# Patient Record
Sex: FEMALE | Race: BLACK OR AFRICAN AMERICAN | NOT HISPANIC OR LATINO | Employment: OTHER | URBAN - METROPOLITAN AREA
[De-identification: names, ages, dates, MRNs, and addresses within clinical notes are randomized per-mention and may not be internally consistent; named-entity substitution may affect disease eponyms.]

---

## 2018-08-14 ENCOUNTER — HOSPITAL ENCOUNTER (EMERGENCY)
Facility: HOSPITAL | Age: 63
Discharge: HOME | End: 2018-08-14
Attending: EMERGENCY MEDICINE
Payer: MEDICARE

## 2018-08-14 VITALS
RESPIRATION RATE: 16 BRPM | OXYGEN SATURATION: 96 % | HEIGHT: 61 IN | WEIGHT: 180 LBS | DIASTOLIC BLOOD PRESSURE: 78 MMHG | SYSTOLIC BLOOD PRESSURE: 164 MMHG | HEART RATE: 91 BPM | TEMPERATURE: 97 F | BODY MASS INDEX: 33.99 KG/M2
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
## 2018-08-14 PROCEDURE — 99283 EMERGENCY DEPT VISIT LOW MDM: CPT | Mod: 25

## 2018-08-14 PROCEDURE — 25000003 PHARM REV CODE 250: Performed by: EMERGENCY MEDICINE

## 2018-08-14 PROCEDURE — 96372 THER/PROPH/DIAG INJ SC/IM: CPT

## 2018-08-14 PROCEDURE — 63600175 PHARM REV CODE 636 W HCPCS: Performed by: EMERGENCY MEDICINE

## 2018-08-14 RX ORDER — BACLOFEN 10 MG/1
10 TABLET ORAL 3 TIMES DAILY
Status: ON HOLD | COMMUNITY
End: 2018-09-23

## 2018-08-14 RX ORDER — FUROSEMIDE 40 MG/1
40 TABLET ORAL DAILY
Status: ON HOLD | COMMUNITY
End: 2019-01-24

## 2018-08-14 RX ORDER — ALBUTEROL SULFATE 0.63 MG/3ML
0.83 SOLUTION RESPIRATORY (INHALATION) EVERY 6 HOURS PRN
Status: ON HOLD | COMMUNITY
End: 2018-09-23

## 2018-08-14 RX ORDER — SIMVASTATIN 40 MG/1
40 TABLET, FILM COATED ORAL NIGHTLY
Status: ON HOLD | COMMUNITY
End: 2018-10-02 | Stop reason: SDUPTHER

## 2018-08-14 RX ORDER — PRAZOSIN HYDROCHLORIDE 5 MG/1
5 CAPSULE ORAL 2 TIMES DAILY
Status: ON HOLD | COMMUNITY
End: 2018-10-02 | Stop reason: HOSPADM

## 2018-08-14 RX ORDER — LOSARTAN POTASSIUM 100 MG/1
100 TABLET ORAL DAILY
COMMUNITY

## 2018-08-14 RX ORDER — FLUTICASONE PROPIONATE AND SALMETEROL 500; 50 UG/1; UG/1
1 POWDER RESPIRATORY (INHALATION) 2 TIMES DAILY
Status: ON HOLD | COMMUNITY
End: 2018-09-23

## 2018-08-14 RX ORDER — PREDNISONE 10 MG/1
10 TABLET ORAL DAILY
Status: ON HOLD | COMMUNITY
End: 2018-12-15 | Stop reason: HOSPADM

## 2018-08-14 RX ORDER — DULOXETIN HYDROCHLORIDE 60 MG/1
60 CAPSULE, DELAYED RELEASE ORAL DAILY
COMMUNITY

## 2018-08-14 RX ORDER — LANSOPRAZOLE 30 MG/1
30 CAPSULE, DELAYED RELEASE ORAL DAILY
Status: ON HOLD | COMMUNITY
End: 2018-09-23

## 2018-08-14 RX ORDER — THEOPHYLLINE 200 MG/1
200 TABLET, EXTENDED RELEASE ORAL DAILY
Status: ON HOLD | COMMUNITY
End: 2018-10-02 | Stop reason: HOSPADM

## 2018-08-14 RX ORDER — OXYCODONE AND ACETAMINOPHEN 5; 325 MG/1; MG/1
2 TABLET ORAL
Status: COMPLETED | OUTPATIENT
Start: 2018-08-14 | End: 2018-08-14

## 2018-08-14 RX ORDER — KETOROLAC TROMETHAMINE 30 MG/ML
30 INJECTION, SOLUTION INTRAMUSCULAR; INTRAVENOUS
Status: COMPLETED | OUTPATIENT
Start: 2018-08-14 | End: 2018-08-14

## 2018-08-14 RX ORDER — DILTIAZEM HYDROCHLORIDE 180 MG/1
180 CAPSULE, COATED, EXTENDED RELEASE ORAL DAILY
Status: ON HOLD | COMMUNITY
End: 2018-10-02 | Stop reason: HOSPADM

## 2018-08-14 RX ORDER — GABAPENTIN 300 MG/1
300 CAPSULE ORAL 3 TIMES DAILY
Status: ON HOLD | COMMUNITY
End: 2018-09-23

## 2018-08-14 RX ORDER — CLOPIDOGREL BISULFATE 75 MG/1
75 TABLET ORAL DAILY
COMMUNITY

## 2018-08-14 RX ORDER — OXYCODONE AND ACETAMINOPHEN 5; 325 MG/1; MG/1
1 TABLET ORAL EVERY 4 HOURS PRN
Qty: 18 TABLET | Refills: 0 | Status: ON HOLD | OUTPATIENT
Start: 2018-08-14 | End: 2018-08-26

## 2018-08-14 RX ADMIN — KETOROLAC TROMETHAMINE 30 MG: 30 INJECTION, SOLUTION INTRAMUSCULAR at 06:08

## 2018-08-14 RX ADMIN — OXYCODONE HYDROCHLORIDE AND ACETAMINOPHEN 2 TABLET: 5; 325 TABLET ORAL at 06:08

## 2018-08-14 NOTE — ED NOTES
Got up to go to bathroom around 3 am and became dizzy en route to restroom.  Pt reports fell, landed on buttocks.  Denies LOC, denies striking head.  Recently started taking neurontin over the last 2 weeks and has been having increased dizziness at night since starting the medication.

## 2018-08-15 NOTE — ED PROVIDER NOTES
NAME:  Sheryl Martines  [facility]:     [id number]  MRN:    [id number]  ADMIT DATE: 8/14/2018        eMERGENCY dEPARTMENT eNCOUnter    CHIEF COMPLAINT    Chief Complaint   Patient presents with    Fall     pt became dizzy after getting up at 3am fell back hitting low back.        HPI      Sheryl Martines is a 63 y.o. female who presents to the ED for evaluation of back pain after a fall.  Patient states that she got dizzy while getting up to go to the bathroom last night and fell down the backwards.  The patient complains of lower back pain since that time.  She has a history of spinal stenosis and sciatica.  The patient is on prednisone daily.  She denies any weakness or numbness in her lower extremities.          ALLERGIES    Review of patient's allergies indicates:   Allergen Reactions    Ace inhibitors Swelling    Corticosteroids (glucocorticoids)     Hydralazine analogues     Tetracyclines Swelling    Travatan (with benzalkonium) [travoprost (benzalkonium)]        PAST MEDICAL HISTORY  Past Medical History:   Diagnosis Date    Asthma     Coronary artery disease     Diabetes mellitus     Glaucoma     High cholesterol     Hypertension     Iritis     Pulmonary embolus     Stroke     rt sided weakness.       SURGICAL HISTORY    Past Surgical History:   Procedure Laterality Date    ABDOMINAL SURGERY      BACK SURGERY      stimulator    CATARACT EXTRACTION      HYSTERECTOMY         SOCIAL HISTORY    Social History     Socioeconomic History    Marital status:      Spouse name: None    Number of children: None    Years of education: None    Highest education level: None   Social Needs    Financial resource strain: None    Food insecurity - worry: None    Food insecurity - inability: None    Transportation needs - medical: None    Transportation needs - non-medical: None   Occupational History    None   Tobacco Use    Smoking status: Never Smoker   Substance and Sexual Activity    Alcohol  "use: No     Frequency: Never    Drug use: No    Sexual activity: None   Other Topics Concern    None   Social History Narrative    None       FAMILY HISTORY    History reviewed. No pertinent family history.    REVIEW OF SYSTEMS   ROS  All Systems otherwise negative except as noted in the History of Present Illness.        PHYSICAL EXAM    Reviewed Triage Note  VITAL SIGNS:   ED Triage Vitals [08/14/18 1733]   Enc Vitals Group      BP (!) 140/67      Pulse 80      Resp 18      Temp 98.7 °F (37.1 °C)      Temp src Oral      SpO2 99 %      Weight 180 lb      Height 5' 1"      Head Circumference       Peak Flow       Pain Score       Pain Loc       Pain Edu?       Excl. in GC?        Patient Vitals for the past 24 hrs:   BP Temp Temp src Pulse Resp SpO2 Height Weight   08/14/18 1752 -- -- -- 80 -- -- -- --   08/14/18 1733 (!) 140/67 98.7 °F (37.1 °C) Oral 80 18 99 % 5' 1" (1.549 m) 81.6 kg (180 lb)           Physical Exam    Constitutional:  Well-developed, well-nourished. No acute distress  HENT:  Normocephalic, atraumatic.  Eyes:  EOMI. Conjunctiva normal without discharge.   Neck: Normal range of motion.No stridor. No meningismus.   Respiratory:  No respiratory distress, retractions, or conversational dyspnea.   Cardiovascular:  Normal heart rate. No pitting lower extremity edema.   Musculoskeletal: low back tenderness in the midline  Integument:  Warm and dry. No rash.  Neurologic:  Normal motor function. No focal deficits noted. Alert and Interactive.  Psychiatric:  Affect normal. Mood normal.         LABS  Pertinent labs reviewed. (See chart for details)   Labs Reviewed - No data to display      RADIOLOGY    Imaging Results          X-Ray Lumbar Spine Ap And Lateral (Final result)  Result time 08/14/18 18:58:33    Final result by Esperanza Wheeler MD (08/14/18 18:58:33)                 Impression:      Multiple age indeterminate compression fractures within the lumbar spine, moderate involving L1 and L2 and " mild involving L3 and L5.      Electronically signed by: Esperanza Wheeler MD  Date:    08/14/2018  Time:    18:58             Narrative:    EXAMINATION:  XR LUMBAR SPINE AP AND LATERAL    CLINICAL HISTORY:  Low back pain, minor trauma;Unspecified fall, initial encounter    TECHNIQUE:  AP, lateral and spot images were performed of the lumbar spine.    COMPARISON:  None    FINDINGS:  Multiple compression fractures are seen within the lumbar spine which are age indeterminate as no prior studies are available for comparison.  Moderate compression fractures are seen of the L1 and L2 vertebral bodies.  Mild compression fractures are also seen of the L3 and L5 vertebral bodies.  Degenerative changes and facet arthropathy are also seen.  Visualized sacrum is unremarkable.  Spinal stimulator device is noted as well as partially visualized left total hip arthroplasty.                                PROCEDURES    Procedures      EKG     Interpreted by ERP:         ED COURSE & MEDICAL DECISION MAKING    Pertinent & Imaging studies reviewed. (See chart for details and specific orders.)        Medications   ketorolac injection 30 mg (30 mg Intramuscular Given 8/14/18 1848)   oxyCODONE-acetaminophen 5-325 mg per tablet 2 tablet (2 tablets Oral Given 8/14/18 1850)          Pt has multiple compression fractures of indeterminate age. She reports some relief with pain control here.  Will dc w percocet and f/u w ortho       DISPOSITION  Patient discharged in stable condition at No discharge date for patient encounter.      DISCHARGE INSTRUCTIONS & MEDS     Conrad Sheryl   Home Medication Instructions LOTTIE:[id number]    Printed on:08/14/18 1909   Medication Information                      albuterol (ACCUNEB) 0.63 mg/3 mL Nebu  Take 0.63 mg by nebulization every 6 (six) hours as needed. Rescue             baclofen (LIORESAL) 10 MG tablet  Take 10 mg by mouth 3 (three) times daily.             clopidogrel (PLAVIX) 75 mg tablet  Take 75  mg by mouth once daily.             diltiaZEM (CARDIZEM CD) 180 MG 24 hr capsule  Take 180 mg by mouth once daily.             DULoxetine (CYMBALTA) 60 MG capsule  Take 60 mg by mouth once daily.             fluticasone-salmeterol 500-50 mcg/dose (ADVAIR DISKUS) 500-50 mcg/dose DsDv diskus inhaler  Inhale 1 puff into the lungs 2 (two) times daily. Controller             furosemide (LASIX) 40 MG tablet  Take 40 mg by mouth 2 (two) times daily.             gabapentin (NEURONTIN) 300 MG capsule  Take 300 mg by mouth 3 (three) times daily.             insulin detemir U-100 (LEVEMIR) 100 unit/mL injection  Inject into the skin every evening.             insulin lispro (HUMALOG PEN SUBQ)  Inject into the skin.             lansoprazole (PREVACID) 30 MG capsule  Take 30 mg by mouth once daily.             losartan potassium (COZAAR ORAL)  Take by mouth.             prazosin (MINIPRESS) 5 MG capsule  Take 5 mg by mouth every evening.             predniSONE (DELTASONE) 10 MG tablet  Take 10 mg by mouth once daily.             simvastatin (ZOCOR) 40 MG tablet  Take 40 mg by mouth every evening.             sitagliptin phosphate (JANUVIA ORAL)  Take by mouth.             theophylline (THEODUR) 200 MG 12 hr tablet  Take 200 mg by mouth 2 (two) times daily.             theophylline anhydrous (UNIPHYL ORAL)  Take by mouth.                   New Prescriptions    OXYCODONE-ACETAMINOPHEN (PERCOCET) 5-325 MG PER TABLET    Take 1 tablet by mouth every 4 (four) hours as needed for Pain.           FINAL IMPRESSION    1. Compression fracture of vertebra, initial encounter    2. Fall              Blood Pressure Follow-Up Advised  Patient advised to follow up with PCP within 3-5 days for blood pressure re-check if blood pressure is equal to or greater than 120/80.         Critical care time spent with this patient (not including separately billable items) was  0 minutes.     DISCLAIMER: This note was prepared with Dragon NaturallySpeaking  voice recognition transcription software. Garbled syntax, mangled pronouns, and other bizarre constructions may be attributed to that software system.      Aaron Jose MD  08/14/2018  7:09 PM          Aaron Jose MD  08/14/18 1913

## 2018-08-22 ENCOUNTER — HOSPITAL ENCOUNTER (INPATIENT)
Facility: HOSPITAL | Age: 63
LOS: 14 days | Discharge: HOME HEALTH | DRG: 853 | End: 2018-09-05
Attending: EMERGENCY MEDICINE | Admitting: FAMILY MEDICINE
Payer: MEDICARE

## 2018-08-22 DIAGNOSIS — E11.29 TYPE 2 DIABETES MELLITUS WITH MICROALBUMINURIA, WITH LONG-TERM CURRENT USE OF INSULIN: ICD-10-CM

## 2018-08-22 DIAGNOSIS — S32.040A CLOSED COMPRESSION FRACTURE OF FOURTH LUMBAR VERTEBRA, INITIAL ENCOUNTER: ICD-10-CM

## 2018-08-22 DIAGNOSIS — R47.81 SLURRED SPEECH: ICD-10-CM

## 2018-08-22 DIAGNOSIS — R50.9 FEVER, UNSPECIFIED FEVER CAUSE: ICD-10-CM

## 2018-08-22 DIAGNOSIS — K59.00 CONSTIPATION, UNSPECIFIED CONSTIPATION TYPE: ICD-10-CM

## 2018-08-22 DIAGNOSIS — R41.82 ALTERED MENTAL STATUS: ICD-10-CM

## 2018-08-22 DIAGNOSIS — R80.9 TYPE 2 DIABETES MELLITUS WITH MICROALBUMINURIA, WITH LONG-TERM CURRENT USE OF INSULIN: ICD-10-CM

## 2018-08-22 DIAGNOSIS — N17.9 AKI (ACUTE KIDNEY INJURY): ICD-10-CM

## 2018-08-22 DIAGNOSIS — I25.10 CORONARY ARTERY DISEASE, ANGINA PRESENCE UNSPECIFIED, UNSPECIFIED VESSEL OR LESION TYPE, UNSPECIFIED WHETHER NATIVE OR TRANSPLANTED HEART: ICD-10-CM

## 2018-08-22 DIAGNOSIS — A49.8 CLOSTRIDIUM DIFFICILE INFECTION: ICD-10-CM

## 2018-08-22 DIAGNOSIS — R09.02 HYPOXIA: ICD-10-CM

## 2018-08-22 DIAGNOSIS — R79.89 ELEVATED TROPONIN: ICD-10-CM

## 2018-08-22 DIAGNOSIS — F11.90 OPIATE USE: ICD-10-CM

## 2018-08-22 DIAGNOSIS — R41.82 ALTERED MENTAL STATUS, UNSPECIFIED ALTERED MENTAL STATUS TYPE: ICD-10-CM

## 2018-08-22 DIAGNOSIS — I63.9 CEREBROVASCULAR ACCIDENT (CVA), UNSPECIFIED MECHANISM: ICD-10-CM

## 2018-08-22 DIAGNOSIS — K35.33 APPENDICITIS WITH PERITONITIS: Primary | ICD-10-CM

## 2018-08-22 DIAGNOSIS — K35.30 ACUTE APPENDICITIS WITH LOCALIZED PERITONITIS: ICD-10-CM

## 2018-08-22 DIAGNOSIS — I10 HYPERTENSION, UNSPECIFIED TYPE: ICD-10-CM

## 2018-08-22 DIAGNOSIS — Z79.4 TYPE 2 DIABETES MELLITUS WITH MICROALBUMINURIA, WITH LONG-TERM CURRENT USE OF INSULIN: ICD-10-CM

## 2018-08-22 DIAGNOSIS — I69.359 CVA, OLD, HEMIPARESIS: ICD-10-CM

## 2018-08-22 LAB
ALBUMIN SERPL BCP-MCNC: 2.8 G/DL
ALP SERPL-CCNC: 93 U/L
ALT SERPL W/O P-5'-P-CCNC: 19 U/L
AMPHET+METHAMPHET UR QL: NEGATIVE
ANION GAP SERPL CALC-SCNC: 9 MMOL/L
APAP SERPL-MCNC: <3 UG/ML
AST SERPL-CCNC: 23 U/L
BARBITURATES UR QL SCN>200 NG/ML: NEGATIVE
BASOPHILS # BLD AUTO: 0.04 K/UL
BASOPHILS NFR BLD: 0.4 %
BENZODIAZ UR QL SCN>200 NG/ML: NEGATIVE
BILIRUB SERPL-MCNC: 0.4 MG/DL
BILIRUB UR QL STRIP: ABNORMAL
BUN SERPL-MCNC: 26 MG/DL
BZE UR QL SCN: NEGATIVE
CALCIUM SERPL-MCNC: 9.9 MG/DL
CANNABINOIDS UR QL SCN: NEGATIVE
CHLORIDE SERPL-SCNC: 98 MMOL/L
CLARITY UR: CLEAR
CO2 SERPL-SCNC: 34 MMOL/L
COLOR UR: YELLOW
CREAT SERPL-MCNC: 2.5 MG/DL
CREAT UR-MCNC: 345.4 MG/DL
DIFFERENTIAL METHOD: ABNORMAL
EOSINOPHIL # BLD AUTO: 0.2 K/UL
EOSINOPHIL NFR BLD: 1.7 %
ERYTHROCYTE [DISTWIDTH] IN BLOOD BY AUTOMATED COUNT: 16.1 %
EST. GFR  (AFRICAN AMERICAN): 23 ML/MIN/1.73 M^2
EST. GFR  (NON AFRICAN AMERICAN): 20 ML/MIN/1.73 M^2
ETHANOL SERPL-MCNC: <10 MG/DL
GLUCOSE SERPL-MCNC: 235 MG/DL
GLUCOSE UR QL STRIP: NEGATIVE
HCT VFR BLD AUTO: 32.4 %
HGB BLD-MCNC: 9.4 G/DL
HGB UR QL STRIP: NEGATIVE
KETONES UR QL STRIP: NEGATIVE
LEUKOCYTE ESTERASE UR QL STRIP: NEGATIVE
LYMPHOCYTES # BLD AUTO: 2.4 K/UL
LYMPHOCYTES NFR BLD: 23.2 %
MAGNESIUM SERPL-MCNC: 1.7 MG/DL
MCH RBC QN AUTO: 23.6 PG
MCHC RBC AUTO-ENTMCNC: 29 G/DL
MCV RBC AUTO: 81 FL
METHADONE UR QL SCN>300 NG/ML: NEGATIVE
MONOCYTES # BLD AUTO: 1 K/UL
MONOCYTES NFR BLD: 9.1 %
NEUTROPHILS # BLD AUTO: 6.9 K/UL
NEUTROPHILS NFR BLD: 65.4 %
NITRITE UR QL STRIP: NEGATIVE
OPIATES UR QL SCN: ABNORMAL
PCP UR QL SCN>25 NG/ML: NEGATIVE
PH UR STRIP: 6 [PH] (ref 5–8)
PHOSPHATE SERPL-MCNC: 4.2 MG/DL
PLATELET # BLD AUTO: 246 K/UL
PMV BLD AUTO: 9.4 FL
POCT GLUCOSE: 241 MG/DL (ref 70–110)
POTASSIUM SERPL-SCNC: 3.7 MMOL/L
PROT SERPL-MCNC: 6.9 G/DL
PROT UR QL STRIP: ABNORMAL
RBC # BLD AUTO: 3.99 M/UL
SALICYLATES SERPL-MCNC: <5 MG/DL
SODIUM SERPL-SCNC: 141 MMOL/L
SP GR UR STRIP: >=1.03 (ref 1–1.03)
TOXICOLOGY INFORMATION: ABNORMAL
TROPONIN I SERPL DL<=0.01 NG/ML-MCNC: 0.27 NG/ML
TSH SERPL DL<=0.005 MIU/L-ACNC: 0.9 UIU/ML
URN SPEC COLLECT METH UR: ABNORMAL
UROBILINOGEN UR STRIP-ACNC: 1 EU/DL
WBC # BLD AUTO: 10.48 K/UL

## 2018-08-22 PROCEDURE — 81003 URINALYSIS AUTO W/O SCOPE: CPT | Mod: 59

## 2018-08-22 PROCEDURE — 83735 ASSAY OF MAGNESIUM: CPT

## 2018-08-22 PROCEDURE — 80329 ANALGESICS NON-OPIOID 1 OR 2: CPT

## 2018-08-22 PROCEDURE — 84443 ASSAY THYROID STIM HORMONE: CPT

## 2018-08-22 PROCEDURE — 85025 COMPLETE CBC W/AUTO DIFF WBC: CPT

## 2018-08-22 PROCEDURE — 84100 ASSAY OF PHOSPHORUS: CPT

## 2018-08-22 PROCEDURE — 80320 DRUG SCREEN QUANTALCOHOLS: CPT

## 2018-08-22 PROCEDURE — 96361 HYDRATE IV INFUSION ADD-ON: CPT

## 2018-08-22 PROCEDURE — 51701 INSERT BLADDER CATHETER: CPT

## 2018-08-22 PROCEDURE — 93005 ELECTROCARDIOGRAM TRACING: CPT

## 2018-08-22 PROCEDURE — 80307 DRUG TEST PRSMV CHEM ANLYZR: CPT

## 2018-08-22 PROCEDURE — 96374 THER/PROPH/DIAG INJ IV PUSH: CPT

## 2018-08-22 PROCEDURE — 84484 ASSAY OF TROPONIN QUANT: CPT

## 2018-08-22 PROCEDURE — 82962 GLUCOSE BLOOD TEST: CPT

## 2018-08-22 PROCEDURE — 99285 EMERGENCY DEPT VISIT HI MDM: CPT | Mod: 25

## 2018-08-22 PROCEDURE — 12000002 HC ACUTE/MED SURGE SEMI-PRIVATE ROOM

## 2018-08-22 PROCEDURE — 80053 COMPREHEN METABOLIC PANEL: CPT

## 2018-08-22 NOTE — LETTER
August 31, 2018         [address] [place] [address] [place] [address]  Phone: [phone]  Fax: [phone]       Patient: Sheryl Martines   YOB: 1955  Date of Visit: 08/31/2018    To Whom It May Concern:    Abdirashid Martines has been hospitalized at [facility] since 8/23/18.  Her discharge date is still to be determined.   If you have any questions or concerns, or if I can be of further assistance, please do not hesitate to contact me.    Sincerely,              Natalya Smith, DO

## 2018-08-23 PROBLEM — R47.81 SLURRED SPEECH: Status: ACTIVE | Noted: 2018-08-23

## 2018-08-23 PROBLEM — I63.9 CVA (CEREBRAL VASCULAR ACCIDENT): Status: ACTIVE | Noted: 2018-08-23

## 2018-08-23 PROBLEM — R41.82 ALTERED MENTAL STATUS: Status: ACTIVE | Noted: 2018-08-23

## 2018-08-23 PROBLEM — I69.359 CVA, OLD, HEMIPARESIS: Status: ACTIVE | Noted: 2018-08-23

## 2018-08-23 PROBLEM — S32.040A CLOSED COMPRESSION FRACTURE OF FOURTH LUMBAR VERTEBRA: Status: ACTIVE | Noted: 2018-08-23

## 2018-08-23 PROBLEM — F11.90 OPIATE USE: Status: ACTIVE | Noted: 2018-08-23

## 2018-08-23 PROBLEM — E11.9 DM (DIABETES MELLITUS): Status: ACTIVE | Noted: 2018-08-23

## 2018-08-23 PROBLEM — I25.10 CAD (CORONARY ARTERY DISEASE): Status: ACTIVE | Noted: 2018-08-23

## 2018-08-23 PROBLEM — I10 HTN (HYPERTENSION): Status: ACTIVE | Noted: 2018-08-23

## 2018-08-23 LAB
ALBUMIN SERPL BCP-MCNC: 2.6 G/DL
ALP SERPL-CCNC: 97 U/L
ALT SERPL W/O P-5'-P-CCNC: 18 U/L
ANION GAP SERPL CALC-SCNC: 8 MMOL/L
APTT BLDCRRT: 29 SEC
AST SERPL-CCNC: 19 U/L
BILIRUB SERPL-MCNC: 0.4 MG/DL
BNP SERPL-MCNC: 25 PG/ML
BUN SERPL-MCNC: 25 MG/DL
CALCIUM SERPL-MCNC: 9.7 MG/DL
CHLORIDE SERPL-SCNC: 98 MMOL/L
CHOLEST SERPL-MCNC: 159 MG/DL
CHOLEST/HDLC SERPL: 4.5 {RATIO}
CO2 SERPL-SCNC: 34 MMOL/L
CREAT SERPL-MCNC: 1.8 MG/DL
CRP SERPL-MCNC: 222.39 MG/L
ERYTHROCYTE [SEDIMENTATION RATE] IN BLOOD BY WESTERGREN METHOD: 103 MM/HR
EST. GFR  (AFRICAN AMERICAN): 34 ML/MIN/1.73 M^2
EST. GFR  (NON AFRICAN AMERICAN): 30 ML/MIN/1.73 M^2
ESTIMATED AVG GLUCOSE: 186 MG/DL
ETHANOL SERPL-MCNC: <10 MG/DL
FERRITIN SERPL-MCNC: 78 NG/ML
FOLATE SERPL-MCNC: 11 NG/ML
GLUCOSE SERPL-MCNC: 146 MG/DL
HBA1C MFR BLD HPLC: 8.1 %
HDLC SERPL-MCNC: 35 MG/DL
HDLC SERPL: 22 %
INR PPP: 0.9
IRON SERPL-MCNC: 11 UG/DL
LACTATE SERPL-SCNC: 1.1 MMOL/L
LDLC SERPL CALC-MCNC: 86.6 MG/DL
MAGNESIUM SERPL-MCNC: 1.6 MG/DL
MAGNESIUM SERPL-MCNC: 2.1 MG/DL
NONHDLC SERPL-MCNC: 124 MG/DL
PHOSPHATE SERPL-MCNC: 3.5 MG/DL
PHOSPHATE SERPL-MCNC: 3.8 MG/DL
POCT GLUCOSE: 115 MG/DL (ref 70–110)
POCT GLUCOSE: 162 MG/DL (ref 70–110)
POCT GLUCOSE: 172 MG/DL (ref 70–110)
POCT GLUCOSE: 193 MG/DL (ref 70–110)
POCT GLUCOSE: 241 MG/DL (ref 70–110)
POTASSIUM SERPL-SCNC: 3.5 MMOL/L
PROT SERPL-MCNC: 6.6 G/DL
PROTHROMBIN TIME: 9.9 SEC
RETICS/RBC NFR AUTO: 1.4 %
RPR SER QL: NORMAL
SATURATED IRON: 4 %
SODIUM SERPL-SCNC: 140 MMOL/L
TOTAL IRON BINDING CAPACITY: 262 UG/DL
TRANSFERRIN SERPL-MCNC: 177 MG/DL
TRIGL SERPL-MCNC: 187 MG/DL
TROPONIN I SERPL DL<=0.01 NG/ML-MCNC: 0.23 NG/ML
TROPONIN I SERPL DL<=0.01 NG/ML-MCNC: 0.27 NG/ML
VIT B12 SERPL-MCNC: 1059 PG/ML

## 2018-08-23 PROCEDURE — 83605 ASSAY OF LACTIC ACID: CPT

## 2018-08-23 PROCEDURE — 99900039 HC SLP GENERIC THERAPY SCREENING (STAT)

## 2018-08-23 PROCEDURE — 86592 SYPHILIS TEST NON-TREP QUAL: CPT

## 2018-08-23 PROCEDURE — G8998 SWALLOW D/C STATUS: HCPCS | Mod: CJ

## 2018-08-23 PROCEDURE — 97802 MEDICAL NUTRITION INDIV IN: CPT

## 2018-08-23 PROCEDURE — 95816 EEG AWAKE AND DROWSY: CPT

## 2018-08-23 PROCEDURE — 95819 EEG AWAKE AND ASLEEP: CPT | Mod: 26,,, | Performed by: PSYCHIATRY & NEUROLOGY

## 2018-08-23 PROCEDURE — G8979 MOBILITY GOAL STATUS: HCPCS | Mod: CJ

## 2018-08-23 PROCEDURE — 84207 ASSAY OF VITAMIN B-6: CPT

## 2018-08-23 PROCEDURE — 85652 RBC SED RATE AUTOMATED: CPT

## 2018-08-23 PROCEDURE — 84484 ASSAY OF TROPONIN QUANT: CPT | Mod: 91

## 2018-08-23 PROCEDURE — 63600175 PHARM REV CODE 636 W HCPCS: Performed by: STUDENT IN AN ORGANIZED HEALTH CARE EDUCATION/TRAINING PROGRAM

## 2018-08-23 PROCEDURE — 84484 ASSAY OF TROPONIN QUANT: CPT

## 2018-08-23 PROCEDURE — 84100 ASSAY OF PHOSPHORUS: CPT | Mod: 91

## 2018-08-23 PROCEDURE — 87040 BLOOD CULTURE FOR BACTERIA: CPT | Mod: 59

## 2018-08-23 PROCEDURE — 83540 ASSAY OF IRON: CPT

## 2018-08-23 PROCEDURE — A4216 STERILE WATER/SALINE, 10 ML: HCPCS | Performed by: STUDENT IN AN ORGANIZED HEALTH CARE EDUCATION/TRAINING PROGRAM

## 2018-08-23 PROCEDURE — 97165 OT EVAL LOW COMPLEX 30 MIN: CPT

## 2018-08-23 PROCEDURE — 25000003 PHARM REV CODE 250: Performed by: STUDENT IN AN ORGANIZED HEALTH CARE EDUCATION/TRAINING PROGRAM

## 2018-08-23 PROCEDURE — 36415 COLL VENOUS BLD VENIPUNCTURE: CPT

## 2018-08-23 PROCEDURE — 25000003 PHARM REV CODE 250: Performed by: EMERGENCY MEDICINE

## 2018-08-23 PROCEDURE — 21400001 HC TELEMETRY ROOM

## 2018-08-23 PROCEDURE — 93005 ELECTROCARDIOGRAM TRACING: CPT

## 2018-08-23 PROCEDURE — 82728 ASSAY OF FERRITIN: CPT

## 2018-08-23 PROCEDURE — 83880 ASSAY OF NATRIURETIC PEPTIDE: CPT

## 2018-08-23 PROCEDURE — 80061 LIPID PANEL: CPT

## 2018-08-23 PROCEDURE — 83036 HEMOGLOBIN GLYCOSYLATED A1C: CPT

## 2018-08-23 PROCEDURE — G8996 SWALLOW CURRENT STATUS: HCPCS | Mod: CJ

## 2018-08-23 PROCEDURE — 82746 ASSAY OF FOLIC ACID SERUM: CPT

## 2018-08-23 PROCEDURE — G8997 SWALLOW GOAL STATUS: HCPCS | Mod: CJ

## 2018-08-23 PROCEDURE — 80053 COMPREHEN METABOLIC PANEL: CPT

## 2018-08-23 PROCEDURE — 97535 SELF CARE MNGMENT TRAINING: CPT

## 2018-08-23 PROCEDURE — 96374 THER/PROPH/DIAG INJ IV PUSH: CPT

## 2018-08-23 PROCEDURE — 82607 VITAMIN B-12: CPT

## 2018-08-23 PROCEDURE — 85045 AUTOMATED RETICULOCYTE COUNT: CPT

## 2018-08-23 PROCEDURE — 85730 THROMBOPLASTIN TIME PARTIAL: CPT

## 2018-08-23 PROCEDURE — 92610 EVALUATE SWALLOWING FUNCTION: CPT

## 2018-08-23 PROCEDURE — 84425 ASSAY OF VITAMIN B-1: CPT

## 2018-08-23 PROCEDURE — G8978 MOBILITY CURRENT STATUS: HCPCS | Mod: CM

## 2018-08-23 PROCEDURE — 83735 ASSAY OF MAGNESIUM: CPT

## 2018-08-23 PROCEDURE — 80320 DRUG SCREEN QUANTALCOHOLS: CPT

## 2018-08-23 PROCEDURE — 63600175 PHARM REV CODE 636 W HCPCS: Performed by: EMERGENCY MEDICINE

## 2018-08-23 PROCEDURE — 85610 PROTHROMBIN TIME: CPT

## 2018-08-23 PROCEDURE — 97530 THERAPEUTIC ACTIVITIES: CPT

## 2018-08-23 PROCEDURE — G8988 SELF CARE GOAL STATUS: HCPCS | Mod: CK

## 2018-08-23 PROCEDURE — 86141 C-REACTIVE PROTEIN HS: CPT

## 2018-08-23 PROCEDURE — 97161 PT EVAL LOW COMPLEX 20 MIN: CPT

## 2018-08-23 PROCEDURE — G8987 SELF CARE CURRENT STATUS: HCPCS | Mod: CL

## 2018-08-23 RX ORDER — SODIUM CHLORIDE 0.9 % (FLUSH) 0.9 %
3 SYRINGE (ML) INJECTION EVERY 8 HOURS
Status: DISCONTINUED | OUTPATIENT
Start: 2018-08-23 | End: 2018-09-05 | Stop reason: HOSPADM

## 2018-08-23 RX ORDER — CLOPIDOGREL BISULFATE 75 MG/1
75 TABLET ORAL DAILY
Status: DISCONTINUED | OUTPATIENT
Start: 2018-08-23 | End: 2018-09-05 | Stop reason: HOSPADM

## 2018-08-23 RX ORDER — DILTIAZEM HYDROCHLORIDE 180 MG/1
180 CAPSULE, COATED, EXTENDED RELEASE ORAL DAILY
Status: DISCONTINUED | OUTPATIENT
Start: 2018-08-23 | End: 2018-09-05 | Stop reason: HOSPADM

## 2018-08-23 RX ORDER — SODIUM CHLORIDE AND POTASSIUM CHLORIDE 150; 900 MG/100ML; MG/100ML
INJECTION, SOLUTION INTRAVENOUS CONTINUOUS
Status: DISCONTINUED | OUTPATIENT
Start: 2018-08-23 | End: 2018-08-23

## 2018-08-23 RX ORDER — LIDOCAINE AND PRILOCAINE 25; 25 MG/G; MG/G
CREAM TOPICAL ONCE
Status: COMPLETED | OUTPATIENT
Start: 2018-08-23 | End: 2018-08-23

## 2018-08-23 RX ORDER — LIDOCAINE 50 MG/G
1 PATCH TOPICAL
Status: DISCONTINUED | OUTPATIENT
Start: 2018-08-23 | End: 2018-09-05 | Stop reason: HOSPADM

## 2018-08-23 RX ORDER — GLUCAGON 1 MG
1 KIT INJECTION
Status: DISCONTINUED | OUTPATIENT
Start: 2018-08-23 | End: 2018-09-05 | Stop reason: HOSPADM

## 2018-08-23 RX ORDER — ACETAMINOPHEN 325 MG/1
650 TABLET ORAL EVERY 6 HOURS PRN
Status: DISCONTINUED | OUTPATIENT
Start: 2018-08-23 | End: 2018-09-05 | Stop reason: HOSPADM

## 2018-08-23 RX ORDER — DICLOFENAC SODIUM 10 MG/G
GEL TOPICAL DAILY
Status: DISCONTINUED | OUTPATIENT
Start: 2018-08-23 | End: 2018-09-05 | Stop reason: HOSPADM

## 2018-08-23 RX ORDER — ASPIRIN 81 MG/1
81 TABLET ORAL DAILY
Status: DISCONTINUED | OUTPATIENT
Start: 2018-08-23 | End: 2018-09-05 | Stop reason: HOSPADM

## 2018-08-23 RX ORDER — ONDANSETRON 8 MG/1
8 TABLET, ORALLY DISINTEGRATING ORAL EVERY 6 HOURS PRN
Status: DISCONTINUED | OUTPATIENT
Start: 2018-08-23 | End: 2018-09-05 | Stop reason: HOSPADM

## 2018-08-23 RX ORDER — ASPIRIN 325 MG
325 TABLET ORAL
Status: COMPLETED | OUTPATIENT
Start: 2018-08-23 | End: 2018-08-23

## 2018-08-23 RX ORDER — INSULIN ASPART 100 [IU]/ML
1-10 INJECTION, SOLUTION INTRAVENOUS; SUBCUTANEOUS EVERY 6 HOURS PRN
Status: DISCONTINUED | OUTPATIENT
Start: 2018-08-23 | End: 2018-08-27

## 2018-08-23 RX ORDER — ATORVASTATIN CALCIUM 40 MG/1
40 TABLET, FILM COATED ORAL DAILY
Status: DISCONTINUED | OUTPATIENT
Start: 2018-08-23 | End: 2018-09-05 | Stop reason: HOSPADM

## 2018-08-23 RX ORDER — ALBUTEROL SULFATE 90 UG/1
2 AEROSOL, METERED RESPIRATORY (INHALATION) EVERY 6 HOURS PRN
Status: DISCONTINUED | OUTPATIENT
Start: 2018-08-23 | End: 2018-09-05 | Stop reason: HOSPADM

## 2018-08-23 RX ORDER — LABETALOL HYDROCHLORIDE 5 MG/ML
10 INJECTION, SOLUTION INTRAVENOUS EVERY 4 HOURS PRN
Status: DISCONTINUED | OUTPATIENT
Start: 2018-08-23 | End: 2018-08-29

## 2018-08-23 RX ORDER — ACETAMINOPHEN 325 MG/1
650 TABLET ORAL EVERY 6 HOURS PRN
Status: DISCONTINUED | OUTPATIENT
Start: 2018-08-23 | End: 2018-08-23

## 2018-08-23 RX ORDER — FUROSEMIDE 40 MG/1
40 TABLET ORAL 2 TIMES DAILY
Status: DISCONTINUED | OUTPATIENT
Start: 2018-08-23 | End: 2018-09-05 | Stop reason: HOSPADM

## 2018-08-23 RX ADMIN — ASPIRIN 325 MG ORAL TABLET 325 MG: 325 PILL ORAL at 01:08

## 2018-08-23 RX ADMIN — CEFTRIAXONE 1 G: 1 INJECTION, SOLUTION INTRAVENOUS at 09:08

## 2018-08-23 RX ADMIN — AZITHROMYCIN MONOHYDRATE 500 MG: 500 INJECTION, POWDER, LYOPHILIZED, FOR SOLUTION INTRAVENOUS at 10:08

## 2018-08-23 RX ADMIN — SODIUM CHLORIDE AND POTASSIUM CHLORIDE: 9; 1.49 INJECTION, SOLUTION INTRAVENOUS at 09:08

## 2018-08-23 RX ADMIN — DILTIAZEM HYDROCHLORIDE 180 MG: 180 CAPSULE, EXTENDED RELEASE ORAL at 09:08

## 2018-08-23 RX ADMIN — INSULIN ASPART 1 UNITS: 100 INJECTION, SOLUTION INTRAVENOUS; SUBCUTANEOUS at 10:08

## 2018-08-23 RX ADMIN — ATORVASTATIN CALCIUM 40 MG: 40 TABLET, FILM COATED ORAL at 01:08

## 2018-08-23 RX ADMIN — DICLOFENAC: 10 GEL TOPICAL at 09:08

## 2018-08-23 RX ADMIN — CLOPIDOGREL BISULFATE 75 MG: 75 TABLET ORAL at 09:08

## 2018-08-23 RX ADMIN — INSULIN HUMAN 4 UNITS: 100 INJECTION, SOLUTION PARENTERAL at 01:08

## 2018-08-23 RX ADMIN — Medication 3 ML: at 02:08

## 2018-08-23 RX ADMIN — ASPIRIN 81 MG: 81 TABLET, COATED ORAL at 09:08

## 2018-08-23 RX ADMIN — LIDOCAINE 1 PATCH: 50 PATCH CUTANEOUS at 02:08

## 2018-08-23 RX ADMIN — LIDOCAINE AND PRILOCAINE: 25; 25 CREAM TOPICAL at 09:08

## 2018-08-23 RX ADMIN — FUROSEMIDE 40 MG: 40 TABLET ORAL at 09:08

## 2018-08-23 RX ADMIN — SODIUM CHLORIDE AND POTASSIUM CHLORIDE: 9; 1.49 INJECTION, SOLUTION INTRAVENOUS at 02:08

## 2018-08-23 NOTE — PLAN OF CARE
Problem: SLP Goal  Goal: SLP Goal  Short Term Goals:  1. Pt will participate in BSS to determine least restrictive diet.-MET 8/23  2. Pt will participate in informal speech screen to determine if further evaluation is required to r/o cognitive-linguistic deficits.- MET 8/23    Outcome: Outcome(s) achieved Date Met: 08/23/18 8/23:  Pt participated in clinical swallow eval this AM. Pt tolerated thin liquids, puree, mechanical soft, and hard solid textures with no overt s/s of aspiration, at bedside. Pt demonstrated prolonged mastication and delayed AP transfer for hard solid textures. Pt also participated in speech screening and demonstrated baseline cognitive-linguistic skills. SLP recs: Mechanical soft/thin liquids po diet with universal swallow precautions. ST services no longer required as pt has met all ST goals. SLP notified nurse Fish and MD team of results/recs. Please re-consult should pt experience any change in status.  NAVID Lubin., CCC-SLP  Speech-Language Pathologist

## 2018-08-23 NOTE — PLAN OF CARE
Problem: Patient Care Overview (Adult)  Goal: Plan of Care Review  Outcome: Ongoing (interventions implemented as appropriate)  Recommendation/Intervention:   1. Await ST diet recs.   2. Add ADA restrictions when diet ordered    Goals:   Diet will be started within 24 hours  Nutrition Goal Status: new

## 2018-08-23 NOTE — H&P
History & Physical  Family Medicine    Subjective:     Chief Complaint   Patient presents with    Altered Mental Status     To ER with EMS stating that the family called for altered mental status but was awake and alert when they arrived.  pt with slurred speech and has slept alot today, taking pain medication for an injury one week ago.       History of Present Illness:      63 y.o. 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 Most of the information was provided by the daughter who lives with the patient. The patient lives with the daughter and at 3 pm yesterday the daughter left and when she returned around 8 pm she noticed that her mother was altered and had slurred speech which was new. The daughter was worried that her mother took a percocet that she was recently prescribed for her pain or a new stroke was causing her AMS and slurred speech. The patient did not notice when her slurred speech started. The daughter also noticed that she was having trouble finding words. The daughter did not believe she fell. The patient was recently admitted on 8/14/18 for a fall and was diagnosed with compression fractures. She did not hit her head at that time. She went to a hospital in [place] and recently was given percocet's to take at home. The patient was oriented to person, place, but not time. She had trouble speaking during questioning. CT of the head showed presumed old lacunar infarcts with no acute lesions. The daughter reports she can not get an MRI due to having a back implant. The daughter notes that the patient did not complain of new focal weakness, numbness, tongue biting, or urinary incontinence. While in the ED the patient started to shake with all limbs mildly but she could converse during these shaking events and answer some questions. She denied chest pain, SOB, fevers, nausea, vomiting and headache.       Past  Medical History:   Diagnosis Date    Asthma     Coronary artery disease     Diabetes mellitus     Glaucoma     High cholesterol     Hypertension     Iritis     Pulmonary embolus     Stroke     rt sided weakness.       Past Surgical History:   Procedure Laterality Date    ABDOMINAL SURGERY      BACK SURGERY      stimulator    CATARACT EXTRACTION      HYSTERECTOMY         History reviewed. No pertinent family history.    Social History     Socioeconomic History    Marital status:      Spouse name: None    Number of children: None    Years of education: None    Highest education level: None   Social Needs    Financial resource strain: None    Food insecurity - worry: None    Food insecurity - inability: None    Transportation needs - medical: None    Transportation needs - non-medical: None   Occupational History    None   Tobacco Use    Smoking status: Never Smoker   Substance and Sexual Activity    Alcohol use: No     Frequency: Never    Drug use: No    Sexual activity: None   Other Topics Concern    None   Social History Narrative    None       Current Facility-Administered Medications   Medication Dose Route Frequency Provider Last Rate Last Dose    0.9 % NaCl with KCl 20 mEq infusion   Intravenous Continuous Vish Wilkes MD        albuterol inhaler 2 puff  2 puff Inhalation Q6H PRN Vish Wilkes MD        aspirin EC tablet 81 mg  81 mg Oral Daily Vish Wilkes MD        aspirin tablet 325 mg  325 mg Oral ED 1 Time Denise Blackmon MD        atorvastatin tablet 40 mg  40 mg Oral Daily Vish Wilkes MD        clopidogrel tablet 75 mg  75 mg Oral Daily Vish Wilkes MD        dextrose 50% injection 12.5 g  12.5 g Intravenous PRN Vish Wilkes MD        diltiaZEM 24 hr capsule 180 mg  180 mg Oral Daily Vish Wilkes MD        furosemide tablet 40 mg  40 mg Oral BID Vish Wilkes MD        glucagon (human recombinant)  injection 1 mg  1 mg Intramuscular PRN Vish Wilkes MD        insulin aspart U-100 pen 1-10 Units  1-10 Units Subcutaneous Q6H PRN Vish Wilkes MD        labetalol injection 10 mg  10 mg Intravenous Q4H PRN Vish Wilkes MD        ondansetron disintegrating tablet 8 mg  8 mg Oral Q6H PRN Vish Wilkes MD        sodium chloride 0.9% flush 3 mL  3 mL Intravenous Q8H Vish Wilkes MD         Current Outpatient Medications   Medication Sig Dispense Refill    albuterol (ACCUNEB) 0.63 mg/3 mL Nebu Take 0.63 mg by nebulization every 6 (six) hours as needed. Rescue      baclofen (LIORESAL) 10 MG tablet Take 10 mg by mouth 3 (three) times daily.      clopidogrel (PLAVIX) 75 mg tablet Take 75 mg by mouth once daily.      diltiaZEM (CARDIZEM CD) 180 MG 24 hr capsule Take 180 mg by mouth once daily.      DULoxetine (CYMBALTA) 60 MG capsule Take 60 mg by mouth once daily.      fluticasone-salmeterol 500-50 mcg/dose (ADVAIR DISKUS) 500-50 mcg/dose DsDv diskus inhaler Inhale 1 puff into the lungs 2 (two) times daily. Controller      furosemide (LASIX) 40 MG tablet Take 40 mg by mouth 2 (two) times daily.      gabapentin (NEURONTIN) 300 MG capsule Take 300 mg by mouth 3 (three) times daily.      insulin detemir U-100 (LEVEMIR) 100 unit/mL injection Inject into the skin every evening.      insulin lispro (HUMALOG PEN SUBQ) Inject into the skin.      lansoprazole (PREVACID) 30 MG capsule Take 30 mg by mouth once daily.      losartan potassium (COZAAR ORAL) Take by mouth.      oxyCODONE-acetaminophen (PERCOCET) 5-325 mg per tablet Take 1 tablet by mouth every 4 (four) hours as needed for Pain. 18 tablet 0    prazosin (MINIPRESS) 5 MG capsule Take 5 mg by mouth every evening.      predniSONE (DELTASONE) 10 MG tablet Take 10 mg by mouth once daily.      simvastatin (ZOCOR) 40 MG tablet Take 40 mg by mouth every evening.      sitagliptin phosphate (JANUVIA ORAL) Take by mouth.       theophylline (THEODUR) 200 MG 12 hr tablet Take 200 mg by mouth 2 (two) times daily.      theophylline anhydrous (UNIPHYL ORAL) Take by mouth.         Review of patient's allergies indicates:   Allergen Reactions    Ace inhibitors Swelling    Corticosteroids (glucocorticoids)     Hydralazine analogues     Tetracyclines Swelling    Travatan (with benzalkonium) [travoprost (benzalkonium)]        Review of Systems   Constitutional: Negative for chills, diaphoresis, fever, malaise/fatigue and weight loss.   HENT: Negative for congestion, nosebleeds and sore throat.    Eyes: Negative for blurred vision, double vision and photophobia.   Respiratory: Negative for cough, hemoptysis, sputum production, shortness of breath and wheezing.    Cardiovascular: Negative for chest pain, palpitations, orthopnea, leg swelling and PND.   Gastrointestinal: Negative for abdominal pain, blood in stool, constipation, diarrhea, melena, nausea and vomiting.   Genitourinary: Negative for dysuria, flank pain and frequency.   Musculoskeletal: Positive for back pain and joint pain. Negative for myalgias and neck pain.   Skin: Negative for rash.   Neurological: Positive for tremors, speech change, focal weakness and weakness. Negative for dizziness, sensory change (), seizures, loss of consciousness and headaches.   Endo/Heme/Allergies: Negative for polydipsia.   Psychiatric/Behavioral: Negative.      Objective     Vital Signs (Most Recent):  Temp:  [98.5 °F (36.9 °C)]   Pulse:  [64-83]   Resp:  [14-20]   BP: (124-148)/(45-88)   SpO2:  [94 %-100 %]     Physical Exam:  Constitutional: Patient is oriented to person, place, and not to time. Patient is lethargic and has left arm shaking.   HENT: Head: Normocephalic   Eyes: Dry skin on eyelids.  EOM are normal. Pupils are equal, round, and reactive to light. Neck: Normal range of motion. Neck supple. No JVD present. No tracheal deviation present.   Cardiovascular: Normal rate, regular  rhythm, normal heart sounds and intact distal pulses.  Exam reveals no gallop and no friction rub.  No murmur heard.  Pulmonary/Chest: Effort normal and breath sounds normal. No respiratory distress.   Abdominal: Soft. Bowel sounds are normal. Patient exhibits no distension and no mass. There is no tenderness.   Musculoskeletal: Normal range of motion. Patient exhibits no edema.  Skin: Skin is warm and dry. No rash noted. No erythema. No pallor.   Psychiatric: unable to ascess     Mentation: AAOx2  Cranial nerves: PERRLA, EOMI  Squeezed finger with both hands.  Patient could not follow with her eyes my hand.   Patient would not cooperate for full neuro exam.   Laboratory:  Recent Labs   Lab  08/22/18 2148   WBC  10.48   HGB  9.4*   HCT  32.4*   PLT  246     Recent Labs   Lab  08/22/18 2148   NA  141   K  3.7   CL  98   CO2  34*   BUN  26*   CREATININE  2.5*   CALCIUM  9.9   PROT  6.9   BILITOT  0.4   ALKPHOS  93   ALT  19   AST  23     Recent Labs   Lab  08/22/18 2146 08/23/18   0050   POCTGLUCOSE  241*  241*     Recent Labs   Lab  08/22/18 2148   TROPONINI  0.273*     Lab Results   Component Value Date    TSH 0.902 08/22/2018       Imaging  X-Ray Chest 1 View   Final Result      1. No acute radiographic findings in the chest on this single view.         Electronically signed by: Darwin Cool MD   Date:    08/22/2018   Time:    22:21      CT Head Without Contrast   Final Result      1. Apparent punctate foci of parenchymal hypoattenuation within the bilateral thalami concerning for age indeterminate lacunar infarcts, presumably old.  Clinical correlation is recommended.         Electronically signed by: Darwin Cool MD   Date:    08/22/2018   Time:    22:19      CTA Head    (Results Pending)   CTA Neck    (Results Pending)   US Carotid Bilateral    (Results Pending)       Assessment/Plan       63 y.o. 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     NEURO-PSYCH:    CT head is negative for any acute hemorrhagic event  Anti-platelet therapy: ASA, continue home plavix  Atorvastatin for secondary stroke prevention   Labs: HgA1C, Lipid Panel, TSH, Vitamin B-12, B6, Thiamine, Folate, RPR  Neurology consulted  admit to tele  Q4 nuero checks  Unable to get MRI and MRA due to back implant   Carotid Doppler ordered  2D echo with bubble study  PT/OT/ST evaluation  NPO pending speech evaluation   Elevate the head of the bed with aspiration precautions  Fall precautions  hold home blood pressure medications to allow for permissive hypertension    IV labetolol PRN for SBP >220 or DBP >120Will provide stroke education prior to discharge  EEG ordered  Drug screen pos for opiates     CV    placed on cardiac telemetry  EKG showed no ST elevation   Elevated troponin  Trend trop and EKG    Recent Labs   Lab  08/22/18 2148   TROPONINI  0.273*    No results found for: EF    HTN  Holding home bp meds  Permissive HTN    PULM:  Stable on room air   No acute issues at this time  CXR unremarkable     RENAL:     Pre-renal azotemia  Likely secondary to IVVD  IVFs   Strict I&Os  Avoid renal toxic meds  Lab Results   Component Value Date    CREATININE 2.5 (H) 08/22/2018         FEN-GI:    NPO  IV fluids  Keep Mg 2, K 4    ENDO:    DM  Holding home medications   Glucose goal 140-180  SSI  Monitor   Recent Labs   Lab  08/22/18 2146  08/23/18   0050   POCTGLUCOSE  241*  241*    No results found for: HGBA1C No results found for: LIPASE   Lab Results   Component Value Date    ALT 19 08/22/2018    AST 23 08/22/2018    ALKPHOS 93 08/22/2018    BILITOT 0.4 08/22/2018     Lab Results   Component Value Date    TSH 0.902 08/22/2018       HEME:     Anemia  Iron studies ordered  Stable   Recent Labs   Lab  08/22/18 2148   HGB  9.4*   HCT  32.4*   PLT  246      MSK  Gabapentin held   Hold all opiates due to AMS  Lidocaine patch ordered.     ID  No infection  suspected at this time     Temp Readings from Last 3 Encounters:   08/22/18 98.5 °F (36.9 °C) (Oral)   08/14/18 97.1 °F (36.2 °C) (Oral)     Lab Results   Component Value Date    WBC 10.48 08/22/2018      CXR: unremarkable   Urinalysis:   Lab Results   Component Value Date    COLORU Yellow 08/22/2018    SPECGRAV >=1.030 (A) 08/22/2018    NITRITE Negative 08/22/2018    KETONESU Negative 08/22/2018    UROBILINOGEN 1.0 08/22/2018     RPR    PPx: SCD  Diet: NPO      Vish Wilkes MD  08/23/2018

## 2018-08-23 NOTE — ASSESSMENT & PLAN NOTE
Contributing Nutrition Diagnosis  Inadequate energy intake    Related to (etiology):   dx    Signs and Symptoms (as evidenced by):   NPO     Interventions/Recommendations (treatment strategy):  See recs    Nutrition Diagnosis Status:   Improving

## 2018-08-23 NOTE — ED NOTES
Pt to ED via EMS with daughter and grandson for AMS. Daughter states she thinks pt took too much pain medication. Daughter states pt has not been acting normal since around 8:30 pm. Daughter is concerned because she is not sure if AMS is d/t too much pain medication or a stroke. Pt is awake, alert, oriented.

## 2018-08-23 NOTE — PT/OT/SLP EVAL
Speech Language Pathology Evaluation  Bedside Swallow, Informal Speech Screen and Discontinue ST services    Patient Name:  Sheryl Martines   MRN:  [id number]  Admitting Diagnosis: Altered mental status    Recommendations:                 General Recommendations:  Follow-up not indicated  Diet recommendations:  Mechanical soft, Thin   Aspiration Precautions: Standard aspiration precautions   General Precautions: Standard, fall  Communication strategies:  none    History:     Past Medical History:   Diagnosis Date    Asthma     Coronary artery disease     Diabetes mellitus     Glaucoma     High cholesterol     Hypertension     Iritis     Pulmonary embolus     Stroke     rt sided weakness.       Past Surgical History:   Procedure Laterality Date    ABDOMINAL SURGERY      BACK SURGERY      stimulator    CATARACT EXTRACTION      HYSTERECTOMY       History of Present Illness:  63 y.o. 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 Most of the information was provided by the daughter who lives with the patient. The patient lives with the daughter and at 3 pm yesterday the daughter left and when she returned around 8 pm she noticed that her mother was altered and had slurred speech which was new. The daughter was worried that her mother took a percocet that she was recently prescribed for her pain or a new stroke was causing her AMS and slurred speech. The patient did not notice when her slurred speech started. The daughter also noticed that she was having trouble finding words. The daughter did not believe she fell. The patient was recently admitted on 8/14/18 for a fall and was diagnosed with compression fractures. She did not hit her head at that time. She went to a hospital in [place] and recently was given percocet's to take at home. The patient was oriented to person, place, but not time. She had trouble speaking  "during questioning. CT of the head showed presumed old lacunar infarcts with no acute lesions. The daughter reports she can not get an MRI due to having a back implant. The daughter notes that the patient did not complain of new focal weakness, numbness, tongue biting, or urinary incontinence. While in the ED the patient started to shake with all limbs mildly but she could converse during these shaking events and answer some questions. She denied chest pain, SOB, fevers, nausea, vomiting and headache.        Social History: Patient lives with daughter at home.    Prior Intubation HX:  None this admission    Modified Barium Swallow: None on file    Chest X-Rays: No acute radiographic findings in the chest on this single view.    Prior diet: Per pt, regular/thin liquids PO diet.    Subjective     Pt participated in clinical swallow eval and informal speech screen this PM. SLP confirmed with RN prior to entry. Pt was awake and alert. Pt agreed to participated in skilled ST session. Pt oriented x4.    Patient goals: "I want some water" per pt     Pain/Comfort:  · Pain Rating 1: 10/10  · Location - Orientation 1: lower  · Location 1: back  · Pain Addressed 1: Nurse notified    Objective:     Oral Musculature Evaluation  · Oral Musculature: WFL  · Dentition: scattered dentition  · Mucosal Quality: adequate  · Mandibular Strength and Mobility: (reduced strength)  · Oral Labial Strength and Mobility: WFL  · Lingual Strength and Mobility: (reduced strength)  · Buccal Strength and Mobility: WFL  · Volitional Swallow: (timely upon palpation)    Bedside Swallow Eval:   Consistencies Assessed:  · Thin liquids -via cup sips x5, straw x7  · Puree -(applesauce) x4  · Soft solids -(softened shaka cracker) x3  · Solids -(shaka cracker) x2     Oral Phase:   · WFL   · Pt demonstrated slightly prolonged mastication and delayed AP transfer for hard solid textures --which SLP suspects 2/2 scattered dentition     Pharyngeal Phase:   · no " overt clinical signs/symptoms of aspiration  · no overt clinical signs/symptoms of pharyngeal dysphagia    Compensatory Strategies  · None    Treatment: Pt participated in clinical swallow eval this AM. Pt tolerated thin liquids, puree, mechanical soft, and hard solid textures with no overt s/s of aspiration, at bedside. Pt demonstrated prolonged mastication and delayed AP transfer for hard solid textures. Pt also participated in speech screening and demonstrated baseline cognitive-linguistic skills.   SLP educated pt on role of SLP, clinical swallow eval, diet recs/swallow precautions, informal speech screen/results and POC. Pt acknowledged and confirmed understanding.       Assessment:     Sheryl Martines is a 63 y.o. female admitted with Altered mental status.  She presents with oral and pharyngeal phases of the swallow judged to be WFL. Pt also demonstrates baseline cognitive-linguistic skills. SLP recs: Mechanical soft/thin liquids po diet with universal swallow precautions. ST services no longer required as pt has met all ST goals. SLP notified nurse Fish and MD team of results/recs. Please re-consult should pt experience any change in status.      Goals:   Multidisciplinary Problems     SLP Goals     Not on file          Multidisciplinary Problems (Resolved)        Problem: SLP Goal    Goal Priority Disciplines Outcome   SLP Goal   (Resolved)     SLP Outcome(s) achieved   Description:  Short Term Goals:  1. Pt will participate in BSS to determine least restrictive diet.-MET 8/23  2. Pt will participate in informal speech screen to determine if further evaluation is required to r/o cognitive-linguistic deficits.- MET 8/23                      Plan:     · Plan of Care reviewed with:  patient(Nurse Fish and MD team)   · SLP Follow-Up:  No       Discharge recommendations:  (TBD, however no further ST needs)     Time Tracking:     SLP Treatment Date:   08/23/18  Speech Start Time:  1424  Speech Stop Time:   1440     Speech Total Time (min):  16 min    Billable Minutes: Eval Swallow and Oral Function 16    ALEXANDER Lubin, CCC-SLP  08/23/2018

## 2018-08-23 NOTE — PLAN OF CARE
Problem: Occupational Therapy Goal  Goal: Occupational Therapy Goal  Goals to be met by: 8/31/2018    Patient will increase functional independence with ADLs by performing:    Feeding with Modified [place].  UE Dressing with Set-up Assistance.  LE Dressing with Minimal Assistance.  Grooming while seated with Set-up Assistance.  Toileting from bedside commode with Minimal Assistance for hygiene and clothing management.   Sitting at edge of bed x20 minutes with Supervision.  Rolling to Bilateral with Modified [place].   Supine to sit with Modified [place].  Stand pivot transfers with Stand-by Assistance.  Step transfer with Supervision and Stand-by Assistance  Toilet transfer to bedside commode with Stand-by Assistance.  Increased functional strength to WFL for BUE.  Upper extremity exercise program 10 reps per handout, with supervision.    Outcome: Ongoing (interventions implemented as appropriate)  OT initial eval completed and treatment initiated.  Pt. would benfit from ongoing OT.  Discharge recommendations pending progress.  Continue with OT POC.

## 2018-08-23 NOTE — PT/OT/SLP EVAL
"Occupational Therapy   Evaluation    Name: Sheryl Martines  MRN: [id number]  Admitting Diagnosis:  Altered mental status      Recommendations:     Discharge Recommendations: (TBD)  Discharge Equipment Recommendations:  bath bench, wheelchair  Barriers to discharge:  Decreased caregiver support, Inaccessible home environment    History:     Occupational Profile:  Living Environment: Pt lives with dtr in [facility] with one big step up into the SSH; tub/shower combo-pt unable to get into the tub-pt's tub chair does not fit in the tub  Previous level of function: Pt. Reportedly indep-Andrei ADLs and sponge bathes; ambulated Andrei with rollator; takes care of 3 y/o grandson while daughter is at work. 3n1 commode over toilet.  Daughter provides transportation.  Roles and Routines: cares for 3 y/o grandson.  Equipment Used at Home:  rollator, 3-in-1 commode; has access to shower chair but states its "too big" for tub  Assistance upon Discharge: limited by daughter 2/2 works during the day.    Past Medical History:   Diagnosis Date    Asthma     Coronary artery disease     Diabetes mellitus     Glaucoma     High cholesterol     Hypertension     Iritis     Pulmonary embolus     Stroke     rt sided weakness.       Past Surgical History:   Procedure Laterality Date    ABDOMINAL SURGERY      BACK SURGERY      stimulator    CATARACT EXTRACTION      HYSTERECTOMY         Subjective     Chief Complaint: I have to use the bed pan.  Patient/Family Comments/goals: none    Pain/Comfort:  · Pain Rating 1: 10/10  · Location - Side 1: Bilateral  · Location - Orientation 1: lower  · Location 1: back  · Pain Addressed 1: Reposition, Nurse notified, Distraction, Cessation of Activity(pain patch to back)  · Pain Rating 2: 10/10    Patients cultural, spiritual, Buddhism conflicts given the current situation: na    Objective:     Communicated with: nurse prior to session.  Patient found all lines intact, call button in reach and nurse " notified and telemetry upon OT entry to room.    General Precautions: Standard, fall   Orthopedic Precautions:spinal precautions(compression fracture L1-L5)   Braces: N/A     Occupational Performance:    Bed Mobility:    · Patient completed Rolling/Turning to Left with  contact guard assistance and with side rail  · Patient completed Rolling/Turning to Right with contact guard assistance and with side rail  · Patient completed Scooting/Bridging with total assistance and 2 toward HOB persons  · Patient completed Supine to Sit with NT 2/2 back pain -only allowed lidocaine patches per nurse   ·     Functional Mobility/Transfers:  · NT 2/2 back pain  · Functional Mobility: same    Activities of Daily Living:  · Feeding:  setup .  · Grooming: NT .  · Lower Body Dressing: total assistance socks   · Toileting:  Maximum assistance  Bed level -rolled with CGA using bed rail for bed pan placmeent; log rolled 2/2 compression fx and pain; performed periarea hygiene with setup; needed assist with perianal hygiene side line in bed.     Cognitive/Visual Perceptual:  Cognitive/Psychosocial Skills:     -       Oriented to: Person, Place, Time and Situation   -       Follows Commands/attention:Follows one-step commands  -       Communication: clear/fluent  -       Memory: Poor immediate recall  -       Safety awareness/insight to disability: impaired   -       Mood/Affect/Coping skills/emotional control: Cooperative  Visual/Perceptual:      -Intact acuity    Physical Exam:  Balance:    -       NT  Sensation:    -       Impaired  RUE-possibly residual effects from old CVA but difficult for pt to coherently express thoughts and accurately follow through with testing. For light touch; c/o tingling   Dominant hand:    -       right  Upper Extremity Range of Motion:     -       Right Upper Extremity:  WFL  -       Left Upper Extremity: WFL except AROM shld flexion ~ 90 degrees with crepitus pain (PLOF) and guarded use; distally wfl  "  Upper Extremity Strength:    -       Right Upper Extremity: WFL  -       Left Upper Extremity: WFL except shld grossly 3/5    Strength:    -       Right Upper Extremity: WFL    -       Left Upper Extremity: WFL     AMPAC 6 Click ADL:  AMPA Total Score: 13    Treatment & Education:  Role of OT and POC. Modified toileting tech with bed pan 2/2 LBP; log roll instruction for back protection and pain management with CGA using bed rails for  placement on bed pan. Toileting supine with max assist-pt did periaea hygiene.  Education:    Patient left HOB elevated and call light in reach with all lines intact and nurse notified    Assessment:     Sheryl Martines is a 63 y.o. female with a medical diagnosis of Altered mental status.  She presents with the following performance deficits affecting function: weakness, impaired functional mobilty, impaired cognition, decreased safety awareness, gait instability, impaired endurance, decreased upper extremity function, decreased lower extremity function, impaired balance, impaired sensation, pain, orthopedic precautions.      Rehab Prognosis: Good; patient would benefit from acute skilled OT services to address these deficits and reach maximum level of function.         Clinical Decision Makin.  OT Low:  "Pt evaluation falls under low complexity for evaluation coding due to performance deficits noted in 1-3 areas as stated above and 0 co-morbities affecting current functional status. Data obtained from problem focused assessments. No modifications or assistance was required for completion of evaluation. Only brief occupational profile and history review completed."     Plan:     Patient to be seen   to address the above listed problems via self-care/home management, therapeutic activities, therapeutic exercises  · Plan of Care Expires: 18  · Plan of Care Reviewed with: patient    This Plan of care has been discussed with the patient who was involved in its " development and understands and is in agreement with the identified goals and treatment plan    GOALS:   Multidisciplinary Problems     Occupational Therapy Goals        Problem: Occupational Therapy Goal    Goal Priority Disciplines Outcome Interventions   Occupational Therapy Goal     OT, PT/OT Ongoing (interventions implemented as appropriate)    Description:  Goals to be met by: 8/31/2018    Patient will increase functional independence with ADLs by performing:    Feeding with Modified [place].  UE Dressing with Set-up Assistance.  LE Dressing with Minimal Assistance.  Grooming while seated with Set-up Assistance.  Toileting from bedside commode with Minimal Assistance for hygiene and clothing management.   Sitting at edge of bed x20 minutes with Supervision.  Rolling to Bilateral with Modified [place].   Supine to sit with Modified [place].  Stand pivot transfers with Stand-by Assistance.  Step transfer with Supervision and Stand-by Assistance  Toilet transfer to bedside commode with Stand-by Assistance.  Increased functional strength to WFL for BUE.  Upper extremity exercise program 10 reps per handout, with supervision.                      Time Tracking:     OT Date of Treatment: 08/23/18  OT Start Time: 1505  OT Stop Time: 1543  OT Total Time (min): 38 min    Billable Minutes:Evaluation 15  Self Care/Home Management 8  Total Time 38 COTX with PT    Denise Crabtree OT  8/23/2018

## 2018-08-23 NOTE — CONSULTS
"  [facility]  Adult Nutrition  Consult Note    SUMMARY     Recommendations    Recommendation/Intervention:   1. Await ST diet recs.   2. Add ADA restrictions when diet ordered    Goals:   Diet will be started within 24 hours  Nutrition Goal Status: new    Reason for Assessment  Reason for Assessment: consult(assess dietary needs)  Diagnosis: (AMS)  Relevant Medical History: CAD, DM, high cholesterol, stroke, pulmonary embolus  General Information Comments: Pt NPO.   Nutrition Discharge Planning: d/c diet per ST    Nutrition Risk Screen  Nutrition Risk Screen: no indicators present    Nutrition/Diet History  Food Preferences: unable to assess  Do you have any cultural, spiritual, Hinduism conflicts, given your current situation?: no  Factors Affecting Nutritional Intake: NPO    Anthropometrics  Temp: 98.4 °F (36.9 °C)  Height Method: Stated  Height: 5' 1" (154.9 cm)  Height (inches): 61 in  Weight Method: Stated  Weight: 81.6 kg (179 lb 14.3 oz)  Weight (lb): 179.9 lb  Ideal Body Weight (IBW), Female: 105 lb  % Ideal Body Weight, Female (lb): 171.33 lb  BMI (Calculated): 34.1  BMI Grade: 30 - 34.9- obesity - grade I     Lab/Procedures/Meds  Pertinent Labs Reviewed: reviewed  Pertinent Labs Comments: BUN 25H, Crea 1.8H, Glu 146H, Alb 2.6L  Pertinent Medications Reviewed: reviewed  Pertinent Medications Comments: aspirin, Lasix    Physical Findings/Assessment  Overall Physical Appearance: overweight  Tubes: (none)  Oral/Mouth Cavity: (unable to assess)  Skin: (Finn 20-intact)    Estimated/Assessed Needs  Weight Used For Calorie Calculations: 81.6 kg (179 lb 14.3 oz)  Energy Calorie Requirements (kcal): 2040 (25 kcal/kg)  Energy Need Method: Kcal/kg  Protein Requirements: 65g (0.8g/kg)  Weight Used For Protein Calculations: 81.6 kg (179 lb 14.3 oz)  Fluid Need Method: RDA Method  RDA Method (mL): 2040  CHO Requirement: 175g    Nutrition Prescription Ordered  Current Diet Order: NPO    Evaluation of " Received Nutrient/Fluid Intake  Energy Calories Required: not meeting needs  Protein Required: not meeting needs  Fluid Required: not meeting needs  % Intake of Estimated Energy Needs: Other: NPO  % Meal Intake: NPO    Nutrition Risk  Level of Risk/Frequency of Follow-up: (1xweekly)     Assessment and Plan    * Altered mental status    Contributing Nutrition Diagnosis  Inadequate energy intake    Related to (etiology):   dx    Signs and Symptoms (as evidenced by):   NPO     Interventions/Recommendations (treatment strategy):  See recs    Nutrition Diagnosis Status:   New             Monitor and Evaluation  Food and Nutrient Intake: food and beverage intake  Food and Nutrient Adminstration: diet order  Physical Activity and Function: nutrition-related ADLs and IADLs  Anthropometric Measurements: weight  Biochemical Data, Medical Tests and Procedures: electrolyte and renal panel  Nutrition-Focused Physical Findings: overall appearance     Nutrition Follow-Up  RD Follow-up?: Yes

## 2018-08-23 NOTE — PT/OT/SLP PROGRESS
Occupational Therapy      Patient Name:  Sehryl Martines   MRN:  [id number]    Patient not seen today secondary to (Pt. getting EEG at bedside.). Will follow-up as able.    Denise Crabtree OT  8/23/2018

## 2018-08-23 NOTE — ED PROVIDER NOTES
Encounter Date: 8/22/2018       History     Chief Complaint   Patient presents with    Altered Mental Status     To ER with EMS stating that the family called for altered mental status but was awake and alert when they arrived.  pt with slurred speech and has slept alot today, taking pain medication for an injury one week ago.     63F with HTN, DM, CAD, and hx of CVA with residual right sided weakness was brought in by EMS for AMS.  Pt's daughter reports pt was at home alone today while she was working.  She went home at 3pm and everything seemed fine.  She left and returned around 8pm and noted her mother had slurred speech.  She was concerned b/c she has hx of a stroke although she did not have slurred speech with her previous stroke.  She also reports that her mother acts like this when she takes pain medication and she was prescribed percocet on Monday.  She was seen in this ER on 8/14 after a fall.  She had back pain and had xrays done.  She saw her pain doctor in [place] on Monday and he reviewed the xrays and thought she had new compression fractures, so he prescribed percocet.  She reports continued back pain and admits to taking percocet for her pain.  It is uncertain how much she is taking.  She did not take any of her other prescription medications today.          Review of patient's allergies indicates:   Allergen Reactions    Ace inhibitors Swelling    Corticosteroids (glucocorticoids)     Hydralazine analogues     Tetracyclines Swelling    Travatan (with benzalkonium) [travoprost (benzalkonium)]      Past Medical History:   Diagnosis Date    Asthma     Coronary artery disease     Diabetes mellitus     Glaucoma     High cholesterol     Hypertension     Iritis     Pulmonary embolus     Stroke     rt sided weakness.     Past Surgical History:   Procedure Laterality Date    ABDOMINAL SURGERY      BACK SURGERY      stimulator    CATARACT EXTRACTION      HYSTERECTOMY       History reviewed. No  pertinent family history.  Social History     Tobacco Use    Smoking status: Never Smoker   Substance Use Topics    Alcohol use: No     Frequency: Never    Drug use: No     Review of Systems   Musculoskeletal: Positive for back pain.   Neurological: Positive for speech difficulty.   All other systems reviewed and are negative.      Physical Exam     Initial Vitals [08/22/18 2115]   BP Pulse Resp Temp SpO2   (!) 148/88 80 20 98.5 °F (36.9 °C) 95 %      MAP       --         Physical Exam    Nursing note and vitals reviewed.  Constitutional: She appears well-developed and well-nourished. No distress.   drowsy   HENT:   Head: Normocephalic and atraumatic.   Mouth/Throat: Mucous membranes are dry.   Eyes:   Crusting around eyes   Neck: Normal range of motion.   Cardiovascular: Normal rate, regular rhythm and normal heart sounds.   No murmur heard.  Pulmonary/Chest: Breath sounds normal. No respiratory distress.   Abdominal: Soft. She exhibits no distension. There is no tenderness.   Musculoskeletal: Normal range of motion.   Neurological: She has normal strength. No cranial nerve deficit or sensory deficit. GCS eye subscore is 4. GCS verbal subscore is 5. GCS motor subscore is 6.   Oriented to person and place, not time   Skin: Skin is warm and dry.   Psychiatric:   Calm, cooperative         ED Course   Critical Care  Date/Time: 9/2/2018 6:44 PM  Performed by: Denise Blackmon MD  Authorized by: Denise Blackmon MD   Total critical care time (exclusive of procedural time) : 45 minutes  Critical care time was exclusive of separately billable procedures and treating other patients.  Critical care was necessary to treat or prevent imminent or life-threatening deterioration of the following conditions: cardiac failure, circulatory failure, CNS failure or compromise, endocrine crisis, dehydration, metabolic crisis, renal failure, sepsis, shock and toxidrome.  Critical care was time spent personally by me on the following  activities: development of treatment plan with patient or surrogate, discussions with consultants, interpretation of cardiac output measurements, evaluation of patient's response to treatment, examination of patient, obtaining history from patient or surrogate, ordering and performing treatments and interventions, ordering and review of laboratory studies, ordering and review of radiographic studies, pulse oximetry, re-evaluation of patient's condition and review of old charts.        Labs Reviewed   CBC W/ AUTO DIFFERENTIAL - Abnormal; Notable for the following components:       Result Value    RBC 3.99 (*)     Hemoglobin 9.4 (*)     Hematocrit 32.4 (*)     MCV 81 (*)     MCH 23.6 (*)     MCHC 29.0 (*)     RDW 16.1 (*)     All other components within normal limits   COMPREHENSIVE METABOLIC PANEL - Abnormal; Notable for the following components:    CO2 34 (*)     Glucose 235 (*)     BUN, Bld 26 (*)     Creatinine 2.5 (*)     Albumin 2.8 (*)     eGFR if  23 (*)     eGFR if non  20 (*)     All other components within normal limits   TROPONIN I - Abnormal; Notable for the following components:    Troponin I 0.273 (*)     All other components within normal limits   SALICYLATE LEVEL - Abnormal; Notable for the following components:    Salicylate Lvl <5.0 (*)     All other components within normal limits   ACETAMINOPHEN LEVEL - Abnormal; Notable for the following components:    Acetaminophen (Tylenol), Serum <3.0 (*)     All other components within normal limits   DRUG SCREEN PANEL, URINE EMERGENCY - Abnormal; Notable for the following components:    Creatinine, Random Ur 345.4 (*)     All other components within normal limits    Narrative:     Preferred Collection Type->Urine, Clean Catch   URINALYSIS, REFLEX TO URINE CULTURE - Abnormal; Notable for the following components:    Specific Gravity, UA >=1.030 (*)     Protein, UA Trace (*)     Bilirubin (UA) 1+ (*)     All other components  within normal limits    Narrative:     Preferred Collection Type->Urine, Clean Catch   POCT GLUCOSE - Abnormal; Notable for the following components:    POCT Glucose 241 (*)     All other components within normal limits   MAGNESIUM   PHOSPHORUS   ALCOHOL,MEDICAL (ETHANOL)   TSH   POCT GLUCOSE MONITORING CONTINUOUS     EKG Readings: (Independently Interpreted)   Initial Reading: No STEMI. Rhythm: Normal Sinus Rhythm. Heart Rate: 73. Ectopy: No Ectopy. Conduction: Normal. ST Segments: Normal ST Segments. T Waves: Normal. Axis: Left Axis Deviation. Other Findings: Prolonged QT Interval. Clinical Impression: Normal Sinus Rhythm       Imaging Results          CT Head Without Contrast (Final result)  Result time 08/22/18 22:19:26    Final result by Darwin Cool MD (08/22/18 22:19:26)                 Impression:      1. Apparent punctate foci of parenchymal hypoattenuation within the bilateral thalami concerning for age indeterminate lacunar infarcts, presumably old.  Clinical correlation is recommended.      Electronically signed by: Darwin Cool MD  Date:    08/22/2018  Time:    22:19             Narrative:    EXAMINATION:  CT HEAD WITHOUT CONTRAST    CLINICAL HISTORY:  Confusion/delirium, altered LOC, unexplained;    TECHNIQUE:  5-mm axial images were obtained through the head without the use of contrast.  Coronal and sagittal reformats were performed.    COMPARISON:  None.    FINDINGS:  Punctate foci of parenchymal hypoattenuation noted within the bilateral thalami concerning for age indeterminate lacunar infarcts, presumably old.  No CT findings to suggest an acute major vascular distribution infarct.  No intra or extra-axial hemorrhage.  No midline shift or mass effect.  No hydrocephalus.  Sellar region is unremarkable.    Paranasal sinuses and mastoid air cells are clear.  There is heterogeneity of the calvarium, possibly related to an underlying metabolic disorder.  Subcutaneous soft tissues are normal.                                X-Ray Chest 1 View (Final result)  Result time 08/22/18 22:21:33    Final result by Darwin Cool MD (08/22/18 22:21:33)                 Impression:      1. No acute radiographic findings in the chest on this single view.      Electronically signed by: Darwin Cool MD  Date:    08/22/2018  Time:    22:21             Narrative:    EXAMINATION:  XR CHEST 1 VIEW    CLINICAL HISTORY:  AMS;    TECHNIQUE:  Single frontal view of the chest was performed.    COMPARISON:  None    FINDINGS:  Partially visualized catheter projects to the right of midline, presumably over the SVC.  Spinal cord stimulator projects over the midline thorax.  Mediastinal structures are midline.  Cardiac silhouette is magnified by AP technique.  Lung volumes are symmetric.  No consolidation.  No pneumothorax or pleural effusions.  No free air beneath the diaphragm.  Degenerative changes of the shoulders noted.                                 Medical Decision Making:   History:   I obtained history from: someone other than patient.       <> Summary of History: daughter  Clinical Tests:   Lab Tests: Ordered and Reviewed  Radiological Study: Ordered and Reviewed  Medical Tests: Ordered and Reviewed  ED Management:  63F with drowsiness and slurred speech.  She is drowsy but arousable on my exam with no focal neuro deficits but confusion at times.  Work up shows elevated renal function and troponin.  No signs of AMI on EKG.  No acute CVA on head CT.  Will admit for further evaluation and monitoring of neuro status, renal function and trend troponins.  While in ER, occasional twitches noted.  Will give home meds.  Admit to Family Medicine.  Other:   I have discussed this case with another health care provider.                      Clinical Impression:   The primary encounter diagnosis was Altered mental status, unspecified altered mental status type. Diagnoses of NIKHIL (acute kidney injury) and Elevated troponin were also  pertinent to this visit.                             Denise Blackmon MD  08/23/18 0047       Denise Blackmon MD  09/02/18 2253

## 2018-08-23 NOTE — NURSING
Patient arrived to  via stretcher with escort service and patient family. Patient AAO x4. Delayed speech with answering questions. Patient states positioning helps alleviate back pain. IV site to LAC c/d/i. Tele monitor placed on patient as per orders.  Slight right sided weakness noted. Patient states uses walker at home to ambulate. For further data refer to admission assessment data sheets. Will cont to monitor pt and intervene prn.

## 2018-08-23 NOTE — PROCEDURES
DATE OF PROCEDURE:  08/23/2018.    EEG#:  OK-.    REFERRING PHYSICIAN:  Dr. Wilkes    This EEG was performed to assess for subclinical seizures.    ELECTROENCEPHALOGRAM REPORT    METHODOLOGY:  Electroencephalographic (EEG) recording is recorded with   electrodes placed according to the International 10-20 placement system.  Thirty   two (32) channels of digital signal (sampling rate of 512/sec), including T1   and T2, were simultaneously recorded from the scalp and may include EKG, EMG,   and/or eye monitors.  Recording band pass was 0.1 to 512 Hz.  Digital video   recording of the patient is simultaneously recorded with the EEG.  The patient   is instructed to report clinical symptoms which may occur during the recording   session.  EEG and video recording are stored and archived in digital format.    Activation procedures, which include photic stimulation, hyperventilation and   instructing patients to perform simple tasks, are done in selected patients.  The EEG is displayed on a monitor screen and can be reviewed using different   montages.  Computer assisted-analysis is employed to detect spike and   electrographic seizure activity.   The entire record is submitted for computer   analysis.  The entire recording is visually reviewed, and the times identified   by computer analysis as being spikes or seizures are reviewed again.  Compressed spectral analysis (CSA) is also performed on the activity recorded   from each individual channel.  This is displayed as a power display of   frequencies from 0 to 30 Hz over time.   The CSA is reviewed looking for   asymmetries in power between homologous areas of the scalp, then compared with   the original EEG recording.  [employer] software was also utilized in the review of this study.  This software   suite analyzes the EEG recording in multiple domains.  Coherence and rhythmicity   are computed to identify EEG sections which may contain organized seizures.    Each  channel undergoes analysis to detect the presence of spike and sharp waves   which have special and morphological characteristics of epileptic activity.  The   routine EEG recording is converted from special into frequency domain.  This is   then displayed comparing homologous areas to identify areas of significant   asymmetry.  Algorithm to identify non-cortically generated artifact is used to   separate artifact from the EEG.    EEG FINDINGS:  The recording was obtained with a number of standard bipolar and   referential montages during wakefulness, drowsiness and sleep.  In the alert   state, the background was a nonsustained posterior dominant rhythm of 8 to 9 Hz,   which reacted symmetrically to eye opening.  Activation procedures not   performed.  During drowsiness, the background rhythm waxed and waned and there   were periods of slowing.  Periods of generalized synchronous rhythmical delta   activity were noted.  In addition, infrequent triphasic morphology waves were   noted.  During stage II sleep, symmetric V waves and sleep spindles were noted.    There were no interictal epileptiform and there were no clinical or   electrographic seizures were recorded.    The EKG channel revealed sinus rhythm.    IMPRESSION:  This is an abnormal EEG during wakefulness, drowsiness and sleep.    Diffuse disorganized slowing in the background was noted.  Generalized   synchronous rhythmical delta activity was noted.  Infrequent triphasic waves   were noted.    CLINICAL CORRELATION:  The patient is a 63-year-old female who presented with   slurred speech and altered sensorium.  The patient is currently not maintained   on any anti-seizure medications.  This is an abnormal EEG during wakefulness,   drowsiness and sleep.  The overall degree of slowing and disorganization is   suggestive of a moderate degree of encephalopathy, nonspecific to the cause.    The presence of generalized synchronous rhythmical delta activity and  triphasic   waves further supports a diagnosis of encephalopathy, likely a metabolic   encephalopathy.  There is no evidence of an epileptic process on this recording.    No seizures were recorded during this study.      FAK/LUCITA  dd: 08/23/2018 14:55:13 (CDT)  td: 08/23/2018 15:11:27 (CDT)  Doc ID   #[id number]  Job ID #[id number]    CC:

## 2018-08-23 NOTE — PROGRESS NOTES
"PGY-2 Progress Note [facility]  Follow up for: CVA evaluation     Chief Complaint   Patient presents with    Altered Mental Status     To ER with EMS stating that the family called for altered mental status but was awake and alert when they arrived.  pt with slurred speech and has slept alot today, taking pain medication for an injury one week ago.     Hospital Stay Day 0    Subjective: Afebrile, allowing permissive HTN, good uop, patient reports back pain related from fall on Monday.  Daughter is present at bedside and is upset that her mother has not been assigned a room yet.      Denies any CP, SOB, N/V/D, headaches, fever or chills.     Scheduled Meds:   aspirin  81 mg Oral Daily    atorvastatin  40 mg Oral Daily    clopidogrel  75 mg Oral Daily    diltiaZEM  180 mg Oral Daily    furosemide  40 mg Oral BID    lidocaine  1 patch Transdermal Q24H    sodium chloride 0.9%  3 mL Intravenous Q8H     Continuous Infusions:   0/9% NACL & POTASSIUM CHLORIDE 20 MEQ/L Stopped (18 1240)     PRN Meds:albuterol, dextrose 50%, glucagon (human recombinant), insulin aspart U-100, labetalol, ondansetron    Review of patient's allergies indicates:   Allergen Reactions    Ace inhibitors Swelling    Corticosteroids (glucocorticoids)     Hydralazine analogues     Tetracyclines Swelling    Travatan (with benzalkonium) [travoprost (benzalkonium)]        Objectives:     Vitals(Most Recent)      BP  Min: 119/52  Max: 169/66  Temp  Av.6 °F (37 °C)  Min: 98.4 °F (36.9 °C)  Max: 98.8 °F (37.1 °C)  Pulse  Av.1  Min: 60  Max: 93  Resp  Av.3  Min: 14  Max: 21  SpO2  Av.5 %  Min: 94 %  Max: 100 %  Height  Av' 1" (154.9 cm)  Min: 5' 1" (154.9 cm)  Max: 5' 1" (154.9 cm)  Weight  Av.6 kg (179 lb 15.4 oz)  Min: 81.6 kg (179 lb 14.3 oz)  Max: 81.6 kg (180 lb)             Vitals([id number])  Temp:  [98.4 °F (36.9 °C)-98.8 °F (37.1 °C)]   Pulse:  [60-93]   Resp:  [14-21]   BP: (119-169)/(45-88)   SpO2:  [94 " %-100 %]     I & O([id number])    Intake/Output Summary (Last 24 hours) at 8/23/2018 1727  Last data filed at 8/23/2018 1500  Gross per 24 hour   Intake 1000 ml   Output 450 ml   Net 550 ml     Urinalysis  Recent Labs   Lab  08/22/18   2247   COLORU  Yellow   SPECGRAV  >=1.030*   PHUR  6.0   PROTEINUA  Trace*   NITRITE  Negative   LEUKOCYTESUR  Negative   UROBILINOGEN  1.0     Constitutional: Patient is oriented to person, place, but not time  HENT: Head: Normocephalic   Eyes: Dry skin on eyelids.  EOM are normal. Pupils are equal, round, and reactive to light. Neck: Normal range of motion. Neck supple. No JVD present. No tracheal deviation present.   Cardiovascular: Normal rate, regular rhythm, normal heart sounds and intact distal pulses.  Exam reveals no gallop and no friction rub.  No murmur heard.  Pulmonary/Chest: Effort normal and breath sounds normal. No respiratory distress.   Abdominal: Soft. Bowel sounds are normal. Patient exhibits no distension and no mass. There is no tenderness.   Musculoskeletal: Normal range of motion. Patient exhibits no edema.  Skin: Skin is warm and dry. No rash noted. No erythema. No pallor.   Psychiatric: unable to ascess   Neuro: patient not participating in exam, difficult to obtain    LABS  CBC  Recent Labs   Lab  08/22/18 2148   WBC  10.48   RBC  3.99*   HGB  9.4*   HCT  32.4*   PLT  246   MCV  81*   MCH  23.6*   MCHC  29.0*     BMP  Recent Labs   Lab  08/22/18 2148 08/23/18   0420   NA  141  140   K  3.7  3.5   CO2  34*  34*   CL  98  98   BUN  26*  25*   CREATININE  2.5*  1.8*   GLU  235*  146*       POCT-Glucose  POCT Glucose   Date Value Ref Range Status   08/23/2018 172 (H) 70 - 110 mg/dL Final   08/23/2018 115 (H) 70 - 110 mg/dL Final   08/23/2018 241 (H) 70 - 110 mg/dL Final   08/22/2018 241 (H) 70 - 110 mg/dL Final       Recent Labs   Lab  08/22/18 2148 08/23/18 0217  08/23/18   0420   CALCIUM  9.9   --   9.7   MG  1.7  2.1  1.6   PHOS  4.2  3.8  3.5      LFT  Recent Labs   Lab  08/22/18 2148 08/23/18   0420   PROT  6.9  6.6   ALBUMIN  2.8*  2.6*   BILITOT  0.4  0.4   AST  23  19   ALKPHOS  93  97   ALT  19  18     COAGS  Recent Labs   Lab  08/23/18   0420   INR  0.9   APTT  29.0     CE  Recent Labs   Lab  08/22/18 2148 08/23/18   0420   TROPONINI  0.273*  0.267*     BNP  Recent Labs   Lab  08/23/18   0217   BNP  25     UA  Recent Labs   Lab  08/22/18   2247   COLORU  Yellow   SPECGRAV  >=1.030*   PHUR  6.0   PROTEINUA  Trace*     LAST HbA1c  Lab Results   Component Value Date    HGBA1C 8.1 (H) 08/23/2018           Imaging  Imaging Results          US Carotid Bilateral (Final result)  Result time 08/23/18 09:08:55    Final result by Marc Lemos DO (08/23/18 09:08:55)                 Impression:      Limited study by body habitus.    Allowing for limitation, 50-69% right ICA stenosis with less than 50% left ICA stenosis.    Antegrade vertebral arteries bilaterally..      Electronically signed by: Marc Lemos DO  Date:    08/23/2018  Time:    09:08             Narrative:    EXAMINATION:  US CAROTID BILATERAL    CLINICAL HISTORY:  stroke; Altered mental status, unspecified    TECHNIQUE:  Grayscale and color Doppler ultrasound examination of the carotid and vertebral artery systems bilaterally.    COMPARISON:  None.    FINDINGS:  Results: the right common carotid artery is patent. The peak systolic velocities approximately 47 cm/sec which is within normal limits.  . The peak systolic velocities of the right proximal, mid and distal internal carotid artery are approximately 47, 78, and 178 cm/sec respectively this corresponds to an ICA/CCA ratio of approximately 3.7 which is within normal limits.  Please note evaluation limited by body habitus with difficulty penetrating within limits of the study increased velocity the concerning for 50-69% ICA stenosis.    The right vertebral artery is antegrade.    The left common carotid artery is patent. The peak  systolic velocity is approximately 45 cm/sec which is within normal limits. Peak systolic velocity of the proximal, mid and distal left internal carotid arteries is approximately 38, 49, and 49 cm/sec respectively. This corresponds to an ICA/CCA ratio of approximately 1.1 which is within normal limits.    The left vertebral artery is antegrade.    Measurement of carotid stenosis is based on velocity parameters that correlate the residual internal carotid diameter with [place] [employer] symptomatic carotid endarterectomy trial (NASCET) - based stenosis levels.                               CT Head Without Contrast (Final result)  Result time 08/22/18 22:19:26    Final result by Darwin Cool MD (08/22/18 22:19:26)                 Impression:      1. Apparent punctate foci of parenchymal hypoattenuation within the bilateral thalami concerning for age indeterminate lacunar infarcts, presumably old.  Clinical correlation is recommended.      Electronically signed by: Darwin Cool MD  Date:    08/22/2018  Time:    22:19             Narrative:    EXAMINATION:  CT HEAD WITHOUT CONTRAST    CLINICAL HISTORY:  Confusion/delirium, altered LOC, unexplained;    TECHNIQUE:  5-mm axial images were obtained through the head without the use of contrast.  Coronal and sagittal reformats were performed.    COMPARISON:  None.    FINDINGS:  Punctate foci of parenchymal hypoattenuation noted within the bilateral thalami concerning for age indeterminate lacunar infarcts, presumably old.  No CT findings to suggest an acute major vascular distribution infarct.  No intra or extra-axial hemorrhage.  No midline shift or mass effect.  No hydrocephalus.  Sellar region is unremarkable.    Paranasal sinuses and mastoid air cells are clear.  There is heterogeneity of the calvarium, possibly related to an underlying metabolic disorder.  Subcutaneous soft tissues are normal.                               X-Ray Chest 1 View (Final result)  Result  time 08/22/18 22:21:33    Final result by Darwin Cool MD (08/22/18 22:21:33)                 Impression:      1. No acute radiographic findings in the chest on this single view.      Electronically signed by: Darwin Cool MD  Date:    08/22/2018  Time:    22:21             Narrative:    EXAMINATION:  XR CHEST 1 VIEW    CLINICAL HISTORY:  AMS;    TECHNIQUE:  Single frontal view of the chest was performed.    COMPARISON:  None    FINDINGS:  Partially visualized catheter projects to the right of midline, presumably over the SVC.  Spinal cord stimulator projects over the midline thorax.  Mediastinal structures are midline.  Cardiac silhouette is magnified by AP technique.  Lung volumes are symmetric.  No consolidation.  No pneumothorax or pleural effusions.  No free air beneath the diaphragm.  Degenerative changes of the shoulders noted.                                Assessment/Plan:   63 y.o. 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      CVA    CT head is negative for acute hemorrhagic event  Anti-platelet therapy: ASA, continue home plavix  Atorvastatin for secondary stroke prevention   Labs: HgA1C, Lipid Panel, TSH, Vitamin B-12, B6, Thiamine, Folate, RPR  Neurology consulted  admit to tele  Q4 nuero checks  Carotid Doppler ordered  2D echo with bubble study  CTA Head and neck ordered  PT/OT/ST evaluation  NPO pending speech evaluation   Elevate the head of the bed with aspiration precautions  Fall precautions  hold home blood pressure medications to allow for permissive hypertension    IV labetolol PRN for SBP >220 or DBP >120Will provide stroke education prior to discharge  EEG ordered  Drug screen pos for opiates      HTN  Holding home medications at this time    T2DM  - will cover with SSI    CAD  - Continue ASA and plavix    Elevated troponin  - 0.273-->0.267  Continue to trend and monitor    PT/OT: ordered  and following  Code: full  Diet:  NPO until swallow assessment  Ppx: dvt: lovenox   Dispo: f/u neuro recs and imaging     Case discussed with staff    Leta Jovel MD  [facility]  08/23/2018

## 2018-08-23 NOTE — CONSULTS
[facility] Consult Note    Reason for Consult -   CVA    History of Present Illness -   Ms. Martines is a 64 y/o F w/ a history of HTN, Type 2 DM, CAD, and hx of CVA a pontine infarction.    Per ED documentation: She presented to [facility] via [place] with her daughter and grandson. Daughter reports that she thinks she took too much pain medication. She has not been acting herself since around 8:30 PM on 8/21/18. Pt reported had AMS with slurred speech and lethargy. She has been prescribed percocet due to back pain that is prescribed by a pain medicine doctor in [place]. It is unclear how much percocet she has been prescribed or is taking for pain.     Ms. Martines's daughter is present at bedside and was able to provide information when needed. Mr. Martines is somnolent and often has her eyes closed. At times her responses are delayed, but she responds and answers questions appropriately. Her daughter reports that her mother was confused and had trouble speaking appropriately. She said that she had trouble expressing herself, and she became concerned that her mother may have been having a stroke. Ms. Martines is unable to state how much pain medication she has taken. The EEG results were reviewed with the patient and her family. All questions were answered    Review of Systems -  GENERAL: No fever. No chills. No fatigue. Denies weight loss. Denies weight gain.  HEENT: Denies headaches. Denies vision change. Denies eye pain. Denies double vision. Denies ear pain.   CV: No chest pain.  CHEST: Denies of shortness of breath.  GI: Denies constipation. No diarrhea. No abdominal pain. Denies nausea. Denies vomiting. No blood in stool.  HEME: Denies bleeding problems.  : Denies urgency. No painful urination. No blood in urine.  MS: + back pain  SKIN: Denies rash.   NEURO: Denies seizures. No weakness.  PSYCH:  Denies difficulty sleeping. No anxiety. Denies depression. No suicidal thoughts.       Past Medical History - as  "above  Surgical History - spinal stimulator placement  Social History - non-contributory  Family History - non-contributory  Allergies - ACE inhibitors, corticosteroids, hydralazine analogs, tetracyclines, travatan    Vitals -     Vitals:    18 0900 18 1000 18 1300 18 1400   BP:    129/60   BP Location:    Right arm   Patient Position:    Lying   Pulse: 66 66     Resp:    20   Temp:       TempSrc:       SpO2:  98%     Weight:   81.6 kg (179 lb 14.3 oz)    Height:   5' 1" (1.549 m)        Neurological Exam -     Orientation to time & place:  Oriented to time, place, person and situation. , year, month, and day/date. She was able to identify her daughter and grandson. Her eyes were frequently closed but made appropriate eye contact throughout the interview/exam.        Speech:  No aphasia, no dysarthria     Cranial nerves: visual fields full to confrontation, PERRL, EOMI, facial sensation and expression symmetic, hearing grossly intact, palate raises midline, shoulder shrug strength normal, tongue protrudes midline.      Musculoskeletal:          Muscle strength:  5/5 in bilateral upper extremities. No pronator drift  She is lying in the L lateral decubitus position. Exam of the bilateral lower extremities deferred due to     Sensation: Intact to light touch, pin prick, vibration in all extremities      Deep tendon Reflexes: 2+ bilateral triceps, biceps, and brachioradialis; 2+ in bilateral patellae. Positive ankle jerks. Plantar flexor responses bilaterally    Cerebellar and Coordination:  Gait:  normal, able to tandem without difficulty        Finger-nose: no dysmetria       Rapid Alternating Movements (pronation/supination):  R normal; L normal    Labs -    Recent Labs   Lab  18   2148  18   0420   WBC  10.48   --    HGB  9.4*   --    HCT  32.4*   --    PLT  246   --    NA  141  140   K  3.7  3.5   CL  98  98   CO2  34*  34*   BUN  26*  25*   CREATININE  2.5*  1.8*   GLU  235* "  146*   PROT  6.9  6.6   ALBUMIN  2.8*  2.6*   BILITOT  0.4  0.4   AST  23  19   ALKPHOS  93  97   ALT  19  18   INR   --   0.9   TROPONINI  0.273*  0.267*   TSH  0.902   --    [id number]   --   1059*   FOLATE   --   11.0       Imaging -   CT Head w/o contrast (8/22/18)  Punctate foci of parenchymal hypoattenuation noted within the bilateral thalami concerning for age indeterminate lacunar infarcts, presumably old.  No CT findings to suggest an acute major vascular distribution infarct.  No intra or extra-axial hemorrhage.  No midline shift or mass effect.  No hydrocephalus.  Sellar region is unremarkable.    Paranasal sinuses and mastoid air cells are clear.  There is heterogeneity of the calvarium, possibly related to an underlying metabolic disorder.  Subcutaneous soft tissues are normal.    Impression:  1. Apparent punctate foci of parenchymal hypoattenuation within the bilateral thalami concerning for age indeterminate lacunar infarcts, presumably old.  Clinical correlation is recommended.    Bilateral Carotid U/S (8/23/18)  Results: the right common carotid artery is patent. The peak systolic velocities approximately 47 cm/sec which is within normal limits.  . The peak systolic velocities of the right proximal, mid and distal internal carotid artery are approximately 47, 78, and 178 cm/sec respectively this corresponds to an ICA/CCA ratio of approximately 3.7 which is within normal limits.  Please note evaluation limited by body habitus with difficulty penetrating within limits of the study increased velocity the concerning for 50-69% ICA stenosis.    The right vertebral artery is antegrade.    The left common carotid artery is patent. The peak systolic velocity is approximately 45 cm/sec which is within normal limits. Peak systolic velocity of the proximal, mid and distal left internal carotid arteries is approximately 38, 49, and 49 cm/sec respectively. This corresponds to an ICA/CCA ratio of approximately  1.1 which is within normal limits.    The left vertebral artery is antegrade.    Impression:  Limited study by body habitus.  Allowing for limitation, 50-69% right ICA stenosis with less than 50% left ICA stenosis.  Antegrade vertebral arteries bilaterally..    CTA Head/Neck pending    EEG (8/23/18)  IMPRESSION:  This is an abnormal EEG during wakefulness, drowsiness and sleep. Diffuse disorganized slowing in the background was noted.  Generalized synchronous rhythmical delta activity was noted.  Infrequent triphasic waves were noted.     Assessment and Plan -   Ms. Martines is a 62 y/o F w/ a history of HTN, Type 2 DM, CAD, and hx of CVA who presented by EMS with family concerned for opiate intoxication vs CVA due to symptoms of AMS and slurred speech. EEG demonstrates triphasic waves concerning for metabolic encephalopathy. No epileptiform activity noted. Ms. Martines is currently taking DAPT as an outpatient.    Altered mental status:  -Given the EEG findings and the improvement of her speech and mental status since the beginning of her hospital stay, it is likely that her symptoms are associated with pain medication overuse.  -However, given her history of stroke risk factors and previous infarction, a stroke will be must be ruled out.  -She is unable to have an MRI due to the spinal stimulator.  -CT Head: bilateral lacunar infarcts  -Carotid U/S demonstrates Right ICA and Left ICA stenosis.  -LDL: 86.6  -HgbA1c: 8.1  Recommendations:  -CT Head w/o contrast 24 hrs from the initial CT head.  -Agree with permissive HTN at this time.  -Cont DAPT and atorvastatin  -Stroke risk factor modification per the primary team  -Avoid any sedating medications.    If there are any questions/concerns regarding the above consult/recommendations, please contact the on-call [facility] resident.    Servando Silver MD  [facility] PGY 4

## 2018-08-24 LAB
25(OH)D3+25(OH)D2 SERPL-MCNC: 26 NG/ML
ALBUMIN SERPL BCP-MCNC: 2.5 G/DL
ALP SERPL-CCNC: 105 U/L
ALT SERPL W/O P-5'-P-CCNC: 13 U/L
AMMONIA PLAS-SCNC: 22 UMOL/L
ANION GAP SERPL CALC-SCNC: 12 MMOL/L
ANION GAP SERPL CALC-SCNC: 13 MMOL/L
ANISOCYTOSIS BLD QL SMEAR: SLIGHT
AST SERPL-CCNC: 17 U/L
BASOPHILS NFR BLD: 0 %
BILIRUB SERPL-MCNC: 0.8 MG/DL
BUN SERPL-MCNC: 16 MG/DL
BUN SERPL-MCNC: 20 MG/DL
CALCIUM SERPL-MCNC: 9.6 MG/DL
CALCIUM SERPL-MCNC: 9.7 MG/DL
CHLORIDE SERPL-SCNC: 91 MMOL/L
CHLORIDE SERPL-SCNC: 93 MMOL/L
CO2 SERPL-SCNC: 31 MMOL/L
CO2 SERPL-SCNC: 32 MMOL/L
CREAT SERPL-MCNC: 1.3 MG/DL
CREAT SERPL-MCNC: 1.7 MG/DL
CRP SERPL-MCNC: 366.18 MG/L
CRP SERPL-MCNC: 366.2 MG/L
DIASTOLIC DYSFUNCTION: YES
DIFFERENTIAL METHOD: ABNORMAL
EOSINOPHIL NFR BLD: 0 %
ERYTHROCYTE [DISTWIDTH] IN BLOOD BY AUTOMATED COUNT: 15.8 %
ERYTHROCYTE [SEDIMENTATION RATE] IN BLOOD BY WESTERGREN METHOD: 104 MM/HR
EST. GFR  (AFRICAN AMERICAN): 36 ML/MIN/1.73 M^2
EST. GFR  (AFRICAN AMERICAN): 50 ML/MIN/1.73 M^2
EST. GFR  (NON AFRICAN AMERICAN): 32 ML/MIN/1.73 M^2
EST. GFR  (NON AFRICAN AMERICAN): 44 ML/MIN/1.73 M^2
ESTIMATED PA SYSTOLIC PRESSURE: 13.5
GLUCOSE SERPL-MCNC: 183 MG/DL
GLUCOSE SERPL-MCNC: 238 MG/DL
HCT VFR BLD AUTO: 33.1 %
HGB BLD-MCNC: 9.7 G/DL
HYPOCHROMIA BLD QL SMEAR: ABNORMAL
LACTATE SERPL-SCNC: 1.9 MMOL/L
LYMPHOCYTES NFR BLD: 17 %
MAGNESIUM SERPL-MCNC: 1.2 MG/DL
MCH RBC QN AUTO: 23.4 PG
MCHC RBC AUTO-ENTMCNC: 29.3 G/DL
MCV RBC AUTO: 80 FL
MONOCYTES NFR BLD: 7 %
NEUTROPHILS NFR BLD: 74 %
NEUTS BAND NFR BLD MANUAL: 2 %
PHOSPHATE SERPL-MCNC: 3.7 MG/DL
PLATELET # BLD AUTO: 276 K/UL
PLATELET BLD QL SMEAR: ABNORMAL
PMV BLD AUTO: 9.5 FL
POCT GLUCOSE: 149 MG/DL (ref 70–110)
POCT GLUCOSE: 183 MG/DL (ref 70–110)
POCT GLUCOSE: 217 MG/DL (ref 70–110)
POCT GLUCOSE: 230 MG/DL (ref 70–110)
POTASSIUM SERPL-SCNC: 3.2 MMOL/L
POTASSIUM SERPL-SCNC: 3.5 MMOL/L
PROT SERPL-MCNC: 6.5 G/DL
RBC # BLD AUTO: 4.15 M/UL
RETIRED EF AND QEF - SEE NOTES: 60 (ref 55–65)
SODIUM SERPL-SCNC: 135 MMOL/L
SODIUM SERPL-SCNC: 137 MMOL/L
WBC # BLD AUTO: 17.16 K/UL

## 2018-08-24 PROCEDURE — 97530 THERAPEUTIC ACTIVITIES: CPT | Performed by: PHYSICAL THERAPIST

## 2018-08-24 PROCEDURE — 85027 COMPLETE CBC AUTOMATED: CPT

## 2018-08-24 PROCEDURE — 25500020 PHARM REV CODE 255: Performed by: FAMILY MEDICINE

## 2018-08-24 PROCEDURE — 86140 C-REACTIVE PROTEIN: CPT

## 2018-08-24 PROCEDURE — 83605 ASSAY OF LACTIC ACID: CPT

## 2018-08-24 PROCEDURE — A4216 STERILE WATER/SALINE, 10 ML: HCPCS | Performed by: STUDENT IN AN ORGANIZED HEALTH CARE EDUCATION/TRAINING PROGRAM

## 2018-08-24 PROCEDURE — 63600175 PHARM REV CODE 636 W HCPCS: Performed by: STUDENT IN AN ORGANIZED HEALTH CARE EDUCATION/TRAINING PROGRAM

## 2018-08-24 PROCEDURE — 21400001 HC TELEMETRY ROOM

## 2018-08-24 PROCEDURE — 84100 ASSAY OF PHOSPHORUS: CPT

## 2018-08-24 PROCEDURE — 82140 ASSAY OF AMMONIA: CPT

## 2018-08-24 PROCEDURE — 25000003 PHARM REV CODE 250: Performed by: STUDENT IN AN ORGANIZED HEALTH CARE EDUCATION/TRAINING PROGRAM

## 2018-08-24 PROCEDURE — 25000003 PHARM REV CODE 250: Performed by: OPHTHALMOLOGY

## 2018-08-24 PROCEDURE — 63600175 PHARM REV CODE 636 W HCPCS: Performed by: FAMILY MEDICINE

## 2018-08-24 PROCEDURE — G8978 MOBILITY CURRENT STATUS: HCPCS | Mod: CL | Performed by: PHYSICAL THERAPIST

## 2018-08-24 PROCEDURE — 83735 ASSAY OF MAGNESIUM: CPT

## 2018-08-24 PROCEDURE — 94640 AIRWAY INHALATION TREATMENT: CPT

## 2018-08-24 PROCEDURE — 94761 N-INVAS EAR/PLS OXIMETRY MLT: CPT

## 2018-08-24 PROCEDURE — 85652 RBC SED RATE AUTOMATED: CPT

## 2018-08-24 PROCEDURE — 86141 C-REACTIVE PROTEIN HS: CPT

## 2018-08-24 PROCEDURE — 85007 BL SMEAR W/DIFF WBC COUNT: CPT

## 2018-08-24 PROCEDURE — 97530 THERAPEUTIC ACTIVITIES: CPT

## 2018-08-24 PROCEDURE — 80048 BASIC METABOLIC PNL TOTAL CA: CPT

## 2018-08-24 PROCEDURE — 25000242 PHARM REV CODE 250 ALT 637 W/ HCPCS: Performed by: STUDENT IN AN ORGANIZED HEALTH CARE EDUCATION/TRAINING PROGRAM

## 2018-08-24 PROCEDURE — 97535 SELF CARE MNGMENT TRAINING: CPT

## 2018-08-24 PROCEDURE — 80053 COMPREHEN METABOLIC PANEL: CPT

## 2018-08-24 PROCEDURE — G8979 MOBILITY GOAL STATUS: HCPCS | Mod: CJ | Performed by: PHYSICAL THERAPIST

## 2018-08-24 PROCEDURE — 36415 COLL VENOUS BLD VENIPUNCTURE: CPT

## 2018-08-24 PROCEDURE — 82306 VITAMIN D 25 HYDROXY: CPT

## 2018-08-24 PROCEDURE — 25000003 PHARM REV CODE 250: Performed by: FAMILY MEDICINE

## 2018-08-24 RX ORDER — POTASSIUM CHLORIDE 20 MEQ/15ML
20 SOLUTION ORAL ONCE
Status: COMPLETED | OUTPATIENT
Start: 2018-08-24 | End: 2018-08-24

## 2018-08-24 RX ORDER — ENOXAPARIN SODIUM 100 MG/ML
40 INJECTION SUBCUTANEOUS EVERY 24 HOURS
Status: DISCONTINUED | OUTPATIENT
Start: 2018-08-24 | End: 2018-08-27

## 2018-08-24 RX ORDER — HYDROCODONE BITARTRATE AND ACETAMINOPHEN 5; 325 MG/1; MG/1
1 TABLET ORAL ONCE
Status: COMPLETED | OUTPATIENT
Start: 2018-08-24 | End: 2018-08-24

## 2018-08-24 RX ORDER — IPRATROPIUM BROMIDE AND ALBUTEROL SULFATE 2.5; .5 MG/3ML; MG/3ML
3 SOLUTION RESPIRATORY (INHALATION) EVERY 4 HOURS
Status: DISCONTINUED | OUTPATIENT
Start: 2018-08-24 | End: 2018-08-29

## 2018-08-24 RX ORDER — LOPERAMIDE HYDROCHLORIDE 2 MG/1
2 CAPSULE ORAL 4 TIMES DAILY PRN
Status: DISCONTINUED | OUTPATIENT
Start: 2018-08-24 | End: 2018-09-01

## 2018-08-24 RX ORDER — SODIUM CHLORIDE, SODIUM LACTATE, POTASSIUM CHLORIDE, CALCIUM CHLORIDE 600; 310; 30; 20 MG/100ML; MG/100ML; MG/100ML; MG/100ML
INJECTION, SOLUTION INTRAVENOUS CONTINUOUS
Status: ACTIVE | OUTPATIENT
Start: 2018-08-24 | End: 2018-08-25

## 2018-08-24 RX ORDER — IPRATROPIUM BROMIDE AND ALBUTEROL SULFATE 2.5; .5 MG/3ML; MG/3ML
3 SOLUTION RESPIRATORY (INHALATION) EVERY 6 HOURS PRN
Status: DISCONTINUED | OUTPATIENT
Start: 2018-08-24 | End: 2018-08-29

## 2018-08-24 RX ORDER — SODIUM CHLORIDE 9 MG/ML
INJECTION, SOLUTION INTRAVENOUS CONTINUOUS
Status: DISCONTINUED | OUTPATIENT
Start: 2018-08-24 | End: 2018-08-24

## 2018-08-24 RX ORDER — VANCOMYCIN HCL IN 5 % DEXTROSE 1G/250ML
15 PLASTIC BAG, INJECTION (ML) INTRAVENOUS
Status: DISCONTINUED | OUTPATIENT
Start: 2018-08-24 | End: 2018-08-25

## 2018-08-24 RX ORDER — CLONIDINE HYDROCHLORIDE 0.1 MG/1
0.1 TABLET ORAL 2 TIMES DAILY
Status: DISCONTINUED | OUTPATIENT
Start: 2018-08-24 | End: 2018-08-27

## 2018-08-24 RX ORDER — TOBRAMYCIN AND DEXAMETHASONE 3; 1 MG/ML; MG/ML
1 SUSPENSION/ DROPS OPHTHALMIC
Status: DISCONTINUED | OUTPATIENT
Start: 2018-08-24 | End: 2018-09-05 | Stop reason: HOSPADM

## 2018-08-24 RX ADMIN — DICLOFENAC: 10 GEL TOPICAL at 08:08

## 2018-08-24 RX ADMIN — SODIUM CHLORIDE 2430 ML: 0.9 INJECTION, SOLUTION INTRAVENOUS at 12:08

## 2018-08-24 RX ADMIN — IPRATROPIUM BROMIDE AND ALBUTEROL SULFATE 3 ML: .5; 3 SOLUTION RESPIRATORY (INHALATION) at 07:08

## 2018-08-24 RX ADMIN — INSULIN ASPART 4 UNITS: 100 INJECTION, SOLUTION INTRAVENOUS; SUBCUTANEOUS at 12:08

## 2018-08-24 RX ADMIN — CLOPIDOGREL BISULFATE 75 MG: 75 TABLET ORAL at 08:08

## 2018-08-24 RX ADMIN — INSULIN ASPART 4 UNITS: 100 INJECTION, SOLUTION INTRAVENOUS; SUBCUTANEOUS at 04:08

## 2018-08-24 RX ADMIN — IOHEXOL 100 ML: 350 INJECTION, SOLUTION INTRAVENOUS at 03:08

## 2018-08-24 RX ADMIN — IPRATROPIUM BROMIDE AND ALBUTEROL SULFATE 3 ML: .5; 3 SOLUTION RESPIRATORY (INHALATION) at 11:08

## 2018-08-24 RX ADMIN — Medication 3 ML: at 02:08

## 2018-08-24 RX ADMIN — LIDOCAINE 1 PATCH: 50 PATCH CUTANEOUS at 12:08

## 2018-08-24 RX ADMIN — ATORVASTATIN CALCIUM 40 MG: 40 TABLET, FILM COATED ORAL at 08:08

## 2018-08-24 RX ADMIN — ENOXAPARIN SODIUM 40 MG: 100 INJECTION SUBCUTANEOUS at 08:08

## 2018-08-24 RX ADMIN — HYDROCODONE BITARTRATE AND ACETAMINOPHEN 1 TABLET: 5; 325 TABLET ORAL at 12:08

## 2018-08-24 RX ADMIN — ASPIRIN 81 MG: 81 TABLET, COATED ORAL at 08:08

## 2018-08-24 RX ADMIN — PIPERACILLIN AND TAZOBACTAM 4.5 G: 4; .5 INJECTION, POWDER, LYOPHILIZED, FOR SOLUTION INTRAVENOUS; PARENTERAL at 10:08

## 2018-08-24 RX ADMIN — IPRATROPIUM BROMIDE AND ALBUTEROL SULFATE 3 ML: .5; 3 SOLUTION RESPIRATORY (INHALATION) at 03:08

## 2018-08-24 RX ADMIN — TOBRAMYCIN AND DEXAMETHASONE 1 DROP: 3; 1 SUSPENSION/ DROPS OPHTHALMIC at 05:08

## 2018-08-24 RX ADMIN — ACETAMINOPHEN 650 MG: 325 TABLET, FILM COATED ORAL at 12:08

## 2018-08-24 RX ADMIN — ONDANSETRON 8 MG: 8 TABLET, ORALLY DISINTEGRATING ORAL at 07:08

## 2018-08-24 RX ADMIN — PIPERACILLIN AND TAZOBACTAM 4.5 G: 4; .5 INJECTION, POWDER, LYOPHILIZED, FOR SOLUTION INTRAVENOUS; PARENTERAL at 05:08

## 2018-08-24 RX ADMIN — DILTIAZEM HYDROCHLORIDE 180 MG: 180 CAPSULE, EXTENDED RELEASE ORAL at 09:08

## 2018-08-24 RX ADMIN — VANCOMYCIN HYDROCHLORIDE 1000 MG: 1 INJECTION, POWDER, LYOPHILIZED, FOR SOLUTION INTRAVENOUS at 12:08

## 2018-08-24 RX ADMIN — TOBRAMYCIN AND DEXAMETHASONE 1 DROP: 3; 1 SUSPENSION/ DROPS OPHTHALMIC at 04:08

## 2018-08-24 RX ADMIN — FUROSEMIDE 40 MG: 40 TABLET ORAL at 08:08

## 2018-08-24 RX ADMIN — SODIUM CHLORIDE, SODIUM LACTATE, POTASSIUM CHLORIDE, AND CALCIUM CHLORIDE: .6; .31; .03; .02 INJECTION, SOLUTION INTRAVENOUS at 04:08

## 2018-08-24 RX ADMIN — POTASSIUM CHLORIDE 20 MEQ: 20 SOLUTION ORAL at 06:08

## 2018-08-24 RX ADMIN — Medication 3 ML: at 06:08

## 2018-08-24 RX ADMIN — MAGNESIUM SULFATE HEPTAHYDRATE 1 G: 500 INJECTION, SOLUTION INTRAMUSCULAR; INTRAVENOUS at 09:08

## 2018-08-24 RX ADMIN — IPRATROPIUM BROMIDE AND ALBUTEROL SULFATE 3 ML: .5; 3 SOLUTION RESPIRATORY (INHALATION) at 08:08

## 2018-08-24 RX ADMIN — INSULIN ASPART 1 UNITS: 100 INJECTION, SOLUTION INTRAVENOUS; SUBCUTANEOUS at 10:08

## 2018-08-24 RX ADMIN — ACETAMINOPHEN 650 MG: 325 TABLET, FILM COATED ORAL at 10:08

## 2018-08-24 NOTE — PROGRESS NOTES
.Pharmacy New Medication Education    Patient accepted medication education.    Pharmacy educated patient on name and purpose of medications and possible side effects, using the teach-back method.     Current Inpatient Medication Orders   acetaminophen tablet 650 mg   albuterol inhaler 2 puff   albuterol-ipratropium 2.5 mg-0.5 mg/3 mL nebulizer solution 3 mL   aspirin EC tablet 81 mg   atorvastatin tablet 40 mg   azithromycin 500 mg in dextrose 5 % 250 mL IVPB (ready to mix system)   cefTRIAXone (ROCEPHIN) 1 g in dextrose 5 % 50 mL IVPB   clopidogrel tablet 75 mg   dextrose 50% injection 12.5 g   diclofenac sodium 1 % gel   diltiaZEM 24 hr capsule 180 mg   enoxaparin injection 40 mg   furosemide tablet 40 mg   glucagon (human recombinant) injection 1 mg   insulin aspart U-100 pen 1-10 Units   labetalol injection 10 mg   lidocaine 5 % patch 1 patch   magnesium sulfate 1 g in dextrose 5 % 50 mL IVPB   ondansetron disintegrating tablet 8 mg   sodium chloride 0.9% flush 3 mL       Learners of pharmacy medication education included:  Patient    Patient +/- learner response:  Verbalized Understanding, Teachback

## 2018-08-24 NOTE — PROGRESS NOTES
"Vancomycin Dosing and Monitoring Pharmacy Protocol    Sheryl Martines is a 63 y.o. female    Height: 5' 1" (1.549 m)   Wt Readings from Last 3 Encounters:   08/23/18 80.9 kg (178 lb 6 oz)   08/14/18 81.6 kg (180 lb)     Ideal body weight: 47.8 kg (105 lb 6.1 oz)  Adjusted ideal body weight: 61 kg (134 lb 9.2 oz)    Temp Readings from Last 3 Encounters:   08/24/18 98.1 °F (36.7 °C) (Oral)   08/14/18 97.1 °F (36.2 °C) (Oral)      Lab Results   Component Value Date/Time    WBC 17.16 (H) 08/24/2018 04:52 AM    WBC 10.48 08/22/2018 09:48 PM      Lab Results   Component Value Date/Time    CREATININE 1.3 08/24/2018 04:52 AM    CREATININE 1.8 (H) 08/23/2018 04:20 AM    CREATININE 2.5 (H) 08/22/2018 09:48 PM      Lab Results   Component Value Date/Time    LACTATE 1.1 08/23/2018 08:51 PM       Serum creatinine: 1.3 mg/dL 08/24/18 0452  Estimated creatinine clearance: 42.7 mL/min    Antibiotics (From admission, onward)      Start     Stop Route Frequency Ordered    08/24/18 1200  vancomycin in dextrose 5 % 1 gram/250 mL IVPB 1,000 mg  (Vancomycin IVPB with levels panel)      -- IV Every 24 hours (non-standard times) 08/24/18 0946    08/24/18 1000  piperacillin-tazobactam 4.5 g in dextrose 5 % 100 mL IVPB (ready to mix system)      -- IV Every 8 hours (non-standard times) 08/24/18 0858          Antifungals (From admission, onward)      None            Microbiology Results (last 7 days)       Procedure Component Value Units Date/Time    Blood culture [[id number]] Collected:  08/23/18 2041    Order Status:  Completed Specimen:  Blood Updated:  08/24/18 0545     Blood Culture, Routine No Growth to date    Blood culture [[id number]] Collected:  08/23/18 2041    Order Status:  Completed Specimen:  Blood Updated:  08/24/18 0545     Blood Culture, Routine No Growth to date    Urine culture [[id number]]     Order Status:  No result Specimen:  Urine, Catheterized             Indication/Target trough:   Broad Spec coverage "     Hemodialysis:   N/A    Dosing Weight:   AdjBW--adjusted body weight  If ABW is greater than or equal to 30% over Ideal Body Weight, AdjBW will be used to calculate vancomycin dose.    Last Vancomycin dose: N/A   Date/Time given: N/A         Vancomycin level:  No results for input(s): VANCOMYCIN-TROUGH in the last 72 hours.  No results for input(s): VANCOMYCIN, RANDOM in the last 72 hours.    Per Protocol Initial/Adjustments Dosin. Initial/Adjustment Dose: DECREASE Vancomycin will be adjusted from 1250mg q12hr to 1000mg q24hr  2. Vancomycin Trough Level will be drawn on 2018 at 1130 AM date/time    Pharmacy will continue to follow.    Please contact if you have any further questions. Thank you.    Suzanne Martino, PharmD  [phone]

## 2018-08-24 NOTE — CONSULTS
63 year old lady consulted to r/o possible temporal arteritis, having slurred speech and falls, does c/o of eye pain and sticking, denies any blurred vision or headaches  POH - s/p cataract extraction with h/o iritis and glaucoma , has been on Lotemax and Pred Forte but no glaucoma drops  PMH - HTN, DM, CVA  Exam Vasc near 20/80 OD and 20/40 OS  Ext - wnl, Pupils - wnl, EOM - full,   SLE - Conj -clear OU, Cornea - clear OU, AC - deep OU, LEns - PCIOL OU  Fundus C:D = 0. 2 no papilledema, hemorrhages, normal macula, vessels periphery  Imp/PLan  1. No evidence of temporal arteritis  2. DM - stable no retinopathy  3. Iritis, glaucoma - doing well - healthy nerves and no redness of eyes seen  4. Conjuctivitis/keratitis - Maxitrol OU qid  F/U on discharge in office to evaluate better

## 2018-08-24 NOTE — PT/OT/SLP EVAL
Physical Therapy Evaluation/Treatment    Patient Name:  Sheryl Martines   MRN:  [id number]    Recommendations:     Discharge Recommendations:  (TBD)   Discharge Equipment Recommendations: bath bench, wheelchair   Barriers to discharge: Inaccessible home and Decreased caregiver support    Assessment:     Sheryl Martines is a 63 y.o. female admitted with a medical diagnosis of Altered mental status.  She presents with the following impairments/functional limitations:  weakness, impaired endurance, gait instability, impaired functional mobilty, impaired self care skills, impaired balance, impaired cognition, decreased upper extremity function, decreased lower extremity function, impaired sensation, pain, orthopedic precautions, decreased safety awareness Performed initial PT eval/tx; d/c recs TBD pending progress; will cont with acute PT services to maximize functional independence..    Rehab Prognosis:  Fair+; patient would benefit from acute skilled PT services to address these deficits and reach maximum level of function.      Recent Surgery: * No surgery found *      Plan:     During this hospitalization, patient to be seen 6 x/week to address the above listed problems via gait training, therapeutic activities, therapeutic exercises, neuromuscular re-education  · Plan of Care Expires:  09/23/18   Plan of Care Reviewed with: patient, daughter    Subjective     Communicated with nurse prior to session.  Patient found supine with HOB elevated upon PT entry to room, agreeable to evaluation.      Chief Complaint: pain  Patient comments/goals: to return to PLOF; pt fell Monday after waking up to go to the bathroom-  pt did not put the light on and after she took a few steps, she became dizzy/lightheaded  Pain/Comfort:  · Pain Rating 1: 10/10  · Location - Side 1: Bilateral  · Location - Orientation 1: lower  · Location 1: back  · Pain Addressed 1: Reposition, Nurse notified, Distraction, Cessation of Activity(lidocaine  patch to back)  · Pain Rating Post-Intervention 1: 10/10    Patients cultural, spiritual, Anabaptism conflicts given the current situation: n/a    Living Environment:  Pt lives with dtr in [facility] with one big step up into the [facility]; tub/shower combo-pt unable to get into the tub-pt's tub chair does not fit in the tub  Prior to admission, patients level of function was (I) with ADLs, amb with rollator and takes care of her 3 y/o grandson while dtr is at work.  Patient has the following equipment: 3-in-1 commode, rollator.   Upon discharge, patient will have limited assistance from dtr.    Objective:     Patient found with: telemetry     General Precautions: Standard, fall   Orthopedic Precautions:spinal precautions(compression fx L1-L5)   Braces: N/A     Exams:  · Cognitive Exam:  Patient is oriented to Person, Place, Time and Situation  · Gross Motor Coordination:  impaired RLE 2/2 weakness  · Sensation:    · -       Intact; pt also c/o tingling leg/foot  · RLE ROM: WFL  · RLE Strength: 2- to 3+ grossly  · LLE ROM: WFL passively  · LLE Strength: 3+ to 4- grossly   · LE strength affected by the LBP    Functional Mobility:  · Bed Mobility:     · Rolling Left:  contact guard assistance  · Rolling Right: contact guard assistance  · Scooting: total assistance and of 2 persons toward HOB with drawsheet    AM-PAC 6 CLICK MOBILITY  Total Score:8       Therapeutic Activities and Exercises:   Patient pt instructed on back sparing technique for rolling L/R 2/2 c/o LBP; performed 3 trials of rolling L/R for cleansing and changing linen after being placed on bedpan for urination; scooted up to HOB with totalA x 2 with drawsheet; deferred sitting 2/2 c/o 10/10 pain level.    Patient left HOB elevated with all lines intact, call button in reach and nurse notified.    GOALS:   Multidisciplinary Problems     Physical Therapy Goals        Problem: Physical Therapy Goal    Goal Priority Disciplines Outcome Goal Variances Interventions    Physical Therapy Goal     PT, PT/OT Ongoing (interventions implemented as appropriate)     Description:  Goals to be met by: 2018     Patient will increase functional independence with mobility by performin. Supine to sit with Modified [place]  2. Sit to supine with Modified [place]  3. Rolling to Left and Right with Modified [place].  4. Sit to stand transfer with Stand-by Assistance with AD  5. Bed to chair transfer with Stand-by Assistance with AD  6. Gait  x  feet with Stand-by Assistance using AD.   7. Ascend/Descend 8 inch curb step with Contact Guard Assistance and Minimal Assistance using AD  8. Lower extremity exercise program x10 reps with supervision                      History:     Past Medical History:   Diagnosis Date    Asthma     Closed compression fracture of fourth lumbar vertebra     COPD (chronic obstructive pulmonary disease)     Coronary artery disease     Diabetes mellitus     Glaucoma     High cholesterol     Hypertension     Iritis     Pulmonary embolus     Stroke     rt sided weakness.       Past Surgical History:   Procedure Laterality Date    ABDOMINAL SURGERY      BACK SURGERY      stimulator    CATARACT EXTRACTION      HYSTERECTOMY         Clinical Decision Making:     History  Co-morbidities and personal factors that may impact the plan of care Examination  Body Structures and Functions, activity limitations and participation restrictions that may impact the plan of care Clinical Presentation   Decision Making/ Complexity Score   Co-morbidities:   [] Time since onset of injury / illness / exacerbation  [x] Status of current condition  []Patient's cognitive status and safety concerns    [x] Multiple Medical Problems (see med hx)  Personal Factors:   [] Patient's age  [] Prior Level of function   [x] Patient's home situation (environment and family support)  [] Patient's level of motivation  [] Expected progression of  patient      HISTORY:(criteria)    [] 50610 - no personal factors/history    [] 34942 - has 1-2 personal factor/comorbidity     [x] 53629 - has >3 personal factor/comorbidity     Body Regions:  [x] Objective examination findings  [] Head     []  Neck  [] Trunk   [] Upper Extremity  [] Lower Extremity    Body Systems:  [x] For communication ability, affect, cognition, language, and learning style: the assessment of the ability to make needs known, consciousness, orientation (person, place, and time), expected emotional /behavioral responses, and learning preferences (eg, learning barriers, education  needs)  [x] For the neuromuscular system: a general assessment of gross coordinated movement (eg, balance, gait, locomotion, transfers, and transitions) and motor function  (motor control and motor learning)  [x] For the musculoskeletal system: the assessment of gross symmetry, gross range of motion, gross strength, height, and weight  [x] For the integumentary system: the assessment of pliability(texture), presence of scar formation, skin color, and skin integrity  [] For cardiovascular/pulmonary system: the assessment of heart rate, respiratory rate, blood pressure, and edema     Activity limitations:    [] Patient's cognitive status and saf ety concerns          [x] Status of current condition      [] Weight bearing restriction  [] Cardiopulmunary Restriction    Participation Restrictions:   [] Goals and goal agreement with the patient     [] Rehab potential (prognosis) and probable outcome      Examination of Body System: (criteria)    [] 47873 - addressing 1-2 elements    [] 87262 - addressing a total of 3 or more elements     [x] 71334 -  Addressing a total of 4 or more elements         Clinical Presentation: (criteria)  Stable - 64761     On examination of body system using standardized tests and measures patient presents with 4 or more elements from any of the following: body structures and functions, activity  limitations, and/or participation restrictions.  Leading to a clinical presentation that is considered stable and/or uncomplicated                              Clinical Decision Making  (Eval Complexity):  Low- 43375     Time Tracking:     PT Received On: 08/23/18  PT Start Time: 1504     PT Stop Time: 1542  PT Total Time (min): 38 min with OT    Billable Minutes: Evaluation 15 minutes and Therapeutic Activity 8 minutes      Rosa Richards, PT  08/23/2018

## 2018-08-24 NOTE — PLAN OF CARE
Problem: Physical Therapy Goal  Goal: Physical Therapy Goal  Goals to be met by: 2018     Patient will increase functional independence with mobility by performin. Supine to sit with Modified [place]  2. Sit to supine with Modified [place]  3. Rolling to Left and Right with Modified [place].  4. Sit to stand transfer with Stand-by Assistance with AD  5. Bed to chair transfer with Stand-by Assistance with AD  6. Gait  x  feet with Stand-by Assistance using AD.   7. Ascend/Descend 8 inch curb step with Contact Guard Assistance and Minimal Assistance using AD  8. Lower extremity exercise program x10 reps with supervision     Outcome: Ongoing (interventions implemented as appropriate)  Pt would benefit from PT to progress functional mobility.

## 2018-08-24 NOTE — PLAN OF CARE
Plan of care reviewed with patient. Patient verbalized complete understanding. Fall precautions maintained. Bed in lowest position, locked, call light within reach and bed alarm is on. Side rails up x's 2 with slip resistant socks on. Neuro checks, accuchecks, and I/os performed. Port-o-cath assessed. Patient encouraged to shift weight through out the night and educated on her risk for bed ulcers. PRN pain meds given.Nurse instructed patient to notify staff for any assistance and the patient verbalized complete understanding. Patient on telemetry throughout shift with no ectopy noted. Will continue to monitor.

## 2018-08-24 NOTE — PLAN OF CARE
Problem: Patient Care Overview  Goal: Plan of Care Review  Outcome: Ongoing (interventions implemented as appropriate)  Plan of care reviewed with patient. Patient verbalized complete understanding. At risk for skin issues r/t limited mobility. Turn q2 hours and prn. Dx of diabetes. Fall precautions maintained. Bed in lowest position, locked, call light within reach, and bed alarm on. Side rails up x2 with slip resistant socks on. Therapy as ordered Nurse instructed patient to notify staff for any assistance and patient verbalized complete understanding. Patient on telemetry throughout shift with no ectopy noted. will continue to monitor

## 2018-08-24 NOTE — PROGRESS NOTES
Pharmacist Renal Dose Adjustment Note    Sheryl Martines is a 63 y.o. female being treated with the medication ZOSYN  Patient Data:    Vital Signs (Most Recent):  Temp: 98.1 °F (36.7 °C) (08/24/18 0735)  Pulse: (!) 115 (08/24/18 0855)  Resp: (!) 26 (08/24/18 0855)  BP: (!) 97/59 (08/24/18 0735)  SpO2: (!) 94 % (08/24/18 0855)   Vital Signs (72h Range):  Temp:  [98 °F (36.7 °C)-101.7 °F (38.7 °C)]   Pulse:  []   Resp:  [14-26]   BP: ()/(45-88)   SpO2:  [88 %-100 %]      Recent Labs   Lab  08/22/18   2148  08/23/18   0420  08/24/18   0452   CREATININE  2.5*  1.8*  1.3     Serum creatinine: 1.3 mg/dL 08/24/18 0452  Estimated creatinine clearance: 42.7 mL/min    Medication: Zosyn dose: 4.5 gm  frequency Q 12 Hrs will be changed to medication: Zosyn 4.5 gm every 8 hours Extended infusion  Pharmacist's Name: Suzanne Martino  Pharmacist's Extension: [phone]

## 2018-08-24 NOTE — PLAN OF CARE
Problem: Physical Therapy Goal  Goal: Physical Therapy Goal  Goals to be met by: 2018     Patient will increase functional independence with mobility by performin. Supine to sit with Modified [place]  2. Sit to supine with Modified [place]  3. Rolling to Left and Right with Modified [place].  4. Sit to stand transfer with Stand-by Assistance with AD  5. Bed to chair transfer with Stand-by Assistance with AD  6. Gait  x  feet with Stand-by Assistance using AD.   7. Ascend/Descend 8 inch curb step with Contact Guard Assistance and Minimal Assistance using AD  8. Lower extremity exercise program x10 reps with supervision    Outcome: Ongoing (interventions implemented as appropriate)  Performed initial PT eval/tx; d/c recs TBD pending progress; will cont with acute PT services to maximize functional independence.

## 2018-08-24 NOTE — PLAN OF CARE
Patient had fever last night and elevated ESR and CRP with back pain. Patient does have compression fracture, but I ordered a CT of the back to assess for an epidural abscess. An MRI is contraindicated since the patient has a spinal stimulator. Neurology also wanted a repeat CT of the head. The patient did not have headaches or vision changes and thus temporal arteritis is lower on my differential.    Vish Wilkes MD  [facility]   08/24/2018

## 2018-08-24 NOTE — PT/OT/SLP PROGRESS
Occupational Therapy   Treatment    Name: Sheryl Martines  MRN: [id number]  Admitting Diagnosis:  Altered mental status       Recommendations:     Discharge Recommendations: (TBD)  Discharge Equipment Recommendations:  bath bench, wheelchair  Barriers to discharge:  Inaccessible home environment, Decreased caregiver support    Subjective     Communicated with: nurse prior to session.  Pain/Comfort:  · Pain Rating 1: 8/10  · Location - Side 1: Bilateral  · Location - Orientation 1: lower  · Location 1: back  · Pain Addressed 1: Reposition, Nurse notified, Cessation of Activity, Distraction  · Pain Rating Post-Intervention 1: 7/10    Patients cultural, spiritual, Jainism conflicts given the current situation: na    Objective:     Patient found with: bed alarm, telemetry, peripheral IV    General Precautions: Standard, fall   Orthopedic Precautions:spinal precautions   Braces: N/A     Occupational Performance:    Bed Mobility:    · Patient completed Rolling/Turning to Left with  contact guard assistance, with side rail and slow moving and guarded in pan; increased time  · Patient completed Rolling/Turning to Right with contact guard assistance, with side rail and same as above  · Patient completed Scooting/Bridging with moderate assistance  · Patient completed Supine to Sit with maximal assistance  · Patient completed Sit to Supine with moderate assistance     Functional Mobility/Transfers:  · Patient completed Sit <> Stand Transfer with unable 2/2 pain  with  NA   · Functional Mobility: NA 2/2 pain    Activities of Daily Living:  · Feeding:  supervision to increase oral intake; slight shakines with hand to mouth.  HOB elevated  · Grooming: minimum assistance to wash face thoroughly seated EOB; pain in back and to return to supine to complete grooming task; brushed teeth with SBA HOB elevated.    Patient left HOB elevated with all lines intact, call button in reach, bed alarm on, nurse notified and nurse  present    [facility] 6 Click:  [facility] Total Score: 13    Treatment & Education:  Sitting endurance 6 min max tolerance with SBA 2/2 back pain; nurse arrived with pain meds after session.  Pt off loading to R side 2/2 pain in lower spine with upright unsupported siting.  Education:    Assessment:     Sheryl Martines is a 63 y.o. female with a medical diagnosis of Altered mental status.  She presents with limited OOB transfer 2/2 back pain  Continue with OT POC..  Performance deficits affecting function are weakness, impaired functional mobilty, impaired balance, decreased lower extremity function, decreased coordination, gait instability, impaired self care skills, impaired endurance, pain, decreased ROM, impaired coordination, decreased safety awareness, impaired cognition.      Rehab Prognosis:  good; patient would benefit from acute skilled OT services to address these deficits and reach maximum level of function.       Plan:     Patient to be seen 5 x/week to address the above listed problems via self-care/home management, therapeutic exercises, therapeutic activities  · Plan of Care Expires: 09/23/18  · Plan of Care Reviewed with: patient    This Plan of care has been discussed with the patient who was involved in its development and understands and is in agreement with the identified goals and treatment plan    GOALS:   Multidisciplinary Problems     Occupational Therapy Goals        Problem: Occupational Therapy Goal    Goal Priority Disciplines Outcome Interventions   Occupational Therapy Goal     OT, PT/OT Ongoing (interventions implemented as appropriate)    Description:  Goals to be met by: 8/31/2018    Patient will increase functional independence with ADLs by performing:    Feeding with Modified [place].  UE Dressing with Set-up Assistance.  LE Dressing with Minimal Assistance.  Grooming while seated with Set-up Assistance.  Toileting from bedside commode with Minimal Assistance for hygiene and clothing  management.   Sitting at edge of bed x20 minutes with Supervision.  Rolling to Bilateral with Modified [place].   Supine to sit with Modified [place].  Stand pivot transfers with Stand-by Assistance.  Step transfer with Supervision and Stand-by Assistance  Toilet transfer to bedside commode with Stand-by Assistance.  Increased functional strength to WFL for BUE.  Upper extremity exercise program 10 reps per handout, with supervision.                      Time Tracking:     OT Date of Treatment: 08/24/18  OT Start Time: 1225  OT Stop Time: 1249  OT Total Time (min): 24 min    Billable Minutes:Self Care/Home Management 15  Therapeutic Activity 9  Total Time 24    Denise Crabtree OT  8/24/2018

## 2018-08-24 NOTE — PROGRESS NOTES
[facility] Progress Note    Subjective -  Pt was febrile overnight with a Tmax of 101.7 F. Her mental status is back to baseline per her daughter and her speech is back to normal as well. She has no signs of confusion at this time.    Review of Systems -  GENERAL: No fever. No chills. No fatigue. Denies weight loss. Denies weight gain.  HEENT: Denies headaches. Denies vision change. Denies eye pain. Denies double vision. Denies ear pain.   CV: No chest pain.  CHEST: Denies of shortness of breath.  GI: Denies constipation. No diarrhea. No abdominal pain. Denies nausea. Denies vomiting. No blood in stool.  HEME: Denies bleeding problems.  : Denies urgency. No painful urination. No blood in urine.  MS: + back pain  SKIN: Denies rash.   NEURO: Denies seizures. No weakness.  PSYCH:  Denies difficulty sleeping. No anxiety. Denies depression. No suicidal thoughts.     Allergies - ACE inhibitors, corticosteroids, hydralazine analogs, tetracyclines, travatan    Vitals -     Vitals:    08/24/18 1145 08/24/18 1224 08/24/18 1541 08/24/18 1543   BP:   (!) 124/59    BP Location:   Left arm    Patient Position:   Lying    Pulse: 86 97 90 83   Resp: 20  16 20   Temp:   98.4 °F (36.9 °C)    TempSrc:   Axillary    SpO2: (!) 93%   (!) 90%   Weight:       Height:           Neurological Exam -     Orientation to time & place:    Oriented to self, place, and situation  At this time she has no encephalopathy and per her daughter her personality is back and she is herself again.    Speech:  No aphasia, no dysarthria     Cranial nerves: visual fields full to confrontation, PERRL, EOMI, facial sensation and expression symmetic, hearing grossly intact, palate raises midline, shoulder shrug strength normal, tongue protrudes midline.      Musculoskeletal:          Muscle strength:  5/5 in bilateral upper extremities. No pronator drift  She is lying in the L lateral decubitus position. Exam of the bilateral lower extremities deferred due to  back pain    Sensation: Intact to light touch, pin prick, vibration in all extremities      Deep tendon Reflexes: 2+ bilateral triceps, biceps, and brachioradialis; 2+ in bilateral patellae. Positive ankle jerks. Plantar flexor responses bilaterally    Cerebellar and Coordination:  Gait:  Deferred due to back pain       Finger-nose: no dysmetria       Rapid Alternating Movements (pronation/supination):  R normal; L normal    Labs -    Recent Labs   Lab  08/22/18   2148  08/23/18   0420  08/23/18   1930  08/24/18   0452  08/24/18   1206   WBC  10.48   --    --   17.16*   --    HGB  9.4*   --    --   9.7*   --    HCT  32.4*   --    --   33.1*   --    PLT  246   --    --   276   --    NA  141  140   --   137  135*   K  3.7  3.5   --   3.2*  3.5   CL  98  98   --   93*  91*   CO2  34*  34*   --   31*  32*   BUN  26*  25*   --   16  20   CREATININE  2.5*  1.8*   --   1.3  1.7*   GLU  235*  146*   --   183*  238*   PROT  6.9  6.6   --   6.5   --    ALBUMIN  2.8*  2.6*   --   2.5*   --    BILITOT  0.4  0.4   --   0.8   --    AST  23  19   --   17   --    ALKPHOS  93  97   --   105   --    ALT  19  18   --   13   --    INR   --   0.9   --    --    --    TROPONINI  0.273*  0.267*  0.233*   --    --    TSH  0.902   --    --    --    --    [id number]   --   1059*   --    --    --    FOLATE   --   11.0   --    --    --        Imaging -   CT Head w/o contrast (8/22/18)  Punctate foci of parenchymal hypoattenuation noted within the bilateral thalami concerning for age indeterminate lacunar infarcts, presumably old.  No CT findings to suggest an acute major vascular distribution infarct.  No intra or extra-axial hemorrhage.  No midline shift or mass effect.  No hydrocephalus.  Sellar region is unremarkable.    Paranasal sinuses and mastoid air cells are clear.  There is heterogeneity of the calvarium, possibly related to an underlying metabolic disorder.  Subcutaneous soft tissues are normal.    Impression:  1. Apparent punctate  foci of parenchymal hypoattenuation within the bilateral thalami concerning for age indeterminate lacunar infarcts, presumably old.  Clinical correlation is recommended.    Bilateral Carotid U/S (8/23/18)  Results: the right common carotid artery is patent. The peak systolic velocities approximately 47 cm/sec which is within normal limits.  . The peak systolic velocities of the right proximal, mid and distal internal carotid artery are approximately 47, 78, and 178 cm/sec respectively this corresponds to an ICA/CCA ratio of approximately 3.7 which is within normal limits.  Please note evaluation limited by body habitus with difficulty penetrating within limits of the study increased velocity the concerning for 50-69% ICA stenosis.    The right vertebral artery is antegrade.    The left common carotid artery is patent. The peak systolic velocity is approximately 45 cm/sec which is within normal limits. Peak systolic velocity of the proximal, mid and distal left internal carotid arteries is approximately 38, 49, and 49 cm/sec respectively. This corresponds to an ICA/CCA ratio of approximately 1.1 which is within normal limits.    The left vertebral artery is antegrade.    Impression:  Limited study by body habitus.  Allowing for limitation, 50-69% right ICA stenosis with less than 50% left ICA stenosis.  Antegrade vertebral arteries bilaterally..      CTA Head/Neck (8/24/18)  FINDINGS:  CT head with and  without contrast: Study limited by patient motion.  There is continued mild age-appropriate generalized cerebral volume loss.  Ventricles stable without hydrocephalus.  Subtle subcentimeter hypodensities in the medial thalami again seen concerning for lacunar type infarcts which may be remote.  There is no abnormal parenchymal enhancement.  No midline shift or mass effect.  Visualized paranasal sinuses and mastoid air cells are clear.    CTA head:    Anterior circulation: The bilateral distal cervical, petrous,  cavernous, and supraclinoid segments of the ICAs are patent without significant focal stenosis or aneurysm.  Heavy atherosclerotic plaquing cavernous and supraclinoid ICAs without significant stenosis.    The anterior and middle cerebral arteries are patent without focal stenosis or aneurysm.    Posterior circulation: The distal left vertebral artery is dominant.  The distal vertebral arteries, basilar artery and posterior cerebral arteries are patent without proximal stenosis or occlusion.    CTA neck: There is mild scattered atherosclerotic plaquing of the origin of the great vessels as well as the arch.  Common origin the right brachiocephalic and left common carotid artery and origin of the left subclavian artery from the arch are within normal limits.    The origin of the vertebral arteries from the respective subclavian arteries are within normal limits.  The right vertebral artery is tortuous proximally and hypoplastic compared to the left.  No evidence for focal significant vertebral artery stenosis or occlusion..    Right carotid: The right common carotid artery, carotid bifurcation and extracranial portions of the internal carotid artery are patent without significant focal stenosis.    Left carotid: The left common carotid artery, carotid bifurcation and extracranial portions of the internal carotid arteries are patent without significant focal stenosis.    There is less than 50% stenosis of the proximal ICAs by NASCET criteria.    There is medialization the carotid arteries in the retropharyngeal soft tissues BILATERALLY    Pharynx/larynx: Evaluation limited by scatter artifact from dental metal and motion allowing for limitation the nasopharynx, oropharynx, are within normal limits allowing for posterior impression.  Approximation of the hypopharynx and partial approximation of the larynx likely related to breath hold during the scan..    Oral cavity and the buccal space      limited by dental  metal.    Glands: Nonspecific enlargement of the parotid and submandibular glands bilaterally with heterogeneous hypoattenuation.  The thyroid gland is within normal limits.    No evidence for adenopathy throughout the neck by size criteria.    Multilevel degenerative change of the cervical spine without evidence for acute fracture or subluxation.  No consolidation in the visualized lungs.  Remote operative change from bilateral cataract repair.    Impression  CTA head: Unremarkable CTA of the head specifically without evidence for proximal significant stenosis or occlusion.    CTA neck: No significant arterial stenosis throughout the neck..    CT head: No significant change from prior.  Allowing for motion limitation there is no evidence for acute intracranial hemorrhage or new abnormal parenchymal attenuation.  Subcentimeter hypodensities medial thalami bilaterally suggestive for lacunar type infarcts which may be remote overall age indeterminate.        EEG (8/23/18)  IMPRESSION:  This is an abnormal EEG during wakefulness, drowsiness and sleep. Diffuse disorganized slowing in the background was noted.  Generalized synchronous rhythmical delta activity was noted.  Infrequent triphasic waves were noted.     Assessment and Plan -   Ms. Martines is a 62 y/o F w/ a history of HTN, Type 2 DM, CAD, and hx of CVA who presented by EMS with family concerned for opiate intoxication vs CVA due to symptoms of AMS and slurred speech. EEG demonstrates triphasic waves concerning for metabolic encephalopathy. No epileptiform activity noted. Ms. Martines is currently taking DAPT as an outpatient. Ms. Martines was evaluated by ophthalmology who ruled out temporal arteritis.    Altered mental status:  -Encephalopathy has resolved  -Attributed to sepsis vs opiate overuse  -Ms. Martines had a Tmax of 101.7 F overnight and is being treated with broad spectrum antibiotics.  -However, given her history of stroke risk factors and  previous infarction, a stroke will be must be ruled out.  -She is unable to have an MRI due to the spinal stimulator.  -CT Head: bilateral lacunar infarcts  -Carotid U/S demonstrates Right ICA and Left ICA stenosis.  -LDL: 86.6  -HgbA1c: 8.1  Recommendations:  -Resume blood pressure control. Recommend not dropping the SBP by more than 15% in 24 hrs.   -Cont DAPT and atorvastatin  -Stroke risk factor modification per the primary team  -Avoid any sedating medications.  -Treatment of sepsis per primary team.    Will sign off at this time. If there are any questions/concerns regarding the above consult/recommendations, please contact the on-call [facility] resident.    Servando Silver MD  [facility] PGY 4

## 2018-08-24 NOTE — PLAN OF CARE
Problem: Patient Care Overview  Goal: Plan of Care Review  Outcome: Ongoing (interventions implemented as appropriate)  Pt on RA with documented sats.  The proper method of use, as well as anticipated side effects, of this aerosol treatment are discussed and demonstrated to the patient.

## 2018-08-24 NOTE — PT/OT/SLP PROGRESS
Physical Therapy Treatment    Patient Name:  Sheryl Martines   MRN:  [id number]    Recommendations:     Discharge Recommendations:   TBD   Discharge Equipment Recommendations: bath bench, wheelchair   Barriers to discharge: TBD    Assessment:     Sheryl Martines is a 63 y.o. female admitted with a medical diagnosis of Altered mental status.  She presents with the following impairments/functional limitations:  weakness, impaired functional mobilty, impaired balance, impaired endurance, impaired self care skills, gait instability, decreased lower extremity function, impaired cognition, decreased upper extremity function, impaired cardiopulmonary response to activity.    Rehab Prognosis:  Fair; patient would benefit from acute skilled PT services to address these deficits and reach maximum level of function.      Recent Surgery: * No surgery found *      Plan:     During this hospitalization, patient to be seen 6 x/week to address the above listed problems via gait training, therapeutic activities, therapeutic exercises, neuromuscular re-education, wheelchair management/training  · Plan of Care Expires:  09/23/18   Plan of Care Reviewed with: patient, daughter    Subjective     Communicated with nurse prior to session.  Patient found supine with nursing aide in room upon PT entry to room, agreeable to treatment.      Chief Complaint: low back pain with attempting sit to stand  Patient comments/goals: none stated  Pain/Comfort:  · Pain Rating 1: 9/10  · Location - Orientation 1: (low back)  · Pain Addressed 1: Pre-medicate for activity, Distraction, Reposition, Cessation of Activity  · Pain Rating Post-Intervention 1: 9/10    Patients cultural, spiritual, Sabianist conflicts given the current situation: n/a    Objective:     Patient found with:       General Precautions: Standard, fall   Orthopedic Precautions:spinal precautions(compression fx L1-L5)   Braces:       Functional Mobility:  · Bed Mobility:     · Rolling  Left:  moderate assistance  · Rolling Right: moderate assistance  · Supine to Sit: maximal assistance  · Sit to Supine: maximal assistance  · Transfers:     · Sit to Stand:  total assistance and inability to stand fully secondary to back pain with rolling walker  · Balance: Pt has F/F- static sitting balance.      AM-PAC 6 CLICK MOBILITY  Turning over in bed (including adjusting bedclothes, sheets and blankets)?: 3  Sitting down on and standing up from a chair with arms (e.g., wheelchair, bedside commode, etc.): 2  Moving from lying on back to sitting on the side of the bed?: 2  Moving to and from a bed to a chair (including a wheelchair)?: 2  Need to walk in hospital room?: 1  Climbing 3-5 steps with a railing?: 1  Basic Mobility Total Score: 11       Therapeutic Activities and Exercises:   Pt sat at EOB 8 minutes with occasional assist to remain sitting somewhat upright.    Patient left HOB elevated with all lines intact, call button in reach, bed alarm on and nursing aide present..    GOALS:   Multidisciplinary Problems     Physical Therapy Goals        Problem: Physical Therapy Goal    Goal Priority Disciplines Outcome Goal Variances Interventions   Physical Therapy Goal     PT, PT/OT Ongoing (interventions implemented as appropriate)     Description:  Goals to be met by: 2018     Patient will increase functional independence with mobility by performin. Supine to sit with Modified Barren  2. Sit to supine with Modified Barren  3. Rolling to Left and Right with Modified Barren.  4. Sit to stand transfer with Stand-by Assistance with AD  5. Bed to chair transfer with Stand-by Assistance with AD  6. Gait  x  feet with Stand-by Assistance using AD.   7. Ascend/Descend 8 inch curb step with Contact Guard Assistance and Minimal Assistance using AD  8. Lower extremity exercise program x10 reps with supervision             Problem: Physical Therapy Goal    Goal Priority Disciplines  Outcome Goal Variances Interventions   Physical Therapy Goal     PT, PT/OT                      Time Tracking:     PT Received On: 08/24/18  PT Start Time: 1002     PT Stop Time: 1032  PT Total Time (min): 30 min     Billable Minutes: Therapeutic Activity 30             PTA Visit Number: 0     Elsa Nunez, PT  08/24/2018

## 2018-08-24 NOTE — PROGRESS NOTES
"PGY-2 Progress Note [facility]  Follow up for: CVA evaluation    Chief Complaint   Patient presents with    Altered Mental Status     To ER with EMS stating that the family called for altered mental status but was awake and alert when they arrived.  pt with slurred speech and has slept alot today, taking pain medication for an injury one week ago.     Hospital Stay Day 1    Subjective: Two fevers overnight, elevated resp rate and HR this AM, some nausea this morning 2/2 oral potassium    Denies any CP, SOB, N/V/D, headaches, fever or chills.  Reports back pain likely 2/2 to recent fall     Scheduled Meds:   albuterol-ipratropium  3 mL Nebulization Q4H    aspirin  81 mg Oral Daily    atorvastatin  40 mg Oral Daily    clopidogrel  75 mg Oral Daily    diclofenac sodium   Topical (Top) Daily    diltiaZEM  180 mg Oral Daily    enoxaparin  40 mg Subcutaneous Daily    furosemide  40 mg Oral BID    lidocaine  1 patch Transdermal Q24H    magnesium sulfate IVPB  1 g Intravenous Once    piperacillin-tazobactam (ZOSYN) IVPB  4.5 g Intravenous Q8H    sodium chloride 0.9%  3 mL Intravenous Q8H    vancomycin (VANCOCIN) IVPB  15 mg/kg Intravenous Q24H     Continuous Infusions:  PRN Meds:acetaminophen, albuterol, albuterol-ipratropium, dextrose 50%, glucagon (human recombinant), insulin aspart U-100, labetalol, ondansetron    Review of patient's allergies indicates:   Allergen Reactions    Ace inhibitors Swelling    Corticosteroids (glucocorticoids)     Hydralazine analogues     Tetracyclines Swelling    Travatan (with benzalkonium) [travoprost (benzalkonium)]        Objectives:     Vitals(Most Recent)      BP  Min: 97/59  Max: 160/74  Temp  Av.6 °F (37.6 °C)  Min: 98 °F (36.7 °C)  Max: 101.7 °F (38.7 °C)  Pulse  Av.8  Min: 66  Max: 120  Resp  Av.2  Min: 16  Max: 26  SpO2  Av.6 %  Min: 88 %  Max: 100 %  Height  Av' 1" (154.9 cm)  Min: 5' 1" (154.9 cm)  Max: 5' 1" (154.9 cm)  Weight  Av.3 " kg (179 lb 2.2 oz)  Min: 80.9 kg (178 lb 6 oz)  Max: 81.6 kg (179 lb 14.3 oz)             Vitals([id number])  Temp:  [98 °F (36.7 °C)-101.7 °F (38.7 °C)]   Pulse:  []   Resp:  [16-26]   BP: ()/(59-74)   SpO2:  [88 %-100 %]     I & O(Iyiy96p)    Intake/Output Summary (Last 24 hours) at 8/24/2018 0917  Last data filed at 8/24/2018 0200  Gross per 24 hour   Intake 1675 ml   Output 800 ml   Net 875 ml     Constitutional: Patient is oriented to person, place, and time  HENT: Head: Normocephalic   Eyes: Dry skin on eyelids.  EOM are normal. Pupils are equal, round, and reactive to light. Neck: Normal range of motion. Neck supple. No JVD present. No tracheal deviation present.   Cardiovascular: Tachycardic, regular rhythm, normal heart sounds and intact distal pulses.  Exam reveals no gallop and no friction rub.  No murmur heard.  Pulmonary/Chest: Effort normal and breath sounds normal. No respiratory distress.   Abdominal: Soft. Bowel sounds are normal. Patient exhibits no distension and no mass. There is no tenderness.   Musculoskeletal: Normal range of motion. Patient exhibits no edema.  TTP of sacral region   Skin: Skin is warm and dry. No rash noted. No erythema. No pallor.   Psychiatric: unable to ascess   Neuro: patient not participating in exam, difficult to obtain    LABS  CBC  Recent Labs   Lab  08/22/18 2148 08/24/18   0452   WBC  10.48  17.16*   RBC  3.99*  4.15   HGB  9.4*  9.7*   HCT  32.4*  33.1*   PLT  246  276   MCV  81*  80*   MCH  23.6*  23.4*   MCHC  29.0*  29.3*     BMP  Recent Labs   Lab  08/22/18 2148 08/23/18   0420  08/24/18   0452   NA  141  140  137   K  3.7  3.5  3.2*   CO2  34*  34*  31*   CL  98  98  93*   BUN  26*  25*  16   CREATININE  2.5*  1.8*  1.3   GLU  235*  146*  183*     POCT-Glucose  POCT Glucose   Date Value Ref Range Status   08/24/2018 149 (H) 70 - 110 mg/dL Final   08/23/2018 193 (H) 70 - 110 mg/dL Final   08/23/2018 162 (H) 70 - 110 mg/dL Final   08/23/2018 172  (H) 70 - 110 mg/dL Final   08/23/2018 115 (H) 70 - 110 mg/dL Final   08/23/2018 241 (H) 70 - 110 mg/dL Final   08/22/2018 241 (H) 70 - 110 mg/dL Final       Recent Labs   Lab  08/22/18 2148 08/23/18 0217  08/23/18   0420  08/24/18   0452   CALCIUM  9.9   --   9.7  9.7   MG  1.7  2.1  1.6  1.2*   PHOS  4.2  3.8  3.5  3.7     LFT  Recent Labs   Lab  08/22/18 2148 08/23/18 0420  08/24/18   0452   PROT  6.9  6.6  6.5   ALBUMIN  2.8*  2.6*  2.5*   BILITOT  0.4  0.4  0.8   AST  23  19  17   ALKPHOS  93  97  105   ALT  19  18  13     COAGS  Recent Labs   Lab  08/23/18 0420   INR  0.9   APTT  29.0     CE  Recent Labs   Lab  08/22/18 2148 08/23/18 0420  08/23/18   1930   TROPONINI  0.273*  0.267*  0.233*     BNP  Recent Labs   Lab  08/23/18   0217   BNP  25     LAST HbA1c  Lab Results   Component Value Date    HGBA1C 8.1 (H) 08/23/2018     Imaging  Imaging Results          US Carotid Bilateral (Final result)  Result time 08/23/18 09:08:55    Final result by Marc Lemos DO (08/23/18 09:08:55)                 Impression:      Limited study by body habitus.    Allowing for limitation, 50-69% right ICA stenosis with less than 50% left ICA stenosis.    Antegrade vertebral arteries bilaterally..      Electronically signed by: Marc Lemos DO  Date:    08/23/2018  Time:    09:08             Narrative:    EXAMINATION:  US CAROTID BILATERAL    CLINICAL HISTORY:  stroke; Altered mental status, unspecified    TECHNIQUE:  Grayscale and color Doppler ultrasound examination of the carotid and vertebral artery systems bilaterally.    COMPARISON:  None.    FINDINGS:  Results: the right common carotid artery is patent. The peak systolic velocities approximately 47 cm/sec which is within normal limits.  . The peak systolic velocities of the right proximal, mid and distal internal carotid artery are approximately 47, 78, and 178 cm/sec respectively this corresponds to an ICA/CCA ratio of approximately 3.7 which is within  normal limits.  Please note evaluation limited by body habitus with difficulty penetrating within limits of the study increased velocity the concerning for 50-69% ICA stenosis.    The right vertebral artery is antegrade.    The left common carotid artery is patent. The peak systolic velocity is approximately 45 cm/sec which is within normal limits. Peak systolic velocity of the proximal, mid and distal left internal carotid arteries is approximately 38, 49, and 49 cm/sec respectively. This corresponds to an ICA/CCA ratio of approximately 1.1 which is within normal limits.    The left vertebral artery is antegrade.    Measurement of carotid stenosis is based on velocity parameters that correlate the residual internal carotid diameter with [place] [employer] symptomatic carotid endarterectomy trial (NASCET) - based stenosis levels.                               CT Head Without Contrast (Final result)  Result time 08/22/18 22:19:26    Final result by Darwin Cool MD (08/22/18 22:19:26)                 Impression:      1. Apparent punctate foci of parenchymal hypoattenuation within the bilateral thalami concerning for age indeterminate lacunar infarcts, presumably old.  Clinical correlation is recommended.      Electronically signed by: Darwin Cool MD  Date:    08/22/2018  Time:    22:19             Narrative:    EXAMINATION:  CT HEAD WITHOUT CONTRAST    CLINICAL HISTORY:  Confusion/delirium, altered LOC, unexplained;    TECHNIQUE:  5-mm axial images were obtained through the head without the use of contrast.  Coronal and sagittal reformats were performed.    COMPARISON:  None.    FINDINGS:  Punctate foci of parenchymal hypoattenuation noted within the bilateral thalami concerning for age indeterminate lacunar infarcts, presumably old.  No CT findings to suggest an acute major vascular distribution infarct.  No intra or extra-axial hemorrhage.  No midline shift or mass effect.  No hydrocephalus.  Sellar region is  unremarkable.    Paranasal sinuses and mastoid air cells are clear.  There is heterogeneity of the calvarium, possibly related to an underlying metabolic disorder.  Subcutaneous soft tissues are normal.                               X-Ray Chest 1 View (Final result)  Result time 08/22/18 22:21:33    Final result by Darwin Cool MD (08/22/18 22:21:33)                 Impression:      1. No acute radiographic findings in the chest on this single view.      Electronically signed by: Darwin Cool MD  Date:    08/22/2018  Time:    22:21             Narrative:    EXAMINATION:  XR CHEST 1 VIEW    CLINICAL HISTORY:  AMS;    TECHNIQUE:  Single frontal view of the chest was performed.    COMPARISON:  None    FINDINGS:  Partially visualized catheter projects to the right of midline, presumably over the SVC.  Spinal cord stimulator projects over the midline thorax.  Mediastinal structures are midline.  Cardiac silhouette is magnified by AP technique.  Lung volumes are symmetric.  No consolidation.  No pneumothorax or pleural effusions.  No free air beneath the diaphragm.  Degenerative changes of the shoulders noted.                                Micro:  Microbiology Results (last 7 days)     Procedure Component Value Units Date/Time    Blood culture [[id number]] Collected:  08/23/18 2041    Order Status:  Completed Specimen:  Blood Updated:  08/24/18 0545     Blood Culture, Routine No Growth to date    Blood culture [[id number]] Collected:  08/23/18 2041    Order Status:  Completed Specimen:  Blood Updated:  08/24/18 0545     Blood Culture, Routine No Growth to date    Urine culture [[id number]]     Order Status:  No result Specimen:  Urine, Catheterized              Assessment/Plan:    63 y.o. 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      CVA    CT head is negative for acute hemorrhagic event  Anti-platelet therapy: ASA,  continue home plavix  Atorvastatin for secondary stroke prevention   Labs: HgA1C, Lipid Panel, TSH, Vitamin B-12, B6, Thiamine, Folate, RPR- WNL  Q4 nuero checks  CTA Head and neck ordered- f/u results   PT/OT/ST evaluation  Elevate the head of the bed with aspiration precautions  Fall precautions  Drug screen pos for opiates     Carotid Doppler ordered- Allowing for limitation, 50-69% right ICA stenosis with less than 50% left ICA stenosis.  Antegrade vertebral arteries bilaterally.    2D echo with bubble study- 1 - Concentric remodeling.     2 - No wall motion abnormalities.     3 - Normal left ventricular systolic function (EF 60-65%).     4 - Impaired LV relaxation, normal LAP (grade 1 diastolic dysfunction).     5 - Normal right ventricular systolic function .     6 - The estimated PA systolic pressure is 14 mmHg.     7 - No evidence of intracardiac shunt.     EEG performed- encephalopathy     CTA head and neck ordered- f/u on read     HTN  Able to restart home meds      T2DM  - will cover with SSI     CAD  - Continue ASA and plavix     Elevated troponin- resolving   - 0.273-->0.267--> 0.233  Continue to trend and monitor    Sepsis   - Started on 30mL/kg of saline  - started on vanc and zosyn  - blood cultures obtained  - lactate obtained     PT/OT: ordered and following  Code: full  Diet: regular  Ppx: dvt: lovenox   Dispo: f/u CTA head and neck, neuro recs    Case discussed with staff    Leta Jovel MD  [facility] HO2  08/24/2018

## 2018-08-24 NOTE — PLAN OF CARE
Problem: Occupational Therapy Goal  Goal: Occupational Therapy Goal  Goals to be met by: 8/31/2018    Patient will increase functional independence with ADLs by performing:    Feeding with Modified [place].  UE Dressing with Set-up Assistance.  LE Dressing with Minimal Assistance.  Grooming while seated with Set-up Assistance.  Toileting from bedside commode with Minimal Assistance for hygiene and clothing management.   Sitting at edge of bed x20 minutes with Supervision.  Rolling to Bilateral with Modified [place].   Supine to sit with Modified [place].  Stand pivot transfers with Stand-by Assistance.  Step transfer with Supervision and Stand-by Assistance  Toilet transfer to bedside commode with Stand-by Assistance.  Increased functional strength to WFL for BUE.  Upper extremity exercise program 10 reps per handout, with supervision.     Outcome: Ongoing (interventions implemented as appropriate)  Limited OOB transfer 2/2 back pain; sat 6 min EOB with OT.  Continue with OT POC.

## 2018-08-25 LAB
ALBUMIN SERPL BCP-MCNC: 2.2 G/DL
ALP SERPL-CCNC: 108 U/L
ALT SERPL W/O P-5'-P-CCNC: 17 U/L
ANION GAP SERPL CALC-SCNC: 10 MMOL/L
AST SERPL-CCNC: 22 U/L
BASOPHILS # BLD AUTO: 0.03 K/UL
BASOPHILS # BLD AUTO: 0.03 K/UL
BASOPHILS NFR BLD: 0.2 %
BASOPHILS NFR BLD: 0.2 %
BILIRUB SERPL-MCNC: 0.7 MG/DL
BUN SERPL-MCNC: 16 MG/DL
CALCIUM SERPL-MCNC: 8.8 MG/DL
CHLORIDE SERPL-SCNC: 95 MMOL/L
CO2 SERPL-SCNC: 32 MMOL/L
CREAT SERPL-MCNC: 1.3 MG/DL
DIFFERENTIAL METHOD: ABNORMAL
DIFFERENTIAL METHOD: ABNORMAL
EOSINOPHIL # BLD AUTO: 0.4 K/UL
EOSINOPHIL # BLD AUTO: 0.4 K/UL
EOSINOPHIL NFR BLD: 2.6 %
EOSINOPHIL NFR BLD: 2.6 %
ERYTHROCYTE [DISTWIDTH] IN BLOOD BY AUTOMATED COUNT: 16.1 %
ERYTHROCYTE [DISTWIDTH] IN BLOOD BY AUTOMATED COUNT: 16.1 %
EST. GFR  (AFRICAN AMERICAN): 50 ML/MIN/1.73 M^2
EST. GFR  (NON AFRICAN AMERICAN): 44 ML/MIN/1.73 M^2
GLUCOSE SERPL-MCNC: 147 MG/DL
HCT VFR BLD AUTO: 29.2 %
HCT VFR BLD AUTO: 29.2 %
HGB BLD-MCNC: 8.6 G/DL
HGB BLD-MCNC: 8.6 G/DL
LYMPHOCYTES # BLD AUTO: 1.9 K/UL
LYMPHOCYTES # BLD AUTO: 1.9 K/UL
LYMPHOCYTES NFR BLD: 13.9 %
LYMPHOCYTES NFR BLD: 13.9 %
MAGNESIUM SERPL-MCNC: 1.2 MG/DL
MCH RBC QN AUTO: 23.3 PG
MCH RBC QN AUTO: 23.3 PG
MCHC RBC AUTO-ENTMCNC: 29.5 G/DL
MCHC RBC AUTO-ENTMCNC: 29.5 G/DL
MCV RBC AUTO: 79 FL
MCV RBC AUTO: 79 FL
MONOCYTES # BLD AUTO: 1.2 K/UL
MONOCYTES # BLD AUTO: 1.2 K/UL
MONOCYTES NFR BLD: 8.9 %
MONOCYTES NFR BLD: 8.9 %
NEUTROPHILS # BLD AUTO: 10 K/UL
NEUTROPHILS # BLD AUTO: 10 K/UL
NEUTROPHILS NFR BLD: 74.1 %
NEUTROPHILS NFR BLD: 74.1 %
PHOSPHATE SERPL-MCNC: 3.2 MG/DL
PLATELET # BLD AUTO: 257 K/UL
PLATELET # BLD AUTO: 257 K/UL
PMV BLD AUTO: 9.2 FL
PMV BLD AUTO: 9.2 FL
POCT GLUCOSE: 150 MG/DL (ref 70–110)
POCT GLUCOSE: 153 MG/DL (ref 70–110)
POCT GLUCOSE: 217 MG/DL (ref 70–110)
POCT GLUCOSE: 238 MG/DL (ref 70–110)
POTASSIUM SERPL-SCNC: 3.2 MMOL/L
PROT SERPL-MCNC: 5.8 G/DL
PTH-INTACT SERPL-MCNC: 89.2 PG/ML
RBC # BLD AUTO: 3.69 M/UL
RBC # BLD AUTO: 3.69 M/UL
SODIUM SERPL-SCNC: 137 MMOL/L
TROPONIN I SERPL DL<=0.01 NG/ML-MCNC: 0.1 NG/ML
WBC # BLD AUTO: 13.55 K/UL
WBC # BLD AUTO: 13.55 K/UL

## 2018-08-25 PROCEDURE — 25000003 PHARM REV CODE 250: Performed by: STUDENT IN AN ORGANIZED HEALTH CARE EDUCATION/TRAINING PROGRAM

## 2018-08-25 PROCEDURE — 97530 THERAPEUTIC ACTIVITIES: CPT

## 2018-08-25 PROCEDURE — 63600175 PHARM REV CODE 636 W HCPCS: Performed by: FAMILY MEDICINE

## 2018-08-25 PROCEDURE — 99900035 HC TECH TIME PER 15 MIN (STAT)

## 2018-08-25 PROCEDURE — 80053 COMPREHEN METABOLIC PANEL: CPT

## 2018-08-25 PROCEDURE — 25000242 PHARM REV CODE 250 ALT 637 W/ HCPCS: Performed by: STUDENT IN AN ORGANIZED HEALTH CARE EDUCATION/TRAINING PROGRAM

## 2018-08-25 PROCEDURE — 85025 COMPLETE CBC W/AUTO DIFF WBC: CPT

## 2018-08-25 PROCEDURE — 87086 URINE CULTURE/COLONY COUNT: CPT

## 2018-08-25 PROCEDURE — 94799 UNLISTED PULMONARY SVC/PX: CPT

## 2018-08-25 PROCEDURE — 83735 ASSAY OF MAGNESIUM: CPT

## 2018-08-25 PROCEDURE — 25000003 PHARM REV CODE 250: Performed by: FAMILY MEDICINE

## 2018-08-25 PROCEDURE — 21400001 HC TELEMETRY ROOM

## 2018-08-25 PROCEDURE — 83970 ASSAY OF PARATHORMONE: CPT

## 2018-08-25 PROCEDURE — 94761 N-INVAS EAR/PLS OXIMETRY MLT: CPT

## 2018-08-25 PROCEDURE — 94640 AIRWAY INHALATION TREATMENT: CPT

## 2018-08-25 PROCEDURE — 84484 ASSAY OF TROPONIN QUANT: CPT

## 2018-08-25 PROCEDURE — 25500020 PHARM REV CODE 255: Performed by: FAMILY MEDICINE

## 2018-08-25 PROCEDURE — S0030 INJECTION, METRONIDAZOLE: HCPCS | Performed by: STUDENT IN AN ORGANIZED HEALTH CARE EDUCATION/TRAINING PROGRAM

## 2018-08-25 PROCEDURE — A4216 STERILE WATER/SALINE, 10 ML: HCPCS | Performed by: STUDENT IN AN ORGANIZED HEALTH CARE EDUCATION/TRAINING PROGRAM

## 2018-08-25 PROCEDURE — 63600175 PHARM REV CODE 636 W HCPCS: Performed by: STUDENT IN AN ORGANIZED HEALTH CARE EDUCATION/TRAINING PROGRAM

## 2018-08-25 PROCEDURE — 84100 ASSAY OF PHOSPHORUS: CPT

## 2018-08-25 PROCEDURE — 25000003 PHARM REV CODE 250: Performed by: OPHTHALMOLOGY

## 2018-08-25 RX ORDER — VANCOMYCIN HCL IN 5 % DEXTROSE 1G/250ML
1 PLASTIC BAG, INJECTION (ML) INTRAVENOUS
Status: DISCONTINUED | OUTPATIENT
Start: 2018-08-25 | End: 2018-08-26

## 2018-08-25 RX ORDER — LIDOCAINE HYDROCHLORIDE 10 MG/ML
1 INJECTION INFILTRATION; PERINEURAL ONCE
Status: COMPLETED | OUTPATIENT
Start: 2018-08-26 | End: 2018-08-26

## 2018-08-25 RX ORDER — METRONIDAZOLE 500 MG/100ML
500 INJECTION, SOLUTION INTRAVENOUS
Status: DISCONTINUED | OUTPATIENT
Start: 2018-08-25 | End: 2018-08-27

## 2018-08-25 RX ORDER — TRAMADOL HYDROCHLORIDE 50 MG/1
50 TABLET ORAL EVERY 8 HOURS PRN
Status: DISCONTINUED | OUTPATIENT
Start: 2018-08-25 | End: 2018-09-01

## 2018-08-25 RX ADMIN — IOHEXOL 75 ML: 350 INJECTION, SOLUTION INTRAVENOUS at 04:08

## 2018-08-25 RX ADMIN — TOBRAMYCIN AND DEXAMETHASONE 1 DROP: 3; 1 SUSPENSION/ DROPS OPHTHALMIC at 06:08

## 2018-08-25 RX ADMIN — TOBRAMYCIN AND DEXAMETHASONE 1 DROP: 3; 1 SUSPENSION/ DROPS OPHTHALMIC at 10:08

## 2018-08-25 RX ADMIN — LIDOCAINE 1 PATCH: 50 PATCH CUTANEOUS at 12:08

## 2018-08-25 RX ADMIN — IPRATROPIUM BROMIDE AND ALBUTEROL SULFATE 3 ML: .5; 3 SOLUTION RESPIRATORY (INHALATION) at 11:08

## 2018-08-25 RX ADMIN — ACETAMINOPHEN 650 MG: 325 TABLET, FILM COATED ORAL at 06:08

## 2018-08-25 RX ADMIN — Medication 3 ML: at 12:08

## 2018-08-25 RX ADMIN — DILTIAZEM HYDROCHLORIDE 180 MG: 180 CAPSULE, EXTENDED RELEASE ORAL at 10:08

## 2018-08-25 RX ADMIN — ASPIRIN 81 MG: 81 TABLET, COATED ORAL at 10:08

## 2018-08-25 RX ADMIN — IPRATROPIUM BROMIDE AND ALBUTEROL SULFATE 3 ML: .5; 3 SOLUTION RESPIRATORY (INHALATION) at 08:08

## 2018-08-25 RX ADMIN — PIPERACILLIN AND TAZOBACTAM 4.5 G: 4; .5 INJECTION, POWDER, LYOPHILIZED, FOR SOLUTION INTRAVENOUS; PARENTERAL at 05:08

## 2018-08-25 RX ADMIN — INSULIN ASPART 2 UNITS: 100 INJECTION, SOLUTION INTRAVENOUS; SUBCUTANEOUS at 10:08

## 2018-08-25 RX ADMIN — METRONIDAZOLE 500 MG: 500 INJECTION, SOLUTION INTRAVENOUS at 10:08

## 2018-08-25 RX ADMIN — IPRATROPIUM BROMIDE AND ALBUTEROL SULFATE 3 ML: .5; 3 SOLUTION RESPIRATORY (INHALATION) at 02:08

## 2018-08-25 RX ADMIN — ONDANSETRON 8 MG: 8 TABLET, ORALLY DISINTEGRATING ORAL at 10:08

## 2018-08-25 RX ADMIN — PIPERACILLIN AND TAZOBACTAM 4.5 G: 4; .5 INJECTION, POWDER, LYOPHILIZED, FOR SOLUTION INTRAVENOUS; PARENTERAL at 01:08

## 2018-08-25 RX ADMIN — ATORVASTATIN CALCIUM 40 MG: 40 TABLET, FILM COATED ORAL at 10:08

## 2018-08-25 RX ADMIN — SODIUM CHLORIDE, SODIUM LACTATE, POTASSIUM CHLORIDE, AND CALCIUM CHLORIDE: .6; .31; .03; .02 INJECTION, SOLUTION INTRAVENOUS at 12:08

## 2018-08-25 RX ADMIN — TRAMADOL HYDROCHLORIDE 50 MG: 50 TABLET, COATED ORAL at 02:08

## 2018-08-25 RX ADMIN — IPRATROPIUM BROMIDE AND ALBUTEROL SULFATE 3 ML: .5; 3 SOLUTION RESPIRATORY (INHALATION) at 07:08

## 2018-08-25 RX ADMIN — FUROSEMIDE 40 MG: 40 TABLET ORAL at 10:08

## 2018-08-25 RX ADMIN — TOBRAMYCIN AND DEXAMETHASONE 1 DROP: 3; 1 SUSPENSION/ DROPS OPHTHALMIC at 12:08

## 2018-08-25 RX ADMIN — VANCOMYCIN HYDROCHLORIDE 1000 MG: 1 INJECTION, POWDER, LYOPHILIZED, FOR SOLUTION INTRAVENOUS at 02:08

## 2018-08-25 RX ADMIN — INSULIN ASPART 4 UNITS: 100 INJECTION, SOLUTION INTRAVENOUS; SUBCUTANEOUS at 06:08

## 2018-08-25 RX ADMIN — IOHEXOL 30 ML: 350 INJECTION, SOLUTION INTRAVENOUS at 06:08

## 2018-08-25 RX ADMIN — IPRATROPIUM BROMIDE AND ALBUTEROL SULFATE 3 ML: .5; 3 SOLUTION RESPIRATORY (INHALATION) at 12:08

## 2018-08-25 RX ADMIN — IPRATROPIUM BROMIDE AND ALBUTEROL SULFATE 3 ML: .5; 3 SOLUTION RESPIRATORY (INHALATION) at 04:08

## 2018-08-25 RX ADMIN — ENOXAPARIN SODIUM 40 MG: 100 INJECTION SUBCUTANEOUS at 05:08

## 2018-08-25 RX ADMIN — ONDANSETRON 8 MG: 8 TABLET, ORALLY DISINTEGRATING ORAL at 04:08

## 2018-08-25 RX ADMIN — CLONIDINE HYDROCHLORIDE 0.1 MG: 0.1 TABLET ORAL at 10:08

## 2018-08-25 RX ADMIN — TOBRAMYCIN AND DEXAMETHASONE 1 DROP: 3; 1 SUSPENSION/ DROPS OPHTHALMIC at 05:08

## 2018-08-25 RX ADMIN — TOBRAMYCIN AND DEXAMETHASONE 1 DROP: 3; 1 SUSPENSION/ DROPS OPHTHALMIC at 02:08

## 2018-08-25 RX ADMIN — DICLOFENAC: 10 GEL TOPICAL at 10:08

## 2018-08-25 RX ADMIN — Medication 3 ML: at 02:08

## 2018-08-25 RX ADMIN — CLOPIDOGREL BISULFATE 75 MG: 75 TABLET ORAL at 10:08

## 2018-08-25 RX ADMIN — PIPERACILLIN AND TAZOBACTAM 4.5 G: 4; .5 INJECTION, POWDER, LYOPHILIZED, FOR SOLUTION INTRAVENOUS; PARENTERAL at 10:08

## 2018-08-25 NOTE — PLAN OF CARE
Problem: Patient Care Overview  Goal: Plan of Care Review  Outcome: Ongoing (interventions implemented as appropriate)  Pt received on NC at 2 LPM. Pt SpO2 100%. No respiratory distress. Will continue to monitor.

## 2018-08-25 NOTE — PLAN OF CARE
Chief Complaint   Patient presents with    Altered Mental Status       To ER with EMS stating that the family called for altered mental status but was awake and alert when they arrived.  pt with slurred speech and has slept alot today, taking pain medication for an injury one week ago.       Pt was independent with ADLs prior to admit. Lives (in [place], [place]) with daughter, Citlaly Petersen and pt's minor grandson which pt cares for when daughter is at work. Pt has Rollator, O2 concentrator, WC (in [place]), BSC (placed over toilet in ), Shower Chair (but it is too large for her tub). Family to provide transportation for pt on discharge. Pt no longer drives, family provides transportation to appts.    TN met bedside with pt and daughter. TN provided pt with Discharge Planning Brochure and Business Card and wrote name on whiteboard    Pt's PCP not in system:     Dr. Froylan [facility]  Internal medicine   [address], [place] [address] [phone]       08/24/18 1753   Discharge Assessment   Assessment Type Discharge Planning Assessment   Confirmed/corrected address and phone number on facesheet? Yes   Assessment information obtained from? Patient;Caregiver  (pt and daughter: Citlaly Petersen [phone])   Expected Length of Stay (days) 3   Communicated expected length of stay with patient/caregiver yes   Prior to hospitilization cognitive status: Alert/Oriented   Prior to hospitalization functional status: Independent;Assistive Equipment   Current cognitive status: Alert/Oriented   Current Functional Status: Assistive Equipment;Needs Assistance   Facility Arrived From: (home), lives in [place] with daughter, visiting family in [facility]   Lives With child(nandini), adult  (daughter: Citlaly Petersen [phone])   Able to Return to Prior Arrangements yes   Is patient able to care for self after discharge? Yes   Who are your caregiver(s) and their phone number(s)? daughter: Citlaly Petersen  [phone]   Patient's perception of discharge disposition home or selfcare   Readmission Within The Last 30 Days no previous admission in last 30 days   Patient currently being followed by outpatient case management? No   Patient currently receives any other outside agency services? No   Equipment Currently Used at Home rollator;oxygen;wheelchair;3-in-1 commode;shower chair  (O2 concentrator only, [facility] [phone]--, WC is in [place], Shower chair too large for her tub)   Do you have any problems affording any of your prescribed medications? No   Is the patient taking medications as prescribed? yes   Does the patient have transportation home? Yes   Transportation Available family or friend will provide   Dialysis Name and Scheduled days N/A   Does the patient receive services at the [facility]? No   Discharge Plan A Home   Discharge Plan B Home with family   Patient/Family In Agreement With Plan yes     Celina Armenta RN Transitional Navigator  [phone]           No. SHARONDA screening performed.  STOP BANG Legend: 0-2 = LOW Risk; 3-4 = INTERMEDIATE Risk; 5-8 = HIGH Risk

## 2018-08-25 NOTE — PLAN OF CARE
Informed Dr. Hernandes concerning of pt O2 sat on RA 84%. Pt complaints of SOB. Nurse put her on 2L sat 97%.

## 2018-08-25 NOTE — PROGRESS NOTES
PGY-2 Progress Note [facility]  Follow up for: CVA evaluation    Chief Complaint   Patient presents with    Altered Mental Status     To ER with EMS stating that the family called for altered mental status but was awake and alert when they arrived.  pt with slurred speech and has slept alot today, taking pain medication for an injury one week ago.     Hospital Stay Day 2    Subjective: Afebrile overnight. Patient reports improvement in pain, but states that the pain is still present in her lower back as well as neck. Requesting an increase in pain meds. States that her nausea is persistent, but has improved slightly.       Scheduled Meds:   albuterol-ipratropium  3 mL Nebulization Q4H    aspirin  81 mg Oral Daily    atorvastatin  40 mg Oral Daily    cloNIDine  0.1 mg Oral BID    clopidogrel  75 mg Oral Daily    diclofenac sodium   Topical (Top) Daily    diltiaZEM  180 mg Oral Daily    enoxaparin  40 mg Subcutaneous Daily    furosemide  40 mg Oral BID    lidocaine  1 patch Transdermal Q24H    piperacillin-tazobactam (ZOSYN) IVPB  4.5 g Intravenous Q8H    sodium chloride 0.9%  3 mL Intravenous Q8H    tobramycin-dexamethasone 0.3-0.1%  1 drop Both Eyes Q4H While awake    vancomycin (VANCOCIN) IVPB  15 mg/kg (Order-Specific) Intravenous Q24H     Continuous Infusions:   lactated ringers Stopped (18 0100)     PRN Meds:acetaminophen, albuterol, albuterol-ipratropium, artificial tears, dextrose 50%, glucagon (human recombinant), insulin aspart U-100, labetalol, loperamide, ondansetron    Review of patient's allergies indicates:   Allergen Reactions    Ace inhibitors Swelling    Corticosteroids (glucocorticoids)     Hydralazine analogues     Tetracyclines Swelling    Travatan (with benzalkonium) [travoprost (benzalkonium)]        Objectives:     Vitals(Most Recent)      BP  Min: 96/51  Max: 129/60  Temp  Av.7 °F (37.1 °C)  Min: 98.4 °F (36.9 °C)  Max: 99 °F (37.2 °C)  Pulse  Av.8  Min: 83   Max: 115  Resp  Av.2  Min: 16  Max: 26  SpO2  Av.4 %  Min: 90 %  Max: 94 %             Vitals([id number])  Temp:  [98.4 °F (36.9 °C)-99 °F (37.2 °C)]   Pulse:  []   Resp:  [16-26]   BP: ()/(50-60)   SpO2:  [90 %-94 %]     I & O(Bsfx90q)    Intake/Output Summary (Last 24 hours) at 2018 0746  Last data filed at 2018 0124  Gross per 24 hour   Intake 1160 ml   Output 783 ml   Net 377 ml     Constitutional: Patient is oriented to person, place, and time  HENT: Head: Normocephalic   Eyes: Dry skin on eyelids.  EOM are normal. Pupils are equal, round, and reactive to light. Neck: Normal range of motion. Neck supple. No JVD present. No tracheal deviation present.   Cardiovascular: Tachycardic, regular rhythm, normal heart sounds and intact distal pulses.  Exam reveals no gallop and no friction rub.  No murmur heard.  Pulmonary/Chest: Effort normal and breath sounds normal. No respiratory distress.   Abdominal: Soft. Bowel sounds are normal. Patient exhibits no distension and no mass. There is no tenderness.   Musculoskeletal: Normal range of motion. Patient exhibits no edema.  TTP of sacral region   Skin: Skin is warm and dry. No rash noted. No erythema. No pallor.   Psychiatric: unable to ascess   Neuro: patient not participating in exam, difficult to obtain    LABS  CBC  Recent Labs   Lab  18   2148  18   0452  18   0625   WBC  10.48  17.16*  13.55*  13.55*   RBC  3.99*  4.15  3.69*  3.69*   HGB  9.4*  9.7*  8.6*  8.6*   HCT  32.4*  33.1*  29.2*  29.2*   PLT  246  276  257  257   MCV  81*  80*  79*  79*   MCH  23.6*  23.4*  23.3*  23.3*   MCHC  29.0*  29.3*  29.5*  29.5*     BMP  Recent Labs   Lab  18   0452  18   1206  18   0624   NA  137  135*  137   K  3.2*  3.5  3.2*   CO2  31*  32*  32*   CL  93*  91*  95   BUN  16  20  16   CREATININE  1.3  1.7*  1.3   GLU  183*  238*  147*     POCT-Glucose  POCT Glucose   Date Value Ref Range Status    08/25/2018 150 (H) 70 - 110 mg/dL Final   08/24/2018 183 (H) 70 - 110 mg/dL Final   08/24/2018 217 (H) 70 - 110 mg/dL Final   08/24/2018 230 (H) 70 - 110 mg/dL Final   08/24/2018 149 (H) 70 - 110 mg/dL Final   08/23/2018 193 (H) 70 - 110 mg/dL Final   08/23/2018 162 (H) 70 - 110 mg/dL Final   08/23/2018 172 (H) 70 - 110 mg/dL Final   08/23/2018 115 (H) 70 - 110 mg/dL Final   08/23/2018 241 (H) 70 - 110 mg/dL Final   08/22/2018 241 (H) 70 - 110 mg/dL Final       Recent Labs   Lab  08/23/18   0420  08/24/18   0452  08/24/18   1206  08/25/18   0624   CALCIUM  9.7  9.7  9.6  8.8   MG  1.6  1.2*   --   1.2*   PHOS  3.5  3.7   --   3.2     LFT  Recent Labs   Lab  08/23/18   0420  08/24/18   0452  08/25/18   0624   PROT  6.6  6.5  5.8*   ALBUMIN  2.6*  2.5*  2.2*   BILITOT  0.4  0.8  0.7   AST  19  17  22   ALKPHOS  97  105  108   ALT  18  13  17     COAGS  Recent Labs   Lab  08/23/18   0420   INR  0.9   APTT  29.0     CE  Recent Labs   Lab  08/23/18   0420  08/23/18   1930  08/25/18   0624   TROPONINI  0.267*  0.233*  0.098*     BNP  Recent Labs   Lab  08/23/18   0217   BNP  25     LAST HbA1c  Lab Results   Component Value Date    HGBA1C 8.1 (H) 08/23/2018     Imaging  Imaging Results          US Carotid Bilateral (Final result)  Result time 08/23/18 09:08:55    Final result by Marc Lemos DO (08/23/18 09:08:55)                 Impression:      Limited study by body habitus.    Allowing for limitation, 50-69% right ICA stenosis with less than 50% left ICA stenosis.    Antegrade vertebral arteries bilaterally..      Electronically signed by: Marc Lemos DO  Date:    08/23/2018  Time:    09:08             Narrative:    EXAMINATION:  US CAROTID BILATERAL    CLINICAL HISTORY:  stroke; Altered mental status, unspecified    TECHNIQUE:  Grayscale and color Doppler ultrasound examination of the carotid and vertebral artery systems bilaterally.    COMPARISON:  None.    FINDINGS:  Results: the right common carotid artery  is patent. The peak systolic velocities approximately 47 cm/sec which is within normal limits.  . The peak systolic velocities of the right proximal, mid and distal internal carotid artery are approximately 47, 78, and 178 cm/sec respectively this corresponds to an ICA/CCA ratio of approximately 3.7 which is within normal limits.  Please note evaluation limited by body habitus with difficulty penetrating within limits of the study increased velocity the concerning for 50-69% ICA stenosis.    The right vertebral artery is antegrade.    The left common carotid artery is patent. The peak systolic velocity is approximately 45 cm/sec which is within normal limits. Peak systolic velocity of the proximal, mid and distal left internal carotid arteries is approximately 38, 49, and 49 cm/sec respectively. This corresponds to an ICA/CCA ratio of approximately 1.1 which is within normal limits.    The left vertebral artery is antegrade.    Measurement of carotid stenosis is based on velocity parameters that correlate the residual internal carotid diameter with [place] symptomatic carotid endarterectomy trial (NASCET) - based stenosis levels.                               CT Head Without Contrast (Final result)  Result time 08/22/18 22:19:26    Final result by Darwin Cool MD (08/22/18 22:19:26)                 Impression:      1. Apparent punctate foci of parenchymal hypoattenuation within the bilateral thalami concerning for age indeterminate lacunar infarcts, presumably old.  Clinical correlation is recommended.      Electronically signed by: Darwin Cool MD  Date:    08/22/2018  Time:    22:19             Narrative:    EXAMINATION:  CT HEAD WITHOUT CONTRAST    CLINICAL HISTORY:  Confusion/delirium, altered LOC, unexplained;    TECHNIQUE:  5-mm axial images were obtained through the head without the use of contrast.  Coronal and sagittal reformats were performed.    COMPARISON:  None.    FINDINGS:  Punctate  foci of parenchymal hypoattenuation noted within the bilateral thalami concerning for age indeterminate lacunar infarcts, presumably old.  No CT findings to suggest an acute major vascular distribution infarct.  No intra or extra-axial hemorrhage.  No midline shift or mass effect.  No hydrocephalus.  Sellar region is unremarkable.    Paranasal sinuses and mastoid air cells are clear.  There is heterogeneity of the calvarium, possibly related to an underlying metabolic disorder.  Subcutaneous soft tissues are normal.                               X-Ray Chest 1 View (Final result)  Result time 08/22/18 22:21:33    Final result by Darwin Cool MD (08/22/18 22:21:33)                 Impression:      1. No acute radiographic findings in the chest on this single view.      Electronically signed by: Darwin Cool MD  Date:    08/22/2018  Time:    22:21             Narrative:    EXAMINATION:  XR CHEST 1 VIEW    CLINICAL HISTORY:  AMS;    TECHNIQUE:  Single frontal view of the chest was performed.    COMPARISON:  None    FINDINGS:  Partially visualized catheter projects to the right of midline, presumably over the SVC.  Spinal cord stimulator projects over the midline thorax.  Mediastinal structures are midline.  Cardiac silhouette is magnified by AP technique.  Lung volumes are symmetric.  No consolidation.  No pneumothorax or pleural effusions.  No free air beneath the diaphragm.  Degenerative changes of the shoulders noted.                                Micro:  Microbiology Results (last 7 days)     Procedure Component Value Units Date/Time    Urine culture [[id number]] Collected:  08/25/18 0628    Order Status:  Sent Specimen:  Urine, Clean Catch Updated:  08/25/18 0628    Blood culture [[id number]] Collected:  08/23/18 2041    Order Status:  Completed Specimen:  Blood Updated:  08/25/18 0612     Blood Culture, Routine No Growth to date     Blood Culture, Routine No Growth to date    Blood culture [[id number]]  Collected:  08/23/18 2041    Order Status:  Completed Specimen:  Blood Updated:  08/25/18 0612     Blood Culture, Routine No Growth to date     Blood Culture, Routine No Growth to date           Assessment/Plan:    63 y.o. 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      CVA    CT head is negative for acute hemorrhagic event  Anti-platelet therapy: ASA, continue home plavix  Atorvastatin for secondary stroke prevention   Labs: HgA1C, Lipid Panel, TSH, Vitamin B-12, B6, Thiamine, Folate, RPR- WNL  Q4 nuero checks  PT/OT/ST  Fall precautions  Drug screen pos for opiates     Carotid Doppler ordered- Allowing for limitation, 50-69% right ICA stenosis with less than 50% left ICA stenosis.  Antegrade vertebral arteries bilaterally.    2D echo with bubble study- 1 - Concentric remodeling.     2 - No wall motion abnormalities.     3 - Normal left ventricular systolic function (EF 60-65%).     4 - Impaired LV relaxation, normal LAP (grade 1 diastolic dysfunction).     5 - Normal right ventricular systolic function .     6 - The estimated PA systolic pressure is 14 mmHg.     7 - No evidence of intracardiac shunt.     EEG performed- encephalopathy     CTA head and neck Unremarkable and without evidence for proximal significant stenosis or occlusion.     HTN  resume home meds      T2DM  - will cover with SSI     CAD  - Continue ASA and plavix     Elevated troponin- resolving   - 0.273-->0.267--> 0.233    Sepsis   - Started on 30mL/kg of saline  - vanc and zosyn  - blood cultures NGTD  - lactate obtained     PT/OT: ordered and following  Code: full  Diet: regular  Ppx: dvt: lovenox   Dispo: f/u CT L spine    Case discussed with staff    Arsenio Munson MD  [facility] HO[facility]  08/25/2018

## 2018-08-25 NOTE — PLAN OF CARE
Problem: Physical Therapy Goal  Goal: Physical Therapy Goal  Goals to be met by: 2018     Patient will increase functional independence with mobility by performin. Supine to sit with Modified [place]  2. Sit to supine with Modified [place]  3. Rolling to Left and Right with Modified [place].  4. Sit to stand transfer with Stand-by Assistance with AD  5. Bed to chair transfer with Stand-by Assistance with AD  6. Gait  x  feet with Stand-by Assistance using AD.   7. Ascend/Descend 8 inch curb step with Contact Guard Assistance and Minimal Assistance using AD  8. Lower extremity exercise program x10 reps with supervision     Outcome: Ongoing (interventions implemented as appropriate)  Pt limited 2/2 severe pain.  Continue with POC as able.

## 2018-08-25 NOTE — PLAN OF CARE
Nurse received report from Dr. Dias concerning of abscess found in her abdomen most likely secondary to acute appendicitis. Need to order CT abdomen and pelvis and consult general surgery, per MD Dias. Informed and notify Dr. Hernandes.

## 2018-08-25 NOTE — PROGRESS NOTES
Awaiting CT L spine tomorrow am to identify source of suspected occult infection - discitis vs vertebral osteomyelitis  Covered appropriately for now with Charisse and Nora Saravia

## 2018-08-25 NOTE — PT/OT/SLP PROGRESS
Physical Therapy Treatment    Patient Name:  Sheryl Martines   MRN:  [id number]    Recommendations:     Discharge Recommendations:  nursing facility, skilled   Discharge Equipment Recommendations: bath bench, wheelchair   Barriers to discharge: Decreased caregiver support    Assessment:     Sheryl Martines is a 63 y.o. female admitted with a medical diagnosis of Altered mental status.  She presents with the following impairments/functional limitations:  weakness, impaired self care skills, impaired endurance, impaired functional mobilty, gait instability, impaired balance, decreased lower extremity function, decreased upper extremity function, decreased coordination, decreased safety awareness, pain, impaired coordination pt remains limited 2/2 severe pain.    Rehab Prognosis:  Good; patient would benefit from acute skilled PT services to address these deficits and reach maximum level of function.      Recent Surgery: * No surgery found *      Plan:     During this hospitalization, patient to be seen 6 x/week to address the above listed problems via gait training, therapeutic exercises, therapeutic activities, wheelchair management/training, neuromuscular re-education  · Plan of Care Expires:  09/23/18   Plan of Care Reviewed with: patient    Subjective     Communicated with nsg prior to session.  Patient found HOB elevated upon PT entry to room, agreeable to treatment.      Chief Complaint: pain  Patient comments/goals: Decreased pain, PLOF  Pain/Comfort:  · Pain Rating 1: 8/10  · Location 1: back  · Pain Addressed 1: Reposition, Distraction, Pre-medicate for activity, Cessation of Activity, Nurse notified  · Pain Rating Post-Intervention 1: 9/10    Patients cultural, spiritual, Oriental orthodox conflicts given the current situation: none    Objective:     Patient found with: telemetry, peripheral IV     General Precautions: Standard, fall   Orthopedic Precautions:N/A   Braces:       Functional Mobility:  · Bed  Mobility:     · Scooting: maximal assistance  · Supine to Sit: moderate assistance  · Sit to Supine: moderate assistance  · Balance: Fair+ sitting EOB      AM-PAC 6 CLICK MOBILITY  Turning over in bed (including adjusting bedclothes, sheets and blankets)?: 3  Sitting down on and standing up from a chair with arms (e.g., wheelchair, bedside commode, etc.): 2  Moving from lying on back to sitting on the side of the bed?: 2  Moving to and from a bed to a chair (including a wheelchair)?: 2  Need to walk in hospital room?: 1  Climbing 3-5 steps with a railing?: 1  Basic Mobility Total Score: 11       Therapeutic Activities and Exercises:   Pt performed B AP's x 10 reps.  Dangle protocol, pt able to sit at EOB with SBA approx 5m before she had to lay back down 2/2 pain.      Patient left right sidelying with all lines intact, call button in reach and nsg notified..    GOALS:   Multidisciplinary Problems     Physical Therapy Goals        Problem: Physical Therapy Goal    Goal Priority Disciplines Outcome Goal Variances Interventions   Physical Therapy Goal     PT, PT/OT Ongoing (interventions implemented as appropriate)     Description:  Goals to be met by: 2018     Patient will increase functional independence with mobility by performin. Supine to sit with Modified [place]  2. Sit to supine with Modified [place]  3. Rolling to Left and Right with Modified [place].  4. Sit to stand transfer with Stand-by Assistance with AD  5. Bed to chair transfer with Stand-by Assistance with AD  6. Gait  x  feet with Stand-by Assistance using AD.   7. Ascend/Descend 8 inch curb step with Contact Guard Assistance and Minimal Assistance using AD  8. Lower extremity exercise program x10 reps with supervision             Problem: Physical Therapy Goal    Goal Priority Disciplines Outcome Goal Variances Interventions   Physical Therapy Goal     PT, PT/OT                      Time Tracking:     PT Received On:  08/25/18  PT Start Time: 1158     PT Stop Time: 1208  PT Total Time (min): 10 min     Billable Minutes: Therapeutic Activity 10       PT/PTA: PT     PTA Visit Number: 0     Oumou Chaudhary, SAMIA  08/25/2018

## 2018-08-25 NOTE — PLAN OF CARE
Problem: Patient Care Overview  Goal: Plan of Care Review  Pt blood pressure at bit low at 96/52, held 2100 clonidine and lasix. Pt reported that right side of body has chronic tingling but no numbness. No change in  Pupils, 2 mm perrla, or orientation.      Tele: NSR to ST,  Janine 90s to low 100s HR,  No alarms.     Bed in lowest position, wheels locked, non skid socks, ID band worn, personal items and call bell with in reach, bed alarm set.

## 2018-08-26 ENCOUNTER — ANESTHESIA (OUTPATIENT)
Dept: SURGERY | Facility: HOSPITAL | Age: 63
DRG: 853 | End: 2018-08-26
Payer: MEDICARE

## 2018-08-26 ENCOUNTER — ANESTHESIA EVENT (OUTPATIENT)
Dept: SURGERY | Facility: HOSPITAL | Age: 63
DRG: 853 | End: 2018-08-26
Payer: MEDICARE

## 2018-08-26 PROBLEM — K35.33 APPENDICITIS WITH PERITONITIS: Status: ACTIVE | Noted: 2018-08-26

## 2018-08-26 LAB
ALBUMIN SERPL BCP-MCNC: 1.9 G/DL
ALBUMIN SERPL BCP-MCNC: 2.1 G/DL
ALLENS TEST: ABNORMAL
ALLENS TEST: ABNORMAL
ALP SERPL-CCNC: 111 U/L
ALP SERPL-CCNC: 118 U/L
ALT SERPL W/O P-5'-P-CCNC: 15 U/L
ALT SERPL W/O P-5'-P-CCNC: 16 U/L
ANION GAP SERPL CALC-SCNC: 7 MMOL/L
ANION GAP SERPL CALC-SCNC: 7 MMOL/L
ANISOCYTOSIS BLD QL SMEAR: SLIGHT
AST SERPL-CCNC: 22 U/L
AST SERPL-CCNC: 22 U/L
BACTERIA UR CULT: NO GROWTH
BASOPHILS # BLD AUTO: 0.02 K/UL
BASOPHILS # BLD AUTO: 0.03 K/UL
BASOPHILS NFR BLD: 0.2 %
BASOPHILS NFR BLD: 0.2 %
BILIRUB SERPL-MCNC: 0.5 MG/DL
BILIRUB SERPL-MCNC: 0.5 MG/DL
BNP SERPL-MCNC: 45 PG/ML
BUN SERPL-MCNC: 10 MG/DL
BUN SERPL-MCNC: 10 MG/DL
CALCIUM SERPL-MCNC: 7.9 MG/DL
CALCIUM SERPL-MCNC: 8.5 MG/DL
CHLORIDE SERPL-SCNC: 95 MMOL/L
CHLORIDE SERPL-SCNC: 97 MMOL/L
CO2 SERPL-SCNC: 30 MMOL/L
CO2 SERPL-SCNC: 34 MMOL/L
CREAT SERPL-MCNC: 1.1 MG/DL
CREAT SERPL-MCNC: 1.8 MG/DL
DELSYS: ABNORMAL
DELSYS: ABNORMAL
DIFFERENTIAL METHOD: ABNORMAL
DIFFERENTIAL METHOD: ABNORMAL
EOSINOPHIL # BLD AUTO: 0.2 K/UL
EOSINOPHIL # BLD AUTO: 0.4 K/UL
EOSINOPHIL NFR BLD: 1.3 %
EOSINOPHIL NFR BLD: 3 %
ERYTHROCYTE [DISTWIDTH] IN BLOOD BY AUTOMATED COUNT: 16 %
ERYTHROCYTE [DISTWIDTH] IN BLOOD BY AUTOMATED COUNT: 16.1 %
ERYTHROCYTE [SEDIMENTATION RATE] IN BLOOD BY WESTERGREN METHOD: 14 MM/H
EST. GFR  (AFRICAN AMERICAN): 34 ML/MIN/1.73 M^2
EST. GFR  (AFRICAN AMERICAN): >60 ML/MIN/1.73 M^2
EST. GFR  (NON AFRICAN AMERICAN): 30 ML/MIN/1.73 M^2
EST. GFR  (NON AFRICAN AMERICAN): 54 ML/MIN/1.73 M^2
FIO2: 100
FIO2: 95
GLUCOSE SERPL-MCNC: 150 MG/DL
GLUCOSE SERPL-MCNC: 207 MG/DL
GRAM STN SPEC: NORMAL
HCO3 UR-SCNC: 34.4 MMOL/L (ref 24–28)
HCO3 UR-SCNC: 35.7 MMOL/L (ref 24–28)
HCT VFR BLD AUTO: 27.4 %
HCT VFR BLD AUTO: 28.7 %
HGB BLD-MCNC: 7.3 G/DL
HGB BLD-MCNC: 7.9 G/DL
HGB BLD-MCNC: 8.3 G/DL
HYPOCHROMIA BLD QL SMEAR: ABNORMAL
LYMPHOCYTES # BLD AUTO: 1.6 K/UL
LYMPHOCYTES # BLD AUTO: 1.9 K/UL
LYMPHOCYTES NFR BLD: 12 %
LYMPHOCYTES NFR BLD: 16.5 %
MAGNESIUM SERPL-MCNC: 1.1 MG/DL
MCH RBC QN AUTO: 23.1 PG
MCH RBC QN AUTO: 23.6 PG
MCHC RBC AUTO-ENTMCNC: 28.8 G/DL
MCHC RBC AUTO-ENTMCNC: 28.9 G/DL
MCV RBC AUTO: 80 FL
MCV RBC AUTO: 82 FL
MIN VOL: 11
MODE: ABNORMAL
MODE: ABNORMAL
MONOCYTES # BLD AUTO: 1.1 K/UL
MONOCYTES # BLD AUTO: 1.2 K/UL
MONOCYTES NFR BLD: 8.8 %
MONOCYTES NFR BLD: 9 %
NEUTROPHILS # BLD AUTO: 10.5 K/UL
NEUTROPHILS # BLD AUTO: 8.3 K/UL
NEUTROPHILS NFR BLD: 71.1 %
NEUTROPHILS NFR BLD: 77.7 %
PCO2 BLDA: 33.8 MMHG (ref 35–45)
PCO2 BLDA: 59.1 MMHG (ref 35–45)
PEEP: 5
PEEP: 5
PH SMN: 7.39 [PH] (ref 7.35–7.45)
PH SMN: 7.62 [PH] (ref 7.35–7.45)
PHOSPHATE SERPL-MCNC: 2.8 MG/DL
PLATELET # BLD AUTO: 273 K/UL
PLATELET # BLD AUTO: 279 K/UL
PMV BLD AUTO: 9.4 FL
PMV BLD AUTO: 9.8 FL
PO2 BLDA: 123 MMHG (ref 80–100)
PO2 BLDA: 224 MMHG (ref 80–100)
POC BE: 11 MMOL/L
POC BE: 13 MMOL/L
POC SATURATED O2: 100 % (ref 95–100)
POC SATURATED O2: 99 % (ref 95–100)
POC TCO2: 35 MMOL/L (ref 23–27)
POC TCO2: 37 MMOL/L (ref 23–27)
POCT GLUCOSE: 102 MG/DL (ref 70–110)
POCT GLUCOSE: 134 MG/DL (ref 70–110)
POIKILOCYTOSIS BLD QL SMEAR: SLIGHT
POTASSIUM SERPL-SCNC: 3.4 MMOL/L
POTASSIUM SERPL-SCNC: 3.6 MMOL/L
PROT SERPL-MCNC: 5 G/DL
PROT SERPL-MCNC: 5.7 G/DL
PS: 10
RBC # BLD AUTO: 3.42 M/UL
RBC # BLD AUTO: 3.51 M/UL
SAMPLE: ABNORMAL
SAMPLE: ABNORMAL
SITE: ABNORMAL
SITE: ABNORMAL
SODIUM SERPL-SCNC: 134 MMOL/L
SODIUM SERPL-SCNC: 136 MMOL/L
SP02: 30
SPONT RATE: 44
TARGETS BLD QL SMEAR: ABNORMAL
VANCOMYCIN TROUGH SERPL-MCNC: 7.4 UG/ML
VT: 500
WBC # BLD AUTO: 11.66 K/UL
WBC # BLD AUTO: 13.57 K/UL

## 2018-08-26 PROCEDURE — 0BH17EZ INSERTION OF ENDOTRACHEAL AIRWAY INTO TRACHEA, VIA NATURAL OR ARTIFICIAL OPENING: ICD-10-PCS | Performed by: SURGERY

## 2018-08-26 PROCEDURE — 25000242 PHARM REV CODE 250 ALT 637 W/ HCPCS: Performed by: STUDENT IN AN ORGANIZED HEALTH CARE EDUCATION/TRAINING PROGRAM

## 2018-08-26 PROCEDURE — 25000003 PHARM REV CODE 250: Performed by: STUDENT IN AN ORGANIZED HEALTH CARE EDUCATION/TRAINING PROGRAM

## 2018-08-26 PROCEDURE — 20000000 HC ICU ROOM

## 2018-08-26 PROCEDURE — 25000003 PHARM REV CODE 250: Performed by: FAMILY MEDICINE

## 2018-08-26 PROCEDURE — 63600175 PHARM REV CODE 636 W HCPCS: Performed by: STUDENT IN AN ORGANIZED HEALTH CARE EDUCATION/TRAINING PROGRAM

## 2018-08-26 PROCEDURE — 25000003 PHARM REV CODE 250

## 2018-08-26 PROCEDURE — 0DJD4ZZ INSPECTION OF LOWER INTESTINAL TRACT, PERCUTANEOUS ENDOSCOPIC APPROACH: ICD-10-PCS | Performed by: SURGERY

## 2018-08-26 PROCEDURE — 83735 ASSAY OF MAGNESIUM: CPT

## 2018-08-26 PROCEDURE — 0DTJ0ZZ RESECTION OF APPENDIX, OPEN APPROACH: ICD-10-PCS | Performed by: SURGERY

## 2018-08-26 PROCEDURE — 94799 UNLISTED PULMONARY SVC/PX: CPT

## 2018-08-26 PROCEDURE — 25000003 PHARM REV CODE 250: Performed by: INTERNAL MEDICINE

## 2018-08-26 PROCEDURE — 37000009 HC ANESTHESIA EA ADD 15 MINS: Performed by: SURGERY

## 2018-08-26 PROCEDURE — 36600 WITHDRAWAL OF ARTERIAL BLOOD: CPT

## 2018-08-26 PROCEDURE — S0030 INJECTION, METRONIDAZOLE: HCPCS | Performed by: STUDENT IN AN ORGANIZED HEALTH CARE EDUCATION/TRAINING PROGRAM

## 2018-08-26 PROCEDURE — 93005 ELECTROCARDIOGRAM TRACING: CPT

## 2018-08-26 PROCEDURE — 85018 HEMOGLOBIN: CPT

## 2018-08-26 PROCEDURE — 85025 COMPLETE CBC W/AUTO DIFF WBC: CPT

## 2018-08-26 PROCEDURE — 27201423 OPTIME MED/SURG SUP & DEVICES STERILE SUPPLY: Performed by: SURGERY

## 2018-08-26 PROCEDURE — 63600175 PHARM REV CODE 636 W HCPCS: Performed by: FAMILY MEDICINE

## 2018-08-26 PROCEDURE — 63600175 PHARM REV CODE 636 W HCPCS

## 2018-08-26 PROCEDURE — 87076 CULTURE ANAEROBE IDENT EACH: CPT

## 2018-08-26 PROCEDURE — 82803 BLOOD GASES ANY COMBINATION: CPT

## 2018-08-26 PROCEDURE — 87205 SMEAR GRAM STAIN: CPT

## 2018-08-26 PROCEDURE — 63600175 PHARM REV CODE 636 W HCPCS: Mod: JG | Performed by: INTERNAL MEDICINE

## 2018-08-26 PROCEDURE — 87186 SC STD MICRODIL/AGAR DIL: CPT | Mod: 59

## 2018-08-26 PROCEDURE — 36000709 HC OR TIME LEV III EA ADD 15 MIN: Performed by: SURGERY

## 2018-08-26 PROCEDURE — 94761 N-INVAS EAR/PLS OXIMETRY MLT: CPT

## 2018-08-26 PROCEDURE — 88304 TISSUE EXAM BY PATHOLOGIST: CPT | Mod: 26,,, | Performed by: PATHOLOGY

## 2018-08-26 PROCEDURE — 83880 ASSAY OF NATRIURETIC PEPTIDE: CPT

## 2018-08-26 PROCEDURE — 80053 COMPREHEN METABOLIC PANEL: CPT

## 2018-08-26 PROCEDURE — 94640 AIRWAY INHALATION TREATMENT: CPT

## 2018-08-26 PROCEDURE — 80053 COMPREHEN METABOLIC PANEL: CPT | Mod: 91

## 2018-08-26 PROCEDURE — 87075 CULTR BACTERIA EXCEPT BLOOD: CPT

## 2018-08-26 PROCEDURE — 80202 ASSAY OF VANCOMYCIN: CPT

## 2018-08-26 PROCEDURE — 36415 COLL VENOUS BLD VENIPUNCTURE: CPT

## 2018-08-26 PROCEDURE — 94003 VENT MGMT INPAT SUBQ DAY: CPT

## 2018-08-26 PROCEDURE — 25000003 PHARM REV CODE 250: Performed by: SURGERY

## 2018-08-26 PROCEDURE — 87077 CULTURE AEROBIC IDENTIFY: CPT

## 2018-08-26 PROCEDURE — 88304 TISSUE EXAM BY PATHOLOGIST: CPT | Performed by: PATHOLOGY

## 2018-08-26 PROCEDURE — 25000003 PHARM REV CODE 250: Performed by: OPHTHALMOLOGY

## 2018-08-26 PROCEDURE — 84100 ASSAY OF PHOSPHORUS: CPT

## 2018-08-26 PROCEDURE — 36000708 HC OR TIME LEV III 1ST 15 MIN: Performed by: SURGERY

## 2018-08-26 PROCEDURE — A4216 STERILE WATER/SALINE, 10 ML: HCPCS | Performed by: STUDENT IN AN ORGANIZED HEALTH CARE EDUCATION/TRAINING PROGRAM

## 2018-08-26 PROCEDURE — P9047 ALBUMIN (HUMAN), 25%, 50ML: HCPCS | Mod: JG | Performed by: INTERNAL MEDICINE

## 2018-08-26 PROCEDURE — 37000008 HC ANESTHESIA 1ST 15 MINUTES: Performed by: SURGERY

## 2018-08-26 PROCEDURE — 63600175 PHARM REV CODE 636 W HCPCS: Performed by: SURGERY

## 2018-08-26 PROCEDURE — 5A1945Z RESPIRATORY VENTILATION, 24-96 CONSECUTIVE HOURS: ICD-10-PCS | Performed by: SURGERY

## 2018-08-26 PROCEDURE — 27000221 HC OXYGEN, UP TO 24 HOURS

## 2018-08-26 PROCEDURE — 99900035 HC TECH TIME PER 15 MIN (STAT)

## 2018-08-26 PROCEDURE — 87070 CULTURE OTHR SPECIMN AEROBIC: CPT

## 2018-08-26 RX ORDER — PHENYLEPHRINE HYDROCHLORIDE 10 MG/ML
INJECTION INTRAVENOUS
Status: DISCONTINUED | OUTPATIENT
Start: 2018-08-26 | End: 2018-08-26

## 2018-08-26 RX ORDER — FENTANYL CITRATE 50 UG/ML
INJECTION, SOLUTION INTRAMUSCULAR; INTRAVENOUS
Status: DISCONTINUED | OUTPATIENT
Start: 2018-08-26 | End: 2018-08-26

## 2018-08-26 RX ORDER — GLYCOPYRROLATE 0.2 MG/ML
INJECTION INTRAMUSCULAR; INTRAVENOUS
Status: DISCONTINUED | OUTPATIENT
Start: 2018-08-26 | End: 2018-08-26

## 2018-08-26 RX ORDER — MAGNESIUM SULFATE HEPTAHYDRATE 40 MG/ML
2 INJECTION, SOLUTION INTRAVENOUS ONCE
Status: DISCONTINUED | OUTPATIENT
Start: 2018-08-26 | End: 2018-09-01

## 2018-08-26 RX ORDER — PROPOFOL 10 MG/ML
5 INJECTION, EMULSION INTRAVENOUS CONTINUOUS
Status: DISCONTINUED | OUTPATIENT
Start: 2018-08-26 | End: 2018-08-27

## 2018-08-26 RX ORDER — IPRATROPIUM BROMIDE AND ALBUTEROL SULFATE 2.5; .5 MG/3ML; MG/3ML
3 SOLUTION RESPIRATORY (INHALATION) EVERY 4 HOURS
Status: DISCONTINUED | OUTPATIENT
Start: 2018-08-26 | End: 2018-08-26

## 2018-08-26 RX ORDER — BUPIVACAINE HYDROCHLORIDE 2.5 MG/ML
INJECTION, SOLUTION EPIDURAL; INFILTRATION; INTRACAUDAL
Status: DISCONTINUED | OUTPATIENT
Start: 2018-08-26 | End: 2018-08-26 | Stop reason: HOSPADM

## 2018-08-26 RX ORDER — ETOMIDATE 2 MG/ML
INJECTION INTRAVENOUS
Status: DISCONTINUED | OUTPATIENT
Start: 2018-08-26 | End: 2018-08-26

## 2018-08-26 RX ORDER — ACETAMINOPHEN 10 MG/ML
1000 INJECTION, SOLUTION INTRAVENOUS ONCE
Status: COMPLETED | OUTPATIENT
Start: 2018-08-26 | End: 2018-08-26

## 2018-08-26 RX ORDER — SODIUM CHLORIDE 9 MG/ML
INJECTION, SOLUTION INTRAVENOUS CONTINUOUS
Status: DISCONTINUED | OUTPATIENT
Start: 2018-08-26 | End: 2018-08-27

## 2018-08-26 RX ORDER — HYDROMORPHONE HYDROCHLORIDE 2 MG/ML
0.25 INJECTION, SOLUTION INTRAMUSCULAR; INTRAVENOUS; SUBCUTANEOUS
Status: DISCONTINUED | OUTPATIENT
Start: 2018-08-26 | End: 2018-09-01

## 2018-08-26 RX ORDER — NALOXONE HCL 0.4 MG/ML
VIAL (ML) INJECTION
Status: DISCONTINUED | OUTPATIENT
Start: 2018-08-26 | End: 2018-08-26

## 2018-08-26 RX ORDER — CEFAZOLIN SODIUM 2 G/50ML
2 SOLUTION INTRAVENOUS
Status: DISCONTINUED | OUTPATIENT
Start: 2018-08-26 | End: 2018-08-26 | Stop reason: HOSPADM

## 2018-08-26 RX ORDER — NEOSTIGMINE METHYLSULFATE 1 MG/ML
INJECTION, SOLUTION INTRAVENOUS
Status: DISCONTINUED | OUTPATIENT
Start: 2018-08-26 | End: 2018-08-26

## 2018-08-26 RX ORDER — ALBUMIN HUMAN 50 G/1000ML
SOLUTION INTRAVENOUS
Status: DISPENSED
Start: 2018-08-26 | End: 2018-08-27

## 2018-08-26 RX ORDER — SUCCINYLCHOLINE CHLORIDE 20 MG/ML
INJECTION INTRAMUSCULAR; INTRAVENOUS
Status: DISCONTINUED | OUTPATIENT
Start: 2018-08-26 | End: 2018-08-26

## 2018-08-26 RX ORDER — ETOMIDATE 2 MG/ML
INJECTION INTRAVENOUS
Status: COMPLETED
Start: 2018-08-26 | End: 2018-08-26

## 2018-08-26 RX ORDER — LANOLIN ALCOHOL/MO/W.PET/CERES
50 CREAM (GRAM) TOPICAL DAILY
Status: ON HOLD | COMMUNITY
End: 2018-09-23

## 2018-08-26 RX ORDER — PHENYLEPHRINE HYDROCHLORIDE 10 MG/ML
INJECTION INTRAVENOUS
Status: DISPENSED
Start: 2018-08-26 | End: 2018-08-27

## 2018-08-26 RX ORDER — DEXMEDETOMIDINE HYDROCHLORIDE 4 UG/ML
0.2 INJECTION, SOLUTION INTRAVENOUS CONTINUOUS
Status: DISCONTINUED | OUTPATIENT
Start: 2018-08-26 | End: 2018-08-27

## 2018-08-26 RX ORDER — POTASSIUM CHLORIDE 29.8 MG/ML
40 INJECTION INTRAVENOUS ONCE
Status: DISCONTINUED | OUTPATIENT
Start: 2018-08-26 | End: 2018-08-26

## 2018-08-26 RX ORDER — ROCURONIUM BROMIDE 10 MG/ML
INJECTION, SOLUTION INTRAVENOUS
Status: DISCONTINUED
Start: 2018-08-26 | End: 2018-08-26 | Stop reason: WASHOUT

## 2018-08-26 RX ORDER — PROPOFOL 10 MG/ML
INJECTION, EMULSION INTRAVENOUS
Status: DISPENSED
Start: 2018-08-26 | End: 2018-08-27

## 2018-08-26 RX ORDER — SUCCINYLCHOLINE CHLORIDE 20 MG/ML
INJECTION INTRAMUSCULAR; INTRAVENOUS
Status: COMPLETED
Start: 2018-08-26 | End: 2018-08-26

## 2018-08-26 RX ORDER — LIDOCAINE HCL/PF 100 MG/5ML
SYRINGE (ML) INTRAVENOUS
Status: DISCONTINUED | OUTPATIENT
Start: 2018-08-26 | End: 2018-08-26

## 2018-08-26 RX ORDER — SODIUM CHLORIDE, SODIUM LACTATE, POTASSIUM CHLORIDE, CALCIUM CHLORIDE 600; 310; 30; 20 MG/100ML; MG/100ML; MG/100ML; MG/100ML
INJECTION, SOLUTION INTRAVENOUS CONTINUOUS PRN
Status: DISCONTINUED | OUTPATIENT
Start: 2018-08-26 | End: 2018-08-26

## 2018-08-26 RX ORDER — ALBUMIN HUMAN 250 G/1000ML
12.5 SOLUTION INTRAVENOUS ONCE
Status: COMPLETED | OUTPATIENT
Start: 2018-08-26 | End: 2018-08-26

## 2018-08-26 RX ORDER — TRAMADOL HYDROCHLORIDE 100 MG/1
50 TABLET, EXTENDED RELEASE ORAL DAILY PRN
Status: ON HOLD | COMMUNITY
End: 2018-09-23

## 2018-08-26 RX ORDER — PREDNISONE 20 MG/1
20 TABLET ORAL 2 TIMES DAILY
Status: DISCONTINUED | OUTPATIENT
Start: 2018-08-26 | End: 2018-09-05 | Stop reason: HOSPADM

## 2018-08-26 RX ORDER — ESMOLOL HYDROCHLORIDE 20 MG/ML
INJECTION, SOLUTION INTRAVENOUS CONTINUOUS PRN
Status: DISCONTINUED | OUTPATIENT
Start: 2018-08-26 | End: 2018-08-26

## 2018-08-26 RX ORDER — HYDROMORPHONE HYDROCHLORIDE 2 MG/ML
INJECTION, SOLUTION INTRAMUSCULAR; INTRAVENOUS; SUBCUTANEOUS
Status: COMPLETED
Start: 2018-08-26 | End: 2018-08-26

## 2018-08-26 RX ORDER — PROPOFOL 10 MG/ML
INJECTION, EMULSION INTRAVENOUS
Status: DISCONTINUED
Start: 2018-08-26 | End: 2018-08-26 | Stop reason: WASHOUT

## 2018-08-26 RX ORDER — ROCURONIUM BROMIDE 10 MG/ML
INJECTION, SOLUTION INTRAVENOUS
Status: DISCONTINUED | OUTPATIENT
Start: 2018-08-26 | End: 2018-08-26

## 2018-08-26 RX ADMIN — FENTANYL CITRATE 50 MCG: 50 INJECTION, SOLUTION INTRAMUSCULAR; INTRAVENOUS at 09:08

## 2018-08-26 RX ADMIN — PHENYLEPHRINE HYDROCHLORIDE 200 MCG: 10 INJECTION INTRAVENOUS at 09:08

## 2018-08-26 RX ADMIN — GLYCOPYRROLATE 0.6 MG: 0.2 INJECTION, SOLUTION INTRAMUSCULAR; INTRAVENOUS at 10:08

## 2018-08-26 RX ADMIN — SUCCINYLCHOLINE CHLORIDE 160 MG: 20 INJECTION, SOLUTION INTRAMUSCULAR; INTRAVENOUS at 09:08

## 2018-08-26 RX ADMIN — IPRATROPIUM BROMIDE AND ALBUTEROL SULFATE 3 ML: .5; 3 SOLUTION RESPIRATORY (INHALATION) at 03:08

## 2018-08-26 RX ADMIN — LIDOCAINE HYDROCHLORIDE 80 MG: 20 INJECTION, SOLUTION INTRAVENOUS at 09:08

## 2018-08-26 RX ADMIN — Medication 3 ML: at 09:08

## 2018-08-26 RX ADMIN — TOBRAMYCIN AND DEXAMETHASONE 1 DROP: 3; 1 SUSPENSION/ DROPS OPHTHALMIC at 06:08

## 2018-08-26 RX ADMIN — PHENYLEPHRINE HYDROCHLORIDE 100 MCG/MIN: 10 INJECTION INTRAVENOUS at 09:08

## 2018-08-26 RX ADMIN — HYDROMORPHONE HYDROCHLORIDE 0.25 MG: 2 INJECTION INTRAMUSCULAR; INTRAVENOUS; SUBCUTANEOUS at 01:08

## 2018-08-26 RX ADMIN — PHENYLEPHRINE HYDROCHLORIDE 100 MCG: 10 INJECTION INTRAVENOUS at 09:08

## 2018-08-26 RX ADMIN — PHENYLEPHRINE HYDROCHLORIDE 110 MCG/MIN: 10 INJECTION INTRAVENOUS at 09:08

## 2018-08-26 RX ADMIN — ACETAMINOPHEN 650 MG: 325 TABLET, FILM COATED ORAL at 07:08

## 2018-08-26 RX ADMIN — ACETAMINOPHEN 1000 MG: 10 INJECTION, SOLUTION INTRAVENOUS at 11:08

## 2018-08-26 RX ADMIN — DEXMEDETOMIDINE HYDROCHLORIDE 0.2 MCG/KG/HR: 4 INJECTION, SOLUTION INTRAVENOUS at 01:08

## 2018-08-26 RX ADMIN — METRONIDAZOLE 500 MG: 500 INJECTION, SOLUTION INTRAVENOUS at 10:08

## 2018-08-26 RX ADMIN — LIDOCAINE HYDROCHLORIDE 1 ML: 10 INJECTION, SOLUTION INFILTRATION; PERINEURAL at 12:08

## 2018-08-26 RX ADMIN — DEXMEDETOMIDINE HYDROCHLORIDE 0.2 MCG/KG/HR: 4 INJECTION, SOLUTION INTRAVENOUS at 05:08

## 2018-08-26 RX ADMIN — NALOXONE HYDROCHLORIDE 40 MCG: 0.4 INJECTION, SOLUTION INTRAMUSCULAR; INTRAVENOUS; SUBCUTANEOUS at 11:08

## 2018-08-26 RX ADMIN — VANCOMYCIN HYDROCHLORIDE 1000 MG: 1 INJECTION, POWDER, LYOPHILIZED, FOR SOLUTION INTRAVENOUS at 05:08

## 2018-08-26 RX ADMIN — TRAMADOL HYDROCHLORIDE 50 MG: 50 TABLET, COATED ORAL at 12:08

## 2018-08-26 RX ADMIN — ESMOLOL HYDROCHLORIDE 10 MCG/KG/MIN: 20 INJECTION INTRAVENOUS at 10:08

## 2018-08-26 RX ADMIN — SODIUM CHLORIDE 500 ML: 0.9 INJECTION, SOLUTION INTRAVENOUS at 07:08

## 2018-08-26 RX ADMIN — METRONIDAZOLE 500 MG: 500 INJECTION, SOLUTION INTRAVENOUS at 02:08

## 2018-08-26 RX ADMIN — PIPERACILLIN AND TAZOBACTAM 4.5 G: 4; .5 INJECTION, POWDER, LYOPHILIZED, FOR SOLUTION INTRAVENOUS; PARENTERAL at 03:08

## 2018-08-26 RX ADMIN — GLYCOPYRROLATE 0.4 MG: 0.2 INJECTION, SOLUTION INTRAMUSCULAR; INTRAVENOUS at 10:08

## 2018-08-26 RX ADMIN — ROCURONIUM BROMIDE 10 MG: 10 INJECTION, SOLUTION INTRAVENOUS at 09:08

## 2018-08-26 RX ADMIN — METRONIDAZOLE 500 MG: 500 INJECTION, SOLUTION INTRAVENOUS at 06:08

## 2018-08-26 RX ADMIN — SODIUM CHLORIDE 500 ML: 0.9 INJECTION, SOLUTION INTRAVENOUS at 05:08

## 2018-08-26 RX ADMIN — NEOSTIGMINE METHYLSULFATE 2 MG: 1 INJECTION INTRAVENOUS at 10:08

## 2018-08-26 RX ADMIN — SODIUM CHLORIDE, SODIUM LACTATE, POTASSIUM CHLORIDE, AND CALCIUM CHLORIDE: .6; .31; .03; .02 INJECTION, SOLUTION INTRAVENOUS at 09:08

## 2018-08-26 RX ADMIN — PIPERACILLIN AND TAZOBACTAM 4.5 G: 4; .5 INJECTION, POWDER, LYOPHILIZED, FOR SOLUTION INTRAVENOUS; PARENTERAL at 05:08

## 2018-08-26 RX ADMIN — SODIUM CHLORIDE: 0.9 INJECTION, SOLUTION INTRAVENOUS at 09:08

## 2018-08-26 RX ADMIN — TOBRAMYCIN AND DEXAMETHASONE 1 DROP: 3; 1 SUSPENSION/ DROPS OPHTHALMIC at 09:08

## 2018-08-26 RX ADMIN — ROCURONIUM BROMIDE 5 MG: 10 INJECTION, SOLUTION INTRAVENOUS at 09:08

## 2018-08-26 RX ADMIN — ETOMIDATE 40 MG: 2 INJECTION INTRAVENOUS at 02:08

## 2018-08-26 RX ADMIN — SODIUM CHLORIDE, SODIUM LACTATE, POTASSIUM CHLORIDE, AND CALCIUM CHLORIDE: .6; .31; .03; .02 INJECTION, SOLUTION INTRAVENOUS at 10:08

## 2018-08-26 RX ADMIN — IPRATROPIUM BROMIDE AND ALBUTEROL SULFATE 3 ML: .5; 3 SOLUTION RESPIRATORY (INHALATION) at 08:08

## 2018-08-26 RX ADMIN — ALBUMIN HUMAN 12.5 G: 0.25 SOLUTION INTRAVENOUS at 09:08

## 2018-08-26 RX ADMIN — PREDNISONE 20 MG: 20 TABLET ORAL at 09:08

## 2018-08-26 RX ADMIN — SUCCINYLCHOLINE CHLORIDE 20 MG: 20 INJECTION, SOLUTION INTRAMUSCULAR; INTRAVENOUS at 02:08

## 2018-08-26 RX ADMIN — ETOMIDATE 16 MG: 2 INJECTION, SOLUTION INTRAVENOUS at 09:08

## 2018-08-26 RX ADMIN — ESMOLOL HYDROCHLORIDE 30 MCG/KG/MIN: 20 INJECTION INTRAVENOUS at 10:08

## 2018-08-26 RX ADMIN — PROPOFOL 5 MCG/KG/MIN: 10 INJECTION, EMULSION INTRAVENOUS at 04:08

## 2018-08-26 RX ADMIN — NALOXONE HYDROCHLORIDE 80 MCG: 0.4 INJECTION, SOLUTION INTRAMUSCULAR; INTRAVENOUS; SUBCUTANEOUS at 10:08

## 2018-08-26 RX ADMIN — LIDOCAINE 1 PATCH: 50 PATCH CUTANEOUS at 12:08

## 2018-08-26 RX ADMIN — ENOXAPARIN SODIUM 40 MG: 100 INJECTION SUBCUTANEOUS at 04:08

## 2018-08-26 RX ADMIN — NEOSTIGMINE METHYLSULFATE 3 MG: 1 INJECTION INTRAVENOUS at 10:08

## 2018-08-26 RX ADMIN — SODIUM CHLORIDE 500 ML: 0.9 INJECTION, SOLUTION INTRAVENOUS at 09:08

## 2018-08-26 RX ADMIN — HYDROMORPHONE HYDROCHLORIDE 0.25 MG: 2 INJECTION INTRAMUSCULAR; INTRAVENOUS; SUBCUTANEOUS at 06:08

## 2018-08-26 NOTE — PROGRESS NOTES
Transferred from surgery emergent intubation at the bedside per Dr Chaney Anesthesia, pt extubated in pacu, unable to tolerate extubation, connected to ICU monitor, nieves & OGT placed, dressing to RLQ with red moist drainage, restrained bilateral, per OR team, safety maintained, will continue to monitor

## 2018-08-26 NOTE — TRANSFER OF CARE
"Anesthesia Transfer of Care Note    Patient: Sheryl Martines    Procedure(s) Performed: Procedure(s) (LRB):  APPENDECTOMY, LAPAROSCOPIC---CONVERTED TO OPEN APPENDECTOMY @0950 (N/A)    Patient location: ICU    Anesthesia Type: general    Transport from OR: Transported from OR intubated on 100% O2 by AMBU with assisted ventilation. Upon arrival to PACU/ICU, patient attached to ventilator and auscultated to confirm bilateral breath sounds and adequate TV. Continuous ECG monitoring in transport. Continuous SpO2 monitoring in transport    Post pain: adequate analgesia    Post-procedure assessment: Failured extubation. Reintubated in ICU.     Post vital signs: unstable (On phenylephrine infusion)    Level of consciousness: sedated    Nausea/Vomiting: no nausea/vomiting    Complications: respiratory complications, Unable to tolerate extubation    Transfer of care protocol was followed      Last vitals:   Visit Vitals  /60 (BP Location: Right arm, Patient Position: Lying)   Pulse 80   Temp 36.9 °C (98.5 °F) (Oral)   Resp 18   Ht 5' 1" (1.549 m)   Wt 80.9 kg (178 lb 6 oz)   SpO2 98%   Breastfeeding? No   BMI 33.70 kg/m²     "

## 2018-08-26 NOTE — PLAN OF CARE
Pt transferred to ICU from surgery. Returned call to give report to GILBERT Puri in ICU. As per nurse said she is good.

## 2018-08-26 NOTE — OP NOTE
DATE OF PROCEDURE:  08/26/2018.    PREOPERATIVE DIAGNOSES:  Acute appendicitis, diabetes and hypertension status   post stroke, change in mental status prior to this point.    POSTOPERATIVE DIAGNOSES:  Acute appendicitis, diabetes and hypertension status   post stroke, change in mental status prior to this point.    OPERATION:  Attempted laparoscopic appendectomy unsuccessful and then open   appendectomy.    SURGEON:  Bernadine Melendrez MD    ANESTHESIA:  General.    ASSISTANT:  Not applicable.    SPECIMEN REMOVED:  Appendix.    ESTIMATED BLOOD LOSS:  40 mL    PROCEDURE AND FINDINGS:  This patient was admitted a couple of days ago with   history of confusion at home.  The patient is a known diabetic, has taken some   pain medicine.  The patient was worked up for possible sepsis, probably   originating from the back area.  Workup was negative on the MRI was suspicious   for abscess in the right lower quadrant area.  The patient underwent a CT scan   of the abdomen showed acute appendicitis with localized peritonitis.  The   patient was already on antibiotics, vancomycin and Flagyl.  The patient was more   awake.  The patient was recommended appendectomy, was cleared medically.  After   satisfactory general endotracheal anesthesia, the patient in supine position,   timeout was called.  The patient was confirmed.  Abdomen was prepped and draped   in normal sterile manner using ChloraPrep.  A small supraumbilical incision was   made.  Veress needle was introduced.  Abdominal cavity was insufflated using CO2   up to a pressure of 15.  A 10-mm trocar cannula was introduced through the   umbilical incision.  Abdominal cavity was insufflated using ____ up to 3 liters.    A 10 mm probe was introduced through the umbilical incision.  The patient was   found to have dense adhesion of the omentum to the right lower quadrant area,   unable to visualize the appendix.  It was decided to do an open appendectomy   since it would have  been very difficult to proceed with the procedure, probes   were withdrawn.  The area was removed.  The wound was closed using 0 Vicryl for   the fascia.  Skin was closed using skin staple.  A right McBurney incision was   made, was taken down to the deep subcutaneous tissue.  Subcutaneous bleeders   clamped and bovied, further taken down.  External oblique muscle and fascia was   opened in the direction of its fibers.  Internal and transverse was opened in   the direction of their fibers.  Peritoneal cavity was entered.  Cecum was   delivered to the surface.  The patient had dense adhesion in the area.  Complete   mobilization of the cecum was performed delivered to the surface.  Appendix was   then taken greatly to the right iliac fossa area incising the adhesions.  Then,   mesentery of the appendix was treated with LigaSure and base of the appendix   was treated with Endo-INDIA.  Specimen was removed.  Wound was thoroughly   irrigated with antibiotic solution.  Hemostasis satisfactorily maintained.    Abdomen was then closed using 0 Vicryl for the peritoneum.  Muscle and fascia   was closed using running #1 Vicryl, subcutaneous tissues interrupted 3-0 Vicryl,   skin was closed using skin staple.  Wound was then lightly packed with iodoform   packing with Xeroform, 4 x 4, and ABD pad.  Sterile gauze dressing was applied.    The instrument count, sponge count, and needle count was correct.  The patient   tolerated the procedure well.  Estimated blood loss was 40 mL.  Specimen   removed was appendix.    POSTOPERATIVE DIAGNOSES:  Acute appendicitis with localized peritonitis,   diabetes, hypertension, obesity, status post stroke, fractured spine.      MS/HN  dd: 08/26/2018 10:47:46 (CDT)  td: 08/26/2018 11:48:03 (CDT)  Doc ID   #[id number]  Job ID #[id number]    CC:

## 2018-08-26 NOTE — CONSULTS
[facility]  Critical Care - Medicine  Consult Note    Patient Name: Sheryl Martines  MRN: [id number]  Admission Date: 8/22/2018  Hospital Length of Stay: 3 days  Code Status: Full Code  Attending Physician: Cassy Wick MD  Primary Care Provider: Primary Doctor No   Principal Problem: Appendicitis with peritonitis    Consults  Subjective:     HPI:   63 year old AAF with past medical history of Asthma, COPD (PFTs unknown), CAD, DMII, HLD, HTN, previous PE, previous stroke with residual right sided weakenss, who is currently hospitalized since 8/23 for initial presentation of AMS, weakness and slurred speech.  After long work up she was found to have acute appendicitis with likely abscess formation.  She underwent appendectomy by Dr. Melendrez this morning and was extubated, however remained sedated and had to be re-intubated.  She was brought to the MICU for prolonged recovery.    When seen, she appears awake, but is anxious on the vent.  She is able to communicate via writing and hand gestures that she wants to be extubated.  Oxygen was weaned to 30% (from 100%) and she was placed on pressure support 10/5.  She was initially on precedex for comfort, but has since been stopped.  She is pulling tidal volumes on the low end (low 200s).  When placed on 5/5, she has tidal volumes of .      She is awakening, and will hopefully be a candidate for extubation soon.      Past Medical History:   Diagnosis Date    Asthma     Closed compression fracture of fourth lumbar vertebra     COPD (chronic obstructive pulmonary disease)     Coronary artery disease     Diabetes mellitus     Glaucoma     High cholesterol     Hypertension     Iritis     Pulmonary embolus     Stroke     rt sided weakness.       Past Surgical History:   Procedure Laterality Date    ABDOMINAL SURGERY      BACK SURGERY      stimulator    CATARACT EXTRACTION      HYSTERECTOMY         Review of patient's allergies  indicates:   Allergen Reactions    Ace inhibitors Swelling    Corticosteroids (glucocorticoids)     Hydralazine analogues     Tetracyclines Swelling    Travatan (with benzalkonium) [travoprost (benzalkonium)]        Family History     None        Tobacco Use    Smoking status: Never Smoker   Substance and Sexual Activity    Alcohol use: No     Frequency: Never    Drug use: No    Sexual activity: No     Partners: Male      Review of Systems  Objective:     Vital Signs (Most Recent):  Temp: 98.5 °F (36.9 °C) (08/26/18 0722)  Pulse: 90 (08/26/18 1319)  Resp: (!) 47 (08/26/18 1319)  BP: 138/68 (08/26/18 1240)  SpO2: (!) 94 % (08/26/18 1319) Vital Signs (24h Range):  Temp:  [97.6 °F (36.4 °C)-99.2 °F (37.3 °C)] 98.5 °F (36.9 °C)  Pulse:  [] 90  Resp:  [15-74] 47  SpO2:  [94 %-100 %] 94 %  BP: ()/(42-68) 138/68     Weight: 80.9 kg (178 lb 6 oz)  Body mass index is 33.7 kg/m².      Intake/Output Summary (Last 24 hours) at 8/26/2018 1512  Last data filed at 8/26/2018 1023  Gross per 24 hour   Intake 800 ml   Output 1003 ml   Net -203 ml       Physical Exam   Constitutional: Patient is awake on ventilator  HENT: Normocephalic, EOMI, PERRL.   Neck: Normal range of motion. Neck supple. No JVD present. No tracheal deviation present.   CV: Tachycardic, regular rhythm, normal heart sounds and intact distal pulses.  Exam reveals no gallop and no friction rub.  No murmur heard.  Pulmonary/Chest: Effort normal and breath sounds normal. No respiratory distress. Mild wheezing.  Abdominal: Soft. Bowel sounds are normal. Distended. Diffusely tender to palpation in postoperative abdomen.  Surgical dressing in place.  Musculoskeletal: Normal range of motion. No gross defecit   Skin: Skin is warm and dry.   Psychiatric: unable to ascess, on vent, appears anxious  Neuro: unable to perform at this time.          Vents:  Vent Mode: A/C (08/26/18 1319)  Ventilator Initiated: Yes (08/26/18 1319)  Set Rate: 10 bmp (08/26/18  1319)  Vt Set: 500 mL (08/26/18 1319)  PEEP/CPAP: 5 cmH20 (08/26/18 1319)  Oxygen Concentration (%): 100 (08/26/18 1319)  Peak Airway Pressure: 24 cmH2O (08/26/18 1319)  Total Ve: 7.89 mL (08/26/18 1319)  F/VT Ratio<105 (RSBI): (!) 102.84 (08/26/18 1319)    Lines/Drains/Airways     Central Venous Catheter Line                 Port A Cath Single Lumen right atrial;right subclavian -- days          Drain                 NG/OG Tube 08/26/18 1214 Center mouth less than 1 day          Airway                 Airway - Non-Surgical 08/26/18 1210 Endotracheal Tube less than 1 day         Airway - Non-Surgical 08/26/18 1212 Endotracheal Tube less than 1 day                Significant Labs:    CBC/Anemia Profile:  Recent Labs   Lab  08/25/18   0625  08/26/18   0433   WBC  13.55*  13.55*  11.66   HGB  8.6*  8.6*  7.9*   HCT  29.2*  29.2*  27.4*   PLT  257  257  273   MCV  79*  79*  80*   RDW  16.1*  16.1*  16.1*        Chemistries:  Recent Labs   Lab  08/25/18   0624  08/26/18   0433   NA  137  136   K  3.2*  3.4*   CL  95  95   CO2  32*  34*   BUN  16  10   CREATININE  1.3  1.1   CALCIUM  8.8  8.5*   ALBUMIN  2.2*  2.1*   PROT  5.8*  5.7*   BILITOT  0.7  0.5   ALKPHOS  108  118   ALT  17  16   AST  22  22   MG  1.2*  1.1*   PHOS  3.2  2.8       All pertinent labs within the past 24 hours have been reviewed.    Significant Imaging: I have reviewed all pertinent imaging results/findings within the past 24 hours.    Assessment/Plan:     Active Diagnoses:    Diagnosis Date Noted POA    PRINCIPAL PROBLEM:  Appendicitis with peritonitis [K35.3] 08/26/2018 Yes    NIKHIL (acute kidney injury) [N17.9]  Unknown    Elevated troponin [R74.8]  Yes    Altered mental status [R41.82] 08/23/2018 Yes    DM (diabetes mellitus) [E11.9] 08/23/2018 Yes    HTN (hypertension) [I10] 08/23/2018 Yes    CAD (coronary artery disease) [I25.10] 08/23/2018 Yes    Closed compression fracture of fourth lumbar vertebra [S32.040A] 08/23/2018 Yes     Opiate use [F11.90] 08/23/2018 Yes    Slurred speech [R47.81] 08/23/2018 Yes    CVA, old, hemiparesis [I69.359] 08/23/2018 Not Applicable    CVA (cerebral vascular accident) [I63.9] 08/23/2018 Yes      Problems Resolved During this Admission:     CNS:  Appears grossly normal with normal range of motion and appropriate interaction with daughter and with writing on white board.  - d/c precedex in attempt to improve tidal volume to extubate later tonight.    Respiratory:  History of COPD and asthma. Also what sounds to be STELLA per family description. Previously on 100% FiO2 which may have contributed to respiratory depression.  - keep FiO2 as low as possible to target sats of 88-94%  - ABG in 30 minutes  - will need to extubate to BIPAP  - reportedly wears 2L o2 at night at baseline with CPAP.  Continue here after BIPAP weaned.  - avoid oversedation.  Try to use non-narcotic meds for pain control (tylenol)  - incentive spirometry  - duonebs q4hr scheduled.  - if persistent wheezing, may consider addition of steroids.  - post-operative film with possible pulm edema, consider diuresis if no improvement.    CV:  Stable  - continue current management per primary.    ABD:  S/p appendectomy  - post-operative care per surgery.    :  - NIKHIL present at admission, take care in pain control with NSAIDS.  + 800cc in surgery, consider diuresis if no improvement.    Endo:  - insulin sliding scale    ID:   - vanc zosyn, continue and follow cultures to narrow accordingly.        Critical secondary to Patient has a condition that poses threat to life and bodily function: Severe Respiratory Distress     Critical care was time spent personally by me on the following activities: development of treatment plan with patient or surrogate and bedside caregivers, discussions with consultants, evaluation of patient's response to treatment, examination of patient, ordering and performing treatments and interventions, ordering and review of  laboratory studies, ordering and review of radiographic studies, pulse oximetry, re-evaluation of patient's condition.  This critical care time did not overlap with that of any other provider or involve time for any procedures.    Thank you for your consult. I will follow-up with patient. Please contact us if you have any additional questions.     Froylan Shahid MD  Critical Care - Medicine  [facility]

## 2018-08-26 NOTE — PLAN OF CARE
Problem: Patient Care Overview  Goal: Plan of Care Review  Pt going to surgery thin morning for a lap appendectomy.  Consent signed, chlorhexadine bath and shave done.  Dr ordered and peripheral site but pt had port to L chest wall. To confirm with dr Pardo regarding peripheral IV.  Pt called daughter and expects daughter early today before surgery.      Tele: NSR, 80 HR,  No alarms.     Bed in lowest position, wheels locked, non skid socks, ID band worn, personal items and call bell with in reach, bed alarm set.

## 2018-08-26 NOTE — EICU
Called to bedside for emergent intubation.  Per bedside RN, pt arrived to bedside at noon from PACU.  Had been extubated in PACU.  On arrival to ICU was in respiratory distress requiring emergent re-intubation.  Bedside confirms color change to ETCO2 detector.  Bilat breath sounds auscultated.  OGT placed.  PCXR ordered.

## 2018-08-26 NOTE — ANESTHESIA PREPROCEDURE EVALUATION
08/26/2018  Sheryl Martines is a 63 y.o., female for [facility] appy    Review of patient's allergies indicates:   Allergen Reactions    Ace inhibitors Swelling    Corticosteroids (glucocorticoids)     Hydralazine analogues     Tetracyclines Swelling    Travatan (with benzalkonium) [travoprost (benzalkonium)]      Wt Readings from Last 3 Encounters:   08/23/18 80.9 kg (178 lb 6 oz)   08/14/18 81.6 kg (180 lb)     Temp Readings from Last 3 Encounters:   08/26/18 36.9 °C (98.5 °F) (Oral)   08/14/18 36.2 °C (97.1 °F) (Oral)     BP Readings from Last 3 Encounters:   08/26/18 128/60   08/14/18 (!) 164/78     Pulse Readings from Last 3 Encounters:   08/26/18 86   08/14/18 91     Past Medical History:   Diagnosis Date    Asthma     Closed compression fracture of fourth lumbar vertebra     COPD (chronic obstructive pulmonary disease)     Coronary artery disease     Diabetes mellitus     Glaucoma     High cholesterol     Hypertension     Iritis     Pulmonary embolus     Stroke     rt sided weakness.     Past Surgical History:   Procedure Laterality Date    ABDOMINAL SURGERY      BACK SURGERY      stimulator    CATARACT EXTRACTION      HYSTERECTOMY       Patient Active Problem List   Diagnosis    Altered mental status    DM (diabetes mellitus)    HTN (hypertension)    CAD (coronary artery disease)    Closed compression fracture of fourth lumbar vertebra    Opiate use    Slurred speech    CVA, old, hemiparesis    CVA (cerebral vascular accident)    NIKHIL (acute kidney injury)    Elevated troponin       Anesthesia Evaluation    I have reviewed the Patient Summary Reports.     I have reviewed the Medications.     Review of Systems  Anesthesia Hx:  No problems with previous Anesthesia Hx of Anesthetic complications  History of prior surgery of interest to airway management or planning: Personal Hx of  Anesthesia complications  Difficult Intubation Slow To Awaken/Delayed Emergence   Social:  Non-Smoker, No Alcohol Use    Hematology/Oncology:         -- Anemia:   Cardiovascular:   Hypertension ECG has been reviewed. TTE 8/23/18  CONCLUSIONS     1 - Concentric remodeling.     2 - No wall motion abnormalities.     3 - Normal left ventricular systolic function (EF 60-65%).     4 - Impaired LV relaxation, normal LAP (grade 1 diastolic dysfunction).     5 - Normal right ventricular systolic function .     6 - The estimated PA systolic pressure is 14 mmHg.     7 - No evidence of intracardiac shunt.     Taking Plavix    Carotid U/S  Limited study by body habitus.    Allowing for limitation, 50-69% right ICA stenosis with less than 50% left ICA stenosis.    Antegrade vertebral arteries bilaterally..   Pulmonary:   COPD Asthma moderate PRN O2 at bedtime.    Renal/:   Chronic Renal Disease, ARF    Hepatic/GI:  Hepatic/GI Normal appendicitis   Musculoskeletal:  Spine Disorders: lumbar    Neurological:   CVA (old) Baseline R sided weakness   Endocrine:   Diabetes, type 2, using insulin    Psych:  Psychiatric Normal           Physical Exam  General:  Obesity    Airway/Jaw/Neck:  Airway Findings: Mouth Opening: Small, but > 3cm Mallampati: IV  TM Distance: Normal, at least 6 cm  Jaw/Neck Findings:  Neck ROM: Normal ROM      Dental:  Dental Findings: In tact   Chest/Lungs:  Chest/Lungs Findings: Expiratory Wheezes, Mild     Heart/Vascular:  Heart Findings: Rate: Normal  Rhythm: Regular Rhythm  Sounds: Normal        Mental Status:  Mental Status Findings:  Alert and Oriented, Cooperative       Lab Results   Component Value Date    WBC 11.66 08/26/2018    HGB 7.9 (L) 08/26/2018    HCT 27.4 (L) 08/26/2018     08/26/2018    CHOL 159 08/23/2018    TRIG 187 (H) 08/23/2018    HDL 35 (L) 08/23/2018    ALT 16 08/26/2018    AST 22 08/26/2018     08/26/2018    K 3.4 (L) 08/26/2018    CL 95 08/26/2018    CREATININE 1.1  08/26/2018    BUN 10 08/26/2018    CO2 34 (H) 08/26/2018    TSH 0.902 08/22/2018    INR 0.9 08/23/2018    HGBA1C 8.1 (H) 08/23/2018     Wt Readings from Last 3 Encounters:   08/23/18 80.9 kg (178 lb 6 oz)   08/14/18 81.6 kg (180 lb)     Temp Readings from Last 3 Encounters:   08/26/18 37.2 °C (98.9 °F) (Oral)   08/14/18 36.2 °C (97.1 °F) (Oral)     BP Readings from Last 3 Encounters:   08/26/18 (!) 118/58   08/14/18 (!) 164/78     Pulse Readings from Last 3 Encounters:   08/26/18 80   08/14/18 91         Anesthesia Plan  Type of Anesthesia, risks & benefits discussed:  Anesthesia Type:  general  Patient's Preference:   Intra-op Monitoring Plan: standard ASA monitors  Intra-op Monitoring Plan Comments:   Post Op Pain Control Plan: multimodal analgesia  Post Op Pain Control Plan Comments:   Induction:   IV and Inhalation  Beta Blocker:  Patient is not currently on a Beta-Blocker (No further documentation required).       Informed Consent: Patient understands risks and agrees with Anesthesia plan.  Questions answered. Anesthesia consent signed with patient.  ASA Score: 3  emergent   Day of Surgery Review of History & Physical: I have interviewed and examined the patient. I have reviewed the patient's H&P dated:  There are no significant changes.  H&P update referred to the surgeon.  H&P completed by Anesthesiologist.       Ready For Surgery From Anesthesia Perspective.

## 2018-08-26 NOTE — PLAN OF CARE
Problem: Patient Care Overview  Goal: Plan of Care Review  Outcome: Ongoing (interventions implemented as appropriate)  Plan of care reviewed with the patient and daughter. Verbalized clear understanding. Bed alarm set. Bed in lowest position. Pt remain afebrile and free of fall. Call light within reach. Instructed pt to call when getting out of bed. Complaints of discomfort/pain on her back and abdomen, given prn tylenol, tramadol and offered heating packs. NSR HR 90's on telemetry monitor. IV antibiotics given. No report of SOB or lightheadedness. Will continue to monitor.

## 2018-08-26 NOTE — PROGRESS NOTES
Progress Note                                                      [facility]     Hospital Day 3    Overnight: No acute events    Subjective: .   Patient was examined at bedside this AM. Patient states that she has significant right lower quadrant abdominal pain that she rates asa 8/10, non radiating and sharp. Patient understands that she will be going to surgery today with Dr. Melendrez. Denies any fevers, chills,chest pain, sob, changes in bowel movements and urination. Admits to nausea and on going back pain as previously described.     Scheduled Meds:   albuterol-ipratropium  3 mL Nebulization Q4H    aspirin  81 mg Oral Daily    atorvastatin  40 mg Oral Daily    cloNIDine  0.1 mg Oral BID    clopidogrel  75 mg Oral Daily    diclofenac sodium   Topical (Top) Daily    diltiaZEM  180 mg Oral Daily    enoxaparin  40 mg Subcutaneous Daily    furosemide  40 mg Oral BID    lidocaine  1 patch Transdermal Q24H    metronidazole  500 mg Intravenous Q8H    piperacillin-tazobactam (ZOSYN) IVPB  4.5 g Intravenous Q8H    sodium chloride 0.9%  3 mL Intravenous Q8H    tobramycin-dexamethasone 0.3-0.1%  1 drop Both Eyes Q4H While awake    vancomycin in dextrose 5 %  1 g Intravenous Q24H     Continuous Infusions:    PRN Meds:acetaminophen, albuterol, albuterol-ipratropium, artificial tears, ceFAZolin (ANCEF) IVPB, dextrose 50%, glucagon (human recombinant), insulin aspart U-100, labetalol, loperamide, ondansetron, traMADol    Review of patient's allergies indicates:   Allergen Reactions    Ace inhibitors Swelling    Corticosteroids (glucocorticoids)     Hydralazine analogues     Tetracyclines Swelling    Travatan (with benzalkonium) [travoprost (benzalkonium)]        Objectives:     Vitals(Most Recent)      BP  Min: 117/56  Max: 148/67  Temp  Av.5 °F (36.9 °C)  Min: 96 °F (35.6 °C)  Max: 100.8 °F (38.2 °C)  Pulse  Av.3  Min: 77  Max: 104  Resp  Av.9  Min: 18   Max: 24  SpO2  Av.1 %  Min: 84 %  Max: 100 %             Vitals([id number])  Temp:  [96 °F (35.6 °C)-100.8 °F (38.2 °C)]   Pulse:  []   Resp:  [18-24]   BP: (117-148)/(55-68)   SpO2:  [84 %-100 %]     I & O([id number])    Intake/Output Summary (Last 24 hours) at 2018 0357  Last data filed at 2018 0100  Gross per 24 hour   Intake --   Output 1293 ml   Net -1293 ml     Constitutional: Patient is oriented to person, place, and time  HENT: Head: Normocephalic   Eyes: Dry skin on eyelids.  EOM are normal. Pupils are equal, round, and reactive to light. Neck: Normal range of motion. Neck supple. No JVD present. No tracheal deviation present.   Cardiovascular: Tachycardic, regular rhythm, normal heart sounds and intact distal pulses.  Exam reveals no gallop and no friction rub.  No murmur heard.  Pulmonary/Chest: Effort normal and breath sounds normal. No respiratory distress.   Abdominal: Soft. Bowel sounds are normal. Distended. Tender to palpation in RLQ. Rebound tenderness.   Musculoskeletal: Normal range of motion. Patient exhibits no edema.  TTP of sacral region. ROM limited due to pain.   Skin: Skin is warm and dry. No rash noted. No erythema. No pallor.   Psychiatric: unable to ascess   Neuro: patient not participating in exam, difficult to obtain    LABS  CBC  Recent Labs   Lab  18   2148  18   0452  18   0625   WBC  10.48  17.16*  13.55*  13.55*   RBC  3.99*  4.15  3.69*  3.69*   HGB  9.4*  9.7*  8.6*  8.6*   HCT  32.4*  33.1*  29.2*  29.2*   PLT  246  276  257  257   MCV  81*  80*  79*  79*   MCH  23.6*  23.4*  23.3*  23.3*   MCHC  29.0*  29.3*  29.5*  29.5*     BMP  Recent Labs   Lab  18   0452  18   1206  18   0624   NA  137  135*  137   K  3.2*  3.5  3.2*   CO2  31*  32*  32*   CL  93*  91*  95   BUN  16  20  16   CREATININE  1.3  1.7*  1.3   GLU  183*  238*  147*     POCT-Glucose  POCT Glucose   Date Value Ref Range Status   2018 217 (H) 70 -  110 mg/dL Final   08/25/2018 238 (H) 70 - 110 mg/dL Final   08/25/2018 153 (H) 70 - 110 mg/dL Final   08/25/2018 150 (H) 70 - 110 mg/dL Final   08/24/2018 183 (H) 70 - 110 mg/dL Final   08/24/2018 217 (H) 70 - 110 mg/dL Final   08/24/2018 230 (H) 70 - 110 mg/dL Final   08/24/2018 149 (H) 70 - 110 mg/dL Final   08/23/2018 193 (H) 70 - 110 mg/dL Final   08/23/2018 162 (H) 70 - 110 mg/dL Final   08/23/2018 172 (H) 70 - 110 mg/dL Final   08/23/2018 115 (H) 70 - 110 mg/dL Final       Recent Labs   Lab  08/23/18   0420  08/24/18   0452  08/24/18   1206  08/25/18   0624   CALCIUM  9.7  9.7  9.6  8.8   MG  1.6  1.2*   --   1.2*   PHOS  3.5  3.7   --   3.2     LFT  Recent Labs   Lab  08/23/18   0420  08/24/18   0452  08/25/18   0624   PROT  6.6  6.5  5.8*   ALBUMIN  2.6*  2.5*  2.2*   BILITOT  0.4  0.8  0.7   AST  19  17  22   ALKPHOS  97  105  108   ALT  18  13  17     COAGS  Recent Labs   Lab  08/23/18   0420   INR  0.9   APTT  29.0     CE  Recent Labs   Lab  08/23/18   0420  08/23/18   1930  08/25/18   0624   TROPONINI  0.267*  0.233*  0.098*     BNP  Recent Labs   Lab  08/23/18   0217   BNP  25     LAST HbA1c  Lab Results   Component Value Date    HGBA1C 8.1 (H) 08/23/2018     Imaging  Imaging Results          US Carotid Bilateral (Final result)  Result time 08/23/18 09:08:55    Final result by Marc Lemos DO (08/23/18 09:08:55)                 Impression:      Limited study by body habitus.    Allowing for limitation, 50-69% right ICA stenosis with less than 50% left ICA stenosis.    Antegrade vertebral arteries bilaterally..      Electronically signed by: Marc Lemos DO  Date:    08/23/2018  Time:    09:08             Narrative:    EXAMINATION:  US CAROTID BILATERAL    CLINICAL HISTORY:  stroke; Altered mental status, unspecified    TECHNIQUE:  Grayscale and color Doppler ultrasound examination of the carotid and vertebral artery systems bilaterally.    COMPARISON:  None.    FINDINGS:  Results: the right common  carotid artery is patent. The peak systolic velocities approximately 47 cm/sec which is within normal limits.  . The peak systolic velocities of the right proximal, mid and distal internal carotid artery are approximately 47, 78, and 178 cm/sec respectively this corresponds to an ICA/CCA ratio of approximately 3.7 which is within normal limits.  Please note evaluation limited by body habitus with difficulty penetrating within limits of the study increased velocity the concerning for 50-69% ICA stenosis.    The right vertebral artery is antegrade.    The left common carotid artery is patent. The peak systolic velocity is approximately 45 cm/sec which is within normal limits. Peak systolic velocity of the proximal, mid and distal left internal carotid arteries is approximately 38, 49, and 49 cm/sec respectively. This corresponds to an ICA/CCA ratio of approximately 1.1 which is within normal limits.    The left vertebral artery is antegrade.    Measurement of carotid stenosis is based on velocity parameters that correlate the residual internal carotid diameter with [place] symptomatic carotid endarterectomy trial (NASCET) - based stenosis levels.                               CT Head Without Contrast (Final result)  Result time 08/22/18 22:19:26    Final result by Darwin Cool MD (08/22/18 22:19:26)                 Impression:      1. Apparent punctate foci of parenchymal hypoattenuation within the bilateral thalami concerning for age indeterminate lacunar infarcts, presumably old.  Clinical correlation is recommended.      Electronically signed by: Darwin Cool MD  Date:    08/22/2018  Time:    22:19             Narrative:    EXAMINATION:  CT HEAD WITHOUT CONTRAST    CLINICAL HISTORY:  Confusion/delirium, altered LOC, unexplained;    TECHNIQUE:  5-mm axial images were obtained through the head without the use of contrast.  Coronal and sagittal reformats were  performed.    COMPARISON:  None.    FINDINGS:  Punctate foci of parenchymal hypoattenuation noted within the bilateral thalami concerning for age indeterminate lacunar infarcts, presumably old.  No CT findings to suggest an acute major vascular distribution infarct.  No intra or extra-axial hemorrhage.  No midline shift or mass effect.  No hydrocephalus.  Sellar region is unremarkable.    Paranasal sinuses and mastoid air cells are clear.  There is heterogeneity of the calvarium, possibly related to an underlying metabolic disorder.  Subcutaneous soft tissues are normal.                               X-Ray Chest 1 View (Final result)  Result time 08/22/18 22:21:33    Final result by Darwin Cool MD (08/22/18 22:21:33)                 Impression:      1. No acute radiographic findings in the chest on this single view.      Electronically signed by: Darwin Cool MD  Date:    08/22/2018  Time:    22:21             Narrative:    EXAMINATION:  XR CHEST 1 VIEW    CLINICAL HISTORY:  AMS;    TECHNIQUE:  Single frontal view of the chest was performed.    COMPARISON:  None    FINDINGS:  Partially visualized catheter projects to the right of midline, presumably over the SVC.  Spinal cord stimulator projects over the midline thorax.  Mediastinal structures are midline.  Cardiac silhouette is magnified by AP technique.  Lung volumes are symmetric.  No consolidation.  No pneumothorax or pleural effusions.  No free air beneath the diaphragm.  Degenerative changes of the shoulders noted.                                Micro:  Microbiology Results (last 7 days)     Procedure Component Value Units Date/Time    Urine culture [[id number]] Collected:  08/25/18 0628    Order Status:  Sent Specimen:  Urine, Clean Catch Updated:  08/25/18 0921    Blood culture [[id number]] Collected:  08/23/18 2041    Order Status:  Completed Specimen:  Blood Updated:  08/25/18 0612     Blood Culture, Routine No Growth to date     Blood Culture,  Routine No Growth to date    Blood culture [[id number]] Collected:  08/23/18 2041    Order Status:  Completed Specimen:  Blood Updated:  08/25/18 0612     Blood Culture, Routine No Growth to date     Blood Culture, Routine No Growth to date           Assessment/Plan:    63 y.o. 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 Patient now with fever last recorded 100.8. CT abd&Pelvis with contrast shows acute appendicitis vs. appendiceal abscess. Patient on Vanc and zosyn day 3 & Flagyl day 2. Surgery on board.      Acute Appendicitis vs. Appendiceal Abscess  - Blood cultures x 2 - Negative   -Lactate 1.9 - 8/24   -CT abd&pelvis with contrast - Acute appendicitis with distal appendiceal perforation vs. Gas-forming infection. Likely early abscess formation.  -Abx: Vancomycin 1g q12h and Zosyn 4.5g q8h day 3 and Flagyl 500mg q8h day 2  -Appendectomy via Laparoscopic vs open with Dr. Melendrez in [place]      CVA    CT head is negative for acute hemorrhagic event  Anti-platelet therapy: ASA 81/Plavix 75mg   Atorvastatin 40mg PO daily  Labs: B1 & B6 pending   Q4 nuero checks  PT/OT/ST  Fall precautions  Drug screen pos for opiates     Carotid Doppler ordered- Allowing for limitation, 50-69% right ICA stenosis with less than 50% left ICA stenosis.  Antegrade vertebral arteries bilaterally.    2D echo with bubble study- 1 - Concentric remodeling.     2 - No wall motion abnormalities.     3 - Normal left ventricular systolic function (EF 60-65%).     4 - Impaired LV relaxation, normal LAP (grade 1 diastolic dysfunction).     5 - Normal right ventricular systolic function .     6 - The estimated PA systolic pressure is 14 mmHg.     7 - No evidence of intracardiac shunt.     EEG performed- encephalopathy     CTA head and neck Unremarkable and without evidence for proximal significant stenosis or occlusion.     HTN  Clonidine .1mg PO BID  Lasix  40mg PO BID  Diltiazem 180mg ER PO daily      DM2  -POCT glu 217, 238 and 153 (8/25/18)  -Patient on medium dose SSI     CAD  - Continue ASA and plavix and Atorvastatin      Elevated troponin- resolving   - 0.273-->0.267--> 0.233--> .098       PT/OT: ordered and following  Code: full  Diet: regular  Ppx: dvt: lovenox   Dispo: Appendicitis vs Appendiceal abscess requiring IV Abx and likely surgical intervention     Bassam Tatum MD  [facility], PGY-1

## 2018-08-26 NOTE — PLAN OF CARE
Patient becoming increasingly tachycardic to 120s and breathing at 45 breaths per minute with low tidal volumes on pressure support 10/5 30%.  Saturations in the mid 90s.  Unfortunately it doesn't appear that the patient will tolerate this setting.  ABG obtained showed 7.389/59/123/35.7 but clinically she is unable to maintain these settings.      Will remain intubated overnight on propofol.  Will asess again in the morning with sedation holiday and SBT

## 2018-08-26 NOTE — OP NOTE
Operative Note       Surgery Date: 8/26/2018     Surgeon(s) and Role:     * Bernadine Melendrez MD - Primary    Pre-op Diagnosis:  Elevated troponin [R74.8]  Acute appendicitis with localized peritonitis [K35.3]  Altered mental status, unspecified altered mental status type [R41.82]  Type 2 diabetes mellitus with microalbuminuria, with long-term current use of insulin [E11.29, R80.9, Z79.4]  Hypertension, unspecified type [I10]    Post-op Diagnosis: Post-Op Diagnosis Codes:     * Elevated troponin [R74.8]     * Acute appendicitis with localized peritonitis [K35.3]     * Altered mental status, unspecified altered mental status type [R41.82]     * Type 2 diabetes mellitus with microalbuminuria, with long-term current use of insulin [E11.29, R80.9, Z79.4]     * Hypertension, unspecified type [I10]    Procedure(s) (LRB):  APPENDECTOMY, LAPAROSCOPIC (N/A) Attempted unsuccessful  Then open appendectomy done under general anaesthesia  tolerated well , intra op lot of adhesion  Anesthesia: General    Procedure in Detail/Findings:Attempted lap appendectomy , unsuccessful then open Appendectomy done under general anaesthesia, Patient tolerated well and was sent to recovery room in stable condition, lot of ahdesion intraop secondary to previous surgery      Estimated Blood Loss: 40        Specimens (From admission, onward)    Start     Ordered    08/26/18 1015  Specimen to Pathology - Surgery  Appendix   Start Status   08/26/18 1015 Collected (08/26/18 1015)       08/26/18 1015        Implants: * No implants in log *           Disposition: PACU - hemodynamically stable.           Condition: Good    Attestation:  I performed the procedure.           Discharge Note    Admit Date: 8/22/2018    Attending Physician: Cassy Wick MD     Discharge Physician: Cassy Wick MD    Final Diagnosis: Post-Op Diagnosis Codes:     * Elevated troponin [R74.8]     * Acute appendicitis with localized peritonitis [K35.3]     * Altered  mental status, unspecified altered mental status type [R41.82]     * Type 2 diabetes mellitus with microalbuminuria, with long-term current use of insulin [E11.29, R80.9, Z79.4]     * Hypertension, unspecified type [I10]    Disposition: Still a Patient    Patient Instructions:   Current Discharge Medication List      CONTINUE these medications which have NOT CHANGED    Details   albuterol (ACCUNEB) 0.63 mg/3 mL Nebu Take 0.63 mg by nebulization every 6 (six) hours as needed. Rescue      baclofen (LIORESAL) 10 MG tablet Take 10 mg by mouth 3 (three) times daily.      clopidogrel (PLAVIX) 75 mg tablet Take 75 mg by mouth once daily.      diltiaZEM (CARDIZEM CD) 180 MG 24 hr capsule Take 180 mg by mouth once daily.      DULoxetine (CYMBALTA) 60 MG capsule Take 60 mg by mouth once daily.      fluticasone-salmeterol 500-50 mcg/dose (ADVAIR DISKUS) 500-50 mcg/dose DsDv diskus inhaler Inhale 1 puff into the lungs 2 (two) times daily. Controller      furosemide (LASIX) 40 MG tablet Take 40 mg by mouth 2 (two) times daily.      gabapentin (NEURONTIN) 300 MG capsule Take 300 mg by mouth 3 (three) times daily.      insulin detemir U-100 (LEVEMIR) 100 unit/mL injection Inject into the skin every evening.      insulin lispro (HUMALOG PEN SUBQ) Inject into the skin.      lansoprazole (PREVACID) 30 MG capsule Take 30 mg by mouth once daily.      losartan potassium (COZAAR ORAL) Take by mouth.      oxyCODONE-acetaminophen (PERCOCET) 5-325 mg per tablet Take 1 tablet by mouth every 4 (four) hours as needed for Pain.  Qty: 18 tablet, Refills: 0      prazosin (MINIPRESS) 5 MG capsule Take 5 mg by mouth every evening.      predniSONE (DELTASONE) 10 MG tablet Take 10 mg by mouth once daily.      simvastatin (ZOCOR) 40 MG tablet Take 40 mg by mouth every evening.      sitagliptin phosphate (JANUVIA ORAL) Take by mouth.      theophylline (THEODUR) 200 MG 12 hr tablet Take 200 mg by mouth 2 (two) times daily.      theophylline anhydrous  (UNIPHYL ORAL) Take by mouth.             Patient tolerated well and was sent to recovery room in stable condition

## 2018-08-26 NOTE — CONSULTS
Today`s Date: 8/25/2018     Admit Date: 8/22/2018    Admitting Physician: Cassy Wick MD    Patient`s Name: Sheryl Martines , 63 y.o. female    Reason for consultation   Patient with acute appendicitis  , surgical evaluation . Patient admitted with possible stroke w/u revealed right Quadrant abscess with appendicitis  Patient Active Problem List:     Altered mental status     DM (diabetes mellitus)     HTN (hypertension)     CAD (coronary artery disease)     Closed compression fracture of fourth lumbar vertebra     Opiate use     Slurred speech     CVA, old, hemiparesis     CVA (cerebral vascular accident)     NIKHIL (acute kidney injury)     Elevated troponin      Past Medical History:  No date: Asthma  No date: Closed compression fracture of fourth lumbar vertebra  No date: COPD (chronic obstructive pulmonary disease)  No date: Coronary artery disease  No date: Diabetes mellitus  No date: Glaucoma  No date: High cholesterol  No date: Hypertension  No date: Iritis  No date: Pulmonary embolus  No date: Stroke      Comment:  rt sided weakness.    Past Surgical History:  No date: ABDOMINAL SURGERY  No date: BACK SURGERY      Comment:  stimulator  No date: CATARACT EXTRACTION  No date: HYSTERECTOMY    Prior to Admission medications :  Medication albuterol (ACCUNEB) 0.63 mg/3 mL Nebu, Sig Take 0.63 mg by nebulization every 6 (six) hours as needed. Rescue, Start Date , End Date , Taking? , Authorizing Provider Historical Provider, MD    Medication baclofen (LIORESAL) 10 MG tablet, Sig Take 10 mg by mouth 3 (three) times daily., Start Date , End Date , Taking? , Authorizing Provider Historical Provider, MD    Medication clopidogrel (PLAVIX) 75 mg tablet, Sig Take 75 mg by mouth once daily., Start Date , End Date , Taking? , Authorizing Provider Historical Provider, MD    Medication diltiaZEM (CARDIZEM CD) 180 MG 24 hr capsule, Sig Take 180 mg by mouth once daily., Start Date , End Date , Taking? , Authorizing Provider  Historical Provider, MD    Medication DULoxetine (CYMBALTA) 60 MG capsule, Sig Take 60 mg by mouth once daily., Start Date , End Date , Taking? , Authorizing Provider Historical MD Wanda    Medication fluticasone-salmeterol 500-50 mcg/dose (ADVAIR DISKUS) 500-50 mcg/dose DsDv diskus inhaler, Sig Inhale 1 puff into the lungs 2 (two) times daily. Controller, Start Date , End Date , Taking? , Authorizing Provider Historical Provider, MD    Medication furosemide (LASIX) 40 MG tablet, Sig Take 40 mg by mouth 2 (two) times daily., Start Date , End Date , Taking? , Authorizing Provider Historical MD Wanda    Medication gabapentin (NEURONTIN) 300 MG capsule, Sig Take 300 mg by mouth 3 (three) times daily., Start Date , End Date , Taking? , Authorizing Provider Historical MD Wanda    Medication insulin detemir U-100 (LEVEMIR) 100 unit/mL injection, Sig Inject into the skin every evening., Start Date , End Date , Taking? , Authorizing Provider Historical MD Wanda    Medication insulin lispro (HUMALOG PEN SUBQ), Sig Inject into the skin., Start Date , End Date , Taking? , Authorizing Provider Historical MD Wanda    Medication lansoprazole (PREVACID) 30 MG capsule, Sig Take 30 mg by mouth once daily., Start Date , End Date , Taking? , Authorizing Provider Historical MD Wanda    Medication losartan potassium (COZAAR ORAL), Sig Take by mouth., Start Date , End Date , Taking? , Authorizing Provider Historical MD Wanda    Medication oxyCODONE-acetaminophen (PERCOCET) 5-325 mg per tablet, Sig Take 1 tablet by mouth every 4 (four) hours as needed for Pain., Start Date 8/14/18, End Date , Taking? , Authorizing Provider Aaron Jose MD    Medication prazosin (MINIPRESS) 5 MG capsule, Sig Take 5 mg by mouth every evening., Start Date , End Date , Taking? , Authorizing Provider Papo Muñoz MD    Medication predniSONE (DELTASONE) 10 MG tablet, Sig Take 10 mg by mouth once daily., Start Date , End  Date , Taking? , Authorizing Provider Historical Provider, MD    Medication simvastatin (ZOCOR) 40 MG tablet, Sig Take 40 mg by mouth every evening., Start Date , End Date , Taking? , Authorizing Provider Historical Provider, MD    Medication sitagliptin phosphate (JANUVIA ORAL), Sig Take by mouth., Start Date , End Date , Taking? , Authorizing Provider Historical MD Wanda    Medication theophylline (THEODUR) 200 MG 12 hr tablet, Sig Take 200 mg by mouth 2 (two) times daily., Start Date , End Date , Taking? , Authorizing Provider Historical Provider, MD    Medication theophylline anhydrous (UNIPHYL ORAL), Sig Take by mouth., Start Date , End Date , Taking? , Authorizing Provider Historical MD Wanda      No current facility-administered medications on file prior to encounter.   Current Outpatient Medications on File Prior to Encounter:  albuterol (ACCUNEB) 0.63 mg/3 mL Nebu, Take 0.63 mg by nebulization every 6 (six) hours as needed. Rescue, Disp: , Rfl:   baclofen (LIORESAL) 10 MG tablet, Take 10 mg by mouth 3 (three) times daily., Disp: , Rfl:   clopidogrel (PLAVIX) 75 mg tablet, Take 75 mg by mouth once daily., Disp: , Rfl:   diltiaZEM (CARDIZEM CD) 180 MG 24 hr capsule, Take 180 mg by mouth once daily., Disp: , Rfl:   DULoxetine (CYMBALTA) 60 MG capsule, Take 60 mg by mouth once daily., Disp: , Rfl:   fluticasone-salmeterol 500-50 mcg/dose (ADVAIR DISKUS) 500-50 mcg/dose DsDv diskus inhaler, Inhale 1 puff into the lungs 2 (two) times daily. Controller, Disp: , Rfl:   furosemide (LASIX) 40 MG tablet, Take 40 mg by mouth 2 (two) times daily., Disp: , Rfl:   gabapentin (NEURONTIN) 300 MG capsule, Take 300 mg by mouth 3 (three) times daily., Disp: , Rfl:   insulin detemir U-100 (LEVEMIR) 100 unit/mL injection, Inject into the skin every evening., Disp: , Rfl:   insulin lispro (HUMALOG PEN SUBQ), Inject into the skin., Disp: , Rfl:   lansoprazole (PREVACID) 30 MG capsule, Take 30 mg by mouth once daily., Disp:  , Rfl:   losartan potassium (COZAAR ORAL), Take by mouth., Disp: , Rfl:   oxyCODONE-acetaminophen (PERCOCET) 5-325 mg per tablet, Take 1 tablet by mouth every 4 (four) hours as needed for Pain., Disp: 18 tablet, Rfl: 0  prazosin (MINIPRESS) 5 MG capsule, Take 5 mg by mouth every evening., Disp: , Rfl:   predniSONE (DELTASONE) 10 MG tablet, Take 10 mg by mouth once daily., Disp: , Rfl:   simvastatin (ZOCOR) 40 MG tablet, Take 40 mg by mouth every evening., Disp: , Rfl:   sitagliptin phosphate (JANUVIA ORAL), Take by mouth., Disp: , Rfl:   theophylline (THEODUR) 200 MG 12 hr tablet, Take 200 mg by mouth 2 (two) times daily., Disp: , Rfl:   theophylline anhydrous (UNIPHYL ORAL), Take by mouth., Disp: , Rfl:          Review of patient's allergies indicates:   -- Ace inhibitors -- Swelling   -- Corticosteroids (glucocorticoids)    -- Hydralazine analogues    -- Tetracyclines -- Swelling   -- Travatan (with benzalkonium) [travoprost (benzalkonium)]     Social History:   reports that  has never smoked. She does not have any smokeless tobacco history on file. She reports that she does not drink alcohol or use drugs.     History reviewed.  No pertinent family history.      PHYSICAL EXAMINATION  Temp:  [96 °F (35.6 °C)-100.8 °F (38.2 °C)] 99.2 °F (37.3 °C)  Pulse:  [] 86  Resp:  [18-24] 20  SpO2:  [84 %-100 %] 100 %  BP: (117-148)/(55-68) 119/57    General Condition:   elderly female . Alert x 3 , c/o right sided abdominal pain.    Head & Neck  Anemia: None  Jaundice: None  Neck vein: Not distended  Carotid Bruits: none  Lymph nodes: none palpable  Thyroid: normal    Chest: normal    Heart: normal    Rt. Breast: not examined  Lt. Breast: not examined  Axillary lymph nodes: none    Abdomen: Soft,   tender with localized tenderness right lower quadrant , no rebound or rigidity  Hernia: none    Rectal: Defered    Extremities: normal    Vascular: normal    Specific focus Examination    Imp: acute appendicitis , DM,S/p  stroke in the past , h/o fall , fx vertebra    Plan: lap appendectomy in am possible open.

## 2018-08-26 NOTE — PLAN OF CARE
Problem: Patient Care Overview  Goal: Plan of Care Review  Outcome: Ongoing (interventions implemented as appropriate)  Pt received on NC at 2 LPM. Pt SpO2 98 %. No respiratory distress. Will continue to monitor.

## 2018-08-27 ENCOUNTER — ANESTHESIA EVENT (OUTPATIENT)
Dept: INTENSIVE CARE | Facility: HOSPITAL | Age: 63
DRG: 853 | End: 2018-08-27
Payer: MEDICARE

## 2018-08-27 ENCOUNTER — ANESTHESIA (OUTPATIENT)
Dept: INTENSIVE CARE | Facility: HOSPITAL | Age: 63
DRG: 853 | End: 2018-08-27
Payer: MEDICARE

## 2018-08-27 LAB
ABO + RH BLD: NORMAL
ALBUMIN SERPL BCP-MCNC: 2.2 G/DL
ALBUMIN SERPL BCP-MCNC: 2.3 G/DL
ALLENS TEST: ABNORMAL
ALLENS TEST: ABNORMAL
ALP SERPL-CCNC: 100 U/L
ALP SERPL-CCNC: 114 U/L
ALT SERPL W/O P-5'-P-CCNC: 15 U/L
ALT SERPL W/O P-5'-P-CCNC: 17 U/L
ANION GAP SERPL CALC-SCNC: 7 MMOL/L
ANION GAP SERPL CALC-SCNC: 7 MMOL/L
ANISOCYTOSIS BLD QL SMEAR: SLIGHT
ANISOCYTOSIS BLD QL SMEAR: SLIGHT
AST SERPL-CCNC: 24 U/L
AST SERPL-CCNC: 38 U/L
BASOPHILS # BLD AUTO: 0.02 K/UL
BASOPHILS # BLD AUTO: ABNORMAL K/UL
BASOPHILS NFR BLD: 0 %
BASOPHILS NFR BLD: 0.1 %
BILIRUB SERPL-MCNC: 0.7 MG/DL
BILIRUB SERPL-MCNC: 2 MG/DL
BLD GP AB SCN CELLS X3 SERPL QL: NORMAL
BLD PROD TYP BPU: NORMAL
BLOOD UNIT EXPIRATION DATE: NORMAL
BLOOD UNIT TYPE CODE: 6200
BLOOD UNIT TYPE: NORMAL
BUN SERPL-MCNC: 11 MG/DL
BUN SERPL-MCNC: 11 MG/DL
CALCIUM SERPL-MCNC: 7.9 MG/DL
CALCIUM SERPL-MCNC: 8.4 MG/DL
CHLORIDE SERPL-SCNC: 98 MMOL/L
CHLORIDE SERPL-SCNC: 99 MMOL/L
CO2 SERPL-SCNC: 29 MMOL/L
CO2 SERPL-SCNC: 29 MMOL/L
CODING SYSTEM: NORMAL
CREAT SERPL-MCNC: 1.9 MG/DL
CREAT SERPL-MCNC: 2.2 MG/DL
DELSYS: ABNORMAL
DELSYS: ABNORMAL
DIFFERENTIAL METHOD: ABNORMAL
DIFFERENTIAL METHOD: ABNORMAL
DISPENSE STATUS: NORMAL
EOSINOPHIL # BLD AUTO: 0 K/UL
EOSINOPHIL # BLD AUTO: ABNORMAL K/UL
EOSINOPHIL NFR BLD: 0 %
EOSINOPHIL NFR BLD: 0 %
ERYTHROCYTE [DISTWIDTH] IN BLOOD BY AUTOMATED COUNT: 16 %
ERYTHROCYTE [DISTWIDTH] IN BLOOD BY AUTOMATED COUNT: 16.1 %
ERYTHROCYTE [SEDIMENTATION RATE] IN BLOOD BY WESTERGREN METHOD: 14 MM/H
ERYTHROCYTE [SEDIMENTATION RATE] IN BLOOD BY WESTERGREN METHOD: 14 MM/H
EST. GFR  (AFRICAN AMERICAN): 27 ML/MIN/1.73 M^2
EST. GFR  (AFRICAN AMERICAN): 32 ML/MIN/1.73 M^2
EST. GFR  (NON AFRICAN AMERICAN): 23 ML/MIN/1.73 M^2
EST. GFR  (NON AFRICAN AMERICAN): 28 ML/MIN/1.73 M^2
FIO2: 60
FIO2: 60
GLUCOSE SERPL-MCNC: 128 MG/DL
GLUCOSE SERPL-MCNC: 183 MG/DL
HCO3 UR-SCNC: 28.8 MMOL/L (ref 24–28)
HCO3 UR-SCNC: 30.3 MMOL/L (ref 24–28)
HCT VFR BLD AUTO: 23.7 %
HCT VFR BLD AUTO: 28.8 %
HGB BLD-MCNC: 6.8 G/DL
HGB BLD-MCNC: 6.8 G/DL
HGB BLD-MCNC: 8.6 G/DL
HYPOCHROMIA BLD QL SMEAR: ABNORMAL
HYPOCHROMIA BLD QL SMEAR: ABNORMAL
LACTATE SERPL-SCNC: 1.1 MMOL/L
LYMPHOCYTES # BLD AUTO: 1.2 K/UL
LYMPHOCYTES # BLD AUTO: ABNORMAL K/UL
LYMPHOCYTES NFR BLD: 4.2 %
LYMPHOCYTES NFR BLD: 7 %
MAGNESIUM SERPL-MCNC: 0.9 MG/DL
MAGNESIUM SERPL-MCNC: 1.7 MG/DL
MCH RBC QN AUTO: 23 PG
MCH RBC QN AUTO: 24.2 PG
MCHC RBC AUTO-ENTMCNC: 28.7 G/DL
MCHC RBC AUTO-ENTMCNC: 29.9 G/DL
MCV RBC AUTO: 80 FL
MCV RBC AUTO: 81 FL
MODE: ABNORMAL
MODE: ABNORMAL
MONOCYTES # BLD AUTO: 1.3 K/UL
MONOCYTES # BLD AUTO: ABNORMAL K/UL
MONOCYTES NFR BLD: 0 %
MONOCYTES NFR BLD: 4.3 %
NEUTROPHILS # BLD AUTO: 26.4 K/UL
NEUTROPHILS NFR BLD: 59 %
NEUTROPHILS NFR BLD: 91.4 %
NEUTS BAND NFR BLD MANUAL: 34 %
PCO2 BLDA: 44.3 MMHG (ref 35–45)
PCO2 BLDA: 46.9 MMHG (ref 35–45)
PEEP: 5
PEEP: 5
PH SMN: 7.42 [PH] (ref 7.35–7.45)
PH SMN: 7.42 [PH] (ref 7.35–7.45)
PHOSPHATE SERPL-MCNC: 2.8 MG/DL
PLATELET # BLD AUTO: 270 K/UL
PLATELET # BLD AUTO: 288 K/UL
PLATELET BLD QL SMEAR: ABNORMAL
PLATELET BLD QL SMEAR: ABNORMAL
PMV BLD AUTO: 9.1 FL
PMV BLD AUTO: 9.2 FL
PO2 BLDA: 191 MMHG (ref 80–100)
PO2 BLDA: 89 MMHG (ref 80–100)
POC BE: 4 MMOL/L
POC BE: 6 MMOL/L
POC SATURATED O2: 100 % (ref 95–100)
POC SATURATED O2: 97 % (ref 95–100)
POC TCO2: 30 MMOL/L (ref 23–27)
POC TCO2: 32 MMOL/L (ref 23–27)
POCT GLUCOSE: 136 MG/DL (ref 70–110)
POCT GLUCOSE: 143 MG/DL (ref 70–110)
POCT GLUCOSE: 173 MG/DL (ref 70–110)
POCT GLUCOSE: 188 MG/DL (ref 70–110)
POIKILOCYTOSIS BLD QL SMEAR: SLIGHT
POIKILOCYTOSIS BLD QL SMEAR: SLIGHT
POLYCHROMASIA BLD QL SMEAR: ABNORMAL
POTASSIUM SERPL-SCNC: 4 MMOL/L
POTASSIUM SERPL-SCNC: 4.2 MMOL/L
PROT SERPL-MCNC: 5.2 G/DL
PROT SERPL-MCNC: 5.8 G/DL
PYRIDOXAL SERPL-MCNC: 7 UG/L (ref 5–50)
RBC # BLD AUTO: 2.96 M/UL
RBC # BLD AUTO: 3.56 M/UL
SAMPLE: ABNORMAL
SAMPLE: ABNORMAL
SITE: ABNORMAL
SITE: ABNORMAL
SODIUM SERPL-SCNC: 134 MMOL/L
SODIUM SERPL-SCNC: 135 MMOL/L
TRANS ERYTHROCYTES VOL PATIENT: NORMAL ML
VANCOMYCIN SERPL-MCNC: 12.2 UG/ML
VIT B1 SERPL-MCNC: 48 UG/L (ref 38–122)
VT: 450
VT: 450
WBC # BLD AUTO: 19.56 K/UL
WBC # BLD AUTO: 29.02 K/UL

## 2018-08-27 PROCEDURE — 94761 N-INVAS EAR/PLS OXIMETRY MLT: CPT

## 2018-08-27 PROCEDURE — 36430 TRANSFUSION BLD/BLD COMPNT: CPT

## 2018-08-27 PROCEDURE — 63600175 PHARM REV CODE 636 W HCPCS: Performed by: FAMILY MEDICINE

## 2018-08-27 PROCEDURE — 36600 WITHDRAWAL OF ARTERIAL BLOOD: CPT

## 2018-08-27 PROCEDURE — 87040 BLOOD CULTURE FOR BACTERIA: CPT | Mod: 59

## 2018-08-27 PROCEDURE — 86920 COMPATIBILITY TEST SPIN: CPT

## 2018-08-27 PROCEDURE — 80202 ASSAY OF VANCOMYCIN: CPT

## 2018-08-27 PROCEDURE — A4216 STERILE WATER/SALINE, 10 ML: HCPCS | Performed by: STUDENT IN AN ORGANIZED HEALTH CARE EDUCATION/TRAINING PROGRAM

## 2018-08-27 PROCEDURE — 94660 CPAP INITIATION&MGMT: CPT

## 2018-08-27 PROCEDURE — 94640 AIRWAY INHALATION TREATMENT: CPT

## 2018-08-27 PROCEDURE — 63600175 PHARM REV CODE 636 W HCPCS: Performed by: STUDENT IN AN ORGANIZED HEALTH CARE EDUCATION/TRAINING PROGRAM

## 2018-08-27 PROCEDURE — P9021 RED BLOOD CELLS UNIT: HCPCS

## 2018-08-27 PROCEDURE — 83735 ASSAY OF MAGNESIUM: CPT

## 2018-08-27 PROCEDURE — 25000003 PHARM REV CODE 250: Performed by: OPHTHALMOLOGY

## 2018-08-27 PROCEDURE — 82803 BLOOD GASES ANY COMBINATION: CPT

## 2018-08-27 PROCEDURE — 97164 PT RE-EVAL EST PLAN CARE: CPT

## 2018-08-27 PROCEDURE — 25000003 PHARM REV CODE 250: Performed by: STUDENT IN AN ORGANIZED HEALTH CARE EDUCATION/TRAINING PROGRAM

## 2018-08-27 PROCEDURE — 80053 COMPREHEN METABOLIC PANEL: CPT

## 2018-08-27 PROCEDURE — G8978 MOBILITY CURRENT STATUS: HCPCS | Mod: CM

## 2018-08-27 PROCEDURE — S0030 INJECTION, METRONIDAZOLE: HCPCS | Performed by: HOSPITALIST

## 2018-08-27 PROCEDURE — 97803 MED NUTRITION INDIV SUBSEQ: CPT

## 2018-08-27 PROCEDURE — 83605 ASSAY OF LACTIC ACID: CPT

## 2018-08-27 PROCEDURE — 87070 CULTURE OTHR SPECIMN AEROBIC: CPT

## 2018-08-27 PROCEDURE — 94799 UNLISTED PULMONARY SVC/PX: CPT

## 2018-08-27 PROCEDURE — 25000003 PHARM REV CODE 250: Performed by: ANESTHESIOLOGY

## 2018-08-27 PROCEDURE — 99900035 HC TECH TIME PER 15 MIN (STAT)

## 2018-08-27 PROCEDURE — 63600175 PHARM REV CODE 636 W HCPCS: Performed by: ANESTHESIOLOGY

## 2018-08-27 PROCEDURE — 99900026 HC AIRWAY MAINTENANCE (STAT)

## 2018-08-27 PROCEDURE — 20000000 HC ICU ROOM

## 2018-08-27 PROCEDURE — 25000003 PHARM REV CODE 250: Performed by: INTERNAL MEDICINE

## 2018-08-27 PROCEDURE — 76937 US GUIDE VASCULAR ACCESS: CPT | Performed by: ANESTHESIOLOGY

## 2018-08-27 PROCEDURE — 86901 BLOOD TYPING SEROLOGIC RH(D): CPT

## 2018-08-27 PROCEDURE — 25000003 PHARM REV CODE 250: Performed by: HOSPITALIST

## 2018-08-27 PROCEDURE — 87205 SMEAR GRAM STAIN: CPT

## 2018-08-27 PROCEDURE — 25000003 PHARM REV CODE 250: Performed by: FAMILY MEDICINE

## 2018-08-27 PROCEDURE — 97168 OT RE-EVAL EST PLAN CARE: CPT

## 2018-08-27 PROCEDURE — 85027 COMPLETE CBC AUTOMATED: CPT

## 2018-08-27 PROCEDURE — 80053 COMPREHEN METABOLIC PANEL: CPT | Mod: 91

## 2018-08-27 PROCEDURE — 84100 ASSAY OF PHOSPHORUS: CPT

## 2018-08-27 PROCEDURE — 85018 HEMOGLOBIN: CPT

## 2018-08-27 PROCEDURE — 85007 BL SMEAR W/DIFF WBC COUNT: CPT

## 2018-08-27 PROCEDURE — 25000242 PHARM REV CODE 250 ALT 637 W/ HCPCS: Performed by: STUDENT IN AN ORGANIZED HEALTH CARE EDUCATION/TRAINING PROGRAM

## 2018-08-27 PROCEDURE — 63600175 PHARM REV CODE 636 W HCPCS: Performed by: SURGERY

## 2018-08-27 PROCEDURE — 85025 COMPLETE CBC W/AUTO DIFF WBC: CPT

## 2018-08-27 PROCEDURE — 36556 INSERT NON-TUNNEL CV CATH: CPT

## 2018-08-27 PROCEDURE — 94003 VENT MGMT INPAT SUBQ DAY: CPT

## 2018-08-27 PROCEDURE — S0030 INJECTION, METRONIDAZOLE: HCPCS | Performed by: STUDENT IN AN ORGANIZED HEALTH CARE EDUCATION/TRAINING PROGRAM

## 2018-08-27 PROCEDURE — C1751 CATH, INF, PER/CENT/MIDLINE: HCPCS | Performed by: ANESTHESIOLOGY

## 2018-08-27 PROCEDURE — 27000221 HC OXYGEN, UP TO 24 HOURS

## 2018-08-27 PROCEDURE — 83735 ASSAY OF MAGNESIUM: CPT | Mod: 91

## 2018-08-27 PROCEDURE — G8979 MOBILITY GOAL STATUS: HCPCS | Mod: CL

## 2018-08-27 RX ORDER — DOPAMINE HCL IN DEXTROSE 5 % 400MG/.25L
2 INFUSION BOTTLE (ML) INTRAVENOUS CONTINUOUS
Status: DISCONTINUED | OUTPATIENT
Start: 2018-08-27 | End: 2018-08-27

## 2018-08-27 RX ORDER — LIDOCAINE HYDROCHLORIDE 10 MG/ML
INJECTION, SOLUTION EPIDURAL; INFILTRATION; INTRACAUDAL; PERINEURAL
Status: COMPLETED | OUTPATIENT
Start: 2018-08-27 | End: 2018-08-27

## 2018-08-27 RX ORDER — DIPHENHYDRAMINE HCL 25 MG
25 CAPSULE ORAL EVERY 6 HOURS PRN
Status: DISCONTINUED | OUTPATIENT
Start: 2018-08-27 | End: 2018-09-05 | Stop reason: HOSPADM

## 2018-08-27 RX ORDER — VANCOMYCIN HCL IN 5 % DEXTROSE 1G/250ML
15 PLASTIC BAG, INJECTION (ML) INTRAVENOUS ONCE
Status: COMPLETED | OUTPATIENT
Start: 2018-08-27 | End: 2018-08-27

## 2018-08-27 RX ORDER — FAMOTIDINE 20 MG/1
20 TABLET, FILM COATED ORAL 2 TIMES DAILY
Status: DISCONTINUED | OUTPATIENT
Start: 2018-08-27 | End: 2018-09-05 | Stop reason: HOSPADM

## 2018-08-27 RX ORDER — IBUPROFEN 200 MG
24 TABLET ORAL
Status: DISCONTINUED | OUTPATIENT
Start: 2018-08-27 | End: 2018-09-05 | Stop reason: HOSPADM

## 2018-08-27 RX ORDER — NOREPINEPHRINE BITARTRATE/D5W 16MG/250ML
0.02 PLASTIC BAG, INJECTION (ML) INTRAVENOUS CONTINUOUS
Status: DISCONTINUED | OUTPATIENT
Start: 2018-08-27 | End: 2018-08-27

## 2018-08-27 RX ORDER — HYDROCODONE BITARTRATE AND ACETAMINOPHEN 500; 5 MG/1; MG/1
TABLET ORAL
Status: DISCONTINUED | OUTPATIENT
Start: 2018-08-27 | End: 2018-08-27

## 2018-08-27 RX ORDER — IBUPROFEN 200 MG
16 TABLET ORAL
Status: DISCONTINUED | OUTPATIENT
Start: 2018-08-27 | End: 2018-09-05 | Stop reason: HOSPADM

## 2018-08-27 RX ORDER — METRONIDAZOLE 500 MG/100ML
500 INJECTION, SOLUTION INTRAVENOUS
Status: DISCONTINUED | OUTPATIENT
Start: 2018-08-27 | End: 2018-08-30

## 2018-08-27 RX ORDER — ENOXAPARIN SODIUM 100 MG/ML
30 INJECTION SUBCUTANEOUS EVERY 24 HOURS
Status: DISCONTINUED | OUTPATIENT
Start: 2018-08-27 | End: 2018-09-04 | Stop reason: DRUGHIGH

## 2018-08-27 RX ORDER — MIDAZOLAM HYDROCHLORIDE 1 MG/ML
2 INJECTION INTRAMUSCULAR; INTRAVENOUS ONCE
Status: COMPLETED | OUTPATIENT
Start: 2018-08-27 | End: 2018-08-27

## 2018-08-27 RX ORDER — INSULIN ASPART 100 [IU]/ML
1-10 INJECTION, SOLUTION INTRAVENOUS; SUBCUTANEOUS
Status: DISCONTINUED | OUTPATIENT
Start: 2018-08-27 | End: 2018-09-05 | Stop reason: HOSPADM

## 2018-08-27 RX ORDER — LIDOCAINE AND PRILOCAINE 25; 25 MG/G; MG/G
CREAM TOPICAL
Status: DISCONTINUED | OUTPATIENT
Start: 2018-08-27 | End: 2018-09-05 | Stop reason: HOSPADM

## 2018-08-27 RX ADMIN — HYDROMORPHONE HYDROCHLORIDE 0.25 MG: 2 INJECTION INTRAMUSCULAR; INTRAVENOUS; SUBCUTANEOUS at 05:08

## 2018-08-27 RX ADMIN — Medication 3 ML: at 06:08

## 2018-08-27 RX ADMIN — INSULIN ASPART 2 UNITS: 100 INJECTION, SOLUTION INTRAVENOUS; SUBCUTANEOUS at 05:08

## 2018-08-27 RX ADMIN — ATORVASTATIN CALCIUM 40 MG: 40 TABLET, FILM COATED ORAL at 09:08

## 2018-08-27 RX ADMIN — Medication 3 ML: at 09:08

## 2018-08-27 RX ADMIN — TOBRAMYCIN AND DEXAMETHASONE 1 DROP: 3; 1 SUSPENSION/ DROPS OPHTHALMIC at 09:08

## 2018-08-27 RX ADMIN — HYDROMORPHONE HYDROCHLORIDE 0.25 MG: 2 INJECTION INTRAMUSCULAR; INTRAVENOUS; SUBCUTANEOUS at 12:08

## 2018-08-27 RX ADMIN — TOBRAMYCIN AND DEXAMETHASONE 1 DROP: 3; 1 SUSPENSION/ DROPS OPHTHALMIC at 02:08

## 2018-08-27 RX ADMIN — ACETAMINOPHEN 650 MG: 325 TABLET, FILM COATED ORAL at 05:08

## 2018-08-27 RX ADMIN — HYDROMORPHONE HYDROCHLORIDE 0.25 MG: 2 INJECTION INTRAMUSCULAR; INTRAVENOUS; SUBCUTANEOUS at 08:08

## 2018-08-27 RX ADMIN — INSULIN ASPART 1 UNITS: 100 INJECTION, SOLUTION INTRAVENOUS; SUBCUTANEOUS at 09:08

## 2018-08-27 RX ADMIN — IPRATROPIUM BROMIDE AND ALBUTEROL SULFATE 3 ML: .5; 3 SOLUTION RESPIRATORY (INHALATION) at 09:08

## 2018-08-27 RX ADMIN — PIPERACILLIN AND TAZOBACTAM 4.5 G: 4; .5 INJECTION, POWDER, LYOPHILIZED, FOR SOLUTION INTRAVENOUS; PARENTERAL at 05:08

## 2018-08-27 RX ADMIN — FAMOTIDINE 20 MG: 20 TABLET, FILM COATED ORAL at 08:08

## 2018-08-27 RX ADMIN — Medication 3 ML: at 02:08

## 2018-08-27 RX ADMIN — TRAMADOL HYDROCHLORIDE 50 MG: 50 TABLET, COATED ORAL at 09:08

## 2018-08-27 RX ADMIN — ENOXAPARIN SODIUM 30 MG: 100 INJECTION SUBCUTANEOUS at 05:08

## 2018-08-27 RX ADMIN — LIDOCAINE HYDROCHLORIDE 20 MG: 10 INJECTION, SOLUTION EPIDURAL; INFILTRATION; INTRACAUDAL; PERINEURAL at 01:08

## 2018-08-27 RX ADMIN — DILTIAZEM HYDROCHLORIDE 180 MG: 180 CAPSULE, EXTENDED RELEASE ORAL at 09:08

## 2018-08-27 RX ADMIN — PIPERACILLIN AND TAZOBACTAM 4.5 G: 4; .5 INJECTION, POWDER, LYOPHILIZED, FOR SOLUTION INTRAVENOUS; PARENTERAL at 10:08

## 2018-08-27 RX ADMIN — LIDOCAINE AND PRILOCAINE: 25; 25 CREAM TOPICAL at 08:08

## 2018-08-27 RX ADMIN — METRONIDAZOLE 500 MG: 500 INJECTION, SOLUTION INTRAVENOUS at 02:08

## 2018-08-27 RX ADMIN — TOBRAMYCIN AND DEXAMETHASONE 1 DROP: 3; 1 SUSPENSION/ DROPS OPHTHALMIC at 06:08

## 2018-08-27 RX ADMIN — METRONIDAZOLE 500 MG: 500 INJECTION, SOLUTION INTRAVENOUS at 09:08

## 2018-08-27 RX ADMIN — IPRATROPIUM BROMIDE AND ALBUTEROL SULFATE 3 ML: .5; 3 SOLUTION RESPIRATORY (INHALATION) at 12:08

## 2018-08-27 RX ADMIN — PREDNISONE 20 MG: 20 TABLET ORAL at 08:08

## 2018-08-27 RX ADMIN — CLOPIDOGREL BISULFATE 75 MG: 75 TABLET ORAL at 09:08

## 2018-08-27 RX ADMIN — DOPAMINE HYDROCHLORIDE IN DEXTROSE 2 MCG/KG/MIN: 1.6 INJECTION, SOLUTION INTRAVENOUS at 01:08

## 2018-08-27 RX ADMIN — IPRATROPIUM BROMIDE AND ALBUTEROL SULFATE 3 ML: .5; 3 SOLUTION RESPIRATORY (INHALATION) at 07:08

## 2018-08-27 RX ADMIN — IPRATROPIUM BROMIDE AND ALBUTEROL SULFATE 3 ML: .5; 3 SOLUTION RESPIRATORY (INHALATION) at 03:08

## 2018-08-27 RX ADMIN — HYDROMORPHONE HYDROCHLORIDE 0.25 MG: 2 INJECTION INTRAMUSCULAR; INTRAVENOUS; SUBCUTANEOUS at 03:08

## 2018-08-27 RX ADMIN — ASPIRIN 81 MG: 81 TABLET, COATED ORAL at 09:08

## 2018-08-27 RX ADMIN — SODIUM CHLORIDE: 0.9 INJECTION, SOLUTION INTRAVENOUS at 02:08

## 2018-08-27 RX ADMIN — MAGNESIUM SULFATE HEPTAHYDRATE 3 G: 500 INJECTION, SOLUTION INTRAMUSCULAR; INTRAVENOUS at 06:08

## 2018-08-27 RX ADMIN — METRONIDAZOLE 500 MG: 500 INJECTION, SOLUTION INTRAVENOUS at 06:08

## 2018-08-27 RX ADMIN — TOBRAMYCIN AND DEXAMETHASONE 1 DROP: 3; 1 SUSPENSION/ DROPS OPHTHALMIC at 05:08

## 2018-08-27 RX ADMIN — DICLOFENAC: 10 GEL TOPICAL at 10:08

## 2018-08-27 RX ADMIN — PREDNISONE 20 MG: 20 TABLET ORAL at 09:08

## 2018-08-27 RX ADMIN — FUROSEMIDE 40 MG: 40 TABLET ORAL at 08:08

## 2018-08-27 RX ADMIN — VANCOMYCIN HYDROCHLORIDE 1000 MG: 1 INJECTION, POWDER, LYOPHILIZED, FOR SOLUTION INTRAVENOUS at 07:08

## 2018-08-27 RX ADMIN — PIPERACILLIN AND TAZOBACTAM 4.5 G: 4; .5 INJECTION, POWDER, LYOPHILIZED, FOR SOLUTION INTRAVENOUS; PARENTERAL at 02:08

## 2018-08-27 RX ADMIN — MIDAZOLAM HYDROCHLORIDE 2 MG: 1 INJECTION, SOLUTION INTRAMUSCULAR; INTRAVENOUS at 01:08

## 2018-08-27 RX ADMIN — HYDROMORPHONE HYDROCHLORIDE 0.25 MG: 2 INJECTION INTRAMUSCULAR; INTRAVENOUS; SUBCUTANEOUS at 11:08

## 2018-08-27 RX ADMIN — LIDOCAINE 1 PATCH: 50 PATCH CUTANEOUS at 01:08

## 2018-08-27 NOTE — ANESTHESIA POSTPROCEDURE EVALUATION
"Anesthesia Post Evaluation    Patient: Sheryl Martines    Procedure(s) Performed: Procedure(s) (LRB):  APPENDECTOMY, LAPAROSCOPIC---CONVERTED TO OPEN APPENDECTOMY @0950 (N/A)  APPENDECTOMY (N/A)    Final Anesthesia Type: general  Patient location during evaluation: ICU  Patient participation: Yes- Able to Participate  Level of consciousness: awake  Post-procedure vital signs: reviewed and stable  Pain management: adequate  Airway patency: patent  PONV status at discharge: No PONV  Anesthetic complications: no      Cardiovascular status: blood pressure returned to baseline and hemodynamically stable  Respiratory status: spontaneous ventilation and nasal airway  Hydration status: euvolemic  Follow-up not needed.        Visit Vitals  BP (!) 158/67   Pulse 104   Temp 37.1 °C (98.8 °F) (Oral)   Resp (!) 29   Ht 5' 1" (1.549 m)   Wt 86.9 kg (191 lb 9.3 oz)   SpO2 (!) 93%   Breastfeeding? No   BMI 36.20 kg/m²       Pain/Kyle Score: Pain Assessment Performed: Yes (8/27/2018  9:14 AM)  Presence of Pain: complains of pain/discomfort (8/27/2018  9:14 AM)  Pain Rating Prior to Med Admin: 5 (8/27/2018  9:39 AM)  Pain Rating Post Med Admin: 5 (8/27/2018  9:14 AM)        "

## 2018-08-27 NOTE — PLAN OF CARE
Pt resting in bed. Two daughters at bedside this shift. Updated on plan of care. Extubated this AM, maintaining adequate sats with no SOB on room air. H&H improved post blood transfusion. Urinary output increasing. Pt complains of pain in the abdomen, but does not like to take pain medications. Pillow support and positioning for pain relief provided. Vitals stable. Continuous monitoring maintained. Awaiting transfer to telemetry floor.

## 2018-08-27 NOTE — PT/OT/SLP PROGRESS
Occupational Therapy  Missed Visit    Patient Name:  Sheryl Martines   MRN:  [id number]    Patient not seen today secondary to open appendectomy 8/26, intubated.  Please document when pt appropriate for therapy participation.    Fernie Acuña OT  8/27/2018

## 2018-08-27 NOTE — PLAN OF CARE
Notified Dr Wilkes, anesthesiologist place a central line to the left IJ. Xray completed and line ok to use. Versed 2 mg given during procedure.   Pt's last BP 77/52, MAP 61. Dopamine started around 0100. UOP 10 ml/hr now.     Dr will order levophed gtt.

## 2018-08-27 NOTE — PLAN OF CARE
Notified Dr Melendrez, pt received 500 ml NS bolus twice since 1900. Albumin given. BP remains low.   Only 5 ml of UOP since 1900. BUN 10 and creatinine 1.8.   Abdomin soft and tender. Abdominal incision dressing moist with serosang drainage.     Dr Melendrez ordered central line placement, CVP monitoring and dopamine gtt at 2mcg/kg/min. Via telephone order. Will cont to monitor.

## 2018-08-27 NOTE — PT/OT/SLP PROGRESS
Physical Therapy      Patient Name:  Sheryl Martines   MRN:  [id number]    Patient not seen today secondary to open appendectomy 8/26 and re-intubated.  Please document when pt appropriate for therapy participation.      Ileana Wang, PT   8/27/2018

## 2018-08-27 NOTE — PROGRESS NOTES
Pharmacist Renal Dose Adjustment Note    Sheryl Mratines is a 63 y.o. female being treated with the medication lovenox    Patient Data:    Vital Signs (Most Recent):  Temp: 98.9 °F (37.2 °C) (08/27/18 0715)  Pulse: 100 (08/27/18 0815)  Resp: (!) 33 (08/27/18 0815)  BP: 129/64 (08/27/18 0815)  SpO2: 100 % (08/27/18 0815)   Vital Signs (72h Range):  Temp:  [96 °F (35.6 °C)-102.8 °F (39.3 °C)]   Pulse:  []   Resp:  [9-78]   BP: ()/(37-76)   SpO2:  [58 %-100 %]      Recent Labs   Lab  08/26/18   0433  08/26/18 2011 08/27/18 0458   CREATININE  1.1  1.8*  2.2*     Serum creatinine: 2.2 mg/dL (H) 08/27/18 0458  Estimated creatinine clearance: 26.2 mL/min (A)    Medication:lovenox dose: 40mg frequency daily will be changed to medication:lovenox dose:30mg frequency:daily    Pharmacist's Name: Dean Willett  Pharmacist's Extension: [phone]

## 2018-08-27 NOTE — PROGRESS NOTES
ABG results for 8/27/18 at 0015:    PH- 7.41  CO2- 46.9  PO2- 191   BE- (6)  HCO3- 30.3  SAT- 100%    FIO2 weaned to 40%. Will continue to monitor.

## 2018-08-27 NOTE — PROGRESS NOTES
"[facility] Pulmonary/Critical Care Fellow Progress Note    Subjective:      Passed SBT this AM and was extubated to NC and now on room air.  Doing well apart from pain. Surgical site clean.  Hgb < 7 today, transfusion ordered.  UOP marginal.  Got IVF overnight for hypotension and then was on Dopamine.  Now off vasopressors.     Objective:   24-hour Vitals:  Temp:  [98.4 °F (36.9 °C)-102.8 °F (39.3 °C)] 99.3 °F (37.4 °C)  Pulse:  [] 104  Resp:  [9-78] 34  SpO2:  [58 %-100 %] 89 %  BP: ()/(37-76) 142/65    Physical Examination:  Vitals: BP (!) 142/65   Pulse 104   Temp 99.3 °F (37.4 °C) (Oral)   Resp (!) 34   Ht 5' 1" (1.549 m)   Wt 86.9 kg (191 lb 9.3 oz)   SpO2 (!) 89%   Breastfeeding? No   BMI 36.20 kg/m²     General: Awake, alert, NAD, conversant without increased WOB  HEENT: MMM, EOMI, PERRL  CV: RRR, normal S1/S2, no MRG  Chest: CTAB, no rales, rhonchi, wheeze, symmetric chest rise, unlabored breathing  Abdomen: Soft, surgical site clean and dry  Extremities: No cyanosis, clubbing, edema, 2+ pulses throughout  Neuro: AOx3, no focal deficits    Laboratory:  Trended Lab Data:  Recent Labs   Lab  08/26/18   0433  08/26/18   1757  08/26/18 2116 08/27/18   0110  08/27/18   0458   WBC  11.66  13.57*   --    --   19.56*   HGB  7.9*  8.3*  7.3*  6.8*  6.8*   HCT  27.4*  28.7*   --    --   23.7*   PLT  273  279   --    --   270       Recent Labs   Lab  08/25/18   0624  08/26/18   0433  08/26/18 2011 08/27/18   0458   NA  137  136  134*  135*   K  3.2*  3.4*  3.6  4.0   CL  95  95  97  99   CO2  32*  34*  30*  29   BUN  16  10  10  11   CREATININE  1.3  1.1  1.8*  2.2*   GLU  147*  150*  207*  128*   CALCIUM  8.8  8.5*  7.9*  7.9*   MG  1.2*  1.1*   --   0.9*   PHOS  3.2  2.8   --   2.8       Recent Labs   Lab  08/26/18   0433  08/26/18 2011 08/27/18   0458   PROT  5.7*  5.0*  5.2*   ALBUMIN  2.1*  1.9*  2.2*   BILITOT  0.5  0.5  0.7   AST  22  22  24   ALT  16  15  15   ALKPHOS  118  111  100 "       Recent Labs   Lab  08/23/18   0420   INR  0.9       Cardiac:   Recent Labs   Lab  08/23/18   0217  08/23/18   0420  08/23/18   1930  08/25/18   0624  08/26/18   1550   TROPONINI   --   0.267*  0.233*  0.098*   --    BNP  25   --    --    --   45       FLP:   Lab Results   Component Value Date    CHOL 159 08/23/2018    HDL 35 (L) 08/23/2018    LDLCALC 86.6 08/23/2018    TRIG 187 (H) 08/23/2018    CHOLHDL 22.0 08/23/2018     DM:   Lab Results   Component Value Date    HGBA1C 8.1 (H) 08/23/2018    LDLCALC 86.6 08/23/2018    CREATININE 2.2 (H) 08/27/2018     Thyroid:   Lab Results   Component Value Date    TSH 0.902 08/22/2018     Anemia:   Lab Results   Component Value Date    IRON 11 (L) 08/23/2018    TIBC 262 08/23/2018    FERRITIN 78 08/23/2018    GNSRTBXD85 1059 (H) 08/23/2018    FOLATE 11.0 08/23/2018     Urinalysis:   Lab Results   Component Value Date    LABURIN No growth 08/25/2018    COLORU Yellow 08/22/2018    SPECGRAV >=1.030 (A) 08/22/2018    NITRITE Negative 08/22/2018    KETONESU Negative 08/22/2018    UROBILINOGEN 1.0 08/22/2018       Microbiology Data:  Microbiology Results (last 7 days)     Procedure Component Value Units Date/Time    Blood culture [[id number]] Collected:  08/27/18 0809    Order Status:  Sent Specimen:  Blood Updated:  08/27/18 1035    Blood culture [[id number]] Collected:  08/27/18 0809    Order Status:  Sent Specimen:  Blood Updated:  08/27/18 1035    Culture, Respiratory with Gram Stain [[id number]] Collected:  08/27/18 0752    Order Status:  Sent Specimen:  Respiratory from Endotracheal Aspirate Updated:  08/27/18 1026    Aerobic culture [[id number]] Collected:  08/26/18 1015    Order Status:  Completed Specimen:  Wound from Abdomen Updated:  08/27/18 0744     Aerobic Bacterial Culture --     GRAM NEGATIVE ROSSY, LACTOSE   Moderate  Identification and susceptibility pending      Narrative:       APPENDIX    Blood culture [[id number]]     Order Status:  Canceled Specimen:   Blood     Blood culture [[id number]]     Order Status:  Canceled Specimen:  Blood     Culture, Anaerobe [[id number]] Collected:  08/26/18 1015    Order Status:  Completed Specimen:  Wound from Abdomen Updated:  08/27/18 0706     Anaerobic Culture Culture in progress    Narrative:       APPENDIX    Blood culture [[id number]] Collected:  08/23/18 2041    Order Status:  Completed Specimen:  Blood Updated:  08/27/18 0612     Blood Culture, Routine No Growth to date     Blood Culture, Routine No Growth to date     Blood Culture, Routine No Growth to date     Blood Culture, Routine No Growth to date    Blood culture [[id number]] Collected:  08/23/18 2041    Order Status:  Completed Specimen:  Blood Updated:  08/27/18 0612     Blood Culture, Routine No Growth to date     Blood Culture, Routine No Growth to date     Blood Culture, Routine No Growth to date     Blood Culture, Routine No Growth to date    Gram stain [[id number]] Collected:  08/26/18 1015    Order Status:  Completed Specimen:  Wound from Abdomen Updated:  08/26/18 1356     Gram Stain Result Moderate WBC's      Many Gram positive cocci      Many Gram positive rods    Narrative:       APPENDIX    Urine culture [[id number]] Collected:  08/25/18 0628    Order Status:  Completed Specimen:  Urine, Clean Catch Updated:  08/26/18 1144     Urine Culture, Routine No growth        Current Medications:     Infusions:   sodium chloride 0.9% Stopped (08/27/18 0858)    dexmedetomidine (PRECEDEX) infusion Stopped (08/26/18 2020)    norepinephrine bitartrate-D5W Stopped (08/27/18 0330)    propofol 5 mcg/kg/min (08/26/18 1639)        Scheduled:   albuterol-ipratropium  3 mL Nebulization Q4H    aspirin  81 mg Oral Daily    atorvastatin  40 mg Oral Daily    clopidogrel  75 mg Oral Daily    diclofenac sodium   Topical (Top) Daily    diltiaZEM  180 mg Oral Daily    enoxaparin  30 mg Subcutaneous Daily    furosemide  40 mg Oral BID    lidocaine  1 patch Transdermal Q24H     magnesium sulfate IVPB  2 g Intravenous Once    metronidazole  500 mg Intravenous Q8H    piperacillin-tazobactam (ZOSYN) IVPB  4.5 g Intravenous Q8H    predniSONE  20 mg Per OG tube BID    sodium chloride 0.9%  3 mL Intravenous Q8H    tobramycin-dexamethasone 0.3-0.1%  1 drop Both Eyes Q4H While awake    vancomycin (VANCOCIN) IVPB  1,250 mg Intravenous Q24H        PRN:  sodium chloride, acetaminophen, albuterol, albuterol-ipratropium, artificial tears, dextrose 50%, glucagon (human recombinant), glucose, glucose, HYDROmorphone, insulin aspart U-100, labetalol, loperamide, ondansetron, traMADol     Assessment:     Sheryl Martines is a 63 y.o.female with  Patient Active Problem List    Diagnosis Date Noted    Appendicitis with peritonitis 08/26/2018    NIKHIL (acute kidney injury)     Elevated troponin     Altered mental status 08/23/2018    DM (diabetes mellitus) 08/23/2018    HTN (hypertension) 08/23/2018    CAD (coronary artery disease) 08/23/2018    Closed compression fracture of fourth lumbar vertebra 08/23/2018    Opiate use 08/23/2018    Slurred speech 08/23/2018    CVA, old, hemiparesis 08/23/2018    CVA (cerebral vascular accident) 08/23/2018        Plan:     63yoF with hx of asthma/COPD, CAD, DM2, HTN who presented with AMS and was found to have appendicitis s/p appendectomy on 8/26.  Was extubated post-op and had to be re-intubated for respiratory failure.  Passed SBT this AM and was extubated and doing well on room air.    1. Acute Hypoxic Respiratory Failure Requiring MV  2. Asthma/COPD  3. Appendicitis s/p Appendectomy POD #1  4. Mild Troponin Elevation  5. NIKHIL, Suspect ATN  6. Acute Blood Loss Anemia    - Extubated to NC, she has a questionable hx of STELLA and has the habitus to suggest the disease so will put her on CPAP QHS (she wears nocturnal O2)  - Would not give her more volume for now, she has a normal BP and is 4L positive - does not appear floridly overloaded and I suspect she has  ATN related to sepsis/surgery - would monitor this  - Continue nebs as needed, no evidence of exacerbation  - Unknown PFTs, she is on Advair (high dose), Theophylline and chronic Prednisone (10mg daily) - OK with higher dose steroids major-operatively to avoid adrenal crisis but would wean back down to her home dose over the next day or 2  - May be a candidate for LAMA or immunologic therapy as an outpatient - defer to her Pulmonologist  - Cr uptrending, repeat later today - not oliguric but is borderline, if worsens may need Renal consultation - avoid giving her more fluids for now  - Troponin w/ flat profile, no chest pain, monitor  - Continue broad abx, do not think Flagyl is adding much to the regimen, can likely discontinue this    VTE PPx: Lovenox  GI PPx: Famotidine (on steroids, septic)  Code Status: Full    Bryan Magana  [facility] Pulmonary/Critical Care Fellow

## 2018-08-27 NOTE — EICU
RN notes, labs reviewed.  Getting a central line, dopamine orders by Dr Melendrez for hypotension, fluid responsive.  Hg at 7.3.  No signs of bleeding.  Abdomen soft as per notes.  Follow Hg again.

## 2018-08-27 NOTE — PLAN OF CARE
Notified Dr Jovel, who is at bedside. Pt's has little to no UOP in 2 hours. BP dropped, precedex paused, and 500 ml NS bolus given. Temp 102.8 ax, tylenol 650 mg given.     Dr Stockton with eICU ordered ABG after bolus infused.     Dr Jovel will order CMP to checked electrolytes and kidney function.

## 2018-08-27 NOTE — PLAN OF CARE
Updated pt's daughter, Citlaly, anesthesiologist will perform a central line placement to monitor CVP and administer medications.

## 2018-08-27 NOTE — PROGRESS NOTES
"Vancomycin Dosing and Monitoring Pharmacy Protocol    Sheryl Martines is a 63 y.o. female    Height: 5' 1" (1.549 m)   Wt Readings from Last 3 Encounters:   08/27/18 86.9 kg (191 lb 9.3 oz)   08/14/18 81.6 kg (180 lb)     Ideal body weight: 47.8 kg (105 lb 6.1 oz)  Adjusted ideal body weight: 63.4 kg (139 lb 13.8 oz)    Temp Readings from Last 3 Encounters:   08/27/18 100 °F (37.8 °C) (Oral)   08/14/18 97.1 °F (36.2 °C) (Oral)      Lab Results   Component Value Date/Time    WBC 29.02 (H) 08/27/2018 02:20 PM    WBC 19.56 (H) 08/27/2018 04:58 AM    WBC 13.57 (H) 08/26/2018 05:57 PM      Lab Results   Component Value Date/Time    CREATININE 1.9 (H) 08/27/2018 02:20 PM    CREATININE 2.2 (H) 08/27/2018 04:58 AM    CREATININE 1.8 (H) 08/26/2018 08:11 PM      Lab Results   Component Value Date/Time    LACTATE 1.1 08/27/2018 07:45 AM    LACTATE 1.9 08/24/2018 01:02 PM    LACTATE 1.1 08/23/2018 08:51 PM       Serum creatinine: 1.9 mg/dL (H) 08/27/18 1420  Estimated creatinine clearance: 30.3 mL/min (A)    Antibiotics (From admission, onward)      Start     Stop Route Frequency Ordered    08/27/18 2000  vancomycin in dextrose 5 % 1 gram/250 mL IVPB 1,000 mg      -- IV Once 08/27/18 1848    08/27/18 1430  metronidazole IVPB 500 mg      -- IV Every 8 hours (non-standard times) 08/27/18 0747    08/24/18 1445  tobramycin-dexamethasone 0.3-0.1% ophthalmic suspension 1 drop      -- Both Eyes Every 4 hours while awake 08/24/18 1335    08/24/18 1000  piperacillin-tazobactam 4.5 g in dextrose 5 % 100 mL IVPB (ready to mix system)      -- IV Every 8 hours (non-standard times) 08/24/18 0858          Antifungals (From admission, onward)      None            Microbiology Results (last 7 days)       Procedure Component Value Units Date/Time    Blood culture [[id number]] Collected:  08/27/18 0809    Order Status:  Completed Specimen:  Blood Updated:  08/27/18 1715     Blood Culture, Routine No Growth to date    Blood culture [[id number]] " Collected:  08/27/18 0809    Order Status:  Completed Specimen:  Blood Updated:  08/27/18 1715     Blood Culture, Routine No Growth to date    Aerobic culture [[id number]] Collected:  08/26/18 1015    Order Status:  Completed Specimen:  Wound from Abdomen Updated:  08/27/18 1303     Aerobic Bacterial Culture --     GRAM NEGATIVE ROSSY, LACTOSE   Moderate  Identification and susceptibility pending       Aerobic Bacterial Culture --     ENTEROCOCCUS RAFFINOSUS  Moderate  Susceptibility pending      Narrative:       APPENDIX    Culture, Respiratory with Gram Stain [[id number]] Collected:  08/27/18 0752    Order Status:  Completed Specimen:  Respiratory from Endotracheal Aspirate Updated:  08/27/18 1235     Gram Stain (Respiratory) <10 epithelial cells per low power field.     Gram Stain (Respiratory) Few WBC's     Gram Stain (Respiratory) No organisms seen    Blood culture [[id number]]     Order Status:  Canceled Specimen:  Blood     Blood culture [[id number]]     Order Status:  Canceled Specimen:  Blood     Culture, Anaerobe [[id number]] Collected:  08/26/18 1015    Order Status:  Completed Specimen:  Wound from Abdomen Updated:  08/27/18 0706     Anaerobic Culture Culture in progress    Narrative:       APPENDIX    Blood culture [[id number]] Collected:  08/23/18 2041    Order Status:  Completed Specimen:  Blood Updated:  08/27/18 0612     Blood Culture, Routine No Growth to date     Blood Culture, Routine No Growth to date     Blood Culture, Routine No Growth to date     Blood Culture, Routine No Growth to date    Blood culture [[id number]] Collected:  08/23/18 2041    Order Status:  Completed Specimen:  Blood Updated:  08/27/18 0612     Blood Culture, Routine No Growth to date     Blood Culture, Routine No Growth to date     Blood Culture, Routine No Growth to date     Blood Culture, Routine No Growth to date    Gram stain [[id number]] Collected:  08/26/18 1015    Order Status:  Completed Specimen:  Wound from  Abdomen Updated:  18 1356     Gram Stain Result Moderate WBC's      Many Gram positive cocci      Many Gram positive rods    Narrative:       APPENDIX    Urine culture [[id number]] Collected:  18 0628    Order Status:  Completed Specimen:  Urine, Clean Catch Updated:  18 1144     Urine Culture, Routine No growth            Indication/Target trough:   Broad spectrum coverage      Hemodialysis:   N/A    Dosing Weight:   AdjBW--adjusted body weight  If ABW is greater than or equal to 30% over Ideal Body Weight, AdjBW will be used to calculate vancomycin dose.    Last Vancomycin dose: 1000 mg   Date/Time given: 2018          Vancomycin level:  Recent Labs   Lab Result Units  18   1550   Vancomycin-Trough ug/mL  7.4*     Recent Labs   Lab Result Units  18   1550  18   1700   Vancomycin, Random ug/mL   --   12.2   Vancomycin-Trough ug/mL  7.4*   --        Per Protocol Initial/Adjustments Dosin. Pulse Dosing : 15 mg/ kg X 1 following vancomycin pulse dosing protocol    2. Vancomycin Random Level will be drawn on 2018 at 1800 date/time    Pharmacy will continue to follow.    Please contact if you have any further questions. Thank you.    Suzanne Martino, PharmD  [phone]

## 2018-08-27 NOTE — NURSING
Dressing changed by dr clark. Said okay for pt to have full liquid diet and advance as tolerated.

## 2018-08-27 NOTE — PROGRESS NOTES
"Vancomycin Dosing and Monitoring Pharmacy Protocol    Sheryl Martines is a 63 y.o. female    Height: 5' 1" (1.549 m)   Wt Readings from Last 3 Encounters:   08/27/18 86.9 kg (191 lb 9.3 oz)   08/14/18 81.6 kg (180 lb)     Ideal body weight: 47.8 kg (105 lb 6.1 oz)  Adjusted ideal body weight: 63.4 kg (139 lb 13.8 oz)    Temp Readings from Last 3 Encounters:   08/27/18 99.3 °F (37.4 °C) (Oral)   08/14/18 97.1 °F (36.2 °C) (Oral)      Lab Results   Component Value Date/Time    WBC 29.02 (H) 08/27/2018 02:20 PM    WBC 19.56 (H) 08/27/2018 04:58 AM    WBC 13.57 (H) 08/26/2018 05:57 PM      Lab Results   Component Value Date/Time    CREATININE 2.2 (H) 08/27/2018 04:58 AM    CREATININE 1.8 (H) 08/26/2018 08:11 PM    CREATININE 1.1 08/26/2018 04:33 AM      Lab Results   Component Value Date/Time    LACTATE 1.1 08/27/2018 07:45 AM    LACTATE 1.9 08/24/2018 01:02 PM    LACTATE 1.1 08/23/2018 08:51 PM       Serum creatinine: 2.2 mg/dL (H) 08/27/18 0458  Estimated creatinine clearance: 26.2 mL/min (A)    Antibiotics (From admission, onward)      Start     Stop Route Frequency Ordered    08/27/18 1430  metronidazole IVPB 500 mg      -- IV Every 8 hours (non-standard times) 08/27/18 0747    08/24/18 1445  tobramycin-dexamethasone 0.3-0.1% ophthalmic suspension 1 drop      -- Both Eyes Every 4 hours while awake 08/24/18 1335    08/24/18 1000  piperacillin-tazobactam 4.5 g in dextrose 5 % 100 mL IVPB (ready to mix system)      -- IV Every 8 hours (non-standard times) 08/24/18 0858          Antifungals (From admission, onward)      None            Microbiology Results (last 7 days)       Procedure Component Value Units Date/Time    Aerobic culture [[id number]] Collected:  08/26/18 1015    Order Status:  Completed Specimen:  Wound from Abdomen Updated:  08/27/18 1303     Aerobic Bacterial Culture --     GRAM NEGATIVE ROSSY, LACTOSE   Moderate  Identification and susceptibility pending       Aerobic Bacterial Culture --     " ENTEROCOCCUS RAFFINOSUS  Moderate  Susceptibility pending      Narrative:       APPENDIX    Culture, Respiratory with Gram Stain [[id number]] Collected:  08/27/18 0752    Order Status:  Completed Specimen:  Respiratory from Endotracheal Aspirate Updated:  08/27/18 1235     Gram Stain (Respiratory) <10 epithelial cells per low power field.     Gram Stain (Respiratory) Few WBC's     Gram Stain (Respiratory) No organisms seen    Blood culture [[id number]] Collected:  08/27/18 0809    Order Status:  Sent Specimen:  Blood Updated:  08/27/18 1035    Blood culture [[id number]] Collected:  08/27/18 0809    Order Status:  Sent Specimen:  Blood Updated:  08/27/18 1035    Blood culture [[id number]]     Order Status:  Canceled Specimen:  Blood     Blood culture [[id number]]     Order Status:  Canceled Specimen:  Blood     Culture, Anaerobe [[id number]] Collected:  08/26/18 1015    Order Status:  Completed Specimen:  Wound from Abdomen Updated:  08/27/18 0706     Anaerobic Culture Culture in progress    Narrative:       APPENDIX    Blood culture [[id number]] Collected:  08/23/18 2041    Order Status:  Completed Specimen:  Blood Updated:  08/27/18 0612     Blood Culture, Routine No Growth to date     Blood Culture, Routine No Growth to date     Blood Culture, Routine No Growth to date     Blood Culture, Routine No Growth to date    Blood culture [[id number]] Collected:  08/23/18 2041    Order Status:  Completed Specimen:  Blood Updated:  08/27/18 0612     Blood Culture, Routine No Growth to date     Blood Culture, Routine No Growth to date     Blood Culture, Routine No Growth to date     Blood Culture, Routine No Growth to date    Gram stain [[id number]] Collected:  08/26/18 1015    Order Status:  Completed Specimen:  Wound from Abdomen Updated:  08/26/18 1356     Gram Stain Result Moderate WBC's      Many Gram positive cocci      Many Gram positive rods    Narrative:       APPENDIX    Urine culture [[id number]] Collected:  08/25/18 0628     Order Status:  Completed Specimen:  Urine, Clean Catch Updated:  18 1144     Urine Culture, Routine No growth            Indication/Target trough:   Appendicitis 15mcg/ml     Hemodialysis:   N/A    Dosing Weight:   AdjBW--adjusted body weight  If ABW is greater than or equal to 30% over Ideal Body Weight, AdjBW will be used to calculate vancomycin dose.    Last Vancomycin dose: 1000 mg   Date/Time given:  1737          Vancomycin level:  Recent Labs   Lab Result Units  18   1550   Vancomycin-Trough ug/mL  7.4*     Recent Labs   Lab Result Units  18   1550   Vancomycin-Trough ug/mL  7.4*       Per Protocol Initial/Adjustments Dosin. Initial/Adjustment Dose: HOLD next Vancomycin dose due to elevated level and/or worsening renal function  2. Vancomycin Random Level will be drawn on  1630date/time    Pharmacy will continue to follow.    Please contact if you have any further questions. Thank you.    Jamil Willett, PharmD  [phone]

## 2018-08-27 NOTE — PROGRESS NOTES
PGY-2 Progress Note [facility]  Follow up for: appendectomy     Chief Complaint   Patient presents with    Altered Mental Status     To ER with EMS stating that the family called for altered mental status but was awake and alert when they arrived.  pt with slurred speech and has slept alot today, taking pain medication for an injury one week ago.     Hospital Stay Day 4    Subjective:  Febrile last night at 102.8, bp low, received 500cc boluses, started on dopamine and norepi. Decreased UOP.  Patient currently undergoing SBT.      ROS unable to obtain secondary to intubation     Scheduled Meds:   albumin human 5%        albuterol-ipratropium  3 mL Nebulization Q4H    aspirin  81 mg Oral Daily    atorvastatin  40 mg Oral Daily    clopidogrel  75 mg Oral Daily    diclofenac sodium   Topical (Top) Daily    diltiaZEM  180 mg Oral Daily    enoxaparin  40 mg Subcutaneous Daily    furosemide  40 mg Oral BID    lidocaine  1 patch Transdermal Q24H    magnesium sulfate IVPB  2 g Intravenous Once    magnesium sulfate IVPB  3 g Intravenous Once    metronidazole  500 mg Intravenous Q8H    piperacillin-tazobactam (ZOSYN) IVPB  4.5 g Intravenous Q8H    predniSONE  20 mg Per OG tube BID    sodium chloride 0.9%  3 mL Intravenous Q8H    tobramycin-dexamethasone 0.3-0.1%  1 drop Both Eyes Q4H While awake    vancomycin (VANCOCIN) IVPB  1,250 mg Intravenous Q24H     Continuous Infusions:   sodium chloride 0.9% 100 mL/hr at 08/27/18 0220    dexmedetomidine (PRECEDEX) infusion Stopped (08/26/18 2020)    DOPamine 2 mcg/kg/min (08/27/18 0102)    norepinephrine bitartrate-D5W Stopped (08/27/18 0330)    propofol 5 mcg/kg/min (08/26/18 1639)     PRN Meds:acetaminophen, albuterol, albuterol-ipratropium, artificial tears, dextrose 50%, glucagon (human recombinant), HYDROmorphone, insulin aspart U-100, labetalol, loperamide, ondansetron, traMADol    Review of patient's allergies indicates:   Allergen Reactions    Ace  inhibitors Swelling    Corticosteroids (glucocorticoids)     Hydralazine analogues     Tetracyclines Swelling    Travatan (with benzalkonium) [travoprost (benzalkonium)]        Objectives:     Vitals(Most Recent)      BP  Min: 65/37  Max: 152/57  Temp  Av.9 °F (37.7 °C)  Min: 98.4 °F (36.9 °C)  Max: 102.8 °F (39.3 °C)  Pulse  Av.4  Min: 72  Max: 114  Resp  Av.6  Min: 9  Max: 78  SpO2  Av.5 %  Min: 58 %  Max: 100 %  Weight  Av.9 kg (191 lb 9.3 oz)  Min: 86.9 kg (191 lb 9.3 oz)  Max: 86.9 kg (191 lb 9.3 oz)             Vitals(Omkk25h)  Temp:  [98.4 °F (36.9 °C)-102.8 °F (39.3 °C)]   Pulse:  []   Resp:  [9-78]   BP: ()/(37-76)   SpO2:  [58 %-100 %]     I & O(Zufe08y)    Intake/Output Summary (Last 24 hours) at 2018 0759  Last data filed at 2018 0705  Gross per 24 hour   Intake 4681.8 ml   Output 320 ml   Net 4361.8 ml     General: Patient is alert, currently intubated on ventilator  HEENT: NCAT. PERRLA.  Neck: Supple. No JVD. No LAD.    CV: RRR, no murmur  Chest: Mild wheezing appreciated throughout lung fields. Moving air well, remains intubated on SBT  Abd: Soft, ND, mild tenderness at surgical site  Ext: No edema appeciated   Skin: Intact. No rash. No lesions.   Neuro: CN II-XII intact. No focal deficit. Strength 5/5 throughout.   Psych: unable to assess    LABS  CBC  Recent Labs   Lab  18   0433  18   1757  18   2116  18   0110  18   0458   WBC  11.66  13.57*   --    --   19.56*   RBC  3.42*  3.51*   --    --   2.96*   HGB  7.9*  8.3*  7.3*  6.8*  6.8*   HCT  27.4*  28.7*   --    --   23.7*   PLT  273  279   --    --   270   MCV  80*  82   --    --   80*   MCH  23.1*  23.6*   --    --   23.0*   MCHC  28.8*  28.9*   --    --   28.7*     BMP  Recent Labs   Lab  18   0433  18   0458   NA  136  134*  135*   K  3.4*  3.6  4.0   CO2  34*  30*  29   CL  95  97  99   BUN  10  10  11   CREATININE  1.1  1.8*  2.2*    GLU  150*  207*  128*     POCT-Glucose  POCT Glucose   Date Value Ref Range Status   08/27/2018 143 (H) 70 - 110 mg/dL Final   08/26/2018 102 70 - 110 mg/dL Final   08/26/2018 134 (H) 70 - 110 mg/dL Final   08/25/2018 217 (H) 70 - 110 mg/dL Final   08/25/2018 238 (H) 70 - 110 mg/dL Final   08/25/2018 153 (H) 70 - 110 mg/dL Final   08/25/2018 150 (H) 70 - 110 mg/dL Final   08/24/2018 183 (H) 70 - 110 mg/dL Final   08/24/2018 217 (H) 70 - 110 mg/dL Final   08/24/2018 230 (H) 70 - 110 mg/dL Final       Recent Labs   Lab  08/25/18   0624  08/26/18   0433  08/26/18 2011 08/27/18   0458   CALCIUM  8.8  8.5*  7.9*  7.9*   MG  1.2*  1.1*   --   0.9*   PHOS  3.2  2.8   --   2.8     LFT  Recent Labs   Lab  08/26/18   0433  08/26/18 2011 08/27/18   0458   PROT  5.7*  5.0*  5.2*   ALBUMIN  2.1*  1.9*  2.2*   BILITOT  0.5  0.5  0.7   AST  22  22  24   ALKPHOS  118  111  100   ALT  16  15  15     COAGS  Recent Labs   Lab  08/23/18   0420   INR  0.9   APTT  29.0     CE  Recent Labs   Lab  08/23/18   0420  08/23/18   1930  08/25/18   0624   TROPONINI  0.267*  0.233*  0.098*     ABGs  Recent Labs   Lab  08/27/18   0449   PH  7.421   PCO2  44.3   PO2  89   HCO3  28.8*   POCSATURATED  97   BE  4     BNP  Recent Labs   Lab  08/23/18   0217  08/26/18   1550   BNP  25  45     LAST HbA1c  Lab Results   Component Value Date    HGBA1C 8.1 (H) 08/23/2018     Imaging- no new imaging     Assessment/Plan:   63 y.o. 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  Patient went to OR yesterday AM for appendectomy. Patient on Vanc and zosyn day 3 & Flagyl day 2. Patient remains intubated after surgery, currently undergoing SBT     Appendiceal Abscess  - Blood cultures x 2 - Negative   - POD#2 for open appendectomy   - Abx- vanc, zosyn, and flagyl   - f/u further surgery recs    Intubated on ventilator  - Pulm consulted for vent management  - Currently  undergoing SBT    NIKHIL  - BUN/Crea of 26/2.5 on admit  - improved to 16/1.3 on 8/25  - 11/2.2 this AM  - continue to monitor  - consider switching ABX to avoid further kidney injury    CVA    CT head is negative for acute hemorrhagic event  Anti-platelet therapy: ASA 81/Plavix 75mg   Atorvastatin 40mg PO daily  Q4 nuero checks  PT/OT/ST  Fall precautions  Drug screen pos for opiates      Carotid Doppler ordered- Allowing for limitation, 50-69% right ICA stenosis with less than 50% left ICA stenosis.  Antegrade vertebral arteries bilaterally.     2D echo with bubble study- 1 - Concentric remodeling.     2 - No wall motion abnormalities.     3 - Normal left ventricular systolic function (EF 60-65%).     4 - Impaired LV relaxation, normal LAP (grade 1 diastolic dysfunction).     5 - Normal right ventricular systolic function .     6 - The estimated PA systolic pressure is 14 mmHg.     7 - No evidence of intracardiac shunt.      EEG performed- encephalopathy      CTA head and neck Unremarkable and without evidence for proximal significant stenosis or occlusion.     HTN  Clonidine .1mg PO BID  Lasix 40mg PO BID  Diltiazem 180mg ER PO daily      DM2  -Patient on moderate dose SSI     CAD  - Continue ASA and plavix and Atorvastatin      Elevated troponin- resolving   - 0.273-->0.267--> 0.233--> .098       PT/OT: ordered and following  Code: full  Diet: NPO while on ventilator  Ppx: dvt: lovenox   Dispo: f/u pulm re: SBT, f/u surgery    Case discussed with staff    Leta Jovel MD  [facility] HO2  08/27/2018

## 2018-08-27 NOTE — PLAN OF CARE
Problem: Nutrition, Imbalanced: Inadequate Oral Intake (Adult)  Goal: Improved Oral Intake  Patient will demonstrate the desired outcomes by discharge/transition of care.  Outcome: Ongoing (interventions implemented as appropriate)    Recommendation/Intervention:   1. Advance diet as tolerated to 2000cal ADA.   2. Encourage intake at meals as tolerated.   3. Monitor need for supplements.    Goals:   Pt will conume at least 50% intake at meals  Nutrition Goal Status: new  Communication of RD Recs: discussed on rounds

## 2018-08-27 NOTE — EICU
Low MAP. On Vent.  Off of precedex and propofol. Post op- appendectomy. Re intubated in ICU.    Camera Eval:  Discussed with bed side.  SBP 76. About to get saline bolus 500 ml.  /5/14/100%.  sats 100%.    Re check /58    Plan:  - give bolus  - ABG   - Precedex /propofol for sedation.  Watch for any  hypotension and UOP.

## 2018-08-27 NOTE — PROGRESS NOTES
ABG obtained per order. Dr Stockton notified of results and ordered the  FIO2 weaned to 60%,and Vt decreased to 450.

## 2018-08-27 NOTE — PT/OT/SLP RE-EVAL
Occupational Therapy   Re-evaluation    Name: Sheryl Martines  MRN: [id number]  Admitting Diagnosis:  Appendicitis with peritonitis 1 Day Post-Op    Recommendations:     Discharge Recommendations: nursing facility, skilled  Discharge Equipment Recommendations:  other (see comments)(TBD)  Barriers to discharge:  Decreased caregiver support, Inaccessible home environment    History:     Past Medical History:   Diagnosis Date    Asthma     Closed compression fracture of fourth lumbar vertebra     COPD (chronic obstructive pulmonary disease)     Coronary artery disease     Diabetes mellitus     Glaucoma     High cholesterol     Hypertension     Iritis     Pulmonary embolus     Stroke     rt sided weakness.       Past Surgical History:   Procedure Laterality Date    ABDOMINAL SURGERY      BACK SURGERY      stimulator    CATARACT EXTRACTION      HYSTERECTOMY         Subjective     Chief Complaint: abdominal pain, fear of falling  Patient/Family stated goals: return to PLOF  Communicated with: nurse prior to session.  Pain/Comfort:  · Pain Rating 1: 2/10  · Location - Orientation 1: generalized  · Location 1: abdomen  · Pain Addressed 1: Reposition, Distraction, Pre-medicate for activity, Nurse notified, Cessation of Activity  · Pain Rating Post-Intervention 1: 8/10    Objective:     Patient found with: bed alarm, central line, nieves catheter, SCD, pulse ox (continuous), blood pressure cuff    General Precautions: Standard, fall   Orthopedic Precautions:spinal precautions   Braces:       Occupational Performance:    Bed Mobility:    · Patient completed Rolling/Turning to Right with minimum assistance and moderate assistance  · Patient completed Scooting/Bridging with moderate assistance and maximal assistance  · Patient completed Supine to Sit with moderate assistance and maximal assistance  · Patient completed Sit to Supine with moderate assistance   · Pt resistant to move; reports pain limiting her ability  to lean forward, actively pushing back into extension.      Functional Mobility/Transfers:  · NT    Activities of Daily Living:  · Lower Body Dressing: dependence socks    Cognitive/Visual Perceptual:  AO4    Physical Exam:  RUE AROM/sstrength WFL by observation; per chart review and pt report L shld bursitis limiting ROM and strength  Poor sit balance, pushing into extension    Patient left HOB elevated with all lines intact, call button in reach, bed alarm on and nurse notified    [facility] 6 Click:  [facility] Total Score: 12    Treatment & Education:  Pt educated on role of OT/POC, forward wt shift for balance, post acute recs, benefits of therapy  Education:    Assessment:     Sheryl Martines is a 63 y.o. female with a medical diagnosis of Appendicitis with peritonitis.  She presents with performance deficits affecting function are weakness, impaired functional mobilty, decreased safety awareness, impaired endurance, gait instability, pain, impaired self care skills, impaired balance, decreased upper extremity function, decreased lower extremity function, impaired skin.      Rehab Prognosis:  good; patient would benefit from acute skilled OT services to address these deficits and reach maximum level of function.         Clinical Decision Making:          Plan:     Patient to be seen 5 x/week to address the above listed problems via self-care/home management, therapeutic activities, therapeutic exercises  · Plan of Care Expires: 09/27/18  · Plan of Care Reviewed with: patient    This Plan of care has been discussed with the patient who was involved in its development and understands and is in agreement with the identified goals and treatment plan    GOALS:   Multidisciplinary Problems     Occupational Therapy Goals        Problem: Occupational Therapy Goal    Goal Priority Disciplines Outcome Interventions   Occupational Therapy Goal     OT, PT/OT Ongoing (interventions implemented as appropriate)    Description:  Goals  to be met by: 8/31/2018    Patient will increase functional independence with ADLs by performing:    Feeding with Modified [place].  UE Dressing with Set-up Assistance.  LE Dressing with Minimal Assistance.  Grooming while seated with Set-up Assistance.  Toileting from bedside commode with Minimal Assistance for hygiene and clothing management.   Sitting at edge of bed x20 minutes with Supervision.  Rolling to Bilateral with Modified [place].   Supine to sit with Modified [place].  Stand pivot transfers with Stand-by Assistance.  Step transfer with Supervision and Stand-by Assistance  Toilet transfer to bedside commode with Stand-by Assistance.  Increased functional strength to WFL for BUE.  Upper extremity exercise program 10 reps per handout, with supervision.                      Time Tracking:     OT Date of Treatment: 08/27/18  OT Start Time: 1358  OT Stop Time: 1416  OT Total Time (min): 18 min w/PT    Billable Minutes:Re-eval 18    Fernie Acuña OT  8/27/2018

## 2018-08-27 NOTE — PLAN OF CARE
TN rounded on patient in ICU. Patient finishing up PT/OT session, tired after session. Contact numbers provided to patient. Patient states she travels back and forth from [place] and [place] for the past year with her Daughter.  Patient gave TN permission to discuss plan of care with daughter- Kandice Ritter. TN to follow up with needs in am.        08/27/18 4744   Discharge Reassessment   Assessment Type Discharge Planning Reassessment   Provided patient/caregiver education on the expected discharge date and the discharge plan Yes   Do you have any problems affording any of your prescribed medications? No   Discharge Plan A Home with family   Discharge Plan B Home with family   Patient choice form signed by patient/caregiver Yes   Can the patient answer the patient profile reliably? Yes, cognitively intact   How does the patient rate their overall health at the present time? Fair   Describe the patient's ability to walk at the present time. Walks with the help of equipment   How often would a person be available to care for the patient? Often   Number of comorbid conditions (as recorded on the chart) Two   During the past month, has the patient often been bothered by feeling down, depressed or hopeless? No   During the past month, has the patient often been bothered by little interest or pleasure in doing things? No

## 2018-08-27 NOTE — PLAN OF CARE
Problem: Physical Therapy Goal  Goal: Physical Therapy Goal  Goals to be met by: 9/27/2018     Patient will increase functional independence with mobility by performing:    Updated 8/27/2018: Re-Eval completed this date  1. Supine to sit with Modified [place]  2. Sit to supine with Modified [place]  3. Rolling to Left and Right with Modified [place].  4. Sit to stand transfer with Stand-by Assistance with AD  5. Bed to chair transfer with Stand-by Assistance with AD  6. Gait  x  feet with Stand-by Assistance using AD.   7. Ascend/Descend 8 inch curb step with Contact Guard Assistance and Minimal Assistance using AD  8. Lower extremity exercise program x10 reps with supervision     Outcome: Ongoing (interventions implemented as appropriate)  PT re-evaluation completed this date, pt only tolerating EOB sitting 3-4 minutes but unable to assume upright sitting 2/2 abdominal pain and deferred standing attempts or further mobility today. Recommending SNF placement upon d/c.

## 2018-08-27 NOTE — PROGRESS NOTES
" [facility]  Adult Nutrition  Progress Note    SUMMARY       Recommendations    Recommendation/Intervention:   1. Advance diet as tolerated to 2000cal ADA.   2. Encourage intake at meals as tolerated.   3. Monitor need for supplements.    Goals:   Pt will conume at least 50% intake at meals  Nutrition Goal Status: new  Communication of RD Recs: discussed on rounds    Reason for Assessment  Reason for Assessment: RD follow-up  Diagnosis: (AMS)  Relevant Medical History: CAD, DM, high cholesterol, stroke, pulmonary embolus  Interdisciplinary Rounds: attended  General Information Comments: Pt extubated this morning. s/p appendectomy 8/26. On Full Liquid diet.  Nutrition Discharge Planning: d/c diet per ST    Nutrition Risk Screen  Nutrition Risk Screen: no indicators present    Nutrition/Diet History  Food Preferences: unable to assess  Do you have any cultural, spiritual, Jain conflicts, given your current situation?: none  Factors Affecting Nutritional Intake: (just extubated)    Anthropometrics  Temp: 99.3 °F (37.4 °C)  Height Method: Stated  Height: 5' 1" (154.9 cm)  Height (inches): 61 in  Weight Method: Bed Scale  Weight: 86.9 kg (191 lb 9.3 oz)  Weight (lb): 191.58 lb  Ideal Body Weight (IBW), Female: 105 lb  % Ideal Body Weight, Female (lb): 169.89 lb  BMI (Calculated): 33.8  BMI Grade: 30 - 34.9- obesity - grade I     Lab/Procedures/Meds  Pertinent Labs Reviewed: reviewed  Pertinent Labs Comments: Na 135L, Crea 2.2H, Ca 7.9L, Alb 2.2L  Pertinent Medications Reviewed: reviewed  Pertinent Medications Comments: aspirin, Lasix, prednisone    Physical Findings/Assessment  Overall Physical Appearance: overweight  Tubes: (none)  Oral/Mouth Cavity: (unable to assess)  Skin: (Finn 14-abd incision)    Estimated/Assessed Needs  Weight Used For Calorie Calculations: 86.9 kg (191 lb 9.3 oz)  Energy Calorie Requirements (kcal): 2172 (25 kcal/kg)  Energy Need Method: Kcal/kg  Protein Requirements: " 70g (0.8g/kg)  Weight Used For Protein Calculations: 86.9 kg (191 lb 9.3 oz)  Fluid Need Method: RDA Method  RDA Method (mL): 2172  CHO Requirement: 175g    Nutrition Prescription Ordered  Current Diet Order: Full Liquid    Evaluation of Received Nutrient/Fluid Intake  Energy Calories Required: not meeting needs  Protein Required: not meeting needs  Fluid Required: not meeting needs  Comments: LBM 8/26  % Intake of Estimated Energy Needs: 0 - 25 %  % Meal Intake: 0 - 25 %    Nutrition Risk  Level of Risk/Frequency of Follow-up: (2xweekly)     Assessment and Plan    Altered mental status    Contributing Nutrition Diagnosis  Inadequate energy intake    Related to (etiology):   dx    Signs and Symptoms (as evidenced by):   NPO     Interventions/Recommendations (treatment strategy):  See recs    Nutrition Diagnosis Status:   Improving             Monitor and Evaluation  Food and Nutrient Intake: food and beverage intake  Food and Nutrient Adminstration: diet order  Physical Activity and Function: nutrition-related ADLs and IADLs  Anthropometric Measurements: weight  Biochemical Data, Medical Tests and Procedures: electrolyte and renal panel  Nutrition-Focused Physical Findings: overall appearance     Nutrition Follow-Up  RD Follow-up?: Yes

## 2018-08-27 NOTE — EICU
S/P open appendectomy earlier in day.  BP dropping again when going on Precedex /propofol sedation.    Camera EWval:  Discussed with bed side RN and RT.  BP went up after last bolus.  Has a port, no CVP line.   UOP since morning 200( may be not recorded full).  Creatinine 1.8.  No fever.  BP down < 70     ABG reported.  Alkalotic. Albumin at 1.9  CxR ok.  BNP normal, EF normal/concetric hypertrophy.      Plan:  - Albumin 12.5 gm and saline 500 ml bolus stat  - start NS at 100 ml/hr.  Watch UOP   - Vent setting changed to 450/5/14/60%, ABG at Mid night  - hemoglobin at 5 pm > 8  - get stat Hg also.

## 2018-08-27 NOTE — PROGRESS NOTES
"Surgery follow up  POD#1  S/pOPen Appednectomy  BP (!) 151/72   Pulse 94   Temp 98.9 °F (37.2 °C) (Oral)   Resp (!) 24   Ht 5' 1" (1.549 m)   Wt 86.9 kg (191 lb 9.3 oz)   SpO2 97%   Breastfeeding? No   BMI 36.20 kg/m²   I/O last 3 completed shifts:  In: 4681.8 [I.V.:1731.8; NG/GT:50; IV Piggyback:2900]  Out: 1293 [Urine:1293]  I/O this shift:  In: -   Out: 115 [Urine:115]  Recent Results (from the past 336 hour(s))   CBC auto differential    Collection Time: 08/27/18  4:58 AM   Result Value Ref Range    WBC 19.56 (H) 3.90 - 12.70 K/uL    Hemoglobin 6.8 (L) 12.0 - 16.0 g/dL    Hematocrit 23.7 (L) 37.0 - 48.5 %    Platelets 270 150 - 350 K/uL   CBC auto differential    Collection Time: 08/26/18  5:57 PM   Result Value Ref Range    WBC 13.57 (H) 3.90 - 12.70 K/uL    Hemoglobin 8.3 (L) 12.0 - 16.0 g/dL    Hematocrit 28.7 (L) 37.0 - 48.5 %    Platelets 279 150 - 350 K/uL   CBC auto differential    Collection Time: 08/26/18  4:33 AM   Result Value Ref Range    WBC 11.66 3.90 - 12.70 K/uL    Hemoglobin 7.9 (L) 12.0 - 16.0 g/dL    Hematocrit 27.4 (L) 37.0 - 48.5 %    Platelets 273 150 - 350 K/uL     Recent Results (from the past 336 hour(s))   Basic metabolic panel    Collection Time: 08/24/18 12:06 PM   Result Value Ref Range    Sodium 135 (L) 136 - 145 mmol/L    Potassium 3.5 3.5 - 5.1 mmol/L    Chloride 91 (L) 95 - 110 mmol/L    CO2 32 (H) 23 - 29 mmol/L    BUN, Bld 20 8 - 23 mg/dL    Creatinine 1.7 (H) 0.5 - 1.4 mg/dL    Calcium 9.6 8.7 - 10.5 mg/dL    Anion Gap 12 8 - 16 mmol/L     Extubated , mild abdominal pain   Dessing changed wound redressed with xeroform and 4 x 4 and tape  "

## 2018-08-27 NOTE — PLAN OF CARE
Problem: Occupational Therapy Goal  Goal: Occupational Therapy Goal  Goals to be met by: 8/31/2018    Patient will increase functional independence with ADLs by performing:    Feeding with Modified [place].  UE Dressing with Set-up Assistance.  LE Dressing with Minimal Assistance.  Grooming while seated with Set-up Assistance.  Toileting from bedside commode with Minimal Assistance for hygiene and clothing management.   Sitting at edge of bed x20 minutes with Supervision.  Rolling to Bilateral with Modified [place].   Supine to sit with Modified [place].  Stand pivot transfers with Stand-by Assistance.  Step transfer with Supervision and Stand-by Assistance  Toilet transfer to bedside commode with Stand-by Assistance.  Increased functional strength to WFL for BUE.  Upper extremity exercise program 10 reps per handout, with supervision.     Outcome: Ongoing (interventions implemented as appropriate)  OT re-eval post surgery completed. Goals remain appropriate.    If pt does not have significant improvement in therapy tomorrow, would rec SNF at discharge

## 2018-08-27 NOTE — PLAN OF CARE
Problem: Patient Care Overview  Goal: Plan of Care Review  Outcome: Ongoing (interventions implemented as appropriate)  Pt awake and following commands. Pt complains of pain to abdomen and back, relieve with medication and pillow support. Lidocaine patch applied to lower back. Denies N/V. On mech vent for resp support. Right abdominal incision with moist serosanguinous drainage noted. UOP > 30 ml/hr. Dopamine renal dose initiated. Blood glucose monitored. No signs of pressure ulcer. Preventative foam dressing applied to sacral and heels. Bed in low position. Call light within reach. Will continue to monitor.

## 2018-08-27 NOTE — PLAN OF CARE
Problem: Patient Care Overview  Goal: Plan of Care Review  Outcome: Ongoing (interventions implemented as appropriate)  Patient on  with documented settings.  Alarms are set and functioning with adequate volumes.  AMBU bag and mask at bedside. The proper method of use, as well as anticipated side effects, of this aerosol treatment are discussed and demonstrated to the patient. Will continue to monitor.

## 2018-08-27 NOTE — ANESTHESIA PROCEDURE NOTES
Central Line    Diagnosis: hypotension  Patient location during procedure: ICU  Procedure start time: 8/27/2018 1:44 AM  Timeout: 8/27/2018 1:39 AM  Procedure end time: 8/27/2018 1:59 AM  Staffing  Anesthesiologist: Sotero Chaney MD  Performed: anesthesiologist   Anesthesiologist was present at the time of the procedure.  Preanesthetic Checklist  Completed: patient identified, site marked, surgical consent, pre-op evaluation, timeout performed, IV checked, risks and benefits discussed, monitors and equipment checked and anesthesia consent given  Indication  Indication: hemodynamic monitoring, vascular access, med administration     Anesthesia   local infiltration    Central Line  Skin Prep: skin prepped with ChloraPrep, skin prep agent completely dried prior to procedure  hand hygiene performed prior to central venous catheter insertion  Location: left internal jugular,   Catheter type: triple lumen  Catheter Size: 7 Fr  Inserted Catheter Length: 16 cm  Ultrasound: vascular probe with ultrasound  Vessel Caliber: medium, patent  Vascular Doppler:  not done, compressibility normal  Needle advanced into vessel with real time Ultrasound guidance.  Guidewire confirmed in vessel.  Sterile sheath used.  Image recorded and saved.  Manometry: none  Insertion Attempts: 1   Securement:line sutured, chlorhexidine patch, sterile dressing applied and blood return through all ports     Post-Procedure  X-Ray: no pneumothorax on x-ray, placement verified by x-ray, tip termination and successful placement  Tip termination comments: i&s J junction   Adverse Events:none

## 2018-08-27 NOTE — PT/OT/SLP RE-EVAL
"Physical Therapy Re-evaluation    Patient Name:  Sheryl Martines   MRN:  [id number]    Recommendations:     Discharge Recommendations:  nursing facility, skilled   Discharge Equipment Recommendations: (TBD)   Barriers to discharge: limited activity tolerance    Assessment:     Sheryl Martines is a 63 y.o. female admitted with a medical diagnosis of Appendicitis with peritonitis.  She presents with the following impairments/functional limitations:  weakness, impaired endurance, impaired self care skills, impaired functional mobilty, gait instability, impaired balance, decreased lower extremity function, decreased upper extremity function, pain, decreased ROM, impaired skin, edema. PT re-evaluation completed this date, pt only tolerating EOB sitting 3-4 minutes but unable to assume upright sitting 2/2 abdominal pain and deferred standing attempts or further mobility today. Recommending SNF placement upon d/c.    Rehab Prognosis: Good; patient would benefit from acute skilled PT services to address these deficits and reach maximum level of function.      Recent Surgery: Procedure(s) (LRB):  APPENDECTOMY, LAPAROSCOPIC---CONVERTED TO OPEN APPENDECTOMY @0950 (N/A)  APPENDECTOMY (N/A) 1 Day Post-Op    Plan:     During this hospitalization, patient to be seen 6 x/week to address the above listed problems via gait training, therapeutic activities, therapeutic exercises  · Plan of Care Expires:  09/27/18   Plan of Care Reviewed with: patient    Subjective     Communicated with GILBERT Grimm prior to session.  Patient found supine with HOB elevated and slightly rolled to the right upon PT entry to room, agreeable to evaluation.      Chief Complaint: abdominal pain  Patient comments/goals: "give me a minute" pt repeats when completing supine>sit  Pain/Comfort:  · Pain Rating 1: 2/10(2/10 at rest, 10/10 sitting EOB)  · Location 1: abdomen(abdominal incision)  · Pain Addressed 1: Reposition, Distraction, Cessation of Activity, Nurse " "notified  · Pain Rating Post-Intervention 1: 8/10    Patients cultural, spiritual, Pentecostalism conflicts given the current situation: none      Objective:     Patient found with: bed alarm, nieves catheter(ICU monitoring)     General Precautions: Standard, fall   Orthopedic Precautions:N/A   Braces: N/A     Exams:  · Pt reporting pain limiting her ability to sit upright or attempt standing today- states "I'm sorry ladies"  · Postural Exam:  Patient presented with the following abnormalities:    · -       unable to assess 2/2 pt leaning R and posteriorly in sitting  · Skin Integrity/Edema:      · -       Edema: Moderate B feet  · RLE ROM: ankle, knee, and hipgrossly assessed in supine: ankle/knee appear WFLs, hip limited by abdominal pain    Functional Mobility:  · Bed Mobility:     · Scooting: minimally scooted towards HOB in R sidelying with B hands on bed rail then required total x 2 for drawsheet transfer for remained of scoot  · Supine to Sit: mod/max A- able to scoot LEs off EOB, required assist to raise trunk but never assume upright sitting  · Sit to Supine: moderate assistance to raise BLEs into bed, cues for UE placement    AM-PAC 6 CLICK MOBILITY  Total Score:9       Therapeutic Activities and Exercises:  Attempted EOB sitting 3-4 minutes but pt remains in R posterior leaning position with c/o abdominal pain when attempting to sit upright  Attempted to have pt stand to side step towards HOB but pt refused and reported she cannot 2/2 pain, returned to supine and scooted towards HOB  Educated to completed BLE supine exercises    Patient left HOB elevated with all lines intact, call button in reach, bed alarm on and RN notified.    GOALS:   Multidisciplinary Problems     Physical Therapy Goals        Problem: Physical Therapy Goal    Goal Priority Disciplines Outcome Goal Variances Interventions   Physical Therapy Goal     PT, PT/OT Ongoing (interventions implemented as appropriate)     Description:  Goals to " be met by: 9/27/2018     Patient will increase functional independence with mobility by performing:    Updated 8/27/2018: Re-Eval completed this date  1. Supine to sit with Modified [place]  2. Sit to supine with Modified [place]  3. Rolling to Left and Right with Modified [place].  4. Sit to stand transfer with Stand-by Assistance with AD  5. Bed to chair transfer with Stand-by Assistance with AD  6. Gait  x  feet with Stand-by Assistance using AD.   7. Ascend/Descend 8 inch curb step with Contact Guard Assistance and Minimal Assistance using AD  8. Lower extremity exercise program x10 reps with supervision              Problem: Physical Therapy Goal    Goal Priority Disciplines Outcome Goal Variances Interventions   Physical Therapy Goal     PT, PT/OT                      History:     Past Medical History:   Diagnosis Date    Asthma     Closed compression fracture of fourth lumbar vertebra     COPD (chronic obstructive pulmonary disease)     Coronary artery disease     Diabetes mellitus     Glaucoma     High cholesterol     Hypertension     Iritis     Pulmonary embolus     Stroke     rt sided weakness.       Past Surgical History:   Procedure Laterality Date    ABDOMINAL SURGERY      BACK SURGERY      stimulator    CATARACT EXTRACTION      HYSTERECTOMY         Clinical Decision Making:     History  Co-morbidities and personal factors that may impact the plan of care Examination  Body Structures and Functions, activity limitations and participation restrictions that may impact the plan of care Clinical Presentation   Decision Making/ Complexity Score   Co-morbidities:   [] Time since onset of injury / illness / exacerbation  [] Status of current condition  []Patient's cognitive status and safety concerns    [] Multiple Medical Problems (see med hx)  Personal Factors:   [] Patient's age  [] Prior Level of function   [] Patient's home situation (environment and family support)  []  Patient's level of motivation  [] Expected progression of patient      HISTORY:(criteria)    [x] 79174 - no personal factors/history    [] 06635 - has 1-2 personal factor/comorbidity     [] 06978 - has >3 personal factor/comorbidity     Body Regions:  [] Objective examination findings  [] Head     []  Neck  [x] Trunk   [] Upper Extremity  [] Lower Extremity    Body Systems:  [] For communication ability, affect, cognition, language, and learning style: the assessment of the ability to make needs known, consciousness, orientation (person, place, and time), expected emotional /behavioral responses, and learning preferences (eg, learning barriers, education  needs)  [x] For the neuromuscular system: a general assessment of gross coordinated movement (eg, balance, gait, locomotion, transfers, and transitions) and motor function  (motor control and motor learning)  [x] For the musculoskeletal system: the assessment of gross symmetry, gross range of motion, gross strength, height, and weight  [x] For the integumentary system: the assessment of pliability(texture), presence of scar formation, skin color, and skin integrity  [] For cardiovascular/pulmonary system: the assessment of heart rate, respiratory rate, blood pressure, and edema     Activity limitations:    [] Patient's cognitive status and saf ety concerns          [] Status of current condition      [] Weight bearing restriction  [] Cardiopulmunary Restriction    Participation Restrictions:   [] Goals and goal agreement with the patient     [] Rehab potential (prognosis) and probable outcome      Examination of Body System: (criteria)    [] 17951 - addressing 1-2 elements    [x] 92503 - addressing a total of 3 or more elements     [] 01205 -  Addressing a total of 4 or more elements         Clinical Presentation: (criteria)  Stable - 07481     On examination of body system using standardized tests and measures patient presents with 3 or more elements from any of  the following: body structures and functions, activity limitations, and/or participation restrictions.  Leading to a clinical presentation that is considered stable and/or uncomplicated                              Clinical Decision Making  (Eval Complexity):  Low- 43523     Time Tracking:     PT Received On: 08/27/18  PT Start Time: 1358     PT Stop Time: 1416  PT Total Time (min): 18 min with OT    Billable Minutes: Re-eval 18      Ileana Wang, PT  08/27/2018

## 2018-08-28 LAB
ALBUMIN SERPL BCP-MCNC: 2.1 G/DL
ALP SERPL-CCNC: 106 U/L
ALT SERPL W/O P-5'-P-CCNC: 22 U/L
ANION GAP SERPL CALC-SCNC: 9 MMOL/L
AST SERPL-CCNC: 41 U/L
BACTERIA SPEC AEROBE CULT: NORMAL
BACTERIA SPEC AEROBE CULT: NORMAL
BASOPHILS # BLD AUTO: 0.02 K/UL
BASOPHILS NFR BLD: 0.1 %
BILIRUB SERPL-MCNC: 0.7 MG/DL
BUN SERPL-MCNC: 12 MG/DL
C DIFF GDH STL QL: POSITIVE
C DIFF TOX A+B STL QL IA: NEGATIVE
CALCIUM SERPL-MCNC: 8.2 MG/DL
CHLORIDE SERPL-SCNC: 99 MMOL/L
CO2 SERPL-SCNC: 27 MMOL/L
CREAT SERPL-MCNC: 1.4 MG/DL
DIFFERENTIAL METHOD: ABNORMAL
EOSINOPHIL # BLD AUTO: 0 K/UL
EOSINOPHIL NFR BLD: 0 %
ERYTHROCYTE [DISTWIDTH] IN BLOOD BY AUTOMATED COUNT: 16.1 %
EST. GFR  (AFRICAN AMERICAN): 46 ML/MIN/1.73 M^2
EST. GFR  (NON AFRICAN AMERICAN): 40 ML/MIN/1.73 M^2
GLUCOSE SERPL-MCNC: 236 MG/DL
HCT VFR BLD AUTO: 26.4 %
HGB BLD-MCNC: 8 G/DL
LYMPHOCYTES # BLD AUTO: 0.7 K/UL
LYMPHOCYTES NFR BLD: 2.8 %
MAGNESIUM SERPL-MCNC: 1.8 MG/DL
MCH RBC QN AUTO: 24.2 PG
MCHC RBC AUTO-ENTMCNC: 30.3 G/DL
MCV RBC AUTO: 80 FL
MONOCYTES # BLD AUTO: 1.3 K/UL
MONOCYTES NFR BLD: 5 %
NEUTROPHILS # BLD AUTO: 22.9 K/UL
NEUTROPHILS NFR BLD: 91.5 %
PHOSPHATE SERPL-MCNC: 2.6 MG/DL
PLATELET # BLD AUTO: 281 K/UL
PMV BLD AUTO: 9.2 FL
POCT GLUCOSE: 149 MG/DL (ref 70–110)
POCT GLUCOSE: 168 MG/DL (ref 70–110)
POCT GLUCOSE: 171 MG/DL (ref 70–110)
POCT GLUCOSE: 229 MG/DL (ref 70–110)
POTASSIUM SERPL-SCNC: 3.9 MMOL/L
PROT SERPL-MCNC: 5.7 G/DL
RBC # BLD AUTO: 3.31 M/UL
SODIUM SERPL-SCNC: 135 MMOL/L
WBC # BLD AUTO: 25 K/UL

## 2018-08-28 PROCEDURE — 84100 ASSAY OF PHOSPHORUS: CPT

## 2018-08-28 PROCEDURE — 63600175 PHARM REV CODE 636 W HCPCS: Performed by: STUDENT IN AN ORGANIZED HEALTH CARE EDUCATION/TRAINING PROGRAM

## 2018-08-28 PROCEDURE — 86580 TB INTRADERMAL TEST: CPT | Performed by: STUDENT IN AN ORGANIZED HEALTH CARE EDUCATION/TRAINING PROGRAM

## 2018-08-28 PROCEDURE — 63600175 PHARM REV CODE 636 W HCPCS: Performed by: FAMILY MEDICINE

## 2018-08-28 PROCEDURE — 83735 ASSAY OF MAGNESIUM: CPT

## 2018-08-28 PROCEDURE — 85025 COMPLETE CBC W/AUTO DIFF WBC: CPT

## 2018-08-28 PROCEDURE — 25000242 PHARM REV CODE 250 ALT 637 W/ HCPCS: Performed by: STUDENT IN AN ORGANIZED HEALTH CARE EDUCATION/TRAINING PROGRAM

## 2018-08-28 PROCEDURE — 11000001 HC ACUTE MED/SURG PRIVATE ROOM

## 2018-08-28 PROCEDURE — 25000003 PHARM REV CODE 250: Performed by: HOSPITALIST

## 2018-08-28 PROCEDURE — 27000221 HC OXYGEN, UP TO 24 HOURS

## 2018-08-28 PROCEDURE — 25000003 PHARM REV CODE 250: Performed by: STUDENT IN AN ORGANIZED HEALTH CARE EDUCATION/TRAINING PROGRAM

## 2018-08-28 PROCEDURE — 87449 NOS EACH ORGANISM AG IA: CPT

## 2018-08-28 PROCEDURE — 25000003 PHARM REV CODE 250: Performed by: OPHTHALMOLOGY

## 2018-08-28 PROCEDURE — 87493 C DIFF AMPLIFIED PROBE: CPT

## 2018-08-28 PROCEDURE — 94799 UNLISTED PULMONARY SVC/PX: CPT

## 2018-08-28 PROCEDURE — 94761 N-INVAS EAR/PLS OXIMETRY MLT: CPT

## 2018-08-28 PROCEDURE — 63600175 PHARM REV CODE 636 W HCPCS: Performed by: SURGERY

## 2018-08-28 PROCEDURE — 25000003 PHARM REV CODE 250: Performed by: FAMILY MEDICINE

## 2018-08-28 PROCEDURE — 94640 AIRWAY INHALATION TREATMENT: CPT

## 2018-08-28 PROCEDURE — 80053 COMPREHEN METABOLIC PANEL: CPT

## 2018-08-28 PROCEDURE — A4216 STERILE WATER/SALINE, 10 ML: HCPCS | Performed by: STUDENT IN AN ORGANIZED HEALTH CARE EDUCATION/TRAINING PROGRAM

## 2018-08-28 PROCEDURE — S0030 INJECTION, METRONIDAZOLE: HCPCS | Performed by: HOSPITALIST

## 2018-08-28 RX ORDER — VANCOMYCIN HCL IN 5 % DEXTROSE 1G/250ML
1000 PLASTIC BAG, INJECTION (ML) INTRAVENOUS
Status: DISCONTINUED | OUTPATIENT
Start: 2018-08-28 | End: 2018-08-28

## 2018-08-28 RX ADMIN — PIPERACILLIN AND TAZOBACTAM 4.5 G: 4; .5 INJECTION, POWDER, LYOPHILIZED, FOR SOLUTION INTRAVENOUS; PARENTERAL at 01:08

## 2018-08-28 RX ADMIN — TUBERCULIN PURIFIED PROTEIN DERIVATIVE 5 UNITS: 5 INJECTION INTRADERMAL at 02:08

## 2018-08-28 RX ADMIN — DILTIAZEM HYDROCHLORIDE 180 MG: 180 CAPSULE, EXTENDED RELEASE ORAL at 09:08

## 2018-08-28 RX ADMIN — METRONIDAZOLE 500 MG: 500 INJECTION, SOLUTION INTRAVENOUS at 06:08

## 2018-08-28 RX ADMIN — IPRATROPIUM BROMIDE AND ALBUTEROL SULFATE 3 ML: .5; 3 SOLUTION RESPIRATORY (INHALATION) at 04:08

## 2018-08-28 RX ADMIN — TRAMADOL HYDROCHLORIDE 50 MG: 50 TABLET, COATED ORAL at 09:08

## 2018-08-28 RX ADMIN — PIPERACILLIN AND TAZOBACTAM 4.5 G: 4; .5 INJECTION, POWDER, LYOPHILIZED, FOR SOLUTION INTRAVENOUS; PARENTERAL at 06:08

## 2018-08-28 RX ADMIN — FAMOTIDINE 20 MG: 20 TABLET, FILM COATED ORAL at 09:08

## 2018-08-28 RX ADMIN — Medication 3 ML: at 02:08

## 2018-08-28 RX ADMIN — PREDNISONE 20 MG: 20 TABLET ORAL at 09:08

## 2018-08-28 RX ADMIN — TOBRAMYCIN AND DEXAMETHASONE 1 DROP: 3; 1 SUSPENSION/ DROPS OPHTHALMIC at 09:08

## 2018-08-28 RX ADMIN — LIDOCAINE 1 PATCH: 50 PATCH CUTANEOUS at 01:08

## 2018-08-28 RX ADMIN — TRAMADOL HYDROCHLORIDE 50 MG: 50 TABLET, COATED ORAL at 12:08

## 2018-08-28 RX ADMIN — TOBRAMYCIN AND DEXAMETHASONE 1 DROP: 3; 1 SUSPENSION/ DROPS OPHTHALMIC at 01:08

## 2018-08-28 RX ADMIN — HYPROMELLOSE 2910 1 DROP: 5 SOLUTION OPHTHALMIC at 06:08

## 2018-08-28 RX ADMIN — TOBRAMYCIN AND DEXAMETHASONE 1 DROP: 3; 1 SUSPENSION/ DROPS OPHTHALMIC at 06:08

## 2018-08-28 RX ADMIN — TOBRAMYCIN AND DEXAMETHASONE 1 DROP: 3; 1 SUSPENSION/ DROPS OPHTHALMIC at 10:08

## 2018-08-28 RX ADMIN — INSULIN ASPART 2 UNITS: 100 INJECTION, SOLUTION INTRAVENOUS; SUBCUTANEOUS at 02:08

## 2018-08-28 RX ADMIN — INSULIN ASPART 2 UNITS: 100 INJECTION, SOLUTION INTRAVENOUS; SUBCUTANEOUS at 06:08

## 2018-08-28 RX ADMIN — Medication 3 ML: at 06:08

## 2018-08-28 RX ADMIN — ASPIRIN 81 MG: 81 TABLET, COATED ORAL at 09:08

## 2018-08-28 RX ADMIN — IPRATROPIUM BROMIDE AND ALBUTEROL SULFATE 3 ML: .5; 3 SOLUTION RESPIRATORY (INHALATION) at 11:08

## 2018-08-28 RX ADMIN — IPRATROPIUM BROMIDE AND ALBUTEROL SULFATE 3 ML: .5; 3 SOLUTION RESPIRATORY (INHALATION) at 07:08

## 2018-08-28 RX ADMIN — INSULIN ASPART 1 UNITS: 100 INJECTION, SOLUTION INTRAVENOUS; SUBCUTANEOUS at 09:08

## 2018-08-28 RX ADMIN — ATORVASTATIN CALCIUM 40 MG: 40 TABLET, FILM COATED ORAL at 09:08

## 2018-08-28 RX ADMIN — IPRATROPIUM BROMIDE AND ALBUTEROL SULFATE 3 ML: .5; 3 SOLUTION RESPIRATORY (INHALATION) at 08:08

## 2018-08-28 RX ADMIN — HYDROMORPHONE HYDROCHLORIDE 0.25 MG: 2 INJECTION INTRAMUSCULAR; INTRAVENOUS; SUBCUTANEOUS at 03:08

## 2018-08-28 RX ADMIN — INSULIN ASPART 4 UNITS: 100 INJECTION, SOLUTION INTRAVENOUS; SUBCUTANEOUS at 06:08

## 2018-08-28 RX ADMIN — METRONIDAZOLE 500 MG: 500 INJECTION, SOLUTION INTRAVENOUS at 02:08

## 2018-08-28 RX ADMIN — CLOPIDOGREL BISULFATE 75 MG: 75 TABLET ORAL at 09:08

## 2018-08-28 RX ADMIN — INSULIN ASPART 2 UNITS: 100 INJECTION, SOLUTION INTRAVENOUS; SUBCUTANEOUS at 01:08

## 2018-08-28 RX ADMIN — FUROSEMIDE 40 MG: 40 TABLET ORAL at 09:08

## 2018-08-28 RX ADMIN — LOPERAMIDE HYDROCHLORIDE 2 MG: 2 CAPSULE ORAL at 08:08

## 2018-08-28 RX ADMIN — DICLOFENAC: 10 GEL TOPICAL at 09:08

## 2018-08-28 RX ADMIN — IPRATROPIUM BROMIDE AND ALBUTEROL SULFATE 3 ML: .5; 3 SOLUTION RESPIRATORY (INHALATION) at 12:08

## 2018-08-28 RX ADMIN — PIPERACILLIN AND TAZOBACTAM 4.5 G: 4; .5 INJECTION, POWDER, LYOPHILIZED, FOR SOLUTION INTRAVENOUS; PARENTERAL at 09:08

## 2018-08-28 RX ADMIN — ENOXAPARIN SODIUM 30 MG: 100 INJECTION SUBCUTANEOUS at 06:08

## 2018-08-28 NOTE — PROGRESS NOTES
Progress Note  Surgery   Admit Date: 8/22/2018   LOS: 5 days   SUBJECTIVE:   Follow-up For: Appendicitis     Patient seen and examined this AM.    Review of Systems   Constitutional: Negative for chills and fever.   Gastrointestinal: Positive for abdominal pain. Negative for constipation and diarrhea.   Genitourinary: Negative for dysuria.       OBJECTIVE:   Vital Signs (Most Recent)  Temp: 98.2 °F (36.8 °C) (08/28/18 0838)  Pulse: 97 (08/28/18 0838)  Resp: 18 (08/28/18 0838)  BP: 135/76 (08/28/18 0838)  SpO2: 98 % (08/28/18 0804)    I & O (Last 24H):    Intake/Output Summary (Last 24 hours) at 8/28/2018 1047  Last data filed at 8/28/2018 0631  Gross per 24 hour   Intake 1267.31 ml   Output 1425 ml   Net -157.69 ml     Wt Readings from Last 3 Encounters:   08/27/18 86.9 kg (191 lb 9.3 oz)   08/14/18 81.6 kg (180 lb)       Current Diet Order   Procedures    Diet full liquid        Physical Exam   Constitutional: She is well-developed, well-nourished, and in no distress.   HENT:   Head: Normocephalic and atraumatic.   Eyes: Conjunctivae are normal.   Cardiovascular: Normal rate, regular rhythm and normal heart sounds.   Abdominal:   Soft, bowel sounds present TTP near surgical site, no surrounding erythema, surgical incision opening where iodoform packing was in place, no drainage, no discharge    Neurological: She is alert. GCS score is 15.   Skin: Skin is warm and dry. No erythema.   Psychiatric: Affect normal.         Laboratory Data:  CBC  Recent Labs   Lab  08/27/18   0458  08/27/18   1420  08/28/18   0421   WBC  19.56*  29.02*  25.00*   RBC  2.96*  3.56*  3.31*   HGB  6.8*  8.6*  8.0*   HCT  23.7*  28.8*  26.4*   PLT  270  288  281   MCV  80*  81*  80*   MCH  23.0*  24.2*  24.2*   MCHC  28.7*  29.9*  30.3*     CMP  Recent Labs   Lab  08/27/18   0458  08/27/18   1420  08/28/18   0421   CALCIUM  7.9*  8.4*  8.2*   PROT  5.2*  5.8*  5.7*   NA  135*  134*  135*   K  4.0  4.2  3.9   CO2  29  29  27   CL  99  98  99    BUN  11  11  12   CREATININE  2.2*  1.9*  1.4   ALKPHOS  100  114  106   ALT  15  17  22   AST  24  38  41*   BILITOT  0.7  2.0*  0.7     POCT-Glucose  POCT Glucose   Date Value Ref Range Status   08/28/2018 229 (H) 70 - 110 mg/dL Final   08/27/2018 188 (H) 70 - 110 mg/dL Final   08/27/2018 173 (H) 70 - 110 mg/dL Final   08/27/2018 136 (H) 70 - 110 mg/dL Final   08/27/2018 143 (H) 70 - 110 mg/dL Final   08/26/2018 102 70 - 110 mg/dL Final   08/26/2018 134 (H) 70 - 110 mg/dL Final   08/25/2018 217 (H) 70 - 110 mg/dL Final   08/25/2018 238 (H) 70 - 110 mg/dL Final   08/25/2018 153 (H) 70 - 110 mg/dL Final     COAGS  Recent Labs   Lab  08/23/18   0420   INR  0.9   APTT  29.0     MICRO  Microbiology Results (last 7 days)     Procedure Component Value Units Date/Time    Aerobic culture [[id number]]  (Susceptibility) Collected:  08/26/18 1015    Order Status:  Completed Specimen:  Wound from Abdomen Updated:  08/28/18 1005     Aerobic Bacterial Culture --     ESCHERICHIA COLI  Moderate       Aerobic Bacterial Culture --     ENTEROCOCCUS RAFFINOSUS  Moderate  Susceptibility pending      Narrative:       APPENDIX    Culture, Respiratory with Gram Stain [[id number]] Collected:  08/27/18 0752    Order Status:  Completed Specimen:  Respiratory from Endotracheal Aspirate Updated:  08/28/18 0951     Respiratory Culture No Growth     Gram Stain (Respiratory) <10 epithelial cells per low power field.     Gram Stain (Respiratory) Few WBC's     Gram Stain (Respiratory) No organisms seen    Blood culture [[id number]] Collected:  08/23/18 2041    Order Status:  Completed Specimen:  Blood Updated:  08/28/18 0612     Blood Culture, Routine No Growth to date     Blood Culture, Routine No Growth to date     Blood Culture, Routine No Growth to date     Blood Culture, Routine No Growth to date     Blood Culture, Routine No Growth to date    Blood culture [[id number]] Collected:  08/23/18 2041    Order Status:  Completed Specimen:  Blood  Updated:  08/28/18 0612     Blood Culture, Routine No Growth to date     Blood Culture, Routine No Growth to date     Blood Culture, Routine No Growth to date     Blood Culture, Routine No Growth to date     Blood Culture, Routine No Growth to date    Blood culture [[id number]] Collected:  08/27/18 0809    Order Status:  Completed Specimen:  Blood Updated:  08/27/18 1715     Blood Culture, Routine No Growth to date    Blood culture [[id number]] Collected:  08/27/18 0809    Order Status:  Completed Specimen:  Blood Updated:  08/27/18 1715     Blood Culture, Routine No Growth to date    Blood culture [[id number]]     Order Status:  Canceled Specimen:  Blood     Blood culture [[id number]]     Order Status:  Canceled Specimen:  Blood     Culture, Anaerobe [[id number]] Collected:  08/26/18 1015    Order Status:  Completed Specimen:  Wound from Abdomen Updated:  08/27/18 0706     Anaerobic Culture Culture in progress    Narrative:       APPENDIX    Gram stain [[id number]] Collected:  08/26/18 1015    Order Status:  Completed Specimen:  Wound from Abdomen Updated:  08/26/18 1356     Gram Stain Result Moderate WBC's      Many Gram positive cocci      Many Gram positive rods    Narrative:       APPENDIX    Urine culture [[id number]] Collected:  08/25/18 0628    Order Status:  Completed Specimen:  Urine, Clean Catch Updated:  08/26/18 1144     Urine Culture, Routine No growth        LIPID PANEL  Lab Results   Component Value Date    CHOL 159 08/23/2018     Lab Results   Component Value Date    HDL 35 (L) 08/23/2018     Lab Results   Component Value Date    LDLCALC 86.6 08/23/2018     Lab Results   Component Value Date    TRIG 187 (H) 08/23/2018     Lab Results   Component Value Date    CHOLHDL 22.0 08/23/2018         ASSESSMENT/PLAN:   Sheryl Martines is a 63 y.o. female who had an appendectomy for an appendiceal abscess. She was successfully extubated yesterday and moved from  ICU to the Floor. Stable doing well. Will continue  current management. Patient is currently on Vanc and Zosyn day 4 and flagyl day 3    Wound dressing xeroform, 4 x 4 and tape           8/28/2018 D'Amico C Johnson, MD  10:47 AM

## 2018-08-28 NOTE — PLAN OF CARE
TN met with patient at bedside, daughters at bedside. Plan of care reviewed.     PT/OT reccs discussed. SNF recommended at this time pending progress. Patient not agreeable to placement at this time. Patient would rather d/c home with HH.     Patient states she usually has to have needs and DME  Authorized by [facility]. TN to follow up.

## 2018-08-28 NOTE — PT/OT/SLP PROGRESS
Physical Therapy      Patient Name:  Sheryl Martines   MRN:  [id number]  [id number]  Patient not seen today secondary to pt declined treatment this PM since she had pain meds and was trying to rest after she had sat up for ~2 hrs in AM and her pain was a 10/10. Will follow-up .    Rosa Richards, PT

## 2018-08-28 NOTE — PROGRESS NOTES
PGY-2 Progress Note [facility]  Follow up for: open appendectomy     Chief Complaint   Patient presents with    Altered Mental Status     To ER with EMS stating that the family called for altered mental status but was awake and alert when they arrived.  pt with slurred speech and has slept alot today, taking pain medication for an injury one week ago.     Hospital Stay Day 5    Subjective: Afebrile, VSS, good uop, +bm/+flatus, tolerating diet without n/v.  Endorses continued pain at surgical site. Large bowel movement this AM, reported as loose    Denies any CP, SOB, N/V, headaches, fever or chills.     Scheduled Meds:   albuterol-ipratropium  3 mL Nebulization Q4H    aspirin  81 mg Oral Daily    atorvastatin  40 mg Oral Daily    clopidogrel  75 mg Oral Daily    diclofenac sodium   Topical (Top) Daily    diltiaZEM  180 mg Oral Daily    enoxaparin  30 mg Subcutaneous Daily    famotidine  20 mg Oral BID    furosemide  40 mg Oral BID    lidocaine  1 patch Transdermal Q24H    magnesium sulfate IVPB  2 g Intravenous Once    metronidazole  500 mg Intravenous Q8H    piperacillin-tazobactam (ZOSYN) IVPB  4.5 g Intravenous Q8H    predniSONE  20 mg Per OG tube BID    sodium chloride 0.9%  3 mL Intravenous Q8H    tobramycin-dexamethasone 0.3-0.1%  1 drop Both Eyes Q4H While awake     Continuous Infusions:  PRN Meds:acetaminophen, albuterol, albuterol-ipratropium, artificial tears, dextrose 50%, diphenhydrAMINE, glucagon (human recombinant), glucose, glucose, HYDROmorphone, insulin aspart U-100, labetalol, lidocaine-prilocaine, loperamide, ondansetron, traMADol    Review of patient's allergies indicates:   Allergen Reactions    Ace inhibitors Swelling    Corticosteroids (glucocorticoids)     Hydralazine analogues     Tetracyclines Swelling    Travatan (with benzalkonium) [travoprost (benzalkonium)]        Objectives:     Vitals(Most Recent)      BP  Min: 120/65  Max: 166/79  Temp  Av.9 °F (32.2 °C)  Min:  33.8 °F (1 °C)  Max: 100 °F (37.8 °C)  Pulse  Av.2  Min: 85  Max: 114  Resp  Av.7  Min: 15  Max: 43  SpO2  Av.3 %  Min: 89 %  Max: 100 %             Vitals(Lcyv07a)  Temp:  [33.8 °F (1 °C)-100 °F (37.8 °C)]   Pulse:  []   Resp:  [15-43]   BP: (120-166)/(60-98)   SpO2:  [89 %-100 %]     I & O(Mois20e)    Intake/Output Summary (Last 24 hours) at 2018 07  Last data filed at 2018 0631  Gross per 24 hour   Intake 1267.31 ml   Output 1510 ml   Net -242.69 ml     General: AAOx3. NAD. Resting comfortably in bed  HEENT: NCAT. PERRLA. EOMI.   Neck: Supple. No JVD. No LAD.    CV: RRR, no murmur   Chest: Minimal wheezing intermittently at lung bases.     Abd: Soft. ND, TTP at surgical site.  No rebound or guarding.   Ext: no edema appreciated  Skin: Intact. No rash. No lesions.   Neuro: CN II-XII intact. No focal deficit. Strength 5/5 throughout. Sensation intact.   Psych: Good judgement and reason. No A/V hallucinations. NL affect. No abnormal behaviors noted    LABS  CBC  Recent Labs   Lab  18   1420  18   0421   WBC  19.56*  29.02*  25.00*   RBC  2.96*  3.56*  3.31*   HGB  6.8*  8.6*  8.0*   HCT  23.7*  28.8*  26.4*   PLT  270  288  281   MCV  80*  81*  80*   MCH  23.0*  24.2*  24.2*   MCHC  28.7*  29.9*  30.3*     BMP  Recent Labs   Lab  18   1420  18   0421   NA  135*  134*  135*   K  4.0  4.2  3.9   CO2  29  29  27   CL  99  98  99   BUN  11  11  12   CREATININE  2.2*  1.9*  1.4   GLU  128*  183*  236*     POCT-Glucose  POCT Glucose   Date Value Ref Range Status   2018 229 (H) 70 - 110 mg/dL Final   2018 188 (H) 70 - 110 mg/dL Final   2018 173 (H) 70 - 110 mg/dL Final   2018 136 (H) 70 - 110 mg/dL Final   2018 143 (H) 70 - 110 mg/dL Final   2018 102 70 - 110 mg/dL Final   2018 134 (H) 70 - 110 mg/dL Final   2018 217 (H) 70 - 110 mg/dL Final   2018 238 (H) 70 - 110 mg/dL Final    08/25/2018 153 (H) 70 - 110 mg/dL Final     Recent Labs   Lab  08/26/18   0433   08/27/18   0458  08/27/18   1420  08/28/18   0421   CALCIUM  8.5*   < >  7.9*  8.4*  8.2*   MG  1.1*   --   0.9*  1.7  1.8   PHOS  2.8   --   2.8   --   2.6*    < > = values in this interval not displayed.     LFT  Recent Labs   Lab  08/27/18   0458  08/27/18   1420  08/28/18   0421   PROT  5.2*  5.8*  5.7*   ALBUMIN  2.2*  2.3*  2.1*   BILITOT  0.7  2.0*  0.7   AST  24  38  41*   ALKPHOS  100  114  106   ALT  15  17  22     COAGS  Recent Labs   Lab  08/23/18   0420   INR  0.9   APTT  29.0     CE  Recent Labs   Lab  08/23/18   0420  08/23/18   1930  08/25/18   0624   TROPONINI  0.267*  0.233*  0.098*     BNP  Recent Labs   Lab  08/23/18   0217  08/26/18   1550   BNP  25  45     LAST HbA1c  Lab Results   Component Value Date    HGBA1C 8.1 (H) 08/23/2018     Imaging- no new imaging     Assessment/Plan:   63 y.o. 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  Patient went to OR yesterday AM for appendectomy. Patient on Vanc and zosyn day 4 & Flagyl day 3. Patient remains intubated after surgery, was successfully extubated yesterday AM.     Appendiceal Abscess  - Blood cultures x 2 - Negative   - POD#2 for open appendectomy   - Abx- vanc, zosyn, and flagyl   - Surgical site dressing changed yesterday by surgery     NIKHIL  - BUN/Crea of 26/2.5 on admit  - improved to 16/1.3 on 8/25  - BUN/Crea of 12/1.4 this AM   - continue to monitor     CVA    CT head is negative for acute hemorrhagic event  Anti-platelet therapy: ASA 81/Plavix 75mg   Atorvastatin 40mg PO daily  Q4 nuero checks  PT/OT/ST  Fall precautions  Drug screen pos for opiates      Carotid Doppler ordered- Allowing for limitation, 50-69% right ICA stenosis with less than 50% left ICA stenosis.  Antegrade vertebral arteries bilaterally.     2D echo with bubble study- 1 - Concentric remodeling.      2 - No wall motion abnormalities.     3 - Normal left ventricular systolic function (EF 60-65%).     4 - Impaired LV relaxation, normal LAP (grade 1 diastolic dysfunction).     5 - Normal right ventricular systolic function .     6 - The estimated PA systolic pressure is 14 mmHg.     7 - No evidence of intracardiac shunt.      EEG performed- encephalopathy      CTA head and neck Unremarkable and without evidence for proximal significant stenosis or occlusion.     HTN  Clonidine .1mg PO BID  Lasix 40mg PO BID  Diltiazem 180mg ER PO daily      DM2  -Patient on moderate dose SSI     CAD  - Continue ASA and plavix and Atorvastatin      Elevated troponin- resolving   - 0.273-->0.267--> 0.233--> .098         PT/OT: ordered and following  Code: full  Diet: full liquid, advance as tolerated   Ppx: dvt: lovenox   Dispo: f/u further surgery recs      Case discussed with staff    Leta Jovel MD  [facility] HO2  08/28/2018

## 2018-08-28 NOTE — PLAN OF CARE
Problem: Patient Care Overview  Goal: Plan of Care Review  Outcome: Ongoing (interventions implemented as appropriate)  Pt arrived from ICU via bed. Report and orders received. Daughter at the bedside. Antibiotic continued. Dressing changed, wound continues to drain at the staple site. Pt voided post catheter removal. 1 BM loose. Paain only when moving and palpation. No N/V, SOB, distress. NSR on heart monitor. Vitals stable.

## 2018-08-28 NOTE — PROGRESS NOTES
made phone contact with long term care services and completed a level of care eligibility tool (LOCET) for nursing facility admission approval.

## 2018-08-28 NOTE — PLAN OF CARE
Problem: Patient Care Overview  Goal: Plan of Care Review  Outcome: Ongoing (interventions implemented as appropriate)  Patient on oxygen with documented flow.  Will attempt to wean per O2 order protocol.  The proper method of use, as well as anticipated side effects, of this aerosol treatment are discussed and demonstrated to the patient.   Will continue to monitor.

## 2018-08-28 NOTE — NURSING TRANSFER
Nursing Transfer Note      8/28/2018     Transfer To: room 526    Transfer via bed    Transfer with  to O2, cardiac monitoring    Transported by RN x 2    Medicines sent: insulin aspart pen, eye drops, and topical ointments    Chart send with patient: Yes    Notified: daughter at bedside    Patient reassessed at:  8/28 at 0230    Upon arrival to floor: cardiac monitor applied, patient oriented to room, call bell in reach and bed in lowest position

## 2018-08-28 NOTE — PROGRESS NOTES
"[facility] Pulmonary/Critical Care Fellow Progress Note    Subjective:      Extubated yesterday and stepped down.  Doing well today.  On 2LNC.  Says breathing feels fine, denies chest pain, fevers, chills.  Has had 7-8 bouts of watery diarrhea.  No nausea.  Has mild wheeze that she suffers from chronically.  Did not wear her CPAP last night, was intolerant of it.     Objective:   24-hour Vitals:  Temp:  [33.8 °F (1 °C)-100 °F (37.8 °C)] 98.2 °F (36.8 °C)  Pulse:  [] 97  Resp:  [18-38] 18  SpO2:  [89 %-100 %] 98 %  BP: (122-166)/(60-98) 135/76    Physical Examination:  Vitals: /76   Pulse 97   Temp 98.2 °F (36.8 °C)   Resp 18   Ht 5' 1" (1.549 m)   Wt 86.9 kg (191 lb 9.3 oz)   SpO2 98%   Breastfeeding? No   BMI 36.20 kg/m²     General: Awake, alert, NAD, conversant without increased WOB on NC  HEENT: MMM, EOMI, PERRL  CV: RRR, normal S1/S2, no MRG  Chest: Faint end expiratory wheeze bilaterally  Abdomen: Soft, surgical site clean and dry  Extremities: No cyanosis, clubbing, edema, 2+ pulses throughout  Neuro: AOx3, no focal deficits    Laboratory:  Trended Lab Data:  Recent Labs   Lab  08/27/18   0458  08/27/18   1420  08/28/18   0421   WBC  19.56*  29.02*  25.00*   HGB  6.8*  8.6*  8.0*   HCT  23.7*  28.8*  26.4*   PLT  270  288  281       Recent Labs   Lab  08/26/18   0433   08/27/18   0458  08/27/18   1420  08/28/18   0421   NA  136   < >  135*  134*  135*   K  3.4*   < >  4.0  4.2  3.9   CL  95   < >  99  98  99   CO2  34*   < >  29  29  27   BUN  10   < >  11  11  12   CREATININE  1.1   < >  2.2*  1.9*  1.4   GLU  150*   < >  128*  183*  236*   CALCIUM  8.5*   < >  7.9*  8.4*  8.2*   MG  1.1*   --   0.9*  1.7  1.8   PHOS  2.8   --   2.8   --   2.6*    < > = values in this interval not displayed.       Recent Labs   Lab  08/27/18   0458  08/27/18   1420  08/28/18   0421   PROT  5.2*  5.8*  5.7*   ALBUMIN  2.2*  2.3*  2.1*   BILITOT  0.7  2.0*  0.7   AST  24  38  41*   ALT  15  17  22   ALKPHOS  100  " 114  106       Recent Labs   Lab  08/23/18   0420   INR  0.9       Cardiac:   Recent Labs   Lab  08/23/18   0217  08/23/18   0420  08/23/18   1930  08/25/18   0624  08/26/18   1550   TROPONINI   --   0.267*  0.233*  0.098*   --    BNP  25   --    --    --   45       FLP:   Lab Results   Component Value Date    CHOL 159 08/23/2018    HDL 35 (L) 08/23/2018    LDLCALC 86.6 08/23/2018    TRIG 187 (H) 08/23/2018    CHOLHDL 22.0 08/23/2018     DM:   Lab Results   Component Value Date    HGBA1C 8.1 (H) 08/23/2018    LDLCALC 86.6 08/23/2018    CREATININE 1.4 08/28/2018     Thyroid:   Lab Results   Component Value Date    TSH 0.902 08/22/2018     Anemia:   Lab Results   Component Value Date    IRON 11 (L) 08/23/2018    TIBC 262 08/23/2018    FERRITIN 78 08/23/2018    [id number] 1059 (H) 08/23/2018    FOLATE 11.0 08/23/2018     Urinalysis:   Lab Results   Component Value Date    LABURIN No growth 08/25/2018    COLORU Yellow 08/22/2018    SPECGRAV >=1.030 (A) 08/22/2018    NITRITE Negative 08/22/2018    KETONESU Negative 08/22/2018    UROBILINOGEN 1.0 08/22/2018       Microbiology Data:  Microbiology Results (last 7 days)     Procedure Component Value Units Date/Time    Clostridium difficile EIA [[id number]]     Order Status:  No result Specimen:  Stool     Aerobic culture [[id number]]  (Susceptibility) Collected:  08/26/18 1015    Order Status:  Completed Specimen:  Wound from Abdomen Updated:  08/28/18 1005     Aerobic Bacterial Culture --     ESCHERICHIA COLI  Moderate       Aerobic Bacterial Culture --     ENTEROCOCCUS RAFFINOSUS  Moderate  Susceptibility pending      Narrative:       APPENDIX    Culture, Respiratory with Gram Stain [[id number]] Collected:  08/27/18 0752    Order Status:  Completed Specimen:  Respiratory from Endotracheal Aspirate Updated:  08/28/18 0951     Respiratory Culture No Growth     Gram Stain (Respiratory) <10 epithelial cells per low power field.     Gram Stain (Respiratory) Few WBC's     Gram  Stain (Respiratory) No organisms seen    Blood culture [[id number]] Collected:  08/23/18 2041    Order Status:  Completed Specimen:  Blood Updated:  08/28/18 0612     Blood Culture, Routine No Growth to date     Blood Culture, Routine No Growth to date     Blood Culture, Routine No Growth to date     Blood Culture, Routine No Growth to date     Blood Culture, Routine No Growth to date    Blood culture [[id number]] Collected:  08/23/18 2041    Order Status:  Completed Specimen:  Blood Updated:  08/28/18 0612     Blood Culture, Routine No Growth to date     Blood Culture, Routine No Growth to date     Blood Culture, Routine No Growth to date     Blood Culture, Routine No Growth to date     Blood Culture, Routine No Growth to date    Blood culture [[id number]] Collected:  08/27/18 0809    Order Status:  Completed Specimen:  Blood Updated:  08/27/18 1715     Blood Culture, Routine No Growth to date    Blood culture [[id number]] Collected:  08/27/18 0809    Order Status:  Completed Specimen:  Blood Updated:  08/27/18 1715     Blood Culture, Routine No Growth to date    Blood culture [[id number]]     Order Status:  Canceled Specimen:  Blood     Blood culture [[id number]]     Order Status:  Canceled Specimen:  Blood     Culture, Anaerobe [[id number]] Collected:  08/26/18 1015    Order Status:  Completed Specimen:  Wound from Abdomen Updated:  08/27/18 0706     Anaerobic Culture Culture in progress    Narrative:       APPENDIX    Gram stain [[id number]] Collected:  08/26/18 1015    Order Status:  Completed Specimen:  Wound from Abdomen Updated:  08/26/18 1356     Gram Stain Result Moderate WBC's      Many Gram positive cocci      Many Gram positive rods    Narrative:       APPENDIX    Urine culture [[id number]] Collected:  08/25/18 0628    Order Status:  Completed Specimen:  Urine, Clean Catch Updated:  08/26/18 1144     Urine Culture, Routine No growth        Current Medications:     Infusions:       Scheduled:    albuterol-ipratropium  3 mL Nebulization Q4H    aspirin  81 mg Oral Daily    atorvastatin  40 mg Oral Daily    clopidogrel  75 mg Oral Daily    diclofenac sodium   Topical (Top) Daily    diltiaZEM  180 mg Oral Daily    enoxaparin  30 mg Subcutaneous Daily    famotidine  20 mg Oral BID    furosemide  40 mg Oral BID    lidocaine  1 patch Transdermal Q24H    magnesium sulfate IVPB  2 g Intravenous Once    metronidazole  500 mg Intravenous Q8H    piperacillin-tazobactam (ZOSYN) IVPB  4.5 g Intravenous Q8H    predniSONE  20 mg Per OG tube BID    sodium chloride 0.9%  3 mL Intravenous Q8H    tobramycin-dexamethasone 0.3-0.1%  1 drop Both Eyes Q4H While awake    vancomycin (VANCOCIN) IVPB  1,000 mg Intravenous Q24H        PRN:  acetaminophen, albuterol, albuterol-ipratropium, artificial tears, dextrose 50%, diphenhydrAMINE, glucagon (human recombinant), glucose, glucose, HYDROmorphone, insulin aspart U-100, labetalol, lidocaine-prilocaine, loperamide, ondansetron, traMADol     Assessment:     Sheryl Martines is a 63 y.o.female with  Patient Active Problem List    Diagnosis Date Noted    Appendicitis with peritonitis 08/26/2018    NIKHIL (acute kidney injury)     Elevated troponin     Altered mental status 08/23/2018    DM (diabetes mellitus) 08/23/2018    HTN (hypertension) 08/23/2018    CAD (coronary artery disease) 08/23/2018    Closed compression fracture of fourth lumbar vertebra 08/23/2018    Opiate use 08/23/2018    Slurred speech 08/23/2018    CVA, old, hemiparesis 08/23/2018    CVA (cerebral vascular accident) 08/23/2018        Plan:     63yoF with hx of asthma/COPD, CAD, DM2, HTN who presented with AMS and was found to have appendicitis s/p appendectomy on 8/26.  Was extubated post-op and had to be re-intubated for respiratory failure.  Extubated on 8/27 and doing well on NC presently.    1. Acute Hypoxic Respiratory Failure Requiring MV  2. Asthma/COPD  3. Appendicitis s/p Appendectomy  POD #1  4. Mild Troponin Elevation  5. NIKHIL, Suspect ATN  6. Acute Blood Loss Anemia  7. Diarrhea    - Intolerant of CPAP last night, recommend outpatient PSG and continue nocturnal O2 (2LNC)  - Keep her euvolemic w/ PRN diuretics, net even from yesterday  - Continue nebs as needed, no evidence of exacerbation through she has a mild chronic wheeze  - Unknown PFTs, she is on Advair (high dose), Theophylline and chronic Prednisone (10mg daily) - OK with higher dose steroids major-operatively to avoid adrenal crisis but would wean back to her home 10mg by tomorrow  - May be a candidate for LAMA or immunologic therapy as an outpatient - defer to her Pulmonologist  - Cr has improved, likely pre-renal NIKHIL in the context of blood loss and surgery  - Troponin w/ flat profile, no chest pain, monitor  - Continue broad abx, do not think Flagyl is adding much to the regimen, can likely discontinue this  - Check C-Diff as she is on abx, if positive would need oral Vanc as that is now the first like recommendation  - Growing Enterococcus and E. Coli - continue Vanc/Pip-Tazo    VTE PPx: Lovenox  GI PPx: Famotidine (on steroids, septic)  Code Status: Full    We will sign off, please call with questions    Bryan Magana  [facility] Pulmonary/Critical Care Fellow

## 2018-08-29 LAB
ALBUMIN SERPL BCP-MCNC: 2.1 G/DL
ALP SERPL-CCNC: 122 U/L
ALT SERPL W/O P-5'-P-CCNC: 23 U/L
ANION GAP SERPL CALC-SCNC: 8 MMOL/L
ANISOCYTOSIS BLD QL SMEAR: SLIGHT
AST SERPL-CCNC: 33 U/L
BACTERIA BLD CULT: NORMAL
BACTERIA BLD CULT: NORMAL
BACTERIA SPEC AEROBE CULT: NORMAL
BASOPHILS # BLD AUTO: ABNORMAL K/UL
BASOPHILS NFR BLD: 0 %
BILIRUB SERPL-MCNC: 0.3 MG/DL
BUN SERPL-MCNC: 15 MG/DL
C DIFF TOX GENS STL QL NAA+PROBE: POSITIVE
CALCIUM SERPL-MCNC: 8.4 MG/DL
CHLORIDE SERPL-SCNC: 102 MMOL/L
CO2 SERPL-SCNC: 31 MMOL/L
CREAT SERPL-MCNC: 1.1 MG/DL
DIFFERENTIAL METHOD: ABNORMAL
EOSINOPHIL # BLD AUTO: ABNORMAL K/UL
EOSINOPHIL NFR BLD: 0 %
ERYTHROCYTE [DISTWIDTH] IN BLOOD BY AUTOMATED COUNT: 16.3 %
EST. GFR  (AFRICAN AMERICAN): >60 ML/MIN/1.73 M^2
EST. GFR  (NON AFRICAN AMERICAN): 54 ML/MIN/1.73 M^2
GLUCOSE SERPL-MCNC: 130 MG/DL
GRAM STN SPEC: NORMAL
HCT VFR BLD AUTO: 26.1 %
HGB BLD-MCNC: 7.9 G/DL
HYPOCHROMIA BLD QL SMEAR: ABNORMAL
LYMPHOCYTES # BLD AUTO: ABNORMAL K/UL
LYMPHOCYTES NFR BLD: 6 %
MAGNESIUM SERPL-MCNC: 1.8 MG/DL
MCH RBC QN AUTO: 24.5 PG
MCHC RBC AUTO-ENTMCNC: 30.3 G/DL
MCV RBC AUTO: 81 FL
MONOCYTES # BLD AUTO: ABNORMAL K/UL
MONOCYTES NFR BLD: 0 %
NEUTROPHILS NFR BLD: 84 %
NEUTS BAND NFR BLD MANUAL: 10 %
OVALOCYTES BLD QL SMEAR: ABNORMAL
PHOSPHATE SERPL-MCNC: 2.3 MG/DL
PLATELET # BLD AUTO: 300 K/UL
PLATELET BLD QL SMEAR: ABNORMAL
PMV BLD AUTO: 9.3 FL
POCT GLUCOSE: 162 MG/DL (ref 70–110)
POCT GLUCOSE: 162 MG/DL (ref 70–110)
POCT GLUCOSE: 166 MG/DL (ref 70–110)
POCT GLUCOSE: 182 MG/DL (ref 70–110)
POIKILOCYTOSIS BLD QL SMEAR: SLIGHT
POLYCHROMASIA BLD QL SMEAR: ABNORMAL
POTASSIUM SERPL-SCNC: 3.7 MMOL/L
PROT SERPL-MCNC: 6.1 G/DL
RBC # BLD AUTO: 3.23 M/UL
SODIUM SERPL-SCNC: 141 MMOL/L
VANCOMYCIN SERPL-MCNC: 6.3 UG/ML
WBC # BLD AUTO: 23.9 K/UL

## 2018-08-29 PROCEDURE — 27000646 HC AEROBIKA DEVICE

## 2018-08-29 PROCEDURE — 94664 DEMO&/EVAL PT USE INHALER: CPT

## 2018-08-29 PROCEDURE — 27100171 HC OXYGEN HIGH FLOW UP TO 24 HOURS

## 2018-08-29 PROCEDURE — 25000003 PHARM REV CODE 250: Performed by: FAMILY MEDICINE

## 2018-08-29 PROCEDURE — 11000001 HC ACUTE MED/SURG PRIVATE ROOM

## 2018-08-29 PROCEDURE — 25000242 PHARM REV CODE 250 ALT 637 W/ HCPCS: Performed by: STUDENT IN AN ORGANIZED HEALTH CARE EDUCATION/TRAINING PROGRAM

## 2018-08-29 PROCEDURE — 80053 COMPREHEN METABOLIC PANEL: CPT

## 2018-08-29 PROCEDURE — S0030 INJECTION, METRONIDAZOLE: HCPCS | Performed by: HOSPITALIST

## 2018-08-29 PROCEDURE — 27000221 HC OXYGEN, UP TO 24 HOURS

## 2018-08-29 PROCEDURE — 94640 AIRWAY INHALATION TREATMENT: CPT

## 2018-08-29 PROCEDURE — 85007 BL SMEAR W/DIFF WBC COUNT: CPT

## 2018-08-29 PROCEDURE — 63600175 PHARM REV CODE 636 W HCPCS: Performed by: STUDENT IN AN ORGANIZED HEALTH CARE EDUCATION/TRAINING PROGRAM

## 2018-08-29 PROCEDURE — 99900035 HC TECH TIME PER 15 MIN (STAT)

## 2018-08-29 PROCEDURE — A4216 STERILE WATER/SALINE, 10 ML: HCPCS | Performed by: STUDENT IN AN ORGANIZED HEALTH CARE EDUCATION/TRAINING PROGRAM

## 2018-08-29 PROCEDURE — 85027 COMPLETE CBC AUTOMATED: CPT

## 2018-08-29 PROCEDURE — 97530 THERAPEUTIC ACTIVITIES: CPT

## 2018-08-29 PROCEDURE — 84100 ASSAY OF PHOSPHORUS: CPT

## 2018-08-29 PROCEDURE — 94799 UNLISTED PULMONARY SVC/PX: CPT

## 2018-08-29 PROCEDURE — 63600175 PHARM REV CODE 636 W HCPCS: Performed by: SURGERY

## 2018-08-29 PROCEDURE — 94761 N-INVAS EAR/PLS OXIMETRY MLT: CPT

## 2018-08-29 PROCEDURE — 80202 ASSAY OF VANCOMYCIN: CPT

## 2018-08-29 PROCEDURE — 25000003 PHARM REV CODE 250: Performed by: STUDENT IN AN ORGANIZED HEALTH CARE EDUCATION/TRAINING PROGRAM

## 2018-08-29 PROCEDURE — 63600175 PHARM REV CODE 636 W HCPCS: Performed by: FAMILY MEDICINE

## 2018-08-29 PROCEDURE — 25000003 PHARM REV CODE 250: Performed by: OPHTHALMOLOGY

## 2018-08-29 PROCEDURE — 27100092 HC HIGH FLOW DELIVERY CANNULA

## 2018-08-29 PROCEDURE — 25000003 PHARM REV CODE 250: Performed by: HOSPITALIST

## 2018-08-29 PROCEDURE — 83735 ASSAY OF MAGNESIUM: CPT

## 2018-08-29 RX ORDER — CIPROFLOXACIN 2 MG/ML
400 INJECTION, SOLUTION INTRAVENOUS
Status: DISCONTINUED | OUTPATIENT
Start: 2018-08-29 | End: 2018-08-30

## 2018-08-29 RX ORDER — VANCOMYCIN HCL IN 5 % DEXTROSE 1G/250ML
15 PLASTIC BAG, INJECTION (ML) INTRAVENOUS
Status: DISCONTINUED | OUTPATIENT
Start: 2018-08-29 | End: 2018-08-29

## 2018-08-29 RX ORDER — LABETALOL HYDROCHLORIDE 5 MG/ML
10 INJECTION, SOLUTION INTRAVENOUS EVERY 4 HOURS PRN
Status: DISCONTINUED | OUTPATIENT
Start: 2018-08-29 | End: 2018-08-31

## 2018-08-29 RX ORDER — IPRATROPIUM BROMIDE AND ALBUTEROL SULFATE 2.5; .5 MG/3ML; MG/3ML
3 SOLUTION RESPIRATORY (INHALATION)
Status: DISCONTINUED | OUTPATIENT
Start: 2018-08-29 | End: 2018-09-05 | Stop reason: HOSPADM

## 2018-08-29 RX ORDER — VANCOMYCIN HCL IN 5 % DEXTROSE 1G/250ML
15 PLASTIC BAG, INJECTION (ML) INTRAVENOUS
Status: DISCONTINUED | OUTPATIENT
Start: 2018-08-29 | End: 2018-08-30

## 2018-08-29 RX ADMIN — TOBRAMYCIN AND DEXAMETHASONE 1 DROP: 3; 1 SUSPENSION/ DROPS OPHTHALMIC at 06:08

## 2018-08-29 RX ADMIN — ONDANSETRON 8 MG: 8 TABLET, ORALLY DISINTEGRATING ORAL at 07:08

## 2018-08-29 RX ADMIN — FUROSEMIDE 40 MG: 40 TABLET ORAL at 08:08

## 2018-08-29 RX ADMIN — ATORVASTATIN CALCIUM 40 MG: 40 TABLET, FILM COATED ORAL at 08:08

## 2018-08-29 RX ADMIN — INSULIN ASPART 2 UNITS: 100 INJECTION, SOLUTION INTRAVENOUS; SUBCUTANEOUS at 05:08

## 2018-08-29 RX ADMIN — INSULIN ASPART 1 UNITS: 100 INJECTION, SOLUTION INTRAVENOUS; SUBCUTANEOUS at 08:08

## 2018-08-29 RX ADMIN — Medication 3 ML: at 01:08

## 2018-08-29 RX ADMIN — IPRATROPIUM BROMIDE AND ALBUTEROL SULFATE 3 ML: .5; 3 SOLUTION RESPIRATORY (INHALATION) at 04:08

## 2018-08-29 RX ADMIN — INSULIN ASPART 2 UNITS: 100 INJECTION, SOLUTION INTRAVENOUS; SUBCUTANEOUS at 06:08

## 2018-08-29 RX ADMIN — IPRATROPIUM BROMIDE AND ALBUTEROL SULFATE 3 ML: .5; 3 SOLUTION RESPIRATORY (INHALATION) at 02:08

## 2018-08-29 RX ADMIN — DICLOFENAC: 10 GEL TOPICAL at 08:08

## 2018-08-29 RX ADMIN — IPRATROPIUM BROMIDE AND ALBUTEROL SULFATE 3 ML: .5; 3 SOLUTION RESPIRATORY (INHALATION) at 12:08

## 2018-08-29 RX ADMIN — FAMOTIDINE 20 MG: 20 TABLET, FILM COATED ORAL at 08:08

## 2018-08-29 RX ADMIN — PREDNISONE 20 MG: 20 TABLET ORAL at 08:08

## 2018-08-29 RX ADMIN — PROMETHAZINE HYDROCHLORIDE 12.5 MG: 25 INJECTION INTRAMUSCULAR; INTRAVENOUS at 12:08

## 2018-08-29 RX ADMIN — TOBRAMYCIN AND DEXAMETHASONE 1 DROP: 3; 1 SUSPENSION/ DROPS OPHTHALMIC at 08:08

## 2018-08-29 RX ADMIN — IPRATROPIUM BROMIDE AND ALBUTEROL SULFATE 3 ML: .5; 3 SOLUTION RESPIRATORY (INHALATION) at 11:08

## 2018-08-29 RX ADMIN — TOBRAMYCIN AND DEXAMETHASONE 1 DROP: 3; 1 SUSPENSION/ DROPS OPHTHALMIC at 01:08

## 2018-08-29 RX ADMIN — LABETALOL HYDROCHLORIDE 10 MG: 5 INJECTION, SOLUTION INTRAVENOUS at 11:08

## 2018-08-29 RX ADMIN — LIDOCAINE 1 PATCH: 50 PATCH CUTANEOUS at 01:08

## 2018-08-29 RX ADMIN — VANCOMYCIN HYDROCHLORIDE 1000 MG: 1 INJECTION, POWDER, LYOPHILIZED, FOR SOLUTION INTRAVENOUS at 02:08

## 2018-08-29 RX ADMIN — PIPERACILLIN AND TAZOBACTAM 4.5 G: 4; .5 INJECTION, POWDER, LYOPHILIZED, FOR SOLUTION INTRAVENOUS; PARENTERAL at 09:08

## 2018-08-29 RX ADMIN — CLOPIDOGREL BISULFATE 75 MG: 75 TABLET ORAL at 08:08

## 2018-08-29 RX ADMIN — IPRATROPIUM BROMIDE AND ALBUTEROL SULFATE 3 ML: .5; 3 SOLUTION RESPIRATORY (INHALATION) at 06:08

## 2018-08-29 RX ADMIN — METRONIDAZOLE 500 MG: 500 INJECTION, SOLUTION INTRAVENOUS at 08:08

## 2018-08-29 RX ADMIN — ASPIRIN 81 MG: 81 TABLET, COATED ORAL at 08:08

## 2018-08-29 RX ADMIN — PIPERACILLIN AND TAZOBACTAM 4.5 G: 4; .5 INJECTION, POWDER, LYOPHILIZED, FOR SOLUTION INTRAVENOUS; PARENTERAL at 04:08

## 2018-08-29 RX ADMIN — METRONIDAZOLE 500 MG: 500 INJECTION, SOLUTION INTRAVENOUS at 01:08

## 2018-08-29 RX ADMIN — HYDROMORPHONE HYDROCHLORIDE 0.25 MG: 2 INJECTION INTRAMUSCULAR; INTRAVENOUS; SUBCUTANEOUS at 04:08

## 2018-08-29 RX ADMIN — DILTIAZEM HYDROCHLORIDE 180 MG: 180 CAPSULE, EXTENDED RELEASE ORAL at 08:08

## 2018-08-29 RX ADMIN — ENOXAPARIN SODIUM 30 MG: 100 INJECTION SUBCUTANEOUS at 05:08

## 2018-08-29 RX ADMIN — TRAMADOL HYDROCHLORIDE 50 MG: 50 TABLET, COATED ORAL at 09:08

## 2018-08-29 RX ADMIN — METRONIDAZOLE 500 MG: 500 INJECTION, SOLUTION INTRAVENOUS at 05:08

## 2018-08-29 RX ADMIN — CIPROFLOXACIN 400 MG: 2 INJECTION, SOLUTION INTRAVENOUS at 05:08

## 2018-08-29 RX ADMIN — Medication 3 ML: at 07:08

## 2018-08-29 RX ADMIN — TOBRAMYCIN AND DEXAMETHASONE 1 DROP: 3; 1 SUSPENSION/ DROPS OPHTHALMIC at 09:08

## 2018-08-29 NOTE — PT/OT/SLP PROGRESS
Occupational Therapy   Treatment    Name: Sheryl Martines  MRN: [id number]  Admitting Diagnosis:  Appendicitis with peritonitis  3 Days Post-Op    Recommendations:     Discharge Recommendations: nursing facility, skilled  Discharge Equipment Recommendations:  (Poss RW)  Barriers to discharge:  Decreased caregiver support, Inaccessible home environment    Subjective     Communicated with: Ashley espinoza prior to session.  Pain/Comfort:  · Pain Rating 1: (did not rate, but endorsed HA, slight dizziness and N/V)    Patients cultural, spiritual, Muslim conflicts given the current situation: (none reported)    Objective:     Patient found with: bed alarm, central line    General Precautions: Standard, fall   Orthopedic Precautions:N/A   Braces: N/A     Bed Mobility:    · Patient completed Scooting/Bridging with stand by assistance and with side rail  · Patient completed Supine to Sit with stand by assistance, with side rail and HOB elevated  · Patient completed Sit to Supine with contact guard assistance     Functional Mobility/Transfers:  · N/A    Activities of Daily Living:  · Grooming: stand by assistance      Patient left HOB elevated with all lines intact, call button in reach, bed alarm on, RN notified and   present    [facility] 6 Click:  [facility] Total Score: 14    Treatment & Education:  Patient reporting not feeling well, but wanted to engage in therapy. Bed mob as noted above. C/o dizziness and nausea. PCT present for vital signs. EOB - 188/100, HR 98; BP taken again 200/91. RN notified and spoke with MD for BP meds. Patient with episodes of vomiting up sputum. Returned to bed level and positioned for comfort.  Education:    Assessment:   Patient with elevated BP this AM - headache, N/V. MD present to see patient. Patient unable to tolerate much activity this date. Will benefit from continued skilled OT to address functional deficits.     Sheryl Martines is a 63 y.o. female with a medical diagnosis of Appendicitis  with peritonitis. Performance deficits affecting function are weakness, impaired endurance, impaired self care skills, impaired functional mobilty, gait instability, impaired balance, decreased upper extremity function, decreased lower extremity function, pain, impaired skin, edema.      Rehab Prognosis:  Fair+; patient would benefit from acute skilled OT services to address these deficits and reach maximum level of function.       Plan:     Patient to be seen 5 x/week to address the above listed problems via self-care/home management, therapeutic activities, therapeutic exercises  · Plan of Care Expires: 09/27/18  · Plan of Care Reviewed with: patient    This Plan of care has been discussed with the patient who was involved in its development and understands and is in agreement with the identified goals and treatment plan    GOALS:   Multidisciplinary Problems     Occupational Therapy Goals        Problem: Occupational Therapy Goal    Goal Priority Disciplines Outcome Interventions   Occupational Therapy Goal     OT, PT/OT Ongoing (interventions implemented as appropriate)    Description:  Goals to be met by: 8/31/2018    Patient will increase functional independence with ADLs by performing:    Feeding with Modified [place].  UE Dressing with Set-up Assistance.  LE Dressing with Minimal Assistance.  Grooming while seated with Set-up Assistance.  Toileting from bedside commode with Minimal Assistance for hygiene and clothing management.   Sitting at edge of bed x20 minutes with Supervision.  Rolling to Bilateral with Modified [place].   Supine to sit with Modified [place].  Stand pivot transfers with Stand-by Assistance.  Step transfer with Supervision and Stand-by Assistance  Toilet transfer to bedside commode with Stand-by Assistance.  Increased functional strength to WFL for BUE.  Upper extremity exercise program 10 reps per handout, with supervision.                      Time Tracking:     OT Date  of Treatment: 08/29/18  OT Start Time: 1130  OT Stop Time: 1200  OT Total Time (min): 30 mins co-tx with PT    Billable Minutes:Self Care/Home Management 15    MANOJ Beebe  8/29/2018

## 2018-08-29 NOTE — NURSING
Notified charge nurse, Renee of patients activity level after administration of phenergan IVPB and labetalol. Renee assessed patient and advised to place yvette in room for safety precautions.

## 2018-08-29 NOTE — PHYSICIAN QUERY
PT Name: Sheryl Martines  MR #: [id number]    Physician Query Form -Systemic Infectious Process Clarification     CDS: Jen Taylor RN  Contact information: [email or web address]    This form is a permanent document in the medical record.     Query Date: August 29, 2018     By submitting this query, we are merely seeking further clarification of documentation. Please utilize your independent clinical judgment when addressing the question(s) below.    The Medical record contains the following:     Indicators   Supporting Clinical Findings   Location in Medical Record   x HR RR BP Temp Vitals(Ottl46m)   Temp:  [98 °F (36.7 °C)-101.7 °F (38.7 °C)]   Pulse:  []   Resp:  [16-26]   BP: ()/(59-74)   SpO2:  [88 %-100 %]    PN 8/24   x Lactic Acid     Procalcitonin 8/23/2018 20:51  Lactate, Dom: 1.1   Lab results   x WBC     Bands  CRP 8/24/2018 12:06  CRP: 366.2     8/23/2018 04:20  Sed Rate: 103    Lab results   x Culture(s) Aerobic Bacterial Culture   ESCHERICHIA COLI   Moderate   Aerobic Bacterial Culture   ENTEROCOCCUS RAFFINOSUS   Moderate   Specimen Type: Wound  Specimen Source: Abdomen  Collected: 8/26/2018 10:15 AM    Lab results   x AMS, Confusion, LOC, etc. Clinical Impression:    The primary encounter diagnosis was Altered mental status, unspecified altered mental status type.     EEG demonstrates triphasic waves concerning for metabolic encephalopathy.   ED provider note 8/22        Neurology PN 8/24   x Organ Dysfunction / Failure Diagnoses of NIKHIL (acute kidney injury) and Elevated troponin were also pertinent to this visit.    Would not give her more volume for now, she has a normal BP and is 4L positive - does not appear floridly overloaded and I suspect she has ATN related to sepsis/surgery - would monitor this   ED provider note 8/22      CCM PN 8/27   x Bacteremia or Sepsis / Septic Sepsis     PN 8/24   x Known or Suspected Source of Infection documented Patient admitted  with possible stroke w/u revealed right Quadrant abscess with appendicitis   Surgery consult 8/25    (Failed) Outpatient Treatment      Medication     x Treatment - Started on 30mL/kg of saline   - started on vanc and zosyn   - blood cultures obtained   - lactate obtained   HM PN 8/24    Other       Provider, please further specify the sepsis diagnosis associated with above clinical findings.    [X] Sepsis due to Ecoli and Enterococcus     [  ] Severe Sepsis due to Ecoli and Enterococcus with Acute Organ Dysfunction/Failure (please specify         organ dysfunction/failure): _______________________    [  ] Other Infectious Disease (please specify): _________________________________    [  ] Other: __________________________________    [  ] Clinically Undetermined    Please document in your progress notes daily for the duration of treatment until resolved and include in your discharge summary.

## 2018-08-29 NOTE — PT/OT/SLP PROGRESS
Occupational Therapy  Missed Visit    Patient Name:  Sheryl Martines   MRN:  [id number]    Patient not seen today secondary to pt declined treatment this PM since she had pain meds and was trying to rest after she had sat up for ~2 hrs in AM and her pain was a 10/10. Will follow-up .    MANOJ Beebe  8/29/2018

## 2018-08-29 NOTE — NURSING
Called family medicine about BP of 200/91 and nausea while sitting up on side of bed with PT.  They advised they would come up and see patient. Parameters for labetalol changed. Administered 2 mL of labetalol. Will continue to monitor patient.

## 2018-08-29 NOTE — NURSING
Patient sats 81% on 2L/NC; 91% on 4L/NC. Notified family medicine of sats and audible wheezing. Advised to give treatment early.

## 2018-08-29 NOTE — PLAN OF CARE
Problem: Patient Care Overview  Goal: Plan of Care Review  Outcome: Ongoing (interventions implemented as appropriate)  Placed in special contact isolation to rule out c diff    Liquid stool attained towards shift end and sent to lab  Results outstanding   Diet advanced  Ate fair  No nausea   Pain control with oral medication  Pain intensifies with activity   Dressing came loose and reapplied towards shift end  Pt reminded not to touch her incision line   Dressing applied to small umbilcal stapled incision site to protect from pt hand   Pt has less strength in right leg  Becomes dyspneic with any activity or anxiety   Oxygen maintained   Occasional wheeze heard   Expectorating light mucus   Telemetry maintained  Sinus rhythm  Sinus tach

## 2018-08-29 NOTE — PROGRESS NOTES
PGY-2 Progress Note [facility]  Follow up for: open appendectomy    Chief Complaint   Patient presents with    Altered Mental Status     To ER with EMS stating that the family called for altered mental status but was awake and alert when they arrived.  pt with slurred speech and has slept alot today, taking pain medication for an injury one week ago.     Hospital Stay Day 6    Subjective: Afebrile VSS, good uop, tolerating diet, reports decreased appetite. Reports SOB at baseline.  Reports pain is tolerable at surgical site    Denies any CP, N/V/D, headaches, fever or chills.     Scheduled Meds:   albuterol-ipratropium  3 mL Nebulization Q4H    aspirin  81 mg Oral Daily    atorvastatin  40 mg Oral Daily    clopidogrel  75 mg Oral Daily    diclofenac sodium   Topical (Top) Daily    diltiaZEM  180 mg Oral Daily    enoxaparin  30 mg Subcutaneous Daily    famotidine  20 mg Oral BID    furosemide  40 mg Oral BID    lidocaine  1 patch Transdermal Q24H    magnesium sulfate IVPB  2 g Intravenous Once    metronidazole  500 mg Intravenous Q8H    piperacillin-tazobactam (ZOSYN) IVPB  4.5 g Intravenous Q8H    predniSONE  20 mg Per OG tube BID    sodium chloride 0.9%  3 mL Intravenous Q8H    tobramycin-dexamethasone 0.3-0.1%  1 drop Both Eyes Q4H While awake     Continuous Infusions:  PRN Meds:acetaminophen, albuterol, albuterol-ipratropium, artificial tears, dextrose 50%, diphenhydrAMINE, glucagon (human recombinant), glucose, glucose, HYDROmorphone, insulin aspart U-100, labetalol, lidocaine-prilocaine, loperamide, ondansetron, traMADol    Review of patient's allergies indicates:   Allergen Reactions    Ace inhibitors Swelling    Corticosteroids (glucocorticoids)     Hydralazine analogues     Tetracyclines Swelling    Travatan (with benzalkonium) [travoprost (benzalkonium)]        Objectives:     Vitals(Most Recent)      BP  Min: 141/84  Max: 172/76  Temp  Av.3 °F (36.8 °C)  Min: 97.3 °F (36.3 °C)  Max:  99.6 °F (37.6 °C)  Pulse  Av.7  Min: 10  Max: 107  Resp  Av.8  Min: 18  Max: 24  SpO2  Av %  Min: 98 %  Max: 100 %  Weight  Av kg (194 lb 0.1 oz)  Min: 88 kg (194 lb 0.1 oz)  Max: 88 kg (194 lb 0.1 oz)             Vitals([id number])  Temp:  [97.3 °F (36.3 °C)-99.6 °F (37.6 °C)]   Pulse:  []   Resp:  [18-24]   BP: (141-172)/(67-84)   SpO2:  [98 %-100 %]     I & O(Cemn81o)    Intake/Output Summary (Last 24 hours) at 2018 0914  Last data filed at 2018 0600  Gross per 24 hour   Intake 830 ml   Output 600 ml   Net 230 ml     General: AAOx3. NAD. Resting comfortably in bed  HEENT: NCAT. PERRLA. EOMI.   Neck: Supple. No JVD. No LAD.    CV: RRR, no murmur   Chest: Minimal wheezing intermittently at lung bases.     Abd: Soft. ND, TTP at surgical site.  No rebound or guarding.   Ext: no edema appreciated  Skin: Intact. No rash. No lesions.   Neuro: CN II-XII intact. No focal deficit. Strength 5/5 throughout. Sensation intact.   Psych: Good judgement and reason. No A/V hallucinations. NL affect. No abnormal behaviors noted    LABS  CBC  Recent Labs   Lab  18   1420  18   0451   WBC  29.02*  25.00*  23.90*   RBC  3.56*  3.31*  3.23*   HGB  8.6*  8.0*  7.9*   HCT  28.8*  26.4*  26.1*   PLT  288  281  300   MCV  81*  80*  81*   MCH  24.2*  24.2*  24.5*   MCHC  29.9*  30.3*  30.3*     BMP  Recent Labs   Lab  18   1420  18   0421  18   0451   NA  134*  135*  141   K  4.2  3.9  3.7   CO2  29  27  31*   CL  98  99  102   BUN  11  12  15   CREATININE  1.9*  1.4  1.1   GLU  183*  236*  130*       POCT-Glucose  POCT Glucose   Date Value Ref Range Status   2018 162 (H) 70 - 110 mg/dL Final   2018 149 (H) 70 - 110 mg/dL Final   2018 171 (H) 70 - 110 mg/dL Final   2018 168 (H) 70 - 110 mg/dL Final   2018 229 (H) 70 - 110 mg/dL Final   2018 188 (H) 70 - 110 mg/dL Final   2018 173 (H) 70 - 110 mg/dL Final   2018 136  (H) 70 - 110 mg/dL Final   08/27/2018 143 (H) 70 - 110 mg/dL Final   08/26/2018 102 70 - 110 mg/dL Final       Recent Labs   Lab  08/27/18   0458  08/27/18   1420  08/28/18   0421  08/29/18   0451   CALCIUM  7.9*  8.4*  8.2*  8.4*   MG  0.9*  1.7  1.8  1.8   PHOS  2.8   --   2.6*  2.3*     LFT  Recent Labs   Lab  08/27/18   1420  08/28/18   0421  08/29/18   0451   PROT  5.8*  5.7*  6.1   ALBUMIN  2.3*  2.1*  2.1*   BILITOT  2.0*  0.7  0.3   AST  38  41*  33   ALKPHOS  114  106  122   ALT  17  22  23     COAGS  Recent Labs   Lab  08/23/18   0420   INR  0.9   APTT  29.0     CE  Recent Labs   Lab  08/23/18   0420  08/23/18   1930  08/25/18   0624   TROPONINI  0.267*  0.233*  0.098*     BNP  Recent Labs   Lab  08/23/18   0217  08/26/18   1550   BNP  25  45     LAST HbA1c  Lab Results   Component Value Date    HGBA1C 8.1 (H) 08/23/2018     Imaging- no new imaging      Assessment/Plan:   63 y.o. 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  Patient went to OR Sunday AM for appendectomy. Patient on Vanc and zosyn day 4 & Flagyl day 3. Patient remains intubated after surgery, was successfully extubated 2 days prior.       Appendiceal Abscess  - Blood cultures x 2 - Negative   - POD#3 for open appendectomy   - Abx- zosyn and flagyl   - Surgical site dressing changed yesterday by surgery  - Appendix culture- positive for E coli. Await sensitivities     NIKHIL  - BUN/Crea of 26/2.5 on admit  - improved to 16/1.3 on 8/25  - BUN/Crea of 15/1.4 this AM   - continue to monitor     CVA    CT head is negative for acute hemorrhagic event  Anti-platelet therapy: ASA 81/Plavix 75mg   Atorvastatin 40mg PO daily  Q4 nuero checks  PT/OT/ST  Fall precautions  Drug screen pos for opiates      Carotid Doppler ordered- Allowing for limitation, 50-69% right ICA stenosis with less than 50% left ICA stenosis.  Antegrade vertebral arteries bilaterally.     2D  echo with bubble study- 1 - Concentric remodeling.     2 - No wall motion abnormalities.     3 - Normal left ventricular systolic function (EF 60-65%).     4 - Impaired LV relaxation, normal LAP (grade 1 diastolic dysfunction).     5 - Normal right ventricular systolic function .     6 - The estimated PA systolic pressure is 14 mmHg.     7 - No evidence of intracardiac shunt.      EEG performed- encephalopathy      CTA head and neck Unremarkable and without evidence for proximal significant stenosis or occlusion.     HTN  Clonidine .1mg PO BID  Lasix 40mg PO BID  Diltiazem 180mg ER PO daily      DM2  -Patient on moderate dose SSI     CAD  - Continue ASA and plavix and Atorvastatin      Elevated troponin- resolving   - 0.273-->0.267--> 0.233--> .098         PT/OT: ordered and following  Code: full  Diet: full liquid, advance as tolerated   Ppx: dvt: lovenox   Dispo: f/u further surgery recs      Case discussed with staff        Leta Jovel MD  [facility]  08/29/2018

## 2018-08-29 NOTE — PLAN OF CARE
Problem: Occupational Therapy Goal  Goal: Occupational Therapy Goal  Goals to be met by: 8/31/2018    Patient will increase functional independence with ADLs by performing:    Feeding with Modified [place].  UE Dressing with Set-up Assistance.  LE Dressing with Minimal Assistance.  Grooming while seated with Set-up Assistance.  Toileting from bedside commode with Minimal Assistance for hygiene and clothing management.   Sitting at edge of bed x20 minutes with Supervision.  Rolling to Bilateral with Modified [place].   Supine to sit with Modified [place].  Stand pivot transfers with Stand-by Assistance.  Step transfer with Supervision and Stand-by Assistance  Toilet transfer to bedside commode with Stand-by Assistance.  Increased functional strength to WFL for BUE.  Upper extremity exercise program 10 reps per handout, with supervision.     Outcome: Ongoing (interventions implemented as appropriate)  Patient with elevated BP this AM - headache, N/V. MD present to see patient. Patient unable to tolerate much activity this date. Will benefit from continued skilled OT to address functional deficits.

## 2018-08-29 NOTE — PROGRESS NOTES
Progress Note  Surgery   Admit Date: 8/22/2018   LOS: 6 days   SUBJECTIVE:   Follow-up For: appendectomy following appendiceal abscess     Patient seen and examined this AM at bedside. The pt states she is feeling well, denies any increased incisional pain, warmth, erythema or discharge. Denies any fever or chills. She does endorse have multiple episodes of diarrhea.     Review of Systems   Constitutional: Negative for chills and fever.   Cardiovascular: Negative.    Gastrointestinal: Positive for diarrhea.   Skin: Negative.        OBJECTIVE:   Vital Signs (Most Recent)  Temp: 98.2 °F (36.8 °C) (08/29/18 0814)  Pulse: 89 (08/29/18 0814)  Resp: 18 (08/29/18 0814)  BP: (!) 164/75 (08/29/18 0814)  SpO2: 99 % (08/29/18 0721)    I & O (Last 24H):    Intake/Output Summary (Last 24 hours) at 8/29/2018 0912  Last data filed at 8/29/2018 0600  Gross per 24 hour   Intake 830 ml   Output 600 ml   Net 230 ml     Wt Readings from Last 3 Encounters:   08/29/18 88 kg (194 lb 0.1 oz)   08/14/18 81.6 kg (180 lb)       Current Diet Order   Procedures    Diet [place]     soft        Physical Exam   Constitutional: She is oriented to person, place, and time and well-developed, well-nourished, and in no distress.   HENT:   Head: Normocephalic and atraumatic.   Eyes: EOM are normal.   Neck: Normal range of motion. Neck supple.   Cardiovascular: Normal rate, regular rhythm, normal heart sounds and intact distal pulses.   Pulmonary/Chest: Effort normal and breath sounds normal.   Abdominal: Soft. Bowel sounds are normal.   Well healing surgical incision, staples in place, no erythema, warmth or discharge    Musculoskeletal: Normal range of motion.   Neurological: She is alert and oriented to person, place, and time.   Nursing note and vitals reviewed.        Laboratory Data:  CBC  Recent Labs   Lab  08/27/18   1420  08/28/18   0421  08/29/18   0451   WBC  29.02*  25.00*  23.90*   RBC  3.56*  3.31*  3.23*   HGB  8.6*  8.0*  7.9*   HCT  28.8*   26.4*  26.1*   PLT  288  281  300   MCV  81*  80*  81*   MCH  24.2*  24.2*  24.5*   MCHC  29.9*  30.3*  30.3*     CMP  Recent Labs   Lab  08/27/18   1420  08/28/18   0421  08/29/18   0451   CALCIUM  8.4*  8.2*  8.4*   PROT  5.8*  5.7*  6.1   NA  134*  135*  141   K  4.2  3.9  3.7   CO2  29  27  31*   CL  98  99  102   BUN  11  12  15   CREATININE  1.9*  1.4  1.1   ALKPHOS  114  106  122   ALT  17  22  23   AST  38  41*  33   BILITOT  2.0*  0.7  0.3     POCT-Glucose  POCT Glucose   Date Value Ref Range Status   08/29/2018 162 (H) 70 - 110 mg/dL Final   08/28/2018 149 (H) 70 - 110 mg/dL Final   08/28/2018 171 (H) 70 - 110 mg/dL Final   08/28/2018 168 (H) 70 - 110 mg/dL Final   08/28/2018 229 (H) 70 - 110 mg/dL Final   08/27/2018 188 (H) 70 - 110 mg/dL Final   08/27/2018 173 (H) 70 - 110 mg/dL Final   08/27/2018 136 (H) 70 - 110 mg/dL Final   08/27/2018 143 (H) 70 - 110 mg/dL Final   08/26/2018 102 70 - 110 mg/dL Final     COAGS  Recent Labs   Lab  08/23/18   0420   INR  0.9   APTT  29.0     MICRO  Microbiology Results (last 7 days)     Procedure Component Value Units Date/Time    Blood culture [[id number]] Collected:  08/23/18 2041    Order Status:  Completed Specimen:  Blood Updated:  08/29/18 0612     Blood Culture, Routine No growth after 5 days.    Blood culture [[id number]] Collected:  08/23/18 2041    Order Status:  Completed Specimen:  Blood Updated:  08/29/18 0612     Blood Culture, Routine No growth after 5 days.    C Diff Toxin by PCR [[id number]]  (Abnormal) Collected:  08/28/18 1834    Order Status:  Completed Updated:  08/29/18 0348     C. diff PCR Positive    Clostridium difficile EIA [[id number]]  (Abnormal) Collected:  08/28/18 3211    Order Status:  Completed Specimen:  Stool Updated:  08/28/18 2128     C. diff Antigen Positive     C difficile Toxins A+B, EIA Negative     Comment: Testing not recommended for children <24 months old.       Aerobic culture [[id number]]  (Susceptibility) Collected:  08/26/18  1015    Order Status:  Completed Specimen:  Wound from Abdomen Updated:  08/28/18 1443     Aerobic Bacterial Culture --     ESCHERICHIA COLI  Moderate       Aerobic Bacterial Culture --     ENTEROCOCCUS RAFFINOSUS  Moderate      Narrative:       APPENDIX    Blood culture [[id number]] Collected:  08/27/18 0809    Order Status:  Completed Specimen:  Blood Updated:  08/28/18 1212     Blood Culture, Routine No Growth to date     Blood Culture, Routine No Growth to date    Blood culture [[id number]] Collected:  08/27/18 0809    Order Status:  Completed Specimen:  Blood Updated:  08/28/18 1212     Blood Culture, Routine No Growth to date     Blood Culture, Routine No Growth to date    Culture, Respiratory with Gram Stain [[id number]] Collected:  08/27/18 0752    Order Status:  Completed Specimen:  Respiratory from Endotracheal Aspirate Updated:  08/28/18 0951     Respiratory Culture No Growth     Gram Stain (Respiratory) <10 epithelial cells per low power field.     Gram Stain (Respiratory) Few WBC's     Gram Stain (Respiratory) No organisms seen    Blood culture [[id number]]     Order Status:  Canceled Specimen:  Blood     Blood culture [[id number]]     Order Status:  Canceled Specimen:  Blood     Culture, Anaerobe [[id number]] Collected:  08/26/18 1015    Order Status:  Completed Specimen:  Wound from Abdomen Updated:  08/27/18 0706     Anaerobic Culture Culture in progress    Narrative:       APPENDIX    Gram stain [[id number]] Collected:  08/26/18 1015    Order Status:  Completed Specimen:  Wound from Abdomen Updated:  08/26/18 1356     Gram Stain Result Moderate WBC's      Many Gram positive cocci      Many Gram positive rods    Narrative:       APPENDIX    Urine culture [[id number]] Collected:  08/25/18 0628    Order Status:  Completed Specimen:  Urine, Clean Catch Updated:  08/26/18 1144     Urine Culture, Routine No growth        LIPID PANEL  Lab Results   Component Value Date    CHOL 159 08/23/2018     Lab Results    Component Value Date    HDL 35 (L) 08/23/2018     Lab Results   Component Value Date    LDLCALC 86.6 08/23/2018     Lab Results   Component Value Date    TRIG 187 (H) 08/23/2018     Lab Results   Component Value Date    CHOLHDL 22.0 08/23/2018         ASSESSMENT/PLAN:   Sheryl Martines is a 63 y.o. female who had an appendectomy following appendiceal abscess. The incision is well healing with staples in place. Continue daily dressing changes with xeroform, 4 x 4 and tape       Case discussed and reviewed by Dr. Melendrez   8/29/2018 D'Amico C Johnson, MD  9:12 AM

## 2018-08-29 NOTE — PT/OT/SLP PROGRESS
Physical Therapy Treatment    Patient Name:  Sheryl Martines   MRN:  [id number]    Recommendations:     Discharge Recommendations:  nursing facility, skilled   Discharge Equipment Recommendations: (possible RW)   Barriers to discharge: Inaccessible home and Decreased caregiver support    Assessment:     Sheryl Martines is a 63 y.o. female admitted with a medical diagnosis of Appendicitis with peritonitis.  She presents with the following impairments/functional limitations:  weakness, impaired endurance, impaired self care skills, impaired functional mobilty, gait instability, impaired balance, decreased upper extremity function, decreased lower extremity function, pain, impaired skin, edema Pt unable to tolerate much activity this AM 2/2 elevated BP, c/o slight headache, nausea and vomiting up clear fluid, not feeling well; nurse informed and present; seen by MD and pt will be getting BP and nausea meds; will cont with POC..    Rehab Prognosis:  Fair+; patient would benefit from acute skilled PT services to address these deficits and reach maximum level of function.      Recent Surgery: Procedure(s) (LRB):  APPENDECTOMY, LAPAROSCOPIC---CONVERTED TO OPEN APPENDECTOMY @0950 (N/A)  APPENDECTOMY (N/A) 3 Days Post-Op    Plan:     During this hospitalization, patient to be seen 6 x/week to address the above listed problems via gait training, therapeutic exercises, therapeutic activities  · Plan of Care Expires:  09/27/18   Plan of Care Reviewed with: patient    Subjective     Communicated with nurse prior to session.  Patient found supine with HOB elevated upon PT entry to room, agreeable to treatment.      Pain/Comfort:  · Pain Rating 1: (did not rate but c/o slight headache, dizziness, N/V)  Pt wheezing  Patients cultural, spiritual, Sikh conflicts given the current situation: none    Objective:     Patient found with: bed alarm, central line, oxygen     General Precautions: Standard, fall   Orthopedic  Precautions:N/A   Braces: N/A     Functional Mobility:  · Bed Mobility:     · Scooting: stand by assistance and use of side rail  · Supine to Sit: stand by assistance and use of side rail with HOB elevated  · Sit to Supine: contact guard assistance      AM-PAC 6 CLICK MOBILITY  Turning over in bed (including adjusting bedclothes, sheets and blankets)?: 3  Sitting down on and standing up from a chair with arms (e.g., wheelchair, bedside commode, etc.): 1  Moving from lying on back to sitting on the side of the bed?: 3  Moving to and from a bed to a chair (including a wheelchair)?: 1  Need to walk in hospital room?: 1  Climbing 3-5 steps with a railing?: 1  Basic Mobility Total Score: 10       Therapeutic Activities and Exercises:   Patient reported not feeling well, audibly wheezing, but pt wanted to participate; performed activities as described above; pt sitting at EOB with SBA; pt c/o slight headache, N/V; PCT present to take vitals at EOB in sittin/100, HR 98; BP taken again at 200/9; nurse notified and spoke with MD for BP meds and anti-nausea meds; returned pt to supine with HOB elevated    Patient left HOB elevated with all lines intact, call button in reach, bed alarm on and nurse notified..    GOALS:   Multidisciplinary Problems     Physical Therapy Goals        Problem: Physical Therapy Goal    Goal Priority Disciplines Outcome Goal Variances Interventions   Physical Therapy Goal     PT, PT/OT Ongoing (interventions implemented as appropriate)     Description:  Goals to be met by: 2018     Patient will increase functional independence with mobility by performing:    Updated 2018: David completed this date  1. Supine to sit with Modified [place]  2. Sit to supine with Modified [place]  3. Rolling to Left and Right with Modified [place].  4. Sit to stand transfer with Stand-by Assistance with AD  5. Bed to chair transfer with Stand-by Assistance with AD  6. Gait  x  feet  with Stand-by Assistance using AD.   7. Ascend/Descend 8 inch curb step with Contact Guard Assistance and Minimal Assistance using AD  8. Lower extremity exercise program x10 reps with supervision              Problem: Physical Therapy Goal    Goal Priority Disciplines Outcome Goal Variances Interventions   Physical Therapy Goal     PT, PT/OT                      Time Tracking:     PT Received On: 08/29/18  PT Start Time: 1130     PT Stop Time: 1200  PT Total Time (min): 30 min with OT    Billable Minutes: Therapeutic Activity 15 minutes    Treatment Type: Treatment  PT/PTA: PT     PTA Visit Number: 0     Rosa Richards, PT  08/29/2018

## 2018-08-29 NOTE — PROGRESS NOTES
"Vancomycin Dosing and Monitoring Pharmacy Protocol    Sheryl Martines is a 63 y.o. female    Height: 5' 1" (1.549 m)   Wt Readings from Last 3 Encounters:   08/29/18 88 kg (194 lb 0.1 oz)   08/14/18 81.6 kg (180 lb)     Ideal body weight: 47.8 kg (105 lb 6.1 oz)  Adjusted ideal body weight: 63.9 kg (140 lb 13.3 oz)    Temp Readings from Last 3 Encounters:   08/29/18 98.1 °F (36.7 °C) (Oral)   08/14/18 97.1 °F (36.2 °C) (Oral)      Lab Results   Component Value Date/Time    WBC 23.90 (H) 08/29/2018 04:51 AM    WBC 25.00 (H) 08/28/2018 04:21 AM    WBC 29.02 (H) 08/27/2018 02:20 PM      Lab Results   Component Value Date/Time    CREATININE 1.1 08/29/2018 04:51 AM    CREATININE 1.4 08/28/2018 04:21 AM    CREATININE 1.9 (H) 08/27/2018 02:20 PM      Lab Results   Component Value Date/Time    LACTATE 1.1 08/27/2018 07:45 AM    LACTATE 1.9 08/24/2018 01:02 PM    LACTATE 1.1 08/23/2018 08:51 PM       Serum creatinine: 1.1 mg/dL 08/29/18 0451  Estimated creatinine clearance: 52.8 mL/min    Antibiotics (From admission, onward)      Start     Stop Route Frequency Ordered    08/29/18 1400  vancomycin in dextrose 5 % 1 gram/250 mL IVPB 1,000 mg  (Vancomycin IVPB with levels panel)      -- IV Every 12 hours (non-standard times) 08/29/18 1228    08/27/18 1430  metronidazole IVPB 500 mg      -- IV Every 8 hours (non-standard times) 08/27/18 0747    08/24/18 1445  tobramycin-dexamethasone 0.3-0.1% ophthalmic suspension 1 drop      -- Both Eyes Every 4 hours while awake 08/24/18 1335    08/24/18 1000  piperacillin-tazobactam 4.5 g in dextrose 5 % 100 mL IVPB (ready to mix system)      -- IV Every 8 hours (non-standard times) 08/24/18 0858          Antifungals (From admission, onward)      None            Microbiology Results (last 7 days)       Procedure Component Value Units Date/Time    Blood culture [[id number]] Collected:  08/27/18 0809    Order Status:  Completed Specimen:  Blood Updated:  08/29/18 1212     Blood Culture, Routine " No Growth to date     Blood Culture, Routine No Growth to date     Blood Culture, Routine No Growth to date    Blood culture [[id number]] Collected:  08/27/18 0809    Order Status:  Completed Specimen:  Blood Updated:  08/29/18 1212     Blood Culture, Routine No Growth to date     Blood Culture, Routine No Growth to date     Blood Culture, Routine No Growth to date    Culture, Respiratory with Gram Stain [[id number]] Collected:  08/27/18 0752    Order Status:  Completed Specimen:  Respiratory from Endotracheal Aspirate Updated:  08/29/18 1138     Respiratory Culture Normal respiratory david     Gram Stain (Respiratory) <10 epithelial cells per low power field.     Gram Stain (Respiratory) Few WBC's     Gram Stain (Respiratory) No organisms seen    Culture, Anaerobe [[id number]] Collected:  08/26/18 1015    Order Status:  Completed Specimen:  Wound from Abdomen Updated:  08/29/18 0940     Anaerobic Culture Culture in progress    Narrative:       APPENDIX    Blood culture [[id number]] Collected:  08/23/18 2041    Order Status:  Completed Specimen:  Blood Updated:  08/29/18 0612     Blood Culture, Routine No growth after 5 days.    Blood culture [[id number]] Collected:  08/23/18 2041    Order Status:  Completed Specimen:  Blood Updated:  08/29/18 0612     Blood Culture, Routine No growth after 5 days.    C Diff Toxin by PCR [[id number]]  (Abnormal) Collected:  08/28/18 1834    Order Status:  Completed Updated:  08/29/18 0348     C. diff PCR Positive    Clostridium difficile EIA [[id number]]  (Abnormal) Collected:  08/28/18 1834    Order Status:  Completed Specimen:  Stool Updated:  08/28/18 2128     C. diff Antigen Positive     C difficile Toxins A+B, EIA Negative     Comment: Testing not recommended for children <24 months old.       Aerobic culture [[id number]]  (Susceptibility) Collected:  08/26/18 1015    Order Status:  Completed Specimen:  Wound from Abdomen Updated:  08/28/18 1443     Aerobic Bacterial Culture --      ESCHERICHIA COLI  Moderate       Aerobic Bacterial Culture --     ENTEROCOCCUS RAFFINOSUS  Moderate      Narrative:       APPENDIX    Blood culture [[id number]]     Order Status:  Canceled Specimen:  Blood     Blood culture [[id number]]     Order Status:  Canceled Specimen:  Blood     Gram stain [[id number]] Collected:  18 1015    Order Status:  Completed Specimen:  Wound from Abdomen Updated:  18 1356     Gram Stain Result Moderate WBC's      Many Gram positive cocci      Many Gram positive rods    Narrative:       APPENDIX    Urine culture [[id number]] Collected:  18 0628    Order Status:  Completed Specimen:  Urine, Clean Catch Updated:  18 1144     Urine Culture, Routine No growth            Indication/Target trough:   Intra- abdominal infection     Hemodialysis:   N/A    Dosing Weight:   AdjBW--adjusted body weight  If ABW is greater than or equal to 30% over Ideal Body Weight, AdjBW will be used to calculate vancomycin dose.    Last Vancomycin dose: 1000 mg  Date/Time given: 2018 at 1400          Vancomycin level:  Recent Labs   Lab Result Units  18   1550   Vancomycin-Trough ug/mL  7.4*     Recent Labs   Lab Result Units  18   1550  18   1700   Vancomycin, Random ug/mL   --   12.2   Vancomycin-Trough ug/mL  7.4*   --        Per Protocol Initial/Adjustments Dosin. Initial/Adjustment Dose: DECREASE Vancomycin will be adjusted from 1500 mg q12hr to 1000mg q24hr  2. Vancomycin Trough Level will be drawn on 2018 at 1300 date/time    Pharmacy will continue to follow.    Please contact if you have any further questions. Thank you.    Suzanne Martino, PharmD  [phone]

## 2018-08-30 PROBLEM — A49.8 CLOSTRIDIUM DIFFICILE INFECTION: Status: ACTIVE | Noted: 2018-08-30

## 2018-08-30 LAB
ALBUMIN SERPL BCP-MCNC: 2.1 G/DL
ALP SERPL-CCNC: 115 U/L
ALT SERPL W/O P-5'-P-CCNC: 18 U/L
ANION GAP SERPL CALC-SCNC: 7 MMOL/L
ANISOCYTOSIS BLD QL SMEAR: SLIGHT
AST SERPL-CCNC: 22 U/L
BASOPHILS NFR BLD: 0 %
BILIRUB SERPL-MCNC: 0.3 MG/DL
BUN SERPL-MCNC: 15 MG/DL
CALCIUM SERPL-MCNC: 8.7 MG/DL
CHLORIDE SERPL-SCNC: 100 MMOL/L
CO2 SERPL-SCNC: 33 MMOL/L
CREAT SERPL-MCNC: 1 MG/DL
DIFFERENTIAL METHOD: ABNORMAL
EOSINOPHIL NFR BLD: 0 %
ERYTHROCYTE [DISTWIDTH] IN BLOOD BY AUTOMATED COUNT: 17.2 %
EST. GFR  (AFRICAN AMERICAN): >60 ML/MIN/1.73 M^2
EST. GFR  (NON AFRICAN AMERICAN): >60 ML/MIN/1.73 M^2
GLUCOSE SERPL-MCNC: 149 MG/DL
HCT VFR BLD AUTO: 26.3 %
HGB BLD-MCNC: 7.5 G/DL
HYPOCHROMIA BLD QL SMEAR: ABNORMAL
LYMPHOCYTES NFR BLD: 10 %
MAGNESIUM SERPL-MCNC: 1.7 MG/DL
MCH RBC QN AUTO: 23.9 PG
MCHC RBC AUTO-ENTMCNC: 28.5 G/DL
MCV RBC AUTO: 84 FL
MONOCYTES NFR BLD: 4 %
NEUTROPHILS NFR BLD: 85 %
NEUTS BAND NFR BLD MANUAL: 1 %
OVALOCYTES BLD QL SMEAR: ABNORMAL
PHOSPHATE SERPL-MCNC: 2.4 MG/DL
PLATELET # BLD AUTO: 384 K/UL
PLATELET BLD QL SMEAR: ABNORMAL
PMV BLD AUTO: 9.7 FL
POCT GLUCOSE: 176 MG/DL (ref 70–110)
POCT GLUCOSE: 238 MG/DL (ref 70–110)
POCT GLUCOSE: 241 MG/DL (ref 70–110)
POCT GLUCOSE: 275 MG/DL (ref 70–110)
POCT GLUCOSE: 285 MG/DL (ref 70–110)
POIKILOCYTOSIS BLD QL SMEAR: SLIGHT
POLYCHROMASIA BLD QL SMEAR: ABNORMAL
POTASSIUM SERPL-SCNC: 3.8 MMOL/L
PROT SERPL-MCNC: 6.1 G/DL
RBC # BLD AUTO: 3.14 M/UL
SODIUM SERPL-SCNC: 140 MMOL/L
TARGETS BLD QL SMEAR: ABNORMAL
WBC # BLD AUTO: 16.09 K/UL

## 2018-08-30 PROCEDURE — 25000003 PHARM REV CODE 250: Performed by: STUDENT IN AN ORGANIZED HEALTH CARE EDUCATION/TRAINING PROGRAM

## 2018-08-30 PROCEDURE — 27000221 HC OXYGEN, UP TO 24 HOURS

## 2018-08-30 PROCEDURE — 97535 SELF CARE MNGMENT TRAINING: CPT

## 2018-08-30 PROCEDURE — 63600175 PHARM REV CODE 636 W HCPCS: Performed by: STUDENT IN AN ORGANIZED HEALTH CARE EDUCATION/TRAINING PROGRAM

## 2018-08-30 PROCEDURE — S0030 INJECTION, METRONIDAZOLE: HCPCS | Performed by: HOSPITALIST

## 2018-08-30 PROCEDURE — 25500020 PHARM REV CODE 255: Performed by: FAMILY MEDICINE

## 2018-08-30 PROCEDURE — 94664 DEMO&/EVAL PT USE INHALER: CPT

## 2018-08-30 PROCEDURE — 11000001 HC ACUTE MED/SURG PRIVATE ROOM

## 2018-08-30 PROCEDURE — 97110 THERAPEUTIC EXERCISES: CPT

## 2018-08-30 PROCEDURE — 94799 UNLISTED PULMONARY SVC/PX: CPT

## 2018-08-30 PROCEDURE — 97530 THERAPEUTIC ACTIVITIES: CPT

## 2018-08-30 PROCEDURE — 94761 N-INVAS EAR/PLS OXIMETRY MLT: CPT

## 2018-08-30 PROCEDURE — 25000242 PHARM REV CODE 250 ALT 637 W/ HCPCS: Performed by: STUDENT IN AN ORGANIZED HEALTH CARE EDUCATION/TRAINING PROGRAM

## 2018-08-30 PROCEDURE — A4216 STERILE WATER/SALINE, 10 ML: HCPCS | Performed by: STUDENT IN AN ORGANIZED HEALTH CARE EDUCATION/TRAINING PROGRAM

## 2018-08-30 PROCEDURE — 63600175 PHARM REV CODE 636 W HCPCS: Performed by: FAMILY MEDICINE

## 2018-08-30 PROCEDURE — 85027 COMPLETE CBC AUTOMATED: CPT

## 2018-08-30 PROCEDURE — 25000003 PHARM REV CODE 250: Performed by: FAMILY MEDICINE

## 2018-08-30 PROCEDURE — 80053 COMPREHEN METABOLIC PANEL: CPT

## 2018-08-30 PROCEDURE — 94640 AIRWAY INHALATION TREATMENT: CPT

## 2018-08-30 PROCEDURE — 84100 ASSAY OF PHOSPHORUS: CPT

## 2018-08-30 PROCEDURE — 27100171 HC OXYGEN HIGH FLOW UP TO 24 HOURS

## 2018-08-30 PROCEDURE — 25000003 PHARM REV CODE 250: Performed by: OPHTHALMOLOGY

## 2018-08-30 PROCEDURE — 83735 ASSAY OF MAGNESIUM: CPT

## 2018-08-30 PROCEDURE — 27000646 HC AEROBIKA DEVICE

## 2018-08-30 PROCEDURE — 25000003 PHARM REV CODE 250: Performed by: INTERNAL MEDICINE

## 2018-08-30 PROCEDURE — 99900035 HC TECH TIME PER 15 MIN (STAT)

## 2018-08-30 PROCEDURE — 85007 BL SMEAR W/DIFF WBC COUNT: CPT

## 2018-08-30 PROCEDURE — 25000003 PHARM REV CODE 250: Performed by: HOSPITALIST

## 2018-08-30 RX ORDER — THEOPHYLLINE 100 MG/1
200 TABLET, EXTENDED RELEASE ORAL DAILY
Status: DISCONTINUED | OUTPATIENT
Start: 2018-08-30 | End: 2018-08-30

## 2018-08-30 RX ORDER — LOSARTAN POTASSIUM 50 MG/1
100 TABLET ORAL DAILY
Status: DISCONTINUED | OUTPATIENT
Start: 2018-08-30 | End: 2018-09-05 | Stop reason: HOSPADM

## 2018-08-30 RX ORDER — METRONIDAZOLE 500 MG/1
500 TABLET ORAL EVERY 8 HOURS
Status: DISCONTINUED | OUTPATIENT
Start: 2018-08-30 | End: 2018-08-30

## 2018-08-30 RX ORDER — METRONIDAZOLE 500 MG/1
500 TABLET ORAL EVERY 8 HOURS
Status: DISCONTINUED | OUTPATIENT
Start: 2018-08-30 | End: 2018-09-01

## 2018-08-30 RX ADMIN — Medication 3 ML: at 10:08

## 2018-08-30 RX ADMIN — FAMOTIDINE 20 MG: 20 TABLET, FILM COATED ORAL at 09:08

## 2018-08-30 RX ADMIN — INSULIN ASPART 2 UNITS: 100 INJECTION, SOLUTION INTRAVENOUS; SUBCUTANEOUS at 06:08

## 2018-08-30 RX ADMIN — LABETALOL HYDROCHLORIDE 10 MG: 5 INJECTION, SOLUTION INTRAVENOUS at 11:08

## 2018-08-30 RX ADMIN — THEOPHYLLINE ANHYDROUS 200 MG: 100 CAPSULE, EXTENDED RELEASE ORAL at 01:08

## 2018-08-30 RX ADMIN — IPRATROPIUM BROMIDE AND ALBUTEROL SULFATE 3 ML: .5; 3 SOLUTION RESPIRATORY (INHALATION) at 12:08

## 2018-08-30 RX ADMIN — IPRATROPIUM BROMIDE AND ALBUTEROL SULFATE 3 ML: .5; 3 SOLUTION RESPIRATORY (INHALATION) at 04:08

## 2018-08-30 RX ADMIN — LOSARTAN POTASSIUM 100 MG: 50 TABLET, FILM COATED ORAL at 11:08

## 2018-08-30 RX ADMIN — METRONIDAZOLE 500 MG: 500 INJECTION, SOLUTION INTRAVENOUS at 09:08

## 2018-08-30 RX ADMIN — CLOPIDOGREL BISULFATE 75 MG: 75 TABLET ORAL at 09:08

## 2018-08-30 RX ADMIN — IPRATROPIUM BROMIDE AND ALBUTEROL SULFATE 3 ML: .5; 3 SOLUTION RESPIRATORY (INHALATION) at 07:08

## 2018-08-30 RX ADMIN — DICLOFENAC: 10 GEL TOPICAL at 09:08

## 2018-08-30 RX ADMIN — IOHEXOL 100 ML: 350 INJECTION, SOLUTION INTRAVENOUS at 08:08

## 2018-08-30 RX ADMIN — TOBRAMYCIN AND DEXAMETHASONE 1 DROP: 3; 1 SUSPENSION/ DROPS OPHTHALMIC at 06:08

## 2018-08-30 RX ADMIN — VANCOMYCIN HYDROCHLORIDE 1000 MG: 1 INJECTION, POWDER, LYOPHILIZED, FOR SOLUTION INTRAVENOUS at 03:08

## 2018-08-30 RX ADMIN — INSULIN ASPART 6 UNITS: 100 INJECTION, SOLUTION INTRAVENOUS; SUBCUTANEOUS at 06:08

## 2018-08-30 RX ADMIN — TOBRAMYCIN AND DEXAMETHASONE 1 DROP: 3; 1 SUSPENSION/ DROPS OPHTHALMIC at 10:08

## 2018-08-30 RX ADMIN — TOBRAMYCIN AND DEXAMETHASONE 1 DROP: 3; 1 SUSPENSION/ DROPS OPHTHALMIC at 01:08

## 2018-08-30 RX ADMIN — ENOXAPARIN SODIUM 30 MG: 100 INJECTION SUBCUTANEOUS at 06:08

## 2018-08-30 RX ADMIN — Medication 3 ML: at 12:08

## 2018-08-30 RX ADMIN — Medication 500 MG: at 06:08

## 2018-08-30 RX ADMIN — METRONIDAZOLE 500 MG: 500 TABLET ORAL at 10:08

## 2018-08-30 RX ADMIN — HYPROMELLOSE 2910 1 DROP: 5 SOLUTION OPHTHALMIC at 06:08

## 2018-08-30 RX ADMIN — CIPROFLOXACIN 400 MG: 2 INJECTION, SOLUTION INTRAVENOUS at 04:08

## 2018-08-30 RX ADMIN — PREDNISONE 20 MG: 20 TABLET ORAL at 10:08

## 2018-08-30 RX ADMIN — Medication 3 ML: at 02:08

## 2018-08-30 RX ADMIN — DILTIAZEM HYDROCHLORIDE 180 MG: 180 CAPSULE, EXTENDED RELEASE ORAL at 09:08

## 2018-08-30 RX ADMIN — LIDOCAINE 1 PATCH: 50 PATCH CUTANEOUS at 12:08

## 2018-08-30 RX ADMIN — FAMOTIDINE 20 MG: 20 TABLET, FILM COATED ORAL at 10:08

## 2018-08-30 RX ADMIN — TOBRAMYCIN AND DEXAMETHASONE 1 DROP: 3; 1 SUSPENSION/ DROPS OPHTHALMIC at 02:08

## 2018-08-30 RX ADMIN — FUROSEMIDE 40 MG: 40 TABLET ORAL at 10:08

## 2018-08-30 RX ADMIN — ATORVASTATIN CALCIUM 40 MG: 40 TABLET, FILM COATED ORAL at 09:08

## 2018-08-30 RX ADMIN — INSULIN ASPART 2 UNITS: 100 INJECTION, SOLUTION INTRAVENOUS; SUBCUTANEOUS at 10:08

## 2018-08-30 RX ADMIN — ASPIRIN 81 MG: 81 TABLET, COATED ORAL at 09:08

## 2018-08-30 RX ADMIN — FUROSEMIDE 40 MG: 40 TABLET ORAL at 09:08

## 2018-08-30 RX ADMIN — TRAMADOL HYDROCHLORIDE 50 MG: 50 TABLET, COATED ORAL at 03:08

## 2018-08-30 RX ADMIN — TOBRAMYCIN AND DEXAMETHASONE 1 DROP: 3; 1 SUSPENSION/ DROPS OPHTHALMIC at 11:08

## 2018-08-30 RX ADMIN — INSULIN ASPART 6 UNITS: 100 INJECTION, SOLUTION INTRAVENOUS; SUBCUTANEOUS at 01:08

## 2018-08-30 RX ADMIN — METRONIDAZOLE 500 MG: 500 INJECTION, SOLUTION INTRAVENOUS at 12:08

## 2018-08-30 RX ADMIN — PREDNISONE 20 MG: 20 TABLET ORAL at 09:08

## 2018-08-30 NOTE — PLAN OF CARE
Problem: Occupational Therapy Goal  Goal: Occupational Therapy Goal  Goals to be met by: 8/31/2018    Patient will increase functional independence with ADLs by performing:    Feeding with Modified [place].  UE Dressing with Set-up Assistance.  LE Dressing with Minimal Assistance.  Grooming while seated with Set-up Assistance.  Toileting from bedside commode with Minimal Assistance for hygiene and clothing management.   Sitting at edge of bed x20 minutes with Supervision.  Rolling to Bilateral with Modified [place].   Supine to sit with Modified [place].  Stand pivot transfers with Stand-by Assistance.  Step transfer with Supervision and Stand-by Assistance  Toilet transfer to bedside commode with Stand-by Assistance.  Increased functional strength to WFL for BUE.  Upper extremity exercise program 10 reps per handout, with supervision.     Outcome: Ongoing (interventions implemented as appropriate)  Pt agreeable to co-tx OT/PT this date. Pt c/o 5/10 lower abd pain at rest. Pt perf the following: sup-->EOB w/ SBA & ani'd sitting EOB x ~10 min w/ Sup. Total A to don socks. SPT via RW EOB-->b/s chair w/ CGA. G/H sitting up in b/s chair w/ SBA after S/U. Perf in supported sit & only sat up to unsupported while rinsing mouth, but grimmaced & c/o increased R lower abd pain w/ trunk flex. Pt declined to attempt G/H in standing stating that she sits on rollator to perf at home. Perf UB TE 1x10 reps ea all major planes w/ emph on deep/pursed lip breathing techs. [facility] re:HEP & deep/pursed lip breathing. Pt/dgtr verbalized understanding.    Pt w/ increased mobility & tolerance for tx this date. Cont w/ OT per POC.

## 2018-08-30 NOTE — PROGRESS NOTES
"Surgery follow up  BP (!) 167/74   Pulse 70   Temp 99.1 °F (37.3 °C)   Resp 16   Ht 5' 1" (1.549 m)   Wt 88 kg (194 lb 0.1 oz)   SpO2 100%   Breastfeeding? No   BMI 36.66 kg/m²   I/O last 3 completed shifts:  In: 2085 [P.O.:835; IV Piggyback:1250]  Out: 900 [Urine:900]  I/O this shift:  In: -   Out: 150 [Urine:150]  Recent Results (from the past 336 hour(s))   CBC auto differential    Collection Time: 08/30/18  4:45 AM   Result Value Ref Range    WBC 16.09 (H) 3.90 - 12.70 K/uL    Hemoglobin 7.5 (L) 12.0 - 16.0 g/dL    Hematocrit 26.3 (L) 37.0 - 48.5 %    Platelets 384 (H) 150 - 350 K/uL   CBC auto differential    Collection Time: 08/29/18  4:51 AM   Result Value Ref Range    WBC 23.90 (H) 3.90 - 12.70 K/uL    Hemoglobin 7.9 (L) 12.0 - 16.0 g/dL    Hematocrit 26.1 (L) 37.0 - 48.5 %    Platelets 300 150 - 350 K/uL   CBC auto differential    Collection Time: 08/28/18  4:21 AM   Result Value Ref Range    WBC 25.00 (H) 3.90 - 12.70 K/uL    Hemoglobin 8.0 (L) 12.0 - 16.0 g/dL    Hematocrit 26.4 (L) 37.0 - 48.5 %    Platelets 281 150 - 350 K/uL     Recent Results (from the past 336 hour(s))   Basic metabolic panel    Collection Time: 08/24/18 12:06 PM   Result Value Ref Range    Sodium 135 (L) 136 - 145 mmol/L    Potassium 3.5 3.5 - 5.1 mmol/L    Chloride 91 (L) 95 - 110 mmol/L    CO2 32 (H) 23 - 29 mmol/L    BUN, Bld 20 8 - 23 mg/dL    Creatinine 1.7 (H) 0.5 - 1.4 mg/dL    Calcium 9.6 8.7 - 10.5 mg/dL    Anion Gap 12 8 - 16 mmol/L   abdomen soft less tenderness,   Continue present treatment.  "

## 2018-08-30 NOTE — MEDICAL/APP STUDENT
[facility]  Infectious Disease  Consult Note     Patient Name: Sheryl Martines  MRN: [id number]  Admission Date: 8/22/2018  Hospital Length of Stay: 8 days  Attending Physician: TEJAS Wick MD  Primary Care Provider: No PCP     Isolation Status: Contact Precautions     Patient information was obtained from patient, relative(s), past medical records and ER records.       Inpatient consult to Infectious Diseases  Consult performed by: Virginia Gutierrez MD  Consult ordered by: TEJAS Wick MD  Reason for consult: C. Diff s/p appendectomy        Subjective:       Patient ID: Sheryl Martines is a 63 y.o. female.    Chief Complaint: Altered Mental Status (To ER with EMS stating that the family called for altered mental status but was awake and alert when they arrived.  pt with slurred speech and has slept alot today, taking pain medication for an injury one week ago.)    Pt is a 63 y.o. Female with pmhx of HTN, DM2, CAD, asthma, pontine stroke in 2012 with residual R sided weakness, osteoporosis. She presented to the ED on 8/22 with severe midline lower back pain s/p trip and fall the day before.  Pt was unable to get out of bed due to pain, and her daughter called EMS as pt was sluggish and they could not get her out of bed. While being evaluated for this back pain and reported AMS, pt received an MRI which identified peritonitis in the abdomen. Further imaging revealed the extent of the appendicitis. She was brought in for surgery on 8/26 to have the appendix removed; per operative note, pt was cleared for a laparoscopic appendectomy but this proved impossible 2/2 adhesions from past surgeries and she was instead given an open appendectomy. Op note further states the peritonitis was localized, appendix successfully removed, and region thoroughly irrigated with abx solution. Since surgery pt reports onset of intermittent nausea, decreased appetite, and diarrhea (12-16 episodes yesterday). Wound cultures  from the procedure have grown anaerobes bacteroides fragilis, bacteroides thetaiotaomicron, and eggerthella lenta; aerobes E. Coli (cipro sensitive) and enterococcus raffinosus (vanc/genta sensitive). Primary team has been treating with cipro, flagyl, and vancomycin. On 8/28 pt tested positive for clostridium difficile. Pt denies any previous episodes of c.diff and cannot remember the last time she required abx.  Pt denies any recent sick contacts. She is retired and lives with her daughter with no pets. She has never been hospitalized for an infection in the past, and reports most recent hospitalization being last year for asthma exacerbation. ID was consulted for abx regimen recs.       Review of Systems   Constitutional: Positive for appetite change (decreased). Negative for chills and fever.   HENT: Negative for congestion and rhinorrhea.    Respiratory: Positive for shortness of breath. Negative for cough.    Cardiovascular: Negative for chest pain.   Gastrointestinal: Positive for abdominal pain (moderate, at site of incision), diarrhea, nausea and vomiting.   Genitourinary: Negative for difficulty urinating, dysuria and flank pain.   Musculoskeletal: Negative for back pain, neck pain and neck stiffness.   Neurological: Positive for headaches.       Objective:      Physical Exam   Constitutional: She is oriented to person, place, and time. She appears well-developed and well-nourished. No distress.   HENT:   Head: Normocephalic and atraumatic.   Mouth/Throat: Oropharynx is clear and moist.   Eyes: Conjunctivae and EOM are normal.   Neck: Normal range of motion.   Cardiovascular: Normal rate, regular rhythm, normal heart sounds and intact distal pulses.   2+ pitting edema up to ankle of BLE   Pulmonary/Chest: Effort normal. No respiratory distress. She has wheezes (bilateral upper). She exhibits no tenderness.   Musculoskeletal: Normal range of motion.   Tenderness to BLE, worse in R   Neurological: She is  alert and oriented to person, place, and time.   Skin: Skin is warm and dry. She is not diaphoretic. No erythema.   Port located in R upper      Vitals: 99.1 70bpm 16RR 167/74 100%  WBC 16.09 H/H 7.5/26.3 Plt 384  NXX=726 (8/24)  Cr=1    CT Lumbar spine 8/25  Impression: Extensive multiple compression fractures of the lumbar spine.  No evidence of fracture extension into the spinal canal.  No definitive CT evidence of an epidural abscess, allowing for study limitations.  A gallium scan may be attempted if there is high clinical concern for discitis osteomyelitis.  Ill-defined 3.4 x 3.1 cm air-fluid collection in the right lower quadrant.  The findings are most suggestive of an abscess.  Dedicated CT scan of the abdomen and pelvis with intravenous contrast suggested for further evaluation.  Differential considerations would include abscess secondary to acute appendicitis or contained bowel perforation.  Surgical consultation is recommended.    CT abd 8/25   Impression:1. Findings most consistent with acute appendicitis with distal appendiceal perforation versus gas-forming infection.  There is a contained air collection at the appendiceal tip, with minimal internal fluid.  Early abscess formation is a consideration although remains somewhat disorganized at this time.  Correlation is advised.  2. Excretion of contrast by the kidneys from previous examination without definite nephrolithiasis.  3. Cholelithiasis.  4. Please see separately dictated report for details of the lumbar spine.      Assessment/Plan:     Pt is currently being treated with IV ciprofloxacin for E. Coli sensitivities and flagyl and vanc IV for anaerobes and Enterococcus. While this is adequate coverage for these cultures, with recently discovered c. Diff infection we have concern that this continued regiment may work against controlling the c. Diff. Per surgical report, it is believed the source of infection has been adequately removed and area  thoroughly irrigated with abx solution. Her blood cultures at this point have been negative, her WBC count has been trending down, and she has been afebrile for days. Thus, we may consider discontinuing the ciprofloxacin and IV vancomycin. However, we feel it important to continue to cover against bacteroides fragilis, which may cause abdominal abscesses. Pt may require a CT abdomen to make certain an abscess has not yet formed.  As pt's WBC count and creatinine are modest, and pt has no previous episodes of c diff reported, pt fits the recommendations for tx of nonsevere CDI. She should be started on 10 day course of PO vanc.     Recommendations:  -Start 10 day course PO vancomycin  -Continue metronidazole  -Discontinue IV ciprofloxacin and vancomycin  -CT abdomen to evaluate for abdominal abscesses secondary to bacteroides fragilis infection

## 2018-08-30 NOTE — PLAN OF CARE
Problem: Patient Care Overview  Goal: Plan of Care Review  Outcome: Ongoing (interventions implemented as appropriate)  Patient remains free from falls, bed alarm in use. Patient turned in bed using pillows, no pressure ulcers noted. Blood glucose at  and 176 in am SSI given as ordered. IV antibiotics given as ordered. Dressing changed to port-a-cath vis sterile technique. Patient medicated for pain with good pain relief noted. Oxygen high viry 7 liters NC sats  100%. C-Diff isolation maintained.

## 2018-08-30 NOTE — PROGRESS NOTES
Patient's daughter called asking about patient's, her mother's, condition and if she was checked for blood clots, chest x- ray was done. She really want's to speak to the doctor. I said I would let the doctor know she wanted to speak with her and confirmed her name and number. Citlaly Ritter [id number]. Will continue to monitor.

## 2018-08-30 NOTE — NURSING
Pt awake and alert. Complaints of nausea with dry heaving present with relief from IVPB phenergan. No complaints of pain. Regular diet with poor intake. Patient is currently on 7L high flow oxygen sats at %. Bedpan used for voiding. Special precautions maintained. Fluids and ABx running per orders. Safety maintained. Bed locked and in lowest position. Call light and personal items within reach.

## 2018-08-30 NOTE — PT/OT/SLP PROGRESS
Occupational Therapy   Treatment    Name: Sheryl Martines  MRN: [id number]  Admitting Diagnosis:  Appendicitis with peritonitis  4 Days Post-Op    Recommendations:     Discharge Recommendations: nursing facility, skilled  Discharge Equipment Recommendations:     Barriers to discharge:  Decreased caregiver support, Inaccessible home environment    Subjective     Communicated with: nsg prior to session.  Pain/Comfort:  · Pain Rating 1: 5/10  · Location - Side 1: Right  · Location - Orientation 1: lower  · Location 1: abdomen  · Pain Addressed 1: Reposition, Distraction, Cessation of Activity  · Pain Rating Post-Intervention 1: 8/10    Patients cultural, spiritual, Jainism conflicts given the current situation: (none reported)    Objective:     Patient found with:      General Precautions: Standard, fall   Orthopedic Precautions:N/A   Braces:       Occupational Performance:    Bed Mobility:    · Patient completed Supine to Sit with stand by assistance and with side rail     Functional Mobility/Transfers:  · Patient completed Sit <> Stand Transfer with contact guard assistance  with  rolling walker   · Patient completed Bed <> Chair Transfer using Stand Pivot technique with contact guard assistance with rolling walker  · Functional Mobility:     Activities of Daily Living:  · Grooming: stand by assistance at b/s chair level    Patient left up in chair with all lines intact, call button in reach, chair alarm on and dgtr & PT present    AMPA 6 Click:  AMPA Total Score: 14    Treatment & Education:    Education:  Pt agreeable to co-tx OT/PT this date. Pt c/o 5/10 lower abd pain at rest. Pt perf the following: sup-->EOB w/ SBA & ani'd sitting EOB x ~10 min w/ Sup. Total A to don socks. SPT via RW EOB-->b/s chair w/ CGA. G/H sitting up in b/s chair w/ SBA after S/U. Perf in supported sit & only sat up to unsupported while rinsing mouth, but grimmaced & c/o increased R lower abd pain w/ trunk flex. Pt declined to attempt  G/H in standing stating that she sits on rollator to perf at home. Perf UB TE 1x10 reps ea all major planes w/ emph on deep/pursed lip breathing techs. [facility] re:HEP & deep/pursed lip breathing. Pt/dgtr verbalized understanding.      Assessment:   Pt w/ increased mobility & tolerance for tx this date. Cont w/ OT per POC.    Sheryl Martines is a 63 y.o. female with a medical diagnosis of Appendicitis with peritonitis.  She presents with ..  Performance deficits affecting function are weakness, gait instability, decreased lower extremity function, impaired balance, impaired endurance, decreased upper extremity function, impaired self care skills, impaired functional mobilty, decreased safety awareness, pain, impaired skin, edema, impaired cardiopulmonary response to activity.      Rehab Prognosis:  good; patient would benefit from acute skilled OT services to address these deficits and reach maximum level of function.       Plan:     Patient to be seen 5 x/week to address the above listed problems via self-care/home management, therapeutic exercises  · Plan of Care Expires: 09/27/18  · Plan of Care Reviewed with: patient    This Plan of care has been discussed with the patient who was involved in its development and understands and is in agreement with the identified goals and treatment plan    GOALS:   Multidisciplinary Problems     Occupational Therapy Goals        Problem: Occupational Therapy Goal    Goal Priority Disciplines Outcome Interventions   Occupational Therapy Goal     OT, PT/OT Ongoing (interventions implemented as appropriate)    Description:  Goals to be met by: 8/31/2018    Patient will increase functional independence with ADLs by performing:    Feeding with Modified [place].  UE Dressing with Set-up Assistance.  LE Dressing with Minimal Assistance.  Grooming while seated with Set-up Assistance.  Toileting from bedside commode with Minimal Assistance for hygiene and clothing management.   Sitting  at edge of bed x20 minutes with Supervision.  Rolling to Bilateral with Modified [place].   Supine to sit with Modified [place].  Stand pivot transfers with Stand-by Assistance.  Step transfer with Supervision and Stand-by Assistance  Toilet transfer to bedside commode with Stand-by Assistance.  Increased functional strength to WFL for BUE.  Upper extremity exercise program 10 reps per handout, with supervision.                      Time Tracking:     OT Date of Treatment: 08/30/18  OT Start Time: 1345  OT Stop Time: 1417  OT Total Time (min): 32 min    Billable Minutes:Self Care/Home Management 15  Therapeutic Activity 17  Total Time 32    NAVI Jackson  8/30/2018

## 2018-08-30 NOTE — PLAN OF CARE
Problem: Patient Care Overview  Goal: Plan of Care Review  Outcome: Ongoing (interventions implemented as appropriate)  Patient on oxygen with documented flow.  Will attempt to wean per O2 order protocol.  Will continue to monitor.

## 2018-08-30 NOTE — PROGRESS NOTES
PGY-2 Progress Note [facility]  Follow up for: open appendetomy    Chief Complaint   Patient presents with    Altered Mental Status     To ER with EMS stating that the family called for altered mental status but was awake and alert when they arrived.  pt with slurred speech and has slept alot today, taking pain medication for an injury one week ago.     Hospital Stay Day 7    Subjective: Afebrile, BP elevated, good uop, on 4L NC, denies shortness of breath.  Reports wheezing.  Reports decreased appetite.      Denies any CP, SOB, N/V/D, headaches, fever or chills.     Scheduled Meds:   albuterol-ipratropium  3 mL Nebulization Q4H WAKE    aspirin  81 mg Oral Daily    atorvastatin  40 mg Oral Daily    ciprofloxacin  400 mg Intravenous Q12H    clopidogrel  75 mg Oral Daily    diclofenac sodium   Topical (Top) Daily    diltiaZEM  180 mg Oral Daily    enoxaparin  30 mg Subcutaneous Daily    famotidine  20 mg Oral BID    furosemide  40 mg Oral BID    lidocaine  1 patch Transdermal Q24H    magnesium sulfate IVPB  2 g Intravenous Once    metronidazole  500 mg Intravenous Q8H    predniSONE  20 mg Per OG tube BID    sodium chloride 0.9%  3 mL Intravenous Q8H    theophylline  200 mg Oral Daily    tobramycin-dexamethasone 0.3-0.1%  1 drop Both Eyes Q4H While awake    vancomycin (VANCOCIN) IVPB  15 mg/kg (Order-Specific) Intravenous Q24H     Continuous Infusions:  PRN Meds:acetaminophen, albuterol, artificial tears, dextrose 50%, diphenhydrAMINE, glucagon (human recombinant), glucose, glucose, HYDROmorphone, insulin aspart U-100, labetalol, lidocaine-prilocaine, loperamide, ondansetron, traMADol    Review of patient's allergies indicates:   Allergen Reactions    Ace inhibitors Swelling    Corticosteroids (glucocorticoids)     Hydralazine analogues     Tetracyclines Swelling    Travatan (with benzalkonium) [travoprost (benzalkonium)]        Objectives:     Vitals(Most Recent)      BP  Min: 139/75  Max:  184/84  Temp  Av.7 °F (36.5 °C)  Min: 96.9 °F (36.1 °C)  Max: 98.4 °F (36.9 °C)  Pulse  Av.3  Min: 65  Max: 128  Resp  Av.6  Min: 14  Max: 24  SpO2  Av.2 %  Min: 89 %  Max: 100 %             Vitals(Rina65h)  Temp:  [96.9 °F (36.1 °C)-98.4 °F (36.9 °C)]   Pulse:  []   Resp:  [14-24]   BP: (139-184)/(75-89)   SpO2:  [89 %-100 %]     I & O(Wbfu36h)    Intake/Output Summary (Last 24 hours) at 2018  Last data filed at 2018 0756  Gross per 24 hour   Intake 1735 ml   Output 650 ml   Net 1085 ml     General: AAOx3. NAD. Resting comfortably in bed, on 4L NC, audible wheezing  HEENT: NCAT. PERRLA.    Neck: Supple.  CV: RRR, no murmur   Chest: inspiratory and expiratory wheezes present throughout all lung field.     Abd: Soft. ND, TTP at surgical site.  Ext: no edema appreciated  Skin: Intact. No rash. No lesions.   Neuro: CN II-XII intact. No focal deficit. Strength 5/5 throughout. Sensation intact.   Psych: Good judgement and reason. No A/V hallucinations. NL affect. No abnormal behaviors noted    LABS  CBC  Recent Labs   Lab  18   0445   WBC  25.00*  23.90*  16.09*   RBC  3.31*  3.23*  3.14*   HGB  8.0*  7.9*  7.5*   HCT  26.4*  26.1*  26.3*   PLT  281  300  384*   MCV  80*  81*  84   MCH  24.2*  24.5*  23.9*   MCHC  30.3*  30.3*  28.5*     BMP  Recent Labs   Lab  18   0445   NA  135*  141  140   K  3.9  3.7  3.8   CO2  27  31*  33*   CL  99  102  100   BUN  12  15  15   CREATININE  1.4  1.1  1.0   GLU  236*  130*  149*     POCT-Glucose  POCT Glucose   Date Value Ref Range Status   2018 176 (H) 70 - 110 mg/dL Final   2018 162 (H) 70 - 110 mg/dL Final   2018 182 (H) 70 - 110 mg/dL Final   2018 166 (H) 70 - 110 mg/dL Final   2018 162 (H) 70 - 110 mg/dL Final   2018 149 (H) 70 - 110 mg/dL Final   2018 171 (H) 70 - 110 mg/dL Final   2018 168 (H) 70 - 110 mg/dL  Final   08/28/2018 229 (H) 70 - 110 mg/dL Final   08/27/2018 188 (H) 70 - 110 mg/dL Final   08/27/2018 173 (H) 70 - 110 mg/dL Final   08/27/2018 136 (H) 70 - 110 mg/dL Final       Recent Labs   Lab  08/28/18   0421  08/29/18   0451  08/30/18   0445   CALCIUM  8.2*  8.4*  8.7   MG  1.8  1.8  1.7   PHOS  2.6*  2.3*  2.4*     LFT  Recent Labs   Lab  08/28/18   0421  08/29/18   0451  08/30/18   0445   PROT  5.7*  6.1  6.1   ALBUMIN  2.1*  2.1*  2.1*   BILITOT  0.7  0.3  0.3   AST  41*  33  22   ALKPHOS  106  122  115   ALT  22  23  18         COAGS  No results for input(s): PT, INR, APTT in the last 168 hours.  CE  Recent Labs   Lab  08/23/18   1930  08/25/18   0624   TROPONINI  0.233*  0.098*     ABGs  No results for input(s): PH, PCO2, PO2, HCO3, POCSATURATED, BE in the last 24 hours.  BNP  Recent Labs   Lab  08/26/18   1550   BNP  45     UA  No results for input(s): COLORU, CLARITYU, SPECGRAV, PHUR, PROTEINUA, GLUCOSEU, BLOODU, WBCU, RBCU, BACTERIA, MUCUS in the last 24 hours.    Invalid input(s):  BILIRUBINCON  LAST HbA1c  Lab Results   Component Value Date    HGBA1C 8.1 (H) 08/23/2018           Imaging  Imaging Results          US Carotid Bilateral (Final result)  Result time 08/23/18 09:08:55    Final result by Marc Lemos DO (08/23/18 09:08:55)                 Impression:      Limited study by body habitus.    Allowing for limitation, 50-69% right ICA stenosis with less than 50% left ICA stenosis.    Antegrade vertebral arteries bilaterally..      Electronically signed by: Marc Lemos DO  Date:    08/23/2018  Time:    09:08             Narrative:    EXAMINATION:  US CAROTID BILATERAL    CLINICAL HISTORY:  stroke; Altered mental status, unspecified    TECHNIQUE:  Grayscale and color Doppler ultrasound examination of the carotid and vertebral artery systems bilaterally.    COMPARISON:  None.    FINDINGS:  Results: the right common carotid artery is patent. The peak systolic velocities approximately 47 cm/sec  which is within normal limits.  . The peak systolic velocities of the right proximal, mid and distal internal carotid artery are approximately 47, 78, and 178 cm/sec respectively this corresponds to an ICA/CCA ratio of approximately 3.7 which is within normal limits.  Please note evaluation limited by body habitus with difficulty penetrating within limits of the study increased velocity the concerning for 50-69% ICA stenosis.    The right vertebral artery is antegrade.    The left common carotid artery is patent. The peak systolic velocity is approximately 45 cm/sec which is within normal limits. Peak systolic velocity of the proximal, mid and distal left internal carotid arteries is approximately 38, 49, and 49 cm/sec respectively. This corresponds to an ICA/CCA ratio of approximately 1.1 which is within normal limits.    The left vertebral artery is antegrade.    Measurement of carotid stenosis is based on velocity parameters that correlate the residual internal carotid diameter with [place] symptomatic carotid endarterectomy trial (NASCET) - based stenosis levels.                               CT Head Without Contrast (Final result)  Result time 08/22/18 22:19:26    Final result by Darwin Cool MD (08/22/18 22:19:26)                 Impression:      1. Apparent punctate foci of parenchymal hypoattenuation within the bilateral thalami concerning for age indeterminate lacunar infarcts, presumably old.  Clinical correlation is recommended.      Electronically signed by: Darwin Cool MD  Date:    08/22/2018  Time:    22:19             Narrative:    EXAMINATION:  CT HEAD WITHOUT CONTRAST    CLINICAL HISTORY:  Confusion/delirium, altered LOC, unexplained;    TECHNIQUE:  5-mm axial images were obtained through the head without the use of contrast.  Coronal and sagittal reformats were performed.    COMPARISON:  None.    FINDINGS:  Punctate foci of parenchymal hypoattenuation noted within the bilateral  thalami concerning for age indeterminate lacunar infarcts, presumably old.  No CT findings to suggest an acute major vascular distribution infarct.  No intra or extra-axial hemorrhage.  No midline shift or mass effect.  No hydrocephalus.  Sellar region is unremarkable.    Paranasal sinuses and mastoid air cells are clear.  There is heterogeneity of the calvarium, possibly related to an underlying metabolic disorder.  Subcutaneous soft tissues are normal.                               X-Ray Chest 1 View (Final result)  Result time 08/22/18 22:21:33    Final result by Darwin Cool MD (08/22/18 22:21:33)                 Impression:      1. No acute radiographic findings in the chest on this single view.      Electronically signed by: Darwin Cool MD  Date:    08/22/2018  Time:    22:21             Narrative:    EXAMINATION:  XR CHEST 1 VIEW    CLINICAL HISTORY:  AMS;    TECHNIQUE:  Single frontal view of the chest was performed.    COMPARISON:  None    FINDINGS:  Partially visualized catheter projects to the right of midline, presumably over the SVC.  Spinal cord stimulator projects over the midline thorax.  Mediastinal structures are midline.  Cardiac silhouette is magnified by AP technique.  Lung volumes are symmetric.  No consolidation.  No pneumothorax or pleural effusions.  No free air beneath the diaphragm.  Degenerative changes of the shoulders noted.                                Micro:  Microbiology Results (last 7 days)     Procedure Component Value Units Date/Time    Culture, Anaerobe [[id number]] Collected:  08/26/18 1015    Order Status:  Completed Specimen:  Wound from Abdomen Updated:  08/29/18 1446     Anaerobic Culture --     EGGERTHELLA LENTA  Moderate       Anaerobic Culture --     BACTEROIDES FRAGILIS  Moderate       Anaerobic Culture --     BACTEROIDES THETAIOTAOMICRON  Moderate      Narrative:       APPENDIX    Blood culture [[id number]] Collected:  08/27/18 0809    Order Status:   Completed Specimen:  Blood Updated:  08/29/18 1212     Blood Culture, Routine No Growth to date     Blood Culture, Routine No Growth to date     Blood Culture, Routine No Growth to date    Blood culture [[id number]] Collected:  08/27/18 0809    Order Status:  Completed Specimen:  Blood Updated:  08/29/18 1212     Blood Culture, Routine No Growth to date     Blood Culture, Routine No Growth to date     Blood Culture, Routine No Growth to date    Culture, Respiratory with Gram Stain [[id number]] Collected:  08/27/18 0752    Order Status:  Completed Specimen:  Respiratory from Endotracheal Aspirate Updated:  08/29/18 1138     Respiratory Culture Normal respiratory david     Gram Stain (Respiratory) <10 epithelial cells per low power field.     Gram Stain (Respiratory) Few WBC's     Gram Stain (Respiratory) No organisms seen    Blood culture [[id number]] Collected:  08/23/18 2041    Order Status:  Completed Specimen:  Blood Updated:  08/29/18 0612     Blood Culture, Routine No growth after 5 days.    Blood culture [[id number]] Collected:  08/23/18 2041    Order Status:  Completed Specimen:  Blood Updated:  08/29/18 0612     Blood Culture, Routine No growth after 5 days.    C Diff Toxin by PCR [[id number]]  (Abnormal) Collected:  08/28/18 1834    Order Status:  Completed Updated:  08/29/18 0348     C. diff PCR Positive    Clostridium difficile EIA [[id number]]  (Abnormal) Collected:  08/28/18 1834    Order Status:  Completed Specimen:  Stool Updated:  08/28/18 2128     C. diff Antigen Positive     C difficile Toxins A+B, EIA Negative     Comment: Testing not recommended for children <24 months old.       Aerobic culture [[id number]]  (Susceptibility) Collected:  08/26/18 1015    Order Status:  Completed Specimen:  Wound from Abdomen Updated:  08/28/18 1443     Aerobic Bacterial Culture --     ESCHERICHIA COLI  Moderate       Aerobic Bacterial Culture --     ENTEROCOCCUS RAFFINOSUS  Moderate      Narrative:       APPENDIX     Blood culture [[id number]]     Order Status:  Canceled Specimen:  Blood     Blood culture [[id number]]     Order Status:  Canceled Specimen:  Blood     Gram stain [[id number]] Collected:  08/26/18 1015    Order Status:  Completed Specimen:  Wound from Abdomen Updated:  08/26/18 1356     Gram Stain Result Moderate WBC's      Many Gram positive cocci      Many Gram positive rods    Narrative:       APPENDIX    Urine culture [[id number]] Collected:  08/25/18 0628    Order Status:  Completed Specimen:  Urine, Clean Catch Updated:  08/26/18 1144     Urine Culture, Routine No growth           Assessment/Plan:   63 y.o. 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  Patient went to OR Sunday AM for appendectomy. On floor stable, concerns for possible asthma exacerbation     Appendiceal Abscess  - Blood cultures x 2 - Negative   - POD#4 for open appendectomy   - Abx- vanc, flagyl, cipro  - Appendix culture- positive for E coli and enterococcus raffinosus.  - E coli- sensitive to cipro  - Enterococcus raffinosus- sensitive to vanc and genta  - Consider ID consult for ABX regimen      NIKHIL  - BUN/Crea of 26/2.5 on admit  - improved to 16/1.3 on 8/25  - BUN/Crea of 15/1.0 this AM   - continue to monitor     CVA    CT head is negative for acute hemorrhagic event  Anti-platelet therapy: ASA 81/Plavix 75mg   Atorvastatin 40mg PO daily  Q4 nuero checks  PT/OT/ST  Fall precautions  Drug screen pos for opiates      Carotid Doppler ordered- Allowing for limitation, 50-69% right ICA stenosis with less than 50% left ICA stenosis.  Antegrade vertebral arteries bilaterally.     2D echo with bubble study- 1 - Concentric remodeling.     2 - No wall motion abnormalities.     3 - Normal left ventricular systolic function (EF 60-65%).     4 - Impaired LV relaxation, normal LAP (grade 1 diastolic dysfunction).     5 - Normal right ventricular systolic  function .     6 - The estimated PA systolic pressure is 14 mmHg.     7 - No evidence of intracardiac shunt.      EEG performed- encephalopathy      CTA head and neck Unremarkable and without evidence for proximal significant stenosis or occlusion.     HTN  Clonidine .1mg PO BID  Lasix 40mg PO BID  Diltiazem 180mg ER PO daily      T2DM without complication   -Patient on moderate dose SSI     CAD  - Continue ASA and plavix and Atorvastatin      Elevated troponin- resolving   - 0.273-->0.267--> 0.233--> .098         PT/OT: ordered and following  Code: full  Diet: full  Ppx: dvt: lovenox   Dispo: f/u ID, surgery recs, and resp status     Case discussed with staff    Leta Jovel MD  [facility] HO2  08/30/2018

## 2018-08-31 LAB
ALBUMIN SERPL BCP-MCNC: 2.1 G/DL
ALP SERPL-CCNC: 112 U/L
ALT SERPL W/O P-5'-P-CCNC: 18 U/L
ANION GAP SERPL CALC-SCNC: 7 MMOL/L
AST SERPL-CCNC: 27 U/L
BACTERIA SPEC ANAEROBE CULT: NORMAL
BASOPHILS # BLD AUTO: 0.01 K/UL
BASOPHILS NFR BLD: 0.1 %
BILIRUB SERPL-MCNC: 0.3 MG/DL
BUN SERPL-MCNC: 14 MG/DL
CALCIUM SERPL-MCNC: 9.3 MG/DL
CHLORIDE SERPL-SCNC: 94 MMOL/L
CO2 SERPL-SCNC: 36 MMOL/L
CREAT SERPL-MCNC: 1 MG/DL
DIFFERENTIAL METHOD: ABNORMAL
EOSINOPHIL # BLD AUTO: 0 K/UL
EOSINOPHIL NFR BLD: 0 %
ERYTHROCYTE [DISTWIDTH] IN BLOOD BY AUTOMATED COUNT: 16.4 %
EST. GFR  (AFRICAN AMERICAN): >60 ML/MIN/1.73 M^2
EST. GFR  (NON AFRICAN AMERICAN): >60 ML/MIN/1.73 M^2
GLUCOSE SERPL-MCNC: 213 MG/DL
HCT VFR BLD AUTO: 26.9 %
HGB BLD-MCNC: 7.9 G/DL
LYMPHOCYTES # BLD AUTO: 1.2 K/UL
LYMPHOCYTES NFR BLD: 11.2 %
MAGNESIUM SERPL-MCNC: 1.6 MG/DL
MCH RBC QN AUTO: 23.9 PG
MCHC RBC AUTO-ENTMCNC: 29.4 G/DL
MCV RBC AUTO: 81 FL
MONOCYTES # BLD AUTO: 1 K/UL
MONOCYTES NFR BLD: 9.4 %
NEUTROPHILS # BLD AUTO: 8.4 K/UL
NEUTROPHILS NFR BLD: 78.8 %
PHOSPHATE SERPL-MCNC: 1.9 MG/DL
PLATELET # BLD AUTO: 431 K/UL
PMV BLD AUTO: 8.9 FL
POCT GLUCOSE: 217 MG/DL (ref 70–110)
POCT GLUCOSE: 249 MG/DL (ref 70–110)
POCT GLUCOSE: 319 MG/DL (ref 70–110)
POTASSIUM SERPL-SCNC: 3.8 MMOL/L
PROT SERPL-MCNC: 6.1 G/DL
RBC # BLD AUTO: 3.31 M/UL
SODIUM SERPL-SCNC: 137 MMOL/L
WBC # BLD AUTO: 10.67 K/UL

## 2018-08-31 PROCEDURE — 84100 ASSAY OF PHOSPHORUS: CPT

## 2018-08-31 PROCEDURE — 25000003 PHARM REV CODE 250: Performed by: HOSPITALIST

## 2018-08-31 PROCEDURE — 80053 COMPREHEN METABOLIC PANEL: CPT

## 2018-08-31 PROCEDURE — 97535 SELF CARE MNGMENT TRAINING: CPT

## 2018-08-31 PROCEDURE — 63600175 PHARM REV CODE 636 W HCPCS: Performed by: STUDENT IN AN ORGANIZED HEALTH CARE EDUCATION/TRAINING PROGRAM

## 2018-08-31 PROCEDURE — 27000221 HC OXYGEN, UP TO 24 HOURS

## 2018-08-31 PROCEDURE — 97530 THERAPEUTIC ACTIVITIES: CPT

## 2018-08-31 PROCEDURE — 25000003 PHARM REV CODE 250: Performed by: OPHTHALMOLOGY

## 2018-08-31 PROCEDURE — 27000646 HC AEROBIKA DEVICE

## 2018-08-31 PROCEDURE — 94664 DEMO&/EVAL PT USE INHALER: CPT

## 2018-08-31 PROCEDURE — 99900035 HC TECH TIME PER 15 MIN (STAT)

## 2018-08-31 PROCEDURE — 25000003 PHARM REV CODE 250: Performed by: STUDENT IN AN ORGANIZED HEALTH CARE EDUCATION/TRAINING PROGRAM

## 2018-08-31 PROCEDURE — 94761 N-INVAS EAR/PLS OXIMETRY MLT: CPT

## 2018-08-31 PROCEDURE — A4216 STERILE WATER/SALINE, 10 ML: HCPCS | Performed by: STUDENT IN AN ORGANIZED HEALTH CARE EDUCATION/TRAINING PROGRAM

## 2018-08-31 PROCEDURE — 25000003 PHARM REV CODE 250: Performed by: FAMILY MEDICINE

## 2018-08-31 PROCEDURE — 94799 UNLISTED PULMONARY SVC/PX: CPT

## 2018-08-31 PROCEDURE — 94640 AIRWAY INHALATION TREATMENT: CPT

## 2018-08-31 PROCEDURE — 97110 THERAPEUTIC EXERCISES: CPT

## 2018-08-31 PROCEDURE — 25000003 PHARM REV CODE 250: Performed by: INTERNAL MEDICINE

## 2018-08-31 PROCEDURE — 85025 COMPLETE CBC W/AUTO DIFF WBC: CPT

## 2018-08-31 PROCEDURE — 11000001 HC ACUTE MED/SURG PRIVATE ROOM

## 2018-08-31 PROCEDURE — 25500020 PHARM REV CODE 255: Performed by: FAMILY MEDICINE

## 2018-08-31 PROCEDURE — 97803 MED NUTRITION INDIV SUBSEQ: CPT

## 2018-08-31 PROCEDURE — 83735 ASSAY OF MAGNESIUM: CPT

## 2018-08-31 PROCEDURE — 25000242 PHARM REV CODE 250 ALT 637 W/ HCPCS: Performed by: STUDENT IN AN ORGANIZED HEALTH CARE EDUCATION/TRAINING PROGRAM

## 2018-08-31 RX ORDER — SIMETHICONE 125 MG
125 TABLET,CHEWABLE ORAL EVERY 6 HOURS PRN
Status: DISCONTINUED | OUTPATIENT
Start: 2018-08-31 | End: 2018-09-05 | Stop reason: HOSPADM

## 2018-08-31 RX ORDER — PRAZOSIN HYDROCHLORIDE 1 MG/1
5 CAPSULE ORAL 2 TIMES DAILY
Status: DISCONTINUED | OUTPATIENT
Start: 2018-08-31 | End: 2018-09-05 | Stop reason: HOSPADM

## 2018-08-31 RX ORDER — LABETALOL HYDROCHLORIDE 5 MG/ML
10 INJECTION, SOLUTION INTRAVENOUS EVERY 4 HOURS PRN
Status: DISCONTINUED | OUTPATIENT
Start: 2018-08-31 | End: 2018-09-05 | Stop reason: HOSPADM

## 2018-08-31 RX ADMIN — DICLOFENAC: 10 GEL TOPICAL at 09:08

## 2018-08-31 RX ADMIN — IPRATROPIUM BROMIDE AND ALBUTEROL SULFATE 3 ML: .5; 3 SOLUTION RESPIRATORY (INHALATION) at 08:08

## 2018-08-31 RX ADMIN — METRONIDAZOLE 500 MG: 500 TABLET ORAL at 09:08

## 2018-08-31 RX ADMIN — ASPIRIN 81 MG: 81 TABLET, COATED ORAL at 07:08

## 2018-08-31 RX ADMIN — PREDNISONE 20 MG: 20 TABLET ORAL at 07:08

## 2018-08-31 RX ADMIN — THEOPHYLLINE ANHYDROUS 200 MG: 100 CAPSULE, EXTENDED RELEASE ORAL at 07:08

## 2018-08-31 RX ADMIN — ENOXAPARIN SODIUM 30 MG: 100 INJECTION SUBCUTANEOUS at 06:08

## 2018-08-31 RX ADMIN — IOHEXOL 100 ML: 350 INJECTION, SOLUTION INTRAVENOUS at 10:08

## 2018-08-31 RX ADMIN — CLOPIDOGREL BISULFATE 75 MG: 75 TABLET ORAL at 07:08

## 2018-08-31 RX ADMIN — TRAMADOL HYDROCHLORIDE 50 MG: 50 TABLET, COATED ORAL at 07:08

## 2018-08-31 RX ADMIN — FUROSEMIDE 40 MG: 40 TABLET ORAL at 08:08

## 2018-08-31 RX ADMIN — METRONIDAZOLE 500 MG: 500 TABLET ORAL at 03:08

## 2018-08-31 RX ADMIN — IPRATROPIUM BROMIDE AND ALBUTEROL SULFATE 3 ML: .5; 3 SOLUTION RESPIRATORY (INHALATION) at 12:08

## 2018-08-31 RX ADMIN — Medication 500 MG: at 12:08

## 2018-08-31 RX ADMIN — TOBRAMYCIN AND DEXAMETHASONE 1 DROP: 3; 1 SUSPENSION/ DROPS OPHTHALMIC at 10:08

## 2018-08-31 RX ADMIN — Medication 500 MG: at 06:08

## 2018-08-31 RX ADMIN — Medication 3 ML: at 02:08

## 2018-08-31 RX ADMIN — TOBRAMYCIN AND DEXAMETHASONE 1 DROP: 3; 1 SUSPENSION/ DROPS OPHTHALMIC at 06:08

## 2018-08-31 RX ADMIN — FAMOTIDINE 20 MG: 20 TABLET, FILM COATED ORAL at 08:08

## 2018-08-31 RX ADMIN — Medication 500 MG: at 03:08

## 2018-08-31 RX ADMIN — TOBRAMYCIN AND DEXAMETHASONE 1 DROP: 3; 1 SUSPENSION/ DROPS OPHTHALMIC at 03:08

## 2018-08-31 RX ADMIN — Medication 3 ML: at 06:08

## 2018-08-31 RX ADMIN — IPRATROPIUM BROMIDE AND ALBUTEROL SULFATE 3 ML: .5; 3 SOLUTION RESPIRATORY (INHALATION) at 07:08

## 2018-08-31 RX ADMIN — Medication 3 ML: at 10:08

## 2018-08-31 RX ADMIN — PRAZOSIN HYDROCHLORIDE 5 MG: 1 CAPSULE ORAL at 08:08

## 2018-08-31 RX ADMIN — FUROSEMIDE 40 MG: 40 TABLET ORAL at 07:08

## 2018-08-31 RX ADMIN — IPRATROPIUM BROMIDE AND ALBUTEROL SULFATE 3 ML: .5; 3 SOLUTION RESPIRATORY (INHALATION) at 04:08

## 2018-08-31 RX ADMIN — LABETALOL HYDROCHLORIDE 10 MG: 5 INJECTION, SOLUTION INTRAVENOUS at 10:08

## 2018-08-31 RX ADMIN — TOBRAMYCIN AND DEXAMETHASONE 1 DROP: 3; 1 SUSPENSION/ DROPS OPHTHALMIC at 09:08

## 2018-08-31 RX ADMIN — PREDNISONE 20 MG: 20 TABLET ORAL at 08:08

## 2018-08-31 RX ADMIN — ONDANSETRON 8 MG: 8 TABLET, ORALLY DISINTEGRATING ORAL at 06:08

## 2018-08-31 RX ADMIN — ATORVASTATIN CALCIUM 40 MG: 40 TABLET, FILM COATED ORAL at 07:08

## 2018-08-31 RX ADMIN — LOSARTAN POTASSIUM 100 MG: 50 TABLET, FILM COATED ORAL at 07:08

## 2018-08-31 RX ADMIN — LIDOCAINE 1 PATCH: 50 PATCH CUTANEOUS at 12:08

## 2018-08-31 RX ADMIN — PROMETHAZINE HYDROCHLORIDE 6.25 MG: 25 INJECTION INTRAMUSCULAR; INTRAVENOUS at 08:08

## 2018-08-31 RX ADMIN — INSULIN ASPART 4 UNITS: 100 INJECTION, SOLUTION INTRAVENOUS; SUBCUTANEOUS at 08:08

## 2018-08-31 RX ADMIN — DILTIAZEM HYDROCHLORIDE 180 MG: 180 CAPSULE, EXTENDED RELEASE ORAL at 07:08

## 2018-08-31 RX ADMIN — FAMOTIDINE 20 MG: 20 TABLET, FILM COATED ORAL at 07:08

## 2018-08-31 RX ADMIN — Medication 500 MG: at 09:08

## 2018-08-31 RX ADMIN — METRONIDAZOLE 500 MG: 500 TABLET ORAL at 06:08

## 2018-08-31 NOTE — PLAN OF CARE
Patient with pmhx of T2DM, HTN, CAD, and pontine stroke in 2012 with residual right sided deficits presenting with slurred speech and AMS admitted on 8/23.  CVA work-up was negative.  Patient endorsed lower abdominal pain and imaging was obtained concerning for acute appendicitis with distal distal appendiceal perforation vs gas-forming infection.  Patient went to OR with Dr. Melendrez for laparotomy on 8/26/2018.  Abscess was well contained and wash out performed.  Patient was extubated after procedure and re-intubated in ICU.  She remained intubated and sedated for 24 hours. Pulm was consulted for vent management. Successfully extubated and stepped to floor.  Patient developed C.diff. Placed on vanc for C.diff and flagyl for concerns of intra-abdominal infection.  ID ordered CT abdo revealed extensive inflammatory stranding seen in the right lower quadrant. ID recommended surgical recommendations.  Surgery requested IR evaluation for drainage.  IR states not able to drain today given patient not NPO, not concerned for infection as vitals stable and WBC WNL.  Can continue to monitor.  F/u with ID, surgery, and possible IR recs.  Patient will be discharged home with home-health.

## 2018-08-31 NOTE — PLAN OF CARE
Problem: Physical Therapy Goal  Goal: Physical Therapy Goal  Goals to be met by: 9/27/2018     Patient will increase functional independence with mobility by performing:    Updated 8/27/2018: Mona-Mariangel completed this date  1. Supine to sit with Modified [place]  2. Sit to supine with Modified [place]  3. Rolling to Left and Right with Modified [place].  4. Sit to stand transfer with Stand-by Assistance with AD  5. Bed to chair transfer with Stand-by Assistance with AD  6. Gait  x  feet with Stand-by Assistance using AD.   7. Ascend/Descend 8 inch curb step with Contact Guard Assistance and Minimal Assistance using AD  8. Lower extremity exercise program x10 reps with supervision      Outcome: Ongoing (interventions implemented as appropriate)  Continue working toward goals.

## 2018-08-31 NOTE — PT/OT/SLP PROGRESS
"Physical Therapy Treatment    Patient Name:  Sheryl Martines   MRN:  [id number]    Recommendations:     Discharge Recommendations:  nursing facility, skilled   Discharge Equipment Recommendations: ( RW)   Barriers to discharge: Inaccessible home and Decreased caregiver support    Assessment:     Sheryl Martines is a 63 y.o. female admitted with a medical diagnosis of Appendicitis with peritonitis.  She presents with the following impairments/functional limitations:  weakness, impaired endurance, impaired functional mobilty, impaired self care skills, gait instability, impaired balance, decreased upper extremity function, decreased lower extremity function, decreased coordination, decreased safety awareness, decreased ROM, impaired cardiopulmonary response to activity.  Pt would continue to benefit from P.T. To address impairments listed above.    Rehab Prognosis:  Fair+; patient would benefit from acute skilled PT services to address these deficits and reach maximum level of function.      Recent Surgery: Procedure(s) (LRB):  APPENDECTOMY, LAPAROSCOPIC---CONVERTED TO OPEN APPENDECTOMY @0950 (N/A)  APPENDECTOMY (N/A) 5 Days Post-Op    Plan:     During this hospitalization, patient to be seen 6 x/week to address the above listed problems via gait training, therapeutic activities, therapeutic exercises  · Plan of Care Expires:  09/27/18   Plan of Care Reviewed with: patient    Subjective     Communicated with RN (Katherine) prior to session.  Patient found supine upon PT entry to room, agreeable to treatment.        Patient comments/goals: Pt agreed to tx.  "I have to go to the bathroom."  Pain/Comfort:  · Pain Rating 1: (no c/o pain during tx.)  · Pain Rating Post-Intervention 1: (as above)    Patients cultural, spiritual, Jew conflicts given the current situation: none reported    Objective:     Patient found with: bed alarm, oxygen     General Precautions: Standard, fall   Orthopedic Precautions:N/A   Braces:   " "    Functional Mobility:  · Bed Mobility:     · Rolling Right: stand by assistance  · Scooting: stand by assistance and to EOB  · Supine to Sit: stand by assistance and with pt elevating HOB  · Transfers:     · Sit to Stand:   minimum assistance.   [place] to stabilize Rw when standing from EOB and CGA for pt safety, and from toilet, Min A to assist pt off toilet and stabilize RW with pt using grab bar also.  · Toilet Transfer: minimum assistance with  rolling walker and grab bars  using  Step Transfer  · Gait: 6ft x 2 (to and from bathroom) with RW and close CGA and vc's. Pt ambulates with decreased step length, decreased shelley and no heel strike.  · Balance: sitting good, standing fair with Rw, gait fair/fair- with RW      AM-PAC 6 CLICK MOBILITY  Turning over in bed (including adjusting bedclothes, sheets and blankets)?: 3  Sitting down on and standing up from a chair with arms (e.g., wheelchair, bedside commode, etc.): 3  Moving from lying on back to sitting on the side of the bed?: 3  Moving to and from a bed to a chair (including a wheelchair)?: 3  Need to walk in hospital room?: 3  Climbing 3-5 steps with a railing?: 1  Basic Mobility Total Score: 16       Therapeutic Activities and Exercises:   Seated BLE therex: AP, LAQ, hip flexion/ABd/ADD, and glut sets x 12 reps.  Transfer onto and off of toilet with [place] and vc's for hand placement on GB and RW for increased assistance.  Pt does not follow vc's for hand placement when standing from EOB and having one hand on Rw and one on stable surface (EOB).  Pt told PTA, 'This is the way I need to do it," placing both hands at the front of RW, pass hand , and asking PTA hold RW down in front to stabilize as pt pulls up on RW.  Pt was instructed in the safe, proper way to perform sit <> stand using a RW.  Pt was able to self clean after urinating.    Patient left up in chair with all lines intact, call button in reach, chair alarm on and Rn notified..    GOALS: "   Multidisciplinary Problems     Physical Therapy Goals        Problem: Physical Therapy Goal    Goal Priority Disciplines Outcome Goal Variances Interventions   Physical Therapy Goal     PT, PT/OT Ongoing (interventions implemented as appropriate)     Description:  Goals to be met by: 9/27/2018     Patient will increase functional independence with mobility by performing:    Updated 8/27/2018: Mona-Mariangel completed this date  1. Supine to sit with Modified [place]  2. Sit to supine with Modified [place]  3. Rolling to Left and Right with Modified [place].  4. Sit to stand transfer with Stand-by Assistance with AD  5. Bed to chair transfer with Stand-by Assistance with AD  6. Gait  x  feet with Stand-by Assistance using AD.   7. Ascend/Descend 8 inch curb step with Contact Guard Assistance and Minimal Assistance using AD  8. Lower extremity exercise program x10 reps with supervision              Problem: Physical Therapy Goal    Goal Priority Disciplines Outcome Goal Variances Interventions   Physical Therapy Goal     PT, PT/OT                      Time Tracking:     PT Received On: 08/31/18  PT Start Time: 1306     PT Stop Time: 1332  PT Total Time (min): 26 min     Billable Minutes: Therapeutic Activity 18 and Therapeutic Exercise 8    Treatment Type: Treatment  PT/PTA: PTA     PTA Visit Number: 1     Geneva Roman PTA  08/31/2018

## 2018-08-31 NOTE — PROGRESS NOTES
Progress Note  Surgery  Admit Date: 8/22/2018   LOS: 8 days   SUBJECTIVE:   Follow-up For: Open appendectomy     Patient seen and examined this AM at bedside, the patient endorses not eating despite having food brought in by her daughters. She denies having an appetite. She denies any increased pain at incision site, or drainage. Denies fever, chills and CP.       OBJECTIVE:   Vital Signs (Most Recent)  Temp: 97.8 °F (36.6 °C) (08/31/18 0836)  Pulse: 67 (08/31/18 0836)  Resp: 18 (08/31/18 0836)  BP: (!) 195/90 (08/31/18 0836)  SpO2: 99 % (08/31/18 0807)    I & O (Last 24H):    Intake/Output Summary (Last 24 hours) at 8/31/2018 0909  Last data filed at 8/31/2018 0800  Gross per 24 hour   Intake --   Output 1250 ml   Net -1250 ml     Wt Readings from Last 3 Encounters:   08/29/18 88 kg (194 lb 0.1 oz)   08/14/18 81.6 kg (180 lb)       Current Diet Order   Procedures    Diet [place]     soft        Physical Exam   Constitutional: She is well-developed, well-nourished, and in no distress.   Pulmonary/Chest: Effort normal and breath sounds normal.   Abdominal: Soft. Bowel sounds are normal.   TTP at surgical incision site, staples in place, no erythema, warmth or drainage    Nursing note and vitals reviewed.        Laboratory Data:  CBC  Recent Labs   Lab  08/29/18   0451 08/30/18   0445  08/31/18   0450   WBC  23.90*  16.09*  10.67   RBC  3.23*  3.14*  3.31*   HGB  7.9*  7.5*  7.9*   HCT  26.1*  26.3*  26.9*   PLT  300  384*  431*   MCV  81*  84  81*   MCH  24.5*  23.9*  23.9*   MCHC  30.3*  28.5*  29.4*     CMP  Recent Labs   Lab  08/29/18   0451  08/30/18   0445  08/31/18   0450   CALCIUM  8.4*  8.7  9.3   PROT  6.1  6.1  6.1   NA  141  140  137   K  3.7  3.8  3.8   CO2  31*  33*  36*   CL  102  100  94*   BUN  15  15  14   CREATININE  1.1  1.0  1.0   ALKPHOS  122  115  112   ALT  23  18  18   AST  33  22  27   BILITOT  0.3  0.3  0.3     POCT-Glucose  POCT Glucose   Date Value Ref Range Status   08/31/2018 217 (H) 70  - 110 mg/dL Final   08/30/2018 241 (H) 70 - 110 mg/dL Final   08/30/2018 285 (H) 70 - 110 mg/dL Final   08/30/2018 238 (H) 70 - 110 mg/dL Final   08/30/2018 275 (H) 70 - 110 mg/dL Final   08/30/2018 176 (H) 70 - 110 mg/dL Final   08/29/2018 162 (H) 70 - 110 mg/dL Final   08/29/2018 182 (H) 70 - 110 mg/dL Final   08/29/2018 166 (H) 70 - 110 mg/dL Final   08/29/2018 162 (H) 70 - 110 mg/dL Final   08/28/2018 149 (H) 70 - 110 mg/dL Final   08/28/2018 171 (H) 70 - 110 mg/dL Final   08/28/2018 168 (H) 70 - 110 mg/dL Final     MICRO  Microbiology Results (last 7 days)     Procedure Component Value Units Date/Time    Blood culture [[id number]] Collected:  08/27/18 0809    Order Status:  Completed Specimen:  Blood Updated:  08/30/18 1212     Blood Culture, Routine No Growth to date     Blood Culture, Routine No Growth to date     Blood Culture, Routine No Growth to date     Blood Culture, Routine No Growth to date    Blood culture [[id number]] Collected:  08/27/18 0809    Order Status:  Completed Specimen:  Blood Updated:  08/30/18 1212     Blood Culture, Routine No Growth to date     Blood Culture, Routine No Growth to date     Blood Culture, Routine No Growth to date     Blood Culture, Routine No Growth to date    Culture, Anaerobe [[id number]] Collected:  08/26/18 1015    Order Status:  Completed Specimen:  Wound from Abdomen Updated:  08/29/18 1446     Anaerobic Culture --     EGGERTHELLA LENTA  Moderate       Anaerobic Culture --     BACTEROIDES FRAGILIS  Moderate       Anaerobic Culture --     BACTEROIDES THETAIOTAOMICRON  Moderate      Narrative:       APPENDIX    Culture, Respiratory with Gram Stain [[id number]] Collected:  08/27/18 0752    Order Status:  Completed Specimen:  Respiratory from Endotracheal Aspirate Updated:  08/29/18 1138     Respiratory Culture Normal respiratory david     Gram Stain (Respiratory) <10 epithelial cells per low power field.     Gram Stain (Respiratory) Few WBC's     Gram Stain  (Respiratory) No organisms seen    Blood culture [[id number]] Collected:  08/23/18 2041    Order Status:  Completed Specimen:  Blood Updated:  08/29/18 0612     Blood Culture, Routine No growth after 5 days.    Blood culture [[id number]] Collected:  08/23/18 2041    Order Status:  Completed Specimen:  Blood Updated:  08/29/18 0612     Blood Culture, Routine No growth after 5 days.    C Diff Toxin by PCR [[id number]]  (Abnormal) Collected:  08/28/18 1834    Order Status:  Completed Updated:  08/29/18 0348     C. diff PCR Positive    Clostridium difficile EIA [[id number]]  (Abnormal) Collected:  08/28/18 1834    Order Status:  Completed Specimen:  Stool Updated:  08/28/18 2128     C. diff Antigen Positive     C difficile Toxins A+B, EIA Negative     Comment: Testing not recommended for children <24 months old.       Aerobic culture [[id number]]  (Susceptibility) Collected:  08/26/18 1015    Order Status:  Completed Specimen:  Wound from Abdomen Updated:  08/28/18 1443     Aerobic Bacterial Culture --     ESCHERICHIA COLI  Moderate       Aerobic Bacterial Culture --     ENTEROCOCCUS RAFFINOSUS  Moderate      Narrative:       APPENDIX    Blood culture [[id number]]     Order Status:  Canceled Specimen:  Blood     Blood culture [[id number]]     Order Status:  Canceled Specimen:  Blood     Gram stain [[id number]] Collected:  08/26/18 1015    Order Status:  Completed Specimen:  Wound from Abdomen Updated:  08/26/18 1356     Gram Stain Result Moderate WBC's      Many Gram positive cocci      Many Gram positive rods    Narrative:       APPENDIX    Urine culture [[id number]] Collected:  08/25/18 0628    Order Status:  Completed Specimen:  Urine, Clean Catch Updated:  08/26/18 1144     Urine Culture, Routine No growth        LIPID PANEL  Lab Results   Component Value Date    CHOL 159 08/23/2018     Lab Results   Component Value Date    HDL 35 (L) 08/23/2018     Lab Results   Component Value Date    LDLCALC 86.6 08/23/2018     Lab  Results   Component Value Date    TRIG 187 (H) 08/23/2018     Lab Results   Component Value Date    CHOLHDL 22.0 08/23/2018       ASSESSMENT/PLAN:   Sheryl Martines is a 63 y.o. female being followed by surgery for open appendectomy. The incision is well healing with staples in place. Continue daily dressing changes with xeroform, 4 x 4 and tape.     Case discussed and reviewed with Dr. GIANCARLO Melendrez     8/31/2018 D'Amico C Johnson, MD  9:09 AM

## 2018-08-31 NOTE — PROGRESS NOTES
Spoke with team about getting something else order along with zofran for nausea and something for gas pain. Notified patient. Will continue to monitor patient,

## 2018-08-31 NOTE — PT/OT/SLP PROGRESS
Occupational Therapy   Treatment    Name: Sheryl Martines  MRN: [id number]  Admitting Diagnosis:  Appendicitis with peritonitis  5 Days Post-Op    Recommendations:     Discharge Recommendations: nursing facility, skilled  Discharge Equipment Recommendations:  walker, rolling  Barriers to discharge:  Inaccessible home environment, Decreased caregiver support    Subjective     Communicated with: Katherine espinoza prior to session.  Pain/Comfort:  · Pain Rating 1: (c/o incisional pain during therex)  · Location - Side 1: Right  · Location - Orientation 1: lower  · Location 1: abdomen  · Pain Addressed 1: Reposition, Distraction, Cessation of Activity    Patients cultural, spiritual, Confucianist conflicts given the current situation: (none reported)    Objective:     Patient found with: bed alarm, oxygen, telemetry    General Precautions: Standard, fall, respiratory   Orthopedic Precautions:N/A   Braces: N/A     Bed Mobility:    · Patient completed Scooting/Bridging with stand by assistance  · Patient completed Supine to Sit with contact guard assistance and with side rail  · Patient completed Sit to Supine with contact guard assistance     Functional Mobility/Transfers:  · N/A    Activities of Daily Living:  · Grooming: set-p      Patient left HOB elevated with all lines intact, call button in reach, bed alarm on and RN notified    [facility] 6 Click:  [facility] Total Score: 16    Treatment & Education:  Patient with max encouragement to participate. Bed mob as noted above. Sat EOB for G/H tasks. Completed 10 bicep curls from EOB, but then c/o pain at R lower abd (incision site). Patient returned to  and able to scoot self to HOB. Patient declined to complete any further ax. Educated on importance of therex.  Education:    Assessment:   Patient required encouragement to participate, as she reports she just got back to bed. Patient will benefit from SNF upon D/C to maximize strength and endurance for ADL tasks.     Shreyl Martines is  a 63 y.o. female with a medical diagnosis of Appendicitis with peritonitis. Performance deficits affecting function are weakness, impaired endurance, impaired self care skills, impaired functional mobilty, impaired balance, decreased upper extremity function, decreased lower extremity function, pain, impaired skin, edema, impaired cardiopulmonary response to activity.      Rehab Prognosis:  Fair+; patient would benefit from acute skilled OT services to address these deficits and reach maximum level of function.       Plan:     Patient to be seen 5 x/week to address the above listed problems via self-care/home management, therapeutic activities, therapeutic exercises  · Plan of Care Expires: 09/27/18  · Plan of Care Reviewed with: patient, daughter    This Plan of care has been discussed with the patient who was involved in its development and understands and is in agreement with the identified goals and treatment plan    GOALS:   Multidisciplinary Problems     Occupational Therapy Goals        Problem: Occupational Therapy Goal    Goal Priority Disciplines Outcome Interventions   Occupational Therapy Goal     OT, PT/OT Ongoing (interventions implemented as appropriate)    Description:  Goals to be met by: 8/31/2018    Patient will increase functional independence with ADLs by performing:    Feeding with Modified [place].  UE Dressing with Set-up Assistance.  LE Dressing with Minimal Assistance.  Grooming while seated with Set-up Assistance. --MET 8/31  Toileting from bedside commode with Minimal Assistance for hygiene and clothing management.   Sitting at edge of bed x20 minutes with Supervision. --MET 8/31  Rolling to Bilateral with Modified [place].   Supine to sit with Modified [place].  Stand pivot transfers with Stand-by Assistance.  Step transfer with Supervision and Stand-by Assistance  Toilet transfer to bedside commode with Stand-by Assistance.  Increased functional strength to WFL for  DREW.  Upper extremity exercise program 10 reps per handout, with supervision.                       Time Tracking:     OT Date of Treatment: 08/31/18  OT Start Time: 1440  OT Stop Time: 1507  OT Total Time (min): 27 min    Billable Minutes:Self Care/Home Management 12  Therapeutic Activity 15    MANOJ Beebe  8/31/2018

## 2018-08-31 NOTE — PT/OT/SLP PROGRESS
Physical Therapy Treatment    Patient Name:  Sheryl Martines   MRN:  [id number]    Recommendations:     Discharge Recommendations:  nursing facility, skilled   Discharge Equipment Recommendations: (possibly RW)   Barriers to discharge: Inaccessible home and Decreased caregiver support    Assessment:     Sheryl Martines is a 63 y.o. female admitted with a medical diagnosis of Appendicitis with peritonitis.  She presents with the following impairments/functional limitations:  weakness, gait instability, impaired endurance, impaired functional mobilty, impaired self care skills, impaired balance, decreased upper extremity function, decreased safety awareness, pain, impaired skin, edema, impaired cardiopulmonary response to activity Pt feeling a little better today; pain~5/10 in lower abdomen; increased mobility and tolerance to activity; able to use RW to take steps from bed to chair; cont with POC.   .    Rehab Prognosis:  good; patient would benefit from acute skilled PT services to address these deficits and reach maximum level of function.      Recent Surgery: Procedure(s) (LRB):  APPENDECTOMY, LAPAROSCOPIC---CONVERTED TO OPEN APPENDECTOMY @0950 (N/A)  APPENDECTOMY (N/A) 4 Days Post-Op    Plan:     During this hospitalization, patient to be seen 6 x/week to address the above listed problems via gait training, therapeutic activities, therapeutic exercises  · Plan of Care Expires:  09/27/18   Plan of Care Reviewed with: patient    Subjective     Communicated with nurse prior to session.  Patient found supine with HOB elevated upon PT entry to room, agreeable to treatment.      Pain/Comfort:  Pain Rating 1: 5/10  Location - Side 1: Right  Location - Orientation 1: lower  Location 1: abdomen  Pain Addressed 1: Reposition, Distraction, Cessation of Activity  Pain Rating Post-Intervention 1: 8/10    Patients cultural, spiritual, Orthodoxy conflicts given the current situation: none reported    Objective:     Patient  found with: bed alarm, central line     General Precautions: Standard, fall   Orthopedic Precautions:N/A   Braces: N/A     Functional Mobility:  · Bed Mobility:     · Supine to Sit: stand by assistance and use of side rail  · Transfers:     · Sit to Stand:  contact guard assistance with rolling walker  · Bed to Chair: contact guard assistance with  rolling walker  using  Step Transfer  · Gait: Pt used RW to amb 3ft from bed to chair with CGA with very slow shelley, short step length, forward flexed trunk                                                                                                                                            AM-PAC 6 CLICK MOBILITY  Turning over in bed (including adjusting bedclothes, sheets and blankets)?: 3  Sitting down on and standing up from a chair with arms (e.g., wheelchair, bedside commode, etc.): 3  Moving from lying on back to sitting on the side of the bed?: 3  Moving to and from a bed to a chair (including a wheelchair)?: 3  Need to walk in hospital room?: 1  Climbing 3-5 steps with a railing?: 1  Basic Mobility Total Score: 14       Therapeutic Activities and Exercises:   Pt performed activities as described above; pursed lip breathing with activities; pt on 4L with O2 at 100%, 99%; pt used RW to take steps to bedside chair; performed seated BLE active ex 20 reps APs, 10 reps FAQs each with 3 second hold, buttocks squeezes.    Patient left up in chair with all lines intact, call button in reach, nurse notified and dtr present..    GOALS:   Multidisciplinary Problems     Physical Therapy Goals        Problem: Physical Therapy Goal    Goal Priority Disciplines Outcome Goal Variances Interventions   Physical Therapy Goal     PT, PT/OT Ongoing (interventions implemented as appropriate)     Description:  Goals to be met by: 9/27/2018     Patient will increase functional independence with mobility by performing:    Updated 8/27/2018: David completed this date  1. Supine to  sit with Modified [place]  2. Sit to supine with Modified [place]  3. Rolling to Left and Right with Modified [place].  4. Sit to stand transfer with Stand-by Assistance with AD  5. Bed to chair transfer with Stand-by Assistance with AD  6. Gait  x  feet with Stand-by Assistance using AD.   7. Ascend/Descend 8 inch curb step with Contact Guard Assistance and Minimal Assistance using AD  8. Lower extremity exercise program x10 reps with supervision              Problem: Physical Therapy Goal    Goal Priority Disciplines Outcome Goal Variances Interventions   Physical Therapy Goal     PT, PT/OT                      Time Tracking:     PT Received On: 08/30/18  PT Start Time: 1351     PT Stop Time: 1425  PT Total Time (min): 34 min with OT    Billable Minutes: Therapeutic Exercise 8 minutes    Treatment Type: Treatment  PT/PTA: PT     PTA Visit Number: 0     Rosa Richards, PT  08/30/2018

## 2018-08-31 NOTE — ASSESSMENT & PLAN NOTE
Ms Martines is post appendectomy, but there is still some drainage from the wound and she is on immunosuppressive medications which would mask signs/symptoms of peritonitis/abscess. Imaging is concerning for a RLQ fluid collection which could be hematoma/post surgical changes vs abscess and raises the question of whether source control has been achieved .    Recs  - continue treating with flagyl which would cover for bacteroides   - would not restart treatment for the Ecoli at this time since patient is afebrile with no leucocytosis but would have a low threshold to restart (ciprofloxacin) if any decline   - would get surgical opinion on significance of the imaging findings and whether aspiration is appropriate

## 2018-08-31 NOTE — PROGRESS NOTES
met with patient and daughters regarding discharge plans.  Daughter, Citlaly reported that the home health agency ([facility]) has made phone contact with her and confirmed her address ([address] [place] [id number]).  Patient and daughters informed about the important message from Medicare.  They were without questions and concerns at this time.     [facility] has accepted patient for services.  They were informed that patient might discharge this weekend.

## 2018-08-31 NOTE — CONSULTS
Inpatient Interventional Radiology Consult Note, Inpatient      Chief Complaint/Reason for Consult: Fluid collection    History of Present Illness:  Sheryl Martines is a 63 y.o. female s/p appendectomy for perf appendicitis. Fluid collection noted on CT. IR consulted for eval and possible drainage.    Admission H&P reviewed.    Past Medical History:   Diagnosis Date    Asthma     Closed compression fracture of fourth lumbar vertebra     COPD (chronic obstructive pulmonary disease)     Coronary artery disease     Diabetes mellitus     Glaucoma     High cholesterol     Hypertension     Iritis     Pulmonary embolus     Stroke     rt sided weakness.     Past Surgical History:   Procedure Laterality Date    ABDOMINAL SURGERY      BACK SURGERY      stimulator    CATARACT EXTRACTION      HYSTERECTOMY         Allergies:   Review of patient's allergies indicates:   Allergen Reactions    Ace inhibitors Swelling    Corticosteroids (glucocorticoids)     Hydralazine analogues     Tetracyclines Swelling    Travatan (with benzalkonium) [travoprost (benzalkonium)]        Home Meds:   Prior to Admission medications    Medication Sig Start Date End Date Taking? Authorizing Provider   pyridoxine, vitamin B6, (VITAMIN B-6) 50 MG Tab Take 50 mg by mouth once daily.   Yes Historical Provider, MD   traMADol (ULTRAM-ER) 100 MG Tb24 Take 50 mg by mouth daily as needed.   Yes Historical Provider, MD   albuterol (ACCUNEB) 0.63 mg/3 mL Nebu Take 0.83 mg by nebulization every 6 (six) hours as needed. Rescue     Historical Provider, MD   baclofen (LIORESAL) 10 MG tablet Take 10 mg by mouth 3 (three) times daily.    Historical Provider, MD   clopidogrel (PLAVIX) 75 mg tablet Take 75 mg by mouth once daily.    Historical Provider, MD   diltiaZEM (CARDIZEM CD) 180 MG 24 hr capsule Take 180 mg by mouth once daily.    Historical Provider, MD   DULoxetine (CYMBALTA) 60 MG capsule Take 60 mg by mouth once daily.    Historical  Provider, MD   fluticasone-salmeterol 500-50 mcg/dose (ADVAIR DISKUS) 500-50 mcg/dose DsDv diskus inhaler Inhale 1 puff into the lungs 2 (two) times daily. Controller    Historical Provider, MD   furosemide (LASIX) 40 MG tablet Take 40 mg by mouth once daily.     Historical Provider, MD   gabapentin (NEURONTIN) 300 MG capsule Take 300 mg by mouth 3 (three) times daily.    Historical Provider, MD   insulin detemir U-100 (LEVEMIR) 100 unit/mL injection Inject 20 Units into the skin 2 (two) times daily.     Historical Provider, MD   insulin lispro (HUMALOG PEN SUBQ) Inject into the skin.    Historical Provider, MD   lansoprazole (PREVACID) 30 MG capsule Take 30 mg by mouth once daily.    Historical Provider, MD   losartan potassium (COZAAR ORAL) Take 100 mg by mouth.     Historical Provider, MD   prazosin (MINIPRESS) 5 MG capsule Take 5 mg by mouth 2 (two) times daily.     Historical Provider, MD   predniSONE (DELTASONE) 10 MG tablet Take 10 mg by mouth 2 (two) times daily.     Historical Provider, MD   simvastatin (ZOCOR) 40 MG tablet Take 40 mg by mouth every evening.    Historical Provider, MD   sitagliptin phosphate (JANUVIA ORAL) Take by mouth.    Historical Provider, MD   theophylline (THEODUR) 200 MG 12 hr tablet Take 200 mg by mouth once daily.     Historical Provider, MD   theophylline anhydrous (UNIPHYL ORAL) Take by mouth.    Historical Provider, MD     Scheduled Meds:    albuterol-ipratropium  3 mL Nebulization Q4H WAKE    aspirin  81 mg Oral Daily    atorvastatin  40 mg Oral Daily    clopidogrel  75 mg Oral Daily    diclofenac sodium   Topical (Top) Daily    diltiaZEM  180 mg Oral Daily    enoxaparin  30 mg Subcutaneous Daily    famotidine  20 mg Oral BID    furosemide  40 mg Oral BID    lidocaine  1 patch Transdermal Q24H    losartan  100 mg Oral Daily    magnesium sulfate IVPB  2 g Intravenous Once    metroNIDAZOLE  500 mg Oral Q8H    prazosin  5 mg Oral BID    predniSONE  20 mg Per OG  tube BID    sodium chloride 0.9%  3 mL Intravenous Q8H    theophylline  200 mg Oral Daily    tobramycin-dexamethasone 0.3-0.1%  1 drop Both Eyes Q4H While awake    vancomycin  500 mg Oral Q6H     Continuous Infusions:   PRN Meds:acetaminophen, albuterol, artificial tears, dextrose 50%, diphenhydrAMINE, glucagon (human recombinant), glucose, glucose, HYDROmorphone, insulin aspart U-100, labetalol, lidocaine-prilocaine, loperamide, ondansetron, traMADol    Anticoagulation/Antiplatelet: ASA and Lovenox    Review of Systems:   As documented in admission H&P.    Physical Exam:  Temp: 98.7 °F (37.1 °C) (08/31/18 1601)  Pulse: 61 (08/31/18 1610)  Resp: 17 (08/31/18 1610)  BP: (!) 191/89 (08/31/18 1601)  SpO2: 100 % (08/31/18 1610)     PHYSICAL EXAM:  Temp:  [97.8 °F (36.6 °C)-98.7 °F (37.1 °C)] 98.5 °F (36.9 °C)  Pulse:  [54-85] 76  Resp:  [16-20] 16  SpO2:  [100 %] 100 %  BP: (165-210)/(77-93) 166/82    Intake/Output Summary (Last 24 hours) at 9/1/2018 0820  Last data filed at 9/1/2018 0653  Gross per 24 hour   Intake --   Output 900 ml   Net -900 ml     General: WNWD, NAD  HEENT: Normocephalic, sclera anicteric, oropharynx clear  Heart: RRR  Lungs: Symmetric excursions, breathing unlabored  Abd: ND  Extremities: MAEW  Neuro: Gross nonfocal    Labs:  No results for input(s): INR in the last 168 hours.    Invalid input(s):  PT,  PTT    Recent Labs   Lab  08/31/18   0450   WBC  10.67   HGB  7.9*   HCT  26.9*   MCV  81*   PLT  431*      Recent Labs   Lab  08/31/18   0450   GLU  213*   NA  137   K  3.8   CL  94*   CO2  36*   BUN  14   CREATININE  1.0   CALCIUM  9.3   MG  1.6   ALT  18   AST  27   ALBUMIN  2.1*   BILITOT  0.3       Imaging:  CT AP 8/31/18 reviewed.    Assessment/Plan:   Small hyperattenuating fluid collection without rim enhancement in the right posterior pelvis. May contain appendicolith. Appears amenable to drainage catheter placement from posterior approach. In the absence of signs of infection (fever,  white count), it may represent a hematoma. Recommend conservative management with low threshold for drainage if clinical signs of infection become apparent. Please re-consult IR if this occurs and drainage needed.    D/w Dr. Jovel by telephone.      Arsenio Davies MD  [facility]  Pager [phone]

## 2018-08-31 NOTE — PROGRESS NOTES
" [facility]  Adult Nutrition  Progress Note    SUMMARY       Recommendations    Recommendation/Intervention:   1. Add 2000 ADA restrictions to diet.   2. Add Boost Glucose Control bid.   3. Consider appetite stimulant.   4. Continue to encourage good intake at meals    Goals:   Pt will conume at least 50% intake at meals  Nutrition Goal Status: progressing towards goal  Communication of RD Recs: reviewed with RN(Katherine)    Reason for Assessment  Reason for Assessment: RD follow-up  Diagnosis: (AMS)  Relevant Medical History: CAD, DM, high cholesterol, stroke, pulmonary embolus  Interdisciplinary Rounds: attended  General Information Comments: Pt on [place] Soft diet. Pt with poor intake at meals per RN.  Nutrition Discharge Planning: pt to d/c on [place] ADA diet    Nutrition Risk Screen  Nutrition Risk Screen: no indicators present    Nutrition/Diet History  Food Preferences: unable to assess  Do you have any cultural, spiritual, Gnosticism conflicts, given your current situation?: none reported  Factors Affecting Nutritional Intake: (just extubated)    Anthrprometrics  Temp: 98 °F (36.7 °C)  Height Method: Stated  Height: 5' 1" (154.9 cm)  Height (inches): 61 in  Weight Method: Bed Scale  Weight: 88 kg (194 lb 0.1 oz)  Weight (lb): 194.01 lb  Ideal Body Weight (IBW), Female: 105 lb  % Ideal Body Weight, Female (lb): 169.89 lb  BMI (Calculated): 33.8  BMI Grade: 30 - 34.9- obesity - grade I     Lab/Procedures/Meds  Pertinent Labs Reviewed: reviewed  Pertinent Labs Comments: Glu 149H, Phos 2,4L, Alb 2.1L  Pertinent Medications Reviewed: reviewed  Pertinent Medications Comments: aspirin, Lasix    Physical Findings/Assessment  Overall Physical Appearance: overweight  Tubes: (none)  Oral/Mouth Cavity: (unable to assess)  Skin: (Finn 15-abd incision)    Estimated/Assessed Needs  Weight Used For Calorie Calculations: 88 kg (194 lb 0.1 oz)  Energy Calorie Requirements (kcal): 1784  Energy Need Method: " MylesSt Hardin(x1.3)  Protein Requirements: 70g (0.8g/kg)  Weight Used For Protein Calculations: 88 kg (194 lb 0.1 oz)  Fluid Need Method: RDA Method  RDA Method (mL): 1784  CHO Requirement: 175g    Nutrition Prescription Ordered  Current Diet Order: [place] Soft    Evaluation of Received Nutrient/Fluid Intake  Energy Calories Required: not meeting needs  Protein Required: not meeting needs  Fluid Required: not meeting needs  Comments: LBM 8/28  % Intake of Estimated Energy Needs: 0 - 25 %  % Meal Intake: 0 - 25 %    Nutrition Risk  Level of Risk/Frequency of Follow-up: (2xweekly)     Assessment and Plan    Altered mental status    Contributing Nutrition Diagnosis  Inadequate energy intake    Related to (etiology):   dx    Signs and Symptoms (as evidenced by):   NPO     Interventions/Recommendations (treatment strategy):  See recs    Nutrition Diagnosis Status:   Improving             Monitor and Evaluation  Food and Nutrient Intake: food and beverage intake  Food and Nutrient Adminstration: diet order  Physical Activity and Function: nutrition-related ADLs and IADLs  Anthropometric Measurements: weight  Biochemical Data, Medical Tests and Procedures: electrolyte and renal panel  Nutrition-Focused Physical Findings: overall appearance     Nutrition Follow-Up  RD Follow-up?: Yes

## 2018-08-31 NOTE — PLAN OF CARE
Problem: Nutrition, Imbalanced: Inadequate Oral Intake (Adult)  Goal: Improved Oral Intake  Patient will demonstrate the desired outcomes by discharge/transition of care.  Outcome: Ongoing (interventions implemented as appropriate)  Recommendation/Intervention:   1. Add 2000 ADA restrictions to diet.   2. Add Boost Glucose Control bid.   3. Consider appetite stimulant.   4. Continue to encourage good intake at meals    Goals:   Pt will conume at least 50% intake at meals  Nutrition Goal Status: progressing towards goal  Communication of RD Recs: reviewed with RN(Katherine)

## 2018-08-31 NOTE — CONSULTS
[facility]  Infectious Disease  Consult Note    Patient Name: Sheryl Martines  MRN: [id number]  Admission Date: 8/22/2018  Hospital Length of Stay: 7 days  Attending Physician: Cassy Wick MD  Primary Care Provider: Primary Doctor No     Isolation Status: Special Contact    Patient information was obtained from patient, relative(s) and past medical records.      Inpatient consult to Infectious Diseases  Consult performed by: Virginia Gutierrez MD  Consult ordered by: Leta Jovel MD  Reason for consult: Clostridium difficile, appendicitis         Assessment/Plan:     * Appendicitis with peritonitis    Ms Martines appears to have achieved source control of this, although there is still some drainage from the wound and she is on immunosuppressive medications which would mask signs/symptoms of peritonitis/abscess    Recs  - continue treating with flagyl which would cover for the bacteroides   - would re image to confirm no fluid collection   - can d/c cipro        Clostridium difficile infection    Her test results, diarrhoea and leucocytosis (may be multifactorial) are concerning for cdiff infection. Would treat as non severe cdiff, first episode     Recs  - po vancomycin for 10 days            Thank you for your consult. I will follow-up with patient. Please contact us if you have any additional questions.    Virginia Gutierrez MD  Infectious Disease  [facility]    Subjective:     Principal Problem: Appendicitis with peritonitis    HPI: Sheryl Martines is a 63 y.o.  woman with a history of HTN, DM2, CAD, asthma, pontine stroke in 2012 with residual R sided weakness, osteoporosis. She presented to the ED on 8/22 with severe midline lower back pain s/p fall the day before. Also noted to have AMS, pt received imaging which was concerning for appendicitis. Further imaging revealed the extent of the appendicitis. She was started on zosyn and Vanc on 8/24, had appendectomy  on 8/26 op note further states the peritonitis was localized, appendix successfully removed, and region thoroughly irrigated with abx solution. Since surgery pt reports onset of intermittent nausea, decreased appetite, and diarrhea (12-16 episodes yesterday). Wound cultures from the procedure have grown anaerobes bacteroides fragilis, bacteroides thetaiotaomicron, and eggerthella lenta; aerobes E. Coli (cipro sensitive) and enterococcus raffinosus (vanc/genta sensitive). Primary team has been treating with cipro, flagyl, and vancomycin. On 8/28 pt tested positive for clostridium difficile. Pt denies any previous episodes of c.diff and cannot remember the last time she required abx.  Pt denies any recent sick contacts. She is retired and lives with her daughter with no pets. She has never been hospitalized for an infection in the past, and reports most recent hospitalization being last year for asthma exacerbation. ID was consulted for abx regimen recs.     Of note she is on chronic prednisone 20mg po qday for asthma        Past Medical History:   Diagnosis Date    Asthma     Closed compression fracture of fourth lumbar vertebra     COPD (chronic obstructive pulmonary disease)     Coronary artery disease     Diabetes mellitus     Glaucoma     High cholesterol     Hypertension     Iritis     Pulmonary embolus     Stroke     rt sided weakness.       Past Surgical History:   Procedure Laterality Date    ABDOMINAL SURGERY      BACK SURGERY      stimulator    CATARACT EXTRACTION      HYSTERECTOMY         Review of patient's allergies indicates:   Allergen Reactions    Ace inhibitors Swelling    Corticosteroids (glucocorticoids)     Hydralazine analogues     Tetracyclines Swelling    Travatan (with benzalkonium) [travoprost (benzalkonium)]        Medications:  Medications Prior to Admission   Medication Sig    pyridoxine, vitamin B6, (VITAMIN B-6) 50 MG Tab Take 50 mg by mouth once daily.    traMADol  (ULTRAM-ER) 100 MG Tb24 Take 50 mg by mouth daily as needed.    albuterol (ACCUNEB) 0.63 mg/3 mL Nebu Take 0.83 mg by nebulization every 6 (six) hours as needed. Rescue     baclofen (LIORESAL) 10 MG tablet Take 10 mg by mouth 3 (three) times daily.    clopidogrel (PLAVIX) 75 mg tablet Take 75 mg by mouth once daily.    diltiaZEM (CARDIZEM CD) 180 MG 24 hr capsule Take 180 mg by mouth once daily.    DULoxetine (CYMBALTA) 60 MG capsule Take 60 mg by mouth once daily.    fluticasone-salmeterol 500-50 mcg/dose (ADVAIR DISKUS) 500-50 mcg/dose DsDv diskus inhaler Inhale 1 puff into the lungs 2 (two) times daily. Controller    furosemide (LASIX) 40 MG tablet Take 40 mg by mouth once daily.     gabapentin (NEURONTIN) 300 MG capsule Take 300 mg by mouth 3 (three) times daily.    insulin detemir U-100 (LEVEMIR) 100 unit/mL injection Inject 20 Units into the skin 2 (two) times daily.     insulin lispro (HUMALOG PEN SUBQ) Inject into the skin.    lansoprazole (PREVACID) 30 MG capsule Take 30 mg by mouth once daily.    losartan potassium (COZAAR ORAL) Take 100 mg by mouth.     prazosin (MINIPRESS) 5 MG capsule Take 5 mg by mouth 2 (two) times daily.     predniSONE (DELTASONE) 10 MG tablet Take 10 mg by mouth 2 (two) times daily.     simvastatin (ZOCOR) 40 MG tablet Take 40 mg by mouth every evening.    sitagliptin phosphate (JANUVIA ORAL) Take by mouth.    theophylline (THEODUR) 200 MG 12 hr tablet Take 200 mg by mouth once daily.     theophylline anhydrous (UNIPHYL ORAL) Take by mouth.     Antibiotics (From admission, onward)    Start     Stop Route Frequency Ordered    08/30/18 2200  metroNIDAZOLE tablet 500 mg      09/02 2159 Oral Every 8 hours 08/30/18 1655    08/30/18 1800  vancomycin 250mg / 10ml oral suspension 500 mg      -- Oral Every 6 hours 08/30/18 1640    08/24/18 1445  tobramycin-dexamethasone 0.3-0.1% ophthalmic suspension 1 drop      -- Both Eyes Every 4 hours while awake 08/24/18 1335         Antifungals (From admission, onward)    None        Antivirals (From admission, onward)    None           Immunization History   Administered Date(s) Administered    PPD Test 08/28/2018       Family History     None        Social History     Socioeconomic History    Marital status:      Spouse name: None    Number of children: None    Years of education: None    Highest education level: None   Social Needs    Financial resource strain: None    Food insecurity - worry: None    Food insecurity - inability: None    Transportation needs - medical: None    Transportation needs - non-medical: None   Occupational History    None   Tobacco Use    Smoking status: Never Smoker   Substance and Sexual Activity    Alcohol use: No     Frequency: Never    Drug use: No    Sexual activity: No     Partners: Male   Other Topics Concern    None   Social History Narrative    None     Review of Systems   Constitutional: Negative.    HENT: Negative.    Respiratory: Positive for wheezing. Negative for cough.    Cardiovascular: Negative for chest pain.   Gastrointestinal: Negative for constipation, diarrhea, nausea, rectal pain and vomiting.   Skin: Positive for wound. Negative for color change and rash.     Objective:     Vital Signs (Most Recent):  Temp: 99.2 °F (37.3 °C) (08/30/18 1946)  Pulse: 72 (08/30/18 2000)  Resp: 16 (08/30/18 1949)  BP: (!) 167/78 (08/30/18 1946)  SpO2: 97 % (08/30/18 1949) Vital Signs (24h Range):  Temp:  [96.9 °F (36.1 °C)-99.2 °F (37.3 °C)] 99.2 °F (37.3 °C)  Pulse:  [66-85] 72  Resp:  [14-24] 16  SpO2:  [97 %-100 %] 97 %  BP: (144-184)/(70-84) 167/78     Weight: 88 kg (194 lb 0.1 oz)  Body mass index is 36.66 kg/m².    Estimated Creatinine Clearance: 58.1 mL/min (based on SCr of 1 mg/dL).    Physical Exam   Constitutional: She is oriented to person, place, and time. She appears well-developed and well-nourished.   Neck: No JVD present.   Cardiovascular: Normal rate, regular rhythm  and normal heart sounds. Exam reveals no friction rub.   No murmur heard.  Abdominal: Soft. Bowel sounds are normal. She exhibits distension. There is tenderness. There is no rebound and no guarding.   Wound site, some serous drainage   Neurological: She is alert and oriented to person, place, and time.   Psychiatric: She has a normal mood and affect. Her behavior is normal.       Significant Labs:   BMP:   Recent Labs   Lab  08/30/18   0445   GLU  149*   NA  140   K  3.8   CL  100   CO2  33*   BUN  15   CREATININE  1.0   CALCIUM  8.7   MG  1.7     CBC:   Recent Labs   Lab  08/29/18   0451  08/30/18   0445   WBC  23.90*  16.09*   HGB  7.9*  7.5*   HCT  26.1*  26.3*   PLT  300  384*     CMP:   Recent Labs   Lab  08/29/18   0451  08/30/18   0445   NA  141  140   K  3.7  3.8   CL  102  100   CO2  31*  33*   GLU  130*  149*   BUN  15  15   CREATININE  1.1  1.0   CALCIUM  8.4*  8.7   PROT  6.1  6.1   ALBUMIN  2.1*  2.1*   BILITOT  0.3  0.3   ALKPHOS  122  115   AST  33  22   ALT  23  18   ANIONGAP  8  7*   EGFRNONAA  54*  >60       Significant Imaging: I have reviewed all pertinent imaging results/findings within the past 24 hours.

## 2018-08-31 NOTE — SUBJECTIVE & OBJECTIVE
Past Medical History:   Diagnosis Date    Asthma     Closed compression fracture of fourth lumbar vertebra     COPD (chronic obstructive pulmonary disease)     Coronary artery disease     Diabetes mellitus     Glaucoma     High cholesterol     Hypertension     Iritis     Pulmonary embolus     Stroke     rt sided weakness.       Past Surgical History:   Procedure Laterality Date    ABDOMINAL SURGERY      BACK SURGERY      stimulator    CATARACT EXTRACTION      HYSTERECTOMY         Review of patient's allergies indicates:   Allergen Reactions    Ace inhibitors Swelling    Corticosteroids (glucocorticoids)     Hydralazine analogues     Tetracyclines Swelling    Travatan (with benzalkonium) [travoprost (benzalkonium)]        Medications:  Medications Prior to Admission   Medication Sig    pyridoxine, vitamin B6, (VITAMIN B-6) 50 MG Tab Take 50 mg by mouth once daily.    traMADol (ULTRAM-ER) 100 MG Tb24 Take 50 mg by mouth daily as needed.    albuterol (ACCUNEB) 0.63 mg/3 mL Nebu Take 0.83 mg by nebulization every 6 (six) hours as needed. Rescue     baclofen (LIORESAL) 10 MG tablet Take 10 mg by mouth 3 (three) times daily.    clopidogrel (PLAVIX) 75 mg tablet Take 75 mg by mouth once daily.    diltiaZEM (CARDIZEM CD) 180 MG 24 hr capsule Take 180 mg by mouth once daily.    DULoxetine (CYMBALTA) 60 MG capsule Take 60 mg by mouth once daily.    fluticasone-salmeterol 500-50 mcg/dose (ADVAIR DISKUS) 500-50 mcg/dose DsDv diskus inhaler Inhale 1 puff into the lungs 2 (two) times daily. Controller    furosemide (LASIX) 40 MG tablet Take 40 mg by mouth once daily.     gabapentin (NEURONTIN) 300 MG capsule Take 300 mg by mouth 3 (three) times daily.    insulin detemir U-100 (LEVEMIR) 100 unit/mL injection Inject 20 Units into the skin 2 (two) times daily.     insulin lispro (HUMALOG PEN SUBQ) Inject into the skin.    lansoprazole (PREVACID) 30 MG capsule Take 30 mg by mouth once daily.     losartan potassium (COZAAR ORAL) Take 100 mg by mouth.     prazosin (MINIPRESS) 5 MG capsule Take 5 mg by mouth 2 (two) times daily.     predniSONE (DELTASONE) 10 MG tablet Take 10 mg by mouth 2 (two) times daily.     simvastatin (ZOCOR) 40 MG tablet Take 40 mg by mouth every evening.    sitagliptin phosphate (JANUVIA ORAL) Take by mouth.    theophylline (THEODUR) 200 MG 12 hr tablet Take 200 mg by mouth once daily.     theophylline anhydrous (UNIPHYL ORAL) Take by mouth.     Antibiotics (From admission, onward)    Start     Stop Route Frequency Ordered    08/30/18 2200  metroNIDAZOLE tablet 500 mg      09/02 2159 Oral Every 8 hours 08/30/18 1655    08/30/18 1800  vancomycin 250mg / 10ml oral suspension 500 mg      -- Oral Every 6 hours 08/30/18 1640    08/24/18 1445  tobramycin-dexamethasone 0.3-0.1% ophthalmic suspension 1 drop      -- Both Eyes Every 4 hours while awake 08/24/18 1335        Antifungals (From admission, onward)    None        Antivirals (From admission, onward)    None           Immunization History   Administered Date(s) Administered    PPD Test 08/28/2018       Family History     None        Social History     Socioeconomic History    Marital status:      Spouse name: None    Number of children: None    Years of education: None    Highest education level: None   Social Needs    Financial resource strain: None    Food insecurity - worry: None    Food insecurity - inability: None    Transportation needs - medical: None    Transportation needs - non-medical: None   Occupational History    None   Tobacco Use    Smoking status: Never Smoker   Substance and Sexual Activity    Alcohol use: No     Frequency: Never    Drug use: No    Sexual activity: No     Partners: Male   Other Topics Concern    None   Social History Narrative    None     Review of Systems   Constitutional: Negative.    HENT: Negative.    Respiratory: Positive for wheezing. Negative for cough.     Cardiovascular: Negative for chest pain.   Gastrointestinal: Negative for constipation, diarrhea, nausea, rectal pain and vomiting.   Skin: Positive for wound. Negative for color change and rash.     Objective:     Vital Signs (Most Recent):  Temp: 99.2 °F (37.3 °C) (08/30/18 1946)  Pulse: 72 (08/30/18 2000)  Resp: 16 (08/30/18 1949)  BP: (!) 167/78 (08/30/18 1946)  SpO2: 97 % (08/30/18 1949) Vital Signs (24h Range):  Temp:  [96.9 °F (36.1 °C)-99.2 °F (37.3 °C)] 99.2 °F (37.3 °C)  Pulse:  [66-85] 72  Resp:  [14-24] 16  SpO2:  [97 %-100 %] 97 %  BP: (144-184)/(70-84) 167/78     Weight: 88 kg (194 lb 0.1 oz)  Body mass index is 36.66 kg/m².    Estimated Creatinine Clearance: 58.1 mL/min (based on SCr of 1 mg/dL).    Physical Exam   Constitutional: She is oriented to person, place, and time. She appears well-developed and well-nourished.   Neck: No JVD present.   Cardiovascular: Normal rate, regular rhythm and normal heart sounds. Exam reveals no friction rub.   No murmur heard.  Abdominal: Soft. Bowel sounds are normal. She exhibits distension. There is tenderness. There is no rebound and no guarding.   Wound site, some serous drainage   Neurological: She is alert and oriented to person, place, and time.   Psychiatric: She has a normal mood and affect. Her behavior is normal.       Significant Labs:   BMP:   Recent Labs   Lab  08/30/18 0445   GLU  149*   NA  140   K  3.8   CL  100   CO2  33*   BUN  15   CREATININE  1.0   CALCIUM  8.7   MG  1.7     CBC:   Recent Labs   Lab  08/29/18 0451 08/30/18 0445   WBC  23.90*  16.09*   HGB  7.9*  7.5*   HCT  26.1*  26.3*   PLT  300  384*     CMP:   Recent Labs   Lab  08/29/18 0451 08/30/18 0445   NA  141  140   K  3.7  3.8   CL  102  100   CO2  31*  33*   GLU  130*  149*   BUN  15  15   CREATININE  1.1  1.0   CALCIUM  8.4*  8.7   PROT  6.1  6.1   ALBUMIN  2.1*  2.1*   BILITOT  0.3  0.3   ALKPHOS  122  115   AST  33  22   ALT  23  18   ANIONGAP  8  7*   EGFRNONAA   54*  >60       Significant Imaging: I have reviewed all pertinent imaging results/findings within the past 24 hours.

## 2018-08-31 NOTE — ASSESSMENT & PLAN NOTE
Ms Martines appears to have achieved source control of this, although there is still some drainage from the wound and she is on immunosuppressive medications which would mask signs/symptoms of peritonitis/abscess    Recs  - continue treating with flagyl which would cover for the bacteroides   - would re image to confirm no fluid collection   - can d/c cipro

## 2018-08-31 NOTE — PROGRESS NOTES
PGY-1 Progress Note [facility]  Follow up for: open appendectomy    Chief Complaint   Patient presents with    Altered Mental Status     To ER with EMS stating that the family called for altered mental status but was awake and alert when they arrived.  pt with slurred speech and has slept alot today, taking pain medication for an injury one week ago.     Hospital Stay Day 8    Subjective: Afebrile VSS, good uop, tolerating diet, reports decreased appetite. Reports SOB at baseline. She uses 2L NC at home and is currently being weaned back to this level. Reports her current pain regimen is controlling her surgical site pain.  Denies any CP, N/V/D, headaches, fever or chills.     Scheduled Meds:   albuterol-ipratropium  3 mL Nebulization Q4H WAKE    aspirin  81 mg Oral Daily    atorvastatin  40 mg Oral Daily    clopidogrel  75 mg Oral Daily    diclofenac sodium   Topical (Top) Daily    diltiaZEM  180 mg Oral Daily    enoxaparin  30 mg Subcutaneous Daily    famotidine  20 mg Oral BID    furosemide  40 mg Oral BID    lidocaine  1 patch Transdermal Q24H    losartan  100 mg Oral Daily    magnesium sulfate IVPB  2 g Intravenous Once    metroNIDAZOLE  500 mg Oral Q8H    predniSONE  20 mg Per OG tube BID    sodium chloride 0.9%  3 mL Intravenous Q8H    theophylline  200 mg Oral Daily    tobramycin-dexamethasone 0.3-0.1%  1 drop Both Eyes Q4H While awake    vancomycin  500 mg Oral Q6H     Continuous Infusions:  PRN Meds:acetaminophen, albuterol, artificial tears, dextrose 50%, diphenhydrAMINE, glucagon (human recombinant), glucose, glucose, HYDROmorphone, insulin aspart U-100, labetalol, lidocaine-prilocaine, loperamide, ondansetron, traMADol    Review of patient's allergies indicates:   Allergen Reactions    Ace inhibitors Swelling    Corticosteroids (glucocorticoids)     Hydralazine analogues     Tetracyclines Swelling    Travatan (with benzalkonium) [travoprost (benzalkonium)]        Objectives:      Vitals(Most Recent)      BP  Min: 144/70  Max: 173/80  Temp  Av.8 °F (37.1 °C)  Min: 98.1 °F (36.7 °C)  Max: 99.2 °F (37.3 °C)  Pulse  Av.3  Min: 65  Max: 77  Resp  Av.3  Min: 16  Max: 18  SpO2  Av %  Min: 96 %  Max: 100 %             Vitals([id number])  Temp:  [98.1 °F (36.7 °C)-99.2 °F (37.3 °C)]   Pulse:  [65-77]   Resp:  [16-18]   BP: (144-173)/(70-80)   SpO2:  [96 %-100 %]     I & O([id number])    Intake/Output Summary (Last 24 hours) at 2018 0737  Last data filed at 2018 0602  Gross per 24 hour   Intake --   Output 1150 ml   Net -1150 ml     General: AAOx3. NAD. Resting comfortably in bed  HEENT: NCAT. PERRLA. EOMI.   Neck: Supple. No JVD. No LAD.    CV: RRR, no murmur   Chest: Minimal wheezing intermittently at lung bases.     Abd: Soft. ND, TTP at surgical site.  No rebound or guarding.   Ext: no edema appreciated  Skin: Intact. No rash. No lesions.   Neuro: CN II-XII intact. No focal deficit. Strength 5/5 throughout. Sensation intact.   Psych: Good judgement and reason. NL affect. No abnormal behaviors noted    LABS  CBC  Recent Labs   Lab  18   04518   045   WBC  23.90*  16.09*  10.67   RBC  3.23*  3.14*  3.31*   HGB  7.9*  7.5*  7.9*   HCT  26.1*  26.3*  26.9*   PLT  300  384*  431*   MCV  81*  84  81*   MCH  24.5*  23.9*  23.9*   MCHC  30.3*  28.5*  29.4*     BMP  Recent Labs   Lab  18   0451  18   0445  18   045   NA  141  140  137   K  3.7  3.8  3.8   CO2  31*  33*  36*   CL  102  100  94*   BUN  15  15  14   CREATININE  1.1  1.0  1.0   GLU  130*  149*  213*       POCT-Glucose  POCT Glucose   Date Value Ref Range Status   2018 241 (H) 70 - 110 mg/dL Final   2018 285 (H) 70 - 110 mg/dL Final   2018 238 (H) 70 - 110 mg/dL Final   2018 275 (H) 70 - 110 mg/dL Final   2018 176 (H) 70 - 110 mg/dL Final   2018 162 (H) 70 - 110 mg/dL Final   2018 182 (H) 70 - 110 mg/dL Final   2018  166 (H) 70 - 110 mg/dL Final   08/29/2018 162 (H) 70 - 110 mg/dL Final   08/28/2018 149 (H) 70 - 110 mg/dL Final   08/28/2018 171 (H) 70 - 110 mg/dL Final   08/28/2018 168 (H) 70 - 110 mg/dL Final       Recent Labs   Lab  08/29/18   0451  08/30/18   0445  08/31/18   0450   CALCIUM  8.4*  8.7  9.3   MG  1.8  1.7  1.6   PHOS  2.3*  2.4*  1.9*     LFT  Recent Labs   Lab  08/29/18   0451  08/30/18   0445  08/31/18   0450   PROT  6.1  6.1  6.1   ALBUMIN  2.1*  2.1*  2.1*   BILITOT  0.3  0.3  0.3   AST  33  22  27   ALKPHOS  122  115  112   ALT  23  18  18   CE  Recent Labs   Lab  08/25/18   0624   TROPONINI  0.098*     BNP  Recent Labs   Lab  08/26/18   1550   BNP  45     LAST HbA1c  Lab Results   Component Value Date    HGBA1C 8.1 (H) 08/23/2018     Imaging- no new imaging      Assessment/Plan:   63 y.o. 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  Patient went to OR Sunday AM for appendectomy.        Appendiceal Abscess  - Blood cultures x 2 - Negative   - POD#5 for open appendectomy   - Abx- flagyl   - Surgical site dressing changed yesterday by surgery  - Appendix culture- positive for E coli sensitive to cipro. Received 2 days of IV cipro.   - ID consult: continue oral flagyl for bacteroides infection. CTA to confirm no fluid collection in abdomen.     C. Diff Infection  - Positive c diff toxin by PCR and c diff EIA  - Per ID: continue PO vancomycin for 10 days    NIKHIL  - BUN/Crea of 26/2.5 on admit  - improved to 16/1.3 on 8/25  - BUN/Crea of 15/1.4 this AM   - continue to monitor     CVA    CT head is negative for acute hemorrhagic event  Anti-platelet therapy: ASA 81/Plavix 75mg   Atorvastatin 40mg PO daily  Q4 nuero checks  PT/OT/ST  Fall precautions  Drug screen pos for opiates      Carotid Doppler ordered- Allowing for limitation, 50-69% right ICA stenosis with less than 50% left ICA stenosis.  Antegrade vertebral  arteries bilaterally.     2D echo with bubble study- 1 - Concentric remodeling.     2 - No wall motion abnormalities.     3 - Normal left ventricular systolic function (EF 60-65%).     4 - Impaired LV relaxation, normal LAP (grade 1 diastolic dysfunction).     5 - Normal right ventricular systolic function .     6 - The estimated PA systolic pressure is 14 mmHg.     7 - No evidence of intracardiac shunt.      EEG performed- encephalopathy      CTA head and neck Unremarkable and without evidence for proximal significant stenosis or occlusion.     HTN  Clonidine .1mg PO BID  Lasix 40mg PO BID  Diltiazem 180mg ER PO daily      DM2  -Patient on moderate dose SSI     CAD  - Continue ASA and plavix and Atorvastatin      Elevated troponin- resolving   - 0.273-->0.267--> 0.233--> .098      PT/OT: ordered and following  Code: full  Diet: full liquid, advance as tolerated   Ppx: dvt: lovenox   Dispo: pending placement     Case discussed with staff    Mary Kate Hernandes MD  [facility] PGY-1  08/31/2018

## 2018-08-31 NOTE — PROGRESS NOTES
[facility]  Infectious Disease  Progress Note    Patient Name: Sheryl Martines  MRN: [id number]  Admission Date: 8/22/2018  Length of Stay: 8 days  Attending Physician: Cassy Wick MD  Primary Care Provider: Primary Doctor No    Isolation Status: Special Contact  Assessment/Plan:      * Appendicitis with peritonitis    Ms Martines is post appendectomy, but there is still some drainage from the wound and she is on immunosuppressive medications which would mask signs/symptoms of peritonitis/abscess. Imaging is concerning for a RLQ fluid collection which could be hematoma/post surgical changes vs abscess and raises the question of whether source control has been achieved .    Recs  - continue treating with flagyl which would cover for bacteroides   - would not restart treatment for the Ecoli at this time since patient is afebrile with no leucocytosis but would have a low threshold to restart (ciprofloxacin) if any decline   - would get surgical opinion on significance of the imaging findings and whether aspiration is appropriate        Clostridium difficile infection    Her test results, diarrhoea and leucocytosis (may be multifactorial) are concerning for cdiff infection. Would treat as non severe cdiff, first episode     Recs  - po vancomycin for 10 days          Thank you for your consult. I will follow-up with patient. Please contact us if you have any additional questions.    Virginia Gutierrez MD  Infectious Disease  [facility]    Subjective:     Principal Problem:Appendicitis with peritonitis    HPI: Sheryl Martines is a 63 y.o.  woman with a history of HTN, DM2, CAD, asthma, pontine stroke in 2012 with residual R sided weakness, osteoporosis. She presented to the ED on 8/22 with severe midline lower back pain s/p fall the day before. Also noted to have AMS, pt received imaging which was concerning for appendicitis. Further imaging revealed the extent of the appendicitis.  She was started on zosyn and Vanc on 8/24, had appendectomy on 8/26 op note further states the peritonitis was localized, appendix successfully removed, and region thoroughly irrigated with abx solution. Since surgery pt reports onset of intermittent nausea, decreased appetite, and diarrhea (12-16 episodes yesterday). Wound cultures from the procedure have grown anaerobes bacteroides fragilis, bacteroides thetaiotaomicron, and eggerthella lenta; aerobes E. Coli (cipro sensitive) and enterococcus raffinosus (vanc/genta sensitive). Primary team has been treating with cipro, flagyl, and vancomycin. On 8/28 pt tested positive for clostridium difficile. Pt denies any previous episodes of c.diff and cannot remember the last time she required abx.  Pt denies any recent sick contacts. She is retired and lives with her daughter with no pets. She has never been hospitalized for an infection in the past, and reports most recent hospitalization being last year for asthma exacerbation. ID was consulted for abx regimen recs.     Of note she is on chronic prednisone 20mg po qday for asthma      Interval History: No further bowel movements, is in fact constipated for 2 days    ID data summary  Microbiology data  Past   Wound culture from abdomen 8/26  Eggerthella   Bacteroides spp  EColi, Enterococcus  Cdiff positive    New  None    Anti-infective therapy  Past   Ceftriaxone 8/22 - 8/22  Ciprofloxacin 8/29 - 8/30  Metronidazole IV 8/25-8/30  Pip tazo 8/24-8/29  Vancomycin IV 8/24 - 8/30    Current   Metronid po 8/30 - present  Vanco po 8/30 - present    Review of Systems   Constitutional: Negative for chills and fever.   Respiratory: Negative.    Cardiovascular: Negative.    Gastrointestinal: Positive for constipation. Negative for abdominal distention and diarrhea.   Genitourinary: Negative.    Musculoskeletal: Negative.      Objective:     Vital Signs (Most Recent):  Temp: 98 °F (36.7 °C) (08/31/18 1116)  Pulse: 85 (08/31/18  1211)  Resp: 16 (08/31/18 1211)  BP: (!) 178/88 (08/31/18 1125)  SpO2: 99 % (08/31/18 0807) Vital Signs (24h Range):  Temp:  [97.8 °F (36.6 °C)-99.2 °F (37.3 °C)] 98 °F (36.7 °C)  Pulse:  [57-85] 85  Resp:  [16-20] 16  SpO2:  [96 %-100 %] 99 %  BP: (144-210)/(70-93) 178/88     Weight: 88 kg (194 lb 0.1 oz)  Body mass index is 36.66 kg/m².    Estimated Creatinine Clearance: 58.1 mL/min (based on SCr of 1 mg/dL).    Physical Exam   Constitutional: She is oriented to person, place, and time.   Eyes: EOM are normal.   Neck: No JVD present.   Cardiovascular: Normal rate, regular rhythm and normal heart sounds. Exam reveals no gallop and no friction rub.   No murmur heard.  Pulmonary/Chest: Effort normal and breath sounds normal. She has no wheezes. She has no rales.   Abdominal: Soft. Bowel sounds are normal. She exhibits no distension and no mass. There is no tenderness. There is no guarding.   Some drainage from wound site    Musculoskeletal: She exhibits no edema or deformity.   Neurological: She is alert and oriented to person, place, and time.   Skin: Skin is warm and dry.       Significant Labs:   BMP:   Recent Labs   Lab  08/31/18 0450   GLU  213*   NA  137   K  3.8   CL  94*   CO2  36*   BUN  14   CREATININE  1.0   CALCIUM  9.3   MG  1.6     CBC:   Recent Labs   Lab  08/30/18 0445  08/31/18   0450   WBC  16.09*  10.67   HGB  7.5*  7.9*   HCT  26.3*  26.9*   PLT  384*  431*     CMP:   Recent Labs   Lab  08/30/18 0445 08/31/18   0450   NA  140  137   K  3.8  3.8   CL  100  94*   CO2  33*  36*   GLU  149*  213*   BUN  15  14   CREATININE  1.0  1.0   CALCIUM  8.7  9.3   PROT  6.1  6.1   ALBUMIN  2.1*  2.1*   BILITOT  0.3  0.3   ALKPHOS  115  112   AST  22  27   ALT  18  18   ANIONGAP  7*  7*   EGFRNONAA  >60  >60     CT abd med size collection, hematoma vs abscess  Significant Imaging: I have reviewed all pertinent imaging results/findings within the past 24 hours.

## 2018-08-31 NOTE — PLAN OF CARE
Problem: Occupational Therapy Goal  Goal: Occupational Therapy Goal  Goals to be met by: 8/31/2018    Patient will increase functional independence with ADLs by performing:    Feeding with Modified [place].  UE Dressing with Set-up Assistance.  LE Dressing with Minimal Assistance.  Grooming while seated with Set-up Assistance. --MET 8/31  Toileting from bedside commode with Minimal Assistance for hygiene and clothing management.   Sitting at edge of bed x20 minutes with Supervision. --MET 8/31  Rolling to Bilateral with Modified [place].   Supine to sit with Modified [place].  Stand pivot transfers with Stand-by Assistance.  Step transfer with Supervision and Stand-by Assistance  Toilet transfer to bedside commode with Stand-by Assistance.  Increased functional strength to WFL for BUE.  Upper extremity exercise program 10 reps per handout, with supervision.     Outcome: Ongoing (interventions implemented as appropriate)  Patient required encouragement to participate, as she reports she just got back to bed. Patient will benefit from SNF upon D/C to maximize strength and endurance for ADL tasks.

## 2018-08-31 NOTE — HPI
Sheryl Martines is a 63 y.o. woman with a history of HTN, DM2, CAD, asthma, pontine stroke in 2012 with residual R sided weakness, osteoporosis. She presented to the ED on 8/22 with severe midline lower back pain s/p fall the day before. Also noted to have AMS, pt received imaging which was concerning for appendicitis. Further imaging revealed the extent of the appendicitis. She was started on zosyn and Vanc on 8/24, had appendectomy on 8/26 op note further states the peritonitis was localized, appendix successfully removed, and region thoroughly irrigated with abx solution. Since surgery pt reports onset of intermittent nausea, decreased appetite, and diarrhea (12-16 episodes yesterday). Wound cultures from the procedure have grown anaerobes bacteroides fragilis, bacteroides thetaiotaomicron, and eggerthella lenta; aerobes E. Coli (cipro sensitive) and enterococcus raffinosus (vanc/genta sensitive). Primary team has been treating with cipro, flagyl, and vancomycin. On 8/28 pt tested positive for clostridium difficile. Pt denies any previous episodes of c.diff and cannot remember the last time she required abx.  Pt denies any recent sick contacts. She is retired and lives with her daughter with no pets. She has never been hospitalized for an infection in the past, and reports most recent hospitalization being last year for asthma exacerbation. ID was consulted for abx regimen recs.     Of note she is on chronic prednisone 20mg po qday for asthma

## 2018-08-31 NOTE — PLAN OF CARE
Problem: Physical Therapy Goal  Goal: Physical Therapy Goal  Goals to be met by: 9/27/2018     Patient will increase functional independence with mobility by performing:    Updated 8/27/2018: David completed this date  1. Supine to sit with Modified [place]  2. Sit to supine with Modified [place]  3. Rolling to Left and Right with Modified [place].  4. Sit to stand transfer with Stand-by Assistance with AD  5. Bed to chair transfer with Stand-by Assistance with AD  6. Gait  x  feet with Stand-by Assistance using AD.   7. Ascend/Descend 8 inch curb step with Contact Guard Assistance and Minimal Assistance using AD  8. Lower extremity exercise program x10 reps with supervision      Outcome: Ongoing (interventions implemented as appropriate)  Pt feeling a little better today; pain~5/10 in lower abdomen; increased mobility and tolerance to activity; able to use RW to take steps from bed to chair; cont with POC.

## 2018-08-31 NOTE — ASSESSMENT & PLAN NOTE
Her test results, diarrhoea and leucocytosis (may be multifactorial) are concerning for cdiff infection. Would treat as non severe cdiff, first episode     Recs  - po vancomycin for 10 days

## 2018-08-31 NOTE — SUBJECTIVE & OBJECTIVE
Interval History: No further bowel movements, is in fact constipated for 2 days    ID data summary  Microbiology data  Past   Wound culture from abdomen 8/26  Eggerthella   Bacteroides spp  EColi, Enterococcus  Cdiff positive    New  None    Anti-infective therapy  Past   Ceftriaxone 8/22 - 8/22  Ciprofloxacin 8/29 - 8/30  Metronidazole IV 8/25-8/30  Pip tazo 8/24-8/29  Vancomycin IV 8/24 - 8/30    Current   Metronid po 8/30 - present  Vanco po 8/30 - present    Review of Systems   Constitutional: Negative for chills and fever.   Respiratory: Negative.    Cardiovascular: Negative.    Gastrointestinal: Positive for constipation. Negative for abdominal distention and diarrhea.   Genitourinary: Negative.    Musculoskeletal: Negative.      Objective:     Vital Signs (Most Recent):  Temp: 98 °F (36.7 °C) (08/31/18 1116)  Pulse: 85 (08/31/18 1211)  Resp: 16 (08/31/18 1211)  BP: (!) 178/88 (08/31/18 1125)  SpO2: 99 % (08/31/18 0807) Vital Signs (24h Range):  Temp:  [97.8 °F (36.6 °C)-99.2 °F (37.3 °C)] 98 °F (36.7 °C)  Pulse:  [57-85] 85  Resp:  [16-20] 16  SpO2:  [96 %-100 %] 99 %  BP: (144-210)/(70-93) 178/88     Weight: 88 kg (194 lb 0.1 oz)  Body mass index is 36.66 kg/m².    Estimated Creatinine Clearance: 58.1 mL/min (based on SCr of 1 mg/dL).    Physical Exam   Constitutional: She is oriented to person, place, and time.   Eyes: EOM are normal.   Neck: No JVD present.   Cardiovascular: Normal rate, regular rhythm and normal heart sounds. Exam reveals no gallop and no friction rub.   No murmur heard.  Pulmonary/Chest: Effort normal and breath sounds normal. She has no wheezes. She has no rales.   Abdominal: Soft. Bowel sounds are normal. She exhibits no distension and no mass. There is no tenderness. There is no guarding.   Some drainage from wound site    Musculoskeletal: She exhibits no edema or deformity.   Neurological: She is alert and oriented to person, place, and time.   Skin: Skin is warm and dry.        Significant Labs:   BMP:   Recent Labs   Lab  08/31/18   0450   GLU  213*   NA  137   K  3.8   CL  94*   CO2  36*   BUN  14   CREATININE  1.0   CALCIUM  9.3   MG  1.6     CBC:   Recent Labs   Lab  08/30/18   0445  08/31/18   0450   WBC  16.09*  10.67   HGB  7.5*  7.9*   HCT  26.3*  26.9*   PLT  384*  431*     CMP:   Recent Labs   Lab  08/30/18 0445  08/31/18   0450   NA  140  137   K  3.8  3.8   CL  100  94*   CO2  33*  36*   GLU  149*  213*   BUN  15  14   CREATININE  1.0  1.0   CALCIUM  8.7  9.3   PROT  6.1  6.1   ALBUMIN  2.1*  2.1*   BILITOT  0.3  0.3   ALKPHOS  115  112   AST  22  27   ALT  18  18   ANIONGAP  7*  7*   EGFRNONAA  >60  >60     CT abd med size collection, hematoma vs abscess  Significant Imaging: I have reviewed all pertinent imaging results/findings within the past 24 hours.

## 2018-09-01 LAB
ALBUMIN SERPL BCP-MCNC: 2.2 G/DL
ALP SERPL-CCNC: 110 U/L
ALT SERPL W/O P-5'-P-CCNC: 16 U/L
ANION GAP SERPL CALC-SCNC: 9 MMOL/L
AST SERPL-CCNC: 15 U/L
BACTERIA BLD CULT: NORMAL
BACTERIA BLD CULT: NORMAL
BASOPHILS # BLD AUTO: 0 K/UL
BASOPHILS NFR BLD: 0 %
BILIRUB SERPL-MCNC: 0.4 MG/DL
BUN SERPL-MCNC: 15 MG/DL
CALCIUM SERPL-MCNC: 9.5 MG/DL
CHLORIDE SERPL-SCNC: 87 MMOL/L
CO2 SERPL-SCNC: 40 MMOL/L
CREAT SERPL-MCNC: 0.9 MG/DL
DIFFERENTIAL METHOD: ABNORMAL
EOSINOPHIL # BLD AUTO: 0 K/UL
EOSINOPHIL NFR BLD: 0 %
ERYTHROCYTE [DISTWIDTH] IN BLOOD BY AUTOMATED COUNT: 16.2 %
EST. GFR  (AFRICAN AMERICAN): >60 ML/MIN/1.73 M^2
EST. GFR  (NON AFRICAN AMERICAN): >60 ML/MIN/1.73 M^2
GLUCOSE SERPL-MCNC: 263 MG/DL
HCT VFR BLD AUTO: 27.5 %
HGB BLD-MCNC: 8.2 G/DL
LYMPHOCYTES # BLD AUTO: 0.9 K/UL
LYMPHOCYTES NFR BLD: 8.3 %
MAGNESIUM SERPL-MCNC: 1.4 MG/DL
MCH RBC QN AUTO: 23.7 PG
MCHC RBC AUTO-ENTMCNC: 29.8 G/DL
MCV RBC AUTO: 80 FL
MONOCYTES # BLD AUTO: 1 K/UL
MONOCYTES NFR BLD: 8.9 %
NEUTROPHILS # BLD AUTO: 8.8 K/UL
NEUTROPHILS NFR BLD: 82.4 %
PHOSPHATE SERPL-MCNC: 2.5 MG/DL
PLATELET # BLD AUTO: 519 K/UL
PMV BLD AUTO: 8.8 FL
POCT GLUCOSE: 179 MG/DL (ref 70–110)
POCT GLUCOSE: 282 MG/DL (ref 70–110)
POCT GLUCOSE: 309 MG/DL (ref 70–110)
POCT GLUCOSE: 348 MG/DL (ref 70–110)
POTASSIUM SERPL-SCNC: 3.3 MMOL/L
PROT SERPL-MCNC: 6 G/DL
RBC # BLD AUTO: 3.46 M/UL
SODIUM SERPL-SCNC: 136 MMOL/L
WBC # BLD AUTO: 10.65 K/UL

## 2018-09-01 PROCEDURE — 63600175 PHARM REV CODE 636 W HCPCS: Performed by: STUDENT IN AN ORGANIZED HEALTH CARE EDUCATION/TRAINING PROGRAM

## 2018-09-01 PROCEDURE — 85025 COMPLETE CBC W/AUTO DIFF WBC: CPT

## 2018-09-01 PROCEDURE — 27000221 HC OXYGEN, UP TO 24 HOURS

## 2018-09-01 PROCEDURE — 99900035 HC TECH TIME PER 15 MIN (STAT)

## 2018-09-01 PROCEDURE — 83735 ASSAY OF MAGNESIUM: CPT

## 2018-09-01 PROCEDURE — 25000242 PHARM REV CODE 250 ALT 637 W/ HCPCS: Performed by: STUDENT IN AN ORGANIZED HEALTH CARE EDUCATION/TRAINING PROGRAM

## 2018-09-01 PROCEDURE — 25000003 PHARM REV CODE 250: Performed by: OPHTHALMOLOGY

## 2018-09-01 PROCEDURE — 11000001 HC ACUTE MED/SURG PRIVATE ROOM

## 2018-09-01 PROCEDURE — S0030 INJECTION, METRONIDAZOLE: HCPCS | Performed by: INTERNAL MEDICINE

## 2018-09-01 PROCEDURE — 80053 COMPREHEN METABOLIC PANEL: CPT

## 2018-09-01 PROCEDURE — 25000003 PHARM REV CODE 250: Performed by: STUDENT IN AN ORGANIZED HEALTH CARE EDUCATION/TRAINING PROGRAM

## 2018-09-01 PROCEDURE — 25000003 PHARM REV CODE 250: Performed by: INTERNAL MEDICINE

## 2018-09-01 PROCEDURE — 94799 UNLISTED PULMONARY SVC/PX: CPT

## 2018-09-01 PROCEDURE — 94761 N-INVAS EAR/PLS OXIMETRY MLT: CPT

## 2018-09-01 PROCEDURE — 97530 THERAPEUTIC ACTIVITIES: CPT

## 2018-09-01 PROCEDURE — 25000003 PHARM REV CODE 250: Performed by: FAMILY MEDICINE

## 2018-09-01 PROCEDURE — 94640 AIRWAY INHALATION TREATMENT: CPT

## 2018-09-01 PROCEDURE — A4216 STERILE WATER/SALINE, 10 ML: HCPCS | Performed by: STUDENT IN AN ORGANIZED HEALTH CARE EDUCATION/TRAINING PROGRAM

## 2018-09-01 PROCEDURE — 94664 DEMO&/EVAL PT USE INHALER: CPT

## 2018-09-01 PROCEDURE — 25000003 PHARM REV CODE 250: Performed by: HOSPITALIST

## 2018-09-01 PROCEDURE — 84100 ASSAY OF PHOSPHORUS: CPT

## 2018-09-01 RX ORDER — POLYETHYLENE GLYCOL 3350 17 G/17G
17 POWDER, FOR SOLUTION ORAL DAILY
Status: DISCONTINUED | OUTPATIENT
Start: 2018-09-02 | End: 2018-09-01

## 2018-09-01 RX ORDER — METRONIDAZOLE 500 MG/100ML
500 INJECTION, SOLUTION INTRAVENOUS
Status: DISCONTINUED | OUTPATIENT
Start: 2018-09-01 | End: 2018-09-04

## 2018-09-01 RX ORDER — BISACODYL 10 MG
10 SUPPOSITORY, RECTAL RECTAL DAILY PRN
Status: DISCONTINUED | OUTPATIENT
Start: 2018-09-01 | End: 2018-09-05 | Stop reason: HOSPADM

## 2018-09-01 RX ORDER — MAGNESIUM SULFATE HEPTAHYDRATE 40 MG/ML
2 INJECTION, SOLUTION INTRAVENOUS ONCE
Status: COMPLETED | OUTPATIENT
Start: 2018-09-01 | End: 2018-09-01

## 2018-09-01 RX ORDER — AMOXICILLIN 250 MG
1 CAPSULE ORAL 2 TIMES DAILY
Status: DISCONTINUED | OUTPATIENT
Start: 2018-09-01 | End: 2018-09-05 | Stop reason: HOSPADM

## 2018-09-01 RX ORDER — POTASSIUM CHLORIDE 20 MEQ/1
40 TABLET, EXTENDED RELEASE ORAL DAILY
Status: DISCONTINUED | OUTPATIENT
Start: 2018-09-01 | End: 2018-09-05 | Stop reason: HOSPADM

## 2018-09-01 RX ADMIN — ENOXAPARIN SODIUM 30 MG: 100 INJECTION SUBCUTANEOUS at 05:09

## 2018-09-01 RX ADMIN — LIDOCAINE 1 PATCH: 50 PATCH CUTANEOUS at 01:09

## 2018-09-01 RX ADMIN — PRAZOSIN HYDROCHLORIDE 5 MG: 1 CAPSULE ORAL at 08:09

## 2018-09-01 RX ADMIN — SIMETHICONE 125 MG: 125 TABLET, CHEWABLE ORAL at 05:09

## 2018-09-01 RX ADMIN — PROMETHAZINE HYDROCHLORIDE 6.25 MG: 25 INJECTION INTRAMUSCULAR; INTRAVENOUS at 02:09

## 2018-09-01 RX ADMIN — TRAMADOL HYDROCHLORIDE 50 MG: 50 TABLET, COATED ORAL at 07:09

## 2018-09-01 RX ADMIN — PREDNISONE 20 MG: 20 TABLET ORAL at 09:09

## 2018-09-01 RX ADMIN — FUROSEMIDE 40 MG: 40 TABLET ORAL at 08:09

## 2018-09-01 RX ADMIN — ATORVASTATIN CALCIUM 40 MG: 40 TABLET, FILM COATED ORAL at 08:09

## 2018-09-01 RX ADMIN — ASPIRIN 81 MG: 81 TABLET, COATED ORAL at 09:09

## 2018-09-01 RX ADMIN — PRAZOSIN HYDROCHLORIDE 5 MG: 1 CAPSULE ORAL at 09:09

## 2018-09-01 RX ADMIN — SIMETHICONE 125 MG: 125 TABLET, CHEWABLE ORAL at 12:09

## 2018-09-01 RX ADMIN — ONDANSETRON 8 MG: 8 TABLET, ORALLY DISINTEGRATING ORAL at 06:09

## 2018-09-01 RX ADMIN — INSULIN ASPART 2 UNITS: 100 INJECTION, SOLUTION INTRAVENOUS; SUBCUTANEOUS at 12:09

## 2018-09-01 RX ADMIN — THEOPHYLLINE ANHYDROUS 200 MG: 100 CAPSULE, EXTENDED RELEASE ORAL at 09:09

## 2018-09-01 RX ADMIN — Medication 500 MG: at 05:09

## 2018-09-01 RX ADMIN — FAMOTIDINE 20 MG: 20 TABLET, FILM COATED ORAL at 08:09

## 2018-09-01 RX ADMIN — INSULIN ASPART 6 UNITS: 100 INJECTION, SOLUTION INTRAVENOUS; SUBCUTANEOUS at 06:09

## 2018-09-01 RX ADMIN — Medication 500 MG: at 03:09

## 2018-09-01 RX ADMIN — DICLOFENAC: 10 GEL TOPICAL at 09:09

## 2018-09-01 RX ADMIN — CLOPIDOGREL BISULFATE 75 MG: 75 TABLET ORAL at 08:09

## 2018-09-01 RX ADMIN — Medication 500 MG: at 09:09

## 2018-09-01 RX ADMIN — LIDOCAINE HYDROCHLORIDE: 20 SOLUTION ORAL; TOPICAL at 05:09

## 2018-09-01 RX ADMIN — BISACODYL 10 MG: 10 SUPPOSITORY RECTAL at 06:09

## 2018-09-01 RX ADMIN — INSULIN DETEMIR 5 UNITS: 100 INJECTION, SOLUTION SUBCUTANEOUS at 09:09

## 2018-09-01 RX ADMIN — TOBRAMYCIN AND DEXAMETHASONE 1 DROP: 3; 1 SUSPENSION/ DROPS OPHTHALMIC at 09:09

## 2018-09-01 RX ADMIN — IPRATROPIUM BROMIDE AND ALBUTEROL SULFATE 3 ML: .5; 3 SOLUTION RESPIRATORY (INHALATION) at 12:09

## 2018-09-01 RX ADMIN — FAMOTIDINE 20 MG: 20 TABLET, FILM COATED ORAL at 09:09

## 2018-09-01 RX ADMIN — Medication 500 MG: at 11:09

## 2018-09-01 RX ADMIN — METRONIDAZOLE 500 MG: 500 TABLET ORAL at 07:09

## 2018-09-01 RX ADMIN — DILTIAZEM HYDROCHLORIDE 180 MG: 180 CAPSULE, EXTENDED RELEASE ORAL at 09:09

## 2018-09-01 RX ADMIN — METRONIDAZOLE 500 MG: 500 INJECTION, SOLUTION INTRAVENOUS at 11:09

## 2018-09-01 RX ADMIN — Medication 3 ML: at 06:09

## 2018-09-01 RX ADMIN — PROMETHAZINE HYDROCHLORIDE 6.25 MG: 25 INJECTION INTRAMUSCULAR; INTRAVENOUS at 09:09

## 2018-09-01 RX ADMIN — IPRATROPIUM BROMIDE AND ALBUTEROL SULFATE 3 ML: .5; 3 SOLUTION RESPIRATORY (INHALATION) at 07:09

## 2018-09-01 RX ADMIN — MAGNESIUM SULFATE IN WATER 2 G: 40 INJECTION, SOLUTION INTRAVENOUS at 12:09

## 2018-09-01 RX ADMIN — SENNOSIDES AND DOCUSATE SODIUM 1 TABLET: 8.6; 5 TABLET ORAL at 09:09

## 2018-09-01 RX ADMIN — ACETAMINOPHEN 650 MG: 325 TABLET, FILM COATED ORAL at 06:09

## 2018-09-01 RX ADMIN — ACETAMINOPHEN 650 MG: 325 TABLET, FILM COATED ORAL at 01:09

## 2018-09-01 RX ADMIN — PREDNISONE 20 MG: 20 TABLET ORAL at 08:09

## 2018-09-01 RX ADMIN — Medication 3 ML: at 02:09

## 2018-09-01 RX ADMIN — TOBRAMYCIN AND DEXAMETHASONE 1 DROP: 3; 1 SUSPENSION/ DROPS OPHTHALMIC at 06:09

## 2018-09-01 RX ADMIN — ONDANSETRON 8 MG: 8 TABLET, ORALLY DISINTEGRATING ORAL at 12:09

## 2018-09-01 RX ADMIN — FUROSEMIDE 40 MG: 40 TABLET ORAL at 09:09

## 2018-09-01 RX ADMIN — LOSARTAN POTASSIUM 100 MG: 50 TABLET, FILM COATED ORAL at 09:09

## 2018-09-01 RX ADMIN — METRONIDAZOLE 500 MG: 500 INJECTION, SOLUTION INTRAVENOUS at 04:09

## 2018-09-01 RX ADMIN — Medication 3 ML: at 09:09

## 2018-09-01 RX ADMIN — TOBRAMYCIN AND DEXAMETHASONE 1 DROP: 3; 1 SUSPENSION/ DROPS OPHTHALMIC at 04:09

## 2018-09-01 RX ADMIN — POTASSIUM CHLORIDE 40 MEQ: 1500 TABLET, EXTENDED RELEASE ORAL at 12:09

## 2018-09-01 RX ADMIN — INSULIN ASPART 5 UNITS: 100 INJECTION, SOLUTION INTRAVENOUS; SUBCUTANEOUS at 09:09

## 2018-09-01 NOTE — PROGRESS NOTES
PGY-1 Progress Note [facility]  Follow up for: open appendectomy    Chief Complaint   Patient presents with    Altered Mental Status     To ER with EMS stating that the family called for altered mental status but was awake and alert when they arrived.  pt with slurred speech and has slept alot today, taking pain medication for an injury one week ago.     Hospital Stay Day 9    Subjective: Afebrile VSS, good uop, tolerating diet, reports decreased appetite. Reports an decrease in abdominal pain.  Denies any CP, N/V/D, headaches, fever or chills.     Scheduled Meds:   albuterol-ipratropium  3 mL Nebulization Q4H WAKE    aspirin  81 mg Oral Daily    atorvastatin  40 mg Oral Daily    clopidogrel  75 mg Oral Daily    diclofenac sodium   Topical (Top) Daily    diltiaZEM  180 mg Oral Daily    enoxaparin  30 mg Subcutaneous Daily    famotidine  20 mg Oral BID    furosemide  40 mg Oral BID    lidocaine  1 patch Transdermal Q24H    losartan  100 mg Oral Daily    magnesium sulfate IVPB  2 g Intravenous Once    metroNIDAZOLE  500 mg Oral Q8H    prazosin  5 mg Oral BID    predniSONE  20 mg Per OG tube BID    sodium chloride 0.9%  3 mL Intravenous Q8H    theophylline  200 mg Oral Daily    tobramycin-dexamethasone 0.3-0.1%  1 drop Both Eyes Q4H While awake    vancomycin  500 mg Oral Q6H     Continuous Infusions:  PRN Meds:acetaminophen, albuterol, artificial tears, dextrose 50%, diphenhydrAMINE, glucagon (human recombinant), glucose, glucose, HYDROmorphone, insulin aspart U-100, labetalol, lidocaine-prilocaine, loperamide, ondansetron, promethazine (PHENERGAN) IVPB, simethicone, traMADol    Review of patient's allergies indicates:   Allergen Reactions    Ace inhibitors Swelling    Corticosteroids (glucocorticoids)     Hydralazine analogues     Tetracyclines Swelling    Travatan (with benzalkonium) [travoprost (benzalkonium)]        Objectives:     Vitals(Most Recent)      BP  Min: 165/77  Max: 191/89  Temp   Av.6 °F (37 °C)  Min: 98.3 °F (36.8 °C)  Max: 98.8 °F (37.1 °C)  Pulse  Av.6  Min: 54  Max: 87  Resp  Av.4  Min: 16  Max: 28  SpO2  Av %  Min: 100 %  Max: 100 %             Vitals(Hbmn50b)  Temp:  [98.3 °F (36.8 °C)-98.8 °F (37.1 °C)]   Pulse:  [54-87]   Resp:  [16-28]   BP: (165-191)/(77-89)   SpO2:  [100 %]     I & O(Ugph24a)    Intake/Output Summary (Last 24 hours) at 2018 1123  Last data filed at 2018 0653  Gross per 24 hour   Intake --   Output 900 ml   Net -900 ml     General: AAOx3. NAD. Resting comfortably in bed  HEENT: NCAT. PERRLA. EOMI.   Neck: Supple. No JVD. No LAD.    CV: RRR, no murmur   Chest: Minimal wheezing intermittently at lung bases.     Abd: Soft. ND, TTP at surgical site.  No rebound or guarding.   Ext: no edema appreciated  Skin: Intact. No rash. No lesions.   Neuro: CN II-XII intact. No focal deficit. Strength 5/5 throughout. Sensation intact.   Psych: Good judgement and reason. NL affect. No abnormal behaviors noted    LABS  CBC  Recent Labs   Lab  18   0646   WBC  16.09*  10.67  10.65   RBC  3.14*  3.31*  3.46*   HGB  7.5*  7.9*  8.2*   HCT  26.3*  26.9*  27.5*   PLT  384*  431*  519*   MCV  84  81*  80*   MCH  23.9*  23.9*  23.7*   MCHC  28.5*  29.4*  29.8*     BMP  Recent Labs   Lab  18   0450  18   0646   NA  140  137  136   K  3.8  3.8  3.3*   CO2  33*  36*  40*   CL  100  94*  87*   BUN  15  14  15   CREATININE  1.0  1.0  0.9   GLU  149*  213*  263*       POCT-Glucose  POCT Glucose   Date Value Ref Range Status   2018 282 (H) 70 - 110 mg/dL Final   2018 319 (H) 70 - 110 mg/dL Final   2018 249 (H) 70 - 110 mg/dL Final   2018 217 (H) 70 - 110 mg/dL Final   2018 241 (H) 70 - 110 mg/dL Final   2018 285 (H) 70 - 110 mg/dL Final   2018 238 (H) 70 - 110 mg/dL Final   2018 275 (H) 70 - 110 mg/dL Final   2018 176 (H) 70 - 110 mg/dL Final    08/29/2018 162 (H) 70 - 110 mg/dL Final   08/29/2018 182 (H) 70 - 110 mg/dL Final   08/29/2018 166 (H) 70 - 110 mg/dL Final       Recent Labs   Lab  08/30/18   0445  08/31/18   0450  09/01/18   0646   CALCIUM  8.7  9.3  9.5   MG  1.7  1.6  1.4*   PHOS  2.4*  1.9*  2.5*     LFT  Recent Labs   Lab  08/30/18   0445  08/31/18   0450  09/01/18   0646   PROT  6.1  6.1  6.0   ALBUMIN  2.1*  2.1*  2.2*   BILITOT  0.3  0.3  0.4   AST  22  27  15   ALKPHOS  115  112  110   ALT  18  18  16   CE  No results for input(s): TROPONINI, CKTOTAL, CKMB in the last 168 hours.  BNP  Recent Labs   Lab  08/26/18   1550   BNP  45     LAST HbA1c  Lab Results   Component Value Date    HGBA1C 8.1 (H) 08/23/2018     Imaging- no new imaging      Assessment/Plan:   63 y.o. 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  Patient went to OR Sunday AM for appendectomy.        Appendiceal Abscess  - Blood cultures x 2 - Negative   - POD#5 for open appendectomy   - Abx- flagyl   - Surgical site dressing changed yesterday by surgery  - Appendix culture- positive for E coli sensitive to cipro. Received 2 days of IV cipro.   - ID consult: continue oral flagyl for bacteroides infection. CTA with possible fluid collection in abdomen  ID recommended IR consult for possible drainage, but IR does not think that drainage is necessary.     C. Diff Infection  - Positive c diff toxin by PCR and c diff EIA  - Per ID: continue PO vancomycin for 10 days    NIKHIL  - BUN/Crea of 26/2.5 on admit  - BUN/Crea decreasing daily  - continue to monitor     CVA    CT head is negative for acute hemorrhagic event  Anti-platelet therapy: ASA 81/Plavix 75mg   Atorvastatin 40mg PO daily  Q4 nuero checks  PT/OT/ST  Fall precautions  Drug screen pos for opiates      EEG performed- encephalopathy      CTA head and neck Unremarkable and without evidence for proximal significant stenosis or  occlusion.     HTN  Clonidine .1mg PO BID  Lasix 40mg PO BID  Diltiazem 180mg ER PO daily      DM2  -Patient on moderate dose SSI     CAD  - Continue ASA and plavix and Atorvastatin      Elevated troponin- resolving   - 0.273-->0.267--> 0.233--> .098 --> resolved     PT/OT: ordered and following  Code: full  Diet: full liquid, advance as tolerated   Ppx: dvt: lovenox     Dispo: follow up surgery, ID recs     Case discussed with staff    Arsenio Munson  [facility] PGY-2  09/01/2018

## 2018-09-01 NOTE — ASSESSMENT & PLAN NOTE
Her test results, diarrhoea and leucocytosis (may be multifactorial) are concerning for cdiff infection. Would treat as non severe cdiff, first episode. Her current nausea, constipation and abd distension, in addition to her ileus on recent imaging are concerning. Complicated CDiff can present as an ileus also     Recs  - po vancomycin for 14 days from 8/30  - would recommend Abd X ray

## 2018-09-01 NOTE — SUBJECTIVE & OBJECTIVE
Interval History:     No BMs overnight, has passed flatus though. Has been vomiting    Review of Systems   Constitutional: Negative.    HENT: Negative.    Eyes: Negative.    Respiratory: Negative for cough.    Gastrointestinal: Positive for abdominal distention, abdominal pain, constipation and nausea. Negative for diarrhea.   Endocrine: Positive for cold intolerance.   Genitourinary: Negative.    Musculoskeletal: Negative.    Allergic/Immunologic: Negative.    Neurological: Negative.    Hematological: Negative.    Psychiatric/Behavioral: Negative.      Objective:     Vital Signs (Most Recent):  Temp: 98.7 °F (37.1 °C) (09/01/18 1239)  Pulse: 87 (09/01/18 1239)  Resp: 20 (09/01/18 1239)  BP: (!) 147/74 (09/01/18 1239)  SpO2: 96 % (09/01/18 1216) Vital Signs (24h Range):  Temp:  [98.3 °F (36.8 °C)-98.8 °F (37.1 °C)] 98.7 °F (37.1 °C)  Pulse:  [61-87] 87  Resp:  [16-28] 20  SpO2:  [96 %-100 %] 96 %  BP: (147-191)/(74-89) 147/74     Weight: 88 kg (194 lb 0.1 oz)  Body mass index is 36.66 kg/m².    Estimated Creatinine Clearance: 64.5 mL/min (based on SCr of 0.9 mg/dL).    Physical Exam   Constitutional: She is oriented to person, place, and time.   Neck: No JVD present.   Cardiovascular: Normal rate, regular rhythm and normal heart sounds.   Pulmonary/Chest: Effort normal and breath sounds normal. She has no wheezes. She has no rales.   Abdominal: She exhibits distension. There is tenderness. There is no rebound and no guarding.   Distended, small amount of drainage from wound site , normal bowel sounds, mainly major-incisional tenderness although there is some tenderness distant from the surgical site   Musculoskeletal: She exhibits no edema.   Neurological: She is alert and oriented to person, place, and time.   Skin: Skin is warm and dry.   Psychiatric: She has a normal mood and affect. Her behavior is normal. Thought content normal.       Significant Labs:   BMP:   Recent Labs   Lab  09/01/18   0646   GLU  263*   NA   136   K  3.3*   CL  87*   CO2  40*   BUN  15   CREATININE  0.9   CALCIUM  9.5   MG  1.4*     CBC:   Recent Labs   Lab  08/31/18 0450 09/01/18   0646   WBC  10.67  10.65   HGB  7.9*  8.2*   HCT  26.9*  27.5*   PLT  431*  519*     CMP:   Recent Labs   Lab  08/31/18 0450 09/01/18   0646   NA  137  136   K  3.8  3.3*   CL  94*  87*   CO2  36*  40*   GLU  213*  263*   BUN  14  15   CREATININE  1.0  0.9   CALCIUM  9.3  9.5   PROT  6.1  6.0   ALBUMIN  2.1*  2.2*   BILITOT  0.3  0.4   ALKPHOS  112  110   AST  27  15   ALT  18  16   ANIONGAP  7*  9   EGFRNONAA  >60  >60       Significant Imaging: I have reviewed all pertinent imaging results/findings within the past 24 hours.

## 2018-09-01 NOTE — ASSESSMENT & PLAN NOTE
Ms Martines is post appendectomy, but there is still some drainage from the wound and she is on immunosuppressive medications which would mask signs/symptoms of peritonitis/abscess. Imaging is concerning for a RLQ fluid collection which could be hematoma/post surgical changes vs abscess, although according to surgery, more likely post op changes than fluid collection    Recs  - continue treating with flagyl which would cover for bacteroides but switch to IV instead of po which may have marginal improvement in nausea    - would not restart treatment for the Ecoli at this time since patient is afebrile with no leucocytosis but would have a low threshold to restart (ciprofloxacin) if any decline

## 2018-09-01 NOTE — PLAN OF CARE
"Problem: Physical Therapy Goal  Goal: Physical Therapy Goal  Goals to be met by: 9/27/2018     Patient will increase functional independence with mobility by performing:    Updated 8/27/2018: RikMariangel completed this date  1. Supine to sit with Modified [place]  2. Sit to supine with Modified [place]  3. Rolling to Left and Right with Modified [place].  4. Sit to stand transfer with Stand-by Assistance with AD  5. Bed to chair transfer with Stand-by Assistance with AD  6. Gait  x  feet with Stand-by Assistance using AD.   7. Ascend/Descend 8 inch curb step with Contact Guard Assistance and Minimal Assistance using AD  8. Lower extremity exercise program x10 reps with supervision      Outcome: Ongoing (interventions implemented as appropriate)  Patient attempted to amb to bathroom but it was urgent and had to be placed on bed pan; after pt rolling to remove bedpan, placed bed flat for pt to assist with transferring to HOB pulling on the siderails and pushing with LEs; returned HOB elevated and pt c/o being "miserable" because of nausea, vomiting, 8/10 pain R upper abdomen/lower chest; informed nurse; unable to continue with therapy session.      "

## 2018-09-01 NOTE — PROGRESS NOTES
"Surgery follow up.  BP (!) 179/80   Pulse 87   Temp 98.8 °F (37.1 °C)   Resp (!) 28   Ht 5' 1" (1.549 m)   Wt 88 kg (194 lb 0.1 oz)   SpO2 100%   Breastfeeding? No   BMI 36.66 kg/m²   I/O last 3 completed shifts:  In: -   Out: 2150 [Urine:2150]  No intake/output data recorded.  Recent Results (from the past 336 hour(s))   CBC auto differential    Collection Time: 09/01/18  6:46 AM   Result Value Ref Range    WBC 10.65 3.90 - 12.70 K/uL    Hemoglobin 8.2 (L) 12.0 - 16.0 g/dL    Hematocrit 27.5 (L) 37.0 - 48.5 %    Platelets 519 (H) 150 - 350 K/uL   CBC auto differential    Collection Time: 08/31/18  4:50 AM   Result Value Ref Range    WBC 10.67 3.90 - 12.70 K/uL    Hemoglobin 7.9 (L) 12.0 - 16.0 g/dL    Hematocrit 26.9 (L) 37.0 - 48.5 %    Platelets 431 (H) 150 - 350 K/uL   CBC auto differential    Collection Time: 08/30/18  4:45 AM   Result Value Ref Range    WBC 16.09 (H) 3.90 - 12.70 K/uL    Hemoglobin 7.5 (L) 12.0 - 16.0 g/dL    Hematocrit 26.3 (L) 37.0 - 48.5 %    Platelets 384 (H) 150 - 350 K/uL     Recent Results (from the past 336 hour(s))   Basic metabolic panel    Collection Time: 08/24/18 12:06 PM   Result Value Ref Range    Sodium 135 (L) 136 - 145 mmol/L    Potassium 3.5 3.5 - 5.1 mmol/L    Chloride 91 (L) 95 - 110 mmol/L    CO2 32 (H) 23 - 29 mmol/L    BUN, Bld 20 8 - 23 mg/dL    Creatinine 1.7 (H) 0.5 - 1.4 mg/dL    Calcium 9.6 8.7 - 10.5 mg/dL    Anion Gap 12 8 - 16 mmol/L   Abdomen soft mild tenderness right lower quadrant, passing gas, still poor epitite  Continue present treatment.  "

## 2018-09-01 NOTE — PT/OT/SLP PROGRESS
Physical Therapy Treatment    Patient Name:  Sheryl Martines   MRN:  [id number]    Recommendations:     Discharge Recommendations:  nursing facility, skilled   Discharge Equipment Recommendations: walker, rolling   Barriers to discharge: Inaccessible home and Decreased caregiver support    Assessment:     Sheryl Martines is a 63 y.o. female admitted with a medical diagnosis of Appendicitis with peritonitis.  She presents with the following impairments/functional limitations:  weakness, impaired endurance, gait instability, impaired functional mobilty, impaired self care skills, decreased upper extremity function, decreased lower extremity function, pain, impaired skin, edema, impaired cardiopulmonary response to activity, impaired balance Poor tolerance to therapy 2/2 N/V, lower R chest/upper R abdominal pain.    Rehab Prognosis:  good; patient would benefit from acute skilled PT services to address these deficits and reach maximum level of function.      Recent Surgery: Procedure(s) (LRB):  APPENDECTOMY, LAPAROSCOPIC---CONVERTED TO OPEN APPENDECTOMY @0950 (N/A)  APPENDECTOMY (N/A) 6 Days Post-Op    Plan:     During this hospitalization, patient to be seen 6 x/week to address the above listed problems via gait training, therapeutic activities, therapeutic exercises  · Plan of Care Expires:  09/27/18   Plan of Care Reviewed with: patient    Subjective     Communicated with nurse prior to session.  Patient found R sidelying upon PT entry to room, agreeable to treatment.      Pain/Comfort:  · Pain Rating 1: (no initial c/o pain)  · Location - Side 1: Right  · Location - Orientation 1: upper  · Location 1: abdomen(lower R chest)  · Pain Addressed 1: Nurse notified, Cessation of Activity  · Pain Rating Post-Intervention 1: 8/10    Patients cultural, spiritual, Jain conflicts given the current situation: none reported    Objective:     Patient found with: bed alarm, oxygen, telemetry     General Precautions:  "Standard, fall, respiratory   Orthopedic Precautions:N/A   Braces: N/A     Functional Mobility:  · Bed Mobility:     · Rolling Left:  supervision  · Rolling Right: supervision  · Scooting: minimum assistance and patient assisting by pulling on side rails and using BLEs to push      AM-PAC 6 CLICK MOBILITY  Turning over in bed (including adjusting bedclothes, sheets and blankets)?: 3  Sitting down on and standing up from a chair with arms (e.g., wheelchair, bedside commode, etc.): 3  Moving from lying on back to sitting on the side of the bed?: 3  Moving to and from a bed to a chair (including a wheelchair)?: 3  Need to walk in hospital room?: 3  Climbing 3-5 steps with a railing?: 1  Basic Mobility Total Score: 16       Therapeutic Activities and Exercises:   Patient attempted to amb to bathroom but it was urgent and had to be placed on bed pan; after pt rolling to remove bedpan, placed bed flat for pt to assist with transferring to HOB pulling on the siderails and pushing with LEs; returned HOB elevated and pt c/o being "miserable" because of nausea, vomiting, 8/10 pain R upper abdomen/lower chest; informed nurse; unable to continue with therapy session.    Patient left right sidelying with all lines intact, call button in reach, bed alarm on and nurse notified..    GOALS:   Multidisciplinary Problems     Physical Therapy Goals        Problem: Physical Therapy Goal    Goal Priority Disciplines Outcome Goal Variances Interventions   Physical Therapy Goal     PT, PT/OT Ongoing (interventions implemented as appropriate)     Description:  Goals to be met by: 9/27/2018     Patient will increase functional independence with mobility by performing:    Updated 8/27/2018: David completed this date  1. Supine to sit with Modified [place]  2. Sit to supine with Modified [place]  3. Rolling to Left and Right with Modified [place].  4. Sit to stand transfer with Stand-by Assistance with AD  5. Bed to chair " transfer with Stand-by Assistance with AD  6. Gait  x  feet with Stand-by Assistance using AD.   7. Ascend/Descend 8 inch curb step with Contact Guard Assistance and Minimal Assistance using AD  8. Lower extremity exercise program x10 reps with supervision              Problem: Physical Therapy Goal    Goal Priority Disciplines Outcome Goal Variances Interventions   Physical Therapy Goal     PT, PT/OT                      Time Tracking:     PT Received On: 09/01/18  PT Start Time: 1611     PT Stop Time: 1631  PT Total Time (min): 20 min     Billable Minutes: Therapeutic Activity 20 minutes    Treatment Type: Treatment  PT/PTA: PT     PTA Visit Number: 0     Rosa Richards, PT  09/01/2018

## 2018-09-01 NOTE — PLAN OF CARE
Problem: Patient Care Overview  Goal: Plan of Care Review  Outcome: Revised  Reviewed plan of care with pt., understanding verbalized.  Will monitor pt. Throughout shift.

## 2018-09-01 NOTE — PROGRESS NOTES
[facility]  Infectious Disease  Progress Note    Patient Name: Sheryl Martines  MRN: [id number]  Admission Date: 8/22/2018  Length of Stay: 9 days  Attending Physician: Cassy Wick MD  Primary Care Provider: Primary Doctor No    Isolation Status: Special Contact  Assessment/Plan:      * Appendicitis with peritonitis    Ms Martines is post appendectomy, but there is still some drainage from the wound and she is on immunosuppressive medications which would mask signs/symptoms of peritonitis/abscess. Imaging is concerning for a RLQ fluid collection which could be hematoma/post surgical changes vs abscess, although according to surgery, more likely post op changes than fluid collection    Recs  - continue treating with flagyl which would cover for bacteroides but switch to IV instead of po which may have marginal improvement in nausea    - would not restart treatment for the Ecoli at this time since patient is afebrile with no leucocytosis but would have a low threshold to restart (ciprofloxacin) if any decline         Clostridium difficile infection    Her test results, diarrhoea and leucocytosis (may be multifactorial) are concerning for cdiff infection. Would treat as non severe cdiff, first episode. Her current nausea, constipation and abd distension, in addition to her ileus on recent imaging are concerning. Complicated CDiff can present as an ileus also     Recs  - po vancomycin for 14 days from 8/30  - would recommend Abd X ray           Thank you for your consult. I will follow-up with patient. Please contact us if you have any additional questions.    Virginia Gutierrez MD  Infectious Disease  [facility]    Subjective:     Principal Problem:Appendicitis with peritonitis    HPI: Sheryl Martines is a 63 y.o.  woman with a history of HTN, DM2, CAD, asthma, pontine stroke in 2012 with residual R sided weakness, osteoporosis. She presented to the ED on 8/22 with severe  midline lower back pain s/p fall the day before. Also noted to have AMS, pt received imaging which was concerning for appendicitis. Further imaging revealed the extent of the appendicitis. She was started on zosyn and Vanc on 8/24, had appendectomy on 8/26 op note further states the peritonitis was localized, appendix successfully removed, and region thoroughly irrigated with abx solution. Since surgery pt reports onset of intermittent nausea, decreased appetite, and diarrhea (12-16 episodes yesterday). Wound cultures from the procedure have grown anaerobes bacteroides fragilis, bacteroides thetaiotaomicron, and eggerthella lenta; aerobes E. Coli (cipro sensitive) and enterococcus raffinosus (vanc/genta sensitive). Primary team has been treating with cipro, flagyl, and vancomycin. On 8/28 pt tested positive for clostridium difficile. Pt denies any previous episodes of c.diff and cannot remember the last time she required abx.  Pt denies any recent sick contacts. She is retired and lives with her daughter with no pets. She has never been hospitalized for an infection in the past, and reports most recent hospitalization being last year for asthma exacerbation. ID was consulted for abx regimen recs.     Of note she is on chronic prednisone 20mg po qday for asthma      Interval History:     No BMs overnight, has passed flatus though. Has been vomiting    Review of Systems   Constitutional: Negative.    HENT: Negative.    Eyes: Negative.    Respiratory: Negative for cough.    Gastrointestinal: Positive for abdominal distention, abdominal pain, constipation and nausea. Negative for diarrhea.   Endocrine: Positive for cold intolerance.   Genitourinary: Negative.    Musculoskeletal: Negative.    Allergic/Immunologic: Negative.    Neurological: Negative.    Hematological: Negative.    Psychiatric/Behavioral: Negative.      Objective:     Vital Signs (Most Recent):  Temp: 98.7 °F (37.1 °C) (09/01/18 1239)  Pulse: 87 (09/01/18  1239)  Resp: 20 (09/01/18 1239)  BP: (!) 147/74 (09/01/18 1239)  SpO2: 96 % (09/01/18 1216) Vital Signs (24h Range):  Temp:  [98.3 °F (36.8 °C)-98.8 °F (37.1 °C)] 98.7 °F (37.1 °C)  Pulse:  [61-87] 87  Resp:  [16-28] 20  SpO2:  [96 %-100 %] 96 %  BP: (147-191)/(74-89) 147/74     Weight: 88 kg (194 lb 0.1 oz)  Body mass index is 36.66 kg/m².    Estimated Creatinine Clearance: 64.5 mL/min (based on SCr of 0.9 mg/dL).    Physical Exam   Constitutional: She is oriented to person, place, and time.   Neck: No JVD present.   Cardiovascular: Normal rate, regular rhythm and normal heart sounds.   Pulmonary/Chest: Effort normal and breath sounds normal. She has no wheezes. She has no rales.   Abdominal: She exhibits distension. There is tenderness. There is no rebound and no guarding.   Distended, small amount of drainage from wound site , normal bowel sounds, mainly major-incisional tenderness although there is some tenderness distant from the surgical site   Musculoskeletal: She exhibits no edema.   Neurological: She is alert and oriented to person, place, and time.   Skin: Skin is warm and dry.   Psychiatric: She has a normal mood and affect. Her behavior is normal. Thought content normal.       Significant Labs:   BMP:   Recent Labs   Lab  09/01/18   0646   GLU  263*   NA  136   K  3.3*   CL  87*   CO2  40*   BUN  15   CREATININE  0.9   CALCIUM  9.5   MG  1.4*     CBC:   Recent Labs   Lab  08/31/18   0450  09/01/18   0646   WBC  10.67  10.65   HGB  7.9*  8.2*   HCT  26.9*  27.5*   PLT  431*  519*     CMP:   Recent Labs   Lab  08/31/18 0450 09/01/18   0646   NA  137  136   K  3.8  3.3*   CL  94*  87*   CO2  36*  40*   GLU  213*  263*   BUN  14  15   CREATININE  1.0  0.9   CALCIUM  9.3  9.5   PROT  6.1  6.0   ALBUMIN  2.1*  2.2*   BILITOT  0.3  0.4   ALKPHOS  112  110   AST  27  15   ALT  18  16   ANIONGAP  7*  9   EGFRNONAA  >60  >60       Significant Imaging: I have reviewed all pertinent imaging results/findings within  the past 24 hours.

## 2018-09-01 NOTE — PLAN OF CARE
Problem: Patient Care Overview  Goal: Plan of Care Review  Outcome: Ongoing (interventions implemented as appropriate)  AAOx3. Bed remains low, locked and call bell within reach. Patient verbalized understanding to call for any needs or assistance. Bed alarm and chair alarm set and in use. PRN pain and nausea medication administered per MD orders. Patient refused to take dose of labatolol at 1700, blood pressure 184/80 manual, patient stated wanted to take home medciation instead.

## 2018-09-02 LAB
ALBUMIN SERPL BCP-MCNC: 2.3 G/DL
ALP SERPL-CCNC: 106 U/L
ALT SERPL W/O P-5'-P-CCNC: 14 U/L
ANION GAP SERPL CALC-SCNC: 10 MMOL/L
ANISOCYTOSIS BLD QL SMEAR: SLIGHT
AST SERPL-CCNC: 20 U/L
BASOPHILS # BLD AUTO: ABNORMAL K/UL
BASOPHILS NFR BLD: 0 %
BILIRUB SERPL-MCNC: 0.4 MG/DL
BUN SERPL-MCNC: 20 MG/DL
CALCIUM SERPL-MCNC: 9 MG/DL
CHLORIDE SERPL-SCNC: 84 MMOL/L
CO2 SERPL-SCNC: 40 MMOL/L
CREAT SERPL-MCNC: 1.2 MG/DL
DIFFERENTIAL METHOD: ABNORMAL
EOSINOPHIL # BLD AUTO: ABNORMAL K/UL
EOSINOPHIL NFR BLD: 0 %
ERYTHROCYTE [DISTWIDTH] IN BLOOD BY AUTOMATED COUNT: 16.1 %
EST. GFR  (AFRICAN AMERICAN): 56 ML/MIN/1.73 M^2
EST. GFR  (NON AFRICAN AMERICAN): 48 ML/MIN/1.73 M^2
GLUCOSE SERPL-MCNC: 372 MG/DL
HCT VFR BLD AUTO: 28.5 %
HGB BLD-MCNC: 8.5 G/DL
HYPOCHROMIA BLD QL SMEAR: ABNORMAL
LYMPHOCYTES # BLD AUTO: ABNORMAL K/UL
LYMPHOCYTES NFR BLD: 4 %
MAGNESIUM SERPL-MCNC: 2 MG/DL
MCH RBC QN AUTO: 23.7 PG
MCHC RBC AUTO-ENTMCNC: 29.8 G/DL
MCV RBC AUTO: 80 FL
MONOCYTES # BLD AUTO: ABNORMAL K/UL
MONOCYTES NFR BLD: 1 %
NEUTROPHILS NFR BLD: 92 %
NEUTS BAND NFR BLD MANUAL: 3 %
OVALOCYTES BLD QL SMEAR: ABNORMAL
PHOSPHATE SERPL-MCNC: 2.9 MG/DL
PLATELET # BLD AUTO: 488 K/UL
PLATELET BLD QL SMEAR: ABNORMAL
PMV BLD AUTO: 8.7 FL
POCT GLUCOSE: 225 MG/DL (ref 70–110)
POCT GLUCOSE: 249 MG/DL (ref 70–110)
POCT GLUCOSE: 306 MG/DL (ref 70–110)
POCT GLUCOSE: 360 MG/DL (ref 70–110)
POIKILOCYTOSIS BLD QL SMEAR: SLIGHT
POLYCHROMASIA BLD QL SMEAR: ABNORMAL
POTASSIUM SERPL-SCNC: 3.6 MMOL/L
PROT SERPL-MCNC: 5.8 G/DL
RBC # BLD AUTO: 3.58 M/UL
SODIUM SERPL-SCNC: 134 MMOL/L
TARGETS BLD QL SMEAR: ABNORMAL
WBC # BLD AUTO: 11.44 K/UL

## 2018-09-02 PROCEDURE — 63600175 PHARM REV CODE 636 W HCPCS: Performed by: STUDENT IN AN ORGANIZED HEALTH CARE EDUCATION/TRAINING PROGRAM

## 2018-09-02 PROCEDURE — 25000003 PHARM REV CODE 250: Performed by: OPHTHALMOLOGY

## 2018-09-02 PROCEDURE — 94761 N-INVAS EAR/PLS OXIMETRY MLT: CPT

## 2018-09-02 PROCEDURE — 25000003 PHARM REV CODE 250: Performed by: INTERNAL MEDICINE

## 2018-09-02 PROCEDURE — A4216 STERILE WATER/SALINE, 10 ML: HCPCS | Performed by: STUDENT IN AN ORGANIZED HEALTH CARE EDUCATION/TRAINING PROGRAM

## 2018-09-02 PROCEDURE — 94640 AIRWAY INHALATION TREATMENT: CPT

## 2018-09-02 PROCEDURE — 27000221 HC OXYGEN, UP TO 24 HOURS

## 2018-09-02 PROCEDURE — 11000001 HC ACUTE MED/SURG PRIVATE ROOM

## 2018-09-02 PROCEDURE — 83735 ASSAY OF MAGNESIUM: CPT

## 2018-09-02 PROCEDURE — 94799 UNLISTED PULMONARY SVC/PX: CPT

## 2018-09-02 PROCEDURE — 84100 ASSAY OF PHOSPHORUS: CPT

## 2018-09-02 PROCEDURE — 25000003 PHARM REV CODE 250: Performed by: HOSPITALIST

## 2018-09-02 PROCEDURE — 94664 DEMO&/EVAL PT USE INHALER: CPT

## 2018-09-02 PROCEDURE — S0030 INJECTION, METRONIDAZOLE: HCPCS | Performed by: INTERNAL MEDICINE

## 2018-09-02 PROCEDURE — 80053 COMPREHEN METABOLIC PANEL: CPT

## 2018-09-02 PROCEDURE — 85007 BL SMEAR W/DIFF WBC COUNT: CPT

## 2018-09-02 PROCEDURE — 25000242 PHARM REV CODE 250 ALT 637 W/ HCPCS: Performed by: STUDENT IN AN ORGANIZED HEALTH CARE EDUCATION/TRAINING PROGRAM

## 2018-09-02 PROCEDURE — 99900035 HC TECH TIME PER 15 MIN (STAT)

## 2018-09-02 PROCEDURE — 85027 COMPLETE CBC AUTOMATED: CPT

## 2018-09-02 PROCEDURE — 25000003 PHARM REV CODE 250: Performed by: STUDENT IN AN ORGANIZED HEALTH CARE EDUCATION/TRAINING PROGRAM

## 2018-09-02 RX ADMIN — DILTIAZEM HYDROCHLORIDE 180 MG: 180 CAPSULE, EXTENDED RELEASE ORAL at 10:09

## 2018-09-02 RX ADMIN — PROMETHAZINE HYDROCHLORIDE 12.5 MG: 25 INJECTION INTRAMUSCULAR; INTRAVENOUS at 02:09

## 2018-09-02 RX ADMIN — ONDANSETRON 8 MG: 8 TABLET, ORALLY DISINTEGRATING ORAL at 06:09

## 2018-09-02 RX ADMIN — TOBRAMYCIN AND DEXAMETHASONE 1 DROP: 3; 1 SUSPENSION/ DROPS OPHTHALMIC at 05:09

## 2018-09-02 RX ADMIN — IPRATROPIUM BROMIDE AND ALBUTEROL SULFATE 3 ML: .5; 3 SOLUTION RESPIRATORY (INHALATION) at 08:09

## 2018-09-02 RX ADMIN — TOBRAMYCIN AND DEXAMETHASONE 1 DROP: 3; 1 SUSPENSION/ DROPS OPHTHALMIC at 10:09

## 2018-09-02 RX ADMIN — ONDANSETRON 8 MG: 8 TABLET, ORALLY DISINTEGRATING ORAL at 09:09

## 2018-09-02 RX ADMIN — THEOPHYLLINE ANHYDROUS 200 MG: 100 CAPSULE, EXTENDED RELEASE ORAL at 10:09

## 2018-09-02 RX ADMIN — Medication 500 MG: at 11:09

## 2018-09-02 RX ADMIN — PROMETHAZINE HYDROCHLORIDE 6.25 MG: 25 INJECTION INTRAMUSCULAR; INTRAVENOUS at 08:09

## 2018-09-02 RX ADMIN — METRONIDAZOLE 500 MG: 500 INJECTION, SOLUTION INTRAVENOUS at 11:09

## 2018-09-02 RX ADMIN — FAMOTIDINE 20 MG: 20 TABLET, FILM COATED ORAL at 10:09

## 2018-09-02 RX ADMIN — TOBRAMYCIN AND DEXAMETHASONE 1 DROP: 3; 1 SUSPENSION/ DROPS OPHTHALMIC at 09:09

## 2018-09-02 RX ADMIN — PRAZOSIN HYDROCHLORIDE 5 MG: 1 CAPSULE ORAL at 10:09

## 2018-09-02 RX ADMIN — Medication 3 ML: at 09:09

## 2018-09-02 RX ADMIN — INSULIN ASPART 4 UNITS: 100 INJECTION, SOLUTION INTRAVENOUS; SUBCUTANEOUS at 05:09

## 2018-09-02 RX ADMIN — INSULIN ASPART 4 UNITS: 100 INJECTION, SOLUTION INTRAVENOUS; SUBCUTANEOUS at 12:09

## 2018-09-02 RX ADMIN — ASPIRIN 81 MG: 81 TABLET, COATED ORAL at 10:09

## 2018-09-02 RX ADMIN — PRAZOSIN HYDROCHLORIDE 5 MG: 1 CAPSULE ORAL at 09:09

## 2018-09-02 RX ADMIN — SENNOSIDES AND DOCUSATE SODIUM 1 TABLET: 8.6; 5 TABLET ORAL at 10:09

## 2018-09-02 RX ADMIN — ACETAMINOPHEN 650 MG: 325 TABLET, FILM COATED ORAL at 06:09

## 2018-09-02 RX ADMIN — IPRATROPIUM BROMIDE AND ALBUTEROL SULFATE 3 ML: .5; 3 SOLUTION RESPIRATORY (INHALATION) at 05:09

## 2018-09-02 RX ADMIN — INSULIN ASPART 4 UNITS: 100 INJECTION, SOLUTION INTRAVENOUS; SUBCUTANEOUS at 09:09

## 2018-09-02 RX ADMIN — ENOXAPARIN SODIUM 30 MG: 100 INJECTION SUBCUTANEOUS at 05:09

## 2018-09-02 RX ADMIN — SENNOSIDES AND DOCUSATE SODIUM 1 TABLET: 8.6; 5 TABLET ORAL at 09:09

## 2018-09-02 RX ADMIN — FUROSEMIDE 40 MG: 40 TABLET ORAL at 09:09

## 2018-09-02 RX ADMIN — DICLOFENAC: 10 GEL TOPICAL at 10:09

## 2018-09-02 RX ADMIN — LIDOCAINE 1 PATCH: 50 PATCH CUTANEOUS at 01:09

## 2018-09-02 RX ADMIN — Medication 3 ML: at 05:09

## 2018-09-02 RX ADMIN — Medication 10 ML: at 04:09

## 2018-09-02 RX ADMIN — Medication 500 MG: at 05:09

## 2018-09-02 RX ADMIN — FUROSEMIDE 40 MG: 40 TABLET ORAL at 10:09

## 2018-09-02 RX ADMIN — PREDNISONE 20 MG: 20 TABLET ORAL at 09:09

## 2018-09-02 RX ADMIN — IPRATROPIUM BROMIDE AND ALBUTEROL SULFATE 3 ML: .5; 3 SOLUTION RESPIRATORY (INHALATION) at 07:09

## 2018-09-02 RX ADMIN — PREDNISONE 20 MG: 20 TABLET ORAL at 10:09

## 2018-09-02 RX ADMIN — POTASSIUM CHLORIDE 40 MEQ: 1500 TABLET, EXTENDED RELEASE ORAL at 10:09

## 2018-09-02 RX ADMIN — CLOPIDOGREL BISULFATE 75 MG: 75 TABLET ORAL at 10:09

## 2018-09-02 RX ADMIN — ATORVASTATIN CALCIUM 40 MG: 40 TABLET, FILM COATED ORAL at 10:09

## 2018-09-02 RX ADMIN — METRONIDAZOLE 500 MG: 500 INJECTION, SOLUTION INTRAVENOUS at 04:09

## 2018-09-02 RX ADMIN — FAMOTIDINE 20 MG: 20 TABLET, FILM COATED ORAL at 09:09

## 2018-09-02 RX ADMIN — ACETAMINOPHEN 650 MG: 325 TABLET, FILM COATED ORAL at 11:09

## 2018-09-02 RX ADMIN — METRONIDAZOLE 500 MG: 500 INJECTION, SOLUTION INTRAVENOUS at 06:09

## 2018-09-02 RX ADMIN — LOSARTAN POTASSIUM 100 MG: 50 TABLET, FILM COATED ORAL at 10:09

## 2018-09-02 RX ADMIN — Medication 500 MG: at 12:09

## 2018-09-02 RX ADMIN — ONDANSETRON 8 MG: 8 TABLET, ORALLY DISINTEGRATING ORAL at 11:09

## 2018-09-02 RX ADMIN — TOBRAMYCIN AND DEXAMETHASONE 1 DROP: 3; 1 SUSPENSION/ DROPS OPHTHALMIC at 04:09

## 2018-09-02 NOTE — PROGRESS NOTES
[facility]  Infectious Disease  Progress Note    Patient Name: Sheryl Martines  MRN: [id number]  Admission Date: 8/22/2018  Length of Stay: 10 days  Attending Physician: Cassy Wick MD  Primary Care Provider: Primary Doctor No    Isolation Status: Special Contact  Assessment/Plan:      * Appendicitis with peritonitis    Ms Martines is post appendectomy, but there is still some drainage from the wound and she is on immunosuppressive medications which would mask signs/symptoms of peritonitis/abscess. Imaging is concerning for a RLQ fluid collection which could be hematoma/post surgical changes vs abscess, although according to surgery, more likely post op changes than fluid collection    Recs  - continue treating with flagyl which would cover for bacteroides but switch to IV instead of po which may have marginal improvement in nausea    - would not restart treatment for the Ecoli at this time since patient is afebrile with no leucocytosis but would have a low threshold to restart (ciprofloxacin) if any decline         Clostridium difficile infection    Her test results, diarrhoea and leucocytosis (may be multifactorial) are concerning for cdiff infection. Would treat as non severe cdiff, first episode. Her current nausea, constipation and abd distension, in addition to her ileus on recent imaging are concerning. Complicated CDiff can present as an ileus also     Recs  - po vancomycin for 14 days from 8/30  - continue IV metronidazole for now while still nauseated            Anticipated Disposition: unclear at this point - need to get nausea and vomiting under control    Thank you for your consult. I will follow-up with patient. Please contact us if you have any additional questions.[phone]    Vonnie Calabrese MD  Infectious Disease  [facility]    Subjective:     Principal Problem:Appendicitis with peritonitis    HPI: Sheryl Martines is a 63 y.o.  woman with a history of HTN,  DM2, CAD, asthma, pontine stroke in 2012 with residual R sided weakness, osteoporosis. She presented to the ED on 8/22 with severe midline lower back pain s/p fall the day before. Also noted to have AMS, pt received imaging which was concerning for appendicitis. Further imaging revealed the extent of the appendicitis. She was started on zosyn and Vanc on 8/24, had appendectomy on 8/26 op note further states the peritonitis was localized, appendix successfully removed, and region thoroughly irrigated with abx solution. Since surgery pt reports onset of intermittent nausea, decreased appetite, and diarrhea (12-16 episodes yesterday). Wound cultures from the procedure have grown anaerobes bacteroides fragilis, bacteroides thetaiotaomicron, and eggerthella lenta; aerobes E. Coli (cipro sensitive) and enterococcus raffinosus (vanc/genta sensitive). Primary team has been treating with cipro, flagyl, and vancomycin. On 8/28 pt tested positive for clostridium difficile. Pt denies any previous episodes of c.diff and cannot remember the last time she required abx.  Pt denies any recent sick contacts. She is retired and lives with her daughter with no pets. She has never been hospitalized for an infection in the past, and reports most recent hospitalization being last year for asthma exacerbation. ID was consulted for abx regimen recs.     Of note she is on chronic prednisone 20mg po qday for asthma      Interval History: Patient had Bm and passed gas yesterday after getting suppository. Nurse reports wound dressing with drainage yesterday. Still with nausea and vomiting and needing anti emetics. Gave ID update to daughter.     Review of Systems   Respiratory: Negative for shortness of breath.    Gastrointestinal: Positive for nausea and vomiting. Negative for diarrhea.     Antibiotics (From admission, onward)    Start     Stop Route Frequency Ordered    09/01/18 1445  metronidazole IVPB 500 mg      -- IV Every 8 hours  (non-standard times) 09/01/18 1333    08/30/18 1800  vancomycin 250mg / 10ml oral suspension 500 mg      -- Oral Every 6 hours 08/30/18 1640    08/24/18 1445  tobramycin-dexamethasone 0.3-0.1% ophthalmic suspension 1 drop      -- Both Eyes Every 4 hours while awake 08/24/18 1335        Objective:     Vital Signs (Most Recent):  Temp: 96.2 °F (35.7 °C) (09/02/18 1531)  Pulse: 82 (09/02/18 1700)  Resp: 18 (09/02/18 1700)  BP: (!) 152/76 (09/02/18 1531)  SpO2: 95 % (09/02/18 1700) Vital Signs (24h Range):  Temp:  [96.2 °F (35.7 °C)-98.2 °F (36.8 °C)] 96.2 °F (35.7 °C)  Pulse:  [] 82  Resp:  [18-20] 18  SpO2:  [95 %-100 %] 95 %  BP: (141-157)/(72-90) 152/76     Weight: 88 kg (194 lb 0.1 oz)  Body mass index is 36.66 kg/m².    Estimated Creatinine Clearance: 48.4 mL/min (based on SCr of 1.2 mg/dL).    Physical Exam   Cardiovascular: Normal heart sounds.   No murmur heard.  Pulmonary/Chest: Breath sounds normal. No respiratory distress.   Abdominal: Soft. Bowel sounds are normal. She exhibits no distension. There is tenderness.   Right lower quadrant bandage in place with some tenderness to palpation       Significant Labs:   Blood Culture:   Recent Labs   Lab  08/23/18 2041 08/27/18   0809   LABBLOO  No growth after 5 days.  No growth after 5 days.  No growth after 5 days.  No growth after 5 days.     CBC:   Recent Labs   Lab  09/01/18   0646  09/02/18   0430   WBC  10.65  11.44   HGB  8.2*  8.5*   HCT  27.5*  28.5*   PLT  519*  488*     CMP:   Recent Labs   Lab  09/01/18   0646  09/02/18   0430   NA  136  134*   K  3.3*  3.6   CL  87*  84*   CO2  40*  40*   GLU  263*  372*   BUN  15  20   CREATININE  0.9  1.2   CALCIUM  9.5  9.0   PROT  6.0  5.8*   ALBUMIN  2.2*  2.3*   BILITOT  0.4  0.4   ALKPHOS  110  106   AST  15  20   ALT  16  14   ANIONGAP  9  10   EGFRNONAA  >60  48*     Respiratory Culture:   Recent Labs   Lab  08/27/18   0752   GSRESP  <10 epithelial cells per low power field.  Few WBC's  No  organisms seen   RESPIRATORYC  Normal respiratory davdi     Urine Culture:   Recent Labs   Lab  18   0628   LABURIN  No growth     Urine Studies:   Recent Labs   Lab  18   2247   COLORU  Yellow   APPEARANCEUA  Clear   PHUR  6.0   SPECGRAV  >=1.030*   PROTEINUA  Trace*   GLUCUA  Negative   KETONESU  Negative   BILIRUBINUA  1+*   OCCULTUA  Negative   NITRITE  Negative   UROBILINOGEN  1.0   LEUKOCYTESUR  Negative     Wound Culture:   Recent Labs   Lab  18   1015   LABAERO  ESCHERICHIA COLI  Moderate    ENTEROCOCCUS RAFFINOSUS  Moderate         Significant Imagin/1 - ABD X ray - FINDINGS:  There are multiple air-filled dilated loops of small bowel measuring up to 5.5 cm in diameter consistent with obstruction versus ileus.

## 2018-09-02 NOTE — SUBJECTIVE & OBJECTIVE
Interval History: Low PO intake (did take in broth).  Spitting up. Not moving.  Difficultyl with ADLS.  Pain with adhesive around site.    Review of Systems   +nausea, spitting up, difficulty moving, abdominal pain at incision site    Objective:     Vital Signs (Most Recent):  Temp: 98.2 °F (36.8 °C) (09/02/18 1147)  Pulse: 105 (09/02/18 1200)  Resp: 20 (09/02/18 1147)  BP: (!) 151/83 (09/02/18 1147)  SpO2: 98 % (09/02/18 1253) Vital Signs (24h Range):  Temp:  [97 °F (36.1 °C)-98.2 °F (36.8 °C)] 98.2 °F (36.8 °C)  Pulse:  [] 105  Resp:  [18-20] 20  SpO2:  [97 %-100 %] 98 %  BP: (141-157)/(72-90) 151/83     Weight: 88 kg (194 lb 0.1 oz)  Body mass index is 36.66 kg/m².    Intake/Output Summary (Last 24 hours) at 9/2/2018 1414  Last data filed at 9/2/2018 0613  Gross per 24 hour   Intake 555 ml   Output 500 ml   Net 55 ml      Physical Exam   Constitutional: She is oriented to person, place, and time. She appears well-developed and well-nourished. No distress.   HENT:   Head: Normocephalic and atraumatic.   Mouth/Throat: No oropharyngeal exudate.   Eyes: Conjunctivae and EOM are normal. Pupils are equal, round, and reactive to light.   Neck: Normal range of motion. Neck supple. No JVD present.   Cardiovascular: Normal rate, regular rhythm and normal heart sounds.   No murmur heard.  Pulmonary/Chest: Effort normal. No respiratory distress. She has wheezes (mild). She has no rales.   Abdominal: She exhibits no mass. There is tenderness.   Firm, Hypoactive BS, incision with stables, clean and dry.  Old drainage on bandage   Musculoskeletal: Normal range of motion. She exhibits no edema.   Neurological: She is alert and oriented to person, place, and time. No cranial nerve deficit.   Skin: Skin is warm and dry. Capillary refill takes less than 2 seconds. She is not diaphoretic.   Psychiatric: She has a normal mood and affect. Her behavior is normal.   Nursing note and vitals reviewed.      Significant Labs:   CBC:    Recent Labs   Lab  09/01/18   0646  09/02/18   0430   WBC  10.65  11.44   HGB  8.2*  8.5*   HCT  27.5*  28.5*   PLT  519*  488*     CMP:   Recent Labs   Lab  09/01/18 0646  09/02/18   0430   NA  136  134*   K  3.3*  3.6   CL  87*  84*   CO2  40*  40*   GLU  263*  372*   BUN  15  20   CREATININE  0.9  1.2   CALCIUM  9.5  9.0   PROT  6.0  5.8*   ALBUMIN  2.2*  2.3*   BILITOT  0.4  0.4   ALKPHOS  110  106   AST  15  20   ALT  16  14   ANIONGAP  9  10   EGFRNONAA  >60  48*       Significant Imaging: I have reviewed all pertinent imaging results/findings within the past 24 hours.

## 2018-09-02 NOTE — PROGRESS NOTES
"Suegery follow up  BP (!) 141/90   Pulse 93   Temp 98 °F (36.7 °C)   Resp 18   Ht 5' 1" (1.549 m)   Wt 88 kg (194 lb 0.1 oz)   SpO2 98%   Breastfeeding? No   BMI 36.66 kg/m²   I/O last 3 completed shifts:  In: 975 [P.O.:525; IV Piggyback:450]  Out: 1850 [Urine:1850]  No intake/output data recorded.  Recent Results (from the past 336 hour(s))   CBC auto differential    Collection Time: 09/02/18  4:30 AM   Result Value Ref Range    WBC 11.44 3.90 - 12.70 K/uL    Hemoglobin 8.5 (L) 12.0 - 16.0 g/dL    Hematocrit 28.5 (L) 37.0 - 48.5 %    Platelets 488 (H) 150 - 350 K/uL   CBC auto differential    Collection Time: 09/01/18  6:46 AM   Result Value Ref Range    WBC 10.65 3.90 - 12.70 K/uL    Hemoglobin 8.2 (L) 12.0 - 16.0 g/dL    Hematocrit 27.5 (L) 37.0 - 48.5 %    Platelets 519 (H) 150 - 350 K/uL   CBC auto differential    Collection Time: 08/31/18  4:50 AM   Result Value Ref Range    WBC 10.67 3.90 - 12.70 K/uL    Hemoglobin 7.9 (L) 12.0 - 16.0 g/dL    Hematocrit 26.9 (L) 37.0 - 48.5 %    Platelets 431 (H) 150 - 350 K/uL     Recent Results (from the past 336 hour(s))   Basic metabolic panel    Collection Time: 08/24/18 12:06 PM   Result Value Ref Range    Sodium 135 (L) 136 - 145 mmol/L    Potassium 3.5 3.5 - 5.1 mmol/L    Chloride 91 (L) 95 - 110 mmol/L    CO2 32 (H) 23 - 29 mmol/L    BUN, Bld 20 8 - 23 mg/dL    Creatinine 1.7 (H) 0.5 - 1.4 mg/dL    Calcium 9.6 8.7 - 10.5 mg/dL    Anion Gap 12 8 - 16 mmol/L     Imaging Results          US Carotid Bilateral (Final result)  Result time 08/23/18 09:08:55    Final result by Marc Lemos DO (08/23/18 09:08:55)                 Impression:      Limited study by body habitus.    Allowing for limitation, 50-69% right ICA stenosis with less than 50% left ICA stenosis.    Antegrade vertebral arteries bilaterally..      Electronically signed by: Marc Lemos DO  Date:    08/23/2018  Time:    09:08             Narrative:    EXAMINATION:  US CAROTID " BILATERAL    CLINICAL HISTORY:  stroke; Altered mental status, unspecified    TECHNIQUE:  Grayscale and color Doppler ultrasound examination of the carotid and vertebral artery systems bilaterally.    COMPARISON:  None.    FINDINGS:  Results: the right common carotid artery is patent. The peak systolic velocities approximately 47 cm/sec which is within normal limits.  . The peak systolic velocities of the right proximal, mid and distal internal carotid artery are approximately 47, 78, and 178 cm/sec respectively this corresponds to an ICA/CCA ratio of approximately 3.7 which is within normal limits.  Please note evaluation limited by body habitus with difficulty penetrating within limits of the study increased velocity the concerning for 50-69% ICA stenosis.    The right vertebral artery is antegrade.    The left common carotid artery is patent. The peak systolic velocity is approximately 45 cm/sec which is within normal limits. Peak systolic velocity of the proximal, mid and distal left internal carotid arteries is approximately 38, 49, and 49 cm/sec respectively. This corresponds to an ICA/CCA ratio of approximately 1.1 which is within normal limits.    The left vertebral artery is antegrade.    Measurement of carotid stenosis is based on velocity parameters that correlate the residual internal carotid diameter with [place] [employer] symptomatic carotid endarterectomy trial (NASCET) - based stenosis levels.                               CT Head Without Contrast (Final result)  Result time 08/22/18 22:19:26    Final result by Darwin Cool MD (08/22/18 22:19:26)                 Impression:      1. Apparent punctate foci of parenchymal hypoattenuation within the bilateral thalami concerning for age indeterminate lacunar infarcts, presumably old.  Clinical correlation is recommended.      Electronically signed by: Darwin Cool MD  Date:    08/22/2018  Time:    22:19             Narrative:    EXAMINATION:  CT  HEAD WITHOUT CONTRAST    CLINICAL HISTORY:  Confusion/delirium, altered LOC, unexplained;    TECHNIQUE:  5-mm axial images were obtained through the head without the use of contrast.  Coronal and sagittal reformats were performed.    COMPARISON:  None.    FINDINGS:  Punctate foci of parenchymal hypoattenuation noted within the bilateral thalami concerning for age indeterminate lacunar infarcts, presumably old.  No CT findings to suggest an acute major vascular distribution infarct.  No intra or extra-axial hemorrhage.  No midline shift or mass effect.  No hydrocephalus.  Sellar region is unremarkable.    Paranasal sinuses and mastoid air cells are clear.  There is heterogeneity of the calvarium, possibly related to an underlying metabolic disorder.  Subcutaneous soft tissues are normal.                               X-Ray Chest 1 View (Final result)  Result time 08/22/18 22:21:33    Final result by Darwin Cool MD (08/22/18 22:21:33)                 Impression:      1. No acute radiographic findings in the chest on this single view.      Electronically signed by: Darwin Cool MD  Date:    08/22/2018  Time:    22:21             Narrative:    EXAMINATION:  XR CHEST 1 VIEW    CLINICAL HISTORY:  AMS;    TECHNIQUE:  Single frontal view of the chest was performed.    COMPARISON:  None    FINDINGS:  Partially visualized catheter projects to the right of midline, presumably over the SVC.  Spinal cord stimulator projects over the midline thorax.  Mediastinal structures are midline.  Cardiac silhouette is magnified by AP technique.  Lung volumes are symmetric.  No consolidation.  No pneumothorax or pleural effusions.  No free air beneath the diaphragm.  Degenerative changes of the shoulders noted.                            abdomen soft , passing gas and moving bowels , can start diet

## 2018-09-02 NOTE — HOSPITAL COURSE
63 y.o. 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 Admitted for stroke rule-out. CT head neg, carotid U/S neg, CTA head and neck was also neg. MRI was not performed due to patient's back implant. Neuro was consulted. EEG was performed and showed no seizures. Neurology signed off after stroke work up proved negative. On 8/24 patient developed fever 101.7F, blood cultures, urine cultures and Xray were obtained. Patient was started on abx. CT showed possible appendicitis, and surgery was consulted for appendectomy on 8/26. Patient was nirav-ntubated after OR for respiratory failure and started on precedex and propofol.  Pulm was consulted and patient was extubated on 8/27. Patient received 1 unit of PRBCs on 8/27. Her post-transfusion Hgb 8.6 and patient was transferred to floor. Patient was able to tolerated diet and remained stable. On 8/28 C. Diff panel ordered that resulted positive on 8/29.On 8/29 patient had episode of SOB and hypertensive urgency during PT/OT. Her duonebs were increased and she was placed on high flow O2 resolving her SOB. On 8/30 ID consulted for for suspected infection and C. Diff. CT abdomen on 8/31 showed possible fluid collection, IR did not suspect infection. Patient remained stable. On 9/3 PT/OT recommended placement in SNF, however family requested patient be discharged home with home health. 9/5 WBC increased, this was discussed with surgery. They requested wound cx and follow up in wound care outpatient but felt patient was stable for discharge. Patient and family were counseled of the risk of going home versus going to SNF. Patient and family were advised to increase patient activity level to prevent decconditioning at home.

## 2018-09-02 NOTE — HPI
63 y.o. 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 Most of the information was provided by the daughter who lives with the patient.     At around 3 pm the day PTA the daughter left and when she returned around 8 pm she noticed that her mother was altered and had slurred speech which was new. The daughter was worried that her mother took a percocet that she was recently prescribed for her pain or a new stroke was causing her AMS and slurred speech. The patient did not notice when her slurred speech started. The daughter also noticed that she was having trouble finding words. The daughter did not believe she fell. The patient was recently admitted on 8/14/18 for a fall and was diagnosed with compression fractures. She did not hit her head at that time. She went to a hospital in [place] and recently was given percocets to take at home. The patient was oriented to person, place, but not time. She had trouble speaking during questioning.     CT of the head showed presumed old lacunar infarcts with no acute lesions. The daughter reports she can not get an MRI due to having a back implant. The daughter notes that the patient did not complain of new focal weakness, numbness, tongue biting, or urinary incontinence. While in the ED the patient started to shake with all limbs mildly but she could converse during these shaking events and answer some questions. She denied chest pain, SOB, fevers, nausea, vomiting and headache.

## 2018-09-02 NOTE — PLAN OF CARE
Problem: Patient Care Overview  Goal: Plan of Care Review  Outcome: Ongoing (interventions implemented as appropriate)  Npo continues except for meds   Reflux type emesis of small but ongoing episodes this evening   Dulcolax suppository given  Passing flatus following  Only minor and temporary relief from gi cocktail given for burning to epigastric area  Glucometer elevated this evening   Telemetry sinus rhythm sinus tach  Continually repositions self in bed   Port a cath remains accessed   Contact isolation continued

## 2018-09-02 NOTE — PLAN OF CARE
Problem: Patient Care Overview  Goal: Plan of Care Review  Outcome: Ongoing (interventions implemented as appropriate)  Patient remains free from falls, bed alarm in use. Up to the toilet with walker and 1 assist. On telemetry NSR-ST when ambulating. Bl;ood glucose at HS-358, insulin given per SS. Contact isolation maintained for C- diff..Medicated for nausea once overnight. Remains NPO as ordered.

## 2018-09-02 NOTE — SUBJECTIVE & OBJECTIVE
Interval History: Patient had Bm and passed gas yesterday after getting suppository. Nurse reports wound dressing with drainage yesterday. Still with nausea and vomiting and needing anti emetics. Gave ID update to daughter.     Review of Systems   Respiratory: Negative for shortness of breath.    Gastrointestinal: Positive for nausea and vomiting. Negative for diarrhea.     Antibiotics (From admission, onward)    Start     Stop Route Frequency Ordered    09/01/18 1445  metronidazole IVPB 500 mg      -- IV Every 8 hours (non-standard times) 09/01/18 1333    08/30/18 1800  vancomycin 250mg / 10ml oral suspension 500 mg      -- Oral Every 6 hours 08/30/18 1640    08/24/18 1445  tobramycin-dexamethasone 0.3-0.1% ophthalmic suspension 1 drop      -- Both Eyes Every 4 hours while awake 08/24/18 1335        Objective:     Vital Signs (Most Recent):  Temp: 96.2 °F (35.7 °C) (09/02/18 1531)  Pulse: 82 (09/02/18 1700)  Resp: 18 (09/02/18 1700)  BP: (!) 152/76 (09/02/18 1531)  SpO2: 95 % (09/02/18 1700) Vital Signs (24h Range):  Temp:  [96.2 °F (35.7 °C)-98.2 °F (36.8 °C)] 96.2 °F (35.7 °C)  Pulse:  [] 82  Resp:  [18-20] 18  SpO2:  [95 %-100 %] 95 %  BP: (141-157)/(72-90) 152/76     Weight: 88 kg (194 lb 0.1 oz)  Body mass index is 36.66 kg/m².    Estimated Creatinine Clearance: 48.4 mL/min (based on SCr of 1.2 mg/dL).    Physical Exam   Cardiovascular: Normal heart sounds.   No murmur heard.  Pulmonary/Chest: Breath sounds normal. No respiratory distress.   Abdominal: Soft. Bowel sounds are normal. She exhibits no distension. There is tenderness.   Right lower quadrant bandage in place with some tenderness to palpation       Significant Labs:   Blood Culture:   Recent Labs   Lab  08/23/18 2041 08/27/18   0809   LABBLOO  No growth after 5 days.  No growth after 5 days.  No growth after 5 days.  No growth after 5 days.     CBC:   Recent Labs   Lab  09/01/18   0646  09/02/18   0430   WBC  10.65  11.44   HGB  8.2*   8.5*   HCT  27.5*  28.5*   PLT  519*  488*     CMP:   Recent Labs   Lab  18   0646  18   0430   NA  136  134*   K  3.3*  3.6   CL  87*  84*   CO2  40*  40*   GLU  263*  372*   BUN  15  20   CREATININE  0.9  1.2   CALCIUM  9.5  9.0   PROT  6.0  5.8*   ALBUMIN  2.2*  2.3*   BILITOT  0.4  0.4   ALKPHOS  110  106   AST  15  20   ALT  16  14   ANIONGAP  9  10   EGFRNONAA  >60  48*     Respiratory Culture:   Recent Labs   Lab  18   0752   GSRESP  <10 epithelial cells per low power field.  Few WBC's  No organisms seen   RESPIRATORYC  Normal respiratory david     Urine Culture:   Recent Labs   Lab  18   0628   LABURIN  No growth     Urine Studies:   Recent Labs   Lab  18   2247   COLORU  Yellow   APPEARANCEUA  Clear   PHUR  6.0   SPECGRAV  >=1.030*   PROTEINUA  Trace*   GLUCUA  Negative   KETONESU  Negative   BILIRUBINUA  1+*   OCCULTUA  Negative   NITRITE  Negative   UROBILINOGEN  1.0   LEUKOCYTESUR  Negative     Wound Culture:   Recent Labs   Lab  18   1015   LABAERO  ESCHERICHIA COLI  Moderate    ENTEROCOCCUS RAFFINOSUS  Moderate         Significant Imagin/1 - ABD X ray - FINDINGS:  There are multiple air-filled dilated loops of small bowel measuring up to 5.5 cm in diameter consistent with obstruction versus ileus.

## 2018-09-02 NOTE — ASSESSMENT & PLAN NOTE
Her test results, diarrhoea and leucocytosis (may be multifactorial) are concerning for cdiff infection. Would treat as non severe cdiff, first episode. Her current nausea, constipation and abd distension, in addition to her ileus on recent imaging are concerning. Complicated CDiff can present as an ileus also     Recs  - po vancomycin for 14 days from 8/30  - continue IV metronidazole for now while still nauseated

## 2018-09-03 LAB
ALBUMIN SERPL BCP-MCNC: 2.3 G/DL
ALP SERPL-CCNC: 100 U/L
ALT SERPL W/O P-5'-P-CCNC: 12 U/L
ANION GAP SERPL CALC-SCNC: 7 MMOL/L
AST SERPL-CCNC: 13 U/L
BASOPHILS # BLD AUTO: 0.01 K/UL
BASOPHILS NFR BLD: 0.1 %
BILIRUB SERPL-MCNC: 0.5 MG/DL
BUN SERPL-MCNC: 20 MG/DL
CALCIUM SERPL-MCNC: 8.7 MG/DL
CHLORIDE SERPL-SCNC: 88 MMOL/L
CO2 SERPL-SCNC: 40 MMOL/L
CREAT SERPL-MCNC: 1.2 MG/DL
DIFFERENTIAL METHOD: ABNORMAL
EOSINOPHIL # BLD AUTO: 0 K/UL
EOSINOPHIL NFR BLD: 0 %
ERYTHROCYTE [DISTWIDTH] IN BLOOD BY AUTOMATED COUNT: 16.8 %
EST. GFR  (AFRICAN AMERICAN): 56 ML/MIN/1.73 M^2
EST. GFR  (NON AFRICAN AMERICAN): 48 ML/MIN/1.73 M^2
GLUCOSE SERPL-MCNC: 309 MG/DL
HCT VFR BLD AUTO: 28.5 %
HGB BLD-MCNC: 8.6 G/DL
LYMPHOCYTES # BLD AUTO: 0.7 K/UL
LYMPHOCYTES NFR BLD: 6.9 %
MAGNESIUM SERPL-MCNC: 1.9 MG/DL
MCH RBC QN AUTO: 24.5 PG
MCHC RBC AUTO-ENTMCNC: 30.2 G/DL
MCV RBC AUTO: 81 FL
MONOCYTES # BLD AUTO: 0.9 K/UL
MONOCYTES NFR BLD: 8.5 %
NEUTROPHILS # BLD AUTO: 9 K/UL
NEUTROPHILS NFR BLD: 84 %
PHOSPHATE SERPL-MCNC: 3 MG/DL
PLATELET # BLD AUTO: 574 K/UL
PMV BLD AUTO: 8.9 FL
POCT GLUCOSE: 273 MG/DL (ref 70–110)
POCT GLUCOSE: 284 MG/DL (ref 70–110)
POCT GLUCOSE: 287 MG/DL (ref 70–110)
POCT GLUCOSE: 371 MG/DL (ref 70–110)
POTASSIUM SERPL-SCNC: 3.9 MMOL/L
PROT SERPL-MCNC: 5.7 G/DL
RBC # BLD AUTO: 3.51 M/UL
SODIUM SERPL-SCNC: 135 MMOL/L
WBC # BLD AUTO: 10.73 K/UL

## 2018-09-03 PROCEDURE — 25000003 PHARM REV CODE 250: Performed by: STUDENT IN AN ORGANIZED HEALTH CARE EDUCATION/TRAINING PROGRAM

## 2018-09-03 PROCEDURE — 94640 AIRWAY INHALATION TREATMENT: CPT

## 2018-09-03 PROCEDURE — 11000001 HC ACUTE MED/SURG PRIVATE ROOM

## 2018-09-03 PROCEDURE — 25000003 PHARM REV CODE 250: Performed by: HOSPITALIST

## 2018-09-03 PROCEDURE — 94761 N-INVAS EAR/PLS OXIMETRY MLT: CPT

## 2018-09-03 PROCEDURE — 27000646 HC AEROBIKA DEVICE

## 2018-09-03 PROCEDURE — G8979 MOBILITY GOAL STATUS: HCPCS | Mod: CJ | Performed by: PHYSICAL THERAPIST

## 2018-09-03 PROCEDURE — 97530 THERAPEUTIC ACTIVITIES: CPT | Performed by: PHYSICAL THERAPIST

## 2018-09-03 PROCEDURE — 27000221 HC OXYGEN, UP TO 24 HOURS

## 2018-09-03 PROCEDURE — 63600175 PHARM REV CODE 636 W HCPCS: Performed by: STUDENT IN AN ORGANIZED HEALTH CARE EDUCATION/TRAINING PROGRAM

## 2018-09-03 PROCEDURE — 83735 ASSAY OF MAGNESIUM: CPT

## 2018-09-03 PROCEDURE — 94799 UNLISTED PULMONARY SVC/PX: CPT

## 2018-09-03 PROCEDURE — 94664 DEMO&/EVAL PT USE INHALER: CPT

## 2018-09-03 PROCEDURE — 25000003 PHARM REV CODE 250: Performed by: INTERNAL MEDICINE

## 2018-09-03 PROCEDURE — 80053 COMPREHEN METABOLIC PANEL: CPT

## 2018-09-03 PROCEDURE — G8978 MOBILITY CURRENT STATUS: HCPCS | Mod: CL | Performed by: PHYSICAL THERAPIST

## 2018-09-03 PROCEDURE — S0030 INJECTION, METRONIDAZOLE: HCPCS | Performed by: INTERNAL MEDICINE

## 2018-09-03 PROCEDURE — A4216 STERILE WATER/SALINE, 10 ML: HCPCS | Performed by: STUDENT IN AN ORGANIZED HEALTH CARE EDUCATION/TRAINING PROGRAM

## 2018-09-03 PROCEDURE — 99900035 HC TECH TIME PER 15 MIN (STAT)

## 2018-09-03 PROCEDURE — 84100 ASSAY OF PHOSPHORUS: CPT

## 2018-09-03 PROCEDURE — 25000242 PHARM REV CODE 250 ALT 637 W/ HCPCS: Performed by: STUDENT IN AN ORGANIZED HEALTH CARE EDUCATION/TRAINING PROGRAM

## 2018-09-03 PROCEDURE — 85025 COMPLETE CBC W/AUTO DIFF WBC: CPT

## 2018-09-03 PROCEDURE — 97110 THERAPEUTIC EXERCISES: CPT

## 2018-09-03 PROCEDURE — 25000003 PHARM REV CODE 250: Performed by: OPHTHALMOLOGY

## 2018-09-03 RX ORDER — TRAMADOL HYDROCHLORIDE 50 MG/1
50 TABLET ORAL EVERY 6 HOURS PRN
Status: DISCONTINUED | OUTPATIENT
Start: 2018-09-03 | End: 2018-09-05 | Stop reason: HOSPADM

## 2018-09-03 RX ORDER — TRAMADOL HYDROCHLORIDE 50 MG/1
50 TABLET ORAL ONCE
Status: COMPLETED | OUTPATIENT
Start: 2018-09-03 | End: 2018-09-03

## 2018-09-03 RX ORDER — METOCLOPRAMIDE HYDROCHLORIDE 5 MG/ML
10 INJECTION INTRAMUSCULAR; INTRAVENOUS EVERY 6 HOURS
Status: DISCONTINUED | OUTPATIENT
Start: 2018-09-03 | End: 2018-09-05 | Stop reason: HOSPADM

## 2018-09-03 RX ADMIN — INSULIN ASPART 6 UNITS: 100 INJECTION, SOLUTION INTRAVENOUS; SUBCUTANEOUS at 06:09

## 2018-09-03 RX ADMIN — SENNOSIDES AND DOCUSATE SODIUM 1 TABLET: 8.6; 5 TABLET ORAL at 08:09

## 2018-09-03 RX ADMIN — LOSARTAN POTASSIUM 100 MG: 50 TABLET, FILM COATED ORAL at 09:09

## 2018-09-03 RX ADMIN — Medication 500 MG: at 11:09

## 2018-09-03 RX ADMIN — IPRATROPIUM BROMIDE AND ALBUTEROL SULFATE 3 ML: .5; 3 SOLUTION RESPIRATORY (INHALATION) at 08:09

## 2018-09-03 RX ADMIN — FAMOTIDINE 20 MG: 20 TABLET, FILM COATED ORAL at 09:09

## 2018-09-03 RX ADMIN — THEOPHYLLINE ANHYDROUS 200 MG: 100 CAPSULE, EXTENDED RELEASE ORAL at 09:09

## 2018-09-03 RX ADMIN — HYPROMELLOSE 2910 1 DROP: 5 SOLUTION OPHTHALMIC at 03:09

## 2018-09-03 RX ADMIN — INSULIN ASPART 5 UNITS: 100 INJECTION, SOLUTION INTRAVENOUS; SUBCUTANEOUS at 08:09

## 2018-09-03 RX ADMIN — CLOPIDOGREL BISULFATE 75 MG: 75 TABLET ORAL at 09:09

## 2018-09-03 RX ADMIN — PREDNISONE 20 MG: 20 TABLET ORAL at 09:09

## 2018-09-03 RX ADMIN — IPRATROPIUM BROMIDE AND ALBUTEROL SULFATE 3 ML: .5; 3 SOLUTION RESPIRATORY (INHALATION) at 12:09

## 2018-09-03 RX ADMIN — PRAZOSIN HYDROCHLORIDE 5 MG: 1 CAPSULE ORAL at 08:09

## 2018-09-03 RX ADMIN — DILTIAZEM HYDROCHLORIDE 180 MG: 180 CAPSULE, EXTENDED RELEASE ORAL at 09:09

## 2018-09-03 RX ADMIN — DICLOFENAC: 10 GEL TOPICAL at 10:09

## 2018-09-03 RX ADMIN — TOBRAMYCIN AND DEXAMETHASONE 1 DROP: 3; 1 SUSPENSION/ DROPS OPHTHALMIC at 10:09

## 2018-09-03 RX ADMIN — METOCLOPRAMIDE 10 MG: 5 INJECTION, SOLUTION INTRAMUSCULAR; INTRAVENOUS at 06:09

## 2018-09-03 RX ADMIN — TRAMADOL HYDROCHLORIDE 50 MG: 50 TABLET, COATED ORAL at 06:09

## 2018-09-03 RX ADMIN — TOBRAMYCIN AND DEXAMETHASONE 1 DROP: 3; 1 SUSPENSION/ DROPS OPHTHALMIC at 06:09

## 2018-09-03 RX ADMIN — PREDNISONE 20 MG: 20 TABLET ORAL at 08:09

## 2018-09-03 RX ADMIN — METRONIDAZOLE 500 MG: 500 INJECTION, SOLUTION INTRAVENOUS at 03:09

## 2018-09-03 RX ADMIN — PRAZOSIN HYDROCHLORIDE 5 MG: 1 CAPSULE ORAL at 09:09

## 2018-09-03 RX ADMIN — FAMOTIDINE 20 MG: 20 TABLET, FILM COATED ORAL at 08:09

## 2018-09-03 RX ADMIN — POTASSIUM CHLORIDE 40 MEQ: 1500 TABLET, EXTENDED RELEASE ORAL at 09:09

## 2018-09-03 RX ADMIN — METRONIDAZOLE 500 MG: 500 INJECTION, SOLUTION INTRAVENOUS at 06:09

## 2018-09-03 RX ADMIN — Medication 500 MG: at 03:09

## 2018-09-03 RX ADMIN — Medication 3 ML: at 03:09

## 2018-09-03 RX ADMIN — IPRATROPIUM BROMIDE AND ALBUTEROL SULFATE 3 ML: .5; 3 SOLUTION RESPIRATORY (INHALATION) at 04:09

## 2018-09-03 RX ADMIN — LIDOCAINE 1 PATCH: 50 PATCH CUTANEOUS at 02:09

## 2018-09-03 RX ADMIN — SENNOSIDES AND DOCUSATE SODIUM 1 TABLET: 8.6; 5 TABLET ORAL at 09:09

## 2018-09-03 RX ADMIN — Medication 500 MG: at 06:09

## 2018-09-03 RX ADMIN — ASPIRIN 81 MG: 81 TABLET, COATED ORAL at 09:09

## 2018-09-03 RX ADMIN — FUROSEMIDE 40 MG: 40 TABLET ORAL at 08:09

## 2018-09-03 RX ADMIN — Medication 500 MG: at 05:09

## 2018-09-03 RX ADMIN — ENOXAPARIN SODIUM 30 MG: 100 INJECTION SUBCUTANEOUS at 05:09

## 2018-09-03 RX ADMIN — METOCLOPRAMIDE 10 MG: 5 INJECTION, SOLUTION INTRAMUSCULAR; INTRAVENOUS at 11:09

## 2018-09-03 RX ADMIN — PROMETHAZINE HYDROCHLORIDE 12.5 MG: 25 INJECTION INTRAMUSCULAR; INTRAVENOUS at 09:09

## 2018-09-03 RX ADMIN — METRONIDAZOLE 500 MG: 500 INJECTION, SOLUTION INTRAVENOUS at 11:09

## 2018-09-03 RX ADMIN — TOBRAMYCIN AND DEXAMETHASONE 1 DROP: 3; 1 SUSPENSION/ DROPS OPHTHALMIC at 09:09

## 2018-09-03 RX ADMIN — Medication 3 ML: at 10:09

## 2018-09-03 RX ADMIN — ATORVASTATIN CALCIUM 40 MG: 40 TABLET, FILM COATED ORAL at 09:09

## 2018-09-03 RX ADMIN — TRAMADOL HYDROCHLORIDE 50 MG: 50 TABLET, COATED ORAL at 08:09

## 2018-09-03 RX ADMIN — TOBRAMYCIN AND DEXAMETHASONE 1 DROP: 3; 1 SUSPENSION/ DROPS OPHTHALMIC at 03:09

## 2018-09-03 RX ADMIN — FUROSEMIDE 40 MG: 40 TABLET ORAL at 09:09

## 2018-09-03 NOTE — PT/OT/SLP PROGRESS
Occupational Therapy   Treatment    Name: Sheryl Martines  MRN: [id number]  Admitting Diagnosis:  Appendicitis with peritonitis  8 Days Post-Op    Recommendations:     Discharge Recommendations: nursing facility, skilled  Discharge Equipment Recommendations:  walker, rolling  Barriers to discharge:  Inaccessible home environment, Decreased caregiver support    Subjective     Communicated with: Arlette espinoza prior to session.  Pain/Comfort:  · Pain Rating 1: 7/10  · Location - Side 1: Right  · Location - Orientation 1: upper  · Location 1: abdomen  · Pain Addressed 1: Cessation of Activity    Patients cultural, spiritual, [employer]ar[employer] conflicts given the current situation: (none reported)    Objective:     Patient found with: bed alarm, oxygen, telemetry    General Precautions: Standard, fall, respiratory   Orthopedic Precautions:N/A   Braces: N/A     Patient left HOB elevated with all lines intact, call button in reach, bed alarm on and RN notified    [facility] 6 Click:  [facility] Total Score: 16    Treatment & Education:  Issued min resistance theraband - able to complete with (A). Reported RC pain in L sh. Patient educated on importance of BUE strengthening to improve functional transfers.   Education:    Assessment:   Patient required increased (A) to stand from chair with PT this date. Issued min resistance theraband and completed HEP with (A) to improve BUE strength and endurance for functional transfers. Recommend SNF upon D/C 2* patient requiring increased (A) for transfers.     Sheryl Martines is a 63 y.o. female with a medical diagnosis of Appendicitis with peritonitis.Performance deficits affecting function are weakness, impaired endurance, impaired self care skills, impaired functional mobilty, gait instability, impaired balance, decreased upper extremity function, decreased lower extremity function, pain, impaired skin, decreased ROM, impaired cardiopulmonary response to activity.      Rehab Prognosis:  Good;  patient would benefit from acute skilled OT services to address these deficits and reach maximum level of function.       Plan:     Patient to be seen 5 x/week to address the above listed problems via self-care/home management, therapeutic activities, therapeutic exercises  · Plan of Care Expires: 09/27/18  · Plan of Care Reviewed with: patient    This Plan of care has been discussed with the patient who was involved in its development and understands and is in agreement with the identified goals and treatment plan    GOALS:   Multidisciplinary Problems     Occupational Therapy Goals        Problem: Occupational Therapy Goal    Goal Priority Disciplines Outcome Interventions   Occupational Therapy Goal     OT, PT/OT Ongoing (interventions implemented as appropriate)    Description:  Goals to be met by: 8/31/2018    Patient will increase functional independence with ADLs by performing:    Feeding with Modified [place].  UE Dressing with Set-up Assistance.  LE Dressing with Minimal Assistance.  Grooming while seated with Set-up Assistance. --MET 8/31  Toileting from bedside commode with Minimal Assistance for hygiene and clothing management.   Sitting at edge of bed x20 minutes with Supervision. --MET 8/31  Rolling to Bilateral with Modified [place].   Supine to sit with Modified [place].  Stand pivot transfers with Stand-by Assistance.  Step transfer with Supervision and Stand-by Assistance  Toilet transfer to bedside commode with Stand-by Assistance.  Increased functional strength to WFL for BUE.  Upper extremity exercise program 10 reps per handout, with supervision.                       Time Tracking:     OT Date of Treatment: 09/03/18  OT Start Time: 1401  OT Stop Time: 1416  OT Total Time (min): 15 min    Billable Minutes:Therapeutic Exercise 15    MANOJ Beebe  9/3/2018

## 2018-09-03 NOTE — PROGRESS NOTES
Progress Note  [facility]  Admit Date: 8/22/2018   LOS: 11 days   SUBJECTIVE:   Follow-up For:    Patient seen and examined this pm.    ROS, eating , no fever , no nausea or vomiting , passing gas and moving bowels    OBJECTIVE:   Vital Signs (Most Recent)  Temp: 96.6 °F (35.9 °C) (09/03/18 1156)  Pulse: 103 (09/03/18 1223)  Resp: 16 (09/03/18 1208)  BP: (!) 148/70 (09/03/18 1156)  SpO2: 100 % (09/03/18 1208)    I & O (Last 24H):    Intake/Output Summary (Last 24 hours) at 9/3/2018 1537  Last data filed at 9/3/2018 0930  Gross per 24 hour   Intake 420 ml   Output 400 ml   Net 20 ml     Wt Readings from Last 3 Encounters:   08/29/18 88 kg (194 lb 0.1 oz)   08/14/18 81.6 kg (180 lb)       Current Diet Order   Procedures    Diet clear liquid        Physical Exam      Laboratory Data:  CBC  Recent Labs   Lab  09/01/18   0646  09/02/18   0430  09/03/18   0430   WBC  10.65  11.44  10.73   RBC  3.46*  3.58*  3.51*   HGB  8.2*  8.5*  8.6*   HCT  27.5*  28.5*  28.5*   PLT  519*  488*  574*   MCV  80*  80*  81*   MCH  23.7*  23.7*  24.5*   MCHC  29.8*  29.8*  30.2*     CMP  Recent Labs   Lab  09/01/18   0646  09/02/18   0430  09/03/18   0430   CALCIUM  9.5  9.0  8.7   PROT  6.0  5.8*  5.7*   NA  136  134*  135*   K  3.3*  3.6  3.9   CO2  40*  40*  40*   CL  87*  84*  88*   BUN  15  20  20   CREATININE  0.9  1.2  1.2   ALKPHOS  110  106  100   ALT  16  14  12   AST  15  20  13   BILITOT  0.4  0.4  0.5     POCT-Glucose  POCT Glucose   Date Value Ref Range Status   09/03/2018 273 (H) 70 - 110 mg/dL Final   09/03/2018 287 (H) 70 - 110 mg/dL Final   09/02/2018 306 (H) 70 - 110 mg/dL Final   09/02/2018 249 (H) 70 - 110 mg/dL Final   09/02/2018 225 (H) 70 - 110 mg/dL Final   09/02/2018 360 (H) 70 - 110 mg/dL Final   09/01/2018 348 (H) 70 - 110 mg/dL Final   09/01/2018 309 (H) 70 - 110 mg/dL Final   09/01/2018 179 (H) 70 - 110 mg/dL Final   09/01/2018 282 (H) 70 - 110 mg/dL Final   08/31/2018 319 (H) 70 - 110 mg/dL Final    08/31/2018 249 (H) 70 - 110 mg/dL Final     COAGS  No results for input(s): PT, INR, APTT in the last 168 hours.  UA  No results for input(s): COLORU, CLARITYU, SPECGRAV, PHUR, PROTEINUA, GLUCOSEU, BLOODU, WBCU, RBCU, BACTERIA, MUCUS in the last 24 hours.    Invalid input(s):  BILIRUBINCON  MICRO  Microbiology Results (last 7 days)     Procedure Component Value Units Date/Time    Blood culture [[id number]] Collected:  08/27/18 0809    Order Status:  Completed Specimen:  Blood Updated:  09/01/18 1212     Blood Culture, Routine No growth after 5 days.    Blood culture [[id number]] Collected:  08/27/18 0809    Order Status:  Completed Specimen:  Blood Updated:  09/01/18 1212     Blood Culture, Routine No growth after 5 days.    Culture, Anaerobe [[id number]] Collected:  08/26/18 1015    Order Status:  Completed Specimen:  Wound from Abdomen Updated:  08/31/18 1008     Anaerobic Culture --     EGGERTHELLA LENTA  Moderate       Anaerobic Culture --     BACTEROIDES FRAGILIS  Moderate       Anaerobic Culture --     BACTEROIDES THETAIOTAOMICRON  Moderate      Narrative:       APPENDIX    Culture, Respiratory with Gram Stain [[id number]] Collected:  08/27/18 0752    Order Status:  Completed Specimen:  Respiratory from Endotracheal Aspirate Updated:  08/29/18 1138     Respiratory Culture Normal respiratory david     Gram Stain (Respiratory) <10 epithelial cells per low power field.     Gram Stain (Respiratory) Few WBC's     Gram Stain (Respiratory) No organisms seen    Blood culture [[id number]] Collected:  08/23/18 2041    Order Status:  Completed Specimen:  Blood Updated:  08/29/18 0612     Blood Culture, Routine No growth after 5 days.    Blood culture [[id number]] Collected:  08/23/18 2041    Order Status:  Completed Specimen:  Blood Updated:  08/29/18 0612     Blood Culture, Routine No growth after 5 days.    C Diff Toxin by PCR [[id number]]  (Abnormal) Collected:  08/28/18 1834    Order Status:  Completed Updated:  08/29/18  0348     C. diff PCR Positive    Clostridium difficile EIA [[id number]]  (Abnormal) Collected:  08/28/18 1834    Order Status:  Completed Specimen:  Stool Updated:  08/28/18 2128     C. diff Antigen Positive     C difficile Toxins A+B, EIA Negative     Comment: Testing not recommended for children <24 months old.       Aerobic culture [[id number]]  (Susceptibility) Collected:  08/26/18 1015    Order Status:  Completed Specimen:  Wound from Abdomen Updated:  08/28/18 1443     Aerobic Bacterial Culture --     ESCHERICHIA COLI  Moderate       Aerobic Bacterial Culture --     ENTEROCOCCUS RAFFINOSUS  Moderate      Narrative:       APPENDIX        LIPID PANEL  Lab Results   Component Value Date    CHOL 159 08/23/2018     Lab Results   Component Value Date    HDL 35 (L) 08/23/2018     Lab Results   Component Value Date    LDLCALC 86.6 08/23/2018     Lab Results   Component Value Date    TRIG 187 (H) 08/23/2018     Lab Results   Component Value Date    CHOLHDL 22.0 08/23/2018       Diagnostic Results:  Imaging in last 24 hours:  Imaging Results          US Carotid Bilateral (Final result)  Result time 08/23/18 09:08:55    Final result by Marc Lemos DO (08/23/18 09:08:55)                 Impression:      Limited study by body habitus.    Allowing for limitation, 50-69% right ICA stenosis with less than 50% left ICA stenosis.    Antegrade vertebral arteries bilaterally..      Electronically signed by: Marc Lemos DO  Date:    08/23/2018  Time:    09:08             Narrative:    EXAMINATION:  US CAROTID BILATERAL    CLINICAL HISTORY:  stroke; Altered mental status, unspecified    TECHNIQUE:  Grayscale and color Doppler ultrasound examination of the carotid and vertebral artery systems bilaterally.    COMPARISON:  None.    FINDINGS:  Results: the right common carotid artery is patent. The peak systolic velocities approximately 47 cm/sec which is within normal limits.  . The peak systolic velocities of the right proximal,  mid and distal internal carotid artery are approximately 47, 78, and 178 cm/sec respectively this corresponds to an ICA/CCA ratio of approximately 3.7 which is within normal limits.  Please note evaluation limited by body habitus with difficulty penetrating within limits of the study increased velocity the concerning for 50-69% ICA stenosis.    The right vertebral artery is antegrade.    The left common carotid artery is patent. The peak systolic velocity is approximately 45 cm/sec which is within normal limits. Peak systolic velocity of the proximal, mid and distal left internal carotid arteries is approximately 38, 49, and 49 cm/sec respectively. This corresponds to an ICA/CCA ratio of approximately 1.1 which is within normal limits.    The left vertebral artery is antegrade.    Measurement of carotid stenosis is based on velocity parameters that correlate the residual internal carotid diameter with [place] symptomatic carotid endarterectomy trial (NASCET) - based stenosis levels.                               CT Head Without Contrast (Final result)  Result time 08/22/18 22:19:26    Final result by Darwin Cool MD (08/22/18 22:19:26)                 Impression:      1. Apparent punctate foci of parenchymal hypoattenuation within the bilateral thalami concerning for age indeterminate lacunar infarcts, presumably old.  Clinical correlation is recommended.      Electronically signed by: Darwin Cool MD  Date:    08/22/2018  Time:    22:19             Narrative:    EXAMINATION:  CT HEAD WITHOUT CONTRAST    CLINICAL HISTORY:  Confusion/delirium, altered LOC, unexplained;    TECHNIQUE:  5-mm axial images were obtained through the head without the use of contrast.  Coronal and sagittal reformats were performed.    COMPARISON:  None.    FINDINGS:  Punctate foci of parenchymal hypoattenuation noted within the bilateral thalami concerning for age indeterminate lacunar infarcts, presumably old.  No CT  findings to suggest an acute major vascular distribution infarct.  No intra or extra-axial hemorrhage.  No midline shift or mass effect.  No hydrocephalus.  Sellar region is unremarkable.    Paranasal sinuses and mastoid air cells are clear.  There is heterogeneity of the calvarium, possibly related to an underlying metabolic disorder.  Subcutaneous soft tissues are normal.                               X-Ray Chest 1 View (Final result)  Result time 08/22/18 22:21:33    Final result by Darwin Cool MD (08/22/18 22:21:33)                 Impression:      1. No acute radiographic findings in the chest on this single view.      Electronically signed by: Darwin Cool MD  Date:    08/22/2018  Time:    22:21             Narrative:    EXAMINATION:  XR CHEST 1 VIEW    CLINICAL HISTORY:  AMS;    TECHNIQUE:  Single frontal view of the chest was performed.    COMPARISON:  None    FINDINGS:  Partially visualized catheter projects to the right of midline, presumably over the SVC.  Spinal cord stimulator projects over the midline thorax.  Mediastinal structures are midline.  Cardiac silhouette is magnified by AP technique.  Lung volumes are symmetric.  No consolidation.  No pneumothorax or pleural effusions.  No free air beneath the diaphragm.  Degenerative changes of the shoulders noted.                                ASSESSMENT/PLAN:   Sheyrl Martines is a 63 y.o. female, s/p appendectomy with paralytic ileus   Continue present treatment.surgically stable.    9/3/2018 Bernadine Melendrez MD  3:37 PM

## 2018-09-03 NOTE — PROGRESS NOTES
PGY-2 Progress Note   [facility]      Chief Complaint   Patient presents with    Altered Mental Status     To ER with EMS stating that the family called for altered mental status but was awake and alert when they arrived.  pt with slurred speech and has slept alot today, taking pain medication for an injury one week ago.     Hospital Stay Day 10    Subjective: Afebrile VSS, reports of pt vomiting unwitnessed by nursing staff.  Complaints of abdominal pain.  ID and surgery on board.  NPO due to nausea.    Scheduled Meds:   albuterol-ipratropium  3 mL Nebulization Q4H WAKE    aspirin  81 mg Oral Daily    atorvastatin  40 mg Oral Daily    clopidogrel  75 mg Oral Daily    diclofenac sodium   Topical (Top) Daily    diltiaZEM  180 mg Oral Daily    enoxaparin  30 mg Subcutaneous Daily    famotidine  20 mg Oral BID    furosemide  40 mg Oral BID    insulin detemir U-100  5 Units Subcutaneous BID    lidocaine  1 patch Transdermal Q24H    losartan  100 mg Oral Daily    metoclopramide HCl  10 mg Intravenous Q6H    metronidazole  500 mg Intravenous Q8H    potassium chloride  40 mEq Oral Daily    prazosin  5 mg Oral BID    predniSONE  20 mg Per OG tube BID    senna-docusate 8.6-50 mg  1 tablet Oral BID    sodium chloride 0.9%  3 mL Intravenous Q8H    theophylline  200 mg Oral Daily    tobramycin-dexamethasone 0.3-0.1%  1 drop Both Eyes Q4H While awake    vancomycin  500 mg Oral Q6H     Continuous Infusions:  PRN Meds:acetaminophen, albuterol, artificial tears, bisacodyl, dextrose 50%, diphenhydrAMINE, glucagon (human recombinant), glucose, glucose, insulin aspart U-100, labetalol, lidocaine-prilocaine, ondansetron, promethazine (PHENERGAN) IVPB, simethicone, traMADol    Review of patient's allergies indicates:   Allergen Reactions    Ace inhibitors Swelling    Corticosteroids (glucocorticoids)     Hydralazine analogues     Tetracyclines Swelling    Travatan (with benzalkonium) [travoprost (benzalkonium)]         Objectives:       Vitals([id number])  Temp:  98.8  Pulse: 80's-105  Resp:  16-20  BP:141-179/ 72-90  SpO2:  % on 2 L NC    I & O([id number])    Intake/Output Summary  Gross per 24 hour   Intake 470ml   Output 975ml   Net -2743 ml     General: AAOx3. NAD.   HEENT: NCAT. PERRLA  Neck: Supple. No JVD. No LAD.    CV: RRR, no murmur   Chest: Minimal wheezing intermittently at lung bases.     Abd: Soft. ND, TTP at surgical site.bandage in place  No rebound or guarding.   Ext: no edema appreciated  Skin: Intact. No rash. No lesions.   Neuro: CN II-XII intact. No focal deficit.   Psych: Good judgement and reason. NL affect. No abnormal behaviors noted    LABS  CBC  Lab  09/01/18   0646  09/02/18   0430   WBC  10.65  11.44   RBC  3.46*  3.58*   HGB  8.2*  8.5*   HCT  27.5*  28.5*   PLT  519*  488*   MCV  80*  80*   MCH  23.7*  23.7*   MCHC  29.8*  29.8*     BMP  Lab  09/01/18   0646  09/02/18   0430   NA  136  134*   K  3.3*  3.6   CO2  40*  40*   CL  87*  84*   BUN  15  20   CREATININE  0.9  1.2   GLU  263*  372*       POCT-Glucose  POCT Glucose   Date Value Ref Range Status   09/02/2018 225 (H) 70 - 110 mg/dL Final   09/02/2018 360 (H) 70 - 110 mg/dL Final   09/01/2018 348 (H) 70 - 110 mg/dL Final   09/01/2018 309 (H) 70 - 110 mg/dL Final   09/01/2018 179 (H) 70 - 110 mg/dL Final   09/01/2018 282 (H) 70 - 110 mg/dL Final   08/31/2018 319 (H) 70 - 110 mg/dL Final       Lab  09/01/18   0646  09/02/18   0430   CALCIUM  9.5  9.0   MG  1.4*  2.0   PHOS  2.5*  2.9     LFT  Lab  09/01/18   0646  09/02/18   0430   PROT  6.0  5.8*   ALBUMIN  2.2*  2.3*   BILITOT  0.4  0.4   AST  15  20   ALKPHOS  110  106   ALT  16  14     LAST HbA1c  Lab Results   Component Value Date    HGBA1C 8.1 (H) 08/23/2018     Imaging- no new imaging      Assessment/Plan:   63 y.o. 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  Patient went to OR Sunday AM for  appendectomy.        Appendiceal Abscess  - Blood cultures x 2 - Negative   - POD#6 for open appendectomy   - Abx- PO flagyl   - Appendix culture- positive for E coli sensitive to cipro.   - ID consult: continue oral flagyl for bacteroides infection.   - CTA with possible fluid collection in abdomen  ID recommended IR consult for possible drainage, but IR does not think that drainage is necessary.     C. Diff Infection  - Positive c diff toxin by PCR and c diff EIA  - Per ID: continue PO vancomycin for 10 days    NIKHIL  - BUN/Crea of 26/2.5 on admit  - BUN/Crea stable  - continue to monitor     CVA    CT head is negative for acute hemorrhagic event  Anti-platelet therapy: ASA 81/Plavix 75mg   Atorvastatin 40mg PO daily  Q4 nuero checks  PT/OT/ST  Fall precautions  Drug screen pos for opiates   EEG performed- encephalopathy thoguht to be likely due to appendicits  CTA head and neck Unremarkable and without evidence for proximal significant stenosis or occlusion.     HTN  Clonidine .1mg PO BID  Lasix 40mg PO BID  Diltiazem 180mg ER PO daily      DM2  -Patient on moderate dose SSI     CAD  - Continue ASA and plavix and Atorvastatin      Elevated troponin- resolving   - 0.273-->0.267--> 0.233--> .098 --> resolved     PT/OT: ordered and following  Code: full  Diet: full liquid, advance as tolerated   Ppx: dvt: lovenox     Dispo: follow up surgery, ID recs     Case discussed with staff    Julian Carmichael  [facility] PGY-2  09/03/2018

## 2018-09-03 NOTE — SUBJECTIVE & OBJECTIVE
Interval History:     No events, on metronidazole, pred, vanc    Review of Systems   Constitutional: Positive for fatigue. Negative for chills and fever.   HENT: Negative for sinus pressure and sinus pain.    Respiratory: Negative for cough and shortness of breath.    Gastrointestinal: Positive for constipation, nausea and vomiting.   Genitourinary: Negative for dysuria.   Musculoskeletal: Negative.    Allergic/Immunologic: Negative.    Neurological: Negative.    Hematological: Negative.    Psychiatric/Behavioral: Negative.      Objective:     Vital Signs (Most Recent):  Temp: 96.6 °F (35.9 °C) (09/03/18 1156)  Pulse: 103 (09/03/18 1223)  Resp: 16 (09/03/18 1208)  BP: (!) 148/70 (09/03/18 1156)  SpO2: 100 % (09/03/18 1208) Vital Signs (24h Range):  Temp:  [96.2 °F (35.7 °C)-98.2 °F (36.8 °C)] 96.6 °F (35.9 °C)  Pulse:  [] 103  Resp:  [16-20] 16  SpO2:  [95 %-100 %] 100 %  BP: (146-157)/(64-83) 148/70     Weight: 88 kg (194 lb 0.1 oz)  Body mass index is 36.66 kg/m².    Estimated Creatinine Clearance: 48.4 mL/min (based on SCr of 1.2 mg/dL).    Physical Exam    Significant Labs:   BMP:   Recent Labs   Lab  09/03/18 0430   GLU  309*   NA  135*   K  3.9   CL  88*   CO2  40*   BUN  20   CREATININE  1.2   CALCIUM  8.7   MG  1.9     CBC:   Recent Labs   Lab  09/02/18 0430 09/03/18 0430   WBC  11.44  10.73   HGB  8.5*  8.6*   HCT  28.5*  28.5*   PLT  488*  574*     CMP:   Recent Labs   Lab  09/02/18 0430 09/03/18 0430   NA  134*  135*   K  3.6  3.9   CL  84*  88*   CO2  40*  40*   GLU  372*  309*   BUN  20  20   CREATININE  1.2  1.2   CALCIUM  9.0  8.7   PROT  5.8*  5.7*   ALBUMIN  2.3*  2.3*   BILITOT  0.4  0.5   ALKPHOS  106  100   AST  20  13   ALT  14  12   ANIONGAP  10  7*   EGFRNONAA  48*  48*       Significant Imaging: I have reviewed all pertinent imaging results/findings within the past 24 hours.

## 2018-09-03 NOTE — ASSESSMENT & PLAN NOTE
Ms Martines is post appendectomy, but there is still some drainage from the wound and she is on immunosuppressive medications which would mask signs/symptoms of peritonitis/abscess. Imaging is concerning for a RLQ fluid collection which could be hematoma/post surgical changes vs abscess, although according to surgery, more likely post op changes than fluid collection    Recs  - continue treating with iv flagyl which would cover for bacteroides and have less effect on nausea    - would not restart treatment for the Ecoli at this time since patient is afebrile with no leucocytosis but would have a low threshold to restart (ciprofloxacin) if any decline

## 2018-09-03 NOTE — PLAN OF CARE
Problem: Patient Care Overview  Goal: Plan of Care Review  Outcome: Ongoing (interventions implemented as appropriate)  Pt continues to expectorate small volumes of reflux emesis as well as a large emesis following clear liquid lunch    Sinus rhythm with occasional pac and occasional pvc on telemetry   Pain continues to lower right abd   Small liquid stool early in shift  Large volume at end of shift  Pt refused dulcolax suppository offered at shift end    Out of bed in chair for over 2 hours this morning   Dressing changed  Sero sang drainage continues on dry dressing   Replaced dressing with xeroform , 4x4 and abd tolerated well  dermabond site rlq healed  Staples to umbilical site without any drainage

## 2018-09-03 NOTE — PROGRESS NOTES
[facility]  Infectious Disease  Progress Note    Patient Name: Sheryl Martines  MRN: [id number]  Admission Date: 8/22/2018  Length of Stay: 11 days  Attending Physician: Cassy Wick MD  Primary Care Provider: Primary Doctor No    Isolation Status: Special Contact  Assessment/Plan:      * Appendicitis with peritonitis    Ms Martines is post appendectomy, but there is still some drainage from the wound and she is on immunosuppressive medications which would mask signs/symptoms of peritonitis/abscess. Imaging is concerning for a RLQ fluid collection which could be hematoma/post surgical changes vs abscess, although according to surgery, more likely post op changes than fluid collection    Recs  - continue treating with iv flagyl which would cover for bacteroides and have less effect on nausea    - would not restart treatment for the Ecoli at this time since patient is afebrile with no leucocytosis but would have a low threshold to restart (ciprofloxacin) if any decline           Thank you for your consult. I will follow-up with patient. Please contact us if you have any additional questions.    Virginia Gutierrez MD  Infectious Disease  [facility]    Subjective:     Principal Problem:Appendicitis with peritonitis    HPI: Sheryl Martines is a 63 y.o.  woman with a history of HTN, DM2, CAD, asthma, pontine stroke in 2012 with residual R sided weakness, osteoporosis. She presented to the ED on 8/22 with severe midline lower back pain s/p fall the day before. Also noted to have AMS, pt received imaging which was concerning for appendicitis. Further imaging revealed the extent of the appendicitis. She was started on zosyn and Vanc on 8/24, had appendectomy on 8/26 op note further states the peritonitis was localized, appendix successfully removed, and region thoroughly irrigated with abx solution. Since surgery pt reports onset of intermittent nausea, decreased appetite, and diarrhea  (12-16 episodes yesterday). Wound cultures from the procedure have grown anaerobes bacteroides fragilis, bacteroides thetaiotaomicron, and eggerthella lenta; aerobes E. Coli (cipro sensitive) and enterococcus raffinosus (vanc/genta sensitive). Primary team has been treating with cipro, flagyl, and vancomycin. On 8/28 pt tested positive for clostridium difficile. Pt denies any previous episodes of c.diff and cannot remember the last time she required abx.  Pt denies any recent sick contacts. She is retired and lives with her daughter with no pets. She has never been hospitalized for an infection in the past, and reports most recent hospitalization being last year for asthma exacerbation. ID was consulted for abx regimen recs.     Of note she is on chronic prednisone 20mg po qday for asthma      Interval History:     No events, on metronidazole, pred, vanc    Review of Systems   Constitutional: Positive for fatigue. Negative for chills and fever.   HENT: Negative for sinus pressure and sinus pain.    Respiratory: Negative for cough and shortness of breath.    Gastrointestinal: Positive for constipation, nausea and vomiting.   Genitourinary: Negative for dysuria.   Musculoskeletal: Negative.    Allergic/Immunologic: Negative.    Neurological: Negative.    Hematological: Negative.    Psychiatric/Behavioral: Negative.      Objective:     Vital Signs (Most Recent):  Temp: 96.6 °F (35.9 °C) (09/03/18 1156)  Pulse: 103 (09/03/18 1223)  Resp: 16 (09/03/18 1208)  BP: (!) 148/70 (09/03/18 1156)  SpO2: 100 % (09/03/18 1208) Vital Signs (24h Range):  Temp:  [96.2 °F (35.7 °C)-98.2 °F (36.8 °C)] 96.6 °F (35.9 °C)  Pulse:  [] 103  Resp:  [16-20] 16  SpO2:  [95 %-100 %] 100 %  BP: (146-157)/(64-83) 148/70     Weight: 88 kg (194 lb 0.1 oz)  Body mass index is 36.66 kg/m².    Estimated Creatinine Clearance: 48.4 mL/min (based on SCr of 1.2 mg/dL).    Physical Exam    Significant Labs:   BMP:   Recent Labs   Lab  09/03/18   0161    GLU  309*   NA  135*   K  3.9   CL  88*   CO2  40*   BUN  20   CREATININE  1.2   CALCIUM  8.7   MG  1.9     CBC:   Recent Labs   Lab  09/02/18 0430 09/03/18 0430   WBC  11.44  10.73   HGB  8.5*  8.6*   HCT  28.5*  28.5*   PLT  488*  574*     CMP:   Recent Labs   Lab  09/02/18 0430 09/03/18 0430   NA  134*  135*   K  3.6  3.9   CL  84*  88*   CO2  40*  40*   GLU  372*  309*   BUN  20  20   CREATININE  1.2  1.2   CALCIUM  9.0  8.7   PROT  5.8*  5.7*   ALBUMIN  2.3*  2.3*   BILITOT  0.4  0.5   ALKPHOS  106  100   AST  20  13   ALT  14  12   ANIONGAP  10  7*   EGFRNONAA  48*  48*       Significant Imaging: I have reviewed all pertinent imaging results/findings within the past 24 hours.

## 2018-09-03 NOTE — PT/OT/SLP PROGRESS
Physical Therapy Treatment    Patient Name:  Sheryl Martines   MRN:  [id number]    Recommendations:     Discharge Recommendations:  nursing facility, skilled   Discharge Equipment Recommendations: walker, rolling   Barriers to discharge: Inaccessible home and Decreased caregiver support    Assessment:     Sheryl Martines is a 63 y.o. female admitted with a medical diagnosis of Appendicitis with peritonitis.  She presents with the following impairments/functional limitations:  weakness, impaired endurance, impaired self care skills, impaired functional mobilty, gait instability, impaired balance, decreased lower extremity function, impaired cardiopulmonary response to activity.  Pt with poor tolerance to sitting, requiring assist of 2 to get out of chair today.    Rehab Prognosis:  Fair; patient would benefit from acute skilled PT services to address these deficits and reach maximum level of function.      Recent Surgery: Procedure(s) (LRB):  APPENDECTOMY, LAPAROSCOPIC---CONVERTED TO OPEN APPENDECTOMY @0950 (N/A)  APPENDECTOMY (N/A) 8 Days Post-Op    Plan:     During this hospitalization, patient to be seen 6 x/week to address the above listed problems via gait training, therapeutic activities, therapeutic exercises, neuromuscular re-education, wheelchair management/training  · Plan of Care Expires:  10/03/18   Plan of Care Reviewed with: patient    Subjective     Communicated with nurse prior to session.  Patient found sitting in b/s chair upon PT entry to room, agreeable to treatment.      Chief Complaint: wants to go to the bathroom  Patient comments/goals: wants to go to the bathroom  Pain/Comfort:  · Pain Rating 1: 8/10  · Location 1: abdomen  · Pain Addressed 1: Nurse notified, Reposition, Cessation of Activity, Distraction  · Pain Rating Post-Intervention 1: 7/10    Patients cultural, spiritual, Pentecostalism conflicts given the current situation: none reported    Objective:     Patient found with:       General  Precautions: Standard, fall, respiratory   Orthopedic Precautions:N/A   Braces:       Functional Mobility:  · Bed Mobility:     · Rolling Left:  contact guard assistance  · Rolling Right: contact guard assistance  · Supine to Sit: minimum assistance  · Sit to Supine: minimum assistance  · Transfers:     · Sit to Stand:  maximal assistance and of 2 persons with rolling walker  · Bed to Chair: maximal assistance with  rolling walker and 2 people  using  Stand Pivot  · Gait: Pt ambulated 4' with RW with Min assist x 2 with vc for safety step all the way back to the bed.  · Balance: F+ dynamic standing balance      AM-PAC 6 CLICK MOBILITY  Turning over in bed (including adjusting bedclothes, sheets and blankets)?: 3  Sitting down on and standing up from a chair with arms (e.g., wheelchair, bedside commode, etc.): 3  Moving from lying on back to sitting on the side of the bed?: 3  Moving to and from a bed to a chair (including a wheelchair)?: 2  Need to walk in hospital room?: 2  Climbing 3-5 steps with a railing?: 1  Basic Mobility Total Score: 14       Therapeutic Activities and Exercises:   Pt unable to go sit to stand with RW with Max assist of 1 from chair.  Pt required max assist of 2 to scoot to HOB.    Patient left supine with all lines intact, call button in reach and Nursing assistant present..    GOALS:   Multidisciplinary Problems     Physical Therapy Goals        Problem: Physical Therapy Goal    Goal Priority Disciplines Outcome Goal Variances Interventions   Physical Therapy Goal     PT, PT/OT Ongoing (interventions implemented as appropriate)     Description:  Goals to be met by: 9/27/2018     Patient will increase functional independence with mobility by performing:    Updated 8/27/2018: David completed this date  1. Supine to sit with Modified [place]  2. Sit to supine with Modified [place]  3. Rolling to Left and Right with Modified [place].  4. Sit to stand transfer with Stand-by  Assistance with AD  5. Bed to chair transfer with Stand-by Assistance with AD  6. Gait  x  feet with Stand-by Assistance using AD.   7. Ascend/Descend 8 inch curb step with Contact Guard Assistance and Minimal Assistance using AD  8. Lower extremity exercise program x10 reps with supervision              Problem: Physical Therapy Goal    Goal Priority Disciplines Outcome Goal Variances Interventions   Physical Therapy Goal     PT, PT/OT             Problem: Physical Therapy Goal    Goal Priority Disciplines Outcome Goal Variances Interventions   Physical Therapy Goal     PT, PT/OT                      Time Tracking:     PT Received On: 09/03/18  PT Start Time: 1036     PT Stop Time: 1111  PT Total Time (min): 35 min     Billable Minutes: Therapeutic Activity 35             PTA Visit Number: 0     Elsa Nunez, PT  09/03/2018

## 2018-09-03 NOTE — PLAN OF CARE
Problem: Physical Therapy Goal  Goal: Physical Therapy Goal  Goals to be met by: 9/27/2018     Patient will increase functional independence with mobility by performing:    Updated 8/27/2018: Mona-Mariangel completed this date  1. Supine to sit with Modified [place]  2. Sit to supine with Modified [place]  3. Rolling to Left and Right with Modified [place].  4. Sit to stand transfer with Stand-by Assistance with AD  5. Bed to chair transfer with Stand-by Assistance with AD  6. Gait  x  feet with Stand-by Assistance using AD.   7. Ascend/Descend 8 inch curb step with Contact Guard Assistance and Minimal Assistance using AD  8. Lower extremity exercise program x10 reps with supervision      Outcome: Ongoing (interventions implemented as appropriate)  Pt would benefit from continued PT to progress functional mobility.

## 2018-09-03 NOTE — PROGRESS NOTES
"Patient complains of RLQ abdominal pain 9/10 requests the Tramadol not plain Tylenol because it "doesn't relieve her pain". Notified  On call for Dr. Wick and he said he would put it in.  "

## 2018-09-03 NOTE — PROGRESS NOTES
[facility] Hospital Medicine  Progress Note    Patient Name: Sheryl Martines  MRN: [id number]  Patient Class: IP- Inpatient   Admission Date: 8/22/2018  Length of Stay: 11 days  Attending Physician: Cassy Wick MD  Primary Care Provider: Primary Doctor No        Subjective:     Principal Problem:Appendicitis with peritonitis    HPI:  63 y.o. 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 Most of the information was provided by the daughter who lives with the patient.     At around 3 pm the day PTA the daughter left and when she returned around 8 pm she noticed that her mother was altered and had slurred speech which was new. The daughter was worried that her mother took a percocet that she was recently prescribed for her pain or a new stroke was causing her AMS and slurred speech. The patient did not notice when her slurred speech started. The daughter also noticed that she was having trouble finding words. The daughter did not believe she fell. The patient was recently admitted on 8/14/18 for a fall and was diagnosed with compression fractures. She did not hit her head at that time. She went to a hospital in [place] and recently was given percocets to take at home. The patient was oriented to person, place, but not time. She had trouble speaking during questioning.     CT of the head showed presumed old lacunar infarcts with no acute lesions. The daughter reports she can not get an MRI due to having a back implant. The daughter notes that the patient did not complain of new focal weakness, numbness, tongue biting, or urinary incontinence. While in the ED the patient started to shake with all limbs mildly but she could converse during these shaking events and answer some questions. She denied chest pain, SOB, fevers, nausea, vomiting and headache.       Interval History: Low PO intake (did take in  broth).  Spitting up. Not moving.  Difficultyl with ADLS.  Pain with adhesive around site.    Review of Systems   +nausea, spitting up, difficulty moving, abdominal pain at incision site    Objective:     Vital Signs (Most Recent):  Temp: 98.2 °F (36.8 °C) (09/02/18 1147)  Pulse: 105 (09/02/18 1200)  Resp: 20 (09/02/18 1147)  BP: (!) 151/83 (09/02/18 1147)  SpO2: 98 % (09/02/18 1253) Vital Signs (24h Range):  Temp:  [97 °F (36.1 °C)-98.2 °F (36.8 °C)] 98.2 °F (36.8 °C)  Pulse:  [] 105  Resp:  [18-20] 20  SpO2:  [97 %-100 %] 98 %  BP: (141-157)/(72-90) 151/83     Weight: 88 kg (194 lb 0.1 oz)  Body mass index is 36.66 kg/m².    Intake/Output Summary (Last 24 hours) at 9/2/2018 1414  Last data filed at 9/2/2018 0613  Gross per 24 hour   Intake 555 ml   Output 500 ml   Net 55 ml      Physical Exam   Constitutional: She is oriented to person, place, and time.   HENT:   Head: Normocephalic and atraumatic.   Mouth/Throat: No oropharyngeal exudate.   Eyes: Conjunctivae and EOM are normal. Pupils are equal, round, and reactive to light.   Neck: Normal range of motion. Neck supple. No JVD present.   Cardiovascular: Normal rate, regular rhythm and normal heart sounds.   No murmur heard.  Pulmonary/Chest: Effort normal. No respiratory distress. She has wheezes (mild). She has no rales.   Abdominal: She exhibits no mass. There is tenderness.   Firm, Hypoactive BS, incision with stables, clean and dry.  Old drainage on bandage   Musculoskeletal: Normal range of motion. She exhibits no edema.   Neurological: She is alert and oriented to person, place, and time. No cranial nerve deficit.   Skin: Skin is warm and dry. Capillary refill takes less than 2 seconds. She is not diaphoretic.   Psychiatric: discouraged but states she has no problem sitting up in chair.  Nursing note and vitals reviewed.      Significant Labs:   CBC:   Recent Labs   Lab  09/01/18   0646  09/02/18   0430   WBC  10.65  11.44   HGB  8.2*  8.5*   HCT   27.5*  28.5*   PLT  519*  488*     CMP:   Recent Labs   Lab  09/01/18   0646  09/02/18   0430   NA  136  134*   K  3.3*  3.6   CL  87*  84*   CO2  40*  40*   GLU  263*  372*   BUN  15  20   CREATININE  0.9  1.2   CALCIUM  9.5  9.0   PROT  6.0  5.8*   ALBUMIN  2.2*  2.3*   BILITOT  0.4  0.4   ALKPHOS  110  106   AST  15  20   ALT  16  14   ANIONGAP  9  10   EGFRNONAA  >60  48*       Significant Imaging: I have reviewed all pertinent imaging results/findings within the past 24 hours.    Assessment/Plan:      63 y.o. 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  Subsequently with C Diff     S/p Appy  - Blood cultures x 2 - Negative   - POD#8 for open appendectomy   - Abx- flagyl   - Surgical site dressing changed yesterday by surgery  - Appendix culture- positive for E coli sensitive to cipro. Received 2 days of IV cipro.   - ID consult: continue IV flagyl for bacteroides infection with vanc for C diff.    - No abscess per IR and surgery     C. Diff Infection  - Positive c diff toxin by PCR and c diff EIA  - Per ID: continue PO vancomycin for 10 days     NIKHIL (resolved)  - BUN/Crea of 26/2.5 on admit  - BUN/Crea stable     CVA    CT head is negative for acute hemorrhagic event  Anti-platelet therapy: ASA 81/Plavix 75mg   Atorvastatin 40mg PO daily  Q4 nuero checks  PT/OT/ST  Fall precautions  Drug screen pos for opiates    EEG performed- encephalopathy - subsequently had Appy   CTA head and neck Unremarkable and without evidence for proximal significant stenosis or occlusion.     HTN  Clonidine .1mg PO BID  Lasix 40mg PO BID  Diltiazem 180mg ER PO daily      DM2  -Patient on moderate dose SSI     CAD  - Continue ASA and plavix and Atorvastatin      Elevated troponin- resolving   - 0.273-->0.267--> 0.233--> .098 --> resolved     PT/OT: ordered and following  Code: full  Diet: full  liquid, advance as tolerated   Ppx: dvt: lovenox      Dispo: follow up surgery, ID recs     Case discussed with staff        VTE Risk Mitigation (From admission, onward)        Ordered     enoxaparin injection 30 mg  Daily      08/27/18 0843     Place sequential compression device  Until discontinued      08/23/18 0103     IP VTE HIGH RISK PATIENT  Once      08/23/18 0103              Julian Carmichael MD  Department of Hospital Medicine   [facility]

## 2018-09-04 PROBLEM — K35.33 APPENDICITIS WITH PERITONITIS: Status: RESOLVED | Noted: 2018-08-26 | Resolved: 2018-09-04

## 2018-09-04 PROBLEM — R41.82 ALTERED MENTAL STATUS: Status: RESOLVED | Noted: 2018-08-23 | Resolved: 2018-09-04

## 2018-09-04 PROBLEM — R47.81 SLURRED SPEECH: Status: RESOLVED | Noted: 2018-08-23 | Resolved: 2018-09-04

## 2018-09-04 LAB
ALBUMIN SERPL BCP-MCNC: 2.3 G/DL
ALP SERPL-CCNC: 95 U/L
ALT SERPL W/O P-5'-P-CCNC: 12 U/L
ANION GAP SERPL CALC-SCNC: 9 MMOL/L
ANISOCYTOSIS BLD QL SMEAR: SLIGHT
AST SERPL-CCNC: 13 U/L
BASOPHILS # BLD AUTO: 0.01 K/UL
BASOPHILS NFR BLD: 0.1 %
BILIRUB SERPL-MCNC: 0.4 MG/DL
BUN SERPL-MCNC: 19 MG/DL
CALCIUM SERPL-MCNC: 8.5 MG/DL
CHLORIDE SERPL-SCNC: 92 MMOL/L
CO2 SERPL-SCNC: 33 MMOL/L
CREAT SERPL-MCNC: 1 MG/DL
DIFFERENTIAL METHOD: ABNORMAL
EOSINOPHIL # BLD AUTO: 0 K/UL
EOSINOPHIL NFR BLD: 0 %
ERYTHROCYTE [DISTWIDTH] IN BLOOD BY AUTOMATED COUNT: 16.9 %
EST. GFR  (AFRICAN AMERICAN): >60 ML/MIN/1.73 M^2
EST. GFR  (NON AFRICAN AMERICAN): >60 ML/MIN/1.73 M^2
GLUCOSE SERPL-MCNC: 343 MG/DL
HCT VFR BLD AUTO: 29.7 %
HGB BLD-MCNC: 8.7 G/DL
HYPOCHROMIA BLD QL SMEAR: ABNORMAL
LYMPHOCYTES # BLD AUTO: 0.8 K/UL
LYMPHOCYTES NFR BLD: 7.1 %
MAGNESIUM SERPL-MCNC: 1.9 MG/DL
MCH RBC QN AUTO: 23.9 PG
MCHC RBC AUTO-ENTMCNC: 29.3 G/DL
MCV RBC AUTO: 82 FL
MONOCYTES # BLD AUTO: 0.8 K/UL
MONOCYTES NFR BLD: 7.8 %
NEUTROPHILS # BLD AUTO: 9.1 K/UL
NEUTROPHILS NFR BLD: 85 %
PHOSPHATE SERPL-MCNC: 3.2 MG/DL
PLATELET # BLD AUTO: 516 K/UL
PLATELET BLD QL SMEAR: ABNORMAL
PMV BLD AUTO: 9.1 FL
POCT GLUCOSE: 252 MG/DL (ref 70–110)
POCT GLUCOSE: 309 MG/DL (ref 70–110)
POCT GLUCOSE: 317 MG/DL (ref 70–110)
POTASSIUM SERPL-SCNC: 3.9 MMOL/L
PROT SERPL-MCNC: 5.7 G/DL
RBC # BLD AUTO: 3.64 M/UL
SODIUM SERPL-SCNC: 134 MMOL/L
WBC # BLD AUTO: 10.78 K/UL

## 2018-09-04 PROCEDURE — 94664 DEMO&/EVAL PT USE INHALER: CPT

## 2018-09-04 PROCEDURE — 63600175 PHARM REV CODE 636 W HCPCS: Performed by: STUDENT IN AN ORGANIZED HEALTH CARE EDUCATION/TRAINING PROGRAM

## 2018-09-04 PROCEDURE — 25000003 PHARM REV CODE 250: Performed by: STUDENT IN AN ORGANIZED HEALTH CARE EDUCATION/TRAINING PROGRAM

## 2018-09-04 PROCEDURE — 94761 N-INVAS EAR/PLS OXIMETRY MLT: CPT

## 2018-09-04 PROCEDURE — 84100 ASSAY OF PHOSPHORUS: CPT

## 2018-09-04 PROCEDURE — 97530 THERAPEUTIC ACTIVITIES: CPT

## 2018-09-04 PROCEDURE — 97110 THERAPEUTIC EXERCISES: CPT

## 2018-09-04 PROCEDURE — S0030 INJECTION, METRONIDAZOLE: HCPCS | Performed by: INTERNAL MEDICINE

## 2018-09-04 PROCEDURE — 25000003 PHARM REV CODE 250: Performed by: INTERNAL MEDICINE

## 2018-09-04 PROCEDURE — 99900035 HC TECH TIME PER 15 MIN (STAT)

## 2018-09-04 PROCEDURE — 94640 AIRWAY INHALATION TREATMENT: CPT

## 2018-09-04 PROCEDURE — 25000003 PHARM REV CODE 250: Performed by: HOSPITALIST

## 2018-09-04 PROCEDURE — 85025 COMPLETE CBC W/AUTO DIFF WBC: CPT

## 2018-09-04 PROCEDURE — 83735 ASSAY OF MAGNESIUM: CPT

## 2018-09-04 PROCEDURE — 25000242 PHARM REV CODE 250 ALT 637 W/ HCPCS: Performed by: STUDENT IN AN ORGANIZED HEALTH CARE EDUCATION/TRAINING PROGRAM

## 2018-09-04 PROCEDURE — 11000001 HC ACUTE MED/SURG PRIVATE ROOM

## 2018-09-04 PROCEDURE — 94799 UNLISTED PULMONARY SVC/PX: CPT

## 2018-09-04 PROCEDURE — 25000003 PHARM REV CODE 250: Performed by: OPHTHALMOLOGY

## 2018-09-04 PROCEDURE — 97535 SELF CARE MNGMENT TRAINING: CPT

## 2018-09-04 PROCEDURE — A4216 STERILE WATER/SALINE, 10 ML: HCPCS | Performed by: STUDENT IN AN ORGANIZED HEALTH CARE EDUCATION/TRAINING PROGRAM

## 2018-09-04 PROCEDURE — 63600175 PHARM REV CODE 636 W HCPCS: Performed by: FAMILY MEDICINE

## 2018-09-04 PROCEDURE — 80053 COMPREHEN METABOLIC PANEL: CPT

## 2018-09-04 RX ORDER — METRONIDAZOLE 500 MG/1
500 TABLET ORAL EVERY 8 HOURS
Qty: 28 TABLET | Refills: 0 | Status: SHIPPED | OUTPATIENT
Start: 2018-09-04 | End: 2018-09-15

## 2018-09-04 RX ORDER — METRONIDAZOLE 500 MG/1
500 TABLET ORAL EVERY 8 HOURS
Qty: 28 TABLET | Refills: 0 | Status: SHIPPED | OUTPATIENT
Start: 2018-09-04 | End: 2018-09-04

## 2018-09-04 RX ORDER — ASPIRIN 81 MG/1
81 TABLET ORAL DAILY
Qty: 30 TABLET | Refills: 11 | Status: SHIPPED | OUTPATIENT
Start: 2018-09-05 | End: 2019-09-05

## 2018-09-04 RX ORDER — ONDANSETRON 4 MG/1
4 TABLET, FILM COATED ORAL EVERY 6 HOURS PRN
Qty: 30 TABLET | Refills: 1 | Status: SHIPPED | OUTPATIENT
Start: 2018-09-04 | End: 2018-09-04 | Stop reason: SDUPTHER

## 2018-09-04 RX ORDER — ONDANSETRON 4 MG/1
4 TABLET, FILM COATED ORAL EVERY 6 HOURS PRN
Qty: 30 TABLET | Refills: 1 | Status: SHIPPED | OUTPATIENT
Start: 2018-09-04 | End: 2019-08-20

## 2018-09-04 RX ORDER — ENOXAPARIN SODIUM 100 MG/ML
40 INJECTION SUBCUTANEOUS EVERY 24 HOURS
Status: DISCONTINUED | OUTPATIENT
Start: 2018-09-04 | End: 2018-09-05 | Stop reason: HOSPADM

## 2018-09-04 RX ORDER — ONDANSETRON 4 MG/1
4 TABLET, FILM COATED ORAL EVERY 6 HOURS PRN
Qty: 30 TABLET | Refills: 1 | OUTPATIENT
Start: 2018-09-04 | End: 2018-09-04 | Stop reason: SDUPTHER

## 2018-09-04 RX ORDER — ATORVASTATIN CALCIUM 40 MG/1
40 TABLET, FILM COATED ORAL DAILY
Qty: 90 TABLET | Refills: 3 | Status: CANCELLED | OUTPATIENT
Start: 2018-09-05 | End: 2019-09-05

## 2018-09-04 RX ORDER — ASPIRIN 81 MG/1
81 TABLET ORAL DAILY
Qty: 30 TABLET | Refills: 11 | OUTPATIENT
Start: 2018-09-05 | End: 2018-09-04

## 2018-09-04 RX ORDER — METRONIDAZOLE 500 MG/1
500 TABLET ORAL EVERY 8 HOURS
Status: DISCONTINUED | OUTPATIENT
Start: 2018-09-04 | End: 2018-09-05 | Stop reason: HOSPADM

## 2018-09-04 RX ADMIN — TOBRAMYCIN AND DEXAMETHASONE 1 DROP: 3; 1 SUSPENSION/ DROPS OPHTHALMIC at 03:09

## 2018-09-04 RX ADMIN — PRAZOSIN HYDROCHLORIDE 5 MG: 1 CAPSULE ORAL at 09:09

## 2018-09-04 RX ADMIN — IPRATROPIUM BROMIDE AND ALBUTEROL SULFATE 3 ML: .5; 3 SOLUTION RESPIRATORY (INHALATION) at 07:09

## 2018-09-04 RX ADMIN — DILTIAZEM HYDROCHLORIDE 180 MG: 180 CAPSULE, EXTENDED RELEASE ORAL at 09:09

## 2018-09-04 RX ADMIN — PREDNISONE 20 MG: 20 TABLET ORAL at 09:09

## 2018-09-04 RX ADMIN — TRAMADOL HYDROCHLORIDE 50 MG: 50 TABLET, COATED ORAL at 09:09

## 2018-09-04 RX ADMIN — IPRATROPIUM BROMIDE AND ALBUTEROL SULFATE 3 ML: .5; 3 SOLUTION RESPIRATORY (INHALATION) at 11:09

## 2018-09-04 RX ADMIN — LOSARTAN POTASSIUM 100 MG: 50 TABLET, FILM COATED ORAL at 09:09

## 2018-09-04 RX ADMIN — INSULIN ASPART 6 UNITS: 100 INJECTION, SOLUTION INTRAVENOUS; SUBCUTANEOUS at 12:09

## 2018-09-04 RX ADMIN — METRONIDAZOLE 500 MG: 500 TABLET ORAL at 03:09

## 2018-09-04 RX ADMIN — INSULIN ASPART 4 UNITS: 100 INJECTION, SOLUTION INTRAVENOUS; SUBCUTANEOUS at 09:09

## 2018-09-04 RX ADMIN — TOBRAMYCIN AND DEXAMETHASONE 1 DROP: 3; 1 SUSPENSION/ DROPS OPHTHALMIC at 09:09

## 2018-09-04 RX ADMIN — POTASSIUM CHLORIDE 40 MEQ: 1500 TABLET, EXTENDED RELEASE ORAL at 09:09

## 2018-09-04 RX ADMIN — LIDOCAINE AND PRILOCAINE: 25; 25 CREAM TOPICAL at 09:09

## 2018-09-04 RX ADMIN — CLOPIDOGREL BISULFATE 75 MG: 75 TABLET ORAL at 09:09

## 2018-09-04 RX ADMIN — TOBRAMYCIN AND DEXAMETHASONE 1 DROP: 3; 1 SUSPENSION/ DROPS OPHTHALMIC at 05:09

## 2018-09-04 RX ADMIN — ATORVASTATIN CALCIUM 40 MG: 40 TABLET, FILM COATED ORAL at 09:09

## 2018-09-04 RX ADMIN — SENNOSIDES AND DOCUSATE SODIUM 1 TABLET: 8.6; 5 TABLET ORAL at 09:09

## 2018-09-04 RX ADMIN — INSULIN ASPART 6 UNITS: 100 INJECTION, SOLUTION INTRAVENOUS; SUBCUTANEOUS at 05:09

## 2018-09-04 RX ADMIN — Medication 3 ML: at 05:09

## 2018-09-04 RX ADMIN — FAMOTIDINE 20 MG: 20 TABLET, FILM COATED ORAL at 09:09

## 2018-09-04 RX ADMIN — METOCLOPRAMIDE 10 MG: 5 INJECTION, SOLUTION INTRAMUSCULAR; INTRAVENOUS at 05:09

## 2018-09-04 RX ADMIN — Medication 500 MG: at 05:09

## 2018-09-04 RX ADMIN — FUROSEMIDE 40 MG: 40 TABLET ORAL at 09:09

## 2018-09-04 RX ADMIN — Medication 500 MG: at 12:09

## 2018-09-04 RX ADMIN — ENOXAPARIN SODIUM 40 MG: 100 INJECTION SUBCUTANEOUS at 05:09

## 2018-09-04 RX ADMIN — METRONIDAZOLE 500 MG: 500 INJECTION, SOLUTION INTRAVENOUS at 05:09

## 2018-09-04 RX ADMIN — TOBRAMYCIN AND DEXAMETHASONE 1 DROP: 3; 1 SUSPENSION/ DROPS OPHTHALMIC at 10:09

## 2018-09-04 RX ADMIN — ASPIRIN 81 MG: 81 TABLET, COATED ORAL at 09:09

## 2018-09-04 RX ADMIN — THEOPHYLLINE ANHYDROUS 200 MG: 100 CAPSULE, EXTENDED RELEASE ORAL at 09:09

## 2018-09-04 RX ADMIN — INSULIN ASPART 8 UNITS: 100 INJECTION, SOLUTION INTRAVENOUS; SUBCUTANEOUS at 06:09

## 2018-09-04 RX ADMIN — SODIUM PHOSPHATE, DIBASIC AND SODIUM PHOSPHATE, MONOBASIC 1 ENEMA: 7; 19 ENEMA RECTAL at 03:09

## 2018-09-04 RX ADMIN — METOCLOPRAMIDE 10 MG: 5 INJECTION, SOLUTION INTRAMUSCULAR; INTRAVENOUS at 12:09

## 2018-09-04 NOTE — PT/OT/SLP PROGRESS
Physical Therapy Treatment    Patient Name:  Sheryl Martines   MRN:  [id number]    Recommendations:     Discharge Recommendations:  nursing facility, skilled   Discharge Equipment Recommendations: walker, rolling, shower chair   Barriers to discharge: decreased mobility,strength and endurance    Assessment:     Sheryl Martines is a 63 y.o. female admitted with a medical diagnosis of Appendicitis with peritonitis.  She presents with the following impairments/functional limitations:  weakness, impaired endurance, impaired functional mobilty, gait instability, impaired balance, decreased lower extremity function, decreased safety awareness, pain, impaired coordination, decreased ROM, impaired skin pt requires increased time with bed mobility and increased assistance with bed-chair t/f,pt fearful of falling with RW and pt's daughter works,pt will benefit from SNF upon discharge.    Rehab Prognosis:  Good; patient would benefit from acute skilled PT services to address these deficits and reach maximum level of function.      Recent Surgery: Procedure(s) (LRB):  APPENDECTOMY, LAPAROSCOPIC---CONVERTED TO OPEN APPENDECTOMY @0950 (N/A)  APPENDECTOMY (N/A) 9 Days Post-Op    Plan:     During this hospitalization, patient to be seen 6 x/week to address the above listed problems via gait training, therapeutic activities, therapeutic exercises, wheelchair management/training  · Plan of Care Expires:  10/03/18   Plan of Care Reviewed with: patient    Subjective     Communicated with nsg prior to session.  Patient found supine upon PT entry to room, agreeable to treatment.      Chief Complaint: n/a  Patient comments/goals: pt states she feels like she can go home  Pain/Comfort:  · Pain Rating 1: (no rating)  · Location 1: abdomen  · Pain Addressed 1: Reposition, Distraction    Patients cultural, spiritual, Mandaen conflicts given the current situation: none reported    Objective:     Patient found with: bed alarm, telemetry      General Precautions: Standard, fall   Orthopedic Precautions:N/A   Braces: N/A     Functional Mobility:  · Bed Mobility:     · Supine to Sit: stand by assistance and increased time required  · Transfers:     · Sit to Stand:  minimum assistance and moderate assistance with rolling walker  · Bed to Chair: moderate assistance and maximal assistance with  rolling walker  using  Step Transfer  · Gait: 2-3 turning steps with RW to chair and Mod/Max A with fear of falling  · Balance: fair sitting balance      AM-PAC 6 CLICK MOBILITY  Turning over in bed (including adjusting bedclothes, sheets and blankets)?: 3  Sitting down on and standing up from a chair with arms (e.g., wheelchair, bedside commode, etc.): 3  Moving from lying on back to sitting on the side of the bed?: 3  Moving to and from a bed to a chair (including a wheelchair)?: 2  Need to walk in hospital room?: 1  Climbing 3-5 steps with a railing?: 1  Basic Mobility Total Score: 13       Therapeutic Activities and Exercises: le supine ex's X 10-12 reps inc: ap,qs,hs,abd/add,slr       Patient left up in chair with all lines intact, call button in reach, chair alarm on and nsg notified..    GOALS: see general POC  Multidisciplinary Problems     Physical Therapy Goals        Problem: Physical Therapy Goal    Goal Priority Disciplines Outcome Goal Variances Interventions   Physical Therapy Goal     PT, PT/OT Ongoing (interventions implemented as appropriate)     Description:  Goals to be met by: 9/27/2018     Patient will increase functional independence with mobility by performing:    Updated 8/27/2018: Mona-Mariangel completed this date  1. Supine to sit with Modified [place]  2. Sit to supine with Modified [place]  3. Rolling to Left and Right with Modified [place].  4. Sit to stand transfer with Stand-by Assistance with AD  5. Bed to chair transfer with Stand-by Assistance with AD  6. Gait  x  feet with Stand-by Assistance using AD.   7.  Ascend/Descend 8 inch curb step with Contact Guard Assistance and Minimal Assistance using AD  8. Lower extremity exercise program x10 reps with supervision              Problem: Physical Therapy Goal    Goal Priority Disciplines Outcome Goal Variances Interventions   Physical Therapy Goal     PT, PT/OT             Problem: Physical Therapy Goal    Goal Priority Disciplines Outcome Goal Variances Interventions   Physical Therapy Goal     PT, PT/OT                      Time Tracking:     PT Received On: 09/04/18  PT Start Time: 1027     PT Stop Time: 1054  PT Total Time (min): 27 min     Billable Minutes: Therapeutic Activity 16 and Therapeutic Exercise 11    Treatment Type: Treatment  PT/PTA: PTA     PTA Visit Number: 1     Paul Sung, PTA  09/04/2018

## 2018-09-04 NOTE — PLAN OF CARE
Problem: Patient Care Overview  Goal: Plan of Care Review  Outcome: Ongoing (interventions implemented as appropriate)  No sob   Will cont to monitor

## 2018-09-04 NOTE — PT/OT/SLP PROGRESS
Occupational Therapy   Treatment    Name: Sheryl Martines  MRN: [id number]  Admitting Diagnosis:  Appendicitis with peritonitis  9 Days Post-Op    Recommendations:     Discharge Recommendations: nursing facility, skilled  Discharge Equipment Recommendations:  walker, rolling, shower chair  Barriers to discharge:  Decreased caregiver support, Inaccessible home environment    Subjective     Communicated with: nsg prior to session.  Pain/Comfort:  · Pain Rating 1: 2/10  · Location - Side 1: Right  · Location 1: abdomen  · Pain Addressed 1: Reposition, Distraction, Cessation of Activity  · Pain Rating 2: (did not rate)    Patients cultural, spiritual, [employer]aritan conflicts given the current situation: (none reported)    Objective:     Patient found with: RT in room, telemetry, bed alarm, peripheral IV    General Precautions: Standard, fall, respiratory   Orthopedic Precautions:N/A   Braces: N/A     Occupational Performance:    Bed Mobility:    · Patient completed Rolling/Turning to Left with  modified independence  · Patient completed Scooting/Bridging with minimum assistance and moderate assistance  · Patient completed Sit to Supine with supervision     Functional Mobility/Transfers:  · Patient completed Sit <> Stand Transfer with minimum assistance and moderate assistance  with  rolling walker   · Patient completed BSC > Bed Transfer using Stand Pivot technique with minimum assistance with rolling walker  · Patient completed Toilet Transfer Stand Pivot technique with minimum assistance with  rolling walker  Functional Mobility: Pt with Fair + static seated balance and Fair to Fair- dynamic standing balance.    Activities of Daily Living:  · Grooming: supervision seated on bedside chair  · Upper Body Dressing: contact guard assistance seated EOB  · Lower Body Dressing: contact guard assistance to don/doff B socks seated bedside chair  · Toileting: moderate assistance for hygiene on BSC    Patient left HOB elevated with  all lines intact, call button in reach, bed alarm on, nsg notified and nsg present    [facility] 6 Click:  [facility] Total Score: 16    Treatment & Education:  Pt performing skills as above.  Pt with increased SOB while don/doff B socks but insistent on use of BSC.  Pt with increased fear of sit>stand t/f; attempted Chani Stedy Stander with pt but pt requesting RW instead.   Pt c/o dizziness with sit>stand BSC>bed but refused to sit on BSC and ambulated min/mod A with RW to seated EOB.  BP taken 125/74 with no concerns but /22 SOB, OT placed O2 nasal cannula with 2L O2 (per pt report she wears this at night and as needed).  Pt with decreased WOB after O2 cannula placed  Pt repositioned in bed and nsg notified and in room for meds.   Education:    Assessment:     Sheryl Martines is a 63 y.o. female with a medical diagnosis of Appendicitis with peritonitis.  She presents with deconditioning and decreased endurance.  Performance deficits affecting function are weakness, impaired self care skills, impaired balance, decreased safety awareness, impaired skin, pain, decreased upper extremity function, impaired functional mobilty, impaired endurance, gait instability, decreased lower extremity function.      Rehab Prognosis:  Fair; patient would benefit from acute skilled OT services to address these deficits and reach maximum level of function.       Plan:     Patient to be seen 5 x/week to address the above listed problems via self-care/home management, therapeutic activities, therapeutic exercises  · Plan of Care Expires: 09/27/18  · Plan of Care Reviewed with: patient    This Plan of care has been discussed with the patient who was involved in its development and understands and is in agreement with the identified goals and treatment plan    GOALS:   Multidisciplinary Problems     Occupational Therapy Goals        Problem: Occupational Therapy Goal    Goal Priority Disciplines Outcome Interventions   Occupational Therapy Goal      OT, PT/OT Ongoing (interventions implemented as appropriate)    Description:  Goals to be met by: 8/31/2018    Patient will increase functional independence with ADLs by performing:    Feeding with Modified [place].  UE Dressing with Set-up Assistance.  LE Dressing with Minimal Assistance.  Grooming while seated with Set-up Assistance. --MET 8/31  Toileting from bedside commode with Minimal Assistance for hygiene and clothing management.   Sitting at edge of bed x20 minutes with Supervision. --MET 8/31  Rolling to Bilateral with Modified [place].   Supine to sit with Modified [place].  Stand pivot transfers with Stand-by Assistance.  Step transfer with Supervision and Stand-by Assistance  Toilet transfer to bedside commode with Stand-by Assistance.  Increased functional strength to WFL for BUE.  Upper extremity exercise program 10 reps per handout, with supervision.                       Time Tracking:     OT Date of Treatment: 09/04/18  OT Start Time: 1152  OT Stop Time: 1227  OT Total Time (min): 35 min    Billable Minutes:Self Care/Home Management 35    Tamar Rodgers OT  9/4/2018

## 2018-09-04 NOTE — PROGRESS NOTES
met with patient at bedside regarding discharge plans.  Patient reported the plan is for go to discharge with home health.  She will discharge to her daughter's home, [place]. Patient reported she already has oxygen and use it as needed.       attempted to make phone contact with [facility] [phone] to discuss discharge plans.

## 2018-09-04 NOTE — PROGRESS NOTES
"Surgery follow up  BP (!) 143/63 (Patient Position: Lying)   Pulse 84   Temp 96.6 °F (35.9 °C) (Oral)   Resp 14   Ht 5' 1" (1.549 m)   Wt 88 kg (194 lb 0.1 oz)   SpO2 95%   Breastfeeding? No   BMI 36.66 kg/m²   I/O last 3 completed shifts:  In: 320 [P.O.:120; IV Piggyback:200]  Out: 550 [Urine:450; Emesis/NG output:100]  No intake/output data recorded.  Recent Results (from the past 336 hour(s))   CBC auto differential    Collection Time: 09/04/18  5:35 AM   Result Value Ref Range    WBC 10.78 3.90 - 12.70 K/uL    Hemoglobin 8.7 (L) 12.0 - 16.0 g/dL    Hematocrit 29.7 (L) 37.0 - 48.5 %    Platelets 516 (H) 150 - 350 K/uL   CBC auto differential    Collection Time: 09/03/18  4:30 AM   Result Value Ref Range    WBC 10.73 3.90 - 12.70 K/uL    Hemoglobin 8.6 (L) 12.0 - 16.0 g/dL    Hematocrit 28.5 (L) 37.0 - 48.5 %    Platelets 574 (H) 150 - 350 K/uL   CBC auto differential    Collection Time: 09/02/18  4:30 AM   Result Value Ref Range    WBC 11.44 3.90 - 12.70 K/uL    Hemoglobin 8.5 (L) 12.0 - 16.0 g/dL    Hematocrit 28.5 (L) 37.0 - 48.5 %    Platelets 488 (H) 150 - 350 K/uL     /  Recent Results (from the past 336 hour(s))   Basic metabolic panel    Collection Time: 08/24/18 12:06 PM   Result Value Ref Range    Sodium 135 (L) 136 - 145 mmol/L    Potassium 3.5 3.5 - 5.1 mmol/L    Chloride 91 (L) 95 - 110 mmol/L    CO2 32 (H) 23 - 29 mmol/L    BUN, Bld 20 8 - 23 mg/dL    Creatinine 1.7 (H) 0.5 - 1.4 mg/dL    Calcium 9.6 8.7 - 10.5 mg/dL    Anion Gap 12 8 - 16 mmol/L   feeling better , abdomen soft eating better , advance diet  "

## 2018-09-04 NOTE — PROGRESS NOTES
Pt with HAPI BPA fired, no skin breakdown or issues at this time. Pt educated on importance of weight shifting every 15 min and repositioning every 1-2 hours. Pt is able to turn self. Demonstrated understanding verbally. Nurse aware to document on skin interventions on Work List.

## 2018-09-04 NOTE — PLAN OF CARE
Problem: Physical Therapy Goal  Goal: Physical Therapy Goal  Goals to be met by: 9/27/2018     Patient will increase functional independence with mobility by performing:    Updated 8/27/2018: David completed this date  1. Supine to sit with Modified [place]  2. Sit to supine with Modified [place]  3. Rolling to Left and Right with Modified [place].  4. Sit to stand transfer with Stand-by Assistance with AD  5. Bed to chair transfer with Stand-by Assistance with AD  6. Gait  x  feet with Stand-by Assistance using AD.   7. Ascend/Descend 8 inch curb step with Contact Guard Assistance and Minimal Assistance using AD  8. Lower extremity exercise program x10 reps with supervision      Outcome: Ongoing (interventions implemented as appropriate)  Goals ongoing

## 2018-09-04 NOTE — PHYSICIAN QUERY
PT Name: Sheryl Martines  MR #: [id number]     Physician Query Form - Documentation Clarification      CDS: Jen Taylor RN  Contact information: [email or web address]    This form is a permanent document in the medical record.     Query Date: September 4, 2018    By submitting this query, we are merely seeking further clarification of documentation. Please utilize your independent clinical judgment when addressing the question(s) below.    The Medical record reflects the following:    Supporting Clinical Findings Location in Medical Record   POCT Glucose  Date Value    09/02/2018 225 (H)    09/02/2018 360 (H)    09/01/2018 348 (H)    09/01/2018 309 (H)    09/01/2018 179 (H)    09/01/2018 282 (H)    08/31/2018 319 (H)      DM2  -Patient on moderate dose SSI   HM PN 9/3   insulin aspart U-100 pen 1-10 Units per sliding scale PRN hyperglycemia  2-8 units given multiple times   MAR 8/31-9/4                                                                        Doctor, please further specify the DM2 diagnosis associated with above clinical findings.    Provider Use Only      [ x  ] Type 2 diabetes mellitus with hyperglycemia    [ x  ] Other: has past history of DM type 2, and is on prednisone, and recently had perforated appendix with sepsis__________________________                                                                                                             [  ] Clinically undetermined

## 2018-09-04 NOTE — PROGRESS NOTES
Pharmacist Renal Dose Adjustment Note    Sheryl Martines is a 63 y.o. female being treated with the medication Lovenox Patient Data:    Vital Signs (Most Recent):  Temp: 97.8 °F (36.6 °C) (09/04/18 1138)  Pulse: (!) 124 (09/04/18 1200)  Resp: 18 (09/04/18 1138)  BP: (!) 149/69 (09/04/18 1138)  SpO2: 100 % (09/04/18 1128)   Vital Signs (72h Range):  Temp:  [96.2 °F (35.7 °C)-98.5 °F (36.9 °C)]   Pulse:  []   Resp:  [2-20]   BP: (136-157)/(63-90)   SpO2:  [95 %-100 %]      Recent Labs   Lab  09/02/18   0430  09/03/18   0430  09/04/18   0535   CREATININE  1.2  1.2  1.0     Serum creatinine: 1 mg/dL 09/04/18 0535  Estimated creatinine clearance: 58.1 mL/min    Medication:Lovenox dose: 30 mg frequency QD will be changed to medication:Lovenox dose:40 mg frequency:QD    Pharmacist's Name: Suzanne Martino  Pharmacist's Extension: [phone]

## 2018-09-05 VITALS
HEART RATE: 87 BPM | TEMPERATURE: 96 F | RESPIRATION RATE: 18 BRPM | DIASTOLIC BLOOD PRESSURE: 81 MMHG | SYSTOLIC BLOOD PRESSURE: 152 MMHG | OXYGEN SATURATION: 97 % | BODY MASS INDEX: 37 KG/M2 | HEIGHT: 61 IN | WEIGHT: 196 LBS

## 2018-09-05 LAB
ALBUMIN SERPL BCP-MCNC: 2.4 G/DL
ALP SERPL-CCNC: 92 U/L
ALT SERPL W/O P-5'-P-CCNC: 12 U/L
ANION GAP SERPL CALC-SCNC: 9 MMOL/L
ANISOCYTOSIS BLD QL SMEAR: SLIGHT
AST SERPL-CCNC: 12 U/L
BASOPHILS # BLD AUTO: 0.01 K/UL
BASOPHILS NFR BLD: 0.1 %
BILIRUB SERPL-MCNC: 0.4 MG/DL
BILIRUB UR QL STRIP: NEGATIVE
BUN SERPL-MCNC: 17 MG/DL
CALCIUM SERPL-MCNC: 8.5 MG/DL
CHLORIDE SERPL-SCNC: 93 MMOL/L
CLARITY UR: CLEAR
CO2 SERPL-SCNC: 32 MMOL/L
COLOR UR: YELLOW
CREAT SERPL-MCNC: 1 MG/DL
DIFFERENTIAL METHOD: ABNORMAL
EOSINOPHIL # BLD AUTO: 0 K/UL
EOSINOPHIL NFR BLD: 0 %
ERYTHROCYTE [DISTWIDTH] IN BLOOD BY AUTOMATED COUNT: 17.9 %
EST. GFR  (AFRICAN AMERICAN): >60 ML/MIN/1.73 M^2
EST. GFR  (NON AFRICAN AMERICAN): >60 ML/MIN/1.73 M^2
GLUCOSE SERPL-MCNC: 320 MG/DL
GLUCOSE UR QL STRIP: ABNORMAL
HCT VFR BLD AUTO: 30.5 %
HGB BLD-MCNC: 8.7 G/DL
HGB UR QL STRIP: NEGATIVE
HYPOCHROMIA BLD QL SMEAR: ABNORMAL
KETONES UR QL STRIP: NEGATIVE
LEUKOCYTE ESTERASE UR QL STRIP: NEGATIVE
LYMPHOCYTES # BLD AUTO: 1 K/UL
LYMPHOCYTES NFR BLD: 6.6 %
MAGNESIUM SERPL-MCNC: 1.9 MG/DL
MCH RBC QN AUTO: 23.6 PG
MCHC RBC AUTO-ENTMCNC: 28.5 G/DL
MCV RBC AUTO: 83 FL
MONOCYTES # BLD AUTO: 0.9 K/UL
MONOCYTES NFR BLD: 5.8 %
NEUTROPHILS # BLD AUTO: 13.4 K/UL
NEUTROPHILS NFR BLD: 87.5 %
NITRITE UR QL STRIP: NEGATIVE
OVALOCYTES BLD QL SMEAR: ABNORMAL
PH UR STRIP: 7 [PH] (ref 5–8)
PHOSPHATE SERPL-MCNC: 3.5 MG/DL
PLATELET # BLD AUTO: 596 K/UL
PLATELET BLD QL SMEAR: ABNORMAL
PMV BLD AUTO: 9.5 FL
POCT GLUCOSE: 288 MG/DL (ref 70–110)
POCT GLUCOSE: 321 MG/DL (ref 70–110)
POIKILOCYTOSIS BLD QL SMEAR: SLIGHT
POLYCHROMASIA BLD QL SMEAR: ABNORMAL
POTASSIUM SERPL-SCNC: 3.6 MMOL/L
PROT SERPL-MCNC: 5.6 G/DL
PROT UR QL STRIP: ABNORMAL
RBC # BLD AUTO: 3.69 M/UL
SODIUM SERPL-SCNC: 134 MMOL/L
SP GR UR STRIP: <=1.005 (ref 1–1.03)
TARGETS BLD QL SMEAR: ABNORMAL
URN SPEC COLLECT METH UR: ABNORMAL
UROBILINOGEN UR STRIP-ACNC: NEGATIVE EU/DL
WBC # BLD AUTO: 15.25 K/UL

## 2018-09-05 PROCEDURE — 25000003 PHARM REV CODE 250: Performed by: STUDENT IN AN ORGANIZED HEALTH CARE EDUCATION/TRAINING PROGRAM

## 2018-09-05 PROCEDURE — 63600175 PHARM REV CODE 636 W HCPCS: Performed by: FAMILY MEDICINE

## 2018-09-05 PROCEDURE — 99900035 HC TECH TIME PER 15 MIN (STAT)

## 2018-09-05 PROCEDURE — 25000003 PHARM REV CODE 250: Performed by: INTERNAL MEDICINE

## 2018-09-05 PROCEDURE — 87186 SC STD MICRODIL/AGAR DIL: CPT | Mod: 59

## 2018-09-05 PROCEDURE — 83735 ASSAY OF MAGNESIUM: CPT

## 2018-09-05 PROCEDURE — 63600175 PHARM REV CODE 636 W HCPCS: Performed by: STUDENT IN AN ORGANIZED HEALTH CARE EDUCATION/TRAINING PROGRAM

## 2018-09-05 PROCEDURE — 94640 AIRWAY INHALATION TREATMENT: CPT

## 2018-09-05 PROCEDURE — 87070 CULTURE OTHR SPECIMN AEROBIC: CPT | Mod: 59

## 2018-09-05 PROCEDURE — 97110 THERAPEUTIC EXERCISES: CPT

## 2018-09-05 PROCEDURE — 81003 URINALYSIS AUTO W/O SCOPE: CPT

## 2018-09-05 PROCEDURE — 87077 CULTURE AEROBIC IDENTIFY: CPT

## 2018-09-05 PROCEDURE — 25000242 PHARM REV CODE 250 ALT 637 W/ HCPCS: Performed by: STUDENT IN AN ORGANIZED HEALTH CARE EDUCATION/TRAINING PROGRAM

## 2018-09-05 PROCEDURE — 94761 N-INVAS EAR/PLS OXIMETRY MLT: CPT

## 2018-09-05 PROCEDURE — A4216 STERILE WATER/SALINE, 10 ML: HCPCS | Performed by: STUDENT IN AN ORGANIZED HEALTH CARE EDUCATION/TRAINING PROGRAM

## 2018-09-05 PROCEDURE — 94667 MNPJ CHEST WALL 1ST: CPT

## 2018-09-05 PROCEDURE — 97803 MED NUTRITION INDIV SUBSEQ: CPT

## 2018-09-05 PROCEDURE — 94664 DEMO&/EVAL PT USE INHALER: CPT

## 2018-09-05 PROCEDURE — 80053 COMPREHEN METABOLIC PANEL: CPT

## 2018-09-05 PROCEDURE — 25000003 PHARM REV CODE 250: Performed by: HOSPITALIST

## 2018-09-05 PROCEDURE — 84100 ASSAY OF PHOSPHORUS: CPT

## 2018-09-05 PROCEDURE — 97530 THERAPEUTIC ACTIVITIES: CPT

## 2018-09-05 PROCEDURE — 87075 CULTR BACTERIA EXCEPT BLOOD: CPT

## 2018-09-05 PROCEDURE — 25000003 PHARM REV CODE 250: Performed by: OPHTHALMOLOGY

## 2018-09-05 PROCEDURE — 85025 COMPLETE CBC W/AUTO DIFF WBC: CPT

## 2018-09-05 RX ORDER — CLINDAMYCIN HYDROCHLORIDE 300 MG/1
300 CAPSULE ORAL 2 TIMES DAILY
Qty: 14 CAPSULE | Refills: 0 | Status: SHIPPED | OUTPATIENT
Start: 2018-09-05 | End: 2018-09-12

## 2018-09-05 RX ORDER — HEPARIN 100 UNIT/ML
5 SYRINGE INTRAVENOUS ONCE
Status: COMPLETED | OUTPATIENT
Start: 2018-09-05 | End: 2018-09-05

## 2018-09-05 RX ORDER — TOBRAMYCIN AND DEXAMETHASONE 3; 1 MG/ML; MG/ML
1 SUSPENSION/ DROPS OPHTHALMIC
Qty: 5 ML | Refills: 0 | Status: SHIPPED | OUTPATIENT
Start: 2018-09-05 | End: 2018-09-05 | Stop reason: HOSPADM

## 2018-09-05 RX ORDER — DICLOFENAC SODIUM 10 MG/G
GEL TOPICAL DAILY
Qty: 100 G | Refills: 2 | Status: ON HOLD | OUTPATIENT
Start: 2018-09-05 | End: 2018-09-23

## 2018-09-05 RX ADMIN — THEOPHYLLINE ANHYDROUS 200 MG: 100 CAPSULE, EXTENDED RELEASE ORAL at 09:09

## 2018-09-05 RX ADMIN — IPRATROPIUM BROMIDE AND ALBUTEROL SULFATE 3 ML: .5; 3 SOLUTION RESPIRATORY (INHALATION) at 07:09

## 2018-09-05 RX ADMIN — METOCLOPRAMIDE 10 MG: 5 INJECTION, SOLUTION INTRAMUSCULAR; INTRAVENOUS at 05:09

## 2018-09-05 RX ADMIN — ATORVASTATIN CALCIUM 40 MG: 40 TABLET, FILM COATED ORAL at 09:09

## 2018-09-05 RX ADMIN — PRAZOSIN HYDROCHLORIDE 5 MG: 1 CAPSULE ORAL at 09:09

## 2018-09-05 RX ADMIN — INSULIN ASPART 8 UNITS: 100 INJECTION, SOLUTION INTRAVENOUS; SUBCUTANEOUS at 01:09

## 2018-09-05 RX ADMIN — INSULIN ASPART 8 UNITS: 100 INJECTION, SOLUTION INTRAVENOUS; SUBCUTANEOUS at 06:09

## 2018-09-05 RX ADMIN — TOBRAMYCIN AND DEXAMETHASONE 1 DROP: 3; 1 SUSPENSION/ DROPS OPHTHALMIC at 09:09

## 2018-09-05 RX ADMIN — IPRATROPIUM BROMIDE AND ALBUTEROL SULFATE 3 ML: .5; 3 SOLUTION RESPIRATORY (INHALATION) at 01:09

## 2018-09-05 RX ADMIN — Medication 500 MG: at 12:09

## 2018-09-05 RX ADMIN — PREDNISONE 20 MG: 20 TABLET ORAL at 09:09

## 2018-09-05 RX ADMIN — TOBRAMYCIN AND DEXAMETHASONE 1 DROP: 3; 1 SUSPENSION/ DROPS OPHTHALMIC at 05:09

## 2018-09-05 RX ADMIN — LOSARTAN POTASSIUM 100 MG: 50 TABLET, FILM COATED ORAL at 09:09

## 2018-09-05 RX ADMIN — FUROSEMIDE 40 MG: 40 TABLET ORAL at 09:09

## 2018-09-05 RX ADMIN — HEPARIN SODIUM (PORCINE) LOCK FLUSH IV SOLN 100 UNIT/ML 500 UNITS: 100 SOLUTION at 01:09

## 2018-09-05 RX ADMIN — TRAMADOL HYDROCHLORIDE 50 MG: 50 TABLET, COATED ORAL at 05:09

## 2018-09-05 RX ADMIN — CLOPIDOGREL BISULFATE 75 MG: 75 TABLET ORAL at 09:09

## 2018-09-05 RX ADMIN — DILTIAZEM HYDROCHLORIDE 180 MG: 180 CAPSULE, EXTENDED RELEASE ORAL at 09:09

## 2018-09-05 RX ADMIN — Medication 500 MG: at 01:09

## 2018-09-05 RX ADMIN — METRONIDAZOLE 500 MG: 500 TABLET ORAL at 12:09

## 2018-09-05 RX ADMIN — Medication 3 ML: at 10:09

## 2018-09-05 RX ADMIN — Medication 500 MG: at 05:09

## 2018-09-05 RX ADMIN — METOCLOPRAMIDE 10 MG: 5 INJECTION, SOLUTION INTRAMUSCULAR; INTRAVENOUS at 12:09

## 2018-09-05 RX ADMIN — SENNOSIDES AND DOCUSATE SODIUM 1 TABLET: 8.6; 5 TABLET ORAL at 09:09

## 2018-09-05 RX ADMIN — FAMOTIDINE 20 MG: 20 TABLET, FILM COATED ORAL at 09:09

## 2018-09-05 RX ADMIN — METRONIDAZOLE 500 MG: 500 TABLET ORAL at 05:09

## 2018-09-05 RX ADMIN — ASPIRIN 81 MG: 81 TABLET, COATED ORAL at 09:09

## 2018-09-05 NOTE — PROGRESS NOTES
[facility] Hospital Medicine  Progress Note    Patient Name: Sheryl Martines  MRN: [id number]  Patient Class: IP- Inpatient   Admission Date: 8/22/2018  Length of Stay: 12 days  Attending Physician: Cassy Wick MD  Primary Care Provider: Primary Doctor No        Subjective:     Principal Problem:Appendicitis with peritonitis    HPI:  63 y.o. 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 Most of the information was provided by the daughter who lives with the patient.     At around 3 pm the day PTA the daughter left and when she returned around 8 pm she noticed that her mother was altered and had slurred speech which was new. The daughter was worried that her mother took a percocet that she was recently prescribed for her pain or a new stroke was causing her AMS and slurred speech. The patient did not notice when her slurred speech started. The daughter also noticed that she was having trouble finding words. The daughter did not believe she fell. The patient was recently admitted on 8/14/18 for a fall and was diagnosed with compression fractures. She did not hit her head at that time. She went to a hospital in [place] and recently was given percocets to take at home. The patient was oriented to person, place, but not time. She had trouble speaking during questioning.     CT of the head showed presumed old lacunar infarcts with no acute lesions. The daughter reports she can not get an MRI due to having a back implant. The daughter notes that the patient did not complain of new focal weakness, numbness, tongue biting, or urinary incontinence. While in the ED the patient started to shake with all limbs mildly but she could converse during these shaking events and answer some questions. She denied chest pain, SOB, fevers, nausea, vomiting and headache.       Interval History:     Continued nausea, tiny BM,  tolerating broth.     Objective:     Vital Signs (Most Recent):  Temp: 98.2 °F (36.8 °C) (09/02/18 1147)  Pulse: 105 (09/02/18 1200)  Resp: 20 (09/02/18 1147)  BP: (!) 151/83 (09/02/18 1147)  SpO2: 98 % (09/02/18 1253) Vital Signs (24h Range):  Temp:  [97 °F (36.1 °C)-98.2 °F (36.8 °C)] 98.2 °F (36.8 °C)  Pulse:  [] 105  Resp:  [18-20] 20  SpO2:  [97 %-100 %] 98 %  BP: (141-157)/(72-90) 151/83     Weight: 88 kg (194 lb 0.1 oz)  Body mass index is 36.66 kg/m².    Intake/Output Summary (Last 24 hours) at 9/2/2018 1414  Last data filed at 9/2/2018 0613  Gross per 24 hour   Intake 555 ml   Output 500 ml   Net 55 ml      Physical Exam   Constitutional: She is oriented to person, place, and time.   HENT:   Head: Normocephalic and atraumatic.   Mouth/Throat: No oropharyngeal exudate.   Eyes: Conjunctivae and EOM are normal. Pupils are equal, round, and reactive to light.   Neck: Normal range of motion. Neck supple. No JVD present.   Cardiovascular: Normal rate, regular rhythm and normal heart sounds.   No murmur heard.  Pulmonary/Chest: Effort normal. No respiratory distress. She has wheezes (mild). She has no rales.   Abdominal: She exhibits no mass. There is tenderness.   Firm, Hypoactive BS, incision with stables, clean and dry.  Moderate purulent drainage on bandage  Musculoskeletal: Normal range of motion. She exhibits no edema.   Neurological: She is alert and oriented to person, place, and time. No cranial nerve deficit.   Skin: Skin is warm and dry. Capillary refill takes less than 2 seconds. She is not diaphoretic.   Psychiatric: discouraged but states she has no problem sitting up in chair.  Nursing note and vitals reviewed.      Significant Labs:   CBC:   Recent Labs   Lab  09/01/18   0646  09/02/18   0430   WBC  10.65  11.44   HGB  8.2*  8.5*   HCT  27.5*  28.5*   PLT  519*  488*     CMP:   Recent Labs   Lab  09/01/18   0646  09/02/18   0430   NA  136  134*   K  3.3*  3.6   CL  87*  84*   CO2  40*  40*    GLU  263*  372*   BUN  15  20   CREATININE  0.9  1.2   CALCIUM  9.5  9.0   PROT  6.0  5.8*   ALBUMIN  2.2*  2.3*   BILITOT  0.4  0.4   ALKPHOS  110  106   AST  15  20   ALT  16  14   ANIONGAP  9  10   EGFRNONAA  >60  48*       Significant Imaging: I have reviewed all pertinent imaging results/findings within the past 24 hours.    Assessment/Plan:      63 y.o. 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  Subsequently with C Diff     S/p Appy  - Blood cultures x 2 - Negative   - POD#9 for open appendectomy   - Abx- flagyl   - Surgical site dressing changed yesterday by surgery  - Appendix culture- positive for E coli sensitive to cipro. Received 2 days of IV cipro.   - ID consult: continue PO flagyl for bacteroides infection with vanc for C diff.  Goal stop 9/13  - No abscess per IR and surgery     C. Diff Infection  - Positive c diff toxin by PCR and c diff EIA  - Per ID: continue PO vancomycin until 9/13     NIKHIL (resolved)  - BUN/Crea of 26/2.5 on admit  - BUN/Crea stable     CVA    CT head is negative for acute hemorrhagic event  Anti-platelet therapy: ASA 81/Plavix 75mg   Atorvastatin 40mg PO daily  Q4 nuero checks  PT/OT/ST  Fall precautions  Drug screen pos for opiates    EEG performed- encephalopathy - subsequently had Appy   CTA head and neck Unremarkable and without evidence for proximal significant stenosis or occlusion.     HTN  Clonidine .1mg PO BID  Lasix 40mg PO BID  Diltiazem 180mg ER PO daily      DM2  -Patient on moderate dose SSI     CAD  - Continue ASA and plavix and Atorvastatin      Elevated troponin- resolving   - 0.273-->0.267--> 0.233--> .098 --> resolved     PT/OT: ordered and following  Code: full  Diet:bariatric soft  Ppx: dvt: lovenox      Dispo: Discharge likely tomorrow     Case discussed with staff        VTE Risk Mitigation (From admission, onward)         Ordered     enoxaparin injection 40 mg  Daily      09/04/18 1400     Place sequential compression device  Until discontinued      08/23/18 0103     IP VTE HIGH RISK PATIENT  Once      08/23/18 0103              Julian Carmichael MD  Department of Hospital Medicine   [facility]

## 2018-09-05 NOTE — PLAN OF CARE
Problem: Physical Therapy Goal  Goal: Physical Therapy Goal  Goals to be met by: 9/27/2018     Patient will increase functional independence with mobility by performing:    Updated 8/27/2018: David completed this date  1. Supine to sit with Modified [place]  2. Sit to supine with Modified [place]  3. Rolling to Left and Right with Modified [place].  4. Sit to stand transfer with Stand-by Assistance with AD  5. Bed to chair transfer with Stand-by Assistance with AD  6. Gait  x  feet with Stand-by Assistance using AD.   7. Ascend/Descend 8 inch curb step with Contact Guard Assistance and Minimal Assistance using AD  8. Lower extremity exercise program x10 reps with supervision      Outcome: Ongoing (interventions implemented as appropriate)  Goals ongoing

## 2018-09-05 NOTE — PLAN OF CARE
Follow-up With  Details  Why  Contact Info   Bernadine Melendrez MD  On 9/13/2018  @2:25PM  [address]  [address]  [facility] [address]  [phone]   [facility] [address] [address]  [address]  [place] [address]  [phone]   Primary Doctor No  Schedule an appointment as soon as possible for a visit  with Medical Provider of choice

## 2018-09-05 NOTE — PT/OT/SLP DISCHARGE
Occupational Therapy Discharge Summary    Sherly Martines  MRN: [id number]   Principal Problem: Appendicitis with peritonitis      Patient Discharged from acute Occupational Therapy on 09/05/2018.  Please refer to prior OT note dated 9/4/18 for functional status.    Assessment:      Patient was discharged unexpectedly.  Information required to complete an accurate discharge summary is unknown.  Refer to therapy initial evaluation and last progress note for initial and most recent functional status and goal achievement.  Recommendations made may be found in medical record.    Objective:     GOALS:   Multidisciplinary Problems     Occupational Therapy Goals     Not on file          Multidisciplinary Problems (Resolved)        Problem: Occupational Therapy Goal    Goal Priority Disciplines Outcome Interventions   Occupational Therapy Goal   (Resolved)     OT, PT/OT Outcome(s) achieved    Description:  Goals to be met by: 8/31/2018    Patient will increase functional independence with ADLs by performing:    Feeding with Modified [place].  UE Dressing with Set-up Assistance.  LE Dressing with Minimal Assistance.  Grooming while seated with Set-up Assistance. --MET 8/31  Toileting from bedside commode with Minimal Assistance for hygiene and clothing management.   Sitting at edge of bed x20 minutes with Supervision. --MET 8/31  Rolling to Bilateral with Modified [place].   Supine to sit with Modified [place].  Stand pivot transfers with Stand-by Assistance.  Step transfer with Supervision and Stand-by Assistance  Toilet transfer to bedside commode with Stand-by Assistance.  Increased functional strength to WFL for BUE.  Upper extremity exercise program 10 reps per handout, with supervision.                       Reasons for Discontinuation of Therapy Services  Transfer to alternate level of care.      Plan:     Patient Discharged to: Home with Home Health Service    Tamar Rodgers, OT  9/5/2018

## 2018-09-05 NOTE — PROGRESS NOTES
" [facility]  Adult Nutrition  Progress Note    SUMMARY       Recommendations    Recommendation/Intervention:   1. Add 2000 ADA restrictions to diet.   2. Add Boost Glucose Control bid.   3. Consider appetite stimulant.   4. Continue to encourage good intake at meals    Goals:   Pt will consume at least 50% intake at meals  Nutrition Goal Status: progressing towards goal  Communication of RD Recs: reviewed with RN(Katherine)    Reason for Assessment  Reason for Assessment: RD follow-up  Diagnosis: (AMS)  Relevant Medical History: CAD, DM, high cholesterol, stroke, pulmonary embolus  Interdisciplinary Rounds: attended  General Information Comments: Pt on Bariatric Soft diet. pt with poor itnake at recent meals.   Nutrition Discharge Planning: pt to d/c on [place] ADA diet    Nutrition Risk Screen  Nutrition Risk Screen: no indicators present    Nutrition/Diet History  Food Preferences: unable to assess  Do you have any cultural, spiritual, Baptism conflicts, given your current situation?: (none reported)  Factors Affecting Nutritional Intake: (just extubated)    Anthropometrics  Temp: 96.3 °F (35.7 °C)  Height Method: Stated  Height: 5' 1" (154.9 cm)  Height (inches): 61 in  Weight Method: Bed Scale  Weight: 88.9 kg (195 lb 15.8 oz)  Weight (lb): 195.99 lb  Ideal Body Weight (IBW), Female: 105 lb  % Ideal Body Weight, Female (lb): 169.89 lb  BMI (Calculated): 33.8  BMI Grade: 30 - 34.9- obesity - grade I     Lab/Procedures/Meds  Pertinent Labs Reviewed: reviewed  Pertinent Labs Comments: Na 134L, Glu 320H, Ca 8.5L, Alb 2.4L  Pertinent Medications Reviewed: reviewed  Pertinent Medications Comments: aspirin, Reglan, insulin, Lasix, prednisone    Physical Findings/Assessment  Overall Physical Appearance: overweight  Tubes: (none)  Oral/Mouth Cavity: (unable to assess)  Skin: (Finn 17-abd incision)    Estimated/Assessed Needs  Weight Used For Calorie Calculations: 88 kg (194 lb 0.1 oz)  Energy Calorie " Requirements (kcal): 1784  Energy Need Method: [place]-St Jeor(x1.3)  Protein Requirements: 70g (0.8g/kg)  Weight Used For Protein Calculations: 88 kg (194 lb 0.1 oz)  Fluid Need Method: RDA Method  RDA Method (mL): 1784  CHO Requirement: 175g    Nutrition Prescription Ordered  Current Diet Order: Bariatric Soft    Evaluation of Received Nutrient/Fluid Intake  Energy Calories Required: not meeting needs  Protein Required: not meeting needs  Fluid Required: not meeting needs  Comments: LBM 9/4  % Intake of Estimated Energy Needs: 0 - 25 %  % Meal Intake: 0 - 25 %    Nutrition Risk  Level of Risk/Frequency of Follow-up: (2xweekly)     Assessment and Plan    Altered mental status-resolved as of 9/4/2018    Contributing Nutrition Diagnosis  Inadequate energy intake    Related to (etiology):   dx    Signs and Symptoms (as evidenced by):   NPO     Interventions/Recommendations (treatment strategy):  See recs    Nutrition Diagnosis Status:   Improving             Monitor and Evaluation  Food and Nutrient Intake: food and beverage intake  Food and Nutrient Adminstration: diet order  Physical Activity and Function: nutrition-related ADLs and IADLs  Anthropometric Measurements: weight  Biochemical Data, Medical Tests and Procedures: electrolyte and renal panel  Nutrition-Focused Physical Findings: overall appearance     Nutrition Follow-Up  RD Follow-up?: Yes

## 2018-09-05 NOTE — PLAN OF CARE
)900am- Patient to d/c today with family.  referral faxed to Lm  per patient's daughter'sKandice's request.  Ms Kandice states patient has DME including walker in place.  Daughter would like to schedule mother's own follow up appointment outside of [facility] system, [place] offered to make appointment with any provider. Daughter declined at this time.   TN noted daughter has concerns regarding regarding care. [place] Charge nurse informed and will follow up.     Frances with outpatient pharmacy working on discharge medications and will update daughter with cost.     Update: 2:30pm   Discharge rounds on patient. Discussed followup appointments, blue discharge folder. Patient has been accepted by Lm   and will be seen tomorrow. Patient states she has received discharging medications.  and reasons for medications and final discharge instructions. All patient/caregiver questions answered. Patient verbalized understanding. Patient awaiting WC to be transported to [facility].     Follow-up With   Details   Why   Contact Info   Bernadine Melendrez MD   On 9/13/2018   @2:25PM   [address]  [address]  [facility] [address]  [phone]   [facility] [place] [id number] [facility] [address]  [facility] [address]  [phone]   Primary Doctor No   Schedule an appointment as soon as possible for a visit   with Medical Provider of choice                     Future Appointments   Date Time Provider Department Center   9/13/2018  2:20 PM Bernadine Melendrez MD [facility]           09/05/18 1430   Final Note   Assessment Type Final Discharge Note   Discharge Disposition Home-Health   What phone number can be called within the next 1-3 days to see how you are doing after discharge? [phone]   Hospital Follow Up  Appt(s) scheduled? Yes   Discharge plans and expectations educations in teach back method with documentation complete? Yes   Right Care Referral Info   Post Acute Recommendation Home-care   Referral  Type [facility]

## 2018-09-05 NOTE — PT/OT/SLP PROGRESS
Physical Therapy Treatment    Patient Name:  Sheryl Martines   MRN:  [id number]    Recommendations:     Discharge Recommendations:  nursing facility, skilled   Discharge Equipment Recommendations: shower chair, walker, rolling   Barriers to discharge: decreased mobility,strength and endurance    Assessment:     Sheryl Martines is a 63 y.o. female admitted with a medical diagnosis of Appendicitis with peritonitis.  She presents with the following impairments/functional limitations:  weakness, impaired endurance, impaired functional mobilty, gait instability, impaired balance, decreased lower extremity function, decreased safety awareness, impaired coordination, impaired skin pt was up in b/s chair this AM and requires Mod A with chair-bed t/f and remains anxious and fearful,pt will benefit from SNF upon discharge to progress functional independence.    Rehab Prognosis:  Good; patient would benefit from acute skilled PT services to address these deficits and reach maximum level of function.      Recent Surgery: Procedure(s) (LRB):  APPENDECTOMY, LAPAROSCOPIC---CONVERTED TO OPEN APPENDECTOMY @0950 (N/A)  APPENDECTOMY (N/A) 10 Days Post-Op    Plan:     During this hospitalization, patient to be seen 6 x/week to address the above listed problems via gait training, therapeutic activities, therapeutic exercises  · Plan of Care Expires:  10/03/18   Plan of Care Reviewed with: patient    Subjective     Communicated with nsg prior to session.  Patient found up in chair upon PT entry to room, agreeable to treatment.      Chief Complaint: n/a  Patient comments/goals: pt requested back to bed  Pain/Comfort:  · Pain Rating 1: (no c/o's)    Patients cultural, spiritual, Gnosticism conflicts given the current situation: none reported    Objective:     Patient found with: bed alarm, telemetry     General Precautions: Standard, fall   Orthopedic Precautions:N/A   Braces: N/A     Functional Mobility:  · Bed Mobility:     · Sit to  Supine: minimum assistance and moderate assistance  · Transfers:     · Sit to Stand:  moderate assistance with rolling walker  · Bed to Chair: moderate assistance with  rolling walker  using  Step Transfer  · Gait: 2-3 turning steps to bed with RW and Mod A  · Balance: requires RW for standing balance      AM-PAC 6 CLICK MOBILITY  Turning over in bed (including adjusting bedclothes, sheets and blankets)?: 3  Sitting down on and standing up from a chair with arms (e.g., wheelchair, bedside commode, etc.): 2  Moving from lying on back to sitting on the side of the bed?: 3  Moving to and from a bed to a chair (including a wheelchair)?: 2  Need to walk in hospital room?: 1  Climbing 3-5 steps with a railing?: 1  Basic Mobility Total Score: 12       Therapeutic Activities and Exercises: le supine ex's X 10-12 reps inc: ap,qs,hs,abd/add slr       Patient left supine with all lines intact, call button in reach, bed alarm on and nsg notified..    GOALS: see general POC  Multidisciplinary Problems     Physical Therapy Goals        Problem: Physical Therapy Goal    Goal Priority Disciplines Outcome Goal Variances Interventions   Physical Therapy Goal     PT, PT/OT Ongoing (interventions implemented as appropriate)     Description:  Goals to be met by: 9/27/2018     Patient will increase functional independence with mobility by performing:    Updated 8/27/2018: David completed this date  1. Supine to sit with Modified [place]  2. Sit to supine with Modified [place]  3. Rolling to Left and Right with Modified [place].  4. Sit to stand transfer with Stand-by Assistance with AD  5. Bed to chair transfer with Stand-by Assistance with AD  6. Gait  x  feet with Stand-by Assistance using AD.   7. Ascend/Descend 8 inch curb step with Contact Guard Assistance and Minimal Assistance using AD  8. Lower extremity exercise program x10 reps with supervision              Problem: Physical Therapy Goal    Goal Priority  Disciplines Outcome Goal Variances Interventions   Physical Therapy Goal     PT, PT/OT             Problem: Physical Therapy Goal    Goal Priority Disciplines Outcome Goal Variances Interventions   Physical Therapy Goal     PT, PT/OT                      Time Tracking:     PT Received On: 09/05/18  PT Start Time: 0758     PT Stop Time: 0821  PT Total Time (min): 23 min     Billable Minutes: Therapeutic Activity 13 and Therapeutic Exercise 10    Treatment Type: Treatment  PT/PTA: PTA     PTA Visit Number: 2     Paul Sung, VASHTI  09/05/2018

## 2018-09-05 NOTE — PLAN OF CARE
Problem: Patient Care Overview  Goal: Plan of Care Review  Outcome: Ongoing (interventions implemented as appropriate)  Pt in NAD. V/s stable. AAOx4. R chest wall port o cath accessed saline locked dressing CDI. Incision to abd edges approximated. Pt vomiting small amounts and burping. Scheduled medications provided. Continuous cardiac monitoring NSR. Bowel care provided with enema pt did not tolerate well. Blood glucose monitoring continued. Encouraged to call for assistance verbalized understanding. Safety maintained bed in low locked position, Sr up X2, and bed alarm on, call bell in reach. Will continue to monitor.

## 2018-09-05 NOTE — ASSESSMENT & PLAN NOTE
Her test results, diarrhoea and leucocytosis (may be multifactorial) are concerning for cdiff infection. Would treat as non severe cdiff, first episode     Recs  - po vancomycin for 14 days from 8/30  - continue po flagyl for 14 days from 8/30

## 2018-09-05 NOTE — SUBJECTIVE & OBJECTIVE
Interval History:     No nausea, no vomiting, some flatus, some drainage from wound    Review of Systems   Gastrointestinal: Positive for abdominal distention, abdominal pain and constipation.   All other systems reviewed and are negative.    Objective:     Vital Signs (Most Recent):  Temp: 97.8 °F (36.6 °C) (09/04/18 1600)  Pulse: 110 (09/04/18 1600)  Resp: 18 (09/04/18 1600)  BP: (!) 147/97 (09/04/18 1600)  SpO2: 100 % (09/04/18 1128) Vital Signs (24h Range):  Temp:  [96.6 °F (35.9 °C)-98.5 °F (36.9 °C)] 97.8 °F (36.6 °C)  Pulse:  [] 110  Resp:  [14-20] 18  SpO2:  [95 %-100 %] 100 %  BP: (137-149)/(63-97) 147/97     Weight: 88 kg (194 lb 0.1 oz)  Body mass index is 36.66 kg/m².    Estimated Creatinine Clearance: 58.1 mL/min (based on SCr of 1 mg/dL).    Physical Exam   Constitutional: She is oriented to person, place, and time. No distress.   Eyes: No scleral icterus.   Neck: No JVD present.   Cardiovascular: Normal rate, regular rhythm and normal heart sounds.   Pulmonary/Chest: She has no wheezes. She has no rales.   Abdominal: Soft. Bowel sounds are normal. There is tenderness.   Musculoskeletal: She exhibits no edema.   Neurological: She is alert and oriented to person, place, and time.   Skin: Skin is warm and dry. No rash noted. She is not diaphoretic.       Significant Labs:   BMP:   Recent Labs   Lab  09/04/18   0535   GLU  343*   NA  134*   K  3.9   CL  92*   CO2  33*   BUN  19   CREATININE  1.0   CALCIUM  8.5*   MG  1.9     CBC:   Recent Labs   Lab  09/03/18 0430 09/04/18   0535   WBC  10.73  10.78   HGB  8.6*  8.7*   HCT  28.5*  29.7*   PLT  574*  516*     CMP:   Recent Labs   Lab  09/03/18 0430 09/04/18   0535   NA  135*  134*   K  3.9  3.9   CL  88*  92*   CO2  40*  33*   GLU  309*  343*   BUN  20  19   CREATININE  1.2  1.0   CALCIUM  8.7  8.5*   PROT  5.7*  5.7*   ALBUMIN  2.3*  2.3*   BILITOT  0.5  0.4   ALKPHOS  100  95   AST  13  13   ALT  12  12   ANIONGAP  7*  9   EGFRNONAA  48*  >60        Significant Imaging: I have reviewed all pertinent imaging results/findings within the past 24 hours.

## 2018-09-05 NOTE — PLAN OF CARE
Problem: Patient Care Overview  Goal: Plan of Care Review  Outcome: Ongoing (interventions implemented as appropriate)  The proper method of use, as well as anticipated side effects, of this aerosol treatment are discussed and demonstrated to the patient. Pt on RA with documented sats. CPT complete tolerated well with AMY. Will continue to monitor.

## 2018-09-05 NOTE — NURSING
Pt and daughter given dischrge instructions and avs packet. Pt informed of new medication with name , dose, frequency and side effects. Pt and daughter educated on last time medication was given and next time the medication id due with the next time due notied of discharge papers. Pt informed that [facility] was set up per . Pt informed of signs and symptomes of infections and when to notify MD. Pt and daughter verbalized understanding with no further questions at this time.

## 2018-09-05 NOTE — PT/OT/SLP PROGRESS
"Occupational Therapy      Patient Name:  Sheryl Martines   MRN:  [id number]    10:12 AM: Patient not seen today secondary to nsg working with pt at bedside. Will follow-up as able.  10:50 AM: Pt not seen 2/2 refusal. Pt with visitor holding child in room without Special Contact precautions gown. Visitor reminded that the medical team is asking all visitors to don gown for Special Precautions. Visitor very rude this AM and refusing stating "You're the only thing annoying me. Condescending b**&*"    Tamar Rodgers OT  9/5/2018  "

## 2018-09-05 NOTE — PROGRESS NOTES
[facility] Hospital Medicine  Progress Note    Patient Name: Sheryl Martines  MRN: [id number]  Patient Class: IP- Inpatient   Admission Date: 8/22/2018  Length of Stay: 13 days  Attending Physician: Cassy Wick MD  Primary Care Provider: Primary Doctor No        Subjective:     Principal Problem:Appendicitis with peritonitis    HPI:  63 y.o. 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 Most of the information was provided by the daughter who lives with the patient.     At around 3 pm the day PTA the daughter left and when she returned around 8 pm she noticed that her mother was altered and had slurred speech which was new. The daughter was worried that her mother took a percocet that she was recently prescribed for her pain or a new stroke was causing her AMS and slurred speech. The patient did not notice when her slurred speech started. The daughter also noticed that she was having trouble finding words. The daughter did not believe she fell. The patient was recently admitted on 8/14/18 for a fall and was diagnosed with compression fractures. She did not hit her head at that time. She went to a hospital in [place] and recently was given percocets to take at home. The patient was oriented to person, place, but not time. She had trouble speaking during questioning.     CT of the head showed presumed old lacunar infarcts with no acute lesions. The daughter reports she can not get an MRI due to having a back implant. The daughter notes that the patient did not complain of new focal weakness, numbness, tongue biting, or urinary incontinence. While in the ED the patient started to shake with all limbs mildly but she could converse during these shaking events and answer some questions. She denied chest pain, SOB, fevers, nausea, vomiting and headache.       Interval History:     Nausea, somewhat better, BM  with enema yesterday.  Some relief in bowels.   New bowel sounds.  Still not moving much.  PT/OT still recommending SNF.    Objective:     Vital Signs (Most Recent):  Temp: 98.2 °F (36.8 °C) (09/02/18 1147)  Pulse: 105 (09/02/18 1200)  Resp: 20 (09/02/18 1147)  BP: (!) 151/83 (09/02/18 1147)  SpO2: 98 % (09/02/18 1253) Vital Signs (24h Range):  Temp:  [97 °F (36.1 °C)-98.2 °F (36.8 °C)] 98.2 °F (36.8 °C)  Pulse:  [] 105  Resp:  [18-20] 20  SpO2:  [97 %-100 %] 98 %  BP: (141-157)/(72-90) 151/83     Weight: 88 kg (194 lb 0.1 oz)  Body mass index is 36.66 kg/m².    Intake/Output Summary (Last 24 hours) at 9/2/2018 1414  Last data filed at 9/2/2018 0613  Gross per 24 hour   Intake 555 ml   Output 500 ml   Net 55 ml      Physical Exam   Constitutional: She is oriented to person, place, and time.   HENT:   Head: Normocephalic and atraumatic.   Mouth/Throat: No oropharyngeal exudate.   Eyes: Conjunctivae and EOM are normal. Pupils are equal, round, and reactive to light.   Neck: Normal range of motion. Neck supple. No JVD present.   Cardiovascular: Normal rate, regular rhythm and normal heart sounds.   No murmur heard.  Pulmonary/Chest: Effort normal. No respiratory distress. She has wheezes (mild). She has no rales.   Abdominal: She exhibits no mass. There is tenderness.   Firm, Hypoactive BS, incision with stables, clean and dry.  Moderate purulent drainage on bandage  Musculoskeletal: Normal range of motion. She exhibits no edema.   Neurological: She is alert and oriented to person, place, and time. No cranial nerve deficit.   Skin: Skin is warm and dry. Capillary refill takes less than 2 seconds. She is not diaphoretic.   Psychiatric: discouraged but states she has no problem sitting up in chair.  Nursing note and vitals reviewed.      Significant Labs:   CBC:   Recent Labs   Lab  09/01/18   0646  09/02/18   0430   WBC  10.65  11.44   HGB  8.2*  8.5*   HCT  27.5*  28.5*   PLT  519*  488*     CMP:   Recent Labs    Lab  09/01/18   0646  09/02/18   0430   NA  136  134*   K  3.3*  3.6   CL  87*  84*   CO2  40*  40*   GLU  263*  372*   BUN  15  20   CREATININE  0.9  1.2   CALCIUM  9.5  9.0   PROT  6.0  5.8*   ALBUMIN  2.2*  2.3*   BILITOT  0.4  0.4   ALKPHOS  110  106   AST  15  20   ALT  16  14   ANIONGAP  9  10   EGFRNONAA  >60  48*       Significant Imaging: I have reviewed all pertinent imaging results/findings within the past 24 hours.    Assessment/Plan:      63 y.o. 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  Subsequently with C Diff.  Being treated until Sept. 13 with PO vanc, falgyl.     S/p Appy  - Blood cultures x 2 - Negative   - POD#10 for open appendectomy   - Abx- flagyl   - Surgical site dressing changed yesterday by surgery  - Appendix culture- positive for E coli sensitive to cipro. Received 2 days of IV cipro.   - ID consult: continue PO flagyl for bacteroides infection with vanc for C diff.  Goal stop 9/13  - No abscess per IR and surgery according to CT scan 8/31  - WBC increased today from 10 to 15, afebrile.  Will discuss with team     C. Diff Infection  - Positive c diff toxin by PCR and c diff EIA  - Per ID: continue PO vancomycin until 9/13     NIKHIL (resolved)  - BUN/Crea of 17/1.0 on admit  - BUN/Crea stable     Encephalopathy on admit   Was worked up for stroke and started on meds with concern for CVA, but negative  Appeared to rather be encephalopathy due to appendicitis  CTA head and neck unremarkable and without evidence for proximal significant stenosis or occlusion.       PT/OT: ordered and following, recommending SNF  Code: full  Diet:bariatric soft  Ppx: dvt: lovenox      Dispo: Hoping for discharge today, but now with WBC of 15.  Will have to work-up urine, respir, etc.  Still hoping for discharge soon.     Case discussed with staff        VTE Risk  Mitigation (From admission, onward)        Ordered     enoxaparin injection 40 mg  Daily      09/04/18 1400     Place sequential compression device  Until discontinued      08/23/18 0103     IP VTE HIGH RISK PATIENT  Once      08/23/18 0103              Julian Carmichael MD  Department of Hospital Medicine   [facility]

## 2018-09-05 NOTE — DISCHARGE SUMMARY
[facility] Hospital Medicine  Discharge Summary      Patient Name: Sheryl Martines  MRN: [id number]  Admission Date: 8/22/2018  Hospital Length of Stay: 13 days  Discharge Date and Time: 9/5/2018  2:51 PM  Attending Physician: Cassy Wick MD   Discharging Provider: Julian Carmichael MD  Primary Care Provider: Primary Doctor No      HPI:   63 y.o. 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 Most of the information was provided by the daughter who lives with the patient.     At around 3 pm the day PTA the daughter left and when she returned around 8 pm she noticed that her mother was altered and had slurred speech which was new. The daughter was worried that her mother took a percocet that she was recently prescribed for her pain or was having a new stroke was causing her AMS and slurred speech. The patient did not notice when her slurred speech started. The daughter also noticed that she was having trouble finding words. The daughter did not believe she fell.     The patient was recently admitted on 8/14/18 for a fall and was diagnosed with compression fractures. She did not hit her head at that time. She went to a hospital in [place] and recently was given percocets to take at home. The patient was oriented to person, place, but not time. She had trouble speaking during questioning.     CT of the head showed presumed old lacunar infarcts with no acute lesions. The daughter reports she can not get an MRI due to having a back implant. The daughter notes that the patient did not complain of new focal weakness, numbness, tongue biting, or urinary incontinence. While in the ED the patient started to shake with all limbs mildly but she could converse during these shaking events and answer some questions. She denied chest pain, SOB, fevers, nausea, vomiting and headache.         Procedure(s)  (LRB):  APPENDECTOMY, LAPAROSCOPIC---CONVERTED TO OPEN APPENDECTOMY @0950 (N/A)  APPENDECTOMY (N/A)      Hospital Course:   63 y.o. 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 Admitted for stroke rule-out. CT head neg, carotid U/S neg, CTA head and neck was also neg. MRI was not performed due to patient's back implant. Neuro was consulted. EEG was performed and showed no seizures. Neurology signed off after stroke work up proved negative. On 8/24 patient developed fever 101.7F, blood cultures, urine cultures and Xray were obtained. Patient was started on abx. CT showed possible appendicitis, and surgery was consulted for appendectomy on 8/26. Patient was nirav-ntubated after OR for respiratory failure and started on precedex and propofol.  Pulm was consulted and patient was extubated on 8/27. Patient received 1 unit of PRBCs on 8/27. Her post-transfusion Hgb 8.6 and patient was transferred to floor. Patient was able to tolerated diet and remained stable.     On 8/28 C. Diff panel ordered that resulted positive on 8/29.On 8/29 patient had episode of SOB and hypertensive urgency during PT/OT. Her duonebs were increased and she was placed on high flow O2 resolving her SOB.     On 8/30 ID consulted for for suspected infection and C. Diff. CT abdomen on 8/31 showed possible fluid collection, IR did not suspect infection. Patient remained stable.     She had slow return of bowel function with some difficulties with nausea and vomiting (more so spitting up).  KUB showed pSBO vs ileus but pt was passing gas, with bowel sounds and bowel movements, including with enema.s    On 9/3 PT/OT recommended placement in SNF, however family requested patient be discharged home with home health. It was discussed in great deal that patient was having difficulty moving between bed and toilet and needed significant support.  Daughter expressed  understanding and still wished for pt to go home.  Preparations for discharge were made.    9/5 WBC increased (10 to 15), this was discussed with surgery. They requested wound cx and follow up in wound care outpatient and felt patient was stable for discharge with good return precautions.    Patient and family were counseled of the risk of going home versus going to SNF. Patient and family were advised to increase patient activity level to prevent decconditioning at home.       She was discharged with PO vanc (stop Sept 13), clinda (1 week), and flagyl (stop Sept 13) per ID/ surgery.    Consults:   Consults (From admission, onward)        Status Ordering Provider     Inpatient consult to Anesthesiology  Once     Provider:  Sotero Chaney MD    Acknowledged HARJIT BAIRD     Inpatient consult to General Surgery  Once     Provider:  Harjit Baird MD    Acknowledged MIC SALGADO     Inpatient consult to Infectious Diseases  Once     Provider:  (Not yet assigned)    Completed FREDDIE SALGADO     Inpatient consult to Interventional Radiology  Once     Provider:  Arsenio Davies II, MD    Completed WILLA ARGUETA     Inpatient consult to Neurology  Once     Provider:  (Not yet assigned)    Completed MAYRA IZQUIERDO     Inpatient consult to Ophthalmology  Once     Provider:  Della Smith MD    Completed MAYRA IZQUIERDO     Inpatient consult to Pulmonology  Once     Provider:  Sudhakar Dorsey MD    Completed SHENA MUNGUIA     Inpatient consult to Registered Dietitian/Nutritionist  Once     Provider:  (Not yet assigned)    Completed MAYRA IZQUIERDO     IP consult to case management/social work  Once     Provider:  (Not yet assigned)    Acknowledged MAYRA IZQUIERDO        Service: Hospital Medicine    Final Active Diagnoses:    Diagnosis Date Noted POA    Clostridium difficile infection [B96.89] 08/30/2018 No    DM (diabetes mellitus) [E11.9] 08/23/2018 Yes  "   HTN (hypertension) [I10] 08/23/2018 Yes    CAD (coronary artery disease) [I25.10] 08/23/2018 Yes    Closed compression fracture of fourth lumbar vertebra [S32.040A] 08/23/2018 Yes    Opiate use [F11.90] 08/23/2018 Yes    CVA, old, hemiparesis [I69.359] 08/23/2018 Not Applicable    CVA (cerebral vascular accident) [I63.9] 08/23/2018 Yes      Problems Resolved During this Admission:    Diagnosis Date Noted Date Resolved POA    PRINCIPAL PROBLEM:  Appendicitis with peritonitis [K35.3] 08/26/2018 09/04/2018 Yes    NIKHIL (acute kidney injury) [N17.9]  09/04/2018 Unknown    Elevated troponin [R74.8]  09/04/2018 Yes    Altered mental status [R41.82] 08/23/2018 09/04/2018 Yes    Slurred speech [R47.81] 08/23/2018 09/04/2018 Yes       Discharged Condition: stable    Disposition: Home or Self Care    Follow Up:  Follow-up Information     [facility] On 9/2/2018.    Specialty:  Home Health Services  Why:  Home Health  Contact information:  [address]  [address]  [place] [address]  [phone]             Bernadine Melendrez MD On 9/13/2018.    Specialties:  General Surgery, Surgery  Why:  @2:25PM  Contact information:  [address]  [address]  [place] [facility] [address]  [phone]                 Patient Instructions:      WALKER FOR HOME USE     Order Specific Question Answer Comments   Type of Walker: Adult (5'4"-6'6")    With wheels? Yes    Height: 5' 1" (1.549 m)    Weight: 88 kg (194 lb 0.1 oz)    Length of need (1-99 months): 99    Does patient have medical equipment at home? rollator    Does patient have medical equipment at home? bedside commode    Please check all that apply: Patient is unable to safely ambulate without equipment.    Please check all that apply: Patient needs help to get in and out of chair.      BATH/SHOWER CHAIR FOR HOME USE     Order Specific Question Answer Comments   Height: 5' 1" (1.549 m)    Weight: 88 kg (194 lb 0.1 oz)    Does patient have medical " equipment at home? rollator    Does patient have medical equipment at home? bedside commode    Length of need (1-99 months): 99    Type: With back      Comprehensive metabolic panel   Standing Status: Future Standing Exp. Date: 11/03/19     CBC auto differential   Standing Status: Future Standing Exp. Date: 11/03/19     Ambulatory referral to Home Health   Referral Priority: Routine Referral Type: Home Health   Referral Reason: Specialty Services Required   Requested Specialty: Home Health Services   Number of Visits Requested: 1     Ambulatory Referral to Wound Clinic   Referral Priority: Routine Referral Type: Consultation   Referral Reason: Specialty Services Required   Requested Specialty: Wound Care   Number of Visits Requested: 1     Diet diabetic   Order Comments: Bariatric diet- 6 small meals a day     Notify your health care provider if you experience any of the following:  persistent nausea and vomiting or diarrhea     Notify your health care provider if you experience any of the following:  severe uncontrolled pain     Notify your health care provider if you experience any of the following:  redness, tenderness, or signs of infection (pain, swelling, redness, odor or green/yellow discharge around incision site)     Notify your health care provider if you experience any of the following:  difficulty breathing or increased cough     Notify your health care provider if you experience any of the following:  severe persistent headache     Notify your health care provider if you experience any of the following:  worsening rash     Notify your health care provider if you experience any of the following:  persistent dizziness, light-headedness, or visual disturbances     Notify your health care provider if you experience any of the following:  increased confusion or weakness     Notify your health care provider if you experience any of the following:   Order Comments: Temp over 101 for 6 hours not resolved with  tylenol     Activity as tolerated       Significant Diagnostic Studies: Labs:   CMP   Recent Labs   Lab  09/05/18   0522   NA  134*   K  3.6   CL  93*   CO2  32*   GLU  320*   BUN  17   CREATININE  1.0   CALCIUM  8.5*   PROT  5.6*   ALBUMIN  2.4*   BILITOT  0.4   ALKPHOS  92   AST  12   ALT  12   ANIONGAP  9   ESTGFRAFRICA  >60   EGFRNONAA  >60    and CBC   Recent Labs   Lab  09/05/18 0522   WBC  15.25*   HGB  8.7*   HCT  30.5*   PLT  596*       Pending Diagnostic Studies:     Procedure Component Value Units Date/Time    X-Ray Chest 1 View [[id number]]     Order Status:  Sent Lab Status:  No result          Medications:  Reconciled Home Medications:      Medication List      START taking these medications    aspirin 81 MG EC tablet  Commonly known as:  ECOTRIN  Take 1 tablet (81 mg total) by mouth once daily.     clindamycin 300 MG capsule  Commonly known as:  CLEOCIN  Take 1 capsule (300 mg total) by mouth 2 (two) times daily. for 7 days     diclofenac sodium 1 % Gel  Apply topically once daily. for 10 days as directed     metroNIDAZOLE 500 MG tablet  Commonly known as:  FLAGYL  Take 1 tablet (500 mg total) by mouth every 8 (eight) hours. for 9 days     ondansetron 4 MG tablet  Commonly known as:  ZOFRAN  Take 1 tablet (4 mg total) by mouth every 6 (six) hours as needed.     vancomycin 25 mg/mL solution  Take 20 mLs (500 mg total) by mouth every 6 (six) hours. for 9 days        CONTINUE taking these medications    ADVAIR DISKUS 500-50 mcg/dose Dsdv diskus inhaler  Generic drug:  fluticasone-salmeterol 500-50 mcg/dose  Inhale 1 puff into the lungs 2 (two) times daily. Controller     albuterol 0.63 mg/3 mL Nebu  Commonly known as:  ACCUNEB  Take 0.83 mg by nebulization every 6 (six) hours as needed. Rescue     baclofen 10 MG tablet  Commonly known as:  LIORESAL  Take 10 mg by mouth 3 (three) times daily.     clopidogrel 75 mg tablet  Commonly known as:  PLAVIX  Take 75 mg by mouth once daily.     COZAAR ORAL  Take  100 mg by mouth.     diltiaZEM 180 MG 24 hr capsule  Commonly known as:  CARDIZEM CD  Take 180 mg by mouth once daily.     DULoxetine 60 MG capsule  Commonly known as:  CYMBALTA  Take 60 mg by mouth once daily.     furosemide 40 MG tablet  Commonly known as:  LASIX  Take 40 mg by mouth once daily.     gabapentin 300 MG capsule  Commonly known as:  NEURONTIN  Take 300 mg by mouth 3 (three) times daily.     HUMALOG PEN SUBQ  Inject into the skin.     insulin detemir U-100 100 unit/mL injection  Commonly known as:  LEVEMIR  Inject 20 Units into the skin 2 (two) times daily.     JANUVIA ORAL  Take by mouth.     lansoprazole 30 MG capsule  Commonly known as:  PREVACID  Take 30 mg by mouth once daily.     prazosin 5 MG capsule  Commonly known as:  MINIPRESS  Take 5 mg by mouth 2 (two) times daily.     predniSONE 10 MG tablet  Commonly known as:  DELTASONE  Take 10 mg by mouth 2 (two) times daily.     simvastatin 40 MG tablet  Commonly known as:  ZOCOR  Take 40 mg by mouth every evening.     * theophylline 200 MG 12 hr tablet  Commonly known as:  THEODUR  Take 200 mg by mouth once daily.     * UNIPHYL ORAL  Take by mouth.     traMADol 100 MG Tb24  Commonly known as:  ULTRAM-ER  Take 50 mg by mouth daily as needed.     VITAMIN B-6 50 MG Tab  Generic drug:  pyridoxine (vitamin B6)  Take 50 mg by mouth once daily.         * This list has 2 medication(s) that are the same as other medications prescribed for you. Read the directions carefully, and ask your doctor or other care provider to review them with you.                Indwelling Lines/Drains at time of discharge:   Lines/Drains/Airways     Central Venous Catheter Line                 Port A Cath Single Lumen right atrial;right subclavian -- days                Time spent on the discharge of patient: 30 minutes  Patient was seen and examined on the date of discharge and determined to be suitable for discharge.         Julian Carmichael MD  Department of Hospital  Medicine  [facility]

## 2018-09-05 NOTE — PLAN OF CARE
Problem: Nutrition, Imbalanced: Inadequate Oral Intake (Adult)  Goal: Improved Oral Intake  Patient will demonstrate the desired outcomes by discharge/transition of care.  Outcome: Ongoing (interventions implemented as appropriate)  Recommendation/Intervention:   1. Add 2000 ADA restrictions to diet.   2. Add Boost Glucose Control bid.   3. Consider appetite stimulant.   4. Continue to encourage good intake at meals    Goals:   Pt will consume at least 50% intake at meals  Nutrition Goal Status: progressing towards goal  Communication of RD Recs: reviewed with RN(Katherine)

## 2018-09-05 NOTE — MEDICAL/APP STUDENT
[facility]  Infectious Disease  Consult Note     Patient Name: Sheryl Martines  MRN: [id number]  Admission Date: 8/22/2018  Hospital Length of Stay: 15 days  Attending Physician: TEJAS Wick MD  Primary Care Provider: No PCP     Isolation Status: Contact Precautions     Patient information was obtained from patient, relative(s), past medical records and ER records.       Inpatient consult to Infectious Diseases  Consult performed by: Virginia Gutierrez MD  Consult ordered by: TEJAS Wick MD  Reason for consult: C. Diff s/p appendectomy        Subjective:       Patient ID: Sheryl Martines is a 63 y.o. female.    Chief Complaint: Altered Mental Status (To ER with EMS stating that the family called for altered mental status but was awake and alert when they arrived.  pt with slurred speech and has slept alot today, taking pain medication for an injury one week ago.)    HPI   Pt is a 63 y.o. Female with pmhx of HTN, DM2, CAD, asthma, pontine stroke in 2012 with residual R sided weakness, osteoporosis. She presented to the ED on 8/22 with severe midline lower back pain and AMS s/p trip and fall the day before.  Pt was unable to get out of bed due to pain, and her daughter called EMS as pt was sluggish and they could not get her out of bed. While being evaluated for this back pain and reported AMS, pt received an MRI which identified peritonitis in the abdomen. Further imaging revealed appendicitis with localized peritonitis. She was brought in for surgery on 8/26 to have the appendix removed; per operative note, pt was cleared for a laparoscopic appendectomy but this proved impossible 2/2 adhesions from past surgeries and she was instead given an open appendectomy. Op note further states the peritonitis was localized, appendix successfully removed, and region thoroughly irrigated with abx solution. Since surgery pt reports onset of intermittent nausea, decreased appetite, and intermittent loose stool.  Wound cultures from the procedure have grown anaerobes bacteroides fragilis, bacteroides thetaiotaomicron, and eggerthella lenta; aerobes E. Coli (cipro sensitive) and enterococcus raffinosus (vanc/genta sensitive). Primary team initially treated with cipro, flagyl, and vancomycin. On 8/28 pt tested positive for clostridium difficile. Pt denied any previous episodes of c.diff and cannot remember the last time she required abx.  Pt denies any recent sick contacts. She is retired and lives with her daughter with no pets. She has never been hospitalized for an infection in the past, and reports most recent hospitalization being last year for asthma exacerbation. ID was consulted for abx regimen recs.    Interim hx:  IV cipro and vanco were discontinued and pt was started on PO vanc on 8/30 to treat for c. Diff. She was continued on IV flagyl for anaerobic coverage to prevent abdominal abscess, though this was switched to PO on 9/4. While pt remains afebrile, today there was increase in WBC from 10.78 to 15.25. Primary team has ordered repeat UA and CXR to evaluate for any further signs of infection. Their plan is to DC to SNF.     Today, pt states her nausea and vomiting have resolved. Her appetite is improving. Abdominal pain continues to be improving, and specific to RLQ incision site. She states she has been able to have BM with enema, but states she does not have BM frequently at home.       Review of Systems   Constitutional: Negative for appetite change, chills and fever.   HENT: Negative for congestion, rhinorrhea and sore throat.    Respiratory: Negative for cough, shortness of breath and wheezing.    Cardiovascular: Negative for chest pain.   Gastrointestinal: Positive for abdominal pain. Negative for abdominal distention, blood in stool, constipation, diarrhea, nausea and vomiting.   Genitourinary: Negative for difficulty urinating, dysuria and hematuria.   Skin: Positive for wound (RLQ incision site,  currently with fresh bandage). Negative for rash.   Neurological: Negative for numbness and headaches.       Objective:      Physical Exam   Constitutional: She is oriented to person, place, and time. She appears well-developed and well-nourished. No distress.   HENT:   Head: Normocephalic and atraumatic.   Mouth/Throat: Oropharynx is clear and moist and mucous membranes are normal. Oral lesions present.   Eyes: Conjunctivae and EOM are normal. Pupils are equal, round, and reactive to light.   Neck: Normal range of motion. Neck supple. No thyromegaly present.   Cardiovascular: Normal rate, regular rhythm, normal heart sounds and intact distal pulses.   Pulmonary/Chest: Effort normal and breath sounds normal. She has no wheezes. She exhibits no tenderness.   Abdominal: Soft. She exhibits no mass. Bowel sounds are decreased. There is generalized tenderness. There is rigidity.   Musculoskeletal: Normal range of motion. She exhibits no tenderness.   Lymphadenopathy:     She has no cervical adenopathy.   Neurological: She is alert and oriented to person, place, and time.   Skin: Skin is warm and dry. She is not diaphoretic.   Pt's surgical site in RLQ is currently covered by bandage placed 9/5 AM. Pt states nurse reported there continues to be clear, brownish fluid from wound.    Psychiatric: She has a normal mood and affect.       Significant Imaging:  CXR 9/5 Impression:Right-sided port with catheter terminating in the SVC.  Spinal stimulator leads in stable position.  No consolidation, pleural effusion, or pneumothorax.  Cardiomediastinal silhouette is unremarkable.    Abd XR 9/4 Impression: Distal small bowel partially obstructing process, adynamic ileus stable.  Assessment/Plan:       Pt is currently being treated appropriately with PO vanc for c. Diff, with plan to continue for 14 day course ending on 9/13. She was switched to PO flagyl yesterday to check tolerance, as pt's nausea has improved. With recent increase  in WBC, we will monitor results of pending CXR and UA, but otherwise recs will remain unchanged.     Recs:  -Continue PO vanc for 14 day course to complete on 9/13.   -PO flagyl continue for 14 days, also to be completed on 9/13  -Discharge as primary team choses

## 2018-09-05 NOTE — PROGRESS NOTES
[facility]  Infectious Disease  Progress Note    Patient Name: Sheryl Martines  MRN: [id number]  Admission Date: 8/22/2018  Length of Stay: 12 days  Attending Physician: Cassy Wick MD  Primary Care Provider: Primary Doctor No    Isolation Status: Special Contact  Assessment/Plan:      Clostridium difficile infection    Her test results, diarrhoea and leucocytosis (may be multifactorial) are concerning for cdiff infection. Would treat as non severe cdiff, first episode     Recs  - po vancomycin for 14 days from 8/30  - continue po flagyl for 14 days from 8/30          Thank you for your consult. I will follow-up with patient. Please contact us if you have any additional questions.    Virginia Gutierrez MD  Infectious Disease  [facility]    Subjective:     Principal Problem:Appendicitis with peritonitis    HPI: Sheryl Martines is a 63 y.o.  woman with a history of HTN, DM2, CAD, asthma, pontine stroke in 2012 with residual R sided weakness, osteoporosis. She presented to the ED on 8/22 with severe midline lower back pain s/p fall the day before. Also noted to have AMS, pt received imaging which was concerning for appendicitis. Further imaging revealed the extent of the appendicitis. She was started on zosyn and Vanc on 8/24, had appendectomy on 8/26 op note further states the peritonitis was localized, appendix successfully removed, and region thoroughly irrigated with abx solution. Since surgery pt reports onset of intermittent nausea, decreased appetite, and diarrhea (12-16 episodes yesterday). Wound cultures from the procedure have grown anaerobes bacteroides fragilis, bacteroides thetaiotaomicron, and eggerthella lenta; aerobes E. Coli (cipro sensitive) and enterococcus raffinosus (vanc/genta sensitive). Primary team has been treating with cipro, flagyl, and vancomycin. On 8/28 pt tested positive for clostridium difficile. Pt denies any previous episodes of c.diff and  cannot remember the last time she required abx.  Pt denies any recent sick contacts. She is retired and lives with her daughter with no pets. She has never been hospitalized for an infection in the past, and reports most recent hospitalization being last year for asthma exacerbation. ID was consulted for abx regimen recs.     Of note she is on chronic prednisone 20mg po qday for asthma      Interval History:     No nausea, no vomiting, some flatus, some drainage from wound    Review of Systems   Gastrointestinal: Positive for abdominal distention, abdominal pain and constipation.   All other systems reviewed and are negative.    Objective:     Vital Signs (Most Recent):  Temp: 97.8 °F (36.6 °C) (09/04/18 1600)  Pulse: 110 (09/04/18 1600)  Resp: 18 (09/04/18 1600)  BP: (!) 147/97 (09/04/18 1600)  SpO2: 100 % (09/04/18 1128) Vital Signs (24h Range):  Temp:  [96.6 °F (35.9 °C)-98.5 °F (36.9 °C)] 97.8 °F (36.6 °C)  Pulse:  [] 110  Resp:  [14-20] 18  SpO2:  [95 %-100 %] 100 %  BP: (137-149)/(63-97) 147/97     Weight: 88 kg (194 lb 0.1 oz)  Body mass index is 36.66 kg/m².    Estimated Creatinine Clearance: 58.1 mL/min (based on SCr of 1 mg/dL).    Physical Exam   Constitutional: She is oriented to person, place, and time. No distress.   Eyes: No scleral icterus.   Neck: No JVD present.   Cardiovascular: Normal rate, regular rhythm and normal heart sounds.   Pulmonary/Chest: She has no wheezes. She has no rales.   Abdominal: Soft. Bowel sounds are normal. There is tenderness.   Musculoskeletal: She exhibits no edema.   Neurological: She is alert and oriented to person, place, and time.   Skin: Skin is warm and dry. No rash noted. She is not diaphoretic.       Significant Labs:   BMP:   Recent Labs   Lab  09/04/18   0535   GLU  343*   NA  134*   K  3.9   CL  92*   CO2  33*   BUN  19   CREATININE  1.0   CALCIUM  8.5*   MG  1.9     CBC:   Recent Labs   Lab  09/03/18   0430  09/04/18   0535   WBC  10.73  10.78   HGB   8.6*  8.7*   HCT  28.5*  29.7*   PLT  574*  516*     CMP:   Recent Labs   Lab  09/03/18   0430  09/04/18   0535   NA  135*  134*   K  3.9  3.9   CL  88*  92*   CO2  40*  33*   GLU  309*  343*   BUN  20  19   CREATININE  1.2  1.0   CALCIUM  8.7  8.5*   PROT  5.7*  5.7*   ALBUMIN  2.3*  2.3*   BILITOT  0.5  0.4   ALKPHOS  100  95   AST  13  13   ALT  12  12   ANIONGAP  7*  9   EGFRNONAA  48*  >60       Significant Imaging: I have reviewed all pertinent imaging results/findings within the past 24 hours.

## 2018-09-06 NOTE — PHYSICIAN QUERY
PT Name: Sheryl Martines  MR #: [id number]     Physician Query Form - Diagnosis Clarification      CDS: Jen Taylor RN  Contact information: [email or web address]    This form is a permanent document in the medical record.     Query Date: September 6, 2018    By submitting this query, we are merely seeking further clarification of documentation.  Please utilize your independent clinical judgment when addressing the question(s) below.     The medical record contains the following:      Findings Supporting Clinical Information Location in Medical Record   Paralytic ileus   Her current nausea, constipation  and abd distension, in addition to her ileus on recent imaging are concerning.  Complicated CDiff can present as an ileus also    ASSESSMENT/PLAN:   Sheryl Martines is a 63 y.o. female, s/p appendectomy with paralytic ileus   Continue present treatment.surgically stable.    She had slow return of bowel function with some difficulties with nausea and vomiting (more so spitting up).  KUB showed pSBO vs ileus but pt was passing gas, with bowel sounds and bowel movements, including with enemas.     ID PN 9/2        PN 9/3- Dr. Melendrez      DC summary 9/5     Please clarify if the paralytic ileus diagnosis has been:    [  ] Ruled In  [  ] Ruled In, Now Resolved  [  ] Ruled Out  [  ] Clinically insignificant  [  ] Clinically undetermined  [ x ] Other/Clarification of findings (please specify)_clinically has paralytic ileus following surgery______________________________    Please document in your progress notes daily for the duration of treatment, until resolved, and include in your discharge summary.

## 2018-09-06 NOTE — PT/OT/SLP DISCHARGE
Physical Therapy Discharge Summary    Name: Sheryl Martines  MRN: [id number]   Principal Problem: Appendicitis with peritonitis     Patient Discharged from acute Physical Therapy on 9/5/2018.  Please refer to prior PT noted date on 9/5/2018 for functional status.     Assessment:     Patient appropriate for care in another setting.    Objective:     GOALS:   Multidisciplinary Problems     Physical Therapy Goals     Not on file          Multidisciplinary Problems (Resolved)        Problem: Physical Therapy Goal    Goal Priority Disciplines Outcome Goal Variances Interventions   Physical Therapy Goal   (Resolved)     PT, PT/OT Outcome(s) achieved     Description:  Goals to be met by: 9/27/2018     Patient will increase functional independence with mobility by performing:    Updated 8/27/2018: Re-Eval completed this date  1. Supine to sit with Modified [place]  2. Sit to supine with Modified [place]  3. Rolling to Left and Right with Modified [place].  4. Sit to stand transfer with Stand-by Assistance with AD  5. Bed to chair transfer with Stand-by Assistance with AD  6. Gait  x  feet with Stand-by Assistance using AD.   7. Ascend/Descend 8 inch curb step with Contact Guard Assistance and Minimal Assistance using AD  8. Lower extremity exercise program x10 reps with supervision              Problem: Physical Therapy Goal    Goal Priority Disciplines Outcome Goal Variances Interventions   Physical Therapy Goal   (Resolved)     PT, PT/OT Outcome(s) achieved            Problem: Physical Therapy Goal    Goal Priority Disciplines Outcome Goal Variances Interventions   Physical Therapy Goal   (Resolved)     PT, PT/OT Outcome(s) achieved                     Reasons for Discontinuation of Therapy Services  Transfer to alternate level of care.      Plan:     Patient Discharged to: Home with Home Health Service ( SNF recommended).    Renato Ayoub, PT  9/6/2018

## 2018-09-07 LAB
BACTERIA SPEC AEROBE CULT: NORMAL
BACTERIA SPEC AEROBE CULT: NORMAL

## 2018-09-10 ENCOUNTER — HOSPITAL ENCOUNTER (INPATIENT)
Facility: HOSPITAL | Age: 63
LOS: 10 days | Discharge: NURSING, REHAB OR LONG-TERM CARE | DRG: 871 | End: 2018-09-20
Attending: EMERGENCY MEDICINE | Admitting: FAMILY MEDICINE
Payer: MEDICARE

## 2018-09-10 DIAGNOSIS — A49.02 MRSA INFECTION: ICD-10-CM

## 2018-09-10 DIAGNOSIS — I95.9 HYPOTENSION: ICD-10-CM

## 2018-09-10 DIAGNOSIS — E11.21 TYPE 2 DIABETES MELLITUS WITH DIABETIC NEPHROPATHY, WITH LONG-TERM CURRENT USE OF INSULIN: ICD-10-CM

## 2018-09-10 DIAGNOSIS — R63.0 DECREASED APPETITE: ICD-10-CM

## 2018-09-10 DIAGNOSIS — Z79.4 TYPE 2 DIABETES MELLITUS WITH DIABETIC NEPHROPATHY, WITH LONG-TERM CURRENT USE OF INSULIN: ICD-10-CM

## 2018-09-10 DIAGNOSIS — R09.02 HYPOXIA: ICD-10-CM

## 2018-09-10 DIAGNOSIS — I25.10 CORONARY ARTERY DISEASE, ANGINA PRESENCE UNSPECIFIED, UNSPECIFIED VESSEL OR LESION TYPE, UNSPECIFIED WHETHER NATIVE OR TRANSPLANTED HEART: ICD-10-CM

## 2018-09-10 DIAGNOSIS — D64.9 ANEMIA, UNSPECIFIED TYPE: ICD-10-CM

## 2018-09-10 DIAGNOSIS — E11.59 HYPERTENSION ASSOCIATED WITH DIABETES: ICD-10-CM

## 2018-09-10 DIAGNOSIS — R53.81 DEBILITATED PATIENT: ICD-10-CM

## 2018-09-10 DIAGNOSIS — R50.9 FEVER, UNSPECIFIED FEVER CAUSE: Primary | ICD-10-CM

## 2018-09-10 DIAGNOSIS — I95.9 HYPOTENSION, UNSPECIFIED HYPOTENSION TYPE: ICD-10-CM

## 2018-09-10 DIAGNOSIS — R06.82 TACHYPNEA: ICD-10-CM

## 2018-09-10 DIAGNOSIS — J45.40 MODERATE PERSISTENT ASTHMA WITHOUT COMPLICATION: ICD-10-CM

## 2018-09-10 DIAGNOSIS — R62.7 ADULT FAILURE TO THRIVE: ICD-10-CM

## 2018-09-10 DIAGNOSIS — I50.22 CHRONIC SYSTOLIC HEART FAILURE: ICD-10-CM

## 2018-09-10 DIAGNOSIS — Z90.49 S/P APPENDECTOMY: ICD-10-CM

## 2018-09-10 DIAGNOSIS — I69.359 CVA, OLD, HEMIPARESIS: ICD-10-CM

## 2018-09-10 DIAGNOSIS — R63.8 DECREASED ORAL INTAKE: ICD-10-CM

## 2018-09-10 DIAGNOSIS — J98.11 ATELECTASIS: ICD-10-CM

## 2018-09-10 DIAGNOSIS — A49.8 CLOSTRIDIUM DIFFICILE INFECTION: ICD-10-CM

## 2018-09-10 DIAGNOSIS — I15.2 HYPERTENSION ASSOCIATED WITH DIABETES: ICD-10-CM

## 2018-09-10 LAB
ALBUMIN SERPL BCP-MCNC: 2.3 G/DL
ALP SERPL-CCNC: 97 U/L
ALT SERPL W/O P-5'-P-CCNC: 9 U/L
ANION GAP SERPL CALC-SCNC: 9 MMOL/L
AST SERPL-CCNC: 12 U/L
BACTERIA #/AREA URNS HPF: ABNORMAL /HPF
BASOPHILS # BLD AUTO: 0.01 K/UL
BASOPHILS NFR BLD: 0.1 %
BILIRUB SERPL-MCNC: 0.5 MG/DL
BILIRUB UR QL STRIP: ABNORMAL
BUN SERPL-MCNC: 7 MG/DL
CALCIUM SERPL-MCNC: 9.1 MG/DL
CHLORIDE SERPL-SCNC: 94 MMOL/L
CLARITY UR: ABNORMAL
CO2 SERPL-SCNC: 31 MMOL/L
COLOR UR: ABNORMAL
CREAT SERPL-MCNC: 0.9 MG/DL
DIFFERENTIAL METHOD: ABNORMAL
EOSINOPHIL # BLD AUTO: 0.2 K/UL
EOSINOPHIL NFR BLD: 1.5 %
ERYTHROCYTE [DISTWIDTH] IN BLOOD BY AUTOMATED COUNT: 18.6 %
EST. GFR  (AFRICAN AMERICAN): >60 ML/MIN/1.73 M^2
EST. GFR  (NON AFRICAN AMERICAN): >60 ML/MIN/1.73 M^2
GLUCOSE SERPL-MCNC: 170 MG/DL
GLUCOSE UR QL STRIP: ABNORMAL
HCT VFR BLD AUTO: 32.5 %
HGB BLD-MCNC: 9.6 G/DL
HGB UR QL STRIP: ABNORMAL
HYALINE CASTS #/AREA URNS LPF: 0 /LPF
KETONES UR QL STRIP: NEGATIVE
LEUKOCYTE ESTERASE UR QL STRIP: ABNORMAL
LYMPHOCYTES # BLD AUTO: 2.6 K/UL
LYMPHOCYTES NFR BLD: 22.8 %
MCH RBC QN AUTO: 24.2 PG
MCHC RBC AUTO-ENTMCNC: 29.5 G/DL
MCV RBC AUTO: 82 FL
MICROSCOPIC COMMENT: ABNORMAL
MONOCYTES # BLD AUTO: 1.9 K/UL
MONOCYTES NFR BLD: 16.8 %
NEUTROPHILS # BLD AUTO: 6.6 K/UL
NEUTROPHILS NFR BLD: 58.4 %
NITRITE UR QL STRIP: POSITIVE
PH UR STRIP: 6 [PH] (ref 5–8)
PLATELET # BLD AUTO: 464 K/UL
PMV BLD AUTO: 8.6 FL
POCT GLUCOSE: 161 MG/DL (ref 70–110)
POTASSIUM SERPL-SCNC: 3.5 MMOL/L
PROT SERPL-MCNC: 5.9 G/DL
PROT UR QL STRIP: ABNORMAL
RBC # BLD AUTO: 3.97 M/UL
RBC #/AREA URNS HPF: 50 /HPF (ref 0–4)
SODIUM SERPL-SCNC: 134 MMOL/L
SP GR UR STRIP: >=1.03 (ref 1–1.03)
SQUAMOUS #/AREA URNS HPF: 30 /HPF
URN SPEC COLLECT METH UR: ABNORMAL
UROBILINOGEN UR STRIP-ACNC: NEGATIVE EU/DL
WBC # BLD AUTO: 11.34 K/UL
WBC #/AREA URNS HPF: 9 /HPF (ref 0–5)

## 2018-09-10 PROCEDURE — 85025 COMPLETE CBC W/AUTO DIFF WBC: CPT

## 2018-09-10 PROCEDURE — 83690 ASSAY OF LIPASE: CPT

## 2018-09-10 PROCEDURE — 96365 THER/PROPH/DIAG IV INF INIT: CPT

## 2018-09-10 PROCEDURE — 81000 URINALYSIS NONAUTO W/SCOPE: CPT

## 2018-09-10 PROCEDURE — 99285 EMERGENCY DEPT VISIT HI MDM: CPT | Mod: 25

## 2018-09-10 PROCEDURE — 25000003 PHARM REV CODE 250: Performed by: EMERGENCY MEDICINE

## 2018-09-10 PROCEDURE — 85610 PROTHROMBIN TIME: CPT

## 2018-09-10 PROCEDURE — 25500020 PHARM REV CODE 255: Performed by: EMERGENCY MEDICINE

## 2018-09-10 PROCEDURE — G0378 HOSPITAL OBSERVATION PER HR: HCPCS

## 2018-09-10 PROCEDURE — 87077 CULTURE AEROBIC IDENTIFY: CPT

## 2018-09-10 PROCEDURE — 12000002 HC ACUTE/MED SURGE SEMI-PRIVATE ROOM

## 2018-09-10 PROCEDURE — 80053 COMPREHEN METABOLIC PANEL: CPT

## 2018-09-10 PROCEDURE — 87070 CULTURE OTHR SPECIMN AEROBIC: CPT

## 2018-09-10 PROCEDURE — 84145 PROCALCITONIN (PCT): CPT

## 2018-09-10 PROCEDURE — 63600175 PHARM REV CODE 636 W HCPCS: Performed by: EMERGENCY MEDICINE

## 2018-09-10 PROCEDURE — 85730 THROMBOPLASTIN TIME PARTIAL: CPT

## 2018-09-10 PROCEDURE — 87186 SC STD MICRODIL/AGAR DIL: CPT

## 2018-09-10 PROCEDURE — 82962 GLUCOSE BLOOD TEST: CPT

## 2018-09-10 PROCEDURE — 80198 ASSAY OF THEOPHYLLINE: CPT

## 2018-09-10 RX ORDER — ZINC OXIDE 20 G/100G
OINTMENT TOPICAL
Status: DISCONTINUED | OUTPATIENT
Start: 2018-09-10 | End: 2018-09-20 | Stop reason: HOSPADM

## 2018-09-10 RX ADMIN — PROMETHAZINE HYDROCHLORIDE 25 MG: 25 INJECTION INTRAMUSCULAR; INTRAVENOUS at 10:09

## 2018-09-10 RX ADMIN — IOHEXOL 75 ML: 350 INJECTION, SOLUTION INTRAVENOUS at 08:09

## 2018-09-10 RX ADMIN — SODIUM CHLORIDE, SODIUM LACTATE, POTASSIUM CHLORIDE, AND CALCIUM CHLORIDE 1000 ML: .6; .31; .03; .02 INJECTION, SOLUTION INTRAVENOUS at 10:09

## 2018-09-10 NOTE — ED NOTES
APPEARANCE: Alert, oriented and in no acute distress.  CARDIAC: Normal rate and rhythm, no murmur heard.   PERIPHERAL VASCULAR: peripheral pulses present. Normal cap refill. No edema. Warm to touch.    RESPIRATORY:Normal rate and effort, breath sounds clear bilaterally throughout chest. Respirations are equal and unlabored no obvious signs of distress.  GASTRO:  Incision to left lower quadrant. Incision has staples, erythema noted around edges, small opening to incision with Serosanguinous drainage in dressing.    MUSC: Full ROM. No bony tenderness or soft tissue tenderness. No obvious deformity.  SKIN: Skin is warm and dry, normal skin turgor, mucous membranes moist.  NEURO: 5/5 strength major flexors/extensors bilaterally. Sensory intact to light touch bilaterally. Gilbert coma scale: eyes open spontaneously-4, oriented & converses-5, obeys commands-6. No neurological abnormalities.   MENTAL STATUS: awake, alert and aware of environment.  EYE: PERRL, both eyes: pupils brisk and reactive to light. Normal size.  ENT: EARS: no obvious drainage. NOSE: no active bleeding.

## 2018-09-10 NOTE — ED PROVIDER NOTES
Encounter Date: 9/10/2018    SCRIBE #1 NOTE: I, Cindi Randolph, am scribing for, and in the presence of, . I have scribed the entire note.     I, Dr. Jade Davila MD, personally performed the services described in this documentation. All medical record entries made by the scribe were at my direction and in my presence.  I have reviewed the chart and agree that the record reflects my personal performance and is accurate and complete. Jade Davila MD.    History     Chief Complaint   Patient presents with    Abdominal Pain     appendectomy last week, c/o pain and drainage from site, + fever     CHIEF COMPLAINT: Patient presents with:  Abdominal Pain: appendectomy last week, c/o pain and drainage from site, + fever      HISTORY OF PRESENT ILLNESS: Sheryl Martines  is a 63 y.o. female, who underwent open appendectomy surgery on  08/26 with Dr. Melendrez, presents to the emergency department today  for increase drainage from the incision site and fever of 100.0 that was noted today. Drainage has been present for two days, she reports that it is clear and pinkish in color without puss. Patient not experiencing any pain except for expected post operative pain. She denies any nausea, vomiting, diarrhea, or any other associated symptoms.         ALLERGIES REVIEWED  MEDICATIONS REVIEWED  PMH/PSH/SOC/FH REVIEWED     The history is provided by the patient.    Nursing/Ancillary staff note reviewed.          Review of patient's allergies indicates:   Allergen Reactions    Ace inhibitors Swelling    Corticosteroids (glucocorticoids)     Hydralazine analogues     Tetracyclines Swelling    Travatan (with benzalkonium) [travoprost (benzalkonium)]      Past Medical History:   Diagnosis Date    Asthma     Closed compression fracture of fourth lumbar vertebra     COPD (chronic obstructive pulmonary disease)     Coronary artery disease     Diabetes mellitus     Glaucoma     High cholesterol     Hypertension     Iritis      Pulmonary embolus     Stroke     rt sided weakness.     Past Surgical History:   Procedure Laterality Date    ABDOMINAL SURGERY      APPENDECTOMY N/A 8/26/2018    Procedure: APPENDECTOMY;  Surgeon: Bernadine Melendrez MD;  Location: [facility] OR;  Service: General;  Laterality: N/A;    APPENDECTOMY N/A 8/26/2018    Performed by [facility]rez MD at [facility] OR    APPENDECTOMY, LAPAROSCOPIC---CONVERTED TO OPEN APPENDECTOMY @0950 N/A 8/26/2018    Performed by [facility]rez [facility] at [facility]    BACK SURGERY      stimulator    CATARACT EXTRACTION      HYSTERECTOMY      LAPAROSCOPIC APPENDECTOMY N/A 8/26/2018    Procedure: APPENDECTOMY, LAPAROSCOPIC---CONVERTED TO OPEN APPENDECTOMY @0950;  Surgeon: Bernadine Melendrez MD;  Location: [facility] OR;  Service: General;  Laterality: N/A;     Family History   Problem Relation Age of Onset    Cancer Father     Diabetes Brother      Social History     Tobacco Use    Smoking status: Never Smoker   Substance Use Topics    Alcohol use: No     Frequency: Never    Drug use: No     Review of Systems   Constitutional: Positive for fever. Negative for activity change, appetite change, chills and diaphoresis.   HENT: Negative for congestion, drooling, ear pain, mouth sores, rhinorrhea, sinus pain, sore throat and trouble swallowing.    Eyes: Negative for pain and discharge.   Respiratory: Negative for cough, chest tightness, shortness of breath, wheezing and stridor.    Cardiovascular: Negative for chest pain, palpitations and leg swelling.   Gastrointestinal: Negative for abdominal distention, abdominal pain, blood in stool, constipation, diarrhea, nausea and vomiting.   Genitourinary: Negative for difficulty urinating, dysuria, flank pain, frequency, hematuria and urgency.   Musculoskeletal: Negative for arthralgias, back pain and myalgias.   Skin: Positive for color change. Negative for pallor, rash and wound.   Neurological: Negative for dizziness, syncope, weakness,  light-headedness and numbness.   All other systems reviewed and are negative.      Physical Exam     Initial Vitals [09/10/18 1704]   BP Pulse Resp Temp SpO2   117/75 102 16 98.7 °F (37.1 °C) 98 %      MAP       --         Physical Exam    Nursing note and vitals reviewed.  Constitutional: She appears well-developed and well-nourished.   HENT:   Head: Normocephalic and atraumatic.   Right Ear: External ear normal.   Left Ear: External ear normal.   Nose: Nose normal.   Mouth/Throat: Oropharynx is clear and moist.   Eyes: Conjunctivae and EOM are normal. Pupils are equal, round, and reactive to light. No scleral icterus.   Neck: Normal range of motion. Neck supple. No JVD present.   Cardiovascular: Normal rate, regular rhythm, normal heart sounds and intact distal pulses. Exam reveals no gallop and no friction rub.    No murmur heard.  Pulmonary/Chest: Breath sounds normal. No stridor. No respiratory distress. She has no wheezes. She exhibits no tenderness.   Abdominal: Soft. Bowel sounds are normal. She exhibits no distension and no mass. There is no tenderness. There is no rebound and no guarding.   Musculoskeletal: Normal range of motion. She exhibits no edema or tenderness.   Back is nontender to palpation.    Neurological: She is alert and oriented to person, place, and time. She has normal strength. No cranial nerve deficit.   Skin: Skin is warm and dry. Capillary refill takes less than 2 seconds. No rash noted. There is erythema. No pallor.   Incision has staples, some slight erythema around edges   No induration upon palpation   Serosanguinous drainage in dressing, but no purulent material appreciated   Appropriately tender to palpation       Psychiatric: She has a normal mood and affect. Thought content normal.         ED Course   Procedures  Labs Reviewed   CBC W/ AUTO DIFFERENTIAL - Abnormal; Notable for the following components:       Result Value    RBC 3.97 (*)     Hemoglobin 9.6 (*)     Hematocrit 32.5  (*)     MCH 24.2 (*)     MCHC 29.5 (*)     RDW 18.6 (*)     Platelets 464 (*)     MPV 8.6 (*)     Mono # 1.9 (*)     Mono% 16.8 (*)     All other components within normal limits   COMPREHENSIVE METABOLIC PANEL - Abnormal; Notable for the following components:    Sodium 134 (*)     Chloride 94 (*)     CO2 31 (*)     Glucose 170 (*)     BUN, Bld 7 (*)     Total Protein 5.9 (*)     Albumin 2.3 (*)     ALT 9 (*)     All other components within normal limits   URINALYSIS - Abnormal; Notable for the following components:    Color, UA Orange (*)     Appearance, UA Cloudy (*)     Specific Gravity, UA >=1.030 (*)     Protein, UA 2+ (*)     Glucose, UA Trace (*)     Bilirubin (UA) 1+ (*)     Occult Blood UA Trace (*)     Nitrite, UA Positive (*)     Leukocytes, UA Trace (*)     All other components within normal limits   URINALYSIS MICROSCOPIC - Abnormal; Notable for the following components:    RBC, UA 50 (*)     WBC, UA 9 (*)     Bacteria, UA Few (*)     All other components within normal limits   POCT GLUCOSE - Abnormal; Notable for the following components:    POCT Glucose 161 (*)     All other components within normal limits   CULTURE, AEROBIC  (SPECIFY SOURCE)   THEOPHYLLINE LEVEL   LIPASE   PROCALCITONIN   PROTIME-INR   APTT            X-Rays:   Independently Interpreted Readings:   Other Readings:  Reviewed by myself, read by radiology.         Medical Decision Making:   Initial Assessment:   This is a 63 y.o. female  who presents to the ED today for evaluation of recent surgical site. Spoke with Dr. Melendrez immnaty upon arrival. He would like CT abdomen/pelvis and a would culture. Will obtain, report results, and reassess.     Differential Diagnosis:   Gastroenteritis, gastritis, ulcer, cholecystitis, gallstones, pancreatitis, ileus, small bowel obstruction, appendicitis, constipation.     Clinical Tests:   Lab Tests: Reviewed and Ordered  Radiological Study: Reviewed and Ordered        Pt care turned over to   Pores at shift change awaiting results of labs and CT scan. Case discussed at length aware that I have spoken with Dr Melendrez. He will make the final disposition appropriately along with consultation with Dr Melendrez.                Clinical Impression:     1. Fever, unspecified fever cause    2. Debilitated patient                                   Jade Davila MD  09/11/18 1126       Jade Davila MD  09/11/18 1121

## 2018-09-11 ENCOUNTER — ANESTHESIA (OUTPATIENT)
Dept: INTENSIVE CARE | Facility: HOSPITAL | Age: 63
DRG: 871 | End: 2018-09-11
Payer: MEDICARE

## 2018-09-11 ENCOUNTER — ANESTHESIA EVENT (OUTPATIENT)
Dept: INTENSIVE CARE | Facility: HOSPITAL | Age: 63
DRG: 871 | End: 2018-09-11
Payer: MEDICARE

## 2018-09-11 PROBLEM — R63.8 INADEQUATE ORAL INTAKE: Status: ACTIVE | Noted: 2018-09-11

## 2018-09-11 PROBLEM — I50.20 HFREF (HEART FAILURE WITH REDUCED EJECTION FRACTION): Status: ACTIVE | Noted: 2018-09-11

## 2018-09-11 PROBLEM — R09.02 HYPOXIA: Status: ACTIVE | Noted: 2018-09-11

## 2018-09-11 PROBLEM — Z90.49 S/P APPENDECTOMY: Status: ACTIVE | Noted: 2018-09-11

## 2018-09-11 PROBLEM — R50.9 FEVER: Status: ACTIVE | Noted: 2018-09-11

## 2018-09-11 PROBLEM — R63.8 DECREASED ORAL INTAKE: Status: ACTIVE | Noted: 2018-09-11

## 2018-09-11 PROBLEM — J45.909 MODERATE ASTHMA WITHOUT COMPLICATION: Status: ACTIVE | Noted: 2018-09-11

## 2018-09-11 LAB
ALBUMIN SERPL BCP-MCNC: 1.9 G/DL
ALBUMIN SERPL BCP-MCNC: 2.2 G/DL
ALLENS TEST: ABNORMAL
ALP SERPL-CCNC: 83 U/L
ALP SERPL-CCNC: 97 U/L
ALT SERPL W/O P-5'-P-CCNC: 10 U/L
ALT SERPL W/O P-5'-P-CCNC: 8 U/L
ANION GAP SERPL CALC-SCNC: 11 MMOL/L
ANION GAP SERPL CALC-SCNC: 7 MMOL/L
ANISOCYTOSIS BLD QL SMEAR: SLIGHT
ANISOCYTOSIS BLD QL SMEAR: SLIGHT
APTT BLDCRRT: 28.7 SEC
AST SERPL-CCNC: 11 U/L
AST SERPL-CCNC: 15 U/L
BACTERIA SPEC ANAEROBE CULT: NORMAL
BACTERIA SPEC ANAEROBE CULT: NORMAL
BASOPHILS # BLD AUTO: 0.02 K/UL
BASOPHILS # BLD AUTO: 0.02 K/UL
BASOPHILS NFR BLD: 0.2 %
BASOPHILS NFR BLD: 0.2 %
BILIRUB SERPL-MCNC: 0.4 MG/DL
BILIRUB SERPL-MCNC: 0.5 MG/DL
BUN SERPL-MCNC: 6 MG/DL
BUN SERPL-MCNC: 6 MG/DL
C DIFF GDH STL QL: NEGATIVE
C DIFF TOX A+B STL QL IA: NEGATIVE
CALCIUM SERPL-MCNC: 8.2 MG/DL
CALCIUM SERPL-MCNC: 8.9 MG/DL
CHLORIDE SERPL-SCNC: 95 MMOL/L
CHLORIDE SERPL-SCNC: 95 MMOL/L
CO2 SERPL-SCNC: 25 MMOL/L
CO2 SERPL-SCNC: 28 MMOL/L
CREAT SERPL-MCNC: 0.8 MG/DL
CREAT SERPL-MCNC: 0.9 MG/DL
D DIMER PPP IA.FEU-MCNC: 4.42 MG/L FEU
DELSYS: ABNORMAL
DIFFERENTIAL METHOD: ABNORMAL
DIFFERENTIAL METHOD: ABNORMAL
EOSINOPHIL # BLD AUTO: 0.2 K/UL
EOSINOPHIL # BLD AUTO: 0.2 K/UL
EOSINOPHIL NFR BLD: 1.8 %
EOSINOPHIL NFR BLD: 2 %
ERYTHROCYTE [DISTWIDTH] IN BLOOD BY AUTOMATED COUNT: 18.6 %
ERYTHROCYTE [DISTWIDTH] IN BLOOD BY AUTOMATED COUNT: 18.8 %
EST. GFR  (AFRICAN AMERICAN): >60 ML/MIN/1.73 M^2
EST. GFR  (AFRICAN AMERICAN): >60 ML/MIN/1.73 M^2
EST. GFR  (NON AFRICAN AMERICAN): >60 ML/MIN/1.73 M^2
EST. GFR  (NON AFRICAN AMERICAN): >60 ML/MIN/1.73 M^2
FIO2: 30
FIO2: 30
GLUCOSE SERPL-MCNC: 221 MG/DL
GLUCOSE SERPL-MCNC: 334 MG/DL
HCO3 UR-SCNC: 30.8 MMOL/L (ref 24–28)
HCO3 UR-SCNC: 31.1 MMOL/L (ref 24–28)
HCO3 UR-SCNC: 31.3 MMOL/L (ref 24–28)
HCT VFR BLD AUTO: 27.1 %
HCT VFR BLD AUTO: 32.7 %
HGB BLD-MCNC: 8.1 G/DL
HGB BLD-MCNC: 9.7 G/DL
HYPOCHROMIA BLD QL SMEAR: ABNORMAL
INR PPP: 1.1
LACTATE SERPL-SCNC: 1.5 MMOL/L
LACTATE SERPL-SCNC: 2.3 MMOL/L
LIPASE SERPL-CCNC: 41 U/L
LYMPHOCYTES # BLD AUTO: 1.8 K/UL
LYMPHOCYTES # BLD AUTO: 2.8 K/UL
LYMPHOCYTES NFR BLD: 22 %
LYMPHOCYTES NFR BLD: 25.2 %
MAGNESIUM SERPL-MCNC: 1.5 MG/DL
MCH RBC QN AUTO: 24.4 PG
MCH RBC QN AUTO: 24.7 PG
MCHC RBC AUTO-ENTMCNC: 29.7 G/DL
MCHC RBC AUTO-ENTMCNC: 29.9 G/DL
MCV RBC AUTO: 82 FL
MCV RBC AUTO: 83 FL
MIN VOL: 6
MODE: ABNORMAL
MODE: ABNORMAL
MONOCYTES # BLD AUTO: 1.4 K/UL
MONOCYTES # BLD AUTO: 1.8 K/UL
MONOCYTES NFR BLD: 15.8 %
MONOCYTES NFR BLD: 17 %
NEUTROPHILS # BLD AUTO: 4.8 K/UL
NEUTROPHILS # BLD AUTO: 6.3 K/UL
NEUTROPHILS NFR BLD: 56.6 %
NEUTROPHILS NFR BLD: 58.8 %
OB PNL STL: NEGATIVE
OVALOCYTES BLD QL SMEAR: ABNORMAL
PCO2 BLDA: 44.5 MMHG (ref 35–45)
PCO2 BLDA: 48.6 MMHG (ref 35–45)
PCO2 BLDA: 50.8 MMHG (ref 35–45)
PEEP: 10
PEEP: 10
PH SMN: 7.39 [PH] (ref 7.35–7.45)
PH SMN: 7.42 [PH] (ref 7.35–7.45)
PH SMN: 7.45 [PH] (ref 7.35–7.45)
PHOSPHATE SERPL-MCNC: 2.6 MG/DL
PLATELET # BLD AUTO: 345 K/UL
PLATELET # BLD AUTO: 446 K/UL
PLATELET BLD QL SMEAR: ABNORMAL
PMV BLD AUTO: 8.7 FL
PMV BLD AUTO: 8.8 FL
PO2 BLDA: 100 MMHG (ref 80–100)
PO2 BLDA: 109 MMHG (ref 80–100)
PO2 BLDA: 48 MMHG (ref 80–100)
POC BE: 6 MMOL/L
POC BE: 7 MMOL/L
POC BE: 7 MMOL/L
POC SATURATED O2: 83 % (ref 95–100)
POC SATURATED O2: 98 % (ref 95–100)
POC SATURATED O2: 98 % (ref 95–100)
POC TCO2: 32 MMOL/L (ref 23–27)
POC TCO2: 33 MMOL/L (ref 23–27)
POC TCO2: 33 MMOL/L (ref 23–27)
POCT GLUCOSE: 310 MG/DL (ref 70–110)
POCT GLUCOSE: 339 MG/DL (ref 70–110)
POIKILOCYTOSIS BLD QL SMEAR: SLIGHT
POIKILOCYTOSIS BLD QL SMEAR: SLIGHT
POLYCHROMASIA BLD QL SMEAR: ABNORMAL
POLYCHROMASIA BLD QL SMEAR: ABNORMAL
POTASSIUM SERPL-SCNC: 3.7 MMOL/L
POTASSIUM SERPL-SCNC: 3.9 MMOL/L
PROCALCITONIN SERPL IA-MCNC: 0.69 NG/ML
PROCALCITONIN SERPL IA-MCNC: 0.71 NG/ML
PROT SERPL-MCNC: 4.8 G/DL
PROT SERPL-MCNC: 5.8 G/DL
PROTHROMBIN TIME: 11.9 SEC
RBC # BLD AUTO: 3.28 M/UL
RBC # BLD AUTO: 3.97 M/UL
SAMPLE: ABNORMAL
SITE: ABNORMAL
SODIUM SERPL-SCNC: 130 MMOL/L
SODIUM SERPL-SCNC: 131 MMOL/L
SP02: 100
SP02: 97
SPONT RATE: 24
SPONT RATE: 26
TARGETS BLD QL SMEAR: ABNORMAL
THEOPHYLLINE SERPL-MCNC: <2 UG/ML
THEOPHYLLINE SERPL-MCNC: <2 UG/ML
TROPONIN I SERPL DL<=0.01 NG/ML-MCNC: 0.08 NG/ML
VANCOMYCIN SERPL-MCNC: <1.1 UG/ML
VANCOMYCIN TROUGH SERPL-MCNC: <1.1 UG/ML
WBC # BLD AUTO: 11.11 K/UL
WBC # BLD AUTO: 8.12 K/UL

## 2018-09-11 PROCEDURE — 80202 ASSAY OF VANCOMYCIN: CPT

## 2018-09-11 PROCEDURE — 25000003 PHARM REV CODE 250: Performed by: EMERGENCY MEDICINE

## 2018-09-11 PROCEDURE — 25000003 PHARM REV CODE 250: Performed by: ANESTHESIOLOGY

## 2018-09-11 PROCEDURE — G8982 BODY POS GOAL STATUS: HCPCS | Mod: CJ

## 2018-09-11 PROCEDURE — 87086 URINE CULTURE/COLONY COUNT: CPT

## 2018-09-11 PROCEDURE — 36569 INSJ PICC 5 YR+ W/O IMAGING: CPT | Mod: 59

## 2018-09-11 PROCEDURE — G8981 BODY POS CURRENT STATUS: HCPCS | Mod: CM

## 2018-09-11 PROCEDURE — 80053 COMPREHEN METABOLIC PANEL: CPT | Mod: 91

## 2018-09-11 PROCEDURE — 83735 ASSAY OF MAGNESIUM: CPT

## 2018-09-11 PROCEDURE — 25000003 PHARM REV CODE 250: Performed by: FAMILY MEDICINE

## 2018-09-11 PROCEDURE — 63600175 PHARM REV CODE 636 W HCPCS: Performed by: STUDENT IN AN ORGANIZED HEALTH CARE EDUCATION/TRAINING PROGRAM

## 2018-09-11 PROCEDURE — 87040 BLOOD CULTURE FOR BACTERIA: CPT | Mod: 59

## 2018-09-11 PROCEDURE — 83605 ASSAY OF LACTIC ACID: CPT | Mod: 91

## 2018-09-11 PROCEDURE — 84484 ASSAY OF TROPONIN QUANT: CPT

## 2018-09-11 PROCEDURE — 85379 FIBRIN DEGRADATION QUANT: CPT

## 2018-09-11 PROCEDURE — C1751 CATH, INF, PER/CENT/MIDLINE: HCPCS | Performed by: ANESTHESIOLOGY

## 2018-09-11 PROCEDURE — 76937 US GUIDE VASCULAR ACCESS: CPT | Performed by: ANESTHESIOLOGY

## 2018-09-11 PROCEDURE — 36600 WITHDRAWAL OF ARTERIAL BLOOD: CPT

## 2018-09-11 PROCEDURE — 36556 INSERT NON-TUNNEL CV CATH: CPT

## 2018-09-11 PROCEDURE — 85025 COMPLETE CBC W/AUTO DIFF WBC: CPT

## 2018-09-11 PROCEDURE — G8988 SELF CARE GOAL STATUS: HCPCS | Mod: CK

## 2018-09-11 PROCEDURE — 80198 ASSAY OF THEOPHYLLINE: CPT

## 2018-09-11 PROCEDURE — 82803 BLOOD GASES ANY COMBINATION: CPT

## 2018-09-11 PROCEDURE — 25000003 PHARM REV CODE 250: Performed by: STUDENT IN AN ORGANIZED HEALTH CARE EDUCATION/TRAINING PROGRAM

## 2018-09-11 PROCEDURE — 20000000 HC ICU ROOM

## 2018-09-11 PROCEDURE — 87324 CLOSTRIDIUM AG IA: CPT

## 2018-09-11 PROCEDURE — 63600175 PHARM REV CODE 636 W HCPCS: Performed by: FAMILY MEDICINE

## 2018-09-11 PROCEDURE — 51702 INSERT TEMP BLADDER CATH: CPT

## 2018-09-11 PROCEDURE — 99900035 HC TECH TIME PER 15 MIN (STAT)

## 2018-09-11 PROCEDURE — 94640 AIRWAY INHALATION TREATMENT: CPT

## 2018-09-11 PROCEDURE — S5010 5% DEXTROSE AND 0.45% SALINE: HCPCS | Performed by: STUDENT IN AN ORGANIZED HEALTH CARE EDUCATION/TRAINING PROGRAM

## 2018-09-11 PROCEDURE — 84145 PROCALCITONIN (PCT): CPT

## 2018-09-11 PROCEDURE — 27800505 HC CATH, RADIAL ARTERY KIT: Performed by: ANESTHESIOLOGY

## 2018-09-11 PROCEDURE — 94799 UNLISTED PULMONARY SVC/PX: CPT

## 2018-09-11 PROCEDURE — 80202 ASSAY OF VANCOMYCIN: CPT | Mod: 91

## 2018-09-11 PROCEDURE — 82272 OCCULT BLD FECES 1-3 TESTS: CPT

## 2018-09-11 PROCEDURE — 25500020 PHARM REV CODE 255: Performed by: FAMILY MEDICINE

## 2018-09-11 PROCEDURE — G8987 SELF CARE CURRENT STATUS: HCPCS | Mod: CL

## 2018-09-11 PROCEDURE — 36415 COLL VENOUS BLD VENIPUNCTURE: CPT

## 2018-09-11 PROCEDURE — 94660 CPAP INITIATION&MGMT: CPT

## 2018-09-11 PROCEDURE — 84100 ASSAY OF PHOSPHORUS: CPT

## 2018-09-11 PROCEDURE — 25000242 PHARM REV CODE 250 ALT 637 W/ HCPCS: Performed by: STUDENT IN AN ORGANIZED HEALTH CARE EDUCATION/TRAINING PROGRAM

## 2018-09-11 PROCEDURE — 94761 N-INVAS EAR/PLS OXIMETRY MLT: CPT

## 2018-09-11 PROCEDURE — 97161 PT EVAL LOW COMPLEX 20 MIN: CPT

## 2018-09-11 PROCEDURE — 97165 OT EVAL LOW COMPLEX 30 MIN: CPT

## 2018-09-11 PROCEDURE — 27000221 HC OXYGEN, UP TO 24 HOURS

## 2018-09-11 PROCEDURE — 36000 PLACE NEEDLE IN VEIN: CPT | Performed by: ANESTHESIOLOGY

## 2018-09-11 PROCEDURE — 36620 INSERTION CATHETER ARTERY: CPT

## 2018-09-11 PROCEDURE — 83605 ASSAY OF LACTIC ACID: CPT

## 2018-09-11 RX ORDER — RAMELTEON 8 MG/1
8 TABLET ORAL NIGHTLY PRN
Status: DISCONTINUED | OUTPATIENT
Start: 2018-09-11 | End: 2018-09-20 | Stop reason: HOSPADM

## 2018-09-11 RX ORDER — ONDANSETRON 8 MG/1
8 TABLET, ORALLY DISINTEGRATING ORAL EVERY 6 HOURS PRN
Status: DISCONTINUED | OUTPATIENT
Start: 2018-09-11 | End: 2018-09-11

## 2018-09-11 RX ORDER — DEXTROSE MONOHYDRATE AND SODIUM CHLORIDE 5; .45 G/100ML; G/100ML
INJECTION, SOLUTION INTRAVENOUS CONTINUOUS
Status: DISCONTINUED | OUTPATIENT
Start: 2018-09-11 | End: 2018-09-11

## 2018-09-11 RX ORDER — PRAZOSIN HYDROCHLORIDE 1 MG/1
5 CAPSULE ORAL 2 TIMES DAILY
Status: DISCONTINUED | OUTPATIENT
Start: 2018-09-11 | End: 2018-09-20 | Stop reason: HOSPADM

## 2018-09-11 RX ORDER — LANOLIN ALCOHOL/MO/W.PET/CERES
400 CREAM (GRAM) TOPICAL
Status: DISCONTINUED | OUTPATIENT
Start: 2018-09-11 | End: 2018-09-11

## 2018-09-11 RX ORDER — INSULIN ASPART 100 [IU]/ML
0-5 INJECTION, SOLUTION INTRAVENOUS; SUBCUTANEOUS
Status: DISCONTINUED | OUTPATIENT
Start: 2018-09-11 | End: 2018-09-20 | Stop reason: HOSPADM

## 2018-09-11 RX ORDER — ONDANSETRON 8 MG/1
8 TABLET, ORALLY DISINTEGRATING ORAL EVERY 6 HOURS
Status: DISCONTINUED | OUTPATIENT
Start: 2018-09-11 | End: 2018-09-11

## 2018-09-11 RX ORDER — ACETAMINOPHEN 325 MG/1
650 TABLET ORAL EVERY 4 HOURS PRN
Status: DISCONTINUED | OUTPATIENT
Start: 2018-09-11 | End: 2018-09-20 | Stop reason: HOSPADM

## 2018-09-11 RX ORDER — ENOXAPARIN SODIUM 100 MG/ML
40 INJECTION SUBCUTANEOUS EVERY 24 HOURS
Status: DISCONTINUED | OUTPATIENT
Start: 2018-09-11 | End: 2018-09-20 | Stop reason: HOSPADM

## 2018-09-11 RX ORDER — PREDNISONE 10 MG/1
10 TABLET ORAL 2 TIMES DAILY
Status: DISCONTINUED | OUTPATIENT
Start: 2018-09-11 | End: 2018-09-20 | Stop reason: HOSPADM

## 2018-09-11 RX ORDER — LOSARTAN POTASSIUM 50 MG/1
100 TABLET ORAL DAILY
Status: DISCONTINUED | OUTPATIENT
Start: 2018-09-11 | End: 2018-09-12

## 2018-09-11 RX ORDER — CLOPIDOGREL BISULFATE 75 MG/1
75 TABLET ORAL DAILY
Status: DISCONTINUED | OUTPATIENT
Start: 2018-09-11 | End: 2018-09-20 | Stop reason: HOSPADM

## 2018-09-11 RX ORDER — SIMVASTATIN 20 MG/1
40 TABLET, FILM COATED ORAL NIGHTLY
Status: DISCONTINUED | OUTPATIENT
Start: 2018-09-11 | End: 2018-09-20 | Stop reason: HOSPADM

## 2018-09-11 RX ORDER — FUROSEMIDE 40 MG/1
40 TABLET ORAL DAILY
Status: DISCONTINUED | OUTPATIENT
Start: 2018-09-11 | End: 2018-09-20 | Stop reason: HOSPADM

## 2018-09-11 RX ORDER — NOREPINEPHRINE BITARTRATE/D5W 16MG/250ML
0.04 PLASTIC BAG, INJECTION (ML) INTRAVENOUS CONTINUOUS
Status: DISCONTINUED | OUTPATIENT
Start: 2018-09-11 | End: 2018-09-13

## 2018-09-11 RX ORDER — LIDOCAINE HYDROCHLORIDE 10 MG/ML
INJECTION INFILTRATION; PERINEURAL
Status: DISCONTINUED
Start: 2018-09-11 | End: 2018-09-12 | Stop reason: WASHOUT

## 2018-09-11 RX ORDER — IBUPROFEN 200 MG
24 TABLET ORAL
Status: DISCONTINUED | OUTPATIENT
Start: 2018-09-11 | End: 2018-09-20 | Stop reason: HOSPADM

## 2018-09-11 RX ORDER — LIDOCAINE HYDROCHLORIDE 10 MG/ML
INJECTION, SOLUTION EPIDURAL; INFILTRATION; INTRACAUDAL; PERINEURAL
Status: COMPLETED | OUTPATIENT
Start: 2018-09-11 | End: 2018-09-11

## 2018-09-11 RX ORDER — DULOXETIN HYDROCHLORIDE 30 MG/1
60 CAPSULE, DELAYED RELEASE ORAL DAILY
Status: DISCONTINUED | OUTPATIENT
Start: 2018-09-11 | End: 2018-09-20 | Stop reason: HOSPADM

## 2018-09-11 RX ORDER — ACETAMINOPHEN 10 MG/ML
1000 INJECTION, SOLUTION INTRAVENOUS ONCE
Status: COMPLETED | OUTPATIENT
Start: 2018-09-11 | End: 2018-09-11

## 2018-09-11 RX ORDER — IBUPROFEN 200 MG
16 TABLET ORAL
Status: DISCONTINUED | OUTPATIENT
Start: 2018-09-11 | End: 2018-09-20 | Stop reason: HOSPADM

## 2018-09-11 RX ORDER — ONDANSETRON 2 MG/ML
4 INJECTION INTRAMUSCULAR; INTRAVENOUS EVERY 6 HOURS PRN
Status: DISCONTINUED | OUTPATIENT
Start: 2018-09-11 | End: 2018-09-20 | Stop reason: HOSPADM

## 2018-09-11 RX ORDER — DILTIAZEM HYDROCHLORIDE 180 MG/1
180 CAPSULE, COATED, EXTENDED RELEASE ORAL DAILY
Status: DISCONTINUED | OUTPATIENT
Start: 2018-09-11 | End: 2018-09-12

## 2018-09-11 RX ORDER — HYDROCODONE BITARTRATE AND ACETAMINOPHEN 5; 325 MG/1; MG/1
1 TABLET ORAL EVERY 8 HOURS PRN
Status: DISCONTINUED | OUTPATIENT
Start: 2018-09-11 | End: 2018-09-11

## 2018-09-11 RX ORDER — PANTOPRAZOLE SODIUM 40 MG/1
40 TABLET, DELAYED RELEASE ORAL DAILY
Status: DISCONTINUED | OUTPATIENT
Start: 2018-09-11 | End: 2018-09-20 | Stop reason: HOSPADM

## 2018-09-11 RX ORDER — SODIUM CHLORIDE 0.9 % (FLUSH) 0.9 %
5 SYRINGE (ML) INJECTION
Status: DISCONTINUED | OUTPATIENT
Start: 2018-09-11 | End: 2018-09-20 | Stop reason: HOSPADM

## 2018-09-11 RX ORDER — ALBUTEROL SULFATE 90 UG/1
2 AEROSOL, METERED RESPIRATORY (INHALATION) 2 TIMES DAILY
Status: DISCONTINUED | OUTPATIENT
Start: 2018-09-11 | End: 2018-09-16

## 2018-09-11 RX ORDER — VANCOMYCIN HCL IN 5 % DEXTROSE 1G/250ML
1000 PLASTIC BAG, INJECTION (ML) INTRAVENOUS
Status: DISCONTINUED | OUTPATIENT
Start: 2018-09-12 | End: 2018-09-13

## 2018-09-11 RX ORDER — CLINDAMYCIN HYDROCHLORIDE 150 MG/1
300 CAPSULE ORAL 2 TIMES DAILY
Status: DISCONTINUED | OUTPATIENT
Start: 2018-09-11 | End: 2018-09-11

## 2018-09-11 RX ORDER — MAGNESIUM SULFATE HEPTAHYDRATE 40 MG/ML
2 INJECTION, SOLUTION INTRAVENOUS ONCE
Status: COMPLETED | OUTPATIENT
Start: 2018-09-11 | End: 2018-09-11

## 2018-09-11 RX ORDER — METRONIDAZOLE 500 MG/1
500 TABLET ORAL EVERY 8 HOURS
Status: DISCONTINUED | OUTPATIENT
Start: 2018-09-11 | End: 2018-09-11

## 2018-09-11 RX ORDER — SODIUM CHLORIDE 9 MG/ML
INJECTION, SOLUTION INTRAVENOUS CONTINUOUS
Status: DISCONTINUED | OUTPATIENT
Start: 2018-09-11 | End: 2018-09-12

## 2018-09-11 RX ORDER — GLUCAGON 1 MG
1 KIT INJECTION
Status: DISCONTINUED | OUTPATIENT
Start: 2018-09-11 | End: 2018-09-20 | Stop reason: HOSPADM

## 2018-09-11 RX ORDER — ASPIRIN 81 MG/1
81 TABLET ORAL DAILY
Status: DISCONTINUED | OUTPATIENT
Start: 2018-09-11 | End: 2018-09-20 | Stop reason: HOSPADM

## 2018-09-11 RX ADMIN — DEXTROSE AND SODIUM CHLORIDE: 5; .45 INJECTION, SOLUTION INTRAVENOUS at 02:09

## 2018-09-11 RX ADMIN — PRAZOSIN HYDROCHLORIDE 5 MG: 1 CAPSULE ORAL at 10:09

## 2018-09-11 RX ADMIN — Medication 500 MG: at 01:09

## 2018-09-11 RX ADMIN — INSULIN ASPART 2 UNITS: 100 INJECTION, SOLUTION INTRAVENOUS; SUBCUTANEOUS at 08:09

## 2018-09-11 RX ADMIN — INSULIN ASPART 4 UNITS: 100 INJECTION, SOLUTION INTRAVENOUS; SUBCUTANEOUS at 04:09

## 2018-09-11 RX ADMIN — ACETAMINOPHEN 650 MG: 325 TABLET, FILM COATED ORAL at 07:09

## 2018-09-11 RX ADMIN — PRAZOSIN HYDROCHLORIDE 5 MG: 1 CAPSULE ORAL at 08:09

## 2018-09-11 RX ADMIN — Medication 500 MG: at 05:09

## 2018-09-11 RX ADMIN — CLOPIDOGREL BISULFATE 75 MG: 75 TABLET ORAL at 04:09

## 2018-09-11 RX ADMIN — ONDANSETRON 8 MG: 8 TABLET, ORALLY DISINTEGRATING ORAL at 08:09

## 2018-09-11 RX ADMIN — ALBUTEROL SULFATE 2 PUFF: 90 AEROSOL, METERED RESPIRATORY (INHALATION) at 07:09

## 2018-09-11 RX ADMIN — DULOXETINE 60 MG: 30 CAPSULE, DELAYED RELEASE ORAL at 08:09

## 2018-09-11 RX ADMIN — MAGNESIUM OXIDE TAB 400 MG (241.3 MG ELEMENTAL MG) 400 MG: 400 (241.3 MG) TAB at 01:09

## 2018-09-11 RX ADMIN — SIMVASTATIN 40 MG: 20 TABLET, FILM COATED ORAL at 10:09

## 2018-09-11 RX ADMIN — ZINC OXIDE: 200 OINTMENT TOPICAL at 05:09

## 2018-09-11 RX ADMIN — PREDNISONE 10 MG: 10 TABLET ORAL at 10:09

## 2018-09-11 RX ADMIN — PANTOPRAZOLE SODIUM 40 MG: 40 TABLET, DELAYED RELEASE ORAL at 08:09

## 2018-09-11 RX ADMIN — ALBUTEROL SULFATE 2 PUFF: 90 AEROSOL, METERED RESPIRATORY (INHALATION) at 08:09

## 2018-09-11 RX ADMIN — SODIUM BICARBONATE: 84 INJECTION, SOLUTION INTRAVENOUS at 05:09

## 2018-09-11 RX ADMIN — INSULIN DETEMIR 10 UNITS: 100 INJECTION, SOLUTION SUBCUTANEOUS at 08:09

## 2018-09-11 RX ADMIN — PIPERACILLIN AND TAZOBACTAM 4.5 G: 4; .5 INJECTION, POWDER, LYOPHILIZED, FOR SOLUTION INTRAVENOUS; PARENTERAL at 06:09

## 2018-09-11 RX ADMIN — LIDOCAINE HYDROCHLORIDE 20 MG: 10 INJECTION, SOLUTION EPIDURAL; INFILTRATION; INTRACAUDAL; PERINEURAL at 07:09

## 2018-09-11 RX ADMIN — ACETAMINOPHEN 1000 MG: 10 INJECTION, SOLUTION INTRAVENOUS at 01:09

## 2018-09-11 RX ADMIN — FUROSEMIDE 40 MG: 40 TABLET ORAL at 08:09

## 2018-09-11 RX ADMIN — IOHEXOL 100 ML: 350 INJECTION, SOLUTION INTRAVENOUS at 03:09

## 2018-09-11 RX ADMIN — ASPIRIN 81 MG: 81 TABLET, COATED ORAL at 09:09

## 2018-09-11 RX ADMIN — THEOPHYLLINE ANHYDROUS 200 MG: 100 CAPSULE, EXTENDED RELEASE ORAL at 04:09

## 2018-09-11 RX ADMIN — ENOXAPARIN SODIUM 40 MG: 100 INJECTION SUBCUTANEOUS at 04:09

## 2018-09-11 RX ADMIN — SODIUM CHLORIDE: 0.9 INJECTION, SOLUTION INTRAVENOUS at 04:09

## 2018-09-11 RX ADMIN — SODIUM CHLORIDE 1000 ML: 0.9 INJECTION, SOLUTION INTRAVENOUS at 01:09

## 2018-09-11 RX ADMIN — CLINDAMYCIN HYDROCHLORIDE 300 MG: 150 CAPSULE ORAL at 08:09

## 2018-09-11 RX ADMIN — MAGNESIUM SULFATE IN WATER 2 G: 40 INJECTION, SOLUTION INTRAVENOUS at 03:09

## 2018-09-11 RX ADMIN — LIDOCAINE HYDROCHLORIDE 20 MG: 10 INJECTION, SOLUTION EPIDURAL; INFILTRATION; INTRACAUDAL; PERINEURAL at 09:09

## 2018-09-11 RX ADMIN — Medication 500 MG: at 11:09

## 2018-09-11 RX ADMIN — SIMVASTATIN 40 MG: 20 TABLET, FILM COATED ORAL at 01:09

## 2018-09-11 RX ADMIN — DILTIAZEM HYDROCHLORIDE 180 MG: 180 CAPSULE, COATED, EXTENDED RELEASE ORAL at 08:09

## 2018-09-11 RX ADMIN — INSULIN DETEMIR 10 UNITS: 100 INJECTION, SOLUTION SUBCUTANEOUS at 10:09

## 2018-09-11 RX ADMIN — Medication 500 MG: at 07:09

## 2018-09-11 RX ADMIN — METRONIDAZOLE 500 MG: 500 TABLET ORAL at 05:09

## 2018-09-11 RX ADMIN — VANCOMYCIN HYDROCHLORIDE 1250 MG: 10 INJECTION, POWDER, LYOPHILIZED, FOR SOLUTION INTRAVENOUS at 05:09

## 2018-09-11 NOTE — PT/OT/SLP EVAL
Physical Therapy Evaluation    Patient Name:  Sheryl Martines   MRN:  [id number]    Recommendations:     Discharge Recommendations:  nursing facility, skilled   Discharge Equipment Recommendations: walker, rolling   Barriers to discharge: None    Assessment:     Sheryl Martines is a 63 y.o. female admitted with a medical diagnosis of Decreased oral intake.  She presents with the following impairments/functional limitations:  weakness, gait instability, decreased lower extremity function, decreased upper extremity function, impaired balance, edema, pain, impaired skin, decreased safety awareness, impaired functional mobilty, impaired self care skills, impaired endurance . Patient unable to tolerate sitting EOB reported sensation of passing out..    Rehab Prognosis:  fair; patient would benefit from acute skilled PT services to address these deficits and reach maximum level of function.      Recent Surgery: * No surgery found *      Plan:     During this hospitalization, patient to be seen 6 x/week to address the above listed problems via gait training, therapeutic activities, therapeutic exercises  · Plan of Care Expires:  10/11/18   Plan of Care Reviewed with: patient    Subjective     Communicated with primary nurse prior to session.  Patient found supine upon PT entry to room, agreeable to evaluation.      Chief Complaint: none voiced  Patient comments/goals: none voiced  Pain/Comfort:  · Pain Rating 1: 0/10  · Pain Rating Post-Intervention 1: 0/10    Patients cultural, spiritual, Jehovah's witness conflicts given the current situation:      Living Environment:  Lives with daughter EV   Prior to admission, patients level of function was needed assistance since last hospitalization.  Patient has the following equipment: bedside commode, rollator.  DME owned (not currently used): none.  Upon discharge, patient will have assistance from none.    Objective:     Patient found with: peripheral IV     General Precautions:  Standard, fall, contact   Orthopedic Precautions:N/A   Braces: N/A     Exams:  · RLE ROM: WFL  · RLE Strength: 3/5  · LLE ROM: WFL  · LLE Strength: 3/5    Functional Mobility:  · Bed Mobility:     · Supine to Sit: maximal assistance  · Sit to Supine: maximal assistance  · Balance: poor    AM-PAC 6 CLICK MOBILITY  Total Score:8       Therapeutic Activities and Exercises:   unable to tolerated EOB patient lethargic     Patient left HOB elevated with all lines intact, call button in reach and bed alarm on.    GOALS:   Multidisciplinary Problems     Physical Therapy Goals        Problem: Physical Therapy Goal    Goal Priority Disciplines Outcome Goal Variances Interventions   Physical Therapy Goal     PT, PT/OT Ongoing (interventions implemented as appropriate)     Description:  Goals to be met by: 10/11/2018     Patient will increase functional independence with mobility by performin. Supine to sit with Stand-by Assistance  2. Sit to stand transfer with Stand-by Assistance  3. Bed to chair transfer with Stand-by Assistance using Rolling Walker  4. Gait  x 12 feet with Stand-by Assistance using Rolling Walker.                       History:     Past Medical History:   Diagnosis Date    Asthma     Closed compression fracture of fourth lumbar vertebra     COPD (chronic obstructive pulmonary disease)     Coronary artery disease     Diabetes mellitus     Glaucoma     High cholesterol     Hypertension     Iritis     Pulmonary embolus     Stroke     rt sided weakness.       Past Surgical History:   Procedure Laterality Date    ABDOMINAL SURGERY      APPENDECTOMY N/A 2018    Procedure: APPENDECTOMY;  Surgeon: Bernadine Melendrez MD;  Location: [facility] OR;  Service: General;  Laterality: N/A;    APPENDECTOMY N/A 2018    Performed by Bernadine Melendrez MD at [facility] OR    APPENDECTOMY, LAPAROSCOPIC---CONVERTED TO OPEN APPENDECTOMY @0950 N/A 2018    Performed by Bernadine Melendrez MD at [facility] OR     BACK SURGERY      stimulator    CATARACT EXTRACTION      HYSTERECTOMY      LAPAROSCOPIC APPENDECTOMY N/A 8/26/2018    Procedure: APPENDECTOMY, LAPAROSCOPIC---CONVERTED TO OPEN APPENDECTOMY @0950;  Surgeon: Bernadine Melendrez MD;  Location: [facility];  Service: General;  Laterality: N/A;       Clinical Decision Making:     History  Co-morbidities and personal factors that may impact the plan of care Examination  Body Structures and Functions, activity limitations and participation restrictions that may impact the plan of care Clinical Presentation   Decision Making/ Complexity Score   Co-morbidities:   [] Time since onset of injury / illness / exacerbation  [] Status of current condition  []Patient's cognitive status and safety concerns    [] Multiple Medical Problems (see med hx)  Personal Factors:   [] Patient's age  [x] Prior Level of function   [x] Patient's home situation (environment and family support)  [] Patient's level of motivation  [] Expected progression of patient      HISTORY:(criteria)    [] 79723 - no personal factors/history    [x] 97294 - has 1-2 personal factor/comorbidity     [] 98429 - has >3 personal factor/comorbidity     Body Regions:  [] Objective examination findings  [] Head     []  Neck  [] Trunk   [] Upper Extremity  [x] Lower Extremity    Body Systems:  [] For communication ability, affect, cognition, language, and learning style: the assessment of the ability to make needs known, consciousness, orientation (person, place, and time), expected emotional /behavioral responses, and learning preferences (eg, learning barriers, education  needs)  [x] For the neuromuscular system: a general assessment of gross coordinated movement (eg, balance, gait, locomotion, transfers, and transitions) and motor function  (motor control and motor learning)  [x] For the musculoskeletal system: the assessment of gross symmetry, gross range of motion, gross strength, height, and weight  [] For the  integumentary system: the assessment of pliability(texture), presence of scar formation, skin color, and skin integrity  [x] For cardiovascular/pulmonary system: the assessment of heart rate, respiratory rate, blood pressure, and edema     Activity limitations:    [] Patient's cognitive status and saf ety concerns          [] Status of current condition      [] Weight bearing restriction  [] Cardiopulmunary Restriction    Participation Restrictions:   [] Goals and goal agreement with the patient     [] Rehab potential (prognosis) and probable outcome      Examination of Body System: (criteria)    [x] 84025 - addressing 1-2 elements    [] 26412 - addressing a total of 3 or more elements     [] 19498 -  Addressing a total of 4 or more elements         Clinical Presentation: (criteria)  Stable - 80490     On examination of body system using standardized tests and measures patient presents with 1-2 elements from any of the following: body structures and functions, activity limitations, and/or participation restrictions.  Leading to a clinical presentation that is considered stable and/or uncomplicated                              Clinical Decision Making  (Eval Complexity):  Low- 26646     Time Tracking:     PT Received On: 09/11/18  PT Start Time: 1108     PT Stop Time: 1122  PT Total Time (min): 14 min     Billable Minutes: Evaluation 14      Renato Ayoub, PT  09/11/2018

## 2018-09-11 NOTE — PLAN OF CARE
Problem: Patient Care Overview  Goal: Plan of Care Review  Outcome: Ongoing (interventions implemented as appropriate)  Pt on RA with documented sats. Will continue to monitor.

## 2018-09-11 NOTE — ED TRIAGE NOTES
Pt presents to ED today c/o abdominal pain. Pt is s/p appendectomy with Dr. Melendrez on 8/26/2018 pt reports drainage from incision and pain to site.

## 2018-09-11 NOTE — PLAN OF CARE
Dr Carmichael at bedside, notified BP 71/41 and MAP 51. Rechecked BP 80/47, MAP 59.   Dr Carmichael ordered Anesthesiology consult for central line placement.

## 2018-09-11 NOTE — H&P
[facility] Medicine  History & Physical    Patient Name: Sheryl Martines  MRN: [id number]  Admission Date: 9/10/2018  Attending Physician: Dr. Aleks Whitmore  Primary Care Provider: Primary Doctor No         Patient information was obtained from patient, relative(s), past medical records and ER records.     Subjective:     Principal Problem:Decreased oral intake    Chief Complaint:   Chief Complaint   Patient presents with    Abdominal Pain     appendectomy last week, c/o pain and drainage from site, + fever        HPI:   63 y.o. 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 Still having continous waterry diarrhea. Patient said she doesn't feel good. Feels nauseated but no emesis. She had popsicle am and nothing else. She is able to keep fluid down but has no appetite and felt nauseated everytime she ate. She is having pain at the incision site, reported increased drainage. Took temperature at home, was 100.4.   She has diarrhea all day since discharge.     Past Medical History:   Diagnosis Date    Asthma     Closed compression fracture of fourth lumbar vertebra     COPD (chronic obstructive pulmonary disease)     Coronary artery disease     Diabetes mellitus     Glaucoma     High cholesterol     Hypertension     Iritis     Pulmonary embolus     Stroke     rt sided weakness.       Past Surgical History:   Procedure Laterality Date    ABDOMINAL SURGERY      APPENDECTOMY N/A 8/26/2018    Procedure: APPENDECTOMY;  Surgeon: Bernadine Melendrez MD;  Location: [facility] OR;  Service: General;  Laterality: N/A;    APPENDECTOMY N/A 8/26/2018    Performed by Bernadine Melendrez MD at [facility] OR    APPENDECTOMY, LAPAROSCOPIC---CONVERTED TO OPEN APPENDECTOMY @0950 N/A 8/26/2018    Performed by Bernadine Melendrez MD at [facility] OR    BACK SURGERY      stimulator     CATARACT EXTRACTION      HYSTERECTOMY      LAPAROSCOPIC APPENDECTOMY N/A 8/26/2018    Procedure: APPENDECTOMY, LAPAROSCOPIC---CONVERTED TO OPEN APPENDECTOMY @0950;  Surgeon: Bernadine Melendrez MD;  Location: [facility];  Service: General;  Laterality: N/A;       Review of patient's allergies indicates:   Allergen Reactions    Ace inhibitors Swelling    Corticosteroids (glucocorticoids)     Hydralazine analogues     Tetracyclines Swelling    Travatan (with benzalkonium) [travoprost (benzalkonium)]        No current facility-administered medications on file prior to encounter.      Current Outpatient Medications on File Prior to Encounter   Medication Sig    albuterol (ACCUNEB) 0.63 mg/3 mL Nebu Take 0.83 mg by nebulization every 6 (six) hours as needed. Rescue     aspirin (ECOTRIN) 81 MG EC tablet Take 1 tablet (81 mg total) by mouth once daily.    baclofen (LIORESAL) 10 MG tablet Take 10 mg by mouth 3 (three) times daily.    clindamycin (CLEOCIN) 300 MG capsule Take 1 capsule (300 mg total) by mouth 2 (two) times daily. for 7 days    clopidogrel (PLAVIX) 75 mg tablet Take 75 mg by mouth once daily.    diclofenac sodium 1 % Gel Apply topically once daily. for 10 days as directed    diltiaZEM (CARDIZEM CD) 180 MG 24 hr capsule Take 180 mg by mouth once daily.    DULoxetine (CYMBALTA) 60 MG capsule Take 60 mg by mouth once daily.    fluticasone-salmeterol 500-50 mcg/dose (ADVAIR DISKUS) 500-50 mcg/dose DsDv diskus inhaler Inhale 1 puff into the lungs 2 (two) times daily. Controller    furosemide (LASIX) 40 MG tablet Take 40 mg by mouth once daily.     gabapentin (NEURONTIN) 300 MG capsule Take 300 mg by mouth 3 (three) times daily.    insulin detemir U-100 (LEVEMIR) 100 unit/mL injection Inject 20 Units into the skin 2 (two) times daily.     insulin lispro (HUMALOG PEN SUBQ) Inject into the skin.    lansoprazole (PREVACID) 30 MG capsule Take 30 mg by mouth once daily.    losartan potassium (COZAAR  ORAL) Take 100 mg by mouth.     metroNIDAZOLE (FLAGYL) 500 MG tablet Take 1 tablet (500 mg total) by mouth every 8 (eight) hours. for 9 days    ondansetron (ZOFRAN) 4 MG tablet Take 1 tablet (4 mg total) by mouth every 6 (six) hours as needed.    prazosin (MINIPRESS) 5 MG capsule Take 5 mg by mouth 2 (two) times daily.     predniSONE (DELTASONE) 10 MG tablet Take 10 mg by mouth 2 (two) times daily.     pyridoxine, vitamin B6, (VITAMIN B-6) 50 MG Tab Take 50 mg by mouth once daily.    simvastatin (ZOCOR) 40 MG tablet Take 40 mg by mouth every evening.    sitagliptin phosphate (JANUVIA ORAL) Take by mouth.    theophylline (THEODUR) 200 MG 12 hr tablet Take 200 mg by mouth once daily.     theophylline anhydrous (UNIPHYL ORAL) Take by mouth.    traMADol (ULTRAM-ER) 100 MG Tb24 Take 50 mg by mouth daily as needed.    vancomycin 25 mg/mL solution Take 20 mLs (500 mg total) by mouth every 6 (six) hours. for 9 days     Family History     None        Tobacco Use    Smoking status: Never Smoker   Substance and Sexual Activity    Alcohol use: No     Frequency: Never    Drug use: No    Sexual activity: No     Partners: Male     Review of Systems   Constitutional: Positive for activity change, appetite change, fatigue and fever. Negative for chills and diaphoresis.   HENT: Negative for congestion, hearing loss, sinus pressure, sinus pain and sneezing.    Eyes: Negative for visual disturbance.   Respiratory: Positive for wheezing. Negative for apnea, cough, chest tightness and shortness of breath.    Cardiovascular: Negative for chest pain, palpitations and leg swelling.   Gastrointestinal: Positive for abdominal pain, diarrhea, nausea and rectal pain. Negative for abdominal distention, anal bleeding, blood in stool, constipation and vomiting.   Endocrine: Negative for polyuria.   Genitourinary: Negative for difficulty urinating, dysuria, hematuria and urgency.   Musculoskeletal: Negative for arthralgias, back pain  and myalgias.   Skin: Positive for wound. Negative for color change and rash.   Neurological: Negative for dizziness, speech difficulty, weakness and headaches.     Objective:     Vital Signs (Most Recent):  Temp: 99.1 °F (37.3 °C) (09/10/18 2348)  Pulse: (!) 111 (09/10/18 2348)  Resp: (!) 24 (09/10/18 2348)  BP: 128/66 (09/10/18 2331)  SpO2: 95 % (09/10/18 2348) Vital Signs (24h Range):  Temp:  [98.7 °F (37.1 °C)-99.1 °F (37.3 °C)] 99.1 °F (37.3 °C)  Pulse:  [] 111  Resp:  [16-26] 24  SpO2:  [95 %-98 %] 95 %  BP: (108-132)/(66-77) 128/66     Weight: 82.1 kg (181 lb)  Body mass index is 34.2 kg/m².    Physical Exam   Constitutional: She is oriented to person, place, and time. She appears well-developed and well-nourished. No distress.   HENT:   Head: Normocephalic and atraumatic.   Nose: Nose normal.   Mouth/Throat: Oropharynx is clear and moist.   Eyes: Conjunctivae and EOM are normal. Right eye exhibits no discharge. Left eye exhibits no discharge. No scleral icterus.   Neck: Normal range of motion. Neck supple. No JVD present.   Cardiovascular: Regular rhythm, normal heart sounds and intact distal pulses. Exam reveals no gallop and no friction rub.   No murmur heard.  Pulmonary/Chest: Effort normal and breath sounds normal. No stridor. No respiratory distress. She has no wheezes. She has no rales. She exhibits no tenderness.   Abdominal: Soft. Bowel sounds are normal. She exhibits no distension and no mass. There is tenderness. There is no rebound and no guarding.   Tenderness mostly at right lower quadrant where she has the incision.   No drainage, no erythema, packing intact, dry.     Musculoskeletal: Normal range of motion. She exhibits no edema, tenderness or deformity.   Neurological: She is alert and oriented to person, place, and time.   Skin: Skin is warm. Capillary refill takes less than 2 seconds. No rash noted. She is not diaphoretic. No erythema. No pallor.   Psychiatric:   Affect and mood not  confluent     Nursing note and vitals reviewed.        CRANIAL NERVES     CN III, IV, VI   Extraocular motions are normal.       Recent Labs      09/10/18   1918   WBC  11.34   HGB  9.6*   HCT  32.5*   PLT  464*   MCV  82   RDW  18.6*       Recent Labs      09/10/18   1918   NA  134*   K  3.5   CL  94*   CO2  31*   GLU  170*   BUN  7*   CREATININE  0.9   CALCIUM  9.1   PROT  5.9*   ALBUMIN  2.3*   BILITOT  0.5   ALKPHOS  97   AST  12   ALT  9*   ANIONGAP  9   ESTGFRAFRICA  >60   EGFRNONAA  >60       No results for input(s): MG, PHOS in the last 72 hours.    Coags  Lab Results   Component Value Date    INR 0.9 08/23/2018    APTT 29.0 08/23/2018     No results for input(s): PT, INR, APTT in the last 168 hours.    A1c:   Lab Results   Component Value Date    HGBA1C 8.1 (H) 08/23/2018   , Last Gluc:   Recent Labs   Lab  09/04/18   0635  09/04/18   1130  09/04/18   1911  09/05/18   0638  09/05/18   1310  09/10/18   2053   POCTGLUCOSE  317*  252*  309*  321*  288*  161*       TSH:   Lab Results   Component Value Date    TSH 0.902 08/22/2018         Cardiac Enzymes  No results for input(s): TROPONINI, CPKMB, CPK in the last 72 hours.      Urinalysis  Urinalysis  Recent Labs   Lab  09/10/18   1949   COLORU  Orange*   SPECGRAV  >=1.030*   PHUR  6.0   PROTEINUA  2+*   BACTERIA  Few*   NITRITE  Positive*   LEUKOCYTESUR  Trace*   UROBILINOGEN  Negative   HYALINECASTS  0     Recent Labs   Lab  09/10/18   1949   COLORU  Orange*   SPECGRAV  >=1.030*   PHUR  6.0   PROTEINUA  2+*   BACTERIA  Few*       Micro  Microbiology Results (last 7 days)     Procedure Component Value Units Date/Time    Aerobic culture (Specify Source) **CANNOT BE ORDERED AS STAT** [[id number]] Collected:  09/10/18 1817    Order Status:  Sent Specimen:  Wound from Abdomen Updated:  09/10/18 1931             ABG  No results for input(s): PH, PCO2, PO2, HCO3, POCSATURATED, BE in the last 168 hours.      Imaging   Imaging Results          CT Abdomen Pelvis With  Contrast (Final result)  Result time 09/10/18 21:00:09    Final result by Randal Dias MD (09/10/18 21:00:09)                 Impression:      Postoperative changes of recent appendectomy with increased inflammatory changes in the right lower abdominal quadrant.    Interval dehiscence of the right lateral abdominal wall with ill-defined fluid from the right lower quadrant appear to tract into the right abdominal wall defect.  Clinical correlation and surgical consultation is recommended.    Stable appearance of a 7 cm ill-defined soft tissue collection in the right hemipelvis.  The findings most likely represent a resolving hematoma.    Additional findings as above.    Case discussed with Dr. Duong on 09/10/18 at 8:59 p.m.      Electronically signed by: Randal Dias MD  Date:    09/10/2018  Time:    21:00             Narrative:    EXAMINATION:  CT ABDOMEN PELVIS WITH CONTRAST    CLINICAL HISTORY:  abdominal pain, s/p appendectomy;    TECHNIQUE:  Low dose axial images, sagittal and coronal reformations were obtained from the lung bases to the pubic symphysis following the IV administration of 75 mL of Omnipaque 350 .  Oral contrast was not given. Delayed images were obtained.    COMPARISON:  CT scan abdomen and pelvis dated 08/31/2018.    FINDINGS:  There are no pleural effusions.  There is no evidence of a pneumothorax.  No airspace opacity is present.  No discrete pulmonary nodule is identified.    The heart is unremarkable.  There is normal tapering of the abdominal aorta.  There are calcifications along the course of the abdominal aorta and branch vessels.  There is a probable circumaortic left renal vein.  The portal vein and mesenteric vessels are patent.  The IVC and the remainder of the venous structures are within normal limits.  There is no evidence of lymphadenopathy in the abdomen or pelvis.    The esophagus is within normal limits.  The stomach is unremarkable.  The duodenum is within normal limits.   There has been resolution of the previous small bowel obstruction.  Minimal distention is identified involving the small bowel/ileus.  There are postoperative changes of prior appendectomy.  There are persistent inflammatory changes within the right lower abdominal quadrant.  There is colonic diverticula without evidence of acute diverticulitis.    The liver is unremarkable.  The gallbladder is within normal limits.  The biliary tree is unremarkable.  The spleen is unremarkable.  The pancreas is within normal limits.    The adrenal glands are unremarkable.  There is stable appearance of a subcentimeter hypodensities in the kidneys that are too small for complete characterization.  There is a nonobstructing stone in the upper pole of the right kidney.  There is persistent distention of the right ureter.  The inflammatory changes surrounding the right ureter.  This is incompletely evaluated secondary to streak artifact from hip hardware..  The urinary bladder appears decompressed.    There are persistent inflammatory changes in the right lower abdominal quadrant.  There is a stable appearance of a 7.1 x 4.4 cm high attenuation collection within the right pelvic sidewall.  The overall inflammatory changes in the right lower abdominal quadrant has progressed compared to the prior examination.  No definitive evidence of free air is present.  There has been interval dehiscence of the right anterior abdominal wall.  There is increased attenuation involving the right lateral rectus abdominus musculature.  There is infiltration involving the right rectus abdominus muscle with multiple areas of low attenuation.  There is ill-defined fluid tracking from the right lower quadrant into the right lateral abdominal wall defect.  There is an umbilical hernia containing omental fat.    There is an intrathecal lead in place.  There are postoperative changes of left hip arthroplasty.  There are degenerative changes in the osseous  structures.  There is stable appearance of multiple compression fractures involving the lumbar spine.                                  Assessment/Plan:     *Debilitated patient due to Decreased oral intake  Feels nauseated, couldn't tolerate solid food since discharge  Was slightly hypotensive and dehydrated on exam.   S/p 1L LR and will continue gentle fluid per HFrEF w/ DD  Will  theophylline level, and f/u am labs.    S/P appendectomy  Stable on repeated CT abd.   Will consulted Dr. Melendrez in morning who has been notified via ED  Incentive spirometry ordered.  PT/OT ordered    C diff infection  Per ID and Gen surg., patient was dc'd with po vanc/flaygl/clinda  Patient reported compliant with the medication, but missed taking pm dose, will order  c diff precaution.    Moderate asthma w/o complication  Not in acute exacerbation  Will order albuterol neb q6h     DM2  Holing basal as patient has dec po intake  On mild SSI      CAD  On aspirin, will resume plavix tomorrow.  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 There was some lacunar infarct from the ct head in the last admission  Patient reports no focal neurolgoical complaints and is neurovascular intact.      GI prophylaxis: protonix  VTE prophylaxis: lovenox  PT/OT ordered for tmr  IS ordered now    Dispo: pending Dr. Melendrez am, NPO midnight. Will resume home BP/DM2 meds tmr. Theophylline pending.     Active Diagnoses:    Diagnosis Date Noted POA    PRINCIPAL PROBLEM:  Decreased oral intake [R63.8] 09/11/2018 Unknown    S/P appendectomy [Z90.49] 09/11/2018 Not Applicable    Clostridium difficile infection [B96.89] 08/30/2018 Yes    Moderate asthma without complication [J45.909] 09/11/2018 Unknown    HFrEF (heart failure with reduced ejection fraction) [I50.20] 09/11/2018 Unknown    DM (diabetes mellitus) [E11.9] 08/23/2018 Yes    CAD (coronary  artery disease) [I25.10] 08/23/2018 Yes    HTN (hypertension) [I10] 08/23/2018 Yes    CVA, old, hemiparesis [I69.359] 08/23/2018 Not Applicable      Problems Resolved During this Admission:         Dexter Fernández MD  [facility] HO2

## 2018-09-11 NOTE — PROGRESS NOTES
"Vancomycin Dosing and Monitoring Pharmacy Protocol    Sheryl Martines is a 63 y.o. female    Height: 5' 1" (1.549 m)   Wt Readings from Last 3 Encounters:   09/11/18 82.1 kg (181 lb)   09/05/18 88.9 kg (195 lb 15.8 oz)   08/14/18 81.6 kg (180 lb)     Ideal body weight: 47.8 kg (105 lb 6.1 oz)  Adjusted ideal body weight: 61.5 kg (135 lb 10 oz)    Temp Readings from Last 3 Encounters:   09/11/18 98.9 °F (37.2 °C)   09/05/18 96.3 °F (35.7 °C)   08/14/18 97.1 °F (36.2 °C) (Oral)      Lab Results   Component Value Date/Time    WBC 8.12 09/11/2018 02:22 PM    WBC 11.11 09/11/2018 06:01 AM    WBC 11.34 09/10/2018 07:18 PM      Lab Results   Component Value Date/Time    CREATININE 0.9 09/11/2018 02:22 PM    CREATININE 0.8 09/11/2018 06:01 AM    CREATININE 0.9 09/10/2018 07:18 PM      Lab Results   Component Value Date/Time    LACTATE 1.5 09/11/2018 02:22 PM    LACTATE 2.3 (H) 09/11/2018 01:34 AM    LACTATE 1.1 08/27/2018 07:45 AM       Serum creatinine: 0.9 mg/dL 09/11/18 1422  Estimated creatinine clearance: 62.1 mL/min    Antibiotics (From admission, onward)      Start     Stop Route Frequency Ordered    09/12/18 0530  vancomycin in dextrose 5 % 1 gram/250 mL IVPB 1,000 mg  (Vancomycin IVPB with levels panel)      -- IV Every 12 hours (non-standard times) 09/11/18 1607    09/11/18 1730  vancomycin (VANCOCIN) 1,250 mg in dextrose 5 % 500 mL IVPB      -- IV Once 09/11/18 1629    09/11/18 1715  piperacillin-tazobactam 4.5 g in dextrose 5 % 100 mL IVPB (ready to mix system)      -- IV Every 8 hours (non-standard times) 09/11/18 1607    09/11/18 0130  vancomycin 250mg / 10ml oral suspension 500 mg      -- Oral Every 6 hours 09/11/18 0125          Antifungals (From admission, onward)      None            Microbiology Results (last 7 days)       Procedure Component Value Units Date/Time    Aerobic culture (Specify Source) **CANNOT BE ORDERED AS STAT** [[id number]] Collected:  09/10/18 1817    Order Status:  Completed Specimen:  " Wound from Abdomen Updated:  18 1254     Aerobic Bacterial Culture --     STAPHYLOCOCCUS AUREUS  Moderate  Susceptibility pending      Blood culture [[id number]] Collected:  18 1249    Order Status:  Sent Specimen:  Blood Updated:  18 1249    Blood culture [[id number]] Collected:  18 1248    Order Status:  Sent Specimen:  Blood Updated:  18 124    Urine culture [[id number]]     Order Status:  No result Specimen:  Urine             Indication/Target trough:   Sepsis (Target trough: 15-20mcg/ml)    Hemodialysis:   N/A    Dosing Weight:   AdjBW--adjusted body weight  If ABW is greater than or equal to 30% over Ideal Body Weight, AdjBW will be used to calculate vancomycin dose.    Last Vancomycin dose: N/A   Date/Time given: N/A         Vancomycin level:  No results for input(s): VANCOMYCIN-TROUGH in the last 72 hours.  Recent Labs   Lab Result Units  18   0601   Vancomycin, Random ug/mL  <1.1       Per Protocol Initial/Adjustments Dosin. Initial/Adjustment Dose: Loading dose = 1250 mg x1, followed by Maintenance dose of 1000 mg q12hr to be given at 18 0530 date/time  2. Vancomycin Trough Level will be drawn on 18 0445 date/time    Pharmacy will continue to follow.    Please contact if you have any further questions. Thank you.    Sharon Irvin, PharmD  [phone]

## 2018-09-11 NOTE — PLAN OF CARE
Problem: Infection, Risk/Actual (Adult)  Goal: Identify Related Risk Factors and Signs and Symptoms  Related risk factors and signs and symptoms are identified upon initiation of Human Response Clinical Practice Guideline (CPG)  Outcome: Ongoing (interventions implemented as appropriate)  Patient aaox4, safety measures in place, bed in lowest position, side rails up x2..no complaints of pain or distress.

## 2018-09-11 NOTE — CONSULTS
Today`s Date: 9/11/2018     Admit Date: 9/10/2018    Admitting Physician: Aleks Whitmore MD    Patient`s Name: Sheryl Martines , 63 y.o. female    Reason for consultation  Patient known s/p appendectomy,    Patient Active Problem List:     DM (diabetes mellitus)     HTN (hypertension)     CAD (coronary artery disease)     Closed compression fracture of fourth lumbar vertebra     Opiate use     CVA, old, hemiparesis     CVA (cerebral vascular accident)     Clostridium difficile infection     S/P appendectomy     Decreased oral intake     Moderate asthma without complication     HFrEF (heart failure with reduced ejection fraction)      Past Medical History:  No date: Asthma  No date: Closed compression fracture of fourth lumbar vertebra  No date: COPD (chronic obstructive pulmonary disease)  No date: Coronary artery disease  No date: Diabetes mellitus  No date: Glaucoma  No date: High cholesterol  No date: Hypertension  No date: Iritis  No date: Pulmonary embolus  No date: Stroke      Comment:  rt sided weakness.    Past Surgical History:  No date: ABDOMINAL SURGERY  8/26/2018: APPENDECTOMY; N/A      Comment:  Procedure: APPENDECTOMY;  Surgeon: Bernadine Melendrez MD;  Location: [facility] OR;  Service: General;  Laterality:                N/A;  8/26/2018: APPENDECTOMY; N/A      Comment:  Performed by Bernadine Melendrez MD at [facility] OR  8/26/2018: APPENDECTOMY, LAPAROSCOPIC---CONVERTED TO OPEN   APPENDECTOMY @0950; N/A      Comment:  Performed by Bernadine Melendrez MD at [facility] OR  No date: BACK SURGERY      Comment:  stimulator  No date: CATARACT EXTRACTION  No date: HYSTERECTOMY  8/26/2018: LAPAROSCOPIC APPENDECTOMY; N/A      Comment:  Procedure: APPENDECTOMY, LAPAROSCOPIC---CONVERTED TO                OPEN APPENDECTOMY @0950;  Surgeon: Bernadine Melendrez MD;  Location: [facility] OR;  Service: General;  Laterality:                N/A;    Prior to Admission medications :  Medication aspirin  (ECOTRIN) 81 MG EC tablet, Sig Take 1 tablet (81 mg total) by mouth once daily., Start Date 9/5/18, End Date 9/5/19, Taking? Yes, Authorizing Provider Julian Carmichael MD    Medication clopidogrel (PLAVIX) 75 mg tablet, Sig Take 75 mg by mouth once daily., Start Date , End Date , Taking? Yes, Authorizing Provider Historical Provider, MD    Medication diltiaZEM (CARDIZEM CD) 180 MG 24 hr capsule, Sig Take 180 mg by mouth once daily., Start Date , End Date , Taking? Yes, Authorizing Provider Historical Provider, MD    Medication DULoxetine (CYMBALTA) 60 MG capsule, Sig Take 60 mg by mouth once daily., Start Date , End Date , Taking? Yes, Authorizing Provider Historical Provider, MD    Medication fluticasone-salmeterol 500-50 mcg/dose (ADVAIR DISKUS) 500-50 mcg/dose DsDv diskus inhaler, Sig Inhale 1 puff into the lungs 2 (two) times daily. Controller, Start Date , End Date , Taking? Yes, Authorizing Provider Historical Provider, MD    Medication furosemide (LASIX) 40 MG tablet, Sig Take 40 mg by mouth once daily. , Start Date , End Date , Taking? Yes, Authorizing Provider Historical Provider, MD    Medication gabapentin (NEURONTIN) 300 MG capsule, Sig Take 300 mg by mouth 3 (three) times daily., Start Date , End Date , Taking? Yes, Authorizing Provider Historical Provider, MD    Medication lansoprazole (PREVACID) 30 MG capsule, Sig Take 30 mg by mouth once daily., Start Date , End Date , Taking? Yes, Authorizing Provider Historical Provider, MD    Medication losartan potassium (COZAAR ORAL), Sig Take 100 mg by mouth. , Start Date , End Date , Taking? Yes, Authorizing Provider Historical Provider, MD    Medication metroNIDAZOLE (FLAGYL) 500 MG tablet, Sig Take 1 tablet (500 mg total) by mouth every 8 (eight) hours. for 9 days, Start Date 9/4/18, End Date 9/15/18, Taking? Yes, Authorizing Provider Julian Carmichael MD    Medication ondansetron (ZOFRAN) 4 MG tablet, Sig Take 1 tablet (4 mg total) by mouth every 6  (six) hours as needed., Start Date 9/4/18, End Date , Taking? Yes, Authorizing Provider Julian Carmichael MD    Medication prazosin (MINIPRESS) 5 MG capsule, Sig Take 5 mg by mouth 2 (two) times daily. , Start Date , End Date , Taking? Yes, Authorizing Provider Historical MD Wanda    Medication predniSONE (DELTASONE) 10 MG tablet, Sig Take 10 mg by mouth 2 (two) times daily. , Start Date , End Date , Taking? Yes, Authorizing Provider Historical Provider, MD    Medication pyridoxine, vitamin B6, (VITAMIN B-6) 50 MG Tab, Sig Take 50 mg by mouth once daily., Start Date , End Date , Taking? Yes, Authorizing Provider Historical Provider, MD    Medication simvastatin (ZOCOR) 40 MG tablet, Sig Take 40 mg by mouth every evening., Start Date , End Date , Taking? Yes, Authorizing Provider Historical Provider, MD    Medication sitagliptin phosphate (JANUVIA ORAL), Sig Take by mouth., Start Date , End Date , Taking? Yes, Authorizing Provider Historical Provider, MD    Medication theophylline (THEODUR) 200 MG 12 hr tablet, Sig Take 200 mg by mouth once daily. , Start Date , End Date , Taking? Yes, Authorizing Provider Historical Provider, MD    Medication theophylline anhydrous (UNIPHYL ORAL), Sig Take by mouth., Start Date , End Date , Taking? Yes, Authorizing Provider Historical MD Wanda    Medication albuterol (ACCUNEB) 0.63 mg/3 mL Nebu, Sig Take 0.83 mg by nebulization every 6 (six) hours as needed. Rescue , Start Date , End Date , Taking? , Authorizing Provider Historical Provider, MD    Medication baclofen (LIORESAL) 10 MG tablet, Sig Take 10 mg by mouth 3 (three) times daily., Start Date , End Date , Taking? , Authorizing Provider Historical Provider, MD    Medication clindamycin (CLEOCIN) 300 MG capsule, Sig Take 1 capsule (300 mg total) by mouth 2 (two) times daily. for 7 days, Start Date 9/5/18, End Date 9/12/18, Taking? , Authorizing Provider Julian Carmichael MD    Medication diclofenac sodium 1 % Gel,  Sig Apply topically once daily. for 10 days as directed, Start Date 9/5/18, End Date 9/15/18, Taking? , Authorizing Provider Julian Carmichael MD    Medication insulin detemir U-100 (LEVEMIR) 100 unit/mL injection, Sig Inject 20 Units into the skin 2 (two) times daily. , Start Date , End Date , Taking? , Authorizing Provider Historical Provider, MD    Medication insulin lispro (HUMALOG PEN SUBQ), Sig Inject into the skin., Start Date , End Date , Taking? , Authorizing Provider Historical Provider, MD    Medication traMADol (ULTRAM-ER) 100 MG Tb24, Sig Take 50 mg by mouth daily as needed., Start Date , End Date , Taking? , Authorizing Provider Historical Provider, MD    Medication vancomycin 25 mg/mL solution, Sig Take 20 mLs (500 mg total) by mouth every 6 (six) hours. for 9 days, Start Date 9/4/18, End Date 9/13/18, Taking? , Authorizing Provider Julian Carmichael MD      No current facility-administered medications on file prior to encounter.   Current Outpatient Medications on File Prior to Encounter:  aspirin (ECOTRIN) 81 MG EC tablet, Take 1 tablet (81 mg total) by mouth once daily., Disp: 30 tablet, Rfl: 11  clopidogrel (PLAVIX) 75 mg tablet, Take 75 mg by mouth once daily., Disp: , Rfl:   diltiaZEM (CARDIZEM CD) 180 MG 24 hr capsule, Take 180 mg by mouth once daily., Disp: , Rfl:   DULoxetine (CYMBALTA) 60 MG capsule, Take 60 mg by mouth once daily., Disp: , Rfl:   fluticasone-salmeterol 500-50 mcg/dose (ADVAIR DISKUS) 500-50 mcg/dose DsDv diskus inhaler, Inhale 1 puff into the lungs 2 (two) times daily. Controller, Disp: , Rfl:   furosemide (LASIX) 40 MG tablet, Take 40 mg by mouth once daily. , Disp: , Rfl:   gabapentin (NEURONTIN) 300 MG capsule, Take 300 mg by mouth 3 (three) times daily., Disp: , Rfl:   lansoprazole (PREVACID) 30 MG capsule, Take 30 mg by mouth once daily., Disp: , Rfl:   losartan potassium (COZAAR ORAL), Take 100 mg by mouth. , Disp: , Rfl:   metroNIDAZOLE (FLAGYL) 500 MG tablet,  Take 1 tablet (500 mg total) by mouth every 8 (eight) hours. for 9 days, Disp: 28 tablet, Rfl: 0  ondansetron (ZOFRAN) 4 MG tablet, Take 1 tablet (4 mg total) by mouth every 6 (six) hours as needed., Disp: 30 tablet, Rfl: 1  prazosin (MINIPRESS) 5 MG capsule, Take 5 mg by mouth 2 (two) times daily. , Disp: , Rfl:   predniSONE (DELTASONE) 10 MG tablet, Take 10 mg by mouth 2 (two) times daily. , Disp: , Rfl:   pyridoxine, vitamin B6, (VITAMIN B-6) 50 MG Tab, Take 50 mg by mouth once daily., Disp: , Rfl:   simvastatin (ZOCOR) 40 MG tablet, Take 40 mg by mouth every evening., Disp: , Rfl:   sitagliptin phosphate (JANUVIA ORAL), Take by mouth., Disp: , Rfl:   theophylline (THEODUR) 200 MG 12 hr tablet, Take 200 mg by mouth once daily. , Disp: , Rfl:   theophylline anhydrous (UNIPHYL ORAL), Take by mouth., Disp: , Rfl:   albuterol (ACCUNEB) 0.63 mg/3 mL Nebu, Take 0.83 mg by nebulization every 6 (six) hours as needed. Rescue , Disp: , Rfl:   baclofen (LIORESAL) 10 MG tablet, Take 10 mg by mouth 3 (three) times daily., Disp: , Rfl:   clindamycin (CLEOCIN) 300 MG capsule, Take 1 capsule (300 mg total) by mouth 2 (two) times daily. for 7 days, Disp: 14 capsule, Rfl: 0  diclofenac sodium 1 % Gel, Apply topically once daily. for 10 days as directed, Disp: 100 g, Rfl: 2  insulin detemir U-100 (LEVEMIR) 100 unit/mL injection, Inject 20 Units into the skin 2 (two) times daily. , Disp: , Rfl:   insulin lispro (HUMALOG PEN SUBQ), Inject into the skin., Disp: , Rfl:   traMADol (ULTRAM-ER) 100 MG Tb24, Take 50 mg by mouth daily as needed., Disp: , Rfl:   vancomycin 25 mg/mL solution, Take 20 mLs (500 mg total) by mouth every 6 (six) hours. for 9 days, Disp: 720 mL, Rfl: 0         Review of patient's allergies indicates:   -- Ace inhibitors -- Swelling   -- Corticosteroids (glucocorticoids)    -- Hydralazine analogues    -- Tetracyclines -- Swelling   -- Travatan (with benzalkonium) [travoprost (benzalkonium)]     Social History:    reports that  has never smoked. She does not have any smokeless tobacco history on file. She reports that she does not drink alcohol or use drugs.     Review of patient's family history indicates:  Problem: Cancer      Relation: Father          Age of Onset: (Not Specified)  Problem: Diabetes      Relation: Brother          Age of Onset: (Not Specified)      PHYSICAL EXAMINATION  Temp:  [98.7 °F (37.1 °C)-100.3 °F (37.9 °C)] 98.9 °F (37.2 °C)  Pulse:  [] 90  Resp:  [16-26] 18  SpO2:  [92 %-98 %] 98 %  BP: ()/(47-91) 92/47    General Condition:   Patient known to me from previous hospitalization,     Head & Neck  Anemia: None  Jaundice: None  Neck vein: Not distended  Carotid Bruits: none  Lymph nodes: none palpable  Thyroid: normal    Chest: normal    Heart: normal    Rt. Breast: not examined  Lt. Breast: not examined  Axillary lymph nodes: none    Abdomen: Soft,  Mild  tender with no palpable mass or organ, wound mild drainage , healing better,     Hernia: none    Rectal: Defered    Extremities: normal    Vascular: normal    Specific focus Examination    Imp: Dehydration, resolving pelvic hematoma, possible fascial dehiscence , obesity, htn    Plan: d/c po flagyl, , all skin staples out , leave open to air , will observe for now to continue hydration and pt /ot

## 2018-09-11 NOTE — PLAN OF CARE
TN briefly met with patient's daughter, Kandice. Patient currently working with PT.     Patient readmitted, declined placement and PCP set up upon last discharge.     Patient current with [facility] for SN woundcare, PT/OT.  Patient Has DME in place. TN to follow up with patient and family regarding placement after PT evaluation.      Update: Patient has been transferred to to ICU for closer monitoring of resp status. Family not available at this time.      Future Appointments   Date Time Provider Department Center   9/13/2018  2:20 PM Bernadine Melendrez MD [facility]        09/11/18 1534   Discharge Assessment   Assessment Type Discharge Planning Assessment   Confirmed/corrected address and phone number on facesheet? Yes   Assessment information obtained from? Medical Record   Communicated expected length of stay with patient/caregiver yes   Prior to hospitilization cognitive status: Alert/Oriented   Prior to hospitalization functional status: Assistive Equipment   Current cognitive status: Alert/Oriented   Current Functional Status: Assistive Equipment   Lives With child(nandini), dependent  (Kandice- )   Able to Return to Prior Arrangements yes   Is patient able to care for self after discharge? Yes   Who are your caregiver(s) and their phone number(s)? Kandice- [phone]   Patient's perception of discharge disposition home or selfcare   Readmission Within The Last 30 Days no previous admission in last 30 days   Patient currently being followed by outpatient case management? Unable to determine (comments)   Patient currently receives any other outside agency services? No   Is it the patient/care giver preference to resume care with the current outside agency? Yes   Equipment Currently Used at Home commode;rollator   Do you have any problems affording any of your prescribed medications? No   Is the patient taking medications as prescribed? yes   Does the patient have transportation home? Yes   Transportation Available  family or friend will provide   Does the patient receive services at the [facility]? No   Discharge Plan A Skilled Nursing Facility   Discharge Plan B Home with family;Home Health   Patient/Family In Agreement With Plan unable to assess

## 2018-09-11 NOTE — PLAN OF CARE
Problem: Occupational Therapy Goal  Goal: Occupational Therapy Goal  Goals to be met by: 10/11/18     Patient will increase functional independence with ADLs by performing:    LE Dressing with Minimal Assistance.  Grooming while seated with Stand-by Assistance.  Toileting from bedside commode with Minimal Assistance for hygiene and clothing management.   Supine to sit with Stand-by Assistance.  Stand pivot transfers with Contact Guard Assistance.  Toilet transfer to bedside commode with Contact Guard Assistance.  Increased functional strength to WFL for self care skills and functional mobility.  Upper extremity exercise program x10 reps per handout, with independence.    Outcome: Ongoing (interventions implemented as appropriate)  Pt would benefit from cont OT services in order to maximize functional independence. Recommending SNF at d/c

## 2018-09-11 NOTE — PLAN OF CARE
Anesthesia came to bedside and obtained phone consent for central line placement with daughter. MD would like nurse to access port a cath in replacement of central line for now.     ICU RN attempted to access port without success.   ED RN called to access port, will come to bedside when available.   If no success will call anesthesia to place central line so levophed gtt can be started.

## 2018-09-11 NOTE — ANESTHESIA PROCEDURE NOTES
Peripheral IV Insertion    Diagnosis: I99.8 Other disorder of circulatory system    Procedure start time: 9/11/2018 5:51 PM  Timeout: 9/11/2018 5:50 PM  Procedure end time: 9/11/2018 5:56 PM  Staffing  Anesthesiologist: Mildred Barboza MD  Resident/CRNA: Kelli Will MD  Performed: resident/CRNA   Anesthesiologist was present at the time of the procedure.  Preanesthetic Checklist  Completed: patient identified, site marked, surgical consent, pre-op evaluation, timeout performed, IV checked, risks and benefits discussed, monitors and equipment checked and anesthesia consent givenPeripheral IV Insertion  Skin Prep: chlorhexidine gluconate  Local Infiltration: none  Orientation: left  Location: upper arm  Catheter Size: 20 G  Catheter placement by Ultrasound guidance. Heme positive aspiration all ports.  Vessel Caliber: small, medium, patent, compressibility normal  Needle advanced into vessel with real time Ultrasound guidance.Insertion Attempts: 1  Assessment  Dressing: secured with tape and tegaderm, secured with tape and tegaderm  Patient: Tolerated well  Line flushed easily.

## 2018-09-11 NOTE — PROGRESS NOTES
Progress Note  [facility]  Admit Date: 9/10/2018   LOS: 0 days   SUBJECTIVE:   Follow-up For: decreased PO intake    NAEON.  Temp 100.3 this AM.  Still feeling nauseated.  Not interested in food.  Some abdominal pain, but diffuse.  Diarrhea has slowed.  No CP, no SOB.    ROS   See above    OBJECTIVE:   Vital Signs (Most Recent)  Temp: 98.9 °F (37.2 °C) (09/11/18 1147)  Pulse: 98 (09/11/18 1352)  Resp: (!) 22 (09/11/18 1316)  BP: (!) 84/55 (09/11/18 1352)  SpO2: 97 % (09/11/18 1316)    I & O (Last 24H):    Intake/Output Summary (Last 24 hours) at 9/11/2018 1417  Last data filed at 9/11/2018 0337  Gross per 24 hour   Intake --   Output 226 ml   Net -226 ml     Wt Readings from Last 3 Encounters:   09/11/18 82.1 kg (181 lb)   09/05/18 88.9 kg (195 lb 15.8 oz)   08/14/18 81.6 kg (180 lb)       Current Diet Order   Procedures    Diet NPO Except for: Medication     Order Specific Question:   Except for     Answer:   Medication        Physical Exam   Constitutional: She is oriented to person, place, and time. No distress.   Deconditioned, fatigued   HENT:   Head: Normocephalic.   Eyes: Conjunctivae are normal.   Neck: Normal range of motion.   Cardiovascular: Normal rate, regular rhythm and normal heart sounds.   No murmur heard.  Pulmonary/Chest: Breath sounds normal. No respiratory distress. She has no wheezes. She has no rales.   Port in place for IV sticks, decreased breath sounds on right   Abdominal: Soft. Bowel sounds are normal. There is tenderness. There is no rebound.   Staples in place, draining serosang discharge, TTP, no erythema   Musculoskeletal: Normal range of motion. She exhibits no edema.   Neurological: She is alert and oriented to person, place, and time.   Skin: Skin is warm and dry. She is not diaphoretic. No erythema.   Psychiatric:   Flat affect   Nursing note and vitals reviewed.        Laboratory Data:  CBC  Recent Labs   Lab  09/05/18   0522  09/10/18   1918  09/11/18   0601   WBC   15.25*  11.34  11.11   RBC  3.69*  3.97*  3.97*   HGB  8.7*  9.6*  9.7*   HCT  30.5*  32.5*  32.7*   PLT  596*  464*  446*   MCV  83  82  82   MCH  23.6*  24.2*  24.4*   MCHC  28.5*  29.5*  29.7*     CMP  Recent Labs   Lab  09/05/18   0522  09/10/18   1918  09/11/18   0601   CALCIUM  8.5*  9.1  8.9   PROT  5.6*  5.9*  5.8*   NA  134*  134*  131*   K  3.6  3.5  3.9   CO2  32*  31*  25   CL  93*  94*  95   BUN  17  7*  6*   CREATININE  1.0  0.9  0.8   ALKPHOS  92  97  97   ALT  12  9*  10   AST  12  12  15   BILITOT  0.4  0.5  0.5     POCT-Glucose  POCT Glucose   Date Value Ref Range Status   09/11/2018 310 (H) 70 - 110 mg/dL Final   09/10/2018 161 (H) 70 - 110 mg/dL Final     COAGS  Recent Labs   Lab  09/10/18   1938   INR  1.1   APTT  28.7     UA  Recent Labs   Lab  09/10/18   1949   COLORU  Orange*   SPECGRAV  >=1.030*   PHUR  6.0   PROTEINUA  2+*   BACTERIA  Few*     MICRO  Microbiology Results (last 7 days)     Procedure Component Value Units Date/Time    Aerobic culture (Specify Source) **CANNOT BE ORDERED AS STAT** [[id number]] Collected:  09/10/18 1817    Order Status:  Completed Specimen:  Wound from Abdomen Updated:  09/11/18 1254     Aerobic Bacterial Culture --     STAPHYLOCOCCUS AUREUS  Moderate  Susceptibility pending      Blood culture [[id number]] Collected:  09/11/18 1249    Order Status:  Sent Specimen:  Blood Updated:  09/11/18 1249    Blood culture [[id number]] Collected:  09/11/18 1248    Order Status:  Sent Specimen:  Blood Updated:  09/11/18 1249    Urine culture [[id number]]     Order Status:  No result Specimen:  Urine         LIPID PANEL  Lab Results   Component Value Date    CHOL 159 08/23/2018     Lab Results   Component Value Date    HDL 35 (L) 08/23/2018     Lab Results   Component Value Date    LDLCALC 86.6 08/23/2018     Lab Results   Component Value Date    TRIG 187 (H) 08/23/2018     Lab Results   Component Value Date    CHOLHDL 22.0 08/23/2018       Diagnostic Results:  Imaging in last  24 hours:     CXR The position of the right IJ port remains in unchanged position since the previous study.  There is presence of a spinal neurostimulator also in unchanged position.    Patient is rotated to the right.  No acute lobar consolidations or pneumothorax or pulmonary vascular congestion or pleural effusion.  Heart size is within normal limits.    ASSESSMENT/PLAN:   Sheryl Martines is a 63 y.o. 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    -  Ct abdomen with possible dehiscence.  Prior Appy with post-op changes and drainage through wound- will f/u with Dr. Melendrez  - Pain meds  - Cont PO vanc, clinda.  Can stop PO flagyl as ID had said this was less important if pt was unable to tolerate it well due to nausea.  - Will keep NPO for now  - Fluid resuscitation, as pt is likely dry.  Slow given DD on 8/23 echo  - C diff precautions  - On ASA, resuming plavix today    GI ppx: protonix  VTE prophylaxis: lovenox  PT/OT/IS/ambulate    9/11/2018 Julian Carmichael MD  2:17 PM

## 2018-09-11 NOTE — ED NOTES
Pt lying in bed awake and alert, daughter and grandson at bedside. Pt c/o nausea. Noted incision to right lower abdomen, staples intact, small amount of drainage noted leaking from incision. Small area of incision inderjit.

## 2018-09-11 NOTE — PT/OT/SLP EVAL
"Occupational Therapy   Evaluation    Name: Sheryl Martines  MRN: [id number]  Admitting Diagnosis:  Decreased oral intake      Recommendations:     Discharge Recommendations: nursing facility, skilled  Discharge Equipment Recommendations:  walker, rolling  Barriers to discharge:  Decreased caregiver support    History:     Occupational Profile: Per chart review:   Living Environment: Pt lives with dtr in [facility] with one big step up into the SSH; tub/shower combo-pt unable to get into the tub-pt's tub chair does not fit in the tub  Previous level of function: Pt reports since recent d/c, has required assist with all skills and limited in her abilities; per previous note on last admission:  Pt. Reportedly indep-Andrei ADLs and sponge bathes; ambulated Andrei with rollator; takes care of 3 y/o grandson while daughter is at work. 3n1 commode over toilet.  Daughter provides transportation.  Roles and Routines: cares for 3 y/o grandson.  Equipment Used at Home:  rollator, 3-in-1 commode; has access to shower chair but states its "too big" for tub  Assistance upon Discharge: limited by daughter 2/2 works during the day        Past Medical History:   Diagnosis Date    Asthma     Closed compression fracture of fourth lumbar vertebra     COPD (chronic obstructive pulmonary disease)     Coronary artery disease     Diabetes mellitus     Glaucoma     High cholesterol     Hypertension     Iritis     Pulmonary embolus     Stroke     rt sided weakness.       Past Surgical History:   Procedure Laterality Date    ABDOMINAL SURGERY      APPENDECTOMY N/A 8/26/2018    Procedure: APPENDECTOMY;  Surgeon: Bernadine Melendrez MD;  Location: [facility] OR;  Service: General;  Laterality: N/A;    APPENDECTOMY N/A 8/26/2018    Performed by Bernadine Melendrez MD at [facility] OR    APPENDECTOMY, LAPAROSCOPIC---CONVERTED TO OPEN APPENDECTOMY @0950 N/A 8/26/2018    Performed by Bernadine Melendrez MD at [facility] OR    BACK SURGERY      stimulator    " "CATARACT EXTRACTION      HYSTERECTOMY      LAPAROSCOPIC APPENDECTOMY N/A 8/26/2018    Procedure: APPENDECTOMY, LAPAROSCOPIC---CONVERTED TO OPEN APPENDECTOMY @0950;  Surgeon: Bernadine Melendrez MD;  [facility];  Service: General;  Laterality: N/A;       Subjective     Chief Complaint: pt reporting dizziness while supine and sitting EOB; continues to state, " I'm going to pass out. I'm going to pass out"   Patient/Family Comments/goals: none stated at this time     Pain/Comfort:  · Pain Rating 1: (did not report pain at this time)    Patients cultural, spiritual, Confucianist conflicts given the current situation:      Objective:     Communicated with: nsg prior to session.    General Precautions: Standard, fall   Orthopedic Precautions:N/A   Braces: N/A     Occupational Performance:    Bed Mobility:    · Patient completed Rolling/Turning to Right with minimum assistance  · Patient completed Scooting/Bridging with maximal assistance  · Patient completed Supine to Sit with maximal assistance  · Patient completed Sit to Supine with maximal assistance    Functional Mobility/Transfers:  · Unable to perform     Activities of Daily Living:  · Lower Body Dressing: total assistance    · Toileting: total assistance on bed pan    Cognitive/Visual Perceptual:  Cognitive/Psychosocial Skills:     -       Follows Commands/attention:Easily distracted and Follows one-step commands  -       Communication: clear/fluent  -       Safety awareness/insight to disability: impaired   -       Mood/Affect/Coping skills/emotional control: pt did not open eyes throughout session; limited interaction with therapists     Physical Exam:  Balance:    -       poor sitting balance at this time 2/2 reports of dizziness and feeling of passing otu   Postural examination/scapula alignment:    -       Rounded shoulders  -       Forward head  Skin integrity: Wound abdomen   Upper Extremity Range of Motion:    unable to assess at this time   Upper " "Extremity Strength:   unable to assess at this time     [facility] 6 Click ADL:  [facility] Total Score: 14    Treatment & Education:  Limited session/decreased tolerance for axs   Placed off/on bed pan for reports of needing to urinate  Max A to sit and hold self EOB 2/2 dizziness   Education:    Patient left supine with all lines intact, call button in reach, bed alarm on and  nsg notified    Assessment:     Sheryl Martines is a 63 y.o. female with a medical diagnosis of Decreased oral intake.  She presents with the following performance deficits affecting function: weakness, impaired balance, impaired functional mobilty, impaired endurance, impaired self care skills, gait instability, decreased lower extremity function, decreased upper extremity function, decreased safety awareness, decreased ROM, impaired skin, edema.  Pt would benefit from cont OT services in order to maximize functional independence. Recommending SNF at d/c     Rehab Prognosis: Good; patient would benefit from acute skilled OT services to address these deficits and reach maximum level of function.         Clinical Decision Makin.  OT Low:  "Pt evaluation falls under low complexity for evaluation coding due to performance deficits noted in 1-3 areas as stated above and 0 co-morbities affecting current functional status. Data obtained from problem focused assessments. No modifications or assistance was required for completion of evaluation. Only brief occupational profile and history review completed."     Plan:     Patient to be seen 5 x/week to address the above listed problems via self-care/home management, therapeutic activities, therapeutic exercises  · Plan of Care Expires: 10/11/18  · Plan of Care Reviewed with: patient    This Plan of care has been discussed with the patient who was involved in its development and understands and is in agreement with the identified goals and treatment plan    GOALS:   Multidisciplinary Problems     " Occupational Therapy Goals        Problem: Occupational Therapy Goal    Goal Priority Disciplines Outcome Interventions   Occupational Therapy Goal     OT, PT/OT Ongoing (interventions implemented as appropriate)    Description:  Goals to be met by: 10/11/18     Patient will increase functional independence with ADLs by performing:    LE Dressing with Minimal Assistance.  Grooming while seated with Stand-by Assistance.  Toileting from bedside commode with Minimal Assistance for hygiene and clothing management.   Supine to sit with Stand-by Assistance.  Stand pivot transfers with Contact Guard Assistance.  Toilet transfer to bedside commode with Contact Guard Assistance.  Increased functional strength to WFL for self care skills and functional mobility.  Upper extremity exercise program x10 reps per handout, with independence.                      Time Tracking:     OT Date of Treatment: 09/11/18  OT Start Time: 1108  OT Stop Time: 1122  OT Total Time (min): 14 min    Billable Minutes:Evaluation 14    Vianey Bang OT  9/11/2018

## 2018-09-11 NOTE — PLAN OF CARE
Problem: Physical Therapy Goal  Goal: Physical Therapy Goal  Goals to be met by: 10/11/2018     Patient will increase functional independence with mobility by performin. Supine to sit with Stand-by Assistance  2. Sit to stand transfer with Stand-by Assistance  3. Bed to chair transfer with Stand-by Assistance using Rolling Walker  4. Gait  x 12 feet with Stand-by Assistance using Rolling Walker.     Outcome: Ongoing (interventions implemented as appropriate)  Recommend SNF  No apparent DME needs

## 2018-09-11 NOTE — SIGNIFICANT EVENT
Pt observed on rounds to be in pain, nauseated and breathing uncomfortably.  ABG obtained 7.4/2/48/48/31/7 with sat of 83.  Placed on 2 L of oxygen NC.    Breathing upon re-eval to be labored.  Prolonged expiratory phase, abdominal breathing- perhaps in an attempt to create auto-peep.  No narcotics given today.  Obtaining additional repeat labs and cultures.    Vitals:    09/11/18 1352   BP: (!) 84/55   Pulse: 98   Resp:    Temp:      Due to increased work of breathing and low BP, giving L fluid bolus and transferring to ICU for close monitoring.    Dr. Carmichael informed me of patient's condition.  Feel this patient will need to be transferred to ICU.  Because of Sepsis, patient needs to have her IV access catheter that she has had before last admission , removed.  Will need to culture this catheter tip.  Julian Carmichael  2:16 PM  09/11/2018

## 2018-09-11 NOTE — PROVIDER PROGRESS NOTES - EMERGENCY DEPT.
Encounter Date: 9/10/2018    ED Physician Progress Notes       SCRIBE NOTE: I, Caryn Ford, am scribing for, and in the presence of,  Dr. Duong.  Physician Statement: I, Dr. Duong, personally performed the services described in this documentation as scribed by Caryn Ford in my presence, and it is both accurate and complete.          **This is an assumption of care note**    Case accepted from Dr. Davila at 8 PM, pending CT results  I agree with previous physician's history/physical and overall assessment.   *** reviewed which showed ***.       PHYSICAL EXAMINATION:  GENERAL:   Alert and oriented x3, no acute distress  VITAL SIGNS:  Per nurse's note, reviewed by me .  SKIN:  Warm, dry; no cyanosis; no rash, no pallor  HEAD:  Atraumatic, normocephalic  EYES:  no conjunctival pallor, no scleral icterus, EOMI.  ENMT:  Pharynx:  no erythema; airway patent: no stridor; dry mucous membranes.  NECK:  no tenderness, normal ROM, no lymphadenopathy.  CHEST AND RESPIRATORY:  No distress, no rales, no rhonchi, no wheezes.  HEART AND CARDIOVASCULAR:  Normal rate, no irregularity; no murmur,   ABDOMEN AND GI:  Soft;  no tenderness, no guarding  EXTREMITIES:  no deformity, no edema, no tenderness.  NEURO AND PSYCH:  Mental status as above.  Cranial nerves grossly intact; strength symmetric, sensation grossly intact bilaterally. Gait normal.    LABS:  Labs Reviewed   CBC W/ AUTO DIFFERENTIAL - Abnormal; Notable for the following components:       Result Value    RBC 3.97 (*)     Hemoglobin 9.6 (*)     Hematocrit 32.5 (*)     MCH 24.2 (*)     MCHC 29.5 (*)     RDW 18.6 (*)     Platelets 464 (*)     MPV 8.6 (*)     Mono # 1.9 (*)     Mono% 16.8 (*)     All other components within normal limits   COMPREHENSIVE METABOLIC PANEL - Abnormal; Notable for the following components:    Sodium 134 (*)     Chloride 94 (*)     CO2 31 (*)     Glucose 170 (*)     BUN, Bld 7 (*)     Total Protein 5.9 (*)     Albumin 2.3 (*)     ALT 9 (*)      All other components within normal limits   URINALYSIS - Abnormal; Notable for the following components:    Color, UA Orange (*)     Appearance, UA Cloudy (*)     Specific Gravity, UA >=1.030 (*)     Protein, UA 2+ (*)     Glucose, UA Trace (*)     Bilirubin (UA) 1+ (*)     Occult Blood UA Trace (*)     Nitrite, UA Positive (*)     Leukocytes, UA Trace (*)     All other components within normal limits   URINALYSIS MICROSCOPIC - Abnormal; Notable for the following components:    RBC, UA 50 (*)     WBC, UA 9 (*)     Bacteria, UA Few (*)     All other components within normal limits   POCT GLUCOSE - Abnormal; Notable for the following components:    POCT Glucose 161 (*)     All other components within normal limits   CULTURE, AEROBIC  (SPECIFY SOURCE)         IMAGING:  Imaging Results          CT Abdomen Pelvis With Contrast (Final result)  Result time 09/10/18 21:00:09    Final result by Randal Dias MD (09/10/18 21:00:09)                 Impression:      Postoperative changes of recent appendectomy with increased inflammatory changes in the right lower abdominal quadrant.    Interval dehiscence of the right lateral abdominal wall with ill-defined fluid from the right lower quadrant appear to tract into the right abdominal wall defect.  Clinical correlation and surgical consultation is recommended.    Stable appearance of a 7 cm ill-defined soft tissue collection in the right hemipelvis.  The findings most likely represent a resolving hematoma.    Additional findings as above.    Case discussed with Dr. Duong on 09/10/18 at 8:59 p.m.      Electronically signed by: Randal Dias MD  Date:    09/10/2018  Time:    21:00             Narrative:    EXAMINATION:  CT ABDOMEN PELVIS WITH CONTRAST    CLINICAL HISTORY:  abdominal pain, s/p appendectomy;    TECHNIQUE:  Low dose axial images, sagittal and coronal reformations were obtained from the lung bases to the pubic symphysis following the IV administration of 75 mL of  Omnipaque 350 .  Oral contrast was not given. Delayed images were obtained.    COMPARISON:  CT scan abdomen and pelvis dated 08/31/2018.    FINDINGS:  There are no pleural effusions.  There is no evidence of a pneumothorax.  No airspace opacity is present.  No discrete pulmonary nodule is identified.    The heart is unremarkable.  There is normal tapering of the abdominal aorta.  There are calcifications along the course of the abdominal aorta and branch vessels.  There is a probable circumaortic left renal vein.  The portal vein and mesenteric vessels are patent.  The IVC and the remainder of the venous structures are within normal limits.  There is no evidence of lymphadenopathy in the abdomen or pelvis.    The esophagus is within normal limits.  The stomach is unremarkable.  The duodenum is within normal limits.  There has been resolution of the previous small bowel obstruction.  Minimal distention is identified involving the small bowel/ileus.  There are postoperative changes of prior appendectomy.  There are persistent inflammatory changes within the right lower abdominal quadrant.  There is colonic diverticula without evidence of acute diverticulitis.    The liver is unremarkable.  The gallbladder is within normal limits.  The biliary tree is unremarkable.  The spleen is unremarkable.  The pancreas is within normal limits.    The adrenal glands are unremarkable.  There is stable appearance of a subcentimeter hypodensities in the kidneys that are too small for complete characterization.  There is a nonobstructing stone in the upper pole of the right kidney.  There is persistent distention of the right ureter.  The inflammatory changes surrounding the right ureter.  This is incompletely evaluated secondary to streak artifact from hip hardware..  The urinary bladder appears decompressed.    There are persistent inflammatory changes in the right lower abdominal quadrant.  There is a stable appearance of a 7.1 x  4.4 cm high attenuation collection within the right pelvic sidewall.  The overall inflammatory changes in the right lower abdominal quadrant has progressed compared to the prior examination.  No definitive evidence of free air is present.  There has been interval dehiscence of the right anterior abdominal wall.  There is increased attenuation involving the right lateral rectus abdominus musculature.  There is infiltration involving the right rectus abdominus muscle with multiple areas of low attenuation.  There is ill-defined fluid tracking from the right lower quadrant into the right lateral abdominal wall defect.  There is an umbilical hernia containing omental fat.    There is an intrathecal lead in place.  There are postoperative changes of left hip arthroplasty.  There are degenerative changes in the osseous structures.  There is stable appearance of multiple compression fractures involving the lumbar spine.                                  ED COURSE:      9:40 PM Imaging results and case discussed with Dr. Colvin, felt if the patient was doing well and tolerating PO, agrees with plan to change antibiotic from Clindamycin to Levaquin.   9:58 PM Spoke with patient, looked uncomfortable, dehydrated and debilitated and I believe the patient should be admitted.  9:58 PM Case discussed with [facility] resident, will come to evaluate and admit the patient.  10:02 PM Updated Dr. Colvin on plan        DIAGNOSIS:  No diagnosis found.       DISPOSITION:

## 2018-09-12 PROBLEM — A41.89 SEPSIS DUE TO OTHER ETIOLOGY: Status: ACTIVE | Noted: 2018-09-12

## 2018-09-12 LAB
ALBUMIN SERPL BCP-MCNC: 1.9 G/DL
ALLENS TEST: ABNORMAL
ALP SERPL-CCNC: 85 U/L
ALT SERPL W/O P-5'-P-CCNC: 9 U/L
ANION GAP SERPL CALC-SCNC: 7 MMOL/L
AST SERPL-CCNC: 11 U/L
BACTERIA SPEC AEROBE CULT: NORMAL
BASOPHILS # BLD AUTO: 0 K/UL
BASOPHILS NFR BLD: 0 %
BILIRUB SERPL-MCNC: 0.3 MG/DL
BUN SERPL-MCNC: 5 MG/DL
CALCIUM SERPL-MCNC: 7.8 MG/DL
CHLORIDE SERPL-SCNC: 97 MMOL/L
CO2 SERPL-SCNC: 28 MMOL/L
CREAT SERPL-MCNC: 0.8 MG/DL
DELSYS: ABNORMAL
DIFFERENTIAL METHOD: ABNORMAL
EOSINOPHIL # BLD AUTO: 0 K/UL
EOSINOPHIL NFR BLD: 0.2 %
ERYTHROCYTE [DISTWIDTH] IN BLOOD BY AUTOMATED COUNT: 18.9 %
EST. GFR  (AFRICAN AMERICAN): >60 ML/MIN/1.73 M^2
EST. GFR  (NON AFRICAN AMERICAN): >60 ML/MIN/1.73 M^2
GLUCOSE SERPL-MCNC: 287 MG/DL
HCO3 UR-SCNC: 28.4 MMOL/L (ref 24–28)
HCT VFR BLD AUTO: 24.8 %
HGB BLD-MCNC: 7.4 G/DL
LYMPHOCYTES # BLD AUTO: 0.4 K/UL
LYMPHOCYTES NFR BLD: 4.1 %
MAGNESIUM SERPL-MCNC: 1.8 MG/DL
MCH RBC QN AUTO: 24.5 PG
MCHC RBC AUTO-ENTMCNC: 29.8 G/DL
MCV RBC AUTO: 82 FL
MONOCYTES # BLD AUTO: 0.4 K/UL
MONOCYTES NFR BLD: 4.5 %
NEUTROPHILS # BLD AUTO: 7.8 K/UL
NEUTROPHILS NFR BLD: 91.1 %
PCO2 BLDA: 47.1 MMHG (ref 35–45)
PH SMN: 7.39 [PH] (ref 7.35–7.45)
PHOSPHATE SERPL-MCNC: 2.7 MG/DL
PLATELET # BLD AUTO: 333 K/UL
PMV BLD AUTO: 8.6 FL
PO2 BLDA: 73 MMHG (ref 80–100)
POC BE: 3 MMOL/L
POC SATURATED O2: 94 % (ref 95–100)
POC TCO2: 30 MMOL/L (ref 23–27)
POCT GLUCOSE: 191 MG/DL (ref 70–110)
POCT GLUCOSE: 215 MG/DL (ref 70–110)
POCT GLUCOSE: 267 MG/DL (ref 70–110)
POCT GLUCOSE: 304 MG/DL (ref 70–110)
POTASSIUM SERPL-SCNC: 3.8 MMOL/L
PROT SERPL-MCNC: 4.9 G/DL
RBC # BLD AUTO: 3.02 M/UL
SAMPLE: ABNORMAL
SITE: ABNORMAL
SODIUM SERPL-SCNC: 132 MMOL/L
T4 SERPL-MCNC: 5.8 UG/DL
TSH SERPL DL<=0.005 MIU/L-ACNC: 1.56 UIU/ML
WBC # BLD AUTO: 8.58 K/UL

## 2018-09-12 PROCEDURE — 25000242 PHARM REV CODE 250 ALT 637 W/ HCPCS: Performed by: STUDENT IN AN ORGANIZED HEALTH CARE EDUCATION/TRAINING PROGRAM

## 2018-09-12 PROCEDURE — 63600175 PHARM REV CODE 636 W HCPCS: Performed by: STUDENT IN AN ORGANIZED HEALTH CARE EDUCATION/TRAINING PROGRAM

## 2018-09-12 PROCEDURE — 25000003 PHARM REV CODE 250: Performed by: STUDENT IN AN ORGANIZED HEALTH CARE EDUCATION/TRAINING PROGRAM

## 2018-09-12 PROCEDURE — 20000000 HC ICU ROOM

## 2018-09-12 PROCEDURE — 94640 AIRWAY INHALATION TREATMENT: CPT

## 2018-09-12 PROCEDURE — 80053 COMPREHEN METABOLIC PANEL: CPT

## 2018-09-12 PROCEDURE — 85025 COMPLETE CBC W/AUTO DIFF WBC: CPT

## 2018-09-12 PROCEDURE — 63600175 PHARM REV CODE 636 W HCPCS: Performed by: FAMILY MEDICINE

## 2018-09-12 PROCEDURE — 36415 COLL VENOUS BLD VENIPUNCTURE: CPT

## 2018-09-12 PROCEDURE — 84436 ASSAY OF TOTAL THYROXINE: CPT

## 2018-09-12 PROCEDURE — 94799 UNLISTED PULMONARY SVC/PX: CPT

## 2018-09-12 PROCEDURE — 93005 ELECTROCARDIOGRAM TRACING: CPT

## 2018-09-12 PROCEDURE — 93010 ELECTROCARDIOGRAM REPORT: CPT | Mod: ,,, | Performed by: INTERNAL MEDICINE

## 2018-09-12 PROCEDURE — 84443 ASSAY THYROID STIM HORMONE: CPT

## 2018-09-12 PROCEDURE — 83735 ASSAY OF MAGNESIUM: CPT

## 2018-09-12 PROCEDURE — 84100 ASSAY OF PHOSPHORUS: CPT

## 2018-09-12 RX ORDER — CALCIUM CARBONATE 200(500)MG
500 TABLET,CHEWABLE ORAL ONCE
Status: COMPLETED | OUTPATIENT
Start: 2018-09-12 | End: 2018-09-12

## 2018-09-12 RX ORDER — IPRATROPIUM BROMIDE AND ALBUTEROL SULFATE 2.5; .5 MG/3ML; MG/3ML
3 SOLUTION RESPIRATORY (INHALATION) EVERY 4 HOURS
Status: DISCONTINUED | OUTPATIENT
Start: 2018-09-12 | End: 2018-09-20 | Stop reason: HOSPADM

## 2018-09-12 RX ORDER — SODIUM CHLORIDE, SODIUM LACTATE, POTASSIUM CHLORIDE, CALCIUM CHLORIDE 600; 310; 30; 20 MG/100ML; MG/100ML; MG/100ML; MG/100ML
INJECTION, SOLUTION INTRAVENOUS CONTINUOUS
Status: DISCONTINUED | OUTPATIENT
Start: 2018-09-12 | End: 2018-09-12

## 2018-09-12 RX ADMIN — PIPERACILLIN AND TAZOBACTAM 4.5 G: 4; .5 INJECTION, POWDER, LYOPHILIZED, FOR SOLUTION INTRAVENOUS; PARENTERAL at 10:09

## 2018-09-12 RX ADMIN — INSULIN ASPART 2 UNITS: 100 INJECTION, SOLUTION INTRAVENOUS; SUBCUTANEOUS at 05:09

## 2018-09-12 RX ADMIN — CALCIUM CARBONATE (ANTACID) CHEW TAB 500 MG 500 MG: 500 CHEW TAB at 07:09

## 2018-09-12 RX ADMIN — ALBUTEROL SULFATE 2 PUFF: 90 AEROSOL, METERED RESPIRATORY (INHALATION) at 08:09

## 2018-09-12 RX ADMIN — THEOPHYLLINE ANHYDROUS 200 MG: 100 CAPSULE, EXTENDED RELEASE ORAL at 09:09

## 2018-09-12 RX ADMIN — Medication 500 MG: at 11:09

## 2018-09-12 RX ADMIN — IPRATROPIUM BROMIDE AND ALBUTEROL SULFATE 3 ML: .5; 3 SOLUTION RESPIRATORY (INHALATION) at 11:09

## 2018-09-12 RX ADMIN — PREDNISONE 10 MG: 10 TABLET ORAL at 08:09

## 2018-09-12 RX ADMIN — VANCOMYCIN HYDROCHLORIDE 1000 MG: 1 INJECTION, POWDER, LYOPHILIZED, FOR SOLUTION INTRAVENOUS at 04:09

## 2018-09-12 RX ADMIN — INSULIN ASPART 3 UNITS: 100 INJECTION, SOLUTION INTRAVENOUS; SUBCUTANEOUS at 12:09

## 2018-09-12 RX ADMIN — PIPERACILLIN AND TAZOBACTAM 4.5 G: 4; .5 INJECTION, POWDER, LYOPHILIZED, FOR SOLUTION INTRAVENOUS; PARENTERAL at 12:09

## 2018-09-12 RX ADMIN — PANTOPRAZOLE SODIUM 40 MG: 40 TABLET, DELAYED RELEASE ORAL at 09:09

## 2018-09-12 RX ADMIN — PREDNISONE 10 MG: 10 TABLET ORAL at 09:09

## 2018-09-12 RX ADMIN — ENOXAPARIN SODIUM 40 MG: 100 INJECTION SUBCUTANEOUS at 04:09

## 2018-09-12 RX ADMIN — IPRATROPIUM BROMIDE AND ALBUTEROL SULFATE 3 ML: .5; 3 SOLUTION RESPIRATORY (INHALATION) at 08:09

## 2018-09-12 RX ADMIN — Medication 500 MG: at 05:09

## 2018-09-12 RX ADMIN — SODIUM CHLORIDE, SODIUM LACTATE, POTASSIUM CHLORIDE, AND CALCIUM CHLORIDE: .6; .31; .03; .02 INJECTION, SOLUTION INTRAVENOUS at 01:09

## 2018-09-12 RX ADMIN — SIMVASTATIN 40 MG: 20 TABLET, FILM COATED ORAL at 08:09

## 2018-09-12 RX ADMIN — DULOXETINE 60 MG: 30 CAPSULE, DELAYED RELEASE ORAL at 09:09

## 2018-09-12 RX ADMIN — CLOPIDOGREL BISULFATE 75 MG: 75 TABLET ORAL at 09:09

## 2018-09-12 RX ADMIN — PRAZOSIN HYDROCHLORIDE 5 MG: 1 CAPSULE ORAL at 08:09

## 2018-09-12 RX ADMIN — VANCOMYCIN HYDROCHLORIDE 1000 MG: 1 INJECTION, POWDER, LYOPHILIZED, FOR SOLUTION INTRAVENOUS at 05:09

## 2018-09-12 RX ADMIN — ACETAMINOPHEN 650 MG: 325 TABLET, FILM COATED ORAL at 06:09

## 2018-09-12 RX ADMIN — ONDANSETRON 4 MG: 2 INJECTION INTRAMUSCULAR; INTRAVENOUS at 12:09

## 2018-09-12 RX ADMIN — INSULIN ASPART 4 UNITS: 100 INJECTION, SOLUTION INTRAVENOUS; SUBCUTANEOUS at 07:09

## 2018-09-12 RX ADMIN — ASPIRIN 81 MG: 81 TABLET, COATED ORAL at 09:09

## 2018-09-12 RX ADMIN — FUROSEMIDE 40 MG: 40 TABLET ORAL at 09:09

## 2018-09-12 RX ADMIN — LACTOBACILLUS TAB 4 TABLET: TAB at 05:09

## 2018-09-12 RX ADMIN — LACTOBACILLUS TAB 4 TABLET: TAB at 01:09

## 2018-09-12 RX ADMIN — PIPERACILLIN AND TAZOBACTAM 4.5 G: 4; .5 INJECTION, POWDER, LYOPHILIZED, FOR SOLUTION INTRAVENOUS; PARENTERAL at 04:09

## 2018-09-12 NOTE — PROGRESS NOTES
"Surgery follow up  /60 (BP Location: Left arm, Patient Position: Lying)   Pulse 87   Temp 98.4 °F (36.9 °C) (Oral)   Resp (!) 34   Ht 5' 1" (1.549 m)   Wt 82.8 kg (182 lb 8.7 oz)   LMP  (LMP Unknown)   SpO2 (!) 94%   Breastfeeding? No   BMI 34.49 kg/m²   I/O last 3 completed shifts:  In: 2869.2 [P.O.:20; I.V.:2399.2; IV Piggyback:450]  Out: 619 [Urine:619]  I/O this shift:  In: 568.8 [P.O.:250; I.V.:218.8; IV Piggyback:100]  Out: 1036 [Urine:1036]  Recent Results (from the past 336 hour(s))   CBC with Automated Differential    Collection Time: 09/12/18  3:53 AM   Result Value Ref Range    WBC 8.58 3.90 - 12.70 K/uL    Hemoglobin 7.4 (L) 12.0 - 16.0 g/dL    Hematocrit 24.8 (L) 37.0 - 48.5 %    Platelets 333 150 - 350 K/uL   CBC auto differential    Collection Time: 09/11/18  2:22 PM   Result Value Ref Range    WBC 8.12 3.90 - 12.70 K/uL    Hemoglobin 8.1 (L) 12.0 - 16.0 g/dL    Hematocrit 27.1 (L) 37.0 - 48.5 %    Platelets 345 150 - 350 K/uL   CBC with Automated Differential    Collection Time: 09/11/18  6:01 AM   Result Value Ref Range    WBC 11.11 3.90 - 12.70 K/uL    Hemoglobin 9.7 (L) 12.0 - 16.0 g/dL    Hematocrit 32.7 (L) 37.0 - 48.5 %    Platelets 446 (H) 150 - 350 K/uL   No results found for this or any previous visit (from the past 336 hour(s)).  Abdomen soft non tender , wound serosanguineous drainage ,     Doubt abscess continue present treatment.  "

## 2018-09-12 NOTE — PLAN OF CARE
Problem: Patient Care Overview  Goal: Plan of Care Review  Outcome: Revised  Pt AAO x4, VSS and afebrile for night. No C/O SOB or increased work of breathing. Pt abdominally breathes, she stated. No need for vasopressors for shift. Ritchie was placed and L IJ TLC placed and L Radial A-line placed during shift. Pt has creamy brown stools. Abdominal incisions CDI. Dressing changed during shift. Will continue to monitor.

## 2018-09-12 NOTE — ASSESSMENT & PLAN NOTE
Ms Martines returned to hospital after a recent admission for appendicitis and CDiff. The symptoms prompting her return include malaise, fever and drainage from appendectomy site. After admission she was also noted to have SOB, hypoxia and hypotension. The CDiff cultures were negative and she is still on oral vanc. Her hypotension and fever are concerning for sepsis but the source is undetermined, with likely etiologies being the abdomen and pneumonia (Hx of hypoxia and SOB) although her chest imaging has not been impressive. Surgery assessment has been very helpful in this patient with Hx of abd pain, incision site drainage and imaging showing possible fluid collection.     Recs  - agree with broad spectrum antibiotics in this ill looking patient with recent deterioration after admission  - will follow up blood cultures, urine cultures  - agree that her indwelling hardware such as the port may be a source of infection, but as yet, no bacteremia has been identified, would defer removal for now  - complete course of oral vanc for CDiff

## 2018-09-12 NOTE — PROGRESS NOTES
Progress Note  [facility]  Admit Date: 9/10/2018   LOS: 1 day   SUBJECTIVE:   Follow-up For: decreased PO intake    Transferred to ICU yesterday due to increased work of breathing (using abdominal muscles, apneic episodes), hypotension.  Started on broad spectrum Abx, concern for hypotension and levophed ordered, but was never needed to maintain MAP > 65.    Pt feels slightly better.    Port in place for approx 10 years from [place].  Not flushing, pulling back for nursing.  Central line placed.    ROS   See above    OBJECTIVE:   Vital Signs (Most Recent)  Temp: 98.5 °F (36.9 °C) (09/12/18 0701)  Pulse: 84 (09/12/18 0600)  Resp: (!) 31 (09/12/18 0600)  BP: 117/70 (09/12/18 0600)  SpO2: 97 % (09/12/18 0600)    I & O (Last 24H):    Intake/Output Summary (Last 24 hours) at 9/12/2018 0831  Last data filed at 9/12/2018 0600  Gross per 24 hour   Intake 2869.17 ml   Output 393 ml   Net 2476.17 ml     Wt Readings from Last 3 Encounters:   09/11/18 83 kg (182 lb 15.7 oz)   09/05/18 88.9 kg (195 lb 15.8 oz)   08/14/18 81.6 kg (180 lb)       Current Diet Order   Procedures    Diet NPO Except for: Medication     Order Specific Question:   Except for     Answer:   Medication        Physical Exam   Constitutional: She is oriented to person, place, and time. No distress.   Deconditioned, fatigued   HENT:   Head: Normocephalic.   Eyes: Conjunctivae are normal.   Neck: Normal range of motion.   Cardiovascular: Normal rate, regular rhythm and normal heart sounds.   No murmur heard.  Pulmonary/Chest: Breath sounds normal. No respiratory distress. She has no wheezes. She has no rales.   Port in place for IV sticks, shallow breathing   Abdominal: Soft. Bowel sounds are normal. There is tenderness. There is no rebound.   Staples in place, draining serosang discharge, TTP, no erythema   Musculoskeletal: Normal range of motion. She exhibits no edema.   Neurological: She is alert and oriented to person, place, and time.   Skin:  Skin is warm and dry. She is not diaphoretic. No erythema.   Psychiatric:   Flat affect   Nursing note and vitals reviewed.        Laboratory Data:  CBC  Recent Labs   Lab  09/11/18   0601 09/11/18   1422  09/12/18   0353   WBC  11.11  8.12  8.58   RBC  3.97*  3.28*  3.02*   HGB  9.7*  8.1*  7.4*   HCT  32.7*  27.1*  24.8*   PLT  446*  345  333   MCV  82  83  82   MCH  24.4*  24.7*  24.5*   MCHC  29.7*  29.9*  29.8*     CMP  Recent Labs   Lab  09/11/18   0601 09/11/18   1422 09/12/18   0353   CALCIUM  8.9  8.2*  7.8*   PROT  5.8*  4.8*  4.9*   NA  131*  130*  132*   K  3.9  3.7  3.8   CO2  25  28  28   CL  95  95  97   BUN  6*  6*  5*   CREATININE  0.8  0.9  0.8   ALKPHOS  97  83  85   ALT  10  8*  9*   AST  15  11  11   BILITOT  0.5  0.4  0.3     POCT-Glucose  POCT Glucose   Date Value Ref Range Status   09/12/2018 304 (H) 70 - 110 mg/dL Final   09/11/2018 339 (H) 70 - 110 mg/dL Final   09/11/2018 310 (H) 70 - 110 mg/dL Final   09/10/2018 161 (H) 70 - 110 mg/dL Final     COAGS  Recent Labs   Lab  09/10/18   1938   INR  1.1   APTT  28.7     MICRO  Microbiology Results (last 7 days)     Procedure Component Value Units Date/Time    Blood culture [[id number]] Collected:  09/11/18 1249    Order Status:  Completed Specimen:  Blood Updated:  09/12/18 0115     Blood Culture, Routine No Growth to date    Blood culture [[id number]] Collected:  09/11/18 1248    Order Status:  Completed Specimen:  Blood Updated:  09/12/18 0115     Blood Culture, Routine No Growth to date    Urine culture [[id number]] Collected:  09/11/18 2152    Order Status:  Sent Specimen:  Urine, Catheterized Updated:  09/12/18 0029    Clostridium difficile EIA [[id number]] Collected:  09/11/18 1928    Order Status:  Completed Specimen:  Stool Updated:  09/11/18 2008     C. diff Antigen Negative     C difficile Toxins A+B, EIA Negative     Comment: Testing not recommended for children <24 months old.       Aerobic culture [[id number]]     Order Status:  No  result Specimen:  Catheter Tip     Culture, Anaerobe [[id number]]     Order Status:  No result Specimen:  Catheter Tip     Fungus culture [[id number]]     Order Status:  No result Specimen:  Catheter Tip     Aerobic culture (Specify Source) **CANNOT BE ORDERED AS STAT** [[id number]] Collected:  09/10/18 1817    Order Status:  Completed Specimen:  Wound from Abdomen Updated:  09/11/18 1254     Aerobic Bacterial Culture --     STAPHYLOCOCCUS AUREUS  Moderate  Susceptibility pending          LIPID PANEL  Lab Results   Component Value Date    CHOL 159 08/23/2018     Lab Results   Component Value Date    HDL 35 (L) 08/23/2018     Lab Results   Component Value Date    LDLCALC 86.6 08/23/2018     Lab Results   Component Value Date    TRIG 187 (H) 08/23/2018     Lab Results   Component Value Date    CHOLHDL 22.0 08/23/2018       Diagnostic Results:  Imaging in last 24 hours:     CXR 9/11/2018  Monitoring leads overlie the chest.  Patient is slightly rotated.  Interval placement of left IJ CVC with tip projected over the cavoatrial junction region.  No large pneumothorax or new focal opacity.  Otherwise, grossly stable radiographic appearance of the chest.    ASSESSMENT/PLAN:   Sheryl Martines is a 63 y.o. 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    Neuro  CAM ICU neg  AAO  No sedation, minimal analgesia    CV  Levophed ordered, but BP maintaining without  Given additional fluids, concern for septic shock  Monitor in ICU  Cont ASA, plavix ofr CAD  8/23 ECHO with evidence of DD    FEN/GI  Monitoring lytes, currently stable   NPO currently, will likely be able to resume diet  Stop MIVF for now and monitor fluid status as pt's UOP is on low side  Want to avoid overloading with fluid  Ct scan with possible dehiscence of wound, will f/u with Dr. Melendrez.  Reportedly no intervention for now.    Renal  BUN/Cr stable 5/0.8; GFR >60  UOP appprox 30 cc/hr overnight  No known  kidney disease  Will flush nieves to ensure working properly    Heme//ID  On IV vanc, IV zosyn, PO vanc for C diff.  Clinda stopped due to escalation of abx overall.  ID was ok with stopping PO flagyl because of nausea  H/H drifting down with increased hydration, but FOBT neg, no signs of bleeding otherwise  Urine culture pending  C diff neg    Endocrine  Pt has been on Prednisone 10 mg BID for appprox 10 years, reportedly for asthma  Has been continued while in-patient  Will need to be weaned off, likely induced adrenal insufficiency due to this.  Glucoses elevated; increase Detemir 12 units BID, on SSI    GI ppx: protonix  VTE prophylaxis: lovenox  PT/OT/IS/ambulate    Dispo:  Pending clinical improvement, Dr. Melendrez's recs, likely need SNF on discharge (was recommended on prior admission but family declined.  May now be open to it)    9/12/2018 Julian Carmichael MD  2:17 PM

## 2018-09-12 NOTE — SUBJECTIVE & OBJECTIVE
Past Medical History:   Diagnosis Date    Asthma     Closed compression fracture of fourth lumbar vertebra     COPD (chronic obstructive pulmonary disease)     Coronary artery disease     Diabetes mellitus     Glaucoma     High cholesterol     Hypertension     Iritis     Pulmonary embolus     Stroke     rt sided weakness.       Past Surgical History:   Procedure Laterality Date    ABDOMINAL SURGERY      APPENDECTOMY N/A 8/26/2018    Procedure: APPENDECTOMY;  Surgeon: Bernadine Melendrez MD;  Location: [facility] OR;  Service: General;  Laterality: N/A;    APPENDECTOMY N/A 8/26/2018    Performed by Bernadine Melendrez MD at [facility] OR    APPENDECTOMY, LAPAROSCOPIC---CONVERTED TO OPEN APPENDECTOMY @0950 N/A 8/26/2018    Performed by [facility]ine [facility]rez MD at [facility] OR    BACK SURGERY      stimulator    CATARACT EXTRACTION      HYSTERECTOMY      LAPAROSCOPIC APPENDECTOMY N/A 8/26/2018    Procedure: APPENDECTOMY, LAPAROSCOPIC---CONVERTED TO OPEN APPENDECTOMY @0950;  Surgeon: Bernadine Melendrez MD;  Location: [facility] OR;  Service: General;  Laterality: N/A;       Review of patient's allergies indicates:   Allergen Reactions    Ace inhibitors Swelling    Corticosteroids (glucocorticoids)     Hydralazine analogues     Tetracyclines Swelling    Travatan (with benzalkonium) [travoprost (benzalkonium)]        Medications:  Medications Prior to Admission   Medication Sig    aspirin (ECOTRIN) 81 MG EC tablet Take 1 tablet (81 mg total) by mouth once daily.    clopidogrel (PLAVIX) 75 mg tablet Take 75 mg by mouth once daily.    diltiaZEM (CARDIZEM CD) 180 MG 24 hr capsule Take 180 mg by mouth once daily.    DULoxetine (CYMBALTA) 60 MG capsule Take 60 mg by mouth once daily.    fluticasone-salmeterol 500-50 mcg/dose (ADVAIR DISKUS) 500-50 mcg/dose DsDv diskus inhaler Inhale 1 puff into the lungs 2 (two) times daily. Controller    furosemide (LASIX) 40 MG tablet Take 40 mg by mouth once daily.     gabapentin  (NEURONTIN) 300 MG capsule Take 300 mg by mouth 3 (three) times daily.    lansoprazole (PREVACID) 30 MG capsule Take 30 mg by mouth once daily.    losartan potassium (COZAAR ORAL) Take 100 mg by mouth.     metroNIDAZOLE (FLAGYL) 500 MG tablet Take 1 tablet (500 mg total) by mouth every 8 (eight) hours. for 9 days    ondansetron (ZOFRAN) 4 MG tablet Take 1 tablet (4 mg total) by mouth every 6 (six) hours as needed.    prazosin (MINIPRESS) 5 MG capsule Take 5 mg by mouth 2 (two) times daily.     predniSONE (DELTASONE) 10 MG tablet Take 10 mg by mouth 2 (two) times daily.     pyridoxine, vitamin B6, (VITAMIN B-6) 50 MG Tab Take 50 mg by mouth once daily.    simvastatin (ZOCOR) 40 MG tablet Take 40 mg by mouth every evening.    sitagliptin phosphate (JANUVIA ORAL) Take by mouth.    theophylline (THEODUR) 200 MG 12 hr tablet Take 200 mg by mouth once daily.     theophylline anhydrous (UNIPHYL ORAL) Take by mouth.    albuterol (ACCUNEB) 0.63 mg/3 mL Nebu Take 0.83 mg by nebulization every 6 (six) hours as needed. Rescue     baclofen (LIORESAL) 10 MG tablet Take 10 mg by mouth 3 (three) times daily.    clindamycin (CLEOCIN) 300 MG capsule Take 1 capsule (300 mg total) by mouth 2 (two) times daily. for 7 days    diclofenac sodium 1 % Gel Apply topically once daily. for 10 days as directed    insulin detemir U-100 (LEVEMIR) 100 unit/mL injection Inject 20 Units into the skin 2 (two) times daily.     insulin lispro (HUMALOG PEN SUBQ) Inject into the skin.    traMADol (ULTRAM-ER) 100 MG Tb24 Take 50 mg by mouth daily as needed.    vancomycin 25 mg/mL solution Take 20 mLs (500 mg total) by mouth every 6 (six) hours. for 9 days     Antibiotics (From admission, onward)    Start     Stop Route Frequency Ordered    09/12/18 0530  vancomycin in dextrose 5 % 1 gram/250 mL IVPB 1,000 mg  (Vancomycin IVPB with levels panel)      -- IV Every 12 hours (non-standard times) 09/11/18 1607    09/11/18 1592   piperacillin-tazobactam 4.5 g in dextrose 5 % 100 mL IVPB (ready to mix system)      -- IV Every 8 hours (non-standard times) 09/11/18 1607    09/11/18 0130  vancomycin 250mg / 10ml oral suspension 500 mg      -- Oral Every 6 hours 09/11/18 0125        Antifungals (From admission, onward)    None        Antivirals (From admission, onward)    None           Immunization History   Administered Date(s) Administered    PPD Test 08/28/2018       Family History     Problem Relation (Age of Onset)    Cancer Father    Diabetes Brother        Social History     Socioeconomic History    Marital status:      Spouse name: None    Number of children: None    Years of education: None    Highest education level: None   Social Needs    Financial resource strain: None    Food insecurity - worry: None    Food insecurity - inability: None    Transportation needs - medical: None    Transportation needs - non-medical: None   Occupational History    None   Tobacco Use    Smoking status: Never Smoker   Substance and Sexual Activity    Alcohol use: No     Frequency: Never    Drug use: No    Sexual activity: No     Partners: Male   Other Topics Concern    None   Social History Narrative    None     Review of Systems   Constitutional: Positive for fatigue and fever.   Respiratory: Positive for shortness of breath. Negative for cough.    Gastrointestinal: Positive for diarrhea.   Genitourinary: Negative for dysuria.   Skin: Negative.      Objective:     Vital Signs (Most Recent):  Temp: 98.4 °F (36.9 °C) (09/12/18 1501)  Pulse: 88 (09/12/18 1630)  Resp: (!) 29 (09/12/18 1630)  BP: 137/73 (09/12/18 1600)  SpO2: 96 % (09/12/18 1630) Vital Signs (24h Range):  Temp:  [97.5 °F (36.4 °C)-99 °F (37.2 °C)] 98.4 °F (36.9 °C)  Pulse:  [75-90] 88  Resp:  [17-47] 29  SpO2:  [88 %-100 %] 96 %  BP: ()/() 137/73     Weight: 82.8 kg (182 lb 8.7 oz)  Body mass index is 34.49 kg/m².    Estimated Creatinine Clearance: 70.2  mL/min (based on SCr of 0.8 mg/dL).    Physical Exam   Constitutional: She is oriented to person, place, and time.   Has nieves and left IJ   Cardiovascular: Normal rate and regular rhythm.   No murmur heard.  Pulmonary/Chest: Breath sounds normal. No stridor. She is in respiratory distress. She has no wheezes. She has no rales.   Normal sats on nasal canula   Abdominal: Soft. Bowel sounds are normal. There is tenderness.   Dressing dry over incision site RLQ, vague tenderness over abdomen, not peritonitic though   Musculoskeletal: She exhibits no edema.   Neurostimulator site no tenderness, no paraspinal tenderness   Neurological: She is alert and oriented to person, place, and time.   Skin: Skin is warm and dry. No rash noted.   No signs of infection over port site   Psychiatric: She has a normal mood and affect. Her behavior is normal. Thought content normal.   Vitals reviewed.      Significant Labs:   BMP:   Recent Labs   Lab  09/12/18   0353   GLU  287*   NA  132*   K  3.8   CL  97   CO2  28   BUN  5*   CREATININE  0.8   CALCIUM  7.8*   MG  1.8     CBC:   Recent Labs   Lab  09/11/18   0601  09/11/18   1422  09/12/18   0353   WBC  11.11  8.12  8.58   HGB  9.7*  8.1*  7.4*   HCT  32.7*  27.1*  24.8*   PLT  446*  345  333     CMP:   Recent Labs   Lab  09/11/18   0601  09/11/18   1422  09/12/18   0353   NA  131*  130*  132*   K  3.9  3.7  3.8   CL  95  95  97   CO2  25  28  28   GLU  221*  334*  287*   BUN  6*  6*  5*   CREATININE  0.8  0.9  0.8   CALCIUM  8.9  8.2*  7.8*   PROT  5.8*  4.8*  4.9*   ALBUMIN  2.2*  1.9*  1.9*   BILITOT  0.5  0.4  0.3   ALKPHOS  97  83  85   AST  15  11  11   ALT  10  8*  9*   ANIONGAP  11  7*  7*   EGFRNONAA  >60  >60  >60     CDiff negative  CT abd ? Fluid collection    Significant Imaging: I have reviewed all pertinent imaging results/findings within the past 24 hours.     CXR normal x 2  CT chest normal

## 2018-09-12 NOTE — NURSING
Report called to receiving staff in icu    Pt to have cta  Immediately prior to going to icu   Pt now on oxygen at 2 liters  Charge nurse and staff member accompanied pt on monitor to ct scan with plan to go directly from icu from there   Dr guthrie left message with daughter regarding pt

## 2018-09-12 NOTE — PT/OT/SLP PROGRESS
Occupational Therapy  Visit Attempt    Patient Name:  Sheryl Martines   MRN:  [id number]    Patient not seen this PM 2/2 currently on bed pan; reporting abdominal pain and cramps and requesting therapist return later in attempts to have BM. Pt repositioned for comfort  . Will follow-up as available.    Vianey Bang OT  9/12/2018

## 2018-09-12 NOTE — ASSESSMENT & PLAN NOTE
Ms Martines returned to hospital after a recent admission for appendicitis and CDiff. The symptoms prompting her return include malaise, fever and drainage from appendectomy site. After admission she was also noted to have SOB, hypoxia and hypotension. The CDiff cultures were negative and she is still on oral vanc. Her hypotension and fever are concerning for sepsis but the source is undetermined, with likely etiologies being the abdomen and pneumonia (Hx of hypoxia and SOB) although her chest imaging has not been impressive. Surgery assessment has been very valuable in this patient with Hx of abd pain, incision site drainage and imaging showing possible fluid collection.     Recs  - agree with broad spectrum antibiotics in this ill looking patient with recent deterioration after admission  - will follow up blood cultures, urine cultures  - agree that her indwelling hardware such as the port may be a source of infection, but as yet, no bacteremia has been identified, would defer removal for now  - complete course of oral vanc for CDiff

## 2018-09-12 NOTE — NURSING
Pt blood pressure repeated  Lower now than previous check  Pt resp effort has increased  Using more accessory muscles   Pt wants to know what is wrong with her   Family practice made recent rounds   Updated staff on current state   Dr guthrie coming to see pt   Family member also here to talk to md

## 2018-09-12 NOTE — NURSING
RN called resident on call to clarify if the team wanted to give all three antihypertensive medications since the pt was admitted to the ICU with hypotension. Pt SBP has been ranging from 110-130. MD stated to hold the medications for now and the team would be around soon to round and would make the decision then whether or not to give the medications.

## 2018-09-12 NOTE — PLAN OF CARE
TN left message for daughter to complete readmission assessment.     Future Appointments   Date Time Provider Department Center   9/13/2018  2:20 PM Bernadine Melendrez MD [facility]        09/12/18 2446   Readmission Questionnaire   At the time of your discharge, did someone talk to you about what your health problems were? Yes     Vicky Tran RN  Transition Navigator  [phone]

## 2018-09-12 NOTE — ANESTHESIA PROCEDURE NOTES
Arterial    Diagnosis: sersis    Patient location during procedure: ICU  Procedure start time: 9/11/2018 9:15 PM and 9/11/2018 9:12 PM  Timeout: 9/11/2018 9:12 PM  Procedure end time: 9/11/2018 9:31 PM  Staffing  Anesthesiologist: Sotero Chaney MD  Performed: anesthesiologist   Anesthesiologist was present at the time of the procedure.  Preanesthetic Checklist  Completed: patient identified, site marked, surgical consent, pre-op evaluation, timeout performed, IV checked, risks and benefits discussed, monitors and equipment checked and anesthesia consent givenArterial  Skin Prep: chlorhexidine gluconate  Local Infiltration: lidocaine  Orientation: left  Location: radial  Catheter Size: 20 G  Catheter placement by Anatomical landmarks. Heme positive aspiration all ports.Insertion Attempts: 1  Assessment  Dressing: secured with tape and tegaderm  Patient: Tolerated well

## 2018-09-12 NOTE — NURSING
Dr guthrie here   Daughter has left but plans to return  Dr guthrie examined pt   Bolus ordered    Pt to be transferred to icu

## 2018-09-12 NOTE — PT/OT/SLP PROGRESS
Physical Therapy      Patient Name:  Sheryl Martines   MRN:  [id number]    Patient with nursing care on bedpan attempting BM at present. Will follow up as able    Renato Ayoub, PT

## 2018-09-12 NOTE — ANESTHESIA PROCEDURE NOTES
Central Line    Diagnosis: sepsis  Patient location during procedure: ICU  Procedure start time: 9/11/2018 7:44 PM  Timeout: 9/11/2018 7:45 PM  Procedure end time: 9/11/2018 8:04 PM  Staffing  Anesthesiologist: Sotero Chaney MD  Performed: anesthesiologist   Anesthesiologist was present at the time of the procedure.  Preanesthetic Checklist  Completed: patient identified, site marked, surgical consent, pre-op evaluation, timeout performed, IV checked, risks and benefits discussed, monitors and equipment checked and anesthesia consent given  Indication  Indication: hemodynamic monitoring, vascular access, med administration     Anesthesia   local infiltration    Central Line  Skin Prep: skin prepped with ChloraPrep, skin prep agent completely dried prior to procedure  maximum sterile barriers used during central venous catheter insertion  hand hygiene performed prior to central venous catheter insertion  Location: left internal jugular,   Catheter type: triple lumen  Catheter Size: 7 Fr  Inserted Catheter Length: 20 cm  Ultrasound: vascular probe with ultrasound  Vessel Caliber: large, patent  Vascular Doppler:  not done, compressibility normal  Needle advanced into vessel with real time Ultrasound guidance.  Guidewire confirmed in vessel.  Sterile sheath used.  Image recorded and saved.  Manometry: none  Insertion Attempts: 1   Securement:line sutured, chlorhexidine patch, sterile dressing applied and blood return through all ports     Post-Procedure  X-Ray: no pneumothorax on x-ray, placement verified by x-ray, tip termination and successful placement  Tip termination comments: SVC & IVC junction   Adverse Events:none

## 2018-09-12 NOTE — PROGRESS NOTES
"surgery follow up  BP (!) 81/56   Pulse 85   Temp 97.5 °F (36.4 °C) (Axillary)   Resp 17   Ht 5' 1" (1.549 m)   Wt 82.1 kg (181 lb)   LMP  (LMP Unknown)   SpO2 98%   Breastfeeding? No   BMI 34.20 kg/m²    Recent Results (from the past 336 hour(s))   CBC auto differential    Collection Time: 09/11/18  2:22 PM   Result Value Ref Range    WBC 8.12 3.90 - 12.70 K/uL    Hemoglobin 8.1 (L) 12.0 - 16.0 g/dL    Hematocrit 27.1 (L) 37.0 - 48.5 %    Platelets 345 150 - 350 K/uL   CBC with Automated Differential    Collection Time: 09/11/18  6:01 AM   Result Value Ref Range    WBC 11.11 3.90 - 12.70 K/uL    Hemoglobin 9.7 (L) 12.0 - 16.0 g/dL    Hematocrit 32.7 (L) 37.0 - 48.5 %    Platelets 446 (H) 150 - 350 K/uL   CBC auto differential    Collection Time: 09/10/18  7:18 PM   Result Value Ref Range    WBC 11.34 3.90 - 12.70 K/uL    Hemoglobin 9.6 (L) 12.0 - 16.0 g/dL    Hematocrit 32.5 (L) 37.0 - 48.5 %    Platelets 464 (H) 150 - 350 K/uL     hgb stable , wbc normal   Awake , denies any abdominal pain , has had bowel movement , stool sent for culture c.deficile    Continue supportive ytreatment  "

## 2018-09-12 NOTE — HPI
Sheryl Martines  is a 63 y.o. 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    She had a recent hospital admission 8/22 after a fall during which time she was noted to have appendicits on spine imaging and who underwent open appendectomy surgery on  08/26 with Dr. Melendrez. Subsequently her wound cultures grew EColi, Enterococcus, and MRSA and she was treated with cipro, flagyl and vancomycin. Also, CDiff returned positive on 8/29 and she was treated with oral vanc and metronidazole and discharged on this regimen with a tentative end date of 9/13.     Regarding this admission, she presents to the emergency department on 9/10 with increased drainage from the appendectomy incision site for 2 days and fever for 1 day (100.4). She also mentions that she actually came in to the ER, not for the drainage but instead for malaise. Did also have nausea and diarrhoea.    The day after admission, she was noted to be in some respiratory distress, have a low sat, and low BP and was transferred to the ICU. She never required pressors but was escalated to broad spec antibiotics.     At the bedside she also endorses chronic low back pain, abd pain and SOB, but no dysuria.

## 2018-09-12 NOTE — PLAN OF CARE
TN and ION Monica went to meet with patient, no family at bedside. TN reviewed patient's clinicals. She transferred from the 5th floor to ICU. Prior to transfer, therapy was recommending SNF for patient. Patient reports she had been to one in [place] before. She may be agreeable this time (had refused previously). TN also informed patient will see what her needs are closer to discharge, but will leave a SNF list at bedside just in case.     TN called patient's daughter, however, patient said she may be at work. TN left message for patient's daughter to provide her number and update.     Future Appointments   Date Time Provider Department Center   9/13/2018  2:20 PM Bernadine Melendrez MD [facility]        09/12/18 9996   Discharge Reassessment   Assessment Type Discharge Planning Reassessment   Provided patient/caregiver education on the expected discharge date and the discharge plan Yes   Discharge Plan A Skilled Nursing Facility   Discharge Plan B Home with family;Home Health     Vicky Tran RN  Transition Navigator  [phone]

## 2018-09-12 NOTE — SUBJECTIVE & OBJECTIVE
Past Medical History:   Diagnosis Date    Asthma     Closed compression fracture of fourth lumbar vertebra     COPD (chronic obstructive pulmonary disease)     Coronary artery disease     Diabetes mellitus     Glaucoma     High cholesterol     Hypertension     Iritis     Pulmonary embolus     Stroke     rt sided weakness.       Past Surgical History:   Procedure Laterality Date    ABDOMINAL SURGERY      APPENDECTOMY N/A 8/26/2018    Procedure: APPENDECTOMY;  Surgeon: Bernadine Melendrez MD;  Location: [facility] OR;  Service: General;  Laterality: N/A;    APPENDECTOMY N/A 8/26/2018    Performed by Bernadine Melendrez MD at [facility] OR    APPENDECTOMY, LAPAROSCOPIC---CONVERTED TO OPEN APPENDECTOMY @0950 N/A 8/26/2018    Performed by Bernadine Melendrez MD at [facility] OR    BACK SURGERY      stimulator    CATARACT EXTRACTION      HYSTERECTOMY      LAPAROSCOPIC APPENDECTOMY N/A 8/26/2018    Procedure: APPENDECTOMY, LAPAROSCOPIC---CONVERTED TO OPEN APPENDECTOMY @0950;  Surgeon: Bernadine Melendrez MD;  Location: [facility] OR;  Service: General;  Laterality: N/A;       Review of patient's allergies indicates:   Allergen Reactions    Ace inhibitors Swelling    Corticosteroids (glucocorticoids)     Hydralazine analogues     Tetracyclines Swelling    Travatan (with benzalkonium) [travoprost (benzalkonium)]        Medications:  Medications Prior to Admission   Medication Sig    aspirin (ECOTRIN) 81 MG EC tablet Take 1 tablet (81 mg total) by mouth once daily.    clopidogrel (PLAVIX) 75 mg tablet Take 75 mg by mouth once daily.    diltiaZEM (CARDIZEM CD) 180 MG 24 hr capsule Take 180 mg by mouth once daily.    DULoxetine (CYMBALTA) 60 MG capsule Take 60 mg by mouth once daily.    fluticasone-salmeterol 500-50 mcg/dose (ADVAIR DISKUS) 500-50 mcg/dose DsDv diskus inhaler Inhale 1 puff into the lungs 2 (two) times daily. Controller    furosemide (LASIX) 40 MG tablet Take 40 mg by mouth once daily.     gabapentin  (NEURONTIN) 300 MG capsule Take 300 mg by mouth 3 (three) times daily.    lansoprazole (PREVACID) 30 MG capsule Take 30 mg by mouth once daily.    losartan potassium (COZAAR ORAL) Take 100 mg by mouth.     metroNIDAZOLE (FLAGYL) 500 MG tablet Take 1 tablet (500 mg total) by mouth every 8 (eight) hours. for 9 days    ondansetron (ZOFRAN) 4 MG tablet Take 1 tablet (4 mg total) by mouth every 6 (six) hours as needed.    prazosin (MINIPRESS) 5 MG capsule Take 5 mg by mouth 2 (two) times daily.     predniSONE (DELTASONE) 10 MG tablet Take 10 mg by mouth 2 (two) times daily.     pyridoxine, vitamin B6, (VITAMIN B-6) 50 MG Tab Take 50 mg by mouth once daily.    simvastatin (ZOCOR) 40 MG tablet Take 40 mg by mouth every evening.    sitagliptin phosphate (JANUVIA ORAL) Take by mouth.    theophylline (THEODUR) 200 MG 12 hr tablet Take 200 mg by mouth once daily.     theophylline anhydrous (UNIPHYL ORAL) Take by mouth.    albuterol (ACCUNEB) 0.63 mg/3 mL Nebu Take 0.83 mg by nebulization every 6 (six) hours as needed. Rescue     baclofen (LIORESAL) 10 MG tablet Take 10 mg by mouth 3 (three) times daily.    clindamycin (CLEOCIN) 300 MG capsule Take 1 capsule (300 mg total) by mouth 2 (two) times daily. for 7 days    diclofenac sodium 1 % Gel Apply topically once daily. for 10 days as directed    insulin detemir U-100 (LEVEMIR) 100 unit/mL injection Inject 20 Units into the skin 2 (two) times daily.     insulin lispro (HUMALOG PEN SUBQ) Inject into the skin.    traMADol (ULTRAM-ER) 100 MG Tb24 Take 50 mg by mouth daily as needed.    vancomycin 25 mg/mL solution Take 20 mLs (500 mg total) by mouth every 6 (six) hours. for 9 days     Antibiotics (From admission, onward)    Start     Stop Route Frequency Ordered    09/12/18 0530  vancomycin in dextrose 5 % 1 gram/250 mL IVPB 1,000 mg  (Vancomycin IVPB with levels panel)      -- IV Every 12 hours (non-standard times) 09/11/18 1607    09/11/18 9958   piperacillin-tazobactam 4.5 g in dextrose 5 % 100 mL IVPB (ready to mix system)      -- IV Every 8 hours (non-standard times) 09/11/18 1607    09/11/18 0130  vancomycin 250mg / 10ml oral suspension 500 mg      -- Oral Every 6 hours 09/11/18 0125        Antifungals (From admission, onward)    None        Antivirals (From admission, onward)    None           Immunization History   Administered Date(s) Administered    PPD Test 08/28/2018       Family History     Problem Relation (Age of Onset)    Cancer Father    Diabetes Brother        Social History     Socioeconomic History    Marital status:      Spouse name: None    Number of children: None    Years of education: None    Highest education level: None   Social Needs    Financial resource strain: None    Food insecurity - worry: None    Food insecurity - inability: None    Transportation needs - medical: None    Transportation needs - non-medical: None   Occupational History    None   Tobacco Use    Smoking status: Never Smoker   Substance and Sexual Activity    Alcohol use: No     Frequency: Never    Drug use: No    Sexual activity: No     Partners: Male   Other Topics Concern    None   Social History Narrative    None     Review of Systems   Gastrointestinal: Positive for abdominal distention and abdominal pain.     Objective:     Vital Signs (Most Recent):  Temp: 98.4 °F (36.9 °C) (09/12/18 1501)  Pulse: 90 (09/12/18 1800)  Resp: (!) 35 (09/12/18 1800)  BP: 136/70 (09/12/18 1800)  SpO2: 100 % (09/12/18 1800) Vital Signs (24h Range):  Temp:  [97.5 °F (36.4 °C)-99 °F (37.2 °C)] 98.4 °F (36.9 °C)  Pulse:  [75-90] 90  Resp:  [17-47] 35  SpO2:  [88 %-100 %] 100 %  BP: ()/() 136/70     Weight: 82.8 kg (182 lb 8.7 oz)  Body mass index is 34.49 kg/m².    Estimated Creatinine Clearance: 70.2 mL/min (based on SCr of 0.8 mg/dL).    Physical Exam   Constitutional: She is oriented to person, place, and time. She appears distressed.   Eyes:  EOM are normal.   Neck: No JVD present.   Cardiovascular: Normal rate and normal heart sounds.   No murmur heard.  Pulmonary/Chest: Breath sounds normal.   Mild - mod respiratory distress, on nasal cannula normal sats   Abdominal: Soft. Bowel sounds are normal. She exhibits distension. There is tenderness. There is no rebound and no guarding.   Musculoskeletal: She exhibits no edema.   No paraspinal tenderness   Neurological: She is alert and oriented to person, place, and time.   Skin: Skin is warm and dry. No rash noted. She is not diaphoretic.   No tenderness at port site or at site of neurostimulator implant. Has left IJ and nieves   Psychiatric: She has a normal mood and affect.   Vitals reviewed.      Significant Labs:   BMP:   Recent Labs   Lab  09/12/18   0353   GLU  287*   NA  132*   K  3.8   CL  97   CO2  28   BUN  5*   CREATININE  0.8   CALCIUM  7.8*   MG  1.8     CBC:   Recent Labs   Lab  09/11/18   0601  09/11/18   1422  09/12/18   0353   WBC  11.11  8.12  8.58   HGB  9.7*  8.1*  7.4*   HCT  32.7*  27.1*  24.8*   PLT  446*  345  333     CMP:   Recent Labs   Lab  09/11/18   0601  09/11/18   1422  09/12/18   0353   NA  131*  130*  132*   K  3.9  3.7  3.8   CL  95  95  97   CO2  25  28  28   GLU  221*  334*  287*   BUN  6*  6*  5*   CREATININE  0.8  0.9  0.8   CALCIUM  8.9  8.2*  7.8*   PROT  5.8*  4.8*  4.9*   ALBUMIN  2.2*  1.9*  1.9*   BILITOT  0.5  0.4  0.3   ALKPHOS  97  83  85   AST  15  11  11   ALT  10  8*  9*   ANIONGAP  11  7*  7*   EGFRNONAA  >60  >60  >60       Significant Imaging: I have reviewed all pertinent imaging results/findings within the past 24 hours.     CXR and CT chest not significantly abnormal  CT abdomen, possible fluid collection  Cdiff negative

## 2018-09-12 NOTE — CONSULTS
[facility]  Infectious Disease  Consult Note    Patient Name: Sheryl Martines  MRN: [id number]  Admission Date: 9/10/2018  Hospital Length of Stay: 1 days  Attending Physician: Cassy Wick MD  Primary Care Provider: Primary Doctor No     Isolation Status: No active isolations    Patient information was obtained from patient, past medical records and ER records.      Inpatient consult to Infectious Diseases  Consult performed by: Virginia Gutierrez MD  Consult ordered by: Bettye Gallagher MD  Reason for consult: Suspected sepsis        Assessment/Plan:     Sepsis due to other etiology    Ms Martines returned to hospital after a recent admission for appendicitis and CDiff. The symptoms prompting her return include malaise, fever and drainage from appendectomy site. After admission she was also noted to have SOB, hypoxia and hypotension. The CDiff cultures were negative and she is still on oral vanc. Her hypotension and fever are concerning for sepsis but the source is undetermined, with likely etiologies being the abdomen and pneumonia (Hx of hypoxia and SOB) although her chest imaging has not been impressive. Surgery assessment has been very valuable in this patient with Hx of abd pain, incision site drainage and imaging showing possible fluid collection.     Recs  - agree with broad spectrum antibiotics in this ill looking patient with recent deterioration after admission  - will follow up blood cultures, urine cultures  - agree that her indwelling hardware such as the port may be a source of infection, but as yet, no bacteremia has been identified, would defer removal for now  - complete course of oral vanc for CDiff              Thank you for your consult. I will follow-up with patient. Please contact us if you have any additional questions.    Virginia Gutierrez MD  Infectious Disease  [facility]    Subjective:     Principal Problem: Decreased oral intake    HPI: Sheryl  Conrad  is a 63 y.o. 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    She had a recent hospital admission 8/22 after a fall during which time she was noted to have appendicits on spine imaging and who underwent open appendectomy surgery on  08/26 with Dr. Melendrez. Subsequently her wound cultures grew EColi, Enterococcus, and MRSA and she was treated with cipro, flagyl and vancomycin. Also, CDiff returned positive on 8/29 and she was treated with oral vanc and metronidazole and discharged on this regimen with a tentative end date of 9/13.     Regarding this admission, she presents to the emergency department on 9/10 with increased drainage from the appendectomy incision site for 2 days and fever for 1 day (100.4). She also mentions that she actually came in to the ER, not for the drainage but instead for malaise. Did also have nausea and diarrhoea.    The day after admission, she was noted to be in some respiratory distress, have a low sat, and low BP and was transferred to the ICU. She never required pressors but was escalated to broad spec antibiotics.     At the bedside she also endorses chronic low back pain, abd pain and SOB, but no dysuria.              Past Medical History:   Diagnosis Date    Asthma     Closed compression fracture of fourth lumbar vertebra     COPD (chronic obstructive pulmonary disease)     Coronary artery disease     Diabetes mellitus     Glaucoma     High cholesterol     Hypertension     Iritis     Pulmonary embolus     Stroke     rt sided weakness.       Past Surgical History:   Procedure Laterality Date    ABDOMINAL SURGERY      APPENDECTOMY N/A 8/26/2018    Procedure: APPENDECTOMY;  Surgeon: Bernadine Melendrez MD;  Location: [facility] OR;  Service: General;  Laterality: N/A;    APPENDECTOMY N/A 8/26/2018    Performed by [facility]adine Melendrez MD at [facility] OR    APPENDECTOMY, LAPAROSCOPIC---CONVERTED TO OPEN APPENDECTOMY @1899  N/A 8/26/2018    Performed by [facility]adine [facility]rez MD at [facility] OR    BACK SURGERY      stimulator    CATARACT EXTRACTION      HYSTERECTOMY      LAPAROSCOPIC APPENDECTOMY N/A 8/26/2018    Procedure: APPENDECTOMY, LAPAROSCOPIC---CONVERTED TO OPEN APPENDECTOMY @0950;  Surgeon: Bernadine Melendrez MD;  Location: [facility] OR;  Service: General;  Laterality: N/A;       Review of patient's allergies indicates:   Allergen Reactions    Ace inhibitors Swelling    Corticosteroids (glucocorticoids)     Hydralazine analogues     Tetracyclines Swelling    Travatan (with benzalkonium) [travoprost (benzalkonium)]        Medications:  Medications Prior to Admission   Medication Sig    aspirin (ECOTRIN) 81 MG EC tablet Take 1 tablet (81 mg total) by mouth once daily.    clopidogrel (PLAVIX) 75 mg tablet Take 75 mg by mouth once daily.    diltiaZEM (CARDIZEM CD) 180 MG 24 hr capsule Take 180 mg by mouth once daily.    DULoxetine (CYMBALTA) 60 MG capsule Take 60 mg by mouth once daily.    fluticasone-salmeterol 500-50 mcg/dose (ADVAIR DISKUS) 500-50 mcg/dose DsDv diskus inhaler Inhale 1 puff into the lungs 2 (two) times daily. Controller    furosemide (LASIX) 40 MG tablet Take 40 mg by mouth once daily.     gabapentin (NEURONTIN) 300 MG capsule Take 300 mg by mouth 3 (three) times daily.    lansoprazole (PREVACID) 30 MG capsule Take 30 mg by mouth once daily.    losartan potassium (COZAAR ORAL) Take 100 mg by mouth.     metroNIDAZOLE (FLAGYL) 500 MG tablet Take 1 tablet (500 mg total) by mouth every 8 (eight) hours. for 9 days    ondansetron (ZOFRAN) 4 MG tablet Take 1 tablet (4 mg total) by mouth every 6 (six) hours as needed.    prazosin (MINIPRESS) 5 MG capsule Take 5 mg by mouth 2 (two) times daily.     predniSONE (DELTASONE) 10 MG tablet Take 10 mg by mouth 2 (two) times daily.     pyridoxine, vitamin B6, (VITAMIN B-6) 50 MG Tab Take 50 mg by mouth once daily.    simvastatin (ZOCOR) 40 MG tablet Take 40 mg by  mouth every evening.    sitagliptin phosphate (JANUVIA ORAL) Take by mouth.    theophylline (THEODUR) 200 MG 12 hr tablet Take 200 mg by mouth once daily.     theophylline anhydrous (UNIPHYL ORAL) Take by mouth.    albuterol (ACCUNEB) 0.63 mg/3 mL Nebu Take 0.83 mg by nebulization every 6 (six) hours as needed. Rescue     baclofen (LIORESAL) 10 MG tablet Take 10 mg by mouth 3 (three) times daily.    clindamycin (CLEOCIN) 300 MG capsule Take 1 capsule (300 mg total) by mouth 2 (two) times daily. for 7 days    diclofenac sodium 1 % Gel Apply topically once daily. for 10 days as directed    insulin detemir U-100 (LEVEMIR) 100 unit/mL injection Inject 20 Units into the skin 2 (two) times daily.     insulin lispro (HUMALOG PEN SUBQ) Inject into the skin.    traMADol (ULTRAM-ER) 100 MG Tb24 Take 50 mg by mouth daily as needed.    vancomycin 25 mg/mL solution Take 20 mLs (500 mg total) by mouth every 6 (six) hours. for 9 days     Antibiotics (From admission, onward)    Start     Stop Route Frequency Ordered    09/12/18 0530  vancomycin in dextrose 5 % 1 gram/250 mL IVPB 1,000 mg  (Vancomycin IVPB with levels panel)      -- IV Every 12 hours (non-standard times) 09/11/18 1607    09/11/18 1715  piperacillin-tazobactam 4.5 g in dextrose 5 % 100 mL IVPB (ready to mix system)      -- IV Every 8 hours (non-standard times) 09/11/18 1607    09/11/18 0130  vancomycin 250mg / 10ml oral suspension 500 mg      -- Oral Every 6 hours 09/11/18 0125        Antifungals (From admission, onward)    None        Antivirals (From admission, onward)    None           Immunization History   Administered Date(s) Administered    PPD Test 08/28/2018       Family History     Problem Relation (Age of Onset)    Cancer Father    Diabetes Brother        Social History     Socioeconomic History    Marital status:      Spouse name: None    Number of children: None    Years of education: None    Highest education level: None   Social  Needs    Financial resource strain: None    Food insecurity - worry: None    Food insecurity - inability: None    Transportation needs - medical: None    Transportation needs - non-medical: None   Occupational History    None   Tobacco Use    Smoking status: Never Smoker   Substance and Sexual Activity    Alcohol use: No     Frequency: Never    Drug use: No    Sexual activity: No     Partners: Male   Other Topics Concern    None   Social History Narrative    None     Review of Systems   Constitutional: Positive for fatigue and fever.   Respiratory: Positive for shortness of breath. Negative for cough.    Gastrointestinal: Positive for diarrhea.   Genitourinary: Negative for dysuria.   Skin: Negative.      Objective:     Vital Signs (Most Recent):  Temp: 98.4 °F (36.9 °C) (09/12/18 1501)  Pulse: 88 (09/12/18 1630)  Resp: (!) 29 (09/12/18 1630)  BP: 137/73 (09/12/18 1600)  SpO2: 96 % (09/12/18 1630) Vital Signs (24h Range):  Temp:  [97.5 °F (36.4 °C)-99 °F (37.2 °C)] 98.4 °F (36.9 °C)  Pulse:  [75-90] 88  Resp:  [17-47] 29  SpO2:  [88 %-100 %] 96 %  BP: ()/() 137/73     Weight: 82.8 kg (182 lb 8.7 oz)  Body mass index is 34.49 kg/m².    Estimated Creatinine Clearance: 70.2 mL/min (based on SCr of 0.8 mg/dL).    Physical Exam   Constitutional: She is oriented to person, place, and time.   Has nieves and left IJ   Cardiovascular: Normal rate and regular rhythm.   No murmur heard.  Pulmonary/Chest: Breath sounds normal. No stridor. She is in respiratory distress. She has no wheezes. She has no rales.   Normal sats on nasal canula   Abdominal: Soft. Bowel sounds are normal. There is tenderness.   Dressing dry over incision site RLQ, vague tenderness over abdomen, not peritonitic though   Musculoskeletal: She exhibits no edema.   Neurostimulator site no tenderness, no paraspinal tenderness   Neurological: She is alert and oriented to person, place, and time.   Skin: Skin is warm and dry. No rash  noted.   No signs of infection over port site   Psychiatric: She has a normal mood and affect. Her behavior is normal. Thought content normal.   Vitals reviewed.      Significant Labs:   BMP:   Recent Labs   Lab  09/12/18   0353   GLU  287*   NA  132*   K  3.8   CL  97   CO2  28   BUN  5*   CREATININE  0.8   CALCIUM  7.8*   MG  1.8     CBC:   Recent Labs   Lab  09/11/18   0601  09/11/18   1422  09/12/18   0353   WBC  11.11  8.12  8.58   HGB  9.7*  8.1*  7.4*   HCT  32.7*  27.1*  24.8*   PLT  446*  345  333     CMP:   Recent Labs   Lab  09/11/18   0601  09/11/18   1422  09/12/18   0353   NA  131*  130*  132*   K  3.9  3.7  3.8   CL  95  95  97   CO2  25  28  28   GLU  221*  334*  287*   BUN  6*  6*  5*   CREATININE  0.8  0.9  0.8   CALCIUM  8.9  8.2*  7.8*   PROT  5.8*  4.8*  4.9*   ALBUMIN  2.2*  1.9*  1.9*   BILITOT  0.5  0.4  0.3   ALKPHOS  97  83  85   AST  15  11  11   ALT  10  8*  9*   ANIONGAP  11  7*  7*   EGFRNONAA  >60  >60  >60     CDiff negative  CT abd ? Fluid collection    Significant Imaging: I have reviewed all pertinent imaging results/findings within the past 24 hours.     CXR normal x 2  CT chest normal

## 2018-09-12 NOTE — PROGRESS NOTES
"Surgery follow up  /78 (BP Location: Left arm, Patient Position: Lying)   Pulse 85   Temp 98.1 °F (36.7 °C) (Oral)   Resp (!) 33   Ht 5' 1" (1.549 m)   Wt 82.8 kg (182 lb 8.7 oz)   LMP  (LMP Unknown)   SpO2 97%   Breastfeeding? No   BMI 34.49 kg/m²   I/O last 3 completed shifts:  In: 2869.2 [P.O.:20; I.V.:2399.2; IV Piggyback:450]  Out: 619 [Urine:619]  I/O this shift:  In: 518.8 [P.O.:200; I.V.:218.8; IV Piggyback:100]  Out: 491 [Urine:491]  Recent Results (from the past 336 hour(s))   CBC with Automated Differential    Collection Time: 09/12/18  3:53 AM   Result Value Ref Range    WBC 8.58 3.90 - 12.70 K/uL    Hemoglobin 7.4 (L) 12.0 - 16.0 g/dL    Hematocrit 24.8 (L) 37.0 - 48.5 %    Platelets 333 150 - 350 K/uL   CBC auto differential    Collection Time: 09/11/18  2:22 PM   Result Value Ref Range    WBC 8.12 3.90 - 12.70 K/uL    Hemoglobin 8.1 (L) 12.0 - 16.0 g/dL    Hematocrit 27.1 (L) 37.0 - 48.5 %    Platelets 345 150 - 350 K/uL   CBC with Automated Differential    Collection Time: 09/11/18  6:01 AM   Result Value Ref Range    WBC 11.11 3.90 - 12.70 K/uL    Hemoglobin 9.7 (L) 12.0 - 16.0 g/dL    Hematocrit 32.7 (L) 37.0 - 48.5 %    Platelets 446 (H) 150 - 350 K/uL   abdomen soft , wound ok, culture staph , wound better , continue present treatment.  "

## 2018-09-13 PROBLEM — A04.72 INTESTINAL INFECTION DUE TO CLOSTRIDIUM DIFFICILE: Status: ACTIVE | Noted: 2018-09-13

## 2018-09-13 LAB
ABO + RH BLD: NORMAL
ALBUMIN SERPL BCP-MCNC: 2.1 G/DL
ALP SERPL-CCNC: 91 U/L
ALT SERPL W/O P-5'-P-CCNC: 9 U/L
ANION GAP SERPL CALC-SCNC: 7 MMOL/L
AST SERPL-CCNC: 15 U/L
BACTERIA UR CULT: NO GROWTH
BASOPHILS # BLD AUTO: 0 K/UL
BASOPHILS # BLD AUTO: 0.01 K/UL
BASOPHILS NFR BLD: 0 %
BASOPHILS NFR BLD: 0.1 %
BILIRUB SERPL-MCNC: 0.4 MG/DL
BLD GP AB SCN CELLS X3 SERPL QL: NORMAL
BLD PROD TYP BPU: NORMAL
BLOOD UNIT EXPIRATION DATE: NORMAL
BLOOD UNIT TYPE CODE: 600
BLOOD UNIT TYPE: NORMAL
BUN SERPL-MCNC: 4 MG/DL
CALCIUM SERPL-MCNC: 8.2 MG/DL
CHLORIDE SERPL-SCNC: 96 MMOL/L
CO2 SERPL-SCNC: 29 MMOL/L
CODING SYSTEM: NORMAL
CREAT SERPL-MCNC: 0.9 MG/DL
DELSYS: ABNORMAL
DIFFERENTIAL METHOD: ABNORMAL
DIFFERENTIAL METHOD: ABNORMAL
DISPENSE STATUS: NORMAL
EOSINOPHIL # BLD AUTO: 0 K/UL
EOSINOPHIL # BLD AUTO: 0 K/UL
EOSINOPHIL NFR BLD: 0.1 %
EOSINOPHIL NFR BLD: 0.1 %
ERYTHROCYTE [DISTWIDTH] IN BLOOD BY AUTOMATED COUNT: 17.9 %
ERYTHROCYTE [DISTWIDTH] IN BLOOD BY AUTOMATED COUNT: 18.6 %
EST. GFR  (AFRICAN AMERICAN): >60 ML/MIN/1.73 M^2
EST. GFR  (NON AFRICAN AMERICAN): >60 ML/MIN/1.73 M^2
FERRITIN SERPL-MCNC: 214 NG/ML
GLUCOSE SERPL-MCNC: 191 MG/DL
HCO3 UR-SCNC: 36.7 MMOL/L (ref 24–28)
HCT VFR BLD AUTO: 22.9 %
HCT VFR BLD AUTO: 30.8 %
HGB BLD-MCNC: 7 G/DL
HGB BLD-MCNC: 9.6 G/DL
IRON SERPL-MCNC: 22 UG/DL
LYMPHOCYTES # BLD AUTO: 0.6 K/UL
LYMPHOCYTES # BLD AUTO: 1.4 K/UL
LYMPHOCYTES NFR BLD: 8.1 %
LYMPHOCYTES NFR BLD: 9.7 %
MAGNESIUM SERPL-MCNC: 1.5 MG/DL
MCH RBC QN AUTO: 24.9 PG
MCH RBC QN AUTO: 26.1 PG
MCHC RBC AUTO-ENTMCNC: 30.6 G/DL
MCHC RBC AUTO-ENTMCNC: 31.2 G/DL
MCV RBC AUTO: 82 FL
MCV RBC AUTO: 84 FL
MONOCYTES # BLD AUTO: 0.6 K/UL
MONOCYTES # BLD AUTO: 0.9 K/UL
MONOCYTES NFR BLD: 6.6 %
MONOCYTES NFR BLD: 7.7 %
NEUTROPHILS # BLD AUTO: 11.9 K/UL
NEUTROPHILS # BLD AUTO: 6.5 K/UL
NEUTROPHILS NFR BLD: 83.3 %
NEUTROPHILS NFR BLD: 84 %
PCO2 BLDA: 55 MMHG (ref 35–45)
PH SMN: 7.43 [PH] (ref 7.35–7.45)
PHOSPHATE SERPL-MCNC: 2.3 MG/DL
PLATELET # BLD AUTO: 251 K/UL
PLATELET # BLD AUTO: 265 K/UL
PMV BLD AUTO: 8.4 FL
PMV BLD AUTO: 8.6 FL
PO2 BLDA: 33 MMHG (ref 40–60)
POC BE: 12 MMOL/L
POC SATURATED O2: 64 % (ref 95–100)
POC TCO2: 38 MMOL/L (ref 24–29)
POCT GLUCOSE: 137 MG/DL (ref 70–110)
POCT GLUCOSE: 152 MG/DL (ref 70–110)
POCT GLUCOSE: 163 MG/DL (ref 70–110)
POCT GLUCOSE: 215 MG/DL (ref 70–110)
POTASSIUM SERPL-SCNC: 3.7 MMOL/L
PREALB SERPL-MCNC: 12 MG/DL
PROT SERPL-MCNC: 5.1 G/DL
RBC # BLD AUTO: 2.81 M/UL
RBC # BLD AUTO: 3.68 M/UL
SAMPLE: ABNORMAL
SATURATED IRON: 16 %
SODIUM SERPL-SCNC: 132 MMOL/L
TOTAL IRON BINDING CAPACITY: 135 UG/DL
TRANS ERYTHROCYTES VOL PATIENT: NORMAL ML
TRANSFERRIN SERPL-MCNC: 91 MG/DL
VANCOMYCIN TROUGH SERPL-MCNC: 20.7 UG/ML
WBC # BLD AUTO: 14.28 K/UL
WBC # BLD AUTO: 7.77 K/UL

## 2018-09-13 PROCEDURE — 94799 UNLISTED PULMONARY SVC/PX: CPT

## 2018-09-13 PROCEDURE — 94640 AIRWAY INHALATION TREATMENT: CPT

## 2018-09-13 PROCEDURE — 83735 ASSAY OF MAGNESIUM: CPT

## 2018-09-13 PROCEDURE — 86901 BLOOD TYPING SEROLOGIC RH(D): CPT

## 2018-09-13 PROCEDURE — P9021 RED BLOOD CELLS UNIT: HCPCS

## 2018-09-13 PROCEDURE — 36415 COLL VENOUS BLD VENIPUNCTURE: CPT

## 2018-09-13 PROCEDURE — 25000242 PHARM REV CODE 250 ALT 637 W/ HCPCS: Performed by: STUDENT IN AN ORGANIZED HEALTH CARE EDUCATION/TRAINING PROGRAM

## 2018-09-13 PROCEDURE — 84100 ASSAY OF PHOSPHORUS: CPT

## 2018-09-13 PROCEDURE — 85025 COMPLETE CBC W/AUTO DIFF WBC: CPT | Mod: 91

## 2018-09-13 PROCEDURE — 84134 ASSAY OF PREALBUMIN: CPT

## 2018-09-13 PROCEDURE — 63600175 PHARM REV CODE 636 W HCPCS: Performed by: FAMILY MEDICINE

## 2018-09-13 PROCEDURE — 20000000 HC ICU ROOM

## 2018-09-13 PROCEDURE — 63600175 PHARM REV CODE 636 W HCPCS: Performed by: STUDENT IN AN ORGANIZED HEALTH CARE EDUCATION/TRAINING PROGRAM

## 2018-09-13 PROCEDURE — 36430 TRANSFUSION BLD/BLD COMPNT: CPT

## 2018-09-13 PROCEDURE — 25000003 PHARM REV CODE 250: Performed by: STUDENT IN AN ORGANIZED HEALTH CARE EDUCATION/TRAINING PROGRAM

## 2018-09-13 PROCEDURE — 86920 COMPATIBILITY TEST SPIN: CPT

## 2018-09-13 PROCEDURE — 82803 BLOOD GASES ANY COMBINATION: CPT

## 2018-09-13 PROCEDURE — 94761 N-INVAS EAR/PLS OXIMETRY MLT: CPT

## 2018-09-13 PROCEDURE — 80202 ASSAY OF VANCOMYCIN: CPT

## 2018-09-13 PROCEDURE — 99900035 HC TECH TIME PER 15 MIN (STAT)

## 2018-09-13 PROCEDURE — 25000003 PHARM REV CODE 250: Performed by: FAMILY MEDICINE

## 2018-09-13 PROCEDURE — 97110 THERAPEUTIC EXERCISES: CPT

## 2018-09-13 PROCEDURE — 83540 ASSAY OF IRON: CPT

## 2018-09-13 PROCEDURE — 82728 ASSAY OF FERRITIN: CPT

## 2018-09-13 PROCEDURE — 80053 COMPREHEN METABOLIC PANEL: CPT

## 2018-09-13 PROCEDURE — 97168 OT RE-EVAL EST PLAN CARE: CPT

## 2018-09-13 RX ORDER — DILTIAZEM HYDROCHLORIDE 30 MG/1
90 TABLET, FILM COATED ORAL DAILY
Status: DISCONTINUED | OUTPATIENT
Start: 2018-09-13 | End: 2018-09-13

## 2018-09-13 RX ORDER — DILTIAZEM HYDROCHLORIDE 30 MG/1
30 TABLET, FILM COATED ORAL EVERY 8 HOURS
Status: DISCONTINUED | OUTPATIENT
Start: 2018-09-13 | End: 2018-09-13

## 2018-09-13 RX ORDER — FUROSEMIDE 10 MG/ML
20 INJECTION INTRAMUSCULAR; INTRAVENOUS
Status: DISCONTINUED | OUTPATIENT
Start: 2018-09-13 | End: 2018-09-20 | Stop reason: HOSPADM

## 2018-09-13 RX ORDER — MEGESTROL ACETATE 40 MG/1
40 TABLET ORAL 2 TIMES DAILY
Status: DISCONTINUED | OUTPATIENT
Start: 2018-09-13 | End: 2018-09-20 | Stop reason: HOSPADM

## 2018-09-13 RX ORDER — LABETALOL HYDROCHLORIDE 5 MG/ML
10 INJECTION, SOLUTION INTRAVENOUS EVERY 6 HOURS PRN
Status: DISCONTINUED | OUTPATIENT
Start: 2018-09-13 | End: 2018-09-13

## 2018-09-13 RX ORDER — HYDROCODONE BITARTRATE AND ACETAMINOPHEN 500; 5 MG/1; MG/1
TABLET ORAL
Status: DISCONTINUED | OUTPATIENT
Start: 2018-09-13 | End: 2018-09-20 | Stop reason: HOSPADM

## 2018-09-13 RX ORDER — MAGNESIUM SULFATE HEPTAHYDRATE 40 MG/ML
2 INJECTION, SOLUTION INTRAVENOUS ONCE
Status: COMPLETED | OUTPATIENT
Start: 2018-09-13 | End: 2018-09-13

## 2018-09-13 RX ORDER — DILTIAZEM HYDROCHLORIDE 120 MG/1
120 CAPSULE, COATED, EXTENDED RELEASE ORAL DAILY
Status: DISCONTINUED | OUTPATIENT
Start: 2018-09-14 | End: 2018-09-14

## 2018-09-13 RX ADMIN — IPRATROPIUM BROMIDE AND ALBUTEROL SULFATE 3 ML: .5; 3 SOLUTION RESPIRATORY (INHALATION) at 08:09

## 2018-09-13 RX ADMIN — Medication 500 MG: at 05:09

## 2018-09-13 RX ADMIN — ALBUTEROL SULFATE 2 PUFF: 90 AEROSOL, METERED RESPIRATORY (INHALATION) at 08:09

## 2018-09-13 RX ADMIN — DILTIAZEM HYDROCHLORIDE 30 MG: 30 TABLET, FILM COATED ORAL at 05:09

## 2018-09-13 RX ADMIN — Medication 500 MG: at 12:09

## 2018-09-13 RX ADMIN — FUROSEMIDE 40 MG: 40 TABLET ORAL at 08:09

## 2018-09-13 RX ADMIN — MEGESTROL ACETATE 40 MG: 40 TABLET ORAL at 09:09

## 2018-09-13 RX ADMIN — IPRATROPIUM BROMIDE AND ALBUTEROL SULFATE 3 ML: .5; 3 SOLUTION RESPIRATORY (INHALATION) at 11:09

## 2018-09-13 RX ADMIN — FUROSEMIDE 20 MG: 10 INJECTION, SOLUTION INTRAMUSCULAR; INTRAVENOUS at 04:09

## 2018-09-13 RX ADMIN — ASPIRIN 81 MG: 81 TABLET, COATED ORAL at 08:09

## 2018-09-13 RX ADMIN — INSULIN ASPART 2 UNITS: 100 INJECTION, SOLUTION INTRAVENOUS; SUBCUTANEOUS at 05:09

## 2018-09-13 RX ADMIN — LACTOBACILLUS TAB 4 TABLET: TAB at 12:09

## 2018-09-13 RX ADMIN — PRAZOSIN HYDROCHLORIDE 5 MG: 1 CAPSULE ORAL at 09:09

## 2018-09-13 RX ADMIN — PIPERACILLIN AND TAZOBACTAM 4.5 G: 4; .5 INJECTION, POWDER, LYOPHILIZED, FOR SOLUTION INTRAVENOUS; PARENTERAL at 12:09

## 2018-09-13 RX ADMIN — THEOPHYLLINE ANHYDROUS 200 MG: 100 CAPSULE, EXTENDED RELEASE ORAL at 10:09

## 2018-09-13 RX ADMIN — ACETAMINOPHEN 650 MG: 325 TABLET, FILM COATED ORAL at 03:09

## 2018-09-13 RX ADMIN — ENOXAPARIN SODIUM 40 MG: 100 INJECTION SUBCUTANEOUS at 04:09

## 2018-09-13 RX ADMIN — IPRATROPIUM BROMIDE AND ALBUTEROL SULFATE 3 ML: .5; 3 SOLUTION RESPIRATORY (INHALATION) at 03:09

## 2018-09-13 RX ADMIN — PIPERACILLIN AND TAZOBACTAM 4.5 G: 4; .5 INJECTION, POWDER, LYOPHILIZED, FOR SOLUTION INTRAVENOUS; PARENTERAL at 04:09

## 2018-09-13 RX ADMIN — PREDNISONE 10 MG: 10 TABLET ORAL at 09:09

## 2018-09-13 RX ADMIN — LACTOBACILLUS TAB 4 TABLET: TAB at 08:09

## 2018-09-13 RX ADMIN — PANTOPRAZOLE SODIUM 40 MG: 40 TABLET, DELAYED RELEASE ORAL at 08:09

## 2018-09-13 RX ADMIN — IPRATROPIUM BROMIDE AND ALBUTEROL SULFATE 3 ML: .5; 3 SOLUTION RESPIRATORY (INHALATION) at 12:09

## 2018-09-13 RX ADMIN — CLOPIDOGREL BISULFATE 75 MG: 75 TABLET ORAL at 08:09

## 2018-09-13 RX ADMIN — ACETAMINOPHEN 650 MG: 325 TABLET, FILM COATED ORAL at 12:09

## 2018-09-13 RX ADMIN — PIPERACILLIN AND TAZOBACTAM 4.5 G: 4; .5 INJECTION, POWDER, LYOPHILIZED, FOR SOLUTION INTRAVENOUS; PARENTERAL at 08:09

## 2018-09-13 RX ADMIN — PRAZOSIN HYDROCHLORIDE 5 MG: 1 CAPSULE ORAL at 10:09

## 2018-09-13 RX ADMIN — LACTOBACILLUS TAB 4 TABLET: TAB at 04:09

## 2018-09-13 RX ADMIN — SIMVASTATIN 40 MG: 20 TABLET, FILM COATED ORAL at 09:09

## 2018-09-13 RX ADMIN — DULOXETINE 60 MG: 30 CAPSULE, DELAYED RELEASE ORAL at 08:09

## 2018-09-13 RX ADMIN — VANCOMYCIN HYDROCHLORIDE 1000 MG: 1 INJECTION, POWDER, LYOPHILIZED, FOR SOLUTION INTRAVENOUS at 05:09

## 2018-09-13 RX ADMIN — MEGESTROL ACETATE 40 MG: 40 TABLET ORAL at 12:09

## 2018-09-13 RX ADMIN — PREDNISONE 10 MG: 10 TABLET ORAL at 08:09

## 2018-09-13 RX ADMIN — ONDANSETRON 4 MG: 2 INJECTION INTRAMUSCULAR; INTRAVENOUS at 12:09

## 2018-09-13 RX ADMIN — MAGNESIUM SULFATE IN WATER 2 G: 40 INJECTION, SOLUTION INTRAVENOUS at 08:09

## 2018-09-13 NOTE — PROGRESS NOTES
Pt encouraged to wear ordered CPAP x2. Pt refused x2. Pt is beginning to become labored and stated she does not want to wear CPAP. Pt requested oxygen. Nurse notified. Will continue to monitor.

## 2018-09-13 NOTE — ASSESSMENT & PLAN NOTE
Ms Martines returned to hospital after a recent admission for appendicitis and CDiff. The symptoms prompting her return include malaise, fever and drainage from appendectomy site. After admission she was also noted to have SOB, hypoxia and hypotension. The CDiff cultures were negative and she is still on oral vanc. Her hypotension and fever are concerning for sepsis but the source is undetermined, with likely etiologies being the abdomen and pneumonia (Hx of hypoxia and SOB) although her chest imaging has not been impressive. MRSA is growing from wound site in the setting of of abd pain, incision site drainage and imaging showing questionable fluid collection but it is difficult to tell if this is clinically important since she is known to be infected with organism from a prior admission     Recs  - would deescalate to IV vanc alone tomorrow, if she remains stable, since MRSA is thus far the only positive culture result   - will follow up blood cultures, urine cultures  - agree that her indwelling hardware such as the port may be a source of infection, but as yet, no bacteremia has been identified, would defer removal for now  - complete course of oral vanc for CDiff

## 2018-09-13 NOTE — PROGRESS NOTES
Assessment performed noted patient appears to be breathing hard with using her accessory muscles. Patient states she have not had a treatment today. [facility] called new orders noted.

## 2018-09-13 NOTE — PT/OT/SLP PROGRESS
Physical Therapy Treatment    Patient Name:  Sheryl Martines   MRN:  [id number]    Recommendations:     Discharge Recommendations:  nursing facility, skilled   Discharge Equipment Recommendations: walker, rolling   Barriers to discharge: None    Assessment:     Sheryl Martines is a 63 y.o. female admitted with a medical diagnosis of Decreased oral intake.  She presents with the following impairments/functional limitations:  weakness, gait instability, decreased upper extremity function, decreased lower extremity function, impaired balance, impaired endurance, pain, impaired functional mobilty, impaired self care skills, edema, impaired skin . Patient with improved functional mobility.    Rehab Prognosis:  good; patient would benefit from acute skilled PT services to address these deficits and reach maximum level of function.      Recent Surgery: * No surgery found *      Plan:     During this hospitalization, patient to be seen 6 x/week to address the above listed problems via gait training, therapeutic activities, therapeutic exercises  · Plan of Care Expires:  10/11/18   Plan of Care Reviewed with: patient    Subjective     Communicated with primary nurse prior to session.  Patient found supine upon PT entry to room, agreeable to treatment.      Chief Complaint: weakness/dizziness  Patient comments/goals: go home  Pain/Comfort:  · Pain Rating 1: other (see comments)(did not rate on scale)  · Location - Side 1: Bilateral  · Location - Orientation 1: generalized  · Location 1: abdomen    Patients cultural, spiritual, Rastafarian conflicts given the current situation:      Objective:     Patient found with: (ICU mointoring )     General Precautions: Standard, fall   Orthopedic Precautions:N/A   Braces: N/A     Functional Mobility:  · Bed Mobility:     · Supine to Sit: stand by assistance and contact guard assistance  · Sit to Supine: stand by assistance and contact guard assistance  · Transfers:     · Sit to Stand:   contact guard assistance and minimum assistance with rolling walker      AM-PAC 6 CLICK MOBILITY  Turning over in bed (including adjusting bedclothes, sheets and blankets)?: 3  Sitting down on and standing up from a chair with arms (e.g., wheelchair, bedside commode, etc.): 3  Moving from lying on back to sitting on the side of the bed?: 3  Moving to and from a bed to a chair (including a wheelchair)?: 3  Need to walk in hospital room?: 2  Climbing 3-5 steps with a railing?: 1  Basic Mobility Total Score: 15       Therapeutic Activities and Exercises:   Instructed in ankle pumps,unilateral bridging,TKE and hip ABD/ADD 10 reps each LE    Patient left supine with all lines intact and call button in reach..    GOALS:   Multidisciplinary Problems     Physical Therapy Goals        Problem: Physical Therapy Goal    Goal Priority Disciplines Outcome Goal Variances Interventions   Physical Therapy Goal     PT, PT/OT Ongoing (interventions implemented as appropriate)     Description:  Goals to be met by: 10/11/2018     Patient will increase functional independence with mobility by performin. Supine to sit with Stand-by Assistance  2. Sit to stand transfer with Stand-by Assistance  3. Bed to chair transfer with Stand-by Assistance using Rolling Walker  4. Gait  x 12 feet with Stand-by Assistance using Rolling Walker.                       Time Tracking:     PT Received On: 18  PT Start Time: 1053     PT Stop Time: 1120  PT Total Time (min): 27 min     Billable Minutes: Therapeutic Exercise 13    Treatment Type: Treatment  PT/PTA: PT           Renato Ayoub, PT  2018

## 2018-09-13 NOTE — PLAN OF CARE
Problem: Physical Therapy Goal  Goal: Physical Therapy Goal  Goals to be met by: 10/11/2018     Patient will increase functional independence with mobility by performin. Supine to sit with Stand-by Assistance  2. Sit to stand transfer with Stand-by Assistance  3. Bed to chair transfer with Stand-by Assistance using Rolling Walker  4. Gait  x 12 feet with Stand-by Assistance using Rolling Walker.      Outcome: Ongoing (interventions implemented as appropriate)  Recommend SNf  Improved functional mobility

## 2018-09-13 NOTE — PROGRESS NOTES
"Vancomycin Dosing and Monitoring Pharmacy Protocol    Sheryl Martines is a 63 y.o. female    Height: 5' 1" (1.549 m)   Wt Readings from Last 3 Encounters:   09/12/18 82.8 kg (182 lb 8.7 oz)   09/05/18 88.9 kg (195 lb 15.8 oz)   08/14/18 81.6 kg (180 lb)     Ideal body weight: 47.8 kg (105 lb 6.1 oz)  Adjusted ideal body weight: 61.8 kg (136 lb 3.9 oz)    Temp Readings from Last 3 Encounters:   09/13/18 98.1 °F (36.7 °C) (Oral)   09/05/18 96.3 °F (35.7 °C)   08/14/18 97.1 °F (36.2 °C) (Oral)      Lab Results   Component Value Date/Time    WBC 7.77 09/13/2018 03:06 AM    WBC 8.58 09/12/2018 03:53 AM    WBC 8.12 09/11/2018 02:22 PM      Lab Results   Component Value Date/Time    CREATININE 0.9 09/13/2018 03:06 AM    CREATININE 0.8 09/12/2018 03:53 AM    CREATININE 0.9 09/11/2018 02:22 PM      Lab Results   Component Value Date/Time    LACTATE 1.5 09/11/2018 02:22 PM    LACTATE 2.3 (H) 09/11/2018 01:34 AM    LACTATE 1.1 08/27/2018 07:45 AM       Serum creatinine: 0.9 mg/dL 09/13/18 0306  Estimated creatinine clearance: 62.4 mL/min    Antibiotics (From admission, onward)      Start     Stop Route Frequency Ordered    09/14/18 0530  vancomycin 750 mg in dextrose 5 % 250 mL IVPB (ready to mix system)      -- IV Every 12 hours (non-standard times) 09/13/18 0544    09/12/18 0530  vancomycin in dextrose 5 % 1 gram/250 mL IVPB 1,000 mg  (Vancomycin IVPB with levels panel)      -- IV Every 12 hours (non-standard times) 09/11/18 1607    09/11/18 1715  piperacillin-tazobactam 4.5 g in dextrose 5 % 100 mL IVPB (ready to mix system)      -- IV Every 8 hours (non-standard times) 09/11/18 1607    09/11/18 0130  vancomycin 250mg / 10ml oral suspension 500 mg      -- Oral Every 6 hours 09/11/18 0125          Antifungals (From admission, onward)      None            Microbiology Results (last 7 days)       Procedure Component Value Units Date/Time    Urine culture [[id number]] Collected:  09/11/18 2152    Order Status:  Completed " Specimen:  Urine, Catheterized Updated:  09/13/18 0221     Urine Culture, Routine No growth    Blood culture [[id number]] Collected:  09/11/18 1249    Order Status:  Completed Specimen:  Blood Updated:  09/12/18 1812     Blood Culture, Routine No Growth to date     Blood Culture, Routine No Growth to date    Blood culture [[id number]] Collected:  09/11/18 1248    Order Status:  Completed Specimen:  Blood Updated:  09/12/18 1812     Blood Culture, Routine No Growth to date     Blood Culture, Routine No Growth to date    Aerobic culture (Specify Source) **CANNOT BE ORDERED AS STAT** [[id number]]  (Susceptibility) Collected:  09/10/18 1817    Order Status:  Completed Specimen:  Wound from Abdomen Updated:  09/12/18 1117     Aerobic Bacterial Culture --     METHICILLIN RESISTANT STAPHYLOCOCCUS AUREUS  Moderate  Skin david also present      Clostridium difficile EIA [[id number]] Collected:  09/11/18 1928    Order Status:  Completed Specimen:  Stool Updated:  09/11/18 2008     C. diff Antigen Negative     C difficile Toxins A+B, EIA Negative     Comment: Testing not recommended for children <24 months old.       Aerobic culture [[id number]]     Order Status:  No result Specimen:  Catheter Tip     Culture, Anaerobe [[id number]]     Order Status:  No result Specimen:  Catheter Tip     Fungus culture [[id number]]     Order Status:  No result Specimen:  Catheter Tip             Indication/Target trough:   Intra-abdominal Infection/Sepsis (target trough 15-20 mcg/ml)     Hemodialysis:   N/A    Dosing Weight:   AdjBW--adjusted body weight  If ABW is greater than or equal to 30% over Ideal Body Weight, AdjBW will be used to calculate vancomycin dose.    Last Vancomycin dose: 1000 mg   Date/Time given: 9/13/18 at 0523          Vancomycin level:  Recent Labs   Lab Result Units  09/11/18   1722  09/13/18   0444   Vancomycin-Trough ug/mL  <1.1*  20.7     Recent Labs   Lab Result Units  09/11/18   0601   09/13/18   0444   Vancomycin, Random  ug/mL  <1.1   --    --    Vancomycin-Trough ug/mL   --    < >  20.7    < > = values in this interval not displayed.       Per Protocol Initial/Adjustments Dosin. Initial/Adjustment Dose: Hold vancomycin X 1 dose this PM, then adjsust vancomycin to 750 mg q12h starting in AM on 18.   2. Vancomycin Trough Level will be drawn on 9/15/18 at 1700 date/time    Pharmacy will continue to follow.    Please contact if you have any further questions. Thank you.    Jero Adam, PharmD  [phone]

## 2018-09-13 NOTE — PROGRESS NOTES
Family Med team called and asked too take a look at patient am lab, verbalized they will take a  Look at it.  AM report given to  Oncoming nurse.

## 2018-09-13 NOTE — PLAN OF CARE
attempted to complete locet. Patient already had a current locet on file completed on 8/28/18. A new locet is not needed at this time.     left voicemail for patient's daughter requesting call back.    Faxed Level 1 Pre- Admission Screening and Resident Review form to the state.

## 2018-09-13 NOTE — PLAN OF CARE
Problem: Occupational Therapy Goal  Goal: Occupational Therapy Goal  Goals to be met by: 10/13/18     Patient will increase functional independence with ADLs by performing:    LE Dressing with Minimal Assistance.  Grooming while seated with Stand-by Assistance.  Toileting from bedside commode with Minimal Assistance for hygiene and clothing management.   Supine to sit with Modified Indep.  Stand pivot transfers with Contact Guard Assistance.  Toilet transfer to bedside commode with Contact Guard Assistance.  Increased functional strength to WFL for self care skills and functional mobility.  Upper extremity exercise program x10 reps per handout, with independence.     Outcome: Ongoing (interventions implemented as appropriate)  OT re-eval performed, goals remain appropriate    SNF at discharge

## 2018-09-13 NOTE — CONSULTS
[facility]  Infectious Disease  Consult Note    Patient Name: Sheryl Martines  MRN: [id number]  Admission Date: 9/10/2018  Hospital Length of Stay: 1 days  Attending Physician: Cassy Wick MD  Primary Care Provider: Primary Doctor No     Isolation Status: No active isolations    Patient information was obtained from patient, EMS personnel, past medical records and ER records.      Consults  Assessment/Plan:     Sepsis due to other etiology    Ms Martines returned to hospital after a recent admission for appendicitis and CDiff. The symptoms prompting her return include malaise, fever and drainage from appendectomy site. After admission she was also noted to have SOB, hypoxia and hypotension. The CDiff cultures were negative and she is still on oral vanc. Her hypotension and fever are concerning for sepsis but the source is undetermined, with likely etiologies being the abdomen and pneumonia (Hx of hypoxia and SOB) although her chest imaging has not been impressive. Surgery assessment has been very helpful in this patient with Hx of abd pain, incision site drainage and imaging showing possible fluid collection.     Recs  - agree with broad spectrum antibiotics in this ill looking patient with recent deterioration after admission  - will follow up blood cultures, urine cultures  - agree that her indwelling hardware such as the port may be a source of infection, but as yet, no bacteremia has been identified, would defer removal for now  - complete course of oral vanc for CDiff              Thank you for your consult. I will follow-up with patient. Please contact us if you have any additional questions.    Virginia Gutierrez MD  Infectious Disease  [facility]    Subjective:     Principal Problem: Decreased oral intake    HPI: Sheryl Martines  is a 63 y.o. 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    She had a recent hospital  admission 8/22 after a fall during which time she was noted to have appendicits on spine imaging and who underwent open appendectomy surgery on  08/26 with Dr. Melendrez. Subsequently her wound cultures grew EColi, Enterococcus, and MRSA and she was treated with cipro, flagyl and vancomycin. Also, CDiff returned positive on 8/29 and she was treated with oral vanc and metronidazole and discharged on this regimen with a tentative end date of 9/13.     Regarding this admission, she presents to the emergency department on 9/10 with increased drainage from the appendectomy incision site for 2 days and fever for 1 day (100.4). She also mentions that she actually came in to the ER, not for the drainage but instead for malaise. Did also have nausea and diarrhoea.    The day after admission, she was noted to be in some respiratory distress, have a low sat, and low BP and was transferred to the ICU. She never required pressors but was escalated to broad spec antibiotics.     At the bedside she also endorses chronic low back pain, abd pain and SOB, but no dysuria.              Past Medical History:   Diagnosis Date    Asthma     Closed compression fracture of fourth lumbar vertebra     COPD (chronic obstructive pulmonary disease)     Coronary artery disease     Diabetes mellitus     Glaucoma     High cholesterol     Hypertension     Iritis     Pulmonary embolus     Stroke     rt sided weakness.       Past Surgical History:   Procedure Laterality Date    ABDOMINAL SURGERY      APPENDECTOMY N/A 8/26/2018    Procedure: APPENDECTOMY;  Surgeon: Bernadine Melendrez MD;  Location: [facility] OR;  Service: General;  Laterality: N/A;    APPENDECTOMY N/A 8/26/2018    Performed by Bernadine Melendrez MD at [facility] OR    APPENDECTOMY, LAPAROSCOPIC---CONVERTED TO OPEN APPENDECTOMY @0950 N/A 8/26/2018    Performed by Bernadine Melendrez MD at [facility] OR    BACK SURGERY      stimulator    CATARACT EXTRACTION      HYSTERECTOMY       LAPAROSCOPIC APPENDECTOMY N/A 8/26/2018    Procedure: APPENDECTOMY, LAPAROSCOPIC---CONVERTED TO OPEN APPENDECTOMY @0950;  Surgeon: Bernadine Melendrez MD;  Location: [facility];  Service: General;  Laterality: N/A;       Review of patient's allergies indicates:   Allergen Reactions    Ace inhibitors Swelling    Corticosteroids (glucocorticoids)     Hydralazine analogues     Tetracyclines Swelling    Travatan (with benzalkonium) [travoprost (benzalkonium)]        Medications:  Medications Prior to Admission   Medication Sig    aspirin (ECOTRIN) 81 MG EC tablet Take 1 tablet (81 mg total) by mouth once daily.    clopidogrel (PLAVIX) 75 mg tablet Take 75 mg by mouth once daily.    diltiaZEM (CARDIZEM CD) 180 MG 24 hr capsule Take 180 mg by mouth once daily.    DULoxetine (CYMBALTA) 60 MG capsule Take 60 mg by mouth once daily.    fluticasone-salmeterol 500-50 mcg/dose (ADVAIR DISKUS) 500-50 mcg/dose DsDv diskus inhaler Inhale 1 puff into the lungs 2 (two) times daily. Controller    furosemide (LASIX) 40 MG tablet Take 40 mg by mouth once daily.     gabapentin (NEURONTIN) 300 MG capsule Take 300 mg by mouth 3 (three) times daily.    lansoprazole (PREVACID) 30 MG capsule Take 30 mg by mouth once daily.    losartan potassium (COZAAR ORAL) Take 100 mg by mouth.     metroNIDAZOLE (FLAGYL) 500 MG tablet Take 1 tablet (500 mg total) by mouth every 8 (eight) hours. for 9 days    ondansetron (ZOFRAN) 4 MG tablet Take 1 tablet (4 mg total) by mouth every 6 (six) hours as needed.    prazosin (MINIPRESS) 5 MG capsule Take 5 mg by mouth 2 (two) times daily.     predniSONE (DELTASONE) 10 MG tablet Take 10 mg by mouth 2 (two) times daily.     pyridoxine, vitamin B6, (VITAMIN B-6) 50 MG Tab Take 50 mg by mouth once daily.    simvastatin (ZOCOR) 40 MG tablet Take 40 mg by mouth every evening.    sitagliptin phosphate (JANUVIA ORAL) Take by mouth.    theophylline (THEODUR) 200 MG 12 hr tablet Take 200 mg by mouth  once daily.     theophylline anhydrous (UNIPHYL ORAL) Take by mouth.    albuterol (ACCUNEB) 0.63 mg/3 mL Nebu Take 0.83 mg by nebulization every 6 (six) hours as needed. Rescue     baclofen (LIORESAL) 10 MG tablet Take 10 mg by mouth 3 (three) times daily.    clindamycin (CLEOCIN) 300 MG capsule Take 1 capsule (300 mg total) by mouth 2 (two) times daily. for 7 days    diclofenac sodium 1 % Gel Apply topically once daily. for 10 days as directed    insulin detemir U-100 (LEVEMIR) 100 unit/mL injection Inject 20 Units into the skin 2 (two) times daily.     insulin lispro (HUMALOG PEN SUBQ) Inject into the skin.    traMADol (ULTRAM-ER) 100 MG Tb24 Take 50 mg by mouth daily as needed.    vancomycin 25 mg/mL solution Take 20 mLs (500 mg total) by mouth every 6 (six) hours. for 9 days     Antibiotics (From admission, onward)    Start     Stop Route Frequency Ordered    09/12/18 0530  vancomycin in dextrose 5 % 1 gram/250 mL IVPB 1,000 mg  (Vancomycin IVPB with levels panel)      -- IV Every 12 hours (non-standard times) 09/11/18 1607    09/11/18 1715  piperacillin-tazobactam 4.5 g in dextrose 5 % 100 mL IVPB (ready to mix system)      -- IV Every 8 hours (non-standard times) 09/11/18 1607    09/11/18 0130  vancomycin 250mg / 10ml oral suspension 500 mg      -- Oral Every 6 hours 09/11/18 0125        Antifungals (From admission, onward)    None        Antivirals (From admission, onward)    None           Immunization History   Administered Date(s) Administered    PPD Test 08/28/2018       Family History     Problem Relation (Age of Onset)    Cancer Father    Diabetes Brother        Social History     Socioeconomic History    Marital status:      Spouse name: None    Number of children: None    Years of education: None    Highest education level: None   Social Needs    Financial resource strain: None    Food insecurity - worry: None    Food insecurity - inability: None    Transportation needs -  medical: None    Transportation needs - non-medical: None   Occupational History    None   Tobacco Use    Smoking status: Never Smoker   Substance and Sexual Activity    Alcohol use: No     Frequency: Never    Drug use: No    Sexual activity: No     Partners: Male   Other Topics Concern    None   Social History Narrative    None     Review of Systems   Gastrointestinal: Positive for abdominal distention and abdominal pain.     Objective:     Vital Signs (Most Recent):  Temp: 98.4 °F (36.9 °C) (09/12/18 1501)  Pulse: 90 (09/12/18 1800)  Resp: (!) 35 (09/12/18 1800)  BP: 136/70 (09/12/18 1800)  SpO2: 100 % (09/12/18 1800) Vital Signs (24h Range):  Temp:  [97.5 °F (36.4 °C)-99 °F (37.2 °C)] 98.4 °F (36.9 °C)  Pulse:  [75-90] 90  Resp:  [17-47] 35  SpO2:  [88 %-100 %] 100 %  BP: ()/() 136/70     Weight: 82.8 kg (182 lb 8.7 oz)  Body mass index is 34.49 kg/m².    Estimated Creatinine Clearance: 70.2 mL/min (based on SCr of 0.8 mg/dL).    Physical Exam   Constitutional: She is oriented to person, place, and time. She appears distressed.   Eyes: EOM are normal.   Neck: No JVD present.   Cardiovascular: Normal rate and normal heart sounds.   No murmur heard.  Pulmonary/Chest: Breath sounds normal.   Mild - mod respiratory distress, on nasal cannula normal sats   Abdominal: Soft. Bowel sounds are normal. She exhibits distension. There is tenderness. There is no rebound and no guarding.   Musculoskeletal: She exhibits no edema.   No paraspinal tenderness   Neurological: She is alert and oriented to person, place, and time.   Skin: Skin is warm and dry. No rash noted. She is not diaphoretic.   No tenderness at port site or at site of neurostimulator implant. Has left IJ and nieves   Psychiatric: She has a normal mood and affect.   Vitals reviewed.      Significant Labs:   BMP:   Recent Labs   Lab  09/12/18   0353   GLU  287*   NA  132*   K  3.8   CL  97   CO2  28   BUN  5*   CREATININE  0.8   CALCIUM  7.8*    MG  1.8     CBC:   Recent Labs   Lab  09/11/18   0601  09/11/18   1422  09/12/18   0353   WBC  11.11  8.12  8.58   HGB  9.7*  8.1*  7.4*   HCT  32.7*  27.1*  24.8*   PLT  446*  345  333     CMP:   Recent Labs   Lab  09/11/18   0601  09/11/18   1422  09/12/18   0353   NA  131*  130*  132*   K  3.9  3.7  3.8   CL  95  95  97   CO2  25  28  28   GLU  221*  334*  287*   BUN  6*  6*  5*   CREATININE  0.8  0.9  0.8   CALCIUM  8.9  8.2*  7.8*   PROT  5.8*  4.8*  4.9*   ALBUMIN  2.2*  1.9*  1.9*   BILITOT  0.5  0.4  0.3   ALKPHOS  97  83  85   AST  15  11  11   ALT  10  8*  9*   ANIONGAP  11  7*  7*   EGFRNONAA  >60  >60  >60       Significant Imaging: I have reviewed all pertinent imaging results/findings within the past 24 hours.     CXR and CT chest not significantly abnormal  CT abdomen, possible fluid collection  Cdiff negative

## 2018-09-13 NOTE — PLAN OF CARE
Problem: Patient Care Overview  Goal: Plan of Care Review  Outcome: Ongoing (interventions implemented as appropriate)  Contact precautions initiated and maintained throughout the day.  Pt encouraged to move in the bed throughout the day. Pt needs encouragement and positive reinforcement. Barrier cream preventative, and meplex to sacrum  Pt with no appetite today and consumed 0% of meals, drank liquids.  Transfused 2 uPRBC, lasix admin between units as ordered. Current unit transfusing through CVC, plans to d/c cvc post transfusion, PIV started to R FA 22g Dr Carmichael and team aware and ok with d/c CVC post blood transfusion.   Ritchie cath d/c pt due to void.  Daughter came briefly today and not responsive or compliant to contact precautions. Needs reinforcement.   Daughter called and spoke with pt, pt request all information to be shared with daughter, plan of care reviewed with daughter per pt request. Dr. Carmichael attempted to call and speak with daughter per pt request, and left a message.

## 2018-09-13 NOTE — PLAN OF CARE
TN went to meet with patient, no family at bedside. Patient resting at this time. She still would want to go to a facility on discharge. ION Monica called daughter to update her, and see if they had any preferences. Message left. TN will follow-up.    Vicky Tran RN  Transition Navigator  [phone]

## 2018-09-13 NOTE — PLAN OF CARE
Problem: Diabetes, Type 2 (Adult)  Goal: Signs and Symptoms of Listed Potential Problems Will be Absent, Minimized or Managed (Diabetes, Type 2)  Signs and symptoms of listed potential problems will be absent, minimized or managed by discharge/transition of care (reference Diabetes, Type 2 (Adult) CPG).  Outcome: Ongoing (interventions implemented as appropriate)  Monitor blood glucose and administer Insulin accordingly. Perform accu check as ordered and inform the patient when Insulin is needed.

## 2018-09-13 NOTE — PROGRESS NOTES
Patient resp treatment is complete, she is given a complete bed bath. Noted the patient actively cooperated. All linen is changed with her gown. Noted patient sacral pad is removed, noted her sacral area appears to be a Stage I there is no break down in the integrity of the skin. The patient is in need of independently rotating. Another pad was placed as a barrier for skin protection. Patient SCD's were replaced with her socks and blanket placed. Patient refused to wear her CPAP during the night.

## 2018-09-13 NOTE — SUBJECTIVE & OBJECTIVE
Interval History:     Abd pain improved  Per nurse, moderate serous drainage from abd wound     Review of Systems   Gastrointestinal: Negative for abdominal pain, constipation and diarrhea.     Objective:     Vital Signs (Most Recent):  Temp: 99.6 °F (37.6 °C) (09/13/18 0701)  Pulse: 91 (09/13/18 1240)  Resp: 20 (09/13/18 1240)  BP: 139/65 (09/13/18 0900)  SpO2: 100 % (09/13/18 1240) Vital Signs (24h Range):  Temp:  [98.1 °F (36.7 °C)-99.6 °F (37.6 °C)] 99.6 °F (37.6 °C)  Pulse:  [] 91  Resp:  [20-81] 20  SpO2:  [65 %-100 %] 100 %  BP: (119-150)/() 139/65     Weight: 82.8 kg (182 lb 8.7 oz)  Body mass index is 34.49 kg/m².    Estimated Creatinine Clearance: 62.4 mL/min (based on SCr of 0.9 mg/dL).    Physical Exam   Constitutional: She is oriented to person, place, and time.   Eyes: EOM are normal.   Neck: No JVD present.   Cardiovascular: Normal rate and regular rhythm.   Pulmonary/Chest: She has wheezes.   wheezing   Abdominal: Soft. She exhibits distension. There is tenderness. There is no rebound and no guarding.   Musculoskeletal: She exhibits no edema.   Neurological: She is alert and oriented to person, place, and time.   Skin: Skin is warm and dry. No rash noted. No erythema.   Psychiatric: She has a normal mood and affect.   Vitals reviewed.      Significant Labs:   BMP:   Recent Labs   Lab  09/13/18   0306   GLU  191*   NA  132*   K  3.7   CL  96   CO2  29   BUN  4*   CREATININE  0.9   CALCIUM  8.2*   MG  1.5*     CBC:   Recent Labs   Lab  09/11/18   1422  09/12/18   0353  09/13/18   0306   WBC  8.12  8.58  7.77   HGB  8.1*  7.4*  7.0*   HCT  27.1*  24.8*  22.9*   PLT  345  333  265     CMP:   Recent Labs   Lab  09/11/18   1422  09/12/18   0353  09/13/18   0306   NA  130*  132*  132*   K  3.7  3.8  3.7   CL  95  97  96   CO2  28  28  29   GLU  334*  287*  191*   BUN  6*  5*  4*   CREATININE  0.9  0.8  0.9   CALCIUM  8.2*  7.8*  8.2*   PROT  4.8*  4.9*  5.1*   ALBUMIN  1.9*  1.9*  2.1*   BILITOT   0.4  0.3  0.4   ALKPHOS  83  85  91   AST  11  11  15   ALT  8*  9*  9*   ANIONGAP  7*  7*  7*   EGFRNONAA  >60  >60  >60       Significant Imaging: I have reviewed all pertinent imaging results/findings within the past 24 hours.

## 2018-09-13 NOTE — PROGRESS NOTES
Progress Note  [facility]  Admit Date: 9/10/2018   LOS: 2 days   SUBJECTIVE:   Follow-up For: decreased PO intake    Continued decreased PO intake (was NPO through the AM).  Port can be taken out at later time, surgery not concerned about intra-abdominal source at this time.  MRSA in wound, blood cultures cont to be neg.  H/H continue trending down.      Will remove nieves.    ROS   See above    OBJECTIVE:   Vital Signs (Most Recent)  Temp: 99.6 °F (37.6 °C) (09/13/18 0701)  Pulse: 99 (09/13/18 0900)  Resp: 20 (09/13/18 0900)  BP: 139/65 (09/13/18 0900)  SpO2: 98 % (09/13/18 0900)    I & O (Last 24H):    Intake/Output Summary (Last 24 hours) at 9/13/2018 0950  Last data filed at 9/13/2018 0900  Gross per 24 hour   Intake 1650 ml   Output 1916 ml   Net -266 ml     Wt Readings from Last 3 Encounters:   09/12/18 82.8 kg (182 lb 8.7 oz)   09/05/18 88.9 kg (195 lb 15.8 oz)   08/14/18 81.6 kg (180 lb)       Current Diet Order   Procedures    Diet diabetic [facility]; 2000 Calorie; Low Sodium,2gm, Cardiac (Low Na/Chol)     Order Specific Question:   Indicate patient location for additional diet options:     Answer:   [facility]     Order Specific Question:   Total calories:     Answer:   2000 Calorie     Order Specific Question:   Additional Diet Options:     Answer:   Low Sodium,2gm     Order Specific Question:   Additional Diet Options:     Answer:   Cardiac (Low Na/Chol)        Physical Exam   Constitutional: She is oriented to person, place, and time. No distress.   fatigued   HENT:   Head: Normocephalic.   Eyes: Conjunctivae are normal.   Neck: Normal range of motion.   Cardiovascular: Normal rate, regular rhythm and normal heart sounds.   No murmur heard.  Pulmonary/Chest: Breath sounds normal. No respiratory distress. She has no wheezes. She has no rales.   Port in place for IV sticks, no skin changes, no tenderness   Abdominal: Soft. Bowel sounds are normal. There is tenderness (diffusely).  There is no rebound.   Staples in place, bandage in place, TTP, no erythema   Musculoskeletal: Normal range of motion. She exhibits no edema.   Neurological: She is alert and oriented to person, place, and time.   Skin: Skin is warm and dry. She is not diaphoretic. No erythema.   Psychiatric:   Flat affect   Nursing note and vitals reviewed.    Laboratory Data:  CBC  Recent Labs   Lab  09/11/18   1422  09/12/18   0353  09/13/18   0306   WBC  8.12  8.58  7.77   RBC  3.28*  3.02*  2.81*   HGB  8.1*  7.4*  7.0*   HCT  27.1*  24.8*  22.9*   PLT  345  333  265   MCV  83  82  82   MCH  24.7*  24.5*  24.9*   MCHC  29.9*  29.8*  30.6*     CMP  Recent Labs   Lab  09/11/18   1422  09/12/18   0353  09/13/18   0306   CALCIUM  8.2*  7.8*  8.2*   PROT  4.8*  4.9*  5.1*   NA  130*  132*  132*   K  3.7  3.8  3.7   CO2  28  28  29   CL  95  97  96   BUN  6*  5*  4*   CREATININE  0.9  0.8  0.9   ALKPHOS  83  85  91   ALT  8*  9*  9*   AST  11  11  15   BILITOT  0.4  0.3  0.4     POCT-Glucose  POCT Glucose   Date Value Ref Range Status   09/13/2018 215 (H) 70 - 110 mg/dL Final   09/12/2018 191 (H) 70 - 110 mg/dL Final   09/12/2018 215 (H) 70 - 110 mg/dL Final   09/12/2018 267 (H) 70 - 110 mg/dL Final   09/12/2018 304 (H) 70 - 110 mg/dL Final   09/11/2018 339 (H) 70 - 110 mg/dL Final   09/11/2018 310 (H) 70 - 110 mg/dL Final   09/10/2018 161 (H) 70 - 110 mg/dL Final     COAGS  Recent Labs   Lab  09/10/18   1938   INR  1.1   APTT  28.7     MICRO  Microbiology Results (last 7 days)     Procedure Component Value Units Date/Time    Urine culture [[id number]] Collected:  09/11/18 2152    Order Status:  Completed Specimen:  Urine, Catheterized Updated:  09/13/18 0221     Urine Culture, Routine No growth    Blood culture [[id number]] Collected:  09/11/18 1249    Order Status:  Completed Specimen:  Blood Updated:  09/12/18 1812     Blood Culture, Routine No Growth to date     Blood Culture, Routine No Growth to date    Blood culture [[id number]]  Collected:  09/11/18 1248    Order Status:  Completed Specimen:  Blood Updated:  09/12/18 1812     Blood Culture, Routine No Growth to date     Blood Culture, Routine No Growth to date    Aerobic culture (Specify Source) **CANNOT BE ORDERED AS STAT** [[id number]]  (Susceptibility) Collected:  09/10/18 1817    Order Status:  Completed Specimen:  Wound from Abdomen Updated:  09/12/18 1117     Aerobic Bacterial Culture --     METHICILLIN RESISTANT STAPHYLOCOCCUS AUREUS  Moderate  Skin david also present      Clostridium difficile EIA [[id number]] Collected:  09/11/18 1928    Order Status:  Completed Specimen:  Stool Updated:  09/11/18 2008     C. diff Antigen Negative     C difficile Toxins A+B, EIA Negative     Comment: Testing not recommended for children <24 months old.       Aerobic culture [[id number]]     Order Status:  No result Specimen:  Catheter Tip     Culture, Anaerobe [[id number]]     Order Status:  No result Specimen:  Catheter Tip     Fungus culture [[id number]]     Order Status:  No result Specimen:  Catheter Tip         LIPID PANEL  Lab Results   Component Value Date    CHOL 159 08/23/2018     Lab Results   Component Value Date    HDL 35 (L) 08/23/2018     Lab Results   Component Value Date    LDLCALC 86.6 08/23/2018     Lab Results   Component Value Date    TRIG 187 (H) 08/23/2018     Lab Results   Component Value Date    CHOLHDL 22.0 08/23/2018       Diagnostic Results:  Imaging in last 24 hours:     CXR 9/11/2018  Monitoring leads overlie the chest.  Patient is slightly rotated.  Interval placement of left IJ CVC with tip projected over the cavoatrial junction region.  No large pneumothorax or new focal opacity.  Otherwise, grossly stable radiographic appearance of the chest.    ASSESSMENT/PLAN:   Sheryl Martines is a 63 y.o. 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    Neuro  CAM ICU neg  AAO  No sedation, minimal analgesia    CV  Levophed  ordered, but BP maintaining without  Given additional fluids, was concern for septic shock  BP meds discontinued  Monitor in ICU  Cont ASA, plavix for CAD  8/23 ECHO with evidence of DD    FEN/GI  Monitoring lytes, currently stable   Cardiac/DM diet  Ct scan with possible dehiscence of wound, no intervention from surgery perspective    Renal  BUN/Cr stable 4/0.9  UOP adequate  Remove nieves    Heme//ID  On IV vanc, IV zosyn.  Today was the planned day to stop PO vanc.  Clinda stopped due to escalation of abx overall.  ID was ok with stopping PO flagyl because of nausea  H/H drifted down further, but FOBT neg, no signs of bleeding otherwise.   Giving 2 units of pRBC to help increase Ms. Martines's reserves  Urine culture pending, no results thus far  C diff neg    Endocrine  Pt has been on Prednisone 10 mg BID for appprox 10 years, reportedly for asthma  Has been continued while in-patient  Will need to be weaned off out-patient  Glucoses elevated; increase Detemir  to15 units BID, on SSI    GI ppx: protonix  VTE prophylaxis: lovenox  PT/OT/IS/ambulate    Dispo:  Pending clinical improvement, Dr. Melendrez's recs, likely need SNF on discharge (was recommended on prior admission but family declined.  May now be open to it)    9/13/2018 Julian Carmichael MD  2:17 PM

## 2018-09-13 NOTE — PT/OT/SLP RE-EVAL
"Occupational Therapy   Re-evaluation    Name: Sheryl Martines  MRN: [id number]  Admitting Diagnosis:  Decreased oral intake      Recommendations:     Discharge Recommendations: nursing facility, skilled  Discharge Equipment Recommendations:  walker, rolling  Barriers to discharge:  Decreased caregiver support    History:     Past Medical History:   Diagnosis Date    Asthma     Closed compression fracture of fourth lumbar vertebra     COPD (chronic obstructive pulmonary disease)     Coronary artery disease     Diabetes mellitus     Glaucoma     High cholesterol     Hypertension     Iritis     Pulmonary embolus     Stroke     rt sided weakness.       Past Surgical History:   Procedure Laterality Date    ABDOMINAL SURGERY      APPENDECTOMY N/A 8/26/2018    Procedure: APPENDECTOMY;  Surgeon: Bernadine Melendrez MD;  Location: [facility] OR;  Service: General;  Laterality: N/A;    APPENDECTOMY N/A 8/26/2018    Performed by Bernadine Melendrez MD at [facility] OR    APPENDECTOMY, LAPAROSCOPIC---CONVERTED TO OPEN APPENDECTOMY @0950 N/A 8/26/2018    Performed by Bernadine Melendrez MD at [facility] OR    BACK SURGERY      stimulator    CATARACT EXTRACTION      HYSTERECTOMY      LAPAROSCOPIC APPENDECTOMY N/A 8/26/2018    Procedure: APPENDECTOMY, LAPAROSCOPIC---CONVERTED TO OPEN APPENDECTOMY @0950;  Surgeon: Bernadine Melendrez MD;  Location: [facility] OR;  Service: General;  Laterality: N/A;       Subjective     Chief Complaint: weakness  Patient/Family stated goals: return to PLOF  Communicated with: nurse prior to session.  Pain/Comfort:  · Pain Rating 1: other (see comments)("a little")  · Location - Orientation 1: generalized  · Location 1: abdomen  · Pain Addressed 1: Nurse notified, Reposition, Distraction    Objective:     Patient found with:      General Precautions: Standard, fall   Orthopedic Precautions:N/A   Braces:       Occupational Performance:    Bed Mobility:    · Patient completed Rolling/Turning to Left " "with  stand by assistance and contact guard assistance  · Patient completed Scooting/Bridging with stand by assistance and contact guard assistance  · Patient completed Supine to Sit with stand by assistance and contact guard assistance  · Patient completed Sit to Supine with stand by assistance and contact guard assistance    Functional Mobility/Transfers:  · Patient completed Sit <> Stand Transfer with contact guard assistance and minimum assistance  with  rolling walker     Activities of Daily Living:  · Lower Body Dressing: total assistance socks    Cognitive/Visual Perceptual:  AO4    Physical Exam:  BUE AROM/strength WFL by observation  Good sit balance, fair stand balance, poor endurance    Patient left HOB elevated with all lines intact, call button in reach, nurse notified and PT present    [facility] 6 Click:  [facility] Total Score: 17    Treatment & Education:  Pt educated on role of OT/POC, left in care of PT 2/2 declining further OOB activity and requested to perform LB ex  Education:    Assessment:     Sheryl Martines is a 63 y.o. female with a medical diagnosis of Decreased oral intake.  She presents with performance deficits affecting function are weakness, impaired endurance, gait instability, impaired functional mobilty, impaired self care skills, impaired balance, decreased upper extremity function, decreased lower extremity function, pain, impaired cardiopulmonary response to activity, edema.      Rehab Prognosis:  good; patient would benefit from acute skilled OT services to address these deficits and reach maximum level of function.         Clinical Decision Makin.  OT Low:  "Pt evaluation falls under low complexity for evaluation coding due to performance deficits noted in 1-3 areas as stated above and 0 co-morbities affecting current functional status. Data obtained from problem focused assessments. No modifications or assistance was required for completion of evaluation. Only brief " "occupational profile and history review completed."     Plan:     Patient to be seen 5 x/week to address the above listed problems via self-care/home management, therapeutic exercises, therapeutic activities  · Plan of Care Expires: 10/13/18  · Plan of Care Reviewed with: patient    This Plan of care has been discussed with the patient who was involved in its development and understands and is in agreement with the identified goals and treatment plan    GOALS:   Multidisciplinary Problems     Occupational Therapy Goals        Problem: Occupational Therapy Goal    Goal Priority Disciplines Outcome Interventions   Occupational Therapy Goal     OT, PT/OT Ongoing (interventions implemented as appropriate)    Description:  Goals to be met by: 10/13/18     Patient will increase functional independence with ADLs by performing:    LE Dressing with Minimal Assistance.  Grooming while seated with Stand-by Assistance.  Toileting from bedside commode with Minimal Assistance for hygiene and clothing management.   Supine to sit with Modified Indep.  Stand pivot transfers with Contact Guard Assistance.  Toilet transfer to bedside commode with Contact Guard Assistance.  Increased functional strength to WFL for self care skills and functional mobility.  Upper extremity exercise program x10 reps per handout, with independence.                       Time Tracking:     OT Date of Treatment: 09/13/18  OT Start Time: 1051  OT Stop Time: 1114  OT Total Time (min): 23 min w/PT    Billable Minutes:Re-Evaluation 10    Fernie Acuña OT  9/13/2018      "

## 2018-09-13 NOTE — PLAN OF CARE
"   09/13/18 1311   Readmission Questionnaire   At the time of your discharge, did someone talk to you about what your health problems were? Yes  (Unable to assess)   At the time of discharge, did someone talk to you about what to do if you experienced worsening of your health problem? (Unable to assess)   At the time of discharge, did someone talk to you about which medication to take when you left the hospital and which ones to stop taking? Yes   At the time of discharge, did someone talk to you about when and where to follow up with a doctor after you left the hospital? Yes   What do you believe caused you to be sick enough to be re-admitted? Patient reports, "It just kept coming out."   Living Arrangements house   Does the patient have family/friends to help with healtcare needs after discharge? yes     Vicky Tran RN  Transition Navigator  [phone]  "

## 2018-09-13 NOTE — PROGRESS NOTES
[facility]  Infectious Disease  Progress Note    Patient Name: Sheryl Martines  MRN: [id number]  Admission Date: 9/10/2018  Length of Stay: 2 days  Attending Physician: Cassy Wick MD  Primary Care Provider: Primary Doctor No    Isolation Status: Contact  Assessment/Plan:      Sepsis due to other etiology    Ms Martines returned to hospital after a recent admission for appendicitis and CDiff. The symptoms prompting her return include malaise, fever and drainage from appendectomy site. After admission she was also noted to have SOB, hypoxia and hypotension. The CDiff cultures were negative and she is still on oral vanc. Her hypotension and fever are concerning for sepsis but the source is undetermined, with likely etiologies being the abdomen and pneumonia (Hx of hypoxia and SOB) although her chest imaging has not been impressive. MRSA is growing from wound site in the setting of of abd pain, incision site drainage and imaging showing questionable fluid collection but it is difficult to tell if this is clinically important since she is known to be infected with organism from a prior admission     Recs  - continue IV vanc (not decided as to whether zosyn is needed still keep for now) since MRSA is thus far the only positive culture result   - will follow up blood cultures, urine cultures  - agree that her indwelling hardware such as the port may be a source of infection, but as yet, no bacteremia has been identified, would defer removal for now  - complete course of oral vanc for CDiff            Thank you for your consult. I will follow-up with patient. Please contact us if you have any additional questions.    Virginia Gutierrez MD  Infectious Disease  [facility]    Subjective:     Principal Problem:Decreased oral intake    HPI: Sheryl Martines  is a 63 y.o. 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    She had a recent  hospital admission 8/22 after a fall during which time she was noted to have appendicits on spine imaging and who underwent open appendectomy surgery on  08/26 with Dr. Melendrez. Subsequently her wound cultures grew EColi, Enterococcus, and MRSA and she was treated with cipro, flagyl and vancomycin. Also, CDiff returned positive on 8/29 and she was treated with oral vanc and metronidazole and discharged on this regimen with a tentative end date of 9/13.     Regarding this admission, she presents to the emergency department on 9/10 with increased drainage from the appendectomy incision site for 2 days and fever for 1 day (100.4). She also mentions that she actually came in to the ER, not for the drainage but instead for malaise. Did also have nausea and diarrhoea.    The day after admission, she was noted to be in some respiratory distress, have a low sat, and low BP and was transferred to the ICU. She never required pressors but was escalated to broad spec antibiotics.     At the bedside she also endorses chronic low back pain, abd pain and SOB, but no dysuria.            Interval History:     Abd pain improved  Per nurse, moderate serous drainage from abd wound     Review of Systems   Gastrointestinal: Negative for abdominal pain, constipation and diarrhea.     Objective:     Vital Signs (Most Recent):  Temp: 99.6 °F (37.6 °C) (09/13/18 0701)  Pulse: 91 (09/13/18 1240)  Resp: 20 (09/13/18 1240)  BP: 139/65 (09/13/18 0900)  SpO2: 100 % (09/13/18 1240) Vital Signs (24h Range):  Temp:  [98.1 °F (36.7 °C)-99.6 °F (37.6 °C)] 99.6 °F (37.6 °C)  Pulse:  [] 91  Resp:  [20-81] 20  SpO2:  [65 %-100 %] 100 %  BP: (119-150)/() 139/65     Weight: 82.8 kg (182 lb 8.7 oz)  Body mass index is 34.49 kg/m².    Estimated Creatinine Clearance: 62.4 mL/min (based on SCr of 0.9 mg/dL).    Physical Exam   Constitutional: She is oriented to person, place, and time.   Eyes: EOM are normal.   Neck: No JVD present.    Cardiovascular: Normal rate and regular rhythm.   Pulmonary/Chest: She has wheezes.   wheezing   Abdominal: Soft. She exhibits distension. There is tenderness. There is no rebound and no guarding.   Musculoskeletal: She exhibits no edema.   Neurological: She is alert and oriented to person, place, and time.   Skin: Skin is warm and dry. No rash noted. No erythema.   Psychiatric: She has a normal mood and affect.   Vitals reviewed.      Significant Labs:   BMP:   Recent Labs   Lab  09/13/18   0306   GLU  191*   NA  132*   K  3.7   CL  96   CO2  29   BUN  4*   CREATININE  0.9   CALCIUM  8.2*   MG  1.5*     CBC:   Recent Labs   Lab  09/11/18   1422  09/12/18   0353  09/13/18   0306   WBC  8.12  8.58  7.77   HGB  8.1*  7.4*  7.0*   HCT  27.1*  24.8*  22.9*   PLT  345  333  265     CMP:   Recent Labs   Lab  09/11/18   1422  09/12/18   0353  09/13/18   0306   NA  130*  132*  132*   K  3.7  3.8  3.7   CL  95  97  96   CO2  28  28  29   GLU  334*  287*  191*   BUN  6*  5*  4*   CREATININE  0.9  0.8  0.9   CALCIUM  8.2*  7.8*  8.2*   PROT  4.8*  4.9*  5.1*   ALBUMIN  1.9*  1.9*  2.1*   BILITOT  0.4  0.3  0.4   ALKPHOS  83  85  91   AST  11  11  15   ALT  8*  9*  9*   ANIONGAP  7*  7*  7*   EGFRNONAA  >60  >60  >60       Significant Imaging: I have reviewed all pertinent imaging results/findings within the past 24 hours.

## 2018-09-13 NOTE — PROGRESS NOTES
Old abdomen dressing removed, noted serous drainage. Incision is well approximated with no oozing noted upon changing the dressing. 4X4   And Abd pad placed with paper tape, patient tolerated well.

## 2018-09-14 LAB
ALBUMIN SERPL BCP-MCNC: 2.3 G/DL
ALP SERPL-CCNC: 102 U/L
ALT SERPL W/O P-5'-P-CCNC: 10 U/L
ANION GAP SERPL CALC-SCNC: 4 MMOL/L
AST SERPL-CCNC: 16 U/L
BASOPHILS # BLD AUTO: 0.01 K/UL
BASOPHILS NFR BLD: 0.1 %
BILIRUB SERPL-MCNC: 1.4 MG/DL
BUN SERPL-MCNC: 5 MG/DL
CALCIUM SERPL-MCNC: 8.8 MG/DL
CHLORIDE SERPL-SCNC: 99 MMOL/L
CO2 SERPL-SCNC: 35 MMOL/L
CREAT SERPL-MCNC: 0.9 MG/DL
DIFFERENTIAL METHOD: ABNORMAL
EOSINOPHIL # BLD AUTO: 0 K/UL
EOSINOPHIL NFR BLD: 0.2 %
ERYTHROCYTE [DISTWIDTH] IN BLOOD BY AUTOMATED COUNT: 18 %
EST. GFR  (AFRICAN AMERICAN): >60 ML/MIN/1.73 M^2
EST. GFR  (NON AFRICAN AMERICAN): >60 ML/MIN/1.73 M^2
GLUCOSE SERPL-MCNC: 141 MG/DL
HCT VFR BLD AUTO: 30.6 %
HGB BLD-MCNC: 9.5 G/DL
LIPASE SERPL-CCNC: 21 U/L
LYMPHOCYTES # BLD AUTO: 0.7 K/UL
LYMPHOCYTES NFR BLD: 7.9 %
MAGNESIUM SERPL-MCNC: 1.6 MG/DL
MCH RBC QN AUTO: 25.8 PG
MCHC RBC AUTO-ENTMCNC: 31 G/DL
MCV RBC AUTO: 83 FL
MONOCYTES # BLD AUTO: 0.6 K/UL
MONOCYTES NFR BLD: 6 %
NEUTROPHILS # BLD AUTO: 8 K/UL
NEUTROPHILS NFR BLD: 85.4 %
PHOSPHATE SERPL-MCNC: 2.3 MG/DL
PLATELET # BLD AUTO: 248 K/UL
PMV BLD AUTO: 8.4 FL
POCT GLUCOSE: 118 MG/DL (ref 70–110)
POCT GLUCOSE: 119 MG/DL (ref 70–110)
POCT GLUCOSE: 160 MG/DL (ref 70–110)
POTASSIUM SERPL-SCNC: 3.6 MMOL/L
PROT SERPL-MCNC: 5.8 G/DL
RBC # BLD AUTO: 3.68 M/UL
SODIUM SERPL-SCNC: 138 MMOL/L
WBC # BLD AUTO: 9.38 K/UL

## 2018-09-14 PROCEDURE — 25000003 PHARM REV CODE 250: Performed by: STUDENT IN AN ORGANIZED HEALTH CARE EDUCATION/TRAINING PROGRAM

## 2018-09-14 PROCEDURE — 63600175 PHARM REV CODE 636 W HCPCS: Performed by: STUDENT IN AN ORGANIZED HEALTH CARE EDUCATION/TRAINING PROGRAM

## 2018-09-14 PROCEDURE — 83690 ASSAY OF LIPASE: CPT

## 2018-09-14 PROCEDURE — 85025 COMPLETE CBC W/AUTO DIFF WBC: CPT

## 2018-09-14 PROCEDURE — 11000001 HC ACUTE MED/SURG PRIVATE ROOM

## 2018-09-14 PROCEDURE — 80053 COMPREHEN METABOLIC PANEL: CPT

## 2018-09-14 PROCEDURE — 25000242 PHARM REV CODE 250 ALT 637 W/ HCPCS: Performed by: STUDENT IN AN ORGANIZED HEALTH CARE EDUCATION/TRAINING PROGRAM

## 2018-09-14 PROCEDURE — 63600175 PHARM REV CODE 636 W HCPCS: Performed by: INTERNAL MEDICINE

## 2018-09-14 PROCEDURE — 97535 SELF CARE MNGMENT TRAINING: CPT

## 2018-09-14 PROCEDURE — 94799 UNLISTED PULMONARY SVC/PX: CPT

## 2018-09-14 PROCEDURE — 84100 ASSAY OF PHOSPHORUS: CPT

## 2018-09-14 PROCEDURE — 94761 N-INVAS EAR/PLS OXIMETRY MLT: CPT

## 2018-09-14 PROCEDURE — 83735 ASSAY OF MAGNESIUM: CPT

## 2018-09-14 PROCEDURE — 94640 AIRWAY INHALATION TREATMENT: CPT

## 2018-09-14 PROCEDURE — 63600175 PHARM REV CODE 636 W HCPCS: Performed by: FAMILY MEDICINE

## 2018-09-14 PROCEDURE — 25000003 PHARM REV CODE 250: Performed by: FAMILY MEDICINE

## 2018-09-14 PROCEDURE — 86580 TB INTRADERMAL TEST: CPT | Performed by: STUDENT IN AN ORGANIZED HEALTH CARE EDUCATION/TRAINING PROGRAM

## 2018-09-14 PROCEDURE — 36415 COLL VENOUS BLD VENIPUNCTURE: CPT

## 2018-09-14 PROCEDURE — 99900035 HC TECH TIME PER 15 MIN (STAT)

## 2018-09-14 PROCEDURE — 97116 GAIT TRAINING THERAPY: CPT

## 2018-09-14 RX ORDER — LABETALOL HYDROCHLORIDE 5 MG/ML
10 INJECTION, SOLUTION INTRAVENOUS EVERY 4 HOURS PRN
Status: DISCONTINUED | OUTPATIENT
Start: 2018-09-14 | End: 2018-09-14

## 2018-09-14 RX ORDER — DILTIAZEM HYDROCHLORIDE 180 MG/1
180 CAPSULE, COATED, EXTENDED RELEASE ORAL DAILY
Status: DISCONTINUED | OUTPATIENT
Start: 2018-09-15 | End: 2018-09-20 | Stop reason: HOSPADM

## 2018-09-14 RX ORDER — SULFAMETHOXAZOLE AND TRIMETHOPRIM 800; 160 MG/1; MG/1
1 TABLET ORAL 2 TIMES DAILY
Status: DISCONTINUED | OUTPATIENT
Start: 2018-09-14 | End: 2018-09-14

## 2018-09-14 RX ORDER — LOSARTAN POTASSIUM 50 MG/1
100 TABLET ORAL DAILY
Status: DISCONTINUED | OUTPATIENT
Start: 2018-09-14 | End: 2018-09-20 | Stop reason: HOSPADM

## 2018-09-14 RX ORDER — DEXTROSE MONOHYDRATE, SODIUM CHLORIDE, AND POTASSIUM CHLORIDE 50; 1.49; 4.5 G/1000ML; G/1000ML; G/1000ML
INJECTION, SOLUTION INTRAVENOUS CONTINUOUS
Status: DISPENSED | OUTPATIENT
Start: 2018-09-14 | End: 2018-09-15

## 2018-09-14 RX ORDER — DILTIAZEM HYDROCHLORIDE 120 MG/1
120 CAPSULE, COATED, EXTENDED RELEASE ORAL DAILY
Status: DISCONTINUED | OUTPATIENT
Start: 2018-09-14 | End: 2018-09-14

## 2018-09-14 RX ADMIN — VANCOMYCIN HYDROCHLORIDE 750 MG: 750 INJECTION, POWDER, LYOPHILIZED, FOR SOLUTION INTRAVENOUS at 04:09

## 2018-09-14 RX ADMIN — PIPERACILLIN AND TAZOBACTAM 4.5 G: 4; .5 INJECTION, POWDER, LYOPHILIZED, FOR SOLUTION INTRAVENOUS; PARENTERAL at 01:09

## 2018-09-14 RX ADMIN — LACTOBACILLUS TAB 4 TABLET: TAB at 09:09

## 2018-09-14 RX ADMIN — TUBERCULIN PURIFIED PROTEIN DERIVATIVE 5 UNITS: 5 INJECTION INTRADERMAL at 04:09

## 2018-09-14 RX ADMIN — FUROSEMIDE 40 MG: 40 TABLET ORAL at 09:09

## 2018-09-14 RX ADMIN — IPRATROPIUM BROMIDE AND ALBUTEROL SULFATE 3 ML: .5; 3 SOLUTION RESPIRATORY (INHALATION) at 12:09

## 2018-09-14 RX ADMIN — IPRATROPIUM BROMIDE AND ALBUTEROL SULFATE 3 ML: .5; 3 SOLUTION RESPIRATORY (INHALATION) at 04:09

## 2018-09-14 RX ADMIN — ACETAMINOPHEN 650 MG: 325 TABLET, FILM COATED ORAL at 11:09

## 2018-09-14 RX ADMIN — ACETAMINOPHEN 650 MG: 325 TABLET, FILM COATED ORAL at 09:09

## 2018-09-14 RX ADMIN — ERTAPENEM SODIUM 1 G: 1 INJECTION, POWDER, LYOPHILIZED, FOR SOLUTION INTRAMUSCULAR; INTRAVENOUS at 07:09

## 2018-09-14 RX ADMIN — MEGESTROL ACETATE 40 MG: 40 TABLET ORAL at 09:09

## 2018-09-14 RX ADMIN — SULFAMETHOXAZOLE AND TRIMETHOPRIM 1 TABLET: 800; 160 TABLET ORAL at 09:09

## 2018-09-14 RX ADMIN — VANCOMYCIN HYDROCHLORIDE 750 MG: 750 INJECTION, POWDER, LYOPHILIZED, FOR SOLUTION INTRAVENOUS at 05:09

## 2018-09-14 RX ADMIN — DULOXETINE 60 MG: 30 CAPSULE, DELAYED RELEASE ORAL at 09:09

## 2018-09-14 RX ADMIN — PANTOPRAZOLE SODIUM 40 MG: 40 TABLET, DELAYED RELEASE ORAL at 09:09

## 2018-09-14 RX ADMIN — MEGESTROL ACETATE 40 MG: 40 TABLET ORAL at 08:09

## 2018-09-14 RX ADMIN — Medication 500 MG: at 12:09

## 2018-09-14 RX ADMIN — ASPIRIN 81 MG: 81 TABLET, COATED ORAL at 09:09

## 2018-09-14 RX ADMIN — THEOPHYLLINE ANHYDROUS 200 MG: 100 CAPSULE, EXTENDED RELEASE ORAL at 09:09

## 2018-09-14 RX ADMIN — IPRATROPIUM BROMIDE AND ALBUTEROL SULFATE 3 ML: .5; 3 SOLUTION RESPIRATORY (INHALATION) at 07:09

## 2018-09-14 RX ADMIN — LACTOBACILLUS TAB 4 TABLET: TAB at 04:09

## 2018-09-14 RX ADMIN — PRAZOSIN HYDROCHLORIDE 5 MG: 1 CAPSULE ORAL at 09:09

## 2018-09-14 RX ADMIN — DILTIAZEM HYDROCHLORIDE 120 MG: 120 CAPSULE, COATED, EXTENDED RELEASE ORAL at 03:09

## 2018-09-14 RX ADMIN — DEXTROSE MONOHYDRATE, SODIUM CHLORIDE, AND POTASSIUM CHLORIDE: 50; 4.5; 1.49 INJECTION, SOLUTION INTRAVENOUS at 02:09

## 2018-09-14 RX ADMIN — PREDNISONE 10 MG: 10 TABLET ORAL at 08:09

## 2018-09-14 RX ADMIN — ACETAMINOPHEN 650 MG: 325 TABLET, FILM COATED ORAL at 02:09

## 2018-09-14 RX ADMIN — LOSARTAN POTASSIUM 100 MG: 50 TABLET, FILM COATED ORAL at 02:09

## 2018-09-14 RX ADMIN — ALBUTEROL SULFATE 2 PUFF: 90 AEROSOL, METERED RESPIRATORY (INHALATION) at 07:09

## 2018-09-14 RX ADMIN — SIMVASTATIN 40 MG: 20 TABLET, FILM COATED ORAL at 08:09

## 2018-09-14 RX ADMIN — CLOPIDOGREL BISULFATE 75 MG: 75 TABLET ORAL at 09:09

## 2018-09-14 RX ADMIN — PREDNISONE 10 MG: 10 TABLET ORAL at 09:09

## 2018-09-14 RX ADMIN — ONDANSETRON 4 MG: 2 INJECTION INTRAMUSCULAR; INTRAVENOUS at 10:09

## 2018-09-14 RX ADMIN — ENOXAPARIN SODIUM 40 MG: 100 INJECTION SUBCUTANEOUS at 04:09

## 2018-09-14 RX ADMIN — PRAZOSIN HYDROCHLORIDE 5 MG: 1 CAPSULE ORAL at 08:09

## 2018-09-14 NOTE — NURSING
Dr. Costello/Dr. Carmichael notified that pt has peripheral access and if left IJ can be taken out; stated they will discuss with team. Notified that pt has no wound care orders for abd incision - dressing was changed today and site cleansed with wound cleanser.

## 2018-09-14 NOTE — PT/OT/SLP PROGRESS
"Physical Therapy Treatment    Patient Name:  Sheryl Martines   MRN:  [id number]    Recommendations:     Discharge Recommendations:  nursing facility, skilled   Discharge Equipment Recommendations: walker, rolling   Barriers to discharge: Decreased caregiver support    Assessment:     Sheryl Martines is a 63 y.o. female admitted with a medical diagnosis of Decreased oral intake.  She presents with the following impairments/functional limitations:  weakness, impaired endurance, impaired self care skills, impaired functional mobilty, gait instability, impaired balance, decreased safety awareness, pain, edema, impaired cardiopulmonary response to activity. Pt completed bed mobility, standing, and ambulated 12 ft with RW and min A. Recommending SNF placement upon d/c.    Rehab Prognosis: Fair+; patient would benefit from acute skilled PT services to address these deficits and reach maximum level of function.      Recent Surgery: * No surgery found *      Plan:     During this hospitalization, patient to be seen 6 x/week to address the above listed problems via gait training, therapeutic activities, therapeutic exercises  · Plan of Care Expires:  10/11/18   Plan of Care Reviewed with: patient    Subjective     Communicated with GILBERT Hastings prior to session.  Patient found supine with HOB elevated upon PT entry to room, agreeable to treatment.      Chief Complaint: lower abdominal pain- did not rate  Patient comments/goals: "ya'll are touching too much, let me do it"  Pain/Comfort:  · Pain Rating 1: (did not rate but reported lower abdominal pain)  · Location - Orientation 1: lower  · Location 1: abdomen  · Pain Addressed 1: Reposition, Distraction    Patients cultural, spiritual, Mosque conflicts given the current situation:      Objective:     Patient found with: bed alarm, telemetry     General Precautions: Standard, fall   Orthopedic Precautions:N/A   Braces:   N/A    Functional Mobility:  · Bed Mobility:   "   · Scooting: stand by assistance  · Supine to Sit: minimum assistance  · Transfers:     · Sit to Stand:  minimum assistance with rolling walker  · Bed to Chair: minimum assistance with  rolling walker  using  Step Transfer  · Gait: 12 ft with RW and min A with flexed posture and instability      AM-PAC 6 CLICK MOBILITY  Turning over in bed (including adjusting bedclothes, sheets and blankets)?: 3  Sitting down on and standing up from a chair with arms (e.g., wheelchair, bedside commode, etc.): 3  Moving from lying on back to sitting on the side of the bed?: 3  Moving to and from a bed to a chair (including a wheelchair)?: 3  Need to walk in hospital room?: 3  Climbing 3-5 steps with a railing?: 1  Basic Mobility Total Score: 16       Therapeutic Activities and Exercises:  Pt able to bridge and roll R with SBA for therapists to remove bed pan and complete hygiene following bowel movement  Pt ambulated from EOB along foot of bed to sit in bedside chair  Completed 1 stand from chair with CGA and RW in order to place a chair alarm in chair  LEs reclined and educated to complete APs, heel slides, and hip abduction slides    Patient left up in chair with call button in reach, chair alarm on and RN notified..    GOALS:   Multidisciplinary Problems     Physical Therapy Goals        Problem: Physical Therapy Goal    Goal Priority Disciplines Outcome Goal Variances Interventions   Physical Therapy Goal     PT, PT/OT Ongoing (interventions implemented as appropriate)     Description:  Goals to be met by: 10/11/2018     Patient will increase functional independence with mobility by performin. Supine to sit with Stand-by Assistance  2. Sit to stand transfer with Stand-by Assistance  3. Bed to chair transfer with Stand-by Assistance using Rolling Walker  4. Gait  x 12 feet with Stand-by Assistance using Rolling Walker.                       Time Tracking:     PT Received On: 18  PT Start Time: 922     PT Stop Time:  0946  PT Total Time (min): 24 min with OT    Billable Minutes: Gait Training 12    Treatment Type: Treatment  PT/PTA: PT     PTA Visit Number: 0     Ileana Wang, PT  09/14/2018

## 2018-09-14 NOTE — PROGRESS NOTES
Telephone report given to Nurse on 4th floor, patient will transfer in the bed with all her medications to room 423. Her condition is stable. Family member will be notified on tomorrow, secondary too the time of the morning.

## 2018-09-14 NOTE — PROGRESS NOTES
Pt encouraged to wear ordered CPAP for the night x2. Pt refused x2. Called [facility] Resident. Resident came to bedside. Stated to write note of patients refusal.

## 2018-09-14 NOTE — SUBJECTIVE & OBJECTIVE
Interval History:     No new events, moved from ICU to floor     Review of Systems   Respiratory: Positive for shortness of breath.    Gastrointestinal: Positive for abdominal distention and abdominal pain.   All other systems reviewed and are negative.    Objective:     Vital Signs (Most Recent):  Temp: 98.8 °F (37.1 °C) (09/14/18 1232)  Pulse: 83 (09/14/18 1233)  Resp: 18 (09/14/18 1233)  BP: (!) 171/82 (09/14/18 1232)  SpO2: 95 % (09/14/18 1233) Vital Signs (24h Range):  Temp:  [96.5 °F (35.8 °C)-98.8 °F (37.1 °C)] 98.8 °F (37.1 °C)  Pulse:  [] 83  Resp:  [16-64] 18  SpO2:  [87 %-100 %] 95 %  BP: (109-189)/() 171/82     Weight: 82.8 kg (182 lb 8.7 oz)  Body mass index is 34.49 kg/m².    Estimated Creatinine Clearance: 62.4 mL/min (based on SCr of 0.9 mg/dL).    Physical Exam   Constitutional: She is oriented to person, place, and time.   Neck: No JVD present.   Cardiovascular: Normal rate, regular rhythm and normal heart sounds.   No murmur heard.  Pulmonary/Chest: Effort normal and breath sounds normal.   Abdominal: Soft. Bowel sounds are normal.   (See picture) no drainage from wound site    Neurological: She is alert and oriented to person, place, and time.   Skin: Skin is warm and dry. No rash noted.   Psychiatric: She has a normal mood and affect.       Significant Labs:   BMP:   Recent Labs   Lab  09/14/18   0353   GLU  141*   NA  138   K  3.6   CL  99   CO2  35*   BUN  5*   CREATININE  0.9   CALCIUM  8.8   MG  1.6     CBC:   Recent Labs   Lab  09/13/18   0306  09/13/18 2036 09/14/18   0353   WBC  7.77  14.28*  9.38   HGB  7.0*  9.6*  9.5*   HCT  22.9*  30.8*  30.6*   PLT  265  251  248     CMP:   Recent Labs   Lab  09/13/18   0306 09/14/18   0353   NA  132*  138   K  3.7  3.6   CL  96  99   CO2  29  35*   GLU  191*  141*   BUN  4*  5*   CREATININE  0.9  0.9   CALCIUM  8.2*  8.8   PROT  5.1*  5.8*   ALBUMIN  2.1*  2.3*   BILITOT  0.4  1.4*   ALKPHOS  91  102   AST  15  16   ALT  9*  10    ANIONGAP  7*  4*   EGFRNONAA  >60  >60       Significant Imaging: I have reviewed all pertinent imaging results/findings within the past 24 hours.

## 2018-09-14 NOTE — PLAN OF CARE
[facility] bed is currently reviewing this patient.  spoke with admissions coordinator Robina. She is pending clarification of patient's insurance and medical acceptance from their medical director, Dr. Davies. Robina will follow up with  with a determination.    Referral sent to [facility]. Per Cathy bed availability wont be until Tuesday.

## 2018-09-14 NOTE — PROGRESS NOTES
Results for ITZEL AVENDANO (MRN [id number]) as of 9/13/2018 21:08   Ref. Range 9/13/2018 21:00   POC PH Latest Ref Range: 7.35 - 7.45  7.432   POC PCO2 Latest Ref Range: 35 - 45 mmHg 55.0 (H)   POC PO2 Latest Ref Range: 40 - 60 mmHg 33 (LL)   POC BE Latest Ref Range: -2 to 2 mmol/L 12   POC HCO3 Latest Ref Range: 24 - 28 mmol/L 36.7 (H)   POC SATURATED O2 Latest Ref Range: 95 - 100 % 64 (L)   POC TCO2 Latest Ref Range: 24 - 29 mmol/L 38 (H)   Sample Unknown VENOUS   DelSys Unknown Room Air       Venous Blood Gas

## 2018-09-14 NOTE — ASSESSMENT & PLAN NOTE
Ms Martines returned to hospital after a recent admission for appendicitis and CDiff. The symptoms prompting her return include malaise, fever and drainage from appendectomy site. After admission she was also noted to have SOB, hypoxia and hypotension. The CDiff cultures were negative and she is still on oral vanc. Her hypotension and fever are concerning for sepsis but the source is undetermined, with likely etiologies being the abdomen and pneumonia (Hx of hypoxia and SOB) although her chest imaging has not been impressive. Considering her abd pain, incision site drainage and imaging showing questionable fluid collection as well as multiple organisms from wound and intraabdominal cultures over these last two admissions, safer to assume that an intraabdominal process is responsible      Recs  - spoke with surgery, no procedures planned, will need to therefore have longer course of antibiotics for possible fluid collection  - will choose ertapenem and vanc to cover anerobes and enterococcus and gram negatives from last admission cultures, vanc will also cover MRSA  - aim for 4 weeks antibiotics with re imaging as outpatient prior to stopping antibiotics, to confirm duration today after discussion with attending   - agree that her indwelling hardware such as the port may be a source of infection, but as yet, no bacteremia has been identified, would defer removal for now

## 2018-09-14 NOTE — PLAN OF CARE
09/14/18 1209   Discharge Reassessment   Assessment Type Discharge Planning Reassessment   Discharge Plan A Skilled Nursing Facility   Discharge Plan B Home with family;Home Health     Vicky Tran RN  Transition Navigator  [phone]

## 2018-09-14 NOTE — PROGRESS NOTES
Nightly medications given, noted accu check is 137mg/dl. MD called too see if he wants too hold nightly Insulin. Patient did not eat all of her supper, may be 10%. New orders noted.

## 2018-09-14 NOTE — CONSULTS
"  [facility]  Adult Nutrition  Consult Note    SUMMARY     Recommendations  Recommendation/Intervention:   1. Continue DM 2000 kcal + Cardiac, low Na 2gm diet w/ Boost GC TID.  2. Encouraged PO intake and discussed food options available to pt.   3. RD to follow up with calorie count results on 9/17.    Goals: PO intake > 50% EEN and EPN by RD f/u   Nutrition Goal Status: new  Communication of RD Recs: reviewed with RN(POC)    Reason for Assessment  Reason for Assessment: consult(Decreased intake; Calorie Count)  Diagnosis: other (see comments)(decreased oral intake )  Relevant Medical History: CAD, DM, HTN, Stroke, COPD, high cholesterol, pulmonary embolus   Interdisciplinary Rounds: did not attend    General Information Comments: Consult recieved for calorie count. Calorie count posted in pt room. Pt reports decreased appetite. She states that she does not like Boost GC ONS but will drink Glucerna, which she stated she drank this morning. [facility] does not have glucerna in stock so pt will need to bring from home if she will not drink Boost GC. Encouraged pt to drink ONS 2-3x per day. Discussed possible food options with patient. Pt states she would be willing to try sports drink and soup. Pt believes she may have lost wt, however states her UBW is 181 lb, which matches current wt record.       Nutrition Discharge Planning: d/c on DM 2000 kcal + cardiac diet     Nutrition Risk Screen  Nutrition Risk Screen: no indicators present    Nutrition/Diet History  Typical Food/Fluid Intake: decreased PTA  Food Preferences: gatorade/powerade  Do you have any cultural, spiritual, Moravian conflicts, given your current situation?: no  Factors Affecting Nutritional Intake: nausea/vomiting    Anthropometrics  Temp: 98.8 °F (37.1 °C)  Height Method: Stated  Height: 5' 1" (154.9 cm)  Height (inches): 61 in  Weight Method: Bed Scale  Weight: 82.8 kg (182 lb 8.7 oz)  Weight (lb): 182.54 lb  Ideal Body Weight (IBW), " Female: 105 lb  % Ideal Body Weight, Female (lb): 173.85 lb  BMI (Calculated): 34.6  BMI Grade: 30 - 34.9- obesity - grade I       Lab/Procedures/Meds  Pertinent Labs Reviewed: reviewed  Pertinent Labs Comments: CO2 35, BUN 5, Glu 141, tpro 5.8, Phos 2.3, Alb 2.3, tbili 1.4   Pertinent Medications Reviewed: reviewed  Pertinent Medications Comments: clopidogrel, statin, duloxetine, furosemide, diltazem, enoxaparin, prednisone, promethazine, prazosin,pantoprazole, megesterol, insulin     Physical Findings/Assessment  Overall Physical Appearance: overweight  Oral/Mouth Cavity: tooth/teeth missing  Skin: intact    Estimated/Assessed Needs  Weight Used For Calorie Calculations: 82.8 kg (182 lb 8.7 oz)  Energy Calorie Requirements (kcal): 1650.48  Energy Need Method: [place]-St Jeor(X 1.25 PAL)  Protein Requirements:  g/d(1.0-1.2 g/kg )  Weight Used For Protein Calculations: 82.8 kg (182 lb 8.7 oz)  Fluid Requirements (mL): 1ml/kcal or per MD  Fluid Need Method: RDA Method  RDA Method (mL): 1650.48  CHO Requirement: 50%    Nutrition Prescription Ordered  Current Diet Order: DM 2000 kcal +  Cardiac + Low Na 2gm  Oral Nutrition Supplement: Boost GC TID    Evaluation of Received Nutrient/Fluid Intake  I/O: -2181 ml  Energy Calories Required: not meeting needs  Protein Required: not meeting needs  Fluid Required: not meeting needs  Comments: LBM 9/13  % Intake of Estimated Energy Needs: 0 - 25 %  % Meal Intake: 0 - 25 %    Nutrition Risk  Level of Risk/Frequency of Follow-up: (f/u 2x/wk)     Assessment and Plan  Nutrition Problem  Inadequate energy intake    Related to (etiology):   Decreased appetite 2/2 N/v    Signs and Symptoms (as evidenced by):   Pt reported intake (<25%), nurse report of pt intake (small bites), PO intake record (0%)    Interventions/Recommendations (treatment strategy):  1. Continue DM 2000 kcal + Cardiac diet w/ Boost GC TID.  2. Encouraged PO intake abd discussed food options avaiable to pt.    3. RD to follow.      Nutrition Diagnosis Status:   New    Monitor and Evaluation  Food and Nutrient Intake: energy intake, food and beverage intake  Food and Nutrient Adminstration: diet order  Physical Activity and Function: nutrition-related ADLs and IADLs  Anthropometric Measurements: weight, weight change  Biochemical Data, Medical Tests and Procedures: electrolyte and renal panel, glucose/endocrine profile, gastrointestinal profile, inflammatory profile, lipid profile  Nutrition-Focused Physical Findings: overall appearance     Nutrition Follow-Up  RD Follow-up?: Yes

## 2018-09-14 NOTE — PROGRESS NOTES
Again patient is doing Abdominal  Breathing and refusing her CPAP at night as she  Did on last night. MD  Was made aware of patient's refusal for the second night.

## 2018-09-14 NOTE — PROGRESS NOTES
MD Pringle called and informed on the patient room assignment. He is asked if he wants the TLC removed, MD states too leave it in and if they want it removed tomorrow, if they want it out.

## 2018-09-14 NOTE — ASSESSMENT & PLAN NOTE
Ms Martines returned to hospital after a recent admission for appendicitis and CDiff. The symptoms prompting her return include malaise, fever and drainage from appendectomy site. After admission she was also noted to have SOB, hypoxia and hypotension. The CDiff cultures were negative and she is still on oral vanc. Her hypotension and fever are concerning for sepsis but the source is undetermined, with likely etiologies being the abdomen and pneumonia (Hx of hypoxia and SOB) although her chest imaging has not been impressive. MRSA is growing from wound site in the setting of of abd pain, incision site drainage and imaging showing questionable fluid collection but it is difficult to tell if this is clinically important since she is known to be infected with organism from a prior admission     Recs  - appropriate to deescalate to IV vanc alone, since MRSA is thus far the only positive culture result, with a view to outpatient oral coverage for MRSA once discharged   - agree that her indwelling hardware such as the port may be a source of infection, but as yet, no bacteremia has been identified, would defer removal for now  - completing course of oral vanc for CDiff soon  - even if surgery not considering any procedures as inpatient, will need close follow up as outpatient with surgery

## 2018-09-14 NOTE — NURSING
Patient arrived from ICU via bed along with belongings and IV fluids. Patient AAOx 4 laying in bed. Will continue to monitor.

## 2018-09-14 NOTE — PLAN OF CARE
Problem: Patient Care Overview  Goal: Plan of Care Review  Outcome: Ongoing (interventions implemented as appropriate)  Plan of care reviewed patient verbalized understanding. IV antibiotic infused. bp medications administered. Blood glucose monitored. No coverage needed. Cardiac monitoring HR 60's NSR. Bed in lowest position, call bell in reach, bed alarm on. Will continue to monitor.

## 2018-09-14 NOTE — PLAN OF CARE
Problem: Patient Care Overview  Goal: Plan of Care Review  Outcome: Ongoing (interventions implemented as appropriate)  Pt on RA with documented sats. No resp distress noted. The proper method of use, as well as anticipated side effects, of this metered-dose inhaler are discussed and demonstrated to the patient. The proper method of use, as well as anticipated side effects, of this aerosol treatment are discussed and demonstrated to the patient. Will continue to monitor.

## 2018-09-14 NOTE — PT/OT/SLP PROGRESS
Occupational Therapy   Treatment    Name: Sheryl Martines  MRN: [id number]  Admitting Diagnosis:  Decreased oral intake       Recommendations:     Discharge Recommendations: nursing facility, skilled  Discharge Equipment Recommendations:  walker, rolling  Barriers to discharge:  Decreased caregiver support    Subjective     Communicated with: nsg prior to session.  Pain/Comfort:  · Pain Rating 1: abdominal pain     Patients cultural, spiritual, Rastafari conflicts given the current situation:      Objective:     Patient found with:      General Precautions: Standard, fall   Orthopedic Precautions:N/A   Braces: N/A     Occupational Performance:    Bed Mobility:    · Patient completed Supine to Sit with contact guard assistance   · Rolling CGA    Functional Mobility/Transfers:  · Patient completed Sit <> Stand Transfer with contact guard assistance and minimum assistance  with  rolling walker   · Patient completed Bed <> Chair Transfer using Step Transfer technique with contact guard assistance and minimum assistance with rolling walker  · Functional Mobility: CGA- Min A with RW; tremulous throughout session/mobility     Activities of Daily Living:  · Lower Body Dressing: maximal assistance    · Toileting: total assistance on bed pan     Patient left up in chair with all lines intact, call button in reach, chair alarm on and nsg notified    [facility] 6 Click:  [facility] Total Score: 17    Treatment & Education:  Pt found on bed pan   Total A for toileting on bed pan while supine   CGA/Min A for bed mobility   Min A standing from EOB - pt pulling on RW with B hands rather than following commands to push from bed  Functional mobility in room with RW to b/s chair   Seated b/s chair to wash face with supervision/set up    Education:    Assessment:     Sheryl Martines is a 63 y.o. female with a medical diagnosis of Decreased oral intake.  She presents with deconditioning.  Performance deficits affecting function are weakness,  impaired self care skills, impaired balance, impaired functional mobilty, impaired endurance, gait instability, decreased safety awareness, edema, impaired skin, impaired cardiopulmonary response to activity.  Pt with increased ax tolerance this date. Cont OT POC. SNF at d/c     Rehab Prognosis:  Good ; patient would benefit from acute skilled OT services to address these deficits and reach maximum level of function.       Plan:     Patient to be seen 5 x/week to address the above listed problems via self-care/home management, therapeutic activities, therapeutic exercises  · Plan of Care Expires: 10/13/18  · Plan of Care Reviewed with: patient    This Plan of care has been discussed with the patient who was involved in its development and understands and is in agreement with the identified goals and treatment plan    GOALS:   Multidisciplinary Problems     Occupational Therapy Goals        Problem: Occupational Therapy Goal    Goal Priority Disciplines Outcome Interventions   Occupational Therapy Goal     OT, PT/OT Ongoing (interventions implemented as appropriate)    Description:  Goals to be met by: 10/13/18     Patient will increase functional independence with ADLs by performing:    LE Dressing with Minimal Assistance.  Grooming while seated with Stand-by Assistance.  Toileting from bedside commode with Minimal Assistance for hygiene and clothing management.   Supine to sit with Modified Indep.  Stand pivot transfers with Contact Guard Assistance.  Toilet transfer to bedside commode with Contact Guard Assistance.  Increased functional strength to WFL for self care skills and functional mobility.  Upper extremity exercise program x10 reps per handout, with independence.                       Time Tracking:     OT Date of Treatment: 09/14/18  OT Start Time: 0922  OT Stop Time: 0946  OT Total Time (min): 24 min    Billable Minutes:Self Care/Home Management 12    Vianey Bang OT  9/14/2018

## 2018-09-14 NOTE — PLAN OF CARE
met with patient and daughter and bedside and discussed SNF placement. Patient stated to discuss with her daughter, Kandice. Kandice reported she needed additional clarification on her mother's condition and requested to speak with Patient relations representative. MD team notified.      provided patient's daughter with Integrated Provider SNF list and explained her mother's [place]. Daughter agreeable to allow  send her mother's referral to several facilities to see who would be able to accommodate her needs and obtain insurance approval.     Referral sent to various SNF facilities via [facility]. Per MD team, patient not ready for discharge today.

## 2018-09-14 NOTE — PROGRESS NOTES
[facility]  Infectious Disease  Progress Note    Patient Name: Sheryl Martines  MRN: [id number]  Admission Date: 9/10/2018  Length of Stay: 3 days  Attending Physician: Cassy Wick MD  Primary Care Provider: Primary Doctor No    Isolation Status: Contact  Assessment/Plan:      Sepsis due to other etiology    Ms Martines returned to hospital after a recent admission for appendicitis and CDiff. The symptoms prompting her return include malaise, fever and drainage from appendectomy site. After admission she was also noted to have SOB, hypoxia and hypotension. The CDiff cultures were negative and she is still on oral vanc. Her hypotension and fever are concerning for sepsis but the source is undetermined, with likely etiologies being the abdomen and pneumonia (Hx of hypoxia and SOB) although her chest imaging has not been impressive. MRSA is growing from wound site in the setting of of abd pain, incision site drainage and imaging showing questionable fluid collection but it is difficult to tell if this is clinically important since she is known to be infected with organism from a prior admission     Recs  - spoke with surgery, no procedures planned, will need to therefore have longer course of antibiotics for possible fluid collection  - will choose ertapenem and vanc to cover anerobes and enterococcus and gram negatives from last admission cultures, vanc will also cover MRSA  - aim for 4 weeks antibiotics with re imaging as outpatient prior to stopping antibiotics   - agree that her indwelling hardware such as the port may be a source of infection, but as yet, no bacteremia has been identified, would defer removal for now  - completing course of oral vanc for CDiff soon          Thank you for your consult. I will follow-up with patient. Please contact us if you have any additional questions.    Virginia Gutierrez MD  Infectious Disease  [facility]    Subjective:              Principal Problem:Decreased oral intake    HPI: Sheryl Martines  is a 63 y.o. 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    She had a recent hospital admission 8/22 after a fall during which time she was noted to have appendicits on spine imaging and who underwent open appendectomy surgery on  08/26 with Dr. Melendrez. Subsequently her wound cultures grew EColi, Enterococcus, and MRSA and she was treated with cipro, flagyl and vancomycin. Also, CDiff returned positive on 8/29 and she was treated with oral vanc and metronidazole and discharged on this regimen with a tentative end date of 9/13.     Regarding this admission, she presents to the emergency department on 9/10 with increased drainage from the appendectomy incision site for 2 days and fever for 1 day (100.4). She also mentions that she actually came in to the ER, not for the drainage but instead for malaise. Did also have nausea and diarrhoea.    The day after admission, she was noted to be in some respiratory distress, have a low sat, and low BP and was transferred to the ICU. She never required pressors but was escalated to broad spec antibiotics.     At the bedside she also endorses chronic low back pain, abd pain and SOB, but no dysuria.            Interval History:     No new events, moved from ICU to floor     Review of Systems   Respiratory: Positive for shortness of breath.    Gastrointestinal: Positive for abdominal distention and abdominal pain.   All other systems reviewed and are negative.    Objective:     Vital Signs (Most Recent):  Temp: 98.8 °F (37.1 °C) (09/14/18 1232)  Pulse: 83 (09/14/18 1233)  Resp: 18 (09/14/18 1233)  BP: (!) 171/82 (09/14/18 1232)  SpO2: 95 % (09/14/18 1233) Vital Signs (24h Range):  Temp:  [96.5 °F (35.8 °C)-98.8 °F (37.1 °C)] 98.8 °F (37.1 °C)  Pulse:  [] 83  Resp:  [16-64] 18  SpO2:  [87 %-100 %] 95 %  BP: (109-189)/() 171/82     Weight:  82.8 kg (182 lb 8.7 oz)  Body mass index is 34.49 kg/m².    Estimated Creatinine Clearance: 62.4 mL/min (based on SCr of 0.9 mg/dL).    Physical Exam   Constitutional: She is oriented to person, place, and time.   Neck: No JVD present.   Cardiovascular: Normal rate, regular rhythm and normal heart sounds.   No murmur heard.  Pulmonary/Chest: Effort normal and breath sounds normal.   Abdominal: Soft. Bowel sounds are normal.   (See picture) no drainage from wound site    Neurological: She is alert and oriented to person, place, and time.   Skin: Skin is warm and dry. No rash noted.   Psychiatric: She has a normal mood and affect.       Significant Labs:   BMP:   Recent Labs   Lab  09/14/18   0353   GLU  141*   NA  138   K  3.6   CL  99   CO2  35*   BUN  5*   CREATININE  0.9   CALCIUM  8.8   MG  1.6     CBC:   Recent Labs   Lab  09/13/18   0306  09/13/18 2036  09/14/18   0353   WBC  7.77  14.28*  9.38   HGB  7.0*  9.6*  9.5*   HCT  22.9*  30.8*  30.6*   PLT  265  251  248     CMP:   Recent Labs   Lab  09/13/18   0306  09/14/18   0353   NA  132*  138   K  3.7  3.6   CL  96  99   CO2  29  35*   GLU  191*  141*   BUN  4*  5*   CREATININE  0.9  0.9   CALCIUM  8.2*  8.8   PROT  5.1*  5.8*   ALBUMIN  2.1*  2.3*   BILITOT  0.4  1.4*   ALKPHOS  91  102   AST  15  16   ALT  9*  10   ANIONGAP  7*  4*   EGFRNONAA  >60  >60       Significant Imaging: I have reviewed all pertinent imaging results/findings within the past 24 hours.

## 2018-09-14 NOTE — PLAN OF CARE
Problem: Patient Care Overview  Goal: Plan of Care Review  Outcome: Ongoing (interventions implemented as appropriate)  Nutrition Problem  Inadequate energy intake     Related to (etiology):   Decreased appetite 2/2 N/v     Signs and Symptoms (as evidenced by):   Pt reported intake (<25%), nurse report of pt intake (small bites), PO intake record (0%)     Interventions/Recommendations (treatment strategy):  1. Continue DM 2000 kcal + Cardiac diet w/ Boost GC TID.  2. Encouraged PO intake abd discussed food options avaiable to pt.   3. RD to follow up with calorie count results on 9/17.     Nutrition Diagnosis Status:   New       Goals: PO intake > 50% EEN and EPN by RD f/u

## 2018-09-14 NOTE — PLAN OF CARE
attempted to reach patient's daughter Kandice on her cell number. Left voicemail requesting call back. CM to follow up on Monday.

## 2018-09-14 NOTE — NURSING
Ultrasound notified nurse that pt has to be NPO for US abd. Pt already ate breakfast, will keep pt NPO until US is completed. Dr. Carmichael notified.

## 2018-09-14 NOTE — PROGRESS NOTES
"Surgery follow up  BP (!) 166/78 (BP Location: Right arm, Patient Position: Lying)   Pulse 78   Temp 98.5 °F (36.9 °C) (Axillary)   Resp 18   Ht 5' 1" (1.549 m)   Wt 82.8 kg (182 lb 8.7 oz)   LMP  (LMP Unknown)   SpO2 95%   Breastfeeding? No   BMI 34.49 kg/m²   I/O last 3 completed shifts:  In: 1631 [P.O.:500; I.V.:50; Blood:281; IV Piggyback:800]  Out: 4337 [Urine:4337]  I/O this shift:  In: -   Out: 1250 [Urine:1250]  Recent Results (from the past 336 hour(s))   CBC with Automated Differential    Collection Time: 09/14/18  3:53 AM   Result Value Ref Range    WBC 9.38 3.90 - 12.70 K/uL    Hemoglobin 9.5 (L) 12.0 - 16.0 g/dL    Hematocrit 30.6 (L) 37.0 - 48.5 %    Platelets 248 150 - 350 K/uL   CBC auto differential    Collection Time: 09/13/18  8:36 PM   Result Value Ref Range    WBC 14.28 (H) 3.90 - 12.70 K/uL    Hemoglobin 9.6 (L) 12.0 - 16.0 g/dL    Hematocrit 30.8 (L) 37.0 - 48.5 %    Platelets 251 150 - 350 K/uL   CBC with Automated Differential    Collection Time: 09/13/18  3:06 AM   Result Value Ref Range    WBC 7.77 3.90 - 12.70 K/uL    Hemoglobin 7.0 (L) 12.0 - 16.0 g/dL    Hematocrit 22.9 (L) 37.0 - 48.5 %    Platelets 265 150 - 350 K/uL     No results found for this or any previous visit (from the past 336 hour(s)).  Imaging Results          CT Abdomen Pelvis With Contrast (Final result)  Result time 09/10/18 21:00:09    Final result by Randal Dias MD (09/10/18 21:00:09)                 Impression:      Postoperative changes of recent appendectomy with increased inflammatory changes in the right lower abdominal quadrant.    Interval dehiscence of the right lateral abdominal wall with ill-defined fluid from the right lower quadrant appear to tract into the right abdominal wall defect.  Clinical correlation and surgical consultation is recommended.    Stable appearance of a 7 cm ill-defined soft tissue collection in the right hemipelvis.  The findings most likely represent a resolving " hematoma.    Additional findings as above.    Case discussed with Dr. Duong on 09/10/18 at 8:59 p.m.      Electronically signed by: Randal Dias MD  Date:    09/10/2018  Time:    21:00             Narrative:    EXAMINATION:  CT ABDOMEN PELVIS WITH CONTRAST    CLINICAL HISTORY:  abdominal pain, s/p appendectomy;    TECHNIQUE:  Low dose axial images, sagittal and coronal reformations were obtained from the lung bases to the pubic symphysis following the IV administration of 75 mL of Omnipaque 350 .  Oral contrast was not given. Delayed images were obtained.    COMPARISON:  CT scan abdomen and pelvis dated 08/31/2018.    FINDINGS:  There are no pleural effusions.  There is no evidence of a pneumothorax.  No airspace opacity is present.  No discrete pulmonary nodule is identified.    The heart is unremarkable.  There is normal tapering of the abdominal aorta.  There are calcifications along the course of the abdominal aorta and branch vessels.  There is a probable circumaortic left renal vein.  The portal vein and mesenteric vessels are patent.  The IVC and the remainder of the venous structures are within normal limits.  There is no evidence of lymphadenopathy in the abdomen or pelvis.    The esophagus is within normal limits.  The stomach is unremarkable.  The duodenum is within normal limits.  There has been resolution of the previous small bowel obstruction.  Minimal distention is identified involving the small bowel/ileus.  There are postoperative changes of prior appendectomy.  There are persistent inflammatory changes within the right lower abdominal quadrant.  There is colonic diverticula without evidence of acute diverticulitis.    The liver is unremarkable.  The gallbladder is within normal limits.  The biliary tree is unremarkable.  The spleen is unremarkable.  The pancreas is within normal limits.    The adrenal glands are unremarkable.  There is stable appearance of a subcentimeter hypodensities in the  kidneys that are too small for complete characterization.  There is a nonobstructing stone in the upper pole of the right kidney.  There is persistent distention of the right ureter.  The inflammatory changes surrounding the right ureter.  This is incompletely evaluated secondary to streak artifact from hip hardware..  The urinary bladder appears decompressed.    There are persistent inflammatory changes in the right lower abdominal quadrant.  There is a stable appearance of a 7.1 x 4.4 cm high attenuation collection within the right pelvic sidewall.  The overall inflammatory changes in the right lower abdominal quadrant has progressed compared to the prior examination.  No definitive evidence of free air is present.  There has been interval dehiscence of the right anterior abdominal wall.  There is increased attenuation involving the right lateral rectus abdominus musculature.  There is infiltration involving the right rectus abdominus muscle with multiple areas of low attenuation.  There is ill-defined fluid tracking from the right lower quadrant into the right lateral abdominal wall defect.  There is an umbilical hernia containing omental fat.    There is an intrathecal lead in place.  There are postoperative changes of left hip arthroplasty.  There are degenerative changes in the osseous structures.  There is stable appearance of multiple compression fractures involving the lumbar spine.                              Abdomen soft non tender , wound ok  Us noted asymptomatic gall stones    Possible resolving hematoma right lower quadrant  Treat conservatively.

## 2018-09-14 NOTE — PROGRESS NOTES
Progress Note  [facility]  Admit Date: 9/10/2018   LOS: 3 days   SUBJECTIVE:   Follow-up For: decreased PO intake    Transfused 2 units pRBC yesterday with lasix between units.  Tolerated without difficulty.  Tranfered back to floor.  Discontinued zosyn.  Added bactrim.  Still on Vancomycin for MRSA in wound.  Cultures otherwise negative.    Continued low PO intake.  Adjusting insulin dosing for this.  Left IJ line still in, long-standing port in for years now.  No drainage for abdom findings at this point.  Surgery on board.  Refusing CPAP at night.    ROS   See above    OBJECTIVE:   Vital Signs (Most Recent)  Temp: 98.6 °F (37 °C) (09/14/18 0715)  Pulse: 87 (09/14/18 0746)  Resp: 18 (09/14/18 0746)  BP: (!) 164/80 (09/14/18 0715)  SpO2: 95 % (09/14/18 0746)    I & O (Last 24H):    Intake/Output Summary (Last 24 hours) at 9/14/2018 0841  Last data filed at 9/14/2018 0700  Gross per 24 hour   Intake 1631 ml   Output 3587 ml   Net -1956 ml     Wt Readings from Last 3 Encounters:   09/12/18 82.8 kg (182 lb 8.7 oz)   09/05/18 88.9 kg (195 lb 15.8 oz)   08/14/18 81.6 kg (180 lb)       Current Diet Order   Procedures    Diet diabetic [facility]; 2000 Calorie; Low Sodium,2gm, Cardiac (Low Na/Chol)     Order Specific Question:   Indicate patient location for additional diet options:     Answer:   [facility]     Order Specific Question:   Total calories:     Answer:   2000 Calorie     Order Specific Question:   Additional Diet Options:     Answer:   Low Sodium,2gm     Order Specific Question:   Additional Diet Options:     Answer:   Cardiac (Low Na/Chol)        Physical Exam   Constitutional: She is oriented to person, place, and time. No distress.   fatigued   HENT:   Head: Normocephalic.   Eyes: Conjunctivae are normal.   Neck: Normal range of motion.   Cardiovascular: Normal rate, regular rhythm and normal heart sounds.   No murmur heard.  Pulmonary/Chest: Breath sounds normal. No respiratory  distress. She has no wheezes. She has no rales.   Port in place for IV sticks though does not flush/pull back, no skin changes, no tenderness; continued abdominal breathing   Abdominal: Soft. Bowel sounds are normal. There is tenderness (diffusely). There is no rebound.   Staples in place, bandage in place, TTP, no erythema   Musculoskeletal: Normal range of motion. She exhibits no edema.   Neurological: She is alert and oriented to person, place, and time.   Skin: Skin is warm and dry. She is not diaphoretic. No erythema.   Psychiatric:   Flat affect, perks up with family   Nursing note and vitals reviewed.    Laboratory Data:  CBC  Recent Labs   Lab  09/13/18   0306  09/13/18 2036  09/14/18   0353   WBC  7.77  14.28*  9.38   RBC  2.81*  3.68*  3.68*   HGB  7.0*  9.6*  9.5*   HCT  22.9*  30.8*  30.6*   PLT  265  251  248   MCV  82  84  83   MCH  24.9*  26.1*  25.8*   MCHC  30.6*  31.2*  31.0*     CMP  Recent Labs   Lab  09/12/18   0353  09/13/18   0306  09/14/18   0353   CALCIUM  7.8*  8.2*  8.8   PROT  4.9*  5.1*  5.8*   NA  132*  132*  138   K  3.8  3.7  3.6   CO2  28  29  35*   CL  97  96  99   BUN  5*  4*  5*   CREATININE  0.8  0.9  0.9   ALKPHOS  85  91  102   ALT  9*  9*  10   AST  11  15  16   BILITOT  0.3  0.4  1.4*     POCT-Glucose  POCT Glucose   Date Value Ref Range Status   09/14/2018 160 (H) 70 - 110 mg/dL Final   09/13/2018 137 (H) 70 - 110 mg/dL Final   09/13/2018 152 (H) 70 - 110 mg/dL Final   09/13/2018 163 (H) 70 - 110 mg/dL Final   09/13/2018 215 (H) 70 - 110 mg/dL Final   09/12/2018 191 (H) 70 - 110 mg/dL Final   09/12/2018 215 (H) 70 - 110 mg/dL Final   09/12/2018 267 (H) 70 - 110 mg/dL Final   09/12/2018 304 (H) 70 - 110 mg/dL Final   09/11/2018 339 (H) 70 - 110 mg/dL Final   09/11/2018 310 (H) 70 - 110 mg/dL Final     [facility]  Recent Labs   Lab  09/10/18   1938   INR  1.1   APTT  28.7     MICRO  Microbiology Results (last 7 days)     Procedure Component Value Units Date/Time    Blood culture  [[id number]] Collected:  09/11/18 1249    Order Status:  Completed Specimen:  Blood Updated:  09/13/18 1812     Blood Culture, Routine No Growth to date     Blood Culture, Routine No Growth to date     Blood Culture, Routine No Growth to date    Blood culture [[id number]] Collected:  09/11/18 1248    Order Status:  Completed Specimen:  Blood Updated:  09/13/18 1812     Blood Culture, Routine No Growth to date     Blood Culture, Routine No Growth to date     Blood Culture, Routine No Growth to date    Urine culture [[id number]] Collected:  09/11/18 2152    Order Status:  Completed Specimen:  Urine, Catheterized Updated:  09/13/18 0221     Urine Culture, Routine No growth    Aerobic culture (Specify Source) **CANNOT BE ORDERED AS STAT** [[id number]]  (Susceptibility) Collected:  09/10/18 1817    Order Status:  Completed Specimen:  Wound from Abdomen Updated:  09/12/18 1117     Aerobic Bacterial Culture --     METHICILLIN RESISTANT STAPHYLOCOCCUS AUREUS  Moderate  Skin david also present      Clostridium difficile EIA [[id number]] Collected:  09/11/18 1928    Order Status:  Completed Specimen:  Stool Updated:  09/11/18 2008     C. diff Antigen Negative     C difficile Toxins A+B, EIA Negative     Comment: Testing not recommended for children <24 months old.       Aerobic culture [[id number]]     Order Status:  No result Specimen:  Catheter Tip     Culture, Anaerobe [[id number]]     Order Status:  No result Specimen:  Catheter Tip     Fungus culture [[id number]]     Order Status:  No result Specimen:  Catheter Tip         LIPID PANEL  Lab Results   Component Value Date    CHOL 159 08/23/2018     Lab Results   Component Value Date    HDL 35 (L) 08/23/2018     Lab Results   Component Value Date    LDLCALC 86.6 08/23/2018     Lab Results   Component Value Date    TRIG 187 (H) 08/23/2018     Lab Results   Component Value Date    CHOLHDL 22.0 08/23/2018       Diagnostic Results:  Imaging in last 24 hours:     CXR  9/11/2018  Monitoring leads overlie the chest.  Patient is slightly rotated.  Interval placement of left IJ CVC with tip projected over the cavoatrial junction region.  No large pneumothorax or new focal opacity.  Otherwise, grossly stable radiographic appearance of the chest.    ASSESSMENT/PLAN:   Sheryl Martines is a 63 y.o. 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 here almost 3 weeks s/p ruptured appy with subsequent post-op changes and failure to thrive    MRSA wound infecti/on  Prior cultures + for MRSA  Contact precautions  Cont on Vanc, bactrim    UTI  S/p 3 days vanc, zosyn  Cultures no growth    C diff  Completed course of PO vanc  Recent test neg  Bowel movements slowed    Iron deficient anemia  2/p transfusion 2 units 9/13, H/H responded appropriately  No signs of active bleed  Consider supplementation with iron    Elevated Total bili  1.4, new  Liver enzymes WNL  Obtaining RUQ ultrasound    Failure to thrive  Diet still ok, started megace  Nutrition consult  Boost-glucose with all meals    Diabetes  Determir increased to 15 BID, sliding scale  Monitoring and holding as needed due to low PO intake    GI ppx: protonix  VTE prophylaxis: lovenox  PT/OT/IS/ambulate    Dispo:  Pending clinical improvement, Dr. Melendrez's recs, likely need SNF on discharge     9/14/2018 Julian Carmichael MD  8:42 AM

## 2018-09-14 NOTE — PLAN OF CARE
Problem: Occupational Therapy Goal  Goal: Occupational Therapy Goal  Goals to be met by: 10/13/18     Patient will increase functional independence with ADLs by performing:    LE Dressing with Minimal Assistance.  Grooming while seated with Stand-by Assistance.  Toileting from bedside commode with Minimal Assistance for hygiene and clothing management.   Supine to sit with Modified Indep.  Stand pivot transfers with Contact Guard Assistance.  Toilet transfer to bedside commode with Contact Guard Assistance.  Increased functional strength to WFL for self care skills and functional mobility.  Upper extremity exercise program x10 reps per handout, with independence.      Outcome: Ongoing (interventions implemented as appropriate)  Pt with increased ax tolerance this date. Cont OT POC. SNF at d/c

## 2018-09-14 NOTE — PLAN OF CARE
Problem: Physical Therapy Goal  Goal: Physical Therapy Goal  Goals to be met by: 10/11/2018     Patient will increase functional independence with mobility by performin. Supine to sit with Stand-by Assistance  2. Sit to stand transfer with Stand-by Assistance  3. Bed to chair transfer with Stand-by Assistance using Rolling Walker  4. Gait  x 12 feet with Stand-by Assistance using Rolling Walker.      Outcome: Ongoing (interventions implemented as appropriate)  Pt completed bed mobility, standing, and ambulated 12 ft with RW and min A. Recommending SNF placement upon d/c.

## 2018-09-15 LAB
ALBUMIN SERPL BCP-MCNC: 2.3 G/DL
ALP SERPL-CCNC: 101 U/L
ALT SERPL W/O P-5'-P-CCNC: 12 U/L
ANION GAP SERPL CALC-SCNC: 7 MMOL/L
AST SERPL-CCNC: 16 U/L
BASOPHILS # BLD AUTO: 0.01 K/UL
BASOPHILS NFR BLD: 0.1 %
BILIRUB SERPL-MCNC: 0.4 MG/DL
BUN SERPL-MCNC: 6 MG/DL
CALCIUM SERPL-MCNC: 8.7 MG/DL
CHLORIDE SERPL-SCNC: 99 MMOL/L
CO2 SERPL-SCNC: 35 MMOL/L
CREAT SERPL-MCNC: 0.8 MG/DL
DIFFERENTIAL METHOD: ABNORMAL
EOSINOPHIL # BLD AUTO: 0 K/UL
EOSINOPHIL NFR BLD: 0.1 %
ERYTHROCYTE [DISTWIDTH] IN BLOOD BY AUTOMATED COUNT: 18.5 %
EST. GFR  (AFRICAN AMERICAN): >60 ML/MIN/1.73 M^2
EST. GFR  (NON AFRICAN AMERICAN): >60 ML/MIN/1.73 M^2
GLUCOSE SERPL-MCNC: 131 MG/DL
HCT VFR BLD AUTO: 32.2 %
HGB BLD-MCNC: 9.8 G/DL
LYMPHOCYTES # BLD AUTO: 1.4 K/UL
LYMPHOCYTES NFR BLD: 17.5 %
MAGNESIUM SERPL-MCNC: 1.4 MG/DL
MCH RBC QN AUTO: 25.7 PG
MCHC RBC AUTO-ENTMCNC: 30.4 G/DL
MCV RBC AUTO: 85 FL
MONOCYTES # BLD AUTO: 0.7 K/UL
MONOCYTES NFR BLD: 8.3 %
NEUTROPHILS # BLD AUTO: 5.7 K/UL
NEUTROPHILS NFR BLD: 73.5 %
PHOSPHATE SERPL-MCNC: 1.9 MG/DL
PLATELET # BLD AUTO: 220 K/UL
PMV BLD AUTO: 8.4 FL
POCT GLUCOSE: 111 MG/DL (ref 70–110)
POCT GLUCOSE: 126 MG/DL (ref 70–110)
POCT GLUCOSE: 128 MG/DL (ref 70–110)
POCT GLUCOSE: 132 MG/DL (ref 70–110)
POCT GLUCOSE: 150 MG/DL (ref 70–110)
POTASSIUM SERPL-SCNC: 3.6 MMOL/L
PROT SERPL-MCNC: 5.5 G/DL
RBC # BLD AUTO: 3.81 M/UL
SODIUM SERPL-SCNC: 141 MMOL/L
VANCOMYCIN TROUGH SERPL-MCNC: 23 UG/ML
WBC # BLD AUTO: 7.81 K/UL

## 2018-09-15 PROCEDURE — 63600175 PHARM REV CODE 636 W HCPCS: Performed by: INTERNAL MEDICINE

## 2018-09-15 PROCEDURE — 94640 AIRWAY INHALATION TREATMENT: CPT

## 2018-09-15 PROCEDURE — 63600175 PHARM REV CODE 636 W HCPCS: Performed by: STUDENT IN AN ORGANIZED HEALTH CARE EDUCATION/TRAINING PROGRAM

## 2018-09-15 PROCEDURE — 25000003 PHARM REV CODE 250: Performed by: FAMILY MEDICINE

## 2018-09-15 PROCEDURE — 25000242 PHARM REV CODE 250 ALT 637 W/ HCPCS: Performed by: STUDENT IN AN ORGANIZED HEALTH CARE EDUCATION/TRAINING PROGRAM

## 2018-09-15 PROCEDURE — 25000003 PHARM REV CODE 250: Performed by: STUDENT IN AN ORGANIZED HEALTH CARE EDUCATION/TRAINING PROGRAM

## 2018-09-15 PROCEDURE — 80202 ASSAY OF VANCOMYCIN: CPT

## 2018-09-15 PROCEDURE — 94761 N-INVAS EAR/PLS OXIMETRY MLT: CPT

## 2018-09-15 PROCEDURE — 80053 COMPREHEN METABOLIC PANEL: CPT

## 2018-09-15 PROCEDURE — 36415 COLL VENOUS BLD VENIPUNCTURE: CPT

## 2018-09-15 PROCEDURE — 94799 UNLISTED PULMONARY SVC/PX: CPT

## 2018-09-15 PROCEDURE — 84100 ASSAY OF PHOSPHORUS: CPT

## 2018-09-15 PROCEDURE — 11000001 HC ACUTE MED/SURG PRIVATE ROOM

## 2018-09-15 PROCEDURE — 83735 ASSAY OF MAGNESIUM: CPT

## 2018-09-15 PROCEDURE — 85025 COMPLETE CBC W/AUTO DIFF WBC: CPT

## 2018-09-15 PROCEDURE — 99900035 HC TECH TIME PER 15 MIN (STAT)

## 2018-09-15 PROCEDURE — 63600175 PHARM REV CODE 636 W HCPCS: Performed by: FAMILY MEDICINE

## 2018-09-15 RX ADMIN — IPRATROPIUM BROMIDE AND ALBUTEROL SULFATE 3 ML: .5; 3 SOLUTION RESPIRATORY (INHALATION) at 04:09

## 2018-09-15 RX ADMIN — SIMVASTATIN 40 MG: 20 TABLET, FILM COATED ORAL at 09:09

## 2018-09-15 RX ADMIN — MEGESTROL ACETATE 40 MG: 40 TABLET ORAL at 09:09

## 2018-09-15 RX ADMIN — IPRATROPIUM BROMIDE AND ALBUTEROL SULFATE 3 ML: .5; 3 SOLUTION RESPIRATORY (INHALATION) at 07:09

## 2018-09-15 RX ADMIN — LACTOBACILLUS TAB 4 TABLET: TAB at 09:09

## 2018-09-15 RX ADMIN — VANCOMYCIN HYDROCHLORIDE 750 MG: 750 INJECTION, POWDER, LYOPHILIZED, FOR SOLUTION INTRAVENOUS at 05:09

## 2018-09-15 RX ADMIN — VANCOMYCIN HYDROCHLORIDE 750 MG: 750 INJECTION, POWDER, LYOPHILIZED, FOR SOLUTION INTRAVENOUS at 06:09

## 2018-09-15 RX ADMIN — LACTOBACILLUS TAB 4 TABLET: TAB at 05:09

## 2018-09-15 RX ADMIN — ENOXAPARIN SODIUM 40 MG: 100 INJECTION SUBCUTANEOUS at 05:09

## 2018-09-15 RX ADMIN — DULOXETINE 60 MG: 30 CAPSULE, DELAYED RELEASE ORAL at 09:09

## 2018-09-15 RX ADMIN — PANTOPRAZOLE SODIUM 40 MG: 40 TABLET, DELAYED RELEASE ORAL at 09:09

## 2018-09-15 RX ADMIN — PREDNISONE 10 MG: 10 TABLET ORAL at 09:09

## 2018-09-15 RX ADMIN — PRAZOSIN HYDROCHLORIDE 5 MG: 1 CAPSULE ORAL at 09:09

## 2018-09-15 RX ADMIN — CLOPIDOGREL BISULFATE 75 MG: 75 TABLET ORAL at 09:09

## 2018-09-15 RX ADMIN — LOSARTAN POTASSIUM 100 MG: 50 TABLET, FILM COATED ORAL at 09:09

## 2018-09-15 RX ADMIN — IPRATROPIUM BROMIDE AND ALBUTEROL SULFATE 3 ML: .5; 3 SOLUTION RESPIRATORY (INHALATION) at 11:09

## 2018-09-15 RX ADMIN — DEXTROSE MONOHYDRATE, SODIUM CHLORIDE, AND POTASSIUM CHLORIDE: 50; 4.5; 1.49 INJECTION, SOLUTION INTRAVENOUS at 01:09

## 2018-09-15 RX ADMIN — THEOPHYLLINE ANHYDROUS 200 MG: 100 CAPSULE, EXTENDED RELEASE ORAL at 09:09

## 2018-09-15 RX ADMIN — FUROSEMIDE 40 MG: 40 TABLET ORAL at 09:09

## 2018-09-15 RX ADMIN — ASPIRIN 81 MG: 81 TABLET, COATED ORAL at 09:09

## 2018-09-15 RX ADMIN — ONDANSETRON 4 MG: 2 INJECTION INTRAMUSCULAR; INTRAVENOUS at 10:09

## 2018-09-15 RX ADMIN — IPRATROPIUM BROMIDE AND ALBUTEROL SULFATE 3 ML: .5; 3 SOLUTION RESPIRATORY (INHALATION) at 05:09

## 2018-09-15 RX ADMIN — ACETAMINOPHEN 650 MG: 325 TABLET, FILM COATED ORAL at 09:09

## 2018-09-15 RX ADMIN — IPRATROPIUM BROMIDE AND ALBUTEROL SULFATE 3 ML: .5; 3 SOLUTION RESPIRATORY (INHALATION) at 12:09

## 2018-09-15 RX ADMIN — ERTAPENEM SODIUM 1 G: 1 INJECTION, POWDER, LYOPHILIZED, FOR SOLUTION INTRAMUSCULAR; INTRAVENOUS at 08:09

## 2018-09-15 RX ADMIN — DILTIAZEM HYDROCHLORIDE 180 MG: 180 CAPSULE, COATED, EXTENDED RELEASE ORAL at 09:09

## 2018-09-15 NOTE — ASSESSMENT & PLAN NOTE
Ms Martines returned to hospital after a recent admission for appendicitis and CDiff. The symptoms prompting her return include malaise, fever and drainage from appendectomy site. After admission she was also noted to have SOB, hypoxia and hypotension. The CDiff cultures were negative and she is still on oral vanc. Her hypotension and fever are concerning for sepsis but the source is undetermined, with likely etiologies being the abdomen and pneumonia (Hx of hypoxia and SOB) although her chest imaging has not been impressive. Considering her abd pain, incision site drainage and imaging showing questionable fluid collection as well as multiple organisms from wound and intraabdominal cultures over these last two admissions, safer to assume that an intraabdominal process is responsible      Recs  - spoke with surgery, no procedures planned, will need to therefore have longer course of antibiotics for possible fluid collection  - will choose ertapenem and vanc to cover anerobes and enterococcus and gram negatives from last admission cultures, vanc will also cover MRSA  - aim for 4 weeks antibiotics after discharge with re imaging as outpatient 2 weeks post discharge, in the middle of the course to assess improvement in fluid collection     - since no bacteremia, if port can still be used, no objection from ID to using this as the route for IV outpatient antibiotics if possible  - needs weekly CMP, CBC, vanc levels and follow up with [facility] Rylie ID within 2 weeks of discharge preferably with the results of the imaging as mentioned above

## 2018-09-15 NOTE — NURSING
Called MD to see if he wants to hold night insulin. Patient hasn't been having a good appetite. MD agreed to hold insulin until morning CBG

## 2018-09-15 NOTE — PROGRESS NOTES
Progress Note  [facility]  Admit Date: 9/10/2018   LOS: 4 days   SUBJECTIVE:     Patient alert and calm this morning with no specific complaints. No fevers overnight and no chest pain, sob, n/v or sig pain.     ROS   See above    OBJECTIVE:   Vital Signs (Most Recent)  Temp: 99.3 °F (37.4 °C) (09/15/18 0401)  Pulse: 92 (09/15/18 0407)  Resp: 16 (09/15/18 0407)  BP: (!) 145/71 (09/15/18 0401)  SpO2: 96 % (09/15/18 0407)    I & O (Last 24H):    Intake/Output Summary (Last 24 hours) at 9/15/2018 0722  Last data filed at 9/15/2018 0600  Gross per 24 hour   Intake --   Output 2900 ml   Net -2900 ml     Wt Readings from Last 3 Encounters:   09/14/18 82.8 kg (182 lb 8.7 oz)   09/05/18 88.9 kg (195 lb 15.8 oz)   08/14/18 81.6 kg (180 lb)       Current Diet Order   Procedures    Diet diabetic [facility]; 2000 Calorie; Low Sodium,2gm, Cardiac (Low Na/Chol)     Order Specific Question:   Indicate patient location for additional diet options:     Answer:   [facility]     Order Specific Question:   Total calories:     Answer:   2000 Calorie     Order Specific Question:   Additional Diet Options:     Answer:   Low Sodium,2gm     Order Specific Question:   Additional Diet Options:     Answer:   Cardiac (Low Na/Chol)        Physical Exam   Constitutional: She is oriented to person, place, and time. No distress.   fatigued   HENT:   Head: Normocephalic.   Eyes: Conjunctivae are normal.   Neck: Normal range of motion.   Cardiovascular: Normal rate, regular rhythm and normal heart sounds.   No murmur heard.  Pulmonary/Chest: Breath sounds normal. No respiratory distress. She has no wheezes. She has no rales.   Port in place for IV sticks though does not flush/pull back, no skin changes, no tenderness; continued abdominal breathing   Abdominal: Soft. Bowel sounds are normal. There is no tenderness (diffusely). There is no rebound.   Staples in place, bandage in place, TTP, no erythema   Musculoskeletal: Normal  range of motion. She exhibits no edema.   Neurological: She is alert and oriented to person, place, and time.   Skin: Skin is warm and dry. She is not diaphoretic. No erythema.   Psychiatric:   Flat affect, perks up with family   Nursing note and vitals reviewed.    Laboratory Data:  CBC  Recent Labs   Lab  09/13/18 2036  09/14/18   0353  09/15/18   0503   WBC  14.28*  9.38  7.81   RBC  3.68*  3.68*  3.81*   HGB  9.6*  9.5*  9.8*   HCT  30.8*  30.6*  32.2*   PLT  251  248  220   MCV  84  83  85   MCH  26.1*  25.8*  25.7*   MCHC  31.2*  31.0*  30.4*     CMP  Recent Labs   Lab  09/13/18 0306 09/14/18   0353  09/15/18   0503   CALCIUM  8.2*  8.8  8.7   PROT  5.1*  5.8*  5.5*   NA  132*  138  141   K  3.7  3.6  3.6   CO2  29  35*  35*   CL  96  99  99   BUN  4*  5*  6*   CREATININE  0.9  0.9  0.8   ALKPHOS  91  102  101   ALT  9*  10  12   AST  15  16  16   BILITOT  0.4  1.4*  0.4     POCT-Glucose  POCT Glucose   Date Value Ref Range Status   09/15/2018 132 (H) 70 - 110 mg/dL Final   09/14/2018 111 (H) 70 - 110 mg/dL Final   09/14/2018 118 (H) 70 - 110 mg/dL Final   09/14/2018 119 (H) 70 - 110 mg/dL Final   09/14/2018 160 (H) 70 - 110 mg/dL Final   09/13/2018 137 (H) 70 - 110 mg/dL Final   09/13/2018 152 (H) 70 - 110 mg/dL Final   09/13/2018 163 (H) 70 - 110 mg/dL Final   09/13/2018 215 (H) 70 - 110 mg/dL Final   09/12/2018 191 (H) 70 - 110 mg/dL Final   09/12/2018 215 (H) 70 - 110 mg/dL Final   09/12/2018 267 (H) 70 - 110 mg/dL Final     [facility]  Recent Labs   Lab  09/10/18   1938   INR  1.1   APTT  28.7     MICRO  Microbiology Results (last 7 days)     Procedure Component Value Units Date/Time    Blood culture [[id number]] Collected:  09/11/18 1249    Order Status:  Completed Specimen:  Blood Updated:  09/14/18 1812     Blood Culture, Routine No Growth to date     Blood Culture, Routine No Growth to date     Blood Culture, Routine No Growth to date     Blood Culture, Routine No Growth to date    Blood culture  [[id number]] Collected:  09/11/18 1248    Order Status:  Completed Specimen:  Blood Updated:  09/14/18 1812     Blood Culture, Routine No Growth to date     Blood Culture, Routine No Growth to date     Blood Culture, Routine No Growth to date     Blood Culture, Routine No Growth to date    Urine culture [[id number]] Collected:  09/11/18 2152    Order Status:  Completed Specimen:  Urine, Catheterized Updated:  09/13/18 0221     Urine Culture, Routine No growth    Aerobic culture (Specify Source) **CANNOT BE ORDERED AS STAT** [[id number]]  (Susceptibility) Collected:  09/10/18 1817    Order Status:  Completed Specimen:  Wound from Abdomen Updated:  09/12/18 1117     Aerobic Bacterial Culture --     METHICILLIN RESISTANT STAPHYLOCOCCUS AUREUS  Moderate  Skin david also present      Clostridium difficile EIA [[id number]] Collected:  09/11/18 1928    Order Status:  Completed Specimen:  Stool Updated:  09/11/18 2008     C. diff Antigen Negative     C difficile Toxins A+B, EIA Negative     Comment: Testing not recommended for children <24 months old.       Aerobic culture [[id number]]     Order Status:  No result Specimen:  Catheter Tip     Culture, Anaerobe [[id number]]     Order Status:  No result Specimen:  Catheter Tip     Fungus culture [[id number]]     Order Status:  No result Specimen:  Catheter Tip         LIPID PANEL  Lab Results   Component Value Date    CHOL 159 08/23/2018     Lab Results   Component Value Date    HDL 35 (L) 08/23/2018     Lab Results   Component Value Date    LDLCALC 86.6 08/23/2018     Lab Results   Component Value Date    TRIG 187 (H) 08/23/2018     Lab Results   Component Value Date    CHOLHDL 22.0 08/23/2018       Diagnostic Results:  Imaging in last 24 hours:     CXR 9/11/2018  Monitoring leads overlie the chest.  Patient is slightly rotated.  Interval placement of left IJ CVC with tip projected over the cavoatrial junction region.  No large pneumothorax or new focal opacity.  Otherwise, grossly  stable radiographic appearance of the chest.    ASSESSMENT/PLAN:   Sheryl Martines is a 63 y.o. 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 here almost 3 weeks s/p ruptured appy with subsequent post-op changes and failure to thrive    MRSA wound infecti/on  Prior cultures + for MRSA  Contact precautions  Cont on Vanc, bactrim  On vanc and ertapenem and will need 4 weeks of abx with outpatient reimaging per ID   No need to remove port at this point.     UTI  S/p 3 days vanc, zosyn  Cultures no growth    C diff  Completed course of PO vanc  Recent test neg  Bowel movements slowed    Iron deficient anemia  2/p transfusion 2 units 9/13, H/H responded appropriately  No signs of active bleed  Consider supplementation with iron    Elevated Total bili  1.4  Liver enzymes WNL  Obtaining RUQ ultrasound  US showed hepatic steatosis and cholelithiasis     Failure to thrive  Diet still ok, started megace  Nutrition consult  Boost-glucose with all meals    Diabetes  Determir increased to 15 BID, sliding scale  Monitoring and holding as needed due to low PO intake    GI ppx: protonix  VTE prophylaxis: lovenox  PT/OT/IS/ambulate    Dispo:  Pending clinical improvement, Dr. Melendrez's recs, likely need SNF on discharge     9/15/2018 Vish Wilkes MD  8:42 AM

## 2018-09-15 NOTE — PLAN OF CARE
Problem: Patient Care Overview  Goal: Plan of Care Review  Outcome: Ongoing (interventions implemented as appropriate)  Plan of care reviewed with patient verbalized understanding. Contact precautions maintained. IV fluids infusing D51/2NS KCL 20 MeQ at 100 ml/hr, antibiotics and medications administered. Blood glucose monitored. Bed in the lowest position, call bell in reach, bed alarm on. Will continue to monitor.

## 2018-09-15 NOTE — PLAN OF CARE
Problem: Patient Care Overview  Goal: Plan of Care Review  Outcome: Ongoing (interventions implemented as appropriate)  Pt on RA with documented sats. Pt states she wears 2L NC at night despite SaO2. The proper method of use, as well as anticipated side effects, of this aerosol treatment are discussed and demonstrated to the patient. Will continue to monitor.

## 2018-09-15 NOTE — PROGRESS NOTES
[facility]  Infectious Disease  Progress Note    Patient Name: Sheryl Martines  MRN: [id number]  Admission Date: 9/10/2018  Length of Stay: 4 days  Attending Physician: Cassy Wick MD  Primary Care Provider: Primary Doctor No    Isolation Status: Contact  Assessment/Plan:      Sepsis due to other etiology    Ms Martines returned to hospital after a recent admission for appendicitis and CDiff. The symptoms prompting her return include malaise, fever and drainage from appendectomy site. After admission she was also noted to have SOB, hypoxia and hypotension. The CDiff cultures were negative and she is still on oral vanc. Her hypotension and fever are concerning for sepsis but the source is undetermined, with likely etiologies being the abdomen and pneumonia (Hx of hypoxia and SOB) although her chest imaging has not been impressive. Considering her abd pain, incision site drainage and imaging showing questionable fluid collection as well as multiple organisms from wound and intraabdominal cultures over these last two admissions, safer to assume that an intraabdominal process is responsible      Recs  - spoke with surgery, no procedures planned, will need to therefore have longer course of antibiotics for possible fluid collection  - will choose ertapenem and vanc to cover anerobes and enterococcus and gram negatives from last admission cultures, vanc will also cover MRSA  - aim for 4 weeks antibiotics after discharge with re imaging as outpatient 2 weeks post discharge, in the middle of the course to assess improvement in fluid collection     - since no bacteremia, if port can still be used, no objection from ID to using this as the route for IV outpatient antibiotics if possible  - needs weekly CMP, CBC and follow up with [facility] within 2 weeks of discharge preferably with the results of the imaging as mentioned above           Thank you for your consult. I will follow-up with patient.  Please contact us if you have any additional questions.    Virginia Gutierrez MD  Infectious Disease  [facility]    Subjective:     Principal Problem:Decreased oral intake    HPI: Sheryl Martines  is a 63 y.o. 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    She had a recent hospital admission 8/22 after a fall during which time she was noted to have appendicits on spine imaging and who underwent open appendectomy surgery on  08/26 with Dr. Melendrez. Subsequently her wound cultures grew EColi, Enterococcus, and MRSA and she was treated with cipro, flagyl and vancomycin. Also, CDiff returned positive on 8/29 and she was treated with oral vanc and metronidazole and discharged on this regimen with a tentative end date of 9/13.     Regarding this admission, she presents to the emergency department on 9/10 with increased drainage from the appendectomy incision site for 2 days and fever for 1 day (100.4). She also mentions that she actually came in to the ER, not for the drainage but instead for malaise. Did also have nausea and diarrhoea.    The day after admission, she was noted to be in some respiratory distress, have a low sat, and low BP and was transferred to the ICU. She never required pressors but was escalated to broad spec antibiotics.     At the bedside she also endorses chronic low back pain, abd pain and SOB, but no dysuria.            Interval History:     Diarrhoea improved    Review of Systems   Respiratory: Positive for shortness of breath.    Gastrointestinal: Positive for abdominal pain.   All other systems reviewed and are negative.    Objective:     Vital Signs (Most Recent):  Temp: 98.7 °F (37.1 °C) (09/15/18 0852)  Pulse: (!) 112 (09/15/18 0852)  Resp: 16 (09/15/18 0852)  BP: (!) 147/63 (09/15/18 0852)  SpO2: 99 % (09/15/18 0742) Vital Signs (24h Range):  Temp:  [98.5 °F (36.9 °C)-99.3 °F (37.4 °C)] 98.7 °F (37.1 °C)  Pulse:   [] 112  Resp:  [16-20] 16  SpO2:  [95 %-99 %] 99 %  BP: (145-171)/(63-82) 147/63     Weight: 82.8 kg (182 lb 8.7 oz)  Body mass index is 34.49 kg/m².    Estimated Creatinine Clearance: 70.2 mL/min (based on SCr of 0.8 mg/dL).    Physical Exam   Cardiovascular: Normal rate and regular rhythm.   Abdominal: Soft. Bowel sounds are normal. She exhibits distension. There is tenderness.   Musculoskeletal: She exhibits no edema.   Skin: No rash noted.   Lt IJ   Psychiatric: She has a normal mood and affect. Her behavior is normal.       Significant Labs:   BMP:   Recent Labs   Lab  09/15/18   0503   GLU  131*   NA  141   K  3.6   CL  99   CO2  35*   BUN  6*   CREATININE  0.8   CALCIUM  8.7   MG  1.4*     CBC:   Recent Labs   Lab  09/13/18   2036  09/14/18   0353  09/15/18   0503   WBC  14.28*  9.38  7.81   HGB  9.6*  9.5*  9.8*   HCT  30.8*  30.6*  32.2*   PLT  251  248  220     CMP:   Recent Labs   Lab  09/14/18   0353  09/15/18   0503   NA  138  141   K  3.6  3.6   CL  99  99   CO2  35*  35*   GLU  141*  131*   BUN  5*  6*   CREATININE  0.9  0.8   CALCIUM  8.8  8.7   PROT  5.8*  5.5*   ALBUMIN  2.3*  2.3*   BILITOT  1.4*  0.4   ALKPHOS  102  101   AST  16  16   ALT  10  12   ANIONGAP  4*  7*   EGFRNONAA  >60  >60       Significant Imaging: I have reviewed all pertinent imaging results/findings within the past 24 hours.

## 2018-09-15 NOTE — SUBJECTIVE & OBJECTIVE
Interval History:     Diarrhoea improved    Review of Systems   Respiratory: Positive for shortness of breath.    Gastrointestinal: Positive for abdominal pain.   All other systems reviewed and are negative.    Objective:     Vital Signs (Most Recent):  Temp: 98.7 °F (37.1 °C) (09/15/18 0852)  Pulse: (!) 112 (09/15/18 0852)  Resp: 16 (09/15/18 0852)  BP: (!) 147/63 (09/15/18 0852)  SpO2: 99 % (09/15/18 0742) Vital Signs (24h Range):  Temp:  [98.5 °F (36.9 °C)-99.3 °F (37.4 °C)] 98.7 °F (37.1 °C)  Pulse:  [] 112  Resp:  [16-20] 16  SpO2:  [95 %-99 %] 99 %  BP: (145-171)/(63-82) 147/63     Weight: 82.8 kg (182 lb 8.7 oz)  Body mass index is 34.49 kg/m².    Estimated Creatinine Clearance: 70.2 mL/min (based on SCr of 0.8 mg/dL).    Physical Exam   Cardiovascular: Normal rate and regular rhythm.   Abdominal: Soft. Bowel sounds are normal. She exhibits distension. There is tenderness.   Musculoskeletal: She exhibits no edema.   Skin: No rash noted.   Lt IJ   Psychiatric: She has a normal mood and affect. Her behavior is normal.       Significant Labs:   BMP:   Recent Labs   Lab  09/15/18   0503   GLU  131*   NA  141   K  3.6   CL  99   CO2  35*   BUN  6*   CREATININE  0.8   CALCIUM  8.7   MG  1.4*     CBC:   Recent Labs   Lab  09/13/18 2036 09/14/18   0353  09/15/18   0503   WBC  14.28*  9.38  7.81   HGB  9.6*  9.5*  9.8*   HCT  30.8*  30.6*  32.2*   PLT  251  248  220     CMP:   Recent Labs   Lab  09/14/18   0353  09/15/18   0503   NA  138  141   K  3.6  3.6   CL  99  99   CO2  35*  35*   GLU  141*  131*   BUN  5*  6*   CREATININE  0.9  0.8   CALCIUM  8.8  8.7   PROT  5.8*  5.5*   ALBUMIN  2.3*  2.3*   BILITOT  1.4*  0.4   ALKPHOS  102  101   AST  16  16   ALT  10  12   ANIONGAP  4*  7*   EGFRNONAA  >60  >60       Significant Imaging: I have reviewed all pertinent imaging results/findings within the past 24 hours.

## 2018-09-16 LAB
ALBUMIN SERPL BCP-MCNC: 2.3 G/DL
ALP SERPL-CCNC: 100 U/L
ALT SERPL W/O P-5'-P-CCNC: 10 U/L
ANION GAP SERPL CALC-SCNC: 6 MMOL/L
AST SERPL-CCNC: 15 U/L
BACTERIA BLD CULT: NORMAL
BACTERIA BLD CULT: NORMAL
BASOPHILS # BLD AUTO: 0.01 K/UL
BASOPHILS NFR BLD: 0.1 %
BILIRUB SERPL-MCNC: 0.4 MG/DL
BUN SERPL-MCNC: 7 MG/DL
CALCIUM SERPL-MCNC: 9.1 MG/DL
CHLORIDE SERPL-SCNC: 97 MMOL/L
CO2 SERPL-SCNC: 35 MMOL/L
CREAT SERPL-MCNC: 0.8 MG/DL
DIFFERENTIAL METHOD: ABNORMAL
EOSINOPHIL # BLD AUTO: 0 K/UL
EOSINOPHIL NFR BLD: 0.3 %
ERYTHROCYTE [DISTWIDTH] IN BLOOD BY AUTOMATED COUNT: 19.1 %
EST. GFR  (AFRICAN AMERICAN): >60 ML/MIN/1.73 M^2
EST. GFR  (NON AFRICAN AMERICAN): >60 ML/MIN/1.73 M^2
GLUCOSE SERPL-MCNC: 81 MG/DL
HCT VFR BLD AUTO: 32.5 %
HGB BLD-MCNC: 10 G/DL
LYMPHOCYTES # BLD AUTO: 1.3 K/UL
LYMPHOCYTES NFR BLD: 18 %
MAGNESIUM SERPL-MCNC: 1.3 MG/DL
MCH RBC QN AUTO: 26 PG
MCHC RBC AUTO-ENTMCNC: 30.8 G/DL
MCV RBC AUTO: 85 FL
MONOCYTES # BLD AUTO: 0.5 K/UL
MONOCYTES NFR BLD: 6.7 %
NEUTROPHILS # BLD AUTO: 5.4 K/UL
NEUTROPHILS NFR BLD: 74.5 %
PHOSPHATE SERPL-MCNC: 2.9 MG/DL
PLATELET # BLD AUTO: 197 K/UL
PMV BLD AUTO: 8.4 FL
POCT GLUCOSE: 120 MG/DL (ref 70–110)
POCT GLUCOSE: 87 MG/DL (ref 70–110)
POCT GLUCOSE: 92 MG/DL (ref 70–110)
POCT GLUCOSE: 98 MG/DL (ref 70–110)
POTASSIUM SERPL-SCNC: 3.7 MMOL/L
PROT SERPL-MCNC: 5.4 G/DL
RBC # BLD AUTO: 3.84 M/UL
SODIUM SERPL-SCNC: 138 MMOL/L
TB INDURATION 48 - 72 HR READ: 0 MM
VANCOMYCIN TROUGH SERPL-MCNC: 17.5 UG/ML
WBC # BLD AUTO: 7.29 K/UL

## 2018-09-16 PROCEDURE — 83735 ASSAY OF MAGNESIUM: CPT

## 2018-09-16 PROCEDURE — 27000221 HC OXYGEN, UP TO 24 HOURS

## 2018-09-16 PROCEDURE — 80202 ASSAY OF VANCOMYCIN: CPT

## 2018-09-16 PROCEDURE — 25000003 PHARM REV CODE 250: Performed by: STUDENT IN AN ORGANIZED HEALTH CARE EDUCATION/TRAINING PROGRAM

## 2018-09-16 PROCEDURE — 63600175 PHARM REV CODE 636 W HCPCS: Performed by: FAMILY MEDICINE

## 2018-09-16 PROCEDURE — 94799 UNLISTED PULMONARY SVC/PX: CPT

## 2018-09-16 PROCEDURE — 63600175 PHARM REV CODE 636 W HCPCS: Performed by: STUDENT IN AN ORGANIZED HEALTH CARE EDUCATION/TRAINING PROGRAM

## 2018-09-16 PROCEDURE — 94761 N-INVAS EAR/PLS OXIMETRY MLT: CPT

## 2018-09-16 PROCEDURE — 85025 COMPLETE CBC W/AUTO DIFF WBC: CPT

## 2018-09-16 PROCEDURE — 36415 COLL VENOUS BLD VENIPUNCTURE: CPT

## 2018-09-16 PROCEDURE — 97110 THERAPEUTIC EXERCISES: CPT

## 2018-09-16 PROCEDURE — 25000242 PHARM REV CODE 250 ALT 637 W/ HCPCS: Performed by: STUDENT IN AN ORGANIZED HEALTH CARE EDUCATION/TRAINING PROGRAM

## 2018-09-16 PROCEDURE — 97530 THERAPEUTIC ACTIVITIES: CPT

## 2018-09-16 PROCEDURE — 25000003 PHARM REV CODE 250: Performed by: FAMILY MEDICINE

## 2018-09-16 PROCEDURE — 84100 ASSAY OF PHOSPHORUS: CPT

## 2018-09-16 PROCEDURE — 11000001 HC ACUTE MED/SURG PRIVATE ROOM

## 2018-09-16 PROCEDURE — 99900035 HC TECH TIME PER 15 MIN (STAT)

## 2018-09-16 PROCEDURE — 94640 AIRWAY INHALATION TREATMENT: CPT

## 2018-09-16 PROCEDURE — 63600175 PHARM REV CODE 636 W HCPCS: Performed by: INTERNAL MEDICINE

## 2018-09-16 PROCEDURE — 80053 COMPREHEN METABOLIC PANEL: CPT

## 2018-09-16 RX ORDER — ALBUTEROL SULFATE 90 UG/1
2 AEROSOL, METERED RESPIRATORY (INHALATION) EVERY 4 HOURS PRN
Status: DISCONTINUED | OUTPATIENT
Start: 2018-09-16 | End: 2018-09-20 | Stop reason: HOSPADM

## 2018-09-16 RX ADMIN — LACTOBACILLUS TAB 4 TABLET: TAB at 12:09

## 2018-09-16 RX ADMIN — ONDANSETRON 4 MG: 2 INJECTION INTRAMUSCULAR; INTRAVENOUS at 09:09

## 2018-09-16 RX ADMIN — SIMVASTATIN 40 MG: 20 TABLET, FILM COATED ORAL at 09:09

## 2018-09-16 RX ADMIN — LACTOBACILLUS TAB 4 TABLET: TAB at 05:09

## 2018-09-16 RX ADMIN — PRAZOSIN HYDROCHLORIDE 5 MG: 1 CAPSULE ORAL at 08:09

## 2018-09-16 RX ADMIN — PRAZOSIN HYDROCHLORIDE 5 MG: 1 CAPSULE ORAL at 09:09

## 2018-09-16 RX ADMIN — DULOXETINE 60 MG: 30 CAPSULE, DELAYED RELEASE ORAL at 08:09

## 2018-09-16 RX ADMIN — IPRATROPIUM BROMIDE AND ALBUTEROL SULFATE 3 ML: .5; 3 SOLUTION RESPIRATORY (INHALATION) at 11:09

## 2018-09-16 RX ADMIN — IPRATROPIUM BROMIDE AND ALBUTEROL SULFATE 3 ML: .5; 3 SOLUTION RESPIRATORY (INHALATION) at 04:09

## 2018-09-16 RX ADMIN — THEOPHYLLINE ANHYDROUS 200 MG: 100 CAPSULE, EXTENDED RELEASE ORAL at 08:09

## 2018-09-16 RX ADMIN — ACETAMINOPHEN 650 MG: 325 TABLET, FILM COATED ORAL at 06:09

## 2018-09-16 RX ADMIN — CLOPIDOGREL BISULFATE 75 MG: 75 TABLET ORAL at 08:09

## 2018-09-16 RX ADMIN — ASPIRIN 81 MG: 81 TABLET, COATED ORAL at 09:09

## 2018-09-16 RX ADMIN — IPRATROPIUM BROMIDE AND ALBUTEROL SULFATE 3 ML: .5; 3 SOLUTION RESPIRATORY (INHALATION) at 12:09

## 2018-09-16 RX ADMIN — IPRATROPIUM BROMIDE AND ALBUTEROL SULFATE 3 ML: .5; 3 SOLUTION RESPIRATORY (INHALATION) at 07:09

## 2018-09-16 RX ADMIN — FUROSEMIDE 40 MG: 40 TABLET ORAL at 08:09

## 2018-09-16 RX ADMIN — VANCOMYCIN HYDROCHLORIDE 750 MG: 750 INJECTION, POWDER, LYOPHILIZED, FOR SOLUTION INTRAVENOUS at 04:09

## 2018-09-16 RX ADMIN — ENOXAPARIN SODIUM 40 MG: 100 INJECTION SUBCUTANEOUS at 05:09

## 2018-09-16 RX ADMIN — VANCOMYCIN HYDROCHLORIDE 750 MG: 750 INJECTION, POWDER, LYOPHILIZED, FOR SOLUTION INTRAVENOUS at 06:09

## 2018-09-16 RX ADMIN — PANTOPRAZOLE SODIUM 40 MG: 40 TABLET, DELAYED RELEASE ORAL at 08:09

## 2018-09-16 RX ADMIN — MEGESTROL ACETATE 40 MG: 40 TABLET ORAL at 09:09

## 2018-09-16 RX ADMIN — MEGESTROL ACETATE 40 MG: 40 TABLET ORAL at 08:09

## 2018-09-16 RX ADMIN — LOSARTAN POTASSIUM 100 MG: 50 TABLET, FILM COATED ORAL at 08:09

## 2018-09-16 RX ADMIN — DILTIAZEM HYDROCHLORIDE 180 MG: 180 CAPSULE, COATED, EXTENDED RELEASE ORAL at 08:09

## 2018-09-16 RX ADMIN — ERTAPENEM SODIUM 1 G: 1 INJECTION, POWDER, LYOPHILIZED, FOR SOLUTION INTRAMUSCULAR; INTRAVENOUS at 08:09

## 2018-09-16 RX ADMIN — LACTOBACILLUS TAB 4 TABLET: TAB at 08:09

## 2018-09-16 RX ADMIN — PREDNISONE 10 MG: 10 TABLET ORAL at 08:09

## 2018-09-16 RX ADMIN — PREDNISONE 10 MG: 10 TABLET ORAL at 09:09

## 2018-09-16 NOTE — PHYSICIAN QUERY
PT Name: Sheryl Martines  MR #: [id number]    Physician Query Form - Nutrition Clarification     CDS/: Brie Estevez RN               Contact information: [email or web address]    This form is a permanent document in the medical record.     Query Date: September 16, 2018    By submitting this query, we are merely seeking further clarification of documentation.. Please utilize your independent clinical judgment when addressing the question(s) below.    The Medical record contains the following:   Indicators  Supporting Clinical Findings Location in Medical Record   x % of Estimated Energy Intake over a time frame from p.o., TF, or TPN Pt believes she may have lost wt, however states her UBW is 181 lb, which matches current wt. record     % Meal Intake: 0 - 25 %  Nutrition Problem: inadequate energy intake   Related to (etiology): Decreased appetite 2/2 N/V   Signs and Symptoms (as evidenced by):    Pt reported intake (<25%), nurse report of pt intake (small bites), PO intake record (0%)    RD consult 9/14        RD consult 9/14        Weight Status over a time frame      Subcutaneous Fat and/or Muscle Loss      Fluid Accumulation or Edema      Reduced  Strength     x Wt / BMI / Usual Body Weight Bed scale weight: 82.8kg (182 lb 8.7 oz)  BMI 34.6   RD consult 9/14   x Delayed Wound Healing / Failure to Thrive Failure to thrive.  Hospital Medicine PN 9/14   x Acute or Chronic Illness MRSA wound infection, UTI, EVARISTO, DM,        Sepsis, DM History of stroke in 2012 with right sided residual Hospital medicine PN 9/14     H&P    Medication      Treatment     x Other Pt reports decreased appetite. Tooth/teeth missing RD consult 9/14     AND / ASPEN Clinical Characteristics (October 2011)  A minimum of two characteristics is recommended for diagnosing either moderate or severe malnutrition   Mild Malnutrition Moderate Malnutrition Severe Malnutrition   Energy Intake from p.o., TF or TPN. < 75% intake of  estimated energy needs for less than 7 days < 75% intake of estimated energy needs for greater than 7 days < 50% intake of estimated energy needs for > 5 days   Weight Loss 1-2% in 1 month  5% in 3 months  7.5% in 6 months  10% in 1 year 1-2 % in 1 week  5% in 1 month  7.5% in 3 months  10% in 6 months  20% in 1 year > 2% in 1 week  > 5% in 1 month  > 7.5% in 3 months  > 10% in 6 months  > 20% in 1 year   Physical Findings     None *Mild subcutaneous fat and/or muscle loss  *Mild fluid accumulation  *Stage II decubitus  *Surgical wound or non-healing wound *Mod/severe subcutaneous fat and/or muscle loss  *Mod/severe fluid accumulation  *Stage III or IV decubitus  *Non-healing surgical wound     Provider, please specify diagnosis or diagnoses associated with above clinical findings.    [X] Mild Protein-Calorie Malnutrition  [ ] Moderate Protein-Calorie Malnutrition  [ ] Cachexia  [ ] Anorexia  [ ] Other Nutritional Diagnosis (please specify): ____________________________________  [ ] Other: ________________________________  [ ] Clinically Undetermined    Please document in your progress notes daily for the duration of treatment until resolved and include in your discharge summary.

## 2018-09-16 NOTE — PHYSICIAN QUERY
PT Name: Sheryl Martines  MR #: [id number]     Physician Query Form - Documentation Clarification      CDS/: Brie Estevez RN               Contact information:[email or web address]    This form is a permanent document in the medical record.     Query Date: September 16, 2018    By submitting this query, we are merely seeking further clarification of documentation. Please utilize your independent clinical judgment when addressing the question(s) below.    The Medical record reflects the following:    Supporting Clinical Findings Location in Medical Record   Postoperative changes of recent appendectomy with increased inflammatory changes in the right lower abdominal quadrant.  Interval dehiscence of the right lateral abdominal wall with ill-defined fluid from the right lower quadrant appear to tract into the right abdominal wall defect.  Clinical correlation and surgical consultation is recommended.  Stable appearance of a 7 cm ill-defined soft tissue collection in the right hemipelvis.  The findings most likely represent a resolving hematoma.    Imp: Dehydration, resolving pelvic hematoma, possible fascial dehiscence   Plan: d/c po flagyl, , all skin staples out , leave open to air , will observe for now to continue hydration and pt /ot    MRSA wound infection, UTI, EVARISTO, DM,         Sepsis, DM History of stroke in 2012 with right sided residual. Pt Reported increased drainage. Tenderness mostly at right lower quadrant where she has the incision.   No drainage, no erythema, packing intact, dry.    recent admission for appendicitis and CDiff. The symptoms prompting her return include malaise, fever and drainage from appendectomy site   Abdominal CT 9/10                    General surgery consult 9/11        Hospital medicine PN 9/14      H&P          Infectious Disease note 9/10                                                                                      Doctor, Please specify diagnosis or  diagnoses associated with above clinical findings.    Provider Use Only      [ x  ] Post operative appendectomy fascial wound dehiscence ruled in    [   ] Post operative appendectomy resolving pelvic hematoma ruled in    [   ] Other post operative appendectomy wound ________(please specify)    [ x  ] Other __previous diagnosis of C-diff, on flagyl and vancomycin with nausea and vomiting related probably to flagyl.  This was discontinued and she was continued on oral vancomycin._____________________________                                                                                                               [  ] Clinically undetermined

## 2018-09-16 NOTE — PT/OT/SLP PROGRESS
Physical Therapy Treatment    Patient Name:  Sheryl Martines   MRN:  [id number]    Recommendations:     Discharge Recommendations:  nursing facility, skilled   Discharge Equipment Recommendations: walker, rolling   Barriers to discharge: decreased mobility and endurance    Assessment:     Sheryl Martines is a 63 y.o. female admitted with a medical diagnosis of Decreased oral intake.  She presents with the following impairments/functional limitations:  weakness, impaired endurance, impaired functional mobilty, gait instability, impaired balance, impaired cardiopulmonary response to activity, edema, decreased safety awareness,pt agreeable to up in chair and requires Min/Mod A with transfer using RW,pt will benefit from SNF upon discharge to increase functional independence.    Rehab Prognosis:  Good; patient would benefit from acute skilled PT services to address these deficits and reach maximum level of function.      Recent Surgery: * No surgery found *      Plan:     During this hospitalization, patient to be seen 6 x/week to address the above listed problems via gait training, therapeutic activities, therapeutic exercises  · Plan of Care Expires:  10/11/18   Plan of Care Reviewed with: patient    Subjective     Communicated with nsg prior to session.  Patient found supine upon PT entry to room, agreeable to treatment.      Chief Complaint: n/a  Patient comments/goals: pt states she was in bed all day yesterday  Pain/Comfort:  · Pain Rating 1: (no c/o's)    Patients cultural, spiritual, Congregation conflicts given the current situation:      Objective:     Patient found with: bed alarm, oxygen, telemetry     General Precautions: Standard, fall   Orthopedic Precautions:N/A   Braces: N/A     Functional Mobility:  · Bed Mobility:     · Supine to Sit: contact guard assistance  · Transfers:     · Sit to Stand:  contact guard assistance and minimum assistance with rolling walker  · Bed to Chair: minimum assistance and  moderate assistance with  rolling walker  using  Step Transfer  · Balance: requires RW with standing balance      AM-PAC 6 CLICK MOBILITY  Turning over in bed (including adjusting bedclothes, sheets and blankets)?: 3  Sitting down on and standing up from a chair with arms (e.g., wheelchair, bedside commode, etc.): 3  Moving from lying on back to sitting on the side of the bed?: 3  Moving to and from a bed to a chair (including a wheelchair)?: 3  Need to walk in hospital room?: 2  Climbing 3-5 steps with a railing?: 1  Basic Mobility Total Score: 15       Therapeutic Activities and Exercises: le supine ex's X 10-12 reps inc: ap,qs,abd/ad,hs,slr       Patient left up in chair with all lines intact, call button in reach, chair alarm on and nsg notified..    GOALS: see general POC  Multidisciplinary Problems     Physical Therapy Goals     Not on file          Multidisciplinary Problems (Resolved)        Problem: Physical Therapy Goal    Goal Priority Disciplines Outcome Goal Variances Interventions   Physical Therapy Goal   (Resolved)     PT, PT/OT Outcome(s) achieved     Description:  Goals to be met by: 10/11/2018     Patient will increase functional independence with mobility by performin. Supine to sit with Stand-by Assistance  2. Sit to stand transfer with Stand-by Assistance  3. Bed to chair transfer with Stand-by Assistance using Rolling Walker  4. Gait  x 12 feet with Stand-by Assistance using Rolling Walker.                       Time Tracking:     PT Received On: 18  PT Start Time: 925     PT Stop Time: 50  PT Total Time (min): 25 min     Billable Minutes: Therapeutic Activity 14 and Therapeutic Exercise 11    Treatment Type: Treatment  PT/PTA: PTA     PTA Visit Number: 1     Paul Sung, PTA  2018

## 2018-09-16 NOTE — PLAN OF CARE
Problem: Physical Therapy Goal  Goal: Physical Therapy Goal  Goals to be met by: 10/11/2018     Patient will increase functional independence with mobility by performin. Supine to sit with Stand-by Assistance  2. Sit to stand transfer with Stand-by Assistance  3. Bed to chair transfer with Stand-by Assistance using Rolling Walker  4. Gait  x 12 feet with Stand-by Assistance using Rolling Walker.      Outcome: Outcome(s) achieved Date Met: 18  Goals ongoing

## 2018-09-16 NOTE — PROGRESS NOTES
"Surgery follow up  BP (!) 145/78 (Patient Position: Lying)   Pulse 94   Temp 98.9 °F (37.2 °C)   Resp 18   Ht 5' 1" (1.549 m)   Wt 82.8 kg (182 lb 8.7 oz)   LMP  (LMP Unknown)   SpO2 96%   Breastfeeding? No   BMI 34.49 kg/m²   I/O last 3 completed shifts:  In: -   Out: 3700 [Urine:3700]  No intake/output data recorded.      Recent Results (from the past 336 hour(s))   CBC with Automated Differential    Collection Time: 09/15/18  5:03 AM   Result Value Ref Range    WBC 7.81 3.90 - 12.70 K/uL    Hemoglobin 9.8 (L) 12.0 - 16.0 g/dL    Hematocrit 32.2 (L) 37.0 - 48.5 %    Platelets 220 150 - 350 K/uL   CBC with Automated Differential    Collection Time: 09/14/18  3:53 AM   Result Value Ref Range    WBC 9.38 3.90 - 12.70 K/uL    Hemoglobin 9.5 (L) 12.0 - 16.0 g/dL    Hematocrit 30.6 (L) 37.0 - 48.5 %    Platelets 248 150 - 350 K/uL   CBC auto differential    Collection Time: 09/13/18  8:36 PM   Result Value Ref Range    WBC 14.28 (H) 3.90 - 12.70 K/uL    Hemoglobin 9.6 (L) 12.0 - 16.0 g/dL    Hematocrit 30.8 (L) 37.0 - 48.5 %    Platelets 251 150 - 350 K/uL     No results found for this or any previous visit (from the past 336 hour(s)).  Abdomen soft , tolerated diet well. Continue present treatment.  "

## 2018-09-16 NOTE — PROGRESS NOTES
"Surgery follow up  BP (!) 151/70 (BP Location: Right arm, Patient Position: Lying)   Pulse 77   Temp 98.7 °F (37.1 °C) (Oral)   Resp 14   Ht 5' 1" (1.549 m)   Wt 82.8 kg (182 lb 8.7 oz)   LMP  (LMP Unknown)   SpO2 96%   Breastfeeding? No   BMI 34.49 kg/m²   I/O last 3 completed shifts:  In: 1700 [P.O.:500; IV Piggyback:1200]  Out: 3000 [Urine:3000]  No intake/output data recorded.  Abdomen soft , some staples from abdomen removed,  Eating and moving bowel well.  "

## 2018-09-16 NOTE — PHYSICIAN QUERY
PT Name: Sheryl Martines  MR #: [id number]    Physician Query Form - Respiratory Condition Clarification      CDS/: Brie Estevez RN               Contact information: [email or web address]    This form is a permanent document in the medical record.    Query Date: 2018    By submitting this query, we are merely seeking further clarification of documentation. Please utilize your independent clinical judgment when addressing the question(s) below.    The Medical record contains the following   Indicators   Supporting Clinical Findings Location in Medical Record   x   SOB, RUIZ, Wheezing, Productive Cough, Use of Accessory Muscles, etc. Pt resp effort has increased  Using more accessory muscles       Assessment performed noted patient appears to be breathing hard with using her accessory muscles. Patient states she have not had a treatment today.    continued abdominal breathing    Nursing note       Nursing note           Hospital medicine PN    x   Acute/Chronic Illness MRSA wound infection, UTI, EVARISTO, DM,       Sepsis, DM History of stroke in  with right sided residual Hospital medicine PN       H&P   x   Radiology Findings FINDINGS:  No acute lobar consolidations or pneumothorax or pulmonary vascular congestion or pleural effusion.  Heart size is within normal limits    Impression     No evidence of pulmonary embolus.  Bibasilar atelectasis, similar to prior.    CXR           Chest CTA    x   Respiratory Distress or Failure The day after admission, she was noted to be in some respiratory distress, have a low sat, and low BP and was transferred to the ICU     She is in respiratory distress. Infectious disease consult         Infectious disease PN    x   Hypoxia or Hypercapnia After admission she was also noted to have SOB, hypoxia and hypotension   Infectious disease note    x   RR         ABGs         O2 sat AB.4/2/48/48/31/7 with sat of 83.      RR: 45  RR 31  RR 46  O2 sat 89@  RR 62  RR 50  RR 46  RR 59 Significant twan per Hospital Medicine 9/11  Vital signs 9/14@0116  Vital signs 9/14@0030  Vital signs 9/13 @2215  Vital signs 9/ 13@2045  Vital signs 9/12 @2322  Vital signs 9/12 @2045  Vital signs 9/11 @1316      BiPAP/Intubation         x   Supplemental O2 Pt encouraged to wear ordered CPAP x2. Pt refused x2. Pt is beginning to become labored and stated she does not want to wear CPAP.     Again patient is doing Abdominal  Breathing and refusing her CPAP at night as she  Did on last night.    Refusing CPAP at night. RRT PN 9/12        Nursing note 9/13        Hospital medicine PN 9/14      Home O2, Oxygen Dependence        Treatment        Other         Provider, please specify diagnosis or diagnoses associated with above clinical findings.     [    ] Acute Respiratory Failure with Hypoxia   [    ] Acute Respiratory Failure with Hypercapnia  [    ] Acute Respiratory Failure with Hypoxia and Hypercapnia  [    ] Other Acute Respiratory Failure      [X] Acute and (on) Chronic Respiratory Failure with Hypoxia   [    ] Acute and (on) Chronic Respiratory Failure with Hypercapnia   [    ] Acute and (on) Chronic Respiratory Failure with Hypoxia and Hypercapnia   [    ] Other Acute and (on) Chronic Respiratory Failure       [    ] Other Respiratory Diagnosis (please specify): _________________________________  [    ] Clinically Undetermined    Please document in your progress notes daily for the duration of treatment until resolved and include in your discharge summary.

## 2018-09-16 NOTE — PROGRESS NOTES
Progress Note  [facility]  Admit Date: 9/10/2018   LOS: 5 days   SUBJECTIVE:     NAEON. Eating small amounts, calorie count in process, but not being done.  Pt wishes to get up.  No diarrhea.  Having BMs, no emesis.  Awaiting placmeent    ROS   See above    OBJECTIVE:   Vital Signs (Most Recent)  Temp: 98.4 °F (36.9 °C) (09/16/18 0822)  Pulse: 87 (09/16/18 0822)  Resp: 17 (09/16/18 0822)  BP: (!) 184/81 (09/16/18 0822)  SpO2: 96 % (09/16/18 0822)    I & O (Last 24H):    Intake/Output Summary (Last 24 hours) at 9/16/2018 0930  Last data filed at 9/16/2018 0700  Gross per 24 hour   Intake 1700 ml   Output 1700 ml   Net 0 ml     Wt Readings from Last 3 Encounters:   09/14/18 82.8 kg (182 lb 8.7 oz)   09/05/18 88.9 kg (195 lb 15.8 oz)   08/14/18 81.6 kg (180 lb)       Current Diet Order   Procedures    Diet diabetic [facility]; 2000 Calorie; Low Sodium,2gm, Cardiac (Low Na/Chol)     Order Specific Question:   Indicate patient location for additional diet options:     Answer:   [facility]     Order Specific Question:   Total calories:     Answer:   2000 Calorie     Order Specific Question:   Additional Diet Options:     Answer:   Low Sodium,2gm     Order Specific Question:   Additional Diet Options:     Answer:   Cardiac (Low Na/Chol)        Physical Exam   Constitutional: She is oriented to person, place, and time. No distress.   fatigued   HENT:   Head: Normocephalic.   Eyes: Conjunctivae are normal.   Neck: Normal range of motion.   Cardiovascular: Normal rate, regular rhythm and normal heart sounds.   No murmur heard.  Pulmonary/Chest: Breath sounds normal. No respiratory distress. She has no wheezes. She has no rales.   Port in place for IV sticks though does not flush/pull back, no skin changes, no tenderness; continued abdominal breathing   Abdominal: Soft. Bowel sounds are normal. There is no tenderness (diffusely). There is no rebound.   Staples in place over umbilicus (removed from wound),  bandage in place, TTP, no erythema   Musculoskeletal: Normal range of motion. She exhibits no edema.   Neurological: She is alert and oriented to person, place, and time.   Skin: Skin is warm and dry. She is not diaphoretic. No erythema.   Psychiatric:   Flat affect, perks up with family   Nursing note and vitals reviewed.    Laboratory Data:  CBC  Recent Labs   Lab  09/14/18   0353  09/15/18   0503  09/16/18   0500   WBC  9.38  7.81  7.29   RBC  3.68*  3.81*  3.84*   HGB  9.5*  9.8*  10.0*   HCT  30.6*  32.2*  32.5*   PLT  248  220  197   MCV  83  85  85   MCH  25.8*  25.7*  26.0*   MCHC  31.0*  30.4*  30.8*     CMP  Recent Labs   Lab  09/14/18   0353  09/15/18   0503  09/16/18   0500   CALCIUM  8.8  8.7  9.1   PROT  5.8*  5.5*  5.4*   NA  138  141  138   K  3.6  3.6  3.7   CO2  35*  35*  35*   CL  99  99  97   BUN  5*  6*  7*   CREATININE  0.9  0.8  0.8   ALKPHOS  102  101  100   ALT  10  12  10   AST  16  16  15   BILITOT  1.4*  0.4  0.4     POCT-Glucose  POCT Glucose   Date Value Ref Range Status   09/16/2018 98 70 - 110 mg/dL Final   09/15/2018 150 (H) 70 - 110 mg/dL Final   09/15/2018 128 (H) 70 - 110 mg/dL Final   09/15/2018 126 (H) 70 - 110 mg/dL Final   09/15/2018 132 (H) 70 - 110 mg/dL Final   09/14/2018 111 (H) 70 - 110 mg/dL Final   09/14/2018 118 (H) 70 - 110 mg/dL Final   09/14/2018 119 (H) 70 - 110 mg/dL Final   09/14/2018 160 (H) 70 - 110 mg/dL Final   09/13/2018 137 (H) 70 - 110 mg/dL Final   09/13/2018 152 (H) 70 - 110 mg/dL Final   09/13/2018 163 (H) 70 - 110 mg/dL Final     COAGS  Recent Labs   Lab  09/10/18   1938   INR  1.1   APTT  28.7     MICRO  Microbiology Results (last 7 days)     Procedure Component Value Units Date/Time    Blood culture [[id number]] Collected:  09/11/18 1249    Order Status:  Completed Specimen:  Blood Updated:  09/15/18 1812     Blood Culture, Routine No Growth to date     Blood Culture, Routine No Growth to date     Blood Culture, Routine No Growth to date     Blood  Culture, Routine No Growth to date     Blood Culture, Routine No Growth to date    Blood culture [[id number]] Collected:  09/11/18 1248    Order Status:  Completed Specimen:  Blood Updated:  09/15/18 1812     Blood Culture, Routine No Growth to date     Blood Culture, Routine No Growth to date     Blood Culture, Routine No Growth to date     Blood Culture, Routine No Growth to date     Blood Culture, Routine No Growth to date    Urine culture [[id number]] Collected:  09/11/18 2152    Order Status:  Completed Specimen:  Urine, Catheterized Updated:  09/13/18 0221     Urine Culture, Routine No growth    Aerobic culture (Specify Source) **CANNOT BE ORDERED AS STAT** [[id number]]  (Susceptibility) Collected:  09/10/18 1817    Order Status:  Completed Specimen:  Wound from Abdomen Updated:  09/12/18 1117     Aerobic Bacterial Culture --     METHICILLIN RESISTANT STAPHYLOCOCCUS AUREUS  Moderate  Skin david also present      Clostridium difficile EIA [[id number]] Collected:  09/11/18 1928    Order Status:  Completed Specimen:  Stool Updated:  09/11/18 2008     C. diff Antigen Negative     C difficile Toxins A+B, EIA Negative     Comment: Testing not recommended for children <24 months old.       Aerobic culture [[id number]]     Order Status:  No result Specimen:  Catheter Tip     Culture, Anaerobe [[id number]]     Order Status:  No result Specimen:  Catheter Tip     Fungus culture [[id number]]     Order Status:  No result Specimen:  Catheter Tip         LIPID PANEL  Lab Results   Component Value Date    CHOL 159 08/23/2018     Lab Results   Component Value Date    HDL 35 (L) 08/23/2018     Lab Results   Component Value Date    LDLCALC 86.6 08/23/2018     Lab Results   Component Value Date    TRIG 187 (H) 08/23/2018     Lab Results   Component Value Date    CHOLHDL 22.0 08/23/2018       Diagnostic Results:  Imaging in last 24 hours:     ASSESSMENT/PLAN:   Sheryl Martines is a 63 y.o. 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     UTI  S/p 3 days vanc, zosyn  Cultures no growth    C diff  Completed course of PO vanc  Recent test neg  Bowel movements slowed    Iron deficient anemia  2/p transfusion 2 units 9/13, H/H responded appropriately  No signs of active bleed  Consider supplementation with iron    Elevated Total bili  TB 1.4- normalized to 0.4  US showed hepatic steatosis and cholelithiasis     Failure to thrive  Low PO intake, started megace  Calorie count in process  Nutrition consult  Boost-glucose with all meals    Diabetes  Determir increased to 15 BID, sliding scale  Glucoses well controlled  Monitoring and holding as needed due to low PO intake    GI ppx: protonix  VTE prophylaxis: lovenox  PT/OT/IS/ambulate    Dispo:  Working on SNF placement but will need IV abx.  Need to ensure SNF can provode ertapenem and vanc    9/16/2018 Julian Carmichael MD  9:30 AM

## 2018-09-16 NOTE — NURSING
PPD test placed to RFA on 9/14/18 at 1647  PPD read on 9/16/18 at 1700. Negative reading/ Zero induration

## 2018-09-16 NOTE — PLAN OF CARE
Problem: Patient Care Overview  Goal: Plan of Care Review  Outcome: Ongoing (interventions implemented as appropriate)  Pressure injury protocol continues to be maintained. Sacral healing well. No falls or injuries noted throughout shift. Fall risk protocol continues to be monitored. No signs and symptoms of infection noted. Abdominal incisions healing well. Dr. Melendrez removed umbilical staples. Incision SURESH. DM II continues to be managed via accu checks, diet and medication administration. Telemetry Update: No red alarms noted throughout shift.  Will continue to monitor pt and intervene PRN.

## 2018-09-17 LAB
ALBUMIN SERPL BCP-MCNC: 2.4 G/DL
ALP SERPL-CCNC: 99 U/L
ALT SERPL W/O P-5'-P-CCNC: 10 U/L
ANION GAP SERPL CALC-SCNC: 5 MMOL/L
AST SERPL-CCNC: 14 U/L
BASOPHILS # BLD AUTO: 0 K/UL
BASOPHILS NFR BLD: 0 %
BILIRUB SERPL-MCNC: 0.4 MG/DL
BLD PROD TYP BPU: NORMAL
BLD PROD TYP BPU: NORMAL
BLOOD UNIT EXPIRATION DATE: NORMAL
BLOOD UNIT EXPIRATION DATE: NORMAL
BLOOD UNIT TYPE CODE: 600
BLOOD UNIT TYPE CODE: 600
BLOOD UNIT TYPE: NORMAL
BLOOD UNIT TYPE: NORMAL
BUN SERPL-MCNC: 7 MG/DL
CALCIUM SERPL-MCNC: 8.8 MG/DL
CHLORIDE SERPL-SCNC: 98 MMOL/L
CO2 SERPL-SCNC: 36 MMOL/L
CODING SYSTEM: NORMAL
CODING SYSTEM: NORMAL
CREAT SERPL-MCNC: 0.8 MG/DL
DIFFERENTIAL METHOD: ABNORMAL
DISPENSE STATUS: NORMAL
DISPENSE STATUS: NORMAL
EOSINOPHIL # BLD AUTO: 0 K/UL
EOSINOPHIL NFR BLD: 0.5 %
ERYTHROCYTE [DISTWIDTH] IN BLOOD BY AUTOMATED COUNT: 19.3 %
EST. GFR  (AFRICAN AMERICAN): >60 ML/MIN/1.73 M^2
EST. GFR  (NON AFRICAN AMERICAN): >60 ML/MIN/1.73 M^2
GLUCOSE SERPL-MCNC: 83 MG/DL
HCT VFR BLD AUTO: 32.8 %
HGB BLD-MCNC: 10 G/DL
LYMPHOCYTES # BLD AUTO: 0.9 K/UL
LYMPHOCYTES NFR BLD: 14.5 %
MAGNESIUM SERPL-MCNC: 1.4 MG/DL
MCH RBC QN AUTO: 26.3 PG
MCHC RBC AUTO-ENTMCNC: 30.5 G/DL
MCV RBC AUTO: 86 FL
MONOCYTES # BLD AUTO: 0.5 K/UL
MONOCYTES NFR BLD: 7.7 %
NEUTROPHILS # BLD AUTO: 4.6 K/UL
NEUTROPHILS NFR BLD: 76.8 %
PHOSPHATE SERPL-MCNC: 3.2 MG/DL
PLATELET # BLD AUTO: 196 K/UL
PMV BLD AUTO: 8.7 FL
POCT GLUCOSE: 101 MG/DL (ref 70–110)
POCT GLUCOSE: 120 MG/DL (ref 70–110)
POCT GLUCOSE: 136 MG/DL (ref 70–110)
POCT GLUCOSE: 88 MG/DL (ref 70–110)
POTASSIUM SERPL-SCNC: 3.6 MMOL/L
PROT SERPL-MCNC: 5.4 G/DL
RBC # BLD AUTO: 3.8 M/UL
SODIUM SERPL-SCNC: 139 MMOL/L
TRANS ERYTHROCYTES VOL PATIENT: NORMAL ML
TRANS ERYTHROCYTES VOL PATIENT: NORMAL ML
WBC # BLD AUTO: 5.94 K/UL

## 2018-09-17 PROCEDURE — 63600175 PHARM REV CODE 636 W HCPCS: Performed by: STUDENT IN AN ORGANIZED HEALTH CARE EDUCATION/TRAINING PROGRAM

## 2018-09-17 PROCEDURE — 63600175 PHARM REV CODE 636 W HCPCS: Performed by: INTERNAL MEDICINE

## 2018-09-17 PROCEDURE — 27000221 HC OXYGEN, UP TO 24 HOURS

## 2018-09-17 PROCEDURE — 94761 N-INVAS EAR/PLS OXIMETRY MLT: CPT

## 2018-09-17 PROCEDURE — 94640 AIRWAY INHALATION TREATMENT: CPT

## 2018-09-17 PROCEDURE — 83735 ASSAY OF MAGNESIUM: CPT

## 2018-09-17 PROCEDURE — 25000003 PHARM REV CODE 250: Performed by: STUDENT IN AN ORGANIZED HEALTH CARE EDUCATION/TRAINING PROGRAM

## 2018-09-17 PROCEDURE — 84100 ASSAY OF PHOSPHORUS: CPT

## 2018-09-17 PROCEDURE — 85025 COMPLETE CBC W/AUTO DIFF WBC: CPT

## 2018-09-17 PROCEDURE — 97110 THERAPEUTIC EXERCISES: CPT

## 2018-09-17 PROCEDURE — 97535 SELF CARE MNGMENT TRAINING: CPT

## 2018-09-17 PROCEDURE — G8983 BODY POS D/C STATUS: HCPCS | Mod: CK

## 2018-09-17 PROCEDURE — 11000001 HC ACUTE MED/SURG PRIVATE ROOM

## 2018-09-17 PROCEDURE — 94799 UNLISTED PULMONARY SVC/PX: CPT

## 2018-09-17 PROCEDURE — 25000242 PHARM REV CODE 250 ALT 637 W/ HCPCS: Performed by: STUDENT IN AN ORGANIZED HEALTH CARE EDUCATION/TRAINING PROGRAM

## 2018-09-17 PROCEDURE — 97530 THERAPEUTIC ACTIVITIES: CPT

## 2018-09-17 PROCEDURE — 80053 COMPREHEN METABOLIC PANEL: CPT

## 2018-09-17 PROCEDURE — 25000003 PHARM REV CODE 250: Performed by: FAMILY MEDICINE

## 2018-09-17 PROCEDURE — 97803 MED NUTRITION INDIV SUBSEQ: CPT

## 2018-09-17 RX ORDER — LANOLIN ALCOHOL/MO/W.PET/CERES
400 CREAM (GRAM) TOPICAL
Status: COMPLETED | OUTPATIENT
Start: 2018-09-17 | End: 2018-09-17

## 2018-09-17 RX ORDER — METHOCARBAMOL 500 MG/1
1000 TABLET, FILM COATED ORAL 4 TIMES DAILY
Status: COMPLETED | OUTPATIENT
Start: 2018-09-18 | End: 2018-09-18

## 2018-09-17 RX ADMIN — IPRATROPIUM BROMIDE AND ALBUTEROL SULFATE 3 ML: .5; 3 SOLUTION RESPIRATORY (INHALATION) at 04:09

## 2018-09-17 RX ADMIN — MAGNESIUM OXIDE TAB 400 MG (241.3 MG ELEMENTAL MG) 400 MG: 400 (241.3 MG) TAB at 11:09

## 2018-09-17 RX ADMIN — ACETAMINOPHEN 650 MG: 325 TABLET, FILM COATED ORAL at 03:09

## 2018-09-17 RX ADMIN — PREDNISONE 10 MG: 10 TABLET ORAL at 09:09

## 2018-09-17 RX ADMIN — ACETAMINOPHEN 650 MG: 325 TABLET, FILM COATED ORAL at 09:09

## 2018-09-17 RX ADMIN — VANCOMYCIN HYDROCHLORIDE 750 MG: 750 INJECTION, POWDER, LYOPHILIZED, FOR SOLUTION INTRAVENOUS at 05:09

## 2018-09-17 RX ADMIN — MEGESTROL ACETATE 40 MG: 40 TABLET ORAL at 11:09

## 2018-09-17 RX ADMIN — IPRATROPIUM BROMIDE AND ALBUTEROL SULFATE 3 ML: .5; 3 SOLUTION RESPIRATORY (INHALATION) at 12:09

## 2018-09-17 RX ADMIN — LACTOBACILLUS TAB 4 TABLET: TAB at 09:09

## 2018-09-17 RX ADMIN — IPRATROPIUM BROMIDE AND ALBUTEROL SULFATE 3 ML: .5; 3 SOLUTION RESPIRATORY (INHALATION) at 03:09

## 2018-09-17 RX ADMIN — IPRATROPIUM BROMIDE AND ALBUTEROL SULFATE 3 ML: .5; 3 SOLUTION RESPIRATORY (INHALATION) at 07:09

## 2018-09-17 RX ADMIN — CLOPIDOGREL BISULFATE 75 MG: 75 TABLET ORAL at 09:09

## 2018-09-17 RX ADMIN — DILTIAZEM HYDROCHLORIDE 180 MG: 180 CAPSULE, COATED, EXTENDED RELEASE ORAL at 09:09

## 2018-09-17 RX ADMIN — MEGESTROL ACETATE 40 MG: 40 TABLET ORAL at 09:09

## 2018-09-17 RX ADMIN — ASPIRIN 81 MG: 81 TABLET, COATED ORAL at 09:09

## 2018-09-17 RX ADMIN — PRAZOSIN HYDROCHLORIDE 5 MG: 1 CAPSULE ORAL at 09:09

## 2018-09-17 RX ADMIN — DULOXETINE 60 MG: 30 CAPSULE, DELAYED RELEASE ORAL at 09:09

## 2018-09-17 RX ADMIN — LACTOBACILLUS TAB 4 TABLET: TAB at 11:09

## 2018-09-17 RX ADMIN — ENOXAPARIN SODIUM 40 MG: 100 INJECTION SUBCUTANEOUS at 05:09

## 2018-09-17 RX ADMIN — FUROSEMIDE 40 MG: 40 TABLET ORAL at 09:09

## 2018-09-17 RX ADMIN — LACTOBACILLUS TAB 4 TABLET: TAB at 05:09

## 2018-09-17 RX ADMIN — VANCOMYCIN HYDROCHLORIDE 750 MG: 750 INJECTION, POWDER, LYOPHILIZED, FOR SOLUTION INTRAVENOUS at 04:09

## 2018-09-17 RX ADMIN — MAGNESIUM OXIDE TAB 400 MG (241.3 MG ELEMENTAL MG) 400 MG: 400 (241.3 MG) TAB at 12:09

## 2018-09-17 RX ADMIN — PANTOPRAZOLE SODIUM 40 MG: 40 TABLET, DELAYED RELEASE ORAL at 09:09

## 2018-09-17 RX ADMIN — ERTAPENEM SODIUM 1 G: 1 INJECTION, POWDER, LYOPHILIZED, FOR SOLUTION INTRAMUSCULAR; INTRAVENOUS at 09:09

## 2018-09-17 RX ADMIN — LOSARTAN POTASSIUM 100 MG: 50 TABLET, FILM COATED ORAL at 09:09

## 2018-09-17 RX ADMIN — SIMVASTATIN 40 MG: 20 TABLET, FILM COATED ORAL at 09:09

## 2018-09-17 RX ADMIN — THEOPHYLLINE ANHYDROUS 200 MG: 100 CAPSULE, EXTENDED RELEASE ORAL at 09:09

## 2018-09-17 NOTE — PLAN OF CARE
TN went to meet with patient, nurse Juju also at bedside. No family at bedside. TN reviewed patient's clinicals. [facility] still working on SNF placement, pending insurance authorization as well. LTAC would be a back up, if SNF unable to accommodate patient. Will call patient's daughter to update her. TN will continue to follow.      09/17/18 1236   Discharge Reassessment   Assessment Type Discharge Planning Reassessment   Provided patient/caregiver education on the expected discharge date and the discharge plan Yes   Do you have any problems affording any of your prescribed medications? TBD   Discharge Plan A Skilled Nursing Facility   Discharge Plan B Long-term acute care facility (LTAC)   Can the patient answer the patient profile reliably? Yes, cognitively intact   Describe the patient's ability to walk at the present time. Walks with the help of equipment     Vicky Tran RN  Transition Navigator  [phone]

## 2018-09-17 NOTE — PROGRESS NOTES
Progress Note  [facility]  Admit Date: 9/10/2018   LOS: 6 days   SUBJECTIVE:     NAEON. Looks much better. EAting cereal.  Staples removed    ROS   See above    OBJECTIVE:   Vital Signs (Most Recent)  Temp: 96.1 °F (35.6 °C) (09/17/18 0330)  Pulse: 73 (09/17/18 0718)  Resp: 18 (09/17/18 0718)  BP: (!) 146/91 (09/17/18 0330)  SpO2: 100 % (09/17/18 0718)    I & O (Last 24H):    Intake/Output Summary (Last 24 hours) at 9/17/2018 0742  Last data filed at 9/17/2018 0430  Gross per 24 hour   Intake 1050 ml   Output --   Net 1050 ml     Wt Readings from Last 3 Encounters:   09/17/18 74 kg (163 lb 2.3 oz)   09/05/18 88.9 kg (195 lb 15.8 oz)   08/14/18 81.6 kg (180 lb)       Current Diet Order   Procedures    Diet diabetic [facility]; 2000 Calorie; Low Sodium,2gm, Cardiac (Low Na/Chol)     Order Specific Question:   Indicate patient location for additional diet options:     Answer:   [facility]     Order Specific Question:   Total calories:     Answer:   2000 Calorie     Order Specific Question:   Additional Diet Options:     Answer:   Low Sodium,2gm     Order Specific Question:   Additional Diet Options:     Answer:   Cardiac (Low Na/Chol)        Physical Exam   Constitutional: She is oriented to person, place, and time. No distress.   HENT:   Head: Normocephalic.   Eyes: Conjunctivae are normal.   Neck: Normal range of motion.   Cardiovascular: Normal rate, regular rhythm and normal heart sounds.   No murmur heard.  Pulmonary/Chest: Breath sounds normal. No respiratory distress. She has no wheezes. She has no rales.   Port in place for IV sticks though maybe does not flush/pull back, no skin changes, no tenderness; continued abdominal breathing   Abdominal: Soft. Bowel sounds are normal. There is no tenderness (diffusely). There is no rebound.   Staples in place over umbilicus (removed from wound), bandage in place, TTP, no erythema   Musculoskeletal: Normal range of motion. She exhibits no edema.    Neurological: She is alert and oriented to person, place, and time.   Skin: Skin is warm and dry. She is not diaphoretic. No erythema.   Psychiatric:   Flat affect, perks up with family, improved mood today   Nursing note and vitals reviewed.    Laboratory Data:  CBC  Recent Labs   Lab  09/15/18   0503  09/16/18   0500  09/17/18   0430   WBC  7.81  7.29  5.94   RBC  3.81*  3.84*  3.80*   HGB  9.8*  10.0*  10.0*   HCT  32.2*  32.5*  32.8*   PLT  220  197  196   MCV  85  85  86   MCH  25.7*  26.0*  26.3*   MCHC  30.4*  30.8*  30.5*     CMP  Recent Labs   Lab  09/15/18   0503  09/16/18   0500  09/17/18   0430   CALCIUM  8.7  9.1  8.8   PROT  5.5*  5.4*  5.4*   NA  141  138  139   K  3.6  3.7  3.6   CO2  35*  35*  36*   CL  99  97  98   BUN  6*  7*  7*   CREATININE  0.8  0.8  0.8   ALKPHOS  101  100  99   ALT  12  10  10   AST  16  15  14   BILITOT  0.4  0.4  0.4     POCT-Glucose  POCT Glucose   Date Value Ref Range Status   09/17/2018 136 (H) 70 - 110 mg/dL Final   09/16/2018 92 70 - 110 mg/dL Final   09/16/2018 87 70 - 110 mg/dL Final   09/16/2018 120 (H) 70 - 110 mg/dL Final   09/16/2018 98 70 - 110 mg/dL Final   09/15/2018 150 (H) 70 - 110 mg/dL Final   09/15/2018 128 (H) 70 - 110 mg/dL Final   09/15/2018 126 (H) 70 - 110 mg/dL Final   09/15/2018 132 (H) 70 - 110 mg/dL Final   09/14/2018 111 (H) 70 - 110 mg/dL Final   09/14/2018 118 (H) 70 - 110 mg/dL Final   09/14/2018 119 (H) 70 - 110 mg/dL Final     COAGS  Recent Labs   Lab  09/10/18   1938   INR  1.1   APTT  28.7     MICRO  Microbiology Results (last 7 days)     Procedure Component Value Units Date/Time    Blood culture [[id number]] Collected:  09/11/18 1249    Order Status:  Completed Specimen:  Blood Updated:  09/16/18 1812     Blood Culture, Routine No growth after 5 days.    Blood culture [[id number]] Collected:  09/11/18 1248    Order Status:  Completed Specimen:  Blood Updated:  09/16/18 1812     Blood Culture, Routine No growth after 5 days.    Urine  culture [[id number]] Collected:  09/11/18 2152    Order Status:  Completed Specimen:  Urine, Catheterized Updated:  09/13/18 0221     Urine Culture, Routine No growth    Aerobic culture (Specify Source) **CANNOT BE ORDERED AS STAT** [[id number]]  (Susceptibility) Collected:  09/10/18 1817    Order Status:  Completed Specimen:  Wound from Abdomen Updated:  09/12/18 1117     Aerobic Bacterial Culture --     METHICILLIN RESISTANT STAPHYLOCOCCUS AUREUS  Moderate  Skin david also present      Clostridium difficile EIA [[id number]] Collected:  09/11/18 1928    Order Status:  Completed Specimen:  Stool Updated:  09/11/18 2008     C. diff Antigen Negative     C difficile Toxins A+B, EIA Negative     Comment: Testing not recommended for children <24 months old.       Aerobic culture [[id number]]     Order Status:  No result Specimen:  Catheter Tip     Culture, Anaerobe [[id number]]     Order Status:  No result Specimen:  Catheter Tip     Fungus culture [[id number]]     Order Status:  No result Specimen:  Catheter Tip         LIPID PANEL  Lab Results   Component Value Date    CHOL 159 08/23/2018     Lab Results   Component Value Date    HDL 35 (L) 08/23/2018     Lab Results   Component Value Date    LDLCALC 86.6 08/23/2018     Lab Results   Component Value Date    TRIG 187 (H) 08/23/2018     Lab Results   Component Value Date    CHOLHDL 22.0 08/23/2018       Diagnostic Results:  Imaging in last 24 hours:     ASSESSMENT/PLAN:   Sheryl Martines is a 63 y.o. 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     UTI  S/p 3 days vanc, zosyn  Cultures no  growth    C diff- resolved  Completed course of PO vanc  Recent test neg    Iron deficient anemia  2/p transfusion 2 units 9/13, H/H responded appropriately  No signs of active bleed  Stable    Failure to thrive  Low PO intake, started megace  Calorie count in process  Nutrition consult  Boost-glucose with all meals    Diabetes  Determir decreased to 10 BID, sliding scale  Glucoses well controlled  Monitoring and holding as needed due to low PO intake    GI ppx: protonix  VTE prophylaxis: lovenox  PT/OT/IS/ambulate    Dispo:  Working on SNF placement but will need IV abx.     9/17/2018 Julian Carmichael MD  9:30 AM

## 2018-09-17 NOTE — PT/OT/SLP PROGRESS
Occupational Therapy   Treatment    Name: Sheryl Martines  MRN: [id number]  Admitting Diagnosis:  Decreased oral intake       Recommendations:     Discharge Recommendations: nursing facility, skilled  Discharge Equipment Recommendations:  walker, rolling  Barriers to discharge:  Decreased caregiver support    Subjective     Communicated with: nsg prior to session.  Pain/Comfort:  · Pain Rating 1: 0/10    Patients cultural, spiritual, Congregational conflicts given the current situation:      Objective:     Patient found with:      General Precautions: Standard, fall   Orthopedic Precautions:N/A   Braces: N/A     Occupational Performance:    Bed Mobility:    · Patient completed Scooting/Bridging with contact guard assistance  · Patient completed Supine to Sit with minimum assistance     Functional Mobility/Transfers:  · Patient completed Sit <> Stand Transfer with minimum assistance  with  rolling walker   · Patient completed Bed <> Chair Transfer using Step Transfer technique with minimum assistance with rolling walker  · Functional Mobility: Min A with RW; ~ 20 feet during session     Activities of Daily Living:  · Upper Body Dressing: contact guard assistance    · Lower Body Dressing: moderate assistance    · Toileting: maximal assistance bed pan    · Grooming Min A seated in b/s chair for combing hair/tangles     Patient left up in chair with all lines intact, call button in reach and nsg present    [facility] 6 Click:  [facility] Total Score: 16    Treatment & Education:  Pt performing skills as listed abov  Reporting need to use bed pan upon therapist's entrance into room; stated it was urgent   Pt requiring Total A to place bed pan; limited ability to bridge to place bedpan, requires rolling technique- pt unable to perform perianal hygiene post toileting    Min A bed mobility   Requires increased time between performance of axs  Mod A to tre socks while seated EOB   Min A standing from EOB and b/s chair with RW; tremulous  "and reports of dizziness throughout mobility in room; pt stating, " help me gisele. Save me gisele" while ambulating with Min A 2/2 multiple LOB   G&H performed while seated in b/s chair   Nursing notified that chair alarm malfunctioning and present in room at end of session   Standing attempt performed from b/s chair at end of session, however, ot unable to tolerate 2/2 dizziness and requires return to seated position   Education:    Assessment:     Sheryl Martinse is a 63 y.o. female with a medical diagnosis of Decreased oral intake.  She presents with deconditioning  .  Performance deficits affecting function are weakness, impaired self care skills, impaired balance, impaired functional mobilty, impaired endurance, gait instability, impaired cardiopulmonary response to activity, decreased safety awareness, decreased ROM, edema.  Pt remains with decreased ax tolerance. Dizziness reported throughout session with all movements and any OOB axs. Cont OT POC     Rehab Prognosis:  Good ; patient would benefit from acute skilled OT services to address these deficits and reach maximum level of function.       Plan:     Patient to be seen 5 x/week to address the above listed problems via self-care/home management, therapeutic activities, therapeutic exercises  · Plan of Care Expires: 10/13/18  · Plan of Care Reviewed with: patient    This Plan of care has been discussed with the patient who was involved in its development and understands and is in agreement with the identified goals and treatment plan    GOALS:   Multidisciplinary Problems     Occupational Therapy Goals        Problem: Occupational Therapy Goal    Goal Priority Disciplines Outcome Interventions   Occupational Therapy Goal     OT, PT/OT Ongoing (interventions implemented as appropriate)    Description:  Goals to be met by: 10/13/18     Patient will increase functional independence with ADLs by performing:    LE Dressing with Minimal Assistance.  Grooming " while seated with Stand-by Assistance.  Toileting from bedside commode with Minimal Assistance for hygiene and clothing management.   Supine to sit with Modified Indep.  Stand pivot transfers with Contact Guard Assistance.  Toilet transfer to bedside commode with Contact Guard Assistance.  Increased functional strength to WFL for self care skills and functional mobility.  Upper extremity exercise program x10 reps per handout, with independence.                       Time Tracking:     OT Date of Treatment: 09/17/18  OT Start Time: 1113  OT Stop Time: 1141  OT Total Time (min): 28 min    Billable Minutes:Self Care/Home Management 18  Therapeutic Activity 10    Vianey Bang OT  9/17/2018

## 2018-09-17 NOTE — PLAN OF CARE
Problem: Nutrition, Imbalanced: Inadequate Oral Intake (Adult)  Goal: Improved Oral Intake  Patient will demonstrate the desired outcomes by discharge/transition of care.  Outcome: Ongoing (interventions implemented as appropriate)  Recommendation/Intervention:   1. Day 1 calorie count= 200kcal & 7g protein. (pt was NPO for lunch)      Day 2 calorie count= 280 kcal & 12g protein.       Day 3 calorie count not completed. Notified RN to record today's po intake on calorie count sheet.   2. Continue to encourage intake at meals.   3. If intake does not improve, pt may benefit from supplemental enteral feeds.   4. Will follow up with Day 3 results.    Goals:   PO intake > 50% EEN and EPN by RD f/u   Nutrition Goal Status: progressing towards goal  Communication of RD Recs: reviewed with RN(Juju)

## 2018-09-17 NOTE — PROGRESS NOTES
[facility]  Infectious Disease  Progress Note    Patient Name: Sheryl Martines  MRN: [id number]  Admission Date: 9/10/2018  Length of Stay: 6 days  Attending Physician: Cassy Wick MD  Primary Care Provider: Primary Doctor No    Isolation Status: Contact  Assessment/Plan:      Sepsis due to other etiology    Ms Martines returned to hospital after a recent admission for appendicitis and CDiff. The symptoms prompting her return include malaise, fever and drainage from appendectomy site. After admission she was also noted to have SOB, hypoxia and hypotension. The CDiff cultures were negative and she is still on oral vanc. Her hypotension and fever are concerning for sepsis but the source is undetermined, with likely etiologies being the abdomen and pneumonia (Hx of hypoxia and SOB) although her chest imaging has not been impressive. Considering her abd pain, incision site drainage and imaging showing questionable fluid collection as well as multiple organisms from wound and intraabdominal cultures over these last two admissions, safer to assume that an intraabdominal process is responsible      Recs  - spoke with surgery, no procedures planned, will need to therefore have longer course of antibiotics for possible fluid collection  - will choose ertapenem and vanc to cover anerobes and enterococcus and gram negatives from last admission cultures, vanc will also cover MRSA  - aim for 4 weeks antibiotics after discharge with re imaging as outpatient 2 weeks post discharge, in the middle of the course to assess improvement in fluid collection     - since no bacteremia, if port can still be used, no objection from ID to using this as the route for IV outpatient antibiotics if possible  - needs weekly CMP, CBC, vanc levels and follow up with [facility] ID within 2 weeks of discharge preferably with the results of the imaging as mentioned above           Thank you for your consult. I will sign off.  Please contact us if you have any additional questions.    Virginia Gutierrez MD  Infectious Disease  [facility]    Subjective:     Principal Problem:Decreased oral intake    HPI: Sheryl Martines  is a 63 y.o. 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    She had a recent hospital admission 8/22 after a fall during which time she was noted to have appendicits on spine imaging and who underwent open appendectomy surgery on  08/26 with Dr. Melendrez. Subsequently her wound cultures grew EColi, Enterococcus, and MRSA and she was treated with cipro, flagyl and vancomycin. Also, CDiff returned positive on 8/29 and she was treated with oral vanc and metronidazole and discharged on this regimen with a tentative end date of 9/13.     Regarding this admission, she presents to the emergency department on 9/10 with increased drainage from the appendectomy incision site for 2 days and fever for 1 day (100.4). She also mentions that she actually came in to the ER, not for the drainage but instead for malaise. Did also have nausea and diarrhoea.    The day after admission, she was noted to be in some respiratory distress, have a low sat, and low BP and was transferred to the ICU. She never required pressors but was escalated to broad spec antibiotics.     At the bedside she also endorses chronic low back pain, abd pain and SOB, but no dysuria.            Interval History:     No diarrhoea    Review of Systems   Gastrointestinal: Positive for abdominal distention and abdominal pain.   All other systems reviewed and are negative.    Objective:     Vital Signs (Most Recent):  Temp: 98.3 °F (36.8 °C) (09/17/18 1640)  Pulse: 88 (09/17/18 1640)  Resp: 16 (09/17/18 1640)  BP: (!) 152/74 (09/17/18 1640)  SpO2: 100 % (09/17/18 1541) Vital Signs (24h Range):  Temp:  [96.1 °F (35.6 °C)-98.3 °F (36.8 °C)] 98.3 °F (36.8 °C)  Pulse:  [66-91] 88  Resp:  [14-20] 16  SpO2:  [99  %-100 %] 100 %  BP: (146-178)/(74-92) 152/74     Weight: 74 kg (163 lb 2.3 oz)  Body mass index is 30.83 kg/m².    Estimated Creatinine Clearance: 66.2 mL/min (based on SCr of 0.8 mg/dL).    Physical Exam   Constitutional: No distress.   Neck: No JVD present.   Cardiovascular: Normal rate and regular rhythm.   Pulmonary/Chest: Effort normal and breath sounds normal.   Abdominal: Soft. Bowel sounds are normal.   Musculoskeletal: She exhibits no edema.   Neurological: She is alert.   Skin: Skin is warm and dry. She is not diaphoretic.       Significant Labs:   BMP:   Recent Labs   Lab  09/17/18   0430   GLU  83   NA  139   K  3.6   CL  98   CO2  36*   BUN  7*   CREATININE  0.8   CALCIUM  8.8   MG  1.4*     CBC:   Recent Labs   Lab  09/16/18   0500  09/17/18   0430   WBC  7.29  5.94   HGB  10.0*  10.0*   HCT  32.5*  32.8*   PLT  197  196     CMP:   Recent Labs   Lab  09/16/18   0500  09/17/18   0430   NA  138  139   K  3.7  3.6   CL  97  98   CO2  35*  36*   GLU  81  83   BUN  7*  7*   CREATININE  0.8  0.8   CALCIUM  9.1  8.8   PROT  5.4*  5.4*   ALBUMIN  2.3*  2.4*   BILITOT  0.4  0.4   ALKPHOS  100  99   AST  15  14   ALT  10  10   ANIONGAP  6*  5*   EGFRNONAA  >60  >60       Significant Imaging: I have reviewed all pertinent imaging results/findings within the past 24 hours.

## 2018-09-17 NOTE — PT/OT/SLP PROGRESS
Physical Therapy Treatment    Patient Name:  Sheryl Martines   MRN:  [id number]    Recommendations:     Discharge Recommendations:  nursing facility, skilled   Discharge Equipment Recommendations: walker, rolling   Barriers to discharge: decreased mobility,strength and endurance    Assessment:     Sheryl Martines is a 63 y.o. female admitted with a medical diagnosis of Decreased oral intake.  She presents with the following impairments/functional limitations:  weakness, impaired endurance, impaired functional mobilty, decreased lower extremity function, decreased ROM,pt with increased fatigue and up in chair earlier today,pt required increased cues to stay alert,pt will benefit from SNF upon discharge.    Rehab Prognosis:  Good; patient would benefit from acute skilled PT services to address these deficits and reach maximum level of function.      Recent Surgery: * No surgery found *      Plan:     During this hospitalization, patient to be seen 6 x/week to address the above listed problems via gait training, therapeutic activities, therapeutic exercises  · Plan of Care Expires:  10/11/18   Plan of Care Reviewed with: patient    Subjective     Communicated with nsg prior to session.  Patient found supine upon PT entry to room, agreeable to treatment.      Chief Complaint: n/a  Patient comments/goals: pt agreeable to bed ex's  Pain/Comfort:  · Pain Rating 1: (no c/o's)    Patients cultural, spiritual, Voodoo conflicts given the current situation:      Objective:     Patient found with: bed alarm, oxygen, telemetry     General Precautions: Standard, fall   Orthopedic Precautions:N/A   Braces: N/A     Functional Mobility:  · Gait: n/a      AM-PAC 6 CLICK MOBILITY  Turning over in bed (including adjusting bedclothes, sheets and blankets)?: 3  Sitting down on and standing up from a chair with arms (e.g., wheelchair, bedside commode, etc.): 3  Moving from lying on back to sitting on the side of the bed?: 3  Moving to  and from a bed to a chair (including a wheelchair)?: 3  Need to walk in hospital room?: 2  Climbing 3-5 steps with a railing?: 1  Basic Mobility Total Score: 15       Therapeutic Activities and Exercises: le supine ex's X 10-12 reps inc: ap,qs,abd/add,hs,slr,with v/c's and CGA       Patient left supine with all lines intact, call button in reach and bed alarm on..    GOALS: see general POC  Multidisciplinary Problems     Physical Therapy Goals     Not on file          Multidisciplinary Problems (Resolved)        Problem: Physical Therapy Goal    Goal Priority Disciplines Outcome Goal Variances Interventions   Physical Therapy Goal   (Resolved)     PT, PT/OT Outcome(s) achieved     Description:  Goals to be met by: 10/11/2018     Patient will increase functional independence with mobility by performin. Supine to sit with Stand-by Assistance  2. Sit to stand transfer with Stand-by Assistance  3. Bed to chair transfer with Stand-by Assistance using Rolling Walker  4. Gait  x 12 feet with Stand-by Assistance using Rolling Walker.                       Time Tracking:     PT Received On: 18  PT Start Time: 1408     PT Stop Time: 1420  PT Total Time (min): 12 min     Billable Minutes: Therapeutic Exercise 12    Treatment Type: Treatment  PT/PTA: PTA     PTA Visit Number: 2     Paul Sung, PTA  2018

## 2018-09-17 NOTE — PLAN OF CARE
Problem: Occupational Therapy Goal  Goal: Occupational Therapy Goal  Goals to be met by: 10/13/18     Patient will increase functional independence with ADLs by performing:    LE Dressing with Minimal Assistance.  Grooming while seated with Stand-by Assistance.  Toileting from bedside commode with Minimal Assistance for hygiene and clothing management.   Supine to sit with Modified Indep.  Stand pivot transfers with Contact Guard Assistance.  Toilet transfer to bedside commode with Contact Guard Assistance.  Increased functional strength to WFL for self care skills and functional mobility.  Upper extremity exercise program x10 reps per handout, with independence.      Outcome: Ongoing (interventions implemented as appropriate)  Pt remains with decreased ax tolerance. Dizziness reported throughout session with all movements and any OOB axs. Cont OT POC

## 2018-09-17 NOTE — PROGRESS NOTES
"Surgery Follow up  BP (!) 152/74   Pulse 88   Temp 98.3 °F (36.8 °C)   Resp 16   Ht 5' 1" (1.549 m)   Wt 74 kg (163 lb 2.3 oz)   LMP  (LMP Unknown)   SpO2 100%   Breastfeeding? No   BMI 30.83 kg/m²   I/O last 3 completed shifts:  In: 2000 [P.O.:950; IV Piggyback:1050]  Out: 900 [Urine:900]  I/O this shift:  In: 225 [P.O.:225]  Out: 622 [Urine:622]  No results found for this or any previous visit (from the past 336 hour(s)).  Recent Results (from the past 336 hour(s))   CBC with Automated Differential    Collection Time: 09/17/18  4:30 AM   Result Value Ref Range    WBC 5.94 3.90 - 12.70 K/uL    Hemoglobin 10.0 (L) 12.0 - 16.0 g/dL    Hematocrit 32.8 (L) 37.0 - 48.5 %    Platelets 196 150 - 350 K/uL   CBC with Automated Differential    Collection Time: 09/16/18  5:00 AM   Result Value Ref Range    WBC 7.29 3.90 - 12.70 K/uL    Hemoglobin 10.0 (L) 12.0 - 16.0 g/dL    Hematocrit 32.5 (L) 37.0 - 48.5 %    Platelets 197 150 - 350 K/uL   CBC with Automated Differential    Collection Time: 09/15/18  5:03 AM   Result Value Ref Range    WBC 7.81 3.90 - 12.70 K/uL    Hemoglobin 9.8 (L) 12.0 - 16.0 g/dL    Hematocrit 32.2 (L) 37.0 - 48.5 %    Platelets 220 150 - 350 K/uL   Abdomen soft non tender , wound ok  Will try to use right chest port.Patient claims it has been used.    "

## 2018-09-17 NOTE — PLAN OF CARE
Problem: Patient Care Overview  Goal: Plan of Care Review  Outcome: Outcome(s) achieved Date Met: 09/17/18  Patient is awake, alert and oriented. Care plan explained and verbalized understanding. On oxygen 2lpm/nasal cannula, oxygen saturation at 99%. On heart monitoring running Sinus Rhythm at 66bpm. Triple lumen IJ catheter; clean, dry and intact. Flushed with blood return. Right chest deandre catheter not accessed. Right forearm with Tb skin test, 48 hour reading was negative.  Right lower abdomen with dressing; dry, clean and intact. Complained of tolerable abdominal incision pain, pain medications offered but refused. Sacral wound healed. Blood sugar monitored and covered per insulin sliding scale. On fall precautions, non-skid socks on. CPAP refused. Bed in lowest position, bed alarms on, call light within reach and instructed to call for help.

## 2018-09-17 NOTE — PROGRESS NOTES
" [facility]  Adult Nutrition  Progress Note    SUMMARY       Recommendations    Recommendation/Intervention:   1. Day 1 calorie count= 200kcal & 7g protein. (pt was NPO for lunch)      Day 2 calorie count= 280 kcal & 12g protein.       Day 3 calorie count not completed. Notified RN to record today's po intake on calorie count sheet.   2. Continue to encourage intake at meals.   3. If intake does not improve, pt may benefit from supplemental enteral feeds.   4. Will follow up with Day 3 results.    Goals:   PO intake > 50% EEN and EPN by RD f/u   Nutrition Goal Status: progressing towards goal  Communication of RD Recs: reviewed with RN(Juju)    Reason for Assessment  Reason for Assessment: RD follow-up  Diagnosis: other (see comments)(decreased oral intake )  Relevant Medical History: CAD, DM, HTN, Stroke, COPD, high cholesterol, pulmonary embolus   Interdisciplinary Rounds: did not attend  General Information Comments: Day 1 calorie count= 200 kcal & 7g protein (pt was NPO for lunch meal) Day 2 calorie count= 280 kcal & 12g protein. Day 3 calorie count not filled out. Notifed RN to record po intake today on calorie count sheet. Pt on 2000 aubrey ADA Cardiac low Na diet with Boost Glucose.   Nutrition Discharge Planning: d/c on DM 2000 kcal + cardiac diet     Nutrition Risk Screen  Nutrition Risk Screen: no indicators present    Nutrition/Diet History  Typical Food/Fluid Intake: decreased PTA  Food Preferences: gatorade/powerade  Do you have any cultural, spiritual, Worship conflicts, given your current situation?: no cultural or Worship food prefs identified  Factors Affecting Nutritional Intake: nausea/vomiting    Anthropometrics  Temp: 96.1 °F (35.6 °C)  Height Method: Stated  Height: 5' 1" (154.9 cm)  Height (inches): 61 in  Weight Method: Bed Scale  Weight: 74 kg (163 lb 2.3 oz)  Weight (lb): 163.14 lb  Ideal Body Weight (IBW), Female: 105 lb  % Ideal Body Weight, Female (lb): 173.85 " lb  BMI (Calculated): 34.6  BMI Grade: 30 - 34.9- obesity - grade I     Lab/Procedures/Meds  Pertinent Labs Reviewed: reviewed  Pertinent Labs Comments: BUN 7L, Alb 2.4L, PAB 12L  Pertinent Medications Reviewed: reviewed  Pertinent Medications Comments: aspirin, inuslin, Lasix, lactobacillus, Megace    Physical Findings/Assessment  Overall Physical Appearance: overweight  Tubes: (none)  Oral/Mouth Cavity: tooth/teeth missing  Skin: (Finn 19-abd incision/stg II sacral spine)    Estimated/Assessed Needs  Weight Used For Calorie Calculations: 74 kg (163 lb 2.3 oz)  Energy Calorie Requirements (kcal): 1850 (25 kcal/kg)  Energy Need Method: Kcal/kg  Protein Requirements: 74g (1.0g/kg)  Weight Used For Protein Calculations: 74 kg (163 lb 2.3 oz)  Fluid Requirements (mL): 1ml/kcal or per MD  Fluid Need Method: RDA Method  RDA Method (mL): 1850  CHO Requirement: 200g    Nutrition Prescription Ordered  Current Diet Order: 2000 ADA Cardiac low Na  Oral Nutrition Supplement: Boost Glucose Control    Evaluation of Received Nutrient/Fluid Intake  I/O: not fully recorded  Energy Calories Required: not meeting needs  Protein Required: not meeting needs  Fluid Required: not meeting needs  Comments: LBM 9/17  % Intake of Estimated Energy Needs: 0 - 25 %  % Meal Intake: 0 - 25 %    Nutrition Risk  Level of Risk/Frequency of Follow-up: (2xweekly)     Assessment and Plan  Nutrition Problem  Inadequate energy intake    Related to (etiology):   Decreased appetite 2/2 N/V    Signs and Symptoms (as evidenced by):   PO intake <25%    Interventions/Recommendations (treatment strategy):  See recs    Nutrition Diagnosis Status:   Continues     Monitor and Evaluation  Food and Nutrient Intake: energy intake, food and beverage intake  Food and Nutrient Adminstration: diet order  Physical Activity and Function: nutrition-related ADLs and IADLs  Anthropometric Measurements: weight, weight change  Biochemical Data, Medical Tests and Procedures:  electrolyte and renal panel, glucose/endocrine profile, gastrointestinal profile, inflammatory profile, lipid profile  Nutrition-Focused Physical Findings: overall appearance     Nutrition Follow-Up  RD Follow-up?: Yes

## 2018-09-18 PROBLEM — R62.7 ADULT FAILURE TO THRIVE: Status: ACTIVE | Noted: 2018-09-18

## 2018-09-18 LAB
ALBUMIN SERPL BCP-MCNC: 2.5 G/DL
ALP SERPL-CCNC: 98 U/L
ALT SERPL W/O P-5'-P-CCNC: 9 U/L
ANION GAP SERPL CALC-SCNC: 3 MMOL/L
AST SERPL-CCNC: 14 U/L
BASOPHILS # BLD AUTO: 0 K/UL
BASOPHILS NFR BLD: 0 %
BILIRUB SERPL-MCNC: 0.4 MG/DL
BILIRUB UR QL STRIP: NEGATIVE
BUN SERPL-MCNC: 7 MG/DL
CALCIUM SERPL-MCNC: 8.8 MG/DL
CHLORIDE SERPL-SCNC: 98 MMOL/L
CLARITY UR: CLEAR
CO2 SERPL-SCNC: 38 MMOL/L
COLOR UR: YELLOW
CREAT SERPL-MCNC: 0.7 MG/DL
DIFFERENTIAL METHOD: ABNORMAL
EOSINOPHIL # BLD AUTO: 0 K/UL
EOSINOPHIL NFR BLD: 0.4 %
ERYTHROCYTE [DISTWIDTH] IN BLOOD BY AUTOMATED COUNT: 18.9 %
EST. GFR  (AFRICAN AMERICAN): >60 ML/MIN/1.73 M^2
EST. GFR  (NON AFRICAN AMERICAN): >60 ML/MIN/1.73 M^2
GLUCOSE SERPL-MCNC: 105 MG/DL
GLUCOSE UR QL STRIP: NEGATIVE
HCT VFR BLD AUTO: 33.1 %
HGB BLD-MCNC: 10.2 G/DL
HGB UR QL STRIP: NEGATIVE
KETONES UR QL STRIP: NEGATIVE
LEUKOCYTE ESTERASE UR QL STRIP: NEGATIVE
LYMPHOCYTES # BLD AUTO: 1 K/UL
LYMPHOCYTES NFR BLD: 14.6 %
MAGNESIUM SERPL-MCNC: 1.6 MG/DL
MCH RBC QN AUTO: 26.6 PG
MCHC RBC AUTO-ENTMCNC: 30.8 G/DL
MCV RBC AUTO: 86 FL
MONOCYTES # BLD AUTO: 0.5 K/UL
MONOCYTES NFR BLD: 7.6 %
NEUTROPHILS # BLD AUTO: 5.2 K/UL
NEUTROPHILS NFR BLD: 77.1 %
NITRITE UR QL STRIP: NEGATIVE
PH UR STRIP: 7 [PH] (ref 5–8)
PHOSPHATE SERPL-MCNC: 2.8 MG/DL
PLATELET # BLD AUTO: 168 K/UL
PMV BLD AUTO: 8.2 FL
POCT GLUCOSE: 120 MG/DL (ref 70–110)
POCT GLUCOSE: 138 MG/DL (ref 70–110)
POCT GLUCOSE: 143 MG/DL (ref 70–110)
POCT GLUCOSE: 85 MG/DL (ref 70–110)
POTASSIUM SERPL-SCNC: 3.7 MMOL/L
PROT SERPL-MCNC: 5.5 G/DL
PROT UR QL STRIP: ABNORMAL
RBC # BLD AUTO: 3.84 M/UL
SODIUM SERPL-SCNC: 139 MMOL/L
SP GR UR STRIP: 1.01 (ref 1–1.03)
URN SPEC COLLECT METH UR: ABNORMAL
UROBILINOGEN UR STRIP-ACNC: NEGATIVE EU/DL
VANCOMYCIN TROUGH SERPL-MCNC: 14.8 UG/ML
WBC # BLD AUTO: 6.71 K/UL

## 2018-09-18 PROCEDURE — 80202 ASSAY OF VANCOMYCIN: CPT

## 2018-09-18 PROCEDURE — 99900035 HC TECH TIME PER 15 MIN (STAT)

## 2018-09-18 PROCEDURE — 94799 UNLISTED PULMONARY SVC/PX: CPT

## 2018-09-18 PROCEDURE — 81003 URINALYSIS AUTO W/O SCOPE: CPT

## 2018-09-18 PROCEDURE — 25000003 PHARM REV CODE 250: Performed by: STUDENT IN AN ORGANIZED HEALTH CARE EDUCATION/TRAINING PROGRAM

## 2018-09-18 PROCEDURE — 63600175 PHARM REV CODE 636 W HCPCS: Performed by: STUDENT IN AN ORGANIZED HEALTH CARE EDUCATION/TRAINING PROGRAM

## 2018-09-18 PROCEDURE — 63600175 PHARM REV CODE 636 W HCPCS: Performed by: INTERNAL MEDICINE

## 2018-09-18 PROCEDURE — 27000221 HC OXYGEN, UP TO 24 HOURS

## 2018-09-18 PROCEDURE — 25000242 PHARM REV CODE 250 ALT 637 W/ HCPCS: Performed by: STUDENT IN AN ORGANIZED HEALTH CARE EDUCATION/TRAINING PROGRAM

## 2018-09-18 PROCEDURE — 83735 ASSAY OF MAGNESIUM: CPT

## 2018-09-18 PROCEDURE — 11000001 HC ACUTE MED/SURG PRIVATE ROOM

## 2018-09-18 PROCEDURE — 25000003 PHARM REV CODE 250: Performed by: FAMILY MEDICINE

## 2018-09-18 PROCEDURE — 94640 AIRWAY INHALATION TREATMENT: CPT

## 2018-09-18 PROCEDURE — 84100 ASSAY OF PHOSPHORUS: CPT

## 2018-09-18 PROCEDURE — 97535 SELF CARE MNGMENT TRAINING: CPT

## 2018-09-18 PROCEDURE — 85025 COMPLETE CBC W/AUTO DIFF WBC: CPT

## 2018-09-18 PROCEDURE — 94761 N-INVAS EAR/PLS OXIMETRY MLT: CPT

## 2018-09-18 PROCEDURE — 80053 COMPREHEN METABOLIC PANEL: CPT

## 2018-09-18 RX ORDER — CLONIDINE 0.2 MG/24H
1 PATCH, EXTENDED RELEASE TRANSDERMAL
Status: ON HOLD | COMMUNITY
End: 2018-09-23

## 2018-09-18 RX ORDER — TAMSULOSIN HYDROCHLORIDE 0.4 MG/1
0.4 CAPSULE ORAL DAILY
Status: ON HOLD | COMMUNITY
End: 2018-09-18 | Stop reason: CLARIF

## 2018-09-18 RX ORDER — CLONIDINE HYDROCHLORIDE 0.2 MG/1
0.2 TABLET ORAL 3 TIMES DAILY
Status: ON HOLD | COMMUNITY
End: 2018-09-18 | Stop reason: CLARIF

## 2018-09-18 RX ORDER — IRBESARTAN AND HYDROCHLOROTHIAZIDE 150; 12.5 MG/1; MG/1
1 TABLET, FILM COATED ORAL DAILY
Status: ON HOLD | COMMUNITY
End: 2018-09-18 | Stop reason: CLARIF

## 2018-09-18 RX ORDER — AMLODIPINE BESYLATE 5 MG/1
5 TABLET ORAL 2 TIMES DAILY
Status: ON HOLD | COMMUNITY
End: 2018-09-18 | Stop reason: CLARIF

## 2018-09-18 RX ORDER — SIMETHICONE 125 MG
125 TABLET,CHEWABLE ORAL EVERY 6 HOURS PRN
Status: DISCONTINUED | OUTPATIENT
Start: 2018-09-18 | End: 2018-09-20 | Stop reason: HOSPADM

## 2018-09-18 RX ORDER — POTASSIUM CHLORIDE 20 MEQ/1
20 TABLET, EXTENDED RELEASE ORAL 2 TIMES DAILY
Status: ON HOLD | COMMUNITY
End: 2018-09-18 | Stop reason: CLARIF

## 2018-09-18 RX ORDER — GLIPIZIDE 5 MG/1
5 TABLET, FILM COATED, EXTENDED RELEASE ORAL
Status: ON HOLD | COMMUNITY
End: 2018-09-18 | Stop reason: CLARIF

## 2018-09-18 RX ADMIN — ASPIRIN 81 MG: 81 TABLET, COATED ORAL at 09:09

## 2018-09-18 RX ADMIN — IPRATROPIUM BROMIDE AND ALBUTEROL SULFATE 3 ML: .5; 3 SOLUTION RESPIRATORY (INHALATION) at 04:09

## 2018-09-18 RX ADMIN — ENOXAPARIN SODIUM 40 MG: 100 INJECTION SUBCUTANEOUS at 06:09

## 2018-09-18 RX ADMIN — IPRATROPIUM BROMIDE AND ALBUTEROL SULFATE 3 ML: .5; 3 SOLUTION RESPIRATORY (INHALATION) at 11:09

## 2018-09-18 RX ADMIN — SIMVASTATIN 40 MG: 20 TABLET, FILM COATED ORAL at 08:09

## 2018-09-18 RX ADMIN — DILTIAZEM HYDROCHLORIDE 180 MG: 180 CAPSULE, COATED, EXTENDED RELEASE ORAL at 09:09

## 2018-09-18 RX ADMIN — ERTAPENEM SODIUM 1 G: 1 INJECTION, POWDER, LYOPHILIZED, FOR SOLUTION INTRAMUSCULAR; INTRAVENOUS at 08:09

## 2018-09-18 RX ADMIN — LACTOBACILLUS TAB 4 TABLET: TAB at 06:09

## 2018-09-18 RX ADMIN — IPRATROPIUM BROMIDE AND ALBUTEROL SULFATE 3 ML: .5; 3 SOLUTION RESPIRATORY (INHALATION) at 12:09

## 2018-09-18 RX ADMIN — CLOPIDOGREL BISULFATE 75 MG: 75 TABLET ORAL at 09:09

## 2018-09-18 RX ADMIN — MEGESTROL ACETATE 40 MG: 40 TABLET ORAL at 09:09

## 2018-09-18 RX ADMIN — LOSARTAN POTASSIUM 100 MG: 50 TABLET, FILM COATED ORAL at 09:09

## 2018-09-18 RX ADMIN — LACTOBACILLUS TAB 4 TABLET: TAB at 01:09

## 2018-09-18 RX ADMIN — PREDNISONE 10 MG: 10 TABLET ORAL at 09:09

## 2018-09-18 RX ADMIN — FUROSEMIDE 40 MG: 40 TABLET ORAL at 09:09

## 2018-09-18 RX ADMIN — THEOPHYLLINE ANHYDROUS 200 MG: 100 CAPSULE, EXTENDED RELEASE ORAL at 09:09

## 2018-09-18 RX ADMIN — PANTOPRAZOLE SODIUM 40 MG: 40 TABLET, DELAYED RELEASE ORAL at 09:09

## 2018-09-18 RX ADMIN — METHOCARBAMOL 1000 MG: 500 TABLET ORAL at 10:09

## 2018-09-18 RX ADMIN — VANCOMYCIN HYDROCHLORIDE 750 MG: 750 INJECTION, POWDER, LYOPHILIZED, FOR SOLUTION INTRAVENOUS at 06:09

## 2018-09-18 RX ADMIN — IPRATROPIUM BROMIDE AND ALBUTEROL SULFATE 3 ML: .5; 3 SOLUTION RESPIRATORY (INHALATION) at 07:09

## 2018-09-18 RX ADMIN — METHOCARBAMOL 1000 MG: 500 TABLET ORAL at 12:09

## 2018-09-18 RX ADMIN — DULOXETINE 60 MG: 30 CAPSULE, DELAYED RELEASE ORAL at 09:09

## 2018-09-18 RX ADMIN — PRAZOSIN HYDROCHLORIDE 5 MG: 1 CAPSULE ORAL at 10:09

## 2018-09-18 RX ADMIN — LACTOBACILLUS TAB 4 TABLET: TAB at 09:09

## 2018-09-18 RX ADMIN — PRAZOSIN HYDROCHLORIDE 5 MG: 1 CAPSULE ORAL at 08:09

## 2018-09-18 RX ADMIN — PREDNISONE 10 MG: 10 TABLET ORAL at 08:09

## 2018-09-18 NOTE — NURSING
Plan of care reviewed with patient.Pt very quiet but answers questions correctly. 02 at 2L per nasal canula. Continues on IV ABT for MRSA. Abdominal dressing in place at this time.Went to access pts portacath, but evening nurse unable to find right access needle size on this floor.Evening nurse called ICU for right needle.Pt ambulated to the BR x1 today. Med BM noted. Continues on 3 day strict I and O. Verbalizes to staff understanding. NSR on monitor with 0 red alarms noted.  No acute distress observed at this time. Side rails x2, bed in low position, call bell within reach. Bed alarm in place for patient safety. Will relay to oncoming nurse to monitor for changes in condition.

## 2018-09-18 NOTE — PLAN OF CARE
Patient declined by [facility]. They are not in network with patient's insurance and are unable to accept out of network patients.  Per admissions coordinator Justo, patient's out of network benefits are as follows:     $950 deductible (not yet met per insurance) and skilled benefits for [facility] facility is $250/day for days 1-58.  will notify patient.

## 2018-09-18 NOTE — PLAN OF CARE
Problem: Patient Care Overview  Goal: Plan of Care Review  Outcome: Ongoing (interventions implemented as appropriate)  Plan of care reviewed with patient. Patient voiced understanding. NS-ST on monitor, highest run was 110s. Pt spent a lot of time up in chair. No acute distress noted. When in bed pt was encouraged to shift weight.  Side rails x 2, bed in lowest position, call bell within reach, pt advised to call for assistance. Maintain bed alarm for patient safety.

## 2018-09-18 NOTE — PLAN OF CARE
Plan of care reviewed with patient. Patient verbalized complete understanding. Fall precautions maintained. Bed in lowest position, locked, call light within reach and bed alarm is on. Side rails up x's 2 with slip resistant socks on. Accuchecks performed. Nurse instructed patient to notify staff for any assistance and the patient verbalized complete understanding. Patient on telemetry throughout shift with no ectopy noted. Will continue to monitor.

## 2018-09-18 NOTE — PROGRESS NOTES
"Surgery follow up  BP (!) 154/81   Pulse 78   Temp 97.1 °F (36.2 °C)   Resp 18   Ht 5' 1" (1.549 m)   Wt 74 kg (163 lb 2.3 oz)   LMP  (LMP Unknown)   SpO2 97%   Breastfeeding? No   BMI 30.83 kg/m²   I/O last 3 completed shifts:  In: 775 [P.O.:425; IV Piggyback:350]  Out: 2188 [Urine:2188]  I/O this shift:  In: 120 [P.O.:120]  Out: 1818 [Urine:1818]  Recent Results (from the past 336 hour(s))   CBC with Automated Differential    Collection Time: 09/18/18  5:20 AM   Result Value Ref Range    WBC 6.71 3.90 - 12.70 K/uL    Hemoglobin 10.2 (L) 12.0 - 16.0 g/dL    Hematocrit 33.1 (L) 37.0 - 48.5 %    Platelets 168 150 - 350 K/uL   CBC with Automated Differential    Collection Time: 09/17/18  4:30 AM   Result Value Ref Range    WBC 5.94 3.90 - 12.70 K/uL    Hemoglobin 10.0 (L) 12.0 - 16.0 g/dL    Hematocrit 32.8 (L) 37.0 - 48.5 %    Platelets 196 150 - 350 K/uL   CBC with Automated Differential    Collection Time: 09/16/18  5:00 AM   Result Value Ref Range    WBC 7.29 3.90 - 12.70 K/uL    Hemoglobin 10.0 (L) 12.0 - 16.0 g/dL    Hematocrit 32.5 (L) 37.0 - 48.5 %    Platelets 197 150 - 350 K/uL       Abdomen soft , tolerating diet , wound ok , on pt/ot.  "

## 2018-09-18 NOTE — PROGRESS NOTES
Progress Note  [facility]  Admit Date: 9/10/2018   LOS: 7 days   SUBJECTIVE:     NAEON.  Afebrile.  Port accessed and central line removed.  Some abdominal pain overnight pt attributes to msk complaints from moving more.  No CP, SOB, fevers, chills.  Sitting in chair, working with PT/Ot.    ROS   See above    OBJECTIVE:   Vital Signs (Most Recent)  Temp: 96.4 °F (35.8 °C) (09/18/18 0915)  Pulse: 100 (09/18/18 1200)  Resp: 18 (09/18/18 1136)  BP: (!) 148/90 (09/18/18 0915)  SpO2: 97 % (09/18/18 1136)    I & O (Last 24H):    Intake/Output Summary (Last 24 hours) at 9/18/2018 1334  Last data filed at 9/18/2018 0353  Gross per 24 hour   Intake --   Output 1988 ml   Net -1988 ml     Wt Readings from Last 3 Encounters:   09/17/18 74 kg (163 lb 2.3 oz)   09/05/18 88.9 kg (195 lb 15.8 oz)   08/14/18 81.6 kg (180 lb)       Current Diet Order   Procedures    Diet diabetic [facility]; 2000 Calorie; Low Sodium,2gm, Cardiac (Low Na/Chol)     Order Specific Question:   Indicate patient location for additional diet options:     Answer:   [facility]     Order Specific Question:   Total calories:     Answer:   2000 Calorie     Order Specific Question:   Additional Diet Options:     Answer:   Low Sodium,2gm     Order Specific Question:   Additional Diet Options:     Answer:   Cardiac (Low Na/Chol)        Physical Exam   Constitutional: She is oriented to person, place, and time. No distress.   HENT:   Head: Normocephalic.   Eyes: Conjunctivae are normal.   Neck: Normal range of motion.   Cardiovascular: Normal rate, regular rhythm and normal heart sounds.   No murmur heard.  Pulmonary/Chest: Breath sounds normal. No respiratory distress. She has no wheezes. She has no rales.   Port in place for IV sticks though maybe does not flush/pull back, no skin changes, no tenderness; continued abdominal breathing   Abdominal: Soft. Bowel sounds are normal. There is no tenderness (diffusely). There is no rebound.   Wound  healing, slightly TTP   Musculoskeletal: Normal range of motion. She exhibits no edema.   Neurological: She is alert and oriented to person, place, and time.   Skin: Skin is warm and dry. She is not diaphoretic. No erythema.   Psychiatric: Affect and judgment normal.   Nursing note and vitals reviewed.    Laboratory Data:  CBC  Recent Labs   Lab  09/16/18   0500  09/17/18   0430  09/18/18   0520   WBC  7.29  5.94  6.71   RBC  3.84*  3.80*  3.84*   HGB  10.0*  10.0*  10.2*   HCT  32.5*  32.8*  33.1*   PLT  197  196  168   MCV  85  86  86   MCH  26.0*  26.3*  26.6*   MCHC  30.8*  30.5*  30.8*     CMP  Recent Labs   Lab  09/16/18   0500  09/17/18   0430  09/18/18   0520   CALCIUM  9.1  8.8  8.8   PROT  5.4*  5.4*  5.5*   NA  138  139  139   K  3.7  3.6  3.7   CO2  35*  36*  38*   CL  97  98  98   BUN  7*  7*  7*   CREATININE  0.8  0.8  0.7   ALKPHOS  100  99  98   ALT  10  10  9*   AST  15  14  14   BILITOT  0.4  0.4  0.4     POCT-Glucose  POCT Glucose   Date Value Ref Range Status   09/18/2018 138 (H) 70 - 110 mg/dL Final   09/18/2018 85 70 - 110 mg/dL Final   09/17/2018 88 70 - 110 mg/dL Final   09/17/2018 120 (H) 70 - 110 mg/dL Final   09/17/2018 101 70 - 110 mg/dL Final   09/17/2018 136 (H) 70 - 110 mg/dL Final   09/16/2018 92 70 - 110 mg/dL Final   09/16/2018 87 70 - 110 mg/dL Final   09/16/2018 120 (H) 70 - 110 mg/dL Final   09/16/2018 98 70 - 110 mg/dL Final   09/15/2018 150 (H) 70 - 110 mg/dL Final   09/15/2018 128 (H) 70 - 110 mg/dL Final     COAGS  No results for input(s): PT, INR, APTT in the last 168 hours.  MICRO  Microbiology Results (last 7 days)     Procedure Component Value Units Date/Time    Blood culture [[id number]] Collected:  09/11/18 1249    Order Status:  Completed Specimen:  Blood Updated:  09/16/18 1812     Blood Culture, Routine No growth after 5 days.    Blood culture [[id number]] Collected:  09/11/18 1248    Order Status:  Completed Specimen:  Blood Updated:  09/16/18 1812     Blood Culture,  Routine No growth after 5 days.    Urine culture [[id number]] Collected:  09/11/18 2152    Order Status:  Completed Specimen:  Urine, Catheterized Updated:  09/13/18 0221     Urine Culture, Routine No growth    Aerobic culture (Specify Source) **CANNOT BE ORDERED AS STAT** [[id number]]  (Susceptibility) Collected:  09/10/18 1817    Order Status:  Completed Specimen:  Wound from Abdomen Updated:  09/12/18 1117     Aerobic Bacterial Culture --     METHICILLIN RESISTANT STAPHYLOCOCCUS AUREUS  Moderate  Skin david also present      Clostridium difficile EIA [[id number]] Collected:  09/11/18 1928    Order Status:  Completed Specimen:  Stool Updated:  09/11/18 2008     C. diff Antigen Negative     C difficile Toxins A+B, EIA Negative     Comment: Testing not recommended for children <24 months old.       Aerobic culture [[id number]]     Order Status:  No result Specimen:  Catheter Tip     Culture, Anaerobe [[id number]]     Order Status:  No result Specimen:  Catheter Tip     Fungus culture [[id number]]     Order Status:  No result Specimen:  Catheter Tip         LIPID PANEL  Lab Results   Component Value Date    CHOL 159 08/23/2018     Lab Results   Component Value Date    HDL 35 (L) 08/23/2018     Lab Results   Component Value Date    LDLCALC 86.6 08/23/2018     Lab Results   Component Value Date    TRIG 187 (H) 08/23/2018     Lab Results   Component Value Date    CHOLHDL 22.0 08/23/2018       Diagnostic Results:  Imaging in last 24 hours:     ASSESSMENT/PLAN:   Sheryl Martines is a 63 y.o. 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      UTI  S/p 3 days vanc, zosyn  Cultures no growth    C diff- resolved  Completed course of PO vanc  Recent test neg    Iron deficient anemia  2/p transfusion 2 units 9/13, H/H responded appropriately  No signs of active bleed  Stable    Failure to thrive  Low PO intake, started megace  Calorie count in process  Nutrition consult  Boost-glucose with all meals    Diabetes  Determir decreased to 10 BID, sliding scale  Glucoses well controlled  Monitoring and holding as needed due to low PO intake    GI ppx: protonix  VTE prophylaxis: lovenox  PT/OT/IS/ambulate    Dispo:  Working on SNF placement but will need IV abx. Will ensure SNF will accept pt with port.    9/18/2018 Julian Carmichael MD  9:30 AM       Is This A New Presentation, Or A Follow-Up?: Skin Lesions How Severe Is Your Skin Lesion?: mild

## 2018-09-18 NOTE — PT/OT/SLP PROGRESS
Physical Therapy      Patient Name:  Sheryl Martines   MRN:  [id number]    Patient not seen today secondary to (pt refusal). Will follow-up tomorrow.    Paul Sung, PTA

## 2018-09-18 NOTE — PLAN OF CARE
Problem: Occupational Therapy Goal  Goal: Occupational Therapy Goal  Goals to be met by: 10/13/18     Patient will increase functional independence with ADLs by performing:    LE Dressing with Minimal Assistance.  Grooming while seated with Stand-by Assistance.  Toileting from bedside commode with Minimal Assistance for hygiene and clothing management.   Supine to sit with Modified Indep.  Stand pivot transfers with Contact Guard Assistance.  Toilet transfer to bedside commode with Contact Guard Assistance.  Increased functional strength to WFL for self care skills and functional mobility.  Upper extremity exercise program x10 reps per handout, with independence.      Outcome: Ongoing (interventions implemented as appropriate)  Sheryl Martines is a 63 y.o. female with a medical diagnosis of Adult failure to thrive.  She presents with decreased overall strength, endurance, balance and [place] and safety with ADL's and fx mobility. Performance deficits affecting function are weakness, impaired endurance, impaired self care skills, impaired functional mobilty, gait instability, impaired balance, decreased safety awareness, decreased lower extremity function.  Pt with limited treatment secondary to drowsy.  Pt transferred to chair with Min A using RW.  Encouraged OOB ~2-3 hours. Continue OT services to address functional goals, progressing as able.    BRITTANY Gannon/L

## 2018-09-18 NOTE — PLAN OF CARE
Problem: Patient Care Overview  Goal: Plan of Care Review  Outcome: Ongoing (interventions implemented as appropriate)  Patient on oxygen with documented flow. Will attempt to wean per protocol.  Patient given aerosol treatment with no adverse reactions noted. Patient instructed on proper use.  No distress. Will continue to monitor.

## 2018-09-18 NOTE — PLAN OF CARE
TN spoke with Cathy from [facility]. She informed TN that they can accept patient. She will submit to the insurance, to see if she will be approved. She believes they are in network with patient's insurance. Cathy stated the MD at the SNF facility would like TN to schedule ID and Surgery follow-ups prior to her admission. Awaiting to hear back from insurance.     1513--TN called patient's daughter to update her regarding SNF facilities. TN will also update the patient. TN went to meet with patient. She is aware and agreeable to the discharge plan.    Future Appointments   Date Time Provider Department Center   9/25/2018  2:30 PM Bernadine Melendrez MD [facility]   10/3/2018  3:00 PM INFECTIOUS DISEASE, [facility] INFECT [facility]     Follow-up With  Details  Why  Contact Info   Bernadine Melendrez MD  On 9/25/2018  at 2:30 pm--Surgery Follow-Up  [address]  [address]  [place] [address]  [phone]   Ej Al MD  On 10/3/2018  at 3:00 pm--Infectious Disease Follow-Up  [address]  [address]  [place] [address]  [phone]      09/18/18 1520   Discharge Reassessment   Assessment Type Discharge Planning Reassessment   Discharge Plan [facility]     Vicky Tran RN  Transition Navigator  [phone]

## 2018-09-18 NOTE — PROGRESS NOTES
RD calorie count follow up for Day 3:    Day 1 calorie count= 200kcal & 7g protein. (pt was NPO for lunch)  Day 2 calorie count= 280 kcal & 12g protein.   Day 3 calorie count = 440 kcal  18 g protein      See RD note on 9/17 for full progress note. Thanks.    Helder Palma, XIMENA   09/18/2018

## 2018-09-18 NOTE — PT/OT/SLP PROGRESS
Occupational Therapy   Treatment    Name: Sheryl Martines  MRN: [id number]  Admitting Diagnosis:  Adult failure to thrive       Recommendations:     Discharge Recommendations: nursing facility, skilled  Discharge Equipment Recommendations:  walker, rolling  Barriers to discharge:  Decreased caregiver support    Subjective     Communicated with: RN prior to session.  Pain/Comfort:  · Pain Rating 1: 0/10  · Pain Rating Post-Intervention 1: 0/10    Patients cultural, spiritual, Lutheran conflicts given the current situation:      Objective:     Patient found with: bed alarm, telemetry, oxygen    General Precautions: Standard, fall   Orthopedic Precautions:N/A   Braces: N/A     Occupational Performance:    Bed Mobility:    · Patient completed Rolling/Turning to Left with  contact guard assistance and with side rail  · Patient completed Scooting/Bridging with stand by assistance and scoot seated to EOB  · Patient completed Supine to Sit with minimum assistance and increased time and effort, vc's for technique     Functional Mobility/Transfers:  · Patient completed Sit <> Stand Transfer with minimum assistance  with  rolling walker   · Patient completed Bed <> Chair Transfer using Stand Pivot technique with minimum assistance with rolling walker  · Functional Mobility: Pt took 4 steps to chair with CGA-Min A for RW mgmt.     Activities of Daily Living:  · Grooming: supervision chair level.    Patient left up in chair with all lines intact, call button in reach, chair alarm on and RN notified    [facility] 6 Click:  AMPAC Total Score: 15    Treatment & Education:  Pt drowsy throughout session and reporting she feels weak. She declined further intervention. Instructed pt to stay up in chair a couple hours if tolerated. RN notified.  Education:    Assessment:     Sheryl Martines is a 63 y.o. female with a medical diagnosis of Adult failure to thrive.  She presents with decreased overall strength, endurance, balance and  [place] and safety with ADL's and fx mobility. Performance deficits affecting function are weakness, impaired endurance, impaired self care skills, impaired functional mobilty, gait instability, impaired balance, decreased safety awareness, decreased lower extremity function.  Pt with limited treatment secondary to drowsy.  Pt transferred to chair with Min A using RW.  Encouraged OOB ~2-3 hours. Continue OT services to address functional goals, progressing as able.      Rehab Prognosis:  good; patient would benefit from acute skilled OT services to address these deficits and reach maximum level of function.       Plan:     Patient to be seen 5 x/week to address the above listed problems via self-care/home management, therapeutic activities, therapeutic exercises  · Plan of Care Expires: 10/13/18  · Plan of Care Reviewed with: patient    This Plan of care has been discussed with the patient who was involved in its development and understands and is in agreement with the identified goals and treatment plan    GOALS:   Multidisciplinary Problems     Occupational Therapy Goals        Problem: Occupational Therapy Goal    Goal Priority Disciplines Outcome Interventions   Occupational Therapy Goal     OT, PT/OT Ongoing (interventions implemented as appropriate)    Description:  Goals to be met by: 10/13/18     Patient will increase functional independence with ADLs by performing:    LE Dressing with Minimal Assistance.  Grooming while seated with Stand-by Assistance.  Toileting from bedside commode with Minimal Assistance for hygiene and clothing management.   Supine to sit with Modified Indep.  Stand pivot transfers with Contact Guard Assistance.  Toilet transfer to bedside commode with Contact Guard Assistance.  Increased functional strength to WFL for self care skills and functional mobility.  Upper extremity exercise program x10 reps per handout, with independence.                       Time Tracking:     OT Date  of Treatment: 09/18/18  OT Start Time: 1120  OT Stop Time: 1136  OT Total Time (min): 16 min    Billable Minutes:Self Care/Home Management 16    MANOJ Gannon  9/18/2018

## 2018-09-19 LAB
ALBUMIN SERPL BCP-MCNC: 2.6 G/DL
ALP SERPL-CCNC: 98 U/L
ALT SERPL W/O P-5'-P-CCNC: 10 U/L
ANION GAP SERPL CALC-SCNC: 7 MMOL/L
AST SERPL-CCNC: 14 U/L
BASOPHILS # BLD AUTO: 0.01 K/UL
BASOPHILS NFR BLD: 0.2 %
BILIRUB SERPL-MCNC: 0.5 MG/DL
BUN SERPL-MCNC: 9 MG/DL
CALCIUM SERPL-MCNC: 9.1 MG/DL
CHLORIDE SERPL-SCNC: 98 MMOL/L
CO2 SERPL-SCNC: 36 MMOL/L
CREAT SERPL-MCNC: 0.8 MG/DL
DIFFERENTIAL METHOD: ABNORMAL
EOSINOPHIL # BLD AUTO: 0.1 K/UL
EOSINOPHIL NFR BLD: 1.1 %
ERYTHROCYTE [DISTWIDTH] IN BLOOD BY AUTOMATED COUNT: 18.9 %
EST. GFR  (AFRICAN AMERICAN): >60 ML/MIN/1.73 M^2
EST. GFR  (NON AFRICAN AMERICAN): >60 ML/MIN/1.73 M^2
GLUCOSE SERPL-MCNC: 121 MG/DL
HCT VFR BLD AUTO: 35.1 %
HGB BLD-MCNC: 10.7 G/DL
LYMPHOCYTES # BLD AUTO: 0.9 K/UL
LYMPHOCYTES NFR BLD: 13.7 %
MAGNESIUM SERPL-MCNC: 1.8 MG/DL
MCH RBC QN AUTO: 26.1 PG
MCHC RBC AUTO-ENTMCNC: 30.5 G/DL
MCV RBC AUTO: 86 FL
MONOCYTES # BLD AUTO: 0.3 K/UL
MONOCYTES NFR BLD: 4.9 %
NEUTROPHILS # BLD AUTO: 5 K/UL
NEUTROPHILS NFR BLD: 79.9 %
PHOSPHATE SERPL-MCNC: 3.1 MG/DL
PLATELET # BLD AUTO: 173 K/UL
PMV BLD AUTO: 8.5 FL
POCT GLUCOSE: 126 MG/DL (ref 70–110)
POCT GLUCOSE: 129 MG/DL (ref 70–110)
POCT GLUCOSE: 131 MG/DL (ref 70–110)
POCT GLUCOSE: 158 MG/DL (ref 70–110)
POTASSIUM SERPL-SCNC: 3.5 MMOL/L
PROT SERPL-MCNC: 5.7 G/DL
RBC # BLD AUTO: 4.1 M/UL
SODIUM SERPL-SCNC: 141 MMOL/L
WBC # BLD AUTO: 6.28 K/UL

## 2018-09-19 PROCEDURE — 25000242 PHARM REV CODE 250 ALT 637 W/ HCPCS: Performed by: STUDENT IN AN ORGANIZED HEALTH CARE EDUCATION/TRAINING PROGRAM

## 2018-09-19 PROCEDURE — 83735 ASSAY OF MAGNESIUM: CPT

## 2018-09-19 PROCEDURE — 94640 AIRWAY INHALATION TREATMENT: CPT

## 2018-09-19 PROCEDURE — 63600175 PHARM REV CODE 636 W HCPCS: Performed by: FAMILY MEDICINE

## 2018-09-19 PROCEDURE — 27000221 HC OXYGEN, UP TO 24 HOURS

## 2018-09-19 PROCEDURE — 84100 ASSAY OF PHOSPHORUS: CPT

## 2018-09-19 PROCEDURE — 97530 THERAPEUTIC ACTIVITIES: CPT

## 2018-09-19 PROCEDURE — 94799 UNLISTED PULMONARY SVC/PX: CPT

## 2018-09-19 PROCEDURE — 25000003 PHARM REV CODE 250: Performed by: FAMILY MEDICINE

## 2018-09-19 PROCEDURE — 25000003 PHARM REV CODE 250: Performed by: STUDENT IN AN ORGANIZED HEALTH CARE EDUCATION/TRAINING PROGRAM

## 2018-09-19 PROCEDURE — 94761 N-INVAS EAR/PLS OXIMETRY MLT: CPT

## 2018-09-19 PROCEDURE — 80053 COMPREHEN METABOLIC PANEL: CPT

## 2018-09-19 PROCEDURE — 63600175 PHARM REV CODE 636 W HCPCS: Performed by: STUDENT IN AN ORGANIZED HEALTH CARE EDUCATION/TRAINING PROGRAM

## 2018-09-19 PROCEDURE — 85025 COMPLETE CBC W/AUTO DIFF WBC: CPT

## 2018-09-19 PROCEDURE — 63600175 PHARM REV CODE 636 W HCPCS: Performed by: INTERNAL MEDICINE

## 2018-09-19 PROCEDURE — 97535 SELF CARE MNGMENT TRAINING: CPT

## 2018-09-19 PROCEDURE — 25000003 PHARM REV CODE 250: Performed by: SURGERY

## 2018-09-19 PROCEDURE — 11000001 HC ACUTE MED/SURG PRIVATE ROOM

## 2018-09-19 RX ORDER — TRAMADOL HYDROCHLORIDE 50 MG/1
50 TABLET ORAL EVERY 6 HOURS PRN
Status: DISCONTINUED | OUTPATIENT
Start: 2018-09-19 | End: 2018-09-20 | Stop reason: HOSPADM

## 2018-09-19 RX ADMIN — LACTOBACILLUS TAB 4 TABLET: TAB at 09:09

## 2018-09-19 RX ADMIN — ERTAPENEM SODIUM 1 G: 1 INJECTION, POWDER, LYOPHILIZED, FOR SOLUTION INTRAMUSCULAR; INTRAVENOUS at 08:09

## 2018-09-19 RX ADMIN — PREDNISONE 10 MG: 10 TABLET ORAL at 08:09

## 2018-09-19 RX ADMIN — PREDNISONE 10 MG: 10 TABLET ORAL at 09:09

## 2018-09-19 RX ADMIN — LACTOBACILLUS TAB 4 TABLET: TAB at 05:09

## 2018-09-19 RX ADMIN — ACETAMINOPHEN 650 MG: 325 TABLET, FILM COATED ORAL at 05:09

## 2018-09-19 RX ADMIN — PANTOPRAZOLE SODIUM 40 MG: 40 TABLET, DELAYED RELEASE ORAL at 09:09

## 2018-09-19 RX ADMIN — ONDANSETRON 4 MG: 2 INJECTION INTRAMUSCULAR; INTRAVENOUS at 11:09

## 2018-09-19 RX ADMIN — IPRATROPIUM BROMIDE AND ALBUTEROL SULFATE 3 ML: .5; 3 SOLUTION RESPIRATORY (INHALATION) at 08:09

## 2018-09-19 RX ADMIN — IPRATROPIUM BROMIDE AND ALBUTEROL SULFATE 3 ML: .5; 3 SOLUTION RESPIRATORY (INHALATION) at 11:09

## 2018-09-19 RX ADMIN — PRAZOSIN HYDROCHLORIDE 5 MG: 1 CAPSULE ORAL at 08:09

## 2018-09-19 RX ADMIN — VANCOMYCIN HYDROCHLORIDE 750 MG: 750 INJECTION, POWDER, LYOPHILIZED, FOR SOLUTION INTRAVENOUS at 05:09

## 2018-09-19 RX ADMIN — ENOXAPARIN SODIUM 40 MG: 100 INJECTION SUBCUTANEOUS at 05:09

## 2018-09-19 RX ADMIN — FUROSEMIDE 40 MG: 40 TABLET ORAL at 09:09

## 2018-09-19 RX ADMIN — PRAZOSIN HYDROCHLORIDE 5 MG: 1 CAPSULE ORAL at 09:09

## 2018-09-19 RX ADMIN — IPRATROPIUM BROMIDE AND ALBUTEROL SULFATE 3 ML: .5; 3 SOLUTION RESPIRATORY (INHALATION) at 04:09

## 2018-09-19 RX ADMIN — SIMVASTATIN 40 MG: 20 TABLET, FILM COATED ORAL at 08:09

## 2018-09-19 RX ADMIN — LOSARTAN POTASSIUM 100 MG: 50 TABLET, FILM COATED ORAL at 09:09

## 2018-09-19 RX ADMIN — CLOPIDOGREL BISULFATE 75 MG: 75 TABLET ORAL at 09:09

## 2018-09-19 RX ADMIN — TRAMADOL HYDROCHLORIDE 50 MG: 50 TABLET, COATED ORAL at 09:09

## 2018-09-19 RX ADMIN — DULOXETINE 60 MG: 30 CAPSULE, DELAYED RELEASE ORAL at 09:09

## 2018-09-19 RX ADMIN — ACETAMINOPHEN 650 MG: 325 TABLET, FILM COATED ORAL at 12:09

## 2018-09-19 RX ADMIN — IPRATROPIUM BROMIDE AND ALBUTEROL SULFATE 3 ML: .5; 3 SOLUTION RESPIRATORY (INHALATION) at 07:09

## 2018-09-19 RX ADMIN — LACTOBACILLUS TAB 4 TABLET: TAB at 12:09

## 2018-09-19 RX ADMIN — ASPIRIN 81 MG: 81 TABLET, COATED ORAL at 09:09

## 2018-09-19 RX ADMIN — DILTIAZEM HYDROCHLORIDE 180 MG: 180 CAPSULE, COATED, EXTENDED RELEASE ORAL at 09:09

## 2018-09-19 RX ADMIN — MEGESTROL ACETATE 40 MG: 40 TABLET ORAL at 08:09

## 2018-09-19 RX ADMIN — THEOPHYLLINE ANHYDROUS 200 MG: 100 CAPSULE, EXTENDED RELEASE ORAL at 09:09

## 2018-09-19 RX ADMIN — MEGESTROL ACETATE 40 MG: 40 TABLET ORAL at 09:09

## 2018-09-19 NOTE — PT/OT/SLP PROGRESS
Occupational Therapy   Treatment    Name: Sheryl Martines  MRN: [id number]  Admitting Diagnosis:  Adult failure to thrive       Recommendations:     Discharge Recommendations: nursing facility, skilled  Discharge Equipment Recommendations:  walker, rolling  Barriers to discharge:  Decreased caregiver support    Subjective     Communicated with: RN prior to session.  Pain/Comfort:  · Pain Rating 1: 0/10  · Pain Rating Post-Intervention 1: 0/10    Patients cultural, spiritual, Jain conflicts given the current situation:      Objective:     Patient found with: bed alarm, telemetry, oxygen    General Precautions: Standard, fall   Orthopedic Precautions:N/A   Braces: N/A     Occupational Performance:    Bed Mobility:    · Patient completed Rolling/Turning to Left with  minimum assistance and with side rail  · Patient completed Scooting/Bridging with minimum assistance and scoot seated to EOB  · Patient completed Supine to Sit with minimum assistance and increased time and effort.  VC's for technique.     Functional Mobility/Transfers:  · Patient completed Sit <> Stand Transfer with minimum assistance  with  rolling walker   · Patient completed Bed <> Chair Transfer using Stand Pivot technique with minimum assistance with rolling walker  · Functional Mobility: Pt took 5 steps to chair with Min A using RW.  Pt requires assist to safely manage the RW.     Activities of Daily Living:  · Grooming: stand by assistance seated level    Patient left up in chair with all lines intact, call button in reach, chair alarm on and RN notified    [facility] 6 Click:  AMPAC Total Score: 15    Treatment & Education:  Pt unable to perform shld ROM secondary to pain. Pt reports RTC tears in B shlds.  Pt declined other therex secondary to nausea.     Education:    Assessment:     Sheryl Martines is a 63 y.o. female with a medical diagnosis of Adult failure to thrive.  She presents with decreased overall strength, endurance, balance and  [place] and safety with ADL's and fx mobility. Performance deficits affecting function are weakness, impaired endurance, impaired self care skills, impaired functional mobilty, gait instability, impaired balance, decreased upper extremity function, decreased lower extremity function, decreased safety awareness.  Pt with limited tx secondary to nausea.  RN aware and provided with meds.  Pt did agree to transfer to chair. Continue OT services to address functional goals, progressing as able.      Rehab Prognosis:  good; patient would benefit from acute skilled OT services to address these deficits and reach maximum level of function.       Plan:     Patient to be seen 5 x/week to address the above listed problems via self-care/home management, therapeutic activities, therapeutic exercises  · Plan of Care Expires: 10/13/18  · Plan of Care Reviewed with: patient    This Plan of care has been discussed with the patient who was involved in its development and understands and is in agreement with the identified goals and treatment plan    GOALS:   Multidisciplinary Problems     Occupational Therapy Goals        Problem: Occupational Therapy Goal    Goal Priority Disciplines Outcome Interventions   Occupational Therapy Goal     OT, PT/OT Ongoing (interventions implemented as appropriate)    Description:  Goals to be met by: 10/13/18     Patient will increase functional independence with ADLs by performing:    LE Dressing with Minimal Assistance.  Grooming while seated with Stand-by Assistance.  Toileting from bedside commode with Minimal Assistance for hygiene and clothing management.   Supine to sit with Modified Indep.  Stand pivot transfers with Contact Guard Assistance.  Toilet transfer to bedside commode with Contact Guard Assistance.  Increased functional strength to WFL for self care skills and functional mobility.  Upper extremity exercise program x10 reps per handout, with independence.                        Time Tracking:     OT Date of Treatment: 09/19/18  OT Start Time: 1113  OT Stop Time: 1136  OT Total Time (min): 23 min    Billable Minutes:Self Care/Home Management 8  Therapeutic Activity 15    MANOJ Gannon  9/19/2018

## 2018-09-19 NOTE — CONSULTS
OSNF consult approved for admit to SNF today will need discharge re-admit Gen SNF orders entered into Epic prior to transfer. Call nurse re-port to [phone] call physician report to

## 2018-09-19 NOTE — PROGRESS NOTES
"Surgery follow up  BP (!) 165/89   Pulse 99   Temp 96.9 °F (36.1 °C)   Resp 18   Ht 5' 1" (1.549 m)   Wt 72.1 kg (158 lb 15.2 oz)   LMP  (LMP Unknown)   SpO2 100%   Breastfeeding? No   BMI 30.03 kg/m²   I/O last 3 completed shifts:  In: 820 [P.O.:120; IV Piggyback:700]  Out: 4321 [Urine:4321]  I/O this shift:  In: 120 [P.O.:120]  Out: 872 [Urine:872]  Recent Results (from the past 336 hour(s))   CBC with Automated Differential    Collection Time: 09/19/18  5:30 AM   Result Value Ref Range    WBC 6.28 3.90 - 12.70 K/uL    Hemoglobin 10.7 (L) 12.0 - 16.0 g/dL    Hematocrit 35.1 (L) 37.0 - 48.5 %    Platelets 173 150 - 350 K/uL   CBC with Automated Differential    Collection Time: 09/18/18  5:20 AM   Result Value Ref Range    WBC 6.71 3.90 - 12.70 K/uL    Hemoglobin 10.2 (L) 12.0 - 16.0 g/dL    Hematocrit 33.1 (L) 37.0 - 48.5 %    Platelets 168 150 - 350 K/uL   CBC with Automated Differential    Collection Time: 09/17/18  4:30 AM   Result Value Ref Range    WBC 5.94 3.90 - 12.70 K/uL    Hemoglobin 10.0 (L) 12.0 - 16.0 g/dL    Hematocrit 32.8 (L) 37.0 - 48.5 %    Platelets 196 150 - 350 K/uL   abdomen soft , wound healing well no drainage   Surgically stable.  "

## 2018-09-19 NOTE — PROGRESS NOTES
Progress Note  [facility]  Admit Date: 9/10/2018   LOS: 8 days   SUBJECTIVE:     NAEON.  Afebrile.  Pain well controlled.  Tolerating PO.  No N/V. working with PT/Ot.    ROS   See above    OBJECTIVE:   Vital Signs (Most Recent)  Temp: 98.6 °F (37 °C) (09/19/18 0757)  Pulse: (!) 59 (09/19/18 0800)  Resp: 16 (09/19/18 0757)  BP: (!) 168/78 (09/19/18 0757)  SpO2: 100 % (09/19/18 0738)    I & O (Last 24H):    Intake/Output Summary (Last 24 hours) at 9/19/2018 0923  Last data filed at 9/19/2018 0730  Gross per 24 hour   Intake 700 ml   Output 3455 ml   Net -2755 ml     Wt Readings from Last 3 Encounters:   09/19/18 72.1 kg (158 lb 15.2 oz)   09/05/18 88.9 kg (195 lb 15.8 oz)   08/14/18 81.6 kg (180 lb)       Current Diet Order   Procedures    Diet diabetic [facility]; 2000 Calorie; Low Sodium,2gm, Cardiac (Low Na/Chol)     Order Specific Question:   Indicate patient location for additional diet options:     Answer:   [facility]     Order Specific Question:   Total calories:     Answer:   2000 Calorie     Order Specific Question:   Additional Diet Options:     Answer:   Low Sodium,2gm     Order Specific Question:   Additional Diet Options:     Answer:   Cardiac (Low Na/Chol)        Physical Exam   Constitutional: She is oriented to person, place, and time. No distress.   HENT:   Head: Normocephalic.   Eyes: Conjunctivae are normal.   Neck: Normal range of motion.   Cardiovascular: Normal rate, regular rhythm and normal heart sounds.   No murmur heard.  Pulmonary/Chest: Breath sounds normal. No respiratory distress. She has no wheezes. She has no rales.   Port in place for IV sticks though maybe does not flush/pull back, no skin changes, no tenderness; continued abdominal breathing   Abdominal: Soft. Bowel sounds are normal. There is no tenderness. There is no rebound.   Wound healing, slightly TTP at incision site, bandage C/d/i   Musculoskeletal: Normal range of motion. She exhibits no edema.    Neurological: She is alert and oriented to person, place, and time.   Skin: Skin is warm and dry. She is not diaphoretic. No erythema.   Psychiatric: Affect and judgment normal.   Nursing note and vitals reviewed.    Laboratory Data:  CBC  Recent Labs   Lab  09/17/18 0430 09/18/18   0520  09/19/18   0530   WBC  5.94  6.71  6.28   RBC  3.80*  3.84*  4.10   HGB  10.0*  10.2*  10.7*   HCT  32.8*  33.1*  35.1*   PLT  196  168  173   MCV  86  86  86   MCH  26.3*  26.6*  26.1*   MCHC  30.5*  30.8*  30.5*     CMP  Recent Labs   Lab  09/17/18   0430 09/18/18   0520  09/19/18   0530   CALCIUM  8.8  8.8  9.1   PROT  5.4*  5.5*  5.7*   NA  139  139  141   K  3.6  3.7  3.5   CO2  36*  38*  36*   CL  98  98  98   BUN  7*  7*  9   CREATININE  0.8  0.7  0.8   ALKPHOS  99  98  98   ALT  10  9*  10   AST  14  14  14   BILITOT  0.4  0.4  0.5     POCT-Glucose  POCT Glucose   Date Value Ref Range Status   09/19/2018 129 (H) 70 - 110 mg/dL Final   09/18/2018 143 (H) 70 - 110 mg/dL Final   09/18/2018 120 (H) 70 - 110 mg/dL Final   09/18/2018 138 (H) 70 - 110 mg/dL Final   09/18/2018 85 70 - 110 mg/dL Final   09/17/2018 88 70 - 110 mg/dL Final   09/17/2018 120 (H) 70 - 110 mg/dL Final   09/17/2018 101 70 - 110 mg/dL Final   09/17/2018 136 (H) 70 - 110 mg/dL Final   09/16/2018 92 70 - 110 mg/dL Final   09/16/2018 87 70 - 110 mg/dL Final   09/16/2018 120 (H) 70 - 110 mg/dL Final     COAGS  No results for input(s): PT, INR, APTT in the last 168 hours.  MICRO  Microbiology Results (last 7 days)     Procedure Component Value Units Date/Time    Blood culture [[id number]] Collected:  09/11/18 1249    Order Status:  Completed Specimen:  Blood Updated:  09/16/18 1812     Blood Culture, Routine No growth after 5 days.    Blood culture [[id number]] Collected:  09/11/18 1248    Order Status:  Completed Specimen:  Blood Updated:  09/16/18 1812     Blood Culture, Routine No growth after 5 days.    Urine culture [[id number]] Collected:  09/11/18 215     Order Status:  Completed Specimen:  Urine, Catheterized Updated:  09/13/18 0221     Urine Culture, Routine No growth    Aerobic culture (Specify Source) **CANNOT BE ORDERED AS STAT** [[id number]]  (Susceptibility) Collected:  09/10/18 1817    Order Status:  Completed Specimen:  Wound from Abdomen Updated:  09/12/18 1117     Aerobic Bacterial Culture --     METHICILLIN RESISTANT STAPHYLOCOCCUS AUREUS  Moderate  Skin david also present          LIPID PANEL  Lab Results   Component Value Date    CHOL 159 08/23/2018     Lab Results   Component Value Date    HDL 35 (L) 08/23/2018     Lab Results   Component Value Date    LDLCALC 86.6 08/23/2018     Lab Results   Component Value Date    TRIG 187 (H) 08/23/2018     Lab Results   Component Value Date    CHOLHDL 22.0 08/23/2018       Diagnostic Results:  Imaging in last 24 hours:     ASSESSMENT/PLAN:   Sheryl Martines is a 63 y.o. 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     UTI  S/p 3 days vanc, zosyn  Cultures no growth    C diff- resolved  Completed course of PO vanc  Recent test neg    Iron deficient anemia  2/p transfusion 2 units 9/13, H/H responded appropriately  No signs of active bleed  Stable    Failure to thrive  Low PO intake, started megace  Calorie count shows low PO intake, but improving  Nutrition consult  Boost-glucose with all meals    Diabetes  Determir 10 BID, sliding scale.  Stable  Glucoses well controlled    GI ppx: protonix  VTE prophylaxis: lovenox  PT/OT/IS/ambulate    Dispo:  Placement, ideally in location accepting of indwelling port    9/19/2018 Julian REYNOLDS  MD Jany  9:30 AM

## 2018-09-19 NOTE — PLAN OF CARE
Problem: Patient Care Overview  Goal: Plan of Care Review  Outcome: Ongoing (interventions implemented as appropriate)  Received patient upon rounds, at 1910H, Patient seen in bed conscious, coherent 4x, on semifowler's position. With portacath on the right anterior chest wall, flushed patent, with clean and dry dressing  Intact. With abdominal incision site on the left lower abdomen with sanguinous non odorous discharge, cleansed with saline, dry gauge and non adhesive dressing applied. With oxygen  saturation of 95 percent on room air. Plan of care reviewed with patient  Patient noted understanding NSR on  tele monitor 70-80's. No acute distress noted. Reposition  patient Q2hrs. . Side rails x 2, bed in lowest position, call bell within reach, pt advised to call for assistance. Maintain bed alarm for patient safety. Blood sugar monitored, insulin administered. Abdominal pain occasionally, pain medications PRN administered.  Will continue to monitor patient.

## 2018-09-19 NOTE — PLAN OF CARE
Problem: Patient Care Overview  Goal: Plan of Care Review  Outcome: Ongoing (interventions implemented as appropriate)  Patient on RA, no respiratory distress noted. The proper method of use, as well as anticipated side effects, of this aerosol treatment are discussed demonstrated to the patient. Patient performs IS therapy. Pt states she is not wearing CPAP tonight. Family at the bedside. Will continue to monitor.

## 2018-09-19 NOTE — PT/OT/SLP PROGRESS
Physical Therapy      Patient Name:  Sheryl Martines   MRN:  [id number]    Patient not seen today secondary to (pt unavailable X 2 attempts). Will follow-up tomorrow.    Paul Sung, PTA

## 2018-09-19 NOTE — PLAN OF CARE
Patient accepted to [facility]. Cathy received insurance authorization. Patient will discharge first thing in the morning to facility. EMMANUEL Perry and  notified patient of acceptance.

## 2018-09-19 NOTE — PLAN OF CARE
Cathy from [facility] stated patient is approved by her insurance to go to their facility. I informed Dr. Gallagher, will likely discharge tomorrow.     TN notified patient and daughter Kandice.    Vicky Tran RN  Transition Navigator  [phone]

## 2018-09-19 NOTE — PLAN OF CARE
Problem: Patient Care Overview  Goal: Plan of Care Review  Outcome: Ongoing (interventions implemented as appropriate)  The proper method of use, as well as anticipated side effects, of this aerosol treatment are discussed and demonstrated to the patient. Patient performing incentive spirometer, achieving 1000 ml. O2 saturation 100% on nasal cannula. Decreased nasal cannula to 1 lpm. Will continue to monitor.

## 2018-09-19 NOTE — PLAN OF CARE
Problem: Occupational Therapy Goal  Goal: Occupational Therapy Goal  Goals to be met by: 10/13/18     Patient will increase functional independence with ADLs by performing:    LE Dressing with Minimal Assistance.  Grooming while seated with Stand-by Assistance.  Toileting from bedside commode with Minimal Assistance for hygiene and clothing management.   Supine to sit with Modified Indep.  Stand pivot transfers with Contact Guard Assistance.  Toilet transfer to bedside commode with Contact Guard Assistance.  Increased functional strength to WFL for self care skills and functional mobility.  Upper extremity exercise program x10 reps per handout, with independence.      Outcome: Ongoing (interventions implemented as appropriate)  Sheryl Martines is a 63 y.o. female with a medical diagnosis of Adult failure to thrive.  She presents with decreased overall strength, endurance, balance and [place] and safety with ADL's and fx mobility. Performance deficits affecting function are weakness, impaired endurance, impaired self care skills, impaired functional mobilty, gait instability, impaired balance, decreased upper extremity function, decreased lower extremity function, decreased safety awareness.  Pt with limited tx secondary to nausea.  RN aware and provided with meds.  Pt did agree to transfer to chair. Continue OT services to address functional goals, progressing as able.    BRITTANY Gannon/L

## 2018-09-20 ENCOUNTER — HOSPITAL ENCOUNTER (INPATIENT)
Facility: HOSPITAL | Age: 63
LOS: 3 days | Discharge: ANOTHER HOSPITAL | DRG: 872 | End: 2018-09-23
Attending: HOSPITALIST | Admitting: HOSPITALIST
Payer: MEDICARE

## 2018-09-20 VITALS
SYSTOLIC BLOOD PRESSURE: 138 MMHG | WEIGHT: 159.19 LBS | DIASTOLIC BLOOD PRESSURE: 79 MMHG | RESPIRATION RATE: 20 BRPM | TEMPERATURE: 98 F | HEART RATE: 106 BPM | HEIGHT: 61 IN | OXYGEN SATURATION: 100 % | BODY MASS INDEX: 30.06 KG/M2

## 2018-09-20 DIAGNOSIS — R50.9 FEVER, UNSPECIFIED FEVER CAUSE: ICD-10-CM

## 2018-09-20 DIAGNOSIS — T81.49XA INFECTED INCISION: ICD-10-CM

## 2018-09-20 PROBLEM — I15.2 HYPERTENSION ASSOCIATED WITH DIABETES: Status: RESOLVED | Noted: 2018-08-23 | Resolved: 2018-09-20

## 2018-09-20 PROBLEM — A04.72 INTESTINAL INFECTION DUE TO CLOSTRIDIUM DIFFICILE: Status: RESOLVED | Noted: 2018-09-13 | Resolved: 2018-09-20

## 2018-09-20 PROBLEM — I15.2 HYPERTENSION ASSOCIATED WITH DIABETES: Status: ACTIVE | Noted: 2018-08-23

## 2018-09-20 PROBLEM — E11.59 HYPERTENSION ASSOCIATED WITH DIABETES: Status: RESOLVED | Noted: 2018-08-23 | Resolved: 2018-09-20

## 2018-09-20 PROBLEM — A41.89 SEPSIS DUE TO OTHER ETIOLOGY: Status: RESOLVED | Noted: 2018-09-12 | Resolved: 2018-09-20

## 2018-09-20 PROBLEM — R09.02 HYPOXIA: Status: RESOLVED | Noted: 2018-09-11 | Resolved: 2018-09-20

## 2018-09-20 PROBLEM — E11.59 HYPERTENSION ASSOCIATED WITH DIABETES: Status: ACTIVE | Noted: 2018-08-23

## 2018-09-20 LAB
ALBUMIN SERPL BCP-MCNC: 2.6 G/DL
ALP SERPL-CCNC: 91 U/L
ALT SERPL W/O P-5'-P-CCNC: 10 U/L
ANION GAP SERPL CALC-SCNC: 8 MMOL/L
AST SERPL-CCNC: 14 U/L
BASOPHILS # BLD AUTO: 0 K/UL
BASOPHILS NFR BLD: 0 %
BILIRUB SERPL-MCNC: 0.5 MG/DL
BUN SERPL-MCNC: 12 MG/DL
CALCIUM SERPL-MCNC: 8.8 MG/DL
CHLORIDE SERPL-SCNC: 98 MMOL/L
CO2 SERPL-SCNC: 34 MMOL/L
CREAT SERPL-MCNC: 0.9 MG/DL
DIFFERENTIAL METHOD: ABNORMAL
EOSINOPHIL # BLD AUTO: 0 K/UL
EOSINOPHIL NFR BLD: 0.1 %
ERYTHROCYTE [DISTWIDTH] IN BLOOD BY AUTOMATED COUNT: 19.1 %
EST. GFR  (AFRICAN AMERICAN): >60 ML/MIN/1.73 M^2
EST. GFR  (NON AFRICAN AMERICAN): >60 ML/MIN/1.73 M^2
GLUCOSE SERPL-MCNC: 121 MG/DL
HCT VFR BLD AUTO: 35.7 %
HGB BLD-MCNC: 10.8 G/DL
LYMPHOCYTES # BLD AUTO: 1.1 K/UL
LYMPHOCYTES NFR BLD: 16.3 %
MAGNESIUM SERPL-MCNC: 1.8 MG/DL
MCH RBC QN AUTO: 25.9 PG
MCHC RBC AUTO-ENTMCNC: 30.3 G/DL
MCV RBC AUTO: 86 FL
MONOCYTES # BLD AUTO: 0.4 K/UL
MONOCYTES NFR BLD: 5.3 %
NEUTROPHILS # BLD AUTO: 5.4 K/UL
NEUTROPHILS NFR BLD: 78 %
PHOSPHATE SERPL-MCNC: 2.7 MG/DL
PLATELET # BLD AUTO: 179 K/UL
PMV BLD AUTO: 8.6 FL
POCT GLUCOSE: 119 MG/DL (ref 70–110)
POCT GLUCOSE: 127 MG/DL (ref 70–110)
POCT GLUCOSE: 136 MG/DL (ref 70–110)
POCT GLUCOSE: 143 MG/DL (ref 70–110)
POTASSIUM SERPL-SCNC: 3.3 MMOL/L
PROT SERPL-MCNC: 5.7 G/DL
RBC # BLD AUTO: 4.17 M/UL
SODIUM SERPL-SCNC: 140 MMOL/L
WBC # BLD AUTO: 6.95 K/UL

## 2018-09-20 PROCEDURE — 25000003 PHARM REV CODE 250: Performed by: STUDENT IN AN ORGANIZED HEALTH CARE EDUCATION/TRAINING PROGRAM

## 2018-09-20 PROCEDURE — 80053 COMPREHEN METABOLIC PANEL: CPT

## 2018-09-20 PROCEDURE — 27000221 HC OXYGEN, UP TO 24 HOURS

## 2018-09-20 PROCEDURE — 97530 THERAPEUTIC ACTIVITIES: CPT

## 2018-09-20 PROCEDURE — 83735 ASSAY OF MAGNESIUM: CPT

## 2018-09-20 PROCEDURE — 25000242 PHARM REV CODE 250 ALT 637 W/ HCPCS: Performed by: STUDENT IN AN ORGANIZED HEALTH CARE EDUCATION/TRAINING PROGRAM

## 2018-09-20 PROCEDURE — 94799 UNLISTED PULMONARY SVC/PX: CPT

## 2018-09-20 PROCEDURE — 94761 N-INVAS EAR/PLS OXIMETRY MLT: CPT

## 2018-09-20 PROCEDURE — 84100 ASSAY OF PHOSPHORUS: CPT

## 2018-09-20 PROCEDURE — 63700000 PHARM REV CODE 250 ALT 637 W/O HCPCS: Performed by: STUDENT IN AN ORGANIZED HEALTH CARE EDUCATION/TRAINING PROGRAM

## 2018-09-20 PROCEDURE — 94640 AIRWAY INHALATION TREATMENT: CPT

## 2018-09-20 PROCEDURE — 25000003 PHARM REV CODE 250: Performed by: NURSE PRACTITIONER

## 2018-09-20 PROCEDURE — 85025 COMPLETE CBC W/AUTO DIFF WBC: CPT

## 2018-09-20 PROCEDURE — 25000003 PHARM REV CODE 250: Performed by: FAMILY MEDICINE

## 2018-09-20 PROCEDURE — 63600175 PHARM REV CODE 636 W HCPCS: Performed by: STUDENT IN AN ORGANIZED HEALTH CARE EDUCATION/TRAINING PROGRAM

## 2018-09-20 PROCEDURE — 11000004 HC SNF PRIVATE

## 2018-09-20 RX ORDER — ONDANSETRON 2 MG/ML
4 INJECTION INTRAMUSCULAR; INTRAVENOUS EVERY 6 HOURS PRN
Status: CANCELLED | OUTPATIENT
Start: 2018-09-20

## 2018-09-20 RX ORDER — SIMETHICONE 125 MG
125 TABLET,CHEWABLE ORAL EVERY 6 HOURS PRN
Status: DISCONTINUED | OUTPATIENT
Start: 2018-09-20 | End: 2018-09-20

## 2018-09-20 RX ORDER — ZINC OXIDE 20 G/100G
OINTMENT TOPICAL
Status: DISCONTINUED | OUTPATIENT
Start: 2018-09-20 | End: 2018-10-02 | Stop reason: HOSPADM

## 2018-09-20 RX ORDER — IPRATROPIUM BROMIDE AND ALBUTEROL SULFATE 2.5; .5 MG/3ML; MG/3ML
3 SOLUTION RESPIRATORY (INHALATION) EVERY 4 HOURS
Status: DISCONTINUED | OUTPATIENT
Start: 2018-09-20 | End: 2018-09-21

## 2018-09-20 RX ORDER — PRAZOSIN HYDROCHLORIDE 5 MG/1
5 CAPSULE ORAL 2 TIMES DAILY
Status: DISCONTINUED | OUTPATIENT
Start: 2018-09-20 | End: 2018-10-02 | Stop reason: HOSPADM

## 2018-09-20 RX ORDER — ENOXAPARIN SODIUM 100 MG/ML
40 INJECTION SUBCUTANEOUS EVERY 24 HOURS
Status: DISCONTINUED | OUTPATIENT
Start: 2018-09-21 | End: 2018-10-02 | Stop reason: HOSPADM

## 2018-09-20 RX ORDER — CALCIUM CARBONATE 200(500)MG
500 TABLET,CHEWABLE ORAL 2 TIMES DAILY PRN
Status: DISCONTINUED | OUTPATIENT
Start: 2018-09-20 | End: 2018-10-02 | Stop reason: HOSPADM

## 2018-09-20 RX ORDER — SIMVASTATIN 40 MG/1
40 TABLET, FILM COATED ORAL NIGHTLY
Status: DISCONTINUED | OUTPATIENT
Start: 2018-09-20 | End: 2018-10-02 | Stop reason: HOSPADM

## 2018-09-20 RX ORDER — RAMELTEON 8 MG/1
8 TABLET ORAL NIGHTLY PRN
Status: CANCELLED | OUTPATIENT
Start: 2018-09-20

## 2018-09-20 RX ORDER — ALBUTEROL SULFATE 90 UG/1
2 AEROSOL, METERED RESPIRATORY (INHALATION) EVERY 4 HOURS PRN
Status: CANCELLED | OUTPATIENT
Start: 2018-09-20

## 2018-09-20 RX ORDER — INSULIN ASPART 100 [IU]/ML
0-5 INJECTION, SOLUTION INTRAVENOUS; SUBCUTANEOUS
Status: DISCONTINUED | OUTPATIENT
Start: 2018-09-20 | End: 2018-10-02 | Stop reason: HOSPADM

## 2018-09-20 RX ORDER — FUROSEMIDE 40 MG/1
40 TABLET ORAL DAILY
Status: DISCONTINUED | OUTPATIENT
Start: 2018-09-21 | End: 2018-10-02 | Stop reason: HOSPADM

## 2018-09-20 RX ORDER — IBUPROFEN 200 MG
24 TABLET ORAL
Status: DISCONTINUED | OUTPATIENT
Start: 2018-09-20 | End: 2018-10-02 | Stop reason: HOSPADM

## 2018-09-20 RX ORDER — LOSARTAN POTASSIUM 50 MG/1
100 TABLET ORAL DAILY
Status: DISCONTINUED | OUTPATIENT
Start: 2018-09-21 | End: 2018-10-02 | Stop reason: HOSPADM

## 2018-09-20 RX ORDER — SODIUM CHLORIDE 0.9 % (FLUSH) 0.9 %
5 SYRINGE (ML) INJECTION
Status: CANCELLED | OUTPATIENT
Start: 2018-09-20

## 2018-09-20 RX ORDER — PREDNISONE 10 MG/1
10 TABLET ORAL 2 TIMES DAILY
Status: DISCONTINUED | OUTPATIENT
Start: 2018-09-20 | End: 2018-10-02 | Stop reason: HOSPADM

## 2018-09-20 RX ORDER — GLUCAGON 1 MG
1 KIT INJECTION
Status: CANCELLED | OUTPATIENT
Start: 2018-09-20

## 2018-09-20 RX ORDER — PREDNISONE 10 MG/1
10 TABLET ORAL 2 TIMES DAILY
Status: CANCELLED | OUTPATIENT
Start: 2018-09-20

## 2018-09-20 RX ORDER — ASPIRIN 81 MG/1
81 TABLET ORAL DAILY
Status: CANCELLED | OUTPATIENT
Start: 2018-09-21

## 2018-09-20 RX ORDER — DULOXETIN HYDROCHLORIDE 60 MG/1
60 CAPSULE, DELAYED RELEASE ORAL DAILY
Status: DISCONTINUED | OUTPATIENT
Start: 2018-09-21 | End: 2018-10-02 | Stop reason: HOSPADM

## 2018-09-20 RX ORDER — DULOXETIN HYDROCHLORIDE 30 MG/1
60 CAPSULE, DELAYED RELEASE ORAL DAILY
Status: CANCELLED | OUTPATIENT
Start: 2018-09-21

## 2018-09-20 RX ORDER — DILTIAZEM HYDROCHLORIDE 180 MG/1
180 CAPSULE, COATED, EXTENDED RELEASE ORAL DAILY
Status: CANCELLED | OUTPATIENT
Start: 2018-09-21

## 2018-09-20 RX ORDER — IBUPROFEN 200 MG
24 TABLET ORAL
Status: CANCELLED | OUTPATIENT
Start: 2018-09-20

## 2018-09-20 RX ORDER — INSULIN ASPART 100 [IU]/ML
0-5 INJECTION, SOLUTION INTRAVENOUS; SUBCUTANEOUS
Status: CANCELLED | OUTPATIENT
Start: 2018-09-20

## 2018-09-20 RX ORDER — IBUPROFEN 200 MG
16 TABLET ORAL
Status: DISCONTINUED | OUTPATIENT
Start: 2018-09-20 | End: 2018-10-02 | Stop reason: HOSPADM

## 2018-09-20 RX ORDER — ASPIRIN 81 MG/1
81 TABLET ORAL DAILY
Status: DISCONTINUED | OUTPATIENT
Start: 2018-09-21 | End: 2018-10-02 | Stop reason: HOSPADM

## 2018-09-20 RX ORDER — CLOPIDOGREL BISULFATE 75 MG/1
75 TABLET ORAL DAILY
Status: DISCONTINUED | OUTPATIENT
Start: 2018-09-21 | End: 2018-10-02 | Stop reason: HOSPADM

## 2018-09-20 RX ORDER — ACETAMINOPHEN 325 MG/1
650 TABLET ORAL EVERY 4 HOURS PRN
Status: DISCONTINUED | OUTPATIENT
Start: 2018-09-20 | End: 2018-10-02 | Stop reason: HOSPADM

## 2018-09-20 RX ORDER — CLOPIDOGREL BISULFATE 75 MG/1
75 TABLET ORAL DAILY
Status: CANCELLED | OUTPATIENT
Start: 2018-09-21

## 2018-09-20 RX ORDER — SIMVASTATIN 20 MG/1
40 TABLET, FILM COATED ORAL NIGHTLY
Status: CANCELLED | OUTPATIENT
Start: 2018-09-20

## 2018-09-20 RX ORDER — IBUPROFEN 200 MG
16 TABLET ORAL
Status: CANCELLED | OUTPATIENT
Start: 2018-09-20

## 2018-09-20 RX ORDER — AMOXICILLIN 250 MG
1 CAPSULE ORAL 2 TIMES DAILY
Status: CANCELLED | OUTPATIENT
Start: 2018-09-20

## 2018-09-20 RX ORDER — MEGESTROL ACETATE 20 MG/1
40 TABLET ORAL 2 TIMES DAILY
Status: DISCONTINUED | OUTPATIENT
Start: 2018-09-20 | End: 2018-09-22

## 2018-09-20 RX ORDER — ACETAMINOPHEN 325 MG/1
650 TABLET ORAL EVERY 6 HOURS PRN
Status: DISCONTINUED | OUTPATIENT
Start: 2018-09-20 | End: 2018-10-02 | Stop reason: HOSPADM

## 2018-09-20 RX ORDER — RAMELTEON 8 MG/1
8 TABLET ORAL NIGHTLY PRN
Status: DISCONTINUED | OUTPATIENT
Start: 2018-09-20 | End: 2018-09-20 | Stop reason: SDUPTHER

## 2018-09-20 RX ORDER — ENOXAPARIN SODIUM 100 MG/ML
40 INJECTION SUBCUTANEOUS EVERY 24 HOURS
Status: CANCELLED | OUTPATIENT
Start: 2018-09-20

## 2018-09-20 RX ORDER — FUROSEMIDE 40 MG/1
40 TABLET ORAL DAILY
Status: CANCELLED | OUTPATIENT
Start: 2018-09-21

## 2018-09-20 RX ORDER — DILTIAZEM HYDROCHLORIDE 180 MG/1
180 CAPSULE, COATED, EXTENDED RELEASE ORAL DAILY
Status: DISCONTINUED | OUTPATIENT
Start: 2018-09-21 | End: 2018-10-02 | Stop reason: HOSPADM

## 2018-09-20 RX ORDER — ACETAMINOPHEN 325 MG/1
650 TABLET ORAL EVERY 6 HOURS PRN
Status: CANCELLED | OUTPATIENT
Start: 2018-09-20

## 2018-09-20 RX ORDER — SIMETHICONE 80 MG
160 TABLET,CHEWABLE ORAL EVERY 6 HOURS PRN
Status: DISCONTINUED | OUTPATIENT
Start: 2018-09-20 | End: 2018-10-02 | Stop reason: HOSPADM

## 2018-09-20 RX ORDER — ONDANSETRON 2 MG/ML
4 INJECTION INTRAMUSCULAR; INTRAVENOUS EVERY 6 HOURS PRN
Status: DISCONTINUED | OUTPATIENT
Start: 2018-09-20 | End: 2018-09-21

## 2018-09-20 RX ORDER — POTASSIUM CHLORIDE 20 MEQ/1
20 TABLET, EXTENDED RELEASE ORAL ONCE
Status: COMPLETED | OUTPATIENT
Start: 2018-09-20 | End: 2018-09-20

## 2018-09-20 RX ORDER — CALCIUM CARBONATE 200(500)MG
500 TABLET,CHEWABLE ORAL 2 TIMES DAILY PRN
Status: CANCELLED | OUTPATIENT
Start: 2018-09-20

## 2018-09-20 RX ORDER — GLUCAGON 1 MG
1 KIT INJECTION
Status: DISCONTINUED | OUTPATIENT
Start: 2018-09-20 | End: 2018-10-02 | Stop reason: HOSPADM

## 2018-09-20 RX ORDER — RAMELTEON 8 MG/1
8 TABLET ORAL NIGHTLY PRN
Status: DISCONTINUED | OUTPATIENT
Start: 2018-09-20 | End: 2018-10-02 | Stop reason: HOSPADM

## 2018-09-20 RX ORDER — ALBUTEROL SULFATE 90 UG/1
2 AEROSOL, METERED RESPIRATORY (INHALATION) EVERY 4 HOURS PRN
Status: DISCONTINUED | OUTPATIENT
Start: 2018-09-20 | End: 2018-09-21

## 2018-09-20 RX ORDER — ZINC OXIDE 20 G/100G
OINTMENT TOPICAL
Status: CANCELLED | OUTPATIENT
Start: 2018-09-20

## 2018-09-20 RX ORDER — AMOXICILLIN 250 MG
1 CAPSULE ORAL 2 TIMES DAILY
Status: DISCONTINUED | OUTPATIENT
Start: 2018-09-20 | End: 2018-10-02 | Stop reason: HOSPADM

## 2018-09-20 RX ORDER — PANTOPRAZOLE SODIUM 40 MG/1
40 TABLET, DELAYED RELEASE ORAL DAILY
Status: CANCELLED | OUTPATIENT
Start: 2018-09-21

## 2018-09-20 RX ORDER — PRAZOSIN HYDROCHLORIDE 1 MG/1
5 CAPSULE ORAL 2 TIMES DAILY
Status: CANCELLED | OUTPATIENT
Start: 2018-09-20

## 2018-09-20 RX ORDER — ACETAMINOPHEN 325 MG/1
650 TABLET ORAL EVERY 4 HOURS PRN
Status: CANCELLED | OUTPATIENT
Start: 2018-09-20

## 2018-09-20 RX ORDER — PANTOPRAZOLE SODIUM 40 MG/1
40 TABLET, DELAYED RELEASE ORAL DAILY
Status: DISCONTINUED | OUTPATIENT
Start: 2018-09-21 | End: 2018-10-02 | Stop reason: HOSPADM

## 2018-09-20 RX ORDER — IPRATROPIUM BROMIDE AND ALBUTEROL SULFATE 2.5; .5 MG/3ML; MG/3ML
3 SOLUTION RESPIRATORY (INHALATION) EVERY 4 HOURS
Status: CANCELLED | OUTPATIENT
Start: 2018-09-20

## 2018-09-20 RX ORDER — LOSARTAN POTASSIUM 50 MG/1
100 TABLET ORAL DAILY
Status: CANCELLED | OUTPATIENT
Start: 2018-09-21

## 2018-09-20 RX ORDER — SIMETHICONE 125 MG
125 TABLET,CHEWABLE ORAL EVERY 6 HOURS PRN
Status: CANCELLED | OUTPATIENT
Start: 2018-09-20

## 2018-09-20 RX ORDER — MEGESTROL ACETATE 40 MG/1
40 TABLET ORAL 2 TIMES DAILY
Status: CANCELLED | OUTPATIENT
Start: 2018-09-20

## 2018-09-20 RX ADMIN — DULOXETINE 60 MG: 30 CAPSULE, DELAYED RELEASE ORAL at 09:09

## 2018-09-20 RX ADMIN — MEGESTROL ACETATE 40 MG: 40 TABLET ORAL at 09:09

## 2018-09-20 RX ADMIN — SIMVASTATIN 40 MG: 40 TABLET, FILM COATED ORAL at 10:09

## 2018-09-20 RX ADMIN — PRAZOSIN HYDROCHLORIDE 5 MG: 1 CAPSULE ORAL at 09:09

## 2018-09-20 RX ADMIN — LACTOBACILLUS TAB 4 TABLET: TAB at 12:09

## 2018-09-20 RX ADMIN — THEOPHYLLINE ANHYDROUS 200 MG: 100 CAPSULE, EXTENDED RELEASE ORAL at 09:09

## 2018-09-20 RX ADMIN — IPRATROPIUM BROMIDE AND ALBUTEROL SULFATE 3 ML: .5; 3 SOLUTION RESPIRATORY (INHALATION) at 07:09

## 2018-09-20 RX ADMIN — POTASSIUM CHLORIDE 20 MEQ: 20 TABLET, EXTENDED RELEASE ORAL at 02:09

## 2018-09-20 RX ADMIN — PREDNISONE 10 MG: 10 TABLET ORAL at 10:09

## 2018-09-20 RX ADMIN — PRAZOSIN HYDROCHLORIDE 5 MG: 5 CAPSULE ORAL at 10:09

## 2018-09-20 RX ADMIN — ERTAPENEM SODIUM 1 G: 1 INJECTION, POWDER, LYOPHILIZED, FOR SOLUTION INTRAMUSCULAR; INTRAVENOUS at 10:09

## 2018-09-20 RX ADMIN — PREDNISONE 10 MG: 10 TABLET ORAL at 09:09

## 2018-09-20 RX ADMIN — VANCOMYCIN HYDROCHLORIDE 750 MG: 750 INJECTION, POWDER, LYOPHILIZED, FOR SOLUTION INTRAVENOUS at 05:09

## 2018-09-20 RX ADMIN — DILTIAZEM HYDROCHLORIDE 180 MG: 180 CAPSULE, COATED, EXTENDED RELEASE ORAL at 09:09

## 2018-09-20 RX ADMIN — Medication 1 CAPSULE: at 10:09

## 2018-09-20 RX ADMIN — MEGESTROL ACETATE 40 MG: 20 TABLET ORAL at 10:09

## 2018-09-20 RX ADMIN — ACETAMINOPHEN 650 MG: 325 TABLET, FILM COATED ORAL at 05:09

## 2018-09-20 RX ADMIN — IPRATROPIUM BROMIDE AND ALBUTEROL SULFATE 3 ML: .5; 3 SOLUTION RESPIRATORY (INHALATION) at 03:09

## 2018-09-20 RX ADMIN — INSULIN DETEMIR 10 UNITS: 100 INJECTION, SOLUTION SUBCUTANEOUS at 10:09

## 2018-09-20 RX ADMIN — LACTOBACILLUS TAB 4 TABLET: TAB at 09:09

## 2018-09-20 RX ADMIN — FUROSEMIDE 40 MG: 40 TABLET ORAL at 09:09

## 2018-09-20 RX ADMIN — LOSARTAN POTASSIUM 100 MG: 50 TABLET, FILM COATED ORAL at 09:09

## 2018-09-20 RX ADMIN — SIMETHICONE CHEW TAB 80 MG 160 MG: 80 TABLET ORAL at 10:09

## 2018-09-20 RX ADMIN — DOCUSATE SODIUM -SENNOSIDES 1 TABLET: 50; 8.6 TABLET, COATED ORAL at 10:09

## 2018-09-20 RX ADMIN — CLOPIDOGREL BISULFATE 75 MG: 75 TABLET ORAL at 09:09

## 2018-09-20 RX ADMIN — PANTOPRAZOLE SODIUM 40 MG: 40 TABLET, DELAYED RELEASE ORAL at 09:09

## 2018-09-20 RX ADMIN — IPRATROPIUM BROMIDE AND ALBUTEROL SULFATE 3 ML: .5; 3 SOLUTION RESPIRATORY (INHALATION) at 08:09

## 2018-09-20 RX ADMIN — IPRATROPIUM BROMIDE AND ALBUTEROL SULFATE 3 ML: .5; 3 SOLUTION RESPIRATORY (INHALATION) at 11:09

## 2018-09-20 RX ADMIN — ASPIRIN 81 MG: 81 TABLET, COATED ORAL at 09:09

## 2018-09-20 NOTE — NURSING
Pt ran SVTs for 30 seconds with a HR of 220. Pt was distressed  and upset because she wanted to get back in the bed and was not able to do it herself.Pt back in the bed and ok at this time.dr Wick notified and aware.

## 2018-09-20 NOTE — PLAN OF CARE
Problem: Patient Care Overview  Goal: Plan of Care Review  Outcome: Ongoing (interventions implemented as appropriate)  Plan of care reviewed with patient. Patient voiced understanding. NS-ST on monitor spiking up to 120s on monitor. No acute distress noted. Pt encouraged to shift weight. Side rails x 3, bed in lowest position, call bell within reach, pt advised to call for assistance. Maintain bed alarm for patient safety.

## 2018-09-20 NOTE — NURSING
Pt discharging to [facility] for skilled services. Report called to Patricia. Pt changed and new attend applied. Raeann here to transport pt by wheelchair with belongings.

## 2018-09-20 NOTE — PT/OT/SLP PROGRESS
Occupational Therapy   Treatment    Name: Sheryl Martines  MRN: [id number]  Admitting Diagnosis:  Adult failure to thrive       Recommendations:     Discharge Recommendations: nursing facility, skilled  Discharge Equipment Recommendations:  walker, rolling  Barriers to discharge:  Decreased caregiver support    Subjective     Communicated with: RN prior to session.  Pain/Comfort:  · Pain Rating 1: 0/10  · Pain Rating Post-Intervention 1: 0/10    Patients cultural, spiritual, Protestant conflicts given the current situation:      Objective:     Patient found with: bed alarm, telemetry, peripheral IV, oxygen    General Precautions: Standard, fall   Orthopedic Precautions:N/A   Braces: N/A     Occupational Performance:    Bed Mobility:    · Patient completed Rolling/Turning to Left with  stand by assistance  · Patient completed Scooting/Bridging with stand by assistance  · Patient completed Supine to Sit with stand by assistance     Functional Mobility/Transfers:  · Patient completed Sit <> Stand Transfer with contact guard assistance  with  rolling walker   · Patient completed Bed <> Chair Transfer using Stand Pivot technique with contact guard assistance and minimum assistance with rolling walker  · Functional Mobility: Pt took 4 steps to bedside chair with CGA-Min A for RW mgmt and safety. Pt becomes anxious in standing, quickly trying to get to chair.     Activities of Daily Living:  · Grooming: modified independence chair level.  Pt unable to stand at sink 2/2 to decreased standing tolerance.  · Lower Body Dressing: stand by assistance sitting EOB and pulling leg into bed to reach feet.     Patient left up in chair with all lines intact, call button in reach, chair alarm on and RN notified    AMPAC 6 Click:  AMPAC Total Score: 16    Treatment & Education:  Pt requires increased time and frequent rest breaks throughout tx to complete tasks secondary to decreased endurance, weakness and SOB.  Sit<>stand with CGA from  chair x 2 trials tolerating static stand x 10 sec, 30 sec with CGA for standing balance.   Education:    Assessment:     Sheryl Martines is a 63 y.o. female with a medical diagnosis of Adult failure to thrive.  She presents with decreased overall strength, endurance, balance and [place] and safety with ADL's and fx mobility.  Performance deficits affecting function are weakness, impaired endurance, impaired self care skills, impaired functional mobilty, gait instability, impaired balance, decreased upper extremity function, decreased lower extremity function, decreased safety awareness, pain, impaired fine motor, impaired coordination, impaired cardiopulmonary response to activity.  Pt requires increased time and frequent rest breaks throughout tx to complete tasks secondary to decreased endurance, weakness and SOB. Pt transferring to chair with CGA-Min A using RW.  Slow progress. Continue OT services to address functional goals, progressing as able.      Rehab Prognosis:  good; patient would benefit from acute skilled OT services to address these deficits and reach maximum level of function.       Plan:     Patient to be seen 5 x/week to address the above listed problems via self-care/home management, therapeutic activities, therapeutic exercises  · Plan of Care Expires: 10/13/18  · Plan of Care Reviewed with: patient    This Plan of care has been discussed with the patient who was involved in its development and understands and is in agreement with the identified goals and treatment plan    GOALS:   Multidisciplinary Problems     Occupational Therapy Goals        Problem: Occupational Therapy Goal    Goal Priority Disciplines Outcome Interventions   Occupational Therapy Goal     OT, PT/OT Ongoing (interventions implemented as appropriate)    Description:  Goals to be met by: 10/13/18     Patient will increase functional independence with ADLs by performing:    LE Dressing with Minimal Assistance.  Grooming  while seated with Stand-by Assistance.  Toileting from bedside commode with Minimal Assistance for hygiene and clothing management.   Supine to sit with Modified Indep.  Stand pivot transfers with Contact Guard Assistance.  Toilet transfer to bedside commode with Contact Guard Assistance.  Increased functional strength to WFL for self care skills and functional mobility.  Upper extremity exercise program x10 reps per handout, with independence.                       Time Tracking:     OT Date of Treatment: 09/20/18  OT Start Time: 0945  OT Stop Time: 1013  OT Total Time (min): 28 min    Billable Minutes:Therapeutic Activity 28    MANOJ Gannon  9/20/2018

## 2018-09-20 NOTE — HOSPITAL COURSE
64yo female with PMHx of Asthma, Closed compression fracture of fourth lumbar vertebra, COPD, CAD, DM, glaucoma, high cholesterol, HTN, iritis, PE, and stroke presented to the ED with decreased PO intake, nausea without emesis, watery diarrhea, weakness, and abdominal incisional pain s/p apply. On presentation she was taking vanc/flagyl/clinida. CT abdomen in the ED should no acute changes. Patient was admitted for adult failure to thrive, and transferred to ICU for labored breaking and hypotension. She was found to have a UTI and she was placed on vanc and zosyn. On 9/12/18 patient became normotensive and surgery and ID were consulted. ID reccommended continuing Vanc and Zosyn. Patient's H/H dropped to 7.4/30.8 and she was transfused with 2U PRBCs on 9/13/18. Repeat H/H following transfusion was 9.6/30.8. On 9/14/18 patient was restarted on home diltiazem, her BP remained normotensive and she was transferred to the floor. Abdominal ultrasound was performed and showed cholelithiasis and hepatic steatosis. Per ID recommendations abx were changed to ertapenem and vancomycin on 9/14/18 after seeing abdominal fluid collection on imaging, with the aim to have patient on abx for 4 weeks and then re-image as an outpatient prior to stopping abx. During her hospital stay, patient's blood sugars remained well controlled, she was on 10U of Determir and SSI throughout stay. Patient remained stable and was accepted at [facility]. ID and surgery also agreed that patient was stable to discharge to SNF and that she should follow up with both in the outpatient setting.

## 2018-09-20 NOTE — PLAN OF CARE
Patient to be discharged to [facility] today.    Future Appointments   Date Time Provider Department Center   9/25/2018  2:30 PM Bernadine Melendrez MD [facility]   10/3/2018  3:00 PM INFECTIOUS DISEASE, [facility] INFECT [facility]     Follow-up With  Details  Why  Contact Info   Bernadine Melendrez MD  On 9/25/2018  at 2:30 pm--Surgery Follow-Up  [address]  [address]  [facility] [address]  [phone]   Ej Al MD  On 10/3/2018  at 3:00 pm--Infectious Disease Follow-Up  [address]  [address]  [facility] [address]  [phone]   [facility]  Go today  [facility]  [address] [place] [address]  [phone]      09/20/18 1200   Final Note   Assessment Type Final Discharge Note   Discharge Disposition SNF   What phone number can be called within the next 1-3 days to see how you are doing after discharge? [phone]   Hospital Follow Up  Appt(s) scheduled? Yes   Right Care Referral Info   Post Acute Recommendation SNF / Sub-Acute Rehab   Referral Type SNF   Facility Name [facility]     Vicky Tran RN  Transition Navigator  [phone]

## 2018-09-20 NOTE — PROGRESS NOTES
Progress Note  [facility]  Admit Date: 9/10/2018   LOS: 9 days   SUBJECTIVE:     NAEON.  Afebrile.  Pain well controlled.  Being transfer to [facility] as they can accommodate port.    ROS   No CP, SOB, mild abdom pain at incision site.    OBJECTIVE:   Vital Signs (Most Recent)  Temp: 96.5 °F (35.8 °C) (09/20/18 0800)  Pulse: 89 (09/20/18 0800)  Resp: 16 (09/20/18 0724)  BP: (!) 141/75 (09/20/18 0800)  SpO2: 97 % (09/20/18 0800)    I & O (Last 24H):    Intake/Output Summary (Last 24 hours) at 9/20/2018 0938  Last data filed at 9/20/2018 0600  Gross per 24 hour   Intake 780 ml   Output 622 ml   Net 158 ml     Wt Readings from Last 3 Encounters:   09/20/18 72.2 kg (159 lb 2.8 oz)   09/05/18 88.9 kg (195 lb 15.8 oz)   08/14/18 81.6 kg (180 lb)       Current Diet Order   Procedures    Diet diabetic [facility]; 2000 Calorie; Low Sodium,2gm, Cardiac (Low Na/Chol)     Order Specific Question:   Indicate patient location for additional diet options:     Answer:   [facility]     Order Specific Question:   Total calories:     Answer:   2000 Calorie     Order Specific Question:   Additional Diet Options:     Answer:   Low Sodium,2gm     Order Specific Question:   Additional Diet Options:     Answer:   Cardiac (Low Na/Chol)        Physical Exam   Constitutional: She is oriented to person, place, and time. No distress.   HENT:   Head: Normocephalic.   Eyes: Conjunctivae are normal.   Neck: Normal range of motion.   Cardiovascular: Normal rate, regular rhythm and normal heart sounds.   No murmur heard.  Pulmonary/Chest: Breath sounds normal. No respiratory distress. She has no wheezes. She has no rales.   Port in place and accessed, no skin changes, no tenderness   Abdominal: Soft. Bowel sounds are normal. There is tenderness (mild incisional pain). There is no rebound.   Wound healing, slightly TTP at incision site, bandage C/d/i   Musculoskeletal: Normal range of motion. She exhibits no edema.    Neurological: She is alert and oriented to person, place, and time.   Skin: Skin is warm and dry. She is not diaphoretic. No erythema.   Psychiatric: Affect and judgment normal.   Nursing note and vitals reviewed.    Laboratory Data:  CBC  Recent Labs   Lab  09/18/18 0520 09/19/18   0530  09/20/18   0505   WBC  6.71  6.28  6.95   RBC  3.84*  4.10  4.17   HGB  10.2*  10.7*  10.8*   HCT  33.1*  35.1*  35.7*   PLT  168  173  179   MCV  86  86  86   MCH  26.6*  26.1*  25.9*   MCHC  30.8*  30.5*  30.3*     CMP  Recent Labs   Lab  09/18/18   0520 09/19/18   0530  09/20/18   0505   CALCIUM  8.8  9.1  8.8   PROT  5.5*  5.7*  5.7*   NA  139  141  140   K  3.7  3.5  3.3*   CO2  38*  36*  34*   CL  98  98  98   BUN  7*  9  12   CREATININE  0.7  0.8  0.9   ALKPHOS  98  98  91   ALT  9*  10  10   AST  14  14  14   BILITOT  0.4  0.5  0.5     POCT-Glucose  POCT Glucose   Date Value Ref Range Status   09/20/2018 136 (H) 70 - 110 mg/dL Final   09/19/2018 158 (H) 70 - 110 mg/dL Final   09/19/2018 126 (H) 70 - 110 mg/dL Final   09/19/2018 131 (H) 70 - 110 mg/dL Final   09/19/2018 129 (H) 70 - 110 mg/dL Final   09/18/2018 143 (H) 70 - 110 mg/dL Final   09/18/2018 120 (H) 70 - 110 mg/dL Final   09/18/2018 138 (H) 70 - 110 mg/dL Final   09/18/2018 85 70 - 110 mg/dL Final   09/17/2018 88 70 - 110 mg/dL Final   09/17/2018 120 (H) 70 - 110 mg/dL Final   09/17/2018 101 70 - 110 mg/dL Final     COAGS  No results for input(s): PT, INR, APTT in the last 168 hours.  MICRO  Microbiology Results (last 7 days)     Procedure Component Value Units Date/Time    Blood culture [[id number]] Collected:  09/11/18 1249    Order Status:  Completed Specimen:  Blood Updated:  09/16/18 1812     Blood Culture, Routine No growth after 5 days.    Blood culture [[id number]] Collected:  09/11/18 1248    Order Status:  Completed Specimen:  Blood Updated:  09/16/18 1812     Blood Culture, Routine No growth after 5 days.        LIPID PANEL  Lab Results   Component  Value Date    CHOL 159 08/23/2018     Lab Results   Component Value Date    HDL 35 (L) 08/23/2018     Lab Results   Component Value Date    LDLCALC 86.6 08/23/2018     Lab Results   Component Value Date    TRIG 187 (H) 08/23/2018     Lab Results   Component Value Date    CHOLHDL 22.0 08/23/2018       Diagnostic Results:  Imaging in last 24 hours:     ASSESSMENT/PLAN:   Sheryl Martines is a 63 y.o. 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 here almost 3 weeks s/p ruptured appy with subsequent post-op changes on CT scan (no surgical intevention), MRSA + in wound and failure to thrive    MRSA wound infecti/on  Cultures + for MRSA  Contact precautions  Cont on Ertapenem, vanc-   Will need 2-4 weeks of abx with outpatient reimaging per ID at 2 weeks and then see ID    UTI on admission  S/p 3 days vanc, zosyn  Cultures no growth, asymptomatic    Iron deficient anemia  2/p transfusion 2 units 9/13, H/H responded appropriately  No signs of active bleed  Stable    Failure to thrive  PO intake improving  Boost-glucose with all meals    Diabetes  Determir 10 BID, sliding scale.  Stable  Glucoses well controlled ()    GI ppx: protonix  VTE prophylaxis: lovenox  PT/OT/IS/ambulate    Dispo:  Likely discharge today    9/20/2018 Julian Carmichael MD  9:30 AM

## 2018-09-20 NOTE — PLAN OF CARE
Problem: Patient Care Overview  Goal: Plan of Care Review  Outcome: Ongoing (interventions implemented as appropriate)  The proper method of use, as well as anticipated side effects, of this aerosol treatment are discussed and demonstrated to the patient. Patient performing 750 ml on incentive spirometer. O2 saturation 100% on nasal cannula .5 lpm. Will continue to monitor.

## 2018-09-20 NOTE — PROGRESS NOTES
"Vancomycin Dosing and Monitoring Pharmacy Protocol    Sheryl Martines is a 63 y.o. female    Height: 5' 1" (1.549 m)   Wt Readings from Last 3 Encounters:   09/20/18 72.2 kg (159 lb 2.8 oz)   09/05/18 88.9 kg (195 lb 15.8 oz)   08/14/18 81.6 kg (180 lb)     Ideal body weight: 47.8 kg (105 lb 6.1 oz)  Adjusted ideal body weight: 57.6 kg (126 lb 14.3 oz)    Temp Readings from Last 3 Encounters:   09/20/18 98 °F (36.7 °C) (Oral)   09/05/18 96.3 °F (35.7 °C)   08/14/18 97.1 °F (36.2 °C) (Oral)      Lab Results   Component Value Date/Time    WBC 6.95 09/20/2018 05:05 AM    WBC 6.28 09/19/2018 05:30 AM    WBC 6.71 09/18/2018 05:20 AM      Lab Results   Component Value Date/Time    CREATININE 0.9 09/20/2018 05:05 AM    CREATININE 0.8 09/19/2018 05:30 AM    CREATININE 0.7 09/18/2018 05:20 AM      Lab Results   Component Value Date/Time    LACTATE 1.5 09/11/2018 02:22 PM    LACTATE 2.3 (H) 09/11/2018 01:34 AM    LACTATE 1.1 08/27/2018 07:45 AM       Serum creatinine: 0.9 mg/dL 09/20/18 0505  Estimated creatinine clearance: 58.2 mL/min    Antibiotics (From admission, onward)      Start     Stop Route Frequency Ordered    09/14/18 2000  ertapenem (INVANZ) 1 g in sodium chloride 0.9 % 100 mL IVPB (ready to mix system)      -- IV Every 24 hours (non-standard times) 09/14/18 1648    09/14/18 1730  vancomycin 750 mg in dextrose 5 % 250 mL IVPB (ready to mix system)      -- IV Every 12 hours (non-standard times) 09/14/18 0725          Antifungals (From admission, onward)      None            Microbiology Results (last 7 days)       Procedure Component Value Units Date/Time    Blood culture [[id number]] Collected:  09/11/18 1249    Order Status:  Completed Specimen:  Blood Updated:  09/16/18 1812     Blood Culture, Routine No growth after 5 days.    Blood culture [[id number]] Collected:  09/11/18 1248    Order Status:  Completed Specimen:  Blood Updated:  09/16/18 1812     Blood Culture, Routine No growth after 5 days.      "       Indication/Target trough:   Sepsis (Target trough: 15-20mcg/ml)    Hemodialysis:   N/A    Dosing Weight:   AdjBW--adjusted body weight  If ABW is greater than or equal to 30% over Ideal Body Weight, AdjBW will be used to calculate vancomycin dose.    Last Vancomycin dose: 750 mg   Date/Time given: 524          Vancomycin level:  Recent Labs   Lab Result Units  18   Vancomycin-Trough ug/mL  14.8     Recent Labs   Lab Result Units  18   05   Vancomycin-Trough ug/mL  14.8       Per Protocol Initial/Adjustments Dosin. Initial/Adjustment Dose: NO CHANGE, continue the same Vancomycin dose of 750mg q12hr  2. Vancomycin Trough Level will be drawn on  1630date/time    Pharmacy will continue to follow.    Please contact if you have any further questions. Thank you.    Jamil Willett, PharmD  [phone]

## 2018-09-20 NOTE — PT/OT/SLP DISCHARGE
Occupational Therapy Discharge Summary    Sheryl Martines  MRN: [id number]   Principal Problem: Adult failure to thrive      Patient Discharged from acute Occupational Therapy on 9/20.  Please refer to prior OT note dated 9/20 for functional status.    Assessment:      Patient appropriate for care in another setting.    Objective:     GOALS:   Multidisciplinary Problems     Occupational Therapy Goals     Not on file          Multidisciplinary Problems (Resolved)        Problem: Occupational Therapy Goal    Goal Priority Disciplines Outcome Interventions   Occupational Therapy Goal   (Resolved)     OT, PT/OT Outcome(s) achieved    Description:  Goals to be met by: 10/13/18     Patient will increase functional independence with ADLs by performing:    LE Dressing with Minimal Assistance.  Grooming while seated with Stand-by Assistance.  Toileting from bedside commode with Minimal Assistance for hygiene and clothing management.   Supine to sit with Modified Indep.  Stand pivot transfers with Contact Guard Assistance.  Toilet transfer to bedside commode with Contact Guard Assistance.  Increased functional strength to WFL for self care skills and functional mobility.  Upper extremity exercise program x10 reps per handout, with independence.                       Reasons for Discontinuation of Therapy Services  Transfer to alternate level of care.      Plan:     Patient Discharged to: Skilled Nursing Facility    Fernie Acuña OT  9/20/2018

## 2018-09-20 NOTE — PLAN OF CARE
spoke with Cathy at [facility] to verify they are able to accommodate patient's port ( to receive her abx) and if the patient has copay.Cathy reported they are able to accommodate this and she is approved for 7 days. Her insurance covers 20 days at $0 copay. Cathy will have to resubmit to patient's insurance for additional days.     informed the attending team of this and requested discharge readmit orders be placed and to refer to Cathy's note to call MD report.

## 2018-09-20 NOTE — HPI
Per Dr Fernández: 63 y.o. 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 Still having continous waterry diarrhea. Patient said she doesn't feel good. Feels nauseated but no emesis. She had popsicle am and nothing else. She is able to keep fluid down but has no appetite and felt nauseated everytime she ate. She is having pain at the incision site, reported increased drainage. Took temperature at home, was 100.4.   She has diarrhea all day since discharge.

## 2018-09-20 NOTE — DISCHARGE SUMMARY
[facility] Hospital Medicine  Discharge Summary      Patient Name: Sheryl Martines  MRN: [id number]  Admission Date: 9/10/2018  Hospital Length of Stay: 9 days  Discharge Date and Time:  09/20/2018 1:14 PM  Attending Physician: Cassy Wick MD   Discharging Provider: Julian Carmichael MD  Primary Care Provider: Primary Doctor No      HPI:   Per Dr Fernández: 63 y.o. 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 Still having continous waterry diarrhea. Patient said she doesn't feel good. Feels nauseated but no emesis. She had popsicle am and nothing else. She is able to keep fluid down but has no appetite and felt nauseated everytime she ate. She is having pain at the incision site, reported increased drainage. Took temperature at home, was 100.4.   She has diarrhea all day since discharge.     Hospital Course:   62yo female with PMHx of Asthma, Closed compression fracture of fourth lumbar vertebra, COPD, CAD, DM, glaucoma, high cholesterol, HTN, iritis, PE, and stroke presented to the ED with decreased PO intake, nausea without emesis, watery diarrhea, weakness, and abdominal incisional pain s/p apply. On presentation she was taking PO vanc/flagyl/clinida from the prior discharge.    CT abdomen in the ED should no acute changes. Patient was admitted for adult failure to thrive.  While on the sacha, pt developed labored breathing and was transferred to ICU for respiratory distress and hypotension. She was found to have a UTI from the ED and she was placed on vanc and zosyn. On 9/12/18 patient became normotensive and surgery and ID were consulted. ID reccommended continuing Vanc and Zosyn. Patient's H/H dropped to 7.4/30.8 and she was transfused with 2U PRBCs on 9/13/18. Repeat H/H following transfusion was 9.6/30.8.     On 9/14/18 patient was restarted on home diltiazem, her  BP remained normotensive and she was transferred to the floor. Abdominal ultrasound was performed and showed cholelithiasis and hepatic steatosis.     Per ID recommendations abx were changed to ertapenem and vancomycin on 9/14/18 after seeing abdominal fluid collection on imaging, with the aim to have patient on abx for 4 weeks and re-image as an outpatient after 2 weeks prior to stopping abx.     Pt had a port placed approx 10 years ago in [place] for IV sticks that is positional but works when pt is sitting up.  [facility] accepted patient and was willing to use the port for access.    During her hospital stay, patient's blood sugars remained well controlled, she was on 10U of Determir and SSI throughout stay. Patient remained stable and was accepted at [facility]. ID and surgery also agreed that patient was stable to discharge to SNF and that she should follow up with both in the outpatient setting.        Consults:   Consults (From admission, onward)        Status Ordering Provider     Inpatient consult to Anesthesiology  Once     Provider:  Mildred Barboza MD    Acknowledged MILTON COUGHLIN     Inpatient consult to General Surgery  Once     Provider:  Bernadine Melendrez MD    Acknowledged HUBERT ORTEGA     Inpatient consult to Infectious Diseases  Once     Provider:  Virginia Gutierrez MD    Completed ZORA MONCADA     Inpatient consult to Registered Dietitian/Nutritionist  Once     Provider:  (Not yet assigned)    Completed MILTON COUGHLIN     Inpatient consult to Registered Dietitian/Nutritionist  Once     Provider:  (Not yet assigned)    Completed MILTON COUGHLIN     Inpatient consult to [facility]  Once     Provider:  (Not yet assigned)    Acknowledged SAVANNAH PICKARD          No new Assessment & Plan notes have been filed under this hospital service since the last note was generated.  Service: Hospital Medicine    Final Active Diagnoses:    Diagnosis Date Noted POA     PRINCIPAL PROBLEM:  Adult failure to thrive [R62.7] 09/18/2018 Yes    Atelectasis [J98.11]  Yes    Debilitated patient [R53.81]  Yes    S/P appendectomy [Z90.49] 09/11/2018 Not Applicable    Decreased oral intake [R63.8] 09/11/2018 Yes    Moderate asthma without complication [J45.909] 09/11/2018 Yes    HFrEF (heart failure with reduced ejection fraction) [I50.20] 09/11/2018 Yes    Clostridium difficile infection [B96.89] 08/30/2018 Yes    DM (diabetes mellitus) [E11.9] 08/23/2018 Yes    CAD (coronary artery disease) [I25.10] 08/23/2018 Yes    CVA, old, hemiparesis [I69.359] 08/23/2018 Not Applicable      Problems Resolved During this Admission:    Diagnosis Date Noted Date Resolved POA    Intestinal infection due to Clostridium difficile [A04.72] 09/13/2018 09/20/2018 Yes    Sepsis due to other etiology [A41.89] 09/12/2018 09/20/2018 Yes    Hypotension [I95.9]  09/20/2018 Yes    Tachypnea [R06.82]  09/20/2018 Yes    Fever [R50.9] 09/11/2018 09/20/2018 Yes    Hypoxia [R09.02] 09/11/2018 09/20/2018 Yes    Hypertension associated with diabetes [E11.59, I10] 08/23/2018 09/20/2018 No       Discharged Condition: stable    Disposition: Skilled Nursing Facility    Follow Up:  Follow-up Information     Bernadine Melendrez MD On 9/25/2018.    Specialties:  General Surgery, Surgery  Why:  at 2:30 pm--Surgery Follow-Up  Contact information:  [address]  [address]  [facility] [address] [phone]             Ej Al MD On 10/3/2018.    Specialties:  Infectious Diseases, Hospice and Palliative Medicine  Why:  at 3:00 pm--Infectious Disease Follow-Up  Contact information:  [address]  [address]  [facility] [id number] [phone]             [facility]. Go today.    Why:  SNF  Contact information:  [address] [place] [address]  [phone]               Patient Instructions:   No discharge procedures on file.    Significant Diagnostic Studies: Labs:   BMP:   Recent Labs    Lab  09/19/18   0530  09/20/18   0505   GLU  121*  121*   NA  141  140   K  3.5  3.3*   CL  98  98   CO2  36*  34*   BUN  9  12   CREATININE  0.8  0.9   CALCIUM  9.1  8.8   MG  1.8  1.8   , CMP   Recent Labs   Lab  09/19/18   0530  09/20/18   0505   NA  141  140   K  3.5  3.3*   CL  98  98   CO2  36*  34*   GLU  121*  121*   BUN  9  12   CREATININE  0.8  0.9   CALCIUM  9.1  8.8   PROT  5.7*  5.7*   ALBUMIN  2.6*  2.6*   BILITOT  0.5  0.5   ALKPHOS  98  91   AST  14  14   ALT  10  10   ANIONGAP  7*  8   ESTGFRAFRICA  >60  >60   EGFRNONAA  >60  >60   , CBC   Recent Labs   Lab  09/19/18   0530  09/20/18   0505   WBC  6.28  6.95   HGB  10.7*  10.8*   HCT  35.1*  35.7*   PLT  173  179   , INR   Lab Results   Component Value Date    INR 1.1 09/10/2018    INR 0.9 08/23/2018   , Lipid Panel   Lab Results   Component Value Date    CHOL 159 08/23/2018    HDL 35 (L) 08/23/2018    LDLCALC 86.6 08/23/2018    TRIG 187 (H) 08/23/2018    CHOLHDL 22.0 08/23/2018   , Troponin No results for input(s): TROPONINI in the last 168 hours. and A1C:   Recent Labs   Lab  08/23/18   0217   HGBA1C  8.1*     Radiology: X-Ray: CXR: X-Ray Chest 1 View (CXR):   Results for orders placed or performed during the hospital encounter of 09/10/18   X-Ray Chest 1 View    Narrative    EXAMINATION:  XR CHEST 1 VIEW    CLINICAL HISTORY:  central line placement;    TECHNIQUE:  Single frontal view of the chest was performed.    COMPARISON:  Chest radiograph and CT thorax earlier same day    FINDINGS:  Monitoring leads overlie the chest.  Patient is slightly rotated.  Interval placement of left IJ CVC with tip projected over the cavoatrial junction region.  No large pneumothorax or new focal opacity.  Otherwise, grossly stable radiographic appearance of the chest.      Impression    As above.      Electronically signed by: Román Echavarria MD  Date:    09/11/2018  Time:    21:28     Ultrasound: 9/14/18  1. Hepatic steatosis.  2. Cholelithiasis.    Pending  Diagnostic Studies:     None         Medications:  Reconciled Home Medications:      Medication List      ASK your doctor about these medications    ADVAIR DISKUS 500-50 mcg/dose Dsdv diskus inhaler  Generic drug:  fluticasone-salmeterol 500-50 mcg/dose  Inhale 1 puff into the lungs 2 (two) times daily. Controller     albuterol 0.63 mg/3 mL Nebu  Commonly known as:  ACCUNEB  Take 0.83 mg by nebulization every 6 (six) hours as needed. Rescue     aspirin 81 MG EC tablet  Commonly known as:  ECOTRIN  Take 1 tablet (81 mg total) by mouth once daily.     baclofen 10 MG tablet  Commonly known as:  LIORESAL  Take 10 mg by mouth 3 (three) times daily.     clindamycin 300 MG capsule  Commonly known as:  CLEOCIN  Take 1 capsule (300 mg total) by mouth 2 (two) times daily. for 7 days  Ask about: Should I take this medication?     cloNIDine 0.2 mg/24 hr td ptwk 0.2 mg/24 hr  Commonly known as:  CATAPRES  Place 1 patch onto the skin every 7 days.     clopidogrel 75 mg tablet  Commonly known as:  PLAVIX  Take 75 mg by mouth once daily.     COZAAR ORAL  Take 100 mg by mouth.     diclofenac sodium 1 % Gel  Commonly known as:  VOLTAREN  Apply topically once daily. for 10 days as directed     diltiaZEM 180 MG 24 hr capsule  Commonly known as:  CARDIZEM CD  Take 180 mg by mouth once daily.     DULoxetine 60 MG capsule  Commonly known as:  CYMBALTA  Take 60 mg by mouth once daily.     furosemide 40 MG tablet  Commonly known as:  LASIX  Take 40 mg by mouth once daily.     gabapentin 300 MG capsule  Commonly known as:  NEURONTIN  Take 300 mg by mouth 3 (three) times daily.     HUMALOG PEN SUBQ  Inject into the skin.     insulin detemir U-100 100 unit/mL injection  Commonly known as:  LEVEMIR  Inject 20 Units into the skin 2 (two) times daily.     JANUVIA ORAL  Take by mouth.     lansoprazole 30 MG capsule  Commonly known as:  PREVACID  Take 30 mg by mouth once daily.     metroNIDAZOLE 500 MG tablet  Commonly known as:  FLAGYL  Take 1  tablet (500 mg total) by mouth every 8 (eight) hours. for 9 days  Ask about: Should I take this medication?     ondansetron 4 MG tablet  Commonly known as:  ZOFRAN  Take 1 tablet (4 mg total) by mouth every 6 (six) hours as needed.     prazosin 5 MG capsule  Commonly known as:  MINIPRESS  Take 5 mg by mouth 2 (two) times daily.     predniSONE 10 MG tablet  Commonly known as:  DELTASONE  Take 10 mg by mouth 2 (two) times daily.     simvastatin 40 MG tablet  Commonly known as:  ZOCOR  Take 40 mg by mouth every evening.     * theophylline 200 MG 12 hr tablet  Commonly known as:  THEODUR  Take 200 mg by mouth once daily.     * UNIPHYL ORAL  Take by mouth.     traMADol 100 MG Tb24  Commonly known as:  ULTRAM-ER  Take 50 mg by mouth daily as needed.     vancomycin 25 mg/mL solution  Take 20 mLs (500 mg total) by mouth every 6 (six) hours. for 9 days  Ask about: Should I take this medication?     VITAMIN B-6 50 MG Tab  Generic drug:  pyridoxine (vitamin B6)  Take 50 mg by mouth once daily.         * This list has 2 medication(s) that are the same as other medications prescribed for you. Read the directions carefully, and ask your doctor or other care provider to review them with you.                Indwelling Lines/Drains at time of discharge:   Lines/Drains/Airways     Central Venous Catheter Line                 Port A Cath Single Lumen right subclavian -- days          Pressure Ulcer                 Pressure Injury 09/11/18 1515 medial Sacral spine Stage 2 8 days                Time spent on the discharge of patient: 30 minutes  Patient was seen and examined on the date of discharge and determined to be suitable for discharge.         Julian Carmichael MD  Department of Hospital Medicine  [facility]

## 2018-09-20 NOTE — PLAN OF CARE
[facility]    FACILITY TRANSFER ORDERS      Patient Name: Sheryl Martines  YOB: 1955    PCP: Primary Doctor No   PCP Address: None  PCP Phone Number: None  PCP Fax: None    Encounter Date: 09/20/2018    Admit to: [facility]    Vital Signs:  Routine    Diagnoses:   Active Hospital Problems    Diagnosis  POA    *Adult failure to thrive [R62.7]  Yes    Atelectasis [J98.11]  Yes    Debilitated patient [R53.81]  Yes    S/P appendectomy [Z90.49]  Not Applicable    Decreased oral intake [R63.8]  Yes    Moderate asthma without complication [J45.909]  Yes    HFrEF (heart failure with reduced ejection fraction) [I50.20]  Yes    Clostridium difficile infection [B96.89]  Yes    DM (diabetes mellitus) [E11.9]  Yes    CAD (coronary artery disease) [I25.10]  Yes    CVA, old, hemiparesis [I69.359]  Not Applicable      Resolved Hospital Problems    Diagnosis Date Resolved POA    Intestinal infection due to Clostridium difficile [A04.72] 09/20/2018 Yes    Sepsis due to other etiology [A41.89] 09/20/2018 Yes    Hypotension [I95.9] 09/20/2018 Yes    Tachypnea [R06.82] 09/20/2018 Yes    Fever [R50.9] 09/20/2018 Yes    Hypoxia [R09.02] 09/20/2018 Yes    Hypertension associated with diabetes [E11.59, I10] 09/20/2018 No       Allergies:  Review of patient's allergies indicates:   Allergen Reactions    Ace inhibitors Swelling    Corticosteroids (glucocorticoids)     Hydralazine analogues     Tetracyclines Swelling    Travatan (with benzalkonium) [travoprost (benzalkonium)]        Diet: diabetic diet: 2000 calorie    Activities: Activity as tolerated    Nursing: Routine     Labs: CBC, CMP and CRP Once 9/27/2018    CONSULTS:    Physical Therapy to evaluate and treat. , Occupational Therapy to evaluate and treat. and  to evaluate for community resources/long-range planning.     Infectious disease follow-up in 2 weeks after CT scan to stop antibiotics (currently end date will be  10/20/2018)    Surgery follow-up with Dr. Melendrez    MISCELLANEOUS CARE:  Routine Skin for Bedridden Patients: Apply moisture barrier cream to all skin folds and wet areas in perineal area daily and after baths and all bowel movements. and Diabetes Care:   SN to perform and educate Diabetic management with blood glucose monitoring:, Fingerstick blood sugar AC and HS and Report CBG < 60 or > 350 to physician.    WOUND CARE ORDERS  Yes: Surgical Wound:  Location: Right lower quadrant.  No dressing needed.  Keep clean and dry    Consult ET nurse     Irrigate with saline or wound spray.     Medications: Review discharge medications with patient and family and provide education.      Current Discharge Medication List      Invanz 1 gram in NaCl 100 ml IVPB q 24 hours. Stop date 10/20/18    Vancomycin 750 mg in D5 250% ml IVPB q 12 hours.  Stop date 10/20/18      Details   aspirin (ECOTRIN) 81 MG EC tablet Take 1 tablet (81 mg total) by mouth once daily.  Qty: 30 tablet, Refills: 11      clopidogrel (PLAVIX) 75 mg tablet Take 75 mg by mouth once daily.      diltiaZEM (CARDIZEM CD) 180 MG 24 hr capsule Take 180 mg by mouth once daily.      DULoxetine (CYMBALTA) 60 MG capsule Take 60 mg by mouth once daily.      fluticasone-salmeterol 500-50 mcg/dose (ADVAIR DISKUS) 500-50 mcg/dose DsDv diskus inhaler Inhale 1 puff into the lungs 2 (two) times daily. Controller      furosemide (LASIX) 40 MG tablet Take 40 mg by mouth once daily.       gabapentin (NEURONTIN) 300 MG capsule Take 300 mg by mouth 3 (three) times daily.      lansoprazole (PREVACID) 30 MG capsule Take 30 mg by mouth once daily.      losartan potassium (COZAAR ORAL) Take 100 mg by mouth daily.      ondansetron (ZOFRAN) 4 MG tablet Take 1 tablet (4 mg total) by mouth every 6 (six) hours as needed.  Qty: 30 tablet, Refills: 1      prazosin (MINIPRESS) 5 MG capsule Take 5 mg by mouth 2 (two) times daily.       predniSONE (DELTASONE) 10 MG tablet Take 10 mg by mouth 2  (two) times daily.       pyridoxine, vitamin B6, (VITAMIN B-6) 50 MG Tab Take 50 mg by mouth once daily.      simvastatin (ZOCOR) 40 MG tablet Take 40 mg by mouth every evening.      theophylline (THEODUR) 200 MG 12 hr tablet Take 200 mg by mouth once daily.       albuterol (ACCUNEB) 0.63 mg/3 mL Nebu Take 0.83 mg by nebulization every 6 (six) hours as needed. Rescue       diclofenac sodium 1 % Gel Apply topically once daily back pain for 10 days as directed  Qty: 100 g, Refills: 2      insulin detemir U-100 (LEVEMIR) 100 unit/mL injection Inject 10 Units into the skin 2 (two) times daily.                _________________________________  Julian Carmichael MD  09/20/2018

## 2018-09-20 NOTE — PLAN OF CARE
Problem: Patient Care Overview  Goal: Plan of Care Review  Outcome: Outcome(s) achieved Date Met: 09/20/18  Plan of care reviewed with patient. Verbalizes to staff understanding.Pt up sitting in her chair at bedside today. Continues on 02 at 2L per nasal canula. SOB observed during activity. Needs encouragement with self care and meals. Independent with meals after staff set up. Negar cath in place and activated. Dressing  Clean dry and intact. Discussed discharge with pt and pt is ok with SNF for therapy. Continues on IV ABT with 0 adverse side effects noted.Offered pt something for pain but continues to decline pain meds. NSR on monitor with 0 red alarms noted.  No acute distress observed at this time. Side rails x2, bed in low position, call bell within reach. Bed alarm in place for patient safety. Will relay to oncoming nurse to monitor for changes in condition.

## 2018-09-20 NOTE — PLAN OF CARE
Problem: Patient Care Overview  Goal: Plan of Care Review  Outcome: Ongoing (interventions implemented as appropriate)  Received patient upon rounds, at 1910H, Patient seen in bed conscious, coherent 4x, on semifowler's position. With portacath on the right anterior chest wall, flushed patent, with clean and dry dressing  Intact. With abdominal incision site on the left lower abdomen with sanguinous non odorous discharge, cleansed with saline, dry gauge and non adhesive dressing applied. With oxygen  saturation of 94-96 percent on room air. Plan of care reviewed with patient  Patient noted understanding NSR on  tele monitor 70-80's. No acute distress noted. Reposition  patient Q2hrs. . Side rails x 2, bed in lowest position, call bell within reach, pt advised to call for assistance.PAtient uses bedpan.  Maintain bed alarm for patient safety. Blood sugar monitored, insulin administered. Abdominal pain occasionally, pain medications PRN administered.  Will continue to monitor patient.

## 2018-09-20 NOTE — PLAN OF CARE
Problem: Occupational Therapy Goal  Goal: Occupational Therapy Goal  Goals to be met by: 10/13/18     Patient will increase functional independence with ADLs by performing:    LE Dressing with Minimal Assistance.  Grooming while seated with Stand-by Assistance.  Toileting from bedside commode with Minimal Assistance for hygiene and clothing management.   Supine to sit with Modified Indep.  Stand pivot transfers with Contact Guard Assistance.  Toilet transfer to bedside commode with Contact Guard Assistance.  Increased functional strength to WFL for self care skills and functional mobility.  Upper extremity exercise program x10 reps per handout, with independence.      Outcome: Ongoing (interventions implemented as appropriate)  Sheryl Martines is a 63 y.o. female with a medical diagnosis of Adult failure to thrive.  She presents with decreased overall strength, endurance, balance and [place] and safety with ADL's and fx mobility.  Performance deficits affecting function are weakness, impaired endurance, impaired self care skills, impaired functional mobilty, gait instability, impaired balance, decreased upper extremity function, decreased lower extremity function, decreased safety awareness, pain, impaired fine motor, impaired coordination, impaired cardiopulmonary response to activity.  Pt requires increased time and frequent rest breaks throughout tx to complete tasks secondary to decreased endurance, weakness and SOB. Pt transferring to chair with CGA-Min A using RW.  Slow progress. Continue OT services to address functional goals, progressing as able.      BRITTANY Gannon/L

## 2018-09-21 ENCOUNTER — PATIENT OUTREACH (OUTPATIENT)
Dept: ADMINISTRATIVE | Facility: CLINIC | Age: 63
End: 2018-09-21

## 2018-09-21 PROBLEM — L89.302 DECUBITUS ULCER OF BUTTOCK, STAGE 2: Status: ACTIVE | Noted: 2018-09-21

## 2018-09-21 PROBLEM — K21.9 GASTROESOPHAGEAL REFLUX DISEASE WITHOUT ESOPHAGITIS: Status: ACTIVE | Noted: 2018-09-21

## 2018-09-21 PROBLEM — E44.0 MODERATE MALNUTRITION: Status: ACTIVE | Noted: 2018-09-21

## 2018-09-21 PROBLEM — I10 ESSENTIAL HYPERTENSION: Status: ACTIVE | Noted: 2018-09-21

## 2018-09-21 LAB
POCT GLUCOSE: 104 MG/DL (ref 70–110)
POCT GLUCOSE: 120 MG/DL (ref 70–110)
POCT GLUCOSE: 219 MG/DL (ref 70–110)
POCT GLUCOSE: 78 MG/DL (ref 70–110)

## 2018-09-21 PROCEDURE — 25000003 PHARM REV CODE 250: Performed by: NURSE PRACTITIONER

## 2018-09-21 PROCEDURE — 97161 PT EVAL LOW COMPLEX 20 MIN: CPT

## 2018-09-21 PROCEDURE — 99306 1ST NF CARE HIGH MDM 50: CPT | Mod: ,,, | Performed by: HOSPITALIST

## 2018-09-21 PROCEDURE — 99900035 HC TECH TIME PER 15 MIN (STAT)

## 2018-09-21 PROCEDURE — 97116 GAIT TRAINING THERAPY: CPT

## 2018-09-21 PROCEDURE — 63600175 PHARM REV CODE 636 W HCPCS: Performed by: STUDENT IN AN ORGANIZED HEALTH CARE EDUCATION/TRAINING PROGRAM

## 2018-09-21 PROCEDURE — 25000242 PHARM REV CODE 250 ALT 637 W/ HCPCS: Performed by: STUDENT IN AN ORGANIZED HEALTH CARE EDUCATION/TRAINING PROGRAM

## 2018-09-21 PROCEDURE — 94799 UNLISTED PULMONARY SVC/PX: CPT

## 2018-09-21 PROCEDURE — 94761 N-INVAS EAR/PLS OXIMETRY MLT: CPT

## 2018-09-21 PROCEDURE — 97530 THERAPEUTIC ACTIVITIES: CPT

## 2018-09-21 PROCEDURE — 97535 SELF CARE MNGMENT TRAINING: CPT

## 2018-09-21 PROCEDURE — 63700000 PHARM REV CODE 250 ALT 637 W/O HCPCS: Performed by: STUDENT IN AN ORGANIZED HEALTH CARE EDUCATION/TRAINING PROGRAM

## 2018-09-21 PROCEDURE — 11000004 HC SNF PRIVATE

## 2018-09-21 PROCEDURE — 97802 MEDICAL NUTRITION INDIV IN: CPT

## 2018-09-21 PROCEDURE — 94640 AIRWAY INHALATION TREATMENT: CPT

## 2018-09-21 PROCEDURE — 25000003 PHARM REV CODE 250: Performed by: STUDENT IN AN ORGANIZED HEALTH CARE EDUCATION/TRAINING PROGRAM

## 2018-09-21 PROCEDURE — 97165 OT EVAL LOW COMPLEX 30 MIN: CPT

## 2018-09-21 RX ORDER — ONDANSETRON 4 MG/1
4 TABLET, FILM COATED ORAL ONCE
Status: DISCONTINUED | OUTPATIENT
Start: 2018-09-22 | End: 2018-10-02 | Stop reason: HOSPADM

## 2018-09-21 RX ORDER — IPRATROPIUM BROMIDE AND ALBUTEROL SULFATE 2.5; .5 MG/3ML; MG/3ML
3 SOLUTION RESPIRATORY (INHALATION) EVERY 6 HOURS PRN
Status: DISCONTINUED | OUTPATIENT
Start: 2018-09-22 | End: 2018-10-02 | Stop reason: HOSPADM

## 2018-09-21 RX ADMIN — PRAZOSIN HYDROCHLORIDE 5 MG: 5 CAPSULE ORAL at 08:09

## 2018-09-21 RX ADMIN — ZINC OXIDE: 200 OINTMENT TOPICAL at 12:09

## 2018-09-21 RX ADMIN — IPRATROPIUM BROMIDE AND ALBUTEROL SULFATE 3 ML: .5; 3 SOLUTION RESPIRATORY (INHALATION) at 07:09

## 2018-09-21 RX ADMIN — SIMVASTATIN 40 MG: 40 TABLET, FILM COATED ORAL at 08:09

## 2018-09-21 RX ADMIN — PREDNISONE 10 MG: 10 TABLET ORAL at 08:09

## 2018-09-21 RX ADMIN — INSULIN DETEMIR 10 UNITS: 100 INJECTION, SOLUTION SUBCUTANEOUS at 08:09

## 2018-09-21 RX ADMIN — LOSARTAN POTASSIUM 100 MG: 50 TABLET, FILM COATED ORAL at 11:09

## 2018-09-21 RX ADMIN — VANCOMYCIN HYDROCHLORIDE 750 MG: 750 INJECTION, POWDER, LYOPHILIZED, FOR SOLUTION INTRAVENOUS at 05:09

## 2018-09-21 RX ADMIN — ERTAPENEM SODIUM 1 G: 1 INJECTION, POWDER, LYOPHILIZED, FOR SOLUTION INTRAMUSCULAR; INTRAVENOUS at 08:09

## 2018-09-21 RX ADMIN — PANTOPRAZOLE SODIUM 40 MG: 40 TABLET, DELAYED RELEASE ORAL at 11:09

## 2018-09-21 RX ADMIN — FUROSEMIDE 40 MG: 40 TABLET ORAL at 11:09

## 2018-09-21 RX ADMIN — ASPIRIN 81 MG: 81 TABLET, COATED ORAL at 11:09

## 2018-09-21 RX ADMIN — PRAZOSIN HYDROCHLORIDE 5 MG: 5 CAPSULE ORAL at 12:09

## 2018-09-21 RX ADMIN — ENOXAPARIN SODIUM 40 MG: 100 INJECTION SUBCUTANEOUS at 04:09

## 2018-09-21 RX ADMIN — Medication 1 CAPSULE: at 12:09

## 2018-09-21 RX ADMIN — CLOPIDOGREL BISULFATE 75 MG: 75 TABLET ORAL at 11:09

## 2018-09-21 RX ADMIN — MEGESTROL ACETATE 40 MG: 20 TABLET ORAL at 08:09

## 2018-09-21 RX ADMIN — IPRATROPIUM BROMIDE AND ALBUTEROL SULFATE 3 ML: .5; 3 SOLUTION RESPIRATORY (INHALATION) at 03:09

## 2018-09-21 RX ADMIN — MEGESTROL ACETATE 40 MG: 20 TABLET ORAL at 11:09

## 2018-09-21 RX ADMIN — IPRATROPIUM BROMIDE AND ALBUTEROL SULFATE 3 ML: .5; 3 SOLUTION RESPIRATORY (INHALATION) at 11:09

## 2018-09-21 RX ADMIN — INSULIN ASPART 1 UNITS: 100 INJECTION, SOLUTION INTRAVENOUS; SUBCUTANEOUS at 08:09

## 2018-09-21 RX ADMIN — INSULIN DETEMIR 10 UNITS: 100 INJECTION, SOLUTION SUBCUTANEOUS at 12:09

## 2018-09-21 RX ADMIN — DILTIAZEM HYDROCHLORIDE 180 MG: 180 CAPSULE, COATED, EXTENDED RELEASE ORAL at 11:09

## 2018-09-21 RX ADMIN — DULOXETINE 60 MG: 60 CAPSULE, DELAYED RELEASE ORAL at 11:09

## 2018-09-21 RX ADMIN — THEOPHYLLINE ANHYDROUS 200 MG: 100 CAPSULE, EXTENDED RELEASE ORAL at 11:09

## 2018-09-21 RX ADMIN — Medication 1 CAPSULE: at 08:09

## 2018-09-21 RX ADMIN — PREDNISONE 10 MG: 10 TABLET ORAL at 11:09

## 2018-09-21 NOTE — CONSULTS
"  [facility] - Skilled Nursing Care  Adult Nutrition  Consult Note    SUMMARY     Recommendations    Recommendation/Intervention: 1. Continue 2000 diabetic; cardiac diet 2. Boost breeze TID 3. Encourage po and honor food preferences  Goals: po >75% to meet EEN and EPN in next 72 hours  Nutrition Goal Status: new  Communication of RD Recs: other (comment)(POC)    Reason for Assessment    Reason for Assessment: consult  Diagnosis: (debility)  Relevant Medical History: s/p appendectomy; DM; HTN; CAD; stroke; FTT; nausea; pulmonary embolus  Interdisciplinary Rounds: did not attend  General Information Comments: Pt reports fair appetite, increasing po ~50%. Reported 20 lb wt loss since hospital admission (1 month) 2/2 decreased appetite, nausea and vomiting. Pt willing to try boost breeze ONS. Pt reports N/V and some loose stools. Encouraging oral intake. NFPE completed on 18, results below.   Nutrition Discharge Planning: d/c diabetic; cardiac with ONS of choice    Nutrition Risk Screen    Nutrition Risk Screen: no indicators present    Nutrition/Diet History    Patient Reported Diet/Restrictions/Preferences: general  Typical Food/Fluid Intake: decreased oral intake; pt was requesting hamburger and ice cream for lunch  Do you have any cultural, spiritual, Latter-day conflicts, given your current situation?: none reported  Factors Affecting Nutritional Intake: decreased appetite, nausea/vomiting    Anthropometrics    Temp: 98.7 °F (37.1 °C)  Height Method: Stated  Height: 5' 1" (154.9 cm)  Height (inches): 61 in  Weight Method: Bed Scale  Weight: 73.4 kg (161 lb 13.1 oz)  Weight (lb): 161.82 lb  Ideal Body Weight (IBW), Female: 105 lb  % Ideal Body Weight, Female (lb): 153.68 lb  BMI (Calculated): 30.6  BMI Grade: 30 - 34.9- obesity - grade I  Weight Loss: unintentional  Usual Body Weight (UBW), k.27 kg  % Usual Body Weight: 89.4       Lab/Procedures/Meds    Pertinent Labs Reviewed: reviewed  Pertinent Labs " Comments: K 3.3; Glucose 121; POCT glucose 119; Albumin 2.6  Pertinent Medications Reviewed: reviewed  Pertinent Medications Comments: diltiazem; enoxaparin; furosemide; insulin; lactobacillus; losartan; megestrol; pantoprazole; prednisone; senna-docusate; statin    Physical Findings/Assessment  NFPE completed on 9/21/18  Mild muscle loss of clavicle, temple and hands  Overall Physical Appearance: loss of muscle mass, overweight  Tubes: (-)  Oral/Mouth Cavity: tooth/teeth missing  Skin: edema    Estimated/Assessed Needs    Weight Used For Calorie Calculations: 73.4 kg (161 lb 13.1 oz)  Energy Calorie Requirements (kcal): 1600 kcal  Energy Need Method: [place]-St Jeor((x1.3))  Protein Requirements: 74-88 gm((1.0-1.2gm/kg))  Weight Used For Protein Calculations: 73.4 kg (161 lb 13.1 oz)  Fluid Requirements (mL): or per MD  Fluid Need Method: RDA Method  RDA Method (mL): 1600  CHO Requirement: -      Nutrition Prescription Ordered    Current Diet Order: 2000 diabetic; cardiac low Na  Oral Nutrition Supplement: Boost breeze TID    Evaluation of Received Nutrient/Fluid Intake    Energy Calories Required: not meeting needs  Protein Required: not meeting needs  Fluid Required: not meeting needs  Tolerance: tolerating  % Intake of Estimated Energy Needs: 25 - 50 %  % Meal Intake: 25 - 50 %    Nutrition Risk    Level of Risk/Frequency of Follow-up: high     Assessment and Plan    Moderate Malnutrition in the context of Acute Illness/Injury    Related to (etiology):  Decreased intake 2/2 nausea/vomiting and s/p appendectomy    Signs and Symptoms (as evidenced by):  Energy Intake: <75% of estimated energy requirement for >1 week  Muscle Mass Depletion: moderate depletion of temples, clavicle region and interosseous muscle   Weight Loss: 11.5% x 1 week    Interventions/Recommendations (treatment strategy):  Encourage po; continue 2000 diabetic; cardiac diet; Boost breeze TID; maintain/gain wt    Nutrition Diagnosis  Status:  New    Monitor and Evaluation    Food and Nutrient Intake: energy intake, food and beverage intake  Food and Nutrient Adminstration: diet order  Physical Activity and Function: nutrition-related ADLs and IADLs  Anthropometric Measurements: weight, weight change  Biochemical Data, Medical Tests and Procedures: electrolyte and renal panel, gastrointestinal profile, glucose/endocrine profile, inflammatory profile, lipid profile  Nutrition-Focused Physical Findings: overall appearance     Nutrition Follow-Up    RD Follow-up?: Yes

## 2018-09-21 NOTE — PT/OT/SLP DISCHARGE
Physical Therapy Discharge Summary    Name: Sheryl Martines  MRN: [id number]   Principal Problem: Adult failure to thrive     Patient Discharged from acute Physical Therapy on 18.  Please refer to prior PT noted date on 18 for functional status.     Assessment:     Patient appropriate for care in another setting.    Objective:     GOALS:   Multidisciplinary Problems     Physical Therapy Goals     Not on file          Multidisciplinary Problems (Resolved)        Problem: Physical Therapy Goal    Goal Priority Disciplines Outcome Goal Variances Interventions   Physical Therapy Goal   (Resolved)     PT, PT/OT Outcome(s) achieved     Description:  Goals to be met by: 10/11/2018     Patient will increase functional independence with mobility by performin. Supine to sit with Stand-by Assistance  2. Sit to stand transfer with Stand-by Assistance  3. Bed to chair transfer with Stand-by Assistance using Rolling Walker  4. Gait  x 12 feet with Stand-by Assistance using Rolling Walker.                       Reasons for Discontinuation of Therapy Services  Transfer to alternate level of care.      Plan:     Patient Discharged to: Skilled Nursing Facility.    Ileana Wang, PT  2018

## 2018-09-21 NOTE — NURSING
Received pt lying supine. Nadn, zero sob. Pt denies c/p, h/a. N/v and cough. Zero complaints @ present. Pt's bbs cl, pos bs x 4 quad. She has a rt sc port. Taped and secured in place. drsg noted to the rlq, is  cdi, zero drainage or foul odor noted. Call light w/i reach. All distal pulses are palpable. Assessment completed per epic flow sheet. Will monitor

## 2018-09-21 NOTE — PT/OT/SLP EVAL
Occupational Therapy  Evaluation/tx    Sheryl Martines   MRN: [id number]   Admitting Diagnosis: Infected incision s/p appendectomy (8/26/18)/FTT        OT Date of Treatment: 09/21/18   OT Start Time: 0848  OT Stop Time: 0942  OT Total Time (min): 54 min    Billable Minutes:  Evaluation 12  Self Care/Home Management 42    Diagnosis: Infected incision s/p appendectomy (8/26/18)/FTT        Past Medical History:   Diagnosis Date    Asthma     Closed compression fracture of fourth lumbar vertebra     COPD (chronic obstructive pulmonary disease)     Coronary artery disease     Diabetes mellitus     Glaucoma     High cholesterol     Hypertension     Iritis     Pulmonary embolus     Stroke     rt sided weakness.      Past Surgical History:   Procedure Laterality Date    ABDOMINAL SURGERY      APPENDECTOMY N/A 8/26/2018    Procedure: APPENDECTOMY;  Surgeon: Bernadine Melendrez MD;  Location: [facility] OR;  Service: General;  Laterality: N/A;    APPENDECTOMY N/A 8/26/2018    Performed by Bernadine Melendrez MD at [facility] OR    APPENDECTOMY, LAPAROSCOPIC---CONVERTED TO OPEN APPENDECTOMY @0950 N/A 8/26/2018    Performed by Bernadine Melendrez MD at [facility] OR    BACK SURGERY      stimulator    CATARACT EXTRACTION      HYSTERECTOMY      LAPAROSCOPIC APPENDECTOMY N/A 8/26/2018    Procedure: APPENDECTOMY, LAPAROSCOPIC---CONVERTED TO OPEN APPENDECTOMY @0950;  Surgeon: Bernadine Melendrez MD;  Location: [facility] OR;  Service: General;  Laterality: N/A;         General Precautions: Standard, contact, fall  Orthopedic Precautions:    Braces:      Do you have any cultural, spiritual, Restoration conflicts, given your current situation?: none reported     Patient History:  Lives With: child(nandini), adult  Living Arrangements: house  Home Accessibility: stairs to enter home  Home Layout: Able to live on 1st floor  Number of Stairs to Enter Home: 2  Stair Railings at Home: none  Equipment Currently Used at Home: shower chair, rollator,  bedside commode(needs to be confirmed)    Prior level of function:   Bed Mobility/Transfers: needs device  Grooming: independent  Bathing: (shower chair )  Upper Body Dressing: needs device  Lower Body Dressing: needs device  Toileting: needs device  Home Management Skills: unable to perform(since most recent hospitalization; prior to that per chart)  IADL Comments: Per chart:  Pt. lives with daughter in ss house with 1 step to enter. Since last admit pt. has required assist with ADL tasks.  Pt. did ambulate with a rollator per chart.  Prior to last hospitilization pt. was Mod I with ADL tasks and was watching her 3 year old grand child .  Daughter works during the day. Pt. Very sleepy on OT eval so information will need to be verified.     Dominant hand: right    Subjective:  Communicated with nurse prior to session.  Give me a minute.  Chief Complaint: I need to go to the bathroom and reported her legs were tender  Patient/Family stated goals: To get better    Pain/Comfort  Pain Rating 1: (did not quantify)  Location - Side 1: Bilateral  Location - Orientation 1: lower  Location 1: leg  Pain Addressed 1: Reposition, Distraction  Pain Rating Post-Intervention 1: (no increases in pain noted)    Objective:   Patient found with: (pt. supine in bed)    Cognitive Exam:  Oriented to: Person and Place  Follows Commands/attention: Follows one-step commands  Communication: clear/fluent  Memory:  Fair  Very sleepy on eval so would not answer some questions   Safety awareness/insight to disability: impaired  Coping skills/emotional control: at times angry during eval and lashing out    Visual/perceptual:  Eyes were red and had drainage on this date    Physical Exam:  Postural examination/scapula alignment:    -       Rounded shoulders  -       Forward head  -       Posterior pelvic tilt  Skin integrity: sx site covered on abdomen  Edema: Mild in BUE hands    Sensation:   Not tested     Upper Extremity Range of Motion:  Right  Upper Extremity: WFL  Left Upper Extremity: limited in shoulder flexion 2/2 old RTC injury    Upper Extremity Strength:  Right Upper Extremity: WFL  Left Upper Extremity: elbow and below WFL; shoulder limited   Strength: fair    Fine motor coordination:   Impaired/shakiness noted in UE when feeding herself     Gross motor coordination: impaired    Functional Status:  MDS G  Bed Mobility Functional Status: mod(A)-  Bed Mobility Level of (A): (supine tosit )  Transfer Functional Status:-Min (A) x 1   Transfer Level of (A): (with RW sit to stand; Min A SPT with RW to bedside commode)  Min A x 2 SPT from bedside commode to bedside chair 2/2 pt. Somewhat confused and sleepy  Dressing Functional Status: 3:-Max(A) LBD to don socks/ UBD Min A to don gown  Eating Functional Status: CGA-Min (A)  Toilet Use Functional Status: Total A on BSC for all aspects  Personal Hygiene Functional Status:SBA seated to wash face  Bathing Functional Status: -Max(A) sponge bath  Eval Only: Number of U/E limb <4/5 MMT: 1  Moving from seated to standing position: Not steady, only able to stabilize with staff assistance  Moving on and off the toilet: Not steady, only able to stabilize with staff assistance  Surface-to-surface transfer (transfer between bed and chair or wheelchair): Not steady, only able to stabilize with staff assistance         AMPA 6 Click:  [facility] Total Score: 14      Additional Treatment:  Pt. Educated on roleof oT and pOC   pt. Educated on safety with transfers and need for staff assist./  Pt. Educated on importance of OOB activity  Pt. Educated on safety with ADL task performance'      Patient left up in chair with call button in reach and nurse notified    Assessment:  Sheryl Martines is a 63 y.o. female with a medical diagnosis of Infected incision  s/p appendectomy (8/26/18) FTT  Pt. Presents with deficits in self-care skills, endurance, mobility and cognition on this date. Pt. Would benefit from continued OT  services.  Pt. noted to be fatigued during evaluation this am. .Pt evaluation falls under low complexity for evaluation coding due to performance deficits noted in 1-3 areas as stated above and 1 co-morbities affecting current functional status. Data obtained from problem focused assessments. Maximum to moderate  modifications or assistance was required for completion of evaluation. Only brief occupational profile and history review completed.       Rehab identified problem list/impairments: impaired endurance, impaired self care skills, impaired functional mobilty, pain, decreased safety awareness, impaired balance, gait instability    Rehab potential is fair    Activity tolerance: Fair    Discharge recommendations: home health OT(and light assist)     Barriers to discharge: Inaccessible home environment, Decreased caregiver support     Equipment recommendations: (TBD)     GOALS:   Multidisciplinary Problems     Occupational Therapy Goals        Problem: Occupational Therapy Goal    Goal Priority Disciplines Outcome Interventions   Occupational Therapy Goal     OT, PT/OT     Description:  Goals to be met by: 2 weeks     Patient will increase functional independence with ADLs by performing:    Feeding with Set-up Assistance.  UE Dressing with Set-up Assistance.  LE Dressing with Minimal Assistance.  Grooming while seated with Set-up Assistance.  Toileting from bedside commode with Minimal Assistance for hygiene and clothing management.   Bathing from  shower chair/bench with Minimal Assistance.  Supine to sit with Supervision.  Stand pivot transfers with Stand-by Assistance.  Toilet transfer to bedside commode with Stand-by Assistance.  Pt.  To be S with HEP for BUE to improve endurance                       PLAN: Patient to be seen 5 x/week to address the above listed problems via self-care/home management, therapeutic activities, therapeutic exercises  Plan of Care expires: 10/21/18  Plan of Care reviewed with:  patient, family    Jazmyne Chin, LOTR  09/21/2018

## 2018-09-21 NOTE — ASSESSMENT & PLAN NOTE
Maintain euvolemia by monitoring I/O's and daily weights.  Fluid restriction (2 liters/24 hours)  Low Na diet  Cont Losartan, lasix, and diltiazem to treat   Patient Vitals for the past 72 hrs (Last 3 readings):   Weight   09/21/18 0624 73.4 kg (161 lb 13.1 oz)   09/20/18 2000 73.2 kg (161 lb 5.7 oz)   09/20/18 1900 73.2 kg (161 lb 5.7 oz)

## 2018-09-21 NOTE — NURSING
Pt admitted to SNF from [place], via wheelchair accompanied by Ambulance transporter. V/s and weight completed and document. Giving report to oncoming nurse.

## 2018-09-21 NOTE — PLAN OF CARE
Problem: Physical Therapy Goal  Goal: Physical Therapy Goal  Goals to be met by: 14 days     Patient will increase functional independence with mobility by performin. Supine to sit with Modified [place]  2. Sit to supine with Modified [place]  3. Sit to stand transfer with Stand-by Assistance using Rolling Walker  4. Bed to chair transfer with Supervision using Rolling Walker  5. Gait  x 80 feet with Stand-by Assistance using Rolling Walker.   6. Ascend/descend 4 steps with no hand rails with moderate assistance or w/ mod assist and appropriate AD  7. Ascend/Descend 4 inch curb step with Stand-by Assistance using Rolling Walker.  8. Stand for 3 minutes with Stand-by Assistance using Rolling Walker  9. Lower extremity exercise program x20 reps per handout, with assistance as needed    Outcome: Ongoing (interventions implemented as appropriate)  PT Initial Evaluation. Follow PT POC.

## 2018-09-21 NOTE — HPI
Chief Complaint/Reason for Admission: Debility    History of Present Illness:  Patient is a 63 y.o. female who has a past medical history of Asthma, Closed compression fracture of fourth lumbar vertebra, COPD, Coronary artery disease, Diabetes mellitus, Glaucoma, High cholesterol, Hypertension, Iritis, Pulmonary embolus, and Stroke presented with appendicitis and underwent open appendectomy surgery on 08/26. Patient tolerated procedure well with no significant post operative complications. Subsequently her wound cultures grew EColi, Enterococcus, and MRSA and she was treated with cipro, flagyl and vancomycin. She was also found to have CDiff and she was treated with oral vancomycin. She then presented with fever and increased drainage from the appendectomy incision site. She then became unstable and was admitted to ICU for severe sepsis. Source of sepsis was not clear and patient started treatment with broad antibiotics to cover abdominal source as well as lung. Surgery had not plans for surgery and recommended long term antibiotics to treat wound infection. ID recommended IV ertapenem and vancomycin for 4 weeks. Patient has been working with PT/OT who recommend SNF for further balance/mobility training.

## 2018-09-21 NOTE — H&P
[facility] - Skilled Nursing Care  Department of [facility] Medicine  History & Physical    Patient Name: Sheryl Martines  MRN: [id number]  Code Status: Prior  Admission Date: 9/20/2018  Attending Physician: Shyam Hannah MD   Primary Care Provider: Primary Doctor No    Subjective:     Principal Problem:Infected incision    No chief complaint on file.       HPI: Chief Complaint/Reason for Admission: Debility    History of Present Illness:  Patient is a 63 y.o. female who has a past medical history of Asthma, Closed compression fracture of fourth lumbar vertebra, COPD, Coronary artery disease, Diabetes mellitus, Glaucoma, High cholesterol, Hypertension, Iritis, Pulmonary embolus, and Stroke presented with appendicitis and underwent open appendectomy surgery on 08/26. Patient tolerated procedure well with no significant post operative complications. Subsequently her wound cultures grew EColi, Enterococcus, and MRSA and she was treated with cipro, flagyl and vancomycin. She was also found to have CDiff and she was treated with oral vancomycin. She then presented with fever and increased drainage from the appendectomy incision site. She then became unstable and was admitted to ICU for severe sepsis. Source of sepsis was not clear and patient started treatment with broad antibiotics to cover abdominal source as well as lung. Surgery had not plans for surgery and recommended long term antibiotics to treat wound infection. ID recommended IV ertapenem and vancomycin for 4 weeks. Patient has been working with PT/OT who recommend SNF for further balance/mobility training.     Past Medical History:   Diagnosis Date    Asthma     Closed compression fracture of fourth lumbar vertebra     COPD (chronic obstructive pulmonary disease)     Coronary artery disease     Diabetes mellitus     Glaucoma     High cholesterol     Hypertension     Iritis     Pulmonary embolus     Stroke     rt sided weakness.       Past Surgical  History:   Procedure Laterality Date    ABDOMINAL SURGERY      APPENDECTOMY N/A 8/26/2018    Procedure: APPENDECTOMY;  Surgeon: Bernadine Melendrez MD;  Location: [facility] OR;  Service: General;  Laterality: N/A;    APPENDECTOMY N/A 8/26/2018    Performed by Bernadine Melendrez MD at [facility] OR    APPENDECTOMY, LAPAROSCOPIC---CONVERTED TO OPEN APPENDECTOMY @0950 N/A 8/26/2018    Performed by Bernadine Melendrez MD at [facility] OR    BACK SURGERY      stimulator    CATARACT EXTRACTION      HYSTERECTOMY      LAPAROSCOPIC APPENDECTOMY N/A 8/26/2018    Procedure: APPENDECTOMY, LAPAROSCOPIC---CONVERTED TO OPEN APPENDECTOMY @0950;  Surgeon: Bernadine Melendrez MD;  Location: [facility] OR;  Service: General;  Laterality: N/A;       Review of patient's allergies indicates:   Allergen Reactions    Ace inhibitors Swelling    Corticosteroids (glucocorticoids)     Hydralazine analogues     Tetracyclines Swelling    Travatan (with benzalkonium) [travoprost (benzalkonium)]        Current Facility-Administered Medications on File Prior to Encounter   Medication    [COMPLETED] potassium chloride SA CR tablet 20 mEq    [DISCONTINUED] 0.9%  NaCl infusion (for blood administration)    [DISCONTINUED] 0.9%  NaCl infusion (for blood administration)    [DISCONTINUED] acetaminophen tablet 650 mg    [DISCONTINUED] albuterol inhaler 2 puff    [DISCONTINUED] albuterol-ipratropium 2.5 mg-0.5 mg/3 mL nebulizer solution 3 mL    [DISCONTINUED] aspirin EC tablet 81 mg    [DISCONTINUED] clopidogrel tablet 75 mg    [DISCONTINUED] dextrose 50% injection 12.5 g    [DISCONTINUED] dextrose 50% injection 25 g    [DISCONTINUED] diltiaZEM 24 hr capsule 180 mg    [DISCONTINUED] DULoxetine DR capsule 60 mg    [DISCONTINUED] enoxaparin injection 40 mg    [DISCONTINUED] ertapenem (INVANZ) 1 g in sodium chloride 0.9 % 100 mL IVPB (ready to mix system)    [DISCONTINUED] furosemide injection 20 mg    [DISCONTINUED] furosemide tablet 40 mg     [DISCONTINUED] glucagon (human recombinant) injection 1 mg    [DISCONTINUED] glucose chewable tablet 16 g    [DISCONTINUED] glucose chewable tablet 24 g    [DISCONTINUED] insulin aspart U-100 pen 0-5 Units    [DISCONTINUED] insulin detemir U-100 pen 10 Units    [DISCONTINUED] Lactobacillus acidoph-L.bulgar 1 million cell tablet 4 tablet    [DISCONTINUED] losartan tablet 100 mg    [DISCONTINUED] megestrol tablet 40 mg    [DISCONTINUED] ondansetron injection 4 mg    [DISCONTINUED] pantoprazole EC tablet 40 mg    [DISCONTINUED] prazosin capsule 5 mg    [DISCONTINUED] predniSONE tablet 10 mg    [DISCONTINUED] ramelteon tablet 8 mg    [DISCONTINUED] simethicone chewable tablet 125 mg    [DISCONTINUED] simvastatin tablet 40 mg    [DISCONTINUED] sodium chloride 0.9% flush 5 mL    [DISCONTINUED] theophylline 24 hr capsule 200 mg    [DISCONTINUED] traMADol tablet 50 mg    [DISCONTINUED] vancomycin 750 mg in dextrose 5 % 250 mL IVPB (ready to mix system)    [DISCONTINUED] zinc oxide 20 % ointment     Current Outpatient Medications on File Prior to Encounter   Medication Sig    albuterol (ACCUNEB) 0.63 mg/3 mL Nebu Take 0.83 mg by nebulization every 6 (six) hours as needed. Rescue     aspirin (ECOTRIN) 81 MG EC tablet Take 1 tablet (81 mg total) by mouth once daily.    baclofen (LIORESAL) 10 MG tablet Take 10 mg by mouth 3 (three) times daily.    cloNIDine 0.2 mg/24 hr td ptwk (CATAPRES) 0.2 mg/24 hr Place 1 patch onto the skin every 7 days.    clopidogrel (PLAVIX) 75 mg tablet Take 75 mg by mouth once daily.    diclofenac sodium 1 % Gel Apply topically once daily. for 10 days as directed    diltiaZEM (CARDIZEM CD) 180 MG 24 hr capsule Take 180 mg by mouth once daily.    DULoxetine (CYMBALTA) 60 MG capsule Take 60 mg by mouth once daily.    fluticasone-salmeterol 500-50 mcg/dose (ADVAIR DISKUS) 500-50 mcg/dose DsDv diskus inhaler Inhale 1 puff into the lungs 2 (two) times daily. Controller     furosemide (LASIX) 40 MG tablet Take 40 mg by mouth once daily.     gabapentin (NEURONTIN) 300 MG capsule Take 300 mg by mouth 3 (three) times daily.    insulin detemir U-100 (LEVEMIR) 100 unit/mL injection Inject 20 Units into the skin 2 (two) times daily.     insulin lispro (HUMALOG PEN SUBQ) Inject into the skin.    lansoprazole (PREVACID) 30 MG capsule Take 30 mg by mouth once daily.    losartan potassium (COZAAR ORAL) Take 100 mg by mouth.     ondansetron (ZOFRAN) 4 MG tablet Take 1 tablet (4 mg total) by mouth every 6 (six) hours as needed.    prazosin (MINIPRESS) 5 MG capsule Take 5 mg by mouth 2 (two) times daily.     predniSONE (DELTASONE) 10 MG tablet Take 10 mg by mouth 2 (two) times daily.     pyridoxine, vitamin B6, (VITAMIN B-6) 50 MG Tab Take 50 mg by mouth once daily.    simvastatin (ZOCOR) 40 MG tablet Take 40 mg by mouth every evening.    sitagliptin phosphate (JANUVIA ORAL) Take by mouth.    theophylline (THEODUR) 200 MG 12 hr tablet Take 200 mg by mouth once daily.     theophylline anhydrous (UNIPHYL ORAL) Take by mouth.    traMADol (ULTRAM-ER) 100 MG Tb24 Take 50 mg by mouth daily as needed.     Family History     Problem Relation (Age of Onset)    Cancer Father    Diabetes Brother        Tobacco Use    Smoking status: Never Smoker   Substance and Sexual Activity    Alcohol use: No     Frequency: Never    Drug use: No    Sexual activity: No     Partners: Male     Review of Systems   Constitutional: Negative for chills, fatigue and fever.   HENT: Negative for congestion, facial swelling, hearing loss and trouble swallowing.    Eyes: Negative for photophobia and visual disturbance.   Respiratory: Negative for chest tightness, shortness of breath and wheezing.    Cardiovascular: Negative for chest pain, palpitations and leg swelling.   Gastrointestinal: Negative for abdominal pain, blood in stool, constipation, diarrhea, nausea and vomiting.   Endocrine: Negative.     Genitourinary: Negative.    Musculoskeletal: Negative for back pain, joint swelling and myalgias.   Skin: Negative.    Allergic/Immunologic: Negative.    Neurological: Negative for dizziness, facial asymmetry, speech difficulty, weakness and numbness.   Hematological: Negative.    Psychiatric/Behavioral: Negative for agitation, confusion and dysphoric mood. The patient is not nervous/anxious.      Objective:     Vital Signs (Most Recent):  Temp: 98.7 °F (37.1 °C) (09/21/18 1144)  Pulse: 103 (09/21/18 1144)  Resp: 20 (09/21/18 1144)  BP: (!) 154/80 (09/21/18 1144)  SpO2: 96 % (09/21/18 1144) Vital Signs (24h Range):  Temp:  [97.2 °F (36.2 °C)-98.9 °F (37.2 °C)] 98.7 °F (37.1 °C)  Pulse:  [] 103  Resp:  [16-20] 20  SpO2:  [96 %-100 %] 96 %  BP: (138-154)/(79-80) 154/80     Weight: 73.4 kg (161 lb 13.1 oz)  Body mass index is 30.58 kg/m².    Physical Exam   Constitutional: She is oriented to person, place, and time. Vital signs are normal. She appears well-developed and well-nourished. She is cooperative.  Non-toxic appearance. She does not appear ill. No distress.   Morbidly obese    HENT:   Head: Normocephalic and atraumatic.   Eyes: Conjunctivae and EOM are normal. Pupils are equal, round, and reactive to light. No scleral icterus.   Neck: Normal range of motion and full passive range of motion without pain. Neck supple. No JVD present. Carotid bruit is not present. No thyromegaly present.   Cardiovascular: Normal rate, regular rhythm, S1 normal, S2 normal, normal heart sounds and normal pulses. Exam reveals no gallop, no S3, no S4 and no friction rub.   No murmur heard.      Pulmonary/Chest: Effort normal and breath sounds normal. No accessory muscle usage. No tachypnea. No respiratory distress. She has no decreased breath sounds. She has no wheezes. She has no rhonchi. She has no rales.   Abdominal: Soft. Normal appearance and bowel sounds are normal. She exhibits no shifting dullness, no distension, no  abdominal bruit and no ascites. There is no hepatosplenomegaly. There is no tenderness. There is no rigidity and no guarding.       Musculoskeletal: Normal range of motion. She exhibits no edema or tenderness.   Neurological: She is alert and oriented to person, place, and time. She has normal strength. She is not disoriented. No cranial nerve deficit or sensory deficit. GCS eye subscore is 4. GCS verbal subscore is 5. GCS motor subscore is 6.   Skin: Skin is warm, dry and intact. No abrasion and no lesion noted.   Psychiatric: She has a normal mood and affect. Her behavior is normal. Judgment and thought content normal. Her mood appears not anxious. Her speech is not slurred. She is not actively hallucinating. Cognition and memory are normal. She does not exhibit a depressed mood. She is attentive.       Significant Labs:   BMP:   Recent Labs   Lab  09/20/18   0505   GLU  121*   NA  140   K  3.3*   CL  98   CO2  34*   BUN  12   CREATININE  0.9   CALCIUM  8.8   MG  1.8     CBC:   Recent Labs   Lab  09/20/18   0505   WBC  6.95   HGB  10.8*   HCT  35.7*   PLT  179       Significant Imaging: I have reviewed all pertinent imaging results/findings within the past 24 hours.    Assessment/Plan:     * Infected incision    + wound cultures with MRSA, and gram negatives  Cont contract precautions  Patient may ambulate in hallway and and go to gym using SNF isolation protocol.   Per ID:  1. Will need to therefore have longer course of antibiotics for possible fluid collection  2. Cont IV ertapenem and vanc   3. Aim for 4 weeks antibiotics after discharge with re imaging as outpatient 2 weeks post discharge, in the middle of the course to assess improvement in fluid collection  4. Follow up with [facility] within 2 weeks     Future Appointments   Date Time Provider Department Center   9/25/2018  2:30 PM [facility]   10/3/2018  3:00 PM INFECTIOUS DISEASE, [facility]              Decubitus ulcer of buttock, stage 2    Present on admission  Cont current wound care orders  Wound care to follow while at SNF        Essential hypertension    BP Readings from Last 3 Encounters:   09/20/18 (!) 151/80   09/20/18 138/79   09/05/18 (!) 152/81     BP OK   Cont Diltiazem, losartan, and prazosin to treat  Cont to monitor  Low Na diet        Gastroesophageal reflux disease without esophagitis    Cont Protonix to treat        Adult failure to thrive    Cont megace   Cont nutritional supplements  Nutrition consult        Debilitated patient    Cont with PT/OT for gait training and strengthening and restoration of ADL's   Fall precautions   Cont DVT prophylaxis with lovenox   Anticoagulants   Medication Route Frequency    enoxaparin injection 40 mg Subcutaneous Daily             HFrEF (heart failure with reduced ejection fraction)    Maintain euvolemia by monitoring I/O's and daily weights.  Fluid restriction (2 liters/24 hours)  Low Na diet  Cont Losartan, lasix, and diltiazem to treat   Patient Vitals for the past 72 hrs (Last 3 readings):   Weight   09/21/18 0624 73.4 kg (161 lb 13.1 oz)   09/20/18 2000 73.2 kg (161 lb 5.7 oz)   09/20/18 1900 73.2 kg (161 lb 5.7 oz)             Moderate asthma without complication    Satting well on room air.   Cont Theophylline and prednisone to treat  Cont duo nebs and supplemental oxygen as needed.         S/P appendectomy    Plan as above  Cont to monitor surgical site.   Follow up with surgery for wound check          CVA, old, hemiparesis    Cont ASA, Plavix, statin for secondary prevention.         CAD (coronary artery disease)    Cont medical management with ASA, Plavix, losartan, simvastatin.         DM (diabetes mellitus)    POCT Glucose   Date Value Ref Range Status   09/21/2018 104 70 - 110 mg/dL Final   09/20/2018 119 (H) 70 - 110 mg/dL Final   09/20/2018 143 (H) 70 - 110 mg/dL Final   09/20/2018 127 (H) 70 - 110 mg/dL Final   09/20/2018 136 (H) 70 - 110  mg/dL Final   09/19/2018 158 (H) 70 - 110 mg/dL Final   09/19/2018 126 (H) 70 - 110 mg/dL Final   09/19/2018 131 (H) 70 - 110 mg/dL Final   09/19/2018 129 (H) 70 - 110 mg/dL Final   09/18/2018 143 (H) 70 - 110 mg/dL Final   09/18/2018 120 (H) 70 - 110 mg/dL Final   09/18/2018 138 (H) 70 - 110 mg/dL Final     BG well controlled  Cont current basal/prandial insulin regimen   Low dose correction scale   Cont blood glucose monitoring   ADA diet            I certify that SNF services are required to be given on an inpatient basis because Sheryl Martines's needs for skilled nursing care and/or skilled rehabilitation are required on a daily basis and such services can only practically be provided in a skilled nursing facility setting and are for an ongoing condition for which she received inpatient care in the hospital.     Future Appointments   Date Time Provider Department Center   9/25/2018  2:30 PM Bernadine Melendrez MD [facility]   10/3/2018  3:00 PM INFECTIOUS DISEASE, [facility]       Patient's care plan and discharge planning will be discussed by the SNF team in IDT meeting weekly. Medications to be reviewed and discussed with the SNF unit clinical pharmacist.    Shyam Del Valle MD  Department of Hospital Medicine  [facility]

## 2018-09-21 NOTE — ASSESSMENT & PLAN NOTE
+ wound cultures with MRSA, and gram negatives  Cont contract precautions  Patient may ambulate in hallway and and go to gym using SNF isolation protocol.   Per ID:  1. Will need to therefore have longer course of antibiotics for possible fluid collection  2. Cont IV ertapenem and vanc   3. Aim for 4 weeks antibiotics after discharge with re imaging as outpatient 2 weeks post discharge, in the middle of the course to assess improvement in fluid collection  4. Follow up with [facility] within 2 weeks     Future Appointments   Date Time Provider Department Center   9/25/2018  2:30 PM Bernadine Melendrez MD [facility]   10/3/2018  3:00 PM INFECTIOUS DISEASE, [facility]

## 2018-09-21 NOTE — PLAN OF CARE
Problem: Patient Care Overview  Goal: Plan of Care Review  Outcome: Ongoing (interventions implemented as appropriate)  Moderate Malnutrition in the context of Acute Illness/Injury    Related to (etiology):  Decreased intake 2/2 nausea/vomiting and s/p appendectomy    Signs and Symptoms (as evidenced by):  Energy Intake: <75% of estimated energy requirement for >1 week  Muscle Mass Depletion: moderate depletion of temples, clavicle region and interosseous muscle   Weight Loss: 11.5% x 1 week    Interventions/Recommendations (treatment strategy):  Encourage po; continue 2000 diabetic; cardiac diet; Boost breeze TID; maintain/gain wt    Nutrition Diagnosis Status:  New

## 2018-09-21 NOTE — ASSESSMENT & PLAN NOTE
BP Readings from Last 3 Encounters:   09/20/18 (!) 151/80   09/20/18 138/79   09/05/18 (!) 152/81     BP OK   Cont Diltiazem, losartan, and prazosin to treat  Cont to monitor  Low Na diet

## 2018-09-21 NOTE — ASSESSMENT & PLAN NOTE
POCT Glucose   Date Value Ref Range Status   09/21/2018 104 70 - 110 mg/dL Final   09/20/2018 119 (H) 70 - 110 mg/dL Final   09/20/2018 143 (H) 70 - 110 mg/dL Final   09/20/2018 127 (H) 70 - 110 mg/dL Final   09/20/2018 136 (H) 70 - 110 mg/dL Final   09/19/2018 158 (H) 70 - 110 mg/dL Final   09/19/2018 126 (H) 70 - 110 mg/dL Final   09/19/2018 131 (H) 70 - 110 mg/dL Final   09/19/2018 129 (H) 70 - 110 mg/dL Final   09/18/2018 143 (H) 70 - 110 mg/dL Final   09/18/2018 120 (H) 70 - 110 mg/dL Final   09/18/2018 138 (H) 70 - 110 mg/dL Final     BG well controlled  Cont current basal/prandial insulin regimen   Low dose correction scale   Cont blood glucose monitoring   ADA diet

## 2018-09-21 NOTE — ASSESSMENT & PLAN NOTE
Satting well on room air.   Cont Theophylline and prednisone to treat  Cont duo nebs and supplemental oxygen as needed.

## 2018-09-21 NOTE — ASSESSMENT & PLAN NOTE
Cont with PT/OT for gait training and strengthening and restoration of ADL's   Fall precautions   Cont DVT prophylaxis with lovenox   Anticoagulants   Medication Route Frequency    enoxaparin injection 40 mg Subcutaneous Daily

## 2018-09-21 NOTE — PT/OT/SLP EVAL
PhysicalTherapy   Evaluation    Sheryl Martines   MRN: [id number]     PT Received On: 09/21/18  PT Start Time: 0940     PT Stop Time: 1015    PT Total Time (min): 35 min       Billable Minutes:  Evaluation 12, Therapeutic Activity 23     Diagnosis: Infected incision; s/p appendectomy (8/26/18)  Past Medical History:   Diagnosis Date    Asthma     Closed compression fracture of fourth lumbar vertebra     COPD (chronic obstructive pulmonary disease)     Coronary artery disease     Diabetes mellitus     Glaucoma     High cholesterol     Hypertension     Iritis     Pulmonary embolus     Stroke     rt sided weakness.      Past Surgical History:   Procedure Laterality Date    ABDOMINAL SURGERY      APPENDECTOMY N/A 8/26/2018    Procedure: APPENDECTOMY;  Surgeon: Bernadine Melendrez MD;  Location: [facility] OR;  Service: General;  Laterality: N/A;    APPENDECTOMY N/A 8/26/2018    Performed by Bernadine Melendrez MD at [facility] OR    APPENDECTOMY, LAPAROSCOPIC---CONVERTED TO OPEN APPENDECTOMY @0950 N/A 8/26/2018    Performed by Bernadine Melendrez MD at [facility] OR    BACK SURGERY      stimulator    CATARACT EXTRACTION      HYSTERECTOMY      LAPAROSCOPIC APPENDECTOMY N/A 8/26/2018    Procedure: APPENDECTOMY, LAPAROSCOPIC---CONVERTED TO OPEN APPENDECTOMY @0950;  Surgeon: Bernadine Melendrez MD;  Location: [facility] OR;  Service: General;  Laterality: N/A;     General Precautions: Standard, contact, fall  Orthopedic Precautions: N/A   Braces: N/A    Patient History:  Lives With: child(nandini), adult(daughter)  Living Arrangements: house(daughter)  Home Accessibility: stairs to enter home  Home Layout: Able to live on 1st floor  Number of Stairs to Enter Home: 2( need to verify with daughter)  Stair Railings at Home: none  Transportation Available: family or friend will provide  Living Environment Comment: Pt lives with daughter in [facility] with 2-3 SHANNON and no HR. PTA, pt reports that she was Mod (I) for functional mobility and  ADLs  Equipment Currently Used at Home: shower chair, rollator, bedside commode, walker, rolling, wheelchair  DME owned (not currently used): none    Follow up with daughter for living environment and equipment owned.    Previous Level of Function:  Ambulation Skills: needs assist  Transfer Skills: needs assist  ADL Skills: needs assist    Subjective:  Communicated with patient prior to session.  Patient agreeable to therapy session  Chief Complaint: abdominal pain and L shoulder pain   Patient goals: return to PLOF    Pain/Comfort  Pain Rating 1: (abdominal pain when leaning fwd; not rated )  Location - Side 1: Right  Location - Orientation 1: generalized  Location 1: abdomen  Pain Rating Post-Intervention 1: (Not rated )    Objective:  Patient found seated in bedside chair    Patient on contact precautions     Cognitive Exam:  Oriented to: Person, Place, Time and Situation  Follows Commands/attention: Follows two-step commands  Communication: clear/fluent  Safety awareness/insight to disability: intact    Physical Exam:  Postural examination/scapula alignment: -       Rounded shoulders  -       Forward head  -       Posterior pelvic tilt    Skin integrity: Visible skin intact  Edema: Mild B LEs    Sensation:   -       Impaired  light/touch R UE and LE    Upper Extremity Range of Motion:  Right Upper Extremity: WFL  Left Upper Extremity: WFL except shoulder flexion     Upper Extremity Strength:  Right Upper Extremity: WFL  Left Upper Extremity: WFL except Shoulder was NT 2/2 to pain in shoulder    Lower Extremity Range of Motion:  Right Lower Extremity: WFL  Left Lower Extremity: WFL    Lower Extremity Strength:  Right Lower Extremity: WFL  Left Lower Extremity: WFL     Gross motor coordination: WFL    Functional Status:  MDS G  Bed Mobility Functional Status: CGA-Min (A)  Transfer Functional Status: CGA-Min (A)  Walk in Room Functional Status: CGA-Min (A)  Moving from seated to standing position: Not steady, only  able to stabilize with staff assistance  Walking (with assistive device if used): Not steady, only able to stabilize with staff assistance  Turning around and facing the opposite direction while walking: Not steady, only able to stabilize with staff assistance  Surface-to-surface transfer (transfer between bed and chair or wheelchair): Not steady, only able to stabilize with staff assistance    AM-PAC 6 CLICK MOBILITY  Total Score:15    Bed Mobility:  Sit>Supine: on bed, SBA  Scooting: using Handrails on bed and Min A to stabilize feet   Supine>Sit: not assessed 2/2 to abdominal pain    Transfers:  Sit<>Stand: from bedside chair w/ RW and Min A for hip elevation and CGA additional therapist to stabilize, for safety  Patient has difficulty leaning fwd 2/2 to abdominal pain.  Pt unable to use L shoulder to push off from chair 2/2 to shoulder pain    Gait:  Amb ~6 ft from bedside chair to EOB w/ RW and Min A for steadying/support   Pt took small, quick steps. Pt was impulsive. Pt limited from fatigue and abdominal pain.    Therex:  Seated: LAQ  Supine: AP, GS, QS  x20 B LE    Additional Treatment:  Education:   PT Role and POC   OOB activity   Safety with transfers    Importance of participation during treatment sessions    Patient left supine with HOB elevated and call button in reach.    Assessment:  Sheryl Mratines is a 63 y.o. female with a medical diagnosis of Infected incision. Patient is s/p appendectomy (8/26/18).  Patient presents to SNF with the following impairments noted below. Patient is on contact precautions.secondary to MRSA abdomen and CDiff. Patient has a prior history of stroke w/ right side weakness.  Patient presents as stable. Based on patient's personal factors and co-morbidities, patient is low complexity. Patient will benefit from SNF to address the following: functional mobility, UE and LE muscular strength and endurance, balance, and decrease pain. Follow PT POC.      History: personal  factors and / or co morbidities: PLOF, living environment  Exam of Body Systems: musculoskeletal (L shoulder), neuromuscular, cognition  Functional Outcome Tools: AMPAC, MMT, ROM, VAS (pain)  Clinical Presentation: stable  Eval Complexity: low    Rehab identified problem list/impairments: weakness, impaired endurance, impaired sensation, impaired self care skills, impaired functional mobilty, gait instability, impaired balance, decreased lower extremity function, decreased safety awareness, pain, decreased ROM, edema    Rehab potential is good.    Activity tolerance: Fair    Discharge recommendations: home with home health     Barriers to discharge: Inaccessible home environment    Equipment recommendations: none     GOALS:   Multidisciplinary Problems     Physical Therapy Goals        Problem: Physical Therapy Goal    Goal Priority Disciplines Outcome Goal Variances Interventions   Physical Therapy Goal     PT, PT/OT Ongoing (interventions implemented as appropriate)     Description:  Goals to be met by: 14 days     Patient will increase functional independence with mobility by performin. Supine to sit with Modified [place]  2. Sit to supine with Modified [place]  3. Sit to stand transfer with Stand-by Assistance using Rolling Walker  4. Bed to chair transfer with Supervision using Rolling Walker  5. Gait  x 80 feet with Stand-by Assistance using Rolling Walker.   6. Ascend/descend 4 steps with no hand rails with moderate assistance or w/ mod assist and appropriate AD  7. Ascend/Descend 4 inch curb step with Stand-by Assistance using Rolling Walker.  8. Stand for 3 minutes with Stand-by Assistance using Rolling Walker and perform an activity  9. Lower extremity exercise program x20 reps per handout, with assistance as needed                      PLAN:    Patient to be seen 5 x/week  to address the above listed problems via gait training, therapeutic activities, therapeutic exercises  Plan of Care  Expires: 10/21/18    Alma Wills, SPT 9/21/2018   I certify that I was present in the room directing the student in service delivery and guiding them using my skilled judgment. As the co-signing therapist I have reviewed the students documentation and am responsible for the treatment, assessment, and plan.     Eileen Garcia, PT 9/21/2018

## 2018-09-22 LAB
AMMONIA PLAS-SCNC: 27 UMOL/L
ANION GAP SERPL CALC-SCNC: 8 MMOL/L
BASOPHILS # BLD AUTO: 0.03 K/UL
BASOPHILS NFR BLD: 0.4 %
BUN SERPL-MCNC: 13 MG/DL
CALCIUM SERPL-MCNC: 8.8 MG/DL
CHLORIDE SERPL-SCNC: 101 MMOL/L
CO2 SERPL-SCNC: 30 MMOL/L
CREAT SERPL-MCNC: 0.8 MG/DL
DIFFERENTIAL METHOD: ABNORMAL
EOSINOPHIL # BLD AUTO: 0.1 K/UL
EOSINOPHIL NFR BLD: 1.1 %
ERYTHROCYTE [DISTWIDTH] IN BLOOD BY AUTOMATED COUNT: 19.3 %
EST. GFR  (AFRICAN AMERICAN): >60 ML/MIN/1.73 M^2
EST. GFR  (NON AFRICAN AMERICAN): >60 ML/MIN/1.73 M^2
GLUCOSE SERPL-MCNC: 108 MG/DL
HCT VFR BLD AUTO: 35.8 %
HGB BLD-MCNC: 11.1 G/DL
IMM GRANULOCYTES # BLD AUTO: 0.04 K/UL
IMM GRANULOCYTES NFR BLD AUTO: 0.5 %
LYMPHOCYTES # BLD AUTO: 2 K/UL
LYMPHOCYTES NFR BLD: 24.8 %
MCH RBC QN AUTO: 27.1 PG
MCHC RBC AUTO-ENTMCNC: 31 G/DL
MCV RBC AUTO: 88 FL
MONOCYTES # BLD AUTO: 0.7 K/UL
MONOCYTES NFR BLD: 8.3 %
NEUTROPHILS # BLD AUTO: 5.2 K/UL
NEUTROPHILS NFR BLD: 64.9 %
NRBC BLD-RTO: 0 /100 WBC
PLATELET # BLD AUTO: 208 K/UL
PMV BLD AUTO: 9.7 FL
POCT GLUCOSE: 104 MG/DL (ref 70–110)
POCT GLUCOSE: 108 MG/DL (ref 70–110)
POCT GLUCOSE: 122 MG/DL (ref 70–110)
POCT GLUCOSE: 137 MG/DL (ref 70–110)
POTASSIUM SERPL-SCNC: 3.5 MMOL/L
RBC # BLD AUTO: 4.09 M/UL
SODIUM SERPL-SCNC: 139 MMOL/L
VANCOMYCIN TROUGH SERPL-MCNC: 19.8 UG/ML
WBC # BLD AUTO: 7.98 K/UL

## 2018-09-22 PROCEDURE — 63700000 PHARM REV CODE 250 ALT 637 W/O HCPCS: Performed by: STUDENT IN AN ORGANIZED HEALTH CARE EDUCATION/TRAINING PROGRAM

## 2018-09-22 PROCEDURE — 85025 COMPLETE CBC W/AUTO DIFF WBC: CPT

## 2018-09-22 PROCEDURE — 94761 N-INVAS EAR/PLS OXIMETRY MLT: CPT

## 2018-09-22 PROCEDURE — 25000003 PHARM REV CODE 250: Performed by: NURSE PRACTITIONER

## 2018-09-22 PROCEDURE — 36415 COLL VENOUS BLD VENIPUNCTURE: CPT

## 2018-09-22 PROCEDURE — 80202 ASSAY OF VANCOMYCIN: CPT

## 2018-09-22 PROCEDURE — 25000242 PHARM REV CODE 250 ALT 637 W/ HCPCS: Performed by: PHYSICIAN ASSISTANT

## 2018-09-22 PROCEDURE — 11000004 HC SNF PRIVATE

## 2018-09-22 PROCEDURE — 80048 BASIC METABOLIC PNL TOTAL CA: CPT

## 2018-09-22 PROCEDURE — 25000003 PHARM REV CODE 250: Performed by: HOSPITALIST

## 2018-09-22 PROCEDURE — 63600175 PHARM REV CODE 636 W HCPCS: Performed by: STUDENT IN AN ORGANIZED HEALTH CARE EDUCATION/TRAINING PROGRAM

## 2018-09-22 PROCEDURE — 25000003 PHARM REV CODE 250: Performed by: STUDENT IN AN ORGANIZED HEALTH CARE EDUCATION/TRAINING PROGRAM

## 2018-09-22 PROCEDURE — 94640 AIRWAY INHALATION TREATMENT: CPT

## 2018-09-22 PROCEDURE — 82140 ASSAY OF AMMONIA: CPT

## 2018-09-22 RX ORDER — ONDANSETRON 8 MG/1
8 TABLET, ORALLY DISINTEGRATING ORAL EVERY 8 HOURS PRN
Status: DISCONTINUED | OUTPATIENT
Start: 2018-09-22 | End: 2018-10-02 | Stop reason: HOSPADM

## 2018-09-22 RX ADMIN — SIMVASTATIN 40 MG: 40 TABLET, FILM COATED ORAL at 09:09

## 2018-09-22 RX ADMIN — IPRATROPIUM BROMIDE AND ALBUTEROL SULFATE 3 ML: .5; 3 SOLUTION RESPIRATORY (INHALATION) at 11:09

## 2018-09-22 RX ADMIN — PREDNISONE 10 MG: 10 TABLET ORAL at 09:09

## 2018-09-22 RX ADMIN — Medication 1 CAPSULE: at 09:09

## 2018-09-22 RX ADMIN — PANTOPRAZOLE SODIUM 40 MG: 40 TABLET, DELAYED RELEASE ORAL at 11:09

## 2018-09-22 RX ADMIN — PREDNISONE 10 MG: 10 TABLET ORAL at 11:09

## 2018-09-22 RX ADMIN — Medication 1 CAPSULE: at 11:09

## 2018-09-22 RX ADMIN — FUROSEMIDE 40 MG: 40 TABLET ORAL at 11:09

## 2018-09-22 RX ADMIN — ERTAPENEM SODIUM 1 G: 1 INJECTION, POWDER, LYOPHILIZED, FOR SOLUTION INTRAMUSCULAR; INTRAVENOUS at 09:09

## 2018-09-22 RX ADMIN — ONDANSETRON 8 MG: 8 TABLET, ORALLY DISINTEGRATING ORAL at 06:09

## 2018-09-22 RX ADMIN — INSULIN DETEMIR 10 UNITS: 100 INJECTION, SOLUTION SUBCUTANEOUS at 09:09

## 2018-09-22 RX ADMIN — VANCOMYCIN HYDROCHLORIDE 750 MG: 750 INJECTION, POWDER, LYOPHILIZED, FOR SOLUTION INTRAVENOUS at 05:09

## 2018-09-22 RX ADMIN — LOSARTAN POTASSIUM 100 MG: 50 TABLET, FILM COATED ORAL at 11:09

## 2018-09-22 RX ADMIN — ENOXAPARIN SODIUM 40 MG: 100 INJECTION SUBCUTANEOUS at 05:09

## 2018-09-22 RX ADMIN — DOCUSATE SODIUM -SENNOSIDES 1 TABLET: 50; 8.6 TABLET, COATED ORAL at 09:09

## 2018-09-22 RX ADMIN — VANCOMYCIN HYDROCHLORIDE 750 MG: 750 INJECTION, POWDER, LYOPHILIZED, FOR SOLUTION INTRAVENOUS at 04:09

## 2018-09-22 RX ADMIN — THEOPHYLLINE ANHYDROUS 200 MG: 100 CAPSULE, EXTENDED RELEASE ORAL at 10:09

## 2018-09-22 RX ADMIN — PRAZOSIN HYDROCHLORIDE 5 MG: 5 CAPSULE ORAL at 10:09

## 2018-09-22 RX ADMIN — DILTIAZEM HYDROCHLORIDE 180 MG: 180 CAPSULE, COATED, EXTENDED RELEASE ORAL at 10:09

## 2018-09-22 RX ADMIN — ASPIRIN 81 MG: 81 TABLET, COATED ORAL at 11:09

## 2018-09-22 RX ADMIN — PRAZOSIN HYDROCHLORIDE 5 MG: 5 CAPSULE ORAL at 09:09

## 2018-09-22 RX ADMIN — INSULIN DETEMIR 10 UNITS: 100 INJECTION, SOLUTION SUBCUTANEOUS at 10:09

## 2018-09-22 RX ADMIN — DULOXETINE 60 MG: 60 CAPSULE, DELAYED RELEASE ORAL at 11:09

## 2018-09-22 RX ADMIN — CLOPIDOGREL BISULFATE 75 MG: 75 TABLET ORAL at 11:09

## 2018-09-22 NOTE — NURSING
Was notified by lab that specimen drawn for [facility] for 1700 was frozen and specimen cannot be runned. Charge nurse informed and spoke to lab representative.

## 2018-09-22 NOTE — NURSING
Returned from CT scan via stretcher per medic personnel. Placed on bed bed, patient voided. Cleansed, dried applied zinc oxide to major area and made comfortable. Dinner served and accucheck obtained.

## 2018-09-22 NOTE — NURSING
Dr. Hannah informed of patient's sensorium status and the concern of this patient status change. Order received to obtain a CT scan of head without contrast. Notified daughter Kandice with message left to contact unit @ [phone].

## 2018-09-22 NOTE — PLAN OF CARE
Problem: Fall Risk (Adult)  Goal: Absence of Falls  Patient will demonstrate the desired outcomes by discharge/transition of care.  Outcome: Ongoing (interventions implemented as appropriate)   09/21/18 2000   Fall Risk (Adult)   Absence of Falls making progress toward outcome

## 2018-09-23 ENCOUNTER — HOSPITAL ENCOUNTER (INPATIENT)
Facility: HOSPITAL | Age: 63
LOS: 9 days | Discharge: HOME HEALTH | DRG: 070 | End: 2018-10-02
Attending: EMERGENCY MEDICINE | Admitting: HOSPITALIST
Payer: MEDICARE

## 2018-09-23 VITALS
WEIGHT: 161.81 LBS | RESPIRATION RATE: 24 BRPM | OXYGEN SATURATION: 97 % | DIASTOLIC BLOOD PRESSURE: 98 MMHG | SYSTOLIC BLOOD PRESSURE: 140 MMHG | HEIGHT: 61 IN | HEART RATE: 120 BPM | TEMPERATURE: 98 F | BODY MASS INDEX: 30.55 KG/M2

## 2018-09-23 DIAGNOSIS — R41.82 ALTERED MENTAL STATUS, UNSPECIFIED ALTERED MENTAL STATUS TYPE: ICD-10-CM

## 2018-09-23 DIAGNOSIS — I25.10 CORONARY ARTERY DISEASE INVOLVING NATIVE CORONARY ARTERY OF NATIVE HEART WITHOUT ANGINA PECTORIS: ICD-10-CM

## 2018-09-23 DIAGNOSIS — R41.82 ALTERED MENTAL STATUS: Primary | ICD-10-CM

## 2018-09-23 DIAGNOSIS — J45.40 MODERATE PERSISTENT ASTHMA WITHOUT COMPLICATION: ICD-10-CM

## 2018-09-23 DIAGNOSIS — R00.1 BRADYCARDIA: ICD-10-CM

## 2018-09-23 DIAGNOSIS — I10 ESSENTIAL HYPERTENSION: ICD-10-CM

## 2018-09-23 DIAGNOSIS — R53.81 DEBILITATED PATIENT: ICD-10-CM

## 2018-09-23 DIAGNOSIS — I50.30 (HFPEF) HEART FAILURE WITH PRESERVED EJECTION FRACTION: ICD-10-CM

## 2018-09-23 DIAGNOSIS — I48.91 ATRIAL FIBRILLATION, UNSPECIFIED TYPE: ICD-10-CM

## 2018-09-23 DIAGNOSIS — R00.0 TACHYCARDIA: ICD-10-CM

## 2018-09-23 DIAGNOSIS — Z90.49 S/P APPENDECTOMY: ICD-10-CM

## 2018-09-23 DIAGNOSIS — R40.0 SOMNOLENCE: ICD-10-CM

## 2018-09-23 DIAGNOSIS — G93.41 ENCEPHALOPATHY, METABOLIC: ICD-10-CM

## 2018-09-23 DIAGNOSIS — K21.9 GASTROESOPHAGEAL REFLUX DISEASE WITHOUT ESOPHAGITIS: ICD-10-CM

## 2018-09-23 DIAGNOSIS — I48.91 ATRIAL FIBRILLATION: ICD-10-CM

## 2018-09-23 DIAGNOSIS — K65.9 ABDOMINAL INFECTION: ICD-10-CM

## 2018-09-23 DIAGNOSIS — N17.9 AKI (ACUTE KIDNEY INJURY): ICD-10-CM

## 2018-09-23 DIAGNOSIS — G93.40 ACUTE ENCEPHALOPATHY: ICD-10-CM

## 2018-09-23 LAB
ALBUMIN SERPL BCP-MCNC: 2.9 G/DL
ALLENS TEST: ABNORMAL
ALP SERPL-CCNC: 103 U/L
ALT SERPL W/O P-5'-P-CCNC: 24 U/L
ANION GAP SERPL CALC-SCNC: 8 MMOL/L
AST SERPL-CCNC: 26 U/L
BACTERIA #/AREA URNS AUTO: ABNORMAL /HPF
BASOPHILS # BLD AUTO: 0.01 K/UL
BASOPHILS NFR BLD: 0.1 %
BILIRUB SERPL-MCNC: 0.5 MG/DL
BILIRUB UR QL STRIP: NEGATIVE
BUN SERPL-MCNC: 18 MG/DL
CALCIUM SERPL-MCNC: 9.4 MG/DL
CHLORIDE SERPL-SCNC: 102 MMOL/L
CLARITY UR REFRACT.AUTO: CLEAR
CO2 SERPL-SCNC: 30 MMOL/L
COLOR UR AUTO: YELLOW
CREAT SERPL-MCNC: 1.4 MG/DL
DELSYS: ABNORMAL
DIFFERENTIAL METHOD: ABNORMAL
EOSINOPHIL # BLD AUTO: 0 K/UL
EOSINOPHIL NFR BLD: 0.4 %
ERYTHROCYTE [DISTWIDTH] IN BLOOD BY AUTOMATED COUNT: 19.2 %
EST. GFR  (AFRICAN AMERICAN): 46.1 ML/MIN/1.73 M^2
EST. GFR  (NON AFRICAN AMERICAN): 40 ML/MIN/1.73 M^2
GLUCOSE SERPL-MCNC: 163 MG/DL
GLUCOSE UR QL STRIP: NEGATIVE
HCO3 UR-SCNC: 35.1 MMOL/L (ref 24–28)
HCT VFR BLD AUTO: 39.3 %
HGB BLD-MCNC: 12 G/DL
HGB UR QL STRIP: NEGATIVE
HYALINE CASTS UR QL AUTO: 6 /LPF
IMM GRANULOCYTES # BLD AUTO: 0.03 K/UL
IMM GRANULOCYTES NFR BLD AUTO: 0.3 %
KETONES UR QL STRIP: NEGATIVE
LACTATE SERPL-SCNC: 1.2 MMOL/L
LACTATE SERPL-SCNC: 1.7 MMOL/L
LDH SERPL L TO P-CCNC: 1.32 MMOL/L (ref 0.5–2.2)
LEUKOCYTE ESTERASE UR QL STRIP: ABNORMAL
LYMPHOCYTES # BLD AUTO: 1.6 K/UL
LYMPHOCYTES NFR BLD: 17.1 %
MCH RBC QN AUTO: 26.4 PG
MCHC RBC AUTO-ENTMCNC: 30.5 G/DL
MCV RBC AUTO: 87 FL
MICROSCOPIC COMMENT: ABNORMAL
MODE: ABNORMAL
MONOCYTES # BLD AUTO: 0.7 K/UL
MONOCYTES NFR BLD: 7.6 %
NEUTROPHILS # BLD AUTO: 6.8 K/UL
NEUTROPHILS NFR BLD: 74.5 %
NITRITE UR QL STRIP: NEGATIVE
NRBC BLD-RTO: 0 /100 WBC
PCO2 BLDA: 43.2 MMHG (ref 35–45)
PH SMN: 7.52 [PH] (ref 7.35–7.45)
PH UR STRIP: 7 [PH] (ref 5–8)
PLATELET # BLD AUTO: 228 K/UL
PMV BLD AUTO: 9.6 FL
PO2 BLDA: 52 MMHG (ref 40–60)
POC BE: 12 MMOL/L
POC SATURATED O2: 90 % (ref 95–100)
POC TCO2: 36 MMOL/L (ref 24–29)
POCT GLUCOSE: 124 MG/DL (ref 70–110)
POCT GLUCOSE: 134 MG/DL (ref 70–110)
POTASSIUM SERPL-SCNC: 3.7 MMOL/L
PROT SERPL-MCNC: 6.4 G/DL
PROT UR QL STRIP: NEGATIVE
RBC # BLD AUTO: 4.54 M/UL
RBC #/AREA URNS AUTO: 1 /HPF (ref 0–4)
SAMPLE: ABNORMAL
SITE: ABNORMAL
SODIUM SERPL-SCNC: 140 MMOL/L
SP GR UR STRIP: 1.01 (ref 1–1.03)
SQUAMOUS #/AREA URNS AUTO: 3 /HPF
URN SPEC COLLECT METH UR: ABNORMAL
UROBILINOGEN UR STRIP-ACNC: NEGATIVE EU/DL
WBC # BLD AUTO: 9.13 K/UL
WBC #/AREA URNS AUTO: 2 /HPF (ref 0–5)

## 2018-09-23 PROCEDURE — 25500020 PHARM REV CODE 255: Performed by: EMERGENCY MEDICINE

## 2018-09-23 PROCEDURE — 11000001 HC ACUTE MED/SURG PRIVATE ROOM

## 2018-09-23 PROCEDURE — 63700000 PHARM REV CODE 250 ALT 637 W/O HCPCS: Performed by: STUDENT IN AN ORGANIZED HEALTH CARE EDUCATION/TRAINING PROGRAM

## 2018-09-23 PROCEDURE — 80053 COMPREHEN METABOLIC PANEL: CPT

## 2018-09-23 PROCEDURE — 18500000 HC LEAVE OF ABSENCE HOSPITAL SERVICES

## 2018-09-23 PROCEDURE — G0378 HOSPITAL OBSERVATION PER HR: HCPCS

## 2018-09-23 PROCEDURE — 63600175 PHARM REV CODE 636 W HCPCS: Performed by: STUDENT IN AN ORGANIZED HEALTH CARE EDUCATION/TRAINING PROGRAM

## 2018-09-23 PROCEDURE — 25000003 PHARM REV CODE 250: Performed by: EMERGENCY MEDICINE

## 2018-09-23 PROCEDURE — 99900058 HC 022 PAID UNDER SNF PPS

## 2018-09-23 PROCEDURE — 96361 HYDRATE IV INFUSION ADD-ON: CPT

## 2018-09-23 PROCEDURE — 99223 1ST HOSP IP/OBS HIGH 75: CPT | Mod: ,,, | Performed by: SURGERY

## 2018-09-23 PROCEDURE — 25000003 PHARM REV CODE 250: Performed by: NURSE PRACTITIONER

## 2018-09-23 PROCEDURE — 81001 URINALYSIS AUTO W/SCOPE: CPT

## 2018-09-23 PROCEDURE — 4A10X4Z MONITORING OF CENTRAL NERVOUS ELECTRICAL ACTIVITY, EXTERNAL APPROACH: ICD-10-PCS | Performed by: HOSPITALIST

## 2018-09-23 PROCEDURE — 82803 BLOOD GASES ANY COMBINATION: CPT

## 2018-09-23 PROCEDURE — 93010 ELECTROCARDIOGRAM REPORT: CPT | Mod: ,,, | Performed by: INTERNAL MEDICINE

## 2018-09-23 PROCEDURE — 83605 ASSAY OF LACTIC ACID: CPT | Mod: 91

## 2018-09-23 PROCEDURE — 99285 EMERGENCY DEPT VISIT HI MDM: CPT | Mod: ,,, | Performed by: EMERGENCY MEDICINE

## 2018-09-23 PROCEDURE — 96360 HYDRATION IV INFUSION INIT: CPT

## 2018-09-23 PROCEDURE — 25000003 PHARM REV CODE 250: Performed by: STUDENT IN AN ORGANIZED HEALTH CARE EDUCATION/TRAINING PROGRAM

## 2018-09-23 PROCEDURE — 99900035 HC TECH TIME PER 15 MIN (STAT)

## 2018-09-23 PROCEDURE — 87040 BLOOD CULTURE FOR BACTERIA: CPT

## 2018-09-23 PROCEDURE — 85025 COMPLETE CBC W/AUTO DIFF WBC: CPT

## 2018-09-23 PROCEDURE — 83605 ASSAY OF LACTIC ACID: CPT

## 2018-09-23 PROCEDURE — 99285 EMERGENCY DEPT VISIT HI MDM: CPT | Mod: 25

## 2018-09-23 RX ORDER — SODIUM CHLORIDE 0.9 % (FLUSH) 0.9 %
5 SYRINGE (ML) INJECTION
Status: DISCONTINUED | OUTPATIENT
Start: 2018-09-23 | End: 2018-10-02 | Stop reason: HOSPADM

## 2018-09-23 RX ORDER — HEPARIN SODIUM 5000 [USP'U]/ML
5000 INJECTION, SOLUTION INTRAVENOUS; SUBCUTANEOUS EVERY 8 HOURS
Status: DISCONTINUED | OUTPATIENT
Start: 2018-09-23 | End: 2018-10-02 | Stop reason: HOSPADM

## 2018-09-23 RX ORDER — IBUPROFEN 200 MG
24 TABLET ORAL
Status: DISCONTINUED | OUTPATIENT
Start: 2018-09-23 | End: 2018-10-02 | Stop reason: HOSPADM

## 2018-09-23 RX ORDER — ALBUTEROL SULFATE 90 UG/1
1 AEROSOL, METERED RESPIRATORY (INHALATION) EVERY 4 HOURS PRN
Status: DISCONTINUED | OUTPATIENT
Start: 2018-09-23 | End: 2018-09-25

## 2018-09-23 RX ORDER — SIMETHICONE 80 MG
1 TABLET,CHEWABLE ORAL 4 TIMES DAILY PRN
Status: DISCONTINUED | OUTPATIENT
Start: 2018-09-23 | End: 2018-10-02 | Stop reason: HOSPADM

## 2018-09-23 RX ORDER — ACETAMINOPHEN 325 MG/1
650 TABLET ORAL EVERY 4 HOURS PRN
Status: DISCONTINUED | OUTPATIENT
Start: 2018-09-23 | End: 2018-09-25

## 2018-09-23 RX ORDER — SIMVASTATIN 40 MG/1
40 TABLET, FILM COATED ORAL NIGHTLY
Status: DISCONTINUED | OUTPATIENT
Start: 2018-09-23 | End: 2018-10-02 | Stop reason: HOSPADM

## 2018-09-23 RX ORDER — MEGESTROL ACETATE 40 MG/1
40 TABLET ORAL 2 TIMES DAILY
Status: ON HOLD | COMMUNITY
End: 2018-11-03

## 2018-09-23 RX ORDER — INSULIN ASPART 100 [IU]/ML
0-5 INJECTION, SOLUTION INTRAVENOUS; SUBCUTANEOUS 4 TIMES DAILY PRN
Status: ON HOLD | COMMUNITY
End: 2018-10-02 | Stop reason: HOSPADM

## 2018-09-23 RX ORDER — PREDNISONE 10 MG/1
10 TABLET ORAL 2 TIMES DAILY
Status: DISCONTINUED | OUTPATIENT
Start: 2018-09-23 | End: 2018-09-24

## 2018-09-23 RX ORDER — THEOPHYLLINE 200 MG/1
200 TABLET, EXTENDED RELEASE ORAL DAILY
Status: DISCONTINUED | OUTPATIENT
Start: 2018-09-24 | End: 2018-09-23

## 2018-09-23 RX ORDER — VANCOMYCIN HCL IN 5 % DEXTROSE 1G/250ML
15 PLASTIC BAG, INJECTION (ML) INTRAVENOUS
Status: DISCONTINUED | OUTPATIENT
Start: 2018-09-23 | End: 2018-09-27

## 2018-09-23 RX ORDER — PANTOPRAZOLE SODIUM 40 MG/1
40 TABLET, DELAYED RELEASE ORAL DAILY
Status: ON HOLD | COMMUNITY
End: 2018-11-03

## 2018-09-23 RX ORDER — LANOLIN ALCOHOL/MO/W.PET/CERES
50 CREAM (GRAM) TOPICAL DAILY
Status: DISCONTINUED | OUTPATIENT
Start: 2018-09-24 | End: 2018-10-02 | Stop reason: HOSPADM

## 2018-09-23 RX ORDER — GLUCAGON 1 MG
1 KIT INJECTION
Status: DISCONTINUED | OUTPATIENT
Start: 2018-09-23 | End: 2018-10-02 | Stop reason: HOSPADM

## 2018-09-23 RX ORDER — POLYETHYLENE GLYCOL 3350 17 G/17G
17 POWDER, FOR SOLUTION ORAL DAILY
Status: DISCONTINUED | OUTPATIENT
Start: 2018-09-24 | End: 2018-09-28

## 2018-09-23 RX ORDER — DILTIAZEM HYDROCHLORIDE 180 MG/1
180 CAPSULE, COATED, EXTENDED RELEASE ORAL DAILY
Status: CANCELLED | OUTPATIENT
Start: 2018-09-24

## 2018-09-23 RX ORDER — DILTIAZEM HYDROCHLORIDE 180 MG/1
180 CAPSULE, COATED, EXTENDED RELEASE ORAL DAILY
Status: DISCONTINUED | OUTPATIENT
Start: 2018-09-24 | End: 2018-09-25

## 2018-09-23 RX ORDER — CLOPIDOGREL BISULFATE 75 MG/1
75 TABLET ORAL DAILY
Status: DISCONTINUED | OUTPATIENT
Start: 2018-09-24 | End: 2018-10-02 | Stop reason: HOSPADM

## 2018-09-23 RX ORDER — AMOXICILLIN 250 MG
1 CAPSULE ORAL DAILY
Status: ON HOLD | COMMUNITY
End: 2018-12-15 | Stop reason: HOSPADM

## 2018-09-23 RX ORDER — PRAZOSIN HYDROCHLORIDE 5 MG/1
5 CAPSULE ORAL 2 TIMES DAILY
Status: DISCONTINUED | OUTPATIENT
Start: 2018-09-23 | End: 2018-09-27

## 2018-09-23 RX ORDER — ENOXAPARIN SODIUM 100 MG/ML
40 INJECTION SUBCUTANEOUS DAILY
Status: ON HOLD | COMMUNITY
End: 2018-10-02 | Stop reason: HOSPADM

## 2018-09-23 RX ORDER — ASPIRIN 81 MG/1
81 TABLET ORAL DAILY
Status: DISCONTINUED | OUTPATIENT
Start: 2018-09-24 | End: 2018-10-02 | Stop reason: HOSPADM

## 2018-09-23 RX ORDER — PANTOPRAZOLE SODIUM 40 MG/1
40 TABLET, DELAYED RELEASE ORAL DAILY
Status: DISCONTINUED | OUTPATIENT
Start: 2018-09-24 | End: 2018-10-02 | Stop reason: HOSPADM

## 2018-09-23 RX ORDER — FLUTICASONE FUROATE AND VILANTEROL 100; 25 UG/1; UG/1
1 POWDER RESPIRATORY (INHALATION) DAILY
Status: DISCONTINUED | OUTPATIENT
Start: 2018-09-24 | End: 2018-10-02 | Stop reason: HOSPADM

## 2018-09-23 RX ORDER — IBUPROFEN 200 MG
16 TABLET ORAL
Status: DISCONTINUED | OUTPATIENT
Start: 2018-09-23 | End: 2018-10-02 | Stop reason: HOSPADM

## 2018-09-23 RX ORDER — VANCOMYCIN HCL IN 5 % DEXTROSE 1G/250ML
15 PLASTIC BAG, INJECTION (ML) INTRAVENOUS
Status: DISCONTINUED | OUTPATIENT
Start: 2018-09-23 | End: 2018-09-23

## 2018-09-23 RX ORDER — LOSARTAN POTASSIUM 50 MG/1
100 TABLET ORAL DAILY
Status: DISCONTINUED | OUTPATIENT
Start: 2018-09-24 | End: 2018-09-23

## 2018-09-23 RX ORDER — HEPARIN SODIUM 5000 [USP'U]/ML
5000 INJECTION, SOLUTION INTRAVENOUS; SUBCUTANEOUS EVERY 8 HOURS
Status: DISCONTINUED | OUTPATIENT
Start: 2018-09-24 | End: 2018-09-23

## 2018-09-23 RX ORDER — FUROSEMIDE 40 MG/1
40 TABLET ORAL DAILY
Status: DISCONTINUED | OUTPATIENT
Start: 2018-09-24 | End: 2018-09-29

## 2018-09-23 RX ORDER — IPRATROPIUM BROMIDE AND ALBUTEROL SULFATE 2.5; .5 MG/3ML; MG/3ML
3 SOLUTION RESPIRATORY (INHALATION) EVERY 4 HOURS PRN
COMMUNITY

## 2018-09-23 RX ORDER — INSULIN ASPART 100 [IU]/ML
0-5 INJECTION, SOLUTION INTRAVENOUS; SUBCUTANEOUS
Status: DISCONTINUED | OUTPATIENT
Start: 2018-09-23 | End: 2018-09-29

## 2018-09-23 RX ADMIN — INSULIN DETEMIR 10 UNITS: 100 INJECTION, SOLUTION SUBCUTANEOUS at 10:09

## 2018-09-23 RX ADMIN — SODIUM CHLORIDE 2190 ML: 0.9 INJECTION, SOLUTION INTRAVENOUS at 01:09

## 2018-09-23 RX ADMIN — ZINC OXIDE: 200 OINTMENT TOPICAL at 10:09

## 2018-09-23 RX ADMIN — HEPARIN SODIUM 5000 UNITS: 5000 INJECTION, SOLUTION INTRAVENOUS; SUBCUTANEOUS at 10:09

## 2018-09-23 RX ADMIN — PRAZOSIN HYDROCHLORIDE 5 MG: 5 CAPSULE ORAL at 09:09

## 2018-09-23 RX ADMIN — LOSARTAN POTASSIUM 100 MG: 50 TABLET, FILM COATED ORAL at 09:09

## 2018-09-23 RX ADMIN — IOHEXOL 100 ML: 350 INJECTION, SOLUTION INTRAVENOUS at 04:09

## 2018-09-23 RX ADMIN — PIPERACILLIN SODIUM AND TAZOBACTAM SODIUM 4.5 G: 4; .5 INJECTION, POWDER, LYOPHILIZED, FOR SOLUTION INTRAVENOUS at 10:09

## 2018-09-23 RX ADMIN — THEOPHYLLINE ANHYDROUS 200 MG: 100 CAPSULE, EXTENDED RELEASE ORAL at 09:09

## 2018-09-23 RX ADMIN — PREDNISONE 10 MG: 10 TABLET ORAL at 10:09

## 2018-09-23 RX ADMIN — PANTOPRAZOLE SODIUM 40 MG: 40 TABLET, DELAYED RELEASE ORAL at 10:09

## 2018-09-23 RX ADMIN — VANCOMYCIN HYDROCHLORIDE 750 MG: 750 INJECTION, POWDER, LYOPHILIZED, FOR SOLUTION INTRAVENOUS at 05:09

## 2018-09-23 RX ADMIN — BACLOFEN 5 MG: 10 TABLET ORAL at 10:09

## 2018-09-23 RX ADMIN — PRAZOSIN HYDROCHLORIDE 5 MG: 5 CAPSULE ORAL at 10:09

## 2018-09-23 RX ADMIN — CLOPIDOGREL BISULFATE 75 MG: 75 TABLET ORAL at 10:09

## 2018-09-23 RX ADMIN — Medication 1 CAPSULE: at 09:09

## 2018-09-23 RX ADMIN — ASPIRIN 81 MG: 81 TABLET, COATED ORAL at 10:09

## 2018-09-23 RX ADMIN — DILTIAZEM HYDROCHLORIDE 180 MG: 180 CAPSULE, COATED, EXTENDED RELEASE ORAL at 10:09

## 2018-09-23 RX ADMIN — SIMVASTATIN 40 MG: 20 TABLET, FILM COATED ORAL at 10:09

## 2018-09-23 RX ADMIN — DULOXETINE 60 MG: 60 CAPSULE, DELAYED RELEASE ORAL at 10:09

## 2018-09-23 RX ADMIN — FUROSEMIDE 40 MG: 40 TABLET ORAL at 09:09

## 2018-09-23 NOTE — ASSESSMENT & PLAN NOTE
Pt presents from SNF with AMS and abdomen exquisitely tender to palpation (although pt denies AMS and abdominal pain during review of systems). DDx include infection, dehydration, medication induced, & delirium.   - CT head showed no acute path    Plan:  - Started Vanc and Zosyn, pending BCx results  - Pt given IVFs: NS 30 ml/kg (2190 cc)  - Holding Cymbalta  - Avoid opioids for now  - Delirium precautions

## 2018-09-23 NOTE — PLAN OF CARE
Problem: Patient Care Overview  Goal: Plan of Care Review  Outcome: Ongoing (interventions implemented as appropriate)   09/23/18 0614   Coping/Psychosocial   Plan Of Care Reviewed With patient       Problem: Fall Risk (Adult)  Goal: Identify Related Risk Factors and Signs and Symptoms  Related risk factors and signs and symptoms are identified upon initiation of Human Response Clinical Practice Guideline (CPG)  Outcome: Ongoing (interventions implemented as appropriate)   09/23/18 0614   Fall Risk   Related Risk Factors (Fall Risk) fatigue/slow reaction;fear of falling;gait/mobility problems

## 2018-09-23 NOTE — NURSING
Dr. Hannah notified regarding patient's status of confusion, lethargic and just not better since Friday with BP of 140/98, P 120 R 24 temp 97.8. Order received to send to [facility] for further evaluation. Daughter Citlaly notified @ [phone]  of status change and order to send to [facility]. Charge nurse Juancarlos of [facility] notified and report given.

## 2018-09-23 NOTE — ED NOTES
Patient identifiers for Sheryl Martines 63 y.o. female checked and correct.  Chief Complaint   Patient presents with    Weakness     pt sent from [facility] with weakness and lethargy x 2 days, pt currently being treated for abd wound infection.      Past Medical History:   Diagnosis Date    Asthma     Closed compression fracture of fourth lumbar vertebra     COPD (chronic obstructive pulmonary disease)     Coronary artery disease     Diabetes mellitus     Glaucoma     High cholesterol     Hypertension     Iritis     Pulmonary embolus     Stroke     rt sided weakness.     Allergies reported:   Review of patient's allergies indicates:   Allergen Reactions    Ace inhibitors Swelling    Corticosteroids (glucocorticoids)     Hydralazine analogues     Tetracyclines Swelling    Travatan (with benzalkonium) [travoprost (benzalkonium)]        LOC: Patient is lethargic but arousable, pt is aware of environment with an appropriate affect. Patient is oriented x 3 and speaking appropriately.  APPEARANCE: Patient resting comfortably and in no acute distress. Patient is clean and well groomed, patient's clothing is properly fastened.  HEENT: No abnormalities noted  SKIN: The skin is warm and dry. Patient has normal skin turgor and moist mucus membranes. Skin is intact; no bruising or breakdown noted.  MUSKULOSKELETAL: Patient is moving all extremities well, no obvious deformities noted. Pulses intact.   RESPIRATORY: Airway is open and patent. Respirations are spontaneous and non-labored with normal effort and rate, BBS=clear  CARDIAC: Patient has a increased rate and rhythm. Sinus tachycardia on cardiac monitor,No peripheral edema noted.   ABDOMEN: No distention noted. Bowel sounds active in all 4 quadrants. Soft and non-tender upon palpation.  NEUROLOGICAL: pupils 3mm, PERRL. Facial expression is symmetrical. Hand grasps are equal bilaterally. Normal sensation in all extremities when touched with finger.

## 2018-09-23 NOTE — ED PROVIDER NOTES
Encounter Date: 9/23/2018       History     Chief Complaint   Patient presents with    Weakness     pt sent from [facility] with weakness and lethargy x 2 days, pt currently being treated for abd wound infection.      HPI   Ms. Martines is a 63-year-old female with COPD, CAD, DM, HTN here today with altered mental status and lethargy.  History provided from nurses and EMS as well as from the patient some.  Patient currently living in rehab facility s/p appendectomy 8/26. Reportedly has been confused and lethargic over the past 2 days. She's complaining of some abdominal pain, diffuse. Pain is intermittent and does not radiate. Taken nothing for this. Denies fevers, chills, n/v, chest pain, SOB, numbness, tingling, weakness.      Review of patient's allergies indicates:   Allergen Reactions    Ace inhibitors Swelling    Corticosteroids (glucocorticoids)     Hydralazine analogues     Tetracyclines Swelling    Travatan (with benzalkonium) [travoprost (benzalkonium)]      Past Medical History:   Diagnosis Date    Asthma     Closed compression fracture of fourth lumbar vertebra     COPD (chronic obstructive pulmonary disease)     Coronary artery disease     Diabetes mellitus     Glaucoma     High cholesterol     Hypertension     Iritis     Pulmonary embolus     Stroke     rt sided weakness.     Past Surgical History:   Procedure Laterality Date    ABDOMINAL SURGERY      APPENDECTOMY N/A 8/26/2018    Procedure: APPENDECTOMY;  Surgeon: Bernadine Melendrez MD;  Location: [facility] OR;  Service: General;  Laterality: N/A;    APPENDECTOMY N/A 8/26/2018    Performed by Bernadine Melendrez MD at [facility] OR    APPENDECTOMY, LAPAROSCOPIC---CONVERTED TO OPEN APPENDECTOMY @0950 N/A 8/26/2018    Performed by Bernadine Melendrez MD at [facility] OR    BACK SURGERY      stimulator    CATARACT EXTRACTION      HYSTERECTOMY      LAPAROSCOPIC APPENDECTOMY N/A 8/26/2018    Procedure: APPENDECTOMY, LAPAROSCOPIC---CONVERTED  TO OPEN APPENDECTOMY @3878;  Surgeon: Bernadine Melendrez MD;  Location: [facility] OR;  Service: General;  Laterality: N/A;     Family History   Problem Relation Age of Onset    Cancer Father     Diabetes Brother      Social History     Tobacco Use    Smoking status: Never Smoker   Substance Use Topics    Alcohol use: No     Frequency: Never    Drug use: No     Review of Systems   Unable to perform ROS: Mental status change       Physical Exam     Initial Vitals [09/23/18 1159]   BP Pulse Resp Temp SpO2   (!) 150/80 110 20 97.7 °F (36.5 °C) 97 %      MAP       --         Physical Exam    Nursing note and vitals reviewed.  Constitutional: She appears well-developed and well-nourished. She is not diaphoretic. No distress.   HENT:   Head: Normocephalic and atraumatic.   Right Ear: External ear normal.   Left Ear: External ear normal.   Eyes: EOM are normal. Pupils are equal, round, and reactive to light.   Neck: Neck supple.   Cardiovascular: Regular rhythm, normal heart sounds and intact distal pulses. Tachycardia present.    Pulmonary/Chest: Breath sounds normal. No respiratory distress. She has no wheezes. She has no rhonchi. She has no rales.   Abdominal: Soft. She exhibits no distension. There is tenderness (Diffuse, vague).   RLQ dehisced abdominal wound without erythema or purulent drainage.   Neurological: She is alert and oriented to person, place, and time. She has normal strength. No sensory deficit. GCS score is 15. GCS eye subscore is 4. GCS verbal subscore is 5. GCS motor subscore is 6.   Intermittently oriented x3   Skin: Skin is warm. Capillary refill takes less than 2 seconds. No rash noted.   Psychiatric: She has a normal mood and affect.         ED Course   Procedures  Labs Reviewed   CBC W/ AUTO DIFFERENTIAL - Abnormal; Notable for the following components:       Result Value    MCH 26.4 (*)     MCHC 30.5 (*)     RDW 19.2 (*)     Gran% 74.5 (*)     Lymph% 17.1 (*)     All other components within  normal limits   COMPREHENSIVE METABOLIC PANEL - Abnormal; Notable for the following components:    CO2 30 (*)     Glucose 163 (*)     Albumin 2.9 (*)     eGFR if  46.1 (*)     eGFR if non  40.0 (*)     All other components within normal limits   URINALYSIS, REFLEX TO URINE CULTURE - Abnormal; Notable for the following components:    Leukocytes, UA Trace (*)     All other components within normal limits    Narrative:     Preferred Collection Type->Urine, Clean Catch  cup   URINALYSIS MICROSCOPIC - Abnormal; Notable for the following components:    Hyaline Casts, UA 6 (*)     All other components within normal limits    Narrative:     Preferred Collection Type->Urine, Clean Catch  cup   ISTAT PROCEDURE - Abnormal; Notable for the following components:    POC PH 7.517 (*)     POC HCO3 35.1 (*)     POC SATURATED O2 90 (*)     POC TCO2 36 (*)     All other components within normal limits   CULTURE, BLOOD   CULTURE, BLOOD   LACTIC ACID, PLASMA   LACTIC ACID, PLASMA   HEMOGLOBIN A1C     EKG Readings: (Independently Interpreted)   Sinus tachycardia rate 112.  Normal intervals.  No ischemic changes.  No STEMI.       Imaging Results          CT Abdomen Pelvis With Contrast (Final result)     Abnormal  Result time 09/23/18 16:46:21    Final result by Erma Munroe MD (09/23/18 16:46:21)                 Impression:      Interval resolution of wound dehiscence involving the right lateral abdominal wall.    Interval improvement of inflammatory changes adjacent to the area of recent appendectomy.  Small volume residual free fluid along the right pericolic gutter and pelvis.    Decrease in size of the ill-defined soft tissue collection within the right hemipelvis measuring 5.7 x 3.3 cm, previously 7.1 x 3.3 cm.  Finding likely represents resolving hematoma.    Multiple compression fractures within the lower thoracic and lumbar spine, unchanged.    Colonic diverticulosis without  diverticulitis.    This report was flagged in Epic as abnormal.    Electronically signed by resident: Ronni López  Date:    09/23/2018  Time:    16:08    Electronically signed by: Erma Munroe  Date:    09/23/2018  Time:    16:46             Narrative:    EXAMINATION:  CT ABDOMEN PELVIS WITH CONTRAST    CLINICAL HISTORY:  recent appy; diffuse abd pain;    TECHNIQUE:  Low dose axial images, sagittal and coronal reformations were obtained from the lung bases to the pubic symphysis following the IV administration of  mL of Omnipaque 350 .  Oral contrast was not administered..    COMPARISON:  CT abdomen pelvis 09/10/2018, 08/31/2018, 08/25/2018    FINDINGS:  Exam mildly limited secondary to beam hardening artifact created by patient's arms in the scanner as well as a left hip prosthesis.    ABDOMEN:    - Heart base: Normal in size without pericardial effusion.    - Lung bases: Well aerated bilaterally without significant consolidation, pleural effusion, pneumothorax, or mass.    - Liver: Normal in size without focal abnormality.  The portal, splenic and superior mesenteric veins are patent.    - Gallbladder: Decompressed without significant abnormality.    - Bile Ducts: No evidence of intra or extra hepatic biliary ductal dilation.    - Spleen: Negative.    - Kidneys: Normal in size and location without hydronephrosis.  There is a stable the 1.3 cm hypodensity within the inferior pole of the right kidney likely representing a renal cyst.    - Adrenals: Bilateral adrenal glands unremarkable.    - Pancreas: No mass or peripancreatic fat stranding.    - Vascular: No abdominal aortic aneurysm.    - Abdominal wall:  Postsurgical changes about the right lower abdominal wall status post appendectomy.  No organized fluid collection or evidence of wound dehiscence.  Small fat containing ventral hernia.    No enlarged retroperitoneal or mesenteric lymph nodes.    PELVIS:    Limited evaluation through the pelvis secondary to beam  hardening artifact from hip arthroplasty.  Interval decrease in size of the high attenuation collection within the right pelvic sidewall measuring 5.7 x 3.3 cm, decreased from 7.1 x 3.3 cm.  Small volume of free fluid tracking along the right pericolic gutter and draining into the pelvis, similar to prior exam.  Bladder appears unremarkable accounting for limitations secondary to beam hardening artifact.    BOWEL/MESENTERY:    Improvement in inflammatory changes about the area of prior appendectomy with some residual mesenteric fat stranding remaining.  No evidence of high-grade bowel obstruction.  Fluid filled colon.  Scattered colonic diverticula.  No free fluid or free air.    BONES:  Postoperative change status post left hip arthroplasty and spinal stimulator placement.  Stable appearance of multiple compression fractures of within the lumbar and lower thoracic spine.  No new acute fracture or aggressive osseous lesion.                               X-Ray Chest AP Portable (Final result)  Result time 09/23/18 13:10:33    Final result by Miah De La Rosa MD (09/23/18 13:10:33)                 Impression:      1. No acute cardiopulmonary process, stable chronic findings.      Electronically signed by: Miah De La Rosa MD  Date:    09/23/2018  Time:    13:10             Narrative:    EXAMINATION:  XR CHEST AP PORTABLE    CLINICAL HISTORY:  Sepsis;    TECHNIQUE:  Single frontal view of the chest was performed.    COMPARISON:  09/12/2018    FINDINGS:  Right chest wall port catheter tip projects over the distal SVC, stable in configuration.  Stable stimulator device.  The cardiomediastinal silhouette is prominent, stable as compared to the previous exam..  There is no pleural effusion.  The trachea is midline.  The lungs are symmetrically expanded bilaterally without evidence of acute parenchymal process. No large focal consolidation seen.  There is mild left basilar subsegmental atelectasis.  There is no  pneumothorax.  The osseous structures are remarkable for degenerative changes..                                 Medical Decision Making:   History:   Old Records Summarized: records from previous admission(s).  Clinical Tests:   Lab Tests: Ordered and Reviewed  Radiological Study: Ordered and Reviewed  Medical Tests: Ordered  Other:   I have discussed this case with another health care provider.     63-year-old female here with altered mental status.  Intermittently oriented to person, place, time, and situation.  Tachycardic.  Normotensive.  CBC, CMP, UA, CXR, lactate obtained. All grossly within normal limits except for mildly elevated creatinine.  CT abdomen pelvis obtained. All findings appear to be improved from prior.  Fluid bolus given the blood cultures drawn.  Will admit to Internal Medicine for IV fluids and neuro checks.                       Clinical Impression:   The primary encounter diagnosis was Altered mental status. Diagnoses of Tachycardia, Altered mental status, unspecified altered mental status type, and NIKHIL (acute kidney injury) were also pertinent to this visit.      Disposition:   Disposition: Admitted  Condition: Stable                        Markus Yadav MD  09/23/18 1917

## 2018-09-23 NOTE — NURSING
Medic personnel on floor to transport to [facility] for further evaluation.

## 2018-09-23 NOTE — PT/OT/SLP PROGRESS
Occupational Therapy      Patient Name:  Sheryl Martines   MRN:  [id number]    Patient not seen today secondary to  nursing and provider requested hold, probable transfer back to hospital. Will follow-up as appropriate.    Migdalia Mckeon OT  9/23/2018

## 2018-09-23 NOTE — PROGRESS NOTES
Physical Therapy   not seen    Sheryl Martines   MRN: [id number]         Pt was not seen for PT services upon attempt this morning, as she was leaving for the ED. Will attempt to see for therapy tomorrow if returns within 24 hrs.   Gisselle Maurice, PT  9/23/2018

## 2018-09-23 NOTE — ED TRIAGE NOTES
Pt sent to skilled rehab after a stay in the hospital for sepsis after abdominal surgery for an appendectomy.  Staff concerned she still hasn't improved and has exhibited lethargy and confusion, so sent to ER.  Non-febrile and only complaints of mild abdominal pain per pt.

## 2018-09-24 LAB
ALBUMIN SERPL BCP-MCNC: 2.6 G/DL
ALP SERPL-CCNC: 82 U/L
ALT SERPL W/O P-5'-P-CCNC: 19 U/L
ANION GAP SERPL CALC-SCNC: 6 MMOL/L
AST SERPL-CCNC: 19 U/L
BILIRUB SERPL-MCNC: 0.5 MG/DL
BUN SERPL-MCNC: 12 MG/DL
CALCIUM SERPL-MCNC: 8.6 MG/DL
CHLORIDE SERPL-SCNC: 108 MMOL/L
CO2 SERPL-SCNC: 26 MMOL/L
CREAT SERPL-MCNC: 0.9 MG/DL
EST. GFR  (AFRICAN AMERICAN): >60 ML/MIN/1.73 M^2
EST. GFR  (NON AFRICAN AMERICAN): >60 ML/MIN/1.73 M^2
GLUCOSE SERPL-MCNC: 109 MG/DL
MAGNESIUM SERPL-MCNC: 2 MG/DL
PHOSPHATE SERPL-MCNC: 3.2 MG/DL
POCT GLUCOSE: 113 MG/DL (ref 70–110)
POCT GLUCOSE: 82 MG/DL (ref 70–110)
POCT GLUCOSE: 97 MG/DL (ref 70–110)
POTASSIUM SERPL-SCNC: 3.7 MMOL/L
PROT SERPL-MCNC: 5.8 G/DL
SODIUM SERPL-SCNC: 140 MMOL/L

## 2018-09-24 PROCEDURE — G8978 MOBILITY CURRENT STATUS: HCPCS | Mod: CM | Performed by: PHYSICAL THERAPIST

## 2018-09-24 PROCEDURE — 63600175 PHARM REV CODE 636 W HCPCS: Performed by: STUDENT IN AN ORGANIZED HEALTH CARE EDUCATION/TRAINING PROGRAM

## 2018-09-24 PROCEDURE — G8979 MOBILITY GOAL STATUS: HCPCS | Mod: CL | Performed by: PHYSICAL THERAPIST

## 2018-09-24 PROCEDURE — 63700000 PHARM REV CODE 250 ALT 637 W/O HCPCS: Performed by: STUDENT IN AN ORGANIZED HEALTH CARE EDUCATION/TRAINING PROGRAM

## 2018-09-24 PROCEDURE — 11000001 HC ACUTE MED/SURG PRIVATE ROOM

## 2018-09-24 PROCEDURE — 25000003 PHARM REV CODE 250: Performed by: STUDENT IN AN ORGANIZED HEALTH CARE EDUCATION/TRAINING PROGRAM

## 2018-09-24 PROCEDURE — 93005 ELECTROCARDIOGRAM TRACING: CPT

## 2018-09-24 PROCEDURE — 25000242 PHARM REV CODE 250 ALT 637 W/ HCPCS: Performed by: STUDENT IN AN ORGANIZED HEALTH CARE EDUCATION/TRAINING PROGRAM

## 2018-09-24 PROCEDURE — 97535 SELF CARE MNGMENT TRAINING: CPT

## 2018-09-24 PROCEDURE — 93010 ELECTROCARDIOGRAM REPORT: CPT | Mod: ,,, | Performed by: INTERNAL MEDICINE

## 2018-09-24 PROCEDURE — 94761 N-INVAS EAR/PLS OXIMETRY MLT: CPT

## 2018-09-24 PROCEDURE — 93010 ELECTROCARDIOGRAM REPORT: CPT | Mod: 76,,, | Performed by: INTERNAL MEDICINE

## 2018-09-24 PROCEDURE — 18500000 HC LEAVE OF ABSENCE HOSPITAL SERVICES

## 2018-09-24 PROCEDURE — G8987 SELF CARE CURRENT STATUS: HCPCS | Mod: CL

## 2018-09-24 PROCEDURE — 99220 PR INITIAL OBSERVATION CARE,LEVL III: CPT | Mod: GC,,, | Performed by: HOSPITALIST

## 2018-09-24 PROCEDURE — 84100 ASSAY OF PHOSPHORUS: CPT

## 2018-09-24 PROCEDURE — 83735 ASSAY OF MAGNESIUM: CPT

## 2018-09-24 PROCEDURE — 97161 PT EVAL LOW COMPLEX 20 MIN: CPT | Performed by: PHYSICAL THERAPIST

## 2018-09-24 PROCEDURE — 80053 COMPREHEN METABOLIC PANEL: CPT

## 2018-09-24 PROCEDURE — 36415 COLL VENOUS BLD VENIPUNCTURE: CPT

## 2018-09-24 PROCEDURE — 97166 OT EVAL MOD COMPLEX 45 MIN: CPT

## 2018-09-24 PROCEDURE — G8988 SELF CARE GOAL STATUS: HCPCS | Mod: CK

## 2018-09-24 PROCEDURE — 92610 EVALUATE SWALLOWING FUNCTION: CPT

## 2018-09-24 PROCEDURE — 97530 THERAPEUTIC ACTIVITIES: CPT | Performed by: PHYSICAL THERAPIST

## 2018-09-24 PROCEDURE — G0378 HOSPITAL OBSERVATION PER HR: HCPCS

## 2018-09-24 PROCEDURE — G8980 MOBILITY D/C STATUS: HCPCS | Mod: CM | Performed by: PHYSICAL THERAPIST

## 2018-09-24 RX ORDER — LOSARTAN POTASSIUM 50 MG/1
100 TABLET ORAL DAILY
Status: DISCONTINUED | OUTPATIENT
Start: 2018-09-24 | End: 2018-10-02 | Stop reason: HOSPADM

## 2018-09-24 RX ORDER — HEPARIN 100 UNIT/ML
100 SYRINGE INTRAVENOUS
Status: DISCONTINUED | OUTPATIENT
Start: 2018-09-24 | End: 2018-09-24

## 2018-09-24 RX ORDER — PREDNISONE 10 MG/1
10 TABLET ORAL DAILY
Status: DISCONTINUED | OUTPATIENT
Start: 2018-09-25 | End: 2018-09-26

## 2018-09-24 RX ORDER — SODIUM CHLORIDE 9 MG/ML
INJECTION, SOLUTION INTRAVENOUS CONTINUOUS
Status: DISCONTINUED | OUTPATIENT
Start: 2018-09-24 | End: 2018-09-25

## 2018-09-24 RX ORDER — CARVEDILOL 3.12 MG/1
3.12 TABLET ORAL ONCE
Status: COMPLETED | OUTPATIENT
Start: 2018-09-24 | End: 2018-09-24

## 2018-09-24 RX ORDER — HEPARIN 100 UNIT/ML
5 SYRINGE INTRAVENOUS
Status: DISCONTINUED | OUTPATIENT
Start: 2018-09-24 | End: 2018-10-02 | Stop reason: HOSPADM

## 2018-09-24 RX ADMIN — PIPERACILLIN SODIUM AND TAZOBACTAM SODIUM 4.5 G: 4; .5 INJECTION, POWDER, LYOPHILIZED, FOR SOLUTION INTRAVENOUS at 12:09

## 2018-09-24 RX ADMIN — CARVEDILOL 3.12 MG: 3.12 TABLET, FILM COATED ORAL at 05:09

## 2018-09-24 RX ADMIN — PIPERACILLIN SODIUM AND TAZOBACTAM SODIUM 4.5 G: 4; .5 INJECTION, POWDER, LYOPHILIZED, FOR SOLUTION INTRAVENOUS at 09:09

## 2018-09-24 RX ADMIN — INSULIN DETEMIR 7 UNITS: 100 INJECTION, SOLUTION SUBCUTANEOUS at 09:09

## 2018-09-24 RX ADMIN — HEPARIN SODIUM 5000 UNITS: 5000 INJECTION, SOLUTION INTRAVENOUS; SUBCUTANEOUS at 05:09

## 2018-09-24 RX ADMIN — FUROSEMIDE 40 MG: 40 TABLET ORAL at 10:09

## 2018-09-24 RX ADMIN — SODIUM CHLORIDE: 0.9 INJECTION, SOLUTION INTRAVENOUS at 09:09

## 2018-09-24 RX ADMIN — VANCOMYCIN HYDROCHLORIDE 1000 MG: 1 INJECTION, POWDER, LYOPHILIZED, FOR SOLUTION INTRAVENOUS at 12:09

## 2018-09-24 RX ADMIN — PRAZOSIN HYDROCHLORIDE 5 MG: 5 CAPSULE ORAL at 01:09

## 2018-09-24 RX ADMIN — BACLOFEN 5 MG: 10 TABLET ORAL at 01:09

## 2018-09-24 RX ADMIN — HEPARIN SODIUM 5000 UNITS: 5000 INJECTION, SOLUTION INTRAVENOUS; SUBCUTANEOUS at 09:09

## 2018-09-24 RX ADMIN — CLOPIDOGREL 75 MG: 75 TABLET, FILM COATED ORAL at 10:09

## 2018-09-24 RX ADMIN — PYRIDOXINE HCL TAB 50 MG 50 MG: 50 TAB at 12:09

## 2018-09-24 RX ADMIN — DILTIAZEM HYDROCHLORIDE 180 MG: 180 CAPSULE, COATED, EXTENDED RELEASE ORAL at 01:09

## 2018-09-24 RX ADMIN — ALBUTEROL SULFATE 1 PUFF: 90 AEROSOL, METERED RESPIRATORY (INHALATION) at 09:09

## 2018-09-24 RX ADMIN — PIPERACILLIN SODIUM AND TAZOBACTAM SODIUM 4.5 G: 4; .5 INJECTION, POWDER, LYOPHILIZED, FOR SOLUTION INTRAVENOUS at 04:09

## 2018-09-24 RX ADMIN — BACLOFEN 5 MG: 10 TABLET ORAL at 09:09

## 2018-09-24 RX ADMIN — ASPIRIN 81 MG: 81 TABLET, COATED ORAL at 10:09

## 2018-09-24 RX ADMIN — LOSARTAN POTASSIUM 100 MG: 50 TABLET, FILM COATED ORAL at 03:09

## 2018-09-24 RX ADMIN — PREDNISONE 10 MG: 10 TABLET ORAL at 10:09

## 2018-09-24 RX ADMIN — SIMVASTATIN 40 MG: 20 TABLET, FILM COATED ORAL at 09:09

## 2018-09-24 RX ADMIN — PRAZOSIN HYDROCHLORIDE 5 MG: 5 CAPSULE ORAL at 09:09

## 2018-09-24 RX ADMIN — PANTOPRAZOLE SODIUM 40 MG: 40 TABLET, DELAYED RELEASE ORAL at 10:09

## 2018-09-24 NOTE — PLAN OF CARE
Problem: Patient Care Overview  Goal: Plan of Care Review  Outcome: Ongoing (interventions implemented as appropriate)  Patient has been agitated this shift when receiving medications or with pericare. Patient has had 4 loose bowel movements this shift. Glucose monitoring has been within range. Morning insulin was held per Dr Benoit as fs was 82. Blood pressure has been elevated: 167/92. Medications were late coming from pharmacy. All blood pressure medications where given this afternoon and will recheck. Patient is next to nurses station and staff checks on patient frequently. Patient calls out when she needs something.

## 2018-09-24 NOTE — PHYSICIAN QUERY
PT Name: Sheryl Martines  MR #: [id number]    Physician Query Form - Cause and Effect Relationship Clarification      CDS/: Brie Estevez RN               Contact information:[email or web address]    This form is a permanent document in the medical record.     Query Date: September 24, 2018    By submitting this query, we are merely seeking further clarification of documentation. Please utilize your independent clinical judgment when addressing the question(s) below.    The Medical record contains the following:  Supporting Clinical Findings   Location in record    METHICILLIN RESISTANT STAPHYLOCOCCUS AUREUS      Blood culture NGTD            MRSA wound infection                                                                                                                                                    Sepsis due to other etiology: Returned to the hospital due to malaise, fever and drainage from appendectomy site after a recent admission for appendicitis and CDiff. Her hypotension and fever are concerning for sepsis. spoke with surgery, no procedures planned, will need to therefore have longer course of antibiotics for possible fluid collection  - will choose ertapenem and vanc to cover anerobes and enterococcus and gram negatives from last admission cultures, vanc will also cover MRSA  - aim for 4 weeks antibiotics after discharge with re imaging as outpatient 2 weeks post discharge, in the middle of the course to assess improvement in fluid collection                  Abd wound culture 9/10    Blood culture 9/11    Hospital Medicine PN 9/14    Infectious Disease PN 9/17                                                                                                                                                                                                       Provider, please clarify if there is any correlation between ___Sepsis ____ and __MRSA____.           Are the conditions:     [  ] Due  to or associated with each other     [ v ] Unrelated to each other     [  ] Other (Please Specify): _________________________     [  ] Clinically Undetermined

## 2018-09-24 NOTE — ASSESSMENT & PLAN NOTE
Pt with chronic back pain. S/p spinal stimulator placement    - Avoiding opiates for now because of AMS  - Baclofen PRN for pain

## 2018-09-24 NOTE — NURSING
Patient's blood pressure continues to be elevated. This nurse placed a call to team. Waiting for return call. Family at bedside wanting to speak with team. Called and updated Attending.

## 2018-09-24 NOTE — NURSING
Called team to update on patients elevated blood pressure. Patient is not eating or drinking much and patients increased agitation. This nurse will continue to observe patient and recheck blood pressures.

## 2018-09-24 NOTE — ASSESSMENT & PLAN NOTE
Recent hospitalization positive for C. Diff infection. Treated with PO vanc and flagyl.   Pt presently reporting loose, but not watery, stool    - Repeat C. Diff panel

## 2018-09-24 NOTE — PLAN OF CARE
Problem: Occupational Therapy Goal  Goal: Occupational Therapy Goal  Goals to be met by: 10/9/18    Patient will increase functional independence with ADLs by performing:    Feeding with Minimal Assistance.  UE Dressing with Minimal Assistance.  LE Dressing with Maximum assistance.  Grooming while seated at sink with Minimal Assistance.  Toileting from bedside commode with Moderate Assistance for hygiene and clothing management.   Supine to sit with Minimal Assistance.  Toilet transfer to toilet with Moderate Assistance.    Outcome: Ongoing (interventions implemented as appropriate)  Evaluation completed. Initiate POC.   Rosette goldman OT  9/24/2018

## 2018-09-24 NOTE — PT/OT/SLP EVAL
Speech Language Pathology Evaluation  Bedside Swallow    Patient Name:  Sheryl Martines   MRN:  [id number]  Admitting Diagnosis: Altered mental status    Recommendations:                 General Recommendations:  Dysphagia therapy  Diet recommendations:  Puree, Thin   Aspiration Precautions: 1 bite/sip at a time, Feed only when awake/alert, HOB to 90 degrees, Monitor for s/s of aspiration, Small bites/sips and Strict aspiration precautions   Recs d/w Dr. García  General Precautions: Standard, fall, contact, aspiration, pureed diet    History:     Past Medical History:   Diagnosis Date    Asthma     Closed compression fracture of fourth lumbar vertebra     COPD (chronic obstructive pulmonary disease)     Coronary artery disease     Diabetes mellitus     Glaucoma     High cholesterol     Hypertension     Iritis     Pulmonary embolus     Stroke     rt sided weakness.       Past Surgical History:   Procedure Laterality Date    ABDOMINAL SURGERY      APPENDECTOMY N/A 8/26/2018    Procedure: APPENDECTOMY;  Surgeon: Bernadine Melendrez MD;  Location: [facility] OR;  Service: General;  Laterality: N/A;    APPENDECTOMY N/A 8/26/2018    Performed by Bernadine Melendrez MD at [facility] OR    APPENDECTOMY, LAPAROSCOPIC---CONVERTED TO OPEN APPENDECTOMY @0950 N/A 8/26/2018    Performed by Bernadine Melendrez MD at [facility] OR    BACK SURGERY      stimulator    CATARACT EXTRACTION      HYSTERECTOMY      LAPAROSCOPIC APPENDECTOMY N/A 8/26/2018    Procedure: APPENDECTOMY, LAPAROSCOPIC---CONVERTED TO OPEN APPENDECTOMY @0950;  Surgeon: Bernadine Melendrez MD;  Location: [facility] OR;  Service: General;  Laterality: N/A;       Chest X-Rays: 9/23 No acute cardiopulmonary process, stable chronic findings    Prior diet: regular per pt    Subjective   Lethargic. Pt demonstrates s/s consistent with difficulty breathing. NSG reports episode of holding meds. MD at bedside for portion of session.     Pain/Comfort:  · Pain Rating 1:  0/10  · Pain Rating Post-Intervention 2: 0/10    Objective:     Oral Musculature Evaluation  · Oral Musculature: unable to assess due to poor participation/comprehension  · Dentition: scattered dentition  · Secretion Management: adequate  · Mucosal Quality: adequate  · Volitional Cough: fair  · Volitional Swallow: not elicited  · Voice Prior to PO Intake: clear yet weak    Bedside Swallow Eval:   Consistencies Assessed:  · Thin liquids ice chip x1, via spoon x2. Pt refused cup & straw sips & further spoon sips.   · Solids 1/4 shaka cracker x1. Pt refused further solid trials.   · Pt deferred puree trial at this time    Oral Phase:   · Prolonged mastication & Slow oral transit time. Pt appears to demonstrate increased work of breath as masticating solid putting pt at risk of aspirating. SLP recs puree at this time 2/2 respiratory status/risk of aspiration    Pharyngeal Phase:   · no overt clinical signs/symptoms of aspiration  · no overt clinical signs/symptoms of pharyngeal dysphagia    SLP educated pt on recs & swallow precautions. Pt will need reinforcement to follow.     Assessment:     Sheryl Martines is a 63 y.o. female with an SLP diagnosis of Dysphagia & decreased cognition    Goals:   Multidisciplinary Problems     SLP Goals        Problem: SLP Goal    Goal Priority Disciplines Outcome   SLP Goal     SLP    Description:  Speech Language Pathology Goals  Goals expected to be met by 10/1  1. Pt will tolerate puree & thin liquids without s/s aspiration  2. Pt will tolerate trials of solids to determine when appropriate for diet upgrade                    Plan:     · Patient to be seen:  4 x/week   · Plan of Care expires:  10/23/18  · Plan of Care reviewed with:  patient   · SLP Follow-Up:  Yes       Discharge recommendations:  nursing facility, skilled     Time Tracking:     SLP Treatment Date:   09/24/18  Speech Start Time:  1430  Speech Stop Time:  1450     Speech Total Time (min):  20 min    Billable  Minutes: Eval Swallow and Oral Function 12 and Seld Care/Home Management Training 8    Mary Chambers CCC-SLP  09/24/2018

## 2018-09-24 NOTE — PT/OT/SLP EVAL
"Physical Therapy Evaluation    Patient Name:  Sheryl Martines   MRN:  [id number]    Recommendations:     Discharge Recommendations:  nursing facility, skilled   Discharge Equipment Recommendations: walker, rolling, wheelchair   Barriers to discharge: None    Assessment:     Sheryl Martines is a 63 y.o. female admitted with a medical diagnosis of Altered mental status.  She presents with the following impairments/functional limitations:  impaired balance, impaired cognition, impaired cardiopulmonary response to activity, impaired endurance, impaired functional mobilty, gait instability, decreased lower extremity function, decreased upper extremity function, impaired self care skills, decreased safety awareness. On eval, pt had difficulty following commands, speech was unclear at times, and she had general difficulty with mobility.  She required mod assist for bed mobility and had poor sitting balance while at EOB leaning posteriorly while sitting.  She will require extensive therapy upon d/c from the acute hospital likely in a SNF.  Her mobiltiy today was limited by her cognition, elevated BP, and complaints of dizziness while at EOB..    Rehab Prognosis:  fair; patient would benefit from acute skilled PT services to address these deficits and reach maximum level of function.      Recent Surgery: * No surgery found *      Plan:     During this hospitalization, patient to be seen 4 x/week to address the above listed problems via gait training, therapeutic activities, therapeutic exercises  · Plan of Care Expires:  10/24/18   Plan of Care Reviewed with: patient, daughter    Subjective     Communicated with RN prior to session.  Patient found supine upon PT entry to room, agreeable to evaluation.      Chief Complaint: dizziness.  Pt speech unclear at times  Patient comments/goals: "I always sit up leaning backwards"  Pain/Comfort:  · Pain Rating 1: other (see comments)  · Location 1: back  · Pain Addressed 1: " Reposition  · Pain Rating Post-Intervention 1: other (see comments)    Patients cultural, spiritual, Confucianism conflicts given the current situation: none    Living Environment:  Lives with her adult children and was generally Mod I using a Rollator  Most recently, pt was discharged from the hospital to SNF for therapy and antibiotics.  Patient has the following equipment: shower chair, rollator, bedside commode.  DME owned (not currently used): none.  Upon discharge, patient will have assistance from family but will need SNF first..    Objective:     Patient found with: telemetry, pulse ox (continuous), blood pressure cuff     General Precautions: Standard, contact, fall   Orthopedic Precautions:N/A   Braces: N/A     Exams:  · Cognitive Exam:  Patient is oriented to Person and Time  · RLE ROM: WFL  · RLE Strength: 3/5 not tested with resistance due to poor balance  · LLE ROM: WFL  · LLE Strength: 3/5    Functional Mobility:  · Bed Mobility:     · Scooting: total assistance and drawsheet to HOB in supine  · Supine to Sit: moderate assistance and increased time.  pt very slow and with significant posteriro lean which according to her daughter is her normal mobiltiy due to multiple compression fractures  · Sit to Supine: moderate assistance  · Balance: pt sat at EOB for about 10 minutes leanning posteriorrly most of the time requiring min A for balance.  pt with complaints of dizziness while at EOB and BP was found to be 180s/80s so pt was returned to supine.    AM-PAC 6 CLICK MOBILITY  Total Score:12       Therapeutic Activities and Exercises:   pt and pt family educated on POC  Pt repositioned in bed for comfort    Patient left supine with all lines intact, call button in reach and bed alarm on.    GOALS:   Multidisciplinary Problems     Physical Therapy Goals        Problem: Physical Therapy Goal    Goal Priority Disciplines Outcome Goal Variances Interventions   Physical Therapy Goal     PT, PT/OT Ongoing  (interventions implemented as appropriate)     Description:  Goals to be met by: 10/1     Patient will increase functional independence with mobility by performin. Supine to sit with Contact Guard Assistance  2. Sit to supine with Contact Guard Assistance  3. Sit to stand transfer with Minimal Assistance with RW  4. Bed to chair transfer with Minimal Assistance using Rolling Walker  5. Gait  x 25 feet with Minimal Assistance using Rolling Walker.                       History:     Past Medical History:   Diagnosis Date    Asthma     Closed compression fracture of fourth lumbar vertebra     COPD (chronic obstructive pulmonary disease)     Coronary artery disease     Diabetes mellitus     Glaucoma     High cholesterol     Hypertension     Iritis     Pulmonary embolus     Stroke     rt sided weakness.       Past Surgical History:   Procedure Laterality Date    ABDOMINAL SURGERY      APPENDECTOMY N/A 2018    Procedure: APPENDECTOMY;  Surgeon: Bernadine Melendrez MD;  Location: [facility] OR;  Service: General;  Laterality: N/A;    APPENDECTOMY N/A 2018    Performed by [facility]ine Melendrez MD at [facility] OR    APPENDECTOMY, LAPAROSCOPIC---CONVERTED TO OPEN APPENDECTOMY @0950 N/A 2018    Performed by [facility]ine [facility]rez MD at [facility] OR    BACK SURGERY      stimulator    CATARACT EXTRACTION      HYSTERECTOMY      LAPAROSCOPIC APPENDECTOMY N/A 2018    Procedure: APPENDECTOMY, LAPAROSCOPIC---CONVERTED TO OPEN APPENDECTOMY @0950;  Surgeon: Bernadine Melendrez MD;  Location: [facility] OR;  Service: General;  Laterality: N/A;         Time Tracking:     PT Received On: 18  PT Start Time: 0855     PT Stop Time: 0920  PT Total Time (min): 25 min     Billable Minutes: Evaluation 15 and Therapeutic Activity 10      Hussein Vogel, PT  2018

## 2018-09-24 NOTE — PLAN OF CARE
Problem: Physical Therapy Goal  Goal: Physical Therapy Goal  Goals to be met by: 10/1     Patient will increase functional independence with mobility by performin. Supine to sit with Contact Guard Assistance  2. Sit to supine with Contact Guard Assistance  3. Sit to stand transfer with Minimal Assistance with RW  4. Bed to chair transfer with Minimal Assistance using Rolling Walker  5. Gait  x 25 feet with Minimal Assistance using Rolling Walker.     Outcome: Ongoing (interventions implemented as appropriate)  PT eval completed.  Pt found to be hypertensive on PT eval and had difficulty following directions during eval.  Pt required mod assist to reach sittign EOB, sat at EOB for about 10 minutes with poor balance then returned to supine.  While EOB, pt complained of nausea and had difficulty attending to tasks.  Pt will require continued therapy andw ill require SNF placement to work on restoration fo function    Hussein Vogel, PT  2018

## 2018-09-24 NOTE — CONSULTS
Surgery Consult Note  Attending: Pradeep  Resident: Cedrick   CC: Altered mental status    HPI: Sheryl Martines is a 63 y.o. woman who underwent lap converted to open appendectomy for appendicitis on 8/26 by Dr. Echols at [facility], who is brought from [facility] to the ED for altered mental status.  Is currently admitted to medicine for workup.    The open appendectomy site had opened at the skin, and she was being treated for PNA vs wound infection vs intrabdominal absecss.  Has completed 12 days of vancomycin and 9 days ertapenem.     The fluid collection was never aspirated.  She completed a course of treatment for C diff.    She is not having fevers.  Has abdominal tenderness, which is overall improving.  Appetite is still poor, and she gets minimal nausea when she eats, which is an improvement from prior.      +Flatus and BM today.    Past Medical History:   Diagnosis Date    Asthma     Closed compression fracture of fourth lumbar vertebra     COPD (chronic obstructive pulmonary disease)     Coronary artery disease     Diabetes mellitus     Glaucoma     High cholesterol     Hypertension     Iritis     Pulmonary embolus     Stroke     rt sided weakness.       Past Surgical History:   Procedure Laterality Date    ABDOMINAL SURGERY      APPENDECTOMY N/A 8/26/2018    Procedure: APPENDECTOMY;  Surgeon: Bernadine Melendrez MD;  Location: [facility] OR;  Service: General;  Laterality: N/A;    APPENDECTOMY N/A 8/26/2018    Performed by Bernadine Melendrez MD at [facility] OR    APPENDECTOMY, LAPAROSCOPIC---CONVERTED TO OPEN APPENDECTOMY @0950 N/A 8/26/2018    Performed by Bernadine Melendrez MD at [facility] OR    BACK SURGERY      stimulator    CATARACT EXTRACTION      HYSTERECTOMY      LAPAROSCOPIC APPENDECTOMY N/A 8/26/2018    Procedure: APPENDECTOMY, LAPAROSCOPIC---CONVERTED TO OPEN APPENDECTOMY @0950;  Surgeon: Bernadine Melendrez MD;  Location: [facility] OR;  Service: General;  Laterality: N/A;       Family History    Problem Relation Age of Onset    Cancer Father     Diabetes Brother        Social History     Socioeconomic History    Marital status:      Spouse name: Not on file    Number of children: Not on file    Years of education: Not on file    Highest education level: Not on file   Social Needs    Financial resource strain: Not on file    Food insecurity - worry: Not on file    Food insecurity - inability: Not on file    Transportation needs - medical: Not on file    Transportation needs - non-medical: Not on file   Occupational History    Not on file   Tobacco Use    Smoking status: Never Smoker   Substance and Sexual Activity    Alcohol use: No     Frequency: Never    Drug use: No    Sexual activity: No     Partners: Male   Other Topics Concern    Not on file   Social History Narrative    Not on file       Current Facility-Administered Medications on File Prior to Encounter   Medication Dose Route Frequency Provider Last Rate Last Dose    [MAR Hold - Suspended Admission] acetaminophen tablet 650 mg  650 mg Oral Q6H PRN Julian Carmichael MD        [MAR Hold - Suspended Admission] acetaminophen tablet 650 mg  650 mg Oral Q4H PRN Julian Carmichael MD        [MAR Hold - Suspended Admission] albuterol-ipratropium 2.5 mg-0.5 mg/3 mL nebulizer solution 3 mL  3 mL Nebulization Q6H PRN KOTA Anderson Jr.-JOSE GUADALUPE   3 mL at 09/22/18 2335    [MAR Hold - Suspended Admission] aspirin EC tablet 81 mg  81 mg Oral Daily Julian Carmichael MD   81 mg at 09/23/18 1001    [MAR Hold - Suspended Admission] calcium carbonate 200 mg calcium (500 mg) chewable tablet 500 mg  500 mg Oral BID PRN Julian Carmichael MD        [MAR Hold - Suspended Admission] clopidogrel tablet 75 mg  75 mg Oral Daily Julian Carmichael MD   75 mg at 09/23/18 1002    [MAR Hold - Suspended Admission] dextrose 50% injection 12.5 g  12.5 g Intravenous PRN Julian Carmichael MD        [MAR Hold - Suspended Admission]  dextrose 50% injection 25 g  25 g Intravenous PRN Julian Carmichael MD        [MAR Hold - Suspended Admission] diltiaZEM 24 hr capsule 180 mg  180 mg Oral Daily Julian Carmichael MD   180 mg at 09/23/18 1016    [MAR Hold - Suspended Admission] DULoxetine DR capsule 60 mg  60 mg Oral Daily Julian Carmichael MD   60 mg at 09/23/18 1000    [MAR Hold - Suspended Admission] enoxaparin injection 40 mg  40 mg Subcutaneous Daily Julian Carmichael MD   40 mg at 09/22/18 1754    [MAR Hold - Suspended Admission] ertapenem (INVANZ) 1 g in sodium chloride 0.9 % 100 mL IVPB (ready to mix system)  1 g Intravenous Q24H Julian Carmichael  mL/hr at 09/22/18 2100 1 g at 09/22/18 2100    [MAR Hold - Suspended Admission] furosemide tablet 40 mg  40 mg Oral Daily Julian Carmichael MD   40 mg at 09/23/18 0959    [MAR Hold - Suspended Admission] glucagon (human recombinant) injection 1 mg  1 mg Intramuscular PRN Julian Carmichael MD        [MAR Hold - Suspended Admission] glucose chewable tablet 16 g  16 g Oral PRN Julian Carmichael MD        [MAR Hold - Suspended Admission] glucose chewable tablet 24 g  24 g Oral PRN Julian Carmichael MD        [MAR Hold - Suspended Admission] insulin aspart U-100 pen 0-5 Units  0-5 Units Subcutaneous QID (AC + HS) PRN Julian Carmichael MD   1 Units at 09/21/18 2029    [MAR Hold - Suspended Admission] insulin detemir U-100 pen 10 Units  10 Units Subcutaneous BID Julian Carmichael MD   10 Units at 09/23/18 1002    [MAR Hold - Suspended Admission] Lactobacillus rhamnosus GG capsule 1 capsule  1 capsule Oral BID Annalisa Gallegos NP   1 capsule at 09/23/18 0956    [MAR Hold - Suspended Admission] losartan tablet 100 mg  100 mg Oral Daily Julian Carmichael MD   100 mg at 09/23/18 0958    [MAR Hold - Suspended Admission] ondansetron disintegrating tablet 8 mg  8 mg Oral Q8H PRN Shyam Hannah MD   8 mg at 09/22/18 1804    [MAR Hold - Suspended  Admission] ondansetron tablet 4 mg  4 mg Oral Once Rosemary Jack Jr., LUCRETIA        [MAR Hold - Suspended Admission] pantoprazole EC tablet 40 mg  40 mg Oral Daily Julian Carmichael MD   40 mg at 09/23/18 1001    [MAR Hold - Suspended Admission] prazosin capsule 5 mg  5 mg Oral BID Julian Carmichael MD   5 mg at 09/23/18 0955    [MAR Hold - Suspended Admission] predniSONE tablet 10 mg  10 mg Oral BID Julian Carmichael MD   10 mg at 09/23/18 1001    [MAR Hold - Suspended Admission] ramelteon tablet 8 mg  8 mg Oral Nightly PRN Julian Carmichael MD        [MAR Hold - Suspended Admission] senna-docusate 8.6-50 mg per tablet 1 tablet  1 tablet Oral BID Julian Carmichael MD   1 tablet at 09/22/18 2156    [MAR Hold - Suspended Admission] simethicone chewable tablet 160 mg  160 mg Oral Q6H PRN Annalisa Gallegos NP   160 mg at 09/20/18 2227    [MAR Hold - Suspended Admission] simvastatin tablet 40 mg  40 mg Oral QHS Julian Carmichael MD   40 mg at 09/22/18 2156    [MAR Hold - Suspended Admission] theophylline 24 hr capsule 200 mg  200 mg Oral Daily Julian Carmichael MD   200 mg at 09/23/18 0955    [MAR Hold - Suspended Admission] vancomycin 750 mg in dextrose 5 % 250 mL IVPB (ready to mix system)  750 mg Intravenous Q12H Julian Carmichael .7 mL/hr at 09/23/18 0544 750 mg at 09/23/18 0544    [MAR Hold - Suspended Admission] zinc oxide 20 % ointment   Topical (Top) PRN Julian Carmichael MD         Current Outpatient Medications on File Prior to Encounter   Medication Sig Dispense Refill    albuterol-ipratropium (DUO-NEB) 2.5 mg-0.5 mg/3 mL nebulizer solution Take 3 mLs by nebulization every 4 (four) hours. Rescue      aspirin (ECOTRIN) 81 MG EC tablet Take 1 tablet (81 mg total) by mouth once daily. 30 tablet 11    clopidogrel (PLAVIX) 75 mg tablet Take 75 mg by mouth once daily.      diltiaZEM (CARDIZEM CD) 180 MG 24 hr capsule Take 180 mg by mouth once daily.       DULoxetine (CYMBALTA) 60 MG capsule Take 60 mg by mouth once daily.      enoxaparin (LOVENOX) 40 mg/0.4 mL Syrg Inject 40 mg into the skin once daily.      furosemide (LASIX) 40 MG tablet Take 40 mg by mouth once daily.       insulin aspart U-100 (NOVOLOG) 100 unit/mL injection Inject 0-5 Units into the skin 4 (four) times daily as needed for High Blood Sugar.      insulin detemir U-100 (LEVEMIR) 100 unit/mL injection Inject 10 Units into the skin 2 (two) times daily.       Lactobacillus acidophilus 1 billion cell Cap Take 1 tablet by mouth 2 (two) times daily.      losartan (COZAAR) 100 MG tablet Take 100 mg by mouth once daily.       megestrol (MEGACE) 40 MG Tab Take 40 mg by mouth 2 (two) times daily.      ondansetron (ZOFRAN) 4 MG tablet Take 1 tablet (4 mg total) by mouth every 6 (six) hours as needed. 30 tablet 1    pantoprazole (PROTONIX) 40 MG tablet Take 40 mg by mouth once daily.      prazosin (MINIPRESS) 5 MG capsule Take 5 mg by mouth 2 (two) times daily.       predniSONE (DELTASONE) 10 MG tablet Take 10 mg by mouth 2 (two) times daily.       senna-docusate 8.6-50 mg (SENNA WITH DOCUSATE SODIUM) 8.6-50 mg per tablet Take 1 tablet by mouth 2 (two) times daily.      simethicone 80 mg Tab Take 160 mg by mouth every 6 (six) hours as needed for Flatulence.      simvastatin (ZOCOR) 40 MG tablet Take 40 mg by mouth every evening.      theophylline (THEODUR) 200 MG 12 hr tablet Take 200 mg by mouth once daily.       [DISCONTINUED] albuterol (ACCUNEB) 0.63 mg/3 mL Nebu Take 0.83 mg by nebulization every 6 (six) hours as needed. Rescue       [DISCONTINUED] baclofen (LIORESAL) 10 MG tablet Take 10 mg by mouth 3 (three) times daily.      [DISCONTINUED] cloNIDine 0.2 mg/24 hr td ptwk (CATAPRES) 0.2 mg/24 hr Place 1 patch onto the skin every 7 days.      [DISCONTINUED] diclofenac sodium 1 % Gel Apply topically once daily. for 10 days as directed 100 g 2    [DISCONTINUED] fluticasone-salmeterol  500-50 mcg/dose (ADVAIR DISKUS) 500-50 mcg/dose DsDv diskus inhaler Inhale 1 puff into the lungs 2 (two) times daily. Controller      [DISCONTINUED] gabapentin (NEURONTIN) 300 MG capsule Take 300 mg by mouth 3 (three) times daily.      [DISCONTINUED] insulin lispro (HUMALOG PEN SUBQ) Inject into the skin.      [DISCONTINUED] lansoprazole (PREVACID) 30 MG capsule Take 30 mg by mouth once daily.      [DISCONTINUED] pyridoxine, vitamin B6, (VITAMIN B-6) 50 MG Tab Take 50 mg by mouth once daily.      [DISCONTINUED] sitagliptin phosphate (JANUVIA ORAL) Take by mouth.      [DISCONTINUED] theophylline anhydrous (UNIPHYL ORAL) Take by mouth.      [DISCONTINUED] traMADol (ULTRAM-ER) 100 MG Tb24 Take 50 mg by mouth daily as needed.         Review of patient's allergies indicates:   Allergen Reactions    Ace inhibitors Swelling    Corticosteroids (glucocorticoids)     Hydralazine analogues     Tetracyclines Swelling    Travatan (with benzalkonium) [travoprost (benzalkonium)]        ROS: Constitutional: no fever or chills, pain controlled   Respiratory: no cough or shortness of breath   Cardiovascular: no chest pain or palpitations   Genitourinary: no hematuria or dysuria   Hematologic/Lymphatic: no easy bruising or lymphadenopathy   Musculoskeletal: no arthralgias or myalgias   Neurological: no seizures or tremors     Phys:  Vitals:    09/23/18 1421 09/23/18 1521 09/23/18 1621 09/23/18 1822   BP: (!) 179/84 (!) 143/85 (!) 162/79 (!) 161/108   Pulse: 94 (!) 116  94   Resp:       Temp:       TempSrc:       SpO2: 98% 99% 100% (!) 94%   Weight:       Height:         Phys:   Gen: NAD   HEENT: NCAT, trachea midline  CV: RRR, no m/r/g   Pulm: Unlabored  Abd: Soft, mild-moderate ttp RLQ > LLQ. No rebound, guarding.  Incision is healing, is only open mildly now.  No purulence, and no surrounding cellulitis.  Extremities: no cyanosis or edema, or clubbing  Skin: Skin color, texture, turgor normal. No rashes or lesions     CT  "(9/23): R pelvic fluid collection, 5.7 cm in size, decreased from 7.1 cm on 9/10    A/P 63 year old woman with altered mental status.  Has RLQ fluid collection    Fluid collection has been present since CT on 8/31, and is decreasing in size on its own.  May be hematoma or seroma    If desired, could ask IR to aspirate RLQ fluid collection.  Recommend against this at this time, as she is afebrile and without leukocytosis.      ID at OSH recommends "4 weeks antibiotics after discharge with re imaging as outpatient 2 weeks post discharge, in the middle of the course to assess improvement in fluid collection"      Skin incision without signs of active infection.     Continue medical workup for AMS    Please page or call with further questions    Galindo Philip MD  General Surgery, PGY-5  [phone]          I have personally performed a detailed history and physical examination on this patient. My findings are summarized in the resident's note included in the record.  Unusual treatment for intra-abdominal infection after appendicitis  Agree with resident's note that since she is without signs of infection would probably continue current plan of long term antibiotics  Will monitor  "

## 2018-09-24 NOTE — PLAN OF CARE
Problem: SLP Goal  Goal: SLP Goal  Speech Language Pathology Goals  Goals expected to be met by 10/1  1. Pt will tolerate puree & thin liquids without s/s aspiration  2. Pt will tolerate trials of solids to determine when appropriate for diet upgrade  SLP Clinical Swallow Evaluation completed. See note for details.    Mary Chambers MS, CCC-SLP  Speech Language Pathologist  Pager: [phone]  9/24/2018

## 2018-09-24 NOTE — ASSESSMENT & PLAN NOTE
Pt recently hospitalized for appendicitis with peritonitis. She had open appendectomy 8/26 with wound cultures positive for MRSA, enterococcus, and E.col. She was discharged with PO vanc (stop Sept 13), clinda (1 week), and flagyl (stop Sept 13).   At [facility] , she was on Ertapenem (started 9/20) and Vancomycin (started 9/21) until this transfered to ED for present admission    Plan:  - CT abdomen  - Consult Gen Surg  - NPO until evaluated by surgery  - Broad spec abx coverage

## 2018-09-24 NOTE — H&P
[facility] Medicine  History & Physical    Patient Name: Sheryl Martines  MRN: [id number]  Admission Date: 9/23/2018  Attending Physician: Servando García, *   Primary Care Provider: Primary Doctor [facility] Medicine Team: [facility] [place] [facility] 1 Ashley Willett MD     Patient information was obtained from patient, relative(s), past medical records and ER records.     Subjective:     Principal Problem:Altered mental status    Chief Complaint:   Chief Complaint   Patient presents with    Weakness     pt sent from [facility] with weakness and lethargy x 2 days, pt currently being treated for abd wound infection.         HPI: Ms. Martines is a 63-year-old female with recent appendicitis s/p appendectomy, COPD, IDDM, CAD, pontine stroke (2012) presents from SNF for intermittent altered mental status (per SNF) and lethargy of 2 days. Patient denies AMS herself and is attributing drowsiness to pain medications.   She also c/o loose stool and dizziness. She denied fever, chills, headache, chest pain, SOB, nausea, vomiting, dysuria.    Patient currently living in rehab facility for reconditioning and treatment of wound infection s/p appendectomy on 8/26. On previous admission, she presented with slurred speech and AMS. EEG was done at the time and did not show any seizure activity. She found to have appendicitis with peritonitis, and underwent open appendectomy. Her abdominal wound cultures positive for  MRSA, enterococcus, & e. coli.  Also positive for c. diff during admission. She was discharged with PO vanc (stop Sept 13), clinda (1 week), and flagyl (stop Sept 13) per ID/ surgery.    At SNF, she was on Ertapenem (started 9/20) and Vancomycin (started 9/21) until this transfered to ED for present admission.      Past Medical History:   Diagnosis Date    Asthma     Closed compression fracture of fourth lumbar vertebra     COPD (chronic obstructive pulmonary disease)     Coronary  artery disease     Diabetes mellitus     Glaucoma     High cholesterol     Hypertension     Iritis     Pulmonary embolus     Stroke     rt sided weakness.       Past Surgical History:   Procedure Laterality Date    ABDOMINAL SURGERY      APPENDECTOMY N/A 8/26/2018    Procedure: APPENDECTOMY;  Surgeon: Bernadine Melendrez MD;  Location: [facility] OR;  Service: General;  Laterality: N/A;    APPENDECTOMY N/A 8/26/2018    Performed by Bernadine Melendrez MD at [facility] OR    APPENDECTOMY, LAPAROSCOPIC---CONVERTED TO OPEN APPENDECTOMY @0950 N/A 8/26/2018    Performed by Bernadine Melendrez MD at [facility] OR    BACK SURGERY      stimulator    CATARACT EXTRACTION      HYSTERECTOMY      LAPAROSCOPIC APPENDECTOMY N/A 8/26/2018    Procedure: APPENDECTOMY, LAPAROSCOPIC---CONVERTED TO OPEN APPENDECTOMY @0950;  Surgeon: Bernadine Melendrez MD;  Location: [facility] OR;  Service: General;  Laterality: N/A;       Review of patient's allergies indicates:   Allergen Reactions    Ace inhibitors Swelling    Corticosteroids (glucocorticoids)     Hydralazine analogues     Tetracyclines Swelling    Travatan (with benzalkonium) [travoprost (benzalkonium)]        Current Facility-Administered Medications on File Prior to Encounter   Medication    [MAR Hold - Suspended Admission] acetaminophen tablet 650 mg    [MAR Hold - Suspended Admission] acetaminophen tablet 650 mg    [MAR Hold - Suspended Admission] albuterol-ipratropium 2.5 mg-0.5 mg/3 mL nebulizer solution 3 mL    [MAR Hold - Suspended Admission] aspirin EC tablet 81 mg    [MAR Hold - Suspended Admission] calcium carbonate 200 mg calcium (500 mg) chewable tablet 500 mg    [MAR Hold - Suspended Admission] clopidogrel tablet 75 mg    [MAR Hold - Suspended Admission] dextrose 50% injection 12.5 g    [MAR Hold - Suspended Admission] dextrose 50% injection 25 g    [MAR Hold - Suspended Admission] diltiaZEM 24 hr capsule 180 mg    [MAR Hold - Suspended Admission] DULoxetine  DR capsule 60 mg    [MAR Hold - Suspended Admission] enoxaparin injection 40 mg    [MAR Hold - Suspended Admission] ertapenem (INVANZ) 1 g in sodium chloride 0.9 % 100 mL IVPB (ready to mix system)    [MAR Hold - Suspended Admission] furosemide tablet 40 mg    [MAR Hold - Suspended Admission] glucagon (human recombinant) injection 1 mg    [MAR Hold - Suspended Admission] glucose chewable tablet 16 g    [MAR Hold - Suspended Admission] glucose chewable tablet 24 g    [MAR Hold - Suspended Admission] insulin aspart U-100 pen 0-5 Units    [MAR Hold - Suspended Admission] insulin detemir U-100 pen 10 Units    [MAR Hold - Suspended Admission] Lactobacillus rhamnosus GG capsule 1 capsule    [MAR Hold - Suspended Admission] losartan tablet 100 mg    [MAR Hold - Suspended Admission] ondansetron disintegrating tablet 8 mg    [MAR Hold - Suspended Admission] ondansetron tablet 4 mg    [MAR Hold - Suspended Admission] pantoprazole EC tablet 40 mg    [MAR Hold - Suspended Admission] prazosin capsule 5 mg    [MAR Hold - Suspended Admission] predniSONE tablet 10 mg    [MAR Hold - Suspended Admission] ramelteon tablet 8 mg    [MAR Hold - Suspended Admission] senna-docusate 8.6-50 mg per tablet 1 tablet    [MAR Hold - Suspended Admission] simethicone chewable tablet 160 mg    [MAR Hold - Suspended Admission] simvastatin tablet 40 mg    [MAR Hold - Suspended Admission] theophylline 24 hr capsule 200 mg    [MAR Hold - Suspended Admission] vancomycin 750 mg in dextrose 5 % 250 mL IVPB (ready to mix system)    [MAR Hold - Suspended Admission] zinc oxide 20 % ointment     Current Outpatient Medications on File Prior to Encounter   Medication Sig    albuterol-ipratropium (DUO-NEB) 2.5 mg-0.5 mg/3 mL nebulizer solution Take 3 mLs by nebulization every 4 (four) hours. Rescue    aspirin (ECOTRIN) 81 MG EC tablet Take 1 tablet (81 mg total) by mouth once daily.    clopidogrel (PLAVIX) 75 mg tablet Take 75 mg by mouth  once daily.    diltiaZEM (CARDIZEM CD) 180 MG 24 hr capsule Take 180 mg by mouth once daily.    DULoxetine (CYMBALTA) 60 MG capsule Take 60 mg by mouth once daily.    enoxaparin (LOVENOX) 40 mg/0.4 mL Syrg Inject 40 mg into the skin once daily.    furosemide (LASIX) 40 MG tablet Take 40 mg by mouth once daily.     insulin aspart U-100 (NOVOLOG) 100 unit/mL injection Inject 0-5 Units into the skin 4 (four) times daily as needed for High Blood Sugar.    insulin detemir U-100 (LEVEMIR) 100 unit/mL injection Inject 10 Units into the skin 2 (two) times daily.     Lactobacillus acidophilus 1 billion cell Cap Take 1 tablet by mouth 2 (two) times daily.    losartan (COZAAR) 100 MG tablet Take 100 mg by mouth once daily.     megestrol (MEGACE) 40 MG Tab Take 40 mg by mouth 2 (two) times daily.    ondansetron (ZOFRAN) 4 MG tablet Take 1 tablet (4 mg total) by mouth every 6 (six) hours as needed.    pantoprazole (PROTONIX) 40 MG tablet Take 40 mg by mouth once daily.    prazosin (MINIPRESS) 5 MG capsule Take 5 mg by mouth 2 (two) times daily.     predniSONE (DELTASONE) 10 MG tablet Take 10 mg by mouth 2 (two) times daily.     senna-docusate 8.6-50 mg (SENNA WITH DOCUSATE SODIUM) 8.6-50 mg per tablet Take 1 tablet by mouth 2 (two) times daily.    simethicone 80 mg Tab Take 160 mg by mouth every 6 (six) hours as needed for Flatulence.    simvastatin (ZOCOR) 40 MG tablet Take 40 mg by mouth every evening.    theophylline (THEODUR) 200 MG 12 hr tablet Take 200 mg by mouth once daily.     [DISCONTINUED] albuterol (ACCUNEB) 0.63 mg/3 mL Nebu Take 0.83 mg by nebulization every 6 (six) hours as needed. Rescue     [DISCONTINUED] baclofen (LIORESAL) 10 MG tablet Take 10 mg by mouth 3 (three) times daily.    [DISCONTINUED] cloNIDine 0.2 mg/24 hr td ptwk (CATAPRES) 0.2 mg/24 hr Place 1 patch onto the skin every 7 days.    [DISCONTINUED] diclofenac sodium 1 % Gel Apply topically once daily. for 10 days as directed     [DISCONTINUED] fluticasone-salmeterol 500-50 mcg/dose (ADVAIR DISKUS) 500-50 mcg/dose DsDv diskus inhaler Inhale 1 puff into the lungs 2 (two) times daily. Controller    [DISCONTINUED] gabapentin (NEURONTIN) 300 MG capsule Take 300 mg by mouth 3 (three) times daily.    [DISCONTINUED] insulin lispro (HUMALOG PEN SUBQ) Inject into the skin.    [DISCONTINUED] lansoprazole (PREVACID) 30 MG capsule Take 30 mg by mouth once daily.    [DISCONTINUED] pyridoxine, vitamin B6, (VITAMIN B-6) 50 MG Tab Take 50 mg by mouth once daily.    [DISCONTINUED] sitagliptin phosphate (JANUVIA ORAL) Take by mouth.    [DISCONTINUED] theophylline anhydrous (UNIPHYL ORAL) Take by mouth.    [DISCONTINUED] traMADol (ULTRAM-ER) 100 MG Tb24 Take 50 mg by mouth daily as needed.     Family History     Problem Relation (Age of Onset)    Cancer Father    Diabetes Brother        Tobacco Use    Smoking status: Never Smoker   Substance and Sexual Activity    Alcohol use: No     Frequency: Never    Drug use: No    Sexual activity: No     Partners: Male     Review of Systems   Constitutional: Positive for activity change and fatigue. Negative for appetite change, chills, diaphoresis and fever.   HENT: Positive for trouble swallowing (pills). Negative for facial swelling and sore throat.    Eyes: Negative for photophobia, pain and visual disturbance.   Respiratory: Negative for cough, chest tightness, shortness of breath and wheezing.    Cardiovascular: Negative for chest pain, palpitations and leg swelling.   Gastrointestinal: Positive for abdominal pain and diarrhea. Negative for blood in stool, constipation, nausea and vomiting.   Genitourinary: Negative for difficulty urinating, dysuria and flank pain.   Musculoskeletal: Positive for arthralgias, back pain and gait problem. Negative for neck pain and neck stiffness.   Skin: Positive for wound (Surgical scar, RLQ).   Neurological: Positive for dizziness and weakness. Negative for syncope,  numbness and headaches.   Hematological: Bruises/bleeds easily.   Psychiatric/Behavioral: Positive for confusion. Negative for hallucinations. The patient is not nervous/anxious.      Objective:     Vital Signs (Most Recent):  Temp: 97.2 °F (36.2 °C) (09/23/18 1909)  Pulse: 84 (09/23/18 1909)  Resp: (!) 24 (09/23/18 1909)  BP: (!) 183/89 (09/23/18 1909)  SpO2: 98 % (09/23/18 1909) Vital Signs (24h Range):  Temp:  [96.9 °F (36.1 °C)-97.8 °F (36.6 °C)] 97.2 °F (36.2 °C)  Pulse:  [] 84  Resp:  [18-24] 24  SpO2:  [94 %-100 %] 98 %  BP: (129-183)/() 183/89     Weight: 73 kg (161 lb)  Body mass index is 31.44 kg/m².    Physical Exam   Constitutional: She is oriented to person, place, and time. She appears well-developed and well-nourished. No distress.   HENT:   Head: Normocephalic and atraumatic.   Mouth/Throat: Oropharynx is clear and moist.   Eyes: Conjunctivae and EOM are normal. Pupils are equal, round, and reactive to light. Right eye exhibits no discharge. Left eye exhibits no discharge.   Neck: Normal range of motion. Neck supple.   Cardiovascular: Normal rate, regular rhythm, normal heart sounds and intact distal pulses.   Pulmonary/Chest: Effort normal and breath sounds normal. No respiratory distress. She has no wheezes. She has no rales. She exhibits no tenderness.   Abdominal: Soft. Bowel sounds are normal. There is generalized tenderness. There is no rebound and no guarding.       Musculoskeletal: She exhibits no edema, tenderness or deformity.   Spinal cord stimulator in place   Lymphadenopathy:     She has no cervical adenopathy.   Neurological: She is alert and oriented to person, place, and time.   Intermittent periods of lethargy & confusion during exam   Skin: Skin is warm and dry. She is not diaphoretic.   Psychiatric: Her speech is rapid and/or pressured and slurred. She is not agitated and not combative. She is inattentive.         CRANIAL NERVES     CN III, IV, VI   Pupils are equal,  round, and reactive to light.  Extraocular motions are normal.        Significant Labs:   A1C:   Recent Labs   Lab  08/23/18   0217   HGBA1C  8.1*     ABGs:   Recent Labs   Lab  09/23/18   1251   PH  7.517*   PCO2  43.2   HCO3  35.1*   POCSATURATED  90*   BE  12     Blood Culture:   Recent Labs   Lab  09/23/18   1250   LABBLOO  No Growth to date     CBC:   Recent Labs   Lab  09/22/18   1126  09/23/18   1250   WBC  7.98  9.13   HGB  11.1*  12.0   HCT  35.8*  39.3   PLT  208  228     CMP:   Recent Labs   Lab  09/22/18   1126  09/23/18   1250   NA  139  140   K  3.5  3.7   CL  101  102   CO2  30*  30*   GLU  108  163*   BUN  13  18   CREATININE  0.8  1.4   CALCIUM  8.8  9.4   PROT   --   6.4   ALBUMIN   --   2.9*   BILITOT   --   0.5   ALKPHOS   --   103   AST   --   26   ALT   --   24   ANIONGAP  8  8   EGFRNONAA  >60.0  40.0*     Lactic Acid:   Recent Labs   Lab  09/23/18   1250  09/23/18   1804   LACTATE  1.7  1.2       Significant Imaging: I have reviewed all pertinent imaging results/findings within the past 24 hours.    Assessment/Plan:     * Altered mental status    Pt presents from SNF with AMS and abdomen exquisitely tender to palpation (although pt denies AMS and abdominal pain during review of systems). DDx include infection, dehydration, medication induced, & delirium.   - CT head showed no acute path    Plan:  - Started Vanc and Zosyn, pending BCx results  - Pt given IVFs: NS 30 ml/kg (2190 cc)  - Holding Cymbalta  - Avoid opioids for now  - Delirium precautions        S/P appendectomy    Pt recently hospitalized for appendicitis with peritonitis. She had open appendectomy 8/26 with wound cultures positive for MRSA, enterococcus, and E.col. She was discharged with PO vanc (stop Sept 13), clinda (1 week), and flagyl (stop Sept 13).   At [facility] , she was on Ertapenem (started 9/20) and Vancomycin (started 9/21) until this transfered to ED for present admission    Plan:  - CT abdomen  - Consult Gen Surg  - NPO  until evaluated by surgery  - Broad spec abx coverage         Insulin dependent diabetes mellitus    - 7 U detemir BID  - LDSSI  - Diabetic diet          Gastroesophageal reflux disease without esophagitis    - Pantoprazole 40 mg           Debilitated patient    - PT/OT          (HFpEF) heart failure with preserved ejection fraction    ESSENTIAL HYPERTENSION    Last ECHO 8/23/18 showing EF 60-65%, grade 1 diastolic dysfunction     - Lasix 40 mg daily  - Losartan 100 mg daily  - Prazosin 5 mg BID        Moderate asthma without complication    - Breo daily  - Theophylline 200 mg daily  -  Albuterol PRN                Clostridium difficile infection    Recent hospitalization positive for C. Diff infection. Treated with PO vanc and flagyl.   Pt presently reporting loose, but not watery, stool    - Repeat C. Diff panel          NIKHIL (acute kidney injury)    Cr 1.4 on admit. Baseline 0.8.     - Will give IVFs   - Monitor BMP        Closed compression fracture of fourth lumbar vertebra    Pt with chronic back pain. S/p spinal stimulator placement    - Avoiding opiates for now because of AMS  - Baclofen PRN for pain        CAD (coronary artery disease)    - ASA 81  - Plavix 75  - Simvastatin 40             VTE Risk Mitigation (From admission, onward)        Ordered     heparin (porcine) injection 5,000 Units  Every 8 hours      09/23/18 2039             Ashley Willett MD  Department of Hospital Medicine   [facility]

## 2018-09-24 NOTE — ASSESSMENT & PLAN NOTE
ESSENTIAL HYPERTENSION    Last ECHO 8/23/18 showing EF 60-65%, grade 1 diastolic dysfunction     - Lasix 40 mg daily  - Losartan 100 mg daily  - Prazosin 5 mg BID

## 2018-09-24 NOTE — MEDICAL/APP STUDENT
[facility] Medicine                                   History & Physical    Patient Name: Sheryl Martines  MRN: [id number]  Patient Class: OP- Observation   Admission Date: 9/23/2018  Length of Stay: 0 days  Attending Physician: Servando García, *  Primary Care Provider: Primary Doctor [facility] Medicine Team: [facility] [place] [facility] [place]    SUBJECTIVE:     Chief Complaint/Reason for Admission: Altered mental status    History of Present Illness:  Ms. Martines is a 63-year-old female with recent appendicitis s/p appendectomy, COPD, IDDM, CAD, pontine stroke (2012) presents from SNF to the ED for intermittent altered mental status (per SNF) and lethargy of 2 days. Patient and daughter denies AMS and is attributing drowsiness to pain medications.   She also c/o loose stool, dizziness, and dysuria. She denied fever, chills, headache, chest pain, SOB, nausea, and vomiting.     Patient currently living in rehab facility for reconditioning and treatment of wound infection s/p appendectomy on 8/26. On previous admission, she presented with slurred speech and AMS. EEG was done at the time and did not show any seizure activity. She found to have appendicitis with peritonitis, and underwent open appendectomy. Her abdominal wound cultures positive for  MRSA, enterococcus, & e. coli.  Also positive for c. diff during admission. She was discharged with PO vanc (stop Sept 13), clinda (1 week), and flagyl (stop Sept 13) per ID/ surgery.      At SNF, she was on Ertapenem (started 9/20) and Vancomycin (started 9/21) until this transfered to ED for present admission.    Interval History: Patient is agitated and refused to answer questions. Per nurses, patient is constantly yelling and pushing the call button. Last night she was ins NSR however around 4am she had some PVCs and HR in to the 190s for 3 seconds.     Review of Systems:  Review of Systems   Constitutional: Positive for activity change  and fatigue. Negative for appetite change, chills, diaphoresis and fever.   HENT: Positive for trouble swallowing (pills). Negative for facial swelling and sore throat.    Eyes: Negative for photophobia, pain and visual disturbance.   Respiratory: Negative for cough, chest tightness, shortness of breath and wheezing.    Cardiovascular: Negative for chest pain, palpitations and leg swelling.   Gastrointestinal: Positive for abdominal pain and diarrhea. Negative for blood in stool, constipation, nausea and vomiting.   Genitourinary: Positive for dysuria. Negative for difficulty urinating, and flank pain.   Musculoskeletal: Positive for arthralgias, back pain and gait problem. Negative for neck pain and neck stiffness.   Skin: Positive for wound (Surgical scar, RLQ).   Neurological: Positive for dizziness and weakness. Negative for syncope, numbness and headaches.   Hematological: Bruises/bleeds easily.   Psychiatric/Behavioral: Positive for confusion. Negative for hallucinations. The patient is not nervous/anxious.        Past Medical History:   Diagnosis Date    Asthma     Closed compression fracture of fourth lumbar vertebra     COPD (chronic obstructive pulmonary disease)     Coronary artery disease     Diabetes mellitus     Glaucoma     High cholesterol     Hypertension     Iritis     Pulmonary embolus      Pontine Stroke     rt sided weakness.    Neuropathy       Past Surgical History:   Procedure Laterality Date    ABDOMINAL SURGERY      APPENDECTOMY N/A 8/26/2018    Procedure: APPENDECTOMY;  Surgeon: Bernadine Melendrez MD;  Location: [facility] OR;  Service: General;  Laterality: N/A;    APPENDECTOMY N/A 8/26/2018    Performed by Bernadine Melendrez MD at [facility] OR    APPENDECTOMY, LAPAROSCOPIC---CONVERTED TO OPEN APPENDECTOMY @0950 N/A 8/26/2018    Performed by Bernadine Melendrez MD at [facility] OR    BACK SURGERY      stimulator    CATARACT EXTRACTION      HYSTERECTOMY      LAPAROSCOPIC  APPENDECTOMY N/A 8/26/2018    Procedure: APPENDECTOMY, LAPAROSCOPIC---CONVERTED TO OPEN APPENDECTOMY @0950;  Surgeon: Bernadine Melendrez MD;  Location: [facility];  Service: General;  Laterality: N/A;       Review of patient's allergies indicates:   Allergen Reactions    Ace inhibitors Swelling    Corticosteroids (glucocorticoids)     Hydralazine analogues     Tetracyclines Swelling    Travatan (with benzalkonium) [travoprost (benzalkonium)]        Prior to Admission medications    Medication Sig Start Date End Date Taking? Authorizing Provider   albuterol-ipratropium (DUO-NEB) 2.5 mg-0.5 mg/3 mL nebulizer solution Take 3 mLs by nebulization every 4 (four) hours. Rescue   Yes Historical Provider, MD   aspirin (ECOTRIN) 81 MG EC tablet Take 1 tablet (81 mg total) by mouth once daily. 9/5/18 9/5/19 Yes Julian Carmichael MD   clopidogrel (PLAVIX) 75 mg tablet Take 75 mg by mouth once daily.   Yes Historical Provider, MD   diltiaZEM (CARDIZEM CD) 180 MG 24 hr capsule Take 180 mg by mouth once daily.   Yes Historical Provider, MD   DULoxetine (CYMBALTA) 60 MG capsule Take 60 mg by mouth once daily.   Yes Historical Provider, MD   enoxaparin (LOVENOX) 40 mg/0.4 mL Syrg Inject 40 mg into the skin once daily.   Yes Historical Provider, MD   furosemide (LASIX) 40 MG tablet Take 40 mg by mouth once daily.    Yes Historical Provider, MD   insulin aspart U-100 (NOVOLOG) 100 unit/mL injection Inject 0-5 Units into the skin 4 (four) times daily as needed for High Blood Sugar.   Yes Historical Provider, MD   insulin detemir U-100 (LEVEMIR) 100 unit/mL injection Inject 10 Units into the skin 2 (two) times daily.    Yes Historical Provider, MD   Lactobacillus acidophilus 1 billion cell Cap Take 1 tablet by mouth 2 (two) times daily.   Yes Historical Provider, MD   losartan (COZAAR) 100 MG tablet Take 100 mg by mouth once daily.    Yes Historical Provider, MD   megestrol (MEGACE) 40 MG Tab Take 40 mg by mouth 2 (two) times  daily.   Yes Historical Provider, MD   ondansetron (ZOFRAN) 4 MG tablet Take 1 tablet (4 mg total) by mouth every 6 (six) hours as needed. 9/4/18  Yes Julian Carmichael MD   pantoprazole (PROTONIX) 40 MG tablet Take 40 mg by mouth once daily.   Yes Historical Provider, MD   prazosin (MINIPRESS) 5 MG capsule Take 5 mg by mouth 2 (two) times daily.    Yes Historical Provider, MD   predniSONE (DELTASONE) 10 MG tablet Take 10 mg by mouth 2 (two) times daily.    Yes Historical Provider, MD   senna-docusate 8.6-50 mg (SENNA WITH DOCUSATE SODIUM) 8.6-50 mg per tablet Take 1 tablet by mouth 2 (two) times daily.   Yes Historical Provider, MD   simethicone 80 mg Tab Take 160 mg by mouth every 6 (six) hours as needed for Flatulence.   Yes Historical Provider, MD   simvastatin (ZOCOR) 40 MG tablet Take 40 mg by mouth every evening.   Yes Historical Provider, MD   theophylline (THEODUR) 200 MG 12 hr tablet Take 200 mg by mouth once daily.    Yes Historical Provider, MD       Family History   Problem Relation Age of Onset    Cancer Father     Diabetes Brother     MS Mother     SLE Sister        Social History     Tobacco Use    Smoking status: Never Smoker   Substance Use Topics    Alcohol use: No     Frequency: Never    Drug use: No            OBJECTIVE:     Vital Signs (Most Recent):  Temp: 96.2 °F (35.7 °C) (09/24/18 0817)  Pulse: 81 (09/24/18 0657)  Resp: (!) 24 (09/24/18 0433)  BP: (!) 167/92 (09/24/18 0817)  SpO2: (!) 90 % (09/24/18 0657) Vital Signs (24h Range):  Temp:  [96.2 °F (35.7 °C)-97.7 °F (36.5 °C)] 96.2 °F (35.7 °C)  Pulse:  [] 81  Resp:  [20-24] 24  SpO2:  [90 %-100 %] 90 %  BP: (132-187)/() 167/92     Wt Readings from Last 1 Encounters:   09/24/18 0200 74.6 kg (164 lb 6.4 oz)   09/23/18 1159 73 kg (161 lb)       Physical Exam   Constitutional: She appears lethargic. She is uncooperative.   Eyes: Lids are normal.   Patient refused to open eyes   Neck: Trachea normal. Muscular tenderness  present. No edema present.   Cardiovascular: S1 normal, S2 normal and normal heart sounds.   Pulmonary/Chest: Breath sounds normal. No respiratory distress. She has no wheezes. She has no rales.   Abdominal: Bowel sounds are normal. She exhibits distension. She exhibits no mass. There is generalized tenderness. There is guarding. There is no rebound.   Neurological: She appears lethargic.   Skin: Skin is warm and dry.   Psychiatric: Her affect is angry. Her speech is rapid and/or pressured and slurred. She is agitated and aggressive.   Nursing note and vitals reviewed.      Laboratory:  Recent Labs   Lab  09/23/18   1303   COLORU  Yellow   SPECGRAV  1.010   PHUR  7.0   PROTEINUA  Negative   BACTERIA  Occasional   NITRITE  Negative   LEUKOCYTESUR  Trace*   UROBILINOGEN  Negative   HYALINECASTS  6*       Recent Results (from the past 24 hour(s))   Blood culture x two cultures. Draw prior to antibiotics.    Collection Time: 09/23/18 12:50 PM   Result Value Ref Range    Blood Culture, Routine No Growth to date    CBC auto differential    Collection Time: 09/23/18 12:50 PM   Result Value Ref Range    WBC 9.13 3.90 - 12.70 K/uL    RBC 4.54 4.00 - 5.40 M/uL    Hemoglobin 12.0 12.0 - 16.0 g/dL    Hematocrit 39.3 37.0 - 48.5 %    MCV 87 82 - 98 fL    MCH 26.4 (L) 27.0 - 31.0 pg    MCHC 30.5 (L) 32.0 - 36.0 g/dL    RDW 19.2 (H) 11.5 - 14.5 %    Platelets 228 150 - 350 K/uL    MPV 9.6 9.2 - 12.9 fL    Immature Granulocytes 0.3 0.0 - 0.5 %    Gran # (ANC) 6.8 1.8 - 7.7 K/uL    Immature Grans (Abs) 0.03 0.00 - 0.04 K/uL    Lymph # 1.6 1.0 - 4.8 K/uL    Mono # 0.7 0.3 - 1.0 K/uL    Eos # 0.0 0.0 - 0.5 K/uL    Baso # 0.01 0.00 - 0.20 K/uL    nRBC 0 0 /100 WBC    Gran% 74.5 (H) 38.0 - 73.0 %    Lymph% 17.1 (L) 18.0 - 48.0 %    Mono% 7.6 4.0 - 15.0 %    Eosinophil% 0.4 0.0 - 8.0 %    Basophil% 0.1 0.0 - 1.9 %    Differential Method Automated    Comprehensive metabolic panel    Collection Time: 09/23/18 12:50 PM   Result Value Ref  Range    Sodium 140 136 - 145 mmol/L    Potassium 3.7 3.5 - 5.1 mmol/L    Chloride 102 95 - 110 mmol/L    CO2 30 (H) 23 - 29 mmol/L    Glucose 163 (H) 70 - 110 mg/dL    BUN, Bld 18 8 - 23 mg/dL    Creatinine 1.4 0.5 - 1.4 mg/dL    Calcium 9.4 8.7 - 10.5 mg/dL    Total Protein 6.4 6.0 - 8.4 g/dL    Albumin 2.9 (L) 3.5 - 5.2 g/dL    Total Bilirubin 0.5 0.1 - 1.0 mg/dL    Alkaline Phosphatase 103 55 - 135 U/L    AST 26 10 - 40 U/L    ALT 24 10 - 44 U/L    Anion Gap 8 8 - 16 mmol/L    eGFR if African American 46.1 (A) >60 mL/min/1.73 m^2    eGFR if non African American 40.0 (A) >60 mL/min/1.73 m^2   Lactic acid, plasma #1    Collection Time: 09/23/18 12:50 PM   Result Value Ref Range    Lactate (Lactic Acid) 1.7 0.5 - 2.2 mmol/L   ISTAT PROCEDURE    Collection Time: 09/23/18 12:51 PM   Result Value Ref Range    POC PH 7.517 (H) 7.35 - 7.45    POC PCO2 43.2 35 - 45 mmHg    POC PO2 52 40 - 60 mmHg    POC HCO3 35.1 (H) 24 - 28 mmol/L    POC BE 12 -2 to 2 mmol/L    POC SATURATED O2 90 (L) 95 - 100 %    POC Lactate 1.32 0.5 - 2.2 mmol/L    POC TCO2 36 (H) 24 - 29 mmol/L    Sample VENOUS     Site Other     Allens Test N/A     DelSys Room Air     Mode SPONT    Urinalysis, Reflex to Urine Culture Urine, Clean Catch    Collection Time: 09/23/18  1:03 PM   Result Value Ref Range    Specimen UA Urine, Clean Catch     Color, UA Yellow Yellow, Straw, Amanda    Appearance, UA Clear Clear    pH, UA 7.0 5.0 - 8.0    Specific Gravity, UA 1.010 1.005 - 1.030    Protein, UA Negative Negative    Glucose, UA Negative Negative    Ketones, UA Negative Negative    Bilirubin (UA) Negative Negative    Occult Blood UA Negative Negative    Nitrite, UA Negative Negative    Urobilinogen, UA Negative <2.0 EU/dL    Leukocytes, UA Trace (A) Negative   Urinalysis Microscopic    Collection Time: 09/23/18  1:03 PM   Result Value Ref Range    RBC, UA 1 0 - 4 /hpf    WBC, UA 2 0 - 5 /hpf    Bacteria, UA Occasional None-Occ /hpf    Squam Epithel, UA 3 /hpf     "Hyaline Casts, UA 6 (A) 0-1/lpf /lpf    Microscopic Comment SEE COMMENT    Blood culture x two cultures. Draw prior to antibiotics.    Collection Time: 09/23/18  2:07 PM   Result Value Ref Range    Blood Culture, Routine No Growth to date    Lactic acid, plasma #2    Collection Time: 09/23/18  6:04 PM   Result Value Ref Range    Lactate (Lactic Acid) 1.2 0.5 - 2.2 mmol/L   POCT glucose    Collection Time: 09/23/18  9:39 PM   Result Value Ref Range    POCT Glucose 124 (H) 70 - 110 mg/dL   POCT glucose    Collection Time: 09/24/18  8:06 AM   Result Value Ref Range    POCT Glucose 82 70 - 110 mg/dL       Lab Results   Component Value Date    INR 1.1 09/10/2018    INR 0.9 08/23/2018       Lab Results   Component Value Date    HGBA1C 8.1 (H) 08/23/2018       Recent Labs      09/21/18   1609  09/21/18   2014  09/22/18   0655  09/22/18   1116  09/22/18   1753  09/22/18   2042  09/23/18   0709  09/23/18   2139   POCTGLUCOSE  78  219*  108  104  122*  137*  134*  124*       Diagnostic Results:  {Results; Diagnostics:[id number]::"***"}    Imaging Results          CT Abdomen Pelvis With Contrast (Final result)     Abnormal  Result time 09/23/18 16:46:21    Final result by Erma Munroe MD (09/23/18 16:46:21)                 Impression:      Interval resolution of wound dehiscence involving the right lateral abdominal wall.    Interval improvement of inflammatory changes adjacent to the area of recent appendectomy.  Small volume residual free fluid along the right pericolic gutter and pelvis.    Decrease in size of the ill-defined soft tissue collection within the right hemipelvis measuring 5.7 x 3.3 cm, previously 7.1 x 3.3 cm.  Finding likely represents resolving hematoma.    Multiple compression fractures within the lower thoracic and lumbar spine, unchanged.    Colonic diverticulosis without diverticulitis.    This report was flagged in Epic as abnormal.    Electronically signed by resident: Ronni " Love  Date:    09/23/2018  Time:    16:08    Electronically signed by: Erma Munroe  Date:    09/23/2018  Time:    16:46             Narrative:    EXAMINATION:  CT ABDOMEN PELVIS WITH CONTRAST    CLINICAL HISTORY:  recent appy; diffuse abd pain;    TECHNIQUE:  Low dose axial images, sagittal and coronal reformations were obtained from the lung bases to the pubic symphysis following the IV administration of  mL of Omnipaque 350 .  Oral contrast was not administered..    COMPARISON:  CT abdomen pelvis 09/10/2018, 08/31/2018, 08/25/2018    FINDINGS:  Exam mildly limited secondary to beam hardening artifact created by patient's arms in the scanner as well as a left hip prosthesis.    ABDOMEN:    - Heart base: Normal in size without pericardial effusion.    - Lung bases: Well aerated bilaterally without significant consolidation, pleural effusion, pneumothorax, or mass.    - Liver: Normal in size without focal abnormality.  The portal, splenic and superior mesenteric veins are patent.    - Gallbladder: Decompressed without significant abnormality.    - Bile Ducts: No evidence of intra or extra hepatic biliary ductal dilation.    - Spleen: Negative.    - Kidneys: Normal in size and location without hydronephrosis.  There is a stable the 1.3 cm hypodensity within the inferior pole of the right kidney likely representing a renal cyst.    - Adrenals: Bilateral adrenal glands unremarkable.    - Pancreas: No mass or peripancreatic fat stranding.    - Vascular: No abdominal aortic aneurysm.    - Abdominal wall:  Postsurgical changes about the right lower abdominal wall status post appendectomy.  No organized fluid collection or evidence of wound dehiscence.  Small fat containing ventral hernia.    No enlarged retroperitoneal or mesenteric lymph nodes.    PELVIS:    Limited evaluation through the pelvis secondary to beam hardening artifact from hip arthroplasty.  Interval decrease in size of the high attenuation collection within  the right pelvic sidewall measuring 5.7 x 3.3 cm, decreased from 7.1 x 3.3 cm.  Small volume of free fluid tracking along the right pericolic gutter and draining into the pelvis, similar to prior exam.  Bladder appears unremarkable accounting for limitations secondary to beam hardening artifact.    BOWEL/MESENTERY:    Improvement in inflammatory changes about the area of prior appendectomy with some residual mesenteric fat stranding remaining.  No evidence of high-grade bowel obstruction.  Fluid filled colon.  Scattered colonic diverticula.  No free fluid or free air.    BONES:  Postoperative change status post left hip arthroplasty and spinal stimulator placement.  Stable appearance of multiple compression fractures of within the lumbar and lower thoracic spine.  No new acute fracture or aggressive osseous lesion.                               X-Ray Chest AP Portable (Final result)  Result time 09/23/18 13:10:33    Final result by Miah De La Rosa MD (09/23/18 13:10:33)                 Impression:      1. No acute cardiopulmonary process, stable chronic findings.      Electronically signed by: Miah De La Rosa MD  Date:    09/23/2018  Time:    13:10             Narrative:    EXAMINATION:  XR CHEST AP PORTABLE    CLINICAL HISTORY:  Sepsis;    TECHNIQUE:  Single frontal view of the chest was performed.    COMPARISON:  09/12/2018    FINDINGS:  Right chest wall port catheter tip projects over the distal SVC, stable in configuration.  Stable stimulator device.  The cardiomediastinal silhouette is prominent, stable as compared to the previous exam..  There is no pleural effusion.  The trachea is midline.  The lungs are symmetrically expanded bilaterally without evidence of acute parenchymal process. No large focal consolidation seen.  There is mild left basilar subsegmental atelectasis.  There is no pneumothorax.  The osseous structures are remarkable for degenerative changes..                                 ASSESSMENT/PLAN:   62 y/o female admit for AMS s/p appendectomy.      Primary Diagnosis:  Altered mental status    Active Hospital Problems    Diagnosis  POA    *Altered mental status [R41.82]  Yes    Essential hypertension [I10]  Yes    Gastroesophageal reflux disease without esophagitis [K21.9]  Yes    Debilitated patient [R53.81]  Yes    S/P appendectomy [Z90.49]  Not Applicable    Moderate asthma without complication [J45.909]  Yes    (HFpEF) heart failure with preserved ejection fraction [I50.30]  Unknown    Clostridium difficile infection [B96.89]  Yes    NIKHIL (acute kidney injury) [N17.9]  Unknown    Insulin dependent diabetes mellitus [E11.9, Z79.4]  Not Applicable    CAD (coronary artery disease) [I25.10]  Yes    Closed compression fracture of fourth lumbar vertebra [S32.040A]  Yes      Resolved Hospital Problems   No resolved problems to display.     Altered mental status  Pt presents from SNF with AMS and abdomen exquisitely tender to palpation (although pt denies AMS and abdominal pain during review of systems). DDx include infection, dehydration, medication induced, & delirium.   - CT head showed no acute path  - Blood culture show no growth to date    Plan:  - Started Vanc and Zosyn  - Pt given IVFs: NS 30 ml/kg (2190 cc)  - Holding Cymbalta  - Avoid opioids for now  - Delirium precautions  - Decrease prednisone     S/P appendectomy  Pt recently hospitalized for appendicitis with peritonitis. She had open appendectomy 8/26 with wound cultures positive for MRSA, enterococcus, and E.col. She was discharged with PO vanc (stop Sept 13), clinda (1 week), and flagyl (stop Sept 13).   At SNF , she was on Ertapenem (started 9/20) and Vancomycin (started 9/21) until this transfered to ED for present admission  -Gen surg consulted: Recommend against aspirating RLQ fluid collection since patient is afebrile and without leukocytosis  -CT (09/23): R pelvic fluid collection, 5.7 cm in size,  decreased from 7.1 cm on 9/10    Plan:  - Broad spec abx coverage     Insulin dependent diabetes mellitus  - 7 U detemir BID  - LDSSI  - Diabetic diet      CAD (coronary artery disease)  - ASA 81  - Plavix 75  - Simvastatin 40       Closed compression fracture of fourth lumbar vertebra  Pt with chronic back pain. S/p spinal stimulator placement    - Avoiding opiates for now because of AMS  - Baclofen PRN for pain    Clostridium difficile infection  Recent hospitalization positive for C. Diff infection. Treated with PO vanc and flagyl.   Pt presently reporting loose, but not watery, stool        NIKHIL (acute kidney injury)    Cr 1.4 on admit. Baseline 0.8.      - Will give IVFs   - Monitor BMP     Moderate asthma without complication  - Breo daily  - Theophylline 200 mg daily  -  Albuterol PRN      (HFpEF) heart failure with preserved ejection fraction  ESSENTIAL HYPERTENSION    Last ECHO 8/23/18 showing EF 60-65%, grade 1 diastolic dysfunction     - Lasix 40 mg daily  - Losartan 100 mg daily  - Prazosin 5 mg BID    Debilitated patient  - PT/OT      Gastroesophageal reflux disease without esophagitis  - Pantoprazole 40 mg           VTE Risk Mitigation (From admission, onward)        Ordered     heparin, porcine (PF) 100 unit/mL injection flush 500 Units  As needed (PRN)      09/24/18 0145     heparin (porcine) injection 5,000 Units  Every 8 hours      09/23/18 2100            [place]ia [place]  Medical Student

## 2018-09-24 NOTE — SUBJECTIVE & OBJECTIVE
Past Medical History:   Diagnosis Date    Asthma     Closed compression fracture of fourth lumbar vertebra     COPD (chronic obstructive pulmonary disease)     Coronary artery disease     Diabetes mellitus     Glaucoma     High cholesterol     Hypertension     Iritis     Pulmonary embolus     Stroke     rt sided weakness.       Past Surgical History:   Procedure Laterality Date    ABDOMINAL SURGERY      APPENDECTOMY N/A 8/26/2018    Procedure: APPENDECTOMY;  Surgeon: Bernadine Melendrez MD;  Location: [facility] OR;  Service: General;  Laterality: N/A;    APPENDECTOMY N/A 8/26/2018    Performed by Bernadine Melendrez MD at [facility] OR    APPENDECTOMY, LAPAROSCOPIC---CONVERTED TO OPEN APPENDECTOMY @0950 N/A 8/26/2018    Performed by Bernadine Melendrez MD at [facility] OR    BACK SURGERY      stimulator    CATARACT EXTRACTION      HYSTERECTOMY      LAPAROSCOPIC APPENDECTOMY N/A 8/26/2018    Procedure: APPENDECTOMY, LAPAROSCOPIC---CONVERTED TO OPEN APPENDECTOMY @0950;  Surgeon: Bernadine Melendrez MD;  Location: [facility] OR;  Service: General;  Laterality: N/A;       Review of patient's allergies indicates:   Allergen Reactions    Ace inhibitors Swelling    Corticosteroids (glucocorticoids)     Hydralazine analogues     Tetracyclines Swelling    Travatan (with benzalkonium) [travoprost (benzalkonium)]        No current facility-administered medications on file prior to encounter.      Current Outpatient Medications on File Prior to Encounter   Medication Sig    aspirin (ECOTRIN) 81 MG EC tablet Take 1 tablet (81 mg total) by mouth once daily.    clopidogrel (PLAVIX) 75 mg tablet Take 75 mg by mouth once daily.    diltiaZEM (CARDIZEM CD) 180 MG 24 hr capsule Take 180 mg by mouth once daily.    DULoxetine (CYMBALTA) 60 MG capsule Take 60 mg by mouth once daily.    furosemide (LASIX) 40 MG tablet Take 40 mg by mouth once daily.     insulin detemir U-100 (LEVEMIR) 100 unit/mL injection Inject 10 Units into  the skin 2 (two) times daily.     losartan (COZAAR) 100 MG tablet Take 100 mg by mouth once daily.     ondansetron (ZOFRAN) 4 MG tablet Take 1 tablet (4 mg total) by mouth every 6 (six) hours as needed.    prazosin (MINIPRESS) 5 MG capsule Take 5 mg by mouth 2 (two) times daily.     predniSONE (DELTASONE) 10 MG tablet Take 10 mg by mouth 2 (two) times daily.     simvastatin (ZOCOR) 40 MG tablet Take 40 mg by mouth every evening.    theophylline (THEODUR) 200 MG 12 hr tablet Take 200 mg by mouth once daily.     [DISCONTINUED] albuterol (ACCUNEB) 0.63 mg/3 mL Nebu Take 0.83 mg by nebulization every 6 (six) hours as needed. Rescue     [DISCONTINUED] baclofen (LIORESAL) 10 MG tablet Take 10 mg by mouth 3 (three) times daily.    [DISCONTINUED] cloNIDine 0.2 mg/24 hr td ptwk (CATAPRES) 0.2 mg/24 hr Place 1 patch onto the skin every 7 days.    [DISCONTINUED] diclofenac sodium 1 % Gel Apply topically once daily. for 10 days as directed    [DISCONTINUED] fluticasone-salmeterol 500-50 mcg/dose (ADVAIR DISKUS) 500-50 mcg/dose DsDv diskus inhaler Inhale 1 puff into the lungs 2 (two) times daily. Controller    [DISCONTINUED] gabapentin (NEURONTIN) 300 MG capsule Take 300 mg by mouth 3 (three) times daily.    [DISCONTINUED] insulin lispro (HUMALOG PEN SUBQ) Inject into the skin.    [DISCONTINUED] lansoprazole (PREVACID) 30 MG capsule Take 30 mg by mouth once daily.    [DISCONTINUED] pyridoxine, vitamin B6, (VITAMIN B-6) 50 MG Tab Take 50 mg by mouth once daily.    [DISCONTINUED] sitagliptin phosphate (JANUVIA ORAL) Take by mouth.    [DISCONTINUED] theophylline anhydrous (UNIPHYL ORAL) Take by mouth.    [DISCONTINUED] traMADol (ULTRAM-ER) 100 MG Tb24 Take 50 mg by mouth daily as needed.     Family History     Problem Relation (Age of Onset)    Cancer Father    Diabetes Brother        Tobacco Use    Smoking status: Never Smoker   Substance and Sexual Activity    Alcohol use: No     Frequency: Never    Drug use:  No    Sexual activity: No     Partners: Male     Review of Systems   Constitutional: Positive for activity change and fatigue. Negative for appetite change, chills, diaphoresis and fever.   HENT: Positive for trouble swallowing (pills). Negative for facial swelling and sore throat.    Eyes: Negative for photophobia, pain and visual disturbance.   Respiratory: Negative for cough, chest tightness, shortness of breath and wheezing.    Cardiovascular: Negative for chest pain, palpitations and leg swelling.   Gastrointestinal: Positive for abdominal pain and diarrhea. Negative for blood in stool, constipation, nausea and vomiting.   Genitourinary: Negative for difficulty urinating, dysuria and flank pain.   Musculoskeletal: Positive for arthralgias, back pain and gait problem. Negative for neck pain and neck stiffness.   Skin: Positive for wound (Surgical scar, RLQ).   Neurological: Positive for dizziness and weakness. Negative for syncope, numbness and headaches.   Hematological: Bruises/bleeds easily.   Psychiatric/Behavioral: Positive for confusion. Negative for hallucinations. The patient is not nervous/anxious.      Objective:     Vital Signs (Most Recent):  Temp: 97.8 °F (36.6 °C) (09/23/18 0800)  Pulse: (!) 120 (09/23/18 0800)  Resp: (!) 24 (09/23/18 0800)  BP: (!) 140/98 (09/23/18 0800)  SpO2: 97 % (09/23/18 0800) Vital Signs (24h Range):  Temp:  [96.9 °F (36.1 °C)-97.8 °F (36.6 °C)] 97.7 °F (36.5 °C)  Pulse:  [] 94  Resp:  [18-24] 20  SpO2:  [94 %-100 %] 94 %  BP: (129-179)/() 161/108     Weight: (patient refused to get up to get weighed )  Body mass index is 30.58 kg/m².    Physical Exam   Constitutional: She is oriented to person, place, and time. She appears well-developed and well-nourished. No distress.   HENT:   Head: Normocephalic and atraumatic.   Mouth/Throat: Oropharynx is clear and moist.   Eyes: Conjunctivae and EOM are normal. Pupils are equal, round, and reactive to light. Right eye  exhibits no discharge. Left eye exhibits no discharge.   Neck: Normal range of motion. Neck supple.   Cardiovascular: Normal rate, regular rhythm, normal heart sounds and intact distal pulses.   Pulmonary/Chest: Effort normal and breath sounds normal. No respiratory distress. She has no wheezes. She has no rales. She exhibits no tenderness.   Abdominal: Soft. Bowel sounds are normal. There is generalized tenderness. There is no rebound and no guarding.       Musculoskeletal: She exhibits no edema, tenderness or deformity.   Lymphadenopathy:     She has no cervical adenopathy.   Neurological: She is alert and oriented to person, place, and time.   Intermittent periods of lethargy & confusion during exam   Skin: Skin is warm and dry. She is not diaphoretic.   Psychiatric: Her speech is rapid and/or pressured and slurred. She is not agitated and not combative. She is inattentive.         CRANIAL NERVES     CN III, IV, VI   Pupils are equal, round, and reactive to light.  Extraocular motions are normal.        Significant Labs:   A1C:   Recent Labs   Lab  08/23/18   0217   HGBA1C  8.1*     ABGs:   Recent Labs   Lab  09/23/18   1251   PH  7.517*   PCO2  43.2   HCO3  35.1*   POCSATURATED  90*   BE  12     Blood Culture:   Recent Labs   Lab  09/23/18   1250   LABBLOO  No Growth to date     CBC:   Recent Labs   Lab  09/22/18   1126  09/23/18   1250   WBC  7.98  9.13   HGB  11.1*  12.0   HCT  35.8*  39.3   PLT  208  228     CMP:   Recent Labs   Lab  09/22/18   1126  09/23/18   1250   NA  139  140   K  3.5  3.7   CL  101  102   CO2  30*  30*   GLU  108  163*   BUN  13  18   CREATININE  0.8  1.4   CALCIUM  8.8  9.4   PROT   --   6.4   ALBUMIN   --   2.9*   BILITOT   --   0.5   ALKPHOS   --   103   AST   --   26   ALT   --   24   ANIONGAP  8  8   EGFRNONAA  >60.0  40.0*     Lactic Acid:   Recent Labs   Lab  09/23/18   1250  09/23/18   1804   LACTATE  1.7  1.2       Significant Imaging: I have reviewed all pertinent imaging  results/findings within the past 24 hours.

## 2018-09-24 NOTE — NURSING
Pt HR spiked to 190 for approximately 3 seconds.  BP continues uncontrolled sbp 180s without relieve, despite 1x dose of coreg and augustin minipress hs. Service team aware and ordered for stat EKG. EKG revealed afib , which is a new onset per chart. Serivce team paged pending call pack.

## 2018-09-24 NOTE — SUBJECTIVE & OBJECTIVE
Interval History:  - Pt agitated throughout day  - Pt spiking SBP to 180s overnight, afebrile  - New onset non-RVR asymptomatic afib this morning   - 4x loose bm non-bloody    Past Medical History:   Diagnosis Date    Asthma     Closed compression fracture of fourth lumbar vertebra     COPD (chronic obstructive pulmonary disease)     Coronary artery disease     Diabetes mellitus     Glaucoma     High cholesterol     Hypertension     Iritis     Pulmonary embolus     Stroke     rt sided weakness.       Past Surgical History:   Procedure Laterality Date    ABDOMINAL SURGERY      APPENDECTOMY N/A 8/26/2018    Procedure: APPENDECTOMY;  Surgeon: Bernadine Melendrez MD;  Location: [facility] OR;  Service: General;  Laterality: N/A;    APPENDECTOMY N/A 8/26/2018    Performed by Bernadine Melendrez MD at [facility] OR    APPENDECTOMY, LAPAROSCOPIC---CONVERTED TO OPEN APPENDECTOMY @0950 N/A 8/26/2018    Performed by Bernadine Melendrez MD at [facility] OR    BACK SURGERY      stimulator    CATARACT EXTRACTION      HYSTERECTOMY      LAPAROSCOPIC APPENDECTOMY N/A 8/26/2018    Procedure: APPENDECTOMY, LAPAROSCOPIC---CONVERTED TO OPEN APPENDECTOMY @0950;  Surgeon: Bernadine Melendrez MD;  Location: [facility] OR;  Service: General;  Laterality: N/A;       Review of patient's allergies indicates:   Allergen Reactions    Ace inhibitors Swelling    Corticosteroids (glucocorticoids)     Hydralazine analogues     Tetracyclines Swelling    Travatan (with benzalkonium) [travoprost (benzalkonium)]        Current Facility-Administered Medications on File Prior to Encounter   Medication    [MAR Hold - Suspended Admission] acetaminophen tablet 650 mg    [MAR Hold - Suspended Admission] acetaminophen tablet 650 mg    [MAR Hold - Suspended Admission] albuterol-ipratropium 2.5 mg-0.5 mg/3 mL nebulizer solution 3 mL    [MAR Hold - Suspended Admission] aspirin EC tablet 81 mg    [MAR Hold - Suspended Admission] calcium carbonate 200 mg  calcium (500 mg) chewable tablet 500 mg    [MAR Hold - Suspended Admission] clopidogrel tablet 75 mg    [MAR Hold - Suspended Admission] dextrose 50% injection 12.5 g    [MAR Hold - Suspended Admission] dextrose 50% injection 25 g    [MAR Hold - Suspended Admission] diltiaZEM 24 hr capsule 180 mg    [MAR Hold - Suspended Admission] DULoxetine DR capsule 60 mg    [MAR Hold - Suspended Admission] enoxaparin injection 40 mg    [MAR Hold - Suspended Admission] ertapenem (INVANZ) 1 g in sodium chloride 0.9 % 100 mL IVPB (ready to mix system)    [MAR Hold - Suspended Admission] furosemide tablet 40 mg    [MAR Hold - Suspended Admission] glucagon (human recombinant) injection 1 mg    [MAR Hold - Suspended Admission] glucose chewable tablet 16 g    [MAR Hold - Suspended Admission] glucose chewable tablet 24 g    [MAR Hold - Suspended Admission] insulin aspart U-100 pen 0-5 Units    [MAR Hold - Suspended Admission] insulin detemir U-100 pen 10 Units    [MAR Hold - Suspended Admission] Lactobacillus rhamnosus GG capsule 1 capsule    [MAR Hold - Suspended Admission] losartan tablet 100 mg    [MAR Hold - Suspended Admission] ondansetron disintegrating tablet 8 mg    [MAR Hold - Suspended Admission] ondansetron tablet 4 mg    [MAR Hold - Suspended Admission] pantoprazole EC tablet 40 mg    [MAR Hold - Suspended Admission] prazosin capsule 5 mg    [MAR Hold - Suspended Admission] predniSONE tablet 10 mg    [MAR Hold - Suspended Admission] ramelteon tablet 8 mg    [MAR Hold - Suspended Admission] senna-docusate 8.6-50 mg per tablet 1 tablet    [MAR Hold - Suspended Admission] simethicone chewable tablet 160 mg    [MAR Hold - Suspended Admission] simvastatin tablet 40 mg    [MAR Hold - Suspended Admission] theophylline 24 hr capsule 200 mg    [MAR Hold - Suspended Admission] vancomycin 750 mg in dextrose 5 % 250 mL IVPB (ready to mix system)    [MAR Hold - Suspended Admission] zinc oxide 20 % ointment      Current Outpatient Medications on File Prior to Encounter   Medication Sig    albuterol-ipratropium (DUO-NEB) 2.5 mg-0.5 mg/3 mL nebulizer solution Take 3 mLs by nebulization every 4 (four) hours. Rescue    aspirin (ECOTRIN) 81 MG EC tablet Take 1 tablet (81 mg total) by mouth once daily.    clopidogrel (PLAVIX) 75 mg tablet Take 75 mg by mouth once daily.    diltiaZEM (CARDIZEM CD) 180 MG 24 hr capsule Take 180 mg by mouth once daily.    DULoxetine (CYMBALTA) 60 MG capsule Take 60 mg by mouth once daily.    enoxaparin (LOVENOX) 40 mg/0.4 mL Syrg Inject 40 mg into the skin once daily.    furosemide (LASIX) 40 MG tablet Take 40 mg by mouth once daily.     insulin aspart U-100 (NOVOLOG) 100 unit/mL injection Inject 0-5 Units into the skin 4 (four) times daily as needed for High Blood Sugar.    insulin detemir U-100 (LEVEMIR) 100 unit/mL injection Inject 10 Units into the skin 2 (two) times daily.     Lactobacillus acidophilus 1 billion cell Cap Take 1 tablet by mouth 2 (two) times daily.    losartan (COZAAR) 100 MG tablet Take 100 mg by mouth once daily.     megestrol (MEGACE) 40 MG Tab Take 40 mg by mouth 2 (two) times daily.    ondansetron (ZOFRAN) 4 MG tablet Take 1 tablet (4 mg total) by mouth every 6 (six) hours as needed.    pantoprazole (PROTONIX) 40 MG tablet Take 40 mg by mouth once daily.    prazosin (MINIPRESS) 5 MG capsule Take 5 mg by mouth 2 (two) times daily.     predniSONE (DELTASONE) 10 MG tablet Take 10 mg by mouth 2 (two) times daily.     senna-docusate 8.6-50 mg (SENNA WITH DOCUSATE SODIUM) 8.6-50 mg per tablet Take 1 tablet by mouth 2 (two) times daily.    simethicone 80 mg Tab Take 160 mg by mouth every 6 (six) hours as needed for Flatulence.    simvastatin (ZOCOR) 40 MG tablet Take 40 mg by mouth every evening.    theophylline (THEODUR) 200 MG 12 hr tablet Take 200 mg by mouth once daily.      Family History     Problem Relation (Age of Onset)    Cancer Father    Diabetes  Brother        Tobacco Use    Smoking status: Never Smoker   Substance and Sexual Activity    Alcohol use: No     Frequency: Never    Drug use: No    Sexual activity: No     Partners: Male     Review of Systems   Constitutional: Positive for activity change and fatigue. Negative for appetite change, chills, diaphoresis and fever.   HENT: Positive for trouble swallowing (pills). Negative for facial swelling and sore throat.    Eyes: Negative for photophobia, pain and visual disturbance.   Respiratory: Negative for cough, chest tightness, shortness of breath and wheezing.    Cardiovascular: Negative for chest pain, palpitations and leg swelling.   Gastrointestinal: Positive for abdominal pain and diarrhea. Negative for blood in stool, constipation, nausea and vomiting.   Genitourinary: Negative for difficulty urinating, dysuria and flank pain.   Musculoskeletal: Positive for arthralgias, back pain and gait problem. Negative for neck pain and neck stiffness.   Skin: Positive for wound (Surgical scar, RLQ).   Neurological: Positive for dizziness and weakness. Negative for syncope, numbness and headaches.   Hematological: Bruises/bleeds easily.   Psychiatric/Behavioral: Positive for confusion. Negative for hallucinations. The patient is not nervous/anxious.      Objective:     Vital Signs (Most Recent):  Temp: 97.6 °F (36.4 °C) (09/24/18 1605)  Pulse: 84 (09/24/18 1605)  Resp: (!) 29 (09/24/18 1605)  BP: (!) 161/82 (09/24/18 1605)  SpO2: 100 % (09/24/18 1605) Vital Signs (24h Range):  Temp:  [96.2 °F (35.7 °C)-97.6 °F (36.4 °C)] 97.6 °F (36.4 °C)  Pulse:  [73-94] 84  Resp:  [22-29] 29  SpO2:  [90 %-100 %] 100 %  BP: (161-187)/() 161/82     Weight: 74.6 kg (164 lb 6.4 oz)  Body mass index is 32.11 kg/m².    Physical Exam   Constitutional: She is oriented to person, place, and time. She appears well-developed and well-nourished. No distress.   HENT:   Head: Normocephalic and atraumatic.   Mouth/Throat: Oropharynx  is clear and moist.   Eyes: Conjunctivae are normal. No scleral icterus.   Neck: Normal range of motion. Neck supple.   Cardiovascular: Normal rate, regular rhythm, normal heart sounds and intact distal pulses.   Pulmonary/Chest: Effort normal and breath sounds normal. No respiratory distress. She has no wheezes. She has no rales. She exhibits no tenderness.   Abdominal: Soft. Bowel sounds are normal. There is generalized tenderness. There is no rebound and no guarding.       Musculoskeletal: She exhibits no edema, tenderness or deformity.   Spinal cord stimulator in place   Lymphadenopathy:     She has no cervical adenopathy.   Neurological: She is alert and oriented to person, place, and time.   Intermittent periods of lethargy & confusion during exam   Skin: Skin is warm and dry. She is not diaphoretic.   Psychiatric: Her speech is rapid and/or pressured and slurred. She is not agitated and not combative. She is inattentive.           Significant Labs:   A1C:   Recent Labs   Lab  08/23/18   0217   HGBA1C  8.1*     ABGs:   Recent Labs   Lab  09/23/18   1251   PH  7.517*   PCO2  43.2   HCO3  35.1*   POCSATURATED  90*   BE  12     Blood Culture:   Recent Labs   Lab  09/23/18   1250  09/23/18   1407   LABBLOO  No Growth to date  No Growth to date  No Growth to date  No Growth to date     CBC:   Recent Labs   Lab  09/23/18   1250   WBC  9.13   HGB  12.0   HCT  39.3   PLT  228     CMP:   Recent Labs   Lab  09/23/18   1250  09/24/18   0722   NA  140  140   K  3.7  3.7   CL  102  108   CO2  30*  26   GLU  163*  109   BUN  18  12   CREATININE  1.4  0.9   CALCIUM  9.4  8.6*   PROT  6.4  5.8*   ALBUMIN  2.9*  2.6*   BILITOT  0.5  0.5   ALKPHOS  103  82   AST  26  19   ALT  24  19   ANIONGAP  8  6*   EGFRNONAA  40.0*  >60.0     Lactic Acid:   Recent Labs   Lab  09/23/18   1250  09/23/18   1804   LACTATE  1.7  1.2       Significant Imaging: I have reviewed all pertinent imaging results/findings within the past 24 hours.

## 2018-09-24 NOTE — SUBJECTIVE & OBJECTIVE
Past Medical History:   Diagnosis Date    Asthma     Closed compression fracture of fourth lumbar vertebra     COPD (chronic obstructive pulmonary disease)     Coronary artery disease     Diabetes mellitus     Glaucoma     High cholesterol     Hypertension     Iritis     Pulmonary embolus     Stroke     rt sided weakness.       Past Surgical History:   Procedure Laterality Date    ABDOMINAL SURGERY      APPENDECTOMY N/A 8/26/2018    Procedure: APPENDECTOMY;  Surgeon: Bernadine Melendrez MD;  Location: [facility] OR;  Service: General;  Laterality: N/A;    APPENDECTOMY N/A 8/26/2018    Performed by Bernadine Melendrez MD at [facility] OR    APPENDECTOMY, LAPAROSCOPIC---CONVERTED TO OPEN APPENDECTOMY @0950 N/A 8/26/2018    Performed by [facility]ine Melendrez MD at [facility] OR    BACK SURGERY      stimulator    CATARACT EXTRACTION      HYSTERECTOMY      LAPAROSCOPIC APPENDECTOMY N/A 8/26/2018    Procedure: APPENDECTOMY, LAPAROSCOPIC---CONVERTED TO OPEN APPENDECTOMY @0950;  Surgeon: Bernadine Melendrez MD;  Location: [facility] OR;  Service: General;  Laterality: N/A;       Review of patient's allergies indicates:   Allergen Reactions    Ace inhibitors Swelling    Corticosteroids (glucocorticoids)     Hydralazine analogues     Tetracyclines Swelling    Travatan (with benzalkonium) [travoprost (benzalkonium)]        Current Facility-Administered Medications on File Prior to Encounter   Medication    [MAR Hold - Suspended Admission] acetaminophen tablet 650 mg    [MAR Hold - Suspended Admission] acetaminophen tablet 650 mg    [MAR Hold - Suspended Admission] albuterol-ipratropium 2.5 mg-0.5 mg/3 mL nebulizer solution 3 mL    [MAR Hold - Suspended Admission] aspirin EC tablet 81 mg    [MAR Hold - Suspended Admission] calcium carbonate 200 mg calcium (500 mg) chewable tablet 500 mg    [MAR Hold - Suspended Admission] clopidogrel tablet 75 mg    [MAR Hold - Suspended Admission] dextrose 50% injection 12.5 g     [MAR Hold - Suspended Admission] dextrose 50% injection 25 g    [MAR Hold - Suspended Admission] diltiaZEM 24 hr capsule 180 mg    [MAR Hold - Suspended Admission] DULoxetine DR capsule 60 mg    [MAR Hold - Suspended Admission] enoxaparin injection 40 mg    [MAR Hold - Suspended Admission] ertapenem (INVANZ) 1 g in sodium chloride 0.9 % 100 mL IVPB (ready to mix system)    [MAR Hold - Suspended Admission] furosemide tablet 40 mg    [MAR Hold - Suspended Admission] glucagon (human recombinant) injection 1 mg    [MAR Hold - Suspended Admission] glucose chewable tablet 16 g    [MAR Hold - Suspended Admission] glucose chewable tablet 24 g    [MAR Hold - Suspended Admission] insulin aspart U-100 pen 0-5 Units    [MAR Hold - Suspended Admission] insulin detemir U-100 pen 10 Units    [MAR Hold - Suspended Admission] Lactobacillus rhamnosus GG capsule 1 capsule    [MAR Hold - Suspended Admission] losartan tablet 100 mg    [MAR Hold - Suspended Admission] ondansetron disintegrating tablet 8 mg    [MAR Hold - Suspended Admission] ondansetron tablet 4 mg    [MAR Hold - Suspended Admission] pantoprazole EC tablet 40 mg    [MAR Hold - Suspended Admission] prazosin capsule 5 mg    [MAR Hold - Suspended Admission] predniSONE tablet 10 mg    [MAR Hold - Suspended Admission] ramelteon tablet 8 mg    [MAR Hold - Suspended Admission] senna-docusate 8.6-50 mg per tablet 1 tablet    [MAR Hold - Suspended Admission] simethicone chewable tablet 160 mg    [MAR Hold - Suspended Admission] simvastatin tablet 40 mg    [MAR Hold - Suspended Admission] theophylline 24 hr capsule 200 mg    [MAR Hold - Suspended Admission] vancomycin 750 mg in dextrose 5 % 250 mL IVPB (ready to mix system)    [MAR Hold - Suspended Admission] zinc oxide 20 % ointment     Current Outpatient Medications on File Prior to Encounter   Medication Sig    albuterol-ipratropium (DUO-NEB) 2.5 mg-0.5 mg/3 mL nebulizer solution Take 3 mLs by  nebulization every 4 (four) hours. Rescue    aspirin (ECOTRIN) 81 MG EC tablet Take 1 tablet (81 mg total) by mouth once daily.    clopidogrel (PLAVIX) 75 mg tablet Take 75 mg by mouth once daily.    diltiaZEM (CARDIZEM CD) 180 MG 24 hr capsule Take 180 mg by mouth once daily.    DULoxetine (CYMBALTA) 60 MG capsule Take 60 mg by mouth once daily.    enoxaparin (LOVENOX) 40 mg/0.4 mL Syrg Inject 40 mg into the skin once daily.    furosemide (LASIX) 40 MG tablet Take 40 mg by mouth once daily.     insulin aspart U-100 (NOVOLOG) 100 unit/mL injection Inject 0-5 Units into the skin 4 (four) times daily as needed for High Blood Sugar.    insulin detemir U-100 (LEVEMIR) 100 unit/mL injection Inject 10 Units into the skin 2 (two) times daily.     Lactobacillus acidophilus 1 billion cell Cap Take 1 tablet by mouth 2 (two) times daily.    losartan (COZAAR) 100 MG tablet Take 100 mg by mouth once daily.     megestrol (MEGACE) 40 MG Tab Take 40 mg by mouth 2 (two) times daily.    ondansetron (ZOFRAN) 4 MG tablet Take 1 tablet (4 mg total) by mouth every 6 (six) hours as needed.    pantoprazole (PROTONIX) 40 MG tablet Take 40 mg by mouth once daily.    prazosin (MINIPRESS) 5 MG capsule Take 5 mg by mouth 2 (two) times daily.     predniSONE (DELTASONE) 10 MG tablet Take 10 mg by mouth 2 (two) times daily.     senna-docusate 8.6-50 mg (SENNA WITH DOCUSATE SODIUM) 8.6-50 mg per tablet Take 1 tablet by mouth 2 (two) times daily.    simethicone 80 mg Tab Take 160 mg by mouth every 6 (six) hours as needed for Flatulence.    simvastatin (ZOCOR) 40 MG tablet Take 40 mg by mouth every evening.    theophylline (THEODUR) 200 MG 12 hr tablet Take 200 mg by mouth once daily.     [DISCONTINUED] albuterol (ACCUNEB) 0.63 mg/3 mL Nebu Take 0.83 mg by nebulization every 6 (six) hours as needed. Rescue     [DISCONTINUED] baclofen (LIORESAL) 10 MG tablet Take 10 mg by mouth 3 (three) times daily.    [DISCONTINUED] cloNIDine  0.2 mg/24 hr td ptwk (CATAPRES) 0.2 mg/24 hr Place 1 patch onto the skin every 7 days.    [DISCONTINUED] diclofenac sodium 1 % Gel Apply topically once daily. for 10 days as directed    [DISCONTINUED] fluticasone-salmeterol 500-50 mcg/dose (ADVAIR DISKUS) 500-50 mcg/dose DsDv diskus inhaler Inhale 1 puff into the lungs 2 (two) times daily. Controller    [DISCONTINUED] gabapentin (NEURONTIN) 300 MG capsule Take 300 mg by mouth 3 (three) times daily.    [DISCONTINUED] insulin lispro (HUMALOG PEN SUBQ) Inject into the skin.    [DISCONTINUED] lansoprazole (PREVACID) 30 MG capsule Take 30 mg by mouth once daily.    [DISCONTINUED] pyridoxine, vitamin B6, (VITAMIN B-6) 50 MG Tab Take 50 mg by mouth once daily.    [DISCONTINUED] sitagliptin phosphate (JANUVIA ORAL) Take by mouth.    [DISCONTINUED] theophylline anhydrous (UNIPHYL ORAL) Take by mouth.    [DISCONTINUED] traMADol (ULTRAM-ER) 100 MG Tb24 Take 50 mg by mouth daily as needed.     Family History     Problem Relation (Age of Onset)    Cancer Father    Diabetes Brother        Tobacco Use    Smoking status: Never Smoker   Substance and Sexual Activity    Alcohol use: No     Frequency: Never    Drug use: No    Sexual activity: No     Partners: Male     Review of Systems   Constitutional: Positive for activity change and fatigue. Negative for appetite change, chills, diaphoresis and fever.   HENT: Positive for trouble swallowing (pills). Negative for facial swelling and sore throat.    Eyes: Negative for photophobia, pain and visual disturbance.   Respiratory: Negative for cough, chest tightness, shortness of breath and wheezing.    Cardiovascular: Negative for chest pain, palpitations and leg swelling.   Gastrointestinal: Positive for abdominal pain and diarrhea. Negative for blood in stool, constipation, nausea and vomiting.   Genitourinary: Negative for difficulty urinating, dysuria and flank pain.   Musculoskeletal: Positive for arthralgias, back pain  and gait problem. Negative for neck pain and neck stiffness.   Skin: Positive for wound (Surgical scar, RLQ).   Neurological: Positive for dizziness and weakness. Negative for syncope, numbness and headaches.   Hematological: Bruises/bleeds easily.   Psychiatric/Behavioral: Positive for confusion. Negative for hallucinations. The patient is not nervous/anxious.      Objective:     Vital Signs (Most Recent):  Temp: 97.2 °F (36.2 °C) (09/23/18 1909)  Pulse: 84 (09/23/18 1909)  Resp: (!) 24 (09/23/18 1909)  BP: (!) 183/89 (09/23/18 1909)  SpO2: 98 % (09/23/18 1909) Vital Signs (24h Range):  Temp:  [96.9 °F (36.1 °C)-97.8 °F (36.6 °C)] 97.2 °F (36.2 °C)  Pulse:  [] 84  Resp:  [18-24] 24  SpO2:  [94 %-100 %] 98 %  BP: (129-183)/() 183/89     Weight: 73 kg (161 lb)  Body mass index is 31.44 kg/m².    Physical Exam   Constitutional: She is oriented to person, place, and time. She appears well-developed and well-nourished. No distress.   HENT:   Head: Normocephalic and atraumatic.   Mouth/Throat: Oropharynx is clear and moist.   Eyes: Conjunctivae and EOM are normal. Pupils are equal, round, and reactive to light. Right eye exhibits no discharge. Left eye exhibits no discharge.   Neck: Normal range of motion. Neck supple.   Cardiovascular: Normal rate, regular rhythm, normal heart sounds and intact distal pulses.   Pulmonary/Chest: Effort normal and breath sounds normal. No respiratory distress. She has no wheezes. She has no rales. She exhibits no tenderness.   Abdominal: Soft. Bowel sounds are normal. There is generalized tenderness. There is no rebound and no guarding.       Musculoskeletal: She exhibits no edema, tenderness or deformity.   Spinal cord stimulator in place   Lymphadenopathy:     She has no cervical adenopathy.   Neurological: She is alert and oriented to person, place, and time.   Intermittent periods of lethargy & confusion during exam   Skin: Skin is warm and dry. She is not diaphoretic.    Psychiatric: Her speech is rapid and/or pressured and slurred. She is not agitated and not combative. She is inattentive.         CRANIAL NERVES     CN III, IV, VI   Pupils are equal, round, and reactive to light.  Extraocular motions are normal.        Significant Labs:   A1C:   Recent Labs   Lab  08/23/18   0217   HGBA1C  8.1*     ABGs:   Recent Labs   Lab  09/23/18   1251   PH  7.517*   PCO2  43.2   HCO3  35.1*   POCSATURATED  90*   BE  12     Blood Culture:   Recent Labs   Lab  09/23/18   1250   LABBLOO  No Growth to date     CBC:   Recent Labs   Lab  09/22/18   1126  09/23/18   1250   WBC  7.98  9.13   HGB  11.1*  12.0   HCT  35.8*  39.3   PLT  208  228     CMP:   Recent Labs   Lab  09/22/18   1126  09/23/18   1250   NA  139  140   K  3.5  3.7   CL  101  102   CO2  30*  30*   GLU  108  163*   BUN  13  18   CREATININE  0.8  1.4   CALCIUM  8.8  9.4   PROT   --   6.4   ALBUMIN   --   2.9*   BILITOT   --   0.5   ALKPHOS   --   103   AST   --   26   ALT   --   24   ANIONGAP  8  8   EGFRNONAA  >60.0  40.0*     Lactic Acid:   Recent Labs   Lab  09/23/18   1250  09/23/18   1804   LACTATE  1.7  1.2       Significant Imaging: I have reviewed all pertinent imaging results/findings within the past 24 hours.

## 2018-09-24 NOTE — PT/OT/SLP EVAL
Occupational Therapy   Evaluation    Name: Sheryl Martines  MRN: [id number]  Admitting Diagnosis:  Altered mental status      Recommendations:     Discharge Recommendations: nursing facility, skilled  Discharge Equipment Recommendations:  TBD-- pending progress  Barriers to discharge:  Inaccessible home environment, Decreased caregiver support    History:     Occupational Profile:  Living Environment: Pt [place] with d[place] with theshold to enter; tub-shower with shower chair  Previous level of function: Pt. Reportedly indep-Andrei ADLs and sponge bathes; ambulated Andrei with rollator; daughter provides transportaion; pt transferred from SNF upon admit   Per previous SNF OT progress note: Pt requires increased time and frequent rest breaks throughout tx to complete tasks secondary to decreased endurance, weakness and SOB. Pt transferring to chair with CGA-Min A using RW.    Assistance upon Discharge: limited by daughter 2/2 works during the day.   Equipment Used at Home:  shower chair, bedside commode, rollator      Past Medical History:   Diagnosis Date    Asthma     Closed compression fracture of fourth lumbar vertebra     COPD (chronic obstructive pulmonary disease)     Coronary artery disease     Diabetes mellitus     Glaucoma     High cholesterol     Hypertension     Iritis     Pulmonary embolus     Stroke     rt sided weakness.       Past Surgical History:   Procedure Laterality Date    ABDOMINAL SURGERY      APPENDECTOMY N/A 8/26/2018    Procedure: APPENDECTOMY;  Surgeon: Bernadine Melendrez MD;  Location: [facility] OR;  Service: General;  Laterality: N/A;    APPENDECTOMY N/A 8/26/2018    Performed by Bernadine Melendrez MD at [facility] OR    APPENDECTOMY, LAPAROSCOPIC---CONVERTED TO OPEN APPENDECTOMY @0950 N/A 8/26/2018    Performed by Bernadine Melendrez MD at [facility] OR    BACK SURGERY      stimulator    CATARACT EXTRACTION      HYSTERECTOMY      LAPAROSCOPIC APPENDECTOMY N/A 8/26/2018    Procedure:  APPENDECTOMY, LAPAROSCOPIC---CONVERTED TO OPEN APPENDECTOMY @0950;  Surgeon: Bernadine Melendrez MD;  Location: [facility] OR;  Service: General;  Laterality: N/A;       Subjective     *Difficulty understanding patient 2/2 slurred/mumbled speech      Pain/Comfort:  · Pain Rating 1: (back pain-- did not rate)    Patients cultural, spiritual, L[employer] conflicts given the current situation:  None stated    Objective:     Communicated with: RN prior to session.  Patient found all lines intact, call button in reach and RN notified and telemetry, pulse ox (continuous), blood pressure cuff upon OT entry to room.    General Precautions: Standard, contact, fall   Orthopedic Precautions:N/A   Braces: N/A     Occupational Performance:    Bed Mobility:    · Patient completed Rolling/Turning to Right with minimum assistance  · Patient completed Supine to Sit with moderate assistance  · Patient completed Sit to Supine with moderate assistance    Sitting EOB:   Pt demo posterior lean while seated EOB ( unable to reach full sitting position 2/2 back pain )-- daughter reports this is base line 2/2 spinal stenosis and fractures.     Functional Mobility/Transfers:  · NT, 2/2 malcolm BP ( 181/86)  and pt becoming nauseas upon sitting EOB -- RN notified     Activities of Daily Living:  · Upper Body Dressing: maximal assistance to tre gown like jacket while seated EOB  · Lower Body Dressing: total assistance to tre B socks while seated EOB    Cognitive/Visual Perceptual:  Cognitive/Psychosocial Skills:     -       Oriented to: Person , pt able to state month   -       Follows Commands/attention:Inattentive, Easily distracted and Follows 50% one-step commands  -       Communication: slurred, mumbled   -       Memory: Unable to fully access   -       Safety awareness/insight to disability: impaired   -       Mood/Affect/Coping skills/emotional control: Waxes and wanes, confusion  Visual/Perceptual:      -eyes closed 90% of session  "    Physical Exam:  Postural examination/scapula alignment:    -       Rounded shoulders  -       Forward head  Skin integrity: Visible skin intact  Edema:  None noted  Dominant hand:    -       RUE  Upper Extremity Range of Motion:     -       Right Upper Extremity: WFL   -       Left Upper Extremity:WFL   Upper Extremity Strength:    -       Right Upper Extremity: WFL 4+/5  -       Left Upper Extremity: WFL  4+/5   Strength:    -       Right Upper Extremity: WFL   -       Left Upper Extremity: WFL     AMPAC 6 Click ADL:  AMPAC Total Score: 11    Treatment & Education:  -Pt edu on OT role/POC  -Safety during functional t/f and mobility  - White board updated  - Multiple self care tasks completed--as noted above    Education:    Patient left HOB elevated with all lines intact, call button in reach and RN notified    Assessment:     Sheryl Martines is a 63 y.o. female with a medical diagnosis of Altered mental status.  She presents with the following performance deficits affecting function: weakness, impaired endurance, impaired self care skills, impaired functional mobilty, impaired balance, gait instability, decreased safety awareness, impaired cognition, pain.      Rehab Prognosis: Good; patient would benefit from acute skilled OT services to address these deficits and reach maximum level of function.         Clinical Decision Makin.  OT Mod:  "Pt evaluation falls under moderate complexity for evaluation coding due to identification of 3-5 performance deficits noted as stated above. Eval required Min/Mod assistance to complete on this date and detailed assessment(s) were utilized. Moreover, an expanded review of history and occupational profile obtained with additional review of cognitive, physical and psychosocial hx."     Plan:     Patient to be seen 4 x/week to address the above listed problems via self-care/home management, therapeutic activities, therapeutic exercises, neuromuscular " re-education  · Plan of Care Expires: 10/23/18  · Plan of Care Reviewed with: patient, daughter    This Plan of care has been discussed with the patient who was involved in its development and understands and is in agreement with the identified goals and treatment plan    GOALS:   Multidisciplinary Problems     Occupational Therapy Goals        Problem: Occupational Therapy Goal    Goal Priority Disciplines Outcome Interventions   Occupational Therapy Goal     OT, PT/OT Ongoing (interventions implemented as appropriate)    Description:  Goals to be met by: 10/9/18    Patient will increase functional independence with ADLs by performing:    Feeding with Minimal Assistance.  UE Dressing with Minimal Assistance.  LE Dressing with Maximum assistance.  Grooming while seated at sink with Minimal Assistance.  Toileting from bedside commode with Moderate Assistance for hygiene and clothing management.   Supine to sit with Minimal Assistance.  Toilet transfer to toilet with Moderate Assistance.                      Time Tracking:     OT Date of Treatment: 09/24/18  OT Start Time: 0854  OT Stop Time: 0917  OT Total Time (min): 23 min    Billable Minutes:Evaluation 23    Rosette goldman OT  9/24/2018

## 2018-09-25 LAB
ALBUMIN SERPL BCP-MCNC: 2.7 G/DL
ALP SERPL-CCNC: 90 U/L
ALT SERPL W/O P-5'-P-CCNC: 22 U/L
ANION GAP SERPL CALC-SCNC: 11 MMOL/L
AST SERPL-CCNC: 28 U/L
BILIRUB SERPL-MCNC: 0.5 MG/DL
BUN SERPL-MCNC: 12 MG/DL
CALCIUM SERPL-MCNC: 9.3 MG/DL
CHLORIDE SERPL-SCNC: 108 MMOL/L
CO2 SERPL-SCNC: 24 MMOL/L
CREAT SERPL-MCNC: 1 MG/DL
EST. GFR  (AFRICAN AMERICAN): >60 ML/MIN/1.73 M^2
EST. GFR  (NON AFRICAN AMERICAN): >60 ML/MIN/1.73 M^2
GLUCOSE SERPL-MCNC: 129 MG/DL
MAGNESIUM SERPL-MCNC: 2.5 MG/DL
PHOSPHATE SERPL-MCNC: 3.7 MG/DL
POCT GLUCOSE: 105 MG/DL (ref 70–110)
POCT GLUCOSE: 108 MG/DL (ref 70–110)
POCT GLUCOSE: 118 MG/DL (ref 70–110)
POCT GLUCOSE: 95 MG/DL (ref 70–110)
POTASSIUM SERPL-SCNC: 4.4 MMOL/L
PROT SERPL-MCNC: 6.2 G/DL
SODIUM SERPL-SCNC: 143 MMOL/L
THEOPHYLLINE SERPL-MCNC: 2.8 UG/ML

## 2018-09-25 PROCEDURE — 80053 COMPREHEN METABOLIC PANEL: CPT

## 2018-09-25 PROCEDURE — 84100 ASSAY OF PHOSPHORUS: CPT

## 2018-09-25 PROCEDURE — 25000003 PHARM REV CODE 250: Performed by: STUDENT IN AN ORGANIZED HEALTH CARE EDUCATION/TRAINING PROGRAM

## 2018-09-25 PROCEDURE — 80198 ASSAY OF THEOPHYLLINE: CPT

## 2018-09-25 PROCEDURE — 97530 THERAPEUTIC ACTIVITIES: CPT

## 2018-09-25 PROCEDURE — 92526 ORAL FUNCTION THERAPY: CPT

## 2018-09-25 PROCEDURE — 63600175 PHARM REV CODE 636 W HCPCS: Performed by: STUDENT IN AN ORGANIZED HEALTH CARE EDUCATION/TRAINING PROGRAM

## 2018-09-25 PROCEDURE — 36415 COLL VENOUS BLD VENIPUNCTURE: CPT

## 2018-09-25 PROCEDURE — 18500000 HC LEAVE OF ABSENCE HOSPITAL SERVICES

## 2018-09-25 PROCEDURE — 63700000 PHARM REV CODE 250 ALT 637 W/O HCPCS: Performed by: STUDENT IN AN ORGANIZED HEALTH CARE EDUCATION/TRAINING PROGRAM

## 2018-09-25 PROCEDURE — 94640 AIRWAY INHALATION TREATMENT: CPT

## 2018-09-25 PROCEDURE — 11000001 HC ACUTE MED/SURG PRIVATE ROOM

## 2018-09-25 PROCEDURE — 99233 SBSQ HOSP IP/OBS HIGH 50: CPT | Mod: ,,, | Performed by: HOSPITALIST

## 2018-09-25 PROCEDURE — 27000221 HC OXYGEN, UP TO 24 HOURS

## 2018-09-25 PROCEDURE — 25000242 PHARM REV CODE 250 ALT 637 W/ HCPCS: Performed by: STUDENT IN AN ORGANIZED HEALTH CARE EDUCATION/TRAINING PROGRAM

## 2018-09-25 PROCEDURE — 83735 ASSAY OF MAGNESIUM: CPT

## 2018-09-25 PROCEDURE — 94761 N-INVAS EAR/PLS OXIMETRY MLT: CPT

## 2018-09-25 RX ORDER — SODIUM CHLORIDE 9 MG/ML
INJECTION, SOLUTION INTRAVENOUS CONTINUOUS
Status: DISCONTINUED | OUTPATIENT
Start: 2018-09-25 | End: 2018-09-26

## 2018-09-25 RX ORDER — DILTIAZEM HYDROCHLORIDE 120 MG/1
240 CAPSULE, COATED, EXTENDED RELEASE ORAL DAILY
Status: DISCONTINUED | OUTPATIENT
Start: 2018-09-26 | End: 2018-10-02 | Stop reason: HOSPADM

## 2018-09-25 RX ORDER — ACETAMINOPHEN 500 MG
1000 TABLET ORAL EVERY 6 HOURS PRN
Status: DISCONTINUED | OUTPATIENT
Start: 2018-09-25 | End: 2018-10-02 | Stop reason: HOSPADM

## 2018-09-25 RX ORDER — IPRATROPIUM BROMIDE AND ALBUTEROL SULFATE 2.5; .5 MG/3ML; MG/3ML
3 SOLUTION RESPIRATORY (INHALATION)
Status: DISCONTINUED | OUTPATIENT
Start: 2018-09-25 | End: 2018-09-25

## 2018-09-25 RX ORDER — BACLOFEN 5 MG/1
5 TABLET ORAL 2 TIMES DAILY PRN
Status: DISCONTINUED | OUTPATIENT
Start: 2018-09-25 | End: 2018-09-26

## 2018-09-25 RX ORDER — IPRATROPIUM BROMIDE AND ALBUTEROL SULFATE 2.5; .5 MG/3ML; MG/3ML
3 SOLUTION RESPIRATORY (INHALATION) EVERY 4 HOURS
Status: COMPLETED | OUTPATIENT
Start: 2018-09-25 | End: 2018-09-26

## 2018-09-25 RX ORDER — GABAPENTIN 300 MG/1
300 CAPSULE ORAL 3 TIMES DAILY
Status: DISCONTINUED | OUTPATIENT
Start: 2018-09-25 | End: 2018-09-26

## 2018-09-25 RX ORDER — ALBUTEROL SULFATE 90 UG/1
1 AEROSOL, METERED RESPIRATORY (INHALATION) 4 TIMES DAILY
Status: DISCONTINUED | OUTPATIENT
Start: 2018-09-25 | End: 2018-09-25

## 2018-09-25 RX ORDER — BACLOFEN 5 MG/1
2.5 TABLET ORAL 2 TIMES DAILY PRN
Status: DISCONTINUED | OUTPATIENT
Start: 2018-09-25 | End: 2018-09-25

## 2018-09-25 RX ADMIN — IPRATROPIUM BROMIDE AND ALBUTEROL SULFATE 3 ML: .5; 3 SOLUTION RESPIRATORY (INHALATION) at 03:09

## 2018-09-25 RX ADMIN — ASPIRIN 81 MG: 81 TABLET, COATED ORAL at 09:09

## 2018-09-25 RX ADMIN — PRAZOSIN HYDROCHLORIDE 5 MG: 5 CAPSULE ORAL at 10:09

## 2018-09-25 RX ADMIN — FUROSEMIDE 40 MG: 40 TABLET ORAL at 09:09

## 2018-09-25 RX ADMIN — CLOPIDOGREL 75 MG: 75 TABLET, FILM COATED ORAL at 09:09

## 2018-09-25 RX ADMIN — PREDNISONE 10 MG: 10 TABLET ORAL at 09:09

## 2018-09-25 RX ADMIN — PIPERACILLIN SODIUM AND TAZOBACTAM SODIUM 4.5 G: 4; .5 INJECTION, POWDER, LYOPHILIZED, FOR SOLUTION INTRAVENOUS at 12:09

## 2018-09-25 RX ADMIN — PRAZOSIN HYDROCHLORIDE 5 MG: 5 CAPSULE ORAL at 09:09

## 2018-09-25 RX ADMIN — VANCOMYCIN HYDROCHLORIDE 1000 MG: 1 INJECTION, POWDER, LYOPHILIZED, FOR SOLUTION INTRAVENOUS at 10:09

## 2018-09-25 RX ADMIN — SODIUM CHLORIDE: 0.9 INJECTION, SOLUTION INTRAVENOUS at 08:09

## 2018-09-25 RX ADMIN — DILTIAZEM HYDROCHLORIDE 180 MG: 180 CAPSULE, COATED, EXTENDED RELEASE ORAL at 09:09

## 2018-09-25 RX ADMIN — IPRATROPIUM BROMIDE AND ALBUTEROL SULFATE 3 ML: .5; 3 SOLUTION RESPIRATORY (INHALATION) at 07:09

## 2018-09-25 RX ADMIN — PIPERACILLIN SODIUM AND TAZOBACTAM SODIUM 4.5 G: 4; .5 INJECTION, POWDER, LYOPHILIZED, FOR SOLUTION INTRAVENOUS at 05:09

## 2018-09-25 RX ADMIN — GABAPENTIN 300 MG: 300 CAPSULE ORAL at 10:09

## 2018-09-25 RX ADMIN — PYRIDOXINE HCL TAB 50 MG 50 MG: 50 TAB at 09:09

## 2018-09-25 RX ADMIN — HEPARIN SODIUM 5000 UNITS: 5000 INJECTION, SOLUTION INTRAVENOUS; SUBCUTANEOUS at 05:09

## 2018-09-25 RX ADMIN — HEPARIN SODIUM 5000 UNITS: 5000 INJECTION, SOLUTION INTRAVENOUS; SUBCUTANEOUS at 06:09

## 2018-09-25 RX ADMIN — SIMVASTATIN 40 MG: 20 TABLET, FILM COATED ORAL at 10:09

## 2018-09-25 RX ADMIN — HEPARIN SODIUM 5000 UNITS: 5000 INJECTION, SOLUTION INTRAVENOUS; SUBCUTANEOUS at 10:09

## 2018-09-25 RX ADMIN — PANTOPRAZOLE SODIUM 40 MG: 40 TABLET, DELAYED RELEASE ORAL at 09:09

## 2018-09-25 RX ADMIN — INSULIN DETEMIR 7 UNITS: 100 INJECTION, SOLUTION SUBCUTANEOUS at 09:09

## 2018-09-25 RX ADMIN — LOSARTAN POTASSIUM 100 MG: 50 TABLET, FILM COATED ORAL at 09:09

## 2018-09-25 RX ADMIN — PIPERACILLIN SODIUM AND TAZOBACTAM SODIUM 4.5 G: 4; .5 INJECTION, POWDER, LYOPHILIZED, FOR SOLUTION INTRAVENOUS at 06:09

## 2018-09-25 RX ADMIN — FLUTICASONE FUROATE AND VILANTEROL TRIFENATATE 1 PUFF: 100; 25 POWDER RESPIRATORY (INHALATION) at 09:09

## 2018-09-25 NOTE — MEDICAL/APP STUDENT
[facility] Hospital Medicine  Progress Note    Patient Name: Sheryl Martines  MRN: [id number]  Patient Class: OP- Observation   Admission Date: 9/23/2018  Length of Stay: 0 days  Attending Physician: Keyla Martell MD  Primary Care Provider: Primary Doctor [facility] Medicine Team: [facility]OSP MED [place]    Subjective:     Principal Problem:Altered mental status    Ms. Martines is a 63-year-old female with recent appendicitis s/p appendectomy, COPD, IDDM, CAD, pontine stroke (2012) presents from [facility] to the ED for intermittent altered mental status (per SNF) and lethargy of 2 days. Patient and daughter denies AMS and is attributing drowsiness to pain medications.   She also c/o loose stool, dizziness, and dysuria. She denied fever, chills, headache, chest pain, SOB, nausea, and vomiting.     Patient currently living in rehab facility for reconditioning and treatment of wound infection s/p appendectomy on 8/26. On previous admission, she presented with slurred speech and AMS. EEG was done at the time and did not show any seizure activity. She found to have appendicitis with peritonitis, and underwent open appendectomy. Her abdominal wound cultures positive for  MRSA, enterococcus, & e. coli.  Also positive for c. diff during admission. She was discharged with PO vanc (stop Sept 13), clinda (1 week), and flagyl (stop Sept 13) per ID/ surgery.       At SNF, she was on Ertapenem (started 9/20) and Vancomycin (started 9/21) until this transfered to ED for present admission.       Hospital Course:  No notes on file     Interval History:  Patient was confused last night. Did not require PRN. Vitals stable. Patient denies any pain however still unable to open eyes. Speech/PT/Ot saw her yesterday.       Review of Systems   Constitutional: Positive for activity change. Negative for fatigue and fever.   HENT: Negative for sore throat and voice change.    Respiratory: Negative for cough, choking, chest  tightness and shortness of breath.    Cardiovascular: Negative for chest pain and leg swelling.   Gastrointestinal: Positive for abdominal pain. Negative for abdominal distention, constipation, diarrhea, nausea and vomiting.   Endocrine: Negative for polydipsia, polyphagia and polyuria.   Genitourinary: Negative for dysuria, frequency and urgency.   Musculoskeletal: Positive for myalgias.   Neurological: Negative for tremors, weakness and numbness.   Psychiatric/Behavioral: Negative for agitation and confusion.     Objective:     Vital Signs (Most Recent):  Temp: 97.1 °F (36.2 °C) (09/25/18 0831)  Pulse: (!) 112 (09/25/18 1105)  Resp: 20 (09/25/18 0914)  BP: (!) 157/76 (09/25/18 0831)  SpO2: 100 % (09/25/18 0831) Vital Signs (24h Range):  Temp:  [97.1 °F (36.2 °C)-97.6 °F (36.4 °C)] 97.1 °F (36.2 °C)  Pulse:  [] 112  Resp:  [20-34] 20  SpO2:  [99 %-100 %] 100 %  BP: (157-177)/(70-92) 157/76     Weight: 74.6 kg (164 lb 6.4 oz)  Body mass index is 32.11 kg/m².    Intake/Output Summary (Last 24 hours) at 9/25/2018 1204  Last data filed at 9/24/2018 1217  Gross per 24 hour   Intake 180 ml   Output --   Net 180 ml      Physical Exam   Constitutional: No distress.   HENT:   Head: Normocephalic and atraumatic.   Neck: Normal range of motion. Neck supple.   Cardiovascular: Normal rate, regular rhythm and normal heart sounds.   No murmur heard.  Pulmonary/Chest: Effort normal and breath sounds normal.   Abdominal: She exhibits no distension and no mass. There is generalized tenderness. There is no rebound.   Musculoskeletal: She exhibits tenderness. She exhibits no edema.   Skin: Skin is warm and dry.   Psychiatric: Her behavior is normal. Her speech is rapid and/or pressured and slurred.       Significant Labs: {results:32006}  Recent Results (from the past 24 hour(s))   POCT glucose    Collection Time: 09/24/18 12:24 PM   Result Value Ref Range    POCT Glucose 113 (H) 70 - 110 mg/dL   POCT glucose    Collection Time:  09/24/18  4:37 PM   Result Value Ref Range    POCT Glucose 97 70 - 110 mg/dL   POCT glucose    Collection Time: 09/24/18  9:24 PM   Result Value Ref Range    POCT Glucose 95 70 - 110 mg/dL   Comprehensive Metabolic Panel (CMP)    Collection Time: 09/25/18  5:44 AM   Result Value Ref Range    Sodium 143 136 - 145 mmol/L    Potassium 4.4 3.5 - 5.1 mmol/L    Chloride 108 95 - 110 mmol/L    CO2 24 23 - 29 mmol/L    Glucose 129 (H) 70 - 110 mg/dL    BUN, Bld 12 8 - 23 mg/dL    Creatinine 1.0 0.5 - 1.4 mg/dL    Calcium 9.3 8.7 - 10.5 mg/dL    Total Protein 6.2 6.0 - 8.4 g/dL    Albumin 2.7 (L) 3.5 - 5.2 g/dL    Total Bilirubin 0.5 0.1 - 1.0 mg/dL    Alkaline Phosphatase 90 55 - 135 U/L    AST 28 10 - 40 U/L    ALT 22 10 - 44 U/L    Anion Gap 11 8 - 16 mmol/L    eGFR if African American >60.0 >60 mL/min/1.73 m^2    eGFR if non African American >60.0 >60 mL/min/1.73 m^2   Magnesium    Collection Time: 09/25/18  5:44 AM   Result Value Ref Range    Magnesium 2.5 1.6 - 2.6 mg/dL   Phosphorus    Collection Time: 09/25/18  5:44 AM   Result Value Ref Range    Phosphorus 3.7 2.7 - 4.5 mg/dL       Significant Imaging: {Imaging Review:32250}    Assessment/Plan:      Active Diagnoses:    Diagnosis Date Noted POA    PRINCIPAL PROBLEM:  Altered mental status [R41.82] 09/23/2018 Yes    Atrial fibrillation [I48.91]  Yes    Tachycardia [R00.0]  Yes    Encephalopathy, metabolic [G93.41]  Yes    Abdominal infection [K65.9]  Yes    Essential hypertension [I10] 09/21/2018 Yes    Gastroesophageal reflux disease without esophagitis [K21.9] 09/21/2018 Yes    Debilitated patient [R53.81]  Yes    S/P appendectomy [Z90.49] 09/11/2018 Not Applicable    Moderate asthma without complication [J45.909] 09/11/2018 Yes    (HFpEF) heart failure with preserved ejection fraction [I50.30] 09/11/2018 Yes    Clostridium difficile infection [B96.89] 08/30/2018 Yes    NIKHIL (acute kidney injury) [N17.9]  Unknown    Insulin dependent diabetes mellitus  [E11.9, Z79.4] 08/23/2018 Not Applicable    CAD (coronary artery disease) [I25.10] 08/23/2018 Yes    Closed compression fracture of fourth lumbar vertebra [S32.040A] 08/23/2018 Yes      Problems Resolved During this Admission:     VTE Risk Mitigation (From admission, onward)        Ordered     heparin, porcine (PF) 100 unit/mL injection flush 500 Units  As needed (PRN)      09/24/18 0145     heparin (porcine) injection 5,000 Units  Every 8 hours      09/23/18 2100             Gloria [place]  Department of Hospital Medicine   [facility]

## 2018-09-25 NOTE — ASSESSMENT & PLAN NOTE
Cr 1.4 on admit. Baseline 0.8. Likely pre-renal 2/2 poor intake in setting of AMS  Trending 1.4 --> 0.9  - UA NAD  - +2.9L/24 hrs  - Patient refusing PO intake, family expressing concern pt not drinking  - Have started low dose maintenance IVF  - Monitor BMP

## 2018-09-25 NOTE — ASSESSMENT & PLAN NOTE
- Breo daily  - Theophylline 200 mg daily  -  Albuterol PRN  - Pred 10mg od as per daughter  - Relative contraindication for beta blockers in HTN mgmt given significant difficult to control asthma on daily oral pred

## 2018-09-25 NOTE — PT/OT/SLP PROGRESS
"Physical Therapy Treatment    Patient Name:  Sheryl Martines   MRN:  [id number]    Recommendations:     Discharge Recommendations:  nursing facility, skilled   Discharge Equipment Recommendations: (defer to SNF )   Barriers to discharge: Decreased caregiver support    Assessment:     Sheryl Martines is a 63 y.o. female admitted with a medical diagnosis of Altered mental status.  She presents with the following impairments/functional limitations:  weakness, impaired endurance, impaired self care skills, impaired functional mobilty, impaired cognition, impaired balance, gait instability, decreased upper extremity function, decreased safety awareness, pain, impaired coordination, decreased lower extremity function, impaired cardiopulmonary response to activity pt had difficulty following commands required increase time to perform, speech was unclear at times, and she had difficulty with mobility.  She required mod assist for bed mobility and had poor sitting balance while at EOB leaning posteriorly while sitting.  She will require extensive therapy upon d/c from the acute hospital likely in a SNF.  Her mobiltiy today was limited by her cognition.      Rehab Prognosis:  good; patient would benefit from acute skilled PT services to address these deficits and reach maximum level of function.      Recent Surgery: * No surgery found *      Plan:     During this hospitalization, patient to be seen 4 x/week to address the above listed problems via gait training, therapeutic activities, therapeutic exercises  · Plan of Care Expires:  10/24/18   Plan of Care Reviewed with: patient    Subjective     Communicated with nurse prior to session.  Patient found supine constant moving of arms and bilateral LE's upon PT entry to room, agreeable to treatment.      Chief Complaint: patient complained by grimacing and stated "ouch" when therapist touched bilateral LE's   Patient comments/goals: patient stated " I need to pee" "   Pain/Comfort:  · Pain Rating 1: (patient moaned and complained of pain in bilateral LE's to touch)    Patients cultural, spiritual, Latter-day conflicts given the current situation: none    Objective:     Patient found with: telemetry, pulse ox (continuous), blood pressure cuff, peripheral IV patient was usinf secondary muscles to breath, mouth breathing oxygen saturation was <95% on SPO2 throughout treatment.     General Precautions: Standard, fall, aspiration, NPO, contact(per RN Cdiff, MRSA)   Orthopedic Precautions:N/A   Braces: N/A     Functional Mobility: nurse was available to assist with patient   · Bed Mobility:     · Roll right/left with Min A/moderate A  increase time and assistance for hand placement several times for bed pan placement/removal. Patient also required assistance for cleaning and drawsheet positioning.   · Scooting: total assistance of 2 people and drawsheet to HOB in supine with bed in trendelenburg   · Supine to Sit: moderate assistance and increased time.  pt very slow and with significant posterior lean reported during initial EVAL her daughter is her normal mobiltiy due to multiple compression fractures  · Sit to Supine: total A required assistance for trunk and bilateral LE back onto bed        AM-PAC 6 CLICK MOBILITY  Turning over in bed (including adjusting bedclothes, sheets and blankets)?: 3  Sitting down on and standing up from a chair with arms (e.g., wheelchair, bedside commode, etc.): 1  Moving from lying on back to sitting on the side of the bed?: 1  Moving to and from a bed to a chair (including a wheelchair)?: 1  Need to walk in hospital room?: 1  Climbing 3-5 steps with a railing?: 1  Basic Mobility Total Score: 8       Therapeutic Activities and Exercises:  Balance: pt sat at EOB for about 8 minutes leanning posteriorrly most of the time requiring min A/moderated A  for balance.  pt with complaints of having to urinated so patient was returned to supine and put on bed  pan.     Patient left supine with all lines intact, call button in reach, nurse notified and nurse and respiratory therapist  present..    GOALS:   Multidisciplinary Problems     Physical Therapy Goals        Problem: Physical Therapy Goal    Goal Priority Disciplines Outcome Goal Variances Interventions   Physical Therapy Goal     PT, PT/OT Ongoing (interventions implemented as appropriate)     Description:  Goals to be met by: 10/1     Patient will increase functional independence with mobility by performin. Supine to sit with Contact Guard Assistance  2. Sit to supine with Contact Guard Assistance  3. Sit to stand transfer with Minimal Assistance with RW  4. Bed to chair transfer with Minimal Assistance using Rolling Walker  5. Gait  x 25 feet with Minimal Assistance using Rolling Walker.                       Time Tracking:     PT Received On: 18  PT Start Time: 1405     PT Stop Time: 1430  PT Total Time (min): 25 min     Billable Minutes: Therapeutic Activity 25    Treatment Type: Treatment  PT/PTA: PTA     PTA Visit Number: 1     Radha Laguna PTA  2018

## 2018-09-25 NOTE — PLAN OF CARE
Problem: Physical Therapy Goal  Goal: Physical Therapy Goal  Goals to be met by: 10/1     Patient will increase functional independence with mobility by performin. Supine to sit with Contact Guard Assistance  2. Sit to supine with Contact Guard Assistance  3. Sit to stand transfer with Minimal Assistance with RW  4. Bed to chair transfer with Minimal Assistance using Rolling Walker  5. Gait  x 25 feet with Minimal Assistance using Rolling Walker.      Outcome: Ongoing (interventions implemented as appropriate)    Patient was mostly nonverbal and required constant verbal/tactile cues to follow commands. Increase time to perform task.

## 2018-09-25 NOTE — ASSESSMENT & PLAN NOTE
Pt with chronic back pain. S/p spinal stimulator placement    - Avoiding opiates for now because of AMS  - Baclofen PRN for pain  - Started gabapentin

## 2018-09-25 NOTE — ASSESSMENT & PLAN NOTE
"Pt recently hospitalized for appendicitis with peritonitis. She had open appendectomy 8/26 with wound cultures positive for MRSA, enterococcus, and E.col. She was discharged with PO vanc (stop Sept 13), clinda (1 week), and flagyl (stop Sept 13).   At SNF , she was on Ertapenem (started 9/20) and Vancomycin (started 9/21) until this transfered to ED for present admission    A/P per gen surg:  - Unconvinced of SBP/ acute abdomen, or surgical site infection - no WCC no fever, no signs infection at incision site  - RLQ pelvic fluid collection on CT but has been shrinking on its own when compared to previous imaging, do not recommend draining at this time. Likely hematoma or seroma  - Broad spec abx coverage per ID at OSH "4 wks antibiotic coverage post d/c with re-imaging to reassess fluid collection"  "

## 2018-09-25 NOTE — ASSESSMENT & PLAN NOTE
Pt presents from SNF with AMS and abdo pain (although pt denies AMS and abdominal pain during review of systems).  - CT head showed no acute path  - Daughter believes pt is altered also, poor speech articulation and concentration. Believes may be due to opioid medications  - Infectious workup negative thus far, opioids held but no tangible improvement in mental status    Plan:  - Started Vanc and Zosyn, continue until pelvic fluid collection has resolved on imaging follow up as per ID recs  - Low dose maintenance fluids as poor cooperation to PO intake  - Continue to hold opioids  - Delirium precautions  - Consider neurology consultation

## 2018-09-25 NOTE — PLAN OF CARE
Pt admitted to [facility] 9/20/18. Readmitted 2/2 AMS.  Extended Emergency Contact Information  Primary Emergency Contact: Citlaly Ritter   [facility]  Home Phone: [phone]  Relation: Daughter    Primary Doctor No    Payor: BCBS [place] MEDICARE / Plan: BCBS OUT OF STATE MEDICARE ADVANTAGE / Product Type: Medicare Advantage /       [id number] - [place], [place] - [address] AT [facility] OF [facility] & [address]  [address]  [place] [address]  Phone: [phone] Fax: [phone]    [facility] - [place], [place] - [address] AT [facility] OF [facility] & [address]  [address]  [place] [address]  Phone: [phone] Fax: [phone]    [facility] [place]  [address]  [place] [address]  Phone: [phone] Fax: [phone]    Payor: BCBS [place] MEDICARE / Plan: BCBS OUT OF STATE MEDICARE ADVANTAGE / Product Type: Medicare Advantage /        09/25/18 1710   Discharge Assessment   Assessment Type Discharge Planning Assessment   Assessment information obtained from? Medical Record   Expected Length of Stay (days) 5   Prior to hospitilization cognitive status: Alert/Oriented   Prior to hospitalization functional status: Assistive Equipment   Current Functional Status: Assistive Equipment   Lives With child(nandini), adult   Able to Return to Prior Arrangements unable to determine at this time (comments)   Is patient able to care for self after discharge? Unable to determine at this time (comments)   Patient's perception of discharge disposition skilled nursing facility   Readmission Within The Last 30 Days previous discharge plan unsuccessful   Patient currently being followed by outpatient case management? No   Patient currently receives any other outside agency services? No   Equipment Currently Used at Home shower chair;bedside commode;rollator   Do you have any problems affording any of your prescribed medications? TBD   Is the  patient taking medications as prescribed? yes   Does the patient have transportation home? Yes   Transportation Available family or friend will provide   Discharge Plan A Skilled Nursing Facility   Discharge Plan B Long-term acute care facility (LTAC)   Patient/Family In Agreement With Plan unable to assess

## 2018-09-25 NOTE — ASSESSMENT & PLAN NOTE
Recent hospitalization positive for C. Diff infection. Treated with PO vanc and flagyl.   Pt presently reporting loose, but not watery, stool    - Repeat C. Diff panel pending

## 2018-09-25 NOTE — SUBJECTIVE & OBJECTIVE
Interval History:  - NAEO  - Pt agitated, poor concentration. AAOx3  - Pt SBPs 150-170s, remains afebrile  - New onset asymptomatic afib 9/24 AM     Past Medical History:   Diagnosis Date    Asthma     Closed compression fracture of fourth lumbar vertebra     COPD (chronic obstructive pulmonary disease)     Coronary artery disease     Diabetes mellitus     Glaucoma     High cholesterol     Hypertension     Iritis     Pulmonary embolus     Stroke     rt sided weakness.       Past Surgical History:   Procedure Laterality Date    ABDOMINAL SURGERY      APPENDECTOMY N/A 8/26/2018    Procedure: APPENDECTOMY;  Surgeon: Bernadine Melendrez MD;  Location: [facility] OR;  Service: General;  Laterality: N/A;    APPENDECTOMY N/A 8/26/2018    Performed by Bernadine Melendrez MD at [facility] OR    APPENDECTOMY, LAPAROSCOPIC---CONVERTED TO OPEN APPENDECTOMY @0950 N/A 8/26/2018    Performed by Bernadine Melendrez MD at [facility] OR    BACK SURGERY      stimulator    CATARACT EXTRACTION      HYSTERECTOMY      LAPAROSCOPIC APPENDECTOMY N/A 8/26/2018    Procedure: APPENDECTOMY, LAPAROSCOPIC---CONVERTED TO OPEN APPENDECTOMY @0950;  Surgeon: Bernadine Melendrez MD;  Location: [facility] OR;  Service: General;  Laterality: N/A;       Review of patient's allergies indicates:   Allergen Reactions    Ace inhibitors Swelling    Corticosteroids (glucocorticoids)     Hydralazine analogues     Tetracyclines Swelling    Travatan (with benzalkonium) [travoprost (benzalkonium)]        Current Facility-Administered Medications on File Prior to Encounter   Medication    [MAR Hold - Suspended Admission] acetaminophen tablet 650 mg    [MAR Hold - Suspended Admission] acetaminophen tablet 650 mg    [MAR Hold - Suspended Admission] albuterol-ipratropium 2.5 mg-0.5 mg/3 mL nebulizer solution 3 mL    [MAR Hold - Suspended Admission] aspirin EC tablet 81 mg    [MAR Hold - Suspended Admission] calcium carbonate 200 mg calcium (500 mg) chewable  tablet 500 mg    [MAR Hold - Suspended Admission] clopidogrel tablet 75 mg    [MAR Hold - Suspended Admission] dextrose 50% injection 12.5 g    [MAR Hold - Suspended Admission] dextrose 50% injection 25 g    [MAR Hold - Suspended Admission] diltiaZEM 24 hr capsule 180 mg    [MAR Hold - Suspended Admission] DULoxetine DR capsule 60 mg    [MAR Hold - Suspended Admission] enoxaparin injection 40 mg    [MAR Hold - Suspended Admission] ertapenem (INVANZ) 1 g in sodium chloride 0.9 % 100 mL IVPB (ready to mix system)    [MAR Hold - Suspended Admission] furosemide tablet 40 mg    [MAR Hold - Suspended Admission] glucagon (human recombinant) injection 1 mg    [MAR Hold - Suspended Admission] glucose chewable tablet 16 g    [MAR Hold - Suspended Admission] glucose chewable tablet 24 g    [MAR Hold - Suspended Admission] insulin aspart U-100 pen 0-5 Units    [MAR Hold - Suspended Admission] insulin detemir U-100 pen 10 Units    [MAR Hold - Suspended Admission] Lactobacillus rhamnosus GG capsule 1 capsule    [MAR Hold - Suspended Admission] losartan tablet 100 mg    [MAR Hold - Suspended Admission] ondansetron disintegrating tablet 8 mg    [MAR Hold - Suspended Admission] ondansetron tablet 4 mg    [MAR Hold - Suspended Admission] pantoprazole EC tablet 40 mg    [MAR Hold - Suspended Admission] prazosin capsule 5 mg    [MAR Hold - Suspended Admission] predniSONE tablet 10 mg    [MAR Hold - Suspended Admission] ramelteon tablet 8 mg    [MAR Hold - Suspended Admission] senna-docusate 8.6-50 mg per tablet 1 tablet    [MAR Hold - Suspended Admission] simethicone chewable tablet 160 mg    [MAR Hold - Suspended Admission] simvastatin tablet 40 mg    [MAR Hold - Suspended Admission] theophylline 24 hr capsule 200 mg    [MAR Hold - Suspended Admission] vancomycin 750 mg in dextrose 5 % 250 mL IVPB (ready to mix system)    [MAR Hold - Suspended Admission] zinc oxide 20 % ointment     Current Outpatient  Medications on File Prior to Encounter   Medication Sig    albuterol-ipratropium (DUO-NEB) 2.5 mg-0.5 mg/3 mL nebulizer solution Take 3 mLs by nebulization every 4 (four) hours. Rescue    aspirin (ECOTRIN) 81 MG EC tablet Take 1 tablet (81 mg total) by mouth once daily.    clopidogrel (PLAVIX) 75 mg tablet Take 75 mg by mouth once daily.    diltiaZEM (CARDIZEM CD) 180 MG 24 hr capsule Take 180 mg by mouth once daily.    DULoxetine (CYMBALTA) 60 MG capsule Take 60 mg by mouth once daily.    enoxaparin (LOVENOX) 40 mg/0.4 mL Syrg Inject 40 mg into the skin once daily.    furosemide (LASIX) 40 MG tablet Take 40 mg by mouth once daily.     insulin aspart U-100 (NOVOLOG) 100 unit/mL injection Inject 0-5 Units into the skin 4 (four) times daily as needed for High Blood Sugar.    insulin detemir U-100 (LEVEMIR) 100 unit/mL injection Inject 10 Units into the skin 2 (two) times daily.     Lactobacillus acidophilus 1 billion cell Cap Take 1 tablet by mouth 2 (two) times daily.    losartan (COZAAR) 100 MG tablet Take 100 mg by mouth once daily.     megestrol (MEGACE) 40 MG Tab Take 40 mg by mouth 2 (two) times daily.    ondansetron (ZOFRAN) 4 MG tablet Take 1 tablet (4 mg total) by mouth every 6 (six) hours as needed.    pantoprazole (PROTONIX) 40 MG tablet Take 40 mg by mouth once daily.    prazosin (MINIPRESS) 5 MG capsule Take 5 mg by mouth 2 (two) times daily.     predniSONE (DELTASONE) 10 MG tablet Take 10 mg by mouth 2 (two) times daily.     senna-docusate 8.6-50 mg (SENNA WITH DOCUSATE SODIUM) 8.6-50 mg per tablet Take 1 tablet by mouth 2 (two) times daily.    simethicone 80 mg Tab Take 160 mg by mouth every 6 (six) hours as needed for Flatulence.    simvastatin (ZOCOR) 40 MG tablet Take 40 mg by mouth every evening.    theophylline (THEODUR) 200 MG 12 hr tablet Take 200 mg by mouth once daily.      Family History     Problem Relation (Age of Onset)    Cancer Father    Diabetes Brother        Tobacco  Use    Smoking status: Never Smoker   Substance and Sexual Activity    Alcohol use: No     Frequency: Never    Drug use: No    Sexual activity: No     Partners: Male     Review of Systems   Constitutional: Positive for activity change and fatigue. Negative for appetite change, chills, diaphoresis and fever.   HENT: Positive for trouble swallowing (pills). Negative for facial swelling and sore throat.    Eyes: Negative for photophobia, pain and visual disturbance.   Respiratory: Negative for cough, chest tightness, shortness of breath and wheezing.    Cardiovascular: Negative for chest pain, palpitations and leg swelling.   Gastrointestinal: Positive for abdominal pain and diarrhea. Negative for blood in stool, constipation, nausea and vomiting.   Genitourinary: Negative for difficulty urinating, dysuria and flank pain.   Musculoskeletal: Positive for arthralgias, back pain and gait problem. Negative for neck pain and neck stiffness.   Skin: Positive for wound (Surgical scar, RLQ).   Neurological: Positive for dizziness and weakness. Negative for syncope, numbness and headaches.   Psychiatric/Behavioral: Positive for confusion and decreased concentration. Negative for hallucinations. The patient is not nervous/anxious.      Objective:     Vital Signs (Most Recent):  Temp: 98 °F (36.7 °C) (09/25/18 1721)  Pulse: 95 (09/25/18 1721)  Resp: 20 (09/25/18 1721)  BP: (!) 159/78 (09/25/18 1721)  SpO2: 100 % (09/25/18 1721) Vital Signs (24h Range):  Temp:  [97.1 °F (36.2 °C)-98 °F (36.7 °C)] 98 °F (36.7 °C)  Pulse:  [] 95  Resp:  [20-34] 20  SpO2:  [99 %-100 %] 100 %  BP: (157-177)/(70-78) 159/78     Weight: 74.6 kg (164 lb 6.4 oz)  Body mass index is 32.11 kg/m².    Physical Exam   Constitutional: She is oriented to person, place, and time. She appears well-developed and well-nourished. No distress.   HENT:   Head: Normocephalic and atraumatic.   Mouth/Throat: Oropharynx is clear and moist.   Eyes: Conjunctivae are  normal. No scleral icterus.   Neck: Normal range of motion. Neck supple.   Cardiovascular: Normal heart sounds and intact distal pulses.   Tachycardia to 105, irregularly irregular   Pulmonary/Chest: Effort normal and breath sounds normal. No respiratory distress. She has no wheezes. She has no rales. She exhibits no tenderness.   Abdominal: Soft. Bowel sounds are normal. There is generalized tenderness. There is no rebound and no guarding.       Musculoskeletal: She exhibits no edema, tenderness or deformity.   Spinal cord stimulator in place   Lymphadenopathy:     She has no cervical adenopathy.   Neurological: She is alert and oriented to person, place, and time.   Intermittent periods of lethargy & confusion during exam   Skin: Skin is warm and dry. She is not diaphoretic.   Psychiatric: Her speech is rapid and/or pressured and slurred. She is not agitated and not combative.   Distractible, poor concentration, does not like to open eyes although she is alert, orientedx3 She is inattentive.           Significant Labs:   A1C:   Recent Labs   Lab  08/23/18   0217   HGBA1C  8.1*     ABGs:   No results for input(s): PH, PCO2, HCO3, POCSATURATED, BE, TOTALHB, COHB, METHB, O2HB, POCFIO2 in the last 48 hours.  Blood Culture:   No results for input(s): LABBLOO in the last 48 hours.  CBC:   No results for input(s): WBC, HGB, HCT, PLT in the last 48 hours.  CMP:   Recent Labs   Lab  09/24/18   0722  09/25/18   0544   NA  140  143   K  3.7  4.4   CL  108  108   CO2  26  24   GLU  109  129*   BUN  12  12   CREATININE  0.9  1.0   CALCIUM  8.6*  9.3   PROT  5.8*  6.2   ALBUMIN  2.6*  2.7*   BILITOT  0.5  0.5   ALKPHOS  82  90   AST  19  28   ALT  19  22   ANIONGAP  6*  11   EGFRNONAA  >60.0  >60.0     Lactic Acid:   No results for input(s): LACTATE in the last 48 hours.    Significant Imaging: I have reviewed all pertinent imaging results/findings within the past 24 hours.

## 2018-09-25 NOTE — HOSPITAL COURSE
General surgery consulted and do not believe pt has current surgical complication or secondary infection. Open appy wound site is now healed and not infected at present, intrabdominal RLQ pelvic collection (believed to be hematoma or seroma) is shrinking on abdo CT comparison to previous imaging and not convincing for infection given no WCC or fever per gen surg. CT-H is negative for acute intracranial process. EEG shows no epileptiform activity. Blood cultures are NGTD.  Made NPO temporarily per SLP recs due to concern for aspiration risk following CO2 narcosis 9/26 (pH 7.31 CO2 69) with decreased mentation. Pt started on bipap with resolution of acidosis and improvement of mental status. 9/28 mental status continued to improve and pt is now fully AAOx3 with good concentration, spontaneous eye opening, and ability to eat and drink. AMS at presentation likely due to CO2 retention. 10/2 Zosyn/Vanc has been d/c as per ID recs as pelvic collection not concern for infection at this time and slowly receding. Pt now feels at her baseline, no further infectious or respiratory symptoms.

## 2018-09-25 NOTE — ASSESSMENT & PLAN NOTE
Pt recently hospitalized for appendicitis with peritonitis. She had open appendectomy 8/26 with wound cultures positive for MRSA, enterococcus, and E.col. She was discharged with PO vanc (stop Sept 13), clinda (1 week), and flagyl (stop Sept 13).   At SNF , she was on Ertapenem (started 9/20) and Vancomycin (started 9/21) until this transfered to ED for present admission    A/P per gen surg:  - Unconvinced of SBP/ acute abdomen, or surgical site infection - no WCC no fever, no signs infection at incision site  - RLQ pelvic fluid collection on CT but has been shrinking on its own when compared to previous imaging, do not recommend draining at this time. Likely hematoma or seroma  - Continue Vanc and Zosyn per ID until resolution of pelvic fluid collection on follow up imaging

## 2018-09-25 NOTE — PROGRESS NOTES
[facility] Hospital Medicine  Progress Note    Patient Name: Sheryl Martines  MRN: [id number]  Patient Class: OP- Observation   Admission Date: 9/23/2018  Length of Stay: 0 days  Attending Physician: Servando García, *  Primary [facility] Provider: [facility] Medicine Team: [facility] [place] [facility]OSP MED 1 Wayne Morris MD    Subjective:     Principal Problem:Altered mental status    HPI:  Ms. Martines is a 63-year-old female with recent appendicitis s/p appendectomy, COPD, IDDM, CAD, pontine stroke (2012) presents from SNF for intermittent altered mental status (per SNF) and lethargy of 2 days. Patient denies AMS herself and is attributing drowsiness to pain medications.   She also c/o loose stool and dizziness. She denied fever, chills, headache, chest pain, SOB, nausea, vomiting, dysuria.    Patient currently living in rehab facility for reconditioning and treatment of wound infection s/p appendectomy on 8/26. On previous admission, she presented with slurred speech and AMS. EEG was done at the time and did not show any seizure activity. She found to have appendicitis with peritonitis, and underwent open appendectomy. Her abdominal wound cultures positive for  MRSA, enterococcus, & e. coli.  Also positive for c. diff during admission. She was discharged with PO vanc (stop Sept 13), clinda (1 week), and flagyl (stop Sept 13) per ID/ surgery.    At SNF, she was on Ertapenem (started 9/20) and Vancomycin (started 9/21) until this transfered to ED for present admission.      Hospital Course:  No notes on file    Interval History:  - Pt agitated throughout day  - Pt spiking SBP to 180s overnight, afebrile  - New onset non-RVR asymptomatic afib this morning   - 4x loose bm non-bloody    Past Medical History:   Diagnosis Date    Asthma     Closed compression fracture of fourth lumbar vertebra     COPD (chronic obstructive pulmonary disease)     Coronary artery disease     Diabetes mellitus      Glaucoma     High cholesterol     Hypertension     Iritis     Pulmonary embolus     Stroke     rt sided weakness.       Past Surgical History:   Procedure Laterality Date    ABDOMINAL SURGERY      APPENDECTOMY N/A 8/26/2018    Procedure: APPENDECTOMY;  Surgeon: Bernadine Melendrez MD;  Location: [facility] OR;  Service: General;  Laterality: N/A;    APPENDECTOMY N/A 8/26/2018    Performed by Bernadine Melendrez MD at [facility] OR    APPENDECTOMY, LAPAROSCOPIC---CONVERTED TO OPEN APPENDECTOMY @0950 N/A 8/26/2018    Performed by Bernadine Melendrez MD at [facility] OR    BACK SURGERY      stimulator    CATARACT EXTRACTION      HYSTERECTOMY      LAPAROSCOPIC APPENDECTOMY N/A 8/26/2018    Procedure: APPENDECTOMY, LAPAROSCOPIC---CONVERTED TO OPEN APPENDECTOMY @0950;  Surgeon: Bernadine Melendrez MD;  Location: [facility] OR;  Service: General;  Laterality: N/A;       Review of patient's allergies indicates:   Allergen Reactions    Ace inhibitors Swelling    Corticosteroids (glucocorticoids)     Hydralazine analogues     Tetracyclines Swelling    Travatan (with benzalkonium) [travoprost (benzalkonium)]        Current Facility-Administered Medications on File Prior to Encounter   Medication    [MAR Hold - Suspended Admission] acetaminophen tablet 650 mg    [MAR Hold - Suspended Admission] acetaminophen tablet 650 mg    [MAR Hold - Suspended Admission] albuterol-ipratropium 2.5 mg-0.5 mg/3 mL nebulizer solution 3 mL    [MAR Hold - Suspended Admission] aspirin EC tablet 81 mg    [MAR Hold - Suspended Admission] calcium carbonate 200 mg calcium (500 mg) chewable tablet 500 mg    [MAR Hold - Suspended Admission] clopidogrel tablet 75 mg    [MAR Hold - Suspended Admission] dextrose 50% injection 12.5 g    [MAR Hold - Suspended Admission] dextrose 50% injection 25 g    [MAR Hold - Suspended Admission] diltiaZEM 24 hr capsule 180 mg    [MAR Hold - Suspended Admission] DULoxetine DR capsule 60 mg    [MAR Hold - Suspended  Admission] enoxaparin injection 40 mg    [MAR Hold - Suspended Admission] ertapenem (INVANZ) 1 g in sodium chloride 0.9 % 100 mL IVPB (ready to mix system)    [MAR Hold - Suspended Admission] furosemide tablet 40 mg    [MAR Hold - Suspended Admission] glucagon (human recombinant) injection 1 mg    [MAR Hold - Suspended Admission] glucose chewable tablet 16 g    [MAR Hold - Suspended Admission] glucose chewable tablet 24 g    [MAR Hold - Suspended Admission] insulin aspart U-100 pen 0-5 Units    [MAR Hold - Suspended Admission] insulin detemir U-100 pen 10 Units    [MAR Hold - Suspended Admission] Lactobacillus rhamnosus GG capsule 1 capsule    [MAR Hold - Suspended Admission] losartan tablet 100 mg    [MAR Hold - Suspended Admission] ondansetron disintegrating tablet 8 mg    [MAR Hold - Suspended Admission] ondansetron tablet 4 mg    [MAR Hold - Suspended Admission] pantoprazole EC tablet 40 mg    [MAR Hold - Suspended Admission] prazosin capsule 5 mg    [MAR Hold - Suspended Admission] predniSONE tablet 10 mg    [MAR Hold - Suspended Admission] ramelteon tablet 8 mg    [MAR Hold - Suspended Admission] senna-docusate 8.6-50 mg per tablet 1 tablet    [MAR Hold - Suspended Admission] simethicone chewable tablet 160 mg    [MAR Hold - Suspended Admission] simvastatin tablet 40 mg    [MAR Hold - Suspended Admission] theophylline 24 hr capsule 200 mg    [MAR Hold - Suspended Admission] vancomycin 750 mg in dextrose 5 % 250 mL IVPB (ready to mix system)    [MAR Hold - Suspended Admission] zinc oxide 20 % ointment     Current Outpatient Medications on File Prior to Encounter   Medication Sig    albuterol-ipratropium (DUO-NEB) 2.5 mg-0.5 mg/3 mL nebulizer solution Take 3 mLs by nebulization every 4 (four) hours. Rescue    aspirin (ECOTRIN) 81 MG EC tablet Take 1 tablet (81 mg total) by mouth once daily.    clopidogrel (PLAVIX) 75 mg tablet Take 75 mg by mouth once daily.    diltiaZEM (CARDIZEM CD)  180 MG 24 hr capsule Take 180 mg by mouth once daily.    DULoxetine (CYMBALTA) 60 MG capsule Take 60 mg by mouth once daily.    enoxaparin (LOVENOX) 40 mg/0.4 mL Syrg Inject 40 mg into the skin once daily.    furosemide (LASIX) 40 MG tablet Take 40 mg by mouth once daily.     insulin aspart U-100 (NOVOLOG) 100 unit/mL injection Inject 0-5 Units into the skin 4 (four) times daily as needed for High Blood Sugar.    insulin detemir U-100 (LEVEMIR) 100 unit/mL injection Inject 10 Units into the skin 2 (two) times daily.     Lactobacillus acidophilus 1 billion cell Cap Take 1 tablet by mouth 2 (two) times daily.    losartan (COZAAR) 100 MG tablet Take 100 mg by mouth once daily.     megestrol (MEGACE) 40 MG Tab Take 40 mg by mouth 2 (two) times daily.    ondansetron (ZOFRAN) 4 MG tablet Take 1 tablet (4 mg total) by mouth every 6 (six) hours as needed.    pantoprazole (PROTONIX) 40 MG tablet Take 40 mg by mouth once daily.    prazosin (MINIPRESS) 5 MG capsule Take 5 mg by mouth 2 (two) times daily.     predniSONE (DELTASONE) 10 MG tablet Take 10 mg by mouth 2 (two) times daily.     senna-docusate 8.6-50 mg (SENNA WITH DOCUSATE SODIUM) 8.6-50 mg per tablet Take 1 tablet by mouth 2 (two) times daily.    simethicone 80 mg Tab Take 160 mg by mouth every 6 (six) hours as needed for Flatulence.    simvastatin (ZOCOR) 40 MG tablet Take 40 mg by mouth every evening.    theophylline (THEODUR) 200 MG 12 hr tablet Take 200 mg by mouth once daily.      Family History     Problem Relation (Age of Onset)    Cancer Father    Diabetes Brother        Tobacco Use    Smoking status: Never Smoker   Substance and Sexual Activity    Alcohol use: No     Frequency: Never    Drug use: No    Sexual activity: No     Partners: Male     Review of Systems   Constitutional: Positive for activity change and fatigue. Negative for appetite change, chills, diaphoresis and fever.   HENT: Positive for trouble swallowing (pills). Negative  for facial swelling and sore throat.    Eyes: Negative for photophobia, pain and visual disturbance.   Respiratory: Negative for cough, chest tightness, shortness of breath and wheezing.    Cardiovascular: Negative for chest pain, palpitations and leg swelling.   Gastrointestinal: Positive for abdominal pain and diarrhea. Negative for blood in stool, constipation, nausea and vomiting.   Genitourinary: Negative for difficulty urinating, dysuria and flank pain.   Musculoskeletal: Positive for arthralgias, back pain and gait problem. Negative for neck pain and neck stiffness.   Skin: Positive for wound (Surgical scar, RLQ).   Neurological: Positive for dizziness and weakness. Negative for syncope, numbness and headaches.   Hematological: Bruises/bleeds easily.   Psychiatric/Behavioral: Positive for confusion. Negative for hallucinations. The patient is not nervous/anxious.      Objective:     Vital Signs (Most Recent):  Temp: 97.6 °F (36.4 °C) (09/24/18 1605)  Pulse: 84 (09/24/18 1605)  Resp: (!) 29 (09/24/18 1605)  BP: (!) 161/82 (09/24/18 1605)  SpO2: 100 % (09/24/18 1605) Vital Signs (24h Range):  Temp:  [96.2 °F (35.7 °C)-97.6 °F (36.4 °C)] 97.6 °F (36.4 °C)  Pulse:  [73-94] 84  Resp:  [22-29] 29  SpO2:  [90 %-100 %] 100 %  BP: (161-187)/() 161/82     Weight: 74.6 kg (164 lb 6.4 oz)  Body mass index is 32.11 kg/m².    Physical Exam   Constitutional: She is oriented to person, place, and time. She appears well-developed and well-nourished. No distress.   HENT:   Head: Normocephalic and atraumatic.   Mouth/Throat: Oropharynx is clear and moist.   Eyes: Conjunctivae are normal. No scleral icterus.   Neck: Normal range of motion. Neck supple.   Cardiovascular: Normal rate, regular rhythm, normal heart sounds and intact distal pulses.   Pulmonary/Chest: Effort normal and breath sounds normal. No respiratory distress. She has no wheezes. She has no rales. She exhibits no tenderness.   Abdominal: Soft. Bowel sounds  are normal. There is generalized tenderness. There is no rebound and no guarding.       Musculoskeletal: She exhibits no edema, tenderness or deformity.   Spinal cord stimulator in place   Lymphadenopathy:     She has no cervical adenopathy.   Neurological: She is alert and oriented to person, place, and time.   Intermittent periods of lethargy & confusion during exam   Skin: Skin is warm and dry. She is not diaphoretic.   Psychiatric: Her speech is rapid and/or pressured and slurred. She is not agitated and not combative. She is inattentive.           Significant Labs:   A1C:   Recent Labs   Lab  08/23/18   0217   HGBA1C  8.1*     ABGs:   Recent Labs   Lab  09/23/18   1251   PH  7.517*   PCO2  43.2   HCO3  35.1*   POCSATURATED  90*   BE  12     Blood Culture:   Recent Labs   Lab  09/23/18   1250  09/23/18   1407   LABBLOO  No Growth to date  No Growth to date  No Growth to date  No Growth to date     CBC:   Recent Labs   Lab  09/23/18   1250   WBC  9.13   HGB  12.0   HCT  39.3   PLT  228     CMP:   Recent Labs   Lab  09/23/18   1250  09/24/18   0722   NA  140  140   K  3.7  3.7   CL  102  108   CO2  30*  26   GLU  163*  109   BUN  18  12   CREATININE  1.4  0.9   CALCIUM  9.4  8.6*   PROT  6.4  5.8*   ALBUMIN  2.9*  2.6*   BILITOT  0.5  0.5   ALKPHOS  103  82   AST  26  19   ALT  24  19   ANIONGAP  8  6*   EGFRNONAA  40.0*  >60.0     Lactic Acid:   Recent Labs   Lab  09/23/18   1250  09/23/18   1804   LACTATE  1.7  1.2       Significant Imaging: I have reviewed all pertinent imaging results/findings within the past 24 hours.    Assessment/Plan:      * Altered mental status    Pt presents from SNF with AMS and abdomen exquisitely tender to palpation (although pt denies AMS and abdominal pain during review of systems). DDx include infection, dehydration, medication induced, & delirium.   - CT head showed no acute path    Plan:  - Started Vanc and Zosyn, pending BCx results  - Pt given IVFs: NS 30 ml/kg (2190 cc)  -  "Holding Cymbalta  - Avoid opioids for now  - Delirium precautions        Gastroesophageal reflux disease without esophagitis    - Pantoprazole 40 mg                     (HFpEF) heart failure with preserved ejection fraction    ESSENTIAL HYPERTENSION    Last ECHO 8/23/18 showing EF 60-65%, grade 1 diastolic dysfunction     - Lasix 40 mg daily  - Losartan 100 mg daily  - Prazosin 5 mg BID        Moderate asthma without complication    - Breo daily  - Theophylline 200 mg daily  -  Albuterol PRN  - Pred 10mg od as per daughter  - Relative contraindication for beta blockers in HTN mgmt given significant difficult to control asthma on daily oral pred                S/P appendectomy    Pt recently hospitalized for appendicitis with peritonitis. She had open appendectomy 8/26 with wound cultures positive for MRSA, enterococcus, and E.col. She was discharged with PO vanc (stop Sept 13), clinda (1 week), and flagyl (stop Sept 13).   At [facility] , she was on Ertapenem (started 9/20) and Vancomycin (started 9/21) until this transfered to ED for present admission    A/P per gen surg:  - Unconvinced of SBP/ acute abdomen, or surgical site infection - no WCC no fever, no signs infection at incision site  - RLQ pelvic fluid collection on CT but has been shrinking on its own when compared to previous imaging, do not recommend draining at this time. Likely hematoma or seroma  - Broad spec abx coverage per ID at OSH "4 wks antibiotic coverage post d/c with re-imaging to reassess fluid collection"        Clostridium difficile infection    Recent hospitalization positive for C. Diff infection. Treated with PO vanc and flagyl.   Pt presently reporting loose, but not watery, stool    - Repeat C. Diff panel          NIKHIL (acute kidney injury)    Cr 1.4 on admit. Baseline 0.8. Likely pre-renal 2/2 poor intake in setting of AMS  Trending 1.4 --> 0.9  - UA NAD  - +2.9L/24 hrs  - Patient refusing PO intake, family expressing concern pt not " drinking  - Have started low dose maintenance IVF  - Monitor BMP        Closed compression fracture of fourth lumbar vertebra    Pt with chronic back pain. S/p spinal stimulator placement    - Avoiding opiates for now because of AMS  - Baclofen PRN for pain        CAD (coronary artery disease)    - ASA 81  - Plavix 75  - Simvastatin 40           Insulin dependent diabetes mellitus    - 7 U detemir BID  - LDSSI  - Diabetic diet  Glucose well controlled 109-163 throughout admission            VTE Risk Mitigation (From admission, onward)        Ordered     heparin, porcine (PF) 100 unit/mL injection flush 500 Units  As needed (PRN)      09/24/18 0145     heparin (porcine) injection 5,000 Units  Every 8 hours      09/23/18 2100              Wayne Morris MD  Department of Hospital Medicine   [facility]

## 2018-09-25 NOTE — ASSESSMENT & PLAN NOTE
- 7 U detemir BID  - LDSSI  - Diabetic diet  Glucose well controlled 109-163 throughout admission

## 2018-09-25 NOTE — ASSESSMENT & PLAN NOTE
- Breo daily  - Theophylline 200 mg daily  -  Albuterol PRN  - Pred 10mg od as per daughter  - Relative contraindication for beta blockers in HTN/AF mgmt given significant difficult to control asthma on daily oral pred

## 2018-09-25 NOTE — PHARMACY MED REC
" Admission Medication Reconciliation - Pharmacy Consult Note    The home medication history was taken by Pinky Elder Tech.  Based on information gathered and subsequent review by the clinical pharmacist, the items below may need attention.     You may go to "Admission" then "Reconcile Home Medications" tabs to review and/or act upon these items.     Potentially problematic discrepancies with current MAR  o Patient IS taking the following which was not ordered upon admit  o Theophylline 200 mg 12 hr tab Qdaily    o Patient IS NOT taking the following which was ordered upon admit  o Baclofen 5 mg TID PRN muscle spasms  o Pyridoxine (Vit B6) 50 mg PO Qdaily  o Fluticasone-vilanterol  100-25 mcg Qdaily (Pt does not take fluticasone-salmeterol 500-50 at home)    Medications Prior to Admission   Medication Sig Dispense Refill Last Dose    albuterol-ipratropium (DUO-NEB) 2.5 mg-0.5 mg/3 mL nebulizer solution Take 3 mLs by nebulization every 4 (four) hours. Rescue       aspirin (ECOTRIN) 81 MG EC tablet Take 1 tablet (81 mg total) by mouth once daily. 30 tablet 11 9/11/2018 at Unknown time    clopidogrel (PLAVIX) 75 mg tablet Take 75 mg by mouth once daily.   9/11/2018 at Unknown time    diltiaZEM (CARDIZEM CD) 180 MG 24 hr capsule Take 180 mg by mouth once daily.   9/11/2018 at Unknown time    DULoxetine (CYMBALTA) 60 MG capsule Take 60 mg by mouth once daily.   9/11/2018 at Unknown time    enoxaparin (LOVENOX) 40 mg/0.4 mL Syrg Inject 40 mg into the skin once daily.       furosemide (LASIX) 40 MG tablet Take 40 mg by mouth once daily.    9/11/2018 at Unknown time    insulin aspart U-100 (NOVOLOG) 100 unit/mL injection Inject 0-5 Units into the skin 4 (four) times daily as needed for High Blood Sugar.       insulin detemir U-100 (LEVEMIR) 100 unit/mL injection Inject 10 Units into the skin 2 (two) times daily.    Unknown at Unknown time    Lactobacillus acidophilus 1 billion cell Cap Take 1 tablet by mouth " 2 (two) times daily.       losartan (COZAAR) 100 MG tablet Take 100 mg by mouth once daily.    9/11/2018 at Unknown time    megestrol (MEGACE) 40 MG Tab Take 40 mg by mouth 2 (two) times daily.       ondansetron (ZOFRAN) 4 MG tablet Take 1 tablet (4 mg total) by mouth every 6 (six) hours as needed. 30 tablet 1 Past Week at Unknown time    pantoprazole (PROTONIX) 40 MG tablet Take 40 mg by mouth once daily.       prazosin (MINIPRESS) 5 MG capsule Take 5 mg by mouth 2 (two) times daily.    9/11/2018 at Unknown time    predniSONE (DELTASONE) 10 MG tablet Take 10 mg by mouth 2 (two) times daily.    9/11/2018 at Unknown time    senna-docusate 8.6-50 mg (SENNA WITH DOCUSATE SODIUM) 8.6-50 mg per tablet Take 1 tablet by mouth 2 (two) times daily.       simethicone 80 mg Tab Take 160 mg by mouth every 6 (six) hours as needed for Flatulence.       simvastatin (ZOCOR) 40 MG tablet Take 40 mg by mouth every evening.   9/11/2018 at Unknown time    theophylline (THEODUR) 200 MG 12 hr tablet Take 200 mg by mouth once daily.    9/11/2018 at Unknown time         Please address this information as you see fit.  Feel free to contact us if you have any questions or require assistance.    Wilder Gallagher, PharmD  EXT [phone]                  .    .

## 2018-09-25 NOTE — ASSESSMENT & PLAN NOTE
Cr 1.4 on admit. Baseline 0.8. Likely pre-renal 2/2 poor intake in setting of AMS  Trending 1.4 --> 0.9 -> 1.0  - UA NAD  - +2.9L/24 hrs  - Patient refusing PO intake, family expressing concern pt not drinking  - Have started low dose maintenance IVF    9/25 - Cr stabilized on low dose maintenance IVF thus far

## 2018-09-25 NOTE — PT/OT/SLP PROGRESS
"Speech Language Pathology Treatment    Patient Name:  Sheryl Martines   MRN:  [id number]  Admitting Diagnosis: Altered mental status    Recommendations:                 General Recommendations:  Dysphagia therapy  Diet recommendations:  NPO, Liquid Diet Level: NPO   Aspiration Precautions: Frequent oral care and Strict aspiration precautions   General Precautions: Standard, fall, aspiration, NPO, contact  Communication strategies:  none    Subjective     "That stuff is nasty."    SLP communicated with nurse prior to entering room. Nurse stated pt demonstrated difficulty with swallowing pills with liquids this morning. Nurse stated pt was holding liquids in mouth and not initiating swallow.     Pain/Comfort:  · Pain Rating 1: 0/10  · Pain Rating Post-Intervention 1: 0/10    Objective:     Has the patient been evaluated by SLP for swallowing?   Yes  Keep patient NPO? Yes   Current Respiratory Status: nasal cannula      Pt seen bedside for ongoing swallow assessment with nurse inconsistently present. Pt with labored breathing upon SLP entering room, however, denied SOB. Pt repositioned for appropriate positioning for po take. Of note, pt's O2 sats inconsistent prior to po intake (60%-100%). SLP obtained second monitor to ensure accuracy of O2 readings. Prior to any po trials, pt's O2 sats were 100%. Pt offered and accepted po trials of ice chips X2, nectar thick liquids via teaspoon X3, and honey thick liquids via teaspoon X1. Pt with immediate drop in O2 sats from 100% to 93% with ice chips X2. In addition, pt aphonic after 2nd ice chip trial. Pt demonstrated delayed cough on 3rd trial of nectar thick liquid with eventual expectoration of mucous. In addition, pt's O2 sats dropped to 95% after nectar thick liquids X1. Pt demonstrated immediate throat clear with honey thick liquids. Pt refused trials of puree at this time, thus, unable to assess how pt would tolerate medications crushed in puree at bedside. No further " trials administered. Pt educated on role of SLP, strict aspiration precautions, s/s of aspiration, NPO and POC. Pt did not demonstrate learning at this time. Pt left in NAD. No further questions.   SLP recommending NPO at this time with strict aspiration precautions 2/2 signs of aspiration noted at this date. SLP communicated findings with team and nurse at conclusion of session, who all were in agreement with recommendations. If PO medications must be given, safest recommendations at this time would be medications crushed in puree with STRICT monitoring of O2 sats. However, as mentioned above, pt refused po trials of puree, thus unable to rule out aspiration with puree at this time.   Whiteboard updated.     Assessment:     Sheryl Martines is a 63 y.o. female with an SLP diagnosis of Dysphagia.     Goals:   Multidisciplinary Problems     SLP Goals        Problem: SLP Goal    Goal Priority Disciplines Outcome   SLP Goal     SLP Revised   Description:  Speech Language Pathology Goals  Goals expected to be met by 10/2:   1) Pt will undergo further swallow assessment to determine safest yet least restrictive diet.   2) Education on compensatory strategies, s/s of aspiration, aspiration precautions and role of SLP.     Goals expected to be met by 10/1  1. Pt will tolerate puree & thin liquids without s/s aspiration  2. Pt will tolerate trials of solids to determine when appropriate for diet upgrade                     Plan:     · Patient to be seen:  5 x/week   · Plan of Care expires:  10/23/18  · Plan of Care reviewed with:  patient   · SLP Follow-Up:  Yes       Discharge recommendations:  nursing facility, skilled       Time Tracking:     SLP Treatment Date:   09/25/18  Speech Start Time:  1231  Speech Stop Time:  1317     Speech Total Time (min):  46 min    Billable Minutes: Treatment Swallowing Dysfunction 46     María Elena Gross M.S., CCC-SLP  Speech Language Pathologist  [phone] - pager    9/25/2018      María Elena Gross, MS CCC-SLP  09/25/2018

## 2018-09-25 NOTE — PLAN OF CARE
Problem: SLP Goal  Goal: SLP Goal  Speech Language Pathology Goals  Goals expected to be met by 10/2:   1) Pt will undergo further swallow assessment to determine safest yet least restrictive diet.   2) Education on compensatory strategies, s/s of aspiration, aspiration precautions and role of SLP.     Goals expected to be met by 10/1  1. Pt will tolerate puree & thin liquids without s/s aspiration  2. Pt will tolerate trials of solids to determine when appropriate for diet upgrade   Outcome: Revised    Pt seen for ongoing swallow assessment. SLP recommending NPO at this time with strict aspiration precautions. ST to continue to follow according to POC.     María Elena Gross M.S., CCC-SLP  Speech Language Pathologist  [phone] - pager   9/25/2018

## 2018-09-26 LAB
ALBUMIN SERPL BCP-MCNC: 2.3 G/DL
ALLENS TEST: ABNORMAL
ALP SERPL-CCNC: 80 U/L
ALT SERPL W/O P-5'-P-CCNC: 19 U/L
ANION GAP SERPL CALC-SCNC: 8 MMOL/L
AST SERPL-CCNC: 16 U/L
BASOPHILS # BLD AUTO: 0.07 K/UL
BASOPHILS NFR BLD: 0.7 %
BILIRUB SERPL-MCNC: 0.5 MG/DL
BUN SERPL-MCNC: 11 MG/DL
CALCIUM SERPL-MCNC: 8.7 MG/DL
CHLORIDE SERPL-SCNC: 106 MMOL/L
CO2 SERPL-SCNC: 29 MMOL/L
CREAT SERPL-MCNC: 0.9 MG/DL
DELSYS: ABNORMAL
DIFFERENTIAL METHOD: ABNORMAL
EOSINOPHIL # BLD AUTO: 0.2 K/UL
EOSINOPHIL NFR BLD: 2.3 %
EP: 5
ERYTHROCYTE [DISTWIDTH] IN BLOOD BY AUTOMATED COUNT: 18.2 %
ERYTHROCYTE [SEDIMENTATION RATE] IN BLOOD BY WESTERGREN METHOD: 17 MM/H
ERYTHROCYTE [SEDIMENTATION RATE] IN BLOOD BY WESTERGREN METHOD: 20 MM/H
ERYTHROCYTE [SEDIMENTATION RATE] IN BLOOD BY WESTERGREN METHOD: 20 MM/H
EST. GFR  (AFRICAN AMERICAN): >60 ML/MIN/1.73 M^2
EST. GFR  (NON AFRICAN AMERICAN): >60 ML/MIN/1.73 M^2
ESTIMATED AVG GLUCOSE: 137 MG/DL
FIO2: 21
FIO2: 28
FIO2: 45
FLOW: 4
GLUCOSE SERPL-MCNC: 180 MG/DL
HBA1C MFR BLD HPLC: 6.4 %
HCO3 UR-SCNC: 31.3 MMOL/L (ref 24–28)
HCO3 UR-SCNC: 31.4 MMOL/L (ref 24–28)
HCO3 UR-SCNC: 33.2 MMOL/L (ref 24–28)
HCO3 UR-SCNC: 35.2 MMOL/L (ref 24–28)
HCT VFR BLD AUTO: 39.1 %
HCT VFR BLD CALC: 32 %PCV (ref 36–54)
HGB BLD-MCNC: 11 G/DL
IMM GRANULOCYTES # BLD AUTO: 0.05 K/UL
IMM GRANULOCYTES NFR BLD AUTO: 0.5 %
IP: 12
IP: 15
IP: 15
LACTATE SERPL-SCNC: 0.8 MMOL/L
LYMPHOCYTES # BLD AUTO: 3.7 K/UL
LYMPHOCYTES NFR BLD: 35.7 %
MAGNESIUM SERPL-MCNC: 1.8 MG/DL
MCH RBC QN AUTO: 26.2 PG
MCHC RBC AUTO-ENTMCNC: 28.1 G/DL
MCV RBC AUTO: 93 FL
MIN VOL: 11
MIN VOL: 6.6
MIN VOL: 7.4
MODE: ABNORMAL
MONOCYTES # BLD AUTO: 0.8 K/UL
MONOCYTES NFR BLD: 7.7 %
NEUTROPHILS # BLD AUTO: 5.5 K/UL
NEUTROPHILS NFR BLD: 53.1 %
NRBC BLD-RTO: 0 /100 WBC
PCO2 BLDA: 49.4 MMHG (ref 35–45)
PCO2 BLDA: 58.3 MMHG (ref 35–45)
PCO2 BLDA: 68.7 MMHG (ref 35–45)
PCO2 BLDA: 68.8 MMHG (ref 35–45)
PH SMN: 7.29 [PH] (ref 7.35–7.45)
PH SMN: 7.32 [PH] (ref 7.35–7.45)
PH SMN: 7.34 [PH] (ref 7.35–7.45)
PH SMN: 7.41 [PH] (ref 7.35–7.45)
PHOSPHATE SERPL-MCNC: 3.3 MG/DL
PLATELET # BLD AUTO: 213 K/UL
PMV BLD AUTO: 9.6 FL
PO2 BLDA: 117 MMHG (ref 80–100)
PO2 BLDA: 143 MMHG (ref 80–100)
PO2 BLDA: 173 MMHG (ref 80–100)
PO2 BLDA: 79 MMHG (ref 80–100)
POC BE: 6 MMOL/L
POC BE: 7 MMOL/L
POC BE: 7 MMOL/L
POC BE: 9 MMOL/L
POC IONIZED CALCIUM: 1.27 MMOL/L (ref 1.06–1.42)
POC SATURATED O2: 96 % (ref 95–100)
POC SATURATED O2: 98 % (ref 95–100)
POC SATURATED O2: 99 % (ref 95–100)
POC SATURATED O2: 99 % (ref 95–100)
POC TCO2: 33 MMOL/L (ref 23–27)
POC TCO2: 33 MMOL/L (ref 23–27)
POC TCO2: 35 MMOL/L (ref 23–27)
POC TCO2: 37 MMOL/L (ref 23–27)
POCT GLUCOSE: 124 MG/DL (ref 70–110)
POCT GLUCOSE: 134 MG/DL (ref 70–110)
POCT GLUCOSE: 146 MG/DL (ref 70–110)
POCT GLUCOSE: 179 MG/DL (ref 70–110)
POTASSIUM BLD-SCNC: 3.5 MMOL/L (ref 3.5–5.1)
POTASSIUM SERPL-SCNC: 3.3 MMOL/L
PROCALCITONIN SERPL IA-MCNC: 0.27 NG/ML
PROT SERPL-MCNC: 5.2 G/DL
PROVIDER CREDENTIALS: ABNORMAL
PROVIDER NOTIFIED: ABNORMAL
RBC # BLD AUTO: 4.2 M/UL
SAMPLE: ABNORMAL
SITE: ABNORMAL
SODIUM BLD-SCNC: 142 MMOL/L (ref 136–145)
SODIUM SERPL-SCNC: 143 MMOL/L
SP02: 100
SP02: 100
SP02: 96
SP02: 99
SPONT RATE: 17
SPONT RATE: 17
SPONT RATE: 20
VANCOMYCIN TROUGH SERPL-MCNC: 11 UG/ML
WBC # BLD AUTO: 10.3 K/UL

## 2018-09-26 PROCEDURE — 63600175 PHARM REV CODE 636 W HCPCS: Performed by: STUDENT IN AN ORGANIZED HEALTH CARE EDUCATION/TRAINING PROGRAM

## 2018-09-26 PROCEDURE — 18500000 HC LEAVE OF ABSENCE HOSPITAL SERVICES

## 2018-09-26 PROCEDURE — 27000190 HC CPAP FULL FACE MASK W/VALVE

## 2018-09-26 PROCEDURE — 80053 COMPREHEN METABOLIC PANEL: CPT

## 2018-09-26 PROCEDURE — 27000221 HC OXYGEN, UP TO 24 HOURS

## 2018-09-26 PROCEDURE — 95951 PR EEG MONITORING/VIDEORECORD: CPT | Mod: 26,,, | Performed by: PSYCHIATRY & NEUROLOGY

## 2018-09-26 PROCEDURE — 87040 BLOOD CULTURE FOR BACTERIA: CPT

## 2018-09-26 PROCEDURE — 20600001 HC STEP DOWN PRIVATE ROOM

## 2018-09-26 PROCEDURE — 82803 BLOOD GASES ANY COMBINATION: CPT

## 2018-09-26 PROCEDURE — 84100 ASSAY OF PHOSPHORUS: CPT

## 2018-09-26 PROCEDURE — 25000003 PHARM REV CODE 250: Performed by: STUDENT IN AN ORGANIZED HEALTH CARE EDUCATION/TRAINING PROGRAM

## 2018-09-26 PROCEDURE — 94640 AIRWAY INHALATION TREATMENT: CPT

## 2018-09-26 PROCEDURE — 83036 HEMOGLOBIN GLYCOSYLATED A1C: CPT

## 2018-09-26 PROCEDURE — 63600175 PHARM REV CODE 636 W HCPCS: Mod: JG | Performed by: STUDENT IN AN ORGANIZED HEALTH CARE EDUCATION/TRAINING PROGRAM

## 2018-09-26 PROCEDURE — 84145 PROCALCITONIN (PCT): CPT

## 2018-09-26 PROCEDURE — 83605 ASSAY OF LACTIC ACID: CPT

## 2018-09-26 PROCEDURE — 94660 CPAP INITIATION&MGMT: CPT

## 2018-09-26 PROCEDURE — 99233 SBSQ HOSP IP/OBS HIGH 50: CPT | Mod: ,,, | Performed by: HOSPITALIST

## 2018-09-26 PROCEDURE — 94761 N-INVAS EAR/PLS OXIMETRY MLT: CPT

## 2018-09-26 PROCEDURE — 36600 WITHDRAWAL OF ARTERIAL BLOOD: CPT

## 2018-09-26 PROCEDURE — 63700000 PHARM REV CODE 250 ALT 637 W/O HCPCS: Performed by: STUDENT IN AN ORGANIZED HEALTH CARE EDUCATION/TRAINING PROGRAM

## 2018-09-26 PROCEDURE — 80202 ASSAY OF VANCOMYCIN: CPT

## 2018-09-26 PROCEDURE — 25000242 PHARM REV CODE 250 ALT 637 W/ HCPCS: Performed by: STUDENT IN AN ORGANIZED HEALTH CARE EDUCATION/TRAINING PROGRAM

## 2018-09-26 PROCEDURE — 83735 ASSAY OF MAGNESIUM: CPT

## 2018-09-26 PROCEDURE — 99900035 HC TECH TIME PER 15 MIN (STAT)

## 2018-09-26 PROCEDURE — 36415 COLL VENOUS BLD VENIPUNCTURE: CPT

## 2018-09-26 PROCEDURE — 85025 COMPLETE CBC W/AUTO DIFF WBC: CPT

## 2018-09-26 RX ORDER — METHYLPREDNISOLONE SOD SUCC 125 MG
125 VIAL (EA) INJECTION ONCE
Status: COMPLETED | OUTPATIENT
Start: 2018-09-26 | End: 2018-09-26

## 2018-09-26 RX ORDER — IPRATROPIUM BROMIDE AND ALBUTEROL SULFATE 2.5; .5 MG/3ML; MG/3ML
3 SOLUTION RESPIRATORY (INHALATION) EVERY 4 HOURS
Status: DISCONTINUED | OUTPATIENT
Start: 2018-09-27 | End: 2018-09-30

## 2018-09-26 RX ORDER — POTASSIUM CHLORIDE 7.45 MG/ML
10 INJECTION INTRAVENOUS
Status: COMPLETED | OUTPATIENT
Start: 2018-09-26 | End: 2018-09-27

## 2018-09-26 RX ORDER — LEVETIRACETAM 15 MG/ML
1500 INJECTION INTRAVASCULAR EVERY 12 HOURS
Status: DISCONTINUED | OUTPATIENT
Start: 2018-09-26 | End: 2018-09-26

## 2018-09-26 RX ORDER — IPRATROPIUM BROMIDE AND ALBUTEROL SULFATE 2.5; .5 MG/3ML; MG/3ML
3 SOLUTION RESPIRATORY (INHALATION)
Status: DISCONTINUED | OUTPATIENT
Start: 2018-09-26 | End: 2018-09-26

## 2018-09-26 RX ORDER — POTASSIUM CHLORIDE 20 MEQ/1
40 TABLET, EXTENDED RELEASE ORAL 2 TIMES DAILY
Status: DISCONTINUED | OUTPATIENT
Start: 2018-09-26 | End: 2018-09-26

## 2018-09-26 RX ADMIN — ALTEPLASE 2 MG: 2.2 INJECTION, POWDER, LYOPHILIZED, FOR SOLUTION INTRAVENOUS at 11:09

## 2018-09-26 RX ADMIN — IPRATROPIUM BROMIDE AND ALBUTEROL SULFATE 3 ML: .5; 3 SOLUTION RESPIRATORY (INHALATION) at 01:09

## 2018-09-26 RX ADMIN — METHYLPREDNISOLONE SODIUM SUCCINATE 125 MG: 125 INJECTION, POWDER, FOR SOLUTION INTRAMUSCULAR; INTRAVENOUS at 12:09

## 2018-09-26 RX ADMIN — POTASSIUM CHLORIDE 10 MEQ: 7.46 INJECTION, SOLUTION INTRAVENOUS at 08:09

## 2018-09-26 RX ADMIN — HEPARIN SODIUM 5000 UNITS: 5000 INJECTION, SOLUTION INTRAVENOUS; SUBCUTANEOUS at 05:09

## 2018-09-26 RX ADMIN — HEPARIN SODIUM 5000 UNITS: 5000 INJECTION, SOLUTION INTRAVENOUS; SUBCUTANEOUS at 02:09

## 2018-09-26 RX ADMIN — PIPERACILLIN SODIUM AND TAZOBACTAM SODIUM 4.5 G: 4; .5 INJECTION, POWDER, LYOPHILIZED, FOR SOLUTION INTRAVENOUS at 08:09

## 2018-09-26 RX ADMIN — POTASSIUM CHLORIDE 10 MEQ: 7.46 INJECTION, SOLUTION INTRAVENOUS at 11:09

## 2018-09-26 RX ADMIN — HEPARIN SODIUM 5000 UNITS: 5000 INJECTION, SOLUTION INTRAVENOUS; SUBCUTANEOUS at 10:09

## 2018-09-26 RX ADMIN — INSULIN DETEMIR 7 UNITS: 100 INJECTION, SOLUTION SUBCUTANEOUS at 11:09

## 2018-09-26 RX ADMIN — POTASSIUM CHLORIDE 10 MEQ: 7.46 INJECTION, SOLUTION INTRAVENOUS at 06:09

## 2018-09-26 RX ADMIN — PRAZOSIN HYDROCHLORIDE 5 MG: 5 CAPSULE ORAL at 11:09

## 2018-09-26 RX ADMIN — PIPERACILLIN SODIUM AND TAZOBACTAM SODIUM 4.5 G: 4; .5 INJECTION, POWDER, LYOPHILIZED, FOR SOLUTION INTRAVENOUS at 02:09

## 2018-09-26 RX ADMIN — METHYLPREDNISOLONE SODIUM SUCCINATE 60 MG: 40 INJECTION, POWDER, FOR SOLUTION INTRAMUSCULAR; INTRAVENOUS at 11:09

## 2018-09-26 RX ADMIN — POTASSIUM CHLORIDE 10 MEQ: 7.46 INJECTION, SOLUTION INTRAVENOUS at 09:09

## 2018-09-26 NOTE — PT/OT/SLP PROGRESS
Speech Language Pathology      Sheryl Martines  MRN: [id number]    Patient not seen today secondary to RN hold - calling rapid response. Will follow-up at next scheduled treatment date pending patient appropriateness.     Marta Soto M.S., CCC-SLP  Speech Language Pathologist  [phone]  09/26/2018

## 2018-09-26 NOTE — NURSING
"Pt arrived to floor w/ continuous bipap in use, FIO2 50%, RR 13. Transferred from observation after acute mental status change and rapid responses x2. CT of head completed. ABG CO2 levels improving with Bipap. /70. Pt responding to her name and gentle shaking. Able to respond to simple questions such as "Are you breathing ok?". Following commands with encouragement. Able to move all extremities and suspend them without drift. Telemetry and continuous pulse ox initiated. SPO2 100% on bipap. Contact isolation for MRSA @ recent site of appentdectomy initiated. Pt to remain NPO except for meds w/sips of water and sparing ice chips. Primary team notified of pt's arrival to floor.  "

## 2018-09-26 NOTE — PLAN OF CARE
Problem: Fall Risk (Adult)  Goal: Identify Related Risk Factors and Signs and Symptoms  Related risk factors and signs and symptoms are identified upon initiation of Human Response Clinical Practice Guideline (CPG)  Outcome: Ongoing (interventions implemented as appropriate)   09/26/18 1500   Fall Risk   Related Risk Factors (Fall Risk) environment unfamiliar;objects hard to reach;polypharmacy;inadequate lighting;gait/mobility problems;fatigue/slow reaction;slipper/uneven surfaces     Fall precautions in place. No falls occurred during shift.     Problem: Pressure Ulcer Risk (Finn Scale) (Adult,Obstetrics,Pediatric)  Goal: Identify Related Risk Factors and Signs and Symptoms  Related risk factors and signs and symptoms are identified upon initiation of Human Response Clinical Practice Guideline (CPG)  Outcome: Ongoing (interventions implemented as appropriate)     09/26/18 1500   Pressure Ulcer Risk (Finn Scale)   Related Risk Factors (Pressure Ulcer Risk (Finn Scale)) mobility impaired;cognitive impairment;tissue perfusion altered     No new skin concerns present this shift. Patient's position is being adjusted q. 2hrs.

## 2018-09-26 NOTE — PROGRESS NOTES
Neterrencehiro Stone, pt's daughter, has been notified by voicemail of new room assignment:  [id number].

## 2018-09-26 NOTE — PROGRESS NOTES
[facility] Hospital Medicine  Progress Note    Patient Name: Sheryl Martines  MRN: [id number]  Patient Class: IP- Inpatient   Admission Date: 9/23/2018  Length of Stay: 0 days  Attending Physician: Keyla Martell MD  Primary Care Provider: Primary Doctor [facility] Medicine Team: [facility]OSP MED 1 Wayne Morris MD    Subjective:     Principal Problem:Altered mental status    HPI:  Ms. Martines is a 63-year-old female with recent appendicitis s/p appendectomy, COPD, IDDM, CAD, pontine stroke (2012) presents from SNF for intermittent altered mental status (per SNF) and lethargy of 2 days. Patient denies AMS herself and is attributing drowsiness to pain medications.   She also c/o loose stool and dizziness. She denied fever, chills, headache, chest pain, SOB, nausea, vomiting, dysuria.    Patient currently living in rehab facility for reconditioning and treatment of wound infection s/p appendectomy on 8/26. On previous admission, she presented with slurred speech and AMS. EEG was done at the time and did not show any seizure activity. She found to have appendicitis with peritonitis, and underwent open appendectomy. Her abdominal wound cultures positive for  MRSA, enterococcus, & e. coli.  Also positive for c. diff during admission. She was discharged with PO vanc (stop Sept 13), clinda (1 week), and flagyl (stop Sept 13) per ID/ surgery.    At SNF, she was on Ertapenem (started 9/20) and Vancomycin (started 9/21) until this transfered to ED for present admission.      Hospital Course:  9/25 General surgery consulted and do not believe pt has current surgical complication or secondary infection. Open appy wound site is now healed and not infected at present, intrabdominal RLQ pelvic collection (believed to be hematoma or seroma) is shrinking on abdo CT comparison to previous imaging and not convincing for infection given no WCC or fever per gen surg. However pt does continue to have intermittent  tachycardia to ~115 but is hemodynamically stable with -170s. CT-H is negative for acute intracranial process. Blood cultures are NGTD. Pt mental status still not at baseline per daughter. Is responsive and AAOx3, but poor concentration and easily distractible with poor speech articulation. Opioids have been held due to concern for delirium. Made NPO today per SLP recs due to concern for aspiration risk, continue to assess    Pt is being continued on Zosyn and Vanc despite no clear infectious source isolated due to AMS in setting of pelvic collection, ID recs to continue current Abs until pelvic collection has cleared as confirmed on followup imaging.     Interval History:  - NAEO  - Pt agitated, poor concentration. AAOx3  - Pt SBPs 150-170s, remains afebrile  - New onset asymptomatic afib 9/24 AM     Past Medical History:   Diagnosis Date    Asthma     Closed compression fracture of fourth lumbar vertebra     COPD (chronic obstructive pulmonary disease)     Coronary artery disease     Diabetes mellitus     Glaucoma     High cholesterol     Hypertension     Iritis     Pulmonary embolus     Stroke     rt sided weakness.       Past Surgical History:   Procedure Laterality Date    ABDOMINAL SURGERY      APPENDECTOMY N/A 8/26/2018    Procedure: APPENDECTOMY;  Surgeon: Bernadine Melendrez MD;  Location: [facility] OR;  Service: General;  Laterality: N/A;    APPENDECTOMY N/A 8/26/2018    Performed by Bernadine Melendrez MD at [facility] OR    APPENDECTOMY, LAPAROSCOPIC---CONVERTED TO OPEN APPENDECTOMY @0950 N/A 8/26/2018    Performed by Bernadine Melendrez MD at [facility] OR    BACK SURGERY      stimulator    CATARACT EXTRACTION      HYSTERECTOMY      LAPAROSCOPIC APPENDECTOMY N/A 8/26/2018    Procedure: APPENDECTOMY, LAPAROSCOPIC---CONVERTED TO OPEN APPENDECTOMY @0950;  Surgeon: Bernadine Melendrez MD;  Location: [facility] OR;  Service: General;  Laterality: N/A;       Review of patient's allergies indicates:    Allergen Reactions    Ace inhibitors Swelling    Corticosteroids (glucocorticoids)     Hydralazine analogues     Tetracyclines Swelling    Travatan (with benzalkonium) [travoprost (benzalkonium)]        Current Facility-Administered Medications on File Prior to Encounter   Medication    [MAR Hold - Suspended Admission] acetaminophen tablet 650 mg    [MAR Hold - Suspended Admission] acetaminophen tablet 650 mg    [MAR Hold - Suspended Admission] albuterol-ipratropium 2.5 mg-0.5 mg/3 mL nebulizer solution 3 mL    [MAR Hold - Suspended Admission] aspirin EC tablet 81 mg    [MAR Hold - Suspended Admission] calcium carbonate 200 mg calcium (500 mg) chewable tablet 500 mg    [MAR Hold - Suspended Admission] clopidogrel tablet 75 mg    [MAR Hold - Suspended Admission] dextrose 50% injection 12.5 g    [MAR Hold - Suspended Admission] dextrose 50% injection 25 g    [MAR Hold - Suspended Admission] diltiaZEM 24 hr capsule 180 mg    [MAR Hold - Suspended Admission] DULoxetine DR capsule 60 mg    [MAR Hold - Suspended Admission] enoxaparin injection 40 mg    [MAR Hold - Suspended Admission] ertapenem (INVANZ) 1 g in sodium chloride 0.9 % 100 mL IVPB (ready to mix system)    [MAR Hold - Suspended Admission] furosemide tablet 40 mg    [MAR Hold - Suspended Admission] glucagon (human recombinant) injection 1 mg    [MAR Hold - Suspended Admission] glucose chewable tablet 16 g    [MAR Hold - Suspended Admission] glucose chewable tablet 24 g    [MAR Hold - Suspended Admission] insulin aspart U-100 pen 0-5 Units    [MAR Hold - Suspended Admission] insulin detemir U-100 pen 10 Units    [MAR Hold - Suspended Admission] Lactobacillus rhamnosus GG capsule 1 capsule    [MAR Hold - Suspended Admission] losartan tablet 100 mg    [MAR Hold - Suspended Admission] ondansetron disintegrating tablet 8 mg    [MAR Hold - Suspended Admission] ondansetron tablet 4 mg    [MAR Hold - Suspended Admission] pantoprazole EC  tablet 40 mg    [MAR Hold - Suspended Admission] prazosin capsule 5 mg    [MAR Hold - Suspended Admission] predniSONE tablet 10 mg    [MAR Hold - Suspended Admission] ramelteon tablet 8 mg    [MAR Hold - Suspended Admission] senna-docusate 8.6-50 mg per tablet 1 tablet    [MAR Hold - Suspended Admission] simethicone chewable tablet 160 mg    [MAR Hold - Suspended Admission] simvastatin tablet 40 mg    [MAR Hold - Suspended Admission] theophylline 24 hr capsule 200 mg    [MAR Hold - Suspended Admission] vancomycin 750 mg in dextrose 5 % 250 mL IVPB (ready to mix system)    [MAR Hold - Suspended Admission] zinc oxide 20 % ointment     Current Outpatient Medications on File Prior to Encounter   Medication Sig    albuterol-ipratropium (DUO-NEB) 2.5 mg-0.5 mg/3 mL nebulizer solution Take 3 mLs by nebulization every 4 (four) hours. Rescue    aspirin (ECOTRIN) 81 MG EC tablet Take 1 tablet (81 mg total) by mouth once daily.    clopidogrel (PLAVIX) 75 mg tablet Take 75 mg by mouth once daily.    diltiaZEM (CARDIZEM CD) 180 MG 24 hr capsule Take 180 mg by mouth once daily.    DULoxetine (CYMBALTA) 60 MG capsule Take 60 mg by mouth once daily.    enoxaparin (LOVENOX) 40 mg/0.4 mL Syrg Inject 40 mg into the skin once daily.    furosemide (LASIX) 40 MG tablet Take 40 mg by mouth once daily.     insulin aspart U-100 (NOVOLOG) 100 unit/mL injection Inject 0-5 Units into the skin 4 (four) times daily as needed for High Blood Sugar.    insulin detemir U-100 (LEVEMIR) 100 unit/mL injection Inject 10 Units into the skin 2 (two) times daily.     Lactobacillus acidophilus 1 billion cell Cap Take 1 tablet by mouth 2 (two) times daily.    losartan (COZAAR) 100 MG tablet Take 100 mg by mouth once daily.     megestrol (MEGACE) 40 MG Tab Take 40 mg by mouth 2 (two) times daily.    ondansetron (ZOFRAN) 4 MG tablet Take 1 tablet (4 mg total) by mouth every 6 (six) hours as needed.    pantoprazole (PROTONIX) 40 MG tablet  Take 40 mg by mouth once daily.    prazosin (MINIPRESS) 5 MG capsule Take 5 mg by mouth 2 (two) times daily.     predniSONE (DELTASONE) 10 MG tablet Take 10 mg by mouth 2 (two) times daily.     senna-docusate 8.6-50 mg (SENNA WITH DOCUSATE SODIUM) 8.6-50 mg per tablet Take 1 tablet by mouth 2 (two) times daily.    simethicone 80 mg Tab Take 160 mg by mouth every 6 (six) hours as needed for Flatulence.    simvastatin (ZOCOR) 40 MG tablet Take 40 mg by mouth every evening.    theophylline (THEODUR) 200 MG 12 hr tablet Take 200 mg by mouth once daily.      Family History     Problem Relation (Age of Onset)    Cancer Father    Diabetes Brother        Tobacco Use    Smoking status: Never Smoker   Substance and Sexual Activity    Alcohol use: No     Frequency: Never    Drug use: No    Sexual activity: No     Partners: Male     Review of Systems   Constitutional: Positive for activity change and fatigue. Negative for appetite change, chills, diaphoresis and fever.   HENT: Positive for trouble swallowing (pills). Negative for facial swelling and sore throat.    Eyes: Negative for photophobia, pain and visual disturbance.   Respiratory: Negative for cough, chest tightness, shortness of breath and wheezing.    Cardiovascular: Negative for chest pain, palpitations and leg swelling.   Gastrointestinal: Positive for abdominal pain and diarrhea. Negative for blood in stool, constipation, nausea and vomiting.   Genitourinary: Negative for difficulty urinating, dysuria and flank pain.   Musculoskeletal: Positive for arthralgias, back pain and gait problem. Negative for neck pain and neck stiffness.   Skin: Positive for wound (Surgical scar, RLQ).   Neurological: Positive for dizziness and weakness. Negative for syncope, numbness and headaches.   Psychiatric/Behavioral: Positive for confusion and decreased concentration. Negative for hallucinations. The patient is not nervous/anxious.      Objective:     Vital Signs (Most  Recent):  Temp: 98 °F (36.7 °C) (09/25/18 1721)  Pulse: 95 (09/25/18 1721)  Resp: 20 (09/25/18 1721)  BP: (!) 159/78 (09/25/18 1721)  SpO2: 100 % (09/25/18 1721) Vital Signs (24h Range):  Temp:  [97.1 °F (36.2 °C)-98 °F (36.7 °C)] 98 °F (36.7 °C)  Pulse:  [] 95  Resp:  [20-34] 20  SpO2:  [99 %-100 %] 100 %  BP: (157-177)/(70-78) 159/78     Weight: 74.6 kg (164 lb 6.4 oz)  Body mass index is 32.11 kg/m².    Physical Exam   Constitutional: She is oriented to person, place, and time. She appears well-developed and well-nourished. No distress.   HENT:   Head: Normocephalic and atraumatic.   Mouth/Throat: Oropharynx is clear and moist.   Eyes: Conjunctivae are normal. No scleral icterus.   Neck: Normal range of motion. Neck supple.   Cardiovascular: Normal heart sounds and intact distal pulses.   Tachycardia to 105, irregularly irregular   Pulmonary/Chest: Effort normal and breath sounds normal. No respiratory distress. She has no wheezes. She has no rales. She exhibits no tenderness.   Abdominal: Soft. Bowel sounds are normal. There is generalized tenderness. There is no rebound and no guarding.       Musculoskeletal: She exhibits no edema, tenderness or deformity.   Spinal cord stimulator in place   Lymphadenopathy:     She has no cervical adenopathy.   Neurological: She is alert and oriented to person, place, and time.   Intermittent periods of lethargy & confusion during exam   Skin: Skin is warm and dry. She is not diaphoretic.   Psychiatric: Her speech is rapid and/or pressured and slurred. She is not agitated and not combative.   Distractible, poor concentration, does not like to open eyes although she is alert, orientedx3 She is inattentive.           Significant Labs:   A1C:   Recent Labs   Lab  08/23/18   0217   HGBA1C  8.1*     ABGs:   No results for input(s): PH, PCO2, HCO3, POCSATURATED, BE, TOTALHB, COHB, METHB, O2HB, POCFIO2 in the last 48 hours.  Blood Culture:   No results for input(s): LABBLOO in  the last 48 hours.  CBC:   No results for input(s): WBC, HGB, HCT, PLT in the last 48 hours.  CMP:   Recent Labs   Lab  09/24/18   0722  09/25/18   0544   NA  140  143   K  3.7  4.4   CL  108  108   CO2  26  24   GLU  109  129*   BUN  12  12   CREATININE  0.9  1.0   CALCIUM  8.6*  9.3   PROT  5.8*  6.2   ALBUMIN  2.6*  2.7*   BILITOT  0.5  0.5   ALKPHOS  82  90   AST  19  28   ALT  19  22   ANIONGAP  6*  11   EGFRNONAA  >60.0  >60.0     Lactic Acid:   No results for input(s): LACTATE in the last 48 hours.    Significant Imaging: I have reviewed all pertinent imaging results/findings within the past 24 hours.    Assessment/Plan:      * Altered mental status    Pt presents from SNF with AMS and abdo pain (although pt denies AMS and abdominal pain during review of systems).  - CT head showed no acute path  - Daughter believes pt is altered also, poor speech articulation and concentration. Believes may be due to opioid medications  - Infectious workup negative thus far, opioids held but no tangible improvement in mental status    Plan:  - Started Vanc and Zosyn, continue until pelvic fluid collection has resolved on imaging follow up as per ID recs  - Low dose maintenance fluids as poor cooperation to PO intake  - Continue to hold opioids  - Delirium precautions  - Consider neurology consultation        Atrial fibrillation    AF onset 9/24 AM, will be 48hrs tomorrow morning  RVR today with HR to ~115 and -170s  - Increased dilt from 180 to 240mg today  - Currently on Heparin 5000U q8  - Contraindication for BB due to severe asthma on long term PO pred and albuterol nebs  - Prazosin 5mg and Losartan 100mg (started 9/24) for HTN  - Follow AF for more definitive medical mgmt if does not resolve spontanteously        Gastroesophageal reflux disease without esophagitis    - Pantoprazole 40 mg                     (HFpEF) heart failure with preserved ejection fraction    ESSENTIAL HYPERTENSION    Last ECHO 8/23/18  showing EF 60-65%, grade 1 diastolic dysfunction     - Lasix 40 mg daily  - Losartan 100 mg daily  - Prazosin 5 mg BID        Moderate asthma without complication    - Breo daily  - Theophylline 200 mg daily  -  Albuterol PRN  - Long term Pred 10mg od as per daughter  - Relative contraindication for beta blockers in HTN/AF mgmt given significant difficult to control asthma on daily oral pred                S/P appendectomy    Pt recently hospitalized for appendicitis with peritonitis. She had open appendectomy 8/26 with wound cultures positive for MRSA, enterococcus, and E.col. She was discharged with PO vanc (stop Sept 13), clinda (1 week), and flagyl (stop Sept 13).   At [facility] , she was on Ertapenem (started 9/20) and Vancomycin (started 9/21) until this transfered to ED for present admission    A/P per gen surg:  - Unconvinced of SBP/ acute abdomen, or surgical site infection - no WCC no fever, no signs infection at incision site  - RLQ pelvic fluid collection on CT but has been shrinking on its own when compared to previous imaging, do not recommend draining at this time. Likely hematoma or seroma  - Continue Vanc and Zosyn per ID until resolution of pelvic fluid collection on follow up imaging        Clostridium difficile infection    Recent hospitalization positive for C. Diff infection. Treated with PO vanc and flagyl.   Pt presently reporting loose, but not watery, stool    - Repeat C. Diff panel pending          NIKHIL (acute kidney injury)    Cr 1.4 on admit. Baseline 0.8. Likely pre-renal 2/2 poor intake in setting of AMS  Trending 1.4 --> 0.9 -> 1.0  - UA NAD  - +2.9L/24 hrs  - Patient refusing PO intake, family expressing concern pt not drinking  - Have started low dose maintenance IVF    9/25 - Cr stabilized on low dose maintenance IVF thus far        Closed compression fracture of fourth lumbar vertebra    Pt with chronic back pain. S/p spinal stimulator placement    - Avoiding opiates for now because of  AMS  - Baclofen PRN for pain  - Started gabapentin         CAD (coronary artery disease)    - ASA 81  - Plavix 75  - Simvastatin 40           Insulin dependent diabetes mellitus    - 7 U detemir BID  - LDSSI  - Diabetic diet  Glucose well controlled 109-163 throughout admission            VTE Risk Mitigation (From admission, onward)        Ordered     heparin, porcine (PF) 100 unit/mL injection flush 500 Units  As needed (PRN)      09/24/18 0145     heparin (porcine) injection 5,000 Units  Every 8 hours      09/23/18 2100              Wayne Morris MD  Department of Hospital Medicine   [facility]                    09/26/2018                             STAFF PHYSICIAN NOTE                                   Attending Attestation for Rounds with Resident  I have reviewed and concur with the resident's history, physical, assessment, and plan.  I have personally interviewed and examined the patient at bedside and agree with the resident's findings.                                  ________________________________________                                     REASON FOR ADMISSION:     Patient is 63 y.o.female    Body mass index is 32.11 kg/m².,  Altered mental status

## 2018-09-26 NOTE — NURSING
Report given to Ainsley on MTSU at [id number]. Patient to be transferred to MTSU after CT scan. Patient is stable. VSS. No concerns at this time.

## 2018-09-26 NOTE — ASSESSMENT & PLAN NOTE
AF onset 9/24 AM, will be 48hrs tomorrow morning  RVR today with HR to ~115 and -170s  - Increased dilt from 180 to 240mg today  - Currently on Heparin 5000U q8  - Contraindication for BB due to severe asthma on long term PO pred and albuterol nebs  - Prazosin 5mg and Losartan 100mg (started 9/24) for HTN  - Follow AF for more definitive medical mgmt if does not resolve spontanteously

## 2018-09-26 NOTE — SIGNIFICANT EVENT
Rapid Response Nurse Note     Rapid Response Metrics:     Admit Date: 2018  LOS: 1  Code Status: Full Code   Date of Consult: 2018  : 1955  Age: 63 y.o.  Weight:   Wt Readings from Last 1 Encounters:   18 74.6 kg (164 lb 6.4 oz)     Race:   Sex: female  Bed: OBS 3083/OBS 3083A:   MRN: [id number]  Time Rapid Response Team page Received: 941  Time Rapid Response Team at Bedside: 945, padma responded, ALLISON Bourgeois RN  Time Rapid Response Team left Bedside: 1030  Was the patient discharged from an ICU this admission? no   Was the patient discharged from a PACU within last 24 hours? no  Did the patient receive conscious sedation/general anesthesia within last 24 hours? no  Was the patient in the ED within the past 24 hours? no  Was the patient started on NIPPV within the past 24 hours? no  Did this progress into an ARC or CPA: no  Attending Physician: Keyla Martell MD  Primary Service: [facility]  Consult Requested By: Keyla Martell MD   Rapid Response Indication(s): altered mental status  Disposition: transfer to stepdown bed    SITUATION:     Reason for Call:   Called to evaluate the patient for Neuro    BACKGROUND:     Why is the patient in the hospital?: altered mental status  What changed?: less arousable     ASSESSMENT:     What did you find: Patient only arousable to sternal rub. ABG completed. CO2 64, patient placed on bipap, will follow up VBG     RECOMMENDATIONS:     We recommend: transfer to  Stepdown unit    FOLLOW-UP/CONTINGENCY PLAN:     Patient needs a second visit at : 1200p    Call the rapid response Nurse at x [phone] for additional questions or concerns.      PHYSICIAN ESCALATION:     Orders received and case discussed with Mara Al MD    Disposition: transfer to stepdown

## 2018-09-26 NOTE — PROGRESS NOTES
RN Proactive Rounding Note  Time of Visit: 0536    Admit Date: 2018  LOS: 1  Code Status: Full Code   Date of Visit: 2018  : 1955  Age: 63 y.o.  Sex: female  Bed: OBS 3083/OBS 3083A:   MRN: [id number]  Was the patient discharged from an ICU this admission? No   Was the patient discharged from a PACU within last 24 hours? No  Did the patient receive conscious sedation/general anesthesia in last 24 hours? No  Was the patient in the ED within the past 24 hours? No  Was the patient started on NIPPV within the past 24 hours? No  Attending Physician: Keyla Martell MD  Primary Service: [facility] 1      ASSESSMENT:     Abnormal Vital Signs: Altered mental status  Clinical Issues: Neuro     INTERVENTIONS/ RECOMMENDATIONS:     Call received from Ashley, bedside RN in regards to patient's change in mentation. Per RN, patient's mental status significantly altered from earlier in shift. Patient previously alert or responsive to voice - now requiring touch/shaking to arouse. Upon arrival to room, VS as follows: HR 65, /74, RR 20, SpO2 100%, afebrile. No acute signs of distress noted. Patient unable to state name or answer orientation questions, responding only with moans. IM1 paged - orders obtained for blood cultures, procalcitonin, and lactic acid level. Will continue to closely monitor.    Discussed plan of care with Ashley HUNT    PHYSICIAN ESCALATION:     Yes/No Yes    Orders received and case discussed with Dr Flowers     Disposition: Remain in room 3083A at this time    FOLLOW-UP/CONTINGENCY:       Call back the Rapid Response Nurse at x [phone]  for additional questions or concerns.

## 2018-09-26 NOTE — NURSING
Patient not easily arousable this AM during attempted medications administration. Patient only arousable to deep sternal rub. Otherwise, VSS. BS: 179.  Attending team notified of patient's condition this AM. Dr. Arsenio Riggs MD to report to bedside to assess patient. Will continue to monitor.

## 2018-09-26 NOTE — CARE UPDATE
Rapid Response Follow-up Note    Followed up with patient for proactive rounding.   Patients bipap removed for EEG testing, IM-1 team at bedside, critical care consulted about increase on  CO2 increase to 68.8 on ABG which has not improved on bipap.  Reviewed plan of care with primary RN   Please call Rapid Response RN with any questions or concerns at  [phone].

## 2018-09-26 NOTE — NURSING
Patient unable to swallow AM medications. Attending team notified. Orders received to keep patient NPO per attending team

## 2018-09-26 NOTE — CARE UPDATE
Rapid Response Follow-up Note    Followed up with patient. No acute issues at this time. ABG inproving with bipap. House supervisor aware of patients condition to make priority for upgrade. Cath flow instilled in port. Reviewed plan of care with primary RN.   Please call Rapid Response RN with any questions or concerns at  [phone]

## 2018-09-27 LAB
ALBUMIN SERPL BCP-MCNC: 2.5 G/DL
ALP SERPL-CCNC: 78 U/L
ALT SERPL W/O P-5'-P-CCNC: 22 U/L
ANION GAP SERPL CALC-SCNC: 9 MMOL/L
AST SERPL-CCNC: 14 U/L
BASOPHILS # BLD AUTO: 0.01 K/UL
BASOPHILS NFR BLD: 0.1 %
BILIRUB SERPL-MCNC: 0.5 MG/DL
BUN SERPL-MCNC: 18 MG/DL
C DIFF GDH STL QL: NEGATIVE
C DIFF TOX A+B STL QL IA: NEGATIVE
CALCIUM SERPL-MCNC: 9.5 MG/DL
CHLORIDE SERPL-SCNC: 105 MMOL/L
CO2 SERPL-SCNC: 27 MMOL/L
CREAT SERPL-MCNC: 0.8 MG/DL
DIFFERENTIAL METHOD: ABNORMAL
EOSINOPHIL # BLD AUTO: 0 K/UL
EOSINOPHIL NFR BLD: 0 %
ERYTHROCYTE [DISTWIDTH] IN BLOOD BY AUTOMATED COUNT: 18.2 %
EST. GFR  (AFRICAN AMERICAN): >60 ML/MIN/1.73 M^2
EST. GFR  (NON AFRICAN AMERICAN): >60 ML/MIN/1.73 M^2
GLUCOSE SERPL-MCNC: 133 MG/DL
HCT VFR BLD AUTO: 36.1 %
HGB BLD-MCNC: 10.7 G/DL
IMM GRANULOCYTES # BLD AUTO: 0.04 K/UL
IMM GRANULOCYTES NFR BLD AUTO: 0.5 %
LYMPHOCYTES # BLD AUTO: 1.4 K/UL
LYMPHOCYTES NFR BLD: 18.9 %
MAGNESIUM SERPL-MCNC: 1.9 MG/DL
MCH RBC QN AUTO: 26.2 PG
MCHC RBC AUTO-ENTMCNC: 29.6 G/DL
MCV RBC AUTO: 89 FL
MONOCYTES # BLD AUTO: 0.1 K/UL
MONOCYTES NFR BLD: 1.1 %
NEUTROPHILS # BLD AUTO: 5.9 K/UL
NEUTROPHILS NFR BLD: 79.4 %
NRBC BLD-RTO: 0 /100 WBC
PHOSPHATE SERPL-MCNC: 2.4 MG/DL
PLATELET # BLD AUTO: 242 K/UL
PMV BLD AUTO: 9.9 FL
POCT GLUCOSE: 144 MG/DL (ref 70–110)
POCT GLUCOSE: 153 MG/DL (ref 70–110)
POCT GLUCOSE: 362 MG/DL (ref 70–110)
POCT GLUCOSE: 382 MG/DL (ref 70–110)
POCT GLUCOSE: 451 MG/DL (ref 70–110)
POTASSIUM SERPL-SCNC: 4.4 MMOL/L
PROT SERPL-MCNC: 6 G/DL
RBC # BLD AUTO: 4.08 M/UL
SODIUM SERPL-SCNC: 141 MMOL/L
WBC # BLD AUTO: 7.41 K/UL

## 2018-09-27 PROCEDURE — 25000003 PHARM REV CODE 250: Performed by: STUDENT IN AN ORGANIZED HEALTH CARE EDUCATION/TRAINING PROGRAM

## 2018-09-27 PROCEDURE — 63600175 PHARM REV CODE 636 W HCPCS: Performed by: STUDENT IN AN ORGANIZED HEALTH CARE EDUCATION/TRAINING PROGRAM

## 2018-09-27 PROCEDURE — 20600001 HC STEP DOWN PRIVATE ROOM

## 2018-09-27 PROCEDURE — 18500000 HC LEAVE OF ABSENCE HOSPITAL SERVICES

## 2018-09-27 PROCEDURE — 94660 CPAP INITIATION&MGMT: CPT

## 2018-09-27 PROCEDURE — 95813 EEG EXTND MNTR 61-119 MIN: CPT | Mod: 26,,, | Performed by: PSYCHIATRY & NEUROLOGY

## 2018-09-27 PROCEDURE — 27000221 HC OXYGEN, UP TO 24 HOURS

## 2018-09-27 PROCEDURE — 63700000 PHARM REV CODE 250 ALT 637 W/O HCPCS: Performed by: STUDENT IN AN ORGANIZED HEALTH CARE EDUCATION/TRAINING PROGRAM

## 2018-09-27 PROCEDURE — 84100 ASSAY OF PHOSPHORUS: CPT

## 2018-09-27 PROCEDURE — 87449 NOS EACH ORGANISM AG IA: CPT

## 2018-09-27 PROCEDURE — 85025 COMPLETE CBC W/AUTO DIFF WBC: CPT

## 2018-09-27 PROCEDURE — 80053 COMPREHEN METABOLIC PANEL: CPT

## 2018-09-27 PROCEDURE — 94761 N-INVAS EAR/PLS OXIMETRY MLT: CPT

## 2018-09-27 PROCEDURE — 83735 ASSAY OF MAGNESIUM: CPT

## 2018-09-27 PROCEDURE — 25000242 PHARM REV CODE 250 ALT 637 W/ HCPCS: Performed by: STUDENT IN AN ORGANIZED HEALTH CARE EDUCATION/TRAINING PROGRAM

## 2018-09-27 PROCEDURE — 63600175 PHARM REV CODE 636 W HCPCS: Performed by: HOSPITALIST

## 2018-09-27 PROCEDURE — 99233 SBSQ HOSP IP/OBS HIGH 50: CPT | Mod: ,,, | Performed by: HOSPITALIST

## 2018-09-27 PROCEDURE — 99900035 HC TECH TIME PER 15 MIN (STAT)

## 2018-09-27 PROCEDURE — 25000242 PHARM REV CODE 250 ALT 637 W/ HCPCS: Performed by: HOSPITALIST

## 2018-09-27 PROCEDURE — 94640 AIRWAY INHALATION TREATMENT: CPT

## 2018-09-27 RX ORDER — INSULIN ASPART 100 [IU]/ML
10 INJECTION, SOLUTION INTRAVENOUS; SUBCUTANEOUS ONCE
Status: COMPLETED | OUTPATIENT
Start: 2018-09-27 | End: 2018-09-27

## 2018-09-27 RX ORDER — HYDRALAZINE HYDROCHLORIDE 25 MG/1
25 TABLET, FILM COATED ORAL EVERY 8 HOURS
Status: DISCONTINUED | OUTPATIENT
Start: 2018-09-27 | End: 2018-09-27

## 2018-09-27 RX ORDER — IBUPROFEN 200 MG
24 TABLET ORAL
Status: DISCONTINUED | OUTPATIENT
Start: 2018-09-28 | End: 2018-09-27

## 2018-09-27 RX ORDER — INSULIN ASPART 100 [IU]/ML
0-5 INJECTION, SOLUTION INTRAVENOUS; SUBCUTANEOUS
Status: DISCONTINUED | OUTPATIENT
Start: 2018-09-28 | End: 2018-09-27

## 2018-09-27 RX ORDER — PRAZOSIN HYDROCHLORIDE 1 MG/1
4 CAPSULE ORAL 2 TIMES DAILY
Status: DISCONTINUED | OUTPATIENT
Start: 2018-09-27 | End: 2018-09-28

## 2018-09-27 RX ORDER — INSULIN ASPART 100 [IU]/ML
7 INJECTION, SOLUTION INTRAVENOUS; SUBCUTANEOUS ONCE
Status: COMPLETED | OUTPATIENT
Start: 2018-09-28 | End: 2018-09-27

## 2018-09-27 RX ORDER — SODIUM CHLORIDE 9 MG/ML
INJECTION, SOLUTION INTRAVENOUS CONTINUOUS
Status: DISCONTINUED | OUTPATIENT
Start: 2018-09-27 | End: 2018-09-28

## 2018-09-27 RX ORDER — GLUCAGON 1 MG
1 KIT INJECTION
Status: DISCONTINUED | OUTPATIENT
Start: 2018-09-28 | End: 2018-09-27

## 2018-09-27 RX ORDER — HYDRALAZINE HYDROCHLORIDE 25 MG/1
25 TABLET, FILM COATED ORAL EVERY 8 HOURS PRN
Status: DISCONTINUED | OUTPATIENT
Start: 2018-09-27 | End: 2018-09-27

## 2018-09-27 RX ORDER — IBUPROFEN 200 MG
16 TABLET ORAL
Status: DISCONTINUED | OUTPATIENT
Start: 2018-09-28 | End: 2018-09-27

## 2018-09-27 RX ORDER — HYDRALAZINE HYDROCHLORIDE 25 MG/1
25 TABLET, FILM COATED ORAL EVERY 8 HOURS PRN
Status: DISCONTINUED | OUTPATIENT
Start: 2018-09-27 | End: 2018-10-02 | Stop reason: HOSPADM

## 2018-09-27 RX ORDER — CLONIDINE 0.3 MG/24H
1 PATCH, EXTENDED RELEASE TRANSDERMAL
Status: DISCONTINUED | OUTPATIENT
Start: 2018-09-28 | End: 2018-09-27

## 2018-09-27 RX ORDER — CLONIDINE 0.3 MG/24H
1 PATCH, EXTENDED RELEASE TRANSDERMAL
Status: DISCONTINUED | OUTPATIENT
Start: 2018-09-28 | End: 2018-09-28

## 2018-09-27 RX ADMIN — PRAZOSIN HYDROCHLORIDE 4 MG: 1 CAPSULE ORAL at 09:09

## 2018-09-27 RX ADMIN — IPRATROPIUM BROMIDE AND ALBUTEROL SULFATE 3 ML: .5; 3 SOLUTION RESPIRATORY (INHALATION) at 11:09

## 2018-09-27 RX ADMIN — SODIUM CHLORIDE: 0.9 INJECTION, SOLUTION INTRAVENOUS at 09:09

## 2018-09-27 RX ADMIN — IPRATROPIUM BROMIDE AND ALBUTEROL SULFATE 3 ML: .5; 3 SOLUTION RESPIRATORY (INHALATION) at 04:09

## 2018-09-27 RX ADMIN — PYRIDOXINE HCL TAB 50 MG 50 MG: 50 TAB at 10:09

## 2018-09-27 RX ADMIN — SIMVASTATIN 40 MG: 20 TABLET, FILM COATED ORAL at 09:09

## 2018-09-27 RX ADMIN — DILTIAZEM HYDROCHLORIDE 240 MG: 120 CAPSULE, COATED, EXTENDED RELEASE ORAL at 10:09

## 2018-09-27 RX ADMIN — INSULIN DETEMIR 5 UNITS: 100 INJECTION, SOLUTION SUBCUTANEOUS at 11:09

## 2018-09-27 RX ADMIN — IPRATROPIUM BROMIDE AND ALBUTEROL SULFATE 3 ML: .5; 3 SOLUTION RESPIRATORY (INHALATION) at 07:09

## 2018-09-27 RX ADMIN — PRAZOSIN HYDROCHLORIDE 5 MG: 5 CAPSULE ORAL at 12:09

## 2018-09-27 RX ADMIN — FUROSEMIDE 40 MG: 40 TABLET ORAL at 10:09

## 2018-09-27 RX ADMIN — PIPERACILLIN SODIUM AND TAZOBACTAM SODIUM 4.5 G: 4; .5 INJECTION, POWDER, LYOPHILIZED, FOR SOLUTION INTRAVENOUS at 12:09

## 2018-09-27 RX ADMIN — FLUTICASONE FUROATE AND VILANTEROL TRIFENATATE 1 PUFF: 100; 25 POWDER RESPIRATORY (INHALATION) at 12:09

## 2018-09-27 RX ADMIN — PIPERACILLIN SODIUM AND TAZOBACTAM SODIUM 4.5 G: 4; .5 INJECTION, POWDER, LYOPHILIZED, FOR SOLUTION INTRAVENOUS at 09:09

## 2018-09-27 RX ADMIN — PANTOPRAZOLE SODIUM 40 MG: 40 TABLET, DELAYED RELEASE ORAL at 10:09

## 2018-09-27 RX ADMIN — INSULIN ASPART 7 UNITS: 100 INJECTION, SOLUTION INTRAVENOUS; SUBCUTANEOUS at 11:09

## 2018-09-27 RX ADMIN — VANCOMYCIN HYDROCHLORIDE 1250 MG: 10 INJECTION, POWDER, LYOPHILIZED, FOR SOLUTION INTRAVENOUS at 01:09

## 2018-09-27 RX ADMIN — METHYLPREDNISOLONE SODIUM SUCCINATE 60 MG: 40 INJECTION, POWDER, FOR SOLUTION INTRAMUSCULAR; INTRAVENOUS at 09:09

## 2018-09-27 RX ADMIN — PIPERACILLIN SODIUM AND TAZOBACTAM SODIUM 4.5 G: 4; .5 INJECTION, POWDER, LYOPHILIZED, FOR SOLUTION INTRAVENOUS at 02:09

## 2018-09-27 RX ADMIN — INSULIN ASPART 10 UNITS: 100 INJECTION, SOLUTION INTRAVENOUS; SUBCUTANEOUS at 09:09

## 2018-09-27 RX ADMIN — CLOPIDOGREL 75 MG: 75 TABLET, FILM COATED ORAL at 10:09

## 2018-09-27 RX ADMIN — INSULIN ASPART 5 UNITS: 100 INJECTION, SOLUTION INTRAVENOUS; SUBCUTANEOUS at 05:09

## 2018-09-27 RX ADMIN — HEPARIN SODIUM 5000 UNITS: 5000 INJECTION, SOLUTION INTRAVENOUS; SUBCUTANEOUS at 05:09

## 2018-09-27 RX ADMIN — METHYLPREDNISOLONE SODIUM SUCCINATE 60 MG: 40 INJECTION, POWDER, FOR SOLUTION INTRAMUSCULAR; INTRAVENOUS at 01:09

## 2018-09-27 RX ADMIN — ASPIRIN 81 MG: 81 TABLET, COATED ORAL at 10:09

## 2018-09-27 RX ADMIN — HYDRALAZINE HYDROCHLORIDE 25 MG: 25 TABLET ORAL at 01:09

## 2018-09-27 RX ADMIN — METHYLPREDNISOLONE SODIUM SUCCINATE 60 MG: 40 INJECTION, POWDER, FOR SOLUTION INTRAMUSCULAR; INTRAVENOUS at 05:09

## 2018-09-27 RX ADMIN — LOSARTAN POTASSIUM 100 MG: 50 TABLET, FILM COATED ORAL at 10:09

## 2018-09-27 RX ADMIN — INSULIN ASPART 3 UNITS: 100 INJECTION, SOLUTION INTRAVENOUS; SUBCUTANEOUS at 09:09

## 2018-09-27 RX ADMIN — IPRATROPIUM BROMIDE AND ALBUTEROL SULFATE 3 ML: .5; 3 SOLUTION RESPIRATORY (INHALATION) at 01:09

## 2018-09-27 RX ADMIN — HEPARIN SODIUM 5000 UNITS: 5000 INJECTION, SOLUTION INTRAVENOUS; SUBCUTANEOUS at 09:09

## 2018-09-27 RX ADMIN — HEPARIN SODIUM 5000 UNITS: 5000 INJECTION, SOLUTION INTRAVENOUS; SUBCUTANEOUS at 01:09

## 2018-09-27 NOTE — PROGRESS NOTES
Patient awake alert following commands and passed bedside swallow eval.   Consulted with oncology rn charge nurse who came and removed beavers needle to right chest that was not functioning correctly and re accessed the port with positive blood return.

## 2018-09-27 NOTE — PROGRESS NOTES
[facility] Hospital Medicine  Progress Note    Patient Name: Sheryl Martines  MRN: [id number]  Patient Class: IP- Inpatient   Admission Date: 9/23/2018  Length of Stay: 1 days  Attending Physician: Keyla Martell MD  Primary Care Provider: Primary Doctor [facility] Medicine Team: [facility] HOSP MED 1 Arsenio Riggs MD    Subjective:     Principal Problem:Altered mental status    HPI:  Ms. Martines is a 63-year-old female with recent appendicitis s/p appendectomy, COPD, IDDM, CAD, pontine stroke (2012) presents from SNF for intermittent altered mental status (per SNF) and lethargy of 2 days. Patient denies AMS herself and is attributing drowsiness to pain medications.   She also c/o loose stool and dizziness. She denied fever, chills, headache, chest pain, SOB, nausea, vomiting, dysuria.    Patient currently living in rehab facility for reconditioning and treatment of wound infection s/p appendectomy on 8/26. On previous admission, she presented with slurred speech and AMS. EEG was done at the time and did not show any seizure activity. She found to have appendicitis with peritonitis, and underwent open appendectomy. Her abdominal wound cultures positive for  MRSA, enterococcus, & e. coli.  Also positive for c. diff during admission. She was discharged with PO vanc (stop Sept 13), clinda (1 week), and flagyl (stop Sept 13) per ID/ surgery.    At SNF, she was on Ertapenem (started 9/20) and Vancomycin (started 9/21) until this transfered to ED for present admission.      Hospital Course:  9/25 General surgery consulted and do not believe pt has current surgical complication or secondary infection. Open appy wound site is now healed and not infected at present, intrabdominal RLQ pelvic collection (believed to be hematoma or seroma) is shrinking on abdo CT comparison to previous imaging and not convincing for infection given no WCC or fever per gen surg. However pt does continue to have  intermittent tachycardia to ~115 but is hemodynamically stable with -170s. CT-H is negative for acute intracranial process. Blood cultures are NGTD. Pt mental status still not at baseline per daughter. Is responsive and AAOx3, but poor concentration and easily distractible with poor speech articulation. Opioids have been held due to concern for delirium. Made NPO today per SLP recs due to concern for aspiration risk, continue to assess    Pt is being continued on Zosyn and Vanc despite no clear infectious source isolated due to AMS in setting of pelvic collection, ID recs to continue current Abs until pelvic collection has cleared as confirmed on followup imaging.     Interval History: This morning, rapid response called as pt with mental status change and less responsive.  Pt would open eyes to stimulus, but was nonverbal.  She would obey commands to squeeze fingers.  Daughter at bedside stated pt was conversational last night, and that this is a significant change from her baseline.     STAT CT head, CXR, ABG, CBC ordered.    ABG showed respiratory acidosis with pCO2 of 68, so pt started on BiPAP.    Review of Systems   Unable to perform ROS: Mental status change     Objective:     Vital Signs (Most Recent):  Temp: 97.5 °F (36.4 °C) (09/26/18 1926)  Pulse: (!) 50 (09/26/18 1954)  Resp: 17 (09/26/18 1926)  BP: (!) 170/79 (09/26/18 1926)  SpO2: 100 % (09/26/18 1954) Vital Signs (24h Range):  Temp:  [96.3 °F (35.7 °C)-97.8 °F (36.6 °C)] 97.5 °F (36.4 °C)  Pulse:  [] 50  Resp:  [13-46] 17  SpO2:  [92 %-100 %] 100 %  BP: (118-213)/(60-91) 170/79     Weight: 74.6 kg (164 lb 6.4 oz)  Body mass index is 32.11 kg/m².    Intake/Output Summary (Last 24 hours) at 9/26/2018 2024  Last data filed at 9/26/2018 1840  Gross per 24 hour   Intake 460 ml   Output --   Net 460 ml      Physical Exam   Constitutional: She appears lethargic. No distress.   Difficult to arouse, opens eyes to command, but non-verbal   HENT:    Head: Normocephalic and atraumatic.   Eyes: Pupils are equal, round, and reactive to light.   Cardiovascular: Normal rate, regular rhythm and normal heart sounds.   Pulmonary/Chest: She has decreased breath sounds.   Neurological: She appears lethargic. GCS eye subscore is 3. GCS verbal subscore is 1. GCS motor subscore is 6.   Skin: She is not diaphoretic.       Significant Labs:   ABGs:   Recent Labs   Lab  09/26/18   1708   PH  7.317*   PCO2  68.8*   HCO3  35.2*   POCSATURATED  99   BE  9     Blood Culture:   Recent Labs   Lab  09/26/18   0714  09/26/18   0715   LABBLOO  No Growth to date  No Growth to date     CBC:   Recent Labs   Lab  09/26/18   0953  09/26/18   1002   WBC   --   10.30   HGB   --   11.0*   HCT  32*  39.1   PLT   --   213     CMP:   Recent Labs   Lab  09/25/18   0544  09/26/18   0401   NA  143  143   K  4.4  3.3*   CL  108  106   CO2  24  29   GLU  129*  180*   BUN  12  11   CREATININE  1.0  0.9   CALCIUM  9.3  8.7   PROT  6.2  5.2*   ALBUMIN  2.7*  2.3*   BILITOT  0.5  0.5   ALKPHOS  90  80   AST  28  16   ALT  22  19   ANIONGAP  11  8   EGFRNONAA  >60.0  >60.0     Magnesium:   Recent Labs   Lab  09/25/18   0544  09/26/18   0401   MG  2.5  1.8      POCT Glucose:   Recent Labs   Lab  09/26/18   0520  09/26/18   0919  09/26/18   1217   POCTGLUCOSE  146*  179*  124*     All pertinent labs within the past 24 hours have been reviewed.    Significant Imaging: I have reviewed all pertinent imaging results/findings within the past 24 hours.     CT HEAD WITHOUT CONTRAST (09/26/2018):  FINDINGS (per Román Echavarria MD):  Intracranial compartment:    Ventricles and sulci are normal in size for age without evidence of hydrocephalus. No extra-axial blood or fluid collections.  The ventricles remain midline without evidence of acute hydrocephalus or distortion by mass effect.    No new focal areas of abnormal parenchymal attenuation.  No parenchymal mass, hemorrhage, edema or major vascular distribution  infarct.  Continued empty sella configuration.  Atherosclerosis of the bilateral cavernous ICAs.    Skull/extracranial contents (limited evaluation): No fracture. Mastoid air cells and paranasal sinuses are essentially clear.    Assessment/Plan:      * Altered mental status    Pt presents from SNF with AMS and abdo pain (although pt denies AMS and abdominal pain during review of systems).  - CT head showed no acute path  - Daughter believes pt is altered also, poor speech articulation and concentration. Believes may be due to opioid medications  - Infectious workup negative thus far, opioids held but no tangible improvement in mental status    Plan:  - Started Vanc and Zosyn, continue until pelvic fluid collection has resolved on imaging follow up as per ID recs  - Low dose maintenance fluids as poor cooperation to PO intake  - Continue to hold opioids  - Delirium precautions  - Consider neurology consult  - Continue vancomycin and Zosyn until repeat imaging per ID recs  - Repeat CT head 9/26 without acute abnormality  - Likely CO2 narcosis, as pt with respiratory acidosis with pCO2 of 68  - Solumedrol 60mg IV q6h per CC recs  - 24 hr EEG pending        Atrial fibrillation    AF onset 9/24 AM, will be 48hrs tomorrow morning  RVR today with HR to ~115 and -170s  - Increased dilt from 180 to 240mg today  - Currently on Heparin 5000U q8  - Contraindication for BB due to severe asthma on long term PO pred and albuterol nebs  - Prazosin 5mg and Losartan 100mg (started 9/24) for HTN  - Follow AF for more definitive medical mgmt if does not resolve spontanteously        Gastroesophageal reflux disease without esophagitis    - Pantoprazole 40 mg           Debilitated patient    - PT/OT          (HFpEF) heart failure with preserved ejection fraction    ESSENTIAL HYPERTENSION    Last ECHO 8/23/18 showing EF 60-65%, grade 1 diastolic dysfunction     - Lasix 40 mg daily  - Losartan 100 mg daily  - Prazosin 5 mg BID         Moderate asthma without complication    - Breo daily  - Theophylline 200 mg daily  -  Albuterol PRN  - Pt was on prednisone 10mg daily per daughter  - Relative contraindication for beta blockers in HTN/AF mgmt given significant difficult to control asthma on daily oral pred  - Start duonebs q6h wake  - Start Solumedrol 60mg IV q6h per Critical Care recs              S/P appendectomy    Pt recently hospitalized for appendicitis with peritonitis. She had open appendectomy 8/26 with wound cultures positive for MRSA, enterococcus, and E.col. She was discharged with PO vanc (stop Sept 13), clinda (1 week), and flagyl (stop Sept 13).   At SNF , she was on Ertapenem (started 9/20) and Vancomycin (started 9/21) until this transfered to ED for present admission    A/P per gen surg:  - Unconvinced of SBP/ acute abdomen, or surgical site infection - no WCC no fever, no signs infection at incision site  - RLQ pelvic fluid collection on CT but has been shrinking on its own when compared to previous imaging, do not recommend draining at this time. Likely hematoma or seroma  - Continue Vanc and Zosyn per ID until resolution of pelvic fluid collection on follow up imaging        Clostridium difficile infection    Recent hospitalization positive for C. Diff infection. Treated with PO vanc and flagyl.   Pt presently reporting loose, but not watery, stool    - Repeat C. Diff panel negative          NIKHIL (acute kidney injury)    Cr 1.4 on admit. Baseline 0.8. Likely pre-renal 2/2 poor intake in setting of AMS  Trending 1.4 > 0.9 > 1.0 > 0.9  - UA NAD  - +2.9L/24 hrs  - Patient refusing PO intake, family expressing concern pt not drinking  - Monitor BMP daily            Closed compression fracture of fourth lumbar vertebra    Pt with chronic back pain. S/p spinal stimulator placement    - Avoiding opiates for now because of AMS  - Baclofen PRN for pain  - Started gabapentin   - Holding baclofen and gabapentin in setting of AMS         CAD (coronary artery disease)    - ASA 81  - Plavix 75  - Simvastatin 40           Insulin dependent diabetes mellitus    - Continue insulin detemir 7u BID  - Continue LDSSI  - Diabetic diet  - Glucose well controlled 109-163 throughout admission  - Monitor for hypo/hyperglycemia            VTE Risk Mitigation (From admission, onward)        Ordered     heparin, porcine (PF) 100 unit/mL injection flush 500 Units  As needed (PRN)      09/24/18 0145     heparin (porcine) injection 5,000 Units  Every 8 hours      09/23/18 2100              Arsenio Riggs MD  Department of Hospital Medicine   [facility]                    09/27/2018                             STAFF PHYSICIAN NOTE                                   Attending Attestation for Rounds with Resident  I have reviewed and concur with the resident's history, physical, assessment, and plan.  I have personally interviewed and examined the patient at bedside and agree with the resident's findings.                                  ________________________________________                                     REASON FOR ADMISSION:     Patient is 63 y.o.female    Body mass index is 32.11 kg/m².,  Altered mental status

## 2018-09-27 NOTE — ASSESSMENT & PLAN NOTE
Recent hospitalization positive for C. Diff infection. Treated with PO vanc and flagyl.   Pt presently reporting loose, but not watery, stool    - Repeat C. Diff panel negative

## 2018-09-27 NOTE — ASSESSMENT & PLAN NOTE
- Breo daily  - Theophylline 200 mg daily  -  Albuterol PRN  - Pt was on prednisone 10mg daily per daughter  - Relative contraindication for beta blockers in HTN/AF mgmt given significant difficult to control asthma on daily oral pred  - Start duonebs q6h wake  - Start Solumedrol 60mg IV q6h per Critical Care recs  - 9/27 decreased to solumedrol 60mg q12h, plan return to usual PO pred 10mg tomorrow

## 2018-09-27 NOTE — ASSESSMENT & PLAN NOTE
Cr 1.4 on admit. Baseline 0.8. Likely pre-renal 2/2 poor intake in setting of AMS  Trending 1.4 > 0.9 > 1.0 > 0.9  - UA NAD  - +2.9L/24 hrs  - Patient refusing PO intake, family expressing concern pt not drinking  - Monitor BMP daily

## 2018-09-27 NOTE — PROGRESS NOTES
Rapid Response Follow-up Note    Followed up with patient for proactive rounding.   No acute issues at this time. PCO2 much improved on Bipap. Pt arousable to voice and able to follow commands. Vital signs within normal limits  Reviewed plan of care with primary RN Pao  Please call Rapid Response RN with any questions or concerns at  [phone].

## 2018-09-27 NOTE — ASSESSMENT & PLAN NOTE
Cr 1.4 on admit. Baseline 0.8. Likely pre-renal 2/2 poor intake in setting of AMS  Trending 1.4 > 0.9 > 1.0 > 0.9 > 0.8  - UA NAD  - +2.9L/24 hrs after admission  - Patient refusing PO intake, family expressing concern pt not drinking. Started on slow maintenance fluids  - Monitor BMP daily

## 2018-09-27 NOTE — SUBJECTIVE & OBJECTIVE
Interval History: This morning, rapid response called as pt with mental status change and less responsive.  Pt would open eyes to stimulus, but was nonverbal.  She would obey commands to squeeze fingers.  Daughter at bedside stated pt was conversational last night, and that this is a significant change from her baseline.     STAT CT head, CXR, ABG, CBC ordered.    ABG showed respiratory acidosis with pCO2 of 68, so pt started on BiPAP.    Review of Systems   Unable to perform ROS: Mental status change     Objective:     Vital Signs (Most Recent):  Temp: 98.6 °F (37 °C) (09/27/18 1422)  Pulse: 94 (09/27/18 1530)  Resp: 20 (09/27/18 1422)  BP: (!) 144/69 (09/27/18 1422)  SpO2: 100 % (09/27/18 1530) Vital Signs (24h Range):  Temp:  [96.2 °F (35.7 °C)-98.6 °F (37 °C)] 98.6 °F (37 °C)  Pulse:  [] 94  Resp:  [17-20] 20  SpO2:  [92 %-100 %] 100 %  BP: (143-231)/(69-95) 144/69     Weight: 74.6 kg (164 lb 6.4 oz)  Body mass index is 32.11 kg/m².    Intake/Output Summary (Last 24 hours) at 9/27/2018 1633  Last data filed at 9/27/2018 0600  Gross per 24 hour   Intake 550 ml   Output 0 ml   Net 550 ml      Physical Exam   Constitutional: She is oriented to person, place, and time. She appears well-developed and well-nourished. No distress.   Opens eyes and squeeze hand to command, able to verbalize today oriented x3 but distractable and poor concentration and alertness   HENT:   Head: Normocephalic and atraumatic.   Eyes: Conjunctivae are normal. No scleral icterus.   Neck: No JVD present.   Cardiovascular: Normal rate, regular rhythm and normal heart sounds.   Pulmonary/Chest: Breath sounds normal. No stridor. No respiratory distress. She has no wheezes. She has no rales.   sats 100% on 21% Bipap, stepped down to 2L NP   Abdominal: There is no tenderness. There is no rebound and no guarding.   Neurological: She is alert and oriented to person, place, and time. GCS eye subscore is 3. GCS verbal subscore is 1. GCS motor  subscore is 6.   Skin: Skin is warm and dry. She is not diaphoretic.       Significant Labs:   ABGs:   Recent Labs   Lab  09/26/18   2115   PH  7.410   PCO2  49.4*   HCO3  31.3*   POCSATURATED  96   BE  7     Blood Culture:   Recent Labs   Lab  09/26/18   0714  09/26/18   0715   LABBLOO  No Growth to date  No Growth to date  No Growth to date  No Growth to date     CBC:   Recent Labs   Lab  09/26/18   0953  09/26/18   1002  09/27/18   0456   WBC   --   10.30  7.41   HGB   --   11.0*  10.7*   HCT  32*  39.1  36.1*   PLT   --   213  242     CMP:   Recent Labs   Lab  09/26/18   0401  09/27/18   0456   NA  143  141   K  3.3*  4.4   CL  106  105   CO2  29  27   GLU  180*  133*   BUN  11  18   CREATININE  0.9  0.8   CALCIUM  8.7  9.5   PROT  5.2*  6.0   ALBUMIN  2.3*  2.5*   BILITOT  0.5  0.5   ALKPHOS  80  78   AST  16  14   ALT  19  22   ANIONGAP  8  9   EGFRNONAA  >60.0  >60.0     Magnesium:   Recent Labs   Lab  09/26/18   0401  09/27/18   0456   MG  1.8  1.9      POCT Glucose:   Recent Labs   Lab  09/26/18   2309  09/27/18   1046  09/27/18   1255   POCTGLUCOSE  134*  144*  153*     All pertinent labs within the past 24 hours have been reviewed.    Significant Imaging: I have reviewed all pertinent imaging results/findings within the past 24 hours.     CT HEAD WITHOUT CONTRAST (09/26/2018):  FINDINGS (per Román Echavarria MD):  Intracranial compartment:    Ventricles and sulci are normal in size for age without evidence of hydrocephalus. No extra-axial blood or fluid collections.  The ventricles remain midline without evidence of acute hydrocephalus or distortion by mass effect.    No new focal areas of abnormal parenchymal attenuation.  No parenchymal mass, hemorrhage, edema or major vascular distribution infarct.  Continued empty sella configuration.  Atherosclerosis of the bilateral cavernous ICAs.    Skull/extracranial contents (limited evaluation): No fracture. Mastoid air cells and paranasal sinuses are essentially  clear.

## 2018-09-27 NOTE — PLAN OF CARE
Problem: Patient Care Overview  Goal: Plan of Care Review  Outcome: Ongoing (interventions implemented as appropriate)  VSS. No complaints of pain.  Patient much more alert able to speak in full sentences and follow commands.  Continuous EEG in progress.  Purewick in place.  No acute events overnight. Safety maintained.  All questions answered. Patient updated on plan of care.  Will continue to monitor.

## 2018-09-27 NOTE — ASSESSMENT & PLAN NOTE
- Continue insulin detemir 7u BID  - Continue LDSSI  - Diabetic diet  - Glucose well controlled 109-163 throughout admission  - Monitor for hypo/hyperglycemia

## 2018-09-27 NOTE — NURSING
Primary team made aware of pt's hypertension SBP >230. PO hydralazine administered as ordered. Discussed risk vs benefit of medication given pt's allergy to hydralazine with team. Administered as ordered. Pt's BP has improved to 144/69. . SPO2 95% on 2L nc. No respiratory distress observed or reported. Pt alert and oriented x4. Stool sample sent for testing given pt's recent hx of C.Diff and her frequent liquid stools ( x5 this shift). WCM.

## 2018-09-27 NOTE — ASSESSMENT & PLAN NOTE
Pt with chronic back pain. S/p spinal stimulator placement    - Avoiding opiates for now because of AMS  - Baclofen PRN for pain  - Started gabapentin   - Holding baclofen and gabapentin in setting of AMS

## 2018-09-27 NOTE — ASSESSMENT & PLAN NOTE
- Breo daily  - Theophylline 200 mg daily  -  Albuterol PRN  - Pt was on prednisone 10mg daily per daughter  - Relative contraindication for beta blockers in HTN/AF mgmt given significant difficult to control asthma on daily oral pred  - Start duonebs q6h wake  - Start Solumedrol 60mg IV q6h per Critical Care recs

## 2018-09-27 NOTE — PT/OT/SLP PROGRESS
Occupational Therapy      Patient Name:  Sheryl Martines   MRN:  [id number]    Patient not seen today secondary to on nurse hold due to high BP. Will follow-up per schedule.    NAVI Wiggins  9/27/2018

## 2018-09-27 NOTE — ASSESSMENT & PLAN NOTE
AF onset 9/24 AM, will be 48hrs tomorrow morning  RVR today with HR to ~115 and -170s  - Increased dilt from 180 to 240mg today (9/25), rate control has been much better <100 in following days  - Currently on Heparin 5000U q8  - Contraindication for BB due to severe asthma on long term PO pred and albuterol nebs

## 2018-09-27 NOTE — ASSESSMENT & PLAN NOTE
Pt presents from SNF with AMS and abdo pain (although pt denies AMS and abdominal pain during review of systems).  - CT head showed no acute path  - Daughter believes pt is altered also, poor speech articulation and concentration. Believes may be due to opioid medications  - Infectious workup negative thus far, opioids held but no tangible improvement in mental status    Plan:  - Started Vanc and Zosyn, continue until pelvic fluid collection has resolved on imaging follow up as per ID recs  - Low dose maintenance fluids as poor cooperation to PO intake  - Continue to hold opioids  - Delirium precautions  - Consider neurology consult  - Continue vancomycin and Zosyn until repeat imaging per ID recs  - Repeat CT head 9/26 without acute abnormality  - Likely CO2 narcosis, as pt with respiratory acidosis with pCO2 of 68  - Solumedrol 60mg IV q6h per CC recs  - 24 hr EEG pending

## 2018-09-27 NOTE — PT/OT/SLP PROGRESS
Physical Therapy      Patient Name:  Sheryl Martines   MRN:  [id number]    Patient not seen today secondary to Other (Comment). Pt on hold in AM and PM. Pt on continuous BiPAP in AM and high BP in PM. Will follow-up when next scheduled.    MILTON HERNANDEZ, PT   9/27/2018

## 2018-09-27 NOTE — NURSING
Spoke to Dr. Flowers about pt not being able to swallow at this time because she's sleepy and on BiPAP.  Dr Flowers ok with holding oral meds tonight.

## 2018-09-27 NOTE — PROGRESS NOTES
[facility] Hospital Medicine  Progress Note    Patient Name: Sheryl Martines  MRN: [id number]  Patient Class: IP- Inpatient   Admission Date: 9/23/2018  Length of Stay: 2 days  Attending Physician: Keyla Martell MD  Primary Care Provider: Primary Doctor [facility] Medicine Team: [facility] [place] HOSP MED 1 Wayne Morris MD    Subjective:     Principal Problem:Altered mental status    HPI:  Ms. Martines is a 63-year-old female with recent appendicitis s/p appendectomy, COPD, IDDM, CAD, pontine stroke (2012) presents from SNF for intermittent altered mental status (per SNF) and lethargy of 2 days. Patient denies AMS herself and is attributing drowsiness to pain medications.   She also c/o loose stool and dizziness. She denied fever, chills, headache, chest pain, SOB, nausea, vomiting, dysuria.    Patient currently living in rehab facility for reconditioning and treatment of wound infection s/p appendectomy on 8/26. On previous admission, she presented with slurred speech and AMS. EEG was done at the time and did not show any seizure activity. She found to have appendicitis with peritonitis, and underwent open appendectomy. Her abdominal wound cultures positive for  MRSA, enterococcus, & e. coli.  Also positive for c. diff during admission. She was discharged with PO vanc (stop Sept 13), clinda (1 week), and flagyl (stop Sept 13) per ID/ surgery.    At SNF, she was on Ertapenem (started 9/20) and Vancomycin (started 9/21) until this transfered to ED for present admission.      Hospital Course:  9/25 General surgery consulted and do not believe pt has current surgical complication or secondary infection. Open appy wound site is now healed and not infected at present, intrabdominal RLQ pelvic collection (believed to be hematoma or seroma) is shrinking on abdo CT comparison to previous imaging and not convincing for infection given no WCC or fever per gen surg. However pt does continue to have intermittent  tachycardia to ~115 but is hemodynamically stable with -170s. CT-H is negative for acute intracranial process. Blood cultures are NGTD. Pt mental status still not at baseline per daughter. Is responsive and AAOx3, but poor concentration and easily distractible with poor speech articulation. Opioids have been held due to concern for delirium. Made NPO today per SLP recs due to concern for aspiration risk, continue to assess    Pt is being continued on Zosyn and Vanc despite no clear infectious source isolated due to AMS in setting of pelvic collection, ID recs to continue current Abs until pelvic collection has cleared as confirmed on followup imaging.     9/27  Rapid response called yesterday morning due to decreased responsiveness / non-verbal, significant decrease from baseline on admission. Found to be in CO2 narcosis with respiratory acidosis on ABG (pH 7.31 CO2 69) started on bipap, follow up ABG pH 7.41 CO2 49. Today AMS resolved, however patient developed hypertensive crisis with  and new onset headache, had missed her anti-hypertensive medications the day before due to being NPO for aspiration risk. Regular meds given and single dose hydralazine 25mg also given. Started on clonidine 0.3mg patch due to inadequate BP control (still -180 even before missed medication doses). BP now 144/69 HR 94    EEG showed mild encephalitis, was negative for epileptiform activity    Interval History: This morning, rapid response called as pt with mental status change and less responsive.  Pt would open eyes to stimulus, but was nonverbal.  She would obey commands to squeeze fingers.  Daughter at bedside stated pt was conversational last night, and that this is a significant change from her baseline.     STAT CT head, CXR, ABG, CBC ordered.    ABG showed respiratory acidosis with pCO2 of 68, so pt started on BiPAP.    Review of Systems   Unable to perform ROS: Mental status change     Objective:     Vital  Signs (Most Recent):  Temp: 98.6 °F (37 °C) (09/27/18 1422)  Pulse: 94 (09/27/18 1530)  Resp: 20 (09/27/18 1422)  BP: (!) 144/69 (09/27/18 1422)  SpO2: 100 % (09/27/18 1530) Vital Signs (24h Range):  Temp:  [96.2 °F (35.7 °C)-98.6 °F (37 °C)] 98.6 °F (37 °C)  Pulse:  [] 94  Resp:  [17-20] 20  SpO2:  [92 %-100 %] 100 %  BP: (143-231)/(69-95) 144/69     Weight: 74.6 kg (164 lb 6.4 oz)  Body mass index is 32.11 kg/m².    Intake/Output Summary (Last 24 hours) at 9/27/2018 1633  Last data filed at 9/27/2018 0600  Gross per 24 hour   Intake 550 ml   Output 0 ml   Net 550 ml      Physical Exam   Constitutional: She is oriented to person, place, and time. She appears well-developed and well-nourished. No distress.   Opens eyes and squeeze hand to command, able to verbalize today oriented x3 but distractable and poor concentration and alertness   HENT:   Head: Normocephalic and atraumatic.   Eyes: Conjunctivae are normal. No scleral icterus.   Neck: No JVD present.   Cardiovascular: Normal rate, regular rhythm and normal heart sounds.   Pulmonary/Chest: Breath sounds normal. No stridor. No respiratory distress. She has no wheezes. She has no rales.   sats 100% on 21% Bipap, stepped down to 2L NP   Abdominal: There is no tenderness. There is no rebound and no guarding.   Neurological: She is alert and oriented to person, place, and time. GCS eye subscore is 3. GCS verbal subscore is 1. GCS motor subscore is 6.   Skin: Skin is warm and dry. She is not diaphoretic.       Significant Labs:   ABGs:   Recent Labs   Lab  09/26/18   2115   PH  7.410   PCO2  49.4*   HCO3  31.3*   POCSATURATED  96   BE  7     Blood Culture:   Recent Labs   Lab  09/26/18   0714  09/26/18   0715   LABBLOO  No Growth to date  No Growth to date  No Growth to date  No Growth to date     CBC:   Recent Labs   Lab  09/26/18   0953  09/26/18   1002  09/27/18   0456   WBC   --   10.30  7.41   HGB   --   11.0*  10.7*   HCT  32*  39.1  36.1*   PLT   --    213  242     CMP:   Recent Labs   Lab  09/26/18   0401  09/27/18   0456   NA  143  141   K  3.3*  4.4   CL  106  105   CO2  29  27   GLU  180*  133*   BUN  11  18   CREATININE  0.9  0.8   CALCIUM  8.7  9.5   PROT  5.2*  6.0   ALBUMIN  2.3*  2.5*   BILITOT  0.5  0.5   ALKPHOS  80  78   AST  16  14   ALT  19  22   ANIONGAP  8  9   EGFRNONAA  >60.0  >60.0     Magnesium:   Recent Labs   Lab  09/26/18   0401  09/27/18   0456   MG  1.8  1.9      POCT Glucose:   Recent Labs   Lab  09/26/18   2309  09/27/18   1046  09/27/18   1255   POCTGLUCOSE  134*  144*  153*     All pertinent labs within the past 24 hours have been reviewed.    Significant Imaging: I have reviewed all pertinent imaging results/findings within the past 24 hours.     CT HEAD WITHOUT CONTRAST (09/26/2018):  FINDINGS (per Román Echavarria MD):  Intracranial compartment:    Ventricles and sulci are normal in size for age without evidence of hydrocephalus. No extra-axial blood or fluid collections.  The ventricles remain midline without evidence of acute hydrocephalus or distortion by mass effect.    No new focal areas of abnormal parenchymal attenuation.  No parenchymal mass, hemorrhage, edema or major vascular distribution infarct.  Continued empty sella configuration.  Atherosclerosis of the bilateral cavernous ICAs.    Skull/extracranial contents (limited evaluation): No fracture. Mastoid air cells and paranasal sinuses are essentially clear.    Assessment/Plan:      * Altered mental status    Pt presents from SNF with AMS and abdo pain (although pt denies AMS and abdominal pain during review of systems).  - CT head showed no acute path  - Daughter believes pt is altered also, poor speech articulation and concentration. Believes may be due to opioid medications  - Infectious workup negative thus far, opioids held but no tangible improvement in mental status    Plan:  - Started Vanc and Zosyn, continue until pelvic fluid collection has resolved on imaging  follow up as per ID recs  - Low dose maintenance fluids as poor cooperation to PO intake  - Continue to hold opioids  - Delirium precautions  - Consider neurology consult  - Continue vancomycin and Zosyn until repeat imaging per ID recs  - Repeat CT head 9/26 without acute abnormality, CXR also NAD  - Likely CO2 narcosis, as pt with respiratory acidosis with pCO2 of 68  - Solumedrol 60mg IV q6h per CC recs -> decreased to 60mg q12 9/27  - 24 hr EEG shows mild encephalitis without epileptiform activity        Atrial fibrillation    AF onset 9/24 AM, will be 48hrs tomorrow morning  RVR today with HR to ~115 and -170s  - Increased dilt from 180 to 240mg today (9/25), rate control has been much better <100 in following days  - Currently on Heparin 5000U q8  - Contraindication for BB due to severe asthma on long term PO pred and albuterol nebs        Essential hypertension    9/27 - Continues to have very labile Bps throughout admission. Hypertensive crisis late this morning with  and new onset headache, due to missed medication dosing yesterday because NPO due to aspiration risk. Pt given 25mg hydralazine in addition to regular medication dosing:  - losartan 100mg  - Prazosin 5mg  - lasix PO 40 od  - Dilt 180 --> 240mg od  - Started clonidine 0.3mg patch due to poor BP control even before missed dosing (-180 spikes throughout hospital stay)  - Will monitor BP, potentially decreasing prazosin dose if hypotensive episodes develop          Gastroesophageal reflux disease without esophagitis    - Pantoprazole 40 mg           Debilitated patient    - PT/OT          (HFpEF) heart failure with preserved ejection fraction    ESSENTIAL HYPERTENSION    Last ECHO 8/23/18 showing EF 60-65%, grade 1 diastolic dysfunction     - Lasix 40 mg daily  - Losartan 100 mg daily  - Prazosin 5 mg BID  - Dilt 180mg -> 240mg            Moderate asthma without complication    - Breo daily  - Theophylline 200 mg daily  -   Albuterol PRN  - Pt was on prednisone 10mg daily per daughter  - Relative contraindication for beta blockers in HTN/AF mgmt given significant difficult to control asthma on daily oral pred  - Start duonebs q6h wake  - Start Solumedrol 60mg IV q6h per Critical Care recs  - 9/27 decreased to solumedrol 60mg q12h, plan return to usual PO pred 10mg tomorrow              S/P appendectomy    Pt recently hospitalized for appendicitis with peritonitis. She had open appendectomy 8/26 with wound cultures positive for MRSA, enterococcus, and E.col. She was discharged with PO vanc (stop Sept 13), clinda (1 week), and flagyl (stop Sept 13).   At SNF , she was on Ertapenem (started 9/20) and Vancomycin (started 9/21) until this transfered to ED for present admission    A/P per gen surg:  - Unconvinced of SBP/ acute abdomen, or surgical site infection - no WCC no fever, no signs infection at incision site  - RLQ pelvic fluid collection on CT but has been shrinking on its own when compared to previous imaging, do not recommend draining at this time. Likely hematoma or seroma  - Continue Vanc and Zosyn per ID until resolution of pelvic fluid collection on follow up imaging        Clostridium difficile infection    Recent hospitalization positive for C. Diff infection. Treated with PO vanc and flagyl.   Pt presently reporting loose, but not watery, stool    - Repeat C. Diff panel negative          NIKHIL (acute kidney injury)    Cr 1.4 on admit. Baseline 0.8. Likely pre-renal 2/2 poor intake in setting of AMS  Trending 1.4 > 0.9 > 1.0 > 0.9 > 0.8  - UA NAD  - +2.9L/24 hrs after admission  - Patient refusing PO intake, family expressing concern pt not drinking. Started on slow maintenance fluids  - Monitor BMP daily            Closed compression fracture of fourth lumbar vertebra    Pt with chronic back pain. S/p spinal stimulator placement    - Avoiding opiates for now because of AMS  - Baclofen PRN for pain  - Started gabapentin   -  Holding baclofen and gabapentin in setting of AMS        CAD (coronary artery disease)    - ASA 81  - Plavix 75  - Simvastatin 40           Insulin dependent diabetes mellitus    - Continue insulin detemir 7u BID  - Continue LDSSI  - Diabetic diet  - Glucose well controlled 109-163 throughout admission  - Monitor for hypo/hyperglycemia            VTE Risk Mitigation (From admission, onward)        Ordered     heparin, porcine (PF) 100 unit/mL injection flush 500 Units  As needed (PRN)      09/24/18 0145     heparin (porcine) injection 5,000 Units  Every 8 hours      09/23/18 2100              Wayne Morris MD  Department of Hospital Medicine   [facility]                    09/27/2018                             STAFF PHYSICIAN NOTE                                   Attending Attestation for Rounds with Resident  I have reviewed and concur with the resident's history, physical, assessment, and plan.  I have personally interviewed and examined the patient at bedside and agree with the resident's findings.                                  ________________________________________                                     REASON FOR ADMISSION:     Patient is 63 y.o.female    Body mass index is 32.11 kg/m².,  Altered mental status

## 2018-09-27 NOTE — NURSING
Pt alert and oriented x3. Disoriented to time. Requesting bipap to be weaned but remains cooperative with care. Swallowing water with meds without difficulty. WCM.

## 2018-09-27 NOTE — NURSING
Continuous Bipap in use. SPO2 100%, RR 17, FIO2 21%. Pt arousing to voice, speech clear, oriented x3, disoriented to time. Moving all extremities spontaneously. Repositioning in med with assist x1. Requesting ice, bed pan, and chapstick. POC for today reviewed with pt. Pt acknowledged understanding. Will continue to monitor closely.

## 2018-09-27 NOTE — ASSESSMENT & PLAN NOTE
Pt presents from SNF with AMS and abdo pain (although pt denies AMS and abdominal pain during review of systems).  - CT head showed no acute path  - Daughter believes pt is altered also, poor speech articulation and concentration. Believes may be due to opioid medications  - Infectious workup negative thus far, opioids held but no tangible improvement in mental status    Plan:  - Started Vanc and Zosyn, continue until pelvic fluid collection has resolved on imaging follow up as per ID recs  - Low dose maintenance fluids as poor cooperation to PO intake  - Continue to hold opioids  - Delirium precautions  - Consider neurology consult  - Continue vancomycin and Zosyn until repeat imaging per ID recs  - Repeat CT head 9/26 without acute abnormality, CXR also NAD  - Likely CO2 narcosis, as pt with respiratory acidosis with pCO2 of 68  - Solumedrol 60mg IV q6h per CC recs -> decreased to 60mg q12 9/27  - 24 hr EEG shows mild encephalitis without epileptiform activity

## 2018-09-27 NOTE — PT/OT/SLP PROGRESS
Speech Language Pathology Missed Treatment Note      Sheryl Martines  MRN: [id number]    Patient not seen today secondary to Nursing hold (Patient remains on BiPAP). Will follow-up at next scheduled treatment date 9/28/18    Marta Soto M.S., TONY-SLP  Speech Language Pathologist  [phone]  09/27/2018

## 2018-09-27 NOTE — SUBJECTIVE & OBJECTIVE
Interval History: This morning, rapid response called as pt with mental status change and less responsive.  Pt would open eyes to stimulus, but was nonverbal.  She would obey commands to squeeze fingers.  Daughter at bedside stated pt was conversational last night, and that this is a significant change from her baseline.     STAT CT head, CXR, ABG, CBC ordered.    ABG showed respiratory acidosis with pCO2 of 68, so pt started on BiPAP.    Review of Systems   Unable to perform ROS: Mental status change     Objective:     Vital Signs (Most Recent):  Temp: 97.5 °F (36.4 °C) (09/26/18 1926)  Pulse: (!) 50 (09/26/18 1954)  Resp: 17 (09/26/18 1926)  BP: (!) 170/79 (09/26/18 1926)  SpO2: 100 % (09/26/18 1954) Vital Signs (24h Range):  Temp:  [96.3 °F (35.7 °C)-97.8 °F (36.6 °C)] 97.5 °F (36.4 °C)  Pulse:  [] 50  Resp:  [13-46] 17  SpO2:  [92 %-100 %] 100 %  BP: (118-213)/(60-91) 170/79     Weight: 74.6 kg (164 lb 6.4 oz)  Body mass index is 32.11 kg/m².    Intake/Output Summary (Last 24 hours) at 9/26/2018 2024  Last data filed at 9/26/2018 1840  Gross per 24 hour   Intake 460 ml   Output --   Net 460 ml      Physical Exam   Constitutional: She appears lethargic. No distress.   Difficult to arouse, opens eyes to command, but non-verbal   HENT:   Head: Normocephalic and atraumatic.   Eyes: Pupils are equal, round, and reactive to light.   Cardiovascular: Normal rate, regular rhythm and normal heart sounds.   Pulmonary/Chest: She has decreased breath sounds.   Neurological: She appears lethargic. GCS eye subscore is 3. GCS verbal subscore is 1. GCS motor subscore is 6.   Skin: She is not diaphoretic.       Significant Labs:   ABGs:   Recent Labs   Lab  09/26/18 1708   PH  7.317*   PCO2  68.8*   HCO3  35.2*   POCSATURATED  99   BE  9     Blood Culture:   Recent Labs   Lab  09/26/18   0714  09/26/18   0715   LABBLOO  No Growth to date  No Growth to date     CBC:   Recent Labs   Lab  09/26/18   0953  09/26/18   1002    WBC   --   10.30   HGB   --   11.0*   HCT  32*  39.1   PLT   --   213     CMP:   Recent Labs   Lab  09/25/18   0544  09/26/18   0401   NA  143  143   K  4.4  3.3*   CL  108  106   CO2  24  29   GLU  129*  180*   BUN  12  11   CREATININE  1.0  0.9   CALCIUM  9.3  8.7   PROT  6.2  5.2*   ALBUMIN  2.7*  2.3*   BILITOT  0.5  0.5   ALKPHOS  90  80   AST  28  16   ALT  22  19   ANIONGAP  11  8   EGFRNONAA  >60.0  >60.0     Magnesium:   Recent Labs   Lab  09/25/18   0544  09/26/18   0401   MG  2.5  1.8      POCT Glucose:   Recent Labs   Lab  09/26/18   0520  09/26/18   0919  09/26/18   1217   POCTGLUCOSE  146*  179*  124*     All pertinent labs within the past 24 hours have been reviewed.    Significant Imaging: I have reviewed all pertinent imaging results/findings within the past 24 hours.     CT HEAD WITHOUT CONTRAST (09/26/2018):  FINDINGS (per Román Echavarria MD):  Intracranial compartment:    Ventricles and sulci are normal in size for age without evidence of hydrocephalus. No extra-axial blood or fluid collections.  The ventricles remain midline without evidence of acute hydrocephalus or distortion by mass effect.    No new focal areas of abnormal parenchymal attenuation.  No parenchymal mass, hemorrhage, edema or major vascular distribution infarct.  Continued empty sella configuration.  Atherosclerosis of the bilateral cavernous ICAs.    Skull/extracranial contents (limited evaluation): No fracture. Mastoid air cells and paranasal sinuses are essentially clear.

## 2018-09-27 NOTE — PROCEDURES
DATE OF SERVICE:  09/26/2018    EEG NUMBER:  [id number].    REQUESTED BY:  Dr. Martell.    LOCATION OF SERVICE:  8085.    ICU EEG/VIDEO MONITORING REPORT    METHODOLOGY:  Electroencephalographic (EEG) is recorded with electrodes placed   according to the International 10-20 placement system.  Thirty two (32) channels   of digital signal (sampling rate of 512/sec), including T1 and T2, were   simultaneously recorded from the scalp and may include EKG, EMG, and/or eye   monitors.  Recording band pass was 0.1 to 512 Hz.  Digital video recording of   the patient is simultaneously recorded with the EEG.  The patient is instructed   to report clinical symptoms which may occur during the recording session.  EEG   and video recording are stored and archived in digital format.  Activation   procedures, which include photic stimulation, hyperventilation and instructing   patients to perform simple tasks, are done in selected patients.    The EEG is displayed on a monitor screen and can be reviewed using different   montages.  Computer-assisted analysis is employed to detect spike and   electrographic seizure activity.  The entire record is submitted for computer   analysis.  The entire recording is visually reviewed, and the times identified   by computer analysis as being spikes or seizures are reviewed again.    Compressed spectral analysis (CSA) is also performed on the activity recorded   from each individual channel.  This is displayed as a power display of   frequencies from 0 to 30 Hz over time.  The CSA is reviewed looking for   asymmetries in power between homologous areas of the scalp, then compared with   the original EEG recording.    Veeqo software was also utilized in the review of this study.  This software   suite analyzes the EEG recording in multiple domains.  Coherence and rhythmicity   are computed to identify EEG sections which may contain organized seizures.    Each channel undergoes analysis to detect  the presence of spike and sharp waves   which have special and morphological characteristics of epileptic activity.  The   routine EEG recording is converted from special into frequency domain.  This is   then displayed comparing homologous areas to identify areas of significant   asymmetry.  Algorithm to identify non-cortically generated artifact is used to   separate artifact from the EEG.    RECORDING TIMES:  Start on 09/26/2018 at 18:07.  End on 09/27/2018 at 07:00.  A total of 12 hours and 46 minutes of EEG monitoring was obtained.    EEG FINDINGS:  The recording was obtained at the patient's bedside in the ICU.    There were portions when the patient was moving around and interacting with the   staff.  Background then got a posterior dominant rhythm of 6 to 7 Hz seen in the   occipital, parietal and posterior temporal regions bilaterally.  Irregular beta   and some slower theta was noted diffusely.  Sleep was characterized by   mid-range theta seen diffusely with a frequency just slightly slower than the   posterior dominant rhythm, but with a different distribution.  No lateralized or   focal changes were noted and no spike or sharp wave activity was seen.    Activation procedures were not carried out.  There were no lateralized or focal   changes and no epileptic activity was recorded.    IMPRESSION:  Abnormal EEG with slowing of the posterior dominant rhythm   indicative of a mild nonfocal and nonspecific encephalopathy.  It also did not   have the typical cycling of sleep.  This indicates the presence of a nonspecific   and nonfocal encephalopathy.      RR/HN  dd: 09/27/2018 09:30:44 (CDT)  td: 09/27/2018 09:48:56 (CDT)  Doc ID   #[id number]  Job ID #[id number]    CC:

## 2018-09-27 NOTE — ASSESSMENT & PLAN NOTE
9/27 - Continues to have very labile Bps throughout admission. Hypertensive crisis late this morning with  and new onset headache, due to missed medication dosing yesterday because NPO due to aspiration risk. Pt given 25mg hydralazine in addition to regular medication dosing:  - losartan 100mg  - Prazosin 5mg  - lasix PO 40 od  - Dilt 180 --> 240mg od  - Started clonidine 0.3mg patch due to poor BP control even before missed dosing (-180 spikes throughout hospital stay)  - Will monitor BP, potentially decreasing prazosin dose if hypotensive episodes develop

## 2018-09-27 NOTE — ASSESSMENT & PLAN NOTE
ESSENTIAL HYPERTENSION    Last ECHO 8/23/18 showing EF 60-65%, grade 1 diastolic dysfunction     - Lasix 40 mg daily  - Losartan 100 mg daily  - Prazosin 5 mg BID  - Dilt 180mg -> 240mg

## 2018-09-28 LAB
ALBUMIN SERPL BCP-MCNC: 2.4 G/DL
ALP SERPL-CCNC: 68 U/L
ALT SERPL W/O P-5'-P-CCNC: 16 U/L
ANION GAP SERPL CALC-SCNC: 6 MMOL/L
AST SERPL-CCNC: 9 U/L
BACTERIA BLD CULT: NORMAL
BACTERIA BLD CULT: NORMAL
BASOPHILS # BLD AUTO: 0.01 K/UL
BASOPHILS NFR BLD: 0.1 %
BILIRUB SERPL-MCNC: 0.4 MG/DL
BUN SERPL-MCNC: 23 MG/DL
CALCIUM SERPL-MCNC: 8.9 MG/DL
CHLORIDE SERPL-SCNC: 105 MMOL/L
CO2 SERPL-SCNC: 29 MMOL/L
CREAT SERPL-MCNC: 1.3 MG/DL
DIFFERENTIAL METHOD: ABNORMAL
EOSINOPHIL # BLD AUTO: 0 K/UL
EOSINOPHIL NFR BLD: 0 %
ERYTHROCYTE [DISTWIDTH] IN BLOOD BY AUTOMATED COUNT: 18.6 %
EST. GFR  (AFRICAN AMERICAN): 50.5 ML/MIN/1.73 M^2
EST. GFR  (NON AFRICAN AMERICAN): 43.8 ML/MIN/1.73 M^2
GLUCOSE SERPL-MCNC: 294 MG/DL
HCT VFR BLD AUTO: 30.7 %
HGB BLD-MCNC: 9.2 G/DL
IMM GRANULOCYTES # BLD AUTO: 0.09 K/UL
IMM GRANULOCYTES NFR BLD AUTO: 1 %
LYMPHOCYTES # BLD AUTO: 1.1 K/UL
LYMPHOCYTES NFR BLD: 12.9 %
MAGNESIUM SERPL-MCNC: 1.9 MG/DL
MCH RBC QN AUTO: 26.1 PG
MCHC RBC AUTO-ENTMCNC: 30 G/DL
MCV RBC AUTO: 87 FL
MONOCYTES # BLD AUTO: 0.4 K/UL
MONOCYTES NFR BLD: 4.8 %
NEUTROPHILS # BLD AUTO: 7.1 K/UL
NEUTROPHILS NFR BLD: 81.2 %
NRBC BLD-RTO: 0 /100 WBC
PHOSPHATE SERPL-MCNC: 1.8 MG/DL
PLATELET # BLD AUTO: 233 K/UL
PMV BLD AUTO: 9.8 FL
POCT GLUCOSE: 249 MG/DL (ref 70–110)
POCT GLUCOSE: 304 MG/DL (ref 70–110)
POCT GLUCOSE: 338 MG/DL (ref 70–110)
POCT GLUCOSE: 360 MG/DL (ref 70–110)
POCT GLUCOSE: 448 MG/DL (ref 70–110)
POCT GLUCOSE: 456 MG/DL (ref 70–110)
POTASSIUM SERPL-SCNC: 3.4 MMOL/L
PROT SERPL-MCNC: 5.5 G/DL
RBC # BLD AUTO: 3.52 M/UL
SODIUM SERPL-SCNC: 140 MMOL/L
WBC # BLD AUTO: 8.79 K/UL

## 2018-09-28 PROCEDURE — 85025 COMPLETE CBC W/AUTO DIFF WBC: CPT

## 2018-09-28 PROCEDURE — 20600001 HC STEP DOWN PRIVATE ROOM

## 2018-09-28 PROCEDURE — 25000003 PHARM REV CODE 250: Performed by: STUDENT IN AN ORGANIZED HEALTH CARE EDUCATION/TRAINING PROGRAM

## 2018-09-28 PROCEDURE — G8998 SWALLOW D/C STATUS: HCPCS | Mod: CH

## 2018-09-28 PROCEDURE — 92526 ORAL FUNCTION THERAPY: CPT

## 2018-09-28 PROCEDURE — 63600175 PHARM REV CODE 636 W HCPCS: Performed by: STUDENT IN AN ORGANIZED HEALTH CARE EDUCATION/TRAINING PROGRAM

## 2018-09-28 PROCEDURE — 97530 THERAPEUTIC ACTIVITIES: CPT

## 2018-09-28 PROCEDURE — 80053 COMPREHEN METABOLIC PANEL: CPT

## 2018-09-28 PROCEDURE — 83735 ASSAY OF MAGNESIUM: CPT

## 2018-09-28 PROCEDURE — G8997 SWALLOW GOAL STATUS: HCPCS | Mod: CH

## 2018-09-28 PROCEDURE — 84100 ASSAY OF PHOSPHORUS: CPT

## 2018-09-28 PROCEDURE — 94640 AIRWAY INHALATION TREATMENT: CPT

## 2018-09-28 PROCEDURE — G8996 SWALLOW CURRENT STATUS: HCPCS | Mod: CH

## 2018-09-28 PROCEDURE — 25000242 PHARM REV CODE 250 ALT 637 W/ HCPCS: Performed by: HOSPITALIST

## 2018-09-28 PROCEDURE — 18500000 HC LEAVE OF ABSENCE HOSPITAL SERVICES

## 2018-09-28 PROCEDURE — 27000221 HC OXYGEN, UP TO 24 HOURS

## 2018-09-28 PROCEDURE — 94761 N-INVAS EAR/PLS OXIMETRY MLT: CPT

## 2018-09-28 PROCEDURE — 99900035 HC TECH TIME PER 15 MIN (STAT)

## 2018-09-28 PROCEDURE — 97535 SELF CARE MNGMENT TRAINING: CPT

## 2018-09-28 PROCEDURE — 99233 SBSQ HOSP IP/OBS HIGH 50: CPT | Mod: ,,, | Performed by: HOSPITALIST

## 2018-09-28 PROCEDURE — 25000242 PHARM REV CODE 250 ALT 637 W/ HCPCS: Performed by: STUDENT IN AN ORGANIZED HEALTH CARE EDUCATION/TRAINING PROGRAM

## 2018-09-28 RX ORDER — POTASSIUM CHLORIDE 20 MEQ/1
20 TABLET, EXTENDED RELEASE ORAL 2 TIMES DAILY
Status: COMPLETED | OUTPATIENT
Start: 2018-09-28 | End: 2018-09-28

## 2018-09-28 RX ORDER — CLONIDINE 0.3 MG/24H
1 PATCH, EXTENDED RELEASE TRANSDERMAL
Status: DISCONTINUED | OUTPATIENT
Start: 2018-09-28 | End: 2018-10-02

## 2018-09-28 RX ORDER — PREDNISONE 10 MG/1
10 TABLET ORAL 2 TIMES DAILY
Status: DISCONTINUED | OUTPATIENT
Start: 2018-09-28 | End: 2018-10-02 | Stop reason: HOSPADM

## 2018-09-28 RX ORDER — POLYETHYLENE GLYCOL 3350 17 G/17G
17 POWDER, FOR SOLUTION ORAL DAILY PRN
Status: DISCONTINUED | OUTPATIENT
Start: 2018-09-28 | End: 2018-10-02 | Stop reason: HOSPADM

## 2018-09-28 RX ORDER — INSULIN ASPART 100 [IU]/ML
7 INJECTION, SOLUTION INTRAVENOUS; SUBCUTANEOUS ONCE
Status: COMPLETED | OUTPATIENT
Start: 2018-09-28 | End: 2018-09-28

## 2018-09-28 RX ORDER — PRAZOSIN HYDROCHLORIDE 1 MG/1
2 CAPSULE ORAL 2 TIMES DAILY PRN
Status: DISCONTINUED | OUTPATIENT
Start: 2018-09-28 | End: 2018-09-29

## 2018-09-28 RX ORDER — SODIUM,POTASSIUM PHOSPHATES 280-250MG
2 POWDER IN PACKET (EA) ORAL
Status: DISCONTINUED | OUTPATIENT
Start: 2018-09-28 | End: 2018-09-29

## 2018-09-28 RX ADMIN — CLONIDINE 1 PATCH: 0.3 PATCH TRANSDERMAL at 10:09

## 2018-09-28 RX ADMIN — HEPARIN SODIUM 5000 UNITS: 5000 INJECTION, SOLUTION INTRAVENOUS; SUBCUTANEOUS at 05:09

## 2018-09-28 RX ADMIN — METHYLPREDNISOLONE SODIUM SUCCINATE 60 MG: 40 INJECTION, POWDER, FOR SOLUTION INTRAMUSCULAR; INTRAVENOUS at 10:09

## 2018-09-28 RX ADMIN — PIPERACILLIN SODIUM AND TAZOBACTAM SODIUM 4.5 G: 4; .5 INJECTION, POWDER, LYOPHILIZED, FOR SOLUTION INTRAVENOUS at 02:09

## 2018-09-28 RX ADMIN — SIMVASTATIN 40 MG: 20 TABLET, FILM COATED ORAL at 09:09

## 2018-09-28 RX ADMIN — CLOPIDOGREL 75 MG: 75 TABLET, FILM COATED ORAL at 10:09

## 2018-09-28 RX ADMIN — IPRATROPIUM BROMIDE AND ALBUTEROL SULFATE 3 ML: .5; 3 SOLUTION RESPIRATORY (INHALATION) at 03:09

## 2018-09-28 RX ADMIN — POTASSIUM & SODIUM PHOSPHATES POWDER PACK 280-160-250 MG 2 PACKET: 280-160-250 PACK at 09:09

## 2018-09-28 RX ADMIN — PREDNISONE 10 MG: 10 TABLET ORAL at 09:09

## 2018-09-28 RX ADMIN — FUROSEMIDE 40 MG: 40 TABLET ORAL at 10:09

## 2018-09-28 RX ADMIN — INSULIN ASPART 5 UNITS: 100 INJECTION, SOLUTION INTRAVENOUS; SUBCUTANEOUS at 05:09

## 2018-09-28 RX ADMIN — IPRATROPIUM BROMIDE AND ALBUTEROL SULFATE 3 ML: .5; 3 SOLUTION RESPIRATORY (INHALATION) at 11:09

## 2018-09-28 RX ADMIN — ASPIRIN 81 MG: 81 TABLET, COATED ORAL at 10:09

## 2018-09-28 RX ADMIN — HEPARIN SODIUM 5000 UNITS: 5000 INJECTION, SOLUTION INTRAVENOUS; SUBCUTANEOUS at 09:09

## 2018-09-28 RX ADMIN — IPRATROPIUM BROMIDE AND ALBUTEROL SULFATE 3 ML: .5; 3 SOLUTION RESPIRATORY (INHALATION) at 08:09

## 2018-09-28 RX ADMIN — IPRATROPIUM BROMIDE AND ALBUTEROL SULFATE 3 ML: .5; 3 SOLUTION RESPIRATORY (INHALATION) at 04:09

## 2018-09-28 RX ADMIN — POTASSIUM CHLORIDE 20 MEQ: 1500 TABLET, EXTENDED RELEASE ORAL at 09:09

## 2018-09-28 RX ADMIN — INSULIN ASPART 3 UNITS: 100 INJECTION, SOLUTION INTRAVENOUS; SUBCUTANEOUS at 09:09

## 2018-09-28 RX ADMIN — PYRIDOXINE HCL TAB 50 MG 50 MG: 50 TAB at 10:09

## 2018-09-28 RX ADMIN — LOSARTAN POTASSIUM 100 MG: 50 TABLET, FILM COATED ORAL at 10:09

## 2018-09-28 RX ADMIN — VANCOMYCIN HYDROCHLORIDE 1250 MG: 10 INJECTION, POWDER, LYOPHILIZED, FOR SOLUTION INTRAVENOUS at 12:09

## 2018-09-28 RX ADMIN — POTASSIUM CHLORIDE 20 MEQ: 1500 TABLET, EXTENDED RELEASE ORAL at 10:09

## 2018-09-28 RX ADMIN — HEPARIN SODIUM 5000 UNITS: 5000 INJECTION, SOLUTION INTRAVENOUS; SUBCUTANEOUS at 02:09

## 2018-09-28 RX ADMIN — INSULIN ASPART 2 UNITS: 100 INJECTION, SOLUTION INTRAVENOUS; SUBCUTANEOUS at 08:09

## 2018-09-28 RX ADMIN — ACETAMINOPHEN 1000 MG: 500 TABLET ORAL at 10:09

## 2018-09-28 RX ADMIN — PRAZOSIN HYDROCHLORIDE 4 MG: 1 CAPSULE ORAL at 10:09

## 2018-09-28 RX ADMIN — PANTOPRAZOLE SODIUM 40 MG: 40 TABLET, DELAYED RELEASE ORAL at 10:09

## 2018-09-28 RX ADMIN — DILTIAZEM HYDROCHLORIDE 240 MG: 120 CAPSULE, COATED, EXTENDED RELEASE ORAL at 10:09

## 2018-09-28 RX ADMIN — ACETAMINOPHEN 1000 MG: 500 TABLET ORAL at 09:09

## 2018-09-28 RX ADMIN — PIPERACILLIN SODIUM AND TAZOBACTAM SODIUM 4.5 G: 4; .5 INJECTION, POWDER, LYOPHILIZED, FOR SOLUTION INTRAVENOUS at 09:09

## 2018-09-28 RX ADMIN — INSULIN ASPART 7 UNITS: 100 INJECTION, SOLUTION INTRAVENOUS; SUBCUTANEOUS at 02:09

## 2018-09-28 RX ADMIN — FLUTICASONE FUROATE AND VILANTEROL TRIFENATATE 1 PUFF: 100; 25 POWDER RESPIRATORY (INHALATION) at 10:09

## 2018-09-28 RX ADMIN — PIPERACILLIN SODIUM AND TAZOBACTAM SODIUM 4.5 G: 4; .5 INJECTION, POWDER, LYOPHILIZED, FOR SOLUTION INTRAVENOUS at 05:09

## 2018-09-28 RX ADMIN — INSULIN ASPART 4 UNITS: 100 INJECTION, SOLUTION INTRAVENOUS; SUBCUTANEOUS at 12:09

## 2018-09-28 NOTE — PT/OT/SLP PROGRESS
Speech Language Pathology Treatment    Patient Name:  Sheryl Martines   MRN:  [id number]  Admitting Diagnosis: Altered mental status    Recommendations:                 General Recommendations:  Follow-up not indicated  Diet recommendations:  Regular, Liquid Diet Level: Thin   Aspiration Precautions: Standard aspiration precautions   General Precautions: Standard, fall, contact  Communication strategies:  none    Subjective     Awake; alert. Seated upright in bedside chair.     Pain/Comfort:  · Pain Rating 1: 0/10    Objective:     Has the patient been evaluated by SLP for swallowing?   Yes  Keep patient NPO? No   Current Respiratory Status: nasal cannula      Patient seen for follow up diet assessment. Patient placed on regular diabetic diet post last ST attempt to see patient. Patient with no overt signs of airway compromise during thin liquid trials via straw sip x5, regular solid trials via 1/2 cracker, along with multiple PO meds in straw sips thin liquids. Patient provided skilled education regarding discharge status from acute level ST, ongoing standard aspiration precautions of which to follow, and to continue regular diet consistencies. Patient verbalized understanding.     Assessment:     Sheryl Martines is a 63 y.o. female with an SLP diagnosis of no oropharyngeal dysphagia.     Goals:   Multidisciplinary Problems     SLP Goals        Problem: SLP Goal    Goal Priority Disciplines Outcome   SLP Goal     SLP Revised   Description:  Speech Language Pathology Goals  Goals expected to be met by 10/2:   1) Pt will undergo further swallow assessment to determine safest yet least restrictive diet.   2) Education on compensatory strategies, s/s of aspiration, aspiration precautions and role of SLP.     Goals expected to be met by 10/1  1. Pt will tolerate puree & thin liquids without s/s aspiration  2. Pt will tolerate trials of solids to determine when appropriate for diet upgrade                     Plan:      · Patient to be seen:  5 x/week   · Plan of Care expires:  10/23/18  · Plan of Care reviewed with:  patient   · SLP Follow-Up:  No       Discharge recommendations:  nursing facility, skilled     Time Tracking:     SLP Treatment Date:   09/28/18  Speech Start Time:  1110  Speech Stop Time:  1119     Speech Total Time (min):  9 min    Billable Minutes: Treatment Swallowing Dysfunction 9    Marta Soto M.S., CCC-SLP  Speech Language Pathologist  [phone]  09/28/2018

## 2018-09-28 NOTE — ASSESSMENT & PLAN NOTE
Pt presents from SNF with AMS and abdo pain (although pt denies AMS and abdominal pain during review of systems).  - CT head showed no acute path  - Daughter believes pt is altered also, poor speech articulation and concentration. Believes may be due to opioid medications  - Infectious workup negative thus far, opioids held but no tangible improvement in mental status    Plan:  - Started Vanc and Zosyn, continue until pelvic fluid collection has resolved on imaging follow up as per ID recs  - Low dose maintenance fluids as poor cooperation to PO intake  - Continue to hold opioids  - Delirium precautions  - Consider neurology consult  - Continue vancomycin and Zosyn until repeat imaging per ID recs  - Repeat CT head 9/26 without acute abnormality, CXR also NAD  - Likely CO2 narcosis, as pt with respiratory acidosis with pH 7.31 pCO2 of 68  - AMS markedly improved following bipap  - Will encourage nightly CPAP to prevent CO2 retention

## 2018-09-28 NOTE — PHYSICIAN QUERY
PT Name: Sheryl Martines  MR #: [id number]    Physician Query Form - Atrial Fibrillation Specificity     CDS: Nisha Montoya RN, CDI    Contact information:[email or web address]     This form is a permanent document in the medical record.     Query Date: September 28, 2018    By submitting this query, we are merely seeking further clarification of documentation. Please utilize your independent clinical judgment when addressing the question(s) below.    The medical record contains the following:   Indicators     Supporting Clinical Findings Location in Medical Record   x Atrial Fibrillation Atrial fibrillation  AF onset 9/24 AM, will be 48hrs tomorrow morning  RVR today with HR to ~115 and -170s  - Increased dilt from 180 to 240mg today (9/25), rate control has been much better <100 in following days  - Currently on Heparin 5000U q8  - Contraindication for BB due to severe asthma on long term PO pred and albuterol nebs   HM PN 9/27   x EKG results Atrial fibrillation  Left axis deviation  Anteroseptal infarct ,age undetermined  Abnormal ECG  When compared with ECG of  Nonspecific T wave abnormality now evident in Anterior leads EKG Results 9/24 0625   x Medication diltiaZEM 24 hr capsule 180 mg  Ordered Dose: 180 mg   Route: Oral   Frequency: Daily    diltiaZEM 24 hr capsule 240 mg     Ordered Dose: 240 mg   Route: Oral   Frequency: Daily MAR- Given 9/24, 9/25          MAR- Given 9/27, 9/28    Treatment      Other         Provider, please further specify the Atrial Fibrillation diagnosis.    [x  ] Paroxysmal    [  ] Permanent    [  ] Persistent    [  ] Other (please specify): ____________________________    [  ] Clinically Undetermined    Please document in your progress notes daily for the duration of treatment until resolved, and include in your discharge summary.

## 2018-09-28 NOTE — MEDICAL/APP STUDENT
[facility] Medicine  Progress Note    Patient Name: Sheryl Martines  MRN: [id number]  Patient Class: IP- Inpatient   Admission Date: 9/23/2018  Length of Stay: 3 days  Attending Physician: Keyla Martell MD  Primary Care Provider: [facility] Medicine Team: [facility] [place]    Subjective:     Principal Problem:Altered mental status    HPI:     Ms. Martines is a 63-year-old female with recent appendicitis s/p appendectomy, COPD, IDDM, CAD, pontine stroke (2012) presents from SNF for intermittent altered mental status (per SNF) and lethargy of 2 days. Patient denies AMS herself and is attributing drowsiness to pain medications.   She also c/o loose stool and dizziness. She denied fever, chills, headache, chest pain, SOB, nausea, vomiting, dysuria.     Patient currently living in rehab facility for reconditioning and treatment of wound infection s/p appendectomy on 8/26. On previous admission, she presented with slurred speech and AMS. EEG was done at the time and did not show any seizure activity. She found to have appendicitis with peritonitis, and underwent open appendectomy. Her abdominal wound cultures positive for  MRSA, enterococcus, & e. coli.  Also positive for c. diff during admission. She was discharged with PO vanc (stop Sept 13), clinda (1 week), and flagyl (stop Sept 13) per ID/ surgery.    At SNF, she was on Ertapenem (started 9/20) and Vancomycin (started 9/21) until this transfered to ED for present admission.       Hospital Course:     9/25 General surgery consulted and do not believe pt has current surgical complication or secondary infection. Open appy wound site is now healed and not infected at present, intrabdominal RLQ pelvic collection (believed to be hematoma or seroma) is shrinking on abdo CT comparison to previous imaging and not convincing for infection given no WCC or fever per gen surg. However pt does continue to have intermittent  tachycardia to ~115 but is hemodynamically stable with -170s. CT-H is negative for acute intracranial process. Blood cultures are NGTD. Pt mental status still not at baseline per daughter. Is responsive and AAOx3, but poor concentration and easily distractible with poor speech articulation. Opioids have been held due to concern for delirium. Made NPO today per SLP recs due to concern for aspiration risk, continue to assess     Pt is being continued on Zosyn and Vanc despite no clear infectious source isolated due to AMS in setting of pelvic collection, ID recs to continue current Abs until pelvic collection has cleared as confirmed on followup imaging.      9/27  Rapid response called yesterday morning due to decreased responsiveness / non-verbal, significant decrease from baseline on admission. Found to be in CO2 narcosis with respiratory acidosis on ABG (pH 7.31 CO2 69) started on bipap, follow up ABG pH 7.41 CO2 49. Today AMS resolved, however patient developed hypertensive crisis with  and new onset headache, had missed her anti-hypertensive medications the day before due to being NPO for aspiration risk. Regular meds given and single dose hydralazine 25mg also given. Started on clonidine 0.3mg patch due to inadequate BP control (still -180 even before missed medication doses). BP now 144/69 HR 94     EEG showed mild encephalitis, was negative for epileptiform activity    STAT CT head, CXR, ABG, CBC orders.   CT Head: No acute large vascular territory infarct or intracranial hemorrhage identified  CXR: No acute process. No interval worsening  ABG showed respiratory acidosis with pCO2 of 68, patient stated on BiPAP      Interval History:   Per nurses Pt's glucose remained extremely elevated throughout the shift to up 636, started on insulin gtt at 4am. QHR surgar checks. Obtained an urinalysis. Patient was alert and oriented throughout the night. Patient was c/o back pain but denied any other  symptoms. She was unwilling to open her eyes today. Patient refused the CPAP.  She states that she has some difficult breathing without her NC      Review of Systems   Constitutional: Positive for activity change. Negative for chills, fatigue and fever.   HENT: Negative for rhinorrhea, sneezing and sore throat.    Eyes: Positive for discharge. Negative for photophobia, pain and visual disturbance.   Respiratory: Negative for apnea, cough, chest tightness, shortness of breath, wheezing and stridor.    Cardiovascular: Negative for chest pain and leg swelling.   Gastrointestinal: Negative for abdominal distention, abdominal pain, constipation, diarrhea, nausea and vomiting.   Endocrine: Negative for polydipsia, polyphagia and polyuria.   Genitourinary: Negative for decreased urine volume, dysuria and urgency.   Musculoskeletal: Positive for arthralgias and myalgias.   Neurological: Negative for dizziness, weakness, light-headedness, numbness and headaches.   Psychiatric/Behavioral: Negative for agitation and confusion.   All other systems reviewed and are negative.    Objective:     Vital Signs (Most Recent):  Temp: 98.4 °F (36.9 °C) (09/29/18 0807)  Pulse: 89 (09/29/18 0911)  Resp: 20 (09/29/18 0911)  BP: (!) 169/81 (09/29/18 0807)  SpO2: 98 % (09/29/18 0807) Vital Signs (24h Range):  Temp:  [97.6 °F (36.4 °C)-99.8 °F (37.7 °C)] 97.6 °F (36.4 °C)  Pulse:  [] 68  Resp:  [16-20] 16  SpO2:  [92 %-100 %] 99 %  BP: (133-231)/(59-95) 163/74     Weight: 74.6 kg (164 lb 6.4 oz)  Body mass index is 32.11 kg/m².    Intake/Output Summary (Last 24 hours) at 9/28/2018 0745  Last data filed at 9/27/2018 1800  Gross per 24 hour   Intake 740 ml   Output --   Net 740 ml      Physical Exam   Constitutional: She is oriented to person, place, and time. She is cooperative. No distress. Nasal cannula in place.   HENT:   Head: Normocephalic and atraumatic.   Eyes: Conjunctivae and EOM are normal. Pupils are equal, round, and reactive  to light. Right eye exhibits discharge. Left eye exhibits discharge.   Neck: Normal range of motion. Neck supple.   Cardiovascular: Normal rate, regular rhythm, S1 normal, S2 normal and normal heart sounds.   No murmur heard.  Pulmonary/Chest: Effort normal and breath sounds normal. No respiratory distress. She has no wheezes. She exhibits no tenderness.   Abdominal: Soft. Bowel sounds are normal. She exhibits no distension. There is tenderness.       Musculoskeletal: She exhibits tenderness. She exhibits no edema.   Neurological: She is alert and oriented to person, place, and time.   Skin: Skin is warm and dry.   Psychiatric: She has a normal mood and affect.       Significant Labs: {results:65574}  Recent Results (from the past 24 hour(s))   POCT glucose    Collection Time: 09/27/18 10:46 AM   Result Value Ref Range    POCT Glucose 144 (H) 70 - 110 mg/dL   POCT glucose    Collection Time: 09/27/18 12:55 PM   Result Value Ref Range    POCT Glucose 153 (H) 70 - 110 mg/dL   Clostridium difficile EIA    Collection Time: 09/27/18  2:03 PM   Result Value Ref Range    C. diff Antigen Negative Negative    C difficile Toxins A+B, EIA Negative Negative   POCT glucose    Collection Time: 09/27/18  5:23 PM   Result Value Ref Range    POCT Glucose 362 (H) 70 - 110 mg/dL   POCT glucose    Collection Time: 09/27/18  9:05 PM   Result Value Ref Range    POCT Glucose 451 (HH) 70 - 110 mg/dL   POCT glucose    Collection Time: 09/27/18 11:02 PM   Result Value Ref Range    POCT Glucose 382 (H) 70 - 110 mg/dL   POCT glucose    Collection Time: 09/28/18 12:53 AM   Result Value Ref Range    POCT Glucose 360 (H) 70 - 110 mg/dL   POCT glucose    Collection Time: 09/28/18  3:45 AM   Result Value Ref Range    POCT Glucose 338 (H) 70 - 110 mg/dL   Comprehensive Metabolic Panel (CMP)    Collection Time: 09/28/18  5:07 AM   Result Value Ref Range    Sodium 140 136 - 145 mmol/L    Potassium 3.4 (L) 3.5 - 5.1 mmol/L    Chloride 105 95 - 110  mmol/L    CO2 29 23 - 29 mmol/L    Glucose 294 (H) 70 - 110 mg/dL    BUN, Bld 23 8 - 23 mg/dL    Creatinine 1.3 0.5 - 1.4 mg/dL    Calcium 8.9 8.7 - 10.5 mg/dL    Total Protein 5.5 (L) 6.0 - 8.4 g/dL    Albumin 2.4 (L) 3.5 - 5.2 g/dL    Total Bilirubin 0.4 0.1 - 1.0 mg/dL    Alkaline Phosphatase 68 55 - 135 U/L    AST 9 (L) 10 - 40 U/L    ALT 16 10 - 44 U/L    Anion Gap 6 (L) 8 - 16 mmol/L    eGFR if African American 50.5 (A) >60 mL/min/1.73 m^2    eGFR if non  43.8 (A) >60 mL/min/1.73 m^2   Magnesium    Collection Time: 09/28/18  5:07 AM   Result Value Ref Range    Magnesium 1.9 1.6 - 2.6 mg/dL   Phosphorus    Collection Time: 09/28/18  5:07 AM   Result Value Ref Range    Phosphorus 1.8 (L) 2.7 - 4.5 mg/dL   CBC auto differential    Collection Time: 09/28/18  5:07 AM   Result Value Ref Range    WBC 8.79 3.90 - 12.70 K/uL    RBC 3.52 (L) 4.00 - 5.40 M/uL    Hemoglobin 9.2 (L) 12.0 - 16.0 g/dL    Hematocrit 30.7 (L) 37.0 - 48.5 %    MCV 87 82 - 98 fL    MCH 26.1 (L) 27.0 - 31.0 pg    MCHC 30.0 (L) 32.0 - 36.0 g/dL    RDW 18.6 (H) 11.5 - 14.5 %    Platelets 233 150 - 350 K/uL    MPV 9.8 9.2 - 12.9 fL    Immature Granulocytes 1.0 (H) 0.0 - 0.5 %    Gran # (ANC) 7.1 1.8 - 7.7 K/uL    Immature Grans (Abs) 0.09 (H) 0.00 - 0.04 K/uL    Lymph # 1.1 1.0 - 4.8 K/uL    Mono # 0.4 0.3 - 1.0 K/uL    Eos # 0.0 0.0 - 0.5 K/uL    Baso # 0.01 0.00 - 0.20 K/uL    nRBC 0 0 /100 WBC    Gran% 81.2 (H) 38.0 - 73.0 %    Lymph% 12.9 (L) 18.0 - 48.0 %    Mono% 4.8 4.0 - 15.0 %    Eosinophil% 0.0 0.0 - 8.0 %    Basophil% 0.1 0.0 - 1.9 %    Differential Method Automated        Significant Imaging: I have reviewed all pertinent imaging results/findings within the past 24 hours.    Assessment/Plan:     * Altered mental status     Pt presents from SNF with AMS and abdo pain (although pt denies AMS and abdominal pain during review of systems).  - CT head showed no acute path  - Daughter believes pt is altered also, poor speech  articulation and concentration. Believes may be due to opioid medications  - Infectious workup negative thus far, opioids held but no tangible improvement in mental status     Plan:  - Started Vanc and Zosyn, continue until pelvic fluid collection has resolved on imaging follow up as per ID recs  - Low dose maintenance fluids as poor cooperation to PO intake  - Continue to hold opioids  - Delirium precautions  - Consider neurology consult  - Repeat CT head 9/26 without acute abnormality, CXR also NAD  - Continue vancomycin and Zosyn until repeat imaging per ID recs  - Likely CO2 narcosis, as pt with respiratory acidosis with pCO2 of 68  - Solumedrol 60mg IV q6h per CC recs -> decreased to 60mg q12   - 24 hr EEG shows mild encephalitis without epileptiform activity          Atrial fibrillation     AF onset 9/24 AM, will be 48hrs tomorrow morning  RVR today with HR to ~115 and -170s  - Increased dilt from 180 to 240mg today (9/25), rate control has been much better <100 in following days  - Currently on Heparin 5000U q8  - Contraindication for BB due to severe asthma on long term PO pred and albuterol nebs          Essential hypertension     9/27 - Continues to have very labile Bps throughout admission. Hypertensive crisis late this morning with  and new onset headache, due to missed medication dosing yesterday because NPO due to aspiration risk. Pt given 25mg hydralazine in addition to regular medication dosing:  - losartan 100mg  - Prazosin 5mg  - lasix PO 40 od  - Dilt 180 --> 240mg od  - Started clonidine 0.3mg patch due to poor BP control even before missed dosing (-180 spikes throughout hospital stay)  - Will monitor BP, potentially decreasing prazosin dose if hypotensive episodes develop             Gastroesophageal reflux disease without esophagitis     - Pantoprazole 40 mg              Debilitated patient     - PT/OT             (HFpEF) heart failure with preserved ejection fraction      ESSENTIAL HYPERTENSION     Last ECHO 8/23/18 showing EF 60-65%, grade 1 diastolic dysfunction      - Lasix 40 mg daily  - Losartan 100 mg daily  - Prazosin 5 mg BID  - Dilt 180mg -> 240mg                Moderate asthma without complication     - Breo daily  - Theophylline 200 mg daily  -  Albuterol PRN  - Pt was on prednisone 10mg daily per daughter  - Relative contraindication for beta blockers in HTN/AF mgmt given significant difficult to control asthma on daily oral pred  - Start duonebs q6h wake  - Start Solumedrol 60mg IV q6h per Critical Care recs  - 9/27 decreased to solumedrol 60mg q12h, plan return to usual PO pred 10mg tomorrow                   S/P appendectomy     Pt recently hospitalized for appendicitis with peritonitis. She had open appendectomy 8/26 with wound cultures positive for MRSA, enterococcus, and E.col. She was discharged with PO vanc (stop Sept 13), clinda (1 week), and flagyl (stop Sept 13).   At SNF , she was on Ertapenem (started 9/20) and Vancomycin (started 9/21) until this transfered to ED for present admission     A/P per gen surg:  - Unconvinced of SBP/ acute abdomen, or surgical site infection - no WCC no fever, no signs infection at incision site  - RLQ pelvic fluid collection on CT but has been shrinking on its own when compared to previous imaging, do not recommend draining at this time. Likely hematoma or seroma  - Continue Vanc and Zosyn per ID until resolution of pelvic fluid collection on follow up imaging          Clostridium difficile infection     Recent hospitalization positive for C. Diff infection. Treated with PO vanc and flagyl.   Pt presently reporting loose, but not watery, stool     - Repeat C. Diff panel negative             NIKHIL (acute kidney injury)     Cr 1.4 on admit. Baseline 0.8. Likely pre-renal 2/2 poor intake in setting of AMS  Trending 1.4 > 0.9 > 1.0 > 0.9 > 0.8  - UA NAD  - +2.9L/24 hrs after admission  - Patient refusing PO intake, family expressing  concern pt not drinking. Started on slow maintenance fluids  - Monitor BMP daily                Closed compression fracture of fourth lumbar vertebra     Pt with chronic back pain. S/p spinal stimulator placement     - Avoiding opiates for now because of AMS  - Baclofen PRN for pain  - Started gabapentin   - Holding baclofen and gabapentin in setting of AMS          CAD (coronary artery disease)     - ASA 81  - Plavix 75  - Simvastatin 40              Insulin dependent diabetes mellitus     - Continue insulin detemir 7u BID  - Continue LDSSI  - Diabetic diet  - Glucose well controlled 109-163 throughout admission  - Monitor for hypo/hyperglycemia            Active Diagnoses:    Diagnosis Date Noted POA    PRINCIPAL PROBLEM:  Altered mental status [R41.82] 09/23/2018 Yes    Atrial fibrillation [I48.91]  Yes    Tachycardia [R00.0]  Yes    Encephalopathy, metabolic [G93.41]  Yes    Abdominal infection [K65.9]  Yes    Essential hypertension [I10] 09/21/2018 Yes    Gastroesophageal reflux disease without esophagitis [K21.9] 09/21/2018 Yes    Debilitated patient [R53.81]  Yes    S/P appendectomy [Z90.49] 09/11/2018 Not Applicable    Moderate asthma without complication [J45.909] 09/11/2018 Yes    (HFpEF) heart failure with preserved ejection fraction [I50.30] 09/11/2018 Yes    Clostridium difficile infection [B96.89] 08/30/2018 Yes    NIKHIL (acute kidney injury) [N17.9]  Unknown    Insulin dependent diabetes mellitus [E11.9, Z79.4] 08/23/2018 Not Applicable    CAD (coronary artery disease) [I25.10] 08/23/2018 Yes    Closed compression fracture of fourth lumbar vertebra [S32.040A] 08/23/2018 Yes      Problems Resolved During this Admission:     VTE Risk Mitigation (From admission, onward)        Ordered     heparin, porcine (PF) 100 unit/mL injection flush 500 Units  As needed (PRN)      09/24/18 0145     heparin (porcine) injection 5,000 Units  Every 8 hours      09/23/18 2100             Ly  Ascencion  Department of [facility] Medicine   [facility]

## 2018-09-28 NOTE — SUBJECTIVE & OBJECTIVE
Interval History: AMS markedly improved today, spontaneous eye opening, fully coherent and AAOx3, baseline mentation. Likely previous AMS due to CO2 retention, as improvement followed session on bipap.     Review of Systems   Constitutional: Negative for chills, diaphoresis and fever.   Eyes: Negative for photophobia and visual disturbance.   Respiratory: Negative for cough, shortness of breath and wheezing.    Cardiovascular: Negative for palpitations and leg swelling.   Gastrointestinal: Positive for abdominal pain. Negative for constipation, diarrhea, nausea and vomiting.   Genitourinary: Negative for dysuria and flank pain.   Musculoskeletal: Negative for arthralgias.   Skin: Positive for wound (open appy scar LLQ healing well). Negative for rash.   Neurological: Positive for weakness. Negative for syncope and headaches.   Psychiatric/Behavioral: Negative for agitation, confusion and decreased concentration. The patient is not nervous/anxious.      Objective:     Vital Signs (Most Recent):  Temp: 98 °F (36.7 °C) (09/28/18 1234)  Pulse: 81 (09/28/18 1234)  Resp: 16 (09/28/18 1234)  BP: (!) 176/86 (09/28/18 1234)  SpO2: 99 % (09/28/18 1234) Vital Signs (24h Range):  Temp:  [97.6 °F (36.4 °C)-99.8 °F (37.7 °C)] 98 °F (36.7 °C)  Pulse:  [] 81  Resp:  [16-20] 16  SpO2:  [92 %-100 %] 99 %  BP: (133-181)/(59-86) 176/86     Weight: 74.6 kg (164 lb 6.4 oz)  Body mass index is 32.11 kg/m².    Intake/Output Summary (Last 24 hours) at 9/28/2018 1317  Last data filed at 9/27/2018 1800  Gross per 24 hour   Intake 740 ml   Output --   Net 740 ml      Physical Exam    Significant Labs:   CBC:   Recent Labs   Lab  09/27/18 0456  09/28/18   0507   WBC  7.41  8.79   HGB  10.7*  9.2*   HCT  36.1*  30.7*   PLT  242  233     CMP:   Recent Labs   Lab  09/27/18   0456  09/28/18   0507   NA  141  140   K  4.4  3.4*   CL  105  105   CO2  27  29   GLU  133*  294*   BUN  18  23   CREATININE  0.8  1.3   CALCIUM  9.5  8.9   PROT  6.0   5.5*   ALBUMIN  2.5*  2.4*   BILITOT  0.5  0.4   ALKPHOS  78  68   AST  14  9*   ALT  22  16   ANIONGAP  9  6*   EGFRNONAA  >60.0  43.8*     Recent Lab Results       09/28/18  1157 09/28/18  0804 09/28/18  0507 09/28/18  0345 09/28/18  0053      Immature Granulocytes   1.0       Immature Grans (Abs)   0.09  Comment:  Mild elevation in immature granulocytes is non specific and   can be seen in a variety of conditions including stress response,   acute inflammation, trauma and pregnancy. Correlation with other   laboratory and clinical findings is essential.         Albumin   2.4       Alkaline Phosphatase   68       ALT   16       Anion Gap   6       AST   9       Baso #   0.01       Basophil%   0.1       Total Bilirubin   0.4  Comment:  For infants and newborns, interpretation of results should be based  on gestational age, weight and in agreement with clinical  observations.  Premature Infant recommended reference ranges:  Up to 24 hours.............<8.0 mg/dL  Up to 48 hours............<12.0 mg/dL  3-5 days..................<15.0 mg/dL  6-29 days.................<15.0 mg/dL         BUN, Bld   23       C difficile Toxins A+B, EIA          C. diff Antigen          Calcium   8.9       Chloride   105       CO2   29       Creatinine   1.3       Differential Method   Automated       eGFR if    50.5       eGFR if non    43.8  Comment:  Calculation used to obtain the estimated glomerular filtration  rate (eGFR) is the CKD-EPI equation.          Eos #   0.0       Eosinophil%   0.0       Glucose   294       Gran # (ANC)   7.1       Gran%   81.2       Hematocrit   30.7       Hemoglobin   9.2       Lymph #   1.1       Lymph%   12.9       Magnesium   1.9       MCH   26.1       MCHC   30.0       MCV   87       Mono #   0.4       Mono%   4.8       MPV   9.8       nRBC   0       Phosphorus   1.8       Platelets   233       POCT Glucose 304 249  338 360     Potassium   3.4       Total Protein   5.5        RBC   3.52       RDW   18.6       Sodium   140       WBC   8.79                   09/27/18  2302 09/27/18  2105 09/27/18  1723 09/27/18  1403      Immature Granulocytes         Immature Grans (Abs)         Albumin         Alkaline Phosphatase         ALT         Anion Gap         AST         Baso #         Basophil%         Total Bilirubin         BUN, Bld         C difficile Toxins A+B, EIA    Negative  Comment:  Testing not recommended for children <24 months old.     C. diff Antigen    Negative     Calcium         Chloride         CO2         Creatinine         Differential Method         eGFR if          eGFR if non          Eos #         Eosinophil%         Glucose         Gran # (ANC)         Gran%         Hematocrit         Hemoglobin         Lymph #         Lymph%         Magnesium         MCH         MCHC         MCV         Mono #         Mono%         MPV         nRBC         Phosphorus         Platelets         POCT Glucose 382 451 362      Potassium         Total Protein         RBC         RDW         Sodium         WBC               Significant Imaging: I have reviewed all pertinent imaging results/findings within the past 24 hours.

## 2018-09-28 NOTE — PLAN OF CARE
Problem: Patient Care Overview  Goal: Plan of Care Review  Outcome: Ongoing (interventions implemented as appropriate)  Pt titrated to 2L nc. SPO2 100%. Hydralazine ordered for SBP >200. Pt alert and oriented x4. Diet advanced to Diabetic. Tolerating well. No difficulty swallowing. Bipap ordered to be worn nightly. Telemetry and continuous SPO2 monitoring in place.

## 2018-09-28 NOTE — PLAN OF CARE
Problem: Occupational Therapy Goal  Goal: Occupational Therapy Goal  Goals to be met by: 10/9/18    Patient will increase functional independence with ADLs by performing:    Feeding with Minimal Assistance.  UE Dressing with Minimal Assistance.  LE Dressing with Maximum assistance. MET 9/28  REVISED to SBA.  Grooming while seated at sink with Minimal Assistance.  Toileting from bedside commode with Moderate Assistance for hygiene and clothing management.   Supine to sit with Minimal Assistance. MET 9/28  REVISED to SBA.  Toilet transfer to toilet with Moderate Assistance.     Outcome: Ongoing (interventions implemented as appropriate)  Con't POC.    NAVI Wiggins

## 2018-09-28 NOTE — PLAN OF CARE
Problem: Patient Care Overview  Goal: Plan of Care Review  Outcome: Ongoing (interventions implemented as appropriate)  VSS. No complaints of pain.  A/Ox4.  BS was high this PM, notified on call and gave patient extra insulin with blood sugar checks an hour after extra dose was given.  BS started at 451 and came down with the last one being 338.  See MAR for more details.  Patient refused to wear bipap at night.  Stayed on 2L NC sating 95% or higher.  C. Diff sample came back negative.  Isolation was d/c'd.  No acute events overnight. Safety maintained.  All questions answered. Patient updated on plan of care.  Will continue to monitor.

## 2018-09-28 NOTE — ASSESSMENT & PLAN NOTE
Cr 1.4 on admit. Baseline 0.8. Likely pre-renal 2/2 poor intake in setting of AMS  Trending 1.4 > 0.9 > 1.0 > 0.9 > 0.8 -> 1.3  - UA NAD  - +2.9L/24 hrs after admission  - Patient refusing PO intake, family expressing concern pt not drinking. Started on slow maintenance fluids  - Monitor BMP daily  - 9/28 maintenance fluids d/c with pt improved mental status

## 2018-09-28 NOTE — PROGRESS NOTES
[facility] Hospital Medicine  Progress Note    Patient Name: Sheryl Martines  MRN: [id number]  Patient Class: IP- Inpatient   Admission Date: 9/23/2018  Length of Stay: 3 days  Attending Physician: Keyla Martell MD  Primary Care Provider: Primary Doctor [facility] Medicine Team: [facility] [place] [facility] 1 Wayne Morris MD    Subjective:     Principal Problem:Altered mental status    HPI:  Ms. Martines is a 63-year-old female with recent appendicitis s/p appendectomy, COPD, IDDM, CAD, pontine stroke (2012) presents from SNF for intermittent altered mental status (per SNF) and lethargy of 2 days. Patient denies AMS herself and is attributing drowsiness to pain medications.   She also c/o loose stool and dizziness. She denied fever, chills, headache, chest pain, SOB, nausea, vomiting, dysuria.    Patient currently living in rehab facility for reconditioning and treatment of wound infection s/p appendectomy on 8/26. On previous admission, she presented with slurred speech and AMS. EEG was done at the time and did not show any seizure activity. She found to have appendicitis with peritonitis, and underwent open appendectomy. Her abdominal wound cultures positive for  MRSA, enterococcus, & e. coli.  Also positive for c. diff during admission. She was discharged with PO vanc (stop Sept 13), clinda (1 week), and flagyl (stop Sept 13) per ID/ surgery.    At SNF, she was on Ertapenem (started 9/20) and Vancomycin (started 9/21) until this transfered to ED for present admission.      Hospital Course:  General surgery consulted and do not believe pt has current surgical complication or secondary infection. Open appy wound site is now healed and not infected at present, intrabdominal RLQ pelvic collection (believed to be hematoma or seroma) is shrinking on abdo CT comparison to previous imaging and not convincing for infection given no WCC or fever per gen surg. CT-H is negative for acute intracranial process. EEG  shows no epileptiform activity. Blood cultures are NGTD.  Made NPO temporarily per SLP recs due to concern for aspiration risk following CO2 narcosis 9/26 (pH 7.31 CO2 69) with decreased mentation. Pt started on bipap with resolution of acidosis and improvement of mental status. 9/28 mental status continued to improve and pt is now fully AAOx3 with good concentration, spontaneous eye opening, and ability to eat and drink. AMS at presentation likely due to CO2 retention. Pt is being continued on Zosyn and Vanc as per ID recs until pelvic collection has cleared as confirmed on followup imaging.     Interval History: AMS markedly improved today, spontaneous eye opening, fully coherent and AAOx3, baseline mentation. Likely previous AMS due to CO2 retention, as improvement followed session on bipap.     Review of Systems   Constitutional: Negative for chills, diaphoresis and fever.   Eyes: Negative for photophobia and visual disturbance.   Respiratory: Negative for cough, shortness of breath and wheezing.    Cardiovascular: Negative for palpitations and leg swelling.   Gastrointestinal: Positive for abdominal pain. Negative for constipation, diarrhea, nausea and vomiting.   Genitourinary: Negative for dysuria and flank pain.   Musculoskeletal: Negative for arthralgias.   Skin: Positive for wound (open appy scar LLQ healing well). Negative for rash.   Neurological: Positive for weakness. Negative for syncope and headaches.   Psychiatric/Behavioral: Negative for agitation, confusion and decreased concentration. The patient is not nervous/anxious.      Objective:     Vital Signs (Most Recent):  Temp: 98 °F (36.7 °C) (09/28/18 1234)  Pulse: 81 (09/28/18 1234)  Resp: 16 (09/28/18 1234)  BP: (!) 176/86 (09/28/18 1234)  SpO2: 99 % (09/28/18 1234) Vital Signs (24h Range):  Temp:  [97.6 °F (36.4 °C)-99.8 °F (37.7 °C)] 98 °F (36.7 °C)  Pulse:  [] 81  Resp:  [16-20] 16  SpO2:  [92 %-100 %] 99 %  BP: (133-181)/(59-86) 176/86      Weight: 74.6 kg (164 lb 6.4 oz)  Body mass index is 32.11 kg/m².    Intake/Output Summary (Last 24 hours) at 9/28/2018 1317  Last data filed at 9/27/2018 1800  Gross per 24 hour   Intake 740 ml   Output --   Net 740 ml      Physical Exam    Significant Labs:   CBC:   Recent Labs   Lab  09/27/18   0456  09/28/18   0507   WBC  7.41  8.79   HGB  10.7*  9.2*   HCT  36.1*  30.7*   PLT  242  233     CMP:   Recent Labs   Lab  09/27/18   0456  09/28/18   0507   NA  141  140   K  4.4  3.4*   CL  105  105   CO2  27  29   GLU  133*  294*   BUN  18  23   CREATININE  0.8  1.3   CALCIUM  9.5  8.9   PROT  6.0  5.5*   ALBUMIN  2.5*  2.4*   BILITOT  0.5  0.4   ALKPHOS  78  68   AST  14  9*   ALT  22  16   ANIONGAP  9  6*   EGFRNONAA  >60.0  43.8*     Recent Lab Results       09/28/18  1157 09/28/18  0804 09/28/18  0507 09/28/18  0345 09/28/18  0053      Immature Granulocytes   1.0       Immature Grans (Abs)   0.09  Comment:  Mild elevation in immature granulocytes is non specific and   can be seen in a variety of conditions including stress response,   acute inflammation, trauma and pregnancy. Correlation with other   laboratory and clinical findings is essential.         Albumin   2.4       Alkaline Phosphatase   68       ALT   16       Anion Gap   6       AST   9       Baso #   0.01       Basophil%   0.1       Total Bilirubin   0.4  Comment:  For infants and newborns, interpretation of results should be based  on gestational age, weight and in agreement with clinical  observations.  Premature Infant recommended reference ranges:  Up to 24 hours.............<8.0 mg/dL  Up to 48 hours............<12.0 mg/dL  3-5 days..................<15.0 mg/dL  6-29 days.................<15.0 mg/dL         BUN, Bld   23       C difficile Toxins A+B, EIA          C. diff Antigen          Calcium   8.9       Chloride   105       CO2   29       Creatinine   1.3       Differential Method   Automated       eGFR if    50.5       eGFR  if non    43.8  Comment:  Calculation used to obtain the estimated glomerular filtration  rate (eGFR) is the CKD-EPI equation.          Eos #   0.0       Eosinophil%   0.0       Glucose   294       Gran # (ANC)   7.1       Gran%   81.2       Hematocrit   30.7       Hemoglobin   9.2       Lymph #   1.1       Lymph%   12.9       Magnesium   1.9       MCH   26.1       MCHC   30.0       MCV   87       Mono #   0.4       Mono%   4.8       MPV   9.8       nRBC   0       Phosphorus   1.8       Platelets   233       POCT Glucose 304 249  338 360     Potassium   3.4       Total Protein   5.5       RBC   3.52       RDW   18.6       Sodium   140       WBC   8.79                   09/27/18  2302 09/27/18  2105 09/27/18  1723 09/27/18  1403      Immature Granulocytes         Immature Grans (Abs)         Albumin         Alkaline Phosphatase         ALT         Anion Gap         AST         Baso #         Basophil%         Total Bilirubin         BUN, Bld         C difficile Toxins A+B, EIA    Negative  Comment:  Testing not recommended for children <24 months old.     C. diff Antigen    Negative     Calcium         Chloride         CO2         Creatinine         Differential Method         eGFR if          eGFR if non          Eos #         Eosinophil%         Glucose         Gran # (ANC)         Gran%         Hematocrit         Hemoglobin         Lymph #         Lymph%         Magnesium         MCH         MCHC         MCV         Mono #         Mono%         MPV         nRBC         Phosphorus         Platelets         POCT Glucose 382 451 362      Potassium         Total Protein         RBC         RDW         Sodium         WBC               Significant Imaging: I have reviewed all pertinent imaging results/findings within the past 24 hours.    Assessment/Plan:      * Altered mental status    Pt presents from SNF with AMS and abdo pain (although pt denies AMS and abdominal pain  during review of systems).  - CT head showed no acute path  - Daughter believes pt is altered also, poor speech articulation and concentration. Believes may be due to opioid medications  - Infectious workup negative thus far, opioids held but no tangible improvement in mental status    Plan:  - Started Vanc and Zosyn, continue until pelvic fluid collection has resolved on imaging follow up as per ID recs  - Low dose maintenance fluids as poor cooperation to PO intake  - Continue to hold opioids  - Delirium precautions  - Consider neurology consult  - Continue vancomycin and Zosyn until repeat imaging per ID recs  - Repeat CT head 9/26 without acute abnormality, CXR also NAD  - Likely CO2 narcosis, as pt with respiratory acidosis with pH 7.31 pCO2 of 68  - AMS markedly improved following bipap  - Will encourage nightly CPAP to prevent CO2 retention        Atrial fibrillation    Paroxysmal AF onset 9/24 AM  RVR today with HR to ~115 and -170s  - Increased dilt from 180 to 240mg today (9/25), rate control has been much better <100 in following days  - Currently on Heparin 5000U q8  - Contraindication for BB due to severe asthma on long term PO pred and albuterol nebs        Essential hypertension    9/27 - Continues to have very labile Bps throughout admission. Hypertensive crisis late this morning with  and new onset headache, due to missed medication dosing yesterday because NPO due to aspiration risk. Pt given 25mg hydralazine in addition to regular medication dosing:  - losartan 100mg  - Prazosin 5mg  - lasix PO 40 od  - Dilt 180 --> 240mg od  - Started clonidine 0.3mg patch due to poor BP control even before missed dosing (-180 spikes throughout hospital stay)  - Will monitor BP, prazosin moved to PRN dosing today          Gastroesophageal reflux disease without esophagitis    - Pantoprazole 40 mg           Debilitated patient    - PT/OT          (HFpEF) heart failure with preserved ejection  fraction    ESSENTIAL HYPERTENSION    Last ECHO 8/23/18 showing EF 60-65%, grade 1 diastolic dysfunction     - Lasix 40 mg daily  - Losartan 100 mg daily  - Prazosin 5 mg BID  - Dilt 180mg -> 240mg            Moderate asthma without complication    - Breo daily  - Theophylline 200 mg daily  -  Albuterol PRN  - Pt was on prednisone 10mg daily per daughter  - Relative contraindication for beta blockers in HTN/AF mgmt given significant difficult to control asthma on daily oral pred  - Start duonebs q6h wake  - Start Solumedrol 60mg IV q6h per Critical Care recs  - 9/27 decreased to solumedrol 60mg q12h, plan return to usual PO pred 10mg today              S/P appendectomy    Pt recently hospitalized for appendicitis with peritonitis. She had open appendectomy 8/26 with wound cultures positive for MRSA, enterococcus, and E.col. She was discharged with PO vanc (stop Sept 13), clinda (1 week), and flagyl (stop Sept 13).   At SNF , she was on Ertapenem (started 9/20) and Vancomycin (started 9/21) until this transfered to ED for present admission    A/P per gen surg:  - Unconvinced of SBP/ acute abdomen, or surgical site infection - no WCC no fever, no signs infection at incision site  - RLQ pelvic fluid collection on CT but has been shrinking on its own when compared to previous imaging, do not recommend draining at this time. Likely hematoma or seroma  - Continue Vanc and Zosyn per ID until resolution of pelvic fluid collection on follow up imaging        Clostridium difficile infection    Recent hospitalization positive for C. Diff infection. Treated with PO vanc and flagyl.   Pt presently reporting loose, but not watery, stool    - Repeat C. Diff panel negative          NIKHIL (acute kidney injury)    Cr 1.4 on admit. Baseline 0.8. Likely pre-renal 2/2 poor intake in setting of AMS  Trending 1.4 > 0.9 > 1.0 > 0.9 > 0.8 -> 1.3  - UA NAD  - +2.9L/24 hrs after admission  - Patient refusing PO intake, family expressing concern  pt not drinking. Started on slow maintenance fluids  - Monitor BMP daily  - 9/28 maintenance fluids d/c with pt improved mental status            Closed compression fracture of fourth lumbar vertebra    Pt with chronic back pain. S/p spinal stimulator placement    - Avoiding opiates for now because of AMS  - Baclofen PRN for pain  - Started gabapentin   - Holding baclofen and gabapentin in setting of AMS        CAD (coronary artery disease)    - ASA 81  - Plavix 75  - Simvastatin 40           Insulin dependent diabetes mellitus    - Continue insulin detemir 7u BID  - Continue LDSSI  - Diabetic diet  - Monitor for hypo/hyperglycemia  - Hyperglycemia to 451 overnight in the setting of increased IV steroid dosing for respiratory acidosis and suspected asthma exacerbation, given 27U lantus and 12U detemir overnight -->   - Currently being weaned off of IV methylpred 60mg bd back onto PO pred 10mg bd home dose, will likely need additional prandial insulin in the interim.             VTE Risk Mitigation (From admission, onward)        Ordered     heparin, porcine (PF) 100 unit/mL injection flush 500 Units  As needed (PRN)      09/24/18 0145     heparin (porcine) injection 5,000 Units  Every 8 hours      09/23/18 2100              Wayne Morris MD  Department of Hospital Medicine   [facility]                    09/28/2018                             STAFF PHYSICIAN NOTE                                   Attending Attestation for Rounds with Resident  I have reviewed and concur with the resident's history, physical, assessment, and plan.  I have personally interviewed and examined the patient at bedside and agree with the resident's findings.                                  ________________________________________                                     REASON FOR ADMISSION:     Patient is 63 y.o.female    Body mass index is 32.11 kg/m².,  Altered mental status

## 2018-09-28 NOTE — PROCEDURES
DATE OF STUDY:  09/27/2018    EEG NUMBER:  [id number].    REQUESTING PHYSICIAN:  Keyla Martell M.D.    LOCATION OF SERVICE:  80.    ICU EEG AND VIDEO MONITORING REPORT    METHODOLOGY:  Electroencephalographic (EEG) is recorded with electrodes placed   according to the International 10-20 placement system.  Thirty two (32) channels   of digital signal (sampling rate of 512/sec), including T1 and T2, were   simultaneously recorded from the scalp and may include EKG, EMG, and/or eye   monitors.  Recording band pass was 0.1 to 512 Hz.  Digital video recording of   the patient is simultaneously recorded with the EEG.  The patient is instructed   to report clinical symptoms which may occur during the recording session.  EEG   and video recording are stored and archived in digital format.  Activation   procedures, which include photic stimulation, hyperventilation and instructing   patients to perform simple tasks, are done in selected patients.    The EEG is displayed on a monitor screen and can be reviewed using different   montages.  Computer-assisted analysis is employed to detect spike and   electrographic seizure activity.  The entire record is submitted for computer   analysis.  The entire recording is visually reviewed, and the times identified   by computer analysis as being spikes or seizures are reviewed again.    Compressed spectral analysis (CSA) is also performed on the activity recorded   from each individual channel.  This is displayed as a power display of   frequencies from 0 to 30 Hz over time.  The CSA is reviewed looking for   asymmetries in power between homologous areas of the scalp, then compared with   the original EEG recording.    [employer] software was also utilized in the review of this study.  This software   suite analyzes the EEG recording in multiple domains.  Coherence and rhythmicity   are computed to identify EEG sections which may contain organized seizures.    Each channel  undergoes analysis to detect the presence of spike and sharp waves   which have special and morphological characteristics of epileptic activity.  The   routine EEG recording is converted from special into frequency domain.  This is   then displayed comparing homologous areas to identify areas of significant   asymmetry.  Algorithm to identify non-cortically generated artifact is used to   separate artifact from the EEG.    RECORDING TIMES:  Start on 09/27/2018, at 07:00.  End on 09/27/2018, at 09:55.  A total of 2 hours and 55 minutes of EEG monitoring was obtained.    EEG FINDINGS:  Recording was obtained at the patient's bedside in the ICU.  She   was asleep at the onset and a diffuse mixture of theta and delta activity was   seen diffusely.  Upon awakening, a fairly rhythmic 7 Hz posterior dominant   rhythm was seen in the posterior head leads bilaterally with an irregular beta   present diffusely.  The waking and sleeping background was symmetrical over the   2 hemispheres.  There were no lateralized or focal changes and no spike or sharp   wave activity seen.  Activation procedures were not carried out.  The patient   did not experience any clinical symptoms during the recording.    IMPRESSION:  Mildly abnormal EEG.  The posterior dominant rhythm is slower than   normal indicating the presence of a mild nonspecific encephalopathy.  The   pattern does not indicate any specific cause and there is no evidence of focal   changes nor an epileptic process.      RR/IN  dd: 09/28/2018 14:15:17 (CDT)  td: 09/28/2018 14:34:32 (CDT)  Doc ID   #[id number]  Job ID #[id number]    CC:

## 2018-09-28 NOTE — ASSESSMENT & PLAN NOTE
Paroxysmal AF onset 9/24 AM  RVR today with HR to ~115 and -170s  - Increased dilt from 180 to 240mg today (9/25), rate control has been much better <100 in following days  - Currently on Heparin 5000U q8  - Contraindication for BB due to severe asthma on long term PO pred and albuterol nebs

## 2018-09-28 NOTE — PT/OT/SLP PROGRESS
Physical Therapy      Patient Name:  Sheryl Martines   MRN:  [id number]    Patient not seen today secondary to Other (Comment) pt receiving bath with PCT on first attempt, second attempt pt with respiratory. Will follow-up next scheduled treatment per PT POC    Edwardo Wu, PTA 9/28/2018

## 2018-09-28 NOTE — PT/OT/SLP PROGRESS
Occupational Therapy   Treatment    Name: Sheryl Martines  MRN: [id number]  Admitting Diagnosis:  Altered mental status       Recommendations:     Discharge Recommendations: nursing facility, skilled  Discharge Equipment Recommendations:     Barriers to discharge:       Subjective     Communicated with: RN prior to session.  Pain/Comfort:  ·      Patients cultural, spiritual, Gnosticism conflicts given the current situation:      Objective:     Patient found with:      General Precautions: Standard, contact, fall   Orthopedic Precautions:N/A   Braces:       Occupational Performance:    Bed Mobility:    · Patient completed Rolling/Turning to Left with  stand by assistance  · Patient completed Scooting/Bridging with contact guard assistance  · Patient completed Supine to Sit with contact guard assistance     Functional Mobility/Transfers:  · Patient completed Sit <> Stand Transfer with minimum assistance  with  rolling walker   · Patient completed Bed <> Chair Transfer using Step Transfer technique with minimum assistance with rolling walker  · Functional Mobility: Pt took a few steps from bed>chair with Min A using a RW.    Activities of Daily Living:  · Upper Body Dressing: moderate assistance   · Lower Body Dressing: minimum assistance by propping legs up on bed.    Patient left up in chair with all lines intact and call button in reach    AMPA 6 Click:  AMPAC Total Score: 18    Treatment & Education:  Good EOB sitting balance  Completed shld press and elbow flexion/extension --- x 10 reps.  Discussed POC and frequency of therapy.  Educated on use of call button for assistance.  Education:    Assessment:     Sheryl Martines is a 63 y.o. female with a medical diagnosis of Altered mental status.  She presents with decreased (I) with ADLs, functional mobility & t/fs as well as decreased overall strength, ROM, endurance and balance. Pt would benefit from skilled OT services as well as SNF placement to address these  deficits and to facilitate improving (I) with daily tasks.  Performance deficits affecting function are weakness, gait instability, impaired endurance, impaired balance, decreased safety awareness, impaired self care skills, impaired functional mobilty, decreased coordination, decreased lower extremity function.      Rehab Prognosis:  Good; patient would benefit from acute skilled OT services to address these deficits and reach maximum level of function.       Plan:     Patient to be seen 4 x/week to address the above listed problems via self-care/home management, therapeutic activities, therapeutic exercises  · Plan of Care Expires: 10/23/18  · Plan of Care Reviewed with: patient    This Plan of care has been discussed with the patient who was involved in its development and understands and is in agreement with the identified goals and treatment plan    GOALS:   Multidisciplinary Problems     Occupational Therapy Goals        Problem: Occupational Therapy Goal    Goal Priority Disciplines Outcome Interventions   Occupational Therapy Goal     OT, PT/OT Ongoing (interventions implemented as appropriate)    Description:  Goals to be met by: 10/9/18    Patient will increase functional independence with ADLs by performing:    Feeding with Minimal Assistance.  UE Dressing with Minimal Assistance.  LE Dressing with Maximum assistance. MET 9/28  REVISED to SBA.  Grooming while seated at sink with Minimal Assistance.  Toileting from bedside commode with Moderate Assistance for hygiene and clothing management.   Supine to sit with Minimal Assistance. MET 9/28  REVISED to SBA.  Toilet transfer to toilet with Moderate Assistance.                       Time Tracking:     OT Date of Treatment: 09/28/18  OT Start Time: 0959  OT Stop Time: 1040  OT Total Time (min): 41 min    Billable Minutes:Self Care/Home Management 25  Therapeutic Activity 16    NAVI Wiggins  9/28/2018

## 2018-09-28 NOTE — ASSESSMENT & PLAN NOTE
9/27 - Continues to have very labile Bps throughout admission. Hypertensive crisis late this morning with  and new onset headache, due to missed medication dosing yesterday because NPO due to aspiration risk. Pt given 25mg hydralazine in addition to regular medication dosing:  - losartan 100mg  - Prazosin 5mg  - lasix PO 40 od  - Dilt 180 --> 240mg od  - Started clonidine 0.3mg patch due to poor BP control even before missed dosing (-180 spikes throughout hospital stay)  - Will monitor BP, prazosin moved to PRN dosing today

## 2018-09-28 NOTE — ASSESSMENT & PLAN NOTE
- Continue insulin detemir 7u BID  - Continue LDSSI  - Diabetic diet  - Monitor for hypo/hyperglycemia  - Hyperglycemia to 451 overnight in the setting of increased IV steroid dosing for respiratory acidosis and suspected asthma exacerbation, given 27U lantus and 12U detemir overnight -->   - Currently being weaned off of IV methylpred 60mg bd back onto PO pred 10mg bd home dose, will likely need additional prandial insulin in the interim.

## 2018-09-29 LAB
ALBUMIN SERPL BCP-MCNC: 2.5 G/DL
ALLENS TEST: ABNORMAL
ALP SERPL-CCNC: 63 U/L
ALT SERPL W/O P-5'-P-CCNC: 16 U/L
ANION GAP SERPL CALC-SCNC: 4 MMOL/L
ANION GAP SERPL CALC-SCNC: 8 MMOL/L
ANION GAP SERPL CALC-SCNC: 8 MMOL/L
ANION GAP SERPL CALC-SCNC: 9 MMOL/L
AST SERPL-CCNC: 10 U/L
B-OH-BUTYR BLD STRIP-SCNC: 0 MMOL/L
BACTERIA #/AREA URNS AUTO: NORMAL /HPF
BASOPHILS # BLD AUTO: 0.01 K/UL
BASOPHILS NFR BLD: 0.1 %
BILIRUB SERPL-MCNC: 0.5 MG/DL
BILIRUB UR QL STRIP: NEGATIVE
BUN SERPL-MCNC: 11 MG/DL
BUN SERPL-MCNC: 20 MG/DL
BUN SERPL-MCNC: 21 MG/DL
BUN SERPL-MCNC: 21 MG/DL
CALCIUM SERPL-MCNC: 6.9 MG/DL
CALCIUM SERPL-MCNC: 8.2 MG/DL
CALCIUM SERPL-MCNC: 8.2 MG/DL
CALCIUM SERPL-MCNC: 8.3 MG/DL
CHLORIDE SERPL-SCNC: 103 MMOL/L
CHLORIDE SERPL-SCNC: 103 MMOL/L
CHLORIDE SERPL-SCNC: 104 MMOL/L
CHLORIDE SERPL-SCNC: 115 MMOL/L
CLARITY UR REFRACT.AUTO: CLEAR
CO2 SERPL-SCNC: 28 MMOL/L
COLOR UR AUTO: ABNORMAL
CREAT SERPL-MCNC: 0.8 MG/DL
CREAT SERPL-MCNC: 1.4 MG/DL
CREAT SERPL-MCNC: 1.6 MG/DL
CREAT SERPL-MCNC: 1.6 MG/DL
DELSYS: ABNORMAL
DIFFERENTIAL METHOD: ABNORMAL
EOSINOPHIL # BLD AUTO: 0 K/UL
EOSINOPHIL NFR BLD: 0 %
ERYTHROCYTE [DISTWIDTH] IN BLOOD BY AUTOMATED COUNT: 18.7 %
EST. GFR  (AFRICAN AMERICAN): 39.3 ML/MIN/1.73 M^2
EST. GFR  (AFRICAN AMERICAN): 39.3 ML/MIN/1.73 M^2
EST. GFR  (AFRICAN AMERICAN): 46.1 ML/MIN/1.73 M^2
EST. GFR  (AFRICAN AMERICAN): >60 ML/MIN/1.73 M^2
EST. GFR  (NON AFRICAN AMERICAN): 34 ML/MIN/1.73 M^2
EST. GFR  (NON AFRICAN AMERICAN): 34 ML/MIN/1.73 M^2
EST. GFR  (NON AFRICAN AMERICAN): 40 ML/MIN/1.73 M^2
EST. GFR  (NON AFRICAN AMERICAN): >60 ML/MIN/1.73 M^2
GLUCOSE SERPL-MCNC: 169 MG/DL
GLUCOSE SERPL-MCNC: 472 MG/DL
GLUCOSE SERPL-MCNC: 596 MG/DL
GLUCOSE SERPL-MCNC: 596 MG/DL
GLUCOSE SERPL-MCNC: 636 MG/DL
GLUCOSE UR QL STRIP: ABNORMAL
HAPTOGLOB SERPL-MCNC: 239 MG/DL
HCO3 UR-SCNC: 30.4 MMOL/L (ref 24–28)
HCT VFR BLD AUTO: 29.8 %
HGB BLD-MCNC: 8.8 G/DL
HGB UR QL STRIP: ABNORMAL
IMM GRANULOCYTES # BLD AUTO: 0.07 K/UL
IMM GRANULOCYTES NFR BLD AUTO: 0.8 %
KETONES UR QL STRIP: NEGATIVE
LDH SERPL L TO P-CCNC: 199 U/L
LEUKOCYTE ESTERASE UR QL STRIP: NEGATIVE
LYMPHOCYTES # BLD AUTO: 0.5 K/UL
LYMPHOCYTES NFR BLD: 5.7 %
MAGNESIUM SERPL-MCNC: 1.9 MG/DL
MCH RBC QN AUTO: 26.3 PG
MCHC RBC AUTO-ENTMCNC: 29.5 G/DL
MCV RBC AUTO: 89 FL
MICROSCOPIC COMMENT: NORMAL
MONOCYTES # BLD AUTO: 0.5 K/UL
MONOCYTES NFR BLD: 5 %
NEUTROPHILS # BLD AUTO: 7.9 K/UL
NEUTROPHILS NFR BLD: 88.4 %
NITRITE UR QL STRIP: NEGATIVE
NRBC BLD-RTO: 0 /100 WBC
OSMOLALITY SERPL: 304 MOSM/KG
PCO2 BLDA: 49 MMHG (ref 35–45)
PH SMN: 7.4 [PH] (ref 7.35–7.45)
PH UR STRIP: 6 [PH] (ref 5–8)
PHOSPHATE SERPL-MCNC: 2.1 MG/DL
PLATELET # BLD AUTO: 225 K/UL
PMV BLD AUTO: 10.3 FL
PO2 BLDA: 40 MMHG (ref 40–60)
POC BE: 6 MMOL/L
POC SATURATED O2: 74 % (ref 95–100)
POC TCO2: 32 MMOL/L (ref 24–29)
POCT GLUCOSE: 197 MG/DL (ref 70–110)
POCT GLUCOSE: 198 MG/DL (ref 70–110)
POCT GLUCOSE: 213 MG/DL (ref 70–110)
POCT GLUCOSE: 216 MG/DL (ref 70–110)
POCT GLUCOSE: 216 MG/DL (ref 70–110)
POCT GLUCOSE: 217 MG/DL (ref 70–110)
POCT GLUCOSE: 238 MG/DL (ref 70–110)
POCT GLUCOSE: 280 MG/DL (ref 70–110)
POCT GLUCOSE: 307 MG/DL (ref 70–110)
POCT GLUCOSE: 310 MG/DL (ref 70–110)
POCT GLUCOSE: 392 MG/DL (ref 70–110)
POCT GLUCOSE: 417 MG/DL (ref 70–110)
POCT GLUCOSE: 453 MG/DL (ref 70–110)
POTASSIUM SERPL-SCNC: 2.8 MMOL/L
POTASSIUM SERPL-SCNC: 3.7 MMOL/L
POTASSIUM SERPL-SCNC: 4.1 MMOL/L
PROT SERPL-MCNC: 5.4 G/DL
PROT UR QL STRIP: NEGATIVE
RBC # BLD AUTO: 3.35 M/UL
RBC #/AREA URNS AUTO: 0 /HPF (ref 0–4)
SAMPLE: ABNORMAL
SITE: ABNORMAL
SODIUM SERPL-SCNC: 139 MMOL/L
SODIUM SERPL-SCNC: 139 MMOL/L
SODIUM SERPL-SCNC: 141 MMOL/L
SODIUM SERPL-SCNC: 147 MMOL/L
SP GR UR STRIP: 1.02 (ref 1–1.03)
SQUAMOUS #/AREA URNS AUTO: 0 /HPF
URN SPEC COLLECT METH UR: ABNORMAL
UROBILINOGEN UR STRIP-ACNC: NEGATIVE EU/DL
VANCOMYCIN TROUGH SERPL-MCNC: 18.3 UG/ML
WBC # BLD AUTO: 8.94 K/UL
WBC #/AREA URNS AUTO: 1 /HPF (ref 0–5)
YEAST UR QL AUTO: NORMAL

## 2018-09-29 PROCEDURE — 63600175 PHARM REV CODE 636 W HCPCS: Performed by: STUDENT IN AN ORGANIZED HEALTH CARE EDUCATION/TRAINING PROGRAM

## 2018-09-29 PROCEDURE — 25000003 PHARM REV CODE 250: Performed by: STUDENT IN AN ORGANIZED HEALTH CARE EDUCATION/TRAINING PROGRAM

## 2018-09-29 PROCEDURE — 80053 COMPREHEN METABOLIC PANEL: CPT

## 2018-09-29 PROCEDURE — 83735 ASSAY OF MAGNESIUM: CPT

## 2018-09-29 PROCEDURE — 94640 AIRWAY INHALATION TREATMENT: CPT

## 2018-09-29 PROCEDURE — 25000242 PHARM REV CODE 250 ALT 637 W/ HCPCS: Performed by: HOSPITALIST

## 2018-09-29 PROCEDURE — 93010 ELECTROCARDIOGRAM REPORT: CPT | Mod: ,,, | Performed by: INTERNAL MEDICINE

## 2018-09-29 PROCEDURE — 81001 URINALYSIS AUTO W/SCOPE: CPT

## 2018-09-29 PROCEDURE — 83930 ASSAY OF BLOOD OSMOLALITY: CPT

## 2018-09-29 PROCEDURE — 80202 ASSAY OF VANCOMYCIN: CPT

## 2018-09-29 PROCEDURE — 27000221 HC OXYGEN, UP TO 24 HOURS

## 2018-09-29 PROCEDURE — 94761 N-INVAS EAR/PLS OXIMETRY MLT: CPT

## 2018-09-29 PROCEDURE — 93005 ELECTROCARDIOGRAM TRACING: CPT

## 2018-09-29 PROCEDURE — 83615 LACTATE (LD) (LDH) ENZYME: CPT

## 2018-09-29 PROCEDURE — 82947 ASSAY GLUCOSE BLOOD QUANT: CPT

## 2018-09-29 PROCEDURE — 84100 ASSAY OF PHOSPHORUS: CPT

## 2018-09-29 PROCEDURE — 85025 COMPLETE CBC W/AUTO DIFF WBC: CPT

## 2018-09-29 PROCEDURE — 83010 ASSAY OF HAPTOGLOBIN QUANT: CPT

## 2018-09-29 PROCEDURE — 94660 CPAP INITIATION&MGMT: CPT

## 2018-09-29 PROCEDURE — 18500000 HC LEAVE OF ABSENCE HOSPITAL SERVICES

## 2018-09-29 PROCEDURE — 20600001 HC STEP DOWN PRIVATE ROOM

## 2018-09-29 PROCEDURE — 82010 KETONE BODYS QUAN: CPT

## 2018-09-29 PROCEDURE — 93010 ELECTROCARDIOGRAM REPORT: CPT | Mod: 76,,, | Performed by: INTERNAL MEDICINE

## 2018-09-29 PROCEDURE — 99233 SBSQ HOSP IP/OBS HIGH 50: CPT | Mod: ,,, | Performed by: HOSPITALIST

## 2018-09-29 PROCEDURE — 99900035 HC TECH TIME PER 15 MIN (STAT)

## 2018-09-29 PROCEDURE — 80048 BASIC METABOLIC PNL TOTAL CA: CPT | Mod: 91

## 2018-09-29 RX ORDER — DEXTROSE MONOHYDRATE AND SODIUM CHLORIDE 5; .45 G/100ML; G/100ML
INJECTION, SOLUTION INTRAVENOUS CONTINUOUS
Status: DISCONTINUED | OUTPATIENT
Start: 2018-09-29 | End: 2018-09-29

## 2018-09-29 RX ORDER — INSULIN ASPART 100 [IU]/ML
1-10 INJECTION, SOLUTION INTRAVENOUS; SUBCUTANEOUS
Status: DISCONTINUED | OUTPATIENT
Start: 2018-09-29 | End: 2018-10-02

## 2018-09-29 RX ORDER — CALCIUM CARBONATE 200(500)MG
1000 TABLET,CHEWABLE ORAL 2 TIMES DAILY
Status: DISCONTINUED | OUTPATIENT
Start: 2018-09-29 | End: 2018-10-02 | Stop reason: HOSPADM

## 2018-09-29 RX ORDER — POTASSIUM CHLORIDE 20 MEQ/15ML
40 SOLUTION ORAL ONCE
Status: COMPLETED | OUTPATIENT
Start: 2018-09-29 | End: 2018-09-29

## 2018-09-29 RX ORDER — HYDRALAZINE HYDROCHLORIDE 25 MG/1
25 TABLET, FILM COATED ORAL EVERY 8 HOURS
Status: DISCONTINUED | OUTPATIENT
Start: 2018-09-29 | End: 2018-10-02

## 2018-09-29 RX ORDER — SODIUM CHLORIDE 9 MG/ML
INJECTION, SOLUTION INTRAVENOUS CONTINUOUS
Status: ACTIVE | OUTPATIENT
Start: 2018-09-29 | End: 2018-09-29

## 2018-09-29 RX ORDER — ONDANSETRON 4 MG/1
4 TABLET, ORALLY DISINTEGRATING ORAL ONCE
Status: COMPLETED | OUTPATIENT
Start: 2018-09-29 | End: 2018-09-29

## 2018-09-29 RX ORDER — SODIUM CHLORIDE 9 MG/ML
INJECTION, SOLUTION INTRAVENOUS CONTINUOUS
Status: ACTIVE | OUTPATIENT
Start: 2018-09-29 | End: 2018-09-30

## 2018-09-29 RX ORDER — GLUCAGON 1 MG
1 KIT INJECTION
Status: DISCONTINUED | OUTPATIENT
Start: 2018-09-29 | End: 2018-10-02 | Stop reason: HOSPADM

## 2018-09-29 RX ADMIN — SODIUM CHLORIDE 1 UNITS/HR: 9 INJECTION, SOLUTION INTRAVENOUS at 03:09

## 2018-09-29 RX ADMIN — SODIUM CHLORIDE 0.7 UNITS/HR: 9 INJECTION, SOLUTION INTRAVENOUS at 12:09

## 2018-09-29 RX ADMIN — FUROSEMIDE 40 MG: 40 TABLET ORAL at 09:09

## 2018-09-29 RX ADMIN — SODIUM CHLORIDE 1.5 UNITS/HR: 9 INJECTION, SOLUTION INTRAVENOUS at 02:09

## 2018-09-29 RX ADMIN — CALCIUM CARBONATE (ANTACID) CHEW TAB 500 MG 1000 MG: 500 CHEW TAB at 09:09

## 2018-09-29 RX ADMIN — HEPARIN SODIUM 5000 UNITS: 5000 INJECTION, SOLUTION INTRAVENOUS; SUBCUTANEOUS at 01:09

## 2018-09-29 RX ADMIN — HEPARIN SODIUM 5000 UNITS: 5000 INJECTION, SOLUTION INTRAVENOUS; SUBCUTANEOUS at 06:09

## 2018-09-29 RX ADMIN — PIPERACILLIN SODIUM AND TAZOBACTAM SODIUM 4.5 G: 4; .5 INJECTION, POWDER, LYOPHILIZED, FOR SOLUTION INTRAVENOUS at 01:09

## 2018-09-29 RX ADMIN — SODIUM CHLORIDE: 0.9 INJECTION, SOLUTION INTRAVENOUS at 07:09

## 2018-09-29 RX ADMIN — SIMVASTATIN 40 MG: 20 TABLET, FILM COATED ORAL at 09:09

## 2018-09-29 RX ADMIN — FLUTICASONE FUROATE AND VILANTEROL TRIFENATATE 1 PUFF: 100; 25 POWDER RESPIRATORY (INHALATION) at 09:09

## 2018-09-29 RX ADMIN — IPRATROPIUM BROMIDE AND ALBUTEROL SULFATE 3 ML: .5; 3 SOLUTION RESPIRATORY (INHALATION) at 03:09

## 2018-09-29 RX ADMIN — HEPARIN SODIUM 5000 UNITS: 5000 INJECTION, SOLUTION INTRAVENOUS; SUBCUTANEOUS at 09:09

## 2018-09-29 RX ADMIN — PANTOPRAZOLE SODIUM 40 MG: 40 TABLET, DELAYED RELEASE ORAL at 09:09

## 2018-09-29 RX ADMIN — INSULIN DETEMIR 20 UNITS: 100 INJECTION, SOLUTION SUBCUTANEOUS at 02:09

## 2018-09-29 RX ADMIN — DILTIAZEM HYDROCHLORIDE 240 MG: 120 CAPSULE, COATED, EXTENDED RELEASE ORAL at 09:09

## 2018-09-29 RX ADMIN — IPRATROPIUM BROMIDE AND ALBUTEROL SULFATE 3 ML: .5; 3 SOLUTION RESPIRATORY (INHALATION) at 11:09

## 2018-09-29 RX ADMIN — PREDNISONE 10 MG: 10 TABLET ORAL at 09:09

## 2018-09-29 RX ADMIN — POTASSIUM CHLORIDE 40 MEQ: 20 SOLUTION ORAL at 11:09

## 2018-09-29 RX ADMIN — HYDRALAZINE HYDROCHLORIDE 25 MG: 25 TABLET ORAL at 09:09

## 2018-09-29 RX ADMIN — PIPERACILLIN SODIUM AND TAZOBACTAM SODIUM 4.5 G: 4; .5 INJECTION, POWDER, LYOPHILIZED, FOR SOLUTION INTRAVENOUS at 09:09

## 2018-09-29 RX ADMIN — CLOPIDOGREL 75 MG: 75 TABLET, FILM COATED ORAL at 09:09

## 2018-09-29 RX ADMIN — SODIUM CHLORIDE: 0.9 INJECTION, SOLUTION INTRAVENOUS at 06:09

## 2018-09-29 RX ADMIN — ACETAMINOPHEN 1000 MG: 500 TABLET ORAL at 01:09

## 2018-09-29 RX ADMIN — IPRATROPIUM BROMIDE AND ALBUTEROL SULFATE 3 ML: .5; 3 SOLUTION RESPIRATORY (INHALATION) at 07:09

## 2018-09-29 RX ADMIN — INSULIN ASPART 2 UNITS: 100 INJECTION, SOLUTION INTRAVENOUS; SUBCUTANEOUS at 04:09

## 2018-09-29 RX ADMIN — SODIUM CHLORIDE 1.4 UNITS/HR: 9 INJECTION, SOLUTION INTRAVENOUS at 11:09

## 2018-09-29 RX ADMIN — SODIUM CHLORIDE 1.2 UNITS/HR: 9 INJECTION, SOLUTION INTRAVENOUS at 08:09

## 2018-09-29 RX ADMIN — SODIUM CHLORIDE 1.1 UNITS/HR: 9 INJECTION, SOLUTION INTRAVENOUS at 01:09

## 2018-09-29 RX ADMIN — ONDANSETRON 4 MG: 4 TABLET, ORALLY DISINTEGRATING ORAL at 04:09

## 2018-09-29 RX ADMIN — SODIUM CHLORIDE 1.4 UNITS/HR: 9 INJECTION, SOLUTION INTRAVENOUS at 06:09

## 2018-09-29 RX ADMIN — ASPIRIN 81 MG: 81 TABLET, COATED ORAL at 09:09

## 2018-09-29 RX ADMIN — PIPERACILLIN SODIUM AND TAZOBACTAM SODIUM 4.5 G: 4; .5 INJECTION, POWDER, LYOPHILIZED, FOR SOLUTION INTRAVENOUS at 04:09

## 2018-09-29 RX ADMIN — HYDRALAZINE HYDROCHLORIDE 25 MG: 25 TABLET ORAL at 01:09

## 2018-09-29 RX ADMIN — INSULIN ASPART 2 UNITS: 100 INJECTION, SOLUTION INTRAVENOUS; SUBCUTANEOUS at 09:09

## 2018-09-29 RX ADMIN — SODIUM CHLORIDE 1.8 UNITS/HR: 9 INJECTION, SOLUTION INTRAVENOUS at 06:09

## 2018-09-29 RX ADMIN — SODIUM CHLORIDE 1 UNITS/HR: 9 INJECTION, SOLUTION INTRAVENOUS at 09:09

## 2018-09-29 RX ADMIN — LOSARTAN POTASSIUM 100 MG: 50 TABLET, FILM COATED ORAL at 09:09

## 2018-09-29 RX ADMIN — SODIUM CHLORIDE 1.6 UNITS/HR: 9 INJECTION, SOLUTION INTRAVENOUS at 09:09

## 2018-09-29 RX ADMIN — VANCOMYCIN HYDROCHLORIDE 1250 MG: 10 INJECTION, POWDER, LYOPHILIZED, FOR SOLUTION INTRAVENOUS at 01:09

## 2018-09-29 RX ADMIN — PYRIDOXINE HCL TAB 50 MG 50 MG: 50 TAB at 09:09

## 2018-09-29 NOTE — ASSESSMENT & PLAN NOTE
9/27 - Continues to have very labile Bps throughout admission. Hypertensive crisis late this morning with  and new onset headache, due to missed medication dosing yesterday because NPO due to aspiration risk. Pt given 25mg hydralazine in addition to regular medication dosing:  - losartan 100mg  - Prazosin 5mg  - lasix PO 40 od  - Dilt 180 --> 240mg od  - Started clonidine 0.3mg patch due to poor BP control even before missed dosing (-180 spikes throughout hospital stay)  - prazosin d/c + started low dose hydralazine today, will assess pt tolerance

## 2018-09-29 NOTE — CARE UPDATE
Checked on pt about 1h ago, she was asleep, but easily arousable when calling her name, on NC, denies any sob or difficulty breathing or chest discomfort, answering questions appropriately.     Later when her blood glucose level came back, her BG was greater than>600, will transition her currently insulin regimen to insulin drip. Her hyperglycemia likely induced by new prednisone medication added yesterday.    D/w senior on call.

## 2018-09-29 NOTE — ASSESSMENT & PLAN NOTE
Cr 1.4 on admit. Baseline 0.8. Likely pre-renal 2/2 poor intake in setting of AMS  Trending 1.4 > 0.9 > 1.0 > 0.9 > 0.8 -> 1.3  - UA NAD  - +2.9L/24 hrs after admission  - Patient refusing PO intake, family expressing concern pt not drinking. Started on slow maintenance fluids  - Monitor BMP daily  - Pt w/ recurring NIKHIL whenever maintenance fluids stopped as refuses PO intake with AMS and frusemide for CHF and HTN. Have held frusemide for now and back on low dose maintenance.

## 2018-09-29 NOTE — ASSESSMENT & PLAN NOTE
- Continue insulin detemir 7u BID  - Continue LDSSI  - Diabetic diet  - Monitor for hypo/hyperglycemia  - Hyperglycemia to 451 overnight in the setting of increased IV steroid dosing for respiratory acidosis and suspected asthma exacerbation, given 27U lantus and 12U detemir overnight -->   - Currently being weaned off of IV methylpred 60mg bd back onto PO pred 10mg bd home dose yesterday  -  overnight 9/29, insulin gtt --> BGL 200s, transitioned to basal/prandial dosing will adjust as needed

## 2018-09-29 NOTE — PROGRESS NOTES
[facility] Hospital Medicine  Progress Note    Patient Name: Sheryl Martines  MRN: [id number]  Patient Class: IP- Inpatient   Admission Date: 9/23/2018  Length of Stay: 4 days  Attending Physician: Keyla Martell MD  Primary Care Provider: [facility] Medicine Team: [facility] MED 1 Wayne Morris MD    Subjective:     Principal Problem:Altered mental status    HPI:  Ms. Martines is a 63-year-old female with recent appendicitis s/p appendectomy, COPD, IDDM, CAD, pontine stroke (2012) presents from SNF for intermittent altered mental status (per SNF) and lethargy of 2 days. Patient denies AMS herself and is attributing drowsiness to pain medications.   She also c/o loose stool and dizziness. She denied fever, chills, headache, chest pain, SOB, nausea, vomiting, dysuria.    Patient currently living in rehab facility for reconditioning and treatment of wound infection s/p appendectomy on 8/26. On previous admission, she presented with slurred speech and AMS. EEG was done at the time and did not show any seizure activity. She found to have appendicitis with peritonitis, and underwent open appendectomy. Her abdominal wound cultures positive for  MRSA, enterococcus, & e. coli.  Also positive for c. diff during admission. She was discharged with PO vanc (stop Sept 13), clinda (1 week), and flagyl (stop Sept 13) per ID/ surgery.    At SNF, she was on Ertapenem (started 9/20) and Vancomycin (started 9/21) until this transfered to ED for present admission.      Hospital Course:  General surgery consulted and do not believe pt has current surgical complication or secondary infection. Open appy wound site is now healed and not infected at present, intrabdominal RLQ pelvic collection (believed to be hematoma or seroma) is shrinking on abdo CT comparison to previous imaging and not convincing for infection given no WCC or fever per gen surg. CT-H is negative for acute intracranial process. EEG  shows no epileptiform activity. Blood cultures are NGTD.  Made NPO temporarily per SLP recs due to concern for aspiration risk following CO2 narcosis 9/26 (pH 7.31 CO2 69) with decreased mentation. Pt started on bipap with resolution of acidosis and improvement of mental status. 9/28 mental status continued to improve and pt is now fully AAOx3 with good concentration, spontaneous eye opening, and ability to eat and drink. AMS at presentation likely due to CO2 retention. Pt is being continued on Zosyn and Vanc as per ID recs until pelvic collection has cleared as confirmed on followup imaging.     No new subjective & objective note has been filed under this hospital service since the last note was generated.    Assessment/Plan:      * Altered mental status    Pt presents from SNF with AMS and abdo pain (although pt denies AMS and abdominal pain during review of systems).  - CT head showed no acute path  - Daughter believes pt is altered also, poor speech articulation and concentration. Believes may be due to opioid medications  - Infectious workup negative thus far, opioids held but no tangible improvement in mental status    Plan:  - Started Vanc and Zosyn, continue until pelvic fluid collection has resolved on imaging follow up as per ID recs  - Low dose maintenance fluids as poor cooperation to PO intake  - Continue to hold opioids  - Delirium precautions  - Consider neurology consult  - Continue vancomycin and Zosyn until repeat imaging per ID recs  - Repeat CT head 9/26 without acute abnormality, CXR also NAD  - Likely CO2 narcosis, as pt with respiratory acidosis with pH 7.31 pCO2 of 68  - AMS markedly improved following bipap  - Will encourage nightly CPAP to prevent CO2 retention        Atrial fibrillation    Paroxysmal AF onset 9/24 AM  RVR today with HR to ~115 and -170s  - Increased dilt from 180 to 240mg today (9/25), rate control has been much better <100 in following days  - Currently on Heparin  5000U q8  - Contraindication for BB due to severe asthma on long term PO pred and albuterol nebs        Essential hypertension    9/27 - Continues to have very labile Bps throughout admission. Hypertensive crisis late this morning with  and new onset headache, due to missed medication dosing yesterday because NPO due to aspiration risk. Pt given 25mg hydralazine in addition to regular medication dosing:  - losartan 100mg  - Prazosin 5mg  - lasix PO 40 od  - Dilt 180 --> 240mg od  - Started clonidine 0.3mg patch due to poor BP control even before missed dosing (-180 spikes throughout hospital stay)  - prazosin d/c + started low dose hydralazine today, will assess pt tolerance        Gastroesophageal reflux disease without esophagitis    - Pantoprazole 40 mg           Debilitated patient    - PT/OT          (HFpEF) heart failure with preserved ejection fraction    ESSENTIAL HYPERTENSION    Last ECHO 8/23/18 showing EF 60-65%, grade 1 diastolic dysfunction     - Lasix 40 mg daily  - Losartan 100 mg daily  - Dilt 180mg -> 240mg  - Prazosin d/c  - low dose hydralazine, titrate as needed            Moderate asthma without complication    - Breo daily  - Theophylline 200 mg daily  -  Albuterol PRN  - Pt was on prednisone 10mg daily per daughter  - Relative contraindication for beta blockers in HTN/AF mgmt given significant difficult to control asthma on daily oral pred  - Start duonebs q6h wake  - Start Solumedrol 60mg IV q6h per Critical Care recs  - 9/27 decreased to solumedrol 60mg q12h  - 9/28 returned to home dose PO pred 10mg bd              S/P appendectomy    Pt recently hospitalized for appendicitis with peritonitis. She had open appendectomy 8/26 with wound cultures positive for MRSA, enterococcus, and E.col. She was discharged with PO vanc (stop Sept 13), clinda (1 week), and flagyl (stop Sept 13).   At SNF , she was on Ertapenem (started 9/20) and Vancomycin (started 9/21) until this transfered to  ED for present admission    A/P per gen surg:  - Unconvinced of SBP/ acute abdomen, or surgical site infection - no WCC no fever, no signs infection at incision site  - RLQ pelvic fluid collection on CT but has been shrinking on its own when compared to previous imaging, do not recommend draining at this time. Likely hematoma or seroma  - Continue Vanc and Zosyn per ID until resolution of pelvic fluid collection on follow up imaging        Clostridium difficile infection    Recent hospitalization positive for C. Diff infection. Treated with PO vanc and flagyl.   Pt presently reporting loose, but not watery, stool    - Repeat C. Diff panel negative          NIKHIL (acute kidney injury)    Cr 1.4 on admit. Baseline 0.8. Likely pre-renal 2/2 poor intake in setting of AMS  Trending 1.4 > 0.9 > 1.0 > 0.9 > 0.8 -> 1.3  - UA NAD  - +2.9L/24 hrs after admission  - Patient refusing PO intake, family expressing concern pt not drinking. Started on slow maintenance fluids  - Monitor BMP daily  - Pt w/ recurring NIKHIL whenever maintenance fluids stopped as refuses PO intake with AMS and frusemide for CHF and HTN. Have held frusemide for now and back on low dose maintenance.             Closed compression fracture of fourth lumbar vertebra    Pt with chronic back pain. S/p spinal stimulator placement    - Avoiding opiates for now because of AMS  - Baclofen PRN for pain  - Started gabapentin   - Holding baclofen and gabapentin in setting of AMS        CAD (coronary artery disease)    - ASA 81  - Plavix 75  - Simvastatin 40           Insulin dependent diabetes mellitus    - Continue insulin detemir 7u BID  - Continue LDSSI  - Diabetic diet  - Monitor for hypo/hyperglycemia  - Hyperglycemia to 451 overnight in the setting of increased IV steroid dosing for respiratory acidosis and suspected asthma exacerbation, given 27U lantus and 12U detemir overnight -->   - Currently being weaned off of IV methylpred 60mg bd back onto PO pred  10mg bd home dose yesterday  -  overnight 9/29, insulin gtt --> BGL 200s, transitioned to basal/prandial dosing will adjust as needed          VTE Risk Mitigation (From admission, onward)        Ordered     heparin, porcine (PF) 100 unit/mL injection flush 500 Units  As needed (PRN)      09/24/18 0145     heparin (porcine) injection 5,000 Units  Every 8 hours      09/23/18 2100              Wayne Morris MD  Department of Hospital Medicine   [facility]                    09/30/2018                             STAFF PHYSICIAN NOTE                                   Attending Attestation for Rounds with Resident  I have reviewed and concur with the resident's history, physical, assessment, and plan.  I have personally interviewed and examined the patient at bedside and agree with the resident's findings.                                  ________________________________________                                     REASON FOR ADMISSION:     Patient is 63 y.o.female    Body mass index is 32.11 kg/m².,  Altered mental status

## 2018-09-29 NOTE — PLAN OF CARE
Problem: Patient Care Overview  Goal: Plan of Care Review  Outcome: Ongoing (interventions implemented as appropriate)  Pt AAOX3. Pt c/o pain in LLQ. R/t Appendectomy in August. Tylenol on MAR for pain. Pt is on 2L NC and sats near 100%. BG continues to be elevated in the high 200-mide 300's. Supplemental insulin given. Long acting Insulin added to MAR. VSS. No acute issues. PT/OT ambulated pt in room zabrina bedside chair. Pt tolerated we.. Care plan reviewed. WCTM

## 2018-09-29 NOTE — PLAN OF CARE
Problem: Patient Care Overview  Goal: Plan of Care Review  Outcome: Ongoing (interventions implemented as appropriate)  Cont Insulin Inf stopped. Full liquid diet.

## 2018-09-29 NOTE — ASSESSMENT & PLAN NOTE
ESSENTIAL HYPERTENSION    Last ECHO 8/23/18 showing EF 60-65%, grade 1 diastolic dysfunction     - Lasix 40 mg daily  - Losartan 100 mg daily  - Dilt 180mg -> 240mg  - Prazosin d/c  - low dose hydralazine, titrate as needed

## 2018-09-29 NOTE — ASSESSMENT & PLAN NOTE
- Breo daily  - Theophylline 200 mg daily  -  Albuterol PRN  - Pt was on prednisone 10mg daily per daughter  - Relative contraindication for beta blockers in HTN/AF mgmt given significant difficult to control asthma on daily oral pred  - Start duonebs q6h wake  - Start Solumedrol 60mg IV q6h per Critical Care recs  - 9/27 decreased to solumedrol 60mg q12h  - 9/28 returned to home dose PO pred 10mg bd

## 2018-09-29 NOTE — PLAN OF CARE
Problem: Patient Care Overview  Goal: Plan of Care Review  Outcome: Ongoing (interventions implemented as appropriate)  Pt's VSS, afebrile. Pt's glucose remained extremely elevated throughout the shift. Bedtime scheduled dose of insulin and sliding scale insulin given per order. MD notified and aware of critical glucose value 636; started on insulin gtt at approx. 0400; QHR sugar checks maintained, stat lab orders and urinalysis obtained. Pt remained alert and oriented throughout night. New IV started; pt tolerated well; received Zosyn and Vanc per order. NPO status maintained; pt now on clear liquid diet. Loose stools x3 overnight. Safety maintained. Hourly rounding maintained. Free of falls. WCTM.

## 2018-09-30 LAB
ALBUMIN SERPL BCP-MCNC: 2.6 G/DL
ALP SERPL-CCNC: 63 U/L
ALT SERPL W/O P-5'-P-CCNC: 20 U/L
ANION GAP SERPL CALC-SCNC: 7 MMOL/L
ANION GAP SERPL CALC-SCNC: 8 MMOL/L
ANION GAP SERPL CALC-SCNC: 9 MMOL/L
AST SERPL-CCNC: 13 U/L
BASOPHILS # BLD AUTO: 0.01 K/UL
BASOPHILS NFR BLD: 0.1 %
BILIRUB SERPL-MCNC: 0.4 MG/DL
BUN SERPL-MCNC: 12 MG/DL
BUN SERPL-MCNC: 13 MG/DL
BUN SERPL-MCNC: 13 MG/DL
CALCIUM SERPL-MCNC: 8 MG/DL
CALCIUM SERPL-MCNC: 8.7 MG/DL
CALCIUM SERPL-MCNC: 8.8 MG/DL
CALCIUM SERPL-MCNC: 9 MG/DL
CHLORIDE SERPL-SCNC: 103 MMOL/L
CHLORIDE SERPL-SCNC: 104 MMOL/L
CHLORIDE SERPL-SCNC: 106 MMOL/L
CHLORIDE SERPL-SCNC: 106 MMOL/L
CO2 SERPL-SCNC: 29 MMOL/L
CO2 SERPL-SCNC: 29 MMOL/L
CO2 SERPL-SCNC: 31 MMOL/L
CREAT SERPL-MCNC: 0.9 MG/DL
DIFFERENTIAL METHOD: ABNORMAL
EOSINOPHIL # BLD AUTO: 0 K/UL
EOSINOPHIL NFR BLD: 0 %
ERYTHROCYTE [DISTWIDTH] IN BLOOD BY AUTOMATED COUNT: 18.6 %
EST. GFR  (AFRICAN AMERICAN): >60 ML/MIN/1.73 M^2
EST. GFR  (NON AFRICAN AMERICAN): >60 ML/MIN/1.73 M^2
GLUCOSE SERPL-MCNC: 148 MG/DL
GLUCOSE SERPL-MCNC: 193 MG/DL
GLUCOSE SERPL-MCNC: 195 MG/DL
GLUCOSE SERPL-MCNC: 210 MG/DL
HCT VFR BLD AUTO: 30.8 %
HGB BLD-MCNC: 9.5 G/DL
IMM GRANULOCYTES # BLD AUTO: 0.1 K/UL
IMM GRANULOCYTES NFR BLD AUTO: 1.2 %
LYMPHOCYTES # BLD AUTO: 1.1 K/UL
LYMPHOCYTES NFR BLD: 13.6 %
MAGNESIUM SERPL-MCNC: 1.2 MG/DL
MCH RBC QN AUTO: 26.8 PG
MCHC RBC AUTO-ENTMCNC: 30.8 G/DL
MCV RBC AUTO: 87 FL
MONOCYTES # BLD AUTO: 0.5 K/UL
MONOCYTES NFR BLD: 6.4 %
NEUTROPHILS # BLD AUTO: 6.5 K/UL
NEUTROPHILS NFR BLD: 78.7 %
NRBC BLD-RTO: 0 /100 WBC
PHOSPHATE SERPL-MCNC: 1.7 MG/DL
PLATELET # BLD AUTO: 193 K/UL
PMV BLD AUTO: 10.1 FL
POCT GLUCOSE: 131 MG/DL (ref 70–110)
POCT GLUCOSE: 142 MG/DL (ref 70–110)
POCT GLUCOSE: 151 MG/DL (ref 70–110)
POCT GLUCOSE: 180 MG/DL (ref 70–110)
POTASSIUM SERPL-SCNC: 3.6 MMOL/L
POTASSIUM SERPL-SCNC: 3.7 MMOL/L
POTASSIUM SERPL-SCNC: 3.7 MMOL/L
POTASSIUM SERPL-SCNC: 4.3 MMOL/L
PROT SERPL-MCNC: 5.5 G/DL
RBC # BLD AUTO: 3.54 M/UL
SODIUM SERPL-SCNC: 141 MMOL/L
SODIUM SERPL-SCNC: 142 MMOL/L
SODIUM SERPL-SCNC: 143 MMOL/L
SODIUM SERPL-SCNC: 144 MMOL/L
VANCOMYCIN TROUGH SERPL-MCNC: 16.8 UG/ML
WBC # BLD AUTO: 8.31 K/UL

## 2018-09-30 PROCEDURE — 25500020 PHARM REV CODE 255: Performed by: HOSPITALIST

## 2018-09-30 PROCEDURE — 25000242 PHARM REV CODE 250 ALT 637 W/ HCPCS: Performed by: HOSPITALIST

## 2018-09-30 PROCEDURE — 80048 BASIC METABOLIC PNL TOTAL CA: CPT | Mod: 91

## 2018-09-30 PROCEDURE — 94761 N-INVAS EAR/PLS OXIMETRY MLT: CPT

## 2018-09-30 PROCEDURE — 25000003 PHARM REV CODE 250: Performed by: STUDENT IN AN ORGANIZED HEALTH CARE EDUCATION/TRAINING PROGRAM

## 2018-09-30 PROCEDURE — 80048 BASIC METABOLIC PNL TOTAL CA: CPT

## 2018-09-30 PROCEDURE — 18500000 HC LEAVE OF ABSENCE HOSPITAL SERVICES

## 2018-09-30 PROCEDURE — 27000221 HC OXYGEN, UP TO 24 HOURS

## 2018-09-30 PROCEDURE — 63600175 PHARM REV CODE 636 W HCPCS: Performed by: STUDENT IN AN ORGANIZED HEALTH CARE EDUCATION/TRAINING PROGRAM

## 2018-09-30 PROCEDURE — 80053 COMPREHEN METABOLIC PANEL: CPT

## 2018-09-30 PROCEDURE — 94660 CPAP INITIATION&MGMT: CPT

## 2018-09-30 PROCEDURE — 80202 ASSAY OF VANCOMYCIN: CPT

## 2018-09-30 PROCEDURE — 20600001 HC STEP DOWN PRIVATE ROOM

## 2018-09-30 PROCEDURE — 85025 COMPLETE CBC W/AUTO DIFF WBC: CPT

## 2018-09-30 PROCEDURE — 99233 SBSQ HOSP IP/OBS HIGH 50: CPT | Mod: ,,, | Performed by: HOSPITALIST

## 2018-09-30 PROCEDURE — 99900035 HC TECH TIME PER 15 MIN (STAT)

## 2018-09-30 PROCEDURE — 36415 COLL VENOUS BLD VENIPUNCTURE: CPT

## 2018-09-30 PROCEDURE — 84100 ASSAY OF PHOSPHORUS: CPT

## 2018-09-30 PROCEDURE — 94640 AIRWAY INHALATION TREATMENT: CPT

## 2018-09-30 PROCEDURE — 83735 ASSAY OF MAGNESIUM: CPT

## 2018-09-30 RX ORDER — TRAMADOL HYDROCHLORIDE 50 MG/1
50 TABLET ORAL ONCE
Status: COMPLETED | OUTPATIENT
Start: 2018-09-30 | End: 2018-09-30

## 2018-09-30 RX ORDER — IPRATROPIUM BROMIDE AND ALBUTEROL SULFATE 2.5; .5 MG/3ML; MG/3ML
3 SOLUTION RESPIRATORY (INHALATION) EVERY 4 HOURS PRN
Status: DISCONTINUED | OUTPATIENT
Start: 2018-10-01 | End: 2018-10-02 | Stop reason: HOSPADM

## 2018-09-30 RX ADMIN — TRAMADOL HYDROCHLORIDE 50 MG: 50 TABLET, FILM COATED ORAL at 05:09

## 2018-09-30 RX ADMIN — CALCIUM CARBONATE (ANTACID) CHEW TAB 500 MG 1000 MG: 500 CHEW TAB at 09:09

## 2018-09-30 RX ADMIN — IPRATROPIUM BROMIDE AND ALBUTEROL SULFATE 3 ML: .5; 3 SOLUTION RESPIRATORY (INHALATION) at 08:09

## 2018-09-30 RX ADMIN — TRAMADOL HYDROCHLORIDE 50 MG: 50 TABLET, FILM COATED ORAL at 09:09

## 2018-09-30 RX ADMIN — IOHEXOL 75 ML: 350 INJECTION, SOLUTION INTRAVENOUS at 10:09

## 2018-09-30 RX ADMIN — HYDRALAZINE HYDROCHLORIDE 25 MG: 25 TABLET ORAL at 09:09

## 2018-09-30 RX ADMIN — FLUTICASONE FUROATE AND VILANTEROL TRIFENATATE 1 PUFF: 100; 25 POWDER RESPIRATORY (INHALATION) at 08:09

## 2018-09-30 RX ADMIN — IPRATROPIUM BROMIDE AND ALBUTEROL SULFATE 3 ML: .5; 3 SOLUTION RESPIRATORY (INHALATION) at 04:09

## 2018-09-30 RX ADMIN — PYRIDOXINE HCL TAB 50 MG 50 MG: 50 TAB at 08:09

## 2018-09-30 RX ADMIN — ACETAMINOPHEN 1000 MG: 500 TABLET ORAL at 07:09

## 2018-09-30 RX ADMIN — HYDRALAZINE HYDROCHLORIDE 25 MG: 25 TABLET ORAL at 06:09

## 2018-09-30 RX ADMIN — SIMVASTATIN 40 MG: 20 TABLET, FILM COATED ORAL at 09:09

## 2018-09-30 RX ADMIN — PIPERACILLIN SODIUM AND TAZOBACTAM SODIUM 4.5 G: 4; .5 INJECTION, POWDER, LYOPHILIZED, FOR SOLUTION INTRAVENOUS at 01:09

## 2018-09-30 RX ADMIN — ASPIRIN 81 MG: 81 TABLET, COATED ORAL at 08:09

## 2018-09-30 RX ADMIN — IPRATROPIUM BROMIDE AND ALBUTEROL SULFATE 3 ML: .5; 3 SOLUTION RESPIRATORY (INHALATION) at 12:09

## 2018-09-30 RX ADMIN — INSULIN ASPART 2 UNITS: 100 INJECTION, SOLUTION INTRAVENOUS; SUBCUTANEOUS at 01:09

## 2018-09-30 RX ADMIN — ACETAMINOPHEN 1000 MG: 500 TABLET ORAL at 12:09

## 2018-09-30 RX ADMIN — PREDNISONE 10 MG: 10 TABLET ORAL at 09:09

## 2018-09-30 RX ADMIN — CLOPIDOGREL 75 MG: 75 TABLET, FILM COATED ORAL at 08:09

## 2018-09-30 RX ADMIN — DILTIAZEM HYDROCHLORIDE 240 MG: 120 CAPSULE, COATED, EXTENDED RELEASE ORAL at 08:09

## 2018-09-30 RX ADMIN — PIPERACILLIN SODIUM AND TAZOBACTAM SODIUM 4.5 G: 4; .5 INJECTION, POWDER, LYOPHILIZED, FOR SOLUTION INTRAVENOUS at 05:09

## 2018-09-30 RX ADMIN — HEPARIN SODIUM 5000 UNITS: 5000 INJECTION, SOLUTION INTRAVENOUS; SUBCUTANEOUS at 09:09

## 2018-09-30 RX ADMIN — HEPARIN SODIUM 5000 UNITS: 5000 INJECTION, SOLUTION INTRAVENOUS; SUBCUTANEOUS at 03:09

## 2018-09-30 RX ADMIN — PIPERACILLIN SODIUM AND TAZOBACTAM SODIUM 4.5 G: 4; .5 INJECTION, POWDER, LYOPHILIZED, FOR SOLUTION INTRAVENOUS at 09:09

## 2018-09-30 RX ADMIN — PREDNISONE 10 MG: 10 TABLET ORAL at 08:09

## 2018-09-30 RX ADMIN — HEPARIN SODIUM 5000 UNITS: 5000 INJECTION, SOLUTION INTRAVENOUS; SUBCUTANEOUS at 06:09

## 2018-09-30 RX ADMIN — IPRATROPIUM BROMIDE AND ALBUTEROL SULFATE 3 ML: .5; 3 SOLUTION RESPIRATORY (INHALATION) at 07:09

## 2018-09-30 RX ADMIN — INSULIN DETEMIR 20 UNITS: 100 INJECTION, SOLUTION SUBCUTANEOUS at 08:09

## 2018-09-30 RX ADMIN — LOSARTAN POTASSIUM 100 MG: 50 TABLET, FILM COATED ORAL at 08:09

## 2018-09-30 RX ADMIN — IPRATROPIUM BROMIDE AND ALBUTEROL SULFATE 3 ML: .5; 3 SOLUTION RESPIRATORY (INHALATION) at 11:09

## 2018-09-30 RX ADMIN — HYDRALAZINE HYDROCHLORIDE 25 MG: 25 TABLET ORAL at 03:09

## 2018-09-30 RX ADMIN — CALCIUM CARBONATE (ANTACID) CHEW TAB 500 MG 1000 MG: 500 CHEW TAB at 08:09

## 2018-09-30 RX ADMIN — PANTOPRAZOLE SODIUM 40 MG: 40 TABLET, DELAYED RELEASE ORAL at 08:09

## 2018-09-30 RX ADMIN — VANCOMYCIN HYDROCHLORIDE 1250 MG: 10 INJECTION, POWDER, LYOPHILIZED, FOR SOLUTION INTRAVENOUS at 01:09

## 2018-09-30 NOTE — ASSESSMENT & PLAN NOTE
Pt recently hospitalized for appendicitis with peritonitis. She had open appendectomy 8/26 with wound cultures positive for MRSA, enterococcus, and E.col. She was discharged with PO vanc (stop Sept 13), clinda (1 week), and flagyl (stop Sept 13).   At SNF , she was on Ertapenem (started 9/20) and Vancomycin (started 9/21) until this transfered to ED for present admission    A/P per gen surg:  - Unconvinced of SBP/ acute abdomen, or surgical site infection - no WCC no fever, no signs infection at incision site  - RLQ pelvic fluid collection on CT but has been shrinking on its own when compared to previous imaging, do not recommend draining at this time. Likely hematoma or seroma  - Continue Vanc and Zosyn per ID until resolution of pelvic fluid collection on follow up imaging  - CT abdo pending, if collection shrinking can relay estimate to d/c Abs to SNF for 1-2wks

## 2018-09-30 NOTE — PROGRESS NOTES
[facility] Hospital Medicine  Progress Note    Patient Name: Sheryl Martines  MRN: [id number]  Patient Class: IP- Inpatient   Admission Date: 9/23/2018  Length of Stay: 5 days  Attending Physician: Keyla Martell MD  Primary Care Provider: Primary Doctor [facility] Medicine Team: [facility] [place] [facility]P MED 1 Wayne Morris MD    Subjective:     Principal Problem:Altered mental status    HPI:  Ms. Martines is a 63-year-old female with recent appendicitis s/p appendectomy, COPD, IDDM, CAD, pontine stroke (2012) presents from SNF for intermittent altered mental status (per SNF) and lethargy of 2 days. Patient denies AMS herself and is attributing drowsiness to pain medications.   She also c/o loose stool and dizziness. She denied fever, chills, headache, chest pain, SOB, nausea, vomiting, dysuria.    Patient currently living in rehab facility for reconditioning and treatment of wound infection s/p appendectomy on 8/26. On previous admission, she presented with slurred speech and AMS. EEG was done at the time and did not show any seizure activity. She found to have appendicitis with peritonitis, and underwent open appendectomy. Her abdominal wound cultures positive for  MRSA, enterococcus, & e. coli.  Also positive for c. diff during admission. She was discharged with PO vanc (stop Sept 13), clinda (1 week), and flagyl (stop Sept 13) per ID/ surgery.    At SNF, she was on Ertapenem (started 9/20) and Vancomycin (started 9/21) until this transfered to ED for present admission.      Hospital Course:  General surgery consulted and do not believe pt has current surgical complication or secondary infection. Open appy wound site is now healed and not infected at present, intrabdominal RLQ pelvic collection (believed to be hematoma or seroma) is shrinking on abdo CT comparison to previous imaging and not convincing for infection given no WCC or fever per gen surg. CT-H is negative for acute intracranial process. EEG  shows no epileptiform activity. Blood cultures are NGTD.  Made NPO temporarily per SLP recs due to concern for aspiration risk following CO2 narcosis 9/26 (pH 7.31 CO2 69) with decreased mentation. Pt started on bipap with resolution of acidosis and improvement of mental status. 9/28 mental status continued to improve and pt is now fully AAOx3 with good concentration, spontaneous eye opening, and ability to eat and drink. AMS at presentation likely due to CO2 retention. Pt is being continued on Zosyn and Vanc as per ID recs until pelvic collection has cleared as confirmed on followup imaging.     No new subjective & objective note has been filed under this hospital service since the last note was generated.    Assessment/Plan:      * Altered mental status    Pt presents from SNF with AMS and abdo pain (although pt denies AMS and abdominal pain during review of systems).  - CT head showed no acute path  - Daughter believes pt is altered also, poor speech articulation and concentration. Believes may be due to opioid medications  - Infectious workup negative thus far, opioids held but no tangible improvement in mental status    Plan:  - Started Vanc and Zosyn, continue until pelvic fluid collection has resolved on imaging follow up as per ID recs  - Low dose maintenance fluids as poor cooperation to PO intake  - Continue to hold opioids  - Delirium precautions  - Consider neurology consult  - Continue vancomycin and Zosyn until repeat imaging per ID recs  - Repeat CT head 9/26 without acute abnormality, CXR also NAD  - Likely CO2 narcosis, as pt with respiratory acidosis with pH 7.31 pCO2 of 68  - AMS markedly improved following bipap  - Will encourage nightly CPAP to prevent CO2 retention  - Plan d/c to SNF pending CT abdo results for antibiotic recs         Atrial fibrillation    Paroxysmal AF onset 9/24 AM  RVR today with HR to ~115 and -170s  - Increased dilt from 180 to 240mg today (9/25), rate control has  been much better <100 in following days  - Currently on Heparin 5000U q8  - Contraindication for BB due to severe asthma on long term PO pred and albuterol nebs        Essential hypertension    9/27 - Continues to have very labile Bps throughout admission. Hypertensive crisis late this morning with  and new onset headache, due to missed medication dosing yesterday because NPO due to aspiration risk. Pt given 25mg hydralazine in addition to regular medication dosing:  - losartan 100mg  - Prazosin 5mg  - lasix PO 40 od  - Dilt 180 --> 240mg od  - Started clonidine 0.3mg patch due to poor BP control even before missed dosing (-180 spikes throughout hospital stay)  - prazosin d/c + started low dose hydralazine today, tolerating well so far        Gastroesophageal reflux disease without esophagitis    - Pantoprazole 40 mg           Debilitated patient    - PT/OT          (HFpEF) heart failure with preserved ejection fraction    ESSENTIAL HYPERTENSION    Last ECHO 8/23/18 showing EF 60-65%, grade 1 diastolic dysfunction     - Lasix 40 mg daily  - Losartan 100 mg daily  - Dilt 180mg -> 240mg  - Prazosin d/c  - low dose hydralazine, titrate as needed            Moderate asthma without complication    - Breo daily  - Theophylline 200 mg daily  -  Albuterol PRN  - Pt was on prednisone 10mg daily per daughter  - Relative contraindication for beta blockers in HTN/AF mgmt given significant difficult to control asthma on daily oral pred  - Start duonebs q6h wake  - Start Solumedrol 60mg IV q6h per Critical Care recs  - 9/27 decreased to solumedrol 60mg q12h  - 9/28 returned to home dose PO pred 10mg bd  - 9/30 Pt now on rm air doing well              S/P appendectomy    Pt recently hospitalized for appendicitis with peritonitis. She had open appendectomy 8/26 with wound cultures positive for MRSA, enterococcus, and E.col. She was discharged with PO vanc (stop Sept 13), clinda (1 week), and flagyl (stop Sept 13).    At SNF , she was on Ertapenem (started 9/20) and Vancomycin (started 9/21) until this transfered to ED for present admission    A/P per gen surg:  - Unconvinced of SBP/ acute abdomen, or surgical site infection - no WCC no fever, no signs infection at incision site  - RLQ pelvic fluid collection on CT but has been shrinking on its own when compared to previous imaging, do not recommend draining at this time. Likely hematoma or seroma  - Continue Vanc and Zosyn per ID until resolution of pelvic fluid collection on follow up imaging  - CT abdo pending, if collection shrinking can relay estimate to d/c Abs to SNF for 1-2wks         Clostridium difficile infection    Recent hospitalization positive for C. Diff infection. Treated with PO vanc and flagyl.   Pt presently reporting loose, but not watery, stool    - Repeat C. Diff panel negative          NIKHIL (acute kidney injury)    Cr 1.4 on admit. Baseline 0.8. Likely pre-renal 2/2 poor intake in setting of AMS  Trending 1.4 > 0.9 > 1.0 > 0.9 > 0.8 -> 1.3 -> 0.9  - UA NAD  - +2.9L/24 hrs after admission  - Patient refusing PO intake, family expressing concern pt not drinking. Started on slow maintenance fluids  - Monitor BMP daily  - Pt w/ recurring NIKHIL whenever maintenance fluids stopped as refuses PO intake with AMS and frusemide for CHF and HTN. Have held frusemide for now and IVF for po challenge          Closed compression fracture of fourth lumbar vertebra    Pt with chronic back pain. S/p spinal stimulator placement    - Avoiding opiates for now because of AMS  - Baclofen PRN for pain  - Started gabapentin   - Holding baclofen and gabapentin in setting of AMS        CAD (coronary artery disease)    - ASA 81  - Plavix 75  - Simvastatin 40           Insulin dependent diabetes mellitus    - Continue insulin detemir 7u BID  - Continue LDSSI  - Diabetic diet  - Monitor for hypo/hyperglycemia  - Hyperglycemia to 451 overnight in the setting of increased IV steroid  dosing for respiratory acidosis and suspected asthma exacerbation, given 27U lantus and 12U detemir overnight -->   - Currently being weaned off of IV methylpred 60mg bd back onto PO pred 10mg bd home dose yesterday  -  overnight 9/29, insulin gtt --> BGL 200s, transitioned to basal/prandial dosing will adjust as needed          VTE Risk Mitigation (From admission, onward)        Ordered     heparin, porcine (PF) 100 unit/mL injection flush 500 Units  As needed (PRN)      09/24/18 0145     heparin (porcine) injection 5,000 Units  Every 8 hours      09/23/18 2100              Wayne Morris MD  Department of Hospital Medicine   [facility]                    10/01/2018                             STAFF PHYSICIAN NOTE                                   Attending Attestation for Rounds with Resident  I have reviewed and concur with the resident's history, physical, assessment, and plan.  I have personally interviewed and examined the patient at bedside and agree with the resident's findings.                                  ________________________________________                                     REASON FOR ADMISSION:     Patient is 63 y.o.female    Body mass index is 32.11 kg/m².,  Altered mental status

## 2018-09-30 NOTE — ASSESSMENT & PLAN NOTE
Cr 1.4 on admit. Baseline 0.8. Likely pre-renal 2/2 poor intake in setting of AMS  Trending 1.4 > 0.9 > 1.0 > 0.9 > 0.8 -> 1.3 -> 0.9  - UA NAD  - +2.9L/24 hrs after admission  - Patient refusing PO intake, family expressing concern pt not drinking. Started on slow maintenance fluids  - Monitor BMP daily  - Pt w/ recurring NIKHIL whenever maintenance fluids stopped as refuses PO intake with AMS and frusemide for CHF and HTN. Have held frusemide for now and IVF for po challenge

## 2018-09-30 NOTE — PLAN OF CARE
Problem: Patient Care Overview  Goal: Plan of Care Review  Outcome: Ongoing (interventions implemented as appropriate)  No change in status; Diet advanced to Reg Diabetic. BG continues running under 200. Placed on RA today and maintained Sat of % when awake.

## 2018-09-30 NOTE — ASSESSMENT & PLAN NOTE
9/27 - Continues to have very labile Bps throughout admission. Hypertensive crisis late this morning with  and new onset headache, due to missed medication dosing yesterday because NPO due to aspiration risk. Pt given 25mg hydralazine in addition to regular medication dosing:  - losartan 100mg  - Prazosin 5mg  - lasix PO 40 od  - Dilt 180 --> 240mg od  - Started clonidine 0.3mg patch due to poor BP control even before missed dosing (-180 spikes throughout hospital stay)  - prazosin d/c + started low dose hydralazine today, tolerating well so far

## 2018-09-30 NOTE — ASSESSMENT & PLAN NOTE
- Breo daily  - Theophylline 200 mg daily  -  Albuterol PRN  - Pt was on prednisone 10mg daily per daughter  - Relative contraindication for beta blockers in HTN/AF mgmt given significant difficult to control asthma on daily oral pred  - Start duonebs q6h wake  - Start Solumedrol 60mg IV q6h per Critical Care recs  - 9/27 decreased to solumedrol 60mg q12h  - 9/28 returned to home dose PO pred 10mg bd  - 9/30 Pt now on rm air doing well

## 2018-09-30 NOTE — PLAN OF CARE
Problem: Diabetes, Type 2 (Adult)  Goal: Signs and Symptoms of Listed Potential Problems Will be Absent, Minimized or Managed (Diabetes, Type 2)  Signs and symptoms of listed potential problems will be absent, minimized or managed by discharge/transition of care (reference Diabetes, Type 2 (Adult) CPG).  Outcome: Ongoing (interventions implemented as appropriate)  POC reviewed with pt, acknowledged understanding. Pt AAO x 4.Pt remains free of falls/injuries. Pt on telemetry remains SR. Pt blood glucose being monitored ACHS slides at 151. Pt tolerating Full liquiddiet. Pt pain moderately controlled with prescribed meds. Pt on 2 L NC.  No acute events throughout shift. No distress noted, will continue to monitor.

## 2018-09-30 NOTE — ASSESSMENT & PLAN NOTE
Pt presents from SNF with AMS and abdo pain (although pt denies AMS and abdominal pain during review of systems).  - CT head showed no acute path  - Daughter believes pt is altered also, poor speech articulation and concentration. Believes may be due to opioid medications  - Infectious workup negative thus far, opioids held but no tangible improvement in mental status    Plan:  - Started Vanc and Zosyn, continue until pelvic fluid collection has resolved on imaging follow up as per ID recs  - Low dose maintenance fluids as poor cooperation to PO intake  - Continue to hold opioids  - Delirium precautions  - Consider neurology consult  - Continue vancomycin and Zosyn until repeat imaging per ID recs  - Repeat CT head 9/26 without acute abnormality, CXR also NAD  - Likely CO2 narcosis, as pt with respiratory acidosis with pH 7.31 pCO2 of 68  - AMS markedly improved following bipap  - Will encourage nightly CPAP to prevent CO2 retention  - Plan d/c to SNF pending CT abdo results for antibiotic recs

## 2018-10-01 LAB
ALBUMIN SERPL BCP-MCNC: 2.5 G/DL
ALP SERPL-CCNC: 68 U/L
ALT SERPL W/O P-5'-P-CCNC: 24 U/L
ANION GAP SERPL CALC-SCNC: 6 MMOL/L
ANION GAP SERPL CALC-SCNC: 7 MMOL/L
AST SERPL-CCNC: 17 U/L
BACTERIA BLD CULT: NORMAL
BACTERIA BLD CULT: NORMAL
BASOPHILS # BLD AUTO: 0 K/UL
BASOPHILS NFR BLD: 0 %
BILIRUB SERPL-MCNC: 0.5 MG/DL
BUN SERPL-MCNC: 14 MG/DL
BUN SERPL-MCNC: 14 MG/DL
CALCIUM SERPL-MCNC: 8.6 MG/DL
CALCIUM SERPL-MCNC: 8.7 MG/DL
CHLORIDE SERPL-SCNC: 104 MMOL/L
CHLORIDE SERPL-SCNC: 105 MMOL/L
CO2 SERPL-SCNC: 29 MMOL/L
CO2 SERPL-SCNC: 30 MMOL/L
CREAT SERPL-MCNC: 0.8 MG/DL
CREAT SERPL-MCNC: 0.8 MG/DL
DIFFERENTIAL METHOD: ABNORMAL
EOSINOPHIL # BLD AUTO: 0 K/UL
EOSINOPHIL NFR BLD: 0.3 %
ERYTHROCYTE [DISTWIDTH] IN BLOOD BY AUTOMATED COUNT: 18.9 %
EST. GFR  (AFRICAN AMERICAN): >60 ML/MIN/1.73 M^2
EST. GFR  (AFRICAN AMERICAN): >60 ML/MIN/1.73 M^2
EST. GFR  (NON AFRICAN AMERICAN): >60 ML/MIN/1.73 M^2
EST. GFR  (NON AFRICAN AMERICAN): >60 ML/MIN/1.73 M^2
GLUCOSE SERPL-MCNC: 182 MG/DL
GLUCOSE SERPL-MCNC: 183 MG/DL
HCT VFR BLD AUTO: 31.3 %
HGB BLD-MCNC: 9.2 G/DL
IMM GRANULOCYTES # BLD AUTO: 0.08 K/UL
IMM GRANULOCYTES NFR BLD AUTO: 1.1 %
LYMPHOCYTES # BLD AUTO: 0.8 K/UL
LYMPHOCYTES NFR BLD: 11.3 %
MAGNESIUM SERPL-MCNC: 1.7 MG/DL
MCH RBC QN AUTO: 26.1 PG
MCHC RBC AUTO-ENTMCNC: 29.4 G/DL
MCV RBC AUTO: 89 FL
MONOCYTES # BLD AUTO: 0.4 K/UL
MONOCYTES NFR BLD: 5.4 %
NEUTROPHILS # BLD AUTO: 6 K/UL
NEUTROPHILS NFR BLD: 81.9 %
NRBC BLD-RTO: 0 /100 WBC
PHOSPHATE SERPL-MCNC: 2 MG/DL
PLATELET # BLD AUTO: 193 K/UL
PMV BLD AUTO: 10.9 FL
POCT GLUCOSE: 223 MG/DL (ref 70–110)
POCT GLUCOSE: 239 MG/DL (ref 70–110)
POCT GLUCOSE: 259 MG/DL (ref 70–110)
POCT GLUCOSE: 271 MG/DL (ref 70–110)
POCT GLUCOSE: >500 MG/DL (ref 70–110)
POCT GLUCOSE: >500 MG/DL (ref 70–110)
POTASSIUM SERPL-SCNC: 3.9 MMOL/L
POTASSIUM SERPL-SCNC: 4 MMOL/L
PROT SERPL-MCNC: 5.2 G/DL
RBC # BLD AUTO: 3.52 M/UL
SODIUM SERPL-SCNC: 140 MMOL/L
SODIUM SERPL-SCNC: 141 MMOL/L
WBC # BLD AUTO: 7.34 K/UL

## 2018-10-01 PROCEDURE — 25000003 PHARM REV CODE 250: Performed by: STUDENT IN AN ORGANIZED HEALTH CARE EDUCATION/TRAINING PROGRAM

## 2018-10-01 PROCEDURE — 84100 ASSAY OF PHOSPHORUS: CPT

## 2018-10-01 PROCEDURE — 63600175 PHARM REV CODE 636 W HCPCS: Performed by: STUDENT IN AN ORGANIZED HEALTH CARE EDUCATION/TRAINING PROGRAM

## 2018-10-01 PROCEDURE — 99233 SBSQ HOSP IP/OBS HIGH 50: CPT | Mod: ,,, | Performed by: HOSPITALIST

## 2018-10-01 PROCEDURE — 94761 N-INVAS EAR/PLS OXIMETRY MLT: CPT

## 2018-10-01 PROCEDURE — 99900035 HC TECH TIME PER 15 MIN (STAT)

## 2018-10-01 PROCEDURE — 97116 GAIT TRAINING THERAPY: CPT

## 2018-10-01 PROCEDURE — 18500000 HC LEAVE OF ABSENCE HOSPITAL SERVICES

## 2018-10-01 PROCEDURE — 83735 ASSAY OF MAGNESIUM: CPT

## 2018-10-01 PROCEDURE — 80048 BASIC METABOLIC PNL TOTAL CA: CPT

## 2018-10-01 PROCEDURE — 36415 COLL VENOUS BLD VENIPUNCTURE: CPT

## 2018-10-01 PROCEDURE — 20600001 HC STEP DOWN PRIVATE ROOM

## 2018-10-01 PROCEDURE — 80053 COMPREHEN METABOLIC PANEL: CPT

## 2018-10-01 PROCEDURE — 97110 THERAPEUTIC EXERCISES: CPT

## 2018-10-01 PROCEDURE — 85025 COMPLETE CBC W/AUTO DIFF WBC: CPT

## 2018-10-01 PROCEDURE — 97530 THERAPEUTIC ACTIVITIES: CPT

## 2018-10-01 PROCEDURE — 94660 CPAP INITIATION&MGMT: CPT

## 2018-10-01 PROCEDURE — 99223 1ST HOSP IP/OBS HIGH 75: CPT | Mod: ,,, | Performed by: INTERNAL MEDICINE

## 2018-10-01 RX ADMIN — ACETAMINOPHEN 1000 MG: 500 TABLET ORAL at 08:10

## 2018-10-01 RX ADMIN — LOSARTAN POTASSIUM 100 MG: 50 TABLET, FILM COATED ORAL at 10:10

## 2018-10-01 RX ADMIN — PIPERACILLIN SODIUM AND TAZOBACTAM SODIUM 4.5 G: 4; .5 INJECTION, POWDER, LYOPHILIZED, FOR SOLUTION INTRAVENOUS at 05:10

## 2018-10-01 RX ADMIN — CALCIUM CARBONATE (ANTACID) CHEW TAB 500 MG 1000 MG: 500 CHEW TAB at 10:10

## 2018-10-01 RX ADMIN — PREDNISONE 10 MG: 10 TABLET ORAL at 10:10

## 2018-10-01 RX ADMIN — PREDNISONE 10 MG: 10 TABLET ORAL at 08:10

## 2018-10-01 RX ADMIN — DILTIAZEM HYDROCHLORIDE 240 MG: 120 CAPSULE, COATED, EXTENDED RELEASE ORAL at 10:10

## 2018-10-01 RX ADMIN — SIMVASTATIN 40 MG: 20 TABLET, FILM COATED ORAL at 08:10

## 2018-10-01 RX ADMIN — HEPARIN SODIUM 5000 UNITS: 5000 INJECTION, SOLUTION INTRAVENOUS; SUBCUTANEOUS at 05:10

## 2018-10-01 RX ADMIN — PIPERACILLIN SODIUM AND TAZOBACTAM SODIUM 4.5 G: 4; .5 INJECTION, POWDER, LYOPHILIZED, FOR SOLUTION INTRAVENOUS at 12:10

## 2018-10-01 RX ADMIN — PANTOPRAZOLE SODIUM 40 MG: 40 TABLET, DELAYED RELEASE ORAL at 10:10

## 2018-10-01 RX ADMIN — ASPIRIN 81 MG: 81 TABLET, COATED ORAL at 10:10

## 2018-10-01 RX ADMIN — FLUTICASONE FUROATE AND VILANTEROL TRIFENATATE 1 PUFF: 100; 25 POWDER RESPIRATORY (INHALATION) at 10:10

## 2018-10-01 RX ADMIN — HEPARIN SODIUM 5000 UNITS: 5000 INJECTION, SOLUTION INTRAVENOUS; SUBCUTANEOUS at 08:10

## 2018-10-01 RX ADMIN — HYDRALAZINE HYDROCHLORIDE 25 MG: 25 TABLET ORAL at 04:10

## 2018-10-01 RX ADMIN — INSULIN ASPART 4 UNITS: 100 INJECTION, SOLUTION INTRAVENOUS; SUBCUTANEOUS at 04:10

## 2018-10-01 RX ADMIN — HYDRALAZINE HYDROCHLORIDE 25 MG: 25 TABLET ORAL at 08:10

## 2018-10-01 RX ADMIN — CLOPIDOGREL 75 MG: 75 TABLET, FILM COATED ORAL at 10:10

## 2018-10-01 RX ADMIN — ACETAMINOPHEN 1000 MG: 500 TABLET ORAL at 04:10

## 2018-10-01 RX ADMIN — INSULIN ASPART 4 UNITS: 100 INJECTION, SOLUTION INTRAVENOUS; SUBCUTANEOUS at 11:10

## 2018-10-01 RX ADMIN — INSULIN ASPART 3 UNITS: 100 INJECTION, SOLUTION INTRAVENOUS; SUBCUTANEOUS at 08:10

## 2018-10-01 RX ADMIN — PYRIDOXINE HCL TAB 50 MG 50 MG: 50 TAB at 10:10

## 2018-10-01 RX ADMIN — HEPARIN SODIUM 5000 UNITS: 5000 INJECTION, SOLUTION INTRAVENOUS; SUBCUTANEOUS at 04:10

## 2018-10-01 RX ADMIN — ACETAMINOPHEN 1000 MG: 500 TABLET ORAL at 12:10

## 2018-10-01 RX ADMIN — INSULIN ASPART 4 UNITS: 100 INJECTION, SOLUTION INTRAVENOUS; SUBCUTANEOUS at 07:10

## 2018-10-01 RX ADMIN — VANCOMYCIN HYDROCHLORIDE 1250 MG: 10 INJECTION, POWDER, LYOPHILIZED, FOR SOLUTION INTRAVENOUS at 02:10

## 2018-10-01 RX ADMIN — INSULIN DETEMIR 20 UNITS: 100 INJECTION, SOLUTION SUBCUTANEOUS at 10:10

## 2018-10-01 RX ADMIN — CALCIUM CARBONATE (ANTACID) CHEW TAB 500 MG 1000 MG: 500 CHEW TAB at 08:10

## 2018-10-01 NOTE — ASSESSMENT & PLAN NOTE
63F s/p recent appendectomy c/b peritonitis and RLQ fluid collection tx w/ vanc and ertapenem, wound at abd incision site    Recommendations  - discontinue antibiotics and monitor patient clinically

## 2018-10-01 NOTE — HPI
63F s/p appendectomy 8/26, complicated post-op by peritonitis tx w/ clinda and flagyl, and c. Diff tx w/ PO vanc. Presented to [facility] in mid-September, found to have abd fluid collection and wound at incision site. Wound cultures + MRSA, enterococcus and e. Coli. She was evaluated by ID, who recommended ertapenem and vanco for 2 weeks w/ repeat imaging for re-evaluation of fluid collection. Pt now admitted to [facility] w/ AMS 2/2 hypercapnia, improved after bipap. CT A/P done, w/ unchanged fluid collection. ID consulted for eval.    Pt denies abd pain, n/v. Abd wound is healing well w/o drainage.

## 2018-10-01 NOTE — PLAN OF CARE
Problem: Patient Care Overview  Goal: Plan of Care Review  Outcome: Ongoing (interventions implemented as appropriate)  Ct of abdomen with contrast completed lastnight.  C/o chronic back pain notified Md administered tramadol x1 with full relief.  Fitted by respiratory for a new face mask and wore her CPAPmachine with setting of  10 on 21% o2 from 10pm - 4am stated that she woke up with a headache.  SPO2 % on room air.  BMx3 loose brown.  Voiding in bedpan.  Turning in bed q2hrs no skin breakdown.  RLQ appy incision site cleaned with sterile saline and island boarder dressing applied no drainage incision  open and yellow inside.    IV antibiotics administered as ordered.  All vital signs stable safety maintained.  Tolerating 2000 ADA diet.  CBG at bedtime was 130.

## 2018-10-01 NOTE — CONSULTS
Consult received. Full note to follow. Please call for questions.    Thanks,    Taylor Sauer DO  Infectious Disease Fellow  P: [phone]

## 2018-10-01 NOTE — ASSESSMENT & PLAN NOTE
- Continue insulin detemir 7u BID  - Continue LDSSI  - Diabetic diet  - Monitor for hypo/hyperglycemia  - Hyperglycemia to 451 overnight in the setting of increased IV steroid dosing for respiratory acidosis and suspected asthma exacerbation, given 27U lantus and 12U detemir overnight -->   - Currently being weaned off of IV methylpred 60mg bd back onto PO pred 10mg bd home dose yesterday  -  overnight 9/29, insulin gtt --> BGL 200s, transitioned to basal/prandial dosing will adjust as needed    -Last A1c reviewed-   Lab Results   Component Value Date    HGBA1C 6.4 (H) 09/26/2018     Inpatient Antihyperglycemic Regiment:   Antihyperglycemics (From admission, onward)    Start     Stop Route Frequency Ordered    09/29/18 1346  insulin aspart U-100 pen 1-10 Units      -- SubQ Before meals & nightly PRN 09/29/18 1248    09/29/18 1300  insulin detemir U-100 pen 20 Units      -- SubQ Daily 09/29/18 1249    09/29/18 0400  insulin regular (Humulin R) 100 Units in sodium chloride 0.9% 100 mL infusion     Question:  Insulin Rate Adjustment (DO NOT MODIFY ANSWER)  Answer:  [email or web address]    09/29 1447 IV Continuous 09/29/18 0256        -LSSI  -ACCU Checks ACHS  -Diabetic Diet 2000 aubrey  -Diabetic nutritional counseling given     Blood Sugars (AccuCheck):  Recent Labs      09/30/18   0812  09/30/18   1158  09/30/18   1642  09/30/18   2145  10/01/18   0750  10/01/18   1133   POCTGLUCOSE  142*  180*  151*  131*  239*  223*

## 2018-10-01 NOTE — PT/OT/SLP PROGRESS
Physical Therapy Treatment    Patient Name:  Sheryl Martines   MRN:  [id number]    Recommendations:     Discharge Recommendations:  nursing facility, skilled   Discharge Equipment Recommendations: (TBD)   Barriers to discharge: Decreased caregiver support    Assessment:     Sheryl Martines is a 63 y.o. female admitted with a medical diagnosis of Altered mental status.  She presents with the following impairments/functional limitations:  weakness, impaired endurance, impaired self care skills, gait instability, impaired functional mobilty, impaired balance, decreased lower extremity function, decreased coordination .  Pt tolerated treatment  well and is progressing slowly with mobility. Pt would continue to benefit from skilled PT to address overall functional mobility and goals. Goals remain appropriate    Rehab Prognosis:  good; patient would benefit from acute skilled PT services to address these deficits and reach maximum level of function.      Recent Surgery: * No surgery found *      Plan:     During this hospitalization, patient to be seen 4 x/week to address the above listed problems via gait training, therapeutic activities, therapeutic exercises  · Plan of Care Expires:  10/24/18   Plan of Care Reviewed with: patient    Subjective     Communicated with RN prior to session.  Patient found supine upon PT entry to room, agreeable to treatment.      Chief Complaint: fatigue  Patient comments/goals: I want to walk with my walker(rollator)  Pain/Comfort:  · Pain Rating 1: 0/10  · Location - Side 1: Bilateral  · Location - Orientation 1: lower  · Location 1: back  · Pain Addressed 1: Reposition  · Pain Rating Post-Intervention 1: (did not rate)    Patients cultural, spiritual, [employer]ian conflicts given the current situation: none noted    Objective:     Patient found with: telemetry, pulse ox (continuous), peripheral IV     General Precautions: Standard, fall   Orthopedic Precautions:N/A   Braces:        Functional Mobility:  · Bed Mobility:     · Rolling Left:  stand by assistance  · Scooting: contact guard assistance  · Supine to Sit: contact guard assistance  · Transfers:     · Sit to Stand:  minimum assistance with rolling walker and 4 wheeled walker  · SPT bed to chair with RW with CGa/Kwasi  · Gait: pt ambulated with rollator x 12 ft  with CGA with vcs for upright posture.      AM-PAC 6 CLICK MOBILITY  Turning over in bed (including adjusting bedclothes, sheets and blankets)?: 3  Sitting down on and standing up from a chair with arms (e.g., wheelchair, bedside commode, etc.): 3  Moving from lying on back to sitting on the side of the bed?: 3  Moving to and from a bed to a chair (including a wheelchair)?: 3  Need to walk in hospital room?: 3  Climbing 3-5 steps with a railing?: 1  Basic Mobility Total Score: 16       Therapeutic Activities and Exercises:   Discussed/educated patient on progress, safety, importance of OOB mobility for improving outcomes, PT POC   Patient performed therex X 10 reps seated in bedside chair B LE AROM AP, LAQ, Hip Flexion, Hip Abd/Add   White board updated in patients room to current assistance level   Donned an extra gown and gripping socks   Bedside table in front of patient and area set up for function, convenience, and safety. RN aware of patient's mobility needs and status. Questions/concerns addressed within PTA scope of practice; patient with no further questions      Patient left up in chair with all lines intact, call button in reach and nsg notified..    GOALS:   Multidisciplinary Problems     Physical Therapy Goals        Problem: Physical Therapy Goal    Goal Priority Disciplines Outcome Goal Variances Interventions   Physical Therapy Goal     PT, PT/OT Ongoing (interventions implemented as appropriate)     Description:  Goals to be met by: 10/1     Patient will increase functional independence with mobility by performin. Supine to sit with Contact Guard  Assistance  2. Sit to supine with Contact Guard Assistance  3. Sit to stand transfer with Minimal Assistance with RW  4. Bed to chair transfer with Minimal Assistance using Rolling Walker  5. Gait  x 25 feet with Minimal Assistance using Rolling Walker.                       Time Tracking:     PT Received On: 10/01/18  PT Start Time: 0945     PT Stop Time: 1024  PT Total Time (min): 39 min     Billable Minutes: Gait Training 13, Therapeutic Activity 13 and Therapeutic Exercise 13    Treatment Type: Treatment  PT/PTA: PTA     PTA Visit Number: 2     Edwardo Wu PTA  10/01/2018

## 2018-10-01 NOTE — SUBJECTIVE & OBJECTIVE
Interval History: AMS markedly improved today, spontaneous eye opening, fully coherent and AAOx3, baseline mentation. Likely previous AMS due to CO2 retention, as improvement followed session on bipap.  -awaiting ID rec's on ABx coverage as pelvic fluid collection has not improved and is questionably a hematoma vs abscess      Review of Systems   Constitutional: Negative for chills, diaphoresis and fever.   Eyes: Negative for photophobia and visual disturbance.   Respiratory: Negative for cough, shortness of breath and wheezing.    Cardiovascular: Negative for palpitations and leg swelling.   Gastrointestinal: Positive for abdominal pain. Negative for constipation, diarrhea, nausea and vomiting.   Genitourinary: Negative for dysuria and flank pain.   Musculoskeletal: Negative for arthralgias.   Skin: Positive for wound (open appy scar LLQ healing well). Negative for rash.   Neurological: Positive for weakness. Negative for syncope and headaches.   Psychiatric/Behavioral: Negative for agitation, confusion and decreased concentration. The patient is not nervous/anxious.      Objective:     Vital Signs (Most Recent):  Temp: 99 °F (37.2 °C) (10/01/18 1221)  Pulse: 100 (10/01/18 1221)  Resp: 18 (10/01/18 1221)  BP: (!) 177/87 (10/01/18 1221)  SpO2: 100 % (10/01/18 1221) Vital Signs (24h Range):  Temp:  [97.9 °F (36.6 °C)-99.1 °F (37.3 °C)] 99 °F (37.2 °C)  Pulse:  [] 100  Resp:  [12-22] 18  SpO2:  [86 %-100 %] 100 %  BP: (159-177)/(74-87) 177/87     Weight: 74.6 kg (164 lb 6.4 oz)  Body mass index is 32.11 kg/m².    Intake/Output Summary (Last 24 hours) at 10/1/2018 1455  Last data filed at 9/30/2018 2133  Gross per 24 hour   Intake 450 ml   Output 400 ml   Net 50 ml      Physical Exam      Significant Labs:   CBC:   Recent Labs   Lab  09/30/18   0402  10/01/18   0500   WBC  8.31  7.34   HGB  9.5*  9.2*   HCT  30.8*  31.3*   PLT  193  193     CMP:   Recent Labs   Lab  09/30/18   0402  09/30/18   0806  09/30/18    1159  10/01/18   0500   NA  141  141  141  144  143  140  141   K  4.3  4.3  4.3  3.7  3.6  3.9  4.0   CL  103  103  103  106  106  104  105   CO2  31*  31*  31*  29  29  29  30*   GLU  210*  210*  210*  148*  195*  183*  182*   BUN  12  12  12  12  13  14  14   CREATININE  0.9  0.9  0.9  0.9  0.9  0.8  0.8   CALCIUM  8.0*  8.0*  8.0*  8.8  9.0  8.7  8.6*   PROT  5.5*   --    --   5.2*   ALBUMIN  2.6*   --    --   2.5*   BILITOT  0.4   --    --   0.5   ALKPHOS  63   --    --   68   AST  13   --    --   17   ALT  20   --    --   24   ANIONGAP  7*  7*  7*  9  8  7*  6*   EGFRNONAA  >60.0  >60.0  >60.0  >60.0  >60.0  >60.0  >60.0     Recent Lab Results       10/01/18  1133 10/01/18  0750 10/01/18  0500 09/30/18  2145 09/30/18  1642      Immature Granulocytes   1.1       Immature Grans (Abs)   0.08  Comment:  Mild elevation in immature granulocytes is non specific and   can be seen in a variety of conditions including stress response,   acute inflammation, trauma and pregnancy. Correlation with other   laboratory and clinical findings is essential.         Albumin   2.5       Alkaline Phosphatase   68       ALT   24       Anion Gap   7          6       AST   17       Baso #   0.00       Basophil%   0.0       Total Bilirubin   0.5  Comment:  For infants and newborns, interpretation of results should be based  on gestational age, weight and in agreement with clinical  observations.  Premature Infant recommended reference ranges:  Up to 24 hours.............<8.0 mg/dL  Up to 48 hours............<12.0 mg/dL  3-5 days..................<15.0 mg/dL  6-29 days.................<15.0 mg/dL         BUN, Bld   14          14       Calcium   8.7          8.6       Chloride   104          105       CO2   29          30       Creatinine   0.8          0.8       Differential Method   Automated       eGFR if    >60.0          >60.0       eGFR if non African American    >60.0  Comment:  Calculation used to obtain the estimated glomerular filtration  rate (eGFR) is the CKD-EPI equation.             >60.0  Comment:  Calculation used to obtain the estimated glomerular filtration  rate (eGFR) is the CKD-EPI equation.          Eos #   0.0       Eosinophil%   0.3       Glucose   183          182       Gran # (ANC)   6.0       Gran%   81.9       Hematocrit   31.3       Hemoglobin   9.2       Lymph #   0.8       Lymph%   11.3       Magnesium   1.7       MCH   26.1       MCHC   29.4       MCV   89       Mono #   0.4       Mono%   5.4       MPV   10.9       nRBC   0       Phosphorus   2.0       Platelets   193       POCT Glucose 223 239  131 151     Potassium   3.9          4.0       Total Protein   5.2       RBC   3.52       RDW   18.9       Sodium   140          141       WBC   7.34                       Significant Imaging: I have reviewed all pertinent imaging results/findings within the past 24 hours.

## 2018-10-01 NOTE — PROGRESS NOTES
[facility] Hospital Medicine  Progress Note    Patient Name: Sheryl Martines  MRN: [id number]  Patient Class: IP- Inpatient   Admission Date: 9/23/2018  Length of Stay: 6 days  Attending Physician: Keyla Martell MD  Primary Care Provider: Primary [facility] Medicine Team: [facility] MED 1 Mara Pizano MD    Subjective:     Principal Problem:Altered mental status    HPI:  Ms. Martines is a 63-year-old female with recent appendicitis s/p appendectomy, COPD, IDDM, CAD, pontine stroke (2012) presents from SNF for intermittent altered mental status (per SNF) and lethargy of 2 days. Patient denies AMS herself and is attributing drowsiness to pain medications.   She also c/o loose stool and dizziness. She denied fever, chills, headache, chest pain, SOB, nausea, vomiting, dysuria.    Patient currently living in rehab facility for reconditioning and treatment of wound infection s/p appendectomy on 8/26. On previous admission, she presented with slurred speech and AMS. EEG was done at the time and did not show any seizure activity. She found to have appendicitis with peritonitis, and underwent open appendectomy. Her abdominal wound cultures positive for  MRSA, enterococcus, & e. coli.  Also positive for c. diff during admission. She was discharged with PO vanc (stop Sept 13), clinda (1 week), and flagyl (stop Sept 13) per ID/ surgery.    At SNF, she was on Ertapenem (started 9/20) and Vancomycin (started 9/21) until this transfered to ED for present admission.      Hospital Course:  General surgery consulted and do not believe pt has current surgical complication or secondary infection. Open appy wound site is now healed and not infected at present, intrabdominal RLQ pelvic collection (believed to be hematoma or seroma) is shrinking on abdo CT comparison to previous imaging and not convincing for infection given no WCC or fever per gen surg. CT-H is negative for acute intracranial  process. EEG shows no epileptiform activity. Blood cultures are NGTD.  Made NPO temporarily per SLP recs due to concern for aspiration risk following CO2 narcosis 9/26 (pH 7.31 CO2 69) with decreased mentation. Pt started on bipap with resolution of acidosis and improvement of mental status. 9/28 mental status continued to improve and pt is now fully AAOx3 with good concentration, spontaneous eye opening, and ability to eat and drink. AMS at presentation likely due to CO2 retention. Pt is being continued on Zosyn and Vanc as per ID recs until pelvic collection has cleared as confirmed on followup imaging.     Interval History: AMS markedly improved today, spontaneous eye opening, fully coherent and AAOx3, baseline mentation. Likely previous AMS due to CO2 retention, as improvement followed session on bipap.  -awaiting ID rec's on ABx coverage as pelvic fluid collection has not improved and is questionably a hematoma vs abscess      Review of Systems   Constitutional: Negative for chills, diaphoresis and fever.   Eyes: Negative for photophobia and visual disturbance.   Respiratory: Negative for cough, shortness of breath and wheezing.    Cardiovascular: Negative for palpitations and leg swelling.   Gastrointestinal: Positive for abdominal pain. Negative for constipation, diarrhea, nausea and vomiting.   Genitourinary: Negative for dysuria and flank pain.   Musculoskeletal: Negative for arthralgias.   Skin: Positive for wound (open appy scar LLQ healing well). Negative for rash.   Neurological: Positive for weakness. Negative for syncope and headaches.   Psychiatric/Behavioral: Negative for agitation, confusion and decreased concentration. The patient is not nervous/anxious.      Objective:     Vital Signs (Most Recent):  Temp: 99 °F (37.2 °C) (10/01/18 1221)  Pulse: 100 (10/01/18 1221)  Resp: 18 (10/01/18 1221)  BP: (!) 177/87 (10/01/18 1221)  SpO2: 100 % (10/01/18 1221) Vital Signs (24h Range):  Temp:  [97.9 °F  (36.6 °C)-99.1 °F (37.3 °C)] 99 °F (37.2 °C)  Pulse:  [] 100  Resp:  [12-22] 18  SpO2:  [86 %-100 %] 100 %  BP: (159-177)/(74-87) 177/87     Weight: 74.6 kg (164 lb 6.4 oz)  Body mass index is 32.11 kg/m².    Intake/Output Summary (Last 24 hours) at 10/1/2018 1455  Last data filed at 9/30/2018 2133  Gross per 24 hour   Intake 450 ml   Output 400 ml   Net 50 ml      Physical Exam      Significant Labs:   CBC:   Recent Labs   Lab  09/30/18   0402  10/01/18   0500   WBC  8.31  7.34   HGB  9.5*  9.2*   HCT  30.8*  31.3*   PLT  193  193     CMP:   Recent Labs   Lab  09/30/18   0402  09/30/18   0806  09/30/18   1159  10/01/18   0500   NA  141  141  141  144  143  140  141   K  4.3  4.3  4.3  3.7  3.6  3.9  4.0   CL  103  103  103  106  106  104  105   CO2  31*  31*  31*  29  29  29  30*   GLU  210*  210*  210*  148*  195*  183*  182*   BUN  12  12  12  12  13  14  14   CREATININE  0.9  0.9  0.9  0.9  0.9  0.8  0.8   CALCIUM  8.0*  8.0*  8.0*  8.8  9.0  8.7  8.6*   PROT  5.5*   --    --   5.2*   ALBUMIN  2.6*   --    --   2.5*   BILITOT  0.4   --    --   0.5   ALKPHOS  63   --    --   68   AST  13   --    --   17   ALT  20   --    --   24   ANIONGAP  7*  7*  7*  9  8  7*  6*   EGFRNONAA  >60.0  >60.0  >60.0  >60.0  >60.0  >60.0  >60.0     Recent Lab Results       10/01/18  1133 10/01/18  0750 10/01/18  0500 09/30/18  2145 09/30/18  1642      Immature Granulocytes   1.1       Immature Grans (Abs)   0.08  Comment:  Mild elevation in immature granulocytes is non specific and   can be seen in a variety of conditions including stress response,   acute inflammation, trauma and pregnancy. Correlation with other   laboratory and clinical findings is essential.         Albumin   2.5       Alkaline Phosphatase   68       ALT   24       Anion Gap   7          6       AST   17       Baso #   0.00       Basophil%   0.0       Total Bilirubin   0.5  Comment:  For infants and newborns, interpretation of  results should be based  on gestational age, weight and in agreement with clinical  observations.  Premature Infant recommended reference ranges:  Up to 24 hours.............<8.0 mg/dL  Up to 48 hours............<12.0 mg/dL  3-5 days..................<15.0 mg/dL  6-29 days.................<15.0 mg/dL         BUN, Bld   14          14       Calcium   8.7          8.6       Chloride   104          105       CO2   29          30       Creatinine   0.8          0.8       Differential Method   Automated       eGFR if    >60.0          >60.0       eGFR if non    >60.0  Comment:  Calculation used to obtain the estimated glomerular filtration  rate (eGFR) is the CKD-EPI equation.             >60.0  Comment:  Calculation used to obtain the estimated glomerular filtration  rate (eGFR) is the CKD-EPI equation.          Eos #   0.0       Eosinophil%   0.3       Glucose   183          182       Gran # (ANC)   6.0       Gran%   81.9       Hematocrit   31.3       Hemoglobin   9.2       Lymph #   0.8       Lymph%   11.3       Magnesium   1.7       MCH   26.1       MCHC   29.4       MCV   89       Mono #   0.4       Mono%   5.4       MPV   10.9       nRBC   0       Phosphorus   2.0       Platelets   193       POCT Glucose 223 239  131 151     Potassium   3.9          4.0       Total Protein   5.2       RBC   3.52       RDW   18.9       Sodium   140          141       WBC   7.34                       Significant Imaging: I have reviewed all pertinent imaging results/findings within the past 24 hours.    Assessment/Plan:      * Altered mental status    Pt presents from SNF with AMS and abdo pain (although pt denies AMS and abdominal pain during review of systems).  - CT head showed no acute path  - Daughter believes pt is altered also, poor speech articulation and concentration. Believes may be due to opioid medications  - Infectious workup negative thus far, opioids held but no tangible improvement in  mental status    Plan:  - Started Vanc and Zosyn, continue until pelvic fluid collection has resolved on imaging follow up as per ID recs  - Low dose maintenance fluids as poor cooperation to PO intake  - Continue to hold opioids  - Delirium precautions  - Consider neurology consult  - Continue vancomycin and Zosyn until repeat imaging per ID recs  - Repeat CT head 9/26 without acute abnormality, CXR also NAD  - Likely CO2 narcosis, as pt with respiratory acidosis with pH 7.31 pCO2 of 68  - AMS markedly improved following bipap  - Will encourage nightly CPAP to prevent CO2 retention  - Plan d/c to SNF pending CT abdo results for antibiotic recs         Atrial fibrillation    Paroxysmal AF onset 9/24 AM  RVR today with HR to ~115 and -170s  - Increased dilt from 180 to 240mg today (9/25), rate control has been much better <100 in following days  - Currently on Heparin 5000U q8  - Contraindication for BB due to severe asthma on long term PO pred and albuterol nebs        Essential hypertension    9/27 - Continues to have very labile Bps throughout admission. Hypertensive crisis late this morning with  and new onset headache, due to missed medication dosing yesterday because NPO due to aspiration risk. Pt given 25mg hydralazine in addition to regular medication dosing:  - losartan 100mg  - Prazosin 5mg  - lasix PO 40 od  - Dilt 180 --> 240mg od  - Started clonidine 0.3mg patch due to poor BP control even before missed dosing (-180 spikes throughout hospital stay)  - prazosin d/c + started low dose hydralazine today, tolerating well so far        Gastroesophageal reflux disease without esophagitis    - Pantoprazole 40 mg           Debilitated patient    - PT/OT          (HFpEF) heart failure with preserved ejection fraction    ESSENTIAL HYPERTENSION    Last ECHO 8/23/18 showing EF 60-65%, grade 1 diastolic dysfunction     - Lasix 40 mg daily  - Losartan 100 mg daily  - Dilt 180mg -> 240mg  -  Prazosin d/c  - low dose hydralazine, titrate as needed            Moderate asthma without complication    - Breo daily  - Theophylline 200 mg daily  -  Albuterol PRN  - Pt was on prednisone 10mg daily per daughter  - Relative contraindication for beta blockers in HTN/AF mgmt given significant difficult to control asthma on daily oral pred  - Start duonebs q6h wake  - Start Solumedrol 60mg IV q6h per Critical Care recs  - 9/27 decreased to solumedrol 60mg q12h  - 9/28 returned to home dose PO pred 10mg bd  - 9/30 Pt now on rm air doing well              S/P appendectomy    Pt recently hospitalized for appendicitis with peritonitis. She had open appendectomy 8/26 with wound cultures positive for MRSA, enterococcus, and E.col. She was discharged with PO vanc (stop Sept 13), clinda (1 week), and flagyl (stop Sept 13).   At SNF , she was on Ertapenem (started 9/20) and Vancomycin (started 9/21) until this transfered to ED for present admission    A/P per gen surg:  - Unconvinced of SBP/ acute abdomen, or surgical site infection - no WCC no fever, no signs infection at incision site  - RLQ pelvic fluid collection on CT but has been shrinking on its own when compared to previous imaging, do not recommend draining at this time. Likely hematoma or seroma  - Continue Vanc and Zosyn per ID until resolution of pelvic fluid collection on follow up imaging  - CT abdo pending, if collection shrinking can relay estimate to d/c Abs to SNF for 1-2wks         Clostridium difficile infection    Recent hospitalization positive for C. Diff infection. Treated with PO vanc and flagyl.   Pt presently reporting loose, but not watery, stool    - Repeat C. Diff panel negative          NIKHIL (acute kidney injury)    Cr 1.4 on admit. Baseline 0.8. Likely pre-renal 2/2 poor intake in setting of AMS  Trending 1.4 > 0.9 > 1.0 > 0.9 > 0.8 -> 1.3 -> 0.9  - UA NAD  - +2.9L/24 hrs after admission  - Patient refusing PO intake, family expressing concern  pt not drinking. Started on slow maintenance fluids  - Monitor BMP daily  - Pt w/ recurring NIKHIL whenever maintenance fluids stopped as refuses PO intake with AMS and frusemide for CHF and HTN. Have held frusemide for now and IVF for po challenge          Closed compression fracture of fourth lumbar vertebra    Pt with chronic back pain. S/p spinal stimulator placement    - Avoiding opiates for now because of AMS  - Baclofen PRN for pain  - Started gabapentin   - Holding baclofen and gabapentin in setting of AMS        CAD (coronary artery disease)    - ASA 81  - Plavix 75  - Simvastatin 40           Insulin dependent diabetes mellitus    - Continue insulin detemir 7u BID  - Continue LDSSI  - Diabetic diet  - Monitor for hypo/hyperglycemia  - Hyperglycemia to 451 overnight in the setting of increased IV steroid dosing for respiratory acidosis and suspected asthma exacerbation, given 27U lantus and 12U detemir overnight -->   - Currently being weaned off of IV methylpred 60mg bd back onto PO pred 10mg bd home dose yesterday  -  overnight 9/29, insulin gtt --> BGL 200s, transitioned to basal/prandial dosing will adjust as needed    -Last A1c reviewed-   Lab Results   Component Value Date    HGBA1C 6.4 (H) 09/26/2018     Inpatient Antihyperglycemic Regiment:   Antihyperglycemics (From admission, onward)    Start     Stop Route Frequency Ordered    09/29/18 1346  insulin aspart U-100 pen 1-10 Units      -- SubQ Before meals & nightly PRN 09/29/18 1248    09/29/18 1300  insulin detemir U-100 pen 20 Units      -- SubQ Daily 09/29/18 1249    09/29/18 0400  insulin regular (Humulin R) 100 Units in sodium chloride 0.9% 100 mL infusion     Question:  Insulin Rate Adjustment (DO NOT MODIFY ANSWER)  Answer:  [email or web address]    09/29 1447 IV Continuous 09/29/18 0256        -LSSI  -ACCU Checks ACHS  -Diabetic Diet 2000 aubrey  -Diabetic nutritional counseling  given     Blood Sugars (AccuCheck):  Recent Labs      09/30/18   0812  09/30/18   1158  09/30/18   1642  09/30/18   2145  10/01/18   0750  10/01/18   1133   POCTGLUCOSE  142*  180*  151*  131*  239*  223*                     VTE Risk Mitigation (From admission, onward)        Ordered     heparin, porcine (PF) 100 unit/mL injection flush 500 Units  As needed (PRN)      09/24/18 0145     heparin (porcine) injection 5,000 Units  Every 8 hours      09/23/18 2100            Plan discussed with attending Dr. Keyla Martell MD , further recommendations as per attending addendum. Please feel free to call with any questions or concerns.          Mara Pizano MD  Department of Hospital Medicine   [facility]                    10/01/2018                             STAFF PHYSICIAN NOTE                                   Attending Attestation for Rounds with Resident  I have reviewed and concur with the resident's history, physical, assessment, and plan.  I have personally interviewed and examined the patient at bedside and agree with the resident's findings.                                  ________________________________________                                     REASON FOR ADMISSION:     Patient is 63 y.o.female    Body mass index is 32.11 kg/m².,  Altered mental status

## 2018-10-01 NOTE — PLAN OF CARE
Problem: Physical Therapy Goal  Goal: Physical Therapy Goal  Goals to be met by: 10/1     Patient will increase functional independence with mobility by performin. Supine to sit with Contact Guard Assistance  2. Sit to supine with Contact Guard Assistance  3. Sit to stand transfer with Minimal Assistance with RW  4. Bed to chair transfer with Minimal Assistance using Rolling Walker  5. Gait  x 25 feet with Minimal Assistance using Rolling Walker.      Pt progressing towards goals. continue with PT POC.Goals remain appropriate.  Edwardo Wu PTA  10/1/2018

## 2018-10-01 NOTE — CONSULTS
[facility]  Infectious Disease  Consult Note    Patient Name: Sheryl Martines  MRN: [id number]  Admission Date: 9/23/2018  Hospital Length of Stay: 6 days  Attending Physician: Keyla Martell MD  Primary Care Provider: Primary Doctor No     Isolation Status: No active isolations    Patient information was obtained from patient and ER records.      Consults  Assessment/Plan:     Abdominal infection    63F s/p recent appendectomy c/b peritonitis and RLQ fluid collection tx w/ vanc and ertapenem, wound at abd incision site    Recommendations  - discontinue antibiotics and monitor patient clinically            Thank you for your consult. I will sign off. Please contact us if you have any additional questions.    Hannah Sauer,   Infectious Disease  [facility]    Subjective:     Principal Problem: Altered mental status    HPI: 63F s/p appendectomy 8/26, complicated post-op by peritonitis tx w/ clinda and flagyl, and c. Diff tx w/ PO vanc. Presented to [facility] in mid-September, found to have abd fluid collection and wound at incision site. Wound cultures + MRSA, enterococcus and e. Coli. She was evaluated by ID, who recommended ertapenem and vanco for 2 weeks w/ repeat imaging for re-evaluation of fluid collection. Pt now admitted to [facility] w/ AMS 2/2 hypercapnia, improved after bipap. CT A/P done, w/ unchanged fluid collection. ID consulted for eval.    Pt denies abd pain, n/v. Abd wound is healing well w/o drainage.    Past Medical History:   Diagnosis Date    Asthma     Closed compression fracture of fourth lumbar vertebra     COPD (chronic obstructive pulmonary disease)     Coronary artery disease     Diabetes mellitus     Glaucoma     High cholesterol     Hypertension     Iritis     Pulmonary embolus     Stroke     rt sided weakness.       Past Surgical History:   Procedure Laterality Date    ABDOMINAL SURGERY      APPENDECTOMY N/A 8/26/2018    Procedure:  APPENDECTOMY;  Surgeon: Bernadine Melendrez MD;  Location: [facility] OR;  Service: General;  Laterality: N/A;    APPENDECTOMY N/A 8/26/2018    Performed by Bernadine Melendrez MD at [facility] OR    APPENDECTOMY, LAPAROSCOPIC---CONVERTED TO OPEN APPENDECTOMY @0950 N/A 8/26/2018    Performed by Bernadine Melendrez MD at [facility] OR    BACK SURGERY      stimulator    CATARACT EXTRACTION      HYSTERECTOMY      LAPAROSCOPIC APPENDECTOMY N/A 8/26/2018    Procedure: APPENDECTOMY, LAPAROSCOPIC---CONVERTED TO OPEN APPENDECTOMY @0950;  Surgeon: Bernadine Melendrez MD;  Location: [facility] OR;  Service: General;  Laterality: N/A;       Review of patient's allergies indicates:   Allergen Reactions    Ace inhibitors Swelling    Corticosteroids (glucocorticoids)     Hydralazine analogues     Tetracyclines Swelling    Travatan (with benzalkonium) [travoprost (benzalkonium)]        Medications:  Medications Prior to Admission   Medication Sig    albuterol-ipratropium (DUO-NEB) 2.5 mg-0.5 mg/3 mL nebulizer solution Take 3 mLs by nebulization every 4 (four) hours. Rescue    aspirin (ECOTRIN) 81 MG EC tablet Take 1 tablet (81 mg total) by mouth once daily.    clopidogrel (PLAVIX) 75 mg tablet Take 75 mg by mouth once daily.    diltiaZEM (CARDIZEM CD) 180 MG 24 hr capsule Take 180 mg by mouth once daily.    DULoxetine (CYMBALTA) 60 MG capsule Take 60 mg by mouth once daily.    enoxaparin (LOVENOX) 40 mg/0.4 mL Syrg Inject 40 mg into the skin once daily.    furosemide (LASIX) 40 MG tablet Take 40 mg by mouth once daily.     insulin aspart U-100 (NOVOLOG) 100 unit/mL injection Inject 0-5 Units into the skin 4 (four) times daily as needed for High Blood Sugar.    insulin detemir U-100 (LEVEMIR) 100 unit/mL injection Inject 10 Units into the skin 2 (two) times daily.     Lactobacillus acidophilus 1 billion cell Cap Take 1 tablet by mouth 2 (two) times daily.    losartan (COZAAR) 100 MG tablet Take 100 mg by mouth once daily.      megestrol (MEGACE) 40 MG Tab Take 40 mg by mouth 2 (two) times daily.    ondansetron (ZOFRAN) 4 MG tablet Take 1 tablet (4 mg total) by mouth every 6 (six) hours as needed.    pantoprazole (PROTONIX) 40 MG tablet Take 40 mg by mouth once daily.    prazosin (MINIPRESS) 5 MG capsule Take 5 mg by mouth 2 (two) times daily.     predniSONE (DELTASONE) 10 MG tablet Take 10 mg by mouth 2 (two) times daily.     senna-docusate 8.6-50 mg (SENNA WITH DOCUSATE SODIUM) 8.6-50 mg per tablet Take 1 tablet by mouth 2 (two) times daily.    simethicone 80 mg Tab Take 160 mg by mouth every 6 (six) hours as needed for Flatulence.    simvastatin (ZOCOR) 40 MG tablet Take 40 mg by mouth every evening.    theophylline (THEODUR) 200 MG 12 hr tablet Take 200 mg by mouth once daily.      Antibiotics (From admission, onward)    Start     Stop Route Frequency Ordered    09/27/18 0100  vancomycin (VANCOCIN) 1,250 mg in dextrose 5 % 250 mL IVPB      -- IV Every 24 hours (non-standard times) 09/27/18 0003    09/23/18 1953  piperacillin-tazobactam 4.5 g in sodium chloride 0.9% 100 mL IVPB (ready to mix system)      -- IV Every 8 hours (non-standard times) 09/23/18 1953        Antifungals (From admission, onward)    None        Antivirals (From admission, onward)    None           Immunization History   Administered Date(s) Administered    PPD Test 08/28/2018, 09/14/2018       Family History     Problem Relation (Age of Onset)    Cancer Father    Diabetes Brother    Lupus Sister    Multiple sclerosis Mother        Social History     Socioeconomic History    Marital status:      Spouse name: None    Number of children: None    Years of education: None    Highest education level: None   Social Needs    Financial resource strain: None    Food insecurity - worry: None    Food insecurity - inability: None    Transportation needs - medical: None    Transportation needs - non-medical: None   Occupational History    None    Tobacco Use    Smoking status: Never Smoker   Substance and Sexual Activity    Alcohol use: No     Frequency: Never    Drug use: No    Sexual activity: No     Partners: Male   Other Topics Concern    None   Social History Narrative    None     Review of Systems   Constitutional: Negative for activity change, appetite change, chills, fever and unexpected weight change.   HENT: Negative for dental problem, ear discharge, ear pain, mouth sores, sinus pain, sore throat and trouble swallowing.    Eyes: Negative for pain and discharge.   Respiratory: Negative for cough, chest tightness, shortness of breath and wheezing.    Cardiovascular: Negative for chest pain and leg swelling.   Gastrointestinal: Negative for abdominal distention, abdominal pain, constipation, diarrhea, nausea and vomiting.   Genitourinary: Negative for difficulty urinating, dysuria, flank pain, frequency, genital sores and hematuria.   Musculoskeletal: Negative for arthralgias, joint swelling, neck pain and neck stiffness.   Skin: Negative for color change, rash and wound.   Neurological: Negative for dizziness, weakness, light-headedness, numbness and headaches.   Psychiatric/Behavioral: Negative for confusion. The patient is not nervous/anxious.      Objective:     Vital Signs (Most Recent):  Temp: 98.6 °F (37 °C) (10/01/18 1552)  Pulse: 92 (10/01/18 1552)  Resp: 18 (10/01/18 1552)  BP: (!) 189/84 (10/01/18 1552)  SpO2: 99 % (10/01/18 1552) Vital Signs (24h Range):  Temp:  [97.9 °F (36.6 °C)-99.1 °F (37.3 °C)] 98.6 °F (37 °C)  Pulse:  [] 92  Resp:  [12-22] 18  SpO2:  [86 %-100 %] 99 %  BP: (159-189)/(74-87) 189/84     Weight: 74.6 kg (164 lb 6.4 oz)  Body mass index is 32.11 kg/m².    Estimated Creatinine Clearance: 64.9 mL/min (based on SCr of 0.8 mg/dL).    Physical Exam   Constitutional: She is oriented to person, place, and time. She appears well-developed and well-nourished. No distress.   HENT:   Right Ear: External ear normal.    Left Ear: External ear normal.   Nose: Nose normal.   Mouth/Throat: Oropharynx is clear and moist.   Eyes: Conjunctivae and EOM are normal.   Neck: Normal range of motion. Neck supple.   Cardiovascular: Normal rate, regular rhythm, normal heart sounds and intact distal pulses.   No murmur heard.  Pulmonary/Chest: Effort normal and breath sounds normal. No respiratory distress. She has no wheezes.   Abdominal: Soft. Bowel sounds are normal. She exhibits no distension. There is no tenderness.   surgical incision RLQ well healed, no surrounding cellulitis or abscess   Musculoskeletal: Normal range of motion. She exhibits no edema.   Neurological: She is alert and oriented to person, place, and time. No cranial nerve deficit. Coordination normal.   Skin: Skin is warm and dry. No rash noted. She is not diaphoretic. No erythema.   Psychiatric: She has a normal mood and affect. Her behavior is normal.   Vitals reviewed.      Significant Labs:   CBC:   Recent Labs   Lab  09/30/18   0402  10/01/18   0500   WBC  8.31  7.34   HGB  9.5*  9.2*   HCT  30.8*  31.3*   PLT  193  193     CMP:   Recent Labs   Lab  09/30/18   0402  09/30/18   0806  09/30/18   1159  10/01/18   0500   NA  141  141  141  144  143  140  141   K  4.3  4.3  4.3  3.7  3.6  3.9  4.0   CL  103  103  103  106  106  104  105   CO2  31*  31*  31*  29  29  29  30*   GLU  210*  210*  210*  148*  195*  183*  182*   BUN  12  12  12  12  13  14  14   CREATININE  0.9  0.9  0.9  0.9  0.9  0.8  0.8   CALCIUM  8.0*  8.0*  8.0*  8.8  9.0  8.7  8.6*   PROT  5.5*   --    --   5.2*   ALBUMIN  2.6*   --    --   2.5*   BILITOT  0.4   --    --   0.5   ALKPHOS  63   --    --   68   AST  13   --    --   17   ALT  20   --    --   24   ANIONGAP  7*  7*  7*  9  8  7*  6*   EGFRNONAA  >60.0  >60.0  >60.0  >60.0  >60.0  >60.0  >60.0     Microbiology Results (last 7 days)     Procedure Component Value Units Date/Time    Blood Culture #1 **CANNOT BE ORDERED  STAT** [[id number]] Collected:  09/26/18 0714    Order Status:  Completed Specimen:  Blood Updated:  10/01/18 1012     Blood Culture, Routine No growth after 5 days.    Blood Culture #1 **CANNOT BE ORDERED STAT** [[id number]] Collected:  09/26/18 0715    Order Status:  Completed Specimen:  Blood Updated:  10/01/18 1012     Blood Culture, Routine No growth after 5 days.    Blood culture x two cultures. Draw prior to antibiotics. [[id number]] Collected:  09/23/18 1407    Order Status:  Completed Specimen:  Blood from Peripheral, Antecubital, Right Updated:  09/28/18 1612     Blood Culture, Routine No growth after 5 days.    Narrative:       Aerobic and anaerobic    Blood culture x two cultures. Draw prior to antibiotics. [[id number]] Collected:  09/23/18 1250    Order Status:  Completed Specimen:  Blood from Peripheral, Antecubital, Left Updated:  09/28/18 1412     Blood Culture, Routine No growth after 5 days.    Narrative:       Aerobic and anaerobic    Clostridium difficile EIA [[id number]] Collected:  09/27/18 1403    Order Status:  Completed Specimen:  Stool Updated:  09/27/18 2132     C. diff Antigen Negative     C difficile Toxins A+B, EIA Negative     Comment: Testing not recommended for children <24 months old.       Clostridium difficile EIA [[id number]]     Order Status:  Canceled Specimen:  Stool           Significant Imaging: I have reviewed all pertinent imaging results/findings within the past 24 hours.

## 2018-10-01 NOTE — PLAN OF CARE
Notified by [facility] that patient is declining SNF, patient may discharge home tomorrow with , will follow.       10/01/18 1525   Discharge Reassessment   Assessment Type Discharge Planning Reassessment   Provided patient/caregiver education on the expected discharge date and the discharge plan Yes   Do you have any problems affording any of your prescribed medications? No   Discharge Plan A Home;Home Health   Discharge Plan B Home   Can the patient answer the patient profile reliably? Yes, cognitively intact

## 2018-10-01 NOTE — SUBJECTIVE & OBJECTIVE
Past Medical History:   Diagnosis Date    Asthma     Closed compression fracture of fourth lumbar vertebra     COPD (chronic obstructive pulmonary disease)     Coronary artery disease     Diabetes mellitus     Glaucoma     High cholesterol     Hypertension     Iritis     Pulmonary embolus     Stroke     rt sided weakness.       Past Surgical History:   Procedure Laterality Date    ABDOMINAL SURGERY      APPENDECTOMY N/A 8/26/2018    Procedure: APPENDECTOMY;  Surgeon: Bernadine Melendrez MD;  Location: [facility] OR;  Service: General;  Laterality: N/A;    APPENDECTOMY N/A 8/26/2018    Performed by Bernadine Melendrez MD at [facility] OR    APPENDECTOMY, LAPAROSCOPIC---CONVERTED TO OPEN APPENDECTOMY @0950 N/A 8/26/2018    Performed by Bern[facility]ine Melendrez MD at [facility] OR    BACK SURGERY      stimulator    CATARACT EXTRACTION      HYSTERECTOMY      LAPAROSCOPIC APPENDECTOMY N/A 8/26/2018    Procedure: APPENDECTOMY, LAPAROSCOPIC---CONVERTED TO OPEN APPENDECTOMY @0950;  Surgeon: Bernadine Melendrez MD;  Location: [facility] OR;  Service: General;  Laterality: N/A;       Review of patient's allergies indicates:   Allergen Reactions    Ace inhibitors Swelling    Corticosteroids (glucocorticoids)     Hydralazine analogues     Tetracyclines Swelling    Travatan (with benzalkonium) [travoprost (benzalkonium)]        Medications:  Medications Prior to Admission   Medication Sig    albuterol-ipratropium (DUO-NEB) 2.5 mg-0.5 mg/3 mL nebulizer solution Take 3 mLs by nebulization every 4 (four) hours. Rescue    aspirin (ECOTRIN) 81 MG EC tablet Take 1 tablet (81 mg total) by mouth once daily.    clopidogrel (PLAVIX) 75 mg tablet Take 75 mg by mouth once daily.    diltiaZEM (CARDIZEM CD) 180 MG 24 hr capsule Take 180 mg by mouth once daily.    DULoxetine (CYMBALTA) 60 MG capsule Take 60 mg by mouth once daily.    enoxaparin (LOVENOX) 40 mg/0.4 mL Syrg Inject 40 mg into the skin once daily.    furosemide (LASIX) 40 MG  tablet Take 40 mg by mouth once daily.     insulin aspart U-100 (NOVOLOG) 100 unit/mL injection Inject 0-5 Units into the skin 4 (four) times daily as needed for High Blood Sugar.    insulin detemir U-100 (LEVEMIR) 100 unit/mL injection Inject 10 Units into the skin 2 (two) times daily.     Lactobacillus acidophilus 1 billion cell Cap Take 1 tablet by mouth 2 (two) times daily.    losartan (COZAAR) 100 MG tablet Take 100 mg by mouth once daily.     megestrol (MEGACE) 40 MG Tab Take 40 mg by mouth 2 (two) times daily.    ondansetron (ZOFRAN) 4 MG tablet Take 1 tablet (4 mg total) by mouth every 6 (six) hours as needed.    pantoprazole (PROTONIX) 40 MG tablet Take 40 mg by mouth once daily.    prazosin (MINIPRESS) 5 MG capsule Take 5 mg by mouth 2 (two) times daily.     predniSONE (DELTASONE) 10 MG tablet Take 10 mg by mouth 2 (two) times daily.     senna-docusate 8.6-50 mg (SENNA WITH DOCUSATE SODIUM) 8.6-50 mg per tablet Take 1 tablet by mouth 2 (two) times daily.    simethicone 80 mg Tab Take 160 mg by mouth every 6 (six) hours as needed for Flatulence.    simvastatin (ZOCOR) 40 MG tablet Take 40 mg by mouth every evening.    theophylline (THEODUR) 200 MG 12 hr tablet Take 200 mg by mouth once daily.      Antibiotics (From admission, onward)    Start     Stop Route Frequency Ordered    09/27/18 0100  vancomycin (VANCOCIN) 1,250 mg in dextrose 5 % 250 mL IVPB      -- IV Every 24 hours (non-standard times) 09/27/18 0003    09/23/18 1953  piperacillin-tazobactam 4.5 g in sodium chloride 0.9% 100 mL IVPB (ready to mix system)      -- IV Every 8 hours (non-standard times) 09/23/18 1953        Antifungals (From admission, onward)    None        Antivirals (From admission, onward)    None           Immunization History   Administered Date(s) Administered    PPD Test 08/28/2018, 09/14/2018       Family History     Problem Relation (Age of Onset)    Cancer Father    Diabetes Brother    Lupus Sister    Multiple  sclerosis Mother        Social History     Socioeconomic History    Marital status:      Spouse name: None    Number of children: None    Years of education: None    Highest education level: None   Social Needs    Financial resource strain: None    Food insecurity - worry: None    Food insecurity - inability: None    Transportation needs - medical: None    Transportation needs - non-medical: None   Occupational History    None   Tobacco Use    Smoking status: Never Smoker   Substance and Sexual Activity    Alcohol use: No     Frequency: Never    Drug use: No    Sexual activity: No     Partners: Male   Other Topics Concern    None   Social History Narrative    None     Review of Systems   Constitutional: Negative for activity change, appetite change, chills, fever and unexpected weight change.   HENT: Negative for dental problem, ear discharge, ear pain, mouth sores, sinus pain, sore throat and trouble swallowing.    Eyes: Negative for pain and discharge.   Respiratory: Negative for cough, chest tightness, shortness of breath and wheezing.    Cardiovascular: Negative for chest pain and leg swelling.   Gastrointestinal: Negative for abdominal distention, abdominal pain, constipation, diarrhea, nausea and vomiting.   Genitourinary: Negative for difficulty urinating, dysuria, flank pain, frequency, genital sores and hematuria.   Musculoskeletal: Negative for arthralgias, joint swelling, neck pain and neck stiffness.   Skin: Negative for color change, rash and wound.   Neurological: Negative for dizziness, weakness, light-headedness, numbness and headaches.   Psychiatric/Behavioral: Negative for confusion. The patient is not nervous/anxious.      Objective:     Vital Signs (Most Recent):  Temp: 98.6 °F (37 °C) (10/01/18 1552)  Pulse: 92 (10/01/18 1552)  Resp: 18 (10/01/18 1552)  BP: (!) 189/84 (10/01/18 1552)  SpO2: 99 % (10/01/18 1552) Vital Signs (24h Range):  Temp:  [97.9 °F (36.6 °C)-99.1 °F  (37.3 °C)] 98.6 °F (37 °C)  Pulse:  [] 92  Resp:  [12-22] 18  SpO2:  [86 %-100 %] 99 %  BP: (159-189)/(74-87) 189/84     Weight: 74.6 kg (164 lb 6.4 oz)  Body mass index is 32.11 kg/m².    Estimated Creatinine Clearance: 64.9 mL/min (based on SCr of 0.8 mg/dL).    Physical Exam   Constitutional: She is oriented to person, place, and time. She appears well-developed and well-nourished. No distress.   HENT:   Right Ear: External ear normal.   Left Ear: External ear normal.   Nose: Nose normal.   Mouth/Throat: Oropharynx is clear and moist.   Eyes: Conjunctivae and EOM are normal.   Neck: Normal range of motion. Neck supple.   Cardiovascular: Normal rate, regular rhythm, normal heart sounds and intact distal pulses.   No murmur heard.  Pulmonary/Chest: Effort normal and breath sounds normal. No respiratory distress. She has no wheezes.   Abdominal: Soft. Bowel sounds are normal. She exhibits no distension. There is no tenderness.   surgical incision RLQ well healed, no surrounding cellulitis or abscess   Musculoskeletal: Normal range of motion. She exhibits no edema.   Neurological: She is alert and oriented to person, place, and time. No cranial nerve deficit. Coordination normal.   Skin: Skin is warm and dry. No rash noted. She is not diaphoretic. No erythema.   Psychiatric: She has a normal mood and affect. Her behavior is normal.   Vitals reviewed.      Significant Labs:   CBC:   Recent Labs   Lab  09/30/18   0402  10/01/18   0500   WBC  8.31  7.34   HGB  9.5*  9.2*   HCT  30.8*  31.3*   PLT  193  193     CMP:   Recent Labs   Lab  09/30/18   0402  09/30/18   0806  09/30/18   1159  10/01/18   0500   NA  141  141  141  144  143  140  141   K  4.3  4.3  4.3  3.7  3.6  3.9  4.0   CL  103  103  103  106  106  104  105   CO2  31*  31*  31*  29  29  29  30*   GLU  210*  210*  210*  148*  195*  183*  182*   BUN  12  12  12  12  13  14  14   CREATININE  0.9  0.9  0.9  0.9  0.9  0.8  0.8   CALCIUM   8.0*  8.0*  8.0*  8.8  9.0  8.7  8.6*   PROT  5.5*   --    --   5.2*   ALBUMIN  2.6*   --    --   2.5*   BILITOT  0.4   --    --   0.5   ALKPHOS  63   --    --   68   AST  13   --    --   17   ALT  20   --    --   24   ANIONGAP  7*  7*  7*  9  8  7*  6*   EGFRNONAA  >60.0  >60.0  >60.0  >60.0  >60.0  >60.0  >60.0     Microbiology Results (last 7 days)     Procedure Component Value Units Date/Time    Blood Culture #1 **CANNOT BE ORDERED STAT** [[id number]] Collected:  09/26/18 0714    Order Status:  Completed Specimen:  Blood Updated:  10/01/18 1012     Blood Culture, Routine No growth after 5 days.    Blood Culture #1 **CANNOT BE ORDERED STAT** [[id number]] Collected:  09/26/18 0715    Order Status:  Completed Specimen:  Blood Updated:  10/01/18 1012     Blood Culture, Routine No growth after 5 days.    Blood culture x two cultures. Draw prior to antibiotics. [[id number]] Collected:  09/23/18 1407    Order Status:  Completed Specimen:  Blood from Peripheral, Antecubital, Right Updated:  09/28/18 1612     Blood Culture, Routine No growth after 5 days.    Narrative:       Aerobic and anaerobic    Blood culture x two cultures. Draw prior to antibiotics. [[id number]] Collected:  09/23/18 1250    Order Status:  Completed Specimen:  Blood from Peripheral, Antecubital, Left Updated:  09/28/18 1412     Blood Culture, Routine No growth after 5 days.    Narrative:       Aerobic and anaerobic    Clostridium difficile EIA [[id number]] Collected:  09/27/18 1403    Order Status:  Completed Specimen:  Stool Updated:  09/27/18 2132     C. diff Antigen Negative     C difficile Toxins A+B, EIA Negative     Comment: Testing not recommended for children <24 months old.       Clostridium difficile EIA [[id number]]     Order Status:  Canceled Specimen:  Stool           Significant Imaging: I have reviewed all pertinent imaging results/findings within the past 24 hours.

## 2018-10-02 ENCOUNTER — TELEPHONE (OUTPATIENT)
Dept: FAMILY MEDICINE | Facility: CLINIC | Age: 63
End: 2018-10-02

## 2018-10-02 VITALS
OXYGEN SATURATION: 97 % | SYSTOLIC BLOOD PRESSURE: 154 MMHG | RESPIRATION RATE: 16 BRPM | BODY MASS INDEX: 32.27 KG/M2 | TEMPERATURE: 99 F | HEART RATE: 88 BPM | WEIGHT: 164.38 LBS | DIASTOLIC BLOOD PRESSURE: 80 MMHG | HEIGHT: 60 IN

## 2018-10-02 LAB
ANION GAP SERPL CALC-SCNC: 4 MMOL/L
BASOPHILS # BLD AUTO: 0.01 K/UL
BASOPHILS NFR BLD: 0.1 %
BUN SERPL-MCNC: 13 MG/DL
CALCIUM SERPL-MCNC: 8.9 MG/DL
CHLORIDE SERPL-SCNC: 106 MMOL/L
CO2 SERPL-SCNC: 30 MMOL/L
CREAT SERPL-MCNC: 0.9 MG/DL
DIFFERENTIAL METHOD: ABNORMAL
EOSINOPHIL # BLD AUTO: 0 K/UL
EOSINOPHIL NFR BLD: 0.3 %
ERYTHROCYTE [DISTWIDTH] IN BLOOD BY AUTOMATED COUNT: 19 %
EST. GFR  (AFRICAN AMERICAN): >60 ML/MIN/1.73 M^2
EST. GFR  (NON AFRICAN AMERICAN): >60 ML/MIN/1.73 M^2
GLUCOSE SERPL-MCNC: 279 MG/DL
HCT VFR BLD AUTO: 31.6 %
HGB BLD-MCNC: 9.4 G/DL
IMM GRANULOCYTES # BLD AUTO: 0.06 K/UL
IMM GRANULOCYTES NFR BLD AUTO: 0.8 %
LYMPHOCYTES # BLD AUTO: 1.3 K/UL
LYMPHOCYTES NFR BLD: 16.5 %
MAGNESIUM SERPL-MCNC: 1.8 MG/DL
MCH RBC QN AUTO: 25.9 PG
MCHC RBC AUTO-ENTMCNC: 29.7 G/DL
MCV RBC AUTO: 87 FL
MONOCYTES # BLD AUTO: 0.4 K/UL
MONOCYTES NFR BLD: 4.9 %
NEUTROPHILS # BLD AUTO: 6.1 K/UL
NEUTROPHILS NFR BLD: 77.4 %
NRBC BLD-RTO: 0 /100 WBC
PHOSPHATE SERPL-MCNC: 1.9 MG/DL
PLATELET # BLD AUTO: 175 K/UL
PMV BLD AUTO: 10.1 FL
POCT GLUCOSE: 178 MG/DL (ref 70–110)
POCT GLUCOSE: 226 MG/DL (ref 70–110)
POCT GLUCOSE: 323 MG/DL (ref 70–110)
POTASSIUM SERPL-SCNC: 3.9 MMOL/L
RBC # BLD AUTO: 3.63 M/UL
SODIUM SERPL-SCNC: 140 MMOL/L
WBC # BLD AUTO: 7.93 K/UL

## 2018-10-02 PROCEDURE — 25000003 PHARM REV CODE 250: Performed by: STUDENT IN AN ORGANIZED HEALTH CARE EDUCATION/TRAINING PROGRAM

## 2018-10-02 PROCEDURE — 85025 COMPLETE CBC W/AUTO DIFF WBC: CPT

## 2018-10-02 PROCEDURE — 84466 ASSAY OF TRANSFERRIN: CPT

## 2018-10-02 PROCEDURE — 97530 THERAPEUTIC ACTIVITIES: CPT

## 2018-10-02 PROCEDURE — 80048 BASIC METABOLIC PNL TOTAL CA: CPT

## 2018-10-02 PROCEDURE — 99239 HOSP IP/OBS DSCHRG MGMT >30: CPT | Mod: GC,,, | Performed by: HOSPITALIST

## 2018-10-02 PROCEDURE — 97110 THERAPEUTIC EXERCISES: CPT

## 2018-10-02 PROCEDURE — 83036 HEMOGLOBIN GLYCOSYLATED A1C: CPT

## 2018-10-02 PROCEDURE — 83735 ASSAY OF MAGNESIUM: CPT

## 2018-10-02 PROCEDURE — 97535 SELF CARE MNGMENT TRAINING: CPT

## 2018-10-02 PROCEDURE — 63600175 PHARM REV CODE 636 W HCPCS: Performed by: STUDENT IN AN ORGANIZED HEALTH CARE EDUCATION/TRAINING PROGRAM

## 2018-10-02 PROCEDURE — 84100 ASSAY OF PHOSPHORUS: CPT

## 2018-10-02 RX ORDER — INSULIN LISPRO 100 [IU]/ML
3 INJECTION, SOLUTION INTRAVENOUS; SUBCUTANEOUS
Qty: 15 ML | Refills: 3 | Status: ON HOLD | OUTPATIENT
Start: 2018-10-02 | End: 2018-10-10 | Stop reason: HOSPADM

## 2018-10-02 RX ORDER — CLONIDINE 0.3 MG/24H
1 PATCH, EXTENDED RELEASE TRANSDERMAL
Qty: 4 PATCH | Refills: 11 | Status: SHIPPED | OUTPATIENT
Start: 2018-10-05 | End: 2018-10-02 | Stop reason: HOSPADM

## 2018-10-02 RX ORDER — HYDRALAZINE HYDROCHLORIDE 25 MG/1
25 TABLET, FILM COATED ORAL EVERY 8 HOURS
Qty: 90 TABLET | Refills: 3 | Status: SHIPPED | OUTPATIENT
Start: 2018-10-02 | End: 2018-11-01

## 2018-10-02 RX ORDER — INSULIN ASPART 100 [IU]/ML
0-5 INJECTION, SOLUTION INTRAVENOUS; SUBCUTANEOUS
Qty: 30 SYRINGE | Refills: 0 | Status: SHIPPED | OUTPATIENT
Start: 2018-10-02 | End: 2018-10-02 | Stop reason: HOSPADM

## 2018-10-02 RX ORDER — INSULIN ASPART 100 [IU]/ML
3 INJECTION, SOLUTION INTRAVENOUS; SUBCUTANEOUS 3 TIMES DAILY
Qty: 2.7 ML | Refills: 11 | Status: SHIPPED | OUTPATIENT
Start: 2018-10-02 | End: 2018-10-02 | Stop reason: HOSPADM

## 2018-10-02 RX ORDER — LANOLIN ALCOHOL/MO/W.PET/CERES
50 CREAM (GRAM) TOPICAL DAILY
Refills: 0 | COMMUNITY
Start: 2018-10-03

## 2018-10-02 RX ORDER — CLONIDINE HYDROCHLORIDE 0.3 MG/1
0.3 TABLET ORAL 3 TIMES DAILY
Status: DISCONTINUED | OUTPATIENT
Start: 2018-10-02 | End: 2018-10-02 | Stop reason: HOSPADM

## 2018-10-02 RX ORDER — INSULIN ASPART 100 [IU]/ML
0-5 INJECTION, SOLUTION INTRAVENOUS; SUBCUTANEOUS
Status: DISCONTINUED | OUTPATIENT
Start: 2018-10-02 | End: 2018-10-02 | Stop reason: HOSPADM

## 2018-10-02 RX ORDER — FLUTICASONE FUROATE AND VILANTEROL 100; 25 UG/1; UG/1
1 POWDER RESPIRATORY (INHALATION) DAILY
Qty: 60 EACH | Refills: 3 | Status: SHIPPED | OUTPATIENT
Start: 2018-10-03 | End: 2018-11-01

## 2018-10-02 RX ORDER — HYDRALAZINE HYDROCHLORIDE 50 MG/1
50 TABLET, FILM COATED ORAL EVERY 8 HOURS
Status: DISCONTINUED | OUTPATIENT
Start: 2018-10-02 | End: 2018-10-02 | Stop reason: HOSPADM

## 2018-10-02 RX ORDER — IBUPROFEN 600 MG/1
600 TABLET ORAL EVERY 8 HOURS PRN
Status: DISCONTINUED | OUTPATIENT
Start: 2018-10-02 | End: 2018-10-02 | Stop reason: HOSPADM

## 2018-10-02 RX ORDER — SIMVASTATIN 40 MG/1
20 TABLET, FILM COATED ORAL NIGHTLY
Qty: 30 TABLET | Refills: 3 | Status: ON HOLD | OUTPATIENT
Start: 2018-10-02 | End: 2018-12-15 | Stop reason: HOSPADM

## 2018-10-02 RX ORDER — ACETAMINOPHEN 325 MG/1
650 TABLET ORAL EVERY 6 HOURS PRN
COMMUNITY
Start: 2018-10-02 | End: 2018-11-01

## 2018-10-02 RX ORDER — CLONIDINE HYDROCHLORIDE 0.3 MG/1
0.3 TABLET ORAL 3 TIMES DAILY
Qty: 90 TABLET | Refills: 3 | Status: SHIPPED | OUTPATIENT
Start: 2018-10-02 | End: 2018-11-01

## 2018-10-02 RX ORDER — INSULIN ASPART 100 [IU]/ML
3 INJECTION, SOLUTION INTRAVENOUS; SUBCUTANEOUS
Status: DISCONTINUED | OUTPATIENT
Start: 2018-10-02 | End: 2018-10-02 | Stop reason: HOSPADM

## 2018-10-02 RX ORDER — DILTIAZEM HYDROCHLORIDE 240 MG/1
240 CAPSULE, COATED, EXTENDED RELEASE ORAL DAILY
Qty: 30 CAPSULE | Refills: 11 | Status: ON HOLD | OUTPATIENT
Start: 2018-10-03 | End: 2018-11-03

## 2018-10-02 RX ORDER — HYDRALAZINE HYDROCHLORIDE 50 MG/1
50 TABLET, FILM COATED ORAL EVERY 8 HOURS
Qty: 90 TABLET | Refills: 11 | Status: SHIPPED | OUTPATIENT
Start: 2018-10-02 | End: 2018-10-02

## 2018-10-02 RX ADMIN — HEPARIN SODIUM 5000 UNITS: 5000 INJECTION, SOLUTION INTRAVENOUS; SUBCUTANEOUS at 01:10

## 2018-10-02 RX ADMIN — ACETAMINOPHEN 1000 MG: 500 TABLET ORAL at 08:10

## 2018-10-02 RX ADMIN — INSULIN DETEMIR 20 UNITS: 100 INJECTION, SOLUTION SUBCUTANEOUS at 08:10

## 2018-10-02 RX ADMIN — CALCIUM CARBONATE (ANTACID) CHEW TAB 500 MG 1000 MG: 500 CHEW TAB at 08:10

## 2018-10-02 RX ADMIN — PYRIDOXINE HCL TAB 50 MG 50 MG: 50 TAB at 08:10

## 2018-10-02 RX ADMIN — FLUTICASONE FUROATE AND VILANTEROL TRIFENATATE 1 PUFF: 100; 25 POWDER RESPIRATORY (INHALATION) at 08:10

## 2018-10-02 RX ADMIN — HYDRALAZINE HYDROCHLORIDE 25 MG: 25 TABLET ORAL at 05:10

## 2018-10-02 RX ADMIN — INSULIN ASPART 3 UNITS: 100 INJECTION, SOLUTION INTRAVENOUS; SUBCUTANEOUS at 12:10

## 2018-10-02 RX ADMIN — CLOPIDOGREL 75 MG: 75 TABLET, FILM COATED ORAL at 08:10

## 2018-10-02 RX ADMIN — LOSARTAN POTASSIUM 100 MG: 50 TABLET, FILM COATED ORAL at 08:10

## 2018-10-02 RX ADMIN — DILTIAZEM HYDROCHLORIDE 240 MG: 120 CAPSULE, COATED, EXTENDED RELEASE ORAL at 08:10

## 2018-10-02 RX ADMIN — IBUPROFEN 600 MG: 600 TABLET ORAL at 12:10

## 2018-10-02 RX ADMIN — INSULIN ASPART 3 UNITS: 100 INJECTION, SOLUTION INTRAVENOUS; SUBCUTANEOUS at 05:10

## 2018-10-02 RX ADMIN — PREDNISONE 10 MG: 10 TABLET ORAL at 08:10

## 2018-10-02 RX ADMIN — PANTOPRAZOLE SODIUM 40 MG: 40 TABLET, DELAYED RELEASE ORAL at 08:10

## 2018-10-02 RX ADMIN — ASPIRIN 81 MG: 81 TABLET, COATED ORAL at 08:10

## 2018-10-02 RX ADMIN — HEPARIN SODIUM 5000 UNITS: 5000 INJECTION, SOLUTION INTRAVENOUS; SUBCUTANEOUS at 05:10

## 2018-10-02 RX ADMIN — CLONIDINE HYDROCHLORIDE 0.3 MG: 0.3 TABLET ORAL at 05:10

## 2018-10-02 RX ADMIN — HYDRALAZINE HYDROCHLORIDE 50 MG: 50 TABLET ORAL at 01:10

## 2018-10-02 NOTE — PLAN OF CARE
[facility]    HOME HEALTH ORDERS  FACE TO FACE ENCOUNTER    Patient Name: Sheryl Martines  YOB: 1955    PCP: Primary Doctor No   PCP Address: None  PCP Phone Number: None  PCP Fax: None    Encounter Date: 10/02/2018    Admit to Home Health    Diagnoses:  Active Hospital Problems    Diagnosis  POA    *Altered mental status [R41.82]  Yes    Acute encephalopathy [G93.40]  Yes    Atrial fibrillation [I48.91]  Yes    Tachycardia [R00.0]  Yes    Encephalopathy, metabolic [G93.41]  Yes    Abdominal infection [K65.9]  Yes    Essential hypertension [I10]  Yes    Gastroesophageal reflux disease without esophagitis [K21.9]  Yes    Debilitated patient [R53.81]  Yes    S/P appendectomy [Z90.49]  Not Applicable    Moderate asthma without complication [J45.909]  Yes    (HFpEF) heart failure with preserved ejection fraction [I50.30]  Yes    Clostridium difficile infection [B96.89]  Yes    NIKHIL (acute kidney injury) [N17.9]  Unknown    Insulin dependent diabetes mellitus [E11.9, Z79.4]  Not Applicable    CAD (coronary artery disease) [I25.10]  Yes    Closed compression fracture of fourth lumbar vertebra [S32.040A]  Yes      Resolved Hospital Problems   No resolved problems to display.       Future Appointments   Date Time Provider Department Center   10/3/2018  3:00 PM INFECTIOUS DISEASE, [facility] INFECT [facility]     Follow-up Information     Schedule an appointment as soon as possible for a visit with Primary Doctor No.    Why:  As needed, If symptoms worsen           Go to [facility].    Specialty:  Emergency Medicine  Why:  If symptoms worsen acutely  Contact information:  [address]  [facility] [address] [phone]                   I have seen and examined this patient face to face today. My clinical findings that support the need for the home health skilled services and home bound status are the following:  Weakness/numbness  causing balance and gait disturbance due to Surgery making it taxing to leave home.  Medical restrictions requiring assistance of another human to leave home due to  Post surgery monitoring.    Allergies:  Review of patient's allergies indicates:   Allergen Reactions    Ace inhibitors Swelling    Corticosteroids (glucocorticoids)     Hydralazine analogues     Tetracyclines Swelling    Travatan (with benzalkonium) [travoprost (benzalkonium)]        Diet: diabetic diet: 1800 calorie    Activities: activity as tolerated, no driving    Nursing:   SN to complete comprehensive assessment including routine vital signs. Instruct on disease process and s/s of complications to report to MD. Review/verify medication list sent home with the patient at time of discharge  and instruct patient/caregiver as needed. Frequency may be adjusted depending on start of care date.    Notify MD if SBP > 160 or < 90; DBP > 90 or < 50; HR > 120 or < 50; Temp > 101;       CONSULTS:    Physical Therapy to evaluate and treat. Evaluate for home safety and equipment needs; Establish/upgrade home exercise program. Perform / instruct on therapeutic exercises, gait training, transfer training, and Range of Motion.  Occupational Therapy to evaluate and treat. Evaluate home environment for safety and equipment needs. Perform/Instruct on transfers, ADL training, ROM, and therapeutic exercises.   to evaluate for community resources/long-range planning.  Aide to provide assistance with personal care, ADLs, and vital signs.    Medications: Review discharge medications with patient and family and provide education.      Current Discharge Medication List      START taking these medications    Details   acetaminophen (TYLENOL) 325 MG tablet Take 2 tablets (650 mg total) by mouth every 6 (six) hours as needed for Pain.      cloNIDine 0.3 mg/24 hr td ptwk (CATAPRES) 0.3 mg/24 hr Place 1 patch onto the skin every 7 days.  Qty: 4 patch, Refills:  11      fluticasone-vilanterol (BREO) 100-25 mcg/dose diskus inhaler Inhale 1 puff into the lungs once daily. Controller  Qty: 60 each, Refills: 3      hydrALAZINE (APRESOLINE) 50 MG tablet Take 1 tablet (50 mg total) by mouth every 8 (eight) hours.  Qty: 90 tablet, Refills: 11      insulin aspart U-100 (NOVOLOG) 100 unit/mL InPn pen Inject 3 Units into the skin 3 (three) times daily.  Qty: 2.7 mL, Refills: 11      insulin detemir U-100 (LEVEMIR FLEXTOUCH) 100 unit/mL (3 mL) SubQ InPn pen Inject 24 Units into the skin once daily.  Qty: 7.2 mL, Refills: 11         CONTINUE these medications which have CHANGED    Details   diltiaZEM (CARDIZEM CD) 240 MG 24 hr capsule Take 1 capsule (240 mg total) by mouth once daily.  Qty: 30 capsule, Refills: 11      pyridoxine, vitamin B6, (VITAMIN B-6) 50 MG Tab Take 1 tablet (50 mg total) by mouth once daily.  Refills: 0      simvastatin (ZOCOR) 40 MG tablet Take 0.5 tablets (20 mg total) by mouth every evening.  Qty: 30 tablet, Refills: 3         CONTINUE these medications which have NOT CHANGED    Details   albuterol-ipratropium (DUO-NEB) 2.5 mg-0.5 mg/3 mL nebulizer solution Take 3 mLs by nebulization every 4 (four) hours. Rescue      aspirin (ECOTRIN) 81 MG EC tablet Take 1 tablet (81 mg total) by mouth once daily.  Qty: 30 tablet, Refills: 11      clopidogrel (PLAVIX) 75 mg tablet Take 75 mg by mouth once daily.      DULoxetine (CYMBALTA) 60 MG capsule Take 60 mg by mouth once daily.      furosemide (LASIX) 40 MG tablet Take 40 mg by mouth once daily.       Lactobacillus acidophilus 1 billion cell Cap Take 1 tablet by mouth 2 (two) times daily.      losartan (COZAAR) 100 MG tablet Take 100 mg by mouth once daily.       megestrol (MEGACE) 40 MG Tab Take 40 mg by mouth 2 (two) times daily.      ondansetron (ZOFRAN) 4 MG tablet Take 1 tablet (4 mg total) by mouth every 6 (six) hours as needed.  Qty: 30 tablet, Refills: 1      pantoprazole (PROTONIX) 40 MG tablet Take 40 mg by  mouth once daily.      predniSONE (DELTASONE) 10 MG tablet Take 10 mg by mouth 2 (two) times daily.       senna-docusate 8.6-50 mg (SENNA WITH DOCUSATE SODIUM) 8.6-50 mg per tablet Take 1 tablet by mouth 2 (two) times daily.      simethicone 80 mg Tab Take 160 mg by mouth every 6 (six) hours as needed for Flatulence.         STOP taking these medications       enoxaparin (LOVENOX) 40 mg/0.4 mL Syrg Comments:   Reason for Stopping:         insulin aspart U-100 (NOVOLOG) 100 unit/mL injection Comments:   Reason for Stopping:         insulin detemir U-100 (LEVEMIR) 100 unit/mL injection Comments:   Reason for Stopping:         prazosin (MINIPRESS) 5 MG capsule Comments:   Reason for Stopping:         theophylline (THEODUR) 200 MG 12 hr tablet Comments:   Reason for Stopping:         lansoprazole (PREVACID) 30 MG capsule Comments:   Reason for Stopping:               I certify that this patient is confined to her home and needs intermittent skilled nursing care, physical therapy and occupational therapy.

## 2018-10-02 NOTE — PLAN OF CARE
Problem: Patient Care Overview  Goal: Plan of Care Review  Outcome: Ongoing (interventions implemented as appropriate)  Pt. AAOx4. IV antibiotics continue. Pt is up to chair with 2x assist. Pt incontinent of urine, but utilizes bedpan for BM. Pt. C/o pain in LRQ. Tylenol PO administered per MAR. Pt had noacute issues during shift. Possible DC to SNF in the AM. WCTM.

## 2018-10-02 NOTE — ASSESSMENT & PLAN NOTE
Paroxysmal AF onset 9/24 AM  RVR today with HR to ~115 and -170s  - Increased dilt from 180 to 240mg today (9/25), rate control has been much better <100 in following days  - Currently on Heparin 5000U q8  - Contraindication for BB due to severe asthma on long term PO pred and albuterol nebs  - Resolved on repeat EKG

## 2018-10-02 NOTE — ASSESSMENT & PLAN NOTE
Pt presents from SNF with AMS and abdo pain (although pt denies AMS and abdominal pain during review of systems).  - CT head showed no acute path  - Daughter believes pt is altered also, poor speech articulation and concentration. Believes may be due to opioid medications  - Infectious workup negative thus far, opioids held but no tangible improvement in mental status    Plan:  - Started Vanc and Zosyn, continue until pelvic fluid collection has resolved on imaging follow up as per ID recs  - Low dose maintenance fluids as poor cooperation to PO intake  - Continue to hold opioids  - Delirium precautions  - Consider neurology consult  - Continue vancomycin and Zosyn until repeat imaging per ID recs  - Repeat CT head 9/26 without acute abnormality, CXR also NAD  - Likely CO2 narcosis, as pt with respiratory acidosis with pH 7.31 pCO2 of 68  - AMS markedly improved following bipap  - Will encourage nightly CPAP to prevent CO2 retention  - CT abdo results show stable/shrinking R-pelvic seroma/hematoma without concern of infxn. D/c vanc/zosyn per d/c recs  - f/u OP if infections symptoms return

## 2018-10-02 NOTE — PLAN OF CARE
Problem: Occupational Therapy Goal  Goal: Occupational Therapy Goal  Goals to be met by: 10/9/18    Patient will increase functional independence with ADLs by performing:    Feeding with Minimal Assistance.  UE Dressing with Minimal Assistance.  LE Dressing with Maximum assistance. MET 9/28  REVISED to SBA.  Grooming while seated at sink with Minimal Assistance. MET 10/2  REVISED to in standing with SBA.  Toileting from bedside commode with Moderate Assistance for hygiene and clothing management.   Supine to sit with Minimal Assistance. MET 9/28  REVISED to SBA.  Toilet transfer to toilet with Moderate Assistance.      Outcome: Ongoing (interventions implemented as appropriate)  Con't POC.    NAVI Wiggins

## 2018-10-02 NOTE — PLAN OF CARE
Pt accepted to [facility].  WC Van transport arranged through [facility] ([phone]) for 430pm.  Transport packet sent to 8th floor tower via tube system.        Tamika Kaminski LMSW

## 2018-10-02 NOTE — TELEPHONE ENCOUNTER
Called pt to reschedule hospital f/u. UI was unable to leave a message due to voicemail being full.

## 2018-10-02 NOTE — PLAN OF CARE
Problem: Physical Therapy Goal  Goal: Physical Therapy Goal  Goals to be met by: 10/1     Patient will increase functional independence with mobility by performin. Supine to sit with Contact Guard Assistance-met  2. Sit to supine with Contact Guard Assistance  3. Sit to stand transfer with Minimal Assistance with RW-met  4. Bed to chair transfer with Minimal Assistance using Rolling Walker-met  5. Gait  x 25 feet with Minimal Assistance using Rolling Walker.      Pt progressing towards goals. continue with PT POC.Goals remain appropriate.  Edwardo Wu PTA  10/2/2018

## 2018-10-02 NOTE — PLAN OF CARE
Problem: Patient Care Overview  Goal: Plan of Care Review  Outcome: Ongoing (interventions implemented as appropriate)  Overnight, pt complained of back pain. Heat packs applied and PRN tylenol given with mild relief noted. IV abx not given per MD order and ID recommendation.  No falls or injury during shift. Possible discharge home with home health today. Call light in reach. [facility]

## 2018-10-02 NOTE — NURSING
Patient discharged home per wheelchair. IV removed, patient has all discharge education and information. Patient left with all belongings accompained per daughter.

## 2018-10-02 NOTE — ASSESSMENT & PLAN NOTE
- Continue insulin detemir 7u BID  - Continue LDSSI  - Diabetic diet  - Monitor for hypo/hyperglycemia  - Hyperglycemia to 451 overnight in the setting of increased IV steroid dosing for respiratory acidosis and suspected asthma exacerbation, given 27U lantus and 12U detemir overnight -->   - Currently being weaned off of IV methylpred 60mg bd back onto PO pred 10mg bd home dose yesterday  -  overnight 9/29, insulin gtt --> BGL 200s, transitioned to basal/prandial dosing will adjust as needed  - Regimen change to insulin detemir 24U basal + 3U  aspart AC today    -Last A1c reviewed-   Lab Results   Component Value Date    HGBA1C 6.4 (H) 09/26/2018     Inpatient Antihyperglycemic Regiment:   Antihyperglycemics (From admission, onward)    Start     Stop Route Frequency Ordered    10/03/18 0900  insulin detemir U-100 pen 24 Units      -- SubQ Daily 10/02/18 0904    10/02/18 1130  insulin aspart U-100 pen 3 Units      -- SubQ 3 times daily with meals 10/02/18 0904    10/02/18 1006  insulin aspart U-100 pen 0-5 Units      -- SubQ Before meals & nightly PRN 10/02/18 0907    09/29/18 0400  insulin regular (Humulin R) 100 Units in sodium chloride 0.9% 100 mL infusion     Question:  Insulin Rate Adjustment (DO NOT MODIFY ANSWER)  Answer:  [email or web address]    09/29 1447 IV Continuous 09/29/18 0256        -LSSI  -ACCU Checks ACHS  -Diabetic Diet 2000 aubrey  -Diabetic nutritional counseling given     Blood Sugars (AccuCheck):    Recent Labs      10/01/18   0750  10/01/18   1133  10/01/18   1639  10/01/18   2048  10/02/18   0817  10/02/18   1245   POCTGLUCOSE  239*  223*  271*  259*  178*  226*

## 2018-10-02 NOTE — PLAN OF CARE
SW received call from [facility] RN, stating that pt's daughter would be picking her up this evening.  SPD transport cancelled for 430.          Tamika Kaminski LMSW

## 2018-10-02 NOTE — ASSESSMENT & PLAN NOTE
Cr 1.4 on admit. Baseline 0.8. Likely pre-renal 2/2 poor intake in setting of AMS  Trending 1.4 > 0.9 > 1.0 > 0.9 > 0.8 -> 1.3 -> 0.9  - UA NAD  - +2.9L/24 hrs after admission  - Patient refusing PO intake, family expressing concern pt not drinking. Started on slow maintenance fluids  - Monitor BMP daily  - Pt w/ recurring NIKHIL whenever maintenance fluids stopped as refuses PO intake with AMS and frusemide for CHF and HTN. Have held frusemide for now and IVF for po challenge  - Renal function now stabilized at baseline off of IVF

## 2018-10-02 NOTE — ASSESSMENT & PLAN NOTE
Pt with chronic back pain. S/p spinal stimulator placement    - Avoiding opiates for now because of AMS  - Baclofen PRN for pain  - Started gabapentin   - Holding baclofen and gabapentin while AMS

## 2018-10-02 NOTE — DISCHARGE SUMMARY
[facility] Hospital Medicine  Discharge Summary      Patient Name: Sheryl Martines  MRN: [id number]  Admission Date: 9/23/2018  Hospital Length of Stay: 7 days  Discharge Date and Time:  10/02/2018 5:40 PM  Attending Physician: Servando García, *   Discharging Provider: Wayne Morris MD  Primary Care Provider: Primary Doctor [facility] Medicine Team: [facility] HOSP MED 1 Wayne Morris MD    HPI:   Ms. Martines is a 63-year-old female with recent appendicitis s/p appendectomy, COPD, IDDM, CAD, pontine stroke (2012) presents from SNF for intermittent altered mental status (per SNF) and lethargy of 2 days. Patient denies AMS herself and is attributing drowsiness to pain medications.   She also c/o loose stool and dizziness. She denied fever, chills, headache, chest pain, SOB, nausea, vomiting, dysuria.    Patient currently living in rehab facility for reconditioning and treatment of wound infection s/p appendectomy on 8/26. On previous admission, she presented with slurred speech and AMS. EEG was done at the time and did not show any seizure activity. She found to have appendicitis with peritonitis, and underwent open appendectomy. Her abdominal wound cultures positive for  MRSA, enterococcus, & e. coli.  Also positive for c. diff during admission. She was discharged with PO vanc (stop Sept 13), clinda (1 week), and flagyl (stop Sept 13) per ID/ surgery.    At SNF, she was on Ertapenem (started 9/20) and Vancomycin (started 9/21) until this transfered to ED for present admission.      * No surgery found *      Hospital Course:   General surgery consulted and do not believe pt has current surgical complication or secondary infection. Open appy wound site is now healed and not infected at present, intrabdominal RLQ pelvic collection (believed to be hematoma or seroma) is shrinking on abdo CT comparison to previous imaging and not convincing for infection given no WCC or fever per gen surg. CT-H is  negative for acute intracranial process. EEG shows no epileptiform activity. Blood cultures are NGTD.  Made NPO temporarily per SLP recs due to concern for aspiration risk following CO2 narcosis 9/26 (pH 7.31 CO2 69) with decreased mentation. Pt started on bipap with resolution of acidosis and improvement of mental status. 9/28 mental status continued to improve and pt is now fully AAOx3 with good concentration, spontaneous eye opening, and ability to eat and drink. AMS at presentation likely due to CO2 retention. 10/2 Zosyn/Vanc has been d/c as per ID recs as pelvic collection not concern for infection at this time and slowly receding. Pt now feels at her baseline, no further infectious or respiratory symptoms.     Physical Exam   Constitutional: She is oriented to person, place, and time. She appears well-developed and well-nourished. No distress.   Pt sitting up on side of bed with physio. AAOx3, good concentration and appropriate responses to Qs   HENT:   Head: Normocephalic and atraumatic.   Eyes: Conjunctivae are normal. No scleral icterus.   Neck: Neck supple. No JVD present. No tracheal deviation present.   Cardiovascular: Normal rate, regular rhythm, normal heart sounds and intact distal pulses. Exam reveals no gallop and no friction rub.   Pulmonary/Chest: Effort normal and breath sounds normal. No stridor. No respiratory distress. She has no wheezes. She has no rales. She exhibits no tenderness.   Abdominal: Soft. Bowel sounds are normal. She exhibits no mass. There is tenderness. There is no rebound and no guarding.   Musculoskeletal: She exhibits no edema or tenderness.   Neurological: She is alert and oriented to person, place, and time.   Skin: Skin is warm and dry. She is not diaphoretic.   Psychiatric: She has a normal mood and affect. Her behavior is normal.   Vitals reviewed.        Consults:   Consults (From admission, onward)        Status Ordering Provider     Inpatient consult to Infectious  Diseases  Once     Provider:  (Not yet assigned)    Completed JODIE MCCLOUD          * Altered mental status    Pt presents from SNF with AMS and abdo pain (although pt denies AMS and abdominal pain during review of systems).  - CT head showed no acute path  - Daughter believes pt is altered also, poor speech articulation and concentration. Believes may be due to opioid medications  - Infectious workup negative thus far, opioids held but no tangible improvement in mental status    Plan:  - Started Vanc and Zosyn, continue until pelvic fluid collection has resolved on imaging follow up as per ID recs  - Low dose maintenance fluids as poor cooperation to PO intake  - Continue to hold opioids  - Delirium precautions  - Consider neurology consult  - Continue vancomycin and Zosyn until repeat imaging per ID recs  - Repeat CT head 9/26 without acute abnormality, CXR also NAD  - Likely CO2 narcosis, as pt with respiratory acidosis with pH 7.31 pCO2 of 68  - AMS markedly improved following bipap  - Will encourage nightly CPAP to prevent CO2 retention  - CT abdo results show stable/shrinking R-pelvic seroma/hematoma without concern of infxn. D/c vanc/zosyn per ID recs  - f/u OP if infections symptoms return        Atrial fibrillation    Paroxysmal AF onset 9/24 AM  RVR today with HR to ~115 and -170s  - Increased dilt from 180 to 240mg today (9/25), rate control has been much better <100 in following days  - Currently on Heparin 5000U q8  - Contraindication for BB due to severe asthma on long term PO pred and albuterol nebs  - Resolved on repeat EKG        Essential hypertension    9/27 - Continues to have very labile Bps throughout admission. Hypertensive crisis late this morning with  and new onset headache, due to missed medication dosing yesterday because NPO due to aspiration risk. Pt given 25mg hydralazine in addition to regular medication dosing:  - losartan 100mg  - Prazosin 5mg  - lasix PO 40  od  - Dilt 180 --> 240mg od  - Started clonidine 0.3mg patch due to poor BP control even before missed dosing (-180 spikes throughout hospital stay)  - prazosin d/c + started low dose hydralazine today, tolerating well so far        (HFpEF) heart failure with preserved ejection fraction    ESSENTIAL HYPERTENSION    Last ECHO 8/23/18 showing EF 60-65%, grade 1 diastolic dysfunction     - Lasix 40 mg daily  - Losartan 100 mg daily  - Dilt 180mg -> 240mg  - Prazosin d/c  - low dose hydralazine, titrate as needed            Moderate asthma without complication    - Breo daily  - Theophylline 200 mg daily  -  Albuterol PRN  - Pt was on prednisone 10mg daily per daughter  - Relative contraindication for beta blockers in HTN/AF mgmt given significant difficult to control asthma on daily oral pred  - Start duonebs q6h wake  - Start Solumedrol 60mg IV q6h per Critical Care recs  - 9/27 decreased to solumedrol 60mg q12h  - 9/28 returned to home dose PO pred 10mg bd  - 9/30 Pt now on rm air doing well              S/P appendectomy    Pt recently hospitalized for appendicitis with peritonitis. She had open appendectomy 8/26 with wound cultures positive for MRSA, enterococcus, and E.col. She was discharged with PO vanc (stop Sept 13), clinda (1 week), and flagyl (stop Sept 13).   At SNF , she was on Ertapenem (started 9/20) and Vancomycin (started 9/21) until this transfered to ED for present admission    A/P per gen surg:  - Unconvinced of SBP/ acute abdomen, or surgical site infection - no WCC no fever, no signs infection at incision site  - RLQ pelvic fluid collection on CT but has been shrinking on its own when compared to previous imaging, do not recommend draining at this time. Likely hematoma or seroma  - Continue Vanc and Zosyn per ID until resolution of pelvic fluid collection on follow up imaging  - CT abdo pending, if collection shrinking can relay estimate to d/c Abs to SNF for 1-2wks         NIKHIL (acute kidney  injury)    Cr 1.4 on admit. Baseline 0.8. Likely pre-renal 2/2 poor intake in setting of AMS  Trending 1.4 > 0.9 > 1.0 > 0.9 > 0.8 -> 1.3 -> 0.9  - UA NAD  - +2.9L/24 hrs after admission  - Patient refusing PO intake, family expressing concern pt not drinking. Started on slow maintenance fluids  - Monitor BMP daily  - Pt w/ recurring NIKHIL whenever maintenance fluids stopped as refuses PO intake with AMS and frusemide for CHF and HTN. Have held frusemide for now and IVF for po challenge  - Renal function now stabilized at baseline off of IVF          Closed compression fracture of fourth lumbar vertebra    Pt with chronic back pain. S/p spinal stimulator placement    - Avoiding opiates for now because of AMS  - Baclofen PRN for pain  - Started gabapentin   - Holding baclofen and gabapentin while AMS        Insulin dependent diabetes mellitus    - Continue insulin detemir 7u BID  - Continue LDSSI  - Diabetic diet  - Monitor for hypo/hyperglycemia  - Hyperglycemia to 451 overnight in the setting of increased IV steroid dosing for respiratory acidosis and suspected asthma exacerbation, given 27U lantus and 12U detemir overnight -->   - Currently being weaned off of IV methylpred 60mg bd back onto PO pred 10mg bd home dose yesterday  -  overnight 9/29, insulin gtt --> BGL 200s, transitioned to basal/prandial dosing will adjust as needed  - Regimen change to insulin detemir 24U basal + 3U  aspart AC today    -Last A1c reviewed-   Lab Results   Component Value Date    HGBA1C 6.4 (H) 09/26/2018     Inpatient Antihyperglycemic Regiment:   Antihyperglycemics (From admission, onward)    Start     Stop Route Frequency Ordered    10/03/18 0900  insulin detemir U-100 pen 24 Units      -- SubQ Daily 10/02/18 0904    10/02/18 1130  insulin aspart U-100 pen 3 Units      -- SubQ 3 times daily with meals 10/02/18 0904    10/02/18 1006  insulin aspart U-100 pen 0-5 Units      -- SubQ Before meals & nightly PRN 10/02/18  0907    09/29/18 0400  insulin regular (Humulin R) 100 Units in sodium chloride 0.9% 100 mL infusion     Question:  Insulin Rate Adjustment (DO NOT MODIFY ANSWER)  Answer:  [email or web address]    09/29 1447 IV Continuous 09/29/18 0256        -LSSI  -ACCU Checks ACHS  -Diabetic Diet 2000 aubrey  -Diabetic nutritional counseling given     Blood Sugars (AccuCheck):    Recent Labs      10/01/18   0750  10/01/18   1133  10/01/18   1639  10/01/18   2048  10/02/18   0817  10/02/18   1245   POCTGLUCOSE  239*  223*  271*  259*  178*  226*                     Final Active Diagnoses:    Diagnosis Date Noted POA    PRINCIPAL PROBLEM:  Altered mental status [R41.82] 09/23/2018 Yes    Acute encephalopathy [G93.40]  Yes    Atrial fibrillation [I48.91]  Yes    Tachycardia [R00.0]  Yes    Encephalopathy, metabolic [G93.41]  Yes    Abdominal infection [K65.9]  Yes    Essential hypertension [I10] 09/21/2018 Yes    Gastroesophageal reflux disease without esophagitis [K21.9] 09/21/2018 Yes    Debilitated patient [R53.81]  Yes    S/P appendectomy [Z90.49] 09/11/2018 Not Applicable    Moderate asthma without complication [J45.909] 09/11/2018 Yes    (HFpEF) heart failure with preserved ejection fraction [I50.30] 09/11/2018 Yes    Clostridium difficile infection [B96.89] 08/30/2018 Yes    NIKHIL (acute kidney injury) [N17.9]  Unknown    Insulin dependent diabetes mellitus [E11.9, Z79.4] 08/23/2018 Not Applicable    CAD (coronary artery disease) [I25.10] 08/23/2018 Yes    Closed compression fracture of fourth lumbar vertebra [S32.040A] 08/23/2018 Yes      Problems Resolved During this Admission:       Discharged Condition: good    Disposition: Home-Health Care [facility]    Follow Up:  Follow-up Information     Go to [facility].    Specialty:  Emergency Medicine  Why:  If symptoms worsen acutely  Contact information:  Raymond Gee  [facility]  [id number] [phone]           Schedule an appointment as soon as possible for a visit with [facility] PULMONARY MEDICINE.    Specialty:  Pulmonology  Why:  Establish care for asthma management  Contact information:  Shakira Gee  [facility] [address]  [phone]           Sudhakar Brasher MD.    Specialty:  Pulmonary Disease  Contact information:  [address]  [facility] [address]  [phone]                 Patient Instructions:   No discharge procedures on file.    Significant Diagnostic Studies: Labs:   CMP   Recent Labs   Lab  10/01/18   0500  10/02/18   0522   NA  140  141  140   K  3.9  4.0  3.9   CL  104  105  106   CO2  29  30*  30*   GLU  183*  182*  279*   BUN  14  14  13   CREATININE  0.8  0.8  0.9   CALCIUM  8.7  8.6*  8.9   PROT  5.2*   --    ALBUMIN  2.5*   --    BILITOT  0.5   --    ALKPHOS  68   --    AST  17   --    ALT  24   --    ANIONGAP  7*  6*  4*   ESTGFRAFRICA  >60.0  >60.0  >60.0   EGFRNONAA  >60.0  >60.0  >60.0   , CBC   Recent Labs   Lab  10/01/18   0500  10/02/18   0522   WBC  7.34  7.93   HGB  9.2*  9.4*   HCT  31.3*  31.6*   PLT  193  175    and All labs within the past 24 hours have been reviewed    Pending Diagnostic Studies:     None         Medications:  Reconciled Home Medications:      Medication List      START taking these medications    acetaminophen 325 MG tablet  Commonly known as:  TYLENOL  Take 2 tablets (650 mg total) by mouth every 6 (six) hours as needed for Pain.     cloNIDine 0.3 MG tablet  Commonly known as:  CATAPRES  Take 1 tablet (0.3 mg total) by mouth 3 (three) times daily.     fluticasone-vilanterol 100-25 mcg/dose diskus inhaler  Commonly known as:  BREO  Inhale 1 puff into the lungs once daily. Controller     hydrALAZINE 25 MG tablet  Commonly known as:  APRESOLINE  Take 1 tablet (25 mg total) by mouth every 8 (eight) hours.     insulin detemir U-100 100 unit/mL (3 mL) Inpn pen  Commonly known as:  LEVEMIR  FLEXTOUCH  Inject 24 Units into the skin once daily.  Replaces:  insulin detemir U-100 100 unit/mL injection     insulin lispro 100 unit/mL Inpn pen  Commonly known as:  HUMALOG KWIKPEN  Inject 3 Units into the skin 3 (three) times daily with meals.        CHANGE how you take these medications    diltiaZEM 240 MG 24 hr capsule  Commonly known as:  CARDIZEM CD  Take 1 capsule (240 mg total) by mouth once daily.  What changed:    · medication strength  · how much to take     simvastatin 40 MG tablet  Commonly known as:  ZOCOR  Take 0.5 tablets (20 mg total) by mouth every evening.  What changed:  how much to take        CONTINUE taking these medications    albuterol-ipratropium 2.5 mg-0.5 mg/3 mL nebulizer solution  Commonly known as:  DUO-NEB  Take 3 mLs by nebulization every 4 (four) hours. Rescue     aspirin 81 MG EC tablet  Commonly known as:  ECOTRIN  Take 1 tablet (81 mg total) by mouth once daily.     clopidogrel 75 mg tablet  Commonly known as:  PLAVIX  Take 75 mg by mouth once daily.     COZAAR 100 MG tablet  Generic drug:  losartan  Take 100 mg by mouth once daily.     DULoxetine 60 MG capsule  Commonly known as:  CYMBALTA  Take 60 mg by mouth once daily.     furosemide 40 MG tablet  Commonly known as:  LASIX  Take 40 mg by mouth once daily.     Lactobacillus acidophilus 1 billion cell Cap  Take 1 tablet by mouth 2 (two) times daily.     megestrol 40 MG Tab  Commonly known as:  MEGACE  Take 40 mg by mouth 2 (two) times daily.     ondansetron 4 MG tablet  Commonly known as:  ZOFRAN  Take 1 tablet (4 mg total) by mouth every 6 (six) hours as needed.     pantoprazole 40 MG tablet  Commonly known as:  PROTONIX  Take 40 mg by mouth once daily.     predniSONE 10 MG tablet  Commonly known as:  DELTASONE  Take 10 mg by mouth 2 (two) times daily.     pyridoxine (vitamin B6) 50 MG Tab  Commonly known as:  VITAMIN B-6  Take 1 tablet (50 mg total) by mouth once daily.     SENNA WITH DOCUSATE SODIUM 8.6-50 mg per  tablet  Generic drug:  senna-docusate 8.6-50 mg  Take 1 tablet by mouth 2 (two) times daily.     simethicone 80 mg Tab  Take 160 mg by mouth every 6 (six) hours as needed for Flatulence.        STOP taking these medications    enoxaparin 40 mg/0.4 mL Syrg  Commonly known as:  LOVENOX     insulin aspart U-100 100 unit/mL injection  Commonly known as:  NOVOLOG     insulin detemir U-100 100 unit/mL injection  Commonly known as:  LEVEMIR  Replaced by:  insulin detemir U-100 100 unit/mL (3 mL) Inpn pen     prazosin 5 MG capsule  Commonly known as:  MINIPRESS     theophylline 200 MG 12 hr tablet  Commonly known as:  THEODUR            Indwelling Lines/Drains at time of discharge:   Lines/Drains/Airways     Central Venous Catheter Line                 Port A Cath Single Lumen right subclavian -- days          Drain            Female External Urinary Catheter 09/26/18 2215 5 days          Pressure Ulcer                 Pressure Injury 09/11/18 1515 medial Sacral spine Stage 2 21 days                Time spent on the discharge of patient: 45 minutes  Patient was seen and examined on the date of discharge and determined to be suitable for discharge.         Wayne Morris MD  Department of Hospital Medicine  [facility]

## 2018-10-02 NOTE — PT/OT/SLP PROGRESS
Physical Therapy Treatment    Patient Name:  Sheryl Martines   MRN:  [id number]    Recommendations:     Discharge Recommendations:  nursing facility, skilled   Discharge Equipment Recommendations: (TBD)   Barriers to discharge: Decreased caregiver support    Assessment:     Sheryl Martines is a 63 y.o. female admitted with a medical diagnosis of Altered mental status.  She presents with the following impairments/functional limitations:  weakness, impaired endurance, impaired self care skills, gait instability, impaired functional mobilty, impaired balance, decreased lower extremity function, decreased coordination .Pt tolerated treatment fairly well and is progressing slowly with mobility. Pt would continue to benefit from skilled PT to address overall functional mobility and goals. Goals remain appropriate      Rehab Prognosis:  good; patient would benefit from acute skilled PT services to address these deficits and reach maximum level of function.      Recent Surgery: * No surgery found *      Plan:     During this hospitalization, patient to be seen 4 x/week to address the above listed problems via gait training, therapeutic activities, therapeutic exercises  · Plan of Care Expires:  10/24/18   Plan of Care Reviewed with: patient    Subjective     Communicated with RN prior to session.  Patient found supine upon PT entry to room, agreeable to treatment.      Chief Complaint: fatigue  Patient comments/goals: I need to get on the bedpan  Pain/Comfort:  · Pain Rating 1: 0/10  · Location - Side 1: Bilateral  · Location - Orientation 1: lower  · Location 1: back  · Pain Addressed 1: Reposition, Cessation of Activity, Distraction  · Pain Rating Post-Intervention 1: (did not rate)    Patients cultural, spiritual, Christianity conflicts given the current situation: none noted    Objective:     Patient found with: telemetry, peripheral IV, pulse ox (continuous)     General Precautions: Standard, fall   Orthopedic  Precautions:N/A   Braces: N/A     Functional Mobility:  · Bed Mobility:     · Rolling Right: stand by assistance  · Rolling Left:  stand by assistance  · Pt assisted with pericleaning  · Scooting: contact guard assistance  · Supine to Sit: contact guard assistance  · Transfers:     · Sit to Stand:  minimum assistance with rolling walker and 4 wheeled walker  · SPT bed to chair with RW with CGA  · Gait: pt ambulated with rollator x 3 ft to chair  with CGA with vcs for upright posture. Pt declined to ambulated further due to fatigue          AM-PAC 6 CLICK MOBILITY  Turning over in bed (including adjusting bedclothes, sheets and blankets)?: 3  Sitting down on and standing up from a chair with arms (e.g., wheelchair, bedside commode, etc.): 3  Moving from lying on back to sitting on the side of the bed?: 3  Moving to and from a bed to a chair (including a wheelchair)?: 3  Need to walk in hospital room?: 3  Climbing 3-5 steps with a railing?: 1  Basic Mobility Total Score: 16       Therapeutic Activities and Exercises:   Discussed/educated patient on progress, safety, importance of OOB mobility for improving outcomes, d/c, PT POC   White board updated in patients room to current assistance level   Donned an extra gown    Bedside table in front of patient and area set up for function, convenience, and safety. RN aware of patient's mobility needs and status. Questions/concerns addressed within PTA scope of practice; patient with no further questions.       Patient left up in chair with all lines intact, call button in reach and nsg notified..    GOALS:   Multidisciplinary Problems     Physical Therapy Goals        Problem: Physical Therapy Goal    Goal Priority Disciplines Outcome Goal Variances Interventions   Physical Therapy Goal     PT, PT/OT Ongoing (interventions implemented as appropriate)     Description:  Goals to be met by: 10/1     Patient will increase functional independence with mobility by performin.  Supine to sit with Contact Guard Assistance-met  2. Sit to supine with Contact Guard Assistance  3. Sit to stand transfer with Minimal Assistance with RW-met  4. Bed to chair transfer with Minimal Assistance using Rolling Walker-met  5. Gait  x 25 feet with Minimal Assistance using Rolling Walker.                        Time Tracking:     PT Received On: 10/02/18  PT Start Time: 0756     PT Stop Time: 0820  PT Total Time (min): 24 min     Billable Minutes: Therapeutic Activity 24    Treatment Type: Treatment  PT/PTA: PTA     PTA Visit Number: 3     Edwardo Wu PTA  10/02/2018

## 2018-10-03 ENCOUNTER — HOSPITAL ENCOUNTER (OUTPATIENT)
Facility: HOSPITAL | Age: 63
Discharge: HOME | End: 2018-10-12
Attending: EMERGENCY MEDICINE | Admitting: HOSPITALIST
Payer: MEDICARE

## 2018-10-03 ENCOUNTER — TELEPHONE (OUTPATIENT)
Dept: FAMILY MEDICINE | Facility: CLINIC | Age: 63
End: 2018-10-03

## 2018-10-03 ENCOUNTER — PATIENT OUTREACH (OUTPATIENT)
Dept: ADMINISTRATIVE | Facility: CLINIC | Age: 63
End: 2018-10-03

## 2018-10-03 ENCOUNTER — TELEPHONE (OUTPATIENT)
Dept: ADMINISTRATIVE | Facility: CLINIC | Age: 63
End: 2018-10-03

## 2018-10-03 DIAGNOSIS — S82.401A CLOSED FRACTURE OF RIGHT TIBIA AND FIBULA: Primary | ICD-10-CM

## 2018-10-03 DIAGNOSIS — S82.201A CLOSED FRACTURE OF RIGHT TIBIA AND FIBULA, INITIAL ENCOUNTER: ICD-10-CM

## 2018-10-03 DIAGNOSIS — S82.831A OTHER CLOSED FRACTURE OF PROXIMAL END OF RIGHT FIBULA, INITIAL ENCOUNTER: ICD-10-CM

## 2018-10-03 DIAGNOSIS — S89.91XA RIGHT KNEE INJURY, INITIAL ENCOUNTER: ICD-10-CM

## 2018-10-03 DIAGNOSIS — S82.191A OTHER CLOSED FRACTURE OF PROXIMAL END OF RIGHT TIBIA, INITIAL ENCOUNTER: ICD-10-CM

## 2018-10-03 DIAGNOSIS — Z01.810 PREOP CARDIOVASCULAR EXAM: ICD-10-CM

## 2018-10-03 DIAGNOSIS — S82.401A CLOSED FRACTURE OF RIGHT TIBIA AND FIBULA, INITIAL ENCOUNTER: ICD-10-CM

## 2018-10-03 DIAGNOSIS — S82.201A CLOSED FRACTURE OF RIGHT TIBIA AND FIBULA: Primary | ICD-10-CM

## 2018-10-03 PROCEDURE — 99285 EMERGENCY DEPT VISIT HI MDM: CPT | Mod: ,,, | Performed by: PHYSICIAN ASSISTANT

## 2018-10-03 PROCEDURE — 25000003 PHARM REV CODE 250: Performed by: PHYSICIAN ASSISTANT

## 2018-10-03 PROCEDURE — 25000003 PHARM REV CODE 250: Performed by: EMERGENCY MEDICINE

## 2018-10-03 PROCEDURE — 99285 EMERGENCY DEPT VISIT HI MDM: CPT | Mod: 25

## 2018-10-03 PROCEDURE — 96372 THER/PROPH/DIAG INJ SC/IM: CPT

## 2018-10-03 RX ORDER — OXYCODONE HYDROCHLORIDE 5 MG/1
5 TABLET ORAL
Status: COMPLETED | OUTPATIENT
Start: 2018-10-03 | End: 2018-10-03

## 2018-10-03 RX ORDER — MORPHINE SULFATE 15 MG/1
15 TABLET ORAL
Status: COMPLETED | OUTPATIENT
Start: 2018-10-03 | End: 2018-10-03

## 2018-10-03 RX ORDER — TRAMADOL HYDROCHLORIDE 50 MG/1
TABLET ORAL
Refills: 0 | Status: ON HOLD | COMMUNITY
Start: 2018-07-11 | End: 2018-10-05 | Stop reason: HOSPADM

## 2018-10-03 RX ADMIN — MORPHINE SULFATE 15 MG: 15 TABLET ORAL at 08:10

## 2018-10-03 RX ADMIN — OXYCODONE HYDROCHLORIDE 5 MG: 5 TABLET ORAL at 11:10

## 2018-10-03 NOTE — TELEPHONE ENCOUNTER
----- Message from Rut Westfall sent at 10/3/2018  1:40 PM CDT -----  Contact: Self [phone]  Patient is returning your call.  Please advise.

## 2018-10-03 NOTE — PROGRESS NOTES
"[facility] Post-Discharge call completed with patient today. C3 nurse noted the following concerns:  Patient reports she fell in the doorway of her home last night injuring her right knee.  Unable to verbalize what happened to cause fall and denies feeling dizzy or faint.  Complaints of right knee bruising, severe pain, states she cannot ambulate due to pain since fall last night.  Informed that her daughter was at home with her at the time of the fall, but unable to help her up off the floor necessitating a call to 911 for assistants.    States her plan is to wait for her daughter to return home from work and then  a 2nd daughter from the airport prior to seeking medical attention for her injured knee. She thinks this will be sometime after 4:00 p.m. however she does not know which ED or how she is going to get to the there as she cannot ambulate with assistance at this time.    [facility], reports physical therapist saw her today and examined her right knee.  He told me I should get it checked out.  ·  Advised patient C3 RN will route progress note outlining status to [facility] ED staff.    C3 RN phoned daughter Donald at patient's request. She confirmed patient has resumed insulin doses prescribed prior to recent hospital admission (Levemir 20 units BID & Humalog 16 units ac meals per pt.), blood glucose was over 200 mg/DL this morning.    · Daughter verbalized strong annoyance that medication dosages have been adjusted without consulting her mother.  Those were the doses her endocrinologist prescribed in [place].  And, she is going to continue those doses until we see him."    · Donald went on to say that she made sure her mother got breakfast before going to work, "but I can't tell you how much she ate.  That's not on me.   She confirmed her mother's fall upon inquiry, however provided no further details regarding time line to take her to the emergency room this " evening.  · Writer expressed concerns regarding significant safety hazards of resuming much higher doses of insulin given recent steroid taper, altered mental status, patients reported poor appetite/ fluid intake and fall at home with injury yesterday.  Patient starter was not receptive to education provided.  Attempted to provide RN on call/[facility] 1 800 number.  Daughter a few stating she was at work and busy.    Progress note routed to:   [facility] ED staff    Sudhakar Brasher MD.    Specialty:  Pulmonary Disease patients provider in [place].

## 2018-10-03 NOTE — PATIENT INSTRUCTIONS
Altered Level of Consciousness  Level of consciousness (LOC) is a measure of a persons ability to interact with other people and to react to the surroundings. A person with an altered level of consciousness may not respond to touch or voices. He or she may look vacant or blank and may not make eye contact with others. He or she may be limp and may not move for a long time or show little interest in moving. He or she may also be confused.  There are many causes of altered LOC. They include low blood sugar, infection, medicines, injuries, or other medical problems.  Altered LOC is a medical emergency. The healthcare provider will do tests to help find the cause. These may include blood tests and imaging tests. The person is treated so breathing and heart rate are stable. An intravenous (IV) line may be put into a vein in the arm or hand to give medicines. Once the cause of altered LOC is found, the goal is to treat the cause.  In almost all cases, the person will be admitted to the hospital for observation.  Home care  When your loved one is released from the hospital, you will be given guidelines for caring for him or her. In general:  · Follow the healthcare provider's instructions for giving any prescribed medicines to your child.  · Stay with your loved one or have another responsible adult look after him or her. Watch carefully for any return of symptoms or changes in behavior.  · If the person has diabetes, make sure that any approved medicines are given on time and as prescribed.  Follow-up care  Follow up with the healthcare provider or our staff.  When to seek medical advice  Call the healthcare provider right away for any of the following:  · Symptoms of altered LOC return  · New symptoms appear  Date Last Reviewed: 8/23/2015 © 2000-2017 [employer]. [address] [place] [address], [place] [address]. All rights reserved. This information is not intended as a substitute for professional medical  care. Always follow your healthcare professional's instructions.        Hypoglycemia (Low Blood Sugar)     Fast-acting sugar includes a cup of nonfat milk.     Too little sugar (glucose) in your blood is called hypoglycemia or low blood sugar. Low blood sugar usually means anything lower than 70 mg/dL. Talk with your healthcare provider about your target range and what level is too low for you. Diabetes itself doesnt cause low blood sugar. But some of the treatments for diabetes, such as pills or insulin, may raise your risk for it. Low blood sugar may cause you to pass out or have a seizure. So always treat low blood sugar right away, but don't overeat.  Special note: Always carry a source of fast-acting sugar and a snack in case of hypoglycemia.   What you may notice  If you have low blood sugar, you may have one or more of these symptoms:  · Shakiness or dizziness  · Cold, clammy skin or sweating  · Feelings of hunger  · Headache  · Nervousness  · A hard, fast heartbeat  · Weakness  · Confusion or irritability  · Blurred vision  · Having nightmares or waking up confused or sweating  · Numbness or tingling in the lips or tongue  What you should do  Here are tips to follow if you have hypoglycemia:   · First check your blood sugar. If it is too low (out of your target range), eat or drink 15 to 20 grams of fast-acting sugar. This may be 3 to 4 glucose tablets, 4 ounces (half a cup) of fruit juice or regular (nondiet) soda, 8 ounces (1 cup) of fat-free milk, or 1 tablespoon of honey. Dont take more than this, or your blood sugar may go too high.  · Wait 15 minutes. Then recheck your blood sugar if you can.  · If your blood sugar is still too low, repeat the steps above and check your blood sugar again. If your blood sugar still has not returned to your target range, contact your healthcare provider or seek emergency care.  · Once your blood sugar returns to target range, eat a snack or meal.  Preventing low blood  sugar  Things you can do include the following:   · If your condition needs a strict treatment plan, eat your meals and snacks at the same times each day. Dont skip meals!  · If your treatment plan lets you change when you eat and what you eat, learn how to change the time and dose of your rapid-acting insulin to match this.   · Ask your healthcare provider if it is safe for you to drink alcohol. Never drink on an empty stomach.  · Take your medicine at the prescribed times.  · Always carry a source of fast-acting sugar and a snack when youre away from home.  Other things to do  Additional tips include the following:  · Carry a medical ID card, a compact USB drive, or wear a medical alert bracelet or necklace. It should say that you have diabetes. It should also say what to do if you pass out or have a seizure.  · Make sure your family, friends, and coworkers know the signs of low blood sugar. Tell them what to do if your blood sugar falls very low and you cant treat yourself.  · Keep a glucagon emergency kit handy. Be sure your family, friends, and coworkers know how and when to use it. Check it regularly and replace the glucagon before it expires.  · Talk with your health care team about other things you can do to prevent low blood sugar.     If you have unexplained hypoglycemia or hypoglycemia several times, call your healthcare provider.   Date Last Reviewed: 5/1/2016  © 7231-7422 [employer]. [address] [place] [address], [place] [address]. All rights reserved. This information is not intended as a substitute for professional medical care. Always follow your healthcare professional's instructions.        Insulin Reaction (Low Blood Sugar)  You have been treated for an insulin reaction today. This occurs when insulin causes your blood sugar to go too low (hypoglycemia). It may happen if you take too much insulin. It can also occur from taking your usual amount of insulin but not getting enough food.  This can be due to vomiting or loss of appetite. Other causes of low blood sugar include heavy exercise, strong emotions, and alcohol use.  Your blood sugar level may also be affected by tobacco, caffeine, and certain medicines, including:  · Aspirin  · Haloperidol  · Propoxyphene  · Chlorpromazine  · Propranolol  · Disopyramide  · ACE inhibitors  · Fluoroquinolone antibiotics  If you suspect that caffeine may be affecting you, switch to caffeine-free drinks. If you smoke, try to quit. Quitting smoking is one of the most important things you can do to protect your health. If you are taking any of the listed medicines, talk to your healthcare provider about switching to another type.  A class of medicines called beta-blockers is used for high blood pressure, rapid heart rate, and other conditions. Beta-blockers may prevent the early symptoms of low blood sugar. If you are taking a beta-blocker, you might not realize that your blood sugar level is getting low. If you are taking a beta-blocker, talk to your healthcare provider about switching to a different class. The beta-blocker class includes:  · Propranolol  · Atenolol  · Metoprolol  · Nadolol  · Labetalol  · Carvedilol  Home care  · During the next 24 hours rest and eat frequent small meals. This will help prevent the return of low blood sugar.  · Learn the signals your body gives as your blood sugar drops (see below).  If symptoms of hypoglycemia return  · Keep a source of fast-acting sugar with you. At the first sign of low blood sugar, eat or drink 15 to 20 grams of fast-acting sugar. Examples include:  ¨ 3 to 4 glucose tablets (found at most drugstores)  ¨ 4 ounces of regular soda  ¨ 4 ounces of fruit juice  ¨ 2 tablespoons of raisins  ¨ 1 tablespoon of honey  · Check your blood sugar 15 minutes after treating yourself. If it is still low, take another 15 to 20 grams of fast-acting sugar. Test again in 15 minutes. If its still low, go to an emergency  room.  · Once blood sugar returns to normal, eat a snack or meal to keep your blood sugar in a safe range.     In the future, if you are not able to eat your normal amount at each meal due to illness or vomiting, you may need to reduce your insulin dose. Contact your healthcare provider as soon as possible to ask for a plan for a short-term adjustment of your dose if this happens again.     Check your blood sugar every 4 to 6 hours. Do this until you can begin eating normal amounts again.     Wear a medical alert bracelet or necklace, or carry a card in your wallet or a thumb drive explaining that you have diabetes. If you have a severe hypoglycemic reaction and can't give this information, it will help medical personnel provide proper care.  Follow-up care  Follow up with your healthcare provider, or as advised.  Check and write down your blood sugar and insulin dose twice a day--before breakfast and before dinner. Do this for the next 5 days. See your healthcare provider during the next week to review these records. This will help determine if you need to adjust your insulin dose.  If you have frequent or severe episodes of low blood sugar, your healthcare provider may recommend glucagon injection, which raises your blood sugar. One of your family members or friends would need to learn how to give you this injection.  For more information about diabetes, contact the American Diabetes Association, at www.diabetes.org.  When to seek medical advice  Call your healthcare provider right away if any of these symptoms of low blood sugar occur.  · Fatigue  · Headache  · Shakes  · Excess sweating  · Hunger  · Feeling anxious or restless  · Vision changes  · Drowsiness  · Weakness  · Confusion  · Personality changes  · Seizure or loss of consciousness  Date Last Reviewed: 6/1/2016 © 2000-2017 [employer]. [address] [place] [address], [place] [address]. All rights reserved. This information is not intended as a  substitute for professional medical care. Always follow your healthcare professional's instructions.
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
## 2018-10-04 PROBLEM — D64.9 ANEMIA: Status: ACTIVE | Noted: 2018-10-04

## 2018-10-04 PROBLEM — J44.9 COPD (CHRONIC OBSTRUCTIVE PULMONARY DISEASE): Status: ACTIVE | Noted: 2018-10-04

## 2018-10-04 PROBLEM — Z86.73 HISTORY OF CVA (CEREBROVASCULAR ACCIDENT): Status: ACTIVE | Noted: 2018-10-04

## 2018-10-04 PROBLEM — S82.101A CLOSED FRACTURE OF RIGHT PROXIMAL TIBIA: Status: ACTIVE | Noted: 2018-10-04

## 2018-10-04 LAB
ABO + RH BLD: NORMAL
ANION GAP SERPL CALC-SCNC: 8 MMOL/L
APTT BLDCRRT: <21 SEC
BASOPHILS # BLD AUTO: 0.01 K/UL
BASOPHILS NFR BLD: 0.1 %
BLD GP AB SCN CELLS X3 SERPL QL: NORMAL
BUN SERPL-MCNC: 14 MG/DL
CALCIUM SERPL-MCNC: 9.5 MG/DL
CHLORIDE SERPL-SCNC: 104 MMOL/L
CO2 SERPL-SCNC: 28 MMOL/L
CREAT SERPL-MCNC: 0.8 MG/DL
DIFFERENTIAL METHOD: ABNORMAL
EOSINOPHIL # BLD AUTO: 0.1 K/UL
EOSINOPHIL NFR BLD: 1.4 %
ERYTHROCYTE [DISTWIDTH] IN BLOOD BY AUTOMATED COUNT: 19.1 %
EST. GFR  (AFRICAN AMERICAN): >60 ML/MIN/1.73 M^2
EST. GFR  (NON AFRICAN AMERICAN): >60 ML/MIN/1.73 M^2
ESTIMATED AVG GLUCOSE: 157 MG/DL
ESTIMATED AVG GLUCOSE: 157 MG/DL
GLUCOSE SERPL-MCNC: 57 MG/DL
HBA1C MFR BLD HPLC: 7.1 %
HBA1C MFR BLD HPLC: 7.1 %
HCT VFR BLD AUTO: 29.4 %
HGB BLD-MCNC: 8.9 G/DL
IMM GRANULOCYTES # BLD AUTO: 0.08 K/UL
IMM GRANULOCYTES NFR BLD AUTO: 0.9 %
INR PPP: 0.9
LYMPHOCYTES # BLD AUTO: 2.5 K/UL
LYMPHOCYTES NFR BLD: 28.1 %
MCH RBC QN AUTO: 26.3 PG
MCHC RBC AUTO-ENTMCNC: 30.3 G/DL
MCV RBC AUTO: 87 FL
MONOCYTES # BLD AUTO: 0.7 K/UL
MONOCYTES NFR BLD: 8.2 %
NEUTROPHILS # BLD AUTO: 5.4 K/UL
NEUTROPHILS NFR BLD: 61.3 %
NRBC BLD-RTO: 0 /100 WBC
PLATELET # BLD AUTO: 180 K/UL
PMV BLD AUTO: 10.1 FL
POCT GLUCOSE: 168 MG/DL (ref 70–110)
POCT GLUCOSE: 182 MG/DL (ref 70–110)
POCT GLUCOSE: 55 MG/DL (ref 70–110)
POCT GLUCOSE: 83 MG/DL (ref 70–110)
POCT GLUCOSE: 93 MG/DL (ref 70–110)
POTASSIUM SERPL-SCNC: 3.6 MMOL/L
PREALB SERPL-MCNC: 25 MG/DL
PROCALCITONIN SERPL IA-MCNC: 0.44 NG/ML
PROTHROMBIN TIME: 9.7 SEC
RBC # BLD AUTO: 3.38 M/UL
SODIUM SERPL-SCNC: 140 MMOL/L
TRANSFERRIN SERPL-MCNC: 119 MG/DL
TRANSFERRIN SERPL-MCNC: 126 MG/DL
WBC # BLD AUTO: 8.85 K/UL

## 2018-10-04 PROCEDURE — 86901 BLOOD TYPING SEROLOGIC RH(D): CPT

## 2018-10-04 PROCEDURE — 25000242 PHARM REV CODE 250 ALT 637 W/ HCPCS: Performed by: NURSE PRACTITIONER

## 2018-10-04 PROCEDURE — G0378 HOSPITAL OBSERVATION PER HR: HCPCS

## 2018-10-04 PROCEDURE — 85730 THROMBOPLASTIN TIME PARTIAL: CPT

## 2018-10-04 PROCEDURE — 97530 THERAPEUTIC ACTIVITIES: CPT

## 2018-10-04 PROCEDURE — G8987 SELF CARE CURRENT STATUS: HCPCS | Mod: CK

## 2018-10-04 PROCEDURE — 83036 HEMOGLOBIN GLYCOSYLATED A1C: CPT

## 2018-10-04 PROCEDURE — G8979 MOBILITY GOAL STATUS: HCPCS | Mod: CK

## 2018-10-04 PROCEDURE — 97165 OT EVAL LOW COMPLEX 30 MIN: CPT

## 2018-10-04 PROCEDURE — 97162 PT EVAL MOD COMPLEX 30 MIN: CPT

## 2018-10-04 PROCEDURE — 99220 PR INITIAL OBSERVATION CARE,LEVL III: CPT | Mod: ,,, | Performed by: NURSE PRACTITIONER

## 2018-10-04 PROCEDURE — 84134 ASSAY OF PREALBUMIN: CPT

## 2018-10-04 PROCEDURE — 93010 ELECTROCARDIOGRAM REPORT: CPT | Mod: ,,, | Performed by: INTERNAL MEDICINE

## 2018-10-04 PROCEDURE — 63600175 PHARM REV CODE 636 W HCPCS: Performed by: STUDENT IN AN ORGANIZED HEALTH CARE EDUCATION/TRAINING PROGRAM

## 2018-10-04 PROCEDURE — 85610 PROTHROMBIN TIME: CPT

## 2018-10-04 PROCEDURE — 84145 PROCALCITONIN (PCT): CPT

## 2018-10-04 PROCEDURE — 85025 COMPLETE CBC W/AUTO DIFF WBC: CPT

## 2018-10-04 PROCEDURE — G8978 MOBILITY CURRENT STATUS: HCPCS | Mod: CM

## 2018-10-04 PROCEDURE — 84466 ASSAY OF TRANSFERRIN: CPT

## 2018-10-04 PROCEDURE — G8988 SELF CARE GOAL STATUS: HCPCS | Mod: CL

## 2018-10-04 PROCEDURE — 80048 BASIC METABOLIC PNL TOTAL CA: CPT

## 2018-10-04 PROCEDURE — 25000003 PHARM REV CODE 250: Performed by: NURSE PRACTITIONER

## 2018-10-04 PROCEDURE — 63600175 PHARM REV CODE 636 W HCPCS: Performed by: PHYSICIAN ASSISTANT

## 2018-10-04 PROCEDURE — 63600175 PHARM REV CODE 636 W HCPCS: Performed by: NURSE PRACTITIONER

## 2018-10-04 RX ORDER — ONDANSETRON 2 MG/ML
4 INJECTION INTRAMUSCULAR; INTRAVENOUS EVERY 8 HOURS PRN
Status: DISCONTINUED | OUTPATIENT
Start: 2018-10-04 | End: 2018-10-12 | Stop reason: HOSPADM

## 2018-10-04 RX ORDER — PREDNISONE 10 MG/1
10 TABLET ORAL DAILY
Status: DISCONTINUED | OUTPATIENT
Start: 2018-10-04 | End: 2018-10-12 | Stop reason: HOSPADM

## 2018-10-04 RX ORDER — LOSARTAN POTASSIUM 50 MG/1
100 TABLET ORAL DAILY
Status: DISCONTINUED | OUTPATIENT
Start: 2018-10-04 | End: 2018-10-12 | Stop reason: HOSPADM

## 2018-10-04 RX ORDER — MEGESTROL ACETATE 40 MG/1
40 TABLET ORAL 2 TIMES DAILY
Status: DISCONTINUED | OUTPATIENT
Start: 2018-10-04 | End: 2018-10-12 | Stop reason: HOSPADM

## 2018-10-04 RX ORDER — IPRATROPIUM BROMIDE AND ALBUTEROL SULFATE 2.5; .5 MG/3ML; MG/3ML
3 SOLUTION RESPIRATORY (INHALATION) EVERY 4 HOURS PRN
Status: DISCONTINUED | OUTPATIENT
Start: 2018-10-04 | End: 2018-10-12 | Stop reason: HOSPADM

## 2018-10-04 RX ORDER — MORPHINE SULFATE 2 MG/ML
6 INJECTION, SOLUTION INTRAMUSCULAR; INTRAVENOUS
Status: COMPLETED | OUTPATIENT
Start: 2018-10-04 | End: 2018-10-04

## 2018-10-04 RX ORDER — KETOROLAC TROMETHAMINE 30 MG/ML
15 INJECTION, SOLUTION INTRAMUSCULAR; INTRAVENOUS EVERY 6 HOURS PRN
Status: DISCONTINUED | OUTPATIENT
Start: 2018-10-04 | End: 2018-10-06

## 2018-10-04 RX ORDER — CALCIUM CARBONATE 200(500)MG
500 TABLET,CHEWABLE ORAL 2 TIMES DAILY PRN
Status: DISCONTINUED | OUTPATIENT
Start: 2018-10-04 | End: 2018-10-12 | Stop reason: HOSPADM

## 2018-10-04 RX ORDER — ENOXAPARIN SODIUM 100 MG/ML
40 INJECTION SUBCUTANEOUS EVERY 24 HOURS
Status: DISCONTINUED | OUTPATIENT
Start: 2018-10-04 | End: 2018-10-12 | Stop reason: HOSPADM

## 2018-10-04 RX ORDER — DIPHENHYDRAMINE HYDROCHLORIDE 50 MG/ML
12.5 INJECTION INTRAMUSCULAR; INTRAVENOUS EVERY 4 HOURS PRN
Status: DISCONTINUED | OUTPATIENT
Start: 2018-10-04 | End: 2018-10-12 | Stop reason: HOSPADM

## 2018-10-04 RX ORDER — GLUCAGON 1 MG
1 KIT INJECTION
Status: DISCONTINUED | OUTPATIENT
Start: 2018-10-04 | End: 2018-10-05

## 2018-10-04 RX ORDER — FLUTICASONE FUROATE AND VILANTEROL 100; 25 UG/1; UG/1
1 POWDER RESPIRATORY (INHALATION) DAILY
Status: DISCONTINUED | OUTPATIENT
Start: 2018-10-04 | End: 2018-10-12 | Stop reason: HOSPADM

## 2018-10-04 RX ORDER — ACETAMINOPHEN 325 MG/1
650 TABLET ORAL EVERY 6 HOURS PRN
Status: DISCONTINUED | OUTPATIENT
Start: 2018-10-04 | End: 2018-10-12

## 2018-10-04 RX ORDER — ONDANSETRON 8 MG/1
8 TABLET, ORALLY DISINTEGRATING ORAL EVERY 8 HOURS PRN
Status: DISCONTINUED | OUTPATIENT
Start: 2018-10-04 | End: 2018-10-12 | Stop reason: HOSPADM

## 2018-10-04 RX ORDER — IBUPROFEN 200 MG
24 TABLET ORAL
Status: DISCONTINUED | OUTPATIENT
Start: 2018-10-04 | End: 2018-10-05

## 2018-10-04 RX ORDER — FUROSEMIDE 40 MG/1
40 TABLET ORAL DAILY
Status: DISCONTINUED | OUTPATIENT
Start: 2018-10-04 | End: 2018-10-12 | Stop reason: HOSPADM

## 2018-10-04 RX ORDER — SIMETHICONE 80 MG
1 TABLET,CHEWABLE ORAL 4 TIMES DAILY PRN
Status: DISCONTINUED | OUTPATIENT
Start: 2018-10-04 | End: 2018-10-12 | Stop reason: HOSPADM

## 2018-10-04 RX ORDER — FERROUS SULFATE 325(65) MG
325 TABLET, DELAYED RELEASE (ENTERIC COATED) ORAL DAILY
Status: DISCONTINUED | OUTPATIENT
Start: 2018-10-04 | End: 2018-10-12 | Stop reason: HOSPADM

## 2018-10-04 RX ORDER — DULOXETIN HYDROCHLORIDE 30 MG/1
60 CAPSULE, DELAYED RELEASE ORAL DAILY
Status: DISCONTINUED | OUTPATIENT
Start: 2018-10-04 | End: 2018-10-12 | Stop reason: HOSPADM

## 2018-10-04 RX ORDER — AMOXICILLIN 250 MG
1 CAPSULE ORAL 2 TIMES DAILY
Status: DISCONTINUED | OUTPATIENT
Start: 2018-10-04 | End: 2018-10-12 | Stop reason: HOSPADM

## 2018-10-04 RX ORDER — SODIUM CHLORIDE 0.9 % (FLUSH) 0.9 %
5 SYRINGE (ML) INJECTION
Status: DISCONTINUED | OUTPATIENT
Start: 2018-10-04 | End: 2018-10-12 | Stop reason: HOSPADM

## 2018-10-04 RX ORDER — SIMVASTATIN 20 MG/1
20 TABLET, FILM COATED ORAL NIGHTLY
Status: DISCONTINUED | OUTPATIENT
Start: 2018-10-04 | End: 2018-10-12 | Stop reason: HOSPADM

## 2018-10-04 RX ORDER — OXYCODONE HYDROCHLORIDE 5 MG/1
10 TABLET ORAL EVERY 4 HOURS PRN
Status: DISCONTINUED | OUTPATIENT
Start: 2018-10-04 | End: 2018-10-04

## 2018-10-04 RX ORDER — HYDRALAZINE HYDROCHLORIDE 25 MG/1
25 TABLET, FILM COATED ORAL EVERY 8 HOURS
Status: DISCONTINUED | OUTPATIENT
Start: 2018-10-04 | End: 2018-10-12 | Stop reason: HOSPADM

## 2018-10-04 RX ORDER — DILTIAZEM HYDROCHLORIDE 240 MG/1
240 CAPSULE, COATED, EXTENDED RELEASE ORAL DAILY
Status: DISCONTINUED | OUTPATIENT
Start: 2018-10-04 | End: 2018-10-12 | Stop reason: HOSPADM

## 2018-10-04 RX ORDER — ASPIRIN 81 MG/1
81 TABLET ORAL DAILY
Status: DISCONTINUED | OUTPATIENT
Start: 2018-10-04 | End: 2018-10-12 | Stop reason: HOSPADM

## 2018-10-04 RX ORDER — OXYCODONE HYDROCHLORIDE 5 MG/1
5 TABLET ORAL EVERY 6 HOURS PRN
Status: DISCONTINUED | OUTPATIENT
Start: 2018-10-04 | End: 2018-10-05

## 2018-10-04 RX ORDER — PANTOPRAZOLE SODIUM 40 MG/1
40 TABLET, DELAYED RELEASE ORAL DAILY
Status: DISCONTINUED | OUTPATIENT
Start: 2018-10-04 | End: 2018-10-12 | Stop reason: HOSPADM

## 2018-10-04 RX ORDER — RAMELTEON 8 MG/1
8 TABLET ORAL NIGHTLY PRN
Status: DISCONTINUED | OUTPATIENT
Start: 2018-10-04 | End: 2018-10-12 | Stop reason: HOSPADM

## 2018-10-04 RX ORDER — IBUPROFEN 200 MG
16 TABLET ORAL
Status: DISCONTINUED | OUTPATIENT
Start: 2018-10-04 | End: 2018-10-05

## 2018-10-04 RX ORDER — CLOPIDOGREL BISULFATE 75 MG/1
75 TABLET ORAL DAILY
Status: DISCONTINUED | OUTPATIENT
Start: 2018-10-04 | End: 2018-10-12 | Stop reason: HOSPADM

## 2018-10-04 RX ORDER — INSULIN ASPART 100 [IU]/ML
0-5 INJECTION, SOLUTION INTRAVENOUS; SUBCUTANEOUS
Status: DISCONTINUED | OUTPATIENT
Start: 2018-10-04 | End: 2018-10-05

## 2018-10-04 RX ADMIN — ENOXAPARIN SODIUM 40 MG: 100 INJECTION SUBCUTANEOUS at 04:10

## 2018-10-04 RX ADMIN — ASPIRIN 81 MG: 81 TABLET, COATED ORAL at 08:10

## 2018-10-04 RX ADMIN — CLONIDINE HYDROCHLORIDE 0.3 MG: 0.2 TABLET ORAL at 09:10

## 2018-10-04 RX ADMIN — CLONIDINE HYDROCHLORIDE 0.3 MG: 0.2 TABLET ORAL at 08:10

## 2018-10-04 RX ADMIN — DILTIAZEM HYDROCHLORIDE 240 MG: 240 CAPSULE, COATED, EXTENDED RELEASE ORAL at 08:10

## 2018-10-04 RX ADMIN — FERROUS SULFATE TAB EC 325 MG (65 MG FE EQUIVALENT) 325 MG: 325 (65 FE) TABLET DELAYED RESPONSE at 08:10

## 2018-10-04 RX ADMIN — HYDRALAZINE HYDROCHLORIDE 25 MG: 25 TABLET ORAL at 01:10

## 2018-10-04 RX ADMIN — DULOXETINE HYDROCHLORIDE 60 MG: 30 CAPSULE, DELAYED RELEASE ORAL at 08:10

## 2018-10-04 RX ADMIN — FUROSEMIDE 40 MG: 40 TABLET ORAL at 08:10

## 2018-10-04 RX ADMIN — MEGESTROL ACETATE 40 MG: 40 TABLET ORAL at 10:10

## 2018-10-04 RX ADMIN — PREDNISONE 10 MG: 10 TABLET ORAL at 08:10

## 2018-10-04 RX ADMIN — LOSARTAN POTASSIUM 100 MG: 50 TABLET ORAL at 08:10

## 2018-10-04 RX ADMIN — DIPHENHYDRAMINE HYDROCHLORIDE 12.5 MG: 50 INJECTION, SOLUTION INTRAMUSCULAR; INTRAVENOUS at 08:10

## 2018-10-04 RX ADMIN — SIMVASTATIN 20 MG: 20 TABLET, FILM COATED ORAL at 09:10

## 2018-10-04 RX ADMIN — SENNOSIDES AND DOCUSATE SODIUM 1 TABLET: 8.6; 5 TABLET ORAL at 08:10

## 2018-10-04 RX ADMIN — HYDRALAZINE HYDROCHLORIDE 25 MG: 25 TABLET ORAL at 05:10

## 2018-10-04 RX ADMIN — Medication 6 MG: at 02:10

## 2018-10-04 RX ADMIN — KETOROLAC TROMETHAMINE 15 MG: 30 INJECTION, SOLUTION INTRAMUSCULAR at 07:10

## 2018-10-04 RX ADMIN — SENNOSIDES AND DOCUSATE SODIUM 1 TABLET: 8.6; 5 TABLET ORAL at 09:10

## 2018-10-04 RX ADMIN — SIMVASTATIN 20 MG: 20 TABLET, FILM COATED ORAL at 04:10

## 2018-10-04 RX ADMIN — Medication 16 G: at 08:10

## 2018-10-04 RX ADMIN — HYDRALAZINE HYDROCHLORIDE 25 MG: 25 TABLET ORAL at 09:10

## 2018-10-04 RX ADMIN — KETOROLAC TROMETHAMINE 15 MG: 30 INJECTION, SOLUTION INTRAMUSCULAR at 08:10

## 2018-10-04 RX ADMIN — MEGESTROL ACETATE 40 MG: 40 TABLET ORAL at 09:10

## 2018-10-04 RX ADMIN — CLOPIDOGREL 75 MG: 75 TABLET, FILM COATED ORAL at 08:10

## 2018-10-04 RX ADMIN — PANTOPRAZOLE SODIUM 40 MG: 40 TABLET, DELAYED RELEASE ORAL at 08:10

## 2018-10-04 RX ADMIN — FLUTICASONE FUROATE AND VILANTEROL TRIFENATATE 1 PUFF: 100; 25 POWDER RESPIRATORY (INHALATION) at 10:10

## 2018-10-04 NOTE — SUBJECTIVE & OBJECTIVE
Interval History:  NAEO, pt states she feels well other than leg. hyperglycemic today to 571 on SSI she was admitted on. Bolus with 24U detemir and 7U aspart repeat   90 minutes later. Given additional 10U aspart. Started on regular basal/prandial insulin 20U detemir + 6U aspart ACHS dosing + low dose SSI and will adjust as needed.     Past Medical History:   Diagnosis Date    Asthma     Closed compression fracture of fourth lumbar vertebra     COPD (chronic obstructive pulmonary disease)     Coronary artery disease     Diabetes mellitus     Glaucoma     High cholesterol     Hypertension     Iritis     Pulmonary embolus     Stroke     rt sided weakness.       Past Surgical History:   Procedure Laterality Date    ABDOMINAL SURGERY      APPENDECTOMY N/A 8/26/2018    Procedure: APPENDECTOMY;  Surgeon: Bernadine Melendrez MD;  Location: [facility] OR;  Service: General;  Laterality: N/A;    APPENDECTOMY N/A 8/26/2018    Performed by Bernadine Melendrez MD at [facility] OR    APPENDECTOMY, LAPAROSCOPIC---CONVERTED TO OPEN APPENDECTOMY @0950 N/A 8/26/2018    Performed by Bernadine Melendrez MD at [facility] OR    BACK SURGERY      stimulator    CATARACT EXTRACTION      HYSTERECTOMY      LAPAROSCOPIC APPENDECTOMY N/A 8/26/2018    Procedure: APPENDECTOMY, LAPAROSCOPIC---CONVERTED TO OPEN APPENDECTOMY @0950;  Surgeon: Bernadine Melendrez MD;  Location: [facility] OR;  Service: General;  Laterality: N/A;       Review of patient's allergies indicates:   Allergen Reactions    Ace inhibitors Swelling    Corticosteroids (glucocorticoids)     Hydralazine analogues     Tetracyclines Swelling    Travatan (with benzalkonium) [travoprost (benzalkonium)]        No current facility-administered medications on file prior to encounter.      Current Outpatient Medications on File Prior to Encounter   Medication Sig    [DISCONTINUED] traMADol (ULTRAM) 50 mg tablet TK 1 T PO QD PRN    acetaminophen (TYLENOL) 325 MG tablet Take 2  tablets (650 mg total) by mouth every 6 (six) hours as needed for Pain.    albuterol-ipratropium (DUO-NEB) 2.5 mg-0.5 mg/3 mL nebulizer solution Take 3 mLs by nebulization every 4 (four) hours. Rescue    aspirin (ECOTRIN) 81 MG EC tablet Take 1 tablet (81 mg total) by mouth once daily.    cloNIDine (CATAPRES) 0.3 MG tablet Take 1 tablet (0.3 mg total) by mouth 3 (three) times daily.    clopidogrel (PLAVIX) 75 mg tablet Take 75 mg by mouth once daily.    diltiaZEM (CARDIZEM CD) 240 MG 24 hr capsule Take 1 capsule (240 mg total) by mouth once daily.    DULoxetine (CYMBALTA) 60 MG capsule Take 60 mg by mouth once daily.    fluticasone-vilanterol (BREO) 100-25 mcg/dose diskus inhaler Inhale 1 puff into the lungs once daily. Controller    furosemide (LASIX) 40 MG tablet Take 40 mg by mouth once daily.     hydrALAZINE (APRESOLINE) 25 MG tablet Take 1 tablet (25 mg total) by mouth every 8 (eight) hours.    insulin detemir U-100 (LEVEMIR FLEXTOUCH) 100 unit/mL (3 mL) SubQ InPn pen Inject 24 Units into the skin once daily.    insulin lispro (HUMALOG KWIKPEN) 100 unit/mL InPn pen Inject 3 Units into the skin 3 (three) times daily with meals.    Lactobacillus acidophilus 1 billion cell Cap Take 1 tablet by mouth 2 (two) times daily.    losartan (COZAAR) 100 MG tablet Take 100 mg by mouth once daily.     megestrol (MEGACE) 40 MG Tab Take 40 mg by mouth 2 (two) times daily.    ondansetron (ZOFRAN) 4 MG tablet Take 1 tablet (4 mg total) by mouth every 6 (six) hours as needed.    pantoprazole (PROTONIX) 40 MG tablet Take 40 mg by mouth once daily.    predniSONE (DELTASONE) 10 MG tablet Take 10 mg by mouth once daily.     pyridoxine, vitamin B6, (VITAMIN B-6) 50 MG Tab Take 1 tablet (50 mg total) by mouth once daily.    senna-docusate 8.6-50 mg (SENNA WITH DOCUSATE SODIUM) 8.6-50 mg per tablet Take 1 tablet by mouth 2 (two) times daily.    simethicone 80 mg Tab Take 160 mg by mouth every 6 (six) hours as needed  for Flatulence.    simvastatin (ZOCOR) 40 MG tablet Take 0.5 tablets (20 mg total) by mouth every evening.     Family History     Problem Relation (Age of Onset)    Cancer Father    Diabetes Brother    Lupus Sister    Multiple sclerosis Mother        Tobacco Use    Smoking status: Never Smoker   Substance and Sexual Activity    Alcohol use: No     Frequency: Never    Drug use: No    Sexual activity: No     Partners: Male     Review of Systems   Constitutional: Negative for chills and fever.   HENT: Negative for congestion and sore throat.    Eyes: Negative for pain and visual disturbance.   Respiratory: Negative for cough, chest tightness and shortness of breath.    Cardiovascular: Negative for chest pain and palpitations.   Gastrointestinal: Negative for abdominal pain, diarrhea, nausea and vomiting.   Genitourinary: Negative for dysuria and frequency.   Musculoskeletal: Positive for arthralgias, back pain and myalgias.   Skin: Negative for color change and rash.   Neurological: Negative for seizures and syncope.   Hematological: Bruises/bleeds easily.     Objective:     Vital Signs (Most Recent):  Temp: 98.4 °F (36.9 °C) (10/05/18 1109)  Pulse: 78 (10/05/18 1526)  Resp: (!) 22 (10/05/18 1128)  BP: 137/63 (10/05/18 1109)  SpO2: 96 % (10/05/18 1224) Vital Signs (24h Range):  Temp:  [97.3 °F (36.3 °C)-99.3 °F (37.4 °C)] 98.4 °F (36.9 °C)  Pulse:  [74-92] 78  Resp:  [18-22] 22  SpO2:  [96 %-100 %] 96 %  BP: (110-142)/(58-68) 137/63     Weight: 74.3 kg (163 lb 12.8 oz)  Body mass index is 31.99 kg/m².    Physical Exam   Constitutional: She is oriented to person, place, and time. She appears well-developed and well-nourished. No distress.   HENT:   Head: Normocephalic and atraumatic.   Eyes: EOM are normal. Pupils are equal, round, and reactive to light.   Neck: Normal range of motion. Neck supple.   Cardiovascular: Normal rate, regular rhythm, normal heart sounds and intact distal pulses.   Pulmonary/Chest: Effort  normal and breath sounds normal. No respiratory distress.   Abdominal: Soft. Bowel sounds are normal. She exhibits no distension. There is no tenderness.   Musculoskeletal: Normal range of motion. She exhibits edema.   Large bulky splint to RLE - sensory / motor intact    Neurological: She is alert and oriented to person, place, and time.   Skin: Skin is warm and dry. She is not diaphoretic.   Multiple areas of ecchymosis    Psychiatric: She has a normal mood and affect. Her behavior is normal.   Nursing note and vitals reviewed.        CRANIAL NERVES     CN III, IV, VI   Pupils are equal, round, and reactive to light.  Extraocular motions are normal.        Significant Labs:   CBC:   Recent Labs   Lab  10/04/18   0302  10/05/18   0443   WBC  8.85  7.56   HGB  8.9*  8.9*   HCT  29.4*  29.2*   PLT  180  207     CMP:   Recent Labs   Lab  10/04/18   0302  10/05/18   1258   NA  140  134*   K  3.6  5.3*   CL  104  101   CO2  28  26   GLU  57*  571*   BUN  14  22   CREATININE  0.8  1.2   CALCIUM  9.5  9.2   ANIONGAP  8  7*   EGFRNONAA  >60.0  48.2*       Significant Imaging: I have reviewed all pertinent imaging results/findings within the past 24 hours.     Medical record, imaging, EKG, and medications reviewed     Review of Systems   Constitutional: Negative for chills, diaphoresis and fever.   Eyes: Negative for photophobia and visual disturbance.   Respiratory: Negative for cough, shortness of breath and wheezing.    Cardiovascular: Negative for palpitations and leg swelling.   Gastrointestinal: Negative for abdominal pain, constipation, diarrhea, nausea and vomiting.   Genitourinary: Negative for dysuria and flank pain.   Musculoskeletal: Positive for gait problem, joint swelling and myalgias. Negative for arthralgias.   Skin: Negative for rash and wound (open appy scar LLQ healing well).   Neurological: Positive for weakness. Negative for syncope and headaches.   Psychiatric/Behavioral: Negative for agitation,  confusion and decreased concentration. The patient is not nervous/anxious.      Objective:     Vital Signs (Most Recent):  Temp: 97.7 °F (36.5 °C) (10/04/18 1605)  Pulse: 76 (10/04/18 1605)  Resp: (!) 22 (10/04/18 1605)  BP: (!) 110/58 (10/04/18 1605)  SpO2: 99 % (10/04/18 1605) Vital Signs (24h Range):  Temp:  [97.7 °F (36.5 °C)-98.7 °F (37.1 °C)] 97.7 °F (36.5 °C)  Pulse:  [] 76  Resp:  [16-22] 22  SpO2:  [96 %-99 %] 99 %  BP: (110-181)/(58-87) 110/58     Weight: 74.3 kg (163 lb 12.8 oz)  Body mass index is 31.99 kg/m².  No intake or output data in the 24 hours ending 10/04/18 1653   Physical Exam   Constitutional: She is oriented to person, place, and time. She appears well-developed and well-nourished. No distress.   AAOx3 but distractible, poor concentration, somnolent   HENT:   Head: Normocephalic and atraumatic.   Eyes: Conjunctivae are normal. No scleral icterus.   Neck: Neck supple. No JVD present. No tracheal deviation present.   Cardiovascular: Normal rate, regular rhythm, normal heart sounds and intact distal pulses. Exam reveals no gallop and no friction rub.   Pulmonary/Chest: Effort normal and breath sounds normal. No stridor. No respiratory distress. She has no wheezes. She has no rales. She exhibits no tenderness.   Abdominal: Soft. Bowel sounds are normal. She exhibits no mass. There is no tenderness. There is no rebound and no guarding.   Musculoskeletal: She exhibits tenderness. She exhibits no edema or deformity.   R lower leg splint in place, leg elevated on pillow.   Neurological: She is alert and oriented to person, place, and time. No cranial nerve deficit or sensory deficit. Coordination normal.   Skin: Skin is warm and dry. She is not diaphoretic.   Psychiatric: She has a normal mood and affect. Her behavior is normal.   Vitals reviewed.      Significant Labs:   CBC:   Recent Labs   Lab  10/04/18   0302   WBC  8.85   HGB  8.9*   HCT  29.4*   PLT  180     CMP:   Recent Labs   Lab   10/04/18   0302   NA  140   K  3.6   CL  104   CO2  28   GLU  57*   BUN  14   CREATININE  0.8   CALCIUM  9.5   ANIONGAP  8   EGFRNONAA  >60.0     Recent Lab Results       10/04/18  1521 10/04/18  1113 10/04/18  0846 10/04/18  0753 10/04/18  0302      Immature Granulocytes     0.9     Immature Grans (Abs)     0.08  Comment:  Mild elevation in immature granulocytes is non specific and   can be seen in a variety of conditions including stress response,   acute inflammation, trauma and pregnancy. Correlation with other   laboratory and clinical findings is essential.       Procalcitonin     0.44  Comment:  Please re-baseline procalcitonin if a patient is transferred from  other facilities to [facility].  A concentration < 0.25 ng/mL represents a low risk bacterial   infection.  Procalcitonin may not be accurate among patients with localized   infection, recent trauma or major surgery, immunosuppressed state,   invasive fungal infection, renal dysfunction. Decisions regarding   initiation or continuation of antibiotic therapy should not be based   solely on procalcitonin levels.       Anion Gap     8     aPTT     <21.0  Comment:  aPTT therapeutic range = 39-69 seconds     Baso #     0.01     Basophil%     0.1     BUN, Bld     14     Calcium     9.5     Chloride     104     CO2     28     Creatinine     0.8     Differential Method     Automated     eGFR if      >60.0     eGFR if non      >60.0  Comment:  Calculation used to obtain the estimated glomerular filtration  rate (eGFR) is the CKD-EPI equation.        Eos #     0.1     Eosinophil%     1.4     Estimated Avg Glucose     157     Glucose     57     Gran # (ANC)     5.4     Gran%     61.3     Group & Rh     A POS     Hematocrit     29.4     Hemoglobin     8.9     Hemoglobin A1C     7.1  Comment:  ADA Screening Guidelines:  5.7-6.4%  Consistent with prediabetes  >or=6.5%  Consistent with diabetes  High levels of fetal hemoglobin  interfere with the HbA1C  assay. Heterozygous hemoglobin variants (HbS, HgC, etc)do  not significantly interfere with this assay.   However, presence of multiple variants may affect accuracy.       INDIRECT PIERRE     NEG     Coumadin Monitoring INR     0.9  Comment:  Coumadin Therapy:  2.0 - 3.0 for INR for all indicators except mechanical heart valves  and antiphospholipid syndromes which should use 2.5 - 3.5.       Lymph #     2.5     Lymph%     28.1     MCH     26.3     MCHC     30.3     MCV     87     Mono #     0.7     Mono%     8.2     MPV     10.1     nRBC     0     Platelets     180     POCT Glucose 182 93 83 55      Potassium     3.6     Prealbumin     25     Protime     9.7     RBC     3.38     RDW     19.1     Sodium     140     Transferrin     126     WBC     8.85                     Significant Imaging: I have reviewed all pertinent imaging results/findings within the past 24 hours.

## 2018-10-04 NOTE — PLAN OF CARE
[facility]    HOME HEALTH ORDERS  FACE TO FACE ENCOUNTER    Patient Name: Sheryl Martines  YOB: 1955    PCP: Primary Doctor No   PCP Address: None  PCP Phone Number: None  PCP Fax: None    Encounter Date: 10/04/2018    Admit to Home Health    Diagnoses:  Active Hospital Problems    Diagnosis  POA    *Closed fracture of right tibia and fibula [S82.201A, S82.401A]  Yes    Fall [W19.XXXA]  Yes    Anemia [D64.9]  Yes    History of CVA (cerebrovascular accident) [Z86.73]  Not Applicable    COPD (chronic obstructive pulmonary disease) [J44.9]  Yes    Essential hypertension [I10]  Yes    Moderate malnutrition [E44.0]  Yes     Moderate Malnutrition in the context of Acute Illness/Injury    Related to (etiology):  Decreased intake 2/2 nausea/vomiting and s/p appendectomy    Signs and Symptoms (as evidenced by):  Energy Intake: <75% of estimated energy requirement for >1 week  Muscle Mass Depletion: moderate depletion of temples, clavicle region and interosseous muscle   Weight Loss: 11.5% x 1 week    Interventions/Recommendations (treatment strategy):  Encourage po; continue 2000 diabetic; cardiac diet; Boost breeze TID; maintain/gain wt    Nutrition Diagnosis Status:  New      CAD (coronary artery disease) [I25.10]  Yes    Insulin dependent diabetes mellitus [E11.9, Z79.4]  Not Applicable      Resolved Hospital Problems   No resolved problems to display.       No future appointments.        I have seen and examined this patient face to face today. My clinical findings that support the need for the home health skilled services and home bound status are the following:  Weakness/numbness causing balance and gait disturbance due to Fracture making it taxing to leave home.  Requiring assistive device to leave home due to unsteady gait caused by  Fracture.    Allergies:  Review of patient's allergies indicates:   Allergen Reactions    Ace inhibitors Swelling    Corticosteroids  (glucocorticoids)     Hydralazine analogues     Tetracyclines Swelling    Travatan (with benzalkonium) [travoprost (benzalkonium)]        Diet: diabetic diet: 1800 calorie    Activities: Sitting up as tolerating, no ambulation due to fracture    Nursing:   SN to complete comprehensive assessment including routine vital signs. Instruct on disease process and s/s of complications to report to MD. Review/verify medication list sent home with the patient at time of discharge  and instruct patient/caregiver as needed. Frequency may be adjusted depending on start of care date.    Notify MD if SBP > 160 or < 90; DBP > 90 or < 50; HR > 120 or < 50; Temp > 101;       CONSULTS:    Physical Therapy to evaluate and treat. Evaluate for home safety and equipment needs; Establish/upgrade home exercise program. Perform / instruct on therapeutic exercises, gait training, transfer training, and Range of Motion.  Occupational Therapy to evaluate and treat. Evaluate home environment for safety and equipment needs. Perform/Instruct on transfers, ADL training, ROM, and therapeutic exercises.   to evaluate for community resources/long-range planning.    MISCELLANEOUS CARE:  Routine Skin for Bedridden Patients: Instruct patient/caregiver to apply moisture barrier cream to all skin folds and wet areas in perineal area daily and after baths and all bowel movements.  Diabetic Care:   Fingerstick blood sugar AC and HS    WOUND CARE ORDERS  n/a      Medications: Review discharge medications with patient and family and provide education.      Current Discharge Medication List      CONTINUE these medications which have NOT CHANGED    Details   traMADol (ULTRAM) 50 mg tablet TK 1 T PO QD PRN  Refills: 0      acetaminophen (TYLENOL) 325 MG tablet Take 2 tablets (650 mg total) by mouth every 6 (six) hours as needed for Pain.      albuterol-ipratropium (DUO-NEB) 2.5 mg-0.5 mg/3 mL nebulizer solution Take 3 mLs by nebulization every 4  (four) hours. Rescue      aspirin (ECOTRIN) 81 MG EC tablet Take 1 tablet (81 mg total) by mouth once daily.  Qty: 30 tablet, Refills: 11      cloNIDine (CATAPRES) 0.3 MG tablet Take 1 tablet (0.3 mg total) by mouth 3 (three) times daily.  Qty: 90 tablet, Refills: 3      clopidogrel (PLAVIX) 75 mg tablet Take 75 mg by mouth once daily.      diltiaZEM (CARDIZEM CD) 240 MG 24 hr capsule Take 1 capsule (240 mg total) by mouth once daily.  Qty: 30 capsule, Refills: 11      DULoxetine (CYMBALTA) 60 MG capsule Take 60 mg by mouth once daily.      fluticasone-vilanterol (BREO) 100-25 mcg/dose diskus inhaler Inhale 1 puff into the lungs once daily. Controller  Qty: 60 each, Refills: 3      furosemide (LASIX) 40 MG tablet Take 40 mg by mouth once daily.       hydrALAZINE (APRESOLINE) 25 MG tablet Take 1 tablet (25 mg total) by mouth every 8 (eight) hours.  Qty: 90 tablet, Refills: 3      insulin detemir U-100 (LEVEMIR FLEXTOUCH) 100 unit/mL (3 mL) SubQ InPn pen Inject 24 Units into the skin once daily.  Qty: 7.2 mL, Refills: 11      insulin lispro (HUMALOG KWIKPEN) 100 unit/mL InPn pen Inject 3 Units into the skin 3 (three) times daily with meals.  Qty: 15 mL, Refills: 3      Lactobacillus acidophilus 1 billion cell Cap Take 1 tablet by mouth 2 (two) times daily.      losartan (COZAAR) 100 MG tablet Take 100 mg by mouth once daily.       megestrol (MEGACE) 40 MG Tab Take 40 mg by mouth 2 (two) times daily.      ondansetron (ZOFRAN) 4 MG tablet Take 1 tablet (4 mg total) by mouth every 6 (six) hours as needed.  Qty: 30 tablet, Refills: 1      pantoprazole (PROTONIX) 40 MG tablet Take 40 mg by mouth once daily.      predniSONE (DELTASONE) 10 MG tablet Take 10 mg by mouth once daily.       pyridoxine, vitamin B6, (VITAMIN B-6) 50 MG Tab Take 1 tablet (50 mg total) by mouth once daily.  Refills: 0      senna-docusate 8.6-50 mg (SENNA WITH DOCUSATE SODIUM) 8.6-50 mg per tablet Take 1 tablet by mouth 2 (two) times daily.       simethicone 80 mg Tab Take 160 mg by mouth every 6 (six) hours as needed for Flatulence.      simvastatin (ZOCOR) 40 MG tablet Take 0.5 tablets (20 mg total) by mouth every evening.  Qty: 30 tablet, Refills: 3             I certify that this patient is confined to her home and needs intermittent skilled nursing care, physical therapy and occupational therapy.

## 2018-10-04 NOTE — ASSESSMENT & PLAN NOTE
Fall  - presents after fall from standing height - no LOC or head trauma  - utilizes walker at baseline to assist with ambulation   - imaging suggestive of nondisplaced right proximal tibial / fibular fractures  - ortho consulted while in ED  - splint applied to RLE  - NWB to RLE  - PT/OT  - case management to assist with discharge planning   - PRN pain management   - CT R knee pending - f/u on results

## 2018-10-04 NOTE — ED NOTES
Patient assisted onto bedpan per RN. Patient repositioned in bed per RN x 2. Patient family updated that staff is currently working on getting patient into a room at this time.

## 2018-10-04 NOTE — HPI
Sheryl Martines is a 63 y.o. female with history of IDDM (A1C 6.4), osteoporosis and pontine stroke 2012 presents with  right knee and leg pain after a fall from standing last night.  She was discharged from the hospital yesterday after AMS episode while in SNF.  She was walking in the front steps to her house when her knees gave out and she fell onto them, subsequently falling onto her side.  she complains of right  Knee pain and  back pain that is chronic.  She denies other msk symptoms.  Patient ambulates at baseline with Rollator.   On aspirin and plavix

## 2018-10-04 NOTE — SUBJECTIVE & OBJECTIVE
Past Medical History:   Diagnosis Date    Asthma     Closed compression fracture of fourth lumbar vertebra     COPD (chronic obstructive pulmonary disease)     Coronary artery disease     Diabetes mellitus     Glaucoma     High cholesterol     Hypertension     Iritis     Pulmonary embolus     Stroke     rt sided weakness.       Past Surgical History:   Procedure Laterality Date    ABDOMINAL SURGERY      APPENDECTOMY N/A 8/26/2018    Procedure: APPENDECTOMY;  Surgeon: Bernadine Melendrez MD;  Location: [facility] OR;  Service: General;  Laterality: N/A;    APPENDECTOMY N/A 8/26/2018    Performed by Bernadine Melendrez MD at [facility] OR    APPENDECTOMY, LAPAROSCOPIC---CONVERTED TO OPEN APPENDECTOMY @0950 N/A 8/26/2018    Performed by Bernadine Melendrez MD at [facility] OR    BACK SURGERY      stimulator    CATARACT EXTRACTION      HYSTERECTOMY      LAPAROSCOPIC APPENDECTOMY N/A 8/26/2018    Procedure: APPENDECTOMY, LAPAROSCOPIC---CONVERTED TO OPEN APPENDECTOMY @0950;  Surgeon: Bernadine Melendrez MD;  Location: [facility] OR;  Service: General;  Laterality: N/A;       Review of patient's allergies indicates:   Allergen Reactions    Ace inhibitors Swelling    Corticosteroids (glucocorticoids)     Hydralazine analogues     Tetracyclines Swelling    Travatan (with benzalkonium) [travoprost (benzalkonium)]        No current facility-administered medications for this encounter.      Current Outpatient Medications   Medication Sig    traMADol (ULTRAM) 50 mg tablet TK 1 T PO QD PRN    acetaminophen (TYLENOL) 325 MG tablet Take 2 tablets (650 mg total) by mouth every 6 (six) hours as needed for Pain.    albuterol-ipratropium (DUO-NEB) 2.5 mg-0.5 mg/3 mL nebulizer solution Take 3 mLs by nebulization every 4 (four) hours. Rescue    aspirin (ECOTRIN) 81 MG EC tablet Take 1 tablet (81 mg total) by mouth once daily.    cloNIDine (CATAPRES) 0.3 MG tablet Take 1 tablet (0.3 mg total) by mouth 3 (three) times daily.     clopidogrel (PLAVIX) 75 mg tablet Take 75 mg by mouth once daily.    diltiaZEM (CARDIZEM CD) 240 MG 24 hr capsule Take 1 capsule (240 mg total) by mouth once daily.    DULoxetine (CYMBALTA) 60 MG capsule Take 60 mg by mouth once daily.    fluticasone-vilanterol (BREO) 100-25 mcg/dose diskus inhaler Inhale 1 puff into the lungs once daily. Controller    furosemide (LASIX) 40 MG tablet Take 40 mg by mouth once daily.     hydrALAZINE (APRESOLINE) 25 MG tablet Take 1 tablet (25 mg total) by mouth every 8 (eight) hours.    insulin detemir U-100 (LEVEMIR FLEXTOUCH) 100 unit/mL (3 mL) SubQ InPn pen Inject 24 Units into the skin once daily.    insulin lispro (HUMALOG KWIKPEN) 100 unit/mL InPn pen Inject 3 Units into the skin 3 (three) times daily with meals.    Lactobacillus acidophilus 1 billion cell Cap Take 1 tablet by mouth 2 (two) times daily.    losartan (COZAAR) 100 MG tablet Take 100 mg by mouth once daily.     megestrol (MEGACE) 40 MG Tab Take 40 mg by mouth 2 (two) times daily.    ondansetron (ZOFRAN) 4 MG tablet Take 1 tablet (4 mg total) by mouth every 6 (six) hours as needed.    pantoprazole (PROTONIX) 40 MG tablet Take 40 mg by mouth once daily.    predniSONE (DELTASONE) 10 MG tablet Take 10 mg by mouth once daily.     pyridoxine, vitamin B6, (VITAMIN B-6) 50 MG Tab Take 1 tablet (50 mg total) by mouth once daily.    senna-docusate 8.6-50 mg (SENNA WITH DOCUSATE SODIUM) 8.6-50 mg per tablet Take 1 tablet by mouth 2 (two) times daily.    simethicone 80 mg Tab Take 160 mg by mouth every 6 (six) hours as needed for Flatulence.    simvastatin (ZOCOR) 40 MG tablet Take 0.5 tablets (20 mg total) by mouth every evening.     Family History     Problem Relation (Age of Onset)    Cancer Father    Diabetes Brother    Lupus Sister    Multiple sclerosis Mother        Tobacco Use    Smoking status: Never Smoker   Substance and Sexual Activity    Alcohol use: No     Frequency: Never    Drug use: No     Sexual activity: No     Partners: Male     ROS   Constitutional: negative for fevers  Eyes: negative visual changes  ENT: negative for hearing loss  Respiratory: negative for dyspnea  Cardiovascular: negative for chest pain  Gastrointestinal: negative for abdominal pain  Genitourinary: negative for dysuria  Neurological: negative for headaches  Behavioral/Psych: negative for hallucinations  Endocrine: negative for temperature intolerance    Objective:     Vital Signs (Most Recent):  Temp: 98.4 °F (36.9 °C) (10/03/18 1820)  Pulse: 84 (10/03/18 2215)  Resp: 16 (10/03/18 1820)  BP: (!) 181/86 (10/03/18 2215)  SpO2: 97 % (10/03/18 2215) Vital Signs (24h Range):  Temp:  [98.4 °F (36.9 °C)] 98.4 °F (36.9 °C)  Pulse:  [84] 84  Resp:  [16] 16  SpO2:  [97 %-98 %] 97 %  BP: (130-181)/(80-86) 181/86     Weight: 74.8 kg (165 lb)  Height: 5' (152.4 cm)  Body mass index is 32.22 kg/m².    No intake or output data in the 24 hours ending 10/03/18 2328    Ortho/SPM Exam   Vitals: Afebrile.  Vital signs stable.  General: No acute distress.  HEENT: Normocephalic. Atraumatic. Sclera anicteric. No tracheal deviation.  Cardio: Regular rate.  Chest: No increased work of breathing.  Abdominal: Nondistended.  Extremities: No cyanosis.  No clubbing.  No edema.  Palpable pulses.  Skin: No generalized rash.  Neuro: Awake. Alert. Oriented to person, place, time, and situation.  Psych: Normal appearance. Cooperative.  Appropriate mood.  Appropriate affect.      MSK:  RLE:   Skin intact  no deformity  no ecchymoses  Minimal swelling over anterior knee  TTP over anterior knee  Compartments soft and compressible  SILT SA/Son/DP/SP  Motor intact DP/SP/EHL/FHL  Brisk cap refill  Warm well perfused extremities  2+ palpable DP    LLE: Painless ROM hip, knee, ankle.  BUE: painless ROM shoulder, elbow, wrist. ecchymosis over left posterior arm (from bp cuff)  Spine: tender to palpation over lumbar spine.          Significant Labs: All pertinent labs  within the past 24 hours have been reviewed.  None    Significant Imaging: I have reviewed all pertinent imaging results/findings.   Xray tib fib: proximal non displaced oblique fibula fracture and proximal non displaced metaphyseal-diaphyseal tibia fracture.   Lumbar spine xray: pending  Xray ankle: pending

## 2018-10-04 NOTE — HOSPITAL COURSE
Pt D/C home 2 days ago after prolonged stay in SNF and hospital for open appy complicated by infection and CO2 narcosis associated with AMS. Pt was discharged mentating and baseline and without signs of infection. It was recommended she d/c to SNF for rehab and additional care due to her deconditioning but pt was adamant about wanting to return home. Pt knees gave out while ambulating outside her home and collapsed to knees and then falling on her side, resulting in closed, non-displaced proximal fracture of the R tibia and fibula    Knee xray and CT knee demonstrate non-displaced fractures of the proximal tibia and fibula. For non-operative management per ortho. Leg splinted in ED. Other than her new fracture her medical condition is as per recent hospital d/c without any new issues or medical deterioration. Patient wishes to stay in [place] with daughter, but her insurer will only cover SNF placement in [place]. Due to this the pt and daughter have decided for her to go back home to the daughter's residence despite recommendations of SNF due to pt at very high risk for having another fall secondary to her deconditioning complicated now by tibial/fibular fracture. In spite of this due to the patient's financial/insurer situation they have decided this is the best option for them.

## 2018-10-04 NOTE — CONSULTS
[facility]  Orthopedics  Consult Note    Patient Name: Sheryl Martines  MRN: [id number]  Admission Date: 10/3/2018  Hospital Length of Stay: 0 days  Attending Provider: Sudhakar Marti MD  Primary Care Provider: Primary Doctor No        Inpatient consult to Orthopedic Surgery  Consult performed by: Deven Nelson MD  Consult ordered by: Miah Cabrera PA-C        Subjective:     Principal Problem:Closed fracture of right tibia and fibula    Chief Complaint:   Chief Complaint   Patient presents with    Fall     trip and fall, right knee pain, denies head injury or LOC, takes plavix        HPI: Sheryl Martines is a 63 y.o. female  with history of DM, osteoporosis and pontine stroke 2012 presents with  right knee and leg pain after a fall from standing last night.  She was discharged from the hospital yesterday after AMS episode while in SNF.  She was walking in the front steps to her house when her knees gave out and she fell onto them, subsequently falling onto her side.   she complains of right  Knee pain and  back pain that is chronic.  She denies other msk symptoms.  Patient ambulates at baseline with Rollator.   On aspirin and plavix    Past Medical History:   Diagnosis Date    Asthma     Closed compression fracture of fourth lumbar vertebra     COPD (chronic obstructive pulmonary disease)     Coronary artery disease     Diabetes mellitus     Glaucoma     High cholesterol     Hypertension     Iritis     Pulmonary embolus     Stroke     rt sided weakness.       Past Surgical History:   Procedure Laterality Date    ABDOMINAL SURGERY      APPENDECTOMY N/A 8/26/2018    Procedure: APPENDECTOMY;  Surgeon: Bernadine Melendrez MD;  Location: [facility] OR;  Service: General;  Laterality: N/A;    APPENDECTOMY N/A 8/26/2018    Performed by Bernadine Melendrez MD at [facility] OR    APPENDECTOMY, LAPAROSCOPIC---CONVERTED TO OPEN APPENDECTOMY @0950 N/A 8/26/2018    Performed by Bernadine  WILLIAM Melendrez MD at [facility] OR    BACK SURGERY      stimulator    CATARACT EXTRACTION      HYSTERECTOMY      LAPAROSCOPIC APPENDECTOMY N/A 8/26/2018    Procedure: APPENDECTOMY, LAPAROSCOPIC---CONVERTED TO OPEN APPENDECTOMY @0950;  Surgeon: Bernadine Melendrez MD;  Location: [facility] OR;  Service: General;  Laterality: N/A;       Review of patient's allergies indicates:   Allergen Reactions    Ace inhibitors Swelling    Corticosteroids (glucocorticoids)     Hydralazine analogues     Tetracyclines Swelling    Travatan (with benzalkonium) [travoprost (benzalkonium)]        No current facility-administered medications for this encounter.      Current Outpatient Medications   Medication Sig    traMADol (ULTRAM) 50 mg tablet TK 1 T PO QD PRN    acetaminophen (TYLENOL) 325 MG tablet Take 2 tablets (650 mg total) by mouth every 6 (six) hours as needed for Pain.    albuterol-ipratropium (DUO-NEB) 2.5 mg-0.5 mg/3 mL nebulizer solution Take 3 mLs by nebulization every 4 (four) hours. Rescue    aspirin (ECOTRIN) 81 MG EC tablet Take 1 tablet (81 mg total) by mouth once daily.    cloNIDine (CATAPRES) 0.3 MG tablet Take 1 tablet (0.3 mg total) by mouth 3 (three) times daily.    clopidogrel (PLAVIX) 75 mg tablet Take 75 mg by mouth once daily.    diltiaZEM (CARDIZEM CD) 240 MG 24 hr capsule Take 1 capsule (240 mg total) by mouth once daily.    DULoxetine (CYMBALTA) 60 MG capsule Take 60 mg by mouth once daily.    fluticasone-vilanterol (BREO) 100-25 mcg/dose diskus inhaler Inhale 1 puff into the lungs once daily. Controller    furosemide (LASIX) 40 MG tablet Take 40 mg by mouth once daily.     hydrALAZINE (APRESOLINE) 25 MG tablet Take 1 tablet (25 mg total) by mouth every 8 (eight) hours.    insulin detemir U-100 (LEVEMIR FLEXTOUCH) 100 unit/mL (3 mL) SubQ InPn pen Inject 24 Units into the skin once daily.    insulin lispro (HUMALOG KWIKPEN) 100 unit/mL InPn pen Inject 3 Units into the skin 3 (three) times daily  with meals.    Lactobacillus acidophilus 1 billion cell Cap Take 1 tablet by mouth 2 (two) times daily.    losartan (COZAAR) 100 MG tablet Take 100 mg by mouth once daily.     megestrol (MEGACE) 40 MG Tab Take 40 mg by mouth 2 (two) times daily.    ondansetron (ZOFRAN) 4 MG tablet Take 1 tablet (4 mg total) by mouth every 6 (six) hours as needed.    pantoprazole (PROTONIX) 40 MG tablet Take 40 mg by mouth once daily.    predniSONE (DELTASONE) 10 MG tablet Take 10 mg by mouth once daily.     pyridoxine, vitamin B6, (VITAMIN B-6) 50 MG Tab Take 1 tablet (50 mg total) by mouth once daily.    senna-docusate 8.6-50 mg (SENNA WITH DOCUSATE SODIUM) 8.6-50 mg per tablet Take 1 tablet by mouth 2 (two) times daily.    simethicone 80 mg Tab Take 160 mg by mouth every 6 (six) hours as needed for Flatulence.    simvastatin (ZOCOR) 40 MG tablet Take 0.5 tablets (20 mg total) by mouth every evening.     Family History     Problem Relation (Age of Onset)    Cancer Father    Diabetes Brother    Lupus Sister    Multiple sclerosis Mother        Tobacco Use    Smoking status: Never Smoker   Substance and Sexual Activity    Alcohol use: No     Frequency: Never    Drug use: No    Sexual activity: No     Partners: Male     ROS   Constitutional: negative for fevers  Eyes: negative visual changes  ENT: negative for hearing loss  Respiratory: negative for dyspnea  Cardiovascular: negative for chest pain  Gastrointestinal: negative for abdominal pain  Genitourinary: negative for dysuria  Neurological: negative for headaches  Behavioral/Psych: negative for hallucinations  Endocrine: negative for temperature intolerance    Objective:     Vital Signs (Most Recent):  Temp: 98.4 °F (36.9 °C) (10/03/18 1820)  Pulse: 84 (10/03/18 2215)  Resp: 16 (10/03/18 1820)  BP: (!) 181/86 (10/03/18 2215)  SpO2: 97 % (10/03/18 2215) Vital Signs (24h Range):  Temp:  [98.4 °F (36.9 °C)] 98.4 °F (36.9 °C)  Pulse:  [84] 84  Resp:  [16] 16  SpO2:  [97  %-98 %] 97 %  BP: (130-181)/(80-86) 181/86     Weight: 74.8 kg (165 lb)  Height: 5' (152.4 cm)  Body mass index is 32.22 kg/m².    No intake or output data in the 24 hours ending 10/03/18 2328    Ortho/SPM Exam   Vitals: Afebrile.  Vital signs stable.  General: No acute distress.  HEENT: Normocephalic. Atraumatic. Sclera anicteric. No tracheal deviation.  Cardio: Regular rate.  Chest: No increased work of breathing.  Abdominal: Nondistended.  Extremities: No cyanosis.  No clubbing.  No edema.  Palpable pulses.  Skin: No generalized rash.  Neuro: Awake. Alert. Oriented to person, place, time, and situation.  Psych: Normal appearance. Cooperative.  Appropriate mood.  Appropriate affect.      MSK:  RLE:   Skin intact  no deformity  no ecchymoses  Minimal swelling over anterior knee  TTP over anterior knee  Compartments soft and compressible  SILT SA/Son/DP/SP  Motor intact DP/SP/EHL/FHL  Brisk cap refill  Warm well perfused extremities  2+ palpable DP    LLE: Painless ROM hip, knee, ankle.  BUE: painless ROM shoulder, elbow, wrist. ecchymosis over left posterior arm (from bp cuff)  Spine: tender to palpation over lumbar spine.          Significant Labs: All pertinent labs within the past 24 hours have been reviewed.  None    Significant Imaging: I have reviewed all pertinent imaging results/findings.   Xray tib fib: proximal non displaced oblique fibula fracture and proximal non displaced metaphyseal-diaphyseal tibia fracture.   Lumbar spine xray: no change from prior film  Xray ankle: healed fracture distal fibula, no acute changes.     Assessment/Plan:     * Closed fracture of right tibia and fibula    Sheryl Martines is a 63 y.o. female with Right non displaced fracture of proximal tibia/fibula   - Admit to medicine for PT evaluation, patient likely to SNF at [place]  - Splinted in ER  - NWB RLE  - follow up ct knee  - Will discuss with staff further managment              Deven Nelson MD  Orthopedics  [facility]

## 2018-10-04 NOTE — HPI
64 y/o female, who presents to the ED with R knee pain after fall from standing height.  She has a PMH of CVA, COPD, CAD, HTN, DM, HLD, and asthma.  She states that she was walking to her front step when her knees buckled, and she fell landing on her knees.  She reports using a walker at baseline to assist with ambulation.  She denies LOC or head trauma.  She was in substantial pain while in ED and received 5mg oxycodone IR, 15mg morphine PO, and 6mg morphine IM.  Imaging reveals nondisplaced right proximal tibial / fibular fractures.  She was evaluated by orthopedics while in ED and a splint was applied.     Recently admitted from SNF on 09/23/18 for AMS r/t possible Co2 narcosis, and was discharged home with home health on 10/2/2018.

## 2018-10-04 NOTE — CONSULTS
[facility]  Adult Nutrition  Consult Note    SUMMARY     Recommendations  Recommendation/Intervention:   1. Recommend Cardiac + DM 1500 diet + Boost GC TID.   2. Encouraged intake of ONS.   3. RD to follow.     Goals: PO intake >/= 75% EEN and EPN by RD f/u  Nutrition Goal Status: new  Communication of RD Recs: other (comment)(POC)    Reason for Assessment  Reason for Assessment: consult  Diagnosis: other (see comments)(closed fracture)  Relevant Medical History: Asthma, CAD, HLD, HTN, stroke, pulmonary embolism, DM   Interdisciplinary Rounds: did not attend    General Information Comments: Pt was rcently d/c from [facility] on 10/2 but was admitted to [facility] d/t fracture. While at [facility] facility pt was diagnoses w/ moderate malnutrtion. NFPE was performed by SNF RD on 9/21. As of 10/4, pt is still reporting decreased appetite w/ some nausea and is still showing physical signs of malnutrition. Pt currently on appetite stimulant but still finds it hard to eat. Encouraged pt to drink ONS. Pt lunch tray showed 50% intake, while PO intake record showed 75% meal intake in chart.     Nutrition Discharge Planning: d/c on DM diet     Nutrition Risk Screen  Nutrition Risk Screen: no indicators present    Nutrition/Diet History  Patient Reported Diet/Restrictions/Preferences: general  Typical Food/Fluid Intake: decreased intake at [facility]  Do you have any cultural, spiritual, Jew conflicts, given your current situation?: none  Factors Affecting Nutritional Intake: decreased appetite, nausea/vomiting    Anthropometrics  Temp: 97.7 °F (36.5 °C)  Height Method: Stated  Height: 5' (152.4 cm)  Height (inches): 60 in  Weight Method: Bed Scale  Weight: 74.3 kg (163 lb 12.8 oz)  Weight (lb): 163.8 lb  Ideal Body Weight (IBW), Female: 100 lb  % Ideal Body Weight, Female (lb): 163.8 lb  BMI (Calculated): 32.1  BMI Grade: 30 - 34.9- obesity - grade I       Lab/Procedures/Meds  Pertinent Labs Reviewed: reviewed  Pertinent  Labs Comments: Glu 57, HgA1C 7.1,    Pertinent Medications Reviewed: reviewed  Pertinent Medications Comments: statin, prednsion, senna-doc, furosemide, ferrous sulfate, enoxaparin, predisone, megesterol, clonidine, clopidogrel, ditizaem, duloxetine     Physical Findings/Assessment  Overall Physical Appearance: weak, listlessness, loss of muscle mass, overweight  Oral/Mouth Cavity: tooth/teeth missing  Skin: other (see comments), edema(bruising )    Estimated/Assessed Needs  Weight Used For Calorie Calculations: 74.3 kg (163 lb 12.8 oz)  Energy Calorie Requirements (kcal): 1534.4 kcal/d  Energy Need Method: [place]-St Jeor(MSJ X 1.25 PAL )  Protein Requirements: 75-90 g/d(1.0-1.2 g/kg)  Weight Used For Protein Calculations: 74.3 kg (163 lb 12.8 oz)  Fluid Requirements (mL): 1 ml/kcal or per MD   Fluid Need Method: RDA Method  RDA Method (mL): 1534.4  CHO Requirement: 50%       Nutrition Prescription Ordered  Current Diet Order: DM 1500 kcal   Oral Nutrition Supplement: Boost GC TID     Evaluation of Received Nutrient/Fluid Intake  Comments: LBM 10/1   % Intake of Estimated Energy Needs: 50 - 75 %  % Meal Intake: 50 - 75 %    Nutrition Risk  Level of Risk/Frequency of Follow-up: (f/u 2x/wk )     Assessment and Plan  9/21 Nutrition Problem:   Moderate Malnutrition in the context of Acute Illness/Injury     Related to (etiology):  Decreased intake 2/2 nausea/vomiting and s/p appendectomy     Signs and Symptoms (as evidenced by):  Energy Intake: <75% of estimated energy requirement for >1 week  Muscle Mass Depletion: moderate depletion of temples, clavicle region and interosseous muscle   Weight Loss: 11.5% x 1 week     Interventions/Recommendations (treatment strategy):  1. Recommend Cardiac + DM 1500 diet + Boost GC TID.   2. Encouraged intake of ONS.   3. RD to follow     Nutrition Diagnosis Status:  Continues     10/4: Pt nutrition problem continues from SNF admit. Pt still reports decreased appetite and is  still exhibiting signs of malnutrition     Monitor and Evaluation  Food and Nutrient Intake: energy intake, food and beverage intake  Food and Nutrient Adminstration: diet order  Knowledge/Beliefs/Attitudes: food and nutrition knowledge/skill  Physical Activity and Function: nutrition-related ADLs and IADLs  Anthropometric Measurements: weight, weight change  Biochemical Data, Medical Tests and Procedures: electrolyte and renal panel, gastrointestinal profile, glucose/endocrine profile, inflammatory profile, lipid profile  Nutrition-Focused Physical Findings: overall appearance     Nutrition Follow-Up  RD Follow-up?: Yes

## 2018-10-04 NOTE — ED NOTES
Bed: 02  Expected date: 10/4/18  Expected time: 3:35 AM  Means of arrival:   Comments:  [facility]

## 2018-10-04 NOTE — PT/OT/SLP EVAL
Physical Therapy Evaluation    Patient Name:  Sheryl Martines   MRN:  [id number]    Recommendations:     Discharge Recommendations:  nursing facility, skilled   Discharge Equipment Recommendations: (TBD)   Barriers to discharge: Inaccessible home and Decreased caregiver support (2 SHANNON home, alone during the day when her daughter is at work)    Assessment:     Sheryl Martines is a 63 y.o. female admitted with a medical diagnosis of Closed fracture of right tibia and fibula.  She presents with the following impairments/functional limitations:  weakness, impaired endurance, impaired self care skills, impaired functional mobilty, gait instability, impaired balance, decreased lower extremity function, pain, orthopedic precautions requiring ModA to partially sit EOB. Pt was unable to attempt transfer or even sit fully EOB due to RLE pain. Pt is appropriate for acute PT and will begin PT POC.    Rehab Prognosis:  Good; patient would benefit from acute skilled PT services to address these deficits and reach maximum level of function.      Recent Surgery: * No surgery found *      Plan:     During this hospitalization, patient to be seen 4 x/week to address the above listed problems via gait training, therapeutic activities, therapeutic exercises  · Plan of Care Expires:  11/03/18   Plan of Care Reviewed with: patient    Subjective     Communicated with nursing prior to session.  Patient found supine upon PT entry to room, agreeable to evaluation.      Chief Complaint: RLE pain w/ mvmt  Patient comments/goals: return to PLOF  Pain/Comfort:  · Pain Rating 1: (did not rate but reported severe RLE pain)  · Location - Side 1: Right  · Location - Orientation 1: generalized  · Location 1: leg  · Pain Addressed 1: Pre-medicate for activity, Reposition    Patients cultural, spiritual, Caodaism conflicts given the current situation: none    Living Environment:  Pt lives w/ her daughter and daughter's baby in a 1SH w/ 2 SHANNON and a  tub/shower combo.   Pt was just recently in the hospital. She was D/C'd home on 10/3 but fell outside of her house 10/4 and was then brought back to [facility] [place] w/ (R) proximal tib/fib fx.. Prior to previous hospital admission pt was Andrei using rollator for mobility and w/ sponge baths.  Patient has the following equipment: rollator, bedside commode, shower chair.  DME owned (not currently used): none.  Upon discharge, patient will have assistance from her daughter.    Objective:     Patient found with: telemetry     General Precautions: Standard, fall   Orthopedic Precautions:RLE non weight bearing   Braces: (full RLE splint)     Exams:  · Cognitive Exam:  Patient is oriented to Person, Place, Time and Situation  · Gross Motor Coordination:  WFL  · Postural Exam:  Patient presented with the following abnormalities:    · -       Rounded shoulders  · -       Forward head  · Sensation:    · -       Intact  · RLE ROM: not tested due to full RLE cast  · RLE Strength: not tested due to full RLE cast  · LLE ROM: WFL  · LLE Strength: WFL except HF 2+/5    Functional Mobility:  · Bed Mobility:    · Supine>partial sit EOB, pt not willing to allow PT/OT to assist RLE off EOB due to severe pain w/ mvmt, [place] to bring LLE of EOB and [place] for trunk to be upright  · Transfers:    · Not appropriate at this time  · Balance:   · Partial Sit EOB w/ RLE still extended on bed, SBA, ~10min    AM-PAC 6 CLICK MOBILITY  Total Score:8     Therapeutic Activities and Exercises:   Pt educated on PT role and POC. Pt verb understanding.     Patient left supine with all lines intact, call button in reach and RLE elevated.    GOALS:   Multidisciplinary Problems     Physical Therapy Goals        Problem: Physical Therapy Goal    Goal Priority Disciplines Outcome Goal Variances Interventions   Physical Therapy Goal     PT, PT/OT Ongoing (interventions implemented as appropriate)     Description:  Goals to be met by: 10/18/18     Patient will increase  functional independence with mobility by performing (all while maintaining NWB RLE):    1. Supine to sit with MInimal Assistance  2. Sit to supine with MInimal Assistance  3. Sit to stand transfer with Maximum Assistance  4. Bed to chair transfer with Maximum Assistance using Rolling Walker  5. Gait  x 5 feet with Moderate Assistance using Rolling Walker.   6. Stand for 1 minutes with Minimal Assistance using Rolling Walker                      History:     Past Medical History:   Diagnosis Date    Asthma     Closed compression fracture of fourth lumbar vertebra     COPD (chronic obstructive pulmonary disease)     Coronary artery disease     Diabetes mellitus     Glaucoma     High cholesterol     Hypertension     Iritis     Pulmonary embolus     Stroke     rt sided weakness.       Past Surgical History:   Procedure Laterality Date    ABDOMINAL SURGERY      APPENDECTOMY N/A 8/26/2018    Procedure: APPENDECTOMY;  Surgeon: Bernadine Melendrez MD;  Location: [facility] OR;  Service: General;  Laterality: N/A;    APPENDECTOMY N/A 8/26/2018    Performed by [facility]ine Melendrez MD at [facility] OR    APPENDECTOMY, LAPAROSCOPIC---CONVERTED TO OPEN APPENDECTOMY @0950 N/A 8/26/2018    Performed by [facility]ine [facility]endrez MD at [facility] OR    BACK SURGERY      stimulator    CATARACT EXTRACTION      HYSTERECTOMY      LAPAROSCOPIC APPENDECTOMY N/A 8/26/2018    Procedure: APPENDECTOMY, LAPAROSCOPIC---CONVERTED TO OPEN APPENDECTOMY @0950;  Surgeon: Bernadine Melendrez MD;  Location: [facility] OR;  Service: General;  Laterality: N/A;       Clinical Decision Making:     History  Co-morbidities and personal factors that may impact the plan of care Examination  Body Structures and Functions, activity limitations and participation restrictions that may impact the plan of care Clinical Presentation   Decision Making/ Complexity Score   Co-morbidities:   [] Time since onset of injury / illness / exacerbation  [x] Status of current  condition  []Patient's cognitive status and safety concerns    [x] Multiple Medical Problems (see med hx)  Personal Factors:   [] Patient's age  [x] Prior Level of function   [x] Patient's home situation (environment and family support)  [] Patient's level of motivation  [x] Expected progression of patient      HISTORY:(criteria)    [] 53985 - no personal factors/history    [] 92607 - has 1-2 personal factor/comorbidity     [x] 08423 - has >3 personal factor/comorbidity     Body Regions:  [] Objective examination findings  [] Head     []  Neck  [] Trunk   [] Upper Extremity  [x] Lower Extremity    Body Systems:  [] For communication ability, affect, cognition, language, and learning style: the assessment of the ability to make needs known, consciousness, orientation (person, place, and time), expected emotional /behavioral responses, and learning preferences (eg, learning barriers, education  needs)  [] For the neuromuscular system: a general assessment of gross coordinated movement (eg, balance, gait, locomotion, transfers, and transitions) and motor function  (motor control and motor learning)  [x] For the musculoskeletal system: the assessment of gross symmetry, gross range of motion, gross strength, height, and weight  [] For the integumentary system: the assessment of pliability(texture), presence of scar formation, skin color, and skin integrity  [] For cardiovascular/pulmonary system: the assessment of heart rate, respiratory rate, blood pressure, and edema     Activity limitations:    [] Patient's cognitive status and saf ety concerns          [x] Status of current condition      [x] Weight bearing restriction  [] Cardiopulmunary Restriction    Participation Restrictions:   [] Goals and goal agreement with the patient     [] Rehab potential (prognosis) and probable outcome      Examination of Body System: (criteria)    [] 15127 - addressing 1-2 elements    [] 39115 - addressing a total of 3 or more elements      [x] 14899 -  Addressing a total of 4 or more elements         Clinical Presentation: (criteria)  Evolving - 96067     On examination of body system using standardized tests and measures patient presents with 4 or more elements from any of the following: body structures and functions, activity limitations, and/or participation restrictions.  Leading to a clinical presentation that is considered evolving with changing characteristics                              Clinical Decision Making  (Eval Complexity):  Moderate - 48174     Time Tracking:     PT Received On: 10/04/18  PT Start Time: 0907     PT Stop Time: 0933  PT Total Time (min): 26 min     Billable Minutes: Evaluation 16, Therapeutic Activity 10 and Total Time 26      Maddy Davies, PT  10/04/2018

## 2018-10-04 NOTE — PLAN OF CARE
Problem: Physical Therapy Goal  Goal: Physical Therapy Goal  Goals to be met by: 10/18/18     Patient will increase functional independence with mobility by performing (all while maintaining NWB RLE):    1. Supine to sit with MInimal Assistance  2. Sit to supine with MInimal Assistance  3. Sit to stand transfer with Maximum Assistance  4. Bed to chair transfer with Maximum Assistance using Rolling Walker  5. Gait  x 5 feet with Moderate Assistance using Rolling Walker.   6. Stand for 1 minutes with Minimal Assistance using Rolling Walker    Outcome: Ongoing (interventions implemented as appropriate)  PT eval completed. Pt will begin PT POC.    Maddy Davies, PT  10/4/2018

## 2018-10-04 NOTE — PT/OT/SLP EVAL
Occupational Therapy   Evaluation    Name: Sheryl Martines  MRN: [id number]  Admitting Diagnosis:  Closed fracture of right tibia and fibula      Recommendations:     Discharge Recommendations: nursing facility, skilled  Discharge Equipment Recommendations:     Barriers to discharge:  Inaccessible home environment, Decreased caregiver support    History:     Occupational Profile:  Living Environment: Lives with dtr with 2 SHANNON home, dtr works  Previous level of function: sponge bathes, was mod I/I with basic ADL's prior to admit to SNF then back to [facility]; per notes, pt d/c'd home with HH with therapy recommending SNF and pt at mod to [facility] for transfers and gait using walker x 20 ft on last visit before d/c  Roles and Routines: none stated  Equipment Used at Home:  bedside commode, rollator, shower chair  Assistance upon Discharge: some, dtr works    Past Medical History:   Diagnosis Date    Asthma     Closed compression fracture of fourth lumbar vertebra     COPD (chronic obstructive pulmonary disease)     Coronary artery disease     Diabetes mellitus     Glaucoma     High cholesterol     Hypertension     Iritis     Pulmonary embolus     Stroke     rt sided weakness.       Past Surgical History:   Procedure Laterality Date    ABDOMINAL SURGERY      APPENDECTOMY N/A 8/26/2018    Procedure: APPENDECTOMY;  Surgeon: Bernadine Melendrez MD;  Location: [facility] OR;  Service: General;  Laterality: N/A;    APPENDECTOMY N/A 8/26/2018    Performed by Bernadine Melendrez MD at [facility] OR    APPENDECTOMY, LAPAROSCOPIC---CONVERTED TO OPEN APPENDECTOMY @0950 N/A 8/26/2018    Performed by [facility]ine Melendrez MD at [facility] OR    BACK SURGERY      stimulator    CATARACT EXTRACTION      HYSTERECTOMY      LAPAROSCOPIC APPENDECTOMY N/A 8/26/2018    Procedure: APPENDECTOMY, LAPAROSCOPIC---CONVERTED TO OPEN APPENDECTOMY @0950;  Surgeon: Bernadine Melendrez MD;  Location: [facility] OR;  Service: General;  Laterality: N/A;        Subjective     Chief Complaint: RLE pain  Patient/Family Comments/goals: c/o R leg pain around her knee    Pain/Comfort:  · Pain Rating 1: (no number given but shouting in pain with mobility)  · Location - Side 1: Right  · Location 1: leg  · Pain Addressed 1: Reposition, Distraction, Cessation of Activity    Patients cultural, spiritual, Christianity conflicts given the current situation: none    Objective:     Communicated with: nsg and PT prior to session.  Patient found call button in reach and telemetry upon OT entry to room.    General Precautions: Standard, fall   Orthopedic Precautions:RLE non weight bearing   Braces:       Occupational Performance:    Bed Mobility:    · Sup to partial long sitting with LLE off of bed and RLE supported on bed with mod assist for RLE and able to bring trunk off of raised HOB without assist  · Scooting in supine with total assist x 2    Functional Mobility/Transfers:  ·   · Functional Mobility: deferred due to pain and u/a to attempt    Activities of Daily Living:  · Grooming sitting up in bed partial long sitting to brush teeth and wash face with setup    Cognitive/Visual Perceptual:  Oriented to person, place, month, year, follows commands, sleepy throughout    Physical Exam:  wfl BUE ROM/strength per observation    AMPA 6 Click ADL:  AMPAC Total Score: 17    Treatment & Education:  -Pt edu on OT role/POC  -Importance of sitting up in bed with staff assistance  -Safety during functional t/f and mobility   - White board updated  - Multiple self care tasks completed-- assistance level noted above  - All questions/concerns answered within OT scope of practice  -WB precautions           Education:    Patient left HOB elevated with call button in reach    Assessment:     Sheryl Martines is a 63 y.o. female with a medical diagnosis of Closed fracture of right tibia and fibula.  She presents with the following performance deficits affecting function: weakness, impaired  "endurance, impaired self care skills, impaired functional mobilty, pain, decreased lower extremity function, orthopedic precautions.      Rehab Prognosis: Good; patient would benefit from acute skilled OT services to address these deficits and reach maximum level of function.         Clinical Decision Makin.  OT Low:  "Pt evaluation falls under low complexity for evaluation coding due to performance deficits noted in 1-3 areas as stated above and 0 co-morbities affecting current functional status. Data obtained from problem focused assessments. No modifications or assistance was required for completion of evaluation. Only brief occupational profile and history review completed."     Plan:     Patient to be seen 4 x/week to address the above listed problems via self-care/home management, therapeutic activities, therapeutic exercises  · Plan of Care Expires: 18  · Plan of Care Reviewed with: patient    This Plan of care has been discussed with the patient who was involved in its development and understands and is in agreement with the identified goals and treatment plan    GOALS:   Multidisciplinary Problems     Occupational Therapy Goals        Problem: Occupational Therapy Goal    Goal Priority Disciplines Outcome Interventions   Occupational Therapy Goal     OT, PT/OT Ongoing (interventions implemented as appropriate)    Description:  Goals to be met by: 10/18/18     Patient will increase functional independence with ADLs by performing:    UE Dressing with Set-up Assistance and Supervision.  LE Dressing with Minimal Assistance, with modification/AE prn  Grooming while seated with Supervision.  Toileting from bedside commode with Moderate Assistance for hygiene and clothing management.   Toilet transfer to bedside commode with Minimal Assistance.                      Time Tracking:     OT Date of Treatment: 10/04/18  OT Start Time: 908  OT Stop Time: 933  OT Total Time (min): 25 min    Billable " Minutes:Evaluation 25 min    Cathy Lewis OT  10/4/2018

## 2018-10-04 NOTE — ASSESSMENT & PLAN NOTE
- denies CP / SOB  - EKG without obvious acute ischemic changes  - continue ASA and statin   - maintain on telemetry   - trend electrolytes and replete as indicated

## 2018-10-04 NOTE — PLAN OF CARE
Problem: Patient Care Overview  Goal: Plan of Care Review  Outcome: Ongoing (interventions implemented as appropriate)  Assessment and Plan  9/21 Nutrition Problem:   Moderate Malnutrition in the context of Acute Illness/Injury     Related to (etiology):  Decreased intake 2/2 nausea/vomiting and s/p appendectomy     Signs and Symptoms (as evidenced by):  Energy Intake: <75% of estimated energy requirement for >1 week  Muscle Mass Depletion: moderate depletion of temples, clavicle region and interosseous muscle   Weight Loss: 11.5% x 1 week     Interventions/Recommendations (treatment strategy):  1. Recommend Cardiac + DM 1500 diet + Boost GC TID.   2. Encouraged intake of ONS.   3. RD to follow     Nutrition Diagnosis Status:  Continues      10/4: Pt nutrition problem continues from SNF admit. Pt still reports decreased appetite and is still exhibiting signs of malnutrition     Goals: PO intake >/= 75% EEN and EPN by RD f/u  Nutrition Goal Status: new

## 2018-10-04 NOTE — SUBJECTIVE & OBJECTIVE
Past Medical History:   Diagnosis Date    Asthma     Closed compression fracture of fourth lumbar vertebra     COPD (chronic obstructive pulmonary disease)     Coronary artery disease     Diabetes mellitus     Glaucoma     High cholesterol     Hypertension     Iritis     Pulmonary embolus     Stroke     rt sided weakness.       Past Surgical History:   Procedure Laterality Date    ABDOMINAL SURGERY      APPENDECTOMY N/A 8/26/2018    Procedure: APPENDECTOMY;  Surgeon: Bernadine Melendrez MD;  Location: [facility] OR;  Service: General;  Laterality: N/A;    APPENDECTOMY N/A 8/26/2018    Performed by Bernadine Melendrez MD at [facility] OR    APPENDECTOMY, LAPAROSCOPIC---CONVERTED TO OPEN APPENDECTOMY @0950 N/A 8/26/2018    Performed by [facility]rez MD at [facility] OR    BACK SURGERY      stimulator    CATARACT EXTRACTION      HYSTERECTOMY      LAPAROSCOPIC APPENDECTOMY N/A 8/26/2018    Procedure: APPENDECTOMY, LAPAROSCOPIC---CONVERTED TO OPEN APPENDECTOMY @0950;  Surgeon: Bernadine Melendrez MD;  Location: [facility] OR;  Service: General;  Laterality: N/A;       Review of patient's allergies indicates:   Allergen Reactions    Ace inhibitors Swelling    Corticosteroids (glucocorticoids)     Hydralazine analogues     Tetracyclines Swelling    Travatan (with benzalkonium) [travoprost (benzalkonium)]        No current facility-administered medications on file prior to encounter.      Current Outpatient Medications on File Prior to Encounter   Medication Sig    traMADol (ULTRAM) 50 mg tablet TK 1 T PO QD PRN    acetaminophen (TYLENOL) 325 MG tablet Take 2 tablets (650 mg total) by mouth every 6 (six) hours as needed for Pain.    albuterol-ipratropium (DUO-NEB) 2.5 mg-0.5 mg/3 mL nebulizer solution Take 3 mLs by nebulization every 4 (four) hours. Rescue    aspirin (ECOTRIN) 81 MG EC tablet Take 1 tablet (81 mg total) by mouth once daily.    cloNIDine (CATAPRES) 0.3 MG tablet Take 1 tablet (0.3 mg total) by  mouth 3 (three) times daily.    clopidogrel (PLAVIX) 75 mg tablet Take 75 mg by mouth once daily.    diltiaZEM (CARDIZEM CD) 240 MG 24 hr capsule Take 1 capsule (240 mg total) by mouth once daily.    DULoxetine (CYMBALTA) 60 MG capsule Take 60 mg by mouth once daily.    fluticasone-vilanterol (BREO) 100-25 mcg/dose diskus inhaler Inhale 1 puff into the lungs once daily. Controller    furosemide (LASIX) 40 MG tablet Take 40 mg by mouth once daily.     hydrALAZINE (APRESOLINE) 25 MG tablet Take 1 tablet (25 mg total) by mouth every 8 (eight) hours.    insulin detemir U-100 (LEVEMIR FLEXTOUCH) 100 unit/mL (3 mL) SubQ InPn pen Inject 24 Units into the skin once daily.    insulin lispro (HUMALOG KWIKPEN) 100 unit/mL InPn pen Inject 3 Units into the skin 3 (three) times daily with meals.    Lactobacillus acidophilus 1 billion cell Cap Take 1 tablet by mouth 2 (two) times daily.    losartan (COZAAR) 100 MG tablet Take 100 mg by mouth once daily.     megestrol (MEGACE) 40 MG Tab Take 40 mg by mouth 2 (two) times daily.    ondansetron (ZOFRAN) 4 MG tablet Take 1 tablet (4 mg total) by mouth every 6 (six) hours as needed.    pantoprazole (PROTONIX) 40 MG tablet Take 40 mg by mouth once daily.    predniSONE (DELTASONE) 10 MG tablet Take 10 mg by mouth once daily.     pyridoxine, vitamin B6, (VITAMIN B-6) 50 MG Tab Take 1 tablet (50 mg total) by mouth once daily.    senna-docusate 8.6-50 mg (SENNA WITH DOCUSATE SODIUM) 8.6-50 mg per tablet Take 1 tablet by mouth 2 (two) times daily.    simethicone 80 mg Tab Take 160 mg by mouth every 6 (six) hours as needed for Flatulence.    simvastatin (ZOCOR) 40 MG tablet Take 0.5 tablets (20 mg total) by mouth every evening.     Family History     Problem Relation (Age of Onset)    Cancer Father    Diabetes Brother    Lupus Sister    Multiple sclerosis Mother        Tobacco Use    Smoking status: Never Smoker   Substance and Sexual Activity    Alcohol use: No      Frequency: Never    Drug use: No    Sexual activity: No     Partners: Male     Review of Systems   Constitutional: Negative for chills and fever.   HENT: Negative for congestion and sore throat.    Eyes: Negative for pain and visual disturbance.   Respiratory: Negative for cough, chest tightness and shortness of breath.    Cardiovascular: Negative for chest pain and palpitations.   Gastrointestinal: Negative for abdominal pain, diarrhea, nausea and vomiting.   Genitourinary: Negative for dysuria and frequency.   Musculoskeletal: Positive for arthralgias, back pain and myalgias.   Skin: Negative for color change and rash.   Neurological: Negative for seizures and syncope.   Hematological: Bruises/bleeds easily.     Objective:     Vital Signs (Most Recent):  Temp: 98.4 °F (36.9 °C) (10/03/18 1820)  Pulse: 104 (10/04/18 0248)  Resp: 18 (10/04/18 0248)  BP: (!) 143/82 (10/04/18 0245)  SpO2: 98 % (10/04/18 0251) Vital Signs (24h Range):  Temp:  [98.4 °F (36.9 °C)] 98.4 °F (36.9 °C)  Pulse:  [] 104  Resp:  [16-18] 18  SpO2:  [97 %-98 %] 98 %  BP: (130-181)/(80-86) 143/82     Weight: 74.8 kg (165 lb)  Body mass index is 32.22 kg/m².    Physical Exam   Constitutional: She is oriented to person, place, and time. She appears well-developed and well-nourished. No distress.   HENT:   Head: Normocephalic and atraumatic.   Eyes: EOM are normal. Pupils are equal, round, and reactive to light.   Neck: Normal range of motion. Neck supple.   Cardiovascular: Normal rate, regular rhythm, normal heart sounds and intact distal pulses.   Pulmonary/Chest: Effort normal and breath sounds normal. No respiratory distress.   Abdominal: Soft. Bowel sounds are normal. She exhibits no distension. There is no tenderness.   Musculoskeletal: Normal range of motion. She exhibits edema.   Large bulky splint to RLE - sensory / motor intact    Neurological: She is alert and oriented to person, place, and time.   Skin: Skin is warm and dry. She  is not diaphoretic.   Multiple areas of ecchymosis    Psychiatric: She has a normal mood and affect. Her behavior is normal.   Nursing note and vitals reviewed.        CRANIAL NERVES     CN III, IV, VI   Pupils are equal, round, and reactive to light.  Extraocular motions are normal.        Significant Labs:   CBC:   Recent Labs   Lab  10/02/18   0522  10/04/18   0302   WBC  7.93  8.85   HGB  9.4*  8.9*   HCT  31.6*  29.4*   PLT  175  180     CMP:   Recent Labs   Lab  10/02/18   0522  10/04/18   0302   NA  140  140   K  3.9  3.6   CL  106  104   CO2  30*  28   GLU  279*  57*   BUN  13  14   CREATININE  0.9  0.8   CALCIUM  8.9  9.5   ANIONGAP  4*  8   EGFRNONAA  >60.0  >60.0       Significant Imaging: I have reviewed all pertinent imaging results/findings within the past 24 hours.     Medical record, imaging, EKG, and medications reviewed

## 2018-10-04 NOTE — ASSESSMENT & PLAN NOTE
Sheryl Martines is a 63 y.o. female with Right non displaced fracture of proximal tibia/fibula   - Admit to medicine for PT evaluation; will likely need SNF placement  - Splinted in ER  - NWB RLE  - f/u CT knee to assess for intraarticular extension  - will discuss further management with staff

## 2018-10-04 NOTE — ASSESSMENT & PLAN NOTE
- SBP ranging between 130-181  - possibly elevated d/t discomfort  - resume home antihypertensive regimen   - monitor and adjust therapy as indicated

## 2018-10-04 NOTE — PLAN OF CARE
Problem: Diabetes, Type 2 (Adult)  Goal: Signs and Symptoms of Listed Potential Problems Will be Absent, Minimized or Managed (Diabetes, Type 2)  Signs and symptoms of listed potential problems will be absent, minimized or managed by discharge/transition of care (reference Diabetes, Type 2 (Adult) CPG).   10/04/18 1644   Diabetes, Type 2   Problems Assessed (Type 2 Diabetes) all;DKA (diabetic ketoacidosis)/HHS (hyperosmolar hyperglycemic state);hyperglycemia;hypoglycemia;situational response     No signs and symptoms of hypo/hyperglycemia present. Accu-checks AC/HS. Will continue to monitor.     Problem: Fall Risk (Pediatric)  Goal: Identify Related Risk Factors and Signs and Symptoms  Related risk factors and signs and symptoms are identified upon initiation of Human Response Clinical Practice Guideline (CPG)   10/04/18 1644   Fall Risk   Related Risk Factors (Fall Risk) bedrest;polypharmacy;history of falls     Fall precautions in place. No falls occurred during this shift. Will continue to monitor.     Problem: Patient Care Overview  Goal: Plan of Care Review  Outcome: Ongoing (interventions implemented as appropriate)   10/04/18 1644   Coping/Psychosocial   Plan Of Care Reviewed With patient;family     Patient is awake, alert and oriented. No apparent distress. VSS. Patient on bedrest with RLE in splint and elevated with pillows. Patient able to verbalize pain relief and pain goals. PRN pain medication administered per MAR. Patient is able to turn self in bed independently. Patient is tolerating diet well, and fluid intake is adequate. Patient and daughter updated regarding plan of care at bedside. No other concerns at this time. Will continue to monitor.

## 2018-10-04 NOTE — ASSESSMENT & PLAN NOTE
- most recent albumin 2.5 (10/01)  - prealbumin pending   - NPO until cleared by Ortho   - previous nutrition rec's 09/21/18 encourage po intake, 2000 diabetic / cardiac, Boost breeze TID

## 2018-10-04 NOTE — ASSESSMENT & PLAN NOTE
- denies SOB / wheezing  - in NAD  - resume home medication regimen   - supplemental oxygen as needed to maintain saturations > 92%  - duo nebs PRN SOB / Wheezing

## 2018-10-04 NOTE — ED PROVIDER NOTES
Source of History:  Patient     Chief complaint:  Fall (trip and fall, right knee pain, denies head injury or LOC, takes plavix)      HPI:  Sheryl Martines is a 63 y.o. female presenting with right knee and leg pain after a fall last night.  She was discharged from the hospital yesterday.  She was walking in the front steps to her house when her knees gave out and she fell onto them, subsequently falling onto her side.  Only her right knee hurts.  Her back hurts but that is chronic and unchanged.     ROS: As per HPI and below:  General: No fever.  No chills.  Head: No headache.  No loss of consciousness or amnesia.  Neck: No neck pain.  Back: Notes back pain.  Extremities: Notes extremity pain.  Chest: No shortness of breath.  No chest pain.  Cardiovascular: No palpitations.  Abdomen: No abdominal pain.  No nausea or vomiting.  Integument: No rashes or bruising.  Eyes: No visual changes.  Urinary: No hematuria.  Neurologic: No numbness.  No focal weakness.      Review of patient's allergies indicates:   Allergen Reactions    Ace inhibitors Swelling    Corticosteroids (glucocorticoids)     Hydralazine analogues     Tetracyclines Swelling    Travatan (with benzalkonium) [travoprost (benzalkonium)]        No current facility-administered medications on file prior to encounter.      Current Outpatient Medications on File Prior to Encounter   Medication Sig Dispense Refill    traMADol (ULTRAM) 50 mg tablet TK 1 T PO QD PRN  0    acetaminophen (TYLENOL) 325 MG tablet Take 2 tablets (650 mg total) by mouth every 6 (six) hours as needed for Pain.      albuterol-ipratropium (DUO-NEB) 2.5 mg-0.5 mg/3 mL nebulizer solution Take 3 mLs by nebulization every 4 (four) hours. Rescue      aspirin (ECOTRIN) 81 MG EC tablet Take 1 tablet (81 mg total) by mouth once daily. 30 tablet 11    cloNIDine (CATAPRES) 0.3 MG tablet Take 1 tablet (0.3 mg total) by mouth 3 (three) times daily. 90 tablet 3    clopidogrel (PLAVIX) 75 mg  tablet Take 75 mg by mouth once daily.      diltiaZEM (CARDIZEM CD) 240 MG 24 hr capsule Take 1 capsule (240 mg total) by mouth once daily. 30 capsule 11    DULoxetine (CYMBALTA) 60 MG capsule Take 60 mg by mouth once daily.      fluticasone-vilanterol (BREO) 100-25 mcg/dose diskus inhaler Inhale 1 puff into the lungs once daily. Controller 60 each 3    furosemide (LASIX) 40 MG tablet Take 40 mg by mouth once daily.       hydrALAZINE (APRESOLINE) 25 MG tablet Take 1 tablet (25 mg total) by mouth every 8 (eight) hours. 90 tablet 3    insulin detemir U-100 (LEVEMIR FLEXTOUCH) 100 unit/mL (3 mL) SubQ InPn pen Inject 24 Units into the skin once daily. 7.2 mL 11    insulin lispro (HUMALOG KWIKPEN) 100 unit/mL InPn pen Inject 3 Units into the skin 3 (three) times daily with meals. 15 mL 3    Lactobacillus acidophilus 1 billion cell Cap Take 1 tablet by mouth 2 (two) times daily.      losartan (COZAAR) 100 MG tablet Take 100 mg by mouth once daily.       megestrol (MEGACE) 40 MG Tab Take 40 mg by mouth 2 (two) times daily.      ondansetron (ZOFRAN) 4 MG tablet Take 1 tablet (4 mg total) by mouth every 6 (six) hours as needed. 30 tablet 1    pantoprazole (PROTONIX) 40 MG tablet Take 40 mg by mouth once daily.      predniSONE (DELTASONE) 10 MG tablet Take 10 mg by mouth once daily.       pyridoxine, vitamin B6, (VITAMIN B-6) 50 MG Tab Take 1 tablet (50 mg total) by mouth once daily.  0    senna-docusate 8.6-50 mg (SENNA WITH DOCUSATE SODIUM) 8.6-50 mg per tablet Take 1 tablet by mouth 2 (two) times daily.      simethicone 80 mg Tab Take 160 mg by mouth every 6 (six) hours as needed for Flatulence.      simvastatin (ZOCOR) 40 MG tablet Take 0.5 tablets (20 mg total) by mouth every evening. 30 tablet 3       PMH:  As per HPI and below:  Past Medical History:   Diagnosis Date    Asthma     Closed compression fracture of fourth lumbar vertebra     COPD (chronic obstructive pulmonary disease)     Coronary  artery disease     Diabetes mellitus     Glaucoma     High cholesterol     Hypertension     Iritis     Pulmonary embolus     Stroke     rt sided weakness.     Past Surgical History:   Procedure Laterality Date    ABDOMINAL SURGERY      APPENDECTOMY N/A 8/26/2018    Procedure: APPENDECTOMY;  Surgeon: Bernadine Melendrez MD;  Location: [facility] OR;  Service: General;  Laterality: N/A;    APPENDECTOMY N/A 8/26/2018    Performed by Berndaine Melendrez MD at [facility] OR    APPENDECTOMY, LAPAROSCOPIC---CONVERTED TO OPEN APPENDECTOMY @0950 N/A 8/26/2018    Performed by [facility]ine [facility]rez MD at [facility] OR    BACK SURGERY      stimulator    CATARACT EXTRACTION      HYSTERECTOMY      LAPAROSCOPIC APPENDECTOMY N/A 8/26/2018    Procedure: APPENDECTOMY, LAPAROSCOPIC---CONVERTED TO OPEN APPENDECTOMY @0950;  Surgeon: Bernadine Melendrez MD;  Location: [facility] OR;  Service: General;  Laterality: N/A;       Social History     Socioeconomic History    Marital status:      Spouse name: None    Number of children: None    Years of education: None    Highest education level: None   Social Needs    Financial resource strain: None    Food insecurity - worry: None    Food insecurity - inability: None    Transportation needs - medical: None    Transportation needs - non-medical: None   Occupational History    None   Tobacco Use    Smoking status: Never Smoker   Substance and Sexual Activity    Alcohol use: No     Frequency: Never    Drug use: No    Sexual activity: No     Partners: Male   Other Topics Concern    None   Social History Narrative    None       Family History   Problem Relation Age of Onset    Cancer Father     Diabetes Brother     Multiple sclerosis Mother     Lupus Sister        Physical Exam:    Vitals:    10/03/18 2215   BP: (!) 181/86   Pulse: 84   Resp:    Temp:      Appearance: No acute distress.  Head: Atraumatic.  Neck: No cervical spine tenderness.  Full range of motion.  No soft  "tissue tenderness.  Back:  Minimal bilateral low back muscular tenderness without bony tenderness.  Chest: No chest wall tenderness.  Breath sounds are equal bilaterally.  No wheezes.  No rhonchi.  No rales.  Cardiovascular: Regular rate and rhythm.  No murmurs.  No gallops.  No rubs.  Abdomen: Soft. Nontender.   No distention.  No guarding. No rebound.  No ecchymoses.  Integument: No ecchymoses or other signs of trauma.  Musculoskeletal: Right knee tenderness.  Increases with any leg movement.  No hip tenderness.    Neurologic: Motor intact.  Sensation intact.  Cranial nerves II through XII intact.    Mental status: Alert and oriented x 3.  GCS 15.      Laboratory Studies:  Labs Reviewed   MAGNESIUM   TRANSFERRIN   PREALBUMIN   HEMOGLOBIN A1C   CBC W/ AUTO DIFFERENTIAL   BASIC METABOLIC PANEL   PROTIME-INR   APTT   URINALYSIS   HEMOGLOBIN A1C   PREALBUMIN   PROCALCITONIN   TRANSFERRIN   TYPE & SCREEN           Imaging Results          X-Ray Chest 1 View (Final result)  Result time 10/04/18 01:32:44    Final result by Km Larios MD (10/04/18 01:32:44)                 Impression:      No detrimental change when compared with 09/26/2018.      Electronically signed by: Km Larios MD  Date:    10/04/2018  Time:    01:32             Narrative:    EXAMINATION:  XR CHEST 1 VIEW    CLINICAL HISTORY:  Provided history is "pre-op;  ".    TECHNIQUE:  One view of the chest.    COMPARISON:  09/26/2018.    FINDINGS:  Patient is rotated.  Cardiac silhouette is magnified and likely at the upper limits of normal in size.  No large focal consolidation.  Spinal stimulator device is noted.  No detrimental change.                               X-Ray Lumbar Spine 2 Or 3 Views (Final result)  Result time 10/04/18 01:37:09    Final result by Km Larios MD (10/04/18 01:37:09)                 Impression:    FINDINGS/  Evaluation is significantly limited by cross-table lateral technique and underpenetration.  Spinal " stimulator device and left hip arthroplasty noted.  There are several vertebral body compression deformities, overall similar to the prior exam, and better characterized on prior abdominal CT.  If there is concern for acute fracture, consider additional evaluation with CT or MRI.      Electronically signed by: Km Larios MD  Date:    10/04/2018  Time:    01:37             Narrative:    EXAMINATION:  XR LUMBAR SPINE 2 OR 3 VIEWS    CLINICAL HISTORY:  Low back pain, risk factors (osteoporosis or chronic steroid use or elderly);    TECHNIQUE:  Three views of the lumbar spine.    COMPARISON:  Prior lumbar spine radiograph, 08/14/2018.  Abdominopelvic CT, 09/30/2018.                               X-Ray Ankle Complete Right (Final result)  Result time 10/04/18 01:34:27    Final result by Km Larios MD (10/04/18 01:34:27)                 Impression:    FINDINGS/  Mild irregularity involving the distal fibular shaft, which may be a chronic finding.  No displaced fracture identified.  Alignment appears normal.  Soft tissues are symmetric.      Electronically signed by: Km Larios MD  Date:    10/04/2018  Time:    01:34             Narrative:    EXAMINATION:  XR ANKLE COMPLETE 3 VIEW RIGHT    CLINICAL HISTORY:  pain;    TECHNIQUE:  Three views of the right ankle.    COMPARISON:  Right tibia radiographs, same day.                               X-Ray Femur Ap/Lat Right (Final result)  Result time 10/03/18 21:55:10    Final result by Km Larios MD (10/03/18 21:55:10)                 Impression:      Nondisplaced fractures involving the proximal tibia and fibular shafts.  No intra-articular involvement.      Electronically signed by: Km Larios MD  Date:    10/03/2018  Time:    21:55             Narrative:    EXAMINATION:  XR KNEE 1 OR 2 VIEW RIGHT; XR FEMUR 2 VIEW RIGHT; XR TIBIA FIBULA 2 VIEW RIGHT    CLINICAL HISTORY:  right knee pain;  Unspecified injury of right lower leg, initial  encounter    TECHNIQUE:  Two views right femur, two views right tibia/fibula, and two views right knee.    COMPARISON:  None.    FINDINGS:  There are nondisplaced incomplete fractures involving the proximal tibial and fibular shafts.  No intra-articular involvement identified.  No additional fracture is identified.  Alignment is preserved.  Mild edema about the knee and proximal tibia.  Possible small joint effusion.  No unexpected radiopaque foreign body.                               X-Ray Tibia Fibula 2 View Right (Final result)  Result time 10/03/18 21:55:10    Final result by Km Larios MD (10/03/18 21:55:10)                 Impression:      Nondisplaced fractures involving the proximal tibia and fibular shafts.  No intra-articular involvement.      Electronically signed by: Km Larios MD  Date:    10/03/2018  Time:    21:55             Narrative:    EXAMINATION:  XR KNEE 1 OR 2 VIEW RIGHT; XR FEMUR 2 VIEW RIGHT; XR TIBIA FIBULA 2 VIEW RIGHT    CLINICAL HISTORY:  right knee pain;  Unspecified injury of right lower leg, initial encounter    TECHNIQUE:  Two views right femur, two views right tibia/fibula, and two views right knee.    COMPARISON:  None.    FINDINGS:  There are nondisplaced incomplete fractures involving the proximal tibial and fibular shafts.  No intra-articular involvement identified.  No additional fracture is identified.  Alignment is preserved.  Mild edema about the knee and proximal tibia.  Possible small joint effusion.  No unexpected radiopaque foreign body.                               X-Ray Pelvis Routine AP (Final result)  Result time 10/03/18 21:58:15    Final result by Km Larios MD (10/03/18 21:58:15)                 Impression:      No displaced pelvic fracture.      Electronically signed by: Km Larios MD  Date:    10/03/2018  Time:    21:58             Narrative:    EXAMINATION:  XR PELVIS ROUTINE AP    CLINICAL HISTORY:  fall;    TECHNIQUE:  AP view of  the pelvis was performed.    COMPARISON:  None.    FINDINGS:  Bones are demineralized.  There is a left total hip arthroplasty.  Spinal stimulator device is partially visualized overlying the left hemiabdomen.  No displaced fracture is identified in the pelvis.                               X-Ray Knee 1 or 2 View Right (Final result)  Result time 10/03/18 21:55:10   Procedure changed from X-Ray Knee 3 View Right     Final result by Km Larios MD (10/03/18 21:55:10)                 Impression:      Nondisplaced fractures involving the proximal tibia and fibular shafts.  No intra-articular involvement.      Electronically signed by: Km Larios MD  Date:    10/03/2018  Time:    21:55             Narrative:    EXAMINATION:  XR KNEE 1 OR 2 VIEW RIGHT; XR FEMUR 2 VIEW RIGHT; XR TIBIA FIBULA 2 VIEW RIGHT    CLINICAL HISTORY:  right knee pain;  Unspecified injury of right lower leg, initial encounter    TECHNIQUE:  Two views right femur, two views right tibia/fibula, and two views right knee.    COMPARISON:  None.    FINDINGS:  There are nondisplaced incomplete fractures involving the proximal tibial and fibular shafts.  No intra-articular involvement identified.  No additional fracture is identified.  Alignment is preserved.  Mild edema about the knee and proximal tibia.  Possible small joint effusion.  No unexpected radiopaque foreign body.                                Medications Given:  Medications   morphine tablet 15 mg (15 mg Oral Given 10/3/18 2038)   oxyCODONE immediate release tablet 5 mg (5 mg Oral Given 10/3/18 2309)   morphine injection 6 mg (6 mg Intramuscular Given 10/4/18 0217)           MDM:    63 y.o. female with proximal nondisplaced tibia and fibula fracture.   The case will be discussed with Orthopedics,  Orthopedics has placed the patient in a splint.  They believe management will be non operative.  Secondary to patient's pain and need for PT/ OT, she will be admitted to Hospital  Medicine observation.      Diagnostic Impression:    1. Other closed fracture of proximal end of right tibia, initial encounter    2. Right knee injury, initial encounter    3. Preop cardiovascular exam    4. Other closed fracture of proximal end of right fibula, initial encounter               Miah Cabrera PA-C  10/04/18 0246

## 2018-10-04 NOTE — ASSESSMENT & PLAN NOTE
- Hgb 8.9 on arrival (baseline 9.2-9.5)  - transferrin trending upward at 126  - iron supplementation initiated   - trend h/h daily

## 2018-10-04 NOTE — PLAN OF CARE
Problem: Patient Care Overview  Goal: Plan of Care Review  Outcome: Ongoing (interventions implemented as appropriate)  POC reviewed with patient.  CMS intact.  Pain to RLE well managed at this time.  Splint in place to RLE, elevated on pillows.  Skin is excessively dry.  Blanchable redness to sacral area - skin otherwise intact.  Currently NPO.  VSS.  Safety maintained.

## 2018-10-04 NOTE — PLAN OF CARE
Primary Doctor No     Payor: BCBS [place] MEDICARE / Plan: BCBS OUT OF STATE MEDICARE ADVANTAGE / Product Type: Medicare Advantage /      Extended Emergency Contact Information  Primary Emergency Contact: Citlaly Ritter   [facility]  Home Phone: [phone]  Relation: Daughter  Secondary Emergency Contact: TAURUS AREVALO  Mobile Phone: [phone]  Relation: Daughter  Preferred language: English   needed? No        10/04/18 1414   Discharge Assessment   Assessment Type Discharge Planning Assessment   Confirmed/corrected address and phone number on facesheet? Yes   Assessment information obtained from? Patient   Communicated expected length of stay with patient/caregiver yes   Prior to hospitilization cognitive status: Alert/Oriented   Prior to hospitalization functional status: Assistive Equipment   Current cognitive status: Alert/Oriented   Current Functional Status: Assistive Equipment;Needs Assistance   Lives With child(nandini), adult   Able to Return to Prior Arrangements unable to determine at this time (comments)   Is patient able to care for self after discharge? Unable to determine at this time (comments)   Patient's perception of discharge disposition home or selfcare;home health   Readmission Within The Last 30 Days no previous admission in last 30 days   Patient currently being followed by outpatient case management? No   Patient currently receives any other outside agency services? No   Equipment Currently Used at Home bedside commode;shower chair;rollator   Do you have any problems affording any of your prescribed medications? No   Is the patient taking medications as prescribed? yes   Does the patient have transportation home? Yes   Transportation Available family or friend will provide   Does the patient receive services at the [facility]? No   Discharge Plan A Home with family;Home Health   Discharge Plan B Skilled Nursing Facility   Patient/Family In Agreement With Plan yes

## 2018-10-04 NOTE — PLAN OF CARE
Problem: Occupational Therapy Goal  Goal: Occupational Therapy Goal  Goals to be met by: 10/18/18     Patient will increase functional independence with ADLs by performing:    UE Dressing with Set-up Assistance and Supervision.  LE Dressing with Minimal Assistance, with modification/AE prn  Grooming while seated with Supervision.  Toileting from bedside commode with Moderate Assistance for hygiene and clothing management.   Toilet transfer to bedside commode with Minimal Assistance.    Outcome: Ongoing (interventions implemented as appropriate)  Goals established  Cathy Lewis, CLYDER

## 2018-10-04 NOTE — ED TRIAGE NOTES
"Pt presents to the ED c./o of right knee pain secondary to fall. Pt states that she was recently d/c from [facility] ED and upon arriving home states "my knees buckled underneath me". Rates knee pain 10/10 states to have taken prescribed tramadol but has had no relief. Denies hitting head, and LOC. Denies any recent trauma to affected knee. Denies any knee surgery. Is currently on plavix. Pt is AAOx4. Name and  checked and verified.  "

## 2018-10-04 NOTE — H&P
[facility] Hospital Medicine  History & Physical    Patient Name: Sheryl Martines  MRN: [id number]  Admission Date: 10/3/2018  Attending Physician: JUAN DAVID Chase MD   Primary Care Provider: Primary Doctor [facility] Medicine Team: [facility] MED  Edwardo Valdez NP     Patient information was obtained from patient and ER records.     Subjective:     Principal Problem:Closed fracture of right tibia and fibula    Chief Complaint:   Chief Complaint   Patient presents with    Fall     trip and fall, right knee pain, denies head injury or LOC, takes plavix        HPI: 62 y/o female, who presents to the ED with R knee pain after fall from standing height.  She has a PMH of CVA, COPD, CAD, HTN, DM, HLD, and asthma.  She states that she was walking to her front step when her knees buckled, and she fell landing on her knees.  She reports using a walker at baseline to assist with ambulation.  She denies LOC or head trauma.  She was in substantial pain while in ED and received 5mg oxycodone IR, 15mg morphine PO, and 6mg morphine IM.  Imaging reveals nondisplaced right proximal tibial / fibular fractures.  She was evaluated by orthopedics while in ED and a splint was applied.     Recently admitted from SNF on 09/[facility]/18 for AMS r/t possible Co2 narcosis, and was discharged home with home health on 10/2/2018.     Past Medical History:   Diagnosis Date    Asthma     Closed compression fracture of fourth lumbar vertebra     COPD (chronic obstructive pulmonary disease)     Coronary artery disease     Diabetes mellitus     Glaucoma     High cholesterol     Hypertension     Iritis     Pulmonary embolus     Stroke     rt sided weakness.       Past Surgical History:   Procedure Laterality Date    ABDOMINAL SURGERY      APPENDECTOMY N/A 8/26/2018    Procedure: APPENDECTOMY;  Surgeon: Bernadine Melendrez MD;  Location: [facility] OR;  Service: General;  Laterality: N/A;    APPENDECTOMY N/A 8/26/2018     Performed by Bernadine Melendrez MD at [facility] OR    APPENDECTOMY, LAPAROSCOPIC---CONVERTED TO OPEN APPENDECTOMY @0950 N/A 8/26/2018    Performed by Bernadine Mel[facility]rez MD at [facility] OR    BACK SURGERY      stimulator    CATARACT EXTRACTION      HYSTERECTOMY      LAPAROSCOPIC APPENDECTOMY N/A 8/26/2018    Procedure: APPENDECTOMY, LAPAROSCOPIC---CONVERTED TO OPEN APPENDECTOMY @0950;  Surgeon: Bernadine Melendrez MD;  Location: [facility] OR;  Service: General;  Laterality: N/A;       Review of patient's allergies indicates:   Allergen Reactions    Ace inhibitors Swelling    Corticosteroids (glucocorticoids)     Hydralazine analogues     Tetracyclines Swelling    Travatan (with benzalkonium) [travoprost (benzalkonium)]        No current facility-administered medications on file prior to encounter.      Current Outpatient Medications on File Prior to Encounter   Medication Sig    traMADol (ULTRAM) 50 mg tablet TK 1 T PO QD PRN    acetaminophen (TYLENOL) 325 MG tablet Take 2 tablets (650 mg total) by mouth every 6 (six) hours as needed for Pain.    albuterol-ipratropium (DUO-NEB) 2.5 mg-0.5 mg/3 mL nebulizer solution Take 3 mLs by nebulization every 4 (four) hours. Rescue    aspirin (ECOTRIN) 81 MG EC tablet Take 1 tablet (81 mg total) by mouth once daily.    cloNIDine (CATAPRES) 0.3 MG tablet Take 1 tablet (0.3 mg total) by mouth 3 (three) times daily.    clopidogrel (PLAVIX) 75 mg tablet Take 75 mg by mouth once daily.    diltiaZEM (CARDIZEM CD) 240 MG 24 hr capsule Take 1 capsule (240 mg total) by mouth once daily.    DULoxetine (CYMBALTA) 60 MG capsule Take 60 mg by mouth once daily.    fluticasone-vilanterol (BREO) 100-25 mcg/dose diskus inhaler Inhale 1 puff into the lungs once daily. Controller    furosemide (LASIX) 40 MG tablet Take 40 mg by mouth once daily.     hydrALAZINE (APRESOLINE) 25 MG tablet Take 1 tablet (25 mg total) by mouth every 8 (eight) hours.    insulin detemir U-100 (LEVEMIR  FLEXTOUCH) 100 unit/mL (3 mL) SubQ InPn pen Inject 24 Units into the skin once daily.    insulin lispro (HUMALOG KWIKPEN) 100 unit/mL InPn pen Inject 3 Units into the skin 3 (three) times daily with meals.    Lactobacillus acidophilus 1 billion cell Cap Take 1 tablet by mouth 2 (two) times daily.    losartan (COZAAR) 100 MG tablet Take 100 mg by mouth once daily.     megestrol (MEGACE) 40 MG Tab Take 40 mg by mouth 2 (two) times daily.    ondansetron (ZOFRAN) 4 MG tablet Take 1 tablet (4 mg total) by mouth every 6 (six) hours as needed.    pantoprazole (PROTONIX) 40 MG tablet Take 40 mg by mouth once daily.    predniSONE (DELTASONE) 10 MG tablet Take 10 mg by mouth once daily.     pyridoxine, vitamin B6, (VITAMIN B-6) 50 MG Tab Take 1 tablet (50 mg total) by mouth once daily.    senna-docusate 8.6-50 mg (SENNA WITH DOCUSATE SODIUM) 8.6-50 mg per tablet Take 1 tablet by mouth 2 (two) times daily.    simethicone 80 mg Tab Take 160 mg by mouth every 6 (six) hours as needed for Flatulence.    simvastatin (ZOCOR) 40 MG tablet Take 0.5 tablets (20 mg total) by mouth every evening.     Family History     Problem Relation (Age of Onset)    Cancer Father    Diabetes Brother    Lupus Sister    Multiple sclerosis Mother        Tobacco Use    Smoking status: Never Smoker   Substance and Sexual Activity    Alcohol use: No     Frequency: Never    Drug use: No    Sexual activity: No     Partners: Male     Review of Systems   Constitutional: Negative for chills and fever.   HENT: Negative for congestion and sore throat.    Eyes: Negative for pain and visual disturbance.   Respiratory: Negative for cough, chest tightness and shortness of breath.    Cardiovascular: Negative for chest pain and palpitations.   Gastrointestinal: Negative for abdominal pain, diarrhea, nausea and vomiting.   Genitourinary: Negative for dysuria and frequency.   Musculoskeletal: Positive for arthralgias, back pain and myalgias.   Skin:  Negative for color change and rash.   Neurological: Negative for seizures and syncope.   Hematological: Bruises/bleeds easily.     Objective:     Vital Signs (Most Recent):  Temp: 98.4 °F (36.9 °C) (10/03/18 1820)  Pulse: 104 (10/04/18 0248)  Resp: 18 (10/04/18 0248)  BP: (!) 143/82 (10/04/18 0245)  SpO2: 98 % (10/04/18 0251) Vital Signs (24h Range):  Temp:  [98.4 °F (36.9 °C)] 98.4 °F (36.9 °C)  Pulse:  [] 104  Resp:  [16-18] 18  SpO2:  [97 %-98 %] 98 %  BP: (130-181)/(80-86) 143/82     Weight: 74.8 kg (165 lb)  Body mass index is 32.22 kg/m².    Physical Exam   Constitutional: She is oriented to person, place, and time. She appears well-developed and well-nourished. No distress.   HENT:   Head: Normocephalic and atraumatic.   Eyes: EOM are normal. Pupils are equal, round, and reactive to light.   Neck: Normal range of motion. Neck supple.   Cardiovascular: Normal rate, regular rhythm, normal heart sounds and intact distal pulses.   Pulmonary/Chest: Effort normal and breath sounds normal. No respiratory distress.   Abdominal: Soft. Bowel sounds are normal. She exhibits no distension. There is no tenderness.   Musculoskeletal: Normal range of motion. She exhibits edema.   Large bulky splint to RLE - sensory / motor intact    Neurological: She is alert and oriented to person, place, and time.   Skin: Skin is warm and dry. She is not diaphoretic.   Multiple areas of ecchymosis    Psychiatric: She has a normal mood and affect. Her behavior is normal.   Nursing note and vitals reviewed.        CRANIAL NERVES     CN III, IV, VI   Pupils are equal, round, and reactive to light.  Extraocular motions are normal.        Significant Labs:   CBC:   Recent Labs   Lab  10/02/18   0522  10/04/18   0302   WBC  7.93  8.85   HGB  9.4*  8.9*   HCT  31.6*  29.4*   PLT  175  180     CMP:   Recent Labs   Lab  10/02/18   0522  10/04/18   0302   NA  140  140   K  3.9  3.6   CL  106  104   CO2  30*  28   GLU  279*  57*   BUN  13  14    CREATININE  0.9  0.8   CALCIUM  8.9  9.5   ANIONGAP  4*  8   EGFRNONAA  >60.0  >60.0       Significant Imaging: I have reviewed all pertinent imaging results/findings within the past 24 hours.     Medical record, imaging, EKG, and medications reviewed     Assessment/Plan:     * Closed fracture of right tibia and fibula    Fall  - presents after fall from standing height - no LOC or head trauma  - utilizes walker at baseline to assist with ambulation   - imaging suggestive of nondisplaced right proximal tibial / fibular fractures  - ortho consulted while in ED  - splint applied to RLE  - NWB to RLE  - PT/OT  - case management to assist with discharge planning   - PRN pain management   - CT R knee pending - f/u on results         History of CVA (cerebrovascular accident)    - hx of pontine stroke (2012)  - continue ASA and clopidogrel   - high risk of fall   - PT/OT        COPD (chronic obstructive pulmonary disease)    - denies SOB / wheezing  - in NAD  - resume home medication regimen   - supplemental oxygen as needed to maintain saturations > 92%  - duo nebs PRN SOB / Wheezing         CAD (coronary artery disease)    - denies CP / SOB  - EKG without obvious acute ischemic changes  - continue ASA and statin   - maintain on telemetry   - trend electrolytes and replete as indicated         Anemia    - Hgb 8.9 on arrival (baseline 9.2-9.5)  - transferrin trending upward at 126  - iron supplementation initiated   - trend h/h daily          Insulin dependent diabetes mellitus    - NPO for now until reassess by Ortho   - hold long acting insulin for now   - ISS        Essential hypertension    - SBP ranging between 130-181  - possibly elevated d/t discomfort  - resume home antihypertensive regimen   - monitor and adjust therapy as indicated         Moderate malnutrition    - most recent albumin 2.5 (10/01)  - prealbumin pending   - NPO until cleared by Ortho   - previous nutrition rec's 09/21/18 encourage po intake,  2000 diabetic / cardiac, Boost breeze TID          VTE Risk Mitigation (From admission, onward)        Ordered     IP VTE HIGH RISK PATIENT  Once      10/04/18 0338     Place MARGIE hose  Until discontinued      10/04/18 0338     Place sequential compression device  Until discontinued      10/04/18 0338             Edwardo Valdez NP  Department of Hospital Medicine   [facility]

## 2018-10-05 LAB
ALLENS TEST: ABNORMAL
ANION GAP SERPL CALC-SCNC: 10 MMOL/L
ANION GAP SERPL CALC-SCNC: 7 MMOL/L
BASOPHILS # BLD AUTO: 0.02 K/UL
BASOPHILS NFR BLD: 0.3 %
BUN SERPL-MCNC: 22 MG/DL
BUN SERPL-MCNC: 23 MG/DL
CALCIUM SERPL-MCNC: 9.2 MG/DL
CALCIUM SERPL-MCNC: 9.5 MG/DL
CHLORIDE SERPL-SCNC: 101 MMOL/L
CHLORIDE SERPL-SCNC: 101 MMOL/L
CO2 SERPL-SCNC: 25 MMOL/L
CO2 SERPL-SCNC: 26 MMOL/L
CREAT SERPL-MCNC: 1.2 MG/DL
CREAT SERPL-MCNC: 1.2 MG/DL
DELSYS: ABNORMAL
DIFFERENTIAL METHOD: ABNORMAL
EOSINOPHIL # BLD AUTO: 0.1 K/UL
EOSINOPHIL NFR BLD: 1.5 %
ERYTHROCYTE [DISTWIDTH] IN BLOOD BY AUTOMATED COUNT: 19.3 %
EST. GFR  (AFRICAN AMERICAN): 55.6 ML/MIN/1.73 M^2
EST. GFR  (AFRICAN AMERICAN): 55.6 ML/MIN/1.73 M^2
EST. GFR  (NON AFRICAN AMERICAN): 48.2 ML/MIN/1.73 M^2
EST. GFR  (NON AFRICAN AMERICAN): 48.2 ML/MIN/1.73 M^2
GLUCOSE SERPL-MCNC: 472 MG/DL
GLUCOSE SERPL-MCNC: 571 MG/DL
HCO3 UR-SCNC: 30.4 MMOL/L (ref 24–28)
HCT VFR BLD AUTO: 29.2 %
HGB BLD-MCNC: 8.9 G/DL
IMM GRANULOCYTES # BLD AUTO: 0.1 K/UL
IMM GRANULOCYTES NFR BLD AUTO: 1.3 %
LYMPHOCYTES # BLD AUTO: 1.7 K/UL
LYMPHOCYTES NFR BLD: 22.4 %
MAGNESIUM SERPL-MCNC: 1.9 MG/DL
MCH RBC QN AUTO: 26.7 PG
MCHC RBC AUTO-ENTMCNC: 30.5 G/DL
MCV RBC AUTO: 88 FL
MONOCYTES # BLD AUTO: 0.4 K/UL
MONOCYTES NFR BLD: 5.7 %
NEUTROPHILS # BLD AUTO: 5.2 K/UL
NEUTROPHILS NFR BLD: 68.8 %
NRBC BLD-RTO: 0 /100 WBC
PCO2 BLDA: 39.7 MMHG (ref 35–45)
PH SMN: 7.49 [PH] (ref 7.35–7.45)
PHOSPHATE SERPL-MCNC: 2.7 MG/DL
PLATELET # BLD AUTO: 207 K/UL
PMV BLD AUTO: 10.6 FL
PO2 BLDA: 77 MMHG (ref 80–100)
POC BE: 7 MMOL/L
POC SATURATED O2: 96 % (ref 95–100)
POC TCO2: 32 MMOL/L (ref 23–27)
POCT GLUCOSE: 153 MG/DL (ref 70–110)
POCT GLUCOSE: 155 MG/DL (ref 70–110)
POCT GLUCOSE: 185 MG/DL (ref 70–110)
POCT GLUCOSE: 479 MG/DL (ref 70–110)
POCT GLUCOSE: 498 MG/DL (ref 70–110)
POCT GLUCOSE: 70 MG/DL (ref 70–110)
POTASSIUM SERPL-SCNC: 4.4 MMOL/L
POTASSIUM SERPL-SCNC: 5.3 MMOL/L
PREALB SERPL-MCNC: 20 MG/DL
RBC # BLD AUTO: 3.33 M/UL
SAMPLE: ABNORMAL
SITE: ABNORMAL
SODIUM SERPL-SCNC: 134 MMOL/L
SODIUM SERPL-SCNC: 136 MMOL/L
WBC # BLD AUTO: 7.56 K/UL

## 2018-10-05 PROCEDURE — 94761 N-INVAS EAR/PLS OXIMETRY MLT: CPT

## 2018-10-05 PROCEDURE — 25000242 PHARM REV CODE 250 ALT 637 W/ HCPCS: Performed by: NURSE PRACTITIONER

## 2018-10-05 PROCEDURE — 99900035 HC TECH TIME PER 15 MIN (STAT)

## 2018-10-05 PROCEDURE — 63600175 PHARM REV CODE 636 W HCPCS: Performed by: NURSE PRACTITIONER

## 2018-10-05 PROCEDURE — 63600175 PHARM REV CODE 636 W HCPCS: Performed by: STUDENT IN AN ORGANIZED HEALTH CARE EDUCATION/TRAINING PROGRAM

## 2018-10-05 PROCEDURE — 84134 ASSAY OF PREALBUMIN: CPT

## 2018-10-05 PROCEDURE — 84100 ASSAY OF PHOSPHORUS: CPT

## 2018-10-05 PROCEDURE — 36600 WITHDRAWAL OF ARTERIAL BLOOD: CPT

## 2018-10-05 PROCEDURE — 25000003 PHARM REV CODE 250

## 2018-10-05 PROCEDURE — 82803 BLOOD GASES ANY COMBINATION: CPT

## 2018-10-05 PROCEDURE — G0378 HOSPITAL OBSERVATION PER HR: HCPCS

## 2018-10-05 PROCEDURE — 83735 ASSAY OF MAGNESIUM: CPT

## 2018-10-05 PROCEDURE — 25000003 PHARM REV CODE 250: Performed by: STUDENT IN AN ORGANIZED HEALTH CARE EDUCATION/TRAINING PROGRAM

## 2018-10-05 PROCEDURE — 63600175 PHARM REV CODE 636 W HCPCS

## 2018-10-05 PROCEDURE — 36415 COLL VENOUS BLD VENIPUNCTURE: CPT

## 2018-10-05 PROCEDURE — 99220 PR INITIAL OBSERVATION CARE,LEVL III: CPT | Mod: GC,,, | Performed by: ORTHOPAEDIC SURGERY

## 2018-10-05 PROCEDURE — 80048 BASIC METABOLIC PNL TOTAL CA: CPT

## 2018-10-05 PROCEDURE — 99226 PR SUBSEQUENT OBSERVATION CARE,LEVEL III: CPT | Mod: GC,,, | Performed by: HOSPITALIST

## 2018-10-05 PROCEDURE — 85025 COMPLETE CBC W/AUTO DIFF WBC: CPT

## 2018-10-05 PROCEDURE — 80048 BASIC METABOLIC PNL TOTAL CA: CPT | Mod: 91

## 2018-10-05 PROCEDURE — 94640 AIRWAY INHALATION TREATMENT: CPT

## 2018-10-05 PROCEDURE — 25000003 PHARM REV CODE 250: Performed by: NURSE PRACTITIONER

## 2018-10-05 RX ORDER — INSULIN ASPART 100 [IU]/ML
6 INJECTION, SOLUTION INTRAVENOUS; SUBCUTANEOUS
Status: DISCONTINUED | OUTPATIENT
Start: 2018-10-05 | End: 2018-10-05

## 2018-10-05 RX ORDER — INSULIN ASPART 100 [IU]/ML
10 INJECTION, SOLUTION INTRAVENOUS; SUBCUTANEOUS ONCE
Status: DISCONTINUED | OUTPATIENT
Start: 2018-10-05 | End: 2018-10-05

## 2018-10-05 RX ORDER — INSULIN ASPART 100 [IU]/ML
10 INJECTION, SOLUTION INTRAVENOUS; SUBCUTANEOUS ONCE
Status: COMPLETED | OUTPATIENT
Start: 2018-10-05 | End: 2018-10-05

## 2018-10-05 RX ORDER — IBUPROFEN 200 MG
16 TABLET ORAL
Status: DISCONTINUED | OUTPATIENT
Start: 2018-10-05 | End: 2018-10-12 | Stop reason: HOSPADM

## 2018-10-05 RX ORDER — FERROUS SULFATE 325(65) MG
325 TABLET, DELAYED RELEASE (ENTERIC COATED) ORAL DAILY
Refills: 0 | COMMUNITY
Start: 2018-10-05 | End: 2018-11-01

## 2018-10-05 RX ORDER — INSULIN ASPART 100 [IU]/ML
7 INJECTION, SOLUTION INTRAVENOUS; SUBCUTANEOUS
Status: DISCONTINUED | OUTPATIENT
Start: 2018-10-06 | End: 2018-10-07

## 2018-10-05 RX ORDER — IBUPROFEN 200 MG
24 TABLET ORAL
Status: DISCONTINUED | OUTPATIENT
Start: 2018-10-05 | End: 2018-10-12 | Stop reason: HOSPADM

## 2018-10-05 RX ORDER — INSULIN ASPART 100 [IU]/ML
8 INJECTION, SOLUTION INTRAVENOUS; SUBCUTANEOUS ONCE
Status: DISCONTINUED | OUTPATIENT
Start: 2018-10-05 | End: 2018-10-05

## 2018-10-05 RX ORDER — OXYCODONE HYDROCHLORIDE 5 MG/1
5 TABLET ORAL EVERY 8 HOURS PRN
Status: DISCONTINUED | OUTPATIENT
Start: 2018-10-05 | End: 2018-10-07

## 2018-10-05 RX ORDER — GLUCAGON 1 MG
1 KIT INJECTION
Status: DISCONTINUED | OUTPATIENT
Start: 2018-10-05 | End: 2018-10-12 | Stop reason: HOSPADM

## 2018-10-05 RX ORDER — INSULIN ASPART 100 [IU]/ML
7 INJECTION, SOLUTION INTRAVENOUS; SUBCUTANEOUS ONCE
Status: COMPLETED | OUTPATIENT
Start: 2018-10-05 | End: 2018-10-05

## 2018-10-05 RX ORDER — INSULIN ASPART 100 [IU]/ML
0-5 INJECTION, SOLUTION INTRAVENOUS; SUBCUTANEOUS
Status: DISCONTINUED | OUTPATIENT
Start: 2018-10-05 | End: 2018-10-12 | Stop reason: HOSPADM

## 2018-10-05 RX ADMIN — SIMVASTATIN 20 MG: 20 TABLET, FILM COATED ORAL at 09:10

## 2018-10-05 RX ADMIN — OXYCODONE HYDROCHLORIDE 5 MG: 5 TABLET ORAL at 04:10

## 2018-10-05 RX ADMIN — FLUTICASONE FUROATE AND VILANTEROL TRIFENATATE 1 PUFF: 100; 25 POWDER RESPIRATORY (INHALATION) at 09:10

## 2018-10-05 RX ADMIN — LOSARTAN POTASSIUM 100 MG: 50 TABLET ORAL at 08:10

## 2018-10-05 RX ADMIN — FUROSEMIDE 40 MG: 40 TABLET ORAL at 08:10

## 2018-10-05 RX ADMIN — ENOXAPARIN SODIUM 40 MG: 100 INJECTION SUBCUTANEOUS at 05:10

## 2018-10-05 RX ADMIN — CLONIDINE HYDROCHLORIDE 0.3 MG: 0.2 TABLET ORAL at 09:10

## 2018-10-05 RX ADMIN — INSULIN DETEMIR 24 UNITS: 100 INJECTION, SOLUTION SUBCUTANEOUS at 01:10

## 2018-10-05 RX ADMIN — PREDNISONE 10 MG: 10 TABLET ORAL at 08:10

## 2018-10-05 RX ADMIN — PANTOPRAZOLE SODIUM 40 MG: 40 TABLET, DELAYED RELEASE ORAL at 08:10

## 2018-10-05 RX ADMIN — FERROUS SULFATE TAB EC 325 MG (65 MG FE EQUIVALENT) 325 MG: 325 (65 FE) TABLET DELAYED RESPONSE at 08:10

## 2018-10-05 RX ADMIN — MEGESTROL ACETATE 40 MG: 40 TABLET ORAL at 08:10

## 2018-10-05 RX ADMIN — MEGESTROL ACETATE 40 MG: 40 TABLET ORAL at 09:10

## 2018-10-05 RX ADMIN — HYDRALAZINE HYDROCHLORIDE 25 MG: 25 TABLET ORAL at 09:10

## 2018-10-05 RX ADMIN — CLONIDINE HYDROCHLORIDE 0.3 MG: 0.2 TABLET ORAL at 08:10

## 2018-10-05 RX ADMIN — KETOROLAC TROMETHAMINE 15 MG: 30 INJECTION, SOLUTION INTRAMUSCULAR at 08:10

## 2018-10-05 RX ADMIN — KETOROLAC TROMETHAMINE 15 MG: 30 INJECTION, SOLUTION INTRAMUSCULAR at 04:10

## 2018-10-05 RX ADMIN — DULOXETINE HYDROCHLORIDE 60 MG: 30 CAPSULE, DELAYED RELEASE ORAL at 08:10

## 2018-10-05 RX ADMIN — INSULIN ASPART 7 UNITS: 100 INJECTION, SOLUTION INTRAVENOUS; SUBCUTANEOUS at 01:10

## 2018-10-05 RX ADMIN — ASPIRIN 81 MG: 81 TABLET, COATED ORAL at 08:10

## 2018-10-05 RX ADMIN — SENNOSIDES AND DOCUSATE SODIUM 1 TABLET: 8.6; 5 TABLET ORAL at 08:10

## 2018-10-05 RX ADMIN — CLOPIDOGREL 75 MG: 75 TABLET, FILM COATED ORAL at 08:10

## 2018-10-05 RX ADMIN — DILTIAZEM HYDROCHLORIDE 240 MG: 240 CAPSULE, COATED, EXTENDED RELEASE ORAL at 08:10

## 2018-10-05 RX ADMIN — HYDRALAZINE HYDROCHLORIDE 25 MG: 25 TABLET ORAL at 01:10

## 2018-10-05 RX ADMIN — CLONIDINE HYDROCHLORIDE 0.3 MG: 0.2 TABLET ORAL at 04:10

## 2018-10-05 RX ADMIN — HYDRALAZINE HYDROCHLORIDE 25 MG: 25 TABLET ORAL at 05:10

## 2018-10-05 RX ADMIN — INSULIN ASPART 10 UNITS: 100 INJECTION, SOLUTION INTRAVENOUS; SUBCUTANEOUS at 04:10

## 2018-10-05 RX ADMIN — IPRATROPIUM BROMIDE AND ALBUTEROL SULFATE 3 ML: .5; 3 SOLUTION RESPIRATORY (INHALATION) at 11:10

## 2018-10-05 RX ADMIN — KETOROLAC TROMETHAMINE 15 MG: 30 INJECTION, SOLUTION INTRAMUSCULAR at 06:10

## 2018-10-05 NOTE — NURSING
Attending MD notified of patient's BG of 479 per blood glucometer machine at 1459. New insulin orders entered in MAR per Dr. Morris. Patient administered 10units of insulin aspart per new MAR orders. No additonal verbal orders at this time. Patient continues to be awake and oriented. No apparent distress or other concerns at this time. Will continue to monitor.

## 2018-10-05 NOTE — PROGRESS NOTES
[facility] Hospital Medicine  Progress Note    Patient Name: Sheryl Martines  MRN: [id number]  Patient Class: OP- Observation   Admission Date: 10/3/2018  Length of Stay: 0 days  Attending Physician: Servando García, *  Primary Care Provider: Primary Doctor [facility] Medicine Team: [facility] 1 Wayne Morris MD    Subjective:     Principal Problem:Closed fracture of right tibia and fibula    HPI:  62 y/o female, who presents to the ED with R knee pain after fall from standing height.  She has a PMH of CVA, COPD, CAD, HTN, DM, HLD, and asthma.  She states that she was walking to her front step when her knees buckled, and she fell landing on her knees.  She reports using a walker at baseline to assist with ambulation.  She denies LOC or head trauma.  She was in substantial pain while in ED and received 5mg oxycodone IR, 15mg morphine PO, and 6mg morphine IM.  Imaging reveals nondisplaced right proximal tibial / fibular fractures.  She was evaluated by orthopedics while in ED and a splint was applied.     Recently admitted from SNF on 09/23/18 for AMS r/t possible Co2 narcosis, and was discharged home with home health on 10/2/2018.     Hospital Course:  Pt D/C home 2 days ago after prolonged stay in SNF and hospital for open appy complicated by infection and CO2 narcosis associated with AMS. Pt was discharged mentating and baseline and without signs of infection. It was recommended she d/c to SNF for rehab and additional care due to her deconditioning but pt was adamant about wanting to return home. Pt knees gave out while ambulating outside her home and collapsed to knees and then falling on her side, resulting in closed, non-displaced proximal fracture of the R tibia and fibula    Knee xray and CT knee demonstrate non-displaced fractures of the proximal tibia and fibula. For non-operative management per ortho. Leg splinted in ED. Other than her new fracture her medical condition is as  per discharge 2 days ago without new issues or deterioration. Awaiting SNF placement    Interval History:  YG pt states she feels well other than leg. hyperglycemic today to 571 on SSI she was admitted on. Bolus with 24U detemir and 7U aspart repeat   90 minutes later. Given additional 10U aspart. Started on regular basal/prandial insulin 20U detemir + 6U aspart ACHS dosing + low dose SSI and will adjust as needed.     Past Medical History:   Diagnosis Date    Asthma     Closed compression fracture of fourth lumbar vertebra     COPD (chronic obstructive pulmonary disease)     Coronary artery disease     Diabetes mellitus     Glaucoma     High cholesterol     Hypertension     Iritis     Pulmonary embolus     Stroke     rt sided weakness.       Past Surgical History:   Procedure Laterality Date    ABDOMINAL SURGERY      APPENDECTOMY N/A 8/26/2018    Procedure: APPENDECTOMY;  Surgeon: Bernadine Melendrez MD;  Location: [facility] OR;  Service: General;  Laterality: N/A;    APPENDECTOMY N/A 8/26/2018    Performed by Bernadine Melendrez MD at [facility] OR    APPENDECTOMY, LAPAROSCOPIC---CONVERTED TO OPEN APPENDECTOMY @0950 N/A 8/26/2018    Performed by Bernadine Melendrez MD at [facility] OR    BACK SURGERY      stimulator    CATARACT EXTRACTION      HYSTERECTOMY      LAPAROSCOPIC APPENDECTOMY N/A 8/26/2018    Procedure: APPENDECTOMY, LAPAROSCOPIC---CONVERTED TO OPEN APPENDECTOMY @0950;  Surgeon: Bernadine Melendrez MD;  Location: [facility] OR;  Service: General;  Laterality: N/A;       Review of patient's allergies indicates:   Allergen Reactions    Ace inhibitors Swelling    Corticosteroids (glucocorticoids)     Hydralazine analogues     Tetracyclines Swelling    Travatan (with benzalkonium) [travoprost (benzalkonium)]        No current facility-administered medications on file prior to encounter.      Current Outpatient Medications on File Prior to Encounter   Medication Sig    [DISCONTINUED] traMADol  (ULTRAM) 50 mg tablet TK 1 T PO QD PRN    acetaminophen (TYLENOL) 325 MG tablet Take 2 tablets (650 mg total) by mouth every 6 (six) hours as needed for Pain.    albuterol-ipratropium (DUO-NEB) 2.5 mg-0.5 mg/3 mL nebulizer solution Take 3 mLs by nebulization every 4 (four) hours. Rescue    aspirin (ECOTRIN) 81 MG EC tablet Take 1 tablet (81 mg total) by mouth once daily.    cloNIDine (CATAPRES) 0.3 MG tablet Take 1 tablet (0.3 mg total) by mouth 3 (three) times daily.    clopidogrel (PLAVIX) 75 mg tablet Take 75 mg by mouth once daily.    diltiaZEM (CARDIZEM CD) 240 MG 24 hr capsule Take 1 capsule (240 mg total) by mouth once daily.    DULoxetine (CYMBALTA) 60 MG capsule Take 60 mg by mouth once daily.    fluticasone-vilanterol (BREO) 100-25 mcg/dose diskus inhaler Inhale 1 puff into the lungs once daily. Controller    furosemide (LASIX) 40 MG tablet Take 40 mg by mouth once daily.     hydrALAZINE (APRESOLINE) 25 MG tablet Take 1 tablet (25 mg total) by mouth every 8 (eight) hours.    insulin detemir U-100 (LEVEMIR FLEXTOUCH) 100 unit/mL (3 mL) SubQ InPn pen Inject 24 Units into the skin once daily.    insulin lispro (HUMALOG KWIKPEN) 100 unit/mL InPn pen Inject 3 Units into the skin 3 (three) times daily with meals.    Lactobacillus acidophilus 1 billion cell Cap Take 1 tablet by mouth 2 (two) times daily.    losartan (COZAAR) 100 MG tablet Take 100 mg by mouth once daily.     megestrol (MEGACE) 40 MG Tab Take 40 mg by mouth 2 (two) times daily.    ondansetron (ZOFRAN) 4 MG tablet Take 1 tablet (4 mg total) by mouth every 6 (six) hours as needed.    pantoprazole (PROTONIX) 40 MG tablet Take 40 mg by mouth once daily.    predniSONE (DELTASONE) 10 MG tablet Take 10 mg by mouth once daily.     pyridoxine, vitamin B6, (VITAMIN B-6) 50 MG Tab Take 1 tablet (50 mg total) by mouth once daily.    senna-docusate 8.6-50 mg (SENNA WITH DOCUSATE SODIUM) 8.6-50 mg per tablet Take 1 tablet by mouth 2 (two)  times daily.    simethicone 80 mg Tab Take 160 mg by mouth every 6 (six) hours as needed for Flatulence.    simvastatin (ZOCOR) 40 MG tablet Take 0.5 tablets (20 mg total) by mouth every evening.     Family History     Problem Relation (Age of Onset)    Cancer Father    Diabetes Brother    Lupus Sister    Multiple sclerosis Mother        Tobacco Use    Smoking status: Never Smoker   Substance and Sexual Activity    Alcohol use: No     Frequency: Never    Drug use: No    Sexual activity: No     Partners: Male     Review of Systems   Constitutional: Negative for chills and fever.   HENT: Negative for congestion and sore throat.    Eyes: Negative for pain and visual disturbance.   Respiratory: Negative for cough, chest tightness and shortness of breath.    Cardiovascular: Negative for chest pain and palpitations.   Gastrointestinal: Negative for abdominal pain, diarrhea, nausea and vomiting.   Genitourinary: Negative for dysuria and frequency.   Musculoskeletal: Positive for arthralgias, back pain and myalgias.   Skin: Negative for color change and rash.   Neurological: Negative for seizures and syncope.   Hematological: Bruises/bleeds easily.     Objective:     Vital Signs (Most Recent):  Temp: 98.4 °F (36.9 °C) (10/05/18 1109)  Pulse: 78 (10/05/18 1526)  Resp: (!) 22 (10/05/18 1128)  BP: 137/63 (10/05/18 1109)  SpO2: 96 % (10/05/18 1224) Vital Signs (24h Range):  Temp:  [97.3 °F (36.3 °C)-99.3 °F (37.4 °C)] 98.4 °F (36.9 °C)  Pulse:  [74-92] 78  Resp:  [18-22] 22  SpO2:  [96 %-100 %] 96 %  BP: (110-142)/(58-68) 137/63     Weight: 74.3 kg (163 lb 12.8 oz)  Body mass index is 31.99 kg/m².    Physical Exam   Constitutional: She is oriented to person, place, and time. She appears well-developed and well-nourished. No distress.   HENT:   Head: Normocephalic and atraumatic.   Eyes: EOM are normal. Pupils are equal, round, and reactive to light.   Neck: Normal range of motion. Neck supple.   Cardiovascular: Normal  rate, regular rhythm, normal heart sounds and intact distal pulses.   Pulmonary/Chest: Effort normal and breath sounds normal. No respiratory distress.   Abdominal: Soft. Bowel sounds are normal. She exhibits no distension. There is no tenderness.   Musculoskeletal: Normal range of motion. She exhibits edema.   Large bulky splint to RLE - sensory / motor intact    Neurological: She is alert and oriented to person, place, and time.   Skin: Skin is warm and dry. She is not diaphoretic.   Multiple areas of ecchymosis    Psychiatric: She has a normal mood and affect. Her behavior is normal.   Nursing note and vitals reviewed.        CRANIAL NERVES     CN III, IV, VI   Pupils are equal, round, and reactive to light.  Extraocular motions are normal.        Significant Labs:   CBC:   Recent Labs   Lab  10/04/18   0302  10/05/18   0443   WBC  8.85  7.56   HGB  8.9*  8.9*   HCT  29.4*  29.2*   PLT  180  207     CMP:   Recent Labs   Lab  10/04/18   0302  10/05/18   1258   NA  140  134*   K  3.6  5.3*   CL  104  101   CO2  28  26   GLU  57*  571*   BUN  14  22   CREATININE  0.8  1.2   CALCIUM  9.5  9.2   ANIONGAP  8  7*   EGFRNONAA  >60.0  48.2*       Significant Imaging: I have reviewed all pertinent imaging results/findings within the past 24 hours.     Medical record, imaging, EKG, and medications reviewed     Review of Systems   Constitutional: Negative for chills, diaphoresis and fever.   Eyes: Negative for photophobia and visual disturbance.   Respiratory: Negative for cough, shortness of breath and wheezing.    Cardiovascular: Negative for palpitations and leg swelling.   Gastrointestinal: Negative for abdominal pain, constipation, diarrhea, nausea and vomiting.   Genitourinary: Negative for dysuria and flank pain.   Musculoskeletal: Positive for gait problem, joint swelling and myalgias. Negative for arthralgias.   Skin: Negative for rash and wound (open appy scar LLQ healing well).   Neurological: Positive for  weakness. Negative for syncope and headaches.   Psychiatric/Behavioral: Negative for agitation, confusion and decreased concentration. The patient is not nervous/anxious.      Objective:     Vital Signs (Most Recent):  Temp: 97.7 °F (36.5 °C) (10/04/18 1605)  Pulse: 76 (10/04/18 1605)  Resp: (!) 22 (10/04/18 1605)  BP: (!) 110/58 (10/04/18 1605)  SpO2: 99 % (10/04/18 1605) Vital Signs (24h Range):  Temp:  [97.7 °F (36.5 °C)-98.7 °F (37.1 °C)] 97.7 °F (36.5 °C)  Pulse:  [] 76  Resp:  [16-22] 22  SpO2:  [96 %-99 %] 99 %  BP: (110-181)/(58-87) 110/58     Weight: 74.3 kg (163 lb 12.8 oz)  Body mass index is 31.99 kg/m².  No intake or output data in the 24 hours ending 10/04/18 1653   Physical Exam   Constitutional: She is oriented to person, place, and time. She appears well-developed and well-nourished. No distress.   AAOx3 but distractible, poor concentration, somnolent   HENT:   Head: Normocephalic and atraumatic.   Eyes: Conjunctivae are normal. No scleral icterus.   Neck: Neck supple. No JVD present. No tracheal deviation present.   Cardiovascular: Normal rate, regular rhythm, normal heart sounds and intact distal pulses. Exam reveals no gallop and no friction rub.   Pulmonary/Chest: Effort normal and breath sounds normal. No stridor. No respiratory distress. She has no wheezes. She has no rales. She exhibits no tenderness.   Abdominal: Soft. Bowel sounds are normal. She exhibits no mass. There is no tenderness. There is no rebound and no guarding.   Musculoskeletal: She exhibits tenderness. She exhibits no edema or deformity.   R lower leg splint in place, leg elevated on pillow.   Neurological: She is alert and oriented to person, place, and time. No cranial nerve deficit or sensory deficit. Coordination normal.   Skin: Skin is warm and dry. She is not diaphoretic.   Psychiatric: She has a normal mood and affect. Her behavior is normal.   Vitals reviewed.      Significant Labs:   CBC:   Recent Labs   Lab   10/04/18   0302   WBC  8.85   HGB  8.9*   HCT  29.4*   PLT  180     CMP:   Recent Labs   Lab  10/04/18   0302   NA  140   K  3.6   CL  104   CO2  28   GLU  57*   BUN  14   CREATININE  0.8   CALCIUM  9.5   ANIONGAP  8   EGFRNONAA  >60.0     Recent Lab Results       10/04/18  1521 10/04/18  1113 10/04/18  0846 10/04/18  0753 10/04/18  0302      Immature Granulocytes     0.9     Immature Grans (Abs)     0.08  Comment:  Mild elevation in immature granulocytes is non specific and   can be seen in a variety of conditions including stress response,   acute inflammation, trauma and pregnancy. Correlation with other   laboratory and clinical findings is essential.       Procalcitonin     0.44  Comment:  Please re-baseline procalcitonin if a patient is transferred from  other facilities to [facility].  A concentration < 0.25 ng/mL represents a low risk bacterial   infection.  Procalcitonin may not be accurate among patients with localized   infection, recent trauma or major surgery, immunosuppressed state,   invasive fungal infection, renal dysfunction. Decisions regarding   initiation or continuation of antibiotic therapy should not be based   solely on procalcitonin levels.       Anion Gap     8     aPTT     <21.0  Comment:  aPTT therapeutic range = 39-69 seconds     Baso #     0.01     Basophil%     0.1     BUN, Bld     14     Calcium     9.5     Chloride     104     CO2     28     Creatinine     0.8     Differential Method     Automated     eGFR if      >60.0     eGFR if non      >60.0  Comment:  Calculation used to obtain the estimated glomerular filtration  rate (eGFR) is the CKD-EPI equation.        Eos #     0.1     Eosinophil%     1.4     Estimated Avg Glucose     157     Glucose     57     Gran # (ANC)     5.4     Gran%     61.3     Group & Rh     A POS     Hematocrit     29.4     Hemoglobin     8.9     Hemoglobin A1C     7.1  Comment:  ADA Screening Guidelines:  5.7-6.4%   Consistent with prediabetes  >or=6.5%  Consistent with diabetes  High levels of fetal hemoglobin interfere with the HbA1C  assay. Heterozygous hemoglobin variants (HbS, HgC, etc)do  not significantly interfere with this assay.   However, presence of multiple variants may affect accuracy.       INDIRECT PIERRE     NEG     Coumadin Monitoring INR     0.9  Comment:  Coumadin Therapy:  2.0 - 3.0 for INR for all indicators except mechanical heart valves  and antiphospholipid syndromes which should use 2.5 - 3.5.       Lymph #     2.5     Lymph%     28.1     MCH     26.3     MCHC     30.3     MCV     87     Mono #     0.7     Mono%     8.2     MPV     10.1     nRBC     0     Platelets     180     POCT Glucose 182 93 83 55      Potassium     3.6     Prealbumin     25     Protime     9.7     RBC     3.38     RDW     19.1     Sodium     140     Transferrin     126     WBC     8.85                     Significant Imaging: I have reviewed all pertinent imaging results/findings within the past 24 hours.    Assessment/Plan:      * Closed fracture of right tibia and fibula    Fall  - presents after fall from standing height following d/c home 2 days ago - no LOC or head trauma  - utilizes walker at baseline to assist with ambulation   - splint applied to RLE  - NWB to RLE  - PT/OT  - case management to assist with discharge planning   - PRN pain management   - CT R knee confirms closed non-displaced fx of proximal tibia and fibula  - non-operative management per Ortho, splint in place  - Pt pending SNF as has proven dangerous to remain at home despite her preference due to deconditioning from previous hospital course in addition to new fracture.        COPD (chronic obstructive pulmonary disease)    - denies SOB / wheezing  - in NAD  - resume home medication regimen   - supplemental oxygen as needed to maintain saturations > 92%  - duo nebs PRN SOB / Wheezing   - Encourage CPAP at night especially on opioids for PRN pain, pt is  reluctant though. Previous CO2 narcosis on admit with AMS requiring bipap        History of CVA (cerebrovascular accident)    - hx of pontine stroke (2012)  - continue ASA and clopidogrel   - high risk of fall   - PT/OT        Anemia    - Hgb 8.9 on arrival (baseline 9.2-9.5)  - transferrin trending upward at 126  - iron supplementation initiated   - trend h/h daily          Moderate malnutrition    - most recent albumin 2.5 (10/01)  - prealbumin pending   - NPO until cleared by Ortho   - previous nutrition rec's 09/21/18 encourage po intake, 2000 diabetic / cardiac, Boost breeze TID        Essential hypertension    - SBP ranging between 130-181  - possibly elevated d/t discomfort  - resume home antihypertensive regimen   - monitor and adjust therapy as indicated         CAD (coronary artery disease)    - denies CP / SOB  - EKG without obvious acute ischemic changes  - continue ASA and statin   - maintain on telemetry   - trend electrolytes and replete as indicated         Insulin dependent diabetes mellitus    24U detemir + 4U aspart with meals on prior discharge  BGL to 572 today but only SSI throughout the day  Given 24U detemir and 7U aspart -->  ~90mins later -> additional 10U lantus  Scheduled 20U detemir + 6U aspart ACHS + low dose SSI, will adjust as needed  q4h POC until BGLs stable          VTE Risk Mitigation (From admission, onward)        Ordered     enoxaparin injection 40 mg  Daily      10/04/18 1022     IP VTE HIGH RISK PATIENT  Once      10/04/18 0338     Place MARGIE hose  Until discontinued      10/04/18 0338     Place sequential compression device  Until discontinued      10/04/18 0338              Wayne Morris MD  Department of Hospital Medicine   [facility]

## 2018-10-05 NOTE — NURSING
Patient has new onset on SOB. VSS. Patient is stating 96% on RA. Patient's daughter states that patient is a CO2 retainer and supplemental O2 does not help her SOB. Daughter request that a PRN breathing treatment be given and blood ABG's drawn to check C02 levels. This RN notified the attending team of the above and orders for blood gas draw requested. This RN also notified respiratory therapy of need for PRN breathing treatment, and will come to bedside. At this time patient is stable, and reports no distress. Will review MD orders and continue to monitor.

## 2018-10-05 NOTE — PLAN OF CARE
Problem: Patient Care Overview  Goal: Plan of Care Review  Outcome: Ongoing (interventions implemented as appropriate)  POC reviewed with patient.  No acute events overnight. CMS intact.  Pain well managed.  VSS. Turned q2hr.  Safety maintained.

## 2018-10-05 NOTE — ASSESSMENT & PLAN NOTE
- denies SOB / wheezing  - in NAD  - resume home medication regimen   - supplemental oxygen as needed to maintain saturations > 92%  - duo nebs PRN SOB / Wheezing   - Encourage CPAP at night especially on opioids for PRN pain, pt is reluctant though. Previous CO2 narcosis on admit with AMS requiring bipap

## 2018-10-05 NOTE — SUBJECTIVE & OBJECTIVE
Principal Problem:Closed fracture of right tibia and fibula    Principal Orthopedic Problem: nondisplaced R proximal tibia fx     Interval History: Pt doing well. Pain controlled. In long leg splint    Review of patient's allergies indicates:   Allergen Reactions    Ace inhibitors Swelling    Corticosteroids (glucocorticoids)     Hydralazine analogues     Tetracyclines Swelling    Travatan (with benzalkonium) [travoprost (benzalkonium)]        Current Facility-Administered Medications   Medication    acetaminophen tablet 650 mg    albuterol-ipratropium 2.5 mg-0.5 mg/3 mL nebulizer solution 3 mL    aspirin EC tablet 81 mg    calcium carbonate 200 mg calcium (500 mg) chewable tablet 500 mg    cloNIDine tablet 0.3 mg    clopidogrel tablet 75 mg    dextrose 50% injection 12.5 g    dextrose 50% injection 25 g    diltiaZEM 24 hr capsule 240 mg    diphenhydrAMINE injection 12.5 mg    DULoxetine DR capsule 60 mg    enoxaparin injection 40 mg    ferrous sulfate EC tablet 325 mg    fluticasone-vilanterol 100-25 mcg/dose diskus inhaler 1 puff    furosemide tablet 40 mg    glucagon (human recombinant) injection 1 mg    glucose chewable tablet 16 g    glucose chewable tablet 24 g    hydrALAZINE tablet 25 mg    insulin aspart U-100 pen 0-5 Units    insulin aspart U-100 pen 6 Units    [START ON 10/6/2018] insulin detemir U-100 pen 20 Units    ketorolac injection 15 mg    losartan tablet 100 mg    megestrol tablet 40 mg    ondansetron disintegrating tablet 8 mg    ondansetron injection 4 mg    oxyCODONE immediate release tablet 5 mg    pantoprazole EC tablet 40 mg    predniSONE tablet 10 mg    ramelteon tablet 8 mg    senna-docusate 8.6-50 mg per tablet 1 tablet    simethicone chewable tablet 80 mg    simvastatin tablet 20 mg    sodium chloride 0.9% flush 5 mL     Objective:     Vital Signs (Most Recent):  Temp: 98.4 °F (36.9 °C) (10/05/18 1109)  Pulse: 87 (10/05/18 1128)  Resp: (!) 22  (10/05/18 1128)  BP: 137/63 (10/05/18 1109)  SpO2: 96 % (10/05/18 1224) Vital Signs (24h Range):  Temp:  [97.3 °F (36.3 °C)-99.3 °F (37.4 °C)] 98.4 °F (36.9 °C)  Pulse:  [74-92] 87  Resp:  [18-22] 22  SpO2:  [96 %-100 %] 96 %  BP: (110-142)/(58-68) 137/63     Weight: 74.3 kg (163 lb 12.8 oz)  Height: 5' (152.4 cm)  Body mass index is 31.99 kg/m².      Intake/Output Summary (Last 24 hours) at 10/5/2018 1402  Last data filed at 10/5/2018 0200  Gross per 24 hour   Intake 300 ml   Output --   Net 300 ml       Ortho/SPM Exam   Long leg splint in place  NVI distally     Significant Labs: All pertinent labs within the past 24 hours have been reviewed.    Significant Imaging: I have reviewed and interpreted all pertinent imaging results/findings.     CT with no intrarticular extension

## 2018-10-05 NOTE — PROGRESS NOTES
[facility]  Orthopedics  Progress Note    Patient Name: Sheryl Martines  MRN: [id number]  Admission Date: 10/3/2018  Hospital Length of Stay: 0 days  Attending Provider: Servando García, *  Primary Care Provider: Primary Doctor No    Subjective:     Principal Problem:Closed fracture of right tibia and fibula    Principal Orthopedic Problem: nondisplaced R proximal tibia fx     Interval History: Pt doing well. Pain controlled. In long leg splint    Review of patient's allergies indicates:   Allergen Reactions    Ace inhibitors Swelling    Corticosteroids (glucocorticoids)     Hydralazine analogues     Tetracyclines Swelling    Travatan (with benzalkonium) [travoprost (benzalkonium)]        Current Facility-Administered Medications   Medication    acetaminophen tablet 650 mg    albuterol-ipratropium 2.5 mg-0.5 mg/3 mL nebulizer solution 3 mL    aspirin EC tablet 81 mg    calcium carbonate 200 mg calcium (500 mg) chewable tablet 500 mg    cloNIDine tablet 0.3 mg    clopidogrel tablet 75 mg    dextrose 50% injection 12.5 g    dextrose 50% injection 25 g    diltiaZEM 24 hr capsule 240 mg    diphenhydrAMINE injection 12.5 mg    DULoxetine DR capsule 60 mg    enoxaparin injection 40 mg    ferrous sulfate EC tablet 325 mg    fluticasone-vilanterol 100-25 mcg/dose diskus inhaler 1 puff    furosemide tablet 40 mg    glucagon (human recombinant) injection 1 mg    glucose chewable tablet 16 g    glucose chewable tablet 24 g    hydrALAZINE tablet 25 mg    insulin aspart U-100 pen 0-5 Units    insulin aspart U-100 pen 6 Units    [START ON 10/6/2018] insulin detemir U-100 pen 20 Units    ketorolac injection 15 mg    losartan tablet 100 mg    megestrol tablet 40 mg    ondansetron disintegrating tablet 8 mg    ondansetron injection 4 mg    oxyCODONE immediate release tablet 5 mg    pantoprazole EC tablet 40 mg    predniSONE tablet 10 mg    ramelteon tablet 8 mg     senna-docusate 8.6-50 mg per tablet 1 tablet    simethicone chewable tablet 80 mg    simvastatin tablet 20 mg    sodium chloride 0.9% flush 5 mL     Objective:     Vital Signs (Most Recent):  Temp: 98.4 °F (36.9 °C) (10/05/18 1109)  Pulse: 87 (10/05/18 1128)  Resp: (!) 22 (10/05/18 1128)  BP: 137/63 (10/05/18 1109)  SpO2: 96 % (10/05/18 1224) Vital Signs (24h Range):  Temp:  [97.3 °F (36.3 °C)-99.3 °F (37.4 °C)] 98.4 °F (36.9 °C)  Pulse:  [74-92] 87  Resp:  [18-22] 22  SpO2:  [96 %-100 %] 96 %  BP: (110-142)/(58-68) 137/63     Weight: 74.3 kg (163 lb 12.8 oz)  Height: 5' (152.4 cm)  Body mass index is 31.99 kg/m².      Intake/Output Summary (Last 24 hours) at 10/5/2018 1402  Last data filed at 10/5/2018 0200  Gross per 24 hour   Intake 300 ml   Output --   Net 300 ml       Ortho/SPM Exam   Long leg splint in place  NVI distally     Significant Labs: All pertinent labs within the past 24 hours have been reviewed.    Significant Imaging: I have reviewed and interpreted all pertinent imaging results/findings.     CT with no intrarticular extension    Assessment/Plan:     * Closed fracture of right tibia and fibula    Sheryl Martines is a 63 y.o. female with Right non displaced fracture of proximal tibia/fibula   - Admitted to medicine   - long leg splint  - NWB RLE  - will attempt nonop management of this fracture with immobilization in a long leg splint.   - dispo: pending PT placement                Shreya Carreno MD  Orthopedics  [facility]

## 2018-10-05 NOTE — SUBJECTIVE & OBJECTIVE
Interval History:  NAEO, pt states she feels well other than leg. hyperglycemic today to 571 on SSI she was admitted on. Bolus with 24U detemir and 7U aspart repeat   90 minutes later. Given additional 10U aspart. Started on regular basal/prandial insulin 20U detemir + 6U aspart ACHS dosing + low dose SSI and will adjust as needed.     Past Medical History:   Diagnosis Date    Asthma     Closed compression fracture of fourth lumbar vertebra     COPD (chronic obstructive pulmonary disease)     Coronary artery disease     Diabetes mellitus     Glaucoma     High cholesterol     Hypertension     Iritis     Pulmonary embolus     Stroke     rt sided weakness.       Past Surgical History:   Procedure Laterality Date    ABDOMINAL SURGERY      APPENDECTOMY N/A 8/26/2018    Procedure: APPENDECTOMY;  Surgeon: Bernadine Melendrez MD;  Location: [facility] OR;  Service: General;  Laterality: N/A;    APPENDECTOMY N/A 8/26/2018    Performed by Bernadine Melendrez MD at [facility] OR    APPENDECTOMY, LAPAROSCOPIC---CONVERTED TO OPEN APPENDECTOMY @0950 N/A 8/26/2018    Performed by Bernadine Melendrez MD at [facility] OR    BACK SURGERY      stimulator    CATARACT EXTRACTION      HYSTERECTOMY      LAPAROSCOPIC APPENDECTOMY N/A 8/26/2018    Procedure: APPENDECTOMY, LAPAROSCOPIC---CONVERTED TO OPEN APPENDECTOMY @0950;  Surgeon: Bernadine Melendrez MD;  Location: [facility] OR;  Service: General;  Laterality: N/A;       Review of patient's allergies indicates:   Allergen Reactions    Ace inhibitors Swelling    Corticosteroids (glucocorticoids)     Hydralazine analogues     Tetracyclines Swelling    Travatan (with benzalkonium) [travoprost (benzalkonium)]        No current facility-administered medications on file prior to encounter.      Current Outpatient Medications on File Prior to Encounter   Medication Sig    acetaminophen (TYLENOL) 325 MG tablet Take 2 tablets (650 mg total) by mouth every 6 (six) hours as needed for  Pain.    albuterol-ipratropium (DUO-NEB) 2.5 mg-0.5 mg/3 mL nebulizer solution Take 3 mLs by nebulization every 4 (four) hours. Rescue    aspirin (ECOTRIN) 81 MG EC tablet Take 1 tablet (81 mg total) by mouth once daily.    cloNIDine (CATAPRES) 0.3 MG tablet Take 1 tablet (0.3 mg total) by mouth 3 (three) times daily.    clopidogrel (PLAVIX) 75 mg tablet Take 75 mg by mouth once daily.    diltiaZEM (CARDIZEM CD) 240 MG 24 hr capsule Take 1 capsule (240 mg total) by mouth once daily.    DULoxetine (CYMBALTA) 60 MG capsule Take 60 mg by mouth once daily.    fluticasone-vilanterol (BREO) 100-25 mcg/dose diskus inhaler Inhale 1 puff into the lungs once daily. Controller    furosemide (LASIX) 40 MG tablet Take 40 mg by mouth once daily.     hydrALAZINE (APRESOLINE) 25 MG tablet Take 1 tablet (25 mg total) by mouth every 8 (eight) hours.    insulin detemir U-100 (LEVEMIR FLEXTOUCH) 100 unit/mL (3 mL) SubQ InPn pen Inject 24 Units into the skin once daily.    insulin lispro (HUMALOG KWIKPEN) 100 unit/mL InPn pen Inject 3 Units into the skin 3 (three) times daily with meals.    Lactobacillus acidophilus 1 billion cell Cap Take 1 tablet by mouth 2 (two) times daily.    losartan (COZAAR) 100 MG tablet Take 100 mg by mouth once daily.     megestrol (MEGACE) 40 MG Tab Take 40 mg by mouth 2 (two) times daily.    ondansetron (ZOFRAN) 4 MG tablet Take 1 tablet (4 mg total) by mouth every 6 (six) hours as needed.    pantoprazole (PROTONIX) 40 MG tablet Take 40 mg by mouth once daily.    predniSONE (DELTASONE) 10 MG tablet Take 10 mg by mouth once daily.     pyridoxine, vitamin B6, (VITAMIN B-6) 50 MG Tab Take 1 tablet (50 mg total) by mouth once daily.    senna-docusate 8.6-50 mg (SENNA WITH DOCUSATE SODIUM) 8.6-50 mg per tablet Take 1 tablet by mouth 2 (two) times daily.    simethicone 80 mg Tab Take 160 mg by mouth every 6 (six) hours as needed for Flatulence.    simvastatin (ZOCOR) 40 MG tablet Take 0.5  tablets (20 mg total) by mouth every evening.     Family History     Problem Relation (Age of Onset)    Cancer Father    Diabetes Brother    Lupus Sister    Multiple sclerosis Mother        Tobacco Use    Smoking status: Never Smoker   Substance and Sexual Activity    Alcohol use: No     Frequency: Never    Drug use: No    Sexual activity: No     Partners: Male     Review of Systems   Constitutional: Negative for chills and fever.   HENT: Negative for congestion and sore throat.    Eyes: Negative for pain and visual disturbance.   Respiratory: Negative for cough, chest tightness and shortness of breath.    Cardiovascular: Negative for chest pain and palpitations.   Gastrointestinal: Negative for abdominal pain, diarrhea, nausea and vomiting.   Genitourinary: Negative for dysuria and frequency.   Musculoskeletal: Positive for arthralgias, back pain and myalgias.   Skin: Negative for color change and rash.   Neurological: Negative for seizures and syncope.   Hematological: Bruises/bleeds easily.     Objective:     Vital Signs (Most Recent):  Temp: 98.3 °F (36.8 °C) (10/06/18 0817)  Pulse: 70 (10/06/18 0817)  Resp: 20 (10/06/18 0817)  BP: (!) 143/78 (10/06/18 0817)  SpO2: 99 % (10/06/18 0817) Vital Signs (24h Range):  Temp:  [96.2 °F (35.7 °C)-98.4 °F (36.9 °C)] 98.3 °F (36.8 °C)  Pulse:  [70-87] 70  Resp:  [16-22] 20  SpO2:  [96 %-99 %] 99 %  BP: (135-149)/(63-82) 143/78     Weight: 74.3 kg (163 lb 12.8 oz)  Body mass index is 31.99 kg/m².    Physical Exam   Constitutional: She is oriented to person, place, and time. She appears well-developed and well-nourished. No distress.   HENT:   Head: Normocephalic and atraumatic.   Eyes: EOM are normal. Pupils are equal, round, and reactive to light.   Neck: Normal range of motion. Neck supple.   Cardiovascular: Normal rate, regular rhythm, normal heart sounds and intact distal pulses.   Pulmonary/Chest: Effort normal and breath sounds normal. No respiratory distress.    Abdominal: Soft. Bowel sounds are normal. She exhibits no distension. There is no tenderness.   Musculoskeletal: Normal range of motion. She exhibits edema.   Large bulky splint to RLE - sensory / motor intact    Neurological: She is alert and oriented to person, place, and time.   Skin: Skin is warm and dry. She is not diaphoretic.   Multiple areas of ecchymosis    Psychiatric: She has a normal mood and affect. Her behavior is normal.   Nursing note and vitals reviewed.        CRANIAL NERVES     CN III, IV, VI   Pupils are equal, round, and reactive to light.  Extraocular motions are normal.        Significant Labs:   Recent Results (from the past 24 hour(s))   ISTAT PROCEDURE    Collection Time: 10/05/18 12:24 PM   Result Value Ref Range    POC PH 7.491 (H) 7.35 - 7.45    POC PCO2 39.7 35 - 45 mmHg    POC PO2 77 (L) 80 - 100 mmHg    POC HCO3 30.4 (H) 24 - 28 mmol/L    POC BE 7 -2 to 2 mmol/L    POC SATURATED O2 96 95 - 100 %    POC TCO2 32 (H) 23 - 27 mmol/L    Sample ARTERIAL     Site LR     Allens Test Pass     DelSys Room Air    POCT glucose    Collection Time: 10/05/18 12:47 PM   Result Value Ref Range    POCT Glucose 498 (HH) 70 - 110 mg/dL   Basic Metabolic Panel (BMP)    Collection Time: 10/05/18 12:58 PM   Result Value Ref Range    Sodium 134 (L) 136 - 145 mmol/L    Potassium 5.3 (H) 3.5 - 5.1 mmol/L    Chloride 101 95 - 110 mmol/L    CO2 26 23 - 29 mmol/L    Glucose 571 (HH) 70 - 110 mg/dL    BUN, Bld 22 8 - 23 mg/dL    Creatinine 1.2 0.5 - 1.4 mg/dL    Calcium 9.2 8.7 - 10.5 mg/dL    Anion Gap 7 (L) 8 - 16 mmol/L    eGFR if African American 55.6 (A) >60 mL/min/1.73 m^2    eGFR if non  48.2 (A) >60 mL/min/1.73 m^2   Basic metabolic panel    Collection Time: 10/05/18  2:44 PM   Result Value Ref Range    Sodium 136 136 - 145 mmol/L    Potassium 4.4 3.5 - 5.1 mmol/L    Chloride 101 95 - 110 mmol/L    CO2 25 23 - 29 mmol/L    Glucose 472 (HH) 70 - 110 mg/dL    BUN, Bld 23 8 - 23 mg/dL     Creatinine 1.2 0.5 - 1.4 mg/dL    Calcium 9.5 8.7 - 10.5 mg/dL    Anion Gap 10 8 - 16 mmol/L    eGFR if African American 55.6 (A) >60 mL/min/1.73 m^2    eGFR if non  48.2 (A) >60 mL/min/1.73 m^2   POCT glucose    Collection Time: 10/05/18  2:59 PM   Result Value Ref Range    POCT Glucose 479 (HH) 70 - 110 mg/dL   POCT glucose    Collection Time: 10/05/18  6:14 PM   Result Value Ref Range    POCT Glucose 153 (H) 70 - 110 mg/dL   POCT glucose    Collection Time: 10/05/18  9:08 PM   Result Value Ref Range    POCT Glucose 70 70 - 110 mg/dL   POCT glucose    Collection Time: 10/05/18 10:47 PM   Result Value Ref Range    POCT Glucose 185 (H) 70 - 110 mg/dL   POCT glucose    Collection Time: 10/05/18 11:21 PM   Result Value Ref Range    POCT Glucose 179 (H) 70 - 110 mg/dL   POCT glucose    Collection Time: 10/06/18  1:05 AM   Result Value Ref Range    POCT Glucose 147 (H) 70 - 110 mg/dL   CBC with Automated Differential    Collection Time: 10/06/18  4:14 AM   Result Value Ref Range    WBC 7.20 3.90 - 12.70 K/uL    RBC 2.99 (L) 4.00 - 5.40 M/uL    Hemoglobin 8.0 (L) 12.0 - 16.0 g/dL    Hematocrit 25.9 (L) 37.0 - 48.5 %    MCV 87 82 - 98 fL    MCH 26.8 (L) 27.0 - 31.0 pg    MCHC 30.9 (L) 32.0 - 36.0 g/dL    RDW 19.3 (H) 11.5 - 14.5 %    Platelets 213 150 - 350 K/uL    MPV 10.2 9.2 - 12.9 fL    Immature Granulocytes 0.8 (H) 0.0 - 0.5 %    Gran # (ANC) 5.1 1.8 - 7.7 K/uL    Immature Grans (Abs) 0.06 (H) 0.00 - 0.04 K/uL    Lymph # 1.5 1.0 - 4.8 K/uL    Mono # 0.4 0.3 - 1.0 K/uL    Eos # 0.1 0.0 - 0.5 K/uL    Baso # 0.01 0.00 - 0.20 K/uL    nRBC 0 0 /100 WBC    Gran% 70.7 38.0 - 73.0 %    Lymph% 21.0 18.0 - 48.0 %    Mono% 6.1 4.0 - 15.0 %    Eosinophil% 1.3 0.0 - 8.0 %    Basophil% 0.1 0.0 - 1.9 %    Differential Method Automated      Medical record, imaging, EKG, and medications reviewed     Significant Imaging: I have reviewed all pertinent imaging results/findings within the past 24 hours.    Review of  Systems   Constitutional: Negative for chills, diaphoresis and fever.   Eyes: Negative for photophobia and visual disturbance.   Respiratory: Negative for cough, shortness of breath and wheezing.    Cardiovascular: Negative for palpitations and leg swelling.   Gastrointestinal: Negative for abdominal pain, constipation, diarrhea, nausea and vomiting.   Genitourinary: Negative for dysuria and flank pain.   Musculoskeletal: Positive for gait problem, joint swelling and myalgias. Negative for arthralgias.   Skin: Negative for rash and wound (open appy scar LLQ healing well).   Neurological: Positive for weakness. Negative for syncope and headaches.   Psychiatric/Behavioral: Negative for agitation, confusion and decreased concentration. The patient is not nervous/anxious.        Objective:     Vital Signs (Most Recent):  Temp: 98.3 °F (36.8 °C) (10/05/18 1616)  Pulse: 75 (10/05/18 1616)  Resp: 18 (10/05/18 1616)  BP: 135/67 (10/05/18 1616)  SpO2: 99 % (10/05/18 1616) Vital Signs (24h Range):  Temp:  [97.3 °F (36.3 °C)-99.3 °F (37.4 °C)] 98.3 °F (36.8 °C)  Pulse:  [74-92] 75  Resp:  [18-22] 18  SpO2:  [96 %-100 %] 99 %  BP: (131-142)/(62-68) 135/67     Weight: 74.3 kg (163 lb 12.8 oz)  Body mass index is 31.99 kg/m².    Intake/Output Summary (Last 24 hours) at 10/5/2018 1830  Last data filed at 10/5/2018 1644  Gross per 24 hour   Intake 300 ml   Output 1 ml   Net 299 ml      Physical Exam    Constitutional: She is oriented to person, place, and time. She appears well-developed and well-nourished. No distress.   AAOx3 but distractible, poor concentration, somnolent   HENT:   Head: Normocephalic and atraumatic.   Eyes: Conjunctivae are normal. No scleral icterus.   Neck: Neck supple. No JVD present. No tracheal deviation present.   Cardiovascular: Normal rate, regular rhythm, normal heart sounds and intact distal pulses. Exam reveals no gallop and no friction rub.   Pulmonary/Chest: Effort normal and breath sounds normal.  No stridor. No respiratory distress. She has no wheezes. She has no rales. She exhibits no tenderness.   Abdominal: Soft. Bowel sounds are normal. She exhibits no mass. There is no tenderness. There is no rebound and no guarding.   Musculoskeletal: She exhibits tenderness. She exhibits no edema or deformity.   R lower leg splint in place, leg elevated on pillow.   Neurological: She is alert and oriented to person, place, and time. No cranial nerve deficit or sensory deficit. Coordination normal.   Skin: Skin is warm and dry. She is not diaphoretic.   Psychiatric: She has a normal mood and affect. Her behavior is normal.   Vitals reviewed.

## 2018-10-05 NOTE — ASSESSMENT & PLAN NOTE
Sheryl Martines is a 63 y.o. female with Right non displaced fracture of proximal tibia/fibula   - Admitted to medicine   - long leg splint  - NWB RLE  - will attempt nonop management of this fracture with immobilization in a long leg splint.   - dispo: pending PT placement

## 2018-10-05 NOTE — NURSING
Attending MD notified of patient's BG of >500 per blood glucose machine at 1330. MD notified of the above. No verbal orders or additional orders received for insulin at this time. Spoke to Dr. Morris and he would like patient's blood glucose to be re-checked at 1500. Patient is asymptomatic and alert and oriented. No other concerns at this time. Will continue to monitor.

## 2018-10-05 NOTE — ASSESSMENT & PLAN NOTE
Fall  - presents after fall from standing height following d/c home 2 days ago - no LOC or head trauma  - utilizes walker at baseline to assist with ambulation   - splint applied to RLE  - NWB to RLE  - PT/OT  - case management to assist with discharge planning   - PRN pain management   - CT R knee confirms closed non-displaced fx of proximal tibia and fibula  - non-operative management per Ortho, splint in place  - Pt pending SNF as has proven dangerous to remain at home despite her preference due to deconditioning from previous hospital course in addition to new fracture.

## 2018-10-05 NOTE — ASSESSMENT & PLAN NOTE
24U detemir + 4U aspart with meals on prior discharge  BGL to 572 today but only SSI throughout the day  Given 24U detemir and 7U aspart -->  ~90mins later -> additional 10U lantus  Scheduled 20U detemir + 6U aspart ACHS + low dose SSI, will adjust as needed  q4h POC until BGLs stable

## 2018-10-05 NOTE — PHARMACY MED REC
"Admission Medication Reconciliation - Pharmacy Consult Note    The home medication history was taken by Zuri Joyce, Pharmacy Technician.  Based on information gathered and subsequent review by the clinical pharmacist, the items below may need attention.    You may go to "Admission" then "Reconcile Home Medications" tabs to review and/or act upon these items.    No issues noted with the medication reconciliation.    Please address this information as you see fit.  Feel free to contact us if you have any questions or require assistance.    Lalitha Huerta, PharmD, BCPS  [id number]    [facility] Medications   Medication Sig    acetaminophen (TYLENOL) 325 MG tablet Take 2 tablets (650 mg total) by mouth every 6 (six) hours as needed for Pain.    albuterol-ipratropium (DUO-NEB) 2.5 mg-0.5 mg/3 mL nebulizer solution Take 3 mLs by nebulization every 4 (four) hours. Rescue    aspirin (ECOTRIN) 81 MG EC tablet Take 1 tablet (81 mg total) by mouth once daily.    cloNIDine (CATAPRES) 0.3 MG tablet Take 1 tablet (0.3 mg total) by mouth 3 (three) times daily.    clopidogrel (PLAVIX) 75 mg tablet Take 75 mg by mouth once daily.    diltiaZEM (CARDIZEM CD) 240 MG 24 hr capsule Take 1 capsule (240 mg total) by mouth once daily.    DULoxetine (CYMBALTA) 60 MG capsule Take 60 mg by mouth once daily.    fluticasone-vilanterol (BREO) 100-25 mcg/dose diskus inhaler Inhale 1 puff into the lungs once daily. Controller    furosemide (LASIX) 40 MG tablet Take 40 mg by mouth once daily.     hydrALAZINE (APRESOLINE) 25 MG tablet Take 1 tablet (25 mg total) by mouth every 8 (eight) hours.    insulin detemir U-100 (LEVEMIR FLEXTOUCH) 100 unit/mL (3 mL) SubQ InPn pen Inject 24 Units into the skin once daily.    insulin lispro (HUMALOG KWIKPEN) 100 unit/mL InPn pen Inject 3 Units into the skin 3 (three) times daily with meals.    Lactobacillus acidophilus 1 billion cell Cap Take 1 tablet by mouth 2 (two) times daily.    losartan (COZAAR) 100 " MG tablet Take 100 mg by mouth once daily.     megestrol (MEGACE) 40 MG Tab Take 40 mg by mouth 2 (two) times daily.    ondansetron (ZOFRAN) 4 MG tablet Take 1 tablet (4 mg total) by mouth every 6 (six) hours as needed.    pantoprazole (PROTONIX) 40 MG tablet Take 40 mg by mouth once daily.    predniSONE (DELTASONE) 10 MG tablet Take 10 mg by mouth once daily.     pyridoxine, vitamin B6, (VITAMIN B-6) 50 MG Tab Take 1 tablet (50 mg total) by mouth once daily.    senna-docusate 8.6-50 mg (SENNA WITH DOCUSATE SODIUM) 8.6-50 mg per tablet Take 1 tablet by mouth 2 (two) times daily.    simethicone 80 mg Tab Take 160 mg by mouth every 6 (six) hours as needed for Flatulence.    simvastatin (ZOCOR) 40 MG tablet Take 0.5 tablets (20 mg total) by mouth every evening.    [DISCONTINUED] traMADol (ULTRAM) 50 mg tablet TK 1 T PO QD PRN     .    .

## 2018-10-05 NOTE — PLAN OF CARE
Problem: Fall Risk (Adult)  Goal: Identify Related Risk Factors and Signs and Symptoms  Related risk factors and signs and symptoms are identified upon initiation of Human Response Clinical Practice Guideline (CPG)  Outcome: Ongoing (interventions implemented as appropriate)   10/05/18 1830   Fall Risk   Related Risk Factors (Fall Risk) environment unfamiliar;slipper/uneven surfaces;polypharmacy;inadequate lighting;gait/mobility problems     Fall precautions in place. No falls occurred during this shift.     Problem: Pressure Ulcer Risk (Finn Scale) (Adult,Obstetrics,Pediatric)  Goal: Identify Related Risk Factors and Signs and Symptoms  Related risk factors and signs and symptoms are identified upon initiation of Human Response Clinical Practice Guideline (CPG)  Outcome: Ongoing (interventions implemented as appropriate)   10/05/18 1830   Pressure Ulcer Risk (Finn Scale)   Related Risk Factors (Pressure Ulcer Risk (Finn Scale)) mobility impaired     Patient turned q.2hours and position adjusted as needed. No new skin concerns this shift.

## 2018-10-06 LAB
BASOPHILS # BLD AUTO: 0.01 K/UL
BASOPHILS NFR BLD: 0.1 %
DIFFERENTIAL METHOD: ABNORMAL
EOSINOPHIL # BLD AUTO: 0.1 K/UL
EOSINOPHIL NFR BLD: 1.3 %
ERYTHROCYTE [DISTWIDTH] IN BLOOD BY AUTOMATED COUNT: 19.3 %
HCT VFR BLD AUTO: 25.9 %
HGB BLD-MCNC: 8 G/DL
IMM GRANULOCYTES # BLD AUTO: 0.06 K/UL
IMM GRANULOCYTES NFR BLD AUTO: 0.8 %
LYMPHOCYTES # BLD AUTO: 1.5 K/UL
LYMPHOCYTES NFR BLD: 21 %
MCH RBC QN AUTO: 26.8 PG
MCHC RBC AUTO-ENTMCNC: 30.9 G/DL
MCV RBC AUTO: 87 FL
MONOCYTES # BLD AUTO: 0.4 K/UL
MONOCYTES NFR BLD: 6.1 %
NEUTROPHILS # BLD AUTO: 5.1 K/UL
NEUTROPHILS NFR BLD: 70.7 %
NRBC BLD-RTO: 0 /100 WBC
PLATELET # BLD AUTO: 213 K/UL
PMV BLD AUTO: 10.2 FL
POCT GLUCOSE: 100 MG/DL (ref 70–110)
POCT GLUCOSE: 127 MG/DL (ref 70–110)
POCT GLUCOSE: 147 MG/DL (ref 70–110)
POCT GLUCOSE: 179 MG/DL (ref 70–110)
POCT GLUCOSE: 292 MG/DL (ref 70–110)
POCT GLUCOSE: 349 MG/DL (ref 70–110)
RBC # BLD AUTO: 2.99 M/UL
WBC # BLD AUTO: 7.2 K/UL

## 2018-10-06 PROCEDURE — 63600175 PHARM REV CODE 636 W HCPCS: Performed by: STUDENT IN AN ORGANIZED HEALTH CARE EDUCATION/TRAINING PROGRAM

## 2018-10-06 PROCEDURE — 97530 THERAPEUTIC ACTIVITIES: CPT | Performed by: PHYSICAL THERAPIST

## 2018-10-06 PROCEDURE — G8980 MOBILITY D/C STATUS: HCPCS | Mod: CM | Performed by: PHYSICAL THERAPIST

## 2018-10-06 PROCEDURE — 97535 SELF CARE MNGMENT TRAINING: CPT

## 2018-10-06 PROCEDURE — G8979 MOBILITY GOAL STATUS: HCPCS | Mod: CK | Performed by: PHYSICAL THERAPIST

## 2018-10-06 PROCEDURE — 25000003 PHARM REV CODE 250: Performed by: STUDENT IN AN ORGANIZED HEALTH CARE EDUCATION/TRAINING PROGRAM

## 2018-10-06 PROCEDURE — 97110 THERAPEUTIC EXERCISES: CPT | Performed by: PHYSICAL THERAPIST

## 2018-10-06 PROCEDURE — 36415 COLL VENOUS BLD VENIPUNCTURE: CPT

## 2018-10-06 PROCEDURE — G0378 HOSPITAL OBSERVATION PER HR: HCPCS

## 2018-10-06 PROCEDURE — G8978 MOBILITY CURRENT STATUS: HCPCS | Mod: CM | Performed by: PHYSICAL THERAPIST

## 2018-10-06 PROCEDURE — 94761 N-INVAS EAR/PLS OXIMETRY MLT: CPT

## 2018-10-06 PROCEDURE — 97530 THERAPEUTIC ACTIVITIES: CPT

## 2018-10-06 PROCEDURE — 25000003 PHARM REV CODE 250: Performed by: NURSE PRACTITIONER

## 2018-10-06 PROCEDURE — 99226 PR SUBSEQUENT OBSERVATION CARE,LEVEL III: CPT | Mod: GC,,, | Performed by: HOSPITALIST

## 2018-10-06 PROCEDURE — 85025 COMPLETE CBC W/AUTO DIFF WBC: CPT

## 2018-10-06 PROCEDURE — 63600175 PHARM REV CODE 636 W HCPCS: Performed by: NURSE PRACTITIONER

## 2018-10-06 RX ORDER — ACETAMINOPHEN 325 MG/1
650 TABLET ORAL EVERY 8 HOURS
Status: DISCONTINUED | OUTPATIENT
Start: 2018-10-06 | End: 2018-10-12 | Stop reason: HOSPADM

## 2018-10-06 RX ORDER — TRAMADOL HYDROCHLORIDE 50 MG/1
50 TABLET ORAL EVERY 6 HOURS PRN
Status: DISCONTINUED | OUTPATIENT
Start: 2018-10-06 | End: 2018-10-12 | Stop reason: HOSPADM

## 2018-10-06 RX ADMIN — INSULIN DETEMIR 20 UNITS: 100 INJECTION, SOLUTION SUBCUTANEOUS at 08:10

## 2018-10-06 RX ADMIN — FLUTICASONE FUROATE AND VILANTEROL TRIFENATATE 1 PUFF: 100; 25 POWDER RESPIRATORY (INHALATION) at 08:10

## 2018-10-06 RX ADMIN — ACETAMINOPHEN 650 MG: 325 TABLET, FILM COATED ORAL at 04:10

## 2018-10-06 RX ADMIN — SENNOSIDES AND DOCUSATE SODIUM 1 TABLET: 8.6; 5 TABLET ORAL at 09:10

## 2018-10-06 RX ADMIN — CLONIDINE HYDROCHLORIDE 0.3 MG: 0.2 TABLET ORAL at 03:10

## 2018-10-06 RX ADMIN — INSULIN ASPART 7 UNITS: 100 INJECTION, SOLUTION INTRAVENOUS; SUBCUTANEOUS at 08:10

## 2018-10-06 RX ADMIN — ASPIRIN 81 MG: 81 TABLET, COATED ORAL at 08:10

## 2018-10-06 RX ADMIN — ACETAMINOPHEN 650 MG: 325 TABLET, FILM COATED ORAL at 09:10

## 2018-10-06 RX ADMIN — HYDRALAZINE HYDROCHLORIDE 25 MG: 25 TABLET ORAL at 09:10

## 2018-10-06 RX ADMIN — HYDRALAZINE HYDROCHLORIDE 25 MG: 25 TABLET ORAL at 05:10

## 2018-10-06 RX ADMIN — ENOXAPARIN SODIUM 40 MG: 100 INJECTION SUBCUTANEOUS at 05:10

## 2018-10-06 RX ADMIN — CLONIDINE HYDROCHLORIDE 0.3 MG: 0.2 TABLET ORAL at 09:10

## 2018-10-06 RX ADMIN — CLOPIDOGREL 75 MG: 75 TABLET, FILM COATED ORAL at 08:10

## 2018-10-06 RX ADMIN — HYDRALAZINE HYDROCHLORIDE 25 MG: 25 TABLET ORAL at 03:10

## 2018-10-06 RX ADMIN — DULOXETINE HYDROCHLORIDE 60 MG: 30 CAPSULE, DELAYED RELEASE ORAL at 08:10

## 2018-10-06 RX ADMIN — INSULIN ASPART 7 UNITS: 100 INJECTION, SOLUTION INTRAVENOUS; SUBCUTANEOUS at 01:10

## 2018-10-06 RX ADMIN — PANTOPRAZOLE SODIUM 40 MG: 40 TABLET, DELAYED RELEASE ORAL at 08:10

## 2018-10-06 RX ADMIN — INSULIN ASPART 7 UNITS: 100 INJECTION, SOLUTION INTRAVENOUS; SUBCUTANEOUS at 05:10

## 2018-10-06 RX ADMIN — MEGESTROL ACETATE 40 MG: 40 TABLET ORAL at 08:10

## 2018-10-06 RX ADMIN — ACETAMINOPHEN 650 MG: 325 TABLET, FILM COATED ORAL at 05:10

## 2018-10-06 RX ADMIN — LOSARTAN POTASSIUM 100 MG: 50 TABLET ORAL at 08:10

## 2018-10-06 RX ADMIN — PREDNISONE 10 MG: 10 TABLET ORAL at 08:10

## 2018-10-06 RX ADMIN — KETOROLAC TROMETHAMINE 15 MG: 30 INJECTION, SOLUTION INTRAMUSCULAR at 12:10

## 2018-10-06 RX ADMIN — INSULIN ASPART 3 UNITS: 100 INJECTION, SOLUTION INTRAVENOUS; SUBCUTANEOUS at 01:10

## 2018-10-06 RX ADMIN — INSULIN ASPART 2 UNITS: 100 INJECTION, SOLUTION INTRAVENOUS; SUBCUTANEOUS at 09:10

## 2018-10-06 RX ADMIN — CLONIDINE HYDROCHLORIDE 0.3 MG: 0.2 TABLET ORAL at 08:10

## 2018-10-06 RX ADMIN — MEGESTROL ACETATE 40 MG: 40 TABLET ORAL at 09:10

## 2018-10-06 RX ADMIN — FUROSEMIDE 40 MG: 40 TABLET ORAL at 08:10

## 2018-10-06 RX ADMIN — SIMVASTATIN 20 MG: 20 TABLET, FILM COATED ORAL at 09:10

## 2018-10-06 RX ADMIN — FERROUS SULFATE TAB EC 325 MG (65 MG FE EQUIVALENT) 325 MG: 325 (65 FE) TABLET DELAYED RESPONSE at 08:10

## 2018-10-06 RX ADMIN — DILTIAZEM HYDROCHLORIDE 240 MG: 240 CAPSULE, COATED, EXTENDED RELEASE ORAL at 08:10

## 2018-10-06 NOTE — PLAN OF CARE
Problem: Patient Care Overview  Goal: Plan of Care Review  Outcome: Ongoing (interventions implemented as appropriate)  POC reviewed with patient.  No acute events overnight.  Blood glucose stable.  Pain well managed.  Turned q2hr.  Safety maintained.

## 2018-10-06 NOTE — PROGRESS NOTES
[facility] Hospital Medicine  Progress Note    Patient Name: Sheryl Martines  MRN: [id number]  Patient Class: OP- Observation   Admission Date: 10/3/2018  Length of Stay: 0 days  Attending Physician: Servando García, *  Primary Care Provider: Primary Doctor [facility] Medicine Team: [facility] HOSP MED 1 Mara Pizano MD    Subjective:     Principal Problem:Closed fracture of right tibia and fibula    HPI:  64 y/o female, who presents to the ED with R knee pain after fall from standing height.  She has a PMH of CVA, COPD, CAD, HTN, DM, HLD, and asthma.  She states that she was walking to her front step when her knees buckled, and she fell landing on her knees.  She reports using a walker at baseline to assist with ambulation.  She denies LOC or head trauma.  She was in substantial pain while in ED and received 5mg oxycodone IR, 15mg morphine PO, and 6mg morphine IM.  Imaging reveals nondisplaced right proximal tibial / fibular fractures.  She was evaluated by orthopedics while in ED and a splint was applied.     Recently admitted from SNF on 09/23/18 for AMS r/t possible Co2 narcosis, and was discharged home with home health on 10/2/2018.     Hospital Course:  Pt D/C home 2 days ago after prolonged stay in SNF and hospital for open appy complicated by infection and CO2 narcosis associated with AMS. Pt was discharged mentating and baseline and without signs of infection. It was recommended she d/c to SNF for rehab and additional care due to her deconditioning but pt was adamant about wanting to return home. Pt knees gave out while ambulating outside her home and collapsed to knees and then falling on her side, resulting in closed, non-displaced proximal fracture of the R tibia and fibula    Knee xray and CT knee demonstrate non-displaced fractures of the proximal tibia and fibula. For non-operative management per ortho. Leg splinted in ED. Other than her new fracture her medical  condition is as per discharge 2 days ago without new issues or deterioration. Awaiting SNF placement    Interval History:  OGHUGOFERN pt states she feels well other than leg. hyperglycemic today to 571 on SSI she was admitted on. Bolus with 24U detemir and 7U aspart repeat   90 minutes later. Given additional 10U aspart. Started on regular basal/prandial insulin 20U detemir + 6U aspart ACHS dosing + low dose SSI and will adjust as needed.     Past Medical History:   Diagnosis Date    Asthma     Closed compression fracture of fourth lumbar vertebra     COPD (chronic obstructive pulmonary disease)     Coronary artery disease     Diabetes mellitus     Glaucoma     High cholesterol     Hypertension     Iritis     Pulmonary embolus     Stroke     rt sided weakness.       Past Surgical History:   Procedure Laterality Date    ABDOMINAL SURGERY      APPENDECTOMY N/A 8/26/2018    Procedure: APPENDECTOMY;  Surgeon: Bernadine Melendrez MD;  Location: [facility] OR;  Service: General;  Laterality: N/A;    APPENDECTOMY N/A 8/26/2018    Performed by Bernadine Melendrez MD at [facility] OR    APPENDECTOMY, LAPAROSCOPIC---CONVERTED TO OPEN APPENDECTOMY @0950 N/A 8/26/2018    Performed by Bernadine Melendrez MD at [facility] OR    BACK SURGERY      stimulator    CATARACT EXTRACTION      HYSTERECTOMY      LAPAROSCOPIC APPENDECTOMY N/A 8/26/2018    Procedure: APPENDECTOMY, LAPAROSCOPIC---CONVERTED TO OPEN APPENDECTOMY @0950;  Surgeon: Bernadine Melendrez MD;  Location: [facility] OR;  Service: General;  Laterality: N/A;       Review of patient's allergies indicates:   Allergen Reactions    Ace inhibitors Swelling    Corticosteroids (glucocorticoids)     Hydralazine analogues     Tetracyclines Swelling    Travatan (with benzalkonium) [travoprost (benzalkonium)]        No current facility-administered medications on file prior to encounter.      Current Outpatient Medications on File Prior to Encounter   Medication Sig     acetaminophen (TYLENOL) 325 MG tablet Take 2 tablets (650 mg total) by mouth every 6 (six) hours as needed for Pain.    albuterol-ipratropium (DUO-NEB) 2.5 mg-0.5 mg/3 mL nebulizer solution Take 3 mLs by nebulization every 4 (four) hours. Rescue    aspirin (ECOTRIN) 81 MG EC tablet Take 1 tablet (81 mg total) by mouth once daily.    cloNIDine (CATAPRES) 0.3 MG tablet Take 1 tablet (0.3 mg total) by mouth 3 (three) times daily.    clopidogrel (PLAVIX) 75 mg tablet Take 75 mg by mouth once daily.    diltiaZEM (CARDIZEM CD) 240 MG 24 hr capsule Take 1 capsule (240 mg total) by mouth once daily.    DULoxetine (CYMBALTA) 60 MG capsule Take 60 mg by mouth once daily.    fluticasone-vilanterol (BREO) 100-25 mcg/dose diskus inhaler Inhale 1 puff into the lungs once daily. Controller    furosemide (LASIX) 40 MG tablet Take 40 mg by mouth once daily.     hydrALAZINE (APRESOLINE) 25 MG tablet Take 1 tablet (25 mg total) by mouth every 8 (eight) hours.    insulin detemir U-100 (LEVEMIR FLEXTOUCH) 100 unit/mL (3 mL) SubQ InPn pen Inject 24 Units into the skin once daily.    insulin lispro (HUMALOG KWIKPEN) 100 unit/mL InPn pen Inject 3 Units into the skin 3 (three) times daily with meals.    Lactobacillus acidophilus 1 billion cell Cap Take 1 tablet by mouth 2 (two) times daily.    losartan (COZAAR) 100 MG tablet Take 100 mg by mouth once daily.     megestrol (MEGACE) 40 MG Tab Take 40 mg by mouth 2 (two) times daily.    ondansetron (ZOFRAN) 4 MG tablet Take 1 tablet (4 mg total) by mouth every 6 (six) hours as needed.    pantoprazole (PROTONIX) 40 MG tablet Take 40 mg by mouth once daily.    predniSONE (DELTASONE) 10 MG tablet Take 10 mg by mouth once daily.     pyridoxine, vitamin B6, (VITAMIN B-6) 50 MG Tab Take 1 tablet (50 mg total) by mouth once daily.    senna-docusate 8.6-50 mg (SENNA WITH DOCUSATE SODIUM) 8.6-50 mg per tablet Take 1 tablet by mouth 2 (two) times daily.    simethicone 80 mg Tab Take  160 mg by mouth every 6 (six) hours as needed for Flatulence.    simvastatin (ZOCOR) 40 MG tablet Take 0.5 tablets (20 mg total) by mouth every evening.     Family History     Problem Relation (Age of Onset)    Cancer Father    Diabetes Brother    Lupus Sister    Multiple sclerosis Mother        Tobacco Use    Smoking status: Never Smoker   Substance and Sexual Activity    Alcohol use: No     Frequency: Never    Drug use: No    Sexual activity: No     Partners: Male     Review of Systems   Constitutional: Negative for chills and fever.   HENT: Negative for congestion and sore throat.    Eyes: Negative for pain and visual disturbance.   Respiratory: Negative for cough, chest tightness and shortness of breath.    Cardiovascular: Negative for chest pain and palpitations.   Gastrointestinal: Negative for abdominal pain, diarrhea, nausea and vomiting.   Genitourinary: Negative for dysuria and frequency.   Musculoskeletal: Positive for arthralgias, back pain and myalgias.   Skin: Negative for color change and rash.   Neurological: Negative for seizures and syncope.   Hematological: Bruises/bleeds easily.     Objective:     Vital Signs (Most Recent):  Temp: 98.3 °F (36.8 °C) (10/06/18 0817)  Pulse: 70 (10/06/18 0817)  Resp: 20 (10/06/18 0817)  BP: (!) 143/78 (10/06/18 0817)  SpO2: 99 % (10/06/18 0817) Vital Signs (24h Range):  Temp:  [96.2 °F (35.7 °C)-98.4 °F (36.9 °C)] 98.3 °F (36.8 °C)  Pulse:  [70-87] 70  Resp:  [16-22] 20  SpO2:  [96 %-99 %] 99 %  BP: (135-149)/(63-82) 143/78     Weight: 74.3 kg (163 lb 12.8 oz)  Body mass index is 31.99 kg/m².    Physical Exam   Constitutional: She is oriented to person, place, and time. She appears well-developed and well-nourished. No distress.   HENT:   Head: Normocephalic and atraumatic.   Eyes: EOM are normal. Pupils are equal, round, and reactive to light.   Neck: Normal range of motion. Neck supple.   Cardiovascular: Normal rate, regular rhythm, normal heart sounds and  intact distal pulses.   Pulmonary/Chest: Effort normal and breath sounds normal. No respiratory distress.   Abdominal: Soft. Bowel sounds are normal. She exhibits no distension. There is no tenderness.   Musculoskeletal: Normal range of motion. She exhibits edema.   Large bulky splint to RLE - sensory / motor intact    Neurological: She is alert and oriented to person, place, and time.   Skin: Skin is warm and dry. She is not diaphoretic.   Multiple areas of ecchymosis    Psychiatric: She has a normal mood and affect. Her behavior is normal.   Nursing note and vitals reviewed.        CRANIAL NERVES     CN III, IV, VI   Pupils are equal, round, and reactive to light.  Extraocular motions are normal.        Significant Labs:   Recent Results (from the past 24 hour(s))   ISTAT PROCEDURE    Collection Time: 10/05/18 12:24 PM   Result Value Ref Range    POC PH 7.491 (H) 7.35 - 7.45    POC PCO2 39.7 35 - 45 mmHg    POC PO2 77 (L) 80 - 100 mmHg    POC HCO3 30.4 (H) 24 - 28 mmol/L    POC BE 7 -2 to 2 mmol/L    POC SATURATED O2 96 95 - 100 %    POC TCO2 32 (H) 23 - 27 mmol/L    Sample ARTERIAL     Site LR     Allens Test Pass     DelSys Room Air    POCT glucose    Collection Time: 10/05/18 12:47 PM   Result Value Ref Range    POCT Glucose 498 (HH) 70 - 110 mg/dL   Basic Metabolic Panel (BMP)    Collection Time: 10/05/18 12:58 PM   Result Value Ref Range    Sodium 134 (L) 136 - 145 mmol/L    Potassium 5.3 (H) 3.5 - 5.1 mmol/L    Chloride 101 95 - 110 mmol/L    CO2 26 23 - 29 mmol/L    Glucose 571 (HH) 70 - 110 mg/dL    BUN, Bld 22 8 - 23 mg/dL    Creatinine 1.2 0.5 - 1.4 mg/dL    Calcium 9.2 8.7 - 10.5 mg/dL    Anion Gap 7 (L) 8 - 16 mmol/L    eGFR if African American 55.6 (A) >60 mL/min/1.73 m^2    eGFR if non  48.2 (A) >60 mL/min/1.73 m^2   Basic metabolic panel    Collection Time: 10/05/18  2:44 PM   Result Value Ref Range    Sodium 136 136 - 145 mmol/L    Potassium 4.4 3.5 - 5.1 mmol/L    Chloride 101 95 -  110 mmol/L    CO2 25 23 - 29 mmol/L    Glucose 472 (HH) 70 - 110 mg/dL    BUN, Bld 23 8 - 23 mg/dL    Creatinine 1.2 0.5 - 1.4 mg/dL    Calcium 9.5 8.7 - 10.5 mg/dL    Anion Gap 10 8 - 16 mmol/L    eGFR if African American 55.6 (A) >60 mL/min/1.73 m^2    eGFR if non  48.2 (A) >60 mL/min/1.73 m^2   POCT glucose    Collection Time: 10/05/18  2:59 PM   Result Value Ref Range    POCT Glucose 479 (HH) 70 - 110 mg/dL   POCT glucose    Collection Time: 10/05/18  6:14 PM   Result Value Ref Range    POCT Glucose 153 (H) 70 - 110 mg/dL   POCT glucose    Collection Time: 10/05/18  9:08 PM   Result Value Ref Range    POCT Glucose 70 70 - 110 mg/dL   POCT glucose    Collection Time: 10/05/18 10:47 PM   Result Value Ref Range    POCT Glucose 185 (H) 70 - 110 mg/dL   POCT glucose    Collection Time: 10/05/18 11:21 PM   Result Value Ref Range    POCT Glucose 179 (H) 70 - 110 mg/dL   POCT glucose    Collection Time: 10/06/18  1:05 AM   Result Value Ref Range    POCT Glucose 147 (H) 70 - 110 mg/dL   CBC with Automated Differential    Collection Time: 10/06/18  4:14 AM   Result Value Ref Range    WBC 7.20 3.90 - 12.70 K/uL    RBC 2.99 (L) 4.00 - 5.40 M/uL    Hemoglobin 8.0 (L) 12.0 - 16.0 g/dL    Hematocrit 25.9 (L) 37.0 - 48.5 %    MCV 87 82 - 98 fL    MCH 26.8 (L) 27.0 - 31.0 pg    MCHC 30.9 (L) 32.0 - 36.0 g/dL    RDW 19.3 (H) 11.5 - 14.5 %    Platelets 213 150 - 350 K/uL    MPV 10.2 9.2 - 12.9 fL    Immature Granulocytes 0.8 (H) 0.0 - 0.5 %    Gran # (ANC) 5.1 1.8 - 7.7 K/uL    Immature Grans (Abs) 0.06 (H) 0.00 - 0.04 K/uL    Lymph # 1.5 1.0 - 4.8 K/uL    Mono # 0.4 0.3 - 1.0 K/uL    Eos # 0.1 0.0 - 0.5 K/uL    Baso # 0.01 0.00 - 0.20 K/uL    nRBC 0 0 /100 WBC    Gran% 70.7 38.0 - 73.0 %    Lymph% 21.0 18.0 - 48.0 %    Mono% 6.1 4.0 - 15.0 %    Eosinophil% 1.3 0.0 - 8.0 %    Basophil% 0.1 0.0 - 1.9 %    Differential Method Automated      Medical record, imaging, EKG, and medications reviewed     Significant  Imaging: I have reviewed all pertinent imaging results/findings within the past 24 hours.    Review of Systems   Constitutional: Negative for chills, diaphoresis and fever.   Eyes: Negative for photophobia and visual disturbance.   Respiratory: Negative for cough, shortness of breath and wheezing.    Cardiovascular: Negative for palpitations and leg swelling.   Gastrointestinal: Negative for abdominal pain, constipation, diarrhea, nausea and vomiting.   Genitourinary: Negative for dysuria and flank pain.   Musculoskeletal: Positive for gait problem, joint swelling and myalgias. Negative for arthralgias.   Skin: Negative for rash and wound (open appy scar LLQ healing well).   Neurological: Positive for weakness. Negative for syncope and headaches.   Psychiatric/Behavioral: Negative for agitation, confusion and decreased concentration. The patient is not nervous/anxious.        Objective:     Vital Signs (Most Recent):  Temp: 98.3 °F (36.8 °C) (10/05/18 1616)  Pulse: 75 (10/05/18 1616)  Resp: 18 (10/05/18 1616)  BP: 135/67 (10/05/18 1616)  SpO2: 99 % (10/05/18 1616) Vital Signs (24h Range):  Temp:  [97.3 °F (36.3 °C)-99.3 °F (37.4 °C)] 98.3 °F (36.8 °C)  Pulse:  [74-92] 75  Resp:  [18-22] 18  SpO2:  [96 %-100 %] 99 %  BP: (131-142)/(62-68) 135/67     Weight: 74.3 kg (163 lb 12.8 oz)  Body mass index is 31.99 kg/m².    Intake/Output Summary (Last 24 hours) at 10/5/2018 1830  Last data filed at 10/5/2018 1644  Gross per 24 hour   Intake 300 ml   Output 1 ml   Net 299 ml      Physical Exam    Constitutional: She is oriented to person, place, and time. She appears well-developed and well-nourished. No distress.   AAOx3 but distractible, poor concentration, somnolent   HENT:   Head: Normocephalic and atraumatic.   Eyes: Conjunctivae are normal. No scleral icterus.   Neck: Neck supple. No JVD present. No tracheal deviation present.   Cardiovascular: Normal rate, regular rhythm, normal heart sounds and intact distal pulses.  Exam reveals no gallop and no friction rub.   Pulmonary/Chest: Effort normal and breath sounds normal. No stridor. No respiratory distress. She has no wheezes. She has no rales. She exhibits no tenderness.   Abdominal: Soft. Bowel sounds are normal. She exhibits no mass. There is no tenderness. There is no rebound and no guarding.   Musculoskeletal: She exhibits tenderness. She exhibits no edema or deformity.   R lower leg splint in place, leg elevated on pillow.   Neurological: She is alert and oriented to person, place, and time. No cranial nerve deficit or sensory deficit. Coordination normal.   Skin: Skin is warm and dry. She is not diaphoretic.   Psychiatric: She has a normal mood and affect. Her behavior is normal.   Vitals reviewed.    Assessment/Plan:      * Closed fracture of right tibia and fibula    Fall  - presents after fall from standing height following d/c home 2 days ago - no LOC or head trauma  - utilizes walker at baseline to assist with ambulation   - splint applied to RLE  - NWB to RLE  - PT/OT  - case management to assist with discharge planning   - PRN pain management   - CT R knee confirms closed non-displaced fx of proximal tibia and fibula  - non-operative management per Ortho, splint in place  - Pt pending SNF as has proven dangerous to remain at home despite her preference due to deconditioning from previous hospital course in addition to new fracture.        COPD (chronic obstructive pulmonary disease)    - denies SOB / wheezing  - in NAD  - resume home medication regimen   - supplemental oxygen as needed to maintain saturations > 92%  - duo nebs PRN SOB / Wheezing   - Encourage CPAP at night especially on opioids for PRN pain, pt is reluctant though. Previous CO2 narcosis on admit with AMS requiring bipap        History of CVA (cerebrovascular accident)    - hx of pontine stroke (2012)  - continue ASA and clopidogrel   - high risk of fall   - PT/OT        Anemia    - Hgb 8.9 on arrival  (baseline 9.2-9.5)  - transferrin trending upward at 126  - iron supplementation initiated   - trend h/h daily          Moderate malnutrition    - most recent albumin 2.5 (10/01)  - prealbumin pending   - NPO until cleared by Ortho   - previous nutrition rec's 09/21/18 encourage po intake, 2000 diabetic / cardiac, Boost breeze TID        Essential hypertension    - SBP ranging between 130-181  - possibly elevated d/t discomfort  - resume home antihypertensive regimen   - monitor and adjust therapy as indicated         CAD (coronary artery disease)    - denies CP / SOB  - EKG without obvious acute ischemic changes  - continue ASA and statin   - maintain on telemetry   - trend electrolytes and replete as indicated         Insulin dependent diabetes mellitus    24U detemir + 4U aspart with meals on prior discharge  BGL to 572 today but only SSI throughout the day  Given 24U detemir and 7U aspart -->  ~90mins later -> additional 10U lantus  Scheduled 20U detemir + 6U aspart ACHS + low dose SSI, will adjust as needed  q4h POC until BGLs stable          VTE Risk Mitigation (From admission, onward)        Ordered     enoxaparin injection 40 mg  Daily      10/04/18 1022     IP VTE HIGH RISK PATIENT  Once      10/04/18 0338     Place MARGIE hose  Until discontinued      10/04/18 0338     Place sequential compression device  Until discontinued      10/04/18 0338          Plan discussed with attending Dr. Servando García, * , further recommendations as per attending addendum. Please feel free to call with any questions or concerns.            Mara Pizano MD  Department of Hospital Medicine   [facility]

## 2018-10-06 NOTE — PLAN OF CARE
Problem: Fall Risk (Adult)  Goal: Identify Related Risk Factors and Signs and Symptoms  Related risk factors and signs and symptoms are identified upon initiation of Human Response Clinical Practice Guideline (CPG)  Outcome: Ongoing (interventions implemented as appropriate)   10/06/18 1522   Fall Risk   Related Risk Factors (Fall Risk) slipper/uneven surfaces;environment unfamiliar;polypharmacy;gait/mobility problems;history of falls     Fall precautions in place. No falls occurred during this shift. Will continue to monitor.     Problem: Patient Care Overview  Goal: Plan of Care Review  Outcome: Ongoing (interventions implemented as appropriate)   10/06/18 1522   Coping/Psychosocial   Plan Of Care Reviewed With patient     Patient is awake, alert and oriented. No apparent distress. VSS. Patient blood glucose being managed per MAR. Patient able to verbalize pain relief and pain goals. Pain medication administered as needed per MAR. POC discussed with patient and daughters at bedside. No other concerns at this time. Will continue to monitor.     Problem: Infection, Risk/Actual (Adult)  Goal: Identify Related Risk Factors and Signs and Symptoms  Related risk factors and signs and symptoms are identified upon initiation of Human Response Clinical Practice Guideline (CPG)   10/06/18 1522   Infection, Risk/Actual   Related Risk Factors (Infection, Risk/Actual) chronic illness/condition;trauma injury;skin integrity impairment     VSS. No signs or symptoms of infection present this shift.

## 2018-10-06 NOTE — SUBJECTIVE & OBJECTIVE
Principal Problem:Closed fracture of right tibia and fibula    Principal Orthopedic Problem: nondisplaced R proximal tibia fx     Interval History: NAEON. Pt doing well. Pain controlled. In long leg splint.     Review of patient's allergies indicates:   Allergen Reactions    Ace inhibitors Swelling    Corticosteroids (glucocorticoids)     Hydralazine analogues     Tetracyclines Swelling    Travatan (with benzalkonium) [travoprost (benzalkonium)]        Current Facility-Administered Medications   Medication    acetaminophen tablet 650 mg    albuterol-ipratropium 2.5 mg-0.5 mg/3 mL nebulizer solution 3 mL    aspirin EC tablet 81 mg    calcium carbonate 200 mg calcium (500 mg) chewable tablet 500 mg    cloNIDine tablet 0.3 mg    clopidogrel tablet 75 mg    dextrose 50% injection 12.5 g    dextrose 50% injection 25 g    diltiaZEM 24 hr capsule 240 mg    diphenhydrAMINE injection 12.5 mg    DULoxetine DR capsule 60 mg    enoxaparin injection 40 mg    ferrous sulfate EC tablet 325 mg    fluticasone-vilanterol 100-25 mcg/dose diskus inhaler 1 puff    furosemide tablet 40 mg    glucagon (human recombinant) injection 1 mg    glucose chewable tablet 16 g    glucose chewable tablet 24 g    hydrALAZINE tablet 25 mg    insulin aspart U-100 pen 0-5 Units    insulin aspart U-100 pen 7 Units    insulin detemir U-100 pen 20 Units    ketorolac injection 15 mg    losartan tablet 100 mg    megestrol tablet 40 mg    ondansetron disintegrating tablet 8 mg    ondansetron injection 4 mg    oxyCODONE immediate release tablet 5 mg    pantoprazole EC tablet 40 mg    predniSONE tablet 10 mg    ramelteon tablet 8 mg    senna-docusate 8.6-50 mg per tablet 1 tablet    simethicone chewable tablet 80 mg    simvastatin tablet 20 mg    sodium chloride 0.9% flush 5 mL     Objective:     Vital Signs (Most Recent):  Temp: 98.3 °F (36.8 °C) (10/06/18 0817)  Pulse: 70 (10/06/18 0817)  Resp: 20 (10/06/18 0817)  BP:  (!) 143/78 (10/06/18 0817)  SpO2: 99 % (10/06/18 0817) Vital Signs (24h Range):  Temp:  [96.2 °F (35.7 °C)-98.4 °F (36.9 °C)] 98.3 °F (36.8 °C)  Pulse:  [70-87] 70  Resp:  [16-22] 20  SpO2:  [96 %-99 %] 99 %  BP: (135-149)/(63-82) 143/78     Weight: 74.3 kg (163 lb 12.8 oz)  Height: 5' (152.4 cm)  Body mass index is 31.99 kg/m².      Intake/Output Summary (Last 24 hours) at 10/6/2018 1057  Last data filed at 10/6/2018 0500  Gross per 24 hour   Intake 525 ml   Output 1 ml   Net 524 ml       Ortho/SPM Exam     Long leg splint in place  NVI distally   Good cap refill  SILT distally        Significant Labs: All pertinent labs within the past 24 hours have been reviewed.    Significant Imaging: I have reviewed and interpreted all pertinent imaging results/findings.     CT with no intrarticular extension

## 2018-10-06 NOTE — PLAN OF CARE
Problem: Physical Therapy Goal  Goal: Physical Therapy Goal  Goals to be met by: 10/18/18     Patient will increase functional independence with mobility by performing (all while maintaining NWB RLE):    1. Supine to sit with MInimal Assistance  2. Sit to supine with MInimal Assistance  3. Sit to stand transfer with Maximum Assistance  4. Bed to chair transfer with Maximum Assistance using Rolling Walker  5. Gait  x 5 feet with Moderate Assistance using Rolling Walker.   6. Stand for 1 minutes with Minimal Assistance using Rolling Walker     Outcome: Ongoing (interventions implemented as appropriate)  Goals remain appropriate.  Pt tolerated sitting EOB better today and was able to attempt to stand x1 trial but was very anxious.  Pt will continue to require extensive therapy to maximize functiona and restore PLOF.  She will require SNF upon d/c

## 2018-10-06 NOTE — PROGRESS NOTES
[facility]  Orthopedics  Progress Note    Patient Name: Sheryl Martines  MRN: [id number]  Admission Date: 10/3/2018  Hospital Length of Stay: 0 days  Attending Provider: Servando García, *  Primary Care Provider: Primary Doctor No    Subjective:     Principal Problem:Closed fracture of right tibia and fibula    Principal Orthopedic Problem: nondisplaced R proximal tibia fx     Interval History: NAEON. Pt doing well. Pain controlled. In long leg splint.     Review of patient's allergies indicates:   Allergen Reactions    Ace inhibitors Swelling    Corticosteroids (glucocorticoids)     Hydralazine analogues     Tetracyclines Swelling    Travatan (with benzalkonium) [travoprost (benzalkonium)]        Current Facility-Administered Medications   Medication    acetaminophen tablet 650 mg    albuterol-ipratropium 2.5 mg-0.5 mg/3 mL nebulizer solution 3 mL    aspirin EC tablet 81 mg    calcium carbonate 200 mg calcium (500 mg) chewable tablet 500 mg    cloNIDine tablet 0.3 mg    clopidogrel tablet 75 mg    dextrose 50% injection 12.5 g    dextrose 50% injection 25 g    diltiaZEM 24 hr capsule 240 mg    diphenhydrAMINE injection 12.5 mg    DULoxetine DR capsule 60 mg    enoxaparin injection 40 mg    ferrous sulfate EC tablet 325 mg    fluticasone-vilanterol 100-25 mcg/dose diskus inhaler 1 puff    furosemide tablet 40 mg    glucagon (human recombinant) injection 1 mg    glucose chewable tablet 16 g    glucose chewable tablet 24 g    hydrALAZINE tablet 25 mg    insulin aspart U-100 pen 0-5 Units    insulin aspart U-100 pen 7 Units    insulin detemir U-100 pen 20 Units    ketorolac injection 15 mg    losartan tablet 100 mg    megestrol tablet 40 mg    ondansetron disintegrating tablet 8 mg    ondansetron injection 4 mg    oxyCODONE immediate release tablet 5 mg    pantoprazole EC tablet 40 mg    predniSONE tablet 10 mg    ramelteon tablet 8 mg    senna-docusate  8.6-50 mg per tablet 1 tablet    simethicone chewable tablet 80 mg    simvastatin tablet 20 mg    sodium chloride 0.9% flush 5 mL     Objective:     Vital Signs (Most Recent):  Temp: 98.3 °F (36.8 °C) (10/06/18 0817)  Pulse: 70 (10/06/18 0817)  Resp: 20 (10/06/18 0817)  BP: (!) 143/78 (10/06/18 0817)  SpO2: 99 % (10/06/18 0817) Vital Signs (24h Range):  Temp:  [96.2 °F (35.7 °C)-98.4 °F (36.9 °C)] 98.3 °F (36.8 °C)  Pulse:  [70-87] 70  Resp:  [16-22] 20  SpO2:  [96 %-99 %] 99 %  BP: (135-149)/(63-82) 143/78     Weight: 74.3 kg (163 lb 12.8 oz)  Height: 5' (152.4 cm)  Body mass index is 31.99 kg/m².      Intake/Output Summary (Last 24 hours) at 10/6/2018 1057  Last data filed at 10/6/2018 0500  Gross per 24 hour   Intake 525 ml   Output 1 ml   Net 524 ml       Ortho/SPM Exam     Long leg splint in place  NVI distally   Good cap refill  SILT distally        Significant Labs: All pertinent labs within the past 24 hours have been reviewed.    Significant Imaging: I have reviewed and interpreted all pertinent imaging results/findings.     CT with no intrarticular extension    Assessment/Plan:     * Closed fracture of right tibia and fibula    Sheryl Martines is a 63 y.o. female with Right non displaced fracture of proximal tibia/fibula   - long leg splint in place  - NWB RLE  - will attempt nonop management of this fracture with immobilization in a long leg splint.   - dispo: pending PT placement                Hesham Cortés MD  Orthopedics  [facility]

## 2018-10-06 NOTE — ASSESSMENT & PLAN NOTE
Sheryl Martines is a 63 y.o. female with Right non displaced fracture of proximal tibia/fibula   - long leg splint in place  - NWB RLE  - will attempt nonop management of this fracture with immobilization in a long leg splint.   - dispo: pending PT placement

## 2018-10-06 NOTE — PT/OT/SLP PROGRESS
Physical Therapy Treatment    Patient Name:  Sheryl Martines   MRN:  [id number]    Recommendations:     Discharge Recommendations:  nursing facility, skilled   Discharge Equipment Recommendations:     Barriers to discharge: Inaccessible home and Decreased caregiver support    Assessment:     Sheryl Martnies is a 63 y.o. female admitted with a medical diagnosis of Closed fracture of right tibia and fibula.  She presents with the following impairments/functional limitations:  weakness, impaired balance, impaired endurance, impaired functional mobilty, gait instability, decreased lower extremity function, decreased safety awareness, orthopedic precautions Pt with improved tolerance to PT treatment sitting at EOB with SBA and performing bed mobility with min A today.  Pt continued to be very anxious about standing requiring mod/max of 2 to stand and becoming very fearful despite good maintenance of NWB.  Pt refused transfer to [facility] today and must attempt this in upcoming sessions as a means of progressing mobility status.    Rehab Prognosis:  good; patient would benefit from acute skilled PT services to address these deficits and reach maximum level of function.      Recent Surgery: * No surgery found *      Plan:     During this hospitalization, patient to be seen 4 x/week to address the above listed problems via gait training, therapeutic activities, therapeutic exercises  · Plan of Care Expires:  11/03/18   Plan of Care Reviewed with: patient    Subjective     Communicated with RN prior to session.  Patient found supine upon PT entry to room, agreeable to treatment.      Chief Complaint: RLE pain  Patient comments/goals: to get better  Pain/Comfort:  · Pain Rating 1: other (see comments)(says it hurts when she moves)  · Location - Side 1: Right  · Location 1: leg  · Pain Addressed 1: Reposition  · Pain Rating Post-Intervention 1: 7/10    Patients cultural, spiritual, Pentec[employer]al conflicts given the current  situation: none    Objective:     Patient found with: telemetry     General Precautions: Standard, fall   Orthopedic Precautions:RLE non weight bearing   Braces: (cast)     Functional Mobility:  · Bed Mobility:     · Scooting: total assistance and to scoot to HOB and adjust sheets  · Supine to Sit: minimum assistance and to guide RLE  · Sit to Supine: minimum assistance  · Transfers:     · Sit to Stand:  total assistance, (Mod of 2) with rolling walker.  Pt able to maintain NWB on the RLE but was very anxious and complained of pain  · Balance: pt sat at EOB and performed various UE tasks with SBA/Supervision.  pt only toelrated standign for about 5 seconds before becoming fearful and demanding to sit down      AM-PAC 6 CLICK MOBILITY  Turning over in bed (including adjusting bedclothes, sheets and blankets)?: 2  Sitting down on and standing up from a chair with arms (e.g., wheelchair, bedside commode, etc.): 1  Moving from lying on back to sitting on the side of the bed?: 2  Moving to and from a bed to a chair (including a wheelchair)?: 1  Need to walk in hospital room?: 1  Climbing 3-5 steps with a railing?: 1  Basic Mobility Total Score: 8       Therapeutic Activities and Exercises:   pt performed LAQ, hip flexion, and ankle pumps all with the LLE while sitting at EOB.  Pt performed UE punches and bicep curls as well sitting at EOB all while maintaining sitting balance with SBA/CGA  Pt educated on PT POC and NWB  Pt educated on need for OOB activity and for ankle pumps and LLE exercises while in bed    Patient left supine with all lines intact and call button in reach..    GOALS:   Multidisciplinary Problems     Physical Therapy Goals        Problem: Physical Therapy Goal    Goal Priority Disciplines Outcome Goal Variances Interventions   Physical Therapy Goal     PT, PT/OT Ongoing (interventions implemented as appropriate)     Description:  Goals to be met by: 10/18/18     Patient will increase functional  independence with mobility by performing (all while maintaining NWB RLE):    1. Supine to sit with MInimal Assistance  2. Sit to supine with MInimal Assistance  3. Sit to stand transfer with Maximum Assistance  4. Bed to chair transfer with Maximum Assistance using Rolling Walker  5. Gait  x 5 feet with Moderate Assistance using Rolling Walker.   6. Stand for 1 minutes with Minimal Assistance using Rolling Walker                      Time Tracking:     PT Received On: 10/06/18  PT Start Time: 0855     PT Stop Time: 0929  PT Total Time (min): 34 min     Billable Minutes: Therapeutic Activity 20 and Therapeutic Exercise 14    Treatment Type: Treatment  PT/PTA: PT     PTA Visit Number: 0     Hussein Vogel, SAMIA  10/06/2018

## 2018-10-06 NOTE — PLAN OF CARE
Problem: Occupational Therapy Goal  Goal: Occupational Therapy Goal  Goals to be met by: 10/18/18     Patient will increase functional independence with ADLs by performing:    UE Dressing with Set-up Assistance and Supervision.  LE Dressing with Minimal Assistance, with modification/AE prn  Grooming while seated with Supervision.  Toileting from bedside commode with Moderate Assistance for hygiene and clothing management.   Toilet transfer to bedside commode with Minimal Assistance.     Outcome: Ongoing (interventions implemented as appropriate)  Con't with goals  Cathy Lewis, CLYDER

## 2018-10-07 LAB
ANION GAP SERPL CALC-SCNC: 8 MMOL/L
BASOPHILS # BLD AUTO: 0.02 K/UL
BASOPHILS NFR BLD: 0.3 %
BUN SERPL-MCNC: 17 MG/DL
CALCIUM SERPL-MCNC: 9.6 MG/DL
CHLORIDE SERPL-SCNC: 104 MMOL/L
CO2 SERPL-SCNC: 27 MMOL/L
CREAT SERPL-MCNC: 0.9 MG/DL
DIFFERENTIAL METHOD: ABNORMAL
EOSINOPHIL # BLD AUTO: 0.1 K/UL
EOSINOPHIL NFR BLD: 1.5 %
ERYTHROCYTE [DISTWIDTH] IN BLOOD BY AUTOMATED COUNT: 19.5 %
EST. GFR  (AFRICAN AMERICAN): >60 ML/MIN/1.73 M^2
EST. GFR  (NON AFRICAN AMERICAN): >60 ML/MIN/1.73 M^2
GLUCOSE SERPL-MCNC: 116 MG/DL
HCT VFR BLD AUTO: 29.4 %
HGB BLD-MCNC: 8.7 G/DL
IMM GRANULOCYTES # BLD AUTO: 0.06 K/UL
IMM GRANULOCYTES NFR BLD AUTO: 0.8 %
LYMPHOCYTES # BLD AUTO: 1.7 K/UL
LYMPHOCYTES NFR BLD: 23.6 %
MCH RBC QN AUTO: 26.4 PG
MCHC RBC AUTO-ENTMCNC: 29.6 G/DL
MCV RBC AUTO: 89 FL
MONOCYTES # BLD AUTO: 0.4 K/UL
MONOCYTES NFR BLD: 5.6 %
NEUTROPHILS # BLD AUTO: 5 K/UL
NEUTROPHILS NFR BLD: 68.2 %
NRBC BLD-RTO: 0 /100 WBC
PLATELET # BLD AUTO: 238 K/UL
PMV BLD AUTO: 9.9 FL
POCT GLUCOSE: 168 MG/DL (ref 70–110)
POCT GLUCOSE: 214 MG/DL (ref 70–110)
POCT GLUCOSE: 284 MG/DL (ref 70–110)
POCT GLUCOSE: 291 MG/DL (ref 70–110)
POTASSIUM SERPL-SCNC: 4.3 MMOL/L
RBC # BLD AUTO: 3.3 M/UL
SODIUM SERPL-SCNC: 139 MMOL/L
WBC # BLD AUTO: 7.38 K/UL

## 2018-10-07 PROCEDURE — 94761 N-INVAS EAR/PLS OXIMETRY MLT: CPT

## 2018-10-07 PROCEDURE — 85025 COMPLETE CBC W/AUTO DIFF WBC: CPT

## 2018-10-07 PROCEDURE — 36415 COLL VENOUS BLD VENIPUNCTURE: CPT

## 2018-10-07 PROCEDURE — 94640 AIRWAY INHALATION TREATMENT: CPT

## 2018-10-07 PROCEDURE — 63600175 PHARM REV CODE 636 W HCPCS: Performed by: STUDENT IN AN ORGANIZED HEALTH CARE EDUCATION/TRAINING PROGRAM

## 2018-10-07 PROCEDURE — G0378 HOSPITAL OBSERVATION PER HR: HCPCS

## 2018-10-07 PROCEDURE — 25000003 PHARM REV CODE 250: Performed by: NURSE PRACTITIONER

## 2018-10-07 PROCEDURE — 80048 BASIC METABOLIC PNL TOTAL CA: CPT

## 2018-10-07 PROCEDURE — 82962 GLUCOSE BLOOD TEST: CPT

## 2018-10-07 PROCEDURE — 25000003 PHARM REV CODE 250: Performed by: STUDENT IN AN ORGANIZED HEALTH CARE EDUCATION/TRAINING PROGRAM

## 2018-10-07 PROCEDURE — 99224 PR SUBSEQUENT OBSERVATION CARE,LEVEL I: CPT | Mod: GC,,, | Performed by: HOSPITALIST

## 2018-10-07 PROCEDURE — 25000242 PHARM REV CODE 250 ALT 637 W/ HCPCS: Performed by: NURSE PRACTITIONER

## 2018-10-07 PROCEDURE — 63600175 PHARM REV CODE 636 W HCPCS: Performed by: NURSE PRACTITIONER

## 2018-10-07 RX ORDER — INSULIN ASPART 100 [IU]/ML
8 INJECTION, SOLUTION INTRAVENOUS; SUBCUTANEOUS
Status: DISCONTINUED | OUTPATIENT
Start: 2018-10-07 | End: 2018-10-08

## 2018-10-07 RX ADMIN — SENNOSIDES AND DOCUSATE SODIUM 1 TABLET: 8.6; 5 TABLET ORAL at 09:10

## 2018-10-07 RX ADMIN — SIMVASTATIN 20 MG: 20 TABLET, FILM COATED ORAL at 09:10

## 2018-10-07 RX ADMIN — IPRATROPIUM BROMIDE AND ALBUTEROL SULFATE 3 ML: .5; 3 SOLUTION RESPIRATORY (INHALATION) at 05:10

## 2018-10-07 RX ADMIN — CLONIDINE HYDROCHLORIDE 0.3 MG: 0.2 TABLET ORAL at 03:10

## 2018-10-07 RX ADMIN — INSULIN ASPART 1 UNITS: 100 INJECTION, SOLUTION INTRAVENOUS; SUBCUTANEOUS at 10:10

## 2018-10-07 RX ADMIN — CLONIDINE HYDROCHLORIDE 0.3 MG: 0.2 TABLET ORAL at 09:10

## 2018-10-07 RX ADMIN — FUROSEMIDE 40 MG: 40 TABLET ORAL at 08:10

## 2018-10-07 RX ADMIN — INSULIN ASPART 8 UNITS: 100 INJECTION, SOLUTION INTRAVENOUS; SUBCUTANEOUS at 01:10

## 2018-10-07 RX ADMIN — PANTOPRAZOLE SODIUM 40 MG: 40 TABLET, DELAYED RELEASE ORAL at 08:10

## 2018-10-07 RX ADMIN — FLUTICASONE FUROATE AND VILANTEROL TRIFENATATE 1 PUFF: 100; 25 POWDER RESPIRATORY (INHALATION) at 10:10

## 2018-10-07 RX ADMIN — HYDRALAZINE HYDROCHLORIDE 25 MG: 25 TABLET ORAL at 01:10

## 2018-10-07 RX ADMIN — DILTIAZEM HYDROCHLORIDE 240 MG: 240 CAPSULE, COATED, EXTENDED RELEASE ORAL at 08:10

## 2018-10-07 RX ADMIN — INSULIN ASPART 7 UNITS: 100 INJECTION, SOLUTION INTRAVENOUS; SUBCUTANEOUS at 09:10

## 2018-10-07 RX ADMIN — MEGESTROL ACETATE 40 MG: 40 TABLET ORAL at 09:10

## 2018-10-07 RX ADMIN — DULOXETINE HYDROCHLORIDE 60 MG: 30 CAPSULE, DELAYED RELEASE ORAL at 08:10

## 2018-10-07 RX ADMIN — INSULIN DETEMIR 20 UNITS: 100 INJECTION, SOLUTION SUBCUTANEOUS at 09:10

## 2018-10-07 RX ADMIN — HYDRALAZINE HYDROCHLORIDE 25 MG: 25 TABLET ORAL at 06:10

## 2018-10-07 RX ADMIN — PREDNISONE 10 MG: 10 TABLET ORAL at 08:10

## 2018-10-07 RX ADMIN — ACETAMINOPHEN 650 MG: 325 TABLET, FILM COATED ORAL at 10:10

## 2018-10-07 RX ADMIN — SENNOSIDES AND DOCUSATE SODIUM 1 TABLET: 8.6; 5 TABLET ORAL at 08:10

## 2018-10-07 RX ADMIN — CLONIDINE HYDROCHLORIDE 0.3 MG: 0.2 TABLET ORAL at 08:10

## 2018-10-07 RX ADMIN — FERROUS SULFATE TAB EC 325 MG (65 MG FE EQUIVALENT) 325 MG: 325 (65 FE) TABLET DELAYED RESPONSE at 08:10

## 2018-10-07 RX ADMIN — INSULIN ASPART 4 UNITS: 100 INJECTION, SOLUTION INTRAVENOUS; SUBCUTANEOUS at 01:10

## 2018-10-07 RX ADMIN — INSULIN ASPART 8 UNITS: 100 INJECTION, SOLUTION INTRAVENOUS; SUBCUTANEOUS at 06:10

## 2018-10-07 RX ADMIN — MEGESTROL ACETATE 40 MG: 40 TABLET ORAL at 08:10

## 2018-10-07 RX ADMIN — CLOPIDOGREL 75 MG: 75 TABLET, FILM COATED ORAL at 08:10

## 2018-10-07 RX ADMIN — INSULIN ASPART 3 UNITS: 100 INJECTION, SOLUTION INTRAVENOUS; SUBCUTANEOUS at 06:10

## 2018-10-07 RX ADMIN — ACETAMINOPHEN 650 MG: 325 TABLET, FILM COATED ORAL at 06:10

## 2018-10-07 RX ADMIN — ENOXAPARIN SODIUM 40 MG: 100 INJECTION SUBCUTANEOUS at 04:10

## 2018-10-07 RX ADMIN — ASPIRIN 81 MG: 81 TABLET, COATED ORAL at 08:10

## 2018-10-07 RX ADMIN — HYDRALAZINE HYDROCHLORIDE 25 MG: 25 TABLET ORAL at 10:10

## 2018-10-07 RX ADMIN — ACETAMINOPHEN 650 MG: 325 TABLET, FILM COATED ORAL at 01:10

## 2018-10-07 RX ADMIN — LOSARTAN POTASSIUM 100 MG: 50 TABLET ORAL at 08:10

## 2018-10-07 NOTE — PROGRESS NOTES
[facility] Hospital Medicine  Progress Note    Patient Name: Sheryl Martines  MRN: [id number]  Patient Class: OP- Observation   Admission Date: 10/3/2018  Length of Stay: 0 days  Attending Physician: Servando García, *  Primary Care Provider: [facility] Medicine Team: [facility] MED 1 Wayne Morris MD    Subjective:     Principal Problem:Closed fracture of right tibia and fibula    HPI:  62 y/o female, who presents to the ED with R knee pain after fall from standing height.  She has a PMH of CVA, COPD, CAD, HTN, DM, HLD, and asthma.  She states that she was walking to her front step when her knees buckled, and she fell landing on her knees.  She reports using a walker at baseline to assist with ambulation.  She denies LOC or head trauma.  She was in substantial pain while in ED and received 5mg oxycodone IR, 15mg morphine PO, and 6mg morphine IM.  Imaging reveals nondisplaced right proximal tibial / fibular fractures.  She was evaluated by orthopedics while in ED and a splint was applied.     Recently admitted from SNF on 09/23/18 for AMS r/t possible Co2 narcosis, and was discharged home with home health on 10/2/2018.     Hospital Course:  Pt D/C home 2 days ago after prolonged stay in SNF and hospital for open appy complicated by infection and CO2 narcosis associated with AMS. Pt was discharged mentating and baseline and without signs of infection. It was recommended she d/c to SNF for rehab and additional care due to her deconditioning but pt was adamant about wanting to return home. Pt knees gave out while ambulating outside her home and collapsed to knees and then falling on her side, resulting in closed, non-displaced proximal fracture of the R tibia and fibula    Knee xray and CT knee demonstrate non-displaced fractures of the proximal tibia and fibula. For non-operative management per ortho. Leg splinted in ED. Other than her new fracture her medical condition is as  per recent hospital d/c without new issues or deterioration. Awaiting SNF placement    Interval History:  YG, pt states she feels well other than leg. Continuing to adjust insulin regimen to optimize BGLs. Opioids have been held, as pt not using and pt prone to over-sedation with CO2 retention. Currently awaiting SNF placement.     Past Medical History:   Diagnosis Date    Asthma     Closed compression fracture of fourth lumbar vertebra     COPD (chronic obstructive pulmonary disease)     Coronary artery disease     Diabetes mellitus     Glaucoma     High cholesterol     Hypertension     Iritis     Pulmonary embolus     Stroke     rt sided weakness.       Past Surgical History:   Procedure Laterality Date    ABDOMINAL SURGERY      APPENDECTOMY N/A 8/26/2018    Procedure: APPENDECTOMY;  Surgeon: Bernadine Melendrez MD;  Location: [facility] OR;  Service: General;  Laterality: N/A;    APPENDECTOMY N/A 8/26/2018    Performed by Bernadine Melendrez MD at [facility] OR    APPENDECTOMY, LAPAROSCOPIC---CONVERTED TO OPEN APPENDECTOMY @0950 N/A 8/26/2018    Performed by Bernadine Melendrez MD at [facility] OR    BACK SURGERY      stimulator    CATARACT EXTRACTION      HYSTERECTOMY      LAPAROSCOPIC APPENDECTOMY N/A 8/26/2018    Procedure: APPENDECTOMY, LAPAROSCOPIC---CONVERTED TO OPEN APPENDECTOMY @0950;  Surgeon: Bernadine Melendrez MD;  Location: [facility] OR;  Service: General;  Laterality: N/A;       Review of patient's allergies indicates:   Allergen Reactions    Ace inhibitors Swelling    Corticosteroids (glucocorticoids)     Hydralazine analogues     Tetracyclines Swelling    Travatan (with benzalkonium) [travoprost (benzalkonium)]        No current facility-administered medications on file prior to encounter.      Current Outpatient Medications on File Prior to Encounter   Medication Sig    acetaminophen (TYLENOL) 325 MG tablet Take 2 tablets (650 mg total) by mouth every 6 (six) hours as needed for Pain.     albuterol-ipratropium (DUO-NEB) 2.5 mg-0.5 mg/3 mL nebulizer solution Take 3 mLs by nebulization every 4 (four) hours. Rescue    aspirin (ECOTRIN) 81 MG EC tablet Take 1 tablet (81 mg total) by mouth once daily.    cloNIDine (CATAPRES) 0.3 MG tablet Take 1 tablet (0.3 mg total) by mouth 3 (three) times daily.    clopidogrel (PLAVIX) 75 mg tablet Take 75 mg by mouth once daily.    diltiaZEM (CARDIZEM CD) 240 MG 24 hr capsule Take 1 capsule (240 mg total) by mouth once daily.    DULoxetine (CYMBALTA) 60 MG capsule Take 60 mg by mouth once daily.    fluticasone-vilanterol (BREO) 100-25 mcg/dose diskus inhaler Inhale 1 puff into the lungs once daily. Controller    furosemide (LASIX) 40 MG tablet Take 40 mg by mouth once daily.     hydrALAZINE (APRESOLINE) 25 MG tablet Take 1 tablet (25 mg total) by mouth every 8 (eight) hours.    insulin detemir U-100 (LEVEMIR FLEXTOUCH) 100 unit/mL (3 mL) SubQ InPn pen Inject 24 Units into the skin once daily.    insulin lispro (HUMALOG KWIKPEN) 100 unit/mL InPn pen Inject 3 Units into the skin 3 (three) times daily with meals.    Lactobacillus acidophilus 1 billion cell Cap Take 1 tablet by mouth 2 (two) times daily.    losartan (COZAAR) 100 MG tablet Take 100 mg by mouth once daily.     megestrol (MEGACE) 40 MG Tab Take 40 mg by mouth 2 (two) times daily.    ondansetron (ZOFRAN) 4 MG tablet Take 1 tablet (4 mg total) by mouth every 6 (six) hours as needed.    pantoprazole (PROTONIX) 40 MG tablet Take 40 mg by mouth once daily.    predniSONE (DELTASONE) 10 MG tablet Take 10 mg by mouth once daily.     pyridoxine, vitamin B6, (VITAMIN B-6) 50 MG Tab Take 1 tablet (50 mg total) by mouth once daily.    senna-docusate 8.6-50 mg (SENNA WITH DOCUSATE SODIUM) 8.6-50 mg per tablet Take 1 tablet by mouth 2 (two) times daily.    simethicone 80 mg Tab Take 160 mg by mouth every 6 (six) hours as needed for Flatulence.    simvastatin (ZOCOR) 40 MG tablet Take 0.5 tablets (20 mg  total) by mouth every evening.     Family History     Problem Relation (Age of Onset)    Cancer Father    Diabetes Brother    Lupus Sister    Multiple sclerosis Mother        Tobacco Use    Smoking status: Never Smoker   Substance and Sexual Activity    Alcohol use: No     Frequency: Never    Drug use: No    Sexual activity: No     Partners: Male     Review of Systems   Constitutional: Negative for chills and fever.   HENT: Negative for congestion and sore throat.    Eyes: Negative for pain and visual disturbance.   Respiratory: Negative for cough, chest tightness and shortness of breath.    Cardiovascular: Negative for chest pain and palpitations.   Gastrointestinal: Negative for abdominal pain, diarrhea, nausea and vomiting.   Genitourinary: Negative for dysuria and frequency.   Musculoskeletal: Positive for arthralgias, back pain and myalgias.   Skin: Negative for color change and rash.   Neurological: Negative for seizures and syncope.   Hematological: Bruises/bleeds easily.     Objective:     Vital Signs (Most Recent):  Temp: 98.5 °F (36.9 °C) (10/07/18 1117)  Pulse: 73 (10/07/18 1336)  Resp: 20 (10/07/18 1117)  BP: 128/60 (10/07/18 1336)  SpO2: 97 % (10/07/18 1117) Vital Signs (24h Range):  Temp:  [98.2 °F (36.8 °C)-98.7 °F (37.1 °C)] 98.5 °F (36.9 °C)  Pulse:  [] 73  Resp:  [16-20] 20  SpO2:  [97 %-100 %] 97 %  BP: (128-177)/(60-82) 128/60     Weight: 74.3 kg (163 lb 12.8 oz)  Body mass index is 31.99 kg/m².    Physical Exam   Constitutional: She is oriented to person, place, and time. She appears well-developed and well-nourished. No distress.   HENT:   Head: Normocephalic and atraumatic.   Eyes: EOM are normal. Pupils are equal, round, and reactive to light.   Neck: Normal range of motion. Neck supple.   Cardiovascular: Normal rate, regular rhythm, normal heart sounds and intact distal pulses.   Pulmonary/Chest: Effort normal and breath sounds normal. No respiratory distress.   Abdominal: Soft.  Bowel sounds are normal. She exhibits no distension. There is no tenderness.   Musculoskeletal: Normal range of motion. She exhibits edema.   Large bulky splint to RLE - sensory / motor intact    Neurological: She is alert and oriented to person, place, and time.   Skin: Skin is warm and dry. She is not diaphoretic.   Multiple areas of ecchymosis    Psychiatric: She has a normal mood and affect. Her behavior is normal.   Nursing note and vitals reviewed.        CRANIAL NERVES     CN III, IV, VI   Pupils are equal, round, and reactive to light.  Extraocular motions are normal.        Significant Labs:   CBC:   Recent Labs   Lab  10/06/18   0414  10/07/18   0444   WBC  7.20  7.38   HGB  8.0*  8.7*   HCT  25.9*  29.4*   PLT  213  238     CMP:   Recent Labs   Lab  10/05/18   1444  10/07/18   0444   NA  136  139   K  4.4  4.3   CL  101  104   CO2  25  27   GLU  472*  116*   BUN  23  17   CREATININE  1.2  0.9   CALCIUM  9.5  9.6   ANIONGAP  10  8   EGFRNONAA  48.2*  >60.0       Significant Imaging: I have reviewed all pertinent imaging results/findings within the past 24 hours.     Medical record, imaging, EKG, and medications reviewed     Review of Systems   Constitutional: Negative for chills, diaphoresis and fever.   Eyes: Negative for photophobia and visual disturbance.   Respiratory: Negative for cough, shortness of breath and wheezing.    Cardiovascular: Negative for palpitations and leg swelling.   Gastrointestinal: Negative for abdominal pain, constipation, diarrhea, nausea and vomiting.   Genitourinary: Negative for dysuria and flank pain.   Musculoskeletal: Positive for gait problem, joint swelling and myalgias. Negative for arthralgias.   Skin: Negative for rash and wound (open appy scar LLQ healing well).   Neurological: Positive for weakness. Negative for syncope and headaches.   Psychiatric/Behavioral: Negative for agitation, confusion and decreased concentration. The patient is not nervous/anxious.       Objective:     Vital Signs (Most Recent):  Temp: 98.5 °F (36.9 °C) (10/07/18 1117)  Pulse: 73 (10/07/18 1336)  Resp: 20 (10/07/18 1117)  BP: 128/60 (10/07/18 1336)  SpO2: 97 % (10/07/18 1117) Vital Signs (24h Range):  Temp:  [98.2 °F (36.8 °C)-98.7 °F (37.1 °C)] 98.5 °F (36.9 °C)  Pulse:  [] 73  Resp:  [16-20] 20  SpO2:  [97 %-100 %] 97 %  BP: (128-177)/(60-82) 128/60     Weight: 74.3 kg (163 lb 12.8 oz)  Body mass index is 31.99 kg/m².    Intake/Output Summary (Last 24 hours) at 10/7/2018 1422  Last data filed at 10/7/2018 0900  Gross per 24 hour   Intake 480 ml   Output 0 ml   Net 480 ml      Physical Exam   Constitutional: She is oriented to person, place, and time. She appears well-developed and well-nourished. No distress.   AAOx3 but distractible, poor concentration, somnolent   HENT:   Head: Normocephalic and atraumatic.   Eyes: Conjunctivae are normal. No scleral icterus.   Neck: Neck supple. No JVD present. No tracheal deviation present.   Cardiovascular: Normal rate, regular rhythm, normal heart sounds and intact distal pulses. Exam reveals no gallop and no friction rub.   Pulmonary/Chest: Effort normal and breath sounds normal. No stridor. No respiratory distress. She has no wheezes. She has no rales. She exhibits no tenderness.   Abdominal: Soft. Bowel sounds are normal. She exhibits no mass. There is no tenderness. There is no rebound and no guarding.   Musculoskeletal: She exhibits tenderness. She exhibits no edema or deformity.   R lower leg splint in place, leg elevated on pillow.   Neurological: She is alert and oriented to person, place, and time. No cranial nerve deficit or sensory deficit. Coordination normal.   Skin: Skin is warm and dry. She is not diaphoretic.   Psychiatric: She has a normal mood and affect. Her behavior is normal.   Vitals reviewed.      Significant Labs:   CBC:   Recent Labs   Lab  10/06/18   0414  10/07/18   0444   WBC  7.20  7.38   HGB  8.0*  8.7*   HCT  25.9*   29.4*   PLT  213  238     CMP:   Recent Labs   Lab  10/05/18   1444  10/07/18   0444   NA  136  139   K  4.4  4.3   CL  101  104   CO2  25  27   GLU  472*  116*   BUN  23  17   CREATININE  1.2  0.9   CALCIUM  9.5  9.6   ANIONGAP  10  8   EGFRNONAA  48.2*  >60.0     Recent Lab Results       10/07/18  0801 10/07/18  0444 10/06/18  2149 10/06/18  1746      Immature Granulocytes  0.8       Immature Grans (Abs)  0.06  Comment:  Mild elevation in immature granulocytes is non specific and   can be seen in a variety of conditions including stress response,   acute inflammation, trauma and pregnancy. Correlation with other   laboratory and clinical findings is essential.         Anion Gap  8       Baso #  0.02       Basophil%  0.3       BUN, Bld  17       Calcium  9.6       Chloride  104       CO2  27       Creatinine  0.9       Differential Method  Automated       eGFR if   >60.0       eGFR if non   >60.0  Comment:  Calculation used to obtain the estimated glomerular filtration  rate (eGFR) is the CKD-EPI equation.          Eos #  0.1       Eosinophil%  1.5       Glucose  116       Gran # (ANC)  5.0       Gran%  68.2       Hematocrit  29.4       Hemoglobin  8.7       Lymph #  1.7       Lymph%  23.6       MCH  26.4       MCHC  29.6       MCV  89       Mono #  0.4       Mono%  5.6       MPV  9.9       nRBC  0       Platelets  238       POCT Glucose 168  349 100     Potassium  4.3       RBC  3.30       RDW  19.5       Sodium  139       WBC  7.38             Significant Imaging: I have reviewed all pertinent imaging results/findings within the past 24 hours.    Assessment/Plan:      * Closed fracture of right tibia and fibula    Fall  - presents after fall from standing height following d/c home 2 days ago - no LOC or head trauma  - utilizes walker at baseline to assist with ambulation   - splint applied to RLE  - NWB to RLE  - PT/OT  - case management to assist with discharge planning   - PRN  pain management   - CT R knee confirms closed non-displaced fx of proximal tibia and fibula  - non-operative management per Ortho, splint in place  - Pt pending SNF as has proven dangerous to remain at home despite her preference due to deconditioning from previous hospital course in addition to new fracture.        COPD (chronic obstructive pulmonary disease)    - denies SOB / wheezing  - in NAD  - resume home medication regimen   - supplemental oxygen as needed to maintain saturations > 92%  - duo nebs PRN SOB / Wheezing   - Encourage CPAP at night especially on opioids for PRN pain, pt is reluctant though. Previous CO2 narcosis on admit with AMS requiring bipap                  Anemia    - Hgb 8.9 on arrival (baseline 9.2-9.5)  - transferrin trending upward at 126  - iron supplementation initiated   - trend h/h daily                    Essential hypertension    - Few occasions of SBP ranging between 130-181 since re-admit  - possibly elevated d/t discomfort  - resume home antihypertensive regimen   - monitor and adjust therapy as indicated if persistently elevated        CAD (coronary artery disease)    - denies CP / SOB  - EKG without obvious acute ischemic changes  - continue ASA and statin   - maintain on telemetry   - trend electrolytes and replete as indicated         Insulin dependent diabetes mellitus    24U detemir + 4U aspart with meals on prior discharge  BGL to 572 today as did not have regular insulin regimen charted on admit  Now on home regimen and making further insulin adjustments to optimize.           VTE Risk Mitigation (From admission, onward)        Ordered     enoxaparin injection 40 mg  Daily      10/04/18 1022     IP VTE HIGH RISK PATIENT  Once      10/04/18 0338     Place MARGIE hose  Until discontinued      10/04/18 0338     Place sequential compression device  Until discontinued      10/04/18 0338              Wayne Morris MD  Department of Hospital Medicine   [facility]  [place]

## 2018-10-07 NOTE — PLAN OF CARE
Problem: Patient Care Overview  Goal: Plan of Care Review  Outcome: Ongoing (interventions implemented as appropriate)  POC reviewed with patient. VSS. R leg elevated per orders, neurovascularly intact. Denied need for PRN pain medications. Accuchecks completed and covered per orders. (lunch BG was 328, did not doc). Using bedpan. Pt. Remains free from falls and trauma. Call light in reach. WCTM.

## 2018-10-07 NOTE — SUBJECTIVE & OBJECTIVE
Interval History:  YG, pt states she feels well other than leg. Continuing to adjust insulin regimen to optimize BGLs. Opioids have been held, as pt not using and pt prone to over-sedation with CO2 retention. Currently awaiting SNF placement.     Past Medical History:   Diagnosis Date    Asthma     Closed compression fracture of fourth lumbar vertebra     COPD (chronic obstructive pulmonary disease)     Coronary artery disease     Diabetes mellitus     Glaucoma     High cholesterol     Hypertension     Iritis     Pulmonary embolus     Stroke     rt sided weakness.       Past Surgical History:   Procedure Laterality Date    ABDOMINAL SURGERY      APPENDECTOMY N/A 8/26/2018    Procedure: APPENDECTOMY;  Surgeon: Bernadine Melendrez MD;  Location: [facility] OR;  Service: General;  Laterality: N/A;    APPENDECTOMY N/A 8/26/2018    Performed by Bernadine Melendrez MD at [facility] OR    APPENDECTOMY, LAPAROSCOPIC---CONVERTED TO OPEN APPENDECTOMY @0950 N/A 8/26/2018    Performed by Bernadine Melendrez MD at [facility] OR    BACK SURGERY      stimulator    CATARACT EXTRACTION      HYSTERECTOMY      LAPAROSCOPIC APPENDECTOMY N/A 8/26/2018    Procedure: APPENDECTOMY, LAPAROSCOPIC---CONVERTED TO OPEN APPENDECTOMY @0950;  Surgeon: Bernadine Melendrez MD;  Location: [facility] OR;  Service: General;  Laterality: N/A;       Review of patient's allergies indicates:   Allergen Reactions    Ace inhibitors Swelling    Corticosteroids (glucocorticoids)     Hydralazine analogues     Tetracyclines Swelling    Travatan (with benzalkonium) [travoprost (benzalkonium)]        No current facility-administered medications on file prior to encounter.      Current Outpatient Medications on File Prior to Encounter   Medication Sig    acetaminophen (TYLENOL) 325 MG tablet Take 2 tablets (650 mg total) by mouth every 6 (six) hours as needed for Pain.    albuterol-ipratropium (DUO-NEB) 2.5 mg-0.5 mg/3 mL nebulizer solution Take 3 mLs by  nebulization every 4 (four) hours. Rescue    aspirin (ECOTRIN) 81 MG EC tablet Take 1 tablet (81 mg total) by mouth once daily.    cloNIDine (CATAPRES) 0.3 MG tablet Take 1 tablet (0.3 mg total) by mouth 3 (three) times daily.    clopidogrel (PLAVIX) 75 mg tablet Take 75 mg by mouth once daily.    diltiaZEM (CARDIZEM CD) 240 MG 24 hr capsule Take 1 capsule (240 mg total) by mouth once daily.    DULoxetine (CYMBALTA) 60 MG capsule Take 60 mg by mouth once daily.    fluticasone-vilanterol (BREO) 100-25 mcg/dose diskus inhaler Inhale 1 puff into the lungs once daily. Controller    furosemide (LASIX) 40 MG tablet Take 40 mg by mouth once daily.     hydrALAZINE (APRESOLINE) 25 MG tablet Take 1 tablet (25 mg total) by mouth every 8 (eight) hours.    insulin detemir U-100 (LEVEMIR FLEXTOUCH) 100 unit/mL (3 mL) SubQ InPn pen Inject 24 Units into the skin once daily.    insulin lispro (HUMALOG KWIKPEN) 100 unit/mL InPn pen Inject 3 Units into the skin 3 (three) times daily with meals.    Lactobacillus acidophilus 1 billion cell Cap Take 1 tablet by mouth 2 (two) times daily.    losartan (COZAAR) 100 MG tablet Take 100 mg by mouth once daily.     megestrol (MEGACE) 40 MG Tab Take 40 mg by mouth 2 (two) times daily.    ondansetron (ZOFRAN) 4 MG tablet Take 1 tablet (4 mg total) by mouth every 6 (six) hours as needed.    pantoprazole (PROTONIX) 40 MG tablet Take 40 mg by mouth once daily.    predniSONE (DELTASONE) 10 MG tablet Take 10 mg by mouth once daily.     pyridoxine, vitamin B6, (VITAMIN B-6) 50 MG Tab Take 1 tablet (50 mg total) by mouth once daily.    senna-docusate 8.6-50 mg (SENNA WITH DOCUSATE SODIUM) 8.6-50 mg per tablet Take 1 tablet by mouth 2 (two) times daily.    simethicone 80 mg Tab Take 160 mg by mouth every 6 (six) hours as needed for Flatulence.    simvastatin (ZOCOR) 40 MG tablet Take 0.5 tablets (20 mg total) by mouth every evening.     Family History     Problem Relation (Age of  Onset)    Cancer Father    Diabetes Brother    Lupus Sister    Multiple sclerosis Mother        Tobacco Use    Smoking status: Never Smoker   Substance and Sexual Activity    Alcohol use: No     Frequency: Never    Drug use: No    Sexual activity: No     Partners: Male     Review of Systems   Constitutional: Negative for chills and fever.   HENT: Negative for congestion and sore throat.    Eyes: Negative for pain and visual disturbance.   Respiratory: Negative for cough, chest tightness and shortness of breath.    Cardiovascular: Negative for chest pain and palpitations.   Gastrointestinal: Negative for abdominal pain, diarrhea, nausea and vomiting.   Genitourinary: Negative for dysuria and frequency.   Musculoskeletal: Positive for arthralgias, back pain and myalgias.   Skin: Negative for color change and rash.   Neurological: Negative for seizures and syncope.   Hematological: Bruises/bleeds easily.     Objective:     Vital Signs (Most Recent):  Temp: 98.5 °F (36.9 °C) (10/07/18 1117)  Pulse: 73 (10/07/18 1336)  Resp: 20 (10/07/18 1117)  BP: 128/60 (10/07/18 1336)  SpO2: 97 % (10/07/18 1117) Vital Signs (24h Range):  Temp:  [98.2 °F (36.8 °C)-98.7 °F (37.1 °C)] 98.5 °F (36.9 °C)  Pulse:  [] 73  Resp:  [16-20] 20  SpO2:  [97 %-100 %] 97 %  BP: (128-177)/(60-82) 128/60     Weight: 74.3 kg (163 lb 12.8 oz)  Body mass index is 31.99 kg/m².    Physical Exam   Constitutional: She is oriented to person, place, and time. She appears well-developed and well-nourished. No distress.   HENT:   Head: Normocephalic and atraumatic.   Eyes: EOM are normal. Pupils are equal, round, and reactive to light.   Neck: Normal range of motion. Neck supple.   Cardiovascular: Normal rate, regular rhythm, normal heart sounds and intact distal pulses.   Pulmonary/Chest: Effort normal and breath sounds normal. No respiratory distress.   Abdominal: Soft. Bowel sounds are normal. She exhibits no distension. There is no tenderness.    Musculoskeletal: Normal range of motion. She exhibits edema.   Large bulky splint to RLE - sensory / motor intact    Neurological: She is alert and oriented to person, place, and time.   Skin: Skin is warm and dry. She is not diaphoretic.   Multiple areas of ecchymosis    Psychiatric: She has a normal mood and affect. Her behavior is normal.   Nursing note and vitals reviewed.        CRANIAL NERVES     CN III, IV, VI   Pupils are equal, round, and reactive to light.  Extraocular motions are normal.        Significant Labs:   CBC:   Recent Labs   Lab  10/06/18   0414  10/07/18   0444   WBC  7.20  7.38   HGB  8.0*  8.7*   HCT  25.9*  29.4*   PLT  213  238     CMP:   Recent Labs   Lab  10/05/18   1444  10/07/18   0444   NA  136  139   K  4.4  4.3   CL  101  104   CO2  25  27   GLU  472*  116*   BUN  23  17   CREATININE  1.2  0.9   CALCIUM  9.5  9.6   ANIONGAP  10  8   EGFRNONAA  48.2*  >60.0       Significant Imaging: I have reviewed all pertinent imaging results/findings within the past 24 hours.     Medical record, imaging, EKG, and medications reviewed     Review of Systems   Constitutional: Negative for chills, diaphoresis and fever.   Eyes: Negative for photophobia and visual disturbance.   Respiratory: Negative for cough, shortness of breath and wheezing.    Cardiovascular: Negative for palpitations and leg swelling.   Gastrointestinal: Negative for abdominal pain, constipation, diarrhea, nausea and vomiting.   Genitourinary: Negative for dysuria and flank pain.   Musculoskeletal: Positive for gait problem, joint swelling and myalgias. Negative for arthralgias.   Skin: Negative for rash and wound (open appy scar LLQ healing well).   Neurological: Positive for weakness. Negative for syncope and headaches.   Psychiatric/Behavioral: Negative for agitation, confusion and decreased concentration. The patient is not nervous/anxious.      Objective:     Vital Signs (Most Recent):  Temp: 98.5 °F (36.9 °C) (10/07/18  1117)  Pulse: 73 (10/07/18 1336)  Resp: 20 (10/07/18 1117)  BP: 128/60 (10/07/18 1336)  SpO2: 97 % (10/07/18 1117) Vital Signs (24h Range):  Temp:  [98.2 °F (36.8 °C)-98.7 °F (37.1 °C)] 98.5 °F (36.9 °C)  Pulse:  [] 73  Resp:  [16-20] 20  SpO2:  [97 %-100 %] 97 %  BP: (128-177)/(60-82) 128/60     Weight: 74.3 kg (163 lb 12.8 oz)  Body mass index is 31.99 kg/m².    Intake/Output Summary (Last 24 hours) at 10/7/2018 1422  Last data filed at 10/7/2018 0900  Gross per 24 hour   Intake 480 ml   Output 0 ml   Net 480 ml      Physical Exam   Constitutional: She is oriented to person, place, and time. She appears well-developed and well-nourished. No distress.   AAOx3 but distractible, poor concentration, somnolent   HENT:   Head: Normocephalic and atraumatic.   Eyes: Conjunctivae are normal. No scleral icterus.   Neck: Neck supple. No JVD present. No tracheal deviation present.   Cardiovascular: Normal rate, regular rhythm, normal heart sounds and intact distal pulses. Exam reveals no gallop and no friction rub.   Pulmonary/Chest: Effort normal and breath sounds normal. No stridor. No respiratory distress. She has no wheezes. She has no rales. She exhibits no tenderness.   Abdominal: Soft. Bowel sounds are normal. She exhibits no mass. There is no tenderness. There is no rebound and no guarding.   Musculoskeletal: She exhibits tenderness. She exhibits no edema or deformity.   R lower leg splint in place, leg elevated on pillow.   Neurological: She is alert and oriented to person, place, and time. No cranial nerve deficit or sensory deficit. Coordination normal.   Skin: Skin is warm and dry. She is not diaphoretic.   Psychiatric: She has a normal mood and affect. Her behavior is normal.   Vitals reviewed.      Significant Labs:   CBC:   Recent Labs   Lab  10/06/18   0414  10/07/18   0444   WBC  7.20  7.38   HGB  8.0*  8.7*   HCT  25.9*  29.4*   PLT  213  238     CMP:   Recent Labs   Lab  10/05/18   1444  10/07/18    0444   NA  136  139   K  4.4  4.3   CL  101  104   CO2  25  27   GLU  472*  116*   BUN  23  17   CREATININE  1.2  0.9   CALCIUM  9.5  9.6   ANIONGAP  10  8   EGFRNONAA  48.2*  >60.0     Recent Lab Results       10/07/18  0801 10/07/18  0444 10/06/18  2149 10/06/18  1746      Immature Granulocytes  0.8       Immature Grans (Abs)  0.06  Comment:  Mild elevation in immature granulocytes is non specific and   can be seen in a variety of conditions including stress response,   acute inflammation, trauma and pregnancy. Correlation with other   laboratory and clinical findings is essential.         Anion Gap  8       Baso #  0.02       Basophil%  0.3       BUN, Bld  17       Calcium  9.6       Chloride  104       CO2  27       Creatinine  0.9       Differential Method  Automated       eGFR if   >60.0       eGFR if non   >60.0  Comment:  Calculation used to obtain the estimated glomerular filtration  rate (eGFR) is the CKD-EPI equation.          Eos #  0.1       Eosinophil%  1.5       Glucose  116       Gran # (ANC)  5.0       Gran%  68.2       Hematocrit  29.4       Hemoglobin  8.7       Lymph #  1.7       Lymph%  23.6       MCH  26.4       MCHC  29.6       MCV  89       Mono #  0.4       Mono%  5.6       MPV  9.9       nRBC  0       Platelets  238       POCT Glucose 168  349 100     Potassium  4.3       RBC  3.30       RDW  19.5       Sodium  139       WBC  7.38             Significant Imaging: I have reviewed all pertinent imaging results/findings within the past 24 hours.

## 2018-10-07 NOTE — ASSESSMENT & PLAN NOTE
- Few occasions of SBP ranging between 130-181 since re-admit  - possibly elevated d/t discomfort  - resume home antihypertensive regimen   - monitor and adjust therapy as indicated if persistently elevated

## 2018-10-07 NOTE — PLAN OF CARE
Problem: Patient Care Overview  Goal: Plan of Care Review  Outcome: Ongoing (interventions implemented as appropriate)  POC reviewed with patient; understanding verbalized. AAO. VSS on room air. Denies pain; scheduled tylenol given. Accu checks q4. Diabetic diet; no complaints of nausea or vomiting at this time.  RLE remained elevated overnight.  Adequate urine output during shift. No BM at this time. No falls or acute events at this time. Bed in low position, call light placed within reach. Frequent rounds made for patient safety. Nurse will continue to monitor.

## 2018-10-07 NOTE — ASSESSMENT & PLAN NOTE
24U detemir + 4U aspart with meals on prior discharge  BGL to 572 today as did not have regular insulin regimen charted on admit  Now on home regimen and making further insulin adjustments to optimize.

## 2018-10-08 LAB
ANION GAP SERPL CALC-SCNC: 9 MMOL/L
BASOPHILS # BLD AUTO: 0.03 K/UL
BASOPHILS NFR BLD: 0.4 %
BUN SERPL-MCNC: 15 MG/DL
CALCIUM SERPL-MCNC: 9.1 MG/DL
CHLORIDE SERPL-SCNC: 100 MMOL/L
CO2 SERPL-SCNC: 29 MMOL/L
CREAT SERPL-MCNC: 1 MG/DL
DIFFERENTIAL METHOD: ABNORMAL
EOSINOPHIL # BLD AUTO: 0.1 K/UL
EOSINOPHIL NFR BLD: 1.6 %
ERYTHROCYTE [DISTWIDTH] IN BLOOD BY AUTOMATED COUNT: 19 %
EST. GFR  (AFRICAN AMERICAN): >60 ML/MIN/1.73 M^2
EST. GFR  (NON AFRICAN AMERICAN): >60 ML/MIN/1.73 M^2
GLUCOSE SERPL-MCNC: 165 MG/DL
HCT VFR BLD AUTO: 28.1 %
HGB BLD-MCNC: 8.2 G/DL
IMM GRANULOCYTES # BLD AUTO: 0.08 K/UL
IMM GRANULOCYTES NFR BLD AUTO: 1 %
LYMPHOCYTES # BLD AUTO: 2 K/UL
LYMPHOCYTES NFR BLD: 25.7 %
MAGNESIUM SERPL-MCNC: 1.8 MG/DL
MCH RBC QN AUTO: 25.8 PG
MCHC RBC AUTO-ENTMCNC: 29.2 G/DL
MCV RBC AUTO: 88 FL
MONOCYTES # BLD AUTO: 0.5 K/UL
MONOCYTES NFR BLD: 5.9 %
NEUTROPHILS # BLD AUTO: 5.1 K/UL
NEUTROPHILS NFR BLD: 65.4 %
NRBC BLD-RTO: 0 /100 WBC
PHOSPHATE SERPL-MCNC: 2.8 MG/DL
PLATELET # BLD AUTO: 245 K/UL
PMV BLD AUTO: 9.9 FL
POCT GLUCOSE: 193 MG/DL (ref 70–110)
POCT GLUCOSE: 206 MG/DL (ref 70–110)
POCT GLUCOSE: 244 MG/DL (ref 70–110)
POCT GLUCOSE: 294 MG/DL (ref 70–110)
POCT GLUCOSE: 301 MG/DL (ref 70–110)
POCT GLUCOSE: 344 MG/DL (ref 70–110)
POTASSIUM SERPL-SCNC: 4.1 MMOL/L
RBC # BLD AUTO: 3.18 M/UL
SODIUM SERPL-SCNC: 138 MMOL/L
WBC # BLD AUTO: 7.74 K/UL

## 2018-10-08 PROCEDURE — 63600175 PHARM REV CODE 636 W HCPCS: Performed by: STUDENT IN AN ORGANIZED HEALTH CARE EDUCATION/TRAINING PROGRAM

## 2018-10-08 PROCEDURE — 83735 ASSAY OF MAGNESIUM: CPT

## 2018-10-08 PROCEDURE — G0378 HOSPITAL OBSERVATION PER HR: HCPCS

## 2018-10-08 PROCEDURE — 97110 THERAPEUTIC EXERCISES: CPT

## 2018-10-08 PROCEDURE — 25000003 PHARM REV CODE 250: Performed by: NURSE PRACTITIONER

## 2018-10-08 PROCEDURE — 82962 GLUCOSE BLOOD TEST: CPT

## 2018-10-08 PROCEDURE — 85025 COMPLETE CBC W/AUTO DIFF WBC: CPT

## 2018-10-08 PROCEDURE — 80048 BASIC METABOLIC PNL TOTAL CA: CPT

## 2018-10-08 PROCEDURE — 97530 THERAPEUTIC ACTIVITIES: CPT

## 2018-10-08 PROCEDURE — G8987 SELF CARE CURRENT STATUS: HCPCS | Mod: CK

## 2018-10-08 PROCEDURE — 63600175 PHARM REV CODE 636 W HCPCS: Performed by: NURSE PRACTITIONER

## 2018-10-08 PROCEDURE — 84100 ASSAY OF PHOSPHORUS: CPT

## 2018-10-08 PROCEDURE — 99224 PR SUBSEQUENT OBSERVATION CARE,LEVEL I: CPT | Mod: GC,,, | Performed by: HOSPITALIST

## 2018-10-08 PROCEDURE — 25000003 PHARM REV CODE 250: Performed by: STUDENT IN AN ORGANIZED HEALTH CARE EDUCATION/TRAINING PROGRAM

## 2018-10-08 PROCEDURE — 36415 COLL VENOUS BLD VENIPUNCTURE: CPT

## 2018-10-08 PROCEDURE — 97535 SELF CARE MNGMENT TRAINING: CPT | Mod: 59

## 2018-10-08 PROCEDURE — G8988 SELF CARE GOAL STATUS: HCPCS | Mod: CI

## 2018-10-08 RX ORDER — INSULIN ASPART 100 [IU]/ML
10 INJECTION, SOLUTION INTRAVENOUS; SUBCUTANEOUS
Status: DISCONTINUED | OUTPATIENT
Start: 2018-10-08 | End: 2018-10-09

## 2018-10-08 RX ADMIN — TRAMADOL HYDROCHLORIDE 50 MG: 50 TABLET, FILM COATED ORAL at 10:10

## 2018-10-08 RX ADMIN — FLUTICASONE FUROATE AND VILANTEROL TRIFENATATE 1 PUFF: 100; 25 POWDER RESPIRATORY (INHALATION) at 09:10

## 2018-10-08 RX ADMIN — HYDRALAZINE HYDROCHLORIDE 25 MG: 25 TABLET ORAL at 06:10

## 2018-10-08 RX ADMIN — LOSARTAN POTASSIUM 100 MG: 50 TABLET ORAL at 09:10

## 2018-10-08 RX ADMIN — TRAMADOL HYDROCHLORIDE 50 MG: 50 TABLET, FILM COATED ORAL at 05:10

## 2018-10-08 RX ADMIN — PREDNISONE 10 MG: 10 TABLET ORAL at 09:10

## 2018-10-08 RX ADMIN — MEGESTROL ACETATE 40 MG: 40 TABLET ORAL at 09:10

## 2018-10-08 RX ADMIN — ASPIRIN 81 MG: 81 TABLET, COATED ORAL at 09:10

## 2018-10-08 RX ADMIN — INSULIN ASPART 10 UNITS: 100 INJECTION, SOLUTION INTRAVENOUS; SUBCUTANEOUS at 06:10

## 2018-10-08 RX ADMIN — FUROSEMIDE 40 MG: 40 TABLET ORAL at 09:10

## 2018-10-08 RX ADMIN — INSULIN ASPART 4 UNITS: 100 INJECTION, SOLUTION INTRAVENOUS; SUBCUTANEOUS at 01:10

## 2018-10-08 RX ADMIN — ENOXAPARIN SODIUM 40 MG: 100 INJECTION SUBCUTANEOUS at 05:10

## 2018-10-08 RX ADMIN — SIMVASTATIN 20 MG: 20 TABLET, FILM COATED ORAL at 09:10

## 2018-10-08 RX ADMIN — ACETAMINOPHEN 650 MG: 325 TABLET, FILM COATED ORAL at 09:10

## 2018-10-08 RX ADMIN — CLONIDINE HYDROCHLORIDE 0.3 MG: 0.2 TABLET ORAL at 09:10

## 2018-10-08 RX ADMIN — INSULIN ASPART 10 UNITS: 100 INJECTION, SOLUTION INTRAVENOUS; SUBCUTANEOUS at 09:10

## 2018-10-08 RX ADMIN — ACETAMINOPHEN 650 MG: 325 TABLET, FILM COATED ORAL at 05:10

## 2018-10-08 RX ADMIN — HYDRALAZINE HYDROCHLORIDE 25 MG: 25 TABLET ORAL at 09:10

## 2018-10-08 RX ADMIN — PANTOPRAZOLE SODIUM 40 MG: 40 TABLET, DELAYED RELEASE ORAL at 09:10

## 2018-10-08 RX ADMIN — CLOPIDOGREL 75 MG: 75 TABLET, FILM COATED ORAL at 09:10

## 2018-10-08 RX ADMIN — SENNOSIDES AND DOCUSATE SODIUM 1 TABLET: 8.6; 5 TABLET ORAL at 09:10

## 2018-10-08 RX ADMIN — ACETAMINOPHEN 650 MG: 325 TABLET, FILM COATED ORAL at 01:10

## 2018-10-08 RX ADMIN — FERROUS SULFATE TAB EC 325 MG (65 MG FE EQUIVALENT) 325 MG: 325 (65 FE) TABLET DELAYED RESPONSE at 03:10

## 2018-10-08 RX ADMIN — HYDRALAZINE HYDROCHLORIDE 25 MG: 25 TABLET ORAL at 01:10

## 2018-10-08 RX ADMIN — DULOXETINE HYDROCHLORIDE 60 MG: 30 CAPSULE, DELAYED RELEASE ORAL at 09:10

## 2018-10-08 RX ADMIN — INSULIN ASPART 2 UNITS: 100 INJECTION, SOLUTION INTRAVENOUS; SUBCUTANEOUS at 06:10

## 2018-10-08 RX ADMIN — INSULIN ASPART 1 UNITS: 100 INJECTION, SOLUTION INTRAVENOUS; SUBCUTANEOUS at 09:10

## 2018-10-08 RX ADMIN — DILTIAZEM HYDROCHLORIDE 240 MG: 240 CAPSULE, COATED, EXTENDED RELEASE ORAL at 09:10

## 2018-10-08 RX ADMIN — INSULIN ASPART 10 UNITS: 100 INJECTION, SOLUTION INTRAVENOUS; SUBCUTANEOUS at 01:10

## 2018-10-08 RX ADMIN — CLONIDINE HYDROCHLORIDE 0.3 MG: 0.2 TABLET ORAL at 03:10

## 2018-10-08 NOTE — PROGRESS NOTES
Dr. Morris aware pt is c/o 8/10 left leg pain post therapy session. MD aware pt is not due for tramadol until 12pm and acetaminophen until 2pm. MD stated it was okay for pt to have an earlier dose of tramadol

## 2018-10-08 NOTE — PLAN OF CARE
10/08/18 1649   Discharge Reassessment   Assessment Type Discharge Planning Reassessment   Provided patient/caregiver education on the expected discharge date and the discharge plan Yes   Do you have any problems affording any of your prescribed medications? No   Discharge Plan A Skilled Nursing Facility   Discharge Plan B Home with family;Home Health   Can the patient answer the patient profile reliably? Yes, cognitively intact   Describe the patient's ability to walk at the present time. Does not walk or unable to take any steps at all   How often would a person be available to care for the patient? Occasionally   Number of comorbid conditions (as recorded on the chart) Five or more

## 2018-10-08 NOTE — PLAN OF CARE
Problem: Occupational Therapy Goal  Goal: Occupational Therapy Goal  Goals to be met by: 10/18/18     Patient will increase functional independence with ADLs by performing:    UE Dressing with Set-up Assistance and Supervision.  LE Dressing with Minimal Assistance, with modification/AE prn  Grooming while seated with Supervision. Met 10/8  Toileting from bedside commode with Moderate Assistance for hygiene and clothing management.   Toilet transfer to bedside commode with Minimal Assistance.     Continue OT POC     Comments: Walter Abreu OTR/L  10/8/2018

## 2018-10-08 NOTE — NURSING
Awake. Alert. Oriented x4.  No complaints of chest pain or shortness of breath.  Right leg in a cast and elevated.  Voided several times this shift per bedpan.  Turned, repositioned and major-care given frequently.  At 0551 Tramadol given for complaint of pain from turning frequently.  Uneventful night.

## 2018-10-08 NOTE — PROGRESS NOTES
[facility] Hospital Medicine  Progress Note    Patient Name: Sheryl Martines  MRN: [id number]  Patient Class: OP- Observation   Admission Date: 10/3/2018  Length of Stay: 0 days  Attending Physician: Servando García, *  Primary Care Provider: [facility] Medicine Team: [facility] 1 Wayne Morris MD    Subjective:     Principal Problem:Closed fracture of right tibia and fibula    HPI:  64 y/o female, who presents to the ED with R knee pain after fall from standing height.  She has a PMH of CVA, COPD, CAD, HTN, DM, HLD, and asthma.  She states that she was walking to her front step when her knees buckled, and she fell landing on her knees.  She reports using a walker at baseline to assist with ambulation.  She denies LOC or head trauma.  She was in substantial pain while in ED and received 5mg oxycodone IR, 15mg morphine PO, and 6mg morphine IM.  Imaging reveals nondisplaced right proximal tibial / fibular fractures.  She was evaluated by orthopedics while in ED and a splint was applied.     Recently admitted from SNF on 09/23/18 for AMS r/t possible Co2 narcosis, and was discharged home with home health on 10/2/2018.     Hospital Course:  Pt D/C home 2 days ago after prolonged stay in SNF and hospital for open appy complicated by infection and CO2 narcosis associated with AMS. Pt was discharged mentating and baseline and without signs of infection. It was recommended she d/c to SNF for rehab and additional care due to her deconditioning but pt was adamant about wanting to return home. Pt knees gave out while ambulating outside her home and collapsed to knees and then falling on her side, resulting in closed, non-displaced proximal fracture of the R tibia and fibula    Knee xray and CT knee demonstrate non-displaced fractures of the proximal tibia and fibula. For non-operative management per ortho. Leg splinted in ED. Other than her new fracture her medical condition is as  per recent hospital d/c without new issues or deterioration. Awaiting SNF placement    Interval History:  janki RONQUILLO states she feels well other than intermittent leg pain 2/2 fracture. Continuing to adjust insulin regimen to optimize BGLs. PRN tylenol and tramadol for pain, Judicious use opioids 2/2 hx opioid-induced oversedation with CO2 narcosis. Currently awaiting SNF placement.     Past Medical History:   Diagnosis Date    Asthma     Closed compression fracture of fourth lumbar vertebra     COPD (chronic obstructive pulmonary disease)     Coronary artery disease     Diabetes mellitus     Glaucoma     High cholesterol     Hypertension     Iritis     Pulmonary embolus     Stroke     rt sided weakness.       Past Surgical History:   Procedure Laterality Date    ABDOMINAL SURGERY      APPENDECTOMY N/A 8/26/2018    Procedure: APPENDECTOMY;  Surgeon: Bernadine Melendrez MD;  Location: [facility] OR;  Service: General;  Laterality: N/A;    APPENDECTOMY N/A 8/26/2018    Performed by Bernadine Melendrez MD at [facility] OR    APPENDECTOMY, LAPAROSCOPIC---CONVERTED TO OPEN APPENDECTOMY @0950 N/A 8/26/2018    Performed by Bernadine Melendrez MD at [facility] OR    BACK SURGERY      stimulator    CATARACT EXTRACTION      HYSTERECTOMY      LAPAROSCOPIC APPENDECTOMY N/A 8/26/2018    Procedure: APPENDECTOMY, LAPAROSCOPIC---CONVERTED TO OPEN APPENDECTOMY @0950;  Surgeon: Bernadine Melendrez MD;  Location: [facility] OR;  Service: General;  Laterality: N/A;       Review of patient's allergies indicates:   Allergen Reactions    Ace inhibitors Swelling    Corticosteroids (glucocorticoids)     Hydralazine analogues     Tetracyclines Swelling    Travatan (with benzalkonium) [travoprost (benzalkonium)]        No current facility-administered medications on file prior to encounter.      Current Outpatient Medications on File Prior to Encounter   Medication Sig    acetaminophen (TYLENOL) 325 MG tablet Take 2 tablets (650 mg total)  by mouth every 6 (six) hours as needed for Pain.    albuterol-ipratropium (DUO-NEB) 2.5 mg-0.5 mg/3 mL nebulizer solution Take 3 mLs by nebulization every 4 (four) hours. Rescue    aspirin (ECOTRIN) 81 MG EC tablet Take 1 tablet (81 mg total) by mouth once daily.    cloNIDine (CATAPRES) 0.3 MG tablet Take 1 tablet (0.3 mg total) by mouth 3 (three) times daily.    clopidogrel (PLAVIX) 75 mg tablet Take 75 mg by mouth once daily.    diltiaZEM (CARDIZEM CD) 240 MG 24 hr capsule Take 1 capsule (240 mg total) by mouth once daily.    DULoxetine (CYMBALTA) 60 MG capsule Take 60 mg by mouth once daily.    fluticasone-vilanterol (BREO) 100-25 mcg/dose diskus inhaler Inhale 1 puff into the lungs once daily. Controller    furosemide (LASIX) 40 MG tablet Take 40 mg by mouth once daily.     hydrALAZINE (APRESOLINE) 25 MG tablet Take 1 tablet (25 mg total) by mouth every 8 (eight) hours.    insulin detemir U-100 (LEVEMIR FLEXTOUCH) 100 unit/mL (3 mL) SubQ InPn pen Inject 24 Units into the skin once daily.    insulin lispro (HUMALOG KWIKPEN) 100 unit/mL InPn pen Inject 3 Units into the skin 3 (three) times daily with meals.    Lactobacillus acidophilus 1 billion cell Cap Take 1 tablet by mouth 2 (two) times daily.    losartan (COZAAR) 100 MG tablet Take 100 mg by mouth once daily.     megestrol (MEGACE) 40 MG Tab Take 40 mg by mouth 2 (two) times daily.    ondansetron (ZOFRAN) 4 MG tablet Take 1 tablet (4 mg total) by mouth every 6 (six) hours as needed.    pantoprazole (PROTONIX) 40 MG tablet Take 40 mg by mouth once daily.    predniSONE (DELTASONE) 10 MG tablet Take 10 mg by mouth once daily.     pyridoxine, vitamin B6, (VITAMIN B-6) 50 MG Tab Take 1 tablet (50 mg total) by mouth once daily.    senna-docusate 8.6-50 mg (SENNA WITH DOCUSATE SODIUM) 8.6-50 mg per tablet Take 1 tablet by mouth 2 (two) times daily.    simethicone 80 mg Tab Take 160 mg by mouth every 6 (six) hours as needed for Flatulence.     simvastatin (ZOCOR) 40 MG tablet Take 0.5 tablets (20 mg total) by mouth every evening.     Family History     Problem Relation (Age of Onset)    Cancer Father    Diabetes Brother    Lupus Sister    Multiple sclerosis Mother        Tobacco Use    Smoking status: Never Smoker   Substance and Sexual Activity    Alcohol use: No     Frequency: Never    Drug use: No    Sexual activity: No     Partners: Male     Review of Systems   Constitutional: Negative for chills and fever.   HENT: Negative for congestion and sore throat.    Eyes: Negative for pain and visual disturbance.   Respiratory: Negative for cough, chest tightness and shortness of breath.    Cardiovascular: Negative for chest pain and palpitations.   Gastrointestinal: Negative for abdominal pain, diarrhea, nausea and vomiting.   Genitourinary: Negative for dysuria and frequency.   Musculoskeletal: Positive for arthralgias, back pain and myalgias.   Skin: Negative for color change and rash.   Neurological: Negative for seizures and syncope.   Hematological: Bruises/bleeds easily.     Objective:     Vital Signs (Most Recent):  Temp: 97.7 °F (36.5 °C) (10/08/18 1211)  Pulse: 65 (10/08/18 1400)  Resp: 16 (10/08/18 1211)  BP: 132/63 (10/08/18 1211)  SpO2: (!) 92 % (10/08/18 1211) Vital Signs (24h Range):  Temp:  [96.7 °F (35.9 °C)-97.9 °F (36.6 °C)] 97.7 °F (36.5 °C)  Pulse:  [60-87] 65  Resp:  [16-20] 16  SpO2:  [92 %-100 %] 92 %  BP: (118-144)/(63-75) 132/63     Weight: 74.3 kg (163 lb 12.8 oz)  Body mass index is 31.99 kg/m².    Physical Exam   Constitutional: She is oriented to person, place, and time. She appears well-developed and well-nourished. No distress.   HENT:   Head: Normocephalic and atraumatic.   Eyes: EOM are normal. Pupils are equal, round, and reactive to light.   Neck: Normal range of motion. Neck supple.   Cardiovascular: Normal rate, regular rhythm, normal heart sounds and intact distal pulses.   Pulmonary/Chest: Effort normal and breath  sounds normal. No respiratory distress.   Abdominal: Soft. Bowel sounds are normal. She exhibits no distension. There is no tenderness.   Musculoskeletal: Normal range of motion. She exhibits edema.   Large bulky splint to RLE - sensory / motor intact    Neurological: She is alert and oriented to person, place, and time.   Skin: Skin is warm and dry. She is not diaphoretic.   Multiple areas of ecchymosis    Psychiatric: She has a normal mood and affect. Her behavior is normal.   Nursing note and vitals reviewed.        CRANIAL NERVES     CN III, IV, VI   Pupils are equal, round, and reactive to light.  Extraocular motions are normal.        Significant Labs:   CBC:   Recent Labs   Lab  10/07/18   0444  10/08/18   0500   WBC  7.38  7.74   HGB  8.7*  8.2*   HCT  29.4*  28.1*   PLT  238  245     CMP:   Recent Labs   Lab  10/07/18   0444  10/08/18   0500   NA  139  138   K  4.3  4.1   CL  104  100   CO2  27  29   GLU  116*  165*   BUN  17  15   CREATININE  0.9  1.0   CALCIUM  9.6  9.1   ANIONGAP  8  9   EGFRNONAA  >60.0  >60.0       Significant Imaging: I have reviewed all pertinent imaging results/findings within the past 24 hours.     Medical record, imaging, EKG, and medications reviewed     Review of Systems   Constitutional: Negative for chills, diaphoresis and fever.   Eyes: Negative for photophobia and visual disturbance.   Respiratory: Negative for cough, shortness of breath and wheezing.    Cardiovascular: Negative for palpitations and leg swelling.   Gastrointestinal: Negative for abdominal pain, constipation, diarrhea, nausea and vomiting.   Genitourinary: Negative for dysuria and flank pain.   Musculoskeletal: Positive for gait problem, joint swelling and myalgias. Negative for arthralgias.   Skin: Negative for rash and wound (open appy scar LLQ healing well).   Neurological: Positive for weakness. Negative for syncope and headaches.   Psychiatric/Behavioral: Negative for agitation, confusion and decreased  concentration. The patient is not nervous/anxious.      Objective:     Vital Signs (Most Recent):  Temp: 97.7 °F (36.5 °C) (10/08/18 1211)  Pulse: 65 (10/08/18 1400)  Resp: 16 (10/08/18 1211)  BP: 132/63 (10/08/18 1211)  SpO2: (!) 92 % (10/08/18 1211) Vital Signs (24h Range):  Temp:  [96.7 °F (35.9 °C)-97.9 °F (36.6 °C)] 97.7 °F (36.5 °C)  Pulse:  [60-87] 65  Resp:  [16-20] 16  SpO2:  [92 %-100 %] 92 %  BP: (118-144)/(63-75) 132/63     Weight: 74.3 kg (163 lb 12.8 oz)  Body mass index is 31.99 kg/m².  No intake or output data in the 24 hours ending 10/08/18 1532   Physical Exam   Constitutional: She is oriented to person, place, and time. She appears well-developed and well-nourished. No distress.   AAOx3 but distractible, poor concentration, somnolent   HENT:   Head: Normocephalic and atraumatic.   Eyes: Conjunctivae are normal. No scleral icterus.   Neck: Neck supple. No JVD present. No tracheal deviation present.   Cardiovascular: Normal rate, regular rhythm, normal heart sounds and intact distal pulses. Exam reveals no gallop and no friction rub.   Pulmonary/Chest: Effort normal and breath sounds normal. No stridor. No respiratory distress. She has no wheezes. She has no rales. She exhibits no tenderness.   Abdominal: Soft. Bowel sounds are normal. She exhibits no mass. There is no tenderness. There is no rebound and no guarding.   Musculoskeletal: She exhibits tenderness. She exhibits no edema or deformity.   R lower leg splint in place, leg elevated on pillow.   Neurological: She is alert and oriented to person, place, and time. No cranial nerve deficit or sensory deficit. Coordination normal.   Skin: Skin is warm and dry. She is not diaphoretic.   Psychiatric: She has a normal mood and affect. Her behavior is normal.   Vitals reviewed.      Significant Labs:   CBC:   Recent Labs   Lab  10/07/18   0444  10/08/18   0500   WBC  7.38  7.74   HGB  8.7*  8.2*   HCT  29.4*  28.1*   PLT  238  245     CMP:   Recent  Labs   Lab  10/07/18   0444  10/08/18   0500   NA  139  138   K  4.3  4.1   CL  104  100   CO2  27  29   GLU  116*  165*   BUN  17  15   CREATININE  0.9  1.0   CALCIUM  9.6  9.1   ANIONGAP  8  9   EGFRNONAA  >60.0  >60.0     Recent Lab Results       10/08/18  1308 10/08/18  1210 10/08/18  0920 10/08/18  0825 10/08/18  0500      Immature Granulocytes     1.0     Immature Grans (Abs)     0.08  Comment:  Mild elevation in immature granulocytes is non specific and   can be seen in a variety of conditions including stress response,   acute inflammation, trauma and pregnancy. Correlation with other   laboratory and clinical findings is essential.       Anion Gap     9     Baso #     0.03     Basophil%     0.4     BUN, Bld     15     Calcium     9.1     Chloride     100     CO2     29     Creatinine     1.0     Differential Method     Automated     eGFR if      >60.0     eGFR if non      >60.0  Comment:  Calculation used to obtain the estimated glomerular filtration  rate (eGFR) is the CKD-EPI equation.        Eos #     0.1     Eosinophil%     1.6     Glucose     165     Gran # (ANC)     5.1     Gran%     65.4     Hematocrit     28.1     Hemoglobin     8.2     Lymph #     2.0     Lymph%     25.7     Magnesium     1.8     MCH     25.8     MCHC     29.2     MCV     88     Mono #     0.5     Mono%     5.9     MPV     9.9     nRBC     0     Phosphorus     2.8     Platelets     245     POCT Glucose 301 344 193 206      Potassium     4.1     RBC     3.18     RDW     19.0     Sodium     138     WBC     7.74                 10/07/18  2224 10/07/18  1812      Immature Granulocytes       Immature Grans (Abs)       Anion Gap       Baso #       Basophil%       BUN, Bld       Calcium       Chloride       CO2       Creatinine       Differential Method       eGFR if        eGFR if non        Eos #       Eosinophil%       Glucose       Gran # (ANC)       Gran%        Hematocrit       Hemoglobin       Lymph #       Lymph%       Magnesium       MCH       MCHC       MCV       Mono #       Mono%       MPV       nRBC       Phosphorus       Platelets       POCT Glucose 214 284     Potassium       RBC       RDW       Sodium       WBC             Significant Imaging: I have reviewed all pertinent imaging results/findings within the past 24 hours.    Assessment/Plan:      * Closed fracture of right tibia and fibula    Fall  - presents after fall from standing height following d/c home 2 days ago - no LOC or head trauma  - utilizes walker at baseline to assist with ambulation   - splint applied to RLE  - NWB to RLE  - PT/OT  - case management to assist with discharge planning   - PRN pain management   - CT R knee confirms closed non-displaced fx of proximal tibia and fibula  - non-operative management per Ortho, splint in place  - Pt pending SNF as has proven dangerous to remain at home despite her preference due to deconditioning from previous hospital course in addition to new fracture.        COPD (chronic obstructive pulmonary disease)    - denies SOB / wheezing  - in NAD  - resume home medication regimen  - supplemental oxygen as needed to maintain saturations > 92%  - Fluticasone/vilanterol 1 puff daily  - duo nebs PRN SOB / Wheezing   - Encourage CPAP at night especially on opioids for PRN pain, pt is reluctant though. Previous CO2 narcosis on admit with AMS requiring bipap        History of CVA (cerebrovascular accident)    - hx of pontine stroke (2012)  - continue ASA and clopidogrel   - high risk of fall   - PT/OT        Anemia    - Hgb 8.9 on arrival (baseline 9.2-9.5)  - iron supplementation initiated   - trend h/h daily                    Essential hypertension    - Few occasions of SBP ranging between 130-181 since re-admit  - possibly elevated d/t discomfort  - resume home antihypertensive regimen   - monitor and adjust therapy as indicated if persistently elevated         CAD (coronary artery disease)    - denies CP / SOB  - EKG without obvious acute ischemic changes  - continue ASA and statin   - maintain on telemetry   - trend electrolytes and replete as indicated         Insulin dependent diabetes mellitus    24U detemir + 4U aspart with meals on prior discharge  Currently increased to 24U basal + 10U prandial + SSI today  - On 10mg pred od for asthma   - Continue to monitor BGL and titrate insulin as needed          VTE Risk Mitigation (From admission, onward)        Ordered     enoxaparin injection 40 mg  Daily      10/04/18 1022     IP VTE HIGH RISK PATIENT  Once      10/04/18 0338     Place MARGIE hose  Until discontinued      10/04/18 0338     Place sequential compression device  Until discontinued      10/04/18 0338              Wayne Morris MD  Department of Hospital Medicine   [facility]

## 2018-10-08 NOTE — PT/OT/SLP PROGRESS
Occupational Therapy   Treatment    Name: Sheryl Martines  MRN: [id number]  Admitting Diagnosis:  Closed fracture of right tibia and fibula       Recommendations:     Discharge Recommendations: nursing facility, skilled  Discharge Equipment Recommendations:  (tbd)  Barriers to discharge:  Inaccessible home environment    Subjective     Communicated with: RN prior to session.  Pain/Comfort:  · Pain Rating 1: 4/10  · Location - Side 1: Right  · Location - Orientation 1: generalized  · Location 1: leg  · Pain Addressed 1: Reposition  · Pain Rating Post-Intervention 1: 6/10    Patients cultural, spiritual, Islam conflicts given the current situation: none    Objective:     Patient found with: telemetry    General Precautions: Standard, fall   Orthopedic Precautions:RLE non weight bearing   Braces: (long cast on RLE)     Occupational Performance:    Bed Mobility:    · Patient completed Scooting/Bridging with supervision  · Patient completed Supine to Sit with minimum assistance and RLE management   · Patient completed Sit to Supine with maximal assistance     Functional Mobility/Transfers:  · Patient completed Sit <> Stand Transfer with moderate assistance  with  no assistive device   · Patient completed Bed <> Chair Transfer using Stand Pivot technique with maximal assistance with no assistive device  · Functional Mobility: Pt unable to take steps at this time.     Activities of Daily Living:  · Grooming: supervision pt completed oral and facial hygiene seated EOB.   · Upper Body Dressing: minimum assistance donned gown as robe    Patient left HOB elevated with all lines intact, call button in reach and daughter present    AMPA 6 Click:  AMPA Total Score: 18    Treatment & Education:  Pt sat EOB ~15-18 minutes at supv.   Pt completed t/f from EOB to bedside chair at mod a w/o AD.   OT returned at a later time to assist in pt performing t/f from bedside chair to EOB at initially mod a but pt stopped assisting OT w/  t/f causing t/f to become a total assist.  Pt and pt's daughter were educated on POC.   Education:    Assessment:     Sheryl Martines is a 63 y.o. female with a medical diagnosis of Closed fracture of right tibia and fibula.  She presents with impairments listed below. Pt displayed general deconditioning requiring increased assist w/ mobility. Pt did well to perfrom grooming activities seated EOB. Pt progressing towards goals. Pt would benefit from skilled OT services to improve independence and overall occupational functioning.      Performance deficits affecting function are weakness, impaired endurance, impaired self care skills, impaired functional mobilty, gait instability, impaired balance, decreased lower extremity function, decreased ROM, impaired cardiopulmonary response to activity, impaired joint extensibility, orthopedic precautions, decreased safety awareness, pain.      Rehab Prognosis:  good; patient would benefit from acute skilled OT services to address these deficits and reach maximum level of function.       Plan:     Patient to be seen 4 x/week to address the above listed problems via self-care/home management, therapeutic activities, therapeutic exercises  · Plan of Care Expires: 11/03/18  · Plan of Care Reviewed with: patient, daughter    This Plan of care has been discussed with the patient who was involved in its development and understands and is in agreement with the identified goals and treatment plan    GOALS:   Multidisciplinary Problems     Occupational Therapy Goals        Problem: Occupational Therapy Goal    Goal Priority Disciplines Outcome Interventions   Occupational Therapy Goal     OT, PT/OT Ongoing (interventions implemented as appropriate)    Description:  Goals to be met by: 10/18/18     Patient will increase functional independence with ADLs by performing:    UE Dressing with Set-up Assistance and Supervision.  LE Dressing with Minimal Assistance, with modification/AE  prn  Grooming while seated with Supervision. Met 10/8  Toileting from bedside commode with Moderate Assistance for hygiene and clothing management.   Toilet transfer to bedside commode with Minimal Assistance.                       Time Tracking:     OT Date of Treatment: 10/08/18  OT Start Time: 0855  OT Stop Time: 0919  OT Total Time (min): 24 min  OT Time 2: 9914-1426= 5 minutes  OT Total Time= 29 minutes  Billable Minutes:Self Care/Home Management 8 minutes  Therapeutic Activity 21 minutes    Walter Abreu, OT  10/8/2018

## 2018-10-08 NOTE — ASSESSMENT & PLAN NOTE
24U detemir + 4U aspart with meals on prior discharge  Currently increased to 24U basal + 10U prandial + SSI today  - On 10mg pred od for asthma   - Continue to monitor BGL and titrate insulin as needed

## 2018-10-08 NOTE — PT/OT/SLP PROGRESS
Physical Therapy Treatment    Patient Name:  Sheryl Martines   MRN:  [id number]    Recommendations:     Discharge Recommendations:  nursing facility, skilled   Discharge Equipment Recommendations: (TBD)   Barriers to discharge: Inaccessible home and Decreased caregiver support    Assessment:     Sheryl Martines is a 63 y.o. female admitted with a medical diagnosis of Closed fracture of right tibia and fibula.  She presents with the following impairments/functional limitations:  impaired functional mobilty, gait instability, weakness, impaired balance, impaired self care skills, decreased ROM, decreased lower extremity function, pain, orthopedic precautions. Patient requires assistance with all mobility and transfers. Patient able to maintain WB'ing precautions with VC's and min assistance.  Patient demonstrated poor standing balance.  Patient was unable to perform sit to stans with RW.  Patient would benefit from further IP therapy.    Rehab Prognosis:  Fair; patient would benefit from acute skilled PT services to address these deficits and reach maximum level of function.      Recent Surgery: * No surgery found *      Plan:     During this hospitalization, patient to be seen 4 x/week to address the above listed problems via gait training, therapeutic activities, therapeutic exercises  · Plan of Care Expires:  11/03/18   Plan of Care Reviewed with: patient, daughter    Subjective     Communicated with NSG prior to session.  Patient found seated on BS commode upon PT entry to room, agreeable to treatment.      Chief Complaint: R LE pain  Patient comments/goals: I'm scared to move.  Pain/Comfort:  · Pain Rating 1: 10/10  · Location - Side 1: Right  · Location 1: leg    Patients cultural, spiritual, Rastafarian conflicts given the current situation: none    Objective:     Patient found with: telemetry     General Precautions: Standard, fall   Orthopedic Precautions:RLE non weight bearing   Braces: (half cast on R LE)      Functional Mobility:  · Transfers:     · Sit to Stand: From BS commode with no AD total assistance  ·                     Attempted from  BS chair with RW, but Mrs. Martines was unable to elevate hips off chair with total assistance  · Balance: Patient able to maintain standing balance with no AD total assistance and blocking L knee 1 min for pericare         AM-PAC 6 CLICK MOBILITY  Turning over in bed (including adjusting bedclothes, sheets and blankets)?: 2  Sitting down on and standing up from a chair with arms (e.g., wheelchair, bedside commode, etc.): 2  Moving from lying on back to sitting on the side of the bed?: 2  Moving to and from a bed to a chair (including a wheelchair)?: 2  Need to walk in hospital room?: 1  Climbing 3-5 steps with a railing?: 1  Basic Mobility Total Score: 10       Therapeutic Activities and Exercises:   Patient performed GS, QS, hip ABD/ADD ( with assistance), seated hip flexion ( with assistance), SLR ( with assistance while reclined in the chair), LAQ ( limited ROM ) x15-20 reps  Patient educated on POC and benefits of OOB mobility    Patient left up in chair with all lines intact, call button in reach and daughter present..    GOALS:   Multidisciplinary Problems     Physical Therapy Goals        Problem: Physical Therapy Goal    Goal Priority Disciplines Outcome Goal Variances Interventions   Physical Therapy Goal     PT, PT/OT Ongoing (interventions implemented as appropriate)     Description:  Goals to be met by: 10/18/18     Patient will increase functional independence with mobility by performing (all while maintaining NWB RLE):    1. Supine to sit with MInimal Assistance  2. Sit to supine with MInimal Assistance  3. Sit to stand transfer with Maximum Assistance  4. Bed to chair transfer with Maximum Assistance using Rolling Walker  5. Gait  x 5 feet with Moderate Assistance using Rolling Walker.   6. Stand for 1 minutes with Minimal Assistance using Rolling Walker                       Time Tracking:     PT Received On: 10/08/18  PT Start Time: 1000     PT Stop Time: 1024  PT Total Time (min): 24 min     Billable Minutes: Therapeutic Activity 15 and Therapeutic Exercise 9    Treatment Type: Treatment  PT/PTA: PTA     PTA Visit Number: 1     Geo Alexandre II, PTA  10/08/2018

## 2018-10-08 NOTE — SUBJECTIVE & OBJECTIVE
Interval History:  janki RONQUILLO states she feels well other than intermittent leg pain 2/2 fracture. Continuing to adjust insulin regimen to optimize BGLs. PRN tylenol and tramadol for pain, Judicious use opioids 2/2 hx opioid-induced oversedation with CO2 narcosis. Currently awaiting SNF placement.     Past Medical History:   Diagnosis Date    Asthma     Closed compression fracture of fourth lumbar vertebra     COPD (chronic obstructive pulmonary disease)     Coronary artery disease     Diabetes mellitus     Glaucoma     High cholesterol     Hypertension     Iritis     Pulmonary embolus     Stroke     rt sided weakness.       Past Surgical History:   Procedure Laterality Date    ABDOMINAL SURGERY      APPENDECTOMY N/A 8/26/2018    Procedure: APPENDECTOMY;  Surgeon: Bernadine Melendrez MD;  Location: [facility] OR;  Service: General;  Laterality: N/A;    APPENDECTOMY N/A 8/26/2018    Performed by Bernadine Melendrez MD at [facility] OR    APPENDECTOMY, LAPAROSCOPIC---CONVERTED TO OPEN APPENDECTOMY @0950 N/A 8/26/2018    Performed by Bernadine Melendrez MD at [facility] OR    BACK SURGERY      stimulator    CATARACT EXTRACTION      HYSTERECTOMY      LAPAROSCOPIC APPENDECTOMY N/A 8/26/2018    Procedure: APPENDECTOMY, LAPAROSCOPIC---CONVERTED TO OPEN APPENDECTOMY @0950;  Surgeon: Bernadine Melendrez MD;  Location: [facility] OR;  Service: General;  Laterality: N/A;       Review of patient's allergies indicates:   Allergen Reactions    Ace inhibitors Swelling    Corticosteroids (glucocorticoids)     Hydralazine analogues     Tetracyclines Swelling    Travatan (with benzalkonium) [travoprost (benzalkonium)]        No current facility-administered medications on file prior to encounter.      Current Outpatient Medications on File Prior to Encounter   Medication Sig    acetaminophen (TYLENOL) 325 MG tablet Take 2 tablets (650 mg total) by mouth every 6 (six) hours as needed for Pain.    albuterol-ipratropium (DUO-NEB)  2.5 mg-0.5 mg/3 mL nebulizer solution Take 3 mLs by nebulization every 4 (four) hours. Rescue    aspirin (ECOTRIN) 81 MG EC tablet Take 1 tablet (81 mg total) by mouth once daily.    cloNIDine (CATAPRES) 0.3 MG tablet Take 1 tablet (0.3 mg total) by mouth 3 (three) times daily.    clopidogrel (PLAVIX) 75 mg tablet Take 75 mg by mouth once daily.    diltiaZEM (CARDIZEM CD) 240 MG 24 hr capsule Take 1 capsule (240 mg total) by mouth once daily.    DULoxetine (CYMBALTA) 60 MG capsule Take 60 mg by mouth once daily.    fluticasone-vilanterol (BREO) 100-25 mcg/dose diskus inhaler Inhale 1 puff into the lungs once daily. Controller    furosemide (LASIX) 40 MG tablet Take 40 mg by mouth once daily.     hydrALAZINE (APRESOLINE) 25 MG tablet Take 1 tablet (25 mg total) by mouth every 8 (eight) hours.    insulin detemir U-100 (LEVEMIR FLEXTOUCH) 100 unit/mL (3 mL) SubQ InPn pen Inject 24 Units into the skin once daily.    insulin lispro (HUMALOG KWIKPEN) 100 unit/mL InPn pen Inject 3 Units into the skin 3 (three) times daily with meals.    Lactobacillus acidophilus 1 billion cell Cap Take 1 tablet by mouth 2 (two) times daily.    losartan (COZAAR) 100 MG tablet Take 100 mg by mouth once daily.     megestrol (MEGACE) 40 MG Tab Take 40 mg by mouth 2 (two) times daily.    ondansetron (ZOFRAN) 4 MG tablet Take 1 tablet (4 mg total) by mouth every 6 (six) hours as needed.    pantoprazole (PROTONIX) 40 MG tablet Take 40 mg by mouth once daily.    predniSONE (DELTASONE) 10 MG tablet Take 10 mg by mouth once daily.     pyridoxine, vitamin B6, (VITAMIN B-6) 50 MG Tab Take 1 tablet (50 mg total) by mouth once daily.    senna-docusate 8.6-50 mg (SENNA WITH DOCUSATE SODIUM) 8.6-50 mg per tablet Take 1 tablet by mouth 2 (two) times daily.    simethicone 80 mg Tab Take 160 mg by mouth every 6 (six) hours as needed for Flatulence.    simvastatin (ZOCOR) 40 MG tablet Take 0.5 tablets (20 mg total) by mouth every evening.      Family History     Problem Relation (Age of Onset)    Cancer Father    Diabetes Brother    Lupus Sister    Multiple sclerosis Mother        Tobacco Use    Smoking status: Never Smoker   Substance and Sexual Activity    Alcohol use: No     Frequency: Never    Drug use: No    Sexual activity: No     Partners: Male     Review of Systems   Constitutional: Negative for chills and fever.   HENT: Negative for congestion and sore throat.    Eyes: Negative for pain and visual disturbance.   Respiratory: Negative for cough, chest tightness and shortness of breath.    Cardiovascular: Negative for chest pain and palpitations.   Gastrointestinal: Negative for abdominal pain, diarrhea, nausea and vomiting.   Genitourinary: Negative for dysuria and frequency.   Musculoskeletal: Positive for arthralgias, back pain and myalgias.   Skin: Negative for color change and rash.   Neurological: Negative for seizures and syncope.   Hematological: Bruises/bleeds easily.     Objective:     Vital Signs (Most Recent):  Temp: 97.7 °F (36.5 °C) (10/08/18 1211)  Pulse: 65 (10/08/18 1400)  Resp: 16 (10/08/18 1211)  BP: 132/63 (10/08/18 1211)  SpO2: (!) 92 % (10/08/18 1211) Vital Signs (24h Range):  Temp:  [96.7 °F (35.9 °C)-97.9 °F (36.6 °C)] 97.7 °F (36.5 °C)  Pulse:  [60-87] 65  Resp:  [16-20] 16  SpO2:  [92 %-100 %] 92 %  BP: (118-144)/(63-75) 132/63     Weight: 74.3 kg (163 lb 12.8 oz)  Body mass index is 31.99 kg/m².    Physical Exam   Constitutional: She is oriented to person, place, and time. She appears well-developed and well-nourished. No distress.   HENT:   Head: Normocephalic and atraumatic.   Eyes: EOM are normal. Pupils are equal, round, and reactive to light.   Neck: Normal range of motion. Neck supple.   Cardiovascular: Normal rate, regular rhythm, normal heart sounds and intact distal pulses.   Pulmonary/Chest: Effort normal and breath sounds normal. No respiratory distress.   Abdominal: Soft. Bowel sounds are normal. She  exhibits no distension. There is no tenderness.   Musculoskeletal: Normal range of motion. She exhibits edema.   Large bulky splint to RLE - sensory / motor intact    Neurological: She is alert and oriented to person, place, and time.   Skin: Skin is warm and dry. She is not diaphoretic.   Multiple areas of ecchymosis    Psychiatric: She has a normal mood and affect. Her behavior is normal.   Nursing note and vitals reviewed.        CRANIAL NERVES     CN III, IV, VI   Pupils are equal, round, and reactive to light.  Extraocular motions are normal.        Significant Labs:   CBC:   Recent Labs   Lab  10/07/18   0444  10/08/18   0500   WBC  7.38  7.74   HGB  8.7*  8.2*   HCT  29.4*  28.1*   PLT  238  245     CMP:   Recent Labs   Lab  10/07/18   0444  10/08/18   0500   NA  139  138   K  4.3  4.1   CL  104  100   CO2  27  29   GLU  116*  165*   BUN  17  15   CREATININE  0.9  1.0   CALCIUM  9.6  9.1   ANIONGAP  8  9   EGFRNONAA  >60.0  >60.0       Significant Imaging: I have reviewed all pertinent imaging results/findings within the past 24 hours.     Medical record, imaging, EKG, and medications reviewed     Review of Systems   Constitutional: Negative for chills, diaphoresis and fever.   Eyes: Negative for photophobia and visual disturbance.   Respiratory: Negative for cough, shortness of breath and wheezing.    Cardiovascular: Negative for palpitations and leg swelling.   Gastrointestinal: Negative for abdominal pain, constipation, diarrhea, nausea and vomiting.   Genitourinary: Negative for dysuria and flank pain.   Musculoskeletal: Positive for gait problem, joint swelling and myalgias. Negative for arthralgias.   Skin: Negative for rash and wound (open appy scar LLQ healing well).   Neurological: Positive for weakness. Negative for syncope and headaches.   Psychiatric/Behavioral: Negative for agitation, confusion and decreased concentration. The patient is not nervous/anxious.      Objective:     Vital Signs (Most  Recent):  Temp: 97.7 °F (36.5 °C) (10/08/18 1211)  Pulse: 65 (10/08/18 1400)  Resp: 16 (10/08/18 1211)  BP: 132/63 (10/08/18 1211)  SpO2: (!) 92 % (10/08/18 1211) Vital Signs (24h Range):  Temp:  [96.7 °F (35.9 °C)-97.9 °F (36.6 °C)] 97.7 °F (36.5 °C)  Pulse:  [60-87] 65  Resp:  [16-20] 16  SpO2:  [92 %-100 %] 92 %  BP: (118-144)/(63-75) 132/63     Weight: 74.3 kg (163 lb 12.8 oz)  Body mass index is 31.99 kg/m².  No intake or output data in the 24 hours ending 10/08/18 1532   Physical Exam   Constitutional: She is oriented to person, place, and time. She appears well-developed and well-nourished. No distress.   AAOx3 but distractible, poor concentration, somnolent   HENT:   Head: Normocephalic and atraumatic.   Eyes: Conjunctivae are normal. No scleral icterus.   Neck: Neck supple. No JVD present. No tracheal deviation present.   Cardiovascular: Normal rate, regular rhythm, normal heart sounds and intact distal pulses. Exam reveals no gallop and no friction rub.   Pulmonary/Chest: Effort normal and breath sounds normal. No stridor. No respiratory distress. She has no wheezes. She has no rales. She exhibits no tenderness.   Abdominal: Soft. Bowel sounds are normal. She exhibits no mass. There is no tenderness. There is no rebound and no guarding.   Musculoskeletal: She exhibits tenderness. She exhibits no edema or deformity.   R lower leg splint in place, leg elevated on pillow.   Neurological: She is alert and oriented to person, place, and time. No cranial nerve deficit or sensory deficit. Coordination normal.   Skin: Skin is warm and dry. She is not diaphoretic.   Psychiatric: She has a normal mood and affect. Her behavior is normal.   Vitals reviewed.      Significant Labs:   CBC:   Recent Labs   Lab  10/07/18   0444  10/08/18   0500   WBC  7.38  7.74   HGB  8.7*  8.2*   HCT  29.4*  28.1*   PLT  238  245     CMP:   Recent Labs   Lab  10/07/18   0444  10/08/18   0500   NA  139  138   K  4.3  4.1   CL  104  100    CO2  27  29   GLU  116*  165*   BUN  17  15   CREATININE  0.9  1.0   CALCIUM  9.6  9.1   ANIONGAP  8  9   EGFRNONAA  >60.0  >60.0     Recent Lab Results       10/08/18  1308 10/08/18  1210 10/08/18  0920 10/08/18  0825 10/08/18  0500      Immature Granulocytes     1.0     Immature Grans (Abs)     0.08  Comment:  Mild elevation in immature granulocytes is non specific and   can be seen in a variety of conditions including stress response,   acute inflammation, trauma and pregnancy. Correlation with other   laboratory and clinical findings is essential.       Anion Gap     9     Baso #     0.03     Basophil%     0.4     BUN, Bld     15     Calcium     9.1     Chloride     100     CO2     29     Creatinine     1.0     Differential Method     Automated     eGFR if      >60.0     eGFR if non      >60.0  Comment:  Calculation used to obtain the estimated glomerular filtration  rate (eGFR) is the CKD-EPI equation.        Eos #     0.1     Eosinophil%     1.6     Glucose     165     Gran # (ANC)     5.1     Gran%     65.4     Hematocrit     28.1     Hemoglobin     8.2     Lymph #     2.0     Lymph%     25.7     Magnesium     1.8     MCH     25.8     MCHC     29.2     MCV     88     Mono #     0.5     Mono%     5.9     MPV     9.9     nRBC     0     Phosphorus     2.8     Platelets     245     POCT Glucose 301 344 193 206      Potassium     4.1     RBC     3.18     RDW     19.0     Sodium     138     WBC     7.74                 10/07/18  2224 10/07/18  1812      Immature Granulocytes       Immature Grans (Abs)       Anion Gap       Baso #       Basophil%       BUN, Bld       Calcium       Chloride       CO2       Creatinine       Differential Method       eGFR if        eGFR if non        Eos #       Eosinophil%       Glucose       Gran # (ANC)       Gran%       Hematocrit       Hemoglobin       Lymph #       Lymph%       Magnesium       MCH       MCHC        MCV       Mono #       Mono%       MPV       nRBC       Phosphorus       Platelets       POCT Glucose 214 284     Potassium       RBC       RDW       Sodium       WBC             Significant Imaging: I have reviewed all pertinent imaging results/findings within the past 24 hours.

## 2018-10-08 NOTE — PLAN OF CARE
Problem: Diabetes, Type 2 (Adult)  Goal: Signs and Symptoms of Listed Potential Problems Will be Absent, Minimized or Managed (Diabetes, Type 2)  Signs and symptoms of listed potential problems will be absent, minimized or managed by discharge/transition of care (reference Diabetes, Type 2 (Adult) CPG).  Outcome: Ongoing (interventions implemented as appropriate)   10/08/18 0348   Diabetes, Type 2   Problems Assessed (Type 2 Diabetes) all   Problems Present (Type 2 Diabetes) none

## 2018-10-08 NOTE — PLAN OF CARE
Problem: Pressure Ulcer Risk (Finn Scale) (Adult,Obstetrics,Pediatric)  Goal: Skin Integrity  Patient will demonstrate the desired outcomes by discharge/transition of care.  Outcome: Ongoing (interventions implemented as appropriate)   10/08/18 8032   Pressure Ulcer Risk (Finn Scale) (Adult,Obstetrics,Pediatric)   Skin Integrity making progress toward outcome

## 2018-10-08 NOTE — ASSESSMENT & PLAN NOTE
- denies SOB / wheezing  - in NAD  - resume home medication regimen  - supplemental oxygen as needed to maintain saturations > 92%  - Fluticasone/vilanterol 1 puff daily  - duo nebs PRN SOB / Wheezing   - Encourage CPAP at night especially on opioids for PRN pain, pt is reluctant though. Previous CO2 narcosis on admit with AMS requiring bipap

## 2018-10-08 NOTE — PLAN OF CARE
Problem: Physical Therapy Goal  Goal: Physical Therapy Goal  Goals to be met by: 10/18/18     Patient will increase functional independence with mobility by performing (all while maintaining NWB RLE):    1. Supine to sit with MInimal Assistance  2. Sit to supine with MInimal Assistance  3. Sit to stand transfer with Maximum Assistance  4. Bed to chair transfer with Maximum Assistance using Rolling Walker  5. Gait  x 5 feet with Moderate Assistance using Rolling Walker.   6. Stand for 1 minutes with Minimal Assistance using Rolling Walker     Patient goals remain appropriate.  Patient will continue to benefit from further PT services.  Geo Alexandre, PTA

## 2018-10-09 LAB
ANION GAP SERPL CALC-SCNC: 7 MMOL/L
BASOPHILS # BLD AUTO: 0.04 K/UL
BASOPHILS NFR BLD: 0.5 %
BUN SERPL-MCNC: 20 MG/DL
CALCIUM SERPL-MCNC: 8.7 MG/DL
CHLORIDE SERPL-SCNC: 101 MMOL/L
CO2 SERPL-SCNC: 27 MMOL/L
CREAT SERPL-MCNC: 1 MG/DL
DIFFERENTIAL METHOD: ABNORMAL
EOSINOPHIL # BLD AUTO: 0.1 K/UL
EOSINOPHIL NFR BLD: 0.9 %
ERYTHROCYTE [DISTWIDTH] IN BLOOD BY AUTOMATED COUNT: 19 %
EST. GFR  (AFRICAN AMERICAN): >60 ML/MIN/1.73 M^2
EST. GFR  (NON AFRICAN AMERICAN): >60 ML/MIN/1.73 M^2
GLUCOSE SERPL-MCNC: 127 MG/DL
HCT VFR BLD AUTO: 25.6 %
HGB BLD-MCNC: 8 G/DL
IMM GRANULOCYTES # BLD AUTO: 0.08 K/UL
IMM GRANULOCYTES NFR BLD AUTO: 0.9 %
LYMPHOCYTES # BLD AUTO: 2.6 K/UL
LYMPHOCYTES NFR BLD: 30.4 %
MCH RBC QN AUTO: 26.9 PG
MCHC RBC AUTO-ENTMCNC: 31.3 G/DL
MCV RBC AUTO: 86 FL
MONOCYTES # BLD AUTO: 0.5 K/UL
MONOCYTES NFR BLD: 6.3 %
NEUTROPHILS # BLD AUTO: 5.3 K/UL
NEUTROPHILS NFR BLD: 61 %
NRBC BLD-RTO: 0 /100 WBC
PLATELET # BLD AUTO: 257 K/UL
PMV BLD AUTO: 10.1 FL
POCT GLUCOSE: 151 MG/DL (ref 70–110)
POCT GLUCOSE: 185 MG/DL (ref 70–110)
POCT GLUCOSE: 242 MG/DL (ref 70–110)
POCT GLUCOSE: 271 MG/DL (ref 70–110)
POTASSIUM SERPL-SCNC: 3.9 MMOL/L
RBC # BLD AUTO: 2.97 M/UL
SODIUM SERPL-SCNC: 135 MMOL/L
WBC # BLD AUTO: 8.61 K/UL

## 2018-10-09 PROCEDURE — 99225 PR SUBSEQUENT OBSERVATION CARE,LEVEL II: CPT | Mod: GC,,, | Performed by: HOSPITALIST

## 2018-10-09 PROCEDURE — 85025 COMPLETE CBC W/AUTO DIFF WBC: CPT

## 2018-10-09 PROCEDURE — 63600175 PHARM REV CODE 636 W HCPCS: Performed by: NURSE PRACTITIONER

## 2018-10-09 PROCEDURE — 80048 BASIC METABOLIC PNL TOTAL CA: CPT

## 2018-10-09 PROCEDURE — 25000003 PHARM REV CODE 250: Performed by: STUDENT IN AN ORGANIZED HEALTH CARE EDUCATION/TRAINING PROGRAM

## 2018-10-09 PROCEDURE — G0378 HOSPITAL OBSERVATION PER HR: HCPCS

## 2018-10-09 PROCEDURE — 63600175 PHARM REV CODE 636 W HCPCS: Performed by: STUDENT IN AN ORGANIZED HEALTH CARE EDUCATION/TRAINING PROGRAM

## 2018-10-09 PROCEDURE — 36415 COLL VENOUS BLD VENIPUNCTURE: CPT

## 2018-10-09 PROCEDURE — 25000003 PHARM REV CODE 250: Performed by: NURSE PRACTITIONER

## 2018-10-09 RX ORDER — INSULIN ASPART 100 [IU]/ML
12 INJECTION, SOLUTION INTRAVENOUS; SUBCUTANEOUS
Status: DISCONTINUED | OUTPATIENT
Start: 2018-10-09 | End: 2018-10-12 | Stop reason: HOSPADM

## 2018-10-09 RX ADMIN — CLONIDINE HYDROCHLORIDE 0.3 MG: 0.2 TABLET ORAL at 09:10

## 2018-10-09 RX ADMIN — ACETAMINOPHEN 650 MG: 325 TABLET, FILM COATED ORAL at 04:10

## 2018-10-09 RX ADMIN — INSULIN ASPART 12 UNITS: 100 INJECTION, SOLUTION INTRAVENOUS; SUBCUTANEOUS at 07:10

## 2018-10-09 RX ADMIN — INSULIN ASPART 2 UNITS: 100 INJECTION, SOLUTION INTRAVENOUS; SUBCUTANEOUS at 01:10

## 2018-10-09 RX ADMIN — TRAMADOL HYDROCHLORIDE 50 MG: 50 TABLET, FILM COATED ORAL at 02:10

## 2018-10-09 RX ADMIN — FLUTICASONE FUROATE AND VILANTEROL TRIFENATATE 1 PUFF: 100; 25 POWDER RESPIRATORY (INHALATION) at 09:10

## 2018-10-09 RX ADMIN — INSULIN DETEMIR 28 UNITS: 100 INJECTION, SOLUTION SUBCUTANEOUS at 10:10

## 2018-10-09 RX ADMIN — TRAMADOL HYDROCHLORIDE 50 MG: 50 TABLET, FILM COATED ORAL at 09:10

## 2018-10-09 RX ADMIN — HYDRALAZINE HYDROCHLORIDE 25 MG: 25 TABLET ORAL at 09:10

## 2018-10-09 RX ADMIN — PANTOPRAZOLE SODIUM 40 MG: 40 TABLET, DELAYED RELEASE ORAL at 09:10

## 2018-10-09 RX ADMIN — FUROSEMIDE 40 MG: 40 TABLET ORAL at 09:10

## 2018-10-09 RX ADMIN — MEGESTROL ACETATE 40 MG: 40 TABLET ORAL at 09:10

## 2018-10-09 RX ADMIN — ACETAMINOPHEN 650 MG: 325 TABLET, FILM COATED ORAL at 09:10

## 2018-10-09 RX ADMIN — PREDNISONE 10 MG: 10 TABLET ORAL at 09:10

## 2018-10-09 RX ADMIN — DULOXETINE HYDROCHLORIDE 60 MG: 30 CAPSULE, DELAYED RELEASE ORAL at 09:10

## 2018-10-09 RX ADMIN — SENNOSIDES AND DOCUSATE SODIUM 1 TABLET: 8.6; 5 TABLET ORAL at 09:10

## 2018-10-09 RX ADMIN — DILTIAZEM HYDROCHLORIDE 240 MG: 240 CAPSULE, COATED, EXTENDED RELEASE ORAL at 09:10

## 2018-10-09 RX ADMIN — HYDRALAZINE HYDROCHLORIDE 25 MG: 25 TABLET ORAL at 05:10

## 2018-10-09 RX ADMIN — LOSARTAN POTASSIUM 100 MG: 50 TABLET ORAL at 09:10

## 2018-10-09 RX ADMIN — CLOPIDOGREL 75 MG: 75 TABLET, FILM COATED ORAL at 09:10

## 2018-10-09 RX ADMIN — CLONIDINE HYDROCHLORIDE 0.3 MG: 0.2 TABLET ORAL at 04:10

## 2018-10-09 RX ADMIN — SIMVASTATIN 20 MG: 20 TABLET, FILM COATED ORAL at 09:10

## 2018-10-09 RX ADMIN — ACETAMINOPHEN 650 MG: 325 TABLET, FILM COATED ORAL at 05:10

## 2018-10-09 RX ADMIN — ENOXAPARIN SODIUM 40 MG: 100 INJECTION SUBCUTANEOUS at 05:10

## 2018-10-09 RX ADMIN — HYDRALAZINE HYDROCHLORIDE 25 MG: 25 TABLET ORAL at 02:10

## 2018-10-09 RX ADMIN — INSULIN ASPART 12 UNITS: 100 INJECTION, SOLUTION INTRAVENOUS; SUBCUTANEOUS at 01:10

## 2018-10-09 RX ADMIN — FERROUS SULFATE TAB EC 325 MG (65 MG FE EQUIVALENT) 325 MG: 325 (65 FE) TABLET DELAYED RESPONSE at 09:10

## 2018-10-09 RX ADMIN — ASPIRIN 81 MG: 81 TABLET, COATED ORAL at 09:10

## 2018-10-09 NOTE — PLAN OF CARE
Problem: Patient Care Overview  Goal: Plan of Care Review  Outcome: Ongoing (interventions implemented as appropriate)  POC reviewed with patient.  No acute events overnight.  Pain well managed with ordered analgesics and repositioning.  RLE elevated. Turned q2hr.  VSS.  Safety maintained.

## 2018-10-09 NOTE — ASSESSMENT & PLAN NOTE
24U detemir + 4U aspart with meals on prior discharge  Currently increased to 28U basal + 12U prandial + SSI today  - On 10mg pred od for asthma   - Continue to monitor BGL and titrate insulin as needed

## 2018-10-09 NOTE — SUBJECTIVE & OBJECTIVE
Interval History:  janki RONQUILLO states she feels well other than intermittent leg pain 2/2 fracture. Continuing to adjust insulin regimen to optimize BGLs. PRN tylenol and tramadol for pain, Judicious use opioids 2/2 hx opioid-induced oversedation with CO2 narcosis. Currently awaiting SNF placement.     Past Medical History:   Diagnosis Date    Asthma     Closed compression fracture of fourth lumbar vertebra     COPD (chronic obstructive pulmonary disease)     Coronary artery disease     Diabetes mellitus     Glaucoma     High cholesterol     Hypertension     Iritis     Pulmonary embolus     Stroke     rt sided weakness.       Past Surgical History:   Procedure Laterality Date    ABDOMINAL SURGERY      APPENDECTOMY N/A 8/26/2018    Procedure: APPENDECTOMY;  Surgeon: Bernadine Melendrez MD;  Location: [facility] OR;  Service: General;  Laterality: N/A;    APPENDECTOMY N/A 8/26/2018    Performed by Bernadine Melendrez MD at [facility] OR    APPENDECTOMY, LAPAROSCOPIC---CONVERTED TO OPEN APPENDECTOMY @0950 N/A 8/26/2018    Performed by Bernadine Melendrez MD at [facility] OR    BACK SURGERY      stimulator    CATARACT EXTRACTION      HYSTERECTOMY      LAPAROSCOPIC APPENDECTOMY N/A 8/26/2018    Procedure: APPENDECTOMY, LAPAROSCOPIC---CONVERTED TO OPEN APPENDECTOMY @0950;  Surgeon: Bernadine Melendrez MD;  Location: [facility] OR;  Service: General;  Laterality: N/A;       Review of patient's allergies indicates:   Allergen Reactions    Ace inhibitors Swelling    Corticosteroids (glucocorticoids)     Hydralazine analogues     Tetracyclines Swelling    Travatan (with benzalkonium) [travoprost (benzalkonium)]        No current facility-administered medications on file prior to encounter.      Current Outpatient Medications on File Prior to Encounter   Medication Sig    acetaminophen (TYLENOL) 325 MG tablet Take 2 tablets (650 mg total) by mouth every 6 (six) hours as needed for Pain.    albuterol-ipratropium (DUO-NEB)  2.5 mg-0.5 mg/3 mL nebulizer solution Take 3 mLs by nebulization every 4 (four) hours. Rescue    aspirin (ECOTRIN) 81 MG EC tablet Take 1 tablet (81 mg total) by mouth once daily.    cloNIDine (CATAPRES) 0.3 MG tablet Take 1 tablet (0.3 mg total) by mouth 3 (three) times daily.    clopidogrel (PLAVIX) 75 mg tablet Take 75 mg by mouth once daily.    diltiaZEM (CARDIZEM CD) 240 MG 24 hr capsule Take 1 capsule (240 mg total) by mouth once daily.    DULoxetine (CYMBALTA) 60 MG capsule Take 60 mg by mouth once daily.    fluticasone-vilanterol (BREO) 100-25 mcg/dose diskus inhaler Inhale 1 puff into the lungs once daily. Controller    furosemide (LASIX) 40 MG tablet Take 40 mg by mouth once daily.     hydrALAZINE (APRESOLINE) 25 MG tablet Take 1 tablet (25 mg total) by mouth every 8 (eight) hours.    insulin detemir U-100 (LEVEMIR FLEXTOUCH) 100 unit/mL (3 mL) SubQ InPn pen Inject 24 Units into the skin once daily.    insulin lispro (HUMALOG KWIKPEN) 100 unit/mL InPn pen Inject 3 Units into the skin 3 (three) times daily with meals.    Lactobacillus acidophilus 1 billion cell Cap Take 1 tablet by mouth 2 (two) times daily.    losartan (COZAAR) 100 MG tablet Take 100 mg by mouth once daily.     megestrol (MEGACE) 40 MG Tab Take 40 mg by mouth 2 (two) times daily.    ondansetron (ZOFRAN) 4 MG tablet Take 1 tablet (4 mg total) by mouth every 6 (six) hours as needed.    pantoprazole (PROTONIX) 40 MG tablet Take 40 mg by mouth once daily.    predniSONE (DELTASONE) 10 MG tablet Take 10 mg by mouth once daily.     pyridoxine, vitamin B6, (VITAMIN B-6) 50 MG Tab Take 1 tablet (50 mg total) by mouth once daily.    senna-docusate 8.6-50 mg (SENNA WITH DOCUSATE SODIUM) 8.6-50 mg per tablet Take 1 tablet by mouth 2 (two) times daily.    simethicone 80 mg Tab Take 160 mg by mouth every 6 (six) hours as needed for Flatulence.    simvastatin (ZOCOR) 40 MG tablet Take 0.5 tablets (20 mg total) by mouth every evening.      Family History     Problem Relation (Age of Onset)    Cancer Father    Diabetes Brother    Lupus Sister    Multiple sclerosis Mother        Tobacco Use    Smoking status: Never Smoker   Substance and Sexual Activity    Alcohol use: No     Frequency: Never    Drug use: No    Sexual activity: No     Partners: Male     Review of Systems   Constitutional: Negative for chills and fever.   HENT: Negative for congestion and sore throat.    Eyes: Negative for pain and visual disturbance.   Respiratory: Negative for cough, chest tightness and shortness of breath.    Cardiovascular: Negative for chest pain and palpitations.   Gastrointestinal: Negative for abdominal pain, diarrhea, nausea and vomiting.   Genitourinary: Negative for dysuria and frequency.   Musculoskeletal: Positive for arthralgias, back pain and myalgias.   Skin: Negative for color change and rash.   Neurological: Negative for seizures and syncope.   Hematological: Bruises/bleeds easily.     Objective:     Vital Signs (Most Recent):  Temp: 98.7 °F (37.1 °C) (10/09/18 0828)  Pulse: 65 (10/09/18 0905)  Resp: 20 (10/09/18 0905)  BP: (!) 115/59 (10/09/18 0828)  SpO2: 97 % (10/09/18 0828) Vital Signs (24h Range):  Temp:  [60.8 °F (16 °C)-98.7 °F (37.1 °C)] 98.7 °F (37.1 °C)  Pulse:  [56-80] 65  Resp:  [16-20] 20  SpO2:  [92 %-98 %] 97 %  BP: (115-137)/(56-67) 115/59     Weight: 74.9 kg (165 lb 2 oz)  Body mass index is 32.25 kg/m².    Physical Exam   Constitutional: She is oriented to person, place, and time. She appears well-developed and well-nourished. No distress.   HENT:   Head: Normocephalic and atraumatic.   Eyes: EOM are normal. Pupils are equal, round, and reactive to light.   Neck: Normal range of motion. Neck supple.   Cardiovascular: Normal rate, regular rhythm, normal heart sounds and intact distal pulses.   Pulmonary/Chest: Effort normal and breath sounds normal. No respiratory distress.   Abdominal: Soft. Bowel sounds are normal. She  exhibits no distension. There is no tenderness.   Musculoskeletal: Normal range of motion. She exhibits edema.   Large bulky splint to RLE - sensory / motor intact    Neurological: She is alert and oriented to person, place, and time.   Skin: Skin is warm and dry. She is not diaphoretic.   Multiple areas of ecchymosis    Psychiatric: She has a normal mood and affect. Her behavior is normal.   Nursing note and vitals reviewed.        CRANIAL NERVES     CN III, IV, VI   Pupils are equal, round, and reactive to light.  Extraocular motions are normal.        Significant Labs:   CBC:   Recent Labs   Lab  10/08/18   0500  10/09/18   0540   WBC  7.74  8.61   HGB  8.2*  8.0*   HCT  28.1*  25.6*   PLT  245  257     CMP:   Recent Labs   Lab  10/08/18   0500  10/09/18   0539   NA  138  135*   K  4.1  3.9   CL  100  101   CO2  29  27   GLU  165*  127*   BUN  15  20   CREATININE  1.0  1.0   CALCIUM  9.1  8.7   ANIONGAP  9  7*   EGFRNONAA  >60.0  >60.0       Significant Imaging: I have reviewed all pertinent imaging results/findings within the past 24 hours.     Medical record, imaging, EKG, and medications reviewed     Review of Systems   Constitutional: Negative for chills, diaphoresis and fever.   Eyes: Negative for photophobia and visual disturbance.   Respiratory: Negative for cough, shortness of breath and wheezing.    Cardiovascular: Negative for palpitations and leg swelling.   Gastrointestinal: Negative for abdominal pain, constipation, diarrhea, nausea and vomiting.   Genitourinary: Negative for dysuria and flank pain.   Musculoskeletal: Positive for gait problem, joint swelling and myalgias. Negative for arthralgias.   Skin: Negative for rash and wound (open appy scar LLQ healing well).   Neurological: Positive for weakness. Negative for syncope and headaches.   Psychiatric/Behavioral: Negative for agitation, confusion and decreased concentration. The patient is not nervous/anxious.      Objective:     Vital Signs  (Most Recent):  Temp: 98.7 °F (37.1 °C) (10/09/18 0828)  Pulse: 65 (10/09/18 0905)  Resp: 20 (10/09/18 0905)  BP: (!) 115/59 (10/09/18 0828)  SpO2: 97 % (10/09/18 0828) Vital Signs (24h Range):  Temp:  [60.8 °F (16 °C)-98.7 °F (37.1 °C)] 98.7 °F (37.1 °C)  Pulse:  [56-80] 65  Resp:  [16-20] 20  SpO2:  [92 %-98 %] 97 %  BP: (115-137)/(56-67) 115/59     Weight: 74.9 kg (165 lb 2 oz)  Body mass index is 32.25 kg/m².    Intake/Output Summary (Last 24 hours) at 10/9/2018 0945  Last data filed at 10/9/2018 0500  Gross per 24 hour   Intake 855 ml   Output --   Net 855 ml      Physical Exam   Constitutional: She is oriented to person, place, and time. She appears well-developed and well-nourished. No distress.   AAOx3 but distractible, poor concentration, somnolent   HENT:   Head: Normocephalic and atraumatic.   Eyes: Conjunctivae are normal. No scleral icterus.   Neck: Neck supple. No JVD present. No tracheal deviation present.   Cardiovascular: Normal rate, regular rhythm, normal heart sounds and intact distal pulses. Exam reveals no gallop and no friction rub.   Pulmonary/Chest: Effort normal and breath sounds normal. No stridor. No respiratory distress. She has no wheezes. She has no rales. She exhibits no tenderness.   Abdominal: Soft. Bowel sounds are normal. She exhibits no mass. There is no tenderness. There is no rebound and no guarding.   Musculoskeletal: She exhibits tenderness. She exhibits no edema or deformity.   R lower leg splint in place, leg elevated on pillow.   Neurological: She is alert and oriented to person, place, and time. No cranial nerve deficit or sensory deficit. Coordination normal.   Skin: Skin is warm and dry. She is not diaphoretic.   Psychiatric: She has a normal mood and affect. Her behavior is normal.   Vitals reviewed.      Significant Labs:   CBC:   Recent Labs   Lab  10/08/18   0500  10/09/18   0540   WBC  7.74  8.61   HGB  8.2*  8.0*   HCT  28.1*  25.6*   PLT  245  257     CMP:    Recent Labs   Lab  10/08/18   0500  10/09/18   0539   NA  138  135*   K  4.1  3.9   CL  100  101   CO2  29  27   GLU  165*  127*   BUN  15  20   CREATININE  1.0  1.0   CALCIUM  9.1  8.7   ANIONGAP  9  7*   EGFRNONAA  >60.0  >60.0     Recent Lab Results       10/09/18  0852 10/09/18  0540 10/09/18  0539 10/08/18  2135 10/08/18  1852      Immature Granulocytes  0.9        Immature Grans (Abs)  0.08  Comment:  Mild elevation in immature granulocytes is non specific and   can be seen in a variety of conditions including stress response,   acute inflammation, trauma and pregnancy. Correlation with other   laboratory and clinical findings is essential.          Anion Gap   7       Baso #  0.04        Basophil%  0.5        BUN, Bld   20       Calcium   8.7       Chloride   101       CO2   27       Creatinine   1.0       Differential Method  Automated        eGFR if    >60.0       eGFR if non    >60.0  Comment:  Calculation used to obtain the estimated glomerular filtration  rate (eGFR) is the CKD-EPI equation.          Eos #  0.1        Eosinophil%  0.9        Glucose   127       Gran # (ANC)  5.3        Gran%  61.0        Hematocrit  25.6        Hemoglobin  8.0        Lymph #  2.6        Lymph%  30.4        MCH  26.9        MCHC  31.3        MCV  86        Mono #  0.5        Mono%  6.3        MPV  10.1        nRBC  0        Platelets  257        POCT Glucose 151   294 244     Potassium   3.9       RBC  2.97        RDW  19.0        Sodium   135       WBC  8.61                    10/08/18  1308 10/08/18  1210      Immature Granulocytes       Immature Grans (Abs)       Anion Gap       Baso #       Basophil%       BUN, Bld       Calcium       Chloride       CO2       Creatinine       Differential Method       eGFR if        eGFR if non        Eos #       Eosinophil%       Glucose       Gran # (ANC)       Gran%       Hematocrit       Hemoglobin       Lymph #        Lymph%       MCH       MCHC       MCV       Mono #       Mono%       MPV       nRBC       Platelets       POCT Glucose 301 344     Potassium       RBC       RDW       Sodium       WBC             Significant Imaging: I have reviewed all pertinent imaging results/findings within the past 24 hours.

## 2018-10-09 NOTE — ASSESSMENT & PLAN NOTE
- most recent albumin 2.5 (10/01)  - prealbumin 20 on admit (borderline low)  - previous nutrition rec's 09/21/18 encourage po intake, 2000 diabetic / cardiac, Boost breeze TID

## 2018-10-09 NOTE — ASSESSMENT & PLAN NOTE
- Hgb 8.9 on arrival (last known baseline 9.2)  - iron supplementation initiated   - trend h/h daily

## 2018-10-09 NOTE — PROGRESS NOTES
[facility] Hospital Medicine  Progress Note    Patient Name: Sheryl Martines  MRN: [id number]  Patient Class: OP- Observation   Admission Date: 10/3/2018  Length of Stay: 0 days  Attending Physician: Servando García, *  Primary Care Provider: Primary Doctor [facility] Medicine Team: [facility]OSP MED 1 Wayne Morris MD    Subjective:     Principal Problem:Closed fracture of right tibia and fibula    HPI:  62 y/o female, who presents to the ED with R knee pain after fall from standing height.  She has a PMH of CVA, COPD, CAD, HTN, DM, HLD, and asthma.  She states that she was walking to her front step when her knees buckled, and she fell landing on her knees.  She reports using a walker at baseline to assist with ambulation.  She denies LOC or head trauma.  She was in substantial pain while in ED and received 5mg oxycodone IR, 15mg morphine PO, and 6mg morphine IM.  Imaging reveals nondisplaced right proximal tibial / fibular fractures.  She was evaluated by orthopedics while in ED and a splint was applied.     Recently admitted from SNF on 09/23/18 for AMS r/t possible Co2 narcosis, and was discharged home with home health on 10/2/2018.     Hospital Course:  Pt D/C home 2 days ago after prolonged stay in SNF and hospital for open appy complicated by infection and CO2 narcosis associated with AMS. Pt was discharged mentating and baseline and without signs of infection. It was recommended she d/c to SNF for rehab and additional care due to her deconditioning but pt was adamant about wanting to return home. Pt knees gave out while ambulating outside her home and collapsed to knees and then falling on her side, resulting in closed, non-displaced proximal fracture of the R tibia and fibula    Knee xray and CT knee demonstrate non-displaced fractures of the proximal tibia and fibula. For non-operative management per ortho. Leg splinted in ED. Other than her new fracture her medical condition is as  per recent hospital d/c without new issues or deterioration. Awaiting SNF placement    Interval History:  janki RONQUILLO states she feels well other than intermittent leg pain 2/2 fracture. Continuing to adjust insulin regimen to optimize BGLs. PRN tylenol and tramadol for pain, Judicious use opioids 2/2 hx opioid-induced oversedation with CO2 narcosis. Currently awaiting SNF placement.     Past Medical History:   Diagnosis Date    Asthma     Closed compression fracture of fourth lumbar vertebra     COPD (chronic obstructive pulmonary disease)     Coronary artery disease     Diabetes mellitus     Glaucoma     High cholesterol     Hypertension     Iritis     Pulmonary embolus     Stroke     rt sided weakness.       Past Surgical History:   Procedure Laterality Date    ABDOMINAL SURGERY      APPENDECTOMY N/A 8/26/2018    Procedure: APPENDECTOMY;  Surgeon: Bernadine Melendrez MD;  Location: [facility] OR;  Service: General;  Laterality: N/A;    APPENDECTOMY N/A 8/26/2018    Performed by Bernadine Melendrez MD at [facility] OR    APPENDECTOMY, LAPAROSCOPIC---CONVERTED TO OPEN APPENDECTOMY @0950 N/A 8/26/2018    Performed by Bernadine Melendrez MD at [facility] OR    BACK SURGERY      stimulator    CATARACT EXTRACTION      HYSTERECTOMY      LAPAROSCOPIC APPENDECTOMY N/A 8/26/2018    Procedure: APPENDECTOMY, LAPAROSCOPIC---CONVERTED TO OPEN APPENDECTOMY @0950;  Surgeon: Bernadine Melendrez MD;  Location: [facility] OR;  Service: General;  Laterality: N/A;       Review of patient's allergies indicates:   Allergen Reactions    Ace inhibitors Swelling    Corticosteroids (glucocorticoids)     Hydralazine analogues     Tetracyclines Swelling    Travatan (with benzalkonium) [travoprost (benzalkonium)]        No current facility-administered medications on file prior to encounter.      Current Outpatient Medications on File Prior to Encounter   Medication Sig    acetaminophen (TYLENOL) 325 MG tablet Take 2 tablets (650 mg total)  by mouth every 6 (six) hours as needed for Pain.    albuterol-ipratropium (DUO-NEB) 2.5 mg-0.5 mg/3 mL nebulizer solution Take 3 mLs by nebulization every 4 (four) hours. Rescue    aspirin (ECOTRIN) 81 MG EC tablet Take 1 tablet (81 mg total) by mouth once daily.    cloNIDine (CATAPRES) 0.3 MG tablet Take 1 tablet (0.3 mg total) by mouth 3 (three) times daily.    clopidogrel (PLAVIX) 75 mg tablet Take 75 mg by mouth once daily.    diltiaZEM (CARDIZEM CD) 240 MG 24 hr capsule Take 1 capsule (240 mg total) by mouth once daily.    DULoxetine (CYMBALTA) 60 MG capsule Take 60 mg by mouth once daily.    fluticasone-vilanterol (BREO) 100-25 mcg/dose diskus inhaler Inhale 1 puff into the lungs once daily. Controller    furosemide (LASIX) 40 MG tablet Take 40 mg by mouth once daily.     hydrALAZINE (APRESOLINE) 25 MG tablet Take 1 tablet (25 mg total) by mouth every 8 (eight) hours.    insulin detemir U-100 (LEVEMIR FLEXTOUCH) 100 unit/mL (3 mL) SubQ InPn pen Inject 24 Units into the skin once daily.    insulin lispro (HUMALOG KWIKPEN) 100 unit/mL InPn pen Inject 3 Units into the skin 3 (three) times daily with meals.    Lactobacillus acidophilus 1 billion cell Cap Take 1 tablet by mouth 2 (two) times daily.    losartan (COZAAR) 100 MG tablet Take 100 mg by mouth once daily.     megestrol (MEGACE) 40 MG Tab Take 40 mg by mouth 2 (two) times daily.    ondansetron (ZOFRAN) 4 MG tablet Take 1 tablet (4 mg total) by mouth every 6 (six) hours as needed.    pantoprazole (PROTONIX) 40 MG tablet Take 40 mg by mouth once daily.    predniSONE (DELTASONE) 10 MG tablet Take 10 mg by mouth once daily.     pyridoxine, vitamin B6, (VITAMIN B-6) 50 MG Tab Take 1 tablet (50 mg total) by mouth once daily.    senna-docusate 8.6-50 mg (SENNA WITH DOCUSATE SODIUM) 8.6-50 mg per tablet Take 1 tablet by mouth 2 (two) times daily.    simethicone 80 mg Tab Take 160 mg by mouth every 6 (six) hours as needed for Flatulence.     simvastatin (ZOCOR) 40 MG tablet Take 0.5 tablets (20 mg total) by mouth every evening.     Family History     Problem Relation (Age of Onset)    Cancer Father    Diabetes Brother    Lupus Sister    Multiple sclerosis Mother        Tobacco Use    Smoking status: Never Smoker   Substance and Sexual Activity    Alcohol use: No     Frequency: Never    Drug use: No    Sexual activity: No     Partners: Male     Review of Systems   Constitutional: Negative for chills and fever.   HENT: Negative for congestion and sore throat.    Eyes: Negative for pain and visual disturbance.   Respiratory: Negative for cough, chest tightness and shortness of breath.    Cardiovascular: Negative for chest pain and palpitations.   Gastrointestinal: Negative for abdominal pain, diarrhea, nausea and vomiting.   Genitourinary: Negative for dysuria and frequency.   Musculoskeletal: Positive for arthralgias, back pain and myalgias.   Skin: Negative for color change and rash.   Neurological: Negative for seizures and syncope.   Hematological: Bruises/bleeds easily.     Objective:     Vital Signs (Most Recent):  Temp: 98.7 °F (37.1 °C) (10/09/18 0828)  Pulse: 65 (10/09/18 0905)  Resp: 20 (10/09/18 0905)  BP: (!) 115/59 (10/09/18 0828)  SpO2: 97 % (10/09/18 0828) Vital Signs (24h Range):  Temp:  [60.8 °F (16 °C)-98.7 °F (37.1 °C)] 98.7 °F (37.1 °C)  Pulse:  [56-80] 65  Resp:  [16-20] 20  SpO2:  [92 %-98 %] 97 %  BP: (115-137)/(56-67) 115/59     Weight: 74.9 kg (165 lb 2 oz)  Body mass index is 32.25 kg/m².    Physical Exam   Constitutional: She is oriented to person, place, and time. She appears well-developed and well-nourished. No distress.   HENT:   Head: Normocephalic and atraumatic.   Eyes: EOM are normal. Pupils are equal, round, and reactive to light.   Neck: Normal range of motion. Neck supple.   Cardiovascular: Normal rate, regular rhythm, normal heart sounds and intact distal pulses.   Pulmonary/Chest: Effort normal and breath sounds  normal. No respiratory distress.   Abdominal: Soft. Bowel sounds are normal. She exhibits no distension. There is no tenderness.   Musculoskeletal: Normal range of motion. She exhibits edema.   Large bulky splint to RLE - sensory / motor intact    Neurological: She is alert and oriented to person, place, and time.   Skin: Skin is warm and dry. She is not diaphoretic.   Multiple areas of ecchymosis    Psychiatric: She has a normal mood and affect. Her behavior is normal.   Nursing note and vitals reviewed.        CRANIAL NERVES     CN III, IV, VI   Pupils are equal, round, and reactive to light.  Extraocular motions are normal.        Significant Labs:   CBC:   Recent Labs   Lab  10/08/18   0500  10/09/18   0540   WBC  7.74  8.61   HGB  8.2*  8.0*   HCT  28.1*  25.6*   PLT  245  257     CMP:   Recent Labs   Lab  10/08/18   0500  10/09/18   0539   NA  138  135*   K  4.1  3.9   CL  100  101   CO2  29  27   GLU  165*  127*   BUN  15  20   CREATININE  1.0  1.0   CALCIUM  9.1  8.7   ANIONGAP  9  7*   EGFRNONAA  >60.0  >60.0       Significant Imaging: I have reviewed all pertinent imaging results/findings within the past 24 hours.     Medical record, imaging, EKG, and medications reviewed     Review of Systems   Constitutional: Negative for chills, diaphoresis and fever.   Eyes: Negative for photophobia and visual disturbance.   Respiratory: Negative for cough, shortness of breath and wheezing.    Cardiovascular: Negative for palpitations and leg swelling.   Gastrointestinal: Negative for abdominal pain, constipation, diarrhea, nausea and vomiting.   Genitourinary: Negative for dysuria and flank pain.   Musculoskeletal: Positive for gait problem, joint swelling and myalgias. Negative for arthralgias.   Skin: Negative for rash and wound (open appy scar LLQ healing well).   Neurological: Positive for weakness. Negative for syncope and headaches.   Psychiatric/Behavioral: Negative for agitation, confusion and decreased  concentration. The patient is not nervous/anxious.      Objective:     Vital Signs (Most Recent):  Temp: 98.7 °F (37.1 °C) (10/09/18 0828)  Pulse: 65 (10/09/18 0905)  Resp: 20 (10/09/18 0905)  BP: (!) 115/59 (10/09/18 0828)  SpO2: 97 % (10/09/18 0828) Vital Signs (24h Range):  Temp:  [60.8 °F (16 °C)-98.7 °F (37.1 °C)] 98.7 °F (37.1 °C)  Pulse:  [56-80] 65  Resp:  [16-20] 20  SpO2:  [92 %-98 %] 97 %  BP: (115-137)/(56-67) 115/59     Weight: 74.9 kg (165 lb 2 oz)  Body mass index is 32.25 kg/m².    Intake/Output Summary (Last 24 hours) at 10/9/2018 0945  Last data filed at 10/9/2018 0500  Gross per 24 hour   Intake 855 ml   Output --   Net 855 ml      Physical Exam   Constitutional: She is oriented to person, place, and time. She appears well-developed and well-nourished. No distress.   AAOx3 but distractible, poor concentration, somnolent   HENT:   Head: Normocephalic and atraumatic.   Eyes: Conjunctivae are normal. No scleral icterus.   Neck: Neck supple. No JVD present. No tracheal deviation present.   Cardiovascular: Normal rate, regular rhythm, normal heart sounds and intact distal pulses. Exam reveals no gallop and no friction rub.   Pulmonary/Chest: Effort normal and breath sounds normal. No stridor. No respiratory distress. She has no wheezes. She has no rales. She exhibits no tenderness.   Abdominal: Soft. Bowel sounds are normal. She exhibits no mass. There is no tenderness. There is no rebound and no guarding.   Musculoskeletal: She exhibits tenderness. She exhibits no edema or deformity.   R lower leg splint in place, leg elevated on pillow.   Neurological: She is alert and oriented to person, place, and time. No cranial nerve deficit or sensory deficit. Coordination normal.   Skin: Skin is warm and dry. She is not diaphoretic.   Psychiatric: She has a normal mood and affect. Her behavior is normal.   Vitals reviewed.      Significant Labs:   CBC:   Recent Labs   Lab  10/08/18   0500  10/09/18   0540    WBC  7.74  8.61   HGB  8.2*  8.0*   HCT  28.1*  25.6*   PLT  245  257     CMP:   Recent Labs   Lab  10/08/18   0500  10/09/18   0539   NA  138  135*   K  4.1  3.9   CL  100  101   CO2  29  27   GLU  165*  127*   BUN  15  20   CREATININE  1.0  1.0   CALCIUM  9.1  8.7   ANIONGAP  9  7*   EGFRNONAA  >60.0  >60.0     Recent Lab Results       10/09/18  0852 10/09/18  0540 10/09/18  0539 10/08/18  2135 10/08/18  1852      Immature Granulocytes  0.9        Immature Grans (Abs)  0.08  Comment:  Mild elevation in immature granulocytes is non specific and   can be seen in a variety of conditions including stress response,   acute inflammation, trauma and pregnancy. Correlation with other   laboratory and clinical findings is essential.          Anion Gap   7       Baso #  0.04        Basophil%  0.5        BUN, Bld   20       Calcium   8.7       Chloride   101       CO2   27       Creatinine   1.0       Differential Method  Automated        eGFR if    >60.0       eGFR if non    >60.0  Comment:  Calculation used to obtain the estimated glomerular filtration  rate (eGFR) is the CKD-EPI equation.          Eos #  0.1        Eosinophil%  0.9        Glucose   127       Gran # (ANC)  5.3        Gran%  61.0        Hematocrit  25.6        Hemoglobin  8.0        Lymph #  2.6        Lymph%  30.4        MCH  26.9        MCHC  31.3        MCV  86        Mono #  0.5        Mono%  6.3        MPV  10.1        nRBC  0        Platelets  257        POCT Glucose 151   294 244     Potassium   3.9       RBC  2.97        RDW  19.0        Sodium   135       WBC  8.61                    10/08/18  1308 10/08/18  1210      Immature Granulocytes       Immature Grans (Abs)       Anion Gap       Baso #       Basophil%       BUN, Bld       Calcium       Chloride       CO2       Creatinine       Differential Method       eGFR if        eGFR if non        Eos #       Eosinophil%       Glucose        Gran # (ANC)       Gran%       Hematocrit       Hemoglobin       Lymph #       Lymph%       MCH       MCHC       MCV       Mono #       Mono%       MPV       nRBC       Platelets       POCT Glucose 301 344     Potassium       RBC       RDW       Sodium       WBC             Significant Imaging: I have reviewed all pertinent imaging results/findings within the past 24 hours.    Assessment/Plan:      * Closed fracture of right tibia and fibula    Fall  - presents after fall from standing height following d/c home 2 days ago - no LOC or head trauma  - utilizes walker at baseline to assist with ambulation   - splint applied to RLE  - NWB to RLE  - PT/OT  - case management to assist with discharge planning   - PRN pain management   - CT R knee confirms closed non-displaced fx of proximal tibia and fibula  - non-operative management per Ortho, splint in place  - Pt pending SNF as has proven dangerous to remain at home despite her preference due to deconditioning from previous hospital course in addition to new fracture.        COPD (chronic obstructive pulmonary disease)    - denies SOB / wheezing  - in NAD  - resume home medication regimen  - supplemental oxygen as needed to maintain saturations > 92%  - Fluticasone/vilanterol 1 puff daily  - duo nebs PRN SOB / Wheezing   - Encourage CPAP at night especially on opioids for PRN pain, pt is reluctant though. Previous CO2 narcosis on admit with AMS requiring bipap                  Anemia    - Hgb 8.9 on arrival (last known baseline 9.2)  - iron supplementation initiated   - trend h/h daily          Moderate malnutrition    - most recent albumin 2.5 (10/01)  - prealbumin 20 on admit (borderline low)  - previous nutrition rec's 09/21/18 encourage po intake, 2000 diabetic / cardiac, Boost breeze TID        Essential hypertension    - Few occasions of SBP ranging between 130-181 since re-admit  - possibly elevated d/t discomfort  - resume home antihypertensive regimen   -  monitor and adjust therapy as indicated if persistently elevated                  Insulin dependent diabetes mellitus    24U detemir + 4U aspart with meals on prior discharge  Currently increased to 28U basal + 12U prandial + SSI today  - On 10mg pred od for asthma   - Continue to monitor BGL and titrate insulin as needed          VTE Risk Mitigation (From admission, onward)        Ordered     enoxaparin injection 40 mg  Daily      10/04/18 1022     IP VTE HIGH RISK PATIENT  Once      10/04/18 0338     Place MARGIE hose  Until discontinued      10/04/18 0338     Place sequential compression device  Until discontinued      10/04/18 0338              Wayne Morris MD  Department of Hospital Medicine   [facility]

## 2018-10-09 NOTE — PLAN OF CARE
.     10/09/18 1705   Discharge Reassessment   Assessment Type Discharge Planning Reassessment   Provided patient/caregiver education on the expected discharge date and the discharge plan Yes   Do you have any problems affording any of your prescribed medications? No   Discharge Plan A Skilled Nursing Facility   Discharge Plan B Home with family;Home Health   Can the patient answer the patient profile reliably? Yes, cognitively intact   How does the patient rate their overall health at the present time? Fair   Describe the patient's ability to walk at the present time. Does not walk or unable to take any steps at all   How often would a person be available to care for the patient? Occasionally   Number of comorbid conditions (as recorded on the chart) Five or more   During the past month, has the patient often been bothered by feeling down, depressed or hopeless? No   During the past month, has the patient often been bothered by little interest or pleasure in doing things? No

## 2018-10-09 NOTE — PLAN OF CARE
Problem: Diabetes, Type 2 (Adult)  Goal: Signs and Symptoms of Listed Potential Problems Will be Absent, Minimized or Managed (Diabetes, Type 2)  Signs and symptoms of listed potential problems will be absent, minimized or managed by discharge/transition of care (reference Diabetes, Type 2 (Adult) CPG).  Outcome: Ongoing (interventions implemented as appropriate)   10/09/18 1534   Diabetes, Type 2   Problems Assessed (Type 2 Diabetes) all;DKA (diabetic ketoacidosis)/HHS (hyperosmolar hyperglycemic state);hyperglycemia;hypoglycemia     Patient being monitored for signs and symptoms of hypo/hypergylcemia.Patient receiving PRN insulin per MAR. Patient is adequately eating meals and has adequate intake of fluids. No concerns at this time.     Problem: Fall Risk (Pediatric)  Goal: Identify Related Risk Factors and Signs and Symptoms  Related risk factors and signs and symptoms are identified upon initiation of Human Response Clinical Practice Guideline (CPG)  Outcome: Ongoing (interventions implemented as appropriate)   10/09/18 1534   Fall Risk   Related Risk Factors (Fall Risk) polypharmacy;sensory deficits;history of falls;unfamiliar environments     Fall precautions in place. No falls occurred during this shift. Will continue to monitor.     Problem: Patient Care Overview  Goal: Plan of Care Review  Outcome: Ongoing (interventions implemented as appropriate)   10/09/18 1534   Coping/Psychosocial   Plan Of Care Reviewed With patient     Patient is awake, alert and oriented. No apparent distress. VSS. Patient is able to verbalize pain relief and pain goals. Patient receiving PRN pain medication per MAR. POC discussed with patient.  Verbalizes understanding. Patient is being turned q.2hrs and RLE is elevated with pillows while patient is in bed. Patient has adequate intake and output. No other concerns at this time. Will continue to monitor.     Problem: Pressure Ulcer Risk (Finn Scale)  (Adult,Obstetrics,Pediatric)  Goal: Identify Related Risk Factors and Signs and Symptoms  Related risk factors and signs and symptoms are identified upon initiation of Human Response Clinical Practice Guideline (CPG)  Outcome: Ongoing (interventions implemented as appropriate)   10/09/18 1534   Pressure Ulcer Risk (Finn Scale)   Related Risk Factors (Pressure Ulcer Risk (Finn Scale)) body weight extremes;mobility impaired;hospitalization prolonged     Patient is able to turn self in bed with assistance. Patient being turned in q.2hrs by staff and as needed. No new skin concerns present this shift. Will continue to monitor.

## 2018-10-09 NOTE — PROGRESS NOTES
[facility]  Adult Nutrition  Progress Note    SUMMARY       Recommendations    Recommendation/Intervention: 1. Continue Diabetic 1500 diet + Boost Glucose Control 2. Provide Beneprotein BID. 3. RD to follow  Goals: 1. Pt will consume >50% meals and supplement.  Nutrition Goal Status: progressing towards goal  Communication of RD Recs: other (comment)(POC)    Reason for Assessment    Reason for Assessment: RD follow-up  Diagnosis: other (see comments)(closed fracture of right tibia and fibula)  Relevant Medical History: CAD, high cholesterol, diabetes, HTN, stroke, COPD  Interdisciplinary Rounds: did not attend  General Information Comments: Pt reports poor appetite since hospital stay beginning one month ago. Per pt, she has about 50% intake now. Performed NFPE: pt with broken leg has moderate wasting of  quadriceps. Pt appears overweight with mild overall wasting. Pt admitted with stage II medial sacral spine pressure injury. Dietary preferences noted and entered in [employer].  Nutrition Discharge Planning: d/c on DM diet     Nutrition Risk Screen    Nutrition Risk Screen: no indicators present    Nutrition/Diet History    Patient Reported Diet/Restrictions/Preferences: diabetic diet  Typical Food/Fluid Intake: decreased intake at SNf  Food Preferences: NKFA  Do you have any cultural, spiritual, Spiritism conflicts, given your current situation?: none  Factors Affecting Nutritional Intake: decreased appetite, nausea/vomiting    Anthropometrics    Temp: 97.1 °F (36.2 °C)  Height Method: Stated  Height: 5' (152.4 cm)  Height (inches): 60 in  Weight Method: Bed Scale  Weight: 74.9 kg (165 lb 2 oz)  Weight (lb): 165.13 lb  Ideal Body Weight (IBW), Female: 100 lb  % Ideal Body Weight, Female (lb): 165.13 lb  BMI (Calculated): 32.3  BMI Grade: 30 - 34.9- obesity - grade I       Lab/Procedures/Meds    Pertinent Labs Reviewed: reviewed  Pertinent Labs Comments: sodium 135, glucose 127, HbA1c  7.1  Pertinent Medications Reviewed: reviewed  Pertinent Medications Comments: statin , prednsion, senna-docusate, furosemide, ferrous, sulfate, enoxaparin, predisone, megesterol, clonidine, clopidogrel, ditizaem, duloxetine    Physical Findings/Assessment    Overall Physical Appearance: loss of muscle mass, obese, overweight  Oral/Mouth Cavity: WDL  Skin: intact    Estimated/Assessed Needs    Weight Used For Calorie Calculations: 74.9 kg (165 lb 2 oz)  Energy Calorie Requirements (kcal): 1470  Energy Need Method: Will-St Jeor  Protein Requirements: 45-54  Weight Used For Protein Calculations: 45.4 kg (100 lb 0 oz)  Fluid Requirements (mL): 1 ml/kcal or per MD   Fluid Need Method: RDA Method  RDA Method (mL): 1470  CHO Requirement: 50%      Nutrition Prescription Ordered    Current Diet Order: diabetic, 1500 calories  Oral Nutrition Supplement: Boost GC TID     Evaluation of Received Nutrient/Fluid Intake    Comments: LBM 10/1   % Intake of Estimated Energy Needs: 50 - 75 %  % Meal Intake: 50 - 75 %    Nutrition Risk    Level of Risk/Frequency of Follow-up: low - moderate     Assessment and Plan  Nutrition Problem  Inadequate oral intake    Related to (etiology):   Poor appetitie    Signs and Symptoms (as evidenced by):   Pt reports 50% intake x 1 month, pt with mild temporal and quadricep wasting      Nutrition Diagnosis Status:   Continues      Monitor and Evaluation    Food and Nutrient Intake: energy intake  Food and Nutrient Adminstration: diet order  Knowledge/Beliefs/Attitudes: food and nutrition knowledge/skill  Physical Activity and Function: factors affecting access to physical activity  Anthropometric Measurements: weight, weight change, body mass index  Biochemical Data, Medical Tests and Procedures: electrolyte and renal panel, gastrointestinal profile, glucose/endocrine profile, inflammatory profile, lipid profile  Nutrition-Focused Physical Findings: overall appearance, extremities, muscles and  bones, head and eyes, skin     Nutrition Follow-Up    RD Follow-up?: Yes

## 2018-10-09 NOTE — ASSESSMENT & PLAN NOTE
- denies CP / SOB  - EKG without obvious acute ischemic changes  - continue ASA and statin   - trend electrolytes and replete as indicated

## 2018-10-10 LAB
ANION GAP SERPL CALC-SCNC: 8 MMOL/L
BASOPHILS # BLD AUTO: 0.05 K/UL
BASOPHILS NFR BLD: 0.6 %
BUN SERPL-MCNC: 22 MG/DL
CALCIUM SERPL-MCNC: 8.7 MG/DL
CHLORIDE SERPL-SCNC: 102 MMOL/L
CO2 SERPL-SCNC: 26 MMOL/L
CREAT SERPL-MCNC: 1 MG/DL
DIFFERENTIAL METHOD: ABNORMAL
EOSINOPHIL # BLD AUTO: 0.1 K/UL
EOSINOPHIL NFR BLD: 0.8 %
ERYTHROCYTE [DISTWIDTH] IN BLOOD BY AUTOMATED COUNT: 19.1 %
EST. GFR  (AFRICAN AMERICAN): >60 ML/MIN/1.73 M^2
EST. GFR  (NON AFRICAN AMERICAN): >60 ML/MIN/1.73 M^2
GLUCOSE SERPL-MCNC: 78 MG/DL
HCT VFR BLD AUTO: 28.3 %
HGB BLD-MCNC: 8.6 G/DL
IMM GRANULOCYTES # BLD AUTO: 0.06 K/UL
IMM GRANULOCYTES NFR BLD AUTO: 0.7 %
LYMPHOCYTES # BLD AUTO: 2.7 K/UL
LYMPHOCYTES NFR BLD: 31 %
MCH RBC QN AUTO: 26.6 PG
MCHC RBC AUTO-ENTMCNC: 30.4 G/DL
MCV RBC AUTO: 88 FL
MONOCYTES # BLD AUTO: 0.6 K/UL
MONOCYTES NFR BLD: 6.9 %
NEUTROPHILS # BLD AUTO: 5.2 K/UL
NEUTROPHILS NFR BLD: 60 %
NRBC BLD-RTO: 0 /100 WBC
PLATELET # BLD AUTO: 271 K/UL
PMV BLD AUTO: 9.8 FL
POCT GLUCOSE: 115 MG/DL (ref 70–110)
POCT GLUCOSE: 129 MG/DL (ref 70–110)
POCT GLUCOSE: 251 MG/DL (ref 70–110)
POCT GLUCOSE: 97 MG/DL (ref 70–110)
POTASSIUM SERPL-SCNC: 4.1 MMOL/L
RBC # BLD AUTO: 3.23 M/UL
SODIUM SERPL-SCNC: 136 MMOL/L
WBC # BLD AUTO: 8.72 K/UL

## 2018-10-10 PROCEDURE — 97112 NEUROMUSCULAR REEDUCATION: CPT

## 2018-10-10 PROCEDURE — 80048 BASIC METABOLIC PNL TOTAL CA: CPT

## 2018-10-10 PROCEDURE — 97535 SELF CARE MNGMENT TRAINING: CPT

## 2018-10-10 PROCEDURE — 63600175 PHARM REV CODE 636 W HCPCS: Performed by: NURSE PRACTITIONER

## 2018-10-10 PROCEDURE — 36415 COLL VENOUS BLD VENIPUNCTURE: CPT

## 2018-10-10 PROCEDURE — 85025 COMPLETE CBC W/AUTO DIFF WBC: CPT

## 2018-10-10 PROCEDURE — 63600175 PHARM REV CODE 636 W HCPCS: Performed by: STUDENT IN AN ORGANIZED HEALTH CARE EDUCATION/TRAINING PROGRAM

## 2018-10-10 PROCEDURE — 25000003 PHARM REV CODE 250: Performed by: STUDENT IN AN ORGANIZED HEALTH CARE EDUCATION/TRAINING PROGRAM

## 2018-10-10 PROCEDURE — G0378 HOSPITAL OBSERVATION PER HR: HCPCS

## 2018-10-10 PROCEDURE — 97110 THERAPEUTIC EXERCISES: CPT

## 2018-10-10 PROCEDURE — 25000003 PHARM REV CODE 250: Performed by: NURSE PRACTITIONER

## 2018-10-10 RX ORDER — INSULIN ASPART 100 [IU]/ML
12 INJECTION, SOLUTION INTRAVENOUS; SUBCUTANEOUS 3 TIMES DAILY
Qty: 5 BOX | Refills: 10 | Status: ON HOLD | OUTPATIENT
Start: 2018-10-10 | End: 2018-11-03

## 2018-10-10 RX ORDER — INSULIN ASPART 100 [IU]/ML
0-5 INJECTION, SOLUTION INTRAVENOUS; SUBCUTANEOUS
Qty: 5 BOX | Refills: 10 | Status: ON HOLD | OUTPATIENT
Start: 2018-10-10 | End: 2018-11-03

## 2018-10-10 RX ADMIN — ACETAMINOPHEN 650 MG: 325 TABLET, FILM COATED ORAL at 01:10

## 2018-10-10 RX ADMIN — PREDNISONE 10 MG: 10 TABLET ORAL at 09:10

## 2018-10-10 RX ADMIN — LOSARTAN POTASSIUM 100 MG: 50 TABLET ORAL at 09:10

## 2018-10-10 RX ADMIN — INSULIN DETEMIR 28 UNITS: 100 INJECTION, SOLUTION SUBCUTANEOUS at 09:10

## 2018-10-10 RX ADMIN — SENNOSIDES AND DOCUSATE SODIUM 1 TABLET: 8.6; 5 TABLET ORAL at 09:10

## 2018-10-10 RX ADMIN — SIMVASTATIN 20 MG: 20 TABLET, FILM COATED ORAL at 09:10

## 2018-10-10 RX ADMIN — HYDRALAZINE HYDROCHLORIDE 25 MG: 25 TABLET ORAL at 06:10

## 2018-10-10 RX ADMIN — MEGESTROL ACETATE 40 MG: 40 TABLET ORAL at 09:10

## 2018-10-10 RX ADMIN — FLUTICASONE FUROATE AND VILANTEROL TRIFENATATE 1 PUFF: 100; 25 POWDER RESPIRATORY (INHALATION) at 09:10

## 2018-10-10 RX ADMIN — DILTIAZEM HYDROCHLORIDE 240 MG: 240 CAPSULE, COATED, EXTENDED RELEASE ORAL at 09:10

## 2018-10-10 RX ADMIN — TRAMADOL HYDROCHLORIDE 50 MG: 50 TABLET, FILM COATED ORAL at 09:10

## 2018-10-10 RX ADMIN — INSULIN ASPART 12 UNITS: 100 INJECTION, SOLUTION INTRAVENOUS; SUBCUTANEOUS at 09:10

## 2018-10-10 RX ADMIN — ACETAMINOPHEN 650 MG: 325 TABLET, FILM COATED ORAL at 09:10

## 2018-10-10 RX ADMIN — HYDRALAZINE HYDROCHLORIDE 25 MG: 25 TABLET ORAL at 01:10

## 2018-10-10 RX ADMIN — CLONIDINE HYDROCHLORIDE 0.3 MG: 0.2 TABLET ORAL at 09:10

## 2018-10-10 RX ADMIN — CLONIDINE HYDROCHLORIDE 0.3 MG: 0.2 TABLET ORAL at 03:10

## 2018-10-10 RX ADMIN — DULOXETINE HYDROCHLORIDE 60 MG: 30 CAPSULE, DELAYED RELEASE ORAL at 09:10

## 2018-10-10 RX ADMIN — FUROSEMIDE 40 MG: 40 TABLET ORAL at 09:10

## 2018-10-10 RX ADMIN — FERROUS SULFATE TAB EC 325 MG (65 MG FE EQUIVALENT) 325 MG: 325 (65 FE) TABLET DELAYED RESPONSE at 09:10

## 2018-10-10 RX ADMIN — ACETAMINOPHEN 650 MG: 325 TABLET, FILM COATED ORAL at 06:10

## 2018-10-10 RX ADMIN — PANTOPRAZOLE SODIUM 40 MG: 40 TABLET, DELAYED RELEASE ORAL at 09:10

## 2018-10-10 RX ADMIN — ASPIRIN 81 MG: 81 TABLET, COATED ORAL at 09:10

## 2018-10-10 RX ADMIN — CLOPIDOGREL 75 MG: 75 TABLET, FILM COATED ORAL at 09:10

## 2018-10-10 RX ADMIN — INSULIN ASPART 12 UNITS: 100 INJECTION, SOLUTION INTRAVENOUS; SUBCUTANEOUS at 01:10

## 2018-10-10 RX ADMIN — INSULIN ASPART 3 UNITS: 100 INJECTION, SOLUTION INTRAVENOUS; SUBCUTANEOUS at 01:10

## 2018-10-10 RX ADMIN — HYDRALAZINE HYDROCHLORIDE 25 MG: 25 TABLET ORAL at 09:10

## 2018-10-10 RX ADMIN — ENOXAPARIN SODIUM 40 MG: 100 INJECTION SUBCUTANEOUS at 06:10

## 2018-10-10 RX ADMIN — INSULIN ASPART 12 UNITS: 100 INJECTION, SOLUTION INTRAVENOUS; SUBCUTANEOUS at 06:10

## 2018-10-10 NOTE — PLAN OF CARE
[facility]    HOME HEALTH ORDERS  FACE TO FACE ENCOUNTER    Patient Name: Sheryl Martines  YOB: 1955    PCP: Primary Doctor No   PCP Address: None  PCP Phone Number: None  PCP Fax: None    Encounter Date: 10/10/2018    Admit to Home Health    Diagnoses:  Active Hospital Problems    Diagnosis  POA    *Closed fracture of right tibia and fibula [S82.201A, S82.401A]  Yes    Fall [W19.XXXA]  Yes    Anemia [D64.9]  Yes    COPD (chronic obstructive pulmonary disease) [J44.9]  Yes    Essential hypertension [I10]  Yes    Moderate malnutrition [E44.0]  Yes     Moderate Malnutrition in the context of Acute Illness/Injury    Related to (etiology):  Decreased intake 2/2 nausea/vomiting and s/p appendectomy    Signs and Symptoms (as evidenced by):  Energy Intake: <75% of estimated energy requirement for >1 week  Muscle Mass Depletion: moderate depletion of temples, clavicle region and interosseous muscle   Weight Loss: 11.5% x 1 week    Interventions/Recommendations (treatment strategy):  Encourage po; continue 2000 diabetic; cardiac diet; Boost breeze TID; maintain/gain wt    Nutrition Diagnosis Status:  New      CAD (coronary artery disease) [I25.10]  Yes    Insulin dependent diabetes mellitus [E11.9, Z79.4]  Not Applicable      Resolved Hospital Problems   No resolved problems to display.       Future Appointments   Date Time Provider Department Center   10/15/2018  8:00 AM Sarah Lujan PA-C [facility]     Follow-up Information     Schedule an appointment as soon as possible for a visit with [facility].    Specialty:  Orthopedics  Why:  Follow up for tibial/fibular fracture  Contact information:  [address]  [facility] [address]  [phone]                   I have seen and examined this patient face to face today. My clinical findings that support the need for the home health skilled services and home bound status are the  following:  Weakness/numbness causing balance and gait disturbance due to Fracture and Weakness/Debility making it taxing to leave home.  Requiring assistive device to leave home due to unsteady gait caused by  Fracture and Weakness/Debility.  Medical restrictions requiring assistance of another human to leave home due to  Unstable ambulation and Frequent Falls.    Allergies:  Review of patient's allergies indicates:   Allergen Reactions    Ace inhibitors Swelling    Corticosteroids (glucocorticoids)     Hydralazine analogues     Tetracyclines Swelling    Travatan (with benzalkonium) [travoprost (benzalkonium)]        Diet: diabetic diet: 1800 calorie    Activities: ambulate in house with assistance    Nursing:   SN to complete comprehensive assessment including routine vital signs. Instruct on disease process and s/s of complications to report to MD. Review/verify medication list sent home with the patient at time of discharge  and instruct patient/caregiver as needed. Frequency may be adjusted depending on start of care date.    Notify MD if SBP > 160 or < 90; DBP > 90 or < 50; HR > 120 or < 50; Temp > 101; Other:         CONSULTS:    Physical Therapy to evaluate and treat. Evaluate for home safety and equipment needs; Establish/upgrade home exercise program. Perform / instruct on therapeutic exercises, gait training, transfer training, and Range of Motion.  Occupational Therapy to evaluate and treat. Evaluate home environment for safety and equipment needs. Perform/Instruct on transfers, ADL training, ROM, and therapeutic exercises.    MISCELLANEOUS CARE:  Routine Skin for Bedridden Patients: Instruct patient/caregiver to apply moisture barrier cream to all skin folds and wet areas in perineal area daily and after baths and all bowel movements.  Diabetic Care:   SN to perform and educate Diabetic management with blood glucose monitoring:  COPD: Teach patient MDI/HFA usage and guidelines    WOUND CARE  ORDERS  n/a      Medications: Review discharge medications with patient and family and provide education.      Current Discharge Medication List      START taking these medications    Details   ferrous sulfate 325 (65 FE) MG EC tablet Take 1 tablet (325 mg total) by mouth once daily.  Refills: 0      !! insulin aspart U-100 (NOVOLOG) 100 unit/mL InPn pen Inject 0-5 Units into the skin 4 (four) times daily before meals and nightly.  Qty: 5 Box, Refills: 10    Comments: Can adjust to equivalent product to meet patient's insurance      !! insulin aspart U-100 (NOVOLOG) 100 unit/mL InPn pen Inject 12 Units into the skin 3 (three) times daily.  Qty: 5 Box, Refills: 10       !! - Potential duplicate medications found. Please discuss with provider.      CONTINUE these medications which have CHANGED    Details   insulin detemir U-100 (LEVEMIR FLEXTOUCH) 100 unit/mL (3 mL) SubQ InPn pen Inject 28 Units into the skin once daily.  Qty: 5 Box, Refills: 10         CONTINUE these medications which have NOT CHANGED    Details   acetaminophen (TYLENOL) 325 MG tablet Take 2 tablets (650 mg total) by mouth every 6 (six) hours as needed for Pain.      albuterol-ipratropium (DUO-NEB) 2.5 mg-0.5 mg/3 mL nebulizer solution Take 3 mLs by nebulization every 4 (four) hours. Rescue      aspirin (ECOTRIN) 81 MG EC tablet Take 1 tablet (81 mg total) by mouth once daily.  Qty: 30 tablet, Refills: 11      cloNIDine (CATAPRES) 0.3 MG tablet Take 1 tablet (0.3 mg total) by mouth 3 (three) times daily.  Qty: 90 tablet, Refills: 3      clopidogrel (PLAVIX) 75 mg tablet Take 75 mg by mouth once daily.      diltiaZEM (CARDIZEM CD) 240 MG 24 hr capsule Take 1 capsule (240 mg total) by mouth once daily.  Qty: 30 capsule, Refills: 11      DULoxetine (CYMBALTA) 60 MG capsule Take 60 mg by mouth once daily.      fluticasone-vilanterol (BREO) 100-25 mcg/dose diskus inhaler Inhale 1 puff into the lungs once daily. Controller  Qty: 60 each, Refills: 3       furosemide (LASIX) 40 MG tablet Take 40 mg by mouth once daily.       hydrALAZINE (APRESOLINE) 25 MG tablet Take 1 tablet (25 mg total) by mouth every 8 (eight) hours.  Qty: 90 tablet, Refills: 3      Lactobacillus acidophilus 1 billion cell Cap Take 1 tablet by mouth 2 (two) times daily.      losartan (COZAAR) 100 MG tablet Take 100 mg by mouth once daily.       megestrol (MEGACE) 40 MG Tab Take 40 mg by mouth 2 (two) times daily.      ondansetron (ZOFRAN) 4 MG tablet Take 1 tablet (4 mg total) by mouth every 6 (six) hours as needed.  Qty: 30 tablet, Refills: 1      pantoprazole (PROTONIX) 40 MG tablet Take 40 mg by mouth once daily.      predniSONE (DELTASONE) 10 MG tablet Take 10 mg by mouth once daily.       pyridoxine, vitamin B6, (VITAMIN B-6) 50 MG Tab Take 1 tablet (50 mg total) by mouth once daily.  Refills: 0      senna-docusate 8.6-50 mg (SENNA WITH DOCUSATE SODIUM) 8.6-50 mg per tablet Take 1 tablet by mouth 2 (two) times daily.      simethicone 80 mg Tab Take 160 mg by mouth every 6 (six) hours as needed for Flatulence.      simvastatin (ZOCOR) 40 MG tablet Take 0.5 tablets (20 mg total) by mouth every evening.  Qty: 30 tablet, Refills: 3         STOP taking these medications       insulin lispro (HUMALOG KWIKPEN) 100 unit/mL InPn pen Comments:   Reason for Stopping:         traMADol (ULTRAM) 50 mg tablet Comments:   Reason for Stopping:               I certify that this patient is confined to her home and needs intermittent skilled nursing care, physical therapy and occupational therapy.

## 2018-10-10 NOTE — PLAN OF CARE
Problem: Patient Care Overview  Goal: Plan of Care Review  Outcome: Ongoing (interventions implemented as appropriate)  POC reviewed with patient.  No significant events overnight.  Turned q2hr.  RLE pain well managed.  VSS.  Safety maintained.

## 2018-10-10 NOTE — ASSESSMENT & PLAN NOTE
24U detemir + 4U aspart with meals on prior discharge  Currently increased to 28U basal + 12U prandial + SSI  - On 10mg pred od for asthma   - Continue to monitor BGL and titrate insulin as needed

## 2018-10-10 NOTE — PT/OT/SLP PROGRESS
Occupational Therapy   Treatment    Name: Sheryl Martines  MRN: [id number]  Admitting Diagnosis:  Closed fracture of right tibia and fibula       Recommendations:     Discharge Recommendations: nursing facility, skilled  Discharge Equipment Recommendations:  (TBD)  Barriers to discharge:  Inaccessible home environment    Subjective     Communicated with: RN prior to session.  Pain/Comfort:  · Pain Rating 1: 3/10  · Location - Side 1: Right  · Location 1: leg  · Pain Addressed 1: Reposition  · Pain Rating Post-Intervention 1: 3/10    Patients cultural, spiritual, Religion conflicts given the current situation: none    Objective:     Patient found with: telemetry    General Precautions: Standard, fall   Orthopedic Precautions:RLE non weight bearing   Braces: (long cast on RLE)     Occupational Performance:    Bed Mobility:    · Patient completed Rolling/Turning to Left with  minimum assistance ( R LE management)  · Patient completed Supine to Sit with minimum assistance (R LE management)  · Patient completed Sit to Supine with minimum assistance ( R LE management)    Sitting EOB:   Pt sat EOB ~25 minutes with SBA for safety; Challenging core control and static/dynamic sitting balance.    Functional Mobility/Transfers:  · Patient completed Sit <> Stand Transfer with minimum assistance  with  rolling walker    * Pt completed x4 trials sit <> stand from EOB requiring minimal assistance ( pt able to maintain standing position for ~15 seconds each trial however required rest breaks 2/2 fatigue)    Activities of Daily Living:  · Feeding:  stand by assistance to feed self while seated EOB ( pt able to open all containers without set-up assistance provided)  · Grooming: stand by assistance with set-up assistance to wash face while seated EOB  · Upper Body Dressing: stand by assistance and set-up assistance to tre gown like jacket while seated EOB    Patient left with bed in chair position with all lines intact, call button  in reach and RN notified    [facility] 6 Click:  [facility] Total Score: 17    Treatment & Education:  -Pt edu on OT role/POC  -Importance of OOB activity with staff assistance  -Safety during functional t/f and mobility ( RW management, R LE weight bearing px's)   - White board updated  - Multiple self care tasks completed-- assistance level noted above  - All questions/conerns answered within OT scope of practice  - Yellow t-band in room; educated pt on BUE HEP to complete 2-3x daily. Pt demo'd understanding.   - Pt completed BUE AROM exercises while seated EOB including: 1x10 reps in  elbow flexion, extension, and shoulder horizontal abduction. Pt tolerated well with min rest breaks between each exercise.     Education:    Assessment:     Sheryl Martines is a 63 y.o. female with a medical diagnosis of Closed fracture of right tibia and fibula.  She presents alert and motivated to participate with therapy. Pt complains of minimal pain in R LE throughout session. She demo's progression with bedmobility/functional transfers and self care tasks this date.  Performance deficits affecting function are weakness, impaired endurance, gait instability, impaired balance, impaired self care skills, impaired functional mobilty, pain, edema.  Pt would benefit from SNF following d/c to continue to progress towards goals and improve quality of life.     Rehab Prognosis:  Good; patient would benefit from acute skilled OT services to address these deficits and reach maximum level of function.       Plan:     Patient to be seen 4 x/week to address the above listed problems via self-care/home management, therapeutic activities, therapeutic exercises, neuromuscular re-education  · Plan of Care Expires: 11/13/18  · Plan of Care Reviewed with: patient    This Plan of care has been discussed with the patient who was involved in its development and understands and is in agreement with the identified goals and treatment plan    GOALS:    Multidisciplinary Problems     Occupational Therapy Goals        Problem: Occupational Therapy Goal    Goal Priority Disciplines Outcome Interventions   Occupational Therapy Goal     OT, PT/OT Ongoing (interventions implemented as appropriate)    Description:  Goals to be met by: 10/18/18     Patient will increase functional independence with ADLs by performing:    UE Dressing with Set-up Assistance and Supervision.  LE Dressing with Minimal Assistance, with modification/AE prn  Grooming while seated with Supervision. Met 10/8  Toileting from bedside commode with Moderate Assistance for hygiene and clothing management.   Toilet transfer to bedside commode with Minimal Assistance.                       Time Tracking:     OT Date of Treatment: 10/10/18  OT Start Time: 0844  OT Stop Time: 0923  OT Total Time (min): 39 min    Billable Minutes:Self Care/Home Management 15  Therapeutic Exercise 10  Neuromuscular Re-education 14    Rosette goldman OT  10/10/2018

## 2018-10-10 NOTE — ASSESSMENT & PLAN NOTE
Fall  - presents after fall from standing height following d/c home 2 days ago - no LOC or head trauma  - utilizes walker at baseline to assist with ambulation   - splint applied to RLE  - NWB to RLE  - PT/OT  - case management to assist with discharge planning   - PRN pain management   - CT R knee confirms closed non-displaced fx of proximal tibia and fibula  - non-operative management per Ortho, splint in place  - Pt originally pending SNF as has proven dangerous to remain at home despite her preference due to deconditioning from previous hospital course in addition to new fracture. However, patient wishes to stay in [place] with daughter, but her insurer will only cover SNF placement in [place]. Due to this the pt and daughter have decided for her to go back home to the daughter's residence despite recommendations of SNF as pt at very high risk for having another fall secondary to her deconditioning complicated now by tibial/fibular fracture occurring at previous attempted discharge. In spite of this due to the patient's financial/insurer situation they have decided this is the best option for them.

## 2018-10-10 NOTE — ASSESSMENT & PLAN NOTE
- denies SOB / wheezing  - in NAD  - resume home medication regimen  - supplemental oxygen as needed to maintain saturations > 92%  - Fluticasone/vilanterol 1 puff daily  - duo nebs PRN SOB / Wheezing   - Encourage CPAP at night especially on opioids for PRN pain, pt is reluctant though. Previous CO2 narcosis on admit with AMS requiring bipap    Consider CPAP at home if continued issues with CO2 retention / STELLA

## 2018-10-10 NOTE — PLAN OF CARE
Problem: Occupational Therapy Goal  Goal: Occupational Therapy Goal  Goals to be met by: 10/18/18     Patient will increase functional independence with ADLs by performing:    UE Dressing with Set-up Assistance and Supervision.  LE Dressing with Minimal Assistance, with modification/AE prn  Grooming while seated with Supervision. Met 10/8  Toileting from bedside commode with Moderate Assistance for hygiene and clothing management.   Toilet transfer to bedside commode with Minimal Assistance.      Outcome: Ongoing (interventions implemented as appropriate)  Pt progressing well with goals. Continue with POC.   Rosette goldman OT  10/10/2018

## 2018-10-10 NOTE — PT/OT/SLP PROGRESS
Physical Therapy      Patient Name:  Sheryl Martines   MRN:  [id number]    Patient not seen today secondary to patient eating lunch at first attempt at 1335. PT returned a short time later and patient was still eating lunch. PT returned at 1412 and patient was receiving a bed bath. Unable to return for additional attempt  . Will follow-up next scheduled therapy day..    Eileen Garcia, PT

## 2018-10-10 NOTE — PLAN OF CARE
Problem: Fall Risk (Pediatric)  Goal: Identify Related Risk Factors and Signs and Symptoms  Related risk factors and signs and symptoms are identified upon initiation of Human [employer] Clinical Practice Guideline (CPG)  Outcome: Ongoing (interventions implemented as appropriate)  Pt will remain free of falls during hospital stay.

## 2018-10-10 NOTE — PLAN OF CARE
ION spoke with pt's daughter Kandice, who expressed concerns about cost of pt going to a NH.  Kandice stated that  She visited [facility] and was told that because pt was not a [place] resident, they would have to assess a charge of $220.00 per day.  ION explained that this would be any facility.  Kandice stated that she and pt were not able to afford this level of care and would like pt to return home with HH and sitters.  ION explained that HH only comes for approximately 30 minutes each visit and no one stays in the home with the pt.  Kandice expressed total understanding and stated that she would be employing sitters.  ION explained that Dr. Pizano would be calling Kandice to discuss plan.  ION called Dr. Pizano and asked for a return call to discuss as well as provide Kandice's phone number.  ION will F/U.          Tamika Kaminski LMSW

## 2018-10-10 NOTE — DISCHARGE SUMMARY
[facility] Hospital Medicine  Discharge Summary      Patient Name: Sheryl Martines  MRN: [id number]  Admission Date: 10/3/2018  Hospital Length of Stay: 0 days  Discharge Date and Time:  10/10/2018 5:06 PM  Attending Physician: Servando García, *   Discharging Provider: Wayne Morris MD  Primary Care Provider: Primary Doctor [facility] Medicine Team: [facility] 1 Wayne Morris MD    HPI:   64 y/o female, who presents to the ED with R knee pain after fall from standing height.  She has a PMH of CVA, COPD, CAD, HTN, DM, HLD, and asthma.  She states that she was walking to her front step when her knees buckled, and she fell landing on her knees.  She reports using a walker at baseline to assist with ambulation.  She denies LOC or head trauma.  She was in substantial pain while in ED and received 5mg oxycodone IR, 15mg morphine PO, and 6mg morphine IM.  Imaging reveals nondisplaced right proximal tibial / fibular fractures.  She was evaluated by orthopedics while in ED and a splint was applied.     Recently admitted from SNF on 09/23/18 for AMS r/t possible Co2 narcosis, and was discharged home with home health on 10/2/2018.     * No surgery found *      Hospital Course:   Pt D/C home 2 days ago after prolonged stay in SNF and hospital for open appy complicated by infection and CO2 narcosis associated with AMS. Pt was discharged mentating and baseline and without signs of infection. It was recommended she d/c to SNF for rehab and additional care due to her deconditioning but pt was adamant about wanting to return home. Pt knees gave out while ambulating outside her home and collapsed to knees and then falling on her side, resulting in closed, non-displaced proximal fracture of the R tibia and fibula    Knee xray and CT knee demonstrate non-displaced fractures of the proximal tibia and fibula. For non-operative management per ortho. Leg splinted in ED. Other than her new fracture her medical  condition is as per recent hospital d/c without any new issues or medical deterioration. Patient wishes to stay in [place] with daughter, but her insurer will only cover SNF placement in [place]. Due to this the pt and daughter have decided for her to go back home to the daughter's residence despite recommendations of SNF due to pt at very high risk for having another fall secondary to her deconditioning complicated now by tibial/fibular fracture. In spite of this due to the patient's financial/insurer situation they have decided this is the best option for them.      Physical Exam   Constitutional: She is oriented to person, place, and time. She appears well-developed and well-nourished. No distress.   HENT:   Head: Normocephalic and atraumatic.   Eyes: Conjunctivae are normal. No scleral icterus.   Neck: Neck supple. No JVD present. No tracheal deviation present.   Cardiovascular: Normal rate, regular rhythm, normal heart sounds and intact distal pulses. Exam reveals no gallop and no friction rub.   Pulmonary/Chest: Effort normal and breath sounds normal. No stridor. No respiratory distress. She has no wheezes. She has no rales. She exhibits no tenderness.   Abdominal: Soft. Bowel sounds are normal. She exhibits no mass. There is no tenderness. There is no rebound and no guarding.   Musculoskeletal: She exhibits tenderness. She exhibits no edema.   Splint on R lower leg, elevated on pillow. Pain with articulation or movement of R leg due to tibial/fibular fracture   Neurological: She is alert and oriented to person, place, and time. No cranial nerve deficit or sensory deficit. Coordination normal.   Skin: Skin is warm and dry. She is not diaphoretic.   Psychiatric: She has a normal mood and affect. Her behavior is normal.         Consults:   Consults (From admission, onward)        Status Ordering Provider     Inpatient consult to Orthopedic Surgery  Once     Provider:  (Not yet assigned)    Jonnie RODNEY  FIONA MAYA     Inpatient consult to Registered Dietitian/Nutritionist  Once     Provider:  (Not yet assigned)    Completed CIARA MCKEON     Inpatient consult to SNF [facility]  Once     Provider:  (Not yet assigned)    Completed CAS CORRALES     Inpatient consult to SNF Nursing Home  Once     Provider:  (Not yet assigned)    Acknowledged CIARA MCKEON          * Closed fracture of right tibia and fibula    Fall  - presents after fall from standing height following d/c home 2 days ago - no LOC or head trauma  - utilizes walker at baseline to assist with ambulation   - splint applied to RLE  - NWB to RLE  - PT/OT  - case management to assist with discharge planning   - PRN pain management   - CT R knee confirms closed non-displaced fx of proximal tibia and fibula  - non-operative management per Ortho, splint in place  - Pt originally pending SNF as has proven dangerous to remain at home despite her preference due to deconditioning from previous hospital course in addition to new fracture. However, patient wishes to stay in [place] with daughter, but her insurer will only cover SNF placement in [place]. Due to this the pt and daughter have decided for her to go back home to the daughter's residence despite recommendations of SNF as pt at very high risk for having another fall secondary to her deconditioning complicated now by tibial/fibular fracture occurring at previous attempted discharge. In spite of this due to the patient's financial/insurer situation they have decided this is the best option for them.         COPD (chronic obstructive pulmonary disease)    - denies SOB / wheezing  - in NAD  - resume home medication regimen  - supplemental oxygen as needed to maintain saturations > 92%  - Fluticasone/vilanterol 1 puff daily  - duo nebs PRN SOB / Wheezing   - Encourage CPAP at night especially on opioids for PRN pain, pt is reluctant though. Previous CO2 narcosis on admit with AMS requiring  bipap    Consider CPAP at home if continued issues with CO2 retention / STELLA        Anemia    - Hgb 8.9 on arrival (last known baseline 9.2)  - iron supplementation initiated         Essential hypertension    - Few occasions of SBP ranging between 130-181 since re-admit  - possibly elevated d/t discomfort  - resume home antihypertensive regimen   - monitor and adjust therapy as indicated if persistently elevated        Insulin dependent diabetes mellitus    24U detemir + 4U aspart with meals on prior discharge  Currently increased to 28U basal + 12U prandial + SSI  - On 10mg pred od for asthma   - Continue to monitor BGL and titrate insulin as needed          Final Active Diagnoses:    Diagnosis Date Noted POA    PRINCIPAL PROBLEM:  Closed fracture of right tibia and fibula [S82.201A, S82.401A] 10/03/2018 Yes    Fall [W19.XXXA] 10/04/2018 Yes    Anemia [D64.9] 10/04/2018 Yes    COPD (chronic obstructive pulmonary disease) [J44.9] 10/04/2018 Yes    Essential hypertension [I10] 09/21/2018 Yes    Moderate malnutrition [E44.0] 09/21/2018 Yes    CAD (coronary artery disease) [I25.10] 08/23/2018 Yes    Insulin dependent diabetes mellitus [E11.9, Z79.4] 08/23/2018 Not Applicable      Problems Resolved During this Admission:       Discharged Condition: fair    Disposition: Skilled Nursing Facility    Follow Up:  Follow-up Information     Schedule an appointment as soon as possible for a visit with [facility] ORTH[facility]EDICS.    Specialty:  Orthopedics  Why:  Follow up for tibial/fibular fracture  Contact information:  [address] [place] [address] [id number] [phone]               Patient Instructions:      Ambulatory Referral to Orthopedics   Referral Priority: Routine Referral Type: Consultation   Referral Reason: Specialty Services Required   Requested Specialty: Orthopedic Surgery   Number of Visits Requested: 1       Significant Diagnostic Studies: Radiology: X-Ray: Tibia/fibular fracture of R leg closed,  non-displaced    Pending Diagnostic Studies:     None         Medications:  Reconciled Home Medications:      Medication List      START taking these medications    ferrous sulfate 325 (65 FE) MG EC tablet  Take 1 tablet (325 mg total) by mouth once daily.     * insulin aspart U-100 100 unit/mL Inpn pen  Commonly known as:  NovoLOG  Inject 0-5 Units into the skin 4 (four) times daily before meals and nightly.     * insulin aspart U-100 100 unit/mL Inpn pen  Commonly known as:  NovoLOG  Inject 12 Units into the skin 3 (three) times daily.         * This list has 2 medication(s) that are the same as other medications prescribed for you. Read the directions carefully, and ask your doctor or other care provider to review them with you.            CHANGE how you take these medications    insulin detemir U-100 100 unit/mL (3 mL) Inpn pen  Commonly known as:  LEVEMIR FLEXTOUCH  Inject 28 Units into the skin once daily.  What changed:  how much to take        CONTINUE taking these medications    acetaminophen 325 MG tablet  Commonly known as:  TYLENOL  Take 2 tablets (650 mg total) by mouth every 6 (six) hours as needed for Pain.     albuterol-ipratropium 2.5 mg-0.5 mg/3 mL nebulizer solution  Commonly known as:  DUO-NEB  Take 3 mLs by nebulization every 4 (four) hours. Rescue     aspirin 81 MG EC tablet  Commonly known as:  ECOTRIN  Take 1 tablet (81 mg total) by mouth once daily.     cloNIDine 0.3 MG tablet  Commonly known as:  CATAPRES  Take 1 tablet (0.3 mg total) by mouth 3 (three) times daily.     clopidogrel 75 mg tablet  Commonly known as:  PLAVIX  Take 75 mg by mouth once daily.     COZAAR 100 MG tablet  Generic drug:  losartan  Take 100 mg by mouth once daily.     diltiaZEM 240 MG 24 hr capsule  Commonly known as:  CARDIZEM CD  Take 1 capsule (240 mg total) by mouth once daily.     DULoxetine 60 MG capsule  Commonly known as:  CYMBALTA  Take 60 mg by mouth once daily.     fluticasone-vilanterol 100-25 mcg/dose  diskus inhaler  Commonly known as:  BREO  Inhale 1 puff into the lungs once daily. Controller     furosemide 40 MG tablet  Commonly known as:  LASIX  Take 40 mg by mouth once daily.     hydrALAZINE 25 MG tablet  Commonly known as:  APRESOLINE  Take 1 tablet (25 mg total) by mouth every 8 (eight) hours.     Lactobacillus acidophilus 1 billion cell Cap  Take 1 tablet by mouth 2 (two) times daily.     megestrol 40 MG Tab  Commonly known as:  MEGACE  Take 40 mg by mouth 2 (two) times daily.     ondansetron 4 MG tablet  Commonly known as:  ZOFRAN  Take 1 tablet (4 mg total) by mouth every 6 (six) hours as needed.     pantoprazole 40 MG tablet  Commonly known as:  PROTONIX  Take 40 mg by mouth once daily.     predniSONE 10 MG tablet  Commonly known as:  DELTASONE  Take 10 mg by mouth once daily.     pyridoxine (vitamin B6) 50 MG Tab  Commonly known as:  VITAMIN B-6  Take 1 tablet (50 mg total) by mouth once daily.     SENNA WITH DOCUSATE SODIUM 8.6-50 mg per tablet  Generic drug:  senna-docusate 8.6-50 mg  Take 1 tablet by mouth 2 (two) times daily.     simethicone 80 mg Tab  Take 160 mg by mouth every 6 (six) hours as needed for Flatulence.     simvastatin 40 MG tablet  Commonly known as:  ZOCOR  Take 0.5 tablets (20 mg total) by mouth every evening.        STOP taking these medications    insulin lispro 100 unit/mL Inpn pen  Commonly known as:  HUMALOG KWIKPEN     traMADol 50 mg tablet  Commonly known as:  ULTRAM            Indwelling Lines/Drains at time of discharge:   Lines/Drains/Airways     Central Venous Catheter Line                 Port A Cath Single Lumen right subclavian -- days         Port A Cath Single Lumen 10/04/18 0302 right subclavian 6 days          Pressure Ulcer                 Pressure Injury 09/11/18 1515 medial Sacral spine Stage 2 29 days                Time spent on the discharge of patient: 45 minutes  Patient was seen and examined on the date of discharge and determined to be suitable for  discharge.         Wayne Morris MD  Department of Hospital Medicine  [facility]

## 2018-10-10 NOTE — PLAN OF CARE
10/10/18 1718   Discharge Reassessment   Assessment Type Discharge Planning Reassessment   Do you have any problems affording any of your prescribed medications? No   Discharge Plan A Skilled Nursing Facility   Discharge Plan B Home with family;Home Health   Can the patient answer the patient profile reliably? Yes, cognitively intact   How does the patient rate their overall health at the present time? Fair   Describe the patient's ability to walk at the present time. Does not walk or unable to take any steps at all   How often would a person be available to care for the patient? Occasionally   Number of comorbid conditions (as recorded on the chart) Five or more

## 2018-10-11 LAB
ANION GAP SERPL CALC-SCNC: 9 MMOL/L
BASOPHILS # BLD AUTO: 0.04 K/UL
BASOPHILS NFR BLD: 0.4 %
BUN SERPL-MCNC: 23 MG/DL
CALCIUM SERPL-MCNC: 9 MG/DL
CHLORIDE SERPL-SCNC: 103 MMOL/L
CO2 SERPL-SCNC: 27 MMOL/L
CREAT SERPL-MCNC: 0.9 MG/DL
DIFFERENTIAL METHOD: ABNORMAL
EOSINOPHIL # BLD AUTO: 0.1 K/UL
EOSINOPHIL NFR BLD: 0.9 %
ERYTHROCYTE [DISTWIDTH] IN BLOOD BY AUTOMATED COUNT: 19.5 %
EST. GFR  (AFRICAN AMERICAN): >60 ML/MIN/1.73 M^2
EST. GFR  (NON AFRICAN AMERICAN): >60 ML/MIN/1.73 M^2
GLUCOSE SERPL-MCNC: 449 MG/DL
GLUCOSE SERPL-MCNC: 69 MG/DL
HCT VFR BLD AUTO: 28.2 %
HGB BLD-MCNC: 8.7 G/DL
IMM GRANULOCYTES # BLD AUTO: 0.07 K/UL
IMM GRANULOCYTES NFR BLD AUTO: 0.8 %
LYMPHOCYTES # BLD AUTO: 2.9 K/UL
LYMPHOCYTES NFR BLD: 32.1 %
MAGNESIUM SERPL-MCNC: 1.8 MG/DL
MCH RBC QN AUTO: 27.1 PG
MCHC RBC AUTO-ENTMCNC: 30.9 G/DL
MCV RBC AUTO: 88 FL
MONOCYTES # BLD AUTO: 0.6 K/UL
MONOCYTES NFR BLD: 6.4 %
NEUTROPHILS # BLD AUTO: 5.4 K/UL
NEUTROPHILS NFR BLD: 59.4 %
NRBC BLD-RTO: 0 /100 WBC
PHOSPHATE SERPL-MCNC: 2.7 MG/DL
PLATELET # BLD AUTO: 305 K/UL
PMV BLD AUTO: 10 FL
POCT GLUCOSE: 178 MG/DL (ref 70–110)
POCT GLUCOSE: 192 MG/DL (ref 70–110)
POCT GLUCOSE: 378 MG/DL (ref 70–110)
POCT GLUCOSE: 426 MG/DL (ref 70–110)
POCT GLUCOSE: 76 MG/DL (ref 70–110)
POTASSIUM SERPL-SCNC: 4.5 MMOL/L
RBC # BLD AUTO: 3.21 M/UL
SODIUM SERPL-SCNC: 139 MMOL/L
WBC # BLD AUTO: 9.06 K/UL

## 2018-10-11 PROCEDURE — 84100 ASSAY OF PHOSPHORUS: CPT

## 2018-10-11 PROCEDURE — 25000003 PHARM REV CODE 250: Performed by: STUDENT IN AN ORGANIZED HEALTH CARE EDUCATION/TRAINING PROGRAM

## 2018-10-11 PROCEDURE — 80048 BASIC METABOLIC PNL TOTAL CA: CPT

## 2018-10-11 PROCEDURE — 83735 ASSAY OF MAGNESIUM: CPT

## 2018-10-11 PROCEDURE — 85025 COMPLETE CBC W/AUTO DIFF WBC: CPT

## 2018-10-11 PROCEDURE — 63600175 PHARM REV CODE 636 W HCPCS: Performed by: STUDENT IN AN ORGANIZED HEALTH CARE EDUCATION/TRAINING PROGRAM

## 2018-10-11 PROCEDURE — 36415 COLL VENOUS BLD VENIPUNCTURE: CPT

## 2018-10-11 PROCEDURE — 97535 SELF CARE MNGMENT TRAINING: CPT | Mod: 59

## 2018-10-11 PROCEDURE — G0378 HOSPITAL OBSERVATION PER HR: HCPCS

## 2018-10-11 PROCEDURE — 25000003 PHARM REV CODE 250: Performed by: NURSE PRACTITIONER

## 2018-10-11 PROCEDURE — 82947 ASSAY GLUCOSE BLOOD QUANT: CPT | Mod: 59

## 2018-10-11 PROCEDURE — G8987 SELF CARE CURRENT STATUS: HCPCS | Mod: CK

## 2018-10-11 PROCEDURE — G8988 SELF CARE GOAL STATUS: HCPCS | Mod: CI

## 2018-10-11 PROCEDURE — 99217 PR OBSERVATION CARE DISCHARGE: CPT | Mod: GC,,, | Performed by: HOSPITALIST

## 2018-10-11 PROCEDURE — 63600175 PHARM REV CODE 636 W HCPCS: Performed by: NURSE PRACTITIONER

## 2018-10-11 PROCEDURE — 97530 THERAPEUTIC ACTIVITIES: CPT

## 2018-10-11 RX ADMIN — FUROSEMIDE 40 MG: 40 TABLET ORAL at 08:10

## 2018-10-11 RX ADMIN — DILTIAZEM HYDROCHLORIDE 240 MG: 240 CAPSULE, COATED, EXTENDED RELEASE ORAL at 08:10

## 2018-10-11 RX ADMIN — TRAMADOL HYDROCHLORIDE 50 MG: 50 TABLET, FILM COATED ORAL at 09:10

## 2018-10-11 RX ADMIN — INSULIN ASPART 12 UNITS: 100 INJECTION, SOLUTION INTRAVENOUS; SUBCUTANEOUS at 05:10

## 2018-10-11 RX ADMIN — LOSARTAN POTASSIUM 100 MG: 50 TABLET ORAL at 08:10

## 2018-10-11 RX ADMIN — ENOXAPARIN SODIUM 40 MG: 100 INJECTION SUBCUTANEOUS at 05:10

## 2018-10-11 RX ADMIN — PANTOPRAZOLE SODIUM 40 MG: 40 TABLET, DELAYED RELEASE ORAL at 08:10

## 2018-10-11 RX ADMIN — TRAMADOL HYDROCHLORIDE 50 MG: 50 TABLET, FILM COATED ORAL at 08:10

## 2018-10-11 RX ADMIN — HYDRALAZINE HYDROCHLORIDE 25 MG: 25 TABLET ORAL at 01:10

## 2018-10-11 RX ADMIN — TRAMADOL HYDROCHLORIDE 50 MG: 50 TABLET, FILM COATED ORAL at 02:10

## 2018-10-11 RX ADMIN — CLONIDINE HYDROCHLORIDE 0.3 MG: 0.2 TABLET ORAL at 09:10

## 2018-10-11 RX ADMIN — RAMELTEON 8 MG: 8 TABLET, FILM COATED ORAL at 09:10

## 2018-10-11 RX ADMIN — MEGESTROL ACETATE 40 MG: 40 TABLET ORAL at 09:10

## 2018-10-11 RX ADMIN — PREDNISONE 10 MG: 10 TABLET ORAL at 08:10

## 2018-10-11 RX ADMIN — INSULIN DETEMIR 28 UNITS: 100 INJECTION, SOLUTION SUBCUTANEOUS at 08:10

## 2018-10-11 RX ADMIN — SENNOSIDES AND DOCUSATE SODIUM 1 TABLET: 8.6; 5 TABLET ORAL at 09:10

## 2018-10-11 RX ADMIN — FERROUS SULFATE TAB EC 325 MG (65 MG FE EQUIVALENT) 325 MG: 325 (65 FE) TABLET DELAYED RESPONSE at 08:10

## 2018-10-11 RX ADMIN — CLONIDINE HYDROCHLORIDE 0.3 MG: 0.2 TABLET ORAL at 08:10

## 2018-10-11 RX ADMIN — ACETAMINOPHEN 650 MG: 325 TABLET, FILM COATED ORAL at 01:10

## 2018-10-11 RX ADMIN — MEGESTROL ACETATE 40 MG: 40 TABLET ORAL at 08:10

## 2018-10-11 RX ADMIN — HYDRALAZINE HYDROCHLORIDE 25 MG: 25 TABLET ORAL at 09:10

## 2018-10-11 RX ADMIN — INSULIN ASPART 12 UNITS: 100 INJECTION, SOLUTION INTRAVENOUS; SUBCUTANEOUS at 01:10

## 2018-10-11 RX ADMIN — CLOPIDOGREL 75 MG: 75 TABLET, FILM COATED ORAL at 08:10

## 2018-10-11 RX ADMIN — SIMVASTATIN 20 MG: 20 TABLET, FILM COATED ORAL at 09:10

## 2018-10-11 RX ADMIN — ASPIRIN 81 MG: 81 TABLET, COATED ORAL at 08:10

## 2018-10-11 RX ADMIN — SENNOSIDES AND DOCUSATE SODIUM 1 TABLET: 8.6; 5 TABLET ORAL at 08:10

## 2018-10-11 RX ADMIN — DULOXETINE HYDROCHLORIDE 60 MG: 30 CAPSULE, DELAYED RELEASE ORAL at 08:10

## 2018-10-11 RX ADMIN — HYDRALAZINE HYDROCHLORIDE 25 MG: 25 TABLET ORAL at 05:10

## 2018-10-11 RX ADMIN — INSULIN ASPART 5 UNITS: 100 INJECTION, SOLUTION INTRAVENOUS; SUBCUTANEOUS at 05:10

## 2018-10-11 RX ADMIN — CLONIDINE HYDROCHLORIDE 0.3 MG: 0.2 TABLET ORAL at 02:10

## 2018-10-11 RX ADMIN — FLUTICASONE FUROATE AND VILANTEROL TRIFENATATE 1 PUFF: 100; 25 POWDER RESPIRATORY (INHALATION) at 08:10

## 2018-10-11 RX ADMIN — ACETAMINOPHEN 650 MG: 325 TABLET, FILM COATED ORAL at 05:10

## 2018-10-11 RX ADMIN — ACETAMINOPHEN 650 MG: 325 TABLET, FILM COATED ORAL at 09:10

## 2018-10-11 NOTE — PLAN OF CARE
Problem: Occupational Therapy Goal  Goal: Occupational Therapy Goal  Goals to be met by: 10/18/18     Patient will increase functional independence with ADLs by performing:    UE Dressing with Set-up Assistance and Supervision.  LE Dressing with Minimal Assistance, with modification/AE prn  Grooming while seated with Supervision. Met 10/8  Toileting from bedside commode with Moderate Assistance for hygiene and clothing management.   Toilet transfer to bedside commode with Minimal Assistance.      Outcome: Ongoing (interventions implemented as appropriate)  Pt continues to work well w/ therapy towards functional outcomes    Comments: Cont OT POC

## 2018-10-11 NOTE — PLAN OF CARE
Problem: Fall Risk (Pediatric)  Goal: Identify Related Risk Factors and Signs and Symptoms  Related risk factors and signs and symptoms are identified upon initiation of Human Response Clinical Practice Guideline (CPG)  Outcome: Ongoing (interventions implemented as appropriate)  Pt will remain free of falls during stay.

## 2018-10-11 NOTE — NURSING
Dr. Trejo with [facility] notified of pt CBG of 426. Sliding scale coverage given and stat glucose ordered. No new orders given at this time.

## 2018-10-11 NOTE — NURSING
Dr. Pizano given pt daughter number due to daughter wanting to speak to team about discharge. Daughter is at work and will be coming to  patient after work.

## 2018-10-11 NOTE — PLAN OF CARE
Problem: Physical Therapy Goal  Goal: Physical Therapy Goal  Goals to be met by: 10/18/18     Patient will increase functional independence with mobility by performing (all while maintaining NWB RLE):    1. Supine to sit with MInimal Assistance  2. Sit to supine with MInimal Assistance  3. Sit to stand transfer with Maximum Assistance met  4. Bed to chair transfer with Maximum Assistance using Rolling Walker  5. Gait  x 5 feet with Moderate Assistance using Rolling Walker.   6. Stand for 1 minutes with Minimal Assistance using Rolling Walker     Patient goals remain appropriate.  Patient will continue to benefit from further PT services.  Geo Alexandre, PTA

## 2018-10-11 NOTE — PLAN OF CARE
[facility]    HOME HEALTH ORDERS  FACE TO FACE ENCOUNTER    Patient Name: Sheryl Martines  YOB: 1955    PCP: Primary Doctor No   PCP Address: None  PCP Phone Number: None  PCP Fax: None    Encounter Date: 10/11/2018    Admit to Home Health    Diagnoses:  Active Hospital Problems    Diagnosis  POA    *Closed fracture of right tibia and fibula [S82.201A, S82.401A]  Yes    Fall [W19.XXXA]  Yes    Anemia [D64.9]  Yes    COPD (chronic obstructive pulmonary disease) [J44.9]  Yes    Essential hypertension [I10]  Yes    Moderate malnutrition [E44.0]  Yes     Moderate Malnutrition in the context of Acute Illness/Injury    Related to (etiology):  Decreased intake 2/2 nausea/vomiting and s/p appendectomy    Signs and Symptoms (as evidenced by):  Energy Intake: <75% of estimated energy requirement for >1 week  Muscle Mass Depletion: moderate depletion of temples, clavicle region and interosseous muscle   Weight Loss: 11.5% x 1 week    Interventions/Recommendations (treatment strategy):  Encourage po; continue 2000 diabetic; cardiac diet; Boost breeze TID; maintain/gain wt    Nutrition Diagnosis Status:  New      CAD (coronary artery disease) [I25.10]  Yes    Insulin dependent diabetes mellitus [E11.9, Z79.4]  Not Applicable      Resolved Hospital Problems   No resolved problems to display.       Future Appointments   Date Time Provider Department Center   10/15/2018  8:00 AM Sarah Lujan PA-C [facility]     Follow-up Information     Schedule an appointment as soon as possible for a visit with [facility] ORTHOPEDICS.    Specialty:  Orthopedics  Why:  Follow up for tibial/fibular fracture  Contact information:  [address]  [facility] [address]  [phone]                   I have seen and examined this patient face to face today. My clinical findings that support the need for the home health skilled services and home bound status are the  following:  Weakness/numbness causing balance and gait disturbance due to Weakness/Debility making it taxing to leave home.  Requiring assistive device to leave home due to unsteady gait caused by  Fracture.  Patient with medication mismanagement issues requiring home bound status as evidenced by  Unstable vital signs (blood pressure, heart rate) and Poor understanding of medication regimen/dosage.    Allergies:  Review of patient's allergies indicates:   Allergen Reactions    Ace inhibitors Swelling    Corticosteroids (glucocorticoids)     Hydralazine analogues     Tetracyclines Swelling    Travatan (with benzalkonium) [travoprost (benzalkonium)]        Diet: diabetic diet: 2000 calorie    Activities: non-weight bearing on right leg     Nursing:   SN to complete comprehensive assessment including routine vital signs. Instruct on disease process and s/s of complications to report to MD. Review/verify medication list sent home with the patient at time of discharge  and instruct patient/caregiver as needed. Frequency may be adjusted depending on start of care date.    Notify MD if SBP > 160 or < 90; DBP > 90 or < 50; HR > 120 or < 50; Temp > 101;       CONSULTS:    Physical Therapy to evaluate and treat. Evaluate for home safety and equipment needs; Establish/upgrade home exercise program. Perform / instruct on therapeutic exercises, gait training, transfer training, and Range of Motion.  Occupational Therapy to evaluate and treat. Evaluate home environment for safety and equipment needs. Perform/Instruct on transfers, ADL training, ROM, and therapeutic exercises.   to evaluate for community resources/long-range planning.      WOUND CARE ORDERS  n/a      Medications: Review discharge medications with patient and family and provide education.      Current Discharge Medication List      START taking these medications    Details   ferrous sulfate 325 (65 FE) MG EC tablet Take 1 tablet (325 mg total) by  mouth once daily.  Refills: 0      !! insulin aspart U-100 (NOVOLOG) 100 unit/mL InPn pen Inject 0-5 Units into the skin 4 (four) times daily before meals and nightly.  Qty: 5 Box, Refills: 10    Comments: Can adjust to equivalent product to meet patient's insurance      !! insulin aspart U-100 (NOVOLOG) 100 unit/mL InPn pen Inject 12 Units into the skin 3 (three) times daily.  Qty: 5 Box, Refills: 10       !! - Potential duplicate medications found. Please discuss with provider.      CONTINUE these medications which have CHANGED    Details   insulin detemir U-100 (LEVEMIR FLEXTOUCH) 100 unit/mL (3 mL) SubQ InPn pen Inject 28 Units into the skin once daily.  Qty: 5 Box, Refills: 10         CONTINUE these medications which have NOT CHANGED    Details   acetaminophen (TYLENOL) 325 MG tablet Take 2 tablets (650 mg total) by mouth every 6 (six) hours as needed for Pain.      albuterol-ipratropium (DUO-NEB) 2.5 mg-0.5 mg/3 mL nebulizer solution Take 3 mLs by nebulization every 4 (four) hours. Rescue      aspirin (ECOTRIN) 81 MG EC tablet Take 1 tablet (81 mg total) by mouth once daily.  Qty: 30 tablet, Refills: 11      cloNIDine (CATAPRES) 0.3 MG tablet Take 1 tablet (0.3 mg total) by mouth 3 (three) times daily.  Qty: 90 tablet, Refills: 3      clopidogrel (PLAVIX) 75 mg tablet Take 75 mg by mouth once daily.      diltiaZEM (CARDIZEM CD) 240 MG 24 hr capsule Take 1 capsule (240 mg total) by mouth once daily.  Qty: 30 capsule, Refills: 11      DULoxetine (CYMBALTA) 60 MG capsule Take 60 mg by mouth once daily.      fluticasone-vilanterol (BREO) 100-25 mcg/dose diskus inhaler Inhale 1 puff into the lungs once daily. Controller  Qty: 60 each, Refills: 3      furosemide (LASIX) 40 MG tablet Take 40 mg by mouth once daily.       hydrALAZINE (APRESOLINE) 25 MG tablet Take 1 tablet (25 mg total) by mouth every 8 (eight) hours.  Qty: 90 tablet, Refills: 3      Lactobacillus acidophilus 1 billion cell Cap Take 1 tablet by mouth  2 (two) times daily.      losartan (COZAAR) 100 MG tablet Take 100 mg by mouth once daily.       megestrol (MEGACE) 40 MG Tab Take 40 mg by mouth 2 (two) times daily.      ondansetron (ZOFRAN) 4 MG tablet Take 1 tablet (4 mg total) by mouth every 6 (six) hours as needed.  Qty: 30 tablet, Refills: 1      pantoprazole (PROTONIX) 40 MG tablet Take 40 mg by mouth once daily.      predniSONE (DELTASONE) 10 MG tablet Take 10 mg by mouth once daily.       pyridoxine, vitamin B6, (VITAMIN B-6) 50 MG Tab Take 1 tablet (50 mg total) by mouth once daily.  Refills: 0      senna-docusate 8.6-50 mg (SENNA WITH DOCUSATE SODIUM) 8.6-50 mg per tablet Take 1 tablet by mouth 2 (two) times daily.      simethicone 80 mg Tab Take 160 mg by mouth every 6 (six) hours as needed for Flatulence.      simvastatin (ZOCOR) 40 MG tablet Take 0.5 tablets (20 mg total) by mouth every evening.  Qty: 30 tablet, Refills: 3         STOP taking these medications       insulin lispro (HUMALOG KWIKPEN) 100 unit/mL InPn pen Comments:   Reason for Stopping:         traMADol (ULTRAM) 50 mg tablet Comments:   Reason for Stopping:               I certify that this patient is confined to her home and needs intermittent skilled nursing care, physical therapy and occupational therapy.

## 2018-10-11 NOTE — CARE UPDATE
Patients family feels more comfortable taking patient home once home health DME has been delivered tomorrow. Patient will stay overnight and be discharged once wheel chair and other DME are delivered in AM.             Mara Pizano MD  Resident Physician - PGY2

## 2018-10-11 NOTE — PT/OT/SLP PROGRESS
Physical Therapy Treatment    Patient Name:  Sheryl Martines   MRN:  [id number]    Recommendations:     Discharge Recommendations:  nursing facility, skilled   Discharge Equipment Recommendations: wheelchair ( with elevated foot rest and drop arms), slide board, walker, rolling, Bedside commode(with drop arms)  Barriers to discharge: Inaccessible home and Decreased caregiver support    Assessment:     Sheryl Martines is a 63 y.o. female admitted with a medical diagnosis of Closed fracture of right tibia and fibula.  She presents with the following impairments/functional limitations:  weakness, impaired endurance, impaired self care skills, impaired functional mobilty, gait instability, impaired balance, pain, decreased ROM, orthopedic precautions, decreased lower extremity function .Mrs. Martines required less assistance with mobility and transfers. She still requires assistance with all mobility and transfers.  Noted increase fatigue with mobility and required frequent breaks.  Patient would benefit from SNF placement.    Rehab Prognosis:  Fair ; patient would benefit from acute skilled PT services to address these deficits and reach maximum level of function.      Recent Surgery: * No surgery found *      Plan:     During this hospitalization, patient to be seen 4 x/week to address the above listed problems via gait training, therapeutic activities, therapeutic exercises  · Plan of Care Expires:  11/03/18   Plan of Care Reviewed with: patient, daughter    Subjective     Communicated with NSG prior to session.  Patient found Supine upon PT entry to room, agreeable to treatment.      Chief Complaint: R LE pain  Patient comments/goals: patient stated she was nervous with mobility and complained of R LE pain. Mrs. Martines's Daughter stated they couldn't afford to go to SNF and was probably going home with HH.     Pain/Comfort:  · Pain Rating 1: 7/10  · Location - Side 1: Right  · Location 1: leg    Patients  cultural, spiritual, Rastafari conflicts given the current situation: none    Objective:     Patient found with: telemetry     General Precautions: Standard, fall   Orthopedic Precautions:RLE non weight bearing   Braces:       Functional Mobility:  · Bed Mobility:     · Supine to Sit: minimum assistance using HR  · Transfers:     · Sit to Stand:  moderate assistance with RW from BS chair x3 trials  · Bed to Chair: SPT from bed to BS chair with no AD mod assistance  ·                        SPT from BS chair to w/c no AD mod assistance  · Toilet Transfer: SB transfers from w/c <> BS commode min/mod assistance.  VC's for technique and safety  · Patient's  Daughter observed and was instructed on proper technique with SB transfer  · Gait: Patient unable at this time  · Balance: Noted good sitting and poor standing balance      AM-PAC 6 CLICK MOBILITY  Turning over in bed (including adjusting bedclothes, sheets and blankets)?: 3  Sitting down on and standing up from a chair with arms (e.g., wheelchair, bedside commode, etc.): 2  Moving from lying on back to sitting on the side of the bed?: 3  Moving to and from a bed to a chair (including a wheelchair)?: 2  Need to walk in hospital room?: 1  Climbing 3-5 steps with a railing?: 1  Basic Mobility Total Score: 12       Therapeutic Activities and Exercises:   Patient performed supine exercise QS,GS,hip ABD/ADD( with assistance) and SLR( with assistance)x20 reps  Patient and patient's Daughter educated on SB transfers  Patient tolerated static standing with RW min assistance 10-15sec x3 trials.      Patient left up in chair with all lines intact, call button in reach and Daughter present..    GOALS:   Multidisciplinary Problems     Physical Therapy Goals        Problem: Physical Therapy Goal    Goal Priority Disciplines Outcome Goal Variances Interventions   Physical Therapy Goal     PT, PT/OT Ongoing (interventions implemented as appropriate)     Description:  Goals to be  met by: 10/18/18     Patient will increase functional independence with mobility by performing (all while maintaining NWB RLE):    1. Supine to sit with MInimal Assistance  2. Sit to supine with MInimal Assistance  3. Sit to stand transfer with Maximum Assistance  4. Bed to chair transfer with Maximum Assistance using Rolling Walker  5. Gait  x 5 feet with Moderate Assistance using Rolling Walker.   6. Stand for 1 minutes with Minimal Assistance using Rolling Walker                      Time Tracking:     PT Received On: 10/11/18  PT Start Time: 0915     PT Stop Time: 1000  PT Total Time (min): 45 min     Billable Minutes: Therapeutic Activity 35 and Therapeutic Exercise 10    Treatment Type: Treatment  PT/PTA: PTA     PTA Visit Number: 2     Geo Alexandre II, PTA  10/11/2018

## 2018-10-11 NOTE — ASSESSMENT & PLAN NOTE
24U detemir + 4U aspart with meals on prior discharge  Currently increased to 28U basal + 12U prandial + SSI  - On 10mg pred od for asthma   - Continue to monitor BGL and titrate insulin as needed    Blood Sugars (AccuCheck):  Recent Labs      10/10/18   0920  10/10/18   1304  10/10/18   1816  10/10/18   2158  10/11/18   0843  10/11/18   1246   POCTGLUCOSE  97  251*  129*  115*  76  178*

## 2018-10-11 NOTE — DISCHARGE SUMMARY
[facility] Hospital Medicine  Discharge Summary      Patient Name: Sheryl Martines  MRN: [id number]  Admission Date: 10/3/2018  Hospital Length of Stay: 0 days  Discharge Date and Time:  10/11/2018 4:19 PM  Attending Physician: Servando García, *   Discharging Provider: Mara Pizano MD  Primary Care Provider: Primary Doctor [facility] Medicine Team: [facility] HOSP MED 1 Mara Pizano MD    HPI:   64 y/o female, who presents to the ED with R knee pain after fall from standing height.  She has a PMH of CVA, COPD, CAD, HTN, DM, HLD, and asthma.  She states that she was walking to her front step when her knees buckled, and she fell landing on her knees.  She reports using a walker at baseline to assist with ambulation.  She denies LOC or head trauma.  She was in substantial pain while in ED and received 5mg oxycodone IR, 15mg morphine PO, and 6mg morphine IM.  Imaging reveals nondisplaced right proximal tibial / fibular fractures.  She was evaluated by orthopedics while in ED and a splint was applied.     Recently admitted from SNF on 09/23/18 for AMS r/t possible Co2 narcosis, and was discharged home with home health on 10/2/2018.     * No surgery found *      Hospital Course:   Pt D/C home 2 days ago after prolonged stay in SNF and hospital for open appy complicated by infection and CO2 narcosis associated with AMS. Pt was discharged mentating and baseline and without signs of infection. It was recommended she d/c to SNF for rehab and additional care due to her deconditioning but pt was adamant about wanting to return home. Pt knees gave out while ambulating outside her home and collapsed to knees and then falling on her side, resulting in closed, non-displaced proximal fracture of the R tibia and fibula    Knee xray and CT knee demonstrate non-displaced fractures of the proximal tibia and fibula. For non-operative management per ortho. Leg splinted in ED. Other than her new  fracture her medical condition is as per recent hospital d/c without any new issues or medical deterioration. Patient wishes to stay in [place] with daughter, but her insurer will only cover SNF placement in [place]. Due to this the pt and daughter have decided for her to go back home to the daughter's residence despite recommendations of SNF due to pt at very high risk for having another fall secondary to her deconditioning complicated now by tibial/fibular fracture. In spite of this due to the patient's financial/insurer situation they have decided this is the best option for them.      Consults:   Consults (From admission, onward)        Status Ordering Provider     Inpatient consult to Orthopedic Surgery  Once     Provider:  (Not yet assigned)    Completed FIONA RODNEY     Inpatient consult to Registered Dietitian/Nutritionist  Once     Provider:  (Not yet assigned)    Completed CIARA MCKEON     Inpatient consult to [facility]  Once     Provider:  (Not yet assigned)    Completed CAS CORRALES     Inpatient consult to [facility]  Once     Provider:  (Not yet assigned)    Acknowledged CIARA MCKEON          * Closed fracture of right tibia and fibula    Fall  - presents after fall from standing height following d/c home 2 days ago - no LOC or head trauma  - utilizes walker at baseline to assist with ambulation   - splint applied to RLE  - NWB to RLE  - PT/OT  - case management to assist with discharge planning   - PRN pain management   - CT R knee confirms closed non-displaced fx of proximal tibia and fibula  - non-operative management per Ortho, splint in place  - Pt originally pending SNF as has proven dangerous to remain at home despite her preference due to deconditioning from previous hospital course in addition to new fracture. However, patient wishes to stay in [place] with daughter, but her insurer will only cover SNF placement in [place]. Due to this the pt and daughter  have decided for her to go back home to the daughter's residence despite recommendations of SNF as pt at very high risk for having another fall secondary to her deconditioning complicated now by tibial/fibular fracture occurring at previous attempted discharge. In spite of this due to the patient's financial/insurer situation they have decided this is the best option for them.         COPD (chronic obstructive pulmonary disease)    - denies SOB / wheezing  - in NAD  - resume home medication regimen  - supplemental oxygen as needed to maintain saturations > 92%  - Fluticasone/vilanterol 1 puff daily  - duo nebs PRN SOB / Wheezing   - Encourage CPAP at night especially on opioids for PRN pain, pt is reluctant though. Previous CO2 narcosis on admit with AMS requiring bipap    Consider CPAP at home if continued issues with CO2 retention / STELLA        Anemia    - Hgb 8.9 on arrival (last known baseline 9.2)  - iron supplementation initiated         Moderate malnutrition    - most recent albumin 2.5 (10/01)  - prealbumin 20 on admit (borderline low)  - previous nutrition rec's 09/21/18 encourage po intake, 2000 diabetic / cardiac, Boost breeze TID        Essential hypertension    - Few occasions of SBP ranging between 130-181 since re-admit  - possibly elevated d/t discomfort  - resume home antihypertensive regimen   - monitor and adjust therapy as indicated if persistently elevated        CAD (coronary artery disease)    - denies CP / SOB  - EKG without obvious acute ischemic changes  - continue ASA and statin   - trend electrolytes and replete as indicated         Insulin dependent diabetes mellitus    24U detemir + 4U aspart with meals on prior discharge  Currently increased to 28U basal + 12U prandial + SSI  - On 10mg pred od for asthma   - Continue to monitor BGL and titrate insulin as needed    Blood Sugars (AccuCheck):  Recent Labs      10/10/18   0920  10/10/18   1304  10/10/18   1816  10/10/18   2158  10/11/18    "0843  10/11/18   1246   POCTGLUCOSE  97  251*  129*  115*  76  178*                   Final Active Diagnoses:    Diagnosis Date Noted POA    PRINCIPAL PROBLEM:  Closed fracture of right tibia and fibula [S82.201A, S82.401A] 10/03/2018 Yes    Fall [W19.XXXA] 10/04/2018 Yes    Anemia [D64.9] 10/04/2018 Yes    COPD (chronic obstructive pulmonary disease) [J44.9] 10/04/2018 Yes    Essential hypertension [I10] 09/21/2018 Yes    Moderate malnutrition [E44.0] 09/21/2018 Yes    CAD (coronary artery disease) [I25.10] 08/23/2018 Yes    Insulin dependent diabetes mellitus [E11.9, Z79.4] 08/23/2018 Not Applicable      Problems Resolved During this Admission:       Discharged Condition: good    Disposition: Home or Self Care    Follow Up:  Follow-up Information     Schedule an appointment as soon as possible for a visit with [facility].    Specialty:  Orthopedics  Why:  Follow up for tibial/fibular fracture  Contact information:  [address] [place] [id number]  [phone]               Patient Instructions:      WHEELCHAIR FOR HOME USE     Order Specific Question Answer Comments   Hours in W/C per day: 18    Type of Wheelchair: Standard    Size(Width): 18"(STD adult)    Leg Support: STD footrests    Leg Support: Elevating leg rests    Lap Belt: Velcro    Cushion: Basic    Height: 5' (1.524 m)    Weight: 74.9 kg (165 lb 2 oz)    Does patient have medical equipment at home? bedside commode    Does patient have medical equipment at home? shower chair    Does patient have medical equipment at home? rollator    Length of need (1-99 months): 99    Please check all that apply: Caregiver is capable and willing to operate wheelchair safely.      WALKER FOR HOME USE     Order Specific Question Answer Comments   Type of Walker: Adult (5'4"-6'6")    With wheels? Yes    Height: 5' (1.524 m)    Weight: 74.9 kg (165 lb 2 oz)    Length of need (1-99 months): 99    Does patient have medical equipment at home? " bedside commode    Does patient have medical equipment at home? shower chair    Does patient have medical equipment at home? rollator    Please check all that apply: Patient's condition impairs ambulation.      3 IN 1 COMMODE FOR HOME USE     Order Specific Question Answer Comments   Type: Standard    Height: 5' (1.524 m)    Weight: 74.9 kg (165 lb 2 oz)    Does patient have medical equipment at home? bedside commode    Does patient have medical equipment at home? shower chair    Does patient have medical equipment at home? rollator    Length of need (1-99 months): 99      Ambulatory Referral to Orthopedics   Referral Priority: Routine Referral Type: Consultation   Referral Reason: Specialty Services Required   Requested Specialty: Orthopedic Surgery   Number of Visits Requested: 1       Significant Diagnostic Studies: Labs: All labs within the past 24 hours have been reviewed    Pending Diagnostic Studies:     None         Medications:  Reconciled Home Medications:      Medication List      START taking these medications    ferrous sulfate 325 (65 FE) MG EC tablet  Take 1 tablet (325 mg total) by mouth once daily.     * insulin aspart U-100 100 unit/mL Inpn pen  Commonly known as:  NovoLOG  Inject 0-5 Units into the skin 4 (four) times daily before meals and nightly.     * insulin aspart U-100 100 unit/mL Inpn pen  Commonly known as:  NovoLOG  Inject 12 Units into the skin 3 (three) times daily.         * This list has 2 medication(s) that are the same as other medications prescribed for you. Read the directions carefully, and ask your doctor or other care provider to review them with you.            CHANGE how you take these medications    insulin detemir U-100 100 unit/mL (3 mL) Inpn pen  Commonly known as:  LEVEMIR FLEXTOUCH  Inject 28 Units into the skin once daily.  What changed:  how much to take        CONTINUE taking these medications    acetaminophen 325 MG tablet  Commonly known as:  TYLENOL  Take 2  tablets (650 mg total) by mouth every 6 (six) hours as needed for Pain.     albuterol-ipratropium 2.5 mg-0.5 mg/3 mL nebulizer solution  Commonly known as:  DUO-NEB  Take 3 mLs by nebulization every 4 (four) hours. Rescue     aspirin 81 MG EC tablet  Commonly known as:  ECOTRIN  Take 1 tablet (81 mg total) by mouth once daily.     cloNIDine 0.3 MG tablet  Commonly known as:  CATAPRES  Take 1 tablet (0.3 mg total) by mouth 3 (three) times daily.     clopidogrel 75 mg tablet  Commonly known as:  PLAVIX  Take 75 mg by mouth once daily.     COZAAR 100 MG tablet  Generic drug:  losartan  Take 100 mg by mouth once daily.     diltiaZEM 240 MG 24 hr capsule  Commonly known as:  CARDIZEM CD  Take 1 capsule (240 mg total) by mouth once daily.     DULoxetine 60 MG capsule  Commonly known as:  CYMBALTA  Take 60 mg by mouth once daily.     fluticasone-vilanterol 100-25 mcg/dose diskus inhaler  Commonly known as:  BREO  Inhale 1 puff into the lungs once daily. Controller     furosemide 40 MG tablet  Commonly known as:  LASIX  Take 40 mg by mouth once daily.     hydrALAZINE 25 MG tablet  Commonly known as:  APRESOLINE  Take 1 tablet (25 mg total) by mouth every 8 (eight) hours.     Lactobacillus acidophilus 1 billion cell Cap  Take 1 tablet by mouth 2 (two) times daily.     megestrol 40 MG Tab  Commonly known as:  MEGACE  Take 40 mg by mouth 2 (two) times daily.     ondansetron 4 MG tablet  Commonly known as:  ZOFRAN  Take 1 tablet (4 mg total) by mouth every 6 (six) hours as needed.     pantoprazole 40 MG tablet  Commonly known as:  PROTONIX  Take 40 mg by mouth once daily.     predniSONE 10 MG tablet  Commonly known as:  DELTASONE  Take 10 mg by mouth once daily.     pyridoxine (vitamin B6) 50 MG Tab  Commonly known as:  VITAMIN B-6  Take 1 tablet (50 mg total) by mouth once daily.     SENNA WITH DOCUSATE SODIUM 8.6-50 mg per tablet  Generic drug:  senna-docusate 8.6-50 mg  Take 1 tablet by mouth 2 (two) times daily.      simethicone 80 mg Tab  Take 160 mg by mouth every 6 (six) hours as needed for Flatulence.     simvastatin 40 MG tablet  Commonly known as:  ZOCOR  Take 0.5 tablets (20 mg total) by mouth every evening.        STOP taking these medications    insulin lispro 100 unit/mL Inpn pen  Commonly known as:  HUMALOG KWIKPEN     traMADol 50 mg tablet  Commonly known as:  ULTRAM            Indwelling Lines/Drains at time of discharge:   Lines/Drains/Airways     Central Venous Catheter Line                 Port A Cath Single Lumen right subclavian -- days         Port A Cath Single Lumen 10/04/18 0302 right subclavian 7 days          Pressure Ulcer                 Pressure Injury 09/11/18 1515 medial Sacral spine Stage 2 30 days                Time spent on the discharge of patient: 45 minutes  Patient was seen and examined on the date of discharge and determined to be suitable for discharge.     Plan discussed with attending Dr. Servando García, * , further recommendations as per attending addendum. Please feel free to call with any questions or concerns.          Mara Pizano MD  Department of Hospital Medicine  [facility]

## 2018-10-12 VITALS
HEART RATE: 66 BPM | HEIGHT: 60 IN | DIASTOLIC BLOOD PRESSURE: 60 MMHG | RESPIRATION RATE: 20 BRPM | TEMPERATURE: 99 F | WEIGHT: 165.13 LBS | BODY MASS INDEX: 32.42 KG/M2 | SYSTOLIC BLOOD PRESSURE: 120 MMHG | OXYGEN SATURATION: 98 %

## 2018-10-12 LAB
ANION GAP SERPL CALC-SCNC: 8 MMOL/L
BASOPHILS # BLD AUTO: 0.05 K/UL
BASOPHILS NFR BLD: 0.6 %
BUN SERPL-MCNC: 20 MG/DL
CALCIUM SERPL-MCNC: 8.9 MG/DL
CHLORIDE SERPL-SCNC: 103 MMOL/L
CO2 SERPL-SCNC: 26 MMOL/L
CREAT SERPL-MCNC: 0.9 MG/DL
DIFFERENTIAL METHOD: ABNORMAL
EOSINOPHIL # BLD AUTO: 0.1 K/UL
EOSINOPHIL NFR BLD: 1 %
ERYTHROCYTE [DISTWIDTH] IN BLOOD BY AUTOMATED COUNT: 19.3 %
EST. GFR  (AFRICAN AMERICAN): >60 ML/MIN/1.73 M^2
EST. GFR  (NON AFRICAN AMERICAN): >60 ML/MIN/1.73 M^2
GLUCOSE SERPL-MCNC: 132 MG/DL
HCT VFR BLD AUTO: 26.3 %
HGB BLD-MCNC: 7.8 G/DL
IMM GRANULOCYTES # BLD AUTO: 0.04 K/UL
IMM GRANULOCYTES NFR BLD AUTO: 0.5 %
LYMPHOCYTES # BLD AUTO: 3.1 K/UL
LYMPHOCYTES NFR BLD: 35.3 %
MCH RBC QN AUTO: 26.4 PG
MCHC RBC AUTO-ENTMCNC: 29.7 G/DL
MCV RBC AUTO: 89 FL
MONOCYTES # BLD AUTO: 0.6 K/UL
MONOCYTES NFR BLD: 6.4 %
NEUTROPHILS # BLD AUTO: 5 K/UL
NEUTROPHILS NFR BLD: 56.2 %
NRBC BLD-RTO: 0 /100 WBC
PLATELET # BLD AUTO: 286 K/UL
PMV BLD AUTO: 9.6 FL
POCT GLUCOSE: 113 MG/DL (ref 70–110)
POCT GLUCOSE: 139 MG/DL (ref 70–110)
POCT GLUCOSE: 301 MG/DL (ref 70–110)
POTASSIUM SERPL-SCNC: 4.4 MMOL/L
RBC # BLD AUTO: 2.96 M/UL
SODIUM SERPL-SCNC: 137 MMOL/L
WBC # BLD AUTO: 8.81 K/UL

## 2018-10-12 PROCEDURE — 63600175 PHARM REV CODE 636 W HCPCS: Performed by: HOSPITALIST

## 2018-10-12 PROCEDURE — 63600175 PHARM REV CODE 636 W HCPCS: Performed by: STUDENT IN AN ORGANIZED HEALTH CARE EDUCATION/TRAINING PROGRAM

## 2018-10-12 PROCEDURE — 99217 PR OBSERVATION CARE DISCHARGE: CPT | Mod: GC,,, | Performed by: HOSPITALIST

## 2018-10-12 PROCEDURE — G8989 SELF CARE D/C STATUS: HCPCS | Mod: CK

## 2018-10-12 PROCEDURE — 25000003 PHARM REV CODE 250: Performed by: NURSE PRACTITIONER

## 2018-10-12 PROCEDURE — 25000003 PHARM REV CODE 250: Performed by: STUDENT IN AN ORGANIZED HEALTH CARE EDUCATION/TRAINING PROGRAM

## 2018-10-12 PROCEDURE — 85025 COMPLETE CBC W/AUTO DIFF WBC: CPT

## 2018-10-12 PROCEDURE — 80048 BASIC METABOLIC PNL TOTAL CA: CPT

## 2018-10-12 PROCEDURE — G0378 HOSPITAL OBSERVATION PER HR: HCPCS

## 2018-10-12 PROCEDURE — 36415 COLL VENOUS BLD VENIPUNCTURE: CPT

## 2018-10-12 PROCEDURE — G8987 SELF CARE CURRENT STATUS: HCPCS | Mod: CK

## 2018-10-12 PROCEDURE — 63600175 PHARM REV CODE 636 W HCPCS: Performed by: NURSE PRACTITIONER

## 2018-10-12 RX ORDER — ACETAMINOPHEN 325 MG/1
650 TABLET ORAL EVERY 4 HOURS PRN
Status: DISCONTINUED | OUTPATIENT
Start: 2018-10-12 | End: 2018-10-12 | Stop reason: HOSPADM

## 2018-10-12 RX ORDER — TRAMADOL HYDROCHLORIDE 50 MG/1
50 TABLET ORAL EVERY 6 HOURS PRN
Qty: 28 TABLET | Refills: 0 | Status: SHIPPED | OUTPATIENT
Start: 2018-10-12 | End: 2018-10-19

## 2018-10-12 RX ORDER — HEPARIN 100 UNIT/ML
5 SYRINGE INTRAVENOUS
Status: DISCONTINUED | OUTPATIENT
Start: 2018-10-12 | End: 2018-10-12 | Stop reason: HOSPADM

## 2018-10-12 RX ORDER — HEPARIN SODIUM,PORCINE 10 UNIT/ML
10 VIAL (ML) INTRAVENOUS
Status: DISCONTINUED | OUTPATIENT
Start: 2018-10-12 | End: 2018-10-12

## 2018-10-12 RX ADMIN — TRAMADOL HYDROCHLORIDE 50 MG: 50 TABLET, FILM COATED ORAL at 02:10

## 2018-10-12 RX ADMIN — TRAMADOL HYDROCHLORIDE 50 MG: 50 TABLET, FILM COATED ORAL at 01:10

## 2018-10-12 RX ADMIN — HEPARIN 500 UNITS: 100 SYRINGE at 03:10

## 2018-10-12 RX ADMIN — FERROUS SULFATE TAB EC 325 MG (65 MG FE EQUIVALENT) 325 MG: 325 (65 FE) TABLET DELAYED RESPONSE at 09:10

## 2018-10-12 RX ADMIN — HYDRALAZINE HYDROCHLORIDE 25 MG: 25 TABLET ORAL at 05:10

## 2018-10-12 RX ADMIN — PREDNISONE 10 MG: 10 TABLET ORAL at 09:10

## 2018-10-12 RX ADMIN — FUROSEMIDE 40 MG: 40 TABLET ORAL at 09:10

## 2018-10-12 RX ADMIN — DULOXETINE HYDROCHLORIDE 60 MG: 30 CAPSULE, DELAYED RELEASE ORAL at 09:10

## 2018-10-12 RX ADMIN — CLOPIDOGREL 75 MG: 75 TABLET, FILM COATED ORAL at 09:10

## 2018-10-12 RX ADMIN — ACETAMINOPHEN 650 MG: 325 TABLET, FILM COATED ORAL at 05:10

## 2018-10-12 RX ADMIN — MEGESTROL ACETATE 40 MG: 40 TABLET ORAL at 09:10

## 2018-10-12 RX ADMIN — DILTIAZEM HYDROCHLORIDE 240 MG: 240 CAPSULE, COATED, EXTENDED RELEASE ORAL at 09:10

## 2018-10-12 RX ADMIN — INSULIN ASPART 12 UNITS: 100 INJECTION, SOLUTION INTRAVENOUS; SUBCUTANEOUS at 09:10

## 2018-10-12 RX ADMIN — ACETAMINOPHEN 650 MG: 325 TABLET ORAL at 10:10

## 2018-10-12 RX ADMIN — ASPIRIN 81 MG: 81 TABLET, COATED ORAL at 09:10

## 2018-10-12 RX ADMIN — PANTOPRAZOLE SODIUM 40 MG: 40 TABLET, DELAYED RELEASE ORAL at 09:10

## 2018-10-12 RX ADMIN — SENNOSIDES AND DOCUSATE SODIUM 1 TABLET: 8.6; 5 TABLET ORAL at 09:10

## 2018-10-12 RX ADMIN — INSULIN DETEMIR 28 UNITS: 100 INJECTION, SOLUTION SUBCUTANEOUS at 09:10

## 2018-10-12 NOTE — NURSING
Manual b/p 112/40, HR 80. Dr Sanchez stated to hold clonidine and losartan and recheck b/p in carlos 2 hours.

## 2018-10-12 NOTE — NURSING
Recheck blood pressure manually is 114/46. Dr Morris said to hold afternoon blood pressure medication.

## 2018-10-12 NOTE — DISCHARGE SUMMARY
[facility] Hospital Medicine  Discharge Summary      Patient Name: Sheryl Martines  MRN: [id number]  Admission Date: 10/3/2018  Hospital Length of Stay: 0 days  Discharge Date and Time:  10/12/2018 4:24 PM  Attending Physician: Servando García, *   Discharging Provider: Wayne Morris MD  Primary Care Provider: Primary Doctor [facility] Medicine Team: [facility] HOSP MED 1 Wayne Morris MD    HPI:   62 y/o female, who presents to the ED with R knee pain after fall from standing height.  She has a PMH of CVA, COPD, CAD, HTN, DM, HLD, and asthma.  She states that she was walking to her front step when her knees buckled, and she fell landing on her knees.  She reports using a walker at baseline to assist with ambulation.  She denies LOC or head trauma.  She was in substantial pain while in ED and received 5mg oxycodone IR, 15mg morphine PO, and 6mg morphine IM.  Imaging reveals nondisplaced right proximal tibial / fibular fractures.  She was evaluated by orthopedics while in ED and a splint was applied.     Recently admitted from SNF on 09/23/18 for AMS r/t possible Co2 narcosis, and was discharged home with home health on 10/2/2018.     * No surgery found *      Hospital Course:   Pt D/C home 2 days ago after prolonged stay in SNF and hospital for open appy complicated by infection and CO2 narcosis associated with AMS. Pt was discharged mentating and baseline and without signs of infection. It was recommended she d/c to SNF for rehab and additional care due to her deconditioning but pt was adamant about wanting to return home. Pt knees gave out while ambulating outside her home and collapsed to knees and then falling on her side, resulting in closed, non-displaced proximal fracture of the R tibia and fibula    Knee xray and CT knee demonstrate non-displaced fractures of the proximal tibia and fibula. For non-operative management per ortho. Leg splinted in ED. Other than her new fracture her medical  condition is as per recent hospital d/c without any new issues or medical deterioration. Patient wishes to stay in [place] with daughter, but her insurer will only cover SNF placement in [place]. Due to this the pt and daughter have decided for her to go back home to the daughter's residence despite recommendations of SNF due to pt at very high risk for having another fall secondary to her deconditioning complicated now by tibial/fibular fracture. In spite of this due to the patient's financial/insurer situation they have decided this is the best option for them.      Physical Exam   Constitutional: She is oriented to person, place, and time. She appears well-developed and well-nourished. No distress.   HENT:   Head: Normocephalic and atraumatic.   Eyes: Conjunctivae are normal. No scleral icterus.   Neck: Neck supple. No JVD present. No tracheal deviation present.   Cardiovascular: Normal rate, regular rhythm, normal heart sounds and intact distal pulses.   Pulmonary/Chest: Effort normal and breath sounds normal. No stridor. No respiratory distress. She has no wheezes. She has no rales. She exhibits no tenderness.   Abdominal: Soft. Bowel sounds are normal. She exhibits no mass. There is no tenderness. There is no rebound and no guarding.   Musculoskeletal: She exhibits tenderness. She exhibits no edema.   Brace on R leg, tender to articulation and movement   Neurological: She is alert and oriented to person, place, and time. No cranial nerve deficit or sensory deficit. Coordination normal.   Skin: Skin is warm and dry. She is not diaphoretic.   Psychiatric: She has a normal mood and affect. Her behavior is normal.       Consults:   Consults (From admission, onward)        Status Ordering Provider     Inpatient consult to Orthopedic Surgery  Once     Provider:  (Not yet assigned)    FIONA Hi     Inpatient consult to Registered Dietitian/Nutritionist  Once     Provider:  (Not yet assigned)     "Completed CIARA MCKEON     Inpatient consult to [facility]  Once     Provider:  (Not yet assigned)    Completed CAS CORRALES     Inpatient consult to SNF Nursing [place]  Once     Provider:  (Not yet assigned)    Acknowledged CIARA MCKEON          No new Assessment & Plan notes have been filed under this hospital service since the last note was generated.  Service: Hospital Medicine    Final Active Diagnoses:    Diagnosis Date Noted POA    PRINCIPAL PROBLEM:  Closed fracture of right tibia and fibula [S82.201A, S82.401A] 10/03/2018 Yes    Fall [W19.XXXA] 10/04/2018 Yes    Anemia [D64.9] 10/04/2018 Yes    COPD (chronic obstructive pulmonary disease) [J44.9] 10/04/2018 Yes    Essential hypertension [I10] 09/21/2018 Yes    Moderate malnutrition [E44.0] 09/21/2018 Yes    CAD (coronary artery disease) [I25.10] 08/23/2018 Yes    Insulin dependent diabetes mellitus [E11.9, Z79.4] 08/23/2018 Not Applicable      Problems Resolved During this Admission:       Discharged Condition: fair    Disposition: Home or Self Care    Follow Up:  Follow-up Information     Schedule an appointment as soon as possible for a visit with [facility] ORTHOPEDICS.    Specialty:  Orthopedics  Why:  Follow up for tibial/fibular fracture  Contact information:  [address] [place] [address] [id number] [phone]               Patient Instructions:      WHEELCHAIR FOR HOME USE     Order Specific Question Answer Comments   Hours in W/C per day: 18    Type of Wheelchair: Standard    Size(Width): 18"(STD adult)    Leg Support: STD footrests    Leg Support: Elevating leg rests    Lap Belt: Velcro    Cushion: Basic    Height: 5' (1.524 m)    Weight: 74.9 kg (165 lb 2 oz)    Does patient have medical equipment at home? bedside commode    Does patient have medical equipment at home? shower chair    Does patient have medical equipment at home? rollator    Length of need (1-99 months): 99    Please check all that apply: " "Caregiver is capable and willing to operate wheelchair safely.    Vendor: [facility]    Expected Date of Delivery: 10/12/2018      WALKER FOR HOME USE     Order Specific Question Answer Comments   Type of Walker: Adult (5'4"-6'6")    With wheels? Yes    Height: 5' (1.524 m)    Weight: 74.9 kg (165 lb 2 oz)    Length of need (1-99 months): 99    Does patient have medical equipment at home? bedside commode    Does patient have medical equipment at home? shower chair    Does patient have medical equipment at home? rollator    Please check all that apply: Patient's condition impairs ambulation.    Vendor: Other (use comments)    Expected Date of Delivery: 10/12/2018      3 IN 1 COMMODE FOR HOME USE     Order Specific Question Answer Comments   Type: Standard    Height: 5' (1.524 m)    Weight: 74.9 kg (165 lb 2 oz)    Does patient have medical equipment at home? bedside commode    Does patient have medical equipment at home? shower chair    Does patient have medical equipment at home? rollator    Length of need (1-99 months): 99    Vendor: Other (use comments)    Expected Date of Delivery: 10/12/2018      SLIDING BOARD FOR HOME USE     Order Specific Question Answer Comments   Height: 5' (1.524 m)    Weight: 74.9 kg (165 lb 2 oz)    Does patient have medical equipment at home? bedside commode    Does patient have medical equipment at home? shower chair    Does patient have medical equipment at home? rollator    Length of need (1-99 months): 12    Need for transfer? Yes    Vendor: [facility]    Expected Date of Delivery: 10/12/2018      Ambulatory Referral to Orthopedics   Referral Priority: Routine Referral Type: Consultation   Referral Reason: Specialty Services Required   Requested Specialty: Orthopedic Surgery   Number of Visits Requested: 1       Significant Diagnostic Studies: Labs:   CMP   Recent Labs   Lab  10/11/18   0416  10/11/18   1755  10/12/18   0604   NA  139   --   137   K  4.5   --   4.4   CL  103   --  "  103   CO2  27   --   26   GLU  69*  449*  132*   BUN  23   --   20   CREATININE  0.9   --   0.9   CALCIUM  9.0   --   8.9   ANIONGAP  9   --   8   ESTGFRAFRICA  >60.0   --   >60.0   EGFRNONAA  >60.0   --   >60.0   , CBC   Recent Labs   Lab  10/11/18   0416  10/12/18   0604   WBC  9.06  8.81   HGB  8.7*  7.8*   HCT  28.2*  26.3*   PLT  305  286    and All labs within the past 24 hours have been reviewed    Pending Diagnostic Studies:     None         Medications:  Reconciled Home Medications:      Medication List      START taking these medications    ferrous sulfate 325 (65 FE) MG EC tablet  Take 1 tablet (325 mg total) by mouth once daily.     * insulin aspart U-100 100 unit/mL Inpn pen  Commonly known as:  NovoLOG  Inject 0-5 Units into the skin 4 (four) times daily before meals and nightly.     * insulin aspart U-100 100 unit/mL Inpn pen  Commonly known as:  NovoLOG  Inject 12 Units into the skin 3 (three) times daily.         * This list has 2 medication(s) that are the same as other medications prescribed for you. Read the directions carefully, and ask your doctor or other care provider to review them with you.            CHANGE how you take these medications    insulin detemir U-100 100 unit/mL (3 mL) Inpn pen  Commonly known as:  LEVEMIR FLEXTOUCH  Inject 28 Units into the skin once daily.  What changed:  how much to take     traMADol 50 mg tablet  Commonly known as:  ULTRAM  Take 1 tablet (50 mg total) by mouth every 6 (six) hours as needed.  What changed:  See the new instructions.        CONTINUE taking these medications    acetaminophen 325 MG tablet  Commonly known as:  TYLENOL  Take 2 tablets (650 mg total) by mouth every 6 (six) hours as needed for Pain.     albuterol-ipratropium 2.5 mg-0.5 mg/3 mL nebulizer solution  Commonly known as:  DUO-NEB  Take 3 mLs by nebulization every 4 (four) hours. Rescue     aspirin 81 MG EC tablet  Commonly known as:  ECOTRIN  Take 1 tablet (81 mg total) by mouth once  daily.     cloNIDine 0.3 MG tablet  Commonly known as:  CATAPRES  Take 1 tablet (0.3 mg total) by mouth 3 (three) times daily.     clopidogrel 75 mg tablet  Commonly known as:  PLAVIX  Take 75 mg by mouth once daily.     COZAAR 100 MG tablet  Generic drug:  losartan  Take 100 mg by mouth once daily.     diltiaZEM 240 MG 24 hr capsule  Commonly known as:  CARDIZEM CD  Take 1 capsule (240 mg total) by mouth once daily.     DULoxetine 60 MG capsule  Commonly known as:  CYMBALTA  Take 60 mg by mouth once daily.     fluticasone-vilanterol 100-25 mcg/dose diskus inhaler  Commonly known as:  BREO  Inhale 1 puff into the lungs once daily. Controller     furosemide 40 MG tablet  Commonly known as:  LASIX  Take 40 mg by mouth once daily.     hydrALAZINE 25 MG tablet  Commonly known as:  APRESOLINE  Take 1 tablet (25 mg total) by mouth every 8 (eight) hours.     Lactobacillus acidophilus 1 billion cell Cap  Take 1 tablet by mouth 2 (two) times daily.     megestrol 40 MG Tab  Commonly known as:  MEGACE  Take 40 mg by mouth 2 (two) times daily.     ondansetron 4 MG tablet  Commonly known as:  ZOFRAN  Take 1 tablet (4 mg total) by mouth every 6 (six) hours as needed.     pantoprazole 40 MG tablet  Commonly known as:  PROTONIX  Take 40 mg by mouth once daily.     predniSONE 10 MG tablet  Commonly known as:  DELTASONE  Take 10 mg by mouth once daily.     pyridoxine (vitamin B6) 50 MG Tab  Commonly known as:  VITAMIN B-6  Take 1 tablet (50 mg total) by mouth once daily.     SENNA WITH DOCUSATE SODIUM 8.6-50 mg per tablet  Generic drug:  senna-docusate 8.6-50 mg  Take 1 tablet by mouth 2 (two) times daily.     simethicone 80 mg Tab  Take 160 mg by mouth every 6 (six) hours as needed for Flatulence.     simvastatin 40 MG tablet  Commonly known as:  ZOCOR  Take 0.5 tablets (20 mg total) by mouth every evening.        STOP taking these medications    insulin lispro 100 unit/mL Inpn pen  Commonly known as:  HUMALOG KWIKPEN             Indwelling Lines/Drains at time of discharge:   Lines/Drains/Airways     Central Venous Catheter Line                 Port A Cath Single Lumen right subclavian -- days         Port A Cath Single Lumen 10/04/18 0302 right subclavian 8 days          Pressure Ulcer                 Pressure Injury 09/11/18 1515 medial Sacral spine Stage 2 31 days                Time spent on the discharge of patient: 45 minutes  Patient was seen and examined on the date of discharge and determined to be suitable for discharge.         Wayne Morris MD  Department of Hospital Medicine  [facility]

## 2018-10-12 NOTE — NURSING
Patient has discharge order. Called SW to see update on DME that family needed for care. Called family and spoke with daughter. Waiting for call/update from SW. Will continue to monitor.

## 2018-10-12 NOTE — PLAN OF CARE
Problem: Patient Care Overview  Goal: Plan of Care Review  Outcome: Ongoing (interventions implemented as appropriate)  Patient has been alert throughout shift. Rest in between care. RLE elevated off bed. RLE in an SALIMA cast, toes move and cap refill less than 3 sec. Pain medication given as scheduled. B/P was low this morning and this afternoon. Per team held b/p meds. Patient does slightly reposition self. Patient has been using a bedpan. Patient calls when they need assistance. Patient to be discharged today once DME is delivered.

## 2018-10-12 NOTE — NURSING
Reviewed AVS with patient and daughter. Review teaching concerning the administration of tylenol and not to exceed 3,000 mg in a 24 hour period. That they can alternate with tramadol. DME was delivered and at bedside. Transportation called and will be here within the hour.

## 2018-10-12 NOTE — PLAN OF CARE
ION confirmed with [facility] RN, Marilyn, that pt's equipment has been delivered and that she was ready for transport.  SW spoke with daughter Kandice and was asked to arrange transport as soon as possible.  ION called [facility] WC van transport and arranged a time for 330 to pt's home in [place].  Transport packet printed and sent to observation unit via tube system.          Tamika Kaminski LMSW

## 2018-10-12 NOTE — PLAN OF CARE
Problem: Patient Care Overview  Goal: Plan of Care Review  Outcome: Ongoing (interventions implemented as appropriate)  Plan of care reviewed with patient. Verbalized understanding. No acute events overnight. Vitals stable. Remains free from falls. Bed low, call light in reach. Will continue to monitor.

## 2018-10-13 NOTE — ASSESSMENT & PLAN NOTE
+ wound cultures with MRSA, and gram negatives  Cont contract precautions  Patient may ambulate in hallway and and go to gym using SNF isolation protocol.   Per ID:  1. Will need to therefore have longer course of antibiotics for possible fluid collection  2. Cont IV ertapenem and vanc   3. Aim for 4 weeks antibiotics after discharge with re imaging as outpatient 2 weeks post discharge, in the middle of the course to assess improvement in fluid collection  4. Follow up with [facility] Rylie MONTERO within 2 weeks     Future Appointments   Date Time Provider Department Center   10/22/2018  2:45 PM Sarah Lujan PA-C [facility] ORTHO Darren Gee

## 2018-10-13 NOTE — HOSPITAL COURSE
9/23/18:  Patient seen and examined at bedside. She appears confused, lethargic and tachypneic. Will send to ED for further evaluation.

## 2018-10-13 NOTE — ASSESSMENT & PLAN NOTE
Maintain euvolemia by monitoring I/O's and daily weights.  Fluid restriction (2 liters/24 hours)  Low Na diet  Cont Losartan, lasix, and diltiazem to treat   No data found.

## 2018-10-13 NOTE — ASSESSMENT & PLAN NOTE
Cont with PT/OT for gait training and strengthening and restoration of ADL's   Fall precautions   Cont DVT prophylaxis with lovenox   No current facility-administered medications for this encounter.

## 2018-10-13 NOTE — PT/OT/SLP DISCHARGE
Occupational Therapy Discharge Summary    Sheryl Martines  MRN: [id number]   Principal Problem: Closed fracture of right tibia and fibula      Patient Discharged from acute Occupational Therapy on 10/12/2018.  Please refer to prior OT note dated 10/11/2018 for functional status.    Assessment:      Patient appropriate for care in another setting.    Objective:     GOALS:   Multidisciplinary Problems     Occupational Therapy Goals     Not on file          Multidisciplinary Problems (Resolved)        Problem: Occupational Therapy Goal    Goal Priority Disciplines Outcome Interventions   Occupational Therapy Goal   (Resolved)     OT, PT/OT Outcome(s) achieved    Description:  Goals to be met by: 10/18/18     Patient will increase functional independence with ADLs by performing:    UE Dressing with Set-up Assistance and Supervision.  LE Dressing with Minimal Assistance, with modification/AE prn  Grooming while seated with Supervision. Met 10/8  Toileting from bedside commode with Moderate Assistance for hygiene and clothing management.   Toilet transfer to bedside commode with Minimal Assistance.                       Reasons for Discontinuation of Therapy Services  Transfer to alternate level of care.      Plan:     Patient Discharged to: Home w/ family care    Queta Mendoza OT  10/13/2018

## 2018-10-13 NOTE — DISCHARGE SUMMARY
[facility] [place] [facility] - [place] [facility]  Department of [facility] Medicine  Discharge Summary      Patient Name: Sheryl Martines  MRN: [id number]  Admission Date: 9/20/2018  Hospital Length of Stay: 3 days  Discharge Date and Time: 9/23/2018 11:50 AM  Attending Physician: Arlene att. providers found   Discharging Provider: Shyam Del Valle MD  Primary Care Provider: Primary Doctor Arlene    HPI:   Chief Complaint/Reason for Admission: Debility    History of Present Illness:  Patient is a 63 y.o. female who has a past medical history of Asthma, Closed compression fracture of fourth lumbar vertebra, COPD, Coronary artery disease, Diabetes mellitus, Glaucoma, High cholesterol, Hypertension, Iritis, Pulmonary embolus, and Stroke presented with appendicitis and underwent open appendectomy surgery on 08/26. Patient tolerated procedure well with no significant post operative complications. Subsequently her wound cultures grew EColi, Enterococcus, and MRSA and she was treated with cipro, flagyl and vancomycin. She was also found to have CDiff and she was treated with oral vancomycin. She then presented with fever and increased drainage from the appendectomy incision site. She then became unstable and was admitted to ICU for severe sepsis. Source of sepsis was not clear and patient started treatment with broad antibiotics to cover abdominal source as well as lung. Surgery had not plans for surgery and recommended long term antibiotics to treat wound infection. ID recommended IV ertapenem and vancomycin for 4 weeks. Patient has been working with PT/OT who recommend SNF for further balance/mobility training.     * No surgery found *      Hospital Course:   9/23/18:  Patient seen and examined at bedside. She appears confused, lethargic and tachypneic. Will send to ED for further evaluation.      Consults (From admission, onward)        Status Ordering Provider     Inpatient consult to Registered Dietitian/Nutritionist  Once     Provider:  (Not yet  assigned)    Completed MILTON COUGHLIN          Significant Diagnostic Studies: Labs:   BMP:   Recent Labs   Lab  10/11/18   1755  10/12/18   0604   GLU  449*  132*   NA   --   137   K   --   4.4   CL   --   103   CO2   --   26   BUN   --   20   CREATININE   --   0.9   CALCIUM   --   8.9    and CMP   Recent Labs   Lab  10/11/18   1755  10/12/18   0604   NA   --   137   K   --   4.4   CL   --   103   CO2   --   26   GLU  449*  132*   BUN   --   20   CREATININE   --   0.9   CALCIUM   --   8.9   ANIONGAP   --   8   ESTGFRAFRICA   --   >60.0   EGFRNONAA   --   >60.0       Pending Diagnostic Studies:     None        Final Active Diagnoses:    Diagnosis Date Noted POA    PRINCIPAL PROBLEM:  Altered mental status [R41.82] 09/23/2018 Yes    Gastroesophageal reflux disease without esophagitis [K21.9] 09/21/2018 Yes    Essential hypertension [I10] 09/21/2018 Yes    Decubitus ulcer of buttock, stage 2 [L89.302] 09/21/2018 Yes    Moderate malnutrition [E44.0] 09/21/2018 Yes    Infected incision [T81.4XXA] 09/20/2018 Yes    Adult failure to thrive [R62.7] 09/18/2018 Yes    Debilitated patient [R53.81]  Yes    S/P appendectomy [Z90.49] 09/11/2018 Not Applicable    (HFpEF) heart failure with preserved ejection fraction [I50.30] 09/11/2018 Yes    Moderate asthma without complication [J45.909] 09/11/2018 Yes    CAD (coronary artery disease) [I25.10] 08/23/2018 Yes    CVA, old, hemiparesis [I69.359] 08/23/2018 Not Applicable    Insulin dependent diabetes mellitus [E11.9, Z79.4] 08/23/2018 Not Applicable      Problems Resolved During this Admission:      * Altered mental status    Etiology unclear. Will send to ED for further evaluation.        Infected incision    + wound cultures with MRSA, and gram negatives  Cont contract precautions  Patient may ambulate in hallway and and go to gym using SNF isolation protocol.   Per ID:  1. Will need to therefore have longer course of antibiotics for possible fluid  collection  2. Cont IV ertapenem and vanc   3. Aim for 4 weeks antibiotics after discharge with re imaging as outpatient 2 weeks post discharge, in the middle of the course to assess improvement in fluid collection  4. [place] [facility] within 2 weeks     Future Appointments   Date Time Provider Department Center   10/22/2018  2:45 PM Sarah Lujan PA-C [facility]             Adult failure to thrive    Cont megace   Cont nutritional supplements  Nutrition consult        Debilitated patient    Cont with PT/OT for gait training and strengthening and restoration of ADL's   Fall precautions   Cont DVT prophylaxis with lovenox   No current facility-administered medications for this encounter.              (HFpEF) heart failure with preserved ejection fraction    Maintain euvolemia by monitoring I/O's and daily weights.  Fluid restriction (2 liters/24 hours)  Low Na diet  Cont Losartan, lasix, and diltiazem to treat   No data found.          Moderate asthma without complication    Satting well on room air.   Cont Theophylline and prednisone to treat  Cont duo nebs and supplemental oxygen as needed.         S/P appendectomy    Plan as above  Cont to monitor surgical site.   Follow up with surgery for wound check          CVA, old, hemiparesis    Cont ASA, Plavix, statin for secondary prevention.         CAD (coronary artery disease)    Cont medical management with ASA, Plavix, losartan, simvastatin.         Insulin dependent diabetes mellitus    POCT Glucose   Date Value Ref Range Status   10/12/2018 301 (H) 70 - 110 mg/dL Final   10/12/2018 113 (H) 70 - 110 mg/dL Final   10/12/2018 139 (H) 70 - 110 mg/dL Final   10/11/2018 192 (H) 70 - 110 mg/dL Final   10/11/2018 378 (H) 70 - 110 mg/dL Final   10/11/2018 426 (H) 70 - 110 mg/dL Final   10/11/2018 178 (H) 70 - 110 mg/dL Final   10/11/2018 76 70 - 110 mg/dL Final   10/10/2018 115 (H) 70 - 110 mg/dL Final   10/10/2018 129 (H) 70 - 110 mg/dL Final     BG  well controlled  Cont current basal/prandial insulin regimen   Low dose correction scale   Cont blood glucose monitoring   ADA diet              Discharged Condition: fair    Disposition: Short Term Hospital    Follow Up:    Patient Instructions:   No discharge procedures on file.  Medications:  Transfer Medications (for Discharge Readmit only):   No current facility-administered medications for this encounter.      Current Outpatient Medications   Medication Sig Dispense Refill    acetaminophen (TYLENOL) 325 MG tablet Take 2 tablets (650 mg total) by mouth every 6 (six) hours as needed for Pain.      albuterol-ipratropium (DUO-NEB) 2.5 mg-0.5 mg/3 mL nebulizer solution Take 3 mLs by nebulization every 4 (four) hours. Rescue      aspirin (ECOTRIN) 81 MG EC tablet Take 1 tablet (81 mg total) by mouth once daily. 30 tablet 11    cloNIDine (CATAPRES) 0.3 MG tablet Take 1 tablet (0.3 mg total) by mouth 3 (three) times daily. 90 tablet 3    clopidogrel (PLAVIX) 75 mg tablet Take 75 mg by mouth once daily.      diltiaZEM (CARDIZEM CD) 240 MG 24 hr capsule Take 1 capsule (240 mg total) by mouth once daily. 30 capsule 11    DULoxetine (CYMBALTA) 60 MG capsule Take 60 mg by mouth once daily.      ferrous sulfate 325 (65 FE) MG EC tablet Take 1 tablet (325 mg total) by mouth once daily.  0    fluticasone-vilanterol (BREO) 100-25 mcg/dose diskus inhaler Inhale 1 puff into the lungs once daily. Controller 60 each 3    furosemide (LASIX) 40 MG tablet Take 40 mg by mouth once daily.       hydrALAZINE (APRESOLINE) 25 MG tablet Take 1 tablet (25 mg total) by mouth every 8 (eight) hours. 90 tablet 3    insulin aspart U-100 (NOVOLOG) 100 unit/mL InPn pen Inject 0-5 Units into the skin 4 (four) times daily before meals and nightly. 5 Box 10    insulin aspart U-100 (NOVOLOG) 100 unit/mL InPn pen Inject 12 Units into the skin 3 (three) times daily. 5 Box 10    insulin detemir U-100 (LEVEMIR FLEXTOUCH) 100 unit/mL (3 mL)  SubQ InPn pen Inject 28 Units into the skin once daily. 5 Box 10    Lactobacillus acidophilus 1 billion cell Cap Take 1 tablet by mouth 2 (two) times daily.      losartan (COZAAR) 100 MG tablet Take 100 mg by mouth once daily.       megestrol (MEGACE) 40 MG Tab Take 40 mg by mouth 2 (two) times daily.      ondansetron (ZOFRAN) 4 MG tablet Take 1 tablet (4 mg total) by mouth every 6 (six) hours as needed. 30 tablet 1    pantoprazole (PROTONIX) 40 MG tablet Take 40 mg by mouth once daily.      predniSONE (DELTASONE) 10 MG tablet Take 10 mg by mouth once daily.       pyridoxine, vitamin B6, (VITAMIN B-6) 50 MG Tab Take 1 tablet (50 mg total) by mouth once daily.  0    senna-docusate 8.6-50 mg (SENNA WITH DOCUSATE SODIUM) 8.6-50 mg per tablet Take 1 tablet by mouth 2 (two) times daily.      simethicone 80 mg Tab Take 160 mg by mouth every 6 (six) hours as needed for Flatulence.      simvastatin (ZOCOR) 40 MG tablet Take 0.5 tablets (20 mg total) by mouth every evening. 30 tablet 3    traMADol (ULTRAM) 50 mg tablet Take 1 tablet (50 mg total) by mouth every 6 (six) hours as needed. 28 tablet 0     Time spent on the discharge of patient: 20 minutes    Shyam Del Valle MD  Department of Hospital Medicine  [facility] [place] [facility] - Skilled Nursing Care

## 2018-10-13 NOTE — ASSESSMENT & PLAN NOTE
POCT Glucose   Date Value Ref Range Status   10/12/2018 301 (H) 70 - 110 mg/dL Final   10/12/2018 113 (H) 70 - 110 mg/dL Final   10/12/2018 139 (H) 70 - 110 mg/dL Final   10/11/2018 192 (H) 70 - 110 mg/dL Final   10/11/2018 378 (H) 70 - 110 mg/dL Final   10/11/2018 426 (H) 70 - 110 mg/dL Final   10/11/2018 178 (H) 70 - 110 mg/dL Final   10/11/2018 76 70 - 110 mg/dL Final   10/10/2018 115 (H) 70 - 110 mg/dL Final   10/10/2018 129 (H) 70 - 110 mg/dL Final     BG well controlled  Cont current basal/prandial insulin regimen   Low dose correction scale   Cont blood glucose monitoring   ADA diet

## 2018-10-19 ENCOUNTER — TELEPHONE (OUTPATIENT)
Dept: ADMINISTRATIVE | Facility: CLINIC | Age: 63
End: 2018-10-19

## 2018-10-19 NOTE — TELEPHONE ENCOUNTER
Home Health SOC 10/03/2018 - 12/01/2018 with [facility] ([facility]) - Dr. Servando García. Patient received SN, PT, OT, MSW and HHA services.

## 2018-10-22 ENCOUNTER — TELEPHONE (OUTPATIENT)
Dept: ORTHOPEDICS | Facility: CLINIC | Age: 63
End: 2018-10-22

## 2018-10-22 ENCOUNTER — HOSPITAL ENCOUNTER (OUTPATIENT)
Dept: RADIOLOGY | Facility: HOSPITAL | Age: 63
Discharge: HOME | End: 2018-10-22
Attending: PHYSICIAN ASSISTANT
Payer: MEDICARE

## 2018-10-22 ENCOUNTER — OFFICE VISIT (OUTPATIENT)
Dept: ORTHOPEDICS | Facility: CLINIC | Age: 63
End: 2018-10-22
Payer: MEDICARE

## 2018-10-22 VITALS
WEIGHT: 165.13 LBS | RESPIRATION RATE: 16 BRPM | HEIGHT: 60 IN | SYSTOLIC BLOOD PRESSURE: 172 MMHG | BODY MASS INDEX: 32.42 KG/M2 | HEART RATE: 106 BPM | TEMPERATURE: 98 F | DIASTOLIC BLOOD PRESSURE: 94 MMHG

## 2018-10-22 DIAGNOSIS — S82.201A CLOSED FRACTURE OF RIGHT TIBIA AND FIBULA, INITIAL ENCOUNTER: Primary | ICD-10-CM

## 2018-10-22 DIAGNOSIS — S82.401A CLOSED FRACTURE OF RIGHT TIBIA AND FIBULA, INITIAL ENCOUNTER: ICD-10-CM

## 2018-10-22 DIAGNOSIS — S82.201A CLOSED FRACTURE OF RIGHT TIBIA AND FIBULA, INITIAL ENCOUNTER: ICD-10-CM

## 2018-10-22 DIAGNOSIS — S82.401A CLOSED FRACTURE OF RIGHT TIBIA AND FIBULA, INITIAL ENCOUNTER: Primary | ICD-10-CM

## 2018-10-22 PROCEDURE — 99999 PR PBB SHADOW E&M-EST. PATIENT-LVL V: CPT | Mod: PBBFAC,,, | Performed by: PHYSICIAN ASSISTANT

## 2018-10-22 PROCEDURE — 73590 X-RAY EXAM OF LOWER LEG: CPT | Mod: TC,RT

## 2018-10-22 PROCEDURE — 3008F BODY MASS INDEX DOCD: CPT | Mod: ,,, | Performed by: PHYSICIAN ASSISTANT

## 2018-10-22 PROCEDURE — 99213 OFFICE O/P EST LOW 20 MIN: CPT | Mod: S$PBB,,, | Performed by: PHYSICIAN ASSISTANT

## 2018-10-22 PROCEDURE — 73590 X-RAY EXAM OF LOWER LEG: CPT | Mod: 26,RT,, | Performed by: RADIOLOGY

## 2018-10-22 PROCEDURE — 99215 OFFICE O/P EST HI 40 MIN: CPT | Mod: PBBFAC,25 | Performed by: PHYSICIAN ASSISTANT

## 2018-10-22 NOTE — TELEPHONE ENCOUNTER
----- Message from Bryant Cohen sent at 10/22/2018  8:46 AM CDT -----  Contact: Daughter/ [phone]  Citlaly guadalupe is the patient daughter and would like a call back from someone in office about the patient transportation for her appt.

## 2018-10-22 NOTE — TELEPHONE ENCOUNTER
----- Message from Bryant Cohen sent at 10/22/2018  3:03 PM CDT -----  Contact: Self/ [phone]  Patient would like a call back to see if she can still come to her appt. Patient is coming now she is late due to her transpiration.

## 2018-10-23 NOTE — PROGRESS NOTES
Subjective:      Patient ID: Sheryl Martines is a 63 y.o. female.    Chief Complaint: Swelling, Pain, and Injury of the Right Lower Leg    HPI    Patient is a 63 y.o. female  with history of DM, osteoporosis and pontine stroke 2012 presents to clinic for follow up from hospital orthopedic consult for right Non displaced oblique right tibia fracture at metaphysis into diaphysis secondary to falling form a standing height on 10/06/2018 after her knees gave out.  Due to patients comorbid medical conditions and at present her blood sugar is running very high she decided to be treated non-operative. She has been in a long leg split. She stated that she is doing ok. Pain is controlled. She is using a wheelchair to assist with ambulation.      Patient ambulates at baseline with Rollator.   On aspirin and plavix    Review of Systems   Constitution: Negative for chills and fever.   Cardiovascular: Negative for chest pain.   Respiratory: Negative for cough and shortness of breath.    Skin: Negative for color change, dry skin, itching, nail changes, poor wound healing and rash.   Musculoskeletal:        Right leg fracture.    Neurological: Negative for dizziness.   Psychiatric/Behavioral: Negative for altered mental status. The patient is not nervous/anxious.    All other systems reviewed and are negative.        Objective:      General    Constitutional: She is oriented to person, place, and time. She appears well-developed and well-nourished. No distress.   HENT:   Head: Atraumatic.   Eyes: Conjunctivae are normal.   Cardiovascular: Normal rate.    Pulmonary/Chest: Effort normal.   Neurological: She is alert and oriented to person, place, and time.   Psychiatric: She has a normal mood and affect. Her behavior is normal.           Right Knee Exam     Comments:  Presents to clinic with her daughter in wheelchair, splint removed, quarter size wound to anterior lateral knee no signs of infection  TTP top fracture site.        RADS: No fracture dislocation bone destruction seen.  There are healing fractures of the tibia and fibula.  Bones show good alignment no complication.        Assessment:       Encounter Diagnosis   Name Primary?    Closed fracture of right tibia and fibula, initial encounter Yes          Plan:       Discussed plan with patient and family, Continue non-operative treatment  - placed into hinge knee brace  - NWB  - wound dressed, she will do daily dressing changes.   - pain medication: no refill needed  - return to clinic in 2 weeks at time x-ray of her tib fib is needed.

## 2018-10-25 ENCOUNTER — TELEPHONE (OUTPATIENT)
Dept: ORTHOPEDICS | Facility: CLINIC | Age: 63
End: 2018-10-25

## 2018-10-25 NOTE — TELEPHONE ENCOUNTER
----- Message from Khushi Whitmore sent at 10/25/2018  3:16 PM CDT -----  Contact: Julian([facility])  Julian is calling to see if Wound care can be preform on right knee,     
----- Message from Mirella Dyson MA sent at 10/25/2018  3:54 PM CDT -----  Contact: Julian([facility])  Notified Julian majano/ [facility]. Julian is awre that RICCARDO Lujan PA-C is not in the office today 10/25/18 and retreive messages on Monday 10/25/18. And Julian message will be sent above name provider.please contact Julian [phone].  ----- Message -----  From: Khushi Whitmore  Sent: 10/25/2018   3:16 PM  To: Tai Smith Staff    Julian is calling to see if Wound care can be preform on right knee,   [phone]    
Marilyn will be talking to patient.  
Returned call to Julian and left a voicemail letting her know that daily wound care is ok for the patient per Sarah's last note.  
You can access the [facility], offered by [facility], by registering with the following website: [email or web address]

## 2018-10-29 ENCOUNTER — TELEPHONE (OUTPATIENT)
Dept: ORTHOPEDICS | Facility: CLINIC | Age: 63
End: 2018-10-29

## 2018-10-29 NOTE — TELEPHONE ENCOUNTER
Spoke with Julian, verbal given for wound cleaning and medihoney to wound bed.     ----- Message from Marilyn Jordan MA sent at 10/25/2018  4:10 PM CDT -----  Sarah Jordan with [facility] called and wants specific orders for wound care to pt's knee wound.  Advised that you were out of the office until Monday.  Julian asked that you call her back on Monday with directions for wound care.   Pt will continue daily dressing changes as stated in your last office notes until that time.   Julian requesting orders to clean with saline and use cornelius honey bandages 3 x weekly.  Julian can be reached at [phone]  Thanks  Amie

## 2018-10-31 ENCOUNTER — HOSPITAL ENCOUNTER (INPATIENT)
Facility: HOSPITAL | Age: 63
LOS: 4 days | Discharge: HOME HEALTH | DRG: 689 | End: 2018-11-04
Attending: EMERGENCY MEDICINE | Admitting: EMERGENCY MEDICINE
Payer: MEDICARE

## 2018-10-31 DIAGNOSIS — E83.42 HYPOMAGNESEMIA: ICD-10-CM

## 2018-10-31 DIAGNOSIS — G93.41 ACUTE METABOLIC ENCEPHALOPATHY: Primary | ICD-10-CM

## 2018-10-31 DIAGNOSIS — R06.02 SHORTNESS OF BREATH: ICD-10-CM

## 2018-10-31 LAB
ALBUMIN SERPL BCP-MCNC: 2.2 G/DL
ALLENS TEST: ABNORMAL
ALP SERPL-CCNC: 103 U/L
ALT SERPL W/O P-5'-P-CCNC: 23 U/L
ANION GAP SERPL CALC-SCNC: 7 MMOL/L
AST SERPL-CCNC: 30 U/L
BASOPHILS # BLD AUTO: 0.06 K/UL
BASOPHILS NFR BLD: 0.5 %
BILIRUB SERPL-MCNC: 0.8 MG/DL
BNP SERPL-MCNC: 31 PG/ML
BUN SERPL-MCNC: 4 MG/DL
CALCIUM SERPL-MCNC: 9.2 MG/DL
CHLORIDE SERPL-SCNC: 102 MMOL/L
CO2 SERPL-SCNC: 28 MMOL/L
CREAT SERPL-MCNC: 1 MG/DL
DELSYS: ABNORMAL
DIFFERENTIAL METHOD: ABNORMAL
EOSINOPHIL # BLD AUTO: 0.1 K/UL
EOSINOPHIL NFR BLD: 1.2 %
ERYTHROCYTE [DISTWIDTH] IN BLOOD BY AUTOMATED COUNT: 17.2 %
EST. GFR  (AFRICAN AMERICAN): >60 ML/MIN/1.73 M^2
EST. GFR  (NON AFRICAN AMERICAN): >60 ML/MIN/1.73 M^2
GLUCOSE SERPL-MCNC: 231 MG/DL
HCO3 UR-SCNC: 27 MMOL/L (ref 24–28)
HCT VFR BLD AUTO: 28.9 %
HGB BLD-MCNC: 8.7 G/DL
IMM GRANULOCYTES # BLD AUTO: 0.05 K/UL
IMM GRANULOCYTES NFR BLD AUTO: 0.4 %
LACTATE SERPL-SCNC: 1.6 MMOL/L
LYMPHOCYTES # BLD AUTO: 5.5 K/UL
LYMPHOCYTES NFR BLD: 46.1 %
MAGNESIUM SERPL-MCNC: 1 MG/DL
MCH RBC QN AUTO: 25.8 PG
MCHC RBC AUTO-ENTMCNC: 30.1 G/DL
MCV RBC AUTO: 86 FL
MONOCYTES # BLD AUTO: 0.9 K/UL
MONOCYTES NFR BLD: 7.6 %
NEUTROPHILS # BLD AUTO: 5.3 K/UL
NEUTROPHILS NFR BLD: 44.2 %
NRBC BLD-RTO: 0 /100 WBC
PCO2 BLDA: 39.9 MMHG (ref 35–45)
PH SMN: 7.44 [PH] (ref 7.35–7.45)
PHOSPHATE SERPL-MCNC: 3.3 MG/DL
PLATELET # BLD AUTO: 387 K/UL
PMV BLD AUTO: 9.6 FL
PO2 BLDA: 86 MMHG (ref 80–100)
POC BE: 3 MMOL/L
POC SATURATED O2: 97 % (ref 95–100)
POC TCO2: 28 MMOL/L (ref 23–27)
POCT GLUCOSE: 225 MG/DL (ref 70–110)
POTASSIUM SERPL-SCNC: 3.4 MMOL/L
PROT SERPL-MCNC: 5.8 G/DL
RBC # BLD AUTO: 3.37 M/UL
SAMPLE: ABNORMAL
SITE: ABNORMAL
SODIUM SERPL-SCNC: 137 MMOL/L
TROPONIN I SERPL DL<=0.01 NG/ML-MCNC: 0.02 NG/ML
WBC # BLD AUTO: 11.9 K/UL

## 2018-10-31 PROCEDURE — 87077 CULTURE AEROBIC IDENTIFY: CPT | Mod: 59

## 2018-10-31 PROCEDURE — 99285 EMERGENCY DEPT VISIT HI MDM: CPT | Mod: 25

## 2018-10-31 PROCEDURE — 84484 ASSAY OF TROPONIN QUANT: CPT

## 2018-10-31 PROCEDURE — 25000242 PHARM REV CODE 250 ALT 637 W/ HCPCS: Performed by: STUDENT IN AN ORGANIZED HEALTH CARE EDUCATION/TRAINING PROGRAM

## 2018-10-31 PROCEDURE — 96361 HYDRATE IV INFUSION ADD-ON: CPT

## 2018-10-31 PROCEDURE — 12000002 HC ACUTE/MED SURGE SEMI-PRIVATE ROOM

## 2018-10-31 PROCEDURE — 25000003 PHARM REV CODE 250: Performed by: STUDENT IN AN ORGANIZED HEALTH CARE EDUCATION/TRAINING PROGRAM

## 2018-10-31 PROCEDURE — 80053 COMPREHEN METABOLIC PANEL: CPT

## 2018-10-31 PROCEDURE — 94640 AIRWAY INHALATION TREATMENT: CPT

## 2018-10-31 PROCEDURE — 82803 BLOOD GASES ANY COMBINATION: CPT

## 2018-10-31 PROCEDURE — 82800 BLOOD PH: CPT

## 2018-10-31 PROCEDURE — 83735 ASSAY OF MAGNESIUM: CPT

## 2018-10-31 PROCEDURE — 94761 N-INVAS EAR/PLS OXIMETRY MLT: CPT

## 2018-10-31 PROCEDURE — 51702 INSERT TEMP BLADDER CATH: CPT

## 2018-10-31 PROCEDURE — 96375 TX/PRO/DX INJ NEW DRUG ADDON: CPT

## 2018-10-31 PROCEDURE — 82962 GLUCOSE BLOOD TEST: CPT

## 2018-10-31 PROCEDURE — 85025 COMPLETE CBC W/AUTO DIFF WBC: CPT

## 2018-10-31 PROCEDURE — 87186 SC STD MICRODIL/AGAR DIL: CPT

## 2018-10-31 PROCEDURE — 83605 ASSAY OF LACTIC ACID: CPT

## 2018-10-31 PROCEDURE — 27000221 HC OXYGEN, UP TO 24 HOURS

## 2018-10-31 PROCEDURE — 36600 WITHDRAWAL OF ARTERIAL BLOOD: CPT

## 2018-10-31 PROCEDURE — 93010 ELECTROCARDIOGRAM REPORT: CPT | Mod: ,,, | Performed by: INTERNAL MEDICINE

## 2018-10-31 PROCEDURE — 84100 ASSAY OF PHOSPHORUS: CPT

## 2018-10-31 PROCEDURE — 99900035 HC TECH TIME PER 15 MIN (STAT)

## 2018-10-31 PROCEDURE — 99285 EMERGENCY DEPT VISIT HI MDM: CPT | Mod: ,,, | Performed by: EMERGENCY MEDICINE

## 2018-10-31 PROCEDURE — 87040 BLOOD CULTURE FOR BACTERIA: CPT

## 2018-10-31 PROCEDURE — 83880 ASSAY OF NATRIURETIC PEPTIDE: CPT

## 2018-10-31 PROCEDURE — 93005 ELECTROCARDIOGRAM TRACING: CPT

## 2018-10-31 PROCEDURE — 63600175 PHARM REV CODE 636 W HCPCS: Performed by: STUDENT IN AN ORGANIZED HEALTH CARE EDUCATION/TRAINING PROGRAM

## 2018-10-31 RX ORDER — IPRATROPIUM BROMIDE AND ALBUTEROL SULFATE 2.5; .5 MG/3ML; MG/3ML
3 SOLUTION RESPIRATORY (INHALATION)
Status: COMPLETED | OUTPATIENT
Start: 2018-10-31 | End: 2018-10-31

## 2018-10-31 RX ORDER — METHYLPREDNISOLONE SOD SUCC 125 MG
125 VIAL (EA) INJECTION
Status: COMPLETED | OUTPATIENT
Start: 2018-10-31 | End: 2018-10-31

## 2018-10-31 RX ADMIN — METHYLPREDNISOLONE SODIUM SUCCINATE 125 MG: 125 INJECTION, POWDER, FOR SOLUTION INTRAMUSCULAR; INTRAVENOUS at 11:10

## 2018-10-31 RX ADMIN — IPRATROPIUM BROMIDE AND ALBUTEROL SULFATE 3 ML: .5; 3 SOLUTION RESPIRATORY (INHALATION) at 11:10

## 2018-10-31 RX ADMIN — SODIUM CHLORIDE 1000 ML: 0.9 INJECTION, SOLUTION INTRAVENOUS at 11:10

## 2018-11-01 PROBLEM — Z75.8 DISCHARGE PLANNING ISSUES: Status: ACTIVE | Noted: 2018-11-01

## 2018-11-01 PROBLEM — E86.0 DEHYDRATION: Status: ACTIVE | Noted: 2018-11-01

## 2018-11-01 PROBLEM — J45.901 ASTHMA WITH ACUTE EXACERBATION: Status: ACTIVE | Noted: 2018-10-04

## 2018-11-01 PROBLEM — E83.42 HYPOMAGNESEMIA: Status: ACTIVE | Noted: 2018-11-01

## 2018-11-01 PROBLEM — I63.9 CVA (CEREBRAL VASCULAR ACCIDENT): Status: RESOLVED | Noted: 2018-08-23 | Resolved: 2018-11-01

## 2018-11-01 LAB
ANION GAP SERPL CALC-SCNC: 10 MMOL/L
ANION GAP SERPL CALC-SCNC: 12 MMOL/L
BACTERIA #/AREA URNS AUTO: ABNORMAL /HPF
BASOPHILS # BLD AUTO: 0.02 K/UL
BASOPHILS NFR BLD: 0.2 %
BILIRUB UR QL STRIP: NEGATIVE
BUN SERPL-MCNC: 5 MG/DL
BUN SERPL-MCNC: 8 MG/DL
CALCIUM SERPL-MCNC: 8.4 MG/DL
CALCIUM SERPL-MCNC: 8.5 MG/DL
CHLORIDE SERPL-SCNC: 104 MMOL/L
CHLORIDE SERPL-SCNC: 106 MMOL/L
CLARITY UR REFRACT.AUTO: ABNORMAL
CO2 SERPL-SCNC: 22 MMOL/L
CO2 SERPL-SCNC: 23 MMOL/L
COLOR UR AUTO: YELLOW
CREAT SERPL-MCNC: 1 MG/DL
CREAT SERPL-MCNC: 1.1 MG/DL
DIFFERENTIAL METHOD: ABNORMAL
EOSINOPHIL # BLD AUTO: 0 K/UL
EOSINOPHIL NFR BLD: 0 %
ERYTHROCYTE [DISTWIDTH] IN BLOOD BY AUTOMATED COUNT: 17.2 %
EST. GFR  (AFRICAN AMERICAN): >60 ML/MIN/1.73 M^2
EST. GFR  (AFRICAN AMERICAN): >60 ML/MIN/1.73 M^2
EST. GFR  (NON AFRICAN AMERICAN): 53.6 ML/MIN/1.73 M^2
EST. GFR  (NON AFRICAN AMERICAN): >60 ML/MIN/1.73 M^2
GLUCOSE SERPL-MCNC: 349 MG/DL
GLUCOSE SERPL-MCNC: 474 MG/DL
GLUCOSE UR QL STRIP: ABNORMAL
HCT VFR BLD AUTO: 26.3 %
HGB BLD-MCNC: 7.9 G/DL
HGB UR QL STRIP: NEGATIVE
HYALINE CASTS UR QL AUTO: 3 /LPF
IMM GRANULOCYTES # BLD AUTO: 0.06 K/UL
IMM GRANULOCYTES NFR BLD AUTO: 0.6 %
KETONES UR QL STRIP: NEGATIVE
LEUKOCYTE ESTERASE UR QL STRIP: ABNORMAL
LYMPHOCYTES # BLD AUTO: 0.5 K/UL
LYMPHOCYTES NFR BLD: 4.6 %
MAGNESIUM SERPL-MCNC: 1.2 MG/DL
MCH RBC QN AUTO: 26.4 PG
MCHC RBC AUTO-ENTMCNC: 30 G/DL
MCV RBC AUTO: 88 FL
MICROSCOPIC COMMENT: ABNORMAL
MONOCYTES # BLD AUTO: 0.1 K/UL
MONOCYTES NFR BLD: 0.5 %
NEUTROPHILS # BLD AUTO: 9.5 K/UL
NEUTROPHILS NFR BLD: 94.1 %
NITRITE UR QL STRIP: NEGATIVE
NRBC BLD-RTO: 0 /100 WBC
PH UR STRIP: 5 [PH] (ref 5–8)
PHOSPHATE SERPL-MCNC: 3.7 MG/DL
PLATELET # BLD AUTO: 353 K/UL
PMV BLD AUTO: 10 FL
POCT GLUCOSE: 403 MG/DL (ref 70–110)
POCT GLUCOSE: 418 MG/DL (ref 70–110)
POCT GLUCOSE: 422 MG/DL (ref 70–110)
POCT GLUCOSE: 438 MG/DL (ref 70–110)
POCT GLUCOSE: 439 MG/DL (ref 70–110)
POCT GLUCOSE: 459 MG/DL (ref 70–110)
POTASSIUM SERPL-SCNC: 3.6 MMOL/L
POTASSIUM SERPL-SCNC: 4.1 MMOL/L
PROT UR QL STRIP: ABNORMAL
RBC # BLD AUTO: 2.99 M/UL
RBC #/AREA URNS AUTO: 5 /HPF (ref 0–4)
SODIUM SERPL-SCNC: 138 MMOL/L
SODIUM SERPL-SCNC: 139 MMOL/L
SP GR UR STRIP: 1.01 (ref 1–1.03)
SQUAMOUS #/AREA URNS AUTO: 8 /HPF
URN SPEC COLLECT METH UR: ABNORMAL
WBC # BLD AUTO: 10.05 K/UL
WBC #/AREA URNS AUTO: 24 /HPF (ref 0–5)

## 2018-11-01 PROCEDURE — 63600175 PHARM REV CODE 636 W HCPCS: Performed by: INTERNAL MEDICINE

## 2018-11-01 PROCEDURE — 94640 AIRWAY INHALATION TREATMENT: CPT

## 2018-11-01 PROCEDURE — 99900035 HC TECH TIME PER 15 MIN (STAT)

## 2018-11-01 PROCEDURE — 82962 GLUCOSE BLOOD TEST: CPT

## 2018-11-01 PROCEDURE — 96367 TX/PROPH/DG ADDL SEQ IV INF: CPT

## 2018-11-01 PROCEDURE — S5571 INSULIN DISPOS PEN 3 ML: HCPCS | Performed by: STUDENT IN AN ORGANIZED HEALTH CARE EDUCATION/TRAINING PROGRAM

## 2018-11-01 PROCEDURE — 96366 THER/PROPH/DIAG IV INF ADDON: CPT

## 2018-11-01 PROCEDURE — 94660 CPAP INITIATION&MGMT: CPT

## 2018-11-01 PROCEDURE — 99223 1ST HOSP IP/OBS HIGH 75: CPT | Mod: GC,,, | Performed by: INTERNAL MEDICINE

## 2018-11-01 PROCEDURE — 11000001 HC ACUTE MED/SURG PRIVATE ROOM

## 2018-11-01 PROCEDURE — 25000003 PHARM REV CODE 250

## 2018-11-01 PROCEDURE — 63600175 PHARM REV CODE 636 W HCPCS: Performed by: STUDENT IN AN ORGANIZED HEALTH CARE EDUCATION/TRAINING PROGRAM

## 2018-11-01 PROCEDURE — 25000003 PHARM REV CODE 250: Performed by: STUDENT IN AN ORGANIZED HEALTH CARE EDUCATION/TRAINING PROGRAM

## 2018-11-01 PROCEDURE — 96365 THER/PROPH/DIAG IV INF INIT: CPT

## 2018-11-01 PROCEDURE — 96372 THER/PROPH/DIAG INJ SC/IM: CPT

## 2018-11-01 PROCEDURE — 84100 ASSAY OF PHOSPHORUS: CPT

## 2018-11-01 PROCEDURE — 81001 URINALYSIS AUTO W/SCOPE: CPT

## 2018-11-01 PROCEDURE — 96375 TX/PRO/DX INJ NEW DRUG ADDON: CPT

## 2018-11-01 PROCEDURE — 83735 ASSAY OF MAGNESIUM: CPT

## 2018-11-01 PROCEDURE — 25000242 PHARM REV CODE 250 ALT 637 W/ HCPCS

## 2018-11-01 PROCEDURE — A4216 STERILE WATER/SALINE, 10 ML: HCPCS

## 2018-11-01 PROCEDURE — 36415 COLL VENOUS BLD VENIPUNCTURE: CPT

## 2018-11-01 PROCEDURE — 85025 COMPLETE CBC W/AUTO DIFF WBC: CPT

## 2018-11-01 PROCEDURE — 63600175 PHARM REV CODE 636 W HCPCS

## 2018-11-01 PROCEDURE — 80048 BASIC METABOLIC PNL TOTAL CA: CPT | Mod: 91

## 2018-11-01 PROCEDURE — 80048 BASIC METABOLIC PNL TOTAL CA: CPT

## 2018-11-01 PROCEDURE — 96368 THER/DIAG CONCURRENT INF: CPT

## 2018-11-01 PROCEDURE — 27000190 HC CPAP FULL FACE MASK W/VALVE

## 2018-11-01 PROCEDURE — 94761 N-INVAS EAR/PLS OXIMETRY MLT: CPT

## 2018-11-01 PROCEDURE — 25000242 PHARM REV CODE 250 ALT 637 W/ HCPCS: Performed by: STUDENT IN AN ORGANIZED HEALTH CARE EDUCATION/TRAINING PROGRAM

## 2018-11-01 PROCEDURE — 87086 URINE CULTURE/COLONY COUNT: CPT

## 2018-11-01 PROCEDURE — 27000221 HC OXYGEN, UP TO 24 HOURS

## 2018-11-01 RX ORDER — DULOXETIN HYDROCHLORIDE 60 MG/1
60 CAPSULE, DELAYED RELEASE ORAL DAILY
Status: DISCONTINUED | OUTPATIENT
Start: 2018-11-01 | End: 2018-11-04 | Stop reason: HOSPADM

## 2018-11-01 RX ORDER — ONDANSETRON 2 MG/ML
4 INJECTION INTRAMUSCULAR; INTRAVENOUS
Status: COMPLETED | OUTPATIENT
Start: 2018-11-01 | End: 2018-11-01

## 2018-11-01 RX ORDER — INSULIN ASPART 100 [IU]/ML
10 INJECTION, SOLUTION INTRAVENOUS; SUBCUTANEOUS
Status: DISCONTINUED | OUTPATIENT
Start: 2018-11-01 | End: 2018-11-01

## 2018-11-01 RX ORDER — PROCHLORPERAZINE EDISYLATE 5 MG/ML
5 INJECTION INTRAMUSCULAR; INTRAVENOUS EVERY 6 HOURS PRN
Status: DISCONTINUED | OUTPATIENT
Start: 2018-11-01 | End: 2018-11-01

## 2018-11-01 RX ORDER — BACLOFEN 10 MG/1
10 TABLET ORAL 2 TIMES DAILY
Status: DISCONTINUED | OUTPATIENT
Start: 2018-11-01 | End: 2018-11-01

## 2018-11-01 RX ORDER — ENOXAPARIN SODIUM 100 MG/ML
40 INJECTION SUBCUTANEOUS EVERY 24 HOURS
Status: DISCONTINUED | OUTPATIENT
Start: 2018-11-01 | End: 2018-11-04 | Stop reason: HOSPADM

## 2018-11-01 RX ORDER — ONDANSETRON 8 MG/1
8 TABLET, ORALLY DISINTEGRATING ORAL EVERY 8 HOURS PRN
Status: DISCONTINUED | OUTPATIENT
Start: 2018-11-01 | End: 2018-11-04 | Stop reason: HOSPADM

## 2018-11-01 RX ORDER — PREDNISONE 20 MG/1
40 TABLET ORAL DAILY
Status: DISCONTINUED | OUTPATIENT
Start: 2018-11-01 | End: 2018-11-01

## 2018-11-01 RX ORDER — THEOPHYLLINE 200 MG/1
200 TABLET, EXTENDED RELEASE ORAL EVERY 12 HOURS
Status: DISCONTINUED | OUTPATIENT
Start: 2018-11-01 | End: 2018-11-01

## 2018-11-01 RX ORDER — INSULIN ASPART 100 [IU]/ML
0-5 INJECTION, SOLUTION INTRAVENOUS; SUBCUTANEOUS
Status: DISCONTINUED | OUTPATIENT
Start: 2018-11-01 | End: 2018-11-01

## 2018-11-01 RX ORDER — SIMVASTATIN 20 MG/1
20 TABLET, FILM COATED ORAL NIGHTLY
Status: DISCONTINUED | OUTPATIENT
Start: 2018-11-01 | End: 2018-11-01

## 2018-11-01 RX ORDER — SODIUM CHLORIDE 0.9 % (FLUSH) 0.9 %
5 SYRINGE (ML) INJECTION
Status: DISCONTINUED | OUTPATIENT
Start: 2018-11-01 | End: 2018-11-04 | Stop reason: HOSPADM

## 2018-11-01 RX ORDER — MAGNESIUM SULFATE HEPTAHYDRATE 40 MG/ML
2 INJECTION, SOLUTION INTRAVENOUS
Status: COMPLETED | OUTPATIENT
Start: 2018-11-01 | End: 2018-11-01

## 2018-11-01 RX ORDER — INSULIN ASPART 100 [IU]/ML
1-10 INJECTION, SOLUTION INTRAVENOUS; SUBCUTANEOUS EVERY 6 HOURS PRN
Status: DISCONTINUED | OUTPATIENT
Start: 2018-11-01 | End: 2018-11-04 | Stop reason: HOSPADM

## 2018-11-01 RX ORDER — IBUPROFEN 200 MG
16 TABLET ORAL
Status: DISCONTINUED | OUTPATIENT
Start: 2018-11-01 | End: 2018-11-04 | Stop reason: HOSPADM

## 2018-11-01 RX ORDER — CEFTRIAXONE 1 G/1
1 INJECTION, POWDER, FOR SOLUTION INTRAMUSCULAR; INTRAVENOUS
Status: DISCONTINUED | OUTPATIENT
Start: 2018-11-01 | End: 2018-11-01

## 2018-11-01 RX ORDER — IPRATROPIUM BROMIDE AND ALBUTEROL SULFATE 2.5; .5 MG/3ML; MG/3ML
3 SOLUTION RESPIRATORY (INHALATION) EVERY 6 HOURS
Status: DISCONTINUED | OUTPATIENT
Start: 2018-11-01 | End: 2018-11-04 | Stop reason: HOSPADM

## 2018-11-01 RX ORDER — IBUPROFEN 200 MG
24 TABLET ORAL
Status: DISCONTINUED | OUTPATIENT
Start: 2018-11-01 | End: 2018-11-04 | Stop reason: HOSPADM

## 2018-11-01 RX ORDER — OLANZAPINE 5 MG/1
10 TABLET, ORALLY DISINTEGRATING ORAL 2 TIMES DAILY PRN
Status: DISCONTINUED | OUTPATIENT
Start: 2018-11-01 | End: 2018-11-04 | Stop reason: HOSPADM

## 2018-11-01 RX ORDER — LOSARTAN POTASSIUM 50 MG/1
100 TABLET ORAL DAILY
Status: DISCONTINUED | OUTPATIENT
Start: 2018-11-01 | End: 2018-11-04 | Stop reason: HOSPADM

## 2018-11-01 RX ORDER — INSULIN ASPART 100 [IU]/ML
15 INJECTION, SOLUTION INTRAVENOUS; SUBCUTANEOUS
Status: DISCONTINUED | OUTPATIENT
Start: 2018-11-02 | End: 2018-11-01

## 2018-11-01 RX ORDER — IPRATROPIUM BROMIDE AND ALBUTEROL SULFATE 2.5; .5 MG/3ML; MG/3ML
3 SOLUTION RESPIRATORY (INHALATION)
Status: COMPLETED | OUTPATIENT
Start: 2018-11-01 | End: 2018-11-01

## 2018-11-01 RX ORDER — CEFTRIAXONE 1 G/1
1 INJECTION, POWDER, FOR SOLUTION INTRAMUSCULAR; INTRAVENOUS
Status: DISCONTINUED | OUTPATIENT
Start: 2018-11-01 | End: 2018-11-04

## 2018-11-01 RX ORDER — MEGESTROL ACETATE 40 MG/1
40 TABLET ORAL 2 TIMES DAILY
Status: DISCONTINUED | OUTPATIENT
Start: 2018-11-01 | End: 2018-11-01

## 2018-11-01 RX ORDER — GABAPENTIN 300 MG/1
300 CAPSULE ORAL NIGHTLY
Status: DISCONTINUED | OUTPATIENT
Start: 2018-11-01 | End: 2018-11-01

## 2018-11-01 RX ORDER — DILTIAZEM HYDROCHLORIDE 180 MG/1
180 CAPSULE, COATED, EXTENDED RELEASE ORAL DAILY
Status: DISCONTINUED | OUTPATIENT
Start: 2018-11-01 | End: 2018-11-04 | Stop reason: HOSPADM

## 2018-11-01 RX ORDER — FLUTICASONE FUROATE AND VILANTEROL 100; 25 UG/1; UG/1
1 POWDER RESPIRATORY (INHALATION) DAILY
Status: DISCONTINUED | OUTPATIENT
Start: 2018-11-01 | End: 2018-11-04 | Stop reason: HOSPADM

## 2018-11-01 RX ORDER — SODIUM CHLORIDE 0.9 % (FLUSH) 0.9 %
3 SYRINGE (ML) INJECTION
Status: DISCONTINUED | OUTPATIENT
Start: 2018-11-01 | End: 2018-11-04 | Stop reason: HOSPADM

## 2018-11-01 RX ORDER — SODIUM CHLORIDE 9 MG/ML
INJECTION, SOLUTION INTRAVENOUS CONTINUOUS
Status: ACTIVE | OUTPATIENT
Start: 2018-11-01 | End: 2018-11-01

## 2018-11-01 RX ORDER — PANTOPRAZOLE SODIUM 40 MG/1
40 TABLET, DELAYED RELEASE ORAL DAILY
Status: DISCONTINUED | OUTPATIENT
Start: 2018-11-01 | End: 2018-11-04 | Stop reason: HOSPADM

## 2018-11-01 RX ORDER — CLOPIDOGREL BISULFATE 75 MG/1
75 TABLET ORAL DAILY
Status: DISCONTINUED | OUTPATIENT
Start: 2018-11-01 | End: 2018-11-04 | Stop reason: HOSPADM

## 2018-11-01 RX ORDER — INSULIN ASPART 100 [IU]/ML
1-10 INJECTION, SOLUTION INTRAVENOUS; SUBCUTANEOUS
Status: DISCONTINUED | OUTPATIENT
Start: 2018-11-01 | End: 2018-11-01

## 2018-11-01 RX ORDER — ATORVASTATIN CALCIUM 20 MG/1
40 TABLET, FILM COATED ORAL DAILY
Status: DISCONTINUED | OUTPATIENT
Start: 2018-11-01 | End: 2018-11-04 | Stop reason: HOSPADM

## 2018-11-01 RX ORDER — GLUCAGON 1 MG
1 KIT INJECTION
Status: DISCONTINUED | OUTPATIENT
Start: 2018-11-01 | End: 2018-11-04 | Stop reason: HOSPADM

## 2018-11-01 RX ORDER — ASPIRIN 81 MG/1
81 TABLET ORAL DAILY
Status: DISCONTINUED | OUTPATIENT
Start: 2018-11-01 | End: 2018-11-04 | Stop reason: HOSPADM

## 2018-11-01 RX ADMIN — OLANZAPINE 10 MG: 10 TABLET, ORALLY DISINTEGRATING ORAL at 04:11

## 2018-11-01 RX ADMIN — INSULIN DETEMIR 20 UNITS: 100 INJECTION, SOLUTION SUBCUTANEOUS at 11:11

## 2018-11-01 RX ADMIN — AZITHROMYCIN MONOHYDRATE 500 MG: 500 INJECTION, POWDER, LYOPHILIZED, FOR SOLUTION INTRAVENOUS at 01:11

## 2018-11-01 RX ADMIN — SODIUM CHLORIDE: 0.9 INJECTION, SOLUTION INTRAVENOUS at 04:11

## 2018-11-01 RX ADMIN — CEFTRIAXONE 2 G: 2 INJECTION, SOLUTION INTRAVENOUS at 03:11

## 2018-11-01 RX ADMIN — IPRATROPIUM BROMIDE AND ALBUTEROL SULFATE 3 ML: .5; 3 SOLUTION RESPIRATORY (INHALATION) at 01:11

## 2018-11-01 RX ADMIN — MAGNESIUM SULFATE IN WATER 2 G: 40 INJECTION, SOLUTION INTRAVENOUS at 10:11

## 2018-11-01 RX ADMIN — INSULIN ASPART 5 UNITS: 100 INJECTION, SOLUTION INTRAVENOUS; SUBCUTANEOUS at 10:11

## 2018-11-01 RX ADMIN — IPRATROPIUM BROMIDE AND ALBUTEROL SULFATE 3 ML: .5; 3 SOLUTION RESPIRATORY (INHALATION) at 12:11

## 2018-11-01 RX ADMIN — SODIUM CHLORIDE 1000 ML: 0.9 INJECTION, SOLUTION INTRAVENOUS at 12:11

## 2018-11-01 RX ADMIN — INSULIN ASPART 5 UNITS: 100 INJECTION, SOLUTION INTRAVENOUS; SUBCUTANEOUS at 08:11

## 2018-11-01 RX ADMIN — ONDANSETRON 4 MG: 2 INJECTION INTRAMUSCULAR; INTRAVENOUS at 01:11

## 2018-11-01 RX ADMIN — INSULIN ASPART 10 UNITS: 100 INJECTION, SOLUTION INTRAVENOUS; SUBCUTANEOUS at 06:11

## 2018-11-01 RX ADMIN — IPRATROPIUM BROMIDE AND ALBUTEROL SULFATE 3 ML: .5; 3 SOLUTION RESPIRATORY (INHALATION) at 07:11

## 2018-11-01 RX ADMIN — PANTOPRAZOLE SODIUM 40 MG: 40 TABLET, DELAYED RELEASE ORAL at 08:11

## 2018-11-01 RX ADMIN — MAGNESIUM SULFATE IN WATER 2 G: 40 INJECTION, SOLUTION INTRAVENOUS at 01:11

## 2018-11-01 RX ADMIN — ASPIRIN 81 MG: 81 TABLET, COATED ORAL at 08:11

## 2018-11-01 RX ADMIN — MAGNESIUM SULFATE IN WATER 2 G: 40 INJECTION, SOLUTION INTRAVENOUS at 06:11

## 2018-11-01 RX ADMIN — ENOXAPARIN SODIUM 40 MG: 100 INJECTION SUBCUTANEOUS at 06:11

## 2018-11-01 RX ADMIN — INSULIN ASPART 5 UNITS: 100 INJECTION, SOLUTION INTRAVENOUS; SUBCUTANEOUS at 09:11

## 2018-11-01 RX ADMIN — CLOPIDOGREL 75 MG: 75 TABLET, FILM COATED ORAL at 08:11

## 2018-11-01 RX ADMIN — ONDANSETRON 8 MG: 8 TABLET, ORALLY DISINTEGRATING ORAL at 10:11

## 2018-11-01 RX ADMIN — LOSARTAN POTASSIUM 100 MG: 50 TABLET ORAL at 08:11

## 2018-11-01 RX ADMIN — DILTIAZEM HYDROCHLORIDE 180 MG: 180 CAPSULE, COATED, EXTENDED RELEASE ORAL at 11:11

## 2018-11-01 RX ADMIN — BACLOFEN 10 MG: 10 TABLET ORAL at 08:11

## 2018-11-01 RX ADMIN — ATORVASTATIN CALCIUM 40 MG: 20 TABLET, FILM COATED ORAL at 11:11

## 2018-11-01 RX ADMIN — CEFTRIAXONE SODIUM 1 G: 1 INJECTION, POWDER, FOR SOLUTION INTRAMUSCULAR; INTRAVENOUS at 09:11

## 2018-11-01 RX ADMIN — Medication 3 ML: at 09:11

## 2018-11-01 RX ADMIN — PREDNISONE 40 MG: 20 TABLET ORAL at 08:11

## 2018-11-01 RX ADMIN — DULOXETINE 60 MG: 60 CAPSULE, DELAYED RELEASE ORAL at 08:11

## 2018-11-01 RX ADMIN — SODIUM CHLORIDE 1000 ML: 0.9 INJECTION, SOLUTION INTRAVENOUS at 10:11

## 2018-11-01 RX ADMIN — MAGNESIUM SULFATE IN WATER 2 G: 40 INJECTION, SOLUTION INTRAVENOUS at 07:11

## 2018-11-01 RX ADMIN — FLUTICASONE FUROATE AND VILANTEROL TRIFENATATE 1 PUFF: 100; 25 POWDER RESPIRATORY (INHALATION) at 10:11

## 2018-11-01 RX ADMIN — INSULIN ASPART 5 UNITS: 100 INJECTION, SOLUTION INTRAVENOUS; SUBCUTANEOUS at 05:11

## 2018-11-01 NOTE — ASSESSMENT & PLAN NOTE
Magnesium 1.0 in ED. s/p 2 g mag sulfate.    - Will give additional 2g mag sulfate IV q2h x 3 doses

## 2018-11-01 NOTE — SUBJECTIVE & OBJECTIVE
Past Medical History:   Diagnosis Date    Asthma     Closed compression fracture of fourth lumbar vertebra     COPD (chronic obstructive pulmonary disease)     Coronary artery disease     Diabetes mellitus     Glaucoma     High cholesterol     Hypertension     Iritis     Pulmonary embolus     Stroke     rt sided weakness.       Past Surgical History:   Procedure Laterality Date    ABDOMINAL SURGERY      APPENDECTOMY N/A 8/26/2018    Procedure: APPENDECTOMY;  Surgeon: Bernadine Melendrez MD;  Location: [facility] OR;  Service: General;  Laterality: N/A;    APPENDECTOMY N/A 8/26/2018    Performed by Bernadine Melendrez MD at [facility] OR    APPENDECTOMY, LAPAROSCOPIC---CONVERTED TO OPEN APPENDECTOMY @0950 N/A 8/26/2018    Performed by Bernadine Melendrez MD at [facility] OR    BACK SURGERY      stimulator    CATARACT EXTRACTION      HYSTERECTOMY      LAPAROSCOPIC APPENDECTOMY N/A 8/26/2018    Procedure: APPENDECTOMY, LAPAROSCOPIC---CONVERTED TO OPEN APPENDECTOMY @0950;  Surgeon: Bernadine Melendrez MD;  Location: [facility] OR;  Service: General;  Laterality: N/A;       Review of patient's allergies indicates:   Allergen Reactions    Ace inhibitors Swelling    Hydralazine analogues     Tetracyclines Swelling    Travatan (with benzalkonium) [travoprost (benzalkonium)]        No current facility-administered medications on file prior to encounter.      Current Outpatient Medications on File Prior to Encounter   Medication Sig    aspirin (ECOTRIN) 81 MG EC tablet Take 1 tablet (81 mg total) by mouth once daily.    clopidogrel (PLAVIX) 75 mg tablet Take 75 mg by mouth once daily.    diltiaZEM (CARDIZEM CD) 240 MG 24 hr capsule Take 1 capsule (240 mg total) by mouth once daily.    DULoxetine (CYMBALTA) 60 MG capsule Take 60 mg by mouth once daily.    furosemide (LASIX) 40 MG tablet Take 40 mg by mouth once daily.     insulin aspart U-100 (NOVOLOG) 100 unit/mL InPn pen Inject 0-5 Units into the skin 4 (four) times  daily before meals and nightly.    insulin aspart U-100 (NOVOLOG) 100 unit/mL InPn pen Inject 12 Units into the skin 3 (three) times daily.    insulin detemir U-100 (LEVEMIR FLEXTOUCH) 100 unit/mL (3 mL) SubQ InPn pen Inject 28 Units into the skin once daily.    losartan (COZAAR) 100 MG tablet Take 100 mg by mouth once daily.     pantoprazole (PROTONIX) 40 MG tablet Take 40 mg by mouth once daily.    predniSONE (DELTASONE) 10 MG tablet Take 10 mg by mouth once daily.     senna-docusate 8.6-50 mg (SENNA WITH DOCUSATE SODIUM) 8.6-50 mg per tablet Take 1 tablet by mouth 2 (two) times daily.    simethicone 80 mg Tab Take 160 mg by mouth every 6 (six) hours as needed for Flatulence.    simvastatin (ZOCOR) 40 MG tablet Take 0.5 tablets (20 mg total) by mouth every evening.    albuterol-ipratropium (DUO-NEB) 2.5 mg-0.5 mg/3 mL nebulizer solution Take 3 mLs by nebulization every 4 (four) hours. Rescue    Lactobacillus acidophilus 1 billion cell Cap Take 1 tablet by mouth 2 (two) times daily.    megestrol (MEGACE) 40 MG Tab Take 40 mg by mouth 2 (two) times daily.    ondansetron (ZOFRAN) 4 MG tablet Take 1 tablet (4 mg total) by mouth every 6 (six) hours as needed.    pyridoxine, vitamin B6, (VITAMIN B-6) 50 MG Tab Take 1 tablet (50 mg total) by mouth once daily.    [DISCONTINUED] acetaminophen (TYLENOL) 325 MG tablet Take 2 tablets (650 mg total) by mouth every 6 (six) hours as needed for Pain.    [DISCONTINUED] cloNIDine (CATAPRES) 0.3 MG tablet Take 1 tablet (0.3 mg total) by mouth 3 (three) times daily.    [DISCONTINUED] ferrous sulfate 325 (65 FE) MG EC tablet Take 1 tablet (325 mg total) by mouth once daily.    [DISCONTINUED] fluticasone-vilanterol (BREO) 100-25 mcg/dose diskus inhaler Inhale 1 puff into the lungs once daily. Controller    [DISCONTINUED] hydrALAZINE (APRESOLINE) 25 MG tablet Take 1 tablet (25 mg total) by mouth every 8 (eight) hours.     Family History     Problem Relation (Age of  Onset)    Cancer Father    Diabetes Brother    Lupus Sister    Multiple sclerosis Mother        Tobacco Use    Smoking status: Never Smoker   Substance and Sexual Activity    Alcohol use: No     Frequency: Never    Drug use: No    Sexual activity: No     Partners: Male     Review of Systems   Constitutional: Positive for activity change, appetite change and fatigue.   Musculoskeletal:        Pain associated with R tib/fib     Objective:     Vital Signs (Most Recent):  Temp: 98.4 °F (36.9 °C) (11/01/18 0302)  Pulse: 104 (11/01/18 0302)  Resp: (!) 22 (11/01/18 0302)  BP: (!) 148/78 (11/01/18 0302)  SpO2: 97 % (11/01/18 0247) Vital Signs (24h Range):  Temp:  [98.4 °F (36.9 °C)-98.5 °F (36.9 °C)] 98.4 °F (36.9 °C)  Pulse:  [] 104  Resp:  [18-32] 22  SpO2:  [93 %-100 %] 97 %  BP: ()/(58-84) 148/78        There is no height or weight on file to calculate BMI.    Physical Exam   Constitutional: She appears well-developed and well-nourished. No distress.   HENT:   Head: Normocephalic and atraumatic.   Eyes: Conjunctivae are normal. No scleral icterus.   Neck: Normal range of motion.   Cardiovascular: Normal rate, regular rhythm, normal heart sounds and intact distal pulses.   Pulmonary/Chest: Effort normal. She has wheezes.   Abdominal: Soft. She exhibits no distension. There is no tenderness.   Musculoskeletal: She exhibits no edema.   R leg in brace   Neurological:   Somnolent, but arousable  Oriented to self only   Skin: Skin is warm and dry.   Nursing note and vitals reviewed.          Significant Labs: All pertinent labs within the past 24 hours have been reviewed.    Significant Imaging: I have reviewed all pertinent imaging results/findings within the past 24 hours.

## 2018-11-01 NOTE — ED PROVIDER NOTES
"Encounter Date: 10/31/2018       History     Chief Complaint   Patient presents with    Altered Mental Status     Pt presents to ED via EMS from home. EMS reports family c/o AMS. Family states pt is "talking out of her head". Pt c/o RLE pain r/t broken tib/rib. EMS reports pt is aaox3.      HPI   64 yo F with a PMH of COPD, HTN, CAD, DM, stroke brought in by EMS with altered mental status. The patient daughter states the patient began "not making sense" and "talking garbled" since yesterday. Denies slurred speech, facial droop, fall or syncope. She had a similar episode of altered mental status in September and was found to have CO2 narcosis requiring BiPAP. The patient does endorse worsening shortness of breath, wheeze, nausea and decreased PO intake.  with EMS. She also recently had a right closed tib/fib fracture and is currently in a ROM splint.   Review of patient's allergies indicates:   Allergen Reactions    Ace inhibitors Swelling    Corticosteroids (glucocorticoids)     Hydralazine analogues     Tetracyclines Swelling    Travatan (with benzalkonium) [travoprost (benzalkonium)]      Past Medical History:   Diagnosis Date    Asthma     Closed compression fracture of fourth lumbar vertebra     COPD (chronic obstructive pulmonary disease)     Coronary artery disease     Diabetes mellitus     Glaucoma     High cholesterol     Hypertension     Iritis     Pulmonary embolus     Stroke     rt sided weakness.     Past Surgical History:   Procedure Laterality Date    ABDOMINAL SURGERY      APPENDECTOMY N/A 8/26/2018    Procedure: APPENDECTOMY;  Surgeon: Bernadine Melendrez MD;  Location: [facility] OR;  Service: General;  Laterality: N/A;    APPENDECTOMY N/A 8/26/2018    Performed by Bernadine Melendrez MD at [facility] OR    APPENDECTOMY, LAPAROSCOPIC---CONVERTED TO OPEN APPENDECTOMY @0950 N/A 8/26/2018    Performed by Bernadine Melendrez MD at [facility] OR    BACK SURGERY      stimulator    CATARACT " EXTRACTION      HYSTERECTOMY      LAPAROSCOPIC APPENDECTOMY N/A 8/26/2018    Procedure: APPENDECTOMY, LAPAROSCOPIC---CONVERTED TO OPEN APPENDECTOMY @0950;  Surgeon: Bernadine Melendrez MD;  Location: [facility];  Service: General;  Laterality: N/A;     Family History   Problem Relation Age of Onset    Cancer Father     Diabetes Brother     Multiple sclerosis Mother     Lupus Sister      Social History     Tobacco Use    Smoking status: Never Smoker   Substance Use Topics    Alcohol use: No     Frequency: Never    Drug use: No     Review of Systems   Constitutional: Positive for fever (subjective ). Negative for chills.   HENT: Negative for congestion and dental problem.    Eyes: Positive for visual disturbance.   Respiratory: Positive for shortness of breath and wheezing. Negative for cough.    Cardiovascular: Negative for chest pain and leg swelling.   Gastrointestinal: Positive for nausea. Negative for abdominal pain, constipation, diarrhea and vomiting.   Genitourinary: Negative for decreased urine volume and dysuria.   Musculoskeletal: Negative for back pain.   Skin: Negative for pallor, rash and wound.   Neurological: Negative for dizziness, syncope, facial asymmetry, speech difficulty, weakness, light-headedness, numbness and headaches.   Psychiatric/Behavioral: Positive for confusion. Negative for agitation.       Physical Exam     Initial Vitals [10/31/18 2215]   BP Pulse Resp Temp SpO2   (!) 121/58 (!) 119 (!) 22 98.5 °F (36.9 °C) 100 %      MAP       --         Physical Exam    Nursing note and vitals reviewed.  Constitutional: She is not diaphoretic. No distress.   HENT:   Head: Normocephalic and atraumatic.   Mouth/Throat: Mucous membranes are not pale, dry and not cyanotic.   Eyes: EOM are normal. Pupils are equal, round, and reactive to light. No scleral icterus.   Neck: Normal range of motion. Neck supple. No JVD present.   Cardiovascular: Normal rate, regular rhythm, normal heart sounds and  intact distal pulses. Exam reveals no gallop and no friction rub.    No murmur heard.  Pulmonary/Chest: No accessory muscle usage. No respiratory distress. She has wheezes. She has no rhonchi. She has no rales. She exhibits no tenderness.   Abdominal: Soft. Bowel sounds are normal. She exhibits no distension and no mass. There is no hepatosplenomegaly. There is generalized tenderness. There is no rigidity, no rebound and no guarding.   3-4 cm mobile nodule in the right lower quadrant without associated redness, warmth or tenderness    Neurological: She is alert and oriented to person, place, and time. She has normal strength. She displays normal reflexes. No cranial nerve deficit or sensory deficit.   Skin: Skin is warm and dry. No rash and no abscess noted. No erythema.         ED Course   Procedures  Labs Reviewed   CBC W/ AUTO DIFFERENTIAL - Abnormal; Notable for the following components:       Result Value    RBC 3.37 (*)     Hemoglobin 8.7 (*)     Hematocrit 28.9 (*)     MCH 25.8 (*)     MCHC 30.1 (*)     RDW 17.2 (*)     Platelets 387 (*)     Immature Grans (Abs) 0.05 (*)     Lymph # 5.5 (*)     All other components within normal limits   COMPREHENSIVE METABOLIC PANEL - Abnormal; Notable for the following components:    Potassium 3.4 (*)     Glucose 231 (*)     BUN, Bld 4 (*)     Total Protein 5.8 (*)     Albumin 2.2 (*)     Anion Gap 7 (*)     All other components within normal limits   MAGNESIUM - Abnormal; Notable for the following components:    Magnesium 1.0 (*)     All other components within normal limits   POCT GLUCOSE - Abnormal; Notable for the following components:    POCT Glucose 225 (*)     All other components within normal limits   ISTAT PROCEDURE - Abnormal; Notable for the following components:    POC TCO2 28 (*)     All other components within normal limits   CULTURE, BLOOD   CULTURE, BLOOD   B-TYPE NATRIURETIC PEPTIDE   TROPONIN I   PHOSPHORUS   LACTIC ACID, PLASMA   URINALYSIS, REFLEX TO  "URINE CULTURE   POCT GLUCOSE MONITORING CONTINUOUS          Imaging Results          X-Ray Chest AP Portable (Final result)  Result time 10/31/18 23:47:53    Final result by Km Larios MD (10/31/18 23:47:53)                 Impression:      No detrimental change when compared with 10/04/2018.      Electronically signed by: Km Larios MD  Date:    10/31/2018  Time:    23:47             Narrative:    EXAMINATION:  XR CHEST AP PORTABLE    CLINICAL HISTORY:  Provided history is "shortness of breath;  ".    TECHNIQUE:  One view of the chest.    COMPARISON:  10/04/2018.    FINDINGS:  Patient is slightly rotated.  Upper abdomen and costophrenic angles are excluded from the field of view.  Cardiac silhouette is stable.  Spinal stimulator device is noted.  Right-sided Port-A-Cath is present with the tip overlying the SVC.  No focal consolidation or detrimental change in lung aeration.                                       APC / Resident Notes:   HO-III MDM  This is an emergent evaluation of a 62 yo F with a PMH of COPD, HTN, CAD, DM, stroke brought in by EMS with altered mental status. Pulse 119 bpm, AF with remainder of vitals within normal limits. Physical exam notable for chronically ill appearing female, who is non-toxic appearing. Dry mucous membranes. Heart tachycardic at 116 bpm, no murmurs, no gallops or rubs. Diffuse expiratory wheezing with decreased breath sounds. No rales or rhonchi. No JVD or peripheral edema. Mild diffuse tenderness to palpitation 2+ peripheral pulses.  DDx includes but is not limited to COPD exacerbation, hypercapnic respiratory failure, PNA, URI, ACS,sepsis, CHF exacerbation, electrolyte abnormality, hypogycemia. Will obtain labs and imaging including CBC, CMP, troponin, BNP, Phos, Mag, lactate, blood and urine cultures, EKG, CXR. Will also provide IVFs, Duoneb treatments, IV solumedrol, IV rocephin and azithromycin for CAP, and pain medication as need. Will continue to monitor " and frequently reassess pending results of labs, treatments and final disposition. Case discussed with Dr. Kelly.      Kena Thomson D.O  [facility] EMERGENCY MEDICINE PGY-3  10:49 PM 10/31/2018     HO-III UPDATE  CBC shows normocytic anemia with a hgb of 8.7/HCT of 28.9, which is improved when compared to previous CBC from 10/12/18.  CMP shows mild hypokalemia with a potassium of 3.4, albumin of 2.2. Magnesium of 1. Lactate, Troponin, BNP unremarkable. CXR shows mild increase of interstitial lung markings without obvious focal opacities, PTX or other abnormalities when compared to previous CXR from 10/4/2018. EKG shows sinus tachycardia with a rate of 118 bpm, leftward axis, normal intervals, non specific t wave inversions in leads aVL, which is unchanged when compared to her previous EKG from 10/4/2018. No ST segment changes. Patient reassessed. Increase breath sounds with expiratory wheezing. Will give another round of Duoneb treatment and IV magnesium. Plan to admit to internal medicine.     Kena Thomson D.O  [facility] EMERGENCY MEDICINE PGY-3  12:38 AM 11/01/2018    HO-III UPDATE  Patient discussed with internal medicine and admitted to their service.     Kena Thomson D.O  [facility] EMERGENCY MEDICINE PGY-3  1:50 AM 11/01/2018                  Clinical Impression:   The primary encounter diagnosis was Hypomagnesemia. A diagnosis of Shortness of breath was also pertinent to this visit.                             Kena Thomson, DO  Resident  11/01/18 8406

## 2018-11-01 NOTE — ED NOTES
Pt is AA&O times four Resp moderate dept Coarse breath sounds auscultated to all lung fields Radial pulses strong and reg Pt is on cardiac monitor and pulse oximetry  Pt has NS at 125 ml per hr per right chest deandre cath per pump  Brace noted on right leg

## 2018-11-01 NOTE — ED NOTES
Med Team 2 contact in regards to UOP.  Patient has not been able to void.  Urinary catheter remains in place.

## 2018-11-01 NOTE — ASSESSMENT & PLAN NOTE
Initial concern for asthma/COPD exacerbation. Per patient's daughter, patient has a history of CO2 narcosis;however, VBG unremarkable. CXR without consolidation. No leukocytosis or lactic acidosis to raise suspicion of reoccurrence of intra abdominal infection. Glucose only mildly elevated. No uremia.     - Will continue treatment of asthma/COPD exacerbation given presence of wheezing on exam   - Azithromycin and prednisone 40 for total of 5 day course  - Possible that this is a presentation of severe dehydration, tachycardia improved with fluids   - Ritchie placed in the ED, no UOP after 2 L IVF (though no NIKHIL on labs)   - Will infuse additional liter overnight  - Zyprexa ODT 10 BID PRN for agitation

## 2018-11-01 NOTE — ED NOTES
2000 aubrey ADA diet arrived Pt started to eat one spoonful and became nauseated Pt started to eat breakfast before CGB or insulin aspart could be given

## 2018-11-01 NOTE — HPI
"62 yo F with a PMH of asthma, HTN, HFpEF, CAD, DM2 on insulin c/p neuropathy, and history of CVA who presented to the ED after daughter called EMS due to altered mental status. The patient's daughter stated the patient began "not making sense" and "talking garbled" since the day prior to presentation. On my interview, patient's speech was still somewhat non-sensical, but was able to answer simple questions. Per patient she has had no appetite and no PO intake for 4-5 days. Of note patient was recently hospitalized following emergent appendectomy that was converted to open surgery and was complicated by surgical site infection. Surgical site grew MRSA, Entercoccus and E. coli. Treated with Vancomycin and Ertapenem. This was complicated by C diff infection, for which patient completed course of PO Vanc. Denies diarrhea, f/c/n/v at presentation. Also has close tib/fib fracture which occurred after recent discharge, currently in splint, being treated conservatively.  "

## 2018-11-01 NOTE — PLAN OF CARE
11/01/18 1513   Discharge Assessment   Assessment Type Discharge Planning Assessment   Confirmed/corrected address and phone number on facesheet? Yes   Assessment information obtained from? Patient   Communicated expected length of stay with patient/caregiver no   Prior to hospitilization cognitive status: Alert/Oriented   Prior to hospitalization functional status: Assistive Equipment   Current cognitive status: Alert/Oriented   Current Functional Status: Assistive Equipment   Facility Arrived From: N/A   Lives With child(nandini), adult   Able to Return to Prior Arrangements yes   Is patient able to care for self after discharge? Unable to determine at this time (comments)   Who are your caregiver(s) and their phone number(s)? Citlaly [phone]   Patient's perception of discharge disposition home health   Readmission Within The Last 30 Days no previous admission in last 30 days   Patient currently being followed by outpatient case management? Yes   If yes, name of outpatient case management following: [facility] outpatient case management   Patient currently receives any other outside agency services? Yes   Name and contact number of agency or person providing [facility]    Is it the patient/care giver preference to resume care with the current outside agency? Yes   Equipment Currently Used at Home wheelchair;oxygen;rollator   Do you have any problems affording any of your prescribed medications? No   Is the patient taking medications as prescribed? yes   Does the patient have transportation home? No   Dialysis Name and Scheduled days N/A   Does the patient receive services at the [facility]? No   Discharge Plan A Home Health   Discharge Plan B Home with family   Patient/Family In Agreement With Plan yes     Extended Emergency Contact Information  Primary Emergency Contact: Citlaly Guadalupe   [facility]  Home Phone: [phone]  Relation:  Daughter  Secondary Emergency Contact: TAURUS AREVALO  Mobile Phone: [phone]  Relation: Daughter  Preferred language: English   needed? No      [id number] - [place] - [address] AT [facility] OF [facility] [address]  [address]  [place] [address]  Phone: [phone] Fax: [phone]    [facility] [id number] - [place] - [address] AT [facility] [address]  [address]  [place] [address]  Phone: [phone] Fax: [phone]    [facility]  [address]  [place] [address]  Phone: [phone] Fax: [phone]      Pt if from [place], [place], pt is currently in town living with her daughter and has been sick with a couple hospitalizations since she has been here, she does not have a PCP in town. She has O2 with Transifex. She has transportation issues due to her currently being wheelchair bound. PT/OT recs pending. CM will continue to follow for discharge planning needs,

## 2018-11-01 NOTE — ASSESSMENT & PLAN NOTE
A1c 7.1 10/4/18. Per med list provided by patient, only taking detemir 20 u BID    - Will give detemir 20 u qHS with LDSSI and Accuchecks, titrate as appropriate  - Diabetic diet

## 2018-11-01 NOTE — ASSESSMENT & PLAN NOTE
- During last admission, patient was significant placement issue   - Has [place] Medicaid which would only approve SNF in [place]; however, patient wished to be near daughter  - PT/OT eval and treat

## 2018-11-01 NOTE — PROGRESS NOTES
"Daughter at bedside and demanding that patient be put on bi pap stating that she gets "co2 psychosis and will start getting combative in about 5 hours".  Daughter said that she gets "like this when she won't wake up" and then starts talking out of her head. Made daughter aware that patient arrived to me at 1430 and was alert and oriented and able to answer all of my questions and the  questions appropriately, although she was a little slurred at times.  The daughter insists that "she was a asleep, I guarantee you".  Called and spoke with IM2 MD covering, Dr. Olivares and informed her of what the daughter said and also that patie'ts blood sugar was 438. Daughter stated that she takeds 16 units plus sliding scale with meals at home.    MD stated she was unable to come talk with the daughter at this time because she is just the covering MD and cannot give r her POC details, that t will have to come from day team tomorrow.  Daughter seemed ok with that as long as bipap and insulin were ordered. Charge nurse made aware of situation. Will continue to monitor.   "

## 2018-11-02 PROBLEM — L89.152 SACRAL DECUBITUS ULCER, STAGE II: Status: ACTIVE | Noted: 2018-11-02

## 2018-11-02 LAB
ANION GAP SERPL CALC-SCNC: 6 MMOL/L
BACTERIA UR CULT: NORMAL
BACTERIA UR CULT: NORMAL
BASOPHILS # BLD AUTO: 0.02 K/UL
BASOPHILS NFR BLD: 0.1 %
BUN SERPL-MCNC: 7 MG/DL
CALCIUM SERPL-MCNC: 8.6 MG/DL
CHLORIDE SERPL-SCNC: 109 MMOL/L
CO2 SERPL-SCNC: 26 MMOL/L
CREAT SERPL-MCNC: 0.9 MG/DL
DIFFERENTIAL METHOD: ABNORMAL
EOSINOPHIL # BLD AUTO: 0 K/UL
EOSINOPHIL NFR BLD: 0 %
ERYTHROCYTE [DISTWIDTH] IN BLOOD BY AUTOMATED COUNT: 17.4 %
EST. GFR  (AFRICAN AMERICAN): >60 ML/MIN/1.73 M^2
EST. GFR  (NON AFRICAN AMERICAN): >60 ML/MIN/1.73 M^2
GLUCOSE SERPL-MCNC: 303 MG/DL
HCT VFR BLD AUTO: 28.2 %
HGB BLD-MCNC: 8.2 G/DL
IMM GRANULOCYTES # BLD AUTO: 0.07 K/UL
IMM GRANULOCYTES NFR BLD AUTO: 0.5 %
LYMPHOCYTES # BLD AUTO: 1 K/UL
LYMPHOCYTES NFR BLD: 7.4 %
MAGNESIUM SERPL-MCNC: 2.3 MG/DL
MCH RBC QN AUTO: 26.1 PG
MCHC RBC AUTO-ENTMCNC: 29.1 G/DL
MCV RBC AUTO: 90 FL
MONOCYTES # BLD AUTO: 0.4 K/UL
MONOCYTES NFR BLD: 2.9 %
NEUTROPHILS # BLD AUTO: 12.3 K/UL
NEUTROPHILS NFR BLD: 89.1 %
NRBC BLD-RTO: 0 /100 WBC
PLATELET # BLD AUTO: 394 K/UL
PMV BLD AUTO: 9.7 FL
POCT GLUCOSE: 114 MG/DL (ref 70–110)
POCT GLUCOSE: 136 MG/DL (ref 70–110)
POCT GLUCOSE: 264 MG/DL (ref 70–110)
POCT GLUCOSE: 283 MG/DL (ref 70–110)
POCT GLUCOSE: 331 MG/DL (ref 70–110)
POTASSIUM SERPL-SCNC: 3.9 MMOL/L
RBC # BLD AUTO: 3.14 M/UL
SODIUM SERPL-SCNC: 141 MMOL/L
WBC # BLD AUTO: 13.8 K/UL

## 2018-11-02 PROCEDURE — 27000221 HC OXYGEN, UP TO 24 HOURS

## 2018-11-02 PROCEDURE — 99900035 HC TECH TIME PER 15 MIN (STAT)

## 2018-11-02 PROCEDURE — G8978 MOBILITY CURRENT STATUS: HCPCS | Mod: CL

## 2018-11-02 PROCEDURE — 25000003 PHARM REV CODE 250: Performed by: STUDENT IN AN ORGANIZED HEALTH CARE EDUCATION/TRAINING PROGRAM

## 2018-11-02 PROCEDURE — 36415 COLL VENOUS BLD VENIPUNCTURE: CPT

## 2018-11-02 PROCEDURE — 63600175 PHARM REV CODE 636 W HCPCS

## 2018-11-02 PROCEDURE — 63600175 PHARM REV CODE 636 W HCPCS: Performed by: STUDENT IN AN ORGANIZED HEALTH CARE EDUCATION/TRAINING PROGRAM

## 2018-11-02 PROCEDURE — 25000242 PHARM REV CODE 250 ALT 637 W/ HCPCS

## 2018-11-02 PROCEDURE — 94640 AIRWAY INHALATION TREATMENT: CPT

## 2018-11-02 PROCEDURE — 94761 N-INVAS EAR/PLS OXIMETRY MLT: CPT

## 2018-11-02 PROCEDURE — 80048 BASIC METABOLIC PNL TOTAL CA: CPT

## 2018-11-02 PROCEDURE — 97535 SELF CARE MNGMENT TRAINING: CPT

## 2018-11-02 PROCEDURE — 11000001 HC ACUTE MED/SURG PRIVATE ROOM

## 2018-11-02 PROCEDURE — 99232 SBSQ HOSP IP/OBS MODERATE 35: CPT | Mod: GC,,, | Performed by: INTERNAL MEDICINE

## 2018-11-02 PROCEDURE — 83735 ASSAY OF MAGNESIUM: CPT

## 2018-11-02 PROCEDURE — G8979 MOBILITY GOAL STATUS: HCPCS | Mod: CK

## 2018-11-02 PROCEDURE — 97161 PT EVAL LOW COMPLEX 20 MIN: CPT

## 2018-11-02 PROCEDURE — 85025 COMPLETE CBC W/AUTO DIFF WBC: CPT

## 2018-11-02 PROCEDURE — 97530 THERAPEUTIC ACTIVITIES: CPT

## 2018-11-02 PROCEDURE — 87040 BLOOD CULTURE FOR BACTERIA: CPT | Mod: 59

## 2018-11-02 PROCEDURE — 97165 OT EVAL LOW COMPLEX 30 MIN: CPT

## 2018-11-02 PROCEDURE — 63600175 PHARM REV CODE 636 W HCPCS: Performed by: INTERNAL MEDICINE

## 2018-11-02 PROCEDURE — 25000003 PHARM REV CODE 250

## 2018-11-02 RX ORDER — INSULIN ASPART 100 [IU]/ML
10 INJECTION, SOLUTION INTRAVENOUS; SUBCUTANEOUS
Status: DISCONTINUED | OUTPATIENT
Start: 2018-11-02 | End: 2018-11-03

## 2018-11-02 RX ORDER — VANCOMYCIN HCL IN 5 % DEXTROSE 1.5G/250ML
1500 PLASTIC BAG, INJECTION (ML) INTRAVENOUS
Status: DISCONTINUED | OUTPATIENT
Start: 2018-11-02 | End: 2018-11-04

## 2018-11-02 RX ADMIN — ASPIRIN 81 MG: 81 TABLET, COATED ORAL at 09:11

## 2018-11-02 RX ADMIN — INSULIN ASPART 10 UNITS: 100 INJECTION, SOLUTION INTRAVENOUS; SUBCUTANEOUS at 09:11

## 2018-11-02 RX ADMIN — INSULIN ASPART 4 UNITS: 100 INJECTION, SOLUTION INTRAVENOUS; SUBCUTANEOUS at 08:11

## 2018-11-02 RX ADMIN — DULOXETINE 60 MG: 60 CAPSULE, DELAYED RELEASE ORAL at 09:11

## 2018-11-02 RX ADMIN — CLOPIDOGREL 75 MG: 75 TABLET, FILM COATED ORAL at 09:11

## 2018-11-02 RX ADMIN — IPRATROPIUM BROMIDE AND ALBUTEROL SULFATE 3 ML: .5; 3 SOLUTION RESPIRATORY (INHALATION) at 07:11

## 2018-11-02 RX ADMIN — VANCOMYCIN HYDROCHLORIDE 1500 MG: 10 INJECTION, POWDER, LYOPHILIZED, FOR SOLUTION INTRAVENOUS at 05:11

## 2018-11-02 RX ADMIN — INSULIN ASPART 4 UNITS: 100 INJECTION, SOLUTION INTRAVENOUS; SUBCUTANEOUS at 01:11

## 2018-11-02 RX ADMIN — DILTIAZEM HYDROCHLORIDE 180 MG: 180 CAPSULE, COATED, EXTENDED RELEASE ORAL at 09:11

## 2018-11-02 RX ADMIN — IPRATROPIUM BROMIDE AND ALBUTEROL SULFATE 3 ML: .5; 3 SOLUTION RESPIRATORY (INHALATION) at 01:11

## 2018-11-02 RX ADMIN — ENOXAPARIN SODIUM 40 MG: 100 INJECTION SUBCUTANEOUS at 05:11

## 2018-11-02 RX ADMIN — IPRATROPIUM BROMIDE AND ALBUTEROL SULFATE 3 ML: .5; 3 SOLUTION RESPIRATORY (INHALATION) at 12:11

## 2018-11-02 RX ADMIN — ATORVASTATIN CALCIUM 40 MG: 20 TABLET, FILM COATED ORAL at 09:11

## 2018-11-02 RX ADMIN — IPRATROPIUM BROMIDE AND ALBUTEROL SULFATE 3 ML: .5; 3 SOLUTION RESPIRATORY (INHALATION) at 11:11

## 2018-11-02 RX ADMIN — INSULIN ASPART 3 UNITS: 100 INJECTION, SOLUTION INTRAVENOUS; SUBCUTANEOUS at 05:11

## 2018-11-02 RX ADMIN — CEFTRIAXONE SODIUM 1 G: 1 INJECTION, POWDER, FOR SOLUTION INTRAMUSCULAR; INTRAVENOUS at 10:11

## 2018-11-02 RX ADMIN — LOSARTAN POTASSIUM 100 MG: 50 TABLET ORAL at 09:11

## 2018-11-02 RX ADMIN — FLUTICASONE FUROATE AND VILANTEROL TRIFENATATE 1 PUFF: 100; 25 POWDER RESPIRATORY (INHALATION) at 09:11

## 2018-11-02 RX ADMIN — PANTOPRAZOLE SODIUM 40 MG: 40 TABLET, DELAYED RELEASE ORAL at 09:11

## 2018-11-02 RX ADMIN — INSULIN ASPART 10 UNITS: 100 INJECTION, SOLUTION INTRAVENOUS; SUBCUTANEOUS at 05:11

## 2018-11-02 RX ADMIN — IPRATROPIUM BROMIDE AND ALBUTEROL SULFATE 3 ML: .5; 3 SOLUTION RESPIRATORY (INHALATION) at 06:11

## 2018-11-02 NOTE — ASSESSMENT & PLAN NOTE
Initial concern for asthma/COPD exacerbation. Per patient's daughter, patient has a history of CO2 narcosis;however, VBG unremarkable. CXR without consolidation. No leukocytosis or lactic acidosis to raise suspicion of reoccurrence of intra abdominal infection. Glucose only mildly elevated. No uremia.     - Initially treated asthma/COPD exacerbation given presence of wheezing on exam with azithromycin and prednisone. This was discontinued due to resolution of wheezing, pt breathing and satting well on NC.   - Initial tachycardia improved with fluids  - Nieves placed in the ED, no UOP after 2 L IVF (though no NIKHIL on labs)- discontinued nieves.   - Zyprexa ODT 10 BID PRN for agitation  - CT head negative. CXR, KUB negative for acute abnormality.   - UA showing UTI- treating with Ceftriaxone 1g IV q24  - Mental status improved with UTI tx and hydration.   - Blood cx ngtd  - F/u urine cx

## 2018-11-02 NOTE — PROGRESS NOTES
RN Proactive Rounding Note  Time of Visit:     Admit Date: 10/31/2018  LOS: 1  Code Status: Full Code   Date of Visit: 2018  : 1955  Age: 63 y.o.  Sex: female  Race: Black or   Bed: 1105/1105 A:   MRN: [id number]  Was the patient discharged from an ICU this admission? no   Was the patient discharged from a PACU within last 24 hours?  no  Did the patient receive conscious sedation/general anesthesia in last 24 hours?  no  Was the patient in the ED within the past 24 hours?  no  Was the patient started on NIPPV within the past 24 hours?  no  Attending Physician: Kendrick Celis MD  Primary Service: [facility] MED 2      ASSESSMENT:     Abnormal Vital Signs:  Clinical Issues: Metabolic     INTERVENTIONS/ RECOMMENDATIONS:     Asked to see pt by CN d/t persistently elevated CBG since 1700. Since  pt received total of 15 units sq insulin and 20 units of levemir. Last sq dose administered at 2300. Spoke to MD Olivares regarding plan for controlling hyperglycemia. Will recheck BG at 0045, if not improved, pt may require insulin infusion.      Discussed plan of care with Richard HUNT.    PHYSICIAN ESCALATION:     Yes/No  yes    Orders received and case discussed with Dr. Olivares .    Disposition: Remain in room 1105.    FOLLOW-UP/CONTINGENCY:     Call back the Rapid Response Nurse at [phone] for additional questions or concerns.

## 2018-11-02 NOTE — PT/OT/SLP EVAL
Physical Therapy Evaluation    Patient Name:  Sheryl Martines   MRN:  [id number]    Recommendations:     Discharge Recommendations:  ([facility] SNF)   Discharge Equipment Recommendations: none   Barriers to discharge: Inaccessible home and Decreased caregiver support    Assessment:     Sheyrl Martines is a 63 y.o. female admitted with a medical diagnosis of Acute metabolic encephalopathy.  She presents with the following impairments/functional limitations:  impaired endurance, gait instability, decreased coordination, weakness, impaired functional mobilty, decreased lower extremity function, decreased upper extremity function, impaired self care skills, pain, orthopedic precautions, impaired muscle length Patient tolerated evaluation and treatment  fairly well. Patient limited at this time by NWB RLE and req max/total A for stand pivot transfer while maintaining precautions for WB. Pt states PTA she was transferring to w/c with her daughter daily but today appeared more difficult.   Patient will continue to require skilled PT services to address the above impairments to return to prior level of function as independent as possible. Discharge recommendation  to short stay skilled nursing facility  to maximize functional mobility, safety and decrease caregiver burden prior to returning home.     .    Rehab Prognosis:  good; patient would benefit from acute skilled PT services to address these deficits and reach maximum level of function.      Recent Surgery: * No surgery found *      Plan:     During this hospitalization, patient to be seen 4 x/week to address the above listed problems via gait training, therapeutic activities, therapeutic exercises, neuromuscular re-education, wheelchair management/training  · Plan of Care Expires:  12/01/18   Plan of Care Reviewed with: patient, daughter    Subjective     Communicated with RN  prior to session.  Patient found supine upon PT entry to room, agreeable to evaluation.       Chief Complaint: none  Patient comments/goals: to go home  Pain/Comfort:  · Pain Rating 1: 0/10  · Pain Rating Post-Intervention 1: 0/10    Patients cultural, spiritual, Tenriism conflicts given the current situation:      Living Environment:  Pt [facility] in a [facility] with 1 SHANNON.   Prior to admission, patients level of function was for the past 4 weeks pt req dependene A for ADLs and max A for stand pivot to chair. Pt states she was not leaving home or self propelling her w/c. .  Patient has the following equipment: wheelchair, rollator, oxygen, shower chair.  DME owned (not currently used): none.  Upon discharge, patient will have assistance from daughter.    Objective:     Patient found with: Shawna, telemetry     General Precautions: Standard, fall   Orthopedic Precautions:RLE non weight bearing   Braces: Knee immobilizer     Exams:  · Cognitive Exam:  Patient is oriented to Person, Place, Time and Situation  · Fine Motor Coordination: -       Intact  · Gross Motor Coordination:  WFL  · Postural Exam:  Patient presented with the following abnormalities: -       Rounded shoulders  · -       Forward head  · Sensation: -       Intact  · Skin Integrity/Edema:  -       Skin integrity: Visible skin intact, Thin and Dry  · RLE ROM: WFL except knee not assessed 2/2 immobilizer brace  · RLE Strength: WFL except knee not assessed 2/2 immobilizer brace  · LLE ROM: WFL  · LLE Strength: WFL   ·  B feet rest in equina varus position but pt able to actively return to neutral    Functional Mobility:  · Bed Mobility:  Supine to Sit: moderate assistance  · Sit to Supine: minimum assistance  · Transfers:  Sit to Stand:  maximal assistance with no AD  · Bed to Chair: maximal assistance with  no AD  using  Stand Pivot  · Balance: max A for static standing    AM-PAC 6 CLICK MOBILITY  Total Score:12       Therapeutic Activities and Exercises:  · PT transferred pt to chair with max A stand pivot  ·  Pt with decreased forward lean  limiting appropriate weight bearing through LLE  · Pt with increased LLE ext limiting appropriate WB to assist with safe transfer.   · PT returned and transferred pt from chair to bed with same A level and deficits as previous transfer.     ·  Whiteboard updated in patients room to current assistance level  · All of patients questions were answered within the scope of PT    Patient education  · Patient educated on the role of PT and POC  · Patient educated on importance  activity while in the hosptial per tolerance for improved endurance and to limit deconditioning   · Patient educated on safe transfers with nursing as appropriate  · Patient educated on energy conservation, pursed lip breathing  · Patient educated on proper transfer mechanics and safety          Patient left up in chair with all lines intact and call button in reach   PT returned and pt left in bed with all lines intact and RN notified that pt needed to use the restroom.    GOALS:   Multidisciplinary Problems     Physical Therapy Goals        Problem: Physical Therapy Goal    Goal Priority Disciplines Outcome Goal Variances Interventions   Physical Therapy Goal     PT, PT/OT Ongoing (interventions implemented as appropriate)     Description:  Goals to be met by: 18     Patient will increase functional independence with mobility by performin. Supine to sit with CGA.  2. Sit to supine with CGA.   3. Sit to stand transfer with mod A  4. Bed to chair transfer with Moderate Assistance using Rolling Walker or LRD.   5. Lower extremity exercise program x20 reps per handout, with ( I).                         History:     Past Medical History:   Diagnosis Date    Asthma     Closed compression fracture of fourth lumbar vertebra     COPD (chronic obstructive pulmonary disease)     Coronary artery disease     Diabetes mellitus     Glaucoma     High cholesterol     Hypertension     Iritis     Pulmonary embolus     Stroke     rt sided  weakness.       Past Surgical History:   Procedure Laterality Date    ABDOMINAL SURGERY      APPENDECTOMY N/A 8/26/2018    Procedure: APPENDECTOMY;  Surgeon: Bernadine Melendrez MD;  Location: [facility] OR;  Service: General;  Laterality: N/A;    APPENDECTOMY N/A 8/26/2018    Performed by Bernadine Melendrez MD at [facility] OR    APPENDECTOMY, LAPAROSCOPIC---CONVERTED TO OPEN APPENDECTOMY @0950 N/A 8/26/2018    Performed by [facility]ine [facility]rez MD at [facility] OR    BACK SURGERY      stimulator    CATARACT EXTRACTION      HYSTERECTOMY      LAPAROSCOPIC APPENDECTOMY N/A 8/26/2018    Procedure: APPENDECTOMY, LAPAROSCOPIC---CONVERTED TO OPEN APPENDECTOMY @0950;  Surgeon: Bernadine Melendrez MD;  Location: [facility] OR;  Service: General;  Laterality: N/A;       Clinical Decision Making:     History  Co-morbidities and personal factors that may impact the plan of care Examination  Body Structures and Functions, activity limitations and participation restrictions that may impact the plan of care Clinical Presentation   Decision Making/ Complexity Score   Co-morbidities:   [] Time since onset of injury / illness / exacerbation  [x] Status of current condition  []Patient's cognitive status and safety concerns    [x] Multiple Medical Problems (see med hx)  Personal Factors:   [] Patient's age  [x] Prior Level of function   [] Patient's home situation (environment and family support)  [x] Patient's level of motivation  [x] Expected progression of patient      HISTORY:(criteria)    [] 88375 - no personal factors/history    [x] 55721 - has 1-2 personal factor/comorbidity     [] 60426 - has >3 personal factor/comorbidity     Body Regions:  [] Objective examination findings  [] Head     []  Neck  [] Trunk   [x] Upper Extremity  [x] Lower Extremity    Body Systems:  [] For communication ability, affect, cognition, language, and learning style: the assessment of the ability to make needs known, consciousness, orientation (person, place,  and time), expected emotional /behavioral responses, and learning preferences (eg, learning barriers, education  needs)  [x] For the neuromuscular system: a general assessment of gross coordinated movement (eg, balance, gait, locomotion, transfers, and transitions) and motor function  (motor control and motor learning)  [x] For the musculoskeletal system: the assessment of gross symmetry, gross range of motion, gross strength, height, and weight  [] For the integumentary system: the assessment of pliability(texture), presence of scar formation, skin color, and skin integrity  [] For cardiovascular/pulmonary system: the assessment of heart rate, respiratory rate, blood pressure, and edema     Activity limitations:    [] Patient's cognitive status and saf ety concerns          [] Status of current condition      [x] Weight bearing restriction  [] Cardiopulmunary Restriction    Participation Restrictions:   [] Goals and goal agreement with the patient     [] Rehab potential (prognosis) and probable outcome      Examination of Body System: (criteria)    [] 47221 - addressing 1-2 elements    [x] 25239 - addressing a total of 3 or more elements     [] 39660 -  Addressing a total of 4 or more elements         Clinical Presentation: (criteria)  Stable - 73771     On examination of body system using standardized tests and measures patient presents with 1-2 elements from any of the following: body structures and functions, activity limitations, and/or participation restrictions.  Leading to a clinical presentation that is considered stable and/or uncomplicated                              Clinical Decision Making  (Eval Complexity):  Choose One     Time Tracking:     PT Received On: 11/02/18  PT Start Time: 1249     PT Stop Time: 1308   PT return time: 1400  PT end time: 1414  PT Total Time (min): 19 min , 14 min    Billable Minutes: Evaluation 19 min and Therapeutic Activity 14 min      Lan Brasher PT  11/02/2018

## 2018-11-02 NOTE — NURSING
Rapid Response called per Kierra HUNT for evaluation of pt. Spoke with Rapid Response nurse, Jay. Plan is to re-take blood sugar around 0100 to give long term insulin a chance to work. Pt presently alert, orient and attempting to void.

## 2018-11-02 NOTE — PLAN OF CARE
Problem: Patient Care Overview  Goal: Plan of Care Review  Rechecked pt Blood sugar- 458.  MD paged, awaiting call back    Jeana Olivares stated she will change sliding scale to moderate and would like the correction dose given by night RN for BG of 458.  Endorsed to night shift RNBenny

## 2018-11-02 NOTE — PT/OT/SLP EVAL
Occupational Therapy   Evaluation    Name: Sheryl Martines  MRN: [id number]  Admitting Diagnosis:  Acute metabolic encephalopathy      Recommendations:     Discharge Recommendations: nursing facility, skilled(short stay (likely to progress to home with home health))  Discharge Equipment Recommendations:  none  Barriers to discharge:       History:     Occupational Profile:  Living Environment: Pt lives with daughter who works nearby home and is able to check on her mid day. They live in a [facility] with 1 SHANNON, tub shower (with sliding glass doors-not able to use)  Previous level of function: Pt requires assist with dressing, bathing, transfers due to NWB status and inability to ambulate due to orthopedic precautions with UE comorbidities.   Roles and Routines: mother. Routine: daughter assists her up to w/c and with dressing/toileting in AM, checks on her midday and changes diaper. Daughter returns to work and pt is in w/c or bed. She is not able to propel w/c at home.  Equipment Used at Home:  wheelchair, oxygen  Assistance upon Discharge: daughter can assist but she is working and pt is at home for ~4 hours max at a time.    Past Medical History:   Diagnosis Date    Asthma     Closed compression fracture of fourth lumbar vertebra     COPD (chronic obstructive pulmonary disease)     Coronary artery disease     Diabetes mellitus     Glaucoma     High cholesterol     Hypertension     Iritis     Pulmonary embolus     Stroke     rt sided weakness.       Past Surgical History:   Procedure Laterality Date    ABDOMINAL SURGERY      APPENDECTOMY N/A 8/26/2018    Procedure: APPENDECTOMY;  Surgeon: Bernadine Melendrez MD;  Location: [facility] OR;  Service: General;  Laterality: N/A;    APPENDECTOMY N/A 8/26/2018    Performed by Bernadine Melendrez MD at [facility] OR    APPENDECTOMY, LAPAROSCOPIC---CONVERTED TO OPEN APPENDECTOMY @0950 N/A 8/26/2018    Performed by [facility]ine [facility]rez MD at [facility] OR    BACK SURGERY       stimulator    CATARACT EXTRACTION      HYSTERECTOMY      LAPAROSCOPIC APPENDECTOMY N/A 8/26/2018    Procedure: APPENDECTOMY, LAPAROSCOPIC---CONVERTED TO OPEN APPENDECTOMY @0950;  Surgeon: Bernadine Melendrez MD;  Location: [facility];  Service: General;  Laterality: N/A;       Subjective     Chief Complaint: during transfer pt c/o not being able to keep weight off of R LE  Patient/Family Comments/goals: Pt agreeable to progressing towards independence with ADLs and functional mobility.     Pain/Comfort:  · Pain Rating Post-Intervention 1: other (see comments)(did not rate)    Patients cultural, spiritual, Synagogue conflicts given the current situation: none    Objective:     Communicated with: RN prior to session.  Patient found all lines intact and oxygen, pulse ox (continuous), telemetry, PureWick upon OT entry to room.    General Precautions: Standard, fall   Orthopedic Precautions:RLE non weight bearing   Braces: Knee immobilizer     Occupational Performance:    Bed Mobility:    · Patient completed Scooting/Bridging with maximal assistance  · Patient completed Supine to Sit with maximal assistance  · Patient completed Sit to Supine with maximal assistance    Functional Mobility/Transfers:  · Patient completed Sit <> Stand Transfer with maximal assistance  with  no assistive device   · Patient completed Bed <> Chair Transfer using Stand Pivot technique with total assistance with no assistive device  · Functional Mobility: PT performed stand pivot transfer with total assist towards R side. Difficult transfer. OT assisted at hips.     Activities of Daily Living:  · Feeding:  minimum assistance (for set up and positioning just right)  · Lower Body Dressing: total assistance socks  · Toileting: total assistance bed level    Cognitive/Visual Perceptual:  Cognitive/Psychosocial Skills:     -       Oriented to: Person and Situation   -       Follows Commands/attention:Follows one-step commands  Visual/Perceptual:     "  -Intact         Physical Exam:  Balance: -       poor, sitting balance-CGA-SBA  Upper Extremity Range of Motion:     -       Right Upper Extremity: WFL    -       Left Upper Extremity: WFL (has a tear ROM limited to ~90 degrees)  Upper Extremity Strength:  DNT due to pt pain with AROM Atleast 3/5 at shoulders   Neurological: -       Intact    AMPAC 6 Click ADL:  AMPAC Total Score: 10    Treatment & Education:  Educated on role of OT  Discussed concerns for d/c and possible need for DME.  Discussed level of function and daughter ability to provide this level of assist.   Education:    Patient left up in chair with all lines intact. PT agreeable to return pt to bed later in the day due to level of assist needed.    Assessment:     Sheryl Martines is a 63 y.o. female with a medical diagnosis of Acute metabolic encephalopathy.  She presents with the following performance deficits affecting function: impaired functional mobilty, impaired self care skills, impaired endurance, orthopedic precautions, pain, decreased lower extremity function, decreased upper extremity function, impaired balance, gait instability.      Rehab Prognosis: Fair (due to PLOF with orthopedic precautions for atleast 2 more weeks) ; patient would benefit from acute skilled OT services to address these deficits and reach maximum level of function.         Clinical Decision Makin.  OT Low:  "Pt evaluation falls under low complexity for evaluation coding due to performance deficits noted in 1-3 areas as stated above and 0 co-morbities affecting current functional status. Data obtained from problem focused assessments. No modifications or assistance was required for completion of evaluation. Only brief occupational profile and history review completed."     Plan:     Patient to be seen 3 x/week to address the above listed problems via self-care/home management, therapeutic activities, therapeutic exercises, neuromuscular re-education  · Plan " of Care Expires: 12/02/18  · Plan of Care Reviewed with: patient    This Plan of care has been discussed with the patient who was involved in its development and understands and is in agreement with the identified goals and treatment plan    GOALS:   Multidisciplinary Problems     Occupational Therapy Goals        Problem: Occupational Therapy Goal    Goal Priority Disciplines Outcome Interventions   Occupational Therapy Goal     OT, PT/OT Ongoing (interventions implemented as appropriate)    Description:  Pt will perform oral care sitting in w/c or chair with set up assist.  Pt will self feeding with set up assist while seated upright.  Pt will sit EOB for 10 minutes without LOB during functional activity.                     Time Tracking:     OT Date of Treatment: 11/02/18  OT Start Time: 1240  OT Stop Time: 1310  OT Total Time (min): 30 min    Billable Minutes:Evaluation 15  Self Care/Home Management 15    NAVI Pratt  11/2/2018

## 2018-11-02 NOTE — PROGRESS NOTES
"[facility] Hospital Medicine  Progress Note    Patient Name: Sheryl Martines  MRN: [id number]  Patient Class: IP- Inpatient   Admission Date: 10/31/2018  Length of Stay: 1 days  Attending Physician: Kendrick Celis MD  Primary Care Provider: [facility] Medicine Team: [facility] [place] 2 Omid Castrejon MD    Subjective:     Principal Problem:Acute metabolic encephalopathy    HPI:  64 yo F with a PMH of asthma, HTN, HFpEF, CAD, DM2 on insulin c/p neuropathy, and history of CVA who presented to the ED after daughter called EMS due to altered mental status. The patient's daughter stated the patient began "not making sense" and "talking garbled" since the day prior to presentation. On my interview, patient's speech was still somewhat non-sensical, but was able to answer simple questions. Per patient she has had no appetite and no PO intake for 4-5 days. Of note patient was recently hospitalized following emergent appendectomy that was converted to open surgery and was complicated by surgical site infection. Surgical site grew MRSA, Entercoccus and E. coli. Treated with Vancomycin and Ertapenem. This was complicated by C diff infection, for which patient completed course of PO Vanc. Denies diarrhea, f/c/n/v at presentation. Also has close tib/fib fracture which occurred after recent discharge, currently in splint, being treated conservatively.    Hospital Course:  Admitted to IM2. Found to have UTI, tx with Ceftriaxone. CT head negative. CXR, KUB negative for acute abnormality. Mental status improved with UTI tx and hydration. Started on insulin 20U qd, BG up in 400s, steroids that she got in ED likely contributing to hyperglycemia. Adjusting insulin regimen to 20U bid, 10U tidwm (home dose is 20u bid, 16u tidwm).     Interval History:   Mental status improved since yesterday. BG up to 474 overnight. Adjusting insulin regimen to 20U bid, 10U tidwm. Pt without complaints this AM. " Denies fever, chills, cough, SOB. Slept with bipap.     Review of Systems   Constitutional: Positive for fatigue. Negative for chills and fever.   HENT: Negative for sore throat.    Eyes: Negative for visual disturbance.   Respiratory: Negative for cough and shortness of breath.    Cardiovascular: Negative for chest pain.   Gastrointestinal: Negative for abdominal pain, diarrhea, nausea and vomiting.   Endocrine: Negative for polyuria.   Genitourinary: Negative for dysuria.   Musculoskeletal: Negative for back pain.   Skin: Negative for rash and wound.   Neurological: Negative for headaches.   Psychiatric/Behavioral: Negative for agitation, behavioral problems and confusion.     Objective:     Vital Signs (Most Recent):  Temp: 97.9 °F (36.6 °C) (11/02/18 0500)  Pulse: 88 (11/02/18 0903)  Resp: 20 (11/02/18 0903)  BP: (!) 152/73 (11/02/18 0500)  SpO2: 98 % (11/02/18 0702) Vital Signs (24h Range):  Temp:  [96 °F (35.6 °C)-98.5 °F (36.9 °C)] 97.9 °F (36.6 °C)  Pulse:  [] 88  Resp:  [16-28] 20  SpO2:  [93 %-100 %] 98 %  BP: (125-174)/(65-93) 152/73     Weight: 77.9 kg (171 lb 11.8 oz)  Body mass index is 33.54 kg/m².    Intake/Output Summary (Last 24 hours) at 11/2/2018 1017  Last data filed at 11/2/2018 0700  Gross per 24 hour   Intake 2610 ml   Output 50 ml   Net 2560 ml      Physical Exam   Constitutional: She is oriented to person, place, and time. She appears well-developed and well-nourished. No distress.   HENT:   Head: Normocephalic and atraumatic.   Eyes: Conjunctivae are normal. No scleral icterus.   Neck: Normal range of motion.   Cardiovascular: Normal rate, regular rhythm, normal heart sounds and intact distal pulses.   Pulmonary/Chest: Effort normal and breath sounds normal. No respiratory distress. She has no wheezes.   Abdominal: Soft. She exhibits no distension. There is no tenderness.   Musculoskeletal: She exhibits no edema or tenderness.   R leg in brace   Neurological: She is alert and  oriented to person, place, and time.   Skin: Skin is warm and dry.   Psychiatric: She has a normal mood and affect. Her behavior is normal.   Nursing note and vitals reviewed.    Significant Labs:   A1C:   Recent Labs   Lab 09/26/18  0401 10/02/18  0522 10/04/18  0302   HGBA1C 6.4* 7.1* 7.1*     ABGs:   Recent Labs   Lab 10/31/18  2314   PH 7.438   PCO2 39.9   HCO3 27.0   POCSATURATED 97   BE 3     Blood Culture:   Recent Labs   Lab 10/31/18  2312 10/31/18  2336   LABBLOO No Growth to date  No Growth to date No Growth to date  No Growth to date     BMP:   Recent Labs   Lab 11/02/18  0647   *      K 3.9      CO2 26   BUN 7*   CREATININE 0.9   CALCIUM 8.6*   MG 2.3     CBC:   Recent Labs   Lab 10/31/18  2257 11/01/18  0432 11/02/18  0647   WBC 11.90 10.05 13.80*   HGB 8.7* 7.9* 8.2*   HCT 28.9* 26.3* 28.2*   * 353* 394*     CMP:   Recent Labs   Lab 10/31/18  2257 11/01/18  0432 11/01/18  2206 11/02/18  0647    138 139 141   K 3.4* 3.6 4.1 3.9    104 106 109   CO2 28 22* 23 26   * 349* 474* 303*   BUN 4* 5* 8 7*   CREATININE 1.0 1.0 1.1 0.9   CALCIUM 9.2 8.4* 8.5* 8.6*   PROT 5.8*  --   --   --    ALBUMIN 2.2*  --   --   --    BILITOT 0.8  --   --   --    ALKPHOS 103  --   --   --    AST 30  --   --   --    ALT 23  --   --   --    ANIONGAP 7* 12 10 6*   EGFRNONAA >60.0 >60.0 53.6* >60.0     Cardiac Markers:   Recent Labs   Lab 10/31/18  2257   BNP 31     Magnesium:   Recent Labs   Lab 10/31/18  2257 11/01/18  0432 11/02/18  0647   MG 1.0* 1.2* 2.3     POCT Glucose:   Recent Labs   Lab 11/02/18  0107 11/02/18  0545 11/02/18  0811   POCTGLUCOSE 331* 283* 264*     Troponin:   Recent Labs   Lab 10/31/18  2257   TROPONINI 0.024     Urine Culture: No results for input(s): LABURIN in the last 48 hours.  Urine Studies:   Recent Labs   Lab 11/01/18  0700   COLORU Yellow   APPEARANCEUA Cloudy*   PHUR 5.0   SPECGRAV 1.010   PROTEINUA 1+*   GLUCUA 2+*   KETONESU Negative   BILIRUBINUA  Negative   OCCULTUA Negative   NITRITE Negative   LEUKOCYTESUR 3+*   RBCUA 5*   WBCUA 24*   BACTERIA Rare   SQUAMEPITHEL 8   HYALINECASTS 3*       Significant Imaging: I have reviewed all pertinent imaging results/findings within the past 24 hours.    Assessment/Plan:      * Acute metabolic encephalopathy    Initial concern for asthma/COPD exacerbation. Per patient's daughter, patient has a history of CO2 narcosis;however, VBG unremarkable. CXR without consolidation. No leukocytosis or lactic acidosis to raise suspicion of reoccurrence of intra abdominal infection. Glucose only mildly elevated. No uremia.     - Initially treated asthma/COPD exacerbation given presence of wheezing on exam with azithromycin and prednisone. This was discontinued due to resolution of wheezing, pt breathing and satting well on NC.   - Initial tachycardia improved with fluids  - Nieves placed in the ED, no UOP after 2 L IVF (though no NIKHIL on labs)- discontinued nieves.   - Zyprexa ODT 10 BID PRN for agitation  - CT head negative. CXR, KUB negative for acute abnormality.   - UA showing UTI- treating with Ceftriaxone 1g IV q24  - Mental status improved with UTI tx and hydration.   - Blood cx ngtd  - F/u urine cx     Insulin dependent diabetes mellitus    A1c 7.1 10/4/18. Per med list provided by patient, only taking detemir 20 u BID    - Started with detemir 20 u qHS with LDSSI and Accuchecks. BG up to 474; steroids given in ED likely contributing to hyperglycemia. Increased insulin regimen to 20u bid detemir and aspart 10u tidwm (home dose is 20u bid, 16u tidwm).   - Diabetic diet     Discharge planning issues    - During last admission, patient was significant placement issue   - Has [place] Medicaid which would only approve SNF in [place]; however, patient wished to be near daughter  - PT/OT eval and treat     Dehydration    - Per principle problem       Hypomagnesemia    Magnesium 1.0 in ED. s/p 2 g mag sulfate.  - Gave additional 2g mag  sulfate IV q2h x 3 doses     History of CVA (cerebrovascular accident)    - Continue ASA, plavix and statin       Closed fracture of right tibia and fibula    - splint in place to RLE     Gastroesophageal reflux disease without esophagitis    - Pantoprazole qD       (HFpEF) heart failure with preserved ejection fraction    - Continue losartan  - Strict I&O and daily weights  - Cardiac diet       CAD (coronary artery disease)    - Continue ASA and statin         VTE Risk Mitigation (From admission, onward)        Ordered     enoxaparin injection 40 mg  Daily      11/01/18 0333     Place sequential compression device  Until discontinued      11/01/18 0333     IP VTE HIGH RISK PATIENT  Once      11/01/18 0333        Omid Castrejon MD  Department of Hospital Medicine   [facility]

## 2018-11-02 NOTE — PLAN OF CARE
Problem: Physical Therapy Goal  Goal: Physical Therapy Goal  Goals to be met by: 18     Patient will increase functional independence with mobility by performin. Supine to sit with CGA.  2. Sit to supine with CGA.   3. Sit to stand transfer with mod A  4. Bed to chair transfer with Moderate Assistance using Rolling Walker or LRD.   5. Lower extremity exercise program x20 reps per handout, with ( I).       Outcome: Ongoing (interventions implemented as appropriate)  Patient evaluated today. All goals established are appropriate for patient progression at this time.   Lan Brasher PT, DPT  2018  Pager: [phone]

## 2018-11-02 NOTE — SUBJECTIVE & OBJECTIVE
Interval History:   Mental status improved since yesterday. BG up to 474 overnight. Adjusting insulin regimen to 20U bid, 10U tidwm. Pt without complaints this AM. Denies fever, chills, cough, SOB. Slept with bipap.     Review of Systems   Constitutional: Positive for fatigue. Negative for chills and fever.   HENT: Negative for sore throat.    Eyes: Negative for visual disturbance.   Respiratory: Negative for cough and shortness of breath.    Cardiovascular: Negative for chest pain.   Gastrointestinal: Negative for abdominal pain, diarrhea, nausea and vomiting.   Endocrine: Negative for polyuria.   Genitourinary: Negative for dysuria.   Musculoskeletal: Negative for back pain.   Skin: Negative for rash and wound.   Neurological: Negative for headaches.   Psychiatric/Behavioral: Negative for agitation, behavioral problems and confusion.     Objective:     Vital Signs (Most Recent):  Temp: 97.9 °F (36.6 °C) (11/02/18 0500)  Pulse: 88 (11/02/18 0903)  Resp: 20 (11/02/18 0903)  BP: (!) 152/73 (11/02/18 0500)  SpO2: 98 % (11/02/18 0702) Vital Signs (24h Range):  Temp:  [96 °F (35.6 °C)-98.5 °F (36.9 °C)] 97.9 °F (36.6 °C)  Pulse:  [] 88  Resp:  [16-28] 20  SpO2:  [93 %-100 %] 98 %  BP: (125-174)/(65-93) 152/73     Weight: 77.9 kg (171 lb 11.8 oz)  Body mass index is 33.54 kg/m².    Intake/Output Summary (Last 24 hours) at 11/2/2018 1017  Last data filed at 11/2/2018 0700  Gross per 24 hour   Intake 2610 ml   Output 50 ml   Net 2560 ml      Physical Exam   Constitutional: She is oriented to person, place, and time. She appears well-developed and well-nourished. No distress.   HENT:   Head: Normocephalic and atraumatic.   Eyes: Conjunctivae are normal. No scleral icterus.   Neck: Normal range of motion.   Cardiovascular: Normal rate, regular rhythm, normal heart sounds and intact distal pulses.   Pulmonary/Chest: Effort normal and breath sounds normal. No respiratory distress. She has no wheezes.   Abdominal: Soft.  She exhibits no distension. There is no tenderness.   Musculoskeletal: She exhibits no edema or tenderness.   R leg in brace   Neurological: She is alert and oriented to person, place, and time.   Skin: Skin is warm and dry.   Psychiatric: She has a normal mood and affect. Her behavior is normal.   Nursing note and vitals reviewed.    Significant Labs:   A1C:   Recent Labs   Lab 09/26/18  0401 10/02/18  0522 10/04/18  0302   HGBA1C 6.4* 7.1* 7.1*     ABGs:   Recent Labs   Lab 10/31/18  2314   PH 7.438   PCO2 39.9   HCO3 27.0   POCSATURATED 97   BE 3     Blood Culture:   Recent Labs   Lab 10/31/18  2312 10/31/18  2336   LABBLOO No Growth to date  No Growth to date No Growth to date  No Growth to date     BMP:   Recent Labs   Lab 11/02/18  0647   *      K 3.9      CO2 26   BUN 7*   CREATININE 0.9   CALCIUM 8.6*   MG 2.3     CBC:   Recent Labs   Lab 10/31/18  2257 11/01/18  0432 11/02/18  0647   WBC 11.90 10.05 13.80*   HGB 8.7* 7.9* 8.2*   HCT 28.9* 26.3* 28.2*   * 353* 394*     CMP:   Recent Labs   Lab 10/31/18  2257 11/01/18  0432 11/01/18  2206 11/02/18  0647    138 139 141   K 3.4* 3.6 4.1 3.9    104 106 109   CO2 28 22* 23 26   * 349* 474* 303*   BUN 4* 5* 8 7*   CREATININE 1.0 1.0 1.1 0.9   CALCIUM 9.2 8.4* 8.5* 8.6*   PROT 5.8*  --   --   --    ALBUMIN 2.2*  --   --   --    BILITOT 0.8  --   --   --    ALKPHOS 103  --   --   --    AST 30  --   --   --    ALT 23  --   --   --    ANIONGAP 7* 12 10 6*   EGFRNONAA >60.0 >60.0 53.6* >60.0     Cardiac Markers:   Recent Labs   Lab 10/31/18  2257   BNP 31     Magnesium:   Recent Labs   Lab 10/31/18  2257 11/01/18  0432 11/02/18  0647   MG 1.0* 1.2* 2.3     POCT Glucose:   Recent Labs   Lab 11/02/18  0107 11/02/18  0545 11/02/18  0811   POCTGLUCOSE 331* 283* 264*     Troponin:   Recent Labs   Lab 10/31/18  2257   TROPONINI 0.024     Urine Culture: No results for input(s): LABURIN in the last 48 hours.  Urine Studies:    Recent Labs   Lab 11/01/18  0700   COLORU Yellow   APPEARANCEUA Cloudy*   PHUR 5.0   SPECGRAV 1.010   PROTEINUA 1+*   GLUCUA 2+*   KETONESU Negative   BILIRUBINUA Negative   OCCULTUA Negative   NITRITE Negative   LEUKOCYTESUR 3+*   RBCUA 5*   WBCUA 24*   BACTERIA Rare   SQUAMEPITHEL 8   HYALINECASTS 3*       Significant Imaging: I have reviewed all pertinent imaging results/findings within the past 24 hours.

## 2018-11-02 NOTE — HOSPITAL COURSE
Admitted to IM2. Found to have UTI, tx with Ceftriaxone. CT head negative. CXR, KUB negative for acute abnormality. Mental status improved with UTI tx and hydration. Started on insulin 20U qd, BG up in 400s, steroids that she got in ED likely contributing to hyperglycemia. Adjusting insulin regimen to 20U bid, 10U tidwm (home dose is 20u bid, 16u tidwm). 2 out of 2 cultures positive for GPCs. Repeated blood cxs and started on vancomycin. Urine cx grew multiple organisms, none in predominance. Continuing ceftriaxone. BG trending low; decreased insulin regimen to 10u bid basal insulin and 5u aspart tidwm. Breathing well on 1L NC. Blood cxs came back as staph epidermidis contaminant on 11/4. Discontinued abx. PT/OT recs short stay SNF, but pt's insurance not approving that at this time per patient. She is stable for discharge home with home health.

## 2018-11-02 NOTE — ASSESSMENT & PLAN NOTE
A1c 7.1 10/4/18. Per med list provided by patient, only taking detemir 20 u BID    - Started with detemir 20 u qHS with LDSSI and Accuchecks. BG up to 474; steroids given in ED likely contributing to hyperglycemia. Increased insulin regimen to 20u bid detemir and aspart 10u tidwm (home dose is 20u bid, 16u tidwm).   - Diabetic diet

## 2018-11-02 NOTE — PROGRESS NOTES
Consulted to see for sacral ulceration  Upon assessment , noted saturated aquacel dressing and bed pad. Pt has a pur wick device in place for urine containment but it had become dislodged. Feel that wound could be better managed with barrier creams due to frequent urinary incontinence. Applied Critic aid paste to superficial sacral ulcer. Spoke with Dr Ruvalcaba regarding wound care recommendations and he is in agreement.      11/02/18 1035       Pressure Injury 09/11/18 1515 medial Sacral spine Stage 2   Date First Assessed/Time First Assessed: 09/11/18 1515   Orientation: medial  Location: Sacral spine  Staging: Stage 2   Wound Image    Staging 2   Dressing Appearance Intact;Moist drainage  (urine saturated foam dressing )   Drainage Amount None   Drainage Characteristics/Odor No odor   Appearance Pink;Smooth   Tissue loss description Partial thickness   Red (%), Wound Tissue Color 100 %  (pink)   Periwound Area Intact   Wound Edges Open   Wound Length (cm) 1 cm   Wound Width (cm) 0.3 cm   Wound Depth (cm) 0.1 cm   Wound Volume (cm^3) 0.03 cm^3   Care Cleansed with:;Soap and water;Applied:;Skin Barrier  (Applied critic aid paste )     Jonathon Zambrano RN CWON  [id number]

## 2018-11-02 NOTE — NURSING
MD called in regards to inability of blood sugar to decrease since the am. Blood sugar presently 438 after giving 2 separate Novolog insulin of 5 units SQ X2.

## 2018-11-02 NOTE — PLAN OF CARE
Problem: Patient Care Overview  Goal: Plan of Care Review  Outcome: Ongoing (interventions implemented as appropriate)  Patient needs maximum assistance to return to bed. Rt leg broken per patient has a splint in place with foot drop noted , Good pulses , no circulation impairment . Bed in low position  ,bed locked , Side rails x2 , Call light within reach I have reviewed the following reach labs and blood sugar . Sacral intact no breakdown, skin barrier Patient informed to call as needed Awake alert , orientated x4

## 2018-11-03 LAB
ANION GAP SERPL CALC-SCNC: 5 MMOL/L
BASOPHILS # BLD AUTO: 0.01 K/UL
BASOPHILS NFR BLD: 0.1 %
BUN SERPL-MCNC: 8 MG/DL
CALCIUM SERPL-MCNC: 8.4 MG/DL
CHLORIDE SERPL-SCNC: 111 MMOL/L
CO2 SERPL-SCNC: 27 MMOL/L
CREAT SERPL-MCNC: 0.9 MG/DL
DIFFERENTIAL METHOD: ABNORMAL
EOSINOPHIL # BLD AUTO: 0 K/UL
EOSINOPHIL NFR BLD: 0 %
ERYTHROCYTE [DISTWIDTH] IN BLOOD BY AUTOMATED COUNT: 18 %
EST. GFR  (AFRICAN AMERICAN): >60 ML/MIN/1.73 M^2
EST. GFR  (NON AFRICAN AMERICAN): >60 ML/MIN/1.73 M^2
GLUCOSE SERPL-MCNC: 94 MG/DL
HCT VFR BLD AUTO: 26.1 %
HGB BLD-MCNC: 7.5 G/DL
IMM GRANULOCYTES # BLD AUTO: 0.09 K/UL
IMM GRANULOCYTES NFR BLD AUTO: 0.6 %
LYMPHOCYTES # BLD AUTO: 1.9 K/UL
LYMPHOCYTES NFR BLD: 13.8 %
MAGNESIUM SERPL-MCNC: 2.2 MG/DL
MCH RBC QN AUTO: 26.4 PG
MCHC RBC AUTO-ENTMCNC: 28.7 G/DL
MCV RBC AUTO: 92 FL
MONOCYTES # BLD AUTO: 0.8 K/UL
MONOCYTES NFR BLD: 5.4 %
NEUTROPHILS # BLD AUTO: 11.2 K/UL
NEUTROPHILS NFR BLD: 80.1 %
NRBC BLD-RTO: 0 /100 WBC
PLATELET # BLD AUTO: 393 K/UL
PMV BLD AUTO: 9.9 FL
POCT GLUCOSE: 110 MG/DL (ref 70–110)
POCT GLUCOSE: 127 MG/DL (ref 70–110)
POCT GLUCOSE: 144 MG/DL (ref 70–110)
POCT GLUCOSE: 68 MG/DL (ref 70–110)
POCT GLUCOSE: 73 MG/DL (ref 70–110)
POCT GLUCOSE: 79 MG/DL (ref 70–110)
POTASSIUM SERPL-SCNC: 4 MMOL/L
RBC # BLD AUTO: 2.84 M/UL
SODIUM SERPL-SCNC: 143 MMOL/L
WBC # BLD AUTO: 14 K/UL

## 2018-11-03 PROCEDURE — 25000003 PHARM REV CODE 250: Performed by: STUDENT IN AN ORGANIZED HEALTH CARE EDUCATION/TRAINING PROGRAM

## 2018-11-03 PROCEDURE — 99900035 HC TECH TIME PER 15 MIN (STAT)

## 2018-11-03 PROCEDURE — 25000003 PHARM REV CODE 250

## 2018-11-03 PROCEDURE — 63600175 PHARM REV CODE 636 W HCPCS: Performed by: INTERNAL MEDICINE

## 2018-11-03 PROCEDURE — 94761 N-INVAS EAR/PLS OXIMETRY MLT: CPT

## 2018-11-03 PROCEDURE — 83735 ASSAY OF MAGNESIUM: CPT

## 2018-11-03 PROCEDURE — 25000242 PHARM REV CODE 250 ALT 637 W/ HCPCS

## 2018-11-03 PROCEDURE — 85025 COMPLETE CBC W/AUTO DIFF WBC: CPT

## 2018-11-03 PROCEDURE — 63600175 PHARM REV CODE 636 W HCPCS: Performed by: STUDENT IN AN ORGANIZED HEALTH CARE EDUCATION/TRAINING PROGRAM

## 2018-11-03 PROCEDURE — 99232 SBSQ HOSP IP/OBS MODERATE 35: CPT | Mod: GC,,, | Performed by: INTERNAL MEDICINE

## 2018-11-03 PROCEDURE — 27000221 HC OXYGEN, UP TO 24 HOURS

## 2018-11-03 PROCEDURE — 80048 BASIC METABOLIC PNL TOTAL CA: CPT

## 2018-11-03 PROCEDURE — 63600175 PHARM REV CODE 636 W HCPCS

## 2018-11-03 PROCEDURE — A4216 STERILE WATER/SALINE, 10 ML: HCPCS

## 2018-11-03 PROCEDURE — 94640 AIRWAY INHALATION TREATMENT: CPT

## 2018-11-03 PROCEDURE — 11000001 HC ACUTE MED/SURG PRIVATE ROOM

## 2018-11-03 PROCEDURE — 36415 COLL VENOUS BLD VENIPUNCTURE: CPT

## 2018-11-03 RX ORDER — DILTIAZEM HYDROCHLORIDE 180 MG/1
180 CAPSULE, COATED, EXTENDED RELEASE ORAL DAILY
Status: ON HOLD | COMMUNITY
End: 2019-03-13 | Stop reason: HOSPADM

## 2018-11-03 RX ORDER — LANSOPRAZOLE 30 MG/1
30 CAPSULE, DELAYED RELEASE ORAL DAILY
COMMUNITY

## 2018-11-03 RX ORDER — GABAPENTIN 300 MG/1
300 CAPSULE ORAL DAILY
COMMUNITY

## 2018-11-03 RX ORDER — IBUPROFEN 200 MG
800 TABLET ORAL ONCE
Status: COMPLETED | OUTPATIENT
Start: 2018-11-03 | End: 2018-11-03

## 2018-11-03 RX ORDER — BACLOFEN 10 MG/1
10 TABLET ORAL 2 TIMES DAILY PRN
Status: ON HOLD | COMMUNITY
End: 2019-03-13 | Stop reason: HOSPADM

## 2018-11-03 RX ORDER — FLUTICASONE FUROATE AND VILANTEROL 100; 25 UG/1; UG/1
1 POWDER RESPIRATORY (INHALATION) DAILY
COMMUNITY

## 2018-11-03 RX ORDER — ACETAMINOPHEN 325 MG/1
650 TABLET ORAL EVERY 6 HOURS PRN
Status: DISCONTINUED | OUTPATIENT
Start: 2018-11-03 | End: 2018-11-04 | Stop reason: HOSPADM

## 2018-11-03 RX ORDER — INSULIN ASPART 100 [IU]/ML
5 INJECTION, SOLUTION INTRAVENOUS; SUBCUTANEOUS
Status: DISCONTINUED | OUTPATIENT
Start: 2018-11-03 | End: 2018-11-04

## 2018-11-03 RX ORDER — ACETAMINOPHEN 500 MG
1000 TABLET ORAL 2 TIMES DAILY PRN
COMMUNITY

## 2018-11-03 RX ORDER — DICLOFENAC SODIUM 10 MG/G
2 GEL TOPICAL DAILY PRN
COMMUNITY
End: 2019-08-20 | Stop reason: ALTCHOICE

## 2018-11-03 RX ORDER — ALBUTEROL SULFATE 0.83 MG/ML
2.5 SOLUTION RESPIRATORY (INHALATION) EVERY 6 HOURS
Status: ON HOLD | COMMUNITY
End: 2018-12-15 | Stop reason: HOSPADM

## 2018-11-03 RX ORDER — INSULIN LISPRO 100 [IU]/ML
16 INJECTION, SOLUTION INTRAVENOUS; SUBCUTANEOUS
Status: ON HOLD | COMMUNITY
End: 2018-12-15 | Stop reason: HOSPADM

## 2018-11-03 RX ORDER — ALBUTEROL SULFATE 90 UG/1
2 AEROSOL, METERED RESPIRATORY (INHALATION) EVERY 6 HOURS PRN
COMMUNITY

## 2018-11-03 RX ADMIN — INSULIN ASPART 5 UNITS: 100 INJECTION, SOLUTION INTRAVENOUS; SUBCUTANEOUS at 12:11

## 2018-11-03 RX ADMIN — LOSARTAN POTASSIUM 100 MG: 50 TABLET ORAL at 09:11

## 2018-11-03 RX ADMIN — CEFTRIAXONE SODIUM 1 G: 1 INJECTION, POWDER, FOR SOLUTION INTRAMUSCULAR; INTRAVENOUS at 08:11

## 2018-11-03 RX ADMIN — FLUTICASONE FUROATE AND VILANTEROL TRIFENATATE 1 PUFF: 100; 25 POWDER RESPIRATORY (INHALATION) at 09:11

## 2018-11-03 RX ADMIN — ASPIRIN 81 MG: 81 TABLET, COATED ORAL at 09:11

## 2018-11-03 RX ADMIN — DILTIAZEM HYDROCHLORIDE 180 MG: 180 CAPSULE, COATED, EXTENDED RELEASE ORAL at 09:11

## 2018-11-03 RX ADMIN — DULOXETINE 60 MG: 60 CAPSULE, DELAYED RELEASE ORAL at 09:11

## 2018-11-03 RX ADMIN — ATORVASTATIN CALCIUM 40 MG: 20 TABLET, FILM COATED ORAL at 09:11

## 2018-11-03 RX ADMIN — Medication 3 ML: at 08:11

## 2018-11-03 RX ADMIN — CLOPIDOGREL 75 MG: 75 TABLET, FILM COATED ORAL at 09:11

## 2018-11-03 RX ADMIN — IPRATROPIUM BROMIDE AND ALBUTEROL SULFATE 3 ML: .5; 3 SOLUTION RESPIRATORY (INHALATION) at 07:11

## 2018-11-03 RX ADMIN — Medication 16 G: at 06:11

## 2018-11-03 RX ADMIN — ENOXAPARIN SODIUM 40 MG: 100 INJECTION SUBCUTANEOUS at 05:11

## 2018-11-03 RX ADMIN — IBUPROFEN 800 MG: 200 TABLET, FILM COATED ORAL at 01:11

## 2018-11-03 RX ADMIN — IPRATROPIUM BROMIDE AND ALBUTEROL SULFATE 3 ML: .5; 3 SOLUTION RESPIRATORY (INHALATION) at 08:11

## 2018-11-03 RX ADMIN — INSULIN ASPART 5 UNITS: 100 INJECTION, SOLUTION INTRAVENOUS; SUBCUTANEOUS at 09:11

## 2018-11-03 RX ADMIN — ACETAMINOPHEN 650 MG: 325 TABLET, FILM COATED ORAL at 06:11

## 2018-11-03 RX ADMIN — VANCOMYCIN HYDROCHLORIDE 1500 MG: 10 INJECTION, POWDER, LYOPHILIZED, FOR SOLUTION INTRAVENOUS at 05:11

## 2018-11-03 RX ADMIN — PANTOPRAZOLE SODIUM 40 MG: 40 TABLET, DELAYED RELEASE ORAL at 09:11

## 2018-11-03 NOTE — NURSING
Informed Dr Castrejon informed of Blood sugar post meal 0911 127, pre lunch 79. Reviewed scheduled apart 5 u .

## 2018-11-03 NOTE — NURSING
Patient sob /wheezing , resp 24 , accessory breathing , Dr Castrejon called stat treatment order obtained. Notified resp  Will inform resp tech of need for treatment.

## 2018-11-03 NOTE — PROGRESS NOTES
"[facility] Hospital Medicine  Progress Note    Patient Name: Sheryl Martines  MRN: [id number]  Patient Class: IP- Inpatient   Admission Date: 10/31/2018  Length of Stay: 2 days  Attending Physician: Kendrick Celis MD  Primary Care Provider: [facility] Medicine Team: [facility] HOSP MED 2 Omid Castrejon MD    Subjective:     Principal Problem:Acute metabolic encephalopathy    HPI:  62 yo F with a PMH of asthma, HTN, HFpEF, CAD, DM2 on insulin c/p neuropathy, and history of CVA who presented to the ED after daughter called EMS due to altered mental status. The patient's daughter stated the patient began "not making sense" and "talking garbled" since the day prior to presentation. On my interview, patient's speech was still somewhat non-sensical, but was able to answer simple questions. Per patient she has had no appetite and no PO intake for 4-5 days. Of note patient was recently hospitalized following emergent appendectomy that was converted to open surgery and was complicated by surgical site infection. Surgical site grew MRSA, Entercoccus and E. coli. Treated with Vancomycin and Ertapenem. This was complicated by C diff infection, for which patient completed course of PO Vanc. Denies diarrhea, f/c/n/v at presentation. Also has close tib/fib fracture which occurred after recent discharge, currently in splint, being treated conservatively.    Hospital Course:  Admitted to IM2. Found to have UTI, tx with Ceftriaxone. CT head negative. CXR, KUB negative for acute abnormality. Mental status improved with UTI tx and hydration. Started on insulin 20U qd, BG up in 400s, steroids that she got in ED likely contributing to hyperglycemia. Adjusting insulin regimen to 20U bid, 10U tidwm (home dose is 20u bid, 16u tidwm). 2 out of 2 cultures positive for GPCs. Repeated blood cxs and started on vancomycin. Urine cx grew multiple organisms, none in predominance. Continuing ceftriaxone. BG " controlled on current insulin regimen.     Interval History: C/o headache, given tylenol. Otherwise denies fever, chills, N/V/D, SOB. No signs of altered mentation.     Review of Systems   Constitutional: Positive for fatigue. Negative for chills and fever.   HENT: Negative for sore throat.    Eyes: Negative for visual disturbance.   Respiratory: Negative for cough and shortness of breath.    Cardiovascular: Negative for chest pain.   Gastrointestinal: Negative for abdominal pain, diarrhea, nausea and vomiting.   Endocrine: Negative for polyuria.   Genitourinary: Negative for dysuria.   Musculoskeletal: Negative for back pain.   Skin: Negative for rash and wound.   Neurological: Negative for headaches.   Psychiatric/Behavioral: Negative for agitation, behavioral problems and confusion.     Objective:     Vital Signs (Most Recent):  Temp: 98.3 °F (36.8 °C) (11/03/18 0500)  Pulse: 80 (11/03/18 0813)  Resp: (!) 30 (11/03/18 0813)  BP: (!) 174/89 (11/03/18 0605)  SpO2: 97 % (11/03/18 0813) Vital Signs (24h Range):  Temp:  [97.6 °F (36.4 °C)-98.5 °F (36.9 °C)] 98.3 °F (36.8 °C)  Pulse:  [70-88] 80  Resp:  [17-30] 30  SpO2:  [97 %-100 %] 97 %  BP: (116-174)/(59-89) 174/89     Weight: 76.7 kg (169 lb 1.5 oz)  Body mass index is 33.02 kg/m².    Intake/Output Summary (Last 24 hours) at 11/3/2018 0819  Last data filed at 11/3/2018 0600  Gross per 24 hour   Intake 120 ml   Output 535 ml   Net -415 ml      Physical Exam   Constitutional: She is oriented to person, place, and time. She appears well-developed and well-nourished. No distress.   HENT:   Head: Normocephalic and atraumatic.   Eyes: Conjunctivae are normal. No scleral icterus.   Neck: Normal range of motion.   Cardiovascular: Normal rate, regular rhythm, normal heart sounds and intact distal pulses.   Pulmonary/Chest: Effort normal and breath sounds normal. No respiratory distress. She has no wheezes.   Abdominal: Soft. She exhibits no distension. There is no  tenderness.   Musculoskeletal: She exhibits no edema or tenderness.   R leg in brace   Neurological: She is alert and oriented to person, place, and time.   Skin: Skin is warm and dry.   Psychiatric: She has a normal mood and affect. Her behavior is normal.   Nursing note and vitals reviewed.    Significant Labs:   A1C:   Recent Labs   Lab 09/26/18  0401 10/02/18  0522 10/04/18  0302   HGBA1C 6.4* 7.1* 7.1*     ABGs:   No results for input(s): PH, PCO2, HCO3, POCSATURATED, BE, TOTALHB, COHB, METHB, O2HB, POCFIO2 in the last 48 hours.  Blood Culture:   Recent Labs   Lab 11/02/18  1736   LABBLOO No Growth to date  No Growth to date     BMP:   Recent Labs   Lab 11/03/18  0529   GLU 94      K 4.0   *   CO2 27   BUN 8   CREATININE 0.9   CALCIUM 8.4*   MG 2.2     CBC:   Recent Labs   Lab 11/02/18  0647 11/03/18  0529   WBC 13.80* 14.00*   HGB 8.2* 7.5*   HCT 28.2* 26.1*   * 393*     CMP:   Recent Labs   Lab 11/01/18  2206 11/02/18  0647 11/03/18  0529    141 143   K 4.1 3.9 4.0    109 111*   CO2 23 26 27   * 303* 94   BUN 8 7* 8   CREATININE 1.1 0.9 0.9   CALCIUM 8.5* 8.6* 8.4*   ANIONGAP 10 6* 5*   EGFRNONAA 53.6* >60.0 >60.0     Cardiac Markers:   No results for input(s): CKMB, MYOGLOBIN, BNP, TROPISTAT in the last 48 hours.  Magnesium:   Recent Labs   Lab 11/02/18  0647 11/03/18  0529   MG 2.3 2.2     POCT Glucose:   Recent Labs   Lab 11/02/18  1732 11/03/18  0054 11/03/18  0635   POCTGLUCOSE 136* 110 68*       Significant Imaging: I have reviewed all pertinent imaging results/findings within the past 24 hours.    Assessment/Plan:      * Acute metabolic encephalopathy    Initial concern for asthma/COPD exacerbation. Per patient's daughter, patient has a history of CO2 narcosis;however, VBG unremarkable. CXR without consolidation. No leukocytosis or lactic acidosis to raise suspicion of reoccurrence of intra abdominal infection. Glucose only mildly elevated. No uremia.     -  Initially treated asthma/COPD exacerbation given presence of wheezing on exam with azithromycin and prednisone. This was discontinued due to resolution of wheezing, pt breathing and satting well on NC.   - Initial tachycardia improved with fluids  - Nieves placed in the ED, no UOP after 2 L IVF (though no NIKHIL on labs)- discontinued nieves.   - Zyprexa ODT 10 BID PRN for agitation  - CT head negative. CXR, KUB negative for acute abnormality.   - UA showing UTI- treating with Ceftriaxone 1g IV q24  - Mental status improved with UTI tx and hydration.   - Blood cx growing GPCs. Started Vancomycin 1500mg qd.  - F/u repeat blood cxs  - Urine cx growing multiple organisms, none in predominance.      Insulin dependent diabetes mellitus    A1c 7.1 10/4/18. Per med list provided by patient, only taking detemir 20 u BID    - Started with detemir 20 u qHS with LDSSI and Accuchecks. BG up to 474; steroids given in ED likely contributing to hyperglycemia. Increased insulin regimen to 20u bid detemir and aspart 10u tidwm (home dose is 20u bid, 16u tidwm). BG controlled on this regimen thus far.   - Diabetic diet     Discharge planning issues    - During last admission, patient was significant placement issue   - Has [place] Medicaid which would only approve SNF in [place]; however, patient wished to be near daughter  - PT/OT eval and treat     Dehydration    - Per principle problem       Hypomagnesemia    Magnesium 1.0 in ED. s/p 2 g mag sulfate.  - Gave additional 2g mag sulfate IV q2h x 3 doses     History of CVA (cerebrovascular accident)    - Continue ASA, plavix and statin       Closed fracture of right tibia and fibula    - splint in place to RLE     Gastroesophageal reflux disease without esophagitis    - Pantoprazole qD       (HFpEF) heart failure with preserved ejection fraction    - Continue losartan  - Strict I&O and daily weights  - Cardiac diet       CAD (coronary artery disease)    - Continue ASA and statin         VTE  Risk Mitigation (From admission, onward)        Ordered     enoxaparin injection 40 mg  Daily      11/01/18 0333     Place sequential compression device  Until discontinued      11/01/18 0333     IP VTE HIGH RISK PATIENT  Once      11/01/18 0333        Omid Castrejon MD  Department of Hospital Medicine   [facility]

## 2018-11-03 NOTE — PLAN OF CARE
Problem: Patient Care Overview  Goal: Plan of Care Review  Outcome: Ongoing (interventions implemented as appropriate)  Ms. Saucedo slept for the majority of the night. She did not wear her Bi-Pap last night. She was on 2l oxygen per nasal cannula and maintained her Spo2 wnl. This am she c/o HA and an order was received for Tylenol which was given. Her accucheck were 68 this am and 4 glucose tabs were given as ordered. Will continue to monitor.

## 2018-11-03 NOTE — ASSESSMENT & PLAN NOTE
Initial concern for asthma/COPD exacerbation. Per patient's daughter, patient has a history of CO2 narcosis;however, VBG unremarkable. CXR without consolidation. No leukocytosis or lactic acidosis to raise suspicion of reoccurrence of intra abdominal infection. Glucose only mildly elevated. No uremia.     - Initially treated asthma/COPD exacerbation given presence of wheezing on exam with azithromycin and prednisone. This was discontinued due to resolution of wheezing, pt breathing and satting well on NC.   - Initial tachycardia improved with fluids  - Nieves placed in the ED, no UOP after 2 L IVF (though no NIKHIL on labs)- discontinued nieves.   - Zyprexa ODT 10 BID PRN for agitation  - CT head negative. CXR, KUB negative for acute abnormality.   - UA showing UTI- treating with Ceftriaxone 1g IV q24  - Mental status improved with UTI tx and hydration.   - Blood cx growing GPCs. Started Vancomycin 1500mg qd.  - F/u repeat blood cxs  - Urine cx growing multiple organisms, none in predominance.

## 2018-11-03 NOTE — NURSING
Patient upset stated she ask resp if she will return. I spoke with therapist and she told me she tried x2 to administer  Treatment and patient was not available .Ask if she will  Return she replied no , she cannot wait  Around for to be avaiable.OC informed

## 2018-11-03 NOTE — NURSING
Resp at bedside , humdifier  Placed. resp less 20 m non accessory muscle symmetrical unlabored and regular

## 2018-11-03 NOTE — ASSESSMENT & PLAN NOTE
A1c 7.1 10/4/18. Per med list provided by patient, only taking detemir 20 u BID    - Started with detemir 20 u qHS with LDSSI and Accuchecks. BG up to 474; steroids given in ED likely contributing to hyperglycemia. Increased insulin regimen to 20u bid detemir and aspart 10u tidwm (home dose is 20u bid, 16u tidwm). BG controlled on this regimen thus far.   - Diabetic diet

## 2018-11-03 NOTE — SUBJECTIVE & OBJECTIVE
Interval History: C/o headache, given tylenol. Otherwise denies fever, chills, N/V/D, SOB. No signs of altered mentation.     Review of Systems   Constitutional: Positive for fatigue. Negative for chills and fever.   HENT: Negative for sore throat.    Eyes: Negative for visual disturbance.   Respiratory: Negative for cough and shortness of breath.    Cardiovascular: Negative for chest pain.   Gastrointestinal: Negative for abdominal pain, diarrhea, nausea and vomiting.   Endocrine: Negative for polyuria.   Genitourinary: Negative for dysuria.   Musculoskeletal: Negative for back pain.   Skin: Negative for rash and wound.   Neurological: Negative for headaches.   Psychiatric/Behavioral: Negative for agitation, behavioral problems and confusion.     Objective:     Vital Signs (Most Recent):  Temp: 98.3 °F (36.8 °C) (11/03/18 0500)  Pulse: 80 (11/03/18 0813)  Resp: (!) 30 (11/03/18 0813)  BP: (!) 174/89 (11/03/18 0605)  SpO2: 97 % (11/03/18 0813) Vital Signs (24h Range):  Temp:  [97.6 °F (36.4 °C)-98.5 °F (36.9 °C)] 98.3 °F (36.8 °C)  Pulse:  [70-88] 80  Resp:  [17-30] 30  SpO2:  [97 %-100 %] 97 %  BP: (116-174)/(59-89) 174/89     Weight: 76.7 kg (169 lb 1.5 oz)  Body mass index is 33.02 kg/m².    Intake/Output Summary (Last 24 hours) at 11/3/2018 0819  Last data filed at 11/3/2018 0600  Gross per 24 hour   Intake 120 ml   Output 535 ml   Net -415 ml      Physical Exam   Constitutional: She is oriented to person, place, and time. She appears well-developed and well-nourished. No distress.   HENT:   Head: Normocephalic and atraumatic.   Eyes: Conjunctivae are normal. No scleral icterus.   Neck: Normal range of motion.   Cardiovascular: Normal rate, regular rhythm, normal heart sounds and intact distal pulses.   Pulmonary/Chest: Effort normal and breath sounds normal. No respiratory distress. She has no wheezes.   Abdominal: Soft. She exhibits no distension. There is no tenderness.   Musculoskeletal: She exhibits no  edema or tenderness.   R leg in brace   Neurological: She is alert and oriented to person, place, and time.   Skin: Skin is warm and dry.   Psychiatric: She has a normal mood and affect. Her behavior is normal.   Nursing note and vitals reviewed.    Significant Labs:   A1C:   Recent Labs   Lab 09/26/18  0401 10/02/18  0522 10/04/18  0302   HGBA1C 6.4* 7.1* 7.1*     ABGs:   No results for input(s): PH, PCO2, HCO3, POCSATURATED, BE, TOTALHB, COHB, METHB, O2HB, POCFIO2 in the last 48 hours.  Blood Culture:   Recent Labs   Lab 11/02/18  1736   LABBLOO No Growth to date  No Growth to date     BMP:   Recent Labs   Lab 11/03/18  0529   GLU 94      K 4.0   *   CO2 27   BUN 8   CREATININE 0.9   CALCIUM 8.4*   MG 2.2     CBC:   Recent Labs   Lab 11/02/18  0647 11/03/18  0529   WBC 13.80* 14.00*   HGB 8.2* 7.5*   HCT 28.2* 26.1*   * 393*     CMP:   Recent Labs   Lab 11/01/18  2206 11/02/18  0647 11/03/18  0529    141 143   K 4.1 3.9 4.0    109 111*   CO2 23 26 27   * 303* 94   BUN 8 7* 8   CREATININE 1.1 0.9 0.9   CALCIUM 8.5* 8.6* 8.4*   ANIONGAP 10 6* 5*   EGFRNONAA 53.6* >60.0 >60.0     Cardiac Markers:   No results for input(s): CKMB, MYOGLOBIN, BNP, TROPISTAT in the last 48 hours.  Magnesium:   Recent Labs   Lab 11/02/18  0647 11/03/18  0529   MG 2.3 2.2     POCT Glucose:   Recent Labs   Lab 11/02/18  1732 11/03/18  0054 11/03/18  0635   POCTGLUCOSE 136* 110 68*       Significant Imaging: I have reviewed all pertinent imaging results/findings within the past 24 hours.

## 2018-11-04 VITALS
HEART RATE: 91 BPM | RESPIRATION RATE: 18 BRPM | BODY MASS INDEX: 32.85 KG/M2 | OXYGEN SATURATION: 97 % | WEIGHT: 167.31 LBS | SYSTOLIC BLOOD PRESSURE: 157 MMHG | HEIGHT: 60 IN | TEMPERATURE: 98 F | DIASTOLIC BLOOD PRESSURE: 76 MMHG

## 2018-11-04 LAB
ANION GAP SERPL CALC-SCNC: 6 MMOL/L
BASOPHILS # BLD AUTO: 0.03 K/UL
BASOPHILS NFR BLD: 0.3 %
BUN SERPL-MCNC: 6 MG/DL
CALCIUM SERPL-MCNC: 8.6 MG/DL
CHLORIDE SERPL-SCNC: 109 MMOL/L
CO2 SERPL-SCNC: 29 MMOL/L
CREAT SERPL-MCNC: 0.8 MG/DL
DIFFERENTIAL METHOD: ABNORMAL
EOSINOPHIL # BLD AUTO: 0.1 K/UL
EOSINOPHIL NFR BLD: 1.3 %
ERYTHROCYTE [DISTWIDTH] IN BLOOD BY AUTOMATED COUNT: 18.3 %
EST. GFR  (AFRICAN AMERICAN): >60 ML/MIN/1.73 M^2
EST. GFR  (NON AFRICAN AMERICAN): >60 ML/MIN/1.73 M^2
GLUCOSE SERPL-MCNC: 70 MG/DL
HCT VFR BLD AUTO: 27.8 %
HGB BLD-MCNC: 7.9 G/DL
IMM GRANULOCYTES # BLD AUTO: 0.08 K/UL
IMM GRANULOCYTES NFR BLD AUTO: 0.9 %
LYMPHOCYTES # BLD AUTO: 3.1 K/UL
LYMPHOCYTES NFR BLD: 34.1 %
MAGNESIUM SERPL-MCNC: 1.5 MG/DL
MCH RBC QN AUTO: 26.2 PG
MCHC RBC AUTO-ENTMCNC: 28.4 G/DL
MCV RBC AUTO: 92 FL
MONOCYTES # BLD AUTO: 0.6 K/UL
MONOCYTES NFR BLD: 6.5 %
NEUTROPHILS # BLD AUTO: 5.1 K/UL
NEUTROPHILS NFR BLD: 56.9 %
NRBC BLD-RTO: 0 /100 WBC
PLATELET # BLD AUTO: 410 K/UL
PMV BLD AUTO: 9.9 FL
POCT GLUCOSE: 115 MG/DL (ref 70–110)
POCT GLUCOSE: 60 MG/DL (ref 70–110)
POCT GLUCOSE: 75 MG/DL (ref 70–110)
POCT GLUCOSE: 92 MG/DL (ref 70–110)
POTASSIUM SERPL-SCNC: 3.3 MMOL/L
RBC # BLD AUTO: 3.02 M/UL
SODIUM SERPL-SCNC: 144 MMOL/L
WBC # BLD AUTO: 8.95 K/UL

## 2018-11-04 PROCEDURE — 25000242 PHARM REV CODE 250 ALT 637 W/ HCPCS

## 2018-11-04 PROCEDURE — 36415 COLL VENOUS BLD VENIPUNCTURE: CPT

## 2018-11-04 PROCEDURE — 99239 HOSP IP/OBS DSCHRG MGMT >30: CPT | Mod: GC,,, | Performed by: INTERNAL MEDICINE

## 2018-11-04 PROCEDURE — 25000003 PHARM REV CODE 250

## 2018-11-04 PROCEDURE — 94761 N-INVAS EAR/PLS OXIMETRY MLT: CPT

## 2018-11-04 PROCEDURE — 80048 BASIC METABOLIC PNL TOTAL CA: CPT

## 2018-11-04 PROCEDURE — 85025 COMPLETE CBC W/AUTO DIFF WBC: CPT

## 2018-11-04 PROCEDURE — 63600175 PHARM REV CODE 636 W HCPCS: Performed by: STUDENT IN AN ORGANIZED HEALTH CARE EDUCATION/TRAINING PROGRAM

## 2018-11-04 PROCEDURE — 25000003 PHARM REV CODE 250: Performed by: STUDENT IN AN ORGANIZED HEALTH CARE EDUCATION/TRAINING PROGRAM

## 2018-11-04 PROCEDURE — 94640 AIRWAY INHALATION TREATMENT: CPT

## 2018-11-04 PROCEDURE — 83735 ASSAY OF MAGNESIUM: CPT

## 2018-11-04 PROCEDURE — 27000221 HC OXYGEN, UP TO 24 HOURS

## 2018-11-04 RX ORDER — LANOLIN ALCOHOL/MO/W.PET/CERES
400 CREAM (GRAM) TOPICAL ONCE
Status: COMPLETED | OUTPATIENT
Start: 2018-11-04 | End: 2018-11-04

## 2018-11-04 RX ORDER — HEPARIN 100 UNIT/ML
300 SYRINGE INTRAVENOUS ONCE
Status: COMPLETED | OUTPATIENT
Start: 2018-11-04 | End: 2018-11-04

## 2018-11-04 RX ORDER — POTASSIUM CHLORIDE 20 MEQ/1
40 TABLET, EXTENDED RELEASE ORAL ONCE
Status: COMPLETED | OUTPATIENT
Start: 2018-11-04 | End: 2018-11-04

## 2018-11-04 RX ADMIN — MAGNESIUM OXIDE TAB 400 MG (241.3 MG ELEMENTAL MG) 400 MG: 400 (241.3 MG) TAB at 08:11

## 2018-11-04 RX ADMIN — DILTIAZEM HYDROCHLORIDE 180 MG: 180 CAPSULE, COATED, EXTENDED RELEASE ORAL at 08:11

## 2018-11-04 RX ADMIN — PANTOPRAZOLE SODIUM 40 MG: 40 TABLET, DELAYED RELEASE ORAL at 08:11

## 2018-11-04 RX ADMIN — ACETAMINOPHEN 650 MG: 325 TABLET, FILM COATED ORAL at 05:11

## 2018-11-04 RX ADMIN — CLOPIDOGREL 75 MG: 75 TABLET, FILM COATED ORAL at 08:11

## 2018-11-04 RX ADMIN — IPRATROPIUM BROMIDE AND ALBUTEROL SULFATE 3 ML: .5; 3 SOLUTION RESPIRATORY (INHALATION) at 01:11

## 2018-11-04 RX ADMIN — DULOXETINE 60 MG: 60 CAPSULE, DELAYED RELEASE ORAL at 08:11

## 2018-11-04 RX ADMIN — LOSARTAN POTASSIUM 100 MG: 50 TABLET ORAL at 08:11

## 2018-11-04 RX ADMIN — IPRATROPIUM BROMIDE AND ALBUTEROL SULFATE 3 ML: .5; 3 SOLUTION RESPIRATORY (INHALATION) at 12:11

## 2018-11-04 RX ADMIN — IPRATROPIUM BROMIDE AND ALBUTEROL SULFATE 3 ML: .5; 3 SOLUTION RESPIRATORY (INHALATION) at 09:11

## 2018-11-04 RX ADMIN — FLUTICASONE FUROATE AND VILANTEROL TRIFENATATE 1 PUFF: 100; 25 POWDER RESPIRATORY (INHALATION) at 08:11

## 2018-11-04 RX ADMIN — ASPIRIN 81 MG: 81 TABLET, COATED ORAL at 08:11

## 2018-11-04 RX ADMIN — POTASSIUM CHLORIDE 40 MEQ: 1500 TABLET, EXTENDED RELEASE ORAL at 08:11

## 2018-11-04 RX ADMIN — HEPARIN 300 UNITS: 100 SYRINGE at 03:11

## 2018-11-04 RX ADMIN — ATORVASTATIN CALCIUM 40 MG: 20 TABLET, FILM COATED ORAL at 08:11

## 2018-11-04 NOTE — ASSESSMENT & PLAN NOTE
- During last admission, patient was significant placement issue   - Has [place] Medicaid which would only approve SNF in [place]; however, patient wished to be near daughter  - PT/OT eval and treat- recommend short stay SNF, but pt states her insurance does not approve SNF. She will have home health.

## 2018-11-04 NOTE — NURSING
Blood sugar checked 60 wants orange juice . Tablets are too big. Awaiting tray for supper Patient  is asymptomatic.Will wait to de accessed deandre catheter .witing repeat to notify staff resident

## 2018-11-04 NOTE — PLAN OF CARE
Pt being discharged home, and is to resume [facility], CM did speak with on call nurse.  Pt's daughter is providing transportation home.  Pt ready to discharge when ride arrives.

## 2018-11-04 NOTE — PLAN OF CARE
[facility]    HOME HEALTH ORDERS  FACE TO FACE ENCOUNTER    Patient Name: Sheryl Martines  YOB: 1955    PCP: Primary Doctor No   PCP Address: None  PCP Phone Number: None  PCP Fax: None    Encounter Date: 11/04/2018    Admit to Home Health    Diagnoses:  Active Hospital Problems    Diagnosis  POA    *Acute metabolic encephalopathy [G93.41]  Yes     Priority: 1 - High    Insulin dependent diabetes mellitus [E11.9, Z79.4]  Not Applicable     Priority: 2     Sacral decubitus ulcer, stage II [L89.152]  Yes    Hypomagnesemia [E83.42]  Yes    Dehydration [E86.0]  Yes    Discharge planning issues [Z02.9]  Not Applicable    History of CVA (cerebrovascular accident) [Z86.73]  Not Applicable    Closed fracture of right tibia and fibula [S82.201A, S82.401A]  Yes    Gastroesophageal reflux disease without esophagitis [K21.9]  Yes    (HFpEF) heart failure with preserved ejection fraction [I50.30]  Yes    CAD (coronary artery disease) [I25.10]  Yes      Resolved Hospital Problems   No resolved problems to display.       Future Appointments   Date Time Provider Department Center   11/5/2018 11:00 AM Sarah Lujan PA-C [facility]           I have seen and examined this patient face to face today. My clinical findings that support the need for the home health skilled services and home bound status are the following:  Weakness/numbness causing balance and gait disturbance due to COPD Exacerbation making it taxing to leave home.  Medical restrictions requiring assistance of another human to leave home due to  Dyspnea on exertion (SOB), Unstable ambulation, Decreased range of motions in extremities and Home oxygen requirement.    Allergies:  Review of patient's allergies indicates:   Allergen Reactions    Ace inhibitors Swelling    Hydralazine analogues     Tetracyclines Swelling    Travatan (with benzalkonium) [travoprost (benzalkonium)]        Diet: diabetic diet: 2000  calorie    Activities: activity as tolerated    Nursing:   SN to complete comprehensive assessment including routine vital signs. Instruct on disease process and s/s of complications to report to MD. Review/verify medication list sent home with the patient at time of discharge  and instruct patient/caregiver as needed. Frequency may be adjusted depending on start of care date.    Notify MD if SBP > 160 or < 90; DBP > 90 or < 50; HR > 120 or < 50; Temp > 101    CONSULTS:    Physical Therapy to evaluate and treat. Evaluate for home safety and equipment needs; Establish/upgrade home exercise program. Perform / instruct on therapeutic exercises, gait training, transfer training, and Range of Motion.  Occupational Therapy to evaluate and treat. Evaluate home environment for safety and equipment needs. Perform/Instruct on transfers, ADL training, ROM, and therapeutic exercises.   to evaluate for community resources/long-range planning.  Aide to provide assistance with personal care, ADLs, and vital signs.    MISCELLANEOUS CARE:  COPD: Teach patient MDI/HFA usage and guidelines  Please ensure that patient has a pulse oximeter and is educated on his normal oxygen saturations: 88-92%  Please ensure patient has a functioning nebulizer and provide education on its usage.    WOUND CARE ORDERS  n/a      Medications: Review discharge medications with patient and family and provide education.      Current Discharge Medication List      CONTINUE these medications which have NOT CHANGED    Details   acetaminophen (TYLENOL) 500 MG tablet Take 1,000 mg by mouth 2 (two) times daily as needed for Pain.      albuterol (PROVENTIL) 2.5 mg /3 mL (0.083 %) nebulizer solution Take 2.5 mg by nebulization every 6 (six) hours. Rescue      albuterol (PROVENTIL/VENTOLIN HFA) 90 mcg/actuation inhaler Inhale 2 puffs into the lungs every 6 (six) hours as needed for Wheezing or Shortness of Breath. Rescue      albuterol-ipratropium  (DUO-NEB) 2.5 mg-0.5 mg/3 mL nebulizer solution Take 3 mLs by nebulization every 4 (four) hours as needed for Wheezing or Shortness of Breath. Rescue       aspirin (ECOTRIN) 81 MG EC tablet Take 1 tablet (81 mg total) by mouth once daily.  Qty: 30 tablet, Refills: 11      baclofen (LIORESAL) 10 MG tablet Take 10 mg by mouth 2 (two) times daily as needed (MUSCLE SPASMS).      clopidogrel (PLAVIX) 75 mg tablet Take 75 mg by mouth once daily.      diclofenac sodium (VOLTAREN) 1 % Gel Apply 2 g topically daily as needed (PAIN).      diltiaZEM (CARDIZEM CD) 180 MG 24 hr capsule Take 180 mg by mouth once daily.      DULoxetine (CYMBALTA) 60 MG capsule Take 60 mg by mouth once daily.      fluticasone-vilanterol (BREO ELLIPTA) 100-25 mcg/dose diskus inhaler Inhale 1 puff into the lungs once daily. Controller      furosemide (LASIX) 40 MG tablet Take 40 mg by mouth once daily.       gabapentin (NEURONTIN) 300 MG capsule Take 300 mg by mouth once daily.      insulin detemir U-100 (LEVEMIR FLEXTOUCH) 100 unit/mL (3 mL) SubQ InPn pen Inject 28 Units into the skin once daily.  Qty: 5 Box, Refills: 10      insulin lispro (HUMALOG) 100 unit/mL injection Inject 16 Units into the skin 3 (three) times daily before meals. +SS      lansoprazole (PREVACID) 30 MG capsule Take 30 mg by mouth once daily.      losartan (COZAAR) 100 MG tablet Take 100 mg by mouth once daily.       ondansetron (ZOFRAN) 4 MG tablet Take 1 tablet (4 mg total) by mouth every 6 (six) hours as needed.  Qty: 30 tablet, Refills: 1      predniSONE (DELTASONE) 10 MG tablet Take 10 mg by mouth once daily.       pyridoxine, vitamin B6, (VITAMIN B-6) 50 MG Tab Take 1 tablet (50 mg total) by mouth once daily.  Refills: 0      senna-docusate 8.6-50 mg (SENNA WITH DOCUSATE SODIUM) 8.6-50 mg per tablet Take 1 tablet by mouth once daily.       simvastatin (ZOCOR) 40 MG tablet Take 0.5 tablets (20 mg total) by mouth every evening.  Qty: 30 tablet, Refills: 3      theophylline  (THEODUR) 300 mg 24 hr capsule Take 300 mg by mouth once daily.         STOP taking these medications       pantoprazole (PROTONIX) 40 MG tablet Comments:   Reason for Stopping:               I certify that this patient is confined to her home and needs intermittent skilled nursing care, physical therapy and occupational therapy.

## 2018-11-04 NOTE — NURSING
Informed Dr Castrejon of Blood sugar 60. A. Patient requested OJ , refused tablets repeat of blood sugar 92,  Patient  is asymptomatic.Awaiting daughter's arrival for discharge.

## 2018-11-04 NOTE — DISCHARGE SUMMARY
"[facility] Hospital Medicine  Discharge Summary      Patient Name: Sheryl Martines  MRN: [id number]  Admission Date: 10/31/2018  Hospital Length of Stay: 3 days  Discharge Date and Time:  11/04/2018 5:57 PM  Attending Physician: Kendrick Celis MD   Discharging Provider: Omid Castrejon MD  Primary Care Provider: Primary Doctor [facility] Medicine Team: [facility] 2 Omid Castrejon MD    HPI:   64 yo F with a PMH of asthma, HTN, HFpEF, CAD, DM2 on insulin c/p neuropathy, and history of CVA who presented to the ED after daughter called EMS due to altered mental status. The patient's daughter stated the patient began "not making sense" and "talking garbled" since the day prior to presentation. On my interview, patient's speech was still somewhat non-sensical, but was able to answer simple questions. Per patient she has had no appetite and no PO intake for 4-5 days. Of note patient was recently hospitalized following emergent appendectomy that was converted to open surgery and was complicated by surgical site infection. Surgical site grew MRSA, Entercoccus and E. coli. Treated with Vancomycin and Ertapenem. This was complicated by C diff infection, for which patient completed course of PO Vanc. Denies diarrhea, f/c/n/v at presentation. Also has close tib/fib fracture which occurred after recent discharge, currently in splint, being treated conservatively.    * No surgery found *      Hospital Course:   Admitted to IM2. Found to have UTI, tx with Ceftriaxone. CT head negative. CXR, KUB negative for acute abnormality. Mental status improved with UTI tx and hydration. Started on insulin 20U qd, BG up in 400s, steroids that she got in ED likely contributing to hyperglycemia. Adjusting insulin regimen to 20U bid, 10U tidwm (home dose is 20u bid, 16u tidwm). 2 out of 2 cultures positive for GPCs. Repeated blood cxs and started on vancomycin. Urine cx grew multiple organisms, none in " predominance. Continuing ceftriaxone. BG trending low; decreased insulin regimen to 10u bid basal insulin and 5u aspart tidwm. Breathing well on 1L NC. Blood cxs came back as staph epidermidis contaminant on 11/4. Discontinued abx. PT/OT recs short stay SNF, but pt's insurance not approving that at this time per patient. She is stable for discharge home with home health.      Vitals:    11/04/18 0856 11/04/18 0935 11/04/18 1006 11/04/18 1258   BP:       Pulse: 78 73  91   Resp: 20 20  18   Temp:   98.4 °F (36.9 °C)    TempSrc:   Oral    SpO2:  99%  97%   Weight:       Height:         Physical Exam   Constitutional: She is oriented to person, place, and time. She appears well-developed and well-nourished. No distress.   HENT:   Head: Normocephalic and atraumatic.   Eyes: Conjunctivae are normal. No scleral icterus.   Neck: Normal range of motion.   Cardiovascular: Normal rate, regular rhythm, normal heart sounds and intact distal pulses.   Pulmonary/Chest: Effort normal and breath sounds normal. No respiratory distress. She has no wheezes.   Abdominal: Soft. She exhibits no distension. There is no tenderness.   Musculoskeletal: She exhibits no edema or tenderness.   R leg in brace   Neurological: She is alert and oriented to person, place, and time.   Skin: Skin is warm and dry.   Psychiatric: She has a normal mood and affect. Her behavior is normal.   Nursing note and vitals reviewed.      * Acute metabolic encephalopathy    Initial concern for asthma/COPD exacerbation. Per patient's daughter, patient has a history of CO2 narcosis;however, VBG unremarkable. CXR without consolidation. No leukocytosis or lactic acidosis to raise suspicion of reoccurrence of intra abdominal infection. Glucose only mildly elevated. No uremia.     - Initially treated asthma/COPD exacerbation given presence of wheezing on exam with azithromycin and prednisone. This was discontinued due to resolution of wheezing, pt breathing and satting  well on NC.   - Initial tachycardia improved with fluids  - Nieves placed in the ED, no UOP after 2 L IVF (though no NIKHIL on labs)- discontinued nieves.   - Zyprexa ODT 10 BID PRN for agitation  - CT head negative. CXR, KUB negative for acute abnormality.   - UA showing UTI- treating with Ceftriaxone 1g IV q24 x3 days.  - Mental status improved with UTI tx and hydration.   - Blood cx growing GPCs. Started Vancomycin 1500mg qd. Identified as Staph epi contaminant. discontinued vanc.   - F/u repeat blood cxs  - Urine cx growing multiple organisms, none in predominance.      Insulin dependent diabetes mellitus    A1c 7.1 10/4/18. Per med list provided by patient, only taking detemir 20 u BID    - Started with detemir 20 u qHS with LDSSI and Accuchecks. BG up to 474; steroids given in ED likely contributing to hyperglycemia. Increased insulin regimen to 20u bid detemir and aspart 10u tidwm (home dose is 20u bid, 16u tidwm). BG  trending low; decreased insulin regimen to 10u bid basal insulin and 5u aspart tidwm.  - Diabetic diet     Discharge planning issues    - During last admission, patient was significant placement issue   - Has [place] Medicaid which would only approve SNF in [place]; however, patient wished to be near daughter  - PT/OT eval and treat- recommend short stay SNF, but pt states her insurance does not approve SNF. She will have home health.     Dehydration    - Per principle problem     Hypomagnesemia    Magnesium 1.0 in ED. s/p 2 g mag sulfate.  - Gave additional 2g mag sulfate IV q2h x 3 doses     History of CVA (cerebrovascular accident)    - Continue ASA, plavix and statin     Closed fracture of right tibia and fibula    - splint in place to RLE     Gastroesophageal reflux disease without esophagitis    - Pantoprazole qD     (HFpEF) heart failure with preserved ejection fraction    - Continue losartan  - Strict I&O and daily weights  - Cardiac diet     CAD (coronary artery disease)    - Continue ASA  and statin       Final Active Diagnoses:    Diagnosis Date Noted POA    PRINCIPAL PROBLEM:  Acute metabolic encephalopathy [G93.41]  Yes    Insulin dependent diabetes mellitus [E11.9, Z79.4] 08/23/2018 Not Applicable    Sacral decubitus ulcer, stage II [L89.152] 11/02/2018 Yes    Hypomagnesemia [E83.42] 11/01/2018 Yes    Dehydration [E86.0] 11/01/2018 Yes    Discharge planning issues [Z02.9] 11/01/2018 Not Applicable    History of CVA (cerebrovascular accident) [Z86.73] 10/04/2018 Not Applicable    Closed fracture of right tibia and fibula [S82.201A, S82.401A] 10/03/2018 Yes    Gastroesophageal reflux disease without esophagitis [K21.9] 09/21/2018 Yes    (HFpEF) heart failure with preserved ejection fraction [I50.30] 09/11/2018 Yes    CAD (coronary artery disease) [I25.10] 08/23/2018 Yes      Problems Resolved During this Admission:       Discharged Condition: stable    Disposition: Home or Self Care    Follow Up:    Patient Instructions:      Diet diabetic     Notify your health care provider if you experience any of the following:     Notify your health care provider if you experience any of the following:  increased confusion or weakness     Notify your health care provider if you experience any of the following:  persistent dizziness, light-headedness, or visual disturbances     Notify your health care provider if you experience any of the following:  worsening rash     Notify your health care provider if you experience any of the following:  severe persistent headache     Notify your health care provider if you experience any of the following:  difficulty breathing or increased cough     Notify your health care provider if you experience any of the following:  redness, tenderness, or signs of infection (pain, swelling, redness, odor or green/yellow discharge around incision site)     Notify your health care provider if you experience any of the following:  severe uncontrolled pain     Notify your  "health care provider if you experience any of the following:  persistent nausea and vomiting or diarrhea     Notify your health care provider if you experience any of the following:  temperature >100.4     Activity as tolerated       Significant Diagnostic Studies: Labs:   BMP:   Recent Labs   Lab 11/03/18  0529 11/04/18  0544   GLU 94 70    144   K 4.0 3.3*   * 109   CO2 27 29   BUN 8 6*   CREATININE 0.9 0.8   CALCIUM 8.4* 8.6*   MG 2.2 1.5*   , CMP   Recent Labs   Lab 11/03/18  0529 11/04/18  0544    144   K 4.0 3.3*   * 109   CO2 27 29   GLU 94 70   BUN 8 6*   CREATININE 0.9 0.8   CALCIUM 8.4* 8.6*   ANIONGAP 5* 6*   ESTGFRAFRICA >60.0 >60.0   EGFRNONAA >60.0 >60.0   , CBC   Recent Labs   Lab 11/03/18  0529 11/04/18  0544   WBC 14.00* 8.95   HGB 7.5* 7.9*   HCT 26.1* 27.8*   * 410*   , Troponin   Recent Labs   Lab 10/31/18  2257   TROPONINI 0.024    and A1C:   Recent Labs   Lab 09/26/18  0401 10/02/18  0522 10/04/18  0302   HGBA1C 6.4* 7.1* 7.1*       Pending Diagnostic Studies:     None         X-Ray Chest AP Portable [[id number]] Resulted: 10/31/18 2346   Order Status: Completed Updated: 10/31/18 2350   Narrative:     EXAMINATION:  XR CHEST AP PORTABLE    CLINICAL HISTORY:  Provided history is "shortness of breath;  ".    TECHNIQUE:  One view of the chest.    COMPARISON:  10/04/2018.    FINDINGS:  Patient is slightly rotated.  Upper abdomen and costophrenic angles are excluded from the field of view.  Cardiac silhouette is stable.  Spinal stimulator device is noted.  Right-sided Port-A-Cath is present with the tip overlying the SVC.  No focal consolidation or detrimental change in lung aeration.   Impression:       No detrimental change when compared with 10/04/2018.      Electronically signed by: Km Larios MD  Date: 10/31/2018  Time: 23:47       Medications:  Reconciled Home Medications:      Medication List      CHANGE how you take these medications    insulin detemir U-100 " 100 unit/mL (3 mL) Inpn pen  Commonly known as:  LEVEMIR FLEXTOUCH  Inject 28 Units into the skin once daily.  What changed:    · how much to take  · when to take this     simvastatin 40 MG tablet  Commonly known as:  ZOCOR  Take 0.5 tablets (20 mg total) by mouth every evening.  What changed:  how much to take        CONTINUE taking these medications    acetaminophen 500 MG tablet  Commonly known as:  TYLENOL  Take 1,000 mg by mouth 2 (two) times daily as needed for Pain.     * albuterol 2.5 mg /3 mL (0.083 %) nebulizer solution  Commonly known as:  PROVENTIL  Take 2.5 mg by nebulization every 6 (six) hours. Rescue     * albuterol 90 mcg/actuation inhaler  Commonly known as:  PROVENTIL/VENTOLIN HFA  Inhale 2 puffs into the lungs every 6 (six) hours as needed for Wheezing or Shortness of Breath. Rescue     albuterol-ipratropium 2.5 mg-0.5 mg/3 mL nebulizer solution  Commonly known as:  DUO-NEB  Take 3 mLs by nebulization every 4 (four) hours as needed for Wheezing or Shortness of Breath. Rescue     aspirin 81 MG EC tablet  Commonly known as:  ECOTRIN  Take 1 tablet (81 mg total) by mouth once daily.     baclofen 10 MG tablet  Commonly known as:  LIORESAL  Take 10 mg by mouth 2 (two) times daily as needed (MUSCLE SPASMS).     BREO ELLIPTA 100-25 mcg/dose diskus inhaler  Generic drug:  fluticasone-vilanterol  Inhale 1 puff into the lungs once daily. Controller     clopidogrel 75 mg tablet  Commonly known as:  PLAVIX  Take 75 mg by mouth once daily.     COZAAR 100 MG tablet  Generic drug:  losartan  Take 100 mg by mouth once daily.     diltiaZEM 180 MG 24 hr capsule  Commonly known as:  CARDIZEM CD  Take 180 mg by mouth once daily.     DULoxetine 60 MG capsule  Commonly known as:  CYMBALTA  Take 60 mg by mouth once daily.     furosemide 40 MG tablet  Commonly known as:  LASIX  Take 40 mg by mouth once daily.     gabapentin 300 MG capsule  Commonly known as:  NEURONTIN  Take 300 mg by mouth once daily.     insulin  lispro 100 unit/mL injection  Commonly known as:  HUMALOG  Inject 16 Units into the skin 3 (three) times daily before meals. +SS     lansoprazole 30 MG capsule  Commonly known as:  PREVACID  Take 30 mg by mouth once daily.     ondansetron 4 MG tablet  Commonly known as:  ZOFRAN  Take 1 tablet (4 mg total) by mouth every 6 (six) hours as needed.     predniSONE 10 MG tablet  Commonly known as:  DELTASONE  Take 10 mg by mouth once daily.     pyridoxine (vitamin B6) 50 MG Tab  Commonly known as:  VITAMIN B-6  Take 1 tablet (50 mg total) by mouth once daily.     SENNA WITH DOCUSATE SODIUM 8.6-50 mg per tablet  Generic drug:  senna-docusate 8.6-50 mg  Take 1 tablet by mouth once daily.     theophylline 300 mg 24 hr capsule  Commonly known as:  THEODUR  Take 300 mg by mouth once daily.     VOLTAREN 1 % Gel  Generic drug:  diclofenac sodium  Apply 2 g topically daily as needed (PAIN).         * This list has 2 medication(s) that are the same as other medications prescribed for you. Read the directions carefully, and ask your doctor or other care provider to review them with you.                Indwelling Lines/Drains at time of discharge:   Lines/Drains/Airways     Central Venous Catheter Line                 Port A Cath Single Lumen right subclavian -- days         Port A Cath Single Lumen 10/04/18 0302 right subclavian 31 days          Pressure Ulcer                 Pressure Injury 09/11/18 1515 medial Sacral spine Stage 2 54 days                Time spent on the discharge of patient: >40 minutes  Patient was seen and examined on the date of discharge and determined to be suitable for discharge.    Omid Castrejon MD  Department of Hospital Medicine  [facility]

## 2018-11-04 NOTE — NURSING
Patient eating breakfast consumed cereal and 2 becons BS  [address] ,Spoke with resident hold for now will address with attendant. Informed patient of medication change

## 2018-11-04 NOTE — PLAN OF CARE
Problem: Patient Care Overview  Goal: Plan of Care Review  Outcome: Ongoing (interventions implemented as appropriate)  Patient turned and repositioned q2h and PRN. Pink smooth tissue to sacrum, protective cream applied. Patient pain and safety monitored q 1-2 hrs this shift. Medicated with PRN acetaminophen x1 this AM with c/o headache. Blood glucose monitored throughout shift. Brace worn AAT to RLE. Dressing intact to right knee. Bedrest maintained. Bed locked and in lowest position. Bed side rails elevated x 2. Call light and personal belongings in reach. Refused bipap this shift, 2L NC worn with SpO2 at >95%, weaned down to 1L NC with no s/s of respiratory distress. Continues on scheduled nebulizer tx.     11/04/18 0649   Coping/Psychosocial   Plan Of Care Reviewed With patient

## 2018-11-04 NOTE — PHARMACY MED REC
Admission Medication Reconciliation - Pharmacy Consult Note    The home medication history was taken by the medication reconciliation technician- Zuri Joyce.  The gathered information has been subsequently reviewed by a clinical pharmacist.     No issues noted with the medication reconciliation.      Potential issues to be addressed PRIOR TO DISCHARGE  o None    PHARMACIST NAME Tim Hicks  EXT [id number]            .    .

## 2018-11-04 NOTE — ASSESSMENT & PLAN NOTE
Initial concern for asthma/COPD exacerbation. Per patient's daughter, patient has a history of CO2 narcosis;however, VBG unremarkable. CXR without consolidation. No leukocytosis or lactic acidosis to raise suspicion of reoccurrence of intra abdominal infection. Glucose only mildly elevated. No uremia.     - Initially treated asthma/COPD exacerbation given presence of wheezing on exam with azithromycin and prednisone. This was discontinued due to resolution of wheezing, pt breathing and satting well on NC.   - Initial tachycardia improved with fluids  - Nieves placed in the ED, no UOP after 2 L IVF (though no NIKHIL on labs)- discontinued nieves.   - Zyprexa ODT 10 BID PRN for agitation  - CT head negative. CXR, KUB negative for acute abnormality.   - UA showing UTI- treating with Ceftriaxone 1g IV q24 x3 days.  - Mental status improved with UTI tx and hydration.   - Blood cx growing GPCs. Started Vancomycin 1500mg qd. Identified as Staph epi contaminant. discontinued vanc.   - F/u repeat blood cxs  - Urine cx growing multiple organisms, none in predominance.

## 2018-11-05 ENCOUNTER — OFFICE VISIT (OUTPATIENT)
Dept: ORTHOPEDICS | Facility: CLINIC | Age: 63
End: 2018-11-05
Payer: MEDICARE

## 2018-11-05 ENCOUNTER — HOSPITAL ENCOUNTER (OUTPATIENT)
Dept: RADIOLOGY | Facility: HOSPITAL | Age: 63
Discharge: HOME | End: 2018-11-05
Attending: PHYSICIAN ASSISTANT
Payer: MEDICARE

## 2018-11-05 VITALS
WEIGHT: 167.31 LBS | HEART RATE: 94 BPM | SYSTOLIC BLOOD PRESSURE: 126 MMHG | HEIGHT: 61 IN | DIASTOLIC BLOOD PRESSURE: 80 MMHG | BODY MASS INDEX: 31.59 KG/M2

## 2018-11-05 DIAGNOSIS — S82.401A CLOSED FRACTURE OF RIGHT TIBIA AND FIBULA, INITIAL ENCOUNTER: Primary | ICD-10-CM

## 2018-11-05 DIAGNOSIS — M89.8X6 TIBIAL PAIN: Primary | ICD-10-CM

## 2018-11-05 DIAGNOSIS — S82.201A CLOSED FRACTURE OF RIGHT TIBIA AND FIBULA, INITIAL ENCOUNTER: Primary | ICD-10-CM

## 2018-11-05 DIAGNOSIS — M89.8X6 TIBIAL PAIN: ICD-10-CM

## 2018-11-05 PROCEDURE — 73590 X-RAY EXAM OF LOWER LEG: CPT | Mod: TC,RT

## 2018-11-05 PROCEDURE — 99999 PR PBB SHADOW E&M-EST. PATIENT-LVL IV: CPT | Mod: PBBFAC,,, | Performed by: PHYSICIAN ASSISTANT

## 2018-11-05 PROCEDURE — 3008F BODY MASS INDEX DOCD: CPT | Mod: S$GLB,,, | Performed by: PHYSICIAN ASSISTANT

## 2018-11-05 PROCEDURE — 73590 X-RAY EXAM OF LOWER LEG: CPT | Mod: 26,RT,, | Performed by: RADIOLOGY

## 2018-11-05 PROCEDURE — 99213 OFFICE O/P EST LOW 20 MIN: CPT | Mod: S$GLB,,, | Performed by: PHYSICIAN ASSISTANT

## 2018-11-05 RX ORDER — HYDROCODONE BITARTRATE AND ACETAMINOPHEN 5; 325 MG/1; MG/1
1 TABLET ORAL
Qty: 42 TABLET | Refills: 0 | Status: SHIPPED | OUTPATIENT
Start: 2018-11-05 | End: 2018-11-07

## 2018-11-06 ENCOUNTER — TELEPHONE (OUTPATIENT)
Dept: ORTHOPEDICS | Facility: CLINIC | Age: 63
End: 2018-11-06

## 2018-11-06 NOTE — TELEPHONE ENCOUNTER
----- Message from Duyen Thompson MA sent at 11/5/2018  1:47 PM CST -----  Can you please get patient scheduled?  ----- Message -----  From: Sarah Lujan PA-C  Sent: 11/5/2018   1:44 PM  To: Duyen Thompson MA    Yes this is fine    ----- Message -----  From: Duyen Thompson MA  Sent: 11/5/2018  12:04 PM  To: Sarah Lujan PA-C    Who would you like patient to see as you are out in 2 weeks. Patient can not come on the 15th ( a few days earlier) is it okay to wait 3 weeks?

## 2018-11-06 NOTE — TELEPHONE ENCOUNTER
Left message for patient to return call.  Regarding appointment on 11/29/18 at 9:45 am and mailed.

## 2018-11-07 ENCOUNTER — TELEPHONE (OUTPATIENT)
Dept: HEPATOLOGY | Facility: CLINIC | Age: 63
End: 2018-11-07

## 2018-11-07 LAB
BACTERIA BLD CULT: NORMAL

## 2018-11-07 RX ORDER — HYDROCODONE BITARTRATE AND ACETAMINOPHEN 5; 325 MG/1; MG/1
1 TABLET ORAL
Qty: 42 TABLET | Refills: 0 | Status: ON HOLD | OUTPATIENT
Start: 2018-11-07 | End: 2019-01-24

## 2018-11-07 NOTE — TELEPHONE ENCOUNTER
Home Health needing orders for wound care on right knee.  [facility] will call ortho for orders since patient will be following with them.

## 2018-11-07 NOTE — TELEPHONE ENCOUNTER
----- Message from Mignon Whalen sent at 11/6/2018  4:29 PM CST -----  Contact: [place]/[facility]/[phone]  Office is calling to see if they can get wound care orders from . They state that they've tried to see if it can go through a PCP but the pt has no PCP. Please advise.          Thanks

## 2018-11-08 NOTE — PROGRESS NOTES
Subjective:      Patient ID: Sheryl Martines is a 63 y.o. female.    Chief Complaint: Leg Problem    HPI    Patient is a 63 y.o. female  with history of DM, osteoporosis and pontine stroke 2012 presents to clinic for follow up for right Non displaced oblique right tibia fracture at metaphysis into diaphysis secondary to falling form a standing height on 10/06/2018 after her knees gave out.  Due to patients comorbid medical conditions and at present her blood sugar is running very high she decided to be treated non-operative. She has been in a knee brace in extension. She stated that she is doing ok. Pain is controlled. She is using a wheelchair to assist with ambulation.      Patient ambulates at baseline with Rollator.   On aspirin and plavix    Review of Systems   Constitution: Negative for chills and fever.   Cardiovascular: Negative for chest pain.   Respiratory: Negative for cough and shortness of breath.    Skin: Negative for color change, dry skin, itching, nail changes, poor wound healing and rash.   Musculoskeletal:        Right leg fracture.    Neurological: Negative for dizziness.   Psychiatric/Behavioral: Negative for altered mental status. The patient is not nervous/anxious.    All other systems reviewed and are negative.        Objective:      General    Constitutional: She is oriented to person, place, and time. She appears well-developed and well-nourished. No distress.   HENT:   Head: Atraumatic.   Eyes: Conjunctivae are normal.   Cardiovascular: Normal rate.    Pulmonary/Chest: Effort normal.   Neurological: She is alert and oriented to person, place, and time.   Psychiatric: She has a normal mood and affect. Her behavior is normal.           Right Knee Exam     Comments:  Presents to clinic with her daughter in wheelchair, splint removed, quarter size wound to anterior lateral knee no signs of infection  TTP top fracture site.       RADS: There is a healing fracture distal fibula.  There is good  alignment and no complication        Assessment:       Encounter Diagnosis   Name Primary?    Closed fracture of right tibia and fibula, initial encounter Yes          Plan:       Discussed plan with patient and family, Continue non-operative treatment  - placed into hinge knee brace, may bend to 90 degrees.   - NWB  - wound dressed, she will do daily dressing changes.   - pain medication: no refill needed  - return to clinic in 2- 3 weeks at time x-ray of her tib fib is needed. At teim consider advance weightbearing.

## 2018-11-09 LAB
BACTERIA BLD CULT: NORMAL

## 2018-11-29 ENCOUNTER — OFFICE VISIT (OUTPATIENT)
Dept: ORTHOPEDICS | Facility: CLINIC | Age: 63
End: 2018-11-29
Payer: MEDICARE

## 2018-11-29 ENCOUNTER — HOSPITAL ENCOUNTER (OUTPATIENT)
Dept: RADIOLOGY | Facility: HOSPITAL | Age: 63
Discharge: HOME | End: 2018-11-29
Attending: PHYSICIAN ASSISTANT
Payer: MEDICARE

## 2018-11-29 VITALS — WEIGHT: 167.31 LBS | BODY MASS INDEX: 31.59 KG/M2 | HEIGHT: 61 IN

## 2018-11-29 DIAGNOSIS — S82.401D CLOSED FRACTURE OF RIGHT TIBIA AND FIBULA WITH ROUTINE HEALING, SUBSEQUENT ENCOUNTER: Primary | ICD-10-CM

## 2018-11-29 DIAGNOSIS — T14.90XA TRAUMA: ICD-10-CM

## 2018-11-29 DIAGNOSIS — S82.201D CLOSED FRACTURE OF RIGHT TIBIA AND FIBULA WITH ROUTINE HEALING, SUBSEQUENT ENCOUNTER: Primary | ICD-10-CM

## 2018-11-29 PROCEDURE — 3008F BODY MASS INDEX DOCD: CPT | Mod: S$GLB,,, | Performed by: PHYSICIAN ASSISTANT

## 2018-11-29 PROCEDURE — 99999 PR PBB SHADOW E&M-EST. PATIENT-LVL IV: CPT | Mod: PBBFAC,,, | Performed by: PHYSICIAN ASSISTANT

## 2018-11-29 PROCEDURE — 73590 X-RAY EXAM OF LOWER LEG: CPT | Mod: TC,RT

## 2018-11-29 PROCEDURE — 99213 OFFICE O/P EST LOW 20 MIN: CPT | Mod: S$GLB,,, | Performed by: PHYSICIAN ASSISTANT

## 2018-11-29 PROCEDURE — 73590 X-RAY EXAM OF LOWER LEG: CPT | Mod: 26,RT,, | Performed by: RADIOLOGY

## 2018-11-29 NOTE — PROGRESS NOTES
Subjective:      Patient ID: Sheryl Martines is a 63 y.o. female.    Chief Complaint: Follow-up (right tibia)    HPI    Patient is a 63 y.o. female  with history of DM, osteoporosis and pontine stroke 2012 presents to clinic for follow up for right Non displaced oblique right tibia fracture at metaphysis into diaphysis secondary to falling form a standing height on 10/06/2018 after her knees gave out.  Due to patients comorbid medical conditions and at present her blood sugar is running very high she decided to be treated non-operative. She has been in a knee brace in extension. She stated that she is doing ok. Pain is controlled. She is using a wheelchair to assist with ambulation.      Patient ambulates at baseline with Rollator.   On aspirin and plavix    Review of Systems   Constitution: Negative for chills and fever.   Cardiovascular: Negative for chest pain.   Respiratory: Negative for cough and shortness of breath.    Skin: Negative for color change, dry skin, itching, nail changes, poor wound healing and rash.   Musculoskeletal:        Right leg fracture.    Neurological: Negative for dizziness.   Psychiatric/Behavioral: Negative for altered mental status. The patient is not nervous/anxious.    All other systems reviewed and are negative.        Objective:      General    Constitutional: She is oriented to person, place, and time. She appears well-developed and well-nourished. No distress.   HENT:   Head: Atraumatic.   Eyes: Conjunctivae are normal.   Cardiovascular: Normal rate.    Pulmonary/Chest: Effort normal.   Neurological: She is alert and oriented to person, place, and time.   Psychiatric: She has a normal mood and affect. Her behavior is normal.           Right Knee Exam     Comments:  Presents to clinic with her daughter in wheelchair, brace in palce, quarter size wound to anterior lateral knee no signs of infection  TTP top fracture site.       RADS: There are nondisplaced fractures proximal  tibia proximal fibula and distal fibula.  There is good alignment no complication.        Assessment:       Encounter Diagnoses   Name Primary?    Closed fracture of right tibia and fibula with routine healing, subsequent encounter Yes    Trauma           Plan:       Discussed plan with patient and family, Continue non-operative treatment  - phinge knee brace, may begin to wean  -slowly advance weightbearing, explained to not cause any pain  - wound dressed, she will do daily dressing changes.   - pain medication: no refill needed  - discussed proper nutrition  - She is to follow up with PCP regarding her stomach issues.   - return to clinic in 6 weeks at time x-ray of her tib fib is needed. Sooner is any problems or concerns

## 2018-12-02 ENCOUNTER — HOSPITAL ENCOUNTER (EMERGENCY)
Facility: HOSPITAL | Age: 63
Discharge: HOME | End: 2018-12-03
Attending: EMERGENCY MEDICINE
Payer: MEDICARE

## 2018-12-02 DIAGNOSIS — I95.9 HYPOTENSION, UNSPECIFIED HYPOTENSION TYPE: Primary | ICD-10-CM

## 2018-12-02 DIAGNOSIS — Z87.81 HISTORY OF FRACTURE OF LOWER EXTREMITY: ICD-10-CM

## 2018-12-02 DIAGNOSIS — R53.1 WEAKNESS: ICD-10-CM

## 2018-12-02 DIAGNOSIS — E83.42 HYPOMAGNESEMIA: ICD-10-CM

## 2018-12-02 LAB
ALBUMIN SERPL BCP-MCNC: 2.3 G/DL
ALLENS TEST: ABNORMAL
ALP SERPL-CCNC: 311 U/L
ALT SERPL W/O P-5'-P-CCNC: 7 U/L
ANION GAP SERPL CALC-SCNC: 16 MMOL/L
AST SERPL-CCNC: 38 U/L
BASOPHILS # BLD AUTO: 0.05 K/UL
BASOPHILS NFR BLD: 0.9 %
BILIRUB SERPL-MCNC: 0.6 MG/DL
BUN SERPL-MCNC: 3 MG/DL
CALCIUM SERPL-MCNC: 9.9 MG/DL
CHLORIDE SERPL-SCNC: 95 MMOL/L
CO2 SERPL-SCNC: 22 MMOL/L
CREAT SERPL-MCNC: 1.2 MG/DL
DELSYS: ABNORMAL
DIFFERENTIAL METHOD: ABNORMAL
EOSINOPHIL # BLD AUTO: 0 K/UL
EOSINOPHIL NFR BLD: 0 %
ERYTHROCYTE [DISTWIDTH] IN BLOOD BY AUTOMATED COUNT: 18.6 %
ERYTHROCYTE [SEDIMENTATION RATE] IN BLOOD BY WESTERGREN METHOD: 13 MM/H
EST. GFR  (AFRICAN AMERICAN): 55.6 ML/MIN/1.73 M^2
EST. GFR  (NON AFRICAN AMERICAN): 48.2 ML/MIN/1.73 M^2
FIO2: 28
FLOW: 2
FLUAV AG SPEC QL IA: NEGATIVE
FLUBV AG SPEC QL IA: NEGATIVE
GLUCOSE SERPL-MCNC: 220 MG/DL
HCO3 UR-SCNC: 26.4 MMOL/L (ref 24–28)
HCT VFR BLD AUTO: 32.6 %
HGB BLD-MCNC: 10.1 G/DL
IMM GRANULOCYTES # BLD AUTO: 0.01 K/UL
IMM GRANULOCYTES NFR BLD AUTO: 0.2 %
LACTATE SERPL-SCNC: 2.4 MMOL/L
LDH SERPL L TO P-CCNC: 1.92 MMOL/L (ref 0.5–2.2)
LYMPHOCYTES # BLD AUTO: 1.1 K/UL
LYMPHOCYTES NFR BLD: 21 %
MAGNESIUM SERPL-MCNC: 1.1 MG/DL
MCH RBC QN AUTO: 26.2 PG
MCHC RBC AUTO-ENTMCNC: 31 G/DL
MCV RBC AUTO: 85 FL
MODE: ABNORMAL
MONOCYTES # BLD AUTO: 0.1 K/UL
MONOCYTES NFR BLD: 2.6 %
NEUTROPHILS # BLD AUTO: 4.1 K/UL
NEUTROPHILS NFR BLD: 75.3 %
NRBC BLD-RTO: 0 /100 WBC
PCO2 BLDA: 48.8 MMHG (ref 35–45)
PH SMN: 7.34 [PH] (ref 7.35–7.45)
PHOSPHATE SERPL-MCNC: 4.3 MG/DL
PLATELET # BLD AUTO: 390 K/UL
PMV BLD AUTO: 9.7 FL
PO2 BLDA: 30 MMHG (ref 40–60)
POC BE: 1 MMOL/L
POC SATURATED O2: 54 % (ref 95–100)
POC TCO2: 28 MMOL/L (ref 24–29)
POTASSIUM SERPL-SCNC: 4.7 MMOL/L
PROCALCITONIN SERPL IA-MCNC: 0.08 NG/ML
PROT SERPL-MCNC: 6.4 G/DL
RBC # BLD AUTO: 3.85 M/UL
SAMPLE: ABNORMAL
SITE: ABNORMAL
SODIUM SERPL-SCNC: 133 MMOL/L
SP02: 100
SPECIMEN SOURCE: NORMAL
TROPONIN I SERPL DL<=0.01 NG/ML-MCNC: 0.03 NG/ML
WBC # BLD AUTO: 5.38 K/UL

## 2018-12-02 PROCEDURE — 27000221 HC OXYGEN, UP TO 24 HOURS

## 2018-12-02 PROCEDURE — 93005 ELECTROCARDIOGRAM TRACING: CPT

## 2018-12-02 PROCEDURE — 96361 HYDRATE IV INFUSION ADD-ON: CPT

## 2018-12-02 PROCEDURE — 96366 THER/PROPH/DIAG IV INF ADDON: CPT

## 2018-12-02 PROCEDURE — 87040 BLOOD CULTURE FOR BACTERIA: CPT | Mod: 59

## 2018-12-02 PROCEDURE — 83605 ASSAY OF LACTIC ACID: CPT

## 2018-12-02 PROCEDURE — 93010 ELECTROCARDIOGRAM REPORT: CPT | Mod: ,,, | Performed by: INTERNAL MEDICINE

## 2018-12-02 PROCEDURE — 87427 SHIGA-LIKE TOXIN AG IA: CPT | Mod: 59

## 2018-12-02 PROCEDURE — 80053 COMPREHEN METABOLIC PANEL: CPT

## 2018-12-02 PROCEDURE — 63600175 PHARM REV CODE 636 W HCPCS: Performed by: PHYSICIAN ASSISTANT

## 2018-12-02 PROCEDURE — 99900035 HC TECH TIME PER 15 MIN (STAT)

## 2018-12-02 PROCEDURE — 85025 COMPLETE CBC W/AUTO DIFF WBC: CPT

## 2018-12-02 PROCEDURE — 25000003 PHARM REV CODE 250: Performed by: PHYSICIAN ASSISTANT

## 2018-12-02 PROCEDURE — 25500020 PHARM REV CODE 255: Performed by: EMERGENCY MEDICINE

## 2018-12-02 PROCEDURE — 83735 ASSAY OF MAGNESIUM: CPT

## 2018-12-02 PROCEDURE — 87045 FECES CULTURE AEROBIC BACT: CPT

## 2018-12-02 PROCEDURE — 84100 ASSAY OF PHOSPHORUS: CPT

## 2018-12-02 PROCEDURE — 96365 THER/PROPH/DIAG IV INF INIT: CPT | Mod: 59

## 2018-12-02 PROCEDURE — 87449 NOS EACH ORGANISM AG IA: CPT

## 2018-12-02 PROCEDURE — 87046 STOOL CULTR AEROBIC BACT EA: CPT | Mod: 59

## 2018-12-02 PROCEDURE — 99284 EMERGENCY DEPT VISIT MOD MDM: CPT | Mod: ,,, | Performed by: EMERGENCY MEDICINE

## 2018-12-02 PROCEDURE — 99285 EMERGENCY DEPT VISIT HI MDM: CPT | Mod: 25

## 2018-12-02 PROCEDURE — 84145 PROCALCITONIN (PCT): CPT

## 2018-12-02 PROCEDURE — 87400 INFLUENZA A/B EACH AG IA: CPT | Mod: 59

## 2018-12-02 PROCEDURE — 84484 ASSAY OF TROPONIN QUANT: CPT

## 2018-12-02 PROCEDURE — 82803 BLOOD GASES ANY COMBINATION: CPT

## 2018-12-02 RX ORDER — MAGNESIUM SULFATE HEPTAHYDRATE 40 MG/ML
2 INJECTION, SOLUTION INTRAVENOUS
Status: COMPLETED | OUTPATIENT
Start: 2018-12-02 | End: 2018-12-03

## 2018-12-02 RX ADMIN — IOHEXOL 75 ML: 350 INJECTION, SOLUTION INTRAVENOUS at 10:12

## 2018-12-02 RX ADMIN — SODIUM CHLORIDE 1000 ML: 0.9 INJECTION, SOLUTION INTRAVENOUS at 08:12

## 2018-12-02 RX ADMIN — MAGNESIUM SULFATE IN WATER 2 G: 40 INJECTION, SOLUTION INTRAVENOUS at 11:12

## 2018-12-03 VITALS
OXYGEN SATURATION: 99 % | HEART RATE: 93 BPM | BODY MASS INDEX: 31.62 KG/M2 | HEIGHT: 61 IN | TEMPERATURE: 98 F | RESPIRATION RATE: 19 BRPM | SYSTOLIC BLOOD PRESSURE: 132 MMHG | DIASTOLIC BLOOD PRESSURE: 63 MMHG

## 2018-12-03 LAB
BILIRUB UR QL STRIP: NEGATIVE
BUN SERPL-MCNC: <3 MG/DL (ref 6–30)
C DIFF GDH STL QL: NEGATIVE
C DIFF TOX A+B STL QL IA: NEGATIVE
CHLORIDE SERPL-SCNC: 93 MMOL/L (ref 95–110)
CLARITY UR REFRACT.AUTO: ABNORMAL
COLOR UR AUTO: ABNORMAL
CREAT SERPL-MCNC: 0.9 MG/DL (ref 0.5–1.4)
GLUCOSE SERPL-MCNC: 225 MG/DL (ref 70–110)
GLUCOSE UR QL STRIP: ABNORMAL
HCT VFR BLD CALC: 30 %PCV (ref 36–54)
HGB UR QL STRIP: ABNORMAL
KETONES UR QL STRIP: ABNORMAL
LACTATE SERPL-SCNC: 1.1 MMOL/L
LEUKOCYTE ESTERASE UR QL STRIP: ABNORMAL
MICROSCOPIC COMMENT: ABNORMAL
NITRITE UR QL STRIP: NEGATIVE
PH UR STRIP: 8 [PH] (ref 5–8)
POC IONIZED CALCIUM: 1.18 MMOL/L (ref 1.06–1.42)
POC TCO2 (MEASURED): 27 MMOL/L (ref 23–29)
POTASSIUM BLD-SCNC: 4.1 MMOL/L (ref 3.5–5.1)
PROT UR QL STRIP: NEGATIVE
RBC #/AREA URNS AUTO: 11 /HPF (ref 0–4)
SAMPLE: ABNORMAL
SODIUM BLD-SCNC: 135 MMOL/L (ref 136–145)
SP GR UR STRIP: 1.01 (ref 1–1.03)
SQUAMOUS #/AREA URNS AUTO: 7 /HPF
URN SPEC COLLECT METH UR: ABNORMAL
WBC #/AREA URNS AUTO: 10 /HPF (ref 0–5)

## 2018-12-03 PROCEDURE — 83605 ASSAY OF LACTIC ACID: CPT

## 2018-12-03 PROCEDURE — 25000003 PHARM REV CODE 250: Performed by: PHYSICIAN ASSISTANT

## 2018-12-03 PROCEDURE — 81001 URINALYSIS AUTO W/SCOPE: CPT

## 2018-12-03 RX ADMIN — SODIUM CHLORIDE 1000 ML: 0.9 INJECTION, SOLUTION INTRAVENOUS at 12:12

## 2018-12-03 NOTE — DISCHARGE INSTRUCTIONS
Please hydrate by drinking plenty of water. Please return to the ED for new, worsening, or concerning symptoms.    Our goal in the emergency department is to always give you outstanding care and exceptional service. You may receive a survey by mail or e-mail in the next week regarding your experience in our ED. We would greatly appreciate your completing and returning the survey. Your feedback provides us with a way to recognize our staff who give very good care and it helps us learn how to improve when your experience was below our aspiration of excellence.

## 2018-12-03 NOTE — ED NOTES
Pt. Resting comfortably in room with family at bs. No acute distress noted at this time, on cardiac and o2 monitor, call light within reach, will continue to monitor. Awaiting labs/rads for dispo.

## 2018-12-03 NOTE — ED TRIAGE NOTES
"Pt's daughter called EMS because Pt "couldn't stay awake." Easy to arouse, but constantly fatigued starting at 5:00pm. Systolic BP in 60's in ambulance; 80's when aroused. Hx of diarrhea.  "

## 2018-12-03 NOTE — ED PROVIDER NOTES
Encounter Date: 12/2/2018       History     Chief Complaint   Patient presents with    Hypotension     Presents to ED via EMS c/o fatigue since 1700. + diarrhea. Upon EMS arrival, pt became hypotensive SBP in 60s.      63 year old female with medical history of HTN, COPD, HFpEF, CAD, DMII, Hx of CVA on Plavix, Recent appendectomy 8/2018 c/b post surgical infection and C. Diff infection presenting to the ED with the chief complaint of hypotension. Patient recently discharged on 11/4 after extended hospital stay for UTI and encephalopathy. Recommendations for SNF placement, but limited placement options 2/2 insurance barrier and ultimately discharged with home health. Patient reports developing lightheadedness and dizziness earlier today after standing up to use the restroom. She reports associated diarrhea that began today (2-3 episodes of non-bloody loose stool). She reports recent right tib/fib fracture and currently wearing a RLE brace. She reports having discharge on her eyelids at times that she contributes to iritis. She denies fever, unilateral extremity weakness, chest pain, SOB, emesis, dysuria, hematuria, melena, hematochezia. She is not on antibiotics currently.           Review of patient's allergies indicates:   Allergen Reactions    Ace inhibitors Swelling    Hydralazine analogues     Tetracyclines Swelling    Travatan (with benzalkonium) [travoprost (benzalkonium)]      Past Medical History:   Diagnosis Date    Asthma     Closed compression fracture of fourth lumbar vertebra     COPD (chronic obstructive pulmonary disease)     Coronary artery disease     Diabetes mellitus     Glaucoma     High cholesterol     Hypertension     Iritis     Pulmonary embolus     Stroke     rt sided weakness.     Past Surgical History:   Procedure Laterality Date    ABDOMINAL SURGERY      APPENDECTOMY N/A 8/26/2018    Procedure: APPENDECTOMY;  Surgeon: Bernadine Melendrez MD;  Location: [facility] OR;  Service:  General;  Laterality: N/A;    APPENDECTOMY N/A 8/26/2018    Performed by [facility]rez [facility] at [facility] OR    APPENDECTOMY, LAPAROSCOPIC---CONVERTED TO OPEN APPENDECTOMY @0950 N/A 8/26/2018    Performed by [facility] at [facility] OR    BACK SURGERY      stimulator    CATARACT EXTRACTION      HYSTERECTOMY      LAPAROSCOPIC APPENDECTOMY N/A 8/26/2018    Procedure: APPENDECTOMY, LAPAROSCOPIC---CONVERTED TO OPEN APPENDECTOMY @0950;  Surgeon: Bernadine Melendrez MD;  Location: [facility] OR;  Service: General;  Laterality: N/A;     Family History   Problem Relation Age of Onset    Cancer Father     Diabetes Brother     Multiple sclerosis Mother     Lupus Sister      Social History     Tobacco Use    Smoking status: Never Smoker    Smokeless tobacco: Never Used   Substance Use Topics    Alcohol use: No     Frequency: Never    Drug use: No     Review of Systems   Constitutional: Positive for fatigue. Negative for chills, diaphoresis and fever.   HENT: Negative for congestion and sore throat.    Eyes: Negative for pain and redness.   Respiratory: Negative for cough and shortness of breath.    Cardiovascular: Negative for chest pain and palpitations.   Gastrointestinal: Positive for abdominal pain, diarrhea and nausea. Negative for blood in stool and vomiting.   Genitourinary: Negative for dysuria and hematuria.   Musculoskeletal: Negative for back pain, neck pain and neck stiffness.   Skin: Negative for wound.   Neurological: Positive for dizziness and light-headedness.     Physical Exam     Initial Vitals [12/02/18 1935]   BP Pulse Resp Temp SpO2   (!) 93/53 100 20 97.5 °F (36.4 °C) 100 %      MAP       --         Physical Exam    Constitutional: She is not diaphoretic. She has a sickly appearance. No distress.   Sickly appearance   HENT:   Head: Normocephalic and atraumatic.   Mouth/Throat: Oropharynx is clear and moist. No oropharyngeal exudate.   Eyes: Conjunctivae and EOM are normal. Pupils are equal,  round, and reactive to light. No scleral icterus.   Clear discharge over bilateral eyelids and eyelashes   Neck: Normal range of motion. Neck supple.   Cardiovascular: Regular rhythm. Tachycardia present.    Pulmonary/Chest: Breath sounds normal. No respiratory distress.   Abdominal: Soft. There is tenderness.   Musculoskeletal: She exhibits no edema or tenderness.   RLE brace in place   Neurological: She is alert and oriented to person, place, and time.   Skin: Skin is warm and dry.       ED Course   Procedures  Labs Reviewed   CBC W/ AUTO DIFFERENTIAL - Abnormal; Notable for the following components:       Result Value    RBC 3.85 (*)     Hemoglobin 10.1 (*)     Hematocrit 32.6 (*)     MCH 26.2 (*)     MCHC 31.0 (*)     RDW 18.6 (*)     Platelets 390 (*)     Mono # 0.1 (*)     Gran% 75.3 (*)     Mono% 2.6 (*)     All other components within normal limits   COMPREHENSIVE METABOLIC PANEL - Abnormal; Notable for the following components:    Sodium 133 (*)     CO2 22 (*)     Glucose 220 (*)     BUN, Bld 3 (*)     Albumin 2.3 (*)     Alkaline Phosphatase 311 (*)     ALT 7 (*)     eGFR if  55.6 (*)     eGFR if non  48.2 (*)     All other components within normal limits   LACTIC ACID, PLASMA - Abnormal; Notable for the following components:    Lactate (Lactic Acid) 2.4 (*)     All other components within normal limits   URINALYSIS, REFLEX TO URINE CULTURE - Abnormal; Notable for the following components:    Appearance, UA Hazy (*)     Glucose, UA 1+ (*)     Ketones, UA Trace (*)     Occult Blood UA 1+ (*)     Leukocytes, UA 1+ (*)     All other components within normal limits    Narrative:     Preferred Collection Type->Urine, Clean Catch  one urine cup   MAGNESIUM - Abnormal; Notable for the following components:    Magnesium 1.1 (*)     All other components within normal limits   URINALYSIS MICROSCOPIC - Abnormal; Notable for the following components:    RBC, UA 11 (*)     WBC, UA 10 (*)      All other components within normal limits    Narrative:     Preferred Collection Type->Urine, Clean Catch  one urine cup   ISTAT PROCEDURE - Abnormal; Notable for the following components:    POC PH 7.341 (*)     POC PCO2 48.8 (*)     POC PO2 30 (*)     POC SATURATED O2 54 (*)     All other components within normal limits   CULTURE, BLOOD    Narrative:     Aerobic and anaerobic   CULTURE, BLOOD    Narrative:     Aerobic and anaerobic   CLOSTRIDIUM DIFFICILE   CULTURE, STOOL   ENTEROHEMORRHAGIC E.COLI   INFLUENZA A AND B ANTIGEN   PHOSPHORUS   PROCALCITONIN   TROPONIN I   LACTIC ACID, PLASMA          Imaging Results          CT Abdomen Pelvis With Contrast (Final result)  Result time 12/02/18 23:26:05    Final result by Km Larios MD (12/02/18 23:26:05)                 Impression:      Postoperative changes of appendectomy with overall improved inflammatory changes in the right lower quadrant, noting small fluid collections in the right lower quadrant and right hemipelvis which may represent evolving hematomas but are difficult to differentiate from adjacent urinary bladder and bowel.  Sterility of these collections cannot be evaluated with CT.    Stable postoperative and inflammatory changes involving the anterior abdominal wall without new organized fluid collection.    Although evaluation of intrapelvic contents are limited by the patient's left hip prosthesis, there is suspected urinary bladder wall thickening and perivesicular inflammatory change.  Recommend correlation with urinalysis to evaluate for infectious process.    Additional stable findings discussed in the body of the report.      Electronically signed by: Km Larios MD  Date:    12/02/2018  Time:    23:26             Narrative:    EXAMINATION:  CT ABDOMEN PELVIS WITH CONTRAST    CLINICAL HISTORY:  Hypotension, RLQ/RUQ TTP, R/O intra-abdominal infection;    TECHNIQUE:  Low dose axial images, sagittal and coronal reformations were  obtained from the lung bases to the pubic symphysis following the IV administration of 75 mL of Omnipaque 350 .  Oral contrast was not given. Postcontrast images were also obtained in the delayed phase.    COMPARISON:  09/23/2018 and 09/30/2018.    FINDINGS:  Lower Chest:    Linear subsegmental atelectasis at the bases.  Heart size is normal.    Abdomen:    Hepatic steatosis.  Liver is stable in contour without focal mass.  Gallbladder is stable.  Suspect small gallstones.  No intrahepatic biliary ductal dilatation.    Spleen, adrenals, and pancreas are stable without acute finding.    Kidneys are symmetric.  There is a stable right renal hypodensity, likely a cyst.  There is delayed excretion of contrast on the delayed phase images, which could represent medical renal disease.  No hydronephrosis.    Abdominal aorta and IVC are stable.    No small bowel obstruction.    There are inflammatory changes in the right lower quadrant of the abdomen and right hemipelvis, progressively improved when compared with prior studies.  Postoperative changes of appendectomy.  No new organized fluid collection identified, noting limited evaluation of the intrapelvic structures secondary to extensive beam hardening artifact from the patient's left hip prosthesis.  There is a stable fat attenuating structure with mild inflammatory change at the anterior aspect of the left psoas muscle measuring approximately 3.5 x 4.8 cm.  Additionally, there is a rim calcified structure in the left pericolic gutter which could represent a more remote omental infarct.    No bulky adenopathy.    Pelvis:    Evaluation of the pelvis is limited by the patient's left hip prosthesis resulting in significant metallic streak artifact.  There is suspected urinary bladder wall thickening with surrounding inflammatory change.  There are persistent small collections in the pelvis, which have decreased in size when compared with the prior exam.  For example, the  "right pelvic sidewall collection measures approximately 3.5 x 2.1 cm (series 6, image 44; previously 5.7 x 3.3 cm).  There is an additional collection at the superior aspect of the urinary bladder (series 2, image 65 and coronal image 35), which is difficult to differentiate from the adjacent bladder and bowel.    Bones and soft tissues:    Spinal stimulator device noted.  Multiple remote compression deformities are seen in the lower thoracic and lumbar spine.  Small amount of relatively unorganized fluid is noted within the incision in the right lower quadrant abdominal wall (axial series 2, image 60), similar to the prior study.  Mild skin thickening in this region air in subcutaneous edema.  Small fat containing umbilical hernia is stable.                               X-Ray Chest AP Portable (Final result)  Result time 12/02/18 20:49:38    Final result by Km Larios MD (12/02/18 20:49:38)                 Impression:      No acute finding or detrimental change when compared with 10/31/2018.      Electronically signed by: Km Larios MD  Date:    12/02/2018  Time:    20:49             Narrative:    EXAMINATION:  XR CHEST AP PORTABLE    CLINICAL HISTORY:  Provided history is "Sepsis;  ".    TECHNIQUE:  One view of the chest.    COMPARISON:  10/31/2018.    FINDINGS:  Patient is rotated.  Cardiac wires overlie the chest.  Spinal stimulator device is present.  Right-sided Port-A-Cath overlies the SVC.  Relatively low lung volumes.  Cardiac silhouette is stable.  No large focal consolidation or detrimental change in lung aeration.                                       APC / Resident Notes:   63 year old female with medical history of HTN, COPD, HFpEF, CAD, DMII, Hx of CVA on Plavix, Recent appendectomy 8/2018 c/b post surgical infection and C. Diff infection presenting to the ED c/o hypotension and lightheadedness starting today. Recently discharged on 11/4 after extended hospital stay for UTI and " encephalopathy. Not currently on antibiotics. DDx includes but not limited to sepsis, bacteremia, UTI, pneumonia, electrolyte disturbance, symptomatic anemia, C diff infection, intraabdominal infection. Will initiate sepsis protocol and judicious fluid resuscitation 2/2 CHF.     Work-up shows WBC 5.3, H/H 10/32, Procal 0.08, Lactate elevated 2.4, Mg 1.1, UA negative for UTI. Influenza negative. CXR without acute intrathoracic processes. CT shows improved RLQ inflammatory changes. Stable postop and inflammatory changes of anterior abdominal wall without new organized fluid collection.     Patient reports her symptoms improved on reassessment. Repeat LA improved to 1.1 after fluids. BP improved to 132/63. Mg replaced. No infectious etiology seen on work-up and etiology most likely related to dehydration. Patient stable for discharge home. Will follow-up BCx and stool cultures. I have advised the patient to follow-up with their PCP. Return to ED precautions given for new, worsening, or concerning symptoms. I have discussed the care of this patient with my supervising physician.                  Clinical Impression:   The primary encounter diagnosis was Hypotension, unspecified hypotension type. Diagnoses of Weakness, Hypomagnesemia, and History of fracture of lower extremity were also pertinent to this visit.      Disposition:   Disposition: Discharged  Condition: Stable                        Román Graves PA-C  12/03/18 [id number]

## 2018-12-06 LAB — BACTERIA STL CULT: NORMAL

## 2018-12-07 LAB
BACTERIA BLD CULT: NORMAL
BACTERIA BLD CULT: NORMAL

## 2018-12-12 ENCOUNTER — HOSPITAL ENCOUNTER (OUTPATIENT)
Facility: HOSPITAL | Age: 63
Discharge: HOME HEALTH | End: 2018-12-15
Attending: EMERGENCY MEDICINE | Admitting: INTERNAL MEDICINE
Payer: MEDICARE

## 2018-12-12 DIAGNOSIS — N39.0 URINARY TRACT INFECTION WITHOUT HEMATURIA, SITE UNSPECIFIED: ICD-10-CM

## 2018-12-12 DIAGNOSIS — J45.901 EXACERBATION OF ASTHMA, UNSPECIFIED ASTHMA SEVERITY, UNSPECIFIED WHETHER PERSISTENT: ICD-10-CM

## 2018-12-12 DIAGNOSIS — R56.9 SEIZURE: ICD-10-CM

## 2018-12-12 DIAGNOSIS — R10.9 ABDOMINAL PAIN, UNSPECIFIED ABDOMINAL LOCATION: ICD-10-CM

## 2018-12-12 DIAGNOSIS — R06.02 SHORTNESS OF BREATH: ICD-10-CM

## 2018-12-12 DIAGNOSIS — N30.01 ACUTE CYSTITIS WITH HEMATURIA: Primary | ICD-10-CM

## 2018-12-12 DIAGNOSIS — R79.89 ABNORMAL LFTS: ICD-10-CM

## 2018-12-12 PROBLEM — R19.7 DIARRHEA: Status: ACTIVE | Noted: 2018-12-12

## 2018-12-12 LAB
ALBUMIN SERPL BCP-MCNC: 2.4 G/DL
ALLENS TEST: ABNORMAL
ALP SERPL-CCNC: 379 U/L
ALT SERPL W/O P-5'-P-CCNC: 10 U/L
ANION GAP SERPL CALC-SCNC: 16 MMOL/L
AST SERPL-CCNC: 87 U/L
BACTERIA #/AREA URNS AUTO: ABNORMAL /HPF
BASOPHILS # BLD AUTO: 0.11 K/UL
BASOPHILS NFR BLD: 1.4 %
BILIRUB SERPL-MCNC: 1.2 MG/DL
BILIRUB UR QL STRIP: ABNORMAL
BUN SERPL-MCNC: 2 MG/DL
CALCIUM SERPL-MCNC: 10.7 MG/DL
CHLORIDE SERPL-SCNC: 96 MMOL/L
CLARITY UR REFRACT.AUTO: ABNORMAL
CO2 SERPL-SCNC: 24 MMOL/L
COLOR UR AUTO: ABNORMAL
CREAT SERPL-MCNC: 0.9 MG/DL
DIFFERENTIAL METHOD: ABNORMAL
EOSINOPHIL # BLD AUTO: 0.2 K/UL
EOSINOPHIL NFR BLD: 2 %
ERYTHROCYTE [DISTWIDTH] IN BLOOD BY AUTOMATED COUNT: 19.1 %
EST. GFR  (AFRICAN AMERICAN): >60 ML/MIN/1.73 M^2
EST. GFR  (NON AFRICAN AMERICAN): >60 ML/MIN/1.73 M^2
GLUCOSE SERPL-MCNC: 132 MG/DL
GLUCOSE UR QL STRIP: NEGATIVE
HCO3 UR-SCNC: 22 MMOL/L (ref 24–28)
HCT VFR BLD AUTO: 33.4 %
HGB BLD-MCNC: 10.1 G/DL
HGB UR QL STRIP: ABNORMAL
HYALINE CASTS UR QL AUTO: 38 /LPF
IMM GRANULOCYTES # BLD AUTO: 0.04 K/UL
IMM GRANULOCYTES NFR BLD AUTO: 0.5 %
KETONES UR QL STRIP: ABNORMAL
LEUKOCYTE ESTERASE UR QL STRIP: ABNORMAL
LIPASE SERPL-CCNC: 25 U/L
LYMPHOCYTES # BLD AUTO: 1.7 K/UL
LYMPHOCYTES NFR BLD: 21 %
MAGNESIUM SERPL-MCNC: 1.2 MG/DL
MCH RBC QN AUTO: 25.6 PG
MCHC RBC AUTO-ENTMCNC: 30.2 G/DL
MCV RBC AUTO: 85 FL
MICROSCOPIC COMMENT: ABNORMAL
MONOCYTES # BLD AUTO: 0.7 K/UL
MONOCYTES NFR BLD: 8 %
NEUTROPHILS # BLD AUTO: 5.4 K/UL
NEUTROPHILS NFR BLD: 67.1 %
NITRITE UR QL STRIP: NEGATIVE
NRBC BLD-RTO: 0 /100 WBC
PCO2 BLDA: 40.2 MMHG (ref 35–45)
PH SMN: 7.35 [PH] (ref 7.35–7.45)
PH UR STRIP: 5 [PH] (ref 5–8)
PLATELET # BLD AUTO: 327 K/UL
PMV BLD AUTO: 10.2 FL
PO2 BLDA: 97 MMHG (ref 80–100)
POC BE: -4 MMOL/L
POC SATURATED O2: 97 % (ref 95–100)
POC TCO2: 23 MMOL/L (ref 23–27)
POCT GLUCOSE: 189 MG/DL (ref 70–110)
POCT GLUCOSE: 211 MG/DL (ref 70–110)
POTASSIUM SERPL-SCNC: 3.5 MMOL/L
PROT SERPL-MCNC: 6.6 G/DL
PROT UR QL STRIP: ABNORMAL
RBC # BLD AUTO: 3.94 M/UL
RBC #/AREA URNS AUTO: 7 /HPF (ref 0–4)
SAMPLE: ABNORMAL
SITE: ABNORMAL
SODIUM SERPL-SCNC: 136 MMOL/L
SP GR UR STRIP: 1.02 (ref 1–1.03)
SQUAMOUS #/AREA URNS AUTO: 66 /HPF
URN SPEC COLLECT METH UR: ABNORMAL
WBC # BLD AUTO: 8.09 K/UL
WBC #/AREA URNS AUTO: 19 /HPF (ref 0–5)
WBC CLUMPS UR QL AUTO: ABNORMAL

## 2018-12-12 PROCEDURE — S5571 INSULIN DISPOS PEN 3 ML: HCPCS | Performed by: INTERNAL MEDICINE

## 2018-12-12 PROCEDURE — 25000242 PHARM REV CODE 250 ALT 637 W/ HCPCS: Performed by: EMERGENCY MEDICINE

## 2018-12-12 PROCEDURE — 25000003 PHARM REV CODE 250: Performed by: EMERGENCY MEDICINE

## 2018-12-12 PROCEDURE — 27000221 HC OXYGEN, UP TO 24 HOURS

## 2018-12-12 PROCEDURE — 63600175 PHARM REV CODE 636 W HCPCS: Performed by: PHYSICIAN ASSISTANT

## 2018-12-12 PROCEDURE — 87186 SC STD MICRODIL/AGAR DIL: CPT | Mod: 59

## 2018-12-12 PROCEDURE — 85025 COMPLETE CBC W/AUTO DIFF WBC: CPT

## 2018-12-12 PROCEDURE — 82272 OCCULT BLD FECES 1-3 TESTS: CPT

## 2018-12-12 PROCEDURE — 25000242 PHARM REV CODE 250 ALT 637 W/ HCPCS: Performed by: INTERNAL MEDICINE

## 2018-12-12 PROCEDURE — 63600175 PHARM REV CODE 636 W HCPCS: Performed by: EMERGENCY MEDICINE

## 2018-12-12 PROCEDURE — 25000003 PHARM REV CODE 250: Performed by: INTERNAL MEDICINE

## 2018-12-12 PROCEDURE — 96374 THER/PROPH/DIAG INJ IV PUSH: CPT

## 2018-12-12 PROCEDURE — 80053 COMPREHEN METABOLIC PANEL: CPT

## 2018-12-12 PROCEDURE — 87077 CULTURE AEROBIC IDENTIFY: CPT

## 2018-12-12 PROCEDURE — 99284 EMERGENCY DEPT VISIT MOD MDM: CPT | Mod: ,,, | Performed by: EMERGENCY MEDICINE

## 2018-12-12 PROCEDURE — 99220 PR INITIAL OBSERVATION CARE,LEVL III: CPT | Mod: ,,, | Performed by: INTERNAL MEDICINE

## 2018-12-12 PROCEDURE — 93010 ELECTROCARDIOGRAM REPORT: CPT | Mod: ,,, | Performed by: INTERNAL MEDICINE

## 2018-12-12 PROCEDURE — 99900035 HC TECH TIME PER 15 MIN (STAT)

## 2018-12-12 PROCEDURE — G0378 HOSPITAL OBSERVATION PER HR: HCPCS

## 2018-12-12 PROCEDURE — 96361 HYDRATE IV INFUSION ADD-ON: CPT

## 2018-12-12 PROCEDURE — 87040 BLOOD CULTURE FOR BACTERIA: CPT

## 2018-12-12 PROCEDURE — 83735 ASSAY OF MAGNESIUM: CPT

## 2018-12-12 PROCEDURE — 87088 URINE BACTERIA CULTURE: CPT

## 2018-12-12 PROCEDURE — 96375 TX/PRO/DX INJ NEW DRUG ADDON: CPT

## 2018-12-12 PROCEDURE — 63600175 PHARM REV CODE 636 W HCPCS: Performed by: INTERNAL MEDICINE

## 2018-12-12 PROCEDURE — 82803 BLOOD GASES ANY COMBINATION: CPT

## 2018-12-12 PROCEDURE — 94640 AIRWAY INHALATION TREATMENT: CPT

## 2018-12-12 PROCEDURE — 25000003 PHARM REV CODE 250: Performed by: PHYSICIAN ASSISTANT

## 2018-12-12 PROCEDURE — 99285 EMERGENCY DEPT VISIT HI MDM: CPT | Mod: 25

## 2018-12-12 PROCEDURE — 36600 WITHDRAWAL OF ARTERIAL BLOOD: CPT

## 2018-12-12 PROCEDURE — 81001 URINALYSIS AUTO W/SCOPE: CPT

## 2018-12-12 PROCEDURE — 93005 ELECTROCARDIOGRAM TRACING: CPT

## 2018-12-12 PROCEDURE — 87086 URINE CULTURE/COLONY COUNT: CPT

## 2018-12-12 PROCEDURE — 83690 ASSAY OF LIPASE: CPT

## 2018-12-12 PROCEDURE — 82962 GLUCOSE BLOOD TEST: CPT

## 2018-12-12 RX ORDER — RAMELTEON 8 MG/1
8 TABLET ORAL NIGHTLY PRN
Status: DISCONTINUED | OUTPATIENT
Start: 2018-12-12 | End: 2018-12-15 | Stop reason: HOSPADM

## 2018-12-12 RX ORDER — SODIUM CHLORIDE 0.9 % (FLUSH) 0.9 %
5 SYRINGE (ML) INJECTION
Status: DISCONTINUED | OUTPATIENT
Start: 2018-12-12 | End: 2018-12-15 | Stop reason: HOSPADM

## 2018-12-12 RX ORDER — METHYLPREDNISOLONE SOD SUCC 125 MG
125 VIAL (EA) INJECTION
Status: COMPLETED | OUTPATIENT
Start: 2018-12-12 | End: 2018-12-12

## 2018-12-12 RX ORDER — ONDANSETRON 2 MG/ML
4 INJECTION INTRAMUSCULAR; INTRAVENOUS
Status: COMPLETED | OUTPATIENT
Start: 2018-12-12 | End: 2018-12-12

## 2018-12-12 RX ORDER — SIMVASTATIN 40 MG/1
40 TABLET, FILM COATED ORAL NIGHTLY
Status: DISCONTINUED | OUTPATIENT
Start: 2018-12-12 | End: 2018-12-15 | Stop reason: HOSPADM

## 2018-12-12 RX ORDER — INSULIN ASPART 100 [IU]/ML
0-5 INJECTION, SOLUTION INTRAVENOUS; SUBCUTANEOUS
Status: DISCONTINUED | OUTPATIENT
Start: 2018-12-12 | End: 2018-12-15 | Stop reason: HOSPADM

## 2018-12-12 RX ORDER — IBUPROFEN 200 MG
24 TABLET ORAL
Status: DISCONTINUED | OUTPATIENT
Start: 2018-12-12 | End: 2018-12-15 | Stop reason: HOSPADM

## 2018-12-12 RX ORDER — DILTIAZEM HYDROCHLORIDE 180 MG/1
180 CAPSULE, COATED, EXTENDED RELEASE ORAL DAILY
Status: DISCONTINUED | OUTPATIENT
Start: 2018-12-13 | End: 2018-12-15 | Stop reason: HOSPADM

## 2018-12-12 RX ORDER — IPRATROPIUM BROMIDE AND ALBUTEROL SULFATE 2.5; .5 MG/3ML; MG/3ML
3 SOLUTION RESPIRATORY (INHALATION)
Status: COMPLETED | OUTPATIENT
Start: 2018-12-12 | End: 2018-12-12

## 2018-12-12 RX ORDER — ASPIRIN 81 MG/1
81 TABLET ORAL DAILY
Status: DISCONTINUED | OUTPATIENT
Start: 2018-12-13 | End: 2018-12-15 | Stop reason: HOSPADM

## 2018-12-12 RX ORDER — CEFTRIAXONE 1 G/1
1 INJECTION, POWDER, FOR SOLUTION INTRAMUSCULAR; INTRAVENOUS
Status: DISCONTINUED | OUTPATIENT
Start: 2018-12-13 | End: 2018-12-15

## 2018-12-12 RX ORDER — DULOXETIN HYDROCHLORIDE 60 MG/1
60 CAPSULE, DELAYED RELEASE ORAL DAILY
Status: DISCONTINUED | OUTPATIENT
Start: 2018-12-13 | End: 2018-12-15 | Stop reason: HOSPADM

## 2018-12-12 RX ORDER — BISACODYL 10 MG
10 SUPPOSITORY, RECTAL RECTAL DAILY PRN
Status: DISCONTINUED | OUTPATIENT
Start: 2018-12-12 | End: 2018-12-15 | Stop reason: HOSPADM

## 2018-12-12 RX ORDER — LOSARTAN POTASSIUM 50 MG/1
100 TABLET ORAL DAILY
Status: DISCONTINUED | OUTPATIENT
Start: 2018-12-13 | End: 2018-12-14

## 2018-12-12 RX ORDER — CLOPIDOGREL BISULFATE 75 MG/1
75 TABLET ORAL DAILY
Status: DISCONTINUED | OUTPATIENT
Start: 2018-12-13 | End: 2018-12-15 | Stop reason: HOSPADM

## 2018-12-12 RX ORDER — CEFTRIAXONE 1 G/1
1 INJECTION, POWDER, FOR SOLUTION INTRAMUSCULAR; INTRAVENOUS
Status: COMPLETED | OUTPATIENT
Start: 2018-12-12 | End: 2018-12-12

## 2018-12-12 RX ORDER — ACETAMINOPHEN 325 MG/1
650 TABLET ORAL EVERY 6 HOURS PRN
Status: DISCONTINUED | OUTPATIENT
Start: 2018-12-12 | End: 2018-12-15 | Stop reason: HOSPADM

## 2018-12-12 RX ORDER — FLUTICASONE FUROATE AND VILANTEROL 100; 25 UG/1; UG/1
1 POWDER RESPIRATORY (INHALATION) DAILY
Status: DISCONTINUED | OUTPATIENT
Start: 2018-12-13 | End: 2018-12-15 | Stop reason: HOSPADM

## 2018-12-12 RX ORDER — IBUPROFEN 200 MG
16 TABLET ORAL
Status: DISCONTINUED | OUTPATIENT
Start: 2018-12-12 | End: 2018-12-15 | Stop reason: HOSPADM

## 2018-12-12 RX ORDER — IPRATROPIUM BROMIDE AND ALBUTEROL SULFATE 2.5; .5 MG/3ML; MG/3ML
3 SOLUTION RESPIRATORY (INHALATION)
Status: DISCONTINUED | OUTPATIENT
Start: 2018-12-12 | End: 2018-12-15 | Stop reason: HOSPADM

## 2018-12-12 RX ORDER — FUROSEMIDE 40 MG/1
40 TABLET ORAL DAILY
Status: DISCONTINUED | OUTPATIENT
Start: 2018-12-13 | End: 2018-12-14

## 2018-12-12 RX ORDER — KETOROLAC TROMETHAMINE 30 MG/ML
15 INJECTION, SOLUTION INTRAMUSCULAR; INTRAVENOUS ONCE AS NEEDED
Status: COMPLETED | OUTPATIENT
Start: 2018-12-12 | End: 2018-12-12

## 2018-12-12 RX ORDER — POLYETHYLENE GLYCOL 3350 17 G/17G
17 POWDER, FOR SOLUTION ORAL DAILY
Status: DISCONTINUED | OUTPATIENT
Start: 2018-12-13 | End: 2018-12-15 | Stop reason: HOSPADM

## 2018-12-12 RX ORDER — PREDNISONE 20 MG/1
40 TABLET ORAL DAILY
Status: DISCONTINUED | OUTPATIENT
Start: 2018-12-13 | End: 2018-12-15 | Stop reason: HOSPADM

## 2018-12-12 RX ORDER — GLUCAGON 1 MG
1 KIT INJECTION
Status: DISCONTINUED | OUTPATIENT
Start: 2018-12-12 | End: 2018-12-15 | Stop reason: HOSPADM

## 2018-12-12 RX ORDER — ENOXAPARIN SODIUM 100 MG/ML
40 INJECTION SUBCUTANEOUS EVERY 12 HOURS
Status: DISCONTINUED | OUTPATIENT
Start: 2018-12-12 | End: 2018-12-14

## 2018-12-12 RX ORDER — TRAMADOL HYDROCHLORIDE 50 MG/1
50 TABLET ORAL ONCE AS NEEDED
Status: ACTIVE | OUTPATIENT
Start: 2018-12-12 | End: 2018-12-12

## 2018-12-12 RX ORDER — ALBUTEROL SULFATE 90 UG/1
2 AEROSOL, METERED RESPIRATORY (INHALATION) EVERY 6 HOURS PRN
Status: DISCONTINUED | OUTPATIENT
Start: 2018-12-12 | End: 2018-12-15 | Stop reason: HOSPADM

## 2018-12-12 RX ORDER — ONDANSETRON 8 MG/1
8 TABLET, ORALLY DISINTEGRATING ORAL EVERY 8 HOURS PRN
Status: DISCONTINUED | OUTPATIENT
Start: 2018-12-12 | End: 2018-12-13

## 2018-12-12 RX ADMIN — INSULIN DETEMIR 15 UNITS: 100 INJECTION, SOLUTION SUBCUTANEOUS at 09:12

## 2018-12-12 RX ADMIN — PROMETHAZINE HYDROCHLORIDE 6.25 MG: 25 INJECTION INTRAMUSCULAR; INTRAVENOUS at 10:12

## 2018-12-12 RX ADMIN — Medication 125 MG: at 09:12

## 2018-12-12 RX ADMIN — SIMVASTATIN 40 MG: 40 TABLET, FILM COATED ORAL at 09:12

## 2018-12-12 RX ADMIN — INSULIN ASPART 1 UNITS: 100 INJECTION, SOLUTION INTRAVENOUS; SUBCUTANEOUS at 10:12

## 2018-12-12 RX ADMIN — ENOXAPARIN SODIUM 40 MG: 100 INJECTION SUBCUTANEOUS at 09:12

## 2018-12-12 RX ADMIN — ONDANSETRON HYDROCHLORIDE 4 MG: 2 INJECTION INTRAMUSCULAR; INTRAVENOUS at 11:12

## 2018-12-12 RX ADMIN — SODIUM CHLORIDE 1000 ML: 0.9 INJECTION, SOLUTION INTRAVENOUS at 11:12

## 2018-12-12 RX ADMIN — IPRATROPIUM BROMIDE AND ALBUTEROL SULFATE 3 ML: .5; 3 SOLUTION RESPIRATORY (INHALATION) at 12:12

## 2018-12-12 RX ADMIN — KETOROLAC TROMETHAMINE 15 MG: 30 INJECTION, SOLUTION INTRAMUSCULAR at 10:12

## 2018-12-12 RX ADMIN — IPRATROPIUM BROMIDE AND ALBUTEROL SULFATE 3 ML: .5; 3 SOLUTION RESPIRATORY (INHALATION) at 04:12

## 2018-12-12 RX ADMIN — METHYLPREDNISOLONE SODIUM SUCCINATE 125 MG: 125 INJECTION, POWDER, FOR SOLUTION INTRAMUSCULAR; INTRAVENOUS at 12:12

## 2018-12-12 RX ADMIN — SODIUM CHLORIDE 1000 ML: 0.9 INJECTION, SOLUTION INTRAVENOUS at 07:12

## 2018-12-12 RX ADMIN — CEFTRIAXONE SODIUM 1 G: 1 INJECTION, POWDER, FOR SOLUTION INTRAMUSCULAR; INTRAVENOUS at 04:12

## 2018-12-12 RX ADMIN — IPRATROPIUM BROMIDE AND ALBUTEROL SULFATE 3 ML: .5; 3 SOLUTION RESPIRATORY (INHALATION) at 07:12

## 2018-12-12 NOTE — HPI
Pt is a 64 YO lady with HTN, COPD on 2 L home O2,am sthma, HFpEF, CAD, IDDMII, Hx of CVA on Plavix, Recent nondisplaced fractire of tib/fib and appendectomy 8/2018 c/b post surgical infection and C. Diff infection and staph bactremia who presented to ED  With chief complaint of generalized weakness w constant nausea for less than a wek and , NBB emsis, and diarrhea that started last night. She decribes the diarrhea as watery and has had 5 episodes today. She marcelo any sick person exposure or new abx.  Patient started wheezing this morning with worsening dyspnea that only slightly improved post her home inhalers and has not been feeling at baseline. She denies any fever chills, mylagia, worsening cough, rhinorrhwa, any abdominal pain, or back pain, palpitation, leg swelling.    In Ed she was afebrile with normal vitals. She became tachycardic post duo nebs and steroids injections, she was given 1 L of IVF and started on ceftriaxone for a positive Ua. Lipase was negative and CT renal showed punctuate non obstructive renal stones.

## 2018-12-12 NOTE — SUBJECTIVE & OBJECTIVE
Past Medical History:   Diagnosis Date    Asthma     Closed compression fracture of fourth lumbar vertebra     COPD (chronic obstructive pulmonary disease)     Coronary artery disease     Diabetes mellitus     Glaucoma     High cholesterol     Hypertension     Iritis     Pulmonary embolus     Stroke     rt sided weakness.       Past Surgical History:   Procedure Laterality Date    ABDOMINAL SURGERY      APPENDECTOMY N/A 8/26/2018    Procedure: APPENDECTOMY;  Surgeon: Bernadine Melendrez MD;  Location: [facility] OR;  Service: General;  Laterality: N/A;    APPENDECTOMY N/A 8/26/2018    Performed by [facility]rez MD at [facility] OR    APPENDECTOMY, LAPAROSCOPIC---CONVERTED TO OPEN APPENDECTOMY @0950 N/A 8/26/2018    Performed by [facility]rez MD at [facility] OR    BACK SURGERY      stimulator    CATARACT EXTRACTION      HYSTERECTOMY      LAPAROSCOPIC APPENDECTOMY N/A 8/26/2018    Procedure: APPENDECTOMY, LAPAROSCOPIC---CONVERTED TO OPEN APPENDECTOMY @0950;  Surgeon: Bernadine Melendrez MD;  Location: [facility] OR;  Service: General;  Laterality: N/A;       Review of patient's allergies indicates:   Allergen Reactions    Ace inhibitors Swelling    Hydralazine analogues     Tetracyclines Swelling    Travatan (with benzalkonium) [travoprost (benzalkonium)]        No current facility-administered medications on file prior to encounter.      Current Outpatient Medications on File Prior to Encounter   Medication Sig    acetaminophen (TYLENOL) 500 MG tablet Take 1,000 mg by mouth 2 (two) times daily as needed for Pain.    albuterol (PROVENTIL) 2.5 mg /3 mL (0.083 %) nebulizer solution Take 2.5 mg by nebulization every 6 (six) hours. Rescue    albuterol (PROVENTIL/VENTOLIN HFA) 90 mcg/actuation inhaler Inhale 2 puffs into the lungs every 6 (six) hours as needed for Wheezing or Shortness of Breath. Rescue    albuterol-ipratropium (DUO-NEB) 2.5 mg-0.5 mg/3 mL nebulizer solution Take 3 mLs by nebulization  every 4 (four) hours as needed for Wheezing or Shortness of Breath. Rescue     aspirin (ECOTRIN) 81 MG EC tablet Take 1 tablet (81 mg total) by mouth once daily.    baclofen (LIORESAL) 10 MG tablet Take 10 mg by mouth 2 (two) times daily as needed (MUSCLE SPASMS).    clopidogrel (PLAVIX) 75 mg tablet Take 75 mg by mouth once daily.    diclofenac sodium (VOLTAREN) 1 % Gel Apply 2 g topically daily as needed (PAIN).    diltiaZEM (CARDIZEM CD) 180 MG 24 hr capsule Take 180 mg by mouth once daily.    DULoxetine (CYMBALTA) 60 MG capsule Take 60 mg by mouth once daily.    fluticasone-vilanterol (BREO ELLIPTA) 100-25 mcg/dose diskus inhaler Inhale 1 puff into the lungs once daily. Controller    furosemide (LASIX) 40 MG tablet Take 40 mg by mouth once daily.     gabapentin (NEURONTIN) 300 MG capsule Take 300 mg by mouth once daily.    HYDROcodone-acetaminophen (NORCO) 5-325 mg per tablet Take 1 tablet by mouth every 4 to 6 hours as needed.    insulin detemir U-100 (LEVEMIR FLEXTOUCH) 100 unit/mL (3 mL) SubQ InPn pen Inject 28 Units into the skin once daily. (Patient taking differently: Inject 20 Units into the skin 2 (two) times daily. )    insulin lispro (HUMALOG) 100 unit/mL injection Inject 16 Units into the skin 3 (three) times daily before meals. +SS    lansoprazole (PREVACID) 30 MG capsule Take 30 mg by mouth once daily.    losartan (COZAAR) 100 MG tablet Take 100 mg by mouth once daily.     ondansetron (ZOFRAN) 4 MG tablet Take 1 tablet (4 mg total) by mouth every 6 (six) hours as needed.    predniSONE (DELTASONE) 10 MG tablet Take 10 mg by mouth once daily.     pyridoxine, vitamin B6, (VITAMIN B-6) 50 MG Tab Take 1 tablet (50 mg total) by mouth once daily.    senna-docusate 8.6-50 mg (SENNA WITH DOCUSATE SODIUM) 8.6-50 mg per tablet Take 1 tablet by mouth once daily.     simvastatin (ZOCOR) 40 MG tablet Take 0.5 tablets (20 mg total) by mouth every evening. (Patient taking differently: Take 40 mg  by mouth every evening. )    theophylline (THEODUR) 300 mg 24 hr capsule Take 300 mg by mouth once daily.     Family History     Problem Relation (Age of Onset)    Cancer Father    Diabetes Brother    Lupus Sister    Multiple sclerosis Mother        Tobacco Use    Smoking status: Never Smoker    Smokeless tobacco: Never Used   Substance and Sexual Activity    Alcohol use: No     Frequency: Never    Drug use: No    Sexual activity: No     Partners: Male     Review of Systems   Constitutional: Negative for chills and fever.   HENT: Negative for rhinorrhea, sore throat and trouble swallowing.    Respiratory: Positive for shortness of breath. Negative for cough.    Cardiovascular: Negative for chest pain, palpitations and leg swelling.   Gastrointestinal: Positive for diarrhea. Negative for abdominal pain, constipation and nausea.   Endocrine: Negative for polyuria.   Genitourinary: Negative for dysuria and frequency.   Musculoskeletal: Negative for arthralgias and myalgias.   Skin: Negative for pallor and rash.   Neurological: Positive for weakness. Negative for light-headedness, numbness and headaches.   Psychiatric/Behavioral: Negative for agitation and confusion.     Objective:     Vital Signs (Most Recent):  Temp: 98.9 °F (37.2 °C) (12/12/18 1640)  Pulse: (!) 120 (12/12/18 1701)  Resp: 14 (12/12/18 1701)  BP: 138/82 (12/12/18 1701)  SpO2: 100 % (12/12/18 1701) Vital Signs (24h Range):  Temp:  [98.1 °F (36.7 °C)-98.9 °F (37.2 °C)] 98.9 °F (37.2 °C)  Pulse:  [104-123] 120  Resp:  [14-33] 14  SpO2:  [94 %-100 %] 100 %  BP: (120-158)/(57-91) 138/82        There is no height or weight on file to calculate BMI.    Physical Exam   Constitutional: She is oriented to person, place, and time. She appears well-developed and well-nourished. No distress.   HENT:   Head: Normocephalic and atraumatic.   Mouth/Throat: No oropharyngeal exudate.   Eyes: Right eye exhibits no discharge. Left eye exhibits no discharge. No  scleral icterus.   Neck: Normal range of motion. Neck supple. No tracheal deviation present.   Cardiovascular: Normal rate, regular rhythm and normal heart sounds. Exam reveals no gallop and no friction rub.   Pulmonary/Chest: Effort normal. No respiratory distress. She has no wheezes. She has no rales. She exhibits no tenderness.   Abdominal: Soft. Bowel sounds are normal. She exhibits no distension. There is no tenderness. There is no rebound and no guarding.   Musculoskeletal: She exhibits no tenderness or deformity.   Neurological: She is alert and oriented to person, place, and time.   Skin: Skin is warm and dry. No rash noted. She is not diaphoretic. No erythema.   Psychiatric: She has a normal mood and affect. Her behavior is normal. Thought content normal.        Significant Labs:   Recent Lab Results       12/12/18  1717   12/12/18  1506   12/12/18  1149        Immature Granulocytes     0.5     Immature Grans (Abs)     0.04  Comment:  Mild elevation in immature granulocytes is non specific and   can be seen in a variety of conditions including stress response,   acute inflammation, trauma and pregnancy. Correlation with other   laboratory and clinical findings is essential.       Albumin     2.4     Alkaline Phosphatase     379     Allens Test Pass         ALT     10     Anion Gap     16     Appearance, UA   Cloudy       AST     87     Bacteria, UA   None       Baso #     0.11     Basophil%     1.4     Bilirubin (UA)   1+  Comment:  Positive urine bilirubin is not confirmed. Correlate with   serum bilirubin and clinical presentation.         Total Bilirubin     1.2  Comment:  For infants and newborns, interpretation of results should be based  on gestational age, weight and in agreement with clinical  observations.  Premature Infant recommended reference ranges:  Up to 24 hours.............<8.0 mg/dL  Up to 48 hours............<12.0 mg/dL  3-5 days..................<15.0 mg/dL  6-29  days.................<15.0 mg/dL       Site LR         BUN, Bld     2     Calcium     10.7     Chloride     96     CO2     24     Color, UA   Amanda       Creatinine     0.9     Differential Method     Automated     eGFR if      >60.0     eGFR if non      >60.0  Comment:  Calculation used to obtain the estimated glomerular filtration  rate (eGFR) is the CKD-EPI equation.        Eos #     0.2     Eosinophil%     2.0     Glucose     132     Glucose, UA   Negative       Gran # (ANC)     5.4     Gran%     67.1     Hematocrit     33.4     Hemoglobin     10.1     Hyaline Casts, UA   38       Ketones, UA   3+       Leukocytes, UA   3+       Lipase     25     Lymph #     1.7     Lymph%     21.0     Magnesium     1.2     MCH     25.6     MCHC     30.2     MCV     85     Microscopic Comment   SEE COMMENT  Comment:  Other formed elements not mentioned in the report are not   present in the microscopic examination.          Mono #     0.7     Mono%     8.0     MPV     10.2     Nitrite, UA   Negative       nRBC     0     Occult Blood UA   1+       pH, UA   5.0       Platelets     327     POC BE -4         POC HCO3 22.0         POC PCO2 40.2         POC PH 7.347         POC PO2 97         POC SATURATED O2 97         POC TCO2 23         Potassium     3.5     Total Protein     6.6     Protein, UA   2+  Comment:  Recommend a 24 hour urine protein or a urine   protein/creatinine ratio if globulin induced proteinuria is  clinically suspected.         RBC     3.94     RBC, UA   7       RDW     19.1     Sample ARTERIAL         Sodium     136     Specific Gravity, UA   1.020       Specimen UA   Urine, Catheterized       Squam Epithel, UA   66       WBC Clumps, UA   Many       WBC, UA   19       WBC     8.09           Significant Imaging: I have reviewed all pertinent imaging results/findings within the past 24 hours.

## 2018-12-12 NOTE — ED NOTES
Pt removed from bedpan and cleaned. Pt given blankets, socks placed on, temp in room turned up for comfort. Pt given pillows and repositioned in bed. Call light within reach. Placed back on monitor, VSS, NAD noted.

## 2018-12-12 NOTE — ED PROVIDER NOTES
Encounter Date: 12/12/2018    SCRIBE #1 NOTE: I, Trisha Mannang, am scribing for, and in the presence of,  Freddie Honeycutt MD. I have scribed the entire note.       History     Chief Complaint   Patient presents with    Weakness     loss of appetite, nausea/vomiting, diarrhea-- states EMS     The patient is a 63 y.o. female with co-morbidities including: asthma, CAD, DM, HTN, PE,COPD who presents to the ED with a complaint of generalized weakness. Patient states she has been experiencing constant nausea, vomiting, and diarrhea last night that hasn't resolved. Patient states wheezing started this morning and has not been feeling at baseline. Patient used her inhaler with slight relief. Patient is on home O2 at night, NC 2L. Endorses loss of appetite and abdominal pain. Denies any other associated symptoms. Patient has been ambulatory with wheelchair for the past 8 weeks due to her tib/fib fracture.         The history is provided by the patient.     Review of patient's allergies indicates:   Allergen Reactions    Ace inhibitors Swelling    Hydralazine analogues     Tetracyclines Swelling    Travatan (with benzalkonium) [travoprost (benzalkonium)]      Past Medical History:   Diagnosis Date    Asthma     Closed compression fracture of fourth lumbar vertebra     COPD (chronic obstructive pulmonary disease)     Coronary artery disease     Diabetes mellitus     Glaucoma     High cholesterol     Hypertension     Iritis     Pulmonary embolus     Stroke     rt sided weakness.     Past Surgical History:   Procedure Laterality Date    ABDOMINAL SURGERY      APPENDECTOMY N/A 8/26/2018    Procedure: APPENDECTOMY;  Surgeon: Bernadine Melendrez MD;  Location: [facility] OR;  Service: General;  Laterality: N/A;    APPENDECTOMY N/A 8/26/2018    Performed by Bernadine Melendrez MD at [facility] OR    APPENDECTOMY, LAPAROSCOPIC---CONVERTED TO OPEN APPENDECTOMY @0950 N/A 8/26/2018    Performed by Bernadine Melendrez MD at  [facility] OR    BACK SURGERY      stimulator    CATARACT EXTRACTION      HYSTERECTOMY      LAPAROSCOPIC APPENDECTOMY N/A 8/26/2018    Procedure: APPENDECTOMY, LAPAROSCOPIC---CONVERTED TO OPEN APPENDECTOMY @0950;  Surgeon: Bernadine Melendrez MD;  Location: [facility] OR;  Service: General;  Laterality: N/A;     Family History   Problem Relation Age of Onset    Cancer Father     Diabetes Brother     Multiple sclerosis Mother     Lupus Sister      Social History     Tobacco Use    Smoking status: Never Smoker    Smokeless tobacco: Never Used   Substance Use Topics    Alcohol use: No     Frequency: Never    Drug use: No     Review of Systems   Constitutional: Positive for appetite change. Negative for fever.   HENT: Negative for sore throat.    Respiratory: Positive for wheezing. Negative for shortness of breath.    Cardiovascular: Negative for chest pain.   Gastrointestinal: Positive for abdominal pain, diarrhea, nausea and vomiting.   Genitourinary: Negative for dysuria.   Musculoskeletal: Negative for back pain.   Skin: Negative for rash.   Neurological: Negative for numbness.   Hematological: Does not bruise/bleed easily.       Physical Exam     Initial Vitals [12/12/18 1032]   BP Pulse Resp Temp SpO2   (!) 154/88 110 14 98.9 °F (37.2 °C) 95 %      MAP       --         Physical Exam    Nursing note and vitals reviewed.  Constitutional: Vital signs are normal. She appears well-developed.   Ill- appearing and elderly.   HENT:   Head: Normocephalic and atraumatic.   Mouth/Throat: Oropharynx is clear and moist.   Eyes: Conjunctivae and EOM are normal. Pupils are equal, round, and reactive to light.   Neck: Trachea normal and normal range of motion. Neck supple.   Cardiovascular: Normal rate, regular rhythm, normal heart sounds and normal pulses.   Pulmonary/Chest: Tachypnea noted. No respiratory distress. She has wheezes.   Diffused wheezing   Abdominal: Soft. Normal appearance and bowel sounds are normal. There is  tenderness (flank) in the right lower quadrant. There is no rebound and no guarding.   Musculoskeletal: Normal range of motion. She exhibits no edema or tenderness.   Right lower quadrant has a hinge orthopedic brace   Neurological: She is alert and oriented to person, place, and time.   Skin: Skin is warm and dry.         ED Course   Procedures  Labs Reviewed   CBC W/ AUTO DIFFERENTIAL - Abnormal; Notable for the following components:       Result Value    RBC 3.94 (*)     Hemoglobin 10.1 (*)     Hematocrit 33.4 (*)     MCH 25.6 (*)     MCHC 30.2 (*)     RDW 19.1 (*)     All other components within normal limits   COMPREHENSIVE METABOLIC PANEL - Abnormal; Notable for the following components:    Glucose 132 (*)     BUN, Bld 2 (*)     Calcium 10.7 (*)     Albumin 2.4 (*)     Total Bilirubin 1.2 (*)     Alkaline Phosphatase 379 (*)     AST 87 (*)     All other components within normal limits   MAGNESIUM - Abnormal; Notable for the following components:    Magnesium 1.2 (*)     All other components within normal limits   URINALYSIS, REFLEX TO URINE CULTURE - Abnormal; Notable for the following components:    Appearance, UA Cloudy (*)     Protein, UA 2+ (*)     Ketones, UA 3+ (*)     Bilirubin (UA) 1+ (*)     Occult Blood UA 1+ (*)     Leukocytes, UA 3+ (*)     All other components within normal limits    Narrative:     Preferred Collection Type->Urine, Clean Catch   URINALYSIS MICROSCOPIC - Abnormal; Notable for the following components:    RBC, UA 7 (*)     WBC, UA 19 (*)     WBC Clumps, UA Many (*)     Hyaline Casts, UA 38 (*)     All other components within normal limits    Narrative:     Preferred Collection Type->Urine, Clean Catch   CULTURE, URINE   LIPASE     EKG Readings: (Independently Interpreted)   Sinus tachycardia at 108. Left axis nonspecific ST depression       Imaging Results          CT Renal Stone Study ABD Pelvis WO (Final result)  Result time 12/12/18 14:54:14    Final result by Renato Tejada  MD Rudy (12/12/18 14:54:14)                 Impression:      Postoperative change of appendectomy with persistent inflammatory change in the right lower quadrant with tethering of the right adnexa, large and small bowel, and urinary bladder.  Findings are similar to CT 12/02/2018. While the right ureter is difficult to track in this region, no right-sided hydronephrosis nor renal inflammatory change to suggest obstruction.    Postoperative change of the subcutaneous right lower quadrant anterior abdominal wall tissues with slight decrease in associated fluid.    Punctate nonobstructive right renal stone.    Additional findings as above.    Electronically signed by resident: Josefina Hale  Date:    12/12/2018  Time:    13:30    Electronically signed by: Renato Tejada MD  Date:    12/12/2018  Time:    14:54             Narrative:    EXAMINATION:  CT RENAL STONE STUDY ABD PELVIS WO    CLINICAL HISTORY:  Abdominal pain, unspecified;right flank pain v/d;    TECHNIQUE:  Low dose axial images, sagittal and coronal reformations were obtained from the lung bases to the pubic symphysis without intravenous contrast.  Oral contrast was not administered.    COMPARISON:  CT abdomen pelvis with contrast 12/02/2018, CT abdomen pelvis 08/25/2018    FINDINGS:  - Lung bases: No infiltrates and no nodules.  No pleural fluid.    - Heart/Pericardial structures: Unremarkable.    - EG Junction: No hiatal hernia.    ABDOMEN: Note that non-contrast CT is relatively insensitive for evaluation of the solid organs.    - Liver: The liver is normal in size, however is diffusely hypoattenuating, suggestive of hepatic steatosis.    - Gallbladder: The gallbladder contains layering hyperdense material suggestive of sludge.  Two small focal hyperdensities are compatible with stones.  No wall thickening or pericholecystic fluid.    - Bile Ducts: No intra or extrahepatic biliary ductal dilatation.    - Stomach/Duodenum: Unremarkable.    - Spleen:  Unremarkable.    - Pancreas: There is fatty atrophy of the pancreas.    - Adrenals: Unremarkable.    - Kidneys/ureters/urinary bladder: Normal in size and location.  A punctate 0.2 cm nonobstructive stone lies at the superior left renal pole.  No additional stones identified.  No hydronephrosis.  An ill-defined 1.4 cm hypodensity within the inferior pole of the right kidney demonstrates attenuation compatible with a cyst.  The visualized ureters appear normal in course and caliber without evidence of ureteral dilatation.  The urinary bladder is appears tethered within the right lower quadrant as described below, but is otherwise unremarkable.    - Retroperitoneum: No significant adenopathy.    PELVIS:    - Reproductive: The uterus is absent.    - Other: There is soft tissue thickening within the right adnexal region with dystrophic calcification in tethering of loops of small bowel, sigmoid and ascending colon, and bladder.  There is small volume free fluid in this region.  Findings are not significantly changed since 12/02/2018.  The right ureter is difficult to track in this region.    BOWEL/MESENTERY:    No evidence of bowel obstruction. Postsurgical change at the cecum is compatible with prior appendectomy.  A 4.7 x 3.6 cm fat containing lesion with soft tissue density adjacent to the left psoas musculature is nonspecific, but grossly unchanged since 08/25/2018 suggestive of benign etiology.  No intraperitoneal free air.    VASCULATURE: No abdominal aortic aneurysm. No significant atherosclerotic calcification.    BONES: No acute fracture or bony destructive process. There is postsurgical change of left hip total arthroplasty.  Bony mineralization is poor.  There are multilevel compression deformities involving all lumbar and majority of the visualized thoracic levels, noting relative sparing at T11.  These findings are unchanged since 12/02/2018.  Multilevel vacuum disc phenomenon noted.  Of note, a large disc  bulge at L4-L5 results in at least moderate spinal canal stenosis.    EXTRAPERITONEAL SOFT TISSUES: There is a fat containing umbilical hernia.  There is linear soft tissue density within the right quadrant with decreased volume associated fluid when compared to 12/02/2018.  A nonspecific rounded soft tissue focus within the subcutaneous tissues overlying the right mid abdomen may correlate with a prior injection site.  A spinal stimulator overlies the left paraspinous musculature at the left flank with a subcutaneous lead extending superiorly and entering the canal at T8-T9.  Partially visualized dystrophic calcification within the breasts.                                 Medical Decision Making:   History:   Old Medical Records: I decided to obtain old medical records.  Independently Interpreted Test(s):   I have ordered and independently interpreted EKG Reading(s) - see prior notes  Clinical Tests:   Lab Tests: Ordered and Reviewed  Radiological Study: Ordered and Reviewed  Medical Tests: Ordered and Reviewed  ED Management:  Emergent evaluation of vomiting and asthma exacerbation. Would consider asthma as a viral illness. Patient is also at risk for PE due to recent PE. Initial plan, will check labs and give breathing treatment. Will get CT abdomin to assess abdominal pain.             Scribe Attestation:   Scribe #1: I performed the above scribed service and the documentation accurately describes the services I performed. I attest to the accuracy of the note.    Attending Attestation:             Attending ED Notes:   Patient is requiring repeated nebs but is improving.   Labs demonstrate a UTI, antibiotics started. CT with chronic changes of the RLQ. Will admit for further management.              Clinical Impression:   The primary encounter diagnosis was Exacerbation of asthma, unspecified asthma severity, unspecified whether persistent. Diagnoses of Shortness of breath, Abdominal pain, unspecified abdominal  location, and Urinary tract infection without hematuria, site unspecified were also pertinent to this visit.      Disposition:   Disposition: Admitted  Condition: Germain Honeycutt MD  12/12/18 8224

## 2018-12-12 NOTE — ED TRIAGE NOTES
Patient reports with c/o loss of appetite x several months with decreased PO intake. Patient also reports nausea, vomiting, and diarrhea that started this morning. Patient states that she had an appendectomy in August 2018 and since then has had very little appetite. Patient daughter reports 20-30lb weight loss since procedure. Patient is A&Ox4 and following commands. Patient reports generalized fatigue.

## 2018-12-13 PROBLEM — N18.30 CKD (CHRONIC KIDNEY DISEASE), STAGE III: Status: ACTIVE | Noted: 2018-12-13

## 2018-12-13 LAB
ALBUMIN SERPL BCP-MCNC: 2.2 G/DL
ALP SERPL-CCNC: 293 U/L
ALT SERPL W/O P-5'-P-CCNC: 8 U/L
ANION GAP SERPL CALC-SCNC: 15 MMOL/L
AST SERPL-CCNC: 42 U/L
BASOPHILS # BLD AUTO: 0.01 K/UL
BASOPHILS NFR BLD: 0.1 %
BILIRUB SERPL-MCNC: 0.8 MG/DL
BUN SERPL-MCNC: 3 MG/DL
C DIFF GDH STL QL: NEGATIVE
C DIFF TOX A+B STL QL IA: NEGATIVE
CALCIUM SERPL-MCNC: 9.3 MG/DL
CHLORIDE SERPL-SCNC: 100 MMOL/L
CO2 SERPL-SCNC: 21 MMOL/L
CREAT SERPL-MCNC: 1.2 MG/DL
DIFFERENTIAL METHOD: ABNORMAL
EOSINOPHIL # BLD AUTO: 0 K/UL
EOSINOPHIL NFR BLD: 0 %
ERYTHROCYTE [DISTWIDTH] IN BLOOD BY AUTOMATED COUNT: 18.8 %
EST. GFR  (AFRICAN AMERICAN): 55.6 ML/MIN/1.73 M^2
EST. GFR  (NON AFRICAN AMERICAN): 48.2 ML/MIN/1.73 M^2
ESTIMATED AVG GLUCOSE: 103 MG/DL
GLUCOSE SERPL-MCNC: 204 MG/DL
HBA1C MFR BLD HPLC: 5.2 %
HCT VFR BLD AUTO: 29.4 %
HGB BLD-MCNC: 9.4 G/DL
IMM GRANULOCYTES # BLD AUTO: 0.06 K/UL
IMM GRANULOCYTES NFR BLD AUTO: 0.5 %
LYMPHOCYTES # BLD AUTO: 1.4 K/UL
LYMPHOCYTES NFR BLD: 12.6 %
MAGNESIUM SERPL-MCNC: 1 MG/DL
MCH RBC QN AUTO: 26.6 PG
MCHC RBC AUTO-ENTMCNC: 32 G/DL
MCV RBC AUTO: 83 FL
MONOCYTES # BLD AUTO: 0.3 K/UL
MONOCYTES NFR BLD: 2.2 %
NEUTROPHILS # BLD AUTO: 9.6 K/UL
NEUTROPHILS NFR BLD: 84.6 %
NRBC BLD-RTO: 0 /100 WBC
PHOSPHATE SERPL-MCNC: 2.6 MG/DL
PLATELET # BLD AUTO: 335 K/UL
PMV BLD AUTO: 10.4 FL
POCT GLUCOSE: 188 MG/DL (ref 70–110)
POCT GLUCOSE: 205 MG/DL (ref 70–110)
POCT GLUCOSE: 221 MG/DL (ref 70–110)
POCT GLUCOSE: 222 MG/DL (ref 70–110)
POTASSIUM SERPL-SCNC: 3.7 MMOL/L
PROT SERPL-MCNC: 6 G/DL
RBC # BLD AUTO: 3.54 M/UL
SODIUM SERPL-SCNC: 136 MMOL/L
WBC # BLD AUTO: 11.31 K/UL

## 2018-12-13 PROCEDURE — 95819 EEG AWAKE AND ASLEEP: CPT

## 2018-12-13 PROCEDURE — 27000221 HC OXYGEN, UP TO 24 HOURS

## 2018-12-13 PROCEDURE — 25000242 PHARM REV CODE 250 ALT 637 W/ HCPCS: Performed by: INTERNAL MEDICINE

## 2018-12-13 PROCEDURE — 94640 AIRWAY INHALATION TREATMENT: CPT

## 2018-12-13 PROCEDURE — 99900035 HC TECH TIME PER 15 MIN (STAT)

## 2018-12-13 PROCEDURE — 25000003 PHARM REV CODE 250: Performed by: PHYSICIAN ASSISTANT

## 2018-12-13 PROCEDURE — 85025 COMPLETE CBC W/AUTO DIFF WBC: CPT

## 2018-12-13 PROCEDURE — 84100 ASSAY OF PHOSPHORUS: CPT

## 2018-12-13 PROCEDURE — 87324 CLOSTRIDIUM AG IA: CPT

## 2018-12-13 PROCEDURE — G0378 HOSPITAL OBSERVATION PER HR: HCPCS

## 2018-12-13 PROCEDURE — 83735 ASSAY OF MAGNESIUM: CPT

## 2018-12-13 PROCEDURE — 25000003 PHARM REV CODE 250: Performed by: INTERNAL MEDICINE

## 2018-12-13 PROCEDURE — 95819 EEG AWAKE AND ASLEEP: CPT | Mod: 26,,, | Performed by: PSYCHIATRY & NEUROLOGY

## 2018-12-13 PROCEDURE — 80053 COMPREHEN METABOLIC PANEL: CPT

## 2018-12-13 PROCEDURE — 63600175 PHARM REV CODE 636 W HCPCS: Performed by: INTERNAL MEDICINE

## 2018-12-13 PROCEDURE — 83036 HEMOGLOBIN GLYCOSYLATED A1C: CPT

## 2018-12-13 PROCEDURE — 99225 PR SUBSEQUENT OBSERVATION CARE,LEVEL II: CPT | Mod: ,,, | Performed by: INTERNAL MEDICINE

## 2018-12-13 PROCEDURE — 87040 BLOOD CULTURE FOR BACTERIA: CPT

## 2018-12-13 PROCEDURE — 36415 COLL VENOUS BLD VENIPUNCTURE: CPT

## 2018-12-13 RX ORDER — ONDANSETRON 2 MG/ML
8 INJECTION INTRAMUSCULAR; INTRAVENOUS EVERY 8 HOURS PRN
Status: DISCONTINUED | OUTPATIENT
Start: 2018-12-14 | End: 2018-12-15 | Stop reason: HOSPADM

## 2018-12-13 RX ORDER — ONDANSETRON 2 MG/ML
8 INJECTION INTRAMUSCULAR; INTRAVENOUS EVERY 8 HOURS
Status: COMPLETED | OUTPATIENT
Start: 2018-12-13 | End: 2018-12-13

## 2018-12-13 RX ORDER — MAGNESIUM SULFATE HEPTAHYDRATE 40 MG/ML
2 INJECTION, SOLUTION INTRAVENOUS
Status: COMPLETED | OUTPATIENT
Start: 2018-12-13 | End: 2018-12-13

## 2018-12-13 RX ADMIN — CEFTRIAXONE SODIUM 1 G: 1 INJECTION, POWDER, FOR SOLUTION INTRAMUSCULAR; INTRAVENOUS at 12:12

## 2018-12-13 RX ADMIN — LOSARTAN POTASSIUM 100 MG: 50 TABLET, FILM COATED ORAL at 08:12

## 2018-12-13 RX ADMIN — DULOXETINE 60 MG: 60 CAPSULE, DELAYED RELEASE ORAL at 08:12

## 2018-12-13 RX ADMIN — FLUTICASONE FUROATE AND VILANTEROL TRIFENATATE 1 PUFF: 100; 25 POWDER RESPIRATORY (INHALATION) at 02:12

## 2018-12-13 RX ADMIN — Medication 125 MG: at 05:12

## 2018-12-13 RX ADMIN — IPRATROPIUM BROMIDE AND ALBUTEROL SULFATE 3 ML: .5; 3 SOLUTION RESPIRATORY (INHALATION) at 02:12

## 2018-12-13 RX ADMIN — INSULIN DETEMIR 15 UNITS: 100 INJECTION, SOLUTION SUBCUTANEOUS at 08:12

## 2018-12-13 RX ADMIN — ACETAMINOPHEN 650 MG: 325 TABLET, FILM COATED ORAL at 08:12

## 2018-12-13 RX ADMIN — INSULIN ASPART 1 UNITS: 100 INJECTION, SOLUTION INTRAVENOUS; SUBCUTANEOUS at 09:12

## 2018-12-13 RX ADMIN — CLOPIDOGREL 75 MG: 75 TABLET, FILM COATED ORAL at 09:12

## 2018-12-13 RX ADMIN — MAGNESIUM SULFATE IN WATER 2 G: 40 INJECTION, SOLUTION INTRAVENOUS at 10:12

## 2018-12-13 RX ADMIN — INSULIN ASPART 2 UNITS: 100 INJECTION, SOLUTION INTRAVENOUS; SUBCUTANEOUS at 12:12

## 2018-12-13 RX ADMIN — ENOXAPARIN SODIUM 40 MG: 100 INJECTION SUBCUTANEOUS at 09:12

## 2018-12-13 RX ADMIN — Medication 125 MG: at 11:12

## 2018-12-13 RX ADMIN — IPRATROPIUM BROMIDE AND ALBUTEROL SULFATE 3 ML: .5; 3 SOLUTION RESPIRATORY (INHALATION) at 07:12

## 2018-12-13 RX ADMIN — ASPIRIN 81 MG: 81 TABLET, COATED ORAL at 08:12

## 2018-12-13 RX ADMIN — ONDANSETRON 8 MG: 2 INJECTION INTRAMUSCULAR; INTRAVENOUS at 10:12

## 2018-12-13 RX ADMIN — DILTIAZEM HYDROCHLORIDE 180 MG: 180 CAPSULE, COATED, EXTENDED RELEASE ORAL at 08:12

## 2018-12-13 RX ADMIN — IPRATROPIUM BROMIDE AND ALBUTEROL SULFATE 3 ML: .5; 3 SOLUTION RESPIRATORY (INHALATION) at 08:12

## 2018-12-13 RX ADMIN — ONDANSETRON 8 MG: 2 INJECTION INTRAMUSCULAR; INTRAVENOUS at 02:12

## 2018-12-13 RX ADMIN — INSULIN ASPART 2 UNITS: 100 INJECTION, SOLUTION INTRAVENOUS; SUBCUTANEOUS at 08:12

## 2018-12-13 RX ADMIN — FUROSEMIDE 40 MG: 40 TABLET ORAL at 08:12

## 2018-12-13 RX ADMIN — ONDANSETRON 8 MG: 8 TABLET, ORALLY DISINTEGRATING ORAL at 09:12

## 2018-12-13 RX ADMIN — SIMVASTATIN 40 MG: 40 TABLET, FILM COATED ORAL at 08:12

## 2018-12-13 RX ADMIN — PREDNISONE 40 MG: 20 TABLET ORAL at 05:12

## 2018-12-13 RX ADMIN — ENOXAPARIN SODIUM 40 MG: 100 INJECTION SUBCUTANEOUS at 08:12

## 2018-12-13 RX ADMIN — POLYETHYLENE GLYCOL 3350 17 G: 17 POWDER, FOR SOLUTION ORAL at 08:12

## 2018-12-13 RX ADMIN — MAGNESIUM SULFATE IN WATER 2 G: 40 INJECTION, SOLUTION INTRAVENOUS at 11:12

## 2018-12-13 RX ADMIN — ALBUTEROL SULFATE 2 PUFF: 90 AEROSOL, METERED RESPIRATORY (INHALATION) at 11:12

## 2018-12-13 NOTE — ASSESSMENT & PLAN NOTE
- Stable  - Increased detemir to 18 units for tonight (home is 20 units)  - Continue holding prandial insulin  - LDSSI ordered

## 2018-12-13 NOTE — PLAN OF CARE
Problem: Fall Injury Risk  Goal: Absence of Fall and Fall-Related Injury  Outcome: Ongoing (interventions implemented as appropriate)  Non slip socks on patient's feet. Call light in reach. freq rounds bed in lowest position will help prevent falls.

## 2018-12-13 NOTE — ASSESSMENT & PLAN NOTE
- Improving  - UA dirty, UCx NGTD  - Continue CTX  - Regardless of C. Diff results, will treat with PO vanc as prophylaxis while on antibiotics

## 2018-12-13 NOTE — PLAN OF CARE
Visit with pt to discuss d/c plans.  Pt lives with daughter who assist with her care.  States she is mostly from bed to w/c, has all needs met by daughter.  anticicpates return to home at time of d/c.       12/13/18 9207   Discharge Assessment   Assessment Type Discharge Planning Assessment   Assessment information obtained from? Patient   Expected Length of Stay (days) 3   Communicated expected length of stay with patient/caregiver yes   Prior to hospitilization cognitive status: Alert/Oriented   Prior to hospitalization functional status: Needs Assistance;Assistive Equipment   Current cognitive status: Alert/Oriented   Current Functional Status: Assistive Equipment;Needs Assistance   Lives With child(nandini), adult   Able to Return to Prior Arrangements yes   Is patient able to care for self after discharge? No   Who are your caregiver(s) and their phone number(s)? ([phone] daughter Citlaly)   Readmission Within the Last 30 Days no previous admission in last 30 days   Patient currently receives any other outside agency services? No   Equipment Currently Used at Home none   Do you have any problems affording any of your prescribed medications? No   Is the patient taking medications as prescribed? yes   Does the patient have transportation home? Yes   Transportation Anticipated family or friend will provide   Does the patient receive services at the [facility]? No   Discharge Plan A Home;Home Health  ([facility])   Discharge Plan B Home with family   Patient/Family in Agreement with Plan yes

## 2018-12-13 NOTE — PROGRESS NOTES
Patient left the unit for ultrasound of abdomen. Travelled via stretcher, with 3 litres of oxygen via NC. Not in distress.

## 2018-12-13 NOTE — ASSESSMENT & PLAN NOTE
At home on a regimen of longa cting 20 BID with 16 novolog TID and SSI  I will start w 15 BID and SSI as pt is not able to ntolerate much PO diet

## 2018-12-13 NOTE — ED NOTES
Telemetry Verification   Patient placed on Telemetry Box  Verified by  Box # [id number]   Monitor Tech stella   Rate 115   Rhythm Sinus tach

## 2018-12-13 NOTE — H&P
[facility] Hospital Medicine  History & Physical    Patient Name: Sheryl Martines  MRN: [id number]  Admission Date: 12/12/2018  Attending Physician: Kendrick Celis MD   Primary Care Provider: Primary Doctor [facility] Medicine Team: [facility] O Alexandra Orosco MD     Patient information was obtained from patient, caregiver / friend, past medical records and ER records.     Subjective:     Principal Problem:Acute cystitis with hematuria    Chief Complaint:   Chief Complaint   Patient presents with    Weakness     loss of appetite, nausea/vomiting, diarrhea-- states EMS        HPI: Pt is a 64 YO lady with HTN, COPD on 2 L home O2,am sthma, HFpEF, CAD, IDDMII, Hx of CVA on Plavix, Recent nondisplaced fractire of tib/fib and appendectomy 8/2018 c/b post surgical infection and C. Diff infection and staph bactremia who presented to ED  With chief complaint of generalized weakness w constant nausea for less than a wek and , NBB emsis, and diarrhea that started last night. She decribes the diarrhea as watery and has had 5 episodes today. She marcelo any sick person exposure or new abx.  Patient started wheezing this morning with worsening dyspnea that only slightly improved post her home inhalers and has not been feeling at baseline. She denies any fever chills, mylagia, worsening cough, rhinorrhwa, any abdominal pain, or back pain, palpitation, leg swelling.    In Ed she was afebrile with normal vitals. She became tachycardic post duo nebs and steroids injections, she was given 1 L of IVF and started on ceftriaxone for a positive Ua. Lipase was negative and CT renal showed punctuate non obstructive renal stones.        Past Medical History:   Diagnosis Date    Asthma     Closed compression fracture of fourth lumbar vertebra     COPD (chronic obstructive pulmonary disease)     Coronary artery disease     Diabetes mellitus     Glaucoma     High cholesterol     Hypertension     Iritis      Pulmonary embolus     Stroke     rt sided weakness.       Past Surgical History:   Procedure Laterality Date    ABDOMINAL SURGERY      APPENDECTOMY N/A 8/26/2018    Procedure: APPENDECTOMY;  Surgeon: Bernadine Melendrez MD;  Location: [facility] OR;  Service: General;  Laterality: N/A;    APPENDECTOMY N/A 8/26/2018    Performed by Bernadine Melendrez MD at [facility] OR    APPENDECTOMY, LAPAROSCOPIC---CONVERTED TO OPEN APPENDECTOMY @0950 N/A 8/26/2018    Performed by Bernadine Melendrez MD at [facility] OR    BACK SURGERY      stimulator    CATARACT EXTRACTION      HYSTERECTOMY      LAPAROSCOPIC APPENDECTOMY N/A 8/26/2018    Procedure: APPENDECTOMY, LAPAROSCOPIC---CONVERTED TO OPEN APPENDECTOMY @0950;  Surgeon: Bernadine Melendrez MD;  Location: [facility] OR;  Service: General;  Laterality: N/A;       Review of patient's allergies indicates:   Allergen Reactions    Ace inhibitors Swelling    Hydralazine analogues     Tetracyclines Swelling    Travatan (with benzalkonium) [travoprost (benzalkonium)]        No current facility-administered medications on file prior to encounter.      Current Outpatient Medications on File Prior to Encounter   Medication Sig    acetaminophen (TYLENOL) 500 MG tablet Take 1,000 mg by mouth 2 (two) times daily as needed for Pain.    albuterol (PROVENTIL) 2.5 mg /3 mL (0.083 %) nebulizer solution Take 2.5 mg by nebulization every 6 (six) hours. Rescue    albuterol (PROVENTIL/VENTOLIN HFA) 90 mcg/actuation inhaler Inhale 2 puffs into the lungs every 6 (six) hours as needed for Wheezing or Shortness of Breath. Rescue    albuterol-ipratropium (DUO-NEB) 2.5 mg-0.5 mg/3 mL nebulizer solution Take 3 mLs by nebulization every 4 (four) hours as needed for Wheezing or Shortness of Breath. Rescue     aspirin (ECOTRIN) 81 MG EC tablet Take 1 tablet (81 mg total) by mouth once daily.    baclofen (LIORESAL) 10 MG tablet Take 10 mg by mouth 2 (two) times daily as needed (MUSCLE SPASMS).     clopidogrel (PLAVIX) 75 mg tablet Take 75 mg by mouth once daily.    diclofenac sodium (VOLTAREN) 1 % Gel Apply 2 g topically daily as needed (PAIN).    diltiaZEM (CARDIZEM CD) 180 MG 24 hr capsule Take 180 mg by mouth once daily.    DULoxetine (CYMBALTA) 60 MG capsule Take 60 mg by mouth once daily.    fluticasone-vilanterol (BREO ELLIPTA) 100-25 mcg/dose diskus inhaler Inhale 1 puff into the lungs once daily. Controller    furosemide (LASIX) 40 MG tablet Take 40 mg by mouth once daily.     gabapentin (NEURONTIN) 300 MG capsule Take 300 mg by mouth once daily.    HYDROcodone-acetaminophen (NORCO) 5-325 mg per tablet Take 1 tablet by mouth every 4 to 6 hours as needed.    insulin detemir U-100 (LEVEMIR FLEXTOUCH) 100 unit/mL (3 mL) SubQ InPn pen Inject 28 Units into the skin once daily. (Patient taking differently: Inject 20 Units into the skin 2 (two) times daily. )    insulin lispro (HUMALOG) 100 unit/mL injection Inject 16 Units into the skin 3 (three) times daily before meals. +SS    lansoprazole (PREVACID) 30 MG capsule Take 30 mg by mouth once daily.    losartan (COZAAR) 100 MG tablet Take 100 mg by mouth once daily.     ondansetron (ZOFRAN) 4 MG tablet Take 1 tablet (4 mg total) by mouth every 6 (six) hours as needed.    predniSONE (DELTASONE) 10 MG tablet Take 10 mg by mouth once daily.     pyridoxine, vitamin B6, (VITAMIN B-6) 50 MG Tab Take 1 tablet (50 mg total) by mouth once daily.    senna-docusate 8.6-50 mg (SENNA WITH DOCUSATE SODIUM) 8.6-50 mg per tablet Take 1 tablet by mouth once daily.     simvastatin (ZOCOR) 40 MG tablet Take 0.5 tablets (20 mg total) by mouth every evening. (Patient taking differently: Take 40 mg by mouth every evening. )    theophylline (THEODUR) 300 mg 24 hr capsule Take 300 mg by mouth once daily.     Family History     Problem Relation (Age of Onset)    Cancer Father    Diabetes Brother    Lupus Sister    Multiple sclerosis Mother        Tobacco Use     Smoking status: Never Smoker    Smokeless tobacco: Never Used   Substance and Sexual Activity    Alcohol use: No     Frequency: Never    Drug use: No    Sexual activity: No     Partners: Male     Review of Systems   Constitutional: Negative for chills and fever.   HENT: Negative for rhinorrhea, sore throat and trouble swallowing.    Respiratory: Positive for shortness of breath. Negative for cough.    Cardiovascular: Negative for chest pain, palpitations and leg swelling.   Gastrointestinal: Positive for diarrhea. Negative for abdominal pain, constipation and nausea.   Endocrine: Negative for polyuria.   Genitourinary: Negative for dysuria and frequency.   Musculoskeletal: Negative for arthralgias and myalgias.   Skin: Negative for pallor and rash.   Neurological: Positive for weakness. Negative for light-headedness, numbness and headaches.   Psychiatric/Behavioral: Negative for agitation and confusion.     Objective:     Vital Signs (Most Recent):  Temp: 98.9 °F (37.2 °C) (12/12/18 1640)  Pulse: (!) 120 (12/12/18 1701)  Resp: 14 (12/12/18 1701)  BP: 138/82 (12/12/18 1701)  SpO2: 100 % (12/12/18 1701) Vital Signs (24h Range):  Temp:  [98.1 °F (36.7 °C)-98.9 °F (37.2 °C)] 98.9 °F (37.2 °C)  Pulse:  [104-123] 120  Resp:  [14-33] 14  SpO2:  [94 %-100 %] 100 %  BP: (120-158)/(57-91) 138/82        There is no height or weight on file to calculate BMI.    Physical Exam   Constitutional: She is oriented to person, place, and time. She appears well-developed and well-nourished. No distress.   HENT:   Head: Normocephalic and atraumatic.   Mouth/Throat: No oropharyngeal exudate.   Eyes: Right eye exhibits no discharge. Left eye exhibits no discharge. No scleral icterus.   Neck: Normal range of motion. Neck supple. No tracheal deviation present.   Cardiovascular: Normal rate, regular rhythm and normal heart sounds. Exam reveals no gallop and no friction rub.   Pulmonary/Chest: Effort normal. No respiratory distress. She  has no wheezes. She has no rales. She exhibits no tenderness.   Abdominal: Soft. Bowel sounds are normal. She exhibits no distension. There is no tenderness. There is no rebound and no guarding.   Musculoskeletal: She exhibits no tenderness or deformity.   Neurological: She is alert and oriented to person, place, and time.   Skin: Skin is warm and dry. No rash noted. She is not diaphoretic. No erythema.   Psychiatric: She has a normal mood and affect. Her behavior is normal. Thought content normal.        Significant Labs:   Recent Lab Results       12/12/18  1717   12/12/18  1506   12/12/18  1149        Immature Granulocytes     0.5     Immature Grans (Abs)     0.04  Comment:  Mild elevation in immature granulocytes is non specific and   can be seen in a variety of conditions including stress response,   acute inflammation, trauma and pregnancy. Correlation with other   laboratory and clinical findings is essential.       Albumin     2.4     Alkaline Phosphatase     379     Allens Test Pass         ALT     10     Anion Gap     16     Appearance, UA   Cloudy       AST     87     Bacteria, UA   None       Baso #     0.11     Basophil%     1.4     Bilirubin (UA)   1+  Comment:  Positive urine bilirubin is not confirmed. Correlate with   serum bilirubin and clinical presentation.         Total Bilirubin     1.2  Comment:  For infants and newborns, interpretation of results should be based  on gestational age, weight and in agreement with clinical  observations.  Premature Infant recommended reference ranges:  Up to 24 hours.............<8.0 mg/dL  Up to 48 hours............<12.0 mg/dL  3-5 days..................<15.0 mg/dL  6-29 days.................<15.0 mg/dL       Site LR         BUN, Bld     2     Calcium     10.7     Chloride     96     CO2     24     Color, UA   Amanda       Creatinine     0.9     Differential Method     Automated     eGFR if      >60.0     eGFR if non African American      >60.0  Comment:  Calculation used to obtain the estimated glomerular filtration  rate (eGFR) is the CKD-EPI equation.        Eos #     0.2     Eosinophil%     2.0     Glucose     132     Glucose, UA   Negative       Gran # (ANC)     5.4     Gran%     67.1     Hematocrit     33.4     Hemoglobin     10.1     Hyaline Casts, UA   38       Ketones, UA   3+       Leukocytes, UA   3+       Lipase     25     Lymph #     1.7     Lymph%     21.0     Magnesium     1.2     MCH     25.6     MCHC     30.2     MCV     85     Microscopic Comment   SEE COMMENT  Comment:  Other formed elements not mentioned in the report are not   present in the microscopic examination.          Mono #     0.7     Mono%     8.0     MPV     10.2     Nitrite, UA   Negative       nRBC     0     Occult Blood UA   1+       pH, UA   5.0       Platelets     327     POC BE -4         POC HCO3 22.0         POC PCO2 40.2         POC PH 7.347         POC PO2 97         POC SATURATED O2 97         POC TCO2 23         Potassium     3.5     Total Protein     6.6     Protein, UA   2+  Comment:  Recommend a 24 hour urine protein or a urine   protein/creatinine ratio if globulin induced proteinuria is  clinically suspected.         RBC     3.94     RBC, UA   7       RDW     19.1     Sample ARTERIAL         Sodium     136     Specific Gravity, UA   1.020       Specimen UA   Urine, Catheterized       Squam Epithel, UA   66       WBC Clumps, UA   Many       WBC, UA   19       WBC     8.09           Significant Imaging: I have reviewed all pertinent imaging results/findings within the past 24 hours.    Assessment/Plan:     * Acute cystitis with hematuria    Started on ceftriaxone, will continue for now, FU urin cx and speciaiton       Diarrhea    History of C diff  considering 5 bout of watery diarrhea today I will start PO vanc until cx results       Exacerbation of asthma    S/p duo nebs and methypred injection  Wheezing has resolved, consider 5 days of prednisone 40,  home dose breo and DUo nebs while awake       (HFpEF) heart failure with preserved ejection fraction    Continue home dose diltiazem, asa, losartan       CAD (coronary artery disease)      Continue home dose  Asa, statin     Insulin dependent diabetes mellitus    At home on a regimen of longa cting 20 BID with 16 novolog TID and SSI  I will start w 15 BID and SSI as pt is not able to ntolerate much PO diet         VTE Risk Mitigation (From admission, onward)        Ordered     enoxaparin injection 40 mg  Every 12 hours      12/12/18 1805     IP VTE HIGH RISK PATIENT  Once      12/12/18 1805     Place MARGIE hose  Until discontinued      12/12/18 1805             Alexandra Orosco MD  Department of Hospital Medicine   [facility]

## 2018-12-13 NOTE — SUBJECTIVE & OBJECTIVE
Interval History:     Ms. Martines felt better this morning compared to yesterday, but has not yet returned to her baseline. During my exam she began experiencing an abnormal movement of her right upper extremity, which she says has been happening for the last several months. EEG was ordered that did not show any evidence of epileptiform activity on the prelim report. Awaiting C. Diff results    Review of Systems   Constitutional: Positive for fatigue. Negative for chills and fever.   Respiratory: Negative for cough and shortness of breath.    Cardiovascular: Negative for chest pain and leg swelling.   Gastrointestinal: Positive for diarrhea and nausea. Negative for abdominal pain, constipation and vomiting.   Genitourinary: Negative for difficulty urinating.   Musculoskeletal: Negative for myalgias.   Skin: Negative for rash and wound.   Neurological: Positive for tremors. Negative for weakness and numbness.   Psychiatric/Behavioral: Negative for dysphoric mood. The patient is not nervous/anxious.      Objective:     Vital Signs (Most Recent):  Temp: 96.9 °F (36.1 °C) (12/13/18 1616)  Pulse: 94 (12/13/18 1616)  Resp: 18 (12/13/18 1616)  BP: 132/68 (12/13/18 1616)  SpO2: 100 % (12/13/18 1616) Vital Signs (24h Range):  Temp:  [96.9 °F (36.1 °C)-99.3 °F (37.4 °C)] 96.9 °F (36.1 °C)  Pulse:  [] 94  Resp:  [14-22] 18  SpO2:  [94 %-100 %] 100 %  BP: (131-168)/(68-83) 132/68     Weight: 75.9 kg (167 lb 5.3 oz)  Body mass index is 31.62 kg/m².    Intake/Output Summary (Last 24 hours) at 12/13/2018 1714  Last data filed at 12/13/2018 1500  Gross per 24 hour   Intake 290 ml   Output 30 ml   Net 260 ml      Physical Exam   Constitutional: She is oriented to person, place, and time. No distress.   Eyes: Pupils are equal, round, and reactive to light.   Cardiovascular: Normal rate and regular rhythm.   No murmur heard.  Pulmonary/Chest: Effort normal and breath sounds normal. No respiratory distress. She has no wheezes.    Abdominal: Soft. Bowel sounds are normal. She exhibits no distension. There is no tenderness.   Musculoskeletal: She exhibits no edema or tenderness.   Neurological: She is alert and oriented to person, place, and time. She displays normal reflexes.   Involuntary high frequency tremor of her entire RUE up to her shoulder lasting about 3 minutes   Skin: Skin is warm and dry. No rash noted. She is not diaphoretic. No erythema.   Psychiatric: She has a normal mood and affect. Her behavior is normal.       Significant Labs:     CBC:  Recent Labs   Lab 12/12/18  1149 12/13/18  0536   WBC 8.09 11.31   GRAN 67.1  5.4 84.6*  9.6*   HGB 10.1* 9.4*   HCT 33.4* 29.4*    335       Chem 10:  Recent Labs   Lab 12/12/18  1149 12/13/18  0536    136   K 3.5 3.7   CL 96 100   CO2 24 21*   BUN 2* 3*   CREATININE 0.9 1.2   * 204*   CALCIUM 10.7* 9.3   MG 1.2* 1.0*   PHOS  --  2.6*       LFTs:  Recent Labs   Lab 12/12/18  1149 12/13/18  0536   ALKPHOS 379* 293*   BILITOT 1.2* 0.8   AST 87* 42*   ALT 10 8*   ALBUMIN 2.4* 2.2*       Significant Imaging:   None new

## 2018-12-13 NOTE — PLAN OF CARE
Problem: Adult Inpatient Plan of Care  Goal: Plan of Care Review  Outcome: Ongoing (interventions implemented as appropriate)  No diarrhea overnight, patient asked for bedpan this morning and urinated. Kept on 3 litres of oxygen via NC, O2 saturations of >95%.  No wheezing, coughing occasionally. Position change with assist.  In NSR to sinus tachy at 105 on telemetry. Safety maintained, call bell within reach, no fall or injury occurred overnight. Promoted rest and sleep.

## 2018-12-13 NOTE — PROGRESS NOTES
[facility] Hospital Medicine  Progress Note    Patient Name: Sheryl Martines  MRN: [id number]  Patient Class: OP- Observation   Admission Date: 12/12/2018  Length of Stay: 0 days  Attending Physician: Kendrick Celis MD  Primary Care Provider: Primary Doctor [facility] Medicine Team: [facility] MED O Kendrick Celis MD    Subjective:     Principal Problem:Acute cystitis with hematuria    HPI:  Pt is a 62 YO lady with HTN, COPD on 2 L home O2,am sthma, HFpEF, CAD, IDDMII, Hx of CVA on Plavix, Recent nondisplaced fractire of tib/fib and appendectomy 8/2018 c/b post surgical infection and C. Diff infection and staph bactremia who presented to ED  With chief complaint of generalized weakness w constant nausea for less than a wek and , NBB emsis, and diarrhea that started last night. She decribes the diarrhea as watery and has had 5 episodes today. She marcelo any sick person exposure or new abx.  Patient started wheezing this morning with worsening dyspnea that only slightly improved post her home inhalers and has not been feeling at baseline. She denies any fever chills, mylagia, worsening cough, rhinorrhwa, any abdominal pain, or back pain, palpitation, leg swelling.    In Ed she was afebrile with normal vitals. She became tachycardic post duo nebs and steroids injections, she was given 1 L of IVF and started on ceftriaxone for a positive Ua. Lipase was negative and CT renal showed punctuate non obstructive renal stones.        Hospital Course:  12/12: Admitted to  for OBS, started on CTX for UTI and vanc for possible C. Diff  12/13: Marginal improvement. EEG ordered for abnormal RUE movement, no evidence of seizures. Awaiting C. Diff and urine culture results    Interval History:     Ms. Martines felt better this morning compared to yesterday, but has not yet returned to her baseline. During my exam she began experiencing an abnormal movement of her right upper extremity, which she says has  been happening for the last several months. EEG was ordered that did not show any evidence of epileptiform activity on the prelim report. Awaiting C. Diff results    Review of Systems   Constitutional: Positive for fatigue. Negative for chills and fever.   Respiratory: Negative for cough and shortness of breath.    Cardiovascular: Negative for chest pain and leg swelling.   Gastrointestinal: Positive for diarrhea and nausea. Negative for abdominal pain, constipation and vomiting.   Genitourinary: Negative for difficulty urinating.   Musculoskeletal: Negative for myalgias.   Skin: Negative for rash and wound.   Neurological: Positive for tremors. Negative for weakness and numbness.   Psychiatric/Behavioral: Negative for dysphoric mood. The patient is not nervous/anxious.      Objective:     Vital Signs (Most Recent):  Temp: 96.9 °F (36.1 °C) (12/13/18 1616)  Pulse: 94 (12/13/18 1616)  Resp: 18 (12/13/18 1616)  BP: 132/68 (12/13/18 1616)  SpO2: 100 % (12/13/18 1616) Vital Signs (24h Range):  Temp:  [96.9 °F (36.1 °C)-99.3 °F (37.4 °C)] 96.9 °F (36.1 °C)  Pulse:  [] 94  Resp:  [14-22] 18  SpO2:  [94 %-100 %] 100 %  BP: (131-168)/(68-83) 132/68     Weight: 75.9 kg (167 lb 5.3 oz)  Body mass index is 31.62 kg/m².    Intake/Output Summary (Last 24 hours) at 12/13/2018 1714  Last data filed at 12/13/2018 1500  Gross per 24 hour   Intake 290 ml   Output 30 ml   Net 260 ml      Physical Exam   Constitutional: She is oriented to person, place, and time. No distress.   Eyes: Pupils are equal, round, and reactive to light.   Cardiovascular: Normal rate and regular rhythm.   No murmur heard.  Pulmonary/Chest: Effort normal and breath sounds normal. No respiratory distress. She has no wheezes.   Abdominal: Soft. Bowel sounds are normal. She exhibits no distension. There is no tenderness.   Musculoskeletal: She exhibits no edema or tenderness.   Neurological: She is alert and oriented to person, place, and time. She displays  normal reflexes.   Involuntary high frequency tremor of her entire RUE up to her shoulder lasting about 3 minutes   Skin: Skin is warm and dry. No rash noted. She is not diaphoretic. No erythema.   Psychiatric: She has a normal mood and affect. Her behavior is normal.       Significant Labs:     CBC:  Recent Labs   Lab 12/12/18  1149 12/13/18  0536   WBC 8.09 11.31   GRAN 67.1  5.4 84.6*  9.6*   HGB 10.1* 9.4*   HCT 33.4* 29.4*    335       Chem 10:  Recent Labs   Lab 12/12/18  1149 12/13/18  0536    136   K 3.5 3.7   CL 96 100   CO2 24 21*   BUN 2* 3*   CREATININE 0.9 1.2   * 204*   CALCIUM 10.7* 9.3   MG 1.2* 1.0*   PHOS  --  2.6*       LFTs:  Recent Labs   Lab 12/12/18  1149 12/13/18  0536   ALKPHOS 379* 293*   BILITOT 1.2* 0.8   AST 87* 42*   ALT 10 8*   ALBUMIN 2.4* 2.2*       Significant Imaging:   None new      Assessment/Plan:      * Acute cystitis with hematuria      - Improving  - UA dirty, UCx NGTD  - Continue CTX  - Regardless of C. Diff results, will treat with PO vanc as prophylaxis while on antibiotics     Exacerbation of asthma      - Improving  - Continue prednisone and scheduled duonebs  - No wheezing on examination today     CKD (chronic kidney disease), stage III      - Stable  - Creatinine 1.2 today, on the upper range of her normal  - Will follow with BMP daily, renally dose all meds  - Received Toradol in the ED; no more NSASIDs     Observed seizure-like activity      - Involuntary RUE tremors noted on examination today. By her description these have been occurring for months  - EEG ordered, no evidence of epileptiform activity present on prelim read  - Will continue to monitor  - Epilepsy referral upon discharge     Diarrhea      - Improving  - Hx of C. Diff  - Sample obtained, currently in the lab  - Continue isolation precautions  - Even if it isn't C. Diff, will plan on continuing vanc as long as she is on antibiotics for prophyalxis     Closed fracture of right tibia  and fibula      - Stable  - Managed non-operatively  - PT/OT evals ordered  - f/u with ortho as scheduled     Essential hypertension      - Stable  - Continue home losartan, dilt, and lasix     (HFpEF) heart failure with preserved ejection fraction      - Stable by symptoms and exam  - Continue home lasix     CAD (coronary artery disease)      - Stable  - Continue home aspirin, statin, and losartan     Insulin dependent diabetes mellitus      - Stable  - Increased detemir to 18 units for tonight (home is 20 units)  - Continue holding prandial insulin  - LDSSI ordered       VTE Risk Mitigation (From admission, onward)        Ordered     enoxaparin injection 40 mg  Every 12 hours      12/12/18 1805     IP VTE HIGH RISK PATIENT  Once      12/12/18 1805     Place MARGIE hose  Until discontinued      12/12/18 1805              Kendrick Celis MD  Department of Hospital Medicine   [facility]

## 2018-12-13 NOTE — PROCEDURES
ELECTROENCEPHALOGRAM  REPORT    Sheryl Martines  [id number]  1955    DATE OF SERVICE: 12/31/2018     DATE OF ADMISSION: 12/12/2018 11:10 AM    ADMITTING/REQUESTING PROVIDER: Kendrick Celis MD    REASON FOR CONSULT: R arm movements    METHODOLOGY   Electroencephalographic (EEG) recording is with electrodes placed according to the International 10-20 placement system.  Thirty two (32) channels of digital signal (sampling rate of 512/sec) including T1 and T2 was simultaneously recorded from the scalp and may include  EKG, EMG, and/or eye monitors.  Recording band pass was 0.1 to 512 hz.  Digital video recording of the patient is simultaneously recorded with the EEG.  The patient is instructed report clinical symptoms which may occur during the recording session.  EEG and video recording is stored and archived in digital format.  Activation procedures which include photic stimulation, hyperventilation and instructing patients to perform simple task are done in selected patients.   The EEG is displayed on a monitor screen and can be reviewed using different montages.  Computer assisted analysis is employed to detect spike and electrographic seizure activity.   The entire record is submitted for computer analysis.  The entire recording is visually reviewed and the times identified by computer analysis as being spikes or seizures are reviewed again.  Compresses spectral analysis (CSA) is also performed on the activity recorded from each individual channel.  This is displayed as a power display of frequencies from 0 to 30 Hz over time.   The CSA is reviewed looking for asymmetries in power between homologous areas of the scalp and then compared with the original EEG recording.     [employer] software was also utilized in the review of this study.  This software suite analyzes the EEG recording in multiple domains.  Coherence and rhythmicity is computed to identify EEG sections which may contain organized seizures.   Each channel undergoes analysis to detect presence of spike and sharp waves which have special and morphological characteristic of epileptic activity.  The routine EEG recording is converted from spacial into frequency domain.  This is then displayed comparing homologous areas to identify areas of significant asymmetry.  Algorithm to identify non-cortically generated artifact is used to separate eye movement, EMG and other artifact from the EEG.      RECORDING TIMES  A total of 26 min of VEEG recording was obtained.    EEG FINDINGS  Background activity:   The background rhythm was characterized mixed frequency and predominantly theta/delta range activity with a poorly formed 6 Hz posterior dominant rhythm. The background was continuous and symmetric, variable, and somewhat reactive.    Sleep:    Normal sleep transients including sleep spindles, K complexes, and vertex waves were seen.    Activation procedures:   Not performed    Abnormal activity:   No epileptiform discharges, periodic discharges, lateralized rhythmic delta activity or electrographic seizures were seen.    EKG:   EKG was normal sinus rhythm.     IMPRESSION:     This is an abnormal awake/drowsy EEG recording due to generalized theta/delta slowing.    CLINICAL CORRELATION:  The patient is a 63 year-old female with multiple medical co-morbidities admitted with acute cystitis who is being evaluated for R arm jerking. The patient is currently not maintained on any anti-seizure medications.  This is an abnormal EEG during wakefulness and sleep. There is evidence for cortical dysfunction or an consistent with a moderate encephalopathy.  No seizures were recorded during this study.      Dianna Sweeney MD  Neurology-Epilepsy Fellow.  [facility].      I personally reviewed the EEG study. This report was reviewed and edited by myself.     Dc Grimes MD   Epilepsy Division

## 2018-12-13 NOTE — ASSESSMENT & PLAN NOTE
- Improving  - Hx of C. Diff  - Sample obtained, currently in the lab  - Continue isolation precautions  - Even if it isn't C. Diff, will plan on continuing vanc as long as she is on antibiotics for prophyalxis

## 2018-12-13 NOTE — ED NOTES
Sheryl Martines, a 63 y.o. female presents to the ED via EMS with CC increased SOB, increased fatigue, Nausea and vomiting.       Patient identifiers verified verbally with patient and correct for Sheryl Martines.

## 2018-12-13 NOTE — ASSESSMENT & PLAN NOTE
History of C diff  considering 5 bout of watery diarrhea today I will start PO vanc until cx results

## 2018-12-13 NOTE — ASSESSMENT & PLAN NOTE
- Stable  - Creatinine 1.2 today, on the upper range of her normal  - Will follow with BMP daily, renally dose all meds  - Received Toradol in the ED; no more NSASIDs

## 2018-12-13 NOTE — NURSING
Transferred from ER via stretcher, admitted to room 2. Patient alert and awake, responsive. On 4 litres of oxygen via NC. On temetry monitor, sinus tachycardia at 120bpm. VS taken, BP unremarkable. No wheezing heard on auscultation. Settled in bed, HOB 30-45 degrees, pressure areas intact, small skin break down on abdominal fold by the symphysis pubis and on the groin area, clean with wash cloth and barrier cream applied. MARGIE hose applied, heels are intact. Patient able to roll and shift weight in bed but still needing help with one person, incontinent at time, patient will call for bedpan. Awaiting stool sample for C.Diff test. Isolation/contact precaution initiated.

## 2018-12-13 NOTE — ASSESSMENT & PLAN NOTE
- Involuntary RUE tremors noted on examination today. By her description these have been occurring for months  - EEG ordered, no evidence of epileptiform activity present on prelim read  - Will continue to monitor  - Epilepsy referral upon discharge

## 2018-12-13 NOTE — ASSESSMENT & PLAN NOTE
S/p duo nebs and methypred injection  Wheezing has resolved, consider 5 days of prednisone 40, home dose breo and DUo nebs while awake

## 2018-12-13 NOTE — HOSPITAL COURSE
12/12: Admitted to  for OBS, started on CTX for UTI and vanc for possible C. Diff  12/13: Marginal improvement. EEG ordered for abnormal RUE movement, no evidence of seizures. Awaiting C. Diff and urine culture results  12/14: Feeling better subjectively, now with NIKHIL on CKD. C. Diff negative. EEG negative.  12/15: Back to baseline subjectively, NIKHIL resolved, UCx growing both E. Faecalis and pseudomonas, antibiotics changed to cipro and discharged    In brief, Ms. Martines is a 63-year-old woman who was observed in the hospital for UTI and an asthma exacerbation, thought to be precipitated by viral upper respiratory tract infection. Her respiratory symptoms resolved quickly with prednisone, but she had persistent fatigue and malaise. On admission her UA was dirty, prompting empiric treatment with CTX. Her culture grew both faecalis and pseudomonas; she had already received adequate treatment for the enterococcus, so she was discharged with plans to complete a total of three days of cipro targeted toward the pseudomonas isolate. She has a history of C. Diff (tested negative this admission), for which she is being prescribed flagyl (PO vanc not covered by patient's insurance) to be taken with the antibiotics and two days following as a prophylactic measure. Other details of her admission include transient NIKHIL that resolved with fluids, an episode of involuntary RUE movement that resolved spontaneously for which an EEG was obtained that was negative, and abnormal LFTs with hepatic steatosis on US. She will need to follow-up neuro for the movement and hepatology for the abnormal labs.     Her PCP is in [place], and it has been quite some time since her last visit, with numerous hospitalizations in the interim; will arrange priority care follow-up given this and her overall medical complexity.

## 2018-12-14 PROBLEM — R79.89 ABNORMAL LFTS: Status: ACTIVE | Noted: 2018-12-14

## 2018-12-14 PROBLEM — N17.9 ACUTE RENAL FAILURE SUPERIMPOSED ON STAGE 3 CHRONIC KIDNEY DISEASE: Status: ACTIVE | Noted: 2018-12-13

## 2018-12-14 PROBLEM — R53.81 DEBILITY: Status: ACTIVE | Noted: 2018-12-14

## 2018-12-14 LAB
ALBUMIN SERPL BCP-MCNC: 2.2 G/DL
ALP SERPL-CCNC: 445 U/L
ALT SERPL W/O P-5'-P-CCNC: 14 U/L
ANION GAP SERPL CALC-SCNC: 13 MMOL/L
AST SERPL-CCNC: 112 U/L
BASOPHILS # BLD AUTO: 0.01 K/UL
BASOPHILS NFR BLD: 0.1 %
BILIRUB SERPL-MCNC: 0.9 MG/DL
BUN SERPL-MCNC: 8 MG/DL
CALCIUM SERPL-MCNC: 9.1 MG/DL
CHLORIDE SERPL-SCNC: 99 MMOL/L
CO2 SERPL-SCNC: 24 MMOL/L
CREAT SERPL-MCNC: 1.7 MG/DL
DIFFERENTIAL METHOD: ABNORMAL
EOSINOPHIL # BLD AUTO: 0 K/UL
EOSINOPHIL NFR BLD: 0 %
ERYTHROCYTE [DISTWIDTH] IN BLOOD BY AUTOMATED COUNT: 19.2 %
EST. GFR  (AFRICAN AMERICAN): 36.5 ML/MIN/1.73 M^2
EST. GFR  (NON AFRICAN AMERICAN): 31.6 ML/MIN/1.73 M^2
GLUCOSE SERPL-MCNC: 211 MG/DL
HCT VFR BLD AUTO: 27.7 %
HGB BLD-MCNC: 8.7 G/DL
IMM GRANULOCYTES # BLD AUTO: 0.09 K/UL
IMM GRANULOCYTES NFR BLD AUTO: 0.6 %
LYMPHOCYTES # BLD AUTO: 0.7 K/UL
LYMPHOCYTES NFR BLD: 5 %
MAGNESIUM SERPL-MCNC: 2.1 MG/DL
MCH RBC QN AUTO: 25.8 PG
MCHC RBC AUTO-ENTMCNC: 31.4 G/DL
MCV RBC AUTO: 82 FL
MONOCYTES # BLD AUTO: 0.7 K/UL
MONOCYTES NFR BLD: 4.9 %
NEUTROPHILS # BLD AUTO: 12.4 K/UL
NEUTROPHILS NFR BLD: 89.4 %
NRBC BLD-RTO: 0 /100 WBC
PLATELET # BLD AUTO: 339 K/UL
PMV BLD AUTO: 10.5 FL
POCT GLUCOSE: 200 MG/DL (ref 70–110)
POCT GLUCOSE: 208 MG/DL (ref 70–110)
POTASSIUM SERPL-SCNC: 3.3 MMOL/L
PROT SERPL-MCNC: 5.7 G/DL
RBC # BLD AUTO: 3.37 M/UL
SODIUM SERPL-SCNC: 136 MMOL/L
WBC # BLD AUTO: 13.91 K/UL

## 2018-12-14 PROCEDURE — G8987 SELF CARE CURRENT STATUS: HCPCS | Mod: CK

## 2018-12-14 PROCEDURE — G8989 SELF CARE D/C STATUS: HCPCS | Mod: CK

## 2018-12-14 PROCEDURE — G8979 MOBILITY GOAL STATUS: HCPCS | Mod: CL

## 2018-12-14 PROCEDURE — 63600175 PHARM REV CODE 636 W HCPCS: Performed by: INTERNAL MEDICINE

## 2018-12-14 PROCEDURE — 94761 N-INVAS EAR/PLS OXIMETRY MLT: CPT

## 2018-12-14 PROCEDURE — G8978 MOBILITY CURRENT STATUS: HCPCS | Mod: CL

## 2018-12-14 PROCEDURE — 25000003 PHARM REV CODE 250: Performed by: INTERNAL MEDICINE

## 2018-12-14 PROCEDURE — 97165 OT EVAL LOW COMPLEX 30 MIN: CPT

## 2018-12-14 PROCEDURE — 94640 AIRWAY INHALATION TREATMENT: CPT

## 2018-12-14 PROCEDURE — G8988 SELF CARE GOAL STATUS: HCPCS | Mod: CK

## 2018-12-14 PROCEDURE — G0378 HOSPITAL OBSERVATION PER HR: HCPCS

## 2018-12-14 PROCEDURE — 80053 COMPREHEN METABOLIC PANEL: CPT

## 2018-12-14 PROCEDURE — 25000003 PHARM REV CODE 250: Performed by: PHYSICIAN ASSISTANT

## 2018-12-14 PROCEDURE — 99225 PR SUBSEQUENT OBSERVATION CARE,LEVEL II: CPT | Mod: ,,, | Performed by: INTERNAL MEDICINE

## 2018-12-14 PROCEDURE — 85025 COMPLETE CBC W/AUTO DIFF WBC: CPT

## 2018-12-14 PROCEDURE — 36415 COLL VENOUS BLD VENIPUNCTURE: CPT

## 2018-12-14 PROCEDURE — 27000221 HC OXYGEN, UP TO 24 HOURS

## 2018-12-14 PROCEDURE — G8980 MOBILITY D/C STATUS: HCPCS | Mod: CL

## 2018-12-14 PROCEDURE — 25000242 PHARM REV CODE 250 ALT 637 W/ HCPCS: Performed by: INTERNAL MEDICINE

## 2018-12-14 PROCEDURE — 97161 PT EVAL LOW COMPLEX 20 MIN: CPT

## 2018-12-14 PROCEDURE — 83735 ASSAY OF MAGNESIUM: CPT

## 2018-12-14 RX ORDER — SODIUM CHLORIDE, SODIUM LACTATE, POTASSIUM CHLORIDE, CALCIUM CHLORIDE 600; 310; 30; 20 MG/100ML; MG/100ML; MG/100ML; MG/100ML
INJECTION, SOLUTION INTRAVENOUS CONTINUOUS
Status: DISCONTINUED | OUTPATIENT
Start: 2018-12-14 | End: 2018-12-15

## 2018-12-14 RX ORDER — HYDROCODONE BITARTRATE AND ACETAMINOPHEN 5; 325 MG/1; MG/1
1 TABLET ORAL EVERY 6 HOURS PRN
Status: DISCONTINUED | OUTPATIENT
Start: 2018-12-14 | End: 2018-12-15 | Stop reason: HOSPADM

## 2018-12-14 RX ORDER — ENOXAPARIN SODIUM 100 MG/ML
30 INJECTION SUBCUTANEOUS
Status: DISCONTINUED | OUTPATIENT
Start: 2018-12-14 | End: 2018-12-15 | Stop reason: HOSPADM

## 2018-12-14 RX ORDER — POTASSIUM CHLORIDE 7.45 MG/ML
10 INJECTION INTRAVENOUS
Status: COMPLETED | OUTPATIENT
Start: 2018-12-14 | End: 2018-12-14

## 2018-12-14 RX ORDER — LOPERAMIDE HYDROCHLORIDE 2 MG/1
2 CAPSULE ORAL 4 TIMES DAILY PRN
Status: DISCONTINUED | OUTPATIENT
Start: 2018-12-14 | End: 2018-12-15 | Stop reason: HOSPADM

## 2018-12-14 RX ADMIN — POTASSIUM CHLORIDE 10 MEQ: 7.46 INJECTION, SOLUTION INTRAVENOUS at 12:12

## 2018-12-14 RX ADMIN — Medication 125 MG: at 12:12

## 2018-12-14 RX ADMIN — POTASSIUM CHLORIDE 10 MEQ: 7.46 INJECTION, SOLUTION INTRAVENOUS at 01:12

## 2018-12-14 RX ADMIN — INSULIN ASPART 1 UNITS: 100 INJECTION, SOLUTION INTRAVENOUS; SUBCUTANEOUS at 10:12

## 2018-12-14 RX ADMIN — ACETAMINOPHEN 650 MG: 325 TABLET, FILM COATED ORAL at 04:12

## 2018-12-14 RX ADMIN — IPRATROPIUM BROMIDE AND ALBUTEROL SULFATE 3 ML: .5; 3 SOLUTION RESPIRATORY (INHALATION) at 07:12

## 2018-12-14 RX ADMIN — IPRATROPIUM BROMIDE AND ALBUTEROL SULFATE 3 ML: .5; 3 SOLUTION RESPIRATORY (INHALATION) at 09:12

## 2018-12-14 RX ADMIN — ENOXAPARIN SODIUM 30 MG: 100 INJECTION SUBCUTANEOUS at 04:12

## 2018-12-14 RX ADMIN — IPRATROPIUM BROMIDE AND ALBUTEROL SULFATE 3 ML: .5; 3 SOLUTION RESPIRATORY (INHALATION) at 01:12

## 2018-12-14 RX ADMIN — ASPIRIN 81 MG: 81 TABLET, COATED ORAL at 12:12

## 2018-12-14 RX ADMIN — PREDNISONE 40 MG: 20 TABLET ORAL at 12:12

## 2018-12-14 RX ADMIN — FLUTICASONE FUROATE AND VILANTEROL TRIFENATATE 1 PUFF: 100; 25 POWDER RESPIRATORY (INHALATION) at 12:12

## 2018-12-14 RX ADMIN — DULOXETINE 60 MG: 60 CAPSULE, DELAYED RELEASE ORAL at 12:12

## 2018-12-14 RX ADMIN — Medication 125 MG: at 11:12

## 2018-12-14 RX ADMIN — SODIUM CHLORIDE, SODIUM LACTATE, POTASSIUM CHLORIDE, AND CALCIUM CHLORIDE: .6; .31; .03; .02 INJECTION, SOLUTION INTRAVENOUS at 12:12

## 2018-12-14 RX ADMIN — CLOPIDOGREL 75 MG: 75 TABLET, FILM COATED ORAL at 12:12

## 2018-12-14 RX ADMIN — CEFTRIAXONE SODIUM 1 G: 1 INJECTION, POWDER, FOR SOLUTION INTRAMUSCULAR; INTRAVENOUS at 10:12

## 2018-12-14 RX ADMIN — SIMVASTATIN 40 MG: 40 TABLET, FILM COATED ORAL at 09:12

## 2018-12-14 RX ADMIN — ONDANSETRON 8 MG: 2 INJECTION INTRAMUSCULAR; INTRAVENOUS at 07:12

## 2018-12-14 RX ADMIN — Medication 125 MG: at 04:12

## 2018-12-14 RX ADMIN — DILTIAZEM HYDROCHLORIDE 180 MG: 180 CAPSULE, COATED, EXTENDED RELEASE ORAL at 12:12

## 2018-12-14 RX ADMIN — HYDROCODONE BITARTRATE AND ACETAMINOPHEN 1 TABLET: 5; 325 TABLET ORAL at 09:12

## 2018-12-14 RX ADMIN — POTASSIUM CHLORIDE 10 MEQ: 7.46 INJECTION, SOLUTION INTRAVENOUS at 02:12

## 2018-12-14 RX ADMIN — Medication 125 MG: at 06:12

## 2018-12-14 NOTE — PLAN OF CARE
Problem: Adult Inpatient Plan of Care  Goal: Plan of Care Review  Outcome: Ongoing (interventions implemented as appropriate)  Has been continent of urine and stool overnight, asked for bedpan, had 1 episode of greenish stool small amount.   VSS, no spiking of temperature. In NSR on telemetry, insulin cover given per sliding scale. Given IV zofran as scheduled, no complained of nausea.  Kept on 2 litres of oxygen via NC, no wheezing, not in respiratory distress. Safety maintained: bed at low and locked position, call bell within reach. No fall or injury occurred on this shift. Promoted est and sleep.

## 2018-12-14 NOTE — ASSESSMENT & PLAN NOTE
- Cont with PT/OT for gait training and strengthening and restoration of ADL's   - Encourage mobility, OOB in chair, and early ambulation as appropriate  - Fall precautions   - Monitor for bowel and bladder dysfunction  - Monitor for and prevent skin breakdown and pressure ulcers    Continue DVT prophylaxis with   Anticoagulants   Medication Route Frequency    enoxaparin injection 30 mg Subcutaneous Q24H

## 2018-12-14 NOTE — PLAN OF CARE
Problem: Occupational Therapy Goal  Goal: Occupational Therapy Goal  Outcome: Outcome(s) achieved Date Met: 12/14/18  Evaluation completed.  D/C from OT services. Home, no OT needs.    NAVI Horan  12/14/2018  Rehab Services

## 2018-12-14 NOTE — ASSESSMENT & PLAN NOTE
- Improving  - UA dirty, UCx NGTD  - Continue CTX; extended treatment course based on slow response  - Continue PO vanc prophylaxis while on antibiotics

## 2018-12-14 NOTE — ASSESSMENT & PLAN NOTE
- Involuntary RUE tremors noted on examination 12/13. By her description these have been occurring for months  - EEG obtained, no evidence of epileptiform activity present on prelim read  - Will continue to monitor, no activity subsequently  - Epilepsy referral upon discharge

## 2018-12-14 NOTE — ASSESSMENT & PLAN NOTE
"  - Worsened from admission  - Elevations predominately in ALP and ALT  - US on admission showed:     "Hepatic steatosis.  Otherwise, satisfactory sonographic and Doppler evaluation of liver.    No evidence of biliary obstruction.'  "

## 2018-12-14 NOTE — SUBJECTIVE & OBJECTIVE
Interval History:     Ms. Martines felt better again today, denying shortness of breath and recurrent tremors. Her nausea and vomiting is improving. Her C. Diff was negative overnight. Labs today showed NIKHIL on CKD and abnormal LFTs, investigation underway and empiric fluids ordered.    Updated daughter at bedside    Review of Systems   Constitutional: Positive for fatigue. Negative for chills and fever.   Respiratory: Negative for cough and shortness of breath.    Cardiovascular: Negative for chest pain and leg swelling.   Gastrointestinal: Positive for diarrhea and nausea (improving). Negative for abdominal pain, constipation and vomiting.   Genitourinary: Negative for difficulty urinating.   Musculoskeletal: Negative for myalgias.   Skin: Negative for rash and wound.   Neurological: Negative for weakness and numbness.   Psychiatric/Behavioral: Negative for dysphoric mood. The patient is not nervous/anxious.      Objective:     Vital Signs (Most Recent):  Temp: 98.3 °F (36.8 °C) (12/14/18 0808)  Pulse: 95 (12/14/18 0808)  Resp: 16 (12/14/18 0808)  BP: 135/61 (12/14/18 0808)  SpO2: 97 % (12/14/18 0808) Vital Signs (24h Range):  Temp:  [96.5 °F (35.8 °C)-98.3 °F (36.8 °C)] 98.3 °F (36.8 °C)  Pulse:  [] 95  Resp:  [16-18] 16  SpO2:  [95 %-100 %] 97 %  BP: (127-162)/(61-76) 135/61     Weight: 75.9 kg (167 lb 5.3 oz)  Body mass index is 31.62 kg/m².    Intake/Output Summary (Last 24 hours) at 12/14/2018 1002  Last data filed at 12/13/2018 1500  Gross per 24 hour   Intake 120 ml   Output --   Net 120 ml      Physical Exam   Constitutional: She is oriented to person, place, and time. No distress.   Eyes: Pupils are equal, round, and reactive to light.   Cardiovascular: Normal rate and regular rhythm.   No murmur heard.  Pulmonary/Chest: Effort normal and breath sounds normal. No respiratory distress. She has no wheezes.   Abdominal: Soft. Bowel sounds are normal. She exhibits no distension. There is no tenderness.    Musculoskeletal: She exhibits no edema or tenderness.   Neurological: She is alert and oriented to person, place, and time. She displays normal reflexes.   Skin: Skin is warm and dry. No rash noted. She is not diaphoretic. No erythema.   Psychiatric: She has a normal mood and affect. Her behavior is normal.       Significant Labs:     CBC:    Recent Labs   Lab 12/12/18  1149 12/13/18  0536 12/14/18  0450   WBC 8.09 11.31 13.91*   GRAN 67.1  5.4 84.6*  9.6* 89.4*  12.4*   HGB 10.1* 9.4* 8.7*   HCT 33.4* 29.4* 27.7*    335 339       Chem 10:  Recent Labs   Lab 12/12/18  1149 12/13/18  0536 12/14/18  0450    136 136   K 3.5 3.7 3.3*   CL 96 100 99   CO2 24 21* 24   BUN 2* 3* 8   CREATININE 0.9 1.2 1.7*   * 204* 211*   CALCIUM 10.7* 9.3 9.1   MG 1.2* 1.0* 2.1   PHOS  --  2.6*  --        LFTs:  Recent Labs   Lab 12/12/18  1149 12/13/18  0536 12/14/18  0450   ALKPHOS 379* 293* 445*   BILITOT 1.2* 0.8 0.9   AST 87* 42* 112*   ALT 10 8* 14   ALBUMIN 2.4* 2.2* 2.2*       Significant Imaging:   None new

## 2018-12-14 NOTE — NURSING
Pt refused am meds at this time r/t NPO status for abd US. Transport here to take pt now. Unable to start IV potassium & IV LR for procedure. Will start when pt returns.

## 2018-12-14 NOTE — PT/OT/SLP EVAL
"Physical Therapy Evaluation and Discharge Note    Patient Name:  Sheryl Martines   MRN:  [id number]    Recommendations:     Discharge Recommendations:  (HHPT)   Discharge Equipment Recommendations: none   Barriers to discharge: None    Assessment:     Sheryl Martines is a 63 y.o. female admitted with a medical diagnosis of Acute cystitis with hematuria. .  At this time, patient is functioning at their prior level of function and does not require further acute PT services.     Recent Surgery: * No surgery found *      Plan:     During this hospitalization, patient does not require further acute PT services.  Please re-consult if situation changes.      Subjective     Chief Complaint: RLE pain  Patient/Family Comments/goals: "I can't put any weight on my R foot and if I get up, I have to put my brace on."  Pain/Comfort:  · Pain Rating 1: 7/10  · Location - Side 1: Right  · Location - Orientation 1: generalized  · Location 1: leg  · Pain Addressed 1: Reposition, Distraction  · Pain Rating Post-Intervention 1: 7/10    Patients cultural, spiritual, Congregation conflicts given the current situation: no    Living Environment:  Pt lives c daughter in H c ramp access.  PTA no hx of falls. Pt states able to manage care when home alone.  Prior to admission, patients level of function was requiring assistance c ADLs and transfers.  Equipment used at home: wheelchair, walker, rolling, rollator, oxygen, bedside commode.  DME owned (not currently used): none.  Upon discharge, patient will have assistance from family.    Objective:     Communicated with RN and OT prior to session.  Patient found HOB elevated upon PT entry to room found with: peripheral IV, oxygen(R chest port)     General Precautions: Standard,     Orthopedic Precautions:N/A   Braces: N/A     Exams:  · Cognitive Exam:  Patient is oriented to Person, Place, Time and Situation  · Gross Motor Coordination:  WFL  · Sensation:    · -       Intact  · RLE ROM: " WFL  · RLE Strength: unable to assess d/t pain and orthopedic restriction - demo at least 3/5 strength c bed mobility  · LLE ROM: WFL  · LLE Strength: grossly 4/5    Functional Mobility:  · Bed Mobility:     · Rolling Left:  minimum assistance  · Scooting: minimum assistance  · Supine to Sit: minimum assistance  · Transfers:     · Sit to Stand:  moderate assistance with hand-held assist  · Unable to achieve full stand d/t weakness  · Demo excessive hip flexion, knee flexion  · Bed to Chair: moderate assistance with  hand-held assist  using  Squat Pivot  · Balance: sitting (S); standing (mod A)    AM-PAC 6 CLICK MOBILITY  Total Score:12       Therapeutic Activities and Exercises:  Pt educated on: PT role/POC; safety c mobility; benefits of OOB activities; performing therex; d/c recs - v/u      AM-PAC 6 CLICK MOBILITY  Total Score:12     Patient left up in chair with all lines intact, call button in reach and RN notified.    GOALS:   Multidisciplinary Problems     Physical Therapy Goals     Not on file          Multidisciplinary Problems (Resolved)        Problem: Physical Therapy Goal    Goal Priority Disciplines Outcome Goal Variances Interventions   Physical Therapy Goal   (Resolved)     PT, PT/OT Outcome(s) achieved     Description:  Pt does not require additional acute PT services at this time d/t baseline c functional mobility    Pt educated on asking medical staff for PT consult if changes in functional status occurs. - v/u                          History:     Past Medical History:   Diagnosis Date    Asthma     Closed compression fracture of fourth lumbar vertebra     COPD (chronic obstructive pulmonary disease)     Coronary artery disease     Diabetes mellitus     Glaucoma     High cholesterol     Hypertension     Iritis     Pulmonary embolus     Stroke     rt sided weakness.       Past Surgical History:   Procedure Laterality Date    ABDOMINAL SURGERY      APPENDECTOMY N/A 8/26/2018     Procedure: APPENDECTOMY;  Surgeon: Bernadine Melendrez MD;  Location: [facility] OR;  Service: General;  Laterality: N/A;    APPENDECTOMY N/A 8/26/2018    Performed by Bernadine Melendrez MD at [facility] OR    APPENDECTOMY, LAPAROSCOPIC---CONVERTED TO OPEN APPENDECTOMY @0950 N/A 8/26/2018    Performed by Bernadine Melendrez MD at [facility] OR    BACK SURGERY      stimulator    CATARACT EXTRACTION      HYSTERECTOMY      LAPAROSCOPIC APPENDECTOMY N/A 8/26/2018    Procedure: APPENDECTOMY, LAPAROSCOPIC---CONVERTED TO OPEN APPENDECTOMY @0950;  Surgeon: Bernadine Melendrez MD;  Location: [facility] OR;  Service: General;  Laterality: N/A;       Clinical Decision Making:     History  Co-morbidities and personal factors that may impact the plan of care Examination  Body Structures and Functions, activity limitations and participation restrictions that may impact the plan of care Clinical Presentation   Decision Making/ Complexity Score   Co-morbidities:   [] Time since onset of injury / illness / exacerbation  [] Status of current condition  []Patient's cognitive status and safety concerns    [] Multiple Medical Problems (see med hx)  Personal Factors:   [] Patient's age  [] Prior Level of function   [] Patient's home situation (environment and family support)  [] Patient's level of motivation  [] Expected progression of patient      HISTORY:(criteria)    [x] 64880 - no personal factors/history    [] 81178 - has 1-2 personal factor/comorbidity     [] 86465 - has >3 personal factor/comorbidity     Body Regions:  [] Objective examination findings  [] Head     []  Neck  [] Trunk   [] Upper Extremity  [] Lower Extremity    Body Systems:  [] For communication ability, affect, cognition, language, and learning style: the assessment of the ability to make needs known, consciousness, orientation (person, place, and time), expected emotional /behavioral responses, and learning preferences (eg, learning barriers, education  needs)  [] For the  neuromuscular system: a general assessment of gross coordinated movement (eg, balance, gait, locomotion, transfers, and transitions) and motor function  (motor control and motor learning)  [] For the musculoskeletal system: the assessment of gross symmetry, gross range of motion, gross strength, height, and weight  [] For the integumentary system: the assessment of pliability(texture), presence of scar formation, skin color, and skin integrity  [] For cardiovascular/pulmonary system: the assessment of heart rate, respiratory rate, blood pressure, and edema     Activity limitations:    [] Patient's cognitive status and saf ety concerns          [] Status of current condition      [] Weight bearing restriction  [] Cardiopulmunary Restriction    Participation Restrictions:   [] Goals and goal agreement with the patient     [] Rehab potential (prognosis) and probable outcome      Examination of Body System: (criteria)    [x] 17627 - addressing 1-2 elements    [] 72910 - addressing a total of 3 or more elements     [] 00192 -  Addressing a total of 4 or more elements         Clinical Presentation: (criteria)  Stable - 15722     On examination of body system using standardized tests and measures patient presents with 1-2 elements from any of the following: body structures and functions, activity limitations, and/or participation restrictions.  Leading to a clinical presentation that is considered stable and/or uncomplicated                              Clinical Decision Making  (Eval Complexity):  Low- 77327     Time Tracking:     PT Received On: 12/14/18  PT Start Time: 1149     PT Stop Time: 1213  PT Total Time (min): 24 min     Billable Minutes: Evaluation 24 min      Renato Jean, PT  12/14/2018

## 2018-12-14 NOTE — ASSESSMENT & PLAN NOTE
- Stable  - Increased back to home dose of 20 units detemir nightly  - Continue holding prandial insulin  - LDSSI ordered

## 2018-12-14 NOTE — PLAN OF CARE
Problem: Physical Therapy Goal  Goal: Physical Therapy Goal  Pt does not require additional acute PT services at this time d/t baseline c functional mobility    Pt educated on asking medical staff for PT consult if changes in functional status occurs. - v/u        Outcome: Outcome(s) achieved Date Met: 12/14/18  [place] and D/C from acute PT services    Renato Jean DPT  12/14/2018

## 2018-12-14 NOTE — PROGRESS NOTES
[facility] Hospital Medicine  Progress Note    Patient Name: Sheryl Martines  MRN: [id number]  Patient Class: OP- Observation   Admission Date: 12/12/2018  Length of Stay: 0 days  Attending Physician: Kendrick Celis MD  Primary Care Provider: Primary Doctor [facility] Medicine Team: [facility] [place] Kendrick Celis MD    Subjective:     Principal Problem:Acute cystitis with hematuria    HPI:  Pt is a 62 YO lady with HTN, COPD on 2 L home O2,am sthma, HFpEF, CAD, IDDMII, Hx of CVA on Plavix, Recent nondisplaced fractire of tib/fib and appendectomy 8/2018 c/b post surgical infection and C. Diff infection and staph bactremia who presented to ED  With chief complaint of generalized weakness w constant nausea for less than a wek and , NBB emsis, and diarrhea that started last night. She decribes the diarrhea as watery and has had 5 episodes today. She marcelo any sick person exposure or new abx.  Patient started wheezing this morning with worsening dyspnea that only slightly improved post her home inhalers and has not been feeling at baseline. She denies any fever chills, mylagia, worsening cough, rhinorrhwa, any abdominal pain, or back pain, palpitation, leg swelling.    In Ed she was afebrile with normal vitals. She became tachycardic post duo nebs and steroids injections, she was given 1 L of IVF and started on ceftriaxone for a positive Ua. Lipase was negative and CT renal showed punctuate non obstructive renal stones.        Hospital Course:  12/12: Admitted to  for OBS, started on CTX for UTI and vanc for possible C. Diff  12/13: Marginal improvement. EEG ordered for abnormal RUE movement, no evidence of seizures. Awaiting C. Diff and urine culture results  12/14: Feeling better subjectively, now with NIKHIL on CKD. C. Diff negative. EEG negative.    Interval History:     Ms. Martines felt better again today, denying shortness of breath and recurrent tremors. Her nausea and vomiting is  improving. Her C. Diff was negative overnight. Labs today showed NIKHIL on CKD and abnormal LFTs, investigation underway and empiric fluids ordered.    Updated daughter at bedside    Review of Systems   Constitutional: Positive for fatigue. Negative for chills and fever.   Respiratory: Negative for cough and shortness of breath.    Cardiovascular: Negative for chest pain and leg swelling.   Gastrointestinal: Positive for diarrhea and nausea (improving). Negative for abdominal pain, constipation and vomiting.   Genitourinary: Negative for difficulty urinating.   Musculoskeletal: Negative for myalgias.   Skin: Negative for rash and wound.   Neurological: Negative for weakness and numbness.   Psychiatric/Behavioral: Negative for dysphoric mood. The patient is not nervous/anxious.      Objective:     Vital Signs (Most Recent):  Temp: 98.3 °F (36.8 °C) (12/14/18 0808)  Pulse: 95 (12/14/18 0808)  Resp: 16 (12/14/18 0808)  BP: 135/61 (12/14/18 0808)  SpO2: 97 % (12/14/18 0808) Vital Signs (24h Range):  Temp:  [96.5 °F (35.8 °C)-98.3 °F (36.8 °C)] 98.3 °F (36.8 °C)  Pulse:  [] 95  Resp:  [16-18] 16  SpO2:  [95 %-100 %] 97 %  BP: (127-162)/(61-76) 135/61     Weight: 75.9 kg (167 lb 5.3 oz)  Body mass index is 31.62 kg/m².    Intake/Output Summary (Last 24 hours) at 12/14/2018 1002  Last data filed at 12/13/2018 1500  Gross per 24 hour   Intake 120 ml   Output --   Net 120 ml      Physical Exam   Constitutional: She is oriented to person, place, and time. No distress.   Eyes: Pupils are equal, round, and reactive to light.   Cardiovascular: Normal rate and regular rhythm.   No murmur heard.  Pulmonary/Chest: Effort normal and breath sounds normal. No respiratory distress. She has no wheezes.   Abdominal: Soft. Bowel sounds are normal. She exhibits no distension. There is no tenderness.   Musculoskeletal: She exhibits no edema or tenderness.   Neurological: She is alert and oriented to person, place, and time. She displays  "normal reflexes.   Skin: Skin is warm and dry. No rash noted. She is not diaphoretic. No erythema.   Psychiatric: She has a normal mood and affect. Her behavior is normal.       Significant Labs:     CBC:    Recent Labs   Lab 12/12/18  1149 12/13/18  0536 12/14/18  0450   WBC 8.09 11.31 13.91*   GRAN 67.1  5.4 84.6*  9.6* 89.4*  12.4*   HGB 10.1* 9.4* 8.7*   HCT 33.4* 29.4* 27.7*    335 339       Chem 10:  Recent Labs   Lab 12/12/18  1149 12/13/18  0536 12/14/18  0450    136 136   K 3.5 3.7 3.3*   CL 96 100 99   CO2 24 21* 24   BUN 2* 3* 8   CREATININE 0.9 1.2 1.7*   * 204* 211*   CALCIUM 10.7* 9.3 9.1   MG 1.2* 1.0* 2.1   PHOS  --  2.6*  --        LFTs:  Recent Labs   Lab 12/12/18  1149 12/13/18  0536 12/14/18  0450   ALKPHOS 379* 293* 445*   BILITOT 1.2* 0.8 0.9   AST 87* 42* 112*   ALT 10 8* 14   ALBUMIN 2.4* 2.2* 2.2*       Significant Imaging:   None new      Assessment/Plan:      * Acute cystitis with hematuria      - Improving  - UA dirty, UCx NGTD  - Continue CTX; extended treatment course based on slow response  - Continue PO vanc prophylaxis while on antibiotics     Exacerbation of asthma      - Improving  - Continue prednisone and scheduled duonebs  - No wheezing on examination today     Debility      - Cont with PT/OT for gait training and strengthening and restoration of ADL's   - Encourage mobility, OOB in chair, and early ambulation as appropriate  - Fall precautions   - Monitor for bowel and bladder dysfunction  - Monitor for and prevent skin breakdown and pressure ulcers    Continue DVT prophylaxis with   Anticoagulants   Medication Route Frequency    enoxaparin injection 30 mg Subcutaneous Q24H          Abnormal LFTs      - Worsened from admission  - Elevations predominately in ALP and ALT  - US on admission showed:     "Hepatic steatosis.  Otherwise, satisfactory sonographic and Doppler evaluation of liver.    No evidence of biliary obstruction.'     Acute renal failure " superimposed on stage 3 chronic kidney disease      - New NIKHIL with creatinine 1.7 today, up from 1.2 yesterday (the upper range of her normal)  - Suspect volume loss and NSAID administration as possible etiologies  - Urine electrolytes, I/O to r/o obstruction, and ultrasound ordered  - Will hydrate empirically  - Follow with BMP daily, renally dose all meds     Seizure      - Involuntary RUE tremors noted on examination 12/13. By her description these have been occurring for months  - EEG obtained, no evidence of epileptiform activity present on prelim read  - Will continue to monitor, no activity subsequently  - Epilepsy referral upon discharge     Diarrhea      - Improving  - Hx of C. Diff  - C. Diff negative; d/c'd isolation precautions  - Continue vanc prophylactically     Closed fracture of right tibia and fibula      - Stable  - Managed non-operatively  - PT/OT evals ordered  - f/u with ortho as scheduled     Essential hypertension      - Stable  - Continue home dilt  - Holding losartan and lasix given NIKHIL      (HFpEF) heart failure with preserved ejection fraction      - Stable by symptoms and exam  - Holding lasix while treating NIKHIL     CAD (coronary artery disease)      - Stable  - Continue home aspirin, statin  - Holding losartan while treating NIKHIL     Insulin dependent diabetes mellitus      - Stable  - Increased back to home dose of 20 units detemir nightly  - Continue holding prandial insulin  - LDSSI ordered       VTE Risk Mitigation (From admission, onward)        Ordered     enoxaparin injection 30 mg  Every 24 hours (non-standard times)      12/14/18 0805     IP VTE HIGH RISK PATIENT  Once      12/12/18 1805     Place MARGIE hose  Until discontinued      12/12/18 1805              Kendrick Celis MD  Department of Hospital Medicine   [facility]

## 2018-12-14 NOTE — PT/OT/SLP EVAL
Occupational Therapy   Evaluation and Discharge Note    Name: Sheryl Martines  MRN: [id number]  Admitting Diagnosis:  Acute cystitis with hematuria      Recommendations:     Discharge Recommendations:    Discharge Equipment Recommendations:     Barriers to discharge:       History:     Occupational Profile:  Pt lives c daughter in [facility] c ramp access.  PTA no hx of falls. Pt states able to manage care when home alone.  Prior to admission, patients level of function was requiring assistance c ADLs and transfers.  Equipment used at home: wheelchair, walker, rolling, rollator, oxygen, bedside commode.  DME owned (not currently used): none.  Upon discharge, patient will have assistance from family.      Past Medical History:   Diagnosis Date    Asthma     Closed compression fracture of fourth lumbar vertebra     COPD (chronic obstructive pulmonary disease)     Coronary artery disease     Diabetes mellitus     Glaucoma     High cholesterol     Hypertension     Iritis     Pulmonary embolus     Stroke     rt sided weakness.       Past Surgical History:   Procedure Laterality Date    ABDOMINAL SURGERY      APPENDECTOMY N/A 8/26/2018    Procedure: APPENDECTOMY;  Surgeon: Bernadine Melendrez MD;  Location: [facility] OR;  Service: General;  Laterality: N/A;    APPENDECTOMY N/A 8/26/2018    Performed by Bernadine Melendrez MD at [facility] OR    APPENDECTOMY, LAPAROSCOPIC---CONVERTED TO OPEN APPENDECTOMY @0950 N/A 8/26/2018    Performed by Bernadine Melendrez MD at [facility] OR    BACK SURGERY      stimulator    CATARACT EXTRACTION      HYSTERECTOMY      LAPAROSCOPIC APPENDECTOMY N/A 8/26/2018    Procedure: APPENDECTOMY, LAPAROSCOPIC---CONVERTED TO OPEN APPENDECTOMY @0950;  Surgeon: Bernadine Melendrez MD;  Location: [facility] OR;  Service: General;  Laterality: N/A;       Subjective     Chief Complaint: none  Patient/Family Comments/goals: no goals set    Pain/Comfort:  · Pain Rating 1: 7/10  · Location - Side 1:  Right  · Location - Orientation 1: generalized  · Pain Addressed 1: Reposition, Distraction  · Pain Rating Post-Intervention 1: 7/10    Patients cultural, spiritual, Restorationist conflicts given the current situation:  none    Objective:     Communicated with: RN prior to session.  Patient found all lines intact and   upon OT entry to room.    General Precautions: Standard,     Orthopedic Precautions:  R LE NWB  Braces:   R hinged knee brace on with mobility    Occupational Performance:    Bed Mobility:    · Patient completed Supine to Sit with minimum assistance    Functional Mobility/Transfers:  · Patient completed Sit <> Stand Transfer with moderate assistance  with  no assistive device   · Patient completed Bed <> Chair Transfer using Squat Pivot technique with moderate assistance with no assistive device  · Functional Mobility: pt performs squat pivots with mod A. Is non-ambulatory due to R LE NWB status and generalized weakness    Activities of Daily Living:  · Feeding:  set up assist    · Lower Body Dressing: total assistance socks  · Toileting: maximal assistance mod A tx to bedside commode, max A with clothing managment and major care    Cognitive/Visual Perceptual:  Cognitive/Psychosocial Skills:     -       Follows Commands/attention:Follows two-step commands  -       Communication: clear/fluent  Visual/Perceptual:      -Intact      Physical Exam:  Balance: -       sitting balance is good    AMPAC 6 Click ADL:  AMPAC Total Score: 14    Treatment & Education:  · Pt educated on role of OT in acute care setting.   · Assisted with ADLs and functional mobility with assist levels noted above  Education:    Patient left up in chair with all lines intact and call button in reach    Assessment:     Sheryl Martines is a 63 y.o. female with a medical diagnosis of Acute cystitis with hematuria. At this time, patient is functioning at their prior level of function and does not require further acute OT services.  "    Clinical Decision Makin.  OT Low:  "Pt evaluation falls under low complexity for evaluation coding due to performance deficits noted in 1-3 areas as stated above and 0 co-morbities affecting current functional status. Data obtained from problem focused assessments. No modifications or assistance was required for completion of evaluation. Only brief occupational profile and history review completed."     Plan:     During this hospitalization, patient does not require further acute OT services.  Please re-consult if situation changes.    · Plan of Care Reviewed with:      This Plan of care has been discussed with the patient who was involved in its development and understands and is in agreement with the identified goals and treatment plan    GOALS:   Multidisciplinary Problems     Occupational Therapy Goals     Not on file          Multidisciplinary Problems (Resolved)        Problem: Occupational Therapy Goal    Goal Priority Disciplines Outcome Interventions   Occupational Therapy Goal   (Resolved)     OT, PT/OT Outcome(s) achieved                    Time Tracking:     OT Date of Treatment: 18  OT Start Time: 1149  OT Stop Time: 1213  OT Total Time (min): 24 min    Billable Minutes:Evaluation 24    NAVI Pratt  2018    "

## 2018-12-14 NOTE — ASSESSMENT & PLAN NOTE
- New NIKHIL with creatinine 1.7 today, up from 1.2 yesterday (the upper range of her normal)  - Suspect volume loss and NSAID administration as possible etiologies  - Urine electrolytes, I/O to r/o obstruction, and ultrasound ordered  - Will hydrate empirically  - Follow with BMP daily, renally dose all meds

## 2018-12-14 NOTE — ASSESSMENT & PLAN NOTE
- Improving  - Hx of C. Diff  - C. Diff negative; d/c'd isolation precautions  - Continue vanc prophylactically

## 2018-12-15 VITALS
HEIGHT: 61 IN | WEIGHT: 167.31 LBS | SYSTOLIC BLOOD PRESSURE: 114 MMHG | TEMPERATURE: 98 F | RESPIRATION RATE: 16 BRPM | DIASTOLIC BLOOD PRESSURE: 69 MMHG | BODY MASS INDEX: 31.59 KG/M2 | HEART RATE: 89 BPM | OXYGEN SATURATION: 95 %

## 2018-12-15 LAB
ALBUMIN SERPL BCP-MCNC: 2.1 G/DL
ALP SERPL-CCNC: 410 U/L
ALT SERPL W/O P-5'-P-CCNC: 14 U/L
ANION GAP SERPL CALC-SCNC: 6 MMOL/L
AST SERPL-CCNC: 107 U/L
BACTERIA UR CULT: NORMAL
BACTERIA UR CULT: NORMAL
BASOPHILS # BLD AUTO: 0.01 K/UL
BASOPHILS NFR BLD: 0.1 %
BILIRUB SERPL-MCNC: 0.8 MG/DL
BUN SERPL-MCNC: 8 MG/DL
CALCIUM SERPL-MCNC: 8.6 MG/DL
CHLORIDE SERPL-SCNC: 98 MMOL/L
CO2 SERPL-SCNC: 31 MMOL/L
CREAT SERPL-MCNC: 1.1 MG/DL
CREAT UR-MCNC: 23 MG/DL
DIFFERENTIAL METHOD: ABNORMAL
EOSINOPHIL # BLD AUTO: 0 K/UL
EOSINOPHIL NFR BLD: 0.1 %
ERYTHROCYTE [DISTWIDTH] IN BLOOD BY AUTOMATED COUNT: 18.8 %
EST. GFR  (AFRICAN AMERICAN): >60 ML/MIN/1.73 M^2
EST. GFR  (NON AFRICAN AMERICAN): 53.6 ML/MIN/1.73 M^2
GLUCOSE SERPL-MCNC: 163 MG/DL
HCT VFR BLD AUTO: 25 %
HGB BLD-MCNC: 8 G/DL
IMM GRANULOCYTES # BLD AUTO: 0.06 K/UL
IMM GRANULOCYTES NFR BLD AUTO: 0.6 %
LYMPHOCYTES # BLD AUTO: 0.7 K/UL
LYMPHOCYTES NFR BLD: 7.3 %
MAGNESIUM SERPL-MCNC: 1.6 MG/DL
MCH RBC QN AUTO: 26.7 PG
MCHC RBC AUTO-ENTMCNC: 32 G/DL
MCV RBC AUTO: 83 FL
MONOCYTES # BLD AUTO: 0.4 K/UL
MONOCYTES NFR BLD: 3.8 %
NEUTROPHILS # BLD AUTO: 8.9 K/UL
NEUTROPHILS NFR BLD: 88.1 %
NRBC BLD-RTO: 0 /100 WBC
PLATELET # BLD AUTO: 253 K/UL
PMV BLD AUTO: 10 FL
POCT GLUCOSE: 109 MG/DL (ref 70–110)
POTASSIUM SERPL-SCNC: 3.6 MMOL/L
PROT SERPL-MCNC: 5.1 G/DL
RBC # BLD AUTO: 3 M/UL
SODIUM SERPL-SCNC: 135 MMOL/L
UUN UR-MCNC: 105 MG/DL
WBC # BLD AUTO: 10.15 K/UL

## 2018-12-15 PROCEDURE — 80053 COMPREHEN METABOLIC PANEL: CPT

## 2018-12-15 PROCEDURE — 84540 ASSAY OF URINE/UREA-N: CPT

## 2018-12-15 PROCEDURE — 36415 COLL VENOUS BLD VENIPUNCTURE: CPT

## 2018-12-15 PROCEDURE — 25000003 PHARM REV CODE 250: Performed by: INTERNAL MEDICINE

## 2018-12-15 PROCEDURE — G0378 HOSPITAL OBSERVATION PER HR: HCPCS

## 2018-12-15 PROCEDURE — 25000242 PHARM REV CODE 250 ALT 637 W/ HCPCS: Performed by: INTERNAL MEDICINE

## 2018-12-15 PROCEDURE — 85025 COMPLETE CBC W/AUTO DIFF WBC: CPT

## 2018-12-15 PROCEDURE — 27000221 HC OXYGEN, UP TO 24 HOURS

## 2018-12-15 PROCEDURE — 94640 AIRWAY INHALATION TREATMENT: CPT

## 2018-12-15 PROCEDURE — 99217 PR OBSERVATION CARE DISCHARGE: CPT | Mod: ,,, | Performed by: INTERNAL MEDICINE

## 2018-12-15 PROCEDURE — 63600175 PHARM REV CODE 636 W HCPCS: Performed by: INTERNAL MEDICINE

## 2018-12-15 PROCEDURE — 99900035 HC TECH TIME PER 15 MIN (STAT)

## 2018-12-15 PROCEDURE — 82570 ASSAY OF URINE CREATININE: CPT

## 2018-12-15 PROCEDURE — 83735 ASSAY OF MAGNESIUM: CPT

## 2018-12-15 RX ORDER — PREDNISONE 10 MG/1
TABLET ORAL
Qty: 30 TABLET | Refills: 0 | Status: SHIPPED | OUTPATIENT
Start: 2018-12-15

## 2018-12-15 RX ORDER — CIPROFLOXACIN 250 MG/1
250 TABLET, FILM COATED ORAL EVERY 12 HOURS
Status: DISCONTINUED | OUTPATIENT
Start: 2018-12-15 | End: 2018-12-15 | Stop reason: HOSPADM

## 2018-12-15 RX ORDER — POTASSIUM CHLORIDE 20 MEQ/1
20 TABLET, EXTENDED RELEASE ORAL ONCE
Status: COMPLETED | OUTPATIENT
Start: 2018-12-15 | End: 2018-12-15

## 2018-12-15 RX ORDER — METRONIDAZOLE 500 MG/1
500 TABLET ORAL EVERY 8 HOURS
Qty: 12 TABLET | Refills: 0 | Status: SHIPPED | OUTPATIENT
Start: 2018-12-15 | End: 2018-12-19

## 2018-12-15 RX ORDER — LOPERAMIDE HYDROCHLORIDE 2 MG/1
2 CAPSULE ORAL 4 TIMES DAILY PRN
Qty: 20 CAPSULE | Refills: 0 | Status: SHIPPED | OUTPATIENT
Start: 2018-12-15 | End: 2018-12-25

## 2018-12-15 RX ORDER — LANOLIN ALCOHOL/MO/W.PET/CERES
400 CREAM (GRAM) TOPICAL DAILY
Status: DISCONTINUED | OUTPATIENT
Start: 2018-12-15 | End: 2018-12-15 | Stop reason: HOSPADM

## 2018-12-15 RX ORDER — CIPROFLOXACIN 250 MG/1
250 TABLET, FILM COATED ORAL EVERY 12 HOURS
Qty: 5 TABLET | Refills: 0 | Status: ON HOLD | OUTPATIENT
Start: 2018-12-15 | End: 2019-03-13 | Stop reason: HOSPADM

## 2018-12-15 RX ADMIN — SODIUM CHLORIDE, SODIUM LACTATE, POTASSIUM CHLORIDE, AND CALCIUM CHLORIDE: .6; .31; .03; .02 INJECTION, SOLUTION INTRAVENOUS at 02:12

## 2018-12-15 RX ADMIN — Medication 125 MG: at 05:12

## 2018-12-15 RX ADMIN — ASPIRIN 81 MG: 81 TABLET, COATED ORAL at 08:12

## 2018-12-15 RX ADMIN — DULOXETINE 60 MG: 60 CAPSULE, DELAYED RELEASE ORAL at 08:12

## 2018-12-15 RX ADMIN — DILTIAZEM HYDROCHLORIDE 180 MG: 180 CAPSULE, COATED, EXTENDED RELEASE ORAL at 08:12

## 2018-12-15 RX ADMIN — Medication 125 MG: at 11:12

## 2018-12-15 RX ADMIN — POTASSIUM CHLORIDE 20 MEQ: 1500 TABLET, EXTENDED RELEASE ORAL at 08:12

## 2018-12-15 RX ADMIN — FLUTICASONE FUROATE AND VILANTEROL TRIFENATATE 1 PUFF: 100; 25 POWDER RESPIRATORY (INHALATION) at 08:12

## 2018-12-15 RX ADMIN — IPRATROPIUM BROMIDE AND ALBUTEROL SULFATE 3 ML: .5; 3 SOLUTION RESPIRATORY (INHALATION) at 09:12

## 2018-12-15 RX ADMIN — MAGNESIUM OXIDE TAB 400 MG (241.3 MG ELEMENTAL MG) 400 MG: 400 (241.3 MG) TAB at 09:12

## 2018-12-15 RX ADMIN — CLOPIDOGREL 75 MG: 75 TABLET, FILM COATED ORAL at 08:12

## 2018-12-15 RX ADMIN — PREDNISONE 40 MG: 20 TABLET ORAL at 08:12

## 2018-12-15 RX ADMIN — CIPROFLOXACIN HYDROCHLORIDE 250 MG: 250 TABLET, FILM COATED ORAL at 08:12

## 2018-12-15 NOTE — ASSESSMENT & PLAN NOTE
- Improving  - Continue prednisone and scheduled duonebs  - No wheezing on examination today    Discharged with plans for one more additional day of prednisone 40, then decrease back to her home 10 mg daily

## 2018-12-15 NOTE — ASSESSMENT & PLAN NOTE
- Improving  - UA dirty, UCx growing E. Faecalis and pseudomonas  - Finished CTX; will continue cipro for the pseudomonas for a total of three days  - Symptoms resolved

## 2018-12-15 NOTE — PLAN OF CARE
Problem: Adult Inpatient Plan of Care  Goal: Plan of Care Review  Outcome: Ongoing (interventions implemented as appropriate)  Will continue to assess pt's safety by making sure personal belongings are within reach to pt, make sure bed in lowest position, bed is locked and side rails are up., s/s of infection and give antibiotics, assess to see if pt is having diarrhea and give anti diarrheal medications, assess for urine incontinence and assist pt with using bedpan. Will continue to monitor for skin breakdown and educate pt on the importance of repositioning to prevent skin breakdown.

## 2018-12-15 NOTE — ASSESSMENT & PLAN NOTE
- Improving  - Hx of C. Diff  - C. Diff negative; d/c'd isolation precautions    Continue vanc prophylactically

## 2018-12-15 NOTE — NURSING
Reviewed discharge instructions/medications with patient & daughter Kandice. Prescriptions sent to pt's pharmacy. IV removed. All belongings returned. Pt escorted via w/c to personal vehicle.

## 2018-12-15 NOTE — PLAN OF CARE
[facility]    HOME HEALTH ORDERS  FACE TO FACE ENCOUNTER    Patient Name: Sheryl Martines  YOB: 1955    PCP: Primary Doctor No   PCP Address: None  PCP Phone Number: None  PCP Fax: None    Encounter Date: 12/15/2018    Admit to Home Health    Diagnoses:  Active Hospital Problems    Diagnosis  POA    *Acute cystitis with hematuria [N30.01]  Yes     Priority: 1 - High    Exacerbation of asthma [J45.901]  Yes     Priority: 2     Abnormal LFTs [R94.5]  Yes    Debility [R53.81]  Yes    Acute renal failure superimposed on stage 3 chronic kidney disease [N17.9, N18.3]  Yes    Seizure [R56.9]  Yes    Diarrhea [R19.7]  Yes    Closed fracture of right tibia and fibula [S82.201A, S82.401A]  Yes    Essential hypertension [I10]  Yes    (HFpEF) heart failure with preserved ejection fraction [I50.30]  Yes    Insulin dependent diabetes mellitus [E11.9, Z79.4]  Not Applicable    CAD (coronary artery disease) [I25.10]  Yes      Resolved Hospital Problems   No resolved problems to display.       Future Appointments   Date Time Provider Department Center   1/10/2019  8:15 AM [facility]           I have seen and examined this patient face to face today. My clinical findings that support the need for the home health skilled services and home bound status are the following:  Weakness/numbness causing balance and gait disturbance due to Fracture and Weakness/Debility making it taxing to leave home.    Allergies:  Review of patient's allergies indicates:   Allergen Reactions    Ace inhibitors Swelling    Hydralazine analogues     Tetracyclines Swelling    Travatan (with benzalkonium) [travoprost (benzalkonium)]        Diet: cardiac diet and diabetic diet: 2000 calorie    Activities: activity as tolerated    Nursing:   SN to complete comprehensive assessment including routine vital signs. Instruct on disease process and s/s of complications to report to MD.  Review/verify medication list sent home with the patient at time of discharge  and instruct patient/caregiver as needed. Frequency may be adjusted depending on start of care date.    Notify MD if SBP > 160 or < 90; DBP > 90 or < 50; HR > 120 or < 50; Temp > 101;     CONSULTS:    Physical Therapy to evaluate and treat. Evaluate for home safety and equipment needs; Establish/upgrade home exercise program. Perform / instruct on therapeutic exercises, gait training, transfer training, and Range of Motion.  Aide to provide assistance with personal care, ADLs, and vital signs.    MISCELLANEOUS CARE:  Home Oxygen:  Oxygen at 2 L/min nasal canula to be used:  Continuously.    WOUND CARE ORDERS  n/a      Medications: Review discharge medications with patient and family and provide education.      Current Discharge Medication List      START taking these medications    Details   ciprofloxacin HCl (CIPRO) 250 MG tablet Take 1 tablet (250 mg total) by mouth every 12 (twelve) hours. Starting 12/15 evening  Qty: 5 tablet, Refills: 0      loperamide (IMODIUM) 2 mg capsule Take 1 capsule (2 mg total) by mouth 4 (four) times daily as needed for Diarrhea.  Qty: 20 capsule, Refills: 0      metroNIDAZOLE (FLAGYL) 500 MG tablet Take 1 tablet (500 mg total) by mouth every 8 (eight) hours. for 4 days  Qty: 12 tablet, Refills: 0         CONTINUE these medications which have CHANGED    Details   insulin detemir U-100 (LEVEMIR FLEXTOUCH) 100 unit/mL (3 mL) SubQ InPn pen Inject 20 Units into the skin 2 (two) times daily.  Qty: 12 mL, Refills: 0      predniSONE (DELTASONE) 10 MG tablet Take 4 tablets (40 mg) on 12/16, then take 1 tablet (10 mg) daily  Qty: 30 tablet, Refills: 0         CONTINUE these medications which have NOT CHANGED    Details   acetaminophen (TYLENOL) 500 MG tablet Take 1,000 mg by mouth 2 (two) times daily as needed for Pain.      albuterol (PROVENTIL/VENTOLIN HFA) 90 mcg/actuation inhaler Inhale 2 puffs into the lungs  every 6 (six) hours as needed for Wheezing or Shortness of Breath. Rescue      albuterol-ipratropium (DUO-NEB) 2.5 mg-0.5 mg/3 mL nebulizer solution Take 3 mLs by nebulization every 4 (four) hours as needed for Wheezing or Shortness of Breath. Rescue       aspirin (ECOTRIN) 81 MG EC tablet Take 1 tablet (81 mg total) by mouth once daily.  Qty: 30 tablet, Refills: 11      baclofen (LIORESAL) 10 MG tablet Take 10 mg by mouth 2 (two) times daily as needed (MUSCLE SPASMS).      clopidogrel (PLAVIX) 75 mg tablet Take 75 mg by mouth once daily.      diclofenac sodium (VOLTAREN) 1 % Gel Apply 2 g topically daily as needed (PAIN).      diltiaZEM (CARDIZEM CD) 180 MG 24 hr capsule Take 180 mg by mouth once daily.      DULoxetine (CYMBALTA) 60 MG capsule Take 60 mg by mouth once daily.      fluticasone-vilanterol (BREO ELLIPTA) 100-25 mcg/dose diskus inhaler Inhale 1 puff into the lungs once daily. Controller      furosemide (LASIX) 40 MG tablet Take 40 mg by mouth once daily.       gabapentin (NEURONTIN) 300 MG capsule Take 300 mg by mouth once daily.      HYDROcodone-acetaminophen (NORCO) 5-325 mg per tablet Take 1 tablet by mouth every 4 to 6 hours as needed.  Qty: 42 tablet, Refills: 0      lansoprazole (PREVACID) 30 MG capsule Take 30 mg by mouth once daily.      losartan (COZAAR) 100 MG tablet Take 100 mg by mouth once daily.       ondansetron (ZOFRAN) 4 MG tablet Take 1 tablet (4 mg total) by mouth every 6 (six) hours as needed.  Qty: 30 tablet, Refills: 1      pyridoxine, vitamin B6, (VITAMIN B-6) 50 MG Tab Take 1 tablet (50 mg total) by mouth once daily.  Refills: 0      theophylline (THEODUR) 300 mg 24 hr capsule Take 300 mg by mouth once daily.         STOP taking these medications       albuterol (PROVENTIL) 2.5 mg /3 mL (0.083 %) nebulizer solution Comments:   Reason for Stopping:         insulin lispro (HUMALOG) 100 unit/mL injection Comments:   Reason for Stopping:         senna-docusate 8.6-50 mg (SENNA WITH  DOCUSATE SODIUM) 8.6-50 mg per tablet Comments:   Reason for Stopping:         simvastatin (ZOCOR) 40 MG tablet Comments:   Reason for Stopping:               I certify that this patient is confined to her home and needs intermittent skilled nursing care and physical therapy.

## 2018-12-15 NOTE — ASSESSMENT & PLAN NOTE
- Improving  - Hx of C. Diff  - C. Diff negative; d/c'd isolation precautions    Continue flagyl prophylactically (PO vanc not covered by patient's insurance)

## 2018-12-15 NOTE — ASSESSMENT & PLAN NOTE
- Involuntary RUE tremors noted on examination 12/13. By her description these have been occurring for months  - EEG obtained, no evidence of epileptiform activity present on prelim read  - Will continue to monitor, no activity subsequently    F/u with neuro ordered

## 2018-12-15 NOTE — ASSESSMENT & PLAN NOTE
- Stable  - Continue home aspirin, losartan    Holding home statin while awaiting hepatology f/u due to abnormal LFTs

## 2018-12-15 NOTE — NURSING
Called pt's daughter Kandice to notify her of pt's discharge order. Kandice stated that she would come  her mom around 1pm. Will make sure everything is ready for Sheryl by that time.

## 2018-12-15 NOTE — PLAN OF CARE
Problem: Adult Inpatient Plan of Care  Goal: Readiness for Transition of Care  Outcome: Ongoing (interventions implemented as appropriate)  Intervention: Mutually Develop Transition Plan  Pt is resting comfortably in bed at this time. Pt has been very fatigued today; sleeping in between cares. Ate very little food today: 25% breakfast, 0 lunch, 25-50% dinner. Pt states she doesn't have much of an appetite. Intermittent wheezing noted to BUL today; scheduled neb tx. Pt remains on home dose of 2L O2 with sats >95%. Pt r/t pain in RLE this afternoon; administered tylenol x1. MD placed order to restart home dose of hydrocodone this evening as well. Pt rolls well in bed for bedpan placement. PT/OT evals done this am. Pt sat up in chair for about 30 minutes with 1-2 max assist. IV KCL given for potassium of 3.3 today. Oral abx given. LR started today to flush kidnies r/t CR of 1.7. Plan to continue abx & re-assess in am.

## 2018-12-15 NOTE — PLAN OF CARE
Patient ready for discharge home with home health; patient preference is for [facility]. HH orders sent to [facility] via [facility]. [facility] on call notified of pending discharge. [facility] appt scheduled for 12/18 at 4pm per Dr Celis's request.

## 2018-12-15 NOTE — DISCHARGE SUMMARY
[facility] Hospital Medicine  Discharge Summary      Patient Name: Sheryl Martines  MRN: [id number]  Admission Date: 12/12/2018  Hospital Length of Stay: 0 days  Discharge Date and Time:  12/15/2018 10:36 AM  Attending Physician: Kendrick Celis MD   Discharging Provider: Kendrick Celis MD  Primary Care Provider: Primary Doctor [facility] Medicine Team: [facility] HOSP MED O Kendrick Celis MD    HPI:   Pt is a 62 YO lady with HTN, COPD on 2 L home O2,am sthma, HFpEF, CAD, IDDMII, Hx of CVA on Plavix, Recent nondisplaced fractire of tib/fib and appendectomy 8/2018 c/b post surgical infection and C. Diff infection and staph bactremia who presented to ED  With chief complaint of generalized weakness w constant nausea for less than a wek and , NBB emsis, and diarrhea that started last night. She decribes the diarrhea as watery and has had 5 episodes today. She marcelo any sick person exposure or new abx.  Patient started wheezing this morning with worsening dyspnea that only slightly improved post her home inhalers and has not been feeling at baseline. She denies any fever chills, mylagia, worsening cough, rhinorrhwa, any abdominal pain, or back pain, palpitation, leg swelling.    In Ed she was afebrile with normal vitals. She became tachycardic post duo nebs and steroids injections, she was given 1 L of IVF and started on ceftriaxone for a positive Ua. Lipase was negative and CT renal showed punctuate non obstructive renal stones.        * No surgery found *      Hospital Course:   12/12: Admitted to  for OBS, started on CTX for UTI and vanc for possible C. Diff  12/13: Marginal improvement. EEG ordered for abnormal RUE movement, no evidence of seizures. Awaiting C. Diff and urine culture results  12/14: Feeling better subjectively, now with NIKHIL on CKD. C. Diff negative. EEG negative.  12/15: Back to baseline subjectively, NIKHIL resolved, UCx growing both E. Faecalis and pseudomonas, antibiotics  changed to cipro and discharged    In brief, Ms. Martines is a 63-year-old woman who was observed in the hospital for UTI and an asthma exacerbation, thought to be precipitated by viral upper respiratory tract infection. Her respiratory symptoms resolved quickly with prednisone, but she had persistent fatigue and malaise. On admission her UA was dirty, prompting empiric treatment with CTX. Her culture grew both faecalis and pseudomonas; she had already received adequate treatment for the enterococcus, so she was discharged with plans to complete a total of three days of cipro targeted toward the pseudomonas isolate. She has a history of C. Diff (tested negative this admission), for which she is being prescribed flagyl (PO vanc not covered by patient's insurance) to be taken with the antibiotics and two days following as a prophylactic measure. Other details of her admission include transient NIKHIL that resolved with fluids, an episode of involuntary RUE movement that resolved spontaneously for which an EEG was obtained that was negative, and abnormal LFTs with hepatic steatosis on US. She will need to follow-up neuro for the movement and hepatology for the abnormal labs.     Her PCP is in [place], and it has been quite some time since her last visit, with numerous hospitalizations in the interim; will arrange priority care follow-up given this and her overall medical complexity.     Consults:   Consults (From admission, onward)        Status Ordering Provider     IP consult to case management  Once     Provider:  (Not yet assigned)    Acknowledged [facility] [place]          Diarrhea      - Improving  - Hx of C. Diff  - C. Diff negative; d/c'd isolation precautions    Continue flagyl prophylactically (PO vanc not covered by patient's insurance)       Final Active Diagnoses:    Diagnosis Date Noted POA    PRINCIPAL PROBLEM:  Acute cystitis with hematuria [N30.01] 12/12/2018 Yes    Exacerbation of asthma [J45.901]  12/12/2018 Yes    Abnormal LFTs [R94.5] 12/14/2018 Yes    Debility [R53.81] 12/14/2018 Yes    Acute renal failure superimposed on stage 3 chronic kidney disease [N17.9, N18.3] 12/13/2018 Yes    Seizure [R56.9]  Yes    Diarrhea [R19.7] 12/12/2018 Yes    Closed fracture of right tibia and fibula [S82.201A, S82.401A] 10/03/2018 Yes    Essential hypertension [I10] 09/21/2018 Yes    (HFpEF) heart failure with preserved ejection fraction [I50.30] 09/11/2018 Yes    Insulin dependent diabetes mellitus [E11.9, Z79.4] 08/23/2018 Not Applicable    CAD (coronary artery disease) [I25.10] 08/23/2018 Yes      Problems Resolved During this Admission:       Discharged Condition: fair    Disposition: Home-Health Care [facility]    Follow Up:  Follow-up Information     [facility] On 12/18/2018.    Why:  4:00pm  Contact information:  [address]  [facility] [address]  [phone]                 Patient Instructions:      Ambulatory Referral to Hepatology   Referral Priority: Routine Referral Type: Consultation   Referral Reason: Specialty Services Required   Number of Visits Requested: 1     Ambulatory Referral to Neurology   Referral Priority: Routine Referral Type: Consultation   Referral Reason: Specialty Services Required   Requested Specialty: Neurology   Number of Visits Requested: 1     Call MD for:  temperature >100.4     Call MD for:  persistent nausea and vomiting or diarrhea     Call MD for:  severe uncontrolled pain     Call MD for:  redness, tenderness, or signs of infection (pain, swelling, redness, odor or green/yellow discharge around incision site)     Call MD for:  difficulty breathing or increased cough     Call MD for:  severe persistent headache     Call MD for:  worsening rash     Call MD for:  persistent dizziness, light-headedness, or visual disturbances     Call MD for:  increased confusion or weakness       Significant Diagnostic Studies:     CBC:  Recent Labs   Lab 12/14/18  2748  12/15/18  0407   WBC 13.91* 10.15   GRAN 89.4*  12.4* 88.1*  8.9*   HGB 8.7* 8.0*   HCT 27.7* 25.0*    253       Chem 10:  Recent Labs   Lab 12/14/18  0450 12/15/18  0407    135*   K 3.3* 3.6   CL 99 98   CO2 24 31*   BUN 8 8   CREATININE 1.7* 1.1   * 163*   CALCIUM 9.1 8.6*   MG 2.1 1.6       LFTs:  Recent Labs   Lab 12/14/18  0450 12/15/18  0407   ALKPHOS 445* 410*   BILITOT 0.9 0.8   * 107*   ALT 14 14   ALBUMIN 2.2* 2.1*       Pending Diagnostic Studies:     None         Medications:  Reconciled Home Medications:      Medication List      START taking these medications    ciprofloxacin HCl 250 MG tablet  Commonly known as:  CIPRO  Take 1 tablet (250 mg total) by mouth every 12 (twelve) hours. Starting 12/15 evening     loperamide 2 mg capsule  Commonly known as:  IMODIUM  Take 1 capsule (2 mg total) by mouth 4 (four) times daily as needed for Diarrhea.     metroNIDAZOLE 500 MG tablet  Commonly known as:  FLAGYL  Take 1 tablet (500 mg total) by mouth every 8 (eight) hours. for 4 days        CHANGE how you take these medications    albuterol 90 mcg/actuation inhaler  Commonly known as:  PROVENTIL/VENTOLIN HFA  Inhale 2 puffs into the lungs every 6 (six) hours as needed for Wheezing or Shortness of Breath. Rescue  What changed:  Another medication with the same name was removed. Continue taking this medication, and follow the directions you see here.     predniSONE 10 MG tablet  Commonly known as:  DELTASONE  Take 4 tablets (40 mg) on 12/16, then take 1 tablet (10 mg) daily  What changed:    · how much to take  · how to take this  · when to take this  · additional instructions        CONTINUE taking these medications    acetaminophen 500 MG tablet  Commonly known as:  TYLENOL  Take 1,000 mg by mouth 2 (two) times daily as needed for Pain.     albuterol-ipratropium 2.5 mg-0.5 mg/3 mL nebulizer solution  Commonly known as:  DUO-NEB  Take 3 mLs by nebulization every 4 (four) hours as  needed for Wheezing or Shortness of Breath. Rescue     aspirin 81 MG EC tablet  Commonly known as:  ECOTRIN  Take 1 tablet (81 mg total) by mouth once daily.     baclofen 10 MG tablet  Commonly known as:  LIORESAL  Take 10 mg by mouth 2 (two) times daily as needed (MUSCLE SPASMS).     BREO ELLIPTA 100-25 mcg/dose diskus inhaler  Generic drug:  fluticasone-vilanterol  Inhale 1 puff into the lungs once daily. Controller     clopidogrel 75 mg tablet  Commonly known as:  PLAVIX  Take 75 mg by mouth once daily.     COZAAR 100 MG tablet  Generic drug:  losartan  Take 100 mg by mouth once daily.     diltiaZEM 180 MG 24 hr capsule  Commonly known as:  CARDIZEM CD  Take 180 mg by mouth once daily.     DULoxetine 60 MG capsule  Commonly known as:  CYMBALTA  Take 60 mg by mouth once daily.     furosemide 40 MG tablet  Commonly known as:  LASIX  Take 40 mg by mouth once daily.     gabapentin 300 MG capsule  Commonly known as:  NEURONTIN  Take 300 mg by mouth once daily.     HYDROcodone-acetaminophen 5-325 mg per tablet  Commonly known as:  NORCO  Take 1 tablet by mouth every 4 to 6 hours as needed.     insulin detemir U-100 100 unit/mL (3 mL) Inpn pen  Commonly known as:  LEVEMIR FLEXTOUCH  Inject 20 Units into the skin 2 (two) times daily.     lansoprazole 30 MG capsule  Commonly known as:  PREVACID  Take 30 mg by mouth once daily.     ondansetron 4 MG tablet  Commonly known as:  ZOFRAN  Take 1 tablet (4 mg total) by mouth every 6 (six) hours as needed.     pyridoxine (vitamin B6) 50 MG Tab  Commonly known as:  VITAMIN B-6  Take 1 tablet (50 mg total) by mouth once daily.     theophylline 300 mg 24 hr capsule  Commonly known as:  THEODUR  Take 300 mg by mouth once daily.     VOLTAREN 1 % Gel  Generic drug:  diclofenac sodium  Apply 2 g topically daily as needed (PAIN).        STOP taking these medications    insulin lispro 100 unit/mL injection     SENNA WITH DOCUSATE SODIUM 8.6-50 mg per tablet  Generic drug:  senna-docusate  8.6-50 mg     simvastatin 40 MG tablet  Commonly known as:  ZOCOR            Indwelling Lines/Drains at time of discharge:   Lines/Drains/Airways     Central Venous Catheter Line                 Port A Cath Single Lumen 10/04/18 0302 right subclavian 72 days                Time spent on the discharge of patient: > 30 minutes    Patient was seen and examined on the date of discharge and determined to be suitable for discharge.      Kendrick Celis MD  Department of Hospital Medicine  [facility]

## 2018-12-15 NOTE — ASSESSMENT & PLAN NOTE
"  - Stable/mildly improved from admission  - Elevations predominately in ALP and ALT  - US on admission showed:     "Hepatic steatosis.  Otherwise, satisfactory sonographic and Doppler evaluation of liver.    No evidence of biliary obstruction.'    Holding home statin, referred to hepatology upon discharge  "

## 2018-12-15 NOTE — PLAN OF CARE
Problem: Adult Inpatient Plan of Care  Goal: Plan of Care Review  Outcome: Ongoing (interventions implemented as appropriate)  Patient will be continuously monitored, per protocol.    12/15/18 2102   Plan of Care Review   Plan of Care Reviewed With patient

## 2018-12-15 NOTE — ASSESSMENT & PLAN NOTE
- Stable  - Increased back to home dose of 20 units detemir BID  - LDSSI ordered    Discharged on 20 units detemir BID, a slight reduction from her home dose but an effective regimen while inpatient

## 2018-12-15 NOTE — PLAN OF CARE
Problem: Adult Inpatient Plan of Care  Goal: Plan of Care Review  Outcome: Ongoing (interventions implemented as appropriate)  Fall risk - Patient will be assisted WITH assistive device for ambulation.    Infection (Adult) - Aseptic technique observed; port swab; CHG when cleaning.    Diarrhea (Adult) - Educate on s/s of various medications and abx; allow for bedpan.    Urinary Incontinence (Adult) - Must keep patient dry and allow for bedpan.    Cardiac Rhythm Management (Adult) - If necessary.    Skin Injury Risk Increased (Adult/Obstetrics) - Assess for skin breaks and moisture risks.

## 2018-12-17 ENCOUNTER — DOCUMENTATION ONLY (OUTPATIENT)
Dept: TRANSPLANT | Facility: CLINIC | Age: 63
End: 2018-12-17

## 2018-12-17 PROBLEM — S32.040A CLOSED COMPRESSION FRACTURE OF FOURTH LUMBAR VERTEBRA: Status: RESOLVED | Noted: 2018-08-23 | Resolved: 2018-12-17

## 2018-12-17 PROBLEM — E86.0 DEHYDRATION: Status: RESOLVED | Noted: 2018-11-01 | Resolved: 2018-12-17

## 2018-12-17 PROBLEM — R50.9 FEVER: Status: RESOLVED | Noted: 2018-09-20 | Resolved: 2018-12-17

## 2018-12-17 PROBLEM — T81.49XA INFECTED INCISION: Status: RESOLVED | Noted: 2018-09-20 | Resolved: 2018-12-17

## 2018-12-17 PROBLEM — R19.7 DIARRHEA: Status: RESOLVED | Noted: 2018-12-12 | Resolved: 2018-12-17

## 2018-12-17 PROBLEM — R62.7 ADULT FAILURE TO THRIVE: Status: RESOLVED | Noted: 2018-09-18 | Resolved: 2018-12-17

## 2018-12-17 PROBLEM — Z75.8 DISCHARGE PLANNING ISSUES: Status: RESOLVED | Noted: 2018-11-01 | Resolved: 2018-12-17

## 2018-12-17 PROBLEM — Z90.49 S/P APPENDECTOMY: Status: RESOLVED | Noted: 2018-09-11 | Resolved: 2018-12-17
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
## 2018-12-17 PROBLEM — R41.82 ALTERED MENTAL STATUS: Status: RESOLVED | Noted: 2018-09-23 | Resolved: 2018-12-17

## 2018-12-17 LAB — BACTERIA BLD CULT: NORMAL

## 2018-12-17 NOTE — LETTER
December 17, 2018    Kendrick Celis MD  [address] [facility] [address]      Dear Dr. Celis    Patient: Sheryl Martines   MR Number: [id number]   YOB: 1955     Thank you for the referral of Sheryl Martines to the [facility] program. An initial appointment will be scheduled for your patient with one of our Hepatologists.      Thank you again for your trust in our program.  If there is anything we can do for you or your staff, please feel free to contact us.        Sincerely,        [facility]  [facility] [place] [address]  [phone]

## 2018-12-17 NOTE — LETTER
December 17, 2018    Sheryl Martines  [address] [place] [address] [place] [address]      Dear Sheryl Martines:    Your doctor has referred you to the [facility]. You will be contacted by our office and an initial appointment will then be scheduled for you.    We look forward to seeing you soon. If you have any further questions, please contact us at [phone].       Sincerely,        [facility]   [address] [place] [id number]  [phone]

## 2018-12-18 LAB
BACTERIA BLD CULT: NORMAL
BACTERIA BLD CULT: NORMAL

## 2018-12-18 NOTE — NURSING
Pt records reviewed.   Pt will be referred to Hepatology.  Abnormal LFTs; holding statin  Initial referral received  from the workque.   Referring Provider/diagnosis  Kendrick Celis MD  Referral letter sent to patient.

## 2019-01-09 ENCOUNTER — TELEPHONE (OUTPATIENT)
Dept: ORTHOPEDICS | Facility: CLINIC | Age: 64
End: 2019-01-09

## 2019-01-09 NOTE — TELEPHONE ENCOUNTER
----- Message from Reema Harper sent at 1/9/2019  7:47 AM CST -----  Contact: Self  Pt is calling to reschedule her appt for tomorrow.  Pt says she cannot make it and requests a returned call.  Pt requested  cancel the appt.    She can be reached at [phone].    Thank you.

## 2019-01-09 NOTE — TELEPHONE ENCOUNTER
Notified pt daughter; regarding reschedule appointment. Pt will be out of town. New appointment 01/15/2019 at 10:45 am/mailed. Patient daughter  states verbal understanding and has no further questions.

## 2019-01-15 ENCOUNTER — HOSPITAL ENCOUNTER (OUTPATIENT)
Dept: RADIOLOGY | Facility: HOSPITAL | Age: 64
Discharge: HOME | End: 2019-01-15
Attending: PHYSICIAN ASSISTANT
Payer: MEDICARE

## 2019-01-15 ENCOUNTER — OFFICE VISIT (OUTPATIENT)
Dept: ORTHOPEDICS | Facility: CLINIC | Age: 64
End: 2019-01-15
Payer: MEDICARE

## 2019-01-15 VITALS — WEIGHT: 167.31 LBS | BODY MASS INDEX: 31.59 KG/M2 | HEIGHT: 61 IN

## 2019-01-15 DIAGNOSIS — T14.90XA TRAUMA: Primary | ICD-10-CM

## 2019-01-15 DIAGNOSIS — S82.201D CLOSED FRACTURE OF RIGHT TIBIA AND FIBULA WITH ROUTINE HEALING, SUBSEQUENT ENCOUNTER: ICD-10-CM

## 2019-01-15 DIAGNOSIS — M89.8X6 PAIN OF RIGHT TIBIA: ICD-10-CM

## 2019-01-15 DIAGNOSIS — S82.401D CLOSED FRACTURE OF RIGHT TIBIA AND FIBULA WITH ROUTINE HEALING, SUBSEQUENT ENCOUNTER: ICD-10-CM

## 2019-01-15 PROCEDURE — 73590 XR TIBIA FIBULA 2 VIEW RIGHT: ICD-10-PCS | Mod: 26,RT,, | Performed by: RADIOLOGY

## 2019-01-15 PROCEDURE — 3008F BODY MASS INDEX DOCD: CPT | Mod: S$GLB,,, | Performed by: PHYSICIAN ASSISTANT

## 2019-01-15 PROCEDURE — 99999 PR PBB SHADOW E&M-EST. PATIENT-LVL IV: ICD-10-PCS | Mod: PBBFAC,,, | Performed by: PHYSICIAN ASSISTANT

## 2019-01-15 PROCEDURE — 99213 PR OFFICE/OUTPT VISIT, EST, LEVL III, 20-29 MIN: ICD-10-PCS | Mod: S$GLB,,, | Performed by: PHYSICIAN ASSISTANT

## 2019-01-15 PROCEDURE — 3008F PR BODY MASS INDEX (BMI) DOCUMENTED: ICD-10-PCS | Mod: S$GLB,,, | Performed by: PHYSICIAN ASSISTANT

## 2019-01-15 PROCEDURE — 73590 X-RAY EXAM OF LOWER LEG: CPT | Mod: TC,RT

## 2019-01-15 PROCEDURE — 99999 PR PBB SHADOW E&M-EST. PATIENT-LVL IV: CPT | Mod: PBBFAC,,, | Performed by: PHYSICIAN ASSISTANT

## 2019-01-15 PROCEDURE — 73590 X-RAY EXAM OF LOWER LEG: CPT | Mod: 26,RT,, | Performed by: RADIOLOGY

## 2019-01-15 PROCEDURE — 99213 OFFICE O/P EST LOW 20 MIN: CPT | Mod: S$GLB,,, | Performed by: PHYSICIAN ASSISTANT

## 2019-01-15 RX ORDER — PRAZOSIN HYDROCHLORIDE 5 MG/1
CAPSULE ORAL
Refills: 13 | Status: ON HOLD | COMMUNITY
Start: 2018-11-05 | End: 2019-01-24

## 2019-01-15 RX ORDER — SIMVASTATIN 40 MG/1
40 TABLET, FILM COATED ORAL
COMMUNITY
Start: 2013-10-09

## 2019-01-15 RX ORDER — POTASSIUM CHLORIDE 750 MG/1
10 TABLET, EXTENDED RELEASE ORAL
Status: ON HOLD | COMMUNITY
Start: 2019-01-09 | End: 2019-03-13 | Stop reason: HOSPADM

## 2019-01-15 RX ORDER — TRAMADOL HYDROCHLORIDE 50 MG/1
TABLET ORAL
Refills: 0 | COMMUNITY
Start: 2018-12-19

## 2019-01-15 RX ORDER — CEPHALEXIN 750 MG/1
CAPSULE ORAL
Refills: 0 | Status: ON HOLD | COMMUNITY
Start: 2018-11-19 | End: 2019-03-13 | Stop reason: HOSPADM

## 2019-01-15 RX ORDER — BACLOFEN 10 MG/1
10 TABLET ORAL
Status: ON HOLD | COMMUNITY
Start: 2019-01-09 | End: 2019-01-24

## 2019-01-15 RX ORDER — CEFDINIR 300 MG/1
CAPSULE ORAL
Refills: 0 | Status: ON HOLD | COMMUNITY
Start: 2018-12-26 | End: 2019-03-13 | Stop reason: HOSPADM

## 2019-01-15 RX ORDER — ONDANSETRON 4 MG/1
4-8 TABLET, FILM COATED ORAL
Status: ON HOLD | COMMUNITY
Start: 2019-01-09 | End: 2019-03-13 | Stop reason: HOSPADM

## 2019-01-16 NOTE — PROGRESS NOTES
Subjective:      Patient ID: Sheryl Martines is a 63 y.o. female.    Chief Complaint: Post-op Evaluation (right tib)    HPI    Patient is a 63 y.o. female  with history of DM, osteoporosis and pontine stroke 2012 presents to clinic for follow up for right Non displaced oblique right tibia fracture at metaphysis into diaphysis secondary to falling form a standing height on 10/06/2018 after her knees gave out.  Due to patients comorbid medical conditions and at present her blood sugar is running very high she decided to be treated non-operative. She has been in a knee brace. She stated that she is doing ok. Pain is controlled. She is using a wheelchair to assist with ambulation.      Patient ambulates at baseline with Rollator.   On aspirin and plavix    Review of Systems   Constitution: Negative for chills and fever.   Cardiovascular: Negative for chest pain.   Respiratory: Negative for cough and shortness of breath.    Skin: Negative for color change, dry skin, itching, nail changes, poor wound healing and rash.   Musculoskeletal:        Right leg fracture.    Neurological: Negative for dizziness.   Psychiatric/Behavioral: Negative for altered mental status. The patient is not nervous/anxious.    All other systems reviewed and are negative.        Objective:      General    Constitutional: She is oriented to person, place, and time. She appears well-developed and well-nourished. No distress.   HENT:   Head: Atraumatic.   Eyes: Conjunctivae are normal.   Cardiovascular: Normal rate.    Pulmonary/Chest: Effort normal.   Neurological: She is alert and oriented to person, place, and time.   Psychiatric: She has a normal mood and affect. Her behavior is normal.           Right Knee Exam     Comments:  Presents to clinic with her daughter in wheelchair, brace in palce, quarter size wound to anterior lateral knee resolved no signs of infection  Very mild TTP to fracture site.   range of motion knee is 0-90       RADS:  Healing fractures proximal tibia and distal fibula similar.  Proximal fibular fracture not well visualized on today's study.        Assessment:       Encounter Diagnoses   Name Primary?    Trauma Yes    Closed fracture of right tibia and fibula with routine healing, subsequent encounter           Plan:       Discussed plan with patient and family, Continue non-operative treatment  - hinge knee brace, may begin to wean  -slowly advance weightbearing, explained to not cause any pain  - pain medication: no refill needed  - discussed proper nutrition  - She is to follow up with PCP regarding her stomach issues.   - return to clinic in 8 weeks at time x-ray of her tib fib is needed. Sooner is any problems or concerns

## 2019-01-17 NOTE — PT/OT/SLP DISCHARGE
Occupational Therapy Discharge Summary    Sheryl Martines  MRN: [id number]   Principal Problem: Acute metabolic encephalopathy      Patient Discharged from acute Occupational Therapy on 11/4/2018.  Please refer to prior OT note dated 11/2/2018 for functional status.    Assessment:      Patient appropriate for care in another setting.    Objective:     GOALS:   Multidisciplinary Problems     Occupational Therapy Goals        Problem: Occupational Therapy Goal    Goal Priority Disciplines Outcome Interventions   Occupational Therapy Goal     OT, PT/OT Ongoing (interventions implemented as appropriate)    Description:  Pt will perform oral care sitting in w/c or chair with set up assist.  Pt will self feeding with set up assist while seated upright.  Pt will sit EOB for 10 minutes without LOB during functional activity.                     Reasons for Discontinuation of Therapy Services  Transfer to alternate level of care.      Plan:     Patient Discharged to: Home with Home Health Service    NAVI Pratt  1/17/2019

## 2019-01-23 ENCOUNTER — HOSPITAL ENCOUNTER (INPATIENT)
Facility: HOSPITAL | Age: 64
LOS: 52 days | Discharge: HOME HEALTH | DRG: 853 | End: 2019-03-16
Attending: EMERGENCY MEDICINE | Admitting: EMERGENCY MEDICINE
Payer: MEDICARE

## 2019-01-23 DIAGNOSIS — T38.0X5S ADVERSE EFFECT OF ADRENAL CORTICAL STEROIDS, SEQUELA: ICD-10-CM

## 2019-01-23 DIAGNOSIS — R65.21 SEPTIC SHOCK: ICD-10-CM

## 2019-01-23 DIAGNOSIS — R53.81 DEBILITY: ICD-10-CM

## 2019-01-23 DIAGNOSIS — M26.79 TORI PRESENT ON RESIDUAL ALVEOLAR RIDGE OF MAXILLA: ICD-10-CM

## 2019-01-23 DIAGNOSIS — R65.20 SEVERE SEPSIS: ICD-10-CM

## 2019-01-23 DIAGNOSIS — R23.9 ALTERATION IN SKIN INTEGRITY DUE TO MOISTURE: ICD-10-CM

## 2019-01-23 DIAGNOSIS — I10 ESSENTIAL HYPERTENSION: ICD-10-CM

## 2019-01-23 DIAGNOSIS — N17.9 AKI (ACUTE KIDNEY INJURY): ICD-10-CM

## 2019-01-23 DIAGNOSIS — A41.9 SEPTIC SHOCK: ICD-10-CM

## 2019-01-23 DIAGNOSIS — J45.909 MODERATE ASTHMA WITHOUT COMPLICATION, UNSPECIFIED WHETHER PERSISTENT: ICD-10-CM

## 2019-01-23 DIAGNOSIS — I45.10 RBBB: ICD-10-CM

## 2019-01-23 DIAGNOSIS — Z78.9 ON TOTAL PARENTERAL NUTRITION (TPN): ICD-10-CM

## 2019-01-23 DIAGNOSIS — I47.10 SVT (SUPRAVENTRICULAR TACHYCARDIA): ICD-10-CM

## 2019-01-23 DIAGNOSIS — R23.9 ALTERATION IN SKIN INTEGRITY RELATED TO SURGICAL INCISION: ICD-10-CM

## 2019-01-23 DIAGNOSIS — E27.40 ADRENAL INSUFFICIENCY: ICD-10-CM

## 2019-01-23 DIAGNOSIS — N73.9 PELVIC ABSCESS IN FEMALE: ICD-10-CM

## 2019-01-23 DIAGNOSIS — E11.21 TYPE 2 DIABETES MELLITUS WITH DIABETIC NEPHROPATHY, WITH LONG-TERM CURRENT USE OF INSULIN: ICD-10-CM

## 2019-01-23 DIAGNOSIS — R00.1 BRADYCARDIA: ICD-10-CM

## 2019-01-23 DIAGNOSIS — R29.818: ICD-10-CM

## 2019-01-23 DIAGNOSIS — I44.2 IDIOVENTRICULAR RHYTHM: ICD-10-CM

## 2019-01-23 DIAGNOSIS — M79.603 ARM PAIN: ICD-10-CM

## 2019-01-23 DIAGNOSIS — Z79.4 TYPE 2 DIABETES MELLITUS WITH DIABETIC NEPHROPATHY, WITH LONG-TERM CURRENT USE OF INSULIN: ICD-10-CM

## 2019-01-23 DIAGNOSIS — I45.4 BUNDLE BRANCH BLOCK: ICD-10-CM

## 2019-01-23 DIAGNOSIS — R13.10 DYSPHAGIA, UNSPECIFIED TYPE: ICD-10-CM

## 2019-01-23 DIAGNOSIS — E66.3 OVERWEIGHT (BMI 25.0-29.9): ICD-10-CM

## 2019-01-23 DIAGNOSIS — R79.89 ELEVATED TROPONIN: ICD-10-CM

## 2019-01-23 DIAGNOSIS — I25.10 CORONARY ARTERY DISEASE INVOLVING NATIVE CORONARY ARTERY OF NATIVE HEART WITHOUT ANGINA PECTORIS: ICD-10-CM

## 2019-01-23 DIAGNOSIS — R40.0 SOMNOLENCE: ICD-10-CM

## 2019-01-23 DIAGNOSIS — R00.0 TACHYCARDIA: ICD-10-CM

## 2019-01-23 DIAGNOSIS — E44.0 MODERATE MALNUTRITION: ICD-10-CM

## 2019-01-23 DIAGNOSIS — T14.8XXA MULTIPLE SKIN TEARS: ICD-10-CM

## 2019-01-23 DIAGNOSIS — Z78.9 ON ENTERAL NUTRITION: ICD-10-CM

## 2019-01-23 DIAGNOSIS — E86.0 DEHYDRATION: ICD-10-CM

## 2019-01-23 DIAGNOSIS — R11.2 INTRACTABLE NAUSEA AND VOMITING: ICD-10-CM

## 2019-01-23 DIAGNOSIS — J18.9 PNEUMONIA OF RIGHT LOWER LOBE DUE TO INFECTIOUS ORGANISM: ICD-10-CM

## 2019-01-23 DIAGNOSIS — R47.1 DYSARTHRIA AND ANARTHRIA: ICD-10-CM

## 2019-01-23 DIAGNOSIS — R10.84 GENERALIZED ABDOMINAL PAIN: Primary | ICD-10-CM

## 2019-01-23 DIAGNOSIS — E43 SEVERE MALNUTRITION: ICD-10-CM

## 2019-01-23 DIAGNOSIS — A41.9 SEVERE SEPSIS: ICD-10-CM

## 2019-01-23 DIAGNOSIS — I50.33 ACUTE ON CHRONIC DIASTOLIC (CONGESTIVE) HEART FAILURE: ICD-10-CM

## 2019-01-23 LAB
ALBUMIN SERPL BCP-MCNC: 2.3 G/DL
ALP SERPL-CCNC: 417 U/L
ALT SERPL W/O P-5'-P-CCNC: 16 U/L
ANION GAP SERPL CALC-SCNC: 16 MMOL/L
AST SERPL-CCNC: 55 U/L
BACTERIA #/AREA URNS AUTO: ABNORMAL /HPF
BASOPHILS # BLD AUTO: 0.11 K/UL
BASOPHILS NFR BLD: 1.1 %
BILIRUB SERPL-MCNC: 1.7 MG/DL
BILIRUB UR QL STRIP: ABNORMAL
BUN SERPL-MCNC: 5 MG/DL (ref 6–30)
BUN SERPL-MCNC: 6 MG/DL
CALCIUM SERPL-MCNC: 9.4 MG/DL
CHLORIDE SERPL-SCNC: 93 MMOL/L (ref 95–110)
CHLORIDE SERPL-SCNC: 95 MMOL/L
CLARITY UR REFRACT.AUTO: ABNORMAL
CO2 SERPL-SCNC: 24 MMOL/L
COLOR UR AUTO: ABNORMAL
CREAT SERPL-MCNC: 0.5 MG/DL (ref 0.5–1.4)
CREAT SERPL-MCNC: 0.8 MG/DL
CRP SERPL-MCNC: 78 MG/L
DIFFERENTIAL METHOD: ABNORMAL
EOSINOPHIL # BLD AUTO: 0.1 K/UL
EOSINOPHIL NFR BLD: 1.3 %
ERYTHROCYTE [DISTWIDTH] IN BLOOD BY AUTOMATED COUNT: 18.4 %
ERYTHROCYTE [SEDIMENTATION RATE] IN BLOOD BY WESTERGREN METHOD: 17 MM/HR
EST. GFR  (AFRICAN AMERICAN): >60 ML/MIN/1.73 M^2
EST. GFR  (NON AFRICAN AMERICAN): >60 ML/MIN/1.73 M^2
GLUCOSE SERPL-MCNC: 79 MG/DL (ref 70–110)
GLUCOSE SERPL-MCNC: 81 MG/DL
GLUCOSE UR QL STRIP: NEGATIVE
HCT VFR BLD AUTO: 35.2 %
HCT VFR BLD CALC: 38 %PCV (ref 36–54)
HGB BLD-MCNC: 11.4 G/DL
HGB UR QL STRIP: NEGATIVE
HYALINE CASTS UR QL AUTO: 3 /LPF
IMM GRANULOCYTES # BLD AUTO: 0.06 K/UL
IMM GRANULOCYTES NFR BLD AUTO: 0.6 %
KETONES UR QL STRIP: ABNORMAL
LEUKOCYTE ESTERASE UR QL STRIP: ABNORMAL
LIPASE SERPL-CCNC: 15 U/L
LYMPHOCYTES # BLD AUTO: 2 K/UL
LYMPHOCYTES NFR BLD: 19.9 %
MCH RBC QN AUTO: 26.3 PG
MCHC RBC AUTO-ENTMCNC: 32.4 G/DL
MCV RBC AUTO: 81 FL
MICROSCOPIC COMMENT: ABNORMAL
MONOCYTES # BLD AUTO: 0.9 K/UL
MONOCYTES NFR BLD: 8.8 %
NEUTROPHILS # BLD AUTO: 7 K/UL
NEUTROPHILS NFR BLD: 68.3 %
NITRITE UR QL STRIP: NEGATIVE
NRBC BLD-RTO: 0 /100 WBC
PH UR STRIP: 6 [PH] (ref 5–8)
PLATELET # BLD AUTO: 240 K/UL
PMV BLD AUTO: 9.9 FL
POC IONIZED CALCIUM: 1.09 MMOL/L (ref 1.06–1.42)
POC TCO2 (MEASURED): 24 MMOL/L (ref 23–29)
POTASSIUM BLD-SCNC: 2.9 MMOL/L (ref 3.5–5.1)
POTASSIUM SERPL-SCNC: 3 MMOL/L
PROT SERPL-MCNC: 5.5 G/DL
PROT UR QL STRIP: ABNORMAL
RBC # BLD AUTO: 4.33 M/UL
RBC #/AREA URNS AUTO: 2 /HPF (ref 0–4)
SAMPLE: ABNORMAL
SODIUM BLD-SCNC: 135 MMOL/L (ref 136–145)
SODIUM SERPL-SCNC: 135 MMOL/L
SP GR UR STRIP: 1.02 (ref 1–1.03)
SQUAMOUS #/AREA URNS AUTO: 23 /HPF
URN SPEC COLLECT METH UR: ABNORMAL
WBC # BLD AUTO: 10.24 K/UL
WBC #/AREA URNS AUTO: 9 /HPF (ref 0–5)

## 2019-01-23 PROCEDURE — 25000242 PHARM REV CODE 250 ALT 637 W/ HCPCS: Performed by: EMERGENCY MEDICINE

## 2019-01-23 PROCEDURE — 25500020 PHARM REV CODE 255: Performed by: EMERGENCY MEDICINE

## 2019-01-23 PROCEDURE — 93005 ELECTROCARDIOGRAM TRACING: CPT

## 2019-01-23 PROCEDURE — 99223 PR INITIAL HOSPITAL CARE,LEVL III: ICD-10-PCS | Mod: ,,, | Performed by: SURGERY

## 2019-01-23 PROCEDURE — 99220 PR INITIAL OBSERVATION CARE,LEVL III: ICD-10-PCS | Mod: ,,, | Performed by: PHYSICIAN ASSISTANT

## 2019-01-23 PROCEDURE — 81001 URINALYSIS AUTO W/SCOPE: CPT

## 2019-01-23 PROCEDURE — C9113 INJ PANTOPRAZOLE SODIUM, VIA: HCPCS | Performed by: EMERGENCY MEDICINE

## 2019-01-23 PROCEDURE — 63600175 PHARM REV CODE 636 W HCPCS: Performed by: NURSE PRACTITIONER

## 2019-01-23 PROCEDURE — 83690 ASSAY OF LIPASE: CPT

## 2019-01-23 PROCEDURE — 96365 THER/PROPH/DIAG IV INF INIT: CPT

## 2019-01-23 PROCEDURE — 93010 ELECTROCARDIOGRAM REPORT: CPT | Mod: ,,, | Performed by: INTERNAL MEDICINE

## 2019-01-23 PROCEDURE — 63600175 PHARM REV CODE 636 W HCPCS: Performed by: EMERGENCY MEDICINE

## 2019-01-23 PROCEDURE — 96375 TX/PRO/DX INJ NEW DRUG ADDON: CPT

## 2019-01-23 PROCEDURE — 96361 HYDRATE IV INFUSION ADD-ON: CPT

## 2019-01-23 PROCEDURE — G0378 HOSPITAL OBSERVATION PER HR: HCPCS

## 2019-01-23 PROCEDURE — 99285 PR EMERGENCY DEPT VISIT,LEVEL V: ICD-10-PCS | Mod: ,,, | Performed by: NURSE PRACTITIONER

## 2019-01-23 PROCEDURE — 94640 AIRWAY INHALATION TREATMENT: CPT

## 2019-01-23 PROCEDURE — 25000003 PHARM REV CODE 250: Performed by: NURSE PRACTITIONER

## 2019-01-23 PROCEDURE — 85025 COMPLETE CBC W/AUTO DIFF WBC: CPT

## 2019-01-23 PROCEDURE — 93010 EKG 12-LEAD: ICD-10-PCS | Mod: ,,, | Performed by: INTERNAL MEDICINE

## 2019-01-23 PROCEDURE — 94761 N-INVAS EAR/PLS OXIMETRY MLT: CPT

## 2019-01-23 PROCEDURE — 99285 EMERGENCY DEPT VISIT HI MDM: CPT

## 2019-01-23 PROCEDURE — 99220 PR INITIAL OBSERVATION CARE,LEVL III: CPT | Mod: ,,, | Performed by: PHYSICIAN ASSISTANT

## 2019-01-23 PROCEDURE — 80053 COMPREHEN METABOLIC PANEL: CPT

## 2019-01-23 PROCEDURE — 99223 1ST HOSP IP/OBS HIGH 75: CPT | Mod: ,,, | Performed by: SURGERY

## 2019-01-23 PROCEDURE — 86140 C-REACTIVE PROTEIN: CPT

## 2019-01-23 PROCEDURE — 99285 EMERGENCY DEPT VISIT HI MDM: CPT | Mod: ,,, | Performed by: NURSE PRACTITIONER

## 2019-01-23 PROCEDURE — 85652 RBC SED RATE AUTOMATED: CPT

## 2019-01-23 PROCEDURE — 11000001 HC ACUTE MED/SURG PRIVATE ROOM

## 2019-01-23 PROCEDURE — 25000003 PHARM REV CODE 250: Performed by: EMERGENCY MEDICINE

## 2019-01-23 RX ORDER — ONDANSETRON 2 MG/ML
4 INJECTION INTRAMUSCULAR; INTRAVENOUS
Status: COMPLETED | OUTPATIENT
Start: 2019-01-23 | End: 2019-01-23

## 2019-01-23 RX ORDER — PANTOPRAZOLE SODIUM 40 MG/10ML
40 INJECTION, POWDER, LYOPHILIZED, FOR SOLUTION INTRAVENOUS
Status: COMPLETED | OUTPATIENT
Start: 2019-01-23 | End: 2019-01-23

## 2019-01-23 RX ORDER — ACETAMINOPHEN 10 MG/ML
1000 INJECTION, SOLUTION INTRAVENOUS ONCE
Status: COMPLETED | OUTPATIENT
Start: 2019-01-24 | End: 2019-01-24

## 2019-01-23 RX ORDER — POTASSIUM CHLORIDE 14.9 MG/ML
20 INJECTION INTRAVENOUS
Status: DISCONTINUED | OUTPATIENT
Start: 2019-01-23 | End: 2019-01-23 | Stop reason: SDUPTHER

## 2019-01-23 RX ORDER — POTASSIUM CHLORIDE 14.9 MG/ML
10 INJECTION INTRAVENOUS
Status: DISCONTINUED | OUTPATIENT
Start: 2019-01-23 | End: 2019-01-23 | Stop reason: SDUPTHER

## 2019-01-23 RX ORDER — POTASSIUM CHLORIDE 14.9 MG/ML
20 INJECTION INTRAVENOUS
Status: COMPLETED | OUTPATIENT
Start: 2019-01-23 | End: 2019-01-23

## 2019-01-23 RX ORDER — POTASSIUM CHLORIDE 7.45 MG/ML
10 INJECTION INTRAVENOUS
Status: DISCONTINUED | OUTPATIENT
Start: 2019-01-23 | End: 2019-01-23

## 2019-01-23 RX ORDER — POTASSIUM CHLORIDE 20 MEQ/15ML
40 SOLUTION ORAL
Status: COMPLETED | OUTPATIENT
Start: 2019-01-23 | End: 2019-01-23

## 2019-01-23 RX ORDER — IPRATROPIUM BROMIDE AND ALBUTEROL SULFATE 2.5; .5 MG/3ML; MG/3ML
3 SOLUTION RESPIRATORY (INHALATION)
Status: COMPLETED | OUTPATIENT
Start: 2019-01-23 | End: 2019-01-23

## 2019-01-23 RX ORDER — PANTOPRAZOLE SODIUM 40 MG/10ML
40 INJECTION, POWDER, LYOPHILIZED, FOR SOLUTION INTRAVENOUS
Status: DISCONTINUED | OUTPATIENT
Start: 2019-01-23 | End: 2019-01-23

## 2019-01-23 RX ORDER — METOCLOPRAMIDE HYDROCHLORIDE 5 MG/ML
10 INJECTION INTRAMUSCULAR; INTRAVENOUS
Status: COMPLETED | OUTPATIENT
Start: 2019-01-23 | End: 2019-01-23

## 2019-01-23 RX ADMIN — METOCLOPRAMIDE 10 MG: 5 INJECTION, SOLUTION INTRAMUSCULAR; INTRAVENOUS at 10:01

## 2019-01-23 RX ADMIN — SODIUM CHLORIDE 1000 ML: 0.9 INJECTION, SOLUTION INTRAVENOUS at 01:01

## 2019-01-23 RX ADMIN — IPRATROPIUM BROMIDE AND ALBUTEROL SULFATE 3 ML: .5; 3 SOLUTION RESPIRATORY (INHALATION) at 10:01

## 2019-01-23 RX ADMIN — PANTOPRAZOLE SODIUM 40 MG: 40 INJECTION, POWDER, FOR SOLUTION INTRAVENOUS at 10:01

## 2019-01-23 RX ADMIN — ONDANSETRON 4 MG: 2 INJECTION INTRAMUSCULAR; INTRAVENOUS at 01:01

## 2019-01-23 RX ADMIN — POTASSIUM CHLORIDE 20 MEQ: 14.9 INJECTION, SOLUTION INTRAVENOUS at 03:01

## 2019-01-23 RX ADMIN — SODIUM CHLORIDE 1000 ML: 0.9 INJECTION, SOLUTION INTRAVENOUS at 10:01

## 2019-01-23 RX ADMIN — POTASSIUM CHLORIDE 40 MEQ: 20 SOLUTION ORAL at 08:01

## 2019-01-23 RX ADMIN — IOHEXOL 75 ML: 350 INJECTION, SOLUTION INTRAVENOUS at 06:01

## 2019-01-23 NOTE — LETTER
January 29, 2019         [address]  [place] [address]  Phone: [phone]  Fax: [phone]       Patient: Sheryl Martines   YOB: 1955  Date of Visit: 01/29/2019    To Whom It May Concern:    Panfilo Harmon was at [facility] from 1/25-1/29 with her mother. If you have any questions or concerns, or if I can be of further assistance, please do not hesitate to contact me.    Sincerely,    Price Mackay MD

## 2019-01-23 NOTE — ED PROVIDER NOTES
"Encounter Date: 1/23/2019       History     Chief Complaint   Patient presents with    Abdominal Pain     n/v and abdominal pain; wheelchair bound at home; "weaker than usual".       Pt is a 64 yo female with medical history of asthma, COPD, CAD, diabetes, hypertension presenting to the ED for for increased abdominal pain, nausea, vomiting and decreased appetite.  Pt states she has had decreased appetite over the past few weeks.  Nausea and vomiting began this morning.  Patient states last bowel movement was yesterday.  Patient's family unsure if patient has any recent fevers.  Patient denies any chills, chest pain or shortness of breath          Review of patient's allergies indicates:   Allergen Reactions    Ace inhibitors Swelling    Carvedilol      Other reaction(s): Other (See Comments)  blister    Hydralazine analogues     Metformin Nausea And Vomiting    Tetracycline Itching    Tetracyclines Swelling    Travatan (with benzalkonium) [travoprost (benzalkonium)]     Travoprost Itching     Other reaction(s): Other (See Comments)  Blurry vision     Past Medical History:   Diagnosis Date    Abnormal LFTs 12/14/2018    Asthma     Closed compression fracture of fourth lumbar vertebra     COPD (chronic obstructive pulmonary disease)     Coronary artery disease     Diabetes mellitus     Fall 10/4/2018    Glaucoma     High cholesterol     Hypertension     Infected incision 9/20/2018    Intractable nausea and vomiting 1/23/2019    Iritis     Pulmonary embolus     S/P appendectomy 9/11/2018    Stroke     rt sided weakness.     Past Surgical History:   Procedure Laterality Date    ABDOMINAL SURGERY      APPENDECTOMY N/A 8/26/2018    Performed by Bernadine Melendrez MD at [facility] [place]    APPENDECTOMY, LAPAROSCOPIC---CONVERTED TO OPEN APPENDECTOMY @0950 N/A 8/26/2018    Performed by Bernadine Melendrez MD at [facility] [place]    BACK SURGERY      stimulator    CATARACT EXTRACTION      HYSTERECTOMY   "     Family History   Problem Relation Age of Onset    Cancer Father     Diabetes Brother     Multiple sclerosis Mother     Lupus Sister      Social History     Tobacco Use    Smoking status: Never Smoker    Smokeless tobacco: Never Used   Substance Use Topics    Alcohol use: No     Frequency: Never    Drug use: No     Review of Systems   Constitutional: Positive for activity change, appetite change and fatigue. Negative for chills and fever.   Respiratory: Positive for wheezing. Negative for cough, chest tightness and shortness of breath.    Cardiovascular: Negative for chest pain, palpitations and leg swelling.   Gastrointestinal: Positive for abdominal distention, abdominal pain, nausea and vomiting. Negative for constipation and diarrhea.   Genitourinary: Negative for decreased urine volume, difficulty urinating, dysuria and urgency.   Musculoskeletal: Positive for myalgias ( generalized ). Negative for arthralgias and back pain.   Skin: Negative for color change, rash and wound.   Neurological: Positive for weakness. Negative for dizziness, syncope, numbness and headaches.   Hematological: Does not bruise/bleed easily.       Physical Exam     Initial Vitals [01/23/19 1214]   BP Pulse Resp Temp SpO2   132/74 110 18 97.8 °F (36.6 °C) 95 %      MAP       --         Physical Exam    Nursing note and vitals reviewed.  Constitutional: She appears well-developed and well-nourished. She is cooperative. She appears distressed ( due to vomiting).   HENT:   Head: Normocephalic and atraumatic.   Mouth/Throat: Uvula is midline. Mucous membranes are pale and dry. Posterior oropharyngeal erythema present.   Eyes: Conjunctivae, EOM and lids are normal. Pupils are equal, round, and reactive to light.   Neck: Trachea normal, normal range of motion, full passive range of motion without pain and phonation normal. Neck supple. No spinous process tenderness and no muscular tenderness present. No JVD present.   Cardiovascular:  Regular rhythm. Tachycardia present.    Pulses:       Radial pulses are 2+ on the right side, and 2+ on the left side.   Pulmonary/Chest: Effort normal and breath sounds normal.   Abdominal: Soft. Normal appearance and bowel sounds are normal. There is tenderness in the right upper quadrant and right lower quadrant. There is no rigidity, no rebound and no guarding.       Musculoskeletal: Normal range of motion.   Neurological: She is alert and oriented to person, place, and time. She has normal strength. No sensory deficit. GCS eye subscore is 4. GCS verbal subscore is 5. GCS motor subscore is 6.   Skin: Skin is warm, dry and intact. Capillary refill takes less than 2 seconds. No abrasion and no rash noted. No cyanosis. Nails show no clubbing.         ED Course   Procedures  Labs Reviewed   CBC W/ AUTO DIFFERENTIAL - Abnormal; Notable for the following components:       Result Value    Hemoglobin 11.4 (*)     Hematocrit 35.2 (*)     MCV 81 (*)     MCH 26.3 (*)     RDW 18.4 (*)     Immature Granulocytes 0.6 (*)     Immature Grans (Abs) 0.06 (*)     All other components within normal limits   COMPREHENSIVE METABOLIC PANEL - Abnormal; Notable for the following components:    Sodium 135 (*)     Potassium 3.0 (*)     BUN, Bld 6 (*)     Total Protein 5.5 (*)     Albumin 2.3 (*)     Total Bilirubin 1.7 (*)     Alkaline Phosphatase 417 (*)     AST 55 (*)     All other components within normal limits   URINALYSIS, REFLEX TO URINE CULTURE - Abnormal; Notable for the following components:    Appearance, UA Hazy (*)     Protein, UA 2+ (*)     Ketones, UA 1+ (*)     Bilirubin (UA) 1+ (*)     Leukocytes, UA Trace (*)     All other components within normal limits    Narrative:     Preferred Collection Type->Urine, Clean Catch   URINALYSIS MICROSCOPIC - Abnormal; Notable for the following components:    WBC, UA 9 (*)     Hyaline Casts, UA 3 (*)     All other components within normal limits    Narrative:     Preferred Collection  Type->Urine, Clean Catch   C-REACTIVE PROTEIN - Abnormal; Notable for the following components:    CRP 78.0 (*)     All other components within normal limits    Narrative:     add on test c reactive protein per dr jimmy hong order #[id number]   01/23/2019  22:38    ISTAT PROCEDURE - Abnormal; Notable for the following components:    POC BUN 5 (*)     POC Sodium 135 (*)     POC Potassium 2.9 (*)     POC Chloride 93 (*)     All other components within normal limits   LIPASE   SEDIMENTATION RATE   C-REACTIVE PROTEIN   SEDIMENTATION RATE   ISTAT CHEM8          Imaging Results          CT Abdomen Pelvis With Contrast (Final result)     Abnormal  Result time 01/23/19 18:53:21    Final result by Tonny Rodgers MD (01/23/19 18:53:21)                 Impression:      Posterior right hemipelvis rim enhancing fluid collection, suspicious for abscess.  Possible associated retained appendicolith.    Adjacent urinary bladder wall thickening, suspicious for superimposed cystitis.    This report was flagged in Epic as abnormal.      Electronically signed by: Tonny Rodgers MD  Date:    01/23/2019  Time:    18:53             Narrative:    EXAMINATION:  CT ABDOMEN PELVIS WITH CONTRAST    CLINICAL HISTORY:  Nausea, vomiting, diarrhea;    TECHNIQUE:  Low dose axial images, sagittal and coronal reformations were obtained from the lung bases to the pubic symphysis following the IV administration of 75 mL of Omnipaque 350 .  Oral contrast was not given.    COMPARISON:  CT and ultrasound exams dating back to December 12, 2018    FINDINGS:  Visualized lower chest is unremarkable.    The liver demonstrates diffuse steatosis, unchanged.    The gallbladder, pancreas, adrenals, and spleen are unremarkable.    Scattered colonic diverticula.  No bowel wall thickening or dilation.    No adenopathy.    Unchanged small right renal cyst.  The kidneys and ureters are otherwise unremarkable.    The urinary bladder demonstrates circumferential wall  thickening.    Evaluation of the pelvis is limited by beam hardening artifacts caused by left hip arthroplasty hardware.    In the right hemipelvis at the site of prior appendectomy, there is an irregular shaped rim enhancing fluid collection measuring at least 4.0 x 3.0 cm.  Associated surrounding fat stranding and inflammatory change.  This fluid collection abuts the superior right aspect of the urinary bladder dome.  Possible associated retained appendicolith, to the right of the fluid collection.    Hysterectomy.    Body wall soft tissues are unremarkable.    No acute bone abnormality.  Unchanged multilevel lumbar spine compression deformities.                                       APC / Resident Notes:   Emergent evaluation of a 62 yo female patient presenting to the ER with chief complaint of right sided abdominal pain.  Pt states she started with nausea and vomiting this morning.  Pt states she has had a decreased appetite since appendectomy in August.  On exam, pt A&O x3.  Cap refill greater than 3 sec.  Abdomen is soft with tenderness noted in the right upper and right lower quadrant.  Mass noted to right lower quadrant.  Patient states mass has been present since surgery.  Breath sounds clear bilaterally.  No pedal edema noted.  I will get labs, imaging, medicate, hydrate and reassess.  Differential diagnoses include but are not limited to appendicitis, gastroenteritis, colitis, irritable bowel syndrome, urinary tract infection, sexually transmitted disease, hernia, ovarian torsion, pelvic inflammatory disease, intestinal obstruction, abdominal aortic aneurysm, cholecystitis, cholelithiasis, hepatitis, appendicitis, peptic ulcer disease, pancreatitis, gastritis, pneumonia, pyelonephritis, musculoskeletal, others.  I discussed the care of this patient with my Supervising Physician.      Patient's i-STAT showing a potassium of 2.9.  Will give IV potassium.  CBC unremarkable.  H&H stable at 11.4 and 35.2.   WBC stable at 10.24.  CMP showing an elevated total bili of 1.7 with an alk-phos of 417 AST of 55.  This is near patient's baseline.  Lipase negative at 15.  CT showing posterior right hemipelvis rim enhancing fluid collection, suspicious for abscess.  Possible associated retained appendicolith.  General surgery consulted.  Will be down to see patient.    Pt signed out to Dr. Lawrence pending surgery recommendations                   Clinical Impression:   The primary encounter diagnosis was Generalized abdominal pain. Diagnoses of Dehydration and Severe sepsis were also pertinent to this visit.      Disposition:   Condition: Stable                        Trisha Rollins NP  01/25/19 0807

## 2019-01-24 ENCOUNTER — ANESTHESIA EVENT (OUTPATIENT)
Dept: SURGERY | Facility: HOSPITAL | Age: 64
DRG: 853 | End: 2019-01-24
Payer: MEDICARE

## 2019-01-24 PROBLEM — A41.9 SEVERE SEPSIS: Status: ACTIVE | Noted: 2019-01-24

## 2019-01-24 PROBLEM — R10.84 GENERALIZED ABDOMINAL PAIN: Status: ACTIVE | Noted: 2019-01-24

## 2019-01-24 PROBLEM — E87.6 HYPOKALEMIA: Status: ACTIVE | Noted: 2019-01-24

## 2019-01-24 PROBLEM — R65.20 SEVERE SEPSIS: Status: ACTIVE | Noted: 2019-01-24

## 2019-01-24 PROBLEM — E86.0 DEHYDRATION: Status: ACTIVE | Noted: 2019-01-24

## 2019-01-24 PROBLEM — R53.81 DEBILITY: Status: ACTIVE | Noted: 2019-01-24

## 2019-01-24 LAB
ALBUMIN SERPL BCP-MCNC: 1.9 G/DL
ALBUMIN SERPL BCP-MCNC: 2 G/DL
ALP SERPL-CCNC: 360 U/L
ALP SERPL-CCNC: 372 U/L
ALT SERPL W/O P-5'-P-CCNC: 16 U/L
ALT SERPL W/O P-5'-P-CCNC: 19 U/L
ANION GAP SERPL CALC-SCNC: 12 MMOL/L
ANION GAP SERPL CALC-SCNC: 14 MMOL/L
AST SERPL-CCNC: 58 U/L
AST SERPL-CCNC: 80 U/L
BASOPHILS # BLD AUTO: 0.08 K/UL
BASOPHILS # BLD AUTO: 0.09 K/UL
BASOPHILS NFR BLD: 0.7 %
BASOPHILS NFR BLD: 0.7 %
BILIRUB SERPL-MCNC: 1.6 MG/DL
BILIRUB SERPL-MCNC: 1.8 MG/DL
BUN SERPL-MCNC: 4 MG/DL
BUN SERPL-MCNC: 5 MG/DL
CA-I BLDV-SCNC: 1.15 MMOL/L
CALCIUM SERPL-MCNC: 8.5 MG/DL
CALCIUM SERPL-MCNC: 8.8 MG/DL
CHLORIDE SERPL-SCNC: 102 MMOL/L
CHLORIDE SERPL-SCNC: 104 MMOL/L
CO2 SERPL-SCNC: 20 MMOL/L
CO2 SERPL-SCNC: 21 MMOL/L
CREAT SERPL-MCNC: 0.7 MG/DL
CREAT SERPL-MCNC: 0.8 MG/DL
DIFFERENTIAL METHOD: ABNORMAL
DIFFERENTIAL METHOD: ABNORMAL
EOSINOPHIL # BLD AUTO: 0.1 K/UL
EOSINOPHIL # BLD AUTO: 0.2 K/UL
EOSINOPHIL NFR BLD: 0.6 %
EOSINOPHIL NFR BLD: 1.5 %
ERYTHROCYTE [DISTWIDTH] IN BLOOD BY AUTOMATED COUNT: 17.9 %
ERYTHROCYTE [DISTWIDTH] IN BLOOD BY AUTOMATED COUNT: 18.1 %
EST. GFR  (AFRICAN AMERICAN): >60 ML/MIN/1.73 M^2
EST. GFR  (AFRICAN AMERICAN): >60 ML/MIN/1.73 M^2
EST. GFR  (NON AFRICAN AMERICAN): >60 ML/MIN/1.73 M^2
EST. GFR  (NON AFRICAN AMERICAN): >60 ML/MIN/1.73 M^2
ESTIMATED AVG GLUCOSE: 88 MG/DL
FERRITIN SERPL-MCNC: 990 NG/ML
FOLATE SERPL-MCNC: 2.1 NG/ML
GLUCOSE SERPL-MCNC: 52 MG/DL
GLUCOSE SERPL-MCNC: 73 MG/DL
HBA1C MFR BLD HPLC: 4.7 %
HCT VFR BLD AUTO: 27.6 %
HCT VFR BLD AUTO: 28.8 %
HGB BLD-MCNC: 9.4 G/DL
HGB BLD-MCNC: 9.4 G/DL
IMM GRANULOCYTES # BLD AUTO: 0.06 K/UL
IMM GRANULOCYTES # BLD AUTO: 0.06 K/UL
IMM GRANULOCYTES NFR BLD AUTO: 0.4 %
IMM GRANULOCYTES NFR BLD AUTO: 0.5 %
IRON SERPL-MCNC: 53 UG/DL
LACTATE SERPL-SCNC: 4.6 MMOL/L
LACTATE SERPL-SCNC: 5.1 MMOL/L
LACTATE SERPL-SCNC: 5.6 MMOL/L
LACTATE SERPL-SCNC: 6.8 MMOL/L
LYMPHOCYTES # BLD AUTO: 2.5 K/UL
LYMPHOCYTES # BLD AUTO: 2.5 K/UL
LYMPHOCYTES NFR BLD: 18.9 %
LYMPHOCYTES NFR BLD: 20.8 %
MAGNESIUM SERPL-MCNC: 1 MG/DL
MAGNESIUM SERPL-MCNC: 1.6 MG/DL
MCH RBC QN AUTO: 25.8 PG
MCH RBC QN AUTO: 25.8 PG
MCHC RBC AUTO-ENTMCNC: 32.6 G/DL
MCHC RBC AUTO-ENTMCNC: 34.1 G/DL
MCV RBC AUTO: 76 FL
MCV RBC AUTO: 79 FL
MONOCYTES # BLD AUTO: 0.9 K/UL
MONOCYTES # BLD AUTO: 1.2 K/UL
MONOCYTES NFR BLD: 7.6 %
MONOCYTES NFR BLD: 9 %
NEUTROPHILS # BLD AUTO: 8.3 K/UL
NEUTROPHILS # BLD AUTO: 9.5 K/UL
NEUTROPHILS NFR BLD: 68.9 %
NEUTROPHILS NFR BLD: 70.4 %
NRBC BLD-RTO: 0 /100 WBC
NRBC BLD-RTO: 0 /100 WBC
PHOSPHATE SERPL-MCNC: 2 MG/DL
PHOSPHATE SERPL-MCNC: 2.4 MG/DL
PLATELET # BLD AUTO: 183 K/UL
PLATELET # BLD AUTO: 211 K/UL
PMV BLD AUTO: 10.8 FL
PMV BLD AUTO: 9.4 FL
POCT GLUCOSE: 129 MG/DL (ref 70–110)
POCT GLUCOSE: 134 MG/DL (ref 70–110)
POCT GLUCOSE: 53 MG/DL (ref 70–110)
POCT GLUCOSE: 63 MG/DL (ref 70–110)
POTASSIUM SERPL-SCNC: 3.2 MMOL/L
POTASSIUM SERPL-SCNC: 3.5 MMOL/L
PREALB SERPL-MCNC: 5 MG/DL
PROT SERPL-MCNC: 4.6 G/DL
PROT SERPL-MCNC: 4.8 G/DL
RBC # BLD AUTO: 3.64 M/UL
RBC # BLD AUTO: 3.65 M/UL
SATURATED IRON: 64 %
SODIUM SERPL-SCNC: 136 MMOL/L
SODIUM SERPL-SCNC: 137 MMOL/L
TOTAL IRON BINDING CAPACITY: 83 UG/DL
TRANSFERRIN SERPL-MCNC: 56 MG/DL
VIT B12 SERPL-MCNC: 1523 PG/ML
WBC # BLD AUTO: 12.04 K/UL
WBC # BLD AUTO: 13.42 K/UL

## 2019-01-24 PROCEDURE — 82330 ASSAY OF CALCIUM: CPT

## 2019-01-24 PROCEDURE — 99233 PR SUBSEQUENT HOSPITAL CARE,LEVL III: ICD-10-PCS | Mod: ,,, | Performed by: PHYSICIAN ASSISTANT

## 2019-01-24 PROCEDURE — 25000003 PHARM REV CODE 250: Performed by: PHYSICIAN ASSISTANT

## 2019-01-24 PROCEDURE — 84134 ASSAY OF PREALBUMIN: CPT

## 2019-01-24 PROCEDURE — 82746 ASSAY OF FOLIC ACID SERUM: CPT

## 2019-01-24 PROCEDURE — 63600175 PHARM REV CODE 636 W HCPCS: Performed by: SURGERY

## 2019-01-24 PROCEDURE — 99232 PR SUBSEQUENT HOSPITAL CARE,LEVL II: ICD-10-PCS | Mod: 57,,, | Performed by: SURGERY

## 2019-01-24 PROCEDURE — 87186 SC STD MICRODIL/AGAR DIL: CPT

## 2019-01-24 PROCEDURE — 82728 ASSAY OF FERRITIN: CPT

## 2019-01-24 PROCEDURE — 99232 SBSQ HOSP IP/OBS MODERATE 35: CPT | Mod: 57,,, | Performed by: SURGERY

## 2019-01-24 PROCEDURE — 36415 COLL VENOUS BLD VENIPUNCTURE: CPT

## 2019-01-24 PROCEDURE — 87077 CULTURE AEROBIC IDENTIFY: CPT

## 2019-01-24 PROCEDURE — 63600175 PHARM REV CODE 636 W HCPCS: Performed by: PHYSICIAN ASSISTANT

## 2019-01-24 PROCEDURE — 82607 VITAMIN B-12: CPT

## 2019-01-24 PROCEDURE — 83605 ASSAY OF LACTIC ACID: CPT | Mod: 91

## 2019-01-24 PROCEDURE — 83540 ASSAY OF IRON: CPT

## 2019-01-24 PROCEDURE — 99233 SBSQ HOSP IP/OBS HIGH 50: CPT | Mod: ,,, | Performed by: PHYSICIAN ASSISTANT

## 2019-01-24 PROCEDURE — 25000003 PHARM REV CODE 250: Performed by: STUDENT IN AN ORGANIZED HEALTH CARE EDUCATION/TRAINING PROGRAM

## 2019-01-24 PROCEDURE — 80053 COMPREHEN METABOLIC PANEL: CPT | Mod: 91

## 2019-01-24 PROCEDURE — 25000242 PHARM REV CODE 250 ALT 637 W/ HCPCS: Performed by: PHYSICIAN ASSISTANT

## 2019-01-24 PROCEDURE — 85025 COMPLETE CBC W/AUTO DIFF WBC: CPT

## 2019-01-24 PROCEDURE — 83735 ASSAY OF MAGNESIUM: CPT | Mod: 91

## 2019-01-24 PROCEDURE — 25000003 PHARM REV CODE 250: Performed by: SURGERY

## 2019-01-24 PROCEDURE — 84100 ASSAY OF PHOSPHORUS: CPT

## 2019-01-24 PROCEDURE — 87040 BLOOD CULTURE FOR BACTERIA: CPT | Mod: 59

## 2019-01-24 PROCEDURE — C9113 INJ PANTOPRAZOLE SODIUM, VIA: HCPCS | Performed by: PHYSICIAN ASSISTANT

## 2019-01-24 PROCEDURE — 83036 HEMOGLOBIN GLYCOSYLATED A1C: CPT

## 2019-01-24 PROCEDURE — 83921 ORGANIC ACID SINGLE QUANT: CPT

## 2019-01-24 PROCEDURE — 11000001 HC ACUTE MED/SURG PRIVATE ROOM

## 2019-01-24 PROCEDURE — 83735 ASSAY OF MAGNESIUM: CPT

## 2019-01-24 PROCEDURE — 84100 ASSAY OF PHOSPHORUS: CPT | Mod: 91

## 2019-01-24 RX ORDER — DICLOFENAC SODIUM 10 MG/G
2 GEL TOPICAL DAILY PRN
Status: DISCONTINUED | OUTPATIENT
Start: 2019-01-24 | End: 2019-03-16 | Stop reason: HOSPADM

## 2019-01-24 RX ORDER — DEXTROSE 50 % IN WATER (D50W) INTRAVENOUS SYRINGE
12.5
Status: DISCONTINUED | OUTPATIENT
Start: 2019-01-24 | End: 2019-01-31

## 2019-01-24 RX ORDER — IBUPROFEN 200 MG
16 TABLET ORAL
Status: DISCONTINUED | OUTPATIENT
Start: 2019-01-24 | End: 2019-01-31

## 2019-01-24 RX ORDER — DEXTROSE 50 % IN WATER (D50W) INTRAVENOUS SYRINGE
25
Status: DISCONTINUED | OUTPATIENT
Start: 2019-01-24 | End: 2019-01-31

## 2019-01-24 RX ORDER — LORAZEPAM/0.9% SODIUM CHLORIDE 100MG/0.1L
2 PLASTIC BAG, INJECTION (ML) INTRAVENOUS
Status: COMPLETED | OUTPATIENT
Start: 2019-01-24 | End: 2019-01-24

## 2019-01-24 RX ORDER — MAGNESIUM SULFATE HEPTAHYDRATE 40 MG/ML
2 INJECTION, SOLUTION INTRAVENOUS ONCE
Status: COMPLETED | OUTPATIENT
Start: 2019-01-24 | End: 2019-01-24

## 2019-01-24 RX ORDER — SIMVASTATIN 40 MG/1
40 TABLET, FILM COATED ORAL NIGHTLY
Status: DISCONTINUED | OUTPATIENT
Start: 2019-01-24 | End: 2019-01-24

## 2019-01-24 RX ORDER — SUCRALFATE 1 G/1
1 TABLET ORAL
Status: DISCONTINUED | OUTPATIENT
Start: 2019-01-24 | End: 2019-01-25

## 2019-01-24 RX ORDER — POTASSIUM CHLORIDE 20 MEQ/1
40 TABLET, EXTENDED RELEASE ORAL ONCE
Status: DISCONTINUED | OUTPATIENT
Start: 2019-01-24 | End: 2019-01-25

## 2019-01-24 RX ORDER — LOSARTAN POTASSIUM 25 MG/1
100 TABLET ORAL DAILY
Status: DISCONTINUED | OUTPATIENT
Start: 2019-01-24 | End: 2019-01-25

## 2019-01-24 RX ORDER — ACETAMINOPHEN 325 MG/1
650 TABLET ORAL EVERY 4 HOURS PRN
Status: DISCONTINUED | OUTPATIENT
Start: 2019-01-24 | End: 2019-01-25

## 2019-01-24 RX ORDER — SODIUM CHLORIDE, SODIUM LACTATE, POTASSIUM CHLORIDE, CALCIUM CHLORIDE 600; 310; 30; 20 MG/100ML; MG/100ML; MG/100ML; MG/100ML
INJECTION, SOLUTION INTRAVENOUS CONTINUOUS
Status: ACTIVE | OUTPATIENT
Start: 2019-01-24 | End: 2019-01-24

## 2019-01-24 RX ORDER — KETOROLAC TROMETHAMINE 30 MG/ML
15 INJECTION, SOLUTION INTRAMUSCULAR; INTRAVENOUS EVERY 6 HOURS PRN
Status: DISCONTINUED | OUTPATIENT
Start: 2019-01-24 | End: 2019-01-25

## 2019-01-24 RX ORDER — RAMELTEON 8 MG/1
8 TABLET ORAL NIGHTLY PRN
Status: DISCONTINUED | OUTPATIENT
Start: 2019-01-24 | End: 2019-01-26

## 2019-01-24 RX ORDER — PANTOPRAZOLE SODIUM 40 MG/10ML
40 INJECTION, POWDER, LYOPHILIZED, FOR SOLUTION INTRAVENOUS EVERY 12 HOURS
Status: DISCONTINUED | OUTPATIENT
Start: 2019-01-24 | End: 2019-02-19

## 2019-01-24 RX ORDER — SODIUM,POTASSIUM PHOSPHATES 280-250MG
2 POWDER IN PACKET (EA) ORAL EVERY 4 HOURS
Status: DISCONTINUED | OUTPATIENT
Start: 2019-01-24 | End: 2019-01-25

## 2019-01-24 RX ORDER — IPRATROPIUM BROMIDE AND ALBUTEROL SULFATE 2.5; .5 MG/3ML; MG/3ML
3 SOLUTION RESPIRATORY (INHALATION) EVERY 4 HOURS PRN
Status: DISCONTINUED | OUTPATIENT
Start: 2019-01-24 | End: 2019-01-25

## 2019-01-24 RX ORDER — PROMETHAZINE HYDROCHLORIDE 12.5 MG/1
12.5 TABLET ORAL EVERY 6 HOURS PRN
Status: DISCONTINUED | OUTPATIENT
Start: 2019-01-24 | End: 2019-01-25

## 2019-01-24 RX ORDER — VANCOMYCIN HCL IN 5 % DEXTROSE 1G/250ML
1000 PLASTIC BAG, INJECTION (ML) INTRAVENOUS
Status: DISCONTINUED | OUTPATIENT
Start: 2019-01-24 | End: 2019-01-28

## 2019-01-24 RX ORDER — GLUCAGON 1 MG
1 KIT INJECTION
Status: DISCONTINUED | OUTPATIENT
Start: 2019-01-24 | End: 2019-01-31

## 2019-01-24 RX ORDER — PYRIDOXINE HCL (VITAMIN B6) 25 MG
50 TABLET ORAL DAILY
Status: DISCONTINUED | OUTPATIENT
Start: 2019-01-24 | End: 2019-01-31

## 2019-01-24 RX ORDER — ASPIRIN 81 MG/1
81 TABLET ORAL DAILY
Status: DISCONTINUED | OUTPATIENT
Start: 2019-01-24 | End: 2019-01-25

## 2019-01-24 RX ORDER — ONDANSETRON 2 MG/ML
4 INJECTION INTRAMUSCULAR; INTRAVENOUS EVERY 6 HOURS
Status: DISCONTINUED | OUTPATIENT
Start: 2019-01-24 | End: 2019-01-25

## 2019-01-24 RX ORDER — MORPHINE SULFATE 2 MG/ML
1 INJECTION, SOLUTION INTRAMUSCULAR; INTRAVENOUS EVERY 8 HOURS
Status: DISCONTINUED | OUTPATIENT
Start: 2019-01-24 | End: 2019-01-25

## 2019-01-24 RX ORDER — DULOXETIN HYDROCHLORIDE 60 MG/1
60 CAPSULE, DELAYED RELEASE ORAL DAILY
Status: DISCONTINUED | OUTPATIENT
Start: 2019-01-24 | End: 2019-01-26

## 2019-01-24 RX ORDER — AMOXICILLIN 250 MG
1 CAPSULE ORAL 2 TIMES DAILY PRN
Status: DISCONTINUED | OUTPATIENT
Start: 2019-01-24 | End: 2019-01-24

## 2019-01-24 RX ORDER — SODIUM CHLORIDE 0.9 % (FLUSH) 0.9 %
5 SYRINGE (ML) INJECTION
Status: DISCONTINUED | OUTPATIENT
Start: 2019-01-24 | End: 2019-02-19

## 2019-01-24 RX ORDER — DILTIAZEM HYDROCHLORIDE 180 MG/1
180 CAPSULE, COATED, EXTENDED RELEASE ORAL DAILY
Status: DISCONTINUED | OUTPATIENT
Start: 2019-01-24 | End: 2019-01-26

## 2019-01-24 RX ORDER — CLOPIDOGREL BISULFATE 75 MG/1
75 TABLET ORAL DAILY
Status: DISCONTINUED | OUTPATIENT
Start: 2019-01-24 | End: 2019-01-24

## 2019-01-24 RX ORDER — IBUPROFEN 200 MG
24 TABLET ORAL
Status: DISCONTINUED | OUTPATIENT
Start: 2019-01-24 | End: 2019-01-31

## 2019-01-24 RX ORDER — FLUTICASONE FUROATE AND VILANTEROL 100; 25 UG/1; UG/1
1 POWDER RESPIRATORY (INHALATION) DAILY
Status: DISCONTINUED | OUTPATIENT
Start: 2019-01-24 | End: 2019-01-28

## 2019-01-24 RX ORDER — GABAPENTIN 300 MG/1
300 CAPSULE ORAL DAILY
Status: DISCONTINUED | OUTPATIENT
Start: 2019-01-24 | End: 2019-01-25

## 2019-01-24 RX ORDER — BACLOFEN 10 MG/1
10 TABLET ORAL 2 TIMES DAILY PRN
Status: DISCONTINUED | OUTPATIENT
Start: 2019-01-24 | End: 2019-01-24

## 2019-01-24 RX ADMIN — MAGNESIUM SULFATE IN WATER 2 G: 40 INJECTION, SOLUTION INTRAVENOUS at 08:01

## 2019-01-24 RX ADMIN — KETOROLAC TROMETHAMINE 15 MG: 30 INJECTION, SOLUTION INTRAMUSCULAR at 12:01

## 2019-01-24 RX ADMIN — Medication 1 MG: at 10:01

## 2019-01-24 RX ADMIN — DEXTROSE MONOHYDRATE 12.5 G: 500 INJECTION PARENTERAL at 11:01

## 2019-01-24 RX ADMIN — MAGNESIUM SULFATE IN WATER 2 G: 40 INJECTION, SOLUTION INTRAVENOUS at 10:01

## 2019-01-24 RX ADMIN — PANTOPRAZOLE SODIUM 40 MG: 40 INJECTION, POWDER, LYOPHILIZED, FOR SOLUTION INTRAVENOUS at 09:01

## 2019-01-24 RX ADMIN — PANTOPRAZOLE SODIUM 40 MG: 40 INJECTION, POWDER, LYOPHILIZED, FOR SOLUTION INTRAVENOUS at 08:01

## 2019-01-24 RX ADMIN — SODIUM CHLORIDE, SODIUM LACTATE, POTASSIUM CHLORIDE, AND CALCIUM CHLORIDE: .6; .31; .03; .02 INJECTION, SOLUTION INTRAVENOUS at 01:01

## 2019-01-24 RX ADMIN — PROMETHAZINE HYDROCHLORIDE 12.5 MG: 12.5 TABLET ORAL at 08:01

## 2019-01-24 RX ADMIN — ONDANSETRON 4 MG: 2 INJECTION INTRAMUSCULAR; INTRAVENOUS at 01:01

## 2019-01-24 RX ADMIN — THEOPHYLLINE ANHYDROUS 300 MG: 100 CAPSULE, EXTENDED RELEASE ORAL at 08:01

## 2019-01-24 RX ADMIN — SODIUM CHLORIDE, SODIUM LACTATE, POTASSIUM CHLORIDE, AND CALCIUM CHLORIDE 1000 ML: .6; .31; .03; .02 INJECTION, SOLUTION INTRAVENOUS at 12:01

## 2019-01-24 RX ADMIN — PIPERACILLIN AND TAZOBACTAM 4.5 G: 4; .5 INJECTION, POWDER, LYOPHILIZED, FOR SOLUTION INTRAVENOUS; PARENTERAL at 08:01

## 2019-01-24 RX ADMIN — ONDANSETRON 4 MG: 2 INJECTION INTRAMUSCULAR; INTRAVENOUS at 11:01

## 2019-01-24 RX ADMIN — ACETAMINOPHEN 1000 MG: 10 INJECTION, SOLUTION INTRAVENOUS at 12:01

## 2019-01-24 RX ADMIN — FLUTICASONE FUROATE AND VILANTEROL TRIFENATATE 1 PUFF: 100; 25 POWDER RESPIRATORY (INHALATION) at 08:01

## 2019-01-24 RX ADMIN — SODIUM CHLORIDE 1000 ML: 0.9 INJECTION, SOLUTION INTRAVENOUS at 10:01

## 2019-01-24 RX ADMIN — ONDANSETRON 4 MG: 2 INJECTION INTRAMUSCULAR; INTRAVENOUS at 05:01

## 2019-01-24 RX ADMIN — DEXTROSE MONOHYDRATE 12.5 G: 500 INJECTION PARENTERAL at 05:01

## 2019-01-24 RX ADMIN — SODIUM CHLORIDE 1000 ML: 0.9 INJECTION, SOLUTION INTRAVENOUS at 09:01

## 2019-01-24 RX ADMIN — VANCOMYCIN HYDROCHLORIDE 1000 MG: 1 INJECTION, POWDER, FOR SOLUTION INTRAVENOUS at 10:01

## 2019-01-24 RX ADMIN — PIPERACILLIN AND TAZOBACTAM 4.5 G: 4; .5 INJECTION, POWDER, LYOPHILIZED, FOR SOLUTION INTRAVENOUS; PARENTERAL at 12:01

## 2019-01-24 RX ADMIN — SODIUM CHLORIDE, SODIUM LACTATE, POTASSIUM CHLORIDE, AND CALCIUM CHLORIDE 1000 ML: .6; .31; .03; .02 INJECTION, SOLUTION INTRAVENOUS at 04:01

## 2019-01-24 RX ADMIN — Medication 1 MG: at 05:01

## 2019-01-24 RX ADMIN — Medication 1 MG: at 02:01

## 2019-01-24 RX ADMIN — MAGNESIUM SULFATE IN WATER 2 G: 40 INJECTION, SOLUTION INTRAVENOUS at 05:01

## 2019-01-24 NOTE — HOSPITAL COURSE
Patient is a 63 y.o. with chronic diastolic heart failure, COPD on home oxygen at 2 liters, chronic debility, CAD, Type 2 diabetes, HTN, previous CVA with residual right sided weakness, prior appendectomy in 8/2018 complicated by C. Diff infection, failure to thrive admitted to the hospital on 1/23/2019 with nausea vomiting and abdominal pain found to have fluid collection in R hemipelvis. Started on vanc/zosyn. Surgery consulted, s/p ex lap, washout and pelvic abscess drainage 1/25. Post-op course complicated by septic shock secondary to MRSA bacteremia for which she received 2 weeks of IV vancomycin and briefly required pressors.  Wound culture also grew Bacteroides and patient completed 4 weeks of Cipro and Flagyl.  Patient remained intubated postoperatively and was successfully extubated on 01/28.  Initially, she was stabilized and transferred to the floor.  She failed speech language therapy and was started on TPN with subsequent placement of IR guided G-tube in transition to tube feeds on 02/10.  On the floor, patient was making some progress and being followed by [facility]; however, a rapid response was called on 02/19 due to somnolence.  ABG was consistent with acute hypercapnic respiratory failure, and she was placed on BiPAP and transferred back to SICU.  Infectious workup was significant for a UA with 39 WBCs, moderate bacteria, and moderate yeast; urine culture ultimately grew Candida for which she was started on Diflucan IV.  She was also found to have markedly elevated alk-phos, AST, and ALT.  Hepatology was consulted and felt acute liver injury related to infection versus medication, and not underlying liver disease. And ammonia was checked and was elevated, but hepatology recommended against treatment as they felt patient did not have hepatic encephalopathy.  Right upper quadrant ultrasound showed biliary sludge and gallbladder thickening but was negative for acute cholecystitis.  A subsequent HIDA  scan was not consistent with acute cholecystitis; no surgical intervention needed per General surgery evaluation.  Patient's mental status improved in the ICU and is now on nasal cannula oxygen and BiPAP at night.  Medicine initially consulted for assistance with management and noted patient to be markedly fluid overloaded, estimated to be approximately net positive 16 L with this admission.  She had elevated CVP and TTE was consistent volume overload as well as her BNP of > 4900.  She was started on IV diuresis with Lasix with improvement in urine output.  She is to be stepped down to Hospital Medicine for further management and ultimately skilled nursing facility placement.   3/9 - Transfer back to MICU for SEptic Shock requiring vasopressor support - etiology is RLL Pneumonia noted on CT Abdomen/pelvis, no new intraabdominal source of infection. She improved and was weaned back down to home  2L nasal cannula. Respiratory cultures grew Resistant Acinetobacter and MRSA so ID was consulted for antibiotic recommendations. ID recommended 2 additional weeks of Bactrim for necrotizing pneumonia and f/u with ID in 2 weeks.  Initially patient and family wanted to go to [facility] but now interested in [facility]. C with wound care was set up. Her diet was advanced by SLP and she tolerated well. Tube feeds were held on 3/15/19. If her PO intake remains adequate, she will f/u with surgery for PEG tube removal.

## 2019-01-24 NOTE — ASSESSMENT & PLAN NOTE
- dietary consulted and appreciate recs; may require temporary TPN  - albumin 2.3 (chronic); will check prealbumin

## 2019-01-24 NOTE — PROGRESS NOTES
"[facility] Hospital Medicine  Progress Note    Patient Name: Sheryl Martines  MRN: [id number]  Patient Class: IP- Inpatient   Admission Date: 1/23/2019  Length of Stay: 0 days  Attending Physician: Monster Laurent MD  Primary Care Provider: Primary Doctor [facility] Medicine Team: [facility] HOSP MED E Rosemary Jack PA-C    Subjective:     Principal Problem:Intractable nausea and vomiting    HPI:  Sheryl Martines is a 64 yo female with medical history of asthma, COPD, CAD, h/o T2DM, hypertension, CVA 2012 with R sided deficits, HFpEF, severe debility, and lap converted to open appendectomy 8/2018 c/b C diff, FTT, and a round infection presents to the ED for acute on chronic increased generalized abdominal pain with associated NBNB emesis, nausea and decreased appetite.   Abd pain is normally 5/10 but today is 7/10.  Pain is described as a"ache" and is non-radiating, non-exertional and unrelated to PO intake. These symptoms are waxing and waning since 8/2018 and usually takes zofran for sxs.  Pt states she has had decreased appetite over the past few weeks, intermittently able to keep down solid food, but usually only able to eat things like chicken broth.    She came to ED today since she was weak described as "feeling bad".  Patient states last bowel movement was prior to admission, no diarrhea.  Patient's family unsure if patient has any recent fevers, but patient denies any f/c/night sweats.  Patient denies any vision changes, dizziness, LH, changes in urine output, dysuria, chest pain or shortness of breath.      In ED, HR elevated to 90-110s, BP stable, 100% on RA and afebrile. No leukocytosis but CRP 78 (ESR 17). T bili 1.7 (BL .7 to 1.1).  Alk phos 400s (BL).  Lipase normal, liver enzymes unremarkable. Ua shows 3 hyaline casts, 2+ protein, 1+ ketones, 1+ bilirubin.  Given reglan, zofran, protonix and duonebs in the ED for sxs.  CT A/P with contrast shows "Posterior right " "hemipelvis rim enhancing fluid collection, suspicious for abscess.  Possible associated retained appendicolith.  Adjacent urinary bladder wall thickening, suspicious for superimposed cystitis."  Gen surgery consulted and recs given. CT shows stability in size compared to CT on 9/30/18 and actually has decreased in size.  No emergent intervention recommended.     Hospital Course:  Patient admitted to medicine for evaluation of nausea & vomiting. General surgery evaluated fluid collection seen on CT, initially felt it was not related to previous surgery. WBC increased to 13 with continued abdominal pain, lactic at 6.8. Patient started on vancomycin, zosyn to cover intraabdominal sepsis. General surgery reevaluated, will take over as primary today.    Interval History: Patient with lactic of 6.8, surgery taking over as primary. Broadened antibiotics to cover intra abdominal sepsis.    Review of Systems   Constitutional: Positive for appetite change, fatigue and unexpected weight change (40 lb wt loss in 5 months). Negative for chills, diaphoresis and fever.   HENT: Negative for congestion and rhinorrhea.    Eyes: Negative for photophobia and visual disturbance.   Respiratory: Negative for cough, chest tightness and shortness of breath.    Cardiovascular: Negative for chest pain, palpitations and leg swelling.   Gastrointestinal: Positive for abdominal pain, nausea and vomiting. Negative for diarrhea.   Genitourinary: Negative for difficulty urinating and dysuria.   Musculoskeletal: Positive for back pain (chronic) and gait problem (multifactorial).   Skin: Negative for rash and wound.   Neurological: Positive for tremors (when cold). Negative for dizziness and headaches.   Psychiatric/Behavioral: Negative for agitation and confusion.     Objective:     Vital Signs (Most Recent):  Temp: 97.2 °F (36.2 °C) (01/24/19 1130)  Pulse: 101 (01/24/19 1242)  Resp: 18 (01/24/19 1130)  BP: (!) 151/115 (01/24/19 1130)  SpO2: (!) 94 " % (01/24/19 1130) Vital Signs (24h Range):  Temp:  [96.8 °F (36 °C)-97.8 °F (36.6 °C)] 97.2 °F (36.2 °C)  Pulse:  [] 101  Resp:  [18-25] 18  SpO2:  [94 %-100 %] 94 %  BP: (113-167)/() 151/115     Weight: 72 kg (158 lb 11.7 oz)  Body mass index is 29.99 kg/m².    Intake/Output Summary (Last 24 hours) at 1/24/2019 1316  Last data filed at 1/24/2019 0700  Gross per 24 hour   Intake 1400 ml   Output --   Net 1400 ml      Physical Exam   Constitutional: She is oriented to person, place, and time. She appears well-developed and well-nourished.   Pleasant elderly female in NAD   Cardiovascular: Normal rate and regular rhythm.   Murmur (systolic) heard.  Pulmonary/Chest: Effort normal and breath sounds normal. No respiratory distress. She has no wheezes.   Diminished lung sounds to bases   Abdominal: Soft. Bowel sounds are normal. She exhibits mass (RLQ, small 1 inch x1 inch). She exhibits no distension. There is tenderness (diffuse, most notable to RLQ and RUQ). There is guarding.   Musculoskeletal: She exhibits no edema or tenderness.   R sided hemiparesis  RLE ttp proximal and distal to R knee  Unable to lift legs off bed   Neurological: She is alert and oriented to person, place, and time.   Appropriate, oriented and follows commands   Skin: Skin is warm and dry. No rash noted.   Psychiatric: Judgment and thought content normal. Her affect is blunt. Her speech is delayed. Her speech is not rapid and/or pressured and not tangential. She is slowed.   Nursing note and vitals reviewed.      Significant Labs:   BMP:   Recent Labs   Lab 01/24/19  0522   GLU 52*      K 3.5      CO2 21*   BUN 5*   CREATININE 0.8   CALCIUM 8.8   MG 1.0*     CBC:   Recent Labs   Lab 01/23/19  1338 01/23/19  1346 01/24/19  0522   WBC 10.24  --  13.42*   HGB 11.4*  --  9.4*   HCT 35.2* 38 28.8*     --  211     Lactic Acid:   Recent Labs   Lab 01/24/19  0833 01/24/19  1148   LACTATE 6.8* 5.6*     All pertinent labs  within the past 24 hours have been reviewed.    Significant Imaging: I have reviewed all pertinent imaging results/findings within the past 24 hours.    Assessment/Plan:      * Intractable nausea and vomiting    Generalized abdominal pain  Dehydration  Severe sepsis  - acute on chronic issue with evidence of dehydration on admission  - sxs c/b recurrent RLQ abscess, but no evidence of infection on admission and do not suspect sole cause of pain  - CT A/P with contrast shows an irregular shaped rim enhancing fluid collection measuring at least 4.0 x 3.0 cm of the right hemipelvis at the site of prior appendectomy.   - general surgery consulted   - fluid collection stable compared to recent CT scans and no evidence of systemic illness   - lactic acid 6.8, with tachycardia and W of 13 (severe sepsis)   - fluid collection will be drained by general surgery, taking over as primary   - start vanc and zosyn   - bolus 1L, recheck lactate 5.6  - lipase WNL, t-bili elevated to 1.7 (fluctuates 0.8-1.4), alk phos stable- check CMP daily  - protonix BID, start carafate  - schedule zofran and PRN antiemetics. If no improvement in sxs, consider consulting GI, but symptoms improved since admission.  - CLD- ADAT  - pain control, mIVF  - may need repeat EGD     Debility    Mostly wheelchair bound, but able to work with [facility] using walker  - PT/OT consult  - fall precautions     Hypokalemia    Hypomagnesia  From GI losses- replaced in the ED  Mag 1.0, give 2g IV x 2     Moderate malnutrition    - dietary consulted and appreciate recs; may require temporary TPN  - albumin 2.3 (chronic); will check prealbumin     Essential hypertension    - c/w home meds     (HFpEF) heart failure with preserved ejection fraction    Last echo 8/2018 with no WMAs, grade 1DD, EF 60  - hypovolemic on exam  - no longer uses lasix; 100% on RA  - strict I/O, daily weights     Moderate asthma without complication    - c/w daily breo, theophylline, PRN  duonebs     CAD (coronary artery disease)    - c/w ASA, plavix, statin  - no anginal equivalent       VTE Risk Mitigation (From admission, onward)        Ordered     IP VTE LOW RISK PATIENT  Once      01/24/19 0027     Place sequential compression device  Until discontinued      01/24/19 0027     Place MARGIE hose  Until discontinued      01/24/19 0027              GAUTAM HernandezC  Department of Hospital Medicine   [facility]

## 2019-01-24 NOTE — CARE UPDATE
RN Proactive Rounding Note  Time of Visit: 1215    Admit Date: 2019  LOS: 0  Code Status: Prior   Date of Visit: 2019  : 1955  Age: 63 y.o.  Sex: female  Race: Black or   Bed: 70/[facility] A:   MRN: [id number]  Was the patient discharged from an ICU this admission? yes   Was the patient discharged from a PACU within last 24 hours?  no  Did the patient receive conscious sedation/general anesthesia in last 24 hours?  no  Was the patient in the ED within the past 24 hours?  yes  Was the patient started on NIPPV within the past 24 hours?  no  Attending Physician: Monster Laurent MD  Primary Service: [facility] E      ASSESSMENT:     Abnormal Vital Signs: Elevated lactic acid  Clinical Issues: Lactic Acid     INTERVENTIONS/ RECOMMENDATIONS:     Upon arriving to bedside, pt noted to be AAOx4 no complaints of SOB or dizziness. Fluids and antibiotics initiated by primary MD. Repeat lactic acid drawn decreased from 6.8 to 5.6. Discussed POC with bedside RN, no concerns noted at this time. [facility].    Discussed plan of care with RNGerri [id number].    PHYSICIAN ESCALATION:     Yes/No  no    Orders received and case discussed with N/A .    Disposition: Remain in room 7073.    FOLLOW-UP/CONTINGENCY:     Call back the Rapid Response Nurse at x [phone] for additional questions or concerns.

## 2019-01-24 NOTE — HPI
"64 yo W with a history of HTN, COPD on home oxygen, HFpEF, IDDMII, CVA on plavix, HTN, debility after fall and broken hip, and PE who presents to the ED with a several month history of nausea, vomiting, inability to tolerate a diet and weight loss. She has had multiple admissions in the past few months for different ailments. She presents today with continued nausea, vomiting and abdominal pain that is mostly worse in the RLQ. During the examination, she states her pain was all over her abdomen because she was retching so much. She states that she has lost close to 40lbs since her surgery and that she only able to keep broth down. She had a lap converted to open appendectomy back in August 2018 that was complicated by a wound infection, C diff and failure to thrive. This seems to persists. She is now wheelchair bound (per her) and apparently lives in [place] but is here in [place] with her daughter.    She had a CT scan in the ED which revealed an irregular shaped rim enhancing fluid collection measuring at least 4.0 x 3.0 cm ight hemipelvis at the site of prior appendectomy. Surgery was consulted for further recommendations. She was also noted to have a "knot" at the RLQ incision site that is also tender.  "

## 2019-01-24 NOTE — SUBJECTIVE & OBJECTIVE
Past Medical History:   Diagnosis Date    Abnormal LFTs 12/14/2018    Asthma     Closed compression fracture of fourth lumbar vertebra     COPD (chronic obstructive pulmonary disease)     Coronary artery disease     Diabetes mellitus     Fall 10/4/2018    Glaucoma     High cholesterol     Hypertension     Infected incision 9/20/2018    Iritis     Pulmonary embolus     S/P appendectomy 9/11/2018    Stroke     rt sided weakness.       Past Surgical History:   Procedure Laterality Date    ABDOMINAL SURGERY      APPENDECTOMY N/A 8/26/2018    Performed [facility]rez [facility] at [facility]    APPENDECTOMY, LAPAROSCOPIC---CONVERTED TO OPEN APPENDECTOMY @0950 N/A 8/26/2018    Performed by [facility]adine [facility]rez [facility] at [facility] OR    BACK SURGERY      stimulator    CATARACT EXTRACTION      HYSTERECTOMY         Review of patient's allergies indicates:   Allergen Reactions    Ace inhibitors Swelling    Carvedilol      Other reaction(s): Other (See Comments)  blister    Hydralazine analogues     Metformin Nausea And Vomiting    Tetracycline Itching    Tetracyclines Swelling    Travatan (with benzalkonium) [travoprost (benzalkonium)]     Travoprost Itching     Other reaction(s): Other (See Comments)  Blurry vision       No current facility-administered medications on file prior to encounter.      Current Outpatient Medications on File Prior to Encounter   Medication Sig    acetaminophen (TYLENOL) 500 MG tablet Take 1,000 mg by mouth 2 (two) times daily as needed for Pain.    albuterol (PROVENTIL/VENTOLIN HFA) 90 mcg/actuation inhaler Inhale 2 puffs into the lungs every 6 (six) hours as needed for Wheezing or Shortness of Breath. Rescue    albuterol-ipratropium (DUO-NEB) 2.5 mg-0.5 mg/3 mL nebulizer solution Take 3 mLs by nebulization every 4 (four) hours as needed for Wheezing or Shortness of Breath. Rescue     aspirin (ECOTRIN) 81 MG EC tablet Take 1 tablet (81 mg total) by mouth once daily.     baclofen (LIORESAL) 10 MG tablet Take 10 mg by mouth 2 (two) times daily as needed (MUSCLE SPASMS).    cefdinir (OMNICEF) 300 MG capsule TK ONE C PO  BID FOR 7 DAYS    cephALEXin (KEFLEX) 750 MG capsule TK ONE C PO TID FOR 5 DAYS    ciprofloxacin HCl (CIPRO) 250 MG tablet Take 1 tablet (250 mg total) by mouth every 12 (twelve) hours. Starting 12/15 evening    clopidogrel (PLAVIX) 75 mg tablet Take 75 mg by mouth once daily.    diclofenac sodium (VOLTAREN) 1 % Gel Apply 2 g topically daily as needed (PAIN).    diltiaZEM (CARDIZEM CD) 180 MG 24 hr capsule Take 180 mg by mouth once daily.    DULoxetine (CYMBALTA) 60 MG capsule Take 60 mg by mouth once daily.    fluticasone-vilanterol (BREO ELLIPTA) 100-25 mcg/dose diskus inhaler Inhale 1 puff into the lungs once daily. Controller    gabapentin (NEURONTIN) 300 MG capsule Take 300 mg by mouth once daily.    Lactobacillus rhamnosus-fiber 2.5 billion cell-3.5 gram PwPk Take 1 capsule by mouth.    lansoprazole (PREVACID) 30 MG capsule Take 30 mg by mouth once daily.    losartan (COZAAR) 100 MG tablet Take 100 mg by mouth once daily.     ondansetron (ZOFRAN) 4 MG tablet Take 1 tablet (4 mg total) by mouth every 6 (six) hours as needed.    ondansetron (ZOFRAN) 4 MG tablet Take 4-8 mg by mouth.    potassium chloride SA (K-DUR,KLOR-CON) 10 MEQ tablet Take 10 mEq by mouth.    predniSONE (DELTASONE) 10 MG tablet Take 4 tablets (40 mg) on 12/16, then take 1 tablet (10 mg) daily    pyridoxine, vitamin B6, (VITAMIN B-6) 50 MG Tab Take 1 tablet (50 mg total) by mouth once daily.    simvastatin (ZOCOR) 40 MG tablet Take 40 mg by mouth.    theophylline (THEODUR) 300 mg 24 hr capsule Take 300 mg by mouth once daily.    traMADol (ULTRAM) 50 mg tablet TK 1 T PO BID PRN    [DISCONTINUED] baclofen (LIORESAL) 10 MG tablet Take 10 mg by mouth.    [DISCONTINUED] furosemide (LASIX) 40 MG tablet Take 40 mg by mouth once daily.     [DISCONTINUED] HYDROcodone-acetaminophen  (NORCO) 5-325 mg per tablet Take 1 tablet by mouth every 4 to 6 hours as needed.    [DISCONTINUED] prazosin (MINIPRESS) 5 MG capsule TK 1 C PO BID     Family History     Problem Relation (Age of Onset)    Cancer Father    Diabetes Brother    Lupus Sister    Multiple sclerosis Mother        Tobacco Use    Smoking status: Never Smoker    Smokeless tobacco: Never Used   Substance and Sexual Activity    Alcohol use: No     Frequency: Never    Drug use: No    Sexual activity: No     Partners: Male     Review of Systems   Constitutional: Positive for appetite change, fatigue and unexpected weight change (40 lb wt loss in 5 months). Negative for chills, diaphoresis and fever.   HENT: Negative for congestion and rhinorrhea.    Eyes: Negative for photophobia and visual disturbance.   Respiratory: Negative for cough, chest tightness and shortness of breath.    Cardiovascular: Negative for chest pain, palpitations and leg swelling.   Gastrointestinal: Positive for abdominal pain, nausea and vomiting. Negative for diarrhea.   Genitourinary: Negative for difficulty urinating and dysuria.   Musculoskeletal: Positive for back pain (chronic) and gait problem (multifactorial).   Skin: Negative for rash and wound.   Neurological: Positive for tremors (when cold). Negative for dizziness and headaches.   Psychiatric/Behavioral: Negative for agitation and confusion.     Objective:     Vital Signs (Most Recent):  Temp: 96.8 °F (36 °C) (01/24/19 0005)  Pulse: 108 (01/23/19 2250)  Resp: 20 (01/23/19 2239)  BP: (!) 143/76 (01/23/19 2250)  SpO2: 100 % (01/23/19 2250) Vital Signs (24h Range):  Temp:  [96.8 °F (36 °C)-97.8 °F (36.6 °C)] 96.8 °F (36 °C)  Pulse:  [] 108  Resp:  [18-25] 20  SpO2:  [95 %-100 %] 100 %  BP: (132-167)/(70-90) 143/76     Weight: 72 kg (158 lb 11.7 oz)  Body mass index is 29.99 kg/m².    Physical Exam   Constitutional: She is oriented to person, place, and time. She appears well-developed and  well-nourished.   Pleasant elderly female in NAD   HENT:   Head: Normocephalic and atraumatic.   Eyes: EOM are normal. Pupils are equal, round, and reactive to light.   Neck: Normal range of motion. Neck supple.   Cardiovascular: Normal rate and regular rhythm.   Murmur (systolic) heard.  Pulmonary/Chest: Effort normal and breath sounds normal. No respiratory distress. She has no wheezes.   Diminished lung sounds to bases   Abdominal: Soft. Bowel sounds are normal. She exhibits mass (RLQ, small 1 inch x1 inch). She exhibits no distension. There is tenderness (diffuse, most notable to RLQ and RUQ). There is guarding.   Musculoskeletal: Normal range of motion.   R sided hemiparesis  RLE ttp proximal and distal to R knee  Unable to lift legs off bed   Neurological: She is alert and oriented to person, place, and time.   Appropriate, oriented and follows commands   Skin: Skin is warm and dry. No rash noted. No pallor.   Psychiatric: Judgment and thought content normal. Her affect is blunt. Her speech is delayed. Her speech is not rapid and/or pressured and not tangential. She is slowed.   Nursing note and vitals reviewed.        CRANIAL NERVES     CN III, IV, VI   Pupils are equal, round, and reactive to light.  Extraocular motions are normal.        Significant Labs:   CBC:   Recent Labs   Lab 01/23/19  1338 01/23/19  1346   WBC 10.24  --    HGB 11.4*  --    HCT 35.2* 38     --      CMP:   Recent Labs   Lab 01/23/19  1338   *   K 3.0*   CL 95   CO2 24   GLU 81   BUN 6*   CREATININE 0.8   CALCIUM 9.4   PROT 5.5*   ALBUMIN 2.3*   BILITOT 1.7*   ALKPHOS 417*   AST 55*   ALT 16   ANIONGAP 16   EGFRNONAA >60.0     Urine Studies:   Recent Labs   Lab 01/23/19  1515   COLORU Amanda   APPEARANCEUA Hazy*   PHUR 6.0   SPECGRAV 1.020   PROTEINUA 2+*   GLUCUA Negative   KETONESU 1+*   BILIRUBINUA 1+*   OCCULTUA Negative   NITRITE Negative   LEUKOCYTESUR Trace*   RBCUA 2   WBCUA 9*   BACTERIA Occasional   SQUAMEPITHEL 23    HYALINECASTS 3*       Significant Imaging: I have reviewed all pertinent imaging results/findings within the past 24 hours.     Ct Abdomen Pelvis With Contrast    Result Date: 1/23/2019  EXAMINATION: CT ABDOMEN PELVIS WITH CONTRAST CLINICAL HISTORY: Nausea, vomiting, diarrhea; TECHNIQUE: Low dose axial images, sagittal and coronal reformations were obtained from the lung bases to the pubic symphysis following the IV administration of 75 mL of Omnipaque 350 .  Oral contrast was not given. COMPARISON: CT and ultrasound exams dating back to December 12, 2018 FINDINGS: Visualized lower chest is unremarkable. The liver demonstrates diffuse steatosis, unchanged. The gallbladder, pancreas, adrenals, and spleen are unremarkable. Scattered colonic diverticula.  No bowel wall thickening or dilation. No adenopathy. Unchanged small right renal cyst.  The kidneys and ureters are otherwise unremarkable. The urinary bladder demonstrates circumferential wall thickening. Evaluation of the pelvis is limited by beam hardening artifacts caused by left hip arthroplasty hardware. In the right hemipelvis at the site of prior appendectomy, there is an irregular shaped rim enhancing fluid collection measuring at least 4.0 x 3.0 cm.  Associated surrounding fat stranding and inflammatory change.  This fluid collection abuts the superior right aspect of the urinary bladder dome.  Possible associated retained appendicolith, to the right of the fluid collection. Hysterectomy. Body wall soft tissues are unremarkable. No acute bone abnormality.  Unchanged multilevel lumbar spine compression deformities.     Posterior right hemipelvis rim enhancing fluid collection, suspicious for abscess.  Possible associated retained appendicolith. Adjacent urinary bladder wall thickening, suspicious for superimposed cystitis. This report was flagged in Epic as abnormal.

## 2019-01-24 NOTE — ASSESSMENT & PLAN NOTE
Generalized abdominal pain  Dehydration  - acute on chronic issue with evidence of dehydration on admission  - sxs c/b recurrent RLQ abscess, but no evidence of infection on admission and do not suspect sole cause of pain  - CT A/P with contrast shows an irregular shaped rim enhancing fluid collection measuring at least 4.0 x 3.0 cm of the right hemipelvis at the site of prior appendectomy.   - general surgery consulted   - fluid collection stable compared to recent CT scans and no evidence of systemic illness   - no acute intervention at this time, but will talk with staff as appendicolith likely cause of recurrent fluid collection   - EGD done at an OSH which revealed chronic gastritis and LA grade A esophagitis which is likely related to her persistent emesis.    - lipase WNL, t-bili elevated to 1.7 (fluctuates 0.8-1.4), alk phos stable- check CMP daily  - protonix BID, start carafate  - schedule zofran and PRN antiemetics. If no improvement in sxs, consider consulting GI, but symptoms improved since admission.  - CLD- ADAT  - pain control, mIVF  - may need repeat EGD  - will need GI f/u at discharge.  Lives with daughter in town; recently moved permanently from [place].

## 2019-01-24 NOTE — ASSESSMENT & PLAN NOTE
Mostly wheelchair bound, but [facility]  - PT/OT consult  - fall precautions

## 2019-01-24 NOTE — ASSESSMENT & PLAN NOTE
Mostly wheelchair bound, but [facility]  - PT/OT consult  - fall precautions

## 2019-01-24 NOTE — PROVIDER PROGRESS NOTES - EMERGENCY DEPT.
I have assumed care for this patient from   DEMARCUS Rollins NP  at 9 PM . I have reviewed case, and have seen patient. I have read the chart and discussed care with the previous attending. I agree with the plan as previously determined. Since assuming care, the patient's course includes:    patient w/ n/v, weight loss x 40 lb and abd pain since she had appendectomy summer of 2018. Unable to tolerate PO, reports since the surgery she has been in and out of the hospital    frail, reports still feeling nauseated  abd soft + BS diffuse ttp more so in the rlq no peritoneal signs  Diffuse wheezing    gen surg: unlikely that the intraabd collection is the etiology of her n/v, the collection is similar in appearance to previous CT, no fever/WBC   ? Gastritis/esophagitis. + dehydrated (tachy, ketones in the urine), weight loss, gen surg recs for admission to the hospital and they will follow the patient      Reglan, IVF. Duoneb, pantoprazole    Placed in observation

## 2019-01-24 NOTE — PLAN OF CARE
Problem: Adult Inpatient Plan of Care  Goal: Plan of Care Review  Outcome: Ongoing (interventions implemented as appropriate)  POC reviewed with pt & daughter. AAO x4.  Telemetry monitoring. Pt started on zosyn and vanc for increased lactic acid. Exploratory laparotomy pending. Pt remained free from falls. Questions and concerns addressed. Pt progressing towards goals. Will continue to monitor. See flow sheets for full assessment and VS

## 2019-01-24 NOTE — H&P
"[facility] Medicine  History & Physical    Patient Name: Sheryl Martines  MRN: [id number]  Admission Date: 1/23/2019  Attending Physician: Cheli Faye MD   Primary Care Provider: [facility] Medicine Team: [facility] E Arsenio Gonzalez PA-C     Patient information was obtained from patient, past medical records and ER records.     Subjective:     Principal Problem:Intractable nausea and vomiting    Chief Complaint:   Chief Complaint   Patient presents with    Abdominal Pain     n/v and abdominal pain; wheelchair bound at home; "weaker than usual".          HPI: Sheryl Martines is a 64 yo female with medical history of asthma, COPD, CAD, h/o T2DM, hypertension, CVA 2012 with R sided deficits, HFpEF, severe debility, and lap converted to open appendectomy 8/2018 c/b C diff, FTT, and a round infection presents to the ED for acute on chronic increased generalized abdominal pain with associated NBNB emesis, nausea and decreased appetite.   Abd pain is normally 5/10 but today is 7/10.  Pain is described as a"ache" and is non-radiating, non-exertional and unrelated to PO intake. These symptoms are waxing and waning since 8/2018 and usually takes zofran for sxs.  Pt states she has had decreased appetite over the past few weeks, intermittently able to keep down solid food, but usually only able to eat things like chicken broth.    She came to ED today since she was weak described as "feeling bad".  Patient states last bowel movement was prior to admission, no diarrhea.  Patient's family unsure if patient has any recent fevers, but patient denies any f/c/night sweats.  Patient denies any vision changes, dizziness, LH, changes in urine output, dysuria, chest pain or shortness of breath.      In ED, HR elevated to 90-110s, BP stable, 100% on RA and afebrile. No leukocytosis but CRP 78 (ESR 17). T bili 1.7 (BL .7 to 1.1).  Alk phos 400s (BL).  Lipase normal, liver enzymes " "unremarkable. Ua shows 3 hyaline casts, 2+ protein, 1+ ketones, 1+ bilirubin.  Given reglan, zofran, protonix and duonebs in the ED for sxs.  CT A/P with contrast shows "Posterior right hemipelvis rim enhancing fluid collection, suspicious for abscess.  Possible associated retained appendicolith.  Adjacent urinary bladder wall thickening, suspicious for superimposed cystitis."  Gen surgery consulted and recs given. CT shows stability in size compared to CT on 9/30/18 and actually has decreased in size.  No emergent intervention recommended.     Past Medical History:   Diagnosis Date    Abnormal LFTs 12/14/2018    Asthma     Closed compression fracture of fourth lumbar vertebra     COPD (chronic obstructive pulmonary disease)     Coronary artery disease     Diabetes mellitus     Fall 10/4/2018    Glaucoma     High cholesterol     Hypertension     Infected incision 9/20/2018    Iritis     Pulmonary embolus     S/P appendectomy 9/11/2018    Stroke     rt sided weakness.       Past Surgical History:   Procedure Laterality Date    ABDOMINAL SURGERY      APPENDECTOMY N/A 8/26/2018    [facility]    APPENDECTOMY, LAPAROSCOPIC---CONVERTED TO OPEN APPENDECTOMY @0950 N/A 8/26/2018    [facility] at [facility] OR    BACK SURGERY      stimulator    CATARACT EXTRACTION      HYSTERECTOMY         Review of patient's allergies indicates:   Allergen Reactions    Ace inhibitors Swelling    Carvedilol      Other reaction(s): Other (See Comments)  blister    Hydralazine analogues     Metformin Nausea And Vomiting    Tetracycline Itching    Tetracyclines Swelling    Travatan (with benzalkonium) [travoprost (benzalkonium)]     Travoprost Itching     Other reaction(s): Other (See Comments)  Blurry vision       No current facility-administered medications on file prior to encounter.      Current Outpatient Medications on File Prior to Encounter   Medication " Sig    acetaminophen (TYLENOL) 500 MG tablet Take 1,000 mg by mouth 2 (two) times daily as needed for Pain.    albuterol (PROVENTIL/VENTOLIN HFA) 90 mcg/actuation inhaler Inhale 2 puffs into the lungs every 6 (six) hours as needed for Wheezing or Shortness of Breath. Rescue    albuterol-ipratropium (DUO-NEB) 2.5 mg-0.5 mg/3 mL nebulizer solution Take 3 mLs by nebulization every 4 (four) hours as needed for Wheezing or Shortness of Breath. Rescue     aspirin (ECOTRIN) 81 MG EC tablet Take 1 tablet (81 mg total) by mouth once daily.    baclofen (LIORESAL) 10 MG tablet Take 10 mg by mouth 2 (two) times daily as needed (MUSCLE SPASMS).    cefdinir (OMNICEF) 300 MG capsule TK ONE C PO  BID FOR 7 DAYS    cephALEXin (KEFLEX) 750 MG capsule TK ONE C PO TID FOR 5 DAYS    ciprofloxacin HCl (CIPRO) 250 MG tablet Take 1 tablet (250 mg total) by mouth every 12 (twelve) hours. Starting 12/15 evening    clopidogrel (PLAVIX) 75 mg tablet Take 75 mg by mouth once daily.    diclofenac sodium (VOLTAREN) 1 % Gel Apply 2 g topically daily as needed (PAIN).    diltiaZEM (CARDIZEM CD) 180 MG 24 hr capsule Take 180 mg by mouth once daily.    DULoxetine (CYMBALTA) 60 MG capsule Take 60 mg by mouth once daily.    fluticasone-vilanterol (BREO ELLIPTA) 100-25 mcg/dose diskus inhaler Inhale 1 puff into the lungs once daily. Controller    gabapentin (NEURONTIN) 300 MG capsule Take 300 mg by mouth once daily.    Lactobacillus rhamnosus-fiber 2.5 billion cell-3.5 gram PwPk Take 1 capsule by mouth.    lansoprazole (PREVACID) 30 MG capsule Take 30 mg by mouth once daily.    losartan (COZAAR) 100 MG tablet Take 100 mg by mouth once daily.     ondansetron (ZOFRAN) 4 MG tablet Take 1 tablet (4 mg total) by mouth every 6 (six) hours as needed.    ondansetron (ZOFRAN) 4 MG tablet Take 4-8 mg by mouth.    potassium chloride SA (K-DUR,KLOR-CON) 10 MEQ tablet Take 10 mEq by mouth.    predniSONE (DELTASONE) 10 MG tablet Take 4 tablets  (40 mg) on 12/16, then take 1 tablet (10 mg) daily    pyridoxine, vitamin B6, (VITAMIN B-6) 50 MG Tab Take 1 tablet (50 mg total) by mouth once daily.    simvastatin (ZOCOR) 40 MG tablet Take 40 mg by mouth.    theophylline (THEODUR) 300 mg 24 hr capsule Take 300 mg by mouth once daily.    traMADol (ULTRAM) 50 mg tablet TK 1 T PO BID PRN    [DISCONTINUED] baclofen (LIORESAL) 10 MG tablet Take 10 mg by mouth.    [DISCONTINUED] furosemide (LASIX) 40 MG tablet Take 40 mg by mouth once daily.     [DISCONTINUED] HYDROcodone-acetaminophen (NORCO) 5-325 mg per tablet Take 1 tablet by mouth every 4 to 6 hours as needed.    [DISCONTINUED] prazosin (MINIPRESS) 5 MG capsule TK 1 C PO BID     Family History     Problem Relation (Age of Onset)    Cancer Father    Diabetes Brother    Lupus Sister    Multiple sclerosis Mother        Tobacco Use    Smoking status: Never Smoker    Smokeless tobacco: Never Used   Substance and Sexual Activity    Alcohol use: No     Frequency: Never    Drug use: No    Sexual activity: No     Partners: Male     Review of Systems   Constitutional: Positive for appetite change, fatigue and unexpected weight change (40 lb wt loss in 5 months). Negative for chills, diaphoresis and fever.   HENT: Negative for congestion and rhinorrhea.    Eyes: Negative for photophobia and visual disturbance.   Respiratory: Negative for cough, chest tightness and shortness of breath.    Cardiovascular: Negative for chest pain, palpitations and leg swelling.   Gastrointestinal: Positive for abdominal pain, nausea and vomiting. Negative for diarrhea.   Genitourinary: Negative for difficulty urinating and dysuria.   Musculoskeletal: Positive for back pain (chronic) and gait problem (multifactorial).   Skin: Negative for rash and wound.   Neurological: Positive for tremors (when cold). Negative for dizziness and headaches.   Psychiatric/Behavioral: Negative for agitation and confusion.     Objective:     Vital  Signs (Most Recent):  Temp: 96.8 °F (36 °C) (01/24/19 0005)  Pulse: 108 (01/23/19 2250)  Resp: 20 (01/23/19 2239)  BP: (!) 143/76 (01/23/19 2250)  SpO2: 100 % (01/23/19 2250) Vital Signs (24h Range):  Temp:  [96.8 °F (36 °C)-97.8 °F (36.6 °C)] 96.8 °F (36 °C)  Pulse:  [] 108  Resp:  [18-25] 20  SpO2:  [95 %-100 %] 100 %  BP: (132-167)/(70-90) 143/76     Weight: 72 kg (158 lb 11.7 oz)  Body mass index is 29.99 kg/m².    Physical Exam   Constitutional: She is oriented to person, place, and time. She appears well-developed and well-nourished.   Pleasant elderly female in NAD   HENT:   Head: Normocephalic and atraumatic.   Eyes: EOM are normal. Pupils are equal, round, and reactive to light.   Neck: Normal range of motion. Neck supple.   Cardiovascular: Normal rate and regular rhythm.   Murmur (systolic) heard.  Pulmonary/Chest: Effort normal and breath sounds normal. No respiratory distress. She has no wheezes.   Diminished lung sounds to bases   Abdominal: Soft. Bowel sounds are normal. She exhibits mass (RLQ, small 1 inch x1 inch). She exhibits no distension. There is tenderness (diffuse, most notable to RLQ and RUQ). There is guarding.   Musculoskeletal: Normal range of motion.   R sided hemiparesis  RLE ttp proximal and distal to R knee  Unable to lift legs off bed   Neurological: She is alert and oriented to person, place, and time.   Appropriate, oriented and follows commands   Skin: Skin is warm and dry. No rash noted. No pallor.   Psychiatric: Judgment and thought content normal. Her affect is blunt. Her speech is delayed. Her speech is not rapid and/or pressured and not tangential. She is slowed.   Nursing note and vitals reviewed.        CRANIAL NERVES     CN III, IV, VI   Pupils are equal, round, and reactive to light.  Extraocular motions are normal.        Significant Labs:   CBC:   Recent Labs   Lab 01/23/19  1338 01/23/19  1346   WBC 10.24  --    HGB 11.4*  --    HCT 35.2* 38     --       CMP:   Recent Labs   Lab 01/23/19  1338   *   K 3.0*   CL 95   CO2 24   GLU 81   BUN 6*   CREATININE 0.8   CALCIUM 9.4   PROT 5.5*   ALBUMIN 2.3*   BILITOT 1.7*   ALKPHOS 417*   AST 55*   ALT 16   ANIONGAP 16   EGFRNONAA >60.0     Urine Studies:   Recent Labs   Lab 01/23/19  1515   COLORU Amanda   APPEARANCEUA Hazy*   PHUR 6.0   SPECGRAV 1.020   PROTEINUA 2+*   GLUCUA Negative   KETONESU 1+*   BILIRUBINUA 1+*   OCCULTUA Negative   NITRITE Negative   LEUKOCYTESUR Trace*   RBCUA 2   WBCUA 9*   BACTERIA Occasional   SQUAMEPITHEL 23   HYALINECASTS 3*       Significant Imaging: I have reviewed all pertinent imaging results/findings within the past 24 hours.     Ct Abdomen Pelvis With Contrast    Result Date: 1/23/2019  EXAMINATION: CT ABDOMEN PELVIS WITH CONTRAST CLINICAL HISTORY: Nausea, vomiting, diarrhea; TECHNIQUE: Low dose axial images, sagittal and coronal reformations were obtained from the lung bases to the pubic symphysis following the IV administration of 75 mL of Omnipaque 350 .  Oral contrast was not given. COMPARISON: CT and ultrasound exams dating back to December 12, 2018 FINDINGS: Visualized lower chest is unremarkable. The liver demonstrates diffuse steatosis, unchanged. The gallbladder, pancreas, adrenals, and spleen are unremarkable. Scattered colonic diverticula.  No bowel wall thickening or dilation. No adenopathy. Unchanged small right renal cyst.  The kidneys and ureters are otherwise unremarkable. The urinary bladder demonstrates circumferential wall thickening. Evaluation of the pelvis is limited by beam hardening artifacts caused by left hip arthroplasty hardware. In the right hemipelvis at the site of prior appendectomy, there is an irregular shaped rim enhancing fluid collection measuring at least 4.0 x 3.0 cm.  Associated surrounding fat stranding and inflammatory change.  This fluid collection abuts the superior right aspect of the urinary bladder dome.  Possible associated retained  appendicolith, to the right of the fluid collection. Hysterectomy. Body wall soft tissues are unremarkable. No acute bone abnormality.  Unchanged multilevel lumbar spine compression deformities.     Posterior right hemipelvis rim enhancing fluid collection, suspicious for abscess.  Possible associated retained appendicolith. Adjacent urinary bladder wall thickening, suspicious for superimposed cystitis. This report was flagged in Epic as abnormal.     Assessment/Plan:     * Intractable nausea and vomiting    Generalized abdominal pain  Dehydration  - acute on chronic issue with evidence of dehydration on admission  - sxs c/b recurrent RLQ abscess, but no evidence of infection on admission and do not suspect sole cause of pain  - CT A/P with contrast shows an irregular shaped rim enhancing fluid collection measuring at least 4.0 x 3.0 cm of the right hemipelvis at the site of prior appendectomy.   - general surgery consulted   - fluid collection stable compared to recent CT scans and no evidence of systemic illness   - no acute intervention at this time, but will talk with staff as appendicolith likely cause of recurrent fluid collection   - EGD done at an OSH which revealed chronic gastritis and LA grade A esophagitis which is likely related to her persistent emesis.    - lipase WNL, t-bili elevated to 1.7 (fluctuates 0.8-1.4), alk phos stable- check CMP daily  - protonix BID, start carafate  - schedule zofran and PRN antiemetics. If no improvement in sxs, consider consulting GI, but symptoms improved since admission.  - CLD- ADAT  - pain control, mIVF  - may need repeat EGD  - will need GI f/u at discharge.  Lives with daughter in town; recently moved permanently from [place].     (HFpEF) heart failure with preserved ejection fraction    Last echo 8/2018 with no WMAs, grade 1DD, EF 60  - hypovolemic on exam  - no longer uses lasix; 100% on RA  - strict I/O, daily weights     Debility    Mostly wheelchair bound, but  able to work with [facility] using walker  - PT/OT consult  - fall precautions     Moderate malnutrition    - dietary consulted and appreciate recs; may require temporary TPN  - albumin 2.3 (chronic); will check prealbumin     Moderate asthma without complication    - c/w daily breo, theophylline, PRN duonebs     Hypokalemia    From GI losses- replaced in the ED     Essential hypertension    - c/w home meds     CAD (coronary artery disease)    - c/w ASA, plavix, statin  - no anginal equivalent       VTE Risk Mitigation (From admission, onward)        Ordered     IP VTE LOW RISK PATIENT  Once      01/24/19 0027     Place sequential compression device  Until discontinued      01/24/19 0027     Place MARGIE hose  Until discontinued      01/24/19 0027             Arsenio Gonzalez PACatarchitoC  Department of Hospital Medicine   [facility]

## 2019-01-24 NOTE — ASSESSMENT & PLAN NOTE
-Patient with a long history of nausea and vomiting (unsure of length; during exam there were no family members in room) but she states that her this started after her surgery 4 months ago. She has been admitted multiple times for a variety of issues and per her, has lost at least 40 lbs since this ordeal started. Upon further review of her images, she seems to have had this fluid collection in her right hemipelvis at the site of her appendectomy since her CT scan done 9/30/18. This fluid collection has actually decreased in size following her scans up to now. She is afebrile and has no leukocytosis. I highly doubt that her current symptoms are due to this fluid collection that she has had for months now. She is into exhibiting signs of it being infected either (no fevers, no leukocytosis). She does have a hernia at her incision site in the RLQ which is the most focal point of her tenderness. The hernia only contains fat and that can maybe be addressed at a later day.   -She had EGD done at an OSH which revealed chronic gastritis and LA grade A esophagitis which is likely related to her persistent emesis. Recommend admit to medicine for further work up of unintentional weight loss, nausea and vomiting. Fluid collection is not infected and likely has rim due to chronicity. If it needs to be drained, IR will be best approach but would hold off for now.  -Surgery will follow

## 2019-01-24 NOTE — PROGRESS NOTES
Call received from Marcie to spot check pts BG.   BG=[id number]. Pt asymptomatic.  Administered 12.5 g dextrose PRN order\      1154:  after administration of 12.5g dextrose. Will continue to monitor pt.

## 2019-01-24 NOTE — ANESTHESIA PREPROCEDURE EVALUATION
[facility]  Anesthesia Pre-Operative Evaluation         Patient Name: Sheryl Martines  YOB: 1955  MRN: [id number]    SUBJECTIVE:     Pre-operative evaluation for Procedure(s) (LRB):  LAPAROTOMY, EXPLORATORY (N/A)  Scheduled for 1/25/2019    HPI 01/24/2019:  Sheryl Martines is a 63 y.o. female with hx of asthma, COPD, CAD, DM, HTN, CVA 2012 with R side deficits, HFpEF, severe debility, lap converted to open appendectomy 8/2018 complicated by cdiff, failure to thrive.  Hx of difficult intubation and delayed emergence, CO2 narcosis needing ICU stay.  Hx of PRN O2 at night.  Denies smoking.    Patient was admitted on 1/23/2019 via ED for abd pain, N/V, decreased appetite.  Lactic acid 6.8, with tachycardia and W of 13 (severe sepsis).  CT shows fluid collection in R hemipelvis.  Started on vanc and zosyn.    Patient's daughter asked if this could be done with spinal because she did not like the need for ICU admission last time.  She attributes previous ICU stay to CO2 narcosis.  Discussed with daughter that this is not possible due to the demands of an exploratory laparotomy, such as the need for paralysis.  She will need to be paralyzed and ventilated and given her hx of asthma and COPD she is at risk of CO2 narcosis occurring.  We stated that we will try to avoid it and take precautions but we cannot guarantee that it will not occur again.  Patient's daughter seemed unwilling to accept the possibility of an ICU admission post surgery.    Consent signed with daughter at bedside per patient's request due to her R side stroke deficits.    Patient presents for the above procedure(s).    Previous Airway:   Appendectomy 8/26/2018  DL 7.0 at 21cm grade 1 view with woody 2    Oxygen/Ventilation Requirements:  On room air with SpO2 of 94%       Current LDA:        Port A Cath Single Lumen 01/23/19 1400 right subclavian (Active)   Accessed by: Ana Dockery 1/23/2019  2:42 PM   Dressing  Type Transparent 1/24/2019  7:30 AM   Dressing Status Biopatch in place;Clean;Dry;Intact 1/24/2019  7:30 AM   Dressing Intervention Dressing reinforced 1/24/2019  7:30 AM   Line Status Infusing 1/24/2019  7:30 AM   Flush Performed Yes 1/24/2019  7:30 AM   Type of Needle Garcia 1/24/2019  7:30 AM   Gauge 20 1/24/2019  7:30 AM   Needle Length 1 in 1/24/2019  7:30 AM   Number of days: 1       Current Drips:  None documented.      Patient Active Problem List   Diagnosis    Insulin dependent diabetes mellitus    CAD (coronary artery disease)    Opiate use    CVA, old, hemiparesis    Clostridium difficile infection    Moderate asthma without complication    (HFpEF) heart failure with preserved ejection fraction    Gastroesophageal reflux disease without esophagitis    Essential hypertension    Decubitus ulcer of buttock, stage 2    Moderate malnutrition    Atrial fibrillation    Acute metabolic encephalopathy    Closed fracture of right tibia and fibula    Anemia    Asthma with acute exacerbation    Hypomagnesemia    Exacerbation of asthma    Acute cystitis with hematuria    Seizure    Acute renal failure superimposed on stage 3 chronic kidney disease    Intractable nausea and vomiting    Generalized abdominal pain    Dehydration    Hypokalemia    Debility    Severe sepsis       Review of patient's allergies indicates:   Allergen Reactions    Ace inhibitors Swelling    Carvedilol      Other reaction(s): Other (See Comments)  blister    Hydralazine analogues     Metformin Nausea And Vomiting    Tetracycline Itching    Tetracyclines Swelling    Travatan (with benzalkonium) [travoprost (benzalkonium)]     Travoprost Itching     Other reaction(s): Other (See Comments)  Blurry vision       Outpatient Medications:  No current facility-administered medications on file prior to encounter.      Current Outpatient Medications on File Prior to Encounter   Medication Sig Dispense Refill     acetaminophen (TYLENOL) 500 MG tablet Take 1,000 mg by mouth 2 (two) times daily as needed for Pain.      albuterol (PROVENTIL/VENTOLIN HFA) 90 mcg/actuation inhaler Inhale 2 puffs into the lungs every 6 (six) hours as needed for Wheezing or Shortness of Breath. Rescue      albuterol-ipratropium (DUO-NEB) 2.5 mg-0.5 mg/3 mL nebulizer solution Take 3 mLs by nebulization every 4 (four) hours as needed for Wheezing or Shortness of Breath. Rescue       aspirin (ECOTRIN) 81 MG EC tablet Take 1 tablet (81 mg total) by mouth once daily. 30 tablet 11    baclofen (LIORESAL) 10 MG tablet Take 10 mg by mouth 2 (two) times daily as needed (MUSCLE SPASMS).      cefdinir (OMNICEF) 300 MG capsule TK ONE C PO  BID FOR 7 DAYS  0    cephALEXin (KEFLEX) 750 MG capsule TK ONE C PO TID FOR 5 DAYS  0    ciprofloxacin HCl (CIPRO) 250 MG tablet Take 1 tablet (250 mg total) by mouth every 12 (twelve) hours. Starting 12/15 evening 5 tablet 0    clopidogrel (PLAVIX) 75 mg tablet Take 75 mg by mouth once daily.      diclofenac sodium (VOLTAREN) 1 % Gel Apply 2 g topically daily as needed (PAIN).      diltiaZEM (CARDIZEM CD) 180 MG 24 hr capsule Take 180 mg by mouth once daily.      DULoxetine (CYMBALTA) 60 MG capsule Take 60 mg by mouth once daily.      fluticasone-vilanterol (BREO ELLIPTA) 100-25 mcg/dose diskus inhaler Inhale 1 puff into the lungs once daily. Controller      gabapentin (NEURONTIN) 300 MG capsule Take 300 mg by mouth once daily.      Lactobacillus rhamnosus-fiber 2.5 billion cell-3.5 gram PwPk Take 1 capsule by mouth.      lansoprazole (PREVACID) 30 MG capsule Take 30 mg by mouth once daily.      losartan (COZAAR) 100 MG tablet Take 100 mg by mouth once daily.       ondansetron (ZOFRAN) 4 MG tablet Take 1 tablet (4 mg total) by mouth every 6 (six) hours as needed. 30 tablet 1    ondansetron (ZOFRAN) 4 MG tablet Take 4-8 mg by mouth.      potassium chloride SA (K-DUR,KLOR-CON) 10 MEQ tablet Take 10 mEq by  mouth.      predniSONE (DELTASONE) 10 MG tablet Take 4 tablets (40 mg) on 12/16, then take 1 tablet (10 mg) daily 30 tablet 0    pyridoxine, vitamin B6, (VITAMIN B-6) 50 MG Tab Take 1 tablet (50 mg total) by mouth once daily.  0    simvastatin (ZOCOR) 40 MG tablet Take 40 mg by mouth.      theophylline (THEODUR) 300 mg 24 hr capsule Take 300 mg by mouth once daily.      traMADol (ULTRAM) 50 mg tablet TK 1 T PO BID PRN  0        Current Inpatient Medications:   aspirin  81 mg Oral Daily    diltiaZEM  180 mg Oral Daily    DULoxetine  60 mg Oral Daily    fluticasone-vilanterol  1 puff Inhalation Daily    gabapentin  300 mg Oral Daily    losartan  100 mg Oral Daily    morphine  1 mg Intravenous Q8H    ondansetron  4 mg Intravenous Q6H    pantoprozole (PROTONIX) IV  40 mg Intravenous Q12H    piperacillin-tazobactam (ZOSYN) IVPB  4.5 g Intravenous Q8H    pyridoxine (vitamin B6)  50 mg Oral Daily    sucralfate  1 g Oral QID (AC & HS)    theophylline  300 mg Oral Daily    vancomycin (VANCOCIN) IVPB  1,000 mg Intravenous Q12H       Past Surgical History:   Procedure Laterality Date    ABDOMINAL SURGERY      APPENDECTOMY N/A 8/26/2018    Performed [facility]rez [facility] at [facility]    APPENDECTOMY, LAPAROSCOPIC---CONVERTED TO OPEN APPENDECTOMY @0950 N/A 8/26/2018    Performed by [facility] at [facility] OR    BACK SURGERY      stimulator    CATARACT EXTRACTION      HYSTERECTOMY         Social History     Socioeconomic History    Marital status:      Spouse name: Not on file    Number of children: Not on file    Years of education: Not on file    Highest education level: Not on file   Social Needs    Financial resource strain: Not on file    Food insecurity - worry: Not on file    Food insecurity - inability: Not on file    Transportation needs - medical: Not on file    Transportation needs - non-medical: Not on file   Occupational History    Not on file   Tobacco Use     Smoking status: Never Smoker    Smokeless tobacco: Never Used   Substance and Sexual Activity    Alcohol use: No     Frequency: Never    Drug use: No    Sexual activity: No     Partners: Male   Other Topics Concern    Not on file   Social History Narrative    Not on file       OBJECTIVE:   Weight:  Wt Readings from Last 4 Encounters:   19 72 kg (158 lb 11.7 oz)   01/15/19 75.9 kg (167 lb 5.3 oz)   18 75.9 kg (167 lb 5.3 oz)   18 75.9 kg (167 lb 5.3 oz)       Vital Signs Range (Last 24H):  Temp:  [36 °C (96.8 °F)-36.6 °C (97.8 °F)]   Pulse:  []   Resp:  [18-25]   BP: (113-167)/()   SpO2:  [94 %-100 %]       CBC:   Lab Results   Component Value Date    WBC 12.04 2019    HGB 9.4 (L) 2019    HCT 27.6 (L) 2019    MCV 76 (L) 2019     2019       CMP:     Chemistry        Component Value Date/Time     2019 1502    K 3.2 (L) 2019 1502     2019 1502    CO2 20 (L) 2019 1502    BUN 4 (L) 2019 1502    CREATININE 0.7 2019 1502    GLU 73 2019 1502        Component Value Date/Time    CALCIUM 8.5 (L) 2019 1502    ALKPHOS 360 (H) 2019 1502    AST 80 (H) 2019 1502    ALT 19 2019 1502    BILITOT 1.8 (H) 2019 1502    ESTGFRAFRICA >60.0 2019 1502    EGFRNONAA >60.0 2019 1502            INR:  Lab Results   Component Value Date    INR 0.9 10/04/2018    INR 1.1 09/10/2018    INR 0.9 2018       Diagnostic Studies:  CT Abd/Pelvis 2019  Posterior right hemipelvis rim enhancing fluid collection, suspicious for abscess.  Possible associated retained appendicolith.    Adjacent urinary bladder wall thickening, suspicious for superimposed cystitis.    EK2019  110bpm sinus tachy, L axis deviation, hx of possible septal infarct     2D Echo:  2018    1 - Concentric remodeling.     2 - No wall motion abnormalities.     3 - Normal left ventricular systolic  function (EF 60-65%).     4 - Impaired LV relaxation, normal LAP (grade 1 diastolic dysfunction).     5 - Normal right ventricular systolic function .     6 - The estimated PA systolic pressure is 14 mmHg.     7 - No evidence of intracardiac shunt.         ASSESSMENT/PLAN:         Anesthesia Evaluation    I have reviewed the Patient Summary Reports.     I have reviewed the Medications.     Review of Systems  Anesthesia Hx:  No problems with previous Anesthesia Hx of Anesthetic complications  History of prior surgery of interest to airway management or planning: Personal Hx of Anesthesia complications  Difficult Intubation Slow To Awaken/Delayed Emergence   Social:  Non-Smoker, No Alcohol Use    Hematology/Oncology:         -- Anemia:   Cardiovascular:   Hypertension CAD   ECG has been reviewed. TTE 8/23/18  CONCLUSIONS     1 - Concentric remodeling.     2 - No wall motion abnormalities.     3 - Normal left ventricular systolic function (EF 60-65%).     4 - Impaired LV relaxation, normal LAP (grade 1 diastolic dysfunction).     5 - Normal right ventricular systolic function .     6 - The estimated PA systolic pressure is 14 mmHg.     7 - No evidence of intracardiac shunt.     Taking Plavix    Carotid U/S  Limited study by body habitus.    Allowing for limitation, 50-69% right ICA stenosis with less than 50% left ICA stenosis.    Antegrade vertebral arteries bilaterally..   Pulmonary:   COPD Asthma moderate PRN O2 at bedtime.    Renal/:   Chronic Renal Disease, ARF    Hepatic/GI:   GERD    Musculoskeletal:  Spine Disorders: lumbar    Neurological:   CVA (old) Baseline R sided weakness   Endocrine:   Diabetes, type 2, using insulin    Psych:  Psychiatric Normal           Physical Exam  General:  Malnutrition    Airway/Jaw/Neck:  Airway Findings: Mouth Opening: Small, but > 3cm Tongue: Normal  General Airway Assessment: Adult  Mallampati: IV  TM Distance: Normal, at least 6 cm  Jaw/Neck Findings:  Neck ROM: Normal ROM       Dental:  Dental Findings: (numerous missing/chipped teeth) Periodontal disease, Severe   Chest/Lungs:  Chest/Lungs Findings: Expiratory Wheezes, Mild, Normal Respiratory Rate     Heart/Vascular:  Heart Findings: Rate: Normal  Rhythm: Regular Rhythm  Sounds: Normal        Mental Status:  Mental Status Findings:  Alert and Oriented, Cooperative         Anesthesia Plan  Type of Anesthesia, risks & benefits discussed:  Anesthesia Type:  general  Patient's Preference:   Intra-op Monitoring Plan: standard ASA monitors  Intra-op Monitoring Plan Comments:   Post Op Pain Control Plan: multimodal analgesia, IV/PO Opioids PRN and per primary service following discharge from PACU  Post Op Pain Control Plan Comments:   Induction:   IV  Beta Blocker:  Patient is not currently on a Beta-Blocker (No further documentation required).       Informed Consent: Patient representative understands risks and agrees with Anesthesia plan.  Questions answered. Anesthesia consent signed with patient representative.  ASA Score: 4     Day of Surgery Review of History & Physical:  There are no significant changes.  H&P update referred to the surgeon.         Ready For Surgery From Anesthesia Perspective.

## 2019-01-24 NOTE — SUBJECTIVE & OBJECTIVE
Interval History:   Patient seen and examined, no acute events overnight  C/o RLQ pain  +N/V  Tachycardic, afebrile    Medications:  Continuous Infusions:   lactated ringers 75 mL/hr at 01/24/19 0129     Scheduled Meds:   aspirin  81 mg Oral Daily    clopidogrel  75 mg Oral Daily    diltiaZEM  180 mg Oral Daily    DULoxetine  60 mg Oral Daily    fluticasone-vilanterol  1 puff Inhalation Daily    gabapentin  300 mg Oral Daily    losartan  100 mg Oral Daily    morphine  1 mg Intravenous Q8H    ondansetron  4 mg Intravenous Q6H    pantoprozole (PROTONIX) IV  40 mg Intravenous Q12H    pyridoxine (vitamin B6)  50 mg Oral Daily    sucralfate  1 g Oral QID (AC & HS)    theophylline  300 mg Oral Daily     PRN Meds:acetaminophen, albuterol-ipratropium, baclofen, dextrose 50 % in water (D50W), dextrose 50 % in water (D50W), diclofenac sodium, glucagon (human recombinant), glucose, glucose, ketorolac, promethazine (PHENERGAN) IVPB, promethazine, ramelteon, senna-docusate 8.6-50 mg, sodium chloride 0.9%     Review of patient's allergies indicates:   Allergen Reactions    Ace inhibitors Swelling    Carvedilol      Other reaction(s): Other (See Comments)  blister    Hydralazine analogues     Metformin Nausea And Vomiting    Tetracycline Itching    Tetracyclines Swelling    Travatan (with benzalkonium) [travoprost (benzalkonium)]     Travoprost Itching     Other reaction(s): Other (See Comments)  Blurry vision     Objective:     Vital Signs (Most Recent):  Temp: 97.8 °F (36.6 °C) (01/24/19 0732)  Pulse: 100 (01/24/19 0732)  Resp: 20 (01/24/19 0732)  BP: (!) 113/55 (01/24/19 0732)  SpO2: 100 % (01/24/19 0732) Vital Signs (24h Range):  Temp:  [96.8 °F (36 °C)-97.8 °F (36.6 °C)] 97.8 °F (36.6 °C)  Pulse:  [] 100  Resp:  [18-25] 20  SpO2:  [95 %-100 %] 100 %  BP: (113-167)/(55-90) 113/55     Weight: 72 kg (158 lb 11.7 oz)  Body mass index is 29.99 kg/m².    Intake/Output - Last 3 Shifts       01/22 0700 -  01/23 0659 01/23 0700 - 01/24 0659 01/24 0700 - 01/25 0659    P.O.   300    IV Piggyback  1100     Total Intake(mL/kg)  1100 (15.3) 300 (4.2)    Net  +1100 +300           Urine Occurrence   2 x    Stool Occurrence   0 x          Physical Exam   Constitutional: She appears well-developed and well-nourished. No distress.   HENT:   Head: Normocephalic and atraumatic.   Cardiovascular:   Tachycardic    Pulmonary/Chest: Effort normal. No respiratory distress.   Abdominal:   Soft, +TTP RLQ  +incisional hernia  Well-healed McBurney's incision. There is palpable firm mass midway where the incision is that is tender to palpation. No overlying skin changes.       Significant Labs:  CBC:   Recent Labs   Lab 01/24/19 0522   WBC 13.42*   RBC 3.65*   HGB 9.4*   HCT 28.8*      MCV 79*   MCH 25.8*   MCHC 32.6     BMP:   Recent Labs   Lab 01/24/19 0522   GLU 52*      K 3.5      CO2 21*   BUN 5*   CREATININE 0.8   CALCIUM 8.8   MG 1.0*     CMP:   Recent Labs   Lab 01/24/19 0522   GLU 52*   CALCIUM 8.8   ALBUMIN 2.0*   PROT 4.8*      K 3.5   CO2 21*      BUN 5*   CREATININE 0.8   ALKPHOS 372*   ALT 16   AST 58*   BILITOT 1.6*     LFTs:   Recent Labs   Lab 01/24/19 0522   ALT 16   AST 58*   ALKPHOS 372*   BILITOT 1.6*   PROT 4.8*   ALBUMIN 2.0*

## 2019-01-24 NOTE — ASSESSMENT & PLAN NOTE
Generalized abdominal pain  Dehydration  Severe sepsis  - acute on chronic issue with evidence of dehydration on admission  - sxs c/b recurrent RLQ abscess, but no evidence of infection on admission and do not suspect sole cause of pain  - CT A/P with contrast shows an irregular shaped rim enhancing fluid collection measuring at least 4.0 x 3.0 cm of the right hemipelvis at the site of prior appendectomy.   - general surgery consulted   - fluid collection stable compared to recent CT scans and no evidence of systemic illness   - lactic acid 6.8, with tachycardia and W of 13 (severe sepsis)   - fluid collection will be drained by general surgery, taking over as primary   - start vanc and zosyn   - bolus 1L, recheck lactate 5.6  - lipase WNL, t-bili elevated to 1.7 (fluctuates 0.8-1.4), alk phos stable- check CMP daily  - protonix BID, start carafate  - schedule zofran and PRN antiemetics. If no improvement in sxs, consider consulting GI, but symptoms improved since admission.  - CLD- ADAT  - pain control, mIVF  - may need repeat EGD

## 2019-01-24 NOTE — CARE UPDATE
Contacted at 1205 by charge RN to evaluate Ms. Sheryl Martines for elevated Lactic Acid. Will assess as soon as possible. Instructed to call  and activate a rapid response if patient's condition changes prior to arrival.

## 2019-01-24 NOTE — SUBJECTIVE & OBJECTIVE
Interval History: Patient with lactic of 6.8, surgery taking over as primary. Broadened antibiotics to cover intra abdominal sepsis.    Review of Systems   Constitutional: Positive for appetite change, fatigue and unexpected weight change (40 lb wt loss in 5 months). Negative for chills, diaphoresis and fever.   HENT: Negative for congestion and rhinorrhea.    Eyes: Negative for photophobia and visual disturbance.   Respiratory: Negative for cough, chest tightness and shortness of breath.    Cardiovascular: Negative for chest pain, palpitations and leg swelling.   Gastrointestinal: Positive for abdominal pain, nausea and vomiting. Negative for diarrhea.   Genitourinary: Negative for difficulty urinating and dysuria.   Musculoskeletal: Positive for back pain (chronic) and gait problem (multifactorial).   Skin: Negative for rash and wound.   Neurological: Positive for tremors (when cold). Negative for dizziness and headaches.   Psychiatric/Behavioral: Negative for agitation and confusion.     Objective:     Vital Signs (Most Recent):  Temp: 97.2 °F (36.2 °C) (01/24/19 1130)  Pulse: 101 (01/24/19 1242)  Resp: 18 (01/24/19 1130)  BP: (!) 151/115 (01/24/19 1130)  SpO2: (!) 94 % (01/24/19 1130) Vital Signs (24h Range):  Temp:  [96.8 °F (36 °C)-97.8 °F (36.6 °C)] 97.2 °F (36.2 °C)  Pulse:  [] 101  Resp:  [18-25] 18  SpO2:  [94 %-100 %] 94 %  BP: (113-167)/() 151/115     Weight: 72 kg (158 lb 11.7 oz)  Body mass index is 29.99 kg/m².    Intake/Output Summary (Last 24 hours) at 1/24/2019 1316  Last data filed at 1/24/2019 0700  Gross per 24 hour   Intake 1400 ml   Output --   Net 1400 ml      Physical Exam   Constitutional: She is oriented to person, place, and time. She appears well-developed and well-nourished.   Pleasant elderly female in NAD   Cardiovascular: Normal rate and regular rhythm.   Murmur (systolic) heard.  Pulmonary/Chest: Effort normal and breath sounds normal. No respiratory distress. She has no  wheezes.   Diminished lung sounds to bases   Abdominal: Soft. Bowel sounds are normal. She exhibits mass (RLQ, small 1 inch x1 inch). She exhibits no distension. There is tenderness (diffuse, most notable to RLQ and RUQ). There is guarding.   Musculoskeletal: She exhibits no edema or tenderness.   R sided hemiparesis  RLE ttp proximal and distal to R knee  Unable to lift legs off bed   Neurological: She is alert and oriented to person, place, and time.   Appropriate, oriented and follows commands   Skin: Skin is warm and dry. No rash noted.   Psychiatric: Judgment and thought content normal. Her affect is blunt. Her speech is delayed. Her speech is not rapid and/or pressured and not tangential. She is slowed.   Nursing note and vitals reviewed.      Significant Labs:   BMP:   Recent Labs   Lab 01/24/19  0522   GLU 52*      K 3.5      CO2 21*   BUN 5*   CREATININE 0.8   CALCIUM 8.8   MG 1.0*     CBC:   Recent Labs   Lab 01/23/19  1338 01/23/19  1346 01/24/19  0522   WBC 10.24  --  13.42*   HGB 11.4*  --  9.4*   HCT 35.2* 38 28.8*     --  211     Lactic Acid:   Recent Labs   Lab 01/24/19  0833 01/24/19  1148   LACTATE 6.8* 5.6*     All pertinent labs within the past 24 hours have been reviewed.    Significant Imaging: I have reviewed all pertinent imaging results/findings within the past 24 hours.

## 2019-01-24 NOTE — PROGRESS NOTES
"[facility]  General Surgery  Progress Note    Subjective:     History of Present Illness:  64 yo W with a history of HTN, COPD on home oxygen, HFpEF, IDDMII, CVA on plavix, HTN, debility after fall and broken hip, and PE who presents to the ED with a several month history of nausea, vomiting, inability to tolerate a diet and weight loss. She has had multiple admissions in the past few months for different ailments. She presents today with continued nausea, vomiting and abdominal pain that is mostly worse in the RLQ. During the examination, she states her pain was all over her abdomen because she was retching so much. She states that she has lost close to 40lbs since her surgery and that she only able to keep broth down. She had a lap converted to open appendectomy back in August 2018 that was complicated by a wound infection, C diff and failure to thrive. This seems to persists. She is now wheelchair bound (per her) and apparently lives in [place] but is here in [place] with her daughter.    She had a CT scan in the ED which revealed an irregular shaped rim enhancing fluid collection measuring at least 4.0 x 3.0 cm ight hemipelvis at the site of prior appendectomy. Surgery was consulted for further recommendations. She was also noted to have a "knot" at the RLQ incision site that is also tender.    Post-Op Info:  * No surgery found *         Interval History:   Patient seen and examined, no acute events overnight  C/o RLQ pain  +N/V  Tachycardic, afebrile    Medications:  Continuous Infusions:   lactated ringers 75 mL/hr at 01/24/19 0129     Scheduled Meds:   aspirin  81 mg Oral Daily    clopidogrel  75 mg Oral Daily    diltiaZEM  180 mg Oral Daily    DULoxetine  60 mg Oral Daily    fluticasone-vilanterol  1 puff Inhalation Daily    gabapentin  300 mg Oral Daily    losartan  100 mg Oral Daily    morphine  1 mg Intravenous Q8H    ondansetron  4 mg Intravenous Q6H    pantoprozole " (PROTONIX) IV  40 mg Intravenous Q12H    pyridoxine (vitamin B6)  50 mg Oral Daily    sucralfate  1 g Oral QID (AC & HS)    theophylline  300 mg Oral Daily     PRN Meds:acetaminophen, albuterol-ipratropium, baclofen, dextrose 50 % in water (D50W), dextrose 50 % in water (D50W), diclofenac sodium, glucagon (human recombinant), glucose, glucose, ketorolac, promethazine (PHENERGAN) IVPB, promethazine, ramelteon, senna-docusate 8.6-50 mg, sodium chloride 0.9%     Review of patient's allergies indicates:   Allergen Reactions    Ace inhibitors Swelling    Carvedilol      Other reaction(s): Other (See Comments)  blister    Hydralazine analogues     Metformin Nausea And Vomiting    Tetracycline Itching    Tetracyclines Swelling    Travatan (with benzalkonium) [travoprost (benzalkonium)]     Travoprost Itching     Other reaction(s): Other (See Comments)  Blurry vision     Objective:     Vital Signs (Most Recent):  Temp: 97.8 °F (36.6 °C) (01/24/19 0732)  Pulse: 100 (01/24/19 0732)  Resp: 20 (01/24/19 0732)  BP: (!) 113/55 (01/24/19 0732)  SpO2: 100 % (01/24/19 0732) Vital Signs (24h Range):  Temp:  [96.8 °F (36 °C)-97.8 °F (36.6 °C)] 97.8 °F (36.6 °C)  Pulse:  [] 100  Resp:  [18-25] 20  SpO2:  [95 %-100 %] 100 %  BP: (113-167)/(55-90) 113/55     Weight: 72 kg (158 lb 11.7 oz)  Body mass index is 29.99 kg/m².    Intake/Output - Last 3 Shifts       01/22 0700 - 01/23 0659 01/23 0700 - 01/24 0659 01/24 0700 - 01/25 0659    P.O.   300    IV Piggyback  1100     Total Intake(mL/kg)  1100 (15.3) 300 (4.2)    Net  +1100 +300           Urine Occurrence   2 x    Stool Occurrence   0 x          Physical Exam   Constitutional: She appears well-developed and well-nourished. No distress.   HENT:   Head: Normocephalic and atraumatic.   Cardiovascular:   Tachycardic    Pulmonary/Chest: Effort normal. No respiratory distress.   Abdominal:   Soft, +TTP RLQ  +incisional hernia  Well-healed McBurney's incision. There is  palpable firm mass midway where the incision is that is tender to palpation. No overlying skin changes.       Significant Labs:  CBC:   Recent Labs   Lab 01/24/19 0522   WBC 13.42*   RBC 3.65*   HGB 9.4*   HCT 28.8*      MCV 79*   MCH 25.8*   MCHC 32.6     BMP:   Recent Labs   Lab 01/24/19 0522   GLU 52*      K 3.5      CO2 21*   BUN 5*   CREATININE 0.8   CALCIUM 8.8   MG 1.0*     CMP:   Recent Labs   Lab 01/24/19 0522   GLU 52*   CALCIUM 8.8   ALBUMIN 2.0*   PROT 4.8*      K 3.5   CO2 21*      BUN 5*   CREATININE 0.8   ALKPHOS 372*   ALT 16   AST 58*   BILITOT 1.6*     LFTs:   Recent Labs   Lab 01/24/19 0522   ALT 16   AST 58*   ALKPHOS 372*   BILITOT 1.6*   PROT 4.8*   ALBUMIN 2.0*     Assessment/Plan:     * Intractable nausea and vomiting    -current symptoms not likely related to chronic fluid collection  -would recommend IR for drain placement if fluid collection needs to be drained  -will discuss case with staff           Nunu Cee PA-C   [id number]  General Surgery  [facility]

## 2019-01-24 NOTE — SUBJECTIVE & OBJECTIVE
No current facility-administered medications on file prior to encounter.      Current Outpatient Medications on File Prior to Encounter   Medication Sig    acetaminophen (TYLENOL) 500 MG tablet Take 1,000 mg by mouth 2 (two) times daily as needed for Pain.    albuterol (PROVENTIL/VENTOLIN HFA) 90 mcg/actuation inhaler Inhale 2 puffs into the lungs every 6 (six) hours as needed for Wheezing or Shortness of Breath. Rescue    albuterol-ipratropium (DUO-NEB) 2.5 mg-0.5 mg/3 mL nebulizer solution Take 3 mLs by nebulization every 4 (four) hours as needed for Wheezing or Shortness of Breath. Rescue     aspirin (ECOTRIN) 81 MG EC tablet Take 1 tablet (81 mg total) by mouth once daily.    baclofen (LIORESAL) 10 MG tablet Take 10 mg by mouth 2 (two) times daily as needed (MUSCLE SPASMS).    baclofen (LIORESAL) 10 MG tablet Take 10 mg by mouth.    cefdinir (OMNICEF) 300 MG capsule TK ONE C PO  BID FOR 7 DAYS    cephALEXin (KEFLEX) 750 MG capsule TK ONE C PO TID FOR 5 DAYS    ciprofloxacin HCl (CIPRO) 250 MG tablet Take 1 tablet (250 mg total) by mouth every 12 (twelve) hours. Starting 12/15 evening    clopidogrel (PLAVIX) 75 mg tablet Take 75 mg by mouth once daily.    diclofenac sodium (VOLTAREN) 1 % Gel Apply 2 g topically daily as needed (PAIN).    diltiaZEM (CARDIZEM CD) 180 MG 24 hr capsule Take 180 mg by mouth once daily.    DULoxetine (CYMBALTA) 60 MG capsule Take 60 mg by mouth once daily.    fluticasone-vilanterol (BREO ELLIPTA) 100-25 mcg/dose diskus inhaler Inhale 1 puff into the lungs once daily. Controller    furosemide (LASIX) 40 MG tablet Take 40 mg by mouth once daily.     gabapentin (NEURONTIN) 300 MG capsule Take 300 mg by mouth once daily.    HYDROcodone-acetaminophen (NORCO) 5-325 mg per tablet Take 1 tablet by mouth every 4 to 6 hours as needed.    Lactobacillus rhamnosus-fiber 2.5 billion cell-3.5 gram PwPk Take 1 capsule by mouth.    lansoprazole (PREVACID) 30 MG capsule Take 30 mg by  mouth once daily.    losartan (COZAAR) 100 MG tablet Take 100 mg by mouth once daily.     ondansetron (ZOFRAN) 4 MG tablet Take 1 tablet (4 mg total) by mouth every 6 (six) hours as needed.    ondansetron (ZOFRAN) 4 MG tablet Take 4-8 mg by mouth.    potassium chloride SA (K-DUR,KLOR-CON) 10 MEQ tablet Take 10 mEq by mouth.    prazosin (MINIPRESS) 5 MG capsule TK 1 C PO BID    predniSONE (DELTASONE) 10 MG tablet Take 4 tablets (40 mg) on 12/16, then take 1 tablet (10 mg) daily    pyridoxine, vitamin B6, (VITAMIN B-6) 50 MG Tab Take 1 tablet (50 mg total) by mouth once daily.    simvastatin (ZOCOR) 40 MG tablet Take 40 mg by mouth.    theophylline (THEODUR) 300 mg 24 hr capsule Take 300 mg by mouth once daily.    traMADol (ULTRAM) 50 mg tablet TK 1 T PO BID PRN    [DISCONTINUED] insulin detemir U-100 (LEVEMIR FLEXTOUCH) 100 unit/mL (3 mL) SubQ InPn pen Inject 20 Units into the skin 2 (two) times daily.       Review of patient's allergies indicates:   Allergen Reactions    Ace inhibitors Swelling    Carvedilol      Other reaction(s): Other (See Comments)  blister    Hydralazine analogues     Metformin Nausea And Vomiting    Tetracycline Itching    Tetracyclines Swelling    Travatan (with benzalkonium) [travoprost (benzalkonium)]     Travoprost Itching     Other reaction(s): Other (See Comments)  Blurry vision       Past Medical History:   Diagnosis Date    Abnormal LFTs 12/14/2018    Asthma     Closed compression fracture of fourth lumbar vertebra     COPD (chronic obstructive pulmonary disease)     Coronary artery disease     Diabetes mellitus     Fall 10/4/2018    Glaucoma     High cholesterol     Hypertension     Infected incision 9/20/2018    Iritis     Pulmonary embolus     S/P appendectomy 9/11/2018    Stroke     rt sided weakness.     Past Surgical History:   Procedure Laterality Date    ABDOMINAL SURGERY      APPENDECTOMY N/A 8/26/2018    Performed [facility]rez [facility]  at [facility]    APPENDECTOMY, LAPAROSCOPIC---CONVERTED TO OPEN APPENDECTOMY @0950 N/A 8/26/2018    Performed by [facility] at [facility] OR    BACK SURGERY      stimulator    CATARACT EXTRACTION      HYSTERECTOMY       Family History     Problem Relation (Age of Onset)    Cancer Father    Diabetes Brother    Lupus Sister    Multiple sclerosis Mother        Tobacco Use    Smoking status: Never Smoker    Smokeless tobacco: Never Used   Substance and Sexual Activity    Alcohol use: No     Frequency: Never    Drug use: No    Sexual activity: No     Partners: Male     Review of Systems   Constitutional: Positive for activity change, appetite change, fatigue and unexpected weight change.   HENT: Negative.    Respiratory: Positive for shortness of breath and wheezing.    Cardiovascular: Negative for chest pain and palpitations.   Gastrointestinal: Positive for abdominal pain, nausea and vomiting.   Genitourinary: Negative for difficulty urinating and dysuria.   Musculoskeletal: Positive for arthralgias.   Skin: Negative.    Neurological: Positive for weakness.   Hematological: Negative.    Psychiatric/Behavioral: Negative.      Objective:     Vital Signs (Most Recent):  Temp: 97.8 °F (36.6 °C) (01/23/19 1214)  Pulse: 96 (01/23/19 1950)  Resp: 18 (01/23/19 1214)  BP: (!) 150/70 (01/23/19 1950)  SpO2: 100 % (01/23/19 1950) Vital Signs (24h Range):  Temp:  [97.8 °F (36.6 °C)] 97.8 °F (36.6 °C)  Pulse:  [] 96  Resp:  [18] 18  SpO2:  [95 %-100 %] 100 %  BP: (132-167)/(70-78) 150/70     Weight: 75.8 kg (167 lb)  Body mass index is 31.55 kg/m².    Physical Exam   Constitutional: She appears well-developed. No distress.   HENT:   Head: Normocephalic and atraumatic.   Neck: Normal range of motion. Neck supple.   Cardiovascular: Regular rhythm. Tachycardia present.   Pulmonary/Chest: Effort normal. No stridor. No respiratory distress. She has wheezes.   Abdominal: Soft. There is tenderness. There is no rebound  and no guarding. A hernia is present.       Well-healed McBurney's incision. There is palpable firm mass midway where the incision is that is tender to palpation. No overlying skin changes.   Neurological: She is alert.   Skin: Skin is warm and dry.   Psychiatric: She has a normal mood and affect. Her behavior is normal.       Significant Labs:  CBC:   Recent Labs   Lab 01/23/19  1338 01/23/19  1346   WBC 10.24  --    RBC 4.33  --    HGB 11.4*  --    HCT 35.2* 38     --    MCV 81*  --    MCH 26.3*  --    MCHC 32.4  --      CMP:   Recent Labs   Lab 01/23/19  1338   GLU 81   CALCIUM 9.4   ALBUMIN 2.3*   PROT 5.5*   *   K 3.0*   CO2 24   CL 95   BUN 6*   CREATININE 0.8   ALKPHOS 417*   ALT 16   AST 55*   BILITOT 1.7*       Significant Diagnostics:  CT abd/pelvis 1/23/19:     Posterior right hemipelvis rim enhancing fluid collection, suspicious for abscess.  Possible associated retained appendicolith.    Adjacent urinary bladder wall thickening, suspicious for superimposed cystitis.

## 2019-01-24 NOTE — PLAN OF CARE
01/24/19 0950   Discharge Assessment   Assessment Type Discharge Planning Assessment   Confirmed/corrected address and phone number on facesheet? Yes   Assessment information obtained from? Patient   Expected Length of Stay (days) 2   Communicated expected length of stay with patient/caregiver yes   Prior to hospitilization cognitive status: Alert/Oriented   Prior to hospitalization functional status: Assistive Equipment   Current cognitive status: Alert/Oriented   Current Functional Status: Needs Assistance   Lives With child(nandini), adult  (adult daughter, Citlaly Ritter ([phone]))   Able to Return to Prior Arrangements other (see comments)  (unsure)   Is patient able to care for self after discharge? Unable to determine at this time (comments)   Patient's perception of discharge disposition home health  (pt current w/[facility])   Readmission Within the Last 30 Days no previous admission in last 30 days   Patient currently being followed by outpatient case management? No   Equipment Currently Used at Home wheelchair;walker, rolling;bedside commode;rollator;oxygen;shower chair   Do you have any problems affording any of your prescribed medications? No   Is the patient taking medications as prescribed? yes   Does the patient have transportation home? Yes   Transportation Anticipated family or friend will provide  (daughter)   Does the patient receive services at the [facility]? No   Discharge Plan A Home Health  ([facility])   Discharge Plan B Skilled Nursing Facility   DME Needed Upon Discharge  none   Patient/Family in Agreement with Plan yes     Dx: intractable nausea & vomiting  Consult: gen surg, dty, PT/OT  Pharm: [facility] f/u appt: Dr. Froylan Sands (PCP)     CM cheyenne Farmer ([id number]) w/[facility] of patient's hospitalization. Patient was receiving NSG & PT services. Will continue to follow.

## 2019-01-24 NOTE — PROGRESS NOTES
Notified Marcie with [facility] pt unable to take medication at this time as she is currently throwing up. OK to hold morning meds. Will continue to monitor pt.

## 2019-01-24 NOTE — ASSESSMENT & PLAN NOTE
Last echo 8/2018 with no WMAs, grade 1DD, EF 60  - hypovolemic on exam  - no longer uses lasix; 100% on RA  - strict I/O, daily weights

## 2019-01-24 NOTE — ASSESSMENT & PLAN NOTE
-current symptoms not likely related to chronic fluid collection  -would recommend IR for drain placement if fluid collection needs to be drained  -will discuss case with staff

## 2019-01-24 NOTE — CONSULTS
"[facility]  General Surgery  Consult Note    Patient Name: Sheryl Martines  MRN: [id number]  Code Status: Prior  Admission Date: 1/23/2019  Hospital Length of Stay: 0 days  Attending Physician: Cheli Faye MD  Primary Care Provider: Primary Doctor No    Patient information was obtained from patient, past medical records and ER records.     Inpatient consult to General surgery  Consult performed by: Christopher Huang MD  Consult ordered by: Trisha Rollins NP        Subjective:     Principal Problem: abdominal pain, nausea, and vomiting    History of Present Illness: 64 yo W with a history of HTN, COPD on home oxygen, HFpEF, IDDMII, CVA on plavix, HTN, debility after fall and broken hip, and PE who presents to the ED with a several month history of nausea, vomiting, inability to tolerate a diet and weight loss. She has had multiple admissions in the past few months for different ailments. She presents today with continued nausea, vomiting and abdominal pain that is mostly worse in the RLQ. During the examination, she states her pain was all over her abdomen because she was retching so much. She states that she has lost close to 40lbs since her surgery and that she only able to keep broth down. She had a lap converted to open appendectomy back in August 2018 that was complicated by a wound infection, C diff and failure to thrive. This seems to persists. She is now wheelchair bound (per her) and apparently lives in [place] but is here in [place] with her daughter.    She had a CT scan in the ED which revealed an irregular shaped rim enhancing fluid collection measuring at least 4.0 x 3.0 cm ight hemipelvis at the site of prior appendectomy. Surgery was consulted for further recommendations. She was also noted to have a "knot" at the RLQ incision site that is also tender.    No current facility-administered medications on file prior to encounter.      Current Outpatient Medications on File " Prior to Encounter   Medication Sig    acetaminophen (TYLENOL) 500 MG tablet Take 1,000 mg by mouth 2 (two) times daily as needed for Pain.    albuterol (PROVENTIL/VENTOLIN HFA) 90 mcg/actuation inhaler Inhale 2 puffs into the lungs every 6 (six) hours as needed for Wheezing or Shortness of Breath. Rescue    albuterol-ipratropium (DUO-NEB) 2.5 mg-0.5 mg/3 mL nebulizer solution Take 3 mLs by nebulization every 4 (four) hours as needed for Wheezing or Shortness of Breath. Rescue     aspirin (ECOTRIN) 81 MG EC tablet Take 1 tablet (81 mg total) by mouth once daily.    baclofen (LIORESAL) 10 MG tablet Take 10 mg by mouth 2 (two) times daily as needed (MUSCLE SPASMS).    baclofen (LIORESAL) 10 MG tablet Take 10 mg by mouth.    cefdinir (OMNICEF) 300 MG capsule TK ONE C PO  BID FOR 7 DAYS    cephALEXin (KEFLEX) 750 MG capsule TK ONE C PO TID FOR 5 DAYS    ciprofloxacin HCl (CIPRO) 250 MG tablet Take 1 tablet (250 mg total) by mouth every 12 (twelve) hours. Starting 12/15 evening    clopidogrel (PLAVIX) 75 mg tablet Take 75 mg by mouth once daily.    diclofenac sodium (VOLTAREN) 1 % Gel Apply 2 g topically daily as needed (PAIN).    diltiaZEM (CARDIZEM CD) 180 MG 24 hr capsule Take 180 mg by mouth once daily.    DULoxetine (CYMBALTA) 60 MG capsule Take 60 mg by mouth once daily.    fluticasone-vilanterol (BREO ELLIPTA) 100-25 mcg/dose diskus inhaler Inhale 1 puff into the lungs once daily. Controller    furosemide (LASIX) 40 MG tablet Take 40 mg by mouth once daily.     gabapentin (NEURONTIN) 300 MG capsule Take 300 mg by mouth once daily.    HYDROcodone-acetaminophen (NORCO) 5-325 mg per tablet Take 1 tablet by mouth every 4 to 6 hours as needed.    Lactobacillus rhamnosus-fiber 2.5 billion cell-3.5 gram PwPk Take 1 capsule by mouth.    lansoprazole (PREVACID) 30 MG capsule Take 30 mg by mouth once daily.    losartan (COZAAR) 100 MG tablet Take 100 mg by mouth once daily.     ondansetron (ZOFRAN) 4 MG  tablet Take 1 tablet (4 mg total) by mouth every 6 (six) hours as needed.    ondansetron (ZOFRAN) 4 MG tablet Take 4-8 mg by mouth.    potassium chloride SA (K-DUR,KLOR-CON) 10 MEQ tablet Take 10 mEq by mouth.    prazosin (MINIPRESS) 5 MG capsule TK 1 C PO BID    predniSONE (DELTASONE) 10 MG tablet Take 4 tablets (40 mg) on 12/16, then take 1 tablet (10 mg) daily    pyridoxine, vitamin B6, (VITAMIN B-6) 50 MG Tab Take 1 tablet (50 mg total) by mouth once daily.    simvastatin (ZOCOR) 40 MG tablet Take 40 mg by mouth.    theophylline (THEODUR) 300 mg 24 hr capsule Take 300 mg by mouth once daily.    traMADol (ULTRAM) 50 mg tablet TK 1 T PO BID PRN    [DISCONTINUED] insulin detemir U-100 (LEVEMIR FLEXTOUCH) 100 unit/mL (3 mL) SubQ InPn pen Inject 20 Units into the skin 2 (two) times daily.       Review of patient's allergies indicates:   Allergen Reactions    Ace inhibitors Swelling    Carvedilol      Other reaction(s): Other (See Comments)  blister    Hydralazine analogues     Metformin Nausea And Vomiting    Tetracycline Itching    Tetracyclines Swelling    Travatan (with benzalkonium) [travoprost (benzalkonium)]     Travoprost Itching     Other reaction(s): Other (See Comments)  Blurry vision       Past Medical History:   Diagnosis Date    Abnormal LFTs 12/14/2018    Asthma     Closed compression fracture of fourth lumbar vertebra     COPD (chronic obstructive pulmonary disease)     Coronary artery disease     Diabetes mellitus     Fall 10/4/2018    Glaucoma     High cholesterol     Hypertension     Infected incision 9/20/2018    Iritis     Pulmonary embolus     S/P appendectomy 9/11/2018    Stroke     rt sided weakness.     Past Surgical History:   Procedure Laterality Date    ABDOMINAL SURGERY      APPENDECTOMY N/A 8/26/2018    Performed [facility]    APPENDECTOMY, LAPAROSCOPIC---CONVERTED TO OPEN APPENDECTOMY @0950 N/A 8/26/2018    Performed [facility]  [facility]    BACK SURGERY      stimulator    CATARACT EXTRACTION      HYSTERECTOMY       Family History     Problem Relation (Age of Onset)    Cancer Father    Diabetes Brother    Lupus Sister    Multiple sclerosis Mother        Tobacco Use    Smoking status: Never Smoker    Smokeless tobacco: Never Used   Substance and Sexual Activity    Alcohol use: No     Frequency: Never    Drug use: No    Sexual activity: No     Partners: Male     Review of Systems   Constitutional: Positive for activity change, appetite change, fatigue and unexpected weight change.   HENT: Negative.    Respiratory: Positive for shortness of breath and wheezing.    Cardiovascular: Negative for chest pain and palpitations.   Gastrointestinal: Positive for abdominal pain, nausea and vomiting.   Genitourinary: Negative for difficulty urinating and dysuria.   Musculoskeletal: Positive for arthralgias.   Skin: Negative.    Neurological: Positive for weakness.   Hematological: Negative.    Psychiatric/Behavioral: Negative.      Objective:     Vital Signs (Most Recent):  Temp: 97.8 °F (36.6 °C) (01/23/19 1214)  Pulse: 96 (01/23/19 1950)  Resp: 18 (01/23/19 1214)  BP: (!) 150/70 (01/23/19 1950)  SpO2: 100 % (01/23/19 1950) Vital Signs (24h Range):  Temp:  [97.8 °F (36.6 °C)] 97.8 °F (36.6 °C)  Pulse:  [] 96  Resp:  [18] 18  SpO2:  [95 %-100 %] 100 %  BP: (132-167)/(70-78) 150/70     Weight: 75.8 kg (167 lb)  Body mass index is 31.55 kg/m².    Physical Exam   Constitutional: She appears well-developed. No distress.   HENT:   Head: Normocephalic and atraumatic.   Neck: Normal range of motion. Neck supple.   Cardiovascular: Regular rhythm. Tachycardia present.   Pulmonary/Chest: Effort normal. No stridor. No respiratory distress. She has wheezes.   Abdominal: Soft. There is tenderness. There is no rebound and no guarding. A hernia is present.       Well-healed McBurney's incision. There is palpable firm mass midway  where the incision is that is tender to palpation. No overlying skin changes.   Neurological: She is alert.   Skin: Skin is warm and dry.   Psychiatric: She has a normal mood and affect. Her behavior is normal.       Significant Labs:  CBC:   Recent Labs   Lab 01/23/19  1338 01/23/19  1346   WBC 10.24  --    RBC 4.33  --    HGB 11.4*  --    HCT 35.2* 38     --    MCV 81*  --    MCH 26.3*  --    MCHC 32.4  --      CMP:   Recent Labs   Lab 01/23/19  1338   GLU 81   CALCIUM 9.4   ALBUMIN 2.3*   PROT 5.5*   *   K 3.0*   CO2 24   CL 95   BUN 6*   CREATININE 0.8   ALKPHOS 417*   ALT 16   AST 55*   BILITOT 1.7*       Significant Diagnostics:  CT abd/pelvis 1/23/19:     Posterior right hemipelvis rim enhancing fluid collection, suspicious for abscess.  Possible associated retained appendicolith.    Adjacent urinary bladder wall thickening, suspicious for superimposed cystitis.    Assessment/Plan:     Intractable nausea and vomiting    -Patient with a long history of nausea and vomiting (unsure of length; during exam there were no family members in room) but she states that her this started after her surgery 4 months ago. She has been admitted multiple times for a variety of issues and per her, has lost at least 40 lbs since this ordeal started. Upon further review of her images, she seems to have had this fluid collection in her right hemipelvis at the site of her appendectomy since her CT scan done 9/30/18. This fluid collection has actually decreased in size following her scans up to now. She is afebrile and has no leukocytosis. I highly doubt that her current symptoms are due to this fluid collection that she has had for months now. She is into exhibiting signs of it being infected either (no fevers, no leukocytosis). She does have a hernia at her incision site in the RLQ which is the most focal point of her tenderness. The hernia only contains fat and that can maybe be addressed at a later day.   -She had  EGD done at an OSH which revealed chronic gastritis and LA grade A esophagitis which is likely related to her persistent emesis. Recommend admit to medicine for further work up of unintentional weight loss, nausea and vomiting. Fluid collection is not infected and likely has rim due to chronicity. If it needs to be drained, IR will be best approach but would hold off for now.  -Surgery will follow       VTE Risk Mitigation (From admission, onward)    None          Thank you for your consult. I will follow-up with patient. Please contact us if you have any additional questions.    Christopher Huang MD  General Surgery  [facility]

## 2019-01-25 ENCOUNTER — ANESTHESIA (OUTPATIENT)
Dept: SURGERY | Facility: HOSPITAL | Age: 64
DRG: 853 | End: 2019-01-25
Payer: MEDICARE

## 2019-01-25 PROBLEM — F32.A DEPRESSION: Status: ACTIVE | Noted: 2019-01-25

## 2019-01-25 PROBLEM — R11.2 INTRACTABLE NAUSEA AND VOMITING: Status: RESOLVED | Noted: 2019-01-23 | Resolved: 2019-01-25

## 2019-01-25 PROBLEM — E83.39 HYPOPHOSPHATEMIA: Status: ACTIVE | Noted: 2019-01-25

## 2019-01-25 PROBLEM — E43 SEVERE MALNUTRITION: Status: ACTIVE | Noted: 2018-09-21

## 2019-01-25 PROBLEM — N73.9 PELVIC ABSCESS IN FEMALE: Status: ACTIVE | Noted: 2019-01-25

## 2019-01-25 PROBLEM — E83.42 HYPOMAGNESEMIA: Status: RESOLVED | Noted: 2018-11-01 | Resolved: 2019-01-25

## 2019-01-25 LAB
ABO + RH BLD: NORMAL
ALBUMIN SERPL BCP-MCNC: 1.6 G/DL
ALBUMIN SERPL BCP-MCNC: 1.7 G/DL
ALBUMIN SERPL BCP-MCNC: 1.8 G/DL
ALLENS TEST: ABNORMAL
ALP SERPL-CCNC: 299 U/L
ALP SERPL-CCNC: 314 U/L
ALP SERPL-CCNC: 341 U/L
ALT SERPL W/O P-5'-P-CCNC: 11 U/L
ALT SERPL W/O P-5'-P-CCNC: 14 U/L
ALT SERPL W/O P-5'-P-CCNC: 14 U/L
ANION GAP SERPL CALC-SCNC: 11 MMOL/L
ANION GAP SERPL CALC-SCNC: 8 MMOL/L
ANION GAP SERPL CALC-SCNC: 8 MMOL/L
ANISOCYTOSIS BLD QL SMEAR: SLIGHT
ANISOCYTOSIS BLD QL SMEAR: SLIGHT
AST SERPL-CCNC: 32 U/L
AST SERPL-CCNC: 44 U/L
AST SERPL-CCNC: 45 U/L
BASOPHILS # BLD AUTO: 0.04 K/UL
BASOPHILS # BLD AUTO: 0.04 K/UL
BASOPHILS # BLD AUTO: 0.1 K/UL
BASOPHILS # BLD AUTO: 0.11 K/UL
BASOPHILS NFR BLD: 0.5 %
BASOPHILS NFR BLD: 0.5 %
BASOPHILS NFR BLD: 0.6 %
BASOPHILS NFR BLD: 0.9 %
BILIRUB SERPL-MCNC: 1.4 MG/DL
BILIRUB SERPL-MCNC: 1.5 MG/DL
BILIRUB SERPL-MCNC: 1.6 MG/DL
BLD GP AB SCN CELLS X3 SERPL QL: NORMAL
BUN SERPL-MCNC: 2 MG/DL
BUN SERPL-MCNC: 2 MG/DL
BUN SERPL-MCNC: 3 MG/DL
BURR CELLS BLD QL SMEAR: ABNORMAL
BURR CELLS BLD QL SMEAR: ABNORMAL
CA-I BLDV-SCNC: 1.14 MMOL/L
CA-I BLDV-SCNC: 1.16 MMOL/L
CA-I BLDV-SCNC: 1.17 MMOL/L
CALCIUM SERPL-MCNC: 7.6 MG/DL
CALCIUM SERPL-MCNC: 7.9 MG/DL
CALCIUM SERPL-MCNC: 8.2 MG/DL
CHLORIDE SERPL-SCNC: 104 MMOL/L
CHLORIDE SERPL-SCNC: 107 MMOL/L
CHLORIDE SERPL-SCNC: 108 MMOL/L
CO2 SERPL-SCNC: 16 MMOL/L
CO2 SERPL-SCNC: 20 MMOL/L
CO2 SERPL-SCNC: 22 MMOL/L
CREAT SERPL-MCNC: 0.7 MG/DL
DELSYS: ABNORMAL
DIFFERENTIAL METHOD: ABNORMAL
EOSINOPHIL # BLD AUTO: 0.1 K/UL
EOSINOPHIL # BLD AUTO: 0.1 K/UL
EOSINOPHIL # BLD AUTO: 0.3 K/UL
EOSINOPHIL # BLD AUTO: 0.3 K/UL
EOSINOPHIL NFR BLD: 1.4 %
EOSINOPHIL NFR BLD: 1.4 %
EOSINOPHIL NFR BLD: 1.8 %
EOSINOPHIL NFR BLD: 2.7 %
EP: 5
ERYTHROCYTE [DISTWIDTH] IN BLOOD BY AUTOMATED COUNT: 17.6 %
ERYTHROCYTE [DISTWIDTH] IN BLOOD BY AUTOMATED COUNT: 18.5 %
ERYTHROCYTE [DISTWIDTH] IN BLOOD BY AUTOMATED COUNT: 18.6 %
ERYTHROCYTE [DISTWIDTH] IN BLOOD BY AUTOMATED COUNT: 18.6 %
ERYTHROCYTE [SEDIMENTATION RATE] IN BLOOD BY WESTERGREN METHOD: 12 MM/H
ERYTHROCYTE [SEDIMENTATION RATE] IN BLOOD BY WESTERGREN METHOD: 4 MM/H
EST. GFR  (AFRICAN AMERICAN): >60 ML/MIN/1.73 M^2
EST. GFR  (NON AFRICAN AMERICAN): >60 ML/MIN/1.73 M^2
FIO2: 35
GLUCOSE SERPL-MCNC: 65 MG/DL
GLUCOSE SERPL-MCNC: 67 MG/DL
GLUCOSE SERPL-MCNC: 98 MG/DL
GRAM STN SPEC: NORMAL
HCO3 UR-SCNC: 13.5 MMOL/L (ref 24–28)
HCO3 UR-SCNC: 15.6 MMOL/L (ref 24–28)
HCO3 UR-SCNC: 16.3 MMOL/L (ref 24–28)
HCT VFR BLD AUTO: 25.4 %
HCT VFR BLD AUTO: 26.4 %
HCT VFR BLD AUTO: 26.4 %
HCT VFR BLD AUTO: 28.9 %
HCT VFR BLD CALC: 28 %PCV (ref 36–54)
HGB BLD-MCNC: 8.5 G/DL
HGB BLD-MCNC: 9.4 G/DL
HYPOCHROMIA BLD QL SMEAR: ABNORMAL
HYPOCHROMIA BLD QL SMEAR: ABNORMAL
IMM GRANULOCYTES # BLD AUTO: 0.04 K/UL
IMM GRANULOCYTES # BLD AUTO: 0.04 K/UL
IMM GRANULOCYTES # BLD AUTO: 0.06 K/UL
IMM GRANULOCYTES # BLD AUTO: 0.09 K/UL
IMM GRANULOCYTES NFR BLD AUTO: 0.5 %
IP: 14
LACTATE SERPL-SCNC: 3.4 MMOL/L
LACTATE SERPL-SCNC: 3.6 MMOL/L
LACTATE SERPL-SCNC: 3.7 MMOL/L
LACTATE SERPL-SCNC: 3.9 MMOL/L
LACTATE SERPL-SCNC: 7.1 MMOL/L
LACTATE SERPL-SCNC: 7.1 MMOL/L
LACTATE SERPL-SCNC: 9.4 MMOL/L
LYMPHOCYTES # BLD AUTO: 1.5 K/UL
LYMPHOCYTES # BLD AUTO: 1.5 K/UL
LYMPHOCYTES # BLD AUTO: 2.2 K/UL
LYMPHOCYTES # BLD AUTO: 7.1 K/UL
LYMPHOCYTES NFR BLD: 19 %
LYMPHOCYTES NFR BLD: 19 %
LYMPHOCYTES NFR BLD: 19.3 %
LYMPHOCYTES NFR BLD: 40.1 %
MAGNESIUM SERPL-MCNC: 1.7 MG/DL
MAGNESIUM SERPL-MCNC: 1.9 MG/DL
MAGNESIUM SERPL-MCNC: 2.1 MG/DL
MCH RBC QN AUTO: 25.1 PG
MCH RBC QN AUTO: 25.3 PG
MCH RBC QN AUTO: 25.4 PG
MCH RBC QN AUTO: 25.4 PG
MCHC RBC AUTO-ENTMCNC: 32.2 G/DL
MCHC RBC AUTO-ENTMCNC: 32.2 G/DL
MCHC RBC AUTO-ENTMCNC: 32.5 G/DL
MCHC RBC AUTO-ENTMCNC: 33.5 G/DL
MCV RBC AUTO: 76 FL
MCV RBC AUTO: 77 FL
MCV RBC AUTO: 79 FL
MCV RBC AUTO: 79 FL
MIN VOL: 11.5
MIN VOL: 8.96
MODE: ABNORMAL
MONOCYTES # BLD AUTO: 0.4 K/UL
MONOCYTES # BLD AUTO: 0.4 K/UL
MONOCYTES # BLD AUTO: 0.9 K/UL
MONOCYTES # BLD AUTO: 1.1 K/UL
MONOCYTES NFR BLD: 5.2 %
MONOCYTES NFR BLD: 5.2 %
MONOCYTES NFR BLD: 6 %
MONOCYTES NFR BLD: 8 %
NEUTROPHILS # BLD AUTO: 5.7 K/UL
NEUTROPHILS # BLD AUTO: 5.7 K/UL
NEUTROPHILS # BLD AUTO: 7.6 K/UL
NEUTROPHILS # BLD AUTO: 9.1 K/UL
NEUTROPHILS NFR BLD: 51 %
NEUTROPHILS NFR BLD: 68.6 %
NEUTROPHILS NFR BLD: 73.4 %
NEUTROPHILS NFR BLD: 73.4 %
NRBC BLD-RTO: 0 /100 WBC
PCO2 BLDA: 32.8 MMHG (ref 35–45)
PCO2 BLDA: 37.7 MMHG (ref 35–45)
PCO2 BLDA: 49.1 MMHG (ref 35–45)
PH SMN: 7.11 [PH] (ref 7.35–7.45)
PH SMN: 7.22 [PH] (ref 7.35–7.45)
PH SMN: 7.25 [PH] (ref 7.35–7.45)
PHOSPHATE SERPL-MCNC: 1.7 MG/DL
PHOSPHATE SERPL-MCNC: 2.1 MG/DL
PHOSPHATE SERPL-MCNC: 2.3 MG/DL
PLATELET # BLD AUTO: 175 K/UL
PLATELET # BLD AUTO: 175 K/UL
PLATELET # BLD AUTO: 185 K/UL
PLATELET # BLD AUTO: 200 K/UL
PMV BLD AUTO: 10.1 FL
PMV BLD AUTO: 10.1 FL
PMV BLD AUTO: 10.7 FL
PMV BLD AUTO: 9.9 FL
PO2 BLDA: 107 MMHG (ref 80–100)
PO2 BLDA: 116 MMHG (ref 80–100)
PO2 BLDA: 186 MMHG (ref 80–100)
POC BE: -11 MMOL/L
POC BE: -14 MMOL/L
POC BE: -14 MMOL/L
POC IONIZED CALCIUM: 1.13 MMOL/L (ref 1.06–1.42)
POC SATURATED O2: 97 % (ref 95–100)
POC SATURATED O2: 97 % (ref 95–100)
POC SATURATED O2: 99 % (ref 95–100)
POC TCO2: 14 MMOL/L (ref 23–27)
POC TCO2: 17 MMOL/L (ref 23–27)
POC TCO2: 17 MMOL/L (ref 23–27)
POCT GLUCOSE: 100 MG/DL (ref 70–110)
POCT GLUCOSE: 115 MG/DL (ref 70–110)
POCT GLUCOSE: 47 MG/DL (ref 70–110)
POIKILOCYTOSIS BLD QL SMEAR: ABNORMAL
POIKILOCYTOSIS BLD QL SMEAR: ABNORMAL
POLYCHROMASIA BLD QL SMEAR: ABNORMAL
POLYCHROMASIA BLD QL SMEAR: ABNORMAL
POTASSIUM BLD-SCNC: 3.2 MMOL/L (ref 3.5–5.1)
POTASSIUM SERPL-SCNC: 2.9 MMOL/L
POTASSIUM SERPL-SCNC: 3.2 MMOL/L
POTASSIUM SERPL-SCNC: 3.5 MMOL/L
PROT SERPL-MCNC: 3.8 G/DL
PROT SERPL-MCNC: 4.1 G/DL
PROT SERPL-MCNC: 4.4 G/DL
RBC # BLD AUTO: 3.35 M/UL
RBC # BLD AUTO: 3.35 M/UL
RBC # BLD AUTO: 3.36 M/UL
RBC # BLD AUTO: 3.75 M/UL
SAMPLE: ABNORMAL
SCHISTOCYTES BLD QL SMEAR: ABNORMAL
SCHISTOCYTES BLD QL SMEAR: ABNORMAL
SCHISTOCYTES BLD QL SMEAR: PRESENT
SCHISTOCYTES BLD QL SMEAR: PRESENT
SITE: ABNORMAL
SODIUM BLD-SCNC: 140 MMOL/L (ref 136–145)
SODIUM SERPL-SCNC: 134 MMOL/L
SODIUM SERPL-SCNC: 135 MMOL/L
SODIUM SERPL-SCNC: 135 MMOL/L
SP02: 100
SP02: 99
SP02: 99
SPONT RATE: 17
SPONT RATE: 18
SPONT RATE: 22
TARGETS BLD QL SMEAR: ABNORMAL
TARGETS BLD QL SMEAR: ABNORMAL
TROPONIN I SERPL DL<=0.01 NG/ML-MCNC: 0.03 NG/ML
TROPONIN I SERPL DL<=0.01 NG/ML-MCNC: 0.06 NG/ML
WBC # BLD AUTO: 11.13 K/UL
WBC # BLD AUTO: 17.77 K/UL
WBC # BLD AUTO: 7.81 K/UL
WBC # BLD AUTO: 7.81 K/UL

## 2019-01-25 PROCEDURE — 12000002 HC ACUTE/MED SURGE SEMI-PRIVATE ROOM

## 2019-01-25 PROCEDURE — 93005 ELECTROCARDIOGRAM TRACING: CPT | Performed by: INTERNAL MEDICINE

## 2019-01-25 PROCEDURE — 36620 INSERTION CATHETER ARTERY: CPT | Mod: 59,,, | Performed by: ANESTHESIOLOGY

## 2019-01-25 PROCEDURE — 87076 CULTURE ANAEROBE IDENT EACH: CPT | Mod: 59

## 2019-01-25 PROCEDURE — 36556 INSERT NON-TUNNEL CV CATH: CPT | Performed by: SURGERY

## 2019-01-25 PROCEDURE — 37000009 HC ANESTHESIA EA ADD 15 MINS: Performed by: SURGERY

## 2019-01-25 PROCEDURE — 37000008 HC ANESTHESIA 1ST 15 MINUTES: Performed by: SURGERY

## 2019-01-25 PROCEDURE — 82330 ASSAY OF CALCIUM: CPT

## 2019-01-25 PROCEDURE — 80053 COMPREHEN METABOLIC PANEL: CPT

## 2019-01-25 PROCEDURE — 87116 MYCOBACTERIA CULTURE: CPT | Mod: 59

## 2019-01-25 PROCEDURE — 27000190 HC CPAP FULL FACE MASK W/VALVE

## 2019-01-25 PROCEDURE — 82962 GLUCOSE BLOOD TEST: CPT | Performed by: SURGERY

## 2019-01-25 PROCEDURE — 25000003 PHARM REV CODE 250: Performed by: ANESTHESIOLOGY

## 2019-01-25 PROCEDURE — 25000003 PHARM REV CODE 250: Performed by: STUDENT IN AN ORGANIZED HEALTH CARE EDUCATION/TRAINING PROGRAM

## 2019-01-25 PROCEDURE — 25000003 PHARM REV CODE 250: Performed by: PHYSICIAN ASSISTANT

## 2019-01-25 PROCEDURE — 83735 ASSAY OF MAGNESIUM: CPT | Mod: 91

## 2019-01-25 PROCEDURE — 63600175 PHARM REV CODE 636 W HCPCS: Performed by: PHYSICIAN ASSISTANT

## 2019-01-25 PROCEDURE — 97605 NEG PRS WND THER DME<=50SQCM: CPT | Mod: ,,, | Performed by: SURGERY

## 2019-01-25 PROCEDURE — 20000000 HC ICU ROOM

## 2019-01-25 PROCEDURE — 83605 ASSAY OF LACTIC ACID: CPT | Mod: 91

## 2019-01-25 PROCEDURE — C1751 CATH, INF, PER/CENT/MIDLINE: HCPCS | Performed by: NURSE ANESTHETIST, CERTIFIED REGISTERED

## 2019-01-25 PROCEDURE — 88305 TISSUE SPECIMEN TO PATHOLOGY - SURGERY: ICD-10-PCS | Mod: 26,,, | Performed by: PATHOLOGY

## 2019-01-25 PROCEDURE — 94660 CPAP INITIATION&MGMT: CPT

## 2019-01-25 PROCEDURE — 49020 DRAINAGE ABDOM ABSCESS OPEN: CPT | Mod: ,,, | Performed by: SURGERY

## 2019-01-25 PROCEDURE — 99900035 HC TECH TIME PER 15 MIN (STAT)

## 2019-01-25 PROCEDURE — 87205 SMEAR GRAM STAIN: CPT

## 2019-01-25 PROCEDURE — 84132 ASSAY OF SERUM POTASSIUM: CPT

## 2019-01-25 PROCEDURE — C1729 CATH, DRAINAGE: HCPCS | Performed by: SURGERY

## 2019-01-25 PROCEDURE — D9220A PRA ANESTHESIA: ICD-10-PCS | Mod: ANES,,, | Performed by: ANESTHESIOLOGY

## 2019-01-25 PROCEDURE — 86920 COMPATIBILITY TEST SPIN: CPT

## 2019-01-25 PROCEDURE — 27201037 HC PRESSURE MONITORING SET UP

## 2019-01-25 PROCEDURE — 84100 ASSAY OF PHOSPHORUS: CPT | Mod: 91

## 2019-01-25 PROCEDURE — 97605 PR NEG PRESS WOUND THERAPY (NPWT) W/NON-DISPOSABLE WOUND VAC DEVICE (DME), <=50 CM: ICD-10-PCS | Mod: ,,, | Performed by: SURGERY

## 2019-01-25 PROCEDURE — 85014 HEMATOCRIT: CPT

## 2019-01-25 PROCEDURE — 63600175 PHARM REV CODE 636 W HCPCS: Performed by: NURSE ANESTHETIST, CERTIFIED REGISTERED

## 2019-01-25 PROCEDURE — 93010 ELECTROCARDIOGRAM REPORT: CPT | Mod: ,,, | Performed by: INTERNAL MEDICINE

## 2019-01-25 PROCEDURE — D9220A PRA ANESTHESIA: Mod: ANES,,, | Performed by: ANESTHESIOLOGY

## 2019-01-25 PROCEDURE — 88305 TISSUE EXAM BY PATHOLOGIST: CPT | Performed by: PATHOLOGY

## 2019-01-25 PROCEDURE — 27800505 HC CATH, RADIAL ARTERY KIT: Performed by: NURSE ANESTHETIST, CERTIFIED REGISTERED

## 2019-01-25 PROCEDURE — 87075 CULTR BACTERIA EXCEPT BLOOD: CPT | Mod: 59

## 2019-01-25 PROCEDURE — 80202 ASSAY OF VANCOMYCIN: CPT

## 2019-01-25 PROCEDURE — D9220A PRA ANESTHESIA: Mod: CRNA,,, | Performed by: NURSE ANESTHETIST, CERTIFIED REGISTERED

## 2019-01-25 PROCEDURE — 63600175 PHARM REV CODE 636 W HCPCS: Performed by: STUDENT IN AN ORGANIZED HEALTH CARE EDUCATION/TRAINING PROGRAM

## 2019-01-25 PROCEDURE — 25000003 PHARM REV CODE 250: Performed by: NURSE ANESTHETIST, CERTIFIED REGISTERED

## 2019-01-25 PROCEDURE — 37799 UNLISTED PX VASCULAR SURGERY: CPT

## 2019-01-25 PROCEDURE — 93010 EKG 12-LEAD: ICD-10-PCS | Mod: ,,, | Performed by: INTERNAL MEDICINE

## 2019-01-25 PROCEDURE — 87015 SPECIMEN INFECT AGNT CONCNTJ: CPT | Mod: 59

## 2019-01-25 PROCEDURE — 36000707: Performed by: SURGERY

## 2019-01-25 PROCEDURE — 82803 BLOOD GASES ANY COMBINATION: CPT

## 2019-01-25 PROCEDURE — 25000003 PHARM REV CODE 250: Performed by: SURGERY

## 2019-01-25 PROCEDURE — 36000706: Performed by: SURGERY

## 2019-01-25 PROCEDURE — 71000039 HC RECOVERY, EACH ADD'L HOUR: Performed by: SURGERY

## 2019-01-25 PROCEDURE — 25000242 PHARM REV CODE 250 ALT 637 W/ HCPCS

## 2019-01-25 PROCEDURE — 25000003 PHARM REV CODE 250

## 2019-01-25 PROCEDURE — 94640 AIRWAY INHALATION TREATMENT: CPT

## 2019-01-25 PROCEDURE — 87206 SMEAR FLUORESCENT/ACID STAI: CPT

## 2019-01-25 PROCEDURE — D9220A PRA ANESTHESIA: ICD-10-PCS | Mod: CRNA,,, | Performed by: NURSE ANESTHETIST, CERTIFIED REGISTERED

## 2019-01-25 PROCEDURE — 25000242 PHARM REV CODE 250 ALT 637 W/ HCPCS: Performed by: PHYSICIAN ASSISTANT

## 2019-01-25 PROCEDURE — 27000221 HC OXYGEN, UP TO 24 HOURS

## 2019-01-25 PROCEDURE — 84484 ASSAY OF TROPONIN QUANT: CPT

## 2019-01-25 PROCEDURE — 87070 CULTURE OTHR SPECIMN AEROBIC: CPT

## 2019-01-25 PROCEDURE — 85347 COAGULATION TIME ACTIVATED: CPT

## 2019-01-25 PROCEDURE — 71000033 HC RECOVERY, INTIAL HOUR: Performed by: SURGERY

## 2019-01-25 PROCEDURE — 88305 TISSUE EXAM BY PATHOLOGIST: CPT | Mod: 26,,, | Performed by: PATHOLOGY

## 2019-01-25 PROCEDURE — 49020 PR DRAIN ABD ABSCESS OPEN: ICD-10-PCS | Mod: ,,, | Performed by: SURGERY

## 2019-01-25 PROCEDURE — 36415 COLL VENOUS BLD VENIPUNCTURE: CPT

## 2019-01-25 PROCEDURE — 86850 RBC ANTIBODY SCREEN: CPT

## 2019-01-25 PROCEDURE — 87040 BLOOD CULTURE FOR BACTERIA: CPT

## 2019-01-25 PROCEDURE — 84295 ASSAY OF SERUM SODIUM: CPT

## 2019-01-25 PROCEDURE — 27100025 HC TUBING, SET FLUID WARMER: Performed by: NURSE ANESTHETIST, CERTIFIED REGISTERED

## 2019-01-25 PROCEDURE — 63600175 PHARM REV CODE 636 W HCPCS

## 2019-01-25 PROCEDURE — 94761 N-INVAS EAR/PLS OXIMETRY MLT: CPT

## 2019-01-25 PROCEDURE — 63600175 PHARM REV CODE 636 W HCPCS: Performed by: SURGERY

## 2019-01-25 PROCEDURE — 36620 PR INSERT CATH,ART,PERCUT,SHORTTERM: ICD-10-PCS | Mod: 59,,, | Performed by: ANESTHESIOLOGY

## 2019-01-25 PROCEDURE — 84484 ASSAY OF TROPONIN QUANT: CPT | Mod: 91

## 2019-01-25 PROCEDURE — 85025 COMPLETE CBC W/AUTO DIFF WBC: CPT

## 2019-01-25 PROCEDURE — 25000242 PHARM REV CODE 250 ALT 637 W/ HCPCS: Performed by: SURGERY

## 2019-01-25 PROCEDURE — 87102 FUNGUS ISOLATION CULTURE: CPT | Mod: 59

## 2019-01-25 RX ORDER — ETOMIDATE 2 MG/ML
INJECTION INTRAVENOUS
Status: DISCONTINUED | OUTPATIENT
Start: 2019-01-25 | End: 2019-01-25

## 2019-01-25 RX ORDER — POTASSIUM CHLORIDE 750 MG/1
50 CAPSULE, EXTENDED RELEASE ORAL ONCE
Status: DISCONTINUED | OUTPATIENT
Start: 2019-01-25 | End: 2019-01-28

## 2019-01-25 RX ORDER — IPRATROPIUM BROMIDE AND ALBUTEROL SULFATE 2.5; .5 MG/3ML; MG/3ML
3 SOLUTION RESPIRATORY (INHALATION) EVERY 6 HOURS
Status: DISCONTINUED | OUTPATIENT
Start: 2019-01-25 | End: 2019-02-05

## 2019-01-25 RX ORDER — PHENYLEPHRINE HCL IN 0.9% NACL 20MG/250ML
0.5 PLASTIC BAG, INJECTION (ML) INTRAVENOUS CONTINUOUS
Status: DISCONTINUED | OUTPATIENT
Start: 2019-01-25 | End: 2019-01-26

## 2019-01-25 RX ORDER — MAGNESIUM SULFATE HEPTAHYDRATE 40 MG/ML
2 INJECTION, SOLUTION INTRAVENOUS ONCE
Status: COMPLETED | OUTPATIENT
Start: 2019-01-25 | End: 2019-01-26

## 2019-01-25 RX ORDER — SODIUM CHLORIDE 0.9 % (FLUSH) 0.9 %
5 SYRINGE (ML) INJECTION
Status: DISCONTINUED | OUTPATIENT
Start: 2019-01-25 | End: 2019-03-16 | Stop reason: HOSPADM

## 2019-01-25 RX ORDER — PHENYLEPHRINE HCL IN 0.9% NACL 1 MG/10 ML
SYRINGE (ML) INTRAVENOUS
Status: DISPENSED
Start: 2019-01-25 | End: 2019-01-26

## 2019-01-25 RX ORDER — ALBUTEROL SULFATE 2.5 MG/.5ML
2.5 SOLUTION RESPIRATORY (INHALATION) EVERY 4 HOURS PRN
Status: DISCONTINUED | OUTPATIENT
Start: 2019-01-25 | End: 2019-01-25

## 2019-01-25 RX ORDER — ONDANSETRON 2 MG/ML
4 INJECTION INTRAMUSCULAR; INTRAVENOUS EVERY 8 HOURS PRN
Status: DISCONTINUED | OUTPATIENT
Start: 2019-01-25 | End: 2019-03-16 | Stop reason: HOSPADM

## 2019-01-25 RX ORDER — SCOLOPAMINE TRANSDERMAL SYSTEM 1 MG/1
1 PATCH, EXTENDED RELEASE TRANSDERMAL
Status: DISCONTINUED | OUTPATIENT
Start: 2019-01-25 | End: 2019-01-31

## 2019-01-25 RX ORDER — ENOXAPARIN SODIUM 100 MG/ML
40 INJECTION SUBCUTANEOUS EVERY 24 HOURS
Status: DISCONTINUED | OUTPATIENT
Start: 2019-01-25 | End: 2019-01-26

## 2019-01-25 RX ORDER — SODIUM CHLORIDE 9 MG/ML
INJECTION, SOLUTION INTRAVENOUS CONTINUOUS PRN
Status: DISCONTINUED | OUTPATIENT
Start: 2019-01-25 | End: 2019-01-25

## 2019-01-25 RX ORDER — PHENYLEPHRINE HCL IN 0.9% NACL 1 MG/10 ML
SYRINGE (ML) INTRAVENOUS
Status: COMPLETED
Start: 2019-01-25 | End: 2019-01-25

## 2019-01-25 RX ORDER — SODIUM BICARBONATE 1 MEQ/ML
50 SYRINGE (ML) INTRAVENOUS ONCE
Status: COMPLETED | OUTPATIENT
Start: 2019-01-26 | End: 2019-01-26

## 2019-01-25 RX ORDER — HYDROMORPHONE HYDROCHLORIDE 1 MG/ML
0.5 INJECTION, SOLUTION INTRAMUSCULAR; INTRAVENOUS; SUBCUTANEOUS EVERY 6 HOURS PRN
Status: DISCONTINUED | OUTPATIENT
Start: 2019-01-25 | End: 2019-01-26

## 2019-01-25 RX ORDER — IPRATROPIUM BROMIDE AND ALBUTEROL SULFATE 2.5; .5 MG/3ML; MG/3ML
SOLUTION RESPIRATORY (INHALATION)
Status: COMPLETED
Start: 2019-01-25 | End: 2019-01-25

## 2019-01-25 RX ORDER — MIDAZOLAM HYDROCHLORIDE 1 MG/ML
INJECTION, SOLUTION INTRAMUSCULAR; INTRAVENOUS
Status: DISCONTINUED | OUTPATIENT
Start: 2019-01-25 | End: 2019-01-25

## 2019-01-25 RX ORDER — ACETAMINOPHEN 10 MG/ML
1000 INJECTION, SOLUTION INTRAVENOUS ONCE
Status: COMPLETED | OUTPATIENT
Start: 2019-01-25 | End: 2019-01-25

## 2019-01-25 RX ORDER — HYDROMORPHONE HCL IN 0.9% NACL 6 MG/30 ML
PATIENT CONTROLLED ANALGESIA SYRINGE INTRAVENOUS CONTINUOUS
Status: DISCONTINUED | OUTPATIENT
Start: 2019-01-25 | End: 2019-01-25

## 2019-01-25 RX ORDER — HYDROMORPHONE HYDROCHLORIDE 1 MG/ML
0.2 INJECTION, SOLUTION INTRAMUSCULAR; INTRAVENOUS; SUBCUTANEOUS EVERY 6 HOURS PRN
Status: DISCONTINUED | OUTPATIENT
Start: 2019-01-25 | End: 2019-01-26

## 2019-01-25 RX ORDER — FENTANYL CITRATE 50 UG/ML
INJECTION, SOLUTION INTRAMUSCULAR; INTRAVENOUS
Status: DISCONTINUED | OUTPATIENT
Start: 2019-01-25 | End: 2019-01-25

## 2019-01-25 RX ORDER — ALBUTEROL SULFATE 2.5 MG/.5ML
2.5 SOLUTION RESPIRATORY (INHALATION) EVERY 4 HOURS PRN
Status: DISCONTINUED | OUTPATIENT
Start: 2019-01-25 | End: 2019-02-19

## 2019-01-25 RX ORDER — NALOXONE HCL 0.4 MG/ML
0.02 VIAL (ML) INJECTION
Status: DISCONTINUED | OUTPATIENT
Start: 2019-01-25 | End: 2019-01-25

## 2019-01-25 RX ORDER — IPRATROPIUM BROMIDE AND ALBUTEROL SULFATE 2.5; .5 MG/3ML; MG/3ML
3 SOLUTION RESPIRATORY (INHALATION) ONCE
Status: DISCONTINUED | OUTPATIENT
Start: 2019-01-25 | End: 2019-01-25

## 2019-01-25 RX ORDER — LIDOCAINE HCL/PF 100 MG/5ML
SYRINGE (ML) INTRAVENOUS
Status: DISCONTINUED | OUTPATIENT
Start: 2019-01-25 | End: 2019-01-25

## 2019-01-25 RX ORDER — ONDANSETRON 2 MG/ML
INJECTION INTRAMUSCULAR; INTRAVENOUS
Status: DISCONTINUED | OUTPATIENT
Start: 2019-01-25 | End: 2019-01-25

## 2019-01-25 RX ORDER — SUCCINYLCHOLINE CHLORIDE 20 MG/ML
INJECTION INTRAMUSCULAR; INTRAVENOUS
Status: DISCONTINUED | OUTPATIENT
Start: 2019-01-25 | End: 2019-01-25

## 2019-01-25 RX ORDER — IPRATROPIUM BROMIDE AND ALBUTEROL SULFATE 2.5; .5 MG/3ML; MG/3ML
3 SOLUTION RESPIRATORY (INHALATION) ONCE
Status: COMPLETED | OUTPATIENT
Start: 2019-01-25 | End: 2019-01-25

## 2019-01-25 RX ORDER — IPRATROPIUM BROMIDE AND ALBUTEROL SULFATE 2.5; .5 MG/3ML; MG/3ML
3 SOLUTION RESPIRATORY (INHALATION) EVERY 6 HOURS
Status: DISCONTINUED | OUTPATIENT
Start: 2019-01-25 | End: 2019-01-25

## 2019-01-25 RX ORDER — PHENYLEPHRINE HYDROCHLORIDE 10 MG/ML
INJECTION INTRAVENOUS
Status: COMPLETED
Start: 2019-01-25 | End: 2019-01-25

## 2019-01-25 RX ORDER — ALBUTEROL SULFATE 2.5 MG/.5ML
SOLUTION RESPIRATORY (INHALATION)
Status: COMPLETED
Start: 2019-01-25 | End: 2019-01-25

## 2019-01-25 RX ORDER — PHENYLEPHRINE HYDROCHLORIDE 10 MG/ML
INJECTION INTRAVENOUS
Status: DISPENSED
Start: 2019-01-25 | End: 2019-01-26

## 2019-01-25 RX ORDER — ALBUTEROL SULFATE 2.5 MG/.5ML
2.5 SOLUTION RESPIRATORY (INHALATION) ONCE
Status: COMPLETED | OUTPATIENT
Start: 2019-01-25 | End: 2019-01-25

## 2019-01-25 RX ORDER — POTASSIUM CHLORIDE 14.9 MG/ML
INJECTION INTRAVENOUS CONTINUOUS PRN
Status: DISCONTINUED | OUTPATIENT
Start: 2019-01-25 | End: 2019-01-25

## 2019-01-25 RX ORDER — ROCURONIUM BROMIDE 10 MG/ML
INJECTION, SOLUTION INTRAVENOUS
Status: DISCONTINUED | OUTPATIENT
Start: 2019-01-25 | End: 2019-01-25

## 2019-01-25 RX ORDER — PHENYLEPHRINE HYDROCHLORIDE 10 MG/ML
INJECTION INTRAVENOUS
Status: DISCONTINUED | OUTPATIENT
Start: 2019-01-25 | End: 2019-01-25

## 2019-01-25 RX ORDER — SODIUM BICARBONATE 1 MEQ/ML
50 SYRINGE (ML) INTRAVENOUS ONCE
Status: COMPLETED | OUTPATIENT
Start: 2019-01-25 | End: 2019-01-25

## 2019-01-25 RX ADMIN — PHENYLEPHRINE HYDROCHLORIDE 100 MCG: 10 INJECTION INTRAVENOUS at 09:01

## 2019-01-25 RX ADMIN — FENTANYL CITRATE 25 MCG: 50 INJECTION, SOLUTION INTRAMUSCULAR; INTRAVENOUS at 09:01

## 2019-01-25 RX ADMIN — VANCOMYCIN HYDROCHLORIDE 1000 MG: 1 INJECTION, POWDER, FOR SOLUTION INTRAVENOUS at 01:01

## 2019-01-25 RX ADMIN — SUGAMMADEX 200 MG: 100 INJECTION, SOLUTION INTRAVENOUS at 10:01

## 2019-01-25 RX ADMIN — FENTANYL CITRATE 25 MCG: 50 INJECTION, SOLUTION INTRAMUSCULAR; INTRAVENOUS at 10:01

## 2019-01-25 RX ADMIN — ONDANSETRON 4 MG: 2 INJECTION INTRAMUSCULAR; INTRAVENOUS at 12:01

## 2019-01-25 RX ADMIN — VANCOMYCIN HYDROCHLORIDE 1000 MG: 1 INJECTION, POWDER, FOR SOLUTION INTRAVENOUS at 10:01

## 2019-01-25 RX ADMIN — POTASSIUM CHLORIDE: 14.9 INJECTION, SOLUTION INTRAVENOUS at 10:01

## 2019-01-25 RX ADMIN — PHENYLEPHRINE HYDROCHLORIDE: 10 INJECTION INTRAVENOUS at 11:01

## 2019-01-25 RX ADMIN — ALBUTEROL SULFATE 2.5 MG: 2.5 SOLUTION RESPIRATORY (INHALATION) at 11:01

## 2019-01-25 RX ADMIN — ONDANSETRON 4 MG: 2 INJECTION INTRAMUSCULAR; INTRAVENOUS at 10:01

## 2019-01-25 RX ADMIN — SODIUM CHLORIDE, SODIUM LACTATE, POTASSIUM CHLORIDE, AND CALCIUM CHLORIDE 1000 ML: .6; .31; .03; .02 INJECTION, SOLUTION INTRAVENOUS at 07:01

## 2019-01-25 RX ADMIN — PIPERACILLIN AND TAZOBACTAM 4.5 G: 4; .5 INJECTION, POWDER, LYOPHILIZED, FOR SOLUTION INTRAVENOUS; PARENTERAL at 04:01

## 2019-01-25 RX ADMIN — PHENYLEPHRINE HYDROCHLORIDE 200 MCG: 10 INJECTION INTRAVENOUS at 09:01

## 2019-01-25 RX ADMIN — SODIUM CHLORIDE, SODIUM LACTATE, POTASSIUM CHLORIDE, AND CALCIUM CHLORIDE 1000 ML: .6; .31; .03; .02 INJECTION, SOLUTION INTRAVENOUS at 01:01

## 2019-01-25 RX ADMIN — ETOMIDATE 20 MG: 2 INJECTION, SOLUTION INTRAVENOUS at 09:01

## 2019-01-25 RX ADMIN — DEXTROSE MONOHYDRATE 12.5 G: 25 INJECTION, SOLUTION INTRAVENOUS at 08:01

## 2019-01-25 RX ADMIN — Medication 1 MG: at 06:01

## 2019-01-25 RX ADMIN — ENOXAPARIN SODIUM 40 MG: 100 INJECTION SUBCUTANEOUS at 05:01

## 2019-01-25 RX ADMIN — IPRATROPIUM BROMIDE AND ALBUTEROL SULFATE 3 ML: .5; 3 SOLUTION RESPIRATORY (INHALATION) at 01:01

## 2019-01-25 RX ADMIN — PIPERACILLIN AND TAZOBACTAM 4.5 G: 4; .5 INJECTION, POWDER, LYOPHILIZED, FOR SOLUTION INTRAVENOUS; PARENTERAL at 10:01

## 2019-01-25 RX ADMIN — IPRATROPIUM BROMIDE AND ALBUTEROL SULFATE 3 ML: .5; 3 SOLUTION RESPIRATORY (INHALATION) at 08:01

## 2019-01-25 RX ADMIN — Medication 100 MCG: at 01:01

## 2019-01-25 RX ADMIN — DEXTROSE MONOHYDRATE 5 G: 25 INJECTION, SOLUTION INTRAVENOUS at 10:01

## 2019-01-25 RX ADMIN — KETOROLAC TROMETHAMINE 15 MG: 30 INJECTION, SOLUTION INTRAMUSCULAR at 02:01

## 2019-01-25 RX ADMIN — SODIUM CHLORIDE: 9 INJECTION, SOLUTION INTRAVENOUS at 09:01

## 2019-01-25 RX ADMIN — SODIUM CHLORIDE 2000 ML: 0.9 INJECTION, SOLUTION INTRAVENOUS at 09:01

## 2019-01-25 RX ADMIN — SODIUM BICARBONATE 50 MEQ: 84 INJECTION INTRAVENOUS at 04:01

## 2019-01-25 RX ADMIN — SODIUM CHLORIDE, SODIUM GLUCONATE, SODIUM ACETATE, POTASSIUM CHLORIDE, MAGNESIUM CHLORIDE, SODIUM PHOSPHATE, DIBASIC, AND POTASSIUM PHOSPHATE: .53; .5; .37; .037; .03; .012; .00082 INJECTION, SOLUTION INTRAVENOUS at 09:01

## 2019-01-25 RX ADMIN — PHENYLEPHRINE HYDROCHLORIDE 1 MCG/KG/MIN: 10 INJECTION INTRAVENOUS at 07:01

## 2019-01-25 RX ADMIN — IPRATROPIUM BROMIDE AND ALBUTEROL SULFATE 3 ML: 2.5; .5 SOLUTION RESPIRATORY (INHALATION) at 11:01

## 2019-01-25 RX ADMIN — ONDANSETRON 4 MG: 2 INJECTION INTRAMUSCULAR; INTRAVENOUS at 06:01

## 2019-01-25 RX ADMIN — IPRATROPIUM BROMIDE AND ALBUTEROL SULFATE 3 ML: .5; 3 SOLUTION RESPIRATORY (INHALATION) at 09:01

## 2019-01-25 RX ADMIN — SODIUM CHLORIDE, SODIUM LACTATE, POTASSIUM CHLORIDE, AND CALCIUM CHLORIDE 1000 ML: .6; .31; .03; .02 INJECTION, SOLUTION INTRAVENOUS at 03:01

## 2019-01-25 RX ADMIN — MIDAZOLAM HYDROCHLORIDE 2 MG: 1 INJECTION, SOLUTION INTRAMUSCULAR; INTRAVENOUS at 09:01

## 2019-01-25 RX ADMIN — SODIUM CHLORIDE, SODIUM LACTATE, POTASSIUM CHLORIDE, AND CALCIUM CHLORIDE 1000 ML: .6; .31; .03; .02 INJECTION, SOLUTION INTRAVENOUS at 05:01

## 2019-01-25 RX ADMIN — ROCURONIUM BROMIDE 30 MG: 10 INJECTION, SOLUTION INTRAVENOUS at 09:01

## 2019-01-25 RX ADMIN — PIPERACILLIN AND TAZOBACTAM 4.5 G: 4; .5 INJECTION, POWDER, LYOPHILIZED, FOR SOLUTION INTRAVENOUS; PARENTERAL at 12:01

## 2019-01-25 RX ADMIN — LIDOCAINE HYDROCHLORIDE 60 MG: 20 INJECTION, SOLUTION INTRAVENOUS at 09:01

## 2019-01-25 RX ADMIN — Medication: at 02:01

## 2019-01-25 RX ADMIN — SUCCINYLCHOLINE CHLORIDE 140 MG: 20 INJECTION, SOLUTION INTRAMUSCULAR; INTRAVENOUS at 09:01

## 2019-01-25 RX ADMIN — IPRATROPIUM BROMIDE AND ALBUTEROL SULFATE 3 ML: .5; 3 SOLUTION RESPIRATORY (INHALATION) at 11:01

## 2019-01-25 RX ADMIN — ALBUTEROL SULFATE 2.5 MG: 2.5 SOLUTION RESPIRATORY (INHALATION) at 03:01

## 2019-01-25 RX ADMIN — ACETAMINOPHEN 1000 MG: 10 INJECTION, SOLUTION INTRAVENOUS at 12:01

## 2019-01-25 NOTE — PT/OT/SLP PROGRESS
Occupational Therapy      Patient Name:  Sheryl Martines   MRN:  [id number]    Patient not seen today secondary to pt at surgery.    . Will follow-up as able.    Hawa Bocanegra, OT  1/25/2019

## 2019-01-25 NOTE — H&P
[facility]  Critical Care - Surgery  History & Physical    Patient Name: Sheryl Martines  MRN: [id number]  Admission Date: 1/23/2019  Code Status: Full Code  Attending Physician: Monster Laurent MD   Primary Care Provider: Primary Doctor No   Principal Problem: Pelvic abscess in female    Subjective:     HPI:  Sheryl Martines is a 63 y.o. female with hx of asthma, COPD, CAD, DM, HTN, CVA 2012 with R side deficits, HFpEF, severe debility, lap converted to open appendectomy 8/2018 complicated by cdiff, failure to thrive.  Hx of difficult intubation and delayed emergence, CO2 narcosis needing ICU stay.  Hx of PRN O2 at night.  Denies smoking. Patient was admitted on 1/23/2019 via ED for abd pain, N/V, decreased appetite.  Lactic acid 6.8, with tachycardia and W of 13 (severe sepsis).  CT shows fluid collection in R hemipelvis.  Started on vanc and zosyn. Patient now s/p exploratory laparotomy, washout, and pelvic abscess drainage on 1/25/19.    Hospital/ICU Course:  No notes on file    Follow-up For: Procedure(s) (LRB):  LAPAROTOMY, EXPLORATORY (N/A)  INCISION AND DRAINAGE, ABSCESS-  drainage of pelvic abscess (N/A)  EXCISION, ABSCESS- drainage abdominal wall abscess (N/A)  APPLICATION, WOUND VAC (N/A)    Post-Operative Day: Day of Surgery     Past Medical History:   Diagnosis Date    Abnormal LFTs 12/14/2018    Asthma     Closed compression fracture of fourth lumbar vertebra     COPD (chronic obstructive pulmonary disease)     Coronary artery disease     Diabetes mellitus     Fall 10/4/2018    Fracture     right tib & fib    Glaucoma     High cholesterol     Hypertension     Infected incision 9/20/2018    Intractable nausea and vomiting 1/23/2019    Iritis     Pulmonary embolus     S/P appendectomy 9/11/2018    Stroke     rt sided weakness.       Past Surgical History:   Procedure Laterality Date    ABDOMINAL SURGERY      APPENDECTOMY N/A 8/26/2018    Performed by [facility]adine  [facility]    APPENDECTOMY, LAPAROSCOPIC---CONVERTED TO OPEN APPENDECTOMY @0950 N/A 8/26/2018    [facility] OR    BACK SURGERY      stimulator    CATARACT EXTRACTION      HYSTERECTOMY      TOTAL HIP ARTHROPLASTY Left     2008       Review of patient's allergies indicates:   Allergen Reactions    Ace inhibitors Swelling    Carvedilol      Other reaction(s): Other (See Comments)  blister    Hydralazine analogues     Metformin Nausea And Vomiting    Tetracycline Itching    Tetracyclines Swelling    Travatan (with benzalkonium) [travoprost (benzalkonium)]     Travoprost Itching     Other reaction(s): Other (See Comments)  Blurry vision       Family History     Problem Relation (Age of Onset)    Cancer Father    Diabetes Brother    Lupus Sister    Multiple sclerosis Mother        Tobacco Use    Smoking status: Never Smoker    Smokeless tobacco: Never Used   Substance and Sexual Activity    Alcohol use: No     Frequency: Never    Drug use: No    Sexual activity: No     Partners: Male      Review of Systems   Unable to perform ROS: Acuity of condition     Objective:     Vital Signs (Most Recent):  Temp: 97.8 °F (36.6 °C) (01/25/19 1400)  Pulse: 107 (01/25/19 1533)  Resp: 20 (01/25/19 1533)  BP: (!) 82/50 (01/25/19 1515)  SpO2: 99 % (01/25/19 1515) Vital Signs (24h Range):  Temp:  [97 °F (36.1 °C)-98 °F (36.7 °C)] 97.8 °F (36.6 °C)  Pulse:  [] 107  Resp:  [16-23] 20  SpO2:  [93 %-100 %] 99 %  BP: ()/(41-88) 82/50  Arterial Line BP: ()/(45-62) 81/46     Weight: 72 kg (158 lb 11.7 oz)  Body mass index is 29.99 kg/m².      Intake/Output Summary (Last 24 hours) at 1/25/2019 1605  Last data filed at 1/25/2019 1144  Gross per 24 hour   Intake 2380 ml   Output 85 ml   Net 2295 ml       Physical Exam   Constitutional: She appears well-developed and well-nourished. She appears distressed.   HENT:   Head: Normocephalic.   Eyes: Pupils are equal,  round, and reactive to light.   Cardiovascular: Regular rhythm.   Tachycardic   Pulmonary/Chest: No respiratory distress. She has no wheezes.   On Bipap   Abdominal: Soft.   Neurological:   lethargic   Skin: Skin is warm and dry.       Vents:       Lines/Drains/Airways     Central Venous Catheter Line                 Port A Cath Single Lumen 01/23/19 1400 right subclavian 2 days          Drain                 Closed/Suction Drain 01/25/19 1020 Right;Ventral Abdomen Bulb 19 Fr. less than 1 day         Urethral Catheter 01/25/19 0934 Straight-tip;Non-latex 16 Fr. less than 1 day          Arterial Line                 Arterial Line 01/25/19 1049 Left Radial less than 1 day          Peripheral Intravenous Line                 Peripheral IV - Single Lumen 01/25/19 0950 Left Antecubital less than 1 day                Significant Labs:    CBC/Anemia Profile:  Recent Labs   Lab 01/24/19  0832 01/24/19  0833 01/24/19  1502 01/25/19  0408 01/25/19  1050   WBC  --   --  12.04 11.13 17.77*   HGB  --   --  9.4* 8.5* 9.4*   HCT  --   --  27.6* 25.4* 28.9*   PLT  --   --  183 185 200   MCV  --   --  76* 76* 77*   RDW  --   --  18.1* 17.6* 18.5*   IRON 53  --   --   --   --    FERRITIN 990*  --   --   --   --    FOLATE  --  2.1*  --   --   --    [id number]  --  1523*  --   --   --         Chemistries:  Recent Labs   Lab 01/24/19  1502 01/25/19  0408 01/25/19  1050    134* 135*   K 3.2* 2.9* 3.5    104 107   CO2 20* 22* 20*   BUN 4* 3* 2*   CREATININE 0.7 0.7 0.7   CALCIUM 8.5* 8.2* 7.9*   ALBUMIN 1.9* 1.7* 1.8*   PROT 4.6* 4.1* 4.4*   BILITOT 1.8* 1.5* 1.6*   ALKPHOS 360* 314* 341*   ALT 19 14 14   AST 80* 45* 44*   MG 1.6 2.1 1.9   PHOS 2.0* 1.7* 2.1*       All pertinent labs within the past 24 hours have been reviewed.    Significant Imaging: I have reviewed all pertinent imaging results/findings within the past 24 hours.    Assessment/Plan:     * Pelvic abscess in female    63 y.o. female with hx of asthma, COPD,  CAD, DM, HTN, CVA 2012 with R side deficits, HFpEF, severe debility, lap converted to open appendectomy 8/2018 complicated by cdiff, failure to thrive.  Hx of difficult intubation and delayed emergence, CO2 narcosis needing ICU stay.  Hx of PRN O2 at night. Patient now s/p exploratory laparotomy, washout, and pelvic abscess drainage on 1/25/19.    Post-operatively, on initial evaluation in PACU, patient hypotensive to 60s/40s, on BiPap. Currently fluid resuscitating with 3rd liter of LR, responding well to phenylephrine, starting phenylephrine infusion (tachycardic to ~118). ABG 7.11/49/116/16, BE -14, on bipap 14/5, 35% FiO2.     Neuro:  Sedation: None  Pain: dilaudid PCA, dilaudid PRN breakthrough    CV:  - on phenylephrine infusion  - tachycardic to ~118  - fluid resuscitation    Pulm:   - Bipap 14/5, 35% FiO2  - ABG  7.11/49/116/16, BE -14  - 50 meq bicarb x1  - serial ABGs  - daily CXR    Renal:  Trend BUN/Cr  Monitor Urine output    ID:  AF  1/24 blood cultures: gram positive cocci in clusters resembling staph  1/25 operative cultures pending  - on vanc/zosyn  - WBC 7.81    FEN/GI:  NPO  Replace lytes PRN  ppi  Post-op lactate 3.9 --> 7.1 (6.8 on 1/24)  Continue fluid resuscitation    Endo:  Patient with IDDM2, on detemir 20u BID at home  Has not required insulin on the floor prior to procedure (hypoglycemic)  Monitor closely, BG q4    Heme:  Trend H&H  Post-op hgb 8.5    Dispo: Cont ICU care   Primary:              Critical care was time spent personally by me on the following activities: development of treatment plan with patient or surrogate and bedside caregivers, discussions with consultants, evaluation of patient's response to treatment, examination of patient, ordering and performing treatments and interventions, ordering and review of laboratory studies, ordering and review of radiographic studies, pulse oximetry, re-evaluation of patient's condition.  This critical care time did not overlap with that  of any other provider or involve time for any procedures.     Price Mackay MD  Critical Care - Surgery  [facility]

## 2019-01-25 NOTE — PT/OT/SLP PROGRESS
Physical Therapy      Patient Name:  Sheryl Martines   MRN:  [id number]    Patient not seen today secondary to (pt. away at surgery). Will follow-up over weekend.    Sudhakar Light, PT   1/25/2019

## 2019-01-25 NOTE — BRIEF OP NOTE
[facility]  Brief Operative Note    SUMMARY     Surgery Date: 1/25/2019     Surgeon(s) and Role:     * Monster Laurent MD - Primary     * Sierra Real MD - Resident - Assisting        Pre-op Diagnosis:  Generalized abdominal pain [R10.84]  Severe sepsis [A41.9, R65.20]    Post-op Diagnosis:  Post-Op Diagnosis Codes:     * Generalized abdominal pain [R10.84]     * Severe sepsis [A41.9, R65.20]    Procedure(s) (LRB):  LAPAROTOMY, EXPLORATORY (N/A)  INCISION AND DRAINAGE, ABSCESS-  drainage of pelvic abscess (N/A)  EXCISION, ABSCESS- drainage abdominal wall abscess (N/A)  APPLICATION, WOUND VAC (N/A)    Anesthesia: General    Description of Procedure: exploratory laparotomy, pelvic abscess identified and drained, 19 dino drain placed in cavity and fecalith removed, abscess above the fascia at RLQ previous transverse incision opened drained and packed, prevena wound vac placed over midline incision    Description of the findings of the procedure: see op note    Estimated Blood Loss: * No values recorded between 1/25/2019  9:51 AM and 1/25/2019 10:49 AM *         Specimens:   Specimen (12h ago, onward)    None

## 2019-01-25 NOTE — TRANSFER OF CARE
"Anesthesia Transfer of Care Note    Patient: Sheryl Martines    Procedure(s) Performed: Procedure(s) (LRB):  LAPAROTOMY, EXPLORATORY (N/A)  INCISION AND DRAINAGE, ABSCESS-  drainage of pelvic abscess (N/A)  EXCISION, ABSCESS- drainage abdominal wall abscess (N/A)  APPLICATION, WOUND VAC (N/A)    Patient location: PACU    Anesthesia Type: general    Transport from OR: Transported from OR on 6-10 L/min O2 by face mask with adequate spontaneous ventilation    Post pain: adequate analgesia    Post assessment: no apparent anesthetic complications and tolerated procedure well    Post vital signs: stable    Level of consciousness: awake and oriented    Nausea/Vomiting: no nausea/vomiting    Complications: none    Transfer of care protocol was followed      Last vitals:   Visit Vitals  BP (!) 187/88   Pulse (!) 128   Temp 36.1 °C (97 °F) (Temporal)   Resp 18   Ht 5' 1" (1.549 m)   Wt 72 kg (158 lb 11.7 oz)   LMP  (LMP Unknown)   SpO2 96%   Breastfeeding? No   BMI 29.99 kg/m²     "

## 2019-01-25 NOTE — OP NOTE
DATE OF PROCEDURE:  01/25/2019    PREOPERATIVE DIAGNOSES:  1.  Pelvic abscess.  2.  Abdominal wall abscess.    POSTOPERATIVE DIAGNOSES:  1.  Pelvic abscess.  2.  Abdominal wall abscess.    PROCEDURES:  1.  Exploratory laparotomy.  2.  Drainage of deep pelvic abscess.  3.  Drainage of abdominal wall abscess.  4.  Application of wound VAC.    SURGEON:  Monster Laurent M.D.    ASSISTANT:  Sierra Real M.D. (RES)    ANESTHESIA:  General.    CLINICAL NOTE:  The patient is a 63-year-old female who is months' status post   open appendectomy for perforated appendicitis at an outside hospital.  She has   developed chronic abdominal pain and sepsis including lactic acidemia as well as   leukocytosis.  She is brought to the operating room for drainage of a deep   pelvic abscess, which is determined to be unapproachable per Interventional   Radiology.  She also has an abdominal wall abscess adjacent to her previous   appendectomy incision.    PROCEDURE NOTE:  Following induction of adequate general anesthesia, a Ritchie   catheter was placed and her abdomen and pelvis were prepped and draped with   ChloraPrep.  We opened her midline incision and extended the wound from her   previous lower midline incision to and above the umbilicus.  We were able to   dissect down to the linea alba, which we incised and then freely entered the   peritoneal cavity.  We took down some adhesions of omentum to the undersurface   of the abdominal wall and we were gradually able to freely enter the peritoneal   cavity.  We dissected into the pelvis and we were able to encounter a low   right-sided pelvic fluid collection, which was grossly purulent.  We cultured   this and aspirated and drained all of the purulence and irrigated thoroughly.    We similarly addressed the abdominal wall abscess in the right lower quadrant.    I aspirated purulence with an 18-gauge needle and we elected to open over the   abscess and drained a significant amount  of purulence.  This was also cultured.    We then irrigated and then placed a closed suction drain in the abscess cavity   in the pelvis and we closed the linea alba with #1 PDS and the skin loosely with   staples.  We applied an onlay wound VAC over the midline wound and a black   sponge over the area of the right abdominal wall and placed these on negative   suction therapy via the wound VAC.  Sterile bulky dressings were then applied   and the procedure was terminated with the patient in stable condition.    ESTIMATED BLOOD LOSS:  30 mL.      MCT/LUCITA  dd: 01/25/2019 13:44:03 (CST)  td: 01/25/2019 15:49:36 (CST)  Doc ID   #[id number]  Job ID #[id number]    CC:

## 2019-01-25 NOTE — PROGRESS NOTES
Anesthesia at bedside to evaluate the pt, LR 1 liter bolus ordered and initiated, will continue to monitor.

## 2019-01-25 NOTE — PROGRESS NOTES
Dr Real at bedside, Lactic acid lab results given to MD, no new orders at present time, will continue to monitor.

## 2019-01-25 NOTE — PROGRESS NOTES
Pt placed back on Bipap due to c/o SOB, will continue to monitor, Dr Mackay at bedside, awaiting new orders.

## 2019-01-25 NOTE — HPI
Sheryl Martines is a 63 y.o. female with a significant PMHx of asthma, COPD, CAD, T2DM, HTN, CVA 2012 with residual right sided deficits, severe debility, lap converted to open appendectomy 08/2018 complicated by C. Diff infection, failure to thrive. Hx of difficult intubation and delayed emergence, hypercapnic respiratory failure needing ICU stay. Hx of PRN O2 at night. Denies smoking. Patient was originally admitted on 01/23/2019 via ED for abd pain, N/V, decreased appetite. Lactic acid at that time found to be 6.8, with tachycardia and WBC of 13. CT showed fluid collection in R hemipelvis. Started on empiric vanc and Zosyn. Patient now s/p exploratory laparotomy, washout, and pelvic abscess drainage on 01/25/19.     Patient stepped down from the SICU on 02/08/19 after significant resuscitation, intubation, and CRRT. While on the floor patient had waxing and waning mental status which would typically improve w/ BiPAP. Over the last 48 hours patient's mentation has continued to worsen. CT head was obtained 02/18/19 which demonstrated no acute processes. Rapid response called on 02/19/19 for continued deterioration of mental status, only withdrawing to pain. Patient was stepped up to the ICU for closer monitoring and further investigation.

## 2019-01-25 NOTE — SUBJECTIVE & OBJECTIVE
Follow-up For: Procedure(s) (LRB):  LAPAROTOMY, EXPLORATORY (N/A)  INCISION AND DRAINAGE, ABSCESS-  drainage of pelvic abscess (N/A)  EXCISION, ABSCESS- drainage abdominal wall abscess (N/A)  APPLICATION, WOUND VAC (N/A)    Post-Operative Day: Day of Surgery     Past Medical History:   Diagnosis Date    Abnormal LFTs 12/14/2018    Asthma     Closed compression fracture of fourth lumbar vertebra     COPD (chronic obstructive pulmonary disease)     Coronary artery disease     Diabetes mellitus     Fall 10/4/2018    Fracture     right tib & fib    Glaucoma     High cholesterol     Hypertension     Infected incision 9/20/2018    Intractable nausea and vomiting 1/23/2019    Iritis     Pulmonary embolus     S/P appendectomy 9/11/2018    Stroke     rt sided weakness.       Past Surgical History:   Procedure Laterality Date    ABDOMINAL SURGERY      APPENDECTOMY N/A 8/26/2018    Performed by Bernadine Melendrez MD at [facility] OR    APPENDECTOMY, LAPAROSCOPIC---CONVERTED TO OPEN APPENDECTOMY @0950 N/A 8/26/2018    Performed by [facility]adine [facility]rez MD at [facility] OR    BACK SURGERY      stimulator    CATARACT EXTRACTION      HYSTERECTOMY      TOTAL HIP ARTHROPLASTY Left     2008       Review of patient's allergies indicates:   Allergen Reactions    Ace inhibitors Swelling    Carvedilol      Other reaction(s): Other (See Comments)  blister    Hydralazine analogues     Metformin Nausea And Vomiting    Tetracycline Itching    Tetracyclines Swelling    Travatan (with benzalkonium) [travoprost (benzalkonium)]     Travoprost Itching     Other reaction(s): Other (See Comments)  Blurry vision       Family History     Problem Relation (Age of Onset)    Cancer Father    Diabetes Brother    Lupus Sister    Multiple sclerosis Mother        Tobacco Use    Smoking status: Never Smoker    Smokeless tobacco: Never Used   Substance and Sexual Activity    Alcohol use: No     Frequency: Never    Drug use: No     Sexual activity: No     Partners: Male      Review of Systems   Unable to perform ROS: Acuity of condition     Objective:     Vital Signs (Most Recent):  Temp: 97.8 °F (36.6 °C) (01/25/19 1400)  Pulse: 107 (01/25/19 1533)  Resp: 20 (01/25/19 1533)  BP: (!) 82/50 (01/25/19 1515)  SpO2: 99 % (01/25/19 1515) Vital Signs (24h Range):  Temp:  [97 °F (36.1 °C)-98 °F (36.7 °C)] 97.8 °F (36.6 °C)  Pulse:  [] 107  Resp:  [16-23] 20  SpO2:  [93 %-100 %] 99 %  BP: ()/(41-88) 82/50  Arterial Line BP: ()/(45-62) 81/46     Weight: 72 kg (158 lb 11.7 oz)  Body mass index is 29.99 kg/m².      Intake/Output Summary (Last 24 hours) at 1/25/2019 1605  Last data filed at 1/25/2019 1144  Gross per 24 hour   Intake 2380 ml   Output 85 ml   Net 2295 ml       Physical Exam   Constitutional: She appears well-developed and well-nourished. She appears distressed.   HENT:   Head: Normocephalic.   Eyes: Pupils are equal, round, and reactive to light.   Cardiovascular: Regular rhythm.   Tachycardic   Pulmonary/Chest: No respiratory distress. She has no wheezes.   On Bipap   Abdominal: Soft.   Neurological:   lethargic   Skin: Skin is warm and dry.       Vents:       Lines/Drains/Airways     Central Venous Catheter Line                 Port A Cath Single Lumen 01/23/19 1400 right subclavian 2 days          Drain                 Closed/Suction Drain 01/25/19 1020 Right;Ventral Abdomen Bulb 19 Fr. less than 1 day         Urethral Catheter 01/25/19 0934 Straight-tip;Non-latex 16 Fr. less than 1 day          Arterial Line                 Arterial Line 01/25/19 1049 Left Radial less than 1 day          Peripheral Intravenous Line                 Peripheral IV - Single Lumen 01/25/19 0950 Left Antecubital less than 1 day                Significant Labs:    CBC/Anemia Profile:  Recent Labs   Lab 01/24/19  0832 01/24/19  0833 01/24/19  1502 01/25/19  0408 01/25/19  1050   WBC  --   --  12.04 11.13 17.77*   HGB  --   --  9.4* 8.5* 9.4*    HCT  --   --  27.6* 25.4* 28.9*   PLT  --   --  183 185 200   MCV  --   --  76* 76* 77*   RDW  --   --  18.1* 17.6* 18.5*   IRON 53  --   --   --   --    FERRITIN 990*  --   --   --   --    FOLATE  --  2.1*  --   --   --    [id number]  --  1523*  --   --   --         Chemistries:  Recent Labs   Lab 01/24/19  1502 01/25/19  0408 01/25/19  1050    134* 135*   K 3.2* 2.9* 3.5    104 107   CO2 20* 22* 20*   BUN 4* 3* 2*   CREATININE 0.7 0.7 0.7   CALCIUM 8.5* 8.2* 7.9*   ALBUMIN 1.9* 1.7* 1.8*   PROT 4.6* 4.1* 4.4*   BILITOT 1.8* 1.5* 1.6*   ALKPHOS 360* 314* 341*   ALT 19 14 14   AST 80* 45* 44*   MG 1.6 2.1 1.9   PHOS 2.0* 1.7* 2.1*       All pertinent labs within the past 24 hours have been reviewed.    Significant Imaging: I have reviewed all pertinent imaging results/findings within the past 24 hours.

## 2019-01-25 NOTE — PROGRESS NOTES
Spoke with Dr Real regarding pt's low blood pressure, awaiting new orders, will continue to monitor.

## 2019-01-25 NOTE — ASSESSMENT & PLAN NOTE
63 y.o. female with hx of asthma, COPD, CAD, DM, HTN, CVA 2012 with R side deficits, HFpEF, severe debility, lap converted to open appendectomy 8/2018 complicated by cdiff, failure to thrive.  Hx of difficult intubation and delayed emergence, CO2 narcosis needing ICU stay.  Hx of PRN O2 at night. Patient now s/p exploratory laparotomy, washout, and pelvic abscess drainage on 1/25/19.    Post-operatively, on initial evaluation in PACU, patient hypotensive to 60s/40s, on BiPap. Currently fluid resuscitating with 3rd liter of LR, responding well to phenylephrine, starting phenylephrine infusion (tachycardic to ~118). ABG 7.11/49/116/16, BE -14, on bipap 14/5, 35% FiO2.     Neuro:  Sedation: None  Pain: dilaudid PCA, dilaudid PRN breakthrough    CV:  - on phenylephrine infusion  - tachycardic to ~118  - fluid resuscitation    Pulm:   - Bipap 14/5, 35% FiO2  - ABG  7.11/49/116/16, BE -14  - 50 meq bicarb x1  - serial ABGs  - daily CXR    Renal:  Trend BUN/Cr  Monitor Urine output    ID:  AF  1/24 blood cultures: gram positive cocci in clusters resembling staph  1/25 operative cultures pending  - on vanc/zosyn  - WBC 7.81    FEN/GI:  NPO  Replace lytes PRN  ppi  Post-op lactate 3.9 --> 7.1 (6.8 on 1/24)  Continue fluid resuscitation    Endo:  Patient with IDDM2, on detemir 20u BID at home  Has not required insulin on the floor prior to procedure (hypoglycemic)  Monitor closely, BG q4    Heme:  Trend H&H  Post-op hgb 8.5    Dispo: Cont ICU care   Primary:

## 2019-01-25 NOTE — PROGRESS NOTES
1052 Patient admitted to PACU with simple o2 mask in place. Pt seemed like she was having trouble breathing(pt confirmed w/yes nod) Tx given as per Dr. Real. Pt still not exchanging well(very little air movement) and becoming nonresponsive. Dr. Carmen called to bedside and second tx started. Pt placed on BIPAP 14/5. Pt began responding shortly after bipap in use. At this time, patient very responsive and cooperative. Will continue to monitor and possibly DC bipap later.

## 2019-01-25 NOTE — CONSULTS
Consult received: full note to follow    SALIMA pt today was pt GENARO for scheduled procedure. Will attempt to assess at next RD f/u 1/28

## 2019-01-25 NOTE — SUBJECTIVE & OBJECTIVE
Interval History:   Patient seen and examined, no acute events overnight  C/o RLQ pain  Has been NPO p MN  Tachycardic, afebrile    Medications:  Continuous Infusions:    Scheduled Meds:   aspirin  81 mg Oral Daily    diltiaZEM  180 mg Oral Daily    DULoxetine  60 mg Oral Daily    fluticasone-vilanterol  1 puff Inhalation Daily    gabapentin  300 mg Oral Daily    losartan  100 mg Oral Daily    morphine  1 mg Intravenous Q8H    ondansetron  4 mg Intravenous Q6H    pantoprozole (PROTONIX) IV  40 mg Intravenous Q12H    piperacillin-tazobactam (ZOSYN) IVPB  4.5 g Intravenous Q8H    potassium chloride  40 mEq Oral Once    pyridoxine (vitamin B6)  50 mg Oral Daily    sucralfate  1 g Oral QID (AC & HS)    theophylline  300 mg Oral Daily    vancomycin (VANCOCIN) IVPB  1,000 mg Intravenous Q12H     PRN Meds:acetaminophen, albuterol-ipratropium, dextrose 50 % in water (D50W), dextrose 50 % in water (D50W), diclofenac sodium, glucagon (human recombinant), glucose, glucose, ketorolac, promethazine (PHENERGAN) IVPB, promethazine, ramelteon, sodium chloride 0.9%     Review of patient's allergies indicates:   Allergen Reactions    Ace inhibitors Swelling    Carvedilol      Other reaction(s): Other (See Comments)  blister    Hydralazine analogues     Metformin Nausea And Vomiting    Tetracycline Itching    Tetracyclines Swelling    Travatan (with benzalkonium) [travoprost (benzalkonium)]     Travoprost Itching     Other reaction(s): Other (See Comments)  Blurry vision     Objective:     Vital Signs (Most Recent):  Temp: 97.8 °F (36.6 °C) (01/25/19 0300)  Pulse: 95 (01/25/19 0300)  Resp: 18 (01/25/19 0300)  BP: (!) 108/58 (01/25/19 0300)  SpO2: 95 % (01/25/19 0300) Vital Signs (24h Range):  Temp:  [97.2 °F (36.2 °C)-97.8 °F (36.6 °C)] 97.8 °F (36.6 °C)  Pulse:  [] 95  Resp:  [16-20] 18  SpO2:  [94 %-100 %] 95 %  BP: (108-151)/() 108/58     Weight: 72 kg (158 lb 11.7 oz)  Body mass index is 29.99  kg/m².    Intake/Output - Last 3 Shifts       01/23 0700 - 01/24 0659 01/24 0700 - 01/25 0659 01/25 0700 - 01/26 0659    P.O.  780     IV Piggyback 1100      Total Intake(mL/kg) 1100 (15.3) 780 (10.8)     Net +1100 +780            Urine Occurrence  15 x     Stool Occurrence  0 x     Emesis Occurrence  3 x           Physical Exam   Constitutional: She appears well-developed and well-nourished. No distress.   HENT:   Head: Normocephalic and atraumatic.   Cardiovascular:   Tachycardic    Pulmonary/Chest: Effort normal. No respiratory distress.   Abdominal:   Soft, +TTP RLQ  +incisional hernia  Well-healed McBurney's incision. There is palpable firm mass midway where the incision is that is tender to palpation. No overlying skin changes.       Significant Labs:  CBC:   Recent Labs   Lab 01/25/19  0408   WBC 11.13   RBC 3.36*   HGB 8.5*   HCT 25.4*      MCV 76*   MCH 25.3*   MCHC 33.5     BMP:   Recent Labs   Lab 01/25/19  0408   GLU 65*   *   K 2.9*      CO2 22*   BUN 3*   CREATININE 0.7   CALCIUM 8.2*   MG 2.1     CMP:   Recent Labs   Lab 01/25/19  0408   GLU 65*   CALCIUM 8.2*   ALBUMIN 1.7*   PROT 4.1*   *   K 2.9*   CO2 22*      BUN 3*   CREATININE 0.7   ALKPHOS 314*   ALT 14   AST 45*   BILITOT 1.5*     LFTs:   Recent Labs   Lab 01/25/19 0408   ALT 14   AST 45*   ALKPHOS 314*   BILITOT 1.5*   PROT 4.1*   ALBUMIN 1.7*

## 2019-01-25 NOTE — PROGRESS NOTES
Dr Carmen at bedside, pt c/o SOB, pt is hypotensive, Normal saline bolus ordered and administered, RT at bedside with Bipap, will continue to monitor.

## 2019-01-25 NOTE — PROGRESS NOTES
"[facility]  General Surgery  Progress Note    Subjective:     History of Present Illness:  62 yo W with a history of HTN, COPD on home oxygen, HFpEF, IDDMII, CVA on plavix, HTN, debility after fall and broken hip, and PE who presents to the ED with a several month history of nausea, vomiting, inability to tolerate a diet and weight loss. She has had multiple admissions in the past few months for different ailments. She presents today with continued nausea, vomiting and abdominal pain that is mostly worse in the RLQ. During the examination, she states her pain was all over her abdomen because she was retching so much. She states that she has lost close to 40lbs since her surgery and that she only able to keep broth down. She had a lap converted to open appendectomy back in August 2018 that was complicated by a wound infection, C diff and failure to thrive. This seems to persists. She is now wheelchair bound (per her) and apparently lives in [place] but is here in [place] with her daughter.    She had a CT scan in the ED which revealed an irregular shaped rim enhancing fluid collection measuring at least 4.0 x 3.0 cm ight hemipelvis at the site of prior appendectomy. Surgery was consulted for further recommendations. She was also noted to have a "knot" at the RLQ incision site that is also tender.    Post-Op Info:  Procedure(s) (LRB):  LAPAROTOMY, EXPLORATORY (N/A)   Day of Surgery     Interval History:   Patient seen and examined, no acute events overnight  C/o RLQ pain  Has been NPO p MN  Tachycardic, afebrile    Medications:  Continuous Infusions:    Scheduled Meds:   aspirin  81 mg Oral Daily    diltiaZEM  180 mg Oral Daily    DULoxetine  60 mg Oral Daily    fluticasone-vilanterol  1 puff Inhalation Daily    gabapentin  300 mg Oral Daily    losartan  100 mg Oral Daily    morphine  1 mg Intravenous Q8H    ondansetron  4 mg Intravenous Q6H    pantoprozole (PROTONIX) IV  40 mg " Intravenous Q12H    piperacillin-tazobactam (ZOSYN) IVPB  4.5 g Intravenous Q8H    potassium chloride  40 mEq Oral Once    pyridoxine (vitamin B6)  50 mg Oral Daily    sucralfate  1 g Oral QID (AC & HS)    theophylline  300 mg Oral Daily    vancomycin (VANCOCIN) IVPB  1,000 mg Intravenous Q12H     PRN Meds:acetaminophen, albuterol-ipratropium, dextrose 50 % in water (D50W), dextrose 50 % in water (D50W), diclofenac sodium, glucagon (human recombinant), glucose, glucose, ketorolac, promethazine (PHENERGAN) IVPB, promethazine, ramelteon, sodium chloride 0.9%     Review of patient's allergies indicates:   Allergen Reactions    Ace inhibitors Swelling    Carvedilol      Other reaction(s): Other (See Comments)  blister    Hydralazine analogues     Metformin Nausea And Vomiting    Tetracycline Itching    Tetracyclines Swelling    Travatan (with benzalkonium) [travoprost (benzalkonium)]     Travoprost Itching     Other reaction(s): Other (See Comments)  Blurry vision     Objective:     Vital Signs (Most Recent):  Temp: 97.8 °F (36.6 °C) (01/25/19 0300)  Pulse: 95 (01/25/19 0300)  Resp: 18 (01/25/19 0300)  BP: (!) 108/58 (01/25/19 0300)  SpO2: 95 % (01/25/19 0300) Vital Signs (24h Range):  Temp:  [97.2 °F (36.2 °C)-97.8 °F (36.6 °C)] 97.8 °F (36.6 °C)  Pulse:  [] 95  Resp:  [16-20] 18  SpO2:  [94 %-100 %] 95 %  BP: (108-151)/() 108/58     Weight: 72 kg (158 lb 11.7 oz)  Body mass index is 29.99 kg/m².    Intake/Output - Last 3 Shifts       01/23 0700 - 01/24 0659 01/24 0700 - 01/25 0659 01/25 0700 - 01/26 0659    P.O.  780     IV Piggyback 1100      Total Intake(mL/kg) 1100 (15.3) 780 (10.8)     Net +1100 +780            Urine Occurrence  15 x     Stool Occurrence  0 x     Emesis Occurrence  3 x           Physical Exam   Constitutional: She appears well-developed and well-nourished. No distress.   HENT:   Head: Normocephalic and atraumatic.   Cardiovascular:   Tachycardic    Pulmonary/Chest: Effort  normal. No respiratory distress.   Abdominal:   Soft, +TTP RLQ  +incisional hernia  Well-healed McBurney's incision. There is palpable firm mass midway where the incision is that is tender to palpation. No overlying skin changes.       Significant Labs:  CBC:   Recent Labs   Lab 01/25/19 0408   WBC 11.13   RBC 3.36*   HGB 8.5*   HCT 25.4*      MCV 76*   MCH 25.3*   MCHC 33.5     BMP:   Recent Labs   Lab 01/25/19 0408   GLU 65*   *   K 2.9*      CO2 22*   BUN 3*   CREATININE 0.7   CALCIUM 8.2*   MG 2.1     CMP:   Recent Labs   Lab 01/25/19 0408   GLU 65*   CALCIUM 8.2*   ALBUMIN 1.7*   PROT 4.1*   *   K 2.9*   CO2 22*      BUN 3*   CREATININE 0.7   ALKPHOS 314*   ALT 14   AST 45*   BILITOT 1.5*     LFTs:   Recent Labs   Lab 01/25/19 0408   ALT 14   AST 45*   ALKPHOS 314*   BILITOT 1.5*   PROT 4.1*   ALBUMIN 1.7*     Assessment/Plan:     Depression    prozac      Hypophosphatemia    Replace      Debility    PT/OT     Hypokalemia    Replace      Generalized abdominal pain    64 yo female presenting with abdominal pain, nausea, elevated lacate    -NPO p MN  -to OR today for ex lap  -consent obtained  -IV abx - zosyn/vanc started 1/24  -pain/nausea meds PRN  -GI prophylaxis  -PT/OT/IS     Severe malnutrition    - f/u dietary recs  - may require temporary TPN  - prealbumin = 5     Essential hypertension    Losartan, diltiazem     Gastroesophageal reflux disease without esophagitis    Sucralfate      (HFpEF) heart failure with preserved ejection fraction    Last echo 8/2018 with no WMAs, grade 1DD, EF 60  - strict I/O, daily weights     Moderate asthma without complication    -breo, theophylline, PRN duonebs     CAD (coronary artery disease)    - ASA       Insulin dependent diabetes mellitus    SSI         Nunu Cee PA-C   [id number]  General Surgery  [facility]

## 2019-01-25 NOTE — ANESTHESIA PROCEDURE NOTES
Arterial    Diagnosis: sepsis    Patient location during procedure: done in OR  Staffing  Anesthesiologist: Dimitris Carmen MD  Performed: anesthesiologist   Anesthesiologist was present at the time of the procedure.  Preanesthetic Checklist  Completed: patient identified, surgical consent, pre-op evaluation, timeout performed, IV checked, risks and benefits discussed, monitors and equipment checked and anesthesia consent givenArterial  Skin Prep: chlorhexidine gluconate  Local Infiltration: none  Orientation: left  Location: radial  Catheter Size: 20 G  Catheter placement by Anatomical landmarks. Heme positive aspiration all ports.Insertion Attempts: 1  Assessment  Dressing: secured with tape and tegaderm and tegaderm  Patient: Tolerated well

## 2019-01-25 NOTE — ASSESSMENT & PLAN NOTE
64 yo female presenting with abdominal pain, nausea, elevated lacate    -NPO p MN  -to OR today for ex lap  -consent obtained  -IV abx - zosyn/vanc started 1/24  -pain/nausea meds PRN  -GI prophylaxis  -PT/OT/IS

## 2019-01-26 PROBLEM — N17.9 AKI (ACUTE KIDNEY INJURY): Status: ACTIVE | Noted: 2019-01-26

## 2019-01-26 LAB
ALBUMIN SERPL BCP-MCNC: 1.3 G/DL
ALBUMIN SERPL BCP-MCNC: 1.4 G/DL
ALBUMIN SERPL BCP-MCNC: 1.4 G/DL
ALLENS TEST: ABNORMAL
ALP SERPL-CCNC: 235 U/L
ALP SERPL-CCNC: 236 U/L
ALP SERPL-CCNC: 245 U/L
ALT SERPL W/O P-5'-P-CCNC: 10 U/L
ALT SERPL W/O P-5'-P-CCNC: 10 U/L
ALT SERPL W/O P-5'-P-CCNC: 9 U/L
ANION GAP SERPL CALC-SCNC: 11 MMOL/L
ANION GAP SERPL CALC-SCNC: 12 MMOL/L
ANION GAP SERPL CALC-SCNC: 14 MMOL/L
ANION GAP SERPL CALC-SCNC: 14 MMOL/L
ANION GAP SERPL CALC-SCNC: 15 MMOL/L
ANION GAP SERPL CALC-SCNC: 16 MMOL/L
ANISOCYTOSIS BLD QL SMEAR: SLIGHT
ASCENDING AORTA: 2.97 CM
AST SERPL-CCNC: 22 U/L
AST SERPL-CCNC: 27 U/L
AST SERPL-CCNC: 29 U/L
BASOPHILS # BLD AUTO: 0.04 K/UL
BASOPHILS # BLD AUTO: ABNORMAL K/UL
BASOPHILS NFR BLD: 0 %
BASOPHILS NFR BLD: 0.4 %
BILIRUB SERPL-MCNC: 1.3 MG/DL
BILIRUB SERPL-MCNC: 1.3 MG/DL
BILIRUB SERPL-MCNC: 2.1 MG/DL
BSA FOR ECHO PROCEDURE: 1.75 M2
BUN SERPL-MCNC: 2 MG/DL
BUN SERPL-MCNC: <2 MG/DL
BUN SERPL-MCNC: <2 MG/DL
BURR CELLS BLD QL SMEAR: ABNORMAL
CA-I BLDV-SCNC: 1.05 MMOL/L
CA-I BLDV-SCNC: 1.08 MMOL/L
CALCIUM SERPL-MCNC: 6.7 MG/DL
CALCIUM SERPL-MCNC: 6.8 MG/DL
CALCIUM SERPL-MCNC: 7 MG/DL
CALCIUM SERPL-MCNC: 7.3 MG/DL
CALCIUM SERPL-MCNC: 7.5 MG/DL
CALCIUM SERPL-MCNC: 7.6 MG/DL
CHLORIDE SERPL-SCNC: 104 MMOL/L
CHLORIDE SERPL-SCNC: 105 MMOL/L
CHLORIDE SERPL-SCNC: 106 MMOL/L
CHLORIDE SERPL-SCNC: 108 MMOL/L
CHLORIDE SERPL-SCNC: 108 MMOL/L
CHLORIDE SERPL-SCNC: 114 MMOL/L
CK SERPL-CCNC: 75 U/L
CO2 SERPL-SCNC: 12 MMOL/L
CO2 SERPL-SCNC: 12 MMOL/L
CO2 SERPL-SCNC: 15 MMOL/L
CO2 SERPL-SCNC: 19 MMOL/L
CO2 SERPL-SCNC: 7 MMOL/L
CO2 SERPL-SCNC: 9 MMOL/L
CREAT SERPL-MCNC: 0.5 MG/DL
CREAT SERPL-MCNC: 0.5 MG/DL
CREAT SERPL-MCNC: 0.6 MG/DL
CREAT SERPL-MCNC: 0.7 MG/DL
CREAT SERPL-MCNC: 0.8 MG/DL
CREAT SERPL-MCNC: 0.8 MG/DL
CV ECHO LV RWT: 0.42 CM
DELSYS: ABNORMAL
DIFFERENTIAL METHOD: ABNORMAL
DIFFERENTIAL METHOD: ABNORMAL
DOP CALC LVOT AREA: 3.05 CM2
DOP CALC LVOT DIAMETER: 1.97 CM
DOP CALC LVOT PEAK VEL: 1.19 M/S
DOP CALC LVOT STROKE VOLUME: 55.78 CM3
DOP CALCLVOT PEAK VEL VTI: 18.31 CM
E WAVE DECELERATION TIME: 193.93 MSEC
E/A RATIO: 1.2
E/E' RATIO: 9.73
ECHO LV POSTERIOR WALL: 0.76 CM (ref 0.6–1.1)
EOSINOPHIL # BLD AUTO: 0.2 K/UL
EOSINOPHIL # BLD AUTO: ABNORMAL K/UL
EOSINOPHIL NFR BLD: 1.4 %
EOSINOPHIL NFR BLD: 4 %
ERYTHROCYTE [DISTWIDTH] IN BLOOD BY AUTOMATED COUNT: 18.6 %
ERYTHROCYTE [DISTWIDTH] IN BLOOD BY AUTOMATED COUNT: 19.4 %
ERYTHROCYTE [SEDIMENTATION RATE] IN BLOOD BY WESTERGREN METHOD: 14 MM/H
ERYTHROCYTE [SEDIMENTATION RATE] IN BLOOD BY WESTERGREN METHOD: 15 MM/H
ERYTHROCYTE [SEDIMENTATION RATE] IN BLOOD BY WESTERGREN METHOD: 20 MM/H
ERYTHROCYTE [SEDIMENTATION RATE] IN BLOOD BY WESTERGREN METHOD: 25 MM/H
EST. GFR  (AFRICAN AMERICAN): >60 ML/MIN/1.73 M^2
EST. GFR  (NON AFRICAN AMERICAN): >60 ML/MIN/1.73 M^2
FIO2: 0
FIO2: 30
FIO2: 40
FIO2: 40
FLOW: 6
FRACTIONAL SHORTENING: 39 % (ref 28–44)
GLUCOSE SERPL-MCNC: 128 MG/DL
GLUCOSE SERPL-MCNC: 179 MG/DL
GLUCOSE SERPL-MCNC: 184 MG/DL
GLUCOSE SERPL-MCNC: 203 MG/DL
GLUCOSE SERPL-MCNC: 246 MG/DL
GLUCOSE SERPL-MCNC: 34 MG/DL
HCO3 UR-SCNC: 10.2 MMOL/L (ref 24–28)
HCO3 UR-SCNC: 11.3 MMOL/L (ref 24–28)
HCO3 UR-SCNC: 13.1 MMOL/L (ref 24–28)
HCO3 UR-SCNC: 14.3 MMOL/L (ref 24–28)
HCO3 UR-SCNC: 16.4 MMOL/L (ref 24–28)
HCO3 UR-SCNC: 20.1 MMOL/L (ref 24–28)
HCO3 UR-SCNC: 8 MMOL/L (ref 24–28)
HCT VFR BLD AUTO: 24.7 %
HCT VFR BLD AUTO: 28.1 %
HGB BLD-MCNC: 7.6 G/DL
HGB BLD-MCNC: 9 G/DL
HYPOCHROMIA BLD QL SMEAR: ABNORMAL
IMM GRANULOCYTES # BLD AUTO: 0.09 K/UL
IMM GRANULOCYTES # BLD AUTO: ABNORMAL K/UL
IMM GRANULOCYTES NFR BLD AUTO: 0.9 %
IMM GRANULOCYTES NFR BLD AUTO: ABNORMAL %
INTERVENTRICULAR SEPTUM: 1 CM (ref 0.6–1.1)
IVRT: 0.09 MSEC
LA MAJOR: 5 CM
LA MINOR: 5.74 CM
LA WIDTH: 3.16 CM
LACTATE SERPL-SCNC: 10.1 MMOL/L
LACTATE SERPL-SCNC: 6.9 MMOL/L
LACTATE SERPL-SCNC: 9.1 MMOL/L
LACTATE SERPL-SCNC: 9.3 MMOL/L
LACTATE SERPL-SCNC: 9.3 MMOL/L
LACTATE SERPL-SCNC: 9.6 MMOL/L
LEFT ATRIUM SIZE: 3.77 CM
LEFT ATRIUM VOLUME INDEX: 31.8 ML/M2
LEFT ATRIUM VOLUME: 54.12 CM3
LEFT INTERNAL DIMENSION IN SYSTOLE: 2.19 CM (ref 2.1–4)
LEFT VENTRICLE DIASTOLIC VOLUME INDEX: 32.06 ML/M2
LEFT VENTRICLE DIASTOLIC VOLUME: 54.64 ML
LEFT VENTRICLE MASS INDEX: 53 G/M2
LEFT VENTRICLE SYSTOLIC VOLUME INDEX: 9.4 ML/M2
LEFT VENTRICLE SYSTOLIC VOLUME: 15.98 ML
LEFT VENTRICULAR INTERNAL DIMENSION IN DIASTOLE: 3.61 CM (ref 3.5–6)
LEFT VENTRICULAR MASS: 90.3 G
LV LATERAL E/E' RATIO: 8.11
LV SEPTAL E/E' RATIO: 12.17
LYMPHOCYTES # BLD AUTO: 1.5 K/UL
LYMPHOCYTES # BLD AUTO: ABNORMAL K/UL
LYMPHOCYTES NFR BLD: 13.9 %
LYMPHOCYTES NFR BLD: 16 %
MAGNESIUM SERPL-MCNC: 1.2 MG/DL
MAGNESIUM SERPL-MCNC: 1.5 MG/DL
MAGNESIUM SERPL-MCNC: 1.7 MG/DL
MCH RBC QN AUTO: 24.9 PG
MCH RBC QN AUTO: 26.7 PG
MCHC RBC AUTO-ENTMCNC: 30.8 G/DL
MCHC RBC AUTO-ENTMCNC: 32 G/DL
MCV RBC AUTO: 81 FL
MCV RBC AUTO: 83 FL
MIN VOL: 5
MIN VOL: 6.5
MIN VOL: 70
MIN VOL: 9
MIN VOL: 9
MIN VOL: 9.1
MODE: ABNORMAL
MONOCYTES # BLD AUTO: 0.4 K/UL
MONOCYTES # BLD AUTO: ABNORMAL K/UL
MONOCYTES NFR BLD: 1 %
MONOCYTES NFR BLD: 4 %
MV PEAK A VEL: 0.61 M/S
MV PEAK E VEL: 0.73 M/S
NEUTROPHILS # BLD AUTO: 8.3 K/UL
NEUTROPHILS NFR BLD: 66 %
NEUTROPHILS NFR BLD: 79.4 %
NEUTS BAND NFR BLD MANUAL: 13 %
NRBC BLD-RTO: 0 /100 WBC
NRBC BLD-RTO: 0 /100 WBC
PCO2 BLDA: 19.4 MMHG (ref 35–45)
PCO2 BLDA: 22.6 MMHG (ref 35–45)
PCO2 BLDA: 24.8 MMHG (ref 35–45)
PCO2 BLDA: 30.8 MMHG (ref 35–45)
PCO2 BLDA: 31.2 MMHG (ref 35–45)
PCO2 BLDA: 41.8 MMHG (ref 35–45)
PCO2 BLDA: 47 MMHG (ref 35–45)
PEEP: 5
PH SMN: 7.09 [PH] (ref 7.35–7.45)
PH SMN: 7.1 [PH] (ref 7.35–7.45)
PH SMN: 7.17 [PH] (ref 7.35–7.45)
PH SMN: 7.22 [PH] (ref 7.35–7.45)
PH SMN: 7.26 [PH] (ref 7.35–7.45)
PH SMN: 7.42 [PH] (ref 7.35–7.45)
PH SMN: 7.43 [PH] (ref 7.35–7.45)
PHOSPHATE SERPL-MCNC: 1.7 MG/DL
PHOSPHATE SERPL-MCNC: 1.7 MG/DL
PHOSPHATE SERPL-MCNC: 1.9 MG/DL
PHOSPHATE SERPL-MCNC: 2.1 MG/DL
PHOSPHATE SERPL-MCNC: 2.1 MG/DL
PHOSPHATE SERPL-MCNC: 2.3 MG/DL
PIP: 20
PIP: 23
PIP: 30
PISA TR MAX VEL: 2.4 M/S
PLATELET # BLD AUTO: 134 K/UL
PLATELET # BLD AUTO: 153 K/UL
PLATELET BLD QL SMEAR: ABNORMAL
PMV BLD AUTO: 10.5 FL
PMV BLD AUTO: 9.8 FL
PO2 BLDA: 105 MMHG (ref 80–100)
PO2 BLDA: 112 MMHG (ref 80–100)
PO2 BLDA: 112 MMHG (ref 80–100)
PO2 BLDA: 117 MMHG (ref 80–100)
PO2 BLDA: 164 MMHG (ref 80–100)
PO2 BLDA: 200 MMHG (ref 80–100)
PO2 BLDA: 88 MMHG (ref 80–100)
POC BE: -16 MMOL/L
POC BE: -17 MMOL/L
POC BE: -20 MMOL/L
POC BE: -4 MMOL/L
POC BE: -8 MMOL/L
POC SATURATED O2: 96 % (ref 95–100)
POC SATURATED O2: 97 % (ref 95–100)
POC SATURATED O2: 97 % (ref 95–100)
POC SATURATED O2: 98 % (ref 95–100)
POC SATURATED O2: 99 % (ref 95–100)
POC TCO2: 11 MMOL/L (ref 23–27)
POC TCO2: 12 MMOL/L (ref 23–27)
POC TCO2: 14 MMOL/L (ref 23–27)
POC TCO2: 16 MMOL/L (ref 23–27)
POC TCO2: 17 MMOL/L (ref 23–27)
POC TCO2: 21 MMOL/L (ref 23–27)
POC TCO2: 9 MMOL/L (ref 23–27)
POCT GLUCOSE: 119 MG/DL (ref 70–110)
POCT GLUCOSE: 135 MG/DL (ref 70–110)
POCT GLUCOSE: 189 MG/DL (ref 70–110)
POCT GLUCOSE: 195 MG/DL (ref 70–110)
POIKILOCYTOSIS BLD QL SMEAR: ABNORMAL
POLYCHROMASIA BLD QL SMEAR: ABNORMAL
POTASSIUM SERPL-SCNC: 2.9 MMOL/L
POTASSIUM SERPL-SCNC: 3.8 MMOL/L
POTASSIUM SERPL-SCNC: 3.9 MMOL/L
POTASSIUM SERPL-SCNC: 4 MMOL/L
POTASSIUM SERPL-SCNC: 4.1 MMOL/L
POTASSIUM SERPL-SCNC: 4.1 MMOL/L
PROT SERPL-MCNC: 3.2 G/DL
PROT SERPL-MCNC: 3.4 G/DL
PROT SERPL-MCNC: 3.4 G/DL
RA MAJOR: 4.78 CM
RA WIDTH: 2.93 CM
RBC # BLD AUTO: 3.05 M/UL
RBC # BLD AUTO: 3.37 M/UL
RIGHT VENTRICULAR END-DIASTOLIC DIMENSION: 3.38 CM
RV TISSUE DOPPLER FREE WALL SYSTOLIC VELOCITY 1 (APICAL 4 CHAMBER VIEW): 10.81 M/S
SAMPLE: ABNORMAL
SINUS: 2.78 CM
SITE: ABNORMAL
SODIUM SERPL-SCNC: 129 MMOL/L
SODIUM SERPL-SCNC: 132 MMOL/L
SODIUM SERPL-SCNC: 132 MMOL/L
SODIUM SERPL-SCNC: 134 MMOL/L
SODIUM SERPL-SCNC: 134 MMOL/L
SODIUM SERPL-SCNC: 140 MMOL/L
SP02: 100
SP02: 93
STJ: 2.42 CM
TDI LATERAL: 0.09
TDI SEPTAL: 0.06
TDI: 0.08
TR MAX PG: 23.04 MMHG
TRICUSPID ANNULAR PLANE SYSTOLIC EXCURSION: 1.88 CM
TROPONIN I SERPL DL<=0.01 NG/ML-MCNC: 0.04 NG/ML
TROPONIN I SERPL DL<=0.01 NG/ML-MCNC: 0.06 NG/ML
TROPONIN I SERPL DL<=0.01 NG/ML-MCNC: 0.08 NG/ML
VANCOMYCIN TROUGH SERPL-MCNC: 10.6 UG/ML
VT: 350
WBC # BLD AUTO: 10.43 K/UL
WBC # BLD AUTO: 11.45 K/UL

## 2019-01-26 PROCEDURE — 80069 RENAL FUNCTION PANEL: CPT | Mod: 91

## 2019-01-26 PROCEDURE — 63600175 PHARM REV CODE 636 W HCPCS: Performed by: PHYSICIAN ASSISTANT

## 2019-01-26 PROCEDURE — 80053 COMPREHEN METABOLIC PANEL: CPT | Mod: 91

## 2019-01-26 PROCEDURE — 37799 UNLISTED PX VASCULAR SURGERY: CPT

## 2019-01-26 PROCEDURE — 85025 COMPLETE CBC W/AUTO DIFF WBC: CPT

## 2019-01-26 PROCEDURE — 83605 ASSAY OF LACTIC ACID: CPT

## 2019-01-26 PROCEDURE — 82550 ASSAY OF CK (CPK): CPT

## 2019-01-26 PROCEDURE — 87040 BLOOD CULTURE FOR BACTERIA: CPT

## 2019-01-26 PROCEDURE — 25000242 PHARM REV CODE 250 ALT 637 W/ HCPCS: Performed by: SURGERY

## 2019-01-26 PROCEDURE — 63600175 PHARM REV CODE 636 W HCPCS: Performed by: STUDENT IN AN ORGANIZED HEALTH CARE EDUCATION/TRAINING PROGRAM

## 2019-01-26 PROCEDURE — 80053 COMPREHEN METABOLIC PANEL: CPT

## 2019-01-26 PROCEDURE — A4217 STERILE WATER/SALINE, 500 ML: HCPCS | Performed by: SURGERY

## 2019-01-26 PROCEDURE — 85027 COMPLETE CBC AUTOMATED: CPT

## 2019-01-26 PROCEDURE — C9113 INJ PANTOPRAZOLE SODIUM, VIA: HCPCS | Performed by: PHYSICIAN ASSISTANT

## 2019-01-26 PROCEDURE — 94761 N-INVAS EAR/PLS OXIMETRY MLT: CPT

## 2019-01-26 PROCEDURE — 83735 ASSAY OF MAGNESIUM: CPT

## 2019-01-26 PROCEDURE — 83735 ASSAY OF MAGNESIUM: CPT | Mod: 91

## 2019-01-26 PROCEDURE — P9021 RED BLOOD CELLS UNIT: HCPCS

## 2019-01-26 PROCEDURE — 99222 PR INITIAL HOSPITAL CARE,LEVL II: ICD-10-PCS | Mod: ,,, | Performed by: INTERNAL MEDICINE

## 2019-01-26 PROCEDURE — 99900035 HC TECH TIME PER 15 MIN (STAT)

## 2019-01-26 PROCEDURE — 25000003 PHARM REV CODE 250: Performed by: PHYSICIAN ASSISTANT

## 2019-01-26 PROCEDURE — 90945 DIALYSIS ONE EVALUATION: CPT

## 2019-01-26 PROCEDURE — 84100 ASSAY OF PHOSPHORUS: CPT | Mod: 91

## 2019-01-26 PROCEDURE — 25000003 PHARM REV CODE 250: Performed by: SURGERY

## 2019-01-26 PROCEDURE — 80069 RENAL FUNCTION PANEL: CPT

## 2019-01-26 PROCEDURE — 63600175 PHARM REV CODE 636 W HCPCS: Performed by: SURGERY

## 2019-01-26 PROCEDURE — 82330 ASSAY OF CALCIUM: CPT

## 2019-01-26 PROCEDURE — 93005 ELECTROCARDIOGRAM TRACING: CPT

## 2019-01-26 PROCEDURE — 25000003 PHARM REV CODE 250: Performed by: STUDENT IN AN ORGANIZED HEALTH CARE EDUCATION/TRAINING PROGRAM

## 2019-01-26 PROCEDURE — 25000003 PHARM REV CODE 250

## 2019-01-26 PROCEDURE — 82803 BLOOD GASES ANY COMBINATION: CPT

## 2019-01-26 PROCEDURE — 85007 BL SMEAR W/DIFF WBC COUNT: CPT

## 2019-01-26 PROCEDURE — 83605 ASSAY OF LACTIC ACID: CPT | Mod: 91

## 2019-01-26 PROCEDURE — 94668 MNPJ CHEST WALL SBSQ: CPT

## 2019-01-26 PROCEDURE — 94002 VENT MGMT INPAT INIT DAY: CPT

## 2019-01-26 PROCEDURE — 63600175 PHARM REV CODE 636 W HCPCS

## 2019-01-26 PROCEDURE — 94664 DEMO&/EVAL PT USE INHALER: CPT

## 2019-01-26 PROCEDURE — 84100 ASSAY OF PHOSPHORUS: CPT

## 2019-01-26 PROCEDURE — 94640 AIRWAY INHALATION TREATMENT: CPT

## 2019-01-26 PROCEDURE — 82330 ASSAY OF CALCIUM: CPT | Mod: 91

## 2019-01-26 PROCEDURE — 93010 ELECTROCARDIOGRAM REPORT: CPT | Mod: ,,, | Performed by: INTERNAL MEDICINE

## 2019-01-26 PROCEDURE — 31500 INSERT EMERGENCY AIRWAY: CPT | Mod: ,,, | Performed by: STUDENT IN AN ORGANIZED HEALTH CARE EDUCATION/TRAINING PROGRAM

## 2019-01-26 PROCEDURE — 25000003 PHARM REV CODE 250: Performed by: NURSE PRACTITIONER

## 2019-01-26 PROCEDURE — 84484 ASSAY OF TROPONIN QUANT: CPT | Mod: 91

## 2019-01-26 PROCEDURE — 31500 INTUBATION: ICD-10-PCS | Mod: ,,, | Performed by: STUDENT IN AN ORGANIZED HEALTH CARE EDUCATION/TRAINING PROGRAM

## 2019-01-26 PROCEDURE — 27000221 HC OXYGEN, UP TO 24 HOURS

## 2019-01-26 PROCEDURE — 99222 1ST HOSP IP/OBS MODERATE 55: CPT | Mod: ,,, | Performed by: INTERNAL MEDICINE

## 2019-01-26 PROCEDURE — 93010 ELECTROCARDIOGRAM REPORT: CPT | Mod: 76,,, | Performed by: INTERNAL MEDICINE

## 2019-01-26 PROCEDURE — 84484 ASSAY OF TROPONIN QUANT: CPT

## 2019-01-26 PROCEDURE — 80048 BASIC METABOLIC PNL TOTAL CA: CPT

## 2019-01-26 PROCEDURE — 27000646 HC AEROBIKA DEVICE

## 2019-01-26 PROCEDURE — 93010 EKG 12-LEAD: ICD-10-PCS | Mod: 76,,, | Performed by: INTERNAL MEDICINE

## 2019-01-26 PROCEDURE — 20000000 HC ICU ROOM

## 2019-01-26 RX ORDER — DEXMEDETOMIDINE HYDROCHLORIDE 4 UG/ML
0.2 INJECTION, SOLUTION INTRAVENOUS CONTINUOUS
Status: DISCONTINUED | OUTPATIENT
Start: 2019-01-26 | End: 2019-01-28

## 2019-01-26 RX ORDER — FENTANYL CITRATE-0.9 % NACL/PF 10 MCG/ML
PLASTIC BAG, INJECTION (ML) INTRAVENOUS CONTINUOUS
Status: DISCONTINUED | OUTPATIENT
Start: 2019-01-26 | End: 2019-01-26

## 2019-01-26 RX ORDER — LORAZEPAM 2 MG/ML
INJECTION INTRAMUSCULAR
Status: DISCONTINUED
Start: 2019-01-26 | End: 2019-01-26 | Stop reason: WASHOUT

## 2019-01-26 RX ORDER — INDOMETHACIN 25 MG/1
CAPSULE ORAL
Status: DISPENSED
Start: 2019-01-26 | End: 2019-01-26

## 2019-01-26 RX ORDER — NOREPINEPHRINE BITARTRATE/D5W 4MG/250ML
0.02 PLASTIC BAG, INJECTION (ML) INTRAVENOUS CONTINUOUS
Status: DISCONTINUED | OUTPATIENT
Start: 2019-01-26 | End: 2019-01-26

## 2019-01-26 RX ORDER — ROCURONIUM BROMIDE 10 MG/ML
INJECTION, SOLUTION INTRAVENOUS
Status: COMPLETED
Start: 2019-01-26 | End: 2019-01-26

## 2019-01-26 RX ORDER — PROPOFOL 10 MG/ML
INJECTION, EMULSION INTRAVENOUS
Status: DISPENSED
Start: 2019-01-26 | End: 2019-01-26

## 2019-01-26 RX ORDER — POTASSIUM CHLORIDE 14.9 MG/ML
80 INJECTION INTRAVENOUS ONCE
Status: COMPLETED | OUTPATIENT
Start: 2019-01-26 | End: 2019-01-26

## 2019-01-26 RX ORDER — DEXMEDETOMIDINE HYDROCHLORIDE 4 UG/ML
INJECTION, SOLUTION INTRAVENOUS
Status: COMPLETED
Start: 2019-01-26 | End: 2019-01-26

## 2019-01-26 RX ORDER — MAGNESIUM SULFATE HEPTAHYDRATE 40 MG/ML
2 INJECTION, SOLUTION INTRAVENOUS
Status: DISCONTINUED | OUTPATIENT
Start: 2019-01-26 | End: 2019-01-27

## 2019-01-26 RX ORDER — PROPOFOL 10 MG/ML
5 INJECTION, EMULSION INTRAVENOUS CONTINUOUS
Status: DISCONTINUED | OUTPATIENT
Start: 2019-01-26 | End: 2019-01-28

## 2019-01-26 RX ORDER — HYDROCODONE BITARTRATE AND ACETAMINOPHEN 500; 5 MG/1; MG/1
TABLET ORAL
Status: DISCONTINUED | OUTPATIENT
Start: 2019-01-26 | End: 2019-01-31

## 2019-01-26 RX ORDER — SUCCINYLCHOLINE CHLORIDE 20 MG/ML
100 INJECTION INTRAMUSCULAR; INTRAVENOUS ONCE
Status: COMPLETED | OUTPATIENT
Start: 2019-01-26 | End: 2019-01-26

## 2019-01-26 RX ORDER — INSULIN ASPART 100 [IU]/ML
0-5 INJECTION, SOLUTION INTRAVENOUS; SUBCUTANEOUS EVERY 6 HOURS PRN
Status: DISCONTINUED | OUTPATIENT
Start: 2019-01-26 | End: 2019-01-27

## 2019-01-26 RX ORDER — PHENYLEPHRINE HCL IN 0.9% NACL 1 MG/10 ML
SYRINGE (ML) INTRAVENOUS
Status: DISPENSED
Start: 2019-01-26 | End: 2019-01-26

## 2019-01-26 RX ORDER — FENTANYL CITRATE-0.9 % NACL/PF 10 MCG/ML
PLASTIC BAG, INJECTION (ML) INTRAVENOUS CONTINUOUS
Status: DISCONTINUED | OUTPATIENT
Start: 2019-01-26 | End: 2019-01-27

## 2019-01-26 RX ORDER — ETOMIDATE 2 MG/ML
INJECTION INTRAVENOUS
Status: COMPLETED
Start: 2019-01-26 | End: 2019-01-26

## 2019-01-26 RX ORDER — MAGNESIUM SULFATE HEPTAHYDRATE 40 MG/ML
4 INJECTION, SOLUTION INTRAVENOUS ONCE
Status: COMPLETED | OUTPATIENT
Start: 2019-01-26 | End: 2019-01-26

## 2019-01-26 RX ORDER — PROPOFOL 10 MG/ML
INJECTION, EMULSION INTRAVENOUS
Status: COMPLETED
Start: 2019-01-26 | End: 2019-01-26

## 2019-01-26 RX ORDER — VASOPRESSIN 20 [USP'U]/ML
INJECTION, SOLUTION INTRAMUSCULAR; SUBCUTANEOUS
Status: COMPLETED
Start: 2019-01-26 | End: 2019-01-26

## 2019-01-26 RX ORDER — NOREPINEPHRINE BITARTRATE/D5W 4MG/250ML
PLASTIC BAG, INJECTION (ML) INTRAVENOUS
Status: DISPENSED
Start: 2019-01-26 | End: 2019-01-26

## 2019-01-26 RX ORDER — HEPARIN SODIUM 5000 [USP'U]/ML
5000 INJECTION, SOLUTION INTRAVENOUS; SUBCUTANEOUS EVERY 8 HOURS
Status: DISCONTINUED | OUTPATIENT
Start: 2019-01-26 | End: 2019-01-28

## 2019-01-26 RX ORDER — FENTANYL CITRATE/PF 250MCG/5ML
25 SYRINGE (ML) INTRAVENOUS
Status: DISCONTINUED | OUTPATIENT
Start: 2019-01-26 | End: 2019-01-26

## 2019-01-26 RX ORDER — MAGNESIUM SULFATE HEPTAHYDRATE 40 MG/ML
2 INJECTION, SOLUTION INTRAVENOUS ONCE
Status: COMPLETED | OUTPATIENT
Start: 2019-01-27 | End: 2019-01-27

## 2019-01-26 RX ORDER — SUCCINYLCHOLINE CHLORIDE 20 MG/ML
INJECTION INTRAMUSCULAR; INTRAVENOUS
Status: COMPLETED
Start: 2019-01-26 | End: 2019-01-26

## 2019-01-26 RX ORDER — FENTANYL CITRATE-0.9 % NACL/PF 10 MCG/ML
PLASTIC BAG, INJECTION (ML) INTRAVENOUS CONTINUOUS
Status: DISCONTINUED | OUTPATIENT
Start: 2019-01-26 | End: 2019-01-26 | Stop reason: ALTCHOICE

## 2019-01-26 RX ORDER — HYDROCODONE BITARTRATE AND ACETAMINOPHEN 500; 5 MG/1; MG/1
TABLET ORAL CONTINUOUS
Status: DISCONTINUED | OUTPATIENT
Start: 2019-01-26 | End: 2019-01-27

## 2019-01-26 RX ORDER — ETOMIDATE 2 MG/ML
10 INJECTION INTRAVENOUS ONCE
Status: COMPLETED | OUTPATIENT
Start: 2019-01-26 | End: 2019-01-26

## 2019-01-26 RX ADMIN — MAGNESIUM SULFATE IN WATER 2 G: 40 INJECTION, SOLUTION INTRAVENOUS at 03:01

## 2019-01-26 RX ADMIN — Medication 0.45 MCG/KG/MIN: at 12:01

## 2019-01-26 RX ADMIN — SODIUM BICARBONATE 50 MEQ: 84 INJECTION, SOLUTION INTRAVENOUS at 12:01

## 2019-01-26 RX ADMIN — VASOPRESSIN 20 UNITS: 20 INJECTION INTRAVENOUS at 09:01

## 2019-01-26 RX ADMIN — DEXMEDETOMIDINE HYDROCHLORIDE 400 MCG: 100 INJECTION, SOLUTION, CONCENTRATE INTRAVENOUS at 05:01

## 2019-01-26 RX ADMIN — ALBUTEROL SULFATE 2.5 MG: 2.5 SOLUTION RESPIRATORY (INHALATION) at 07:01

## 2019-01-26 RX ADMIN — SUCCINYLCHOLINE CHLORIDE 100 MG: 20 INJECTION, SOLUTION INTRAMUSCULAR; INTRAVENOUS at 03:01

## 2019-01-26 RX ADMIN — VASOPRESSIN 0.04 UNITS/MIN: 20 INJECTION INTRAVENOUS at 12:01

## 2019-01-26 RX ADMIN — HEPARIN SODIUM 5000 UNITS: 5000 INJECTION, SOLUTION INTRAVENOUS; SUBCUTANEOUS at 09:01

## 2019-01-26 RX ADMIN — DEXMEDETOMIDINE HYDROCHLORIDE 0.4 MCG/KG/HR: 100 INJECTION, SOLUTION, CONCENTRATE INTRAVENOUS at 09:01

## 2019-01-26 RX ADMIN — SODIUM CHLORIDE 1000 ML: 0.9 INJECTION, SOLUTION INTRAVENOUS at 12:01

## 2019-01-26 RX ADMIN — HYDROCORTISONE SODIUM SUCCINATE 100 MG: 100 INJECTION, POWDER, FOR SOLUTION INTRAMUSCULAR; INTRAVENOUS at 04:01

## 2019-01-26 RX ADMIN — MAGNESIUM SULFATE IN WATER 2 G: 40 INJECTION, SOLUTION INTRAVENOUS at 11:01

## 2019-01-26 RX ADMIN — IPRATROPIUM BROMIDE AND ALBUTEROL SULFATE 3 ML: .5; 3 SOLUTION RESPIRATORY (INHALATION) at 01:01

## 2019-01-26 RX ADMIN — Medication 0.6 MCG/KG/MIN: at 08:01

## 2019-01-26 RX ADMIN — Medication 0.5 MCG/KG/MIN: at 03:01

## 2019-01-26 RX ADMIN — SODIUM CHLORIDE, SODIUM LACTATE, POTASSIUM CHLORIDE, AND CALCIUM CHLORIDE 2000 ML: .6; .31; .03; .02 INJECTION, SOLUTION INTRAVENOUS at 02:01

## 2019-01-26 RX ADMIN — PIPERACILLIN AND TAZOBACTAM 4.5 G: 4; .5 INJECTION, POWDER, LYOPHILIZED, FOR SOLUTION INTRAVENOUS; PARENTERAL at 08:01

## 2019-01-26 RX ADMIN — HYDROCORTISONE SODIUM SUCCINATE 100 MG: 100 INJECTION, POWDER, FOR SOLUTION INTRAMUSCULAR; INTRAVENOUS at 09:01

## 2019-01-26 RX ADMIN — ROCURONIUM BROMIDE: 10 INJECTION, SOLUTION INTRAVENOUS at 02:01

## 2019-01-26 RX ADMIN — Medication 0.02 MCG/KG/MIN: at 03:01

## 2019-01-26 RX ADMIN — Medication 25 MCG/HR: at 10:01

## 2019-01-26 RX ADMIN — VANCOMYCIN HYDROCHLORIDE 1000 MG: 1 INJECTION, POWDER, FOR SOLUTION INTRAVENOUS at 10:01

## 2019-01-26 RX ADMIN — MAGNESIUM SULFATE IN WATER 4 G: 40 INJECTION, SOLUTION INTRAVENOUS at 06:01

## 2019-01-26 RX ADMIN — PANTOPRAZOLE SODIUM 40 MG: 40 INJECTION, POWDER, LYOPHILIZED, FOR SOLUTION INTRAVENOUS at 08:01

## 2019-01-26 RX ADMIN — HYDROCORTISONE SODIUM SUCCINATE 100 MG: 100 INJECTION, POWDER, FOR SOLUTION INTRAMUSCULAR; INTRAVENOUS at 02:01

## 2019-01-26 RX ADMIN — NOREPINEPHRINE BITARTRATE 0.4 MCG/KG/MIN: 1 INJECTION, SOLUTION, CONCENTRATE INTRAVENOUS at 07:01

## 2019-01-26 RX ADMIN — Medication 0.45 MCG/KG/MIN: at 07:01

## 2019-01-26 RX ADMIN — IPRATROPIUM BROMIDE AND ALBUTEROL SULFATE 3 ML: .5; 3 SOLUTION RESPIRATORY (INHALATION) at 07:01

## 2019-01-26 RX ADMIN — PROPOFOL 25 MCG/KG/MIN: 10 INJECTION, EMULSION INTRAVENOUS at 12:01

## 2019-01-26 RX ADMIN — PIPERACILLIN AND TAZOBACTAM 4.5 G: 4; .5 INJECTION, POWDER, LYOPHILIZED, FOR SOLUTION INTRAVENOUS; PARENTERAL at 05:01

## 2019-01-26 RX ADMIN — IPRATROPIUM BROMIDE AND ALBUTEROL SULFATE 3 ML: .5; 3 SOLUTION RESPIRATORY (INHALATION) at 02:01

## 2019-01-26 RX ADMIN — PIPERACILLIN AND TAZOBACTAM 4.5 G: 4; .5 INJECTION, POWDER, LYOPHILIZED, FOR SOLUTION INTRAVENOUS; PARENTERAL at 12:01

## 2019-01-26 RX ADMIN — SUCCINYLCHOLINE CHLORIDE 100 MG: 20 INJECTION INTRAMUSCULAR; INTRAVENOUS at 03:01

## 2019-01-26 RX ADMIN — Medication 0.5 MCG/KG/MIN: at 10:01

## 2019-01-26 RX ADMIN — ETOMIDATE: 2 INJECTION INTRAVENOUS at 02:01

## 2019-01-26 RX ADMIN — Medication 0.5 MCG/KG/MIN: at 05:01

## 2019-01-26 RX ADMIN — SODIUM GLYCOLATE 30 MMOL: 216 INJECTION, SOLUTION INTRAVENOUS at 02:01

## 2019-01-26 RX ADMIN — ETOMIDATE: 2 INJECTION, SOLUTION INTRAVENOUS at 02:01

## 2019-01-26 RX ADMIN — DEXTROSE 1000 MG: 50 INJECTION, SOLUTION INTRAVENOUS at 07:01

## 2019-01-26 RX ADMIN — CALCIUM GLUCONATE: 94 INJECTION, SOLUTION INTRAVENOUS at 09:01

## 2019-01-26 RX ADMIN — DEXTROSE MONOHYDRATE 25 G: 25 INJECTION, SOLUTION INTRAVENOUS at 03:01

## 2019-01-26 RX ADMIN — CALCIUM GLUCONATE 1000 MG: 98 INJECTION, SOLUTION INTRAVENOUS at 03:01

## 2019-01-26 RX ADMIN — VASOPRESSIN 0.04 UNITS/MIN: 20 INJECTION INTRAVENOUS at 04:01

## 2019-01-26 RX ADMIN — VANCOMYCIN HYDROCHLORIDE 1000 MG: 1 INJECTION, POWDER, FOR SOLUTION INTRAVENOUS at 02:01

## 2019-01-26 RX ADMIN — POTASSIUM CHLORIDE 80 MEQ: 200 INJECTION, SOLUTION INTRAVENOUS at 03:01

## 2019-01-26 RX ADMIN — MAGNESIUM SULFATE HEPTAHYDRATE 1 G: 500 INJECTION, SOLUTION INTRAMUSCULAR; INTRAVENOUS at 07:01

## 2019-01-26 RX ADMIN — SODIUM CHLORIDE: 0.9 INJECTION, SOLUTION INTRAVENOUS at 04:01

## 2019-01-26 RX ADMIN — HEPARIN SODIUM 5000 UNITS: 5000 INJECTION, SOLUTION INTRAVENOUS; SUBCUTANEOUS at 02:01

## 2019-01-26 RX ADMIN — PANTOPRAZOLE SODIUM 40 MG: 40 INJECTION, POWDER, LYOPHILIZED, FOR SOLUTION INTRAVENOUS at 09:01

## 2019-01-26 NOTE — PROGRESS NOTES
"[facility]  General Surgery  Progress Note    Subjective:     History of Present Illness:  62 yo W with a history of HTN, COPD on home oxygen, HFpEF, IDDMII, CVA on plavix, HTN, debility after fall and broken hip, and PE who presents to the ED with a several month history of nausea, vomiting, inability to tolerate a diet and weight loss. She has had multiple admissions in the past few months for different ailments. She presents today with continued nausea, vomiting and abdominal pain that is mostly worse in the RLQ. During the examination, she states her pain was all over her abdomen because she was retching so much. She states that she has lost close to 40lbs since her surgery and that she only able to keep broth down. She had a lap converted to open appendectomy back in August 2018 that was complicated by a wound infection, C diff and failure to thrive. This seems to persists. She is now wheelchair bound (per her) and apparently lives in [place] but is here in [place] with her daughter.    She had a CT scan in the ED which revealed an irregular shaped rim enhancing fluid collection measuring at least 4.0 x 3.0 cm ight hemipelvis at the site of prior appendectomy. Surgery was consulted for further recommendations. She was also noted to have a "knot" at the RLQ incision site that is also tender.    Post-Op Info:  Procedure(s) (LRB):  LAPAROTOMY, EXPLORATORY (N/A)  INCISION AND DRAINAGE, ABSCESS-  drainage of pelvic abscess (N/A)  EXCISION, ABSCESS- drainage abdominal wall abscess (N/A)  APPLICATION, WOUND VAC (N/A)   1 Day Post-Op     Interval History: Initially stable post-op in PACU however developed increasing hypotension, increased acidosis. Given fluid (>11L since OR) and transferred to ICU. Intubated given large volume resuscitation and severe acidosis. On levo. Lactate remains >9. Trop very mildly elevated. UOP 10-40 o/n.     Medications:  Continuous Infusions:   dexmedetomidine " (PRECEDEX) infusion 1 mcg/kg/hr (01/26/19 1100)    fentanyl 25 mcg/hr (01/26/19 1100)    norepinephrine bitartrate-D5W 0.4 mcg/kg/min (01/26/19 1100)    TPN ADULT CENTRAL LINE CUSTOM      vasopressin (PITRESSIN) infusion 0.04 Units/min (01/26/19 1100)     Scheduled Meds:   albuterol-ipratropium  3 mL Nebulization Q6H    diltiaZEM  180 mg Oral Daily    fat emulsion 20%  250 mL Intravenous Daily    fluticasone-vilanterol  1 puff Inhalation Daily    heparin (porcine)  5,000 Units Subcutaneous Q8H    pantoprozole (PROTONIX) IV  40 mg Intravenous Q12H    phenylephrine HCl in 0.9% NaCl        piperacillin-tazobactam (ZOSYN) IVPB  4.5 g Intravenous Q8H    potassium chloride  50 mEq Oral Once    potassium chloride  50 mEq Oral Once    potassium phosphate IVPB  30 mmol Intravenous Once    propofol        pyridoxine (vitamin B6)  50 mg Oral Daily    scopolamine  1 patch Transdermal Q3 Days    sodium bicarbonate        theophylline  300 mg Oral Daily    vancomycin (VANCOCIN) IVPB  1,000 mg Intravenous Q12H     PRN Meds:sodium chloride, albuterol sulfate, dextrose 50 % in water (D50W), dextrose 50 % in water (D50W), diclofenac sodium, glucagon (human recombinant), glucose, glucose, ondansetron, promethazine (PHENERGAN) IVPB, sodium chloride 0.9%, sodium chloride 0.9%     Review of patient's allergies indicates:   Allergen Reactions    Ace inhibitors Swelling    Carvedilol      Other reaction(s): Other (See Comments)  blister    Hydralazine analogues     Metformin Nausea And Vomiting    Tetracycline Itching    Tetracyclines Swelling    Travatan (with benzalkonium) [travoprost (benzalkonium)]     Travoprost Itching     Other reaction(s): Other (See Comments)  Blurry vision     Objective:     Vital Signs (Most Recent):  Temp: 97.4 °F (36.3 °C) (01/26/19 1100)  Pulse: 85 (01/26/19 1115)  Resp: (!) 25 (01/26/19 1115)  BP: 118/77 (01/26/19 1100)  SpO2: 100 % (01/26/19 1115) Vital Signs (24h Range):  Temp:   [94.9 °F (34.9 °C)-98.6 °F (37 °C)] 97.4 °F (36.3 °C)  Pulse:  [] 85  Resp:  [14-34] 25  SpO2:  [91 %-100 %] 100 %  BP: ()/(40-77) 118/77  Arterial Line BP: ()/(35-89) 125/67     Weight: 71.2 kg (157 lb)  Body mass index is 29.66 kg/m².    Intake/Output - Last 3 Shifts       01/24 0700 - 01/25 0659 01/25 0700 - 01/26 0659 01/26 0700 - 01/27 0659    P.O. 780      I.V. (mL/kg)  2462 (34.5)     Blood  350 350    Other  3000     IV Piggyback  6250     Total Intake(mL/kg) 780 (10.8) 60484 (168.9) 350 (4.9)    Urine (mL/kg/hr)  785 (0.5) 80 (0.2)    Drains  300 10    Stool  0     Total Output  1085 90    Net +780 +74505 +260           Urine Occurrence 15 x      Stool Occurrence 0 x 0 x     Emesis Occurrence 4 x            Physical Exam   Constitutional: No distress.   HENT:   Head: Normocephalic and atraumatic.   Cardiovascular:   tachycardic   Pulmonary/Chest:   Mechanically ventilated 40%/5 of PEEP   Abdominal:   Abdomen soft, midline incision with prevena vac, pelvic drain serosang  RLQ former incision abscess site clean, packing changed   Neurological:   Arouses with stimulation   Skin: Skin is warm and dry. She is not diaphoretic.       Significant Labs:  CBC:   Recent Labs   Lab 01/26/19  0455   WBC 10.43   RBC 3.37*   HGB 9.0*   HCT 28.1*   *   MCV 83   MCH 26.7*   MCHC 32.0     BMP:   Recent Labs   Lab 01/26/19  1055   *   *   K 4.0      CO2 9*   BUN 2*   CREATININE 0.8   CALCIUM 7.5*   MG 1.2*     ABGs:   Recent Labs   Lab 01/26/19  0854   PH 7.165*   PCO2 31.2*   PO2 164*   HCO3 11.3*   POCSATURATED 99   BE -17     Lactate 9      Assessment/Plan:     Depression    Hold home meds     Hypophosphatemia    Replace      Debility    PT/OT when extubated     Hypokalemia    Replace      Generalized abdominal pain    62 yo female presenting with abdominal pain, nausea, elevated lactate s/p open drainage pelvic abscess 1/25 now with severe sepsis, lactic acidosis    -cont broad  spec abx, f/u cultures  -nephrology consult for SLED given severe acidosis     Severe malnutrition    -cont TPN     Essential hypertension    Hold home meds for now     Gastroesophageal reflux disease without esophagitis    ppi     (HFpEF) heart failure with preserved ejection fraction    Last echo 8/2018 with no WMAs, grade 1DD, EF 60  - strict I/O, daily weights     Moderate asthma without complication    -currently intubated, resume home meds when extubated     CAD (coronary artery disease)    - cards consulted for EKG changes, mild elevation in troponin, f/u recs       Insulin dependent diabetes mellitus    SSI         Sierra Real MD  General Surgery  [facility]

## 2019-01-26 NOTE — PROCEDURES
"Sheryl Martines is a 63 y.o. female patient.    Temp: 96.8 °F (36 °C) (01/25/19 2330)  Pulse: (!) 11 (01/26/19 0119)  Resp: 18 (01/26/19 0119)  BP: (!) 91/56 (01/25/19 2300)  SpO2: (!) 93 % (01/26/19 0119)  Weight: 72 kg (158 lb 11.7 oz) (01/23/19 2300)  Height: 5' 1" (154.9 cm) (01/24/19 0429)       Central Line  Date/Time: 1/26/2019 1:29 AM  Location procedure was performed: [facility] CRITICAL CARE SURGERY  Performed by: Price Mackay MD  Pre-operative Diagnosis: septic shock  Post-operative diagnosis: septic shock  Consent Done: Yes  Time out: Immediately prior to procedure a "time out" was called to verify the correct patient, procedure, equipment, support staff and site/side marked as required.  Indications: med administration, vascular access and hemodynamic monitoring  Anesthesia: local infiltration    Anesthesia:  Local Anesthetic: lidocaine 1% without epinephrine  Preparation: skin prepped with ChloraPrep  Skin prep agent dried: skin prep agent completely dried prior to procedure  Sterile barriers: all five maximum sterile barriers used - cap, mask, sterile gown, sterile gloves, and large sterile sheet  Hand hygiene: hand hygiene performed prior to central venous catheter insertion  Location details: left internal jugular  Catheter type: triple lumen  Catheter size: 7 Fr  Catheter Length: 20cm    Ultrasound guidance: yes  Vessel Caliber: large, patent, compressibility normal  Needle advanced into vessel with real time Ultrasound guidance.  Guidewire confirmed in vessel.  Manometry: Yes  Number of attempts: 1  Post-procedure: line sutured,  chlorhexidine patch,  sterile dressing applied and blood return through all ports  Complications: No          Price Mackay  1/26/2019  "

## 2019-01-26 NOTE — PROCEDURES
"Sheryl Martines is a 63 y.o. female patient.    Temp: 97.4 °F (36.3 °C) (01/26/19 1100)  Pulse: 86 (01/26/19 1215)  Resp: (!) 26 (01/26/19 1215)  BP: 118/77 (01/26/19 1100)  SpO2: 100 % (01/26/19 1215)  Weight: 71.2 kg (157 lb) (01/26/19 0900)  Height: 5' 1" (154.9 cm) (01/26/19 0900)       Central Line  Date/Time: 1/26/2019 1:13 PM  Location procedure was performed: [facility] CRITICAL CARE SURGERY  Performed by: Nunu Lau MD  Assisting provider: Román Henry MD  Pre-operative Diagnosis: Acute Kidney Injury  Post-operative diagnosis: Same  Consent Done: Yes  Time out: Immediately prior to procedure a "time out" was called to verify the correct patient, procedure, equipment, support staff and site/side marked as required.  Indications: hemodialysis  Anesthesia: local infiltration    Anesthesia:  Local Anesthetic: lidocaine 1% with epinephrine  Preparation: skin prepped with ChloraPrep  Location details: right internal jugular  Catheter type: trialysis  Ultrasound guidance: yes  Needle advanced into vessel with real time Ultrasound guidance.  Guidewire confirmed in vessel.  Sterile sheath used.  Manometry: Yes  Estimated blood loss (mL): 10  Specimens: No  Implants: No  Post-procedure: line sutured,  chlorhexidine patch and sterile dressing applied  Complications: No           Trialysis Catheter (Central Line)      Indications, risks, and benefits explained to patient's decision maker (daughter) and verbal informed consent obtained, witnessed by 2 nurses. A time-out was completed verifying correct patient, procedure, and site. The patient was placed in a dependent position. The patients R neck was prepped and draped in sterile fashion. 1% Lidocaine was used to anesthetize the surrounding skin area. A central venous catheter was introduced into the the internal jugular vein using the Seldinger technique and under ultrasound guidance. The catheter was threaded smoothly over the guide wire and appropriate " blood return was obtained. Venous access confirmed with ultrasound and manometry. Each lumen of the catheter was evacuated of air and flushed with sterile saline prior to insertion. The catheter was then sutured in place to the skin and a sterile dressing applied. Perfusion to the extremity distal to the point of catheter insertion was checked and found to be adequate. Blood loss was minimal and the patient tolerated the procedure well and there were no complications. Propofol gtt used for sedation    Supervision: Javier Mayogra      Xray ordered for confirmation.    Nunu Umanzor  1/26/2019

## 2019-01-26 NOTE — ASSESSMENT & PLAN NOTE
64 yo female presenting with abdominal pain, nausea, elevated lactate s/p open drainage pelvic abscess 1/25 now with severe sepsis, lactic acidosis    -cont broad spec abx, f/u cultures  -nephrology consult for SLED given severe acidosis

## 2019-01-26 NOTE — NURSING TRANSFER
Nursing Transfer Note      1/25/2019     Transfer To: [id number]    Transfer via bed    Transfer with transfer monitor, O2, iv pump, wound vac    Transported by RN    Medicines sent: zosyn, myles    Chart send with patient: Yes    Notified: daughter    Patient reassessed at: 1/25/19 @ 2304

## 2019-01-26 NOTE — HPI
Ms. Sheryl Martines is a 62 yo AAF with HFpEF, HTN, COPD, DM, hx of CVA, and NIKHIL in the past who presented to the hospital on 1/23/19 with chief complaint of worsening nasuea/vomiting for several months.  Per history/chart review, patient underwent an appendectomy in summer of 2018, complicated by post-op nausea and vomiting with weight loss.  She had a CT scan in the ED which revealed an irregular shaped rim enhancing fluid collection measuring at least 4.0 x 3.0 cm in the right hemipelvis at the site of prior appendectomy. Surgery was consulted and she underwent Exp/Lap with abscess drainage.  She was transferred to PACU post-op for recovery and noted to be hypotensive and SOB (SBP 70's).  She was volume resuscitated with 7 L of crystalloids.  Labs revealed a lactate, peaked at 10.1 and she was started on levo/vaso for BP support, later intubated for respiratory failure.  UOP averaging around 20-30 cc/hr on morning of consultation, but slowly trended down and now anuric x 3 hours.  ABG revealing a compensated metabolic acidosis with pH of 7.26 (bicarb of 10) and Nephrology has been consulted for evaluation for RRT.

## 2019-01-26 NOTE — PROGRESS NOTES
Attempted to call daughter again to update her on patient's critical status, no response. Will try again in a few hours.     Price Mackay MD  PGY2, Anesthesiology  SICU

## 2019-01-26 NOTE — PROGRESS NOTES
Notified Dr. Mackay of critical pH level 7.223.  No verbal orders received.  Will continue to monitor

## 2019-01-26 NOTE — EICU
Rapid sequence intubation by Dr. Mackay (Anes Res)    0230- Etomidate 10mg IVP/ Succinylcholine 100mg IVP    0232- Tube placed without issue.

## 2019-01-26 NOTE — PROGRESS NOTES
Notified of lactic acid level 9.4 by Shanique in lab.  Called Dr. Codie VILLALOBOS and reported level.  No new orders received, will continue to monitor

## 2019-01-26 NOTE — PROGRESS NOTES
Sent pharmacy a message at 2140 asking for new bag of myles-synephrine.  Called pharmacy at this time to stress STAT need for medication.  Will continue to monitor

## 2019-01-26 NOTE — SUBJECTIVE & OBJECTIVE
Past Medical History:   Diagnosis Date    Abnormal LFTs 12/14/2018    Asthma     Closed compression fracture of fourth lumbar vertebra     COPD (chronic obstructive pulmonary disease)     Coronary artery disease     Diabetes mellitus     Fall 10/4/2018    Fracture     right tib & fib    Glaucoma     High cholesterol     Hypertension     Infected incision 9/20/2018    Intractable nausea and vomiting 1/23/2019    Iritis     Pulmonary embolus     S/P appendectomy 9/11/2018    Stroke     rt sided weakness.       Past Surgical History:   Procedure Laterality Date    ABDOMINAL SURGERY      APPENDECTOMY N/A 8/26/2018    Performed by Bernadine Melendrez MD at [facility] OR    APPENDECTOMY, LAPAROSCOPIC---CONVERTED TO OPEN APPENDECTOMY @0950 N/A 8/26/2018    Performed by [facility]ine [facility]rez [facility] at [facility] OR    BACK SURGERY      stimulator    CATARACT EXTRACTION      HYSTERECTOMY      TOTAL HIP ARTHROPLASTY Left     2008       Review of patient's allergies indicates:   Allergen Reactions    Ace inhibitors Swelling    Carvedilol      Other reaction(s): Other (See Comments)  blister    Hydralazine analogues     Metformin Nausea And Vomiting    Tetracycline Itching    Tetracyclines Swelling    Travatan (with benzalkonium) [travoprost (benzalkonium)]     Travoprost Itching     Other reaction(s): Other (See Comments)  Blurry vision       No current facility-administered medications on file prior to encounter.      Current Outpatient Medications on File Prior to Encounter   Medication Sig    acetaminophen (TYLENOL) 500 MG tablet Take 1,000 mg by mouth 2 (two) times daily as needed for Pain.    albuterol (PROVENTIL/VENTOLIN HFA) 90 mcg/actuation inhaler Inhale 2 puffs into the lungs every 6 (six) hours as needed for Wheezing or Shortness of Breath. Rescue    albuterol-ipratropium (DUO-NEB) 2.5 mg-0.5 mg/3 mL nebulizer solution Take 3 mLs by nebulization every 4 (four) hours as needed for Wheezing or  Shortness of Breath. Rescue     aspirin (ECOTRIN) 81 MG EC tablet Take 1 tablet (81 mg total) by mouth once daily.    baclofen (LIORESAL) 10 MG tablet Take 10 mg by mouth 2 (two) times daily as needed (MUSCLE SPASMS).    cefdinir (OMNICEF) 300 MG capsule TK ONE C PO  BID FOR 7 DAYS    cephALEXin (KEFLEX) 750 MG capsule TK ONE C PO TID FOR 5 DAYS    ciprofloxacin HCl (CIPRO) 250 MG tablet Take 1 tablet (250 mg total) by mouth every 12 (twelve) hours. Starting 12/15 evening    clopidogrel (PLAVIX) 75 mg tablet Take 75 mg by mouth once daily.    diclofenac sodium (VOLTAREN) 1 % Gel Apply 2 g topically daily as needed (PAIN).    diltiaZEM (CARDIZEM CD) 180 MG 24 hr capsule Take 180 mg by mouth once daily.    DULoxetine (CYMBALTA) 60 MG capsule Take 60 mg by mouth once daily.    fluticasone-vilanterol (BREO ELLIPTA) 100-25 mcg/dose diskus inhaler Inhale 1 puff into the lungs once daily. Controller    gabapentin (NEURONTIN) 300 MG capsule Take 300 mg by mouth once daily.    Lactobacillus rhamnosus-fiber 2.5 billion cell-3.5 gram PwPk Take 1 capsule by mouth.    lansoprazole (PREVACID) 30 MG capsule Take 30 mg by mouth once daily.    losartan (COZAAR) 100 MG tablet Take 100 mg by mouth once daily.     ondansetron (ZOFRAN) 4 MG tablet Take 1 tablet (4 mg total) by mouth every 6 (six) hours as needed.    ondansetron (ZOFRAN) 4 MG tablet Take 4-8 mg by mouth.    potassium chloride SA (K-DUR,KLOR-CON) 10 MEQ tablet Take 10 mEq by mouth.    predniSONE (DELTASONE) 10 MG tablet Take 4 tablets (40 mg) on 12/16, then take 1 tablet (10 mg) daily    pyridoxine, vitamin B6, (VITAMIN B-6) 50 MG Tab Take 1 tablet (50 mg total) by mouth once daily.    simvastatin (ZOCOR) 40 MG tablet Take 40 mg by mouth.    theophylline (THEODUR) 300 mg 24 hr capsule Take 300 mg by mouth once daily.    traMADol (ULTRAM) 50 mg tablet TK 1 T PO BID PRN     Family History     Problem Relation (Age of Onset)    Cancer Father    Diabetes  Brother    Lupus Sister    Multiple sclerosis Mother        Tobacco Use    Smoking status: Never Smoker    Smokeless tobacco: Never Used   Substance and Sexual Activity    Alcohol use: No     Frequency: Never    Drug use: No    Sexual activity: No     Partners: Male     Review of Systems   Unable to perform ROS: intubated     Objective:     Vital Signs (Most Recent):  Temp: 97.4 °F (36.3 °C) (01/26/19 1100)  Pulse: 86 (01/26/19 1215)  Resp: (!) 26 (01/26/19 1215)  BP: 118/77 (01/26/19 1100)  SpO2: 100 % (01/26/19 1215) Vital Signs (24h Range):  Temp:  [94.9 °F (34.9 °C)-98.6 °F (37 °C)] 97.4 °F (36.3 °C)  Pulse:  [] 86  Resp:  [14-34] 26  SpO2:  [91 %-100 %] 100 %  BP: ()/(40-77) 118/77  Arterial Line BP: ()/(35-89) 102/57     Weight: 71.2 kg (157 lb)  Body mass index is 29.66 kg/m².    SpO2: 100 %  O2 Device (Oxygen Therapy): ventilator      Intake/Output Summary (Last 24 hours) at 1/26/2019 1324  Last data filed at 1/26/2019 1200  Gross per 24 hour   Intake 41654 ml   Output 1100 ml   Net 9412 ml       Lines/Drains/Airways     Central Venous Catheter Line                 Port A Cath Single Lumen 01/23/19 1400 right subclavian 2 days         Percutaneous Central Line Insertion/Assessment - triple lumen  01/26/19 0010 left internal jugular less than 1 day          Drain                 Closed/Suction Drain 01/25/19 1020 Right;Ventral Abdomen Bulb 19 Fr. 1 day         Urethral Catheter 01/25/19 0934 Straight-tip;Non-latex 16 Fr. 1 day          Airway                 Airway - Non-Surgical 01/26/19 Endotracheal Tube less than 1 day          Arterial Line                 Arterial Line 01/25/19 1049 Left Radial 1 day          Pressure Ulcer                 Negative Pressure Wound Therapy  1 day                Physical Exam   Constitutional: She is oriented to person, place, and time. She appears well-developed and well-nourished.   HENT:   Head: Normocephalic and atraumatic.   Nose: Nose normal.    Mouth/Throat: No oropharyngeal exudate.   Eyes: Right eye exhibits no discharge. Left eye exhibits no discharge. No scleral icterus.   Neck: Normal range of motion. Neck supple. No JVD present.   Cardiovascular: Normal rate, regular rhythm, S1 normal and S2 normal. Exam reveals no gallop, no S3, no S4, no distant heart sounds, no friction rub, no midsystolic click and no opening snap.   No murmur heard.  Pulses:       Radial pulses are 2+ on the right side, and 2+ on the left side.        Femoral pulses are 2+ on the right side, and 2+ on the left side.  Pulmonary/Chest: Effort normal. No respiratory distress. She has no wheezes. She has rales. She exhibits no tenderness.   Abdominal: Soft. Bowel sounds are normal. She exhibits distension. There is no tenderness. There is no rebound.   Musculoskeletal: Normal range of motion. She exhibits no edema, tenderness or deformity.   Lymphadenopathy:     She has no cervical adenopathy.   Neurological: She is alert and oriented to person, place, and time. No cranial nerve deficit.   Skin: Skin is warm and dry.   Psychiatric: She has a normal mood and affect. Her behavior is normal.       Significant Labs: All pertinent lab results from the last 24 hours have been reviewed.    Significant Imaging: All pertinent images from the last 24h have been reviewed.

## 2019-01-26 NOTE — ASSESSMENT & PLAN NOTE
NIKHIL on normal renal function    62 yo AAM who presented with worsening nausea/vomiting for several months.  Imaging revealed possible abscess to past appendectomy site and she underwent exp/lap with abscess drainage on 1/25.  Post-op course complicated by hypotension and lactic acidosis.  She was volume resuscitated with 5L of crystalloids.  On the morning of 1/26, UOP has slowly trended down and now with anuria.     NIKHIL 2/2 Ischemic ATN vs. Septic ATN    ABG with compensated metabolic acidosis with pH of 7.26    Plan/recommendations:  -repeat RFP with mag/phos  -CPK  -strict I/O's  -recommend placement of temporary dialysis catheter  -will initiate SLED for metabolic clearance once access obtained.

## 2019-01-26 NOTE — PROCEDURES
"Sheryl Martines is a 63 y.o. female patient.    Temp: 96.8 °F (36 °C) (01/25/19 2330)  Pulse: 106 (01/26/19 0239)  Resp: 14 (01/26/19 0239)  BP: (!) 91/56 (01/25/19 2300)  SpO2: 100 % (01/26/19 0239)  Weight: 72 kg (158 lb 11.7 oz) (01/23/19 2300)  Height: 5' 1" (154.9 cm) (01/24/19 0429)       Intubation  Date/Time: 1/26/2019 3:38 AM  Location procedure was performed: [facility] CRITICAL CARE SURGERY  Performed by: Price Mackay MD  Authorized by: Price Mackay MD   Consent Done: Emergent Situation  Indications: airway protection,  hypoxemia and  respiratory failure  Intubation method: video-assisted  Patient status: paralyzed (RSI)  Preoxygenation: BVM  Sedatives: etomidate  Paralytic: succinylcholine  Laryngoscope size: Mac 4  Tube size: 7.0 mm  Tube type: cuffed  Number of attempts: 1  Breath sounds: clear  Cuff inflated: yes  ETT to lip: 22 cm  Tube secured with: ETT marcelo          Price Mackay  1/26/2019  "

## 2019-01-26 NOTE — PROGRESS NOTES
Pt. Emergently intubated with size 7.0ETT and secured 22cm at lip.Currently on AC ventilatory support.

## 2019-01-26 NOTE — HPI
62 yo female with CAD, HTN, LV diastolic dysfunction, asthma, COPD, T2DM, CVA 2012 with R sided deficits, severe debility, and lap converted to open appendectomy 8/2018 c/b C diff, FTT, and a round infection presents to the ED for acute on chronic increased generalized abdominal pain with associated NBNB emesis, nausea and decreased appetite.   CT showed:    Posterior right hemipelvis rim enhancing fluid collection, suspicious for abscess.  Possible associated retained appendicolith.    Adjacent urinary bladder wall thickening, suspicious for superimposed cystitis.    She has received vanc/zosyn and underwent exploratory laparotomy with fecalith removal yesterday.    Mg today was 1.2 and Phos 1.7    EKG/troponins were obtained in routine post op fashion and EKG showed an accelerated idioventricular rhythm and troponins were mildly elevated 0.03 -0.08. She has multiple medical problems at this point, including sepsis with severe metabolic acidosis (pH 7.26/22/117, LA 9, and Bicarb 9). Patient is currently on norepinephrine and scheduled duonebs. CXR showed effusion and congestion.    TTE (August 2018):    1 - Concentric remodeling.     2 - No wall motion abnormalities.     3 - Normal left ventricular systolic function (EF 60-65%).     4 - Impaired LV relaxation, normal LAP (grade 1 diastolic dysfunction).     5 - Normal right ventricular systolic function .     6 - The estimated PA systolic pressure is 14 mmHg.     7 - No evidence of intracardiac shunt.     TTE from today is pending.    She is currently intubated and sedated

## 2019-01-26 NOTE — ASSESSMENT & PLAN NOTE
Patient with hx of CAD who is s/p laparotomy for abdominal infection with sepsis and severe lactic acidosis, on pressors and scheduled duonebs. CXR showed pulm congestion and/or effusion.    This is likely type 2 NSTEMI, meaning demand ischemia likely due to all concomitant medical problems. We recommend the following:    -Treat underlying metabolic acidosis and sepsis to improve cardiac function  -Replace electrolytes to keep K>4, Mg>2, Phos>3. Make sure the replacement is effective  -Wean pressors as tolerated  -F/U TTE

## 2019-01-26 NOTE — ASSESSMENT & PLAN NOTE
Patient with hx of CAD who is s/p laparotomy for abdominal infection with sepsis and severe lactic acidosis, on pressors and scheduled duonebs. CXR showed pulm congestion and/or effusion. Troponin at a low level, no significant findings on echo, decreasing enzymes with hemodynamic stabilization confirm the diagnosis of myocardial injury due to all concomitant medical/surgical problems. EKG showed accelerated idioventricular rhythm which is resolving. We recommend the following:    - Continue supportive care as per primary and consulting teams  - will sign off, thank you for the consult, please call back with any questions

## 2019-01-26 NOTE — SUBJECTIVE & OBJECTIVE
Interval History: Initially stable post-op in PACU however developed increasing hypotension, increased acidosis. Given fluid (>11L since OR) and transferred to ICU. Intubated given large volume resuscitation and severe acidosis. On levo. Lactate remains >9. Trop very mildly elevated. UOP 10-40 o/n.     Medications:  Continuous Infusions:   dexmedetomidine (PRECEDEX) infusion 1 mcg/kg/hr (01/26/19 1100)    fentanyl 25 mcg/hr (01/26/19 1100)    norepinephrine bitartrate-D5W 0.4 mcg/kg/min (01/26/19 1100)    TPN ADULT CENTRAL LINE CUSTOM      vasopressin (PITRESSIN) infusion 0.04 Units/min (01/26/19 1100)     Scheduled Meds:   albuterol-ipratropium  3 mL Nebulization Q6H    diltiaZEM  180 mg Oral Daily    fat emulsion 20%  250 mL Intravenous Daily    fluticasone-vilanterol  1 puff Inhalation Daily    heparin (porcine)  5,000 Units Subcutaneous Q8H    pantoprozole (PROTONIX) IV  40 mg Intravenous Q12H    phenylephrine HCl in 0.9% NaCl        piperacillin-tazobactam (ZOSYN) IVPB  4.5 g Intravenous Q8H    potassium chloride  50 mEq Oral Once    potassium chloride  50 mEq Oral Once    potassium phosphate IVPB  30 mmol Intravenous Once    propofol        pyridoxine (vitamin B6)  50 mg Oral Daily    scopolamine  1 patch Transdermal Q3 Days    sodium bicarbonate        theophylline  300 mg Oral Daily    vancomycin (VANCOCIN) IVPB  1,000 mg Intravenous Q12H     PRN Meds:sodium chloride, albuterol sulfate, dextrose 50 % in water (D50W), dextrose 50 % in water (D50W), diclofenac sodium, glucagon (human recombinant), glucose, glucose, ondansetron, promethazine (PHENERGAN) IVPB, sodium chloride 0.9%, sodium chloride 0.9%     Review of patient's allergies indicates:   Allergen Reactions    Ace inhibitors Swelling    Carvedilol      Other reaction(s): Other (See Comments)  blister    Hydralazine analogues     Metformin Nausea And Vomiting    Tetracycline Itching    Tetracyclines Swelling    Travatan  (with benzalkonium) [travoprost (benzalkonium)]     Travoprost Itching     Other reaction(s): Other (See Comments)  Blurry vision     Objective:     Vital Signs (Most Recent):  Temp: 97.4 °F (36.3 °C) (01/26/19 1100)  Pulse: 85 (01/26/19 1115)  Resp: (!) 25 (01/26/19 1115)  BP: 118/77 (01/26/19 1100)  SpO2: 100 % (01/26/19 1115) Vital Signs (24h Range):  Temp:  [94.9 °F (34.9 °C)-98.6 °F (37 °C)] 97.4 °F (36.3 °C)  Pulse:  [] 85  Resp:  [14-34] 25  SpO2:  [91 %-100 %] 100 %  BP: ()/(40-77) 118/77  Arterial Line BP: ()/(35-89) 125/67     Weight: 71.2 kg (157 lb)  Body mass index is 29.66 kg/m².    Intake/Output - Last 3 Shifts       01/24 0700 - 01/25 0659 01/25 0700 - 01/26 0659 01/26 0700 - 01/27 0659    P.O. 780      I.V. (mL/kg)  2462 (34.5)     Blood  350 350    Other  3000     IV Piggyback  6250     Total Intake(mL/kg) 780 (10.8) 14100 (168.9) 350 (4.9)    Urine (mL/kg/hr)  785 (0.5) 80 (0.2)    Drains  300 10    Stool  0     Total Output  1085 90    Net +780 +97807 +260           Urine Occurrence 15 x      Stool Occurrence 0 x 0 x     Emesis Occurrence 4 x            Physical Exam   Constitutional: No distress.   HENT:   Head: Normocephalic and atraumatic.   Cardiovascular:   tachycardic   Pulmonary/Chest:   Mechanically ventilated 40%/5 of PEEP   Abdominal:   Abdomen soft, midline incision with prevena vac, pelvic drain serosang  RLQ former incision abscess site clean, packing changed   Neurological:   Arouses with stimulation   Skin: Skin is warm and dry. She is not diaphoretic.       Significant Labs:  CBC:   Recent Labs   Lab 01/26/19  0455   WBC 10.43   RBC 3.37*   HGB 9.0*   HCT 28.1*   *   MCV 83   MCH 26.7*   MCHC 32.0     BMP:   Recent Labs   Lab 01/26/19  1055   *   *   K 4.0      CO2 9*   BUN 2*   CREATININE 0.8   CALCIUM 7.5*   MG 1.2*     ABGs:   Recent Labs   Lab 01/26/19  0854   PH 7.165*   PCO2 31.2*   PO2 164*   HCO3 11.3*   POCSATURATED 99   BE -17      Lactate 9

## 2019-01-26 NOTE — CONSULTS
[facility]  Nephrology  Consult Note    Patient Name: Sheryl Martines  MRN: [id number]  Admission Date: 1/23/2019  Hospital Length of Stay: 2 days  Attending Provider: Monster Laurent MD   Primary Care Physician: Primary Doctor No  Principal Problem:Pelvic abscess in female    Inpatient consult to Nephrology  Consult performed by: Miah Anand NP  Consult ordered by: Price Mackay MD  Reason for consult: NIKHIL with metabolic acidosis        Subjective:     HPI: Ms. Sheryl Martines is a 64 yo AAF with HFpEF, HTN, COPD, DM, hx of CVA, and NIKHIL in the past who presented to the hospital on 1/23/19 with chief complaint of worsening nasuea/vomiting for several months.  Per history/chart review, patient underwent an appendectomy in summer of 2018, complicated by post-op nausea and vomiting with weight loss.  She had a CT scan in the ED which revealed an irregular shaped rim enhancing fluid collection measuring at least 4.0 x 3.0 cm in the right hemipelvis at the site of prior appendectomy. Surgery was consulted and she underwent Exp/Lap with abscess drainage.  She was transferred to PACU post-op for recovery and noted to be hypotensive and SOB (SBP 70's).  She was volume resuscitated with 7 L of crystalloids.  Labs revealed a lactate, peaked at 10.1 and she was started on levo/vaso for BP support, later intubated for respiratory failure.  UOP averaging around 20-30 cc/hr on morning of consultation, but slowly trended down and now anuric x 3 hours.  ABG revealing a compensated metabolic acidosis with pH of 7.26 (bicarb of 10) and Nephrology has been consulted for evaluation for RRT.        Past Medical History:   Diagnosis Date    Abnormal LFTs 12/14/2018    Asthma     Closed compression fracture of fourth lumbar vertebra     COPD (chronic obstructive pulmonary disease)     Coronary artery disease     Diabetes mellitus     Fall 10/4/2018    Fracture     right tib & fib    Glaucoma      High cholesterol     Hypertension     Infected incision 9/20/2018    Intractable nausea and vomiting 1/23/2019    Iritis     Pulmonary embolus     S/P appendectomy 9/11/2018    Stroke     rt sided weakness.       Past Surgical History:   Procedure Laterality Date    ABDOMINAL SURGERY      APPENDECTOMY N/A 8/26/2018    Performed [facility] at [facility] OR    APPENDECTOMY, LAPAROSCOPIC---CONVERTED TO OPEN APPENDECTOMY @0950 N/A 8/26/2018    Performed by [facility] at [facility] OR    BACK SURGERY      stimulator    CATARACT EXTRACTION      HYSTERECTOMY      TOTAL HIP ARTHROPLASTY Left     2008       Review of patient's allergies indicates:   Allergen Reactions    Ace inhibitors Swelling    Carvedilol      Other reaction(s): Other (See Comments)  blister    Hydralazine analogues     Metformin Nausea And Vomiting    Tetracycline Itching    Tetracyclines Swelling    Travatan (with benzalkonium) [travoprost (benzalkonium)]     Travoprost Itching     Other reaction(s): Other (See Comments)  Blurry vision     Current Facility-Administered Medications   Medication Frequency    0.9%  NaCl infusion (for blood administration) Q24H PRN    albuterol sulfate nebulizer solution 2.5 mg Q4H PRN    albuterol-ipratropium 2.5 mg-0.5 mg/3 mL nebulizer solution 3 mL Q6H    dexmedetomidine (PRECEDEX) 400mcg/100mL 0.9% NaCL infusion Continuous    dextrose 50 % in water (D50W) injection 12.5 g PRN    dextrose 50 % in water (D50W) injection 25 g PRN    diclofenac sodium 1 % gel 2 g Daily PRN    diltiaZEM 24 hr capsule 180 mg Daily    fat emulsion 20% infusion 250 mL Daily    fentaNYL 2500 mcg in 0.9% sodium chloride 250 mL infusion premix (titrating) Continuous    fluticasone-vilanterol 100-25 mcg/dose diskus inhaler 1 puff Daily    glucagon (human recombinant) injection 1 mg PRN    glucose chewable tablet 16 g PRN    glucose chewable tablet 24 g PRN    heparin (porcine) injection 5,000  Units Q8H    norepinephrine 4 mg in dextrose 5% 250 mL infusion (premix) (titrating) Continuous    ondansetron injection 4 mg Q8H PRN    pantoprazole injection 40 mg Q12H    phenylephrine HCl in 0.9% NaCl 1 mg/10 mL (100 mcg/mL) syringe     piperacillin-tazobactam 4.5 g in sodium chloride 0.9% 100 mL IVPB (ready to mix system) Q8H    potassium chloride CR capsule 50 mEq Once    potassium chloride CR capsule 50 mEq Once    potassium phosphate 30 mmol in dextrose 5 % 500 mL infusion Once    promethazine (PHENERGAN) 6.25 mg in dextrose 5 % 50 mL IVPB Q6H PRN    propofol (DIPRIVAN) 10 mg/mL infusion     pyridoxine (vitamin B6) tablet 50 mg Daily    scopolamine 1.3-1.5 mg (1 mg over 3 days) 1 patch Q3 Days    sodium bicarbonate 1 mEq/mL (8.4 %) solution     sodium chloride 0.9% flush 5 mL PRN    sodium chloride 0.9% flush 5 mL PRN    theophylline 24 hr capsule 300 mg Daily    TPN ADULT CENTRAL LINE CUSTOM Continuous    vancomycin in dextrose 5 % 1 gram/250 mL IVPB 1,000 mg Q12H    vasopressin (PITRESSIN) 0.2 Units/mL in dextrose 5 % 100 mL infusion Continuous     Family History     Problem Relation (Age of Onset)    Cancer Father    Diabetes Brother    Lupus Sister    Multiple sclerosis Mother        Tobacco Use    Smoking status: Never Smoker    Smokeless tobacco: Never Used   Substance and Sexual Activity    Alcohol use: No     Frequency: Never    Drug use: No    Sexual activity: No     Partners: Male     Review of Systems   Unable to perform ROS: Intubated     Objective:     Vital Signs (Most Recent):  Temp: 97.7 °F (36.5 °C) (01/26/19 0745)  Pulse: 85 (01/26/19 1115)  Resp: (!) 25 (01/26/19 1115)  BP: 118/77 (01/26/19 1100)  SpO2: 100 % (01/26/19 1115)  O2 Device (Oxygen Therapy): ventilator (01/26/19 1103) Vital Signs (24h Range):  Temp:  [94.9 °F (34.9 °C)-98.6 °F (37 °C)] 97.7 °F (36.5 °C)  Pulse:  [] 85  Resp:  [14-34] 25  SpO2:  [91 %-100 %] 100 %  BP: ()/(40-77)  118/77  Arterial Line BP: ()/(35-89) 125/67     Weight: 71.2 kg (157 lb) (01/26/19 0900)  Body mass index is 29.66 kg/m².  Body surface area is 1.75 meters squared.    I/O last 3 completed shifts:  In: 56704 [P.O.:480; I.V.:2462; Blood:350; Other:3000; IV Piggyback:6250]  Out: 1125 [Urine:825; Drains:300]    Physical Exam   Constitutional: She appears well-developed. She appears ill. No distress. She is sedated and intubated.   HENT:   Head: Normocephalic and atraumatic.   Right Ear: External ear normal.   Left Ear: External ear normal.   Eyes: Conjunctivae are normal. Right eye exhibits no discharge. Left eye exhibits no discharge.   Neck: Neck supple.   Cardiovascular: Normal rate and regular rhythm. Exam reveals no gallop and no friction rub.   No murmur heard.  Pulmonary/Chest: Breath sounds normal. She is intubated. She has no wheezes. She has no rales.   Abdominal:   LUCIANO drain/wound vac to RLQ   Musculoskeletal: She exhibits edema (upper extremities). She exhibits no deformity.   Skin: Skin is warm. She is not diaphoretic.       Significant Labs:  ABGs:   Recent Labs   Lab 01/26/19  0854   PH 7.165*   PCO2 31.2*   HCO3 11.3*   POCSATURATED 99   BE -17     CBC:   Recent Labs   Lab 01/26/19  0455   WBC 10.43   RBC 3.37*   HGB 9.0*   HCT 28.1*   *   MCV 83   MCH 26.7*   MCHC 32.0     CMP:   Recent Labs   Lab 01/26/19  0455   *   CALCIUM 7.6*   ALBUMIN 1.4*   PROT 3.4*   *   K 3.8   CO2 12*      BUN 2*   CREATININE 0.7   ALKPHOS 245*   ALT 10   AST 29   BILITOT 1.3*         Assessment/Plan:     NIKHIL (acute kidney injury)    NIKHIL on normal renal function    62 yo AAM who presented with worsening nausea/vomiting for several months.  Imaging revealed possible abscess to past appendectomy site and she underwent exp/lap with abscess drainage on 1/25.  Post-op course complicated by hypotension and lactic acidosis.  She was volume resuscitated with 5L of crystalloids.  On the morning of 1/26,  UOP has slowly trended down and now with anuria.     NIKHIL 2/2 Ischemic ATN vs. Septic ATN    ABG with compensated metabolic acidosis with pH of 7.26    Plan/recommendations:  -repeat RFP with mag/phos  -CPK  -strict I/O's  -recommend placement of temporary dialysis catheter  -will initiate SLED for metabolic clearance once access obtained.       Miah Montanez, SAMUEL  Nephrology  [facility]

## 2019-01-26 NOTE — PROGRESS NOTES
Notified Dr. Mackay of pts intolerance of oral meds, suggested switching scheduled oral  potassium medication to IV.  Awaiting insertion of central line.  Will continue to monitor

## 2019-01-26 NOTE — ANESTHESIA POSTPROCEDURE EVALUATION
"Anesthesia Post Evaluation    Patient: Sheryl Martines was found to have intraabdominal abscess. In PACU had poor respiratory drive after extubation with transient desaturation which improved on BiPAP. Hypotension treated with IV fluid bolus. No rales or distress noted. Surgery team aware. LABS sent.     Procedure(s) Performed: Procedure(s) (LRB):  LAPAROTOMY, EXPLORATORY (N/A)  INCISION AND DRAINAGE, ABSCESS-  drainage of pelvic abscess (N/A)  EXCISION, ABSCESS- drainage abdominal wall abscess (N/A)  APPLICATION, WOUND VAC (N/A)    Final Anesthesia Type: general  Patient location during evaluation: PACU  Patient participation: No - Unable to Participate, Sedation  Level of consciousness: awake  Post-procedure vital signs: reviewed and stable  Pain management: adequate  Airway patency: patent  PONV status at discharge: No PONV  Anesthetic complications: yes (Placed on BiPAP for somnolence and weakness. Responded well. )  Perioperative Events: other periop events (see comments)  Yoon-operative Events Comments: Placed on BiPAP for somnolence and weakness. Responded well.   Cardiovascular status: hypotensive  Respiratory status: unassisted  Hydration status: euvolemic  Follow-up not needed.        Visit Vitals  /77 (BP Location: Right arm, Patient Position: Lying)   Pulse 85   Temp 36.3 °C (97.4 °F) (Oral)   Resp (!) 25   Ht 5' 1" (1.549 m)   Wt 71.2 kg (157 lb)   LMP  (LMP Unknown)   SpO2 100%   Breastfeeding? No   BMI 29.66 kg/m²       Pain/Kyle Score: Pain Rating Prior to Med Admin: 10 (1/26/2019 10:56 AM)  Kyle Score: 7 (1/25/2019  9:00 PM)        "

## 2019-01-26 NOTE — SUBJECTIVE & OBJECTIVE
Past Medical History:   Diagnosis Date    Abnormal LFTs 12/14/2018    Asthma     Closed compression fracture of fourth lumbar vertebra     COPD (chronic obstructive pulmonary disease)     Coronary artery disease     Diabetes mellitus     Fall 10/4/2018    Fracture     right tib & fib    Glaucoma     High cholesterol     Hypertension     Infected incision 9/20/2018    Intractable nausea and vomiting 1/23/2019    Iritis     Pulmonary embolus     S/P appendectomy 9/11/2018    Stroke     rt sided weakness.       Past Surgical History:   Procedure Laterality Date    ABDOMINAL SURGERY      APPENDECTOMY N/A 8/26/2018    Per[facility]    APPENDECTOMY, LAPAROSCOPIC---CONVERTED TO OPEN APPENDECTOMY @0950 N/A 8/26/2018    Performed by [facility] at [facility] OR    BACK SURGERY      stimulator    CATARACT EXTRACTION      HYSTERECTOMY      TOTAL HIP ARTHROPLASTY Left     2008       Review of patient's allergies indicates:   Allergen Reactions    Ace inhibitors Swelling    Carvedilol      Other reaction(s): Other (See Comments)  blister    Hydralazine analogues     Metformin Nausea And Vomiting    Tetracycline Itching    Tetracyclines Swelling    Travatan (with benzalkonium) [travoprost (benzalkonium)]     Travoprost Itching     Other reaction(s): Other (See Comments)  Blurry vision     Current Facility-Administered Medications   Medication Frequency    0.9%  NaCl infusion (for blood administration) Q24H PRN    albuterol sulfate nebulizer solution 2.5 mg Q4H PRN    albuterol-ipratropium 2.5 mg-0.5 mg/3 mL nebulizer solution 3 mL Q6H    dexmedetomidine (PRECEDEX) 400mcg/100mL 0.9% NaCL infusion Continuous    dextrose 50 % in water (D50W) injection 12.5 g PRN    dextrose 50 % in water (D50W) injection 25 g PRN    diclofenac sodium 1 % gel 2 g Daily PRN    diltiaZEM 24 hr capsule 180 mg Daily    fat emulsion 20% infusion 250 mL Daily    fentaNYL 2500  mcg in 0.9% sodium chloride 250 mL infusion premix (titrating) Continuous    fluticasone-vilanterol 100-25 mcg/dose diskus inhaler 1 puff Daily    glucagon (human recombinant) injection 1 mg PRN    glucose chewable tablet 16 g PRN    glucose chewable tablet 24 g PRN    heparin (porcine) injection 5,000 Units Q8H    norepinephrine 4 mg in dextrose 5% 250 mL infusion (premix) (titrating) Continuous    ondansetron injection 4 mg Q8H PRN    pantoprazole injection 40 mg Q12H    phenylephrine HCl in 0.9% NaCl 1 mg/10 mL (100 mcg/mL) syringe     piperacillin-tazobactam 4.5 g in sodium chloride 0.9% 100 mL IVPB (ready to mix system) Q8H    potassium chloride CR capsule 50 mEq Once    potassium chloride CR capsule 50 mEq Once    potassium phosphate 30 mmol in dextrose 5 % 500 mL infusion Once    promethazine (PHENERGAN) 6.25 mg in dextrose 5 % 50 mL IVPB Q6H PRN    propofol (DIPRIVAN) 10 mg/mL infusion     pyridoxine (vitamin B6) tablet 50 mg Daily    scopolamine 1.3-1.5 mg (1 mg over 3 days) 1 patch Q3 Days    sodium bicarbonate 1 mEq/mL (8.4 %) solution     sodium chloride 0.9% flush 5 mL PRN    sodium chloride 0.9% flush 5 mL PRN    theophylline 24 hr capsule 300 mg Daily    TPN ADULT CENTRAL LINE CUSTOM Continuous    vancomycin in dextrose 5 % 1 gram/250 mL IVPB 1,000 mg Q12H    vasopressin (PITRESSIN) 0.2 Units/mL in dextrose 5 % 100 mL infusion Continuous     Family History     Problem Relation (Age of Onset)    Cancer Father    Diabetes Brother    Lupus Sister    Multiple sclerosis Mother        Tobacco Use    Smoking status: Never Smoker    Smokeless tobacco: Never Used   Substance and Sexual Activity    Alcohol use: No     Frequency: Never    Drug use: No    Sexual activity: No     Partners: Male     Review of Systems   Unable to perform ROS: Intubated     Objective:     Vital Signs (Most Recent):  Temp: 97.7 °F (36.5 °C) (01/26/19 0745)  Pulse: 85 (01/26/19 1115)  Resp: (!) 25 (01/26/19  1115)  BP: 118/77 (01/26/19 1100)  SpO2: 100 % (01/26/19 1115)  O2 Device (Oxygen Therapy): ventilator (01/26/19 1103) Vital Signs (24h Range):  Temp:  [94.9 °F (34.9 °C)-98.6 °F (37 °C)] 97.7 °F (36.5 °C)  Pulse:  [] 85  Resp:  [14-34] 25  SpO2:  [91 %-100 %] 100 %  BP: ()/(40-77) 118/77  Arterial Line BP: ()/(35-89) 125/67     Weight: 71.2 kg (157 lb) (01/26/19 0900)  Body mass index is 29.66 kg/m².  Body surface area is 1.75 meters squared.    I/O last 3 completed shifts:  In: 58368 [P.O.:480; I.V.:2462; Blood:350; Other:3000; IV Piggyback:6250]  Out: 1125 [Urine:825; Drains:300]    Physical Exam   Constitutional: She appears well-developed. She appears ill. No distress. She is sedated and intubated.   HENT:   Head: Normocephalic and atraumatic.   Right Ear: External ear normal.   Left Ear: External ear normal.   Eyes: Conjunctivae are normal. Right eye exhibits no discharge. Left eye exhibits no discharge.   Neck: Neck supple.   Cardiovascular: Normal rate and regular rhythm. Exam reveals no gallop and no friction rub.   No murmur heard.  Pulmonary/Chest: Breath sounds normal. She is intubated. She has no wheezes. She has no rales.   Abdominal:   LUCIANO drain/wound vac to RLQ   Musculoskeletal: She exhibits edema (upper extremities). She exhibits no deformity.   Skin: Skin is warm. She is not diaphoretic.       Significant Labs:  ABGs:   Recent Labs   Lab 01/26/19  0854   PH 7.165*   PCO2 31.2*   HCO3 11.3*   POCSATURATED 99   BE -17     CBC:   Recent Labs   Lab 01/26/19  0455   WBC 10.43   RBC 3.37*   HGB 9.0*   HCT 28.1*   *   MCV 83   MCH 26.7*   MCHC 32.0     CMP:   Recent Labs   Lab 01/26/19  0455   *   CALCIUM 7.6*   ALBUMIN 1.4*   PROT 3.4*   *   K 3.8   CO2 12*      BUN 2*   CREATININE 0.7   ALKPHOS 245*   ALT 10   AST 29   BILITOT 1.3*

## 2019-01-26 NOTE — SUBJECTIVE & OBJECTIVE
Interval History/Significant Events: R IJ placed for SLED. Clotted off this AM, restarted. Aggressive electrolyte repletion overnight, lactate down trending, ABG improved. SBP/HR stable on Prop gtt.      Follow-up For: Procedure(s) (LRB):  LAPAROTOMY, EXPLORATORY (N/A)  INCISION AND DRAINAGE, ABSCESS-  drainage of pelvic abscess (N/A)  EXCISION, ABSCESS- drainage abdominal wall abscess (N/A)  APPLICATION, WOUND VAC (N/A)    Post-Operative Day: 1 Day Post-Op    Objective:     Vital Signs (Most Recent):  Temp: 97.7 °F (36.5 °C) (01/26/19 0745)  Pulse: 101 (01/26/19 0715)  Resp: 20 (01/26/19 0715)  BP: (!) 105/49 (01/26/19 0415)  SpO2: 100 % (01/26/19 0715) Vital Signs (24h Range):  Temp:  [94.9 °F (34.9 °C)-98.6 °F (37 °C)] 97.7 °F (36.5 °C)  Pulse:  [] 101  Resp:  [14-34] 20  SpO2:  [93 %-100 %] 100 %  BP: ()/(40-88) 105/49  Arterial Line BP: ()/(35-63) 85/49     Weight: 71.4 kg (157 lb 6.5 oz)  Body mass index is 29.74 kg/m².      Intake/Output Summary (Last 24 hours) at 1/26/2019 0805  Last data filed at 1/26/2019 0700  Gross per 24 hour   Intake 43161 ml   Output 1125 ml   Net 66918 ml       Physical Exam   Constitutional: She appears well-developed and well-nourished. No distress.   HENT:   Head: Normocephalic.   Eyes: Pupils are equal, round, and reactive to light.   Cardiovascular: Regular rhythm.   Tachycardic   Pulmonary/Chest: No respiratory distress. She has no wheezes.   Intubated, mechanical breath sounds   Abdominal: Soft. She exhibits no distension.   Neurological:   lethargic   Skin: Skin is warm and dry.       Vents:  Vent Mode: A/C (01/26/19 0710)  Ventilator Initiated: Yes (01/26/19 0239)  Set Rate: 14 bmp (01/26/19 0710)  Vt Set: 350 mL (01/26/19 0710)  PEEP/CPAP: 5 cmH20 (01/26/19 0710)  Oxygen Concentration (%): 40 (01/26/19 0715)  Peak Airway Pressure: 33 cmH2O (01/26/19 0710)  Plateau Pressure: 20 cmH20 (01/26/19 0710)  Total Ve: 5.47 mL (01/26/19 0710)  F/VT Ratio<105 (RSBI):  (!) 51.36 (01/26/19 0710)    Lines/Drains/Airways     Central Venous Catheter Line                 Port A Cath Single Lumen 01/23/19 1400 right subclavian 2 days         Percutaneous Central Line Insertion/Assessment - triple lumen  01/26/19 0010 left internal jugular less than 1 day          Drain                 Closed/Suction Drain 01/25/19 1020 Right;Ventral Abdomen Bulb 19 Fr. less than 1 day         Urethral Catheter 01/25/19 0934 Straight-tip;Non-latex 16 Fr. less than 1 day          Airway                 Airway - Non-Surgical 01/26/19 Endotracheal Tube less than 1 day          Arterial Line                 Arterial Line 01/25/19 1049 Left Radial less than 1 day          Pressure Ulcer                 Negative Pressure Wound Therapy  less than 1 day                Significant Labs:    CBC/Anemia Profile:  Recent Labs   Lab 01/24/19  0832 01/24/19  0833  01/25/19  1551 01/25/19  2112 01/26/19  0015 01/26/19  0455   WBC  --   --    < > 7.81  7.81  --  11.45 10.43   HGB  --   --    < > 8.5*  8.5*  --  7.6* 9.0*   HCT  --   --    < > 26.4*  26.4* 28* 24.7* 28.1*   PLT  --   --    < > 175  175  --  153 134*   MCV  --   --    < > 79*  79*  --  81* 83   RDW  --   --    < > 18.6*  18.6*  --  18.6* 19.4*   IRON 53  --   --   --   --   --   --    FERRITIN 990*  --   --   --   --   --   --    FOLATE  --  2.1*  --   --   --   --   --    [id number]  --  1523*  --   --   --   --   --     < > = values in this interval not displayed.        Chemistries:  Recent Labs   Lab 01/25/19  1551 01/26/19  0015 01/26/19  0455   * 140 134*   K 3.2* 2.9* 3.8    114* 108   CO2 16* 12* 12*   BUN 2* 2* 2*   CREATININE 0.7 0.6 0.7   CALCIUM 7.6* 6.7* 7.6*   ALBUMIN 1.6* 1.3* 1.4*   PROT 3.8* 3.2* 3.4*   BILITOT 1.4* 1.3* 1.3*   ALKPHOS 299* 235* 245*   ALT 11 9* 10   AST 32 27 29   MG 1.7 1.2* 1.2*   PHOS 2.3* 2.1* 2.1*       All pertinent labs within the past 24 hours have been reviewed.    Significant Imaging:  I have  reviewed all pertinent imaging results/findings within the past 24 hours.

## 2019-01-26 NOTE — CONSULTS
[facility]  Cardiology  Consult Note    Patient Name: Sheryl Martines  MRN: [id number]  Admission Date: 1/23/2019  Hospital Length of Stay: 2 days  Code Status: Full Code   Attending Provider: Monster Laurent MD   Consulting Provider: Kin Yanez MD  Primary Care Physician: Primary Doctor No  Principal Problem:Pelvic abscess in female    Patient information was obtained from chart.     Inpatient consult to Cardiology  Consult performed by: Kin Yanez MD  Consult ordered by: Javier Mayorga MD        Subjective:     Chief Complaint:  Mildly elevated troponins, new RBBB     HPI:   62 yo female with CAD, HTN, LV diastolic dysfunction, asthma, COPD, T2DM, CVA 2012 with R sided deficits, severe debility, and lap converted to open appendectomy 8/2018 c/b C diff, FTT, and a round infection presents to the ED for acute on chronic increased generalized abdominal pain with associated NBNB emesis, nausea and decreased appetite.   CT showed:    Posterior right hemipelvis rim enhancing fluid collection, suspicious for abscess.  Possible associated retained appendicolith.    Adjacent urinary bladder wall thickening, suspicious for superimposed cystitis.    She has received vanc/zosyn and underwent exploratory laparotomy with fecalith removal yesterday.    Mg today was 1.2 and Phos 1.7    EKG/troponins were obtained in routine post op fashion and EKG showed an accelerated idioventricular rhythm and troponins were mildly elevated 0.03 -0.08. She has multiple medical problems at this point, including sepsis with severe metabolic acidosis (pH 7.26/22/117, LA 9, and Bicarb 9). Patient is currently on norepinephrine and scheduled duonebs. CXR showed effusion and congestion.    TTE (August 2018):    1 - Concentric remodeling.     2 - No wall motion abnormalities.     3 - Normal left ventricular systolic function (EF 60-65%).     4 - Impaired LV relaxation, normal LAP (grade  1 diastolic dysfunction).     5 - Normal right ventricular systolic function .     6 - The estimated PA systolic pressure is 14 mmHg.     7 - No evidence of intracardiac shunt.     TTE from today is pending.    She is currently intubated and sedated    Past Medical History:   Diagnosis Date    Abnormal LFTs 12/14/2018    Asthma     Closed compression fracture of fourth lumbar vertebra     COPD (chronic obstructive pulmonary disease)     Coronary artery disease     Diabetes mellitus     Fall 10/4/2018    Fracture     right tib & fib    Glaucoma     High cholesterol     Hypertension     Infected incision 9/20/2018    Intractable nausea and vomiting 1/23/2019    Iritis     Pulmonary embolus     S/P appendectomy 9/11/2018    Stroke     rt sided weakness.       Past Surgical History:   Procedure Laterality Date    ABDOMINAL SURGERY      APPENDECTOMY N/A 8/26/2018    Performed by Bernadine Melendrez [facility] at [facility]    APPENDECTOMY, LAPAROSCOPIC---CONVERTED TO OPEN APPENDECTOMY @0950 N/A 8/26/2018    Performed by [facility]adine [facility]rez MD at [facility] OR    BACK SURGERY      stimulator    CATARACT EXTRACTION      HYSTERECTOMY      TOTAL HIP ARTHROPLASTY Left     2008       Review of patient's allergies indicates:   Allergen Reactions    Ace inhibitors Swelling    Carvedilol      Other reaction(s): Other (See Comments)  blister    Hydralazine analogues     Metformin Nausea And Vomiting    Tetracycline Itching    Tetracyclines Swelling    Travatan (with benzalkonium) [travoprost (benzalkonium)]     Travoprost Itching     Other reaction(s): Other (See Comments)  Blurry vision       No current facility-administered medications on file prior to encounter.      Current Outpatient Medications on File Prior to Encounter   Medication Sig    acetaminophen (TYLENOL) 500 MG tablet Take 1,000 mg by mouth 2 (two) times daily as needed for Pain.    albuterol (PROVENTIL/VENTOLIN HFA) 90 mcg/actuation inhaler  Inhale 2 puffs into the lungs every 6 (six) hours as needed for Wheezing or Shortness of Breath. Rescue    albuterol-ipratropium (DUO-NEB) 2.5 mg-0.5 mg/3 mL nebulizer solution Take 3 mLs by nebulization every 4 (four) hours as needed for Wheezing or Shortness of Breath. Rescue     aspirin (ECOTRIN) 81 MG EC tablet Take 1 tablet (81 mg total) by mouth once daily.    baclofen (LIORESAL) 10 MG tablet Take 10 mg by mouth 2 (two) times daily as needed (MUSCLE SPASMS).    cefdinir (OMNICEF) 300 MG capsule TK ONE C PO  BID FOR 7 DAYS    cephALEXin (KEFLEX) 750 MG capsule TK ONE C PO TID FOR 5 DAYS    ciprofloxacin HCl (CIPRO) 250 MG tablet Take 1 tablet (250 mg total) by mouth every 12 (twelve) hours. Starting 12/15 evening    clopidogrel (PLAVIX) 75 mg tablet Take 75 mg by mouth once daily.    diclofenac sodium (VOLTAREN) 1 % Gel Apply 2 g topically daily as needed (PAIN).    diltiaZEM (CARDIZEM CD) 180 MG 24 hr capsule Take 180 mg by mouth once daily.    DULoxetine (CYMBALTA) 60 MG capsule Take 60 mg by mouth once daily.    fluticasone-vilanterol (BREO ELLIPTA) 100-25 mcg/dose diskus inhaler Inhale 1 puff into the lungs once daily. Controller    gabapentin (NEURONTIN) 300 MG capsule Take 300 mg by mouth once daily.    Lactobacillus rhamnosus-fiber 2.5 billion cell-3.5 gram PwPk Take 1 capsule by mouth.    lansoprazole (PREVACID) 30 MG capsule Take 30 mg by mouth once daily.    losartan (COZAAR) 100 MG tablet Take 100 mg by mouth once daily.     ondansetron (ZOFRAN) 4 MG tablet Take 1 tablet (4 mg total) by mouth every 6 (six) hours as needed.    ondansetron (ZOFRAN) 4 MG tablet Take 4-8 mg by mouth.    potassium chloride SA (K-DUR,KLOR-CON) 10 MEQ tablet Take 10 mEq by mouth.    predniSONE (DELTASONE) 10 MG tablet Take 4 tablets (40 mg) on 12/16, then take 1 tablet (10 mg) daily    pyridoxine, vitamin B6, (VITAMIN B-6) 50 MG Tab Take 1 tablet (50 mg total) by mouth once daily.    simvastatin (ZOCOR)  40 MG tablet Take 40 mg by mouth.    theophylline (THEODUR) 300 mg 24 hr capsule Take 300 mg by mouth once daily.    traMADol (ULTRAM) 50 mg tablet TK 1 T PO BID PRN     Family History     Problem Relation (Age of Onset)    Cancer Father    Diabetes Brother    Lupus Sister    Multiple sclerosis Mother        Tobacco Use    Smoking status: Never Smoker    Smokeless tobacco: Never Used   Substance and Sexual Activity    Alcohol use: No     Frequency: Never    Drug use: No    Sexual activity: No     Partners: Male     Review of Systems   Unable to perform ROS: intubated     Objective:     Vital Signs (Most Recent):  Temp: 97.4 °F (36.3 °C) (01/26/19 1100)  Pulse: 86 (01/26/19 1215)  Resp: (!) 26 (01/26/19 1215)  BP: 118/77 (01/26/19 1100)  SpO2: 100 % (01/26/19 1215) Vital Signs (24h Range):  Temp:  [94.9 °F (34.9 °C)-98.6 °F (37 °C)] 97.4 °F (36.3 °C)  Pulse:  [] 86  Resp:  [14-34] 26  SpO2:  [91 %-100 %] 100 %  BP: ()/(40-77) 118/77  Arterial Line BP: ()/(35-89) 102/57     Weight: 71.2 kg (157 lb)  Body mass index is 29.66 kg/m².    SpO2: 100 %  O2 Device (Oxygen Therapy): ventilator      Intake/Output Summary (Last 24 hours) at 1/26/2019 1324  Last data filed at 1/26/2019 1200  Gross per 24 hour   Intake 96041 ml   Output 1100 ml   Net 9412 ml       Lines/Drains/Airways     Central Venous Catheter Line                 Port A Cath Single Lumen 01/23/19 1400 right subclavian 2 days         Percutaneous Central Line Insertion/Assessment - triple lumen  01/26/19 0010 left internal jugular less than 1 day          Drain                 Closed/Suction Drain 01/25/19 1020 Right;Ventral Abdomen Bulb 19 Fr. 1 day         Urethral Catheter 01/25/19 0934 Straight-tip;Non-latex 16 Fr. 1 day          Airway                 Airway - Non-Surgical 01/26/19 Endotracheal Tube less than 1 day          Arterial Line                 Arterial Line 01/25/19 1049 Left Radial 1 day          Pressure Ulcer                  Negative Pressure Wound Therapy  1 day                Physical Exam   Constitutional: She is oriented to person, place, and time. She appears well-developed and well-nourished.   HENT:   Head: Normocephalic and atraumatic.   Nose: Nose normal.   Mouth/Throat: No oropharyngeal exudate.   Eyes: Right eye exhibits no discharge. Left eye exhibits no discharge. No scleral icterus.   Neck: Normal range of motion. Neck supple. No JVD present.   Cardiovascular: Normal rate, regular rhythm, S1 normal and S2 normal. Exam reveals no gallop, no S3, no S4, no distant heart sounds, no friction rub, no midsystolic click and no opening snap.   No murmur heard.  Pulses:       Radial pulses are 2+ on the right side, and 2+ on the left side.        Femoral pulses are 2+ on the right side, and 2+ on the left side.  Pulmonary/Chest: Effort normal. No respiratory distress. She has no wheezes. She has rales. She exhibits no tenderness.   Abdominal: Soft. Bowel sounds are normal. She exhibits distension. There is no tenderness. There is no rebound.   Musculoskeletal: Normal range of motion. She exhibits no edema, tenderness or deformity.   Lymphadenopathy:     She has no cervical adenopathy.   Neurological: She is alert and oriented to person, place, and time. No cranial nerve deficit.   Skin: Skin is warm and dry.   Psychiatric: She has a normal mood and affect. Her behavior is normal.       Significant Labs: All pertinent lab results from the last 24 hours have been reviewed.    Significant Imaging: All pertinent images from the last 24h have been reviewed.    Assessment and Plan:     Elevated troponin    Patient with hx of CAD who is s/p laparotomy for abdominal infection with sepsis and severe lactic acidosis, on pressors and scheduled duonebs. CXR showed pulm congestion and/or effusion.    This is likely type 2 NSTEMI, meaning demand ischemia likely due to all concomitant medical problems. Now on accelerated idioventricular  rhythm.    We recommend the following:  -Restart DAPT when deemed safe by primary team. We do not know when his PCI was as there was no family member in the room and when I called to the emergency contact on chart, no one picked up  -Treat underlying metabolic acidosis (strongly recommend to correct pH) and sepsis to improve cardiac function  -Replace electrolytes to keep K>4, Mg>2, Phos>3. Make sure the replacement is effective  -Wean pressors as tolerated  -F/U TTE       VTE Risk Mitigation (From admission, onward)        Ordered     heparin (porcine) injection 5,000 Units  Every 8 hours      01/26/19 0900     IP VTE HIGH RISK PATIENT  Once      01/25/19 1049     Place sequential compression device  Until discontinued      01/25/19 1049     Place sequential compression device  Until discontinued      01/24/19 0027     Place MARGIE hose  Until discontinued      01/24/19 0027          Kin Gaona MD  Cardiology   [facility]

## 2019-01-26 NOTE — PROGRESS NOTES
Pt taken off bipap and placed on 3L NC per Dr. Mackay request. Bipap on standby at bedside. O2 sats 100%, , RR 18 on 3L NC.

## 2019-01-26 NOTE — PT/OT/SLP PROGRESS
Physical Therapy      Patient Name:  Sheryl Martines   MRN:  [id number]    Patient not seen today secondary to (pt on hold 2nd to being intubated and sedated.). Will follow-up at a later date..    Tita Laws, PT   1/26/2019

## 2019-01-26 NOTE — PROGRESS NOTES
Attempted to call daughter twice to update her on patient's critical status, no response. Will try again in a few hours.    Price Mackay MD  PGY2, Anesthesiology  SICU

## 2019-01-26 NOTE — PLAN OF CARE
Problem: Adult Inpatient Plan of Care  Goal: Plan of Care Review  Outcome: Ongoing (interventions implemented as appropriate)  See assessment and vital signs flow sheets for complete details of shift. Pt presented to SICU AAOx4 borderline hypotensive on phenylephrine, central line placed w/o incident by Dr. Mackay assisted by this nurse. Approximately 0200 pt's condition deteriorated w/ confusion, pronounced hypotension, detasaturation, placed on NRB and emergently intubated. S/p total 11L fluid resuscitation. 1 unit PRBC's, electrolyte replacement, abx administered as ordered. 1 amp bicarb and 1 amp D50 administered. Precedex gtt initiated for agitation. Bilateral wrist restraints in place. Ritchie catheter in place, UO 30-50ml/hr. Levophed titrated for MAP>60. Daughter present at bedside this AM, verbalizes understanding of plan of care. Pt remains in guarded condition, will continue to monitor.

## 2019-01-27 LAB
ALBUMIN SERPL BCP-MCNC: 1 G/DL
ALBUMIN SERPL BCP-MCNC: 1.1 G/DL
ALBUMIN SERPL BCP-MCNC: 1.1 G/DL
ALBUMIN SERPL BCP-MCNC: 1.2 G/DL
ALLENS TEST: ABNORMAL
ALP SERPL-CCNC: 206 U/L
ALP SERPL-CCNC: 209 U/L
ALP SERPL-CCNC: 232 U/L
ALT SERPL W/O P-5'-P-CCNC: 10 U/L
ALT SERPL W/O P-5'-P-CCNC: 8 U/L
ALT SERPL W/O P-5'-P-CCNC: 9 U/L
ANION GAP SERPL CALC-SCNC: 8 MMOL/L
ANION GAP SERPL CALC-SCNC: 8 MMOL/L
ANION GAP SERPL CALC-SCNC: 9 MMOL/L
AST SERPL-CCNC: 14 U/L
AST SERPL-CCNC: 17 U/L
AST SERPL-CCNC: 24 U/L
BACTERIA BLD CULT: NORMAL
BASOPHILS # BLD AUTO: 0.02 K/UL
BASOPHILS NFR BLD: 0.3 %
BILIRUB SERPL-MCNC: 1.4 MG/DL
BILIRUB SERPL-MCNC: 1.6 MG/DL
BILIRUB SERPL-MCNC: 1.7 MG/DL
BUN SERPL-MCNC: 2 MG/DL
BUN SERPL-MCNC: 4 MG/DL
BUN SERPL-MCNC: 5 MG/DL
BUN SERPL-MCNC: <2 MG/DL
BUN SERPL-MCNC: <2 MG/DL
CA-I BLDV-SCNC: 0.94 MMOL/L
CA-I BLDV-SCNC: 0.95 MMOL/L
CA-I BLDV-SCNC: 0.98 MMOL/L
CA-I BLDV-SCNC: 1.04 MMOL/L
CALCIUM SERPL-MCNC: 6.6 MG/DL
CALCIUM SERPL-MCNC: 6.6 MG/DL
CALCIUM SERPL-MCNC: 6.7 MG/DL
CALCIUM SERPL-MCNC: 6.8 MG/DL
CALCIUM SERPL-MCNC: 7.1 MG/DL
CHLORIDE SERPL-SCNC: 104 MMOL/L
CHLORIDE SERPL-SCNC: 105 MMOL/L
CHLORIDE SERPL-SCNC: 105 MMOL/L
CO2 SERPL-SCNC: 21 MMOL/L
CO2 SERPL-SCNC: 21 MMOL/L
CO2 SERPL-SCNC: 22 MMOL/L
CO2 SERPL-SCNC: 22 MMOL/L
CO2 SERPL-SCNC: 24 MMOL/L
CREAT SERPL-MCNC: 0.5 MG/DL
CREAT SERPL-MCNC: 0.6 MG/DL
CREAT SERPL-MCNC: 0.6 MG/DL
DELSYS: ABNORMAL
DIFFERENTIAL METHOD: ABNORMAL
EOSINOPHIL # BLD AUTO: 0 K/UL
EOSINOPHIL NFR BLD: 0 %
ERYTHROCYTE [DISTWIDTH] IN BLOOD BY AUTOMATED COUNT: 17.2 %
ERYTHROCYTE [SEDIMENTATION RATE] IN BLOOD BY WESTERGREN METHOD: 15 MM/H
ERYTHROCYTE [SEDIMENTATION RATE] IN BLOOD BY WESTERGREN METHOD: 15 MM/H
ERYTHROCYTE [SEDIMENTATION RATE] IN BLOOD BY WESTERGREN METHOD: 18 MM/H
ERYTHROCYTE [SEDIMENTATION RATE] IN BLOOD BY WESTERGREN METHOD: 18 MM/H
ERYTHROCYTE [SEDIMENTATION RATE] IN BLOOD BY WESTERGREN METHOD: 20 MM/H
EST. GFR  (AFRICAN AMERICAN): >60 ML/MIN/1.73 M^2
EST. GFR  (NON AFRICAN AMERICAN): >60 ML/MIN/1.73 M^2
FIO2: 30
FIO2: 40
GLUCOSE SERPL-MCNC: 267 MG/DL
GLUCOSE SERPL-MCNC: 298 MG/DL
GLUCOSE SERPL-MCNC: 371 MG/DL
GLUCOSE SERPL-MCNC: 551 MG/DL
GLUCOSE SERPL-MCNC: 557 MG/DL
HCO3 UR-SCNC: 22.7 MMOL/L (ref 24–28)
HCO3 UR-SCNC: 24.2 MMOL/L (ref 24–28)
HCO3 UR-SCNC: 24.9 MMOL/L (ref 24–28)
HCO3 UR-SCNC: 26 MMOL/L (ref 24–28)
HCO3 UR-SCNC: 27.6 MMOL/L (ref 24–28)
HCT VFR BLD AUTO: 26.5 %
HGB BLD-MCNC: 9.3 G/DL
IMM GRANULOCYTES # BLD AUTO: 0.12 K/UL
IMM GRANULOCYTES NFR BLD AUTO: 1.6 %
LACTATE SERPL-SCNC: 3.3 MMOL/L
LACTATE SERPL-SCNC: 3.6 MMOL/L
LACTATE SERPL-SCNC: 3.9 MMOL/L
LACTATE SERPL-SCNC: 4.2 MMOL/L
LACTATE SERPL-SCNC: 6 MMOL/L
LACTATE SERPL-SCNC: 6.2 MMOL/L
LYMPHOCYTES # BLD AUTO: 0.3 K/UL
LYMPHOCYTES NFR BLD: 4.2 %
MAGNESIUM SERPL-MCNC: 1.4 MG/DL
MAGNESIUM SERPL-MCNC: 1.8 MG/DL
MAGNESIUM SERPL-MCNC: 1.9 MG/DL
MAGNESIUM SERPL-MCNC: 2.3 MG/DL
MCH RBC QN AUTO: 27.1 PG
MCHC RBC AUTO-ENTMCNC: 35.1 G/DL
MCV RBC AUTO: 77 FL
METHYLMALONATE SERPL-SCNC: 0.12 UMOL/L
MIN VOL: 5.5
MIN VOL: 7
MIN VOL: 7
MIN VOL: 9
MODE: ABNORMAL
MONOCYTES # BLD AUTO: 0.2 K/UL
MONOCYTES NFR BLD: 3 %
NEUTROPHILS # BLD AUTO: 6.9 K/UL
NEUTROPHILS NFR BLD: 90.9 %
NRBC BLD-RTO: 0 /100 WBC
PCO2 BLDA: 25.9 MMHG (ref 35–45)
PCO2 BLDA: 28.8 MMHG (ref 35–45)
PCO2 BLDA: 30.5 MMHG (ref 35–45)
PCO2 BLDA: 35.1 MMHG (ref 35–45)
PCO2 BLDA: 36.4 MMHG (ref 35–45)
PEEP: 5
PH SMN: 7.48 [PH] (ref 7.35–7.45)
PH SMN: 7.49 [PH] (ref 7.35–7.45)
PH SMN: 7.52 [PH] (ref 7.35–7.45)
PH SMN: 7.53 [PH] (ref 7.35–7.45)
PH SMN: 7.55 [PH] (ref 7.35–7.45)
PHOSPHATE SERPL-MCNC: 1.4 MG/DL
PHOSPHATE SERPL-MCNC: 2 MG/DL
PHOSPHATE SERPL-MCNC: 2 MG/DL
PHOSPHATE SERPL-MCNC: 2.3 MG/DL
PIP: 19
PIP: 22
PLATELET # BLD AUTO: 59 K/UL
PMV BLD AUTO: ABNORMAL FL
PO2 BLDA: 102 MMHG (ref 80–100)
PO2 BLDA: 71 MMHG (ref 80–100)
PO2 BLDA: 87 MMHG (ref 80–100)
PO2 BLDA: 93 MMHG (ref 80–100)
PO2 BLDA: 99 MMHG (ref 80–100)
POC BE: 0 MMOL/L
POC BE: 2 MMOL/L
POC BE: 4 MMOL/L
POC SATURATED O2: 96 % (ref 95–100)
POC SATURATED O2: 97 % (ref 95–100)
POC SATURATED O2: 98 % (ref 95–100)
POC SATURATED O2: 98 % (ref 95–100)
POC SATURATED O2: 99 % (ref 95–100)
POC TCO2: 23 MMOL/L (ref 23–27)
POC TCO2: 25 MMOL/L (ref 23–27)
POC TCO2: 26 MMOL/L (ref 23–27)
POC TCO2: 27 MMOL/L (ref 23–27)
POC TCO2: 29 MMOL/L (ref 23–27)
POCT GLUCOSE: 247 MG/DL (ref 70–110)
POCT GLUCOSE: 298 MG/DL (ref 70–110)
POCT GLUCOSE: 427 MG/DL (ref 70–110)
POCT GLUCOSE: 429 MG/DL (ref 70–110)
POCT GLUCOSE: 435 MG/DL (ref 70–110)
POCT GLUCOSE: 453 MG/DL (ref 70–110)
POCT GLUCOSE: 460 MG/DL (ref 70–110)
POCT GLUCOSE: 482 MG/DL (ref 70–110)
POCT GLUCOSE: 490 MG/DL (ref 70–110)
POCT GLUCOSE: 496 MG/DL (ref 70–110)
POTASSIUM SERPL-SCNC: 3.6 MMOL/L
POTASSIUM SERPL-SCNC: 4.1 MMOL/L
POTASSIUM SERPL-SCNC: 4.3 MMOL/L
PROT SERPL-MCNC: 2.8 G/DL
PROT SERPL-MCNC: 3 G/DL
PROT SERPL-MCNC: 3.1 G/DL
RBC # BLD AUTO: 3.43 M/UL
SAMPLE: ABNORMAL
SITE: ABNORMAL
SODIUM SERPL-SCNC: 134 MMOL/L
SODIUM SERPL-SCNC: 134 MMOL/L
SODIUM SERPL-SCNC: 135 MMOL/L
SODIUM SERPL-SCNC: 136 MMOL/L
SODIUM SERPL-SCNC: 136 MMOL/L
SP02: 100
VANCOMYCIN TROUGH SERPL-MCNC: 18.1 UG/ML
VT: 350
WBC # BLD AUTO: 7.62 K/UL

## 2019-01-27 PROCEDURE — 94761 N-INVAS EAR/PLS OXIMETRY MLT: CPT

## 2019-01-27 PROCEDURE — 84100 ASSAY OF PHOSPHORUS: CPT | Mod: 91

## 2019-01-27 PROCEDURE — 90945 DIALYSIS ONE EVALUATION: CPT

## 2019-01-27 PROCEDURE — 83605 ASSAY OF LACTIC ACID: CPT | Mod: 91

## 2019-01-27 PROCEDURE — 80048 BASIC METABOLIC PNL TOTAL CA: CPT

## 2019-01-27 PROCEDURE — B4185 PARENTERAL SOL 10 GM LIPIDS: HCPCS | Performed by: STUDENT IN AN ORGANIZED HEALTH CARE EDUCATION/TRAINING PROGRAM

## 2019-01-27 PROCEDURE — 25000003 PHARM REV CODE 250: Performed by: SURGERY

## 2019-01-27 PROCEDURE — 63600175 PHARM REV CODE 636 W HCPCS: Performed by: STUDENT IN AN ORGANIZED HEALTH CARE EDUCATION/TRAINING PROGRAM

## 2019-01-27 PROCEDURE — 25000003 PHARM REV CODE 250: Performed by: PHYSICIAN ASSISTANT

## 2019-01-27 PROCEDURE — 82330 ASSAY OF CALCIUM: CPT | Mod: 91

## 2019-01-27 PROCEDURE — 63600175 PHARM REV CODE 636 W HCPCS: Mod: JG | Performed by: STUDENT IN AN ORGANIZED HEALTH CARE EDUCATION/TRAINING PROGRAM

## 2019-01-27 PROCEDURE — 99223 PR INITIAL HOSPITAL CARE,LEVL III: ICD-10-PCS | Mod: GC,,, | Performed by: INTERNAL MEDICINE

## 2019-01-27 PROCEDURE — 25000242 PHARM REV CODE 250 ALT 637 W/ HCPCS: Performed by: SURGERY

## 2019-01-27 PROCEDURE — 84100 ASSAY OF PHOSPHORUS: CPT

## 2019-01-27 PROCEDURE — 63600175 PHARM REV CODE 636 W HCPCS: Performed by: PHYSICIAN ASSISTANT

## 2019-01-27 PROCEDURE — 25000003 PHARM REV CODE 250: Performed by: STUDENT IN AN ORGANIZED HEALTH CARE EDUCATION/TRAINING PROGRAM

## 2019-01-27 PROCEDURE — 83735 ASSAY OF MAGNESIUM: CPT

## 2019-01-27 PROCEDURE — 80069 RENAL FUNCTION PANEL: CPT

## 2019-01-27 PROCEDURE — 82803 BLOOD GASES ANY COMBINATION: CPT

## 2019-01-27 PROCEDURE — 37799 UNLISTED PX VASCULAR SURGERY: CPT

## 2019-01-27 PROCEDURE — 83735 ASSAY OF MAGNESIUM: CPT | Mod: 91

## 2019-01-27 PROCEDURE — 20000000 HC ICU ROOM

## 2019-01-27 PROCEDURE — P9045 ALBUMIN (HUMAN), 5%, 250 ML: HCPCS | Mod: JG | Performed by: STUDENT IN AN ORGANIZED HEALTH CARE EDUCATION/TRAINING PROGRAM

## 2019-01-27 PROCEDURE — 80100008 HC CRRT DAILY MAINTENANCE

## 2019-01-27 PROCEDURE — 83605 ASSAY OF LACTIC ACID: CPT

## 2019-01-27 PROCEDURE — 80053 COMPREHEN METABOLIC PANEL: CPT | Mod: 91

## 2019-01-27 PROCEDURE — 63600175 PHARM REV CODE 636 W HCPCS: Performed by: SURGERY

## 2019-01-27 PROCEDURE — 82330 ASSAY OF CALCIUM: CPT

## 2019-01-27 PROCEDURE — 99291 CRITICAL CARE FIRST HOUR: CPT | Mod: ,,, | Performed by: SURGERY

## 2019-01-27 PROCEDURE — 80202 ASSAY OF VANCOMYCIN: CPT

## 2019-01-27 PROCEDURE — 85025 COMPLETE CBC W/AUTO DIFF WBC: CPT

## 2019-01-27 PROCEDURE — 94640 AIRWAY INHALATION TREATMENT: CPT

## 2019-01-27 PROCEDURE — 99900035 HC TECH TIME PER 15 MIN (STAT)

## 2019-01-27 PROCEDURE — 80053 COMPREHEN METABOLIC PANEL: CPT

## 2019-01-27 PROCEDURE — 27000221 HC OXYGEN, UP TO 24 HOURS

## 2019-01-27 PROCEDURE — 94668 MNPJ CHEST WALL SBSQ: CPT

## 2019-01-27 PROCEDURE — 99223 1ST HOSP IP/OBS HIGH 75: CPT | Mod: GC,,, | Performed by: INTERNAL MEDICINE

## 2019-01-27 PROCEDURE — 94003 VENT MGMT INPAT SUBQ DAY: CPT

## 2019-01-27 PROCEDURE — 99291 PR CRITICAL CARE, E/M 30-74 MINUTES: ICD-10-PCS | Mod: ,,, | Performed by: SURGERY

## 2019-01-27 PROCEDURE — C9113 INJ PANTOPRAZOLE SODIUM, VIA: HCPCS | Performed by: PHYSICIAN ASSISTANT

## 2019-01-27 PROCEDURE — A4217 STERILE WATER/SALINE, 500 ML: HCPCS | Performed by: STUDENT IN AN ORGANIZED HEALTH CARE EDUCATION/TRAINING PROGRAM

## 2019-01-27 RX ORDER — FENTANYL CITRATE-0.9 % NACL/PF 10 MCG/ML
PLASTIC BAG, INJECTION (ML) INTRAVENOUS CONTINUOUS
Status: DISCONTINUED | OUTPATIENT
Start: 2019-01-27 | End: 2019-01-27

## 2019-01-27 RX ORDER — POTASSIUM CHLORIDE 29.8 MG/ML
40 INJECTION INTRAVENOUS ONCE
Status: COMPLETED | OUTPATIENT
Start: 2019-01-27 | End: 2019-01-28

## 2019-01-27 RX ORDER — ALBUMIN HUMAN 50 G/1000ML
25 SOLUTION INTRAVENOUS ONCE
Status: COMPLETED | OUTPATIENT
Start: 2019-01-27 | End: 2019-01-27

## 2019-01-27 RX ORDER — FENTANYL CITRATE-0.9 % NACL/PF 10 MCG/ML
PLASTIC BAG, INJECTION (ML) INTRAVENOUS CONTINUOUS
Status: DISCONTINUED | OUTPATIENT
Start: 2019-01-27 | End: 2019-01-28

## 2019-01-27 RX ORDER — NAPROXEN SODIUM 220 MG/1
81 TABLET, FILM COATED ORAL DAILY
Status: DISCONTINUED | OUTPATIENT
Start: 2019-01-27 | End: 2019-01-29

## 2019-01-27 RX ORDER — INSULIN ASPART 100 [IU]/ML
1-10 INJECTION, SOLUTION INTRAVENOUS; SUBCUTANEOUS EVERY 6 HOURS PRN
Status: DISCONTINUED | OUTPATIENT
Start: 2019-01-27 | End: 2019-01-27

## 2019-01-27 RX ADMIN — VANCOMYCIN HYDROCHLORIDE 1000 MG: 1 INJECTION, POWDER, FOR SOLUTION INTRAVENOUS at 12:01

## 2019-01-27 RX ADMIN — PIPERACILLIN AND TAZOBACTAM 4.5 G: 4; .5 INJECTION, POWDER, LYOPHILIZED, FOR SOLUTION INTRAVENOUS; PARENTERAL at 03:01

## 2019-01-27 RX ADMIN — PANTOPRAZOLE SODIUM 40 MG: 40 INJECTION, POWDER, LYOPHILIZED, FOR SOLUTION INTRAVENOUS at 08:01

## 2019-01-27 RX ADMIN — ALBUMIN HUMAN 25 G: 0.05 INJECTION, SOLUTION INTRAVENOUS at 04:01

## 2019-01-27 RX ADMIN — Medication 75 MG: at 03:01

## 2019-01-27 RX ADMIN — HYDROCORTISONE SODIUM SUCCINATE 100 MG: 100 INJECTION, POWDER, FOR SOLUTION INTRAMUSCULAR; INTRAVENOUS at 05:01

## 2019-01-27 RX ADMIN — SODIUM GLYCOLATE 30 MMOL: 216 INJECTION, SOLUTION INTRAVENOUS at 02:01

## 2019-01-27 RX ADMIN — INSULIN ASPART 6 UNITS: 100 INJECTION, SOLUTION INTRAVENOUS; SUBCUTANEOUS at 05:01

## 2019-01-27 RX ADMIN — VASOPRESSIN 0.04 UNITS/MIN: 20 INJECTION INTRAVENOUS at 12:01

## 2019-01-27 RX ADMIN — SODIUM GLYCOLATE 30 MMOL: 216 INJECTION, SOLUTION INTRAVENOUS at 05:01

## 2019-01-27 RX ADMIN — HYDROCORTISONE SODIUM SUCCINATE 100 MG: 100 INJECTION, POWDER, FOR SOLUTION INTRAMUSCULAR; INTRAVENOUS at 02:01

## 2019-01-27 RX ADMIN — IPRATROPIUM BROMIDE AND ALBUTEROL SULFATE 3 ML: .5; 3 SOLUTION RESPIRATORY (INHALATION) at 07:01

## 2019-01-27 RX ADMIN — SODIUM CHLORIDE, SODIUM LACTATE, POTASSIUM CHLORIDE, AND CALCIUM CHLORIDE 1000 ML: .6; .31; .03; .02 INJECTION, SOLUTION INTRAVENOUS at 09:01

## 2019-01-27 RX ADMIN — PIPERACILLIN AND TAZOBACTAM 4.5 G: 4; .5 INJECTION, POWDER, LYOPHILIZED, FOR SOLUTION INTRAVENOUS; PARENTERAL at 08:01

## 2019-01-27 RX ADMIN — POTASSIUM CHLORIDE 40 MEQ: 29.8 INJECTION, SOLUTION INTRAVENOUS at 11:01

## 2019-01-27 RX ADMIN — MAGNESIUM SULFATE HEPTAHYDRATE 1 G: 500 INJECTION, SOLUTION INTRAMUSCULAR; INTRAVENOUS at 12:01

## 2019-01-27 RX ADMIN — HEPARIN SODIUM 5000 UNITS: 5000 INJECTION, SOLUTION INTRAVENOUS; SUBCUTANEOUS at 02:01

## 2019-01-27 RX ADMIN — PROPOFOL 15 MCG/KG/MIN: 10 INJECTION, EMULSION INTRAVENOUS at 12:01

## 2019-01-27 RX ADMIN — ALBUTEROL SULFATE 2.5 MG: 2.5 SOLUTION RESPIRATORY (INHALATION) at 01:01

## 2019-01-27 RX ADMIN — CALCIUM CHLORIDE 1 G: 100 INJECTION, SOLUTION INTRAVENOUS at 11:01

## 2019-01-27 RX ADMIN — CALCIUM GLUCONATE: 94 INJECTION, SOLUTION INTRAVENOUS at 09:01

## 2019-01-27 RX ADMIN — PROPOFOL 10 MCG/KG/MIN: 10 INJECTION, EMULSION INTRAVENOUS at 11:01

## 2019-01-27 RX ADMIN — SODIUM CHLORIDE 4 UNITS/HR: 9 INJECTION, SOLUTION INTRAVENOUS at 02:01

## 2019-01-27 RX ADMIN — HYDROCORTISONE SODIUM SUCCINATE 100 MG: 100 INJECTION, POWDER, FOR SOLUTION INTRAMUSCULAR; INTRAVENOUS at 09:01

## 2019-01-27 RX ADMIN — CALCIUM GLUCONATE 1000 MG: 98 INJECTION, SOLUTION INTRAVENOUS at 02:01

## 2019-01-27 RX ADMIN — Medication 50 MG: at 09:01

## 2019-01-27 RX ADMIN — VANCOMYCIN HYDROCHLORIDE 1000 MG: 1 INJECTION, POWDER, FOR SOLUTION INTRAVENOUS at 11:01

## 2019-01-27 RX ADMIN — CALCIUM GLUCONATE 1000 MG: 98 INJECTION, SOLUTION INTRAVENOUS at 10:01

## 2019-01-27 RX ADMIN — IPRATROPIUM BROMIDE AND ALBUTEROL SULFATE 3 ML: .5; 3 SOLUTION RESPIRATORY (INHALATION) at 12:01

## 2019-01-27 RX ADMIN — PANTOPRAZOLE SODIUM 40 MG: 40 INJECTION, POWDER, LYOPHILIZED, FOR SOLUTION INTRAVENOUS at 09:01

## 2019-01-27 RX ADMIN — HEPARIN SODIUM 5000 UNITS: 5000 INJECTION, SOLUTION INTRAVENOUS; SUBCUTANEOUS at 09:01

## 2019-01-27 RX ADMIN — VASOPRESSIN 0.04 UNITS/MIN: 20 INJECTION INTRAVENOUS at 09:01

## 2019-01-27 RX ADMIN — SOYBEAN OIL 250 ML: 20 INJECTION, SOLUTION INTRAVENOUS at 09:01

## 2019-01-27 RX ADMIN — HEPARIN SODIUM 5000 UNITS: 5000 INJECTION, SOLUTION INTRAVENOUS; SUBCUTANEOUS at 05:01

## 2019-01-27 RX ADMIN — SODIUM CHLORIDE 42 UNITS/HR: 9 INJECTION, SOLUTION INTRAVENOUS at 11:01

## 2019-01-27 RX ADMIN — PIPERACILLIN AND TAZOBACTAM 4.5 G: 4; .5 INJECTION, POWDER, LYOPHILIZED, FOR SOLUTION INTRAVENOUS; PARENTERAL at 12:01

## 2019-01-27 RX ADMIN — Medication: at 04:01

## 2019-01-27 RX ADMIN — SODIUM CHLORIDE 42 UNITS/HR: 9 INJECTION, SOLUTION INTRAVENOUS at 08:01

## 2019-01-27 RX ADMIN — VASOPRESSIN 0.04 UNITS/MIN: 20 INJECTION INTRAVENOUS at 05:01

## 2019-01-27 RX ADMIN — INSULIN ASPART 1 UNITS: 100 INJECTION, SOLUTION INTRAVENOUS; SUBCUTANEOUS at 12:01

## 2019-01-27 RX ADMIN — CALCIUM GLUCONATE 1000 MG: 98 INJECTION, SOLUTION INTRAVENOUS at 05:01

## 2019-01-27 NOTE — ASSESSMENT & PLAN NOTE
NIKHIL on normal renal function    62 yo AAM who presented with worsening nausea/vomiting for several months.  Imaging revealed possible abscess to past appendectomy site and she underwent exp/lap with abscess drainage on 1/25.  Post-op course complicated by hypotension and lactic acidosis.  She was volume resuscitated with 5L of crystalloids.  On the morning of 1/26, UOP has slowly trended down and now with anuria.     NIKHIL 2/2 Ischemic ATN vs. Septic ATN        Plan/recommendations:  -will discontinue SLED at this time  -continue strict I/O's  -lactate remains elevated, with metabolic acidosis resolved with SLED now with alkalemia on ABG  -CVP of 5 this morning and vent settings stable.  -recommend lasix 80 mg IV x 1 for increased FiO2 requirements  -will repeat renal panel this afternoon to determine needs for further RRT.

## 2019-01-27 NOTE — ASSESSMENT & PLAN NOTE
63 y.o. female with hx of asthma, COPD, CAD, DM, HTN, CVA  with R side deficits, HFpEF, severe debility, lap converted to open appendectomy 2018 complicated by cdiff, failure to thrive.  Hx of difficult intubation and delayed emergence, CO2 narcosis needing ICU stay.  Hx of PRN O2 at night. Patient now s/p exploratory laparotomy, washout, and pelvic abscess drainage on 19.    Post-operatively, on initial evaluation in PACU, patient hypotensive to 60s/40s, on BiPap. Currently fluid resuscitating with 3rd liter of LR, responding well to phenylephrine, starting phenylephrine infusion (tachycardic to ~118). ABG 7.11/49/116/16, BE -14, on bipap 14/5, 35% FiO2.     Neuro:  Sedation: Prop gtt @ 15  Pain: Fentanyl gtt @ 50     CV:  - decreasing pressor requirements - levo .02, vaso .04   - wean pressors as able  - Lactate 6.0, downtrending    Pulm:   - Intubated  - Latest AB.55/71/25.9/22.7 - decreased rate, increased FiO2, repeat ABG pending  - serial ABGs  - daily CXR  Vent Mode: A/C  Oxygen Concentration (%):  [29-40] 40  Resp Rate Total:  [14 br/min-29 br/min] 18 br/min  Vt Set:  [350 mL] 350 mL  PEEP/CPAP:  [5 cmH20] 5 cmH20  Mean Airway Pressure:  [8.6 cmH20-10 cmH20] 8.7 cmH20      Renal:  Trend BUN/Cr: <2/0.5  Monitor Urine output - 440cc urine, 2.2 SLED  Nephro following, appreciate recs    ID:  AF   blood cultures: gram positive cocci in clusters resembling staph   operative cultures pending, ngtd thus far  - on vanc/zosyn  - WBC 7.6    FEN/GI:  TPN @ 75  Replace lytes PRN, aggressive repletion overnight, q8 labs  ppi  Lactate 6.0, continue to trend, q4h labs  20 cc drain, 50 WV, 120 OG      Endo:  Patient with IDDM2, on detemir 20u BID at home  Med correction SSI, lastest   Monitor closely, BG q4    Heme:  Trend H&H : 9.3  Post-op hgb 8.5    Dispo: Cont ICU care   Primary: SICU/general surgery

## 2019-01-27 NOTE — ASSESSMENT & PLAN NOTE
62 yo female presenting with abdominal pain, nausea, elevated lactate s/p open drainage pelvic abscess 1/25 now with severe sepsis, lactic acidosis    -cont broad spec abx, f/u cultures- NGTD besides blood cx from admit staph x2  -nephrology consulted for SLED given severe acidosis which has improved

## 2019-01-27 NOTE — SUBJECTIVE & OBJECTIVE
Interval History: Acidosis resolved on CRRT, lactate now 6. UOP 20-50 ml/h o/n. Minimal vent settings.     Medications:  Continuous Infusions:   dexmedetomidine (PRECEDEX) infusion Stopped (01/26/19 1300)    fentanyl 5 mL/hr at 01/27/19 0800    norepinephrine bitartrate-D5W 0.06 mcg/kg/min (01/27/19 0800)    propofol 15 mcg/kg/min (01/27/19 0800)    TPN ADULT CENTRAL LINE CUSTOM 75 mL/hr at 01/27/19 0800    TPN ADULT CENTRAL LINE CUSTOM      vasopressin (PITRESSIN) infusion 0.04 Units/min (01/27/19 0902)     Scheduled Meds:   albuterol-ipratropium  3 mL Nebulization Q6H    fat emulsion 20%  250 mL Intravenous Daily    fluticasone-vilanterol  1 puff Inhalation Daily    heparin (porcine)  5,000 Units Subcutaneous Q8H    hydrocortisone sodium succinate  100 mg Intravenous Q8H    pantoprozole (PROTONIX) IV  40 mg Intravenous Q12H    piperacillin-tazobactam (ZOSYN) IVPB  4.5 g Intravenous Q8H    potassium chloride  50 mEq Oral Once    potassium chloride  50 mEq Oral Once    potassium phosphate IVPB  30 mmol Intravenous Once    pyridoxine (vitamin B6)  50 mg Oral Daily    scopolamine  1 patch Transdermal Q3 Days    theophylline  300 mg Oral Daily    vancomycin (VANCOCIN) IVPB  1,000 mg Intravenous Q12H     PRN Meds:sodium chloride, albuterol sulfate, dextrose 50 % in water (D50W), dextrose 50 % in water (D50W), diclofenac sodium, glucagon (human recombinant), glucose, glucose, insulin aspart U-100, ondansetron, promethazine (PHENERGAN) IVPB, sodium chloride 0.9%, sodium chloride 0.9%     Review of patient's allergies indicates:   Allergen Reactions    Ace inhibitors Swelling    Carvedilol      Other reaction(s): Other (See Comments)  blister    Hydralazine analogues     Metformin Nausea And Vomiting    Tetracycline Itching    Tetracyclines Swelling    Travatan (with benzalkonium) [travoprost (benzalkonium)]     Travoprost Itching     Other reaction(s): Other (See Comments)  Blurry vision      Objective:     Vital Signs (Most Recent):  Temp: 98.3 °F (36.8 °C) (01/27/19 0700)  Pulse: 63 (01/27/19 0800)  Resp: 18 (01/27/19 0800)  BP: (!) 148/67 (01/27/19 0700)  SpO2: 99 % (01/27/19 0800) Vital Signs (24h Range):  Temp:  [97.4 °F (36.3 °C)-98.3 °F (36.8 °C)] 98.3 °F (36.8 °C)  Pulse:  [63-90] 63  Resp:  [11-29] 18  SpO2:  [91 %-100 %] 99 %  BP: (116-157)/(57-85) 148/67  Arterial Line BP: ()/(44-89) 165/75     Weight: 71.2 kg (157 lb)  Body mass index is 29.66 kg/m².    Intake/Output - Last 3 Shifts       01/25 0700 - 01/26 0659 01/26 0700 - 01/27 0659 01/27 0700 - 01/28 0659    P.O.       I.V. (mL/kg) 2462 (34.5) 8192.4 (115.1)     Blood 350 350     Other 3000      IV Piggyback 6250 844.5     TPN  670     Total Intake(mL/kg) 89475 (168.9) 07328.9 (141.2)     Urine (mL/kg/hr) 785 (0.5) 508 (0.3) 47 (0.3)    Drains 300 140     Other  2423 297    Stool 0      Total Output 1085 3071 344    Net +38817 +6985.9 -344           Stool Occurrence 0 x            Physical Exam   Constitutional: No distress.   HENT:   Head: Normocephalic and atraumatic.   Pulmonary/Chest:   Mechanically ventilated 40%/5 of PEEP   Abdominal:   Abdomen soft, midline incision with prevena vac, pelvic drain purulent  RLQ former incision abscess site clean   Neurological:   Arouses with stimulation   Skin: Skin is warm and dry. She is not diaphoretic.       Significant Labs:  CBC:   Recent Labs   Lab 01/27/19  0330   WBC 7.62   RBC 3.43*   HGB 9.3*   HCT 26.5*   PLT 59*   MCV 77*   MCH 27.1   MCHC 35.1     BMP:   Recent Labs   Lab 01/27/19  0330   *   *   K 4.3      CO2 21*   BUN <2*   CREATININE 0.5   CALCIUM 6.7*   MG 1.8     ABGs:   Recent Labs   Lab 01/27/19  0801   PH 7.520*   PCO2 30.5*   PO2 93   HCO3 24.9   POCSATURATED 98   BE 2     Lactate 6

## 2019-01-27 NOTE — PROGRESS NOTES
"[facility]  General Surgery  Progress Note    Subjective:     History of Present Illness:  64 yo W with a history of HTN, COPD on home oxygen, HFpEF, IDDMII, CVA on plavix, HTN, debility after fall and broken hip, and PE who presents to the ED with a several month history of nausea, vomiting, inability to tolerate a diet and weight loss. She has had multiple admissions in the past few months for different ailments. She presents today with continued nausea, vomiting and abdominal pain that is mostly worse in the RLQ. During the examination, she states her pain was all over her abdomen because she was retching so much. She states that she has lost close to 40lbs since her surgery and that she only able to keep broth down. She had a lap converted to open appendectomy back in August 2018 that was complicated by a wound infection, C diff and failure to thrive. This seems to persists. She is now wheelchair bound (per her) and apparently lives in [place] but is here in [place] with her daughter.    She had a CT scan in the ED which revealed an irregular shaped rim enhancing fluid collection measuring at least 4.0 x 3.0 cm ight hemipelvis at the site of prior appendectomy. Surgery was consulted for further recommendations. She was also noted to have a "knot" at the RLQ incision site that is also tender.    Post-Op Info:  Procedure(s) (LRB):  LAPAROTOMY, EXPLORATORY (N/A)  INCISION AND DRAINAGE, ABSCESS-  drainage of pelvic abscess (N/A)  EXCISION, ABSCESS- drainage abdominal wall abscess (N/A)  APPLICATION, WOUND VAC (N/A)   2 Days Post-Op     Interval History: Acidosis resolved on CRRT, lactate now 6. UOP 20-50 ml/h o/n. Minimal vent settings.     Medications:  Continuous Infusions:   dexmedetomidine (PRECEDEX) infusion Stopped (01/26/19 1300)    fentanyl 5 mL/hr at 01/27/19 0800    norepinephrine bitartrate-D5W 0.06 mcg/kg/min (01/27/19 0800)    propofol 15 mcg/kg/min (01/27/19 0800)    TPN " ADULT CENTRAL LINE CUSTOM 75 mL/hr at 01/27/19 0800    TPN ADULT CENTRAL LINE CUSTOM      vasopressin (PITRESSIN) infusion 0.04 Units/min (01/27/19 0902)     Scheduled Meds:   albuterol-ipratropium  3 mL Nebulization Q6H    fat emulsion 20%  250 mL Intravenous Daily    fluticasone-vilanterol  1 puff Inhalation Daily    heparin (porcine)  5,000 Units Subcutaneous Q8H    hydrocortisone sodium succinate  100 mg Intravenous Q8H    pantoprozole (PROTONIX) IV  40 mg Intravenous Q12H    piperacillin-tazobactam (ZOSYN) IVPB  4.5 g Intravenous Q8H    potassium chloride  50 mEq Oral Once    potassium chloride  50 mEq Oral Once    potassium phosphate IVPB  30 mmol Intravenous Once    pyridoxine (vitamin B6)  50 mg Oral Daily    scopolamine  1 patch Transdermal Q3 Days    theophylline  300 mg Oral Daily    vancomycin (VANCOCIN) IVPB  1,000 mg Intravenous Q12H     PRN Meds:sodium chloride, albuterol sulfate, dextrose 50 % in water (D50W), dextrose 50 % in water (D50W), diclofenac sodium, glucagon (human recombinant), glucose, glucose, insulin aspart U-100, ondansetron, promethazine (PHENERGAN) IVPB, sodium chloride 0.9%, sodium chloride 0.9%     Review of patient's allergies indicates:   Allergen Reactions    Ace inhibitors Swelling    Carvedilol      Other reaction(s): Other (See Comments)  blister    Hydralazine analogues     Metformin Nausea And Vomiting    Tetracycline Itching    Tetracyclines Swelling    Travatan (with benzalkonium) [travoprost (benzalkonium)]     Travoprost Itching     Other reaction(s): Other (See Comments)  Blurry vision     Objective:     Vital Signs (Most Recent):  Temp: 98.3 °F (36.8 °C) (01/27/19 0700)  Pulse: 63 (01/27/19 0800)  Resp: 18 (01/27/19 0800)  BP: (!) 148/67 (01/27/19 0700)  SpO2: 99 % (01/27/19 0800) Vital Signs (24h Range):  Temp:  [97.4 °F (36.3 °C)-98.3 °F (36.8 °C)] 98.3 °F (36.8 °C)  Pulse:  [63-90] 63  Resp:  [11-29] 18  SpO2:  [91 %-100 %] 99 %  BP:  (116-157)/(57-85) 148/67  Arterial Line BP: ()/(44-89) 165/75     Weight: 71.2 kg (157 lb)  Body mass index is 29.66 kg/m².    Intake/Output - Last 3 Shifts       01/25 0700 - 01/26 0659 01/26 0700 - 01/27 0659 01/27 0700 - 01/28 0659    P.O.       I.V. (mL/kg) 2462 (34.5) 8192.4 (115.1)     Blood 350 350     Other 3000      IV Piggyback 6250 844.5     TPN  670     Total Intake(mL/kg) 73763 (168.9) 58784.9 (141.2)     Urine (mL/kg/hr) 785 (0.5) 508 (0.3) 47 (0.3)    Drains 300 140     Other  2423 297    Stool 0      Total Output 1085 3071 344    Net +21926 +6985.9 -344           Stool Occurrence 0 x            Physical Exam   Constitutional: No distress.   HENT:   Head: Normocephalic and atraumatic.   Pulmonary/Chest:   Mechanically ventilated 40%/5 of PEEP   Abdominal:   Abdomen soft, midline incision with prevena vac, pelvic drain purulent  RLQ former incision abscess site clean   Neurological:   Arouses with stimulation   Skin: Skin is warm and dry. She is not diaphoretic.       Significant Labs:  CBC:   Recent Labs   Lab 01/27/19  0330   WBC 7.62   RBC 3.43*   HGB 9.3*   HCT 26.5*   PLT 59*   MCV 77*   MCH 27.1   MCHC 35.1     BMP:   Recent Labs   Lab 01/27/19  0330   *   *   K 4.3      CO2 21*   BUN <2*   CREATININE 0.5   CALCIUM 6.7*   MG 1.8     ABGs:   Recent Labs   Lab 01/27/19  0801   PH 7.520*   PCO2 30.5*   PO2 93   HCO3 24.9   POCSATURATED 98   BE 2     Lactate 6      Assessment/Plan:     Depression    Hold home meds     Hypophosphatemia    Replace prn     Debility    PT/OT when extubated     Hypokalemia    Replace prn     Generalized abdominal pain    64 yo female presenting with abdominal pain, nausea, elevated lactate s/p open drainage pelvic abscess 1/25 now with severe sepsis, lactic acidosis    -cont broad spec abx, f/u cultures- NGTD besides blood cx from admit staph x2  -nephrology consulted for SLED given severe acidosis which has improved     Severe malnutrition     -cont TPN     Essential hypertension    Hold home meds for now     Gastroesophageal reflux disease without esophagitis    ppi     (HFpEF) heart failure with preserved ejection fraction    Last echo 8/2018 with no WMAs, grade 1DD, EF 60  - strict I/O, daily weights     Moderate asthma without complication    -currently intubated, resume home meds when extubated     CAD (coronary artery disease)    - cards consulted for EKG changes, mild elevation in troponin, type II MI, rec resume DAPT when able       Insulin dependent diabetes mellitus    SSI         Sierra Real MD  General Surgery  [facility]

## 2019-01-27 NOTE — PROGRESS NOTES
Notified by lab client services of + MRSA blood culture from 1/24. Cultures drawn 1/25, 1/26 NTD, appropriate antibiotic coverage. Notified Dr. Williamson.  No new orders at this time, will continue to monitor.

## 2019-01-27 NOTE — SUBJECTIVE & OBJECTIVE
Interval History:   SLED initiated yesterday afternoon and clotted this morning.  Electrolytes are stable, acidosis corrected, now with alkalemia.  UOP in the past 24 hours 498 ml.  Net positive 6.7L/24h.      Review of patient's allergies indicates:   Allergen Reactions    Ace inhibitors Swelling    Carvedilol      Other reaction(s): Other (See Comments)  blister    Hydralazine analogues     Metformin Nausea And Vomiting    Tetracycline Itching    Tetracyclines Swelling    Travatan (with benzalkonium) [travoprost (benzalkonium)]     Travoprost Itching     Other reaction(s): Other (See Comments)  Blurry vision     Current Facility-Administered Medications   Medication Frequency    0.9%  NaCl infusion (CRRT USE ONLY) Continuous    0.9%  NaCl infusion (for blood administration) Q24H PRN    albuterol sulfate nebulizer solution 2.5 mg Q4H PRN    albuterol-ipratropium 2.5 mg-0.5 mg/3 mL nebulizer solution 3 mL Q6H    dexmedetomidine (PRECEDEX) 400mcg/100mL 0.9% NaCL infusion Continuous    dextrose 50 % in water (D50W) injection 12.5 g PRN    dextrose 50 % in water (D50W) injection 25 g PRN    diclofenac sodium 1 % gel 2 g Daily PRN    fat emulsion 20% infusion 250 mL Daily    fentaNYL 2500 mcg in 0.9% sodium chloride 250 mL infusion premix (titrating) Continuous    fluticasone-vilanterol 100-25 mcg/dose diskus inhaler 1 puff Daily    glucagon (human recombinant) injection 1 mg PRN    glucose chewable tablet 16 g PRN    glucose chewable tablet 24 g PRN    heparin (porcine) injection 5,000 Units Q8H    hydrocortisone sodium succinate injection 100 mg Q8H    insulin aspart U-100 pen 1-10 Units Q6H PRN    magnesium sulfate 2g in water 50mL IVPB (premix) PRN    norepinephrine 16 mg in dextrose 5 % 250 mL infusion Continuous    ondansetron injection 4 mg Q8H PRN    pantoprazole injection 40 mg Q12H    piperacillin-tazobactam 4.5 g in sodium chloride 0.9% 100 mL IVPB (ready to mix system) Q8H     potassium chloride CR capsule 50 mEq Once    potassium chloride CR capsule 50 mEq Once    potassium phosphate 30 mmol in dextrose 5 % 500 mL infusion Once    promethazine (PHENERGAN) 6.25 mg in dextrose 5 % 50 mL IVPB Q6H PRN    propofol (DIPRIVAN) 10 mg/mL infusion Continuous    pyridoxine (vitamin B6) tablet 50 mg Daily    scopolamine 1.3-1.5 mg (1 mg over 3 days) 1 patch Q3 Days    sodium chloride 0.9% flush 5 mL PRN    sodium chloride 0.9% flush 5 mL PRN    sodium glyceroPHOSPHATE 30 mmol in sodium chloride 0.9% 250 mL ivpb Once    theophylline 24 hr capsule 300 mg Daily    TPN ADULT CENTRAL LINE CUSTOM Continuous    TPN ADULT CENTRAL LINE CUSTOM Continuous    vancomycin in dextrose 5 % 1 gram/250 mL IVPB 1,000 mg Q12H    vasopressin (PITRESSIN) 0.2 Units/mL in dextrose 5 % 100 mL infusion Continuous       Objective:     Vital Signs (Most Recent):  Temp: 98.3 °F (36.8 °C) (01/27/19 0700)  Pulse: 63 (01/27/19 0800)  Resp: 18 (01/27/19 0800)  BP: (!) 148/67 (01/27/19 0700)  SpO2: 99 % (01/27/19 0800)  O2 Device (Oxygen Therapy): ventilator (01/27/19 0800) Vital Signs (24h Range):  Temp:  [97.4 °F (36.3 °C)-98.3 °F (36.8 °C)] 98.3 °F (36.8 °C)  Pulse:  [63-90] 63  Resp:  [11-29] 18  SpO2:  [91 %-100 %] 99 %  BP: (116-157)/(57-85) 148/67  Arterial Line BP: ()/(44-89) 165/75     Weight: 71.2 kg (157 lb) (01/26/19 0900)  Body mass index is 29.66 kg/m².  Body surface area is 1.75 meters squared.    I/O last 3 completed shifts:  In: 48433.9 [I.V.:8854.4; Blood:700; Other:3000; IV Piggyback:5994.5]  Out: 3880 [Urine:968; Drains:240; Other:2672]    Physical Exam   Constitutional: She appears well-developed. She appears ill. No distress. She is sedated and intubated.   HENT:   Head: Normocephalic and atraumatic.   Right Ear: External ear normal.   Left Ear: External ear normal.   Eyes: Conjunctivae are normal. Right eye exhibits no discharge. Left eye exhibits no discharge.   Neck: Neck supple.    Cardiovascular: Normal rate and regular rhythm. Exam reveals no gallop and no friction rub.   No murmur heard.  Pulmonary/Chest: Breath sounds normal. She is intubated. She has no wheezes. She has no rales.   Abdominal:   LUCIANO drain/wound vac to RLQ   Musculoskeletal: She exhibits edema (upper extremities). She exhibits no deformity.   Skin: Skin is warm. She is not diaphoretic.       Significant Labs:  CBC:   Recent Labs   Lab 01/27/19  0330   WBC 7.62   RBC 3.43*   HGB 9.3*   HCT 26.5*   PLT 59*   MCV 77*   MCH 27.1   MCHC 35.1     CMP:   Recent Labs   Lab 01/27/19  0000 01/27/19  0330   * 298*   CALCIUM 6.8* 6.7*   ALBUMIN 1.2* 1.1*   PROT 3.1*  --    * 135*   K 4.3 4.3   CO2 21* 21*    105   BUN <2* <2*   CREATININE 0.5 0.5   ALKPHOS 206*  --    ALT 10  --    AST 17  --    BILITOT 1.4*  --

## 2019-01-27 NOTE — ASSESSMENT & PLAN NOTE
- cards consulted for EKG changes, mild elevation in troponin, type II MI, rec resume DAPT when able

## 2019-01-27 NOTE — PLAN OF CARE
Problem: Adult Inpatient Plan of Care  Goal: Plan of Care Review  PMH: HTN, COPD, HF, DMII, CVA (2012; R-side weakness residual); hip fx; FTT; PE    DX: s/p ex-lap I&D R hemipelvis fluid collection 1/25 post-op hypotension/acidosis requiring fluid rescus.     1/26: SCUF overnight, electrolyte repletion, trending lactate. (9.6->6.9->6.2->6.0); restart TPN;     Nursing:   MAP >65    Q1 BG      Outcome: Ongoing (interventions implemented as appropriate)  Reviewed plan of care with Pt and family. Pt remains intubated on minimal vent settings, sedated with Propfol and fentanyl titrated for RASS and comfort. Pt following commands while on sedation. Pt indicating comfort levels, fentanyl increase due to reports of pain, increased restlessness and grimacing. Levo, vaso infusing to keep MAP >65, needs decreasing throughout the shift. Labs, ABGs monitored q4. Insulin initiated for hyperglycemia, BG monitored q1 and gtt adjusted per nomogram. UO adequate off of CRRT. Pt repositioned q2 or more frequently to decrease pressure points, no new skin breakdown noted. Family at bedside, updated on plan of care, all questions answered and emotional support provided as indicated. VSS at this time, will continue to monitor.

## 2019-01-27 NOTE — PROGRESS NOTES
[facility]  Critical Care - Surgery  Progress Note    Patient Name: Sheryl Martines  MRN: [id number]  Admission Date: 1/23/2019  Hospital Length of Stay: 3 days  Code Status: Full Code  Attending Provider: Monster Laurent MD  Primary Care Provider: Primary Doctor No   Principal Problem: Pelvic abscess in female    Subjective:     Hospital/ICU Course:  No notes on file    Interval History/Significant Events: R IJ placed for SLED. Clotted off this AM, restarted. Aggressive electrolyte repletion overnight, lactate down trending, ABG improved. SBP/HR stable on Prop gtt.      Follow-up For: Procedure(s) (LRB):  LAPAROTOMY, EXPLORATORY (N/A)  INCISION AND DRAINAGE, ABSCESS-  drainage of pelvic abscess (N/A)  EXCISION, ABSCESS- drainage abdominal wall abscess (N/A)  APPLICATION, WOUND VAC (N/A)    Post-Operative Day: 1 Day Post-Op    Objective:     Vital Signs (Most Recent):  Temp: 97.7 °F (36.5 °C) (01/26/19 0745)  Pulse: 101 (01/26/19 0715)  Resp: 20 (01/26/19 0715)  BP: (!) 105/49 (01/26/19 0415)  SpO2: 100 % (01/26/19 0715) Vital Signs (24h Range):  Temp:  [94.9 °F (34.9 °C)-98.6 °F (37 °C)] 97.7 °F (36.5 °C)  Pulse:  [] 101  Resp:  [14-34] 20  SpO2:  [93 %-100 %] 100 %  BP: ()/(40-88) 105/49  Arterial Line BP: ()/(35-63) 85/49     Weight: 71.4 kg (157 lb 6.5 oz)  Body mass index is 29.74 kg/m².      Intake/Output Summary (Last 24 hours) at 1/26/2019 0805  Last data filed at 1/26/2019 0700  Gross per 24 hour   Intake 47107 ml   Output 1125 ml   Net 45688 ml       Physical Exam   [id number]  Head: Normocephalic.   Eyes: Pupils are equal, round, and reactive to light.   Cardiovascular: Regular rhythm, rate  Pulmonary/Chest: No respiratory distress. She has no wheezes.   Intubated, mechanical breath sounds   Abdominal: Soft. She exhibits no distension.   Neurological:   Sedated, arouses to name, following commands  Skin: Skin is warm and dry.       Vents:  Vent Mode: A/C (01/26/19  0710)  Ventilator Initiated: Yes (01/26/19 0239)  Set Rate: 14 bmp (01/26/19 0710)  Vt Set: 350 mL (01/26/19 0710)  PEEP/CPAP: 5 cmH20 (01/26/19 0710)  Oxygen Concentration (%): 40 (01/26/19 0715)  Peak Airway Pressure: 33 cmH2O (01/26/19 0710)  Plateau Pressure: 20 cmH20 (01/26/19 0710)  Total Ve: 5.47 mL (01/26/19 0710)  F/VT Ratio<105 (RSBI): (!) 51.36 (01/26/19 0710)    Lines/Drains/Airways     Central Venous Catheter Line                 Port A Cath Single Lumen 01/23/19 1400 right subclavian 2 days         Percutaneous Central Line Insertion/Assessment - triple lumen  01/26/19 0010 left internal jugular less than 1 day          Drain                 Closed/Suction Drain 01/25/19 1020 Right;Ventral Abdomen Bulb 19 Fr. less than 1 day         Urethral Catheter 01/25/19 0934 Straight-tip;Non-latex 16 Fr. less than 1 day          Airway                 Airway - Non-Surgical 01/26/19 Endotracheal Tube less than 1 day          Arterial Line                 Arterial Line 01/25/19 1049 Left Radial less than 1 day          Pressure Ulcer                 Negative Pressure Wound Therapy  less than 1 day                Significant Labs:    CBC/Anemia Profile:  Recent Labs   Lab 01/24/19  0832 01/24/19  0833  01/25/19  1551 01/25/19  2112 01/26/19  0015 01/26/19  0455   WBC  --   --    < > 7.81  7.81  --  11.45 10.43   HGB  --   --    < > 8.5*  8.5*  --  7.6* 9.0*   HCT  --   --    < > 26.4*  26.4* 28* 24.7* 28.1*   PLT  --   --    < > 175  175  --  153 134*   MCV  --   --    < > 79*  79*  --  81* 83   RDW  --   --    < > 18.6*  18.6*  --  18.6* 19.4*   IRON 53  --   --   --   --   --   --    FERRITIN 990*  --   --   --   --   --   --    FOLATE  --  2.1*  --   --   --   --   --    [id number]  --  1523*  --   --   --   --   --     < > = values in this interval not displayed.        Chemistries:  Recent Labs   Lab 01/25/19  1551 01/26/19  0015 01/26/19  0455   * 140 134*   K 3.2* 2.9* 3.8    114* 108   CO2  16* 12* 12*   BUN 2* 2* 2*   CREATININE 0.7 0.6 0.7   CALCIUM 7.6* 6.7* 7.6*   ALBUMIN 1.6* 1.3* 1.4*   PROT 3.8* 3.2* 3.4*   BILITOT 1.4* 1.3* 1.3*   ALKPHOS 299* 235* 245*   ALT 11 9* 10   AST 32 27 29   MG 1.7 1.2* 1.2*   PHOS 2.3* 2.1* 2.1*       All pertinent labs within the past 24 hours have been reviewed.    Significant Imaging:  I have reviewed all pertinent imaging results/findings within the past 24 hours.    Assessment/Plan:     * Pelvic abscess in female    63 y.o. female with hx of asthma, COPD, CAD, DM, HTN, CVA  with R side deficits, HFpEF, severe debility, lap converted to open appendectomy 2018 complicated by cdiff, failure to thrive.  Hx of difficult intubation and delayed emergence, CO2 narcosis needing ICU stay.  Hx of PRN O2 at night. Patient now s/p exploratory laparotomy, washout, and pelvic abscess drainage on 19.    Post-operatively, on initial evaluation in PACU, patient hypotensive to 60s/40s, on BiPap. Currently fluid resuscitating with 3rd liter of LR, responding well to phenylephrine, starting phenylephrine infusion (tachycardic to ~118). ABG 7.11/49/116/16, BE -14, on bipap 14/5, 35% FiO2.     Neuro:  Sedation: Prop gtt @ 15  Pain: Fentanyl gtt @ 50     CV:  - decreasing pressor requirements - levo .02, vaso .04   - wean pressors as able  - Lactate 6.0, downtrending    Pulm:   - Intubated  - Latest AB.55/71/25.9/22.7 - decreased rate, increased FiO2  Repeat ABG below.   Recent Labs     19  0537   PH 7.532*   PCO2 28.8*   PO2 102*   HCO3 24.2   POCSATURATED 99   BE 2     - serial ABGs  - daily CXR  Vent Mode: A/C  Oxygen Concentration (%):  [29-40] 40  Resp Rate Total:  [14 br/min-29 br/min] 18 br/min  Vt Set:  [350 mL] 350 mL  PEEP/CPAP:  [5 cmH20] 5 cmH20  Mean Airway Pressure:  [8.6 cmH20-10 cmH20] 8.7 cmH20      Renal:  Trend BUN/Cr: <2/0.5  Monitor Urine output - 440cc urine, 2.2 SLED  Nephro following, appreciate recs    ID:  AF   blood cultures: gram  positive cocci in clusters resembling staph  1/25 operative cultures pending, ngtd thus far  - on vanc/zosyn  - WBC 7.6    FEN/GI:  TPN @ 75  Replace lytes PRN, aggressive repletion overnight, q8 labs  ppi  Lactate 6.0, continue to trend, q4h labs  20 cc drain, 50 WV, 120 OG      Endo:  Patient with IDDM2, on detemir 20u BID at home  Mod correction SSI (increased from low dose), lastest   Monitor closely, BG q4    Heme:  Trend H&H : 9.3/27  Post-op hgb 8.5    Dispo: Cont ICU care   Primary: SICU/general surgery            Critical care was time spent personally by me on the following activities: development of treatment plan with patient or surrogate and bedside caregivers, discussions with consultants, evaluation of patient's response to treatment, examination of patient, ordering and performing treatments and interventions, ordering and review of laboratory studies, ordering and review of radiographic studies, pulse oximetry, re-evaluation of patient's condition.  This critical care time did not overlap with that of any other provider or involve time for any procedures.     Nunu Umanzor MD  Critical Care - Surgery  [facility]

## 2019-01-27 NOTE — PROGRESS NOTES
[facility]  Cardiology  Progress Note    Patient Name: Sheryl Martines  MRN: [id number]  Admission Date: 1/23/2019  Hospital Length of Stay: 3 days  Code Status: Full Code   Attending Physician: Monster Laurent MD   Primary Care Physician: Primary Doctor No  Expected Discharge Date: 1/25/2019  Principal Problem:Pelvic abscess in female    Subjective:     Hospital Course:   No notes on file    Interval History: No events overnight, improving acidosis    Tele: SR with HR 65-80    ROS  Objective:     Vital Signs (Most Recent):  Temp: 98.3 °F (36.8 °C) (01/27/19 0700)  Pulse: 69 (01/27/19 1145)  Resp: 15 (01/27/19 1145)  BP: 139/64 (01/27/19 1100)  SpO2: 100 % (01/27/19 1145) Vital Signs (24h Range):  Temp:  [97.6 °F (36.4 °C)-98.3 °F (36.8 °C)] 98.3 °F (36.8 °C)  Pulse:  [63-86] 69  Resp:  [11-29] 15  SpO2:  [93 %-100 %] 100 %  BP: (116-157)/(57-85) 139/64  Arterial Line BP: ()/(44-75) 136/53     Weight: 71.2 kg (157 lb)  Body mass index is 29.66 kg/m².     SpO2: 100 %  O2 Device (Oxygen Therapy): ventilator      Intake/Output Summary (Last 24 hours) at 1/27/2019 1159  Last data filed at 1/27/2019 1100  Gross per 24 hour   Intake 9706.94 ml   Output 3411 ml   Net 6295.94 ml       Lines/Drains/Airways     Central Venous Catheter Line                 Port A Cath Single Lumen 01/23/19 1400 right subclavian 3 days         Percutaneous Central Line Insertion/Assessment - triple lumen  01/26/19 0010 left internal jugular 1 day         Trialysis (Dialysis) Catheter 01/26/19 1200 right internal jugular less than 1 day          Drain                 Closed/Suction Drain 01/25/19 1020 Right;Ventral Abdomen Bulb 19 Fr. 2 days         Urethral Catheter 01/25/19 0934 Straight-tip;Non-latex 16 Fr. 2 days         NG/OG Tube 01/26/19 1200 Center mouth less than 1 day          Airway                 Airway - Non-Surgical 01/26/19 Endotracheal Tube 1 day          Arterial Line                 Arterial Line  01/25/19 1049 Left Radial 2 days          Pressure Ulcer                 Negative Pressure Wound Therapy  2 days                Physical Exam   Constitutional: She is oriented to person, place, and time. She appears well-developed and well-nourished.   HENT:   Head: Normocephalic and atraumatic.   Nose: Nose normal.   Mouth/Throat: No oropharyngeal exudate.   Eyes: Right eye exhibits no discharge. Left eye exhibits no discharge. No scleral icterus.   Neck: Normal range of motion. Neck supple. No JVD present.   Cardiovascular: Normal rate, regular rhythm, S1 normal and S2 normal. Exam reveals no gallop, no S3, no S4, no distant heart sounds, no friction rub, no midsystolic click and no opening snap.   No murmur heard.  Pulses:       Radial pulses are 2+ on the right side, and 2+ on the left side.        Femoral pulses are 2+ on the right side, and 2+ on the left side.  Pulmonary/Chest: Effort normal. No respiratory distress. She has no wheezes. She has rales. She exhibits no tenderness.   Abdominal: Soft. Bowel sounds are normal. She exhibits no distension. There is tenderness. There is no rebound.   Musculoskeletal: Normal range of motion. She exhibits no edema, tenderness or deformity.   Lymphadenopathy:     She has no cervical adenopathy.   Neurological: She is alert and oriented to person, place, and time. No cranial nerve deficit.   Skin: Skin is warm and dry.   Psychiatric: She has a normal mood and affect. Her behavior is normal.       Significant Labs:   ABG:   Recent Labs   Lab 01/27/19  0334 01/27/19  0537 01/27/19  0801   PH 7.550* 7.532* 7.520*   PCO2 25.9* 28.8* 30.5*   HCO3 22.7* 24.2 24.9   POCSATURATED 96 99 98   BE 0 2 2   , CMP   Recent Labs   Lab 01/26/19  1528  01/27/19  0000 01/27/19  0330 01/27/19  0813   *   < > 134* 135* 136   K 3.9   < > 4.3 4.3 4.3      < > 104 105 105   CO2 7*   < > 21* 21* 22*   *   < > 267* 298* 371*   BUN 2*   < > <2* <2* 2*   CREATININE 0.8   < > 0.5  0.5 0.5   CALCIUM 7.3*   < > 6.8* 6.7* 6.6*   PROT 3.4*  --  3.1*  --  3.0*   ALBUMIN 1.3*   < > 1.2* 1.1* 1.1*   BILITOT 2.1*  --  1.4*  --  1.6*   ALKPHOS 236*  --  206*  --  209*   AST 22  --  17  --  14   ALT 10  --  10  --  8*   ANIONGAP 16   < > 9 9 9   ESTGFRAFRICA >60.0   < > >60.0 >60.0 >60.0   EGFRNONAA >60.0   < > >60.0 >60.0 >60.0    < > = values in this interval not displayed.   , CBC   Recent Labs   Lab 01/26/19  0015 01/26/19  0455 01/27/19  0330   WBC 11.45 10.43 7.62   HGB 7.6* 9.0* 9.3*   HCT 24.7* 28.1* 26.5*    134* 59*    and Troponin   Recent Labs   Lab 01/26/19  0015 01/26/19  1055 01/26/19  2153   TROPONINI 0.061* 0.081* 0.038*     Assessment and Plan:       Elevated troponin    Patient with hx of CAD who is s/p laparotomy for abdominal infection with sepsis and severe lactic acidosis, on pressors and scheduled duonebs. CXR showed pulm congestion and/or effusion. Troponin at a low level, no significant findings on echo, decreasing enzymes with hemodynamic stabilization confirm the diagnosis of myocardial injury due to all concomitant medical/surgical problems. EKG showed accelerated idioventricular rhythm which is resolving. We recommend the following:    - Continue supportive care as per primary and consulting teams  - will sign off, thank you for the consult, please call back with any questions         VTE Risk Mitigation (From admission, onward)        Ordered     heparin (porcine) injection 5,000 Units  Every 8 hours      01/26/19 0900     IP VTE HIGH RISK PATIENT  Once      01/25/19 1049     Place sequential compression device  Until discontinued      01/25/19 1049     Place sequential compression device  Until discontinued      01/24/19 0027     Place MARGIE hose  Until discontinued      01/24/19 0027          Ruben Winn MD  Cardiology  [facility]

## 2019-01-27 NOTE — PROGRESS NOTES
[facility]  Nephrology  Progress Note    Patient Name: Sheryl Martines  MRN: [id number]  Admission Date: 1/23/2019  Hospital Length of Stay: 3 days  Attending Provider: Monster Laurent MD   Primary Care Physician: Primary Doctor No  Principal Problem:Pelvic abscess in female    Subjective:     Interval History:   SLED initiated yesterday afternoon and clotted this morning.  Electrolytes are stable, acidosis corrected, now with alkalemia.  UOP in the past 24 hours 498 ml.  Net positive 6.7L/24h.      Review of patient's allergies indicates:   Allergen Reactions    Ace inhibitors Swelling    Carvedilol      Other reaction(s): Other (See Comments)  blister    Hydralazine analogues     Metformin Nausea And Vomiting    Tetracycline Itching    Tetracyclines Swelling    Travatan (with benzalkonium) [travoprost (benzalkonium)]     Travoprost Itching     Other reaction(s): Other (See Comments)  Blurry vision     Current Facility-Administered Medications   Medication Frequency    0.9%  NaCl infusion (CRRT USE ONLY) Continuous    0.9%  NaCl infusion (for blood administration) Q24H PRN    albuterol sulfate nebulizer solution 2.5 mg Q4H PRN    albuterol-ipratropium 2.5 mg-0.5 mg/3 mL nebulizer solution 3 mL Q6H    dexmedetomidine (PRECEDEX) 400mcg/100mL 0.9% NaCL infusion Continuous    dextrose 50 % in water (D50W) injection 12.5 g PRN    dextrose 50 % in water (D50W) injection 25 g PRN    diclofenac sodium 1 % gel 2 g Daily PRN    fat emulsion 20% infusion 250 mL Daily    fentaNYL 2500 mcg in 0.9% sodium chloride 250 mL infusion premix (titrating) Continuous    fluticasone-vilanterol 100-25 mcg/dose diskus inhaler 1 puff Daily    glucagon (human recombinant) injection 1 mg PRN    glucose chewable tablet 16 g PRN    glucose chewable tablet 24 g PRN    heparin (porcine) injection 5,000 Units Q8H    hydrocortisone sodium succinate injection 100 mg Q8H    insulin aspart U-100 pen  1-10 Units Q6H PRN    magnesium sulfate 2g in water 50mL IVPB (premix) PRN    norepinephrine 16 mg in dextrose 5 % 250 mL infusion Continuous    ondansetron injection 4 mg Q8H PRN    pantoprazole injection 40 mg Q12H    piperacillin-tazobactam 4.5 g in sodium chloride 0.9% 100 mL IVPB (ready to mix system) Q8H    potassium chloride CR capsule 50 mEq Once    potassium chloride CR capsule 50 mEq Once    potassium phosphate 30 mmol in dextrose 5 % 500 mL infusion Once    promethazine (PHENERGAN) 6.25 mg in dextrose 5 % 50 mL IVPB Q6H PRN    propofol (DIPRIVAN) 10 mg/mL infusion Continuous    pyridoxine (vitamin B6) tablet 50 mg Daily    scopolamine 1.3-1.5 mg (1 mg over 3 days) 1 patch Q3 Days    sodium chloride 0.9% flush 5 mL PRN    sodium chloride 0.9% flush 5 mL PRN    sodium glyceroPHOSPHATE 30 mmol in sodium chloride 0.9% 250 mL ivpb Once    theophylline 24 hr capsule 300 mg Daily    TPN ADULT CENTRAL LINE CUSTOM Continuous    TPN ADULT CENTRAL LINE CUSTOM Continuous    vancomycin in dextrose 5 % 1 gram/250 mL IVPB 1,000 mg Q12H    vasopressin (PITRESSIN) 0.2 Units/mL in dextrose 5 % 100 mL infusion Continuous       Objective:     Vital Signs (Most Recent):  Temp: 98.3 °F (36.8 °C) (01/27/19 0700)  Pulse: 63 (01/27/19 0800)  Resp: 18 (01/27/19 0800)  BP: (!) 148/67 (01/27/19 0700)  SpO2: 99 % (01/27/19 0800)  O2 Device (Oxygen Therapy): ventilator (01/27/19 0800) Vital Signs (24h Range):  Temp:  [97.4 °F (36.3 °C)-98.3 °F (36.8 °C)] 98.3 °F (36.8 °C)  Pulse:  [63-90] 63  Resp:  [11-29] 18  SpO2:  [91 %-100 %] 99 %  BP: (116-157)/(57-85) 148/67  Arterial Line BP: ()/(44-89) 165/75     Weight: 71.2 kg (157 lb) (01/26/19 0900)  Body mass index is 29.66 kg/m².  Body surface area is 1.75 meters squared.    I/O last 3 completed shifts:  In: 35768.9 [I.V.:8854.4; Blood:700; Other:3000; IV Piggyback:5994.5]  Out: 3880 [Urine:968; Drains:240; Other:2672]    Physical Exam   Constitutional: She  appears well-developed. She appears ill. No distress. She is sedated and intubated.   HENT:   Head: Normocephalic and atraumatic.   Right Ear: External ear normal.   Left Ear: External ear normal.   Eyes: Conjunctivae are normal. Right eye exhibits no discharge. Left eye exhibits no discharge.   Neck: Neck supple.   Cardiovascular: Normal rate and regular rhythm. Exam reveals no gallop and no friction rub.   No murmur heard.  Pulmonary/Chest: Breath sounds normal. She is intubated. She has no wheezes. She has no rales.   Abdominal:   LUCIANO drain/wound vac to RLQ   Musculoskeletal: She exhibits edema (upper extremities). She exhibits no deformity.   Skin: Skin is warm. She is not diaphoretic.       Significant Labs:  CBC:   Recent Labs   Lab 01/27/19  0330   WBC 7.62   RBC 3.43*   HGB 9.3*   HCT 26.5*   PLT 59*   MCV 77*   MCH 27.1   MCHC 35.1     CMP:   Recent Labs   Lab 01/27/19  0000 01/27/19  0330   * 298*   CALCIUM 6.8* 6.7*   ALBUMIN 1.2* 1.1*   PROT 3.1*  --    * 135*   K 4.3 4.3   CO2 21* 21*    105   BUN <2* <2*   CREATININE 0.5 0.5   ALKPHOS 206*  --    ALT 10  --    AST 17  --    BILITOT 1.4*  --             Assessment/Plan:     NIKHIL (acute kidney injury)    NIKHIL on normal renal function    62 yo AAM who presented with worsening nausea/vomiting for several months.  Imaging revealed possible abscess to past appendectomy site and she underwent exp/lap with abscess drainage on 1/25.  Post-op course complicated by hypotension and lactic acidosis.  She was volume resuscitated with 5L of crystalloids.  On the morning of 1/26, UOP has slowly trended down and now with anuria.     NIKHIL 2/2 Ischemic ATN vs. Septic ATN        Plan/recommendations:  -will discontinue SLED at this time  -continue strict I/O's  -lactate remains elevated, with metabolic acidosis resolved with SLED now with alkalemia on ABG  -CVP of 5 this morning and vent settings stable.  -if she requires increased FiO2 requirements,  recommend lasix 80 mg IV x 1 for volume management.  -will repeat renal panel this afternoon to determine needs for further RRT.         Miah Montanez NP  Nephrology  [facility]

## 2019-01-27 NOTE — SUBJECTIVE & OBJECTIVE
Interval History: No events overnight, improving acidosis    Tele: SR with HR 65-80    ROS  Objective:     Vital Signs (Most Recent):  Temp: 98.3 °F (36.8 °C) (01/27/19 0700)  Pulse: 69 (01/27/19 1145)  Resp: 15 (01/27/19 1145)  BP: 139/64 (01/27/19 1100)  SpO2: 100 % (01/27/19 1145) Vital Signs (24h Range):  Temp:  [97.6 °F (36.4 °C)-98.3 °F (36.8 °C)] 98.3 °F (36.8 °C)  Pulse:  [63-86] 69  Resp:  [11-29] 15  SpO2:  [93 %-100 %] 100 %  BP: (116-157)/(57-85) 139/64  Arterial Line BP: ()/(44-75) 136/53     Weight: 71.2 kg (157 lb)  Body mass index is 29.66 kg/m².     SpO2: 100 %  O2 Device (Oxygen Therapy): ventilator      Intake/Output Summary (Last 24 hours) at 1/27/2019 1159  Last data filed at 1/27/2019 1100  Gross per 24 hour   Intake 9706.94 ml   Output 3411 ml   Net 6295.94 ml       Lines/Drains/Airways     Central Venous Catheter Line                 Port A Cath Single Lumen 01/23/19 1400 right subclavian 3 days         Percutaneous Central Line Insertion/Assessment - triple lumen  01/26/19 0010 left internal jugular 1 day         Trialysis (Dialysis) Catheter 01/26/19 1200 right internal jugular less than 1 day          Drain                 Closed/Suction Drain 01/25/19 1020 Right;Ventral Abdomen Bulb 19 Fr. 2 days         Urethral Catheter 01/25/19 0934 Straight-tip;Non-latex 16 Fr. 2 days         NG/OG Tube 01/26/19 1200 Center mouth less than 1 day          Airway                 Airway - Non-Surgical 01/26/19 Endotracheal Tube 1 day          Arterial Line                 Arterial Line 01/25/19 1049 Left Radial 2 days          Pressure Ulcer                 Negative Pressure Wound Therapy  2 days                Physical Exam   Constitutional: She is oriented to person, place, and time. She appears well-developed and well-nourished.   HENT:   Head: Normocephalic and atraumatic.   Nose: Nose normal.   Mouth/Throat: No oropharyngeal exudate.   Eyes: Right eye exhibits no discharge. Left eye exhibits  no discharge. No scleral icterus.   Neck: Normal range of motion. Neck supple. No JVD present.   Cardiovascular: Normal rate, regular rhythm, S1 normal and S2 normal. Exam reveals no gallop, no S3, no S4, no distant heart sounds, no friction rub, no midsystolic click and no opening snap.   No murmur heard.  Pulses:       Radial pulses are 2+ on the right side, and 2+ on the left side.        Femoral pulses are 2+ on the right side, and 2+ on the left side.  Pulmonary/Chest: Effort normal. No respiratory distress. She has no wheezes. She has rales. She exhibits no tenderness.   Abdominal: Soft. Bowel sounds are normal. She exhibits no distension. There is tenderness. There is no rebound.   Musculoskeletal: Normal range of motion. She exhibits no edema, tenderness or deformity.   Lymphadenopathy:     She has no cervical adenopathy.   Neurological: She is alert and oriented to person, place, and time. No cranial nerve deficit.   Skin: Skin is warm and dry.   Psychiatric: She has a normal mood and affect. Her behavior is normal.       Significant Labs:   ABG:   Recent Labs   Lab 01/27/19  0334 01/27/19  0537 01/27/19  0801   PH 7.550* 7.532* 7.520*   PCO2 25.9* 28.8* 30.5*   HCO3 22.7* 24.2 24.9   POCSATURATED 96 99 98   BE 0 2 2   , CMP   Recent Labs   Lab 01/26/19  1528  01/27/19  0000 01/27/19  0330 01/27/19  0813   *   < > 134* 135* 136   K 3.9   < > 4.3 4.3 4.3      < > 104 105 105   CO2 7*   < > 21* 21* 22*   *   < > 267* 298* 371*   BUN 2*   < > <2* <2* 2*   CREATININE 0.8   < > 0.5 0.5 0.5   CALCIUM 7.3*   < > 6.8* 6.7* 6.6*   PROT 3.4*  --  3.1*  --  3.0*   ALBUMIN 1.3*   < > 1.2* 1.1* 1.1*   BILITOT 2.1*  --  1.4*  --  1.6*   ALKPHOS 236*  --  206*  --  209*   AST 22  --  17  --  14   ALT 10  --  10  --  8*   ANIONGAP 16   < > 9 9 9   ESTGFRAFRICA >60.0   < > >60.0 >60.0 >60.0   EGFRNONAA >60.0   < > >60.0 >60.0 >60.0    < > = values in this interval not displayed.   , CBC   Recent Labs    Lab 01/26/19  0015 01/26/19  0455 01/27/19  0330   WBC 11.45 10.43 7.62   HGB 7.6* 9.0* 9.3*   HCT 24.7* 28.1* 26.5*    134* 59*    and Troponin   Recent Labs   Lab 01/26/19  0015 01/26/19  1055 01/26/19  2153   TROPONINI 0.061* 0.081* 0.038*

## 2019-01-28 ENCOUNTER — TELEPHONE (OUTPATIENT)
Dept: ADMINISTRATIVE | Facility: CLINIC | Age: 64
End: 2019-01-28

## 2019-01-28 PROBLEM — T38.0X5S ADVERSE EFFECT OF ADRENAL CORTICAL STEROIDS, SEQUELA: Status: ACTIVE | Noted: 2019-01-28

## 2019-01-28 PROBLEM — E66.3 OVERWEIGHT (BMI 25.0-29.9): Status: ACTIVE | Noted: 2019-01-28

## 2019-01-28 LAB
ABO + RH BLD: NORMAL
ALBUMIN SERPL BCP-MCNC: 1.4 G/DL
ALBUMIN SERPL BCP-MCNC: 1.6 G/DL
ALBUMIN SERPL BCP-MCNC: 1.7 G/DL
ALLENS TEST: ABNORMAL
ALLENS TEST: NORMAL
ALP SERPL-CCNC: 263 U/L
ALP SERPL-CCNC: 307 U/L
ALP SERPL-CCNC: 342 U/L
ALP SERPL-CCNC: 398 U/L
ALP SERPL-CCNC: 407 U/L
ALT SERPL W/O P-5'-P-CCNC: 13 U/L
ALT SERPL W/O P-5'-P-CCNC: 16 U/L
ALT SERPL W/O P-5'-P-CCNC: 21 U/L
ALT SERPL W/O P-5'-P-CCNC: 31 U/L
ALT SERPL W/O P-5'-P-CCNC: 39 U/L
ANION GAP SERPL CALC-SCNC: 5 MMOL/L
ANION GAP SERPL CALC-SCNC: 5 MMOL/L
ANION GAP SERPL CALC-SCNC: 6 MMOL/L
ANION GAP SERPL CALC-SCNC: 8 MMOL/L
ANION GAP SERPL CALC-SCNC: 8 MMOL/L
ANISOCYTOSIS BLD QL SMEAR: SLIGHT
ANISOCYTOSIS BLD QL SMEAR: SLIGHT
AST SERPL-CCNC: 111 U/L
AST SERPL-CCNC: 48 U/L
AST SERPL-CCNC: 60 U/L
AST SERPL-CCNC: 70 U/L
AST SERPL-CCNC: 98 U/L
BACTERIA SPEC AEROBE CULT: NO GROWTH
BACTERIA SPEC AEROBE CULT: NO GROWTH
BASOPHILS # BLD AUTO: 0.01 K/UL
BASOPHILS # BLD AUTO: ABNORMAL K/UL
BASOPHILS NFR BLD: 0 %
BASOPHILS NFR BLD: 0.1 %
BILIRUB SERPL-MCNC: 2.4 MG/DL
BILIRUB SERPL-MCNC: 2.6 MG/DL
BILIRUB SERPL-MCNC: 2.7 MG/DL
BILIRUB SERPL-MCNC: 2.7 MG/DL
BILIRUB SERPL-MCNC: 2.8 MG/DL
BLD GP AB SCN CELLS X3 SERPL QL: NORMAL
BUN SERPL-MCNC: 5 MG/DL
BUN SERPL-MCNC: 5 MG/DL
BUN SERPL-MCNC: 6 MG/DL
BURR CELLS BLD QL SMEAR: ABNORMAL
CA-I BLDV-SCNC: 1.09 MMOL/L
CA-I BLDV-SCNC: 1.09 MMOL/L
CA-I BLDV-SCNC: 1.11 MMOL/L
CA-I BLDV-SCNC: 1.11 MMOL/L
CALCIUM SERPL-MCNC: 7.3 MG/DL
CALCIUM SERPL-MCNC: 7.6 MG/DL
CALCIUM SERPL-MCNC: 7.6 MG/DL
CALCIUM SERPL-MCNC: 7.7 MG/DL
CALCIUM SERPL-MCNC: 7.9 MG/DL
CHLORIDE SERPL-SCNC: 103 MMOL/L
CHLORIDE SERPL-SCNC: 104 MMOL/L
CHLORIDE SERPL-SCNC: 106 MMOL/L
CHLORIDE SERPL-SCNC: 106 MMOL/L
CHLORIDE SERPL-SCNC: 108 MMOL/L
CO2 SERPL-SCNC: 23 MMOL/L
CO2 SERPL-SCNC: 24 MMOL/L
CO2 SERPL-SCNC: 25 MMOL/L
CO2 SERPL-SCNC: 26 MMOL/L
CO2 SERPL-SCNC: 26 MMOL/L
CREAT SERPL-MCNC: 0.5 MG/DL
CREAT SERPL-MCNC: 0.6 MG/DL
DELSYS: ABNORMAL
DELSYS: NORMAL
DIFFERENTIAL METHOD: ABNORMAL
DIFFERENTIAL METHOD: ABNORMAL
EOSINOPHIL # BLD AUTO: 0 K/UL
EOSINOPHIL # BLD AUTO: ABNORMAL K/UL
EOSINOPHIL NFR BLD: 0 %
EOSINOPHIL NFR BLD: 0 %
ERYTHROCYTE [DISTWIDTH] IN BLOOD BY AUTOMATED COUNT: 18.6 %
ERYTHROCYTE [DISTWIDTH] IN BLOOD BY AUTOMATED COUNT: 18.6 %
ERYTHROCYTE [SEDIMENTATION RATE] IN BLOOD BY WESTERGREN METHOD: 15 MM/H
ERYTHROCYTE [SEDIMENTATION RATE] IN BLOOD BY WESTERGREN METHOD: 15 MM/H
EST. GFR  (AFRICAN AMERICAN): >60 ML/MIN/1.73 M^2
EST. GFR  (NON AFRICAN AMERICAN): >60 ML/MIN/1.73 M^2
FIO2: 40
FIO2: 40
GIANT PLATELETS BLD QL SMEAR: PRESENT
GLUCOSE SERPL-MCNC: 232 MG/DL
GLUCOSE SERPL-MCNC: 375 MG/DL
GLUCOSE SERPL-MCNC: 450 MG/DL
GLUCOSE SERPL-MCNC: 66 MG/DL
GLUCOSE SERPL-MCNC: 73 MG/DL (ref 70–110)
GLUCOSE SERPL-MCNC: 91 MG/DL
HCO3 UR-SCNC: 20.6 MMOL/L (ref 24–28)
HCO3 UR-SCNC: 25.2 MMOL/L (ref 24–28)
HCO3 UR-SCNC: 26.5 MMOL/L (ref 24–28)
HCT VFR BLD AUTO: 22.3 %
HCT VFR BLD AUTO: 23.7 %
HCT VFR BLD CALC: 28 %PCV (ref 36–54)
HGB BLD-MCNC: 8.1 G/DL
HGB BLD-MCNC: 8.3 G/DL
HYPOCHROMIA BLD QL SMEAR: ABNORMAL
HYPOCHROMIA BLD QL SMEAR: ABNORMAL
IMM GRANULOCYTES # BLD AUTO: 0.25 K/UL
IMM GRANULOCYTES # BLD AUTO: ABNORMAL K/UL
IMM GRANULOCYTES NFR BLD AUTO: 1.9 %
IMM GRANULOCYTES NFR BLD AUTO: ABNORMAL %
LACTATE SERPL-SCNC: 2.3 MMOL/L
LACTATE SERPL-SCNC: 3.2 MMOL/L
LACTATE SERPL-SCNC: 3.4 MMOL/L
LACTATE SERPL-SCNC: 3.6 MMOL/L
LACTATE SERPL-SCNC: 3.9 MMOL/L
LACTATE SERPL-SCNC: 4 MMOL/L
LYMPHOCYTES # BLD AUTO: 0.8 K/UL
LYMPHOCYTES # BLD AUTO: ABNORMAL K/UL
LYMPHOCYTES NFR BLD: 4 %
LYMPHOCYTES NFR BLD: 5.7 %
MAGNESIUM SERPL-MCNC: 1.6 MG/DL
MAGNESIUM SERPL-MCNC: 1.8 MG/DL
MAGNESIUM SERPL-MCNC: 1.9 MG/DL
MAGNESIUM SERPL-MCNC: 2.2 MG/DL
MAGNESIUM SERPL-MCNC: 2.3 MG/DL
MCH RBC QN AUTO: 26.8 PG
MCH RBC QN AUTO: 28.2 PG
MCHC RBC AUTO-ENTMCNC: 35 G/DL
MCHC RBC AUTO-ENTMCNC: 36.3 G/DL
MCV RBC AUTO: 77 FL
MCV RBC AUTO: 78 FL
MIN VOL: 5
MIN VOL: 6
MODE: ABNORMAL
MODE: NORMAL
MONOCYTES # BLD AUTO: 0.9 K/UL
MONOCYTES # BLD AUTO: ABNORMAL K/UL
MONOCYTES NFR BLD: 0 %
MONOCYTES NFR BLD: 6.8 %
NEUTROPHILS # BLD AUTO: 11.3 K/UL
NEUTROPHILS NFR BLD: 85.5 %
NEUTROPHILS NFR BLD: 90 %
NEUTS BAND NFR BLD MANUAL: 6 %
NRBC BLD-RTO: 0 /100 WBC
NRBC BLD-RTO: 0 /100 WBC
PAPPENHEIMER BOD BLD QL SMEAR: ABNORMAL
PAPPENHEIMER BOD BLD QL SMEAR: PRESENT
PCO2 BLDA: 40 MMHG (ref 35–45)
PCO2 BLDA: 43.6 MMHG (ref 35–45)
PCO2 BLDA: 43.8 MMHG (ref 35–45)
PEEP: 5
PEEP: 5
PH SMN: 7.32 [PH] (ref 7.35–7.45)
PH SMN: 7.37 [PH] (ref 7.35–7.45)
PH SMN: 7.39 [PH] (ref 7.35–7.45)
PHOSPHATE SERPL-MCNC: 1.7 MG/DL
PHOSPHATE SERPL-MCNC: 1.7 MG/DL
PHOSPHATE SERPL-MCNC: 2 MG/DL
PHOSPHATE SERPL-MCNC: 2.3 MG/DL
PHOSPHATE SERPL-MCNC: 2.7 MG/DL
PIP: 22
PIP: 24
PLATELET # BLD AUTO: 32 K/UL
PLATELET # BLD AUTO: 42 K/UL
PLATELET BLD QL SMEAR: ABNORMAL
PMV BLD AUTO: ABNORMAL FL
PMV BLD AUTO: ABNORMAL FL
PO2 BLDA: 120 MMHG (ref 80–100)
PO2 BLDA: 488 MMHG (ref 80–100)
PO2 BLDA: 98 MMHG (ref 80–100)
POC BE: -6 MMOL/L
POC BE: 0 MMOL/L
POC BE: 2 MMOL/L
POC IONIZED CALCIUM: 1.1 MMOL/L (ref 1.06–1.42)
POC SATURATED O2: 100 % (ref 95–100)
POC SATURATED O2: 97 % (ref 95–100)
POC SATURATED O2: 99 % (ref 95–100)
POC TCO2: 22 MMOL/L (ref 23–27)
POC TCO2: 26 MMOL/L (ref 23–27)
POC TCO2: 28 MMOL/L (ref 23–27)
POCT GLUCOSE: 100 MG/DL (ref 70–110)
POCT GLUCOSE: 108 MG/DL (ref 70–110)
POCT GLUCOSE: 108 MG/DL (ref 70–110)
POCT GLUCOSE: 109 MG/DL (ref 70–110)
POCT GLUCOSE: 112 MG/DL (ref 70–110)
POCT GLUCOSE: 144 MG/DL (ref 70–110)
POCT GLUCOSE: 153 MG/DL (ref 70–110)
POCT GLUCOSE: 166 MG/DL (ref 70–110)
POCT GLUCOSE: 204 MG/DL (ref 70–110)
POCT GLUCOSE: 229 MG/DL (ref 70–110)
POCT GLUCOSE: 247 MG/DL (ref 70–110)
POCT GLUCOSE: 278 MG/DL (ref 70–110)
POCT GLUCOSE: 302 MG/DL (ref 70–110)
POCT GLUCOSE: 324 MG/DL (ref 70–110)
POCT GLUCOSE: 354 MG/DL (ref 70–110)
POCT GLUCOSE: 368 MG/DL (ref 70–110)
POCT GLUCOSE: 377 MG/DL (ref 70–110)
POCT GLUCOSE: 403 MG/DL (ref 70–110)
POCT GLUCOSE: 426 MG/DL (ref 70–110)
POCT GLUCOSE: 428 MG/DL (ref 70–110)
POCT GLUCOSE: 431 MG/DL (ref 70–110)
POCT GLUCOSE: 434 MG/DL (ref 70–110)
POCT GLUCOSE: 51 MG/DL (ref 70–110)
POCT GLUCOSE: 58 MG/DL (ref 70–110)
POCT GLUCOSE: 60 MG/DL (ref 70–110)
POCT GLUCOSE: 61 MG/DL (ref 70–110)
POCT GLUCOSE: 64 MG/DL (ref 70–110)
POCT GLUCOSE: 65 MG/DL (ref 70–110)
POCT GLUCOSE: 66 MG/DL (ref 70–110)
POCT GLUCOSE: 68 MG/DL (ref 70–110)
POCT GLUCOSE: 75 MG/DL (ref 70–110)
POCT GLUCOSE: 76 MG/DL (ref 70–110)
POCT GLUCOSE: 80 MG/DL (ref 70–110)
POCT GLUCOSE: 84 MG/DL (ref 70–110)
POCT GLUCOSE: 88 MG/DL (ref 70–110)
POCT GLUCOSE: 93 MG/DL (ref 70–110)
POCT GLUCOSE: 94 MG/DL (ref 70–110)
POCT GLUCOSE: 94 MG/DL (ref 70–110)
POCT GLUCOSE: >500 MG/DL (ref 70–110)
POIKILOCYTOSIS BLD QL SMEAR: SLIGHT
POIKILOCYTOSIS BLD QL SMEAR: SLIGHT
POLYCHROMASIA BLD QL SMEAR: ABNORMAL
POLYCHROMASIA BLD QL SMEAR: ABNORMAL
POTASSIUM BLD-SCNC: 2.9 MMOL/L (ref 3.5–5.1)
POTASSIUM SERPL-SCNC: 3.3 MMOL/L
POTASSIUM SERPL-SCNC: 3.5 MMOL/L
POTASSIUM SERPL-SCNC: 3.6 MMOL/L
POTASSIUM SERPL-SCNC: 3.6 MMOL/L
POTASSIUM SERPL-SCNC: 3.9 MMOL/L
POTASSIUM SERPL-SCNC: 4.1 MMOL/L
POTASSIUM SERPL-SCNC: 4.1 MMOL/L
PREALB SERPL-MCNC: 3 MG/DL
PROT SERPL-MCNC: 3.4 G/DL
PROT SERPL-MCNC: 3.4 G/DL
PROT SERPL-MCNC: 3.5 G/DL
PROT SERPL-MCNC: 3.6 G/DL
PROT SERPL-MCNC: 3.8 G/DL
RBC # BLD AUTO: 2.87 M/UL
RBC # BLD AUTO: 3.1 M/UL
SAMPLE: ABNORMAL
SAMPLE: ABNORMAL
SAMPLE: NORMAL
SCHISTOCYTES BLD QL SMEAR: ABNORMAL
SCHISTOCYTES BLD QL SMEAR: ABNORMAL
SITE: ABNORMAL
SITE: NORMAL
SODIUM BLD-SCNC: 138 MMOL/L (ref 136–145)
SODIUM SERPL-SCNC: 135 MMOL/L
SODIUM SERPL-SCNC: 136 MMOL/L
SODIUM SERPL-SCNC: 136 MMOL/L
SODIUM SERPL-SCNC: 137 MMOL/L
SODIUM SERPL-SCNC: 139 MMOL/L
SP02: 100
SP02: 99
TARGETS BLD QL SMEAR: ABNORMAL
TARGETS BLD QL SMEAR: ABNORMAL
THEOPHYLLINE SERPL-MCNC: <2 UG/ML
VANCOMYCIN SERPL-MCNC: 25.4 UG/ML
VT: 350
VT: 350
WBC # BLD AUTO: 13.25 K/UL
WBC # BLD AUTO: 8.81 K/UL

## 2019-01-28 PROCEDURE — 99900026 HC AIRWAY MAINTENANCE (STAT)

## 2019-01-28 PROCEDURE — 99223 PR INITIAL HOSPITAL CARE,LEVL III: ICD-10-PCS | Mod: ,,, | Performed by: INTERNAL MEDICINE

## 2019-01-28 PROCEDURE — 25000003 PHARM REV CODE 250: Performed by: SURGERY

## 2019-01-28 PROCEDURE — 36592 COLLECT BLOOD FROM PICC: CPT

## 2019-01-28 PROCEDURE — 84100 ASSAY OF PHOSPHORUS: CPT

## 2019-01-28 PROCEDURE — 25000003 PHARM REV CODE 250: Performed by: PHYSICIAN ASSISTANT

## 2019-01-28 PROCEDURE — 84100 ASSAY OF PHOSPHORUS: CPT | Mod: 91

## 2019-01-28 PROCEDURE — 82803 BLOOD GASES ANY COMBINATION: CPT

## 2019-01-28 PROCEDURE — 99233 PR SUBSEQUENT HOSPITAL CARE,LEVL III: ICD-10-PCS | Mod: ,,, | Performed by: ANESTHESIOLOGY

## 2019-01-28 PROCEDURE — 82330 ASSAY OF CALCIUM: CPT | Mod: 91

## 2019-01-28 PROCEDURE — 85027 COMPLETE CBC AUTOMATED: CPT

## 2019-01-28 PROCEDURE — 82330 ASSAY OF CALCIUM: CPT

## 2019-01-28 PROCEDURE — 99900017 HC EXTUBATION W/PARAMETERS (STAT)

## 2019-01-28 PROCEDURE — 99233 SBSQ HOSP IP/OBS HIGH 50: CPT | Mod: ,,, | Performed by: ANESTHESIOLOGY

## 2019-01-28 PROCEDURE — 99900035 HC TECH TIME PER 15 MIN (STAT)

## 2019-01-28 PROCEDURE — 63600175 PHARM REV CODE 636 W HCPCS: Performed by: STUDENT IN AN ORGANIZED HEALTH CARE EDUCATION/TRAINING PROGRAM

## 2019-01-28 PROCEDURE — 25000242 PHARM REV CODE 250 ALT 637 W/ HCPCS: Performed by: SURGERY

## 2019-01-28 PROCEDURE — 84132 ASSAY OF SERUM POTASSIUM: CPT

## 2019-01-28 PROCEDURE — 83735 ASSAY OF MAGNESIUM: CPT

## 2019-01-28 PROCEDURE — 83735 ASSAY OF MAGNESIUM: CPT | Mod: 91

## 2019-01-28 PROCEDURE — 99223 1ST HOSP IP/OBS HIGH 75: CPT | Mod: ,,, | Performed by: INTERNAL MEDICINE

## 2019-01-28 PROCEDURE — 83605 ASSAY OF LACTIC ACID: CPT | Mod: 91

## 2019-01-28 PROCEDURE — C9113 INJ PANTOPRAZOLE SODIUM, VIA: HCPCS | Performed by: PHYSICIAN ASSISTANT

## 2019-01-28 PROCEDURE — 80053 COMPREHEN METABOLIC PANEL: CPT | Mod: 91

## 2019-01-28 PROCEDURE — 63600175 PHARM REV CODE 636 W HCPCS: Performed by: SURGERY

## 2019-01-28 PROCEDURE — 25000003 PHARM REV CODE 250: Performed by: STUDENT IN AN ORGANIZED HEALTH CARE EDUCATION/TRAINING PROGRAM

## 2019-01-28 PROCEDURE — 20000000 HC ICU ROOM

## 2019-01-28 PROCEDURE — 80053 COMPREHEN METABOLIC PANEL: CPT

## 2019-01-28 PROCEDURE — 86901 BLOOD TYPING SEROLOGIC RH(D): CPT

## 2019-01-28 PROCEDURE — 94640 AIRWAY INHALATION TREATMENT: CPT

## 2019-01-28 PROCEDURE — 63600175 PHARM REV CODE 636 W HCPCS: Performed by: PHYSICIAN ASSISTANT

## 2019-01-28 PROCEDURE — 94761 N-INVAS EAR/PLS OXIMETRY MLT: CPT

## 2019-01-28 PROCEDURE — 85025 COMPLETE CBC W/AUTO DIFF WBC: CPT

## 2019-01-28 PROCEDURE — 86022 PLATELET ANTIBODIES: CPT

## 2019-01-28 PROCEDURE — 85007 BL SMEAR W/DIFF WBC COUNT: CPT

## 2019-01-28 PROCEDURE — 80202 ASSAY OF VANCOMYCIN: CPT

## 2019-01-28 PROCEDURE — 99223 1ST HOSP IP/OBS HIGH 75: CPT | Mod: ,,, | Performed by: NURSE PRACTITIONER

## 2019-01-28 PROCEDURE — 99232 PR SUBSEQUENT HOSPITAL CARE,LEVL II: ICD-10-PCS | Mod: ,,, | Performed by: INTERNAL MEDICINE

## 2019-01-28 PROCEDURE — 99232 SBSQ HOSP IP/OBS MODERATE 35: CPT | Mod: ,,, | Performed by: INTERNAL MEDICINE

## 2019-01-28 PROCEDURE — 94003 VENT MGMT INPAT SUBQ DAY: CPT

## 2019-01-28 PROCEDURE — 27000221 HC OXYGEN, UP TO 24 HOURS

## 2019-01-28 PROCEDURE — 86920 COMPATIBILITY TEST SPIN: CPT

## 2019-01-28 PROCEDURE — 94668 MNPJ CHEST WALL SBSQ: CPT

## 2019-01-28 PROCEDURE — 99223 PR INITIAL HOSPITAL CARE,LEVL III: ICD-10-PCS | Mod: ,,, | Performed by: NURSE PRACTITIONER

## 2019-01-28 PROCEDURE — 37799 UNLISTED PX VASCULAR SURGERY: CPT

## 2019-01-28 PROCEDURE — 84134 ASSAY OF PREALBUMIN: CPT

## 2019-01-28 PROCEDURE — 80198 ASSAY OF THEOPHYLLINE: CPT

## 2019-01-28 RX ORDER — HYDROMORPHONE HYDROCHLORIDE 1 MG/ML
0.5 INJECTION, SOLUTION INTRAMUSCULAR; INTRAVENOUS; SUBCUTANEOUS
Status: DISCONTINUED | OUTPATIENT
Start: 2019-01-28 | End: 2019-02-09

## 2019-01-28 RX ORDER — DIPHENHYDRAMINE HYDROCHLORIDE 50 MG/ML
12.5 INJECTION INTRAMUSCULAR; INTRAVENOUS ONCE
Status: COMPLETED | OUTPATIENT
Start: 2019-01-28 | End: 2019-01-28

## 2019-01-28 RX ORDER — DIPHENHYDRAMINE HYDROCHLORIDE 50 MG/ML
12.5 INJECTION INTRAMUSCULAR; INTRAVENOUS EVERY 6 HOURS PRN
Status: DISCONTINUED | OUTPATIENT
Start: 2019-01-28 | End: 2019-03-08

## 2019-01-28 RX ORDER — DEXTROSE MONOHYDRATE 100 MG/ML
INJECTION, SOLUTION INTRAVENOUS CONTINUOUS
Status: ACTIVE | OUTPATIENT
Start: 2019-01-28 | End: 2019-01-29

## 2019-01-28 RX ORDER — VANCOMYCIN HCL IN 5 % DEXTROSE 1G/250ML
1000 PLASTIC BAG, INJECTION (ML) INTRAVENOUS
Status: DISCONTINUED | OUTPATIENT
Start: 2019-01-29 | End: 2019-01-30

## 2019-01-28 RX ORDER — POTASSIUM CHLORIDE 29.8 MG/ML
40 INJECTION INTRAVENOUS
Status: DISCONTINUED | OUTPATIENT
Start: 2019-01-28 | End: 2019-01-28

## 2019-01-28 RX ORDER — MAGNESIUM SULFATE HEPTAHYDRATE 40 MG/ML
2 INJECTION, SOLUTION INTRAVENOUS ONCE
Status: COMPLETED | OUTPATIENT
Start: 2019-01-28 | End: 2019-01-29

## 2019-01-28 RX ORDER — SODIUM,POTASSIUM PHOSPHATES 280-250MG
2 POWDER IN PACKET (EA) ORAL ONCE
Status: COMPLETED | OUTPATIENT
Start: 2019-01-28 | End: 2019-01-28

## 2019-01-28 RX ORDER — CHLORHEXIDINE GLUCONATE ORAL RINSE 1.2 MG/ML
15 SOLUTION DENTAL 2 TIMES DAILY
Status: DISCONTINUED | OUTPATIENT
Start: 2019-01-28 | End: 2019-01-28

## 2019-01-28 RX ORDER — CLOPIDOGREL BISULFATE 75 MG/1
75 TABLET ORAL DAILY
Status: DISCONTINUED | OUTPATIENT
Start: 2019-01-29 | End: 2019-01-29

## 2019-01-28 RX ORDER — FLUTICASONE FUROATE AND VILANTEROL 100; 25 UG/1; UG/1
1 POWDER RESPIRATORY (INHALATION) DAILY
Status: DISCONTINUED | OUTPATIENT
Start: 2019-01-29 | End: 2019-02-27

## 2019-01-28 RX ORDER — POTASSIUM CHLORIDE 29.8 MG/ML
40 INJECTION INTRAVENOUS ONCE
Status: COMPLETED | OUTPATIENT
Start: 2019-01-28 | End: 2019-01-28

## 2019-01-28 RX ORDER — FENTANYL CITRATE 50 UG/ML
25 INJECTION, SOLUTION INTRAMUSCULAR; INTRAVENOUS
Status: DISCONTINUED | OUTPATIENT
Start: 2019-01-28 | End: 2019-01-28

## 2019-01-28 RX ADMIN — POTASSIUM CHLORIDE 40 MEQ: 29.8 INJECTION, SOLUTION INTRAVENOUS at 09:01

## 2019-01-28 RX ADMIN — ASPIRIN 81 MG CHEWABLE TABLET 81 MG: 81 TABLET CHEWABLE at 09:01

## 2019-01-28 RX ADMIN — HYDROCORTISONE SODIUM SUCCINATE 50 MG: 100 INJECTION, POWDER, FOR SOLUTION INTRAMUSCULAR; INTRAVENOUS at 09:01

## 2019-01-28 RX ADMIN — SODIUM CHLORIDE 94 UNITS/HR: 9 INJECTION, SOLUTION INTRAVENOUS at 11:01

## 2019-01-28 RX ADMIN — SODIUM CHLORIDE 94 UNITS/HR: 9 INJECTION, SOLUTION INTRAVENOUS at 06:01

## 2019-01-28 RX ADMIN — SODIUM CHLORIDE 104 UNITS/HR: 9 INJECTION, SOLUTION INTRAVENOUS at 09:01

## 2019-01-28 RX ADMIN — SODIUM CHLORIDE 94 UNITS/HR: 9 INJECTION, SOLUTION INTRAVENOUS at 07:01

## 2019-01-28 RX ADMIN — IPRATROPIUM BROMIDE AND ALBUTEROL SULFATE 3 ML: .5; 3 SOLUTION RESPIRATORY (INHALATION) at 01:01

## 2019-01-28 RX ADMIN — VANCOMYCIN HYDROCHLORIDE 1000 MG: 1 INJECTION, POWDER, FOR SOLUTION INTRAVENOUS at 10:01

## 2019-01-28 RX ADMIN — SODIUM CHLORIDE, SODIUM LACTATE, POTASSIUM CHLORIDE, AND CALCIUM CHLORIDE 1000 ML: .6; .31; .03; .02 INJECTION, SOLUTION INTRAVENOUS at 04:01

## 2019-01-28 RX ADMIN — FENTANYL CITRATE 25 MCG: 50 INJECTION INTRAMUSCULAR; INTRAVENOUS at 12:01

## 2019-01-28 RX ADMIN — PANTOPRAZOLE SODIUM 40 MG: 40 INJECTION, POWDER, LYOPHILIZED, FOR SOLUTION INTRAVENOUS at 09:01

## 2019-01-28 RX ADMIN — DIPHENHYDRAMINE HYDROCHLORIDE 12.5 MG: 50 INJECTION, SOLUTION INTRAMUSCULAR; INTRAVENOUS at 07:01

## 2019-01-28 RX ADMIN — MAGNESIUM SULFATE IN WATER 2 G: 40 INJECTION, SOLUTION INTRAVENOUS at 10:01

## 2019-01-28 RX ADMIN — IPRATROPIUM BROMIDE AND ALBUTEROL SULFATE 3 ML: .5; 3 SOLUTION RESPIRATORY (INHALATION) at 12:01

## 2019-01-28 RX ADMIN — HYDROCORTISONE SODIUM SUCCINATE 100 MG: 100 INJECTION, POWDER, FOR SOLUTION INTRAMUSCULAR; INTRAVENOUS at 05:01

## 2019-01-28 RX ADMIN — DEXTROSE: 10 SOLUTION INTRAVENOUS at 05:01

## 2019-01-28 RX ADMIN — SODIUM CHLORIDE 84 UNITS/HR: 9 INJECTION, SOLUTION INTRAVENOUS at 01:01

## 2019-01-28 RX ADMIN — PIPERACILLIN AND TAZOBACTAM 4.5 G: 4; .5 INJECTION, POWDER, LYOPHILIZED, FOR SOLUTION INTRAVENOUS; PARENTERAL at 12:01

## 2019-01-28 RX ADMIN — Medication 100 MCG/HR: at 01:01

## 2019-01-28 RX ADMIN — DEXTROSE MONOHYDRATE 12.5 G: 500 INJECTION PARENTERAL at 04:01

## 2019-01-28 RX ADMIN — DEXTROSE MONOHYDRATE 12.5 G: 25 INJECTION, SOLUTION INTRAVENOUS at 11:01

## 2019-01-28 RX ADMIN — SODIUM CHLORIDE 94 UNITS/HR: 9 INJECTION, SOLUTION INTRAVENOUS at 05:01

## 2019-01-28 RX ADMIN — DEXTROSE MONOHYDRATE 12.5 G: 500 INJECTION PARENTERAL at 05:01

## 2019-01-28 RX ADMIN — DIBASIC SODIUM PHOSPHATE, MONOBASIC POTASSIUM PHOSPHATE AND MONOBASIC SODIUM PHOSPHATE 2 TABLET: 852; 155; 130 TABLET ORAL at 10:01

## 2019-01-28 RX ADMIN — HYDROMORPHONE HYDROCHLORIDE 0.5 MG: 1 INJECTION, SOLUTION INTRAMUSCULAR; INTRAVENOUS; SUBCUTANEOUS at 05:01

## 2019-01-28 RX ADMIN — HYDROCORTISONE SODIUM SUCCINATE 50 MG: 100 INJECTION, POWDER, FOR SOLUTION INTRAMUSCULAR; INTRAVENOUS at 02:01

## 2019-01-28 RX ADMIN — SODIUM CHLORIDE 94 UNITS/HR: 9 INJECTION, SOLUTION INTRAVENOUS at 10:01

## 2019-01-28 RX ADMIN — SODIUM CHLORIDE, SODIUM LACTATE, POTASSIUM CHLORIDE, AND CALCIUM CHLORIDE 1000 ML: .6; .31; .03; .02 INJECTION, SOLUTION INTRAVENOUS at 05:01

## 2019-01-28 RX ADMIN — SODIUM CHLORIDE 84 UNITS/HR: 9 INJECTION, SOLUTION INTRAVENOUS at 03:01

## 2019-01-28 RX ADMIN — SODIUM CHLORIDE 6 UNITS/HR: 9 INJECTION, SOLUTION INTRAVENOUS at 12:01

## 2019-01-28 RX ADMIN — DEXTROSE: 10 SOLUTION INTRAVENOUS at 10:01

## 2019-01-28 RX ADMIN — DEXTROSE MONOHYDRATE 12.5 G: 500 INJECTION PARENTERAL at 02:01

## 2019-01-28 RX ADMIN — VASOPRESSIN 0.04 UNITS/MIN: 20 INJECTION INTRAVENOUS at 04:01

## 2019-01-28 RX ADMIN — PIPERACILLIN AND TAZOBACTAM 4.5 G: 4; .5 INJECTION, POWDER, LYOPHILIZED, FOR SOLUTION INTRAVENOUS; PARENTERAL at 08:01

## 2019-01-28 RX ADMIN — DEXTROSE MONOHYDRATE 12.5 G: 25 INJECTION, SOLUTION INTRAVENOUS at 09:01

## 2019-01-28 RX ADMIN — Medication 75 MG: at 09:01

## 2019-01-28 RX ADMIN — PIPERACILLIN AND TAZOBACTAM 4.5 G: 4; .5 INJECTION, POWDER, LYOPHILIZED, FOR SOLUTION INTRAVENOUS; PARENTERAL at 03:01

## 2019-01-28 RX ADMIN — Medication 50 MG: at 09:01

## 2019-01-28 RX ADMIN — IPRATROPIUM BROMIDE AND ALBUTEROL SULFATE 3 ML: .5; 3 SOLUTION RESPIRATORY (INHALATION) at 07:01

## 2019-01-28 RX ADMIN — IPRATROPIUM BROMIDE AND ALBUTEROL SULFATE 3 ML: .5; 3 SOLUTION RESPIRATORY (INHALATION) at 08:01

## 2019-01-28 RX ADMIN — POTASSIUM & SODIUM PHOSPHATES POWDER PACK 280-160-250 MG 2 PACKET: 280-160-250 PACK at 06:01

## 2019-01-28 RX ADMIN — POTASSIUM CHLORIDE 40 MEQ: 29.8 INJECTION, SOLUTION INTRAVENOUS at 03:01

## 2019-01-28 RX ADMIN — PANTOPRAZOLE SODIUM 40 MG: 40 INJECTION, POWDER, LYOPHILIZED, FOR SOLUTION INTRAVENOUS at 08:01

## 2019-01-28 RX ADMIN — DEXTROSE MONOHYDRATE 12.5 G: 25 INJECTION, SOLUTION INTRAVENOUS at 08:01

## 2019-01-28 NOTE — SUBJECTIVE & OBJECTIVE
Past Medical History:   Diagnosis Date    Abnormal LFTs 12/14/2018    Asthma     Closed compression fracture of fourth lumbar vertebra     COPD (chronic obstructive pulmonary disease)     Coronary artery disease     Diabetes mellitus     Fall 10/4/2018    Fracture     right tib & fib    Glaucoma     High cholesterol     Hypertension     Infected incision 9/20/2018    Intractable nausea and vomiting 1/23/2019    Iritis     Pulmonary embolus     S/P appendectomy 9/11/2018    Stroke     rt sided weakness.       Past Surgical History:   Procedure Laterality Date    ABDOMINAL SURGERY      APPENDECTOMY N/A 8/26/2018    Performed by Bernadine Melendrez MD at [facility] OR    APPENDECTOMY, LAPAROSCOPIC---CONVERTED TO OPEN APPENDECTOMY @0950 N/A 8/26/2018    Performed by [facility]ine Melendrez [facility] at [facility] OR    APPLICATION, WOUND VAC N/A 1/25/2019    Performed by Monster Laurent MD at [facility] OR [address] [place] [address]    BACK SURGERY      stimulator    CATARACT EXTRACTION      EXCISION, ABSCESS- drainage abdominal wall abscess N/A 1/25/2019    Performed by Monster Laurent MD at [facility] OR [address] [place] [address]    HYSTERECTOMY      INCISION AND DRAINAGE, ABSCESS-  drainage of pelvic abscess N/A 1/25/2019    Performed by Monster Laurent MD at [facility] OR [address] [place] [address]    LAPAROTOMY, EXPLORATORY N/A 1/25/2019    Performed by Monster Laurent MD at [facility] OR [address] [place] [address]    TOTAL HIP ARTHROPLASTY Left     2008       Review of patient's allergies indicates:   Allergen Reactions    Ace inhibitors Swelling    Carvedilol      Other reaction(s): Other (See Comments)  blister    Hydralazine analogues     Metformin Nausea And Vomiting    Tetracycline Itching    Tetracyclines Swelling    Travatan (with benzalkonium) [travoprost (benzalkonium)]     Travoprost Itching     Other reaction(s): Other (See Comments)  Blurry vision       Medications:  Medications Prior to Admission   Medication Sig    acetaminophen (TYLENOL) 500 MG tablet Take  1,000 mg by mouth 2 (two) times daily as needed for Pain.    albuterol (PROVENTIL/VENTOLIN HFA) 90 mcg/actuation inhaler Inhale 2 puffs into the lungs every 6 (six) hours as needed for Wheezing or Shortness of Breath. Rescue    albuterol-ipratropium (DUO-NEB) 2.5 mg-0.5 mg/3 mL nebulizer solution Take 3 mLs by nebulization every 4 (four) hours as needed for Wheezing or Shortness of Breath. Rescue     aspirin (ECOTRIN) 81 MG EC tablet Take 1 tablet (81 mg total) by mouth once daily.    baclofen (LIORESAL) 10 MG tablet Take 10 mg by mouth 2 (two) times daily as needed (MUSCLE SPASMS).    cefdinir (OMNICEF) 300 MG capsule TK ONE C PO  BID FOR 7 DAYS    cephALEXin (KEFLEX) 750 MG capsule TK ONE C PO TID FOR 5 DAYS    ciprofloxacin HCl (CIPRO) 250 MG tablet Take 1 tablet (250 mg total) by mouth every 12 (twelve) hours. Starting 12/15 evening    clopidogrel (PLAVIX) 75 mg tablet Take 75 mg by mouth once daily.    diclofenac sodium (VOLTAREN) 1 % Gel Apply 2 g topically daily as needed (PAIN).    diltiaZEM (CARDIZEM CD) 180 MG 24 hr capsule Take 180 mg by mouth once daily.    DULoxetine (CYMBALTA) 60 MG capsule Take 60 mg by mouth once daily.    fluticasone-vilanterol (BREO ELLIPTA) 100-25 mcg/dose diskus inhaler Inhale 1 puff into the lungs once daily. Controller    gabapentin (NEURONTIN) 300 MG capsule Take 300 mg by mouth once daily.    Lactobacillus rhamnosus-fiber 2.5 billion cell-3.5 gram PwPk Take 1 capsule by mouth.    lansoprazole (PREVACID) 30 MG capsule Take 30 mg by mouth once daily.    losartan (COZAAR) 100 MG tablet Take 100 mg by mouth once daily.     ondansetron (ZOFRAN) 4 MG tablet Take 1 tablet (4 mg total) by mouth every 6 (six) hours as needed.    ondansetron (ZOFRAN) 4 MG tablet Take 4-8 mg by mouth.    potassium chloride SA (K-DUR,KLOR-CON) 10 MEQ tablet Take 10 mEq by mouth.    predniSONE (DELTASONE) 10 MG tablet Take 4 tablets (40 mg) on 12/16, then take 1 tablet (10 mg) daily     pyridoxine, vitamin B6, (VITAMIN B-6) 50 MG Tab Take 1 tablet (50 mg total) by mouth once daily.    simvastatin (ZOCOR) 40 MG tablet Take 40 mg by mouth.    theophylline (THEODUR) 300 mg 24 hr capsule Take 300 mg by mouth once daily.    traMADol (ULTRAM) 50 mg tablet TK 1 T PO BID PRN    [DISCONTINUED] baclofen (LIORESAL) 10 MG tablet Take 10 mg by mouth.    [DISCONTINUED] furosemide (LASIX) 40 MG tablet Take 40 mg by mouth once daily.     [DISCONTINUED] HYDROcodone-acetaminophen (NORCO) 5-325 mg per tablet Take 1 tablet by mouth every 4 to 6 hours as needed.    [DISCONTINUED] prazosin (MINIPRESS) 5 MG capsule TK 1 C PO BID     Antibiotics (From admission, onward)    Start     Stop Route Frequency Ordered    01/29/19 1030  vancomycin in dextrose 5 % 1 gram/250 mL IVPB 1,000 mg  (Vancomycin IVPB with levels panel)      -- IV Every 24 hours (non-standard times) 01/28/19 1426    01/24/19 1015  piperacillin-tazobactam 4.5 g in sodium chloride 0.9% 100 mL IVPB (ready to mix system)      -- IV Every 8 hours (non-standard times) 01/24/19 1013        Antifungals (From admission, onward)    None        Antivirals (From admission, onward)    None           Immunization History   Administered Date(s) Administered    PPD Test 08/28/2018, 09/14/2018       Family History     Problem Relation (Age of Onset)    Cancer Father    Diabetes Brother    Lupus Sister    Multiple sclerosis Mother        Social History     Socioeconomic History    Marital status:      Spouse name: None    Number of children: None    Years of education: None    Highest education level: None   Social Needs    Financial resource strain: None    Food insecurity - worry: None    Food insecurity - inability: None    Transportation needs - medical: None    Transportation needs - non-medical: None   Occupational History    None   Tobacco Use    Smoking status: Never Smoker    Smokeless tobacco: Never Used   Substance and Sexual  Activity    Alcohol use: No     Frequency: Never    Drug use: No    Sexual activity: No     Partners: Male   Other Topics Concern    None   Social History Narrative    None     Review of Systems   Unable to perform ROS: Intubated     Objective:     Vital Signs (Most Recent):  Temp: 98.7 °F (37.1 °C) (01/28/19 1345)  Pulse: (!) 119 (01/28/19 1430)  Resp: 20 (01/28/19 1430)  BP: 103/67 (01/28/19 1430)  SpO2: 99 % (01/28/19 1430) Vital Signs (24h Range):  Temp:  [97.8 °F (36.6 °C)-98.7 °F (37.1 °C)] 98.7 °F (37.1 °C)  Pulse:  [] 119  Resp:  [11-26] 20  SpO2:  [94 %-100 %] 99 %  BP: ()/(54-90) 103/67  Arterial Line BP: ()/() 91/75     Weight: 71.2 kg (157 lb)  Body mass index is 29.66 kg/m².    Estimated Creatinine Clearance: 86.7 mL/min (based on SCr of 0.6 mg/dL).    Physical Exam   Constitutional: She appears well-developed. She is sedated and intubated.   HENT:   Head: Normocephalic and atraumatic.   Eyes: Conjunctivae are normal. Right eye exhibits no discharge. Left eye exhibits no discharge.   Neck: Neck supple.   Cardiovascular: Normal rate and regular rhythm. Exam reveals no gallop and no friction rub.   No murmur heard.  Pulmonary/Chest: Breath sounds normal. She is intubated. She has no wheezes. She has no rales.   Abdominal:   Wound vac to RLQ   Musculoskeletal: She exhibits edema. She exhibits no deformity.   Skin: Skin is warm. She is not diaphoretic.       Significant Labs:   Blood Culture:   Recent Labs   Lab 12/13/18  0537 01/24/19  0833 01/24/19  0839 01/25/19  1752 01/26/19  0109   LABBLOO No growth after 5 days. Gram stain aer bottle: Gram positive cocci in clusters resembling Staph   Results called to and read back by: Micheline Martin RN 01/25/2019    03:43  METHICILLIN RESISTANT STAPHYLOCOCCUS AUREUS  ID consult required at [facility],[place],[facility] and [facility]   locations.   Gram stain aer bottle: Gram positive cocci in clusters resembling Staph   Results  called to and read back by: Micheline Montgomery RN 01/25/2019    02:39  METHICILLIN RESISTANT STAPHYLOCOCCUS AUREUS  ID consult required at [facility],[place],[facility] and [facility].  For susceptibility see order # [id number]   No Growth to date  No Growth to date  No Growth to date No Growth to date  No Growth to date  No Growth to date     BMP:   Recent Labs   Lab 01/28/19  0805   *   *   K 3.6      CO2 23   BUN 6*   CREATININE 0.6   CALCIUM 7.6*   MG 1.9     CBC:   Recent Labs   Lab 01/27/19  0330 01/28/19 0315   WBC 7.62 8.81   HGB 9.3* 8.1*   HCT 26.5* 22.3*   PLT 59* 42*     CMP:   Recent Labs   Lab 01/28/19  0015  01/28/19  0315 01/28/19  0530 01/28/19  0805     --  136  --  135*   K 3.6   < > 3.3* 3.9 3.6     --  104  --  106   CO2 25  --  24  --  23   *  --  375*  --  232*   BUN 5*  --  5*  --  6*   CREATININE 0.6  --  0.6  --  0.6   CALCIUM 7.3*  --  7.9*  --  7.6*   PROT 3.6*  --  3.8*  --  3.4*   ALBUMIN 1.6*  --  1.7*  --  1.6*   BILITOT 2.4*  --  2.7*  --  2.8*   ALKPHOS 263*  --  307*  --  342*   AST 48*  --  60*  --  70*   ALT 13  --  16  --  21   ANIONGAP 8  --  8  --  6*   EGFRNONAA >60.0  --  >60.0  --  >60.0    < > = values in this interval not displayed.     Microbiology Results (last 7 days)     Procedure Component Value Units Date/Time    AFB Culture & Smear [[id number]] Collected:  01/25/19 1000    Order Status:  Completed Specimen:  Abscess from Abdomen Updated:  01/28/19 1526     AFB Culture & Smear Culture in progress     AFB CULTURE STAIN No acid fast bacilli seen.    Narrative:       Pelvic abscess    AFB Culture & Smear [[id number]] Collected:  01/25/19 1002    Order Status:  Completed Specimen:  Abscess from Abdomen Updated:  01/28/19 1526     AFB Culture & Smear Culture in progress     AFB CULTURE STAIN No acid fast bacilli seen.    Narrative:       Abdominal wall abscess    Aerobic culture [[id number]] Collected:  01/25/19 1000     Order Status:  Completed Specimen:  Abscess from Abdomen Updated:  01/28/19 0927     Aerobic Bacterial Culture No growth    Narrative:       Pelvic abscess    Aerobic culture [[id number]] Collected:  01/25/19 1002    Order Status:  Completed Specimen:  Abscess from Abdomen Updated:  01/28/19 0927     Aerobic Bacterial Culture No growth    Narrative:       Abdominal wall abscess    Culture, Anaerobe [[id number]] Collected:  01/25/19 1000    Order Status:  Completed Specimen:  Abscess from Abdomen Updated:  01/28/19 0753     Anaerobic Culture Culture in progress    Narrative:       Pelvic abscess    Culture, Anaerobe [[id number]] Collected:  01/25/19 1002    Order Status:  Completed Specimen:  Abscess from Abdomen Updated:  01/28/19 0753     Anaerobic Culture Culture in progress    Narrative:       Abdominal wall abscess    Blood culture [[id number]] Collected:  01/26/19 0109    Order Status:  Completed Specimen:  Blood from Line, Jugular, Internal Left Updated:  01/28/19 0612     Blood Culture, Routine No Growth to date     Blood Culture, Routine No Growth to date     Blood Culture, Routine No Growth to date    Blood culture [[id number]] Collected:  01/25/19 1752    Order Status:  Completed Specimen:  Blood from Line, Arterial, Left Updated:  01/28/19 0612     Blood Culture, Routine No Growth to date     Blood Culture, Routine No Growth to date     Blood Culture, Routine No Growth to date    Blood culture [[id number]] Collected:  01/24/19 0839    Order Status:  Completed Specimen:  Blood Updated:  01/27/19 1113     Blood Culture, Routine Gram stain aer bottle: Gram positive cocci in clusters resembling Staph      Blood Culture, Routine Results called to and read back by: Micheline Montgomery RN 01/25/2019       Blood Culture, Routine 02:39     Blood Culture, Routine --     METHICILLIN RESISTANT STAPHYLOCOCCUS AUREUS  ID consult required at [facility] and [facility].  For susceptibility see order #  [id number]      Blood culture [[id number]]  (Susceptibility) Collected:  01/24/19 0833    Order Status:  Completed Specimen:  Blood Updated:  01/27/19 1113     Blood Culture, Routine Gram stain aer bottle: Gram positive cocci in clusters resembling Staph      Blood Culture, Routine Results called to and read back by: Micheline Martin RN 01/25/2019       Blood Culture, Routine 03:43     Blood Culture, Routine --     METHICILLIN RESISTANT STAPHYLOCOCCUS AUREUS  ID consult required at [facility],[place],[facility] and [facility].      Blood culture [[id number]] Collected:  01/25/19 1752    Order Status:  Sent Specimen:  Blood from Line, Arterial, Left Updated:  01/25/19 1753    Gram stain [[id number]] Collected:  01/25/19 1000    Order Status:  Completed Specimen:  Abscess from Abdomen Updated:  01/25/19 1231     Gram Stain Result Few WBC's      Rare Gram negative rods    Narrative:       Pelvic abscess    Gram stain [[id number]] Collected:  01/25/19 1002    Order Status:  Completed Specimen:  Abscess from Abdomen Updated:  01/25/19 1225     Gram Stain Result No WBC's      No organisms seen    Narrative:       Abdominal wall abscess    Fungus culture [[id number]] Collected:  01/25/19 1002    Order Status:  Sent Specimen:  Abscess from Abdomen Updated:  01/25/19 1132    Fungus culture [[id number]] Collected:  01/25/19 1000    Order Status:  Sent Specimen:  Abscess from Abdomen Updated:  01/25/19 1128        Wound Culture:   Recent Labs   Lab 08/26/18  1015 09/05/18  1246 09/10/18  1817 01/25/19  1000 01/25/19  1002   LABAERO ESCHERICHIA COLI  Moderate    ENTEROCOCCUS RAFFINOSUS  Moderate   METHICILLIN RESISTANT STAPHYLOCOCCUS AUREUS  Few    METHICILLIN RESISTANT STAPHYLOCOCCUS AUREUS  Moderate   METHICILLIN RESISTANT STAPHYLOCOCCUS AUREUS  Moderate  Skin david also present   No growth No growth     All pertinent labs within the past 24 hours have been reviewed.  Recent Lab Results  (Last 25 results in the past 24  hours)      01/28/19  1603   01/28/19  1524   01/28/19  1457   01/28/19  1431   01/28/19  1428        Immature Granulocytes               Immature Grans (Abs)               Albumin               Alkaline Phosphatase               Allens Test               ALT               Anion Gap               Aniso               AST               BANDS               Baso #               Basophil%               Total Bilirubin               Site               BUN, Bld               Epping Cells               Calcium               Calcium, Ion               Chloride               CO2               Creatinine               DelSys               Differential Method               eGFR if                eGFR if non                Eos #               Eosinophil%               FiO2               Fragmented Cells               Glucose               Gran%               Hematocrit               Hemoglobin               Hypo               Lactate, Dom               Lymph #               Lymph%               Magnesium               MCH               MCHC               MCV               Min Vol               Mode               Mono #               Mono%               MPV               nRBC               Pappenheimer Bodies               PEEP               Phosphorus               PiP               Platelets               POC BE               POC HCO3               POC PCO2               POC PH               POC PO2               POC SATURATED O2               POC TCO2               POCT Glucose 65 93 68 58 60     Poik               Poly               Potassium               Prealbumin               Total Protein               Rate               RBC               RDW               Sample               Sodium               Sp02               Target Cells               Theophylline Lvl               Vancomycin, Random               Vt               WBC                                01/28/19  1354   01/28/19  1246    01/28/19  1200   01/28/19  1156   01/28/19  1102        Immature Granulocytes               Immature Grans (Abs)               Albumin               Alkaline Phosphatase               Allens Test               ALT               Anion Gap               Aniso               AST               BANDS               Baso #               Basophil%               Total Bilirubin               Site               BUN, Bld               Cherie Cells               Calcium               Calcium, Ion               Chloride               CO2               Creatinine               DelSys               Differential Method               eGFR if                eGFR if non                Eos #               Eosinophil%               FiO2               Fragmented Cells               Glucose               Gran%               Hematocrit               Hemoglobin               Hypo               Lactate, Dom     4.0  Comment:  Falsely low lactic acid results can be found in samples   containing >=13.0 mg/dL total bilirubin and/or >=3.5 mg/dL   direct bilirubin.  *Critical value -   Results called to and read back by:TONY ROCA RN           Lymph #               Lymph%               Magnesium               MCH               MCHC               MCV               Min Vol               Mode               Mono #               Mono%               MPV               nRBC               Pappenheimer Bodies               PEEP               Phosphorus               PiP               Platelets               POC BE               POC HCO3               POC PCO2               POC PH               POC PO2               POC SATURATED O2               POC TCO2               POCT Glucose 75 109   144 153     Poik               Poly               Potassium               Prealbumin               Total Protein               Rate               RBC               RDW               Sample               Sodium               Sp02                Target Cells               Theophylline Lvl     <2.0  Comment:  Toxic: >20.0 ug/mL         Vancomycin, Random               Vt               WBC                                01/28/19  1001   01/28/19  0901   01/28/19  0816   01/28/19  0811   01/28/19  0805        Immature Granulocytes               Immature Grans (Abs)               Albumin         1.6     Alkaline Phosphatase         342     Allens Test     N/A         ALT         21     Anion Gap         6     Aniso               AST         70     BANDS               Baso #               Basophil%               Total Bilirubin         2.8  Comment:  For infants and newborns, interpretation of results should be based  on gestational age, weight and in agreement with clinical  observations.  Premature Infant recommended reference ranges:  Up to 24 hours.............<8.0 mg/dL  Up to 48 hours............<12.0 mg/dL  3-5 days..................<15.0 mg/dL  6-29 days.................<15.0 mg/dL       Site     Lio/UAC         BUN, Bld         6     [place]ton Cells               Calcium         7.6     Calcium, Ion         1.11     Chloride         106     CO2         23     Creatinine         0.6     DelSys     Adult Vent         Differential Method               eGFR if          >60.0     eGFR if non          >60.0  Comment:  Calculation used to obtain the estimated glomerular filtration  rate (eGFR) is the CKD-EPI equation.        Eos #               Eosinophil%               FiO2     40         Fragmented Cells               Glucose         232     Gran%               Hematocrit               Hemoglobin               Hypo               Lactate, Dom         3.2  Comment:  Falsely low lactic acid results can be found in samples   containing >=13.0 mg/dL total bilirubin and/or >=3.5 mg/dL   direct bilirubin.       Lymph #               Lymph%               Magnesium         1.9     MCH               MCHC               MCV                Min Vol     5         Mode     AC/PRVC         Mono #               Mono%               MPV               nRBC               Pappenheimer Bodies               PEEP     5         Phosphorus         1.7     PiP     22         Platelets               POC BE     0         POC HCO3     25.2         POC PCO2     43.8         POC PH     7.367         POC PO2     98         POC SATURATED O2     97         POC TCO2     26         POCT Glucose 166 204   229       Poik               Poly               Potassium         3.6     Prealbumin               Total Protein         3.4     Rate     15         RBC               RDW               Sample     ARTERIAL         Sodium         135     Sp02     99         Target Cells               Theophylline Lvl               Vancomycin, Random         25.4     Vt     350         WBC                                01/28/19  0711   01/28/19  0625   01/28/19  0532   01/28/19  0530   01/28/19  0448        Immature Granulocytes               Immature Grans (Abs)               Albumin               Alkaline Phosphatase               Allens Test               ALT               Anion Gap               Aniso               AST               BANDS               Baso #               Basophil%               Total Bilirubin               Site               BUN, Bld               Cherie Cells               Calcium               Calcium, Ion       1.09       Chloride               CO2               Creatinine               DelSys               Differential Method               eGFR if                eGFR if non                Eos #               Eosinophil%               FiO2               Fragmented Cells               Glucose               Gran%               Hematocrit               Hemoglobin               Hypo               Lactate, Dom               Lymph #               Lymph%               Magnesium               MCH               MCHC               MCV                Min Vol               Mode               Mono #               Mono%               MPV               nRBC               Pappenheimer Bodies               PEEP               Phosphorus               PiP               Platelets               POC BE               POC HCO3               POC PCO2               POC PH               POC PO2               POC SATURATED O2               POC TCO2               POCT Glucose 247 278 302   324     Poik               Poly               Potassium       3.9       Prealbumin               Total Protein               Rate               RBC               RDW               Sample               Sodium               Sp02               Target Cells               Theophylline Lvl               Vancomycin, Random               Vt               WBC                                01/28/19  0400   01/28/19  0315   01/28/19  0315   01/28/19  0233   01/28/19  0231        Immature Granulocytes     CANCELED  Comment:  Result canceled by the ancillary.         Immature Grans (Abs)     CANCELED  Comment:  Mild elevation in immature granulocytes is non specific and   can be seen in a variety of conditions including stress response,   acute inflammation, trauma and pregnancy. Correlation with other   laboratory and clinical findings is essential.    Result canceled by the ancillary.           Albumin     1.7         Alkaline Phosphatase     307         Allens Test               ALT     16         Anion Gap     8         Aniso     Slight         AST     60         BANDS     6.0         Baso #     CANCELED  Comment:  Result canceled by the ancillary.         Basophil%     0.0         Total Bilirubin     2.7  Comment:  For infants and newborns, interpretation of results should be based  on gestational age, weight and in agreement with clinical  observations.  Premature Infant recommended reference ranges:  Up to 24 hours.............<8.0 mg/dL  Up to 48 hours............<12.0 mg/dL  3-5  days..................<15.0 mg/dL  6-29 days.................<15.0 mg/dL           Site               BUN, Bld     5         Cherie Cells     Occasional         Calcium     7.9         Calcium, Ion               Chloride     104         CO2     24         Creatinine     0.6         DelSys               Differential Method     Manual         eGFR if      >60.0         eGFR if non      >60.0  Comment:  Calculation used to obtain the estimated glomerular filtration  rate (eGFR) is the CKD-EPI equation.            Eos #     CANCELED  Comment:  Result canceled by the ancillary.         Eosinophil%     0.0         FiO2               Fragmented Cells     Occasional         Glucose     375         Gran%     90.0         Hematocrit     22.3         Hemoglobin     8.1         Hypo     Occasional         Lactate, Dom     3.9  Comment:  Falsely low lactic acid results can be found in samples   containing >=13.0 mg/dL total bilirubin and/or >=3.5 mg/dL   direct bilirubin.  *Critical value -   Results called to and read back by:Gale Olivia   RN           Lymph #     CANCELED  Comment:  Result canceled by the ancillary.         Lymph%     4.0         Magnesium     2.3         MCH     28.2         MCHC     36.3         MCV     78         Min Vol               Mode               Mono #     CANCELED  Comment:  Result canceled by the ancillary.         Mono%     0.0         MPV     SEE COMMENT  Comment:  Result not available.         nRBC     0         Pappenheimer Bodies     Present         PEEP               Phosphorus     1.7         PiP               Platelets     42         POC BE               POC HCO3               POC PCO2               POC PH               POC PO2               POC SATURATED O2               POC TCO2               POCT Glucose 377 354   368 431     Poik     Slight         Poly     Occasional         Potassium     3.3         Prealbumin     3         Total Protein     3.8          Rate               RBC     2.87         RDW     18.6         Sample               Sodium     136         Sp02               Target Cells     Occasional         Theophylline Lvl               Vancomycin, Random               Vt               WBC     8.81                              Significant Imaging: I have reviewed all pertinent imaging results/findings within the past 24 hours.

## 2019-01-28 NOTE — HPI
Pt is a 64 y/o female with hx of asthma, COPD, CAD, DM, HTN, CVA 2012 with R side deficits, HFpEF, severe debility , lap converted to open appendectomy 8/2018 complicated by cdiff, failure to thrive and recurrent fluid collection presented to the ED with intractable n/v and abdominal pain.     Appendectomy complcated by sx site infxn with recurrent fluid collection.  Cxs- MRSA, Enterococcus, Ecoli and Anaerobes.   Abx treatment Vanc 9/11-10/1;  Pip-tazo 9/11-9/13, 9/23-10/1; Ertapenem 9/14-9/22.  No change in fluid collection.  Abx discontinued on 10/1. Lactic acid 6.8, with tachycardia and WBC of 13.  1/24 CT shows fluid collection in R hemipelvis.  Started on vanc and zosyn. Patient now s/p exploratory laparotomy, washout, and pelvic abscess drainage on 1/25/19. Gross purulence.  Blood cx positive for MRSA on 1/24.     Pt was treated with 2 weeks of IV vanc for MRSA bacteremia and then about 5 weeks of cipro/flagyl. On 3/8, pt with abdominal pain, fever, and elevated WBC count. Infectious work up initiated. CT revealed possible necrotizing pneumonia. Pt started on empiric vanc and zosyn (on day 7). Sputum cultures growing MRSA and acinetobacter. ID consulted for abx recs.     Patient's daughter at bedside. Pt sitting up in chair. Overall, feeling well.

## 2019-01-28 NOTE — HPI
Reason for Consult: Management of T2DM, Hyperglycemia     Surgical Procedure and Date:      LAPAROTOMY, EXPLORATORY (N/A)     INCISION AND DRAINAGE, ABSCESS-  drainage of pelvic abscess (N/A)     EXCISION, ABSCESS- drainage abdominal wall abscess (N/A)     APPLICATION, WOUND VAC (N/A)       Diabetes diagnosis year: 20 years ago    Home Diabetes Medications:  Reports being taken off of insulin prior to admission.      Levemir 20 units BID     Humalog 16 units TIDWM with correction     How often checking glucose at home? >4 x day   BG readings on regimen: 110's  Hypoglycemia on the regimen?  Yes - 40's  Missed doses on regimen?  No    Diabetes Complications include:     Hypoglycemia     Complicating diabetes co morbidities:   HLD, CVA, and CAD    Lab Results   Component Value Date    HGBA1C 4.7 01/24/2019       HPI:   Patient is a 63 y.o. female with a diagnosis of sepsis and lactic acidosis secondary to abdominal abscess. Also has diagnosis of COPD, asthma, CAD, CVA (2012), and DM2. Patient receives primary care for DM in [place]. Her primary care giver is her daughter. Patient's DM is well controlled by primary Endocrinologist in [place]. Patient has not been on insulin regimen for the past 3 weeks leading up to hospitalization. According to daughter, patient was not eating, and having low BG reading. Therefore, insulin regimen was stopped by patient's primary Endocrinologist. Endocrinology at [facility] consulted to manage DM/hyperglycemia.

## 2019-01-28 NOTE — PROGRESS NOTES
[facility]  Nephrology  Progress Note    Patient Name: Sheryl Martines  MRN: [id number]  Admission Date: 1/23/2019  Hospital Length of Stay: 4 days  Attending Provider: Monster Laurent MD   Primary Care Physician: Primary Doctor No  Principal Problem:Pelvic abscess in female    Subjective:     HPI: Ms. Sheryl Martines is a 62 yo AAF with HFpEF, HTN, COPD, DM, hx of CVA, and NIKHIL in the past who presented to the hospital on 1/23/19 with chief complaint of worsening nasuea/vomiting for several months.  Per history/chart review, patient underwent an appendectomy in summer of 2018, complicated by post-op nausea and vomiting with weight loss.  She had a CT scan in the ED which revealed an irregular shaped rim enhancing fluid collection measuring at least 4.0 x 3.0 cm in the right hemipelvis at the site of prior appendectomy. Surgery was consulted and she underwent Exp/Lap with abscess drainage.  She was transferred to PACU post-op for recovery and noted to be hypotensive and SOB (SBP 70's).  She was volume resuscitated with 7 L of crystalloids.  Labs revealed a lactate, peaked at 10.1 and she was started on levo/vaso for BP support, later intubated for respiratory failure.  UOP averaging around 20-30 cc/hr on morning of consultation, but slowly trended down and now anuric x 3 hours.  ABG revealing a compensated metabolic acidosis with pH of 7.26 (bicarb of 10) and Nephrology has been consulted for evaluation for RRT.        Interval History: SLED stopped yesterday. Patient net positive 5.5 L/24h. UOP in the past 24 hours increased to 990 ml. On Levo and Vaso for BP support.  Patient extubated today. Alert and following direct commands.   Acidosis resolved with RRT  sCr 0.6  CVP 7    Review of patient's allergies indicates:   Allergen Reactions    Ace inhibitors Swelling    Carvedilol      Other reaction(s): Other (See Comments)  blister    Hydralazine analogues     Metformin Nausea And  Vomiting    Tetracycline Itching    Tetracyclines Swelling    Travatan (with benzalkonium) [travoprost (benzalkonium)]     Travoprost Itching     Other reaction(s): Other (See Comments)  Blurry vision     Current Facility-Administered Medications   Medication Frequency    0.9%  NaCl infusion (for blood administration) Q24H PRN    albuterol sulfate nebulizer solution 2.5 mg Q4H PRN    albuterol-ipratropium 2.5 mg-0.5 mg/3 mL nebulizer solution 3 mL Q6H    aspirin chewable tablet 81 mg Daily    [START ON 1/29/2019] clopidogrel tablet 75 mg Daily    dexmedetomidine (PRECEDEX) 400mcg/100mL 0.9% NaCL infusion Continuous    dextrose 50 % in water (D50W) injection 12.5 g PRN    dextrose 50 % in water (D50W) injection 25 g PRN    dextrose 50% injection 12.5 g PRN    dextrose 50% injection 25 g PRN    diclofenac sodium 1 % gel 2 g Daily PRN    [START ON 1/29/2019] fluticasone-vilanterol 100-25 mcg/dose diskus inhaler 1 puff Daily    glucagon (human recombinant) injection 1 mg PRN    glucose chewable tablet 16 g PRN    glucose chewable tablet 24 g PRN    hydrocortisone sodium succinate injection 50 mg Q8H    HYDROmorphone injection 0.5 mg Q2H PRN    insulin regular (Humulin R) 100 Units in sodium chloride 0.9% 100 mL infusion Continuous    ondansetron injection 4 mg Q8H PRN    pantoprazole injection 40 mg Q12H    piperacillin-tazobactam 4.5 g in sodium chloride 0.9% 100 mL IVPB (ready to mix system) Q8H    promethazine (PHENERGAN) 6.25 mg in dextrose 5 % 50 mL IVPB Q6H PRN    pyridoxine (vitamin B6) tablet 50 mg Daily    scopolamine 1.3-1.5 mg (1 mg over 3 days) 1 patch Q3 Days    sodium chloride 0.9% flush 5 mL PRN    sodium chloride 0.9% flush 5 mL PRN    sodium phosphate in 0.9 % NaCl 15 mmol/250 mL Soln 15 mmol Once    [START ON 1/29/2019] vancomycin in dextrose 5 % 1 gram/250 mL IVPB 1,000 mg Q24H       Objective:     Vital Signs (Most Recent):  Temp: 98.7 °F (37.1 °C) (01/28/19  1345)  Pulse: (!) 119 (01/28/19 1430)  Resp: 20 (01/28/19 1430)  BP: 103/67 (01/28/19 1430)  SpO2: 99 % (01/28/19 1430)  O2 Device (Oxygen Therapy): nasal cannula w/ humidification (01/28/19 1100) Vital Signs (24h Range):  Temp:  [97.8 °F (36.6 °C)-98.7 °F (37.1 °C)] 98.7 °F (37.1 °C)  Pulse:  [] 119  Resp:  [11-26] 20  SpO2:  [94 %-100 %] 99 %  BP: ()/(54-90) 103/67  Arterial Line BP: ()/() 91/75     Weight: 71.2 kg (157 lb) (01/26/19 0900)  Body mass index is 29.66 kg/m².  Body surface area is 1.75 meters squared.    I/O last 3 completed shifts:  In: 92838.1 [I.V.:5394.2; NG/GT:195; IV Piggyback:3494.5]  Out: 4007 [Urine:1368; Drains:253; Other:2386]    Physical Exam   Constitutional: She appears well-developed. She appears ill. No distress. She is sedated and intubated.   HENT:   Head: Normocephalic and atraumatic.   Right Ear: External ear normal.   Left Ear: External ear normal.   Eyes: Conjunctivae are normal. Right eye exhibits no discharge. Left eye exhibits no discharge.   Neck: Neck supple.   Cardiovascular: Normal rate and regular rhythm. Exam reveals no gallop and no friction rub.   No murmur heard.  Pulmonary/Chest: Breath sounds normal. She is intubated. She has no wheezes. She has no rales.   Abdominal:   LUCIANO drain/wound vac to RLQ   Musculoskeletal: She exhibits edema (upper extremities). She exhibits no deformity.   Skin: Skin is warm. She is not diaphoretic.       Significant Labs:  CBC:   Recent Labs   Lab 01/28/19  0315   WBC 8.81   RBC 2.87*   HGB 8.1*   HCT 22.3*   PLT 42*   MCV 78*   MCH 28.2   MCHC 36.3*     CMP:   Recent Labs   Lab 01/28/19  0805   *   CALCIUM 7.6*   ALBUMIN 1.6*   PROT 3.4*   *   K 3.6   CO2 23      BUN 6*   CREATININE 0.6   ALKPHOS 342*   ALT 21   AST 70*   BILITOT 2.8*            Assessment/Plan:     NIKHIL (acute kidney injury)    NIKHIL on normal renal function    64 yo AAM who presented with worsening nausea/vomiting for several  months.  Imaging revealed possible abscess to past appendectomy site and she underwent exp/lap with abscess drainage on 1/25.  Post-op course complicated by hypotension and lactic acidosis.  She was volume resuscitated with 5L of crystalloids.  On the morning of 1/26, UOP has slowly trended down and now with anuria. SLED initiated on 1/26.    NIKHIL 2/2 Ischemic ATN vs. Septic ATN    Plan/recommendations:  -SLED discontinued on 1/27. No urgent need for dialysis at this time. LA remains elevated today (3.2->4.0). Metabolic acidosis resolved with SLED. ABG 7.36/43.1/25.2. sCr 0.6.  -continue strict I/O's  -urine output fluctuating, 10-15 cc/hr earlier, now 45-70 cc/hr after. 1 L bolus given this AM. Total  ml/24 hr.   -CVP ranging 7-8, extubated this AM  -Will continue to follow electrolytes and acid base studies       Generalized abdominal pain    - Deferred to admitting service  - s/p exp/lap 1/25       Case discussed with staff further recs with attestation.     I will follow-up with patient. Please contact us if you have any additional questions.    MACO Dyson DNP, APRN, FNP-C  Department of Nephrology  [facility]  Pager: [phone]

## 2019-01-28 NOTE — ASSESSMENT & PLAN NOTE
BG goal 140 - 180     Continue IV insulin infusion protocol  Requires intensive BG monitoring while on protocol     Discharge planning: LARRY

## 2019-01-28 NOTE — ASSESSMENT & PLAN NOTE
Pt is a 63 y.o. female with hx of asthma, COPD, CAD, DM, HTN, CVA 2012 with R side deficits, HFpEF, severe debility , lap converted to open appendectomy 8/2018 complicated by cdiff, failure to thrive and recurrent fluid collection presented to the ED with intractable n/v and abdominal pain. Previous Cxs- MRSA, Enterococcus, Ecoli and Anaerobes.   On admit, Lactic acid 6.8, with tachycardia and WBC of 13.  1/24 CT shows fluid collection in R hemipelvis.  Started on vanc and zosyn.   Patient now s/p exploratory laparotomy, washout, and pelvic abscess drainage on 1/25/19. Gross purulence.      Blood cx positive for MRSA on 1/24. Repeat NGTD  Cxs from sx NGTD.  Gram Stain -GNR    -Continue pt on Vanc + Zosyn  -Will monitor repeat blood cx  -Will continue to monitor sx cxs.  -ID will continue to follow.

## 2019-01-28 NOTE — CONSULTS
[facility]  Infectious Disease  Consult Note    Patient Name: Sheryl Martines  MRN: [id number]  Admission Date: 1/23/2019  Hospital Length of Stay: 4 days  Attending Physician: Monster Laurent MD  Primary Care Provider: Primary Doctor No     Isolation Status: Contact    Patient information was obtained from relative(s), past medical records and ER records.      Consults  Assessment/Plan:     * Pelvic abscess in female    Pt is a 63 y.o. female with hx of asthma, COPD, CAD, DM, HTN, CVA 2012 with R side deficits, HFpEF, severe debility , lap converted to open appendectomy 8/2018 complicated by cdiff, failure to thrive and recurrent fluid collection presented to the ED with intractable n/v and abdominal pain. Previous Cxs- MRSA, Enterococcus, Ecoli and Anaerobes.   On admit, Lactic acid 6.8, with tachycardia and WBC of 13.  1/24 CT shows fluid collection in R hemipelvis.  Started on vanc and zosyn.   Patient now s/p exploratory laparotomy, washout, and pelvic abscess drainage on 1/25/19. Gross purulence.      Blood cx positive for MRSA on 1/24. Repeat NGTD  Cxs from sx NGTD.  Gram Stain -GNR    -Continue pt on Vanc + Zosyn  -Will monitor repeat blood cx  -Will continue to monitor sx cxs.  -ID will continue to follow.         Thank you for your consult. I will follow-up with patient. Please contact us if you have any additional questions.    Ej Chin PA-C  Infectious Disease  [facility]    Subjective:     Principal Problem: Pelvic abscess in female    HPI: Pt is a 63 y.o. female with hx of asthma, COPD, CAD, DM, HTN, CVA 2012 with R side deficits, HFpEF, severe debility , lap converted to open appendectomy 8/2018 complicated by cdiff, failure to thrive and recurrent fluid collection presented to the ED with intractable n/v and abdominal pain.      Appendectomy complcated by sx site infxn with recurrent fluid collection.  Cxs- MRSA, Enterococcus, Ecoli and  Anaerobes.   Abx treatment Vanc 9/11-10/1;  Pip-tazo 9/11-9/13, 9/23-10/1; Ertapenem 9/14-9/22.  No change in fluid collection.  Abx discontinued on 10/1. Lactic acid 6.8, with tachycardia and WBC of 13.  1/24 CT shows fluid collection in R hemipelvis.  Started on vanc and zosyn. Patient now s/p exploratory laparotomy, washout, and pelvic abscess drainage on 1/25/19. Gross purulence.  Blood cx positive for MRSA on 1/24.     Of note pt w/ R subclavian port o cath that has been present since 2005      Past Medical History:   Diagnosis Date    Abnormal LFTs 12/14/2018    Asthma     Closed compression fracture of fourth lumbar vertebra     COPD (chronic obstructive pulmonary disease)     Coronary artery disease     Diabetes mellitus     Fall 10/4/2018    Fracture     right tib & fib    Glaucoma     High cholesterol     Hypertension     Infected incision 9/20/2018    Intractable nausea and vomiting 1/23/2019    Iritis     Pulmonary embolus     S/P appendectomy 9/11/2018    Stroke     rt sided weakness.       Past Surgical History:   Procedure Laterality Date    ABDOMINAL SURGERY      APPENDECTOMY N/A 8/26/2018    Performed by [facility]rez [facility] at [facility] OR    APPENDECTOMY, LAPAROSCOPIC---CONVERTED TO OPEN APPENDECTOMY @0950 N/A 8/26/2018    Performed by [facility] at [facility] OR    APPLICATION, WOUND VAC N/A 1/25/2019    Performed by Monster Laurent MD at [facility] OR [address] [place] [address]    BACK SURGERY      stimulator    CATARACT EXTRACTION      EXCISION, ABSCESS- drainage abdominal wall abscess N/A 1/25/2019    [facility]    HYSTERECTOMY      INCISION AND DRAINAGE, ABSCESS-  drainage of pelvic abscess N/A 1/25/2019    Performed by Monster Laurent MD at [facility] OR [address] [place] [address]    LAPAROTOMY, EXPLORATORY N/A 1/25/2019    Performed by Monster Laurent MD at [facility] OR [address] [place] [address]    TOTAL HIP ARTHROPLASTY Left     2008       Review of patient's allergies  indicates:   Allergen Reactions    Ace inhibitors Swelling    Carvedilol      Other reaction(s): Other (See Comments)  blister    Hydralazine analogues     Metformin Nausea And Vomiting    Tetracycline Itching    Tetracyclines Swelling    Travatan (with benzalkonium) [travoprost (benzalkonium)]     Travoprost Itching     Other reaction(s): Other (See Comments)  Blurry vision       Medications:  Medications Prior to Admission   Medication Sig    acetaminophen (TYLENOL) 500 MG tablet Take 1,000 mg by mouth 2 (two) times daily as needed for Pain.    albuterol (PROVENTIL/VENTOLIN HFA) 90 mcg/actuation inhaler Inhale 2 puffs into the lungs every 6 (six) hours as needed for Wheezing or Shortness of Breath. Rescue    albuterol-ipratropium (DUO-NEB) 2.5 mg-0.5 mg/3 mL nebulizer solution Take 3 mLs by nebulization every 4 (four) hours as needed for Wheezing or Shortness of Breath. Rescue     aspirin (ECOTRIN) 81 MG EC tablet Take 1 tablet (81 mg total) by mouth once daily.    baclofen (LIORESAL) 10 MG tablet Take 10 mg by mouth 2 (two) times daily as needed (MUSCLE SPASMS).    cefdinir (OMNICEF) 300 MG capsule TK ONE C PO  BID FOR 7 DAYS    cephALEXin (KEFLEX) 750 MG capsule TK ONE C PO TID FOR 5 DAYS    ciprofloxacin HCl (CIPRO) 250 MG tablet Take 1 tablet (250 mg total) by mouth every 12 (twelve) hours. Starting 12/15 evening    clopidogrel (PLAVIX) 75 mg tablet Take 75 mg by mouth once daily.    diclofenac sodium (VOLTAREN) 1 % Gel Apply 2 g topically daily as needed (PAIN).    diltiaZEM (CARDIZEM CD) 180 MG 24 hr capsule Take 180 mg by mouth once daily.    DULoxetine (CYMBALTA) 60 MG capsule Take 60 mg by mouth once daily.    fluticasone-vilanterol (BREO ELLIPTA) 100-25 mcg/dose diskus inhaler Inhale 1 puff into the lungs once daily. Controller    gabapentin (NEURONTIN) 300 MG capsule Take 300 mg by mouth once daily.    Lactobacillus rhamnosus-fiber 2.5 billion cell-3.5 gram PwPk Take 1 capsule by  mouth.    lansoprazole (PREVACID) 30 MG capsule Take 30 mg by mouth once daily.    losartan (COZAAR) 100 MG tablet Take 100 mg by mouth once daily.     ondansetron (ZOFRAN) 4 MG tablet Take 1 tablet (4 mg total) by mouth every 6 (six) hours as needed.    ondansetron (ZOFRAN) 4 MG tablet Take 4-8 mg by mouth.    potassium chloride SA (K-DUR,KLOR-CON) 10 MEQ tablet Take 10 mEq by mouth.    predniSONE (DELTASONE) 10 MG tablet Take 4 tablets (40 mg) on 12/16, then take 1 tablet (10 mg) daily    pyridoxine, vitamin B6, (VITAMIN B-6) 50 MG Tab Take 1 tablet (50 mg total) by mouth once daily.    simvastatin (ZOCOR) 40 MG tablet Take 40 mg by mouth.    theophylline (THEODUR) 300 mg 24 hr capsule Take 300 mg by mouth once daily.    traMADol (ULTRAM) 50 mg tablet TK 1 T PO BID PRN    [DISCONTINUED] baclofen (LIORESAL) 10 MG tablet Take 10 mg by mouth.    [DISCONTINUED] furosemide (LASIX) 40 MG tablet Take 40 mg by mouth once daily.     [DISCONTINUED] HYDROcodone-acetaminophen (NORCO) 5-325 mg per tablet Take 1 tablet by mouth every 4 to 6 hours as needed.    [DISCONTINUED] prazosin (MINIPRESS) 5 MG capsule TK 1 C PO BID     Antibiotics (From admission, onward)    Start     Stop Route Frequency Ordered    01/29/19 1030  vancomycin in dextrose 5 % 1 gram/250 mL IVPB 1,000 mg  (Vancomycin IVPB with levels panel)      -- IV Every 24 hours (non-standard times) 01/28/19 1426    01/24/19 1015  piperacillin-tazobactam 4.5 g in sodium chloride 0.9% 100 mL IVPB (ready to mix system)      -- IV Every 8 hours (non-standard times) 01/24/19 1013        Antifungals (From admission, onward)    None        Antivirals (From admission, onward)    None           Immunization History   Administered Date(s) Administered    PPD Test 08/28/2018, 09/14/2018       Family History     Problem Relation (Age of Onset)    Cancer Father    Diabetes Brother    Lupus Sister    Multiple sclerosis Mother        Social History     Socioeconomic  History    Marital status:      Spouse name: None    Number of children: None    Years of education: None    Highest education level: None   Social Needs    Financial resource strain: None    Food insecurity - worry: None    Food insecurity - inability: None    Transportation needs - medical: None    Transportation needs - non-medical: None   Occupational History    None   Tobacco Use    Smoking status: Never Smoker    Smokeless tobacco: Never Used   Substance and Sexual Activity    Alcohol use: No     Frequency: Never    Drug use: No    Sexual activity: No     Partners: Male   Other Topics Concern    None   Social History Narrative    None     Review of Systems   Unable to perform ROS: Intubated     Objective:     Vital Signs (Most Recent):  Temp: 98.7 °F (37.1 °C) (01/28/19 1345)  Pulse: (!) 119 (01/28/19 1430)  Resp: 20 (01/28/19 1430)  BP: 103/67 (01/28/19 1430)  SpO2: 99 % (01/28/19 1430) Vital Signs (24h Range):  Temp:  [97.8 °F (36.6 °C)-98.7 °F (37.1 °C)] 98.7 °F (37.1 °C)  Pulse:  [] 119  Resp:  [11-26] 20  SpO2:  [94 %-100 %] 99 %  BP: ()/(54-90) 103/67  Arterial Line BP: ()/() 91/75     Weight: 71.2 kg (157 lb)  Body mass index is 29.66 kg/m².    Estimated Creatinine Clearance: 86.7 mL/min (based on SCr of 0.6 mg/dL).    Physical Exam   Constitutional: She appears well-developed. She is sedated and intubated.   HENT:   Head: Normocephalic and atraumatic.   Eyes: Conjunctivae are normal. Right eye exhibits no discharge. Left eye exhibits no discharge.   Neck: Neck supple.   Cardiovascular: Normal rate and regular rhythm. Exam reveals no gallop and no friction rub.   No murmur heard.  Pulmonary/Chest: Breath sounds normal. She is intubated. She has no wheezes. She has no rales.   Abdominal:   Wound vac to RLQ   Musculoskeletal: She exhibits edema. She exhibits no deformity.   Skin: Skin is warm. She is not diaphoretic.       Significant Labs:   Blood Culture:    Recent Labs   Lab 12/13/18  0537 01/24/19  0833 01/24/19  0839 01/25/19  1752 01/26/19  0109   LABBLOO No growth after 5 days. Gram stain aer bottle: Gram positive cocci in clusters resembling Staph   Results called to and read back by: Micheline Martin RN 01/25/2019    03:43  METHICILLIN RESISTANT STAPHYLOCOCCUS AUREUS  ID consult required at [facility]   locations.   Gram stain aer bottle: Gram positive cocci in clusters resembling Staph   Results called to and read back by: Micheline Montgomery RN 01/25/2019    02:39  METHICILLIN RESISTANT STAPHYLOCOCCUS AUREUS  ID consult required at [facility] and [facility].  For susceptibility see order # [id number]   No Growth to date  No Growth to date  No Growth to date No Growth to date  No Growth to date  No Growth to date     BMP:   Recent Labs   Lab 01/28/19  0805   *   *   K 3.6      CO2 23   BUN 6*   CREATININE 0.6   CALCIUM 7.6*   MG 1.9     CBC:   Recent Labs   Lab 01/27/19  0330 01/28/19  0315   WBC 7.62 8.81   HGB 9.3* 8.1*   HCT 26.5* 22.3*   PLT 59* 42*     CMP:   Recent Labs   Lab 01/28/19  0015  01/28/19  0315 01/28/19  0530 01/28/19  0805     --  136  --  135*   K 3.6   < > 3.3* 3.9 3.6     --  104  --  106   CO2 25  --  24  --  23   *  --  375*  --  232*   BUN 5*  --  5*  --  6*   CREATININE 0.6  --  0.6  --  0.6   CALCIUM 7.3*  --  7.9*  --  7.6*   PROT 3.6*  --  3.8*  --  3.4*   ALBUMIN 1.6*  --  1.7*  --  1.6*   BILITOT 2.4*  --  2.7*  --  2.8*   ALKPHOS 263*  --  307*  --  342*   AST 48*  --  60*  --  70*   ALT 13  --  16  --  21   ANIONGAP 8  --  8  --  6*   EGFRNONAA >60.0  --  >60.0  --  >60.0    < > = values in this interval not displayed.     Microbiology Results (last 7 days)     Procedure Component Value Units Date/Time    AFB Culture & Smear [[id number]] Collected:  01/25/19 1000    Order Status:  Completed Specimen:  Abscess from Abdomen  Updated:  01/28/19 1526     AFB Culture & Smear Culture in progress     AFB CULTURE STAIN No acid fast bacilli seen.    Narrative:       Pelvic abscess    AFB Culture & Smear [[id number]] Collected:  01/25/19 1002    Order Status:  Completed Specimen:  Abscess from Abdomen Updated:  01/28/19 1526     AFB Culture & Smear Culture in progress     AFB CULTURE STAIN No acid fast bacilli seen.    Narrative:       Abdominal wall abscess    Aerobic culture [[id number]] Collected:  01/25/19 1000    Order Status:  Completed Specimen:  Abscess from Abdomen Updated:  01/28/19 0927     Aerobic Bacterial Culture No growth    Narrative:       Pelvic abscess    Aerobic culture [[id number]] Collected:  01/25/19 1002    Order Status:  Completed Specimen:  Abscess from Abdomen Updated:  01/28/19 0927     Aerobic Bacterial Culture No growth    Narrative:       Abdominal wall abscess    Culture, Anaerobe [[id number]] Collected:  01/25/19 1000    Order Status:  Completed Specimen:  Abscess from Abdomen Updated:  01/28/19 0753     Anaerobic Culture Culture in progress    Narrative:       Pelvic abscess    Culture, Anaerobe [[id number]] Collected:  01/25/19 1002    Order Status:  Completed Specimen:  Abscess from Abdomen Updated:  01/28/19 0753     Anaerobic Culture Culture in progress    Narrative:       Abdominal wall abscess    Blood culture [[id number]] Collected:  01/26/19 0109    Order Status:  Completed Specimen:  Blood from Line, Jugular, Internal Left Updated:  01/28/19 0612     Blood Culture, Routine No Growth to date     Blood Culture, Routine No Growth to date     Blood Culture, Routine No Growth to date    Blood culture [[id number]] Collected:  01/25/19 1752    Order Status:  Completed Specimen:  Blood from Line, Arterial, Left Updated:  01/28/19 0612     Blood Culture, Routine No Growth to date     Blood Culture, Routine No Growth to date     Blood Culture, Routine No Growth to date    Blood culture [[id number]] Collected:  01/24/19  0839    Order Status:  Completed Specimen:  Blood Updated:  01/27/19 1113     Blood Culture, Routine Gram stain aer bottle: Gram positive cocci in clusters resembling Staph      Blood Culture, Routine Results called to and read back by: Micheline Montgomery RN 01/25/2019       Blood Culture, Routine 02:39     Blood Culture, Routine --     METHICILLIN RESISTANT STAPHYLOCOCCUS AUREUS  ID consult required at [facility] and [facility].  For susceptibility see order # [id number]      Blood culture [[id number]]  (Susceptibility) Collected:  01/24/19 0833    Order Status:  Completed Specimen:  Blood Updated:  01/27/19 1113     Blood Culture, Routine Gram stain aer bottle: Gram positive cocci in clusters resembling Staph      Blood Culture, Routine Results called to and read back by: Micheline Martin RN 01/25/2019       Blood Culture, Routine 03:43     Blood Culture, Routine --     METHICILLIN RESISTANT STAPHYLOCOCCUS AUREUS  ID consult required at [facility] and [facility].      Blood culture [[id number]] Collected:  01/25/19 1752    Order Status:  Sent Specimen:  Blood from Line, Arterial, Left Updated:  01/25/19 1753    Gram stain [[id number]] Collected:  01/25/19 1000    Order Status:  Completed Specimen:  Abscess from Abdomen Updated:  01/25/19 1231     Gram Stain Result Few WBC's      Rare Gram negative rods    Narrative:       Pelvic abscess    Gram stain [[id number]] Collected:  01/25/19 1002    Order Status:  Completed Specimen:  Abscess from Abdomen Updated:  01/25/19 1225     Gram Stain Result No WBC's      No organisms seen    Narrative:       Abdominal wall abscess    Fungus culture [[id number]] Collected:  01/25/19 1002    Order Status:  Sent Specimen:  Abscess from Abdomen Updated:  01/25/19 1132    Fungus culture [[id number]] Collected:  01/25/19 1000    Order Status:  Sent Specimen:  Abscess from Abdomen Updated:  01/25/19 1128        Wound Culture:   Recent  Labs   Lab 08/26/18  1015 09/05/18  1246 09/10/18  1817 01/25/19  1000 01/25/19  1002   LABAERO ESCHERICHIA COLI  Moderate    ENTEROCOCCUS RAFFINOSUS  Moderate   METHICILLIN RESISTANT STAPHYLOCOCCUS AUREUS  Few    METHICILLIN RESISTANT STAPHYLOCOCCUS AUREUS  Moderate   METHICILLIN RESISTANT STAPHYLOCOCCUS AUREUS  Moderate  Skin david also present   No growth No growth     All pertinent labs within the past 24 hours have been reviewed.  Recent Lab Results  (Last 25 results in the past 24 hours)      01/28/19  1603   01/28/19  1524   01/28/19  1457   01/28/19  1431   01/28/19  1428        Immature Granulocytes               Immature Grans (Abs)               Albumin               Alkaline Phosphatase               Allens Test               ALT               Anion Gap               Aniso               AST               BANDS               Baso #               Basophil%               Total Bilirubin               Site               BUN, Bld               Cherie Cells               Calcium               Calcium, Ion               Chloride               CO2               Creatinine               DelSys               Differential Method               eGFR if                eGFR if non                Eos #               Eosinophil%               FiO2               Fragmented Cells               Glucose               Gran%               Hematocrit               Hemoglobin               Hypo               Lactate, Dom               Lymph #               Lymph%               Magnesium               MCH               MCHC               MCV               Min Vol               Mode               Mono #               Mono%               MPV               nRBC               Pappenheimer Bodies               PEEP               Phosphorus               PiP               Platelets               POC BE               POC HCO3               POC PCO2               POC PH               POC PO2                POC SATURATED O2               POC TCO2               POCT Glucose 65 93 68 58 60     Poik               Poly               Potassium               Prealbumin               Total Protein               Rate               RBC               RDW               Sample               Sodium               Sp02               Target Cells               Theophylline Lvl               Vancomycin, Random               Vt               WBC                                01/28/19  1354   01/28/19  1246   01/28/19  1200   01/28/19  1156   01/28/19  1102        Immature Granulocytes               Immature Grans (Abs)               Albumin               Alkaline Phosphatase               Allens Test               ALT               Anion Gap               Aniso               AST               BANDS               Baso #               Basophil%               Total Bilirubin               Site               BUN, Bld               Kinmundy Cells               Calcium               Calcium, Ion               Chloride               CO2               Creatinine               DelSys               Differential Method               eGFR if                eGFR if non                Eos #               Eosinophil%               FiO2               Fragmented Cells               Glucose               Gran%               Hematocrit               Hemoglobin               Hypo               Lactate, Dom     4.0  Comment:  Falsely low lactic acid results can be found in samples   containing >=13.0 mg/dL total bilirubin and/or >=3.5 mg/dL   direct bilirubin.  *Critical value -   Results called to and read back by:TONY ROCA RN           Lymph #               Lymph%               Magnesium               MCH               MCHC               MCV               Min Vol               Mode               Mono #               Mono%               MPV               nRBC               Pappenheimer Bodies               PEEP                Phosphorus               PiP               Platelets               POC BE               POC HCO3               POC PCO2               POC PH               POC PO2               POC SATURATED O2               POC TCO2               POCT Glucose 75 109   144 153     Poik               Poly               Potassium               Prealbumin               Total Protein               Rate               RBC               RDW               Sample               Sodium               Sp02               Target Cells               Theophylline Lvl     <2.0  Comment:  Toxic: >20.0 ug/mL         Vancomycin, Random               Vt               WBC                                01/28/19  1001   01/28/19  0901   01/28/19  0816   01/28/19  0811   01/28/19  0805        Immature Granulocytes               Immature Grans (Abs)               Albumin         1.6     Alkaline Phosphatase         342     Allens Test     N/A         ALT         21     Anion Gap         6     Aniso               AST         70     BANDS               Baso #               Basophil%               Total Bilirubin         2.8  Comment:  For infants and newborns, interpretation of results should be based  on gestational age, weight and in agreement with clinical  observations.  Premature Infant recommended reference ranges:  Up to 24 hours.............<8.0 mg/dL  Up to 48 hours............<12.0 mg/dL  3-5 days..................<15.0 mg/dL  6-29 days.................<15.0 mg/dL       Site     Lio/UAC         BUN, Bld         6     Cherie Cells               Calcium         7.6     Calcium, Ion         1.11     Chloride         106     CO2         23     Creatinine         0.6     DelSys     Adult Vent         Differential Method               eGFR if          >60.0     eGFR if non          >60.0  Comment:  Calculation used to obtain the estimated glomerular filtration  rate (eGFR) is the CKD-EPI equation.        Eos #                Eosinophil%               FiO2     40         Fragmented Cells               Glucose         232     Gran%               Hematocrit               Hemoglobin               Hypo               Lactate, Dom         3.2  Comment:  Falsely low lactic acid results can be found in samples   containing >=13.0 mg/dL total bilirubin and/or >=3.5 mg/dL   direct bilirubin.       Lymph #               Lymph%               Magnesium         1.9     MCH               MCHC               MCV               Min Vol     5         Mode     AC/PRVC         Mono #               Mono%               MPV               nRBC               Pappenheimer Bodies               PEEP     5         Phosphorus         1.7     PiP     22         Platelets               POC BE     0         POC HCO3     25.2         POC PCO2     43.8         POC PH     7.367         POC PO2     98         POC SATURATED O2     97         POC TCO2     26         POCT Glucose 166 204   229       Poik               Poly               Potassium         3.6     Prealbumin               Total Protein         3.4     Rate     15         RBC               RDW               Sample     ARTERIAL         Sodium         135     Sp02     99         Target Cells               Theophylline Lvl               Vancomycin, Random         25.4     Vt     350         WBC                                01/28/19  0711   01/28/19  0625   01/28/19  0532   01/28/19  0530   01/28/19  0448        Immature Granulocytes               Immature Grans (Abs)               Albumin               Alkaline Phosphatase               Allens Test               ALT               Anion Gap               Aniso               AST               BANDS               Baso #               Basophil%               Total Bilirubin               Site               BUN, Bld               Cherie Cells               Calcium               Calcium, Ion       1.09       Chloride               CO2               Creatinine                DelSys               Differential Method               eGFR if                eGFR if non                Eos #               Eosinophil%               FiO2               Fragmented Cells               Glucose               Gran%               Hematocrit               Hemoglobin               Hypo               Lactate, Dom               Lymph #               Lymph%               Magnesium               MCH               MCHC               MCV               Min Vol               Mode               Mono #               Mono%               MPV               nRBC               Pappenheimer Bodies               PEEP               Phosphorus               PiP               Platelets               POC BE               POC HCO3               POC PCO2               POC PH               POC PO2               POC SATURATED O2               POC TCO2               POCT Glucose 247 278 302   324     Poik               Poly               Potassium       3.9       Prealbumin               Total Protein               Rate               RBC               RDW               Sample               Sodium               Sp02               Target Cells               Theophylline Lvl               Vancomycin, Random               Vt               WBC                                01/28/19  0400   01/28/19  0315   01/28/19  0315   01/28/19  0233   01/28/19  0231        Immature Granulocytes     CANCELED  Comment:  Result canceled by the ancillary.         Immature Grans (Abs)     CANCELED  Comment:  Mild elevation in immature granulocytes is non specific and   can be seen in a variety of conditions including stress response,   acute inflammation, trauma and pregnancy. Correlation with other   laboratory and clinical findings is essential.    Result canceled by the ancillary.           Albumin     1.7         Alkaline Phosphatase     307         Allens Test               ALT     16         Anion Gap     8          Aniso     Slight         AST     60         BANDS     6.0         Baso #     CANCELED  Comment:  Result canceled by the ancillary.         Basophil%     0.0         Total Bilirubin     2.7  Comment:  For infants and newborns, interpretation of results should be based  on gestational age, weight and in agreement with clinical  observations.  Premature Infant recommended reference ranges:  Up to 24 hours.............<8.0 mg/dL  Up to 48 hours............<12.0 mg/dL  3-5 days..................<15.0 mg/dL  6-29 days.................<15.0 mg/dL           Site               BUN, Bld     5         [place] Cells     Occasional         Calcium     7.9         Calcium, Ion               Chloride     104         CO2     24         Creatinine     0.6         DelSys               Differential Method     Manual         eGFR if      >60.0         eGFR if non      >60.0  Comment:  Calculation used to obtain the estimated glomerular filtration  rate (eGFR) is the CKD-EPI equation.            Eos #     CANCELED  Comment:  Result canceled by the ancillary.         Eosinophil%     0.0         FiO2               Fragmented Cells     Occasional         Glucose     375         Gran%     90.0         Hematocrit     22.3         Hemoglobin     8.1         Hypo     Occasional         Lactate, Dom     3.9  Comment:  Falsely low lactic acid results can be found in samples   containing >=13.0 mg/dL total bilirubin and/or >=3.5 mg/dL   direct bilirubin.  *Critical value -   Results called to and read back by:Gale Olivia   RN           Lymph #     CANCELED  Comment:  Result canceled by the ancillary.         Lymph%     4.0         Magnesium     2.3         MCH     28.2         MCHC     36.3         MCV     78         Min Vol               Mode               Mono #     CANCELED  Comment:  Result canceled by the ancillary.         Mono%     0.0         MPV     SEE COMMENT  Comment:  Result not available.          nRBC     0         Pappenheimer Bodies     Present         PEEP               Phosphorus     1.7         PiP               Platelets     42         POC BE               POC HCO3               POC PCO2               POC PH               POC PO2               POC SATURATED O2               POC TCO2               POCT Glucose 377 354   368 431     Poik     Slight         Poly     Occasional         Potassium     3.3         Prealbumin     3         Total Protein     3.8         Rate               RBC     2.87         RDW     18.6         Sample               Sodium     136         Sp02               Target Cells     Occasional         Theophylline Lvl               Vancomycin, Random               Vt               WBC     8.81                              Significant Imaging: I have reviewed all pertinent imaging results/findings within the past 24 hours.

## 2019-01-28 NOTE — SUBJECTIVE & OBJECTIVE
Interval History/Significant Events: Overnight, 2L IVF bolus. Hyperglycemia, on insulin gtt, endocrine following. Peaked in the ~500s, now in the ~200s. Lactate trended down, now trending back up, last 3.9. Minimal vent settings. On/off vaso, off levo. Urine output ~20cc/hr, 960 Uop 24 hours. On vanc/zosyn. TPN held for hyperglycemia. Hold heparin for thrombocytopenia.    Follow-up For: Procedure(s) (LRB):  LAPAROTOMY, EXPLORATORY (N/A)  INCISION AND DRAINAGE, ABSCESS-  drainage of pelvic abscess (N/A)  EXCISION, ABSCESS- drainage abdominal wall abscess (N/A)  APPLICATION, WOUND VAC (N/A)    Post-Operative Day: 3 Days Post-Op    Objective:     Vital Signs (Most Recent):  Temp: 97.8 °F (36.6 °C) (01/28/19 0730)  Pulse: 92 (01/28/19 0730)  Resp: 15 (01/28/19 0730)  BP: 117/65 (01/28/19 0700)  SpO2: 100 % (01/28/19 0730) Vital Signs (24h Range):  Temp:  [97.8 °F (36.6 °C)-98.4 °F (36.9 °C)] 97.8 °F (36.6 °C)  Pulse:  [63-97] 92  Resp:  [11-20] 15  SpO2:  [97 %-100 %] 100 %  BP: ()/(54-70) 117/65  Arterial Line BP: ()/() 146/73     Weight: 71.2 kg (157 lb)  Body mass index is 29.66 kg/m².      Intake/Output Summary (Last 24 hours) at 1/28/2019 0746  Last data filed at 1/28/2019 0700  Gross per 24 hour   Intake 6830.14 ml   Output 1311 ml   Net 5519.14 ml       Physical Exam   Constitutional: She appears well-developed and well-nourished. No distress.   HENT:   Head: Normocephalic.   Eyes: Pupils are equal, round, and reactive to light.   Cardiovascular: Regular rhythm.   Tachycardic   Pulmonary/Chest: No respiratory distress. She has no wheezes.   Intubated, mechanical breath sounds   Abdominal: Soft. She exhibits no distension.   Neurological:   lethargic   Skin: Skin is warm and dry.       Vents:  Vent Mode: A/C (01/28/19 0500)  Ventilator Initiated: Yes (01/26/19 0239)  Set Rate: 15 bmp (01/28/19 0500)  Vt Set: 350 mL (01/28/19 0500)  PEEP/CPAP: 5 cmH20 (01/28/19 0500)  Oxygen Concentration (%): 40  (01/28/19 0730)  Peak Airway Pressure: 22 cmH2O (01/28/19 0500)  Plateau Pressure: 5 cmH20 (01/28/19 0500)  Total Ve: 5.62 mL (01/28/19 0500)  F/VT Ratio<105 (RSBI): (!) 37.13 (01/28/19 0500)    Lines/Drains/Airways     Central Venous Catheter Line                 Port A Cath Single Lumen 01/23/19 1400 right subclavian 4 days         Percutaneous Central Line Insertion/Assessment - triple lumen  01/26/19 0010 left internal jugular 2 days         Trialysis (Dialysis) Catheter 01/26/19 1200 right internal jugular 1 day          Drain                 Closed/Suction Drain 01/25/19 1020 Right;Ventral Abdomen Bulb 19 Fr. 2 days         Urethral Catheter 01/25/19 0934 Straight-tip;Non-latex 16 Fr. 2 days         NG/OG Tube 01/26/19 1200 Center mouth 1 day          Airway                 Airway - Non-Surgical 01/26/19 Endotracheal Tube 2 days          Arterial Line                 Arterial Line 01/25/19 1049 Left Radial 2 days          Pressure Ulcer                 Negative Pressure Wound Therapy  2 days                Significant Labs:    CBC/Anemia Profile:  Recent Labs   Lab 01/27/19  0330 01/28/19  0315   WBC 7.62 8.81   HGB 9.3* 8.1*   HCT 26.5* 22.3*   PLT 59* 42*   MCV 77* 78*   RDW 17.2* 18.6*        Chemistries:  Recent Labs   Lab 01/27/19  1551 01/27/19 2005 01/28/19  0015 01/28/19  0145 01/28/19  0315 01/28/19  0530   * 136 136  --  136  --    K 4.1 3.6 3.6 3.5 3.3* 3.9    104 103  --  104  --    CO2 22* 24 25  --  24  --    BUN 4* 5* 5*  --  5*  --    CREATININE 0.6 0.6 0.6  --  0.6  --    CALCIUM 6.6* 7.1* 7.3*  --  7.9*  --    ALBUMIN 1.0*  --  1.6*  --  1.7*  --    PROT 2.8*  --  3.6*  --  3.8*  --    BILITOT 1.7*  --  2.4*  --  2.7*  --    ALKPHOS 232*  --  263*  --  307*  --    ALT 9*  --  13  --  16  --    AST 24  --  48*  --  60*  --    MG 2.3  --  2.2  --  2.3  --    PHOS 2.0*  --  2.3*  --  1.7*  --        All pertinent labs within the past 24 hours have been reviewed.    Significant  Imaging:  I have reviewed all pertinent imaging results/findings within the past 24 hours.

## 2019-01-28 NOTE — PROGRESS NOTES
"[facility]  Adult Nutrition  Progress Note    SUMMARY       Recommendations  Recommendation/Intervention:   Current TPN exceeding EEN, recommend modifying to 105g AA and 175g Dextrose + IV lipids daily - to provide 1515 kcal/day and 105g protein/day.   RD to monitor.    Goals: Patient to meet > 85% EEN and EPN  Nutrition Goal Status: new  Communication of RD Recs: (POC)    Reason for Assessment  Reason For Assessment: RD follow-up  Diagnosis: infection/sepsis(pelvic abscess)  Relevant Medical History: COPD, CVA, DM, HTN, HLD  General Information Comments: Currently intubated, sedated. S/p ex lap, washout, and I&D pelvic abscess . TPN on hold for hyperglycemia, plan to restart once blood sugar levels controlled. CRRT on hold as well. (NFPE completed, patient with both fat and muscle wasting as well as moderate edema. Patient also with weight loss PTA and poor PO intake per MD note. Unable to speak with patient 2/2 intubation. Patient with moderate malnutrition.)  Nutrition Discharge Planning: Unable to determine at this time.    Nutrition Risk Screen  Nutrition Risk Screen: no indicators present    Nutrition/Diet History  Typical Food/Fluid Intake: Per MD note, patient reported poor PO itnake PTA 2/2 abdominal pain and N/V.  Spiritual, Cultural Beliefs, Lutheran Practices, Values that Affect Care: no  Factors Affecting Nutritional Intake: NPO, on mechanical ventilation    Anthropometrics  Temp: 98.7 °F (37.1 °C)  Height: 5' 1" (154.9 cm)  Height (inches): 61 in  Weight Method: Bed Scale  Weight: 71.2 kg (157 lb)  Weight (lb): 157 lb  Ideal Body Weight (IBW), Female: 105 lb  % Ideal Body Weight, Female (lb): 149.52 lb  BMI (Calculated): 29.7  BMI Grade: 25 - 29.9 - overweight  Usual Body Weight (UBW), k.6 kg(per chart review 2018)  % Usual Body Weight: 87.46  % Weight Change From Usual Weight: -12.73 %    Lab/Procedures/Meds  Pertinent Labs Reviewed: reviewed  Pertinent Labs Comments: " Na 135, Glu 232, POCT Glu 204-278, HgbA1c 4.7, Ca 7.6, Phos 1.7, Alb 1.6  Pertinent Medications Reviewed: reviewed  Pertinent Medications Comments: pantoprazole, precedex, fentnayl, inuslin drip, levophed, vasopressin    Estimated/Assessed Needs  Weight Used For Calorie Calculations: 71.2 kg (156 lb 15.5 oz)  Energy Calorie Requirements (kcal): 1418 kcal/day  Energy Need Method: [facility]  Protein Requirements:  g/day(1.2-1.5 g/kg)  Weight Used For Protein Calculations: 71.2 kg (156 lb 15.5 oz)  Fluid Requirements (mL): 1 mL/kcal or per MD  Estimated Fluid Requirement Method: RDA Method  RDA Method (mL): 1418  CHO Requirement: 50% total kcal    Nutrition Prescription Ordered  Current Diet Order: NPO  Current Nutrition Support Formula Ordered: (Custom TPN 6% AA/20% Dextrose + lipids)  Current Nutrition Support Rate Ordered: 75 (ml)  Current Nutrition Support Frequency Ordered: mL/hr    Evaluation of Received Nutrient/Fluid Intake  Parenteral Calories (kcal): 1656  Parenteral Protein (gm): 108  Parenteral Fluid (mL): 1800  Lipid Calories (kcals): 500 kcals  GIR (Glucose Infusion Rate) (mg/kg/min): 3.51 mg/kg/min  Total Calories (kcal): 2156  % Kcal Needs: 152  % Protein Needs: 101  I/O: +5.5L x 24hrs, +25.1L since admit  Energy Calories Required: exceeds needs  Protein Required: meeting needs  Fluid Required: (per MD)  Comments: LBM 1/23  Tolerance: (on hold)  % Intake of Estimated Energy Needs: Other: > 100%  % Meal Intake: NPO    Nutrition Risk  Level of Risk/Frequency of Follow-up: low(1x/week)     Assessment and Plan  Moderate malnutrition    Contributing Nutrition Diagnosis  Malnutrition in the context of Acute Illness/Injury    Related to (etiology):  Decreased PO intake 2/2 abdominal pain and N/V    Signs and Symptoms (as evidenced by):  Energy Intake: <75% of estimated energy requirement for several months per patient  Body Fat Depletion: mild depletion of orbitals and triceps   Muscle Mass Depletion:  mild and moderate depletion of temples, clavicle region and lower extremities   Weight Loss: 12.7% x 5 months   Fluid Accumulation: moderate    Interventions/Recommendations (treatment strategy):  Collaboration and referral of nutrition care     Nutrition Diagnosis Status:  Continues     Monitor and Evaluation  Food and Nutrient Intake: energy intake, parenteral nutrition intake  Food and Nutrient Adminstration: enteral and parenteral nutrition administration  Anthropometric Measurements: weight, weight change  Biochemical Data, Medical Tests and Procedures: electrolyte and renal panel, gastrointestinal profile, glucose/endocrine profile, inflammatory profile  Nutrition-Focused Physical Findings: overall appearance     Malnutrition Assessment  Orbital Region (Subcutaneous Fat Loss): mild depletion  Upper Arm Region (Subcutaneous Fat Loss): mild depletion   Judaism Region (Muscle Loss): moderate depletion  Clavicle Bone Region (Muscle Loss): mild depletion  Anterior Thigh Region (Muscle Loss): mild depletion   Edema (Fluid Accumulation): 3-->moderate     Nutrition Follow-Up  RD Follow-up?: Yes

## 2019-01-28 NOTE — NURSING
Notified MD regarding BS> 400 QH since 1500, current insulin rate per protocol @ 21u/hr, requesting endocrine consult for additional management recommendations.     Verbalized agreement, will continue to monitor.

## 2019-01-28 NOTE — CONSULTS
[facility]  Infectious Disease  Consult Note    Patient Name: Sheryl Martines  MRN: [id number]  Admission Date: 1/23/2019  Hospital Length of Stay: 4 days  Attending Physician: Monster Laurent MD  Primary Care Provider: Primary Doctor No     Isolation Status: Contact        Inpatient consult to Infectious Diseases  Consult performed by: Ej Chin PA-C  Consult ordered by: Price Mackay MD      ID consult received. Chart being reviewed. Full consult note with recommendations to follow.      Thank you,  Ej Chin PA-C  [phone]

## 2019-01-28 NOTE — PROGRESS NOTES
"[facility]  General Surgery  Progress Note    Subjective:     History of Present Illness:  64 yo W with a history of HTN, COPD on home oxygen, HFpEF, IDDMII, CVA on plavix, HTN, debility after fall and broken hip, and PE who presents to the ED with a several month history of nausea, vomiting, inability to tolerate a diet and weight loss. She has had multiple admissions in the past few months for different ailments. She presents today with continued nausea, vomiting and abdominal pain that is mostly worse in the RLQ. During the examination, she states her pain was all over her abdomen because she was retching so much. She states that she has lost close to 40lbs since her surgery and that she only able to keep broth down. She had a lap converted to open appendectomy back in August 2018 that was complicated by a wound infection, C diff and failure to thrive. This seems to persists. She is now wheelchair bound (per her) and apparently lives in [place] but is here in [place] with her daughter.    She had a CT scan in the ED which revealed an irregular shaped rim enhancing fluid collection measuring at least 4.0 x 3.0 cm ight hemipelvis at the site of prior appendectomy. Surgery was consulted for further recommendations. She was also noted to have a "knot" at the RLQ incision site that is also tender.    Post-Op Info:  Procedure(s) (LRB):  LAPAROTOMY, EXPLORATORY (N/A)  INCISION AND DRAINAGE, ABSCESS-  drainage of pelvic abscess (N/A)  EXCISION, ABSCESS- drainage abdominal wall abscess (N/A)  APPLICATION, WOUND VAC (N/A)   3 Days Post-Op     Interval History: Off CRRT since yesterday am.  ml total. PH normalized, lactate 3.9 this am received 1L bolus.     Medications:  Continuous Infusions:   dexmedetomidine (PRECEDEX) infusion Stopped (01/26/19 1300)    fentanyl 75 mcg/hr (01/28/19 0800)    insulin (HUMAN R) infusion (adults) 99 Units/hr (01/28/19 0800)    norepinephrine bitartrate-D5W " Stopped (01/27/19 1932)    propofol 10 mcg/kg/min (01/28/19 0800)    TPN ADULT CENTRAL LINE CUSTOM Stopped (01/27/19 2308)    vasopressin (PITRESSIN) infusion 0.02 Units/min (01/28/19 0800)     Scheduled Meds:   albuterol-ipratropium  3 mL Nebulization Q6H    aspirin  81 mg Per NG tube Daily    clopidogrel 5mg/mL  75 mg Per NG tube Daily    fat emulsion 20%  250 mL Intravenous Daily    fluticasone-vilanterol  1 puff Inhalation Daily    hydrocortisone sodium succinate  100 mg Intravenous Q8H    pantoprozole (PROTONIX) IV  40 mg Intravenous Q12H    piperacillin-tazobactam (ZOSYN) IVPB  4.5 g Intravenous Q8H    potassium chloride  50 mEq Oral Once    potassium chloride  50 mEq Oral Once    potassium phosphate IVPB  30 mmol Intravenous Once    pyridoxine (vitamin B6)  50 mg Oral Daily    scopolamine  1 patch Transdermal Q3 Days    theophylline  300 mg Oral Daily    vancomycin (VANCOCIN) IVPB  1,000 mg Intravenous Q12H     PRN Meds:sodium chloride, albuterol sulfate, dextrose 50 % in water (D50W), dextrose 50 % in water (D50W), dextrose 50%, dextrose 50%, diclofenac sodium, fentaNYL, glucagon (human recombinant), glucose, glucose, ondansetron, promethazine (PHENERGAN) IVPB, sodium chloride 0.9%, sodium chloride 0.9%     Review of patient's allergies indicates:   Allergen Reactions    Ace inhibitors Swelling    Carvedilol      Other reaction(s): Other (See Comments)  blister    Hydralazine analogues     Metformin Nausea And Vomiting    Tetracycline Itching    Tetracyclines Swelling    Travatan (with benzalkonium) [travoprost (benzalkonium)]     Travoprost Itching     Other reaction(s): Other (See Comments)  Blurry vision     Objective:     Vital Signs (Most Recent):  Temp: 97.8 °F (36.6 °C) (01/28/19 0730)  Pulse: 85 (01/28/19 0816)  Resp: 20 (01/28/19 0816)  BP: (!) 101/56 (01/28/19 0800)  SpO2: 99 % (01/28/19 0816) Vital Signs (24h Range):  Temp:  [97.8 °F (36.6 °C)-98.4 °F (36.9 °C)] 97.8 °F  (36.6 °C)  Pulse:  [63-97] 85  Resp:  [11-20] 20  SpO2:  [97 %-100 %] 99 %  BP: ()/(54-70) 101/56  Arterial Line BP: ()/() 94/54     Weight: 71.2 kg (157 lb)  Body mass index is 29.66 kg/m².    Intake/Output - Last 3 Shifts       01/26 0700 - 01/27 0659 01/27 0700 - 01/28 0659 01/28 0700 - 01/29 0659    I.V. (mL/kg) 8192.4 (115.1) 2571.8 (36.1)     Blood 350      Other       NG/GT  195     IV Piggyback 844.5 2650      1413.4     Total Intake(mL/kg) 14800.9 (141.2) 6830.1 (95.9)     Urine (mL/kg/hr) 508 (0.3) 990 (0.6) 45 (0.4)    Drains 140 223 170    Other 2423 347     Stool       Total Output 3071 1560 215    Net +6985.9 +5270.1 -215                 Physical Exam   Constitutional: No distress.   HENT:   Head: Normocephalic and atraumatic.   Pulmonary/Chest:   Mechanically ventilated 40%/5 of PEEP   Abdominal:   Abdomen soft, midline incision with prevena vac, pelvic drain serous  RLQ former incision abscess site clean   Neurological:   Arouses with stimulation   Skin: Skin is warm and dry. She is not diaphoretic.       Significant Labs:  CBC:   Recent Labs   Lab 01/28/19 0315   WBC 8.81   RBC 2.87*   HGB 8.1*   HCT 22.3*   PLT 42*   MCV 78*   MCH 28.2   MCHC 36.3*     BMP:   Recent Labs   Lab 01/28/19  0315 01/28/19  0530   *  --      --    K 3.3* 3.9     --    CO2 24  --    BUN 5*  --    CREATININE 0.6  --    CALCIUM 7.9*  --    MG 2.3  --      ABGs:   Recent Labs   Lab 01/28/19  0017   PH 7.392   PCO2 43.6   PO2 120*   HCO3 26.5   POCSATURATED 99   BE 2     Lactate 3.9      Assessment/Plan:     Depression    Hold home meds     Hypophosphatemia    Replace prn     Debility    PT/OT when extubated     Hypokalemia    Replace prn     Generalized abdominal pain    64 yo female presenting with abdominal pain, nausea, elevated lactate s/p open drainage pelvic abscess 1/25 now with severe sepsis, lactic acidosis    -cont broad spec abx, f/u cultures- NGTD besides blood cx from  admit staph x2  -acidosis resolved, lactate still mildly elevated cont to trend     Severe malnutrition    -cont TPN     Essential hypertension    Hold home meds for now     Gastroesophageal reflux disease without esophagitis    ppi     (HFpEF) heart failure with preserved ejection fraction    Last echo 8/2018 with no WMAs, grade 1DD, EF 60  - strict I/O, daily weights     Moderate asthma without complication    -currently intubated, resume home meds when extubated     CAD (coronary artery disease)    - cards consulted for EKG changes, mild elevation in troponin, type II MI, on ASA/clopidogrel       Insulin dependent diabetes mellitus    SSI       -overall improved, off levo, wean vaso to off  -wean to extubate today    Sierra Real MD  General Surgery  [facility]

## 2019-01-28 NOTE — PROGRESS NOTES
[facility]  Critical Care - Surgery  Progress Note    Patient Name: Sheryl Martines  MRN: [id number]  Admission Date: 1/23/2019  Hospital Length of Stay: 4 days  Code Status: Full Code  Attending Provider: Monster Laurent MD  Primary Care Provider: Primary Doctor No   Principal Problem: Pelvic abscess in female    Subjective:     Hospital/ICU Course:  No notes on file    Interval History/Significant Events: Overnight, 2L IVF bolus. Hyperglycemia, on insulin gtt, endocrine following. Peaked in the ~500s, now in the ~200s. Lactate trended down, now trending back up, last 3.9. Minimal vent settings. On/off vaso, off levo. Urine output ~20cc/hr, 960 Uop 24 hours. On vanc/zosyn. TPN held for hyperglycemia. Hold heparin for thrombocytopenia.    Follow-up For: Procedure(s) (LRB):  LAPAROTOMY, EXPLORATORY (N/A)  INCISION AND DRAINAGE, ABSCESS-  drainage of pelvic abscess (N/A)  EXCISION, ABSCESS- drainage abdominal wall abscess (N/A)  APPLICATION, WOUND VAC (N/A)    Post-Operative Day: 3 Days Post-Op    Objective:     Vital Signs (Most Recent):  Temp: 97.8 °F (36.6 °C) (01/28/19 0730)  Pulse: 92 (01/28/19 0730)  Resp: 15 (01/28/19 0730)  BP: 117/65 (01/28/19 0700)  SpO2: 100 % (01/28/19 0730) Vital Signs (24h Range):  Temp:  [97.8 °F (36.6 °C)-98.4 °F (36.9 °C)] 97.8 °F (36.6 °C)  Pulse:  [63-97] 92  Resp:  [11-20] 15  SpO2:  [97 %-100 %] 100 %  BP: ()/(54-70) 117/65  Arterial Line BP: ()/() 146/73     Weight: 71.2 kg (157 lb)  Body mass index is 29.66 kg/m².      Intake/Output Summary (Last 24 hours) at 1/28/2019 0746  Last data filed at 1/28/2019 0700  Gross per 24 hour   Intake 6830.14 ml   Output 1311 ml   Net 5519.14 ml       Physical Exam   Constitutional: She appears well-developed and well-nourished. No distress.   HENT:   Head: Normocephalic.   Eyes: Pupils are equal, round, and reactive to light.   Cardiovascular: Regular rhythm.   Tachycardic   Pulmonary/Chest: No  respiratory distress. She has no wheezes.   Intubated, mechanical breath sounds   Abdominal: Soft. She exhibits no distension.   Neurological:   lethargic   Skin: Skin is warm and dry.       Vents:  Vent Mode: A/C (01/28/19 0500)  Ventilator Initiated: Yes (01/26/19 0239)  Set Rate: 15 bmp (01/28/19 0500)  Vt Set: 350 mL (01/28/19 0500)  PEEP/CPAP: 5 cmH20 (01/28/19 0500)  Oxygen Concentration (%): 40 (01/28/19 0730)  Peak Airway Pressure: 22 cmH2O (01/28/19 0500)  Plateau Pressure: 5 cmH20 (01/28/19 0500)  Total Ve: 5.62 mL (01/28/19 0500)  F/VT Ratio<105 (RSBI): (!) 37.13 (01/28/19 0500)    Lines/Drains/Airways     Central Venous Catheter Line                 Port A Cath Single Lumen 01/23/19 1400 right subclavian 4 days         Percutaneous Central Line Insertion/Assessment - triple lumen  01/26/19 0010 left internal jugular 2 days         Trialysis (Dialysis) Catheter 01/26/19 1200 right internal jugular 1 day          Drain                 Closed/Suction Drain 01/25/19 1020 Right;Ventral Abdomen Bulb 19 Fr. 2 days         Urethral Catheter 01/25/19 0934 Straight-tip;Non-latex 16 Fr. 2 days         NG/OG Tube 01/26/19 1200 Center mouth 1 day          Airway                 Airway - Non-Surgical 01/26/19 Endotracheal Tube 2 days          Arterial Line                 Arterial Line 01/25/19 1049 Left Radial 2 days          Pressure Ulcer                 Negative Pressure Wound Therapy  2 days                Significant Labs:    CBC/Anemia Profile:  Recent Labs   Lab 01/27/19  0330 01/28/19  0315   WBC 7.62 8.81   HGB 9.3* 8.1*   HCT 26.5* 22.3*   PLT 59* 42*   MCV 77* 78*   RDW 17.2* 18.6*        Chemistries:  Recent Labs   Lab 01/27/19  1551 01/27/19 2005 01/28/19  0015 01/28/19  0145 01/28/19  0315 01/28/19  0530   * 136 136  --  136  --    K 4.1 3.6 3.6 3.5 3.3* 3.9    104 103  --  104  --    CO2 22* 24 25  --  24  --    BUN 4* 5* 5*  --  5*  --    CREATININE 0.6 0.6 0.6  --  0.6  --    CALCIUM  6.6* 7.1* 7.3*  --  7.9*  --    ALBUMIN 1.0*  --  1.6*  --  1.7*  --    PROT 2.8*  --  3.6*  --  3.8*  --    BILITOT 1.7*  --  2.4*  --  2.7*  --    ALKPHOS 232*  --  263*  --  307*  --    ALT 9*  --  13  --  16  --    AST 24  --  48*  --  60*  --    MG 2.3  --  2.2  --  2.3  --    PHOS 2.0*  --  2.3*  --  1.7*  --        All pertinent labs within the past 24 hours have been reviewed.    Significant Imaging:  I have reviewed all pertinent imaging results/findings within the past 24 hours.    Assessment/Plan:     * Pelvic abscess in female    63 y.o. female with hx of asthma, COPD, CAD, DM, HTN, CVA  with R side deficits, HFpEF, severe debility, lap converted to open appendectomy 2018 complicated by cdiff, failure to thrive.  Hx of difficult intubation and delayed emergence, CO2 narcosis needing ICU stay.  Hx of PRN O2 at night. Patient now s/p exploratory laparotomy, washout, and pelvic abscess drainage on 19.    Post-operatively, on initial evaluation in PACU, patient hypotensive to 60s/40s, on BiPap. Currently fluid resuscitating with 3rd liter of LR, responding well to phenylephrine, starting phenylephrine infusion (tachycardic to ~118). ABG 7.11/49/116/16, BE -14, on bipap 14/5, 35% FiO2. Intubated on  for airway protection. Required CRRT on  for metabolic acidosis, now off.     Neuro:  Sedation: Prop gtt @ 10, fentanyl gtt @ 100  Pain: Fentanyl PRN     CV:  - decreasing pressor requirements - levo off, vaso .04 on/off  - wean pressors as able  - Lactate 6.0 -> 4.2 -> 3.9 -> 3.3 -> 3.6 -> 3.9    Pulm:   - Intubated, minimal vent settings  - Latest AB.39/43/120/26 on 40%/5  - serial ABGs  - daily CXR  Vent Mode: A/C  Oxygen Concentration (%):  [39-40] 40  Resp Rate Total:  [15 br/min-23 br/min] 20 br/min  Vt Set:  [350 mL] 350 mL  PEEP/CPAP:  [5 cmH20] 5 cmH20  Mean Airway Pressure:  [8.3 cmH20-9.7 cmH20] 9.7 cmH20      Renal:  Trend BUN/Cr: <2/0.5  Monitor Urine output - 960 Uop 24  hours  Nephro following, appreciate recs    ID:  AF  1/24 blood cultures: gram positive cocci in clusters resembling staph  1/25 blood cultures: NGTD  1/25 operative cultures pending, ngtd thus far  - on vanc/zosyn      FEN/GI:  TPN @ 75, on hold  Replace lytes PRN, aggressive repletion overnight, q8 labs  ppi  Lactate 3.9, continue to trend, q4h labs      Endo:  Patient with IDDM2, on detemir 20u BID at home  Overnight hyperglycemia, on insulin gtt, endocrine following    Heme:  Trend H&H : 9.3/27  Post-op hgb 8.5  Plt 42, holding heparin, HIT score 3 low probability    Dispo: Cont ICU care   Primary: SICU/general surgery            Critical care was time spent personally by me on the following activities: development of treatment plan with patient or surrogate and bedside caregivers, discussions with consultants, evaluation of patient's response to treatment, examination of patient, ordering and performing treatments and interventions, ordering and review of laboratory studies, ordering and review of radiographic studies, pulse oximetry, re-evaluation of patient's condition.  This critical care time did not overlap with that of any other provider or involve time for any procedures.     Price Mackay MD  Critical Care - Surgery  [facility]

## 2019-01-28 NOTE — SUBJECTIVE & OBJECTIVE
Interval History: Off CRRT since yesterday am.  ml total. PH normalized, lactate 3.9 this am received 1L bolus.     Medications:  Continuous Infusions:   dexmedetomidine (PRECEDEX) infusion Stopped (01/26/19 1300)    fentanyl 75 mcg/hr (01/28/19 0800)    insulin (HUMAN R) infusion (adults) 99 Units/hr (01/28/19 0800)    norepinephrine bitartrate-D5W Stopped (01/27/19 1932)    propofol 10 mcg/kg/min (01/28/19 0800)    TPN ADULT CENTRAL LINE CUSTOM Stopped (01/27/19 2308)    vasopressin (PITRESSIN) infusion 0.02 Units/min (01/28/19 0800)     Scheduled Meds:   albuterol-ipratropium  3 mL Nebulization Q6H    aspirin  81 mg Per NG tube Daily    clopidogrel 5mg/mL  75 mg Per NG tube Daily    fat emulsion 20%  250 mL Intravenous Daily    fluticasone-vilanterol  1 puff Inhalation Daily    hydrocortisone sodium succinate  100 mg Intravenous Q8H    pantoprozole (PROTONIX) IV  40 mg Intravenous Q12H    piperacillin-tazobactam (ZOSYN) IVPB  4.5 g Intravenous Q8H    potassium chloride  50 mEq Oral Once    potassium chloride  50 mEq Oral Once    potassium phosphate IVPB  30 mmol Intravenous Once    pyridoxine (vitamin B6)  50 mg Oral Daily    scopolamine  1 patch Transdermal Q3 Days    theophylline  300 mg Oral Daily    vancomycin (VANCOCIN) IVPB  1,000 mg Intravenous Q12H     PRN Meds:sodium chloride, albuterol sulfate, dextrose 50 % in water (D50W), dextrose 50 % in water (D50W), dextrose 50%, dextrose 50%, diclofenac sodium, fentaNYL, glucagon (human recombinant), glucose, glucose, ondansetron, promethazine (PHENERGAN) IVPB, sodium chloride 0.9%, sodium chloride 0.9%     Review of patient's allergies indicates:   Allergen Reactions    Ace inhibitors Swelling    Carvedilol      Other reaction(s): Other (See Comments)  blister    Hydralazine analogues     Metformin Nausea And Vomiting    Tetracycline Itching    Tetracyclines Swelling    Travatan (with benzalkonium) [travoprost (benzalkonium)]      Travoprost Itching     Other reaction(s): Other (See Comments)  Blurry vision     Objective:     Vital Signs (Most Recent):  Temp: 97.8 °F (36.6 °C) (01/28/19 0730)  Pulse: 85 (01/28/19 0816)  Resp: 20 (01/28/19 0816)  BP: (!) 101/56 (01/28/19 0800)  SpO2: 99 % (01/28/19 0816) Vital Signs (24h Range):  Temp:  [97.8 °F (36.6 °C)-98.4 °F (36.9 °C)] 97.8 °F (36.6 °C)  Pulse:  [63-97] 85  Resp:  [11-20] 20  SpO2:  [97 %-100 %] 99 %  BP: ()/(54-70) 101/56  Arterial Line BP: ()/() 94/54     Weight: 71.2 kg (157 lb)  Body mass index is 29.66 kg/m².    Intake/Output - Last 3 Shifts       01/26 0700 - 01/27 0659 01/27 0700 - 01/28 0659 01/28 0700 - 01/29 0659    I.V. (mL/kg) 8192.4 (115.1) 2571.8 (36.1)     Blood 350      Other       NG/GT  195     IV Piggyback 844.5 2650      1413.4     Total Intake(mL/kg) 18057.9 (141.2) 6830.1 (95.9)     Urine (mL/kg/hr) 508 (0.3) 990 (0.6) 45 (0.4)    Drains 140 223 170    Other 2423 347     Stool       Total Output 3071 1560 215    Net +6985.9 +5270.1 -215                 Physical Exam   Constitutional: No distress.   HENT:   Head: Normocephalic and atraumatic.   Pulmonary/Chest:   Mechanically ventilated 40%/5 of PEEP   Abdominal:   Abdomen soft, midline incision with prevena vac, pelvic drain serous  RLQ former incision abscess site clean   Neurological:   Arouses with stimulation   Skin: Skin is warm and dry. She is not diaphoretic.       Significant Labs:  CBC:   Recent Labs   Lab 01/28/19  0315   WBC 8.81   RBC 2.87*   HGB 8.1*   HCT 22.3*   PLT 42*   MCV 78*   MCH 28.2   MCHC 36.3*     BMP:   Recent Labs   Lab 01/28/19  0315 01/28/19  0530   *  --      --    K 3.3* 3.9     --    CO2 24  --    BUN 5*  --    CREATININE 0.6  --    CALCIUM 7.9*  --    MG 2.3  --      ABGs:   Recent Labs   Lab 01/28/19  0017   PH 7.392   PCO2 43.6   PO2 120*   HCO3 26.5   POCSATURATED 99   BE 2     Lactate 3.9

## 2019-01-28 NOTE — NURSING
Paged endocrinology + SICU team regarding persistent hyperglycemia (>400) >6hrs, unresponsive to increasing rates of insulin gtt (current rate 42u/hr).    Clarifying potential max dose limit for insulin gtt with endocrine team while following protocol, verifying if doubling current dose to a total of 84u/hr is appropriate.     Decision made to stop TPN at this time, maintain insulin at 42u/hr; reassess BG in 30 minutes to monitor for rebound hypoglycemia.     Will continue to monitor, confirmed plan with SICU team, see MAR for administration details.

## 2019-01-28 NOTE — ASSESSMENT & PLAN NOTE
62 yo female presenting with abdominal pain, nausea, elevated lactate s/p open drainage pelvic abscess 1/25 now with severe sepsis, lactic acidosis    -cont broad spec abx, f/u cultures- NGTD besides blood cx from admit staph x2  -acidosis resolved, lactate still mildly elevated cont to trend

## 2019-01-28 NOTE — PLAN OF CARE
"Patient transferred to Dr. Laurent service on 1/25/19 s/p surgery. ICU nurse at  orally suctioning patient. Daughter, Lanette, at her BS. CM informed Lanette of CM & SW's roles-will assist patient w/discharge needs, w/PT/OT recs pending. She verbalized her understanding & states, "We would like her to go to [facility] SNF at discharge." CM assured her we will be following her mother & keep in touch w/them.      [facility] given to patient's daughter after informed about it;patient's daughter verbalized her understanding.          01/28/19 1150   Discharge Reassessment   Assessment Type Discharge Planning Reassessment   Provided patient/caregiver education on the expected discharge date and the discharge plan No  (D/c date per MD. Not medically stable. In the ICU, POD # 3, w/PT/OT recs pending. Daughter, Lanette, from [place], [facility] [place] [facility]\"" family would like [facility] at discharge. (She is current w/[facility].))   Do you have any problems affording any of your prescribed medications? No   Discharge Plan A Skilled Nursing Facility   Discharge Plan B Skilled Nursing Facility   Anticipated Discharge Disposition SNF     "

## 2019-01-28 NOTE — SUBJECTIVE & OBJECTIVE
Interval HPI:   Overnight events:   Is now in SICU on IIP. Bg is trending downward requiring frequent rapid rate reduction.    Eating:   NPO  Nausea: No  Hypoglycemia and intervention: Yes - treated with D50  Fever: No  TPN and/or TF: No  If yes, type of TF/TPN and rate: None    PMH, PSH, FH, SH reviewed     Review of Systems  Constitutional: Negative for weight changes.  Eyes: Negative for visual disturbance.  Respiratory: Negative for cough.   Cardiovascular: Negative for chest pain.  Gastrointestinal: Negative for nausea.  Endocrine: Negative for polyuria, polydipsia.  Musculoskeletal: Negative for back pain.  Skin: Negative for rash.  Neurological: Negative for syncope.  Psychiatric/Behavioral: Negative for depression.      Current Medications and/or Treatments Impacting Glycemic Control  Immunotherapy:    Immunosuppressants     None        Steroids:   Hormones (From admission, onward)    Start     Stop Route Frequency Ordered    01/28/19 1400  hydrocortisone sodium succinate injection 50 mg      -- IV Every 8 hours 01/28/19 0905        Pressors:    Autonomic Drugs (From admission, onward)    None        Hyperglycemia/Diabetes Medications:   Antihyperglycemics (From admission, onward)    Start     Stop Route Frequency Ordered    01/27/19 1400  insulin regular (Humulin R) 100 Units in sodium chloride 0.9% 100 mL infusion     Question:  Insulin Rate Adjustment (DO NOT MODIFY ANSWER)  Answer:  [email or web address]    -- IV Continuous 01/27/19 1254             PHYSICAL EXAMINATION:  Vitals:    01/28/19 1100   BP: (!) 166/75   Pulse: (!) 120   Resp: (!) 23   Temp:      Body mass index is 29.66 kg/m².    Physical Exam   Constitutional: Well developed, well nourished, NAD.  ENT: External ears no masses with nose patent; normal hearing.  Neck: Supple; trachea midline.  Cardiovascular: Normal heart sounds, LE edema 2+ noted. DP +1 bilaterally.  Lungs: Normal  effort; lungs anterior bilaterally clear to auscultation.  Abdomen: Distended, no masses, no hernias.  MS: No clubbing or cyanosis of nails noted; unable to assess gait.  Skin: No rashes, lesions, or ulcers; no nodules. Wound drains noted to left abdomen. No dressings are intact.   Psychiatric: Good judgement and insight; normal mood and affect.  Neurological: Cranial nerves are grossly intact.   Foot: Nails in good condition, no amputations noted.

## 2019-01-28 NOTE — TELEPHONE ENCOUNTER
Home Health SOC 01/18/2019 - 03/18/2019 with [facility] - Dr. Aleks Jewell. Patient received SN and PT services.

## 2019-01-28 NOTE — CARE UPDATE
I was called at 8:24 PM for consult of hyperglycemia. Chart reviewed, hyperglycemia since TPN initiation. I  discussed with her nurse. Insulin gtt running in central line.   Pt with infection, on IV steroid, calcium gluconate pushes and high rate TPN all causing hyperglycemia    I recommend continue intensive insulin protocol, please follow it, Q1 POCT glucose  Can consider decreasing TPN rate by 50% for now to help with glucose  Recommend using IV calcium chloride over calcium gluconate to help with glucose. Recommend trending Ionized calcium.     Discussed with nursing    Full consult note to follow tomorrow    En Gallagher MD  Endocrine Fellow      11:14 PM  Nursing called still with concern for large insulin requirement  I recommend   1) Using a new line for insulin drip ie peripheral line or another line  2) recommend checking different fingers to correlate glucose  3) recommend either holding TPN completely or decrease the rate to 35ml/hr to help with glucose  4) Discuss with nephro about calcium   5) Reconsult nutrition for re-eval of TPN    Nurse will call SICU resident  At this time Endocrine has no further recommendations beside above    En Gallagher MD  Endocrine Fellow  11:18 PM

## 2019-01-28 NOTE — CONSULTS
[facility]  Endocrinology  Diabetes Consult Note    Consult Requested by: Monster Laurent MD   Reason for admit: Pelvic abscess in female    HISTORY OF PRESENT ILLNESS:  Reason for Consult: Management of T2DM, Hyperglycemia     Surgical Procedure and Date:      LAPAROTOMY, EXPLORATORY (N/A)     INCISION AND DRAINAGE, ABSCESS-  drainage of pelvic abscess (N/A)     EXCISION, ABSCESS- drainage abdominal wall abscess (N/A)     APPLICATION, WOUND VAC (N/A)       Diabetes diagnosis year: 20 years ago    Home Diabetes Medications:       Levemir 20 units BID     Humalog 16 units TIDWM with correction     How often checking glucose at home? >4 x day   BG readings on regimen: 110's  Hypoglycemia on the regimen?  Yes - 40's  Missed doses on regimen?  No    Diabetes Complications include:     Hypoglycemia     Complicating diabetes co morbidities:   HLD, CVA, and CAD    Lab Results   Component Value Date    HGBA1C 4.7 01/24/2019       HPI:   Patient is a 63 y.o. female with a diagnosis of sepsis and lactic acidosis secondary to abdominal abscess. Also has diagnosis of COPD, asthma, CAD, CVA (2012), and DM2. Patient receives primary care for DM in [place]. Her primary care giver is her daughter. Patient's DM is well controlled by primary Endocrinologist in [place]. Patient has not been on insulin regimen for the past 3 weeks leading up to hospitalization. According to daughter, patient was not eating, and having low BG reading. Therefore, insulin regimen was stopped by patient's primary Endocrinologist. Endocrinology at [facility] consulted to manage DM/hyperglycemia.             Interval HPI:   Overnight events:   Is now in SICU on IIP. Bg is trending downward requiring frequent rapid rate reduction.    Eating:   NPO  Nausea: No  Hypoglycemia and intervention: Yes - treated with D50  Fever: No  TPN and/or TF: No  If yes, type of TF/TPN and rate: None    PMH, PSH, FH, SH reviewed     Review of  Systems  Constitutional: Negative for weight changes.  Eyes: Negative for visual disturbance.  Respiratory: Negative for cough.   Cardiovascular: Negative for chest pain.  Gastrointestinal: Negative for nausea.  Endocrine: Negative for polyuria, polydipsia.  Musculoskeletal: Negative for back pain.  Skin: Negative for rash.  Neurological: Negative for syncope.  Psychiatric/Behavioral: Negative for depression.      Current Medications and/or Treatments Impacting Glycemic Control  Immunotherapy:    Immunosuppressants     None        Steroids:   Hormones (From admission, onward)    Start     Stop Route Frequency Ordered    01/28/19 1400  hydrocortisone sodium succinate injection 50 mg      -- IV Every 8 hours 01/28/19 0905        Pressors:    Autonomic Drugs (From admission, onward)    None        Hyperglycemia/Diabetes Medications:   Antihyperglycemics (From admission, onward)    Start     Stop Route Frequency Ordered    01/27/19 1400  insulin regular (Humulin R) 100 Units in sodium chloride 0.9% 100 mL infusion     Question:  Insulin Rate Adjustment (DO NOT MODIFY ANSWER)  Answer:  [email or web address] [id number]    -- IV Continuous 01/27/19 1254             PHYSICAL EXAMINATION:  Vitals:    01/28/19 1100   BP: (!) 166/75   Pulse: (!) 120   Resp: (!) 23   Temp:      Body mass index is 29.66 kg/m².    Physical Exam   Constitutional: Well developed, well nourished, NAD.  ENT: External ears no masses with nose patent; normal hearing.  Neck: Supple; trachea midline.  Cardiovascular: Normal heart sounds, LE edema 2+ noted. DP +1 bilaterally.  Lungs: Normal effort; lungs anterior bilaterally clear to auscultation.  Abdomen: Distended, no masses, no hernias.  MS: No clubbing or cyanosis of nails noted; unable to assess gait.  Skin: No rashes, lesions, or ulcers; no nodules. Wound drains noted to left abdomen. No dressings are intact.   Psychiatric: Good judgement and  insight; normal mood and affect.  Neurological: Cranial nerves are grossly intact.   Foot: Nails in good condition, no amputations noted.         Labs Reviewed and Include   Recent Labs   Lab 01/28/19  0805   *   CALCIUM 7.6*   ALBUMIN 1.6*   PROT 3.4*   *   K 3.6   CO2 23      BUN 6*   CREATININE 0.6   ALKPHOS 342*   ALT 21   AST 70*   BILITOT 2.8*     Lab Results   Component Value Date    WBC 8.81 01/28/2019    HGB 8.1 (L) 01/28/2019    HCT 22.3 (L) 01/28/2019    MCV 78 (L) 01/28/2019    PLT 42 (L) 01/28/2019     No results for input(s): TSH, FREET4 in the last 168 hours.  Lab Results   Component Value Date    HGBA1C 4.7 01/24/2019       Nutritional status:   Body mass index is 29.66 kg/m².  Lab Results   Component Value Date    ALBUMIN 1.6 (L) 01/28/2019    ALBUMIN 1.7 (L) 01/28/2019    ALBUMIN 1.6 (L) 01/28/2019     Lab Results   Component Value Date    PREALBUMIN 3 (L) 01/28/2019    PREALBUMIN 5 (L) 01/24/2019    PREALBUMIN 20 10/05/2018       Estimated Creatinine Clearance: 86.7 mL/min (based on SCr of 0.6 mg/dL).    Accu-Checks  Recent Labs     01/28/19  0811 01/28/19  0901 01/28/19  1001 01/28/19  1102 01/28/19  1156 01/28/19  1246 01/28/19  1354 01/28/19  1428 01/28/19  1431 01/28/19  1457   POCTGLUCOSE 229* 204* 166* 153* 144* 109 75 60* 58* 68*        ASSESSMENT and PLAN    * Pelvic abscess in female    Managed per primary team  Avoid hypoglycemia  Optimize BG control to improve wound healing         Insulin dependent diabetes mellitus    BG goal 140 - 180     Continue IV insulin infusion protocol  Requires intensive BG monitoring while on protocol     Discharge planning: TBD         Adverse effect of adrenal cortical steroids, sequela    On steroid therapy per primary team; may elevate BG readings         CAD (coronary artery disease)    Managed per primary team  Condition may cause insulin resistance          Overweight (BMI 25.0-29.9)    Body mass index is 29.66 kg/m².  may  contribute to insulin resistance         NIKHIL (acute kidney injury)      Caution with insulin stacking           Plan discussed with patient, family, and RN at bedside.     Fredrick Ruiz NP  Endocrinology  [facility]

## 2019-01-28 NOTE — SUBJECTIVE & OBJECTIVE
Interval History: SLED stopped yesterday. Patient net positive 5.5 L/24h. UOP in the past 24 hours increased to 990 ml. On Levo and Vaso for BP support.  Patient extubated today. Alert and following direct commands.   Acidosis resolved with RRT  sCr 0.6  CVP 7    Review of patient's allergies indicates:   Allergen Reactions    Ace inhibitors Swelling    Carvedilol      Other reaction(s): Other (See Comments)  blister    Hydralazine analogues     Metformin Nausea And Vomiting    Tetracycline Itching    Tetracyclines Swelling    Travatan (with benzalkonium) [travoprost (benzalkonium)]     Travoprost Itching     Other reaction(s): Other (See Comments)  Blurry vision     Current Facility-Administered Medications   Medication Frequency    0.9%  NaCl infusion (for blood administration) Q24H PRN    albuterol sulfate nebulizer solution 2.5 mg Q4H PRN    albuterol-ipratropium 2.5 mg-0.5 mg/3 mL nebulizer solution 3 mL Q6H    aspirin chewable tablet 81 mg Daily    [START ON 1/29/2019] clopidogrel tablet 75 mg Daily    dexmedetomidine (PRECEDEX) 400mcg/100mL 0.9% NaCL infusion Continuous    dextrose 50 % in water (D50W) injection 12.5 g PRN    dextrose 50 % in water (D50W) injection 25 g PRN    dextrose 50% injection 12.5 g PRN    dextrose 50% injection 25 g PRN    diclofenac sodium 1 % gel 2 g Daily PRN    [START ON 1/29/2019] fluticasone-vilanterol 100-25 mcg/dose diskus inhaler 1 puff Daily    glucagon (human recombinant) injection 1 mg PRN    glucose chewable tablet 16 g PRN    glucose chewable tablet 24 g PRN    hydrocortisone sodium succinate injection 50 mg Q8H    HYDROmorphone injection 0.5 mg Q2H PRN    insulin regular (Humulin R) 100 Units in sodium chloride 0.9% 100 mL infusion Continuous    ondansetron injection 4 mg Q8H PRN    pantoprazole injection 40 mg Q12H    piperacillin-tazobactam 4.5 g in sodium chloride 0.9% 100 mL IVPB (ready to mix system) Q8H    promethazine (PHENERGAN)  6.25 mg in dextrose 5 % 50 mL IVPB Q6H PRN    pyridoxine (vitamin B6) tablet 50 mg Daily    scopolamine 1.3-1.5 mg (1 mg over 3 days) 1 patch Q3 Days    sodium chloride 0.9% flush 5 mL PRN    sodium chloride 0.9% flush 5 mL PRN    sodium phosphate in 0.9 % NaCl 15 mmol/250 mL Soln 15 mmol Once    [START ON 1/29/2019] vancomycin in dextrose 5 % 1 gram/250 mL IVPB 1,000 mg Q24H       Objective:     Vital Signs (Most Recent):  Temp: 98.7 °F (37.1 °C) (01/28/19 1345)  Pulse: (!) 119 (01/28/19 1430)  Resp: 20 (01/28/19 1430)  BP: 103/67 (01/28/19 1430)  SpO2: 99 % (01/28/19 1430)  O2 Device (Oxygen Therapy): nasal cannula w/ humidification (01/28/19 1100) Vital Signs (24h Range):  Temp:  [97.8 °F (36.6 °C)-98.7 °F (37.1 °C)] 98.7 °F (37.1 °C)  Pulse:  [] 119  Resp:  [11-26] 20  SpO2:  [94 %-100 %] 99 %  BP: ()/(54-90) 103/67  Arterial Line BP: ()/() 91/75     Weight: 71.2 kg (157 lb) (01/26/19 0900)  Body mass index is 29.66 kg/m².  Body surface area is 1.75 meters squared.    I/O last 3 completed shifts:  In: 05533.1 [I.V.:5394.2; NG/GT:195; IV Piggyback:3494.5]  Out: 4007 [Urine:1368; Drains:253; Other:2386]    Physical Exam   Constitutional: She appears well-developed. She appears ill. No distress. She is sedated and intubated.   HENT:   Head: Normocephalic and atraumatic.   Right Ear: External ear normal.   Left Ear: External ear normal.   Eyes: Conjunctivae are normal. Right eye exhibits no discharge. Left eye exhibits no discharge.   Neck: Neck supple.   Cardiovascular: Normal rate and regular rhythm. Exam reveals no gallop and no friction rub.   No murmur heard.  Pulmonary/Chest: Breath sounds normal. She is intubated. She has no wheezes. She has no rales.   Abdominal:   LUCIANO drain/wound vac to RLQ   Musculoskeletal: She exhibits edema (upper extremities). She exhibits no deformity.   Skin: Skin is warm. She is not diaphoretic.       Significant Labs:  CBC:   Recent Labs   Lab  01/28/19  0315   WBC 8.81   RBC 2.87*   HGB 8.1*   HCT 22.3*   PLT 42*   MCV 78*   MCH 28.2   MCHC 36.3*     CMP:   Recent Labs   Lab 01/28/19  0805   *   CALCIUM 7.6*   ALBUMIN 1.6*   PROT 3.4*   *   K 3.6   CO2 23      BUN 6*   CREATININE 0.6   ALKPHOS 342*   ALT 21   AST 70*   BILITOT 2.8*

## 2019-01-28 NOTE — ASSESSMENT & PLAN NOTE
NIKHIL on normal renal function    62 yo AAM who presented with worsening nausea/vomiting for several months.  Imaging revealed possible abscess to past appendectomy site and she underwent exp/lap with abscess drainage on 1/25.  Post-op course complicated by hypotension and lactic acidosis.  She was volume resuscitated with 5L of crystalloids.  On the morning of 1/26, UOP has slowly trended down and now with anuria. SLED initiated on 1/26.    NIKHIL 2/2 Ischemic ATN vs. Septic ATN    Plan/recommendations:  -SLED discontinued on 1/27. No urgent need for dialysis at this time. LA remains elevated today (3.2->4.0). Metabolic acidosis resolved with SLED. ABG 7.36/43.1/25.2. sCr 0.6.  -continue strict I/O's  -urine output fluctuating, 10-15 cc/hr earlier, now 45-70 cc/hr after. 1 L bolus given this AM.   -CVP ranging 7-8, extubated this AM

## 2019-01-28 NOTE — PLAN OF CARE
Shift events:     Pt hyperglycemic ('s-551 from beginning of shift to ~0300) unresponsive to increasing insulin gtt rates; discussed with endocrine fellow multiple times for direction. See note for details.    TPN stopped 2300 pending correction in blood sugars.     Electrolyte monitoring and lactic acid trending Q4Hrs resulting in multiple replacements: 2L LR IV bolus; 80mEq KCL; 1 g CaCl, 30mmol sodium glycophos; 2 packets Kphos.     Assessment:     Pt remained intubated/sedated overnight on propofol and fentanyl; afebrile; following commands/appropriately interactive.     MV AC 40% 5 Peep, RR15-20, SpO2 >98%    Cardiac: 70-90's NSR, BP 90-130s/50-60's MAP >65 on vaso .04; levo off ~1930. CVP 4-11.     GI: Q1H BS, 270-550's, insulin gtt up to 94u/hr as of 0400 with treatment team aware. TPN paused 2300. Lipids continued overnight. No BMs overnight.     : Ritchie in place, 20-45cc/hr.     Skin: mid abd inc with woundvac stable, 25cc serosanguinous output/shift. LUCIANO drain RLQ, ~190cc serous output overnight. Copious moist drainage from RLQ inc site + blister above; blister drained + dressing changed by MDs 0600.    Plan of care reviewed with patient and family at bedside throughout shift, all questions answered. Please see flowsheet for full assessment data.

## 2019-01-28 NOTE — ASSESSMENT & PLAN NOTE
63 y.o. female with hx of asthma, COPD, CAD, DM, HTN, CVA  with R side deficits, HFpEF, severe debility, lap converted to open appendectomy 2018 complicated by cdiff, failure to thrive.  Hx of difficult intubation and delayed emergence, CO2 narcosis needing ICU stay.  Hx of PRN O2 at night. Patient now s/p exploratory laparotomy, washout, and pelvic abscess drainage on 19.    Post-operatively, on initial evaluation in PACU, patient hypotensive to 60s/40s, on BiPap. Currently fluid resuscitating with 3rd liter of LR, responding well to phenylephrine, starting phenylephrine infusion (tachycardic to ~118). ABG 7.11/49/116/16, BE -14, on bipap 14/5, 35% FiO2. Intubated on  for airway protection. Required CRRT on  for metabolic acidosis, now off.     Neuro:  Sedation: Prop gtt @ 10, fentanyl gtt @ 100  Pain: Fentanyl PRN     CV:  - decreasing pressor requirements - levo off, vaso .04 on/off  - wean pressors as able  - Lactate 6.0 -> 4.2 -> 3.9 -> 3.3 -> 3.6 -> 3.9    Pulm:   - Intubated, minimal vent settings  - Latest AB.39/43/120/26 on 40%/5  - serial ABGs  - daily CXR  Vent Mode: A/C  Oxygen Concentration (%):  [39-40] 40  Resp Rate Total:  [15 br/min-23 br/min] 20 br/min  Vt Set:  [350 mL] 350 mL  PEEP/CPAP:  [5 cmH20] 5 cmH20  Mean Airway Pressure:  [8.3 cmH20-9.7 cmH20] 9.7 cmH20      Renal:  Trend BUN/Cr: <2/0.5  Monitor Urine output - 960 Uop 24 hours  Nephro following, appreciate recs    ID:  AF   blood cultures: gram positive cocci in clusters resembling staph   blood cultures: NGTD   operative cultures pending, ngtd thus far  - on vanc/zosyn      FEN/GI:  TPN @ 75, on hold  Replace lytes PRN, aggressive repletion overnight, q8 labs  ppi  Lactate 3.9, continue to trend, q4h labs      Endo:  Patient with IDDM2, on detemir 20u BID at home  Overnight hyperglycemia, on insulin gtt, endocrine following    Heme:  Trend H&H : 9.3/27  Post-op hgb 8.5  Plt 42, holding heparin, HIT  score 3 low probability    Dispo: Cont ICU care   Primary: SICU/general surgery

## 2019-01-28 NOTE — ASSESSMENT & PLAN NOTE
Contributing Nutrition Diagnosis  Malnutrition in the context of Acute Illness/Injury    Related to (etiology):  Decreased PO intake 2/2 abdominal pain and N/V    Signs and Symptoms (as evidenced by):  Energy Intake: <75% of estimated energy requirement for several months per patient  Body Fat Depletion: mild depletion of orbitals and triceps   Muscle Mass Depletion: mild and moderate depletion of temples, clavicle region and lower extremities   Weight Loss: 12.7% x 5 months   Fluid Accumulation: moderate    Interventions/Recommendations (treatment strategy):  Collaboration and referral of nutrition care     Nutrition Diagnosis Status:  Continues

## 2019-01-29 PROBLEM — T14.8XXA MULTIPLE SKIN TEARS: Status: ACTIVE | Noted: 2019-01-29

## 2019-01-29 PROBLEM — R23.9 ALTERATION IN SKIN INTEGRITY RELATED TO SURGICAL INCISION: Status: ACTIVE | Noted: 2019-01-29

## 2019-01-29 LAB
ALBUMIN SERPL BCP-MCNC: 1.4 G/DL
ALBUMIN SERPL BCP-MCNC: 1.6 G/DL
ALBUMIN SERPL BCP-MCNC: 1.7 G/DL
ALLENS TEST: ABNORMAL
ALP SERPL-CCNC: 318 U/L
ALP SERPL-CCNC: 338 U/L
ALP SERPL-CCNC: 396 U/L
ALT SERPL W/O P-5'-P-CCNC: 42 U/L
ALT SERPL W/O P-5'-P-CCNC: 46 U/L
ALT SERPL W/O P-5'-P-CCNC: 48 U/L
ANION GAP SERPL CALC-SCNC: 5 MMOL/L
ANION GAP SERPL CALC-SCNC: 6 MMOL/L
ANION GAP SERPL CALC-SCNC: 6 MMOL/L
ANISOCYTOSIS BLD QL SMEAR: SLIGHT
AST SERPL-CCNC: 108 U/L
AST SERPL-CCNC: 117 U/L
AST SERPL-CCNC: 88 U/L
BASOPHILS # BLD AUTO: 0 K/UL
BASOPHILS # BLD AUTO: 0.01 K/UL
BASOPHILS # BLD AUTO: 0.02 K/UL
BASOPHILS NFR BLD: 0 %
BASOPHILS NFR BLD: 0.1 %
BASOPHILS NFR BLD: 0.1 %
BILIRUB SERPL-MCNC: 2.6 MG/DL
BILIRUB SERPL-MCNC: 2.7 MG/DL
BILIRUB SERPL-MCNC: 2.9 MG/DL
BLD PROD TYP BPU: NORMAL
BLD PROD TYP BPU: NORMAL
BLOOD UNIT EXPIRATION DATE: NORMAL
BLOOD UNIT EXPIRATION DATE: NORMAL
BLOOD UNIT TYPE CODE: 6200
BLOOD UNIT TYPE CODE: 6200
BLOOD UNIT TYPE: NORMAL
BLOOD UNIT TYPE: NORMAL
BUN SERPL-MCNC: 6 MG/DL
BURR CELLS BLD QL SMEAR: ABNORMAL
CA-I BLDV-SCNC: 1.05 MMOL/L
CA-I BLDV-SCNC: 1.1 MMOL/L
CA-I BLDV-SCNC: 1.11 MMOL/L
CALCIUM SERPL-MCNC: 7.2 MG/DL
CALCIUM SERPL-MCNC: 7.5 MG/DL
CALCIUM SERPL-MCNC: 7.6 MG/DL
CHLORIDE SERPL-SCNC: 103 MMOL/L
CHLORIDE SERPL-SCNC: 103 MMOL/L
CHLORIDE SERPL-SCNC: 105 MMOL/L
CO2 SERPL-SCNC: 26 MMOL/L
CO2 SERPL-SCNC: 26 MMOL/L
CO2 SERPL-SCNC: 27 MMOL/L
CODING SYSTEM: NORMAL
CODING SYSTEM: NORMAL
CREAT SERPL-MCNC: 0.6 MG/DL
DELSYS: ABNORMAL
DIFFERENTIAL METHOD: ABNORMAL
DISPENSE STATUS: NORMAL
DISPENSE STATUS: NORMAL
EOSINOPHIL # BLD AUTO: 0 K/UL
EOSINOPHIL NFR BLD: 0 %
EOSINOPHIL NFR BLD: 0 %
EOSINOPHIL NFR BLD: 0.1 %
ERYTHROCYTE [DISTWIDTH] IN BLOOD BY AUTOMATED COUNT: 18.5 %
ERYTHROCYTE [DISTWIDTH] IN BLOOD BY AUTOMATED COUNT: 18.5 %
ERYTHROCYTE [DISTWIDTH] IN BLOOD BY AUTOMATED COUNT: 18.9 %
EST. GFR  (AFRICAN AMERICAN): >60 ML/MIN/1.73 M^2
EST. GFR  (NON AFRICAN AMERICAN): >60 ML/MIN/1.73 M^2
GLUCOSE SERPL-MCNC: 116 MG/DL
GLUCOSE SERPL-MCNC: 74 MG/DL
GLUCOSE SERPL-MCNC: 89 MG/DL
HCO3 UR-SCNC: 24.8 MMOL/L (ref 24–28)
HCT VFR BLD AUTO: 20.6 %
HCT VFR BLD AUTO: 23.4 %
HCT VFR BLD AUTO: 24.3 %
HGB BLD-MCNC: 7.2 G/DL
HGB BLD-MCNC: 8 G/DL
HGB BLD-MCNC: 8.3 G/DL
HYPOCHROMIA BLD QL SMEAR: ABNORMAL
IMM GRANULOCYTES # BLD AUTO: 0.03 K/UL
IMM GRANULOCYTES # BLD AUTO: 0.06 K/UL
IMM GRANULOCYTES # BLD AUTO: 0.07 K/UL
IMM GRANULOCYTES NFR BLD AUTO: 0.3 %
IMM GRANULOCYTES NFR BLD AUTO: 0.5 %
IMM GRANULOCYTES NFR BLD AUTO: 0.5 %
LACTATE SERPL-SCNC: 1.8 MMOL/L
LACTATE SERPL-SCNC: 2.1 MMOL/L
LACTATE SERPL-SCNC: 2.2 MMOL/L
LACTATE SERPL-SCNC: 2.6 MMOL/L
LYMPHOCYTES # BLD AUTO: 0.6 K/UL
LYMPHOCYTES # BLD AUTO: 0.7 K/UL
LYMPHOCYTES # BLD AUTO: 0.8 K/UL
LYMPHOCYTES NFR BLD: 5.4 %
LYMPHOCYTES NFR BLD: 5.4 %
LYMPHOCYTES NFR BLD: 5.6 %
MAGNESIUM SERPL-MCNC: 1.9 MG/DL
MAGNESIUM SERPL-MCNC: 2 MG/DL
MAGNESIUM SERPL-MCNC: 2 MG/DL
MCH RBC QN AUTO: 26.2 PG
MCH RBC QN AUTO: 26.9 PG
MCH RBC QN AUTO: 27.3 PG
MCHC RBC AUTO-ENTMCNC: 34.2 G/DL
MCHC RBC AUTO-ENTMCNC: 34.2 G/DL
MCHC RBC AUTO-ENTMCNC: 35 G/DL
MCV RBC AUTO: 77 FL
MCV RBC AUTO: 78 FL
MCV RBC AUTO: 79 FL
MODE: ABNORMAL
MONOCYTES # BLD AUTO: 1.2 K/UL
MONOCYTES # BLD AUTO: 1.2 K/UL
MONOCYTES # BLD AUTO: 1.3 K/UL
MONOCYTES NFR BLD: 10.9 %
MONOCYTES NFR BLD: 8.7 %
MONOCYTES NFR BLD: 9.4 %
NEUTROPHILS # BLD AUTO: 10.6 K/UL
NEUTROPHILS # BLD AUTO: 11.4 K/UL
NEUTROPHILS # BLD AUTO: 9.7 K/UL
NEUTROPHILS NFR BLD: 83.3 %
NEUTROPHILS NFR BLD: 84.6 %
NEUTROPHILS NFR BLD: 85.1 %
NRBC BLD-RTO: 0 /100 WBC
OVALOCYTES BLD QL SMEAR: ABNORMAL
PCO2 BLDA: 50.8 MMHG (ref 35–45)
PH SMN: 7.3 [PH] (ref 7.35–7.45)
PHOSPHATE SERPL-MCNC: 2.7 MG/DL
PHOSPHATE SERPL-MCNC: 2.8 MG/DL
PHOSPHATE SERPL-MCNC: 2.8 MG/DL
PLATELET # BLD AUTO: 20 K/UL
PLATELET # BLD AUTO: 26 K/UL
PLATELET # BLD AUTO: 27 K/UL
PLATELET BLD QL SMEAR: ABNORMAL
PLATELET BLD QL SMEAR: ABNORMAL
PMV BLD AUTO: ABNORMAL FL
PO2 BLDA: 43 MMHG (ref 40–60)
POC BE: -2 MMOL/L
POC SATURATED O2: 73 % (ref 95–100)
POC TCO2: 26 MMOL/L (ref 24–29)
POCT GLUCOSE: 127 MG/DL (ref 70–110)
POCT GLUCOSE: 130 MG/DL (ref 70–110)
POCT GLUCOSE: 134 MG/DL (ref 70–110)
POCT GLUCOSE: 136 MG/DL (ref 70–110)
POCT GLUCOSE: 139 MG/DL (ref 70–110)
POCT GLUCOSE: 142 MG/DL (ref 70–110)
POCT GLUCOSE: 149 MG/DL (ref 70–110)
POCT GLUCOSE: 156 MG/DL (ref 70–110)
POCT GLUCOSE: 164 MG/DL (ref 70–110)
POCT GLUCOSE: 184 MG/DL (ref 70–110)
POCT GLUCOSE: 78 MG/DL (ref 70–110)
POCT GLUCOSE: 79 MG/DL (ref 70–110)
POCT GLUCOSE: 80 MG/DL (ref 70–110)
POCT GLUCOSE: 80 MG/DL (ref 70–110)
POCT GLUCOSE: 81 MG/DL (ref 70–110)
POCT GLUCOSE: 81 MG/DL (ref 70–110)
POCT GLUCOSE: 83 MG/DL (ref 70–110)
POCT GLUCOSE: 84 MG/DL (ref 70–110)
POCT GLUCOSE: 85 MG/DL (ref 70–110)
POCT GLUCOSE: 86 MG/DL (ref 70–110)
POCT GLUCOSE: 86 MG/DL (ref 70–110)
POCT GLUCOSE: 87 MG/DL (ref 70–110)
POIKILOCYTOSIS BLD QL SMEAR: SLIGHT
POLYCHROMASIA BLD QL SMEAR: ABNORMAL
POTASSIUM SERPL-SCNC: 3.8 MMOL/L
POTASSIUM SERPL-SCNC: 4 MMOL/L
POTASSIUM SERPL-SCNC: 4.2 MMOL/L
PROT SERPL-MCNC: 3.4 G/DL
PROT SERPL-MCNC: 3.4 G/DL
PROT SERPL-MCNC: 3.6 G/DL
RBC # BLD AUTO: 2.64 M/UL
RBC # BLD AUTO: 3.05 M/UL
RBC # BLD AUTO: 3.08 M/UL
SAMPLE: ABNORMAL
SCHISTOCYTES BLD QL SMEAR: ABNORMAL
SITE: ABNORMAL
SODIUM SERPL-SCNC: 135 MMOL/L
SODIUM SERPL-SCNC: 135 MMOL/L
SODIUM SERPL-SCNC: 137 MMOL/L
TARGETS BLD QL SMEAR: ABNORMAL
TRANS ERYTHROCYTES VOL PATIENT: NORMAL ML
TRANS ERYTHROCYTES VOL PATIENT: NORMAL ML
WBC # BLD AUTO: 11.59 K/UL
WBC # BLD AUTO: 12.56 K/UL
WBC # BLD AUTO: 13.38 K/UL

## 2019-01-29 PROCEDURE — 63600175 PHARM REV CODE 636 W HCPCS: Performed by: STUDENT IN AN ORGANIZED HEALTH CARE EDUCATION/TRAINING PROGRAM

## 2019-01-29 PROCEDURE — B4185 PARENTERAL SOL 10 GM LIPIDS: HCPCS | Performed by: STUDENT IN AN ORGANIZED HEALTH CARE EDUCATION/TRAINING PROGRAM

## 2019-01-29 PROCEDURE — 94668 MNPJ CHEST WALL SBSQ: CPT

## 2019-01-29 PROCEDURE — 97535 SELF CARE MNGMENT TRAINING: CPT

## 2019-01-29 PROCEDURE — A4217 STERILE WATER/SALINE, 500 ML: HCPCS | Performed by: STUDENT IN AN ORGANIZED HEALTH CARE EDUCATION/TRAINING PROGRAM

## 2019-01-29 PROCEDURE — P9045 ALBUMIN (HUMAN), 5%, 250 ML: HCPCS | Mod: JG | Performed by: STUDENT IN AN ORGANIZED HEALTH CARE EDUCATION/TRAINING PROGRAM

## 2019-01-29 PROCEDURE — 80053 COMPREHEN METABOLIC PANEL: CPT

## 2019-01-29 PROCEDURE — 97166 OT EVAL MOD COMPLEX 45 MIN: CPT

## 2019-01-29 PROCEDURE — 25000003 PHARM REV CODE 250: Performed by: STUDENT IN AN ORGANIZED HEALTH CARE EDUCATION/TRAINING PROGRAM

## 2019-01-29 PROCEDURE — 25000003 PHARM REV CODE 250

## 2019-01-29 PROCEDURE — 25000003 PHARM REV CODE 250: Performed by: SURGERY

## 2019-01-29 PROCEDURE — 93005 ELECTROCARDIOGRAM TRACING: CPT

## 2019-01-29 PROCEDURE — 94761 N-INVAS EAR/PLS OXIMETRY MLT: CPT

## 2019-01-29 PROCEDURE — S0030 INJECTION, METRONIDAZOLE: HCPCS | Performed by: SURGERY

## 2019-01-29 PROCEDURE — 83605 ASSAY OF LACTIC ACID: CPT

## 2019-01-29 PROCEDURE — 25000242 PHARM REV CODE 250 ALT 637 W/ HCPCS: Performed by: STUDENT IN AN ORGANIZED HEALTH CARE EDUCATION/TRAINING PROGRAM

## 2019-01-29 PROCEDURE — 99232 SBSQ HOSP IP/OBS MODERATE 35: CPT | Mod: ,,, | Performed by: NURSE PRACTITIONER

## 2019-01-29 PROCEDURE — C9113 INJ PANTOPRAZOLE SODIUM, VIA: HCPCS | Performed by: PHYSICIAN ASSISTANT

## 2019-01-29 PROCEDURE — 82330 ASSAY OF CALCIUM: CPT | Mod: 91

## 2019-01-29 PROCEDURE — 97162 PT EVAL MOD COMPLEX 30 MIN: CPT

## 2019-01-29 PROCEDURE — 63600175 PHARM REV CODE 636 W HCPCS: Mod: JG | Performed by: STUDENT IN AN ORGANIZED HEALTH CARE EDUCATION/TRAINING PROGRAM

## 2019-01-29 PROCEDURE — 93010 EKG 12-LEAD: ICD-10-PCS | Mod: ,,, | Performed by: INTERNAL MEDICINE

## 2019-01-29 PROCEDURE — 93010 ELECTROCARDIOGRAM REPORT: CPT | Mod: ,,, | Performed by: INTERNAL MEDICINE

## 2019-01-29 PROCEDURE — 63600175 PHARM REV CODE 636 W HCPCS: Performed by: SURGERY

## 2019-01-29 PROCEDURE — 25000003 PHARM REV CODE 250: Performed by: PHYSICIAN ASSISTANT

## 2019-01-29 PROCEDURE — 99232 PR SUBSEQUENT HOSPITAL CARE,LEVL II: ICD-10-PCS | Mod: ,,, | Performed by: NURSE PRACTITIONER

## 2019-01-29 PROCEDURE — 63600175 PHARM REV CODE 636 W HCPCS: Performed by: PHYSICIAN ASSISTANT

## 2019-01-29 PROCEDURE — 84100 ASSAY OF PHOSPHORUS: CPT

## 2019-01-29 PROCEDURE — 97530 THERAPEUTIC ACTIVITIES: CPT

## 2019-01-29 PROCEDURE — 94640 AIRWAY INHALATION TREATMENT: CPT

## 2019-01-29 PROCEDURE — 25000242 PHARM REV CODE 250 ALT 637 W/ HCPCS: Performed by: SURGERY

## 2019-01-29 PROCEDURE — 99233 PR SUBSEQUENT HOSPITAL CARE,LEVL III: ICD-10-PCS | Mod: ,,, | Performed by: INTERNAL MEDICINE

## 2019-01-29 PROCEDURE — 83735 ASSAY OF MAGNESIUM: CPT | Mod: 91

## 2019-01-29 PROCEDURE — 85025 COMPLETE CBC W/AUTO DIFF WBC: CPT

## 2019-01-29 PROCEDURE — 20000000 HC ICU ROOM

## 2019-01-29 PROCEDURE — 99233 SBSQ HOSP IP/OBS HIGH 50: CPT | Mod: ,,, | Performed by: INTERNAL MEDICINE

## 2019-01-29 RX ORDER — METOPROLOL TARTRATE 1 MG/ML
5 INJECTION, SOLUTION INTRAVENOUS ONCE
Status: COMPLETED | OUTPATIENT
Start: 2019-01-29 | End: 2019-01-29

## 2019-01-29 RX ORDER — METRONIDAZOLE 500 MG/100ML
500 INJECTION, SOLUTION INTRAVENOUS
Status: DISCONTINUED | OUTPATIENT
Start: 2019-01-29 | End: 2019-02-11

## 2019-01-29 RX ORDER — FUROSEMIDE 10 MG/ML
40 INJECTION INTRAMUSCULAR; INTRAVENOUS ONCE
Status: COMPLETED | OUTPATIENT
Start: 2019-01-29 | End: 2019-01-29

## 2019-01-29 RX ORDER — ALBUMIN HUMAN 50 G/1000ML
12.5 SOLUTION INTRAVENOUS ONCE
Status: COMPLETED | OUTPATIENT
Start: 2019-01-29 | End: 2019-01-29

## 2019-01-29 RX ORDER — POTASSIUM CHLORIDE 29.8 MG/ML
40 INJECTION INTRAVENOUS ONCE
Status: COMPLETED | OUTPATIENT
Start: 2019-01-29 | End: 2019-01-29

## 2019-01-29 RX ORDER — METOPROLOL TARTRATE 1 MG/ML
INJECTION, SOLUTION INTRAVENOUS
Status: COMPLETED
Start: 2019-01-29 | End: 2019-01-29

## 2019-01-29 RX ORDER — CIPROFLOXACIN 2 MG/ML
400 INJECTION, SOLUTION INTRAVENOUS
Status: DISCONTINUED | OUTPATIENT
Start: 2019-01-29 | End: 2019-02-11

## 2019-01-29 RX ADMIN — HYDROCORTISONE SODIUM SUCCINATE 50 MG: 100 INJECTION, POWDER, FOR SOLUTION INTRAMUSCULAR; INTRAVENOUS at 09:01

## 2019-01-29 RX ADMIN — CIPROFLOXACIN 400 MG: 2 INJECTION, SOLUTION INTRAVENOUS at 10:01

## 2019-01-29 RX ADMIN — METOPROLOL TARTRATE 5 MG: 1 INJECTION, SOLUTION INTRAVENOUS at 07:01

## 2019-01-29 RX ADMIN — SOYBEAN OIL 250 ML: 20 INJECTION, SOLUTION INTRAVENOUS at 09:01

## 2019-01-29 RX ADMIN — HYDROMORPHONE HYDROCHLORIDE 0.5 MG: 1 INJECTION, SOLUTION INTRAMUSCULAR; INTRAVENOUS; SUBCUTANEOUS at 12:01

## 2019-01-29 RX ADMIN — HYDROMORPHONE HYDROCHLORIDE 0.5 MG: 1 INJECTION, SOLUTION INTRAMUSCULAR; INTRAVENOUS; SUBCUTANEOUS at 02:01

## 2019-01-29 RX ADMIN — HYDROMORPHONE HYDROCHLORIDE 0.5 MG: 1 INJECTION, SOLUTION INTRAMUSCULAR; INTRAVENOUS; SUBCUTANEOUS at 04:01

## 2019-01-29 RX ADMIN — FUROSEMIDE 40 MG: 10 INJECTION, SOLUTION INTRAVENOUS at 08:01

## 2019-01-29 RX ADMIN — IPRATROPIUM BROMIDE AND ALBUTEROL SULFATE 3 ML: .5; 3 SOLUTION RESPIRATORY (INHALATION) at 09:01

## 2019-01-29 RX ADMIN — DEXTROSE: 10 SOLUTION INTRAVENOUS at 02:01

## 2019-01-29 RX ADMIN — HYDROCORTISONE SODIUM SUCCINATE 50 MG: 100 INJECTION, POWDER, FOR SOLUTION INTRAMUSCULAR; INTRAVENOUS at 02:01

## 2019-01-29 RX ADMIN — POTASSIUM CHLORIDE 40 MEQ: 29.8 INJECTION, SOLUTION INTRAVENOUS at 06:01

## 2019-01-29 RX ADMIN — PANTOPRAZOLE SODIUM 40 MG: 40 INJECTION, POWDER, LYOPHILIZED, FOR SOLUTION INTRAVENOUS at 09:01

## 2019-01-29 RX ADMIN — METRONIDAZOLE 500 MG: 500 INJECTION, SOLUTION INTRAVENOUS at 05:01

## 2019-01-29 RX ADMIN — HYDROMORPHONE HYDROCHLORIDE 0.5 MG: 1 INJECTION, SOLUTION INTRAMUSCULAR; INTRAVENOUS; SUBCUTANEOUS at 06:01

## 2019-01-29 RX ADMIN — IPRATROPIUM BROMIDE AND ALBUTEROL SULFATE 3 ML: .5; 3 SOLUTION RESPIRATORY (INHALATION) at 12:01

## 2019-01-29 RX ADMIN — IPRATROPIUM BROMIDE AND ALBUTEROL SULFATE 3 ML: .5; 3 SOLUTION RESPIRATORY (INHALATION) at 01:01

## 2019-01-29 RX ADMIN — IPRATROPIUM BROMIDE AND ALBUTEROL SULFATE 3 ML: .5; 3 SOLUTION RESPIRATORY (INHALATION) at 08:01

## 2019-01-29 RX ADMIN — METRONIDAZOLE 500 MG: 500 INJECTION, SOLUTION INTRAVENOUS at 10:01

## 2019-01-29 RX ADMIN — HYDROMORPHONE HYDROCHLORIDE 0.5 MG: 1 INJECTION, SOLUTION INTRAMUSCULAR; INTRAVENOUS; SUBCUTANEOUS at 10:01

## 2019-01-29 RX ADMIN — MAGNESIUM SULFATE HEPTAHYDRATE 1 G: 500 INJECTION, SOLUTION INTRAMUSCULAR; INTRAVENOUS at 09:01

## 2019-01-29 RX ADMIN — FLUTICASONE FUROATE AND VILANTEROL TRIFENATATE 1 PUFF: 100; 25 POWDER RESPIRATORY (INHALATION) at 09:01

## 2019-01-29 RX ADMIN — FUROSEMIDE 40 MG: 10 INJECTION, SOLUTION INTRAVENOUS at 06:01

## 2019-01-29 RX ADMIN — CALCIUM GLUCONATE: 94 INJECTION, SOLUTION INTRAVENOUS at 09:01

## 2019-01-29 RX ADMIN — ALBUMIN HUMAN 12.5 G: 0.05 INJECTION, SOLUTION INTRAVENOUS at 06:01

## 2019-01-29 RX ADMIN — PIPERACILLIN AND TAZOBACTAM 4.5 G: 4; .5 INJECTION, POWDER, LYOPHILIZED, FOR SOLUTION INTRAVENOUS; PARENTERAL at 04:01

## 2019-01-29 RX ADMIN — HYDROCORTISONE SODIUM SUCCINATE 50 MG: 100 INJECTION, POWDER, FOR SOLUTION INTRAMUSCULAR; INTRAVENOUS at 05:01

## 2019-01-29 RX ADMIN — DEXTROSE: 10 SOLUTION INTRAVENOUS at 10:01

## 2019-01-29 RX ADMIN — PANTOPRAZOLE SODIUM 40 MG: 40 INJECTION, POWDER, LYOPHILIZED, FOR SOLUTION INTRAVENOUS at 08:01

## 2019-01-29 RX ADMIN — VANCOMYCIN HYDROCHLORIDE 1000 MG: 1 INJECTION, POWDER, FOR SOLUTION INTRAVENOUS at 10:01

## 2019-01-29 NOTE — ASSESSMENT & PLAN NOTE
- cards consulted for EKG changes, mild elevation in troponin, type II MI, on ASA/clopidogrel- now holding for thrombocytopenia

## 2019-01-29 NOTE — ASSESSMENT & PLAN NOTE
Last echo 8/2018 with no WMAs, grade 1DD, EF 60  - strict I/O, daily weights  -start diuresis today

## 2019-01-29 NOTE — SUBJECTIVE & OBJECTIVE
Interval History/Significant Events: NAEON. Yesterday, patient became hypoglycemic requiring multiple d5 pushes. Insulin gtt off, started d10 infusion. Now stable. Platelets continue to trend down despite stopping heparin, HIT panel sent. No clinical signs of bleeding. Suspect zosyn or vanc - will touch base with ID today. Tachycardic overnight, though she appears clinically very edematous, with pulmonary edema on CXR. Trial of 250cc colloid per primary team. Otherwise HDS. Extubated yesterday, on 4L NC. Plan to restart TPN today.    Follow-up For: Procedure(s) (LRB):  LAPAROTOMY, EXPLORATORY (N/A)  INCISION AND DRAINAGE, ABSCESS-  drainage of pelvic abscess (N/A)  EXCISION, ABSCESS- drainage abdominal wall abscess (N/A)  APPLICATION, WOUND VAC (N/A)    Post-Operative Day: 3 Days Post-Op    Objective:     Vital Signs (Most Recent):  Temp: 98.2 °F (36.8 °C) (01/29/19 0300)  Pulse: (!) 111 (01/29/19 0530)  Resp: 11 (01/29/19 0530)  BP: 120/64 (01/29/19 0530)  SpO2: 100 % (01/29/19 0530) Vital Signs (24h Range):  Temp:  [97.8 °F (36.6 °C)-98.7 °F (37.1 °C)] 98.2 °F (36.8 °C)  Pulse:  [] 111  Resp:  [11-33] 11  SpO2:  [94 %-100 %] 100 %  BP: ()/() 120/64  Arterial Line BP: ()/() 91/75     Weight: 71.2 kg (157 lb)  Body mass index is 29.66 kg/m².      Intake/Output Summary (Last 24 hours) at 1/29/2019 0649  Last data filed at 1/29/2019 0600  Gross per 24 hour   Intake 4150.42 ml   Output 1941 ml   Net 2209.42 ml       Physical Exam   Constitutional: She appears well-developed and well-nourished. No distress.   HENT:   Head: Normocephalic.   Eyes: Pupils are equal, round, and reactive to light.   Cardiovascular: Regular rhythm.   Tachycardic   Pulmonary/Chest: No respiratory distress. She has no wheezes.   Intubated, mechanical breath sounds   Abdominal: Soft. She exhibits no distension.   Neurological:   lethargic   Skin: Skin is warm and dry.       Vents:  Vent Mode: A/C (01/29/19  0140)  Ventilator Initiated: Yes (01/26/19 0239)  Set Rate: 15 bmp (01/29/19 0140)  Vt Set: 450 mL (01/29/19 0140)  Pressure Support: 5 cmH20 (01/29/19 0140)  PEEP/CPAP: 5 cmH20 (01/28/19 1050)  Oxygen Concentration (%): 100 (01/29/19 0140)  Peak Airway Pressure: 0 cmH2O (01/29/19 0140)  Plateau Pressure: 0 cmH20 (01/29/19 0140)  Total Ve: 0 mL (01/29/19 0140)  F/VT Ratio<105 (RSBI): (!) 26.57 (01/28/19 1050)    Lines/Drains/Airways     Central Venous Catheter Line                 Port A Cath Single Lumen 01/23/19 1400 right subclavian 5 days         Percutaneous Central Line Insertion/Assessment - triple lumen  01/26/19 0010 left internal jugular 3 days         Trialysis (Dialysis) Catheter 01/26/19 1200 right internal jugular 2 days          Drain                 Closed/Suction Drain 01/25/19 1020 Right;Ventral Abdomen Bulb 19 Fr. 3 days         Urethral Catheter 01/25/19 0934 Straight-tip;Non-latex 16 Fr. 3 days          Pressure Ulcer                 Negative Pressure Wound Therapy  3 days                Significant Labs:    CBC/Anemia Profile:  Recent Labs   Lab 01/28/19  1555 01/28/19  2345 01/29/19  0400   WBC 13.25* 13.38* 12.56   HGB 8.3* 8.3* 8.0*   HCT 23.7* 24.3* 23.4*   PLT 32* 27* 26*   MCV 77* 79* 77*   RDW 18.6* 18.5* 18.5*        Chemistries:  Recent Labs   Lab 01/28/19  1555 01/28/19  2147 01/28/19  2345    137 137   K 4.1 4.1 4.0    106 105   CO2 26 26 26   BUN 6* 6* 6*   CREATININE 0.5 0.6 0.6   CALCIUM 7.6* 7.7* 7.6*   ALBUMIN 1.6* 1.4* 1.4*   PROT 3.5* 3.4* 3.4*   BILITOT 2.7* 2.6* 2.6*   ALKPHOS 407* 398* 396*   ALT 31 39 42   AST 98* 111* 117*   MG 1.8 1.6 2.0   PHOS 2.0* 2.7 2.7       All pertinent labs within the past 24 hours have been reviewed.    Significant Imaging:  I have reviewed all pertinent imaging results/findings within the past 24 hours.

## 2019-01-29 NOTE — PT/OT/SLP EVAL
"Physical Therapy Evaluation and treatment    Patient Name:  Sheryl Martines   MRN:  [id number]    Recommendations:     Discharge Recommendations:  nursing facility, skilled   Discharge Equipment Recommendations: (will determine DME needs closer to discharge)   Barriers to discharge: Decreased caregiver support family may not be able to assist at current functional level.     Assessment:     Sheryl Martines is a 63 y.o. female admitted with a medical diagnosis of Pelvic abscess in female.  She presents with the following impairments/functional limitations:  weakness, impaired functional mobilty, impaired endurance, impaired balance, decreased lower extremity function, pain pt ani treatment fair but pt is very deconditioned and pain was a limited factor. Pt will benefit from skilled PT 2x/wk to progress physically. Pt will need SNF placement to maximize rehab potential. Pt is s/p exp lap, drainage deep pelvic abscess, drainage abdominal wall abscess, wound vac 1/25/19.    Rehab Prognosis: Fair; patient would benefit from acute skilled PT services to address these deficits and reach maximum level of function.    Recent Surgery: Procedure(s) (LRB):  LAPAROTOMY, EXPLORATORY (N/A)  INCISION AND DRAINAGE, ABSCESS-  drainage of pelvic abscess (N/A)  EXCISION, ABSCESS- drainage abdominal wall abscess (N/A)  APPLICATION, WOUND VAC (N/A) 4 Days Post-Op    Plan:     During this hospitalization, patient to be seen 2 x/week to address the identified rehab impairments via therapeutic activities, therapeutic exercises, neuromuscular re-education and progress toward the following goals:    · Plan of Care Expires:  03/24/19    Subjective     Chief Complaint: pt c/o pain during treatment.     Patient/Family Comments/goals: to have less pain and go home.     Pain/Comfort:  · Pain Rating 1: 10/10  · Location 1: ("all over")  · Pain Rating Post-Intervention 1: 10/10    Patients cultural, spiritual, Spiritism conflicts given the current " situation:      Living Environment:  Pt lives with her daughter who works. Pt will be home alone during the day. They live in 1 story with ramp entrance.   Prior to admission, patients level of function was total assist for activities. .  Equipment used at home: wheelchair, walker, rolling, bedside commode, shower chair.  DME owned (not currently used): none.  Upon discharge, patient will have assistance from daughter after work. .    Objective:     Communicated with nurse prior to session.  Patient found supine with  telemetry, blood pressure cuff, nieves catheter, LUCIANO drain, central line(B Jeramie Vincent boots)  upon PT entry to room.    General Precautions: Standard, fall   Orthopedic Precautions:    Braces:       Exams:  · Cognitive Exam:  Patient is oriented to Person, Place, Time and Situation     ROM and MMT not performed 2nd to pain.     Functional Mobility:  · Bed Mobility:     · Rolling Left:  total assistance and of 2 persons pt rolled x 2 reps to each side for changing bed linen.   · Rolling Right: total assistance and of 2 persons  ·   · Supine to Sit: total assistance and of 2 persons  · Sit to Supine: total assistance and of 2 persons  ·   · Balance: pt sat on EOB x 3 min with total assist for static sitting balance. pt leaned to R side with sitting.         AM-PAC 6 CLICK MOBILITY  Total Score:8     Patient left right sidelying with all lines intact, call button in reach and daughter present.    GOALS:   Multidisciplinary Problems     Physical Therapy Goals        Problem: Physical Therapy Goal    Goal Priority Disciplines Outcome Goal Variances Interventions   Physical Therapy Goal     PT, PT/OT      Description:  Goals to be met by: 19    Patient will increase functional independence with mobility by performin. Supine to sit with Moderate Assistance -not met  2. Sit to stand transfer with Maximum Assistance -not met  3. Bed to chair transfer with Maximum Assistance -not met  4. Sitting at edge  of bed x10 minutes with Contact Guard Assistance -not met  5. Lower extremity exercise program x10 reps, with assistance as needed -not met                      History:     Past Medical History:   Diagnosis Date    Abnormal LFTs 12/14/2018    Asthma     Closed compression fracture of fourth lumbar vertebra     COPD (chronic obstructive pulmonary disease)     Coronary artery disease     Diabetes mellitus     Fall 10/4/2018    Fracture     right tib & fib    Glaucoma     High cholesterol     Hypertension     Infected incision 9/20/2018    Intractable nausea and vomiting 1/23/2019    Iritis     Pulmonary embolus     S/P appendectomy 9/11/2018    Stroke     rt sided weakness.       Past Surgical History:   Procedure Laterality Date    ABDOMINAL SURGERY      APPENDECTOMY N/A 8/26/2018    Performed [facility]    APPENDECTOMY, LAPAROSCOPIC---CONVERTED TO OPEN APPENDECTOMY @0950 N/A 8/26/2018    [facility]    APPLICATION, WOUND VAC N/A 1/25/2019    Performed by Monster Laurent MD at [facility] OR [address] [place] [address]    BACK SURGERY      stimulator    CATARACT EXTRACTION      EXCISION, ABSCESS- drainage abdominal wall abscess N/A 1/25/2019    Performed by Monster Laurent MD at [facility] OR [address] [place] [address]    HYSTERECTOMY      INCISION AND DRAINAGE, ABSCESS-  drainage of pelvic abscess N/A 1/25/2019    Performed by Monster Laurent MD at [facility] OR [address] [place] [address]    LAPAROTOMY, EXPLORATORY N/A 1/25/2019    Performed by Monster Laurent MD at [facility] OR [address] [place] [address]    TOTAL HIP ARTHROPLASTY Left     2008       Clinical Decision Making:     History  Co-morbidities and personal factors that may impact the plan of care Examination  Body Structures and Functions, activity limitations and participation restrictions that may impact the plan of care Clinical Presentation   Decision Making/ Complexity Score   Co-morbidities:   [] Time since onset of injury / illness /  exacerbation  [] Status of current condition  []Patient's cognitive status and safety concerns    [] Multiple Medical Problems (see med hx)  Personal Factors:   [] Patient's age  [] Prior Level of function   [] Patient's home situation (environment and family support)  [] Patient's level of motivation  [] Expected progression of patient      HISTORY:(criteria)    [] 47359 - no personal factors/history    [] 62249 - has 1-2 personal factor/comorbidity     [] 82296 - has >3 personal factor/comorbidity     Body Regions:  [] Objective examination findings  [] Head     []  Neck  [] Trunk   [] Upper Extremity  [] Lower Extremity    Body Systems:  [] For communication ability, affect, cognition, language, and learning style: the assessment of the ability to make needs known, consciousness, orientation (person, place, and time), expected emotional /behavioral responses, and learning preferences (eg, learning barriers, education  needs)  [] For the neuromuscular system: a general assessment of gross coordinated movement (eg, balance, gait, locomotion, transfers, and transitions) and motor function  (motor control and motor learning)  [] For the musculoskeletal system: the assessment of gross symmetry, gross range of motion, gross strength, height, and weight  [] For the integumentary system: the assessment of pliability(texture), presence of scar formation, skin color, and skin integrity  [] For cardiovascular/pulmonary system: the assessment of heart rate, respiratory rate, blood pressure, and edema     Activity limitations:    [] Patient's cognitive status and saf ety concerns          [] Status of current condition      [] Weight bearing restriction  [] Cardiopulmunary Restriction    Participation Restrictions:   [] Goals and goal agreement with the patient     [] Rehab potential (prognosis) and probable outcome      Examination of Body System: (criteria)    [] 19349 - addressing 1-2 elements    [] 41589 - addressing a  total of 3 or more elements     [] 99607 -  Addressing a total of 4 or more elements         Clinical Presentation: (criteria)  Choose one     On examination of body system using standardized tests and measures patient presents with (CHOOSE ONE) elements from any of the following: body structures and functions, activity limitations, and/or participation restrictions.  Leading to a clinical presentation that is considered (CHOOSE ONE)                              Clinical Decision Making  (Eval Complexity):  Choose One     Time Tracking:     PT Received On: 01/29/19  PT Start Time: 0929     PT Stop Time: 0954  PT Total Time (min): 25 min     Billable Minutes: Evaluation 10 min and Therapeutic Activity 15 min      Tita Laws, PT  01/29/2019

## 2019-01-29 NOTE — SUBJECTIVE & OBJECTIVE
"Interval HPI:   Overnight events:    Remains in SICU. BG is now below goal without need for IV insulin infusion. Remains on steroid therapy.      Eating:   NPO  Nausea: No  Hypoglycemia and intervention: No  Fever: No  TPN and/or TF: No  If yes, type of TF/TPN and rate: None    BP (!) 148/86   Pulse 94   Temp 98.2 °F (36.8 °C) (Oral)   Resp 15   Ht 5' 1" (1.549 m)   Wt 71.2 kg (157 lb)   LMP  (LMP Unknown)   SpO2 100%   Breastfeeding? No   BMI 29.66 kg/m²     Labs Reviewed and Include    Recent Labs   Lab 01/29/19  0803   GLU 74   CALCIUM 7.5*   ALBUMIN 1.7*   PROT 3.6*   *   K 4.2   CO2 27      BUN 6*   CREATININE 0.6   ALKPHOS 338*   ALT 48*   *   BILITOT 2.7*     Lab Results   Component Value Date    WBC 12.56 01/29/2019    HGB 8.0 (L) 01/29/2019    HCT 23.4 (L) 01/29/2019    MCV 77 (L) 01/29/2019    PLT 26 (LL) 01/29/2019     No results for input(s): TSH, FREET4 in the last 168 hours.  Lab Results   Component Value Date    HGBA1C 4.7 01/24/2019       Nutritional status:   Body mass index is 29.66 kg/m².  Lab Results   Component Value Date    ALBUMIN 1.7 (L) 01/29/2019    ALBUMIN 1.4 (L) 01/28/2019    ALBUMIN 1.4 (L) 01/28/2019     Lab Results   Component Value Date    PREALBUMIN 3 (L) 01/28/2019    PREALBUMIN 5 (L) 01/24/2019    PREALBUMIN 20 10/05/2018       Estimated Creatinine Clearance: 86.7 mL/min (based on SCr of 0.6 mg/dL).    Accu-Checks  Recent Labs     01/29/19  0157 01/29/19  0314 01/29/19  0403 01/29/19  0525 01/29/19  0611 01/29/19  0729 01/29/19  0731 01/29/19  0801 01/29/19  0911 01/29/19  0956   POCTGLUCOSE 85 78 80 81 79 80 81 83 86 87       Current Medications and/or Treatments Impacting Glycemic Control  Immunotherapy:    Immunosuppressants     None        Steroids:   Hormones (From admission, onward)    Start     Stop Route Frequency Ordered    01/28/19 1400  hydrocortisone sodium succinate injection 50 mg      -- IV Every 8 hours 01/28/19 0905        Pressors:  "   Autonomic Drugs (From admission, onward)    None        Hyperglycemia/Diabetes Medications:   Antihyperglycemics (From admission, onward)    Start     Stop Route Frequency Ordered    01/27/19 1400  insulin regular (Humulin R) 100 Units in sodium chloride 0.9% 100 mL infusion     Question:  Insulin Rate Adjustment (DO NOT MODIFY ANSWER)  Answer:  [email or web address]    -- IV Continuous 01/27/19 1255

## 2019-01-29 NOTE — ASSESSMENT & PLAN NOTE
Pt is a 63 y.o. female with hx of asthma, COPD, CAD, DM, HTN, CVA 2012 with R side deficits, HFpEF, severe debility , lap converted to open appendectomy 8/2018 complicated by cdiff, failure to thrive and recurrent fluid collection presented to the ED with intractable n/v and abdominal pain. Previous Cxs- MRSA, Enterococcus, Ecoli and Anaerobes.   On admit, Lactic acid 6.8, with tachycardia and WBC of 13.  1/24 CT shows fluid collection in R hemipelvis.  Started on vanc and zosyn.   Patient now s/p exploratory laparotomy, washout, and pelvic abscess drainage on 1/25/19. Gross purulence.      Blood cx positive for MRSA on 1/24. Repeat NGTD  Cxs from sx NGTD.  Gram Stain -GNR    -Continue pt on Vanc Trough goal 15-20.    -Anticipate 4-6 weeks of abx therapy est end date 3/8/19 pending imaging..  -Pt will need 2 weeks of IV vancomycin from blood clearance date-1/25. (Est end date-2/8/19).  Upon end of Vancomycin therapy, transition to Doxycycline.  - Zosyn discontinued as result of persistent thrombocytopenia and pt started on Cipro and Flagyl.  -Continue IV Cipro and Flagyl until pt can tolerate PO medications then pt can be transitioned to PO Cipro and Flagyl.  -Pt will need repeat CT scan of pelvis in 2 weeks with ID f/u to determine final duration of therapy.  -If CT negative for fluid collection consider transition to Doxy + Cipro for remainder of therapy.   -Will continue to monitor sx cxs.  -ID will sign off

## 2019-01-29 NOTE — PROGRESS NOTES
[facility]  Infectious Disease  Progress Note    Patient Name: Sheryl Martnies  MRN: [id number]  Admission Date: 1/23/2019  Length of Stay: 5 days  Attending Physician: Monster Laurent MD  Primary Care Provider: Primary Doctor No    Isolation Status: Contact  Assessment/Plan:      * Pelvic abscess in female    Pt is a 63 y.o. female with hx of asthma, COPD, CAD, DM, HTN, CVA 2012 with R side deficits, HFpEF, severe debility , lap converted to open appendectomy 8/2018 complicated by cdiff, failure to thrive and recurrent fluid collection presented to the ED with intractable n/v and abdominal pain. Previous Cxs- MRSA, Enterococcus, Ecoli and Anaerobes.   On admit, Lactic acid 6.8, with tachycardia and WBC of 13.  1/24 CT shows fluid collection in R hemipelvis.  Started on vanc and zosyn.   Patient now s/p exploratory laparotomy, washout, and pelvic abscess drainage on 1/25/19. Gross purulence.      Blood cx positive for MRSA on 1/24. Repeat NGTD  Cxs from sx NGTD.  Gram Stain -GNR    -Continue pt on Vanc Trough goal 15-20.    -Anticipate 4-6 weeks of abx therapy est end date 3/8/19 pending imaging..  -Pt will need 2 weeks of IV vancomycin from blood clearance date-1/25. (Est end date-2/8/19).  Upon end of Vancomycin therapy, transition to Doxycycline.  - Zosyn discontinued as result of persistent thrombocytopenia and pt started on Cipro and Flagyl.  -Continue IV Cipro and Flagyl until pt can tolerate PO medications then pt can be transitioned to PO Cipro and Flagyl.  -Pt will need repeat CT scan of pelvis in 2 weeks with ID f/u to determine final duration of therapy.  -If CT negative for fluid collection consider transition to Doxy + Cipro for remainder of therapy.   -Will continue to monitor sx cxs.  -ID will sign off         Thank you for your consult. I will sign off. Please contact us if you have any additional questions.    Ej Chin PA-C  Infectious Disease  [facility]    Subjective:     Principal Problem:Pelvic abscess in female    HPI: Pt is a 63 y.o. female with hx of asthma, COPD, CAD, DM, HTN, CVA 2012 with R side deficits, HFpEF, severe debility , lap converted to open appendectomy 8/2018 complicated by cdiff, failure to thrive and recurrent fluid collection presented to the ED with intractable n/v and abdominal pain.      Appendectomy complcated by sx site infxn with recurrent fluid collection.  Cxs- MRSA, Enterococcus, Ecoli and Anaerobes.   Abx treatment Vanc 9/11-10/1;  Pip-tazo 9/11-9/13, 9/23-10/1; Ertapenem 9/14-9/22.  No change in fluid collection.  Abx discontinued on 10/1. Lactic acid 6.8, with tachycardia and WBC of 13.  1/24 CT shows fluid collection in R hemipelvis.  Started on vanc and zosyn. Patient now s/p exploratory laparotomy, washout, and pelvic abscess drainage on 1/25/19. Gross purulence.  Blood cx positive for MRSA on 1/24.     Of note pt w/ R subclavian port o cath that has been present since 2005    Interval History: Spoke w/ family about abx plan.  Repeat Blood cxs negative.    Review of Systems   Unable to perform ROS: Other     Objective:     Vital Signs (Most Recent):  Temp: 98.9 °F (37.2 °C) (01/29/19 1500)  Pulse: 104 (01/29/19 1530)  Resp: 16 (01/29/19 1530)  BP: 138/67 (01/29/19 1500)  SpO2: 96 % (01/29/19 1530) Vital Signs (24h Range):  Temp:  [98 °F (36.7 °C)-98.9 °F (37.2 °C)] 98.9 °F (37.2 °C)  Pulse:  [] 104  Resp:  [8-34] 16  SpO2:  [94 %-100 %] 96 %  BP: (115-177)/() 138/67     Weight: 71.2 kg (157 lb)  Body mass index is 29.66 kg/m².    Estimated Creatinine Clearance: 86.7 mL/min (based on SCr of 0.6 mg/dL).    Physical Exam   Constitutional: She appears well-developed. She is sedated and intubated.   HENT:   Head: Normocephalic and atraumatic.   Eyes: Conjunctivae are normal. Right eye exhibits no discharge. Left eye exhibits no discharge.   Neck: Neck supple.   Cardiovascular: Normal rate and  regular rhythm. Exam reveals no gallop and no friction rub.   No murmur heard.  Pulmonary/Chest: Breath sounds normal. She is intubated. She has no wheezes. She has no rales.   Abdominal:   Wound vac to RLQ   Musculoskeletal: She exhibits edema. She exhibits no deformity.   Skin: Skin is warm. She is not diaphoretic.       Significant Labs:   Blood Culture:   Recent Labs   Lab 12/13/18  0537 01/24/19  0833 01/24/19  0839 01/25/19  1752 01/26/19  0109   LABBLOO No growth after 5 days. Gram stain aer bottle: Gram positive cocci in clusters resembling Staph   Results called to and read back by: Micheline Martin RN 01/25/2019    03:43  METHICILLIN RESISTANT STAPHYLOCOCCUS AUREUS  ID consult required at [facility] and [facility]   locations.   Gram stain aer bottle: Gram positive cocci in clusters resembling Staph   Results called to and read back by: Micheline Montgomery RN 01/25/2019    02:39  METHICILLIN RESISTANT STAPHYLOCOCCUS AUREUS  ID consult required at [facility] and [facility]   locations.  For susceptibility see order # [id number]   No Growth to date  No Growth to date  No Growth to date  No Growth to date No Growth to date  No Growth to date  No Growth to date  No Growth to date     BMP:   Recent Labs   Lab 01/29/19  0803   GLU 74   *   K 4.2      CO2 27   BUN 6*   CREATININE 0.6   CALCIUM 7.5*   MG 2.0     CBC:   Recent Labs   Lab 01/28/19  1555 01/28/19  2345 01/29/19  0400   WBC 13.25* 13.38* 12.56   HGB 8.3* 8.3* 8.0*   HCT 23.7* 24.3* 23.4*   PLT 32* 27* 26*     CMP:   Recent Labs   Lab 01/28/19  2147 01/28/19  2345 01/29/19  0803    137 135*   K 4.1 4.0 4.2    105 103   CO2 26 26 27   GLU 91 89 74   BUN 6* 6* 6*   CREATININE 0.6 0.6 0.6   CALCIUM 7.7* 7.6* 7.5*   PROT 3.4* 3.4* 3.6*   ALBUMIN 1.4* 1.4* 1.7*   BILITOT 2.6* 2.6* 2.7*   ALKPHOS 398* 396* 338*   * 117* 108*   ALT 39 42 48*   ANIONGAP 5* 6* 5*   EGFRNONAA >60.0 >60.0  >60.0     Wound Culture:   Recent Labs   Lab 08/26/18  1015 09/05/18  1246 09/10/18  1817 01/25/19  1000 01/25/19  1002   LABAERO ESCHERICHIA COLI  Moderate    ENTEROCOCCUS RAFFINOSUS  Moderate   METHICILLIN RESISTANT STAPHYLOCOCCUS AUREUS  Few    METHICILLIN RESISTANT STAPHYLOCOCCUS AUREUS  Moderate   METHICILLIN RESISTANT STAPHYLOCOCCUS AUREUS  Moderate  Skin david also present   No growth No growth     All pertinent labs within the past 24 hours have been reviewed.  Recent Lab Results  (Last 25 results in the past 24 hours)      01/29/19  1644   01/29/19  1402   01/29/19  1307   01/29/19  1208   01/29/19  1206        Immature Granulocytes               Immature Grans (Abs)               Albumin               Alkaline Phosphatase               Allens Test               ALT               Anion Gap               Aniso               AST               Baso #               Basophil%               Total Bilirubin               Site               BUN, Bld               [place] Cells               Calcium               Calcium, Ion               Chloride               CO2               Creatinine               DelSys               Differential Method               eGFR if                eGFR if non                Eos #               Eosinophil%               Glucose               Gran # (ANC)               Gran%               Hematocrit               Hemoglobin               Hypo               Lactate, Dom         2.1  Comment:  Falsely low lactic acid results can be found in samples   containing >=13.0 mg/dL total bilirubin and/or >=3.5 mg/dL   direct bilirubin.       Lymph #               Lymph%               Magnesium               MCH               MCHC               MCV               Mode               Mono #               Mono%               MPV               nRBC               Ovalocytes               Phosphorus               Platelet Estimate               Platelets               POC  BE               POC HCO3               POC PCO2               POC PH               POC PO2               POC SATURATED O2               POC TCO2               POCT Glucose 149 127 130 139       Poik               Poly               Potassium               Total Protein               RBC               RDW               Sample               Sodium               Target Cells               WBC                                01/29/19  1117   01/29/19  0956   01/29/19  0911   01/29/19  0803   01/29/19  0801        Immature Granulocytes               Immature Grans (Abs)               Albumin       1.7       Alkaline Phosphatase       338       Allens Test               ALT       48       Anion Gap       5       Aniso               AST       108       Baso #               Basophil%               Total Bilirubin       2.7  Comment:  For infants and newborns, interpretation of results should be based  on gestational age, weight and in agreement with clinical  observations.  Premature Infant recommended reference ranges:  Up to 24 hours.............<8.0 mg/dL  Up to 48 hours............<12.0 mg/dL  3-5 days..................<15.0 mg/dL  6-29 days.................<15.0 mg/dL         Site               BUN, Bld       6       [place] Cells               Calcium       7.5       Calcium, Ion       1.10       Chloride       103       CO2       27       Creatinine       0.6       DelSys               Differential Method               eGFR if        >60.0       eGFR if non        >60.0  Comment:  Calculation used to obtain the estimated glomerular filtration  rate (eGFR) is the CKD-EPI equation.          Eos #               Eosinophil%               Glucose       74       Gran # (ANC)               Gran%               Hematocrit               Hemoglobin               Hypo               Lactate, Dom       1.8  Comment:  Falsely low lactic acid results can be found in samples   containing >=13.0 mg/dL  total bilirubin and/or >=3.5 mg/dL   direct bilirubin.         Lymph #               Lymph%               Magnesium       2.0       MCH               MCHC               MCV               Mode               Mono #               Mono%               MPV               nRBC               Ovalocytes               Phosphorus       2.8       Platelet Estimate               Platelets               POC BE               POC HCO3               POC PCO2               POC PH               POC PO2               POC SATURATED O2               POC TCO2               POCT Glucose 142 87 86   83     Poik               Poly               Potassium       4.2       Total Protein       3.6       RBC               RDW               Sample               Sodium       135       Target Cells               WBC                                01/29/19  0731   01/29/19  0729   01/29/19  0611   01/29/19  0525   01/29/19  0403        Immature Granulocytes               Immature Grans (Abs)               Albumin               Alkaline Phosphatase               Allens Test               ALT               Anion Gap               Aniso               AST               Baso #               Basophil%               Total Bilirubin               Site               BUN, Bld               [place] Cells               Calcium               Calcium, Ion               Chloride               CO2               Creatinine               DelSys               Differential Method               eGFR if                eGFR if non                Eos #               Eosinophil%               Glucose               Gran # (ANC)               Gran%               Hematocrit               Hemoglobin               Hypo               Lactate, Dom               Lymph #               Lymph%               Magnesium               MCH               MCHC               MCV               Mode               Mono #               Mono%               MPV                nRBC               Ovalocytes               Phosphorus               Platelet Estimate               Platelets               POC BE               POC HCO3               POC PCO2               POC PH               POC PO2               POC SATURATED O2               POC TCO2               POCT Glucose 81 80 79 81 80     Poik               Poly               Potassium               Total Protein               RBC               RDW               Sample               Sodium               Target Cells               WBC                                01/29/19  0400   01/29/19  0314   01/29/19  0157   01/29/19  0105   01/29/19  0026        Immature Granulocytes 0.5             Immature Grans (Abs) 0.06  Comment:  Mild elevation in immature granulocytes is non specific and   can be seen in a variety of conditions including stress response,   acute inflammation, trauma and pregnancy. Correlation with other   laboratory and clinical findings is essential.               Albumin               Alkaline Phosphatase               Allens Test               ALT               Anion Gap               Aniso Slight             AST               Baso # 0.01             Basophil% 0.1             Total Bilirubin               Site               BUN, Bld               [place] Cells               Calcium               Calcium, Ion               Chloride               CO2               Creatinine               DelSys               Differential Method Automated             eGFR if                eGFR if non                Eos # 0.0             Eosinophil% 0.0             Glucose               Gran # (ANC) 10.6             Gran% 84.6             Hematocrit 23.4             Hemoglobin 8.0             Hypo Occasional             Lactate, Dom 2.2  Comment:  Falsely low lactic acid results can be found in samples   containing >=13.0 mg/dL total bilirubin and/or >=3.5 mg/dL   direct bilirubin.               Lymph #  0.7             Lymph% 5.4             Magnesium               MCH 26.2             MCHC 34.2             MCV 77             Mode               Mono # 1.2             Mono% 9.4             MPV SEE COMMENT  Comment:  Result not available.             nRBC 0             Ovalocytes Occasional             Phosphorus               Platelet Estimate               Platelets 26  Comment:  plt  critical result(s) called and verbal readback obtained from   SUKHWINDER CORNELIUSNURSE, 01/29/2019 05:56               POC BE               POC HCO3               POC PCO2               POC PH               POC PO2               POC SATURATED O2               POC TCO2               POCT Glucose   78 85 84 86     Poik Slight             Poly Occasional             Potassium               Total Protein               RBC 3.05             RDW 18.5             Sample               Sodium               Target Cells Occasional             WBC 12.56                              01/28/19  2346   01/28/19  2345   01/28/19  2318   01/28/19  2304   01/28/19  2219        Immature Granulocytes   0.5           Immature Grans (Abs)   0.07  Comment:  Mild elevation in immature granulocytes is non specific and   can be seen in a variety of conditions including stress response,   acute inflammation, trauma and pregnancy. Correlation with other   laboratory and clinical findings is essential.             Albumin   1.4           Alkaline Phosphatase   396           Allens Test               ALT   42           Anion Gap   6           Aniso   Slight           AST   117           Baso #   0.02           Basophil%   0.1           Total Bilirubin   2.6  Comment:  For infants and newborns, interpretation of results should be based  on gestational age, weight and in agreement with clinical  observations.  Premature Infant recommended reference ranges:  Up to 24 hours.............<8.0 mg/dL  Up to 48 hours............<12.0 mg/dL  3-5 days..................<15.0  mg/dL  6-29 days.................<15.0 mg/dL             Site               BUN, Bld   6           [place] Cells   Occasional           Calcium   7.6           Calcium, Ion   1.11           Chloride   105           CO2   26           Creatinine   0.6           DelSys               Differential Method   Automated           eGFR if    >60.0           eGFR if non    >60.0  Comment:  Calculation used to obtain the estimated glomerular filtration  rate (eGFR) is the CKD-EPI equation.              Eos #   0.0           Eosinophil%   0.0           Glucose   89           Gran # (ANC)   11.4           Gran%   85.1           Hematocrit   24.3           Hemoglobin   8.3           Hypo   Moderate           Lactate, Dom   2.6  Comment:  Falsely low lactic acid results can be found in samples   containing >=13.0 mg/dL total bilirubin and/or >=3.5 mg/dL   direct bilirubin.             Lymph #   0.8           Lymph%   5.6           Magnesium   2.0           MCH   26.9           MCHC   34.2           MCV   79           Mode               Mono #   1.2           Mono%   8.7           MPV   SEE COMMENT  Comment:  Result not available.           nRBC   0           Ovalocytes   Occasional           Phosphorus   2.7           Platelet Estimate   Decreased           Platelets   27  Comment:  Platelet critical result(s) called and verbal readback obtained from   Vianney Ritter RN., 01/29/2019 01:35             POC BE               POC HCO3               POC PCO2               POC PH               POC PO2               POC SATURATED O2               POC TCO2               POCT Glucose 94   108 64 88     Poik   Slight           Poly   CANCELED  Comment:  Result canceled by the ancillary.           Potassium   4.0           Total Protein   3.4           RBC   3.08           RDW   18.5           Sample               Sodium   137           Target Cells   Occasional           WBC   13.38                                 Significant Imaging: I have reviewed all pertinent imaging results/findings within the past 24 hours.

## 2019-01-29 NOTE — PROGRESS NOTES
Consult received for blister to abdomen and packing for RLQ wound.     Patient with prevena to the midline abdomen. Dressing loose at edge due to copious serous drainage from the large blister on the right abdomen. Loose drape was removed and strip of adryan applied and secured with new drape. Seal achieved. Patient will likely have difficulty maintaining seal particularly under the panus as skin is very weepy.    Wound to the right lower abdomen cleansed with normal saline and gauze. Base appears pinkish adipose tissue. No granular tissue pesent. Wound packed with aquacel ag and secured with border foam.   Blister to the right abdomen weeping. Area cleansed with normal saline and gauze, several Aquacel Sacral dressings cut to make larger dressing to accomodate the drainage.     Wound care to follow PRN.   Sharon Montoya RN [facility]   [phone]    Discussed with Dr Real difficulty with seal as well as blister and packing, recs approved.          01/29/19 1402       Incision/Site 01/25/19 1058 Right Abdomen   Date First Assessed/Time First Assessed: 01/25/19 1058   Side: Right  Location: Abdomen   Wound Image    Incision WDL ex   Dressing Appearance Moist drainage   Drainage Amount Moderate   Drainage Characteristics/Odor Serosanguineous   Appearance Adipose;Pink;Purple   Periwound Area Moist   Wound Edges Open   Wound Length (cm) 1 cm   Wound Width (cm) 5 cm   Wound Depth (cm) 4 cm   Wound Volume (cm^3) 20 cm^3   Wound Surface Area (cm^2) 5 cm^2   Care Cleansed with:;Sterile normal saline   Dressing Removed;Applied;Changed;Hydrofiber;Silver;Foam   Dressing Change Due 01/31/19       Negative Pressure Wound Therapy    Placement Date/Time: 01/25/19 1000   Location: Abdomen   Therapy Interventions NPWT Dressing reinforced       Wound 01/28/19 2300 Blister(s) lower Abdomen #3   Date First Assessed/Time First Assessed: 01/28/19 2300   Pre-existing: No  Primary Wound Type: Blister(s)  Side: Right  Orientation: lower   Location: Abdomen  Wound/PI Number (optional): #3   Wound Image    Wound WDL ex   Dressing Appearance Moist drainage   Drainage Amount Copious   Drainage Characteristics/Odor Serous   Appearance Moist;Pink   Tissue loss description Partial thickness   Periwound Area Denuded   Wound Edges Open;Irregular;Jagged   Wound Length (cm) 10 cm   Wound Width (cm) 20 cm   Wound Depth (cm) 0.1 cm   Wound Volume (cm^3) 20 cm^3   Wound Surface Area (cm^2) 200 cm^2   Care Cleansed with:;Sterile normal saline   Dressing Removed;Applied;Changed;Foam   Dressing Change Due 01/31/19

## 2019-01-29 NOTE — PLAN OF CARE
Problem: Occupational Therapy Goal  Goal: Occupational Therapy Goal  Goals to be met by: 2/12/19     Patient will increase functional independence with ADLs by performing:    Feeding with Set-up Assistance.  UE Dressing with Set-up Assistance.  LE Dressing with Moderate Assistance.  Grooming while seated with Set-up Assistance.  Toileting from bedside commode with Maximum Assistance for hygiene and clothing management.   Toilet transfer to bedside commode with Maximum Assistance.    Outcome: Ongoing (interventions implemented as appropriate)  OT eval completed, and above goals established. NAVI Duron  1/29/2019

## 2019-01-29 NOTE — NURSING
MD updated on patient status; remains tachycardic 110's-120's, hypertensive, intermittently agitated/disoriented; blood sugars 50's-100's requiring multiple IVP D50 doses.     Repeat labs sent, D10 gtt increased, will continue to monitor.

## 2019-01-29 NOTE — ASSESSMENT & PLAN NOTE
63 y.o. female with hx of asthma, COPD, CAD, DM, HTN, CVA 2012 with R side deficits, HFpEF, severe debility, lap converted to open appendectomy 8/2018 complicated by cdiff, failure to thrive.  Hx of difficult intubation and delayed emergence, CO2 narcosis needing ICU stay.  Hx of PRN O2 at night. Patient now s/p exploratory laparotomy, washout, and pelvic abscess drainage on 1/25/19.    Post-operatively, on initial evaluation in PACU, patient hypotensive to 60s/40s, on BiPap. Currently fluid resuscitating with 3rd liter of LR, responding well to phenylephrine, starting phenylephrine infusion (tachycardic to ~118). ABG 7.11/49/116/16, BE -14, on bipap 14/5, 35% FiO2. Intubated on 1/26 for airway protection. Required CRRT on 1/26 for metabolic acidosis, now off.     Neuro:  Sedation: None  Pain: dilaudid 0.5mg PRN     CV:  - HDS  - Off pressors  - Lactate 3.4 --> 2.3 --> 2.6    Pulm:   - Extubated 1/28/19  - on 2L NC  - CXR showing b/l pleural effusions, edema, atelectasis    Renal:  Trend BUN/Cr: <2/0.5  Monitor Urine output   Nephro following, appreciate assistance    ID:  AF  1/24 blood cultures: gram positive cocci in clusters resembling staph  1/25 blood cultures: NGTD  1/25 operative cultures pending, ngtd thus far  - on vanc/zosyn      FEN/GI:  TPN @ 75, on hold  Replace lytes PRN, aggressive repletion overnight, q8 labs  ppi  Lactate 2.6, continue to trend, q4h labs      Endo:  Patient with IDDM2, on detemir 20u BID at home  1/27-1/28 with hyperglycemia into the 500s on insulin gtt @ 100u/hr  1/28-1/29 with hypoglycemia requiring d10 infusion @ 125cc/hr    Heme:  Trend H&H : 9.3/27  Post-op hgb 8.5   Plt 42, holding heparin, HIT panel sent  Plt continues to downtrend, last 26  No signs of bleeding at this time    Dispo: Cont ICU care   Primary: SICU/general surgery

## 2019-01-29 NOTE — SUBJECTIVE & OBJECTIVE
Interval History: Spoke w/ family about abx plan.  Repeat Blood cxs negative.    Review of Systems   Unable to perform ROS: Other     Objective:     Vital Signs (Most Recent):  Temp: 98.9 °F (37.2 °C) (01/29/19 1500)  Pulse: 104 (01/29/19 1530)  Resp: 16 (01/29/19 1530)  BP: 138/67 (01/29/19 1500)  SpO2: 96 % (01/29/19 1530) Vital Signs (24h Range):  Temp:  [98 °F (36.7 °C)-98.9 °F (37.2 °C)] 98.9 °F (37.2 °C)  Pulse:  [] 104  Resp:  [8-34] 16  SpO2:  [94 %-100 %] 96 %  BP: (115-177)/() 138/67     Weight: 71.2 kg (157 lb)  Body mass index is 29.66 kg/m².    Estimated Creatinine Clearance: 86.7 mL/min (based on SCr of 0.6 mg/dL).    Physical Exam   Constitutional: She appears well-developed. She is sedated and intubated.   HENT:   Head: Normocephalic and atraumatic.   Eyes: Conjunctivae are normal. Right eye exhibits no discharge. Left eye exhibits no discharge.   Neck: Neck supple.   Cardiovascular: Normal rate and regular rhythm. Exam reveals no gallop and no friction rub.   No murmur heard.  Pulmonary/Chest: Breath sounds normal. She is intubated. She has no wheezes. She has no rales.   Abdominal:   Wound vac to RLQ   Musculoskeletal: She exhibits edema. She exhibits no deformity.   Skin: Skin is warm. She is not diaphoretic.       Significant Labs:   Blood Culture:   Recent Labs   Lab 12/13/18  0537 01/24/19  0833 01/24/19  0839 01/25/19  1752 01/26/19  0109   LABBLOO No growth after 5 days. Gram stain aer bottle: Gram positive cocci in clusters resembling Staph   Results called to and read back by: Micheline Martin RN 01/25/2019    03:43  METHICILLIN RESISTANT STAPHYLOCOCCUS AUREUS  ID consult required at [facility] and [facility].   Gram stain aer bottle: Gram positive cocci in clusters resembling Staph   Results called to and read back by: Micheline Montgomery RN 01/25/2019    02:39  METHICILLIN RESISTANT STAPHYLOCOCCUS AUREUS  ID consult required at [facility]  [place],[place],[facility] and [facility]   locations.  For susceptibility see order # [id number]   No Growth to date  No Growth to date  No Growth to date  No Growth to date No Growth to date  No Growth to date  No Growth to date  No Growth to date     BMP:   Recent Labs   Lab 01/29/19  0803   GLU 74   *   K 4.2      CO2 27   BUN 6*   CREATININE 0.6   CALCIUM 7.5*   MG 2.0     CBC:   Recent Labs   Lab 01/28/19  1555 01/28/19  2345 01/29/19  0400   WBC 13.25* 13.38* 12.56   HGB 8.3* 8.3* 8.0*   HCT 23.7* 24.3* 23.4*   PLT 32* 27* 26*     CMP:   Recent Labs   Lab 01/28/19  2147 01/28/19  2345 01/29/19  0803    137 135*   K 4.1 4.0 4.2    105 103   CO2 26 26 27   GLU 91 89 74   BUN 6* 6* 6*   CREATININE 0.6 0.6 0.6   CALCIUM 7.7* 7.6* 7.5*   PROT 3.4* 3.4* 3.6*   ALBUMIN 1.4* 1.4* 1.7*   BILITOT 2.6* 2.6* 2.7*   ALKPHOS 398* 396* 338*   * 117* 108*   ALT 39 42 48*   ANIONGAP 5* 6* 5*   EGFRNONAA >60.0 >60.0 >60.0     Wound Culture:   Recent Labs   Lab 08/26/18  1015 09/05/18  1246 09/10/18  1817 01/25/19  1000 01/25/19  1002   LABAERO ESCHERICHIA COLI  Moderate    ENTEROCOCCUS RAFFINOSUS  Moderate   METHICILLIN RESISTANT STAPHYLOCOCCUS AUREUS  Few    METHICILLIN RESISTANT STAPHYLOCOCCUS AUREUS  Moderate   METHICILLIN RESISTANT STAPHYLOCOCCUS AUREUS  Moderate  Skin david also present   No growth No growth     All pertinent labs within the past 24 hours have been reviewed.  Recent Lab Results  (Last 25 results in the past 24 hours)      01/29/19  1644   01/29/19  1402   01/29/19  1307   01/29/19  1208   01/29/19  1206        Immature Granulocytes               Immature Grans (Abs)               Albumin               Alkaline Phosphatase               Allens Test               ALT               Anion Gap               Aniso               AST               Baso #               Basophil%               Total Bilirubin               Site               BUN, Bld               [place] Cells                Calcium               Calcium, Ion               Chloride               CO2               Creatinine               DelSys               Differential Method               eGFR if                eGFR if non                Eos #               Eosinophil%               Glucose               Gran # (ANC)               Gran%               Hematocrit               Hemoglobin               Hypo               Lactate, Dom         2.1  Comment:  Falsely low lactic acid results can be found in samples   containing >=13.0 mg/dL total bilirubin and/or >=3.5 mg/dL   direct bilirubin.       Lymph #               Lymph%               Magnesium               MCH               MCHC               MCV               Mode               Mono #               Mono%               MPV               nRBC               Ovalocytes               Phosphorus               Platelet Estimate               Platelets               POC BE               POC HCO3               POC PCO2               POC PH               POC PO2               POC SATURATED O2               POC TCO2               POCT Glucose 149 127 130 139       Poik               Poly               Potassium               Total Protein               RBC               RDW               Sample               Sodium               Target Cells               WBC                                01/29/19  1117   01/29/19  0956   01/29/19  0911   01/29/19  0803   01/29/19  0801        Immature Granulocytes               Immature Grans (Abs)               Albumin       1.7       Alkaline Phosphatase       338       Allens Test               ALT       48       Anion Gap       5       Aniso               AST       108       Baso #               Basophil%               Total Bilirubin       2.7  Comment:  For infants and newborns, interpretation of results should be based  on gestational age, weight and in agreement with clinical  observations.  Premature  Infant recommended reference ranges:  Up to 24 hours.............<8.0 mg/dL  Up to 48 hours............<12.0 mg/dL  3-5 days..................<15.0 mg/dL  6-29 days.................<15.0 mg/dL         Site               BUN, Bld       6       [place]rie Cells               Calcium       7.5       Calcium, Ion       1.10       Chloride       103       CO2       27       Creatinine       0.6       DelSys               Differential Method               eGFR if        >60.0       eGFR if non        >60.0  Comment:  Calculation used to obtain the estimated glomerular filtration  rate (eGFR) is the CKD-EPI equation.          Eos #               Eosinophil%               Glucose       74       Gran # (ANC)               Gran%               Hematocrit               Hemoglobin               Hypo               Lactate, Dom       1.8  Comment:  Falsely low lactic acid results can be found in samples   containing >=13.0 mg/dL total bilirubin and/or >=3.5 mg/dL   direct bilirubin.         Lymph #               Lymph%               Magnesium       2.0       MCH               MCHC               MCV               Mode               Mono #               Mono%               MPV               nRBC               Ovalocytes               Phosphorus       2.8       Platelet Estimate               Platelets               POC BE               POC HCO3               POC PCO2               POC PH               POC PO2               POC SATURATED O2               POC TCO2               POCT Glucose 142 87 86   83     Poik               Poly               Potassium       4.2       Total Protein       3.6       RBC               RDW               Sample               Sodium       135       Target Cells               WBC                                01/29/19  0731   01/29/19  0729   01/29/19  0611   01/29/19  0525   01/29/19  0403        Immature Granulocytes               Immature Grans (Abs)               Albumin                Alkaline Phosphatase               Allens Test               ALT               Anion Gap               Aniso               AST               Baso #               Basophil%               Total Bilirubin               Site               BUN, Bld               Cherie Cells               Calcium               Calcium, Ion               Chloride               CO2               Creatinine               DelSys               Differential Method               eGFR if                eGFR if non                Eos #               Eosinophil%               Glucose               Gran # (ANC)               Gran%               Hematocrit               Hemoglobin               Hypo               Lactate, Dom               Lymph #               Lymph%               Magnesium               MCH               MCHC               MCV               Mode               Mono #               Mono%               MPV               nRBC               Ovalocytes               Phosphorus               Platelet Estimate               Platelets               POC BE               POC HCO3               POC PCO2               POC PH               POC PO2               POC SATURATED O2               POC TCO2               POCT Glucose 81 80 79 81 80     Poik               Poly               Potassium               Total Protein               RBC               RDW               Sample               Sodium               Target Cells               WBC                                01/29/19  0400   01/29/19  0314   01/29/19  0157   01/29/19  0105   01/29/19  0026        Immature Granulocytes 0.5             Immature Grans (Abs) 0.06  Comment:  Mild elevation in immature granulocytes is non specific and   can be seen in a variety of conditions including stress response,   acute inflammation, trauma and pregnancy. Correlation with other   laboratory and clinical findings is essential.               Albumin                Alkaline Phosphatase               Allens Test               ALT               Anion Gap               Aniso Slight             AST               Baso # 0.01             Basophil% 0.1             Total Bilirubin               Site               BUN, Bld               [place] Cells               Calcium               Calcium, Ion               Chloride               CO2               Creatinine               DelSys               Differential Method Automated             eGFR if                eGFR if non                Eos # 0.0             Eosinophil% 0.0             Glucose               Gran # (ANC) 10.6             Gran% 84.6             Hematocrit 23.4             Hemoglobin 8.0             Hypo Occasional             Lactate, Dom 2.2  Comment:  Falsely low lactic acid results can be found in samples   containing >=13.0 mg/dL total bilirubin and/or >=3.5 mg/dL   direct bilirubin.               Lymph # 0.7             Lymph% 5.4             Magnesium               MCH 26.2             MCHC 34.2             MCV 77             Mode               Mono # 1.2             Mono% 9.4             MPV SEE COMMENT  Comment:  Result not available.             nRBC 0             Ovalocytes Occasional             Phosphorus               Platelet Estimate               Platelets 26  Comment:  plt  critical result(s) called and verbal readback obtained from   SUKHWINDER CORNELIUS,NURSE, 01/29/2019 05:56               POC BE               POC HCO3               POC PCO2               POC PH               POC PO2               POC SATURATED O2               POC TCO2               POCT Glucose   78 85 84 86     Poik Slight             Poly Occasional             Potassium               Total Protein               RBC 3.05             RDW 18.5             Sample               Sodium               Target Cells Occasional             WBC 12.56                              01/28/19  2346   01/28/19  2345    01/28/19  2318   01/28/19  2304   01/28/19  2219        Immature Granulocytes   0.5           Immature Grans (Abs)   0.07  Comment:  Mild elevation in immature granulocytes is non specific and   can be seen in a variety of conditions including stress response,   acute inflammation, trauma and pregnancy. Correlation with other   laboratory and clinical findings is essential.             Albumin   1.4           Alkaline Phosphatase   396           Allens Test               ALT   42           Anion Gap   6           Aniso   Slight           AST   117           Baso #   0.02           Basophil%   0.1           Total Bilirubin   2.6  Comment:  For infants and newborns, interpretation of results should be based  on gestational age, weight and in agreement with clinical  observations.  Premature Infant recommended reference ranges:  Up to 24 hours.............<8.0 mg/dL  Up to 48 hours............<12.0 mg/dL  3-5 days..................<15.0 mg/dL  6-29 days.................<15.0 mg/dL             Site               BUN, Bld   6           [place] Cells   Occasional           Calcium   7.6           Calcium, Ion   1.11           Chloride   105           CO2   26           Creatinine   0.6           DelSys               Differential Method   Automated           eGFR if    >60.0           eGFR if non    >60.0  Comment:  Calculation used to obtain the estimated glomerular filtration  rate (eGFR) is the CKD-EPI equation.              Eos #   0.0           Eosinophil%   0.0           Glucose   89           Gran # (ANC)   11.4           Gran%   85.1           Hematocrit   24.3           Hemoglobin   8.3           Hypo   Moderate           Lactate, Dom   2.6  Comment:  Falsely low lactic acid results can be found in samples   containing >=13.0 mg/dL total bilirubin and/or >=3.5 mg/dL   direct bilirubin.             Lymph #   0.8           Lymph%   5.6           Magnesium   2.0           MCH   26.9            MCHC   34.2           MCV   79           Mode               Mono #   1.2           Mono%   8.7           MPV   SEE COMMENT  Comment:  Result not available.           nRBC   0           Ovalocytes   Occasional           Phosphorus   2.7           Platelet Estimate   Decreased           Platelets   27  Comment:  Platelet critical result(s) called and verbal readback obtained from   Vianney Ritter RN., 01/29/2019 01:35             POC BE               POC HCO3               POC PCO2               POC PH               POC PO2               POC SATURATED O2               POC TCO2               POCT Glucose 94   108 64 88     Poik   Slight           Poly   CANCELED  Comment:  Result canceled by the ancillary.           Potassium   4.0           Total Protein   3.4           RBC   3.08           RDW   18.5           Sample               Sodium   137           Target Cells   Occasional           WBC   13.38                                Significant Imaging: I have reviewed all pertinent imaging results/findings within the past 24 hours.

## 2019-01-29 NOTE — ASSESSMENT & PLAN NOTE
BG goal 140 - 180     Continue IV insulin infusion protocol  Requires intensive BG monitoring while on protocol. Change to every 2 hours.      ** Please notify Endocrine for any change and/or advance in diet**    Discharge planning: TBD

## 2019-01-29 NOTE — PT/OT/SLP EVAL
Occupational Therapy   Evaluation    Name: Sheryl Martines  MRN: [id number]  Admitting Diagnosis:  Pelvic abscess in female s/p exp lap for washout and drainage 4 Days Post-Op    Recommendations:     Discharge Recommendations: nursing facility, skilled  Discharge Equipment Recommendations:  (TBD closer to d/c)  Barriers to discharge:  Decreased caregiver support    Assessment:     Sheryl Martines is a 63 y.o. female with a medical diagnosis of Pelvic abscess in female.  She presents s/p exp-lap. Performance deficits affecting function: impaired self care skills, impaired balance, weakness, impaired endurance, impaired functional mobilty, decreased upper extremity function, decreased lower extremity function, gait instability, pain, impaired cardiopulmonary response to activity, edema, impaired skin.      Rehab Prognosis: Fair; patient would benefit from acute skilled OT services to address these deficits and reach maximum level of function.       Plan:     Patient to be seen 2 x/week to address the above listed problems via self-care/home management, therapeutic activities, therapeutic exercises  · Plan of Care Expires: 02/28/19  · Plan of Care Reviewed with: patient, daughter    Subjective     Chief Complaint: weakness/generalized pain  Patient/Family Comments/goals: to get better and go home    Occupational Profile:  Living Environment: pt lives with dtr in [facility] with ramp access; tub/shower combo  Previous level of function: pt required A with dressing/toileting/bathing; pt able to feed self and perform grooming tasks from w/c level; pt required total A for w/c and bedside commode transfers  Roles and Routines: mother  Equipment Used at Home:  wheelchair, walker, rolling, shower chair, rollator, bedside commode  Assistance upon Discharge: pt's dtr works during the day and comes home for lunch to check on pt and assist her back to bed    Pain/Comfort:  · Pain Rating 1: (pt groaning, but unable to rate or localize  pain)  · Pain Addressed 1: Reposition, Distraction, Nurse notified    Patients cultural, spiritual, Buddhist conflicts given the current situation: no    Objective:     Communicated with: RN prior to session.  Patient found supine with blood pressure cuff, pulse ox (continuous), telemetry, central line, nieves catheter, wound vac(trialysis catheter) upon OT entry to room.    General Precautions: Standard, fall   Orthopedic Precautions:    Braces:       Occupational Performance:    Bed Mobility:    · Patient completed Rolling/Turning to Left with  total assistance x 2  · Patient completed Rolling/Turning to Right with total assistance x 2  · Patient completed Scooting/Bridging with total assistance x 2  · Patient completed Supine to Sit with total assistance x 2  · Patient completed Sit to Supine with total assistance x 2    Functional Mobility/Transfers:  · TN  · Functional Mobility: NT    Activities of Daily Living:  · Grooming: maximal assistance    · Upper Body Dressing: total assistance    · Lower Body Dressing: total assistance    · Toileting: total assistance      Cognitive/Visual Perceptual:  Pt is alert and following commands  Pt with poor verbal articulation, thus difficult to determine orientation     Physical Exam:  ROM WFL elbow and below in B UE noted through observation  Pt leans on R elbow in sitting which dtr states is premorbid 2* spinal fracture    AMPAC 6 Click ADL:  AMPAC Total Score: 7    Treatment & Education:  Pt/dtr ed on OT POC  Pt sat EOB x 3 minutes with max<>CGA for balance  Pt rolled multiple times for linen change with total A x 2  Education:    Patient left right sidelying with all lines intact, call button in reach, RN notified and daughters present    GOALS:   Multidisciplinary Problems     Occupational Therapy Goals        Problem: Occupational Therapy Goal    Goal Priority Disciplines Outcome Interventions   Occupational Therapy Goal     OT, PT/OT Ongoing (interventions  implemented as appropriate)    Description:  Goals to be met by: 19     Patient will increase functional independence with ADLs by performing:    Feeding with Set-up Assistance.  UE Dressing with Set-up Assistance.  LE Dressing with Moderate Assistance.  Grooming while seated with Set-up Assistance.  Toileting from bedside commode with Maximum Assistance for hygiene and clothing management.   Toilet transfer to bedside commode with Maximum Assistance.                      History:     Past Medical History:   Diagnosis Date    Abnormal LFTs 2018    Asthma     Closed compression fracture of fourth lumbar vertebra     COPD (chronic obstructive pulmonary disease)     Coronary artery disease     Diabetes mellitus     Fall 10/4/2018    Fracture     right tib & fib    Glaucoma     High cholesterol     Hypertension     Infected incision 2018    Intractable nausea and vomiting 2019    Iritis     Pulmonary embolus     S/P appendectomy 2018    Stroke     rt sided weakness.       Past Surgical History:   Procedure Laterality Date    ABDOMINAL SURGERY      APPENDECTOMY N/A 2018    [facility]    APPENDECTOMY, LAPAROSCOPIC---CONVERTED TO OPEN APPENDECTOMY @0950 N/A 2018    [facility] OR    APPLICATION, WOUND VAC N/A 2019    Performed by Monster Laurent MD at [facility] OR [address] [place] [address]    BACK SURGERY      stimulator    CATARACT EXTRACTION      EXCISION, ABSCESS- drainage abdominal wall abscess N/A 2019    Performed by Monster Laurent MD at [facility] OR 2ND FLR    HYSTERECTOMY      INCISION AND DRAINAGE, ABSCESS-  drainage of pelvic abscess N/A 2019    Performed by Monster Laurent MD at [facility] OR 2ND FLR    LAPAROTOMY, EXPLORATORY N/A 2019    Performed by Monster Laurent MD at [facility] OR 2ND FLR    TOTAL HIP ARTHROPLASTY Left            Clinical Decision Makin.  OT  "Mod:  "Pt evaluation falls under moderate complexity for evaluation coding due to identification of 3-5 performance deficits noted as stated above. Eval required Min/Mod assistance to complete on this date and detailed assessment(s) were utilized. Moreover, an expanded review of history and occupational profile obtained with additional review of cognitive, physical and psychosocial hx."     Time Tracking:     OT Date of Treatment: 01/29/19  OT Start Time: 0922  OT Stop Time: 0951  OT Total Time (min): 29 min    Billable Minutes:Evaluation 15  Self Care/Home Management 10    NAVI Duron  1/29/2019    "

## 2019-01-29 NOTE — PROGRESS NOTES
"[facility]  Endocrinology  Progress Note    Admit Date: 1/23/2019     Reason for Consult: Management of T2DM, Hyperglycemia     Surgical Procedure and Date:      LAPAROTOMY, EXPLORATORY (N/A)     INCISION AND DRAINAGE, ABSCESS-  drainage of pelvic abscess (N/A)     EXCISION, ABSCESS- drainage abdominal wall abscess (N/A)     APPLICATION, WOUND VAC (N/A)       Diabetes diagnosis year: 20 years ago    Home Diabetes Medications:       Levemir 20 units BID     Humalog 16 units TIDWM with correction     How often checking glucose at home? >4 x day   BG readings on regimen: 110's  Hypoglycemia on the regimen?  Yes - 40's  Missed doses on regimen?  No    Diabetes Complications include:     Hypoglycemia     Complicating diabetes co morbidities:   HLD, CVA, and CAD    Lab Results   Component Value Date    HGBA1C 4.7 01/24/2019       HPI:   Patient is a 63 y.o. female with a diagnosis of sepsis and lactic acidosis secondary to abdominal abscess. Also has diagnosis of COPD, asthma, CAD, CVA (2012), and DM2. Patient receives primary care for DM in [place]. Her primary care giver is her daughter. Patient's DM is well controlled by primary Endocrinologist in [place]. Patient has not been on insulin regimen for the past 3 weeks leading up to hospitalization. According to daughter, patient was not eating, and having low BG reading. Therefore, insulin regimen was stopped by patient's primary Endocrinologist. [place] [facility] [place].             Interval HPI:   Overnight events:    Remains in SICU. BG is now below goal without need for IV insulin infusion. Remains on steroid therapy.  Is now also on D10 infusion.     Eating:   NPO  Nausea: No  Hypoglycemia and intervention: YES. Requires D10 infusion   Fever: No  TPN and/or TF: No  If yes, type of TF/TPN and rate: None    BP (!) 148/86   Pulse 94   Temp 98.2 °F (36.8 °C) (Oral)   Resp 15   Ht 5' 1" (1.549 m)   Wt 71.2 kg (157 " lb)   LMP  (LMP Unknown)   SpO2 100%   Breastfeeding? No   BMI 29.66 kg/m²      Labs Reviewed and Include    Recent Labs   Lab 01/29/19  0803   GLU 74   CALCIUM 7.5*   ALBUMIN 1.7*   PROT 3.6*   *   K 4.2   CO2 27      BUN 6*   CREATININE 0.6   ALKPHOS 338*   ALT 48*   *   BILITOT 2.7*     Lab Results   Component Value Date    WBC 12.56 01/29/2019    HGB 8.0 (L) 01/29/2019    HCT 23.4 (L) 01/29/2019    MCV 77 (L) 01/29/2019    PLT 26 (LL) 01/29/2019     No results for input(s): TSH, FREET4 in the last 168 hours.  Lab Results   Component Value Date    HGBA1C 4.7 01/24/2019       Nutritional status:   Body mass index is 29.66 kg/m².  Lab Results   Component Value Date    ALBUMIN 1.7 (L) 01/29/2019    ALBUMIN 1.4 (L) 01/28/2019    ALBUMIN 1.4 (L) 01/28/2019     Lab Results   Component Value Date    PREALBUMIN 3 (L) 01/28/2019    PREALBUMIN 5 (L) 01/24/2019    PREALBUMIN 20 10/05/2018       Estimated Creatinine Clearance: 86.7 mL/min (based on SCr of 0.6 mg/dL).    Accu-Checks  Recent Labs     01/29/19  0157 01/29/19  0314 01/29/19  0403 01/29/19  0525 01/29/19  0611 01/29/19  0729 01/29/19  0731 01/29/19  0801 01/29/19  0911 01/29/19  0956   POCTGLUCOSE 85 78 80 81 79 80 81 83 86 87       Current Medications and/or Treatments Impacting Glycemic Control  Immunotherapy:    Immunosuppressants     None        Steroids:   Hormones (From admission, onward)    Start     Stop Route Frequency Ordered    01/28/19 1400  hydrocortisone sodium succinate injection 50 mg      -- IV Every 8 hours 01/28/19 0905        Pressors:    Autonomic Drugs (From admission, onward)    None        Hyperglycemia/Diabetes Medications:   Antihyperglycemics (From admission, onward)    Start     Stop Route Frequency Ordered    01/27/19 1400  insulin regular (Humulin R) 100 Units in sodium chloride 0.9% 100 mL infusion     Question:  Insulin Rate Adjustment (DO NOT MODIFY ANSWER)  Answer:   [email or web address]    -- IV Continuous 01/27/19 1254          ASSESSMENT and PLAN    * Pelvic abscess in female    Managed per primary team  Avoid hypoglycemia  Optimize BG control to improve wound healing         Insulin dependent diabetes mellitus    BG goal 140 - 180     Continue IV insulin infusion protocol  Requires intensive BG monitoring while on protocol.    ** Please notify Endocrine for any change and/or advance in diet**    Discharge planning: TBD         Adverse effect of adrenal cortical steroids, sequela    On steroid therapy per primary team; may elevate BG readings         CAD (coronary artery disease)    Managed per primary team  Condition may cause insulin resistance          Overweight (BMI 25.0-29.9)    Body mass index is 29.66 kg/m².  may contribute to insulin resistance         NIKHIL (acute kidney injury)      Caution with insulin stacking           Fredrick Ruiz NP  Endocrinology  [facility]

## 2019-01-29 NOTE — PROGRESS NOTES
Dr. Laurent at bedside. Orders received to order lasix 40 one time. HR noted to be 180. Dr. Mackay notified. Stat EKG ordered.

## 2019-01-29 NOTE — ASSESSMENT & PLAN NOTE
64 yo female presenting with abdominal pain, nausea, elevated lactate s/p open drainage pelvic abscess 1/25 now with severe sepsis, lactic acidosis    -cont broad spec abx, f/u cultures- NGTD besides blood cx from admit staph x2, will switch from zosyn to cipro/flagyl given thrombocytopenia  -acidosis resolved, lactate still mildly elevated cont to trend

## 2019-01-29 NOTE — PROGRESS NOTES
"[facility]  General Surgery  Progress Note    Subjective:     History of Present Illness:  62 yo W with a history of HTN, COPD on home oxygen, HFpEF, IDDMII, CVA on plavix, HTN, debility after fall and broken hip, and PE who presents to the ED with a several month history of nausea, vomiting, inability to tolerate a diet and weight loss. She has had multiple admissions in the past few months for different ailments. She presents today with continued nausea, vomiting and abdominal pain that is mostly worse in the RLQ. During the examination, she states her pain was all over her abdomen because she was retching so much. She states that she has lost close to 40lbs since her surgery and that she only able to keep broth down. She had a lap converted to open appendectomy back in August 2018 that was complicated by a wound infection, C diff and failure to thrive. This seems to persists. She is now wheelchair bound (per her) and apparently lives in [place] but is here in [place] with her daughter.    She had a CT scan in the ED which revealed an irregular shaped rim enhancing fluid collection measuring at least 4.0 x 3.0 cm ight hemipelvis at the site of prior appendectomy. Surgery was consulted for further recommendations. She was also noted to have a "knot" at the RLQ incision site that is also tender.    Post-Op Info:  Procedure(s) (LRB):  LAPAROTOMY, EXPLORATORY (N/A)  INCISION AND DRAINAGE, ABSCESS-  drainage of pelvic abscess (N/A)  EXCISION, ABSCESS- drainage abdominal wall abscess (N/A)  APPLICATION, WOUND VAC (N/A)   4 Days Post-Op     Interval History: Extubated yesterday. HDS o/n. Dec UOP, <800 ml/h24h. Inc serous drainage from LUCIANO, 860 ml/24 hours.     Medications:  Continuous Infusions:   dextrose 10 % in water (D10W) 125 mL/hr at 01/29/19 1023    insulin (HUMAN R) infusion (adults) Stopped (01/28/19 1400)     Scheduled Meds:   albuterol-ipratropium  3 mL Nebulization Q6H    " ciprofloxacin  400 mg Intravenous Q12H    fluticasone-vilanterol  1 puff Inhalation Daily    hydrocortisone sodium succinate  50 mg Intravenous Q8H    metronidazole  500 mg Intravenous Q8H    pantoprozole (PROTONIX) IV  40 mg Intravenous Q12H    pyridoxine (vitamin B6)  50 mg Oral Daily    scopolamine  1 patch Transdermal Q3 Days    vancomycin (VANCOCIN) IVPB  1,000 mg Intravenous Q24H     PRN Meds:sodium chloride, albuterol sulfate, dextrose 50 % in water (D50W), dextrose 50 % in water (D50W), dextrose 50%, dextrose 50%, diclofenac sodium, diphenhydrAMINE, glucagon (human recombinant), glucose, glucose, HYDROmorphone, ondansetron, promethazine (PHENERGAN) IVPB, sodium chloride 0.9%, sodium chloride 0.9%     Review of patient's allergies indicates:   Allergen Reactions    Ace inhibitors Swelling    Carvedilol      Other reaction(s): Other (See Comments)  blister    Hydralazine analogues     Metformin Nausea And Vomiting    Tetracycline Itching    Tetracyclines Swelling    Travatan (with benzalkonium) [travoprost (benzalkonium)]     Travoprost Itching     Other reaction(s): Other (See Comments)  Blurry vision     Objective:     Vital Signs (Most Recent):  Temp: 98 °F (36.7 °C) (01/29/19 0700)  Pulse: 100 (01/29/19 1045)  Resp: 15 (01/29/19 1045)  BP: 132/83 (01/29/19 1000)  SpO2: 98 % (01/29/19 1045) Vital Signs (24h Range):  Temp:  [98 °F (36.7 °C)-98.7 °F (37.1 °C)] 98 °F (36.7 °C)  Pulse:  [] 100  Resp:  [6-34] 15  SpO2:  [94 %-100 %] 98 %  BP: ()/() 132/83  Arterial Line BP: ()/() 91/75     Weight: 71.2 kg (157 lb)  Body mass index is 29.66 kg/m².    Intake/Output - Last 3 Shifts       01/27 0700 - 01/28 0659 01/28 0700 - 01/29 0659 01/29 0700 - 01/30 0659    I.V. (mL/kg) 2571.8 (36.1) 2540.4 (35.7)     Blood       NG/ 60     IV Piggyback 2650 1550     TPN 1413.4      Total Intake(mL/kg) 6830.1 (95.9) 4150.4 (58.3)     Urine (mL/kg/hr) 990 (0.6) 781 (0.5) 660  (2.1)    Drains 223 860 120    Other 347 300     Total Output 1560 1941 780    Net +5270.1 +2209.4 -780                 Physical Exam   Constitutional: No distress.   HENT:   Head: Normocephalic and atraumatic.   Pulmonary/Chest: Effort normal. No respiratory distress.   Abdominal:   Abdomen soft, midline incision with prevena vac, pelvic drain serous  RLQ former incision abscess site clean  Area RLQ above incision with denuded skin (prev blister)   Neurological: She is alert.   Skin: Skin is warm and dry. She is not diaphoretic.       Significant Labs:  CBC:   Recent Labs   Lab 01/29/19  0400   WBC 12.56   RBC 3.05*   HGB 8.0*   HCT 23.4*   PLT 26*   MCV 77*   MCH 26.2*   MCHC 34.2     BMP:   Recent Labs   Lab 01/29/19  0803   GLU 74   *   K 4.2      CO2 27   BUN 6*   CREATININE 0.6   CALCIUM 7.5*   MG 2.0     ABGs:   Recent Labs   Lab 01/28/19  0816   PH 7.367   PCO2 43.8   PO2 98   HCO3 25.2   POCSATURATED 97   BE 0     Lactate 3.9      Assessment/Plan:     Depression    Hold home meds     Hypophosphatemia    Replace prn     Debility    PT/OT     Hypokalemia    Replace prn     Generalized abdominal pain    62 yo female presenting with abdominal pain, nausea, elevated lactate s/p open drainage pelvic abscess 1/25 now with severe sepsis, lactic acidosis    -cont broad spec abx, f/u cultures- NGTD besides blood cx from admit staph x2, will switch from zosyn to cipro/flagyl given thrombocytopenia  -acidosis resolved, lactate still mildly elevated cont to trend     Moderate malnutrition    -restart TPN     Essential hypertension    Hold home meds for now     Gastroesophageal reflux disease without esophagitis    ppi     (HFpEF) heart failure with preserved ejection fraction    Last echo 8/2018 with no WMAs, grade 1DD, EF 60  - strict I/O, daily weights  -start diuresis today     Moderate asthma without complication    -sched duonebs     CAD (coronary artery disease)    - cards consulted for EKG changes,  mild elevation in troponin, type II MI, on ASA/clopidogrel- now holding for thrombocytopenia     Insulin dependent diabetes mellitus    SSI     f/u HIT panel, stop plavix/asa/zosyn for thrombocytopenia    Sierra Real MD  General Surgery  [facility]

## 2019-01-29 NOTE — PROGRESS NOTES
[facility]  Critical Care - Surgery  Progress Note    Patient Name: Sheryl Martines  MRN: [id number]  Admission Date: 1/23/2019  Hospital Length of Stay: 5 days  Code Status: Full Code  Attending Provider: Monster Laurent MD  Primary Care Provider: Primary Doctor No   Principal Problem: Pelvic abscess in female    Subjective:     Hospital/ICU Course:  No notes on file    Interval History/Significant Events: NAEON. Yesterday, patient became hypoglycemic requiring multiple d5 pushes. Insulin gtt off, started d10 infusion. Now stable. Platelets continue to trend down despite stopping heparin, HIT panel sent. No clinical signs of bleeding. Suspect zosyn or vanc - will touch base with ID today. Tachycardic overnight, though she appears clinically very edematous, with pulmonary edema on CXR. Trial of 250cc colloid per primary team. Otherwise HDS. Extubated yesterday, on 4L NC. Plan to restart TPN today.    Follow-up For: Procedure(s) (LRB):  LAPAROTOMY, EXPLORATORY (N/A)  INCISION AND DRAINAGE, ABSCESS-  drainage of pelvic abscess (N/A)  EXCISION, ABSCESS- drainage abdominal wall abscess (N/A)  APPLICATION, WOUND VAC (N/A)    Post-Operative Day: 3 Days Post-Op    Objective:     Vital Signs (Most Recent):  Temp: 98.2 °F (36.8 °C) (01/29/19 0300)  Pulse: (!) 111 (01/29/19 0530)  Resp: 11 (01/29/19 0530)  BP: 120/64 (01/29/19 0530)  SpO2: 100 % (01/29/19 0530) Vital Signs (24h Range):  Temp:  [97.8 °F (36.6 °C)-98.7 °F (37.1 °C)] 98.2 °F (36.8 °C)  Pulse:  [] 111  Resp:  [11-33] 11  SpO2:  [94 %-100 %] 100 %  BP: ()/() 120/64  Arterial Line BP: ()/() 91/75     Weight: 71.2 kg (157 lb)  Body mass index is 29.66 kg/m².      Intake/Output Summary (Last 24 hours) at 1/29/2019 0649  Last data filed at 1/29/2019 0600  Gross per 24 hour   Intake 4150.42 ml   Output 1941 ml   Net 2209.42 ml       Physical Exam   Constitutional: She appears well-developed and well-nourished. No  distress.   HENT:   Head: Normocephalic.   Eyes: Pupils are equal, round, and reactive to light.   Cardiovascular: Regular rhythm.   Tachycardic   Pulmonary/Chest: No respiratory distress. She has no wheezes.   Intubated, mechanical breath sounds   Abdominal: Soft. She exhibits no distension.   Neurological:   lethargic   Skin: Skin is warm and dry.       Vents:  Vent Mode: A/C (01/29/19 0140)  Ventilator Initiated: Yes (01/26/19 0239)  Set Rate: 15 bmp (01/29/19 0140)  Vt Set: 450 mL (01/29/19 0140)  Pressure Support: 5 cmH20 (01/29/19 0140)  PEEP/CPAP: 5 cmH20 (01/28/19 1050)  Oxygen Concentration (%): 100 (01/29/19 0140)  Peak Airway Pressure: 0 cmH2O (01/29/19 0140)  Plateau Pressure: 0 cmH20 (01/29/19 0140)  Total Ve: 0 mL (01/29/19 0140)  F/VT Ratio<105 (RSBI): (!) 26.57 (01/28/19 1050)    Lines/Drains/Airways     Central Venous Catheter Line                 Port A Cath Single Lumen 01/23/19 1400 right subclavian 5 days         Percutaneous Central Line Insertion/Assessment - triple lumen  01/26/19 0010 left internal jugular 3 days         Trialysis (Dialysis) Catheter 01/26/19 1200 right internal jugular 2 days          Drain                 Closed/Suction Drain 01/25/19 1020 Right;Ventral Abdomen Bulb 19 Fr. 3 days         Urethral Catheter 01/25/19 0934 Straight-tip;Non-latex 16 Fr. 3 days          Pressure Ulcer                 Negative Pressure Wound Therapy  3 days                Significant Labs:    CBC/Anemia Profile:  Recent Labs   Lab 01/28/19  1555 01/28/19  2345 01/29/19  0400   WBC 13.25* 13.38* 12.56   HGB 8.3* 8.3* 8.0*   HCT 23.7* 24.3* 23.4*   PLT 32* 27* 26*   MCV 77* 79* 77*   RDW 18.6* 18.5* 18.5*        Chemistries:  Recent Labs   Lab 01/28/19  1555 01/28/19  2147 01/28/19  2345    137 137   K 4.1 4.1 4.0    106 105   CO2 26 26 26   BUN 6* 6* 6*   CREATININE 0.5 0.6 0.6   CALCIUM 7.6* 7.7* 7.6*   ALBUMIN 1.6* 1.4* 1.4*   PROT 3.5* 3.4* 3.4*   BILITOT 2.7* 2.6* 2.6*   ALKPHOS  407* 398* 396*   ALT 31 39 42   AST 98* 111* 117*   MG 1.8 1.6 2.0   PHOS 2.0* 2.7 2.7       All pertinent labs within the past 24 hours have been reviewed.    Significant Imaging:  I have reviewed all pertinent imaging results/findings within the past 24 hours.    Assessment/Plan:     * Pelvic abscess in female    63 y.o. female with hx of asthma, COPD, CAD, DM, HTN, CVA 2012 with R side deficits, HFpEF, severe debility, lap converted to open appendectomy 8/2018 complicated by cdiff, failure to thrive.  Hx of difficult intubation and delayed emergence, CO2 narcosis needing ICU stay.  Hx of PRN O2 at night. Patient now s/p exploratory laparotomy, washout, and pelvic abscess drainage on 1/25/19.    Post-operatively, on initial evaluation in PACU, patient hypotensive to 60s/40s, on BiPap. Currently fluid resuscitating with 3rd liter of LR, responding well to phenylephrine, starting phenylephrine infusion (tachycardic to ~118). ABG 7.11/49/116/16, BE -14, on bipap 14/5, 35% FiO2. Intubated on 1/26 for airway protection. Required CRRT on 1/26 for metabolic acidosis, now off.     Neuro:  Sedation: None  Pain: dilaudid 0.5mg PRN     CV:  - HDS  - Off pressors  - Lactate 3.4 --> 2.3 --> 2.6    Pulm:   - Extubated 1/28/19  - on 2L NC  - CXR showing b/l pleural effusions, edema, atelectasis    Renal:  Trend BUN/Cr: <2/0.5  Monitor Urine output   Nephro following, appreciate assistance    ID:  AF  1/24 blood cultures: gram positive cocci in clusters resembling staph  1/25 blood cultures: NGTD  1/25 operative cultures pending, ngtd thus far  - on vanc/zosyn      FEN/GI:  TPN @ 75, on hold  Replace lytes PRN, aggressive repletion overnight, q8 labs  ppi  Lactate 2.6, continue to trend, q4h labs      Endo:  Patient with IDDM2, on detemir 20u BID at home  1/27-1/28 with hyperglycemia into the 500s on insulin gtt @ 100u/hr  1/28-1/29 with hypoglycemia requiring d10 infusion @ 125cc/hr    Heme:  Trend H&H : 9.3/27  Post-op hgb 8.5    Plt 42, holding heparin, HIT panel sent  Plt continues to downtrend, last 26  No signs of bleeding at this time    Dispo: Cont ICU care   Primary: SICU/general surgery         Critical care was time spent personally by me on the following activities: development of treatment plan with patient or surrogate and bedside caregivers, discussions with consultants, evaluation of patient's response to treatment, examination of patient, ordering and performing treatments and interventions, ordering and review of laboratory studies, ordering and review of radiographic studies, pulse oximetry, re-evaluation of patient's condition.  This critical care time did not overlap with that of any other provider or involve time for any procedures.     Price Mackay MD  Critical Care - Surgery  [facility]

## 2019-01-29 NOTE — PROGRESS NOTES
Patient persistently hypoglycemic requiring multiple d5 pushes throughout the evening and night. Started on d10 drip, titrating up, currently 125ml/hr. Suspect due to lingering insulin as patient was requiring insulin gtt @ 100 units/hr the previous night for hyperglycemia with blood glucose in the 500s.     Patient tachycardic, however holding off on additional fluid boluses as she clinically appears volume overloaded with borderline respiratory status. Lactates trending down, now <3.    Thrombocytopenia despite discontinuation of heparin. Does not appear to be bleeding. Hgb stable. Monitor closely. Last plt 27. CBC q12.     Price Mackay MD  PGY2, Anesthesiology  SICU

## 2019-01-29 NOTE — SUBJECTIVE & OBJECTIVE
Interval History: Extubated yesterday. HDS o/n. Dec UOP, <800 ml/h24h. Inc serous drainage from LUCIANO, 860 ml/24 hours.     Medications:  Continuous Infusions:   dextrose 10 % in water (D10W) 125 mL/hr at 01/29/19 1023    insulin (HUMAN R) infusion (adults) Stopped (01/28/19 1400)     Scheduled Meds:   albuterol-ipratropium  3 mL Nebulization Q6H    ciprofloxacin  400 mg Intravenous Q12H    fluticasone-vilanterol  1 puff Inhalation Daily    hydrocortisone sodium succinate  50 mg Intravenous Q8H    metronidazole  500 mg Intravenous Q8H    pantoprozole (PROTONIX) IV  40 mg Intravenous Q12H    pyridoxine (vitamin B6)  50 mg Oral Daily    scopolamine  1 patch Transdermal Q3 Days    vancomycin (VANCOCIN) IVPB  1,000 mg Intravenous Q24H     PRN Meds:sodium chloride, albuterol sulfate, dextrose 50 % in water (D50W), dextrose 50 % in water (D50W), dextrose 50%, dextrose 50%, diclofenac sodium, diphenhydrAMINE, glucagon (human recombinant), glucose, glucose, HYDROmorphone, ondansetron, promethazine (PHENERGAN) IVPB, sodium chloride 0.9%, sodium chloride 0.9%     Review of patient's allergies indicates:   Allergen Reactions    Ace inhibitors Swelling    Carvedilol      Other reaction(s): Other (See Comments)  blister    Hydralazine analogues     Metformin Nausea And Vomiting    Tetracycline Itching    Tetracyclines Swelling    Travatan (with benzalkonium) [travoprost (benzalkonium)]     Travoprost Itching     Other reaction(s): Other (See Comments)  Blurry vision     Objective:     Vital Signs (Most Recent):  Temp: 98 °F (36.7 °C) (01/29/19 0700)  Pulse: 100 (01/29/19 1045)  Resp: 15 (01/29/19 1045)  BP: 132/83 (01/29/19 1000)  SpO2: 98 % (01/29/19 1045) Vital Signs (24h Range):  Temp:  [98 °F (36.7 °C)-98.7 °F (37.1 °C)] 98 °F (36.7 °C)  Pulse:  [] 100  Resp:  [6-34] 15  SpO2:  [94 %-100 %] 98 %  BP: ()/() 132/83  Arterial Line BP: ()/() 91/75     Weight: 71.2 kg (157 lb)  Body  mass index is 29.66 kg/m².    Intake/Output - Last 3 Shifts       01/27 0700 - 01/28 0659 01/28 0700 - 01/29 0659 01/29 0700 - 01/30 0659    I.V. (mL/kg) 2571.8 (36.1) 2540.4 (35.7)     Blood       NG/ 60     IV Piggyback 2650 1550     TPN 1413.4      Total Intake(mL/kg) 6830.1 (95.9) 4150.4 (58.3)     Urine (mL/kg/hr) 990 (0.6) 781 (0.5) 660 (2.1)    Drains 223 860 120    Other 347 300     Total Output 1560 1941 780    Net +5270.1 +2209.4 -780                 Physical Exam   Constitutional: No distress.   HENT:   Head: Normocephalic and atraumatic.   Pulmonary/Chest: Effort normal. No respiratory distress.   Abdominal:   Abdomen soft, midline incision with prevena vac, pelvic drain serous  RLQ former incision abscess site clean  Area RLQ above incision with denuded skin (prev blister)   Neurological: She is alert.   Skin: Skin is warm and dry. She is not diaphoretic.       Significant Labs:  CBC:   Recent Labs   Lab 01/29/19  0400   WBC 12.56   RBC 3.05*   HGB 8.0*   HCT 23.4*   PLT 26*   MCV 77*   MCH 26.2*   MCHC 34.2     BMP:   Recent Labs   Lab 01/29/19  0803   GLU 74   *   K 4.2      CO2 27   BUN 6*   CREATININE 0.6   CALCIUM 7.5*   MG 2.0     ABGs:   Recent Labs   Lab 01/28/19  0816   PH 7.367   PCO2 43.8   PO2 98   HCO3 25.2   POCSATURATED 97   BE 0     Lactate 3.9

## 2019-01-29 NOTE — PLAN OF CARE
Problem: Physical Therapy Goal  Goal: Physical Therapy Goal  Goals to be met by: 19    Patient will increase functional independence with mobility by performin. Supine to sit with Moderate Assistance -not met  2. Sit to stand transfer with Maximum Assistance -not met  3. Bed to chair transfer with Maximum Assistance -not met  4. Sitting at edge of bed x10 minutes with Contact Guard Assistance -not met  5. Lower extremity exercise program x10 reps, with assistance as needed -not met    Evaluation completed and goals appropriate. Tita Laws, PT 2019

## 2019-01-30 DIAGNOSIS — I63.429 CEREBROVASCULAR ACCIDENT (CVA) DUE TO EMBOLISM OF ANTERIOR CEREBRAL ARTERY, UNSPECIFIED BLOOD VESSEL LATERALITY: Primary | ICD-10-CM

## 2019-01-30 PROBLEM — R47.1 DYSARTHRIA AND ANARTHRIA: Status: ACTIVE | Noted: 2019-01-30

## 2019-01-30 LAB
ALBUMIN SERPL BCP-MCNC: 1.6 G/DL
ALBUMIN SERPL BCP-MCNC: 1.6 G/DL
ALBUMIN SERPL BCP-MCNC: 1.8 G/DL
ALP SERPL-CCNC: 328 U/L
ALP SERPL-CCNC: 376 U/L
ALP SERPL-CCNC: 380 U/L
ALT SERPL W/O P-5'-P-CCNC: 47 U/L
ALT SERPL W/O P-5'-P-CCNC: 50 U/L
ALT SERPL W/O P-5'-P-CCNC: 50 U/L
ANION GAP SERPL CALC-SCNC: 5 MMOL/L
ANION GAP SERPL CALC-SCNC: 6 MMOL/L
ANION GAP SERPL CALC-SCNC: 7 MMOL/L
ANISOCYTOSIS BLD QL SMEAR: ABNORMAL
ANISOCYTOSIS BLD QL SMEAR: SLIGHT
ANISOCYTOSIS BLD QL SMEAR: SLIGHT
AST SERPL-CCNC: 47 U/L
AST SERPL-CCNC: 58 U/L
AST SERPL-CCNC: 76 U/L
BACTERIA SPEC ANAEROBE CULT: NORMAL
BACTERIA SPEC ANAEROBE CULT: NORMAL
BASOPHILS # BLD AUTO: 0 K/UL
BASOPHILS # BLD AUTO: 0 K/UL
BASOPHILS # BLD AUTO: 0.01 K/UL
BASOPHILS NFR BLD: 0 %
BASOPHILS NFR BLD: 0 %
BASOPHILS NFR BLD: 0.1 %
BILIRUB SERPL-MCNC: 3.1 MG/DL
BILIRUB SERPL-MCNC: 3.7 MG/DL
BILIRUB SERPL-MCNC: 3.9 MG/DL
BUN SERPL-MCNC: 10 MG/DL
BUN SERPL-MCNC: 15 MG/DL
BUN SERPL-MCNC: 7 MG/DL
BURR CELLS BLD QL SMEAR: ABNORMAL
CA-I BLDV-SCNC: 1.08 MMOL/L
CALCIUM SERPL-MCNC: 7.4 MG/DL
CALCIUM SERPL-MCNC: 7.5 MG/DL
CALCIUM SERPL-MCNC: 7.7 MG/DL
CHLORIDE SERPL-SCNC: 100 MMOL/L
CHLORIDE SERPL-SCNC: 100 MMOL/L
CHLORIDE SERPL-SCNC: 102 MMOL/L
CO2 SERPL-SCNC: 26 MMOL/L
CO2 SERPL-SCNC: 27 MMOL/L
CO2 SERPL-SCNC: 27 MMOL/L
CREAT SERPL-MCNC: 0.7 MG/DL
CREAT SERPL-MCNC: 0.7 MG/DL
CREAT SERPL-MCNC: 0.8 MG/DL
DIFFERENTIAL METHOD: ABNORMAL
EOSINOPHIL # BLD AUTO: 0 K/UL
EOSINOPHIL NFR BLD: 0 %
ERYTHROCYTE [DISTWIDTH] IN BLOOD BY AUTOMATED COUNT: 18.8 %
ERYTHROCYTE [DISTWIDTH] IN BLOOD BY AUTOMATED COUNT: 19 %
ERYTHROCYTE [DISTWIDTH] IN BLOOD BY AUTOMATED COUNT: 19.1 %
EST. GFR  (AFRICAN AMERICAN): >60 ML/MIN/1.73 M^2
EST. GFR  (NON AFRICAN AMERICAN): >60 ML/MIN/1.73 M^2
GLUCOSE SERPL-MCNC: 184 MG/DL
GLUCOSE SERPL-MCNC: 227 MG/DL
GLUCOSE SERPL-MCNC: 263 MG/DL
HCT VFR BLD AUTO: 20 %
HCT VFR BLD AUTO: 20.6 %
HCT VFR BLD AUTO: 22.7 %
HEPARIN INDUCED THROMBOCYTOPENIA: NORMAL
HGB BLD-MCNC: 6.8 G/DL
HGB BLD-MCNC: 7.2 G/DL
HGB BLD-MCNC: 7.6 G/DL
HYPOCHROMIA BLD QL SMEAR: ABNORMAL
IMM GRANULOCYTES # BLD AUTO: 0.06 K/UL
IMM GRANULOCYTES # BLD AUTO: 0.07 K/UL
IMM GRANULOCYTES # BLD AUTO: 0.09 K/UL
IMM GRANULOCYTES NFR BLD AUTO: 0.5 %
IMM GRANULOCYTES NFR BLD AUTO: 0.8 %
IMM GRANULOCYTES NFR BLD AUTO: 1.1 %
LYMPHOCYTES # BLD AUTO: 0.5 K/UL
LYMPHOCYTES # BLD AUTO: 0.5 K/UL
LYMPHOCYTES # BLD AUTO: 0.6 K/UL
LYMPHOCYTES NFR BLD: 4.5 %
LYMPHOCYTES NFR BLD: 5.4 %
LYMPHOCYTES NFR BLD: 7.4 %
MAGNESIUM SERPL-MCNC: 2 MG/DL
MAGNESIUM SERPL-MCNC: 2.3 MG/DL
MAGNESIUM SERPL-MCNC: 2.4 MG/DL
MCH RBC QN AUTO: 26.6 PG
MCH RBC QN AUTO: 26.7 PG
MCH RBC QN AUTO: 27.2 PG
MCHC RBC AUTO-ENTMCNC: 33.5 G/DL
MCHC RBC AUTO-ENTMCNC: 34 G/DL
MCHC RBC AUTO-ENTMCNC: 35 G/DL
MCV RBC AUTO: 78 FL
MCV RBC AUTO: 78 FL
MCV RBC AUTO: 79 FL
MONOCYTES # BLD AUTO: 0.9 K/UL
MONOCYTES # BLD AUTO: 1 K/UL
MONOCYTES # BLD AUTO: 1.2 K/UL
MONOCYTES NFR BLD: 10.3 %
MONOCYTES NFR BLD: 10.5 %
MONOCYTES NFR BLD: 11.2 %
NEUTROPHILS # BLD AUTO: 6.9 K/UL
NEUTROPHILS # BLD AUTO: 7.6 K/UL
NEUTROPHILS # BLD AUTO: 9.5 K/UL
NEUTROPHILS NFR BLD: 81 %
NEUTROPHILS NFR BLD: 82.6 %
NEUTROPHILS NFR BLD: 84.6 %
NRBC BLD-RTO: 0 /100 WBC
NRBC BLD-RTO: 1 /100 WBC
NRBC BLD-RTO: 1 /100 WBC
OVALOCYTES BLD QL SMEAR: ABNORMAL
PAPPENHEIMER BOD BLD QL SMEAR: PRESENT
PHOSPHATE SERPL-MCNC: 3.1 MG/DL
PHOSPHATE SERPL-MCNC: 3.5 MG/DL
PHOSPHATE SERPL-MCNC: 3.8 MG/DL
PLATELET # BLD AUTO: 23 K/UL
PLATELET # BLD AUTO: 26 K/UL
PLATELET # BLD AUTO: 31 K/UL
PLATELET BLD QL SMEAR: ABNORMAL
PMV BLD AUTO: ABNORMAL FL
POCT GLUCOSE: 181 MG/DL (ref 70–110)
POCT GLUCOSE: 185 MG/DL (ref 70–110)
POCT GLUCOSE: 200 MG/DL (ref 70–110)
POCT GLUCOSE: 200 MG/DL (ref 70–110)
POCT GLUCOSE: 243 MG/DL (ref 70–110)
POCT GLUCOSE: 251 MG/DL (ref 70–110)
POCT GLUCOSE: 252 MG/DL (ref 70–110)
POCT GLUCOSE: 253 MG/DL (ref 70–110)
POCT GLUCOSE: 255 MG/DL (ref 70–110)
POCT GLUCOSE: 260 MG/DL (ref 70–110)
POCT GLUCOSE: 262 MG/DL (ref 70–110)
POCT GLUCOSE: 330 MG/DL (ref 70–110)
POIKILOCYTOSIS BLD QL SMEAR: ABNORMAL
POIKILOCYTOSIS BLD QL SMEAR: SLIGHT
POIKILOCYTOSIS BLD QL SMEAR: SLIGHT
POLYCHROMASIA BLD QL SMEAR: ABNORMAL
POTASSIUM SERPL-SCNC: 4.5 MMOL/L
POTASSIUM SERPL-SCNC: 4.5 MMOL/L
POTASSIUM SERPL-SCNC: 4.7 MMOL/L
POTASSIUM SERPL-SCNC: 5 MMOL/L
PROT SERPL-MCNC: 3.6 G/DL
PROT SERPL-MCNC: 3.8 G/DL
PROT SERPL-MCNC: 4 G/DL
RBC # BLD AUTO: 2.55 M/UL
RBC # BLD AUTO: 2.65 M/UL
RBC # BLD AUTO: 2.86 M/UL
SCHISTOCYTES BLD QL SMEAR: ABNORMAL
SCHISTOCYTES BLD QL SMEAR: ABNORMAL
SCHISTOCYTES BLD QL SMEAR: PRESENT
SCHISTOCYTES BLD QL SMEAR: PRESENT
SODIUM SERPL-SCNC: 132 MMOL/L
SODIUM SERPL-SCNC: 134 MMOL/L
SODIUM SERPL-SCNC: 134 MMOL/L
TARGETS BLD QL SMEAR: ABNORMAL
TARGETS BLD QL SMEAR: ABNORMAL
TROPONIN I SERPL DL<=0.01 NG/ML-MCNC: 0.03 NG/ML
VANCOMYCIN TROUGH SERPL-MCNC: 25.1 UG/ML
WBC # BLD AUTO: 11.18 K/UL
WBC # BLD AUTO: 8.54 K/UL
WBC # BLD AUTO: 9.14 K/UL

## 2019-01-30 PROCEDURE — 99233 SBSQ HOSP IP/OBS HIGH 50: CPT | Mod: ,,, | Performed by: NURSE PRACTITIONER

## 2019-01-30 PROCEDURE — 25000242 PHARM REV CODE 250 ALT 637 W/ HCPCS: Performed by: SURGERY

## 2019-01-30 PROCEDURE — 84132 ASSAY OF SERUM POTASSIUM: CPT

## 2019-01-30 PROCEDURE — 25000003 PHARM REV CODE 250: Performed by: NURSE PRACTITIONER

## 2019-01-30 PROCEDURE — 82330 ASSAY OF CALCIUM: CPT

## 2019-01-30 PROCEDURE — S0030 INJECTION, METRONIDAZOLE: HCPCS | Performed by: SURGERY

## 2019-01-30 PROCEDURE — 63600175 PHARM REV CODE 636 W HCPCS: Performed by: NURSE PRACTITIONER

## 2019-01-30 PROCEDURE — A4217 STERILE WATER/SALINE, 500 ML: HCPCS | Performed by: STUDENT IN AN ORGANIZED HEALTH CARE EDUCATION/TRAINING PROGRAM

## 2019-01-30 PROCEDURE — 80053 COMPREHEN METABOLIC PANEL: CPT

## 2019-01-30 PROCEDURE — 92610 EVALUATE SWALLOWING FUNCTION: CPT

## 2019-01-30 PROCEDURE — 20000000 HC ICU ROOM

## 2019-01-30 PROCEDURE — 80053 COMPREHEN METABOLIC PANEL: CPT | Mod: 91

## 2019-01-30 PROCEDURE — 25000003 PHARM REV CODE 250: Performed by: STUDENT IN AN ORGANIZED HEALTH CARE EDUCATION/TRAINING PROGRAM

## 2019-01-30 PROCEDURE — 85025 COMPLETE CBC W/AUTO DIFF WBC: CPT | Mod: 91

## 2019-01-30 PROCEDURE — C9113 INJ PANTOPRAZOLE SODIUM, VIA: HCPCS | Performed by: PHYSICIAN ASSISTANT

## 2019-01-30 PROCEDURE — 93005 ELECTROCARDIOGRAM TRACING: CPT

## 2019-01-30 PROCEDURE — 63600175 PHARM REV CODE 636 W HCPCS: Performed by: SURGERY

## 2019-01-30 PROCEDURE — 63600175 PHARM REV CODE 636 W HCPCS: Performed by: STUDENT IN AN ORGANIZED HEALTH CARE EDUCATION/TRAINING PROGRAM

## 2019-01-30 PROCEDURE — 93010 ELECTROCARDIOGRAM REPORT: CPT | Mod: ,,, | Performed by: INTERNAL MEDICINE

## 2019-01-30 PROCEDURE — 83735 ASSAY OF MAGNESIUM: CPT

## 2019-01-30 PROCEDURE — 63600175 PHARM REV CODE 636 W HCPCS: Performed by: PHYSICIAN ASSISTANT

## 2019-01-30 PROCEDURE — 94640 AIRWAY INHALATION TREATMENT: CPT

## 2019-01-30 PROCEDURE — 99223 1ST HOSP IP/OBS HIGH 75: CPT | Mod: ,,, | Performed by: PSYCHIATRY & NEUROLOGY

## 2019-01-30 PROCEDURE — 84100 ASSAY OF PHOSPHORUS: CPT | Mod: 91

## 2019-01-30 PROCEDURE — 93010 EKG 12-LEAD: ICD-10-PCS | Mod: ,,, | Performed by: INTERNAL MEDICINE

## 2019-01-30 PROCEDURE — 84100 ASSAY OF PHOSPHORUS: CPT

## 2019-01-30 PROCEDURE — 82330 ASSAY OF CALCIUM: CPT | Mod: 91

## 2019-01-30 PROCEDURE — 99223 PR INITIAL HOSPITAL CARE,LEVL III: ICD-10-PCS | Mod: ,,, | Performed by: PSYCHIATRY & NEUROLOGY

## 2019-01-30 PROCEDURE — 84484 ASSAY OF TROPONIN QUANT: CPT

## 2019-01-30 PROCEDURE — 25000003 PHARM REV CODE 250: Performed by: SURGERY

## 2019-01-30 PROCEDURE — B4185 PARENTERAL SOL 10 GM LIPIDS: HCPCS | Performed by: STUDENT IN AN ORGANIZED HEALTH CARE EDUCATION/TRAINING PROGRAM

## 2019-01-30 PROCEDURE — 94664 DEMO&/EVAL PT USE INHALER: CPT

## 2019-01-30 PROCEDURE — 94761 N-INVAS EAR/PLS OXIMETRY MLT: CPT

## 2019-01-30 PROCEDURE — 36415 COLL VENOUS BLD VENIPUNCTURE: CPT

## 2019-01-30 PROCEDURE — 83735 ASSAY OF MAGNESIUM: CPT | Mod: 91

## 2019-01-30 PROCEDURE — 80202 ASSAY OF VANCOMYCIN: CPT

## 2019-01-30 PROCEDURE — 99900035 HC TECH TIME PER 15 MIN (STAT)

## 2019-01-30 PROCEDURE — 99233 PR SUBSEQUENT HOSPITAL CARE,LEVL III: ICD-10-PCS | Mod: ,,, | Performed by: NURSE PRACTITIONER

## 2019-01-30 PROCEDURE — 94668 MNPJ CHEST WALL SBSQ: CPT

## 2019-01-30 RX ORDER — INSULIN ASPART 100 [IU]/ML
0-5 INJECTION, SOLUTION INTRAVENOUS; SUBCUTANEOUS EVERY 4 HOURS PRN
Status: CANCELLED | OUTPATIENT
Start: 2019-01-30

## 2019-01-30 RX ORDER — FUROSEMIDE 10 MG/ML
40 INJECTION INTRAMUSCULAR; INTRAVENOUS ONCE
Status: COMPLETED | OUTPATIENT
Start: 2019-01-30 | End: 2019-01-30

## 2019-01-30 RX ORDER — GLUCAGON 1 MG
1 KIT INJECTION
Status: CANCELLED | OUTPATIENT
Start: 2019-01-30

## 2019-01-30 RX ADMIN — HYDROMORPHONE HYDROCHLORIDE 0.5 MG: 1 INJECTION, SOLUTION INTRAMUSCULAR; INTRAVENOUS; SUBCUTANEOUS at 12:01

## 2019-01-30 RX ADMIN — HYDROMORPHONE HYDROCHLORIDE 0.5 MG: 1 INJECTION, SOLUTION INTRAMUSCULAR; INTRAVENOUS; SUBCUTANEOUS at 08:01

## 2019-01-30 RX ADMIN — METRONIDAZOLE 500 MG: 500 INJECTION, SOLUTION INTRAVENOUS at 01:01

## 2019-01-30 RX ADMIN — HYDROCORTISONE SODIUM SUCCINATE 50 MG: 100 INJECTION, POWDER, FOR SOLUTION INTRAMUSCULAR; INTRAVENOUS at 08:01

## 2019-01-30 RX ADMIN — SOYBEAN OIL 250 ML: 20 INJECTION, SOLUTION INTRAVENOUS at 09:01

## 2019-01-30 RX ADMIN — IPRATROPIUM BROMIDE AND ALBUTEROL SULFATE 3 ML: .5; 3 SOLUTION RESPIRATORY (INHALATION) at 08:01

## 2019-01-30 RX ADMIN — CIPROFLOXACIN 400 MG: 2 INJECTION, SOLUTION INTRAVENOUS at 10:01

## 2019-01-30 RX ADMIN — CIPROFLOXACIN 400 MG: 2 INJECTION, SOLUTION INTRAVENOUS at 09:01

## 2019-01-30 RX ADMIN — PANTOPRAZOLE SODIUM 40 MG: 40 INJECTION, POWDER, LYOPHILIZED, FOR SOLUTION INTRAVENOUS at 09:01

## 2019-01-30 RX ADMIN — ALBUTEROL SULFATE 2.5 MG: 2.5 SOLUTION RESPIRATORY (INHALATION) at 02:01

## 2019-01-30 RX ADMIN — METRONIDAZOLE 500 MG: 500 INJECTION, SOLUTION INTRAVENOUS at 06:01

## 2019-01-30 RX ADMIN — IPRATROPIUM BROMIDE AND ALBUTEROL SULFATE 3 ML: .5; 3 SOLUTION RESPIRATORY (INHALATION) at 01:01

## 2019-01-30 RX ADMIN — FUROSEMIDE 40 MG: 10 INJECTION, SOLUTION INTRAVENOUS at 09:01

## 2019-01-30 RX ADMIN — FUROSEMIDE 40 MG: 10 INJECTION, SOLUTION INTRAVENOUS at 06:01

## 2019-01-30 RX ADMIN — SODIUM CHLORIDE 0.5 UNITS/HR: 9 INJECTION, SOLUTION INTRAVENOUS at 05:01

## 2019-01-30 RX ADMIN — IPRATROPIUM BROMIDE AND ALBUTEROL SULFATE 3 ML: .5; 3 SOLUTION RESPIRATORY (INHALATION) at 09:01

## 2019-01-30 RX ADMIN — FLUTICASONE FUROATE AND VILANTEROL TRIFENATATE 1 PUFF: 100; 25 POWDER RESPIRATORY (INHALATION) at 12:01

## 2019-01-30 RX ADMIN — HYDROMORPHONE HYDROCHLORIDE 0.5 MG: 1 INJECTION, SOLUTION INTRAMUSCULAR; INTRAVENOUS; SUBCUTANEOUS at 05:01

## 2019-01-30 RX ADMIN — HYDROMORPHONE HYDROCHLORIDE 0.5 MG: 1 INJECTION, SOLUTION INTRAMUSCULAR; INTRAVENOUS; SUBCUTANEOUS at 09:01

## 2019-01-30 RX ADMIN — CALCIUM GLUCONATE: 94 INJECTION, SOLUTION INTRAVENOUS at 09:01

## 2019-01-30 RX ADMIN — HYDROCORTISONE SODIUM SUCCINATE 50 MG: 100 INJECTION, POWDER, FOR SOLUTION INTRAMUSCULAR; INTRAVENOUS at 05:01

## 2019-01-30 RX ADMIN — PANTOPRAZOLE SODIUM 40 MG: 40 INJECTION, POWDER, LYOPHILIZED, FOR SOLUTION INTRAVENOUS at 08:01

## 2019-01-30 RX ADMIN — HYDROMORPHONE HYDROCHLORIDE 0.5 MG: 1 INJECTION, SOLUTION INTRAMUSCULAR; INTRAVENOUS; SUBCUTANEOUS at 01:01

## 2019-01-30 RX ADMIN — ALBUTEROL SULFATE 2.5 MG: 2.5 SOLUTION RESPIRATORY (INHALATION) at 11:01

## 2019-01-30 RX ADMIN — METRONIDAZOLE 500 MG: 500 INJECTION, SOLUTION INTRAVENOUS at 09:01

## 2019-01-30 NOTE — PHYSICIAN QUERY
PT Name: Sheryl Martines  MR #: [id number]    Physician Query Form - Respiratory Condition Clarification      CDS/: JUDY Ge,RNC-MNN         Contact information:[email or web address]     This form is a permanent document in the medical record.    Query Date: January 30, 2019    By submitting this query, we are merely seeking further clarification of documentation. Please utilize your independent clinical judgment when addressing the question(s) below.    The Medical record contains the following   Indicators   Supporting Clinical Findings Location in Medical Record   X   SOB, RUIZ, Wheezing, Productive Cough, Use of Accessory Muscles, etc. Diffuse wheezing ED Progress note 1/23@1034pm   X   Acute/Chronic Illness Pt is a 63 y.o. female with hx of asthma, COPD, CAD, DM, HTN, CVA 2012 with R side deficits, HFpEF, severe debility , lap converted to open appendectomy 8/2018 complicated by cdiff, failure to thrive and recurrent fluid collection presented to the ED with intractable n/v and abdominal pain. Previous Cxs- MRSA, Enterococcus, Ecoli and Anaerobes.   On admit, Lactic acid 6.8, with tachycardia and WBC of 13.  1/24 CT shows fluid collection in R hemipelvis.  Started on vanc and zosyn.   Patient now s/p exploratory laparotomy, washout, and pelvic abscess drainage on 1/25/19.  ID Progress note 1/29@518pm      Radiology Findings     X   Respiratory Distress or Failure later intubated for respiratory failure   Nephrology consult note 1/26@334pm      Hypoxia or Hypercapnia     X   RR         ABGs         O2 sat ABG  7.11/49/116/16, BE -14    PCO2=19.4-68.8  PO2= H&P 1/26    LAB 1/23-1/30   X BiPAP/Intubation   Pt. Emergently intubated with size 7.0ETT and secured 22cm at lip.Currently on AC ventilatory support     Respiratory Therapy Progress note 1/26@251am      Supplemental O2        Home O2, Oxygen Dependence     X   Treatment - 50 meq bicarb x1  - serial ABGs  - daily CXR H&P 1/26       Other       Respiratory failure can be acute, chronic or both. It is generally further specified as hypoxic, hypercapnic or both. Lastly, it is important to identify an etiology, if known or suspected.   References:: https://www.acphospitalist.org/archives/2013/10/coding; htm; http://[employer]/acute-respiratory-failure-know    The clinical guidelines noted below are only system guidelines, and do not replace the providers clinical judgment.    Provider, please specify diagnosis or diagnoses associated with above clinical findings.   [   ] Acute Respiratory Failure with Hypoxia - ABG pO2 < 60 mmHg or O2 sat of 88% on RA and respiratory symptoms documented   [   ] Acute Respiratory Failure with Hypercapnia - pCO2 > 50 mmHg with pH < 7.35 and respiratory symptoms documented   [   ] Acute Respiratory Failure with Hypoxia and Hypercapnia - Hypoxia: ABG pO2 < 60 mmHg or O2 sat of 88% on RA and Hypercapnia: pCO2 > 50 mmHg with pH < 7.35 and respiratory symptoms documented   [   ] Other Acute Respiratory Failure     [   ] No Respiratory Failure, Maintained on Vent for Routine Care or Airway Protection -  purposely intubated for airway protection (e.g.: angioedema, stroke, trauma); without meeting the criteria for respiratory failure.    [   ] Other Respiratory Diagnosis (please specify): _________________________________   [ x  ]  Clinically Undetermined       Please document in your progress notes daily for the duration of treatment until resolved and include in your discharge summary.

## 2019-01-30 NOTE — SIGNIFICANT EVENT
Time Stroke Code initiated:10:53  Stroke team Arrival time: 10:55  Stroke Code activation triggers:  garbled speech and slight left droop   Vascular Neurologist you spoke with: Olivia NP      Arrived at CT: 11:22  CT completed: 11: 38    VAN positive or negative: Negative     tPA decision: NA   tPA bolus (mg and time): NA   tPA infusion (mg and time): NA   tPA end time: NA     IR decision: NA   IR arrival: NA   IR end time: NA     Pt transferred to: in SICU [address]  Report given to: OG

## 2019-01-30 NOTE — PHYSICIAN QUERY
PT Name: Sheryl Martines  MR #: [id number]     PHYSICIAN QUERY -  ELECTROLYTE CLARIFICATION      CDS/: JUDY Ge,RNC-MNN        Contact information:[email or web address]  This form is a permanent document in the medical record.     Query Date: January 30, 2019    By submitting this query, we are merely seeking further clarification of documentation to reflect the severity of illness of your patient. Please utilize your independent clinical judgment when addressing the question(s) below.    The Medical record reflects the following:     Indicators   Supporting Clinical Findings Location in Medical Record   X Lab Value(s) Calcium=6.6-8.2    Sodium=129-140 LAB 1/23-1/30   X Treatment                                 Medication calcium chloride 1 g in dextrose 5 % 100 mL IVPB    calcium gluconate 1g in dextrose 5% 100mL (ready to mix system)    sodium chloride 0.9% bolus 2,000 mL    sodium chloride 0.9% bolus 1,000 mL MAR 1/26-1/27              MAR 1/23-1/26    Other       Provider, please specify the diagnosis or diagnoses that correspond(s) to the above indicators. David all that apply:    [ x  ] Hypocalcemia   [   ] Hyponatremia   [   ] Other electrolyte disturbance (please specify): _______   [   ]  Clinically Undetermined       Please document in your progress notes daily for the duration of treatment until resolved, and include in your discharge summary.

## 2019-01-30 NOTE — PT/OT/SLP EVAL
Speech Language Pathology Evaluation  Bedside Swallow    Patient Name:  Sheryl Martines   MRN:  [id number]  Admitting Diagnosis: Pelvic abscess in female.  Pt s/p laparotomy, exploratory, incision and drainage, abscess (5 days Post-op.)  PMH DM, HTN, CAN, CVA (2012) also pertinent to this visit.     Recommendations:                 General Recommendations:  Dysphagia therapy, consult with dentist   Diet recommendations:  NPO, NPO   Aspiration Precautions: Continue alternate means of nutrition/hydration  General Precautions: Standard, aspiration, fall, contact  Communication strategies:  provide increased time to answer and go to room if call light pushed    History:     Past Medical History:   Diagnosis Date    Abnormal LFTs 12/14/2018    Asthma     Closed compression fracture of fourth lumbar vertebra     COPD (chronic obstructive pulmonary disease)     Coronary artery disease     Diabetes mellitus     Fall 10/4/2018    Fracture     right tib & fib    Glaucoma     High cholesterol     Hypertension     Infected incision 9/20/2018    Intractable nausea and vomiting 1/23/2019    Iritis     Pulmonary embolus     S/P appendectomy 9/11/2018    Stroke     rt sided weakness.       Past Surgical History:   Procedure Laterality Date    ABDOMINAL SURGERY      APPENDECTOMY N/A 8/26/2018    Performed by [facility]rez [facility] at [facility] OR    APPENDECTOMY, LAPAROSCOPIC---CONVERTED TO OPEN APPENDECTOMY @0950 N/A 8/26/2018    Performed by [facility] at [facility] OR    APPLICATION, WOUND VAC N/A 1/25/2019    Per[facility] at [facility] OR [address] [place] [address]    BACK SURGERY      stimulator    CATARACT EXTRACTION      EXCISION, ABSCESS- drainage abdominal wall abscess N/A 1/25/2019    Performed by [facility] Laurent [facility] at [facility] OR [address] [place] [address]    HYSTERECTOMY      INCISION AND DRAINAGE, ABSCESS-  drainage of pelvic abscess N/A 1/25/2019    Performed by Monster Laurent [facility] at [facility] OR [address] [place] [address]     "LAPAROTOMY, EXPLORATORY N/A 1/25/2019    Performed by [facility] at [facility] OR 2ND FLR    TOTAL HIP ARTHROPLASTY Left     2008     Social/occupational History:  Patient zack [place]; however , now lines with her daughter.  She has assistance with ADLs from her daughters.     Prior Intubation HX:  1/25/19, 1/26/2019- 1/29/2019    Chest X-Rays: 1/30/2019; Central line lower SVC and there is a bio stimulator.  There is cardiomegaly moderate-severe edema pleural fluid and no change.    CT head 1/30/2019: No evidence of acute hemorrhage or major vascular distribution infarct.    Prior diet: Regular, thin.  Patient's daughter explains Pt with minimal PO intake ~ 1 month 2/2 nausea vomiting. Patient's daughter further explained Pt mainly only taken in liquids since late October 2018 2/2 pain and nausea.     Subjective     Pt reviewed with nurse, nurse reported Stroke code called earlier in day 2/2 slurred speech  Pt presents alert, anxious  She explains, "I can clear it [troat] but not all the way"   Daughter explains, "hwer voice is stronger today but she is more slurred in her speech"     Pain/Comfort:  · Pain Rating 1: 6/10  · Location - Orientation 1: upper  · Location 1: palate  · Pain Addressed 1: Cessation of Activity, Nurse notified, Other (see comments)(MD team paged and findings of palatal swelling reviewed )  · Pain Rating Post-Intervention 1: 6/10    Objective:   Pt found awake and alert in bed with central line, suction drain, trialysis catheter, and wound vac. Daughter at bedside and elevated HOB. Call light within reach.     Oral Musculature Evaluation  · Oral Musculature: general weakness  · Dentition: present and adequate  · Secretion Management: problems swallowing secretions  · Mucosal Quality: dry  · Mandibular Strength and Mobility: impaired(reduced strength )  · Oral Labial Strength and Mobility: functional pursing, functional retraction  · Lingual Strength and Mobility: impaired " strength, impaired left lateral movement, impaired protrusion  · Volitional Cough: weak   · Volitional Swallow: elicited, delayed, decreased excursion    · Voice Prior to PO Intake: wet, mildly hoarse, decreased intensity, dysarthric   · Oral Musculature Comments: Patient with blood-tinged, palatal swelling with c/o pain /tightness t/o oral cavity. Nurse aware and MD team notified.     Bedside Swallow Eval:   Consistencies Assessed:  · Unable to assess 2/2 wet vocal quality, decreased lingual coordination and palatal swelling. Pt not appropriate for PO trials.     Oral Phase:   · SALIMA    Pharyngeal Phase:   · SALIMA    Compensatory Strategies  · n/a    Treatment: Pt presents with decreased control of secretions, palatal swelling and decreased lingual strength and coordination. PO trials held 2/2 aspiration risk. Nurse and MD team notified of findings and concerns for palatal swelling and decreased lingual strength/coordination. Pt and Daughters educated on aspiration precautions and ongoing f/u with ST as status improves.  Pt with c/o nausea. Emesis bags left at bedside.  White board current. No questions noted.     Assessment:     Sheryl Martines is a 63 y.o. female with an SLP diagnosis of Dysphagia and Dysarthria.  She presents with palatal swelling and decreased lingual strength and coordination.  Pt not appropriate for PO intake. Findings reviewed with nurse and MD team.     Goals:   Multidisciplinary Problems     SLP Goals        Problem: SLP Goal    Goal Priority Disciplines Outcome   SLP Goal     SLP Ongoing (interventions implemented as appropriate)   Description:  Speech Language Pathology Goals  Goals expected to be met by 2/6/19  1. Pt will participate in ongoing swallow assessment  2. Educate Pt and family on S/S aspiration and aspiration precautions                        Plan:     · Patient to be seen:  4 x/week   · Plan of Care expires:  03/01/19  · Plan of Care reviewed with:  patient, daughter   · SLP  Follow-Up:  Yes        Discharge recommendations:    TBD   Barriers to Discharge:  Level of Skilled Assistance Needed     Time Tracking:     SLP Treatment Date:   01/30/19  Speech Start Time:  1253  Speech Stop Time:  1325     Speech Total Time (min):  32 min    Billable Minutes: Eval Swallow and Oral Function 32    RAJWINDER Cobb, [facility]-SLP  Speech-Language Pathology  Pager: [phone]    01/30/2019

## 2019-01-30 NOTE — PHYSICIAN QUERY
PT Name: Sheryl Martines  MR #: [id number]    Physician Query Form - Relationship to Procedure Clarification     CDS/: JUDY Ge,RNC-MNN          Contact information:[email or web address]     This form is a permanent document in the medical record.     Query Date: January 30, 2019      By submitting this query, we are merely seeking further clarification of documentation. Please utilize your independent clinical judgment when addressing the question(s) below.    The Medical record contains the following:  Supporting Clinical Findings Location in Medical Record   Pelvic abscess in female    lap converted to open appendectomy 8/2018 complicated by cdiff, failure to thrive.  Hx of difficult intubation and delayed emergence, CO2 narcosis needing ICU stay.  Hx of PRN O2 at night.  Denies smoking. Patient was admitted on 1/23/2019 via ED for abd pain, N/V, decreased appetite.  Lactic acid 6.8, with tachycardia and W of 13 (severe sepsis).  CT shows fluid collection in R hemipelvis.  Started on vanc and zosyn. Patient now s/p exploratory laparotomy, washout, and pelvic abscess drainage on 1/25/19.     H&P 1/26       Please clarify if pelvic abscess is      [x  ] Inherent/Integral to procedure   [  ] Present, but not a complication of the procedure   [  ] Incidental finding, not clinically significant   [  ] Complication of procedure   [  ] Other (please specify): __________________   [  ] Clinically Undetermined

## 2019-01-30 NOTE — PROGRESS NOTES
[facility]  Critical Care - Surgery  Progress Note    Patient Name: Sheryl Martines  MRN: [id number]  Admission Date: 1/23/2019  Hospital Length of Stay: 6 days  Code Status: Full Code  Attending Provider: Monster Laurent MD  Primary Care Provider: Primary Doctor No   Principal Problem: Pelvic abscess in female    Subjective:     Hospital/ICU Course:  No notes on file    Interval History/Significant Events: NAEON. Started on TPN yesterday; with initiation was able to come off of D10 and insulin gtt. Glucose levels staying stable. Has been in and out of sinus rhythm and sinus tachy on tele overnight. Receiving prn pain medications; tolerating well. This am noting right arm and shoulder pain; with complete EKG/troponin lab work for further workup.    Follow-up For: Procedure(s) (LRB):  LAPAROTOMY, EXPLORATORY (N/A)  INCISION AND DRAINAGE, ABSCESS-  drainage of pelvic abscess (N/A)  EXCISION, ABSCESS- drainage abdominal wall abscess (N/A)  APPLICATION, WOUND VAC (N/A)    Post-Operative Day: 5 Days Post-Op    Objective:     Vital Signs (Most Recent):  Temp: 98.1 °F (36.7 °C) (01/30/19 0745)  Pulse: 106 (01/30/19 0900)  Resp: (!) 21 (01/30/19 0900)  BP: (!) 159/91 (01/30/19 0900)  SpO2: 95 % (01/30/19 0900) Vital Signs (24h Range):  Temp:  [97.4 °F (36.3 °C)-98.9 °F (37.2 °C)] 98.1 °F (36.7 °C)  Pulse:  [] 106  Resp:  [10-24] 21  SpO2:  [93 %-100 %] 95 %  BP: (109-170)/() 159/91     Weight: 71.2 kg (157 lb)  Body mass index is 29.66 kg/m².      Intake/Output Summary (Last 24 hours) at 1/30/2019 0954  Last data filed at 1/30/2019 0917  Gross per 24 hour   Intake 5297.6 ml   Output 3430 ml   Net 1867.6 ml       Physical Exam   Constitutional: She appears well-developed and well-nourished. No distress.   HENT:   Head: Normocephalic.   Eyes: Pupils are equal, round, and reactive to light.   Cardiovascular: Regular rhythm.   Tachycardic   Pulmonary/Chest: No respiratory distress. She has no  wheezes.   Extubated on RA.   Abdominal: Soft. She exhibits no distension.   Neurological:   lethargic   Skin: Skin is warm and dry.       Vents: Not on ventilator.      Lines/Drains/Airways     Central Venous Catheter Line                 Port A Cath Single Lumen 01/23/19 1400 right subclavian 6 days         Percutaneous Central Line Insertion/Assessment - triple lumen  01/26/19 0010 left internal jugular 4 days         Trialysis (Dialysis) Catheter 01/26/19 1200 right internal jugular 3 days          Drain                 Urethral Catheter 01/25/19 0934 Straight-tip;Non-latex 16 Fr. 5 days         Closed/Suction Drain 01/25/19 1020 Right;Ventral Abdomen Bulb 19 Fr. 4 days          Pressure Ulcer                 Negative Pressure Wound Therapy  4 days                Significant Labs:    CBC/Anemia Profile:  Recent Labs   Lab 01/29/19  0400 01/29/19  1959 01/30/19  0650   WBC 12.56 11.59 11.18   HGB 8.0* 7.2* 7.6*   HCT 23.4* 20.6* 22.7*   PLT 26* 20* 23*   MCV 77* 78* 79*   RDW 18.5* 18.9* 19.1*        Chemistries:  Recent Labs   Lab 01/29/19  0803 01/29/19  1645 01/30/19  0001   * 135* 134*   K 4.2 3.8 4.5    103 102   CO2 27 26 27   BUN 6* 6* 7*   CREATININE 0.6 0.6 0.7   CALCIUM 7.5* 7.2* 7.4*   ALBUMIN 1.7* 1.6* 1.6*   PROT 3.6* 3.4* 3.6*   BILITOT 2.7* 2.9* 3.1*   ALKPHOS 338* 318* 328*   ALT 48* 46* 50*   * 88* 76*   MG 2.0 1.9 2.0   PHOS 2.8 2.8 3.1       All pertinent labs within the past 24 hours have been reviewed.    Significant Imaging:  I have reviewed all pertinent imaging results/findings within the past 24 hours.    Assessment/Plan:     * Pelvic abscess in female    63 y.o. female with hx of asthma, COPD, CAD, DM, HTN, CVA 2012 with R side deficits, HFpEF, severe debility, lap converted to open appendectomy 8/2018 complicated by cdiff, failure to thrive.  Hx of difficult intubation and delayed emergence, CO2 narcosis needing ICU stay.  Hx of PRN O2 at night. Patient now s/p  exploratory laparotomy, washout, and pelvic abscess drainage on 1/25/19.    Post-operatively, on initial evaluation in PACU, patient hypotensive to 60s/40s, on BiPap. Currently fluid resuscitating with 3rd liter of LR, responding well to phenylephrine, starting phenylephrine infusion (tachycardic to ~118). ABG 7.11/49/116/16, BE -14, on bipap 14/5, 35% FiO2. Intubated on 1/26 for airway protection. Required CRRT on 1/26 for metabolic acidosis, now off.     Neuro:  Sedation: None  Pain: dilaudid 0.5mg PRN     CV:  - HDS  - Off pressors  - Lactate 3.4 --> 2.3 --> 2.6->2.2->1.8->2.1    Pulm:   - Extubated 1/28/19  - on RA  - CXR showing b/l pleural effusions, edema, atelectasis; continue to monitor with prn CXR as needed    Renal:  Trend BUN/Cr: 7/0.7  Monitor Urine output   Nephro following, appreciate assistance    ID:  AF  1/24 blood cultures: gram positive cocci in clusters resembling staph  1/25 blood cultures: NGTD  1/25 operative cultures pending, ngtd thus far  - on cipro/metro/vanc (for stop dates see ID note)      FEN/GI:  TPN @ 75  Replace lytes PRN  Last lactate 2.1; no longer trending      Endo:  Patient with IDDM2, on detemir 20u BID at home  1/27-1/28 with hyperglycemia into the 500s on insulin gtt @ 100u/hr  1/28-1/29 with hypoglycemia requiring d10 infusion @ 125cc/hr  With introduction of TPN has now come off of insulin gtt and D10 with glucose levels stable    Heme:  Trend H&H : 7.6/22.7  Post-op hgb 8.5   Plt 20, holding heparin, HIT panel sent  Plt continues to downtrend  No signs of bleeding at this time    Dispo: Cont ICU care   Primary: SICU/general surgery            Critical care was time spent personally by me on the following activities: development of treatment plan with patient or surrogate and bedside caregivers, discussions with consultants, evaluation of patient's response to treatment, examination of patient, ordering and performing treatments and interventions, ordering and review of  laboratory studies, ordering and review of radiographic studies, pulse oximetry, re-evaluation of patient's condition.  This critical care time did not overlap with that of any other provider or involve time for any procedures.     Chyna Ott MD  Critical Care - Surgery  [facility]

## 2019-01-30 NOTE — ASSESSMENT & PLAN NOTE
Patient is a 63 year old female with medical history of asthma, COPD, prior stroke (2012 bilateral thalamus), DM 2 who is admitted for pelvic abscess. Stroke code called due to worsening facial weakness and dysarthria.  Patient and family did not notice any significant change in her face and speech.  CT head negative.  Unlikely due to acute ischemic event.  Symptoms could be due to recrudesce of prior stroke (right sided deficits at baseline per family) in the setting of infection.

## 2019-01-30 NOTE — ASSESSMENT & PLAN NOTE
63 y.o. female with hx of asthma, COPD, CAD, DM, HTN, CVA 2012 with R side deficits, HFpEF, severe debility, lap converted to open appendectomy 8/2018 complicated by cdiff, failure to thrive.  Hx of difficult intubation and delayed emergence, CO2 narcosis needing ICU stay.  Hx of PRN O2 at night. Patient now s/p exploratory laparotomy, washout, and pelvic abscess drainage on 1/25/19.    Post-operatively, on initial evaluation in PACU, patient hypotensive to 60s/40s, on BiPap. Currently fluid resuscitating with 3rd liter of LR, responding well to phenylephrine, starting phenylephrine infusion (tachycardic to ~118). ABG 7.11/49/116/16, BE -14, on bipap 14/5, 35% FiO2. Intubated on 1/26 for airway protection. Required CRRT on 1/26 for metabolic acidosis, now off.     Neuro:  Sedation: None  Pain: dilaudid 0.5mg PRN     CV:  - HDS  - Off pressors  - Lactate 3.4 --> 2.3 --> 2.6->2.2->1.8->2.1    Pulm:   - Extubated 1/28/19  - on RA  - CXR showing b/l pleural effusions, edema, atelectasis; continue to monitor with prn CXR as needed    Renal:  Trend BUN/Cr: 7/0.7  Monitor Urine output   Nephro following, appreciate assistance    ID:  AF  1/24 blood cultures: gram positive cocci in clusters resembling staph  1/25 blood cultures: NGTD  1/25 operative cultures pending, ngtd thus far  - on cipro/metro/vanc      FEN/GI:  TPN @ 75  Replace lytes PRN  Last lactate 2.1; no longer trending      Endo:  Patient with IDDM2, on detemir 20u BID at home  1/27-1/28 with hyperglycemia into the 500s on insulin gtt @ 100u/hr  1/28-1/29 with hypoglycemia requiring d10 infusion @ 125cc/hr  With introduction of TPN has now come off of insulin gtt and D10 with glucose levels stable    Heme:  Trend H&H : 7.6/22.7  Post-op hgb 8.5   Plt 20, holding heparin, HIT panel sent  Plt continues to downtrend  No signs of bleeding at this time    Dispo: Cont ICU care   Primary: SICU/general surgery

## 2019-01-30 NOTE — ASSESSMENT & PLAN NOTE
BG goal 140 - 180     Continue IV insulin infusion protocol. Start Insulin infusion at 0.3 units/hr.   Requires intensive BG monitoring while on protocol. Every 2 hours.      ** Please notify Endocrine for any change and/or advance in diet**  ** Please call Endocrine for any BG related issues **      Discharge planning: TBD

## 2019-01-30 NOTE — PT/OT/SLP PROGRESS
Physical Therapy      Patient Name:  Sheryl Martines   MRN:  [id number]    Patient not seen today secondary to Bowel/bladder accident, Unavailable (Comment). PT attempted to see pt x3 attempts- 1st attempt pt w/ SLP, 2nd attempt nurse/PCT assisting w/ hygiene following bladder accident, on 3rd attempt pt's nurse repored that pt was wheezing and respiratory had been contacted for a treatment. PT unable to return. Will follow-up per PT POC.    Maddy Davies, PT   1/30/2019

## 2019-01-30 NOTE — SUBJECTIVE & OBJECTIVE
"Interval HPI:   Overnight events:    Remains in SICU. Now on TPN. D10 infusion has been discontinued. Expect BG elevations. Will continue IIP.     Eating:   NPO  Nausea: No  Hypoglycemia and intervention: No  Fever: No  TPN and/or TF: Yes (TPN)  If yes, type of TF/TPN and rate: 65 cc/hr    BP (!) 159/91 (BP Location: Right arm, Patient Position: Lying)   Pulse 106   Temp 98.1 °F (36.7 °C) (Oral)   Resp (!) 21   Ht 5' 1" (1.549 m)   Wt 71.2 kg (157 lb)   LMP  (LMP Unknown)   SpO2 95%   Breastfeeding? No   BMI 29.66 kg/m²     Labs Reviewed and Include    Recent Labs   Lab 01/30/19  0001   *   CALCIUM 7.4*   ALBUMIN 1.6*   PROT 3.6*   *   K 4.5   CO2 27      BUN 7*   CREATININE 0.7   ALKPHOS 328*   ALT 50*   AST 76*   BILITOT 3.1*     Lab Results   Component Value Date    WBC 11.18 01/30/2019    HGB 7.6 (L) 01/30/2019    HCT 22.7 (L) 01/30/2019    MCV 79 (L) 01/30/2019    PLT 23 (LL) 01/30/2019     No results for input(s): TSH, FREET4 in the last 168 hours.  Lab Results   Component Value Date    HGBA1C 4.7 01/24/2019       Nutritional status:   Body mass index is 29.66 kg/m².  Lab Results   Component Value Date    ALBUMIN 1.6 (L) 01/30/2019    ALBUMIN 1.6 (L) 01/29/2019    ALBUMIN 1.7 (L) 01/29/2019     Lab Results   Component Value Date    PREALBUMIN 3 (L) 01/28/2019    PREALBUMIN 5 (L) 01/24/2019    PREALBUMIN 20 10/05/2018       Estimated Creatinine Clearance: 74.3 mL/min (based on SCr of 0.7 mg/dL).    Accu-Checks  Recent Labs     01/29/19  1644 01/29/19  1807 01/29/19  1959 01/29/19  2057 01/29/19  2203 01/29/19  2258 01/29/19  2359 01/30/19  0059 01/30/19  0304 01/30/19  0805   POCTGLUCOSE 149* 134* 164* 136* 156* 184* 200* 181* 185* 200*       Current Medications and/or Treatments Impacting Glycemic Control  Immunotherapy:    Immunosuppressants     None        Steroids:   Hormones (From admission, onward)    Start     Stop Route Frequency Ordered    01/30/19 2100  hydrocortisone " sodium succinate injection 50 mg      -- IV 2 times daily 01/30/19 0990        Pressors:    Autonomic Drugs (From admission, onward)    None        Hyperglycemia/Diabetes Medications:   Antihyperglycemics (From admission, onward)    Start     Stop Route Frequency Ordered    01/27/19 1400  insulin regular (Humulin R) 100 Units in sodium chloride 0.9% 100 mL infusion     Question:  Insulin Rate Adjustment (DO NOT MODIFY ANSWER)  Answer:  [email or web address]    -- IV Continuous 01/27/19 1254

## 2019-01-30 NOTE — CONSULTS
[facility]  Vascular Neurology  Comprehensive Stroke Center  Consult Note    Consults  Assessment/Plan:     Patient is a 63 y.o. year old female with:    * Pelvic abscess in female    Reason for admission     Dysarthria     Patient is a 63 year old female with medical history of asthma, COPD, prior stroke (2012 bilateral thalamus), DM 2 who is admitted for pelvic abscess. Stroke code called due to worsening facial weakness and dysarthria.  Patient and family did not notice any significant change in her face and speech.  CT head negative.  Unlikely due to acute ischemic event.  Symptoms could be due to recrudesce of prior stroke (right sided deficits at baseline per family) in the setting of infection.             Severe sepsis    Managed by ID  Improving off pressor support      Essential hypertension    Risk factor for stroke  Avoid hypotension      (HFpEF) heart failure with preserved ejection fraction    Managed by primary team     CAD (coronary artery disease)    Risk factor for stroke       Insulin dependent diabetes mellitus    Risk factor for stroke  Controlled          STROKE DOCUMENTATION          NIH Scale:  Interval: baseline  1a. Level of Consciousness: 0-->Alert, keenly responsive  1b. LOC Questions: 1-->Answers one question correctly  1c. LOC Commands: 0-->Performs both tasks correctly  2. Best Gaze: 0-->Normal  3. Visual: 0-->No visual loss  4. Facial Palsy: 1-->Minor paralysis (flattened nasolabial fold, asymmetry on smiling)  5a. Motor Arm, Left: 1-->Drift, limb holds 90 (or 45) degrees, but drifts down before full 10 seconds, does not hit bed or other support  5b. Motor Arm, Right: 1-->Drift, limb holds 90 (or 45) degrees, but drifts down before full 10 secs, does not hit bed or other support  6a. Motor Leg, Left: 2-->Some effort against gravity, leg falls to bed by 5 secs, but has some effort against gravity  6b. Motor Leg, Right: 2-->Some effort against gravity, leg falls to  bed by 5 secs, but has some effort against gravity  7. Limb Ataxia: 0-->Absent  8. Sensory: 1-->Mild-to-moderate sensory loss, patient feels pinprick is less sharp or is dull on the affected side, or there is a loss of superficial pain with pinprick, but patient is aware of being touched  9. Best Language: 0-->No aphasia, normal  10. Dysarthria: 1-->Mild-to-moderate dysarthria, patient slurs at least some words and, at worst, can be understood with some difficulty  11. Extinction and Inattention (formerly Neglect): 0-->No abnormality  Total (NIH Stroke Scale): 10    Modified [place]    Marcos Coma Scale:    ABCD2 Score:    [id number] Score:   HAS -BLED Score:   ICH Score:   Hunt & Glez Classification:       Thrombolysis Candidate? No, Strong suspicion for stroke mimic or alternative diagnosis       Interventional Revascularization Candidate?   Is the patient eligible for mechanical endovascular reperfusion (ELIGIO)?  No; No large vessel occlusion      Hemorrhagic change of an Ischemic Stroke: Does this patient have an ischemic stroke with hemorrhagic changes? No     Subjective:     History of Present Illness:  Patient is a 63 year old female with medical history of asthma, COPD, prior stroke (2012 bilateral thalamus), DM 2 who presented with nausea, vomiting and abdominal pain.  Patient found to have sepsis and lactic acidosis due to pelvic abscess.  Patient went for exploratory laparoscopy, washout and pelvic abscess drainage.  Infectious disease currently following patient for positive cultures and MRSA bacteremia.  Hospital stay complicated by intubation, metabolic abnormalities requiring CRRT and pressor support.  Stroke code called by nursing staff due to patient having slurred speech and right facial weakness.  Last known normal 10am.  She says her tongue feels heavy and throat sore but not her face.  Family doesn't notice any facial weakness but swelling in neck.  Stroke called d/c due to platelets 23 and no  large vessel occlusion suspected.          Past Medical History:   Diagnosis Date    Abnormal LFTs 12/14/2018    Asthma     Closed compression fracture of fourth lumbar vertebra     COPD (chronic obstructive pulmonary disease)     Coronary artery disease     Diabetes mellitus     Fall 10/4/2018    Fracture     right tib & fib    Glaucoma     High cholesterol     Hypertension     Infected incision 9/20/2018    Intractable nausea and vomiting 1/23/2019    Iritis     Pulmonary embolus     S/P appendectomy 9/11/2018    Stroke     rt sided weakness.     Past Surgical History:   Procedure Laterality Date    ABDOMINAL SURGERY      APPENDECTOMY N/A 8/26/2018    Per[facility] at [facility] OR    APPENDECTOMY, LAPAROSCOPIC---CONVERTED TO OPEN APPENDECTOMY @0950 N/A 8/26/2018    Per[facility] at [facility] OR    APPLICATION, WOUND VAC N/A 1/25/2019    Performed by Monster Laurent [facility] at [facility]    BACK SURGERY      stimulator    CATARACT EXTRACTION      EXCISION, ABSCESS- drainage abdominal wall abscess N/A 1/25/2019    Performed by Monster Laurent MD at [facility] OR 2ND FLR    HYSTERECTOMY      INCISION AND DRAINAGE, ABSCESS-  drainage of pelvic abscess N/A 1/25/2019    Performed by Monster Laurent MD at [facility] OR 2ND FLR    LAPAROTOMY, EXPLORATORY N/A 1/25/2019    Performed by Monster Laurent MD at [facility] OR 2ND [facility]LR    TOTAL HIP ARTHROPLASTY Left     2008     Family History   Problem Relation Age of Onset    Cancer Father     Diabetes Brother     Multiple sclerosis Mother     Lupus Sister      Social History     Tobacco Use    Smoking status: Never Smoker    Smokeless tobacco: Never Used   Substance Use Topics    Alcohol use: No     Frequency: Never    Drug use: No     Review of patient's allergies indicates:   Allergen Reactions    Ace inhibitors Swelling    Carvedilol      Other reaction(s): Other (See Comments)  blister     Hydralazine analogues     Metformin Nausea And Vomiting    Tetracycline Itching    Tetracyclines Swelling    Travatan (with benzalkonium) [travoprost (benzalkonium)]     Travoprost Itching     Other reaction(s): Other (See Comments)  Blurry vision       Medications: I have reviewed the current medication administration record.    Medications Prior to Admission   Medication Sig Dispense Refill Last Dose    acetaminophen (TYLENOL) 500 MG tablet Take 1,000 mg by mouth 2 (two) times daily as needed for Pain.   1/22/2019    albuterol (PROVENTIL/VENTOLIN HFA) 90 mcg/actuation inhaler Inhale 2 puffs into the lungs every 6 (six) hours as needed for Wheezing or Shortness of Breath. Rescue   1/22/2019    albuterol-ipratropium (DUO-NEB) 2.5 mg-0.5 mg/3 mL nebulizer solution Take 3 mLs by nebulization every 4 (four) hours as needed for Wheezing or Shortness of Breath. Rescue    1/22/2019    aspirin (ECOTRIN) 81 MG EC tablet Take 1 tablet (81 mg total) by mouth once daily. 30 tablet 11 1/22/2019    baclofen (LIORESAL) 10 MG tablet Take 10 mg by mouth 2 (two) times daily as needed (MUSCLE SPASMS).   1/22/2019    cefdinir (OMNICEF) 300 MG capsule TK ONE C PO  BID FOR 7 DAYS  0 1/22/2019    cephALEXin (KEFLEX) 750 MG capsule TK ONE C PO TID FOR 5 DAYS  0 1/22/2019    ciprofloxacin HCl (CIPRO) 250 MG tablet Take 1 tablet (250 mg total) by mouth every 12 (twelve) hours. Starting 12/15 evening 5 tablet 0 1/22/2019    clopidogrel (PLAVIX) 75 mg tablet Take 75 mg by mouth once daily.   1/22/2019    diclofenac sodium (VOLTAREN) 1 % Gel Apply 2 g topically daily as needed (PAIN).   1/22/2019    diltiaZEM (CARDIZEM CD) 180 MG 24 hr capsule Take 180 mg by mouth once daily.   1/22/2019    DULoxetine (CYMBALTA) 60 MG capsule Take 60 mg by mouth once daily.   1/22/2019    fluticasone-vilanterol (BREO ELLIPTA) 100-25 mcg/dose diskus inhaler Inhale 1 puff into the lungs once daily. Controller   1/22/2019    gabapentin  (NEURONTIN) 300 MG capsule Take 300 mg by mouth once daily.   1/22/2019    Lactobacillus rhamnosus-fiber 2.5 billion cell-3.5 gram PwPk Take 1 capsule by mouth.   1/22/2019    lansoprazole (PREVACID) 30 MG capsule Take 30 mg by mouth once daily.   1/22/2019    losartan (COZAAR) 100 MG tablet Take 100 mg by mouth once daily.    1/22/2019    ondansetron (ZOFRAN) 4 MG tablet Take 1 tablet (4 mg total) by mouth every 6 (six) hours as needed. 30 tablet 1 1/22/2019    ondansetron (ZOFRAN) 4 MG tablet Take 4-8 mg by mouth.   1/22/2019    potassium chloride SA (K-DUR,KLOR-CON) 10 MEQ tablet Take 10 mEq by mouth.   1/22/2019    predniSONE (DELTASONE) 10 MG tablet Take 4 tablets (40 mg) on 12/16, then take 1 tablet (10 mg) daily 30 tablet 0 1/22/2019    pyridoxine, vitamin B6, (VITAMIN B-6) 50 MG Tab Take 1 tablet (50 mg total) by mouth once daily.  0 1/22/2019    simvastatin (ZOCOR) 40 MG tablet Take 40 mg by mouth.   1/22/2019    theophylline (THEODUR) 300 mg 24 hr capsule Take 300 mg by mouth once daily.   1/22/2019    traMADol (ULTRAM) 50 mg tablet TK 1 T PO BID PRN  0 1/22/2019    [DISCONTINUED] baclofen (LIORESAL) 10 MG tablet Take 10 mg by mouth.   1/22/2019    [DISCONTINUED] furosemide (LASIX) 40 MG tablet Take 40 mg by mouth once daily.    1/22/2019    [DISCONTINUED] HYDROcodone-acetaminophen (NORCO) 5-325 mg per tablet Take 1 tablet by mouth every 4 to 6 hours as needed. 42 tablet 0 1/22/2019    [DISCONTINUED] prazosin (MINIPRESS) 5 MG capsule TK 1 C PO BID  13 1/22/2019       Review of Systems   Constitutional: Negative for chills and fever.   HENT: Negative for congestion and drooling.    Eyes: Negative for discharge and visual disturbance.   Respiratory: Negative for cough and shortness of breath.    Cardiovascular: Negative for palpitations and leg swelling.   Gastrointestinal: Positive for nausea and vomiting.   Genitourinary: Negative for dysuria and urgency.   Musculoskeletal: Positive for  arthralgias, back pain and gait problem.   Skin: Negative for rash.   Neurological: Positive for facial asymmetry, speech difficulty and weakness. Negative for headaches.   Psychiatric/Behavioral: Negative for agitation and behavioral problems.     Objective:     Vital Signs (Most Recent):  Temp: 98 °F (36.7 °C) (01/30/19 1200)  Pulse: 99 (01/30/19 1329)  Resp: (!) 8 (01/30/19 1329)  BP: (!) 143/78 (01/30/19 1200)  SpO2: 98 % (01/30/19 1329)    Vital Signs Range (Last 24H):  Temp:  [97.4 °F (36.3 °C)-98.9 °F (37.2 °C)]   Pulse:  []   Resp:  [8-24]   BP: (109-170)/()   SpO2:  [93 %-100 %]     Physical Exam   Constitutional: She appears well-developed and well-nourished.   HENT:   Head: Normocephalic and atraumatic.   Eyes: EOM are normal. Pupils are equal, round, and reactive to light.   Neck: Normal range of motion. Neck supple.   Cardiovascular: Normal rate and regular rhythm.   Pulmonary/Chest: Effort normal. No respiratory distress.   Abdominal: She exhibits no distension. There is no guarding.   Musculoskeletal: She exhibits no edema.   Neurological:   See below   Skin: Skin is warm and dry.   Psychiatric: She has a normal mood and affect.       Neurological Exam:   LOC: alert  Attention Span: Good   Language: No aphasia  Articulation: Dysarthria  Orientation:not month but age   Visual Fields: Full  EOM (CN III, IV, VI): Full/intact  Pupils (CN II, III): PERRL  Facial Sensation (CN V): Normal  Facial Movement (CN VII): Lower facial weakness on the Right  Gag Reflex: not examined  Reflexes: not examined   Motor: Arm left  Paresis: 3/5  Leg left  Paresis: 3/5  Arm right  Paresis: 3/5  Leg right Paresis: 3/5  Cebellar: No evidence of appendicular or axial ataxia  Sensation: Intact to light touch, temperature and vibration  Tone: Normal tone throughout      Laboratory:  CMP:   Recent Labs   Lab 01/30/19  0909 01/30/19  1200   CALCIUM 7.7*  --    ALBUMIN 1.8*  --    PROT 4.0*  --    *  --    K 5.0  4.7   CO2 27  --      --    BUN 10  --    CREATININE 0.7  --    ALKPHOS 376*  --    ALT 50*  --    AST 58*  --    BILITOT 3.9*  --      CBC:   Recent Labs   Lab 01/30/19  0650   WBC 11.18   RBC 2.86*   HGB 7.6*   HCT 22.7*   PLT 23*   MCV 79*   MCH 26.6*   MCHC 33.5     Lipid Panel: No results for input(s): CHOL, LDLCALC, HDL, TRIG in the last 168 hours.  Hgb A1C:   Recent Labs   Lab 01/24/19  0522   HGBA1C 4.7     TSH: No results for input(s): TSH in the last 168 hours.    Diagnostic Results:      Brain imaging:  CT head 1/30/19  No acute intracranial abnormalities       Vessel Imaging:      Cardiac Evaluation:         Olivia Escobedo PA-C  Comprehensive Stroke Center  Department of Vascular Neurology   [facility]

## 2019-01-30 NOTE — PLAN OF CARE
Problem: Adult Inpatient Plan of Care  Goal: Plan of Care Review  PMH: HTN, COPD, HF, DMII, CVA (2012; R-side weakness residual); hip fx; FTT; PE    DX: s/p ex-lap I&D R hemipelvis fluid collection 1/25 post-op hypotension/acidosis requiring fluid rescus.     1/26: SCUF overnight, electrolyte repletion, trending lactate. (9.6->6.9->6.2->6.0); restart TPN;   1/28: extubated; insulin gtt off; Vaso off; D10% gtt started; HIT panel sent        Nursing:   MAP >65    Q1 BG        Outcome: Ongoing (interventions implemented as appropriate)  Patient AAOx3(not to time). Daughter at bedside at the beginning of the shift. Plan of care and all safety precautions maintained and discussed. HR SR-ST on tele. TPN at 65 mL/hr, lipids at 20.8 mL/hr. Wound vac to abdomen intact at 125 mmHg. PRN medication administered for pain. No acute events throughout the shift. Call bell in reach. Encouraged to call for assistance. Verbalized understanding. Will continue to monitor.

## 2019-01-30 NOTE — PHYSICIAN QUERY
"PT Name: Sheryl Martines  MR #: [id number]    Physician Query Form - Heart  Condition Clarification     CDS/: JUDY Ge,RNC-[email or web address]          Contact information:[email or web address]  This form is a permanent document in the medical record.     Query Date: January 30, 2019    By submitting this query, we are merely seeking further clarification of documentation. Please utilize your independent clinical judgment when addressing the question(s) below.    The medical record contains the following   Indicators     Supporting Clinical Findings Location in Medical Record    BNP     X EF EF 60 General Surgery Progress note 1/29@1203pm    Radiology findings     X Echo Results Last echo 8/2018 with no WMAs, grade 1DD, EF 60 General Surgery Progress note 1/29@1203pm    "Ascites" documented     X "SOB" or "RUIZ" documented She was transferred to PACU post-op for recovery and noted to be hypotensive and SOB  Nephrology Progress note 1/28@1134pm    "Hypoxia" documented     X Heart Failure documented (HFpEF) heart failure with preserved ejection fraction General Surgery Progress note 1/29@1203pm   X "Edema" documented Musculoskeletal: She exhibits edema (upper extremities).  Nephrology Progress note 1/28@1134pm   X Diuretics/Meds furosemide injection 40 mg MAR 1/29-1/30   X Treatment: -strict I/O, daily weights  -start diuresis today General Surgery Progress note 1/29@1203pm    Other:      Heart failure (HF) can be acute, chronic or both. It is generally further specificed as systolic, diastolic, or combined. Lastly, it is important to identify an underlying etiology if known or suspected.     Common clues to acute exacerbation:  Rapidly progressive symptoms (w/in 2 weeks of presentation), using IV diuretics to treat, using supplemental O2, pulmonary edema on Xray, MI w/in 4 weeks, and/or BNP >500    Systolic Heart Failure: is defined as chart documentation of a left ventricular ejection fraction (LVEF) less than " 40%     Diastolic Heart Failure: is defined as a left ventricular ejection fraction (LVEF) greater than 40%   +      Evidence of diastolic dysfunction on echocardiography OR    Right heart catheterization wedge pressure above 12 mm Hg OR    Left heart catheterization left ventricular end diastolic pressure 18 mm Hg or above.    References: *American Heart Association    The clinical guidelines noted below are only system guidelines, and do not replace the providers clinical judgment.     Provider, please specify the diagnosis associated with above clinical findings  [  x ] Acute on Chronic Diastolic Heart Failure -    Pre-existing diastoic HF diagnosis.  EF > 40%  and acute HF symptoms documented                                 [   ] Chronic Diastolic Heart Failure - Pre-existing diastolic HF diagnosis.  EF > 40%  without  acute HF symptoms documented  [   ] Other Type of Heart Failure (please specify type): _________________________  [   ] Heart Failure Ruled Out  [   ] Other (please specify): ___________________________________  [   ] Clinically Undetermined                          Please document in your progress notes daily for the duration of treatment until resolved and include in your discharge summary.

## 2019-01-30 NOTE — SUBJECTIVE & OBJECTIVE
Interval History/Significant Events: NAEON. Started on TPN yesterday; with initiation was able to come off of D10 and insulin gtt. Glucose levels staying stable. Has been in and out of sinus rhythm and sinus tachy on tele overnight. Receiving prn pain medications; tolerating well. This am noting right arm and shoulder pain; with complete EKG/troponin lab work for further workup.    Follow-up For: Procedure(s) (LRB):  LAPAROTOMY, EXPLORATORY (N/A)  INCISION AND DRAINAGE, ABSCESS-  drainage of pelvic abscess (N/A)  EXCISION, ABSCESS- drainage abdominal wall abscess (N/A)  APPLICATION, WOUND VAC (N/A)    Post-Operative Day: 5 Days Post-Op    Objective:     Vital Signs (Most Recent):  Temp: 98.1 °F (36.7 °C) (01/30/19 0745)  Pulse: 106 (01/30/19 0900)  Resp: (!) 21 (01/30/19 0900)  BP: (!) 159/91 (01/30/19 0900)  SpO2: 95 % (01/30/19 0900) Vital Signs (24h Range):  Temp:  [97.4 °F (36.3 °C)-98.9 °F (37.2 °C)] 98.1 °F (36.7 °C)  Pulse:  [] 106  Resp:  [10-24] 21  SpO2:  [93 %-100 %] 95 %  BP: (109-170)/() 159/91     Weight: 71.2 kg (157 lb)  Body mass index is 29.66 kg/m².      Intake/Output Summary (Last 24 hours) at 1/30/2019 0954  Last data filed at 1/30/2019 0917  Gross per 24 hour   Intake 5297.6 ml   Output 3430 ml   Net 1867.6 ml       Physical Exam   Constitutional: She appears well-developed and well-nourished. No distress.   HENT:   Head: Normocephalic.   Eyes: Pupils are equal, round, and reactive to light.   Cardiovascular: Regular rhythm.   Tachycardic   Pulmonary/Chest: No respiratory distress. She has no wheezes.   Extubated on RA.   Abdominal: Soft. She exhibits no distension.   Neurological:   lethargic   Skin: Skin is warm and dry.       Vents: Not on ventilator.      Lines/Drains/Airways     Central Venous Catheter Line                 Port A Cath Single Lumen 01/23/19 1400 right subclavian 6 days         Percutaneous Central Line Insertion/Assessment - triple lumen  01/26/19 0010 left  internal jugular 4 days         Trialysis (Dialysis) Catheter 01/26/19 1200 right internal jugular 3 days          Drain                 Urethral Catheter 01/25/19 0934 Straight-tip;Non-latex 16 Fr. 5 days         Closed/Suction Drain 01/25/19 1020 Right;Ventral Abdomen Bulb 19 Fr. 4 days          Pressure Ulcer                 Negative Pressure Wound Therapy  4 days                Significant Labs:    CBC/Anemia Profile:  Recent Labs   Lab 01/29/19  0400 01/29/19  1959 01/30/19  0650   WBC 12.56 11.59 11.18   HGB 8.0* 7.2* 7.6*   HCT 23.4* 20.6* 22.7*   PLT 26* 20* 23*   MCV 77* 78* 79*   RDW 18.5* 18.9* 19.1*        Chemistries:  Recent Labs   Lab 01/29/19  0803 01/29/19  1645 01/30/19  0001   * 135* 134*   K 4.2 3.8 4.5    103 102   CO2 27 26 27   BUN 6* 6* 7*   CREATININE 0.6 0.6 0.7   CALCIUM 7.5* 7.2* 7.4*   ALBUMIN 1.7* 1.6* 1.6*   PROT 3.6* 3.4* 3.6*   BILITOT 2.7* 2.9* 3.1*   ALKPHOS 338* 318* 328*   ALT 48* 46* 50*   * 88* 76*   MG 2.0 1.9 2.0   PHOS 2.8 2.8 3.1       All pertinent labs within the past 24 hours have been reviewed.    Significant Imaging:  I have reviewed all pertinent imaging results/findings within the past 24 hours.

## 2019-01-30 NOTE — PROGRESS NOTES
"[facility]  Endocrinology  Progress Note    Admit Date: 1/23/2019     Reason for Consult: Management of T2DM, Hyperglycemia     Surgical Procedure and Date:      LAPAROTOMY, EXPLORATORY (N/A)     INCISION AND DRAINAGE, ABSCESS-  drainage of pelvic abscess (N/A)     EXCISION, ABSCESS- drainage abdominal wall abscess (N/A)     APPLICATION, WOUND VAC (N/A)       Diabetes diagnosis year: 20 years ago    Home Diabetes Medications:       Levemir 20 units BID     Humalog 16 units TIDWM with correction     How often checking glucose at home? >4 x day   BG readings on regimen: 110's  Hypoglycemia on the regimen?  Yes - 40's  Missed doses on regimen?  No    Diabetes Complications include:     Hypoglycemia     Complicating diabetes co morbidities:   HLD, CVA, and CAD    Lab Results   Component Value Date    HGBA1C 4.7 01/24/2019       HPI:   Patient is a 63 y.o. female with a diagnosis of sepsis and lactic acidosis secondary to abdominal abscess. Also has diagnosis of COPD, asthma, CAD, CVA (2012), and DM2. Patient receives primary care for DM in [place]. Her primary care giver is her daughter. Patient's DM is well controlled by primary Endocrinologist in [place]. Patient has not been on insulin regimen for the past 3 weeks leading up to hospitalization. According to daughter, patient was not eating, and having low BG reading. Therefore, insulin regimen was stopped by patient's primary Endocrinologist. Endocrinology at [facility] consulted to manage DM/hyperglycemia.             Interval HPI:   Overnight events:    Remains in SICU. Now on TPN. D10 infusion has been discontinued. Expect BG elevations. Will continue IIP.     Eating:   NPO  Nausea: No  Hypoglycemia and intervention: No  Fever: No  TPN and/or TF: Yes (TPN)  If yes, type of TF/TPN and rate: 65 cc/hr    BP (!) 159/91 (BP Location: Right arm, Patient Position: Lying)   Pulse 106   Temp 98.1 °F (36.7 °C) (Oral)   Resp (!) 21   Ht 5' 1" (1.549 m)   Wt " 71.2 kg (157 lb)   LMP  (LMP Unknown)   SpO2 95%   Breastfeeding? No   BMI 29.66 kg/m²      Labs Reviewed and Include    Recent Labs   Lab 01/30/19  0001   *   CALCIUM 7.4*   ALBUMIN 1.6*   PROT 3.6*   *   K 4.5   CO2 27      BUN 7*   CREATININE 0.7   ALKPHOS 328*   ALT 50*   AST 76*   BILITOT 3.1*     Lab Results   Component Value Date    WBC 11.18 01/30/2019    HGB 7.6 (L) 01/30/2019    HCT 22.7 (L) 01/30/2019    MCV 79 (L) 01/30/2019    PLT 23 (LL) 01/30/2019     No results for input(s): TSH, FREET4 in the last 168 hours.  Lab Results   Component Value Date    HGBA1C 4.7 01/24/2019       Nutritional status:   Body mass index is 29.66 kg/m².  Lab Results   Component Value Date    ALBUMIN 1.6 (L) 01/30/2019    ALBUMIN 1.6 (L) 01/29/2019    ALBUMIN 1.7 (L) 01/29/2019     Lab Results   Component Value Date    PREALBUMIN 3 (L) 01/28/2019    PREALBUMIN 5 (L) 01/24/2019    PREALBUMIN 20 10/05/2018       Estimated Creatinine Clearance: 74.3 mL/min (based on SCr of 0.7 mg/dL).    Accu-Checks  Recent Labs     01/29/19  1644 01/29/19  1807 01/29/19  1959 01/29/19  2057 01/29/19  2203 01/29/19  2258 01/29/19  2359 01/30/19  0059 01/30/19  0304 01/30/19  0805   POCTGLUCOSE 149* 134* 164* 136* 156* 184* 200* 181* 185* 200*       Current Medications and/or Treatments Impacting Glycemic Control  Immunotherapy:    Immunosuppressants     None        Steroids:   Hormones (From admission, onward)    Start     Stop Route Frequency Ordered    01/30/19 2100  hydrocortisone sodium succinate injection 50 mg      -- IV 2 times daily 01/30/19 0928        Pressors:    Autonomic Drugs (From admission, onward)    None        Hyperglycemia/Diabetes Medications:   Antihyperglycemics (From admission, onward)    Start     Stop Route Frequency Ordered    01/27/19 1400  insulin regular (Humulin R) 100 Units in sodium chloride 0.9% 100 mL infusion     Question:  Insulin Rate Adjustment (DO NOT MODIFY ANSWER)  Answer:   [email or web address]    -- IV Continuous 01/27/19 1254          ASSESSMENT and PLAN    * Pelvic abscess in female    Managed per primary team  Avoid hypoglycemia  Optimize BG control to improve wound healing         Insulin dependent diabetes mellitus    BG goal 140 - 180     Continue IV insulin infusion protocol. Start Insulin infusion at 0.3 units/hr.   Requires intensive BG monitoring while on protocol. Every 2 hours.      ** Please notify Endocrine for any change and/or advance in diet**  ** Please call Endocrine for any BG related issues **      Discharge planning: TBD         Adverse effect of adrenal cortical steroids, sequela    On steroid therapy per primary team; may elevate BG readings         CAD (coronary artery disease)    Managed per primary team  Condition may cause insulin resistance          Overweight (BMI 25.0-29.9)    Body mass index is 29.66 kg/m².  may contribute to insulin resistance         NIKHIL (acute kidney injury)      Caution with insulin stacking           Fredrick Ruiz NP  Endocrinology  [facility]

## 2019-01-30 NOTE — HPI
63 yr old female with PMH of asthma, COPD on home O2, CVA ( reported b/l thalamic infarcts in 2012), DM 2 who is admitted for abdominal abscess. She is s/p exp lap with I&D. Abscess cultures growing anaerobic bacteria and she is on antibiotics. Stroke code called at 7:20 am for worsening mental status since 10pm yesterday.  Patient has baseline RSW, facial droop and slurring of speech from her prior stroke. Patient was last normal at 10pm when she was speaking to her daughter. After the call, her slurring worsened and her mentation started to fluctuate. Today morning she was noted to be significantly weak and not following any commands. Also had intermittent tremors of her L hand. CT head showed a remote L thalamic infarct but no acute infarct/bleed.  She was previously seen by Vascular Neurology on 1/31 for acute worsening of her stroke deficits. CT head at the time did not show any acute changes and the etiology was suspected to be recrudescence.   She underwent a lap appendectomy in Aug 2018 that was complicated by a wound infection, C diff and failure to thrive. At baseline now she is wheelchair bound and requires help with her ADLs.

## 2019-01-30 NOTE — SUBJECTIVE & OBJECTIVE
Past Medical History:   Diagnosis Date    Abnormal LFTs 12/14/2018    Asthma     Closed compression fracture of fourth lumbar vertebra     COPD (chronic obstructive pulmonary disease)     Coronary artery disease     Diabetes mellitus     Fall 10/4/2018    Fracture     right tib & fib    Glaucoma     High cholesterol     Hypertension     Infected incision 9/20/2018    Intractable nausea and vomiting 1/23/2019    Iritis     Pulmonary embolus     S/P appendectomy 9/11/2018    Stroke     rt sided weakness.     Past Surgical History:   Procedure Laterality Date    ABDOMINAL SURGERY      APPENDECTOMY N/A 8/26/2018    Per[facility] at [facility]    APPENDECTOMY, LAPAROSCOPIC---CONVERTED TO OPEN APPENDECTOMY @0950 N/A 8/26/2018    Per[facility] at [facility] OR    APPLICATION, WOUND VAC N/A 1/25/2019    Performed by Monster Laurent [facility] at [facility] OR [address] [place] [address]    BACK SURGERY      stimulator    CATARACT EXTRACTION      EXCISION, ABSCESS- drainage abdominal wall abscess N/A 1/25/2019    [facility]    HYSTERECTOMY      INCISION AND DRAINAGE, ABSCESS-  drainage of pelvic abscess N/A 1/25/2019    Performed by Monster Laurent MD at [facility] OR [address] [place] [address]    LAPAROTOMY, EXPLORATORY N/A 1/25/2019    [facility]    TOTAL HIP ARTHROPLASTY Left     2008     Family History   Problem Relation Age of Onset    Cancer Father     Diabetes Brother     Multiple sclerosis Mother     Lupus Sister      Social History     Tobacco Use    Smoking status: Never Smoker    Smokeless tobacco: Never Used   Substance Use Topics    Alcohol use: No     Frequency: Never    Drug use: No     Review of patient's allergies indicates:   Allergen Reactions    Ace inhibitors Swelling    Carvedilol      Other reaction(s): Other (See Comments)  blister    Hydralazine analogues     Metformin Nausea And  Vomiting    Tetracycline Itching    Tetracyclines Swelling    Travatan (with benzalkonium) [travoprost (benzalkonium)]     Travoprost Itching     Other reaction(s): Other (See Comments)  Blurry vision       Medications: I have reviewed the current medication administration record.    Medications Prior to Admission   Medication Sig Dispense Refill Last Dose    acetaminophen (TYLENOL) 500 MG tablet Take 1,000 mg by mouth 2 (two) times daily as needed for Pain.   1/22/2019    albuterol (PROVENTIL/VENTOLIN HFA) 90 mcg/actuation inhaler Inhale 2 puffs into the lungs every 6 (six) hours as needed for Wheezing or Shortness of Breath. Rescue   1/22/2019    albuterol-ipratropium (DUO-NEB) 2.5 mg-0.5 mg/3 mL nebulizer solution Take 3 mLs by nebulization every 4 (four) hours as needed for Wheezing or Shortness of Breath. Rescue    1/22/2019    aspirin (ECOTRIN) 81 MG EC tablet Take 1 tablet (81 mg total) by mouth once daily. 30 tablet 11 1/22/2019    baclofen (LIORESAL) 10 MG tablet Take 10 mg by mouth 2 (two) times daily as needed (MUSCLE SPASMS).   1/22/2019    cefdinir (OMNICEF) 300 MG capsule TK ONE C PO  BID FOR 7 DAYS  0 1/22/2019    cephALEXin (KEFLEX) 750 MG capsule TK ONE C PO TID FOR 5 DAYS  0 1/22/2019    ciprofloxacin HCl (CIPRO) 250 MG tablet Take 1 tablet (250 mg total) by mouth every 12 (twelve) hours. Starting 12/15 evening 5 tablet 0 1/22/2019    clopidogrel (PLAVIX) 75 mg tablet Take 75 mg by mouth once daily.   1/22/2019    diclofenac sodium (VOLTAREN) 1 % Gel Apply 2 g topically daily as needed (PAIN).   1/22/2019    diltiaZEM (CARDIZEM CD) 180 MG 24 hr capsule Take 180 mg by mouth once daily.   1/22/2019    DULoxetine (CYMBALTA) 60 MG capsule Take 60 mg by mouth once daily.   1/22/2019    fluticasone-vilanterol (BREO ELLIPTA) 100-25 mcg/dose diskus inhaler Inhale 1 puff into the lungs once daily. Controller   1/22/2019    gabapentin (NEURONTIN) 300 MG capsule Take 300 mg by mouth once  daily.   1/22/2019    Lactobacillus rhamnosus-fiber 2.5 billion cell-3.5 gram PwPk Take 1 capsule by mouth.   1/22/2019    lansoprazole (PREVACID) 30 MG capsule Take 30 mg by mouth once daily.   1/22/2019    losartan (COZAAR) 100 MG tablet Take 100 mg by mouth once daily.    1/22/2019    ondansetron (ZOFRAN) 4 MG tablet Take 1 tablet (4 mg total) by mouth every 6 (six) hours as needed. 30 tablet 1 1/22/2019    ondansetron (ZOFRAN) 4 MG tablet Take 4-8 mg by mouth.   1/22/2019    potassium chloride SA (K-DUR,KLOR-CON) 10 MEQ tablet Take 10 mEq by mouth.   1/22/2019    predniSONE (DELTASONE) 10 MG tablet Take 4 tablets (40 mg) on 12/16, then take 1 tablet (10 mg) daily 30 tablet 0 1/22/2019    pyridoxine, vitamin B6, (VITAMIN B-6) 50 MG Tab Take 1 tablet (50 mg total) by mouth once daily.  0 1/22/2019    simvastatin (ZOCOR) 40 MG tablet Take 40 mg by mouth.   1/22/2019    theophylline (THEODUR) 300 mg 24 hr capsule Take 300 mg by mouth once daily.   1/22/2019    traMADol (ULTRAM) 50 mg tablet TK 1 T PO BID PRN  0 1/22/2019    [DISCONTINUED] baclofen (LIORESAL) 10 MG tablet Take 10 mg by mouth.   1/22/2019    [DISCONTINUED] furosemide (LASIX) 40 MG tablet Take 40 mg by mouth once daily.    1/22/2019    [DISCONTINUED] HYDROcodone-acetaminophen (NORCO) 5-325 mg per tablet Take 1 tablet by mouth every 4 to 6 hours as needed. 42 tablet 0 1/22/2019    [DISCONTINUED] prazosin (MINIPRESS) 5 MG capsule TK 1 C PO BID  13 1/22/2019       Review of Systems   Constitutional: Negative for chills and fever.   HENT: Negative for congestion and drooling.    Eyes: Negative for discharge and visual disturbance.   Respiratory: Negative for cough and shortness of breath.    Cardiovascular: Negative for palpitations and leg swelling.   Gastrointestinal: Positive for nausea and vomiting.   Genitourinary: Negative for dysuria and urgency.   Musculoskeletal: Positive for arthralgias, back pain and gait problem.   Skin: Negative  for rash.   Neurological: Positive for facial asymmetry, speech difficulty and weakness. Negative for headaches.   Psychiatric/Behavioral: Negative for agitation and behavioral problems.     Objective:     Vital Signs (Most Recent):  Temp: 98 °F (36.7 °C) (01/30/19 1200)  Pulse: 99 (01/30/19 1329)  Resp: (!) 8 (01/30/19 1329)  BP: (!) 143/78 (01/30/19 1200)  SpO2: 98 % (01/30/19 1329)    Vital Signs Range (Last 24H):  Temp:  [97.4 °F (36.3 °C)-98.9 °F (37.2 °C)]   Pulse:  []   Resp:  [8-24]   BP: (109-170)/()   SpO2:  [93 %-100 %]     Physical Exam   Constitutional: She appears well-developed and well-nourished.   HENT:   Head: Normocephalic and atraumatic.   Eyes: EOM are normal. Pupils are equal, round, and reactive to light.   Neck: Normal range of motion. Neck supple.   Cardiovascular: Normal rate and regular rhythm.   Pulmonary/Chest: Effort normal. No respiratory distress.   Abdominal: She exhibits no distension. There is no guarding.   Musculoskeletal: She exhibits no edema.   Neurological:   See below   Skin: Skin is warm and dry.   Psychiatric: She has a normal mood and affect.       Neurological Exam:   LOC: alert  Attention Span: Good   Language: No aphasia  Articulation: Dysarthria  Orientation:not month but age   Visual Fields: Full  EOM (CN III, IV, VI): Full/intact  Pupils (CN II, III): PERRL  Facial Sensation (CN V): Normal  Facial Movement (CN VII): Lower facial weakness on the Right  Gag Reflex: not examined  Reflexes: not examined   Motor: Arm left  Paresis: 3/5  Leg left  Paresis: 3/5  Arm right  Paresis: 3/5  Leg right Paresis: 3/5  Cebellar: No evidence of appendicular or axial ataxia  Sensation: Intact to light touch, temperature and vibration  Tone: Normal tone throughout      Laboratory:  CMP:   Recent Labs   Lab 01/30/19  0909 01/30/19  1200   CALCIUM 7.7*  --    ALBUMIN 1.8*  --    PROT 4.0*  --    *  --    K 5.0 4.7   CO2 27  --      --    BUN 10  --    CREATININE  0.7  --    ALKPHOS 376*  --    ALT 50*  --    AST 58*  --    BILITOT 3.9*  --      CBC:   Recent Labs   Lab 01/30/19  0650   WBC 11.18   RBC 2.86*   HGB 7.6*   HCT 22.7*   PLT 23*   MCV 79*   MCH 26.6*   MCHC 33.5     Lipid Panel: No results for input(s): CHOL, LDLCALC, HDL, TRIG in the last 168 hours.  Hgb A1C:   Recent Labs   Lab 01/24/19  0522   HGBA1C 4.7     TSH: No results for input(s): TSH in the last 168 hours.    Diagnostic Results:      Brain imaging:  CT head 1/30/19  No acute intracranial abnormalities       Vessel Imaging:      Cardiac Evaluation:

## 2019-01-30 NOTE — PLAN OF CARE
Problem: SLP Goal  Goal: SLP Goal  Speech Language Pathology Goals  Goals expected to be met by 2/6/19  1. Pt will participate in ongoing swallow assessment  2. Educate Pt and family on S/S aspiration and aspiration precautions        Outcome: Ongoing (interventions implemented as appropriate)  Bedside swallow study initiated.  Patient with palatal swelling and decreased lingual strength/coordination.  Pt coughing on secretions and c/o nausea. Pt not appropriate for PO intake at this time. REC: Continue NPO with alternative means nutrition/hydration/medication, strict aspiration precautions, and possible consult with oral surgeon/Dentist.  Findings reviewed with Pt, Daughters, Nurse and MD team. MD team aware of concerns for palatal abscess. ST to continue to f/u.      NAVID Cobb., [facility]  Speech-Language Pathology  Pager: [phone]  1/30/2019

## 2019-01-31 PROBLEM — M26.79: Status: ACTIVE | Noted: 2019-01-31

## 2019-01-31 PROBLEM — R23.9 ALTERATION IN SKIN INTEGRITY DUE TO MOISTURE: Status: ACTIVE | Noted: 2019-01-31

## 2019-01-31 LAB
ALBUMIN SERPL BCP-MCNC: 1.6 G/DL
ALBUMIN SERPL BCP-MCNC: 1.7 G/DL
ALBUMIN SERPL BCP-MCNC: 1.7 G/DL
ALP SERPL-CCNC: 779 U/L
ALP SERPL-CCNC: 955 U/L
ALP SERPL-CCNC: 996 U/L
ALT SERPL W/O P-5'-P-CCNC: 51 U/L
ALT SERPL W/O P-5'-P-CCNC: 66 U/L
ALT SERPL W/O P-5'-P-CCNC: 71 U/L
ANION GAP SERPL CALC-SCNC: 7 MMOL/L
ANION GAP SERPL CALC-SCNC: 7 MMOL/L
ANION GAP SERPL CALC-SCNC: 8 MMOL/L
AST SERPL-CCNC: 100 U/L
AST SERPL-CCNC: 107 U/L
AST SERPL-CCNC: 67 U/L
BACTERIA BLD CULT: NORMAL
BACTERIA BLD CULT: NORMAL
BASOPHILS # BLD AUTO: 0 K/UL
BASOPHILS # BLD AUTO: 0.01 K/UL
BASOPHILS NFR BLD: 0 %
BASOPHILS NFR BLD: 0.1 %
BILIRUB SERPL-MCNC: 4 MG/DL
BILIRUB SERPL-MCNC: 4 MG/DL
BILIRUB SERPL-MCNC: 4.5 MG/DL
BLD PROD TYP BPU: NORMAL
BLD PROD TYP BPU: NORMAL
BLOOD UNIT EXPIRATION DATE: NORMAL
BLOOD UNIT EXPIRATION DATE: NORMAL
BLOOD UNIT TYPE CODE: 6200
BLOOD UNIT TYPE CODE: 6200
BLOOD UNIT TYPE: NORMAL
BLOOD UNIT TYPE: NORMAL
BUN SERPL-MCNC: 17 MG/DL
BUN SERPL-MCNC: 21 MG/DL
BUN SERPL-MCNC: 22 MG/DL
CA-I BLDV-SCNC: 1.05 MMOL/L
CA-I BLDV-SCNC: 1.08 MMOL/L
CA-I BLDV-SCNC: 1.1 MMOL/L
CALCIUM SERPL-MCNC: 7.5 MG/DL
CALCIUM SERPL-MCNC: 7.6 MG/DL
CALCIUM SERPL-MCNC: 7.6 MG/DL
CHLORIDE SERPL-SCNC: 100 MMOL/L
CHLORIDE SERPL-SCNC: 100 MMOL/L
CHLORIDE SERPL-SCNC: 99 MMOL/L
CO2 SERPL-SCNC: 26 MMOL/L
CO2 SERPL-SCNC: 29 MMOL/L
CO2 SERPL-SCNC: 31 MMOL/L
CODING SYSTEM: NORMAL
CODING SYSTEM: NORMAL
CREAT SERPL-MCNC: 0.7 MG/DL
DIFFERENTIAL METHOD: ABNORMAL
DIFFERENTIAL METHOD: ABNORMAL
DISPENSE STATUS: NORMAL
DISPENSE STATUS: NORMAL
EOSINOPHIL # BLD AUTO: 0 K/UL
EOSINOPHIL # BLD AUTO: 0 K/UL
EOSINOPHIL NFR BLD: 0 %
EOSINOPHIL NFR BLD: 0.1 %
ERYTHROCYTE [DISTWIDTH] IN BLOOD BY AUTOMATED COUNT: 18.6 %
ERYTHROCYTE [DISTWIDTH] IN BLOOD BY AUTOMATED COUNT: 19.1 %
EST. GFR  (AFRICAN AMERICAN): >60 ML/MIN/1.73 M^2
EST. GFR  (NON AFRICAN AMERICAN): >60 ML/MIN/1.73 M^2
GLUCOSE SERPL-MCNC: 103 MG/DL
GLUCOSE SERPL-MCNC: 137 MG/DL
GLUCOSE SERPL-MCNC: 225 MG/DL
HCT VFR BLD AUTO: 19.4 %
HCT VFR BLD AUTO: 20.5 %
HGB BLD-MCNC: 6.4 G/DL
HGB BLD-MCNC: 7 G/DL
IMM GRANULOCYTES # BLD AUTO: 0.11 K/UL
IMM GRANULOCYTES # BLD AUTO: 0.16 K/UL
IMM GRANULOCYTES NFR BLD AUTO: 1.2 %
IMM GRANULOCYTES NFR BLD AUTO: 1.8 %
LYMPHOCYTES # BLD AUTO: 0.8 K/UL
LYMPHOCYTES # BLD AUTO: 1.1 K/UL
LYMPHOCYTES NFR BLD: 13.1 %
LYMPHOCYTES NFR BLD: 8.4 %
MAGNESIUM SERPL-MCNC: 2 MG/DL
MAGNESIUM SERPL-MCNC: 2.2 MG/DL
MAGNESIUM SERPL-MCNC: 2.3 MG/DL
MCH RBC QN AUTO: 26.2 PG
MCH RBC QN AUTO: 26.8 PG
MCHC RBC AUTO-ENTMCNC: 33 G/DL
MCHC RBC AUTO-ENTMCNC: 34.1 G/DL
MCV RBC AUTO: 79 FL
MCV RBC AUTO: 80 FL
MONOCYTES # BLD AUTO: 0.9 K/UL
MONOCYTES # BLD AUTO: 1 K/UL
MONOCYTES NFR BLD: 11.3 %
MONOCYTES NFR BLD: 9.8 %
NEUTROPHILS # BLD AUTO: 6.4 K/UL
NEUTROPHILS # BLD AUTO: 7.2 K/UL
NEUTROPHILS NFR BLD: 73.6 %
NEUTROPHILS NFR BLD: 80.6 %
NRBC BLD-RTO: 1 /100 WBC
NRBC BLD-RTO: 1 /100 WBC
PHOSPHATE SERPL-MCNC: 2.9 MG/DL
PHOSPHATE SERPL-MCNC: 3.3 MG/DL
PHOSPHATE SERPL-MCNC: 3.6 MG/DL
PLATELET # BLD AUTO: 122 K/UL
PLATELET # BLD AUTO: 43 K/UL
PMV BLD AUTO: 10.8 FL
PMV BLD AUTO: ABNORMAL FL
POCT GLUCOSE: 113 MG/DL (ref 70–110)
POCT GLUCOSE: 124 MG/DL (ref 70–110)
POCT GLUCOSE: 143 MG/DL (ref 70–110)
POCT GLUCOSE: 156 MG/DL (ref 70–110)
POCT GLUCOSE: 164 MG/DL (ref 70–110)
POCT GLUCOSE: 165 MG/DL (ref 70–110)
POCT GLUCOSE: 171 MG/DL (ref 70–110)
POCT GLUCOSE: 193 MG/DL (ref 70–110)
POCT GLUCOSE: 204 MG/DL (ref 70–110)
POCT GLUCOSE: 223 MG/DL (ref 70–110)
POTASSIUM SERPL-SCNC: 3 MMOL/L
POTASSIUM SERPL-SCNC: 3.5 MMOL/L
POTASSIUM SERPL-SCNC: 3.9 MMOL/L
PREALB SERPL-MCNC: 6 MG/DL
PROT SERPL-MCNC: 3.9 G/DL
PROT SERPL-MCNC: 4 G/DL
PROT SERPL-MCNC: 4 G/DL
RBC # BLD AUTO: 2.44 M/UL
RBC # BLD AUTO: 2.61 M/UL
SODIUM SERPL-SCNC: 134 MMOL/L
SODIUM SERPL-SCNC: 136 MMOL/L
SODIUM SERPL-SCNC: 137 MMOL/L
TRANS ERYTHROCYTES VOL PATIENT: NORMAL ML
TRANS PLATPHERESIS VOL PATIENT: NORMAL ML
TRIGL SERPL-MCNC: 90 MG/DL
VANCOMYCIN SERPL-MCNC: 18.5 UG/ML
VANCOMYCIN TROUGH SERPL-MCNC: 17.1 UG/ML
WBC # BLD AUTO: 8.67 K/UL
WBC # BLD AUTO: 8.88 K/UL

## 2019-01-31 PROCEDURE — P9021 RED BLOOD CELLS UNIT: HCPCS

## 2019-01-31 PROCEDURE — 25000003 PHARM REV CODE 250: Performed by: STUDENT IN AN ORGANIZED HEALTH CARE EDUCATION/TRAINING PROGRAM

## 2019-01-31 PROCEDURE — 63600175 PHARM REV CODE 636 W HCPCS: Performed by: STUDENT IN AN ORGANIZED HEALTH CARE EDUCATION/TRAINING PROGRAM

## 2019-01-31 PROCEDURE — 84134 ASSAY OF PREALBUMIN: CPT

## 2019-01-31 PROCEDURE — 20000000 HC ICU ROOM

## 2019-01-31 PROCEDURE — P9035 PLATELET PHERES LEUKOREDUCED: HCPCS

## 2019-01-31 PROCEDURE — 84100 ASSAY OF PHOSPHORUS: CPT | Mod: 91

## 2019-01-31 PROCEDURE — 94664 DEMO&/EVAL PT USE INHALER: CPT

## 2019-01-31 PROCEDURE — 27000646 HC AEROBIKA DEVICE

## 2019-01-31 PROCEDURE — 82330 ASSAY OF CALCIUM: CPT

## 2019-01-31 PROCEDURE — 84478 ASSAY OF TRIGLYCERIDES: CPT

## 2019-01-31 PROCEDURE — 80202 ASSAY OF VANCOMYCIN: CPT | Mod: 91

## 2019-01-31 PROCEDURE — 94668 MNPJ CHEST WALL SBSQ: CPT

## 2019-01-31 PROCEDURE — 63600175 PHARM REV CODE 636 W HCPCS: Performed by: SURGERY

## 2019-01-31 PROCEDURE — 80053 COMPREHEN METABOLIC PANEL: CPT | Mod: 91

## 2019-01-31 PROCEDURE — 99232 SBSQ HOSP IP/OBS MODERATE 35: CPT | Mod: 25,,, | Performed by: ANESTHESIOLOGY

## 2019-01-31 PROCEDURE — 63600175 PHARM REV CODE 636 W HCPCS: Performed by: PHYSICIAN ASSISTANT

## 2019-01-31 PROCEDURE — 99232 SBSQ HOSP IP/OBS MODERATE 35: CPT | Mod: ,,, | Performed by: NURSE PRACTITIONER

## 2019-01-31 PROCEDURE — 99232 PR SUBSEQUENT HOSPITAL CARE,LEVL II: ICD-10-PCS | Mod: ,,, | Performed by: NURSE PRACTITIONER

## 2019-01-31 PROCEDURE — C9113 INJ PANTOPRAZOLE SODIUM, VIA: HCPCS | Performed by: PHYSICIAN ASSISTANT

## 2019-01-31 PROCEDURE — S0030 INJECTION, METRONIDAZOLE: HCPCS | Performed by: SURGERY

## 2019-01-31 PROCEDURE — 92526 ORAL FUNCTION THERAPY: CPT

## 2019-01-31 PROCEDURE — 99232 PR SUBSEQUENT HOSPITAL CARE,LEVL II: ICD-10-PCS | Mod: 25,,, | Performed by: ANESTHESIOLOGY

## 2019-01-31 PROCEDURE — 27000221 HC OXYGEN, UP TO 24 HOURS

## 2019-01-31 PROCEDURE — 94640 AIRWAY INHALATION TREATMENT: CPT

## 2019-01-31 PROCEDURE — 25000003 PHARM REV CODE 250: Performed by: SURGERY

## 2019-01-31 PROCEDURE — 85025 COMPLETE CBC W/AUTO DIFF WBC: CPT | Mod: 91

## 2019-01-31 PROCEDURE — B4185 PARENTERAL SOL 10 GM LIPIDS: HCPCS | Performed by: STUDENT IN AN ORGANIZED HEALTH CARE EDUCATION/TRAINING PROGRAM

## 2019-01-31 PROCEDURE — 94761 N-INVAS EAR/PLS OXIMETRY MLT: CPT

## 2019-01-31 PROCEDURE — 80202 ASSAY OF VANCOMYCIN: CPT

## 2019-01-31 PROCEDURE — 25000242 PHARM REV CODE 250 ALT 637 W/ HCPCS: Performed by: SURGERY

## 2019-01-31 PROCEDURE — A4217 STERILE WATER/SALINE, 500 ML: HCPCS | Performed by: STUDENT IN AN ORGANIZED HEALTH CARE EDUCATION/TRAINING PROGRAM

## 2019-01-31 PROCEDURE — 99900035 HC TECH TIME PER 15 MIN (STAT)

## 2019-01-31 PROCEDURE — 83735 ASSAY OF MAGNESIUM: CPT

## 2019-01-31 RX ORDER — GLUCAGON 1 MG
1 KIT INJECTION
Status: DISCONTINUED | OUTPATIENT
Start: 2019-01-31 | End: 2019-02-03

## 2019-01-31 RX ORDER — INSULIN ASPART 100 [IU]/ML
0-10 INJECTION, SOLUTION INTRAVENOUS; SUBCUTANEOUS
Status: DISCONTINUED | OUTPATIENT
Start: 2019-01-31 | End: 2019-02-03

## 2019-01-31 RX ORDER — POTASSIUM CHLORIDE 14.9 MG/ML
20 INJECTION INTRAVENOUS
Status: DISPENSED | OUTPATIENT
Start: 2019-01-31 | End: 2019-02-01

## 2019-01-31 RX ORDER — POTASSIUM CHLORIDE 14.9 MG/ML
20 INJECTION INTRAVENOUS
Status: COMPLETED | OUTPATIENT
Start: 2019-01-31 | End: 2019-01-31

## 2019-01-31 RX ORDER — HYDROCODONE BITARTRATE AND ACETAMINOPHEN 500; 5 MG/1; MG/1
TABLET ORAL
Status: DISCONTINUED | OUTPATIENT
Start: 2019-01-31 | End: 2019-02-07

## 2019-01-31 RX ORDER — FUROSEMIDE 10 MG/ML
40 INJECTION INTRAMUSCULAR; INTRAVENOUS 2 TIMES DAILY
Status: DISCONTINUED | OUTPATIENT
Start: 2019-01-31 | End: 2019-02-04

## 2019-01-31 RX ORDER — IBUPROFEN 200 MG
16 TABLET ORAL
Status: DISCONTINUED | OUTPATIENT
Start: 2019-01-31 | End: 2019-02-03

## 2019-01-31 RX ORDER — IBUPROFEN 200 MG
24 TABLET ORAL
Status: DISCONTINUED | OUTPATIENT
Start: 2019-01-31 | End: 2019-02-03

## 2019-01-31 RX ADMIN — POTASSIUM CHLORIDE 20 MEQ: 200 INJECTION, SOLUTION INTRAVENOUS at 01:01

## 2019-01-31 RX ADMIN — FLUTICASONE FUROATE AND VILANTEROL TRIFENATATE 1 PUFF: 100; 25 POWDER RESPIRATORY (INHALATION) at 08:01

## 2019-01-31 RX ADMIN — PANTOPRAZOLE SODIUM 40 MG: 40 INJECTION, POWDER, LYOPHILIZED, FOR SOLUTION INTRAVENOUS at 08:01

## 2019-01-31 RX ADMIN — METRONIDAZOLE 500 MG: 500 INJECTION, SOLUTION INTRAVENOUS at 10:01

## 2019-01-31 RX ADMIN — PANTOPRAZOLE SODIUM 40 MG: 40 INJECTION, POWDER, LYOPHILIZED, FOR SOLUTION INTRAVENOUS at 10:01

## 2019-01-31 RX ADMIN — FUROSEMIDE 40 MG: 10 INJECTION, SOLUTION INTRAVENOUS at 08:01

## 2019-01-31 RX ADMIN — METRONIDAZOLE 500 MG: 500 INJECTION, SOLUTION INTRAVENOUS at 05:01

## 2019-01-31 RX ADMIN — ALBUTEROL SULFATE 2.5 MG: 2.5 SOLUTION RESPIRATORY (INHALATION) at 05:01

## 2019-01-31 RX ADMIN — HYDROMORPHONE HYDROCHLORIDE 0.5 MG: 1 INJECTION, SOLUTION INTRAMUSCULAR; INTRAVENOUS; SUBCUTANEOUS at 10:01

## 2019-01-31 RX ADMIN — CALCIUM GLUCONATE 1000 MG: 98 INJECTION, SOLUTION INTRAVENOUS at 07:01

## 2019-01-31 RX ADMIN — HYDROMORPHONE HYDROCHLORIDE 0.5 MG: 1 INJECTION, SOLUTION INTRAMUSCULAR; INTRAVENOUS; SUBCUTANEOUS at 07:01

## 2019-01-31 RX ADMIN — POTASSIUM CHLORIDE 20 MEQ: 200 INJECTION, SOLUTION INTRAVENOUS at 10:01

## 2019-01-31 RX ADMIN — CIPROFLOXACIN 400 MG: 2 INJECTION, SOLUTION INTRAVENOUS at 10:01

## 2019-01-31 RX ADMIN — VANCOMYCIN HYDROCHLORIDE 750 MG: 750 INJECTION, POWDER, LYOPHILIZED, FOR SOLUTION INTRAVENOUS at 11:01

## 2019-01-31 RX ADMIN — METRONIDAZOLE 500 MG: 500 INJECTION, SOLUTION INTRAVENOUS at 01:01

## 2019-01-31 RX ADMIN — HYDROCORTISONE SODIUM SUCCINATE 25 MG: 100 INJECTION, POWDER, FOR SOLUTION INTRAMUSCULAR; INTRAVENOUS at 10:01

## 2019-01-31 RX ADMIN — SODIUM CHLORIDE 1.4 UNITS/HR: 9 INJECTION, SOLUTION INTRAVENOUS at 10:01

## 2019-01-31 RX ADMIN — SOYBEAN OIL 250 ML: 20 INJECTION, SOLUTION INTRAVENOUS at 10:01

## 2019-01-31 RX ADMIN — IPRATROPIUM BROMIDE AND ALBUTEROL SULFATE 3 ML: .5; 3 SOLUTION RESPIRATORY (INHALATION) at 01:01

## 2019-01-31 RX ADMIN — FUROSEMIDE 40 MG: 10 INJECTION, SOLUTION INTRAVENOUS at 05:01

## 2019-01-31 RX ADMIN — POTASSIUM CHLORIDE 20 MEQ: 200 INJECTION, SOLUTION INTRAVENOUS at 03:01

## 2019-01-31 RX ADMIN — CIPROFLOXACIN 400 MG: 2 INJECTION, SOLUTION INTRAVENOUS at 09:01

## 2019-01-31 RX ADMIN — IPRATROPIUM BROMIDE AND ALBUTEROL SULFATE 3 ML: .5; 3 SOLUTION RESPIRATORY (INHALATION) at 08:01

## 2019-01-31 RX ADMIN — CALCIUM GLUCONATE: 94 INJECTION, SOLUTION INTRAVENOUS at 10:01

## 2019-01-31 RX ADMIN — HYDROMORPHONE HYDROCHLORIDE 0.5 MG: 1 INJECTION, SOLUTION INTRAMUSCULAR; INTRAVENOUS; SUBCUTANEOUS at 02:01

## 2019-01-31 RX ADMIN — HYDROCORTISONE SODIUM SUCCINATE 25 MG: 100 INJECTION, POWDER, FOR SOLUTION INTRAMUSCULAR; INTRAVENOUS at 08:01

## 2019-01-31 RX ADMIN — HYDROMORPHONE HYDROCHLORIDE 0.5 MG: 1 INJECTION, SOLUTION INTRAMUSCULAR; INTRAVENOUS; SUBCUTANEOUS at 01:01

## 2019-01-31 RX ADMIN — HYDROMORPHONE HYDROCHLORIDE 0.5 MG: 1 INJECTION, SOLUTION INTRAMUSCULAR; INTRAVENOUS; SUBCUTANEOUS at 03:01

## 2019-01-31 NOTE — ASSESSMENT & PLAN NOTE
64 yo W with a history of HTN, COPD on home oxygen, HFpEF, IDDMII, CVA on plavix, HTN,  Appendectomy (lap converted to open 8/18) and PE who presents to the ED with an abdominal abscess 1/25/18 s/p ex lap and washout. Extubated 1/29/19. Complained of mouth pain, found to have fullness of hard palate, Otolaryngology consulted. This is a palatine huyen - normal variant of palatal anatomy.  Her discomfort is likely from the fact that she was intubated for 4 days and the huyen is irritated from the ETT (either that or related to the act of intubation).  Small area of irritation (likely from above), this will heal by self, may consider mouth rinses.    -No intervention

## 2019-01-31 NOTE — PLAN OF CARE
Problem: Adult Inpatient Plan of Care  Goal: Patient-Specific Goal (Individualization)  Outcome: Ongoing (interventions implemented as appropriate)  Patient AAOx4.  VSS.  Has remained afebrile this shift.  Neuro checks completed hourly, patient's speech continues to slur (not a new finding).  POC reviewed with patient and daughter.  Open discussion was facilitated.  Patient verbalized understanding.  Bed in lowest position, side rails up x2, call light within reach, and safety measures maintained throughout shift.  Has remained free of falls, trauma, and injury this shift.  Patient denies any needs at this time.  Will continue to monitor.

## 2019-01-31 NOTE — PLAN OF CARE
Problem: Adult Inpatient Plan of Care  Goal: Plan of Care Review  PMH: HTN, COPD, HF, DMII, CVA (2012; R-side weakness residual); hip fx; FTT; PE    DX: s/p ex-lap I&D R hemipelvis fluid collection 1/25 post-op hypotension/acidosis requiring fluid rescus.     1/26: SCUF overnight, electrolyte repletion, trending lactate. (9.6->6.9->6.2->6.0); restart TPN;   1/28: extubated; insulin gtt off; Vaso off; D10% gtt started; HIT panel sent        Nursing:   MAP >65    Q1 BG        Outcome: Ongoing (interventions implemented as appropriate)  Pt noted to have a left facial droop along with garbled speech. Stroke code called and CT done per order. Pt r/o for stroke at this time. Pt noted to have multiple sites for broken skin including: under the breast, in the groin folds, abd, labia, and sacrum. Wound care was notified and came to bedside for recommendations that are noted. New bed ordered for the patient, but has not arrived. Pt has been diuresed today with little response. Pt daughters have been to bedside today. Pt and family have been updated on plan of care all questions answered.

## 2019-01-31 NOTE — SUBJECTIVE & OBJECTIVE
Interval History/Significant Events: NAEON. Stroke code called yesterday for garbled speech; workup including head CT negative. Also multiple sites of skin breakdown across body noted by nursing; wound care done and new bed ordered. Continues on TPN with insulin gtt.    Follow-up For: Procedure(s) (LRB):  LAPAROTOMY, EXPLORATORY (N/A)  INCISION AND DRAINAGE, ABSCESS-  drainage of pelvic abscess (N/A)  EXCISION, ABSCESS- drainage abdominal wall abscess (N/A)  APPLICATION, WOUND VAC (N/A)    Post-Operative Day: 6 Days Post-Op    Objective:     Vital Signs (Most Recent):  Temp: 97.6 °F (36.4 °C) (01/31/19 0705)  Pulse: 105 (01/31/19 0705)  Resp: 19 (01/31/19 0705)  BP: (!) 147/86 (01/31/19 0705)  SpO2: 100 % (01/31/19 0705) Vital Signs (24h Range):  Temp:  [97.6 °F (36.4 °C)-98.2 °F (36.8 °C)] 97.6 °F (36.4 °C)  Pulse:  [] 105  Resp:  [6-28] 19  SpO2:  [94 %-100 %] 100 %  BP: (123-169)/() 147/86     Weight: 71.2 kg (157 lb)  Body mass index is 29.66 kg/m².      Intake/Output Summary (Last 24 hours) at 1/31/2019 0730  Last data filed at 1/31/2019 0600  Gross per 24 hour   Intake 2413.05 ml   Output 3865 ml   Net -1451.95 ml       Physical Exam   Constitutional: She appears well-developed and well-nourished. No distress.   HENT:   Head: Normocephalic.   Eyes: Pupils are equal, round, and reactive to light.   Cardiovascular: Regular rhythm.   Tachycardic   Pulmonary/Chest: No respiratory distress. She has no wheezes.   Extubated on 2L NC.   Abdominal: Soft. She exhibits no distension.   Neurological:   lethargic   Skin: Skin is warm and dry.       Vents: Not on ventilator.      Lines/Drains/Airways     Central Venous Catheter Line                 Port A Cath Single Lumen 01/23/19 1400 right subclavian 7 days         Percutaneous Central Line Insertion/Assessment - triple lumen  01/26/19 0010 left internal jugular 5 days         Trialysis (Dialysis) Catheter 01/26/19 1200 right internal jugular 4 days           Drain                 Closed/Suction Drain 01/25/19 1020 Right;Ventral Abdomen Bulb 19 Fr. 5 days         Urethral Catheter 01/25/19 0934 Straight-tip;Non-latex 16 Fr. 5 days          Pressure Ulcer                 Negative Pressure Wound Therapy  5 days                Significant Labs:    CBC/Anemia Profile:  Recent Labs   Lab 01/30/19  1651 01/30/19  2200 01/31/19  0645   WBC 9.14 8.54 8.88   HGB 7.2* 6.8* 7.0*   HCT 20.6* 20.0* 20.5*   PLT 26* 31* 43*   MCV 78* 78* 79*   RDW 19.0* 18.8* 19.1*        Chemistries:  Recent Labs   Lab 01/30/19  0909 01/30/19  1200 01/30/19  1651 01/30/19  2345   *  --  132* 134*   K 5.0 4.7 4.5 3.9     --  100 100   CO2 27  --  26 26   BUN 10  --  15 17   CREATININE 0.7  --  0.8 0.7   CALCIUM 7.7*  --  7.5* 7.5*   ALBUMIN 1.8*  --  1.6* 1.6*   PROT 4.0*  --  3.8* 3.9*   BILITOT 3.9*  --  3.7* 4.0*   ALKPHOS 376*  --  380* 779*   ALT 50*  --  47* 51*   AST 58*  --  47* 67*   MG 2.4  --  2.3 2.2   PHOS 3.5  --  3.8 3.6       All pertinent labs within the past 24 hours have been reviewed.    Significant Imaging:  I have reviewed all pertinent imaging results/findings within the past 24 hours.

## 2019-01-31 NOTE — PROGRESS NOTES
[facility]  Critical Care - Surgery  Progress Note    Patient Name: Sheryl Martines  MRN: [id number]  Admission Date: 1/23/2019  Hospital Length of Stay: 7 days  Code Status: Full Code  Attending Provider: Monster Laurent MD  Primary Care Provider: Primary Doctor No   Principal Problem: Pelvic abscess in female    Subjective:     Hospital/ICU Course:  No notes on file    Interval History/Significant Events: NAEON. Stroke code called yesterday for garbled speech; workup including head CT negative. Also multiple sites of skin breakdown across body noted by nursing; wound care done and new bed ordered. Continues on TPN with insulin gtt.    Follow-up For: Procedure(s) (LRB):  LAPAROTOMY, EXPLORATORY (N/A)  INCISION AND DRAINAGE, ABSCESS-  drainage of pelvic abscess (N/A)  EXCISION, ABSCESS- drainage abdominal wall abscess (N/A)  APPLICATION, WOUND VAC (N/A)    Post-Operative Day: 6 Days Post-Op    Objective:     Vital Signs (Most Recent):  Temp: 97.6 °F (36.4 °C) (01/31/19 0705)  Pulse: 105 (01/31/19 0705)  Resp: 19 (01/31/19 0705)  BP: (!) 147/86 (01/31/19 0705)  SpO2: 100 % (01/31/19 0705) Vital Signs (24h Range):  Temp:  [97.6 °F (36.4 °C)-98.2 °F (36.8 °C)] 97.6 °F (36.4 °C)  Pulse:  [] 105  Resp:  [6-28] 19  SpO2:  [94 %-100 %] 100 %  BP: (123-169)/() 147/86     Weight: 71.2 kg (157 lb)  Body mass index is 29.66 kg/m².      Intake/Output Summary (Last 24 hours) at 1/31/2019 0730  Last data filed at 1/31/2019 0600  Gross per 24 hour   Intake 2413.05 ml   Output 3865 ml   Net -1451.95 ml       Physical Exam   Constitutional: She appears well-developed and well-nourished. No distress.   HENT:   Head: Normocephalic.   Eyes: Pupils are equal, round, and reactive to light.   Cardiovascular: Regular rhythm.   Tachycardic   Pulmonary/Chest: No respiratory distress. She has no wheezes.   Extubated on 2L NC.   Abdominal: Soft. She exhibits no distension.   Neurological:   lethargic   Skin:  Skin is warm and dry.       Vents: Not on ventilator.      Lines/Drains/Airways     Central Venous Catheter Line                 Port A Cath Single Lumen 01/23/19 1400 right subclavian 7 days         Percutaneous Central Line Insertion/Assessment - triple lumen  01/26/19 0010 left internal jugular 5 days         Trialysis (Dialysis) Catheter 01/26/19 1200 right internal jugular 4 days          Drain                 Closed/Suction Drain 01/25/19 1020 Right;Ventral Abdomen Bulb 19 Fr. 5 days         Urethral Catheter 01/25/19 0934 Straight-tip;Non-latex 16 Fr. 5 days          Pressure Ulcer                 Negative Pressure Wound Therapy  5 days                Significant Labs:    CBC/Anemia Profile:  Recent Labs   Lab 01/30/19  1651 01/30/19  2200 01/31/19  0645   WBC 9.14 8.54 8.88   HGB 7.2* 6.8* 7.0*   HCT 20.6* 20.0* 20.5*   PLT 26* 31* 43*   MCV 78* 78* 79*   RDW 19.0* 18.8* 19.1*        Chemistries:  Recent Labs   Lab 01/30/19  0909 01/30/19  1200 01/30/19  1651 01/30/19  2345   *  --  132* 134*   K 5.0 4.7 4.5 3.9     --  100 100   CO2 27  --  26 26   BUN 10  --  15 17   CREATININE 0.7  --  0.8 0.7   CALCIUM 7.7*  --  7.5* 7.5*   ALBUMIN 1.8*  --  1.6* 1.6*   PROT 4.0*  --  3.8* 3.9*   BILITOT 3.9*  --  3.7* 4.0*   ALKPHOS 376*  --  380* 779*   ALT 50*  --  47* 51*   AST 58*  --  47* 67*   MG 2.4  --  2.3 2.2   PHOS 3.5  --  3.8 3.6       All pertinent labs within the past 24 hours have been reviewed.    Significant Imaging:  I have reviewed all pertinent imaging results/findings within the past 24 hours.    Assessment/Plan:     * Pelvic abscess in female    63 y.o. female with hx of asthma, COPD, CAD, DM, HTN, CVA 2012 with R side deficits, HFpEF, severe debility, lap converted to open appendectomy 8/2018 complicated by cdiff, failure to thrive.  Hx of difficult intubation and delayed emergence, CO2 narcosis needing ICU stay.  Hx of PRN O2 at night. Patient now s/p exploratory laparotomy, washout,  and pelvic abscess drainage on 1/25/19.    Post-operatively, on initial evaluation in PACU, patient hypotensive to 60s/40s, on BiPap. Currently fluid resuscitating with 3rd liter of LR, responding well to phenylephrine, starting phenylephrine infusion (tachycardic to ~118). ABG 7.11/49/116/16, BE -14, on bipap 14/5, 35% FiO2. Intubated on 1/26 for airway protection. Required CRRT on 1/26 for metabolic acidosis, now off.     Neuro:  Sedation: None  Pain: dilaudid 0.5mg PRN     CV:  - HDS  - Off pressors  - Lactate 3.4 --> 2.3 --> 2.6->2.2->1.8->2.1    Pulm:   - Extubated 1/28/19  - on RA  - CXR from 1/30/19 showing moderate-severe edema pleural fluid; no interval change    Renal:  Trend BUN/Cr: 17/0.7  Monitor Urine output   Nephro following, appreciate assistance    ID:  AF  1/24 blood cultures: gram positive cocci in clusters resembling staph  1/25 blood cultures: NGTD  1/25 operative cultures pending, ngtd thus far  - on cipro/metro/vanc      FEN/GI:  TPN @ 75  Replace lytes PRN  Last lactate 2.1; no longer trending      Endo:  Patient with IDDM2, on detemir 20u BID at home  1/27-1/28 with hyperglycemia into the 500s on insulin gtt @ 100u/hr  1/28-1/29 with hypoglycemia requiring d10 infusion @ 125cc/hr  With introduction of TPN has now come off of D10 with glucose levels stable; continues on insulin gtt    Heme:  Trend H&H : was 6.8 overnight but up to 7.0 this am; will hold on giving blood for now but continue to trend  Post-op hgb 8.5   Plt up to 43 on am labs, holding heparin  HIT panel negative  No signs of bleeding at this time    Dispo: Cont ICU care   Primary: SICU/general surgery            Critical care was time spent personally by me on the following activities: development of treatment plan with patient or surrogate and bedside caregivers, discussions with consultants, evaluation of patient's response to treatment, examination of patient, ordering and performing treatments and interventions, ordering  and review of laboratory studies, ordering and review of radiographic studies, pulse oximetry, re-evaluation of patient's condition.  This critical care time did not overlap with that of any other provider or involve time for any procedures.     Chyna Ott MD  Critical Care - Surgery  [facility]

## 2019-01-31 NOTE — PLAN OF CARE
Problem: Adult Inpatient Plan of Care  Goal: Plan of Care Review  PMH: HTN, COPD, HF, DMII, CVA (2012; R-side weakness residual); hip fx; FTT; PE    DX: s/p ex-lap I&D R hemipelvis fluid collection 1/25 post-op hypotension/acidosis requiring fluid rescus.     1/26: SCUF overnight, electrolyte repletion, trending lactate. (9.6->6.9->6.2->6.0); restart TPN;   1/28: extubated; insulin gtt off; Vaso off; D10% gtt started; HIT panel sent        Nursing:   MAP >65    Q1 BG        Outcome: Ongoing (interventions implemented as appropriate)  Plan of care reviewed with patient and family. All questions answered. VSS throughout shift. CVPs 6-7. Wound vac removed by wound care nurse today. Received 1 unit of plts this am. Ritchie intact and making adequate UOP. Will continue to monitor.

## 2019-01-31 NOTE — ASSESSMENT & PLAN NOTE
64 yo female presenting with abdominal pain, nausea, elevated lactate s/p open drainage pelvic abscess 1/25     -cont broad spec abx, f/u cultures- abdominal with bacteroides, blood cx from admit MRSA x2, cont cipro/flagyl   -npo for now  -wocn following, appreciate help with wounds/blisters, plan to remove incisional wound vac today  -cont ICU care

## 2019-01-31 NOTE — PLAN OF CARE
Problem: SLP Goal  Goal: SLP Goal  Speech Language Pathology Goals  Goals expected to be met by 2/6/19  1. Pt will participate in ongoing swallow assessment  2. Educate Pt and family on S/S aspiration and aspiration precautions         Outcome: Ongoing (interventions implemented as appropriate)  SLP attempted to see Patient for ongoing swallow assessment. Pt with decreased lingual strength and mobility with palatal swelling.  Productive cough noted with secretions. Pt not appropriate for PO intake. Nurse and MD notified of concerns for palatal swelling/abscess, NPO precautions, and possible consult for oral surgeon/dentist.  ST to continue to monitor to determine appropriateness for further swallow assessment when feasible pending status.      NAVID Cobb., [facility]-SL[facility]  Speech-Language Pathology  Pager: [phone]  1/31/2019

## 2019-01-31 NOTE — ASSESSMENT & PLAN NOTE
63 y.o. female with hx of asthma, COPD, CAD, DM, HTN, CVA 2012 with R side deficits, HFpEF, severe debility, lap converted to open appendectomy 8/2018 complicated by cdiff, failure to thrive.  Hx of difficult intubation and delayed emergence, CO2 narcosis needing ICU stay.  Hx of PRN O2 at night. Patient now s/p exploratory laparotomy, washout, and pelvic abscess drainage on 1/25/19.    Post-operatively, on initial evaluation in PACU, patient hypotensive to 60s/40s, on BiPap. Currently fluid resuscitating with 3rd liter of LR, responding well to phenylephrine, starting phenylephrine infusion (tachycardic to ~118). ABG 7.11/49/116/16, BE -14, on bipap 14/5, 35% FiO2. Intubated on 1/26 for airway protection. Required CRRT on 1/26 for metabolic acidosis, now off.     Neuro:  Sedation: None  Pain: dilaudid 0.5mg PRN     CV:  - HDS  - Off pressors  - Lactate 3.4 --> 2.3 --> 2.6->2.2->1.8->2.1    Pulm:   - Extubated 1/28/19  - on RA  - CXR from 1/30/19 showing moderate-severe edema pleural fluid; no interval change    Renal:  Trend BUN/Cr: 17/0.7  Monitor Urine output   Nephro following, appreciate assistance    ID:  AF  1/24 blood cultures: gram positive cocci in clusters resembling staph  1/25 blood cultures: NGTD  1/25 operative cultures pending, ngtd thus far  - on cipro/metro/vanc      FEN/GI:  TPN @ 75  Replace lytes PRN  Last lactate 2.1; no longer trending      Endo:  Patient with IDDM2, on detemir 20u BID at home  1/27-1/28 with hyperglycemia into the 500s on insulin gtt @ 100u/hr  1/28-1/29 with hypoglycemia requiring d10 infusion @ 125cc/hr  With introduction of TPN has now come off of D10 with glucose levels stable; continues on insulin gtt    Heme:  Trend H&H : was 6.8 overnight but up to 7.0 this am; will hold on giving blood for now but continue to trend  Post-op hgb 8.5   Plt up to 43 on am labs, holding heparin, HIT panel sent  No signs of bleeding at this time    Dispo: Cont ICU care   Primary:  SICU/general surgery

## 2019-01-31 NOTE — SUBJECTIVE & OBJECTIVE
Interval History: HDS o/n. Good UOP 3300 ml/h24h with 40 lasix x2. Serous drainage from JPs. Seen by speech, blister on hard palate, NPO for now. Afebrile.     Medications:  Continuous Infusions:   insulin (HUMAN R) infusion (adults) 1.4 Units/hr (01/31/19 0800)    TPN ADULT CENTRAL LINE CUSTOM 60 mL/hr at 01/31/19 0800    TPN ADULT CENTRAL LINE CUSTOM       Scheduled Meds:   albuterol-ipratropium  3 mL Nebulization Q6H    ciprofloxacin  400 mg Intravenous Q12H    fat emulsion 20%  250 mL Intravenous Daily    fat emulsion 20%  250 mL Intravenous Daily    fluticasone-vilanterol  1 puff Inhalation Daily    furosemide  40 mg Intravenous BID    hydrocortisone sodium succinate  25 mg Intravenous BID    metronidazole  500 mg Intravenous Q8H    pantoprozole (PROTONIX) IV  40 mg Intravenous Q12H    vancomycin (VANCOCIN) IVPB  750 mg Intravenous Q24H     PRN Meds:sodium chloride, albuterol sulfate, dextrose 50%, dextrose 50%, diclofenac sodium, diphenhydrAMINE, glucagon (human recombinant), HYDROmorphone, ondansetron, promethazine (PHENERGAN) IVPB, sodium chloride 0.9%, sodium chloride 0.9%, white petrolatum     Review of patient's allergies indicates:   Allergen Reactions    Ace inhibitors Swelling    Carvedilol      Other reaction(s): Other (See Comments)  blister    Hydralazine analogues     Metformin Nausea And Vomiting    Tetracycline Itching    Tetracyclines Swelling    Travatan (with benzalkonium) [travoprost (benzalkonium)]     Travoprost Itching     Other reaction(s): Other (See Comments)  Blurry vision     Objective:     Vital Signs (Most Recent):  Temp: 98 °F (36.7 °C) (01/31/19 0815)  Pulse: 99 (01/31/19 0830)  Resp: 19 (01/31/19 0830)  BP: (!) 143/82 (01/31/19 0815)  SpO2: 100 % (01/31/19 0830) Vital Signs (24h Range):  Temp:  [97.6 °F (36.4 °C)-98.2 °F (36.8 °C)] 98 °F (36.7 °C)  Pulse:  [] 99  Resp:  [6-28] 19  SpO2:  [94 %-100 %] 100 %  BP: (123-161)/() 143/82     Weight: 71.2  kg (157 lb)  Body mass index is 29.66 kg/m².    Intake/Output - Last 3 Shifts       01/29 0700 - 01/30 0659 01/30 0700 - 01/31 0659 01/31 0700 - 02/01 0659    I.V. (mL/kg) 3184.2 (44.7) 65.5 (0.9)     NG/GT       IV Piggyback 1050 400     .5 1947.5     Total Intake(mL/kg) 5011.7 (70.4) 2413.1 (33.9)     Urine (mL/kg/hr) 3185 (1.9) 3270 (1.9) 95 (0.7)    Drains 460 490 22    Other 125 225 0    Stool  0     Total Output 3770 3985 117    Net +1241.7 -1572 -117           Stool Occurrence  0 x           Physical Exam   Constitutional: No distress.   HENT:   Head: Normocephalic and atraumatic.   Pulmonary/Chest: Effort normal. No respiratory distress.   Abdominal:   Abdomen soft, midline incision with prevena vac, pelvic drain serous  RLQ former incision abscess site clean  Area RLQ above incision with denuded skin (prev blister)   Neurological: She is alert.   Skin: Skin is warm and dry. She is not diaphoretic.   significant edema extremities    Significant Labs:  CBC:   Recent Labs   Lab 01/31/19  0645   WBC 8.88   RBC 2.61*   HGB 7.0*   HCT 20.5*   PLT 43*   MCV 79*   MCH 26.8*   MCHC 34.1     BMP:   Recent Labs   Lab 01/30/19  2345   *   *   K 3.9      CO2 26   BUN 17   CREATININE 0.7   CALCIUM 7.5*   MG 2.2     ABGs:   Recent Labs   Lab 01/28/19 2012   PH 7.296*   PCO2 50.8*   PO2 43   HCO3 24.8   POCSATURATED 73*   BE -2

## 2019-01-31 NOTE — SUBJECTIVE & OBJECTIVE
"Interval HPI:   Overnight events: Remains in ICU. NAEON. BG at or slightly above goal on insulin infusion rates 1.4-2.5 u/hr. Hydrocortisone tapered to 25 mg BID.   Eating:   NPO  Nausea: No  Hypoglycemia and intervention: No  Fever: No  TPN and/or TF: No    BP (!) 143/82   Pulse 99   Temp 98 °F (36.7 °C) (Oral)   Resp 19   Ht 5' 1" (1.549 m)   Wt 71.2 kg (157 lb)   LMP  (LMP Unknown)   SpO2 100%   Breastfeeding? No   BMI 29.66 kg/m²     Labs Reviewed and Include    Recent Labs   Lab 01/30/19  2345   *   CALCIUM 7.5*   ALBUMIN 1.6*   PROT 3.9*   *   K 3.9   CO2 26      BUN 17   CREATININE 0.7   ALKPHOS 779*   ALT 51*   AST 67*   BILITOT 4.0*     Lab Results   Component Value Date    WBC 8.88 01/31/2019    HGB 7.0 (L) 01/31/2019    HCT 20.5 (L) 01/31/2019    MCV 79 (L) 01/31/2019    PLT 43 (L) 01/31/2019     No results for input(s): TSH, FREET4 in the last 168 hours.  Lab Results   Component Value Date    HGBA1C 4.7 01/24/2019       Nutritional status:   Body mass index is 29.66 kg/m².  Lab Results   Component Value Date    ALBUMIN 1.6 (L) 01/30/2019    ALBUMIN 1.6 (L) 01/30/2019    ALBUMIN 1.8 (L) 01/30/2019     Lab Results   Component Value Date    PREALBUMIN 6 (L) 01/31/2019    PREALBUMIN 3 (L) 01/28/2019    PREALBUMIN 5 (L) 01/24/2019       Estimated Creatinine Clearance: 74.3 mL/min (based on SCr of 0.7 mg/dL).    Accu-Checks  Recent Labs     01/30/19  1754 01/30/19  2025 01/30/19  2205 01/30/19  2259 01/30/19  2345 01/31/19  0208 01/31/19  0418 01/31/19  0549 01/31/19  0643 01/31/19  0721   POCTGLUCOSE 252* 262* 330* 253* 243* 223* 204* 193* 165* 143*       Current Medications and/or Treatments Impacting Glycemic Control  Immunotherapy:    Immunosuppressants     None        Steroids:   Hormones (From admission, onward)    Start     Stop Route Frequency Ordered    01/31/19 0900  hydrocortisone sodium succinate injection 25 mg      -- IV 2 times daily 01/31/19 0743        Pressors:  "   Autonomic Drugs (From admission, onward)    None        Hyperglycemia/Diabetes Medications:   Antihyperglycemics (From admission, onward)    Start     Stop Route Frequency Ordered    01/30/19 1022  insulin regular (Humulin R) 100 Units in sodium chloride 0.9% 100 mL infusion     Question:  Insulin Rate Adjustment (DO NOT MODIFY ANSWER)  Answer:  [email or web address]    -- IV Continuous 01/30/19 1023

## 2019-01-31 NOTE — SUBJECTIVE & OBJECTIVE
Medications:  Continuous Infusions:   insulin (HUMAN R) infusion (adults) 1.4 Units/hr (01/31/19 1300)    TPN ADULT CENTRAL LINE CUSTOM 60 mL/hr at 01/31/19 1300    TPN ADULT CENTRAL LINE CUSTOM       Scheduled Meds:   albuterol-ipratropium  3 mL Nebulization Q6H    ciprofloxacin  400 mg Intravenous Q12H    fat emulsion 20%  250 mL Intravenous Daily    fluticasone-vilanterol  1 puff Inhalation Daily    furosemide  40 mg Intravenous BID    hydrocortisone sodium succinate  25 mg Intravenous BID    metronidazole  500 mg Intravenous Q8H    pantoprozole (PROTONIX) IV  40 mg Intravenous Q12H    potassium chloride  20 mEq Intravenous Q2H    vancomycin (VANCOCIN) IVPB  750 mg Intravenous Q24H     PRN Meds:sodium chloride, albuterol sulfate, dextrose 50%, dextrose 50%, diclofenac sodium, diphenhydrAMINE, glucagon (human recombinant), glucose, glucose, HYDROmorphone, insulin aspart U-100, ondansetron, promethazine (PHENERGAN) IVPB, sodium chloride 0.9%, sodium chloride 0.9%, white petrolatum     No current facility-administered medications on file prior to encounter.      Current Outpatient Medications on File Prior to Encounter   Medication Sig    acetaminophen (TYLENOL) 500 MG tablet Take 1,000 mg by mouth 2 (two) times daily as needed for Pain.    albuterol (PROVENTIL/VENTOLIN HFA) 90 mcg/actuation inhaler Inhale 2 puffs into the lungs every 6 (six) hours as needed for Wheezing or Shortness of Breath. Rescue    albuterol-ipratropium (DUO-NEB) 2.5 mg-0.5 mg/3 mL nebulizer solution Take 3 mLs by nebulization every 4 (four) hours as needed for Wheezing or Shortness of Breath. Rescue     aspirin (ECOTRIN) 81 MG EC tablet Take 1 tablet (81 mg total) by mouth once daily.    baclofen (LIORESAL) 10 MG tablet Take 10 mg by mouth 2 (two) times daily as needed (MUSCLE SPASMS).    cefdinir (OMNICEF) 300 MG capsule TK ONE C PO  BID FOR 7 DAYS    cephALEXin (KEFLEX) 750 MG capsule TK ONE C PO TID FOR 5 DAYS     ciprofloxacin HCl (CIPRO) 250 MG tablet Take 1 tablet (250 mg total) by mouth every 12 (twelve) hours. Starting 12/15 evening    clopidogrel (PLAVIX) 75 mg tablet Take 75 mg by mouth once daily.    diclofenac sodium (VOLTAREN) 1 % Gel Apply 2 g topically daily as needed (PAIN).    diltiaZEM (CARDIZEM CD) 180 MG 24 hr capsule Take 180 mg by mouth once daily.    DULoxetine (CYMBALTA) 60 MG capsule Take 60 mg by mouth once daily.    fluticasone-vilanterol (BREO ELLIPTA) 100-25 mcg/dose diskus inhaler Inhale 1 puff into the lungs once daily. Controller    gabapentin (NEURONTIN) 300 MG capsule Take 300 mg by mouth once daily.    Lactobacillus rhamnosus-fiber 2.5 billion cell-3.5 gram PwPk Take 1 capsule by mouth.    lansoprazole (PREVACID) 30 MG capsule Take 30 mg by mouth once daily.    losartan (COZAAR) 100 MG tablet Take 100 mg by mouth once daily.     ondansetron (ZOFRAN) 4 MG tablet Take 1 tablet (4 mg total) by mouth every 6 (six) hours as needed.    ondansetron (ZOFRAN) 4 MG tablet Take 4-8 mg by mouth.    potassium chloride SA (K-DUR,KLOR-CON) 10 MEQ tablet Take 10 mEq by mouth.    predniSONE (DELTASONE) 10 MG tablet Take 4 tablets (40 mg) on 12/16, then take 1 tablet (10 mg) daily    pyridoxine, vitamin B6, (VITAMIN B-6) 50 MG Tab Take 1 tablet (50 mg total) by mouth once daily.    simvastatin (ZOCOR) 40 MG tablet Take 40 mg by mouth.    theophylline (THEODUR) 300 mg 24 hr capsule Take 300 mg by mouth once daily.    traMADol (ULTRAM) 50 mg tablet TK 1 T PO BID PRN       Review of patient's allergies indicates:   Allergen Reactions    Ace inhibitors Swelling    Carvedilol      Other reaction(s): Other (See Comments)  blister    Hydralazine analogues     Metformin Nausea And Vomiting    Tetracycline Itching    Tetracyclines Swelling    Travatan (with benzalkonium) [travoprost (benzalkonium)]     Travoprost Itching     Other reaction(s): Other (See Comments)  Blurry vision       Past  Medical History:   Diagnosis Date    Abnormal LFTs 12/14/2018    Asthma     Closed compression fracture of fourth lumbar vertebra     COPD (chronic obstructive pulmonary disease)     Coronary artery disease     Diabetes mellitus     Fall 10/4/2018    Fracture     right tib & fib    Glaucoma     High cholesterol     Hypertension     Infected incision 9/20/2018    Intractable nausea and vomiting 1/23/2019    Iritis     Pulmonary embolus     S/P appendectomy 9/11/2018    Stroke     rt sided weakness.     Past Surgical History:   Procedure Laterality Date    ABDOMINAL SURGERY      APPENDECTOMY N/A 8/26/2018    Performed [facility]rez [facility] at [facility] OR    APPENDECTOMY, LAPAROSCOPIC---CONVERTED TO OPEN APPENDECTOMY @0950 N/A 8/26/2018    Performed [facility] at [facility] OR    APPLICATION, WOUND VAC N/A 1/25/2019    Performed by Monster Laurent MD at [facility] OR [address] [place] [address]    BACK SURGERY      stimulator    CATARACT EXTRACTION      EXCISION, ABSCESS- drainage abdominal wall abscess N/A 1/25/2019    Performed by Monster Laurent MD at [facility] OR [address] [place] [address]    HYSTERECTOMY      INCISION AND DRAINAGE, ABSCESS-  drainage of pelvic abscess N/A 1/25/2019    Performed by Monster Laurent MD at [facility] OR [address] [place] [address]    LAPAROTOMY, EXPLORATORY N/A 1/25/2019    [facility]    TOTAL HIP ARTHROPLASTY Left     2008     Family History     Problem Relation (Age of Onset)    Cancer Father    Diabetes Brother    Lupus Sister    Multiple sclerosis Mother        Tobacco Use    Smoking status: Never Smoker    Smokeless tobacco: Never Used   Substance and Sexual Activity    Alcohol use: No     Frequency: Never    Drug use: No    Sexual activity: No     Partners: Male     Review of Systems  Objective:     Vital Signs (Most Recent):  Temp: 98.3 °F (36.8 °C) (01/31/19 1105)  Pulse: 99 (01/31/19 1309)  Resp: 17 (01/31/19 1309)  BP: (!) 175/84 (01/31/19  1300)  SpO2: 100 % (01/31/19 1309) Vital Signs (24h Range):  Temp:  [97.6 °F (36.4 °C)-98.3 °F (36.8 °C)] 98.3 °F (36.8 °C)  Pulse:  [] 99  Resp:  [6-28] 17  SpO2:  [96 %-100 %] 100 %  BP: (123-176)/(64-89) 175/84     Weight: 71.2 kg (157 lb)  Body mass index is 29.66 kg/m².    Date 01/31/19 0700 - 02/01/19 0659   Shift 2955-6810 1200-2601 4957-9285 24 Hour Total   INTAKE   Blood 243   243   Shift Total(mL/kg) 243(3.4)   243(3.4)   OUTPUT   Urine(mL/kg/hr) 1895 1895   Drains 42   42   Other 50   50   Shift Total(mL/kg) 1987(27.9)   1987(27.9)   Weight (kg) 71.2 71.2 71.2 71.2       Physical Exam    General:Appears ill. Chronically deconditioned  Voice: Regular for age, good volume  Respiratory: On nasal canula. Symmetric breathing, no stridor, no distress  Head: Normocephalic, no lesions  Face: Symmetric, HB 1/6 bilat, no obvious sinus tenderness, salivary glands nontender  Eyes: Sclera white, extraocular movements intact  Nose: Dorsum straight  Right Ear: Pinna and external ear appears normal  Left Ear: Pinna and external ear appears normal  Hearing: Grossly intact  Oral cavity: Patient with torus palatinus.  Small area of ulceration. No bleeding. No hematoma. Sensation intact. , normal pharyngeal wall movement  Neck: Trachea midline. Triple lumen intact.   Cardiovascular system: Pulses regular in both upper extremities, good skin turgor        Significant Labs:  ABGs:   Recent Labs   Lab 01/28/19 2012   PH 7.296*   PCO2 50.8*   HCO3 24.8   POCSATURATED 73*   BE -2     BMP:   Recent Labs   Lab 01/31/19  0820   *      CO2 29   BUN 21   CREATININE 0.7   CALCIUM 7.6*   MG 2.3     Cardiac Markers: No results for input(s): CKMB, TROPONINT, MYOGLOBIN in the last 168 hours.  CBC:   Recent Labs   Lab 01/31/19  0645   WBC 8.88   RBC 2.61*   HGB 7.0*   HCT 20.5*   PLT 43*   MCV 79*   MCH 26.8*   MCHC 34.1     CMP:   Recent Labs   Lab 01/31/19  0820   *   CALCIUM 7.6*   ALBUMIN 1.7*   PROT 4.0*       K 3.5   CO2 29      BUN 21   CREATININE 0.7   ALKPHOS 996*   ALT 66*   *   BILITOT 4.5*     Coagulation: No results for input(s): LABPROT, INR, APTT in the last 168 hours.  CRP: No results for input(s): CRP in the last 168 hours.  LFTs:   Recent Labs   Lab 01/31/19  0820   ALT 66*   *   ALKPHOS 996*   BILITOT 4.5*   PROT 4.0*   ALBUMIN 1.7*       Significant Diagnostics:  CT head seen. [place] huyen seen also.

## 2019-01-31 NOTE — PROGRESS NOTES
"[facility]  General Surgery  Progress Note    Subjective:     History of Present Illness:  62 yo W with a history of HTN, COPD on home oxygen, HFpEF, IDDMII, CVA on plavix, HTN, debility after fall and broken hip, and PE who presents to the ED with a several month history of nausea, vomiting, inability to tolerate a diet and weight loss. She has had multiple admissions in the past few months for different ailments. She presents today with continued nausea, vomiting and abdominal pain that is mostly worse in the RLQ. During the examination, she states her pain was all over her abdomen because she was retching so much. She states that she has lost close to 40lbs since her surgery and that she only able to keep broth down. She had a lap converted to open appendectomy back in August 2018 that was complicated by a wound infection, C diff and failure to thrive. This seems to persists. She is now wheelchair bound (per her) and apparently lives in [place] but is here in [place] with her daughter.    She had a CT scan in the ED which revealed an irregular shaped rim enhancing fluid collection measuring at least 4.0 x 3.0 cm ight hemipelvis at the site of prior appendectomy. Surgery was consulted for further recommendations. She was also noted to have a "knot" at the RLQ incision site that is also tender.    Post-Op Info:  Procedure(s) (LRB):  LAPAROTOMY, EXPLORATORY (N/A)  INCISION AND DRAINAGE, ABSCESS-  drainage of pelvic abscess (N/A)  EXCISION, ABSCESS- drainage abdominal wall abscess (N/A)  APPLICATION, WOUND VAC (N/A)   6 Days Post-Op     Interval History: HDS o/n. Good UOP 3300 ml/h24h with 40 lasix x2. Serous drainage from JPs. Seen by speech, blister on hard palate, NPO for now. Afebrile.     Medications:  Continuous Infusions:   insulin (HUMAN R) infusion (adults) 1.4 Units/hr (01/31/19 0800)    TPN ADULT CENTRAL LINE CUSTOM 60 mL/hr at 01/31/19 0800    TPN ADULT CENTRAL LINE CUSTOM   "     Scheduled Meds:   albuterol-ipratropium  3 mL Nebulization Q6H    ciprofloxacin  400 mg Intravenous Q12H    fat emulsion 20%  250 mL Intravenous Daily    fat emulsion 20%  250 mL Intravenous Daily    fluticasone-vilanterol  1 puff Inhalation Daily    furosemide  40 mg Intravenous BID    hydrocortisone sodium succinate  25 mg Intravenous BID    metronidazole  500 mg Intravenous Q8H    pantoprozole (PROTONIX) IV  40 mg Intravenous Q12H    vancomycin (VANCOCIN) IVPB  750 mg Intravenous Q24H     PRN Meds:sodium chloride, albuterol sulfate, dextrose 50%, dextrose 50%, diclofenac sodium, diphenhydrAMINE, glucagon (human recombinant), HYDROmorphone, ondansetron, promethazine (PHENERGAN) IVPB, sodium chloride 0.9%, sodium chloride 0.9%, white petrolatum     Review of patient's allergies indicates:   Allergen Reactions    Ace inhibitors Swelling    Carvedilol      Other reaction(s): Other (See Comments)  blister    Hydralazine analogues     Metformin Nausea And Vomiting    Tetracycline Itching    Tetracyclines Swelling    Travatan (with benzalkonium) [travoprost (benzalkonium)]     Travoprost Itching     Other reaction(s): Other (See Comments)  Blurry vision     Objective:     Vital Signs (Most Recent):  Temp: 98 °F (36.7 °C) (01/31/19 0815)  Pulse: 99 (01/31/19 0830)  Resp: 19 (01/31/19 0830)  BP: (!) 143/82 (01/31/19 0815)  SpO2: 100 % (01/31/19 0830) Vital Signs (24h Range):  Temp:  [97.6 °F (36.4 °C)-98.2 °F (36.8 °C)] 98 °F (36.7 °C)  Pulse:  [] 99  Resp:  [6-28] 19  SpO2:  [94 %-100 %] 100 %  BP: (123-161)/() 143/82     Weight: 71.2 kg (157 lb)  Body mass index is 29.66 kg/m².    Intake/Output - Last 3 Shifts       01/29 0700 - 01/30 0659 01/30 0700 - 01/31 0659 01/31 0700 - 02/01 0659    I.V. (mL/kg) 3184.2 (44.7) 65.5 (0.9)     NG/GT       IV Piggyback 1050 400     .5 1947.5     Total Intake(mL/kg) 5011.7 (70.4) 2413.1 (33.9)     Urine (mL/kg/hr) 3185 (1.9) 3270 (1.9) 95 (0.7)     Drains 460 490 22    Other 125 225 0    Stool  0     Total Output 3770 3985 117    Net +1241.7 -1572 -117           Stool Occurrence  0 x           Physical Exam   Constitutional: No distress.   HENT:   Head: Normocephalic and atraumatic.   Pulmonary/Chest: Effort normal. No respiratory distress.   Abdominal:   Abdomen soft, midline incision with prevena vac, pelvic drain serous  RLQ former incision abscess site clean  Area RLQ above incision with denuded skin (prev blister)   Neurological: She is alert.   Skin: Skin is warm and dry. She is not diaphoretic.   significant edema extremities    Significant Labs:  CBC:   Recent Labs   Lab 01/31/19  0645   WBC 8.88   RBC 2.61*   HGB 7.0*   HCT 20.5*   PLT 43*   MCV 79*   MCH 26.8*   MCHC 34.1     BMP:   Recent Labs   Lab 01/30/19  2345   *   *   K 3.9      CO2 26   BUN 17   CREATININE 0.7   CALCIUM 7.5*   MG 2.2     ABGs:   Recent Labs   Lab 01/28/19 2012   PH 7.296*   PCO2 50.8*   PO2 43   HCO3 24.8   POCSATURATED 73*   BE -2           Assessment/Plan:     Depression    Hold home meds     Hypophosphatemia    Replace prn     Debility    PT/OT     Hypokalemia    Replace prn     Generalized abdominal pain    62 yo female presenting with abdominal pain, nausea, elevated lactate s/p open drainage pelvic abscess 1/25     -cont broad spec abx, f/u cultures- abdominal with bacteroides, blood cx from admit MRSA x2, cont cipro/flagyl   -npo for now  -wocn following, appreciate help with wounds/blisters, plan to remove incisional wound vac today  -cont ICU care     Moderate malnutrition    -cont TPN     Essential hypertension    Hold home meds for now     Gastroesophageal reflux disease without esophagitis    ppi     (HFpEF) heart failure with preserved ejection fraction    Last echo 8/2018 with no WMAs, grade 1DD, EF 60  - strict I/O, daily weights  -cont diuresis today     Moderate asthma without complication    -sched duonebs     CAD (coronary artery  disease)    - cards consulted for EKG changes, mild elevation in troponin, type II MI, on ASA/clopidogrel- now holding for thrombocytopenia     Insulin dependent diabetes mellitus    Insulin gtt         Sierra Real MD  General Surgery  [facility]

## 2019-01-31 NOTE — ASSESSMENT & PLAN NOTE
BG goal 140 - 180     Transition at 1.4 u/hr with stepdown parameters  BG monitoring every 4 hours and moderate dose correction scale.     ADDENDUM: rewrite insulin infusion at 1.1 u/hr     Discharge planning: LARRY

## 2019-01-31 NOTE — ASSESSMENT & PLAN NOTE
Last echo 8/2018 with no WMAs, grade 1DD, EF 60  - strict I/O, daily weights  -cont diuresis today

## 2019-01-31 NOTE — CONSULTS
[facility]  Otorhinolaryngology-Head & Neck Surgery  Consult Note    Patient Name: Sheryl Martines  MRN: [id number]  Code Status: Full Code  Admission Date: 1/23/2019  Hospital Length of Stay: 7 days  Attending Physician: Monster Laurent MD  Primary Care Provider: Primary Doctor No    Patient information was obtained from patient and ER records.     Inpatient consult to ENT  Consult performed by: Alberto Arriaga MD  Consult ordered by: Sudhakar López MD        Subjective:     Chief Complaint/Reason for Admission: abdominal abscess    History of Present Illness: 64 yo W with a history of HTN, COPD on home oxygen, HFpEF, IDDMII, CVA on plavix, HTN, debility after fall and broken hip, Appendectomy (lap converted to open 8/18) and PE who presents to the ED with an abdominal abscess. S/p ex lap and washout by General Surgery (1/25/19) (extubated 1/29/19). On rounds today, noted to have fullness of hard palate (patient reported some tenderness). Otolaryngology consulted for evaluation.   She reports this lesion has been there for most of her life. No bleeding. No recent increase in swelling.  Of note, the patient was kept intubated for at least 4 days following surgery.     Medications:  Continuous Infusions:   insulin (HUMAN R) infusion (adults) 1.4 Units/hr (01/31/19 1300)    TPN ADULT CENTRAL LINE CUSTOM 60 mL/hr at 01/31/19 1300    TPN ADULT CENTRAL LINE CUSTOM       Scheduled Meds:   albuterol-ipratropium  3 mL Nebulization Q6H    ciprofloxacin  400 mg Intravenous Q12H    fat emulsion 20%  250 mL Intravenous Daily    fluticasone-vilanterol  1 puff Inhalation Daily    furosemide  40 mg Intravenous BID    hydrocortisone sodium succinate  25 mg Intravenous BID    metronidazole  500 mg Intravenous Q8H    pantoprozole (PROTONIX) IV  40 mg Intravenous Q12H    potassium chloride  20 mEq Intravenous Q2H    vancomycin (VANCOCIN) IVPB  750 mg Intravenous Q24H     PRN Meds:sodium chloride,  albuterol sulfate, dextrose 50%, dextrose 50%, diclofenac sodium, diphenhydrAMINE, glucagon (human recombinant), glucose, glucose, HYDROmorphone, insulin aspart U-100, ondansetron, promethazine (PHENERGAN) IVPB, sodium chloride 0.9%, sodium chloride 0.9%, white petrolatum     No current facility-administered medications on file prior to encounter.      Current Outpatient Medications on File Prior to Encounter   Medication Sig    acetaminophen (TYLENOL) 500 MG tablet Take 1,000 mg by mouth 2 (two) times daily as needed for Pain.    albuterol (PROVENTIL/VENTOLIN HFA) 90 mcg/actuation inhaler Inhale 2 puffs into the lungs every 6 (six) hours as needed for Wheezing or Shortness of Breath. Rescue    albuterol-ipratropium (DUO-NEB) 2.5 mg-0.5 mg/3 mL nebulizer solution Take 3 mLs by nebulization every 4 (four) hours as needed for Wheezing or Shortness of Breath. Rescue     aspirin (ECOTRIN) 81 MG EC tablet Take 1 tablet (81 mg total) by mouth once daily.    baclofen (LIORESAL) 10 MG tablet Take 10 mg by mouth 2 (two) times daily as needed (MUSCLE SPASMS).    cefdinir (OMNICEF) 300 MG capsule TK ONE C PO  BID FOR 7 DAYS    cephALEXin (KEFLEX) 750 MG capsule TK ONE C PO TID FOR 5 DAYS    ciprofloxacin HCl (CIPRO) 250 MG tablet Take 1 tablet (250 mg total) by mouth every 12 (twelve) hours. Starting 12/15 evening    clopidogrel (PLAVIX) 75 mg tablet Take 75 mg by mouth once daily.    diclofenac sodium (VOLTAREN) 1 % Gel Apply 2 g topically daily as needed (PAIN).    diltiaZEM (CARDIZEM CD) 180 MG 24 hr capsule Take 180 mg by mouth once daily.    DULoxetine (CYMBALTA) 60 MG capsule Take 60 mg by mouth once daily.    fluticasone-vilanterol (BREO ELLIPTA) 100-25 mcg/dose diskus inhaler Inhale 1 puff into the lungs once daily. Controller    gabapentin (NEURONTIN) 300 MG capsule Take 300 mg by mouth once daily.    Lactobacillus rhamnosus-fiber 2.5 billion cell-3.5 gram PwPk Take 1 capsule by mouth.    lansoprazole  (PREVACID) 30 MG capsule Take 30 mg by mouth once daily.    losartan (COZAAR) 100 MG tablet Take 100 mg by mouth once daily.     ondansetron (ZOFRAN) 4 MG tablet Take 1 tablet (4 mg total) by mouth every 6 (six) hours as needed.    ondansetron (ZOFRAN) 4 MG tablet Take 4-8 mg by mouth.    potassium chloride SA (K-DUR,KLOR-CON) 10 MEQ tablet Take 10 mEq by mouth.    predniSONE (DELTASONE) 10 MG tablet Take 4 tablets (40 mg) on 12/16, then take 1 tablet (10 mg) daily    pyridoxine, vitamin B6, (VITAMIN B-6) 50 MG Tab Take 1 tablet (50 mg total) by mouth once daily.    simvastatin (ZOCOR) 40 MG tablet Take 40 mg by mouth.    theophylline (THEODUR) 300 mg 24 hr capsule Take 300 mg by mouth once daily.    traMADol (ULTRAM) 50 mg tablet TK 1 T PO BID PRN       Review of patient's allergies indicates:   Allergen Reactions    Ace inhibitors Swelling    Carvedilol      Other reaction(s): Other (See Comments)  blister    Hydralazine analogues     Metformin Nausea And Vomiting    Tetracycline Itching    Tetracyclines Swelling    Travatan (with benzalkonium) [travoprost (benzalkonium)]     Travoprost Itching     Other reaction(s): Other (See Comments)  Blurry vision       Past Medical History:   Diagnosis Date    Abnormal LFTs 12/14/2018    Asthma     Closed compression fracture of fourth lumbar vertebra     COPD (chronic obstructive pulmonary disease)     Coronary artery disease     Diabetes mellitus     Fall 10/4/2018    Fracture     right tib & fib    Glaucoma     High cholesterol     Hypertension     Infected incision 9/20/2018    Intractable nausea and vomiting 1/23/2019    Iritis     Pulmonary embolus     S/P appendectomy 9/11/2018    Stroke     rt sided weakness.     Past Surgical History:   Procedure Laterality Date    ABDOMINAL SURGERY      APPENDECTOMY N/A 8/26/2018    Per[facility] OR    APPENDECTOMY, LAPAROSCOPIC---CONVERTED TO OPEN APPENDECTOMY  @0950 N/A 8/26/2018    [facility] OR    APPLICATION, WOUND VAC N/A 1/25/2019    Performed by [facility] at [facility] OR [address] [place] [address]    BACK SURGERY      stimulator    CATARACT EXTRACTION      EXCISION, ABSCESS- drainage abdominal wall abscess N/A 1/25/2019    [facility]    HYSTERECTOMY      INCISION AND DRAINAGE, ABSCESS-  drainage of pelvic abscess N/A 1/25/2019    [facility]    LAPAROTOMY, EXPLORATORY N/A 1/25/2019    [facility]    TOTAL HIP ARTHROPLASTY Left     2008     Family History     Problem Relation (Age of Onset)    Cancer Father    Diabetes Brother    Lupus Sister    Multiple sclerosis Mother        Tobacco Use    Smoking status: Never Smoker    Smokeless tobacco: Never Used   Substance and Sexual Activity    Alcohol use: No     Frequency: Never    Drug use: No    Sexual activity: No     Partners: Male     Review of Systems  Objective:     Vital Signs (Most Recent):  Temp: 98.3 °F (36.8 °C) (01/31/19 1105)  Pulse: 99 (01/31/19 1309)  Resp: 17 (01/31/19 1309)  BP: (!) 175/84 (01/31/19 1300)  SpO2: 100 % (01/31/19 1309) Vital Signs (24h Range):  Temp:  [97.6 °F (36.4 °C)-98.3 °F (36.8 °C)] 98.3 °F (36.8 °C)  Pulse:  [] 99  Resp:  [6-28] 17  SpO2:  [96 %-100 %] 100 %  BP: (123-176)/(64-89) 175/84     Weight: 71.2 kg (157 lb)  Body mass index is 29.66 kg/m².    Date 01/31/19 0700 - 02/01/19 0659   Shift 7222-0874 9312-1942 2688-6195 24 Hour Total   INTAKE   Blood 243   243   Shift Total(mL/kg) 243(3.4)   243(3.4)   OUTPUT   Urine(mL/kg/hr) 1895   1895   Drains 42   42   Other 50   50   Shift Total(mL/kg) 1987(27.9)   1987(27.9)   Weight (kg) 71.2 71.2 71.2 71.2       Physical Exam    General:Appears ill. Chronically deconditioned  Voice: Regular for age, good volume  Respiratory: On nasal canula. Symmetric breathing, no  stridor, no distress  Head: Normocephalic, no lesions  Face: Symmetric, HB 1/6 bilat, no obvious sinus tenderness, salivary glands nontender  Eyes: Sclera white, extraocular movements intact  Nose: Dorsum straight  Right Ear: Pinna and external ear appears normal  Left Ear: Pinna and external ear appears normal  Hearing: Grossly intact  Oral cavity: Patient with torus pallucerous.  Small area of ulceration. No bleeding. No hematoma. Sensation intact. , normal pharyngeal wall movement  Neck: Trachea midline. Triple lumen intact.   Cardiovascular system: Pulses regular in both upper extremities, good skin turgor        Significant Labs:  ABGs:   Recent Labs   Lab 01/28/19 2012   PH 7.296*   PCO2 50.8*   HCO3 24.8   POCSATURATED 73*   BE -2     BMP:   Recent Labs   Lab 01/31/19  0820   *      CO2 29   BUN 21   CREATININE 0.7   CALCIUM 7.6*   MG 2.3     Cardiac Markers: No results for input(s): CKMB, TROPONINT, MYOGLOBIN in the last 168 hours.  CBC:   Recent Labs   Lab 01/31/19  0645   WBC 8.88   RBC 2.61*   HGB 7.0*   HCT 20.5*   PLT 43*   MCV 79*   MCH 26.8*   MCHC 34.1     CMP:   Recent Labs   Lab 01/31/19  0820   *   CALCIUM 7.6*   ALBUMIN 1.7*   PROT 4.0*      K 3.5   CO2 29      BUN 21   CREATININE 0.7   ALKPHOS 996*   ALT 66*   *   BILITOT 4.5*     Coagulation: No results for input(s): LABPROT, INR, APTT in the last 168 hours.  CRP: No results for input(s): CRP in the last 168 hours.  LFTs:   Recent Labs   Lab 01/31/19  0820   ALT 66*   *   ALKPHOS 996*   BILITOT 4.5*   PROT 4.0*   ALBUMIN 1.7*       Significant Diagnostics:  CT head seen. [place] huyen seen also.     Assessment/Plan:     Jovita Capone    64 yo W with a history of HTN, COPD on home oxygen, HFpEF, IDDMII, CVA on plavix, HTN,  Appendectomy (lap converted to open 8/18) and PE who presents to the ED with an abdominal abscess 1/25/18 s/p ex lap and washout. Extubated 1/29/19. Complained of mouth pain,  found to have fullness of hard palate, Otolaryngology consulted. This is a palatine huyen - normal variant of palatal anatomy.  Her discomfort is likely from the fact that she was intubated for 4 days and the huyen is irritated from the ETT (either that or related to the act of intubation).  Small area of irritation (likely from above), this will heal by self, may consider mouth rinses.    -No intervention       VTE Risk Mitigation (From admission, onward)        Ordered     IP VTE HIGH RISK PATIENT  Once      01/25/19 1049     Place sequential compression device  Until discontinued      01/25/19 1049     Place sequential compression device  Until discontinued      01/24/19 0027     Place MARGIE hose  Until discontinued      01/24/19 0027          Thank you for your consult. I will sign off. Please contact us if you have any additional questions.    Alberto Arriaga MD  Otorhinolaryngology-Head & Neck Surgery  [facility]

## 2019-01-31 NOTE — HPI
64 yo W with a history of HTN, COPD on home oxygen, HFpEF, IDDMII, CVA on plavix, HTN, debility after fall and broken hip, Appendectomy (lap converted to open 8/18) and PE who presents to the ED with an abdominal abscess. S/p ex lap and washout by General Surgery (1/25/19) (extubated 1/29/19). On rounds today, noted to have fullness of hard palate (patient reported some tenderness). Otolaryngology consulted for evaluation.   She reports this lesion has been there for most of her life. No bleeding. No recent increase in swelling.  Of note, the patient was kept intubated for at least 4 days following surgery.

## 2019-01-31 NOTE — PHYSICIAN QUERY
PT Name: Sheryl Martines  MR #: [id number]     PHYSICIAN QUERY -  ACUITY OF CONDITION CLARIFICATION      CDS/: JUDY Ge,RNC-[email or web address]          Contact information:[email or web address]  This form is a permanent document in the medical record.     Query Date: January 31, 2019    By submitting this query, we are merely seeking further clarification of documentation to reflect the severity of illness of your patient. Please utilize your independent clinical judgment when addressing the question(s) below.    The Medical record reflects the following:     Indicators   Supporting Clinical Findings Location in Medical Record   X Documentation of condition Pelvic abscess in female H&P 1/26    Lab Value(s)     X Radiology Findings CT shows fluid collection in R hemipelvis.  H&P 1/26   X Treatment                                 Medication Started on vanc and zosyn. Patient now s/p exploratory laparotomy, washout, and pelvic abscess drainage on 1/25/19. H&P 1/26   X Other  lap converted to open appendectomy 8/2018 c/b C diff, FTT, and a round infection presents to the ED for acute on chronic increased generalized abdominal pain with associated NBNB emesis, nausea and decreased appetite.    H&P 1/24     Provider, please specify the acuity/chronicity of Pelvic abscess in female :    [   ] Acute   [ x  ] Acute and/on chronic   [   ] Ruled Out   [   ]  Clinically Undetermined       Please document in your progress notes daily for the duration of treatment until resolved, and include in your discharge summary.

## 2019-02-01 LAB
ABO + RH BLD: NORMAL
ALBUMIN SERPL BCP-MCNC: 1.7 G/DL
ALP SERPL-CCNC: 1136 U/L
ALP SERPL-CCNC: 1149 U/L
ALP SERPL-CCNC: 1183 U/L
ALT SERPL W/O P-5'-P-CCNC: 145 U/L
ALT SERPL W/O P-5'-P-CCNC: 158 U/L
ALT SERPL W/O P-5'-P-CCNC: 172 U/L
ANION GAP SERPL CALC-SCNC: 10 MMOL/L
ANION GAP SERPL CALC-SCNC: 3 MMOL/L
ANION GAP SERPL CALC-SCNC: 5 MMOL/L
ANION GAP SERPL CALC-SCNC: 7 MMOL/L
AST SERPL-CCNC: 235 U/L
AST SERPL-CCNC: 277 U/L
AST SERPL-CCNC: 277 U/L
BASOPHILS # BLD AUTO: 0.01 K/UL
BASOPHILS # BLD AUTO: 0.01 K/UL
BASOPHILS NFR BLD: 0.1 %
BASOPHILS NFR BLD: 0.1 %
BILIRUB SERPL-MCNC: 4.8 MG/DL
BILIRUB SERPL-MCNC: 5.1 MG/DL
BILIRUB SERPL-MCNC: 5.1 MG/DL
BLD GP AB SCN CELLS X3 SERPL QL: NORMAL
BUN SERPL-MCNC: 23 MG/DL
BUN SERPL-MCNC: 24 MG/DL
CA-I BLDV-SCNC: 0.98 MMOL/L
CA-I BLDV-SCNC: 1.1 MMOL/L
CALCIUM SERPL-MCNC: 7.8 MG/DL
CALCIUM SERPL-MCNC: 7.8 MG/DL
CALCIUM SERPL-MCNC: 7.9 MG/DL
CALCIUM SERPL-MCNC: 8 MG/DL
CHLORIDE SERPL-SCNC: 100 MMOL/L
CHLORIDE SERPL-SCNC: 100 MMOL/L
CHLORIDE SERPL-SCNC: 98 MMOL/L
CHLORIDE SERPL-SCNC: 99 MMOL/L
CO2 SERPL-SCNC: 27 MMOL/L
CO2 SERPL-SCNC: 31 MMOL/L
CO2 SERPL-SCNC: 31 MMOL/L
CO2 SERPL-SCNC: 36 MMOL/L
CREAT SERPL-MCNC: 0.6 MG/DL
DIFFERENTIAL METHOD: ABNORMAL
DIFFERENTIAL METHOD: ABNORMAL
EOSINOPHIL # BLD AUTO: 0 K/UL
EOSINOPHIL # BLD AUTO: 0 K/UL
EOSINOPHIL NFR BLD: 0.1 %
EOSINOPHIL NFR BLD: 0.1 %
ERYTHROCYTE [DISTWIDTH] IN BLOOD BY AUTOMATED COUNT: 17.8 %
ERYTHROCYTE [DISTWIDTH] IN BLOOD BY AUTOMATED COUNT: 18.1 %
EST. GFR  (AFRICAN AMERICAN): >60 ML/MIN/1.73 M^2
EST. GFR  (NON AFRICAN AMERICAN): >60 ML/MIN/1.73 M^2
GLUCOSE SERPL-MCNC: 112 MG/DL
GLUCOSE SERPL-MCNC: 116 MG/DL
GLUCOSE SERPL-MCNC: 142 MG/DL
GLUCOSE SERPL-MCNC: 185 MG/DL
HCT VFR BLD AUTO: 24.2 %
HCT VFR BLD AUTO: 24.3 %
HGB BLD-MCNC: 8.3 G/DL
HGB BLD-MCNC: 8.4 G/DL
IMM GRANULOCYTES # BLD AUTO: 0.16 K/UL
IMM GRANULOCYTES # BLD AUTO: 0.18 K/UL
IMM GRANULOCYTES NFR BLD AUTO: 2.3 %
IMM GRANULOCYTES NFR BLD AUTO: 2.6 %
LYMPHOCYTES # BLD AUTO: 0.8 K/UL
LYMPHOCYTES # BLD AUTO: 1.1 K/UL
LYMPHOCYTES NFR BLD: 11.6 %
LYMPHOCYTES NFR BLD: 15.4 %
MAGNESIUM SERPL-MCNC: 1.8 MG/DL
MAGNESIUM SERPL-MCNC: 1.9 MG/DL
MCH RBC QN AUTO: 27.8 PG
MCH RBC QN AUTO: 28.1 PG
MCHC RBC AUTO-ENTMCNC: 34.2 G/DL
MCHC RBC AUTO-ENTMCNC: 34.7 G/DL
MCV RBC AUTO: 81 FL
MCV RBC AUTO: 81 FL
MONOCYTES # BLD AUTO: 0.7 K/UL
MONOCYTES # BLD AUTO: 0.7 K/UL
MONOCYTES NFR BLD: 10.2 %
MONOCYTES NFR BLD: 9.6 %
NEUTROPHILS # BLD AUTO: 5.1 K/UL
NEUTROPHILS # BLD AUTO: 5.3 K/UL
NEUTROPHILS NFR BLD: 72.5 %
NEUTROPHILS NFR BLD: 75.4 %
NRBC BLD-RTO: 0 /100 WBC
NRBC BLD-RTO: 1 /100 WBC
PHOSPHATE SERPL-MCNC: 2.3 MG/DL
PHOSPHATE SERPL-MCNC: 2.7 MG/DL
PLATELET # BLD AUTO: 106 K/UL
PLATELET # BLD AUTO: 116 K/UL
PMV BLD AUTO: 10.3 FL
PMV BLD AUTO: 10.8 FL
POCT GLUCOSE: 125 MG/DL (ref 70–110)
POCT GLUCOSE: 144 MG/DL (ref 70–110)
POCT GLUCOSE: 221 MG/DL (ref 70–110)
POTASSIUM SERPL-SCNC: 3 MMOL/L
POTASSIUM SERPL-SCNC: 3.1 MMOL/L
POTASSIUM SERPL-SCNC: 3.7 MMOL/L
POTASSIUM SERPL-SCNC: 3.8 MMOL/L
PROT SERPL-MCNC: 3.9 G/DL
PROT SERPL-MCNC: 4 G/DL
PROT SERPL-MCNC: 4.2 G/DL
RBC # BLD AUTO: 2.99 M/UL
RBC # BLD AUTO: 2.99 M/UL
SODIUM SERPL-SCNC: 136 MMOL/L
SODIUM SERPL-SCNC: 137 MMOL/L
VANCOMYCIN TROUGH SERPL-MCNC: 15.6 UG/ML
WBC # BLD AUTO: 6.97 K/UL
WBC # BLD AUTO: 6.97 K/UL

## 2019-02-01 PROCEDURE — 63600175 PHARM REV CODE 636 W HCPCS: Performed by: PHYSICIAN ASSISTANT

## 2019-02-01 PROCEDURE — 85025 COMPLETE CBC W/AUTO DIFF WBC: CPT | Mod: 91

## 2019-02-01 PROCEDURE — 83735 ASSAY OF MAGNESIUM: CPT | Mod: 91

## 2019-02-01 PROCEDURE — A4217 STERILE WATER/SALINE, 500 ML: HCPCS | Performed by: STUDENT IN AN ORGANIZED HEALTH CARE EDUCATION/TRAINING PROGRAM

## 2019-02-01 PROCEDURE — 80053 COMPREHEN METABOLIC PANEL: CPT

## 2019-02-01 PROCEDURE — 86850 RBC ANTIBODY SCREEN: CPT

## 2019-02-01 PROCEDURE — 82330 ASSAY OF CALCIUM: CPT

## 2019-02-01 PROCEDURE — 80048 BASIC METABOLIC PNL TOTAL CA: CPT

## 2019-02-01 PROCEDURE — 25000003 PHARM REV CODE 250: Performed by: STUDENT IN AN ORGANIZED HEALTH CARE EDUCATION/TRAINING PROGRAM

## 2019-02-01 PROCEDURE — 82330 ASSAY OF CALCIUM: CPT | Mod: 91

## 2019-02-01 PROCEDURE — 99232 SBSQ HOSP IP/OBS MODERATE 35: CPT | Mod: ,,, | Performed by: NURSE PRACTITIONER

## 2019-02-01 PROCEDURE — S0030 INJECTION, METRONIDAZOLE: HCPCS | Performed by: SURGERY

## 2019-02-01 PROCEDURE — 20000000 HC ICU ROOM

## 2019-02-01 PROCEDURE — 63600175 PHARM REV CODE 636 W HCPCS: Performed by: STUDENT IN AN ORGANIZED HEALTH CARE EDUCATION/TRAINING PROGRAM

## 2019-02-01 PROCEDURE — 83735 ASSAY OF MAGNESIUM: CPT

## 2019-02-01 PROCEDURE — 84100 ASSAY OF PHOSPHORUS: CPT | Mod: 91

## 2019-02-01 PROCEDURE — 99232 PR SUBSEQUENT HOSPITAL CARE,LEVL II: ICD-10-PCS | Mod: ,,, | Performed by: NURSE PRACTITIONER

## 2019-02-01 PROCEDURE — B4185 PARENTERAL SOL 10 GM LIPIDS: HCPCS | Performed by: STUDENT IN AN ORGANIZED HEALTH CARE EDUCATION/TRAINING PROGRAM

## 2019-02-01 PROCEDURE — 99900035 HC TECH TIME PER 15 MIN (STAT)

## 2019-02-01 PROCEDURE — 25000242 PHARM REV CODE 250 ALT 637 W/ HCPCS: Performed by: SURGERY

## 2019-02-01 PROCEDURE — 84100 ASSAY OF PHOSPHORUS: CPT

## 2019-02-01 PROCEDURE — 80202 ASSAY OF VANCOMYCIN: CPT

## 2019-02-01 PROCEDURE — 94640 AIRWAY INHALATION TREATMENT: CPT

## 2019-02-01 PROCEDURE — C9113 INJ PANTOPRAZOLE SODIUM, VIA: HCPCS | Performed by: PHYSICIAN ASSISTANT

## 2019-02-01 PROCEDURE — 99232 PR SUBSEQUENT HOSPITAL CARE,LEVL II: ICD-10-PCS | Mod: 25,,, | Performed by: ANESTHESIOLOGY

## 2019-02-01 PROCEDURE — 27000221 HC OXYGEN, UP TO 24 HOURS

## 2019-02-01 PROCEDURE — 94664 DEMO&/EVAL PT USE INHALER: CPT

## 2019-02-01 PROCEDURE — 80053 COMPREHEN METABOLIC PANEL: CPT | Mod: 91

## 2019-02-01 PROCEDURE — 25000003 PHARM REV CODE 250: Performed by: SURGERY

## 2019-02-01 PROCEDURE — 99232 SBSQ HOSP IP/OBS MODERATE 35: CPT | Mod: 25,,, | Performed by: ANESTHESIOLOGY

## 2019-02-01 PROCEDURE — 63600175 PHARM REV CODE 636 W HCPCS: Performed by: SURGERY

## 2019-02-01 PROCEDURE — 94761 N-INVAS EAR/PLS OXIMETRY MLT: CPT

## 2019-02-01 PROCEDURE — 36415 COLL VENOUS BLD VENIPUNCTURE: CPT

## 2019-02-01 RX ORDER — POTASSIUM CHLORIDE 14.9 MG/ML
20 INJECTION INTRAVENOUS
Status: COMPLETED | OUTPATIENT
Start: 2019-02-01 | End: 2019-02-01

## 2019-02-01 RX ORDER — POTASSIUM CHLORIDE 14.9 MG/ML
20 INJECTION INTRAVENOUS ONCE
Status: CANCELLED | OUTPATIENT
Start: 2019-02-01

## 2019-02-01 RX ORDER — POTASSIUM CHLORIDE 14.9 MG/ML
20 INJECTION INTRAVENOUS
Status: DISCONTINUED | OUTPATIENT
Start: 2019-02-01 | End: 2019-02-01

## 2019-02-01 RX ADMIN — SOYBEAN OIL 250 ML: 20 INJECTION, SOLUTION INTRAVENOUS at 09:02

## 2019-02-01 RX ADMIN — IPRATROPIUM BROMIDE AND ALBUTEROL SULFATE 3 ML: .5; 3 SOLUTION RESPIRATORY (INHALATION) at 08:02

## 2019-02-01 RX ADMIN — CIPROFLOXACIN 400 MG: 2 INJECTION, SOLUTION INTRAVENOUS at 10:02

## 2019-02-01 RX ADMIN — IPRATROPIUM BROMIDE AND ALBUTEROL SULFATE 3 ML: .5; 3 SOLUTION RESPIRATORY (INHALATION) at 12:02

## 2019-02-01 RX ADMIN — HYDROMORPHONE HYDROCHLORIDE 0.5 MG: 1 INJECTION, SOLUTION INTRAMUSCULAR; INTRAVENOUS; SUBCUTANEOUS at 06:02

## 2019-02-01 RX ADMIN — FLUTICASONE FUROATE AND VILANTEROL TRIFENATATE 1 PUFF: 100; 25 POWDER RESPIRATORY (INHALATION) at 08:02

## 2019-02-01 RX ADMIN — HYDROMORPHONE HYDROCHLORIDE 0.5 MG: 1 INJECTION, SOLUTION INTRAMUSCULAR; INTRAVENOUS; SUBCUTANEOUS at 09:02

## 2019-02-01 RX ADMIN — METRONIDAZOLE 500 MG: 500 INJECTION, SOLUTION INTRAVENOUS at 10:02

## 2019-02-01 RX ADMIN — METRONIDAZOLE 500 MG: 500 INJECTION, SOLUTION INTRAVENOUS at 06:02

## 2019-02-01 RX ADMIN — HYDROCORTISONE SODIUM SUCCINATE 12.5 MG: 100 INJECTION, POWDER, FOR SOLUTION INTRAMUSCULAR; INTRAVENOUS at 08:02

## 2019-02-01 RX ADMIN — PANTOPRAZOLE SODIUM 40 MG: 40 INJECTION, POWDER, LYOPHILIZED, FOR SOLUTION INTRAVENOUS at 09:02

## 2019-02-01 RX ADMIN — PANTOPRAZOLE SODIUM 40 MG: 40 INJECTION, POWDER, LYOPHILIZED, FOR SOLUTION INTRAVENOUS at 08:02

## 2019-02-01 RX ADMIN — POTASSIUM CHLORIDE 20 MEQ: 200 INJECTION, SOLUTION INTRAVENOUS at 04:02

## 2019-02-01 RX ADMIN — HYDROMORPHONE HYDROCHLORIDE 0.5 MG: 1 INJECTION, SOLUTION INTRAMUSCULAR; INTRAVENOUS; SUBCUTANEOUS at 02:02

## 2019-02-01 RX ADMIN — POTASSIUM CHLORIDE 20 MEQ: 200 INJECTION, SOLUTION INTRAVENOUS at 06:02

## 2019-02-01 RX ADMIN — FUROSEMIDE 40 MG: 10 INJECTION, SOLUTION INTRAVENOUS at 08:02

## 2019-02-01 RX ADMIN — FUROSEMIDE 40 MG: 10 INJECTION, SOLUTION INTRAVENOUS at 06:02

## 2019-02-01 RX ADMIN — HYDROMORPHONE HYDROCHLORIDE 0.5 MG: 1 INJECTION, SOLUTION INTRAMUSCULAR; INTRAVENOUS; SUBCUTANEOUS at 08:02

## 2019-02-01 RX ADMIN — METRONIDAZOLE 500 MG: 500 INJECTION, SOLUTION INTRAVENOUS at 02:02

## 2019-02-01 RX ADMIN — POTASSIUM CHLORIDE 20 MEQ: 200 INJECTION, SOLUTION INTRAVENOUS at 10:02

## 2019-02-01 RX ADMIN — POTASSIUM CHLORIDE 20 MEQ: 200 INJECTION, SOLUTION INTRAVENOUS at 08:02

## 2019-02-01 RX ADMIN — CIPROFLOXACIN 400 MG: 2 INJECTION, SOLUTION INTRAVENOUS at 09:02

## 2019-02-01 RX ADMIN — HYDROMORPHONE HYDROCHLORIDE 0.5 MG: 1 INJECTION, SOLUTION INTRAMUSCULAR; INTRAVENOUS; SUBCUTANEOUS at 11:02

## 2019-02-01 RX ADMIN — IPRATROPIUM BROMIDE AND ALBUTEROL SULFATE 3 ML: .5; 3 SOLUTION RESPIRATORY (INHALATION) at 07:02

## 2019-02-01 RX ADMIN — HYDROMORPHONE HYDROCHLORIDE 0.5 MG: 1 INJECTION, SOLUTION INTRAMUSCULAR; INTRAVENOUS; SUBCUTANEOUS at 04:02

## 2019-02-01 RX ADMIN — HYDROMORPHONE HYDROCHLORIDE 0.5 MG: 1 INJECTION, SOLUTION INTRAMUSCULAR; INTRAVENOUS; SUBCUTANEOUS at 12:02

## 2019-02-01 RX ADMIN — VANCOMYCIN HYDROCHLORIDE 750 MG: 750 INJECTION, POWDER, LYOPHILIZED, FOR SOLUTION INTRAVENOUS at 11:02

## 2019-02-01 RX ADMIN — POTASSIUM CHLORIDE: 2 INJECTION, SOLUTION, CONCENTRATE INTRAVENOUS at 09:02

## 2019-02-01 RX ADMIN — HYDROCORTISONE SODIUM SUCCINATE 12.5 MG: 100 INJECTION, POWDER, FOR SOLUTION INTRAMUSCULAR; INTRAVENOUS at 09:02

## 2019-02-01 RX ADMIN — CALCIUM GLUCONATE 1000 MG: 98 INJECTION, SOLUTION INTRAVENOUS at 09:02

## 2019-02-01 NOTE — PROGRESS NOTES
[facility]  Critical Care - Surgery  Progress Note    Patient Name: Sheryl Martines  MRN: [id number]  Admission Date: 1/23/2019  Hospital Length of Stay: 8 days  Code Status: Full Code  Attending Provider: Monster Laurent MD  Primary Care Provider: Primary Doctor No   Principal Problem: Pelvic abscess in female    Subjective:     Hospital/ICU Course:  No notes on file    Interval History/Significant Events: No acute events overnight. Wound vac removed at bedside yesterday; patient tolerated well. Received one unit platelets and one unit pRBC for low values yesterday; patient noting a general feeling of fatigue. Continues with diuresis (lasix 40 mg bid); good UOP overnight of  cc/hr.    Follow-up For: Procedure(s) (LRB):  LAPAROTOMY, EXPLORATORY (N/A)  INCISION AND DRAINAGE, ABSCESS-  drainage of pelvic abscess (N/A)  EXCISION, ABSCESS- drainage abdominal wall abscess (N/A)  APPLICATION, WOUND VAC (N/A)    Post-Operative Day: 7 Days Post-Op    Objective:     Vital Signs (Most Recent):  Temp: 97.9 °F (36.6 °C) (02/01/19 0700)  Pulse: 86 (02/01/19 0500)  Resp: 15 (02/01/19 0500)  BP: (!) 166/72 (02/01/19 0500)  SpO2: 100 % (02/01/19 0500) Vital Signs (24h Range):  Temp:  [97.7 °F (36.5 °C)-98.3 °F (36.8 °C)] 97.9 °F (36.6 °C)  Pulse:  [] 86  Resp:  [13-26] 15  SpO2:  [98 %-100 %] 100 %  BP: (143-178)/(71-99) 166/72     Weight: 71.2 kg (157 lb)  Body mass index is 29.66 kg/m².      Intake/Output Summary (Last 24 hours) at 2/1/2019 0807  Last data filed at 2/1/2019 0618  Gross per 24 hour   Intake 3590 ml   Output 4812 ml   Net -1222 ml       Physical Exam  Constitutional: She appears well-developed and well-nourished. No distress.   HENT:   Head: Normocephalic.   Eyes: Pupils are equal, round, and reactive to light.   Cardiovascular: Regular  rate and rhythm.   Pulmonary/Chest: No respiratory distress. She has no wheezes.   Extubated on 1L NC.   Abdominal: Soft. She exhibits no  distension.   Neurological:   lethargic   Skin: Skin is warm and dry.       Vents: Not on Ventilator.      Lines/Drains/Airways     Central Venous Catheter Line                 Port A Cath Single Lumen 01/23/19 1400 right subclavian 8 days         Trialysis (Dialysis) Catheter 01/26/19 1200 right internal jugular 5 days          Drain                 Closed/Suction Drain 01/25/19 1020 Right;Ventral Abdomen Bulb 19 Fr. 6 days         Urethral Catheter 01/25/19 0934 Straight-tip;Non-latex 16 Fr. 6 days                Significant Labs:    CBC/Anemia Profile:  Recent Labs   Lab 01/31/19  0645 01/31/19 1957 02/01/19  0645   WBC 8.88 8.67 6.97   HGB 7.0* 6.4* 8.4*   HCT 20.5* 19.4* 24.2*   PLT 43* 122* 116*   MCV 79* 80* 81*   RDW 19.1* 18.6* 18.1*        Chemistries:  Recent Labs   Lab 01/31/19  0820 01/31/19  1626 01/31/19 1957 02/01/19  0121 02/01/19  0645     --  137 137 136   K 3.5  --  3.0* 3.0* 3.7     --  99 100 100   CO2 29  --  31* 27 31*   BUN 21  --  22 23 24*   CREATININE 0.7  --  0.7 0.6 0.6   CALCIUM 7.6*  --  7.6* 7.8* 8.0*   ALBUMIN 1.7*  --  1.7*  --  1.7*   PROT 4.0*  --  4.0*  --  4.2*   BILITOT 4.5*  --  4.0*  --  5.1*   ALKPHOS 996*  --  955*  --  1,183*   ALT 66*  --  71*  --  145*   *  --  100*  --  277*   MG 2.3 2.0  --  1.9  --    PHOS 3.3 2.9  --  2.7  --          Significant Imaging:  I have reviewed all pertinent imaging results/findings within the past 24 hours.    Assessment/Plan:     * Pelvic abscess in female    63 y.o. female with hx of asthma, COPD, CAD, DM, HTN, CVA 2012 with R side deficits, HFpEF, severe debility, lap converted to open appendectomy 8/2018 complicated by cdiff, failure to thrive.  Hx of difficult intubation and delayed emergence, CO2 narcosis needing ICU stay.  Hx of PRN O2 at night. Patient now s/p exploratory laparotomy, washout, and pelvic abscess drainage on 1/25/19.    Post-operatively, on initial evaluation in PACU, patient hypotensive to  60s/40s, on BiPap. Currently fluid resuscitating with 3rd liter of LR, responding well to phenylephrine, starting phenylephrine infusion (tachycardic to ~118). ABG 7.11/49/116/16, BE -14, on bipap 14/5, 35% FiO2. Intubated on 1/26 for airway protection. Required CRRT on 1/26 for metabolic acidosis, now off.     Neuro:  Sedation: None  Pain: dilaudid 0.5mg PRN     CV:  - HDS  - Off pressors  - Lactate 3.4 --> 2.3 --> 2.6->2.2->1.8->2.1 (no longer trending)  - H/H drawn overnight 6.4/19.4; patient noting she felt fatigued. Given 1 unit pRBC. Will monitor results with am CBC.    Pulm:   - Extubated 1/28/19  - on 1 L NC  - prn CXR  - prn ABG    Renal:  Trend BUN/Cr: 23/0.6 (Cr downtrending)  Monitor Urine output (4.47 L over past 24 hours)  Nephro following, appreciate assistance    ID:  AF  1/24 blood cultures: gram positive cocci in clusters resembling staph  1/25 blood cultures: NGTD  1/25 operative cultures pending, ngtd thus far  - on cipro/metro/vanc  -wound vac removed by wound care yesterday; tolerated well      FEN/GI:  TPN @ 75  Replace lytes PRN  Last lactate 2.1; no longer trending  Received lasix 40 mg x2 yesterday  Ritchie in place, UOP  cc/hr overnight  Alk phos uptrending; 1183 up from 995 today  Tbili uptrending; 5.1 today up from 4.0  AST uptrending; 277 up from 100  ALT uptrending; 145 up from 71  -Abdominal U/s ordered per primary team; f/u results    Endo:  Patient with IDDM2, on detemir 20u BID at home  1/27-1/28 with hyperglycemia into the 500s on insulin gtt @ 100u/hr  1/28-1/29 with hypoglycemia requiring d10 infusion @ 125cc/hr  With introduction of TPN has now come off of D10 with glucose levels stable; continues on insulin gtt    Heme:  Trend H&H : H/H down to 6/4/19.4 overnight. Given 1 unit pRBC.  Post-op hgb 8.5   Plt up to 122 on most recent labs, heparin held  HIT panel negative  No signs of bleeding at this time    Dispo: Cont ICU care   Primary: SICU/general surgery            Critical care was time spent personally by me on the following activities: development of treatment plan with patient or surrogate and bedside caregivers, discussions with consultants, evaluation of patient's response to treatment, examination of patient, ordering and performing treatments and interventions, ordering and review of laboratory studies, ordering and review of radiographic studies, pulse oximetry, re-evaluation of patient's condition.  This critical care time did not overlap with that of any other provider or involve time for any procedures.     Chyna Ott MD  Critical Care - Surgery  [facility]

## 2019-02-01 NOTE — ASSESSMENT & PLAN NOTE
BG goal 140 - 180     Decrease Transition at 0.5 u/hr with stepdown parameters given steroid taper   BG monitoring every 4 hours and moderate dose correction scale.     Discharge planning: TBD

## 2019-02-01 NOTE — SUBJECTIVE & OBJECTIVE
Interval History/Significant Events: No acute events overnight. Wound vac removed at bedside yesterday; patient tolerated well. Received one unit platelets and one unit pRBC for low values yesterday; patient noting a general feeling of fatigue. Continues with diuresis (lasix 40 mg bid); good UOP overnight of  cc/hr.    Follow-up For: Procedure(s) (LRB):  LAPAROTOMY, EXPLORATORY (N/A)  INCISION AND DRAINAGE, ABSCESS-  drainage of pelvic abscess (N/A)  EXCISION, ABSCESS- drainage abdominal wall abscess (N/A)  APPLICATION, WOUND VAC (N/A)    Post-Operative Day: 7 Days Post-Op    Objective:     Vital Signs (Most Recent):  Temp: 97.9 °F (36.6 °C) (02/01/19 0700)  Pulse: 86 (02/01/19 0500)  Resp: 15 (02/01/19 0500)  BP: (!) 166/72 (02/01/19 0500)  SpO2: 100 % (02/01/19 0500) Vital Signs (24h Range):  Temp:  [97.7 °F (36.5 °C)-98.3 °F (36.8 °C)] 97.9 °F (36.6 °C)  Pulse:  [] 86  Resp:  [13-26] 15  SpO2:  [98 %-100 %] 100 %  BP: (143-178)/(71-99) 166/72     Weight: 71.2 kg (157 lb)  Body mass index is 29.66 kg/m².      Intake/Output Summary (Last 24 hours) at 2/1/2019 0807  Last data filed at 2/1/2019 0618  Gross per 24 hour   Intake 3590 ml   Output 4812 ml   Net -1222 ml       Physical Exam  Constitutional: She appears well-developed and well-nourished. No distress.   HENT:   Head: Normocephalic.   Eyes: Pupils are equal, round, and reactive to light.   Cardiovascular: Regular rate and rhythm.   Pulmonary/Chest: No respiratory distress. She has no wheezes.   Extubated on 1L NC.   Abdominal: Soft. She exhibits no distension.   Neurological:   lethargic   Skin: Skin is warm and dry.       Vents: Not on Ventilator.      Lines/Drains/Airways     Central Venous Catheter Line                 Port A Cath Single Lumen 01/23/19 1400 right subclavian 8 days         Trialysis (Dialysis) Catheter 01/26/19 1200 right internal jugular 5 days          Drain                 Closed/Suction Drain 01/25/19 1020 Right;Ventral  Abdomen Bulb 19 Fr. 6 days         Urethral Catheter 01/25/19 0934 Straight-tip;Non-latex 16 Fr. 6 days                Significant Labs:    CBC/Anemia Profile:  Recent Labs   Lab 01/31/19  0645 01/31/19 1957 02/01/19  0645   WBC 8.88 8.67 6.97   HGB 7.0* 6.4* 8.4*   HCT 20.5* 19.4* 24.2*   PLT 43* 122* 116*   MCV 79* 80* 81*   RDW 19.1* 18.6* 18.1*        Chemistries:  Recent Labs   Lab 01/31/19  0820 01/31/19  1626 01/31/19 1957 02/01/19  0121 02/01/19  0645     --  137 137 136   K 3.5  --  3.0* 3.0* 3.7     --  99 100 100   CO2 29  --  31* 27 31*   BUN 21  --  22 23 24*   CREATININE 0.7  --  0.7 0.6 0.6   CALCIUM 7.6*  --  7.6* 7.8* 8.0*   ALBUMIN 1.7*  --  1.7*  --  1.7*   PROT 4.0*  --  4.0*  --  4.2*   BILITOT 4.5*  --  4.0*  --  5.1*   ALKPHOS 996*  --  955*  --  1,183*   ALT 66*  --  71*  --  145*   *  --  100*  --  277*   MG 2.3 2.0  --  1.9  --    PHOS 3.3 2.9  --  2.7  --          Significant Imaging:  I have reviewed all pertinent imaging results/findings within the past 24 hours.

## 2019-02-01 NOTE — PLAN OF CARE
Problem: Adult Inpatient Plan of Care  Goal: Plan of Care Review  PMH: HTN, COPD, HF, DMII, CVA (2012; R-side weakness residual); hip fx; FTT; PE    DX: s/p ex-lap I&D R hemipelvis fluid collection 1/25 post-op hypotension/acidosis requiring fluid rescus.     1/26: SCUF overnight, electrolyte repletion, trending lactate. (9.6->6.9->6.2->6.0); restart TPN;   1/28: extubated; insulin gtt off; Vaso off; D10% gtt started; HIT panel sent        Nursing:   MAP >65    Q1 BG        Outcome: Ongoing (interventions implemented as appropriate)  POC reviewed with patient and daughter. Pt verbalized understanding. Questions and concerns addressed. No acute events overnight. Pt received 1 unit RBCs. Pt requesting frequent PRN pain medications for abdominal and general body pain. LUCIANO drain put out about 100 cc during the night. Incision site draining moderately. Pt diuresed during the night and responded well to lasix. Potassium replaced. Insulin infusion titrated down per nomogram. Pt progressing toward goals. Will continue to monitor.  See flow sheets for full assessment and VS info

## 2019-02-01 NOTE — ASSESSMENT & PLAN NOTE
- cards consulted for EKG changes, mild elevation in troponin, type II MI, on ASA/clopidogrel- now holding for thrombocytopenia which is improving

## 2019-02-01 NOTE — ASSESSMENT & PLAN NOTE
64 yo female presenting with abdominal pain, nausea, elevated lactate s/p open drainage pelvic abscess 1/25     -cont broad spec abx, f/u cultures- abdominal with bacteroides, blood cx from admit MRSA x2 plan for 2 week course vanc, cont cipro/flagyl plan for 4 week course  -npo for now  -wocn following, appreciate help with wounds/blisters, blisters appear to be 2/2 severe anasarca  -inc LFTs/ALP today, will check US to r/o cholecystitis, more likely 2/2 TPN cholestasis  -cont ICU care

## 2019-02-01 NOTE — SUBJECTIVE & OBJECTIVE
Interval History: HDS o/n. Transfused 1u prbcs with appropriate response Good UOP 4600 ml/24h with 40 lasix x2. Serous drainage from JPs. NPO for now. Afebrile.     Medications:  Continuous Infusions:   insulin (HUMAN R) infusion (adults) 0.8 Units/hr (02/01/19 0900)    TPN ADULT CENTRAL LINE CUSTOM 60 mL/hr at 02/01/19 0900    TPN ADULT CENTRAL LINE CUSTOM       Scheduled Meds:   albuterol-ipratropium  3 mL Nebulization Q6H    calcium gluconate IVPB  1,000 mg Intravenous Once    ciprofloxacin  400 mg Intravenous Q12H    fat emulsion 20%  250 mL Intravenous Daily    fat emulsion 20%  250 mL Intravenous Daily    fluticasone-vilanterol  1 puff Inhalation Daily    furosemide  40 mg Intravenous BID    hydrocortisone sodium succinate  12.5 mg Intravenous BID    metronidazole  500 mg Intravenous Q8H    pantoprozole (PROTONIX) IV  40 mg Intravenous Q12H    potassium chloride  20 mEq Intravenous Q2H    vancomycin (VANCOCIN) IVPB  750 mg Intravenous Q24H     PRN Meds:sodium chloride, sodium chloride, albuterol sulfate, dextrose 50%, dextrose 50%, diclofenac sodium, diphenhydrAMINE, glucagon (human recombinant), glucose, glucose, HYDROmorphone, insulin aspart U-100, ondansetron, promethazine (PHENERGAN) IVPB, sodium chloride 0.9%, sodium chloride 0.9%, white petrolatum     Review of patient's allergies indicates:   Allergen Reactions    Ace inhibitors Swelling    Carvedilol      Other reaction(s): Other (See Comments)  blister    Hydralazine analogues     Metformin Nausea And Vomiting    Tetracycline Itching    Tetracyclines Swelling    Travatan (with benzalkonium) [travoprost (benzalkonium)]     Travoprost Itching     Other reaction(s): Other (See Comments)  Blurry vision     Objective:     Vital Signs (Most Recent):  Temp: 97.9 °F (36.6 °C) (02/01/19 0700)  Pulse: 91 (02/01/19 0841)  Resp: 17 (02/01/19 0841)  BP: (!) 166/72 (02/01/19 0500)  SpO2: 100 % (02/01/19 0841) Vital Signs (24h Range):  Temp:   [97.7 °F (36.5 °C)-98.3 °F (36.8 °C)] 97.9 °F (36.6 °C)  Pulse:  [] 91  Resp:  [13-26] 17  SpO2:  [98 %-100 %] 100 %  BP: (145-178)/(71-99) 166/72     Weight: 71.2 kg (157 lb)  Body mass index is 29.66 kg/m².    Intake/Output - Last 3 Shifts       01/30 0700 - 01/31 0659 01/31 0700 - 02/01 0659 02/01 0700 - 02/02 0659    I.V. (mL/kg) 65.5 (0.9) 1424 (20) 2.4 (0)    Blood  243     IV Piggyback 400 258 200    TPN 1947.5 1665 242.4    Total Intake(mL/kg) 2413.1 (33.9) 3590 (50.4) 444.8 (6.2)    Urine (mL/kg/hr) 3270 (1.9) 4705 (2.8) 200 (1.1)    Drains 490 174 100    Other 225 50     Stool 0      Total Output 3985 4929 300    Net -1572 -1339 +144.8           Stool Occurrence 0 x            Physical Exam   Constitutional: No distress.   HENT:   Head: Normocephalic and atraumatic.   Pulmonary/Chest: Effort normal. No respiratory distress.   Abdominal:   Abdomen soft, midline incision intact  RLQ former incision abscess site clean  Area RLQ above incision with denuded skin (prev blister)   Neurological: She is alert.   Skin: Skin is warm and dry. She is not diaphoretic.   significant edema extremities    Significant Labs:  CBC:   Recent Labs   Lab 02/01/19  0645   WBC 6.97   RBC 2.99*   HGB 8.4*   HCT 24.2*   *   MCV 81*   MCH 28.1   MCHC 34.7     BMP:   Recent Labs   Lab 02/01/19  0121 02/01/19  0645   * 116*    136   K 3.0* 3.7    100   CO2 27 31*   BUN 23 24*   CREATININE 0.6 0.6   CALCIUM 7.8* 8.0*   MG 1.9  --      ABGs:   Recent Labs   Lab 01/28/19 2012   PH 7.296*   PCO2 50.8*   PO2 43   HCO3 24.8   POCSATURATED 73*   BE -2

## 2019-02-01 NOTE — PROGRESS NOTES
"[facility]  General Surgery  Progress Note    Subjective:     History of Present Illness:  64 yo W with a history of HTN, COPD on home oxygen, HFpEF, IDDMII, CVA on plavix, HTN, debility after fall and broken hip, and PE who presents to the ED with a several month history of nausea, vomiting, inability to tolerate a diet and weight loss. She has had multiple admissions in the past few months for different ailments. She presents today with continued nausea, vomiting and abdominal pain that is mostly worse in the RLQ. During the examination, she states her pain was all over her abdomen because she was retching so much. She states that she has lost close to 40lbs since her surgery and that she only able to keep broth down. She had a lap converted to open appendectomy back in August 2018 that was complicated by a wound infection, C diff and failure to thrive. This seems to persists. She is now wheelchair bound (per her) and apparently lives in [place] but is here in [place] with her daughter.    She had a CT scan in the ED which revealed an irregular shaped rim enhancing fluid collection measuring at least 4.0 x 3.0 cm ight hemipelvis at the site of prior appendectomy. Surgery was consulted for further recommendations. She was also noted to have a "knot" at the RLQ incision site that is also tender.    Post-Op Info:  Procedure(s) (LRB):  LAPAROTOMY, EXPLORATORY (N/A)  INCISION AND DRAINAGE, ABSCESS-  drainage of pelvic abscess (N/A)  EXCISION, ABSCESS- drainage abdominal wall abscess (N/A)  APPLICATION, WOUND VAC (N/A)   7 Days Post-Op     Interval History: HDS o/n. Transfused 1u prbcs with appropriate response Good UOP 4600 ml/24h with 40 lasix x2. Serous drainage from JPs. NPO for now. Afebrile.     Medications:  Continuous Infusions:   insulin (HUMAN R) infusion (adults) 0.8 Units/hr (02/01/19 0900)    TPN ADULT CENTRAL LINE CUSTOM 60 mL/hr at 02/01/19 0900    TPN ADULT CENTRAL LINE CUSTOM   "     Scheduled Meds:   albuterol-ipratropium  3 mL Nebulization Q6H    calcium gluconate IVPB  1,000 mg Intravenous Once    ciprofloxacin  400 mg Intravenous Q12H    fat emulsion 20%  250 mL Intravenous Daily    fat emulsion 20%  250 mL Intravenous Daily    fluticasone-vilanterol  1 puff Inhalation Daily    furosemide  40 mg Intravenous BID    hydrocortisone sodium succinate  12.5 mg Intravenous BID    metronidazole  500 mg Intravenous Q8H    pantoprozole (PROTONIX) IV  40 mg Intravenous Q12H    potassium chloride  20 mEq Intravenous Q2H    vancomycin (VANCOCIN) IVPB  750 mg Intravenous Q24H     PRN Meds:sodium chloride, sodium chloride, albuterol sulfate, dextrose 50%, dextrose 50%, diclofenac sodium, diphenhydrAMINE, glucagon (human recombinant), glucose, glucose, HYDROmorphone, insulin aspart U-100, ondansetron, promethazine (PHENERGAN) IVPB, sodium chloride 0.9%, sodium chloride 0.9%, white petrolatum     Review of patient's allergies indicates:   Allergen Reactions    Ace inhibitors Swelling    Carvedilol      Other reaction(s): Other (See Comments)  blister    Hydralazine analogues     Metformin Nausea And Vomiting    Tetracycline Itching    Tetracyclines Swelling    Travatan (with benzalkonium) [travoprost (benzalkonium)]     Travoprost Itching     Other reaction(s): Other (See Comments)  Blurry vision     Objective:     Vital Signs (Most Recent):  Temp: 97.9 °F (36.6 °C) (02/01/19 0700)  Pulse: 91 (02/01/19 0841)  Resp: 17 (02/01/19 0841)  BP: (!) 166/72 (02/01/19 0500)  SpO2: 100 % (02/01/19 0841) Vital Signs (24h Range):  Temp:  [97.7 °F (36.5 °C)-98.3 °F (36.8 °C)] 97.9 °F (36.6 °C)  Pulse:  [] 91  Resp:  [13-26] 17  SpO2:  [98 %-100 %] 100 %  BP: (145-178)/(71-99) 166/72     Weight: 71.2 kg (157 lb)  Body mass index is 29.66 kg/m².    Intake/Output - Last 3 Shifts       01/30 0700 - 01/31 0659 01/31 0700 - 02/01 0659 02/01 0700 - 02/02 0659    I.V. (mL/kg) 65.5 (0.9) 3514 (20)  2.4 (0)    Blood  243     IV Piggyback 400 258 200    TPN 1947.5 1665 242.4    Total Intake(mL/kg) 2413.1 (33.9) 3590 (50.4) 444.8 (6.2)    Urine (mL/kg/hr) 3270 (1.9) 4705 (2.8) 200 (1.1)    Drains 490 174 100    Other 225 50     Stool 0      Total Output 3985 4929 300    Net -1572 -1339 +144.8           Stool Occurrence 0 x            Physical Exam   Constitutional: No distress.   HENT:   Head: Normocephalic and atraumatic.   Pulmonary/Chest: Effort normal. No respiratory distress.   Abdominal:   Abdomen soft, midline incision intact  RLQ former incision abscess site clean  Area RLQ above incision with denuded skin (prev blister)   Neurological: She is alert.   Skin: Skin is warm and dry. She is not diaphoretic.   significant edema extremities    Significant Labs:  CBC:   Recent Labs   Lab 02/01/19  0645   WBC 6.97   RBC 2.99*   HGB 8.4*   HCT 24.2*   *   MCV 81*   MCH 28.1   MCHC 34.7     BMP:   Recent Labs   Lab 02/01/19  0121 02/01/19  0645   * 116*    136   K 3.0* 3.7    100   CO2 27 31*   BUN 23 24*   CREATININE 0.6 0.6   CALCIUM 7.8* 8.0*   MG 1.9  --      ABGs:   Recent Labs   Lab 01/28/19 2012   PH 7.296*   PCO2 50.8*   PO2 43   HCO3 24.8   POCSATURATED 73*   BE -2         Assessment/Plan:     Depression    Hold home meds     Hypophosphatemia    Replace prn     Debility    PT/OT     Hypokalemia    Replace prn     Generalized abdominal pain    62 yo female presenting with abdominal pain, nausea, elevated lactate s/p open drainage pelvic abscess 1/25     -cont broad spec abx, f/u cultures- abdominal with bacteroides, blood cx from admit MRSA x2 plan for 2 week course vanc, cont cipro/flagyl plan for 4 week course  -npo for now  -wocn following, appreciate help with wounds/blisters, blisters appear to be 2/2 severe anasarca  -inc LFTs/ALP today, will check US to r/o cholecystitis, more likely 2/2 TPN cholestasis  -cont ICU care     Moderate malnutrition    -cont TPN     Essential  hypertension    Hold home meds for now     Gastroesophageal reflux disease without esophagitis    ppi     (HFpEF) heart failure with preserved ejection fraction    Last echo 8/2018 with no WMAs, grade 1DD, EF 60  - strict I/O, daily weights  -cont diuresis today     Moderate asthma without complication    -sched duonebs     CAD (coronary artery disease)    - cards consulted for EKG changes, mild elevation in troponin, type II MI, on ASA/clopidogrel- now holding for thrombocytopenia which is improving     Insulin dependent diabetes mellitus    Insulin gtt         Sierra Real MD  General Surgery  [facility]

## 2019-02-01 NOTE — ASSESSMENT & PLAN NOTE
63 y.o. female with hx of asthma, COPD, CAD, DM, HTN, CVA 2012 with R side deficits, HFpEF, severe debility, lap converted to open appendectomy 8/2018 complicated by cdiff, failure to thrive.  Hx of difficult intubation and delayed emergence, CO2 narcosis needing ICU stay.  Hx of PRN O2 at night. Patient now s/p exploratory laparotomy, washout, and pelvic abscess drainage on 1/25/19.    Post-operatively, on initial evaluation in PACU, patient hypotensive to 60s/40s, on BiPap. Currently fluid resuscitating with 3rd liter of LR, responding well to phenylephrine, starting phenylephrine infusion (tachycardic to ~118). ABG 7.11/49/116/16, BE -14, on bipap 14/5, 35% FiO2. Intubated on 1/26 for airway protection. Required CRRT on 1/26 for metabolic acidosis, now off.     Neuro:  Sedation: None  Pain: dilaudid 0.5mg PRN     CV:  - HDS  - Off pressors  - Lactate 3.4 --> 2.3 --> 2.6->2.2->1.8->2.1 (no longer trending)  - H/H drawn overnight 6.4/19.4; patient noting she felt fatigued. Given 1 unit pRBC. Will monitor results with am CBC.    Pulm:   - Extubated 1/28/19  - on 1 L NC  - prn CXR  - prn ABG    Renal:  Trend BUN/Cr: 23/0.6 (Cr downtrending)  Monitor Urine output (4.47 L over past 24 hours)  Nephro following, appreciate assistance    ID:  AF  1/24 blood cultures: gram positive cocci in clusters resembling staph  1/25 blood cultures: NGTD  1/25 operative cultures pending, ngtd thus far  - on cipro/metro/vanc  -wound vac removed by wound care yesterday; tolerated well      FEN/GI:  TPN @ 75  Replace lytes PRN  Last lactate 2.1; no longer trending  Received lasix 40 mg x2 yesterday  Ritchie in place, UOP  cc/hr overnight  Alk phos uptrending; 1183 up from 995 today  Tbili uptrending; 5.1 today up from 4.0  AST uptrending; 277 up from 100  ALT uptrending; 145 up from 71  -Abdominal U/s ordered per primary team; f/u results    Endo:  Patient with IDDM2, on detemir 20u BID at home  1/27-1/28 with hyperglycemia into the  500s on insulin gtt @ 100u/hr  1/28-1/29 with hypoglycemia requiring d10 infusion @ 125cc/hr  With introduction of TPN has now come off of D10 with glucose levels stable; continues on insulin gtt    Heme:  Trend H&H : H/H down to 6/4/19.4 overnight. Given 1 unit pRBC.  Post-op hgb 8.5   Plt up to 122 on most recent labs, heparin held  HIT panel negative  No signs of bleeding at this time    Dispo: Cont ICU care   Primary: SICU/general surgery

## 2019-02-01 NOTE — SUBJECTIVE & OBJECTIVE
"Interval HPI:   Overnight events: remains in ICU. BG at or slightly below goal on insulin infusion 0.8-1.1 u/hr. Hydrocortisone 12.5 mg BID; taper per primary.   Eating:   NPO  Nausea: No  Hypoglycemia and intervention: No  Fever: No  TPN 60 cc/hr.       BP (!) 166/72   Pulse 91   Temp 97.9 °F (36.6 °C) (Oral)   Resp 17   Ht 5' 1" (1.549 m)   Wt 71.2 kg (157 lb)   LMP  (LMP Unknown)   SpO2 100%   Breastfeeding? No   BMI 29.66 kg/m²     Labs Reviewed and Include    Recent Labs   Lab 02/01/19  0645   *   CALCIUM 8.0*   ALBUMIN 1.7*   PROT 4.2*      K 3.7   CO2 31*      BUN 24*   CREATININE 0.6   ALKPHOS 1,183*   *   *   BILITOT 5.1*     Lab Results   Component Value Date    WBC 6.97 02/01/2019    HGB 8.4 (L) 02/01/2019    HCT 24.2 (L) 02/01/2019    MCV 81 (L) 02/01/2019     (L) 02/01/2019     No results for input(s): TSH, FREET4 in the last 168 hours.  Lab Results   Component Value Date    HGBA1C 4.7 01/24/2019       Nutritional status:   Body mass index is 29.66 kg/m².  Lab Results   Component Value Date    ALBUMIN 1.7 (L) 02/01/2019    ALBUMIN 1.7 (L) 01/31/2019    ALBUMIN 1.7 (L) 01/31/2019     Lab Results   Component Value Date    PREALBUMIN 6 (L) 01/31/2019    PREALBUMIN 3 (L) 01/28/2019    PREALBUMIN 5 (L) 01/24/2019       Estimated Creatinine Clearance: 86.7 mL/min (based on SCr of 0.6 mg/dL).    Accu-Checks  Recent Labs     01/31/19  0208 01/31/19  0418 01/31/19  0549 01/31/19  0643 01/31/19  0721 01/31/19  0802 01/31/19  0902 01/31/19  1226 01/31/19  1625 01/31/19 2001   POCTGLUCOSE 223* 204* 193* 165* 143* 156* 164* 171* 124* 113*       Current Medications and/or Treatments Impacting Glycemic Control  Immunotherapy:    Immunosuppressants     None        Steroids:   Hormones (From admission, onward)    Start     Stop Route Frequency Ordered    02/01/19 0900  hydrocortisone sodium succinate injection 12.5 mg      02/02 2059 IV 2 times daily 02/01/19 0836    "     Pressors:    Autonomic Drugs (From admission, onward)    None        Hyperglycemia/Diabetes Medications:   Antihyperglycemics (From admission, onward)    Start     Stop Route Frequency Ordered    01/31/19 1045  insulin regular (Humulin R) 100 Units in sodium chloride 0.9% 100 mL infusion      -- IV Continuous 01/31/19 0941    01/31/19 1040  insulin aspart U-100 pen 0-10 Units      -- SubQ As needed (PRN) 01/31/19 0905

## 2019-02-01 NOTE — PROGRESS NOTES
"[facility]  Endocrinology  Progress Note    Admit Date: 1/23/2019     Reason for Consult: Management of T2DM, Hyperglycemia     Surgical Procedure and Date:      LAPAROTOMY, EXPLORATORY (N/A)     INCISION AND DRAINAGE, ABSCESS-  drainage of pelvic abscess (N/A)     EXCISION, ABSCESS- drainage abdominal wall abscess (N/A)     APPLICATION, WOUND VAC (N/A)       Diabetes diagnosis year: 20 years ago    Home Diabetes Medications:       Levemir 20 units BID     Humalog 16 units TIDWM with correction     How often checking glucose at home? >4 x day   BG readings on regimen: 110's  Hypoglycemia on the regimen?  Yes - 40's  Missed doses on regimen?  No    Diabetes Complications include:     Hypoglycemia     Complicating diabetes co morbidities:   HLD, CVA, and CAD    Lab Results   Component Value Date    HGBA1C 4.7 01/24/2019       HPI:   Patient is a 63 y.o. female with a diagnosis of sepsis and lactic acidosis secondary to abdominal abscess. Also has diagnosis of COPD, asthma, CAD, CVA (2012), and DM2. Patient receives primary care for DM in [place]. Her primary care giver is her daughter. Patient's DM is well controlled by primary Endocrinologist in [place]. Patient has not been on insulin regimen for the past 3 weeks leading up to hospitalization. According to daughter, patient was not eating, and having low BG reading. Therefore, insulin regimen was stopped by patient's primary Endocrinologist. [place] [facility] [place] manage DM/hyperglycemia.             Interval HPI:   Overnight events: Remains in ICU. NAEON. BG at or slightly above goal on insulin infusion rates 1.4-2.5 u/hr. Hydrocortisone tapered to 25 mg BID.   Eating:   NPO  Nausea: No  Hypoglycemia and intervention: No  Fever: No  TPN and/or TF: No    BP (!) 143/82   Pulse 99   Temp 98 °F (36.7 °C) (Oral)   Resp 19   Ht 5' 1" (1.549 m)   Wt 71.2 kg (157 lb)   LMP  (LMP Unknown)   SpO2 100%   Breastfeeding? No   BMI 29.66 " kg/m²      Labs Reviewed and Include    Recent Labs   Lab 01/30/19  2345   *   CALCIUM 7.5*   ALBUMIN 1.6*   PROT 3.9*   *   K 3.9   CO2 26      BUN 17   CREATININE 0.7   ALKPHOS 779*   ALT 51*   AST 67*   BILITOT 4.0*     Lab Results   Component Value Date    WBC 8.88 01/31/2019    HGB 7.0 (L) 01/31/2019    HCT 20.5 (L) 01/31/2019    MCV 79 (L) 01/31/2019    PLT 43 (L) 01/31/2019     No results for input(s): TSH, FREET4 in the last 168 hours.  Lab Results   Component Value Date    HGBA1C 4.7 01/24/2019       Nutritional status:   Body mass index is 29.66 kg/m².  Lab Results   Component Value Date    ALBUMIN 1.6 (L) 01/30/2019    ALBUMIN 1.6 (L) 01/30/2019    ALBUMIN 1.8 (L) 01/30/2019     Lab Results   Component Value Date    PREALBUMIN 6 (L) 01/31/2019    PREALBUMIN 3 (L) 01/28/2019    PREALBUMIN 5 (L) 01/24/2019       Estimated Creatinine Clearance: 74.3 mL/min (based on SCr of 0.7 mg/dL).    Accu-Checks  Recent Labs     01/30/19  1754 01/30/19  2025 01/30/19  2205 01/30/19  2259 01/30/19  2345 01/31/19  0208 01/31/19  0418 01/31/19  0549 01/31/19  0643 01/31/19  0721   POCTGLUCOSE 252* 262* 330* 253* 243* 223* 204* 193* 165* 143*       Current Medications and/or Treatments Impacting Glycemic Control  Immunotherapy:    Immunosuppressants     None        Steroids:   Hormones (From admission, onward)    Start     Stop Route Frequency Ordered    01/31/19 0900  hydrocortisone sodium succinate injection 25 mg      -- IV 2 times daily 01/31/19 0728        Pressors:    Autonomic Drugs (From admission, onward)    None        Hyperglycemia/Diabetes Medications:   Antihyperglycemics (From admission, onward)    Start     Stop Route Frequency Ordered    01/30/19 1022  insulin regular (Humulin R) 100 Units in sodium chloride 0.9% 100 mL infusion     Question:  Insulin Rate Adjustment (DO NOT MODIFY ANSWER)  Answer:  [email or web address]    -- IV  Continuous 01/30/19 1023          ASSESSMENT and PLAN    * Pelvic abscess in female    Managed per primary team  Avoid hypoglycemia  Optimize BG control to improve wound healing         Insulin dependent diabetes mellitus    BG goal 140 - 180     Transition at 1.4 u/hr with stepdown parameters  BG monitoring every 4 hours and moderate dose correction scale.     ADDENDUM: rewrite insulin infusion at 1.1 u/hr     Discharge planning: TBD         CAD (coronary artery disease)    Managed per primary team  Condition may cause insulin resistance          Adverse effect of adrenal cortical steroids, sequela    On steroid therapy per primary team; may elevate BG readings         Overweight (BMI 25.0-29.9)    Body mass index is 29.66 kg/m².  may contribute to insulin resistance             Marta Sandoval NP  Endocrinology  [facility]

## 2019-02-01 NOTE — PHYSICIAN QUERY
"PT Name: Sheryl Martines  MR #: [id number]    Physician Query Form - Hematology Clarification      CDS/: JUDY [email or web address]          Contact information:[email or web address]    This form is a permanent document in the medical record.      Query Date: February 1, 2019    By submitting this query, we are merely seeking further clarification of documentation. Please utilize your independent clinical judgment when addressing the question(s) below.    The Medical record contains the following:   Indicators  Supporting Clinical Findings Location in Medical Record   X "Anemia" documented Anemia Anesthesia note 1/23   X H & H = Hgb=6.4-11.4  Hct=19.4-35.2 LAB 1/23-2/1    BP =                     HR=      "GI bleeding" documented      Acute bleeding (Non GI site)     X Transfusion(s) S/p total 11L fluid resuscitation. 1 unit PRBC's Plan of care note 1/26@731am    Treatment:     X Other:  Iron=53  TIBC=83  Saturated Iron=64  Transferrin=56  Kovesvlp=605 LAB 1/24     Provider, please specify diagnosis or diagnoses associated with above clinical findings.    [x  ] Iron deficiency anemia     [  ] Other Hematological Diagnosis (please specify):     [  ] Clinically Undetermined       Please document in your progress notes daily for the duration of treatment, until resolved, and include in your discharge summary.                                                                                                      "

## 2019-02-02 PROBLEM — A41.9 SEVERE SEPSIS: Status: RESOLVED | Noted: 2019-01-24 | Resolved: 2019-02-02

## 2019-02-02 PROBLEM — E86.0 DEHYDRATION: Status: RESOLVED | Noted: 2019-01-24 | Resolved: 2019-02-02

## 2019-02-02 PROBLEM — E87.6 HYPOKALEMIA: Status: RESOLVED | Noted: 2019-01-24 | Resolved: 2019-02-02

## 2019-02-02 PROBLEM — R65.20 SEVERE SEPSIS: Status: RESOLVED | Noted: 2019-01-24 | Resolved: 2019-02-02

## 2019-02-02 LAB
ALBUMIN SERPL BCP-MCNC: 1.6 G/DL
ALBUMIN SERPL BCP-MCNC: 1.7 G/DL
ALP SERPL-CCNC: 1081 U/L
ALP SERPL-CCNC: 1083 U/L
ALT SERPL W/O P-5'-P-CCNC: 159 U/L
ALT SERPL W/O P-5'-P-CCNC: 166 U/L
ANION GAP SERPL CALC-SCNC: 5 MMOL/L
ANION GAP SERPL CALC-SCNC: 8 MMOL/L
AST SERPL-CCNC: 130 U/L
AST SERPL-CCNC: 163 U/L
BASOPHILS # BLD AUTO: 0 K/UL
BASOPHILS # BLD AUTO: 0.01 K/UL
BASOPHILS # BLD AUTO: 0.01 K/UL
BASOPHILS NFR BLD: 0 %
BASOPHILS NFR BLD: 0.1 %
BASOPHILS NFR BLD: 0.1 %
BILIRUB SERPL-MCNC: 4.2 MG/DL
BILIRUB SERPL-MCNC: 4.5 MG/DL
BUN SERPL-MCNC: 25 MG/DL
BUN SERPL-MCNC: 26 MG/DL
CA-I BLDV-SCNC: 1.06 MMOL/L
CA-I BLDV-SCNC: 1.08 MMOL/L
CA-I BLDV-SCNC: 1.08 MMOL/L
CALCIUM SERPL-MCNC: 7.9 MG/DL
CALCIUM SERPL-MCNC: 7.9 MG/DL
CHLORIDE SERPL-SCNC: 97 MMOL/L
CHLORIDE SERPL-SCNC: 98 MMOL/L
CO2 SERPL-SCNC: 34 MMOL/L
CO2 SERPL-SCNC: 35 MMOL/L
CREAT SERPL-MCNC: 0.6 MG/DL
CREAT SERPL-MCNC: 0.6 MG/DL
DIFFERENTIAL METHOD: ABNORMAL
EOSINOPHIL # BLD AUTO: 0.1 K/UL
EOSINOPHIL NFR BLD: 0.7 %
EOSINOPHIL NFR BLD: 1.1 %
EOSINOPHIL NFR BLD: 1.1 %
ERYTHROCYTE [DISTWIDTH] IN BLOOD BY AUTOMATED COUNT: 18.1 %
ERYTHROCYTE [DISTWIDTH] IN BLOOD BY AUTOMATED COUNT: 18.1 %
ERYTHROCYTE [DISTWIDTH] IN BLOOD BY AUTOMATED COUNT: 18.3 %
EST. GFR  (AFRICAN AMERICAN): >60 ML/MIN/1.73 M^2
EST. GFR  (AFRICAN AMERICAN): >60 ML/MIN/1.73 M^2
EST. GFR  (NON AFRICAN AMERICAN): >60 ML/MIN/1.73 M^2
EST. GFR  (NON AFRICAN AMERICAN): >60 ML/MIN/1.73 M^2
GLUCOSE SERPL-MCNC: 142 MG/DL
GLUCOSE SERPL-MCNC: 155 MG/DL
HCT VFR BLD AUTO: 24.6 %
HCT VFR BLD AUTO: 25 %
HCT VFR BLD AUTO: 25 %
HGB BLD-MCNC: 8.4 G/DL
IMM GRANULOCYTES # BLD AUTO: 0.13 K/UL
IMM GRANULOCYTES # BLD AUTO: 0.14 K/UL
IMM GRANULOCYTES # BLD AUTO: 0.14 K/UL
IMM GRANULOCYTES NFR BLD AUTO: 1.6 %
IMM GRANULOCYTES NFR BLD AUTO: 1.8 %
IMM GRANULOCYTES NFR BLD AUTO: 1.8 %
LYMPHOCYTES # BLD AUTO: 1.3 K/UL
LYMPHOCYTES # BLD AUTO: 1.4 K/UL
LYMPHOCYTES # BLD AUTO: 1.4 K/UL
LYMPHOCYTES NFR BLD: 15.1 %
LYMPHOCYTES NFR BLD: 17.5 %
LYMPHOCYTES NFR BLD: 17.5 %
MAGNESIUM SERPL-MCNC: 1.7 MG/DL
MAGNESIUM SERPL-MCNC: 1.9 MG/DL
MAGNESIUM SERPL-MCNC: 2.3 MG/DL
MCH RBC QN AUTO: 27.6 PG
MCH RBC QN AUTO: 27.6 PG
MCH RBC QN AUTO: 28.2 PG
MCHC RBC AUTO-ENTMCNC: 33.6 G/DL
MCHC RBC AUTO-ENTMCNC: 33.6 G/DL
MCHC RBC AUTO-ENTMCNC: 34.1 G/DL
MCV RBC AUTO: 82 FL
MCV RBC AUTO: 82 FL
MCV RBC AUTO: 83 FL
MONOCYTES # BLD AUTO: 0.6 K/UL
MONOCYTES NFR BLD: 6.6 %
MONOCYTES NFR BLD: 8 %
MONOCYTES NFR BLD: 8 %
NEUTROPHILS # BLD AUTO: 5.6 K/UL
NEUTROPHILS # BLD AUTO: 5.6 K/UL
NEUTROPHILS # BLD AUTO: 6.3 K/UL
NEUTROPHILS NFR BLD: 71.5 %
NEUTROPHILS NFR BLD: 71.5 %
NEUTROPHILS NFR BLD: 76 %
NRBC BLD-RTO: 1 /100 WBC
PHOSPHATE SERPL-MCNC: 1.8 MG/DL
PHOSPHATE SERPL-MCNC: 2 MG/DL
PHOSPHATE SERPL-MCNC: 2.3 MG/DL
PLATELET # BLD AUTO: 110 K/UL
PLATELET # BLD AUTO: 112 K/UL
PLATELET # BLD AUTO: 112 K/UL
PMV BLD AUTO: 10.4 FL
PMV BLD AUTO: 10.4 FL
PMV BLD AUTO: 11 FL
POCT GLUCOSE: 157 MG/DL (ref 70–110)
POCT GLUCOSE: 175 MG/DL (ref 70–110)
POCT GLUCOSE: 183 MG/DL (ref 70–110)
POCT GLUCOSE: 196 MG/DL (ref 70–110)
POCT GLUCOSE: 203 MG/DL (ref 70–110)
POTASSIUM SERPL-SCNC: 2.9 MMOL/L
POTASSIUM SERPL-SCNC: 4 MMOL/L
PROT SERPL-MCNC: 4.1 G/DL
PROT SERPL-MCNC: 4.2 G/DL
RBC # BLD AUTO: 2.98 M/UL
RBC # BLD AUTO: 3.04 M/UL
RBC # BLD AUTO: 3.04 M/UL
SODIUM SERPL-SCNC: 137 MMOL/L
SODIUM SERPL-SCNC: 140 MMOL/L
WBC # BLD AUTO: 7.83 K/UL
WBC # BLD AUTO: 7.83 K/UL
WBC # BLD AUTO: 8.34 K/UL

## 2019-02-02 PROCEDURE — 20000000 HC ICU ROOM

## 2019-02-02 PROCEDURE — B4185 PARENTERAL SOL 10 GM LIPIDS: HCPCS | Performed by: STUDENT IN AN ORGANIZED HEALTH CARE EDUCATION/TRAINING PROGRAM

## 2019-02-02 PROCEDURE — 63600175 PHARM REV CODE 636 W HCPCS: Performed by: NURSE PRACTITIONER

## 2019-02-02 PROCEDURE — A4217 STERILE WATER/SALINE, 500 ML: HCPCS | Performed by: STUDENT IN AN ORGANIZED HEALTH CARE EDUCATION/TRAINING PROGRAM

## 2019-02-02 PROCEDURE — C9113 INJ PANTOPRAZOLE SODIUM, VIA: HCPCS | Performed by: PHYSICIAN ASSISTANT

## 2019-02-02 PROCEDURE — 63600175 PHARM REV CODE 636 W HCPCS: Performed by: STUDENT IN AN ORGANIZED HEALTH CARE EDUCATION/TRAINING PROGRAM

## 2019-02-02 PROCEDURE — 99232 SBSQ HOSP IP/OBS MODERATE 35: CPT | Mod: ,,, | Performed by: NURSE PRACTITIONER

## 2019-02-02 PROCEDURE — 63600175 PHARM REV CODE 636 W HCPCS: Performed by: SURGERY

## 2019-02-02 PROCEDURE — 25000003 PHARM REV CODE 250: Performed by: SURGERY

## 2019-02-02 PROCEDURE — 99900035 HC TECH TIME PER 15 MIN (STAT)

## 2019-02-02 PROCEDURE — 94640 AIRWAY INHALATION TREATMENT: CPT

## 2019-02-02 PROCEDURE — 25000003 PHARM REV CODE 250: Performed by: NURSE PRACTITIONER

## 2019-02-02 PROCEDURE — 63600175 PHARM REV CODE 636 W HCPCS: Performed by: PHYSICIAN ASSISTANT

## 2019-02-02 PROCEDURE — S0030 INJECTION, METRONIDAZOLE: HCPCS | Performed by: SURGERY

## 2019-02-02 PROCEDURE — 83735 ASSAY OF MAGNESIUM: CPT | Mod: 91

## 2019-02-02 PROCEDURE — 25000003 PHARM REV CODE 250: Performed by: STUDENT IN AN ORGANIZED HEALTH CARE EDUCATION/TRAINING PROGRAM

## 2019-02-02 PROCEDURE — 94664 DEMO&/EVAL PT USE INHALER: CPT

## 2019-02-02 PROCEDURE — 82330 ASSAY OF CALCIUM: CPT | Mod: 91

## 2019-02-02 PROCEDURE — 25000242 PHARM REV CODE 250 ALT 637 W/ HCPCS: Performed by: SURGERY

## 2019-02-02 PROCEDURE — 84100 ASSAY OF PHOSPHORUS: CPT | Mod: 91

## 2019-02-02 PROCEDURE — 84100 ASSAY OF PHOSPHORUS: CPT

## 2019-02-02 PROCEDURE — 83735 ASSAY OF MAGNESIUM: CPT

## 2019-02-02 PROCEDURE — 99232 PR SUBSEQUENT HOSPITAL CARE,LEVL II: ICD-10-PCS | Mod: ,,, | Performed by: NURSE PRACTITIONER

## 2019-02-02 PROCEDURE — 94761 N-INVAS EAR/PLS OXIMETRY MLT: CPT

## 2019-02-02 PROCEDURE — 85025 COMPLETE CBC W/AUTO DIFF WBC: CPT

## 2019-02-02 PROCEDURE — 80053 COMPREHEN METABOLIC PANEL: CPT | Mod: 91

## 2019-02-02 PROCEDURE — 82330 ASSAY OF CALCIUM: CPT

## 2019-02-02 RX ORDER — MAGNESIUM SULFATE HEPTAHYDRATE 40 MG/ML
2 INJECTION, SOLUTION INTRAVENOUS ONCE
Status: COMPLETED | OUTPATIENT
Start: 2019-02-02 | End: 2019-02-02

## 2019-02-02 RX ORDER — POTASSIUM CHLORIDE 29.8 MG/ML
40 INJECTION INTRAVENOUS ONCE
Status: COMPLETED | OUTPATIENT
Start: 2019-02-02 | End: 2019-02-02

## 2019-02-02 RX ORDER — POTASSIUM CHLORIDE 29.8 MG/ML
40 INJECTION INTRAVENOUS ONCE
Status: COMPLETED | OUTPATIENT
Start: 2019-02-03 | End: 2019-02-02

## 2019-02-02 RX ORDER — POTASSIUM CHLORIDE 14.9 MG/ML
20 INJECTION INTRAVENOUS ONCE
Status: COMPLETED | OUTPATIENT
Start: 2019-02-02 | End: 2019-02-02

## 2019-02-02 RX ADMIN — FUROSEMIDE 40 MG: 10 INJECTION, SOLUTION INTRAVENOUS at 08:02

## 2019-02-02 RX ADMIN — HYDROMORPHONE HYDROCHLORIDE 0.5 MG: 1 INJECTION, SOLUTION INTRAMUSCULAR; INTRAVENOUS; SUBCUTANEOUS at 10:02

## 2019-02-02 RX ADMIN — PANTOPRAZOLE SODIUM 40 MG: 40 INJECTION, POWDER, LYOPHILIZED, FOR SOLUTION INTRAVENOUS at 08:02

## 2019-02-02 RX ADMIN — POTASSIUM CHLORIDE 40 MEQ: 29.8 INJECTION, SOLUTION INTRAVENOUS at 07:02

## 2019-02-02 RX ADMIN — HYDROMORPHONE HYDROCHLORIDE 0.5 MG: 1 INJECTION, SOLUTION INTRAMUSCULAR; INTRAVENOUS; SUBCUTANEOUS at 01:02

## 2019-02-02 RX ADMIN — POTASSIUM CHLORIDE 40 MEQ: 400 INJECTION, SOLUTION INTRAVENOUS at 11:02

## 2019-02-02 RX ADMIN — HYDROMORPHONE HYDROCHLORIDE 0.5 MG: 1 INJECTION, SOLUTION INTRAMUSCULAR; INTRAVENOUS; SUBCUTANEOUS at 08:02

## 2019-02-02 RX ADMIN — METRONIDAZOLE 500 MG: 500 INJECTION, SOLUTION INTRAVENOUS at 01:02

## 2019-02-02 RX ADMIN — HYDROMORPHONE HYDROCHLORIDE 0.5 MG: 1 INJECTION, SOLUTION INTRAMUSCULAR; INTRAVENOUS; SUBCUTANEOUS at 06:02

## 2019-02-02 RX ADMIN — IPRATROPIUM BROMIDE AND ALBUTEROL SULFATE 3 ML: .5; 3 SOLUTION RESPIRATORY (INHALATION) at 02:02

## 2019-02-02 RX ADMIN — CIPROFLOXACIN 400 MG: 2 INJECTION, SOLUTION INTRAVENOUS at 10:02

## 2019-02-02 RX ADMIN — MAGNESIUM SULFATE IN WATER 2 G: 40 INJECTION, SOLUTION INTRAVENOUS at 03:02

## 2019-02-02 RX ADMIN — POTASSIUM CHLORIDE: 2 INJECTION, SOLUTION, CONCENTRATE INTRAVENOUS at 10:02

## 2019-02-02 RX ADMIN — INSULIN ASPART 2 UNITS: 100 INJECTION, SOLUTION INTRAVENOUS; SUBCUTANEOUS at 07:02

## 2019-02-02 RX ADMIN — INSULIN ASPART 2 UNITS: 100 INJECTION, SOLUTION INTRAVENOUS; SUBCUTANEOUS at 04:02

## 2019-02-02 RX ADMIN — SOYBEAN OIL 250 ML: 20 INJECTION, SOLUTION INTRAVENOUS at 10:02

## 2019-02-02 RX ADMIN — IPRATROPIUM BROMIDE AND ALBUTEROL SULFATE 3 ML: .5; 3 SOLUTION RESPIRATORY (INHALATION) at 07:02

## 2019-02-02 RX ADMIN — FUROSEMIDE 40 MG: 10 INJECTION, SOLUTION INTRAVENOUS at 06:02

## 2019-02-02 RX ADMIN — SODIUM GLYCOLATE 20 MMOL: 216 INJECTION, SOLUTION INTRAVENOUS at 01:02

## 2019-02-02 RX ADMIN — METRONIDAZOLE 500 MG: 500 INJECTION, SOLUTION INTRAVENOUS at 06:02

## 2019-02-02 RX ADMIN — HYDROMORPHONE HYDROCHLORIDE 0.5 MG: 1 INJECTION, SOLUTION INTRAMUSCULAR; INTRAVENOUS; SUBCUTANEOUS at 05:02

## 2019-02-02 RX ADMIN — IPRATROPIUM BROMIDE AND ALBUTEROL SULFATE 3 ML: .5; 3 SOLUTION RESPIRATORY (INHALATION) at 08:02

## 2019-02-02 RX ADMIN — HYDROCORTISONE SODIUM SUCCINATE 12.5 MG: 100 INJECTION, POWDER, FOR SOLUTION INTRAMUSCULAR; INTRAVENOUS at 08:02

## 2019-02-02 RX ADMIN — FLUTICASONE FUROATE AND VILANTEROL TRIFENATATE 1 PUFF: 100; 25 POWDER RESPIRATORY (INHALATION) at 08:02

## 2019-02-02 RX ADMIN — POTASSIUM CHLORIDE 20 MEQ: 200 INJECTION, SOLUTION INTRAVENOUS at 05:02

## 2019-02-02 RX ADMIN — METRONIDAZOLE 500 MG: 500 INJECTION, SOLUTION INTRAVENOUS at 10:02

## 2019-02-02 RX ADMIN — VANCOMYCIN HYDROCHLORIDE 750 MG: 750 INJECTION, POWDER, LYOPHILIZED, FOR SOLUTION INTRAVENOUS at 11:02

## 2019-02-02 RX ADMIN — HYDROMORPHONE HYDROCHLORIDE 0.5 MG: 1 INJECTION, SOLUTION INTRAMUSCULAR; INTRAVENOUS; SUBCUTANEOUS at 02:02

## 2019-02-02 RX ADMIN — IPRATROPIUM BROMIDE AND ALBUTEROL SULFATE 3 ML: .5; 3 SOLUTION RESPIRATORY (INHALATION) at 12:02

## 2019-02-02 RX ADMIN — SODIUM CHLORIDE 0.5 UNITS/HR: 9 INJECTION, SOLUTION INTRAVENOUS at 07:02

## 2019-02-02 RX ADMIN — HYDROMORPHONE HYDROCHLORIDE 0.5 MG: 1 INJECTION, SOLUTION INTRAMUSCULAR; INTRAVENOUS; SUBCUTANEOUS at 12:02

## 2019-02-02 RX ADMIN — INSULIN ASPART 2 UNITS: 100 INJECTION, SOLUTION INTRAVENOUS; SUBCUTANEOUS at 12:02

## 2019-02-02 NOTE — ASSESSMENT & PLAN NOTE
BG goal 140 - 180     Continue Transition at 0.5 u/hr with stepdown parameters given steroid taper   BG monitoring every 4 hours and moderate dose correction scale.     Discharge planning: TBD

## 2019-02-02 NOTE — PROGRESS NOTES
"[facility]  General Surgery  Progress Note    Subjective:     History of Present Illness:  62 yo W with a history of HTN, COPD on home oxygen, HFpEF, IDDMII, CVA on plavix, HTN, debility after fall and broken hip, and PE who presents to the ED with a several month history of nausea, vomiting, inability to tolerate a diet and weight loss. She has had multiple admissions in the past few months for different ailments. She presents today with continued nausea, vomiting and abdominal pain that is mostly worse in the RLQ. During the examination, she states her pain was all over her abdomen because she was retching so much. She states that she has lost close to 40lbs since her surgery and that she only able to keep broth down. She had a lap converted to open appendectomy back in August 2018 that was complicated by a wound infection, C diff and failure to thrive. This seems to persists. She is now wheelchair bound (per her) and apparently lives in [place] but is here in [place] with her daughter.    She had a CT scan in the ED which revealed an irregular shaped rim enhancing fluid collection measuring at least 4.0 x 3.0 cm ight hemipelvis at the site of prior appendectomy. Surgery was consulted for further recommendations. She was also noted to have a "knot" at the RLQ incision site that is also tender.    Post-Op Info:  Procedure(s) (LRB):  LAPAROTOMY, EXPLORATORY (N/A)  INCISION AND DRAINAGE, ABSCESS-  drainage of pelvic abscess (N/A)  EXCISION, ABSCESS- drainage abdominal wall abscess (N/A)  APPLICATION, WOUND VAC (N/A)   8 Days Post-Op     Interval History: No acute events; HDS o/n. Continued good UOP with almost 7L over past 24h with BID lasix. Serous drainage from LUCIANO. Afebrile. Still NPO.    Medications:  Continuous Infusions:   insulin (HUMAN R) infusion (adults) 0.5 Units/hr (02/02/19 1130)    TPN ADULT CENTRAL LINE CUSTOM 60 mL/hr at 02/02/19 1130     Scheduled Meds:   albuterol-ipratropium "  3 mL Nebulization Q6H    ciprofloxacin  400 mg Intravenous Q12H    fluticasone-vilanterol  1 puff Inhalation Daily    furosemide  40 mg Intravenous BID    metronidazole  500 mg Intravenous Q8H    pantoprozole (PROTONIX) IV  40 mg Intravenous Q12H    sodium glyceroPHOSPHATE (GLYCOPHOS) IVPB  20 mmol Intravenous Once    vancomycin (VANCOCIN) IVPB  750 mg Intravenous Q24H     PRN Meds:sodium chloride, sodium chloride, albuterol sulfate, dextrose 50%, dextrose 50%, diclofenac sodium, diphenhydrAMINE, glucagon (human recombinant), glucose, glucose, HYDROmorphone, insulin aspart U-100, ondansetron, promethazine (PHENERGAN) IVPB, sodium chloride 0.9%, sodium chloride 0.9%, white petrolatum     Review of patient's allergies indicates:   Allergen Reactions    Ace inhibitors Swelling    Carvedilol      Other reaction(s): Other (See Comments)  blister    Hydralazine analogues     Metformin Nausea And Vomiting    Tetracycline Itching    Tetracyclines Swelling    Travatan (with benzalkonium) [travoprost (benzalkonium)]     Travoprost Itching     Other reaction(s): Other (See Comments)  Blurry vision     Objective:     Vital Signs (Most Recent):  Temp: 98.3 °F (36.8 °C) (02/02/19 0700)  Pulse: 95 (02/02/19 1000)  Resp: 13 (02/02/19 1000)  BP: (!) 144/72 (02/02/19 1000)  SpO2: 98 % (02/02/19 1000) Vital Signs (24h Range):  Temp:  [97.8 °F (36.6 °C)-98.5 °F (36.9 °C)] 98.3 °F (36.8 °C)  Pulse:  [] 95  Resp:  [9-29] 13  SpO2:  [91 %-100 %] 98 %  BP: (124-185)/() 144/72     Weight: 71.2 kg (157 lb)  Body mass index is 29.66 kg/m².    Intake/Output - Last 3 Shifts       01/31 0700 - 02/01 0659 02/01 0700 - 02/02 0659 02/02 0700 - 02/03 0659    I.V. (mL/kg) 1424 (20) 525.6 (7.4) 3 (0)    Blood 243      IV Piggyback     TPN 1665 1564.4 357    Total Intake(mL/kg) 3590 (50.4) 2890 (40.6) 1710 (24)    Urine (mL/kg/hr) 4705 (2.8) 6825 (4) 1100 (3.3)    Drains 174 596 260    Other 50      Stool        Total Output 4929 7421 1360    Net -1339 -4531 +350                 Physical Exam   Constitutional: No distress.   HENT:   Head: Normocephalic and atraumatic.   Pulmonary/Chest: Effort normal. No respiratory distress.   Abdominal:   Abdomen soft, midline incision intact  RLQ former incision abscess site clean  Area RLQ above incision with denuded skin (prev blister)   Neurological: She is alert.   Skin: Skin is warm and dry. She is not diaphoretic.   significant edema extremities    Significant Labs:  CBC:   Recent Labs   Lab 02/02/19  0736   WBC 7.83  7.83   RBC 3.04*  3.04*   HGB 8.4*  8.4*   HCT 25.0*  25.0*   *  112*   MCV 82  82   MCH 27.6  27.6   MCHC 33.6  33.6     BMP:   Recent Labs   Lab 02/02/19  0736   *      K 4.0   CL 98   CO2 34*   BUN 25*   CREATININE 0.6   CALCIUM 7.9*   MG 2.3     ABGs:   Recent Labs   Lab 01/28/19 2012   PH 7.296*   PCO2 50.8*   PO2 43   HCO3 24.8   POCSATURATED 73*   BE -2       Physical Exam      Assessment/Plan:     Depression    Hold home meds     Hypophosphatemia    Replace prn     Debility    PT/OT  SLP consulted     Generalized abdominal pain    62 yo female presenting with abdominal pain, nausea, elevated lactate s/p open drainage pelvic abscess 1/25     -cont broad spec abx, f/u cultures- abdominal with bacteroides, blood cx from admit MRSA x2 plan for 2 week course vanc, cont cipro/flagyl plan for 4 week course  -npo for now  -wocn following, appreciate help with wounds/blisters, blisters appear to be 2/2 severe anasarca  -US with few small gallstones and no evidence acute cholecystitis - LFT elevation likely 2/2 TPN  -cont ICU care     Moderate malnutrition    -cont TPN     Essential hypertension    Hold home meds for now     Gastroesophageal reflux disease without esophagitis    ppi     (HFpEF) heart failure with preserved ejection fraction    Last echo 8/2018 with no WMAs, grade 1DD, EF 60  - strict I/O, daily weights  -cont diuresis  today     Moderate asthma without complication    -sched duonebs     CAD (coronary artery disease)    - cards consulted for EKG changes, mild elevation in troponin, type II MI, on ASA/clopidogrel- now holding for thrombocytopenia which is improving     Insulin dependent diabetes mellitus    Insulin gtt  Switch to SSI when necessary         Cassie Choudhary MD  General Surgery  [facility]

## 2019-02-02 NOTE — SUBJECTIVE & OBJECTIVE
Interval History/Significant Events: No acute events overnight. H/H stable. Continues to have good UOP (75 cc/hr overnight) with bid lasix dosing. Currently off sedation, satting well on RA. Not requiring vasopressor support.      Follow-up For: Procedure(s) (LRB):  LAPAROTOMY, EXPLORATORY (N/A)  INCISION AND DRAINAGE, ABSCESS-  drainage of pelvic abscess (N/A)  EXCISION, ABSCESS- drainage abdominal wall abscess (N/A)  APPLICATION, WOUND VAC (N/A)    Post-Operative Day: 8 Days Post-Op    Objective:     Vital Signs (Most Recent):  Temp: 98.5 °F (36.9 °C) (02/02/19 0300)  Pulse: 93 (02/02/19 0615)  Resp: 15 (02/02/19 0615)  BP: (!) 157/71 (02/02/19 0600)  SpO2: 95 % (02/02/19 0615) Vital Signs (24h Range):  Temp:  [97.8 °F (36.6 °C)-98.5 °F (36.9 °C)] 98.5 °F (36.9 °C)  Pulse:  [79-97] 93  Resp:  [9-29] 15  SpO2:  [93 %-100 %] 95 %  BP: (124-185)/() 157/71     Weight: 71.2 kg (157 lb)  Body mass index is 29.66 kg/m².      Intake/Output Summary (Last 24 hours) at 2/2/2019 0731  Last data filed at 2/2/2019 0533  Gross per 24 hour   Intake 2658.37 ml   Output 7421 ml   Net -4762.63 ml       Physical Exam    Constitutional: She appears well-developed and well-nourished. No distress.   HENT:   Head: Normocephalic.   Eyes: Pupils are equal, round, and reactive to light.   Cardiovascular: Regular rate and rhythm.   Pulmonary/Chest: No respiratory distress. She has no wheezes.   Extubated on RA.   Abdominal: Soft. She exhibits no distension.   Neurological:   lethargic   Skin: Skin is warm and dry.       Vents: Not on Ventilator.      Lines/Drains/Airways     Central Venous Catheter Line                 Port A Cath Single Lumen 01/23/19 1400 right subclavian 9 days         Trialysis (Dialysis) Catheter 01/26/19 1200 right internal jugular 6 days          Drain                 Closed/Suction Drain 01/25/19 1020 Right;Ventral Abdomen Bulb 19 Fr. 7 days         Urethral Catheter 01/25/19 0934 Straight-tip;Non-latex 16 Fr.  7 days                Significant Labs:    CBC/Anemia Profile:  Recent Labs   Lab 01/31/19  1957 02/01/19  0645 02/01/19 2010   WBC 8.67 6.97 6.97   HGB 6.4* 8.4* 8.3*   HCT 19.4* 24.2* 24.3*   * 116* 106*   MCV 80* 81* 81*   RDW 18.6* 18.1* 17.8*        Chemistries:  Recent Labs   Lab 02/01/19  0121 02/01/19 0645 02/01/19  1200 02/01/19 2010 02/02/19  0129    136 137 137  --    K 3.0* 3.7 3.8 3.1*  --     100 99 98  --    CO2 27 31* 31* 36*  --    BUN 23 24* 24* 24*  --    CREATININE 0.6 0.6 0.6 0.6  --    CALCIUM 7.8* 8.0* 7.9* 7.8*  --    ALBUMIN  --  1.7* 1.7* 1.7*  --    PROT  --  4.2* 3.9* 4.0*  --    BILITOT  --  5.1* 5.1* 4.8*  --    ALKPHOS  --  1,183* 1,136* 1,149*  --    ALT  --  145* 158* 172*  --    AST  --  277* 277* 235*  --    MG 1.9  --  1.8  --  1.7   PHOS 2.7  --  2.3*  --  2.0*         Significant Imaging:  I have reviewed all pertinent imaging results/findings within the past 24 hours.

## 2019-02-02 NOTE — ASSESSMENT & PLAN NOTE
63 y.o. female with hx of asthma, COPD, CAD, DM, HTN, CVA 2012 with R side deficits, HFpEF, severe debility, lap converted to open appendectomy 8/2018 complicated by cdiff, failure to thrive.  Hx of difficult intubation and delayed emergence, CO2 narcosis needing ICU stay.  Hx of PRN O2 at night. Patient now s/p exploratory laparotomy, washout, and pelvic abscess drainage on 1/25/19.    Post-operatively, on initial evaluation in PACU, patient hypotensive to 60s/40s, on BiPap. Currently fluid resuscitating with 3rd liter of LR, responding well to phenylephrine, starting phenylephrine infusion (tachycardic to ~118). ABG 7.11/49/116/16, BE -14, on bipap 14/5, 35% FiO2. Intubated on 1/26 for airway protection. Extubated 1/28. Required CRRT on 1/26 for metabolic acidosis, now off.     Neuro:  Sedation: None  Pain: dilaudid 0.5mg PRN     CV:  - HDS  - Off pressors  - Lactate 3.4 --> 2.3 --> 2.6->2.2->1.8->2.1 (no longer trending)  - 1 unit pRBC 2/1  - 1 unit platelets 1/31  - H/H 8.4/24.6    Pulm:   - Extubated 1/28/19  - on RA  - prn CXR  - prn ABG    Renal:  Trend BUN/Cr: 26/0.6  Monitor Urine output (4.9L over past 24 hours)  Nephro following, appreciate assistance    ID:  AF  1/24 blood cultures: gram positive cocci in clusters resembling staph  1/25 blood cultures: NGTD  1/25 operative cultures pending, ngtd thus far  - on cipro/metro/vanc  -wound vac removed by wound care 1/31; tolerated well      FEN/GI:  TPN @ 75  Replace lytes PRN  Last lactate 2.1; no longer trending  Lasix 40 mg bid  Trend Alk phos, LFTs  -Abdominal U/S 2/1: few gallstones, no acute cholecystitis     Endo:  Patient with IDDM2, on detemir 20u BID at home  1/27-1/28 with hyperglycemia into the 500s on insulin gtt @ 100u/hr  1/28-1/29 with hypoglycemia requiring d10 infusion @ 125cc/hr  With introduction of TPN has now come off of D10 with glucose levels stable; continues on insulin gtt    Heme:  Post-op hgb 8.5   Plt up to 110 on most recent  labs, heparin held  HIT panel negative  No signs of bleeding at this time    Dispo: Cont ICU care. Stable for possible stepdown? Will discuss with primary team.  Primary: SICU/general surgery

## 2019-02-02 NOTE — SUBJECTIVE & OBJECTIVE
Interval History: No acute events; HDS o/n. Continued good UOP with almost 7L over past 24h with BID lasix. Serous drainage from LUCIANO. Afebrile. Still NPO.    Medications:  Continuous Infusions:   insulin (HUMAN R) infusion (adults) 0.5 Units/hr (02/02/19 1130)    TPN ADULT CENTRAL LINE CUSTOM 60 mL/hr at 02/02/19 1130     Scheduled Meds:   albuterol-ipratropium  3 mL Nebulization Q6H    ciprofloxacin  400 mg Intravenous Q12H    fluticasone-vilanterol  1 puff Inhalation Daily    furosemide  40 mg Intravenous BID    metronidazole  500 mg Intravenous Q8H    pantoprozole (PROTONIX) IV  40 mg Intravenous Q12H    sodium glyceroPHOSPHATE (GLYCOPHOS) IVPB  20 mmol Intravenous Once    vancomycin (VANCOCIN) IVPB  750 mg Intravenous Q24H     PRN Meds:sodium chloride, sodium chloride, albuterol sulfate, dextrose 50%, dextrose 50%, diclofenac sodium, diphenhydrAMINE, glucagon (human recombinant), glucose, glucose, HYDROmorphone, insulin aspart U-100, ondansetron, promethazine (PHENERGAN) IVPB, sodium chloride 0.9%, sodium chloride 0.9%, white petrolatum     Review of patient's allergies indicates:   Allergen Reactions    Ace inhibitors Swelling    Carvedilol      Other reaction(s): Other (See Comments)  blister    Hydralazine analogues     Metformin Nausea And Vomiting    Tetracycline Itching    Tetracyclines Swelling    Travatan (with benzalkonium) [travoprost (benzalkonium)]     Travoprost Itching     Other reaction(s): Other (See Comments)  Blurry vision     Objective:     Vital Signs (Most Recent):  Temp: 98.3 °F (36.8 °C) (02/02/19 0700)  Pulse: 95 (02/02/19 1000)  Resp: 13 (02/02/19 1000)  BP: (!) 144/72 (02/02/19 1000)  SpO2: 98 % (02/02/19 1000) Vital Signs (24h Range):  Temp:  [97.8 °F (36.6 °C)-98.5 °F (36.9 °C)] 98.3 °F (36.8 °C)  Pulse:  [] 95  Resp:  [9-29] 13  SpO2:  [91 %-100 %] 98 %  BP: (124-185)/() 144/72     Weight: 71.2 kg (157 lb)  Body mass index is 29.66 kg/m².    Intake/Output  - Last 3 Shifts       01/31 0700 - 02/01 0659 02/01 0700 - 02/02 0659 02/02 0700 - 02/03 0659    I.V. (mL/kg) 1424 (20) 525.6 (7.4) 3 (0)    Blood 243      IV Piggyback     TPN 1665 1564.4 357    Total Intake(mL/kg) 3590 (50.4) 2890 (40.6) 1710 (24)    Urine (mL/kg/hr) 4705 (2.8) 6825 (4) 1100 (3.3)    Drains 174 596 260    Other 50      Stool       Total Output 4929 7421 1360    Net -1339 -4531 +350                 Physical Exam   Constitutional: No distress.   HENT:   Head: Normocephalic and atraumatic.   Pulmonary/Chest: Effort normal. No respiratory distress.   Abdominal:   Abdomen soft, midline incision intact  RLQ former incision abscess site clean  Area RLQ above incision with denuded skin (prev blister)   Neurological: She is alert.   Skin: Skin is warm and dry. She is not diaphoretic.   significant edema extremities    Significant Labs:  CBC:   Recent Labs   Lab 02/02/19  0736   WBC 7.83  7.83   RBC 3.04*  3.04*   HGB 8.4*  8.4*   HCT 25.0*  25.0*   *  112*   MCV 82  82   MCH 27.6  27.6   MCHC 33.6  33.6     BMP:   Recent Labs   Lab 02/02/19  0736   *      K 4.0   CL 98   CO2 34*   BUN 25*   CREATININE 0.6   CALCIUM 7.9*   MG 2.3     ABGs:   Recent Labs   Lab 01/28/19 2012   PH 7.296*   PCO2 50.8*   PO2 43   HCO3 24.8   POCSATURATED 73*   BE -2       Physical Exam

## 2019-02-02 NOTE — ASSESSMENT & PLAN NOTE
BG goal 140 - 180     Discontinue IV insulin.  BG monitoring every 4 hours and moderate dose correction scale.     Discharge planning: TBD

## 2019-02-02 NOTE — PLAN OF CARE
Problem: Adult Inpatient Plan of Care  Goal: Patient-Specific Goal (Individualization)  Outcome: Ongoing (interventions implemented as appropriate)  Plan of care reviewed with patient and family @ 1400.  Insulin drip and  TPN maintained, IV ABX, electrolyte replacement, speech consult to eval. Swallowing, NPO, wound care to change abdominal dressing, LUCIANO drain draining serous fluid, nieves draining CL/Y, pain controlled with Dilaudid IV prn, Pt verbalized understanding. All questions and concerns addressed today.  Pt remains free from injury and fall.  No acute events today.  See flowsheets for full assessment and vitals throughout shift

## 2019-02-02 NOTE — SUBJECTIVE & OBJECTIVE
"Interval HPI:   Overnight events: Remains in ICU. NAEON. BG at or slightly above goal on insulin infusion at 0.5 u/hr; no correction scale given. Hydrocortisone 12.5 mg BID; steroid taper per primary.   Eating:   NPO  Nausea: No  Hypoglycemia and intervention: No  Fever: No  TPN  At 60 cc/hr       BP (!) 144/72 (BP Location: Right arm, Patient Position: Lying)   Pulse 95   Temp 98.3 °F (36.8 °C) (Oral)   Resp 13   Ht 5' 1" (1.549 m)   Wt 71.2 kg (157 lb)   LMP  (LMP Unknown)   SpO2 98%   Breastfeeding? No   BMI 29.66 kg/m²     Labs Reviewed and Include    Recent Labs   Lab 02/02/19  0736   *   CALCIUM 7.9*   ALBUMIN 1.7*   PROT 4.2*      K 4.0   CO2 34*   CL 98   BUN 25*   CREATININE 0.6   ALKPHOS 1,083*   *   *   BILITOT 4.5*     Lab Results   Component Value Date    WBC 7.83 02/02/2019    WBC 7.83 02/02/2019    HGB 8.4 (L) 02/02/2019    HGB 8.4 (L) 02/02/2019    HCT 25.0 (L) 02/02/2019    HCT 25.0 (L) 02/02/2019    MCV 82 02/02/2019    MCV 82 02/02/2019     (L) 02/02/2019     (L) 02/02/2019     No results for input(s): TSH, FREET4 in the last 168 hours.  Lab Results   Component Value Date    HGBA1C 4.7 01/24/2019       Nutritional status:   Body mass index is 29.66 kg/m².  Lab Results   Component Value Date    ALBUMIN 1.7 (L) 02/02/2019    ALBUMIN 1.7 (L) 02/01/2019    ALBUMIN 1.7 (L) 02/01/2019     Lab Results   Component Value Date    PREALBUMIN 6 (L) 01/31/2019    PREALBUMIN 3 (L) 01/28/2019    PREALBUMIN 5 (L) 01/24/2019       Estimated Creatinine Clearance: 86.7 mL/min (based on SCr of 0.6 mg/dL).    Accu-Checks  Recent Labs     01/31/19  0802 01/31/19  0902 01/31/19  1226 01/31/19  1625 01/31/19 2001 02/01/19  1053 02/01/19  1433 02/01/19 2016 02/02/19  0126 02/02/19  0814   POCTGLUCOSE 156* 164* 171* 124* 113* 144* 125* 221* 157* 175*       Current Medications and/or Treatments Impacting Glycemic Control  Immunotherapy:    Immunosuppressants     None    "     Steroids:   Hormones (From admission, onward)    None        Pressors:    Autonomic Drugs (From admission, onward)    None        Hyperglycemia/Diabetes Medications:   Antihyperglycemics (From admission, onward)    Start     Stop Route Frequency Ordered    01/31/19 1045  insulin regular (Humulin R) 100 Units in sodium chloride 0.9% 100 mL infusion      -- IV Continuous 01/31/19 0941    01/31/19 1040  insulin aspart U-100 pen 0-10 Units      -- SubQ As needed (PRN) 01/31/19 0941

## 2019-02-02 NOTE — ASSESSMENT & PLAN NOTE
62 yo female presenting with abdominal pain, nausea, elevated lactate s/p open drainage pelvic abscess 1/25     -cont broad spec abx, f/u cultures- abdominal with bacteroides, blood cx from admit MRSA x2 plan for 2 week course vanc, cont cipro/flagyl plan for 4 week course  -npo for now  -wocn following, appreciate help with wounds/blisters, blisters appear to be 2/2 severe anasarca  -US with few small gallstones and no evidence acute cholecystitis - LFT elevation likely 2/2 TPN  -cont ICU care

## 2019-02-02 NOTE — PROGRESS NOTES
"[facility]  Endocrinology  Progress Note    Admit Date: 1/23/2019     Reason for Consult: Management of T2DM, Hyperglycemia     Surgical Procedure and Date:      LAPAROTOMY, EXPLORATORY (N/A)     INCISION AND DRAINAGE, ABSCESS-  drainage of pelvic abscess (N/A)     EXCISION, ABSCESS- drainage abdominal wall abscess (N/A)     APPLICATION, WOUND VAC (N/A)       Diabetes diagnosis year: 20 years ago    Home Diabetes Medications:       Levemir 20 units BID     Humalog 16 units TIDWM with correction     How often checking glucose at home? >4 x day   BG readings on regimen: 110's  Hypoglycemia on the regimen?  Yes - 40's  Missed doses on regimen?  No    Diabetes Complications include:     Hypoglycemia     Complicating diabetes co morbidities:   HLD, CVA, and CAD    Lab Results   Component Value Date    HGBA1C 4.7 01/24/2019       HPI:   Patient is a 63 y.o. female with a diagnosis of sepsis and lactic acidosis secondary to abdominal abscess. Also has diagnosis of COPD, asthma, CAD, CVA (2012), and DM2. Patient receives primary care for DM in [place]. Her primary care giver is her daughter. Patient's DM is well controlled by primary Endocrinologist in [place]. Patient has not been on insulin regimen for the past 3 weeks leading up to hospitalization. According to daughter, patient was not eating, and having low BG reading. Therefore, insulin regimen was stopped by patient's primary Endocrinologist. Endocrinology at [facility] consulted to manage DM/hyperglycemia.             Interval HPI:   Overnight events: Remains in ICU. NAEON. BG at or slightly above goal on insulin infusion at 0.5 u/hr; no correction scale given. Hydrocortisone 12.5 mg BID; steroid taper per primary.   Eating:   NPO  Nausea: No  Hypoglycemia and intervention: No  Fever: No  TPN  At 60 cc/hr       BP (!) 144/72 (BP Location: Right arm, Patient Position: Lying)   Pulse 95   Temp 98.3 °F (36.8 °C) (Oral)   Resp 13   Ht 5' 1" (1.549 m)   " Wt 71.2 kg (157 lb)   LMP  (LMP Unknown)   SpO2 98%   Breastfeeding? No   BMI 29.66 kg/m²      Labs Reviewed and Include    Recent Labs   Lab 02/02/19  0736   *   CALCIUM 7.9*   ALBUMIN 1.7*   PROT 4.2*      K 4.0   CO2 34*   CL 98   BUN 25*   CREATININE 0.6   ALKPHOS 1,083*   *   *   BILITOT 4.5*     Lab Results   Component Value Date    WBC 7.83 02/02/2019    WBC 7.83 02/02/2019    HGB 8.4 (L) 02/02/2019    HGB 8.4 (L) 02/02/2019    HCT 25.0 (L) 02/02/2019    HCT 25.0 (L) 02/02/2019    MCV 82 02/02/2019    MCV 82 02/02/2019     (L) 02/02/2019     (L) 02/02/2019     No results for input(s): TSH, FREET4 in the last 168 hours.  Lab Results   Component Value Date    HGBA1C 4.7 01/24/2019       Nutritional status:   Body mass index is 29.66 kg/m².  Lab Results   Component Value Date    ALBUMIN 1.7 (L) 02/02/2019    ALBUMIN 1.7 (L) 02/01/2019    ALBUMIN 1.7 (L) 02/01/2019     Lab Results   Component Value Date    PREALBUMIN 6 (L) 01/31/2019    PREALBUMIN 3 (L) 01/28/2019    PREALBUMIN 5 (L) 01/24/2019       Estimated Creatinine Clearance: 86.7 mL/min (based on SCr of 0.6 mg/dL).    Accu-Checks  Recent Labs     01/31/19  0802 01/31/19  0902 01/31/19  1226 01/31/19  1625 01/31/19 2001 02/01/19  1053 02/01/19  1433 02/01/19  2016 02/02/19  0126 02/02/19  0814   POCTGLUCOSE 156* 164* 171* 124* 113* 144* 125* 221* 157* 175*       Current Medications and/or Treatments Impacting Glycemic Control  Immunotherapy:    Immunosuppressants     None        Steroids:   Hormones (From admission, onward)    None        Pressors:    Autonomic Drugs (From admission, onward)    None        Hyperglycemia/Diabetes Medications:   Antihyperglycemics (From admission, onward)    Start     Stop Route Frequency Ordered    01/31/19 1045  insulin regular (Humulin R) 100 Units in sodium chloride 0.9% 100 mL infusion      -- IV Continuous 01/31/19 0941    01/31/19 1040  insulin aspart U-100 pen 0-10 Units       -- SubQ As needed (PRN) 01/31/19 0941          ASSESSMENT and PLAN    * Pelvic abscess in female    Managed per primary team  Avoid hypoglycemia  Optimize BG control to improve wound healing         Insulin dependent diabetes mellitus    BG goal 140 - 180     Continue Transition at 0.5 u/hr with stepdown parameters given steroid taper   BG monitoring every 4 hours and moderate dose correction scale.     Discharge planning: TBD         CAD (coronary artery disease)    Managed per primary team  Condition may cause insulin resistance          Adverse effect of adrenal cortical steroids, sequela    On steroid therapy per primary team; may elevate BG readings         Overweight (BMI 25.0-29.9)    Body mass index is 29.66 kg/m².  may contribute to insulin resistance             Marta Sandoval NP  Endocrinology  [facility]

## 2019-02-02 NOTE — PROGRESS NOTES
"[facility]  Endocrinology  Progress Note    Admit Date: 1/23/2019     Reason for Consult: Management of T2DM, Hyperglycemia     Surgical Procedure and Date:      LAPAROTOMY, EXPLORATORY (N/A)     INCISION AND DRAINAGE, ABSCESS-  drainage of pelvic abscess (N/A)     EXCISION, ABSCESS- drainage abdominal wall abscess (N/A)     APPLICATION, WOUND VAC (N/A)       Diabetes diagnosis year: 20 years ago    Home Diabetes Medications:       Levemir 20 units BID     Humalog 16 units TIDWM with correction     How often checking glucose at home? >4 x day   BG readings on regimen: 110's  Hypoglycemia on the regimen?  Yes - 40's  Missed doses on regimen?  No    Diabetes Complications include:     Hypoglycemia     Complicating diabetes co morbidities:   HLD, CVA, and CAD    Lab Results   Component Value Date    HGBA1C 4.7 01/24/2019       HPI:   Patient is a 63 y.o. female with a diagnosis of sepsis and lactic acidosis secondary to abdominal abscess. Also has diagnosis of COPD, asthma, CAD, CVA (2012), and DM2. Patient receives primary care for DM in [place]. Her primary care giver is her daughter. Patient's DM is well controlled by primary Endocrinologist in [place]. Patient has not been on insulin regimen for the past 3 weeks leading up to hospitalization. According to daughter, patient was not eating, and having low BG reading. Therefore, insulin regimen was stopped by patient's primary Endocrinologist. Endocrinology at [facility] consulted to manage DM/hyperglycemia.             Interval HPI:   Overnight events: remains in ICU. BG at or slightly below goal on insulin infusion 0.8-1.1 u/hr. Hydrocortisone 12.5 mg BID; taper per primary.   Eating:   NPO  Nausea: No  Hypoglycemia and intervention: No  Fever: No  TPN 60 cc/hr.       BP (!) 166/72   Pulse 91   Temp 97.9 °F (36.6 °C) (Oral)   Resp 17   Ht 5' 1" (1.549 m)   Wt 71.2 kg (157 lb)   LMP  (LMP Unknown)   SpO2 100%   Breastfeeding? No   BMI 29.66 " kg/m²      Labs Reviewed and Include    Recent Labs   Lab 02/01/19  0645   *   CALCIUM 8.0*   ALBUMIN 1.7*   PROT 4.2*      K 3.7   CO2 31*      BUN 24*   CREATININE 0.6   ALKPHOS 1,183*   *   *   BILITOT 5.1*     Lab Results   Component Value Date    WBC 6.97 02/01/2019    HGB 8.4 (L) 02/01/2019    HCT 24.2 (L) 02/01/2019    MCV 81 (L) 02/01/2019     (L) 02/01/2019     No results for input(s): TSH, FREET4 in the last 168 hours.  Lab Results   Component Value Date    HGBA1C 4.7 01/24/2019       Nutritional status:   Body mass index is 29.66 kg/m².  Lab Results   Component Value Date    ALBUMIN 1.7 (L) 02/01/2019    ALBUMIN 1.7 (L) 01/31/2019    ALBUMIN 1.7 (L) 01/31/2019     Lab Results   Component Value Date    PREALBUMIN 6 (L) 01/31/2019    PREALBUMIN 3 (L) 01/28/2019    PREALBUMIN 5 (L) 01/24/2019       Estimated Creatinine Clearance: 86.7 mL/min (based on SCr of 0.6 mg/dL).    Accu-Checks  Recent Labs     01/31/19  0208 01/31/19  0418 01/31/19  0549 01/31/19  0643 01/31/19  0721 01/31/19  0802 01/31/19  0902 01/31/19  1226 01/31/19  1625 01/31/19 2001   POCTGLUCOSE 223* 204* 193* 165* 143* 156* 164* 171* 124* 113*       Current Medications and/or Treatments Impacting Glycemic Control  Immunotherapy:    Immunosuppressants     None        Steroids:   Hormones (From admission, onward)    Start     Stop Route Frequency Ordered    02/01/19 0900  hydrocortisone sodium succinate injection 12.5 mg      02/02 2059 IV 2 times daily 02/01/19 0836        Pressors:    Autonomic Drugs (From admission, onward)    None        Hyperglycemia/Diabetes Medications:   Antihyperglycemics (From admission, onward)    Start     Stop Route Frequency Ordered    01/31/19 1045  insulin regular (Humulin R) 100 Units in sodium chloride 0.9% 100 mL infusion      -- IV Continuous 01/31/19 0941    01/31/19 1040  insulin aspart U-100 pen 0-10 Units      -- SubQ As needed (PRN) 01/31/19 0941           ASSESSMENT and PLAN    * Pelvic abscess in female    Managed per primary team  Avoid hypoglycemia  Optimize BG control to improve wound healing         Insulin dependent diabetes mellitus    BG goal 140 - 180     Decrease Transition at 0.5 u/hr with stepdown parameters given steroid taper   BG monitoring every 4 hours and moderate dose correction scale.     Discharge planning: TBD         CAD (coronary artery disease)    Managed per primary team  Condition may cause insulin resistance          Adverse effect of adrenal cortical steroids, sequela    On steroid therapy per primary team; may elevate BG readings         Overweight (BMI 25.0-29.9)    Body mass index is 29.66 kg/m².  may contribute to insulin resistance             Marta Sandoval NP  Endocrinology  [facility]

## 2019-02-03 PROBLEM — N17.9 AKI (ACUTE KIDNEY INJURY): Status: RESOLVED | Noted: 2019-01-26 | Resolved: 2019-02-03

## 2019-02-03 PROBLEM — Z78.9 ON TOTAL PARENTERAL NUTRITION (TPN): Status: ACTIVE | Noted: 2019-02-03

## 2019-02-03 LAB
ALBUMIN SERPL BCP-MCNC: 1.5 G/DL
ALBUMIN SERPL BCP-MCNC: 1.6 G/DL
ALP SERPL-CCNC: 1070 U/L
ALP SERPL-CCNC: 1098 U/L
ALT SERPL W/O P-5'-P-CCNC: 146 U/L
ALT SERPL W/O P-5'-P-CCNC: 148 U/L
ANION GAP SERPL CALC-SCNC: 7 MMOL/L
ANION GAP SERPL CALC-SCNC: 7 MMOL/L
AST SERPL-CCNC: 126 U/L
AST SERPL-CCNC: 154 U/L
BASOPHILS # BLD AUTO: 0.01 K/UL
BASOPHILS # BLD AUTO: 0.01 K/UL
BASOPHILS NFR BLD: 0.1 %
BASOPHILS NFR BLD: 0.1 %
BILIRUB SERPL-MCNC: 4 MG/DL
BILIRUB SERPL-MCNC: 4.4 MG/DL
BUN SERPL-MCNC: 27 MG/DL
BUN SERPL-MCNC: 29 MG/DL
CA-I BLDV-SCNC: 1.04 MMOL/L
CA-I BLDV-SCNC: 1.04 MMOL/L
CA-I BLDV-SCNC: 1.06 MMOL/L
CA-I BLDV-SCNC: 1.06 MMOL/L
CALCIUM SERPL-MCNC: 8 MG/DL
CALCIUM SERPL-MCNC: 8.1 MG/DL
CHLORIDE SERPL-SCNC: 98 MMOL/L
CHLORIDE SERPL-SCNC: 99 MMOL/L
CO2 SERPL-SCNC: 35 MMOL/L
CO2 SERPL-SCNC: 35 MMOL/L
CREAT SERPL-MCNC: 0.5 MG/DL
CREAT SERPL-MCNC: 0.5 MG/DL
DIFFERENTIAL METHOD: ABNORMAL
DIFFERENTIAL METHOD: ABNORMAL
EOSINOPHIL # BLD AUTO: 0.4 K/UL
EOSINOPHIL # BLD AUTO: 0.4 K/UL
EOSINOPHIL NFR BLD: 3.9 %
EOSINOPHIL NFR BLD: 4.3 %
ERYTHROCYTE [DISTWIDTH] IN BLOOD BY AUTOMATED COUNT: 18.6 %
ERYTHROCYTE [DISTWIDTH] IN BLOOD BY AUTOMATED COUNT: 19.1 %
EST. GFR  (AFRICAN AMERICAN): >60 ML/MIN/1.73 M^2
EST. GFR  (AFRICAN AMERICAN): >60 ML/MIN/1.73 M^2
EST. GFR  (NON AFRICAN AMERICAN): >60 ML/MIN/1.73 M^2
EST. GFR  (NON AFRICAN AMERICAN): >60 ML/MIN/1.73 M^2
GLUCOSE SERPL-MCNC: 106 MG/DL
GLUCOSE SERPL-MCNC: 91 MG/DL
HCT VFR BLD AUTO: 24.8 %
HCT VFR BLD AUTO: 25.7 %
HGB BLD-MCNC: 8.1 G/DL
HGB BLD-MCNC: 8.4 G/DL
IMM GRANULOCYTES # BLD AUTO: 0.09 K/UL
IMM GRANULOCYTES # BLD AUTO: 0.1 K/UL
IMM GRANULOCYTES NFR BLD AUTO: 0.9 %
IMM GRANULOCYTES NFR BLD AUTO: 1.1 %
LYMPHOCYTES # BLD AUTO: 1.9 K/UL
LYMPHOCYTES # BLD AUTO: 1.9 K/UL
LYMPHOCYTES NFR BLD: 18.6 %
LYMPHOCYTES NFR BLD: 20.2 %
MAGNESIUM SERPL-MCNC: 1.8 MG/DL
MAGNESIUM SERPL-MCNC: 2.1 MG/DL
MAGNESIUM SERPL-MCNC: 2.1 MG/DL
MAGNESIUM SERPL-MCNC: 2.3 MG/DL
MCH RBC QN AUTO: 27.2 PG
MCH RBC QN AUTO: 27.7 PG
MCHC RBC AUTO-ENTMCNC: 32.7 G/DL
MCHC RBC AUTO-ENTMCNC: 32.7 G/DL
MCV RBC AUTO: 83 FL
MCV RBC AUTO: 85 FL
MONOCYTES # BLD AUTO: 0.5 K/UL
MONOCYTES # BLD AUTO: 0.6 K/UL
MONOCYTES NFR BLD: 5.5 %
MONOCYTES NFR BLD: 5.6 %
NEUTROPHILS # BLD AUTO: 6.3 K/UL
NEUTROPHILS # BLD AUTO: 7.3 K/UL
NEUTROPHILS NFR BLD: 69.2 %
NEUTROPHILS NFR BLD: 70.5 %
NRBC BLD-RTO: 1 /100 WBC
NRBC BLD-RTO: 1 /100 WBC
PHOSPHATE SERPL-MCNC: 2.5 MG/DL
PHOSPHATE SERPL-MCNC: 2.6 MG/DL
PLATELET # BLD AUTO: 106 K/UL
PLATELET # BLD AUTO: 142 K/UL
PMV BLD AUTO: 11.5 FL
PMV BLD AUTO: 12.9 FL
POCT GLUCOSE: 102 MG/DL (ref 70–110)
POCT GLUCOSE: 110 MG/DL (ref 70–110)
POCT GLUCOSE: 120 MG/DL (ref 70–110)
POCT GLUCOSE: 141 MG/DL (ref 70–110)
POCT GLUCOSE: 167 MG/DL (ref 70–110)
POCT GLUCOSE: 169 MG/DL (ref 70–110)
POTASSIUM SERPL-SCNC: 3.3 MMOL/L
POTASSIUM SERPL-SCNC: 3.8 MMOL/L
POTASSIUM SERPL-SCNC: 4.8 MMOL/L
PROT SERPL-MCNC: 4.3 G/DL
PROT SERPL-MCNC: 4.4 G/DL
RBC # BLD AUTO: 2.92 M/UL
RBC # BLD AUTO: 3.09 M/UL
SODIUM SERPL-SCNC: 140 MMOL/L
SODIUM SERPL-SCNC: 141 MMOL/L
WBC # BLD AUTO: 10.27 K/UL
WBC # BLD AUTO: 9.14 K/UL

## 2019-02-03 PROCEDURE — 83735 ASSAY OF MAGNESIUM: CPT

## 2019-02-03 PROCEDURE — 82330 ASSAY OF CALCIUM: CPT

## 2019-02-03 PROCEDURE — 63600175 PHARM REV CODE 636 W HCPCS: Performed by: STUDENT IN AN ORGANIZED HEALTH CARE EDUCATION/TRAINING PROGRAM

## 2019-02-03 PROCEDURE — 25000242 PHARM REV CODE 250 ALT 637 W/ HCPCS: Performed by: SURGERY

## 2019-02-03 PROCEDURE — 99232 SBSQ HOSP IP/OBS MODERATE 35: CPT | Mod: ,,, | Performed by: NURSE PRACTITIONER

## 2019-02-03 PROCEDURE — 25000003 PHARM REV CODE 250: Performed by: STUDENT IN AN ORGANIZED HEALTH CARE EDUCATION/TRAINING PROGRAM

## 2019-02-03 PROCEDURE — 99232 SBSQ HOSP IP/OBS MODERATE 35: CPT | Mod: 25,,, | Performed by: ANESTHESIOLOGY

## 2019-02-03 PROCEDURE — 94640 AIRWAY INHALATION TREATMENT: CPT

## 2019-02-03 PROCEDURE — B4185 PARENTERAL SOL 10 GM LIPIDS: HCPCS | Performed by: STUDENT IN AN ORGANIZED HEALTH CARE EDUCATION/TRAINING PROGRAM

## 2019-02-03 PROCEDURE — 94761 N-INVAS EAR/PLS OXIMETRY MLT: CPT

## 2019-02-03 PROCEDURE — 84100 ASSAY OF PHOSPHORUS: CPT | Mod: 91

## 2019-02-03 PROCEDURE — 85025 COMPLETE CBC W/AUTO DIFF WBC: CPT | Mod: 91

## 2019-02-03 PROCEDURE — 84132 ASSAY OF SERUM POTASSIUM: CPT

## 2019-02-03 PROCEDURE — 25000003 PHARM REV CODE 250: Performed by: SURGERY

## 2019-02-03 PROCEDURE — 99900035 HC TECH TIME PER 15 MIN (STAT)

## 2019-02-03 PROCEDURE — C9113 INJ PANTOPRAZOLE SODIUM, VIA: HCPCS | Performed by: PHYSICIAN ASSISTANT

## 2019-02-03 PROCEDURE — 20000000 HC ICU ROOM

## 2019-02-03 PROCEDURE — 63600175 PHARM REV CODE 636 W HCPCS: Performed by: PHYSICIAN ASSISTANT

## 2019-02-03 PROCEDURE — 27000221 HC OXYGEN, UP TO 24 HOURS

## 2019-02-03 PROCEDURE — 99232 PR SUBSEQUENT HOSPITAL CARE,LEVL II: ICD-10-PCS | Mod: 25,,, | Performed by: ANESTHESIOLOGY

## 2019-02-03 PROCEDURE — A4217 STERILE WATER/SALINE, 500 ML: HCPCS | Performed by: STUDENT IN AN ORGANIZED HEALTH CARE EDUCATION/TRAINING PROGRAM

## 2019-02-03 PROCEDURE — 83735 ASSAY OF MAGNESIUM: CPT | Mod: 91

## 2019-02-03 PROCEDURE — 63600175 PHARM REV CODE 636 W HCPCS: Performed by: NURSE PRACTITIONER

## 2019-02-03 PROCEDURE — 82330 ASSAY OF CALCIUM: CPT | Mod: 91

## 2019-02-03 PROCEDURE — 80053 COMPREHEN METABOLIC PANEL: CPT

## 2019-02-03 PROCEDURE — 94664 DEMO&/EVAL PT USE INHALER: CPT

## 2019-02-03 PROCEDURE — 99232 PR SUBSEQUENT HOSPITAL CARE,LEVL II: ICD-10-PCS | Mod: ,,, | Performed by: NURSE PRACTITIONER

## 2019-02-03 PROCEDURE — 63600175 PHARM REV CODE 636 W HCPCS: Performed by: SURGERY

## 2019-02-03 PROCEDURE — 94799 UNLISTED PULMONARY SVC/PX: CPT

## 2019-02-03 PROCEDURE — S0030 INJECTION, METRONIDAZOLE: HCPCS | Performed by: SURGERY

## 2019-02-03 PROCEDURE — 84100 ASSAY OF PHOSPHORUS: CPT

## 2019-02-03 RX ORDER — POTASSIUM CHLORIDE 14.9 MG/ML
20 INJECTION INTRAVENOUS ONCE
Status: COMPLETED | OUTPATIENT
Start: 2019-02-03 | End: 2019-02-03

## 2019-02-03 RX ORDER — INSULIN ASPART 100 [IU]/ML
1-10 INJECTION, SOLUTION INTRAVENOUS; SUBCUTANEOUS EVERY 4 HOURS PRN
Status: DISCONTINUED | OUTPATIENT
Start: 2019-02-03 | End: 2019-02-17

## 2019-02-03 RX ORDER — GLUCAGON 1 MG
1 KIT INJECTION
Status: DISCONTINUED | OUTPATIENT
Start: 2019-02-03 | End: 2019-02-22

## 2019-02-03 RX ORDER — MAGNESIUM SULFATE HEPTAHYDRATE 40 MG/ML
2 INJECTION, SOLUTION INTRAVENOUS ONCE
Status: COMPLETED | OUTPATIENT
Start: 2019-02-03 | End: 2019-02-03

## 2019-02-03 RX ORDER — POTASSIUM CHLORIDE 29.8 MG/ML
40 INJECTION INTRAVENOUS ONCE
Status: COMPLETED | OUTPATIENT
Start: 2019-02-03 | End: 2019-02-03

## 2019-02-03 RX ADMIN — INSULIN ASPART 2 UNITS: 100 INJECTION, SOLUTION INTRAVENOUS; SUBCUTANEOUS at 05:02

## 2019-02-03 RX ADMIN — IPRATROPIUM BROMIDE AND ALBUTEROL SULFATE 3 ML: .5; 3 SOLUTION RESPIRATORY (INHALATION) at 09:02

## 2019-02-03 RX ADMIN — MAGNESIUM SULFATE IN WATER 2 G: 40 INJECTION, SOLUTION INTRAVENOUS at 03:02

## 2019-02-03 RX ADMIN — CIPROFLOXACIN 400 MG: 2 INJECTION, SOLUTION INTRAVENOUS at 09:02

## 2019-02-03 RX ADMIN — IPRATROPIUM BROMIDE AND ALBUTEROL SULFATE 3 ML: .5; 3 SOLUTION RESPIRATORY (INHALATION) at 07:02

## 2019-02-03 RX ADMIN — FUROSEMIDE 40 MG: 10 INJECTION, SOLUTION INTRAVENOUS at 05:02

## 2019-02-03 RX ADMIN — METRONIDAZOLE 500 MG: 500 INJECTION, SOLUTION INTRAVENOUS at 05:02

## 2019-02-03 RX ADMIN — FUROSEMIDE 40 MG: 10 INJECTION, SOLUTION INTRAVENOUS at 09:02

## 2019-02-03 RX ADMIN — POTASSIUM CHLORIDE 20 MEQ: 200 INJECTION, SOLUTION INTRAVENOUS at 03:02

## 2019-02-03 RX ADMIN — IPRATROPIUM BROMIDE AND ALBUTEROL SULFATE 3 ML: .5; 3 SOLUTION RESPIRATORY (INHALATION) at 01:02

## 2019-02-03 RX ADMIN — CALCIUM GLUCONATE 1000 MG: 98 INJECTION, SOLUTION INTRAVENOUS at 10:02

## 2019-02-03 RX ADMIN — CALCIUM GLUCONATE: 94 INJECTION, SOLUTION INTRAVENOUS at 10:02

## 2019-02-03 RX ADMIN — VANCOMYCIN HYDROCHLORIDE 750 MG: 750 INJECTION, POWDER, LYOPHILIZED, FOR SOLUTION INTRAVENOUS at 10:02

## 2019-02-03 RX ADMIN — HYDROMORPHONE HYDROCHLORIDE 0.5 MG: 1 INJECTION, SOLUTION INTRAMUSCULAR; INTRAVENOUS; SUBCUTANEOUS at 08:02

## 2019-02-03 RX ADMIN — CIPROFLOXACIN 400 MG: 2 INJECTION, SOLUTION INTRAVENOUS at 10:02

## 2019-02-03 RX ADMIN — SOYBEAN OIL 250 ML: 20 INJECTION, SOLUTION INTRAVENOUS at 10:02

## 2019-02-03 RX ADMIN — FLUTICASONE FUROATE AND VILANTEROL TRIFENATATE 1 PUFF: 100; 25 POWDER RESPIRATORY (INHALATION) at 09:02

## 2019-02-03 RX ADMIN — HYDROMORPHONE HYDROCHLORIDE 0.5 MG: 1 INJECTION, SOLUTION INTRAMUSCULAR; INTRAVENOUS; SUBCUTANEOUS at 09:02

## 2019-02-03 RX ADMIN — HYDROMORPHONE HYDROCHLORIDE 0.5 MG: 1 INJECTION, SOLUTION INTRAMUSCULAR; INTRAVENOUS; SUBCUTANEOUS at 10:02

## 2019-02-03 RX ADMIN — HYDROMORPHONE HYDROCHLORIDE 0.5 MG: 1 INJECTION, SOLUTION INTRAMUSCULAR; INTRAVENOUS; SUBCUTANEOUS at 03:02

## 2019-02-03 RX ADMIN — PANTOPRAZOLE SODIUM 40 MG: 40 INJECTION, POWDER, LYOPHILIZED, FOR SOLUTION INTRAVENOUS at 08:02

## 2019-02-03 RX ADMIN — METRONIDAZOLE 500 MG: 500 INJECTION, SOLUTION INTRAVENOUS at 09:02

## 2019-02-03 RX ADMIN — METRONIDAZOLE 500 MG: 500 INJECTION, SOLUTION INTRAVENOUS at 02:02

## 2019-02-03 RX ADMIN — ALTEPLASE 2 MG: 2.2 INJECTION, POWDER, LYOPHILIZED, FOR SOLUTION INTRAVENOUS at 04:02

## 2019-02-03 RX ADMIN — POTASSIUM CHLORIDE 40 MEQ: 400 INJECTION, SOLUTION INTRAVENOUS at 03:02

## 2019-02-03 RX ADMIN — PANTOPRAZOLE SODIUM 40 MG: 40 INJECTION, POWDER, LYOPHILIZED, FOR SOLUTION INTRAVENOUS at 09:02

## 2019-02-03 RX ADMIN — HYDROMORPHONE HYDROCHLORIDE 0.5 MG: 1 INJECTION, SOLUTION INTRAMUSCULAR; INTRAVENOUS; SUBCUTANEOUS at 05:02

## 2019-02-03 NOTE — PROGRESS NOTES
"[facility]  General Surgery  Progress Note    Subjective:     History of Present Illness:  64 yo W with a history of HTN, COPD on home oxygen, HFpEF, IDDMII, CVA on plavix, HTN, debility after fall and broken hip, and PE who presents to the ED with a several month history of nausea, vomiting, inability to tolerate a diet and weight loss. She has had multiple admissions in the past few months for different ailments. She presents today with continued nausea, vomiting and abdominal pain that is mostly worse in the RLQ. During the examination, she states her pain was all over her abdomen because she was retching so much. She states that she has lost close to 40lbs since her surgery and that she only able to keep broth down. She had a lap converted to open appendectomy back in August 2018 that was complicated by a wound infection, C diff and failure to thrive. This seems to persists. She is now wheelchair bound (per her) and apparently lives in [place] but is here in [place] with her daughter.    She had a CT scan in the ED which revealed an irregular shaped rim enhancing fluid collection measuring at least 4.0 x 3.0 cm ight hemipelvis at the site of prior appendectomy. Surgery was consulted for further recommendations. She was also noted to have a "knot" at the RLQ incision site that is also tender.    Post-Op Info:  Procedure(s) (LRB):  LAPAROTOMY, EXPLORATORY (N/A)  INCISION AND DRAINAGE, ABSCESS-  drainage of pelvic abscess (N/A)  EXCISION, ABSCESS- drainage abdominal wall abscess (N/A)  APPLICATION, WOUND VAC (N/A)   9 Days Post-Op     Interval History: No issues overnight. Continues to have good UOP with diuresis. Denies significant abdominal pain. Intermittent nausea but not worse from previous days.    Medications:  Continuous Infusions:   insulin (HUMAN R) infusion (adults) 0.3 Units/hr (02/03/19 0600)    TPN ADULT CENTRAL LINE CUSTOM 60 mL/hr at 02/03/19 0600     Scheduled Meds:   " albuterol-ipratropium  3 mL Nebulization Q6H    ciprofloxacin  400 mg Intravenous Q12H    fat emulsion 20%  250 mL Intravenous Daily    fluticasone-vilanterol  1 puff Inhalation Daily    furosemide  40 mg Intravenous BID    metronidazole  500 mg Intravenous Q8H    pantoprozole (PROTONIX) IV  40 mg Intravenous Q12H    vancomycin (VANCOCIN) IVPB  750 mg Intravenous Q24H     PRN Meds:sodium chloride, sodium chloride, albuterol sulfate, dextrose 50%, dextrose 50%, diclofenac sodium, diphenhydrAMINE, glucagon (human recombinant), glucose, glucose, HYDROmorphone, insulin aspart U-100, ondansetron, promethazine (PHENERGAN) IVPB, sodium chloride 0.9%, sodium chloride 0.9%, white petrolatum     Review of patient's allergies indicates:   Allergen Reactions    Ace inhibitors Swelling    Carvedilol      Other reaction(s): Other (See Comments)  blister    Hydralazine analogues     Metformin Nausea And Vomiting    Tetracycline Itching    Tetracyclines Swelling    Travatan (with benzalkonium) [travoprost (benzalkonium)]     Travoprost Itching     Other reaction(s): Other (See Comments)  Blurry vision     Objective:     Vital Signs (Most Recent):  Temp: 98.2 °F (36.8 °C) (02/03/19 0700)  Pulse: 104 (02/03/19 0903)  Resp: 18 (02/03/19 0903)  BP: 133/63 (02/03/19 0800)  SpO2: 97 % (02/03/19 0800) Vital Signs (24h Range):  Temp:  [97.4 °F (36.3 °C)-98.3 °F (36.8 °C)] 98.2 °F (36.8 °C)  Pulse:  [] 104  Resp:  [11-28] 18  SpO2:  [90 %-100 %] 97 %  BP: (133-174)/(63-86) 133/63     Weight: 71.2 kg (157 lb)  Body mass index is 29.66 kg/m².    Intake/Output - Last 3 Shifts       02/01 0700 - 02/02 0659 02/02 0700 - 02/03 0659 02/03 0700 - 02/04 0659    I.V. (mL/kg) 525.6 (7.4) 139.5 (2)     Blood       IV Piggyback 800 2100     TPN 1564.4 1566.6     Total Intake(mL/kg) 2890 (40.6) 3806.1 (53.5)     Urine (mL/kg/hr) 6825 (4) 5175 (3) 100 (0.6)    Drains 596 560 20    Other       Total Output 6092 5716 120    Net -4200  -1928.9 -120                 Physical Exam   Constitutional: She appears well-developed. No distress.   HENT:   Head: Normocephalic and atraumatic.   Pulmonary/Chest: Effort normal. No stridor. No respiratory distress.   Coarse breath sounds bilaterally   Abdominal: Soft. Bowel sounds are decreased. There is tenderness. There is no guarding.       Open wounds covered with adequate dressing. No signs of infection. LUCIANO drain with serous output.   Musculoskeletal: She exhibits no edema.   Neurological: She is alert.   Skin: Skin is warm and dry.   Psychiatric: She has a normal mood and affect.       Significant Labs:  CBC:   Recent Labs   Lab 02/03/19  0749   WBC 9.14   RBC 3.09*   HGB 8.4*   HCT 25.7*   *   MCV 83   MCH 27.2   MCHC 32.7     CMP:   Recent Labs   Lab 02/02/19  1930 02/03/19  0150   *  --    CALCIUM 7.9*  --    ALBUMIN 1.6*  --    PROT 4.1*  --      --    K 2.9* 3.3*   CO2 35*  --    CL 97  --    BUN 26*  --    CREATININE 0.6  --    ALKPHOS 1,081*  --    *  --    *  --    BILITOT 4.2*  --        Significant Diagnostics:  None    Assessment/Plan:     * Pelvic abscess in female    -S/p ex-lap with drainage of abscess. On antibiotics.     Multiple skin tears    -Wound care following     Depression    -Hold home meds for now until tolerating po     Hypophosphatemia    -Replace as needed     Debility    -PT/OT; history of fall with non displaced oblique right tibia fracture at metaphysis into diaphysis. Was wearing brace.  -Severely debilited       Generalized abdominal pain    64 yo female presenting with abdominal pain, nausea, elevated lactate s/p open drainage pelvic abscess 1/25     -Continue broad spec abx, f/u cultures- abdominal with bacteroides, blood cx from admit MRSA x2 plan for 2 week course vanc, cont cipro/flagyl plan for 4 week course  -NPO for now until seen by speech  -WOCN following, appreciate help with wounds/blisters, blisters appear to be 2/2 severe  anasarca  -US with few small gallstones and no evidence acute cholecystitis - LFT elevation likely 2/2 TPN but may consider GI eval given hyperbilirubinemia.  -Continue ICU care; will stepdown 2/4/19     Moderate malnutrition    -Continue TPN  -Speech eval today; failed bedside swallow     Essential hypertension    -Hold home meds for now     Gastroesophageal reflux disease without esophagitis    -Continue BID IV protonix     (HFpEF) heart failure with preserved ejection fraction    Last echo 8/2018 with no WMAs, grade 1DD, EF 60%  -Strict I/O, daily weights  -Continue diuresis; will go to daily lasix given elevation in CO2     Moderate asthma without complication    -Continue with scheduled duonebs     Elevated troponin    -Cardiology consulted. On ASA/plavix at home  -Mild elevation; rectal aspirin to start today     CAD (coronary artery disease)    -Cardiology consulted for EKG changes, mild elevation in troponin, type II MI, on ASA/clopidogrel at home  -Will give rectal aspirin today     Insulin dependent diabetes mellitus    -Currently on insulin gtt  -Will switch to SSI when appropriate         Christopher Huang MD  General Surgery  [facility]

## 2019-02-03 NOTE — PROGRESS NOTES
[facility]  Critical Care - Surgery  Progress Note    Patient Name: Sheryl Martines  MRN: [id number]  Admission Date: 1/23/2019  Hospital Length of Stay: 10 days  Code Status: Full Code  Attending Provider: Monster Laurent MD  Primary Care Provider: Primary Doctor No   Principal Problem: Pelvic abscess in female    Subjective:     Hospital/ICU Course:  No notes on file    Interval History/Significant Events: No acute events overnight. H/H stable. Continues to have good UOP (75 cc/hr overnight) with bid lasix dosing. Currently off sedation, satting well on RA. Not requiring vasopressor support.      Follow-up For: Procedure(s) (LRB):  LAPAROTOMY, EXPLORATORY (N/A)  INCISION AND DRAINAGE, ABSCESS-  drainage of pelvic abscess (N/A)  EXCISION, ABSCESS- drainage abdominal wall abscess (N/A)  APPLICATION, WOUND VAC (N/A)    Post-Operative Day: 8 Days Post-Op    Objective:     Vital Signs (Most Recent):  Temp: 98.5 °F (36.9 °C) (02/02/19 0300)  Pulse: 93 (02/02/19 0615)  Resp: 15 (02/02/19 0615)  BP: (!) 157/71 (02/02/19 0600)  SpO2: 95 % (02/02/19 0615) Vital Signs (24h Range):  Temp:  [97.8 °F (36.6 °C)-98.5 °F (36.9 °C)] 98.5 °F (36.9 °C)  Pulse:  [79-97] 93  Resp:  [9-29] 15  SpO2:  [93 %-100 %] 95 %  BP: (124-185)/() 157/71     Weight: 71.2 kg (157 lb)  Body mass index is 29.66 kg/m².      Intake/Output Summary (Last 24 hours) at 2/2/2019 0731  Last data filed at 2/2/2019 0533  Gross per 24 hour   Intake 2658.37 ml   Output 7421 ml   Net -4762.63 ml       Physical Exam    Constitutional: She appears well-developed and well-nourished. No distress.   HENT:   Head: Normocephalic.   Eyes: Pupils are equal, round, and reactive to light.   Cardiovascular: Regular rate and rhythm.   Pulmonary/Chest: No respiratory distress. She has no wheezes.   Extubated on RA.   Abdominal: Soft. She exhibits no distension.   Neurological:   lethargic   Skin: Skin is warm and dry.       Vents: Not on  Ventilator.      Lines/Drains/Airways     Central Venous Catheter Line                 Port A Cath Single Lumen 01/23/19 1400 right subclavian 9 days         Trialysis (Dialysis) Catheter 01/26/19 1200 right internal jugular 6 days          Drain                 Closed/Suction Drain 01/25/19 1020 Right;Ventral Abdomen Bulb 19 Fr. 7 days         Urethral Catheter 01/25/19 0934 Straight-tip;Non-latex 16 Fr. 7 days                Significant Labs:    CBC/Anemia Profile:  Recent Labs   Lab 01/31/19 1957 02/01/19 0645 02/01/19 2010   WBC 8.67 6.97 6.97   HGB 6.4* 8.4* 8.3*   HCT 19.4* 24.2* 24.3*   * 116* 106*   MCV 80* 81* 81*   RDW 18.6* 18.1* 17.8*        Chemistries:  Recent Labs   Lab 02/01/19  0121 02/01/19 0645 02/01/19 1200 02/01/19 2010 02/02/19  0129    136 137 137  --    K 3.0* 3.7 3.8 3.1*  --     100 99 98  --    CO2 27 31* 31* 36*  --    BUN 23 24* 24* 24*  --    CREATININE 0.6 0.6 0.6 0.6  --    CALCIUM 7.8* 8.0* 7.9* 7.8*  --    ALBUMIN  --  1.7* 1.7* 1.7*  --    PROT  --  4.2* 3.9* 4.0*  --    BILITOT  --  5.1* 5.1* 4.8*  --    ALKPHOS  --  1,183* 1,136* 1,149*  --    ALT  --  145* 158* 172*  --    AST  --  277* 277* 235*  --    MG 1.9  --  1.8  --  1.7   PHOS 2.7  --  2.3*  --  2.0*         Significant Imaging:  I have reviewed all pertinent imaging results/findings within the past 24 hours.    Assessment/Plan:     * Pelvic abscess in female    63 y.o. female with hx of asthma, COPD, CAD, DM, HTN, CVA 2012 with R side deficits, HFpEF, severe debility, lap converted to open appendectomy 8/2018 complicated by cdiff, failure to thrive.  Hx of difficult intubation and delayed emergence, CO2 narcosis needing ICU stay.  Hx of PRN O2 at night. Patient now s/p exploratory laparotomy, washout, and pelvic abscess drainage on 1/25/19.    Post-operatively, on initial evaluation in PACU, patient hypotensive to 60s/40s, on BiPap. Currently fluid resuscitating with 3rd liter of LR, responding  well to phenylephrine, starting phenylephrine infusion (tachycardic to ~118). ABG 7.11/49/116/16, BE -14, on bipap 14/5, 35% FiO2. Intubated on 1/26 for airway protection. Extubated 1/28. Required CRRT on 1/26 for metabolic acidosis, now off.     Neuro:  Sedation: None  Pain: dilaudid 0.5mg PRN     CV:  - HDS  - Off pressors  - Lactate 3.4 --> 2.3 --> 2.6->2.2->1.8->2.1 (no longer trending)  - 1 unit pRBC 2/1  - 1 unit platelets 1/31  - H/H 8.4/24.6    Pulm:   - Extubated 1/28/19  - on RA  - prn CXR  - prn ABG    Renal:  Trend BUN/Cr: 26/0.6  Monitor Urine output (4.9L over past 24 hours)  Nephro following, appreciate assistance    ID:  AF  1/24 blood cultures: gram positive cocci in clusters resembling staph  1/25 blood cultures: NGTD  1/25 operative cultures pending, ngtd thus far  - on cipro/metro/vanc  -wound vac removed by wound care 1/31; tolerated well      FEN/GI:  TPN @ 75  Replace lytes PRN  Last lactate 2.1; no longer trending  Lasix 40 mg bid  Trend Alk phos, LFTs  -Abdominal U/S 2/1: few gallstones, no acute cholecystitis     Endo:  Patient with IDDM2, on detemir 20u BID at home  1/27-1/28 with hyperglycemia into the 500s on insulin gtt @ 100u/hr  1/28-1/29 with hypoglycemia requiring d10 infusion @ 125cc/hr  With introduction of TPN has now come off of D10 with glucose levels stable; continues on insulin gtt    Heme:  Post-op hgb 8.5   Plt up to 110 on most recent labs, heparin held  HIT panel negative  No signs of bleeding at this time    Dispo: Cont ICU care. Stable for possible stepdown? Will discuss with primary team.  Primary: SICU/general surgery            Critical care was time spent personally by me on the following activities: development of treatment plan with patient or surrogate and bedside caregivers, discussions with consultants, evaluation of patient's response to treatment, examination of patient, ordering and performing treatments and interventions, ordering and review of laboratory  studies, ordering and review of radiographic studies, pulse oximetry, re-evaluation of patient's condition.  This critical care time did not overlap with that of any other provider or involve time for any procedures.     Chyna Ott MD  Critical Care - Surgery  [facility]

## 2019-02-03 NOTE — PROGRESS NOTES
"[facility]  Endocrinology  Progress Note    Admit Date: 1/23/2019     Reason for Consult: Management of T2DM, Hyperglycemia     Surgical Procedure and Date:      LAPAROTOMY, EXPLORATORY (N/A)     INCISION AND DRAINAGE, ABSCESS-  drainage of pelvic abscess (N/A)     EXCISION, ABSCESS- drainage abdominal wall abscess (N/A)     APPLICATION, WOUND VAC (N/A)       Diabetes diagnosis year: 20 years ago    Home Diabetes Medications:       Levemir 20 units BID     Humalog 16 units TIDWM with correction     How often checking glucose at home? >4 x day   BG readings on regimen: 110's  Hypoglycemia on the regimen?  Yes - 40's  Missed doses on regimen?  No    Diabetes Complications include:     Hypoglycemia     Complicating diabetes co morbidities:   HLD, CVA, and CAD    Lab Results   Component Value Date    HGBA1C 4.7 01/24/2019       HPI:   Patient is a 63 y.o. female with a diagnosis of sepsis and lactic acidosis secondary to abdominal abscess. Also has diagnosis of COPD, asthma, CAD, CVA (2012), and DM2. Patient receives primary care for DM in [place]. Her primary care giver is her daughter. Patient's DM is well controlled by primary Endocrinologist in [place]. Patient has not been on insulin regimen for the past 3 weeks leading up to hospitalization. According to daughter, patient was not eating, and having low BG reading. Therefore, insulin regimen was stopped by patient's primary Endocrinologist. Endocrinology at [facility] consulted to manage DM/hyperglycemia.             Interval HPI:   Overnight events:Remains in ICU. Contact isolation. BG at or slightly below goal overnight on insulin infusion at 0.3 u/hr.   Eating:   NPO  Nausea: No  Hypoglycemia and intervention: No  Fever: No  TPN at 60 cc/hr       /60   Pulse 104   Temp 98.3 °F (36.8 °C) (Oral)   Resp 14   Ht 5' 1" (1.549 m)   Wt 71.2 kg (157 lb)   LMP  (LMP Unknown)   SpO2 97%   Breastfeeding? No   BMI 29.66 kg/m²      Labs Reviewed " and Include    Recent Labs   Lab 02/03/19  0749      CALCIUM 8.1*   ALBUMIN 1.6*   PROT 4.3*      K 3.8   CO2 35*   CL 99   BUN 27*   CREATININE 0.5   ALKPHOS 1,070*   *   *   BILITOT 4.4*     Lab Results   Component Value Date    WBC 9.14 02/03/2019    HGB 8.4 (L) 02/03/2019    HCT 25.7 (L) 02/03/2019    MCV 83 02/03/2019     (L) 02/03/2019     No results for input(s): TSH, FREET4 in the last 168 hours.  Lab Results   Component Value Date    HGBA1C 4.7 01/24/2019       Nutritional status:   Body mass index is 29.66 kg/m².  Lab Results   Component Value Date    ALBUMIN 1.6 (L) 02/03/2019    ALBUMIN 1.6 (L) 02/02/2019    ALBUMIN 1.7 (L) 02/02/2019     Lab Results   Component Value Date    PREALBUMIN 6 (L) 01/31/2019    PREALBUMIN 3 (L) 01/28/2019    PREALBUMIN 5 (L) 01/24/2019       Estimated Creatinine Clearance: 104 mL/min (based on SCr of 0.5 mg/dL).    Accu-Checks  Recent Labs     02/01/19  2016 02/02/19  0126 02/02/19  0814 02/02/19  1214 02/02/19  1531 02/02/19  1930 02/03/19  0025 02/03/19  0432 02/03/19  0805 02/03/19  1211   POCTGLUCOSE 221* 157* 175* 203* 183* 196* 167* 110 120* 141*       Current Medications and/or Treatments Impacting Glycemic Control  Immunotherapy:    Immunosuppressants     None        Steroids:   Hormones (From admission, onward)    None        Pressors:    Autonomic Drugs (From admission, onward)    None        Hyperglycemia/Diabetes Medications:   Antihyperglycemics (From admission, onward)    Start     Stop Route Frequency Ordered    02/03/19 1016  insulin aspart U-100 pen 1-10 Units      -- SubQ Every 4 hours PRN 02/03/19 0916          ASSESSMENT and PLAN    * Pelvic abscess in female    Managed per primary team  Avoid hypoglycemia  Optimize BG control to improve wound healing         Insulin dependent diabetes mellitus    BG goal 140 - 180     Discontinue IV insulin.  BG monitoring every 4 hours and moderate dose correction scale.     Discharge  planning: TBD         CAD (coronary artery disease)    Managed per primary team  Condition may cause insulin resistance          Adverse effect of adrenal cortical steroids, sequela    On steroid therapy per primary team; may elevate BG readings         Overweight (BMI 25.0-29.9)    Body mass index is 29.66 kg/m².  may contribute to insulin resistance         On TPN  May elevate BG.     Marta Sandoval NP  Endocrinology  [facility]

## 2019-02-03 NOTE — SUBJECTIVE & OBJECTIVE
"Interval HPI:   Overnight events:Remains in ICU. Contact isolation. BG at or slightly below goal overnight on insulin infusion at 0.3 u/hr.   Eating:   NPO  Nausea: No  Hypoglycemia and intervention: No  Fever: No  TPN at 60 cc/hr       /60   Pulse 104   Temp 98.3 °F (36.8 °C) (Oral)   Resp 14   Ht 5' 1" (1.549 m)   Wt 71.2 kg (157 lb)   LMP  (LMP Unknown)   SpO2 97%   Breastfeeding? No   BMI 29.66 kg/m²     Labs Reviewed and Include    Recent Labs   Lab 02/03/19  0749      CALCIUM 8.1*   ALBUMIN 1.6*   PROT 4.3*      K 3.8   CO2 35*   CL 99   BUN 27*   CREATININE 0.5   ALKPHOS 1,070*   *   *   BILITOT 4.4*     Lab Results   Component Value Date    WBC 9.14 02/03/2019    HGB 8.4 (L) 02/03/2019    HCT 25.7 (L) 02/03/2019    MCV 83 02/03/2019     (L) 02/03/2019     No results for input(s): TSH, FREET4 in the last 168 hours.  Lab Results   Component Value Date    HGBA1C 4.7 01/24/2019       Nutritional status:   Body mass index is 29.66 kg/m².  Lab Results   Component Value Date    ALBUMIN 1.6 (L) 02/03/2019    ALBUMIN 1.6 (L) 02/02/2019    ALBUMIN 1.7 (L) 02/02/2019     Lab Results   Component Value Date    PREALBUMIN 6 (L) 01/31/2019    PREALBUMIN 3 (L) 01/28/2019    PREALBUMIN 5 (L) 01/24/2019       Estimated Creatinine Clearance: 104 mL/min (based on SCr of 0.5 mg/dL).    Accu-Checks  Recent Labs     02/01/19  2016 02/02/19  0126 02/02/19  0814 02/02/19  1214 02/02/19  1531 02/02/19  1930 02/03/19  0025 02/03/19  0432 02/03/19  0805 02/03/19  1211   POCTGLUCOSE 221* 157* 175* 203* 183* 196* 167* 110 120* 141*       Current Medications and/or Treatments Impacting Glycemic Control  Immunotherapy:    Immunosuppressants     None        Steroids:   Hormones (From admission, onward)    None        Pressors:    Autonomic Drugs (From admission, onward)    None        Hyperglycemia/Diabetes Medications:   Antihyperglycemics (From admission, onward)    Start     Stop Route " Frequency Ordered    02/03/19 1016  insulin aspart U-100 pen 1-10 Units      -- SubQ Every 4 hours PRN 02/03/19 0916

## 2019-02-03 NOTE — SUBJECTIVE & OBJECTIVE
Interval History: No issues overnight. Continues to have good UOP with diuresis. Denies significant abdominal pain. Intermittent nausea but not worse from previous days.    Medications:  Continuous Infusions:   insulin (HUMAN R) infusion (adults) 0.3 Units/hr (02/03/19 0600)    TPN ADULT CENTRAL LINE CUSTOM 60 mL/hr at 02/03/19 0600     Scheduled Meds:   albuterol-ipratropium  3 mL Nebulization Q6H    ciprofloxacin  400 mg Intravenous Q12H    fat emulsion 20%  250 mL Intravenous Daily    fluticasone-vilanterol  1 puff Inhalation Daily    furosemide  40 mg Intravenous BID    metronidazole  500 mg Intravenous Q8H    pantoprozole (PROTONIX) IV  40 mg Intravenous Q12H    vancomycin (VANCOCIN) IVPB  750 mg Intravenous Q24H     PRN Meds:sodium chloride, sodium chloride, albuterol sulfate, dextrose 50%, dextrose 50%, diclofenac sodium, diphenhydrAMINE, glucagon (human recombinant), glucose, glucose, HYDROmorphone, insulin aspart U-100, ondansetron, promethazine (PHENERGAN) IVPB, sodium chloride 0.9%, sodium chloride 0.9%, white petrolatum     Review of patient's allergies indicates:   Allergen Reactions    Ace inhibitors Swelling    Carvedilol      Other reaction(s): Other (See Comments)  blister    Hydralazine analogues     Metformin Nausea And Vomiting    Tetracycline Itching    Tetracyclines Swelling    Travatan (with benzalkonium) [travoprost (benzalkonium)]     Travoprost Itching     Other reaction(s): Other (See Comments)  Blurry vision     Objective:     Vital Signs (Most Recent):  Temp: 98.2 °F (36.8 °C) (02/03/19 0700)  Pulse: 104 (02/03/19 0903)  Resp: 18 (02/03/19 0903)  BP: 133/63 (02/03/19 0800)  SpO2: 97 % (02/03/19 0800) Vital Signs (24h Range):  Temp:  [97.4 °F (36.3 °C)-98.3 °F (36.8 °C)] 98.2 °F (36.8 °C)  Pulse:  [] 104  Resp:  [11-28] 18  SpO2:  [90 %-100 %] 97 %  BP: (133-174)/(63-86) 133/63     Weight: 71.2 kg (157 lb)  Body mass index is 29.66 kg/m².    Intake/Output - Last 3  Shifts       02/01 0700 - 02/02 0659 02/02 0700 - 02/03 0659 02/03 0700 - 02/04 0659    I.V. (mL/kg) 525.6 (7.4) 139.5 (2)     Blood       IV Piggyback 800 2100     TPN 1564.4 1566.6     Total Intake(mL/kg) 2890 (40.6) 3806.1 (53.5)     Urine (mL/kg/hr) 6825 (4) 5175 (3) 100 (0.6)    Drains 596 560 20    Other       Total Output 7421 5735 120    Net -4531 -1928.9 -120                 Physical Exam   Constitutional: She appears well-developed. No distress.   HENT:   Head: Normocephalic and atraumatic.   Pulmonary/Chest: Effort normal. No stridor. No respiratory distress.   Coarse breath sounds bilaterally   Abdominal: Soft. Bowel sounds are decreased. There is tenderness. There is no guarding.       Open wounds covered with adequate dressing. No signs of infection. LUCIANO drain with serous output.   Musculoskeletal: She exhibits no edema.   Neurological: She is alert.   Skin: Skin is warm and dry.   Psychiatric: She has a normal mood and affect.       Significant Labs:  CBC:   Recent Labs   Lab 02/03/19  0749   WBC 9.14   RBC 3.09*   HGB 8.4*   HCT 25.7*   *   MCV 83   MCH 27.2   MCHC 32.7     CMP:   Recent Labs   Lab 02/02/19  1930 02/03/19  0150   *  --    CALCIUM 7.9*  --    ALBUMIN 1.6*  --    PROT 4.1*  --      --    K 2.9* 3.3*   CO2 35*  --    CL 97  --    BUN 26*  --    CREATININE 0.6  --    ALKPHOS 1,081*  --    *  --    *  --    BILITOT 4.2*  --        Significant Diagnostics:  None

## 2019-02-03 NOTE — PLAN OF CARE
Problem: Adult Inpatient Plan of Care  Goal: Plan of Care Review  PMH: HTN, COPD, HF, DMII, CVA (2012; R-side weakness residual); hip fx; FTT; PE    DX: s/p ex-lap I&D R hemipelvis fluid collection 1/25 post-op hypotension/acidosis requiring fluid rescus.     1/26: SCUF overnight, electrolyte repletion, trending lactate. (9.6->6.9->6.2->6.0); restart TPN;   1/28: extubated; insulin gtt off; Vaso off; D10% gtt started; HIT panel sent  1/31: insulin gtt maintained          Nursing:   MAP >65  Neuro checks Q4H  Accucheck Q4H  PM Bath         Outcome: Ongoing (interventions implemented as appropriate)  MD at the bedside to discuss the plan of care in regards to possible stepdown on 2/4/2019, continuation of TPN therapy and continuation of monitoring electrolytes and CBC.  All questions and concerns were answered and addressed.  Patient verbalized understanding to plan of care    Problem: Diabetes Comorbidity  Goal: Blood Glucose Level Within Desired Range  Outcome: Ongoing (interventions implemented as appropriate)  Continuation of monitoring blood glucose and administration of sliding scale insulin according to blood glucose level    Problem: Infection  Goal: Infection Symptom Resolution  Outcome: Ongoing (interventions implemented as appropriate)  Abx. Therapy continued.

## 2019-02-03 NOTE — PLAN OF CARE
Problem: Adult Inpatient Plan of Care  Goal: Patient-Specific Goal (Individualization)  Outcome: Ongoing (interventions implemented as appropriate)  Patient AAOx4.  VSS.  Has remained afebrile this shift.  MAEW/purposefully.  Neuro checks completed Q4H, no changes noted.  Electrolytes replaced PRN.  IV abx infused per orders.  Pain managed with PRN pain management plan.  Wound care completed per orders.  Patient received complete Hibiclens bath.  LUCIANO drain care completed PRN. POC reviewed with patient.  Open discussion was facilitated.  Patient verbalized understanding.  Bed in lowest position, side rails up x2, call light within reach, and safety measures maintained throughout shift.  Has remained free of falls, trauma, and injury this shift.  Patient denies any needs at this time.  Will continue to monitor.

## 2019-02-03 NOTE — ASSESSMENT & PLAN NOTE
-PT/OT; history of fall with non displaced oblique right tibia fracture at metaphysis into diaphysis. Was wearing brace.  -Severely debilited

## 2019-02-03 NOTE — ASSESSMENT & PLAN NOTE
62 yo female presenting with abdominal pain, nausea, elevated lactate s/p open drainage pelvic abscess 1/25     -Continue broad spec abx, f/u cultures- abdominal with bacteroides, blood cx from admit MRSA x2 plan for 2 week course vanc, cont cipro/flagyl plan for 4 week course  -NPO for now until seen by speech  -WOCN following, appreciate help with wounds/blisters, blisters appear to be 2/2 severe anasarca  -US with few small gallstones and no evidence acute cholecystitis - LFT elevation likely 2/2 TPN but may consider GI eval given hyperbilirubinemia.  -Continue ICU care; will stepdown 2/4/19

## 2019-02-03 NOTE — ASSESSMENT & PLAN NOTE
BG goal 140 - 180       BG monitoring every 4 hours and moderate dose correction scale.     Discharge planning: TBD

## 2019-02-03 NOTE — ASSESSMENT & PLAN NOTE
-Cardiology consulted for EKG changes, mild elevation in troponin, type II MI, on ASA/clopidogrel at home  -Will give rectal aspirin today

## 2019-02-03 NOTE — ASSESSMENT & PLAN NOTE
Last echo 8/2018 with no WMAs, grade 1DD, EF 60%  -Strict I/O, daily weights  -Continue diuresis; will go to daily lasix given elevation in CO2

## 2019-02-04 LAB
ALBUMIN SERPL BCP-MCNC: 1.5 G/DL
ALBUMIN SERPL BCP-MCNC: 1.6 G/DL
ALP SERPL-CCNC: 1057 U/L
ALP SERPL-CCNC: 1111 U/L
ALT SERPL W/O P-5'-P-CCNC: 121 U/L
ALT SERPL W/O P-5'-P-CCNC: 139 U/L
ANION GAP SERPL CALC-SCNC: 4 MMOL/L
ANION GAP SERPL CALC-SCNC: 5 MMOL/L
AST SERPL-CCNC: 125 U/L
AST SERPL-CCNC: 83 U/L
BASOPHILS # BLD AUTO: 0.01 K/UL
BASOPHILS # BLD AUTO: 0.01 K/UL
BASOPHILS NFR BLD: 0.1 %
BASOPHILS NFR BLD: 0.1 %
BILIRUB SERPL-MCNC: 4 MG/DL
BILIRUB SERPL-MCNC: 4.3 MG/DL
BUN SERPL-MCNC: 29 MG/DL
BUN SERPL-MCNC: 30 MG/DL
CA-I BLDV-SCNC: 1.04 MMOL/L
CA-I BLDV-SCNC: 1.06 MMOL/L
CA-I BLDV-SCNC: 1.08 MMOL/L
CALCIUM SERPL-MCNC: 8.2 MG/DL
CALCIUM SERPL-MCNC: 8.5 MG/DL
CHLORIDE SERPL-SCNC: 98 MMOL/L
CHLORIDE SERPL-SCNC: 99 MMOL/L
CO2 SERPL-SCNC: 35 MMOL/L
CO2 SERPL-SCNC: 36 MMOL/L
CREAT SERPL-MCNC: 0.5 MG/DL
CREAT SERPL-MCNC: 0.6 MG/DL
DIFFERENTIAL METHOD: ABNORMAL
DIFFERENTIAL METHOD: ABNORMAL
EOSINOPHIL # BLD AUTO: 0.6 K/UL
EOSINOPHIL # BLD AUTO: 0.6 K/UL
EOSINOPHIL NFR BLD: 5.6 %
EOSINOPHIL NFR BLD: 5.6 %
ERYTHROCYTE [DISTWIDTH] IN BLOOD BY AUTOMATED COUNT: 19.4 %
ERYTHROCYTE [DISTWIDTH] IN BLOOD BY AUTOMATED COUNT: 19.7 %
EST. GFR  (AFRICAN AMERICAN): >60 ML/MIN/1.73 M^2
EST. GFR  (AFRICAN AMERICAN): >60 ML/MIN/1.73 M^2
EST. GFR  (NON AFRICAN AMERICAN): >60 ML/MIN/1.73 M^2
EST. GFR  (NON AFRICAN AMERICAN): >60 ML/MIN/1.73 M^2
GLUCOSE SERPL-MCNC: 154 MG/DL
GLUCOSE SERPL-MCNC: 154 MG/DL
HCT VFR BLD AUTO: 25 %
HCT VFR BLD AUTO: 29 %
HGB BLD-MCNC: 8.4 G/DL
HGB BLD-MCNC: 9.1 G/DL
IMM GRANULOCYTES # BLD AUTO: 0.07 K/UL
IMM GRANULOCYTES # BLD AUTO: 0.09 K/UL
IMM GRANULOCYTES NFR BLD AUTO: 0.7 %
IMM GRANULOCYTES NFR BLD AUTO: 0.8 %
LYMPHOCYTES # BLD AUTO: 1.7 K/UL
LYMPHOCYTES # BLD AUTO: 2 K/UL
LYMPHOCYTES NFR BLD: 16.3 %
LYMPHOCYTES NFR BLD: 17.8 %
MAGNESIUM SERPL-MCNC: 1.9 MG/DL
MAGNESIUM SERPL-MCNC: 1.9 MG/DL
MAGNESIUM SERPL-MCNC: 2 MG/DL
MCH RBC QN AUTO: 27 PG
MCH RBC QN AUTO: 28.3 PG
MCHC RBC AUTO-ENTMCNC: 31.4 G/DL
MCHC RBC AUTO-ENTMCNC: 33.6 G/DL
MCV RBC AUTO: 84 FL
MCV RBC AUTO: 86 FL
MONOCYTES # BLD AUTO: 0.4 K/UL
MONOCYTES # BLD AUTO: 0.5 K/UL
MONOCYTES NFR BLD: 3.7 %
MONOCYTES NFR BLD: 4.7 %
NEUTROPHILS # BLD AUTO: 7.7 K/UL
NEUTROPHILS # BLD AUTO: 8 K/UL
NEUTROPHILS NFR BLD: 72 %
NEUTROPHILS NFR BLD: 72.6 %
NRBC BLD-RTO: 0 /100 WBC
NRBC BLD-RTO: 0 /100 WBC
PHOSPHATE SERPL-MCNC: 2.8 MG/DL
PHOSPHATE SERPL-MCNC: 3.3 MG/DL
PHOSPHATE SERPL-MCNC: 3.8 MG/DL
PLATELET # BLD AUTO: 100 K/UL
PLATELET # BLD AUTO: 102 K/UL
PMV BLD AUTO: 11.2 FL
PMV BLD AUTO: 12.1 FL
POCT GLUCOSE: 139 MG/DL (ref 70–110)
POCT GLUCOSE: 142 MG/DL (ref 70–110)
POCT GLUCOSE: 171 MG/DL (ref 70–110)
POCT GLUCOSE: 173 MG/DL (ref 70–110)
POCT GLUCOSE: 177 MG/DL (ref 70–110)
POTASSIUM SERPL-SCNC: 4.3 MMOL/L
POTASSIUM SERPL-SCNC: 4.3 MMOL/L
PREALB SERPL-MCNC: 10 MG/DL
PROT SERPL-MCNC: 4.2 G/DL
PROT SERPL-MCNC: 4.2 G/DL
RBC # BLD AUTO: 2.97 M/UL
RBC # BLD AUTO: 3.37 M/UL
SODIUM SERPL-SCNC: 138 MMOL/L
SODIUM SERPL-SCNC: 139 MMOL/L
WBC # BLD AUTO: 10.59 K/UL
WBC # BLD AUTO: 11.14 K/UL

## 2019-02-04 PROCEDURE — 84100 ASSAY OF PHOSPHORUS: CPT

## 2019-02-04 PROCEDURE — 25000003 PHARM REV CODE 250: Performed by: STUDENT IN AN ORGANIZED HEALTH CARE EDUCATION/TRAINING PROGRAM

## 2019-02-04 PROCEDURE — A4217 STERILE WATER/SALINE, 500 ML: HCPCS | Performed by: SURGERY

## 2019-02-04 PROCEDURE — C1751 CATH, INF, PER/CENT/MIDLINE: HCPCS

## 2019-02-04 PROCEDURE — 20000000 HC ICU ROOM

## 2019-02-04 PROCEDURE — 94664 DEMO&/EVAL PT USE INHALER: CPT

## 2019-02-04 PROCEDURE — 99231 PR SUBSEQUENT HOSPITAL CARE,LEVL I: ICD-10-PCS | Mod: ,,, | Performed by: NURSE PRACTITIONER

## 2019-02-04 PROCEDURE — 84134 ASSAY OF PREALBUMIN: CPT

## 2019-02-04 PROCEDURE — 82330 ASSAY OF CALCIUM: CPT

## 2019-02-04 PROCEDURE — 63600175 PHARM REV CODE 636 W HCPCS: Performed by: STUDENT IN AN ORGANIZED HEALTH CARE EDUCATION/TRAINING PROGRAM

## 2019-02-04 PROCEDURE — 63600175 PHARM REV CODE 636 W HCPCS: Performed by: SURGERY

## 2019-02-04 PROCEDURE — 80053 COMPREHEN METABOLIC PANEL: CPT

## 2019-02-04 PROCEDURE — 63600175 PHARM REV CODE 636 W HCPCS: Performed by: PHYSICIAN ASSISTANT

## 2019-02-04 PROCEDURE — 76937 US GUIDE VASCULAR ACCESS: CPT

## 2019-02-04 PROCEDURE — 99233 SBSQ HOSP IP/OBS HIGH 50: CPT | Mod: 24,GC,, | Performed by: SURGERY

## 2019-02-04 PROCEDURE — C9113 INJ PANTOPRAZOLE SODIUM, VIA: HCPCS | Performed by: PHYSICIAN ASSISTANT

## 2019-02-04 PROCEDURE — S0030 INJECTION, METRONIDAZOLE: HCPCS | Performed by: SURGERY

## 2019-02-04 PROCEDURE — 99231 SBSQ HOSP IP/OBS SF/LOW 25: CPT | Mod: ,,, | Performed by: NURSE PRACTITIONER

## 2019-02-04 PROCEDURE — 36569 INSJ PICC 5 YR+ W/O IMAGING: CPT

## 2019-02-04 PROCEDURE — 92526 ORAL FUNCTION THERAPY: CPT

## 2019-02-04 PROCEDURE — 99233 PR SUBSEQUENT HOSPITAL CARE,LEVL III: ICD-10-PCS | Mod: 24,GC,, | Performed by: SURGERY

## 2019-02-04 PROCEDURE — 83735 ASSAY OF MAGNESIUM: CPT

## 2019-02-04 PROCEDURE — 25000242 PHARM REV CODE 250 ALT 637 W/ HCPCS: Performed by: SURGERY

## 2019-02-04 PROCEDURE — 97110 THERAPEUTIC EXERCISES: CPT

## 2019-02-04 PROCEDURE — 25000003 PHARM REV CODE 250: Performed by: SURGERY

## 2019-02-04 PROCEDURE — 94761 N-INVAS EAR/PLS OXIMETRY MLT: CPT

## 2019-02-04 PROCEDURE — 99900035 HC TECH TIME PER 15 MIN (STAT)

## 2019-02-04 PROCEDURE — B4185 PARENTERAL SOL 10 GM LIPIDS: HCPCS | Performed by: STUDENT IN AN ORGANIZED HEALTH CARE EDUCATION/TRAINING PROGRAM

## 2019-02-04 PROCEDURE — 94640 AIRWAY INHALATION TREATMENT: CPT

## 2019-02-04 PROCEDURE — 85025 COMPLETE CBC W/AUTO DIFF WBC: CPT

## 2019-02-04 RX ORDER — ENOXAPARIN SODIUM 100 MG/ML
40 INJECTION SUBCUTANEOUS EVERY 24 HOURS
Status: DISCONTINUED | OUTPATIENT
Start: 2019-02-04 | End: 2019-02-13

## 2019-02-04 RX ORDER — ACETAZOLAMIDE 500 MG/5ML
250 INJECTION, POWDER, LYOPHILIZED, FOR SOLUTION INTRAVENOUS EVERY 12 HOURS
Status: COMPLETED | OUTPATIENT
Start: 2019-02-04 | End: 2019-02-04

## 2019-02-04 RX ORDER — FUROSEMIDE 10 MG/ML
40 INJECTION INTRAMUSCULAR; INTRAVENOUS 2 TIMES DAILY
Status: DISCONTINUED | OUTPATIENT
Start: 2019-02-04 | End: 2019-02-04

## 2019-02-04 RX ORDER — KETOROLAC TROMETHAMINE 15 MG/ML
15 INJECTION, SOLUTION INTRAMUSCULAR; INTRAVENOUS EVERY 6 HOURS PRN
Status: DISPENSED | OUTPATIENT
Start: 2019-02-04 | End: 2019-02-07

## 2019-02-04 RX ORDER — SODIUM CHLORIDE 0.9 % (FLUSH) 0.9 %
10 SYRINGE (ML) INJECTION EVERY 6 HOURS
Status: DISCONTINUED | OUTPATIENT
Start: 2019-02-04 | End: 2019-02-21

## 2019-02-04 RX ORDER — SODIUM CHLORIDE 0.9 % (FLUSH) 0.9 %
10 SYRINGE (ML) INJECTION
Status: DISCONTINUED | OUTPATIENT
Start: 2019-02-04 | End: 2019-02-21

## 2019-02-04 RX ADMIN — INSULIN ASPART 2 UNITS: 100 INJECTION, SOLUTION INTRAVENOUS; SUBCUTANEOUS at 11:02

## 2019-02-04 RX ADMIN — PANTOPRAZOLE SODIUM 40 MG: 40 INJECTION, POWDER, LYOPHILIZED, FOR SOLUTION INTRAVENOUS at 08:02

## 2019-02-04 RX ADMIN — INSULIN ASPART 2 UNITS: 100 INJECTION, SOLUTION INTRAVENOUS; SUBCUTANEOUS at 08:02

## 2019-02-04 RX ADMIN — METRONIDAZOLE 500 MG: 500 INJECTION, SOLUTION INTRAVENOUS at 06:02

## 2019-02-04 RX ADMIN — KETOROLAC TROMETHAMINE 15 MG: 15 INJECTION, SOLUTION INTRAMUSCULAR; INTRAVENOUS at 12:02

## 2019-02-04 RX ADMIN — IPRATROPIUM BROMIDE AND ALBUTEROL SULFATE 3 ML: .5; 3 SOLUTION RESPIRATORY (INHALATION) at 01:02

## 2019-02-04 RX ADMIN — SOYBEAN OIL 250 ML: 20 INJECTION, SOLUTION INTRAVENOUS at 10:02

## 2019-02-04 RX ADMIN — MICONAZOLE NITRATE: 20 OINTMENT TOPICAL at 08:02

## 2019-02-04 RX ADMIN — ENOXAPARIN SODIUM 40 MG: 100 INJECTION SUBCUTANEOUS at 04:02

## 2019-02-04 RX ADMIN — IPRATROPIUM BROMIDE AND ALBUTEROL SULFATE 3 ML: .5; 3 SOLUTION RESPIRATORY (INHALATION) at 09:02

## 2019-02-04 RX ADMIN — HYDROMORPHONE HYDROCHLORIDE 0.5 MG: 1 INJECTION, SOLUTION INTRAMUSCULAR; INTRAVENOUS; SUBCUTANEOUS at 08:02

## 2019-02-04 RX ADMIN — HYDROMORPHONE HYDROCHLORIDE 0.5 MG: 1 INJECTION, SOLUTION INTRAMUSCULAR; INTRAVENOUS; SUBCUTANEOUS at 11:02

## 2019-02-04 RX ADMIN — KETOROLAC TROMETHAMINE 15 MG: 15 INJECTION, SOLUTION INTRAMUSCULAR; INTRAVENOUS at 08:02

## 2019-02-04 RX ADMIN — KETOROLAC TROMETHAMINE 15 MG: 15 INJECTION, SOLUTION INTRAMUSCULAR; INTRAVENOUS at 03:02

## 2019-02-04 RX ADMIN — MAGNESIUM SULFATE HEPTAHYDRATE 1 G: 500 INJECTION, SOLUTION INTRAMUSCULAR; INTRAVENOUS at 02:02

## 2019-02-04 RX ADMIN — INSULIN ASPART 1 UNITS: 100 INJECTION, SOLUTION INTRAVENOUS; SUBCUTANEOUS at 08:02

## 2019-02-04 RX ADMIN — HYDROMORPHONE HYDROCHLORIDE 0.5 MG: 1 INJECTION, SOLUTION INTRAMUSCULAR; INTRAVENOUS; SUBCUTANEOUS at 02:02

## 2019-02-04 RX ADMIN — POTASSIUM CHLORIDE: 2 INJECTION, SOLUTION, CONCENTRATE INTRAVENOUS at 10:02

## 2019-02-04 RX ADMIN — HYDROMORPHONE HYDROCHLORIDE 0.5 MG: 1 INJECTION, SOLUTION INTRAMUSCULAR; INTRAVENOUS; SUBCUTANEOUS at 12:02

## 2019-02-04 RX ADMIN — HYDROMORPHONE HYDROCHLORIDE 0.5 MG: 1 INJECTION, SOLUTION INTRAMUSCULAR; INTRAVENOUS; SUBCUTANEOUS at 09:02

## 2019-02-04 RX ADMIN — HYDROMORPHONE HYDROCHLORIDE 0.5 MG: 1 INJECTION, SOLUTION INTRAMUSCULAR; INTRAVENOUS; SUBCUTANEOUS at 06:02

## 2019-02-04 RX ADMIN — CIPROFLOXACIN 400 MG: 2 INJECTION, SOLUTION INTRAVENOUS at 10:02

## 2019-02-04 RX ADMIN — HYDROMORPHONE HYDROCHLORIDE 0.5 MG: 1 INJECTION, SOLUTION INTRAMUSCULAR; INTRAVENOUS; SUBCUTANEOUS at 10:02

## 2019-02-04 RX ADMIN — IPRATROPIUM BROMIDE AND ALBUTEROL SULFATE 3 ML: .5; 3 SOLUTION RESPIRATORY (INHALATION) at 07:02

## 2019-02-04 RX ADMIN — HYDROMORPHONE HYDROCHLORIDE 0.5 MG: 1 INJECTION, SOLUTION INTRAMUSCULAR; INTRAVENOUS; SUBCUTANEOUS at 03:02

## 2019-02-04 RX ADMIN — METRONIDAZOLE 500 MG: 500 INJECTION, SOLUTION INTRAVENOUS at 02:02

## 2019-02-04 RX ADMIN — ACETAZOLAMIDE SODIUM 250 MG: 500 INJECTION, POWDER, LYOPHILIZED, FOR SOLUTION INTRAVENOUS at 10:02

## 2019-02-04 RX ADMIN — CIPROFLOXACIN 400 MG: 2 INJECTION, SOLUTION INTRAVENOUS at 09:02

## 2019-02-04 RX ADMIN — METRONIDAZOLE 500 MG: 500 INJECTION, SOLUTION INTRAVENOUS at 09:02

## 2019-02-04 RX ADMIN — HYDROMORPHONE HYDROCHLORIDE 0.5 MG: 1 INJECTION, SOLUTION INTRAMUSCULAR; INTRAVENOUS; SUBCUTANEOUS at 04:02

## 2019-02-04 RX ADMIN — VANCOMYCIN HYDROCHLORIDE 750 MG: 750 INJECTION, POWDER, LYOPHILIZED, FOR SOLUTION INTRAVENOUS at 11:02

## 2019-02-04 RX ADMIN — INSULIN ASPART 1 UNITS: 100 INJECTION, SOLUTION INTRAVENOUS; SUBCUTANEOUS at 03:02

## 2019-02-04 RX ADMIN — ACETAZOLAMIDE SODIUM 250 MG: 500 INJECTION, POWDER, LYOPHILIZED, FOR SOLUTION INTRAVENOUS at 09:02

## 2019-02-04 RX ADMIN — FUROSEMIDE 40 MG: 10 INJECTION, SOLUTION INTRAVENOUS at 08:02

## 2019-02-04 NOTE — ASSESSMENT & PLAN NOTE
63 y.o. female with hx of asthma, COPD, CAD, DM, HTN, CVA 2012 with R side deficits, HFpEF, severe debility, lap converted to open appendectomy 8/2018 complicated by cdiff, failure to thrive.  Hx of difficult intubation and delayed emergence, CO2 narcosis needing ICU stay.  Hx of PRN O2 at night. Patient now s/p exploratory laparotomy, washout, and pelvic abscess drainage on 1/25/19.    Post-operatively, on initial evaluation in PACU, patient hypotensive to 60s/40s, on BiPap. Currently fluid resuscitating with 3rd liter of LR, responding well to phenylephrine, starting phenylephrine infusion (tachycardic to ~118). ABG 7.11/49/116/16, BE -14, on bipap 14/5, 35% FiO2. Intubated on 1/26 for airway protection. Extubated 1/28. Required CRRT on 1/26 for metabolic acidosis, now off.     Neuro:  Sedation: None  Pain: multimodal     CV:  - HDS  - Off pressors  - Lactate 3.4 --> 2.3 --> 2.6->2.2->1.8->2.1 (no longer trending)  - 1 unit pRBC 2/1  - 1 unit platelets 1/31  - H/H 8.1/24.8    Pulm:   - Extubated 1/28/19  - on RA  - prn CXR  - prn ABG    Renal:  Trend BUN/Cr: 29/0.5  Monitor Urine output (3.7L over past 24 hours)  Nephro following, appreciate assistance  Receiving lasix 40 mg bid for diuresis;  cc/hr overnight    ID:  AF  1/24 blood cultures: gram positive cocci in clusters resembling staph  1/25 blood cultures: NGTD  1/25 operative cultures pending, ngtd thus far  - on cipro/metro/vanc  -wound vac removed by wound care 1/31; tolerated well      FEN/GI:  TPN @ 75  Replace lytes PRN  Last lactate 2.1; no longer trending  Lasix 40 mg bid  Trend Alk phos, LFTs  -Abdominal U/S 2/1: few gallstones, no acute cholecystitis     Endo:  Patient with IDDM2, on detemir 20u BID at home  1/27-1/28 with hyperglycemia into the 500s on insulin gtt @ 100u/hr  1/28-1/29 with hypoglycemia requiring d10 infusion @ 125cc/hr  With introduction of TPN has now come off of D10 with glucose levels stable; continues on insulin  gtt    Heme:  Post-op hgb 8.5   Plt up to 142 on most recent labs, heparin held  HIT panel negative  No signs of bleeding at this time    Dispo: Cont ICU care. Stable for possible stepdown? Will discuss with primary team.  Primary: SICU/general surgery

## 2019-02-04 NOTE — PLAN OF CARE
"Problem: SLP Goal  Goal: SLP Goal  Speech Language Pathology Goals  Goals expected to be met by 2/6/19  1. Pt will participate in ongoing swallow assessment  2. Educate Pt and family on S/S aspiration and aspiration precautions         Outcome: Ongoing (interventions implemented as appropriate)  Pt seen for ongoing swallow assessment. Pt with persistent palatal swelling, c/o intermittent "swollen" feeling in tongue and neck. REC: Continue NPO with temporary, alternative means nutrition/hydration/medication and ST to continue to follow for ongoing assessment. Occasional ice chips sparingly (2 or 3 chips) for comfort ok provided good oral care and strict aspiration precautions.  Findings/recs reviewed with Pt and nurse. THank you.    NAVID Cobb., [facility]-SLP  Speech-Language Pathology  Pager: [phone]  2/4/2019        "

## 2019-02-04 NOTE — PROGRESS NOTES
[facility]  Critical Care - Surgery  Progress Note    Patient Name: Sheryl Martines  MRN: [id number]  Admission Date: 1/23/2019  Hospital Length of Stay: 11 days  Code Status: Full Code  Attending Provider: Monster Laurent MD  Primary Care Provider: Primary Doctor No   Principal Problem: Pelvic abscess in female    Subjective:     Hospital/ICU Course:  No notes on file    Interval History/Significant Events: No acute events overnight. Pain controlled with prn pain medication. Continues with 40 mg lasix bid for diuresis; UOP  cc/hr overnight. Platelet numbers slowly recovering (142 this am on labs).    Follow-up For: Procedure(s) (LRB):  LAPAROTOMY, EXPLORATORY (N/A)  INCISION AND DRAINAGE, ABSCESS-  drainage of pelvic abscess (N/A)  EXCISION, ABSCESS- drainage abdominal wall abscess (N/A)  APPLICATION, WOUND VAC (N/A)    Post-Operative Day: 9 Days Post-Op    Objective:     Vital Signs (Most Recent):  Temp: 98.2 °F (36.8 °C) (02/04/19 0300)  Pulse: 100 (02/04/19 0330)  Resp: 14 (02/04/19 0330)  BP: (!) 141/77 (02/04/19 0300)  SpO2: 97 % (02/04/19 0330) Vital Signs (24h Range):  Temp:  [98 °F (36.7 °C)-98.5 °F (36.9 °C)] 98.2 °F (36.8 °C)  Pulse:  [] 100  Resp:  [5-27] 14  SpO2:  [92 %-100 %] 97 %  BP: (100-159)/(54-78) 141/77     Weight: 71.2 kg (157 lb)  Body mass index is 29.66 kg/m².      Intake/Output Summary (Last 24 hours) at 2/4/2019 0708  Last data filed at 2/4/2019 0600  Gross per 24 hour   Intake 3115 ml   Output 4019 ml   Net -904 ml       Physical Exam   Constitutional: She appears well-developed and well-nourished. No distress.   HENT:   Head: Normocephalic.   Eyes: Pupils are equal, round, and reactive to light.   Cardiovascular: Regular rate and rhythm.   Pulmonary/Chest: No respiratory distress. She has no wheezes.   Extubated on RA.   Abdominal: Soft. She exhibits no distension.   Neurological:   lethargic   Skin: Skin is warm and dry.       Vents: Not on  Ventilator.      Lines/Drains/Airways     Central Venous Catheter Line                 Port A Cath Single Lumen 01/23/19 1400 right subclavian 11 days         Trialysis (Dialysis) Catheter 01/26/19 1200 right internal jugular 8 days          Drain                 Closed/Suction Drain 01/25/19 1020 Right;Ventral Abdomen Bulb 19 Fr. 9 days         Urethral Catheter 01/25/19 0934 Straight-tip;Non-latex 16 Fr. 9 days                Significant Labs:    CBC/Anemia Profile:  Recent Labs   Lab 02/02/19 1930 02/03/19 0749 02/03/19 2003   WBC 8.34 9.14 10.27   HGB 8.4* 8.4* 8.1*   HCT 24.6* 25.7* 24.8*   * 106* 142*   MCV 83 83 85   RDW 18.3* 18.6* 19.1*        Chemistries:  Recent Labs   Lab 02/02/19 1930 02/03/19  0150 02/03/19 0749 02/03/19  1600 02/03/19 2003 02/03/19  2350     --   --  141  --  140  --    K 2.9*  --  3.3* 3.8  --  4.8  --    CL 97  --   --  99  --  98  --    CO2 35*  --   --  35*  --  35*  --    BUN 26*  --   --  27*  --  29*  --    CREATININE 0.6  --   --  0.5  --  0.5  --    CALCIUM 7.9*  --   --  8.1*  --  8.0*  --    ALBUMIN 1.6*  --   --  1.6*  --  1.5*  --    PROT 4.1*  --   --  4.3*  --  4.4*  --    BILITOT 4.2*  --   --  4.4*  --  4.0*  --    ALKPHOS 1,081*  --   --  1,070*  --  1,098*  --    *  --   --  148*  --  146*  --    *  --   --  126*  --  154*  --    MG  --    < >  --  2.1  2.1 2.3  --  1.9   PHOS  --    < >  --  2.5*  2.5* 2.5*  --  2.8    < > = values in this interval not displayed.         Significant Imaging:  I have reviewed all pertinent imaging results/findings within the past 24 hours.    Assessment/Plan:     * Pelvic abscess in female    63 y.o. female with hx of asthma, COPD, CAD, DM, HTN, CVA 2012 with R side deficits, HFpEF, severe debility, lap converted to open appendectomy 8/2018 complicated by cdiff, failure to thrive.  Hx of difficult intubation and delayed emergence, CO2 narcosis needing ICU stay.  Hx of PRN O2 at night. Patient now  s/p exploratory laparotomy, washout, and pelvic abscess drainage on 1/25/19.    Post-operatively, on initial evaluation in PACU, patient hypotensive to 60s/40s, on BiPap. Currently fluid resuscitating with 3rd liter of LR, responding well to phenylephrine, starting phenylephrine infusion (tachycardic to ~118). ABG 7.11/49/116/16, BE -14, on bipap 14/5, 35% FiO2. Intubated on 1/26 for airway protection. Extubated 1/28. Required CRRT on 1/26 for metabolic acidosis, now off.     Neuro:  Sedation: None  Pain: multimodal     CV:  - HDS  - Off pressors  - Lactate 3.4 --> 2.3 --> 2.6->2.2->1.8->2.1 (no longer trending)  - 1 unit pRBC 2/1  - 1 unit platelets 1/31  - H/H 8.1/24.8    Pulm:   - Extubated 1/28/19  - on RA  - prn CXR  - prn ABG    Renal:  Trend BUN/Cr: 29/0.5  Monitor Urine output (3.7L over past 24 hours)  Nephro following, appreciate assistance  Receiving lasix 40 mg bid for diuresis;  cc/hr overnight    ID:  AF  1/24 blood cultures: gram positive cocci in clusters resembling staph  1/25 blood cultures: NGTD  1/25 operative cultures pending, ngtd thus far  - on cipro/metro/vanc  -wound vac removed by wound care 1/31; tolerated well      FEN/GI:  TPN @ 75  Replace lytes PRN  Last lactate 2.1; no longer trending  Lasix 40 mg bid  Trend Alk phos, LFTs  -Abdominal U/S 2/1: few gallstones, no acute cholecystitis     Endo:  Patient with IDDM2, on detemir 20u BID at home  1/27-1/28 with hyperglycemia into the 500s on insulin gtt @ 100u/hr  1/28-1/29 with hypoglycemia requiring d10 infusion @ 125cc/hr  With introduction of TPN has now come off of D10 with glucose levels stable; continues on insulin gtt    Heme:  Post-op hgb 8.5   Plt up to 142 on most recent labs, heparin held  HIT panel negative  No signs of bleeding at this time    Dispo: Cont ICU care. Stable for possible stepdown? Will discuss with primary team.  Primary: SICU/general surgery            Critical care was time spent personally by me on the  following activities: development of treatment plan with patient or surrogate and bedside caregivers, discussions with consultants, evaluation of patient's response to treatment, examination of patient, ordering and performing treatments and interventions, ordering and review of laboratory studies, ordering and review of radiographic studies, pulse oximetry, re-evaluation of patient's condition.  This critical care time did not overlap with that of any other provider or involve time for any procedures.     Chyna Ott MD  Critical Care - Surgery  [facility]

## 2019-02-04 NOTE — PLAN OF CARE
Problem: Physical Therapy Goal  Goal: Physical Therapy Goal  Goals to be met by: 19    Patient will increase functional independence with mobility by performin. Supine to sit with Moderate Assistance -not met  2. Sit to stand transfer with Maximum Assistance -not met  3. Bed to chair transfer with Maximum Assistance -not met  4. Sitting at edge of bed x10 minutes with Contact Guard Assistance -not met  5. Lower extremity exercise program x10 reps, with assistance as needed - met      Goals updated for content and are appropriate. Tita Laws, PT 2019

## 2019-02-04 NOTE — PT/OT/SLP PROGRESS
Physical Therapy Treatment    Patient Name:  Sheryl Martines   MRN:  [id number]    Recommendations:     Discharge Recommendations:  nursing facility, skilled   Discharge Equipment Recommendations: (will determine DME Needs closer to discharge)   Barriers to discharge: Decreased caregiver support family may not be able to assist pt at current functional level.     Assessment:     Sheryl Martines is a 63 y.o. female admitted with a medical diagnosis of Pelvic abscess in female.  She presents with the following impairments/functional limitations:  weakness, impaired functional mobilty, impaired endurance, impaired balance, decreased lower extremity function pt ani treatment fair and will benefit from skilled PT 2x/wk to progress as tolerated. Pt will need SNF placement when medically stable. Pt is s/p exp lap, drainage deep pelvic abscess, drainage abdominal wall abscess, wound vac 1/25/19.        Rehab Prognosis: Fair; patient would benefit from acute skilled PT services to address these deficits and reach maximum level of function.    Recent Surgery: Procedure(s) (LRB):  LAPAROTOMY, EXPLORATORY (N/A)  INCISION AND DRAINAGE, ABSCESS-  drainage of pelvic abscess (N/A)  EXCISION, ABSCESS- drainage abdominal wall abscess (N/A)  APPLICATION, WOUND VAC (N/A) 10 Days Post-Op    Plan:     During this hospitalization, patient to be seen 2 x/week to address the identified rehab impairments via therapeutic activities, therapeutic exercises, neuromuscular re-education and progress toward the following goals:    · Plan of Care Expires:  02/24/19    Subjective     Chief Complaint: pt c/o pain during treatment.   Patient/Family Comments/goals:  To get better and go home.   Pain/Comfort:  · Pain Rating 1: 8/10  · Location - Side 1: Right  · Location 1: (side of body)  · Pain Rating Post-Intervention 1: 8/10      Objective:     Communicated with nurse prior to session.  Patient found supine with  telemetry, pulse ox (continuous),  blood pressure cuff, central line, nieves catheter  upon PT entry to room.     General Precautions: Standard, fall, aspiration, contact   Orthopedic Precautions:    Braces:       Functional Mobility:  · Not tested. Pt had just gotten back in bed from sitting in cardiac chair.      AM-PAC 6 CLICK MOBILITY  Turning over in bed (including adjusting bedclothes, sheets and blankets)?: 2  Sitting down on and standing up from a chair with arms (e.g., wheelchair, bedside commode, etc.): 1  Moving from lying on back to sitting on the side of the bed?: 2  Moving to and from a bed to a chair (including a wheelchair)?: 1  Need to walk in hospital room?: 1  Climbing 3-5 steps with a railing?: 1  Basic Mobility Total Score: 8       Therapeutic Activities and Exercises:   pt performed AAROM there exer x 4 extremities x 10 reps in supine. Pt had rest period as needed.     Patient left supine with all lines intact and call button in reach..    GOALS:   Multidisciplinary Problems     Physical Therapy Goals        Problem: Physical Therapy Goal    Goal Priority Disciplines Outcome Goal Variances Interventions   Physical Therapy Goal     PT, PT/OT      Description:  Goals to be met by: 19    Patient will increase functional independence with mobility by performin. Supine to sit with Moderate Assistance -not met  2. Sit to stand transfer with Maximum Assistance -not met  3. Bed to chair transfer with Maximum Assistance -not met  4. Sitting at edge of bed x10 minutes with Contact Guard Assistance -not met  5. Lower extremity exercise program x10 reps, with assistance as needed - met 2/4                       Time Tracking:     PT Received On: 19  PT Start Time: 1053     PT Stop Time: 1115  PT Total Time (min): 22 min     Billable Minutes: Therapeutic Exercise 22 min    Treatment Type: Treatment  PT/PTA: PT     PTA Visit Number: 0     Tita Laws, PT  2019

## 2019-02-04 NOTE — CONSULTS
Double lumen PICC placed in left brachial vein of CHEYANNE, 39cm in length with 0cm exposed and 31cm arm circumference. Lot#[id number].

## 2019-02-04 NOTE — PROGRESS NOTES
"[facility]  General Surgery  Progress Note    Subjective:     History of Present Illness:  62 yo W with a history of HTN, COPD on home oxygen, HFpEF, IDDMII, CVA on plavix, HTN, debility after fall and broken hip, and PE who presents to the ED with a several month history of nausea, vomiting, inability to tolerate a diet and weight loss. She has had multiple admissions in the past few months for different ailments. She presents today with continued nausea, vomiting and abdominal pain that is mostly worse in the RLQ. During the examination, she states her pain was all over her abdomen because she was retching so much. She states that she has lost close to 40lbs since her surgery and that she only able to keep broth down. She had a lap converted to open appendectomy back in August 2018 that was complicated by a wound infection, C diff and failure to thrive. This seems to persists. She is now wheelchair bound (per her) and apparently lives in [place] but is here in [place] with her daughter.    She had a CT scan in the ED which revealed an irregular shaped rim enhancing fluid collection measuring at least 4.0 x 3.0 cm ight hemipelvis at the site of prior appendectomy. Surgery was consulted for further recommendations. She was also noted to have a "knot" at the RLQ incision site that is also tender.    Post-Op Info:  Procedure(s) (LRB):  LAPAROTOMY, EXPLORATORY (N/A)  INCISION AND DRAINAGE, ABSCESS-  drainage of pelvic abscess (N/A)  EXCISION, ABSCESS- drainage abdominal wall abscess (N/A)  APPLICATION, WOUND VAC (N/A)   10 Days Post-Op     Interval History:   Good UOP. NAEON. AFVSS.       Medications:  Continuous Infusions:   TPN ADULT CENTRAL LINE CUSTOM 60 mL/hr at 02/04/19 0600     Scheduled Meds:   albuterol-ipratropium  3 mL Nebulization Q6H    ciprofloxacin  400 mg Intravenous Q12H    fat emulsion 20%  250 mL Intravenous Daily    fluticasone-vilanterol  1 puff Inhalation Daily    " furosemide  40 mg Intravenous BID    metronidazole  500 mg Intravenous Q8H    pantoprozole (PROTONIX) IV  40 mg Intravenous Q12H    vancomycin (VANCOCIN) IVPB  750 mg Intravenous Q24H     PRN Meds:sodium chloride, sodium chloride, albuterol sulfate, dextrose 50%, diclofenac sodium, diphenhydrAMINE, glucagon (human recombinant), HYDROmorphone, insulin aspart U-100, ketorolac, ondansetron, promethazine (PHENERGAN) IVPB, sodium chloride 0.9%, sodium chloride 0.9%, white petrolatum     Review of patient's allergies indicates:   Allergen Reactions    Ace inhibitors Swelling    Carvedilol      Other reaction(s): Other (See Comments)  blister    Hydralazine analogues     Metformin Nausea And Vomiting    Tetracycline Itching    Tetracyclines Swelling    Travatan (with benzalkonium) [travoprost (benzalkonium)]     Travoprost Itching     Other reaction(s): Other (See Comments)  Blurry vision     Objective:     Vital Signs (Most Recent):  Temp: 98.2 °F (36.8 °C) (02/04/19 0300)  Pulse: 100 (02/04/19 0330)  Resp: 14 (02/04/19 0330)  BP: (!) 141/77 (02/04/19 0300)  SpO2: 97 % (02/04/19 0330) Vital Signs (24h Range):  Temp:  [98 °F (36.7 °C)-98.5 °F (36.9 °C)] 98.2 °F (36.8 °C)  Pulse:  [] 100  Resp:  [5-27] 14  SpO2:  [92 %-100 %] 97 %  BP: (100-159)/(54-78) 141/77     Weight: 71.2 kg (157 lb)  Body mass index is 29.66 kg/m².    Intake/Output - Last 3 Shifts       02/02 0700 - 02/03 0659 02/03 0700 - 02/04 0659 02/04 0700 - 02/05 0659    I.V. (mL/kg) 139.5 (2) 1171 (16.4)     IV Piggyback 2100 100     TPN 1566.6 1844     Total Intake(mL/kg) 3806.1 (53.5) 3115 (43.8)     Urine (mL/kg/hr) 5175 (3) 3995 (2.3)     Drains 560 144     Total Output 5759 4133     Net -4302.9 -7098                  Physical Exam   Constitutional: She appears well-developed. No distress.   HENT:   Head: Normocephalic and atraumatic.   Pulmonary/Chest: Effort normal. No stridor. No respiratory distress.   Coarse breath sounds bilaterally    Abdominal: Soft. Bowel sounds are decreased. There is tenderness. There is no guarding.       Open wounds covered with adequate dressing. No signs of infection. LUCIANO drain with serous output.   Musculoskeletal: She exhibits no edema.   Neurological: She is alert.   Skin: Skin is warm and dry.   Psychiatric: She has a normal mood and affect.       Significant Labs:  CBC:   Recent Labs   Lab 02/03/19 2003   WBC 10.27   RBC 2.92*   HGB 8.1*   HCT 24.8*   *   MCV 85   MCH 27.7   MCHC 32.7     CMP:   Recent Labs   Lab 02/03/19 2003   GLU 91   CALCIUM 8.0*   ALBUMIN 1.5*   PROT 4.4*      K 4.8   CO2 35*   CL 98   BUN 29*   CREATININE 0.5   ALKPHOS 1,098*   *   *   BILITOT 4.0*       Significant Diagnostics:  None    Assessment/Plan:     * Pelvic abscess in female    -S/p ex-lap with drainage of abscess. On antibiotics.     Multiple skin tears    -Wound care following     Depression    -Hold home meds for now until tolerating po     Hypophosphatemia    -Replace as needed     Debility    -PT/OT; history of fall with non displaced oblique right tibia fracture at metaphysis into diaphysis. Was wearing brace.  -Severely debilited       Generalized abdominal pain    62 yo female presenting with abdominal pain, nausea, elevated lactate s/p open drainage pelvic abscess 1/25     -Continue broad spec abx, f/u cultures- abdominal with bacteroides, cont cipro/flagyl plan for 4 week course  blood cx from admit MRSA x2 plan for 2 week course vanc, repeat BCx NGTD  -NPO for now until seen by speech  -WOCN following, appreciate help with wounds/blisters, blisters appear to be 2/2 severe anasarca  -US with few small gallstones and no evidence acute cholecystitis - LFT elevation likely 2/2 TPN   -Continue ICU care, stepdown soon, discuss timing with staff     Moderate malnutrition    -Continue TPN  -Speech eval today; failed bedside swallow     Essential hypertension    -Hold home meds for now      Gastroesophageal reflux disease without esophagitis    -Continue BID IV protonix     (HFpEF) heart failure with preserved ejection fraction    Last echo 8/2018 with no WMAs, grade 1DD, EF 60%  -Strict I/O, daily weights  -Continue diuresis; will go to daily lasix given elevation in CO2     Moderate asthma without complication    -Continue with scheduled duonebs     Elevated troponin    -Cardiology consulted. On ASA/plavix at home  -Mild elevation; rectal aspirin to start today     CAD (coronary artery disease)    -Cardiology consulted for EKG changes, mild elevation in troponin, type II MI, on ASA/clopidogrel at home  -Will give rectal aspirin today     Type 2 diabetes mellitus    -Currently on insulin gtt  -Will switch to SSI when appropriate         Sudhakar López MD  General Surgery  [facility]

## 2019-02-04 NOTE — PROGRESS NOTES
"[facility]  Adult Nutrition  Progress Note    SUMMARY       Recommendations  Recommendation/Intervention:   Continue current TPN + lipids - meeting needs.   RD to monitor.    Goals: Patient to meet > 85% EEN and EPN  Nutrition Goal Status: goal met  Communication of RD Recs: (POC)    Reason for Assessment  Reason For Assessment: RD follow-up  Diagnosis: infection/sepsis(pelvic abscess)  Relevant Medical History: COPD, CVA, DM, HTN, HLD  General Information Comments: Extubated . On TPN + lipids. (Patient continues to have both fat and muscle wasting. NFPE completed , patient with moderate malnutrition.)  Nutrition Discharge Planning: Adequate nutrition via TPN    Nutrition Risk Screen  Nutrition Risk Screen: no indicators present    Nutrition/Diet History  Typical Food/Fluid Intake: Per MD note, patient reported poor PO itnake PTA 2/2 abdominal pain and N/V.  Spiritual, Cultural Beliefs, [employer]al Practices, Values that Affect Care: no  Factors Affecting Nutritional Intake: NPO    Anthropometrics  Temp: 97.8 °F (36.6 °C)  Height: 5' 1" (154.9 cm)  Height (inches): 61 in  Weight Method: Bed Scale  Weight: 71.2 kg (157 lb)  Weight (lb): 157 lb  Ideal Body Weight (IBW), Female: 105 lb  % Ideal Body Weight, Female (lb): 149.52 lb  BMI (Calculated): 29.7  BMI Grade: 25 - 29.9 - overweight  Usual Body Weight (UBW), k.6 kg(per chart review 2018)  % Usual Body Weight: 87.46  % Weight Change From Usual Weight: -12.73 %    Lab/Procedures/Meds  Pertinent Labs Reviewed: reviewed  Pertinent Labs Comments: BUN 29, Glu 154, Ca 8.2, Alb 1.5  Pertinent Medications Reviewed: reviewed  Pertinent Medications Comments: pantoprazole    Estimated/Assessed Needs  Weight Used For Calorie Calculations: 71.2 kg (156 lb 15.5 oz)  Energy Calorie Requirements (kcal): 1505 kcal/day  Energy Need Method: Crowley-St Jeor(x 1.25)  Protein Requirements:  g/day(1.2-1.4 g/kg)  Weight Used For Protein Calculations: " 71.2 kg (156 lb 15.5 oz)  Fluid Requirements (mL): 1 mL/kcal or per MD  Estimated Fluid Requirement Method: RDA Method  RDA Method (mL): 1505  CHO Requirement: 50% total kcal    Nutrition Prescription Ordered  Current Diet Order: NPO  Current Nutrition Support Formula Ordered: (Custom TPN 105g AA and 175g Dextrose + lipids)  Current Nutrition Support Rate Ordered: 60 (ml)  Current Nutrition Support Frequency Ordered: mL/hr    Evaluation of Received Nutrient/Fluid Intake  Parenteral Calories (kcal): 1015  Parenteral Protein (gm): 105  Parenteral Fluid (mL): 1440  Lipid Calories (kcals): 500 kcals  GIR (Glucose Infusion Rate) (mg/kg/min): 1.71 mg/kg/min  Total Calories (kcal): 1515  % Kcal Needs: 101  % Protein Needs: 105  I/O: +18.2L since admit  Energy Calories Required: meeting needs  Protein Required: meeting needs  Fluid Required: (per MD)  Comments: LBM 1/23  Tolerance: tolerating  % Intake of Estimated Energy Needs: 75 - 100 %  % Meal Intake: NPO    Nutrition Risk  Level of Risk/Frequency of Follow-up: low(1x/week)     Assessment and Plan  Moderate malnutrition    Contributing Nutrition Diagnosis  Malnutrition in the context of Acute Illness/Injury    Related to (etiology):  Decreased PO intake 2/2 abdominal pain and N/V    Signs and Symptoms (as evidenced by):  Energy Intake: <75% of estimated energy requirement for several months per patient  Body Fat Depletion: mild depletion of orbitals and triceps   Muscle Mass Depletion: mild and moderate depletion of temples, clavicle region and lower extremities   Weight Loss: 12.7% x 5 months   Fluid Accumulation: mild and moderate    Interventions/Recommendations (treatment strategy):  Collaboration and referral of nutrition care     Nutrition Diagnosis Status:  Continues     Monitor and Evaluation  Food and Nutrient Intake: energy intake, parenteral nutrition intake  Food and Nutrient Adminstration: enteral and parenteral nutrition administration  Anthropometric  Measurements: weight, weight change  Biochemical Data, Medical Tests and Procedures: electrolyte and renal panel, gastrointestinal profile, glucose/endocrine profile, inflammatory profile  Nutrition-Focused Physical Findings: overall appearance     Malnutrition Assessment  Orbital Region (Subcutaneous Fat Loss): mild depletion  Upper Arm Region (Subcutaneous Fat Loss): mild depletion   [place] Region (Muscle Loss): moderate depletion  Clavicle Bone Region (Muscle Loss): mild depletion  Anterior Thigh Region (Muscle Loss): mild depletion   Edema (Fluid Accumulation): 2-->mild     Nutrition Follow-Up  RD Follow-up?: Yes

## 2019-02-04 NOTE — SUBJECTIVE & OBJECTIVE
Interval History/Significant Events: No acute events overnight. Pain controlled with prn pain medication. Continues with 40 mg lasix bid for diuresis; UOP  cc/hr overnight. Platelet numbers slowly recovering (142 this am on labs).    Follow-up For: Procedure(s) (LRB):  LAPAROTOMY, EXPLORATORY (N/A)  INCISION AND DRAINAGE, ABSCESS-  drainage of pelvic abscess (N/A)  EXCISION, ABSCESS- drainage abdominal wall abscess (N/A)  APPLICATION, WOUND VAC (N/A)    Post-Operative Day: 9 Days Post-Op    Objective:     Vital Signs (Most Recent):  Temp: 98.2 °F (36.8 °C) (02/04/19 0300)  Pulse: 100 (02/04/19 0330)  Resp: 14 (02/04/19 0330)  BP: (!) 141/77 (02/04/19 0300)  SpO2: 97 % (02/04/19 0330) Vital Signs (24h Range):  Temp:  [98 °F (36.7 °C)-98.5 °F (36.9 °C)] 98.2 °F (36.8 °C)  Pulse:  [] 100  Resp:  [5-27] 14  SpO2:  [92 %-100 %] 97 %  BP: (100-159)/(54-78) 141/77     Weight: 71.2 kg (157 lb)  Body mass index is 29.66 kg/m².      Intake/Output Summary (Last 24 hours) at 2/4/2019 0708  Last data filed at 2/4/2019 0600  Gross per 24 hour   Intake 3115 ml   Output 4019 ml   Net -904 ml       Physical Exam   Constitutional: She appears well-developed and well-nourished. No distress.   HENT:   Head: Normocephalic.   Eyes: Pupils are equal, round, and reactive to light.   Cardiovascular: Regular rate and rhythm.   Pulmonary/Chest: No respiratory distress. She has no wheezes.   Extubated on RA.   Abdominal: Soft. She exhibits no distension.   Neurological:   lethargic   Skin: Skin is warm and dry.       Vents: Not on Ventilator.      Lines/Drains/Airways     Central Venous Catheter Line                 Port A Cath Single Lumen 01/23/19 1400 right subclavian 11 days         Trialysis (Dialysis) Catheter 01/26/19 1200 right internal jugular 8 days          Drain                 Closed/Suction Drain 01/25/19 1020 Right;Ventral Abdomen Bulb 19 Fr. 9 days         Urethral Catheter 01/25/19 0934 Straight-tip;Non-latex 16  Fr. 9 days                Significant Labs:    CBC/Anemia Profile:  Recent Labs   Lab 02/02/19 1930 02/03/19 0749 02/03/19 2003   WBC 8.34 9.14 10.27   HGB 8.4* 8.4* 8.1*   HCT 24.6* 25.7* 24.8*   * 106* 142*   MCV 83 83 85   RDW 18.3* 18.6* 19.1*        Chemistries:  Recent Labs   Lab 02/02/19 1930 02/03/19  0150 02/03/19 0749 02/03/19 1600 02/03/19 2003 02/03/19  2350     --   --  141  --  140  --    K 2.9*  --  3.3* 3.8  --  4.8  --    CL 97  --   --  99  --  98  --    CO2 35*  --   --  35*  --  35*  --    BUN 26*  --   --  27*  --  29*  --    CREATININE 0.6  --   --  0.5  --  0.5  --    CALCIUM 7.9*  --   --  8.1*  --  8.0*  --    ALBUMIN 1.6*  --   --  1.6*  --  1.5*  --    PROT 4.1*  --   --  4.3*  --  4.4*  --    BILITOT 4.2*  --   --  4.4*  --  4.0*  --    ALKPHOS 1,081*  --   --  1,070*  --  1,098*  --    *  --   --  148*  --  146*  --    *  --   --  126*  --  154*  --    MG  --    < >  --  2.1  2.1 2.3  --  1.9   PHOS  --    < >  --  2.5*  2.5* 2.5*  --  2.8    < > = values in this interval not displayed.         Significant Imaging:  I have reviewed all pertinent imaging results/findings within the past 24 hours.

## 2019-02-04 NOTE — PT/OT/SLP PROGRESS
"Speech Language Pathology Treatment    Patient Name:  Sheryl Martines   MRN:  [id number]  Admitting Diagnosis: Pelvic abscess in female    Recommendations:                 General Recommendations:  Dysphagia therapy, consult with Dentist/oral surgeon   Diet recommendations:  NPO, Liquid Diet Level: NPO   Aspiration Precautions: Continue alternate means of nutrition, Frequent oral care, Ice chips sparingly and Strict aspiration precautions Continue to monitor for signs and symptoms of aspiration and discontinue oral feeding should you notice any of the following: watery eyes, reddened facial area, wet vocal quality, increased work of breathing, change in respiratory status, increased congestion, coughing, fever, etc.  General Precautions: Standard, aspiration, fall, contact  Communication strategies:  provide increased time to answer and go to room if call light pushed    Subjective     SLP reviewed Pt with MD team, MD team aware of ongoing monitoring of palatal swelling, cleared for ice chips   SLP reviewed Pt with nurse  Patient explains, "Sometimes I can swallow and sometimes I need the suction"   Patient goals: to have ice     Pain/Comfort:  · Pain Rating 1: 0/10    Objective:     Has the patient been evaluated by SLP for swallowing?   Yes  Keep patient NPO? Yes   Current Respiratory Status: room air   Last CXR 1/30/2019: Central line lower SVC and there is a bio stimulator.  There is cardiomegaly moderate-severe edema pleural fluid and no change.    ENT Consult 1/31/19 appreciated: "This is a palatine huyen - normal variant of palatal anatomy.  Her discomfort is likely from the fact that she was intubated for 4 days and the huyen is irritated from the ETT (either that or related to the act of intubation).  Small area of irritation (likely from above), this will heal by self, may consider mouth rinses."    Pt seen for ongoing swallow assessment. Pt found awake in bed. HOB elevated. SLP noted cup of mouth wash with " toothette at bedside. Pt reported she was using mouthwash periodically t/o day 2/2 dry oral cavity. She denied burning or pain with use of mouthwash along palate surface. Pt with moderate swelling along palate and mildly decreased lingual strength and coordination upon review of the oral mechanism. She demonstrated clear vocal quality with mildly decreased articulatory precision at the conversational level.  Pt reported occasional difficulty swallowing secretions; however, was able to manage secretions without need for suctioning t/o session today. Pt reported some pain and irritation along palate when lingual surface touched it, then further explained she felt like it was not as painful as last week. Pt seen with ice chip slivers x4. Pt with delayed throat clear one of 4 trials. Pt with mildly delayed initiation of swallow with trials of ice chips. Additional trials held 2/2 aspiration risk.  Pt educated on SLP role, S/S aspiration, aspiration precautions and ongoing swallow assessment as irritation along palatine huyen improves. She verbalized understanding. No additional questions. Ultrasound a door as SLP discontinued session.      Assessment:     Sheryl Martines is a 63 y.o. female with an SLP diagnosis of risk for aspiraiton.  She presents with persistent palatal swelling, somewhat improved this service day. Ongoing swallow assessment warranted as swelling improves. Pt     Goals:   Multidisciplinary Problems     SLP Goals        Problem: SLP Goal    Goal Priority Disciplines Outcome   SLP Goal     SLP Ongoing (interventions implemented as appropriate)   Description:  Speech Language Pathology Goals  Goals expected to be met by 2/6/19  1. Pt will participate in ongoing swallow assessment  2. Educate Pt and family on S/S aspiration and aspiration precautions                          Plan:     · Patient to be seen:  3 x/week   · Plan of Care expires:  03/01/19  · Plan of Care reviewed with:  patient   · SLP  Follow-Up:  Yes       Discharge recommendations:  nursing facility, skilled     Time Tracking:     SLP Treatment Date:   02/04/19  Speech Start Time:  1545  Speech Stop Time:  1608     Speech Total Time (min):  23 min    Billable Minutes: Treatment Swallowing Dysfunction 23     RAJWINDER Cobb, [facility]SLP  Speech-Language Pathology  Pager: [phone]    02/04/2019

## 2019-02-04 NOTE — PROCEDURES
"Sheryl Martines is a 63 y.o. female patient.    Temp: 97.8 °F (36.6 °C) (02/04/19 1100)  Pulse: 91 (02/04/19 1400)  Resp: 17 (02/04/19 1400)  BP: (!) 145/70 (02/04/19 1400)  SpO2: 98 % (02/04/19 1400)  Weight: 71.2 kg (157 lb) (01/26/19 0900)  Height: 5' 1" (154.9 cm) (01/26/19 0900)    PICC  Date/Time: 2/4/2019 5:58 PM  Performed by: Sabrina Duron RN  Assisting provider: Brenda Knowles LPN  Time out: Immediately prior to procedure a time out was called to verify the correct patient, procedure, equipment, support staff and site/side marked as required  Indications: med administration and vascular access  Anesthesia: local infiltration  Local anesthetic: lidocaine 1% without epinephrine  Anesthetic Total (mL): 2  Preparation: skin prepped with ChloraPrep  Skin prep agent dried: skin prep agent completely dried prior to procedure  Sterile barriers: all five maximum sterile barriers used - cap, mask, sterile gown, sterile gloves, and large sterile sheet  Hand hygiene: hand hygiene performed prior to central venous catheter insertion  Location details: left brachial  Catheter type: double lumen  Catheter size: 5 Fr  Catheter Length: 39cm    Ultrasound guidance: yes  Vessel Caliber: medium and patent, compressibility normal  Vascular Doppler: not done  Needle advanced into vessel with real time Ultrasound guidance.  Guidewire confirmed in vessel.  Image recorded and saved.  Sterile sheath used.  Number of attempts: 1  Post-procedure: blood return through all ports, chlorhexidine patch and sterile dressing applied  Technical procedures used: 3cg  Specimens: No  Implants: No  Assessment: placement verified by x-ray  Complications: none          Brenda Knowles  2/4/2019  "

## 2019-02-04 NOTE — SUBJECTIVE & OBJECTIVE
Interval History:   Good UOP. NAEON. AFVSS.       Medications:  Continuous Infusions:   TPN ADULT CENTRAL LINE CUSTOM 60 mL/hr at 02/04/19 0600     Scheduled Meds:   albuterol-ipratropium  3 mL Nebulization Q6H    ciprofloxacin  400 mg Intravenous Q12H    fat emulsion 20%  250 mL Intravenous Daily    fluticasone-vilanterol  1 puff Inhalation Daily    furosemide  40 mg Intravenous BID    metronidazole  500 mg Intravenous Q8H    pantoprozole (PROTONIX) IV  40 mg Intravenous Q12H    vancomycin (VANCOCIN) IVPB  750 mg Intravenous Q24H     PRN Meds:sodium chloride, sodium chloride, albuterol sulfate, dextrose 50%, diclofenac sodium, diphenhydrAMINE, glucagon (human recombinant), HYDROmorphone, insulin aspart U-100, ketorolac, ondansetron, promethazine (PHENERGAN) IVPB, sodium chloride 0.9%, sodium chloride 0.9%, white petrolatum     Review of patient's allergies indicates:   Allergen Reactions    Ace inhibitors Swelling    Carvedilol      Other reaction(s): Other (See Comments)  blister    Hydralazine analogues     Metformin Nausea And Vomiting    Tetracycline Itching    Tetracyclines Swelling    Travatan (with benzalkonium) [travoprost (benzalkonium)]     Travoprost Itching     Other reaction(s): Other (See Comments)  Blurry vision     Objective:     Vital Signs (Most Recent):  Temp: 98.2 °F (36.8 °C) (02/04/19 0300)  Pulse: 100 (02/04/19 0330)  Resp: 14 (02/04/19 0330)  BP: (!) 141/77 (02/04/19 0300)  SpO2: 97 % (02/04/19 0330) Vital Signs (24h Range):  Temp:  [98 °F (36.7 °C)-98.5 °F (36.9 °C)] 98.2 °F (36.8 °C)  Pulse:  [] 100  Resp:  [5-27] 14  SpO2:  [92 %-100 %] 97 %  BP: (100-159)/(54-78) 141/77     Weight: 71.2 kg (157 lb)  Body mass index is 29.66 kg/m².    Intake/Output - Last 3 Shifts       02/02 0700 - 02/03 0659 02/03 0700 - 02/04 0659 02/04 0700 - 02/05 0659    I.V. (mL/kg) 139.5 (2) 1171 (16.4)     IV Piggyback 2100 100     TPN 1566.6 1844     Total Intake(mL/kg) 3806.1 (53.5) 3115  (43.8)     Urine (mL/kg/hr) 5175 (3) 3995 (2.3)     Drains 560 144     Total Output 5787 0279     Net -1074.9 -3448                  Physical Exam   Constitutional: She appears well-developed. No distress.   HENT:   Head: Normocephalic and atraumatic.   Pulmonary/Chest: Effort normal. No stridor. No respiratory distress.   Coarse breath sounds bilaterally   Abdominal: Soft. Bowel sounds are decreased. There is tenderness. There is no guarding.       Open wounds covered with adequate dressing. No signs of infection. LUCIANO drain with serous output.   Musculoskeletal: She exhibits no edema.   Neurological: She is alert.   Skin: Skin is warm and dry.   Psychiatric: She has a normal mood and affect.       Significant Labs:  CBC:   Recent Labs   Lab 02/03/19 2003   WBC 10.27   RBC 2.92*   HGB 8.1*   HCT 24.8*   *   MCV 85   MCH 27.7   MCHC 32.7     CMP:   Recent Labs   Lab 02/03/19 2003   GLU 91   CALCIUM 8.0*   ALBUMIN 1.5*   PROT 4.4*      K 4.8   CO2 35*   CL 98   BUN 29*   CREATININE 0.5   ALKPHOS 1,098*   *   *   BILITOT 4.0*       Significant Diagnostics:  None

## 2019-02-04 NOTE — SUBJECTIVE & OBJECTIVE
"Interval HPI:   Overnight events: Remains in ICU, contact isolation. NAEON. BG reasonably well controlled with correction scale coverage.   Eating:   NPO  Nausea: No  Hypoglycemia and intervention: No  Fever: No  TPN  At 60 cc/hr     BP (!) 112/59   Pulse 95   Temp 98.1 °F (36.7 °C) (Oral)   Resp 13   Ht 5' 1" (1.549 m)   Wt 71.2 kg (157 lb)   LMP  (LMP Unknown)   SpO2 100%   Breastfeeding? No   BMI 29.66 kg/m²     Labs Reviewed and Include    Recent Labs   Lab 02/04/19  0759   *   CALCIUM 8.2*   ALBUMIN 1.5*   PROT 4.2*      K 4.3   CO2 36*   CL 98   BUN 29*   CREATININE 0.5   ALKPHOS 1,111*   *   *   BILITOT 4.0*     Lab Results   Component Value Date    WBC 10.59 02/04/2019    HGB 8.4 (L) 02/04/2019    HCT 25.0 (L) 02/04/2019    MCV 84 02/04/2019     (L) 02/04/2019     No results for input(s): TSH, FREET4 in the last 168 hours.  Lab Results   Component Value Date    HGBA1C 4.7 01/24/2019       Nutritional status:   Body mass index is 29.66 kg/m².  Lab Results   Component Value Date    ALBUMIN 1.5 (L) 02/04/2019    ALBUMIN 1.5 (L) 02/03/2019    ALBUMIN 1.6 (L) 02/03/2019     Lab Results   Component Value Date    PREALBUMIN 10 (L) 02/04/2019    PREALBUMIN 6 (L) 01/31/2019    PREALBUMIN 3 (L) 01/28/2019       Estimated Creatinine Clearance: 104 mL/min (based on SCr of 0.5 mg/dL).    Accu-Checks  Recent Labs     02/02/19  1930 02/03/19  0025 02/03/19  0432 02/03/19  0805 02/03/19  1211 02/03/19  1606 02/03/19  2002 02/03/19  2358 02/04/19  0329 02/04/19  0808   POCTGLUCOSE 196* 167* 110 120* 141* 169* 102 139* 171* 173*       Current Medications and/or Treatments Impacting Glycemic Control  Immunotherapy:    Immunosuppressants     None        Steroids:   Hormones (From admission, onward)    None        Pressors:    Autonomic Drugs (From admission, onward)    None        Hyperglycemia/Diabetes Medications:   Antihyperglycemics (From admission, onward)    Start     Stop Route " Frequency Ordered    02/03/19 1016  insulin aspart U-100 pen 1-10 Units      -- SubQ Every 4 hours PRN 02/03/19 0916

## 2019-02-04 NOTE — PLAN OF CARE
Problem: Adult Inpatient Plan of Care  Goal: Plan of Care Review  PMH: HTN, COPD, HF, DMII, CVA (2012; R-side weakness residual); hip fx; FTT; PE    DX: s/p ex-lap I&D R hemipelvis fluid collection 1/25 post-op hypotension/acidosis requiring fluid rescus.     1/26: SCUF overnight, electrolyte repletion, trending lactate. (9.6->6.9->6.2->6.0); restart TPN;   1/28: extubated; insulin gtt off; Vaso off; D10% gtt started; HIT panel sent  1/31: insulin gtt maintained          Nursing:   MAP >65  Neuro checks Q4H  Accucheck Q4H  PM Bath         Outcome: Ongoing (interventions implemented as appropriate)  Patient AAOx4.  VSS.  Has remained afebrile this shift.  Uneventful shift.  No new changes.  TPN @ 60ml/h.  Lipids @ 20.8ml/h.  Ritchie cath intact draining bert urine.  LUCIANO drain intact draining serous fluid. POC reviewed with patient.  Open discussion was facilitated.  Patient verbalized understanding.  Currently sitting up in cardiac chair, seatbelt in use, call light within reach, and safety measures maintained throughout shift.  Patient denies any needs at this time.  Will continue to monitor.

## 2019-02-04 NOTE — ASSESSMENT & PLAN NOTE
64 yo female presenting with abdominal pain, nausea, elevated lactate s/p open drainage pelvic abscess 1/25     -Continue broad spec abx, f/u cultures- abdominal with bacteroides, cont cipro/flagyl plan for 4 week course  blood cx from admit MRSA x2 plan for 2 week course vanc, repeat BCx NGTD  -NPO for now until seen by speech  -WOCN following, appreciate help with wounds/blisters, blisters appear to be 2/2 severe anasarca  -US with few small gallstones and no evidence acute cholecystitis - LFT elevation likely 2/2 TPN   -Continue ICU care, stepdown soon, discuss timing with staff

## 2019-02-04 NOTE — ASSESSMENT & PLAN NOTE
Contributing Nutrition Diagnosis  Malnutrition in the context of Acute Illness/Injury    Related to (etiology):  Decreased PO intake 2/2 abdominal pain and N/V    Signs and Symptoms (as evidenced by):  Energy Intake: <75% of estimated energy requirement for several months per patient  Body Fat Depletion: mild depletion of orbitals and triceps   Muscle Mass Depletion: mild and moderate depletion of temples, clavicle region and lower extremities   Weight Loss: 12.7% x 5 months   Fluid Accumulation: mild and moderate    Interventions/Recommendations (treatment strategy):  Collaboration and referral of nutrition care     Nutrition Diagnosis Status:  Continues

## 2019-02-05 LAB
ABO + RH BLD: NORMAL
ALBUMIN SERPL BCP-MCNC: 1.4 G/DL
ALBUMIN SERPL BCP-MCNC: 1.4 G/DL
ALP SERPL-CCNC: 870 U/L
ALP SERPL-CCNC: 939 U/L
ALT SERPL W/O P-5'-P-CCNC: 77 U/L
ALT SERPL W/O P-5'-P-CCNC: 93 U/L
ANION GAP SERPL CALC-SCNC: 5 MMOL/L
ANION GAP SERPL CALC-SCNC: 7 MMOL/L
AST SERPL-CCNC: 43 U/L
AST SERPL-CCNC: 57 U/L
BASOPHILS # BLD AUTO: 0.02 K/UL
BASOPHILS # BLD AUTO: 0.02 K/UL
BASOPHILS NFR BLD: 0.1 %
BASOPHILS NFR BLD: 0.1 %
BILIRUB SERPL-MCNC: 4.1 MG/DL
BILIRUB SERPL-MCNC: 4.2 MG/DL
BLD GP AB SCN CELLS X3 SERPL QL: NORMAL
BUN SERPL-MCNC: 26 MG/DL
BUN SERPL-MCNC: 27 MG/DL
CA-I BLDV-SCNC: 1.07 MMOL/L
CA-I BLDV-SCNC: 1.11 MMOL/L
CA-I BLDV-SCNC: 1.13 MMOL/L
CA-I BLDV-SCNC: 1.14 MMOL/L
CALCIUM SERPL-MCNC: 8.3 MG/DL
CALCIUM SERPL-MCNC: 8.3 MG/DL
CHLORIDE SERPL-SCNC: 100 MMOL/L
CHLORIDE SERPL-SCNC: 103 MMOL/L
CO2 SERPL-SCNC: 28 MMOL/L
CO2 SERPL-SCNC: 29 MMOL/L
CREAT SERPL-MCNC: 0.6 MG/DL
CREAT SERPL-MCNC: 0.6 MG/DL
DIFFERENTIAL METHOD: ABNORMAL
DIFFERENTIAL METHOD: ABNORMAL
EOSINOPHIL # BLD AUTO: 0.7 K/UL
EOSINOPHIL # BLD AUTO: 0.7 K/UL
EOSINOPHIL NFR BLD: 4.8 %
EOSINOPHIL NFR BLD: 5.1 %
ERYTHROCYTE [DISTWIDTH] IN BLOOD BY AUTOMATED COUNT: 19.1 %
ERYTHROCYTE [DISTWIDTH] IN BLOOD BY AUTOMATED COUNT: 19.3 %
EST. GFR  (AFRICAN AMERICAN): >60 ML/MIN/1.73 M^2
EST. GFR  (AFRICAN AMERICAN): >60 ML/MIN/1.73 M^2
EST. GFR  (NON AFRICAN AMERICAN): >60 ML/MIN/1.73 M^2
EST. GFR  (NON AFRICAN AMERICAN): >60 ML/MIN/1.73 M^2
GLUCOSE SERPL-MCNC: 129 MG/DL
GLUCOSE SERPL-MCNC: 168 MG/DL
HCT VFR BLD AUTO: 24.4 %
HCT VFR BLD AUTO: 24.9 %
HGB BLD-MCNC: 7.5 G/DL
HGB BLD-MCNC: 7.8 G/DL
IMM GRANULOCYTES # BLD AUTO: 0.1 K/UL
IMM GRANULOCYTES # BLD AUTO: 0.13 K/UL
IMM GRANULOCYTES NFR BLD AUTO: 0.7 %
IMM GRANULOCYTES NFR BLD AUTO: 1 %
LYMPHOCYTES # BLD AUTO: 1.5 K/UL
LYMPHOCYTES # BLD AUTO: 1.7 K/UL
LYMPHOCYTES NFR BLD: 11.2 %
LYMPHOCYTES NFR BLD: 12 %
MAGNESIUM SERPL-MCNC: 1.7 MG/DL
MAGNESIUM SERPL-MCNC: 1.7 MG/DL
MAGNESIUM SERPL-MCNC: 1.9 MG/DL
MAGNESIUM SERPL-MCNC: 2 MG/DL
MCH RBC QN AUTO: 27.2 PG
MCH RBC QN AUTO: 27.4 PG
MCHC RBC AUTO-ENTMCNC: 30.7 G/DL
MCHC RBC AUTO-ENTMCNC: 31.3 G/DL
MCV RBC AUTO: 87 FL
MCV RBC AUTO: 89 FL
MONOCYTES # BLD AUTO: 0.5 K/UL
MONOCYTES # BLD AUTO: 0.8 K/UL
MONOCYTES NFR BLD: 3.8 %
MONOCYTES NFR BLD: 5.6 %
NEUTROPHILS # BLD AUTO: 10.7 K/UL
NEUTROPHILS # BLD AUTO: 10.7 K/UL
NEUTROPHILS NFR BLD: 76.5 %
NEUTROPHILS NFR BLD: 79.1 %
NRBC BLD-RTO: 0 /100 WBC
NRBC BLD-RTO: 0 /100 WBC
PHOSPHATE SERPL-MCNC: 2.9 MG/DL
PHOSPHATE SERPL-MCNC: 3 MG/DL
PHOSPHATE SERPL-MCNC: 3.3 MG/DL
PHOSPHATE SERPL-MCNC: 3.7 MG/DL
PLATELET # BLD AUTO: 118 K/UL
PLATELET # BLD AUTO: 123 K/UL
PMV BLD AUTO: 12.5 FL
PMV BLD AUTO: 12.8 FL
POCT GLUCOSE: 141 MG/DL (ref 70–110)
POCT GLUCOSE: 143 MG/DL (ref 70–110)
POCT GLUCOSE: 146 MG/DL (ref 70–110)
POCT GLUCOSE: 162 MG/DL (ref 70–110)
POCT GLUCOSE: 173 MG/DL (ref 70–110)
POCT GLUCOSE: 186 MG/DL (ref 70–110)
POCT GLUCOSE: 208 MG/DL (ref 70–110)
POTASSIUM SERPL-SCNC: 3.5 MMOL/L
POTASSIUM SERPL-SCNC: 3.7 MMOL/L
PROT SERPL-MCNC: 4 G/DL
PROT SERPL-MCNC: 4.4 G/DL
RBC # BLD AUTO: 2.74 M/UL
RBC # BLD AUTO: 2.87 M/UL
SODIUM SERPL-SCNC: 136 MMOL/L
SODIUM SERPL-SCNC: 136 MMOL/L
VANCOMYCIN TROUGH SERPL-MCNC: 11.8 UG/ML
WBC # BLD AUTO: 13.53 K/UL
WBC # BLD AUTO: 14.03 K/UL

## 2019-02-05 PROCEDURE — 83735 ASSAY OF MAGNESIUM: CPT | Mod: 91

## 2019-02-05 PROCEDURE — A4216 STERILE WATER/SALINE, 10 ML: HCPCS | Performed by: SURGERY

## 2019-02-05 PROCEDURE — 99900035 HC TECH TIME PER 15 MIN (STAT)

## 2019-02-05 PROCEDURE — 99233 SBSQ HOSP IP/OBS HIGH 50: CPT | Mod: 24,GC,, | Performed by: SURGERY

## 2019-02-05 PROCEDURE — C9113 INJ PANTOPRAZOLE SODIUM, VIA: HCPCS | Performed by: PHYSICIAN ASSISTANT

## 2019-02-05 PROCEDURE — 94761 N-INVAS EAR/PLS OXIMETRY MLT: CPT

## 2019-02-05 PROCEDURE — 25000003 PHARM REV CODE 250: Performed by: SURGERY

## 2019-02-05 PROCEDURE — 83735 ASSAY OF MAGNESIUM: CPT

## 2019-02-05 PROCEDURE — 94640 AIRWAY INHALATION TREATMENT: CPT

## 2019-02-05 PROCEDURE — 25000003 PHARM REV CODE 250: Performed by: STUDENT IN AN ORGANIZED HEALTH CARE EDUCATION/TRAINING PROGRAM

## 2019-02-05 PROCEDURE — 63600175 PHARM REV CODE 636 W HCPCS: Performed by: STUDENT IN AN ORGANIZED HEALTH CARE EDUCATION/TRAINING PROGRAM

## 2019-02-05 PROCEDURE — S0030 INJECTION, METRONIDAZOLE: HCPCS | Performed by: SURGERY

## 2019-02-05 PROCEDURE — 86850 RBC ANTIBODY SCREEN: CPT

## 2019-02-05 PROCEDURE — 82330 ASSAY OF CALCIUM: CPT

## 2019-02-05 PROCEDURE — 82330 ASSAY OF CALCIUM: CPT | Mod: 91

## 2019-02-05 PROCEDURE — 27000646 HC AEROBIKA DEVICE

## 2019-02-05 PROCEDURE — 63600175 PHARM REV CODE 636 W HCPCS: Performed by: SURGERY

## 2019-02-05 PROCEDURE — 20000000 HC ICU ROOM

## 2019-02-05 PROCEDURE — B4185 PARENTERAL SOL 10 GM LIPIDS: HCPCS | Performed by: STUDENT IN AN ORGANIZED HEALTH CARE EDUCATION/TRAINING PROGRAM

## 2019-02-05 PROCEDURE — 84100 ASSAY OF PHOSPHORUS: CPT | Mod: 91

## 2019-02-05 PROCEDURE — 80053 COMPREHEN METABOLIC PANEL: CPT

## 2019-02-05 PROCEDURE — 63600175 PHARM REV CODE 636 W HCPCS: Performed by: PHYSICIAN ASSISTANT

## 2019-02-05 PROCEDURE — 85025 COMPLETE CBC W/AUTO DIFF WBC: CPT

## 2019-02-05 PROCEDURE — 94664 DEMO&/EVAL PT USE INHALER: CPT

## 2019-02-05 PROCEDURE — 99233 PR SUBSEQUENT HOSPITAL CARE,LEVL III: ICD-10-PCS | Mod: 24,GC,, | Performed by: SURGERY

## 2019-02-05 PROCEDURE — 84100 ASSAY OF PHOSPHORUS: CPT

## 2019-02-05 PROCEDURE — 25000242 PHARM REV CODE 250 ALT 637 W/ HCPCS: Performed by: SURGERY

## 2019-02-05 PROCEDURE — A4217 STERILE WATER/SALINE, 500 ML: HCPCS | Performed by: STUDENT IN AN ORGANIZED HEALTH CARE EDUCATION/TRAINING PROGRAM

## 2019-02-05 PROCEDURE — 80202 ASSAY OF VANCOMYCIN: CPT

## 2019-02-05 RX ORDER — POTASSIUM CHLORIDE 29.8 MG/ML
40 INJECTION INTRAVENOUS ONCE
Status: COMPLETED | OUTPATIENT
Start: 2019-02-05 | End: 2019-02-05

## 2019-02-05 RX ORDER — FUROSEMIDE 40 MG/1
40 TABLET ORAL 2 TIMES DAILY
Status: DISCONTINUED | OUTPATIENT
Start: 2019-02-05 | End: 2019-02-05

## 2019-02-05 RX ORDER — MAGNESIUM SULFATE HEPTAHYDRATE 40 MG/ML
2 INJECTION, SOLUTION INTRAVENOUS ONCE
Status: COMPLETED | OUTPATIENT
Start: 2019-02-05 | End: 2019-02-05

## 2019-02-05 RX ORDER — HYDRALAZINE HYDROCHLORIDE 20 MG/ML
10 INJECTION INTRAMUSCULAR; INTRAVENOUS EVERY 6 HOURS PRN
Status: DISCONTINUED | OUTPATIENT
Start: 2019-02-05 | End: 2019-02-22

## 2019-02-05 RX ORDER — FUROSEMIDE 10 MG/ML
40 INJECTION INTRAMUSCULAR; INTRAVENOUS 2 TIMES DAILY
Status: DISCONTINUED | OUTPATIENT
Start: 2019-02-05 | End: 2019-02-12

## 2019-02-05 RX ORDER — VANCOMYCIN HCL IN 5 % DEXTROSE 1G/250ML
1000 PLASTIC BAG, INJECTION (ML) INTRAVENOUS
Status: COMPLETED | OUTPATIENT
Start: 2019-02-05 | End: 2019-02-08

## 2019-02-05 RX ORDER — IPRATROPIUM BROMIDE AND ALBUTEROL SULFATE 2.5; .5 MG/3ML; MG/3ML
3 SOLUTION RESPIRATORY (INHALATION) EVERY 6 HOURS PRN
Status: DISCONTINUED | OUTPATIENT
Start: 2019-02-05 | End: 2019-02-19

## 2019-02-05 RX ADMIN — IPRATROPIUM BROMIDE AND ALBUTEROL SULFATE 3 ML: .5; 3 SOLUTION RESPIRATORY (INHALATION) at 12:02

## 2019-02-05 RX ADMIN — HYDROMORPHONE HYDROCHLORIDE 0.5 MG: 1 INJECTION, SOLUTION INTRAMUSCULAR; INTRAVENOUS; SUBCUTANEOUS at 01:02

## 2019-02-05 RX ADMIN — METRONIDAZOLE 500 MG: 500 INJECTION, SOLUTION INTRAVENOUS at 06:02

## 2019-02-05 RX ADMIN — HYDRALAZINE HYDROCHLORIDE 10 MG: 20 INJECTION INTRAMUSCULAR; INTRAVENOUS at 03:02

## 2019-02-05 RX ADMIN — KETOROLAC TROMETHAMINE 15 MG: 15 INJECTION, SOLUTION INTRAMUSCULAR; INTRAVENOUS at 09:02

## 2019-02-05 RX ADMIN — Medication 10 ML: at 11:02

## 2019-02-05 RX ADMIN — HYDROMORPHONE HYDROCHLORIDE 0.5 MG: 1 INJECTION, SOLUTION INTRAMUSCULAR; INTRAVENOUS; SUBCUTANEOUS at 02:02

## 2019-02-05 RX ADMIN — HYDROMORPHONE HYDROCHLORIDE 0.5 MG: 1 INJECTION, SOLUTION INTRAMUSCULAR; INTRAVENOUS; SUBCUTANEOUS at 11:02

## 2019-02-05 RX ADMIN — SOYBEAN OIL 250 ML: 20 INJECTION, SOLUTION INTRAVENOUS at 09:02

## 2019-02-05 RX ADMIN — CIPROFLOXACIN 400 MG: 2 INJECTION, SOLUTION INTRAVENOUS at 09:02

## 2019-02-05 RX ADMIN — PANTOPRAZOLE SODIUM 40 MG: 40 INJECTION, POWDER, LYOPHILIZED, FOR SOLUTION INTRAVENOUS at 08:02

## 2019-02-05 RX ADMIN — ENOXAPARIN SODIUM 40 MG: 100 INJECTION SUBCUTANEOUS at 05:02

## 2019-02-05 RX ADMIN — HYDROMORPHONE HYDROCHLORIDE 0.5 MG: 1 INJECTION, SOLUTION INTRAMUSCULAR; INTRAVENOUS; SUBCUTANEOUS at 07:02

## 2019-02-05 RX ADMIN — HYDROMORPHONE HYDROCHLORIDE 0.5 MG: 1 INJECTION, SOLUTION INTRAMUSCULAR; INTRAVENOUS; SUBCUTANEOUS at 08:02

## 2019-02-05 RX ADMIN — FUROSEMIDE 40 MG: 10 INJECTION, SOLUTION INTRAVENOUS at 06:02

## 2019-02-05 RX ADMIN — POTASSIUM CHLORIDE: 2 INJECTION, SOLUTION, CONCENTRATE INTRAVENOUS at 09:02

## 2019-02-05 RX ADMIN — INSULIN ASPART 2 UNITS: 100 INJECTION, SOLUTION INTRAVENOUS; SUBCUTANEOUS at 12:02

## 2019-02-05 RX ADMIN — HYDROMORPHONE HYDROCHLORIDE 0.5 MG: 1 INJECTION, SOLUTION INTRAMUSCULAR; INTRAVENOUS; SUBCUTANEOUS at 04:02

## 2019-02-05 RX ADMIN — MICONAZOLE NITRATE: 20 OINTMENT TOPICAL at 08:02

## 2019-02-05 RX ADMIN — FLUTICASONE FUROATE AND VILANTEROL TRIFENATATE 1 PUFF: 100; 25 POWDER RESPIRATORY (INHALATION) at 08:02

## 2019-02-05 RX ADMIN — VANCOMYCIN HYDROCHLORIDE 1000 MG: 1 INJECTION, POWDER, FOR SOLUTION INTRAVENOUS at 11:02

## 2019-02-05 RX ADMIN — HYDROMORPHONE HYDROCHLORIDE 0.5 MG: 1 INJECTION, SOLUTION INTRAMUSCULAR; INTRAVENOUS; SUBCUTANEOUS at 09:02

## 2019-02-05 RX ADMIN — KETOROLAC TROMETHAMINE 15 MG: 15 INJECTION, SOLUTION INTRAMUSCULAR; INTRAVENOUS at 06:02

## 2019-02-05 RX ADMIN — HYDROMORPHONE HYDROCHLORIDE 0.5 MG: 1 INJECTION, SOLUTION INTRAMUSCULAR; INTRAVENOUS; SUBCUTANEOUS at 12:02

## 2019-02-05 RX ADMIN — METRONIDAZOLE 500 MG: 500 INJECTION, SOLUTION INTRAVENOUS at 02:02

## 2019-02-05 RX ADMIN — INSULIN ASPART 4 UNITS: 100 INJECTION, SOLUTION INTRAVENOUS; SUBCUTANEOUS at 08:02

## 2019-02-05 RX ADMIN — HYDROMORPHONE HYDROCHLORIDE 0.5 MG: 1 INJECTION, SOLUTION INTRAMUSCULAR; INTRAVENOUS; SUBCUTANEOUS at 06:02

## 2019-02-05 RX ADMIN — MAGNESIUM SULFATE IN WATER 2 G: 40 INJECTION, SOLUTION INTRAVENOUS at 09:02

## 2019-02-05 RX ADMIN — METRONIDAZOLE 500 MG: 500 INJECTION, SOLUTION INTRAVENOUS at 10:02

## 2019-02-05 RX ADMIN — IPRATROPIUM BROMIDE AND ALBUTEROL SULFATE 3 ML: .5; 3 SOLUTION RESPIRATORY (INHALATION) at 08:02

## 2019-02-05 RX ADMIN — KETOROLAC TROMETHAMINE 15 MG: 15 INJECTION, SOLUTION INTRAMUSCULAR; INTRAVENOUS at 03:02

## 2019-02-05 RX ADMIN — INSULIN ASPART 2 UNITS: 100 INJECTION, SOLUTION INTRAVENOUS; SUBCUTANEOUS at 04:02

## 2019-02-05 RX ADMIN — Medication 10 ML: at 05:02

## 2019-02-05 RX ADMIN — POTASSIUM CHLORIDE 40 MEQ: 29.8 INJECTION, SOLUTION INTRAVENOUS at 09:02

## 2019-02-05 NOTE — ASSESSMENT & PLAN NOTE
Pelvic abscess in female     63 y.o. female with hx of asthma, COPD, CAD, DM, HTN, CVA 2012 with R side deficits, HFpEF, severe debility, lap converted to open appendectomy 8/2018 complicated by cdiff, failure to thrive.  Hx of difficult intubation and delayed emergence, CO2 narcosis needing ICU stay.  Hx of PRN O2 at night. Patient now s/p exploratory laparotomy, washout, and pelvic abscess drainage on 1/25/19.     Post-operatively, on initial evaluation in PACU, patient hypotensive to 60s/40s, on BiPap. Currently fluid resuscitating with 3rd liter of LR, responding well to phenylephrine, starting phenylephrine infusion (tachycardic to ~118). ABG 7.11/49/116/16, BE -14, on bipap 14/5, 35% FiO2. Intubated on 1/26 for airway protection. Extubated 1/28. Required CRRT on 1/26 for metabolic acidosis, now off.      Neuro:  Sedation: None  Pain: multimodal     CV:  - HDS  - Off pressors  - Lactate 3.4 --> 2.3 --> 2.6->2.2->1.8->2.1 (no longer trending)  - 1 unit pRBC 2/1  - 1 unit platelets 1/31  - H/H 9.1/29     Pulm:   - Extubated 1/28/19  - on RA  - prn CXR  - prn ABG     Renal:  Trend BUN/Cr: 30/0.6  Monitor Urine output: 3770/3377, net +400  Nephro following, appreciate assistance  Receiving lasix 40 mg bid for diuresis     ID:  AF  1/24 blood cultures: gram positive cocci in clusters resembling staph  1/25 blood cultures: NGTD  1/25 operative cultures pending, ngtd thus far  - on cipro/metro/vanc  -wound vac removed by wound care 1/31; tolerated well        FEN/GI:  TPN @ 75  Replace lytes PRN  Last lactate 2.1; no longer trending  Lasix 40 mg bid  Trend Alk phos, LFTs  -Abdominal U/S 2/1: few gallstones, no acute cholecystitis      Endo:  Patient with IDDM2, on detemir 20u BID at home  1/27-1/28 with hyperglycemia into the 500s on insulin gtt @ 100u/hr  1/28-1/29 with hypoglycemia requiring d10 infusion @ 125cc/hr  With introduction of TPN has now come off of D10 with glucose levels stable; continues on insulin  gtt     Heme:  Post-op hgb 8.5   Plt 100  HIT panel negative  No signs of bleeding at this time     Dispo: Will discuss possibility of stepdown with primary team.  Primary: SICU/general surgery

## 2019-02-05 NOTE — SUBJECTIVE & OBJECTIVE
Interval History/Significant Events: No acute events overnight. Pain in RUE continues but controlled with prn pain medication. U/S of RUE negative for DVT. Patient states that she has pain and weakness in right side 2/2 past stroke.       Follow-up For: Procedure(s) (LRB):  LAPAROTOMY, EXPLORATORY (N/A)  INCISION AND DRAINAGE, ABSCESS-  drainage of pelvic abscess (N/A)  EXCISION, ABSCESS- drainage abdominal wall abscess (N/A)  APPLICATION, WOUND VAC (N/A)    Post-Operative Day: 9 Days Post-Op    Objective:     Vital Signs (Most Recent):  Temp: 98.5 °F (36.9 °C) (02/05/19 0300)  Pulse: 93 (02/05/19 0500)  Resp: 17 (02/05/19 0500)  BP: (!) 173/72 (02/05/19 0500)  SpO2: 96 % (02/05/19 0500) Vital Signs (24h Range):  Temp:  [97.8 °F (36.6 °C)-99.3 °F (37.4 °C)] 98.5 °F (36.9 °C)  Pulse:  [] 93  Resp:  [11-26] 17  SpO2:  [93 %-100 %] 96 %  BP: (104-177)/() 173/72     Weight: 71.2 kg (157 lb)  Body mass index is 29.66 kg/m².      Intake/Output Summary (Last 24 hours) at 2/5/2019 0543  Last data filed at 2/5/2019 0500  Gross per 24 hour   Intake 4882.6 ml   Output 3342 ml   Net 1540.6 ml       Physical Exam     Constitutional: She appears well-developed and well-nourished. No distress.   HENT:   Head: Normocephalic.   Eyes: Pupils are equal, round, and reactive to light.   Cardiovascular: Regular rate and rhythm.   Pulmonary/Chest: No respiratory distress. She has no wheezes.   Extubated on RA.   Abdominal: Soft. She exhibits no distension.   Neurological:   lethargic   Skin: Skin is warm and dry.       Vents: Not on Ventilator.      Lines/Drains/Airways     Peripherally Inserted Central Catheter Line                 PICC Double Lumen 02/04/19 1757 left brachial less than 1 day          Central Venous Catheter Line                 Port A Cath Single Lumen 01/23/19 1400 right subclavian 12 days          Drain                 Closed/Suction Drain 01/25/19 1020 Right;Ventral Abdomen Bulb 19 Fr. 10 days          Urethral Catheter 01/25/19 0934 Straight-tip;Non-latex 16 Fr. 10 days                Significant Labs:    CBC/Anemia Profile:  Recent Labs   Lab 02/03/19 2003 02/04/19 0759 02/04/19 1951   WBC 10.27 10.59 11.14   HGB 8.1* 8.4* 9.1*   HCT 24.8* 25.0* 29.0*   * 102* 100*   MCV 85 84 86   RDW 19.1* 19.7* 19.4*        Chemistries:  Recent Labs   Lab 02/03/19 2003 02/04/19 0759 02/04/19  1614 02/04/19 1951 02/05/19  0000     --  139  --  138  --    K 4.8  --  4.3  --  4.3  --    CL 98  --  98  --  99  --    CO2 35*  --  36*  --  35*  --    BUN 29*  --  29*  --  30*  --    CREATININE 0.5  --  0.5  --  0.6  --    CALCIUM 8.0*  --  8.2*  --  8.5*  --    ALBUMIN 1.5*  --  1.5*  --  1.6*  --    PROT 4.4*  --  4.2*  --  4.2*  --    BILITOT 4.0*  --  4.0*  --  4.3*  --    ALKPHOS 1,098*  --  1,111*  --  1,057*  --    *  --  139*  --  121*  --    *  --  125*  --  83*  --    MG  --    < > 2.0 1.9  --  2.0   PHOS  --    < > 3.3 3.8  --  3.7    < > = values in this interval not displayed.         Significant Imaging:  I have reviewed all pertinent imaging results/findings within the past 24 hours.

## 2019-02-05 NOTE — PLAN OF CARE
02/05/19 0945   Discharge Reassessment   Assessment Type Discharge Planning Reassessment   Provided patient/caregiver education on the expected discharge date and the discharge plan No  (D/c date per MD. Per KOTA Hernandez, patient is not medically stable for discharge. PT recs: SNF. Currently medical needs: NPO, TPN for dysphagia, IV Abxs x3. D/c needs TBD closer to discharge. )   Discharge Plan A Long-term acute care facility (LTAC)   Discharge Plan B Skilled Nursing Facility   Anticipated Discharge Disposition (TBD-LTAC vs SNF)

## 2019-02-05 NOTE — CARE UPDATE
BG goal 140-180    Patient has a history of DM  Remains NPO on TPN  Minimal correction scale insulin requirements  Continue moderate dose correction scale with BG monitoring q 4 hours    Endocrine to continue to follow    ** Please call Endocrine for any BG related issues **

## 2019-02-05 NOTE — PLAN OF CARE
Problem: Adult Inpatient Plan of Care  Goal: Plan of Care Review  PMH: HTN, COPD, HF, DMII, CVA (2012; R-side weakness residual); hip fx; FTT; PE    DX: s/p ex-lap I&D R hemipelvis fluid collection 1/25 post-op hypotension/acidosis requiring fluid rescus.     1/26: SCUF overnight, electrolyte repletion, trending lactate. (9.6->6.9->6.2->6.0); restart TPN;   1/28: extubated; insulin gtt off; Vaso off; D10% gtt started; HIT panel sent  1/31: insulin gtt maintained  2/4: up in cardiac chair          Nursing:   MAP >65  Neuro checks Q4H  Accucheck Q4H  PM Bath          Outcome: Ongoing (interventions implemented as appropriate)  Pt VSS throughout shift, pt remains afebrile. Pt remains on TPN. PICC line placed this evening, central line trialysis to be DC'd. Pt OOB to cardiac chair this AM and tolerated well. Pt had RUE U/S this afternoon for c/o RUE pain and aching; U/S negative for clots. Pt had speech therapy and physical therapy this afternoon and tolerated well. Abdominal dressing changed this evening by wound care. Plan of care and pt condition discussed with pt throughout shift and daughter updated on pt condition and plan of care.

## 2019-02-05 NOTE — PLAN OF CARE
Problem: Adult Inpatient Plan of Care  Goal: Plan of Care Review  PMH: HTN, COPD, HF, DMII, CVA (2012; R-side weakness residual); hip fx; FTT; PE    DX: s/p ex-lap I&D R hemipelvis fluid collection 1/25 post-op hypotension/acidosis requiring fluid rescus.     1/26: SCUF overnight, electrolyte repletion, trending lactate. (9.6->6.9->6.2->6.0); restart TPN;   1/28: extubated; insulin gtt off; Vaso off; D10% gtt started; HIT panel sent  1/31: insulin gtt maintained  2/4: up in cardiac chair          Nursing:   MAP >65  Neuro checks Q4H  Accucheck Q4H  PM Bath          Outcome: Outcome(s) achieved Date Met: 02/05/19  POC reviewed with pt at 0400. Pt verbalized understanding. Questions and concerns addressed. No acute events overnight. Abdominal dressings changed. TPN continued. Pt progressing toward goals. Will continue to monitor. See flowsheets for full assessment and VS info

## 2019-02-05 NOTE — PROGRESS NOTES
"[facility]  Endocrinology  Progress Note    Admit Date: 1/23/2019     Reason for Consult: Management of T2DM, Hyperglycemia     Surgical Procedure and Date:      LAPAROTOMY, EXPLORATORY (N/A)     INCISION AND DRAINAGE, ABSCESS-  drainage of pelvic abscess (N/A)     EXCISION, ABSCESS- drainage abdominal wall abscess (N/A)     APPLICATION, WOUND VAC (N/A)       Diabetes diagnosis year: 20 years ago    Home Diabetes Medications:  Reports being taken off of insulin prior to admission.      Levemir 20 units BID     Humalog 16 units TIDWM with correction     How often checking glucose at home? >4 x day   BG readings on regimen: 110's  Hypoglycemia on the regimen?  Yes - 40's  Missed doses on regimen?  No    Diabetes Complications include:     Hypoglycemia     Complicating diabetes co morbidities:   HLD, CVA, and CAD    Lab Results   Component Value Date    HGBA1C 4.7 01/24/2019       HPI:   Patient is a 63 y.o. female with a diagnosis of sepsis and lactic acidosis secondary to abdominal abscess. Also has diagnosis of COPD, asthma, CAD, CVA (2012), and DM2. Patient receives primary care for DM in [place]. Her primary care giver is her daughter. Patient's DM is well controlled by primary Endocrinologist in [place]. Patient has not been on insulin regimen for the past 3 weeks leading up to hospitalization. According to daughter, patient was not eating, and having low BG reading. Therefore, insulin regimen was stopped by patient's primary Endocrinologist. Endocrinology at [facility] consulted to manage DM/hyperglycemia.             Interval HPI:   Overnight events: Remains in ICU, contact isolation. NAEON. BG reasonably well controlled with correction scale coverage.   Eating:   NPO  Nausea: No  Hypoglycemia and intervention: No  Fever: No  TPN  At 60 cc/hr     BP (!) 112/59   Pulse 95   Temp 98.1 °F (36.7 °C) (Oral)   Resp 13   Ht 5' 1" (1.549 m)   Wt 71.2 kg (157 lb)   LMP  (LMP Unknown)   SpO2 100%   " Breastfeeding? No   BMI 29.66 kg/m²      Labs Reviewed and Include    Recent Labs   Lab 02/04/19  0759   *   CALCIUM 8.2*   ALBUMIN 1.5*   PROT 4.2*      K 4.3   CO2 36*   CL 98   BUN 29*   CREATININE 0.5   ALKPHOS 1,111*   *   *   BILITOT 4.0*     Lab Results   Component Value Date    WBC 10.59 02/04/2019    HGB 8.4 (L) 02/04/2019    HCT 25.0 (L) 02/04/2019    MCV 84 02/04/2019     (L) 02/04/2019     No results for input(s): TSH, FREET4 in the last 168 hours.  Lab Results   Component Value Date    HGBA1C 4.7 01/24/2019       Nutritional status:   Body mass index is 29.66 kg/m².  Lab Results   Component Value Date    ALBUMIN 1.5 (L) 02/04/2019    ALBUMIN 1.5 (L) 02/03/2019    ALBUMIN 1.6 (L) 02/03/2019     Lab Results   Component Value Date    PREALBUMIN 10 (L) 02/04/2019    PREALBUMIN 6 (L) 01/31/2019    PREALBUMIN 3 (L) 01/28/2019       Estimated Creatinine Clearance: 104 mL/min (based on SCr of 0.5 mg/dL).    Accu-Checks  Recent Labs     02/02/19  1930 02/03/19  0025 02/03/19  0432 02/03/19  0805 02/03/19  1211 02/03/19  1606 02/03/19  2002 02/03/19  2358 02/04/19  0329 02/04/19  0808   POCTGLUCOSE 196* 167* 110 120* 141* 169* 102 139* 171* 173*       Current Medications and/or Treatments Impacting Glycemic Control  Immunotherapy:    Immunosuppressants     None        Steroids:   Hormones (From admission, onward)    None        Pressors:    Autonomic Drugs (From admission, onward)    None        Hyperglycemia/Diabetes Medications:   Antihyperglycemics (From admission, onward)    Start     Stop Route Frequency Ordered    02/03/19 1016  insulin aspart U-100 pen 1-10 Units      -- SubQ Every 4 hours PRN 02/03/19 0916          ASSESSMENT and PLAN    * Pelvic abscess in female    Managed per primary team  Avoid hypoglycemia  Optimize BG control to improve wound healing         Type 2 diabetes mellitus    BG goal 140 - 180       BG monitoring every 4 hours and moderate dose correction  scale.     Discharge planning: TBD         CAD (coronary artery disease)    Managed per primary team  Condition may cause insulin resistance          On total parenteral nutrition (TPN)    May elevate BG        Adverse effect of adrenal cortical steroids, sequela    On steroid therapy per primary team; may elevate BG readings         Overweight (BMI 25.0-29.9)    Body mass index is 29.66 kg/m².  may contribute to insulin resistance             Marta Sandoval NP  Endocrinology  [facility]

## 2019-02-05 NOTE — SUBJECTIVE & OBJECTIVE
Interval History/Significant Events: No acute events overnight. Pain in RUE continues but controlled with prn pain medication. U/S of RUE negative for DVT. Patient continues to ask for consideration for stepdown as she feels well and would like to be able to see her family more.       Follow-up For: Procedure(s) (LRB):  LAPAROTOMY, EXPLORATORY (N/A)  INCISION AND DRAINAGE, ABSCESS-  drainage of pelvic abscess (N/A)  EXCISION, ABSCESS- drainage abdominal wall abscess (N/A)  APPLICATION, WOUND VAC (N/A)    Post-Operative Day: 9 Days Post-Op    Objective:     Vital Signs (Most Recent):  Temp: 98.9 °F (37.2 °C) (02/06/19 0300)  Pulse: 93 (02/06/19 0815)  Resp: 14 (02/06/19 0815)  BP: (!) 160/73 (02/06/19 0800)  SpO2: 97 % (02/06/19 0815) Vital Signs (24h Range):  Temp:  [97.3 °F (36.3 °C)-98.9 °F (37.2 °C)] 98.9 °F (37.2 °C)  Pulse:  [] 93  Resp:  [11-40] 14  SpO2:  [78 %-99 %] 97 %  BP: ()/(52-93) 160/73     Weight: 71.2 kg (157 lb)  Body mass index is 29.66 kg/m².      Intake/Output Summary (Last 24 hours) at 2/6/2019 0828  Last data filed at 2/6/2019 0800  Gross per 24 hour   Intake 2679.8 ml   Output 3610 ml   Net -930.2 ml       Physical Exam     Constitutional: She appears well-developed and well-nourished. No distress.   HENT:   Head: Normocephalic.   Eyes: Pupils are equal, round, and reactive to light.   Cardiovascular: Regular rate and rhythm.   Pulmonary/Chest: No respiratory distress. She has no wheezes.   Extubated on RA.   Abdominal: Soft. She exhibits no distension.   Neurological: AAOx3  Skin: Skin is warm and dry.       Vents: Not on Ventilator.      Lines/Drains/Airways     Peripherally Inserted Central Catheter Line                 PICC Double Lumen 02/04/19 1757 left brachial 1 day          Central Venous Catheter Line                 Port A Cath Single Lumen 01/23/19 1400 right subclavian 13 days          Drain                 Closed/Suction Drain 01/25/19 1020 Right;Ventral Abdomen Bulb  19 Fr. 11 days         Urethral Catheter 01/25/19 0934 Straight-tip;Non-latex 16 Fr. 11 days                Significant Labs:    CBC/Anemia Profile:  Recent Labs   Lab 02/05/19  0808 02/05/19 1944 02/06/19  0400   WBC 13.53* 14.03* 13.35*   HGB 7.8* 7.5* 7.6*   HCT 24.9* 24.4* 23.6*   * 123* 113*   MCV 87 89 90   RDW 19.3* 19.1* 19.2*        Chemistries:  Recent Labs   Lab 02/05/19  0808 02/05/19  1614 02/05/19 1944 02/06/19  0400     --  136 134*   K 3.7  --  3.5 3.6     --  103 105   CO2 29  --  28 25   BUN 27*  --  26* 25*   CREATININE 0.6  --  0.6 0.5   CALCIUM 8.3*  --  8.3* 7.8*   ALBUMIN 1.4*  --  1.4* 1.3*   PROT 4.4*  --  4.0* 4.6*   BILITOT 4.1*  --  4.2* 4.0*   ALKPHOS 939*  --  870* 786*   ALT 93*  --  77* 65*   AST 57*  --  43* 38   MG 1.9 1.7 1.7 2.2   PHOS 3.3 2.9 3.0 2.7         Significant Imaging:  I have reviewed all pertinent imaging results/findings within the past 24 hours.Interval History/Significant Events: No acute events overnight. Pain in RUE continues but controlled with prn pain medication. U/S of RUE negative for DVT. Patient states that she has pain and weakness in right side 2/2 past stroke.       Follow-up For: Procedure(s) (LRB):  LAPAROTOMY, EXPLORATORY (N/A)  INCISION AND DRAINAGE, ABSCESS-  drainage of pelvic abscess (N/A)  EXCISION, ABSCESS- drainage abdominal wall abscess (N/A)  APPLICATION, WOUND VAC (N/A)    Post-Operative Day: 9 Days Post-Op    Objective:     Vital Signs (Most Recent):  Temp: 98.5 °F (36.9 °C) (02/05/19 0300)  Pulse: 91 (02/05/19 0600)  Resp: 18 (02/05/19 0600)  BP: (!) 168/77 (02/05/19 0600)  SpO2: 97 % (02/05/19 0600) Vital Signs (24h Range):  Temp:  [97.8 °F (36.6 °C)-99.3 °F (37.4 °C)] 98.5 °F (36.9 °C)  Pulse:  [] 91  Resp:  [11-26] 18  SpO2:  [93 %-100 %] 97 %  BP: (104-177)/() 168/77     Weight: 71.2 kg (157 lb)  Body mass index is 29.66 kg/m².      Intake/Output Summary (Last 24 hours) at 2/5/2019 0626  Last data  filed at 2/5/2019 0600  Gross per 24 hour   Intake 3770.4 ml   Output 3377 ml   Net 393.4 ml       Physical Exam     Constitutional: She appears well-developed and well-nourished. No distress.   HENT:   Head: Normocephalic.   Eyes: Pupils are equal, round, and reactive to light.   Cardiovascular: Regular rate and rhythm.   Pulmonary/Chest: No respiratory distress. She has no wheezes.   Extubated on RA.   Abdominal: Soft. She exhibits no distension.   Neurological:   lethargic   Skin: Skin is warm and dry.       Vents: Not on Ventilator.      Lines/Drains/Airways     Peripherally Inserted Central Catheter Line                 PICC Double Lumen 02/04/19 1757 left brachial less than 1 day          Central Venous Catheter Line                 Port A Cath Single Lumen 01/23/19 1400 right subclavian 12 days          Drain                 Closed/Suction Drain 01/25/19 1020 Right;Ventral Abdomen Bulb 19 Fr. 10 days         Urethral Catheter 01/25/19 0934 Straight-tip;Non-latex 16 Fr. 10 days                Significant Labs:    CBC/Anemia Profile:  Recent Labs   Lab 02/03/19 2003 02/04/19 0759 02/04/19 1951   WBC 10.27 10.59 11.14   HGB 8.1* 8.4* 9.1*   HCT 24.8* 25.0* 29.0*   * 102* 100*   MCV 85 84 86   RDW 19.1* 19.7* 19.4*        Chemistries:  Recent Labs   Lab 02/03/19 2003 02/04/19 0759 02/04/19  1614 02/04/19 1951 02/05/19  0000     --  139  --  138  --    K 4.8  --  4.3  --  4.3  --    CL 98  --  98  --  99  --    CO2 35*  --  36*  --  35*  --    BUN 29*  --  29*  --  30*  --    CREATININE 0.5  --  0.5  --  0.6  --    CALCIUM 8.0*  --  8.2*  --  8.5*  --    ALBUMIN 1.5*  --  1.5*  --  1.6*  --    PROT 4.4*  --  4.2*  --  4.2*  --    BILITOT 4.0*  --  4.0*  --  4.3*  --    ALKPHOS 1,098*  --  1,111*  --  1,057*  --    *  --  139*  --  121*  --    *  --  125*  --  83*  --    MG  --    < > 2.0 1.9  --  2.0   PHOS  --    < > 3.3 3.8  --  3.7    < > = values in this interval not  displayed.         Significant Imaging:  I have reviewed all pertinent imaging results/findings within the past 24 hours.Interval History/Significant Events: No acute events overnight. Pain in RUE continues but controlled with prn pain medication. U/S of RUE negative for DVT. Patient states that she has pain and weakness in right side 2/2 past stroke.       Follow-up For: Procedure(s) (LRB):  LAPAROTOMY, EXPLORATORY (N/A)  INCISION AND DRAINAGE, ABSCESS-  drainage of pelvic abscess (N/A)  EXCISION, ABSCESS- drainage abdominal wall abscess (N/A)  APPLICATION, WOUND VAC (N/A)    Post-Operative Day: 9 Days Post-Op    Objective:     Vital Signs (Most Recent):  Temp: 98.5 °F (36.9 °C) (02/05/19 0300)  Pulse: 91 (02/05/19 0600)  Resp: 18 (02/05/19 0600)  BP: (!) 168/77 (02/05/19 0600)  SpO2: 97 % (02/05/19 0600) Vital Signs (24h Range):  Temp:  [97.8 °F (36.6 °C)-99.3 °F (37.4 °C)] 98.5 °F (36.9 °C)  Pulse:  [] 91  Resp:  [11-26] 18  SpO2:  [93 %-100 %] 97 %  BP: (104-177)/() 168/77     Weight: 71.2 kg (157 lb)  Body mass index is 29.66 kg/m².      Intake/Output Summary (Last 24 hours) at 2/5/2019 0626  Last data filed at 2/5/2019 0600  Gross per 24 hour   Intake 3770.4 ml   Output 3377 ml   Net 393.4 ml       Physical Exam     Constitutional: She appears well-developed and well-nourished. No distress.   HENT:   Head: Normocephalic.   Eyes: Pupils are equal, round, and reactive to light.   Cardiovascular: Regular rate and rhythm.   Pulmonary/Chest: No respiratory distress. She has no wheezes.   Extubated on RA.   Abdominal: Soft. She exhibits no distension.   Neurological:   lethargic   Skin: Skin is warm and dry.       Vents: Not on Ventilator.      Lines/Drains/Airways     Peripherally Inserted Central Catheter Line                 PICC Double Lumen 02/04/19 1757 left brachial less than 1 day          Central Venous Catheter Line                 Port A Cath Single Lumen 01/23/19 1400 right subclavian 12 days           Drain                 Closed/Suction Drain 01/25/19 1020 Right;Ventral Abdomen Bulb 19 Fr. 10 days         Urethral Catheter 01/25/19 0934 Straight-tip;Non-latex 16 Fr. 10 days                Significant Labs:    CBC/Anemia Profile:  Recent Labs   Lab 02/03/19 2003 02/04/19 0759 02/04/19 1951   WBC 10.27 10.59 11.14   HGB 8.1* 8.4* 9.1*   HCT 24.8* 25.0* 29.0*   * 102* 100*   MCV 85 84 86   RDW 19.1* 19.7* 19.4*        Chemistries:  Recent Labs   Lab 02/03/19 2003 02/04/19 0759 02/04/19 1614 02/04/19 1951 02/05/19  0000     --  139  --  138  --    K 4.8  --  4.3  --  4.3  --    CL 98  --  98  --  99  --    CO2 35*  --  36*  --  35*  --    BUN 29*  --  29*  --  30*  --    CREATININE 0.5  --  0.5  --  0.6  --    CALCIUM 8.0*  --  8.2*  --  8.5*  --    ALBUMIN 1.5*  --  1.5*  --  1.6*  --    PROT 4.4*  --  4.2*  --  4.2*  --    BILITOT 4.0*  --  4.0*  --  4.3*  --    ALKPHOS 1,098*  --  1,111*  --  1,057*  --    *  --  139*  --  121*  --    *  --  125*  --  83*  --    MG  --    < > 2.0 1.9  --  2.0   PHOS  --    < > 3.3 3.8  --  3.7    < > = values in this interval not displayed.         Significant Imaging:  I have reviewed all pertinent imaging results/findings within the past 24 hours.Interval History/Significant Events: No acute events overnight. Pain controlled with prn pain medication. Continues with 40 mg lasix bid for diuresis; UOP  cc/hr overnight. Platelet numbers slowly recovering (142 this am on labs).    Follow-up For: Procedure(s) (LRB):  LAPAROTOMY, EXPLORATORY (N/A)  INCISION AND DRAINAGE, ABSCESS-  drainage of pelvic abscess (N/A)  EXCISION, ABSCESS- drainage abdominal wall abscess (N/A)  APPLICATION, WOUND VAC (N/A)    Post-Operative Day: 9 Days Post-Op    Objective:     Vital Signs (Most Recent):  Temp: 98.5 °F (36.9 °C) (02/05/19 0300)  Pulse: 91 (02/05/19 0600)  Resp: 18 (02/05/19 0600)  BP: (!) 168/77 (02/05/19 0600)  SpO2: 97 % (02/05/19 0600)  Vital Signs (24h Range):  Temp:  [97.8 °F (36.6 °C)-99.3 °F (37.4 °C)] 98.5 °F (36.9 °C)  Pulse:  [] 91  Resp:  [11-26] 18  SpO2:  [93 %-100 %] 97 %  BP: (104-177)/() 168/77     Weight: 71.2 kg (157 lb)  Body mass index is 29.66 kg/m².      Intake/Output Summary (Last 24 hours) at 2/5/2019 0626  Last data filed at 2/5/2019 0600  Gross per 24 hour   Intake 3770.4 ml   Output 3377 ml   Net 393.4 ml       Physical Exam   Constitutional: She appears well-developed and well-nourished. No distress.   HENT:   Head: Normocephalic.   Eyes: Pupils are equal, round, and reactive to light.   Cardiovascular: Regular rate and rhythm.   Pulmonary/Chest: No respiratory distress. She has no wheezes.   Extubated on RA.   Abdominal: Soft. She exhibits no distension.   Neurological:   lethargic   Skin: Skin is warm and dry.       Vents: Not on Ventilator.      Lines/Drains/Airways     Peripherally Inserted Central Catheter Line                 PICC Double Lumen 02/04/19 1757 left brachial less than 1 day          Central Venous Catheter Line                 Port A Cath Single Lumen 01/23/19 1400 right subclavian 12 days          Drain                 Closed/Suction Drain 01/25/19 1020 Right;Ventral Abdomen Bulb 19 Fr. 10 days         Urethral Catheter 01/25/19 0934 Straight-tip;Non-latex 16 Fr. 10 days                Significant Labs:    CBC/Anemia Profile:  Recent Labs   Lab 02/03/19 2003 02/04/19 0759 02/04/19 1951   WBC 10.27 10.59 11.14   HGB 8.1* 8.4* 9.1*   HCT 24.8* 25.0* 29.0*   * 102* 100*   MCV 85 84 86   RDW 19.1* 19.7* 19.4*        Chemistries:  Recent Labs   Lab 02/03/19 2003 02/04/19 0759 02/04/19  1614 02/04/19 1951 02/05/19  0000     --  139  --  138  --    K 4.8  --  4.3  --  4.3  --    CL 98  --  98  --  99  --    CO2 35*  --  36*  --  35*  --    BUN 29*  --  29*  --  30*  --    CREATININE 0.5  --  0.5  --  0.6  --    CALCIUM 8.0*  --  8.2*  --  8.5*  --    ALBUMIN 1.5*  --  1.5*  --   1.6*  --    PROT 4.4*  --  4.2*  --  4.2*  --    BILITOT 4.0*  --  4.0*  --  4.3*  --    ALKPHOS 1,098*  --  1,111*  --  1,057*  --    *  --  139*  --  121*  --    *  --  125*  --  83*  --    MG  --    < > 2.0 1.9  --  2.0   PHOS  --    < > 3.3 3.8  --  3.7    < > = values in this interval not displayed.         Significant Imaging:  I have reviewed all pertinent imaging results/findings within the past 24 hours.

## 2019-02-05 NOTE — PROGRESS NOTES
[facility]  SICU  Progress Note    Patient Name: Sheryl Martines  MRN: [id number]  Admission Date: 1/23/2019  Hospital Length of Stay: 12 days  Attending Provider: Monster Laurent MD   Primary Care Physician: Primary Doctor No  Principal Problem:Pelvic abscess in female    Subjective:     HPI: Ms. Sheryl Martines is a 62 yo AAF with HFpEF, HTN, COPD, DM, hx of CVA, and NIKHIL in the past who presented to the hospital on 1/23/19 with chief complaint of worsening nasuea/vomiting for several months.  Per history/chart review, patient underwent an appendectomy in summer of 2018, complicated by post-op nausea and vomiting with weight loss.  She had a CT scan in the ED which revealed an irregular shaped rim enhancing fluid collection measuring at least 4.0 x 3.0 cm in the right hemipelvis at the site of prior appendectomy. Surgery was consulted and she underwent Exp/Lap with abscess drainage.  She was transferred to PACU post-op for recovery and noted to be hypotensive and SOB (SBP 70's).  She was volume resuscitated with 7 L of crystalloids.  Labs revealed a lactate, peaked at 10.1 and she was started on levo/vaso for BP support, later intubated for respiratory failure.  UOP averaging around 20-30 cc/hr on morning of consultation, but slowly trended down and now anuric x 3 hours.  ABG revealing a compensated metabolic acidosis with pH of 7.26 (bicarb of 10) and Nephrology has been consulted for evaluation for RRT.        Interval History/Significant Events: No acute events overnight. Pain in RUE continues but controlled with prn pain medication. U/S of RUE negative for DVT. Patient states that she has pain and weakness in right side 2/2 past stroke.       Follow-up For: Procedure(s) (LRB):  LAPAROTOMY, EXPLORATORY (N/A)  INCISION AND DRAINAGE, ABSCESS-  drainage of pelvic abscess (N/A)  EXCISION, ABSCESS- drainage abdominal wall abscess (N/A)  APPLICATION, WOUND VAC (N/A)    Post-Operative Day: 9  Days Post-Op    Objective:     Vital Signs (Most Recent):  Temp: 98.5 °F (36.9 °C) (02/05/19 0300)  Pulse: 93 (02/05/19 0500)  Resp: 17 (02/05/19 0500)  BP: (!) 173/72 (02/05/19 0500)  SpO2: 96 % (02/05/19 0500) Vital Signs (24h Range):  Temp:  [97.8 °F (36.6 °C)-99.3 °F (37.4 °C)] 98.5 °F (36.9 °C)  Pulse:  [] 93  Resp:  [11-26] 17  SpO2:  [93 %-100 %] 96 %  BP: (104-177)/() 173/72     Weight: 71.2 kg (157 lb)  Body mass index is 29.66 kg/m².      Intake/Output Summary (Last 24 hours) at 2/5/2019 0543  Last data filed at 2/5/2019 0500  Gross per 24 hour   Intake 4882.6 ml   Output 3342 ml   Net 1540.6 ml       Physical Exam     Constitutional: She appears well-developed and well-nourished. No distress.   HENT:   Head: Normocephalic.   Eyes: Pupils are equal, round, and reactive to light.   Cardiovascular: Regular rate and rhythm.   Pulmonary/Chest: No respiratory distress. She has no wheezes.   Extubated on RA.   Abdominal: Soft. She exhibits no distension.   Neurological:   lethargic   Skin: Skin is warm and dry.       Vents: Not on Ventilator.      Lines/Drains/Airways     Peripherally Inserted Central Catheter Line                 PICC Double Lumen 02/04/19 1757 left brachial less than 1 day          Central Venous Catheter Line                 Port A Cath Single Lumen 01/23/19 1400 right subclavian 12 days          Drain                 Closed/Suction Drain 01/25/19 1020 Right;Ventral Abdomen Bulb 19 Fr. 10 days         Urethral Catheter 01/25/19 0934 Straight-tip;Non-latex 16 Fr. 10 days                Significant Labs:    CBC/Anemia Profile:  Recent Labs   Lab 02/03/19 2003 02/04/19  0759 02/04/19  1951   WBC 10.27 10.59 11.14   HGB 8.1* 8.4* 9.1*   HCT 24.8* 25.0* 29.0*   * 102* 100*   MCV 85 84 86   RDW 19.1* 19.7* 19.4*        Chemistries:  Recent Labs   Lab 02/03/19 2003 02/04/19  0759 02/04/19  1614 02/04/19 1951 02/05/19  0000     --  139  --  138  --    K 4.8  --  4.3  --   4.3  --    CL 98  --  98  --  99  --    CO2 35*  --  36*  --  35*  --    BUN 29*  --  29*  --  30*  --    CREATININE 0.5  --  0.5  --  0.6  --    CALCIUM 8.0*  --  8.2*  --  8.5*  --    ALBUMIN 1.5*  --  1.5*  --  1.6*  --    PROT 4.4*  --  4.2*  --  4.2*  --    BILITOT 4.0*  --  4.0*  --  4.3*  --    ALKPHOS 1,098*  --  1,111*  --  1,057*  --    *  --  139*  --  121*  --    *  --  125*  --  83*  --    MG  --    < > 2.0 1.9  --  2.0   PHOS  --    < > 3.3 3.8  --  3.7    < > = values in this interval not displayed.         Significant Imaging:  I have reviewed all pertinent imaging results/findings within the past 24 hours.    Assessment/Plan:     * Pelvic abscess in female    Pelvic abscess in female     63 y.o. female with hx of asthma, COPD, CAD, DM, HTN, CVA 2012 with R side deficits, HFpEF, severe debility, lap converted to open appendectomy 8/2018 complicated by cdiff, failure to thrive.  Hx of difficult intubation and delayed emergence, CO2 narcosis needing ICU stay.  Hx of PRN O2 at night. Patient now s/p exploratory laparotomy, washout, and pelvic abscess drainage on 1/25/19.     Post-operatively, on initial evaluation in PACU, patient hypotensive to 60s/40s, on BiPap. Currently fluid resuscitating with 3rd liter of LR, responding well to phenylephrine, starting phenylephrine infusion (tachycardic to ~118). ABG 7.11/49/116/16, BE -14, on bipap 14/5, 35% FiO2. Intubated on 1/26 for airway protection. Extubated 1/28. Required CRRT on 1/26 for metabolic acidosis, now off.      Neuro:  Sedation: None  Pain: multimodal     CV:  - HDS  - Off pressors  - Lactate 3.4 --> 2.3 --> 2.6->2.2->1.8->2.1 (no longer trending)  - 1 unit pRBC 2/1  - 1 unit platelets 1/31  - H/H 9.1/29  - PRN hydralazine     Pulm:   - Extubated 1/28/19  - on RA  - prn CXR  - prn ABG     Renal:  Trend BUN/Cr: 30/0.6  Monitor Urine output: 3770/3377, net +400  Nephro following, appreciate assistance  lasix 40 mg bid for  diuresis     ID:  AF  1/24 blood cultures: gram positive cocci in clusters resembling staph  1/25 blood cultures: NGTD  1/25 operative cultures pending, ngtd thus far  - on cipro/metro/vanc  -wound vac removed by wound care 1/31; tolerated well        FEN/GI:  TPN @ 75  Replace lytes PRN  Last lactate 2.1; no longer trending  Lasix 40 mg bid  Trend Alk phos, LFTs  -Abdominal U/S 2/1: few gallstones, no acute cholecystitis   - NPO until cleared by speech for PO diet     Endo:  Patient with IDDM2, on detemir 20u BID at home  1/27-1/28 with hyperglycemia into the 500s on insulin gtt @ 100u/hr  1/28-1/29 with hypoglycemia requiring d10 infusion @ 125cc/hr  With introduction of TPN has now come off of D10 with glucose levels stable     Heme:  Post-op hgb 8.5   Plt 100  HIT panel negative  No signs of bleeding at this time     Dispo: Will discuss possibility of stepdown with primary team.  Primary: SICU/general surgery                Critical care was time spent personally by me on the following activities: development of treatment plan with patient or surrogate and bedside caregivers, discussions with consultants, evaluation of patient's response to treatment, examination of patient, ordering and performing treatments and interventions, ordering and review of laboratory studies, ordering and review of radiographic studies, pulse oximetry, re-evaluation of patient's condition.  This critical care time did not overlap with that of any other provider or involve time for any procedures.      Hesham Cortés MD  Critical Care Surgery  [facility]

## 2019-02-06 LAB
ALBUMIN SERPL BCP-MCNC: 1.3 G/DL
ALBUMIN SERPL BCP-MCNC: 1.4 G/DL
ALP SERPL-CCNC: 786 U/L
ALP SERPL-CCNC: 829 U/L
ALT SERPL W/O P-5'-P-CCNC: 65 U/L
ALT SERPL W/O P-5'-P-CCNC: 66 U/L
ANION GAP SERPL CALC-SCNC: 4 MMOL/L
ANION GAP SERPL CALC-SCNC: 5 MMOL/L
AST SERPL-CCNC: 38 U/L
AST SERPL-CCNC: 38 U/L
BASOPHILS # BLD AUTO: 0.02 K/UL
BASOPHILS # BLD AUTO: 0.02 K/UL
BASOPHILS NFR BLD: 0.1 %
BASOPHILS NFR BLD: 0.2 %
BILIRUB SERPL-MCNC: 4 MG/DL
BILIRUB SERPL-MCNC: 4.1 MG/DL
BUN SERPL-MCNC: 25 MG/DL
BUN SERPL-MCNC: 26 MG/DL
CA-I BLDV-SCNC: 1.16 MMOL/L
CALCIUM SERPL-MCNC: 7.8 MG/DL
CALCIUM SERPL-MCNC: 8.5 MG/DL
CHLORIDE SERPL-SCNC: 104 MMOL/L
CHLORIDE SERPL-SCNC: 105 MMOL/L
CO2 SERPL-SCNC: 25 MMOL/L
CO2 SERPL-SCNC: 27 MMOL/L
CREAT SERPL-MCNC: 0.5 MG/DL
CREAT SERPL-MCNC: 0.6 MG/DL
DIFFERENTIAL METHOD: ABNORMAL
DIFFERENTIAL METHOD: ABNORMAL
EOSINOPHIL # BLD AUTO: 0.7 K/UL
EOSINOPHIL # BLD AUTO: 0.8 K/UL
EOSINOPHIL NFR BLD: 5.2 %
EOSINOPHIL NFR BLD: 6.3 %
ERYTHROCYTE [DISTWIDTH] IN BLOOD BY AUTOMATED COUNT: 19.2 %
ERYTHROCYTE [DISTWIDTH] IN BLOOD BY AUTOMATED COUNT: 19.3 %
EST. GFR  (AFRICAN AMERICAN): >60 ML/MIN/1.73 M^2
EST. GFR  (AFRICAN AMERICAN): >60 ML/MIN/1.73 M^2
EST. GFR  (NON AFRICAN AMERICAN): >60 ML/MIN/1.73 M^2
EST. GFR  (NON AFRICAN AMERICAN): >60 ML/MIN/1.73 M^2
GLUCOSE SERPL-MCNC: 148 MG/DL
GLUCOSE SERPL-MCNC: 180 MG/DL
HCT VFR BLD AUTO: 23.6 %
HCT VFR BLD AUTO: 25.1 %
HGB BLD-MCNC: 7.6 G/DL
HGB BLD-MCNC: 7.8 G/DL
IMM GRANULOCYTES # BLD AUTO: 0.08 K/UL
IMM GRANULOCYTES # BLD AUTO: 0.09 K/UL
IMM GRANULOCYTES NFR BLD AUTO: 0.6 %
IMM GRANULOCYTES NFR BLD AUTO: 0.7 %
LYMPHOCYTES # BLD AUTO: 1.7 K/UL
LYMPHOCYTES # BLD AUTO: 1.7 K/UL
LYMPHOCYTES NFR BLD: 12.8 %
LYMPHOCYTES NFR BLD: 12.8 %
MAGNESIUM SERPL-MCNC: 2.2 MG/DL
MAGNESIUM SERPL-MCNC: 2.2 MG/DL
MCH RBC QN AUTO: 27.4 PG
MCH RBC QN AUTO: 29 PG
MCHC RBC AUTO-ENTMCNC: 31.1 G/DL
MCHC RBC AUTO-ENTMCNC: 32.2 G/DL
MCV RBC AUTO: 88 FL
MCV RBC AUTO: 90 FL
MONOCYTES # BLD AUTO: 0.6 K/UL
MONOCYTES # BLD AUTO: 0.8 K/UL
MONOCYTES NFR BLD: 4.8 %
MONOCYTES NFR BLD: 5.8 %
NEUTROPHILS # BLD AUTO: 10.2 K/UL
NEUTROPHILS # BLD AUTO: 9.8 K/UL
NEUTROPHILS NFR BLD: 74.3 %
NEUTROPHILS NFR BLD: 76.4 %
NRBC BLD-RTO: 0 /100 WBC
NRBC BLD-RTO: 0 /100 WBC
PHOSPHATE SERPL-MCNC: 2.7 MG/DL
PHOSPHATE SERPL-MCNC: 2.7 MG/DL
PLATELET # BLD AUTO: 113 K/UL
PLATELET # BLD AUTO: 123 K/UL
PMV BLD AUTO: 12.3 FL
PMV BLD AUTO: 12.5 FL
POCT GLUCOSE: 133 MG/DL (ref 70–110)
POCT GLUCOSE: 161 MG/DL (ref 70–110)
POCT GLUCOSE: 162 MG/DL (ref 70–110)
POCT GLUCOSE: 169 MG/DL (ref 70–110)
POCT GLUCOSE: 171 MG/DL (ref 70–110)
POCT GLUCOSE: 192 MG/DL (ref 70–110)
POCT GLUCOSE: 196 MG/DL (ref 70–110)
POTASSIUM SERPL-SCNC: 3.6 MMOL/L
POTASSIUM SERPL-SCNC: 4.5 MMOL/L
PROT SERPL-MCNC: 4.6 G/DL
PROT SERPL-MCNC: 4.6 G/DL
RBC # BLD AUTO: 2.62 M/UL
RBC # BLD AUTO: 2.85 M/UL
SODIUM SERPL-SCNC: 134 MMOL/L
SODIUM SERPL-SCNC: 136 MMOL/L
WBC # BLD AUTO: 13.16 K/UL
WBC # BLD AUTO: 13.35 K/UL

## 2019-02-06 PROCEDURE — 94640 AIRWAY INHALATION TREATMENT: CPT

## 2019-02-06 PROCEDURE — 97535 SELF CARE MNGMENT TRAINING: CPT

## 2019-02-06 PROCEDURE — 83735 ASSAY OF MAGNESIUM: CPT

## 2019-02-06 PROCEDURE — A4217 STERILE WATER/SALINE, 500 ML: HCPCS | Performed by: STUDENT IN AN ORGANIZED HEALTH CARE EDUCATION/TRAINING PROGRAM

## 2019-02-06 PROCEDURE — 82330 ASSAY OF CALCIUM: CPT

## 2019-02-06 PROCEDURE — B4185 PARENTERAL SOL 10 GM LIPIDS: HCPCS | Performed by: STUDENT IN AN ORGANIZED HEALTH CARE EDUCATION/TRAINING PROGRAM

## 2019-02-06 PROCEDURE — 80053 COMPREHEN METABOLIC PANEL: CPT | Mod: 91

## 2019-02-06 PROCEDURE — 99900035 HC TECH TIME PER 15 MIN (STAT)

## 2019-02-06 PROCEDURE — 25000003 PHARM REV CODE 250: Performed by: STUDENT IN AN ORGANIZED HEALTH CARE EDUCATION/TRAINING PROGRAM

## 2019-02-06 PROCEDURE — C9113 INJ PANTOPRAZOLE SODIUM, VIA: HCPCS | Performed by: PHYSICIAN ASSISTANT

## 2019-02-06 PROCEDURE — S0030 INJECTION, METRONIDAZOLE: HCPCS | Performed by: SURGERY

## 2019-02-06 PROCEDURE — 63600175 PHARM REV CODE 636 W HCPCS: Performed by: STUDENT IN AN ORGANIZED HEALTH CARE EDUCATION/TRAINING PROGRAM

## 2019-02-06 PROCEDURE — 80053 COMPREHEN METABOLIC PANEL: CPT

## 2019-02-06 PROCEDURE — 99233 PR SUBSEQUENT HOSPITAL CARE,LEVL III: ICD-10-PCS | Mod: 24,GC,, | Performed by: SURGERY

## 2019-02-06 PROCEDURE — 84100 ASSAY OF PHOSPHORUS: CPT

## 2019-02-06 PROCEDURE — 94664 DEMO&/EVAL PT USE INHALER: CPT

## 2019-02-06 PROCEDURE — 63600175 PHARM REV CODE 636 W HCPCS: Performed by: SURGERY

## 2019-02-06 PROCEDURE — 20000000 HC ICU ROOM

## 2019-02-06 PROCEDURE — 25000003 PHARM REV CODE 250: Performed by: SURGERY

## 2019-02-06 PROCEDURE — 85025 COMPLETE CBC W/AUTO DIFF WBC: CPT

## 2019-02-06 PROCEDURE — 94799 UNLISTED PULMONARY SVC/PX: CPT

## 2019-02-06 PROCEDURE — 92526 ORAL FUNCTION THERAPY: CPT

## 2019-02-06 PROCEDURE — 25000242 PHARM REV CODE 250 ALT 637 W/ HCPCS: Performed by: STUDENT IN AN ORGANIZED HEALTH CARE EDUCATION/TRAINING PROGRAM

## 2019-02-06 PROCEDURE — A4216 STERILE WATER/SALINE, 10 ML: HCPCS | Performed by: SURGERY

## 2019-02-06 PROCEDURE — 99233 SBSQ HOSP IP/OBS HIGH 50: CPT | Mod: 24,GC,, | Performed by: SURGERY

## 2019-02-06 PROCEDURE — 84100 ASSAY OF PHOSPHORUS: CPT | Mod: 91

## 2019-02-06 PROCEDURE — 97110 THERAPEUTIC EXERCISES: CPT

## 2019-02-06 PROCEDURE — 83735 ASSAY OF MAGNESIUM: CPT | Mod: 91

## 2019-02-06 PROCEDURE — 63600175 PHARM REV CODE 636 W HCPCS: Performed by: PHYSICIAN ASSISTANT

## 2019-02-06 PROCEDURE — 99900026 HC AIRWAY MAINTENANCE (STAT)

## 2019-02-06 RX ORDER — POTASSIUM CHLORIDE 29.8 MG/ML
40 INJECTION INTRAVENOUS ONCE
Status: COMPLETED | OUTPATIENT
Start: 2019-02-06 | End: 2019-02-06

## 2019-02-06 RX ADMIN — FLUTICASONE FUROATE AND VILANTEROL TRIFENATATE 1 PUFF: 100; 25 POWDER RESPIRATORY (INHALATION) at 08:02

## 2019-02-06 RX ADMIN — HYDROMORPHONE HYDROCHLORIDE 0.5 MG: 1 INJECTION, SOLUTION INTRAMUSCULAR; INTRAVENOUS; SUBCUTANEOUS at 03:02

## 2019-02-06 RX ADMIN — KETOROLAC TROMETHAMINE 15 MG: 15 INJECTION, SOLUTION INTRAMUSCULAR; INTRAVENOUS at 06:02

## 2019-02-06 RX ADMIN — INSULIN ASPART 2 UNITS: 100 INJECTION, SOLUTION INTRAVENOUS; SUBCUTANEOUS at 11:02

## 2019-02-06 RX ADMIN — INSULIN ASPART 2 UNITS: 100 INJECTION, SOLUTION INTRAVENOUS; SUBCUTANEOUS at 08:02

## 2019-02-06 RX ADMIN — HYDROMORPHONE HYDROCHLORIDE 0.5 MG: 1 INJECTION, SOLUTION INTRAMUSCULAR; INTRAVENOUS; SUBCUTANEOUS at 10:02

## 2019-02-06 RX ADMIN — HYDROMORPHONE HYDROCHLORIDE 0.5 MG: 1 INJECTION, SOLUTION INTRAMUSCULAR; INTRAVENOUS; SUBCUTANEOUS at 05:02

## 2019-02-06 RX ADMIN — KETOROLAC TROMETHAMINE 15 MG: 15 INJECTION, SOLUTION INTRAMUSCULAR; INTRAVENOUS at 07:02

## 2019-02-06 RX ADMIN — Medication 10 ML: at 07:02

## 2019-02-06 RX ADMIN — METRONIDAZOLE 500 MG: 500 INJECTION, SOLUTION INTRAVENOUS at 06:02

## 2019-02-06 RX ADMIN — HYDROMORPHONE HYDROCHLORIDE 0.5 MG: 1 INJECTION, SOLUTION INTRAMUSCULAR; INTRAVENOUS; SUBCUTANEOUS at 11:02

## 2019-02-06 RX ADMIN — SOYBEAN OIL 250 ML: 20 INJECTION, SOLUTION INTRAVENOUS at 09:02

## 2019-02-06 RX ADMIN — INSULIN ASPART 2 UNITS: 100 INJECTION, SOLUTION INTRAVENOUS; SUBCUTANEOUS at 05:02

## 2019-02-06 RX ADMIN — METRONIDAZOLE 500 MG: 500 INJECTION, SOLUTION INTRAVENOUS at 01:02

## 2019-02-06 RX ADMIN — IPRATROPIUM BROMIDE AND ALBUTEROL SULFATE 3 ML: .5; 3 SOLUTION RESPIRATORY (INHALATION) at 05:02

## 2019-02-06 RX ADMIN — INSULIN ASPART 1 UNITS: 100 INJECTION, SOLUTION INTRAVENOUS; SUBCUTANEOUS at 04:02

## 2019-02-06 RX ADMIN — PANTOPRAZOLE SODIUM 40 MG: 40 INJECTION, POWDER, LYOPHILIZED, FOR SOLUTION INTRAVENOUS at 08:02

## 2019-02-06 RX ADMIN — INSULIN ASPART 1 UNITS: 100 INJECTION, SOLUTION INTRAVENOUS; SUBCUTANEOUS at 07:02

## 2019-02-06 RX ADMIN — HYDROMORPHONE HYDROCHLORIDE 0.5 MG: 1 INJECTION, SOLUTION INTRAMUSCULAR; INTRAVENOUS; SUBCUTANEOUS at 12:02

## 2019-02-06 RX ADMIN — CALCIUM GLUCONATE: 94 INJECTION, SOLUTION INTRAVENOUS at 09:02

## 2019-02-06 RX ADMIN — FUROSEMIDE 40 MG: 10 INJECTION, SOLUTION INTRAVENOUS at 08:02

## 2019-02-06 RX ADMIN — HYDRALAZINE HYDROCHLORIDE 10 MG: 20 INJECTION INTRAMUSCULAR; INTRAVENOUS at 01:02

## 2019-02-06 RX ADMIN — HYDROMORPHONE HYDROCHLORIDE 0.5 MG: 1 INJECTION, SOLUTION INTRAMUSCULAR; INTRAVENOUS; SUBCUTANEOUS at 08:02

## 2019-02-06 RX ADMIN — ENOXAPARIN SODIUM 40 MG: 100 INJECTION SUBCUTANEOUS at 05:02

## 2019-02-06 RX ADMIN — VANCOMYCIN HYDROCHLORIDE 1000 MG: 1 INJECTION, POWDER, FOR SOLUTION INTRAVENOUS at 12:02

## 2019-02-06 RX ADMIN — METRONIDAZOLE 500 MG: 500 INJECTION, SOLUTION INTRAVENOUS at 10:02

## 2019-02-06 RX ADMIN — HYDROMORPHONE HYDROCHLORIDE 0.5 MG: 1 INJECTION, SOLUTION INTRAMUSCULAR; INTRAVENOUS; SUBCUTANEOUS at 01:02

## 2019-02-06 RX ADMIN — KETOROLAC TROMETHAMINE 15 MG: 15 INJECTION, SOLUTION INTRAMUSCULAR; INTRAVENOUS at 12:02

## 2019-02-06 RX ADMIN — FUROSEMIDE 40 MG: 10 INJECTION, SOLUTION INTRAVENOUS at 05:02

## 2019-02-06 RX ADMIN — HYDROMORPHONE HYDROCHLORIDE 0.5 MG: 1 INJECTION, SOLUTION INTRAMUSCULAR; INTRAVENOUS; SUBCUTANEOUS at 09:02

## 2019-02-06 RX ADMIN — CIPROFLOXACIN 400 MG: 2 INJECTION, SOLUTION INTRAVENOUS at 10:02

## 2019-02-06 RX ADMIN — POTASSIUM CHLORIDE 40 MEQ: 29.8 INJECTION, SOLUTION INTRAVENOUS at 06:02

## 2019-02-06 RX ADMIN — Medication 10 ML: at 11:02

## 2019-02-06 RX ADMIN — MICONAZOLE NITRATE: 20 OINTMENT TOPICAL at 09:02

## 2019-02-06 RX ADMIN — KETOROLAC TROMETHAMINE 15 MG: 15 INJECTION, SOLUTION INTRAMUSCULAR; INTRAVENOUS at 01:02

## 2019-02-06 RX ADMIN — Medication 10 ML: at 06:02

## 2019-02-06 RX ADMIN — Medication 10 ML: at 12:02

## 2019-02-06 RX ADMIN — MICONAZOLE NITRATE: 20 OINTMENT TOPICAL at 08:02

## 2019-02-06 RX ADMIN — CIPROFLOXACIN 400 MG: 2 INJECTION, SOLUTION INTRAVENOUS at 09:02

## 2019-02-06 NOTE — PLAN OF CARE
Problem: SLP Goal  Goal: SLP Goal  Speech Language Pathology Goals  Goals expected to be met by 2/13/2019  1. Pt will participate in further, instrumental assessment via MBSS   2. Educate Pt and family on S/S aspiration and aspiration precautions     Goals expected to be met by 2/6/19  1. Pt will participate in ongoing swallow assessment  2. Educate Pt and family on S/S aspiration and aspiration precautions         Outcome: Ongoing (interventions implemented as appropriate)  Pt seen for ongoing swallow assessment. Pt with increased lingual coordination and articulatory precision this service day. Pt with increased acceptance of PO trials. Pt with overt S/S aspiration with trials presented at the bedside. REC: Further, objective assessment of swallow fx via MBSS to determine safest, least restrictive means of nutrition/hydration/medication. Findings reviewed with Pt and MD team. Patient and MD team ion agreement with findings/recommendations.    NAVID Cobb., [facility]-SLP  Speech-Language Pathology  Pager: [phone]  2/6/2019

## 2019-02-06 NOTE — ASSESSMENT & PLAN NOTE
63 y.o. female with hx of asthma, COPD, CAD, DM, HTN, CVA 2012 with R side deficits, HFpEF, severe debility, lap converted to open appendectomy 8/2018 complicated by cdiff, failure to thrive.  Hx of difficult intubation and delayed emergence, CO2 narcosis needing ICU stay.  Hx of PRN O2 at night. Patient now s/p exploratory laparotomy, washout, and pelvic abscess drainage on 1/25/19.    Post-operatively, on initial evaluation in PACU, patient hypotensive to 60s/40s, on BiPap. Currently fluid resuscitating with 3rd liter of LR, responding well to phenylephrine, starting phenylephrine infusion (tachycardic to ~118). ABG 7.11/49/116/16, BE -14, on bipap 14/5, 35% FiO2. Intubated on 1/26 for airway protection. Extubated 1/28. Required CRRT on 1/26 for metabolic acidosis, now off.     Neuro:  Sedation: None  Pain: multimodal     CV:  - HDS  - Off pressors  - Lactate 3.4 --> 2.3 --> 2.6->2.2->1.8->2.1 (no longer trending)  - 1 unit pRBC 2/1  - 1 unit platelets 1/31  - H/H 7.6/23.6  - PRN hydralazine for HTN    Pulm:   - Extubated 1/28/19  - on RA  - prn CXR  - prn ABG    Renal:  Trend BUN/Cr: 25/0.5  Monitor Urine output (3.7L over past 24 hours)  Net -870  Nephro following, appreciate assistance  Receiving IV lasix 40 mg bid for diuresis    ID:  AF  1/24 blood cultures: gram positive cocci in clusters resembling staph  1/25 blood cultures: NGTD  1/25 operative cultures pending, ngtd thus far  - on cipro/metro  -wound vac removed by wound care 1/31; tolerated well    FEN/GI:  TPN @ 60  Replace lytes PRN  Last lactate 2.1; no longer trending  IV Lasix 40 mg bid  Trend Alk phos, LFTs  -Abdominal U/S 2/1: few gallstones, no acute cholecystitis     Endo:  Patient with IDDM2, on detemir 20u BID at home  1/27-1/28 with hyperglycemia into the 500s on insulin gtt @ 100u/hr  1/28-1/29 with hypoglycemia requiring d10 infusion @ 125cc/hr  SSI with minimal requirements    Heme:  Post-op hgb 8.5   Plt 113   HIT panel negative  No  signs of bleeding at this time  Lovenox    Dispo: Cont ICU care. Stable for possible stepdown? Will discuss with primary team.  Primary: SICU/general surgery

## 2019-02-06 NOTE — PLAN OF CARE
Problem: Adult Inpatient Plan of Care  Goal: Plan of Care Review  Outcome: Ongoing (interventions implemented as appropriate)  Pt remains afebrile, HR 80s-100s, SpO2 >95% RA, PRN hydralazine given to maintain SBP <160.   TPN gtt continued @ 60ml/hr  LUCIANO with minimal serous output.  Urine output monitored (urine bert in color with small clots present since night shift)  Pt AAOx4, moves all extremities, and nods appropriately to questions.   Pt frequently complains of pain to R arm with minimal relief from PRN medications  Wound care provided; wound care RNs rounded at bedside and stated they will round on pt tomorrow and just to reinforce dressing if needed.   Pt updated on plan of care. All questions/concerns addressed. Will continue to monitor.

## 2019-02-06 NOTE — PLAN OF CARE
Problem: Physical Therapy Goal  Goal: Physical Therapy Goal  Goals to be met by: 19    Patient will increase functional independence with mobility by performin. Supine to sit with Moderate Assistance -not met  2. Sit to stand transfer with Maximum Assistance -not met  3. Bed to chair transfer with Maximum Assistance -not met  4. Sitting at edge of bed x10 minutes with Contact Guard Assistance -not met  5. Lower extremity exercise program x10 reps, with assistance as needed - met 2/4      Goals remain appropriate. Tita Laws, PT 2019

## 2019-02-06 NOTE — PLAN OF CARE
Problem: Adult Inpatient Plan of Care  Goal: Plan of Care Review  Outcome: Ongoing (interventions implemented as appropriate)  Patient on room air, sats 95% or >. SBP maintained <160, 10mg of hydralazine administered, once throughout night. Wound care orders followed, PRN pain medication administered as ordered, patietn continues to complain of RUE pain, U/S done yesterday. U/O 125-350/hr. LUCIANO drain with minimal output. Plan of care reviewed, questions and concerns addressed, will continue to monitor.

## 2019-02-06 NOTE — PT/OT/SLP PROGRESS
"Physical Therapy Treatment    Patient Name:  Sheryl Martines   MRN:  [id number]    Recommendations:     Discharge Recommendations:  nursing facility, skilled   Discharge Equipment Recommendations: (will determine DME needs closer to discharge)   Barriers to discharge: Decreased caregiver support family may not be able to assist pt at current functional level.     Assessment:     Sheryl Martines is a 63 y.o. female admitted with a medical diagnosis of Pelvic abscess in female.  She presents with the following impairments/functional limitations:  weakness, impaired balance, impaired endurance, impaired functional mobilty, decreased coordination, decreased lower extremity function, decreased safety awareness, impaired coordination pt ani treatment fair but had pain with all there exer. Pt will benefit from cont skilled PT 2x/wk to progress physically and will need SNF placement to maximize rehab potential. Pt is s/p exp lap, drainage deep pelvic abscess, drainage abdominal wall abscess, wound vac 1/25/19.           Rehab Prognosis: Fair; patient would benefit from acute skilled PT services to address these deficits and reach maximum level of function.    Recent Surgery: Procedure(s) (LRB):  LAPAROTOMY, EXPLORATORY (N/A)  INCISION AND DRAINAGE, ABSCESS-  drainage of pelvic abscess (N/A)  EXCISION, ABSCESS- drainage abdominal wall abscess (N/A)  APPLICATION, WOUND VAC (N/A) 12 Days Post-Op    Plan:     During this hospitalization, patient to be seen 2 x/week to address the identified rehab impairments via therapeutic activities, therapeutic exercises, neuromuscular re-education and progress toward the following goals:    · Plan of Care Expires:  02/24/19    Subjective     Chief Complaint: pt c/o pain during treatment.   Patient/Family Comments/goals:  To get better and go home.   Pain/Comfort:  · Pain Rating 1: 8/10  · Location 1: ("all over")  · Pain Rating Post-Intervention 1: 8/10      Objective:     Communicated with " nurse prior to session.  Patient found supine with  telemetry, pulse ox (continuous), blood pressure cuff, nieves catheter, PICC line(B Jeramie Vincent boots, Port a cath)  upon PT entry to room.     General Precautions: Standard, fall, aspiration, contact   Orthopedic Precautions:    Braces:       Functional Mobility:   not tested.      AM-PAC 6 CLICK MOBILITY  Turning over in bed (including adjusting bedclothes, sheets and blankets)?: 2  Sitting down on and standing up from a chair with arms (e.g., wheelchair, bedside commode, etc.): 1  Moving from lying on back to sitting on the side of the bed?: 2  Moving to and from a bed to a chair (including a wheelchair)?: 1  Need to walk in hospital room?: 1  Climbing 3-5 steps with a railing?: 1  Basic Mobility Total Score: 8       Therapeutic Activities and Exercises:   pt performed AAROM Kymeta exer x 4 extremities x 15 reps in supine.     Patient left supine with all lines intact and call button in reach..    GOALS:   Multidisciplinary Problems     Physical Therapy Goals        Problem: Physical Therapy Goal    Goal Priority Disciplines Outcome Goal Variances Interventions   Physical Therapy Goal     PT, PT/OT      Description:  Goals to be met by: 19    Patient will increase functional independence with mobility by performin. Supine to sit with Moderate Assistance -not met  2. Sit to stand transfer with Maximum Assistance -not met  3. Bed to chair transfer with Maximum Assistance -not met  4. Sitting at edge of bed x10 minutes with Contact Guard Assistance -not met  5. Lower extremity exercise program x10 reps, with assistance as needed - met 2/4                       Time Tracking:     PT Received On: 19  PT Start Time: 1352     PT Stop Time: 1419  PT Total Time (min): 27 min     Billable Minutes: Therapeutic Exercise 27 min    Treatment Type: Treatment  PT/PTA: PT     PTA Visit Number: 0     Tita Laws, PT  2019

## 2019-02-06 NOTE — PT/OT/SLP PROGRESS
"Speech Language Pathology Treatment    Patient Name:  Sheryl Martines   MRN:  [id number]  Admitting Diagnosis: Pelvic abscess in female    Recommendations:                 General Recommendations:  Modified barium swallow study  Diet recommendations:  NPO, Liquid Diet Level: NPO   Aspiration Precautions: Continue alternate means of nutrition, Frequent oral care, Ice chips sparingly and Strict aspiration precautions Continue to monitor for signs and symptoms of aspiration and discontinue oral feeding should you notice any of the following: watery eyes, reddened facial area, wet vocal quality, increased work of breathing, change in respiratory status, increased congestion, coughing, fever, etc.  General Precautions: Standard, aspiration, fall, contact  Communication strategies:  provide increased time to answer and go to room if call light pushed    Subjective     SLP reviewed Patient with nurse and MD team  Pt presents calm, cooperative  She explains, "I don't like pudding and I won't eat applesauce, but I'll try a little if it will help me.  I want to have things like a popsicle and stuff"   Patient goals: to drink     Pain/Comfort:  · Pain Rating 1: 2/10  · Location - Side 1: Right  · Location - Orientation 1: upper  · Pain Addressed 1: Reposition, Cessation of Activity, Distraction, Nurse notified  · Pain Rating Post-Intervention 1: 2/10    Objective:     Has the patient been evaluated by SLP for swallowing?   Yes  Keep patient NPO? No   Current Respiratory Status: room air      Pt seen for ongoing swallow assessment. Pt found awake and alert in bed. Wound care in room as SLP initiated session. Wound care assisted in repositioning Pt in bed and elevated HOB, then exited room as SLP continued session. Pt presented with clear vocal quality and increased lingual protrusion this service day. Pt with decreased irritation along palate/palatine huyen.  Pt with strong, productive cough and throat clear upon command. She " denied difficulty with ice chips. She accepted trials of thin liquids (ice chips x3, tsp sips x2, cup edge sip x1,) nectar-thickened liquids (tsp sips x2) and puree (1/2 tsp bites x3.)   Pt with immediate coughed on cup edge sip thin liquids. Pt with minimal acceptance of nectar-thickened liquids 2/2 dislike of taste.  Pt with delayed, wet change in vocal quality following presentation of puree.  Pt cued to clear throat to clear.  Suction provided via yankauer. Pt educated on SLP role, aspiration precautions and MBSS procedure. Pt verbalized understanding of MBSS procedure and appeared eager to further assess swallow to determine means for nutrition/hydration. No additional questions noted. Whiteboard current. Nurse notified of findings, recs and Pts request for pan medications upon SLP exit from room. MND team notified of findings and recommendation for MBS.      Assessment:     Sheryl Martines is a 63 y.o. female with an SLP diagnosis of Dysphagia.  She presents with improve lingual coordination this service day. She would benefit from further, instrumental assessment via MBSS to assess swallow function.       Goals:   Multidisciplinary Problems     SLP Goals        Problem: SLP Goal    Goal Priority Disciplines Outcome   SLP Goal     SLP Ongoing (interventions implemented as appropriate)   Description:  Speech Language Pathology Goals  Goals expected to be met by 2/13/2019  1. Pt will participate in further, instrumental assessment via MBSS   2. Educate Pt and family on S/S aspiration and aspiration precautions     Goals expected to be met by 2/6/19  1. Pt will participate in ongoing swallow assessment  2. Educate Pt and family on S/S aspiration and aspiration precautions                           Plan:     · Patient to be seen:  3 x/week   · Plan of Care expires:  03/01/19  · Plan of Care reviewed with:  patient   · SLP Follow-Up:  Yes       Discharge recommendations:  nursing facility, skilled   Barriers to  Discharge:  Means of nutrition/hydration not yet established     Time Tracking:     SLP Treatment Date:   02/06/19  Speech Start Time:  1510  Speech Stop Time:  1545     Speech Total Time (min):  35 min    Billable Minutes: Treatment Swallowing Dysfunction 8 and Seld Care/Home Management Training 15    RAJWINDER Cobb, [facility]-SLP  Speech-Language Pathology  Pager: [phone]      02/06/2019

## 2019-02-06 NOTE — PROGRESS NOTES
[facility]  Critical Care - Surgery  Progress Note    Patient Name: Sheryl Martines  MRN: [id number]  Admission Date: 1/23/2019  Hospital Length of Stay: 13 days  Code Status: Full Code  Attending Provider: Monster Laurent MD  Primary Care Provider: Primary Doctor No   Principal Problem: Pelvic abscess in female    Subjective:     Hospital/ICU Course:  No notes on file    Interval History/Significant Events: No acute events overnight. Pain in RUE continues but controlled with prn pain medication. U/S of RUE negative for DVT. Patient continues to ask for consideration for stepdown as she feels well and would like to be able to see her family more.       Follow-up For: Procedure(s) (LRB):  LAPAROTOMY, EXPLORATORY (N/A)  INCISION AND DRAINAGE, ABSCESS-  drainage of pelvic abscess (N/A)  EXCISION, ABSCESS- drainage abdominal wall abscess (N/A)  APPLICATION, WOUND VAC (N/A)    Post-Operative Day: 9 Days Post-Op    Objective:     Vital Signs (Most Recent):  Temp: 98.9 °F (37.2 °C) (02/06/19 0300)  Pulse: 93 (02/06/19 0815)  Resp: 14 (02/06/19 0815)  BP: (!) 160/73 (02/06/19 0800)  SpO2: 97 % (02/06/19 0815) Vital Signs (24h Range):  Temp:  [97.3 °F (36.3 °C)-98.9 °F (37.2 °C)] 98.9 °F (37.2 °C)  Pulse:  [] 93  Resp:  [11-40] 14  SpO2:  [78 %-99 %] 97 %  BP: ()/(52-93) 160/73     Weight: 71.2 kg (157 lb)  Body mass index is 29.66 kg/m².      Intake/Output Summary (Last 24 hours) at 2/6/2019 0828  Last data filed at 2/6/2019 0800  Gross per 24 hour   Intake 2679.8 ml   Output 3610 ml   Net -930.2 ml       Physical Exam     Constitutional: She appears well-developed and well-nourished. No distress.   HENT:   Head: Normocephalic.   Eyes: Pupils are equal, round, and reactive to light.   Cardiovascular: Regular rate and rhythm.   Pulmonary/Chest: No respiratory distress. She has no wheezes.   Extubated on RA.   Abdominal: Soft. She exhibits no distension.   Neurological: AAOx3  Skin: Skin  is warm and dry.       Vents: Not on Ventilator.      Lines/Drains/Airways     Peripherally Inserted Central Catheter Line                 PICC Double Lumen 02/04/19 1757 left brachial 1 day          Central Venous Catheter Line                 Port A Cath Single Lumen 01/23/19 1400 right subclavian 13 days          Drain                 Closed/Suction Drain 01/25/19 1020 Right;Ventral Abdomen Bulb 19 Fr. 11 days         Urethral Catheter 01/25/19 0934 Straight-tip;Non-latex 16 Fr. 11 days                Significant Labs:    CBC/Anemia Profile:  Recent Labs   Lab 02/05/19  0808 02/05/19 1944 02/06/19  0400   WBC 13.53* 14.03* 13.35*   HGB 7.8* 7.5* 7.6*   HCT 24.9* 24.4* 23.6*   * 123* 113*   MCV 87 89 90   RDW 19.3* 19.1* 19.2*        Chemistries:  Recent Labs   Lab 02/05/19  0808 02/05/19  1614 02/05/19 1944 02/06/19  0400     --  136 134*   K 3.7  --  3.5 3.6     --  103 105   CO2 29  --  28 25   BUN 27*  --  26* 25*   CREATININE 0.6  --  0.6 0.5   CALCIUM 8.3*  --  8.3* 7.8*   ALBUMIN 1.4*  --  1.4* 1.3*   PROT 4.4*  --  4.0* 4.6*   BILITOT 4.1*  --  4.2* 4.0*   ALKPHOS 939*  --  870* 786*   ALT 93*  --  77* 65*   AST 57*  --  43* 38   MG 1.9 1.7 1.7 2.2   PHOS 3.3 2.9 3.0 2.7         Significant Imaging:  I have reviewed all pertinent imaging results/findings within the past 24 hours.Interval History/Significant Events: No acute events overnight. Pain in RUE continues but controlled with prn pain medication. U/S of RUE negative for DVT. Patient states that she has pain and weakness in right side 2/2 past stroke.       Follow-up For: Procedure(s) (LRB):  LAPAROTOMY, EXPLORATORY (N/A)  INCISION AND DRAINAGE, ABSCESS-  drainage of pelvic abscess (N/A)  EXCISION, ABSCESS- drainage abdominal wall abscess (N/A)  APPLICATION, WOUND VAC (N/A)    Post-Operative Day: 9 Days Post-Op    Objective:     Vital Signs (Most Recent):  Temp: 98.5 °F (36.9 °C) (02/05/19 0300)  Pulse: 91 (02/05/19 0600)  Resp:  18 (02/05/19 0600)  BP: (!) 168/77 (02/05/19 0600)  SpO2: 97 % (02/05/19 0600) Vital Signs (24h Range):  Temp:  [97.8 °F (36.6 °C)-99.3 °F (37.4 °C)] 98.5 °F (36.9 °C)  Pulse:  [] 91  Resp:  [11-26] 18  SpO2:  [93 %-100 %] 97 %  BP: (104-177)/() 168/77     Weight: 71.2 kg (157 lb)  Body mass index is 29.66 kg/m².      Intake/Output Summary (Last 24 hours) at 2/5/2019 0626  Last data filed at 2/5/2019 0600  Gross per 24 hour   Intake 3770.4 ml   Output 3377 ml   Net 393.4 ml       Physical Exam     Constitutional: She appears well-developed and well-nourished. No distress.   HENT:   Head: Normocephalic.   Eyes: Pupils are equal, round, and reactive to light.   Cardiovascular: Regular rate and rhythm.   Pulmonary/Chest: No respiratory distress. She has no wheezes.   Extubated on RA.   Abdominal: Soft. She exhibits no distension.   Neurological:   lethargic   Skin: Skin is warm and dry.       Vents: Not on Ventilator.      Lines/Drains/Airways     Peripherally Inserted Central Catheter Line                 PICC Double Lumen 02/04/19 1757 left brachial less than 1 day          Central Venous Catheter Line                 Port A Cath Single Lumen 01/23/19 1400 right subclavian 12 days          Drain                 Closed/Suction Drain 01/25/19 1020 Right;Ventral Abdomen Bulb 19 Fr. 10 days         Urethral Catheter 01/25/19 0934 Straight-tip;Non-latex 16 Fr. 10 days                Significant Labs:    CBC/Anemia Profile:  Recent Labs   Lab 02/03/19 2003 02/04/19 0759 02/04/19 1951   WBC 10.27 10.59 11.14   HGB 8.1* 8.4* 9.1*   HCT 24.8* 25.0* 29.0*   * 102* 100*   MCV 85 84 86   RDW 19.1* 19.7* 19.4*        Chemistries:  Recent Labs   Lab 02/03/19 2003 02/04/19 0759 02/04/19  1614 02/04/19 1951 02/05/19  0000     --  139  --  138  --    K 4.8  --  4.3  --  4.3  --    CL 98  --  98  --  99  --    CO2 35*  --  36*  --  35*  --    BUN 29*  --  29*  --  30*  --    CREATININE 0.5  --  0.5  --   0.6  --    CALCIUM 8.0*  --  8.2*  --  8.5*  --    ALBUMIN 1.5*  --  1.5*  --  1.6*  --    PROT 4.4*  --  4.2*  --  4.2*  --    BILITOT 4.0*  --  4.0*  --  4.3*  --    ALKPHOS 1,098*  --  1,111*  --  1,057*  --    *  --  139*  --  121*  --    *  --  125*  --  83*  --    MG  --    < > 2.0 1.9  --  2.0   PHOS  --    < > 3.3 3.8  --  3.7    < > = values in this interval not displayed.         Significant Imaging:  I have reviewed all pertinent imaging results/findings within the past 24 hours.Interval History/Significant Events: No acute events overnight. Pain in RUE continues but controlled with prn pain medication. U/S of RUE negative for DVT. Patient states that she has pain and weakness in right side 2/2 past stroke.       Follow-up For: Procedure(s) (LRB):  LAPAROTOMY, EXPLORATORY (N/A)  INCISION AND DRAINAGE, ABSCESS-  drainage of pelvic abscess (N/A)  EXCISION, ABSCESS- drainage abdominal wall abscess (N/A)  APPLICATION, WOUND VAC (N/A)    Post-Operative Day: 9 Days Post-Op    Objective:     Vital Signs (Most Recent):  Temp: 98.5 °F (36.9 °C) (02/05/19 0300)  Pulse: 91 (02/05/19 0600)  Resp: 18 (02/05/19 0600)  BP: (!) 168/77 (02/05/19 0600)  SpO2: 97 % (02/05/19 0600) Vital Signs (24h Range):  Temp:  [97.8 °F (36.6 °C)-99.3 °F (37.4 °C)] 98.5 °F (36.9 °C)  Pulse:  [] 91  Resp:  [11-26] 18  SpO2:  [93 %-100 %] 97 %  BP: (104-177)/() 168/77     Weight: 71.2 kg (157 lb)  Body mass index is 29.66 kg/m².      Intake/Output Summary (Last 24 hours) at 2/5/2019 0626  Last data filed at 2/5/2019 0600  Gross per 24 hour   Intake 3770.4 ml   Output 3377 ml   Net 393.4 ml       Physical Exam     Constitutional: She appears well-developed and well-nourished. No distress.   HENT:   Head: Normocephalic.   Eyes: Pupils are equal, round, and reactive to light.   Cardiovascular: Regular rate and rhythm.   Pulmonary/Chest: No respiratory distress. She has no wheezes.   Extubated on RA.   Abdominal: Soft.  She exhibits no distension.   Neurological:   lethargic   Skin: Skin is warm and dry.       Vents: Not on Ventilator.      Lines/Drains/Airways     Peripherally Inserted Central Catheter Line                 PICC Double Lumen 02/04/19 1757 left brachial less than 1 day          Central Venous Catheter Line                 Port A Cath Single Lumen 01/23/19 1400 right subclavian 12 days          Drain                 Closed/Suction Drain 01/25/19 1020 Right;Ventral Abdomen Bulb 19 Fr. 10 days         Urethral Catheter 01/25/19 0934 Straight-tip;Non-latex 16 Fr. 10 days                Significant Labs:    CBC/Anemia Profile:  Recent Labs   Lab 02/03/19 2003 02/04/19 0759 02/04/19 1951   WBC 10.27 10.59 11.14   HGB 8.1* 8.4* 9.1*   HCT 24.8* 25.0* 29.0*   * 102* 100*   MCV 85 84 86   RDW 19.1* 19.7* 19.4*        Chemistries:  Recent Labs   Lab 02/03/19 2003 02/04/19 0759 02/04/19  1614 02/04/19 1951 02/05/19  0000     --  139  --  138  --    K 4.8  --  4.3  --  4.3  --    CL 98  --  98  --  99  --    CO2 35*  --  36*  --  35*  --    BUN 29*  --  29*  --  30*  --    CREATININE 0.5  --  0.5  --  0.6  --    CALCIUM 8.0*  --  8.2*  --  8.5*  --    ALBUMIN 1.5*  --  1.5*  --  1.6*  --    PROT 4.4*  --  4.2*  --  4.2*  --    BILITOT 4.0*  --  4.0*  --  4.3*  --    ALKPHOS 1,098*  --  1,111*  --  1,057*  --    *  --  139*  --  121*  --    *  --  125*  --  83*  --    MG  --    < > 2.0 1.9  --  2.0   PHOS  --    < > 3.3 3.8  --  3.7    < > = values in this interval not displayed.         Significant Imaging:  I have reviewed all pertinent imaging results/findings within the past 24 hours.Interval History/Significant Events: No acute events overnight. Pain controlled with prn pain medication. Continues with 40 mg lasix bid for diuresis; UOP  cc/hr overnight. Platelet numbers slowly recovering (142 this am on labs).    Follow-up For: Procedure(s) (LRB):  LAPAROTOMY, EXPLORATORY  (N/A)  INCISION AND DRAINAGE, ABSCESS-  drainage of pelvic abscess (N/A)  EXCISION, ABSCESS- drainage abdominal wall abscess (N/A)  APPLICATION, WOUND VAC (N/A)    Post-Operative Day: 9 Days Post-Op    Objective:     Vital Signs (Most Recent):  Temp: 98.5 °F (36.9 °C) (02/05/19 0300)  Pulse: 91 (02/05/19 0600)  Resp: 18 (02/05/19 0600)  BP: (!) 168/77 (02/05/19 0600)  SpO2: 97 % (02/05/19 0600) Vital Signs (24h Range):  Temp:  [97.8 °F (36.6 °C)-99.3 °F (37.4 °C)] 98.5 °F (36.9 °C)  Pulse:  [] 91  Resp:  [11-26] 18  SpO2:  [93 %-100 %] 97 %  BP: (104-177)/() 168/77     Weight: 71.2 kg (157 lb)  Body mass index is 29.66 kg/m².      Intake/Output Summary (Last 24 hours) at 2/5/2019 0626  Last data filed at 2/5/2019 0600  Gross per 24 hour   Intake 3770.4 ml   Output 3377 ml   Net 393.4 ml       Physical Exam   Constitutional: She appears well-developed and well-nourished. No distress.   HENT:   Head: Normocephalic.   Eyes: Pupils are equal, round, and reactive to light.   Cardiovascular: Regular rate and rhythm.   Pulmonary/Chest: No respiratory distress. She has no wheezes.   Extubated on RA.   Abdominal: Soft. She exhibits no distension.   Neurological:   lethargic   Skin: Skin is warm and dry.       Vents: Not on Ventilator.      Lines/Drains/Airways     Peripherally Inserted Central Catheter Line                 PICC Double Lumen 02/04/19 1757 left brachial less than 1 day          Central Venous Catheter Line                 Port A Cath Single Lumen 01/23/19 1400 right subclavian 12 days          Drain                 Closed/Suction Drain 01/25/19 1020 Right;Ventral Abdomen Bulb 19 Fr. 10 days         Urethral Catheter 01/25/19 0934 Straight-tip;Non-latex 16 Fr. 10 days                Significant Labs:    CBC/Anemia Profile:  Recent Labs   Lab 02/03/19 2003 02/04/19  0759 02/04/19 1951   WBC 10.27 10.59 11.14   HGB 8.1* 8.4* 9.1*   HCT 24.8* 25.0* 29.0*   * 102* 100*   MCV 85 84 86   RDW  19.1* 19.7* 19.4*        Chemistries:  Recent Labs   Lab 02/03/19 2003 02/04/19  0759 02/04/19  1614 02/04/19  1951 02/05/19  0000     --  139  --  138  --    K 4.8  --  4.3  --  4.3  --    CL 98  --  98  --  99  --    CO2 35*  --  36*  --  35*  --    BUN 29*  --  29*  --  30*  --    CREATININE 0.5  --  0.5  --  0.6  --    CALCIUM 8.0*  --  8.2*  --  8.5*  --    ALBUMIN 1.5*  --  1.5*  --  1.6*  --    PROT 4.4*  --  4.2*  --  4.2*  --    BILITOT 4.0*  --  4.0*  --  4.3*  --    ALKPHOS 1,098*  --  1,111*  --  1,057*  --    *  --  139*  --  121*  --    *  --  125*  --  83*  --    MG  --    < > 2.0 1.9  --  2.0   PHOS  --    < > 3.3 3.8  --  3.7    < > = values in this interval not displayed.         Significant Imaging:  I have reviewed all pertinent imaging results/findings within the past 24 hours.    Assessment/Plan:     * Pelvic abscess in female    63 y.o. female with hx of asthma, COPD, CAD, DM, HTN, CVA 2012 with R side deficits, HFpEF, severe debility, lap converted to open appendectomy 8/2018 complicated by cdiff, failure to thrive.  Hx of difficult intubation and delayed emergence, CO2 narcosis needing ICU stay.  Hx of PRN O2 at night. Patient now s/p exploratory laparotomy, washout, and pelvic abscess drainage on 1/25/19.    Post-operatively, on initial evaluation in PACU, patient hypotensive to 60s/40s, on BiPap. Currently fluid resuscitating with 3rd liter of LR, responding well to phenylephrine, starting phenylephrine infusion (tachycardic to ~118). ABG 7.11/49/116/16, BE -14, on bipap 14/5, 35% FiO2. Intubated on 1/26 for airway protection. Extubated 1/28. Required CRRT on 1/26 for metabolic acidosis, now off.     Neuro:  Sedation: None  Pain: multimodal     CV:  - HDS  - Off pressors  - Lactate 3.4 --> 2.3 --> 2.6->2.2->1.8->2.1 (no longer trending)  - 1 unit pRBC 2/1  - 1 unit platelets 1/31  - H/H 7.6/23.6  - PRN hydralazine for HTN    Pulm:   - Extubated 1/28/19  - on RA  - prn  CXR  - prn ABG    Renal:  Trend BUN/Cr: 25/0.5  Monitor Urine output (3.7L over past 24 hours)  Net -870  Nephro following, appreciate assistance  Receiving IV lasix 40 mg bid for diuresis    ID:  AF  1/24 blood cultures: gram positive cocci in clusters resembling staph  1/25 blood cultures: NGTD  1/25 operative cultures pending, ngtd thus far  - on cipro/metro  -wound vac removed by wound care 1/31; tolerated well    FEN/GI:  TPN @ 60  Replace lytes PRN  Last lactate 2.1; no longer trending  IV Lasix 40 mg bid  Trend Alk phos, LFTs  -Abdominal U/S 2/1: few gallstones, no acute cholecystitis     Endo:  Patient with IDDM2, on detemir 20u BID at home  1/27-1/28 with hyperglycemia into the 500s on insulin gtt @ 100u/hr  1/28-1/29 with hypoglycemia requiring d10 infusion @ 125cc/hr  SSI with minimal requirements    Heme:  Post-op hgb 8.5   Plt 113   HIT panel negative  No signs of bleeding at this time  Lovenox    Dispo: Cont ICU care. Stable for possible stepdown? Will discuss with primary team.  Primary: SICU/general surgery            Critical care was time spent personally by me on the following activities: development of treatment plan with patient or surrogate and bedside caregivers, discussions with consultants, evaluation of patient's response to treatment, examination of patient, ordering and performing treatments and interventions, ordering and review of laboratory studies, ordering and review of radiographic studies, pulse oximetry, re-evaluation of patient's condition.  This critical care time did not overlap with that of any other provider or involve time for any procedures.     Hesham Cortés MD  Critical Care - Surgery  [facility]

## 2019-02-06 NOTE — CARE UPDATE
BG goal 140-180     Patient has a history of DM  Remains NPO on TPN  Minimal correction scale insulin requirements overnight  Continue moderate dose correction scale with BG monitoring q 4 hours     Endocrine to continue to follow     ** Please call Endocrine for any BG related issues **

## 2019-02-06 NOTE — PLAN OF CARE
Problem: Adult Inpatient Plan of Care  Goal: Plan of Care Review  Outcome: Ongoing (interventions implemented as appropriate)  Pt AOx4, follows commands. O2 sats % on room air. BP within ordered parameters. Afebrile. Gtts: TPN/lipids. UOP adequate. Minimal output from LUCIANO drain. Abdominal dressing change complete at bedside by wound care nurses. Patient worked with PT this afternoon. Swallow evaluation complete at bedside by speech therapist. Patient complaining of pain in R arm and R leg, SICU team aware. Pain controlled with prn dilauded. Patient repositioned Q2 to prevent skin breakdown. No falls/injuries throughout the shift. BG/labs closely monitored. Plan for possible barium swallow study tomorrow. Plan of care reviewed with pt/family, all questions and concerns addressed. VSS at this time, will continue to monitor. See flow sheet for full assessment details.

## 2019-02-07 LAB
ALBUMIN SERPL BCP-MCNC: 1.3 G/DL
ALBUMIN SERPL BCP-MCNC: 1.4 G/DL
ALP SERPL-CCNC: 733 U/L
ALP SERPL-CCNC: 779 U/L
ALT SERPL W/O P-5'-P-CCNC: 44 U/L
ALT SERPL W/O P-5'-P-CCNC: 54 U/L
ANION GAP SERPL CALC-SCNC: 4 MMOL/L
ANION GAP SERPL CALC-SCNC: 5 MMOL/L
AST SERPL-CCNC: 39 U/L
AST SERPL-CCNC: 44 U/L
BASOPHILS # BLD AUTO: 0.03 K/UL
BASOPHILS # BLD AUTO: 0.04 K/UL
BASOPHILS NFR BLD: 0.3 %
BASOPHILS NFR BLD: 0.4 %
BILIRUB SERPL-MCNC: 4 MG/DL
BILIRUB SERPL-MCNC: 4.1 MG/DL
BUN SERPL-MCNC: 27 MG/DL
BUN SERPL-MCNC: 29 MG/DL
CA-I BLDV-SCNC: 1.15 MMOL/L
CALCIUM SERPL-MCNC: 8.5 MG/DL
CALCIUM SERPL-MCNC: 8.7 MG/DL
CHLORIDE SERPL-SCNC: 104 MMOL/L
CHLORIDE SERPL-SCNC: 105 MMOL/L
CO2 SERPL-SCNC: 26 MMOL/L
CO2 SERPL-SCNC: 26 MMOL/L
CREAT SERPL-MCNC: 0.6 MG/DL
CREAT SERPL-MCNC: 0.6 MG/DL
DIFFERENTIAL METHOD: ABNORMAL
DIFFERENTIAL METHOD: ABNORMAL
EOSINOPHIL # BLD AUTO: 0.7 K/UL
EOSINOPHIL # BLD AUTO: 0.8 K/UL
EOSINOPHIL NFR BLD: 6.6 %
EOSINOPHIL NFR BLD: 7.3 %
ERYTHROCYTE [DISTWIDTH] IN BLOOD BY AUTOMATED COUNT: 18.7 %
ERYTHROCYTE [DISTWIDTH] IN BLOOD BY AUTOMATED COUNT: 18.9 %
EST. GFR  (AFRICAN AMERICAN): >60 ML/MIN/1.73 M^2
EST. GFR  (AFRICAN AMERICAN): >60 ML/MIN/1.73 M^2
EST. GFR  (NON AFRICAN AMERICAN): >60 ML/MIN/1.73 M^2
EST. GFR  (NON AFRICAN AMERICAN): >60 ML/MIN/1.73 M^2
GLUCOSE SERPL-MCNC: 119 MG/DL
GLUCOSE SERPL-MCNC: 134 MG/DL
HCT VFR BLD AUTO: 22.7 %
HCT VFR BLD AUTO: 23.8 %
HGB BLD-MCNC: 7 G/DL
HGB BLD-MCNC: 7.7 G/DL
IMM GRANULOCYTES # BLD AUTO: 0.06 K/UL
IMM GRANULOCYTES # BLD AUTO: 0.1 K/UL
IMM GRANULOCYTES NFR BLD AUTO: 0.6 %
IMM GRANULOCYTES NFR BLD AUTO: 0.9 %
LACTATE SERPL-SCNC: 0.9 MMOL/L
LYMPHOCYTES # BLD AUTO: 1.6 K/UL
LYMPHOCYTES # BLD AUTO: 1.7 K/UL
LYMPHOCYTES NFR BLD: 15.2 %
LYMPHOCYTES NFR BLD: 16.3 %
MAGNESIUM SERPL-MCNC: 2.2 MG/DL
MCH RBC QN AUTO: 27.3 PG
MCH RBC QN AUTO: 29.4 PG
MCHC RBC AUTO-ENTMCNC: 30.8 G/DL
MCHC RBC AUTO-ENTMCNC: 32.4 G/DL
MCV RBC AUTO: 89 FL
MCV RBC AUTO: 91 FL
MONOCYTES # BLD AUTO: 0.9 K/UL
MONOCYTES # BLD AUTO: 1 K/UL
MONOCYTES NFR BLD: 10.3 %
MONOCYTES NFR BLD: 8 %
NEUTROPHILS # BLD AUTO: 6.4 K/UL
NEUTROPHILS # BLD AUTO: 7.8 K/UL
NEUTROPHILS NFR BLD: 65.2 %
NEUTROPHILS NFR BLD: 68.9 %
NRBC BLD-RTO: 0 /100 WBC
NRBC BLD-RTO: 0 /100 WBC
PHOSPHATE SERPL-MCNC: 3.4 MG/DL
PLATELET # BLD AUTO: 129 K/UL
PLATELET # BLD AUTO: 141 K/UL
PMV BLD AUTO: 12 FL
PMV BLD AUTO: 12.3 FL
POCT GLUCOSE: 121 MG/DL (ref 70–110)
POCT GLUCOSE: 156 MG/DL (ref 70–110)
POCT GLUCOSE: 158 MG/DL (ref 70–110)
POCT GLUCOSE: 172 MG/DL (ref 70–110)
POCT GLUCOSE: 189 MG/DL (ref 70–110)
POTASSIUM SERPL-SCNC: 4.1 MMOL/L
POTASSIUM SERPL-SCNC: 4.4 MMOL/L
PREALB SERPL-MCNC: 6 MG/DL
PREALB SERPL-MCNC: 6 MG/DL
PROT SERPL-MCNC: 4.3 G/DL
PROT SERPL-MCNC: 4.4 G/DL
RBC # BLD AUTO: 2.56 M/UL
RBC # BLD AUTO: 2.62 M/UL
SODIUM SERPL-SCNC: 134 MMOL/L
SODIUM SERPL-SCNC: 136 MMOL/L
TRIGL SERPL-MCNC: 286 MG/DL
WBC # BLD AUTO: 11.31 K/UL
WBC # BLD AUTO: 9.79 K/UL

## 2019-02-07 PROCEDURE — 63600175 PHARM REV CODE 636 W HCPCS: Performed by: STUDENT IN AN ORGANIZED HEALTH CARE EDUCATION/TRAINING PROGRAM

## 2019-02-07 PROCEDURE — 83605 ASSAY OF LACTIC ACID: CPT

## 2019-02-07 PROCEDURE — 84134 ASSAY OF PREALBUMIN: CPT | Mod: 91

## 2019-02-07 PROCEDURE — 80053 COMPREHEN METABOLIC PANEL: CPT

## 2019-02-07 PROCEDURE — 80053 COMPREHEN METABOLIC PANEL: CPT | Mod: 91

## 2019-02-07 PROCEDURE — 25000003 PHARM REV CODE 250: Performed by: SURGERY

## 2019-02-07 PROCEDURE — S0030 INJECTION, METRONIDAZOLE: HCPCS | Performed by: SURGERY

## 2019-02-07 PROCEDURE — 97530 THERAPEUTIC ACTIVITIES: CPT

## 2019-02-07 PROCEDURE — 25000242 PHARM REV CODE 250 ALT 637 W/ HCPCS: Performed by: STUDENT IN AN ORGANIZED HEALTH CARE EDUCATION/TRAINING PROGRAM

## 2019-02-07 PROCEDURE — 84478 ASSAY OF TRIGLYCERIDES: CPT

## 2019-02-07 PROCEDURE — C9113 INJ PANTOPRAZOLE SODIUM, VIA: HCPCS | Performed by: PHYSICIAN ASSISTANT

## 2019-02-07 PROCEDURE — 63600175 PHARM REV CODE 636 W HCPCS: Performed by: SURGERY

## 2019-02-07 PROCEDURE — 92611 MOTION FLUOROSCOPY/SWALLOW: CPT

## 2019-02-07 PROCEDURE — 85025 COMPLETE CBC W/AUTO DIFF WBC: CPT | Mod: 91

## 2019-02-07 PROCEDURE — 25000003 PHARM REV CODE 250: Performed by: STUDENT IN AN ORGANIZED HEALTH CARE EDUCATION/TRAINING PROGRAM

## 2019-02-07 PROCEDURE — 63600175 PHARM REV CODE 636 W HCPCS: Performed by: PHYSICIAN ASSISTANT

## 2019-02-07 PROCEDURE — 99900035 HC TECH TIME PER 15 MIN (STAT)

## 2019-02-07 PROCEDURE — 20600001 HC STEP DOWN PRIVATE ROOM

## 2019-02-07 PROCEDURE — 94761 N-INVAS EAR/PLS OXIMETRY MLT: CPT

## 2019-02-07 PROCEDURE — 84100 ASSAY OF PHOSPHORUS: CPT

## 2019-02-07 PROCEDURE — 83735 ASSAY OF MAGNESIUM: CPT

## 2019-02-07 PROCEDURE — A4216 STERILE WATER/SALINE, 10 ML: HCPCS | Performed by: SURGERY

## 2019-02-07 PROCEDURE — 94664 DEMO&/EVAL PT USE INHALER: CPT

## 2019-02-07 PROCEDURE — B4185 PARENTERAL SOL 10 GM LIPIDS: HCPCS | Performed by: STUDENT IN AN ORGANIZED HEALTH CARE EDUCATION/TRAINING PROGRAM

## 2019-02-07 PROCEDURE — 94640 AIRWAY INHALATION TREATMENT: CPT

## 2019-02-07 PROCEDURE — 84134 ASSAY OF PREALBUMIN: CPT

## 2019-02-07 PROCEDURE — 99233 SBSQ HOSP IP/OBS HIGH 50: CPT | Mod: 24,GC,, | Performed by: SURGERY

## 2019-02-07 PROCEDURE — 82330 ASSAY OF CALCIUM: CPT

## 2019-02-07 PROCEDURE — A4217 STERILE WATER/SALINE, 500 ML: HCPCS | Performed by: STUDENT IN AN ORGANIZED HEALTH CARE EDUCATION/TRAINING PROGRAM

## 2019-02-07 PROCEDURE — 99233 PR SUBSEQUENT HOSPITAL CARE,LEVL III: ICD-10-PCS | Mod: 24,GC,, | Performed by: SURGERY

## 2019-02-07 RX ORDER — KETOROLAC TROMETHAMINE 15 MG/ML
15 INJECTION, SOLUTION INTRAMUSCULAR; INTRAVENOUS EVERY 6 HOURS PRN
Status: DISPENSED | OUTPATIENT
Start: 2019-02-07 | End: 2019-02-09

## 2019-02-07 RX ORDER — DILTIAZEM HYDROCHLORIDE 180 MG/1
180 CAPSULE, COATED, EXTENDED RELEASE ORAL DAILY
Status: DISCONTINUED | OUTPATIENT
Start: 2019-02-07 | End: 2019-02-20

## 2019-02-07 RX ORDER — NAPROXEN SODIUM 220 MG/1
81 TABLET, FILM COATED ORAL DAILY
Status: DISCONTINUED | OUTPATIENT
Start: 2019-02-07 | End: 2019-02-25

## 2019-02-07 RX ADMIN — HYDROMORPHONE HYDROCHLORIDE 0.5 MG: 1 INJECTION, SOLUTION INTRAMUSCULAR; INTRAVENOUS; SUBCUTANEOUS at 07:02

## 2019-02-07 RX ADMIN — ENOXAPARIN SODIUM 40 MG: 100 INJECTION SUBCUTANEOUS at 05:02

## 2019-02-07 RX ADMIN — HYDROMORPHONE HYDROCHLORIDE 0.5 MG: 1 INJECTION, SOLUTION INTRAMUSCULAR; INTRAVENOUS; SUBCUTANEOUS at 05:02

## 2019-02-07 RX ADMIN — VANCOMYCIN HYDROCHLORIDE 1000 MG: 1 INJECTION, POWDER, FOR SOLUTION INTRAVENOUS at 12:02

## 2019-02-07 RX ADMIN — INSULIN ASPART 1 UNITS: 100 INJECTION, SOLUTION INTRAVENOUS; SUBCUTANEOUS at 03:02

## 2019-02-07 RX ADMIN — METRONIDAZOLE 500 MG: 500 INJECTION, SOLUTION INTRAVENOUS at 01:02

## 2019-02-07 RX ADMIN — SODIUM CHLORIDE, SODIUM LACTATE, POTASSIUM CHLORIDE, AND CALCIUM CHLORIDE 1000 ML: .6; .31; .03; .02 INJECTION, SOLUTION INTRAVENOUS at 06:02

## 2019-02-07 RX ADMIN — FUROSEMIDE 40 MG: 10 INJECTION, SOLUTION INTRAVENOUS at 05:02

## 2019-02-07 RX ADMIN — KETOROLAC TROMETHAMINE 15 MG: 15 INJECTION, SOLUTION INTRAMUSCULAR; INTRAVENOUS at 11:02

## 2019-02-07 RX ADMIN — POTASSIUM CHLORIDE: 2 INJECTION, SOLUTION, CONCENTRATE INTRAVENOUS at 10:02

## 2019-02-07 RX ADMIN — METRONIDAZOLE 500 MG: 500 INJECTION, SOLUTION INTRAVENOUS at 09:02

## 2019-02-07 RX ADMIN — MICONAZOLE NITRATE: 20 OINTMENT TOPICAL at 08:02

## 2019-02-07 RX ADMIN — KETOROLAC TROMETHAMINE 15 MG: 15 INJECTION, SOLUTION INTRAMUSCULAR; INTRAVENOUS at 04:02

## 2019-02-07 RX ADMIN — HYDROMORPHONE HYDROCHLORIDE 0.5 MG: 1 INJECTION, SOLUTION INTRAMUSCULAR; INTRAVENOUS; SUBCUTANEOUS at 10:02

## 2019-02-07 RX ADMIN — KETOROLAC TROMETHAMINE 15 MG: 15 INJECTION, SOLUTION INTRAMUSCULAR; INTRAVENOUS at 03:02

## 2019-02-07 RX ADMIN — DIPHENHYDRAMINE HYDROCHLORIDE 12.5 MG: 50 INJECTION, SOLUTION INTRAMUSCULAR; INTRAVENOUS at 01:02

## 2019-02-07 RX ADMIN — IPRATROPIUM BROMIDE AND ALBUTEROL SULFATE 3 ML: .5; 3 SOLUTION RESPIRATORY (INHALATION) at 07:02

## 2019-02-07 RX ADMIN — HYDROMORPHONE HYDROCHLORIDE 0.5 MG: 1 INJECTION, SOLUTION INTRAMUSCULAR; INTRAVENOUS; SUBCUTANEOUS at 01:02

## 2019-02-07 RX ADMIN — CIPROFLOXACIN 400 MG: 2 INJECTION, SOLUTION INTRAVENOUS at 10:02

## 2019-02-07 RX ADMIN — FLUTICASONE FUROATE AND VILANTEROL TRIFENATATE 1 PUFF: 100; 25 POWDER RESPIRATORY (INHALATION) at 08:02

## 2019-02-07 RX ADMIN — FUROSEMIDE 40 MG: 10 INJECTION, SOLUTION INTRAVENOUS at 08:02

## 2019-02-07 RX ADMIN — IPRATROPIUM BROMIDE AND ALBUTEROL SULFATE 3 ML: .5; 3 SOLUTION RESPIRATORY (INHALATION) at 11:02

## 2019-02-07 RX ADMIN — METRONIDAZOLE 500 MG: 500 INJECTION, SOLUTION INTRAVENOUS at 05:02

## 2019-02-07 RX ADMIN — Medication 10 ML: at 05:02

## 2019-02-07 RX ADMIN — KETOROLAC TROMETHAMINE 15 MG: 15 INJECTION, SOLUTION INTRAMUSCULAR; INTRAVENOUS at 08:02

## 2019-02-07 RX ADMIN — CIPROFLOXACIN 400 MG: 2 INJECTION, SOLUTION INTRAVENOUS at 09:02

## 2019-02-07 RX ADMIN — SOYBEAN OIL 250 ML: 20 INJECTION, SOLUTION INTRAVENOUS at 09:02

## 2019-02-07 RX ADMIN — HYDROMORPHONE HYDROCHLORIDE 0.5 MG: 1 INJECTION, SOLUTION INTRAMUSCULAR; INTRAVENOUS; SUBCUTANEOUS at 09:02

## 2019-02-07 RX ADMIN — Medication 10 ML: at 12:02

## 2019-02-07 RX ADMIN — Medication 10 ML: at 06:02

## 2019-02-07 RX ADMIN — INSULIN ASPART 1 UNITS: 100 INJECTION, SOLUTION INTRAVENOUS; SUBCUTANEOUS at 10:02

## 2019-02-07 RX ADMIN — PANTOPRAZOLE SODIUM 40 MG: 40 INJECTION, POWDER, LYOPHILIZED, FOR SOLUTION INTRAVENOUS at 08:02

## 2019-02-07 RX ADMIN — INSULIN ASPART 2 UNITS: 100 INJECTION, SOLUTION INTRAVENOUS; SUBCUTANEOUS at 12:02

## 2019-02-07 RX ADMIN — MICONAZOLE NITRATE: 20 OINTMENT TOPICAL at 09:02

## 2019-02-07 RX ADMIN — WHITE PETROLATUM: 1.75 OINTMENT TOPICAL at 08:02

## 2019-02-07 RX ADMIN — INSULIN ASPART 2 UNITS: 100 INJECTION, SOLUTION INTRAVENOUS; SUBCUTANEOUS at 08:02

## 2019-02-07 NOTE — SUBJECTIVE & OBJECTIVE
Interval History/Significant Events: No acute events overnight. Pain controlled with prn pain medication. Continues with 40 mg lasix bid for diuresis; UOP 2L yesterday. Platelet numbers slowly recovering (141 this am on labs).    Follow-up For: Procedure(s) (LRB):  LAPAROTOMY, EXPLORATORY (N/A)  INCISION AND DRAINAGE, ABSCESS-  drainage of pelvic abscess (N/A)  EXCISION, ABSCESS- drainage abdominal wall abscess (N/A)  APPLICATION, WOUND VAC (N/A)    Post-Operative Day: 9 Days Post-Op    Objective:     Vital Signs (Most Recent):  Temp: 98.2 °F (36.8 °C) (02/07/19 0300)  Pulse: 91 (02/07/19 0600)  Resp: (!) 27 (02/07/19 0600)  BP: 135/61 (02/07/19 0600)  SpO2: 100 % (02/07/19 0600) Vital Signs (24h Range):  Temp:  [97.7 °F (36.5 °C)-98.2 °F (36.8 °C)] 98.2 °F (36.8 °C)  Pulse:  [] 91  Resp:  [12-48] 27  SpO2:  [93 %-100 %] 100 %  BP: ()/(42-93) 135/61     Weight: 75.4 kg (166 lb 3.6 oz)  Body mass index is 31.41 kg/m².      Intake/Output Summary (Last 24 hours) at 2/7/2019 0721  Last data filed at 2/7/2019 0600  Gross per 24 hour   Intake 2738.96 ml   Output 2160 ml   Net 578.96 ml       Physical Exam   Constitutional: She appears well-developed and well-nourished. No distress.   HENT:   Head: Normocephalic.   Eyes: Pupils are equal, round, and reactive to light.   Cardiovascular: Regular rate and rhythm.   Pulmonary/Chest: No respiratory distress. She has no wheezes.   Extubated on RA.   Abdominal: Soft. She exhibits no distension.   Neurological:   lethargic   Skin: Skin is warm and dry.       Vents: Not on Ventilator.      Lines/Drains/Airways     Peripherally Inserted Central Catheter Line                 PICC Double Lumen 02/04/19 1757 left brachial 2 days          Central Venous Catheter Line                 Port A Cath Single Lumen 01/23/19 1400 right subclavian 14 days          Drain                 Closed/Suction Drain 01/25/19 1020 Right;Ventral Abdomen Bulb 19 Fr. 12 days         Urethral  Catheter 01/25/19 0934 Straight-tip;Non-latex 16 Fr. 12 days                Significant Labs:    CBC/Anemia Profile:  Recent Labs   Lab 02/06/19  0400 02/06/19 0825 02/07/19 0400   WBC 13.35* 13.16* 11.31   HGB 7.6* 7.8* 7.7*   HCT 23.6* 25.1* 23.8*   * 123* 141*   MCV 90 88 91   RDW 19.2* 19.3* 18.9*        Chemistries:  Recent Labs   Lab 02/06/19 0400 02/06/19 0825 02/07/19 0400   * 136 134*   K 3.6 4.5 4.4    104 104   CO2 25 27 26   BUN 25* 26* 27*   CREATININE 0.5 0.6 0.6   CALCIUM 7.8* 8.5* 8.7   ALBUMIN 1.3* 1.4* 1.4*   PROT 4.6* 4.6* 4.4*   BILITOT 4.0* 4.1* 4.1*   ALKPHOS 786* 829* 779*   ALT 65* 66* 54*   AST 38 38 44*   MG 2.2 2.2 2.2   PHOS 2.7 2.7 3.4         Significant Imaging:  I have reviewed all pertinent imaging results/findings within the past 24 hours.

## 2019-02-07 NOTE — PROGRESS NOTES
[facility]  Critical Care - Surgery  Progress Note    Patient Name: Sheryl Martines  MRN: [id number]  Admission Date: 1/23/2019  Hospital Length of Stay: 14 days  Code Status: Full Code  Attending Provider: Monster Laurent MD  Primary Care Provider: Primary Doctor No   Principal Problem: Pelvic abscess in female    Subjective:     Hospital/ICU Course:  No notes on file    Interval History/Significant Events: No acute events overnight. Pain controlled with prn pain medication. Continues with 40 mg lasix bid for diuresis; UOP 2L yesterday. Platelet numbers slowly recovering (141 this am on labs).    Follow-up For: Procedure(s) (LRB):  LAPAROTOMY, EXPLORATORY (N/A)  INCISION AND DRAINAGE, ABSCESS-  drainage of pelvic abscess (N/A)  EXCISION, ABSCESS- drainage abdominal wall abscess (N/A)  APPLICATION, WOUND VAC (N/A)    Post-Operative Day: 9 Days Post-Op    Objective:     Vital Signs (Most Recent):  Temp: 98.2 °F (36.8 °C) (02/07/19 0300)  Pulse: 91 (02/07/19 0600)  Resp: (!) 27 (02/07/19 0600)  BP: 135/61 (02/07/19 0600)  SpO2: 100 % (02/07/19 0600) Vital Signs (24h Range):  Temp:  [97.7 °F (36.5 °C)-98.2 °F (36.8 °C)] 98.2 °F (36.8 °C)  Pulse:  [] 91  Resp:  [12-48] 27  SpO2:  [93 %-100 %] 100 %  BP: ()/(42-93) 135/61     Weight: 75.4 kg (166 lb 3.6 oz)  Body mass index is 31.41 kg/m².      Intake/Output Summary (Last 24 hours) at 2/7/2019 0721  Last data filed at 2/7/2019 0600  Gross per 24 hour   Intake 2738.96 ml   Output 2160 ml   Net 578.96 ml       Physical Exam   Constitutional: She appears well-developed and well-nourished. No distress.   HENT:   Head: Normocephalic.   Eyes: Pupils are equal, round, and reactive to light.   Cardiovascular: Regular rate and rhythm.   Pulmonary/Chest: No respiratory distress. She has no wheezes.   Extubated on RA.   Abdominal: Soft. She exhibits no distension.   Neurological:   lethargic   Skin: Skin is warm and dry.       Vents: Not on  Ventilator.      Lines/Drains/Airways     Peripherally Inserted Central Catheter Line                 PICC Double Lumen 02/04/19 1757 left brachial 2 days          Central Venous Catheter Line                 Port A Cath Single Lumen 01/23/19 1400 right subclavian 14 days          Drain                 Closed/Suction Drain 01/25/19 1020 Right;Ventral Abdomen Bulb 19 Fr. 12 days         Urethral Catheter 01/25/19 0934 Straight-tip;Non-latex 16 Fr. 12 days                Significant Labs:    CBC/Anemia Profile:  Recent Labs   Lab 02/06/19  0400 02/06/19  0825 02/07/19  0400   WBC 13.35* 13.16* 11.31   HGB 7.6* 7.8* 7.7*   HCT 23.6* 25.1* 23.8*   * 123* 141*   MCV 90 88 91   RDW 19.2* 19.3* 18.9*        Chemistries:  Recent Labs   Lab 02/06/19  0400 02/06/19 0825 02/07/19  0400   * 136 134*   K 3.6 4.5 4.4    104 104   CO2 25 27 26   BUN 25* 26* 27*   CREATININE 0.5 0.6 0.6   CALCIUM 7.8* 8.5* 8.7   ALBUMIN 1.3* 1.4* 1.4*   PROT 4.6* 4.6* 4.4*   BILITOT 4.0* 4.1* 4.1*   ALKPHOS 786* 829* 779*   ALT 65* 66* 54*   AST 38 38 44*   MG 2.2 2.2 2.2   PHOS 2.7 2.7 3.4         Significant Imaging:  I have reviewed all pertinent imaging results/findings within the past 24 hours.    Assessment/Plan:     * Pelvic abscess in female    63 y.o. female with hx of asthma, COPD, CAD, DM, HTN, CVA 2012 with R side deficits, HFpEF, severe debility, lap converted to open appendectomy 8/2018 complicated by cdiff, failure to thrive.  Hx of difficult intubation and delayed emergence, CO2 narcosis needing ICU stay.  Hx of PRN O2 at night. Patient now s/p exploratory laparotomy, washout, and pelvic abscess drainage on 1/25/19.    Post-operatively, on initial evaluation in PACU, patient hypotensive to 60s/40s, on BiPap. Currently fluid resuscitating with 3rd liter of LR, responding well to phenylephrine, starting phenylephrine infusion (tachycardic to ~118). ABG 7.11/49/116/16, BE -14, on bipap 14/5, 35% FiO2. Intubated on  1/26 for airway protection. Extubated 1/28. Required CRRT on 1/26 for metabolic acidosis, now off.     Neuro:  Sedation: None  Pain: multimodal     CV:  - HDS  - Off pressors  - Lactate 3.4 --> 2.3 --> 2.6->2.2->1.8->2.1 (no longer trending)  - 1 unit pRBC 2/1  - 1 unit platelets 1/31  - H/H 7.7/23.8  - PRN hydralazine for HTN    Pulm:   - Extubated 1/28/19  - on RA  - prn CXR  - prn ABG    Renal:  Trend BUN/Cr: 27/0.6  Monitor Urine output (2L over past 24 hours)  Net +514  Nephro following, appreciate assistance  Receiving IV lasix 40 mg bid for diuresis    ID:  AF  1/24 blood cultures: gram positive cocci in clusters resembling staph  1/25 blood cultures: NGTD  1/25 operative cultures pending, ngtd thus far  - on cipro/metro/vanc  -wound vac removed by wound care 1/31; tolerated well    FEN/GI:  TPN @ 50  Replace lytes PRN  Last lactate 2.1; no longer trending  IV Lasix 40 mg bid  Trend Alk phos, LFTs  -Abdominal U/S 2/1: few gallstones, no acute cholecystitis   -Drain output 80cc    Endo:  Patient with IDDM2, on detemir 20u BID at home  1/27-1/28 with hyperglycemia into the 500s on insulin gtt @ 100u/hr  1/28-1/29 with hypoglycemia requiring d10 infusion @ 125cc/hr  SSI with minimal requirements    Heme:  H/H: 7.7/23.8  Plt 134  HIT panel negative  No signs of bleeding at this time  Lovenox    Dispo: Stepdown  Primary: SICU/general surgery            Critical care was time spent personally by me on the following activities: development of treatment plan with patient or surrogate and bedside caregivers, discussions with consultants, evaluation of patient's response to treatment, examination of patient, ordering and performing treatments and interventions, ordering and review of laboratory studies, ordering and review of radiographic studies, pulse oximetry, re-evaluation of patient's condition.  This critical care time did not overlap with that of any other provider or involve time for any procedures.     Hesham  RODOLFO Cortés MD  Critical Care - Surgery  [facility]

## 2019-02-07 NOTE — PROCEDURES
Modified Barium Swallow    Patient Name:  Sheryl Martines   MRN:  [id number]    Recommendations:     Recommendations:                General Recommendations:  Dysphagia therapy and Patient/family training/education   Diet recommendations:  NPO, NPO   Aspiration Precautions: Alternate means of nutrition/hydration, Frequent oral care and Strict aspiration precautions   General Precautions: Standard, aspiration, fall  Communication strategies:  provide increased time to answer and go to room if call light pushed    Referral     Reason for Referral  Patient was referred for a Modified Barium Swallow Study to assess the efficiency of his/her swallow function, rule out aspiration and make recommendations regarding safe dietary consistencies, effective compensatory strategies, and safe eating environment.     Diagnosis: Pelvic abscess in female.  Patient with PMH asthma, COPD, CAD, DM, HTN, CVA with R sided weakness (2010) and s/p ex lap and washout by General Surgery (1/25/19) (extubated 1/29/19) with c/o palatal tenderness following extubation also pertinent to this visit.     History:     Past Medical History:   Diagnosis Date    Abnormal LFTs 12/14/2018    Asthma     Closed compression fracture of fourth lumbar vertebra     COPD (chronic obstructive pulmonary disease)     Coronary artery disease     Diabetes mellitus     Fall 10/4/2018    Fracture     right tib & fib    Glaucoma     High cholesterol     Hypertension     Infected incision 9/20/2018    Intractable nausea and vomiting 1/23/2019    Iritis     Pulmonary embolus     S/P appendectomy 9/11/2018    Stroke     rt sided weakness.       Objective:     Current Respiratory Status: 02/07/19    Alert: yes    Cooperative: yes    Follows Directions: yes    Visualization  · Patient was seen in the lateral view  · Anatomical changes:    · -   [place] huyen (See ENT consult)  · -   Appearance of pharyngeal edema, diffuse    Oral Peripheral  Examination  · Oral Musculature: general weakness  · Dentition: present and adequate  · Secretion Management: adequate  · Mucosal Quality: dry  · Mandibular Strength and Mobility: impaired(reduced strength )  · Oral Labial Strength and Mobility: functional pursing, functional retraction  · Lingual Strength and Mobility: impaired strength  · Volitional Cough: elicited, productive   · Volitional Swallow: elicited, inconsistent timing of initiation   · Voice Prior to PO Intake: clear, adequate intensity, mildly dysarthric   · Oral Musculature Comments: patient with palatine huyen, see ENT consult (1/31/2019) for furhter details    Consistencies Assessed  · Thin 10 mL via teaspoon presentations fed by clinician  · Puree 3 ml via 1/2 tsp presentation fed by clinician     Oral Preparation/Oral Phase  · Prolonged A-P transfer  · Decreased BOT mobility  · Presence of naso-pharyngeal reflux with trials of thin liquids    Pharyngeal Phase   Patient with generalized pharyngeal weakness with decreased laryngeal elevation/excursion patterns, decreased epiglottic inversion, and decreased stripping wave force resulting in premature spillage and aspiration of thin liquids during the swallow and significant residuals in valleculae post swallow.  Use of multiple, effortful swallows only minimally effective in reducing residuals and subsequent penetration of saliva mixed with thin liquid and puree residuals noted during the swallow t/o attempts to clear residuals. Patient with sensation intact and cleared throat to clear aspirated/penetrated material.  Patient also demonstrated  productive cough and orally expelled some of residuals between fluoro. Chin tuck did not eliminate penetration/aspiration risk with thin or puree trials, or reduce level of residuals.  Appearance of diffuse pharyngeal edema noted to impact stripping wave force.  Patient's level of pain noted to impact attention as assessment progressed and impact efficacy of  compensatory strategies.  Findings reviewed with Radiologist and additional trials held 2/2 high likelihood of penetration/aspiration with saliva or subsequent bites/sips mixed with residuals.     Cervical Esophageal Phase  · Decreased UES opening  · Retrograde flow with thin liquids    Assessment:     Impressions  ·  Patient presents with Mild Oral Dysphagia and Moderate-Severe Pharyngeal Dysphagia as characterized by prolonged A-P transfer, decreased BOT mobility, premature spillage with pooling in the valleculae, decreased hyolaryngeal excursion and elevation patterns with aspiration of thin liquids during the swallow and penetration of saliva mixed with thin and pureed residuals after the swallow. Mild-moderate naso pharyngeal reflux noted with thin liquids. Pt with sensation intact and cleared throat to clear aspirated material.  Pt with significant residuals following presentations of thin and puree trials noted to remain in valleculae post swallow, which Patient was unable to adequately clear with use of multiple, effortful swallows, chin tuck or throat clears. Pt with subsequent penetration of saliva mixed with thin and pureed residuals across atttempts to clear.  Patient also demonstrated  productive cough and orally expelled some of residuals between fluoro.  Appearance of diffuse pharyngeal edema noted to impact stripping wave force. Patient's level of pain noted to impact attention as assessment progressed and impact efficacy of compensatory strategies. Findings reviewed with Radiologist and additional trials held 2/2 risk of penetration/aspiration with saliva mixed with residuals and subsequent bites/sips.  Patient not appropriate for PO intake at this time. REC; Continue NPO with alternative means nutrition/hydration/medicaiton and ST to continue to follow for Dysphagia therapy and ongoing Patient/family training and education.     Prognosis: Fair    Barriers:  · Fatigue  · Dependent  feeding    Plan  ST to continue to f/u at the bedside to educate Pt and family on findings./recs and initiate Dysphagia therapy.     Education  Results were discussed with patient. Results were discussed with Medical Team who was in agreement with plan.  Results were reviewed with Patient's nurse.     Goals:   Multidisciplinary Problems     SLP Goals        Problem: SLP Goal    Goal Priority Disciplines Outcome   SLP Goal     SLP Ongoing (interventions implemented as appropriate)   Description:  Speech Language Pathology Goals  Goals expected to be met by 2/13/2019  1. Pt will participate in further, instrumental assessment via MBSS   2. Educate Pt and family on S/S aspiration and aspiration precautions     Goals expected to be met by 2/6/19  1. Pt will participate in ongoing swallow assessment  2. Educate Pt and family on S/S aspiration and aspiration precautions                           Plan:   · Patient to be seen:  Therapy Frequency: 3 x/week   · Plan of Care expires:  03/01/19  · Plan of Care reviewed with:  patient        Discharge recommendations:  nursing facility, skilled     Time Tracking:   SLP Treatment Date:   02/07/19  Speech Start Time:  1656  Speech Stop Time:  1709     Speech Total Time (min):  13 min    RAJWINDER Cobb, [facility]-SLP  Speech-Language Pathology  Pager: [phone]    02/07/2019

## 2019-02-07 NOTE — PT/OT/SLP PROGRESS
Occupational Therapy   Treatment    Name: Sheryl Martines  MRN: [id number]  Admitting Diagnosis:  Pelvic abscess in female s/p exp lap for washout and drainage   13 Days Post-Op; pt with R tib/fib fracture in 10/18    Recommendations:     Discharge Recommendations: nursing facility, skilled  Discharge Equipment Recommendations:  none  Barriers to discharge:  Decreased caregiver support    Assessment:     Sheryl Martines is a 63 y.o. female with a medical diagnosis of Pelvic abscess in female.  She presents with significant debility and deconditioning. Performance deficits affecting function are weakness, impaired self care skills, impaired balance, impaired functional mobilty, impaired endurance, gait instability, decreased lower extremity function, pain, impaired skin.     Rehab Prognosis:  Fair; patient would benefit from acute skilled OT services to address these deficits and reach maximum level of function.       Plan:     Patient to be seen 2 x/week to address the above listed problems via self-care/home management, therapeutic activities, therapeutic exercises  · Plan of Care Expires: 02/28/19  · Plan of Care Reviewed with: patient    Subjective     Pain/Comfort:  · Pain Rating 1: 5/10  · Location - Side 1: Bilateral  · Location - Orientation 1: generalized  · Location 1: (buttocks)  · Pain Addressed 1: Reposition, Distraction  · Pain Rating Post-Intervention 1: 5/10    Objective:     Communicated with: RN prior to session.  Patient found supine with telemetry, LUCIANO drain, nieves catheter, PICC line(SC Port-a-cath) upon OT entry to room.    General Precautions: Standard, fall, aspiration   Orthopedic Precautions:    Braces:       Occupational Performance:     Bed Mobility:    · Patient completed Rolling/Turning to Left with  minimum assistance and with side rail  · Patient completed Rolling/Turning to Right with minimum assistance and with side rail  · Patient completed Scooting/Bridging with maximal  assistance  · Patient completed Supine to Sit with moderate assistance  · Patient completed Sit to Supine with maximal assistance     Functional Mobility/Transfers:  · NT  · Functional Mobility: NT    Activities of Daily Living:  · Grooming: moderate assistance seated EOB  · Lower Body Dressing: total assistance        AMPAC 6 Click ADL: 9    Treatment & Education:  Pt ed on OT POC  Pt sat EOB x 3 minutes with R lateral lean for comfort and CGA for balance  Pt with increased c/o buttocks pain EOB, thus returned supine  Pt rolled multiple times for linen change with minimal A using side rail    Patient left HOB elevated with all lines intact, call button in reach and RN notifiedEducation:      GOALS:   Multidisciplinary Problems     Occupational Therapy Goals        Problem: Occupational Therapy Goal    Goal Priority Disciplines Outcome Interventions   Occupational Therapy Goal     OT, PT/OT Ongoing (interventions implemented as appropriate)    Description:  Goals to be met by: 2/12/19     Patient will increase functional independence with ADLs by performing:    Feeding with Set-up Assistance.  UE Dressing with Set-up Assistance.  LE Dressing with Moderate Assistance.  Grooming while seated with Set-up Assistance.  Toileting from bedside commode with Maximum Assistance for hygiene and clothing management.   Toilet transfer to bedside commode with Maximum Assistance.                      Time Tracking:     OT Date of Treatment: 02/07/19  OT Start Time: 1434  OT Stop Time: 1454  OT Total Time (min): 20 min    Billable Minutes:Therapeutic Activity 20    NAVI Duron  2/7/2019

## 2019-02-07 NOTE — PROGRESS NOTES
"[facility]  General Surgery  Progress Note    Subjective:     History of Present Illness:  64 yo W with a history of HTN, COPD on home oxygen, HFpEF, IDDMII, CVA on plavix, HTN, debility after fall and broken hip, and PE who presents to the ED with a several month history of nausea, vomiting, inability to tolerate a diet and weight loss. She has had multiple admissions in the past few months for different ailments. She presents today with continued nausea, vomiting and abdominal pain that is mostly worse in the RLQ. During the examination, she states her pain was all over her abdomen because she was retching so much. She states that she has lost close to 40lbs since her surgery and that she only able to keep broth down. She had a lap converted to open appendectomy back in August 2018 that was complicated by a wound infection, C diff and failure to thrive. This seems to persists. She is now wheelchair bound (per her) and apparently lives in [place] but is here in [place] with her daughter.    She had a CT scan in the ED which revealed an irregular shaped rim enhancing fluid collection measuring at least 4.0 x 3.0 cm ight hemipelvis at the site of prior appendectomy. Surgery was consulted for further recommendations. She was also noted to have a "knot" at the RLQ incision site that is also tender.    Post-Op Info:  Procedure(s) (LRB):  LAPAROTOMY, EXPLORATORY (N/A)  INCISION AND DRAINAGE, ABSCESS-  drainage of pelvic abscess (N/A)  EXCISION, ABSCESS- drainage abdominal wall abscess (N/A)  APPLICATION, WOUND VAC (N/A)   13 Days Post-Op     Interval History:   Good UOP 2.1L. NAEON. AFVSS. +BM. Plan for MBSS today.       Medications:  Continuous Infusions:   TPN ADULT CENTRAL LINE CUSTOM 50 mL/hr at 02/07/19 1300    TPN ADULT CENTRAL LINE CUSTOM       Scheduled Meds:   ciprofloxacin  400 mg Intravenous Q12H    enoxaparin  40 mg Subcutaneous Daily    fat emulsion 20%  250 mL Intravenous Daily "    fluticasone-vilanterol  1 puff Inhalation Daily    furosemide  40 mg Intravenous BID    metronidazole  500 mg Intravenous Q8H    miconazole nitrate 2%   Topical (Top) BID    pantoprozole (PROTONIX) IV  40 mg Intravenous Q12H    sodium chloride 0.9%  10 mL Intravenous Q6H    vancomycin (VANCOCIN) IVPB  1,000 mg Intravenous Q24H     PRN Meds:albuterol sulfate, albuterol-ipratropium, dextrose 50%, diclofenac sodium, diphenhydrAMINE, glucagon (human recombinant), hydrALAZINE, HYDROmorphone, insulin aspart U-100, ketorolac, ondansetron, promethazine (PHENERGAN) IVPB, Flushing PICC Protocol **AND** sodium chloride 0.9% **AND** sodium chloride 0.9%, sodium chloride 0.9%, sodium chloride 0.9%, white petrolatum     Review of patient's allergies indicates:   Allergen Reactions    Ace inhibitors Swelling    Carvedilol      Other reaction(s): Other (See Comments)  blister    Hydralazine analogues     Metformin Nausea And Vomiting    Tetracycline Itching    Tetracyclines Swelling    Travatan (with benzalkonium) [travoprost (benzalkonium)]     Travoprost Itching     Other reaction(s): Other (See Comments)  Blurry vision     Objective:     Vital Signs (Most Recent):  Temp: 98.3 °F (36.8 °C) (02/07/19 1100)  Pulse: 98 (02/07/19 1300)  Resp: 12 (02/07/19 1300)  BP: 130/68 (02/07/19 1300)  SpO2: 98 % (02/07/19 1300) Vital Signs (24h Range):  Temp:  [98 °F (36.7 °C)-98.3 °F (36.8 °C)] 98.3 °F (36.8 °C)  Pulse:  [] 98  Resp:  [12-48] 12  SpO2:  [93 %-100 %] 98 %  BP: ()/(42-98) 130/68     Weight: 75.4 kg (166 lb 3.6 oz)  Body mass index is 31.41 kg/m².    Intake/Output - Last 3 Shifts       02/05 0700 - 02/06 0659 02/06 0700 - 02/07 0659 02/07 0700 - 02/08 0659    I.V. (mL/kg) 1176 (16.5) 1140.2 (15.1)     IV Piggyback       TPN 1675.4 1598.8     Total Intake(mL/kg) 2851.4 (40) 2739 (36.3)     Urine (mL/kg/hr) 3705 (2.2) 2145 (1.2) 905 (1.8)    Drains 15 80 12    Stool   0    Total Output 3720 2221 917     Net -868.6 +514 -917           Stool Occurrence   1 x          Physical Exam   Constitutional: She appears well-developed. No distress.   HENT:   Head: Normocephalic and atraumatic.   Pulmonary/Chest: Effort normal. No stridor. No respiratory distress.   Coarse breath sounds bilaterally   Abdominal: Soft.       Open wounds covered with adequate dressing. No signs of infection. LUCIANO drain with serous output.   Musculoskeletal: She exhibits no edema.   Neurological: She is alert.   Skin: Skin is warm and dry.   Psychiatric: She has a normal mood and affect.       Significant Labs:  CBC:   Recent Labs   Lab 02/07/19  0400   WBC 11.31   RBC 2.62*   HGB 7.7*   HCT 23.8*   *   MCV 91   MCH 29.4   MCHC 32.4     CMP:   Recent Labs   Lab 02/07/19  0400   *   CALCIUM 8.7   ALBUMIN 1.4*   PROT 4.4*   *   K 4.4   CO2 26      BUN 27*   CREATININE 0.6   ALKPHOS 779*   ALT 54*   AST 44*   BILITOT 4.1*       Significant Diagnostics:  None    Assessment/Plan:     * Pelvic abscess in female    -S/p ex-lap with drainage of abscess. On antibiotics.     Multiple skin tears    -Wound care following     Depression    -Hold home meds for now until tolerating po     Hypophosphatemia    -Replace as needed     Debility    -PT/OT; history of fall with non displaced oblique right tibia fracture at metaphysis into diaphysis. Was wearing brace.  -Severely debilited       Generalized abdominal pain    62 yo female presenting with abdominal pain, nausea, elevated lactate s/p open drainage pelvic abscess 1/25     -Continue broad spec abx, f/u cultures- abdominal with bacteroides, cont cipro/flagyl plan for 4 week course  blood cx from admit MRSA x2 plan for 2 week course vanc, repeat BCx NGTD  -WOCN following, appreciate help with wounds/blisters, blisters appear to be 2/2 severe anasarca  -stable for SD     Moderate malnutrition    -Continue TPN  -MBSS today, ok for diet if passes     Essential hypertension    -Home meds      Gastroesophageal reflux disease without esophagitis    -Continue BID IV protonix     (HFpEF) heart failure with preserved ejection fraction    Last echo 8/2018 with no WMAs, grade 1DD, EF 60%  -Strict I/O, daily weights  -Continue diuresis     Moderate asthma without complication    -Continue with scheduled duonebs     Elevated troponin    -Cardiology consulted. On ASA/plavix at home  -Mild elevation; rectal aspirin to start today     CAD (coronary artery disease)    -restart ASA     Type 2 diabetes mellitus    -SSI         Sierra Real MD  General Surgery  [facility]

## 2019-02-07 NOTE — PLAN OF CARE
"Problem: Adult Inpatient Plan of Care  Goal: Plan of Care Review  Outcome: Ongoing (interventions implemented as appropriate)  Dx: Ex lap    Shift Events: VSS; pt rested comfortably on 2L NC with sats remaining above 95%. Transfer orders in.     Neuro: AAO x4, Arouses to voice, Moves all extremities and Follows commands    Vital Signs: BP (!) 157/65 (BP Location: Right arm, Patient Position: Lying)   Pulse 91   Temp 98.2 °F (36.8 °C) (Oral)   Resp (!) 22   Ht 5' 1" (1.549 m)   Wt 75.4 kg (166 lb 3.6 oz)   LMP  (LMP Unknown)   SpO2 100%   Breastfeeding? No   BMI 31.41 kg/m²     Intake/Gtts/Diet: NPO; TPN/lipid gtts    Output: Voids via urinary catheter with adequate UOP    Pain Management: Q2 dilaudid with minimal relief obtained     Labs: Q4 accuchecks; coverage needed.    Skin: CDI; heels, sacrum and elbows without breakdown. Blisters under skin fold kept dry and clean. Wound care to change dressing beneath exudry.         "

## 2019-02-07 NOTE — NURSING TRANSFER
Nursing Transfer Note      2/7/2019     Transfer To: 1007    Transfer via bed    Transfer with cardiac monitoring    Transported by ICU RN    Medicines sent: insulin, inhaler    Chart send with patient: Yes    Notified: daughter    Patient reassessed at: 2/7/19, 1330 (date, time)    Upon arrival to floor: cardiac monitor applied, patient oriented to room, call bell in reach and bed in lowest position

## 2019-02-07 NOTE — ASSESSMENT & PLAN NOTE
62 yo female presenting with abdominal pain, nausea, elevated lactate s/p open drainage pelvic abscess 1/25     -Continue broad spec abx, f/u cultures- abdominal with bacteroides, cont cipro/flagyl plan for 4 week course  blood cx from admit MRSA x2 plan for 2 week course vanc, repeat BCx NGTD  -WOCN following, appreciate help with wounds/blisters, blisters appear to be 2/2 severe anasarca  -stable for SD

## 2019-02-07 NOTE — SUBJECTIVE & OBJECTIVE
Interval History:   Good UOP 2.1L. NAEON. AFVSS. +BM. Plan for MBSS today.       Medications:  Continuous Infusions:   TPN ADULT CENTRAL LINE CUSTOM 50 mL/hr at 02/07/19 1300    TPN ADULT CENTRAL LINE CUSTOM       Scheduled Meds:   ciprofloxacin  400 mg Intravenous Q12H    enoxaparin  40 mg Subcutaneous Daily    fat emulsion 20%  250 mL Intravenous Daily    fluticasone-vilanterol  1 puff Inhalation Daily    furosemide  40 mg Intravenous BID    metronidazole  500 mg Intravenous Q8H    miconazole nitrate 2%   Topical (Top) BID    pantoprozole (PROTONIX) IV  40 mg Intravenous Q12H    sodium chloride 0.9%  10 mL Intravenous Q6H    vancomycin (VANCOCIN) IVPB  1,000 mg Intravenous Q24H     PRN Meds:albuterol sulfate, albuterol-ipratropium, dextrose 50%, diclofenac sodium, diphenhydrAMINE, glucagon (human recombinant), hydrALAZINE, HYDROmorphone, insulin aspart U-100, ketorolac, ondansetron, promethazine (PHENERGAN) IVPB, Flushing PICC Protocol **AND** sodium chloride 0.9% **AND** sodium chloride 0.9%, sodium chloride 0.9%, sodium chloride 0.9%, white petrolatum     Review of patient's allergies indicates:   Allergen Reactions    Ace inhibitors Swelling    Carvedilol      Other reaction(s): Other (See Comments)  blister    Hydralazine analogues     Metformin Nausea And Vomiting    Tetracycline Itching    Tetracyclines Swelling    Travatan (with benzalkonium) [travoprost (benzalkonium)]     Travoprost Itching     Other reaction(s): Other (See Comments)  Blurry vision     Objective:     Vital Signs (Most Recent):  Temp: 98.3 °F (36.8 °C) (02/07/19 1100)  Pulse: 98 (02/07/19 1300)  Resp: 12 (02/07/19 1300)  BP: 130/68 (02/07/19 1300)  SpO2: 98 % (02/07/19 1300) Vital Signs (24h Range):  Temp:  [98 °F (36.7 °C)-98.3 °F (36.8 °C)] 98.3 °F (36.8 °C)  Pulse:  [] 98  Resp:  [12-48] 12  SpO2:  [93 %-100 %] 98 %  BP: ()/(42-98) 130/68     Weight: 75.4 kg (166 lb 3.6 oz)  Body mass index is 31.41  kg/m².    Intake/Output - Last 3 Shifts       02/05 0700 - 02/06 0659 02/06 0700 - 02/07 0659 02/07 0700 - 02/08 0659    I.V. (mL/kg) 1176 (16.5) 1140.2 (15.1)     IV Piggyback       TPN 1675.4 1598.8     Total Intake(mL/kg) 2851.4 (40) 2739 (36.3)     Urine (mL/kg/hr) 3705 (2.2) 2145 (1.2) 905 (1.8)    Drains 15 80 12    Stool   0    Total Output 3720 2225 917    Net -868.6 +514 -917           Stool Occurrence   1 x          Physical Exam   Constitutional: She appears well-developed. No distress.   HENT:   Head: Normocephalic and atraumatic.   Pulmonary/Chest: Effort normal. No stridor. No respiratory distress.   Coarse breath sounds bilaterally   Abdominal: Soft.       Open wounds covered with adequate dressing. No signs of infection. LUCIANO drain with serous output.   Musculoskeletal: She exhibits no edema.   Neurological: She is alert.   Skin: Skin is warm and dry.   Psychiatric: She has a normal mood and affect.       Significant Labs:  CBC:   Recent Labs   Lab 02/07/19  0400   WBC 11.31   RBC 2.62*   HGB 7.7*   HCT 23.8*   *   MCV 91   MCH 29.4   MCHC 32.4     CMP:   Recent Labs   Lab 02/07/19  0400   *   CALCIUM 8.7   ALBUMIN 1.4*   PROT 4.4*   *   K 4.4   CO2 26      BUN 27*   CREATININE 0.6   ALKPHOS 779*   ALT 54*   AST 44*   BILITOT 4.1*       Significant Diagnostics:  None

## 2019-02-07 NOTE — PLAN OF CARE
Problem: Occupational Therapy Goal  Goal: Occupational Therapy Goal  Goals to be met by: 2/12/19     Patient will increase functional independence with ADLs by performing:    Feeding with Set-up Assistance.  UE Dressing with Set-up Assistance.  LE Dressing with Moderate Assistance.  Grooming while seated with Set-up Assistance.  Toileting from bedside commode with Maximum Assistance for hygiene and clothing management.   Toilet transfer to bedside commode with Maximum Assistance.     Outcome: Ongoing (interventions implemented as appropriate)  The above goals remain appropriate. NAVI Duron  2/7/2019      Comments: The above goals remain appropriate. NAVI Duron  2/7/2019

## 2019-02-07 NOTE — PLAN OF CARE
Problem: SLP Goal  Goal: SLP Goal  Speech Language Pathology Goals  Goals expected to be met by 2/13/2019  1. Pt will participate in further, instrumental assessment via MBSS   2. Educate Pt and family on S/S aspiration and aspiration precautions     Goals expected to be met by 2/6/19  1. Pt will participate in ongoing swallow assessment  2. Educate Pt and family on S/S aspiration and aspiration precautions          Outcome: Ongoing (interventions implemented as appropriate)  MBSS completed. Patient presents with Mild Oral Dysphagia and Moderate-Severe Pharyngeal Dysphagia as characterized by prolonged A-P transfer, premature spillage, decreased hyolaryngeal excursion and elevation patterns with premature spillage and aspiration of thin liquids during the swallow and penetration of saliva mixed with pureed residuals after the swallow. Mild-moderate naso-pharyngeal reflux with thin liquids. Pt with sensation intact and cleared throat to clear aspirated material.   Pt with significant residuals following presentations of thin and puree trials noted to remain in valleculae, which Pt was unable to adequately clear with use of multiple, effortful swallows, chin tuck or throat clears. Pt with subsequent penetration of saliva mixed with pureed residuals across atttempts to clear. Pt with productive cough and orally expelled some of residuals between fleuro. Findings reviewed with Radiologist and additional trials held 2/2 risk of penetration/aspiraiton with saliva mixed with residuals and subsequent bites/sips.  Patient not appropriate for PO intake at this time. REC; Continue NPO with alternative means nutrition/hydration/medicaiton and ST to continue to follow for Dysphagia therapy. Findings/recs reviewed with Pt, Nurse and MD team. Full procedure report to follow.    NAVID Cobb., [facility]-SLP  Speech-Language Pathology  Pager: [phone]  2/7/2019

## 2019-02-08 ENCOUNTER — ANESTHESIA EVENT (OUTPATIENT)
Dept: SURGERY | Facility: HOSPITAL | Age: 64
End: 2019-02-08

## 2019-02-08 LAB
ALBUMIN SERPL BCP-MCNC: 1.4 G/DL
ALP SERPL-CCNC: 721 U/L
ALT SERPL W/O P-5'-P-CCNC: 40 U/L
ANION GAP SERPL CALC-SCNC: 5 MMOL/L
AST SERPL-CCNC: 39 U/L
BASOPHILS # BLD AUTO: 0.02 K/UL
BASOPHILS NFR BLD: 0.2 %
BILIRUB SERPL-MCNC: 3.7 MG/DL
BUN SERPL-MCNC: 30 MG/DL
CA-I BLDV-SCNC: 1.16 MMOL/L
CALCIUM SERPL-MCNC: 8.6 MG/DL
CHLORIDE SERPL-SCNC: 102 MMOL/L
CO2 SERPL-SCNC: 25 MMOL/L
CREAT SERPL-MCNC: 0.6 MG/DL
DIFFERENTIAL METHOD: ABNORMAL
EOSINOPHIL # BLD AUTO: 0.7 K/UL
EOSINOPHIL NFR BLD: 6.7 %
ERYTHROCYTE [DISTWIDTH] IN BLOOD BY AUTOMATED COUNT: 18.7 %
EST. GFR  (AFRICAN AMERICAN): >60 ML/MIN/1.73 M^2
EST. GFR  (NON AFRICAN AMERICAN): >60 ML/MIN/1.73 M^2
GLUCOSE SERPL-MCNC: 150 MG/DL
HCT VFR BLD AUTO: 23.2 %
HGB BLD-MCNC: 7.6 G/DL
IMM GRANULOCYTES # BLD AUTO: 0.05 K/UL
IMM GRANULOCYTES NFR BLD AUTO: 0.5 %
LYMPHOCYTES # BLD AUTO: 2 K/UL
LYMPHOCYTES NFR BLD: 20 %
MAGNESIUM SERPL-MCNC: 2.2 MG/DL
MCH RBC QN AUTO: 29.8 PG
MCHC RBC AUTO-ENTMCNC: 32.8 G/DL
MCV RBC AUTO: 91 FL
MONOCYTES # BLD AUTO: 1 K/UL
MONOCYTES NFR BLD: 9.9 %
NEUTROPHILS # BLD AUTO: 6.1 K/UL
NEUTROPHILS NFR BLD: 62.7 %
NRBC BLD-RTO: 0 /100 WBC
PHOSPHATE SERPL-MCNC: 4.1 MG/DL
PLATELET # BLD AUTO: 142 K/UL
PMV BLD AUTO: 12.2 FL
POCT GLUCOSE: 180 MG/DL (ref 70–110)
POCT GLUCOSE: 269 MG/DL (ref 70–110)
POTASSIUM SERPL-SCNC: 4.2 MMOL/L
PROT SERPL-MCNC: 4.6 G/DL
RBC # BLD AUTO: 2.55 M/UL
SODIUM SERPL-SCNC: 132 MMOL/L
VANCOMYCIN TROUGH SERPL-MCNC: 15.3 UG/ML
WBC # BLD AUTO: 9.8 K/UL

## 2019-02-08 PROCEDURE — 63600175 PHARM REV CODE 636 W HCPCS: Performed by: STUDENT IN AN ORGANIZED HEALTH CARE EDUCATION/TRAINING PROGRAM

## 2019-02-08 PROCEDURE — 25000242 PHARM REV CODE 250 ALT 637 W/ HCPCS: Performed by: STUDENT IN AN ORGANIZED HEALTH CARE EDUCATION/TRAINING PROGRAM

## 2019-02-08 PROCEDURE — 20600001 HC STEP DOWN PRIVATE ROOM

## 2019-02-08 PROCEDURE — C9113 INJ PANTOPRAZOLE SODIUM, VIA: HCPCS | Performed by: PHYSICIAN ASSISTANT

## 2019-02-08 PROCEDURE — 80053 COMPREHEN METABOLIC PANEL: CPT

## 2019-02-08 PROCEDURE — A4216 STERILE WATER/SALINE, 10 ML: HCPCS | Performed by: SURGERY

## 2019-02-08 PROCEDURE — B4185 PARENTERAL SOL 10 GM LIPIDS: HCPCS | Performed by: SURGERY

## 2019-02-08 PROCEDURE — 84100 ASSAY OF PHOSPHORUS: CPT

## 2019-02-08 PROCEDURE — 80202 ASSAY OF VANCOMYCIN: CPT

## 2019-02-08 PROCEDURE — 25000003 PHARM REV CODE 250: Performed by: STUDENT IN AN ORGANIZED HEALTH CARE EDUCATION/TRAINING PROGRAM

## 2019-02-08 PROCEDURE — 27000221 HC OXYGEN, UP TO 24 HOURS

## 2019-02-08 PROCEDURE — S0030 INJECTION, METRONIDAZOLE: HCPCS | Performed by: SURGERY

## 2019-02-08 PROCEDURE — A4217 STERILE WATER/SALINE, 500 ML: HCPCS | Performed by: SURGERY

## 2019-02-08 PROCEDURE — 94640 AIRWAY INHALATION TREATMENT: CPT

## 2019-02-08 PROCEDURE — 63600175 PHARM REV CODE 636 W HCPCS: Performed by: SURGERY

## 2019-02-08 PROCEDURE — 94761 N-INVAS EAR/PLS OXIMETRY MLT: CPT

## 2019-02-08 PROCEDURE — 94664 DEMO&/EVAL PT USE INHALER: CPT

## 2019-02-08 PROCEDURE — 85025 COMPLETE CBC W/AUTO DIFF WBC: CPT

## 2019-02-08 PROCEDURE — 25000003 PHARM REV CODE 250: Performed by: SURGERY

## 2019-02-08 PROCEDURE — 99900035 HC TECH TIME PER 15 MIN (STAT)

## 2019-02-08 PROCEDURE — 82330 ASSAY OF CALCIUM: CPT

## 2019-02-08 PROCEDURE — 83735 ASSAY OF MAGNESIUM: CPT

## 2019-02-08 PROCEDURE — 63600175 PHARM REV CODE 636 W HCPCS: Performed by: PHYSICIAN ASSISTANT

## 2019-02-08 RX ADMIN — INSULIN ASPART 6 UNITS: 100 INJECTION, SOLUTION INTRAVENOUS; SUBCUTANEOUS at 04:02

## 2019-02-08 RX ADMIN — KETOROLAC TROMETHAMINE 15 MG: 15 INJECTION, SOLUTION INTRAMUSCULAR; INTRAVENOUS at 06:02

## 2019-02-08 RX ADMIN — SOYBEAN OIL 250 ML: 20 INJECTION, SOLUTION INTRAVENOUS at 10:02

## 2019-02-08 RX ADMIN — FLUTICASONE FUROATE AND VILANTEROL TRIFENATATE 1 PUFF: 100; 25 POWDER RESPIRATORY (INHALATION) at 09:02

## 2019-02-08 RX ADMIN — PANTOPRAZOLE SODIUM 40 MG: 40 INJECTION, POWDER, LYOPHILIZED, FOR SOLUTION INTRAVENOUS at 09:02

## 2019-02-08 RX ADMIN — POTASSIUM CHLORIDE: 2 INJECTION, SOLUTION, CONCENTRATE INTRAVENOUS at 10:02

## 2019-02-08 RX ADMIN — METRONIDAZOLE 500 MG: 500 INJECTION, SOLUTION INTRAVENOUS at 01:02

## 2019-02-08 RX ADMIN — HYDROMORPHONE HYDROCHLORIDE 0.5 MG: 1 INJECTION, SOLUTION INTRAMUSCULAR; INTRAVENOUS; SUBCUTANEOUS at 02:02

## 2019-02-08 RX ADMIN — Medication 10 ML: at 06:02

## 2019-02-08 RX ADMIN — Medication 10 ML: at 01:02

## 2019-02-08 RX ADMIN — IPRATROPIUM BROMIDE AND ALBUTEROL SULFATE 3 ML: .5; 3 SOLUTION RESPIRATORY (INHALATION) at 12:02

## 2019-02-08 RX ADMIN — METRONIDAZOLE 500 MG: 500 INJECTION, SOLUTION INTRAVENOUS at 09:02

## 2019-02-08 RX ADMIN — METRONIDAZOLE 500 MG: 500 INJECTION, SOLUTION INTRAVENOUS at 06:02

## 2019-02-08 RX ADMIN — INSULIN ASPART 1 UNITS: 100 INJECTION, SOLUTION INTRAVENOUS; SUBCUTANEOUS at 10:02

## 2019-02-08 RX ADMIN — IPRATROPIUM BROMIDE AND ALBUTEROL SULFATE 3 ML: .5; 3 SOLUTION RESPIRATORY (INHALATION) at 07:02

## 2019-02-08 RX ADMIN — ENOXAPARIN SODIUM 40 MG: 100 INJECTION SUBCUTANEOUS at 04:02

## 2019-02-08 RX ADMIN — FUROSEMIDE 40 MG: 10 INJECTION, SOLUTION INTRAVENOUS at 09:02

## 2019-02-08 RX ADMIN — KETOROLAC TROMETHAMINE 15 MG: 15 INJECTION, SOLUTION INTRAMUSCULAR; INTRAVENOUS at 01:02

## 2019-02-08 RX ADMIN — Medication 10 ML: at 12:02

## 2019-02-08 RX ADMIN — HYDROMORPHONE HYDROCHLORIDE 0.5 MG: 1 INJECTION, SOLUTION INTRAMUSCULAR; INTRAVENOUS; SUBCUTANEOUS at 04:02

## 2019-02-08 RX ADMIN — MICONAZOLE NITRATE: 20 OINTMENT TOPICAL at 10:02

## 2019-02-08 RX ADMIN — INSULIN ASPART 2 UNITS: 100 INJECTION, SOLUTION INTRAVENOUS; SUBCUTANEOUS at 06:02

## 2019-02-08 RX ADMIN — CIPROFLOXACIN 400 MG: 2 INJECTION, SOLUTION INTRAVENOUS at 09:02

## 2019-02-08 RX ADMIN — CIPROFLOXACIN 400 MG: 2 INJECTION, SOLUTION INTRAVENOUS at 10:02

## 2019-02-08 RX ADMIN — HYDROMORPHONE HYDROCHLORIDE 0.5 MG: 1 INJECTION, SOLUTION INTRAMUSCULAR; INTRAVENOUS; SUBCUTANEOUS at 08:02

## 2019-02-08 RX ADMIN — VANCOMYCIN HYDROCHLORIDE 1000 MG: 1 INJECTION, POWDER, FOR SOLUTION INTRAVENOUS at 05:02

## 2019-02-08 RX ADMIN — PANTOPRAZOLE SODIUM 40 MG: 40 INJECTION, POWDER, LYOPHILIZED, FOR SOLUTION INTRAVENOUS at 08:02

## 2019-02-08 RX ADMIN — HYDROMORPHONE HYDROCHLORIDE 0.5 MG: 1 INJECTION, SOLUTION INTRAMUSCULAR; INTRAVENOUS; SUBCUTANEOUS at 10:02

## 2019-02-08 RX ADMIN — HYDROMORPHONE HYDROCHLORIDE 0.5 MG: 1 INJECTION, SOLUTION INTRAMUSCULAR; INTRAVENOUS; SUBCUTANEOUS at 09:02

## 2019-02-08 RX ADMIN — KETOROLAC TROMETHAMINE 15 MG: 15 INJECTION, SOLUTION INTRAMUSCULAR; INTRAVENOUS at 12:02

## 2019-02-08 NOTE — ASSESSMENT & PLAN NOTE
62 yo female presenting with abdominal pain, nausea, elevated lactate s/p open drainage pelvic abscess 1/25     -NPO  -TPN  -pain/nasuea meds PRN  -Continue broad spec abx - IV vanc, cipro, flagyl - estimated end date 2/26/19  -WOCN following, appreciate help  -DVT/GI prophylaxis  -PT/OT

## 2019-02-08 NOTE — SUBJECTIVE & OBJECTIVE
Interval History:  Patient seen and examined, no acute events overnight  Failed MBSS  Abdominal pain well controlled  +F/BM  LUCIANO drain put out 25mL  Afebrile/VSS    Medications:  Continuous Infusions:   TPN ADULT CENTRAL LINE CUSTOM 50 mL/hr at 02/07/19 2200     Scheduled Meds:   aspirin  81 mg Oral Daily    ciprofloxacin  400 mg Intravenous Q12H    diltiaZEM  180 mg Oral Daily    enoxaparin  40 mg Subcutaneous Daily    fat emulsion 20%  250 mL Intravenous Daily    fluticasone-vilanterol  1 puff Inhalation Daily    furosemide  40 mg Intravenous BID    metronidazole  500 mg Intravenous Q8H    miconazole nitrate 2%   Topical (Top) BID    pantoprozole (PROTONIX) IV  40 mg Intravenous Q12H    sodium chloride 0.9%  10 mL Intravenous Q6H    vancomycin (VANCOCIN) IVPB  1,000 mg Intravenous Q24H     PRN Meds:albuterol sulfate, albuterol-ipratropium, dextrose 50%, diclofenac sodium, diphenhydrAMINE, glucagon (human recombinant), hydrALAZINE, HYDROmorphone, insulin aspart U-100, ketorolac, ondansetron, promethazine (PHENERGAN) IVPB, Flushing PICC Protocol **AND** sodium chloride 0.9% **AND** sodium chloride 0.9%, sodium chloride 0.9%, sodium chloride 0.9%, white petrolatum     Review of patient's allergies indicates:   Allergen Reactions    Ace inhibitors Swelling    Carvedilol      Other reaction(s): Other (See Comments)  blister    Hydralazine analogues     Metformin Nausea And Vomiting    Tetracycline Itching    Tetracyclines Swelling    Travatan (with benzalkonium) [travoprost (benzalkonium)]     Travoprost Itching     Other reaction(s): Other (See Comments)  Blurry vision     Objective:     Vital Signs (Most Recent):  Temp: 96.7 °F (35.9 °C) (02/08/19 0326)  Pulse: 93 (02/08/19 0326)  Resp: 18 (02/08/19 0326)  BP: (!) 158/79 (02/08/19 0326)  SpO2: 100 % (02/08/19 0326) Vital Signs (24h Range):  Temp:  [96.7 °F (35.9 °C)-98.8 °F (37.1 °C)] 96.7 °F (35.9 °C)  Pulse:  [65-98] 93  Resp:  [12-19] 18  SpO2:   [92 %-100 %] 100 %  BP: ()/(44-98) 158/79     Weight: 75.4 kg (166 lb 3.6 oz)  Body mass index is 31.41 kg/m².    Intake/Output - Last 3 Shifts       02/06 0700 - 02/07 0659 02/07 0700 - 02/08 0659 02/08 0700 - 02/09 0659    I.V. (mL/kg) 1140.2 (15.1)      IV Piggyback  1550     TPN 1598.8 631.7     Total Intake(mL/kg) 2739 (36.3) 2181.7 (28.9)     Urine (mL/kg/hr) 2145 (1.2) 2405 (1.3) 1500 (17.5)    Drains 80 37     Stool  0     Total Output 2225 2442 1500    Net +514 -260.3 -1500           Stool Occurrence  1 x           Physical Exam   Constitutional: She appears well-developed and well-nourished. No distress.   HENT:   Head: Normocephalic and atraumatic.   Cardiovascular: Normal rate and regular rhythm.   Pulmonary/Chest: Effort normal. No respiratory distress.   Abdominal:   Soft, appropriate TTP  Open wounds covered with adequate dressing. No signs of infection. LUCIANO drain with serous output.        Significant Labs:  CBC:   Recent Labs   Lab 02/08/19 0529   WBC 9.80   RBC 2.55*   HGB 7.6*   HCT 23.2*   *   MCV 91   MCH 29.8   MCHC 32.8     BMP:   Recent Labs   Lab 02/08/19  0529   *   *   K 4.2      CO2 25   BUN 30*   CREATININE 0.6   CALCIUM 8.6*   MG 2.2     CMP:   Recent Labs   Lab 02/08/19  0529   *   CALCIUM 8.6*   ALBUMIN 1.4*   PROT 4.6*   *   K 4.2   CO2 25      BUN 30*   CREATININE 0.6   ALKPHOS 721*   ALT 40   AST 39   BILITOT 3.7*     LFTs:   Recent Labs   Lab 02/08/19  0529   ALT 40   AST 39   ALKPHOS 721*   BILITOT 3.7*   PROT 4.6*   ALBUMIN 1.4*

## 2019-02-08 NOTE — PT/OT/SLP PROGRESS
Speech Language Pathology    Sheryl Martines  MRN: [id number]    SLP attempted to see Patient and family for ongoing education/training following MBS prior service day. Upon SLP attempt, no family present at bedside and nurse attending to cleaning/repositioning Patient.  SLP unable to make second attempt later service day. Patient not seen today secondary to Unavailable (Nurse care). Will follow-up per POC.    NAVID Cobb., [facility]-SLP  Speech-Language Pathology  Pager: [phone]  2/8/2019

## 2019-02-08 NOTE — CARE UPDATE
BG goal 140-180     Patient has a history of DM  Remains NPO on TPN  Plan for PEG on Monday  Minimal correction scale insulin requirements overnight  Continue moderate dose correction scale with BG monitoring q 4 hours     Endocrine to continue to follow     ** Please call Endocrine for any BG related issues **

## 2019-02-08 NOTE — PROGRESS NOTES
"[facility]  General Surgery  Progress Note    Subjective:     History of Present Illness:  64 yo W with a history of HTN, COPD on home oxygen, HFpEF, IDDMII, CVA on plavix, HTN, debility after fall and broken hip, and PE who presents to the ED with a several month history of nausea, vomiting, inability to tolerate a diet and weight loss. She has had multiple admissions in the past few months for different ailments. She presents today with continued nausea, vomiting and abdominal pain that is mostly worse in the RLQ. During the examination, she states her pain was all over her abdomen because she was retching so much. She states that she has lost close to 40lbs since her surgery and that she only able to keep broth down. She had a lap converted to open appendectomy back in August 2018 that was complicated by a wound infection, C diff and failure to thrive. This seems to persists. She is now wheelchair bound (per her) and apparently lives in [place] but is here in [place] with her daughter.    She had a CT scan in the ED which revealed an irregular shaped rim enhancing fluid collection measuring at least 4.0 x 3.0 cm ight hemipelvis at the site of prior appendectomy. Surgery was consulted for further recommendations. She was also noted to have a "knot" at the RLQ incision site that is also tender.    Post-Op Info:  Procedure(s) (LRB):  LAPAROTOMY, EXPLORATORY (N/A)  INCISION AND DRAINAGE, ABSCESS-  drainage of pelvic abscess (N/A)  EXCISION, ABSCESS- drainage abdominal wall abscess (N/A)  APPLICATION, WOUND VAC (N/A)   14 Days Post-Op     Interval History:  Patient seen and examined, no acute events overnight  Failed MBSS  Abdominal pain well controlled  +F/BM  LUCIANO drain put out 25mL  Afebrile/VSS    Medications:  Continuous Infusions:   TPN ADULT CENTRAL LINE CUSTOM 50 mL/hr at 02/07/19 2200     Scheduled Meds:   aspirin  81 mg Oral Daily    ciprofloxacin  400 mg Intravenous Q12H    diltiaZEM "  180 mg Oral Daily    enoxaparin  40 mg Subcutaneous Daily    fat emulsion 20%  250 mL Intravenous Daily    fluticasone-vilanterol  1 puff Inhalation Daily    furosemide  40 mg Intravenous BID    metronidazole  500 mg Intravenous Q8H    miconazole nitrate 2%   Topical (Top) BID    pantoprozole (PROTONIX) IV  40 mg Intravenous Q12H    sodium chloride 0.9%  10 mL Intravenous Q6H    vancomycin (VANCOCIN) IVPB  1,000 mg Intravenous Q24H     PRN Meds:albuterol sulfate, albuterol-ipratropium, dextrose 50%, diclofenac sodium, diphenhydrAMINE, glucagon (human recombinant), hydrALAZINE, HYDROmorphone, insulin aspart U-100, ketorolac, ondansetron, promethazine (PHENERGAN) IVPB, Flushing PICC Protocol **AND** sodium chloride 0.9% **AND** sodium chloride 0.9%, sodium chloride 0.9%, sodium chloride 0.9%, white petrolatum     Review of patient's allergies indicates:   Allergen Reactions    Ace inhibitors Swelling    Carvedilol      Other reaction(s): Other (See Comments)  blister    Hydralazine analogues     Metformin Nausea And Vomiting    Tetracycline Itching    Tetracyclines Swelling    Travatan (with benzalkonium) [travoprost (benzalkonium)]     Travoprost Itching     Other reaction(s): Other (See Comments)  Blurry vision     Objective:     Vital Signs (Most Recent):  Temp: 96.7 °F (35.9 °C) (02/08/19 0326)  Pulse: 93 (02/08/19 0326)  Resp: 18 (02/08/19 0326)  BP: (!) 158/79 (02/08/19 0326)  SpO2: 100 % (02/08/19 0326) Vital Signs (24h Range):  Temp:  [96.7 °F (35.9 °C)-98.8 °F (37.1 °C)] 96.7 °F (35.9 °C)  Pulse:  [65-98] 93  Resp:  [12-19] 18  SpO2:  [92 %-100 %] 100 %  BP: ()/(44-98) 158/79     Weight: 75.4 kg (166 lb 3.6 oz)  Body mass index is 31.41 kg/m².    Intake/Output - Last 3 Shifts       02/06 0700 - 02/07 0659 02/07 0700 - 02/08 0659 02/08 0700 - 02/09 0659    I.V. (mL/kg) 1140.2 (15.1)      IV Piggyback  1550     TPN 1598.8 631.7     Total Intake(mL/kg) 2739 (36.3) 2181.7 (28.9)     Urine  (mL/kg/hr) 2145 (1.2) 2405 (1.3) 1500 (17.5)    Drains 80 37     Stool  0     Total Output 2225 2442 1500    Net +514 -260.3 -1500           Stool Occurrence  1 x           Physical Exam   Constitutional: She appears well-developed and well-nourished. No distress.   HENT:   Head: Normocephalic and atraumatic.   Cardiovascular: Normal rate and regular rhythm.   Pulmonary/Chest: Effort normal. No respiratory distress.   Abdominal:   Soft, appropriate TTP  Open wounds covered with adequate dressing. No signs of infection. LUCIANO drain with serous output.        Significant Labs:  CBC:   Recent Labs   Lab 02/08/19  0529   WBC 9.80   RBC 2.55*   HGB 7.6*   HCT 23.2*   *   MCV 91   MCH 29.8   MCHC 32.8     BMP:   Recent Labs   Lab 02/08/19  0529   *   *   K 4.2      CO2 25   BUN 30*   CREATININE 0.6   CALCIUM 8.6*   MG 2.2     CMP:   Recent Labs   Lab 02/08/19  0529   *   CALCIUM 8.6*   ALBUMIN 1.4*   PROT 4.6*   *   K 4.2   CO2 25      BUN 30*   CREATININE 0.6   ALKPHOS 721*   ALT 40   AST 39   BILITOT 3.7*     LFTs:   Recent Labs   Lab 02/08/19  0529   ALT 40   AST 39   ALKPHOS 721*   BILITOT 3.7*   PROT 4.6*   ALBUMIN 1.4*     Assessment/Plan:     * Pelvic abscess in female    -S/p ex-lap with drainage of abscess. On antibiotics.     Multiple skin tears    -Wound care following     Depression    -Hold home meds for now until tolerating po     Hypophosphatemia    -Replace as needed     Debility    -PT/OT; history of fall with non displaced oblique right tibia fracture at metaphysis into diaphysis. Was wearing brace.  -Severely debilited       Generalized abdominal pain    64 yo female presenting with abdominal pain, nausea, elevated lactate s/p open drainage pelvic abscess 1/25     -NPO  -TPN  -pain/nasuea meds PRN  -Continue broad spec abx - IV vanc, cipro, flagyl - estimated end date 2/26/19  -WOCN following, appreciate help  -DVT/GI prophylaxis  -PT/OT       Moderate  malnutrition    -Continue TPN  -plan for PEG placement Monday     Essential hypertension    -diltiazem, lasix     Gastroesophageal reflux disease without esophagitis    -Continue BID IV protonix     (HFpEF) heart failure with preserved ejection fraction    Last echo 8/2018 with no WMAs, grade 1DD, EF 60%  -Strict I/O, daily weights  -Continue diuresis     Moderate asthma without complication    -Continue with scheduled duonebs     Elevated troponin    -Cardiology consulted. On ASA/plavix at home  -ASA     CAD (coronary artery disease)    -ASA     Type 2 diabetes mellitus    -SSI         Nunu Cee PA-C   [id number]  General Surgery  [facility]

## 2019-02-08 NOTE — ANESTHESIA PREPROCEDURE EVALUATION
02/08/2019  [facility]  Anesthesia Pre-Operative Evaluation         Patient Name: Sheryl Martines  YOB: 1955  MRN: [id number]    SUBJECTIVE:     Pre-operative evaluation for Procedure(s) (LRB):  EGD, WITH PEG TUBE INSERTION (N/A)     02/08/2019    Sheryl Martines is a 63 y.o. female w/ a significant PMHx of asthma, COPD (home O2 QHS), CAD, DM, HTN, Afib, CVA 2012 with R side deficits on Plavix, HFpEF, PE, severe debility, lap converted to open appendectomy 8/2018 complicated by cdiff, failure to thrive who initially presented with N/V and failure to thrive and was found to have pelvic abscess on CT, she is not s/p exploratory laparotomy, washout, and pelvic abscess drainage on 1/25/19. In PACU, pt had poor respiratory drive after extubation with transient desaturation which improved on BiPAP. She was also hypotensive to 60s/40s, which respond to IVFs.  She was intubated on 1/26 for airway protection. Extubated 1/28. Required CRRT on 1/26 for metabolic acidosis, which has since been discontinued.      Pt has a hx of difficult intubation and delayed emergence, CO2 narcosis needing ICU stay. She is very hesitant/resistant to endotracheal intubation and would prefer another other mode of anesthesia    Patient now presents for the above procedure(s).      LDA:        PICC Double Lumen 02/04/19 1757 left brachial (Active)   Site Assessment Clean;Dry;Intact;No redness;No swelling 2/7/2019 11:00 AM   Lumen 1 Status Infusing 2/7/2019 11:00 AM   Lumen 2 Status Infusing 2/7/2019 11:00 AM   Length maninder (cm) 39 cm 2/4/2019  5:57 PM   Current Exposed Catheter (cm) 0 cm 2/4/2019  5:57 PM   Extremity Circumference (cm) 31 cm 2/4/2019  5:57 PM   Dressing Type Transparent 2/7/2019 11:00 AM   Dressing Status Biopatch in place;Clean;Dry;Intact 2/7/2019 11:00 AM   Dressing Intervention Other  (Comment) 2/6/2019  3:00 PM   Dressing Change Due 02/12/19 2/7/2019  3:01 AM   Daily Line Review Performed 2/7/2019 11:00 AM   Number of days: 3            Port A Cath Single Lumen 01/23/19 1400 right subclavian (Active)   Accessed by: Ana Dockery 1/23/2019  2:42 PM   Dressing Type Transparent 2/8/2019  7:30 AM   Dressing Status Clean;Dry;Biopatch in place;Intact 2/8/2019  7:30 AM   Dressing Intervention Other (Comment) 2/6/2019  3:00 PM   Dressing Change Due 02/09/19 2/7/2019  3:01 AM   Line Status Blood return noted 2/8/2019  7:30 AM   Flush Performed Yes 2/7/2019 11:00 AM   Date to be Reflushed 02/08/19 2/8/2019  7:30 AM   Daily Line Review Performed 2/8/2019  7:30 AM   Type of Needle Garcia 2/7/2019 11:00 AM   Gauge 20 2/3/2019  3:00 PM   Needle Length 1 in 2/3/2019  3:00 PM   Needle Status Left in place 2/7/2019 11:00 AM   Number of days: 15            Closed/Suction Drain 01/25/19 1020 Right;Ventral Abdomen Bulb 19 Fr. (Active)   Site Description Unable to view;Other (Comment) 2/7/2019 11:00 AM   Dressing Type Gauze 2/7/2019 11:00 AM   Dressing Status Clean;Dry;Intact 2/7/2019 11:00 AM   Dressing Intervention Dressing changed 2/6/2019  3:00 PM   Drainage Serous 2/7/2019 11:00 AM   Status To bulb suction 2/7/2019 11:00 AM   Output (mL) 25 mL 2/8/2019  6:00 AM   Number of days: 14       Prev airway:   RSI with cricoid pressure. Costello #2. ETT 7.5.     Drips:    TPN ADULT CENTRAL LINE CUSTOM 50 mL/hr at 02/07/19 2200       Patient Active Problem List   Diagnosis    Type 2 diabetes mellitus    CAD (coronary artery disease)    Opiate use    CVA, old, hemiparesis    Elevated troponin    Clostridium difficile infection    Moderate asthma without complication    (HFpEF) heart failure with preserved ejection fraction    Gastroesophageal reflux disease without esophagitis    Essential hypertension    Decubitus ulcer of buttock, stage 2    Moderate malnutrition    Atrial fibrillation    Acute  metabolic encephalopathy    Closed fracture of right tibia and fibula    Anemia    Asthma with acute exacerbation    Exacerbation of asthma    Acute cystitis with hematuria    Seizure    Acute renal failure superimposed on stage 3 chronic kidney disease    Generalized abdominal pain    Debility    Hypophosphatemia    Depression    Pelvic abscess in female    Idioventricular rhythm    Overweight (BMI 25.0-29.9)    Adverse effect of adrenal cortical steroids, sequela    Multiple skin tears    Alteration in skin integrity related to surgical incision    Dysarthria and anarthria    Torus Palatinus    Alteration in skin integrity due to moisture    On total parenteral nutrition (TPN)       Review of patient's allergies indicates:   Allergen Reactions    Ace inhibitors Swelling    Carvedilol      Other reaction(s): Other (See Comments)  blister    Hydralazine analogues     Metformin Nausea And Vomiting    Tetracycline Itching    Tetracyclines Swelling    Travatan (with benzalkonium) [travoprost (benzalkonium)]     Travoprost Itching     Other reaction(s): Other (See Comments)  Blurry vision       Current Inpatient Medications:   aspirin  81 mg Oral Daily    ciprofloxacin  400 mg Intravenous Q12H    diltiaZEM  180 mg Oral Daily    enoxaparin  40 mg Subcutaneous Daily    fluticasone-vilanterol  1 puff Inhalation Daily    furosemide  40 mg Intravenous BID    metronidazole  500 mg Intravenous Q8H    miconazole nitrate 2%   Topical (Top) BID    pantoprozole (PROTONIX) IV  40 mg Intravenous Q12H    sodium chloride 0.9%  10 mL Intravenous Q6H    vancomycin (VANCOCIN) IVPB  1,000 mg Intravenous Q24H       No current facility-administered medications on file prior to encounter.      Current Outpatient Medications on File Prior to Encounter   Medication Sig Dispense Refill    acetaminophen (TYLENOL) 500 MG tablet Take 1,000 mg by mouth 2 (two) times daily as needed for Pain.      albuterol  (PROVENTIL/VENTOLIN HFA) 90 mcg/actuation inhaler Inhale 2 puffs into the lungs every 6 (six) hours as needed for Wheezing or Shortness of Breath. Rescue      albuterol-ipratropium (DUO-NEB) 2.5 mg-0.5 mg/3 mL nebulizer solution Take 3 mLs by nebulization every 4 (four) hours as needed for Wheezing or Shortness of Breath. Rescue       aspirin (ECOTRIN) 81 MG EC tablet Take 1 tablet (81 mg total) by mouth once daily. 30 tablet 11    baclofen (LIORESAL) 10 MG tablet Take 10 mg by mouth 2 (two) times daily as needed (MUSCLE SPASMS).      cefdinir (OMNICEF) 300 MG capsule TK ONE C PO  BID FOR 7 DAYS  0    cephALEXin (KEFLEX) 750 MG capsule TK ONE C PO TID FOR 5 DAYS  0    ciprofloxacin HCl (CIPRO) 250 MG tablet Take 1 tablet (250 mg total) by mouth every 12 (twelve) hours. Starting 12/15 evening 5 tablet 0    clopidogrel (PLAVIX) 75 mg tablet Take 75 mg by mouth once daily.      diclofenac sodium (VOLTAREN) 1 % Gel Apply 2 g topically daily as needed (PAIN).      diltiaZEM (CARDIZEM CD) 180 MG 24 hr capsule Take 180 mg by mouth once daily.      DULoxetine (CYMBALTA) 60 MG capsule Take 60 mg by mouth once daily.      fluticasone-vilanterol (BREO ELLIPTA) 100-25 mcg/dose diskus inhaler Inhale 1 puff into the lungs once daily. Controller      gabapentin (NEURONTIN) 300 MG capsule Take 300 mg by mouth once daily.      Lactobacillus rhamnosus-fiber 2.5 billion cell-3.5 gram PwPk Take 1 capsule by mouth.      lansoprazole (PREVACID) 30 MG capsule Take 30 mg by mouth once daily.      losartan (COZAAR) 100 MG tablet Take 100 mg by mouth once daily.       ondansetron (ZOFRAN) 4 MG tablet Take 1 tablet (4 mg total) by mouth every 6 (six) hours as needed. 30 tablet 1    ondansetron (ZOFRAN) 4 MG tablet Take 4-8 mg by mouth.      potassium chloride SA (K-DUR,KLOR-CON) 10 MEQ tablet Take 10 mEq by mouth.      predniSONE (DELTASONE) 10 MG tablet Take 4 tablets (40 mg) on 12/16, then take 1 tablet (10 mg) daily 30  tablet 0    pyridoxine, vitamin B6, (VITAMIN B-6) 50 MG Tab Take 1 tablet (50 mg total) by mouth once daily.  0    simvastatin (ZOCOR) 40 MG tablet Take 40 mg by mouth.      theophylline (THEODUR) 300 mg 24 hr capsule Take 300 mg by mouth once daily.      traMADol (ULTRAM) 50 mg tablet TK 1 T PO BID PRN  0       Past Surgical History:   Procedure Laterality Date    ABDOMINAL SURGERY      APPENDECTOMY N/A 8/26/2018    Per[facility] at [facility]    APPENDECTOMY, LAPAROSCOPIC---CONVERTED TO OPEN APPENDECTOMY @0950 N/A 8/26/2018    [facility] at [facility] OR    APPLICATION, WOUND VAC N/A 1/25/2019    [facility] OR [address] [place] [address]    BACK SURGERY      stimulator    CATARACT EXTRACTION      EXCISION, ABSCESS- drainage abdominal wall abscess N/A 1/25/2019    Performed by [facility] at [facility] OR [address] [place] [address]    HYSTERECTOMY      INCISION AND DRAINAGE, ABSCESS-  drainage of pelvic abscess N/A 1/25/2019    Performed by Monster [facility] at [facility] OR [address] [place] [address]    LAPAROTOMY, EXPLORATORY N/A 1/25/2019    Performed by Monster Laurent [facility] at [facility] OR [address] [place] [address]    TOTAL HIP ARTHROPLASTY Left     2008       Social History     Socioeconomic History    Marital status:      Spouse name: Not on file    Number of children: Not on file    Years of education: Not on file    Highest education level: Not on file   Social Needs    Financial resource strain: Not on file    Food insecurity - worry: Not on file    Food insecurity - inability: Not on file    Transportation needs - medical: Not on file    Transportation needs - non-medical: Not on file   Occupational History    Not on file   Tobacco Use    Smoking status: Never Smoker    Smokeless tobacco: Never Used   Substance and Sexual Activity    Alcohol use: No     Frequency: Never    Drug use: No    Sexual activity: No     Partners: Male   Other Topics Concern    Not on file    Social History Narrative    Not on file       OBJECTIVE:     Vital Signs Range (Last 24H):  Temp:  [35.9 °C (96.7 °F)-37.1 °C (98.8 °F)]   Pulse:  [65-98]   Resp:  [12-19]   BP: ()/(44-98)   SpO2:  [92 %-100 %]       Significant Labs:  Lab Results   Component Value Date    WBC 9.80 02/08/2019    HGB 7.6 (L) 02/08/2019    HCT 23.2 (L) 02/08/2019     (L) 02/08/2019    CHOL 159 08/23/2018    TRIG 286 (H) 02/07/2019    HDL 35 (L) 08/23/2018    ALT 40 02/08/2019    AST 39 02/08/2019     (L) 02/08/2019    K 4.2 02/08/2019     02/08/2019    CREATININE 0.6 02/08/2019    BUN 30 (H) 02/08/2019    CO2 25 02/08/2019    TSH 1.563 09/12/2018    INR 0.9 10/04/2018    HGBA1C 4.7 01/24/2019       Diagnostic Studies: No relevant studies.    EKG:   Vent. Rate : 104 BPM     Atrial Rate : 104 BPM     P-R Int : 166 ms          QRS Dur : 080 ms      QT Int : 302 ms       P-R-T Axes : 016 -38 062 degrees     QTc Int : 397 ms    Sinus tachycardia  Left axis deviation  Low voltage QRS  Abnormal ECG  When compared with ECG of 29-JAN-2019 07:44,  Vent. rate has decreased BY  64 BPM  Sinus tachycardia has replaced SVT  Confirmed by Malathi RIVER, Dariela ([id number]) on 1/31/2019 9:05:10 AM      2D ECHO:  No results found for this or any previous visit.     TRANSTHORACIC ECHO (TTE) COMPLETE 01/26/2019   Conclusion     · Normal left ventricular systolic function. The estimated ejection fraction is 60%  · No wall motion abnormalities.  · Normal LV diastolic function.  · Normal right ventricular systolic function.  · Mechanically ventilated; cannot use inferior caval vein diameter to estimate central venous pressure.           ASSESSMENT/PLAN:         Anesthesia Evaluation    I have reviewed the Patient Summary Reports.     I have reviewed the Medications.     Review of Systems  Anesthesia Hx:  Hx of Anesthetic complications  History of prior surgery of interest to airway management or planning: Personal Hx of Anesthesia  complications  Difficult Intubation Pulmonary/Ventilatory Issues (poor ventalitory drive post-op; needed BiPAP in PACU) Slow To Awaken/Delayed Emergence   Hematology/Oncology:         -- Anemia:   Cardiovascular:   Hypertension CAD  Dysrhythmias atrial fibrillation    Pulmonary:   COPD Asthma (O2 QHS as home)    Renal/:   Chronic Renal Disease (required CRRT during this admission), CRI, ARF    Hepatic/GI:   GERD    Musculoskeletal:  Spine Disorders: lumbar    Neurological:   CVA (R-sided weakness; wheelchair ), residual symptoms    Endocrine:   Diabetes, type 2, using insulin    Psych:   depression          Physical Exam  General:  Malnutrition    Airway/Jaw/Neck:  Airway Findings: Mouth Opening: Small, but > 3cm Tongue: Normal  General Airway Assessment: Adult  Mallampati: IV  TM Distance: Normal, at least 6 cm  Jaw/Neck Findings:  Neck ROM: Normal ROM      Dental:  Dental Findings: (numerous missing/chipped teeth) Periodontal disease, Severe   Chest/Lungs:  Chest/Lungs Findings: Expiratory Wheezes, Mild, Normal Respiratory Rate     Heart/Vascular:  Heart Findings: Rate: Normal  Rhythm: Regular Rhythm  Sounds: Normal        Mental Status:  Mental Status Findings:  Alert and Oriented, Cooperative         Anesthesia Plan  Type of Anesthesia, risks & benefits discussed:  Anesthesia Type:  MAC, general  Patient's Preference:   Intra-op Monitoring Plan: standard ASA monitors  Intra-op Monitoring Plan Comments:   Post Op Pain Control Plan: multimodal analgesia, IV/PO Opioids PRN and per primary service following discharge from PACU  Post Op Pain Control Plan Comments:   Induction:   IV  Beta Blocker:  Patient is not currently on a Beta-Blocker (No further documentation required).       Informed Consent: Patient understands risks and agrees with Anesthesia plan.  Questions answered. Anesthesia consent signed with patient.  ASA Score: 4     Day of Surgery Review of History & Physical:    H&P update referred to the  provider.         Ready For Surgery From Anesthesia Perspective.

## 2019-02-08 NOTE — PLAN OF CARE
SW learned in morning huddle that Pt would be getting a PEG on Monday and should be ready for [facility] as soon as Tuesday. Pt will have wound care, a drain, tube feeds and need for therapy. Pt's family has already verbalized that they would like for Pt to be referred to [facility]. SW asked that referral be initiated in [facility] today. SW to continue to follow.     Erma Godinez LCSW       02/08/19 2665   Post-Acute Status   Post-Acute Authorization Placement   Post-Acute Placement Status Referrals Sent

## 2019-02-09 LAB
ALBUMIN SERPL BCP-MCNC: 1.3 G/DL
ALP SERPL-CCNC: 599 U/L
ALT SERPL W/O P-5'-P-CCNC: 31 U/L
ANION GAP SERPL CALC-SCNC: 5 MMOL/L
AST SERPL-CCNC: 37 U/L
BASOPHILS # BLD AUTO: 0.02 K/UL
BASOPHILS NFR BLD: 0.2 %
BILIRUB SERPL-MCNC: 3.3 MG/DL
BUN SERPL-MCNC: 33 MG/DL
CA-I BLDV-SCNC: 1.22 MMOL/L
CALCIUM SERPL-MCNC: 8.7 MG/DL
CHLORIDE SERPL-SCNC: 105 MMOL/L
CO2 SERPL-SCNC: 26 MMOL/L
CREAT SERPL-MCNC: 0.6 MG/DL
DIFFERENTIAL METHOD: ABNORMAL
EOSINOPHIL # BLD AUTO: 0.7 K/UL
EOSINOPHIL NFR BLD: 8.2 %
ERYTHROCYTE [DISTWIDTH] IN BLOOD BY AUTOMATED COUNT: 18.5 %
EST. GFR  (AFRICAN AMERICAN): >60 ML/MIN/1.73 M^2
EST. GFR  (NON AFRICAN AMERICAN): >60 ML/MIN/1.73 M^2
GLUCOSE SERPL-MCNC: 154 MG/DL
HCT VFR BLD AUTO: 22.7 %
HGB BLD-MCNC: 7.1 G/DL
IMM GRANULOCYTES # BLD AUTO: 0.06 K/UL
IMM GRANULOCYTES NFR BLD AUTO: 0.7 %
LYMPHOCYTES # BLD AUTO: 1.8 K/UL
LYMPHOCYTES NFR BLD: 20.9 %
MAGNESIUM SERPL-MCNC: 2 MG/DL
MCH RBC QN AUTO: 28.7 PG
MCHC RBC AUTO-ENTMCNC: 31.3 G/DL
MCV RBC AUTO: 92 FL
MONOCYTES # BLD AUTO: 0.9 K/UL
MONOCYTES NFR BLD: 10 %
NEUTROPHILS # BLD AUTO: 5.3 K/UL
NEUTROPHILS NFR BLD: 60 %
NRBC BLD-RTO: 0 /100 WBC
PHOSPHATE SERPL-MCNC: 4.2 MG/DL
PLATELET # BLD AUTO: 141 K/UL
PMV BLD AUTO: 12.3 FL
POCT GLUCOSE: 153 MG/DL (ref 70–110)
POCT GLUCOSE: 157 MG/DL (ref 70–110)
POCT GLUCOSE: 159 MG/DL (ref 70–110)
POCT GLUCOSE: 190 MG/DL (ref 70–110)
POCT GLUCOSE: 198 MG/DL (ref 70–110)
POTASSIUM SERPL-SCNC: 4.1 MMOL/L
PROT SERPL-MCNC: 4.8 G/DL
RBC # BLD AUTO: 2.47 M/UL
SODIUM SERPL-SCNC: 136 MMOL/L
WBC # BLD AUTO: 8.79 K/UL

## 2019-02-09 PROCEDURE — 25000003 PHARM REV CODE 250: Performed by: SURGERY

## 2019-02-09 PROCEDURE — 99900035 HC TECH TIME PER 15 MIN (STAT)

## 2019-02-09 PROCEDURE — 63600175 PHARM REV CODE 636 W HCPCS: Performed by: STUDENT IN AN ORGANIZED HEALTH CARE EDUCATION/TRAINING PROGRAM

## 2019-02-09 PROCEDURE — C9113 INJ PANTOPRAZOLE SODIUM, VIA: HCPCS | Performed by: PHYSICIAN ASSISTANT

## 2019-02-09 PROCEDURE — S0030 INJECTION, METRONIDAZOLE: HCPCS | Performed by: SURGERY

## 2019-02-09 PROCEDURE — 27000221 HC OXYGEN, UP TO 24 HOURS

## 2019-02-09 PROCEDURE — 85025 COMPLETE CBC W/AUTO DIFF WBC: CPT

## 2019-02-09 PROCEDURE — 25000003 PHARM REV CODE 250: Performed by: STUDENT IN AN ORGANIZED HEALTH CARE EDUCATION/TRAINING PROGRAM

## 2019-02-09 PROCEDURE — 63600175 PHARM REV CODE 636 W HCPCS: Performed by: SURGERY

## 2019-02-09 PROCEDURE — 94761 N-INVAS EAR/PLS OXIMETRY MLT: CPT

## 2019-02-09 PROCEDURE — 63600175 PHARM REV CODE 636 W HCPCS: Performed by: PHYSICIAN ASSISTANT

## 2019-02-09 PROCEDURE — B4185 PARENTERAL SOL 10 GM LIPIDS: HCPCS | Performed by: STUDENT IN AN ORGANIZED HEALTH CARE EDUCATION/TRAINING PROGRAM

## 2019-02-09 PROCEDURE — A4216 STERILE WATER/SALINE, 10 ML: HCPCS | Performed by: SURGERY

## 2019-02-09 PROCEDURE — 94664 DEMO&/EVAL PT USE INHALER: CPT

## 2019-02-09 PROCEDURE — 25000242 PHARM REV CODE 250 ALT 637 W/ HCPCS: Performed by: STUDENT IN AN ORGANIZED HEALTH CARE EDUCATION/TRAINING PROGRAM

## 2019-02-09 PROCEDURE — 83735 ASSAY OF MAGNESIUM: CPT

## 2019-02-09 PROCEDURE — A4217 STERILE WATER/SALINE, 500 ML: HCPCS | Performed by: STUDENT IN AN ORGANIZED HEALTH CARE EDUCATION/TRAINING PROGRAM

## 2019-02-09 PROCEDURE — 94640 AIRWAY INHALATION TREATMENT: CPT

## 2019-02-09 PROCEDURE — 84100 ASSAY OF PHOSPHORUS: CPT

## 2019-02-09 PROCEDURE — 80053 COMPREHEN METABOLIC PANEL: CPT

## 2019-02-09 PROCEDURE — 82330 ASSAY OF CALCIUM: CPT

## 2019-02-09 PROCEDURE — 97110 THERAPEUTIC EXERCISES: CPT

## 2019-02-09 PROCEDURE — 20600001 HC STEP DOWN PRIVATE ROOM

## 2019-02-09 RX ORDER — ACETAMINOPHEN 10 MG/ML
1000 INJECTION, SOLUTION INTRAVENOUS EVERY 6 HOURS
Status: COMPLETED | OUTPATIENT
Start: 2019-02-09 | End: 2019-02-10

## 2019-02-09 RX ORDER — KETOROLAC TROMETHAMINE 15 MG/ML
15 INJECTION, SOLUTION INTRAMUSCULAR; INTRAVENOUS EVERY 6 HOURS
Status: COMPLETED | OUTPATIENT
Start: 2019-02-09 | End: 2019-02-12

## 2019-02-09 RX ORDER — HYDROMORPHONE HYDROCHLORIDE 1 MG/ML
1 INJECTION, SOLUTION INTRAMUSCULAR; INTRAVENOUS; SUBCUTANEOUS
Status: DISCONTINUED | OUTPATIENT
Start: 2019-02-09 | End: 2019-02-15

## 2019-02-09 RX ADMIN — CIPROFLOXACIN 400 MG: 2 INJECTION, SOLUTION INTRAVENOUS at 10:02

## 2019-02-09 RX ADMIN — METRONIDAZOLE 500 MG: 500 INJECTION, SOLUTION INTRAVENOUS at 02:02

## 2019-02-09 RX ADMIN — KETOROLAC TROMETHAMINE 15 MG: 15 INJECTION, SOLUTION INTRAMUSCULAR; INTRAVENOUS at 11:02

## 2019-02-09 RX ADMIN — ACETAMINOPHEN 1000 MG: 10 INJECTION, SOLUTION INTRAVENOUS at 02:02

## 2019-02-09 RX ADMIN — ENOXAPARIN SODIUM 40 MG: 100 INJECTION SUBCUTANEOUS at 05:02

## 2019-02-09 RX ADMIN — POTASSIUM CHLORIDE: 2 INJECTION, SOLUTION, CONCENTRATE INTRAVENOUS at 09:02

## 2019-02-09 RX ADMIN — IPRATROPIUM BROMIDE AND ALBUTEROL SULFATE 3 ML: .5; 3 SOLUTION RESPIRATORY (INHALATION) at 09:02

## 2019-02-09 RX ADMIN — FLUTICASONE FUROATE AND VILANTEROL TRIFENATATE 1 PUFF: 100; 25 POWDER RESPIRATORY (INHALATION) at 08:02

## 2019-02-09 RX ADMIN — HYDROMORPHONE HYDROCHLORIDE 0.5 MG: 1 INJECTION, SOLUTION INTRAMUSCULAR; INTRAVENOUS; SUBCUTANEOUS at 10:02

## 2019-02-09 RX ADMIN — METRONIDAZOLE 500 MG: 500 INJECTION, SOLUTION INTRAVENOUS at 05:02

## 2019-02-09 RX ADMIN — INSULIN ASPART 2 UNITS: 100 INJECTION, SOLUTION INTRAVENOUS; SUBCUTANEOUS at 06:02

## 2019-02-09 RX ADMIN — CIPROFLOXACIN 400 MG: 2 INJECTION, SOLUTION INTRAVENOUS at 09:02

## 2019-02-09 RX ADMIN — IPRATROPIUM BROMIDE AND ALBUTEROL SULFATE 3 ML: .5; 3 SOLUTION RESPIRATORY (INHALATION) at 12:02

## 2019-02-09 RX ADMIN — Medication 10 ML: at 06:02

## 2019-02-09 RX ADMIN — Medication 10 ML: at 12:02

## 2019-02-09 RX ADMIN — HYDROMORPHONE HYDROCHLORIDE 0.5 MG: 1 INJECTION, SOLUTION INTRAMUSCULAR; INTRAVENOUS; SUBCUTANEOUS at 08:02

## 2019-02-09 RX ADMIN — KETOROLAC TROMETHAMINE 15 MG: 15 INJECTION, SOLUTION INTRAMUSCULAR; INTRAVENOUS at 01:02

## 2019-02-09 RX ADMIN — PANTOPRAZOLE SODIUM 40 MG: 40 INJECTION, POWDER, LYOPHILIZED, FOR SOLUTION INTRAVENOUS at 08:02

## 2019-02-09 RX ADMIN — ACETAMINOPHEN 1000 MG: 10 INJECTION, SOLUTION INTRAVENOUS at 11:02

## 2019-02-09 RX ADMIN — KETOROLAC TROMETHAMINE 15 MG: 15 INJECTION, SOLUTION INTRAMUSCULAR; INTRAVENOUS at 05:02

## 2019-02-09 RX ADMIN — INSULIN ASPART 2 UNITS: 100 INJECTION, SOLUTION INTRAVENOUS; SUBCUTANEOUS at 11:02

## 2019-02-09 RX ADMIN — HYDROMORPHONE HYDROCHLORIDE 1 MG: 1 INJECTION, SOLUTION INTRAMUSCULAR; INTRAVENOUS; SUBCUTANEOUS at 03:02

## 2019-02-09 RX ADMIN — PANTOPRAZOLE SODIUM 40 MG: 40 INJECTION, POWDER, LYOPHILIZED, FOR SOLUTION INTRAVENOUS at 09:02

## 2019-02-09 RX ADMIN — HYDROMORPHONE HYDROCHLORIDE 1 MG: 1 INJECTION, SOLUTION INTRAMUSCULAR; INTRAVENOUS; SUBCUTANEOUS at 09:02

## 2019-02-09 RX ADMIN — METRONIDAZOLE 500 MG: 500 INJECTION, SOLUTION INTRAVENOUS at 10:02

## 2019-02-09 RX ADMIN — INSULIN ASPART 1 UNITS: 100 INJECTION, SOLUTION INTRAVENOUS; SUBCUTANEOUS at 02:02

## 2019-02-09 RX ADMIN — HYDROMORPHONE HYDROCHLORIDE 0.5 MG: 1 INJECTION, SOLUTION INTRAMUSCULAR; INTRAVENOUS; SUBCUTANEOUS at 04:02

## 2019-02-09 RX ADMIN — MICONAZOLE NITRATE: 20 OINTMENT TOPICAL at 09:02

## 2019-02-09 RX ADMIN — SOYBEAN OIL 250 ML: 20 INJECTION, SOLUTION INTRAVENOUS at 09:02

## 2019-02-09 RX ADMIN — HYDROMORPHONE HYDROCHLORIDE 0.5 MG: 1 INJECTION, SOLUTION INTRAMUSCULAR; INTRAVENOUS; SUBCUTANEOUS at 12:02

## 2019-02-09 RX ADMIN — IPRATROPIUM BROMIDE AND ALBUTEROL SULFATE 3 ML: .5; 3 SOLUTION RESPIRATORY (INHALATION) at 05:02

## 2019-02-09 RX ADMIN — MICONAZOLE NITRATE: 20 OINTMENT TOPICAL at 08:02

## 2019-02-09 NOTE — PROGRESS NOTES
"[facility]  General Surgery  Progress Note    Subjective:     History of Present Illness:  62 yo W with a history of HTN, COPD on home oxygen, HFpEF, IDDMII, CVA on plavix, HTN, debility after fall and broken hip, and PE who presents to the ED with a several month history of nausea, vomiting, inability to tolerate a diet and weight loss. She has had multiple admissions in the past few months for different ailments. She presents today with continued nausea, vomiting and abdominal pain that is mostly worse in the RLQ. During the examination, she states her pain was all over her abdomen because she was retching so much. She states that she has lost close to 40lbs since her surgery and that she only able to keep broth down. She had a lap converted to open appendectomy back in August 2018 that was complicated by a wound infection, C diff and failure to thrive. This seems to persists. She is now wheelchair bound (per her) and apparently lives in [place] but is here in [place] with her daughter.    She had a CT scan in the ED which revealed an irregular shaped rim enhancing fluid collection measuring at least 4.0 x 3.0 cm ight hemipelvis at the site of prior appendectomy. Surgery was consulted for further recommendations. She was also noted to have a "knot" at the RLQ incision site that is also tender.    Post-Op Info:  Procedure(s) (LRB):  LAPAROTOMY, EXPLORATORY (N/A)  INCISION AND DRAINAGE, ABSCESS-  drainage of pelvic abscess (N/A)  EXCISION, ABSCESS- drainage abdominal wall abscess (N/A)  APPLICATION, WOUND VAC (N/A)   15 Days Post-Op     Interval History:  Patient seen and examined, no acute events overnight  Failed MBSS  Abdominal pain well controlled  +F/ 0 BM  LUCIANO drain put out 30mL  Afebrile/VSS    Medications:  Continuous Infusions:   TPN ADULT CENTRAL LINE CUSTOM 50 mL/hr at 02/08/19 2211     Scheduled Meds:   aspirin  81 mg Oral Daily    ciprofloxacin  400 mg Intravenous Q12H    " diltiaZEM  180 mg Oral Daily    enoxaparin  40 mg Subcutaneous Daily    fat emulsion 20%  250 mL Intravenous Daily    fluticasone-vilanterol  1 puff Inhalation Daily    furosemide  40 mg Intravenous BID    metronidazole  500 mg Intravenous Q8H    miconazole nitrate 2%   Topical (Top) BID    pantoprozole (PROTONIX) IV  40 mg Intravenous Q12H    sodium chloride 0.9%  10 mL Intravenous Q6H     PRN Meds:albuterol sulfate, albuterol-ipratropium, dextrose 50%, diclofenac sodium, diphenhydrAMINE, glucagon (human recombinant), hydrALAZINE, HYDROmorphone, insulin aspart U-100, ketorolac, ondansetron, promethazine (PHENERGAN) IVPB, Flushing PICC Protocol **AND** sodium chloride 0.9% **AND** sodium chloride 0.9%, sodium chloride 0.9%, sodium chloride 0.9%, white petrolatum     Review of patient's allergies indicates:   Allergen Reactions    Ace inhibitors Swelling    Carvedilol      Other reaction(s): Other (See Comments)  blister    Hydralazine analogues     Metformin Nausea And Vomiting    Tetracycline Itching    Tetracyclines Swelling    Travatan (with benzalkonium) [travoprost (benzalkonium)]     Travoprost Itching     Other reaction(s): Other (See Comments)  Blurry vision     Objective:     Vital Signs (Most Recent):  Temp: 98.3 °F (36.8 °C) (02/09/19 0447)  Pulse: 96 (02/09/19 0447)  Resp: 20 (02/09/19 0447)  BP: 119/63 (02/09/19 0447)  SpO2: 100 % (02/09/19 0447) Vital Signs (24h Range):  Temp:  [97.3 °F (36.3 °C)-98.3 °F (36.8 °C)] 98.3 °F (36.8 °C)  Pulse:  [] 96  Resp:  [16-20] 20  SpO2:  [91 %-100 %] 100 %  BP: ()/(52-92) 119/63     Weight: 75.4 kg (166 lb 3.6 oz)  Body mass index is 31.41 kg/m².    Intake/Output - Last 3 Shifts       02/07 0700 - 02/08 0659 02/08 0700 - 02/09 0659    P.O.  0    IV Piggyback 1550 2250    .7 1555.6    Total Intake(mL/kg) 2181.7 (28.9) 3805.6 (50.5)    Urine (mL/kg/hr) 2405 (1.3) 1500 (0.8)    Drains 37 30    Stool 0 0    Total Output 2442 1530     Net -260.3 +2275.6          Urine Occurrence  15 x    Stool Occurrence 1 x 0 x          Physical Exam   Constitutional: She appears well-developed and well-nourished. No distress.   HENT:   Head: Normocephalic and atraumatic.   Cardiovascular: Normal rate and regular rhythm.   Pulmonary/Chest: Effort normal. No respiratory distress.   Abdominal:   Soft, appropriate TTP  Open wounds covered with adequate dressing. No signs of infection. LUCIANO drain with serous output.        Significant Labs:  CBC:   Recent Labs   Lab 02/09/19  0450   WBC 8.79   RBC 2.47*   HGB 7.1*   HCT 22.7*   *   MCV 92   MCH 28.7   MCHC 31.3*       CMP:   Recent Labs   Lab 02/09/19  0450   *   CALCIUM 8.7   ALBUMIN 1.3*   PROT 4.8*      K 4.1   CO2 26      BUN 33*   CREATININE 0.6   ALKPHOS 599*   ALT 31   AST 37   BILITOT 3.3*     LFTs:   Recent Labs   Lab 02/09/19  0450   ALT 31   AST 37   ALKPHOS 599*   BILITOT 3.3*   PROT 4.8*   ALBUMIN 1.3*     Assessment/Plan:     * Pelvic abscess in female    -S/p ex-lap with drainage of abscess. On antibiotics.  - Vanc completed.   4 weeks total of cipro flagyl      Dysarthria and anarthria    Discuss IR G tube placement.   Would need NGT placed prior to IR      Multiple skin tears    -Wound care following     Depression    -Hold home meds for now until tolerating po     Hypophosphatemia    -Replace as needed     Debility    -PT/OT; history of fall with non displaced oblique right tibia fracture at metaphysis into diaphysis. Was wearing brace.  -Severely debilited       Generalized abdominal pain    62 yo female presenting with abdominal pain, nausea, elevated lactate s/p open drainage pelvic abscess 1/25     -NPO  -TPN  -pain/nasuea meds PRN  -Continue broad spec abx - IV vanc, cipro, flagyl - estimated end date 2/26/19  -WOCN following, appreciate help  -DVT/GI prophylaxis  -PT/OT       Moderate malnutrition    -Continue TPN  -plan for PEG placement Monday     Essential  hypertension    -diltiazem, lasix     Gastroesophageal reflux disease without esophagitis    -Continue BID IV protonix     (HFpEF) heart failure with preserved ejection fraction    Last echo 8/2018 with no WMAs, grade 1DD, EF 60%  -Strict I/O, daily weights  -Continue diuresis     Moderate asthma without complication    -Continue with scheduled duonebs     Elevated troponin    -Cardiology consulted. On ASA/plavix at home  -ASA     CAD (coronary artery disease)    -ASA     Type 2 diabetes mellitus    -SSI         Sudhakar López MD  General Surgery  [facility]

## 2019-02-09 NOTE — PLAN OF CARE
Problem: Adult Inpatient Plan of Care  Goal: Plan of Care Review  Pt is AO x 4. Ritchie removed this am, pt urinated about x10 next 2 hours due to lasix IV. Pt refuses the pm lasix due to too much urination. Vanc troth done, changed abdominal wounds dressing, edema in upper and lower extremities is reduced. No BM.

## 2019-02-09 NOTE — PT/OT/SLP PROGRESS
"Occupational Therapy   Treatment    Name: Sheryl Martines  MRN: [id number]  Admitting Diagnosis:  Pelvic abscess in female  15 Days Post-Op    Recommendations:     Discharge Recommendations: nursing facility, skilled  Discharge Equipment Recommendations:  none  Barriers to discharge:  Decreased caregiver support    Assessment:     Sheryl Martines is a 63 y.o. female with a medical diagnosis of Pelvic abscess in female.  She presents with performance deficits affecting function are weakness, impaired self care skills, gait instability, impaired functional mobilty, impaired cardiopulmonary response to activity, impaired balance, pain. Pt present with self-limiting behaviors and pain inhibiting pt from engaging in self-care tasks and functional mobility independently. Pt refused all OOB ax this date after max encouragement/education from therapist in regards to risk of prolonged stay in the bed. Pt reported 8/10 in stomach and R hip and reports requesting pain medicine "along time ago". RN brought pt with pain medicine and administered through IV and  therapist provided pt with 15 mins for pain medicine to provide relief. Pt continued to refused OOB ax 2/2 pain and would be agreeable to therapy later in PM but OT was unable to return in PM.     Rehab Prognosis:  Fair; patient would benefit from acute skilled OT services to address these deficits and reach maximum level of function.       Plan:     Patient to be seen 2 x/week to address the above listed problems via self-care/home management, therapeutic activities, therapeutic exercises  · Plan of Care Expires: 02/28/19  · Plan of Care Reviewed with: patient    Subjective     Pain/Comfort:  Pain Rating 1: 8/10  Location 1: (stomach and R hip )  Pain Addressed 1: Reposition, Distraction, Pre-medicate for activity  Pain Rating Post-Intervention 1: 8/10    Objective:     Communicated with: RN prior to session.  Patient found right sidelying with telemetry, LUCIANO drain, nieves " catheter, PICC line(port-a-cath ) upon OT entry to room. Pt refusing all OOB this date after max encouragement to participate.     General Precautions: Standard, aspiration, contact, fall   Orthopedic Precautions:N/A   Braces: N/A     Occupational Performance:     Bed Mobility:    · Rolling to the L with max A    Functional Mobility/Transfers:  · Pt refused all OOB ax     Activities of Daily Living:  · Feeding:  stand by assistance simulated self feeding via hand to mouth motor pattern; simulated cutting food with B hand coordination    · Total A to don B  socks     [facility] 6 Click ADL: 9    Treatment & Education:  - OT provided education on B UE/LE AAROM exercises in all available planes in order to improved B UE/LE function and maintain joint integrity.  - OT assisted pt with B UE/LE AAROM exercises with HOB elevated for x 10 reps in all available planes.   - OT provided education on the importance on OOB ax and participating in therapy session. Pt refused any OOB ax until receiving pain medicine. OT waited ~15 mins after pain medication was administer and pt continue to refused any OOB ax 2/2 still experiencing 8/10 pain.  - supine therex performed only this date.   - whiteboard updated.     Patient left HOB elevated with all lines intact, call button in reach and RN notifiedEducation:      GOALS:   Multidisciplinary Problems     Occupational Therapy Goals        Problem: Occupational Therapy Goal    Goal Priority Disciplines Outcome Interventions   Occupational Therapy Goal     OT, PT/OT Ongoing (interventions implemented as appropriate)    Description:  Goals to be met by: 2/12/19     Patient will increase functional independence with ADLs by performing:    Feeding with Set-up Assistance. - MET 2/9  UE Dressing with Set-up Assistance.  LE Dressing with Moderate Assistance.  Grooming while seated with Set-up Assistance.  Toileting from bedside commode with Maximum Assistance for hygiene and clothing  management.   Toilet transfer to bedside commode with Maximum Assistance.                       Time Tracking:     OT Date of Treatment: 02/09/19  OT Start Time: 1012  OT Stop Time: 1032  OT Total Time (min): 20 min    Billable Minutes:Therapeutic Exercise 15    Ashley Baca, OT  2/9/2019

## 2019-02-09 NOTE — SUBJECTIVE & OBJECTIVE
Interval History:  Patient seen and examined, no acute events overnight  Failed MBSS  Abdominal pain well controlled  +F/ 0 BM  LUCIANO drain put out 30mL  Afebrile/VSS    Medications:  Continuous Infusions:   TPN ADULT CENTRAL LINE CUSTOM 50 mL/hr at 02/08/19 2211     Scheduled Meds:   aspirin  81 mg Oral Daily    ciprofloxacin  400 mg Intravenous Q12H    diltiaZEM  180 mg Oral Daily    enoxaparin  40 mg Subcutaneous Daily    fat emulsion 20%  250 mL Intravenous Daily    fluticasone-vilanterol  1 puff Inhalation Daily    furosemide  40 mg Intravenous BID    metronidazole  500 mg Intravenous Q8H    miconazole nitrate 2%   Topical (Top) BID    pantoprozole (PROTONIX) IV  40 mg Intravenous Q12H    sodium chloride 0.9%  10 mL Intravenous Q6H     PRN Meds:albuterol sulfate, albuterol-ipratropium, dextrose 50%, diclofenac sodium, diphenhydrAMINE, glucagon (human recombinant), hydrALAZINE, HYDROmorphone, insulin aspart U-100, ketorolac, ondansetron, promethazine (PHENERGAN) IVPB, Flushing PICC Protocol **AND** sodium chloride 0.9% **AND** sodium chloride 0.9%, sodium chloride 0.9%, sodium chloride 0.9%, white petrolatum     Review of patient's allergies indicates:   Allergen Reactions    Ace inhibitors Swelling    Carvedilol      Other reaction(s): Other (See Comments)  blister    Hydralazine analogues     Metformin Nausea And Vomiting    Tetracycline Itching    Tetracyclines Swelling    Travatan (with benzalkonium) [travoprost (benzalkonium)]     Travoprost Itching     Other reaction(s): Other (See Comments)  Blurry vision     Objective:     Vital Signs (Most Recent):  Temp: 98.3 °F (36.8 °C) (02/09/19 0447)  Pulse: 96 (02/09/19 0447)  Resp: 20 (02/09/19 0447)  BP: 119/63 (02/09/19 0447)  SpO2: 100 % (02/09/19 0447) Vital Signs (24h Range):  Temp:  [97.3 °F (36.3 °C)-98.3 °F (36.8 °C)] 98.3 °F (36.8 °C)  Pulse:  [] 96  Resp:  [16-20] 20  SpO2:  [91 %-100 %] 100 %  BP: ()/(52-92) 119/63      Weight: 75.4 kg (166 lb 3.6 oz)  Body mass index is 31.41 kg/m².    Intake/Output - Last 3 Shifts       02/07 0700 - 02/08 0659 02/08 0700 - 02/09 0659    P.O.  0    IV Piggyback 1550 2250    .7 1555.6    Total Intake(mL/kg) 2181.7 (28.9) 3805.6 (50.5)    Urine (mL/kg/hr) 2405 (1.3) 1500 (0.8)    Drains 37 30    Stool 0 0    Total Output 2442 1530    Net -260.3 +2275.6          Urine Occurrence  15 x    Stool Occurrence 1 x 0 x          Physical Exam   Constitutional: She appears well-developed and well-nourished. No distress.   HENT:   Head: Normocephalic and atraumatic.   Cardiovascular: Normal rate and regular rhythm.   Pulmonary/Chest: Effort normal. No respiratory distress.   Abdominal:   Soft, appropriate TTP  Open wounds covered with adequate dressing. No signs of infection. LUCIANO drain with serous output.        Significant Labs:  CBC:   Recent Labs   Lab 02/09/19 0450   WBC 8.79   RBC 2.47*   HGB 7.1*   HCT 22.7*   *   MCV 92   MCH 28.7   MCHC 31.3*       CMP:   Recent Labs   Lab 02/09/19 0450   *   CALCIUM 8.7   ALBUMIN 1.3*   PROT 4.8*      K 4.1   CO2 26      BUN 33*   CREATININE 0.6   ALKPHOS 599*   ALT 31   AST 37   BILITOT 3.3*     LFTs:   Recent Labs   Lab 02/09/19 0450   ALT 31   AST 37   ALKPHOS 599*   BILITOT 3.3*   PROT 4.8*   ALBUMIN 1.3*

## 2019-02-09 NOTE — ASSESSMENT & PLAN NOTE
-S/p ex-lap with drainage of abscess. On antibiotics.  - Vanc completed.   4 weeks total of cipro flagyl

## 2019-02-09 NOTE — CARE UPDATE
BG goal 140-180     Patient has a history of DM  Remains NPO on TPN  Plan for PEG on Monday  BG fairly well controlled with correction scale insulin administration  Continue moderate dose correction scale with BG monitoring q 4 hours     Endocrine to continue to follow     ** Please call Endocrine for any BG related issues **

## 2019-02-09 NOTE — PLAN OF CARE
Problem: Occupational Therapy Goal  Goal: Occupational Therapy Goal  Goals to be met by: 2/12/19     Patient will increase functional independence with ADLs by performing:    Feeding with Set-up Assistance. - MET 2/9  UE Dressing with Set-up Assistance.  LE Dressing with Moderate Assistance.  Grooming while seated with Set-up Assistance.  Toileting from bedside commode with Maximum Assistance for hygiene and clothing management.   Toilet transfer to bedside commode with Maximum Assistance.     Outcome: Ongoing (interventions implemented as appropriate)  Continue POC     Ashley Baca, OTR/L  [phone]  2/9/2019

## 2019-02-09 NOTE — PLAN OF CARE
Problem: Adult Inpatient Plan of Care  Goal: Plan of Care Review  Outcome: Ongoing (interventions implemented as appropriate)  POC reviewed with pt who verbalized understanding. AAOx4. VSS on 1L NC. Remains free of falls and injury. Abd dressings intact. Right abd LUCIANO in place. TPN and lipids infusing throughout shift. Pain managed with PRN meds per MAR. Voiding per bedpan. Tele monitored. Blood glucose monitored Q4 and covered per MAR. No acute events. No distress noted. Will continue to monitor.

## 2019-02-10 LAB
ALBUMIN SERPL BCP-MCNC: 1.4 G/DL
ALP SERPL-CCNC: 535 U/L
ALT SERPL W/O P-5'-P-CCNC: 27 U/L
ANION GAP SERPL CALC-SCNC: 5 MMOL/L
AST SERPL-CCNC: 32 U/L
BASOPHILS # BLD AUTO: 0.03 K/UL
BASOPHILS NFR BLD: 0.4 %
BILIRUB SERPL-MCNC: 3.4 MG/DL
BUN SERPL-MCNC: 37 MG/DL
CA-I BLDV-SCNC: 1.25 MMOL/L
CALCIUM SERPL-MCNC: 8.8 MG/DL
CHLORIDE SERPL-SCNC: 105 MMOL/L
CO2 SERPL-SCNC: 25 MMOL/L
CREAT SERPL-MCNC: 0.7 MG/DL
DIFFERENTIAL METHOD: ABNORMAL
EOSINOPHIL # BLD AUTO: 0.9 K/UL
EOSINOPHIL NFR BLD: 10 %
ERYTHROCYTE [DISTWIDTH] IN BLOOD BY AUTOMATED COUNT: 18.9 %
EST. GFR  (AFRICAN AMERICAN): >60 ML/MIN/1.73 M^2
EST. GFR  (NON AFRICAN AMERICAN): >60 ML/MIN/1.73 M^2
GLUCOSE SERPL-MCNC: 127 MG/DL
HCT VFR BLD AUTO: 22.2 %
HGB BLD-MCNC: 7.1 G/DL
IMM GRANULOCYTES # BLD AUTO: 0.04 K/UL
IMM GRANULOCYTES NFR BLD AUTO: 0.5 %
LYMPHOCYTES # BLD AUTO: 2 K/UL
LYMPHOCYTES NFR BLD: 23.8 %
MAGNESIUM SERPL-MCNC: 2.3 MG/DL
MCH RBC QN AUTO: 29.6 PG
MCHC RBC AUTO-ENTMCNC: 32 G/DL
MCV RBC AUTO: 93 FL
MONOCYTES # BLD AUTO: 0.9 K/UL
MONOCYTES NFR BLD: 10.6 %
NEUTROPHILS # BLD AUTO: 4.6 K/UL
NEUTROPHILS NFR BLD: 54.7 %
NRBC BLD-RTO: 0 /100 WBC
PHOSPHATE SERPL-MCNC: 4 MG/DL
PLATELET # BLD AUTO: 137 K/UL
PMV BLD AUTO: 12.6 FL
POCT GLUCOSE: 147 MG/DL (ref 70–110)
POCT GLUCOSE: 185 MG/DL (ref 70–110)
POCT GLUCOSE: 189 MG/DL (ref 70–110)
POCT GLUCOSE: 198 MG/DL (ref 70–110)
POTASSIUM SERPL-SCNC: 4.2 MMOL/L
PROT SERPL-MCNC: 4.6 G/DL
RBC # BLD AUTO: 2.4 M/UL
SODIUM SERPL-SCNC: 135 MMOL/L
TRIGL SERPL-MCNC: 476 MG/DL
WBC # BLD AUTO: 8.48 K/UL

## 2019-02-10 PROCEDURE — 94664 DEMO&/EVAL PT USE INHALER: CPT

## 2019-02-10 PROCEDURE — 63600175 PHARM REV CODE 636 W HCPCS: Performed by: PHYSICIAN ASSISTANT

## 2019-02-10 PROCEDURE — 99900035 HC TECH TIME PER 15 MIN (STAT)

## 2019-02-10 PROCEDURE — 84100 ASSAY OF PHOSPHORUS: CPT

## 2019-02-10 PROCEDURE — 85025 COMPLETE CBC W/AUTO DIFF WBC: CPT

## 2019-02-10 PROCEDURE — 63600175 PHARM REV CODE 636 W HCPCS: Performed by: STUDENT IN AN ORGANIZED HEALTH CARE EDUCATION/TRAINING PROGRAM

## 2019-02-10 PROCEDURE — 20600001 HC STEP DOWN PRIVATE ROOM

## 2019-02-10 PROCEDURE — 25000242 PHARM REV CODE 250 ALT 637 W/ HCPCS: Performed by: STUDENT IN AN ORGANIZED HEALTH CARE EDUCATION/TRAINING PROGRAM

## 2019-02-10 PROCEDURE — C9113 INJ PANTOPRAZOLE SODIUM, VIA: HCPCS | Performed by: PHYSICIAN ASSISTANT

## 2019-02-10 PROCEDURE — 25000003 PHARM REV CODE 250: Performed by: SURGERY

## 2019-02-10 PROCEDURE — S0030 INJECTION, METRONIDAZOLE: HCPCS | Performed by: SURGERY

## 2019-02-10 PROCEDURE — 94640 AIRWAY INHALATION TREATMENT: CPT

## 2019-02-10 PROCEDURE — 94761 N-INVAS EAR/PLS OXIMETRY MLT: CPT

## 2019-02-10 PROCEDURE — 82330 ASSAY OF CALCIUM: CPT

## 2019-02-10 PROCEDURE — A4216 STERILE WATER/SALINE, 10 ML: HCPCS | Performed by: SURGERY

## 2019-02-10 PROCEDURE — 83735 ASSAY OF MAGNESIUM: CPT

## 2019-02-10 PROCEDURE — 63600175 PHARM REV CODE 636 W HCPCS: Performed by: SURGERY

## 2019-02-10 PROCEDURE — B4185 PARENTERAL SOL 10 GM LIPIDS: HCPCS | Performed by: STUDENT IN AN ORGANIZED HEALTH CARE EDUCATION/TRAINING PROGRAM

## 2019-02-10 PROCEDURE — 27000221 HC OXYGEN, UP TO 24 HOURS

## 2019-02-10 PROCEDURE — A4217 STERILE WATER/SALINE, 500 ML: HCPCS | Performed by: STUDENT IN AN ORGANIZED HEALTH CARE EDUCATION/TRAINING PROGRAM

## 2019-02-10 PROCEDURE — 80053 COMPREHEN METABOLIC PANEL: CPT

## 2019-02-10 PROCEDURE — 84478 ASSAY OF TRIGLYCERIDES: CPT

## 2019-02-10 PROCEDURE — 25000003 PHARM REV CODE 250: Performed by: STUDENT IN AN ORGANIZED HEALTH CARE EDUCATION/TRAINING PROGRAM

## 2019-02-10 RX ADMIN — ENOXAPARIN SODIUM 40 MG: 100 INJECTION SUBCUTANEOUS at 06:02

## 2019-02-10 RX ADMIN — MICONAZOLE NITRATE: 20 OINTMENT TOPICAL at 09:02

## 2019-02-10 RX ADMIN — PANTOPRAZOLE SODIUM 40 MG: 40 INJECTION, POWDER, LYOPHILIZED, FOR SOLUTION INTRAVENOUS at 08:02

## 2019-02-10 RX ADMIN — INSULIN ASPART 2 UNITS: 100 INJECTION, SOLUTION INTRAVENOUS; SUBCUTANEOUS at 11:02

## 2019-02-10 RX ADMIN — Medication 10 ML: at 06:02

## 2019-02-10 RX ADMIN — KETOROLAC TROMETHAMINE 15 MG: 15 INJECTION, SOLUTION INTRAMUSCULAR; INTRAVENOUS at 06:02

## 2019-02-10 RX ADMIN — SOYBEAN OIL 250 ML: 20 INJECTION, SOLUTION INTRAVENOUS at 10:02

## 2019-02-10 RX ADMIN — KETOROLAC TROMETHAMINE 15 MG: 15 INJECTION, SOLUTION INTRAMUSCULAR; INTRAVENOUS at 11:02

## 2019-02-10 RX ADMIN — ACETAMINOPHEN 1000 MG: 10 INJECTION, SOLUTION INTRAVENOUS at 06:02

## 2019-02-10 RX ADMIN — IPRATROPIUM BROMIDE AND ALBUTEROL SULFATE 3 ML: .5; 3 SOLUTION RESPIRATORY (INHALATION) at 03:02

## 2019-02-10 RX ADMIN — FLUTICASONE FUROATE AND VILANTEROL TRIFENATATE 1 PUFF: 100; 25 POWDER RESPIRATORY (INHALATION) at 09:02

## 2019-02-10 RX ADMIN — POTASSIUM CHLORIDE: 2 INJECTION, SOLUTION, CONCENTRATE INTRAVENOUS at 10:02

## 2019-02-10 RX ADMIN — Medication 10 ML: at 12:02

## 2019-02-10 RX ADMIN — CIPROFLOXACIN 400 MG: 2 INJECTION, SOLUTION INTRAVENOUS at 10:02

## 2019-02-10 RX ADMIN — PANTOPRAZOLE SODIUM 40 MG: 40 INJECTION, POWDER, LYOPHILIZED, FOR SOLUTION INTRAVENOUS at 09:02

## 2019-02-10 RX ADMIN — METRONIDAZOLE 500 MG: 500 INJECTION, SOLUTION INTRAVENOUS at 11:02

## 2019-02-10 RX ADMIN — CIPROFLOXACIN 400 MG: 2 INJECTION, SOLUTION INTRAVENOUS at 09:02

## 2019-02-10 RX ADMIN — INSULIN ASPART 2 UNITS: 100 INJECTION, SOLUTION INTRAVENOUS; SUBCUTANEOUS at 09:02

## 2019-02-10 RX ADMIN — IPRATROPIUM BROMIDE AND ALBUTEROL SULFATE 3 ML: .5; 3 SOLUTION RESPIRATORY (INHALATION) at 11:02

## 2019-02-10 RX ADMIN — HYDROMORPHONE HYDROCHLORIDE 1 MG: 1 INJECTION, SOLUTION INTRAMUSCULAR; INTRAVENOUS; SUBCUTANEOUS at 09:02

## 2019-02-10 RX ADMIN — INSULIN ASPART 2 UNITS: 100 INJECTION, SOLUTION INTRAVENOUS; SUBCUTANEOUS at 03:02

## 2019-02-10 RX ADMIN — HYDROMORPHONE HYDROCHLORIDE 1 MG: 1 INJECTION, SOLUTION INTRAMUSCULAR; INTRAVENOUS; SUBCUTANEOUS at 06:02

## 2019-02-10 RX ADMIN — HYDROMORPHONE HYDROCHLORIDE 1 MG: 1 INJECTION, SOLUTION INTRAMUSCULAR; INTRAVENOUS; SUBCUTANEOUS at 03:02

## 2019-02-10 RX ADMIN — MICONAZOLE NITRATE: 20 OINTMENT TOPICAL at 08:02

## 2019-02-10 RX ADMIN — METRONIDAZOLE 500 MG: 500 INJECTION, SOLUTION INTRAVENOUS at 02:02

## 2019-02-10 RX ADMIN — HYDROMORPHONE HYDROCHLORIDE 1 MG: 1 INJECTION, SOLUTION INTRAMUSCULAR; INTRAVENOUS; SUBCUTANEOUS at 02:02

## 2019-02-10 RX ADMIN — HYDROMORPHONE HYDROCHLORIDE 1 MG: 1 INJECTION, SOLUTION INTRAMUSCULAR; INTRAVENOUS; SUBCUTANEOUS at 08:02

## 2019-02-10 RX ADMIN — METRONIDAZOLE 500 MG: 500 INJECTION, SOLUTION INTRAVENOUS at 06:02

## 2019-02-10 NOTE — PLAN OF CARE
Problem: Adult Inpatient Plan of Care  Goal: Plan of Care Review  Outcome: Ongoing (interventions implemented as appropriate)  Pt is A&Ox4 injury free. Pt on bariatric air bed. Pair was controlled with IV pain medications, but the pt was only medacated with IV pain med's. PICC line flushed and pulled back with out any problems. When the pt urinated she used a bed pan. The pt did not have any b/m during night shift. Breathing was regular

## 2019-02-10 NOTE — ASSESSMENT & PLAN NOTE
62 yo female presenting with abdominal pain, nausea, elevated lactate s/p open drainage pelvic abscess 1/25     -NPO, failed swallow study  - will likely require PEG placement for dysphagia, family is hesitant to discuss surgical intervention/anesthesia   -TPN  -pain/nasuea meds PRN  -Continue broad spec abx - IV vanc, cipro, flagyl - estimated end date 2/26/19  -WOCN following, appreciate help  -DVT/GI prophylaxis  -PT/OT

## 2019-02-10 NOTE — NURSING
Dr.Kyle Grullon notified of pt refused NG tube placement. No new orders. Will continue to monitor closely.

## 2019-02-10 NOTE — PROGRESS NOTES
"[facility]  General Surgery  Progress Note    Subjective:     History of Present Illness:  64 yo W with a history of HTN, COPD on home oxygen, HFpEF, IDDMII, CVA on plavix, HTN, debility after fall and broken hip, and PE who presents to the ED with a several month history of nausea, vomiting, inability to tolerate a diet and weight loss. She has had multiple admissions in the past few months for different ailments. She presents today with continued nausea, vomiting and abdominal pain that is mostly worse in the RLQ. During the examination, she states her pain was all over her abdomen because she was retching so much. She states that she has lost close to 40lbs since her surgery and that she only able to keep broth down. She had a lap converted to open appendectomy back in August 2018 that was complicated by a wound infection, C diff and failure to thrive. This seems to persists. She is now wheelchair bound (per her) and apparently lives in [place] but is here in [place] with her daughter.    She had a CT scan in the ED which revealed an irregular shaped rim enhancing fluid collection measuring at least 4.0 x 3.0 cm ight hemipelvis at the site of prior appendectomy. Surgery was consulted for further recommendations. She was also noted to have a "knot" at the RLQ incision site that is also tender.    Post-Op Info:  Procedure(s) (LRB):  LAPAROTOMY, EXPLORATORY (N/A)  INCISION AND DRAINAGE, ABSCESS-  drainage of pelvic abscess (N/A)  EXCISION, ABSCESS- drainage abdominal wall abscess (N/A)  APPLICATION, WOUND VAC (N/A)   16 Days Post-Op     Interval History:  Patient seen and examined, no acute events overnight  Failed MBSS  Abdominal pain well controlled  +F/ 0 BM  Afebrile/VSS    Medications:  Continuous Infusions:   TPN ADULT CENTRAL LINE CUSTOM 50 mL/hr at 02/09/19 3869     Scheduled Meds:   aspirin  81 mg Oral Daily    ciprofloxacin  400 mg Intravenous Q12H    diltiaZEM  180 mg Oral Daily "    enoxaparin  40 mg Subcutaneous Daily    fat emulsion 20%  250 mL Intravenous Daily    fluticasone-vilanterol  1 puff Inhalation Daily    furosemide  40 mg Intravenous BID    ketorolac  15 mg Intravenous Q6H    metronidazole  500 mg Intravenous Q8H    miconazole nitrate 2%   Topical (Top) BID    pantoprozole (PROTONIX) IV  40 mg Intravenous Q12H    sodium chloride 0.9%  10 mL Intravenous Q6H     PRN Meds:albuterol sulfate, albuterol-ipratropium, dextrose 50%, diclofenac sodium, diphenhydrAMINE, glucagon (human recombinant), hydrALAZINE, HYDROmorphone, insulin aspart U-100, ondansetron, promethazine (PHENERGAN) IVPB, Flushing PICC Protocol **AND** sodium chloride 0.9% **AND** sodium chloride 0.9%, sodium chloride 0.9%, sodium chloride 0.9%, white petrolatum     Review of patient's allergies indicates:   Allergen Reactions    Ace inhibitors Swelling    Carvedilol      Other reaction(s): Other (See Comments)  blister    Hydralazine analogues     Metformin Nausea And Vomiting    Tetracycline Itching    Tetracyclines Swelling    Travatan (with benzalkonium) [travoprost (benzalkonium)]     Travoprost Itching     Other reaction(s): Other (See Comments)  Blurry vision     Objective:     Vital Signs (Most Recent):  Temp: 98.1 °F (36.7 °C) (02/10/19 0741)  Pulse: 80 (02/10/19 0741)  Resp: 16 (02/10/19 0741)  BP: (!) 122/94 (02/10/19 0741)  SpO2: 97 % (02/10/19 0741) Vital Signs (24h Range):  Temp:  [97.5 °F (36.4 °C)-98.5 °F (36.9 °C)] 98.1 °F (36.7 °C)  Pulse:  [] 80  Resp:  [16-22] 16  SpO2:  [96 %-100 %] 97 %  BP: ()/(51-94) 122/94     Weight: 75.4 kg (166 lb 3.6 oz)  Body mass index is 31.41 kg/m².    Intake/Output - Last 3 Shifts       02/08 0700 - 02/09 0659 02/09 0700 - 02/10 0659 02/10 0700 - 02/11 0659    P.O. 0 0     IV Piggyback 2250 900     TPN 1555.6 1352     Total Intake(mL/kg) 3805.6 (50.5) 2252 (29.9)     Urine (mL/kg/hr) 1500 (0.8)      Drains 30      Stool 0      Total Output  1530      Net +2275.6 +2252            Urine Occurrence 15 x 4 x     Stool Occurrence 0 x 0 x     Emesis Occurrence  0 x           Physical Exam   Constitutional: She appears well-developed and well-nourished. No distress.   HENT:   Head: Normocephalic and atraumatic.   Cardiovascular: Normal rate and regular rhythm.   Pulmonary/Chest: Effort normal. No respiratory distress.   Abdominal:   Soft, appropriate TTP  Open wounds covered with adequate dressing. No signs of infection. LUCIANO drain with serous output.        Significant Labs:  CBC:   Recent Labs   Lab 02/10/19  0400   WBC 8.48   RBC 2.40*   HGB 7.1*   HCT 22.2*   *   MCV 93   MCH 29.6   MCHC 32.0       CMP:   Recent Labs   Lab 02/10/19  0400   *   CALCIUM 8.8   ALBUMIN 1.4*   PROT 4.6*   *   K 4.2   CO2 25      BUN 37*   CREATININE 0.7   ALKPHOS 535*   ALT 27   AST 32   BILITOT 3.4*     LFTs:   Recent Labs   Lab 02/10/19  0400   ALT 27   AST 32   ALKPHOS 535*   BILITOT 3.4*   PROT 4.6*   ALBUMIN 1.4*     Assessment/Plan:     * Pelvic abscess in female    -S/p ex-lap with drainage of abscess. On antibiotics.  - Vanc completed.   4 weeks total of cipro flagyl      Dysarthria and anarthria    Discuss IR G tube placement.   Would need NGT placed prior to IR      Multiple skin tears    -Wound care following     Depression    -Hold home meds for now until tolerating po     Hypophosphatemia    -Replace as needed     Debility    -PT/OT; history of fall with non displaced oblique right tibia fracture at metaphysis into diaphysis. Was wearing brace.  -Severely debilited       Generalized abdominal pain    64 yo female presenting with abdominal pain, nausea, elevated lactate s/p open drainage pelvic abscess 1/25     -NPO, failed swallow study  - will likely require PEG placement for dysphagia, family is hesitant to discuss surgical intervention/anesthesia   -TPN  -pain/nasuea meds PRN  -Continue broad spec abx - IV vanc, cipro, flagyl - estimated  end date 2/26/19  -WOCN following, appreciate help  -DVT/GI prophylaxis  -PT/OT       Moderate malnutrition    -Continue TPN  -plan for PEG placement Monday     Essential hypertension    -diltiazem, lasix     Gastroesophageal reflux disease without esophagitis    -Continue BID IV protonix     (HFpEF) heart failure with preserved ejection fraction    Last echo 8/2018 with no WMAs, grade 1DD, EF 60%  -Strict I/O, daily weights  -Continue diuresis     Moderate asthma without complication    -Continue with scheduled duonebs     Elevated troponin    -Cardiology consulted. On ASA/plavix at home  -ASA     CAD (coronary artery disease)    -ASA     Type 2 diabetes mellitus    -SSI         Sudhakar López MD  General Surgery  [facility]

## 2019-02-10 NOTE — SUBJECTIVE & OBJECTIVE
Interval History:  Patient seen and examined, no acute events overnight  Failed MBSS  Abdominal pain well controlled  +F/ 0 BM  Afebrile/VSS    Medications:  Continuous Infusions:   TPN ADULT CENTRAL LINE CUSTOM 50 mL/hr at 02/09/19 2138     Scheduled Meds:   aspirin  81 mg Oral Daily    ciprofloxacin  400 mg Intravenous Q12H    diltiaZEM  180 mg Oral Daily    enoxaparin  40 mg Subcutaneous Daily    fat emulsion 20%  250 mL Intravenous Daily    fluticasone-vilanterol  1 puff Inhalation Daily    furosemide  40 mg Intravenous BID    ketorolac  15 mg Intravenous Q6H    metronidazole  500 mg Intravenous Q8H    miconazole nitrate 2%   Topical (Top) BID    pantoprozole (PROTONIX) IV  40 mg Intravenous Q12H    sodium chloride 0.9%  10 mL Intravenous Q6H     PRN Meds:albuterol sulfate, albuterol-ipratropium, dextrose 50%, diclofenac sodium, diphenhydrAMINE, glucagon (human recombinant), hydrALAZINE, HYDROmorphone, insulin aspart U-100, ondansetron, promethazine (PHENERGAN) IVPB, Flushing PICC Protocol **AND** sodium chloride 0.9% **AND** sodium chloride 0.9%, sodium chloride 0.9%, sodium chloride 0.9%, white petrolatum     Review of patient's allergies indicates:   Allergen Reactions    Ace inhibitors Swelling    Carvedilol      Other reaction(s): Other (See Comments)  blister    Hydralazine analogues     Metformin Nausea And Vomiting    Tetracycline Itching    Tetracyclines Swelling    Travatan (with benzalkonium) [travoprost (benzalkonium)]     Travoprost Itching     Other reaction(s): Other (See Comments)  Blurry vision     Objective:     Vital Signs (Most Recent):  Temp: 98.1 °F (36.7 °C) (02/10/19 0741)  Pulse: 80 (02/10/19 0741)  Resp: 16 (02/10/19 0741)  BP: (!) 122/94 (02/10/19 0741)  SpO2: 97 % (02/10/19 0741) Vital Signs (24h Range):  Temp:  [97.5 °F (36.4 °C)-98.5 °F (36.9 °C)] 98.1 °F (36.7 °C)  Pulse:  [] 80  Resp:  [16-22] 16  SpO2:  [96 %-100 %] 97 %  BP: ()/(51-94) 122/94      Weight: 75.4 kg (166 lb 3.6 oz)  Body mass index is 31.41 kg/m².    Intake/Output - Last 3 Shifts       02/08 0700 - 02/09 0659 02/09 0700 - 02/10 0659 02/10 0700 - 02/11 0659    P.O. 0 0     IV Piggyback 2250 900     TPN 1555.6 1352     Total Intake(mL/kg) 3805.6 (50.5) 2252 (29.9)     Urine (mL/kg/hr) 1500 (0.8)      Drains 30      Stool 0      Total Output 1530      Net +2275.6 +2252            Urine Occurrence 15 x 4 x     Stool Occurrence 0 x 0 x     Emesis Occurrence  0 x           Physical Exam   Constitutional: She appears well-developed and well-nourished. No distress.   HENT:   Head: Normocephalic and atraumatic.   Cardiovascular: Normal rate and regular rhythm.   Pulmonary/Chest: Effort normal. No respiratory distress.   Abdominal:   Soft, appropriate TTP  Open wounds covered with adequate dressing. No signs of infection. LUCIANO drain with serous output.        Significant Labs:  CBC:   Recent Labs   Lab 02/10/19  0400   WBC 8.48   RBC 2.40*   HGB 7.1*   HCT 22.2*   *   MCV 93   MCH 29.6   MCHC 32.0       CMP:   Recent Labs   Lab 02/10/19  0400   *   CALCIUM 8.8   ALBUMIN 1.4*   PROT 4.6*   *   K 4.2   CO2 25      BUN 37*   CREATININE 0.7   ALKPHOS 535*   ALT 27   AST 32   BILITOT 3.4*     LFTs:   Recent Labs   Lab 02/10/19  0400   ALT 27   AST 32   ALKPHOS 535*   BILITOT 3.4*   PROT 4.6*   ALBUMIN 1.4*

## 2019-02-11 ENCOUNTER — ANESTHESIA (OUTPATIENT)
Dept: SURGERY | Facility: HOSPITAL | Age: 64
End: 2019-02-11

## 2019-02-11 PROBLEM — D62 ACUTE BLOOD LOSS ANEMIA: Status: ACTIVE | Noted: 2019-02-11

## 2019-02-11 LAB
ABO + RH BLD: NORMAL
ALBUMIN SERPL BCP-MCNC: 1.4 G/DL
ALP SERPL-CCNC: 498 U/L
ALT SERPL W/O P-5'-P-CCNC: 21 U/L
ANION GAP SERPL CALC-SCNC: 6 MMOL/L
ANISOCYTOSIS BLD QL SMEAR: SLIGHT
AST SERPL-CCNC: 35 U/L
BASOPHILS # BLD AUTO: 0.03 K/UL
BASOPHILS NFR BLD: 0.3 %
BILIRUB SERPL-MCNC: 3.2 MG/DL
BLD GP AB SCN CELLS X3 SERPL QL: NORMAL
BLD PROD TYP BPU: NORMAL
BLD PROD TYP BPU: NORMAL
BLOOD UNIT EXPIRATION DATE: NORMAL
BLOOD UNIT EXPIRATION DATE: NORMAL
BLOOD UNIT TYPE CODE: 6200
BLOOD UNIT TYPE CODE: 6200
BLOOD UNIT TYPE: NORMAL
BLOOD UNIT TYPE: NORMAL
BUN SERPL-MCNC: 39 MG/DL
CA-I BLDV-SCNC: 1.26 MMOL/L
CALCIUM SERPL-MCNC: 9.1 MG/DL
CHLORIDE SERPL-SCNC: 106 MMOL/L
CO2 SERPL-SCNC: 24 MMOL/L
CODING SYSTEM: NORMAL
CODING SYSTEM: NORMAL
CREAT SERPL-MCNC: 0.7 MG/DL
DACRYOCYTES BLD QL SMEAR: ABNORMAL
DIFFERENTIAL METHOD: ABNORMAL
DISPENSE STATUS: NORMAL
DISPENSE STATUS: NORMAL
EOSINOPHIL # BLD AUTO: 1 K/UL
EOSINOPHIL NFR BLD: 9.4 %
ERYTHROCYTE [DISTWIDTH] IN BLOOD BY AUTOMATED COUNT: 19 %
EST. GFR  (AFRICAN AMERICAN): >60 ML/MIN/1.73 M^2
EST. GFR  (NON AFRICAN AMERICAN): >60 ML/MIN/1.73 M^2
GLUCOSE SERPL-MCNC: 169 MG/DL
HCT VFR BLD AUTO: 21.6 %
HGB BLD-MCNC: 6.8 G/DL
HYPOCHROMIA BLD QL SMEAR: ABNORMAL
IMM GRANULOCYTES # BLD AUTO: 0.06 K/UL
IMM GRANULOCYTES NFR BLD AUTO: 0.6 %
INR PPP: 1.1
LYMPHOCYTES # BLD AUTO: 2.2 K/UL
LYMPHOCYTES NFR BLD: 21.4 %
MAGNESIUM SERPL-MCNC: 2.4 MG/DL
MCH RBC QN AUTO: 29.8 PG
MCHC RBC AUTO-ENTMCNC: 31.5 G/DL
MCV RBC AUTO: 95 FL
MONOCYTES # BLD AUTO: 0.8 K/UL
MONOCYTES NFR BLD: 8.3 %
NEUTROPHILS # BLD AUTO: 6.1 K/UL
NEUTROPHILS NFR BLD: 60 %
NRBC BLD-RTO: 0 /100 WBC
OVALOCYTES BLD QL SMEAR: ABNORMAL
PHOSPHATE SERPL-MCNC: 3.8 MG/DL
PLATELET # BLD AUTO: 127 K/UL
PMV BLD AUTO: 13.1 FL
POCT GLUCOSE: 187 MG/DL (ref 70–110)
POCT GLUCOSE: 224 MG/DL (ref 70–110)
POIKILOCYTOSIS BLD QL SMEAR: SLIGHT
POLYCHROMASIA BLD QL SMEAR: ABNORMAL
POTASSIUM SERPL-SCNC: 4.3 MMOL/L
PROT SERPL-MCNC: 5.4 G/DL
PROTHROMBIN TIME: 11.4 SEC
RBC # BLD AUTO: 2.28 M/UL
SODIUM SERPL-SCNC: 136 MMOL/L
TARGETS BLD QL SMEAR: ABNORMAL
TRANS ERYTHROCYTES VOL PATIENT: NORMAL ML
TRANS ERYTHROCYTES VOL PATIENT: NORMAL ML
WBC # BLD AUTO: 10.09 K/UL

## 2019-02-11 PROCEDURE — B4185 PARENTERAL SOL 10 GM LIPIDS: HCPCS | Performed by: PHYSICIAN ASSISTANT

## 2019-02-11 PROCEDURE — 94664 DEMO&/EVAL PT USE INHALER: CPT

## 2019-02-11 PROCEDURE — 25000003 PHARM REV CODE 250: Performed by: SURGERY

## 2019-02-11 PROCEDURE — 63600175 PHARM REV CODE 636 W HCPCS: Performed by: PHYSICIAN ASSISTANT

## 2019-02-11 PROCEDURE — 63600175 PHARM REV CODE 636 W HCPCS: Performed by: SURGERY

## 2019-02-11 PROCEDURE — 25000003 PHARM REV CODE 250: Performed by: STUDENT IN AN ORGANIZED HEALTH CARE EDUCATION/TRAINING PROGRAM

## 2019-02-11 PROCEDURE — A4217 STERILE WATER/SALINE, 500 ML: HCPCS | Performed by: PHYSICIAN ASSISTANT

## 2019-02-11 PROCEDURE — 63600175 PHARM REV CODE 636 W HCPCS: Performed by: STUDENT IN AN ORGANIZED HEALTH CARE EDUCATION/TRAINING PROGRAM

## 2019-02-11 PROCEDURE — 25000242 PHARM REV CODE 250 ALT 637 W/ HCPCS: Performed by: STUDENT IN AN ORGANIZED HEALTH CARE EDUCATION/TRAINING PROGRAM

## 2019-02-11 PROCEDURE — 94761 N-INVAS EAR/PLS OXIMETRY MLT: CPT

## 2019-02-11 PROCEDURE — 80053 COMPREHEN METABOLIC PANEL: CPT

## 2019-02-11 PROCEDURE — 63600175 PHARM REV CODE 636 W HCPCS: Performed by: RADIOLOGY

## 2019-02-11 PROCEDURE — S0030 INJECTION, METRONIDAZOLE: HCPCS | Performed by: SURGERY

## 2019-02-11 PROCEDURE — 25000003 PHARM REV CODE 250: Performed by: PHYSICIAN ASSISTANT

## 2019-02-11 PROCEDURE — 84100 ASSAY OF PHOSPHORUS: CPT

## 2019-02-11 PROCEDURE — 86901 BLOOD TYPING SEROLOGIC RH(D): CPT

## 2019-02-11 PROCEDURE — 20600001 HC STEP DOWN PRIVATE ROOM

## 2019-02-11 PROCEDURE — P9021 RED BLOOD CELLS UNIT: HCPCS

## 2019-02-11 PROCEDURE — A4216 STERILE WATER/SALINE, 10 ML: HCPCS | Performed by: SURGERY

## 2019-02-11 PROCEDURE — C9113 INJ PANTOPRAZOLE SODIUM, VIA: HCPCS | Performed by: PHYSICIAN ASSISTANT

## 2019-02-11 PROCEDURE — 94640 AIRWAY INHALATION TREATMENT: CPT

## 2019-02-11 PROCEDURE — 97110 THERAPEUTIC EXERCISES: CPT

## 2019-02-11 PROCEDURE — 82330 ASSAY OF CALCIUM: CPT

## 2019-02-11 PROCEDURE — 99900035 HC TECH TIME PER 15 MIN (STAT)

## 2019-02-11 PROCEDURE — 85025 COMPLETE CBC W/AUTO DIFF WBC: CPT

## 2019-02-11 PROCEDURE — 27000221 HC OXYGEN, UP TO 24 HOURS

## 2019-02-11 PROCEDURE — 83735 ASSAY OF MAGNESIUM: CPT

## 2019-02-11 PROCEDURE — 85610 PROTHROMBIN TIME: CPT

## 2019-02-11 PROCEDURE — 86920 COMPATIBILITY TEST SPIN: CPT

## 2019-02-11 RX ORDER — CEFAZOLIN SODIUM 1 G/3ML
1 INJECTION, POWDER, FOR SOLUTION INTRAMUSCULAR; INTRAVENOUS ONCE
Status: COMPLETED | OUTPATIENT
Start: 2019-02-11 | End: 2019-02-11

## 2019-02-11 RX ORDER — GLUCAGON 1 MG
1 KIT INJECTION ONCE
Status: DISCONTINUED | OUTPATIENT
Start: 2019-02-11 | End: 2019-02-19

## 2019-02-11 RX ORDER — MIDAZOLAM HYDROCHLORIDE 1 MG/ML
INJECTION INTRAMUSCULAR; INTRAVENOUS CODE/TRAUMA/SEDATION MEDICATION
Status: COMPLETED | OUTPATIENT
Start: 2019-02-11 | End: 2019-02-11

## 2019-02-11 RX ORDER — CIPROFLOXACIN 500 MG/1
500 TABLET ORAL EVERY 12 HOURS
Status: DISCONTINUED | OUTPATIENT
Start: 2019-02-11 | End: 2019-02-19

## 2019-02-11 RX ORDER — METRONIDAZOLE 500 MG/1
500 TABLET ORAL EVERY 8 HOURS
Status: DISCONTINUED | OUTPATIENT
Start: 2019-02-11 | End: 2019-02-19

## 2019-02-11 RX ORDER — FENTANYL CITRATE 50 UG/ML
INJECTION, SOLUTION INTRAMUSCULAR; INTRAVENOUS CODE/TRAUMA/SEDATION MEDICATION
Status: COMPLETED | OUTPATIENT
Start: 2019-02-11 | End: 2019-02-11

## 2019-02-11 RX ADMIN — CIPROFLOXACIN HYDROCHLORIDE 500 MG: 500 TABLET, FILM COATED ORAL at 10:02

## 2019-02-11 RX ADMIN — PANTOPRAZOLE SODIUM 40 MG: 40 INJECTION, POWDER, LYOPHILIZED, FOR SOLUTION INTRAVENOUS at 02:02

## 2019-02-11 RX ADMIN — KETOROLAC TROMETHAMINE 15 MG: 15 INJECTION, SOLUTION INTRAMUSCULAR; INTRAVENOUS at 12:02

## 2019-02-11 RX ADMIN — FUROSEMIDE 40 MG: 10 INJECTION, SOLUTION INTRAVENOUS at 02:02

## 2019-02-11 RX ADMIN — ENOXAPARIN SODIUM 40 MG: 100 INJECTION SUBCUTANEOUS at 05:02

## 2019-02-11 RX ADMIN — KETOROLAC TROMETHAMINE 15 MG: 15 INJECTION, SOLUTION INTRAMUSCULAR; INTRAVENOUS at 06:02

## 2019-02-11 RX ADMIN — KETOROLAC TROMETHAMINE 15 MG: 15 INJECTION, SOLUTION INTRAMUSCULAR; INTRAVENOUS at 11:02

## 2019-02-11 RX ADMIN — INSULIN ASPART 1 UNITS: 100 INJECTION, SOLUTION INTRAVENOUS; SUBCUTANEOUS at 12:02

## 2019-02-11 RX ADMIN — MICONAZOLE NITRATE: 20 OINTMENT TOPICAL at 11:02

## 2019-02-11 RX ADMIN — HYDROMORPHONE HYDROCHLORIDE 1 MG: 1 INJECTION, SOLUTION INTRAMUSCULAR; INTRAVENOUS; SUBCUTANEOUS at 05:02

## 2019-02-11 RX ADMIN — FENTANYL CITRATE 50 MCG: 50 INJECTION, SOLUTION INTRAMUSCULAR; INTRAVENOUS at 10:02

## 2019-02-11 RX ADMIN — KETOROLAC TROMETHAMINE 15 MG: 15 INJECTION, SOLUTION INTRAMUSCULAR; INTRAVENOUS at 05:02

## 2019-02-11 RX ADMIN — MICONAZOLE NITRATE: 20 OINTMENT TOPICAL at 02:02

## 2019-02-11 RX ADMIN — CEFAZOLIN 1 G: 330 INJECTION, POWDER, FOR SOLUTION INTRAMUSCULAR; INTRAVENOUS at 10:02

## 2019-02-11 RX ADMIN — HYDROMORPHONE HYDROCHLORIDE 1 MG: 1 INJECTION, SOLUTION INTRAMUSCULAR; INTRAVENOUS; SUBCUTANEOUS at 04:02

## 2019-02-11 RX ADMIN — IPRATROPIUM BROMIDE AND ALBUTEROL SULFATE 3 ML: .5; 3 SOLUTION RESPIRATORY (INHALATION) at 12:02

## 2019-02-11 RX ADMIN — DILTIAZEM HYDROCHLORIDE 180 MG: 180 CAPSULE, COATED, EXTENDED RELEASE ORAL at 02:02

## 2019-02-11 RX ADMIN — METRONIDAZOLE 500 MG: 500 INJECTION, SOLUTION INTRAVENOUS at 04:02

## 2019-02-11 RX ADMIN — Medication 10 ML: at 05:02

## 2019-02-11 RX ADMIN — METRONIDAZOLE 500 MG: 500 TABLET ORAL at 02:02

## 2019-02-11 RX ADMIN — ONDANSETRON 4 MG: 2 INJECTION INTRAMUSCULAR; INTRAVENOUS at 05:02

## 2019-02-11 RX ADMIN — HYDROMORPHONE HYDROCHLORIDE 1 MG: 1 INJECTION, SOLUTION INTRAMUSCULAR; INTRAVENOUS; SUBCUTANEOUS at 12:02

## 2019-02-11 RX ADMIN — PANTOPRAZOLE SODIUM 40 MG: 40 INJECTION, POWDER, LYOPHILIZED, FOR SOLUTION INTRAVENOUS at 10:02

## 2019-02-11 RX ADMIN — SOYBEAN OIL 250 ML: 20 INJECTION, SOLUTION INTRAVENOUS at 11:02

## 2019-02-11 RX ADMIN — Medication 10 ML: at 12:02

## 2019-02-11 RX ADMIN — CIPROFLOXACIN HYDROCHLORIDE 500 MG: 500 TABLET, FILM COATED ORAL at 02:02

## 2019-02-11 RX ADMIN — MIDAZOLAM HYDROCHLORIDE 1 MG: 1 INJECTION, SOLUTION INTRAMUSCULAR; INTRAVENOUS at 10:02

## 2019-02-11 RX ADMIN — ASPIRIN 81 MG CHEWABLE TABLET 81 MG: 81 TABLET CHEWABLE at 02:02

## 2019-02-11 RX ADMIN — Medication 10 ML: at 06:02

## 2019-02-11 RX ADMIN — Medication 10 ML: at 02:02

## 2019-02-11 RX ADMIN — METRONIDAZOLE 500 MG: 500 TABLET ORAL at 10:02

## 2019-02-11 RX ADMIN — HYDROMORPHONE HYDROCHLORIDE 1 MG: 1 INJECTION, SOLUTION INTRAMUSCULAR; INTRAVENOUS; SUBCUTANEOUS at 11:02

## 2019-02-11 RX ADMIN — KETOROLAC TROMETHAMINE 15 MG: 15 INJECTION, SOLUTION INTRAMUSCULAR; INTRAVENOUS at 02:02

## 2019-02-11 RX ADMIN — HYDROMORPHONE HYDROCHLORIDE 1 MG: 1 INJECTION, SOLUTION INTRAMUSCULAR; INTRAVENOUS; SUBCUTANEOUS at 02:02

## 2019-02-11 RX ADMIN — FLUTICASONE FUROATE AND VILANTEROL TRIFENATATE 1 PUFF: 100; 25 POWDER RESPIRATORY (INHALATION) at 02:02

## 2019-02-11 RX ADMIN — POTASSIUM CHLORIDE: 2 INJECTION, SOLUTION, CONCENTRATE INTRAVENOUS at 11:02

## 2019-02-11 NOTE — PLAN OF CARE
Problem: Adult Inpatient Plan of Care  Goal: Plan of Care Review  Outcome: Ongoing (interventions implemented as appropriate)  Pt is alert and oriented x 4 injury free. Specialty air bed with in normal range. NG tube 16 fr was placed in right aguilera and 450 ml of contrast given thru the NG tube. Labs were drawn from the picc for morning labs. TPN and Lipids infused during night shift. Pain medication given IV when pt requested it.

## 2019-02-11 NOTE — PT/OT/SLP PROGRESS
Speech Language Pathology  Patient Not Seen      Sheryl Martines  MRN: [id number]    Patient not seen today secondary to patient to have PEG placed. Will follow-up at next scheduled treatment session.     Emily P. Abadie M.S., CCC-SLP  Speech Language Pathologist  [phone]  02/11/2019

## 2019-02-11 NOTE — PROGRESS NOTES
"[facility]  General Surgery  Progress Note    Subjective:     History of Present Illness:  64 yo W with a history of HTN, COPD on home oxygen, HFpEF, IDDMII, CVA on plavix, HTN, debility after fall and broken hip, and PE who presents to the ED with a several month history of nausea, vomiting, inability to tolerate a diet and weight loss. She has had multiple admissions in the past few months for different ailments. She presents today with continued nausea, vomiting and abdominal pain that is mostly worse in the RLQ. During the examination, she states her pain was all over her abdomen because she was retching so much. She states that she has lost close to 40lbs since her surgery and that she only able to keep broth down. She had a lap converted to open appendectomy back in August 2018 that was complicated by a wound infection, C diff and failure to thrive. This seems to persists. She is now wheelchair bound (per her) and apparently lives in [place] but is here in [place] with her daughter.    She had a CT scan in the ED which revealed an irregular shaped rim enhancing fluid collection measuring at least 4.0 x 3.0 cm ight hemipelvis at the site of prior appendectomy. Surgery was consulted for further recommendations. She was also noted to have a "knot" at the RLQ incision site that is also tender.    Post-Op Info:  Procedure(s) (LRB):  LAPAROTOMY, EXPLORATORY (N/A)  INCISION AND DRAINAGE, ABSCESS-  drainage of pelvic abscess (N/A)  EXCISION, ABSCESS- drainage abdominal wall abscess (N/A)  APPLICATION, WOUND VAC (N/A)   17 Days Post-Op     Interval History:  Patient seen and examined, no acute events overnight  Has been NPO for IR PEG placement  Abdominal pain well controlled  +F/no BM  LUCIANO drain put out 0mL overnight  Afebrile/VSS    Medications:  Continuous Infusions:   TPN ADULT CENTRAL LINE CUSTOM 50 mL/hr at 02/10/19 2226     Scheduled Meds:   aspirin  81 mg Oral Daily    ciprofloxacin  400 " mg Intravenous Q12H    diltiaZEM  180 mg Oral Daily    enoxaparin  40 mg Subcutaneous Daily    fat emulsion 20%  250 mL Intravenous Daily    fluticasone-vilanterol  1 puff Inhalation Daily    furosemide  40 mg Intravenous BID    ketorolac  15 mg Intravenous Q6H    metronidazole  500 mg Intravenous Q8H    miconazole nitrate 2%   Topical (Top) BID    pantoprozole (PROTONIX) IV  40 mg Intravenous Q12H    sodium chloride 0.9%  10 mL Intravenous Q6H     PRN Meds:albuterol sulfate, albuterol-ipratropium, dextrose 50%, diclofenac sodium, diphenhydrAMINE, glucagon (human recombinant), hydrALAZINE, HYDROmorphone, insulin aspart U-100, ondansetron, promethazine (PHENERGAN) IVPB, Flushing PICC Protocol **AND** sodium chloride 0.9% **AND** sodium chloride 0.9%, sodium chloride 0.9%, sodium chloride 0.9%, white petrolatum     Review of patient's allergies indicates:   Allergen Reactions    Ace inhibitors Swelling    Carvedilol      Other reaction(s): Other (See Comments)  blister    Hydralazine analogues     Metformin Nausea And Vomiting    Tetracycline Itching    Tetracyclines Swelling    Travatan (with benzalkonium) [travoprost (benzalkonium)]     Travoprost Itching     Other reaction(s): Other (See Comments)  Blurry vision     Objective:     Vital Signs (Most Recent):  Temp: 98.5 °F (36.9 °C) (02/11/19 0356)  Pulse: 103 (02/11/19 0356)  Resp: 18 (02/11/19 0356)  BP: 127/73 (02/11/19 0356)  SpO2: (!) 94 % (02/11/19 0356) Vital Signs (24h Range):  Temp:  [97.9 °F (36.6 °C)-98.5 °F (36.9 °C)] 98.5 °F (36.9 °C)  Pulse:  [] 103  Resp:  [16-18] 18  SpO2:  [94 %-100 %] 94 %  BP: (109-133)/(56-94) 127/73     Weight: 75.4 kg (166 lb 3.6 oz)  Body mass index is 31.41 kg/m².    Intake/Output - Last 3 Shifts       02/09 0700 - 02/10 0659 02/10 0700 - 02/11 0659 02/11 0700 - 02/12 0659    P.O. 0 0     IV Piggyback 900 700     TPN 1352 1359.1     Total Intake(mL/kg) 2252 (29.9) 2059.1 (27.3)     Urine (mL/kg/hr)  0  (0)     Emesis/NG output  0     Drains  0     Other  0     Stool  0     Total Output  0     Net +2252 +2059.1            Urine Occurrence 4 x 2 x     Stool Occurrence 0 x 0 x     Emesis Occurrence 0 x 0 x           Physical Exam   Constitutional: She appears well-developed and well-nourished. No distress.   HENT:   Head: Normocephalic and atraumatic.   Cardiovascular: Normal rate and regular rhythm.   Pulmonary/Chest: Effort normal. No respiratory distress.   Abdominal:   Soft, appropriate TTP  Open wounds covered with adequate dressing. No signs of infection. LUCIANO drain with serous output.        Significant Labs:  CBC:   Recent Labs   Lab 02/11/19 0422   WBC 10.09   RBC 2.28*   HGB 6.8*   HCT 21.6*   *   MCV 95   MCH 29.8   MCHC 31.5*       CMP:   Recent Labs   Lab 02/11/19 0422   *   CALCIUM 9.1   ALBUMIN 1.4*   PROT 5.4*      K 4.3   CO2 24      BUN 39*   CREATININE 0.7   ALKPHOS 498*   ALT 21   AST 35   BILITOT 3.2*     LFTs:   Recent Labs   Lab 02/11/19 0422   ALT 21   AST 35   ALKPHOS 498*   BILITOT 3.2*   PROT 5.4*   ALBUMIN 1.4*     Assessment/Plan:     * Pelvic abscess in female    -S/p ex-lap with drainage of abscess. On antibiotics.  - Vanc completed.   4 weeks total of cipro flagyl      Acute blood loss anemia    2 prbc 2/11/19     Dysarthria and anarthria    Discuss IR G tube placement.   NGT placed 2/10/19      Multiple skin tears    -Wound care following     Depression    -Hold home meds for now until tolerating po     Hypophosphatemia    -Replace as needed     Debility    -PT/OT; history of fall with non displaced oblique right tibia fracture at metaphysis into diaphysis. Was wearing brace.  -Severely debilited       Generalized abdominal pain    64 yo female presenting with abdominal pain, nausea, elevated lactate s/p open drainage pelvic abscess 1/25     -NPO, failed swallow study  -will likely require PEG placement for dysphagia, plan for IR PEG placement today  -wean  TPN  -d/c LUCIANO drain  -pain/nasuea meds PRN  -Continue broad spec abx - IV cipro, flagyl - estimated end date 2/26/19  -WOCN following, appreciate help  -DVT/GI prophylaxis  -PT/OT       Moderate malnutrition    -Continue TPN, wean TPN tomorrow  -plan for IR PEG placement Monday     Essential hypertension    -diltiazem, lasix     Gastroesophageal reflux disease without esophagitis    -Continue BID IV protonix     (HFpEF) heart failure with preserved ejection fraction    Last echo 8/2018 with no WMAs, grade 1DD, EF 60%  -Strict I/O, daily weights  -Continue diuresis     Moderate asthma without complication    -Continue with scheduled duonebs     Elevated troponin    -Cardiology consulted. On ASA/plavix at home  -ASA     CAD (coronary artery disease)    -ASA  -restart plavix tomorrow     Type 2 diabetes mellitus    -SSI         Nunu Cee PA-C   [id number]  General Surgery  [facility]

## 2019-02-11 NOTE — PLAN OF CARE
Problem: Adult Inpatient Plan of Care  Goal: Plan of Care Review      Recommendations    Recommendation/Intervention:     1. Continue current TPN + IV lipids - meeting needs.     2. When medically able, recommend initiating TF of Glucerna 1.5 advancing to goal rate of 45 mL/hr to provide 1620 kcal, 89 gm protein, and 820 mL free fluid.    - If bolus desired, recommend TF of Glucerna 1.5 x 4.5 cans/day to provide 1602 kcal, 88 gm protein, and 810 mL free fluid.     3. When able to advance diet, ADAT to Diabetic with texture per SLP.    4. RD following.       Goals: Patient to meet > 85% EEN and EPN  Nutrition Goal Status: goal met

## 2019-02-11 NOTE — PROGRESS NOTES
Pt  g tube procedure complete. Abdomen site CDI. VSS, No acute distress noted, Pt to [facility] [place] 5. Report given to GILBERT Thao. Report called to GILBERT Chan.

## 2019-02-11 NOTE — PLAN OF CARE
Problem: Physical Therapy Goal  Goal: Physical Therapy Goal  Goals to be met by: 19    Patient will increase functional independence with mobility by performin. Supine to sit with Moderate Assistance -not met  2. Sit to stand transfer with Maximum Assistance -not met  3. Bed to chair transfer with Maximum Assistance -not met  4. Sitting at edge of bed x10 minutes with Contact Guard Assistance -not met  5. Lower extremity exercise program x10 reps, with assistance as needed - met 2/4      Goals updated for time frame and are appropriate. Tita Laws, PT 2019

## 2019-02-11 NOTE — PLAN OF CARE
ION learned that Pt will be going to IR today for PEG placement. SW initiated referral to [facility] on Friday. Pt is currently on IV Cipro and Flagyl.  ION spoke to Cathy with [facility] ([id number]) about referral. Cathy asked if the plan was to switch Pt from IV to PO antibiotics. ION also said Pt worked with therapy, but refused to ambulate. ION spoke to Surgery PA, Nunu Cee, about antibiotics and it seems Pt will be able to switched from IV to PO. Pt is scheduled to work with therapy today and Cathy said she will look at Pt's notes to see if she has been more willing to walk, etc. The hope is that Pt will be ready to transfer to [facility] tomorrow.     Erma Godinez, MAURILIO        02/11/19 1030   Post-Acute Status   Post-Acute Authorization Placement   Post-Acute Placement Status Pending Medical Review

## 2019-02-11 NOTE — ASSESSMENT & PLAN NOTE
64 yo female presenting with abdominal pain, nausea, elevated lactate s/p open drainage pelvic abscess 1/25     -NPO, failed swallow study  -will likely require PEG placement for dysphagia, plan for IR PEG placement today  -wean TPN  -d/c LUCIANO drain  -pain/nasuea meds PRN  -Continue broad spec abx - IV cipro, flagyl - estimated end date 2/26/19  -WOCN following, appreciate help  -DVT/GI prophylaxis  -PT/OT

## 2019-02-11 NOTE — PROGRESS NOTES
Pt arrived IR  189 for G-tube placement. No acute distress noted. Orders, labs and consents reviewed.MD notified.

## 2019-02-11 NOTE — SUBJECTIVE & OBJECTIVE
Interval History:  Patient seen and examined, no acute events overnight  Has been NPO for IR PEG placement  Abdominal pain well controlled  +F/no BM  LUCIANO drain put out 0mL overnight  Afebrile/VSS    Medications:  Continuous Infusions:   TPN ADULT CENTRAL LINE CUSTOM 50 mL/hr at 02/10/19 2226     Scheduled Meds:   aspirin  81 mg Oral Daily    ciprofloxacin  400 mg Intravenous Q12H    diltiaZEM  180 mg Oral Daily    enoxaparin  40 mg Subcutaneous Daily    fat emulsion 20%  250 mL Intravenous Daily    fluticasone-vilanterol  1 puff Inhalation Daily    furosemide  40 mg Intravenous BID    ketorolac  15 mg Intravenous Q6H    metronidazole  500 mg Intravenous Q8H    miconazole nitrate 2%   Topical (Top) BID    pantoprozole (PROTONIX) IV  40 mg Intravenous Q12H    sodium chloride 0.9%  10 mL Intravenous Q6H     PRN Meds:albuterol sulfate, albuterol-ipratropium, dextrose 50%, diclofenac sodium, diphenhydrAMINE, glucagon (human recombinant), hydrALAZINE, HYDROmorphone, insulin aspart U-100, ondansetron, promethazine (PHENERGAN) IVPB, Flushing PICC Protocol **AND** sodium chloride 0.9% **AND** sodium chloride 0.9%, sodium chloride 0.9%, sodium chloride 0.9%, white petrolatum     Review of patient's allergies indicates:   Allergen Reactions    Ace inhibitors Swelling    Carvedilol      Other reaction(s): Other (See Comments)  blister    Hydralazine analogues     Metformin Nausea And Vomiting    Tetracycline Itching    Tetracyclines Swelling    Travatan (with benzalkonium) [travoprost (benzalkonium)]     Travoprost Itching     Other reaction(s): Other (See Comments)  Blurry vision     Objective:     Vital Signs (Most Recent):  Temp: 98.5 °F (36.9 °C) (02/11/19 0356)  Pulse: 103 (02/11/19 0356)  Resp: 18 (02/11/19 0356)  BP: 127/73 (02/11/19 0356)  SpO2: (!) 94 % (02/11/19 0356) Vital Signs (24h Range):  Temp:  [97.9 °F (36.6 °C)-98.5 °F (36.9 °C)] 98.5 °F (36.9 °C)  Pulse:  [] 103  Resp:  [16-18]  18  SpO2:  [94 %-100 %] 94 %  BP: (109-133)/(56-94) 127/73     Weight: 75.4 kg (166 lb 3.6 oz)  Body mass index is 31.41 kg/m².    Intake/Output - Last 3 Shifts       02/09 0700 - 02/10 0659 02/10 0700 - 02/11 0659 02/11 0700 - 02/12 0659    P.O. 0 0     IV Piggyback 900 700     TPN 1352 1359.1     Total Intake(mL/kg) 2252 (29.9) 2059.1 (27.3)     Urine (mL/kg/hr)  0 (0)     Emesis/NG output  0     Drains  0     Other  0     Stool  0     Total Output  0     Net +2252 +2059.1            Urine Occurrence 4 x 2 x     Stool Occurrence 0 x 0 x     Emesis Occurrence 0 x 0 x           Physical Exam   Constitutional: She appears well-developed and well-nourished. No distress.   HENT:   Head: Normocephalic and atraumatic.   Cardiovascular: Normal rate and regular rhythm.   Pulmonary/Chest: Effort normal. No respiratory distress.   Abdominal:   Soft, appropriate TTP  Open wounds covered with adequate dressing. No signs of infection. LUCIANO drain with serous output.        Significant Labs:  CBC:   Recent Labs   Lab 02/11/19 0422   WBC 10.09   RBC 2.28*   HGB 6.8*   HCT 21.6*   *   MCV 95   MCH 29.8   MCHC 31.5*       CMP:   Recent Labs   Lab 02/11/19 0422   *   CALCIUM 9.1   ALBUMIN 1.4*   PROT 5.4*      K 4.3   CO2 24      BUN 39*   CREATININE 0.7   ALKPHOS 498*   ALT 21   AST 35   BILITOT 3.2*     LFTs:   Recent Labs   Lab 02/11/19  0422   ALT 21   AST 35   ALKPHOS 498*   BILITOT 3.2*   PROT 5.4*   ALBUMIN 1.4*

## 2019-02-11 NOTE — CARE UPDATE
BG goal 140-180     Patient has a history of DM  Remains NPO on TPN  Plan for PEG today  BG fairly well controlled with correction scale insulin administration  Continue moderate dose correction scale with BG monitoring q 4 hours     Endocrine to continue to follow     ** Please call Endocrine for any BG related issues **

## 2019-02-11 NOTE — PT/OT/SLP PROGRESS
Physical Therapy Treatment    Patient Name:  Sheryl Martines   MRN:  [id number]    Recommendations:     Discharge Recommendations:  nursing facility, skilled(in NH)   Discharge Equipment Recommendations: none   Barriers to discharge: Decreased caregiver support family may not be able to assist pt at current functional level.     Assessment:     Sheryl Martines is a 63 y.o. female admitted with a medical diagnosis of Pelvic abscess in female.  She presents with the following impairments/functional limitations:  weakness, impaired functional mobilty, gait instability, impaired endurance, impaired balance, decreased lower extremity function pt ani treatment well but was very sleepy from procedure. Pt will benefit from cont skilled PT 2x/wk to progress physically and will need SNF placement in NH to maximize rehab potential.  Pt is s/p exp lap, I&D abscess, drainage abdominal wall abscess, wound vac placement 1/25, PEG placement 2/11/19.    Rehab Prognosis: Fair; patient would benefit from acute skilled PT services to address these deficits and reach maximum level of function.    Recent Surgery: Procedure(s) (LRB):  LAPAROTOMY, EXPLORATORY (N/A)  INCISION AND DRAINAGE, ABSCESS-  drainage of pelvic abscess (N/A)  EXCISION, ABSCESS- drainage abdominal wall abscess (N/A)  APPLICATION, WOUND VAC (N/A) 17 Days Post-Op    Plan:     During this hospitalization, patient to be seen 2 x/week to address the identified rehab impairments via therapeutic activities, therapeutic exercises, neuromuscular re-education and progress toward the following goals:    · Plan of Care Expires:  02/24/19    Subjective     Chief Complaint: pt c/o pain during treatment.   Patient/Family Comments/goals: to get better and go home.   Pain/Comfort:  · Location 1: (R leg -9, L leg-6)  · Pain Addressed 1: Nurse notified      Objective:     Communicated with nurse prior to session.  Patient found supine with  oxygen, PICC line, NG tube, PEG Tube(nieves to  abdomen)  upon PT entry to room.     General Precautions: Standard, fall, aspiration   Orthopedic Precautions:    Braces:       Functional Mobility:  · Not tested. Pt was lethargic from procedure and in pain.       AM-PAC 6 CLICK MOBILITY  Turning over in bed (including adjusting bedclothes, sheets and blankets)?: 2  Sitting down on and standing up from a chair with arms (e.g., wheelchair, bedside commode, etc.): 1  Moving from lying on back to sitting on the side of the bed?: 2  Moving to and from a bed to a chair (including a wheelchair)?: 1  Need to walk in hospital room?: 1  Climbing 3-5 steps with a railing?: 1  Basic Mobility Total Score: 8       Therapeutic Activities and Exercises:   pt performed AAROM there exer x 4 extremities x 15 reps in supine.     Patient left supine with all lines intact, call button in reach and daughter present..    GOALS:   Multidisciplinary Problems     Physical Therapy Goals        Problem: Physical Therapy Goal    Goal Priority Disciplines Outcome Goal Variances Interventions   Physical Therapy Goal     PT, PT/OT      Description:  Goals to be met by: 19    Patient will increase functional independence with mobility by performin. Supine to sit with Moderate Assistance -not met  2. Sit to stand transfer with Maximum Assistance -not met  3. Bed to chair transfer with Maximum Assistance -not met  4. Sitting at edge of bed x10 minutes with Contact Guard Assistance -not met  5. Lower extremity exercise program x10 reps, with assistance as needed - met 2/4                        Time Tracking:     PT Received On: 19  PT Start Time: 1405     PT Stop Time: 1425  PT Total Time (min): 20 min     Billable Minutes: Therapeutic Exercise 20 min    Treatment Type: Treatment  PT/PTA: PT     PTA Visit Number: 0     Tita Laws, PT  2019

## 2019-02-11 NOTE — PROCEDURES
Radiology Post-Procedure Note    Pre Op Diagnosis: Dysphagia  Post Op Diagnosis: Same    Procedure: G-tube placement    Procedure performed by: Diogo Lewis MD    Written Informed Consent Obtained: Yes  Specimen Removed: NO  Estimated Blood Loss: Minimal    Findings:   Image-guided gastrostomy tube placed via push-technique.  2 gastropexy T-fasteners also placed.  No complications.  Tube to gravity drainage overnight.  If no signs of peritonitis in the am, tube may be used for feeding.    Patient tolerated procedure well.    Diogo Lewis MD  Diagnostic and Interventional Radiologist  Department of Radiology  Pager: [phone]

## 2019-02-11 NOTE — H&P
Inpatient Radiology Pre-procedure Note    History of Present Illness:  Sheryl Martines is a 63 y.o. female with FTT s/p appendectomy complicated by intraabdominal abscess who presents to IR for a G-tube placement.    Admission H&P reviewed.    Past Medical History:   Diagnosis Date    Abnormal LFTs 12/14/2018    Asthma     Closed compression fracture of fourth lumbar vertebra     COPD (chronic obstructive pulmonary disease)     Coronary artery disease     Diabetes mellitus     Fall 10/4/2018    Fracture     right tib & fib    Glaucoma     High cholesterol     Hypertension     Infected incision 9/20/2018    Intractable nausea and vomiting 1/23/2019    Iritis     Pulmonary embolus     S/P appendectomy 9/11/2018    Stroke     rt sided weakness.     Past Surgical History:   Procedure Laterality Date    ABDOMINAL SURGERY      APPENDECTOMY N/A 8/26/2018    Performed by [facility]ine [facility]endrez [facility] at [facility] OR    APPENDECTOMY, LAPAROSCOPIC---CONVERTED TO OPEN APPENDECTOMY @0950 N/A 8/26/2018    Performed by [facility] at [facility] OR    APPLICATION, WOUND VAC N/A 1/25/2019    Performed by Monster Laurent MD at [facility] OR [address] [place] [address]    BACK SURGERY      stimulator    CATARACT EXTRACTION      EXCISION, ABSCESS- drainage abdominal wall abscess N/A 1/25/2019    Performed by Monster Laurent MD at [facility] OR [facility]    HYSTERECTOMY      INCISION AND DRAINAGE, ABSCESS-  drainage of pelvic abscess N/A 1/25/2019    Performed by Monster Laurent MD at [facility] OR [address] [place] [address]    LAPAROTOMY, EXPLORATORY N/A 1/25/2019    Performed by Monster Laurent MD at [facility] OR [facility]    TOTAL HIP ARTHROPLASTY Left     2008       Review of Systems:   As documented in primary team H&P    Home Meds:   Prior to Admission medications    Medication Sig Start Date End Date Taking? Authorizing Provider   acetaminophen (TYLENOL) 500 MG tablet Take 1,000 mg by mouth 2 (two) times daily as needed for Pain.   Yes Historical  Provider, MD   albuterol (PROVENTIL/VENTOLIN HFA) 90 mcg/actuation inhaler Inhale 2 puffs into the lungs every 6 (six) hours as needed for Wheezing or Shortness of Breath. Rescue   Yes Historical Provider, MD   albuterol-ipratropium (DUO-NEB) 2.5 mg-0.5 mg/3 mL nebulizer solution Take 3 mLs by nebulization every 4 (four) hours as needed for Wheezing or Shortness of Breath. Rescue    Yes Historical Provider, MD   aspirin (ECOTRIN) 81 MG EC tablet Take 1 tablet (81 mg total) by mouth once daily. 9/5/18 9/5/19 Yes Julian Carmichael MD   baclofen (LIORESAL) 10 MG tablet Take 10 mg by mouth 2 (two) times daily as needed (MUSCLE SPASMS).   Yes Historical Provider, MD   cefdinir (OMNICEF) 300 MG capsule TK ONE C PO  BID FOR 7 DAYS 12/26/18  Yes Historical Provider, MD   cephALEXin (KEFLEX) 750 MG capsule TK ONE C PO TID FOR 5 DAYS 11/19/18  Yes Historical Provider, MD   ciprofloxacin HCl (CIPRO) 250 MG tablet Take 1 tablet (250 mg total) by mouth every 12 (twelve) hours. Starting 12/15 evening 12/15/18  Yes Kendrick Celis MD   clopidogrel (PLAVIX) 75 mg tablet Take 75 mg by mouth once daily.   Yes Historical Provider, MD   diclofenac sodium (VOLTAREN) 1 % Gel Apply 2 g topically daily as needed (PAIN).   Yes Historical Provider, MD   diltiaZEM (CARDIZEM CD) 180 MG 24 hr capsule Take 180 mg by mouth once daily.   Yes Historical Provider, MD   DULoxetine (CYMBALTA) 60 MG capsule Take 60 mg by mouth once daily.   Yes Historical Provider, MD   fluticasone-vilanterol (BREO ELLIPTA) 100-25 mcg/dose diskus inhaler Inhale 1 puff into the lungs once daily. Controller   Yes Historical Provider, MD   gabapentin (NEURONTIN) 300 MG capsule Take 300 mg by mouth once daily.   Yes Historical Provider, MD   Lactobacillus rhamnosus-fiber 2.5 billion cell-3.5 gram PwPk Take 1 capsule by mouth. 11/18/18  Yes Historical Provider, MD   lansoprazole (PREVACID) 30 MG capsule Take 30 mg by mouth once daily.   Yes Historical Provider, MD    losartan (COZAAR) 100 MG tablet Take 100 mg by mouth once daily.    Yes Historical Provider, MD   ondansetron (ZOFRAN) 4 MG tablet Take 1 tablet (4 mg total) by mouth every 6 (six) hours as needed. 9/4/18  Yes Julian Carmichael MD   ondansetron (ZOFRAN) 4 MG tablet Take 4-8 mg by mouth. 1/9/19  Yes Historical Provider, MD   potassium chloride SA (K-DUR,KLOR-CON) 10 MEQ tablet Take 10 mEq by mouth. 1/9/19  Yes Historical Provider, MD   predniSONE (DELTASONE) 10 MG tablet Take 4 tablets (40 mg) on 12/16, then take 1 tablet (10 mg) daily 12/15/18  Yes Kendrick Celis MD   pyridoxine, vitamin B6, (VITAMIN B-6) 50 MG Tab Take 1 tablet (50 mg total) by mouth once daily. 10/3/18  Yes Wayne Morris MD   simvastatin (ZOCOR) 40 MG tablet Take 40 mg by mouth. 10/9/13  Yes Historical Provider, MD   theophylline (THEODUR) 300 mg 24 hr capsule Take 300 mg by mouth once daily.   Yes Historical Provider, MD   traMADol (ULTRAM) 50 mg tablet TK 1 T PO BID PRN 12/19/18  Yes Historical Provider, MD     Scheduled Meds:    aspirin  81 mg Oral Daily    ciprofloxacin  400 mg Intravenous Q12H    diltiaZEM  180 mg Oral Daily    enoxaparin  40 mg Subcutaneous Daily    fat emulsion 20%  250 mL Intravenous Daily    fat emulsion 20%  250 mL Intravenous Daily    fluticasone-vilanterol  1 puff Inhalation Daily    furosemide  40 mg Intravenous BID    ketorolac  15 mg Intravenous Q6H    metronidazole  500 mg Intravenous Q8H    miconazole nitrate 2%   Topical (Top) BID    pantoprozole (PROTONIX) IV  40 mg Intravenous Q12H    sodium chloride 0.9%  10 mL Intravenous Q6H     Continuous Infusions:    TPN ADULT CENTRAL LINE CUSTOM 50 mL/hr at 02/10/19 2226    TPN ADULT CENTRAL LINE CUSTOM       PRN Meds:albuterol sulfate, albuterol-ipratropium, dextrose 50%, diclofenac sodium, diphenhydrAMINE, glucagon (human recombinant), hydrALAZINE, HYDROmorphone, insulin aspart U-100, ondansetron, promethazine (PHENERGAN) IVPB, Flushing  PICC Protocol **AND** sodium chloride 0.9% **AND** sodium chloride 0.9%, sodium chloride 0.9%, sodium chloride 0.9%, white petrolatum  Anticoagulants/Antiplatelets: lovenox (last given at 6p on 2/10/19)     Allergies:   Review of patient's allergies indicates:   Allergen Reactions    Ace inhibitors Swelling    Carvedilol      Other reaction(s): Other (See Comments)  blister    Hydralazine analogues     Metformin Nausea And Vomiting    Tetracycline Itching    Tetracyclines Swelling    Travatan (with benzalkonium) [travoprost (benzalkonium)]     Travoprost Itching     Other reaction(s): Other (See Comments)  Blurry vision     Sedation Hx: have not been any systemic reactions    Labs:  Recent Labs   Lab 02/11/19 0422   INR 1.1       Recent Labs   Lab 02/11/19 0422   WBC 10.09   HGB 6.8*   HCT 21.6*   MCV 95   *      Recent Labs   Lab 02/11/19 0422   *      K 4.3      CO2 24   BUN 39*   CREATININE 0.7   CALCIUM 9.1   MG 2.4   ALT 21   AST 35   ALBUMIN 1.4*   BILITOT 3.2*         Vitals:  Temp: 98.5 °F (36.9 °C) (02/11/19 0356)  Pulse: 99 (02/11/19 0728)  Resp: 18 (02/11/19 0356)  BP: 127/73 (02/11/19 0356)  SpO2: (!) 94 % (02/11/19 0356)     Physical Exam:  ASA: 3  Mallampati: 3    General: no acute distress  Mental Status: alert and oriented to person, place and time  HEENT: + NGT, normocephalic, atraumatic  Chest: unlabored breathing  Heart: regular heart rate  Abdomen: nondistended  Extremity: moves all extremities      Plan:  Sedation Plan: moderate  Patient will undergo a G-tube placement by IR today.  Please keep NG tube in place.       Marian Rodas MD   Department of Radiology  PGY II Resident  Pager: [phone]

## 2019-02-11 NOTE — PROGRESS NOTES
"[facility]  Adult Nutrition  Progress Note    SUMMARY       Recommendations    Recommendation/Intervention:     1. Continue current TPN + IV lipids - meeting needs.     2. When medically able, recommend initiating TF of Glucerna 1.5 advancing to goal rate of 45 mL/hr to provide 1620 kcal, 89 gm protein, and 820 mL free fluid.    - If bolus desired, recommend TF of Glucerna 1.5 x 4.5 cans/day to provide 1602 kcal, 88 gm protein, and 810 mL free fluid.     3. When able to advance diet, ADAT to Diabetic with texture per SLP.    4. RD following.       Goals: Patient to meet > 85% EEN and EPN  Nutrition Goal Status: goal met  Communication of RD Recs: (POC)    Reason for Assessment    Reason For Assessment: RD follow-up  Diagnosis: infection/sepsis(pelvic abscess)  Relevant Medical History: COPD, CVA, DM, HTN, HLD  Interdisciplinary Rounds: attended  General Information Comments: S/p ex-lap with drainage of abscess. Failed swallow study, wean TPN tomorrow. Pt GENARO 2/2 G-tube placement. NFPE completed , patient with moderate malnutrition.   Nutrition Discharge Planning: unable to determine at this time    Nutrition Risk Screen    Nutrition Risk Screen: no indicators present    Nutrition/Diet History    Typical Food/Fluid Intake: Per MD note, patient reported poor PO itnake PTA 2/2 abdominal pain and N/V.  Spiritual, Cultural Beliefs, Confucianism Practices, Values that Affect Care: no  Factors Affecting Nutritional Intake: NPO    Anthropometrics    Temp: 98.5 °F (36.9 °C)  Height: 5' 1" (154.9 cm)  Height (inches): 61 in  Weight Method: Bed Scale  Weight: 75.4 kg (166 lb 3.6 oz)  Weight (lb): 166.23 lb  Ideal Body Weight (IBW), Female: 105 lb  % Ideal Body Weight, Female (lb): 149.52 lb  BMI (Calculated): 29.7  BMI Grade: 25 - 29.9 - overweight  Usual Body Weight (UBW), k.6 kg(per chart review 2018)  % Usual Body Weight: 87.46  % Weight Change From Usual Weight: -12.73 %   "   Lab/Procedures/Meds    Pertinent Labs Reviewed: reviewed  Pertinent Labs Comments: BUN 39, glucose 169, alk phos 498, T bili 3.2(triglycerides 476 on 2/10)  Pertinent Medications Reviewed: reviewed  Pertinent Medications Comments: aspirin, lasix    Estimated/Assessed Needs    Weight Used For Calorie Calculations: 71.2 kg (156 lb 15.5 oz)  Energy Calorie Requirements (kcal): 1505 kcal/day  Energy Need Method: [place]-St Jeor(x 1.25)  Protein Requirements:  g/day(1.2-1.4 g/kg)  Weight Used For Protein Calculations: 71.2 kg (156 lb 15.5 oz)  Fluid Requirements (mL): 1 mL/kcal or per MD  Estimated Fluid Requirement Method: RDA Method  RDA Method (mL): 1505  CHO Requirement: 50% total kcal    Nutrition Prescription Ordered    Current Diet Order: NPO  Nutrition Order Comments: (per chart review, TPN to be weaned tomorrow)  Current Nutrition Support Formula Ordered: (Custom  gm AA / 175 gm Dex + IV Lipids)  Current Nutrition Support Rate Ordered: 50 (ml)  Current Nutrition Support Frequency Ordered: mL/hr    Evaluation of Received Nutrient/Fluid Intake    Parenteral Calories (kcal): 1075  Parenteral Protein (gm): 120  Parenteral Fluid (mL): 1200  Lipid Calories (kcals): 500 kcals  GIR (Glucose Infusion Rate) (mg/kg/min): 1.61 mg/kg/min  Total Calories (kcal): 1575  % Kcal Needs: 105  % Protein Needs: 120  I/O: +2L x 24 hrs  Energy Calories Required: meeting needs  Protein Required: meeting needs  Fluid Required: (per MD)  Comments: LBM 2/8  Tolerance: tolerating  % Intake of Estimated Energy Needs: 75 - 100 %  % Meal Intake: NPO    Nutrition Risk    Level of Risk/Frequency of Follow-up: (f/u 2 x wk)     Assessment and Plan    Moderate malnutrition    Contributing Nutrition Diagnosis  Malnutrition in the context of Acute Illness/Injury    Related to (etiology):  Decreased PO intake 2/2 abdominal pain and N/V    Signs and Symptoms (as evidenced by):  Energy Intake: <75% of estimated energy requirement for  several months per patient  Body Fat Depletion: mild depletion of orbitals and triceps   Muscle Mass Depletion: mild and moderate depletion of temples, clavicle region and lower extremities   Weight Loss: 12.7% x 5 months   Fluid Accumulation: mild and moderate    Interventions/Recommendations (treatment strategy):  Collaboration and referral of nutrition care     Nutrition Diagnosis Status:  Continues          Monitor and Evaluation    Food and Nutrient Intake: energy intake, parenteral nutrition intake  Food and Nutrient Adminstration: enteral and parenteral nutrition administration  Anthropometric Measurements: weight, weight change  Biochemical Data, Medical Tests and Procedures: electrolyte and renal panel, gastrointestinal profile, glucose/endocrine profile, inflammatory profile  Nutrition-Focused Physical Findings: overall appearance     Malnutrition Assessment                 Orbital Region (Subcutaneous Fat Loss): mild depletion  Upper Arm Region (Subcutaneous Fat Loss): mild depletion   [place] Region (Muscle Loss): moderate depletion  Clavicle Bone Region (Muscle Loss): mild depletion  Anterior Thigh Region (Muscle Loss): mild depletion   Edema (Fluid Accumulation): 2-->mild             Nutrition Follow-Up    RD Follow-up?: Yes

## 2019-02-11 NOTE — PLAN OF CARE
Visited patient. AAOX4. Using Yankeur suction orally. NGT LIWS, s/p IR PEG tube today. AAOX4. CM discussed [facility] liasion is looking at PT/OT notes for participation & progress;Encouraged her to partake in PT/OT, doing the best she can so she can be accepted to [facility]. She verbalized her understanding & agrees to participate.

## 2019-02-12 PROBLEM — T38.0X5S ADVERSE EFFECT OF ADRENAL CORTICAL STEROIDS, SEQUELA: Status: RESOLVED | Noted: 2019-01-28 | Resolved: 2019-02-12

## 2019-02-12 PROBLEM — Z78.9 ON ENTERAL NUTRITION: Status: ACTIVE | Noted: 2019-02-12

## 2019-02-12 LAB
ALBUMIN SERPL BCP-MCNC: 1.5 G/DL
ALP SERPL-CCNC: 673 U/L
ALT SERPL W/O P-5'-P-CCNC: 20 U/L
ANION GAP SERPL CALC-SCNC: 6 MMOL/L
ANISOCYTOSIS BLD QL SMEAR: SLIGHT
AST SERPL-CCNC: 37 U/L
BASO STIPL BLD QL SMEAR: ABNORMAL
BASOPHILS # BLD AUTO: 0.06 K/UL
BASOPHILS NFR BLD: 0.5 %
BILIRUB SERPL-MCNC: 3.8 MG/DL
BUN SERPL-MCNC: 44 MG/DL
CA-I BLDV-SCNC: 1.29 MMOL/L
CALCIUM SERPL-MCNC: 9.6 MG/DL
CHLORIDE SERPL-SCNC: 107 MMOL/L
CO2 SERPL-SCNC: 26 MMOL/L
CREAT SERPL-MCNC: 0.8 MG/DL
DACRYOCYTES BLD QL SMEAR: ABNORMAL
DIFFERENTIAL METHOD: ABNORMAL
EOSINOPHIL # BLD AUTO: 1.2 K/UL
EOSINOPHIL NFR BLD: 9.3 %
ERYTHROCYTE [DISTWIDTH] IN BLOOD BY AUTOMATED COUNT: 17.9 %
EST. GFR  (AFRICAN AMERICAN): >60 ML/MIN/1.73 M^2
EST. GFR  (NON AFRICAN AMERICAN): >60 ML/MIN/1.73 M^2
GLUCOSE SERPL-MCNC: 196 MG/DL
HCT VFR BLD AUTO: 29.7 %
HGB BLD-MCNC: 9.3 G/DL
IMM GRANULOCYTES # BLD AUTO: 0.1 K/UL
IMM GRANULOCYTES NFR BLD AUTO: 0.8 %
LYMPHOCYTES # BLD AUTO: 2.3 K/UL
LYMPHOCYTES NFR BLD: 18.8 %
MAGNESIUM SERPL-MCNC: 2.5 MG/DL
MCH RBC QN AUTO: 28.4 PG
MCHC RBC AUTO-ENTMCNC: 31.3 G/DL
MCV RBC AUTO: 91 FL
MONOCYTES # BLD AUTO: 0.9 K/UL
MONOCYTES NFR BLD: 7.4 %
NEUTROPHILS # BLD AUTO: 7.9 K/UL
NEUTROPHILS NFR BLD: 63.2 %
NRBC BLD-RTO: 0 /100 WBC
PHOSPHATE SERPL-MCNC: 4.1 MG/DL
PLATELET # BLD AUTO: 84 K/UL
PLATELET BLD QL SMEAR: ABNORMAL
PMV BLD AUTO: 12.7 FL
POCT GLUCOSE: 185 MG/DL (ref 70–110)
POCT GLUCOSE: 194 MG/DL (ref 70–110)
POCT GLUCOSE: 196 MG/DL (ref 70–110)
POCT GLUCOSE: 226 MG/DL (ref 70–110)
POIKILOCYTOSIS BLD QL SMEAR: SLIGHT
POTASSIUM SERPL-SCNC: 4.3 MMOL/L
PROT SERPL-MCNC: 5.9 G/DL
RBC # BLD AUTO: 3.27 M/UL
SCHISTOCYTES BLD QL SMEAR: ABNORMAL
SCHISTOCYTES BLD QL SMEAR: PRESENT
SODIUM SERPL-SCNC: 139 MMOL/L
TARGETS BLD QL SMEAR: ABNORMAL
WBC # BLD AUTO: 12.44 K/UL

## 2019-02-12 PROCEDURE — 27000221 HC OXYGEN, UP TO 24 HOURS

## 2019-02-12 PROCEDURE — 80053 COMPREHEN METABOLIC PANEL: CPT

## 2019-02-12 PROCEDURE — 25000003 PHARM REV CODE 250: Performed by: SURGERY

## 2019-02-12 PROCEDURE — 25000242 PHARM REV CODE 250 ALT 637 W/ HCPCS: Performed by: STUDENT IN AN ORGANIZED HEALTH CARE EDUCATION/TRAINING PROGRAM

## 2019-02-12 PROCEDURE — 84100 ASSAY OF PHOSPHORUS: CPT

## 2019-02-12 PROCEDURE — 36430 TRANSFUSION BLD/BLD COMPNT: CPT

## 2019-02-12 PROCEDURE — 63600175 PHARM REV CODE 636 W HCPCS: Performed by: STUDENT IN AN ORGANIZED HEALTH CARE EDUCATION/TRAINING PROGRAM

## 2019-02-12 PROCEDURE — 83735 ASSAY OF MAGNESIUM: CPT

## 2019-02-12 PROCEDURE — 94664 DEMO&/EVAL PT USE INHALER: CPT

## 2019-02-12 PROCEDURE — 99231 SBSQ HOSP IP/OBS SF/LOW 25: CPT | Mod: ,,, | Performed by: NURSE PRACTITIONER

## 2019-02-12 PROCEDURE — A4216 STERILE WATER/SALINE, 10 ML: HCPCS | Performed by: SURGERY

## 2019-02-12 PROCEDURE — 20600001 HC STEP DOWN PRIVATE ROOM

## 2019-02-12 PROCEDURE — 85025 COMPLETE CBC W/AUTO DIFF WBC: CPT

## 2019-02-12 PROCEDURE — C9113 INJ PANTOPRAZOLE SODIUM, VIA: HCPCS | Performed by: PHYSICIAN ASSISTANT

## 2019-02-12 PROCEDURE — 27000646 HC AEROBIKA DEVICE

## 2019-02-12 PROCEDURE — 63600175 PHARM REV CODE 636 W HCPCS: Performed by: PHYSICIAN ASSISTANT

## 2019-02-12 PROCEDURE — 99900035 HC TECH TIME PER 15 MIN (STAT)

## 2019-02-12 PROCEDURE — 99231 PR SUBSEQUENT HOSPITAL CARE,LEVL I: ICD-10-PCS | Mod: ,,, | Performed by: NURSE PRACTITIONER

## 2019-02-12 PROCEDURE — 25000003 PHARM REV CODE 250: Performed by: PHYSICIAN ASSISTANT

## 2019-02-12 PROCEDURE — 82330 ASSAY OF CALCIUM: CPT

## 2019-02-12 PROCEDURE — 94761 N-INVAS EAR/PLS OXIMETRY MLT: CPT

## 2019-02-12 RX ORDER — DULOXETIN HYDROCHLORIDE 60 MG/1
60 CAPSULE, DELAYED RELEASE ORAL DAILY
Status: DISCONTINUED | OUTPATIENT
Start: 2019-02-12 | End: 2019-02-20

## 2019-02-12 RX ORDER — FUROSEMIDE 40 MG/1
40 TABLET ORAL 2 TIMES DAILY
Status: DISCONTINUED | OUTPATIENT
Start: 2019-02-12 | End: 2019-02-18

## 2019-02-12 RX ORDER — CLOPIDOGREL BISULFATE 75 MG/1
75 TABLET ORAL DAILY
Status: DISCONTINUED | OUTPATIENT
Start: 2019-02-12 | End: 2019-02-22

## 2019-02-12 RX ADMIN — FUROSEMIDE 40 MG: 40 TABLET ORAL at 05:02

## 2019-02-12 RX ADMIN — ENOXAPARIN SODIUM 40 MG: 100 INJECTION SUBCUTANEOUS at 05:02

## 2019-02-12 RX ADMIN — HYDROMORPHONE HYDROCHLORIDE 1 MG: 1 INJECTION, SOLUTION INTRAMUSCULAR; INTRAVENOUS; SUBCUTANEOUS at 05:02

## 2019-02-12 RX ADMIN — HYDROMORPHONE HYDROCHLORIDE 1 MG: 1 INJECTION, SOLUTION INTRAMUSCULAR; INTRAVENOUS; SUBCUTANEOUS at 08:02

## 2019-02-12 RX ADMIN — DULOXETINE 60 MG: 60 CAPSULE, DELAYED RELEASE ORAL at 08:02

## 2019-02-12 RX ADMIN — CIPROFLOXACIN HYDROCHLORIDE 500 MG: 500 TABLET, FILM COATED ORAL at 08:02

## 2019-02-12 RX ADMIN — INSULIN ASPART 1 UNITS: 100 INJECTION, SOLUTION INTRAVENOUS; SUBCUTANEOUS at 11:02

## 2019-02-12 RX ADMIN — PANTOPRAZOLE SODIUM 40 MG: 40 INJECTION, POWDER, LYOPHILIZED, FOR SOLUTION INTRAVENOUS at 08:02

## 2019-02-12 RX ADMIN — ASPIRIN 81 MG CHEWABLE TABLET 81 MG: 81 TABLET CHEWABLE at 08:02

## 2019-02-12 RX ADMIN — INSULIN ASPART 1 UNITS: 100 INJECTION, SOLUTION INTRAVENOUS; SUBCUTANEOUS at 09:02

## 2019-02-12 RX ADMIN — KETOROLAC TROMETHAMINE 15 MG: 15 INJECTION, SOLUTION INTRAMUSCULAR; INTRAVENOUS at 11:02

## 2019-02-12 RX ADMIN — INSULIN ASPART 2 UNITS: 100 INJECTION, SOLUTION INTRAVENOUS; SUBCUTANEOUS at 04:02

## 2019-02-12 RX ADMIN — HYDROMORPHONE HYDROCHLORIDE 1 MG: 1 INJECTION, SOLUTION INTRAMUSCULAR; INTRAVENOUS; SUBCUTANEOUS at 09:02

## 2019-02-12 RX ADMIN — Medication 10 ML: at 05:02

## 2019-02-12 RX ADMIN — Medication 10 ML: at 11:02

## 2019-02-12 RX ADMIN — PANTOPRAZOLE SODIUM 40 MG: 40 INJECTION, POWDER, LYOPHILIZED, FOR SOLUTION INTRAVENOUS at 09:02

## 2019-02-12 RX ADMIN — METRONIDAZOLE 500 MG: 500 TABLET ORAL at 09:02

## 2019-02-12 RX ADMIN — DILTIAZEM HYDROCHLORIDE 180 MG: 180 CAPSULE, COATED, EXTENDED RELEASE ORAL at 08:02

## 2019-02-12 RX ADMIN — MICONAZOLE NITRATE: 20 OINTMENT TOPICAL at 08:02

## 2019-02-12 RX ADMIN — HYDROMORPHONE HYDROCHLORIDE 1 MG: 1 INJECTION, SOLUTION INTRAMUSCULAR; INTRAVENOUS; SUBCUTANEOUS at 12:02

## 2019-02-12 RX ADMIN — CLOPIDOGREL 75 MG: 75 TABLET, FILM COATED ORAL at 08:02

## 2019-02-12 RX ADMIN — CIPROFLOXACIN HYDROCHLORIDE 500 MG: 500 TABLET, FILM COATED ORAL at 09:02

## 2019-02-12 RX ADMIN — KETOROLAC TROMETHAMINE 15 MG: 15 INJECTION, SOLUTION INTRAMUSCULAR; INTRAVENOUS at 05:02

## 2019-02-12 RX ADMIN — METRONIDAZOLE 500 MG: 500 TABLET ORAL at 05:02

## 2019-02-12 RX ADMIN — FLUTICASONE FUROATE AND VILANTEROL TRIFENATATE 1 PUFF: 100; 25 POWDER RESPIRATORY (INHALATION) at 08:02

## 2019-02-12 RX ADMIN — IPRATROPIUM BROMIDE AND ALBUTEROL SULFATE 3 ML: .5; 3 SOLUTION RESPIRATORY (INHALATION) at 08:02

## 2019-02-12 RX ADMIN — FUROSEMIDE 40 MG: 10 INJECTION, SOLUTION INTRAVENOUS at 08:02

## 2019-02-12 RX ADMIN — METRONIDAZOLE 500 MG: 500 TABLET ORAL at 01:02

## 2019-02-12 RX ADMIN — INSULIN ASPART 4 UNITS: 100 INJECTION, SOLUTION INTRAVENOUS; SUBCUTANEOUS at 11:02

## 2019-02-12 RX ADMIN — Medication 10 ML: at 12:02

## 2019-02-12 RX ADMIN — MICONAZOLE NITRATE: 20 OINTMENT TOPICAL at 09:02

## 2019-02-12 NOTE — PT/OT/SLP PROGRESS
Physical Therapy Treatment    Patient Name:  Sheryl Martines   MRN:  [id number]    Recommendations:     Discharge Recommendations:  nursing facility, skilled - in NH  Discharge Equipment Recommendations: none   Barriers to discharge: Decreased caregiver support- needs total care     Assessment:     Sheryl Martines is a 63 y.o. female admitted with a medical diagnosis of Pelvic abscess in female.  She presents with the following impairments/functional limitations:  weakness, impaired endurance, impaired self care skills, impaired functional mobilty, decreased upper extremity function, decreased lower extremity function, decreased safety awareness, pain, edema, impaired fine motor, decreased ROM.  Ms. Martines was limited today by pain in her LLE. She demonstrates decreased motor control for L foot dorsiflexion and hip internal rotation. She was able to participate in lower extremity exercises until she became extremely tired after administration of pain medication. She will benefit from continued PT services, focusing on her overall functional mobility.    Rehab Prognosis: Fair; patient would benefit from acute skilled PT services to address these deficits and reach maximum level of function.    Recent Surgery: Procedure(s) (LRB):  LAPAROTOMY, EXPLORATORY (N/A)  INCISION AND DRAINAGE, ABSCESS-  drainage of pelvic abscess (N/A)  EXCISION, ABSCESS- drainage abdominal wall abscess (N/A)  APPLICATION, WOUND VAC (N/A) 18 Days Post-Op    Plan:     During this hospitalization, patient to be seen 2 x/week to address the identified rehab impairments via therapeutic activities, therapeutic exercises, neuromuscular re-education and progress toward the following goals:    · Plan of Care Expires:  02/24/19    Subjective     Chief Complaint: Pain in L leg  Patient/Family Comments/goals: none  Pain/Comfort:  · Pain Rating 1: 9/10  · Location - Side 1: Left  · Location - Orientation 1: generalized  · Location 1: leg  · Pain  Addressed 1: Pre-medicate for activity, Reposition, Cessation of Activity      Objective:     Communicated with nurse prior to session.  Patient found call button in reach and supine, nurse present oxygen, PICC line, PEG Tube  upon PT entry to room.     General Precautions: Standard, fall, aspiration   Orthopedic Precautions:N/A   Braces: N/A     Functional Mobility:  · None performed today due to pain      AM-PAC 6 CLICK MOBILITY  Turning over in bed (including adjusting bedclothes, sheets and blankets)?: 2  Sitting down on and standing up from a chair with arms (e.g., wheelchair, bedside commode, etc.): 1  Moving from lying on back to sitting on the side of the bed?: 2  Moving to and from a bed to a chair (including a wheelchair)?: 1  Need to walk in hospital room?: 1  Climbing 3-5 steps with a railing?: 1  Basic Mobility Total Score: 8       Therapeutic Activities and Exercises:   Supine exercises: heel slides, hip internal/external rotation, dorsiflexion x 10 reps BLE (active assistance provided)    Patient left HOB elevated with call button in reach..    GOALS:   Multidisciplinary Problems     Physical Therapy Goals        Problem: Physical Therapy Goal    Goal Priority Disciplines Outcome Goal Variances Interventions   Physical Therapy Goal     PT, PT/OT Ongoing (interventions implemented as appropriate)     Description:  Goals to be met by: 19    Patient will increase functional independence with mobility by performin. Supine to sit with Moderate Assistance -not met  2. Sit to stand transfer with Maximum Assistance -not met  3. Bed to chair transfer with Maximum Assistance -not met  4. Sitting at edge of bed x10 minutes with Contact Guard Assistance -not met  5. Lower extremity exercise program x10 reps, with assistance as needed - met 2/4                        Time Tracking:     PT Received On: 19  PT Start Time: 1203     PT Stop Time: 1226  PT Total Time (min): 23 min     Billable  Minutes: Therapeutic Exercise 23    Treatment Type: Treatment  PT/PTA: PT     PTA Visit Number: 0     Gisselle Maurice, PT  02/12/2019

## 2019-02-12 NOTE — SUBJECTIVE & OBJECTIVE
Interval History:  Patient seen and examined, no acute events overnight  S/p PEG placement - minimal output overnight  Abdominal pain well controlled  +F/no BM  Afebrile/VSS    Medications:  Continuous Infusions:   TPN ADULT CENTRAL LINE CUSTOM 50 mL/hr at 02/11/19 2333     Scheduled Meds:   aspirin  81 mg Oral Daily    ciprofloxacin HCl  500 mg Oral Q12H    diltiaZEM  180 mg Oral Daily    enoxaparin  40 mg Subcutaneous Daily    fat emulsion 20%  250 mL Intravenous Daily    fluticasone-vilanterol  1 puff Inhalation Daily    furosemide  40 mg Intravenous BID    glucagon (human recombinant)  1 mg Intramuscular Once    ketorolac  15 mg Intravenous Q6H    metroNIDAZOLE  500 mg Oral Q8H    miconazole nitrate 2%   Topical (Top) BID    pantoprozole (PROTONIX) IV  40 mg Intravenous Q12H    sodium chloride 0.9%  10 mL Intravenous Q6H     PRN Meds:albuterol sulfate, albuterol-ipratropium, dextrose 50%, diclofenac sodium, diphenhydrAMINE, glucagon (human recombinant), hydrALAZINE, HYDROmorphone, insulin aspart U-100, ondansetron, promethazine (PHENERGAN) IVPB, Flushing PICC Protocol **AND** sodium chloride 0.9% **AND** sodium chloride 0.9%, sodium chloride 0.9%, sodium chloride 0.9%, white petrolatum     Review of patient's allergies indicates:   Allergen Reactions    Ace inhibitors Swelling    Carvedilol      Other reaction(s): Other (See Comments)  blister    Hydralazine analogues     Metformin Nausea And Vomiting    Tetracycline Itching    Tetracyclines Swelling    Travatan (with benzalkonium) [travoprost (benzalkonium)]     Travoprost Itching     Other reaction(s): Other (See Comments)  Blurry vision     Objective:     Vital Signs (Most Recent):  Temp: 98.9 °F (37.2 °C) (02/12/19 0541)  Pulse: 77 (02/12/19 0723)  Resp: 16 (02/12/19 0541)  BP: (!) 104/53 (02/12/19 0541)  SpO2: 98 % (02/12/19 0541) Vital Signs (24h Range):  Temp:  [97.4 °F (36.3 °C)-98.9 °F (37.2 °C)] 98.9 °F (37.2 °C)  Pulse:  []  77  Resp:  [16-23] 16  SpO2:  [92 %-100 %] 98 %  BP: (100-147)/(53-79) 104/53     Weight: 75.4 kg (166 lb 3.6 oz)  Body mass index is 31.41 kg/m².    Intake/Output - Last 3 Shifts       02/10 0700 - 02/11 0659 02/11 0700 - 02/12 0659 02/12 0700 - 02/13 0659    P.O. 0      Blood  808.8     NG/GT  60     IV Piggyback 700      TPN 1359.1 1150.3     Total Intake(mL/kg) 2059.1 (27.3) 2019.1 (26.8)     Urine (mL/kg/hr) 0 (0)      Emesis/NG output 0      Drains 0 0     Other 0      Stool 0      Total Output 0 0     Net +2059.1 +2019.1            Urine Occurrence 2 x      Stool Occurrence 0 x      Emesis Occurrence 0 x            Physical Exam   Constitutional: She appears well-developed and well-nourished. No distress.   HENT:   Head: Normocephalic and atraumatic.   Cardiovascular: Normal rate and regular rhythm.   Pulmonary/Chest: Effort normal. No respiratory distress.   Abdominal:   Soft, appropriate TTP  Open wounds covered with adequate dressing. No signs of infection. LUCIANO drain with serous output.        Significant Labs:  CBC:   Recent Labs   Lab 02/12/19  0510   WBC 12.44   RBC 3.27*   HGB 9.3*   HCT 29.7*   PLT 84*   MCV 91   MCH 28.4   MCHC 31.3*       CMP:   Recent Labs   Lab 02/12/19  0510   *   CALCIUM 9.6   ALBUMIN 1.5*   PROT 5.9*      K 4.3   CO2 26      BUN 44*   CREATININE 0.8   ALKPHOS 673*   ALT 20   AST 37   BILITOT 3.8*     LFTs:   Recent Labs   Lab 02/12/19  0510   ALT 20   AST 37   ALKPHOS 673*   BILITOT 3.8*   PROT 5.9*   ALBUMIN 1.5*

## 2019-02-12 NOTE — PROGRESS NOTES
[facility]  Endocrinology  Progress Note    Admit Date: 1/23/2019     Reason for Consult: Management of T2DM, Hyperglycemia     Surgical Procedure and Date:      LAPAROTOMY, EXPLORATORY (N/A)     INCISION AND DRAINAGE, ABSCESS-  drainage of pelvic abscess (N/A)     EXCISION, ABSCESS- drainage abdominal wall abscess (N/A)     APPLICATION, WOUND VAC (N/A)       Diabetes diagnosis year: 20 years ago    Home Diabetes Medications:  Reports being taken off of insulin prior to admission.      Levemir 20 units BID     Humalog 16 units TIDWM with correction     How often checking glucose at home? >4 x day   BG readings on regimen: 110's  Hypoglycemia on the regimen?  Yes - 40's  Missed doses on regimen?  No    Diabetes Complications include:     Hypoglycemia     Complicating diabetes co morbidities:   HLD, CVA, and CAD    Lab Results   Component Value Date    HGBA1C 4.7 01/24/2019       HPI:   Patient is a 63 y.o. female with a diagnosis of sepsis and lactic acidosis secondary to abdominal abscess. Also has diagnosis of COPD, asthma, CAD, CVA (2012), and DM2. Patient receives primary care for DM in [place]. Her primary care giver is her daughter. Patient's DM is well controlled by primary Endocrinologist in [place]. Patient has not been on insulin regimen for the past 3 weeks leading up to hospitalization. According to daughter, patient was not eating, and having low BG reading. Therefore, insulin regimen was stopped by patient's primary Endocrinologist. Endocrinology at [facility] consulted to manage DM/hyperglycemia.             Interval HPI:   Overnight events:  Transferred to [facility]. Contact precautions. BG at or slightly above goal with correction scale coverage. TPN at 50 cc/hr; weaning TPN. Trickle feeds; to advance. Antibiotics.   Eating:   NPO  Nausea: No  Hypoglycemia and intervention: No  Fever: No  TPN at 50 cc/hr- will decrease to 25 cc/hr; trickle feeds of glucerna at 10 cc/hr (increase by 10 cc/hr  "every 6 hours to goal of 45 cc/hr).     BP (!) 127/58 (BP Location: Right arm, Patient Position: Lying)   Pulse 78   Temp 98.2 °F (36.8 °C) (Oral)   Resp 16   Ht 5' 1" (1.549 m)   Wt 75.4 kg (166 lb 3.6 oz)   LMP  (LMP Unknown)   SpO2 99%   Breastfeeding? No   BMI 31.41 kg/m²      Labs Reviewed and Include    Recent Labs   Lab 02/12/19  0510   *   CALCIUM 9.6   ALBUMIN 1.5*   PROT 5.9*      K 4.3   CO2 26      BUN 44*   CREATININE 0.8   ALKPHOS 673*   ALT 20   AST 37   BILITOT 3.8*     Lab Results   Component Value Date    WBC 12.44 02/12/2019    HGB 9.3 (L) 02/12/2019    HCT 29.7 (L) 02/12/2019    MCV 91 02/12/2019    PLT 84 (L) 02/12/2019     No results for input(s): TSH, FREET4 in the last 168 hours.  Lab Results   Component Value Date    HGBA1C 4.7 01/24/2019       Nutritional status:   Body mass index is 31.41 kg/m².  Lab Results   Component Value Date    ALBUMIN 1.5 (L) 02/12/2019    ALBUMIN 1.4 (L) 02/11/2019    ALBUMIN 1.4 (L) 02/10/2019     Lab Results   Component Value Date    PREALBUMIN 6 (L) 02/07/2019    PREALBUMIN 6 (L) 02/07/2019    PREALBUMIN 10 (L) 02/04/2019       Estimated Creatinine Clearance: 66.8 mL/min (based on SCr of 0.8 mg/dL).    Accu-Checks  Recent Labs     02/09/19  0823 02/09/19  1154 02/10/19  0024 02/10/19  0918 02/10/19  1149 02/10/19  1542 02/11/19  0024 02/11/19  1414   POCTGLUCOSE 153* 157* 147* 185* 198* 189* 187* 224*       Current Medications and/or Treatments Impacting Glycemic Control  Immunotherapy:    Immunosuppressants     None        Steroids:   Hormones (From admission, onward)    None        Pressors:    Autonomic Drugs (From admission, onward)    None        Hyperglycemia/Diabetes Medications:   Antihyperglycemics (From admission, onward)    Start     Stop Route Frequency Ordered    02/03/19 1016  insulin aspart U-100 pen 1-10 Units      -- SubQ Every 4 hours PRN 02/03/19 0916          ASSESSMENT and PLAN    * Pelvic abscess in female    " Managed per primary team  Avoid hypoglycemia  Optimize BG control to improve wound healing         Type 2 diabetes mellitus    BG goal 140 - 180       BG monitoring every 4 hours and moderate dose correction scale.   BG slightly above goal but given weaning of TPN start of TF will continue correction scale coverage at this time.     Discharge planning: TBD         CAD (coronary artery disease)    Managed per primary team  Condition may cause insulin resistance          On total parenteral nutrition (TPN)    May elevate BG   Weaning rate per primary.        Adverse effect of adrenal cortical steroids, sequela-resolved as of 2/12/2019    On steroid therapy per primary team; may elevate BG readings         On enteral nutrition    Trickle feeds at 10 cc/hr to advance to 45 cc/hr   May elevate BG.        Overweight (BMI 25.0-29.9)    Body mass index is 31.41 kg/m².  may contribute to insulin resistance             Marta Sandoval NP  Endocrinology  [facility]

## 2019-02-12 NOTE — PLAN OF CARE
ION was informed by Surgery PA, Nunu Cee, that Pt had gotten her PEG yesterday and was medically stable for discharge today. ION checked in with Cathy from [facility] who said Pt was still on TPN and IV Lasix and would need to be off both for 24 hours before they could accept. Cathy said she would also need to see new PT and OT notes in which Pt was participating with therapy. ION informed Surgery PA of this and will continue to follow.     Erma Godinez LCSW        02/12/19 7932   Post-Acute Status   Post-Acute Authorization Placement   Post-Acute Placement Status Referrals Sent

## 2019-02-12 NOTE — PROGRESS NOTES
"[facility]  General Surgery  Progress Note    Subjective:     History of Present Illness:  64 yo W with a history of HTN, COPD on home oxygen, HFpEF, IDDMII, CVA on plavix, HTN, debility after fall and broken hip, and PE who presents to the ED with a several month history of nausea, vomiting, inability to tolerate a diet and weight loss. She has had multiple admissions in the past few months for different ailments. She presents today with continued nausea, vomiting and abdominal pain that is mostly worse in the RLQ. During the examination, she states her pain was all over her abdomen because she was retching so much. She states that she has lost close to 40lbs since her surgery and that she only able to keep broth down. She had a lap converted to open appendectomy back in August 2018 that was complicated by a wound infection, C diff and failure to thrive. This seems to persists. She is now wheelchair bound (per her) and apparently lives in [place] but is here in [place] with her daughter.    She had a CT scan in the ED which revealed an irregular shaped rim enhancing fluid collection measuring at least 4.0 x 3.0 cm ight hemipelvis at the site of prior appendectomy. Surgery was consulted for further recommendations. She was also noted to have a "knot" at the RLQ incision site that is also tender.    Post-Op Info:  Procedure(s) (LRB):  LAPAROTOMY, EXPLORATORY (N/A)  INCISION AND DRAINAGE, ABSCESS-  drainage of pelvic abscess (N/A)  EXCISION, ABSCESS- drainage abdominal wall abscess (N/A)  APPLICATION, WOUND VAC (N/A)   18 Days Post-Op     Interval History:  Patient seen and examined, no acute events overnight  S/p PEG placement - minimal output overnight  Abdominal pain well controlled  +F/no BM  Afebrile/VSS    Medications:  Continuous Infusions:   TPN ADULT CENTRAL LINE CUSTOM 50 mL/hr at 02/11/19 8578     Scheduled Meds:   aspirin  81 mg Oral Daily    ciprofloxacin HCl  500 mg Oral Q12H    " diltiaZEM  180 mg Oral Daily    enoxaparin  40 mg Subcutaneous Daily    fat emulsion 20%  250 mL Intravenous Daily    fluticasone-vilanterol  1 puff Inhalation Daily    furosemide  40 mg Intravenous BID    glucagon (human recombinant)  1 mg Intramuscular Once    ketorolac  15 mg Intravenous Q6H    metroNIDAZOLE  500 mg Oral Q8H    miconazole nitrate 2%   Topical (Top) BID    pantoprozole (PROTONIX) IV  40 mg Intravenous Q12H    sodium chloride 0.9%  10 mL Intravenous Q6H     PRN Meds:albuterol sulfate, albuterol-ipratropium, dextrose 50%, diclofenac sodium, diphenhydrAMINE, glucagon (human recombinant), hydrALAZINE, HYDROmorphone, insulin aspart U-100, ondansetron, promethazine (PHENERGAN) IVPB, Flushing PICC Protocol **AND** sodium chloride 0.9% **AND** sodium chloride 0.9%, sodium chloride 0.9%, sodium chloride 0.9%, white petrolatum     Review of patient's allergies indicates:   Allergen Reactions    Ace inhibitors Swelling    Carvedilol      Other reaction(s): Other (See Comments)  blister    Hydralazine analogues     Metformin Nausea And Vomiting    Tetracycline Itching    Tetracyclines Swelling    Travatan (with benzalkonium) [travoprost (benzalkonium)]     Travoprost Itching     Other reaction(s): Other (See Comments)  Blurry vision     Objective:     Vital Signs (Most Recent):  Temp: 98.9 °F (37.2 °C) (02/12/19 0541)  Pulse: 77 (02/12/19 0723)  Resp: 16 (02/12/19 0541)  BP: (!) 104/53 (02/12/19 0541)  SpO2: 98 % (02/12/19 0541) Vital Signs (24h Range):  Temp:  [97.4 °F (36.3 °C)-98.9 °F (37.2 °C)] 98.9 °F (37.2 °C)  Pulse:  [] 77  Resp:  [16-23] 16  SpO2:  [92 %-100 %] 98 %  BP: (100-147)/(53-79) 104/53     Weight: 75.4 kg (166 lb 3.6 oz)  Body mass index is 31.41 kg/m².    Intake/Output - Last 3 Shifts       02/10 0700 - 02/11 0659 02/11 0700 - 02/12 0659 02/12 0700 - 02/13 0659    P.O. 0      Blood  808.8     NG/GT  60     IV Piggyback 700      TPN 1359.1 1150.3     Total  Intake(mL/kg) 2059.1 (27.3) 2019.1 (26.8)     Urine (mL/kg/hr) 0 (0)      Emesis/NG output 0      Drains 0 0     Other 0      Stool 0      Total Output 0 0     Net +2059.1 +2019.1            Urine Occurrence 2 x      Stool Occurrence 0 x      Emesis Occurrence 0 x            Physical Exam   Constitutional: She appears well-developed and well-nourished. No distress.   HENT:   Head: Normocephalic and atraumatic.   Cardiovascular: Normal rate and regular rhythm.   Pulmonary/Chest: Effort normal. No respiratory distress.   Abdominal:   Soft, appropriate TTP  Open wounds covered with adequate dressing. No signs of infection. LUCIANO drain with serous output.        Significant Labs:  CBC:   Recent Labs   Lab 02/12/19  0510   WBC 12.44   RBC 3.27*   HGB 9.3*   HCT 29.7*   PLT 84*   MCV 91   MCH 28.4   MCHC 31.3*       CMP:   Recent Labs   Lab 02/12/19  0510   *   CALCIUM 9.6   ALBUMIN 1.5*   PROT 5.9*      K 4.3   CO2 26      BUN 44*   CREATININE 0.8   ALKPHOS 673*   ALT 20   AST 37   BILITOT 3.8*     LFTs:   Recent Labs   Lab 02/12/19  0510   ALT 20   AST 37   ALKPHOS 673*   BILITOT 3.8*   PROT 5.9*   ALBUMIN 1.5*     Assessment/Plan:     * Pelvic abscess in female    -S/p ex-lap with drainage of abscess. On antibiotics.  - Vanc completed.   4 weeks total of cipro flagyl   Will discuss recs with ID     Acute blood loss anemia    2 prbc 2/11/19  Responded appropriately     Dysarthria and anarthria    PEG placed 2/11/19     Multiple skin tears    -Wound care following     Depression    -cymbalta     Hypophosphatemia    -Replace as needed     Debility    -PT/OT; history of fall with non displaced oblique right tibia fracture at metaphysis into diaphysis. Was wearing brace.  -Severely debilited       Generalized abdominal pain    64 yo female presenting with abdominal pain, nausea, elevated lactate s/p open drainage pelvic abscess 1/25     -NPO  -start trickle tube feeds today  -wean TPN  -pain/nasuea meds  PRN  -Continue broad spec abx - PO cipro, flagyl - estimated end date 2/26/19; will discuss recs with ID  -WOCN following, appreciate help  -DVT/GI prophylaxis  -PT/OT       Severe malnutrition    -wean TPN  -start trickle tube feeds     Essential hypertension    -diltiazem, lasix     Gastroesophageal reflux disease without esophagitis    -Continue BID IV protonix     (HFpEF) heart failure with preserved ejection fraction    Last echo 8/2018 with no WMAs, grade 1DD, EF 60%  -Strict I/O, daily weights  -Continue diuresis     Moderate asthma without complication    -Continue with scheduled duonebs     Elevated troponin    -Cardiology consulted. On ASA/plavix at home  -ASA     CAD (coronary artery disease)    -ASA  -restart plavix     Type 2 diabetes mellitus    -SSI, appreciate endocrine assistance          GAUTAM BurnsC   [id number]  General Surgery  [facility]

## 2019-02-12 NOTE — SUBJECTIVE & OBJECTIVE
"Interval HPI:   Overnight events:  Transferred to [facility]. Contact precautions. BG at or slightly above goal with correction scale coverage. TPN at 50 cc/hr; weaning TPN. Trickle feeds; to advance. Antibiotics.   Eating:   NPO  Nausea: No  Hypoglycemia and intervention: No  Fever: No  TPN at 50 cc/hr- will decrease to 25 cc/hr; trickle feeds of glucerna at 10 cc/hr (increase by 10 cc/hr every 6 hours to goal of 45 cc/hr).     BP (!) 127/58 (BP Location: Right arm, Patient Position: Lying)   Pulse 78   Temp 98.2 °F (36.8 °C) (Oral)   Resp 16   Ht 5' 1" (1.549 m)   Wt 75.4 kg (166 lb 3.6 oz)   LMP  (LMP Unknown)   SpO2 99%   Breastfeeding? No   BMI 31.41 kg/m²     Labs Reviewed and Include    Recent Labs   Lab 02/12/19  0510   *   CALCIUM 9.6   ALBUMIN 1.5*   PROT 5.9*      K 4.3   CO2 26      BUN 44*   CREATININE 0.8   ALKPHOS 673*   ALT 20   AST 37   BILITOT 3.8*     Lab Results   Component Value Date    WBC 12.44 02/12/2019    HGB 9.3 (L) 02/12/2019    HCT 29.7 (L) 02/12/2019    MCV 91 02/12/2019    PLT 84 (L) 02/12/2019     No results for input(s): TSH, FREET4 in the last 168 hours.  Lab Results   Component Value Date    HGBA1C 4.7 01/24/2019       Nutritional status:   Body mass index is 31.41 kg/m².  Lab Results   Component Value Date    ALBUMIN 1.5 (L) 02/12/2019    ALBUMIN 1.4 (L) 02/11/2019    ALBUMIN 1.4 (L) 02/10/2019     Lab Results   Component Value Date    PREALBUMIN 6 (L) 02/07/2019    PREALBUMIN 6 (L) 02/07/2019    PREALBUMIN 10 (L) 02/04/2019       Estimated Creatinine Clearance: 66.8 mL/min (based on SCr of 0.8 mg/dL).    Accu-Checks  Recent Labs     02/09/19  0823 02/09/19  1154 02/10/19  0024 02/10/19  0918 02/10/19  1149 02/10/19  1542 02/11/19  0024 02/11/19  1414   POCTGLUCOSE 153* 157* 147* 185* 198* 189* 187* 224*       Current Medications and/or Treatments Impacting Glycemic Control  Immunotherapy:    Immunosuppressants     None        Steroids:   Hormones (From " admission, onward)    None        Pressors:    Autonomic Drugs (From admission, onward)    None        Hyperglycemia/Diabetes Medications:   Antihyperglycemics (From admission, onward)    Start     Stop Route Frequency Ordered    02/03/19 1016  insulin aspart U-100 pen 1-10 Units      -- SubQ Every 4 hours PRN 02/03/19 0916

## 2019-02-12 NOTE — ASSESSMENT & PLAN NOTE
BG goal 140 - 180       BG monitoring every 4 hours and moderate dose correction scale.   BG slightly above goal but given weaning of TPN start of TF will continue correction scale coverage at this time.     Discharge planning: TBD

## 2019-02-12 NOTE — ASSESSMENT & PLAN NOTE
64 yo female presenting with abdominal pain, nausea, elevated lactate s/p open drainage pelvic abscess 1/25     -NPO  -start trickle tube feeds today  -wean TPN  -pain/nasuea meds PRN  -Continue broad spec abx - PO cipro, flagyl - estimated end date 2/26/19; will discuss recs with ID  -WOCN following, appreciate help  -DVT/GI prophylaxis  -PT/OT

## 2019-02-12 NOTE — PLAN OF CARE
Problem: Physical Therapy Goal  Goal: Physical Therapy Goal  Goals to be met by: 19    Patient will increase functional independence with mobility by performin. Supine to sit with Moderate Assistance -not met  2. Sit to stand transfer with Maximum Assistance -not met  3. Bed to chair transfer with Maximum Assistance -not met  4. Sitting at edge of bed x10 minutes with Contact Guard Assistance -not met  5. Lower extremity exercise program x10 reps, with assistance as needed - met 2/4       Outcome: Ongoing (interventions implemented as appropriate)  Goals remain appropriate.

## 2019-02-12 NOTE — PLAN OF CARE
02/12/19 1430   Discharge Reassessment   Assessment Type Discharge Planning Reassessment   Provided patient/caregiver education on the expected discharge date and the discharge plan Yes  (D/c date per MD. Not medically stable for discharge, weaning TPN, on Lasix IV. [facility]-expected discharge tomorrow. )   Discharge Plan A Skilled Nursing Facility   Discharge Plan B Skilled Nursing Facility   Anticipated Discharge Disposition SNF   Post-Acute Status   Post-Acute Authorization Placement   Post-Acute Placement Status (Pending medical stability)

## 2019-02-12 NOTE — ASSESSMENT & PLAN NOTE
-S/p ex-lap with drainage of abscess. On antibiotics.  - Vanc completed.   4 weeks total of cipro flagyl   Will discuss recs with ID

## 2019-02-13 LAB
ALBUMIN SERPL BCP-MCNC: 1.4 G/DL
ALP SERPL-CCNC: 1086 U/L
ALT SERPL W/O P-5'-P-CCNC: 22 U/L
ANION GAP SERPL CALC-SCNC: 6 MMOL/L
ANISOCYTOSIS BLD QL SMEAR: SLIGHT
AST SERPL-CCNC: 54 U/L
BASOPHILS # BLD AUTO: 0.06 K/UL
BASOPHILS # BLD AUTO: 0.07 K/UL
BASOPHILS NFR BLD: 0.5 %
BASOPHILS NFR BLD: 0.7 %
BILIRUB SERPL-MCNC: 3.7 MG/DL
BUN SERPL-MCNC: 42 MG/DL
CA-I BLDV-SCNC: 1.31 MMOL/L
CALCIUM SERPL-MCNC: 9.8 MG/DL
CHLORIDE SERPL-SCNC: 110 MMOL/L
CO2 SERPL-SCNC: 26 MMOL/L
CREAT SERPL-MCNC: 0.7 MG/DL
DIFFERENTIAL METHOD: ABNORMAL
DIFFERENTIAL METHOD: ABNORMAL
EOSINOPHIL # BLD AUTO: 1.3 K/UL
EOSINOPHIL # BLD AUTO: 1.4 K/UL
EOSINOPHIL NFR BLD: 12.1 %
EOSINOPHIL NFR BLD: 12.5 %
ERYTHROCYTE [DISTWIDTH] IN BLOOD BY AUTOMATED COUNT: 18.4 %
ERYTHROCYTE [DISTWIDTH] IN BLOOD BY AUTOMATED COUNT: 18.6 %
EST. GFR  (AFRICAN AMERICAN): >60 ML/MIN/1.73 M^2
EST. GFR  (NON AFRICAN AMERICAN): >60 ML/MIN/1.73 M^2
GLUCOSE SERPL-MCNC: 132 MG/DL
HCT VFR BLD AUTO: 31 %
HCT VFR BLD AUTO: 31.9 %
HGB BLD-MCNC: 9.1 G/DL
HGB BLD-MCNC: 9.5 G/DL
HYPOCHROMIA BLD QL SMEAR: ABNORMAL
IMM GRANULOCYTES # BLD AUTO: 0.06 K/UL
IMM GRANULOCYTES # BLD AUTO: 0.08 K/UL
IMM GRANULOCYTES NFR BLD AUTO: 0.6 %
IMM GRANULOCYTES NFR BLD AUTO: 0.7 %
LYMPHOCYTES # BLD AUTO: 2.3 K/UL
LYMPHOCYTES # BLD AUTO: 2.6 K/UL
LYMPHOCYTES NFR BLD: 21.5 %
LYMPHOCYTES NFR BLD: 22.8 %
MAGNESIUM SERPL-MCNC: 1.9 MG/DL
MCH RBC QN AUTO: 27.8 PG
MCH RBC QN AUTO: 27.9 PG
MCHC RBC AUTO-ENTMCNC: 29.4 G/DL
MCHC RBC AUTO-ENTMCNC: 29.8 G/DL
MCV RBC AUTO: 94 FL
MCV RBC AUTO: 95 FL
MONOCYTES # BLD AUTO: 1 K/UL
MONOCYTES # BLD AUTO: 1 K/UL
MONOCYTES NFR BLD: 8.8 %
MONOCYTES NFR BLD: 9.5 %
NEUTROPHILS # BLD AUTO: 5.8 K/UL
NEUTROPHILS # BLD AUTO: 6.3 K/UL
NEUTROPHILS NFR BLD: 55.1 %
NEUTROPHILS NFR BLD: 55.2 %
NRBC BLD-RTO: 0 /100 WBC
NRBC BLD-RTO: 0 /100 WBC
OVALOCYTES BLD QL SMEAR: ABNORMAL
PHOSPHATE SERPL-MCNC: 4 MG/DL
PLATELET # BLD AUTO: 61 K/UL
PLATELET # BLD AUTO: ABNORMAL K/UL
PMV BLD AUTO: 12.2 FL
PMV BLD AUTO: 13.2 FL
POCT GLUCOSE: 105 MG/DL (ref 70–110)
POCT GLUCOSE: 109 MG/DL (ref 70–110)
POCT GLUCOSE: 120 MG/DL (ref 70–110)
POCT GLUCOSE: 155 MG/DL (ref 70–110)
POIKILOCYTOSIS BLD QL SMEAR: SLIGHT
POLYCHROMASIA BLD QL SMEAR: ABNORMAL
POTASSIUM SERPL-SCNC: 4.4 MMOL/L
PROT SERPL-MCNC: 5.5 G/DL
RBC # BLD AUTO: 3.27 M/UL
RBC # BLD AUTO: 3.4 M/UL
SODIUM SERPL-SCNC: 142 MMOL/L
WBC # BLD AUTO: 10.52 K/UL
WBC # BLD AUTO: 11.35 K/UL

## 2019-02-13 PROCEDURE — 63600175 PHARM REV CODE 636 W HCPCS: Performed by: STUDENT IN AN ORGANIZED HEALTH CARE EDUCATION/TRAINING PROGRAM

## 2019-02-13 PROCEDURE — 83735 ASSAY OF MAGNESIUM: CPT

## 2019-02-13 PROCEDURE — 25000003 PHARM REV CODE 250: Performed by: SURGERY

## 2019-02-13 PROCEDURE — 85025 COMPLETE CBC W/AUTO DIFF WBC: CPT | Mod: 91

## 2019-02-13 PROCEDURE — 94799 UNLISTED PULMONARY SVC/PX: CPT

## 2019-02-13 PROCEDURE — 25000003 PHARM REV CODE 250: Performed by: PHYSICIAN ASSISTANT

## 2019-02-13 PROCEDURE — 63600175 PHARM REV CODE 636 W HCPCS: Performed by: SURGERY

## 2019-02-13 PROCEDURE — A4216 STERILE WATER/SALINE, 10 ML: HCPCS | Performed by: SURGERY

## 2019-02-13 PROCEDURE — C9113 INJ PANTOPRAZOLE SODIUM, VIA: HCPCS | Performed by: PHYSICIAN ASSISTANT

## 2019-02-13 PROCEDURE — 27000221 HC OXYGEN, UP TO 24 HOURS

## 2019-02-13 PROCEDURE — 63600175 PHARM REV CODE 636 W HCPCS: Performed by: PHYSICIAN ASSISTANT

## 2019-02-13 PROCEDURE — 20600001 HC STEP DOWN PRIVATE ROOM

## 2019-02-13 PROCEDURE — 82330 ASSAY OF CALCIUM: CPT

## 2019-02-13 PROCEDURE — 84100 ASSAY OF PHOSPHORUS: CPT

## 2019-02-13 PROCEDURE — 94761 N-INVAS EAR/PLS OXIMETRY MLT: CPT

## 2019-02-13 PROCEDURE — 94664 DEMO&/EVAL PT USE INHALER: CPT

## 2019-02-13 PROCEDURE — 80053 COMPREHEN METABOLIC PANEL: CPT

## 2019-02-13 PROCEDURE — 99900035 HC TECH TIME PER 15 MIN (STAT)

## 2019-02-13 RX ADMIN — CIPROFLOXACIN HYDROCHLORIDE 500 MG: 500 TABLET, FILM COATED ORAL at 09:02

## 2019-02-13 RX ADMIN — HYDROMORPHONE HYDROCHLORIDE 1 MG: 1 INJECTION, SOLUTION INTRAMUSCULAR; INTRAVENOUS; SUBCUTANEOUS at 11:02

## 2019-02-13 RX ADMIN — Medication 10 ML: at 12:02

## 2019-02-13 RX ADMIN — MICONAZOLE NITRATE: 20 OINTMENT TOPICAL at 09:02

## 2019-02-13 RX ADMIN — HYDROMORPHONE HYDROCHLORIDE 1 MG: 1 INJECTION, SOLUTION INTRAMUSCULAR; INTRAVENOUS; SUBCUTANEOUS at 03:02

## 2019-02-13 RX ADMIN — HYDROMORPHONE HYDROCHLORIDE 1 MG: 1 INJECTION, SOLUTION INTRAMUSCULAR; INTRAVENOUS; SUBCUTANEOUS at 12:02

## 2019-02-13 RX ADMIN — DULOXETINE 60 MG: 60 CAPSULE, DELAYED RELEASE ORAL at 09:02

## 2019-02-13 RX ADMIN — METRONIDAZOLE 500 MG: 500 TABLET ORAL at 04:02

## 2019-02-13 RX ADMIN — FLUTICASONE FUROATE AND VILANTEROL TRIFENATATE 1 PUFF: 100; 25 POWDER RESPIRATORY (INHALATION) at 09:02

## 2019-02-13 RX ADMIN — HYDROMORPHONE HYDROCHLORIDE 1 MG: 1 INJECTION, SOLUTION INTRAMUSCULAR; INTRAVENOUS; SUBCUTANEOUS at 09:02

## 2019-02-13 RX ADMIN — ASPIRIN 81 MG CHEWABLE TABLET 81 MG: 81 TABLET CHEWABLE at 09:02

## 2019-02-13 RX ADMIN — PANTOPRAZOLE SODIUM 40 MG: 40 INJECTION, POWDER, LYOPHILIZED, FOR SOLUTION INTRAVENOUS at 09:02

## 2019-02-13 RX ADMIN — Medication 10 ML: at 06:02

## 2019-02-13 RX ADMIN — ONDANSETRON 4 MG: 2 INJECTION INTRAMUSCULAR; INTRAVENOUS at 08:02

## 2019-02-13 RX ADMIN — METRONIDAZOLE 500 MG: 500 TABLET ORAL at 02:02

## 2019-02-13 RX ADMIN — CLOPIDOGREL 75 MG: 75 TABLET, FILM COATED ORAL at 09:02

## 2019-02-13 RX ADMIN — DILTIAZEM HYDROCHLORIDE 180 MG: 180 CAPSULE, COATED, EXTENDED RELEASE ORAL at 09:02

## 2019-02-13 RX ADMIN — PANTOPRAZOLE SODIUM 40 MG: 40 INJECTION, POWDER, LYOPHILIZED, FOR SOLUTION INTRAVENOUS at 08:02

## 2019-02-13 RX ADMIN — FUROSEMIDE 40 MG: 40 TABLET ORAL at 05:02

## 2019-02-13 RX ADMIN — HYDROMORPHONE HYDROCHLORIDE 1 MG: 1 INJECTION, SOLUTION INTRAMUSCULAR; INTRAVENOUS; SUBCUTANEOUS at 04:02

## 2019-02-13 NOTE — PLAN OF CARE
Problem: Adult Inpatient Plan of Care  Goal: Plan of Care Review  Outcome: Ongoing (interventions implemented as appropriate)  Blood sugars checked and treated per orders, Tube feedings started and at 20 CC's now and TPN at 25. Patient kept clean and turned as needed. Treated for pain when requested and self suctioned per mouth. Continue with plan of care.

## 2019-02-13 NOTE — PLAN OF CARE
09:15 AM CM was informed by BOY LEMON, [facility]'t have any beds available & have not accepted her as of yet due to she is not partaking in therapy or showing some progression, w/latest PT recs: [facility]. CM noted no one assigned to see patient today. Spoke to Tita, in PT Dept., asking her if PT/OT can see pt either today or in AM for [facility] needs this to determine eligibility. She states she will try to get assigned at least by AM.      1:30 PM Visited patient. AAOX4. CM discussed Discharge Plan A: [facility] not accepted her as of yet due to PT/OT recs are denoting recs for [facility] for she is not participating in therapy. Explained to her the BOY LEMON, talked to her daughter, who said she would tell patient she needs to partake in therapy, showing [facility] she would benefit from short term SNF, 7-10 days. She verbalized her understanding & agrees to try to partake more. (Insurance barrier: patient insurer only has hospital based SNF as a benefit.) Plan B would be NH vs home w/HH, w/her daughter being there 24 hrs/7 days/week.

## 2019-02-13 NOTE — SUBJECTIVE & OBJECTIVE
"Interval HPI:   Overnight events:  Remains in GISSU. NAEON. TF restarted.  Antibiotics. BG below goal without insulin.   Eating:   NPO  Nausea: Yes  Hypoglycemia and intervention: No  Fever: No  TF: glucerna to goal of 45 cc/hr - currently at 30 cc/hr      BP (P) 125/77 (BP Location: Right arm, Patient Position: Lying)   Pulse 83   Temp (P) 98 °F (36.7 °C) (Oral)   Resp 16   Ht 5' 1" (1.549 m)   Wt 75.4 kg (166 lb 3.6 oz)   LMP  (LMP Unknown)   SpO2 98%   Breastfeeding? No   BMI 31.41 kg/m²     Labs Reviewed and Include    Recent Labs   Lab 02/15/19  0408   GLU 83   CALCIUM 9.8   ALBUMIN 1.4*   PROT 5.4*      K 3.8   CO2 31*      BUN 17   CREATININE 0.6   ALKPHOS 1,536*   ALT 27   AST 84*   BILITOT 4.1*     Lab Results   Component Value Date    WBC 9.91 02/14/2019    HGB 9.1 (L) 02/15/2019    HCT 29.8 (L) 02/15/2019    MCV 94 02/15/2019    PLT SEE COMMENT 02/14/2019     No results for input(s): TSH, FREET4 in the last 168 hours.  Lab Results   Component Value Date    HGBA1C 4.7 01/24/2019       Nutritional status:   Body mass index is 31.41 kg/m².  Lab Results   Component Value Date    ALBUMIN 1.4 (L) 02/15/2019    ALBUMIN 1.4 (L) 02/14/2019    ALBUMIN 1.4 (L) 02/13/2019     Lab Results   Component Value Date    PREALBUMIN 5 (L) 02/14/2019    PREALBUMIN 6 (L) 02/07/2019    PREALBUMIN 6 (L) 02/07/2019       Estimated Creatinine Clearance: 89.1 mL/min (based on SCr of 0.6 mg/dL).    Accu-Checks  Recent Labs     02/12/19  2307 02/13/19  0438 02/13/19  0838 02/13/19  1210 02/13/19  1548 02/14/19  0023 02/14/19  0523 02/14/19  1137 02/14/19  1817 02/15/19  0131   POCTGLUCOSE 194* 155* 105 120* 109 88 85 85 76 81       Current Medications and/or Treatments Impacting Glycemic Control  Immunotherapy:    Immunosuppressants     None        Steroids:   Hormones (From admission, onward)    None        Pressors:    Autonomic Drugs (From admission, onward)    None        Hyperglycemia/Diabetes Medications: "   Antihyperglycemics (From admission, onward)    Start     Stop Route Frequency Ordered    02/03/19 1016  insulin aspart U-100 pen 1-10 Units      -- SubQ Every 4 hours PRN 02/03/19 0916

## 2019-02-13 NOTE — PLAN OF CARE
ION learned from Surgery PA, Nunu Cee, that Pt was medically stable to transfer to SNF today. ION spoke to Cathy with [facility] who stated they didn't have a bed today and was unsure if they would tomorrow. However, she said Pt was still not doing much with therapy and that PT had recommended Nursing Home SNF. Cathy said she would follow Pt's therapy notes to see if Pt was able to participate more in the next day or so. ION also spoke to Pt's daughter on the phone to inform her of above. Pt's daughter expressed some frustration over Pt's care and in her option, inaccurate, documentation, etc. ION explained that [facility] would need to see some steady motivation and progress made with therapy in order to accept. ION discussed [facility] SN[facility] with Pt's daughter, but she explained they had try to do that in the recent past and Pt does not have [facility] SN[facility] benefits. SW asked Pt's daughter to please encourage Pt to participate as much as possible in the coming days. SW to continue to follow.     RONNI PadillaW

## 2019-02-13 NOTE — SUBJECTIVE & OBJECTIVE
Interval History:  Patient seen and examined, no acute events overnight  Tolerating tube feeds with minimal nausea/fullness; not yet to goal  Abdominal pain well controlled  +F/BMx3  Afebrile/VSS    Medications:  Continuous Infusions:    Scheduled Meds:   aspirin  81 mg Oral Daily    ciprofloxacin HCl  500 mg Oral Q12H    clopidogrel  75 mg Oral Daily    diltiaZEM  180 mg Oral Daily    DULoxetine  60 mg Oral Daily    enoxaparin  40 mg Subcutaneous Daily    fluticasone-vilanterol  1 puff Inhalation Daily    furosemide  40 mg Oral BID    glucagon (human recombinant)  1 mg Intramuscular Once    metroNIDAZOLE  500 mg Oral Q8H    miconazole nitrate 2%   Topical (Top) BID    pantoprozole (PROTONIX) IV  40 mg Intravenous Q12H    sodium chloride 0.9%  10 mL Intravenous Q6H     PRN Meds:albuterol sulfate, albuterol-ipratropium, dextrose 50%, diclofenac sodium, diphenhydrAMINE, glucagon (human recombinant), hydrALAZINE, HYDROmorphone, insulin aspart U-100, ondansetron, promethazine (PHENERGAN) IVPB, Flushing PICC Protocol **AND** sodium chloride 0.9% **AND** sodium chloride 0.9%, sodium chloride 0.9%, sodium chloride 0.9%, white petrolatum     Review of patient's allergies indicates:   Allergen Reactions    Ace inhibitors Swelling    Carvedilol      Other reaction(s): Other (See Comments)  blister    Hydralazine analogues     Metformin Nausea And Vomiting    Tetracycline Itching    Tetracyclines Swelling    Travatan (with benzalkonium) [travoprost (benzalkonium)]     Travoprost Itching     Other reaction(s): Other (See Comments)  Blurry vision     Objective:     Vital Signs (Most Recent):  Temp: 96.9 °F (36.1 °C) (02/13/19 0451)  Pulse: 86 (02/13/19 0451)  Resp: 17 (02/13/19 0451)  BP: 139/66 (02/13/19 0451)  SpO2: 100 % (02/13/19 0451) Vital Signs (24h Range):  Temp:  [96.9 °F (36.1 °C)-98.2 °F (36.8 °C)] 96.9 °F (36.1 °C)  Pulse:  [73-86] 86  Resp:  [16-20] 17  SpO2:  [94 %-100 %] 100 %  BP:  (113-139)/(54-66) 139/66     Weight: 75.4 kg (166 lb 3.6 oz)  Body mass index is 31.41 kg/m².    Intake/Output - Last 3 Shifts       02/11 0700 - 02/12 0659 02/12 0700 - 02/13 0659 02/13 0700 - 02/14 0659    P.O.       Blood 808.8      NG/GT 60 180     IV Piggyback       TPN 1150.3      Total Intake(mL/kg) 2019.1 (26.8) 180 (2.4)     Urine (mL/kg/hr)  300 (0.2)     Emesis/NG output       Drains 0      Other       Stool       Total Output 0 300     Net +2019.1 -120            Urine Occurrence  3 x     Stool Occurrence  3 x           Physical Exam   Constitutional: She appears well-developed and well-nourished. No distress.   HENT:   Head: Normocephalic and atraumatic.   Cardiovascular: Normal rate and regular rhythm.   Pulmonary/Chest: Effort normal. No respiratory distress.   Abdominal:   Soft, appropriate TTP  Open wounds covered with adequate dressing. No signs of infection       Significant Labs:  CBC:   Recent Labs   Lab 02/13/19  0440   WBC 11.35   RBC 3.40*   HGB 9.5*   HCT 31.9*   PLT 89*   MCV 94   MCH 27.9   MCHC 29.8*       CMP:   Recent Labs   Lab 02/13/19  0440   *   CALCIUM 9.8   ALBUMIN 1.4*   PROT 5.5*      K 4.4   CO2 26      BUN 42*   CREATININE 0.7   ALKPHOS 1,086*   ALT 22   AST 54*   BILITOT 3.7*     LFTs:   Recent Labs   Lab 02/13/19  0440   ALT 22   AST 54*   ALKPHOS 1,086*   BILITOT 3.7*   PROT 5.5*   ALBUMIN 1.4*

## 2019-02-13 NOTE — PROGRESS NOTES
"[facility]  General Surgery  Progress Note    Subjective:     History of Present Illness:  62 yo W with a history of HTN, COPD on home oxygen, HFpEF, IDDMII, CVA on plavix, HTN, debility after fall and broken hip, and PE who presents to the ED with a several month history of nausea, vomiting, inability to tolerate a diet and weight loss. She has had multiple admissions in the past few months for different ailments. She presents today with continued nausea, vomiting and abdominal pain that is mostly worse in the RLQ. During the examination, she states her pain was all over her abdomen because she was retching so much. She states that she has lost close to 40lbs since her surgery and that she only able to keep broth down. She had a lap converted to open appendectomy back in August 2018 that was complicated by a wound infection, C diff and failure to thrive. This seems to persists. She is now wheelchair bound (per her) and apparently lives in [place] but is here in [place] with her daughter.    She had a CT scan in the ED which revealed an irregular shaped rim enhancing fluid collection measuring at least 4.0 x 3.0 cm ight hemipelvis at the site of prior appendectomy. Surgery was consulted for further recommendations. She was also noted to have a "knot" at the RLQ incision site that is also tender.    Post-Op Info:  Procedure(s) (LRB):  LAPAROTOMY, EXPLORATORY (N/A)  INCISION AND DRAINAGE, ABSCESS-  drainage of pelvic abscess (N/A)  EXCISION, ABSCESS- drainage abdominal wall abscess (N/A)  APPLICATION, WOUND VAC (N/A)   19 Days Post-Op     Interval History:  Patient seen and examined, no acute events overnight  Tolerating tube feeds with minimal nausea/fullness; not yet to goal  Abdominal pain well controlled  +F/BMx3  Afebrile/VSS    Medications:  Continuous Infusions:    Scheduled Meds:   aspirin  81 mg Oral Daily    ciprofloxacin HCl  500 mg Oral Q12H    clopidogrel  75 mg Oral Daily    " diltiaZEM  180 mg Oral Daily    DULoxetine  60 mg Oral Daily    enoxaparin  40 mg Subcutaneous Daily    fluticasone-vilanterol  1 puff Inhalation Daily    furosemide  40 mg Oral BID    glucagon (human recombinant)  1 mg Intramuscular Once    metroNIDAZOLE  500 mg Oral Q8H    miconazole nitrate 2%   Topical (Top) BID    pantoprozole (PROTONIX) IV  40 mg Intravenous Q12H    sodium chloride 0.9%  10 mL Intravenous Q6H     PRN Meds:albuterol sulfate, albuterol-ipratropium, dextrose 50%, diclofenac sodium, diphenhydrAMINE, glucagon (human recombinant), hydrALAZINE, HYDROmorphone, insulin aspart U-100, ondansetron, promethazine (PHENERGAN) IVPB, Flushing PICC Protocol **AND** sodium chloride 0.9% **AND** sodium chloride 0.9%, sodium chloride 0.9%, sodium chloride 0.9%, white petrolatum     Review of patient's allergies indicates:   Allergen Reactions    Ace inhibitors Swelling    Carvedilol      Other reaction(s): Other (See Comments)  blister    Hydralazine analogues     Metformin Nausea And Vomiting    Tetracycline Itching    Tetracyclines Swelling    Travatan (with benzalkonium) [travoprost (benzalkonium)]     Travoprost Itching     Other reaction(s): Other (See Comments)  Blurry vision     Objective:     Vital Signs (Most Recent):  Temp: 96.9 °F (36.1 °C) (02/13/19 0451)  Pulse: 86 (02/13/19 0451)  Resp: 17 (02/13/19 0451)  BP: 139/66 (02/13/19 0451)  SpO2: 100 % (02/13/19 0451) Vital Signs (24h Range):  Temp:  [96.9 °F (36.1 °C)-98.2 °F (36.8 °C)] 96.9 °F (36.1 °C)  Pulse:  [73-86] 86  Resp:  [16-20] 17  SpO2:  [94 %-100 %] 100 %  BP: (113-139)/(54-66) 139/66     Weight: 75.4 kg (166 lb 3.6 oz)  Body mass index is 31.41 kg/m².    Intake/Output - Last 3 Shifts       02/11 0700 - 02/12 0659 02/12 0700 - 02/13 0659 02/13 0700 - 02/14 0659    P.O.       Blood 808.8      NG/GT 60 180     IV Piggyback       TPN 1150.3      Total Intake(mL/kg) 2019.1 (26.8) 180 (2.4)     Urine (mL/kg/hr)  300 (0.2)      Emesis/NG output       Drains 0      Other       Stool       Total Output 0 300     Net +2019.1 -120            Urine Occurrence  3 x     Stool Occurrence  3 x           Physical Exam   Constitutional: She appears well-developed and well-nourished. No distress.   HENT:   Head: Normocephalic and atraumatic.   Cardiovascular: Normal rate and regular rhythm.   Pulmonary/Chest: Effort normal. No respiratory distress.   Abdominal:   Soft, appropriate TTP  Open wounds covered with adequate dressing. No signs of infection       Significant Labs:  CBC:   Recent Labs   Lab 02/13/19  0440   WBC 11.35   RBC 3.40*   HGB 9.5*   HCT 31.9*   PLT 89*   MCV 94   MCH 27.9   MCHC 29.8*       CMP:   Recent Labs   Lab 02/13/19  0440   *   CALCIUM 9.8   ALBUMIN 1.4*   PROT 5.5*      K 4.4   CO2 26      BUN 42*   CREATININE 0.7   ALKPHOS 1,086*   ALT 22   AST 54*   BILITOT 3.7*     LFTs:   Recent Labs   Lab 02/13/19  0440   ALT 22   AST 54*   ALKPHOS 1,086*   BILITOT 3.7*   PROT 5.5*   ALBUMIN 1.4*     Assessment/Plan:     * Pelvic abscess in female    -S/p ex-lap with drainage of abscess. On antibiotics.  - Vanc completed.   4 weeks total of cipro flagyl   Will discuss recs with ID     Acute blood loss anemia    2 prbc 2/11/19  Responded appropriately     Dysarthria and anarthria    PEG placed 2/11/19     Multiple skin tears    -Wound care following     Depression    -cymbalta     Hypophosphatemia    -Replace as needed     Debility    -PT/OT; history of fall with non displaced oblique right tibia fracture at metaphysis into diaphysis. Was wearing brace.  -Severely debilited       Generalized abdominal pain    64 yo female presenting with abdominal pain, nausea, elevated lactate s/p open drainage pelvic abscess 1/25     -NPO  -tube feeds  -TPN off  -pain/nasuea meds PRN  -Continue broad spec abx - PO cipro, flagyl - estimated end date 2/26/19  -WOCN following, appreciate help  -DVT/GI prophylaxis  -PT/OT  -stable for  discharge        Severe malnutrition    -TPN off  -tube feeds     Essential hypertension    -diltiazem, PO lasix     Gastroesophageal reflux disease without esophagitis    -Continue BID protonix     (HFpEF) heart failure with preserved ejection fraction    Last echo 8/2018 with no WMAs, grade 1DD, EF 60%  -Strict I/O, daily weights  -Continue diuresis     Moderate asthma without complication    -Continue with scheduled duonebs     Elevated troponin    -Cardiology consulted. On ASA/plavix at home     CAD (coronary artery disease)    -ASA, plavix     Type 2 diabetes mellitus    -SSI, appreciate endocrine assistance          GAUTAM BurnsC   [id number]  General Surgery  [facility]

## 2019-02-13 NOTE — PT/OT/SLP PROGRESS
Speech Language Pathology      Sheryl Martines  MRN: [id number]    SLP attempted to see Pt for dysphagia therapy.  Patient not seen today secondary to Nursing care.  SLP unable to make second attempt later service day.  Will follow-up 2/14/2019.    NAVID Cobb., [facility]SLP  Speech-Language Pathology  Pager: [phone]  2/13/2019

## 2019-02-13 NOTE — CARE UPDATE
BG goal 140 - 180. BG at or slightly above goal overnight with correction scale. BG trending down this AM. TPN off. TF increasing to goal of 45 cc/hr.       BG monitoring every 4 hours and moderate dose correction scale.     ** Please call Endocrine for any BG related issues **

## 2019-02-13 NOTE — PLAN OF CARE
Problem: Adult Inpatient Plan of Care  Goal: Plan of Care Review  POC reviewed with patient and pt. Daughter. AAOX4, VSS. NC 2L. Tele monitor in place, NSR. ABD ML with stapes SURESH, intact. ABD skin tears present, Dressing changed to ABD, interdry,exudry dressing placed. Perineum cleansed with soap and water, miconazole cream applied. Peg tube to LUQ intact, glucerna 40ml/hr infusing. L. Upper arm PICC removed per order. R. Chest port needle change/dressing change completed. Dilaudid given for pain. Pt. Uses bed pan, 1 urine occurrence with 1 pad soaked. 1 sm. BM occurring. NPO diet maintained. BG checks completed Q4, WDL. Foam boots in place. Frequent turning, positioned patient on pillows. Call light within reach, frequent rounding completed. WCTM.

## 2019-02-13 NOTE — ASSESSMENT & PLAN NOTE
62 yo female presenting with abdominal pain, nausea, elevated lactate s/p open drainage pelvic abscess 1/25     -NPO  -tube feeds  -TPN off  -pain/nasuea meds PRN  -Continue broad spec abx - PO cipro, flagyl - estimated end date 2/26/19  -WOCN following, appreciate help  -DVT/GI prophylaxis  -PT/OT  -stable for discharge

## 2019-02-14 LAB
ALBUMIN SERPL BCP-MCNC: 1.4 G/DL
ALP SERPL-CCNC: 1628 U/L
ALT SERPL W/O P-5'-P-CCNC: 26 U/L
ANION GAP SERPL CALC-SCNC: 7 MMOL/L
ANISOCYTOSIS BLD QL SMEAR: SLIGHT
AST SERPL-CCNC: 80 U/L
BASOPHILS # BLD AUTO: 0.05 K/UL
BASOPHILS NFR BLD: 0.5 %
BILIRUB SERPL-MCNC: 4.1 MG/DL
BUN SERPL-MCNC: 28 MG/DL
BURR CELLS BLD QL SMEAR: ABNORMAL
CA-I BLDV-SCNC: 1.3 MMOL/L
CALCIUM SERPL-MCNC: 10 MG/DL
CHLORIDE SERPL-SCNC: 109 MMOL/L
CO2 SERPL-SCNC: 29 MMOL/L
CREAT SERPL-MCNC: 0.6 MG/DL
DACRYOCYTES BLD QL SMEAR: ABNORMAL
DIFFERENTIAL METHOD: ABNORMAL
EOSINOPHIL # BLD AUTO: 1.3 K/UL
EOSINOPHIL NFR BLD: 12.6 %
ERYTHROCYTE [DISTWIDTH] IN BLOOD BY AUTOMATED COUNT: 18.2 %
EST. GFR  (AFRICAN AMERICAN): >60 ML/MIN/1.73 M^2
EST. GFR  (NON AFRICAN AMERICAN): >60 ML/MIN/1.73 M^2
GIANT PLATELETS BLD QL SMEAR: PRESENT
GLUCOSE SERPL-MCNC: 82 MG/DL
HCT VFR BLD AUTO: 31.3 %
HGB BLD-MCNC: 9.3 G/DL
IMM GRANULOCYTES # BLD AUTO: 0.05 K/UL
IMM GRANULOCYTES NFR BLD AUTO: 0.5 %
LYMPHOCYTES # BLD AUTO: 2.8 K/UL
LYMPHOCYTES NFR BLD: 28 %
MAGNESIUM SERPL-MCNC: 1.4 MG/DL
MCH RBC QN AUTO: 28.4 PG
MCHC RBC AUTO-ENTMCNC: 29.7 G/DL
MCV RBC AUTO: 95 FL
MONOCYTES # BLD AUTO: 1.1 K/UL
MONOCYTES NFR BLD: 10.9 %
NEUTROPHILS # BLD AUTO: 4.7 K/UL
NEUTROPHILS NFR BLD: 47.5 %
NRBC BLD-RTO: 0 /100 WBC
PHOSPHATE SERPL-MCNC: 3.6 MG/DL
PLATELET # BLD AUTO: ABNORMAL K/UL
PLATELET BLD QL SMEAR: ABNORMAL
PMV BLD AUTO: ABNORMAL FL
POCT GLUCOSE: 76 MG/DL (ref 70–110)
POCT GLUCOSE: 85 MG/DL (ref 70–110)
POCT GLUCOSE: 85 MG/DL (ref 70–110)
POCT GLUCOSE: 88 MG/DL (ref 70–110)
POIKILOCYTOSIS BLD QL SMEAR: SLIGHT
POLYCHROMASIA BLD QL SMEAR: ABNORMAL
POTASSIUM SERPL-SCNC: 4.3 MMOL/L
PREALB SERPL-MCNC: 5 MG/DL
PROT SERPL-MCNC: 5.6 G/DL
RBC # BLD AUTO: 3.28 M/UL
SODIUM SERPL-SCNC: 145 MMOL/L
WBC # BLD AUTO: 9.91 K/UL

## 2019-02-14 PROCEDURE — 97110 THERAPEUTIC EXERCISES: CPT

## 2019-02-14 PROCEDURE — 82330 ASSAY OF CALCIUM: CPT

## 2019-02-14 PROCEDURE — 97112 NEUROMUSCULAR REEDUCATION: CPT

## 2019-02-14 PROCEDURE — 94761 N-INVAS EAR/PLS OXIMETRY MLT: CPT

## 2019-02-14 PROCEDURE — 20600001 HC STEP DOWN PRIVATE ROOM

## 2019-02-14 PROCEDURE — 80053 COMPREHEN METABOLIC PANEL: CPT

## 2019-02-14 PROCEDURE — 94664 DEMO&/EVAL PT USE INHALER: CPT

## 2019-02-14 PROCEDURE — 25000003 PHARM REV CODE 250: Performed by: SURGERY

## 2019-02-14 PROCEDURE — 63600175 PHARM REV CODE 636 W HCPCS: Performed by: STUDENT IN AN ORGANIZED HEALTH CARE EDUCATION/TRAINING PROGRAM

## 2019-02-14 PROCEDURE — A4216 STERILE WATER/SALINE, 10 ML: HCPCS | Performed by: SURGERY

## 2019-02-14 PROCEDURE — 63600175 PHARM REV CODE 636 W HCPCS: Performed by: SURGERY

## 2019-02-14 PROCEDURE — 63600175 PHARM REV CODE 636 W HCPCS: Performed by: PHYSICIAN ASSISTANT

## 2019-02-14 PROCEDURE — 84134 ASSAY OF PREALBUMIN: CPT

## 2019-02-14 PROCEDURE — 25000003 PHARM REV CODE 250: Performed by: PHYSICIAN ASSISTANT

## 2019-02-14 PROCEDURE — 85025 COMPLETE CBC W/AUTO DIFF WBC: CPT

## 2019-02-14 PROCEDURE — 83735 ASSAY OF MAGNESIUM: CPT

## 2019-02-14 PROCEDURE — 92526 ORAL FUNCTION THERAPY: CPT

## 2019-02-14 PROCEDURE — 84100 ASSAY OF PHOSPHORUS: CPT

## 2019-02-14 PROCEDURE — 99900035 HC TECH TIME PER 15 MIN (STAT)

## 2019-02-14 PROCEDURE — 97530 THERAPEUTIC ACTIVITIES: CPT

## 2019-02-14 PROCEDURE — 27000221 HC OXYGEN, UP TO 24 HOURS

## 2019-02-14 PROCEDURE — C9113 INJ PANTOPRAZOLE SODIUM, VIA: HCPCS | Performed by: PHYSICIAN ASSISTANT

## 2019-02-14 RX ORDER — MAGNESIUM SULFATE HEPTAHYDRATE 40 MG/ML
2 INJECTION, SOLUTION INTRAVENOUS
Status: COMPLETED | OUTPATIENT
Start: 2019-02-14 | End: 2019-02-14

## 2019-02-14 RX ADMIN — METRONIDAZOLE 500 MG: 500 TABLET ORAL at 02:02

## 2019-02-14 RX ADMIN — Medication 10 ML: at 05:02

## 2019-02-14 RX ADMIN — ASPIRIN 81 MG CHEWABLE TABLET 81 MG: 81 TABLET CHEWABLE at 09:02

## 2019-02-14 RX ADMIN — Medication 10 ML: at 12:02

## 2019-02-14 RX ADMIN — DULOXETINE 60 MG: 60 CAPSULE, DELAYED RELEASE ORAL at 09:02

## 2019-02-14 RX ADMIN — PANTOPRAZOLE SODIUM 40 MG: 40 INJECTION, POWDER, LYOPHILIZED, FOR SOLUTION INTRAVENOUS at 09:02

## 2019-02-14 RX ADMIN — HYDROMORPHONE HYDROCHLORIDE 1 MG: 1 INJECTION, SOLUTION INTRAMUSCULAR; INTRAVENOUS; SUBCUTANEOUS at 09:02

## 2019-02-14 RX ADMIN — FUROSEMIDE 40 MG: 40 TABLET ORAL at 09:02

## 2019-02-14 RX ADMIN — FLUTICASONE FUROATE AND VILANTEROL TRIFENATATE 1 PUFF: 100; 25 POWDER RESPIRATORY (INHALATION) at 09:02

## 2019-02-14 RX ADMIN — CIPROFLOXACIN HYDROCHLORIDE 500 MG: 500 TABLET, FILM COATED ORAL at 09:02

## 2019-02-14 RX ADMIN — ONDANSETRON 4 MG: 2 INJECTION INTRAMUSCULAR; INTRAVENOUS at 05:02

## 2019-02-14 RX ADMIN — MICONAZOLE NITRATE: 20 OINTMENT TOPICAL at 09:02

## 2019-02-14 RX ADMIN — FUROSEMIDE 40 MG: 40 TABLET ORAL at 05:02

## 2019-02-14 RX ADMIN — METRONIDAZOLE 500 MG: 500 TABLET ORAL at 10:02

## 2019-02-14 RX ADMIN — CIPROFLOXACIN HYDROCHLORIDE 500 MG: 500 TABLET, FILM COATED ORAL at 08:02

## 2019-02-14 RX ADMIN — MAGNESIUM SULFATE IN WATER 2 G: 40 INJECTION, SOLUTION INTRAVENOUS at 10:02

## 2019-02-14 RX ADMIN — MAGNESIUM SULFATE IN WATER 2 G: 40 INJECTION, SOLUTION INTRAVENOUS at 11:02

## 2019-02-14 RX ADMIN — HYDROMORPHONE HYDROCHLORIDE 1 MG: 1 INJECTION, SOLUTION INTRAMUSCULAR; INTRAVENOUS; SUBCUTANEOUS at 05:02

## 2019-02-14 RX ADMIN — PANTOPRAZOLE SODIUM 40 MG: 40 INJECTION, POWDER, LYOPHILIZED, FOR SOLUTION INTRAVENOUS at 08:02

## 2019-02-14 RX ADMIN — METRONIDAZOLE 500 MG: 500 TABLET ORAL at 08:02

## 2019-02-14 RX ADMIN — METRONIDAZOLE 500 MG: 500 TABLET ORAL at 06:02

## 2019-02-14 RX ADMIN — DILTIAZEM HYDROCHLORIDE 180 MG: 180 CAPSULE, COATED, EXTENDED RELEASE ORAL at 09:02

## 2019-02-14 RX ADMIN — Medication 10 ML: at 11:02

## 2019-02-14 RX ADMIN — CLOPIDOGREL 75 MG: 75 TABLET, FILM COATED ORAL at 09:02

## 2019-02-14 RX ADMIN — HYDROMORPHONE HYDROCHLORIDE 1 MG: 1 INJECTION, SOLUTION INTRAMUSCULAR; INTRAVENOUS; SUBCUTANEOUS at 06:02

## 2019-02-14 RX ADMIN — Medication 10 ML: at 06:02

## 2019-02-14 NOTE — PROGRESS NOTES
"[facility]  General Surgery  Progress Note    Subjective:     History of Present Illness:  64 yo W with a history of HTN, COPD on home oxygen, HFpEF, IDDMII, CVA on plavix, HTN, debility after fall and broken hip, and PE who presents to the ED with a several month history of nausea, vomiting, inability to tolerate a diet and weight loss. She has had multiple admissions in the past few months for different ailments. She presents today with continued nausea, vomiting and abdominal pain that is mostly worse in the RLQ. During the examination, she states her pain was all over her abdomen because she was retching so much. She states that she has lost close to 40lbs since her surgery and that she only able to keep broth down. She had a lap converted to open appendectomy back in August 2018 that was complicated by a wound infection, C diff and failure to thrive. This seems to persists. She is now wheelchair bound (per her) and apparently lives in [place] but is here in [place] with her daughter.    She had a CT scan in the ED which revealed an irregular shaped rim enhancing fluid collection measuring at least 4.0 x 3.0 cm ight hemipelvis at the site of prior appendectomy. Surgery was consulted for further recommendations. She was also noted to have a "knot" at the RLQ incision site that is also tender.    Post-Op Info:  Procedure(s) (LRB):  LAPAROTOMY, EXPLORATORY (N/A)  INCISION AND DRAINAGE, ABSCESS-  drainage of pelvic abscess (N/A)  EXCISION, ABSCESS- drainage abdominal wall abscess (N/A)  APPLICATION, WOUND VAC (N/A)   20 Days Post-Op     Interval History:  Tube feeds held for N/V overnight. Residuals low.   AFVSS  +BMs    Medications:  Continuous Infusions:    Scheduled Meds:   aspirin  81 mg Oral Daily    ciprofloxacin HCl  500 mg Oral Q12H    clopidogrel  75 mg Oral Daily    diltiaZEM  180 mg Oral Daily    DULoxetine  60 mg Oral Daily    fluticasone-vilanterol  1 puff Inhalation Daily "    furosemide  40 mg Oral BID    glucagon (human recombinant)  1 mg Intramuscular Once    metroNIDAZOLE  500 mg Oral Q8H    miconazole nitrate 2%   Topical (Top) BID    pantoprozole (PROTONIX) IV  40 mg Intravenous Q12H    sodium chloride 0.9%  10 mL Intravenous Q6H     PRN Meds:albuterol sulfate, albuterol-ipratropium, dextrose 50%, diclofenac sodium, diphenhydrAMINE, glucagon (human recombinant), hydrALAZINE, HYDROmorphone, insulin aspart U-100, ondansetron, promethazine (PHENERGAN) IVPB, Flushing PICC Protocol **AND** sodium chloride 0.9% **AND** sodium chloride 0.9%, sodium chloride 0.9%, sodium chloride 0.9%, white petrolatum     Review of patient's allergies indicates:   Allergen Reactions    Ace inhibitors Swelling    Carvedilol      Other reaction(s): Other (See Comments)  blister    Hydralazine analogues     Metformin Nausea And Vomiting    Tetracycline Itching    Tetracyclines Swelling    Travatan (with benzalkonium) [travoprost (benzalkonium)]     Travoprost Itching     Other reaction(s): Other (See Comments)  Blurry vision     Objective:     Vital Signs (Most Recent):  Temp: 98.7 °F (37.1 °C) (02/14/19 0432)  Pulse: 102 (02/14/19 0432)  Resp: 19 (02/14/19 0432)  BP: (!) 145/70 (02/14/19 0432)  SpO2: 96 % (02/14/19 0432) Vital Signs (24h Range):  Temp:  [97.6 °F (36.4 °C)-99.4 °F (37.4 °C)] 98.7 °F (37.1 °C)  Pulse:  [] 102  Resp:  [17-20] 19  SpO2:  [95 %-100 %] 96 %  BP: (134-158)/(67-73) 145/70     Weight: 75.4 kg (166 lb 3.6 oz)  Body mass index is 31.41 kg/m².    Intake/Output - Last 3 Shifts       02/12 0700 - 02/13 0659 02/13 0700 - 02/14 0659    NG/ 920    Total Intake(mL/kg) 180 (2.4) 920 (12.2)    Urine (mL/kg/hr) 300 (0.2) 0 (0)    Emesis/NG output  400    Total Output 300 400    Net -120 +520          Urine Occurrence 3 x 3 x    Stool Occurrence 3 x 1 x          Physical Exam   Constitutional: She appears well-developed and well-nourished. No distress.   HENT:   Head:  Normocephalic and atraumatic.   Cardiovascular: Normal rate and regular rhythm.   Pulmonary/Chest: Effort normal. No respiratory distress.   Abdominal:   Soft, appropriate TTP  Open wounds covered with adequate dressing. No signs of infection       Significant Labs:  CBC:   Recent Labs   Lab 02/13/19  1005 02/14/19  0400   WBC 10.52 9.91   RBC 3.27* 3.28*   HGB 9.1* 9.3*   HCT 31.0* 31.3*   PLT 61*  --    MCV 95 95   MCH 27.8 28.4   MCHC 29.4* 29.7*       CMP:   Recent Labs   Lab 02/14/19  0400   GLU 82   CALCIUM 10.0   ALBUMIN 1.4*   PROT 5.6*      K 4.3   CO2 29      BUN 28*   CREATININE 0.6   ALKPHOS 1,628*   ALT 26   AST 80*   BILITOT 4.1*     LFTs:   Recent Labs   Lab 02/14/19  0400   ALT 26   AST 80*   ALKPHOS 1,628*   BILITOT 4.1*   PROT 5.6*   ALBUMIN 1.4*     Assessment/Plan:     * Pelvic abscess in female    -S/p ex-lap with drainage of abscess. On antibiotics.  - Vanc completed.   4 weeks total of cipro flagyl   Will discuss recs with ID     Acute blood loss anemia    2 prbc 2/11/19  Responded appropriately     Dysarthria and anarthria    PEG placed 2/11/19     Multiple skin tears    -Wound care following     Depression    -cymbalta     Hypophosphatemia    -Replace as needed     Debility    -PT/OT; history of fall with non displaced oblique right tibia fracture at metaphysis into diaphysis. Was wearing brace.  -Severely debilited       Generalized abdominal pain    64 yo female presenting with abdominal pain, nausea, elevated lactate s/p open drainage pelvic abscess 1/25     -NPO  -tube feeds  -TPN off  -pain/nasuea meds PRN  -Continue broad spec abx - PO cipro, flagyl - estimated end date 2/26/19  -WOCN following, appreciate help  -DVT/GI prophylaxis  -PT/OT  -stable for discharge        Severe malnutrition    -TPN off  - re start tube feeds at low rate and titrate up     Essential hypertension    -diltiazem, PO lasix     Gastroesophageal reflux disease without esophagitis    -Continue BID  protonix     (HFpEF) heart failure with preserved ejection fraction    Last echo 8/2018 with no WMAs, grade 1DD, EF 60%  -Strict I/O, daily weights  -Continue diuresis     Moderate asthma without complication    -Continue with scheduled duonebs     Elevated troponin    -Cardiology consulted. On ASA/plavix at home     CAD (coronary artery disease)    -ASA, plavix     Type 2 diabetes mellitus    -SSI, appreciate endocrine assistance          Sudhakar López MD  General Surgery  [facility]

## 2019-02-14 NOTE — ED NOTES
Pt turned on left side  Sponge wedge applied to back for support   [Initial Visit] : initial GYN visit [FreeTextEntry1] : Check up  Without complaint  Last gyn exam 7/2017

## 2019-02-14 NOTE — PLAN OF CARE
Pt had nausea and vomiting overnight. [facility] is continuing to follow, but they need updated PT and OT notes. Cathy with [facility] following Pt.     Erma Godinez, MAURILIO        02/14/19 1015   Post-Acute Status   Post-Acute Authorization Placement   Post-Acute Placement Status Pending Medical Review

## 2019-02-14 NOTE — PLAN OF CARE
Problem: SLP Goal  Goal: SLP Goal  Speech Language Pathology Goals  Goals expected to be met by 2/13/2019- extended to 2/20/2019  1. Pt will participate in further, instrumental assessment via MBSS   2. Educate Pt and family on S/S aspiration and aspiration precautions   3. Pt will complete dysphagia exercises x10 ea with good effort 90% of attempts to improve BOT coordination and pharyngeal strength/coordination.     Goals expected to be met by 2/6/19  1. Pt will participate in ongoing swallow assessment  2. Educate Pt and family on S/S aspiration and aspiration precautions           Outcome: Ongoing (interventions implemented as appropriate)  Pt sen for dysphagia therapy. Current goals remain ongoing. Good frequent oral care advised. ST to continue to follow    NAVID Cobb., [facility]-SLP  Speech-Language Pathology  Pager: [phone]  2/14/2019

## 2019-02-14 NOTE — PLAN OF CARE
Problem: Pain Acute  Goal: Optimal Pain Control    Intervention: Prevent or Manage Pain  Patient in bed, alert and oriented at this time. Patient complaining of nausea and vomiting at 2000. Patients daughter at bedside, comforting patient, patient very anxious and workup. Patient incontinent at this time, CNA cleaning patient tube feeding turned off and flushed at this time. 60 cc of old tubefeeding and undigested mucous. Patient medicated with zofran 4mg IVP and Protonix 40 mg IVP x GI  Upset at this time. Patient resting for  Awhile. Patient then complained of severe pain generalized over entire body. Blood sugar checked at this time, 88. Medicated with Dilaudid 1 mg IVP, Patient sleeping and resting at this time.  Will continue to observe as needed. Continue Plan of care, call light within reach, siderails up.

## 2019-02-14 NOTE — PT/OT/SLP PROGRESS
Occupational Therapy   Treatment    Name: Sheryl Martines  MRN: [id number]  Admitting Diagnosis:  Pelvic abscess in female  20 Days Post-Op    Recommendations:     Discharge Recommendations: nursing facility, skilled  Discharge Equipment Recommendations:  none  Barriers to discharge:  Decreased caregiver support    Assessment:     Sheryl Martines is a 63 y.o. female with a medical diagnosis of Pelvic abscess in female.  She presents with pain with BLE movement impairing mobility. Pt demo low tolerance for EOB sitting. ROM deficits in BUE impairing self care at EOB. Performance deficits affecting function are weakness, impaired endurance, impaired balance, gait instability, impaired self care skills, impaired functional mobilty, decreased lower extremity function, decreased upper extremity function, decreased ROM, pain, impaired cardiopulmonary response to activity.     Rehab Prognosis:  Fair; patient would benefit from acute skilled OT services to address these deficits and reach maximum level of function.       Plan:     Patient to be seen 2 x/week to address the above listed problems via self-care/home management, therapeutic activities, therapeutic exercises  · Plan of Care Expires: 02/28/19  · Plan of Care Reviewed with: patient, daughter    Subjective     Pain/Comfort:  · Pain Rating 1: (Pt did not rate, but c/o pain in BLE)  · Location - Side 1: Bilateral  · Location - Orientation 1: generalized  · Location 1: leg  · Pain Addressed 1: Reposition, Cessation of Activity, Distraction  · Pain Rating Post-Intervention 1: (Pt did not rate, no pain stated when returned to supine)    Objective:     Communicated with: RN prior to session.  Patient found HOB elevated with PEG Tube, oxygen, PICC line, pressure relief boots upon OT entry to room.    General Precautions: Standard, fall, contact, aspiration   Orthopedic Precautions:N/A   Braces: N/A     Occupational Performance:     Bed Mobility:    · Patient completed  Scooting/Bridging with total assistance to scoot to EOB and total assistance x 2 persons to drawsheet to HOB  · Patient completed Supine to Sit with maximal assistance  · Patient completed Sit to Supine with maximal assistance   · Pt seated EOB ~5 min with CGA/min A for balance; VC needed to correct L lateral lean    Functional Mobility/Transfers:  · NT; Pt not appropriate this date 2/2 low tolerance of EOB     Activities of Daily Living:  · Grooming: maximal assistance to comb hair while seated EOB      AMPAC 6 Click ADL: 9    Treatment & Education:  BUE AAROM exercises 10 x 1 for shoulder flex, elbow flex/ext, and wrist flexion while supine with HOB elevated  BLE AAROM exercises 10 x 1 for knee flex/ext, hip abd/add, and ankle pumps while supine with HOB elevated  Pt educated on role of OT/POC  Pt educated on importance of EOB activity and UE/LE exercises; UE HEP left in room  White board/communication board updated    Patient left HOB elevated with all lines intact and call button in reachEducation:      GOALS:   Multidisciplinary Problems     Occupational Therapy Goals        Problem: Occupational Therapy Goal    Goal Priority Disciplines Outcome Interventions   Occupational Therapy Goal     OT, PT/OT Ongoing (interventions implemented as appropriate)    Description:  Goals to be met by: 2/21/19; revised from 2/12/19     Patient will increase functional independence with ADLs by performing:    Feeding with Set-up Assistance. - MET 2/9  UE Dressing with Set-up Assistance.  LE Dressing with Moderate Assistance.  Grooming while seated with Set-up Assistance.  Toileting from bedside commode with Maximum Assistance for hygiene and clothing management.   Toilet transfer to bedside commode with Maximum Assistance.                        Time Tracking:     OT Date of Treatment: 02/14/19  OT Start Time: 1029  OT Stop Time: 1052  OT Total Time (min): 23 min    Billable Minutes:Therapeutic Exercise 15  Neuromuscular  Re-education 8    Chyna Dahl, OT  2/14/2019

## 2019-02-14 NOTE — PLAN OF CARE
Problem: Adult Inpatient Plan of Care  Goal: Plan of Care Review  Outcome: Ongoing (interventions implemented as appropriate)  Glucerna 1.5 at 30 CC's, increasing to goal of 45 with no residuals, blood sugars tested and no coverage needed. Remains on 2 liters of oxygen. Wound care treated patient today, Pure Wick Placed and return of clear yellow urine. Normal sinus rhythm per telemetry. Therapy worked with patient and instructed family. Continue with plan of care.

## 2019-02-14 NOTE — PLAN OF CARE
Problem: Occupational Therapy Goal  Goal: Occupational Therapy Goal  Goals to be met by: 2/21/19; revised from 2/12/19     Patient will increase functional independence with ADLs by performing:    Feeding with Set-up Assistance. - MET 2/9  UE Dressing with Set-up Assistance.  LE Dressing with Moderate Assistance.  Grooming while seated with Set-up Assistance.  Toileting from bedside commode with Maximum Assistance for hygiene and clothing management.   Toilet transfer to bedside commode with Maximum Assistance.      Outcome: Ongoing (interventions implemented as appropriate)  Pt continues to work toward remaining goals    Comments: Continue OT GURINDER Dahl OT  2/14/2019

## 2019-02-14 NOTE — PLAN OF CARE
Problem: Adult Inpatient Plan of Care  Goal: Plan of Care Review      Recommendations    Recommendation/Intervention:        1.Continue advancing TF of Glucerna 1.5 to goal rate of 45 mL/hr to provide 1620 kcal, 89 gm protein, and 820 mL free fluid.               - If bolus desired, recommend TF of Glucerna 1.5 x 4.5 cans/day to provide 1602 kcal, 88 gm protein, and 810 mL free fluid.     2. If TF formula change warranted, recommend TF of Peptamen 1.5 with Prebio advancing as tolerated to goal rate of 50 mL/hr to provide 1800 kcal, 82 gm protein, and 924 mL free fluid.              - If bolus desired, recommend Peptamen 1.5 with Prebio x 5 cans/day to provide 1875 kcal, 87 gm protein, and 965mL free fluid.      3. When able to advance diet, ADAT to Diabetic with texture per SLP.     4. RD following.     Goals: Patient to meet > 85% EEN and EPN  Nutrition Goal Status: progressing towards goal

## 2019-02-14 NOTE — PROGRESS NOTES
"[facility]  Adult Nutrition  Progress Note    SUMMARY       Recommendations    Recommendation/Intervention:        1.Continue advancing TF of Glucerna 1.5 to goal rate of 45 mL/hr to provide 1620 kcal, 89 gm protein, and 820 mL free fluid.               - If bolus desired, recommend TF of Glucerna 1.5 x 4.5 cans/day to provide 1602 kcal, 88 gm protein, and 810 mL free fluid.     2. If TF formula change warranted, recommend TF of Peptamen 1.5 with Prebio advancing as tolerated to goal rate of 50 mL/hr to provide 1800 kcal, 82 gm protein, and 924 mL free fluid.              - If bolus desired, recommend Peptamen 1.5 with Prebio x 5 cans/day to provide 1875 kcal, 87 gm protein, and 965mL free fluid.      3. When able to advance diet, ADAT to Diabetic with texture per SLP.     4. RD following.     Goals: Patient to meet > 85% EEN and EPN  Nutrition Goal Status: progressing towards goal  Communication of RD Recs: (POC)    Reason for Assessment    Reason For Assessment: RD follow-up  Diagnosis: infection/sepsis(pelvic abscess)  Relevant Medical History: COPD, CVA, DM, HTN, HLD  Interdisciplinary Rounds: attended  General Information Comments: S/p ex-lap with drainage of abscess. Family member at bedside reports N/V last night. NPO, TF held overnight 2/2 N/V. TF currently at 10 mL/hr. NFPE completed 1/28, 2/14. Pt continues with mild muscle/fat wasting. Pt with moderate malnutrition.   Nutrition Discharge Planning: unable to determine at this time    Nutrition Risk Screen    Nutrition Risk Screen: tube feeding or parenteral nutrition    Nutrition/Diet History    Typical Food/Fluid Intake: Per MD note, patient reported poor PO itnake PTA 2/2 abdominal pain and N/V.  Spiritual, Cultural Beliefs, Rastafari Practices, Values that Affect Care: no  Factors Affecting Nutritional Intake: NPO    Anthropometrics    Temp: 97.3 °F (36.3 °C)  Height: 5' 1" (154.9 cm)  Height (inches): 61 in  Weight Method: Bed " Scale  Weight: 75.4 kg (166 lb 3.6 oz)  Weight (lb): 166.23 lb  Ideal Body Weight (IBW), Female: 105 lb  % Ideal Body Weight, Female (lb): 149.52 lb  BMI (Calculated): 29.7  BMI Grade: 25 - 29.9 - overweight  Usual Body Weight (UBW), k.6 kg(per chart review 2018)  % Usual Body Weight: 87.46  % Weight Change From Usual Weight: -12.73 %    Lab/Procedures/Meds    Pertinent Labs Reviewed: reviewed  Pertinent Labs Comments: BUN 28, Mg 1.4, alk phos 1628, T bili 4.1, AST 80  Pertinent Medications Reviewed: reviewed  Pertinent Medications Comments: aspirin, clopidogrel, lasix    Estimated/Assessed Needs    Weight Used For Calorie Calculations: 71.2 kg (156 lb 15.5 oz)  Energy Calorie Requirements (kcal): 1505 kcal/day  Energy Need Method: [place]-St Jeor(x 1.25)  Protein Requirements:  g/day(1.2-1.4 g/kg)  Weight Used For Protein Calculations: 71.2 kg (156 lb 15.5 oz)  Fluid Requirements (mL): 1 mL/kcal or per MD  Estimated Fluid Requirement Method: RDA Method  RDA Method (mL): 1505  CHO Requirement: 50% total kcal    Nutrition Prescription Ordered    Current Diet Order: NPO  Current Nutrition Support Formula Ordered: Glucerna 1.5  Current Nutrition Support Rate Ordered: 10 (ml)  Current Nutrition Support Frequency Ordered: mL/hr    Evaluation of Received Nutrient/Fluid Intake    Energy Calories Required: not meeting needs  Protein Required: not meeting needs  Fluid Required: (per MD)  Comments: LBM   Tolerance: tolerating  % Intake of Estimated Energy Needs: 25 - 50 %  % Meal Intake: NPO    Nutrition Risk    Level of Risk/Frequency of Follow-up: (f/u 2 x wk)     Assessment and Plan    Moderate malnutrition    Contributing Nutrition Diagnosis  Malnutrition in the context of Acute Illness/Injury    Related to (etiology):  Decreased PO intake 2/2 abdominal pain and N/V    Signs and Symptoms (as evidenced by):  Energy Intake: <75% of estimated energy requirement for several months per patient  Body Fat  Depletion: mild depletion of orbitals and triceps   Muscle Mass Depletion: mild and moderate depletion of temples, clavicle region and lower extremities   Weight Loss: 12.7% x 5 months   Fluid Accumulation: mild and moderate    Interventions/Recommendations (treatment strategy):  Collaboration and referral of nutrition care     Nutrition Diagnosis Status:  Continues          Monitor and Evaluation    Food and Nutrient Intake: energy intake, enteral nutrition intake  Food and Nutrient Adminstration: enteral and parenteral nutrition administration  Anthropometric Measurements: weight, weight change  Biochemical Data, Medical Tests and Procedures: electrolyte and renal panel, gastrointestinal profile, glucose/endocrine profile, inflammatory profile  Nutrition-Focused Physical Findings: overall appearance     Malnutrition Assessment                 Orbital Region (Subcutaneous Fat Loss): mild depletion  Upper Arm Region (Subcutaneous Fat Loss): mild depletion   [place] Region (Muscle Loss): moderate depletion  Clavicle Bone Region (Muscle Loss): mild depletion  Anterior Thigh Region (Muscle Loss): mild depletion   Edema (Fluid Accumulation): 2-->mild             Nutrition Follow-Up    RD Follow-up?: Yes

## 2019-02-14 NOTE — SUBJECTIVE & OBJECTIVE
Interval History:  Tube feeds held for N/V overnight. Residuals low.   AFVSS  +BMs    Medications:  Continuous Infusions:    Scheduled Meds:   aspirin  81 mg Oral Daily    ciprofloxacin HCl  500 mg Oral Q12H    clopidogrel  75 mg Oral Daily    diltiaZEM  180 mg Oral Daily    DULoxetine  60 mg Oral Daily    fluticasone-vilanterol  1 puff Inhalation Daily    furosemide  40 mg Oral BID    glucagon (human recombinant)  1 mg Intramuscular Once    metroNIDAZOLE  500 mg Oral Q8H    miconazole nitrate 2%   Topical (Top) BID    pantoprozole (PROTONIX) IV  40 mg Intravenous Q12H    sodium chloride 0.9%  10 mL Intravenous Q6H     PRN Meds:albuterol sulfate, albuterol-ipratropium, dextrose 50%, diclofenac sodium, diphenhydrAMINE, glucagon (human recombinant), hydrALAZINE, HYDROmorphone, insulin aspart U-100, ondansetron, promethazine (PHENERGAN) IVPB, Flushing PICC Protocol **AND** sodium chloride 0.9% **AND** sodium chloride 0.9%, sodium chloride 0.9%, sodium chloride 0.9%, white petrolatum     Review of patient's allergies indicates:   Allergen Reactions    Ace inhibitors Swelling    Carvedilol      Other reaction(s): Other (See Comments)  blister    Hydralazine analogues     Metformin Nausea And Vomiting    Tetracycline Itching    Tetracyclines Swelling    Travatan (with benzalkonium) [travoprost (benzalkonium)]     Travoprost Itching     Other reaction(s): Other (See Comments)  Blurry vision     Objective:     Vital Signs (Most Recent):  Temp: 98.7 °F (37.1 °C) (02/14/19 0432)  Pulse: 102 (02/14/19 0432)  Resp: 19 (02/14/19 0432)  BP: (!) 145/70 (02/14/19 0432)  SpO2: 96 % (02/14/19 0432) Vital Signs (24h Range):  Temp:  [97.6 °F (36.4 °C)-99.4 °F (37.4 °C)] 98.7 °F (37.1 °C)  Pulse:  [] 102  Resp:  [17-20] 19  SpO2:  [95 %-100 %] 96 %  BP: (134-158)/(67-73) 145/70     Weight: 75.4 kg (166 lb 3.6 oz)  Body mass index is 31.41 kg/m².    Intake/Output - Last 3 Shifts       02/12 0700 - 02/13 0659  02/13 0700 - 02/14 0659    NG/ 920    Total Intake(mL/kg) 180 (2.4) 920 (12.2)    Urine (mL/kg/hr) 300 (0.2) 0 (0)    Emesis/NG output  400    Total Output 300 400    Net -120 +520          Urine Occurrence 3 x 3 x    Stool Occurrence 3 x 1 x          Physical Exam   Constitutional: She appears well-developed and well-nourished. No distress.   HENT:   Head: Normocephalic and atraumatic.   Cardiovascular: Normal rate and regular rhythm.   Pulmonary/Chest: Effort normal. No respiratory distress.   Abdominal:   Soft, appropriate TTP  Open wounds covered with adequate dressing. No signs of infection       Significant Labs:  CBC:   Recent Labs   Lab 02/13/19  1005 02/14/19  0400   WBC 10.52 9.91   RBC 3.27* 3.28*   HGB 9.1* 9.3*   HCT 31.0* 31.3*   PLT 61*  --    MCV 95 95   MCH 27.8 28.4   MCHC 29.4* 29.7*       CMP:   Recent Labs   Lab 02/14/19  0400   GLU 82   CALCIUM 10.0   ALBUMIN 1.4*   PROT 5.6*      K 4.3   CO2 29      BUN 28*   CREATININE 0.6   ALKPHOS 1,628*   ALT 26   AST 80*   BILITOT 4.1*     LFTs:   Recent Labs   Lab 02/14/19  0400   ALT 26   AST 80*   ALKPHOS 1,628*   BILITOT 4.1*   PROT 5.6*   ALBUMIN 1.4*

## 2019-02-14 NOTE — PT/OT/SLP PROGRESS
"Speech Language Pathology Treatment    Patient Name:  Sheryl Martines   MRN:  [id number]  Admitting Diagnosis: Pelvic abscess in female    Recommendations:                 General Recommendations:  Dysphagia therapy  Diet recommendations:  NPO, Liquid Diet Level: NPO   Aspiration Precautions: Continue alternate means of nutrition, Frequent oral care and Standard aspiration precautions   General Precautions: Standard, aspiration, fall, contact  Communication strategies:  provide increased time to answer and go to room if call light pushed    Subjective     Pt presents lethargic  She explains, "yes" when asked about oral care  She denies pain, endorses fatigue    Pain/Comfort:  · Pain Rating 1: 0/10    Objective:     Has the patient been evaluated by SLP for swallowing?   Yes  Keep patient NPO? Yes   Current Respiratory Status: room air      Pt seen for dysphagia therapy. She was found asleep in bed at 40 degree angle with call light in reach. She woke per simple verbal cues. She reported lethargy following OT prior to ST session. She accepted oral care via toothette; however, upon SLP attempts to provide oral care endorsed pain when toothette reach labial surface. Pt educated on SLP role, dysphagia exercises; however, easily fell asleep during SLP education.  Pt woke per moderate verbal cues and completed reps of lingual protrusion and falsetto I/ x5 ea provided moderate verbal cues and redirection 2/2 lethargy, then fell back into deep sleep state. PO trials of ice chips and Additional exercises held 2/2 lethargy. No questions noted. Whiteboard updated. Pt remaineddin bed at 40 degree angle with call light in reach upon SLP exit from room.  No family present in room.     Assessment:     Sheryl Martines is a 63 y.o. female with an SLP diagnosis of Dysphagia.  She presents with lethargy and remains ongoing with current goals.     Goals:   Multidisciplinary Problems     SLP Goals        Problem: SLP Goal    Goal " Priority Disciplines Outcome   SLP Goal     SLP Ongoing (interventions implemented as appropriate)   Description:  Speech Language Pathology Goals  Goals expected to be met by 2/13/2019- extended to 2/20/2019  1. Pt will participate in further, instrumental assessment via MBSS   2. Educate Pt and family on S/S aspiration and aspiration precautions   3. Pt will complete dysphagia exercises x10 ea with good effort 90% of attempts to improve BOT coordination and pharyngeal strength/coordination.     Goals expected to be met by 2/6/19  1. Pt will participate in ongoing swallow assessment  2. Educate Pt and family on S/S aspiration and aspiration precautions                           Plan:     · Patient to be seen:  3 x/week   · Plan of Care expires:  03/01/19  · Plan of Care reviewed with:  patient   · SLP Follow-Up:  Yes       Discharge recommendations:  nursing facility, skilled     Time Tracking:     SLP Treatment Date:   02/14/19  Speech Start Time:  1056  Speech Stop Time:  1112     Speech Total Time (min):  16 min    Billable Minutes: Treatment Swallowing Dysfunction 16    RAJWINDER Cobb, [facility]-SLP  Speech-Language Pathology  Pager: [phone]      02/14/2019

## 2019-02-15 PROBLEM — R74.8 ELEVATED LIVER ENZYMES: Status: ACTIVE | Noted: 2019-02-15

## 2019-02-15 PROBLEM — Z78.9 ON TOTAL PARENTERAL NUTRITION (TPN): Status: RESOLVED | Noted: 2019-02-03 | Resolved: 2019-02-15

## 2019-02-15 LAB
ALBUMIN SERPL BCP-MCNC: 1.4 G/DL
ALP SERPL-CCNC: 1536 U/L
ALT SERPL W/O P-5'-P-CCNC: 27 U/L
ANION GAP SERPL CALC-SCNC: 6 MMOL/L
AST SERPL-CCNC: 84 U/L
BASOPHILS # BLD AUTO: 0.05 K/UL
BASOPHILS NFR BLD: 0.6 %
BILIRUB SERPL-MCNC: 4.1 MG/DL
BUN SERPL-MCNC: 17 MG/DL
CA-I BLDV-SCNC: 1.3 MMOL/L
CALCIUM SERPL-MCNC: 9.8 MG/DL
CHLORIDE SERPL-SCNC: 106 MMOL/L
CO2 SERPL-SCNC: 31 MMOL/L
CREAT SERPL-MCNC: 0.6 MG/DL
DIFFERENTIAL METHOD: ABNORMAL
EOSINOPHIL # BLD AUTO: 1.6 K/UL
EOSINOPHIL NFR BLD: 18.4 %
ERYTHROCYTE [DISTWIDTH] IN BLOOD BY AUTOMATED COUNT: 18 %
EST. GFR  (AFRICAN AMERICAN): >60 ML/MIN/1.73 M^2
EST. GFR  (NON AFRICAN AMERICAN): >60 ML/MIN/1.73 M^2
GLUCOSE SERPL-MCNC: 83 MG/DL
HCT VFR BLD AUTO: 29.8 %
HGB BLD-MCNC: 9.1 G/DL
IMM GRANULOCYTES # BLD AUTO: 0.02 K/UL
IMM GRANULOCYTES NFR BLD AUTO: 0.2 %
LYMPHOCYTES # BLD AUTO: 2.7 K/UL
LYMPHOCYTES NFR BLD: 30.2 %
MAGNESIUM SERPL-MCNC: 1.4 MG/DL
MCH RBC QN AUTO: 28.8 PG
MCHC RBC AUTO-ENTMCNC: 30.5 G/DL
MCV RBC AUTO: 94 FL
MONOCYTES # BLD AUTO: 1 K/UL
MONOCYTES NFR BLD: 11.1 %
NEUTROPHILS # BLD AUTO: 3.5 K/UL
NEUTROPHILS NFR BLD: 39.5 %
NRBC BLD-RTO: 0 /100 WBC
PHOSPHATE SERPL-MCNC: 4.1 MG/DL
PLATELET # BLD AUTO: ABNORMAL K/UL
PMV BLD AUTO: ABNORMAL FL
POCT GLUCOSE: 108 MG/DL (ref 70–110)
POCT GLUCOSE: 117 MG/DL (ref 70–110)
POCT GLUCOSE: 122 MG/DL (ref 70–110)
POCT GLUCOSE: 128 MG/DL (ref 70–110)
POCT GLUCOSE: 81 MG/DL (ref 70–110)
POTASSIUM SERPL-SCNC: 3.8 MMOL/L
PROT SERPL-MCNC: 5.4 G/DL
RBC # BLD AUTO: 3.16 M/UL
SODIUM SERPL-SCNC: 143 MMOL/L
WBC # BLD AUTO: 8.8 K/UL

## 2019-02-15 PROCEDURE — 27000646 HC AEROBIKA DEVICE

## 2019-02-15 PROCEDURE — 97530 THERAPEUTIC ACTIVITIES: CPT

## 2019-02-15 PROCEDURE — 20600001 HC STEP DOWN PRIVATE ROOM

## 2019-02-15 PROCEDURE — 99231 SBSQ HOSP IP/OBS SF/LOW 25: CPT | Mod: ,,, | Performed by: NURSE PRACTITIONER

## 2019-02-15 PROCEDURE — 25000003 PHARM REV CODE 250: Performed by: SURGERY

## 2019-02-15 PROCEDURE — 27000221 HC OXYGEN, UP TO 24 HOURS

## 2019-02-15 PROCEDURE — 94799 UNLISTED PULMONARY SVC/PX: CPT

## 2019-02-15 PROCEDURE — C9113 INJ PANTOPRAZOLE SODIUM, VIA: HCPCS | Performed by: PHYSICIAN ASSISTANT

## 2019-02-15 PROCEDURE — 94664 DEMO&/EVAL PT USE INHALER: CPT

## 2019-02-15 PROCEDURE — 63600175 PHARM REV CODE 636 W HCPCS: Performed by: STUDENT IN AN ORGANIZED HEALTH CARE EDUCATION/TRAINING PROGRAM

## 2019-02-15 PROCEDURE — 97110 THERAPEUTIC EXERCISES: CPT

## 2019-02-15 PROCEDURE — A4216 STERILE WATER/SALINE, 10 ML: HCPCS | Performed by: SURGERY

## 2019-02-15 PROCEDURE — 83735 ASSAY OF MAGNESIUM: CPT

## 2019-02-15 PROCEDURE — 99900035 HC TECH TIME PER 15 MIN (STAT)

## 2019-02-15 PROCEDURE — 25000003 PHARM REV CODE 250: Performed by: STUDENT IN AN ORGANIZED HEALTH CARE EDUCATION/TRAINING PROGRAM

## 2019-02-15 PROCEDURE — 99231 PR SUBSEQUENT HOSPITAL CARE,LEVL I: ICD-10-PCS | Mod: ,,, | Performed by: NURSE PRACTITIONER

## 2019-02-15 PROCEDURE — 85025 COMPLETE CBC W/AUTO DIFF WBC: CPT

## 2019-02-15 PROCEDURE — 25000003 PHARM REV CODE 250: Performed by: PHYSICIAN ASSISTANT

## 2019-02-15 PROCEDURE — 94761 N-INVAS EAR/PLS OXIMETRY MLT: CPT

## 2019-02-15 PROCEDURE — 36415 COLL VENOUS BLD VENIPUNCTURE: CPT

## 2019-02-15 PROCEDURE — 63600175 PHARM REV CODE 636 W HCPCS: Performed by: PHYSICIAN ASSISTANT

## 2019-02-15 PROCEDURE — 80053 COMPREHEN METABOLIC PANEL: CPT

## 2019-02-15 PROCEDURE — 82330 ASSAY OF CALCIUM: CPT

## 2019-02-15 PROCEDURE — 92526 ORAL FUNCTION THERAPY: CPT

## 2019-02-15 PROCEDURE — 84100 ASSAY OF PHOSPHORUS: CPT

## 2019-02-15 RX ORDER — HYDROCODONE BITARTRATE AND ACETAMINOPHEN 7.5; 325 MG/15ML; MG/15ML
15 SOLUTION ORAL EVERY 4 HOURS PRN
Status: DISCONTINUED | OUTPATIENT
Start: 2019-02-15 | End: 2019-02-19

## 2019-02-15 RX ORDER — HYDROCODONE BITARTRATE AND ACETAMINOPHEN 7.5; 325 MG/15ML; MG/15ML
30 SOLUTION ORAL EVERY 4 HOURS PRN
Status: DISCONTINUED | OUTPATIENT
Start: 2019-02-15 | End: 2019-02-19

## 2019-02-15 RX ADMIN — HYDROMORPHONE HYDROCHLORIDE 1 MG: 1 INJECTION, SOLUTION INTRAMUSCULAR; INTRAVENOUS; SUBCUTANEOUS at 02:02

## 2019-02-15 RX ADMIN — ASPIRIN 81 MG CHEWABLE TABLET 81 MG: 81 TABLET CHEWABLE at 09:02

## 2019-02-15 RX ADMIN — MICONAZOLE NITRATE: 20 OINTMENT TOPICAL at 01:02

## 2019-02-15 RX ADMIN — METRONIDAZOLE 500 MG: 500 TABLET ORAL at 01:02

## 2019-02-15 RX ADMIN — FLUTICASONE FUROATE AND VILANTEROL TRIFENATATE 1 PUFF: 100; 25 POWDER RESPIRATORY (INHALATION) at 09:02

## 2019-02-15 RX ADMIN — CIPROFLOXACIN HYDROCHLORIDE 500 MG: 500 TABLET, FILM COATED ORAL at 09:02

## 2019-02-15 RX ADMIN — Medication 10 ML: at 05:02

## 2019-02-15 RX ADMIN — HYDROCODONE BITARTRATE AND ACETAMINOPHEN 30 ML: 7.5; 325 SOLUTION ORAL at 09:02

## 2019-02-15 RX ADMIN — CIPROFLOXACIN HYDROCHLORIDE 500 MG: 500 TABLET, FILM COATED ORAL at 08:02

## 2019-02-15 RX ADMIN — METRONIDAZOLE 500 MG: 500 TABLET ORAL at 08:02

## 2019-02-15 RX ADMIN — HYDROCODONE BITARTRATE AND ACETAMINOPHEN 30 ML: 7.5; 325 SOLUTION ORAL at 10:02

## 2019-02-15 RX ADMIN — PANTOPRAZOLE SODIUM 40 MG: 40 INJECTION, POWDER, LYOPHILIZED, FOR SOLUTION INTRAVENOUS at 09:02

## 2019-02-15 RX ADMIN — DILTIAZEM HYDROCHLORIDE 180 MG: 180 CAPSULE, COATED, EXTENDED RELEASE ORAL at 09:02

## 2019-02-15 RX ADMIN — PANTOPRAZOLE SODIUM 40 MG: 40 INJECTION, POWDER, LYOPHILIZED, FOR SOLUTION INTRAVENOUS at 08:02

## 2019-02-15 RX ADMIN — DULOXETINE 60 MG: 60 CAPSULE, DELAYED RELEASE ORAL at 09:02

## 2019-02-15 RX ADMIN — FUROSEMIDE 40 MG: 40 TABLET ORAL at 09:02

## 2019-02-15 RX ADMIN — Medication 10 ML: at 01:02

## 2019-02-15 RX ADMIN — CLOPIDOGREL 75 MG: 75 TABLET, FILM COATED ORAL at 09:02

## 2019-02-15 RX ADMIN — Medication 10 ML: at 12:02

## 2019-02-15 RX ADMIN — MICONAZOLE NITRATE: 20 OINTMENT TOPICAL at 09:02

## 2019-02-15 NOTE — PROGRESS NOTES
"[facility]  General Surgery  Progress Note    Subjective:     History of Present Illness:  64 yo W with a history of HTN, COPD on home oxygen, HFpEF, IDDMII, CVA on plavix, HTN, debility after fall and broken hip, and PE who presents to the ED with a several month history of nausea, vomiting, inability to tolerate a diet and weight loss. She has had multiple admissions in the past few months for different ailments. She presents today with continued nausea, vomiting and abdominal pain that is mostly worse in the RLQ. During the examination, she states her pain was all over her abdomen because she was retching so much. She states that she has lost close to 40lbs since her surgery and that she only able to keep broth down. She had a lap converted to open appendectomy back in August 2018 that was complicated by a wound infection, C diff and failure to thrive. This seems to persists. She is now wheelchair bound (per her) and apparently lives in [place] but is here in [place] with her daughter.    She had a CT scan in the ED which revealed an irregular shaped rim enhancing fluid collection measuring at least 4.0 x 3.0 cm ight hemipelvis at the site of prior appendectomy. Surgery was consulted for further recommendations. She was also noted to have a "knot" at the RLQ incision site that is also tender.    Post-Op Info:  Procedure(s) (LRB):  LAPAROTOMY, EXPLORATORY (N/A)  INCISION AND DRAINAGE, ABSCESS-  drainage of pelvic abscess (N/A)  EXCISION, ABSCESS- drainage abdominal wall abscess (N/A)  APPLICATION, WOUND VAC (N/A)   21 Days Post-Op     Interval History:  No acute events. Incontinent of urine. Pain well controlled. Tolerating TFs at 30 cc/h.    Medications:  Continuous Infusions:    Scheduled Meds:   aspirin  81 mg Oral Daily    ciprofloxacin HCl  500 mg Oral Q12H    clopidogrel  75 mg Oral Daily    diltiaZEM  180 mg Oral Daily    DULoxetine  60 mg Oral Daily    fluticasone-vilanterol  1 " puff Inhalation Daily    furosemide  40 mg Oral BID    glucagon (human recombinant)  1 mg Intramuscular Once    metroNIDAZOLE  500 mg Oral Q8H    miconazole nitrate 2%   Topical (Top) BID    pantoprozole (PROTONIX) IV  40 mg Intravenous Q12H    sodium chloride 0.9%  10 mL Intravenous Q6H     PRN Meds:albuterol sulfate, albuterol-ipratropium, dextrose 50%, diclofenac sodium, diphenhydrAMINE, glucagon (human recombinant), hydrALAZINE, hydrocodone-apap 7.5-325 MG/15 ML, hydrocodone-apap 7.5-325 MG/15 ML, insulin aspart U-100, ondansetron, promethazine (PHENERGAN) IVPB, Flushing PICC Protocol **AND** sodium chloride 0.9% **AND** sodium chloride 0.9%, sodium chloride 0.9%, sodium chloride 0.9%     Review of patient's allergies indicates:   Allergen Reactions    Ace inhibitors Swelling    Carvedilol      Other reaction(s): Other (See Comments)  blister    Hydralazine analogues     Metformin Nausea And Vomiting    Tetracycline Itching    Tetracyclines Swelling    Travatan (with benzalkonium) [travoprost (benzalkonium)]     Travoprost Itching     Other reaction(s): Other (See Comments)  Blurry vision     Objective:     Vital Signs (Most Recent):  Temp: 97.6 °F (36.4 °C) (02/15/19 0818)  Pulse: 84 (02/15/19 0818)  Resp: 16 (02/15/19 0734)  BP: 133/63 (02/15/19 0818)  SpO2: 100 % (02/15/19 0818) Vital Signs (24h Range):  Temp:  [97.3 °F (36.3 °C)-98.3 °F (36.8 °C)] 97.6 °F (36.4 °C)  Pulse:  [64-92] 84  Resp:  [16-24] 16  SpO2:  [95 %-100 %] 100 %  BP: (107-133)/(55-77) 133/63     Weight: 75.4 kg (166 lb 3.6 oz)  Body mass index is 31.41 kg/m².    Intake/Output - Last 3 Shifts       02/13 0700 - 02/14 0659 02/14 0700 - 02/15 0659 02/15 0700 - 02/16 0659    NG/ 1000     Total Intake(mL/kg) 920 (12.2) 1000 (13.3)     Urine (mL/kg/hr) 0 (0) 1550 (0.9)     Emesis/NG output 400      Total Output 400 1550     Net +520 -550            Urine Occurrence 3 x      Stool Occurrence 1 x            Physical Exam    Constitutional: She appears well-developed and well-nourished. No distress.   HENT:   Head: Normocephalic and atraumatic.   Cardiovascular: Normal rate and regular rhythm.   Pulmonary/Chest: Effort normal. No respiratory distress.   Abdominal:   Soft, appropriate TTP  Open wounds covered with adequate dressing. No signs of infection       Significant Labs:  CBC:   Recent Labs   Lab 02/15/19  0408   WBC 8.80   RBC 3.16*   HGB 9.1*   HCT 29.8*   PLT SEE COMMENT   MCV 94   MCH 28.8   MCHC 30.5*       CMP:   Recent Labs   Lab 02/15/19  0408   GLU 83   CALCIUM 9.8   ALBUMIN 1.4*   PROT 5.4*      K 3.8   CO2 31*      BUN 17   CREATININE 0.6   ALKPHOS 1,536*   ALT 27   AST 84*   BILITOT 4.1*     LFTs:   Recent Labs   Lab 02/15/19  0408   ALT 27   AST 84*   ALKPHOS 1,536*   BILITOT 4.1*   PROT 5.4*   ALBUMIN 1.4*     Assessment/Plan:     * Pelvic abscess in female    -S/p ex-lap with drainage of abscess. On antibiotics.  - Vanc completed.   4 weeks total of cipro flagyl   Will discuss recs with ID     Elevated liver enzymes    AST uptrending  Tbili 4  Alk phos 1500 today  CT showed biliary sludge     Acute blood loss anemia    2 prbc 2/11/19  Responded appropriately     Dysarthria and anarthria    PEG placed 2/11/19     Multiple skin tears    -Wound care following     Depression    -cymbalta     Hypophosphatemia    -Replace as needed     Debility    -PT/OT; history of fall with non displaced oblique right tibia fracture at metaphysis into diaphysis. Was wearing brace.  -Severely debilited       Generalized abdominal pain    64 yo female presenting with abdominal pain, nausea, elevated lactate s/p open drainage pelvic abscess 1/25     -NPO  -tube feeds  -TPN off  - changed pain meds to enteral  - increase TFs to goal 60cc/h  -pain/nasuea meds PRN  -Continue broad spec abx - PO cipro, flagyl - estimated end date 2/26/19  -WOCN following, appreciate help  -DVT/GI prophylaxis  -PT/OT  -stable for discharge -  discharge planning     Severe malnutrition    -TPN off  - re start tube feeds at low rate and titrate up     Essential hypertension    -diltiazem, PO lasix     Gastroesophageal reflux disease without esophagitis    -Continue BID protonix     (HFpEF) heart failure with preserved ejection fraction    Last echo 8/2018 with no WMAs, grade 1DD, EF 60%  -Strict I/O, daily weights  -Continue diuresis     Moderate asthma without complication    -Continue with scheduled duonebs     Elevated troponin    -Cardiology consulted. On ASA/plavix at home     CAD (coronary artery disease)    -ASA, plavix     Type 2 diabetes mellitus    -SSI, appreciate endocrine assistance          Cassie Choudhary MD  General Surgery  [facility]

## 2019-02-15 NOTE — NURSING
Glucerna 1.5 running to PEG tube @ 30cc/hr. Residual checked: 40 cc residual present. Will give medication with 60 cc water, discard residual and continue TF @ 30cc/hr. Not advisable to increase TF at this time.

## 2019-02-15 NOTE — ASSESSMENT & PLAN NOTE
BG goal 140 - 180       BG monitoring every 4 hours and low  dose correction scale.     Discharge planning: TBD

## 2019-02-15 NOTE — PT/OT/SLP PROGRESS
"Physical Therapy Treatment    Patient Name:  Sheryl Martines   MRN:  [id number]    Recommendations:     Discharge Recommendations:  nursing facility, skilled   Discharge Equipment Recommendations: none   Barriers to discharge: Decreased caregiver support requires assist for mobility at this time    Assessment:     Sheryl Martines is a 63 y.o. female admitted with a medical diagnosis of Pelvic abscess in female.  She presents with the following impairments/functional limitations:  weakness, impaired endurance, impaired balance, impaired functional mobilty, pain, decreased lower extremity function . Pt is limited with functional mobility due to SOB and weakness.    Rehab Prognosis: Good; patient would benefit from acute skilled PT services to address these deficits and reach maximum level of function.    Recent Surgery: Procedure(s) (LRB):  LAPAROTOMY, EXPLORATORY (N/A)  INCISION AND DRAINAGE, ABSCESS-  drainage of pelvic abscess (N/A)  EXCISION, ABSCESS- drainage abdominal wall abscess (N/A)  APPLICATION, WOUND VAC (N/A) 21 Days Post-Op    Plan:     During this hospitalization, patient to be seen 3 x/week to address the identified rehab impairments via therapeutic activities, therapeutic exercises, neuromuscular re-education, wheelchair management/training and progress toward the following goals:    · Plan of Care Expires:  02/24/19    Subjective   "My legs and my butt hurt"    Pain/Comfort:  · Pain Rating 1: 8/10  · Location - Side 1: Bilateral  · Location 1: leg  · Pain Addressed 1: Reposition, Cessation of Activity  · Pain Rating Post-Intervention 1: (no pain at rest)      Objective:     Communicated with nurse prior to session.  Patient found all lines intact, call button in reach and nurse notified PEG Tube, oxygen, PICC line, pressure relief boots  upon PT entry to room.     General Precautions: Standard, aspiration, fall, contact   Orthopedic Precautions:N/A   Braces: N/A     Functional Mobility:  · Bed " Mobility:     · Rolling Right: maximal assistance  · Supine to Sit: maximal assistance  · Sit to Supine: total assistance  · Transfers:     · Sit to Stand:  total assistance with hand-held assist; pt stood with HHA for ~ 30 sec with max assist with manual cues at R knee into ext.    AM-PAC 6 CLICK MOBILITY  Turning over in bed (including adjusting bedclothes, sheets and blankets)?: 2  Sitting down on and standing up from a chair with arms (e.g., wheelchair, bedside commode, etc.): 2  Moving from lying on back to sitting on the side of the bed?: 2  Moving to and from a bed to a chair (including a wheelchair)?: 1  Need to walk in hospital room?: 1  Climbing 3-5 steps with a railing?: 1  Basic Mobility Total Score: 9     Therapeutic Activities and Exercises:   Pt sat on the EOB ~ 12 min with CGA with pt leaning to the R at times due to c/o her buttocks hurting. Pt performed B LE exs in sitting x 10 reps: knee ext and ankle pumps.    Patient left HOB elevated with all lines intact, call button in reach, nurse notified and nurse and AVS camera present..    GOALS:   Multidisciplinary Problems     Physical Therapy Goals        Problem: Physical Therapy Goal    Goal Priority Disciplines Outcome Goal Variances Interventions   Physical Therapy Goal     PT, PT/OT Ongoing (interventions implemented as appropriate)     Description:  Goals to be met by: 19    Patient will increase functional independence with mobility by performin. Supine to sit with Moderate Assistance -not met  2. Sit to stand transfer with Maximum Assistance -not met  3. Bed to chair transfer with Maximum Assistance -not met  4. Sitting at edge of bed x10 minutes with Contact Guard Assistance - met  5. Lower extremity exercise program x10 reps, with assistance as needed - met 2/4                         Time Tracking:     PT Received On: 02/15/19  PT Start Time: 1315     PT Stop Time: 1344  PT Total Time (min): 29 min     Billable Minutes:  Therapeutic Activity 15 and Therapeutic Exercise 14    Treatment Type: Treatment  PT/PTA: PT     PTA Visit Number: 0     Clarissa Salvador, PT  02/15/2019

## 2019-02-15 NOTE — PT/OT/SLP PROGRESS
"Speech Language Pathology Treatment    Patient Name:  Sheryl Martines   MRN:  [id number]  Admitting Diagnosis: Pelvic abscess in female    Recommendations:                 General Recommendations:  Dysphagia therapy  Diet recommendations:  NPO, Liquid Diet Level: NPO   Aspiration Precautions: Continue alternate means of nutrition, Frequent oral care, Ice chips sparingly and Strict aspiration precautions   General Precautions: Standard, aspiration, NPO, contact  Communication strategies:  go to room if call light pushed    Subjective     Pt presents lethargic  She explains, "It is going ok" when asked about day  No family present in room    Pain/Comfort:  · Pain Rating 1: 0/10    Objective:     Has the patient been evaluated by SLP for swallowing?   Yes  Keep patient NPO? Yes   Current Respiratory Status: nasal cannula      Pt seen for dysphagia therapy. Pt found asleep in bed with nasal canula. HOB elevated to 45 degree angle. CNA in room to take vitals during session. Oral care provided via set-up. Pt denied pain with palatine kanu this service day.  Pt tolerated oral care without complication. She was educated on dysphagia exercises this service day and ongoing SLP POC. Patient verbalized understanding of dysphagia exercises and provided return-demonstration of effortful swallow x4, falsettox5 and WADAx3 across 5 attempts each with moderate verbal and visual cues from SLP.  Patient encourage to continue reps of swallow exercises x5 each at least 3 xdaily over the weekend. She verbalized understanding.  No questions noted. No family present in room. Whiteboard current.      Assessment:     Sheryl Martines is a 63 y.o. female with an SLP diagnosis of Dysphagia.  Current goals remain appropriate. Patient participated well with therapy provided moderate verbal and visual cues today.     Goals:   Multidisciplinary Problems     SLP Goals        Problem: SLP Goal    Goal Priority Disciplines Outcome   SLP Goal     SLP " Ongoing (interventions implemented as appropriate)   Description:  Speech Language Pathology Goals  Goals expected to be met by 2/13/2019- extended to 2/20/2019  1. Pt will participate in further, instrumental assessment via MBSS   2. Educate Pt and family on S/S aspiration and aspiration precautions   3. Pt will complete dysphagia exercises x10 ea with good effort 90% of attempts to improve BOT coordination and pharyngeal strength/coordination.     Goals expected to be met by 2/6/19  1. Pt will participate in ongoing swallow assessment  2. Educate Pt and family on S/S aspiration and aspiration precautions                            Plan:     · Patient to be seen:  3 x/week   · Plan of Care expires:  03/01/19  · Plan of Care reviewed with:  patient   · SLP Follow-Up:  Yes       Discharge recommendations:  nursing facility, skilled     Time Tracking:     SLP Treatment Date:   02/15/19  Speech Start Time:  1650  Speech Stop Time:  1702     Speech Total Time (min):  12 min    Billable Minutes: Treatment Swallowing Dysfunction 10    RAJWINDER Cobb, [facility]-SLP  Speech-Language Pathology  Pager: [phone]      02/15/2019

## 2019-02-15 NOTE — PLAN OF CARE
ION spoke to Cathy with [facility] about Pt. Cathy said that Pt had a dose of IV Dilaudid today and is still max assist with therapy. Cathy said she would re-evaluate on Monday to see if Pt improved with PT/OT. ION asked for some specifics regarding expectations for Pt's therapy progress, as she was wheelchair dependent prior to this admission. Pt does not have NH SNF benefits and can only go to a hospital based SNF so the family is still wanting to wait for a final determination from [facility].     Erma Godinez, MAURILIO        02/15/19 1003   Post-Acute Status   Post-Acute Authorization Placement   Post-Acute Placement Status Pending Medical Review

## 2019-02-15 NOTE — PROGRESS NOTES
[facility]  Endocrinology  Progress Note    Admit Date: 1/23/2019     Reason for Consult: Management of T2DM, Hyperglycemia     Surgical Procedure and Date:      LAPAROTOMY, EXPLORATORY (N/A)     INCISION AND DRAINAGE, ABSCESS-  drainage of pelvic abscess (N/A)     EXCISION, ABSCESS- drainage abdominal wall abscess (N/A)     APPLICATION, WOUND VAC (N/A)       Diabetes diagnosis year: 20 years ago    Home Diabetes Medications:  Reports being taken off of insulin prior to admission.      Levemir 20 units BID     Humalog 16 units TIDWM with correction     How often checking glucose at home? >4 x day   BG readings on regimen: 110's  Hypoglycemia on the regimen?  Yes - 40's  Missed doses on regimen?  No    Diabetes Complications include:     Hypoglycemia     Complicating diabetes co morbidities:   HLD, CVA, and CAD    Lab Results   Component Value Date    HGBA1C 4.7 01/24/2019       HPI:   Patient is a 63 y.o. female with a diagnosis of sepsis and lactic acidosis secondary to abdominal abscess. Also has diagnosis of COPD, asthma, CAD, CVA (2012), and DM2. Patient receives primary care for DM in [place]. Her primary care giver is her daughter. Patient's DM is well controlled by primary Endocrinologist in [place]. Patient has not been on insulin regimen for the past 3 weeks leading up to hospitalization. According to daughter, patient was not eating, and having low BG reading. Therefore, insulin regimen was stopped by patient's primary Endocrinologist. [place] [facility] [place] manage DM/hyperglycemia.             Interval HPI:   Overnight events:  Remains in GISSU. NAEON. TF restarted.  Antibiotics. BG below goal without insulin.   Eating:   NPO  Nausea: Yes  Hypoglycemia and intervention: No  Fever: No  TF: glucerna to goal of 45 cc/hr - currently at 30 cc/hr      BP (P) 125/77 (BP Location: Right arm, Patient Position: Lying)   Pulse 83   Temp (P) 98 °F (36.7 °C) (Oral)   Resp 16   Ht 5'  "1" (1.549 m)   Wt 75.4 kg (166 lb 3.6 oz)   LMP  (LMP Unknown)   SpO2 98%   Breastfeeding? No   BMI 31.41 kg/m²      Labs Reviewed and Include    Recent Labs   Lab 02/15/19  0408   GLU 83   CALCIUM 9.8   ALBUMIN 1.4*   PROT 5.4*      K 3.8   CO2 31*      BUN 17   CREATININE 0.6   ALKPHOS 1,536*   ALT 27   AST 84*   BILITOT 4.1*     Lab Results   Component Value Date    WBC 9.91 02/14/2019    HGB 9.1 (L) 02/15/2019    HCT 29.8 (L) 02/15/2019    MCV 94 02/15/2019    PLT SEE COMMENT 02/14/2019     No results for input(s): TSH, FREET4 in the last 168 hours.  Lab Results   Component Value Date    HGBA1C 4.7 01/24/2019       Nutritional status:   Body mass index is 31.41 kg/m².  Lab Results   Component Value Date    ALBUMIN 1.4 (L) 02/15/2019    ALBUMIN 1.4 (L) 02/14/2019    ALBUMIN 1.4 (L) 02/13/2019     Lab Results   Component Value Date    PREALBUMIN 5 (L) 02/14/2019    PREALBUMIN 6 (L) 02/07/2019    PREALBUMIN 6 (L) 02/07/2019       Estimated Creatinine Clearance: 89.1 mL/min (based on SCr of 0.6 mg/dL).    Accu-Checks  Recent Labs     02/12/19  2307 02/13/19  0438 02/13/19  0838 02/13/19  1210 02/13/19  1548 02/14/19  0023 02/14/19  0523 02/14/19  1137 02/14/19  1817 02/15/19  0131   POCTGLUCOSE 194* 155* 105 120* 109 88 85 85 76 81       Current Medications and/or Treatments Impacting Glycemic Control  Immunotherapy:    Immunosuppressants     None        Steroids:   Hormones (From admission, onward)    None        Pressors:    Autonomic Drugs (From admission, onward)    None        Hyperglycemia/Diabetes Medications:   Antihyperglycemics (From admission, onward)    Start     Stop Route Frequency Ordered    02/03/19 1016  insulin aspart U-100 pen 1-10 Units      -- SubQ Every 4 hours PRN 02/03/19 0916          ASSESSMENT and PLAN    * Pelvic abscess in female    Managed per primary team  Avoid hypoglycemia  Optimize BG control to improve wound healing         Type 2 diabetes mellitus    BG goal 140 - " 180       BG monitoring every 4 hours and moderate dose correction scale.     Discharge planning: TBD         CAD (coronary artery disease)    Managed per primary team  Condition may cause insulin resistance          On total parenteral nutrition (TPN)-resolved as of 2/15/2019    May elevate BG   Weaning rate per primary.        On enteral nutrition    Trickle feeds at 10 cc/hr to advance to 45 cc/hr   May elevate BG.        Overweight (BMI 25.0-29.9)    Body mass index is 31.41 kg/m².  may contribute to insulin resistance             Marta Sandoval NP  Endocrinology  [facility]

## 2019-02-15 NOTE — SUBJECTIVE & OBJECTIVE
Interval History:  No acute events. Incontinent of urine. Pain well controlled. Tolerating TFs at 30 cc/h.    Medications:  Continuous Infusions:    Scheduled Meds:   aspirin  81 mg Oral Daily    ciprofloxacin HCl  500 mg Oral Q12H    clopidogrel  75 mg Oral Daily    diltiaZEM  180 mg Oral Daily    DULoxetine  60 mg Oral Daily    fluticasone-vilanterol  1 puff Inhalation Daily    furosemide  40 mg Oral BID    glucagon (human recombinant)  1 mg Intramuscular Once    metroNIDAZOLE  500 mg Oral Q8H    miconazole nitrate 2%   Topical (Top) BID    pantoprozole (PROTONIX) IV  40 mg Intravenous Q12H    sodium chloride 0.9%  10 mL Intravenous Q6H     PRN Meds:albuterol sulfate, albuterol-ipratropium, dextrose 50%, diclofenac sodium, diphenhydrAMINE, glucagon (human recombinant), hydrALAZINE, hydrocodone-apap 7.5-325 MG/15 ML, hydrocodone-apap 7.5-325 MG/15 ML, insulin aspart U-100, ondansetron, promethazine (PHENERGAN) IVPB, Flushing PICC Protocol **AND** sodium chloride 0.9% **AND** sodium chloride 0.9%, sodium chloride 0.9%, sodium chloride 0.9%     Review of patient's allergies indicates:   Allergen Reactions    Ace inhibitors Swelling    Carvedilol      Other reaction(s): Other (See Comments)  blister    Hydralazine analogues     Metformin Nausea And Vomiting    Tetracycline Itching    Tetracyclines Swelling    Travatan (with benzalkonium) [travoprost (benzalkonium)]     Travoprost Itching     Other reaction(s): Other (See Comments)  Blurry vision     Objective:     Vital Signs (Most Recent):  Temp: 97.6 °F (36.4 °C) (02/15/19 0818)  Pulse: 84 (02/15/19 0818)  Resp: 16 (02/15/19 0734)  BP: 133/63 (02/15/19 0818)  SpO2: 100 % (02/15/19 0818) Vital Signs (24h Range):  Temp:  [97.3 °F (36.3 °C)-98.3 °F (36.8 °C)] 97.6 °F (36.4 °C)  Pulse:  [64-92] 84  Resp:  [16-24] 16  SpO2:  [95 %-100 %] 100 %  BP: (107-133)/(55-77) 133/63     Weight: 75.4 kg (166 lb 3.6 oz)  Body mass index is 31.41  kg/m².    Intake/Output - Last 3 Shifts       02/13 0700 - 02/14 0659 02/14 0700 - 02/15 0659 02/15 0700 - 02/16 0659    NG/ 1000     Total Intake(mL/kg) 920 (12.2) 1000 (13.3)     Urine (mL/kg/hr) 0 (0) 1550 (0.9)     Emesis/NG output 400      Total Output 400 1550     Net +520 -550            Urine Occurrence 3 x      Stool Occurrence 1 x            Physical Exam   Constitutional: She appears well-developed and well-nourished. No distress.   HENT:   Head: Normocephalic and atraumatic.   Cardiovascular: Normal rate and regular rhythm.   Pulmonary/Chest: Effort normal. No respiratory distress.   Abdominal:   Soft, appropriate TTP  Open wounds covered with adequate dressing. No signs of infection       Significant Labs:  CBC:   Recent Labs   Lab 02/15/19  0408   WBC 8.80   RBC 3.16*   HGB 9.1*   HCT 29.8*   PLT SEE COMMENT   MCV 94   MCH 28.8   MCHC 30.5*       CMP:   Recent Labs   Lab 02/15/19  0408   GLU 83   CALCIUM 9.8   ALBUMIN 1.4*   PROT 5.4*      K 3.8   CO2 31*      BUN 17   CREATININE 0.6   ALKPHOS 1,536*   ALT 27   AST 84*   BILITOT 4.1*     LFTs:   Recent Labs   Lab 02/15/19  0408   ALT 27   AST 84*   ALKPHOS 1,536*   BILITOT 4.1*   PROT 5.4*   ALBUMIN 1.4*

## 2019-02-15 NOTE — NURSING
Pt has refused Lasix for second time today. Large, incontinent urine X2 in bed; linen changed, skin cleaned. Will inform MD of refusal and incontinence. WCMERLIN.

## 2019-02-15 NOTE — PLAN OF CARE
Problem: SLP Goal  Goal: SLP Goal  Speech Language Pathology Goals  Goals expected to be met by 2/13/2019- extended to 2/20/2019  1. Pt will participate in further, instrumental assessment via MBSS   2. Educate Pt and family on S/S aspiration and aspiration precautions   3. Pt will complete dysphagia exercises x10 ea with good effort 90% of attempts to improve BOT coordination and pharyngeal strength/coordination.     Goals expected to be met by 2/6/19  1. Pt will participate in ongoing swallow assessment  2. Educate Pt and family on S/S aspiration and aspiration precautions           Outcome: Ongoing (interventions implemented as appropriate)  Pt seen for dysphagia therapy. Pt completed dysphagia exercises with mod A. Current goals remain ongoing. Oral care provided. ST to continue to follow.  See note for full details.     NAVID Cobb., [facility]-SLP  Speech-Language Pathology  Pager: [phone]  2/15/2019

## 2019-02-15 NOTE — PLAN OF CARE
Problem: Physical Therapy Goal  Goal: Physical Therapy Goal  Goals to be met by: 19    Patient will increase functional independence with mobility by performin. Supine to sit with Moderate Assistance -not met  2. Sit to stand transfer with Maximum Assistance -not met  3. Bed to chair transfer with Maximum Assistance -not met  4. Sitting at edge of bed x10 minutes with Contact Guard Assistance - met  5. Lower extremity exercise program x10 reps, with assistance as needed - met        Outcome: Ongoing (interventions implemented as appropriate)  Pt's goals remain appropriate and pt will continue to benefit from skilled PT services to work towards improved functional mobility including: bed mobility, transfers, and gait.   Clarissa Salvador, PT  2/15/2019

## 2019-02-15 NOTE — ASSESSMENT & PLAN NOTE
64 yo female presenting with abdominal pain, nausea, elevated lactate s/p open drainage pelvic abscess 1/25     -NPO  -tube feeds  -TPN off  - changed pain meds to enteral  - increase TFs to goal 60cc/h  -pain/nasuea meds PRN  -Continue broad spec abx - PO cipro, flagyl - estimated end date 2/26/19  -WOCN following, appreciate help  -DVT/GI prophylaxis  -PT/OT  -stable for discharge - discharge planning

## 2019-02-16 LAB
ALBUMIN SERPL BCP-MCNC: 1.4 G/DL
ALP SERPL-CCNC: 1664 U/L
ALT SERPL W/O P-5'-P-CCNC: 30 U/L
ANION GAP SERPL CALC-SCNC: 4 MMOL/L
AST SERPL-CCNC: 113 U/L
BILIRUB SERPL-MCNC: 3.7 MG/DL
BUN SERPL-MCNC: 12 MG/DL
CA-I BLDV-SCNC: 1.29 MMOL/L
CALCIUM SERPL-MCNC: 9.6 MG/DL
CHLORIDE SERPL-SCNC: 106 MMOL/L
CO2 SERPL-SCNC: 34 MMOL/L
CREAT SERPL-MCNC: 0.6 MG/DL
EST. GFR  (AFRICAN AMERICAN): >60 ML/MIN/1.73 M^2
EST. GFR  (NON AFRICAN AMERICAN): >60 ML/MIN/1.73 M^2
GLUCOSE SERPL-MCNC: 116 MG/DL
MAGNESIUM SERPL-MCNC: 1.1 MG/DL
PHOSPHATE SERPL-MCNC: 3.9 MG/DL
POCT GLUCOSE: 127 MG/DL (ref 70–110)
POCT GLUCOSE: 131 MG/DL (ref 70–110)
POCT GLUCOSE: 132 MG/DL (ref 70–110)
POCT GLUCOSE: 136 MG/DL (ref 70–110)
POCT GLUCOSE: 158 MG/DL (ref 70–110)
POTASSIUM SERPL-SCNC: 3.8 MMOL/L
PROT SERPL-MCNC: 5.4 G/DL
SODIUM SERPL-SCNC: 144 MMOL/L

## 2019-02-16 PROCEDURE — 82330 ASSAY OF CALCIUM: CPT

## 2019-02-16 PROCEDURE — 25000003 PHARM REV CODE 250: Performed by: SURGERY

## 2019-02-16 PROCEDURE — A4216 STERILE WATER/SALINE, 10 ML: HCPCS | Performed by: SURGERY

## 2019-02-16 PROCEDURE — 99900035 HC TECH TIME PER 15 MIN (STAT)

## 2019-02-16 PROCEDURE — 25000003 PHARM REV CODE 250: Performed by: STUDENT IN AN ORGANIZED HEALTH CARE EDUCATION/TRAINING PROGRAM

## 2019-02-16 PROCEDURE — 25000003 PHARM REV CODE 250: Performed by: PHYSICIAN ASSISTANT

## 2019-02-16 PROCEDURE — 94761 N-INVAS EAR/PLS OXIMETRY MLT: CPT

## 2019-02-16 PROCEDURE — 25000242 PHARM REV CODE 250 ALT 637 W/ HCPCS: Performed by: STUDENT IN AN ORGANIZED HEALTH CARE EDUCATION/TRAINING PROGRAM

## 2019-02-16 PROCEDURE — 83735 ASSAY OF MAGNESIUM: CPT

## 2019-02-16 PROCEDURE — 27000221 HC OXYGEN, UP TO 24 HOURS

## 2019-02-16 PROCEDURE — 80053 COMPREHEN METABOLIC PANEL: CPT

## 2019-02-16 PROCEDURE — 20600001 HC STEP DOWN PRIVATE ROOM

## 2019-02-16 PROCEDURE — 63600175 PHARM REV CODE 636 W HCPCS: Performed by: STUDENT IN AN ORGANIZED HEALTH CARE EDUCATION/TRAINING PROGRAM

## 2019-02-16 PROCEDURE — 84100 ASSAY OF PHOSPHORUS: CPT

## 2019-02-16 PROCEDURE — 94664 DEMO&/EVAL PT USE INHALER: CPT

## 2019-02-16 PROCEDURE — 63600175 PHARM REV CODE 636 W HCPCS: Performed by: PHYSICIAN ASSISTANT

## 2019-02-16 PROCEDURE — C9113 INJ PANTOPRAZOLE SODIUM, VIA: HCPCS | Performed by: PHYSICIAN ASSISTANT

## 2019-02-16 RX ORDER — POTASSIUM CHLORIDE 20 MEQ/15ML
40 SOLUTION ORAL ONCE
Status: COMPLETED | OUTPATIENT
Start: 2019-02-16 | End: 2019-02-16

## 2019-02-16 RX ORDER — AMOXICILLIN 250 MG
1 CAPSULE ORAL 2 TIMES DAILY
Status: DISCONTINUED | OUTPATIENT
Start: 2019-02-16 | End: 2019-02-27

## 2019-02-16 RX ORDER — MAGNESIUM SULFATE HEPTAHYDRATE 40 MG/ML
2 INJECTION, SOLUTION INTRAVENOUS
Status: COMPLETED | OUTPATIENT
Start: 2019-02-16 | End: 2019-02-16

## 2019-02-16 RX ADMIN — CIPROFLOXACIN HYDROCHLORIDE 500 MG: 500 TABLET, FILM COATED ORAL at 10:02

## 2019-02-16 RX ADMIN — DILTIAZEM HYDROCHLORIDE 180 MG: 180 CAPSULE, COATED, EXTENDED RELEASE ORAL at 10:02

## 2019-02-16 RX ADMIN — HYDROCODONE BITARTRATE AND ACETAMINOPHEN 30 ML: 7.5; 325 SOLUTION ORAL at 10:02

## 2019-02-16 RX ADMIN — Medication 10 ML: at 12:02

## 2019-02-16 RX ADMIN — POTASSIUM CHLORIDE 40 MEQ: 20 SOLUTION ORAL at 10:02

## 2019-02-16 RX ADMIN — MICONAZOLE NITRATE: 20 OINTMENT TOPICAL at 10:02

## 2019-02-16 RX ADMIN — HYDROCODONE BITARTRATE AND ACETAMINOPHEN 30 ML: 7.5; 325 SOLUTION ORAL at 01:02

## 2019-02-16 RX ADMIN — DULOXETINE 60 MG: 60 CAPSULE, DELAYED RELEASE ORAL at 10:02

## 2019-02-16 RX ADMIN — MAGNESIUM SULFATE IN WATER 2 G: 40 INJECTION, SOLUTION INTRAVENOUS at 11:02

## 2019-02-16 RX ADMIN — FUROSEMIDE 40 MG: 40 TABLET ORAL at 10:02

## 2019-02-16 RX ADMIN — HYDROCODONE BITARTRATE AND ACETAMINOPHEN 15 ML: 7.5; 325 SOLUTION ORAL at 10:02

## 2019-02-16 RX ADMIN — CLOPIDOGREL 75 MG: 75 TABLET, FILM COATED ORAL at 11:02

## 2019-02-16 RX ADMIN — FLUTICASONE FUROATE AND VILANTEROL TRIFENATATE 1 PUFF: 100; 25 POWDER RESPIRATORY (INHALATION) at 10:02

## 2019-02-16 RX ADMIN — STANDARDIZED SENNA CONCENTRATE AND DOCUSATE SODIUM 1 TABLET: 8.6; 5 TABLET, FILM COATED ORAL at 11:02

## 2019-02-16 RX ADMIN — MAGNESIUM SULFATE IN WATER 2 G: 40 INJECTION, SOLUTION INTRAVENOUS at 10:02

## 2019-02-16 RX ADMIN — Medication 10 ML: at 05:02

## 2019-02-16 RX ADMIN — STANDARDIZED SENNA CONCENTRATE AND DOCUSATE SODIUM 1 TABLET: 8.6; 5 TABLET, FILM COATED ORAL at 10:02

## 2019-02-16 RX ADMIN — INSULIN ASPART 1 UNITS: 100 INJECTION, SOLUTION INTRAVENOUS; SUBCUTANEOUS at 10:02

## 2019-02-16 RX ADMIN — METRONIDAZOLE 500 MG: 500 TABLET ORAL at 01:02

## 2019-02-16 RX ADMIN — METRONIDAZOLE 500 MG: 500 TABLET ORAL at 10:02

## 2019-02-16 RX ADMIN — PANTOPRAZOLE SODIUM 40 MG: 40 INJECTION, POWDER, LYOPHILIZED, FOR SOLUTION INTRAVENOUS at 10:02

## 2019-02-16 RX ADMIN — Medication 10 ML: at 01:02

## 2019-02-16 RX ADMIN — ASPIRIN 81 MG CHEWABLE TABLET 81 MG: 81 TABLET CHEWABLE at 10:02

## 2019-02-16 RX ADMIN — METRONIDAZOLE 500 MG: 500 TABLET ORAL at 05:02

## 2019-02-16 RX ADMIN — FUROSEMIDE 40 MG: 40 TABLET ORAL at 05:02

## 2019-02-16 NOTE — NURSING
Paged Dr. Bush covering for Dr. Laurent. Informed him pt refused Lasix twice today and has been incontinent of urine twice on this shift, urine unable to measure. MD aware.

## 2019-02-16 NOTE — ASSESSMENT & PLAN NOTE
64 yo female presenting with abdominal pain, nausea, elevated lactate s/p open drainage pelvic abscess 1/25     -NPO  -Continue tube feeds  -Changed pain meds to enteral  -Increase TFs to goal 45cc/hr  -Pain/nasuea meds PRN  -Continue broad spec abx - PO cipro, flagyl - estimated end date 2/26/19  -WOCN following, appreciate help  -DVT/GI prophylaxis  -PT/OT  -Stable for discharge - discharge planning

## 2019-02-16 NOTE — NURSING
Pt has reported increase in pain today. Pain located in open, bleeding wounds. Turning pt Q 2h to keep wounds out of incontinent urine and fecal matter. Applying prescribed cream. Legs, shoulders and back very sensitive and sore when turned. Joint pain noted. Waiting for decrease in residual amount from PEG tube to increase TF to MD ordered rate of 45 cc/hr. Will collect CBC.

## 2019-02-16 NOTE — PLAN OF CARE
Problem: Wound  Goal: Optimal Wound Healing    Intervention: Promote Effective Wound Healing  Pt was incontinent twice; large amount of urine requiring full bed change. Seen by wound care, her excessive wounds are open, painful, bleeding and moist. Wound care instructed frequent incontinence care, careful skin cleansing and application of antifungal cream to all affected areas. Contact precautions maintained and all equipment disinfected after use. POCT checked, VSS. No s/sx of infection. Specialty bed in use. Heels offloaded with green boots; pillows supporting arms. All tubing free from contact. TF remains at 30 cc/hr d/t large residual. Call light within reach. Prakash in hand for frequent, productive cough. The Glassbox[employer] camera in place. 3 side rails raised. Central line flushed; dressing c/d/i. WCTM.

## 2019-02-16 NOTE — ASSESSMENT & PLAN NOTE
-S/p ex-lap with drainage of abscess. On antibiotics.  -Vanc completed.   -On antibiotics; date of completion estimated 2/26/19

## 2019-02-16 NOTE — PLAN OF CARE
Problem: Skin Injury Risk Increased  Goal: Skin Health and Integrity  Outcome: Ongoing (interventions implemented as appropriate)  Patient  Presently have very poor skin integrity, Patient has open area on trunk and sacral area. Patient has skin irritation to skin folds as well Patient remians incontinent of urine, very heavy voiding at this time. Patient continues to have severe pain generalized pain over most of her body. Patient remains on tube feeding at 30 ml an hour. Call light in reach, suction in reach. Will continue to observe as needed.

## 2019-02-16 NOTE — SUBJECTIVE & OBJECTIVE
Interval History: Refused dose of lasix overnight. Tube feeds remain at 30cc/hr. No IV narcotics given overnight.    Medications:  Continuous Infusions:  Scheduled Meds:   aspirin  81 mg Oral Daily    ciprofloxacin HCl  500 mg Oral Q12H    clopidogrel  75 mg Oral Daily    diltiaZEM  180 mg Oral Daily    DULoxetine  60 mg Oral Daily    fluticasone-vilanterol  1 puff Inhalation Daily    furosemide  40 mg Oral BID    glucagon (human recombinant)  1 mg Intramuscular Once    metroNIDAZOLE  500 mg Oral Q8H    miconazole nitrate 2%   Topical (Top) BID    pantoprozole (PROTONIX) IV  40 mg Intravenous Q12H    senna-docusate 8.6-50 mg  1 tablet Per G Tube BID    sodium chloride 0.9%  10 mL Intravenous Q6H     PRN Meds:albuterol sulfate, albuterol-ipratropium, dextrose 50%, diclofenac sodium, diphenhydrAMINE, glucagon (human recombinant), hydrALAZINE, hydrocodone-apap 7.5-325 MG/15 ML, hydrocodone-apap 7.5-325 MG/15 ML, insulin aspart U-100, ondansetron, promethazine (PHENERGAN) IVPB, Flushing PICC Protocol **AND** sodium chloride 0.9% **AND** sodium chloride 0.9%, sodium chloride 0.9%, sodium chloride 0.9%     Review of patient's allergies indicates:   Allergen Reactions    Ace inhibitors Swelling    Carvedilol      Other reaction(s): Other (See Comments)  blister    Hydralazine analogues     Metformin Nausea And Vomiting    Tetracycline Itching    Tetracyclines Swelling    Travatan (with benzalkonium) [travoprost (benzalkonium)]     Travoprost Itching     Other reaction(s): Other (See Comments)  Blurry vision     Objective:     Vital Signs (Most Recent):  Temp: 98.2 °F (36.8 °C) (02/16/19 1252)  Pulse: 91 (02/16/19 1252)  Resp: (!) 22 (02/16/19 1252)  BP: (!) 107/54 (02/16/19 1252)  SpO2: 95 % (02/16/19 1252) Vital Signs (24h Range):  Temp:  [98.2 °F (36.8 °C)-99.1 °F (37.3 °C)] 98.2 °F (36.8 °C)  Pulse:  [85-95] 91  Resp:  [18-22] 22  SpO2:  [90 %-100 %] 95 %  BP: ()/(36-66) 107/54     Weight:  75.4 kg (166 lb 3.6 oz)  Body mass index is 31.41 kg/m².    Intake/Output - Last 3 Shifts       02/14 0700 - 02/15 0659 02/15 0700 - 02/16 0659 02/16 0700 - 02/17 0659    NG/GT 1000 1049     Total Intake(mL/kg) 1000 (13.3) 1049 (13.9)     Urine (mL/kg/hr) 1550 (0.9) 100 (0.1)     Emesis/NG output       Drains  40     Stool  0     Total Output 1550 140     Net -550 +909            Urine Occurrence  3 x     Stool Occurrence  0 x           Physical Exam   Constitutional: She appears well-developed.   Neck: Normal range of motion. Neck supple.   Cardiovascular: Normal rate and regular rhythm.   Pulmonary/Chest: Effort normal and breath sounds normal.   Abdominal: Soft. She exhibits no distension. There is no tenderness.       Musculoskeletal: She exhibits no edema.   Neurological: She is alert.   Skin: Skin is warm and dry.   Breakdown noted on backside and anterior abdominal wall       Significant Labs:  CBC:   Recent Labs   Lab 02/15/19  0408   WBC 8.80   RBC 3.16*   HGB 9.1*   HCT 29.8*   PLT SEE COMMENT   MCV 94   MCH 28.8   MCHC 30.5*     CMP:   Recent Labs   Lab 02/16/19  0356   *   CALCIUM 9.6   ALBUMIN 1.4*   PROT 5.4*      K 3.8   CO2 34*      BUN 12   CREATININE 0.6   ALKPHOS 1,664*   ALT 30   *   BILITOT 3.7*       Significant Diagnostics:  None

## 2019-02-16 NOTE — CARE UPDATE
BG goal 140-180. BG remains below goal without insulin. TF at 30 cc/hr (goal 45 cc/hr).         Continue BG monitoring every 4 hours and moderate dose correction scale.         ** Please call Endocrine for any BG related issues **

## 2019-02-17 PROBLEM — N39.42 URINARY INCONTINENCE WITHOUT SENSORY AWARENESS: Status: ACTIVE | Noted: 2019-02-17

## 2019-02-17 LAB
ACANTHOCYTES BLD QL SMEAR: ABNORMAL
ACANTHOCYTES BLD QL SMEAR: ABNORMAL
ALBUMIN SERPL BCP-MCNC: 1.5 G/DL
ALP SERPL-CCNC: 1732 U/L
ALT SERPL W/O P-5'-P-CCNC: 34 U/L
ANION GAP SERPL CALC-SCNC: 7 MMOL/L
ANISOCYTOSIS BLD QL SMEAR: ABNORMAL
ANISOCYTOSIS BLD QL SMEAR: ABNORMAL
AST SERPL-CCNC: 121 U/L
AUER BODIES BLD QL SMEAR: ABNORMAL
AUER BODIES BLD QL SMEAR: ABNORMAL
BASO STIPL BLD QL SMEAR: ABNORMAL
BASO STIPL BLD QL SMEAR: ABNORMAL
BASOPHILS # BLD AUTO: 0.08 K/UL
BASOPHILS # BLD AUTO: ABNORMAL K/UL
BASOPHILS # BLD AUTO: ABNORMAL K/UL
BASOPHILS NFR BLD: 0.7 %
BASOPHILS NFR BLD: ABNORMAL %
BASOPHILS NFR BLD: ABNORMAL %
BILIRUB SERPL-MCNC: 3.3 MG/DL
BLASTS NFR BLD MANUAL: ABNORMAL %
BLASTS NFR BLD MANUAL: ABNORMAL %
BUN SERPL-MCNC: 10 MG/DL
BURR CELLS BLD QL SMEAR: ABNORMAL
BURR CELLS BLD QL SMEAR: ABNORMAL
CA-I BLDV-SCNC: 1.12 MMOL/L
CABOT RINGS BLD QL SMEAR: ABNORMAL
CABOT RINGS BLD QL SMEAR: ABNORMAL
CALCIUM SERPL-MCNC: 10.1 MG/DL
CHLORIDE SERPL-SCNC: 106 MMOL/L
CO2 SERPL-SCNC: 30 MMOL/L
CREAT SERPL-MCNC: 0.6 MG/DL
DACRYOCYTES BLD QL SMEAR: ABNORMAL
DACRYOCYTES BLD QL SMEAR: ABNORMAL
DIFFERENTIAL METHOD: ABNORMAL
DOHLE BOD BLD QL SMEAR: ABNORMAL
DOHLE BOD BLD QL SMEAR: ABNORMAL
EOSINOPHIL # BLD AUTO: 1.2 K/UL
EOSINOPHIL # BLD AUTO: ABNORMAL K/UL
EOSINOPHIL # BLD AUTO: ABNORMAL K/UL
EOSINOPHIL NFR BLD: 10.8 %
EOSINOPHIL NFR BLD: ABNORMAL %
EOSINOPHIL NFR BLD: ABNORMAL %
ERYTHROCYTE [DISTWIDTH] IN BLOOD BY AUTOMATED COUNT: 17.4 %
ERYTHROCYTE [DISTWIDTH] IN BLOOD BY AUTOMATED COUNT: ABNORMAL %
ERYTHROCYTE [DISTWIDTH] IN BLOOD BY AUTOMATED COUNT: ABNORMAL %
EST. GFR  (AFRICAN AMERICAN): >60 ML/MIN/1.73 M^2
EST. GFR  (NON AFRICAN AMERICAN): >60 ML/MIN/1.73 M^2
GIANT PLATELETS BLD QL SMEAR: ABNORMAL
GIANT PLATELETS BLD QL SMEAR: ABNORMAL
GLUCOSE SERPL-MCNC: 129 MG/DL
HCT VFR BLD AUTO: 31 %
HCT VFR BLD AUTO: ABNORMAL %
HCT VFR BLD AUTO: ABNORMAL %
HEINZ BOD BLD QL SMEAR: ABNORMAL
HEINZ BOD BLD QL SMEAR: ABNORMAL
HGB BLD-MCNC: 9 G/DL
HGB BLD-MCNC: ABNORMAL G/DL
HGB BLD-MCNC: ABNORMAL G/DL
HGB C CRY RBC QL MICRO: ABNORMAL
HGB C CRY RBC QL MICRO: ABNORMAL
HOWELL-JOLLY BOD BLD QL SMEAR: ABNORMAL
HOWELL-JOLLY BOD BLD QL SMEAR: ABNORMAL
HYPOCHROMIA BLD QL SMEAR: ABNORMAL
HYPOCHROMIA BLD QL SMEAR: ABNORMAL
IMM GRANULOCYTES # BLD AUTO: 0.04 K/UL
IMM GRANULOCYTES # BLD AUTO: ABNORMAL K/UL
IMM GRANULOCYTES # BLD AUTO: ABNORMAL K/UL
IMM GRANULOCYTES NFR BLD AUTO: 0.4 %
IMM GRANULOCYTES NFR BLD AUTO: ABNORMAL %
IMM GRANULOCYTES NFR BLD AUTO: ABNORMAL %
LYMPHOCYTES # BLD AUTO: 3.9 K/UL
LYMPHOCYTES # BLD AUTO: ABNORMAL K/UL
LYMPHOCYTES # BLD AUTO: ABNORMAL K/UL
LYMPHOCYTES NFR BLD: 35.2 %
LYMPHOCYTES NFR BLD: ABNORMAL %
LYMPHOCYTES NFR BLD: ABNORMAL %
MAGNESIUM SERPL-MCNC: 1.5 MG/DL
MCH RBC QN AUTO: 28.2 PG
MCH RBC QN AUTO: ABNORMAL PG
MCH RBC QN AUTO: ABNORMAL PG
MCHC RBC AUTO-ENTMCNC: 29 G/DL
MCHC RBC AUTO-ENTMCNC: ABNORMAL G/DL
MCHC RBC AUTO-ENTMCNC: ABNORMAL G/DL
MCV RBC AUTO: 97 FL
MCV RBC AUTO: ABNORMAL FL
MCV RBC AUTO: ABNORMAL FL
MEGAKARYOCYTIC FRAGMENTS: ABNORMAL
MEGAKARYOCYTIC FRAGMENTS: ABNORMAL
METAMYELOCYTES NFR BLD MANUAL: ABNORMAL %
METAMYELOCYTES NFR BLD MANUAL: ABNORMAL %
MONOCYTES # BLD AUTO: 1.2 K/UL
MONOCYTES # BLD AUTO: ABNORMAL K/UL
MONOCYTES # BLD AUTO: ABNORMAL K/UL
MONOCYTES NFR BLD: 10.8 %
MONOCYTES NFR BLD: ABNORMAL %
MONOCYTES NFR BLD: ABNORMAL %
MYELOCYTES NFR BLD MANUAL: ABNORMAL %
MYELOCYTES NFR BLD MANUAL: ABNORMAL %
NEUTROPHILS # BLD AUTO: 4.7 K/UL
NEUTROPHILS # BLD AUTO: ABNORMAL K/UL
NEUTROPHILS # BLD AUTO: ABNORMAL K/UL
NEUTROPHILS NFR BLD: 42.1 %
NEUTROPHILS NFR BLD: ABNORMAL %
NEUTROPHILS NFR BLD: ABNORMAL %
NEUTS BAND NFR BLD MANUAL: ABNORMAL %
NEUTS BAND NFR BLD MANUAL: ABNORMAL %
NRBC BLD-RTO: 0 /100 WBC
NRBC BLD-RTO: ABNORMAL /100 WBC
NRBC BLD-RTO: ABNORMAL /100 WBC
OVALOCYTES BLD QL SMEAR: ABNORMAL
OVALOCYTES BLD QL SMEAR: ABNORMAL
PAPPENHEIMER BOD BLD QL SMEAR: ABNORMAL
PAPPENHEIMER BOD BLD QL SMEAR: ABNORMAL
PHOSPHATE SERPL-MCNC: 3.9 MG/DL
PLASMODIUM BLD QL SMEAR: ABNORMAL
PLASMODIUM BLD QL SMEAR: ABNORMAL
PLATELET # BLD AUTO: 112 K/UL
PLATELET # BLD AUTO: ABNORMAL K/UL
PLATELET # BLD AUTO: ABNORMAL K/UL
PLATELET BLD QL SMEAR: ABNORMAL
PLATELET BLD QL SMEAR: ABNORMAL
PMV BLD AUTO: 12.5 FL
PMV BLD AUTO: ABNORMAL FL
PMV BLD AUTO: ABNORMAL FL
POCT GLUCOSE: 107 MG/DL (ref 70–110)
POCT GLUCOSE: 115 MG/DL (ref 70–110)
POCT GLUCOSE: 118 MG/DL (ref 70–110)
POCT GLUCOSE: 123 MG/DL (ref 70–110)
POCT GLUCOSE: 145 MG/DL (ref 70–110)
POCT GLUCOSE: 154 MG/DL (ref 70–110)
POIKILOCYTOSIS BLD QL SMEAR: ABNORMAL
POIKILOCYTOSIS BLD QL SMEAR: ABNORMAL
POLYCHROMASIA BLD QL SMEAR: ABNORMAL
POLYCHROMASIA BLD QL SMEAR: ABNORMAL
POTASSIUM SERPL-SCNC: 5 MMOL/L
PROMYELOCYTES NFR BLD MANUAL: ABNORMAL %
PROMYELOCYTES NFR BLD MANUAL: ABNORMAL %
PROT SERPL-MCNC: 5.9 G/DL
RBC # BLD AUTO: 3.19 M/UL
RBC # BLD AUTO: ABNORMAL M/UL
RBC # BLD AUTO: ABNORMAL M/UL
RBC AGGLUT BLD QL: ABNORMAL
RBC AGGLUT BLD QL: ABNORMAL
ROULEAUX BLD QL SMEAR: ABNORMAL
ROULEAUX BLD QL SMEAR: ABNORMAL
SCHISTOCYTES BLD QL SMEAR: ABNORMAL
SICKLE CELLS BLD QL SMEAR: ABNORMAL
SICKLE CELLS BLD QL SMEAR: ABNORMAL
SMUDGE CELLS BLD QL SMEAR: ABNORMAL
SMUDGE CELLS BLD QL SMEAR: ABNORMAL
SODIUM SERPL-SCNC: 143 MMOL/L
SPHEROCYTES BLD QL SMEAR: ABNORMAL
SPHEROCYTES BLD QL SMEAR: ABNORMAL
STOMATOCYTES BLD QL SMEAR: ABNORMAL
STOMATOCYTES BLD QL SMEAR: ABNORMAL
TARGETS BLD QL SMEAR: ABNORMAL
TARGETS BLD QL SMEAR: ABNORMAL
TOXIC GRANULES BLD QL SMEAR: ABNORMAL
TOXIC GRANULES BLD QL SMEAR: ABNORMAL
WBC # BLD AUTO: 11.06 K/UL
WBC # BLD AUTO: ABNORMAL K/UL
WBC # BLD AUTO: ABNORMAL K/UL
WBC NRBC COR # BLD: ABNORMAL K/UL
WBC NRBC COR # BLD: ABNORMAL K/UL
WBC OTHER NFR BLD MANUAL: ABNORMAL %
WBC OTHER NFR BLD MANUAL: ABNORMAL %
WBC TOXIC VACUOLES BLD QL SMEAR: ABNORMAL
WBC TOXIC VACUOLES BLD QL SMEAR: ABNORMAL

## 2019-02-17 PROCEDURE — 94664 DEMO&/EVAL PT USE INHALER: CPT

## 2019-02-17 PROCEDURE — 25000003 PHARM REV CODE 250: Performed by: SURGERY

## 2019-02-17 PROCEDURE — 25000003 PHARM REV CODE 250: Performed by: STUDENT IN AN ORGANIZED HEALTH CARE EDUCATION/TRAINING PROGRAM

## 2019-02-17 PROCEDURE — 99231 PR SUBSEQUENT HOSPITAL CARE,LEVL I: ICD-10-PCS | Mod: ,,, | Performed by: NURSE PRACTITIONER

## 2019-02-17 PROCEDURE — 83735 ASSAY OF MAGNESIUM: CPT

## 2019-02-17 PROCEDURE — 63600175 PHARM REV CODE 636 W HCPCS: Performed by: STUDENT IN AN ORGANIZED HEALTH CARE EDUCATION/TRAINING PROGRAM

## 2019-02-17 PROCEDURE — A4216 STERILE WATER/SALINE, 10 ML: HCPCS | Performed by: SURGERY

## 2019-02-17 PROCEDURE — 63600175 PHARM REV CODE 636 W HCPCS: Performed by: PHYSICIAN ASSISTANT

## 2019-02-17 PROCEDURE — 94761 N-INVAS EAR/PLS OXIMETRY MLT: CPT

## 2019-02-17 PROCEDURE — 85025 COMPLETE CBC W/AUTO DIFF WBC: CPT

## 2019-02-17 PROCEDURE — 99231 SBSQ HOSP IP/OBS SF/LOW 25: CPT | Mod: ,,, | Performed by: NURSE PRACTITIONER

## 2019-02-17 PROCEDURE — 27000646 HC AEROBIKA DEVICE

## 2019-02-17 PROCEDURE — 63600175 PHARM REV CODE 636 W HCPCS: Performed by: SURGERY

## 2019-02-17 PROCEDURE — 80053 COMPREHEN METABOLIC PANEL: CPT

## 2019-02-17 PROCEDURE — 27000221 HC OXYGEN, UP TO 24 HOURS

## 2019-02-17 PROCEDURE — C9113 INJ PANTOPRAZOLE SODIUM, VIA: HCPCS | Performed by: PHYSICIAN ASSISTANT

## 2019-02-17 PROCEDURE — 99900035 HC TECH TIME PER 15 MIN (STAT)

## 2019-02-17 PROCEDURE — 82330 ASSAY OF CALCIUM: CPT

## 2019-02-17 PROCEDURE — 20600001 HC STEP DOWN PRIVATE ROOM

## 2019-02-17 PROCEDURE — 84100 ASSAY OF PHOSPHORUS: CPT

## 2019-02-17 PROCEDURE — 25000003 PHARM REV CODE 250: Performed by: PHYSICIAN ASSISTANT

## 2019-02-17 RX ORDER — INSULIN ASPART 100 [IU]/ML
0-5 INJECTION, SOLUTION INTRAVENOUS; SUBCUTANEOUS EVERY 4 HOURS PRN
Status: DISCONTINUED | OUTPATIENT
Start: 2019-02-17 | End: 2019-02-21

## 2019-02-17 RX ORDER — HYDROMORPHONE HYDROCHLORIDE 1 MG/ML
0.5 INJECTION, SOLUTION INTRAMUSCULAR; INTRAVENOUS; SUBCUTANEOUS EVERY 6 HOURS PRN
Status: DISCONTINUED | OUTPATIENT
Start: 2019-02-17 | End: 2019-02-19

## 2019-02-17 RX ORDER — MAGNESIUM SULFATE HEPTAHYDRATE 40 MG/ML
2 INJECTION, SOLUTION INTRAVENOUS
Status: DISPENSED | OUTPATIENT
Start: 2019-02-17 | End: 2019-02-17

## 2019-02-17 RX ADMIN — Medication 10 ML: at 01:02

## 2019-02-17 RX ADMIN — MICONAZOLE NITRATE: 20 OINTMENT TOPICAL at 09:02

## 2019-02-17 RX ADMIN — CIPROFLOXACIN HYDROCHLORIDE 500 MG: 500 TABLET, FILM COATED ORAL at 09:02

## 2019-02-17 RX ADMIN — HYDROMORPHONE HYDROCHLORIDE 0.5 MG: 1 INJECTION, SOLUTION INTRAMUSCULAR; INTRAVENOUS; SUBCUTANEOUS at 09:02

## 2019-02-17 RX ADMIN — Medication 10 ML: at 12:02

## 2019-02-17 RX ADMIN — ASPIRIN 81 MG CHEWABLE TABLET 81 MG: 81 TABLET CHEWABLE at 09:02

## 2019-02-17 RX ADMIN — STANDARDIZED SENNA CONCENTRATE AND DOCUSATE SODIUM 1 TABLET: 8.6; 5 TABLET, FILM COATED ORAL at 09:02

## 2019-02-17 RX ADMIN — STANDARDIZED SENNA CONCENTRATE AND DOCUSATE SODIUM 1 TABLET: 8.6; 5 TABLET, FILM COATED ORAL at 08:02

## 2019-02-17 RX ADMIN — CLOPIDOGREL 75 MG: 75 TABLET, FILM COATED ORAL at 09:02

## 2019-02-17 RX ADMIN — METRONIDAZOLE 500 MG: 500 TABLET ORAL at 09:02

## 2019-02-17 RX ADMIN — DULOXETINE 60 MG: 60 CAPSULE, DELAYED RELEASE ORAL at 09:02

## 2019-02-17 RX ADMIN — FLUTICASONE FUROATE AND VILANTEROL TRIFENATATE 1 PUFF: 100; 25 POWDER RESPIRATORY (INHALATION) at 09:02

## 2019-02-17 RX ADMIN — METRONIDAZOLE 500 MG: 500 TABLET ORAL at 05:02

## 2019-02-17 RX ADMIN — ONDANSETRON 4 MG: 2 INJECTION INTRAMUSCULAR; INTRAVENOUS at 10:02

## 2019-02-17 RX ADMIN — FUROSEMIDE 40 MG: 40 TABLET ORAL at 05:02

## 2019-02-17 RX ADMIN — DILTIAZEM HYDROCHLORIDE 180 MG: 180 CAPSULE, COATED, EXTENDED RELEASE ORAL at 09:02

## 2019-02-17 RX ADMIN — PANTOPRAZOLE SODIUM 40 MG: 40 INJECTION, POWDER, LYOPHILIZED, FOR SOLUTION INTRAVENOUS at 08:02

## 2019-02-17 RX ADMIN — Medication 10 ML: at 05:02

## 2019-02-17 RX ADMIN — Medication 10 ML: at 06:02

## 2019-02-17 RX ADMIN — HYDROCODONE BITARTRATE AND ACETAMINOPHEN 15 ML: 7.5; 325 SOLUTION ORAL at 07:02

## 2019-02-17 RX ADMIN — CIPROFLOXACIN HYDROCHLORIDE 500 MG: 500 TABLET, FILM COATED ORAL at 08:02

## 2019-02-17 RX ADMIN — MICONAZOLE NITRATE: 20 OINTMENT TOPICAL at 08:02

## 2019-02-17 RX ADMIN — HYDROMORPHONE HYDROCHLORIDE 0.5 MG: 1 INJECTION, SOLUTION INTRAMUSCULAR; INTRAVENOUS; SUBCUTANEOUS at 01:02

## 2019-02-17 RX ADMIN — FUROSEMIDE 40 MG: 40 TABLET ORAL at 09:02

## 2019-02-17 NOTE — NURSING
Received call from lab; pt's AM labs hemolyzed. Per night RN, Pt's SL PORT is not drawing back blood. Spoke with Dr. Froylan Colmenares to ask about PORT recovery protocol: MD advised me to contact heme-onc for PORT recovery protocol. Will inform Charge RN, John Marie.

## 2019-02-17 NOTE — NURSING
A second RN and I were in pt room preparing to change her incontinence pads. Pt began coughing up sputum and then began vomiting chunky tube-feeding colored vomit. I was able to use the yankeur to suction 200mL of vomit into suction cannister with another estimated 100 mL vomit on gown and sheets. Pt states she did not aspirate; will closely monitor temp, lung sounds for s/sx aspiration. TF to PEG disconnected and stopped; PEG open to gravity draining to nieves bag. 100mL drained to nieves immediately upon placement. MD notified. Right chest Port had to be de-accessed and re-accessed using sterile procedure. Port now flushes and draws. Was able to push IV Zofran after port access. Attempts to place a PIV were unsuccessful. Before leaving the room linen was changed, major and wound care performed, cream applied, heels lifted off bed, all lines/tubing free from skin, HOB up. Pt has yankeur in right hand and access to call light. Will check on pt frequently.

## 2019-02-17 NOTE — PLAN OF CARE
Problem: Infection  Goal: Infection Symptom Resolution    Intervention: Prevent or Manage Infection  Pt turned Q2h, cleaned with wipes, linen changed and wound care prescribed cream applied. Contact precautions maintained. Blood glucose, neuro checks and VS performed Q4h. Heels floated with green boots. Tube feeding residual checked Q2h and rate adjusted to 45 mL/hr as prescribed by MD. Cap on PEG tube changed for easier flush. Free water instilled per parameter. Pt medicated for extreme pain in wounds. Every effort made to make pt comfortable and also keep pt's wounds free from urine and fecal matter. Antibiotics administered per order. WCTM.

## 2019-02-17 NOTE — PROGRESS NOTES
"[facility]  General Surgery  Progress Note    Subjective:     History of Present Illness:  64 yo W with a history of HTN, COPD on home oxygen, HFpEF, IDDMII, CVA on plavix, HTN, debility after fall and broken hip, and PE who presents to the ED with a several month history of nausea, vomiting, inability to tolerate a diet and weight loss. She has had multiple admissions in the past few months for different ailments. She presents today with continued nausea, vomiting and abdominal pain that is mostly worse in the RLQ. During the examination, she states her pain was all over her abdomen because she was retching so much. She states that she has lost close to 40lbs since her surgery and that she only able to keep broth down. She had a lap converted to open appendectomy back in August 2018 that was complicated by a wound infection, C diff and failure to thrive. This seems to persists. She is now wheelchair bound (per her) and apparently lives in [place] but is here in [place] with her daughter.    She had a CT scan in the ED which revealed an irregular shaped rim enhancing fluid collection measuring at least 4.0 x 3.0 cm ight hemipelvis at the site of prior appendectomy. Surgery was consulted for further recommendations. She was also noted to have a "knot" at the RLQ incision site that is also tender.    Post-Op Info:  Procedure(s) (LRB):  LAPAROTOMY, EXPLORATORY (N/A)  INCISION AND DRAINAGE, ABSCESS-  drainage of pelvic abscess (N/A)  EXCISION, ABSCESS- drainage abdominal wall abscess (N/A)  APPLICATION, WOUND VAC (N/A)   23 Days Post-Op     Interval History:  Tube feeds remain at 45cc/hr. Incontinent of urine.    Medications:  Continuous Infusions:  Scheduled Meds:   alteplase  2 mg Intra-Catheter Once    aspirin  81 mg Oral Daily    ciprofloxacin HCl  500 mg Oral Q12H    clopidogrel  75 mg Oral Daily    diltiaZEM  180 mg Oral Daily    DULoxetine  60 mg Oral Daily    fluticasone-vilanterol  " 1 puff Inhalation Daily    furosemide  40 mg Oral BID    glucagon (human recombinant)  1 mg Intramuscular Once    magnesium sulfate IVPB  2 g Intravenous Q2H    metroNIDAZOLE  500 mg Oral Q8H    miconazole nitrate 2%   Topical (Top) BID    pantoprozole (PROTONIX) IV  40 mg Intravenous Q12H    senna-docusate 8.6-50 mg  1 tablet Per G Tube BID    sodium chloride 0.9%  10 mL Intravenous Q6H     PRN Meds:albuterol sulfate, albuterol-ipratropium, dextrose 50%, diclofenac sodium, diphenhydrAMINE, glucagon (human recombinant), hydrALAZINE, hydrocodone-apap 7.5-325 MG/15 ML, hydrocodone-apap 7.5-325 MG/15 ML, insulin aspart U-100, ondansetron, promethazine (PHENERGAN) IVPB, Flushing PICC Protocol **AND** sodium chloride 0.9% **AND** sodium chloride 0.9%, sodium chloride 0.9%, sodium chloride 0.9%     Review of patient's allergies indicates:   Allergen Reactions    Ace inhibitors Swelling    Carvedilol      Other reaction(s): Other (See Comments)  blister    Hydralazine analogues     Metformin Nausea And Vomiting    Tetracycline Itching    Tetracyclines Swelling    Travatan (with benzalkonium) [travoprost (benzalkonium)]     Travoprost Itching     Other reaction(s): Other (See Comments)  Blurry vision     Objective:     Vital Signs (Most Recent):  Temp: 98.4 °F (36.9 °C) (02/17/19 0737)  Pulse: 100 (02/17/19 0903)  Resp: (!) 22 (02/17/19 0903)  BP: 129/63 (02/17/19 0737)  SpO2: (!) 91 % (02/17/19 0903) Vital Signs (24h Range):  Temp:  [97.3 °F (36.3 °C)-98.4 °F (36.9 °C)] 98.4 °F (36.9 °C)  Pulse:  [] 100  Resp:  [16-22] 22  SpO2:  [89 %-100 %] 91 %  BP: (100-129)/(52-63) 129/63     Weight: 75.4 kg (166 lb 3.6 oz)  Body mass index is 31.41 kg/m².    Intake/Output - Last 3 Shifts       02/15 0700 - 02/16 0659 02/16 0700 - 02/17 0659 02/17 0700 - 02/18 0659    P.O.   0    I.V. (mL/kg)  0 (0)     NG/GT 1049 316 569    Total Intake(mL/kg) 1049 (13.9) 316 (4.2) 569 (7.5)    Urine (mL/kg/hr) 100 (0.1) 0 (0)      Drains 40 35     Stool 0 0     Total Output 140 35     Net +909 +281 +569           Urine Occurrence 3 x 3 x 5 x    Stool Occurrence 0 x 5 x           Physical Exam   Constitutional: She appears well-developed.   Neck: Normal range of motion. Neck supple.   Cardiovascular: Normal rate and regular rhythm.   Pulmonary/Chest: Effort normal and breath sounds normal.   Abdominal: Soft. She exhibits no distension. There is no tenderness.       Musculoskeletal: She exhibits no edema.   Neurological: She is alert.   Skin: Skin is warm and dry.   Breakdown noted on backside and anterior abdominal wall       Significant Labs:  CBC:   Recent Labs   Lab 02/17/19  0443   WBC SEE COMMENT  SEE COMMENT   RBC SEE COMMENT  SEE COMMENT   HGB SEE COMMENT  SEE COMMENT   HCT SEE COMMENT  SEE COMMENT   PLT SEE COMMENT  SEE COMMENT   MCV SEE COMMENT  SEE COMMENT   MCH SEE COMMENT  SEE COMMENT   MCHC SEE COMMENT  SEE COMMENT     CMP:   Recent Labs   Lab 02/17/19  0443   *   CALCIUM 10.1   ALBUMIN 1.5*   PROT 5.9*      K 5.0   CO2 30*      BUN 10   CREATININE 0.6   ALKPHOS 1,732*   ALT 34   *   BILITOT 3.3*       Significant Diagnostics:  None    Assessment/Plan:     * Pelvic abscess in female    -S/p ex-lap with drainage of abscess. On antibiotics.  -Vanc completed.   -On antibiotics; date of completion estimated 2/26/19     Urinary incontinence without sensory awareness    purewick     Elevated liver enzymes    -AST uptrending  -Tbili 3.1  -Alk phos 1600 today  -CT showed biliary sludge     Acute blood loss anemia    -H/H stable  -Last transfused 2/11/19     Dysarthria and anarthria    -PEG placed 2/11/19     Multiple skin tears    -Wound care following     Depression    -Continue Cymbalta     Hypophosphatemia    -Replace as needed     Debility    -PT/OT; history of fall with non displaced oblique right tibia fracture at metaphysis into diaphysis. Was wearing brace.  -Severely debilited; needs  placement. SW consulted and following       Generalized abdominal pain    62 yo female presenting with abdominal pain, nausea, elevated lactate s/p open drainage pelvic abscess 1/25     -NPO  -Continue tube feeds  -Changed pain meds to enteral  -Continue TFs to goal 45cc/hr  -Pain/nasuea meds PRN  -Continue broad spec abx - PO cipro, flagyl - estimated end date 2/26/19  -WOCN following, appreciate help  -DVT/GI prophylaxis  -PT/OT  -Stable for discharge - discharge planning     Severe malnutrition    -TPN  -Continue tube feeds and titrate up to goal of 45cc/hr     Essential hypertension    -diltiazem, PO lasix     Gastroesophageal reflux disease without esophagitis    -Continue BID protonix     (HFpEF) heart failure with preserved ejection fraction    Last echo 8/2018 with no WMAs, grade 1DD, EF 60%  -Strict I/O, daily weights  -Continue diuresis     Moderate asthma without complication    -Continue with scheduled duonebs     Elevated troponin    -Cardiology consulted. On ASA/plavix at home     CAD (coronary artery disease)    -ASA, plavix     Type 2 diabetes mellitus    -SSI, appreciate endocrine assistance          Cassie Choudhary MD  General Surgery  [facility]

## 2019-02-17 NOTE — ASSESSMENT & PLAN NOTE
62 yo female presenting with abdominal pain, nausea, elevated lactate s/p open drainage pelvic abscess 1/25     -NPO  -Continue tube feeds  -Changed pain meds to enteral  -Continue TFs to goal 45cc/hr  -Pain/nasuea meds PRN  -Continue broad spec abx - PO cipro, flagyl - estimated end date 2/26/19  -WOCN following, appreciate help  -DVT/GI prophylaxis  -PT/OT  -Stable for discharge - discharge planning

## 2019-02-17 NOTE — ASSESSMENT & PLAN NOTE
BG goal 140 - 180     Low dose correction scale  BG monitoring q 4 hours    Discharge planning: TBD

## 2019-02-17 NOTE — NURSING
Have seen and acknowledged order for external nieves catheter. Previous attempts with Purewick unsuccessful d/t extreme excoriation and edema of labia and groin. Will attempt again today. Dr. Colmenares notified of previous unsuccessful external catheter attempts.

## 2019-02-17 NOTE — PROGRESS NOTES
[facility]  Endocrinology  Progress Note    Admit Date: 1/23/2019     Reason for Consult: Management of T2DM, Hyperglycemia     Surgical Procedure and Date:      LAPAROTOMY, EXPLORATORY (N/A)     INCISION AND DRAINAGE, ABSCESS-  drainage of pelvic abscess (N/A)     EXCISION, ABSCESS- drainage abdominal wall abscess (N/A)     APPLICATION, WOUND VAC (N/A)       Diabetes diagnosis year: 20 years ago    Home Diabetes Medications:  Reports being taken off of insulin prior to admission.      Levemir 20 units BID     Humalog 16 units TIDWM with correction     How often checking glucose at home? >4 x day   BG readings on regimen: 110's  Hypoglycemia on the regimen?  Yes - 40's  Missed doses on regimen?  No    Diabetes Complications include:     Hypoglycemia     Complicating diabetes co morbidities:   HLD, CVA, and CAD    Lab Results   Component Value Date    HGBA1C 4.7 01/24/2019       HPI:   Patient is a 63 y.o. female with a diagnosis of sepsis and lactic acidosis secondary to abdominal abscess. Also has diagnosis of COPD, asthma, CAD, CVA (2012), and DM2. Patient receives primary care for DM in [place]. Her primary care giver is her daughter. Patient's DM is well controlled by primary Endocrinologist in [place]. Patient has not been on insulin regimen for the past 3 weeks leading up to hospitalization. According to daughter, patient was not eating, and having low BG reading. Therefore, insulin regimen was stopped by patient's primary Endocrinologist. [place] [facility] [place] to manage DM/hyperglycemia.             Interval HPI:   Overnight events: Remains in [facility]. NAON. TF held for a several hours overnight due to residuals. BG at goal with correction scale coverage.   Eating:   NPO  Nausea: yes; unable to tolerate at 45 cc/hr per family- does better at 35 cc/hr  Hypoglycemia and intervention: No  Fever: No  TF: glucerna at 45 cc/hr (goal - paused for several hours)      /63 (BP  "Location: Left arm, Patient Position: Lying)   Pulse 94   Temp 98.4 °F (36.9 °C) (Oral)   Resp (!) 22   Ht 5' 1" (1.549 m)   Wt 75.4 kg (166 lb 3.6 oz)   LMP  (LMP Unknown)   SpO2 99%   Breastfeeding? No   BMI 31.41 kg/m²      Labs Reviewed and Include    Recent Labs   Lab 02/17/19  0443   *   CALCIUM 10.1   ALBUMIN 1.5*   PROT 5.9*      K 5.0   CO2 30*      BUN 10   CREATININE 0.6   ALKPHOS 1,732*   ALT 34   *   BILITOT 3.3*     Lab Results   Component Value Date    WBC SEE COMMENT 02/17/2019    WBC SEE COMMENT 02/17/2019    HGB SEE COMMENT 02/17/2019    HGB SEE COMMENT 02/17/2019    HCT SEE COMMENT 02/17/2019    HCT SEE COMMENT 02/17/2019    MCV SEE COMMENT 02/17/2019    MCV SEE COMMENT 02/17/2019    PLT SEE COMMENT 02/17/2019    PLT SEE COMMENT 02/17/2019     No results for input(s): TSH, FREET4 in the last 168 hours.  Lab Results   Component Value Date    HGBA1C 4.7 01/24/2019       Nutritional status:   Body mass index is 31.41 kg/m².  Lab Results   Component Value Date    ALBUMIN 1.5 (L) 02/17/2019    ALBUMIN 1.4 (L) 02/16/2019    ALBUMIN 1.4 (L) 02/15/2019     Lab Results   Component Value Date    PREALBUMIN 5 (L) 02/14/2019    PREALBUMIN 6 (L) 02/07/2019    PREALBUMIN 6 (L) 02/07/2019       Estimated Creatinine Clearance: 89.1 mL/min (based on SCr of 0.6 mg/dL).    Accu-Checks  Recent Labs     02/15/19  1748 02/15/19  2034 02/16/19  0416 02/16/19  0838 02/16/19  1255 02/16/19  1714 02/16/19  2221 02/17/19  0234 02/17/19  0504 02/17/19  0739   POCTGLUCOSE 122* 128* 127* 131* 136* 132* 158* 145* 154* 123*       Current Medications and/or Treatments Impacting Glycemic Control  Immunotherapy:    Immunosuppressants     None        Steroids:   Hormones (From admission, onward)    None        Pressors:    Autonomic Drugs (From admission, onward)    None        Hyperglycemia/Diabetes Medications:   Antihyperglycemics (From admission, onward)    Start     Stop Route Frequency " Ordered    02/03/19 1016  insulin aspart U-100 pen 1-10 Units      -- SubQ Every 4 hours PRN 02/03/19 0916          ASSESSMENT and PLAN    * Pelvic abscess in female    Managed per primary team  Avoid hypoglycemia  Optimize BG control to improve wound healing         Type 2 diabetes mellitus    BG goal 140 - 180       BG monitoring every 4 hours and low  dose correction scale.     Discharge planning: TBD         CAD (coronary artery disease)    Managed per primary team  Condition may cause insulin resistance          On enteral nutrition    May elevate BG.        Overweight (BMI 25.0-29.9)    Body mass index is 31.41 kg/m².  may contribute to insulin resistance             Marta Sandoval NP  Endocrinology  [facility]

## 2019-02-17 NOTE — PLAN OF CARE
Problem: Adult Inpatient Plan of Care  Goal: Plan of Care Review  Outcome: Ongoing (interventions implemented as appropriate)  Pt alert and oriented medicated for severe pain with good results. Continues 02 via NC yo maintain sats turned and repositioned heels off loaded blood glucose monitored . TF was turned off for several hours d/t excessive residual wounds were cleaned and the antifungal cream was applied  Pt's legs and feet are very swollen and she yells out when movement occurs for care g-tube flushed patent  Midline staples remain intact port to R subclavian has become occluded and labs were not drawn via port but were instead drawn by lab

## 2019-02-17 NOTE — SUBJECTIVE & OBJECTIVE
Interval History:  Tube feeds remain at 45cc/hr. Incontinent of urine.    Medications:  Continuous Infusions:  Scheduled Meds:   alteplase  2 mg Intra-Catheter Once    aspirin  81 mg Oral Daily    ciprofloxacin HCl  500 mg Oral Q12H    clopidogrel  75 mg Oral Daily    diltiaZEM  180 mg Oral Daily    DULoxetine  60 mg Oral Daily    fluticasone-vilanterol  1 puff Inhalation Daily    furosemide  40 mg Oral BID    glucagon (human recombinant)  1 mg Intramuscular Once    magnesium sulfate IVPB  2 g Intravenous Q2H    metroNIDAZOLE  500 mg Oral Q8H    miconazole nitrate 2%   Topical (Top) BID    pantoprozole (PROTONIX) IV  40 mg Intravenous Q12H    senna-docusate 8.6-50 mg  1 tablet Per G Tube BID    sodium chloride 0.9%  10 mL Intravenous Q6H     PRN Meds:albuterol sulfate, albuterol-ipratropium, dextrose 50%, diclofenac sodium, diphenhydrAMINE, glucagon (human recombinant), hydrALAZINE, hydrocodone-apap 7.5-325 MG/15 ML, hydrocodone-apap 7.5-325 MG/15 ML, insulin aspart U-100, ondansetron, promethazine (PHENERGAN) IVPB, Flushing PICC Protocol **AND** sodium chloride 0.9% **AND** sodium chloride 0.9%, sodium chloride 0.9%, sodium chloride 0.9%     Review of patient's allergies indicates:   Allergen Reactions    Ace inhibitors Swelling    Carvedilol      Other reaction(s): Other (See Comments)  blister    Hydralazine analogues     Metformin Nausea And Vomiting    Tetracycline Itching    Tetracyclines Swelling    Travatan (with benzalkonium) [travoprost (benzalkonium)]     Travoprost Itching     Other reaction(s): Other (See Comments)  Blurry vision     Objective:     Vital Signs (Most Recent):  Temp: 98.4 °F (36.9 °C) (02/17/19 0737)  Pulse: 100 (02/17/19 0903)  Resp: (!) 22 (02/17/19 0903)  BP: 129/63 (02/17/19 0737)  SpO2: (!) 91 % (02/17/19 0903) Vital Signs (24h Range):  Temp:  [97.3 °F (36.3 °C)-98.4 °F (36.9 °C)] 98.4 °F (36.9 °C)  Pulse:  [] 100  Resp:  [16-22] 22  SpO2:  [89 %-100 %]  91 %  BP: (100-129)/(52-63) 129/63     Weight: 75.4 kg (166 lb 3.6 oz)  Body mass index is 31.41 kg/m².    Intake/Output - Last 3 Shifts       02/15 0700 - 02/16 0659 02/16 0700 - 02/17 0659 02/17 0700 - 02/18 0659    P.O.   0    I.V. (mL/kg)  0 (0)     NG/GT 1049 316 569    Total Intake(mL/kg) 1049 (13.9) 316 (4.2) 569 (7.5)    Urine (mL/kg/hr) 100 (0.1) 0 (0)     Drains 40 35     Stool 0 0     Total Output 140 35     Net +909 +281 +569           Urine Occurrence 3 x 3 x 5 x    Stool Occurrence 0 x 5 x           Physical Exam   Constitutional: She appears well-developed.   Neck: Normal range of motion. Neck supple.   Cardiovascular: Normal rate and regular rhythm.   Pulmonary/Chest: Effort normal and breath sounds normal.   Abdominal: Soft. She exhibits no distension. There is no tenderness.       Musculoskeletal: She exhibits no edema.   Neurological: She is alert.   Skin: Skin is warm and dry.   Breakdown noted on backside and anterior abdominal wall       Significant Labs:  CBC:   Recent Labs   Lab 02/17/19  0443   WBC SEE COMMENT  SEE COMMENT   RBC SEE COMMENT  SEE COMMENT   HGB SEE COMMENT  SEE COMMENT   HCT SEE COMMENT  SEE COMMENT   PLT SEE COMMENT  SEE COMMENT   MCV SEE COMMENT  SEE COMMENT   MCH SEE COMMENT  SEE COMMENT   MCHC SEE COMMENT  SEE COMMENT     CMP:   Recent Labs   Lab 02/17/19  0443   *   CALCIUM 10.1   ALBUMIN 1.5*   PROT 5.9*      K 5.0   CO2 30*      BUN 10   CREATININE 0.6   ALKPHOS 1,732*   ALT 34   *   BILITOT 3.3*       Significant Diagnostics:  None

## 2019-02-17 NOTE — SUBJECTIVE & OBJECTIVE
"Interval HPI:   [facility] TF held for a several hours overnight due to residuals. BG at goal with correction scale coverage.   Eating:   NPO  Nausea: yes; unable to tolerate at 45 cc/hr per family- does better at 35 cc/hr  Hypoglycemia and intervention: No  Fever: No  TF: glucerna at 45 cc/hr (goal - paused for several hours)      /63 (BP Location: Left arm, Patient Position: Lying)   Pulse 94   Temp 98.4 °F (36.9 °C) (Oral)   Resp (!) 22   Ht 5' 1" (1.549 m)   Wt 75.4 kg (166 lb 3.6 oz)   LMP  (LMP Unknown)   SpO2 99%   Breastfeeding? No   BMI 31.41 kg/m²     Labs Reviewed and Include    Recent Labs   Lab 02/17/19  0443   *   CALCIUM 10.1   ALBUMIN 1.5*   PROT 5.9*      K 5.0   CO2 30*      BUN 10   CREATININE 0.6   ALKPHOS 1,732*   ALT 34   *   BILITOT 3.3*     Lab Results   Component Value Date    WBC SEE COMMENT 02/17/2019    WBC SEE COMMENT 02/17/2019    HGB SEE COMMENT 02/17/2019    HGB SEE COMMENT 02/17/2019    HCT SEE COMMENT 02/17/2019    HCT SEE COMMENT 02/17/2019    MCV SEE COMMENT 02/17/2019    MCV SEE COMMENT 02/17/2019    PLT SEE COMMENT 02/17/2019    PLT SEE COMMENT 02/17/2019     No results for input(s): TSH, FREET4 in the last 168 hours.  Lab Results   Component Value Date    HGBA1C 4.7 01/24/2019       Nutritional status:   Body mass index is 31.41 kg/m².  Lab Results   Component Value Date    ALBUMIN 1.5 (L) 02/17/2019    ALBUMIN 1.4 (L) 02/16/2019    ALBUMIN 1.4 (L) 02/15/2019     Lab Results   Component Value Date    PREALBUMIN 5 (L) 02/14/2019    PREALBUMIN 6 (L) 02/07/2019    PREALBUMIN 6 (L) 02/07/2019       Estimated Creatinine Clearance: 89.1 mL/min (based on SCr of 0.6 mg/dL).    Accu-Checks  Recent Labs     02/15/19  1748 02/15/19  2034 02/16/19  0416 02/16/19  0838 02/16/19  1255 02/16/19  1714 02/16/19  2221 02/17/19  0234 02/17/19  0504 02/17/19  0739   POCTGLUCOSE 122* 128* 127* 131* 136* 132* 158* 145* 154* " 123*       Current Medications and/or Treatments Impacting Glycemic Control  Immunotherapy:    Immunosuppressants     None        Steroids:   Hormones (From admission, onward)    None        Pressors:    Autonomic Drugs (From admission, onward)    None        Hyperglycemia/Diabetes Medications:   Antihyperglycemics (From admission, onward)    Start     Stop Route Frequency Ordered    02/03/19 1016  insulin aspart U-100 pen 1-10 Units      -- SubQ Every 4 hours PRN 02/03/19 0916

## 2019-02-18 PROBLEM — R29.818 NEUROLOGICAL FINDING: Status: ACTIVE | Noted: 2019-02-18

## 2019-02-18 LAB
ALBUMIN SERPL BCP-MCNC: 1.3 G/DL
ALBUMIN SERPL BCP-MCNC: 1.4 G/DL
ALLENS TEST: ABNORMAL
ALP SERPL-CCNC: 1568 U/L
ALP SERPL-CCNC: 1657 U/L
ALT SERPL W/O P-5'-P-CCNC: 28 U/L
ALT SERPL W/O P-5'-P-CCNC: 28 U/L
AMMONIA PLAS-SCNC: 90 UMOL/L
ANION GAP SERPL CALC-SCNC: 4 MMOL/L
ANION GAP SERPL CALC-SCNC: 5 MMOL/L
ANISOCYTOSIS BLD QL SMEAR: SLIGHT
APTT BLDCRRT: 32.3 SEC
AST SERPL-CCNC: 103 U/L
AST SERPL-CCNC: 107 U/L
BASO STIPL BLD QL SMEAR: ABNORMAL
BASOPHILS # BLD AUTO: 0.1 K/UL
BASOPHILS NFR BLD: 0.8 %
BILIRUB SERPL-MCNC: 3.6 MG/DL
BILIRUB SERPL-MCNC: 3.7 MG/DL
BUN SERPL-MCNC: 9 MG/DL
BUN SERPL-MCNC: 9 MG/DL
CA-I BLDV-SCNC: 1.24 MMOL/L
CALCIUM SERPL-MCNC: 9.9 MG/DL
CALCIUM SERPL-MCNC: 9.9 MG/DL
CHLORIDE SERPL-SCNC: 101 MMOL/L
CHLORIDE SERPL-SCNC: 102 MMOL/L
CO2 SERPL-SCNC: 39 MMOL/L
CO2 SERPL-SCNC: 39 MMOL/L
CREAT SERPL-MCNC: 0.8 MG/DL
CREAT SERPL-MCNC: 0.8 MG/DL
DELSYS: ABNORMAL
DELSYS: ABNORMAL
DIFFERENTIAL METHOD: ABNORMAL
EOSINOPHIL # BLD AUTO: 1.1 K/UL
EOSINOPHIL NFR BLD: 9.1 %
EP: 8
EP: 8
ERYTHROCYTE [DISTWIDTH] IN BLOOD BY AUTOMATED COUNT: 17.5 %
EST. GFR  (AFRICAN AMERICAN): >60 ML/MIN/1.73 M^2
EST. GFR  (AFRICAN AMERICAN): >60 ML/MIN/1.73 M^2
EST. GFR  (NON AFRICAN AMERICAN): >60 ML/MIN/1.73 M^2
EST. GFR  (NON AFRICAN AMERICAN): >60 ML/MIN/1.73 M^2
FIO2: 30
FIO2: 30
GIANT PLATELETS BLD QL SMEAR: PRESENT
GLUCOSE SERPL-MCNC: 106 MG/DL
GLUCOSE SERPL-MCNC: 110 MG/DL
HCO3 UR-SCNC: 38.3 MMOL/L (ref 24–28)
HCO3 UR-SCNC: 39.4 MMOL/L (ref 24–28)
HCO3 UR-SCNC: 39.7 MMOL/L (ref 24–28)
HCT VFR BLD AUTO: 29.4 %
HGB BLD-MCNC: 8.1 G/DL
HYPOCHROMIA BLD QL SMEAR: ABNORMAL
IMM GRANULOCYTES # BLD AUTO: 0.05 K/UL
IMM GRANULOCYTES NFR BLD AUTO: 0.4 %
INR PPP: 1.2
IP: 12
IP: 15
LACTATE SERPL-SCNC: 1.4 MMOL/L
LYMPHOCYTES # BLD AUTO: 4.6 K/UL
LYMPHOCYTES NFR BLD: 37.7 %
MAGNESIUM SERPL-MCNC: 0.9 MG/DL
MAGNESIUM SERPL-MCNC: 1.6 MG/DL
MCH RBC QN AUTO: 27.5 PG
MCHC RBC AUTO-ENTMCNC: 27.6 G/DL
MCV RBC AUTO: 100 FL
MODE: ABNORMAL
MODE: ABNORMAL
MONOCYTES # BLD AUTO: 1.3 K/UL
MONOCYTES NFR BLD: 10.3 %
NEUTROPHILS # BLD AUTO: 5.1 K/UL
NEUTROPHILS NFR BLD: 41.7 %
NRBC BLD-RTO: 0 /100 WBC
PCO2 BLDA: 57.4 MMHG (ref 35–45)
PCO2 BLDA: 66.4 MMHG (ref 35–45)
PCO2 BLDA: 67.2 MMHG (ref 35–45)
PH SMN: 7.38 [PH] (ref 7.35–7.45)
PH SMN: 7.38 [PH] (ref 7.35–7.45)
PH SMN: 7.43 [PH] (ref 7.35–7.45)
PHOSPHATE SERPL-MCNC: 3 MG/DL
PLATELET # BLD AUTO: 85 K/UL
PLATELET BLD QL SMEAR: ABNORMAL
PMV BLD AUTO: 13.4 FL
PO2 BLDA: 64 MMHG (ref 80–100)
PO2 BLDA: 74 MMHG (ref 80–100)
PO2 BLDA: 78 MMHG (ref 80–100)
POC BE: 14 MMOL/L
POC BE: 14 MMOL/L
POC BE: 15 MMOL/L
POC SATURATED O2: 90 % (ref 95–100)
POC SATURATED O2: 94 % (ref 95–100)
POC SATURATED O2: 95 % (ref 95–100)
POC TCO2: 40 MMOL/L (ref 23–27)
POC TCO2: 41 MMOL/L (ref 23–27)
POC TCO2: 42 MMOL/L (ref 23–27)
POCT GLUCOSE: 107 MG/DL (ref 70–110)
POCT GLUCOSE: 108 MG/DL (ref 70–110)
POCT GLUCOSE: 120 MG/DL (ref 70–110)
POCT GLUCOSE: 124 MG/DL (ref 70–110)
POCT GLUCOSE: 133 MG/DL (ref 70–110)
POCT GLUCOSE: 140 MG/DL (ref 70–110)
POIKILOCYTOSIS BLD QL SMEAR: SLIGHT
POLYCHROMASIA BLD QL SMEAR: ABNORMAL
POTASSIUM SERPL-SCNC: 3.8 MMOL/L
POTASSIUM SERPL-SCNC: 3.9 MMOL/L
PREALB SERPL-MCNC: 4 MG/DL
PROT SERPL-MCNC: 5.4 G/DL
PROT SERPL-MCNC: 5.6 G/DL
PROTHROMBIN TIME: 11.8 SEC
RBC # BLD AUTO: 2.95 M/UL
SAMPLE: ABNORMAL
SITE: ABNORMAL
SODIUM SERPL-SCNC: 144 MMOL/L
SODIUM SERPL-SCNC: 146 MMOL/L
SPHEROCYTES BLD QL SMEAR: ABNORMAL
TARGETS BLD QL SMEAR: ABNORMAL
WBC # BLD AUTO: 12.17 K/UL

## 2019-02-18 PROCEDURE — 25000003 PHARM REV CODE 250: Performed by: STUDENT IN AN ORGANIZED HEALTH CARE EDUCATION/TRAINING PROGRAM

## 2019-02-18 PROCEDURE — C9113 INJ PANTOPRAZOLE SODIUM, VIA: HCPCS | Performed by: PHYSICIAN ASSISTANT

## 2019-02-18 PROCEDURE — 25000003 PHARM REV CODE 250: Performed by: SURGERY

## 2019-02-18 PROCEDURE — 82330 ASSAY OF CALCIUM: CPT

## 2019-02-18 PROCEDURE — 82140 ASSAY OF AMMONIA: CPT

## 2019-02-18 PROCEDURE — 99223 PR INITIAL HOSPITAL CARE,LEVL III: ICD-10-PCS | Mod: GC,,, | Performed by: PSYCHIATRY & NEUROLOGY

## 2019-02-18 PROCEDURE — 93005 ELECTROCARDIOGRAM TRACING: CPT

## 2019-02-18 PROCEDURE — 25500020 PHARM REV CODE 255: Performed by: STUDENT IN AN ORGANIZED HEALTH CARE EDUCATION/TRAINING PROGRAM

## 2019-02-18 PROCEDURE — 82803 BLOOD GASES ANY COMBINATION: CPT

## 2019-02-18 PROCEDURE — 85610 PROTHROMBIN TIME: CPT

## 2019-02-18 PROCEDURE — 80053 COMPREHEN METABOLIC PANEL: CPT | Mod: 91

## 2019-02-18 PROCEDURE — A4216 STERILE WATER/SALINE, 10 ML: HCPCS | Performed by: SURGERY

## 2019-02-18 PROCEDURE — 94761 N-INVAS EAR/PLS OXIMETRY MLT: CPT

## 2019-02-18 PROCEDURE — 94664 DEMO&/EVAL PT USE INHALER: CPT

## 2019-02-18 PROCEDURE — 20600001 HC STEP DOWN PRIVATE ROOM

## 2019-02-18 PROCEDURE — 36415 COLL VENOUS BLD VENIPUNCTURE: CPT

## 2019-02-18 PROCEDURE — 99223 1ST HOSP IP/OBS HIGH 75: CPT | Mod: GC,,, | Performed by: PSYCHIATRY & NEUROLOGY

## 2019-02-18 PROCEDURE — 63600175 PHARM REV CODE 636 W HCPCS: Performed by: STUDENT IN AN ORGANIZED HEALTH CARE EDUCATION/TRAINING PROGRAM

## 2019-02-18 PROCEDURE — 85730 THROMBOPLASTIN TIME PARTIAL: CPT

## 2019-02-18 PROCEDURE — 83735 ASSAY OF MAGNESIUM: CPT | Mod: 91

## 2019-02-18 PROCEDURE — 27000646 HC AEROBIKA DEVICE

## 2019-02-18 PROCEDURE — 84100 ASSAY OF PHOSPHORUS: CPT

## 2019-02-18 PROCEDURE — 85025 COMPLETE CBC W/AUTO DIFF WBC: CPT

## 2019-02-18 PROCEDURE — 93010 EKG 12-LEAD: ICD-10-PCS | Mod: ,,, | Performed by: INTERNAL MEDICINE

## 2019-02-18 PROCEDURE — 25000003 PHARM REV CODE 250: Performed by: PHYSICIAN ASSISTANT

## 2019-02-18 PROCEDURE — 83605 ASSAY OF LACTIC ACID: CPT

## 2019-02-18 PROCEDURE — 99900035 HC TECH TIME PER 15 MIN (STAT)

## 2019-02-18 PROCEDURE — 25500020 PHARM REV CODE 255: Performed by: SURGERY

## 2019-02-18 PROCEDURE — 27000190 HC CPAP FULL FACE MASK W/VALVE

## 2019-02-18 PROCEDURE — 63600175 PHARM REV CODE 636 W HCPCS: Performed by: PHYSICIAN ASSISTANT

## 2019-02-18 PROCEDURE — 84134 ASSAY OF PREALBUMIN: CPT

## 2019-02-18 PROCEDURE — 94660 CPAP INITIATION&MGMT: CPT

## 2019-02-18 PROCEDURE — 36600 WITHDRAWAL OF ARTERIAL BLOOD: CPT

## 2019-02-18 PROCEDURE — 27000221 HC OXYGEN, UP TO 24 HOURS

## 2019-02-18 PROCEDURE — 93010 ELECTROCARDIOGRAM REPORT: CPT | Mod: ,,, | Performed by: INTERNAL MEDICINE

## 2019-02-18 PROCEDURE — 80053 COMPREHEN METABOLIC PANEL: CPT

## 2019-02-18 RX ORDER — MAGNESIUM SULFATE HEPTAHYDRATE 40 MG/ML
2 INJECTION, SOLUTION INTRAVENOUS ONCE
Status: DISCONTINUED | OUTPATIENT
Start: 2019-02-18 | End: 2019-02-18

## 2019-02-18 RX ORDER — LACTULOSE 10 G/15ML
20 SOLUTION ORAL 2 TIMES DAILY
Status: DISCONTINUED | OUTPATIENT
Start: 2019-02-18 | End: 2019-02-27

## 2019-02-18 RX ORDER — HEPARIN SODIUM 5000 [USP'U]/ML
5000 INJECTION, SOLUTION INTRAVENOUS; SUBCUTANEOUS EVERY 8 HOURS
Status: DISCONTINUED | OUTPATIENT
Start: 2019-02-18 | End: 2019-03-16 | Stop reason: HOSPADM

## 2019-02-18 RX ORDER — FUROSEMIDE 40 MG/1
40 TABLET ORAL DAILY
Status: DISCONTINUED | OUTPATIENT
Start: 2019-02-18 | End: 2019-02-19

## 2019-02-18 RX ORDER — LANOLIN ALCOHOL/MO/W.PET/CERES
800 CREAM (GRAM) TOPICAL ONCE
Status: DISCONTINUED | OUTPATIENT
Start: 2019-02-18 | End: 2019-02-18

## 2019-02-18 RX ORDER — MAGNESIUM SULFATE HEPTAHYDRATE 40 MG/ML
2 INJECTION, SOLUTION INTRAVENOUS ONCE
Status: COMPLETED | OUTPATIENT
Start: 2019-02-18 | End: 2019-02-18

## 2019-02-18 RX ADMIN — Medication 10 ML: at 06:02

## 2019-02-18 RX ADMIN — IOHEXOL 15 ML: 350 INJECTION, SOLUTION INTRAVENOUS at 11:02

## 2019-02-18 RX ADMIN — Medication 10 ML: at 12:02

## 2019-02-18 RX ADMIN — ASPIRIN 81 MG CHEWABLE TABLET 81 MG: 81 TABLET CHEWABLE at 09:02

## 2019-02-18 RX ADMIN — LACTULOSE 20 G: 20 SOLUTION ORAL at 10:02

## 2019-02-18 RX ADMIN — IOHEXOL 75 ML: 350 INJECTION, SOLUTION INTRAVENOUS at 02:02

## 2019-02-18 RX ADMIN — MAGNESIUM SULFATE HEPTAHYDRATE 3 G: 500 INJECTION, SOLUTION INTRAMUSCULAR; INTRAVENOUS at 09:02

## 2019-02-18 RX ADMIN — MAGNESIUM SULFATE IN WATER 2 G: 40 INJECTION, SOLUTION INTRAVENOUS at 07:02

## 2019-02-18 RX ADMIN — SODIUM CHLORIDE, SODIUM LACTATE, POTASSIUM CHLORIDE, AND CALCIUM CHLORIDE 1000 ML: .6; .31; .03; .02 INJECTION, SOLUTION INTRAVENOUS at 09:02

## 2019-02-18 RX ADMIN — PANTOPRAZOLE SODIUM 40 MG: 40 INJECTION, POWDER, LYOPHILIZED, FOR SOLUTION INTRAVENOUS at 10:02

## 2019-02-18 RX ADMIN — HYDROCODONE BITARTRATE AND ACETAMINOPHEN 15 ML: 7.5; 325 SOLUTION ORAL at 04:02

## 2019-02-18 RX ADMIN — LACTULOSE 20 G: 20 SOLUTION ORAL at 09:02

## 2019-02-18 RX ADMIN — STANDARDIZED SENNA CONCENTRATE AND DOCUSATE SODIUM 1 TABLET: 8.6; 5 TABLET, FILM COATED ORAL at 09:02

## 2019-02-18 RX ADMIN — METRONIDAZOLE 500 MG: 500 TABLET ORAL at 05:02

## 2019-02-18 RX ADMIN — CLOPIDOGREL 75 MG: 75 TABLET, FILM COATED ORAL at 09:02

## 2019-02-18 RX ADMIN — HYDROMORPHONE HYDROCHLORIDE 0.5 MG: 1 INJECTION, SOLUTION INTRAMUSCULAR; INTRAVENOUS; SUBCUTANEOUS at 05:02

## 2019-02-18 RX ADMIN — CIPROFLOXACIN HYDROCHLORIDE 500 MG: 500 TABLET, FILM COATED ORAL at 10:02

## 2019-02-18 RX ADMIN — STANDARDIZED SENNA CONCENTRATE AND DOCUSATE SODIUM 1 TABLET: 8.6; 5 TABLET, FILM COATED ORAL at 10:02

## 2019-02-18 RX ADMIN — HEPARIN SODIUM 5000 UNITS: 5000 INJECTION, SOLUTION INTRAVENOUS; SUBCUTANEOUS at 03:02

## 2019-02-18 RX ADMIN — CIPROFLOXACIN HYDROCHLORIDE 500 MG: 500 TABLET, FILM COATED ORAL at 09:02

## 2019-02-18 RX ADMIN — HEPARIN SODIUM 5000 UNITS: 5000 INJECTION, SOLUTION INTRAVENOUS; SUBCUTANEOUS at 10:02

## 2019-02-18 RX ADMIN — FUROSEMIDE 40 MG: 40 TABLET ORAL at 09:02

## 2019-02-18 RX ADMIN — HYDROCODONE BITARTRATE AND ACETAMINOPHEN 15 ML: 7.5; 325 SOLUTION ORAL at 12:02

## 2019-02-18 RX ADMIN — DILTIAZEM HYDROCHLORIDE 180 MG: 180 CAPSULE, COATED, EXTENDED RELEASE ORAL at 09:02

## 2019-02-18 RX ADMIN — DULOXETINE 60 MG: 60 CAPSULE, DELAYED RELEASE ORAL at 09:02

## 2019-02-18 RX ADMIN — MICONAZOLE NITRATE: 20 OINTMENT TOPICAL at 10:02

## 2019-02-18 RX ADMIN — HYDROCODONE BITARTRATE AND ACETAMINOPHEN 15 ML: 7.5; 325 SOLUTION ORAL at 10:02

## 2019-02-18 RX ADMIN — METRONIDAZOLE 500 MG: 500 TABLET ORAL at 10:02

## 2019-02-18 RX ADMIN — PANTOPRAZOLE SODIUM 40 MG: 40 INJECTION, POWDER, LYOPHILIZED, FOR SOLUTION INTRAVENOUS at 09:02

## 2019-02-18 RX ADMIN — MICONAZOLE NITRATE: 20 OINTMENT TOPICAL at 09:02

## 2019-02-18 RX ADMIN — METRONIDAZOLE 500 MG: 500 TABLET ORAL at 03:02

## 2019-02-18 NOTE — PT/OT/SLP PROGRESS
Occupational Therapy      Patient Name:  Sheryl Martines   MRN:  [id number]    Patient not seen today secondary to CT/MRI, Patient fatigue. RN hold for pt in AM 2/2 recent stroke code and stat CT for increased lethargy in AM. RN in PM also recommended hold as pt was placed on BiPAP, still very lethargic, and going to CT. Will follow-up tomorrow.    Chyna Dahl OT  2/18/2019

## 2019-02-18 NOTE — CARE UPDATE
BG goal 140 - 180. BG at goal without insulin. TF intermittently held due to residuals and nausea. Patient unresponsive to questions this AM and not follow commands per general surgery note; code stoke and CT this AM.       BG monitoring every 4 hours and low  dose correction scale.       ** Please call Endocrine for any BG related issues **

## 2019-02-18 NOTE — CARE UPDATE
RN Proactive Rounding Note  Time of Visit: 921    Admit Date: 2019  LOS: 25  Code Status: Full Code   Date of Visit: 2019  : 1955  Age: 63 y.o.  Sex: female  Race: Black or   Bed: [address] [place] [address] A:   MRN: [id number]  Was the patient discharged from an ICU this admission? yes   Was the patient discharged from a PACU within last 24 hours?  no  Did the patient receive conscious sedation/general anesthesia in last 24 hours?  no  Was the patient in the ED within the past 24 hours?  no  Was the patient started on NIPPV within the past 24 hours?  no  Attending Physician: Monster Laurent MD  Primary Service: Networked reference to record PCT       ASSESSMENT:     Abnormal Vital Signs: NA  Clinical Issues: Neuro     INTERVENTIONS/ RECOMMENDATIONS:     Patient remains confused, patient vomited this morning per bedside RN, coarse breath sounds heard, recommend patient get chest Xray, team ordered CT chest/abd/pelvis, Mg 0.9, 3g given, will repeat at 1500, PCo2 67.2, Orders placed for bipap, resp notified, will follow up.    Discussed plan of care with RNJeronimo [id number].    PHYSICIAN ESCALATION:     Yes/No  yes    Orders received and case discussed with Dr. Choudhary .    Disposition: Remain in room 1007.    FOLLOW-UP/CONTINGENCY:     Call back the Rapid Response Nurse at x [phone] for additional questions or concerns.

## 2019-02-18 NOTE — ASSESSMENT & PLAN NOTE
63 yr old female with COPD, prior stroke(reported bilateral thalamic infarcts in 2012) with baseline RSW and dysarthria, DM 2 who is admitted for pelvic abscess s/p exp lap and drainage. Stroke code called for worsening mentation since yesterday 10pm. Per the nurse, she was not following commands and was weaker from her baseline today. CTH negative for any acute intracranial pathology. Of note, she is on antibiotics for intraabdominal infection (cultures from 1/25 growing anaerobic bacteria).    -- Unlikely due to acute ischemic event.  Symptoms could be due to recrudesce of prior stroke (right sided deficits at baseline per family) in the setting of infection.       Antithrombotics for secondary stroke prevention: Antiplatelets: Aspirin: 81 mg daily  Clopidogrel: 75 mg daily - home meds which were resumed kandy admission.    Statins for secondary stroke prevention and hyperlipidemia, if present:   Statins: None: Reason:  elevated liver enzymes (on Zocor 40 at home)    Aggressive risk factor modification: HTN, HLD, T2DM     Rehab efforts: PT/OT/SLP to evaluate and treat    Diagnostics ordered/pending: None     VTE prophylaxis: Heparin 5000 units SQ every 8 hours    BP parameters: SBP <140

## 2019-02-18 NOTE — CONSULTS
[facility]  Vascular Neurology  Comprehensive Stroke Center  Consult Note    Consults  Assessment/Plan:     Patient is a 63 y.o. year old female with:    * Pelvic abscess in female    -- Cultures from 1/25 growing B.fragilis  -- On Cipro and Flagyl  -- Management per primary     Dysarthria    63 yr old female with COPD, prior stroke(reported bilateral thalamic infarcts in 2012) with baseline RSW and dysarthria, DM 2 who is admitted for pelvic abscess s/p exp lap and drainage. Stroke code called for worsening mentation since yesterday 10pm. Per the nurse, she was not following commands and was weaker from her baseline today. CTH negative for any acute intracranial pathology. Of note, she is on antibiotics for intraabdominal infection (cultures from 1/25 growing anaerobic bacteria).    -- Unlikely due to acute ischemic event.  Symptoms could be due to recrudesce of prior stroke (right sided deficits at baseline per family) in the setting of infection.       Antithrombotics for secondary stroke prevention: Antiplatelets: Aspirin: 81 mg daily  Clopidogrel: 75 mg daily - home meds which were resumed kandy admission.    Statins for secondary stroke prevention and hyperlipidemia, if present:   Statins: None: Reason:  elevated liver enzymes (on Zocor 40 at home)    Aggressive risk factor modification: HTN, HLD, T2DM     Rehab efforts: PT/OT/SLP to evaluate and treat    Diagnostics ordered/pending: None     VTE prophylaxis: Heparin 5000 units SQ every 8 hours    BP parameters: SBP <140              Essential hypertension    Risk factor for stroke  Avoid hypotension      (HFpEF) heart failure with preserved ejection fraction    Managed by primary team     CAD (coronary artery disease)    Risk factor for stroke       Type 2 diabetes mellitus    Risk factor for stroke  Controlled          STROKE DOCUMENTATION     Acute Stroke Times   Last Known Normal Date: 02/17/19  Last Known Normal Time: 2200  Symptom  Onset Date: 02/17/19  Stroke Team Called Date: 02/18/19  Stroke Team Called Time: 0720  Stroke Team Arrival Date: 02/18/19  Stroke Team Arrival Time: 0723    NIH Scale:  1a. Level of Consciousness: 1-->Not alert, but arousable by minor stimulation to obey, answer, or respond  1b. LOC Questions: 2-->Answers neither question correctly  1c. LOC Commands: 2-->Performs neither task correctly  2. Best Gaze: 0-->Normal  3. Visual: 0-->No visual loss  4. Facial Palsy: 2-->Partial paralysis (total or near-total paralysis of lower face)  5a. Motor Arm, Left: 1-->Drift, limb holds 90 (or 45) degrees, but drifts down before full 10 seconds, does not hit bed or other support  5b. Motor Arm, Right: 2-->Some effort against gravity, limb cannot get to or maintain (if cued) 90 (or 45) degrees, drifts down to bed, but has some effort against gravity  6a. Motor Leg, Left: 2-->Some effort against gravity, leg falls to bed by 5 secs, but has some effort against gravity  6b. Motor Leg, Right: 2-->Some effort against gravity, leg falls to bed by 5 secs, but has some effort against gravity  7. Limb Ataxia: 0-->Absent  8. Sensory: 1-->Mild-to-moderate sensory loss, patient feels pinprick is less sharp or is dull on the affected side, or there is a loss of superficial pain with pinprick, but patient is aware of being touched  9. Best Language: 0-->No aphasia, normal  10. Dysarthria: 1-->Mild-to-moderate dysarthria, patient slurs at least some words and, at worst, can be understood with some difficulty  11. Extinction and Inattention (formerly Neglect): 0-->No abnormality  Total (NIH Stroke Scale): 16    Modified [place] Score: 2  Marcos Coma Scale:    ABCD2 Score:    [id number] Score:   HAS -BLED Score:   ICH Score:   Hunt & Glez Classification:       Thrombolysis Candidate? No, Strong suspicion for stroke mimic or alternative diagnosis       Interventional Revascularization Candidate?   Is the patient eligible for mechanical endovascular  reperfusion (ELIGIO)?  No; No large vessel occlusion      Hemorrhagic change of an Ischemic Stroke: Does this patient have an ischemic stroke with hemorrhagic changes? No     Subjective:     History of Present Illness:  63 yr old female with PMH of asthma, COPD on home O2, CVA ( reported b/l thalamic infarcts in 2012), DM 2 who is admitted for abdominal abscess. She is s/p exp lap with I&D. Abscess cultures growing anaerobic bacteria and she is on antibiotics. Stroke code called at 7:20 am for worsening mental status since 10pm yesterday.  Patient has baseline RSW, facial droop and slurring of speech from her prior stroke. Patient was last normal at 10pm when she was speaking to her daughter. After the call, her slurring worsened and her mentation started to fluctuate. Today morning she was noted to be significantly weak and not following any commands. Also had intermittent tremors of her L hand. CT head showed a remote L thalamic infarct but no acute infarct/bleed.  She was previously seen by Vascular Neurology on 1/31 for acute worsening of her stroke deficits. CT head at the time did not show any acute changes and the etiology was suspected to be recrudescence.   She underwent a lap appendectomy in Aug 2018 that was complicated by a wound infection, C diff and failure to thrive. At baseline now she is wheelchair bound and requires help with her ADLs.         Past Medical History:   Diagnosis Date    Abnormal LFTs 12/14/2018    Asthma     Closed compression fracture of fourth lumbar vertebra     COPD (chronic obstructive pulmonary disease)     Coronary artery disease     Diabetes mellitus     Fall 10/4/2018    Fracture     right tib & fib    Glaucoma     High cholesterol     Hypertension     Infected incision 9/20/2018    Intractable nausea and vomiting 1/23/2019    Iritis     Pulmonary embolus     S/P appendectomy 9/11/2018    Stroke     rt sided weakness.     Past Surgical History:   Procedure  Laterality Date    ABDOMINAL SURGERY      APPENDECTOMY N/A 8/26/2018    [facility]    APPENDECTOMY, LAPAROSCOPIC---CONVERTED TO OPEN APPENDECTOMY @0950 N/A 8/26/2018    [facility] OR    APPLICATION, WOUND VAC N/A 1/25/2019    Per[facility] Laurent [facility] at [facility]    BACK SURGERY      stimulator    CATARACT EXTRACTION      EXCISION, ABSCESS- drainage abdominal wall abscess N/A 1/25/2019    Performed by [facility] Laurent [facility] at [facility] OR 2ND FLR    HYSTERECTOMY      INCISION AND DRAINAGE, ABSCESS-  drainage of pelvic abscess N/A 1/25/2019    Performed by [facility] at [facility] OR 2ND FLR    LAPAROTOMY, EXPLORATORY N/A 1/25/2019    Performed by [facility] at [facility] OR 2ND [facility]LR    TOTAL HIP ARTHROPLASTY Left     2008     Family History   Problem Relation Age of Onset    Cancer Father     Diabetes Brother     Multiple sclerosis Mother     Lupus Sister      Social History     Tobacco Use    Smoking status: Never Smoker    Smokeless tobacco: Never Used   Substance Use Topics    Alcohol use: No     Frequency: Never    Drug use: No     Review of patient's allergies indicates:   Allergen Reactions    Ace inhibitors Swelling    Carvedilol      Other reaction(s): Other (See Comments)  blister    Hydralazine analogues     Metformin Nausea And Vomiting    Tetracycline Itching    Tetracyclines Swelling    Travatan (with benzalkonium) [travoprost (benzalkonium)]     Travoprost Itching     Other reaction(s): Other (See Comments)  Blurry vision       Medications: I have reviewed the current medication administration record.    Medications Prior to Admission   Medication Sig Dispense Refill Last Dose    acetaminophen (TYLENOL) 500 MG tablet Take 1,000 mg by mouth 2 (two) times daily as needed for Pain.   1/22/2019    albuterol (PROVENTIL/VENTOLIN HFA) 90 mcg/actuation inhaler Inhale 2 puffs into the  lungs every 6 (six) hours as needed for Wheezing or Shortness of Breath. Rescue   1/22/2019    albuterol-ipratropium (DUO-NEB) 2.5 mg-0.5 mg/3 mL nebulizer solution Take 3 mLs by nebulization every 4 (four) hours as needed for Wheezing or Shortness of Breath. Rescue    1/22/2019    aspirin (ECOTRIN) 81 MG EC tablet Take 1 tablet (81 mg total) by mouth once daily. 30 tablet 11 1/22/2019    baclofen (LIORESAL) 10 MG tablet Take 10 mg by mouth 2 (two) times daily as needed (MUSCLE SPASMS).   1/22/2019    cefdinir (OMNICEF) 300 MG capsule TK ONE C PO  BID FOR 7 DAYS  0 1/22/2019    cephALEXin (KEFLEX) 750 MG capsule TK ONE C PO TID FOR 5 DAYS  0 1/22/2019    ciprofloxacin HCl (CIPRO) 250 MG tablet Take 1 tablet (250 mg total) by mouth every 12 (twelve) hours. Starting 12/15 evening 5 tablet 0 1/22/2019    clopidogrel (PLAVIX) 75 mg tablet Take 75 mg by mouth once daily.   1/22/2019    diclofenac sodium (VOLTAREN) 1 % Gel Apply 2 g topically daily as needed (PAIN).   1/22/2019    diltiaZEM (CARDIZEM CD) 180 MG 24 hr capsule Take 180 mg by mouth once daily.   1/22/2019    DULoxetine (CYMBALTA) 60 MG capsule Take 60 mg by mouth once daily.   1/22/2019    fluticasone-vilanterol (BREO ELLIPTA) 100-25 mcg/dose diskus inhaler Inhale 1 puff into the lungs once daily. Controller   1/22/2019    gabapentin (NEURONTIN) 300 MG capsule Take 300 mg by mouth once daily.   1/22/2019    Lactobacillus rhamnosus-fiber 2.5 billion cell-3.5 gram PwPk Take 1 capsule by mouth.   1/22/2019    lansoprazole (PREVACID) 30 MG capsule Take 30 mg by mouth once daily.   1/22/2019    losartan (COZAAR) 100 MG tablet Take 100 mg by mouth once daily.    1/22/2019    ondansetron (ZOFRAN) 4 MG tablet Take 1 tablet (4 mg total) by mouth every 6 (six) hours as needed. 30 tablet 1 1/22/2019    ondansetron (ZOFRAN) 4 MG tablet Take 4-8 mg by mouth.   1/22/2019    potassium chloride SA (K-DUR,KLOR-CON) 10 MEQ tablet Take 10 mEq by mouth.    1/22/2019    predniSONE (DELTASONE) 10 MG tablet Take 4 tablets (40 mg) on 12/16, then take 1 tablet (10 mg) daily 30 tablet 0 1/22/2019    pyridoxine, vitamin B6, (VITAMIN B-6) 50 MG Tab Take 1 tablet (50 mg total) by mouth once daily.  0 1/22/2019    simvastatin (ZOCOR) 40 MG tablet Take 40 mg by mouth.   1/22/2019    theophylline (THEODUR) 300 mg 24 hr capsule Take 300 mg by mouth once daily.   1/22/2019    traMADol (ULTRAM) 50 mg tablet TK 1 T PO BID PRN  0 1/22/2019    [DISCONTINUED] baclofen (LIORESAL) 10 MG tablet Take 10 mg by mouth.   1/22/2019    [DISCONTINUED] furosemide (LASIX) 40 MG tablet Take 40 mg by mouth once daily.    1/22/2019    [DISCONTINUED] HYDROcodone-acetaminophen (NORCO) 5-325 mg per tablet Take 1 tablet by mouth every 4 to 6 hours as needed. 42 tablet 0 1/22/2019    [DISCONTINUED] prazosin (MINIPRESS) 5 MG capsule TK 1 C PO BID  13 1/22/2019       Review of Systems   Unable to perform ROS: Mental status change   Constitutional: Negative for fever.   Neurological: Positive for facial asymmetry, speech difficulty and weakness.     Objective:     Vital Signs (Most Recent):  Temp: 99.9 °F (37.7 °C) (02/18/19 0800)  Pulse: 91 (02/18/19 1308)  Resp: 20 (02/18/19 1308)  BP: (!) 110/56 (02/18/19 0800)  SpO2: (!) 94 % (02/18/19 1308)    Vital Signs Range (Last 24H):  Temp:  [97.4 °F (36.3 °C)-99.9 °F (37.7 °C)]   Pulse:  []   Resp:  [16-22]   BP: (100-128)/(54-62)   SpO2:  [94 %-100 %]     Physical Exam   Constitutional: No distress.   Eyes: EOM are normal. Pupils are equal, round, and reactive to light.   Cardiovascular: Normal rate.   Pulmonary/Chest: Effort normal.   Nursing note and vitals reviewed.      Neurological Exam:   LOC: drowsy and arousable  Articulation: Dysarthria  Orientation: Not oriented to person, Not oriented to place, Not oriented to time  Facial Movement (CN VII): Lower facial weakness on the Left  Motor: Arm left  Paresis: 4/5  Leg left  Paresis: 3/5  Arm  right  Paresis: 3/5  Leg right Paresis: 3/5  Moves the left side spontaneously. Right side antigravity.      Laboratory:  CMP:   Recent Labs   Lab 02/18/19  1208   CALCIUM 9.9   ALBUMIN 1.4*   PROT 5.6*      K 3.8   CO2 39*      BUN 9   CREATININE 0.8   ALKPHOS 1,657*   ALT 28   *   BILITOT 3.7*     CBC:   Recent Labs   Lab 02/18/19  0410   WBC 12.17   RBC 2.95*   HGB 8.1*   HCT 29.4*   PLT 85*   *   MCH 27.5   MCHC 27.6*     Lipid Panel: No results for input(s): CHOL, LDLCALC, HDL, TRIG in the last 168 hours.  Coagulation:   Recent Labs   Lab 02/18/19  1208   INR 1.2   APTT 32.3*     Hgb A1C: No results for input(s): HGBA1C in the last 168 hours.  TSH: No results for input(s): TSH in the last 168 hours.    Diagnostic Results:    Brain/Vessel imaging:    CTH 2/18/19 -     Remote left thalamic lacunar infarct. No CT evidence of acute intracranial abnormality.    Cardiac Evaluation:     ECHO 1/26/19 -     · Normal left ventricular systolic function. The estimated ejection fraction is 60%  · No wall motion abnormalities.  · Normal LV diastolic function.  · Normal right ventricular systolic function.  · Mechanically ventilated; cannot use inferior caval vein diameter to estimate central venous pressure.      Christopher Burkett MD  Comprehensive Stroke Center  Department of Vascular Neurology   [facility]

## 2019-02-18 NOTE — ASSESSMENT & PLAN NOTE
62 yo female presenting with abdominal pain, nausea, elevated lactate s/p open drainage pelvic abscess 1/25     -NPO  -Continue tube feeds (previously at goal of 45 cc/hr)  -Changed pain meds to enteral  -Pain/nasuea meds PRN  -Continue broad spec abx - PO cipro, flagyl - estimated end date 2/26/19  -WOCN following, appreciate help  -DVT/GI prophylaxis  -PT/OT  -diffuse abdominal pain this am; lactate of 1.4

## 2019-02-18 NOTE — HPI
Ms. Martines is a 63-year-old female with recent appendicitis s/p appendectomy, COPD, IDDM, CAD, pontine stroke (2012) presents from SNF for intermittent altered mental status (per SNF) and lethargy of 2 days. Patient denies AMS herself and is attributing drowsiness to pain medications.   She also c/o loose stool and dizziness. She denied fever, chills, headache, chest pain, SOB, nausea, vomiting, dysuria.    Patient currently living in rehab facility for reconditioning and treatment of wound infection s/p appendectomy on 8/26. On previous admission, she presented with slurred speech and AMS. EEG was done at the time and did not show any seizure activity. She found to have appendicitis with peritonitis, and underwent open appendectomy. Her abdominal wound cultures positive for  MRSA, enterococcus, & e. coli.  Also positive for c. diff during admission. She was discharged with PO vanc (stop Sept 13), clinda (1 week), and flagyl (stop Sept 13) per ID/ surgery.    At SNF, she was on Ertapenem (started 9/20) and Vancomycin (started 9/21) until this transfered to ED for present admission.     PATIENT ID VERIFIED WITH TWO PATIENT IDENTIFIERS. PATIENT MEDICATIONS REVIEWED AND APPROVED BY DR. Singh Aburto. MEDICATIONS THAT WERE REMOVED FROM THIS VISIT HAVE BEEN APPROVED BY DR. Singh Aburto. Chief Complaint   Patient presents with    Hypertension     6 MO F/U    Cholesterol Problem       1. Have you been to the ER, urgent care clinic since your last visit? Hospitalized since your last visit? NO    2. Have you seen or consulted any other health care providers outside of the [address] [place] [address] since your last visit? Include any pap smears or colon screening.  YES PCP Dr. Elan Wyatt Jan 2019 for annual wellness exam

## 2019-02-18 NOTE — SUBJECTIVE & OBJECTIVE
Past Medical History:   Diagnosis Date    Abnormal LFTs 12/14/2018    Asthma     Closed compression fracture of fourth lumbar vertebra     COPD (chronic obstructive pulmonary disease)     Coronary artery disease     Diabetes mellitus     Fall 10/4/2018    Fracture     right tib & fib    Glaucoma     High cholesterol     Hypertension     Infected incision 9/20/2018    Intractable nausea and vomiting 1/23/2019    Iritis     Pulmonary embolus     S/P appendectomy 9/11/2018    Stroke     rt sided weakness.     Past Surgical History:   Procedure Laterality Date    ABDOMINAL SURGERY      APPENDECTOMY N/A 8/26/2018    Performed by Bernadine Melendrez MD at [facility] OR    APPENDECTOMY, LAPAROSCOPIC---CONVERTED TO OPEN APPENDECTOMY @0950 N/A 8/26/2018    Performed by Bernadine Melendrez MD at [facility] OR    APPLICATION, WOUND VAC N/A 1/25/2019    Performed by Monster Laurent MD at [facility] OR [address] [place] [address]    BACK SURGERY      stimulator    CATARACT EXTRACTION      EXCISION, ABSCESS- drainage abdominal wall abscess N/A 1/25/2019    [facility]    HYSTERECTOMY      INCISION AND DRAINAGE, ABSCESS-  drainage of pelvic abscess N/A 1/25/2019    Performed by Monster Laurent MD at [facility] OR [address] [place] [address]    LAPAROTOMY, EXPLORATORY N/A 1/25/2019    [facility]    TOTAL HIP ARTHROPLASTY Left     2008     Family History   Problem Relation Age of Onset    Cancer Father     Diabetes Brother     Multiple sclerosis Mother     Lupus Sister      Social History     Tobacco Use    Smoking status: Never Smoker    Smokeless tobacco: Never Used   Substance Use Topics    Alcohol use: No     Frequency: Never    Drug use: No     Review of patient's allergies indicates:   Allergen Reactions    Ace inhibitors Swelling    Carvedilol      Other reaction(s): Other (See Comments)  blister    Hydralazine analogues     Metformin Nausea And  Vomiting    Tetracycline Itching    Tetracyclines Swelling    Travatan (with benzalkonium) [travoprost (benzalkonium)]     Travoprost Itching     Other reaction(s): Other (See Comments)  Blurry vision       Medications: I have reviewed the current medication administration record.    Medications Prior to Admission   Medication Sig Dispense Refill Last Dose    acetaminophen (TYLENOL) 500 MG tablet Take 1,000 mg by mouth 2 (two) times daily as needed for Pain.   1/22/2019    albuterol (PROVENTIL/VENTOLIN HFA) 90 mcg/actuation inhaler Inhale 2 puffs into the lungs every 6 (six) hours as needed for Wheezing or Shortness of Breath. Rescue   1/22/2019    albuterol-ipratropium (DUO-NEB) 2.5 mg-0.5 mg/3 mL nebulizer solution Take 3 mLs by nebulization every 4 (four) hours as needed for Wheezing or Shortness of Breath. Rescue    1/22/2019    aspirin (ECOTRIN) 81 MG EC tablet Take 1 tablet (81 mg total) by mouth once daily. 30 tablet 11 1/22/2019    baclofen (LIORESAL) 10 MG tablet Take 10 mg by mouth 2 (two) times daily as needed (MUSCLE SPASMS).   1/22/2019    cefdinir (OMNICEF) 300 MG capsule TK ONE C PO  BID FOR 7 DAYS  0 1/22/2019    cephALEXin (KEFLEX) 750 MG capsule TK ONE C PO TID FOR 5 DAYS  0 1/22/2019    ciprofloxacin HCl (CIPRO) 250 MG tablet Take 1 tablet (250 mg total) by mouth every 12 (twelve) hours. Starting 12/15 evening 5 tablet 0 1/22/2019    clopidogrel (PLAVIX) 75 mg tablet Take 75 mg by mouth once daily.   1/22/2019    diclofenac sodium (VOLTAREN) 1 % Gel Apply 2 g topically daily as needed (PAIN).   1/22/2019    diltiaZEM (CARDIZEM CD) 180 MG 24 hr capsule Take 180 mg by mouth once daily.   1/22/2019    DULoxetine (CYMBALTA) 60 MG capsule Take 60 mg by mouth once daily.   1/22/2019    fluticasone-vilanterol (BREO ELLIPTA) 100-25 mcg/dose diskus inhaler Inhale 1 puff into the lungs once daily. Controller   1/22/2019    gabapentin (NEURONTIN) 300 MG capsule Take 300 mg by mouth once  daily.   1/22/2019    Lactobacillus rhamnosus-fiber 2.5 billion cell-3.5 gram PwPk Take 1 capsule by mouth.   1/22/2019    lansoprazole (PREVACID) 30 MG capsule Take 30 mg by mouth once daily.   1/22/2019    losartan (COZAAR) 100 MG tablet Take 100 mg by mouth once daily.    1/22/2019    ondansetron (ZOFRAN) 4 MG tablet Take 1 tablet (4 mg total) by mouth every 6 (six) hours as needed. 30 tablet 1 1/22/2019    ondansetron (ZOFRAN) 4 MG tablet Take 4-8 mg by mouth.   1/22/2019    potassium chloride SA (K-DUR,KLOR-CON) 10 MEQ tablet Take 10 mEq by mouth.   1/22/2019    predniSONE (DELTASONE) 10 MG tablet Take 4 tablets (40 mg) on 12/16, then take 1 tablet (10 mg) daily 30 tablet 0 1/22/2019    pyridoxine, vitamin B6, (VITAMIN B-6) 50 MG Tab Take 1 tablet (50 mg total) by mouth once daily.  0 1/22/2019    simvastatin (ZOCOR) 40 MG tablet Take 40 mg by mouth.   1/22/2019    theophylline (THEODUR) 300 mg 24 hr capsule Take 300 mg by mouth once daily.   1/22/2019    traMADol (ULTRAM) 50 mg tablet TK 1 T PO BID PRN  0 1/22/2019    [DISCONTINUED] baclofen (LIORESAL) 10 MG tablet Take 10 mg by mouth.   1/22/2019    [DISCONTINUED] furosemide (LASIX) 40 MG tablet Take 40 mg by mouth once daily.    1/22/2019    [DISCONTINUED] HYDROcodone-acetaminophen (NORCO) 5-325 mg per tablet Take 1 tablet by mouth every 4 to 6 hours as needed. 42 tablet 0 1/22/2019    [DISCONTINUED] prazosin (MINIPRESS) 5 MG capsule TK 1 C PO BID  13 1/22/2019       Review of Systems   Unable to perform ROS: Mental status change   Constitutional: Negative for fever.   Neurological: Positive for facial asymmetry, speech difficulty and weakness.     Objective:     Vital Signs (Most Recent):  Temp: 99.9 °F (37.7 °C) (02/18/19 0800)  Pulse: 91 (02/18/19 1308)  Resp: 20 (02/18/19 1308)  BP: (!) 110/56 (02/18/19 0800)  SpO2: (!) 94 % (02/18/19 1308)    Vital Signs Range (Last 24H):  Temp:  [97.4 °F (36.3 °C)-99.9 °F (37.7 °C)]   Pulse:  []    Resp:  [16-22]   BP: (100-128)/(54-62)   SpO2:  [94 %-100 %]     Physical Exam   Constitutional: No distress.   Eyes: EOM are normal. Pupils are equal, round, and reactive to light.   Cardiovascular: Normal rate.   Pulmonary/Chest: Effort normal.   Nursing note and vitals reviewed.      Neurological Exam:   LOC: drowsy and arousable  Articulation: Dysarthria  Orientation: Not oriented to person, Not oriented to place, Not oriented to time  Facial Movement (CN VII): Lower facial weakness on the Left  Motor: Arm left  Paresis: 4/5  Leg left  Paresis: 3/5  Arm right  Paresis: 3/5  Leg right Paresis: 3/5  Moves the left side spontaneously. Right side antigravity.      Laboratory:  CMP:   Recent Labs   Lab 02/18/19  1208   CALCIUM 9.9   ALBUMIN 1.4*   PROT 5.6*      K 3.8   CO2 39*      BUN 9   CREATININE 0.8   ALKPHOS 1,657*   ALT 28   *   BILITOT 3.7*     CBC:   Recent Labs   Lab 02/18/19  0410   WBC 12.17   RBC 2.95*   HGB 8.1*   HCT 29.4*   PLT 85*   *   MCH 27.5   MCHC 27.6*     Lipid Panel: No results for input(s): CHOL, LDLCALC, HDL, TRIG in the last 168 hours.  Coagulation:   Recent Labs   Lab 02/18/19  1208   INR 1.2   APTT 32.3*     Hgb A1C: No results for input(s): HGBA1C in the last 168 hours.  TSH: No results for input(s): TSH in the last 168 hours.    Diagnostic Results:    Brain/Vessel imaging:    CTH 2/18/19 -     Remote left thalamic lacunar infarct. No CT evidence of acute intracranial abnormality.    Cardiac Evaluation:     ECHO 1/26/19 -     · Normal left ventricular systolic function. The estimated ejection fraction is 60%  · No wall motion abnormalities.  · Normal LV diastolic function.  · Normal right ventricular systolic function.  · Mechanically ventilated; cannot use inferior caval vein diameter to estimate central venous pressure.

## 2019-02-18 NOTE — PROGRESS NOTES
Time Stroke Code initiated: 0652  Stroke team Arrival time: 0655  Stroke Code activation triggers: AMS, lethargy,  Vascular Neurologist you spoke with: Laura Manrique NP At bedside    Arrived at CT: 0715  CT completed: 0725    [place] positive or negative: negative    tPA decision:  tPA bolus (mg and time):  tPA infusion (mg and time):  tPA end time:    IR decision:  IR arrival:  IR end time:     Pt transferred to: back to room 1007   Report given to: Jeronimo Herron

## 2019-02-18 NOTE — NURSING
Re-started Glucerna 1.5 @ 30 mL/hr at 1700 per MD verbal order. Ritchie draining clear, yellow urine. Port flushing and drawing back  blood. New green heel boots ordered and applied to feet. No c/o of nausea. Pt has yankeur in hand and call light within reach. HOB raised. Will tell night RN to monitor closely for aspiration precautions Temp 99.8 at last check. MD aware. Will monitor for s/sx of infection r/t possible previous aspiration from large emesis incident from this AM.

## 2019-02-18 NOTE — ASSESSMENT & PLAN NOTE
-PT/OT; history of fall with non displaced oblique right tibia fracture at metaphysis into diaphysis. Was wearing brace.  -Severely debilited; needs placement. SW consulted and following

## 2019-02-18 NOTE — PLAN OF CARE
Problem: Adult Inpatient Plan of Care  Goal: Plan of Care Review  Outcome: Ongoing (interventions implemented as appropriate)  Pt was moaning throughout the night and was medicated with dilaudid and oxy interchangeably for pain management she was also turned and repositioned which made her cry out more . The TF residual continues to be more than 30 ml when checked forcing the TF to be held for several hours at a time until absorbed the nieves remains in place putting out dark yellow urine

## 2019-02-18 NOTE — ASSESSMENT & PLAN NOTE
-AST/ALT now downtrending  -Tbili 3.6 (now uptrending-was 3.1)  -Alk phos downtrending  -CT showed biliary sludge

## 2019-02-18 NOTE — PROGRESS NOTES
"[facility]  General Surgery  Progress Note    Subjective:     History of Present Illness:  62 yo W with a history of HTN, COPD on home oxygen, HFpEF, IDDMII, CVA on plavix, HTN, debility after fall and broken hip, and PE who presents to the ED with a several month history of nausea, vomiting, inability to tolerate a diet and weight loss. She has had multiple admissions in the past few months for different ailments. She presents today with continued nausea, vomiting and abdominal pain that is mostly worse in the RLQ. During the examination, she states her pain was all over her abdomen because she was retching so much. She states that she has lost close to 40lbs since her surgery and that she only able to keep broth down. She had a lap converted to open appendectomy back in August 2018 that was complicated by a wound infection, C diff and failure to thrive. This seems to persists. She is now wheelchair bound (per her) and apparently lives in [place] but is here in [place] with her daughter.    She had a CT scan in the ED which revealed an irregular shaped rim enhancing fluid collection measuring at least 4.0 x 3.0 cm ight hemipelvis at the site of prior appendectomy. Surgery was consulted for further recommendations. She was also noted to have a "knot" at the RLQ incision site that is also tender.    Post-Op Info:  Procedure(s) (LRB):  LAPAROTOMY, EXPLORATORY (N/A)  INCISION AND DRAINAGE, ABSCESS-  drainage of pelvic abscess (N/A)  EXCISION, ABSCESS- drainage abdominal wall abscess (N/A)  APPLICATION, WOUND VAC (N/A)   24 Days Post-Op     Interval History:  Found moaning in bed this am, unresponsive to questioning. Not following commands to upper/lower extremities. Only squeezing left hand. Eyes deviated downwards and to the right. Was conversational when last assessed by team yesterday. Stroke code called. Down to CT scanner. Will assess with full set of labs. Sinus tachycardia noted on EKG, " glucose 133 on poct lab, lactate 1.4, afebrile with stable VS.     Medications:  Continuous Infusions:  Scheduled Meds:   alteplase  2 mg Intra-Catheter Once    aspirin  81 mg Oral Daily    ciprofloxacin HCl  500 mg Oral Q12H    clopidogrel  75 mg Oral Daily    diltiaZEM  180 mg Oral Daily    DULoxetine  60 mg Oral Daily    fluticasone-vilanterol  1 puff Inhalation Daily    furosemide  40 mg Oral BID    glucagon (human recombinant)  1 mg Intramuscular Once    magnesium sulfate IVPB  2 g Intravenous Once    metroNIDAZOLE  500 mg Oral Q8H    miconazole nitrate 2%   Topical (Top) BID    pantoprozole (PROTONIX) IV  40 mg Intravenous Q12H    senna-docusate 8.6-50 mg  1 tablet Per G Tube BID    sodium chloride 0.9%  10 mL Intravenous Q6H     PRN Meds:albuterol sulfate, albuterol-ipratropium, dextrose 50%, diclofenac sodium, diphenhydrAMINE, glucagon (human recombinant), hydrALAZINE, hydrocodone-apap 7.5-325 MG/15 ML, hydrocodone-apap 7.5-325 MG/15 ML, HYDROmorphone, insulin aspart U-100, ondansetron, promethazine (PHENERGAN) IVPB, Flushing PICC Protocol **AND** sodium chloride 0.9% **AND** sodium chloride 0.9%, sodium chloride 0.9%, sodium chloride 0.9%     Review of patient's allergies indicates:   Allergen Reactions    Ace inhibitors Swelling    Carvedilol      Other reaction(s): Other (See Comments)  blister    Hydralazine analogues     Metformin Nausea And Vomiting    Tetracycline Itching    Tetracyclines Swelling    Travatan (with benzalkonium) [travoprost (benzalkonium)]     Travoprost Itching     Other reaction(s): Other (See Comments)  Blurry vision     Objective:     Vital Signs (Most Recent):  Temp: 99.1 °F (37.3 °C) (02/18/19 0445)  Pulse: 107 (02/18/19 0445)  Resp: 18 (02/18/19 0445)  BP: 105/62 (02/18/19 0445)  SpO2: 97 % (02/18/19 0445) Vital Signs (24h Range):  Temp:  [97.4 °F (36.3 °C)-99.8 °F (37.7 °C)] 99.1 °F (37.3 °C)  Pulse:  [] 107  Resp:  [16-22] 18  SpO2:  [91 %-100 %]  97 %  BP: (100-132)/(54-62) 105/62     Weight: 75.4 kg (166 lb 3.6 oz)  Body mass index is 31.41 kg/m².    Intake/Output - Last 3 Shifts       02/16 0700 - 02/17 0659 02/17 0700 - 02/18 0659 02/18 0700 - 02/19 0659    P.O.  0     I.V. (mL/kg) 0 (0) 10 (0.1)     NG/ 1092     Total Intake(mL/kg) 316 (4.2) 1102 (14.6)     Urine (mL/kg/hr) 0 (0) 900 (0.5)     Drains 35 200     Stool 0 0     Total Output 35 1100     Net +281 +2            Urine Occurrence 3 x 9 x     Stool Occurrence 5 x 2 x           Physical Exam   Constitutional: She appears well-developed.   Neck: Normal range of motion. Neck supple.   Cardiovascular: Normal rate and regular rhythm.   Pulmonary/Chest: Effort normal and breath sounds normal.   Abdominal: Soft. She exhibits no distension. There is tenderness (diffusely tender, moaning to palpation).       Musculoskeletal: She exhibits no edema.   Neurological:   Moaning, unresponsive to commands, eyes deviated to right but reactive. Only squeezing left hand.   Skin: Skin is warm and dry.   Breakdown noted on backside and anterior abdominal wall       Significant Labs:  CBC:   Recent Labs   Lab 02/17/19  1641 02/18/19  0410   WBC 11.06 12.17   RBC 3.19* 2.95*   HGB 9.0* 8.1*   HCT 31.0* 29.4*   *  --    MCV 97 100*   MCH 28.2 27.5   MCHC 29.0* 27.6*     CMP:   Recent Labs   Lab 02/18/19  0410      CALCIUM 9.9   ALBUMIN 1.3*   PROT 5.4*   *   K 3.9   CO2 39*      BUN 9   CREATININE 0.8   ALKPHOS 1,568*   ALT 28   *   BILITOT 3.6*       Significant Diagnostics:  None    Assessment/Plan:     * Pelvic abscess in female    -S/p ex-lap with drainage of abscess. On antibiotics.  -Vanc completed.   -On antibiotics; date of completion estimated 2/26/19     Urinary incontinence without sensory awareness    purewick     Elevated liver enzymes    -AST/ALT now downtrending  -Tbili 3.6 (now uptrending-was 3.1)  -Alk phos downtrending  -CT showed biliary sludge     Acute blood  loss anemia    -H/H drop this am from 9/27.4 to 8.1/29.4; continue to monitor  -Last transfused 2/11/19     Dysarthria and anarthria    -PEG placed 2/11/19     Multiple skin tears    -Wound care following     Depression    -Continue Cymbalta     Hypophosphatemia    -Replace as needed     Debility    -PT/OT; history of fall with non displaced oblique right tibia fracture at metaphysis into diaphysis. Was wearing brace.  -Severely debilited; needs placement. SW consulted and following       Generalized abdominal pain    62 yo female presenting with abdominal pain, nausea, elevated lactate s/p open drainage pelvic abscess 1/25     -NPO  -Continue tube feeds (previously at goal of 45 cc/hr)  -Changed pain meds to enteral  -Pain/nasuea meds PRN  -Continue broad spec abx - PO cipro, flagyl - estimated end date 2/26/19  -WOCN following, appreciate help  -DVT/GI prophylaxis  -PT/OT  -diffuse abdominal pain this am; lactate of 1.4     Severe malnutrition    -TPN  -Continue tube feeds (were at goal of 45 cc/hr)     Essential hypertension    -diltiazem, PO lasix     Gastroesophageal reflux disease without esophagitis    -Continue BID protonix     (HFpEF) heart failure with preserved ejection fraction    Last echo 8/2018 with no WMAs, grade 1DD, EF 60%  -Strict I/O, daily weights  -Continue diuresis     Moderate asthma without complication    -Continue with scheduled duonebs  -On 2L NC breathing comfortably this am     Elevated troponin    -Cardiology consulted. On ASA/plavix at home     CAD (coronary artery disease)    -ASA, plavix     Type 2 diabetes mellitus    -SSI, appreciate endocrine assistance        Acute Neurological Status change  -patient found upon assessment this am moaning in bed, unresponsive to questioning. Only able to squeeze left hand, eyes deviated to left.   Stroke code called.     -CBC, CMP, Mag, Phos, Lactate, POCT glucose drawn; f/u on results  - EKG done; sinus tachycardia noted  - Patient brought to  CT; f/u on results  - Lactate 1.4  - VSS, afebrile        Chyna Ott MD  General Surgery  [facility]

## 2019-02-18 NOTE — SUBJECTIVE & OBJECTIVE
Interval History:  Found moaning in bed this am, unresponsive to questioning. Not following commands to upper/lower extremities. Only squeezing left hand. Eyes deviated downwards and to the right. Was conversational when last assessed by team yesterday. Stroke code called. Down to CT scanner. Will assess with full set of labs. Sinus tachycardia noted on EKG, glucose 133 on poct lab, lactate 1.4, afebrile with stable VS.     Medications:  Continuous Infusions:  Scheduled Meds:   alteplase  2 mg Intra-Catheter Once    aspirin  81 mg Oral Daily    ciprofloxacin HCl  500 mg Oral Q12H    clopidogrel  75 mg Oral Daily    diltiaZEM  180 mg Oral Daily    DULoxetine  60 mg Oral Daily    fluticasone-vilanterol  1 puff Inhalation Daily    furosemide  40 mg Oral BID    glucagon (human recombinant)  1 mg Intramuscular Once    magnesium sulfate IVPB  2 g Intravenous Once    metroNIDAZOLE  500 mg Oral Q8H    miconazole nitrate 2%   Topical (Top) BID    pantoprozole (PROTONIX) IV  40 mg Intravenous Q12H    senna-docusate 8.6-50 mg  1 tablet Per G Tube BID    sodium chloride 0.9%  10 mL Intravenous Q6H     PRN Meds:albuterol sulfate, albuterol-ipratropium, dextrose 50%, diclofenac sodium, diphenhydrAMINE, glucagon (human recombinant), hydrALAZINE, hydrocodone-apap 7.5-325 MG/15 ML, hydrocodone-apap 7.5-325 MG/15 ML, HYDROmorphone, insulin aspart U-100, ondansetron, promethazine (PHENERGAN) IVPB, Flushing PICC Protocol **AND** sodium chloride 0.9% **AND** sodium chloride 0.9%, sodium chloride 0.9%, sodium chloride 0.9%     Review of patient's allergies indicates:   Allergen Reactions    Ace inhibitors Swelling    Carvedilol      Other reaction(s): Other (See Comments)  blister    Hydralazine analogues     Metformin Nausea And Vomiting    Tetracycline Itching    Tetracyclines Swelling    Travatan (with benzalkonium) [travoprost (benzalkonium)]     Travoprost Itching     Other reaction(s): Other (See  Comments)  Blurry vision     Objective:     Vital Signs (Most Recent):  Temp: 99.1 °F (37.3 °C) (02/18/19 0445)  Pulse: 107 (02/18/19 0445)  Resp: 18 (02/18/19 0445)  BP: 105/62 (02/18/19 0445)  SpO2: 97 % (02/18/19 0445) Vital Signs (24h Range):  Temp:  [97.4 °F (36.3 °C)-99.8 °F (37.7 °C)] 99.1 °F (37.3 °C)  Pulse:  [] 107  Resp:  [16-22] 18  SpO2:  [91 %-100 %] 97 %  BP: (100-132)/(54-62) 105/62     Weight: 75.4 kg (166 lb 3.6 oz)  Body mass index is 31.41 kg/m².    Intake/Output - Last 3 Shifts       02/16 0700 - 02/17 0659 02/17 0700 - 02/18 0659 02/18 0700 - 02/19 0659    P.O.  0     I.V. (mL/kg) 0 (0) 10 (0.1)     NG/ 1092     Total Intake(mL/kg) 316 (4.2) 1102 (14.6)     Urine (mL/kg/hr) 0 (0) 900 (0.5)     Drains 35 200     Stool 0 0     Total Output 35 1100     Net +281 +2            Urine Occurrence 3 x 9 x     Stool Occurrence 5 x 2 x           Physical Exam   Constitutional: She appears well-developed.   Neck: Normal range of motion. Neck supple.   Cardiovascular: Normal rate and regular rhythm.   Pulmonary/Chest: Effort normal and breath sounds normal.   Abdominal: Soft. She exhibits no distension. There is tenderness (diffusely tender, moaning to palpation).       Musculoskeletal: She exhibits no edema.   Neurological:   Moaning, unresponsive to commands, eyes deviated to right but reactive. Only squeezing left hand.   Skin: Skin is warm and dry.   Breakdown noted on backside and anterior abdominal wall       Significant Labs:  CBC:   Recent Labs   Lab 02/17/19  1641 02/18/19  0410   WBC 11.06 12.17   RBC 3.19* 2.95*   HGB 9.0* 8.1*   HCT 31.0* 29.4*   *  --    MCV 97 100*   MCH 28.2 27.5   MCHC 29.0* 27.6*     CMP:   Recent Labs   Lab 02/18/19  0410      CALCIUM 9.9   ALBUMIN 1.3*   PROT 5.4*   *   K 3.9   CO2 39*      BUN 9   CREATININE 0.8   ALKPHOS 1,568*   ALT 28   *   BILITOT 3.6*       Significant Diagnostics:  None

## 2019-02-18 NOTE — PT/OT/SLP PROGRESS
Pain Management Outpatient Progress Note    DOS: 6/20/2018  [facility] MD: Dr. Jacek Burnham     Chief Complaint   Patient presents with   • Pain     back pain       History: Bill Bliss is a 49 year old male presenting for follow-up evaluation. He is known to the clinic with a history of lumbar postlaminectomy syndrome with chronic bilateral low back pain. Today, he reports pain in a band across the low back. Overall, there has been no significant change in his chronic pain since his last evaluation. He reports increased stress as his wife, who also suffers with chronic pain, was recently diagnosed with a brain aneurysm. He rates his pain at 8/10 at rest and 10/10 with activity. He is prescribed MSIR 15 mg 1-2 tablets 4 times daily as needed, up to 7 tablets daily. He reports taking this medication as prescribed. Pain is adequately controlled with the current pain medication(s). He is able to maintain his current level of functioning with this medication(s). He denies side effects with this medication(s).    Medications:  Current Outpatient Prescriptions   Medication   • morphine, IMM REL, (MSIR) 15 MG tablet   • Naloxegol Oxalate (MOVANTIK) 25 MG Tab   • lidocaine (LIDODERM) 5 %   • Linaclotide (LINZESS) 290 MCG Cap   • SERTRALINE HCL PO   • zolmitriptan (ZOMIG) 5 MG tablet   • SENNA PO   • Rabeprazole Sodium (ACIPHEX) 20 MG OR TBEC   • Topiramate (TOPAMAX) 25 MG OR TABS   • DiphenhydrAMINE HCl (BENADRYL) 25 MG OR TABS   • Vitamin D 2000 UNIT OR TABS   • Multivitamins OR TABS   • Cyclobenzaprine HCl 10 MG OR TABS     No current facility-administered medications for this encounter.         Physical Exam: Bill Bliss is a well-developed, well-nourished male who appears in no acute distress.   Visit Vitals  /80 (BP Location: [facility] [place], Patient Position: Sitting)   Pulse 85   Resp 16   Ht 5' 8\" (1.727 m)   Wt 86.2 kg   SpO2 98%   BMI 28.89 kg/m²   Respiration is non-labored. He appears seated comfortably in the  Physical Therapy  Missed Visit    Patient Name:  Sheryl Martines   MRN:  [id number]  Admitting Diagnosis:  Pelvic abscess in female   Recent Surgery: Procedure(s) (LRB):  LAPAROTOMY, EXPLORATORY (N/A)  INCISION AND DRAINAGE, ABSCESS-  drainage of pelvic abscess (N/A)  EXCISION, ABSCESS- drainage abdominal wall abscess (N/A)  APPLICATION, WOUND VAC (N/A) 24 Days Post-Op    Patient not seen today secondary to Patient fatigue, CT/MRI(on hold today due to stroke code in AM, BiPAP and increased lethargy in pm.). Will follow-up as POC allows.    Ese Harp PT, DPT  2/18/2019  Pager: [phone]           chair, rises to a standing position with modest difficulty, and ambulates with a grossly normal gait. Balance and coordination are intact, speech is fluent. He is alert and oriented x 3 and answers questions appropriately. Mood and affect is appropriate. He does not display any aberrant pain behavior.    Impression: Lumbar postlaminectomy syndrome with chronic bilateral low back pain    Plan: Given the stability in pain control and function, we will continue medications at current dose. Risks, benefits, and side effects of this medication reviewed, and he wishes to continue with the present management. PDMP reviewed; no aberrant behavior identified, prescription authorized. He should follow up in 3 months for re-evaluation. He was encouraged to call or schedule an appointment sooner should any questions or concerns arise.      Randi Moraes PA-C

## 2019-02-18 NOTE — PT/OT/SLP PROGRESS
Occupational Therapy      Patient Name:  Sheryl Martines   MRN:  [id number]    Patient not seen today secondary to Increased agitation, Patient unwilling to participate. Pt very agitated at time of OT attempt; stated MD lied about letting him go home today. Pt refusing to participate in therapy until he speaks with MD. Will follow-up tomorrow.    Chyna Dahl, OT  2/18/2019

## 2019-02-19 ENCOUNTER — PATIENT MESSAGE (OUTPATIENT)
Dept: SURGERY | Facility: CLINIC | Age: 64
End: 2019-02-19

## 2019-02-19 PROBLEM — R40.0 SOMNOLENCE: Status: ACTIVE | Noted: 2019-02-19

## 2019-02-19 PROBLEM — N73.9 PELVIC ABSCESS IN FEMALE: Status: RESOLVED | Noted: 2019-01-25 | Resolved: 2019-02-19

## 2019-02-19 LAB
ALBUMIN SERPL BCP-MCNC: 1.4 G/DL
ALLENS TEST: ABNORMAL
ALP SERPL-CCNC: 1511 U/L
ALP SERPL-CCNC: 1553 U/L
ALP SERPL-CCNC: 1648 U/L
ALT SERPL W/O P-5'-P-CCNC: 23 U/L
ALT SERPL W/O P-5'-P-CCNC: 24 U/L
ALT SERPL W/O P-5'-P-CCNC: 24 U/L
AMMONIA PLAS-SCNC: 84 UMOL/L
ANION GAP SERPL CALC-SCNC: 4 MMOL/L
ANION GAP SERPL CALC-SCNC: 4 MMOL/L
ANION GAP SERPL CALC-SCNC: 5 MMOL/L
ANISOCYTOSIS BLD QL SMEAR: SLIGHT
AST SERPL-CCNC: 93 U/L
AST SERPL-CCNC: 93 U/L
AST SERPL-CCNC: 95 U/L
BACTERIA #/AREA URNS AUTO: ABNORMAL /HPF
BASOPHILS # BLD AUTO: 0.07 K/UL
BASOPHILS # BLD AUTO: 0.08 K/UL
BASOPHILS # BLD AUTO: 0.09 K/UL
BASOPHILS NFR BLD: 0.5 %
BASOPHILS NFR BLD: 0.7 %
BASOPHILS NFR BLD: 0.8 %
BILIRUB SERPL-MCNC: 3.9 MG/DL
BILIRUB SERPL-MCNC: 4 MG/DL
BILIRUB SERPL-MCNC: 4.1 MG/DL
BILIRUB UR QL STRIP: ABNORMAL
BUN SERPL-MCNC: 8 MG/DL
BUN SERPL-MCNC: 9 MG/DL
BUN SERPL-MCNC: 9 MG/DL
CA-I BLDV-SCNC: 1.26 MMOL/L
CALCIUM SERPL-MCNC: 10 MG/DL
CALCIUM SERPL-MCNC: 10.1 MG/DL
CALCIUM SERPL-MCNC: 10.2 MG/DL
CHLORIDE SERPL-SCNC: 101 MMOL/L
CHLORIDE SERPL-SCNC: 102 MMOL/L
CHLORIDE SERPL-SCNC: 105 MMOL/L
CLARITY UR REFRACT.AUTO: ABNORMAL
CO2 SERPL-SCNC: 36 MMOL/L
CO2 SERPL-SCNC: 39 MMOL/L
CO2 SERPL-SCNC: 40 MMOL/L
COLOR UR AUTO: ABNORMAL
CREAT SERPL-MCNC: 0.8 MG/DL
DELSYS: ABNORMAL
DIFFERENTIAL METHOD: ABNORMAL
EOSINOPHIL # BLD AUTO: 1 K/UL
EOSINOPHIL # BLD AUTO: 1 K/UL
EOSINOPHIL # BLD AUTO: 1.1 K/UL
EOSINOPHIL NFR BLD: 7 %
EOSINOPHIL NFR BLD: 9.3 %
EOSINOPHIL NFR BLD: 9.9 %
EP: 8
ERYTHROCYTE [DISTWIDTH] IN BLOOD BY AUTOMATED COUNT: 17.4 %
ERYTHROCYTE [DISTWIDTH] IN BLOOD BY AUTOMATED COUNT: 17.4 %
ERYTHROCYTE [DISTWIDTH] IN BLOOD BY AUTOMATED COUNT: 17.5 %
ERYTHROCYTE [SEDIMENTATION RATE] IN BLOOD BY WESTERGREN METHOD: 15 MM/H
EST. GFR  (AFRICAN AMERICAN): >60 ML/MIN/1.73 M^2
EST. GFR  (NON AFRICAN AMERICAN): >60 ML/MIN/1.73 M^2
FIO2: 30
GGT SERPL-CCNC: 1659 U/L
GLUCOSE SERPL-MCNC: 124 MG/DL
GLUCOSE SERPL-MCNC: 130 MG/DL
GLUCOSE SERPL-MCNC: 162 MG/DL
GLUCOSE UR QL STRIP: NEGATIVE
HCO3 UR-SCNC: 36.7 MMOL/L (ref 24–28)
HCO3 UR-SCNC: 36.9 MMOL/L (ref 24–28)
HCO3 UR-SCNC: 37.8 MMOL/L (ref 24–28)
HCT VFR BLD AUTO: 27 %
HCT VFR BLD AUTO: 27.4 %
HCT VFR BLD AUTO: 27.7 %
HGB BLD-MCNC: 7.8 G/DL
HGB BLD-MCNC: 8.1 G/DL
HGB BLD-MCNC: 8.2 G/DL
HGB UR QL STRIP: ABNORMAL
HYALINE CASTS UR QL AUTO: 10 /LPF
HYPOCHROMIA BLD QL SMEAR: ABNORMAL
IMM GRANULOCYTES # BLD AUTO: 0.06 K/UL
IMM GRANULOCYTES # BLD AUTO: 0.07 K/UL
IMM GRANULOCYTES # BLD AUTO: 0.09 K/UL
IMM GRANULOCYTES NFR BLD AUTO: 0.5 %
IMM GRANULOCYTES NFR BLD AUTO: 0.6 %
IMM GRANULOCYTES NFR BLD AUTO: 0.6 %
INR PPP: 1.1
IP: 15
KETONES UR QL STRIP: NEGATIVE
LACTATE SERPL-SCNC: 2.4 MMOL/L
LACTATE SERPL-SCNC: 2.6 MMOL/L
LACTATE SERPL-SCNC: 3 MMOL/L
LEUKOCYTE ESTERASE UR QL STRIP: ABNORMAL
LYMPHOCYTES # BLD AUTO: 3 K/UL
LYMPHOCYTES # BLD AUTO: 3.3 K/UL
LYMPHOCYTES # BLD AUTO: 3.9 K/UL
LYMPHOCYTES NFR BLD: 26.8 %
LYMPHOCYTES NFR BLD: 27.4 %
LYMPHOCYTES NFR BLD: 29.3 %
MAGNESIUM SERPL-MCNC: 1.8 MG/DL
MAGNESIUM SERPL-MCNC: 2 MG/DL
MCH RBC QN AUTO: 27 PG
MCH RBC QN AUTO: 28.1 PG
MCH RBC QN AUTO: 28.4 PG
MCHC RBC AUTO-ENTMCNC: 28.5 G/DL
MCHC RBC AUTO-ENTMCNC: 29.2 G/DL
MCHC RBC AUTO-ENTMCNC: 30.4 G/DL
MCV RBC AUTO: 93 FL
MCV RBC AUTO: 95 FL
MCV RBC AUTO: 96 FL
MICROSCOPIC COMMENT: ABNORMAL
MODE: ABNORMAL
MONOCYTES # BLD AUTO: 1.3 K/UL
MONOCYTES # BLD AUTO: 1.3 K/UL
MONOCYTES # BLD AUTO: 1.8 K/UL
MONOCYTES NFR BLD: 11.7 %
MONOCYTES NFR BLD: 11.8 %
MONOCYTES NFR BLD: 12.7 %
NEUTROPHILS # BLD AUTO: 5.4 K/UL
NEUTROPHILS # BLD AUTO: 5.7 K/UL
NEUTROPHILS # BLD AUTO: 7.3 K/UL
NEUTROPHILS NFR BLD: 47.9 %
NEUTROPHILS NFR BLD: 50.7 %
NEUTROPHILS NFR BLD: 51.8 %
NITRITE UR QL STRIP: NEGATIVE
NRBC BLD-RTO: 0 /100 WBC
OVALOCYTES BLD QL SMEAR: ABNORMAL
PCO2 BLDA: 46.2 MMHG (ref 35–45)
PCO2 BLDA: 52.4 MMHG (ref 35–45)
PCO2 BLDA: 60.1 MMHG (ref 35–45)
PH SMN: 7.41 [PH] (ref 7.35–7.45)
PH SMN: 7.45 [PH] (ref 7.35–7.45)
PH SMN: 7.51 [PH] (ref 7.35–7.45)
PH UR STRIP: 5 [PH] (ref 5–8)
PHOSPHATE SERPL-MCNC: 2.3 MG/DL
PHOSPHATE SERPL-MCNC: 2.8 MG/DL
PLATELET # BLD AUTO: 110 K/UL
PLATELET # BLD AUTO: 119 K/UL
PLATELET # BLD AUTO: 146 K/UL
PMV BLD AUTO: 12 FL
PMV BLD AUTO: 12.3 FL
PMV BLD AUTO: 12.3 FL
PO2 BLDA: 66 MMHG (ref 80–100)
PO2 BLDA: 66 MMHG (ref 80–100)
PO2 BLDA: 69 MMHG (ref 80–100)
POC BE: 13 MMOL/L
POC BE: 13 MMOL/L
POC BE: 14 MMOL/L
POC SATURATED O2: 92 % (ref 95–100)
POC SATURATED O2: 93 % (ref 95–100)
POC SATURATED O2: 95 % (ref 95–100)
POC TCO2: 38 MMOL/L (ref 23–27)
POC TCO2: 38 MMOL/L (ref 23–27)
POC TCO2: 40 MMOL/L (ref 23–27)
POCT GLUCOSE: 137 MG/DL (ref 70–110)
POCT GLUCOSE: 150 MG/DL (ref 70–110)
POCT GLUCOSE: 168 MG/DL (ref 70–110)
POCT GLUCOSE: 182 MG/DL (ref 70–110)
POCT GLUCOSE: 185 MG/DL (ref 70–110)
POCT GLUCOSE: 190 MG/DL (ref 70–110)
POCT GLUCOSE: 220 MG/DL (ref 70–110)
POIKILOCYTOSIS BLD QL SMEAR: SLIGHT
POLYCHROMASIA BLD QL SMEAR: ABNORMAL
POTASSIUM SERPL-SCNC: 3.5 MMOL/L
POTASSIUM SERPL-SCNC: 3.6 MMOL/L
POTASSIUM SERPL-SCNC: 4 MMOL/L
POTASSIUM SERPL-SCNC: 4.1 MMOL/L
PROT SERPL-MCNC: 5.4 G/DL
PROT SERPL-MCNC: 5.5 G/DL
PROT SERPL-MCNC: 5.6 G/DL
PROT UR QL STRIP: ABNORMAL
PROTHROMBIN TIME: 11.6 SEC
RBC # BLD AUTO: 2.88 M/UL
RBC # BLD AUTO: 2.89 M/UL
RBC # BLD AUTO: 2.89 M/UL
RBC #/AREA URNS AUTO: 8 /HPF (ref 0–4)
SAMPLE: ABNORMAL
SITE: ABNORMAL
SODIUM SERPL-SCNC: 144 MMOL/L
SODIUM SERPL-SCNC: 146 MMOL/L
SODIUM SERPL-SCNC: 146 MMOL/L
SP GR UR STRIP: >=1.03 (ref 1–1.03)
SP02: 94
SP02: 94
SPONT RATE: 6
SQUAMOUS #/AREA URNS AUTO: 3 /HPF
T4 FREE SERPL-MCNC: 0.7 NG/DL
TARGETS BLD QL SMEAR: ABNORMAL
TSH SERPL DL<=0.005 MIU/L-ACNC: 4.72 UIU/ML
URN SPEC COLLECT METH UR: ABNORMAL
WBC # BLD AUTO: 11.24 K/UL
WBC # BLD AUTO: 11.28 K/UL
WBC # BLD AUTO: 14.06 K/UL
WBC #/AREA URNS AUTO: 39 /HPF (ref 0–5)

## 2019-02-19 PROCEDURE — 82330 ASSAY OF CALCIUM: CPT

## 2019-02-19 PROCEDURE — 27000221 HC OXYGEN, UP TO 24 HOURS

## 2019-02-19 PROCEDURE — 63600175 PHARM REV CODE 636 W HCPCS: Performed by: NURSE PRACTITIONER

## 2019-02-19 PROCEDURE — 25000003 PHARM REV CODE 250: Performed by: PHYSICIAN ASSISTANT

## 2019-02-19 PROCEDURE — 80053 COMPREHEN METABOLIC PANEL: CPT | Mod: 91

## 2019-02-19 PROCEDURE — 25000003 PHARM REV CODE 250: Performed by: STUDENT IN AN ORGANIZED HEALTH CARE EDUCATION/TRAINING PROGRAM

## 2019-02-19 PROCEDURE — 25000003 PHARM REV CODE 250: Performed by: SURGERY

## 2019-02-19 PROCEDURE — 83735 ASSAY OF MAGNESIUM: CPT

## 2019-02-19 PROCEDURE — 82803 BLOOD GASES ANY COMBINATION: CPT

## 2019-02-19 PROCEDURE — 25000242 PHARM REV CODE 250 ALT 637 W/ HCPCS: Performed by: STUDENT IN AN ORGANIZED HEALTH CARE EDUCATION/TRAINING PROGRAM

## 2019-02-19 PROCEDURE — 63600175 PHARM REV CODE 636 W HCPCS: Performed by: STUDENT IN AN ORGANIZED HEALTH CARE EDUCATION/TRAINING PROGRAM

## 2019-02-19 PROCEDURE — 27100171 HC OXYGEN HIGH FLOW UP TO 24 HOURS

## 2019-02-19 PROCEDURE — 99900035 HC TECH TIME PER 15 MIN (STAT)

## 2019-02-19 PROCEDURE — 84132 ASSAY OF SERUM POTASSIUM: CPT

## 2019-02-19 PROCEDURE — A4216 STERILE WATER/SALINE, 10 ML: HCPCS | Performed by: SURGERY

## 2019-02-19 PROCEDURE — 87040 BLOOD CULTURE FOR BACTERIA: CPT | Mod: 59

## 2019-02-19 PROCEDURE — 94761 N-INVAS EAR/PLS OXIMETRY MLT: CPT

## 2019-02-19 PROCEDURE — C9113 INJ PANTOPRAZOLE SODIUM, VIA: HCPCS | Performed by: PHYSICIAN ASSISTANT

## 2019-02-19 PROCEDURE — 82164 ANGIOTENSIN I ENZYME TEST: CPT

## 2019-02-19 PROCEDURE — 87086 URINE CULTURE/COLONY COUNT: CPT

## 2019-02-19 PROCEDURE — 36600 WITHDRAWAL OF ARTERIAL BLOOD: CPT

## 2019-02-19 PROCEDURE — 84100 ASSAY OF PHOSPHORUS: CPT | Mod: 91

## 2019-02-19 PROCEDURE — 85025 COMPLETE CBC W/AUTO DIFF WBC: CPT

## 2019-02-19 PROCEDURE — 82140 ASSAY OF AMMONIA: CPT

## 2019-02-19 PROCEDURE — 84439 ASSAY OF FREE THYROXINE: CPT

## 2019-02-19 PROCEDURE — 83605 ASSAY OF LACTIC ACID: CPT | Mod: 91

## 2019-02-19 PROCEDURE — 63600175 PHARM REV CODE 636 W HCPCS: Performed by: PHYSICIAN ASSISTANT

## 2019-02-19 PROCEDURE — 94640 AIRWAY INHALATION TREATMENT: CPT

## 2019-02-19 PROCEDURE — 81001 URINALYSIS AUTO W/SCOPE: CPT

## 2019-02-19 PROCEDURE — 83605 ASSAY OF LACTIC ACID: CPT

## 2019-02-19 PROCEDURE — 99231 SBSQ HOSP IP/OBS SF/LOW 25: CPT | Mod: ,,, | Performed by: NURSE PRACTITIONER

## 2019-02-19 PROCEDURE — 94660 CPAP INITIATION&MGMT: CPT

## 2019-02-19 PROCEDURE — 97112 NEUROMUSCULAR REEDUCATION: CPT

## 2019-02-19 PROCEDURE — 85610 PROTHROMBIN TIME: CPT

## 2019-02-19 PROCEDURE — 36415 COLL VENOUS BLD VENIPUNCTURE: CPT

## 2019-02-19 PROCEDURE — 84100 ASSAY OF PHOSPHORUS: CPT

## 2019-02-19 PROCEDURE — 86235 NUCLEAR ANTIGEN ANTIBODY: CPT

## 2019-02-19 PROCEDURE — 87088 URINE BACTERIA CULTURE: CPT

## 2019-02-19 PROCEDURE — 20000000 HC ICU ROOM

## 2019-02-19 PROCEDURE — 80053 COMPREHEN METABOLIC PANEL: CPT

## 2019-02-19 PROCEDURE — 99231 PR SUBSEQUENT HOSPITAL CARE,LEVL I: ICD-10-PCS | Mod: ,,, | Performed by: NURSE PRACTITIONER

## 2019-02-19 PROCEDURE — 97110 THERAPEUTIC EXERCISES: CPT

## 2019-02-19 PROCEDURE — 82977 ASSAY OF GGT: CPT

## 2019-02-19 PROCEDURE — 87106 FUNGI IDENTIFICATION YEAST: CPT

## 2019-02-19 PROCEDURE — 83735 ASSAY OF MAGNESIUM: CPT | Mod: 91

## 2019-02-19 PROCEDURE — 84443 ASSAY THYROID STIM HORMONE: CPT

## 2019-02-19 RX ORDER — POTASSIUM CHLORIDE 20 MEQ/15ML
40 SOLUTION ORAL ONCE
Status: COMPLETED | OUTPATIENT
Start: 2019-02-19 | End: 2019-02-19

## 2019-02-19 RX ORDER — PREDNISONE 5 MG/ML
10 SOLUTION ORAL DAILY
Status: DISCONTINUED | OUTPATIENT
Start: 2019-02-20 | End: 2019-02-20

## 2019-02-19 RX ORDER — VANCOMYCIN HCL IN 5 % DEXTROSE 1.25 G/25
15 PLASTIC BAG, INJECTION (ML) INTRAVENOUS
Status: DISCONTINUED | OUTPATIENT
Start: 2019-02-19 | End: 2019-02-21

## 2019-02-19 RX ORDER — POTASSIUM CHLORIDE 20 MEQ/1
40 TABLET, EXTENDED RELEASE ORAL ONCE
Status: DISCONTINUED | OUTPATIENT
Start: 2019-02-19 | End: 2019-02-19

## 2019-02-19 RX ORDER — FUROSEMIDE 40 MG/1
40 TABLET ORAL 2 TIMES DAILY
Status: DISCONTINUED | OUTPATIENT
Start: 2019-02-19 | End: 2019-02-19

## 2019-02-19 RX ORDER — PANTOPRAZOLE SODIUM 40 MG/10ML
40 INJECTION, POWDER, LYOPHILIZED, FOR SOLUTION INTRAVENOUS EVERY 24 HOURS
Status: DISCONTINUED | OUTPATIENT
Start: 2019-02-20 | End: 2019-02-25

## 2019-02-19 RX ORDER — INDOMETHACIN 75 MG/1
75 CAPSULE, EXTENDED RELEASE ORAL 2 TIMES DAILY
Status: DISCONTINUED | OUTPATIENT
Start: 2019-02-19 | End: 2019-02-21

## 2019-02-19 RX ORDER — IPRATROPIUM BROMIDE AND ALBUTEROL SULFATE 2.5; .5 MG/3ML; MG/3ML
3 SOLUTION RESPIRATORY (INHALATION)
Status: DISCONTINUED | OUTPATIENT
Start: 2019-02-19 | End: 2019-02-25

## 2019-02-19 RX ORDER — PREDNISONE 5 MG/ML
10 SOLUTION ORAL DAILY
Status: DISCONTINUED | OUTPATIENT
Start: 2019-02-20 | End: 2019-02-19 | Stop reason: SDUPTHER

## 2019-02-19 RX ORDER — MAGNESIUM SULFATE HEPTAHYDRATE 40 MG/ML
2 INJECTION, SOLUTION INTRAVENOUS ONCE
Status: COMPLETED | OUTPATIENT
Start: 2019-02-19 | End: 2019-02-19

## 2019-02-19 RX ORDER — ALBUTEROL SULFATE 2.5 MG/.5ML
2.5 SOLUTION RESPIRATORY (INHALATION) EVERY 4 HOURS
Status: DISCONTINUED | OUTPATIENT
Start: 2019-02-19 | End: 2019-02-19

## 2019-02-19 RX ADMIN — Medication 10 ML: at 11:02

## 2019-02-19 RX ADMIN — DULOXETINE 60 MG: 60 CAPSULE, DELAYED RELEASE ORAL at 09:02

## 2019-02-19 RX ADMIN — FUROSEMIDE 40 MG: 40 TABLET ORAL at 09:02

## 2019-02-19 RX ADMIN — INSULIN ASPART 2 UNITS: 100 INJECTION, SOLUTION INTRAVENOUS; SUBCUTANEOUS at 02:02

## 2019-02-19 RX ADMIN — Medication 10 ML: at 06:02

## 2019-02-19 RX ADMIN — PANTOPRAZOLE SODIUM 40 MG: 40 INJECTION, POWDER, LYOPHILIZED, FOR SOLUTION INTRAVENOUS at 09:02

## 2019-02-19 RX ADMIN — HYDROCODONE BITARTRATE AND ACETAMINOPHEN 15 ML: 7.5; 325 SOLUTION ORAL at 04:02

## 2019-02-19 RX ADMIN — IPRATROPIUM BROMIDE AND ALBUTEROL SULFATE 3 ML: .5; 3 SOLUTION RESPIRATORY (INHALATION) at 08:02

## 2019-02-19 RX ADMIN — HEPARIN SODIUM 5000 UNITS: 5000 INJECTION, SOLUTION INTRAVENOUS; SUBCUTANEOUS at 02:02

## 2019-02-19 RX ADMIN — Medication 10 ML: at 05:02

## 2019-02-19 RX ADMIN — INDOMETHACIN 75 MG: 75 CAPSULE, EXTENDED RELEASE ORAL at 09:02

## 2019-02-19 RX ADMIN — HYDROCODONE BITARTRATE AND ACETAMINOPHEN 15 ML: 7.5; 325 SOLUTION ORAL at 10:02

## 2019-02-19 RX ADMIN — PIPERACILLIN AND TAZOBACTAM 4.5 G: 4; .5 INJECTION, POWDER, LYOPHILIZED, FOR SOLUTION INTRAVENOUS; PARENTERAL at 06:02

## 2019-02-19 RX ADMIN — METRONIDAZOLE 500 MG: 500 TABLET ORAL at 02:02

## 2019-02-19 RX ADMIN — CLOPIDOGREL 75 MG: 75 TABLET, FILM COATED ORAL at 09:02

## 2019-02-19 RX ADMIN — HEPARIN SODIUM 5000 UNITS: 5000 INJECTION, SOLUTION INTRAVENOUS; SUBCUTANEOUS at 09:02

## 2019-02-19 RX ADMIN — POTASSIUM CHLORIDE 40 MEQ: 20 SOLUTION ORAL at 04:02

## 2019-02-19 RX ADMIN — DILTIAZEM HYDROCHLORIDE 180 MG: 180 CAPSULE, COATED, EXTENDED RELEASE ORAL at 09:02

## 2019-02-19 RX ADMIN — MICONAZOLE NITRATE: 20 OINTMENT TOPICAL at 09:02

## 2019-02-19 RX ADMIN — HEPARIN SODIUM 5000 UNITS: 5000 INJECTION, SOLUTION INTRAVENOUS; SUBCUTANEOUS at 06:02

## 2019-02-19 RX ADMIN — ASPIRIN 81 MG CHEWABLE TABLET 81 MG: 81 TABLET CHEWABLE at 09:02

## 2019-02-19 RX ADMIN — SODIUM CHLORIDE, SODIUM LACTATE, POTASSIUM CHLORIDE, AND CALCIUM CHLORIDE 500 ML: .6; .31; .03; .02 INJECTION, SOLUTION INTRAVENOUS at 01:02

## 2019-02-19 RX ADMIN — METRONIDAZOLE 500 MG: 500 TABLET ORAL at 06:02

## 2019-02-19 RX ADMIN — LACTULOSE 20 G: 20 SOLUTION ORAL at 09:02

## 2019-02-19 RX ADMIN — Medication 1250 MG: at 05:02

## 2019-02-19 RX ADMIN — MAGNESIUM SULFATE HEPTAHYDRATE 2 G: 40 INJECTION, SOLUTION INTRAVENOUS at 09:02

## 2019-02-19 RX ADMIN — LACTULOSE 20 G: 20 SOLUTION ORAL at 08:02

## 2019-02-19 RX ADMIN — SODIUM CHLORIDE, SODIUM LACTATE, POTASSIUM CHLORIDE, AND CALCIUM CHLORIDE 500 ML: .6; .31; .03; .02 INJECTION, SOLUTION INTRAVENOUS at 10:02

## 2019-02-19 RX ADMIN — CIPROFLOXACIN HYDROCHLORIDE 500 MG: 500 TABLET, FILM COATED ORAL at 09:02

## 2019-02-19 NOTE — NURSING
Notified Benjamin with Rapid Response of drop in pt bp and elevated temp. Will continue to monitor pt.

## 2019-02-19 NOTE — PLAN OF CARE
Problem: Adult Inpatient Plan of Care  Goal: Plan of Care Review  Outcome: Ongoing (interventions implemented as appropriate)  POC reviewed with patient who needs reinforcement and shows no signs of understanding. Only oriented to self, disoriented to place, time, and situation. Patient solmenent unless being turned and/or changed. Upon movement, patient moans and cries that she hurts. Pain controlled with PRN elixir. Patient having incontinent loose stools, turned and changed frequently throughout shift. Neuro checks completed Q4 which all remained the same throughout the shift, see flow sheets. VSS on BiPAP. Ritchie intact draining concentrated urine. Low urine output, Dr. Ott aware and ordered 500cc LR bolus one time dose. Abdomen remains excoriated with wounds to lower region, RLQ dressing saturated and changed. Midline staples SURESH and intact. Generalized +2/+3 edema noted to all extremities and labia. G-tube in place with TF infusing and rate increased Q6 per orders. Residuals checked and were no more than 30cc. Current rate @ 40cc/hr. HOB kept elevated due to aspiration precautions, kept strict NPO. Telemetry-NSR. Blood glucose checked Q4 with no coverage needed. Heels elevated off bed. DVT prophylaxis maintained with Heparin. Safety precautions maintained, call light within reach. Remains free from falls and injury. No acute events. No distress noted. Will continue to monitor.

## 2019-02-19 NOTE — PLAN OF CARE
Reviewed BG levels and insulin regimen.      Goal BG while inpatient: 140-180 mg/dl  BG at goal without insulin.         Recommendations:   BG monitoring every 4 hours and low  dose correction scale.   POC Qh4      We will follow with you, call with any questions    En Gallagher MD  Endocrine Fellow  2/19/2019

## 2019-02-19 NOTE — SIGNIFICANT EVENT
Rapid Response Nurse Note     Rapid Response Metrics:     Admit Date: 2019  LOS: 26  Code Status: Full Code   Date of Consult: 2019  : 1955  Age: 63 y.o.  Weight:   Wt Readings from Last 1 Encounters:   19 75.4 kg (166 lb 3.6 oz)     Sex: female  Race: Black or    Bed: [address] [place] [address] A:   MRN: [id number]  Time Rapid Response Team page Received: 1435  Time Rapid Response Team at Bedside: 1425  Time Rapid Response Team left Bedside: 1555  Was the patient discharged from an ICU this admission?   yes  Was the patient discharged from a PACU within last 24 hours?  no  Did the patient receive conscious sedation/general anesthesia within last 24 hours?  no  Was the patient in the ED within the past 24 hours?  no  Was the patient started on NIPPV within the past 24 hours?  yes  Did this progress into an ARC or CPA:  no  Attending Physician: Monster Laurent MD  Primary Service: Networked reference to record PCT   Consult Requested By: Monster Laurent MD   Rapid Response Indication(s): AMS    SITUATION:     Reason for Call: AMS  Called to evaluate the patient for Neuro    BACKGROUND:     Why is the patient in the hospital?: Intractable nausea and vomiting  What changed?: Pt became more lethargic as day progressed    ASSESSMENT:     What did you find: Upon arrival to bedside, primary RN and charge RN with patient. Pt minimally responsive to sternal rub. Does not follow commands. PERRLA bilaterally. Bipap mask in place, 30% FiO2, 12/8 upon arrival. Withdraws from painful stimuli. Primary MD called to bedside. Labs sent for evaluation. ABG done PCO2 52.4 and O2 66.     RECOMMENDATIONS:     We recommend: Closer monitoring and more frequent vitals    FOLLOW-UP/CONTINGENCY PLAN:     Patient needs a second visit at : N/A    Call the rapid response Nurse at x [phone] for additional questions or concerns.      PHYSICIAN ESCALATION:     Orders received and case discussed with MD Choudhary      Disposition: Tx in ICU bed 45526.

## 2019-02-19 NOTE — ASSESSMENT & PLAN NOTE
- appreciate vascular neurology assistance  - likely acute encephalopathy related to infectious process

## 2019-02-19 NOTE — SUBJECTIVE & OBJECTIVE
"Interval HPI:   Overnight events: Rapid response called this afternoon, transferred to ICU. Stroke code yesterday morning, still disoriented.  TF infusing overnight, increasing to goal - low residuals noted. BG at or below goal without any correction scale insulin needs.  Eating:   NPO  Nausea: No  Hypoglycemia and intervention: No  Fever: No  TPN and/or TF: Yes  TF and rate: Glucerna 1.5 at 45 cc/hr    BP (!) 105/51 (BP Location: Right arm, Patient Position: Lying)   Pulse 94   Temp 97.6 °F (36.4 °C) (Axillary)   Resp 17   Ht 5' 1" (1.549 m)   Wt 75.4 kg (166 lb 3.6 oz)   LMP  (LMP Unknown)   SpO2 95%   Breastfeeding? No   BMI 31.41 kg/m²     Labs Reviewed and Include    Recent Labs   Lab 02/19/19  0402   *   CALCIUM 10.1   ALBUMIN 1.4*   PROT 5.6*      K 3.5   CO2 39*      BUN 9   CREATININE 0.8   ALKPHOS 1,553*   ALT 24   AST 95*   BILITOT 4.0*     Lab Results   Component Value Date    WBC 11.24 02/19/2019    HGB 8.2 (L) 02/19/2019    HCT 27.0 (L) 02/19/2019    MCV 93 02/19/2019     (L) 02/19/2019     No results for input(s): TSH, FREET4 in the last 168 hours.  Lab Results   Component Value Date    HGBA1C 4.7 01/24/2019       Nutritional status:   Body mass index is 31.41 kg/m².  Lab Results   Component Value Date    ALBUMIN 1.4 (L) 02/19/2019    ALBUMIN 1.4 (L) 02/19/2019    ALBUMIN 1.4 (L) 02/18/2019     Lab Results   Component Value Date    PREALBUMIN 4 (L) 02/18/2019    PREALBUMIN 5 (L) 02/14/2019    PREALBUMIN 6 (L) 02/07/2019       Estimated Creatinine Clearance: 66.8 mL/min (based on SCr of 0.8 mg/dL).    Accu-Checks  Recent Labs     02/17/19  1650 02/17/19 2011 02/18/19  0025 02/18/19  0446 02/18/19  0647 02/18/19  0800 02/18/19  1542 02/18/19  2205 02/19/19  0218 02/19/19  0635   POCTGLUCOSE 115* 118* 124* 120* 133* 107 108 140* 137* 150*       Current Medications and/or Treatments Impacting Glycemic Control  Immunotherapy:    Immunosuppressants     None    "     Steroids:   Hormones (From admission, onward)    None        Pressors:    Autonomic Drugs (From admission, onward)    None        Hyperglycemia/Diabetes Medications:   Antihyperglycemics (From admission, onward)    Start     Stop Route Frequency Ordered    02/17/19 0902  insulin aspart U-100 pen 0-5 Units      -- SubQ Every 4 hours PRN 02/17/19 0803

## 2019-02-19 NOTE — PT/OT/SLP DISCHARGE
Occupational Therapy Discharge Summary    Sheryl Martines  MRN: [id number]   Principal Problem: Pelvic abscess in female      Patient Discharged from acute Occupational Therapy on 2/19/19.  Please refer to prior OT note dated 2/19/19 for functional status.    Assessment:      Patient transferred to lower level of care secondary to decreased responsiveness, lethargy    Objective:     GOALS:   Multidisciplinary Problems     Occupational Therapy Goals     Not on file          Multidisciplinary Problems (Resolved)        Problem: Occupational Therapy Goal    Goal Priority Disciplines Outcome Interventions   Occupational Therapy Goal   (Resolved)     OT, PT/OT Outcome(s) achieved    Description:  Goals to be met by: 2/21/19; revised from 2/12/19     Patient will increase functional independence with ADLs by performing:    Feeding with Set-up Assistance. - MET 2/9  UE Dressing with Set-up Assistance.  LE Dressing with Moderate Assistance.  Grooming while seated with Set-up Assistance.  Toileting from bedside commode with Maximum Assistance for hygiene and clothing management.   Toilet transfer to bedside commode with Maximum Assistance.                        Reasons for Discontinuation of Therapy Services  Transfer to alternate level of care.      Plan:     [facility]  2/19/2019

## 2019-02-19 NOTE — PLAN OF CARE
Problem: Adult Inpatient Plan of Care  Goal: Plan of Care Review  Recommendations    Recommendation/Intervention:     1.Continue advancing TF of Glucerna 1.5 to goal rate of 45 mL/hr to provide 1620 kcal, 89 gm protein, and 820 mL free fluid.               - If bolus desired, recommend TF of Glucerna 1.5 x 4.5 cans/day to provide 1602 kcal, 88 gm protein, and 810 mL free fluid.      2. If able to advance diet, ADAT to Diabetic with texture per SLP.     3. RD following.       Goals: Patient to meet > 85% EEN and EPN  Nutrition Goal Status: goal met

## 2019-02-19 NOTE — PLAN OF CARE
Problem: Occupational Therapy Goal  Goal: Occupational Therapy Goal  Goals to be met by: 2/21/19; revised from 2/12/19     Patient will increase functional independence with ADLs by performing:    Feeding with Set-up Assistance. - MET 2/9  UE Dressing with Set-up Assistance.  LE Dressing with Moderate Assistance.  Grooming while seated with Set-up Assistance.  Toileting from bedside commode with Maximum Assistance for hygiene and clothing management.   Toilet transfer to bedside commode with Maximum Assistance.       Outcome: Outcome(s) achieved Date Met: 02/19/19  Goals not met; pt t/f to ICU     Comments: D/C OT 2/19/2019    Chyna Dahl, OT  2/19/2019

## 2019-02-19 NOTE — ASSESSMENT & PLAN NOTE
Nurses notes (i.e., Visit Notes) reviewed and accepted   This office note has been dictated.  Social History     Social History   • Marital status: Significant other     Spouse name: N/A   • Number of children: N/A   • Years of education: N/A     Occupational History   •       Social History Main Topics   • Smoking status: Never Smoker   • Smokeless tobacco: Never Used   • Alcohol use 0.0 oz/week     0 Standard drinks or equivalent per week      Comment: rarely   • Drug use: No   • Sexual activity: Not Asked     Other Topics Concern   • Caffeine Concern No   • Sleep Concern No   • Stress Concern No   • Weight Concern Yes   • Special Diet No   • Exercise Yes     walk 3-5 miles at work   • Seat Belt Yes     Social History Narrative     Family History   Problem Relation Age of Onset   • Cancer Maternal Grandfather      Lymph - Bone     ALLERGIES:   Allergen Reactions   • Doxycycline HIVES     Current Outpatient Prescriptions   Medication Sig Dispense Refill   • omeprazole (PRILOSEC) 20 MG capsule Take 1 capsule by mouth daily. 30 capsule 0   • loratadine (CLARITIN) 10 MG tablet Take 10 mg by mouth daily.     • Mometasone Furo-Formoterol Fum (DULERA) 100-5 MCG/ACT Aerosol Inhale 2 puffs into the lungs 2 times daily. 1 Inhaler 2   • ibuprofen (MOTRIN) 800 MG tablet Take 1 tablet by mouth 3 times daily as needed for Pain. 15 tablet 0   • albuterol 108 (90 BASE) MCG/ACT inhaler Inhale 2 puffs into the lungs four times daily. 1 Inhaler 1     No current facility-administered medications for this visit.      Past Medical History   Diagnosis Date   • Asthma    • GERD (gastroesophageal reflux disease)    • Pneumonia, bacterial 2016     right middle lobe        REVIEW OF SYSTEMS:  CONSTITUTION: No fatigue, no fever, no weight loss, no change in appetite  HEENT: No double or blurred vision, no hearing difficulties, no trouble swallowing, no sore throat or speech difficulties  RESPIRATORY: No SOB, no  -ASA, plavix   cough  CARDIOVASCULAR: No palpitations, no dizziness, no chest pain  GI: no abdominal pain or distension, no blood or black stools, no nausea or vomiting, no constipation or diarrhea  : No dysuria, no difficulty urinating, no genital sores, no hematuria  MUSCULOSKELETAL: No arthralgias, no back pain, no joint swelling, no myalgias  NEURO: No dizziness, no numbness or tingling, no headaches, no syncope, no localizing weakness  HEME: No easy bruising or bleeding  PSYCH: No confusion, no hallucinations, no dysphoric mood, no anxiety, no sleep disturbances  SKIN: no rash, no color change    PHYSICAL:  CONSTITUTIONAL: No acute distress  HEENT: Pupils equal round and reactive to light, no conjunctival injection or scleral icterus, tympanic membranes clear bilaterally, oropharynx pink/moist, teeth in good repair, lips without cracking or cyanosis  NECK: supple, no thyromegaly, no JVD  LYMPH: No cervical axillary or inguinal adenopathy  LUNGS: Normal respiratory effort, clear to auscultation left, scattered wheeze on right, no  rales, good expansion  HEART: regular rate and rhythm, no murmurs, bruits or gallops  ABDOMEN: Positive bowel sounds, no hepatosplenomegaly, soft, no tenderness, non-distended  EXTREMITIES: No clubbing, cyanosis or edema  PULSES: Carotid, radial, dorsalis, pedalis are 2+ and symmetrical  NEURO: Strength 5/5 in all four extremities, sensation intact to light touch bilaterally, mental status alert and oriented to person, place and time, cranial nerves intact to examination  SKIN: warm and dry, no obvious rash        Please send a copy of this dictation to DR Hart

## 2019-02-19 NOTE — NURSING
Rapid response called for pt due to increased lethargy and unresponsiveness. Pt was unable to open her eyes fully and would not track with her eyes. MD came to bedside to assess. All vital signs are stable. Pt to transfer to [facility], report called to GILBERT Colbert.

## 2019-02-19 NOTE — PROGRESS NOTES
"[facility]  Adult Nutrition  Progress Note    SUMMARY       Recommendations    Recommendation/Intervention:     1.Continue advancing TF of Glucerna 1.5 to goal rate of 45 mL/hr to provide 1620 kcal, 89 gm protein, and 820 mL free fluid.               - If bolus desired, recommend TF of Glucerna 1.5 x 4.5 cans/day to provide 1602 kcal, 88 gm protein, and 810 mL free fluid.      2. If able to advance diet, ADAT to Diabetic with texture per SLP.     3. RD following.       Goals: Patient to meet > 85% EEN and EPN  Nutrition Goal Status: goal met  Communication of RD Recs: (POC)    Reason for Assessment    Reason For Assessment: RD follow-up  Diagnosis: infection/sepsis(pelvic abscess)  Relevant Medical History: COPD, CVA, DM, HTN, HLD  Interdisciplinary Rounds: attended  General Information Comments: S/p ex-lap with drainage of abscess. Pt disoriented. Two RNs at bedside. Tolerating TF @ 40 mL/hr this AM (goal 45 mL/hr). NFPE completed , . Pt continues with mild muscle/fat wasting. Noted moderate edema. Pt with moderate malnutrition.   Nutrition Discharge Planning: unable to determine at this time    Nutrition Risk Screen    Nutrition Risk Screen: large or nonhealing wound, burn or pressure injury    Nutrition/Diet History    Typical Food/Fluid Intake: Per MD note, patient reported poor PO itnake PTA 2/2 abdominal pain and N/V.  Spiritual, Cultural Beliefs, Anglican Practices, Values that Affect Care: no  Factors Affecting Nutritional Intake: NPO    Anthropometrics    Temp: 99.6 °F (37.6 °C)  Height: 5' 1" (154.9 cm)  Height (inches): 61 in  Weight Method: Bed Scale  Weight: 75.4 kg (166 lb 3.6 oz)  Weight (lb): 166.23 lb  Ideal Body Weight (IBW), Female: 105 lb  % Ideal Body Weight, Female (lb): 149.52 lb  BMI (Calculated): 29.7  BMI Grade: 25 - 29.9 - overweight  Usual Body Weight (UBW), k.6 kg(per chart review 2018)  % Usual Body Weight: 87.46  % Weight Change From Usual Weight: " -12.73 %       Lab/Procedures/Meds    Pertinent Labs Reviewed: reviewed  Pertinent Labs Comments: glucose 130 , alk phos 155, T bili 4, AST 95  Pertinent Medications Reviewed: reviewed  Pertinent Medications Comments: aspirin, lasix, docusate    Estimated/Assessed Needs    Weight Used For Calorie Calculations: 71.2 kg (156 lb 15.5 oz)  Energy Calorie Requirements (kcal): 1505 kcal/day  Energy Need Method: [place] Jeor(x 1.25)  Protein Requirements:  g/day(1.2-1.4 g/kg)  Weight Used For Protein Calculations: 71.2 kg (156 lb 15.5 oz)  Fluid Requirements (mL): 1 mL/kcal or per MD  Estimated Fluid Requirement Method: RDA Method  RDA Method (mL): 1505  CHO Requirement: 50% total kcal    Nutrition Prescription Ordered    Current Diet Order: NPO  Current Nutrition Support Formula Ordered: Glucerna 1.5  Current Nutrition Support Rate Ordered: 40 (ml)  Current Nutrition Support Frequency Ordered: mL/hr    Evaluation of Received Nutrient/Fluid Intake    Enteral Calories (kcal): 1440  Enteral Protein (gm): 79  Enteral (Free Water) Fluid (mL): 733  % Kcal Needs: 95  % Protein Needs: 87  Energy Calories Required: meeting needs  Protein Required: meeting needs  Fluid Required: (per MD)  Comments: LBM 2/18  Tolerance: tolerating  % Intake of Estimated Energy Needs: 75 - 100 %  % Meal Intake: NPO    Nutrition Risk    Level of Risk/Frequency of Follow-up: (f/u 1 x wk)     Assessment and Plan    Severe malnutrition    Contributing Nutrition Diagnosis  Malnutrition in the context of Acute Illness/Injury    Related to (etiology):  Decreased PO intake 2/2 abdominal pain and N/V    Signs and Symptoms (as evidenced by):  Energy Intake: <75% of estimated energy requirement for several months per patient  Body Fat Depletion: mild depletion of orbitals and triceps   Muscle Mass Depletion: mild and moderate depletion of temples, clavicle region and lower extremities   Weight Loss: 12.7% x 5 months   Fluid Accumulation:  moderate    Interventions/Recommendations (treatment strategy):  Collaboration and referral of nutrition care     Nutrition Diagnosis Status:  Continues          Monitor and Evaluation    Food and Nutrient Intake: energy intake, enteral nutrition intake  Food and Nutrient Adminstration: enteral and parenteral nutrition administration  Anthropometric Measurements: weight, weight change  Biochemical Data, Medical Tests and Procedures: electrolyte and renal panel, gastrointestinal profile, glucose/endocrine profile, inflammatory profile  Nutrition-Focused Physical Findings: overall appearance     Malnutrition Assessment                 Orbital Region (Subcutaneous Fat Loss): mild depletion  Upper Arm Region (Subcutaneous Fat Loss): mild depletion   Jew Region (Muscle Loss): moderate depletion  Clavicle Bone Region (Muscle Loss): mild depletion  Anterior Thigh Region (Muscle Loss): mild depletion   Edema (Fluid Accumulation): moderate             Nutrition Follow-Up    RD Follow-up?: Yes

## 2019-02-19 NOTE — ASSESSMENT & PLAN NOTE
62 yo female presenting with abdominal pain, nausea, elevated lactate s/p open drainage pelvic abscess 1/25     -NPO  -Tube feeds  -PO pain/nausea meds  -Continue broad spec abx - PO cipro, flagyl - estimated end date 2/26/19  -WOCN following, appreciate help  -DVT/GI prophylaxis  -PT/OT - SNF

## 2019-02-19 NOTE — CARE UPDATE
Contacted at 1211 by bedside RN, Sierra [id number] to evaluate Mrs. Sheryl Martines for BP 97/54. Will assess as soon as possible. Instructed to call  and activate a rapid response if patient's condition changes prior to arrival.

## 2019-02-19 NOTE — PLAN OF CARE
Problem: Physical Therapy Goal  Goal: Physical Therapy Goal  Goals to be met by: 19    Patient will increase functional independence with mobility by performin. Supine to sit with Moderate Assistance -not met  2. Sit to stand transfer with Maximum Assistance -not met  3. Bed to chair transfer with Maximum Assistance -not met  4. Sitting at edge of bed x10 minutes with Contact Guard Assistance - met  5. Lower extremity exercise program x10 reps, with assistance as needed - met 2/4        Goals remain appropriate. Continue with Physical therapy Plan of Care. Eileen Garcia, PT 2019

## 2019-02-19 NOTE — CARE UPDATE
RN Proactive Rounding Note  Time of Visit: 940    Admit Date: 2019  LOS: 26  Code Status: Full Code   Date of Visit: 2019  : 1955  Age: 63 y.o.  Sex: female  Race: Black or   Bed: [address] [place] [address] A:   MRN: [id number]  Was the patient discharged from an ICU this admission? yes   Was the patient discharged from a PACU within last 24 hours?  no  Did the patient receive conscious sedation/general anesthesia in last 24 hours?  no  Was the patient in the ED within the past 24 hours?  no  Was the patient started on NIPPV within the past 24 hours?  yes  Attending Physician: Monster Laurent MD  Primary Service: Networked reference to record PCT       ASSESSMENT:     Abnormal Vital Signs: AMS  Clinical Issues: Neuro     INTERVENTIONS/ RECOMMENDATIONS:     Patient seen as follow-up after stroke code called last AM. Pt noted to open eyes to voice and stimulation. Bipap mask in place 30% Fio2, 12/8. Does not follow commands but withdraws from pain. Patient noted to have coarse breath sounds bilaterally. Vitals stable, HR 98. CO2 60 on last ABG. Spoke with MD on phone reported possible need for follow-up ABG.     Discussed plan of care with RN, Sierra [id number].    PHYSICIAN ESCALATION:     Yes/No  yes    Orders received and case discussed with Dr. Ott .    Disposition: Remain in room 1007.    FOLLOW-UP/CONTINGENCY:     Call back the Rapid Response Nurse at x [phone] for additional questions or concerns.

## 2019-02-19 NOTE — SUBJECTIVE & OBJECTIVE
Interval History:   Patient seen and examined, no acute events overnight  Remains disoriented  On BiPAP  Bolus overnight for low urine output  Tolerating tube feeds  +F/BMx3  Afebrile/VSS      Medications:  Continuous Infusions:  Scheduled Meds:   alteplase  2 mg Intra-Catheter Once    aspirin  81 mg Oral Daily    ciprofloxacin HCl  500 mg Oral Q12H    clopidogrel  75 mg Oral Daily    diltiaZEM  180 mg Oral Daily    DULoxetine  60 mg Oral Daily    fluticasone-vilanterol  1 puff Inhalation Daily    furosemide  40 mg Oral BID    glucagon (human recombinant)  1 mg Intramuscular Once    heparin (porcine)  5,000 Units Subcutaneous Q8H    indomethacin  75 mg Oral BID    lactulose  20 g Per G Tube BID    metroNIDAZOLE  500 mg Oral Q8H    miconazole nitrate 2%   Topical (Top) BID    pantoprozole (PROTONIX) IV  40 mg Intravenous Q12H    senna-docusate 8.6-50 mg  1 tablet Per G Tube BID    sodium chloride 0.9%  10 mL Intravenous Q6H     PRN Meds:albuterol sulfate, albuterol-ipratropium, dextrose 50%, diclofenac sodium, diphenhydrAMINE, glucagon (human recombinant), hydrALAZINE, hydrocodone-apap 7.5-325 MG/15 ML, hydrocodone-apap 7.5-325 MG/15 ML, HYDROmorphone, insulin aspart U-100, omnipaque, omnipaque, ondansetron, promethazine (PHENERGAN) IVPB, Flushing PICC Protocol **AND** sodium chloride 0.9% **AND** sodium chloride 0.9%, sodium chloride 0.9%, sodium chloride 0.9%     Review of patient's allergies indicates:   Allergen Reactions    Ace inhibitors Swelling    Carvedilol      Other reaction(s): Other (See Comments)  blister    Hydralazine analogues     Metformin Nausea And Vomiting    Tetracycline Itching    Tetracyclines Swelling    Travatan (with benzalkonium) [travoprost (benzalkonium)]     Travoprost Itching     Other reaction(s): Other (See Comments)  Blurry vision     Objective:     Vital Signs (Most Recent):  Temp: 97.6 °F (36.4 °C) (02/19/19 0412)  Pulse: 94 (02/19/19 0735)  Resp: 17  (02/19/19 0430)  BP: (!) 105/51 (02/19/19 0412)  SpO2: 95 % (02/19/19 0720) Vital Signs (24h Range):  Temp:  [97.5 °F (36.4 °C)-99.9 °F (37.7 °C)] 97.6 °F (36.4 °C)  Pulse:  [] 94  Resp:  [15-23] 17  SpO2:  [94 %-100 %] 95 %  BP: ()/(51-60) 105/51     Weight: 75.4 kg (166 lb 3.6 oz)  Body mass index is 31.41 kg/m².    Intake/Output - Last 3 Shifts       02/17 0700 - 02/18 0659 02/18 0700 - 02/19 0659 02/19 0700 - 02/20 0659    P.O. 0 0     I.V. (mL/kg) 10 (0.1) 300 (4)     NG/GT 1092 640     IV Piggyback  500     Total Intake(mL/kg) 1102 (14.6) 1440 (19.1)     Urine (mL/kg/hr) 900 (0.5) 975 (0.5)     Emesis/NG output  0     Drains 200      Stool 0 0     Total Output 1100 975     Net +2 +465            Urine Occurrence 9 x      Stool Occurrence 2 x 3 x     Emesis Occurrence  0 x           Physical Exam   Constitutional: She appears well-developed and well-nourished. No distress.   HENT:   Head: Normocephalic and atraumatic.   Cardiovascular: Normal rate and regular rhythm.   Pulmonary/Chest: Effort normal. No respiratory distress.   Abdominal:   Soft, diffusely tender, moaning to palpation  Multiple areas of excoriation and skin breakdown noted to anterior abdomen   Neurological: She is alert.       Significant Labs:  CBC:   Recent Labs   Lab 02/19/19 0402   WBC 11.24   RBC 2.89*   HGB 8.2*   HCT 27.0*   *   MCV 93   MCH 28.4   MCHC 30.4*     BMP:   Recent Labs   Lab 02/19/19 0402   *      K 3.5      CO2 39*   BUN 9   CREATININE 0.8   CALCIUM 10.1   MG 1.8     CMP:   Recent Labs   Lab 02/19/19 0402   *   CALCIUM 10.1   ALBUMIN 1.4*   PROT 5.6*      K 3.5   CO2 39*      BUN 9   CREATININE 0.8   ALKPHOS 1,553*   ALT 24   AST 95*   BILITOT 4.0*     LFTs:   Recent Labs   Lab 02/19/19  0402   ALT 24   AST 95*   ALKPHOS 1,553*   BILITOT 4.0*   PROT 5.6*   ALBUMIN 1.4*     Coagulation:   Recent Labs   Lab 02/18/19  1208   LABPROT 11.8   INR 1.2   APTT 32.3*      ABGs:   Recent Labs   Lab 02/19/19  0626   PH 7.407   PCO2 60.1*   PO2 66*   HCO3 37.8*   POCSATURATED 92*   BE 13

## 2019-02-19 NOTE — PROGRESS NOTES
[facility]  Endocrinology  Progress Note    Admit Date: 1/23/2019     Reason for Consult: Management of T2DM, Hyperglycemia     Surgical Procedure and Date:      LAPAROTOMY, EXPLORATORY (N/A)     INCISION AND DRAINAGE, ABSCESS-  drainage of pelvic abscess (N/A)     EXCISION, ABSCESS- drainage abdominal wall abscess (N/A)     APPLICATION, WOUND VAC (N/A)       Diabetes diagnosis year: 20 years ago    Home Diabetes Medications:  Reports being taken off of insulin prior to admission.      Levemir 20 units BID     Humalog 16 units TIDWM with correction     How often checking glucose at home? >4 x day   BG readings on regimen: 110's  Hypoglycemia on the regimen?  Yes - 40's  Missed doses on regimen?  No    Diabetes Complications include:     Hypoglycemia     Complicating diabetes co morbidities:   HLD, CVA, and CAD    Lab Results   Component Value Date    HGBA1C 4.7 01/24/2019       HPI:   Patient is a 63 y.o. female with a diagnosis of sepsis and lactic acidosis secondary to abdominal abscess. Also has diagnosis of COPD, asthma, CAD, CVA (2012), and DM2. Patient receives primary care for DM in [place]. Her primary care giver is her daughter. Patient's DM is well controlled by primary Endocrinologist in [place]. Patient has not been on insulin regimen for the past 3 weeks leading up to hospitalization. According to daughter, patient was not eating, and having low BG reading. Therefore, insulin regimen was stopped by patient's primary Endocrinologist. Endocrinology at [facility] consulted to manage DM/hyperglycemia.             Interval HPI:   Overnight events: Rapid response called this afternoon, transferred to ICU. Stroke code yesterday morning, still disoriented.  TF infusing overnight, increasing to goal - low residuals noted. BG at or below goal without any correction scale insulin needs.  Eating:   NPO  Nausea: No  Hypoglycemia and intervention: No  Fever: No  TPN and/or TF: Yes  TF and rate: Glucerna  "1.5 at 45 cc/hr    BP (!) 105/51 (BP Location: Right arm, Patient Position: Lying)   Pulse 94   Temp 97.6 °F (36.4 °C) (Axillary)   Resp 17   Ht 5' 1" (1.549 m)   Wt 75.4 kg (166 lb 3.6 oz)   LMP  (LMP Unknown)   SpO2 95%   Breastfeeding? No   BMI 31.41 kg/m²      Labs Reviewed and Include    Recent Labs   Lab 02/19/19  0402   *   CALCIUM 10.1   ALBUMIN 1.4*   PROT 5.6*      K 3.5   CO2 39*      BUN 9   CREATININE 0.8   ALKPHOS 1,553*   ALT 24   AST 95*   BILITOT 4.0*     Lab Results   Component Value Date    WBC 11.24 02/19/2019    HGB 8.2 (L) 02/19/2019    HCT 27.0 (L) 02/19/2019    MCV 93 02/19/2019     (L) 02/19/2019     No results for input(s): TSH, FREET4 in the last 168 hours.  Lab Results   Component Value Date    HGBA1C 4.7 01/24/2019       Nutritional status:   Body mass index is 31.41 kg/m².  Lab Results   Component Value Date    ALBUMIN 1.4 (L) 02/19/2019    ALBUMIN 1.4 (L) 02/19/2019    ALBUMIN 1.4 (L) 02/18/2019     Lab Results   Component Value Date    PREALBUMIN 4 (L) 02/18/2019    PREALBUMIN 5 (L) 02/14/2019    PREALBUMIN 6 (L) 02/07/2019       Estimated Creatinine Clearance: 66.8 mL/min (based on SCr of 0.8 mg/dL).    Accu-Checks  Recent Labs     02/17/19  1650 02/17/19 2011 02/18/19  0025 02/18/19  0446 02/18/19  0647 02/18/19  0800 02/18/19  1542 02/18/19  2205 02/19/19  0218 02/19/19  0635   POCTGLUCOSE 115* 118* 124* 120* 133* 107 108 140* 137* 150*       Current Medications and/or Treatments Impacting Glycemic Control  Immunotherapy:    Immunosuppressants     None        Steroids:   Hormones (From admission, onward)    None        Pressors:    Autonomic Drugs (From admission, onward)    None        Hyperglycemia/Diabetes Medications:   Antihyperglycemics (From admission, onward)    Start     Stop Route Frequency Ordered    02/17/19 0902  insulin aspart U-100 pen 0-5 Units      -- SubQ Every 4 hours PRN 02/17/19 0803          ASSESSMENT and PLAN    * " Neurological finding    Managed per primary team  Avoid hypoglycemia         Type 2 diabetes mellitus    BG goal 140 - 180     Low dose correction scale  BG monitoring q 4 hours    Discharge planning: TBD         On enteral nutrition    May elevate BG       Overweight (BMI 25.0-29.9)    Body mass index is 31.41 kg/m².  may contribute to insulin resistance         Alteration in skin integrity related to surgical incision    Optimize BG control for surgical wound healing           Miguel Givens NP  Endocrinology  [facility]

## 2019-02-19 NOTE — PROGRESS NOTES
"[facility]  General Surgery  Progress Note    Subjective:     History of Present Illness:  64 yo W with a history of HTN, COPD on home oxygen, HFpEF, IDDMII, CVA on plavix, HTN, debility after fall and broken hip, and PE who presents to the ED with a several month history of nausea, vomiting, inability to tolerate a diet and weight loss. She has had multiple admissions in the past few months for different ailments. She presents today with continued nausea, vomiting and abdominal pain that is mostly worse in the RLQ. During the examination, she states her pain was all over her abdomen because she was retching so much. She states that she has lost close to 40lbs since her surgery and that she only able to keep broth down. She had a lap converted to open appendectomy back in August 2018 that was complicated by a wound infection, C diff and failure to thrive. This seems to persists. She is now wheelchair bound (per her) and apparently lives in [place] but is here in [place] with her daughter.    She had a CT scan in the ED which revealed an irregular shaped rim enhancing fluid collection measuring at least 4.0 x 3.0 cm ight hemipelvis at the site of prior appendectomy. Surgery was consulted for further recommendations. She was also noted to have a "knot" at the RLQ incision site that is also tender.    Post-Op Info:  Procedure(s) (LRB):  LAPAROTOMY, EXPLORATORY (N/A)  INCISION AND DRAINAGE, ABSCESS-  drainage of pelvic abscess (N/A)  EXCISION, ABSCESS- drainage abdominal wall abscess (N/A)  APPLICATION, WOUND VAC (N/A)   25 Days Post-Op     Interval History:   Patient seen and examined, no acute events overnight  Remains disoriented  On BiPAP  Bolus overnight for low urine output  Tolerating tube feeds  +F/BMx3  Afebrile/VSS      Medications:  Continuous Infusions:  Scheduled Meds:   alteplase  2 mg Intra-Catheter Once    aspirin  81 mg Oral Daily    ciprofloxacin HCl  500 mg Oral Q12H    " clopidogrel  75 mg Oral Daily    diltiaZEM  180 mg Oral Daily    DULoxetine  60 mg Oral Daily    fluticasone-vilanterol  1 puff Inhalation Daily    furosemide  40 mg Oral BID    glucagon (human recombinant)  1 mg Intramuscular Once    heparin (porcine)  5,000 Units Subcutaneous Q8H    indomethacin  75 mg Oral BID    lactulose  20 g Per G Tube BID    metroNIDAZOLE  500 mg Oral Q8H    miconazole nitrate 2%   Topical (Top) BID    pantoprozole (PROTONIX) IV  40 mg Intravenous Q12H    senna-docusate 8.6-50 mg  1 tablet Per G Tube BID    sodium chloride 0.9%  10 mL Intravenous Q6H     PRN Meds:albuterol sulfate, albuterol-ipratropium, dextrose 50%, diclofenac sodium, diphenhydrAMINE, glucagon (human recombinant), hydrALAZINE, hydrocodone-apap 7.5-325 MG/15 ML, hydrocodone-apap 7.5-325 MG/15 ML, HYDROmorphone, insulin aspart U-100, omnipaque, omnipaque, ondansetron, promethazine (PHENERGAN) IVPB, Flushing PICC Protocol **AND** sodium chloride 0.9% **AND** sodium chloride 0.9%, sodium chloride 0.9%, sodium chloride 0.9%     Review of patient's allergies indicates:   Allergen Reactions    Ace inhibitors Swelling    Carvedilol      Other reaction(s): Other (See Comments)  blister    Hydralazine analogues     Metformin Nausea And Vomiting    Tetracycline Itching    Tetracyclines Swelling    Travatan (with benzalkonium) [travoprost (benzalkonium)]     Travoprost Itching     Other reaction(s): Other (See Comments)  Blurry vision     Objective:     Vital Signs (Most Recent):  Temp: 97.6 °F (36.4 °C) (02/19/19 0412)  Pulse: 94 (02/19/19 0735)  Resp: 17 (02/19/19 0430)  BP: (!) 105/51 (02/19/19 0412)  SpO2: 95 % (02/19/19 0720) Vital Signs (24h Range):  Temp:  [97.5 °F (36.4 °C)-99.9 °F (37.7 °C)] 97.6 °F (36.4 °C)  Pulse:  [] 94  Resp:  [15-23] 17  SpO2:  [94 %-100 %] 95 %  BP: ()/(51-60) 105/51     Weight: 75.4 kg (166 lb 3.6 oz)  Body mass index is 31.41 kg/m².    Intake/Output - Last 3 Shifts        02/17 0700 - 02/18 0659 02/18 0700 - 02/19 0659 02/19 0700 - 02/20 0659    P.O. 0 0     I.V. (mL/kg) 10 (0.1) 300 (4)     NG/GT 1092 640     IV Piggyback  500     Total Intake(mL/kg) 1102 (14.6) 1440 (19.1)     Urine (mL/kg/hr) 900 (0.5) 975 (0.5)     Emesis/NG output  0     Drains 200      Stool 0 0     Total Output 1100 975     Net +2 +465            Urine Occurrence 9 x      Stool Occurrence 2 x 3 x     Emesis Occurrence  0 x           Physical Exam   Constitutional: She appears well-developed and well-nourished. No distress.   HENT:   Head: Normocephalic and atraumatic.   Cardiovascular: Normal rate and regular rhythm.   Pulmonary/Chest: Effort normal. No respiratory distress.   Abdominal:   Soft, diffusely tender, moaning to palpation  Multiple areas of excoriation and skin breakdown noted to anterior abdomen   Neurological: She is alert.       Significant Labs:  CBC:   Recent Labs   Lab 02/19/19  0402   WBC 11.24   RBC 2.89*   HGB 8.2*   HCT 27.0*   *   MCV 93   MCH 28.4   MCHC 30.4*     BMP:   Recent Labs   Lab 02/19/19  0402   *      K 3.5      CO2 39*   BUN 9   CREATININE 0.8   CALCIUM 10.1   MG 1.8     CMP:   Recent Labs   Lab 02/19/19  0402   *   CALCIUM 10.1   ALBUMIN 1.4*   PROT 5.6*      K 3.5   CO2 39*      BUN 9   CREATININE 0.8   ALKPHOS 1,553*   ALT 24   AST 95*   BILITOT 4.0*     LFTs:   Recent Labs   Lab 02/19/19  0402   ALT 24   AST 95*   ALKPHOS 1,553*   BILITOT 4.0*   PROT 5.6*   ALBUMIN 1.4*     Coagulation:   Recent Labs   Lab 02/18/19  1208   LABPROT 11.8   INR 1.2   APTT 32.3*     ABGs:   Recent Labs   Lab 02/19/19  0626   PH 7.407   PCO2 60.1*   PO2 66*   HCO3 37.8*   POCSATURATED 92*   BE 13     Assessment/Plan:     * Pelvic abscess in female    -S/p ex-lap with drainage of abscess. On antibiotics.  -Vanc completed.   -On antibiotics; date of completion estimated 2/26/19     Neurological finding    - appreciate vascular neurology  assistance  - likely acute encephalopathy related to infectious process      Urinary incontinence without sensory awareness    purewick     Elevated liver enzymes    -AST/ALT now downtrending  -Alk phos downtrending  -CT showed biliary sludge     Acute blood loss anemia    -continue to monitor  -Last transfused 2/11/19     Dysarthria    -PEG placed 2/11/19     Multiple skin tears    -Wound care following     Depression    -Continue Cymbalta     Hypophosphatemia    -Replace as needed     Debility    -PT/OT; history of fall with non displaced oblique right tibia fracture at metaphysis into diaphysis. Was wearing brace.  -Severely debilited; needs placement. SW consulted and following       Generalized abdominal pain    64 yo female presenting with abdominal pain, nausea, elevated lactate s/p open drainage pelvic abscess 1/25     -NPO  -Tube feeds  -PO pain/nausea meds  -Continue broad spec abx - PO cipro, flagyl - estimated end date 2/26/19  -WOCN following, appreciate help  -DVT/GI prophylaxis  -PT/OT - SNF     Severe malnutrition    -Continue tube feeds     Essential hypertension    -diltiazem, PO lasix     Gastroesophageal reflux disease without esophagitis    -Continue BID protonix     (HFpEF) heart failure with preserved ejection fraction    Last echo 8/2018 with no WMAs, grade 1DD, EF 60%  -Strict I/O, daily weights  -Continue diuresis     Moderate asthma without complication    -Continue with scheduled duonebs  -on BiPAP this AM     Elevated troponin    -Cardiology consulted. On ASA/plavix at home     CAD (coronary artery disease)    -ASA, plavix     Type 2 diabetes mellitus    -SSI, appreciate endocrine assistance          GAUTAM BurnsC   [id number]  General Surgery  [facility]

## 2019-02-19 NOTE — PLAN OF CARE
09:12 AM Per Dr. Chyna Ott, patient is not medically stable for discharge. Plan A: O SNF pending acceptance vs Plan B: home w/HH for patient only has hospital based SNF eligibility as a insurance benefit.     09:30 AM ION Morales, updated on above.

## 2019-02-19 NOTE — CARE UPDATE
RN Proactive Rounding Note  Time of Visit:     Admit Date: 2019  LOS: 25  Code Status: Full Code   Date of Visit: 2019  : 1955  Age: 63 y.o.  Sex: female  Race: Black or   Bed: [address] [place] [address] A:   MRN: [id number]  Was the patient discharged from an ICU this admission? yes   Was the patient discharged from a PACU within last 24 hours?  no  Did the patient receive conscious sedation/general anesthesia in last 24 hours?  no  Was the patient in the ED within the past 24 hours?  no  Was the patient started on NIPPV within the past 24 hours?  yes  Attending Physician: Monster Laurent MD  Primary Service: Networked reference to record PCT       ASSESSMENT:     Abnormal Vital Signs:  Clinical Issues: Neuro     INTERVENTIONS/ RECOMMENDATIONS:     Pt with stroke code and decreased LOC since this AM. VSS. Currently on BiPAP for elevated CO2. Still fairly somnolent. Dr. Ott notified of patients status and will go reevaluate. Will continue to monitor patient.    Discussed plan of care with RNChang (Charge).    PHYSICIAN ESCALATION:     Yes/No  yes    Orders received and case discussed with Dr. Ott .    Disposition: Remain in room 1007.    FOLLOW-UP/CONTINGENCY:     Call back the Rapid Response Nurse at x [phone] for additional questions or concerns.

## 2019-02-19 NOTE — PT/OT/SLP PROGRESS
Physical Therapy Treatment    Patient Name:  Sheryl Martines   MRN:  [id number]    Recommendations:     Discharge Recommendations:  nursing facility, skilled   Discharge Equipment Recommendations: none   Barriers to discharge: Decreased caregiver support    Assessment:     Sheryl Martines is a 63 y.o. female admitted with a medical diagnosis of Pelvic abscess in female.  She presents with the following impairments/functional limitations:  weakness, impaired endurance, impaired functional mobilty, impaired balance, gait instability, decreased upper extremity function, decreased lower extremity function, pain Patient now using bipap. Patient able to sit EOB w/ min to TA for 5 minutes. Patient will benefit from continued physical therapy to address deficits and improve safety and functional mobility. Continue with physical therapy plan of care. .    Rehab Prognosis: Fair; patient would benefit from acute skilled PT services to address these deficits and reach maximum level of function.    Recent Surgery: Procedure(s) (LRB):  LAPAROTOMY, EXPLORATORY (N/A)  INCISION AND DRAINAGE, ABSCESS-  drainage of pelvic abscess (N/A)  EXCISION, ABSCESS- drainage abdominal wall abscess (N/A)  APPLICATION, WOUND VAC (N/A) 25 Days Post-Op    Plan:     During this hospitalization, patient to be seen 3 x/week to address the identified rehab impairments via therapeutic exercises, therapeutic groups, neuromuscular re-education, wheelchair management/training and progress toward the following goals:    · Plan of Care Expires:  02/24/19    Subjective     Chief Complaint: none stated  Patient/Family Comments/goals: none stated. Patient non verbal this session. Using bipap  Pain/Comfort:  · Pain Rating 1: other (see comments)(unable to rate; moaning; had pain meds prior)  · Pain Addressed 1: Pre-medicate for activity, Cessation of Activity, Distraction  · Pain Rating Post-Intervention 1: 0/10      Objective:     Communicated with nurse prior  to session.  Patient found all lines intact and call button in reach BIPAP, nieves catheter, oxygen, PICC line  upon PT entry to room.     General Precautions: Standard, aspiration, NPO, contact   Orthopedic Precautions:N/A   Braces: N/A     Functional Mobility:  · Bed Mobility:     · Supine to Sit: total assistance and of 2 persons  · Sit to Supine: total assistance and of 2 persons  Assist for trunk and LEs; draw sheet to HOB    AM-PAC 6 CLICK MOBILITY  Turning over in bed (including adjusting bedclothes, sheets and blankets)?: 2  Sitting down on and standing up from a chair with arms (e.g., wheelchair, bedside commode, etc.): 1  Moving from lying on back to sitting on the side of the bed?: 2  Moving to and from a bed to a chair (including a wheelchair)?: 1  Need to walk in hospital room?: 1  Climbing 3-5 steps with a railing?: 1  Basic Mobility Total Score: 8       Therapeutic Activities and Exercises:   Patient sits EOB x5 minutes w/ varying min to total assistance. (TA while performing LE exercises w/ increased posterior leaning)  BLE PROM x10 in sitting: Hip flexion, knee flexion /extension, ankle DF/PF.   c0-treatment w/ OT    Patient left supine with all lines intact and call button in reach..    GOALS:   Multidisciplinary Problems     Physical Therapy Goals        Problem: Physical Therapy Goal    Goal Priority Disciplines Outcome Goal Variances Interventions   Physical Therapy Goal     PT, PT/OT Ongoing (interventions implemented as appropriate)     Description:  Goals to be met by: 19    Patient will increase functional independence with mobility by performin. Supine to sit with Moderate Assistance -not met  2. Sit to stand transfer with Maximum Assistance -not met  3. Bed to chair transfer with Maximum Assistance -not met  4. Sitting at edge of bed x10 minutes with Contact Guard Assistance - met  5. Lower extremity exercise program x10 reps, with assistance as needed - met 2/4                          Time Tracking:     PT Received On: 02/19/19  PT Start Time: 1044     PT Stop Time: 1059  PT Total Time (min): 15 min     Billable Minutes: Therapeutic Exercise 8    Treatment Type: Treatment  PT/PTA: PT     PTA Visit Number: 0     Eileen Garcia, PT  02/19/2019

## 2019-02-19 NOTE — SIGNIFICANT EVENT
Rapid response called due to decrease mentation and responsiveness. Patient seen and examined. She minimal opens her eyes and only moans. Does not follow commands at all. CT chest/abd/pelvis from 2/18/19 with no findings needing surgical intervention. Concern for possible aspiration pneumonia given findings in the RUL. Will transfer patient to ICU, send a full set of labs, cultures and update family. Discussed with ICU charge nurse, ICU resident, and Dr. Laurent.    Christopher Huang MD  General Surgery  PGY-5  [phone] (pager)

## 2019-02-19 NOTE — PLAN OF CARE
Problem: Occupational Therapy Goal  Goal: Occupational Therapy Goal  Goals to be met by: 2/21/19; revised from 2/12/19     Patient will increase functional independence with ADLs by performing:    Feeding with Set-up Assistance. - MET 2/9  UE Dressing with Set-up Assistance.  LE Dressing with Moderate Assistance.  Grooming while seated with Set-up Assistance.  Toileting from bedside commode with Maximum Assistance for hygiene and clothing management.   Toilet transfer to bedside commode with Maximum Assistance.       Outcome: Ongoing (interventions implemented as appropriate)  Pt continues to work toward established goals    Comments: Continue OT GURINDER Dahl OT  2/19/2019

## 2019-02-19 NOTE — PT/OT/SLP PROGRESS
Occupational Therapy   Treatment    Name: Sheryl Martines  MRN: [id number]  Admitting Diagnosis:  Pelvic abscess in female  25 Days Post-Op    Recommendations:     Discharge Recommendations: nursing facility, skilled  Discharge Equipment Recommendations:  (TBD)  Barriers to discharge:  Decreased caregiver support    Assessment:     Sheryl Martines is a 63 y.o. female with a medical diagnosis of Pelvic abscess in female.  She presents with lethargy today impairing participation in OT/PT session. Pt able to sit EOB for ~5 min, but no engagement in UE/LE exercises. Performance deficits affecting function are weakness, impaired endurance, impaired balance, gait instability, impaired self care skills, impaired functional mobilty, pain, decreased lower extremity function, decreased upper extremity function, edema, impaired cardiopulmonary response to activity.     Rehab Prognosis:  Fair; patient would benefit from acute skilled OT services to address these deficits and reach maximum level of function.       Plan:     Patient to be seen 2 x/week to address the above listed problems via self-care/home management, therapeutic activities, therapeutic exercises  · Plan of Care Expires: 02/28/19  · Plan of Care Reviewed with: patient    Subjective     Pain/Comfort:  · Pain Rating 1: (Pt did not rate, but moaning in response to movement)  · Pain Addressed 1: Reposition, Distraction, Pre-medicate for activity, Cessation of Activity  · Pain Rating Post-Intervention 1: 0/10    Objective:     Communicated with: RN prior to session.  Patient found left sidelying with BIPAP, nieves catheter, PICC line upon OT entry to room.    General Precautions: Standard, fall, contact, aspiration, NPO   Orthopedic Precautions:N/A   Braces: N/A     Occupational Performance:     Bed Mobility:    · Patient completed Scooting/Bridging with total assistance  · Patient completed Supine to Sit with total assistance  · Patient completed Sit to Supine with  total assistance   · Pt seated EOB ~5 min with min A when using BUE propping; total A for sitting balance when engaged in BLE exercises at EOB    Functional Mobility/Transfers:  · Pt lethargic and no active movement during LE exercises, therefore did not attempt standing or t/f    Activities of Daily Living:  · NT      AMPAC 6 Click ADL: 8    Treatment & Education:  BUE PROM exercises 5 x 1 for shoulder flex to 90, elbow flex/ext, and wrist flex/ext while supine with HOB elevated; Pt resistant to movement of her arms  Pt educated on role of OT/POC  Pt educated on importance of ambulation/UIC  White board/communication board updated    Patient left HOB elevated with all lines intact, call button in reach and RN notifiedEducation:      GOALS:   Multidisciplinary Problems     Occupational Therapy Goals        Problem: Occupational Therapy Goal    Goal Priority Disciplines Outcome Interventions   Occupational Therapy Goal     OT, PT/OT Ongoing (interventions implemented as appropriate)    Description:  Goals to be met by: 2/21/19; revised from 2/12/19     Patient will increase functional independence with ADLs by performing:    Feeding with Set-up Assistance. - MET 2/9  UE Dressing with Set-up Assistance.  LE Dressing with Moderate Assistance.  Grooming while seated with Set-up Assistance.  Toileting from bedside commode with Maximum Assistance for hygiene and clothing management.   Toilet transfer to bedside commode with Maximum Assistance.                        Time Tracking:     OT Date of Treatment: 02/19/19  OT Start Time: 1043  OT Stop Time: 1059  OT Total Time (min): 16 min    Billable Minutes:Neuromuscular Re-education 16    Chyna Dahl OT  2/19/2019

## 2019-02-20 ENCOUNTER — PATIENT MESSAGE (OUTPATIENT)
Dept: SURGERY | Facility: CLINIC | Age: 64
End: 2019-02-20

## 2019-02-20 PROBLEM — R94.6 ABNORMAL THYROID FUNCTION TEST: Status: ACTIVE | Noted: 2019-02-20

## 2019-02-20 PROBLEM — I10 ESSENTIAL HYPERTENSION: Status: RESOLVED | Noted: 2018-09-21 | Resolved: 2019-02-20

## 2019-02-20 PROBLEM — N30.00 ACUTE CYSTITIS: Status: ACTIVE | Noted: 2019-02-20

## 2019-02-20 LAB
ACE SERPL-CCNC: NORMAL U/L
ALBUMIN SERPL BCP-MCNC: 1.4 G/DL
ALLENS TEST: ABNORMAL
ALP SERPL-CCNC: 1747 U/L
ALT SERPL W/O P-5'-P-CCNC: 23 U/L
AMMONIA PLAS-SCNC: 58 UMOL/L
ANION GAP SERPL CALC-SCNC: 10 MMOL/L
ANION GAP SERPL CALC-SCNC: 7 MMOL/L
AST SERPL-CCNC: 103 U/L
BASOPHILS # BLD AUTO: 0.05 K/UL
BASOPHILS # BLD AUTO: 0.11 K/UL
BASOPHILS NFR BLD: 0.3 %
BASOPHILS NFR BLD: 0.6 %
BILIRUB SERPL-MCNC: 4.8 MG/DL
BUN SERPL-MCNC: 8 MG/DL
BUN SERPL-MCNC: 9 MG/DL
CA-I BLDV-SCNC: 1.26 MMOL/L
CALCIUM SERPL-MCNC: 10.3 MG/DL
CALCIUM SERPL-MCNC: 10.4 MG/DL
CHLORIDE SERPL-SCNC: 103 MMOL/L
CHLORIDE SERPL-SCNC: 104 MMOL/L
CO2 SERPL-SCNC: 30 MMOL/L
CO2 SERPL-SCNC: 35 MMOL/L
CORTIS SERPL-MCNC: 4.2 UG/DL
CREAT SERPL-MCNC: 0.8 MG/DL
CREAT SERPL-MCNC: 0.9 MG/DL
DELSYS: ABNORMAL
DIFFERENTIAL METHOD: ABNORMAL
DIFFERENTIAL METHOD: ABNORMAL
EOSINOPHIL # BLD AUTO: 0 K/UL
EOSINOPHIL # BLD AUTO: 0.8 K/UL
EOSINOPHIL NFR BLD: 0.1 %
EOSINOPHIL NFR BLD: 4.4 %
EP: 5
EP: 5
EP: 8
ERYTHROCYTE [DISTWIDTH] IN BLOOD BY AUTOMATED COUNT: 17.9 %
ERYTHROCYTE [DISTWIDTH] IN BLOOD BY AUTOMATED COUNT: 18 %
ERYTHROCYTE [SEDIMENTATION RATE] IN BLOOD BY WESTERGREN METHOD: 20 MM/H
ERYTHROCYTE [SEDIMENTATION RATE] IN BLOOD BY WESTERGREN METHOD: 20 MM/H
EST. GFR  (AFRICAN AMERICAN): >60 ML/MIN/1.73 M^2
EST. GFR  (AFRICAN AMERICAN): >60 ML/MIN/1.73 M^2
EST. GFR  (NON AFRICAN AMERICAN): >60 ML/MIN/1.73 M^2
EST. GFR  (NON AFRICAN AMERICAN): >60 ML/MIN/1.73 M^2
FIO2: 35
FIO2: 40
GLUCOSE SERPL-MCNC: 113 MG/DL
GLUCOSE SERPL-MCNC: 239 MG/DL
HCO3 UR-SCNC: 33.1 MMOL/L (ref 24–28)
HCO3 UR-SCNC: 33.2 MMOL/L (ref 24–28)
HCO3 UR-SCNC: 37 MMOL/L (ref 24–28)
HCT VFR BLD AUTO: 25.5 %
HCT VFR BLD AUTO: 27.5 %
HGB BLD-MCNC: 7.4 G/DL
HGB BLD-MCNC: 8.1 G/DL
IMM GRANULOCYTES # BLD AUTO: 0.11 K/UL
IMM GRANULOCYTES # BLD AUTO: 0.12 K/UL
IMM GRANULOCYTES NFR BLD AUTO: 0.6 %
IMM GRANULOCYTES NFR BLD AUTO: 0.7 %
IP: 12
IP: 12
IP: 15
LACTATE SERPL-SCNC: 2.1 MMOL/L
LACTATE SERPL-SCNC: 2.2 MMOL/L
LACTATE SERPL-SCNC: 2.4 MMOL/L
LACTATE SERPL-SCNC: 2.5 MMOL/L
LACTATE SERPL-SCNC: 2.6 MMOL/L
LACTATE SERPL-SCNC: 3.6 MMOL/L
LYMPHOCYTES # BLD AUTO: 1.9 K/UL
LYMPHOCYTES # BLD AUTO: 3.3 K/UL
LYMPHOCYTES NFR BLD: 11.1 %
LYMPHOCYTES NFR BLD: 18.9 %
MAGNESIUM SERPL-MCNC: 1.7 MG/DL
MAGNESIUM SERPL-MCNC: 1.8 MG/DL
MCH RBC QN AUTO: 27.7 PG
MCH RBC QN AUTO: 27.9 PG
MCHC RBC AUTO-ENTMCNC: 29 G/DL
MCHC RBC AUTO-ENTMCNC: 29.5 G/DL
MCV RBC AUTO: 95 FL
MCV RBC AUTO: 96 FL
MIN VOL: 12.5
MIN VOL: 13
MODE: ABNORMAL
MONOCYTES # BLD AUTO: 0.4 K/UL
MONOCYTES # BLD AUTO: 1.6 K/UL
MONOCYTES NFR BLD: 2.4 %
MONOCYTES NFR BLD: 8.9 %
NEUTROPHILS # BLD AUTO: 11.7 K/UL
NEUTROPHILS # BLD AUTO: 14.9 K/UL
NEUTROPHILS NFR BLD: 66.6 %
NEUTROPHILS NFR BLD: 85.4 %
NRBC BLD-RTO: 0 /100 WBC
NRBC BLD-RTO: 0 /100 WBC
PCO2 BLDA: 50 MMHG (ref 35–45)
PCO2 BLDA: 51.2 MMHG (ref 35–45)
PCO2 BLDA: 56.5 MMHG (ref 35–45)
PH SMN: 7.42 [PH] (ref 7.35–7.45)
PH SMN: 7.42 [PH] (ref 7.35–7.45)
PH SMN: 7.43 [PH] (ref 7.35–7.45)
PHOSPHATE SERPL-MCNC: 2.4 MG/DL
PHOSPHATE SERPL-MCNC: 3.1 MG/DL
PLATELET # BLD AUTO: 152 K/UL
PLATELET # BLD AUTO: 161 K/UL
PMV BLD AUTO: 12.4 FL
PMV BLD AUTO: 12.7 FL
PO2 BLDA: 105 MMHG (ref 80–100)
PO2 BLDA: 61 MMHG (ref 80–100)
PO2 BLDA: 92 MMHG (ref 80–100)
POC BE: 13 MMOL/L
POC BE: 9 MMOL/L
POC BE: 9 MMOL/L
POC SATURATED O2: 91 % (ref 95–100)
POC SATURATED O2: 97 % (ref 95–100)
POC SATURATED O2: 98 % (ref 95–100)
POC TCO2: 35 MMOL/L (ref 23–27)
POC TCO2: 35 MMOL/L (ref 23–27)
POC TCO2: 39 MMOL/L (ref 23–27)
POCT GLUCOSE: 130 MG/DL (ref 70–110)
POCT GLUCOSE: 156 MG/DL (ref 70–110)
POCT GLUCOSE: 195 MG/DL (ref 70–110)
POCT GLUCOSE: 200 MG/DL (ref 70–110)
POCT GLUCOSE: 330 MG/DL (ref 70–110)
POTASSIUM SERPL-SCNC: 4 MMOL/L
POTASSIUM SERPL-SCNC: 4.9 MMOL/L
PROT SERPL-MCNC: 5.6 G/DL
RBC # BLD AUTO: 2.67 M/UL
RBC # BLD AUTO: 2.9 M/UL
SAMPLE: ABNORMAL
SITE: ABNORMAL
SODIUM SERPL-SCNC: 143 MMOL/L
SODIUM SERPL-SCNC: 146 MMOL/L
SP02: 100
SP02: 98
SPONT RATE: 20
SPONT RATE: 28
WBC # BLD AUTO: 17.4 K/UL
WBC # BLD AUTO: 17.59 K/UL

## 2019-02-20 PROCEDURE — 99900035 HC TECH TIME PER 15 MIN (STAT)

## 2019-02-20 PROCEDURE — 85025 COMPLETE CBC W/AUTO DIFF WBC: CPT

## 2019-02-20 PROCEDURE — 99232 PR SUBSEQUENT HOSPITAL CARE,LEVL II: ICD-10-PCS | Mod: GC,,, | Performed by: INTERNAL MEDICINE

## 2019-02-20 PROCEDURE — 25000003 PHARM REV CODE 250: Performed by: PHYSICIAN ASSISTANT

## 2019-02-20 PROCEDURE — 80053 COMPREHEN METABOLIC PANEL: CPT

## 2019-02-20 PROCEDURE — 37799 UNLISTED PX VASCULAR SURGERY: CPT

## 2019-02-20 PROCEDURE — 82330 ASSAY OF CALCIUM: CPT

## 2019-02-20 PROCEDURE — C9113 INJ PANTOPRAZOLE SODIUM, VIA: HCPCS | Performed by: STUDENT IN AN ORGANIZED HEALTH CARE EDUCATION/TRAINING PROGRAM

## 2019-02-20 PROCEDURE — 99233 SBSQ HOSP IP/OBS HIGH 50: CPT | Mod: GC,,, | Performed by: INTERNAL MEDICINE

## 2019-02-20 PROCEDURE — 63600175 PHARM REV CODE 636 W HCPCS: Performed by: STUDENT IN AN ORGANIZED HEALTH CARE EDUCATION/TRAINING PROGRAM

## 2019-02-20 PROCEDURE — 27000221 HC OXYGEN, UP TO 24 HOURS

## 2019-02-20 PROCEDURE — 84100 ASSAY OF PHOSPHORUS: CPT | Mod: 91

## 2019-02-20 PROCEDURE — 25000003 PHARM REV CODE 250: Performed by: STUDENT IN AN ORGANIZED HEALTH CARE EDUCATION/TRAINING PROGRAM

## 2019-02-20 PROCEDURE — 83735 ASSAY OF MAGNESIUM: CPT

## 2019-02-20 PROCEDURE — 82533 TOTAL CORTISOL: CPT

## 2019-02-20 PROCEDURE — 94660 CPAP INITIATION&MGMT: CPT

## 2019-02-20 PROCEDURE — 84100 ASSAY OF PHOSPHORUS: CPT

## 2019-02-20 PROCEDURE — P9045 ALBUMIN (HUMAN), 5%, 250 ML: HCPCS | Mod: JG | Performed by: STUDENT IN AN ORGANIZED HEALTH CARE EDUCATION/TRAINING PROGRAM

## 2019-02-20 PROCEDURE — 99233 PR SUBSEQUENT HOSPITAL CARE,LEVL III: ICD-10-PCS | Mod: GC,,, | Performed by: INTERNAL MEDICINE

## 2019-02-20 PROCEDURE — 36600 WITHDRAWAL OF ARTERIAL BLOOD: CPT

## 2019-02-20 PROCEDURE — 63600175 PHARM REV CODE 636 W HCPCS: Mod: JG | Performed by: STUDENT IN AN ORGANIZED HEALTH CARE EDUCATION/TRAINING PROGRAM

## 2019-02-20 PROCEDURE — 99233 SBSQ HOSP IP/OBS HIGH 50: CPT | Mod: 24,,, | Performed by: SURGERY

## 2019-02-20 PROCEDURE — 83605 ASSAY OF LACTIC ACID: CPT | Mod: 91

## 2019-02-20 PROCEDURE — 63600175 PHARM REV CODE 636 W HCPCS: Performed by: SURGERY

## 2019-02-20 PROCEDURE — 83735 ASSAY OF MAGNESIUM: CPT | Mod: 91

## 2019-02-20 PROCEDURE — 25000003 PHARM REV CODE 250: Performed by: SURGERY

## 2019-02-20 PROCEDURE — 94640 AIRWAY INHALATION TREATMENT: CPT

## 2019-02-20 PROCEDURE — 20000000 HC ICU ROOM

## 2019-02-20 PROCEDURE — 82140 ASSAY OF AMMONIA: CPT

## 2019-02-20 PROCEDURE — 80048 BASIC METABOLIC PNL TOTAL CA: CPT

## 2019-02-20 PROCEDURE — 99232 SBSQ HOSP IP/OBS MODERATE 35: CPT | Mod: GC,,, | Performed by: INTERNAL MEDICINE

## 2019-02-20 PROCEDURE — A4216 STERILE WATER/SALINE, 10 ML: HCPCS | Performed by: SURGERY

## 2019-02-20 PROCEDURE — 82803 BLOOD GASES ANY COMBINATION: CPT

## 2019-02-20 PROCEDURE — 25000242 PHARM REV CODE 250 ALT 637 W/ HCPCS: Performed by: STUDENT IN AN ORGANIZED HEALTH CARE EDUCATION/TRAINING PROGRAM

## 2019-02-20 PROCEDURE — 94761 N-INVAS EAR/PLS OXIMETRY MLT: CPT

## 2019-02-20 PROCEDURE — 99233 PR SUBSEQUENT HOSPITAL CARE,LEVL III: ICD-10-PCS | Mod: 24,,, | Performed by: SURGERY

## 2019-02-20 RX ORDER — DILTIAZEM HYDROCHLORIDE 30 MG/1
60 TABLET, FILM COATED ORAL EVERY 8 HOURS
Status: DISCONTINUED | OUTPATIENT
Start: 2019-02-20 | End: 2019-02-22

## 2019-02-20 RX ORDER — SODIUM,POTASSIUM PHOSPHATES 280-250MG
2 POWDER IN PACKET (EA) ORAL
Status: DISCONTINUED | OUTPATIENT
Start: 2019-02-20 | End: 2019-02-25

## 2019-02-20 RX ORDER — POTASSIUM CHLORIDE 20 MEQ/15ML
40 SOLUTION ORAL
Status: DISCONTINUED | OUTPATIENT
Start: 2019-02-20 | End: 2019-02-25

## 2019-02-20 RX ORDER — POTASSIUM CHLORIDE 20 MEQ/15ML
60 SOLUTION ORAL
Status: DISCONTINUED | OUTPATIENT
Start: 2019-02-20 | End: 2019-02-25

## 2019-02-20 RX ORDER — ALBUMIN HUMAN 50 G/1000ML
12.5 SOLUTION INTRAVENOUS ONCE
Status: COMPLETED | OUTPATIENT
Start: 2019-02-20 | End: 2019-02-20

## 2019-02-20 RX ORDER — DULOXETIN HYDROCHLORIDE 30 MG/1
30 CAPSULE, DELAYED RELEASE ORAL 2 TIMES DAILY
Status: DISCONTINUED | OUTPATIENT
Start: 2019-02-20 | End: 2019-02-20

## 2019-02-20 RX ORDER — THEOPHYLLINE 100 MG/1
100 TABLET, EXTENDED RELEASE ORAL EVERY 8 HOURS
Status: DISCONTINUED | OUTPATIENT
Start: 2019-02-20 | End: 2019-02-20

## 2019-02-20 RX ORDER — THEOPHYLLINE 80 MG/15ML
100 SOLUTION ORAL EVERY 8 HOURS
Status: DISCONTINUED | OUTPATIENT
Start: 2019-02-20 | End: 2019-02-20

## 2019-02-20 RX ORDER — DEXTROSE MONOHYDRATE, SODIUM CHLORIDE, AND POTASSIUM CHLORIDE 50; 1.49; 4.5 G/1000ML; G/1000ML; G/1000ML
INJECTION, SOLUTION INTRAVENOUS CONTINUOUS
Status: DISCONTINUED | OUTPATIENT
Start: 2019-02-20 | End: 2019-02-22

## 2019-02-20 RX ORDER — MAGNESIUM SULFATE HEPTAHYDRATE 40 MG/ML
4 INJECTION, SOLUTION INTRAVENOUS
Status: DISCONTINUED | OUTPATIENT
Start: 2019-02-20 | End: 2019-02-25

## 2019-02-20 RX ORDER — MAGNESIUM SULFATE HEPTAHYDRATE 40 MG/ML
2 INJECTION, SOLUTION INTRAVENOUS
Status: DISCONTINUED | OUTPATIENT
Start: 2019-02-20 | End: 2019-02-25

## 2019-02-20 RX ADMIN — CLOPIDOGREL 75 MG: 75 TABLET, FILM COATED ORAL at 09:02

## 2019-02-20 RX ADMIN — SODIUM CHLORIDE, SODIUM LACTATE, POTASSIUM CHLORIDE, AND CALCIUM CHLORIDE 1000 ML: .6; .31; .03; .02 INJECTION, SOLUTION INTRAVENOUS at 07:02

## 2019-02-20 RX ADMIN — PIPERACILLIN AND TAZOBACTAM 4.5 G: 4; .5 INJECTION, POWDER, LYOPHILIZED, FOR SOLUTION INTRAVENOUS; PARENTERAL at 02:02

## 2019-02-20 RX ADMIN — Medication 1250 MG: at 05:02

## 2019-02-20 RX ADMIN — PANTOPRAZOLE SODIUM 40 MG: 40 INJECTION, POWDER, FOR SOLUTION INTRAVENOUS at 09:02

## 2019-02-20 RX ADMIN — HEPARIN SODIUM 5000 UNITS: 5000 INJECTION, SOLUTION INTRAVENOUS; SUBCUTANEOUS at 09:02

## 2019-02-20 RX ADMIN — HYDROCORTISONE SODIUM SUCCINATE 100 MG: 100 INJECTION, POWDER, FOR SOLUTION INTRAMUSCULAR; INTRAVENOUS at 09:02

## 2019-02-20 RX ADMIN — IPRATROPIUM BROMIDE AND ALBUTEROL SULFATE 3 ML: .5; 3 SOLUTION RESPIRATORY (INHALATION) at 04:02

## 2019-02-20 RX ADMIN — DILTIAZEM HYDROCHLORIDE 60 MG: 30 TABLET, FILM COATED ORAL at 09:02

## 2019-02-20 RX ADMIN — PIPERACILLIN AND TAZOBACTAM 4.5 G: 4; .5 INJECTION, POWDER, LYOPHILIZED, FOR SOLUTION INTRAVENOUS; PARENTERAL at 11:02

## 2019-02-20 RX ADMIN — INSULIN ASPART 1 UNITS: 100 INJECTION, SOLUTION INTRAVENOUS; SUBCUTANEOUS at 11:02

## 2019-02-20 RX ADMIN — POTASSIUM CHLORIDE, DEXTROSE MONOHYDRATE AND SODIUM CHLORIDE: 150; 5; 450 INJECTION, SOLUTION INTRAVENOUS at 10:02

## 2019-02-20 RX ADMIN — PREDNISONE 10 MG: 5 SOLUTION ORAL at 09:02

## 2019-02-20 RX ADMIN — IPRATROPIUM BROMIDE AND ALBUTEROL SULFATE 3 ML: .5; 3 SOLUTION RESPIRATORY (INHALATION) at 08:02

## 2019-02-20 RX ADMIN — MICONAZOLE NITRATE: 20 OINTMENT TOPICAL at 09:02

## 2019-02-20 RX ADMIN — ASPIRIN 81 MG CHEWABLE TABLET 81 MG: 81 TABLET CHEWABLE at 09:02

## 2019-02-20 RX ADMIN — STANDARDIZED SENNA CONCENTRATE AND DOCUSATE SODIUM 1 TABLET: 8.6; 5 TABLET, FILM COATED ORAL at 09:02

## 2019-02-20 RX ADMIN — POTASSIUM CHLORIDE, DEXTROSE MONOHYDRATE AND SODIUM CHLORIDE: 150; 5; 450 INJECTION, SOLUTION INTRAVENOUS at 05:02

## 2019-02-20 RX ADMIN — INDOMETHACIN 75 MG: 75 CAPSULE, EXTENDED RELEASE ORAL at 09:02

## 2019-02-20 RX ADMIN — Medication 10 ML: at 06:02

## 2019-02-20 RX ADMIN — MICONAZOLE NITRATE: 20 OINTMENT TOPICAL at 10:02

## 2019-02-20 RX ADMIN — ALBUMIN HUMAN 12.5 G: 0.05 INJECTION, SOLUTION INTRAVENOUS at 04:02

## 2019-02-20 RX ADMIN — INDOMETHACIN 75 MG: 75 CAPSULE, EXTENDED RELEASE ORAL at 08:02

## 2019-02-20 RX ADMIN — DILTIAZEM HYDROCHLORIDE 60 MG: 30 TABLET, FILM COATED ORAL at 03:02

## 2019-02-20 RX ADMIN — HEPARIN SODIUM 5000 UNITS: 5000 INJECTION, SOLUTION INTRAVENOUS; SUBCUTANEOUS at 05:02

## 2019-02-20 RX ADMIN — SODIUM CHLORIDE, SODIUM LACTATE, POTASSIUM CHLORIDE, AND CALCIUM CHLORIDE 1000 ML: .6; .31; .03; .02 INJECTION, SOLUTION INTRAVENOUS at 08:02

## 2019-02-20 RX ADMIN — HYDROCORTISONE SODIUM SUCCINATE 100 MG: 100 INJECTION, POWDER, FOR SOLUTION INTRAMUSCULAR; INTRAVENOUS at 11:02

## 2019-02-20 RX ADMIN — LACTULOSE 20 G: 20 SOLUTION ORAL at 09:02

## 2019-02-20 RX ADMIN — MAGNESIUM SULFATE HEPTAHYDRATE 2 G: 40 INJECTION, SOLUTION INTRAVENOUS at 04:02

## 2019-02-20 RX ADMIN — Medication 10 ML: at 12:02

## 2019-02-20 RX ADMIN — IPRATROPIUM BROMIDE AND ALBUTEROL SULFATE 3 ML: .5; 3 SOLUTION RESPIRATORY (INHALATION) at 11:02

## 2019-02-20 RX ADMIN — ALBUMIN HUMAN 12.5 G: 0.05 INJECTION, SOLUTION INTRAVENOUS at 06:02

## 2019-02-20 RX ADMIN — SODIUM CHLORIDE, SODIUM LACTATE, POTASSIUM CHLORIDE, AND CALCIUM CHLORIDE 1000 ML: .6; .31; .03; .02 INJECTION, SOLUTION INTRAVENOUS at 12:02

## 2019-02-20 RX ADMIN — STANDARDIZED SENNA CONCENTRATE AND DOCUSATE SODIUM 1 TABLET: 8.6; 5 TABLET, FILM COATED ORAL at 08:02

## 2019-02-20 RX ADMIN — MAGNESIUM SULFATE HEPTAHYDRATE 2 G: 40 INJECTION, SOLUTION INTRAVENOUS at 11:02

## 2019-02-20 RX ADMIN — PIPERACILLIN AND TAZOBACTAM 4.5 G: 4; .5 INJECTION, POWDER, LYOPHILIZED, FOR SOLUTION INTRAVENOUS; PARENTERAL at 06:02

## 2019-02-20 RX ADMIN — LACTULOSE 20 G: 20 SOLUTION ORAL at 08:02

## 2019-02-20 RX ADMIN — HEPARIN SODIUM 5000 UNITS: 5000 INJECTION, SOLUTION INTRAVENOUS; SUBCUTANEOUS at 03:02

## 2019-02-20 RX ADMIN — Medication 10 ML: at 05:02

## 2019-02-20 NOTE — ASSESSMENT & PLAN NOTE
63 y.o. female w/ a significant PMHx of COPD, CAD, T2DM, CKD, HTN, CVA w/ residual right sided deficits, significant debility, and recent exploratory laparotomy for pelvis mass/abscess that required an ICU admission postoperatively. Patient stepped back up to the SICU 02/19/19 s/p rapid response for somnolence and altered mental status.    Neuro:   - Patient minimally interactive  - Responds to repeated stimulation  - CT Head negative for any acute processes  - Not showing any focal neurological deficits  - Ammonia level of 84 umol/L at time of RR  - Lactulose 20g per gtube, titrating to 3-4 loose BMs/day    Pulmonary:   - Patient w/ a Hx of hypercapnic respiratory failure on this admission requiring intubation  - Currently on BiPAP 15/8 @ 35% FiO2  - Low threshold to intubate given Hx and mental status  - CXR daily  - ABGs PRN  - Started on empiric broad spectrum Abx  - Questionable RUL opacification concerning for developing PNA    Cardiac:   - Currently HDS, not requiring any pressors  - Hx of CAD and HTN  - Continuing home meds    Renal:    - Questionable Hx of CKD  - No acute issues on this admission  - Currently on Lasix 40 mg PO BID, will hold for now  - Continue nieves for strict I/Os  - Trend BUN/Cr     ID:   - Afebrile over last 24H  - New onset leukocytosis of 14.06   - Questionable new RUL opacification on CT chest  - Started on empiric broad spectrum Abx given clinical picture  - Mild lactic acidosis of 2.6, trending Q4H  - Trend WBC w/ daily CBCs     Hem/Onc:   - H/H trending down over the last 24H  - Currently HDS w/ no signs of active bleeding  - CT abdomen/pelvis 02/18/19 w/ no acute processes  - Trend H/H w/ daily CBCs  - Transfuse for Hgb < 7.0 g/dL    Endocrine:    - Hx of T2DM  - Endocrine consulted, appreciate assistance  - Accuchecks Q6H as patient NPO  - Low dose SSI Q6H    Fluids/Electrolytes/Nutrition/GI:   - Replace electrolytes PRN  - Will hold TFs overnight  - Patient w/ significantly  elevated Alk phos and GGT  - Previous RUQ U/S negative for any acute process  - Ammonia elevated to 84 umol/L on admission to SICU  - Etiology of biliary disease remains unclear at this time    PPx:  - DVT: heparin 5000u subq Q8H  - Bowel: lactulose for 3-4 BMs/day  - PUP: Protonix 40 mg daily  - VAP: N/A    DISPO:  - Continue SICU care  - Somnolence w/ multifactorial etiology likely  - Lactulose for elevated ammonia, titrate to effect  - BiPAP for hypercapnia  - Trend lactic acid Q4H  - F/U blood cultures, respiratory cultures, urine cultures

## 2019-02-20 NOTE — CARE UPDATE
Report received from RN. Pt transported to SICU [id number] with portable telemetry and BiPAP in use. Pt connected to ICU monitor. ICU resident called and made aware of patient arrival. New orders received and implemented. Pt assessed, immediate needs met.      Admit Skin Note: Excoriation, skin tears across trunk, sacrum, and perineum. Wound care performed.

## 2019-02-20 NOTE — PT/OT/SLP PROGRESS
Speech Language Pathology    Sheryl Martines  MRN: [id number]    SLP Swallow Evaluation Orders received for re-assessment of swallow fx. Upon SLP attempt, Patient somnolent and on BiPAP. Patient reviewed with RT.  Patient not seen today secondary to respiratory status,  Will follow-up to monitor and re-attempt next service day. Thank you.    NAVID Cobb., [facility]-SLP  Speech-Language Pathology  Pager: [phone]  2/20/2019

## 2019-02-20 NOTE — H&P
[facility]  Critical Care - Surgery  History & Physical    Patient Name: Sheryl Martines  MRN: [id number]  Admission Date: 1/23/2019  Code Status: Full Code  Attending Physician: Monster Laurent MD   Primary Care Provider: Primary Doctor No   Principal Problem: Somnolence    Subjective:     HPI:  Sheryl Martines is a 63 y.o. female with  a significant PMHx of asthma, COPD, CAD, T2DM, HTN, CVA 2012 with residual right sided deficits, severe debility, lap converted to open appendectomy 08/2018 complicated by C. Diff infection, failure to thrive. Hx of difficult intubation and delayed emergence, hypercapnic respiratory failure needing ICU stay. Hx of PRN O2 at night. Denies smoking. Patient was originally admitted on  01/23/2019 via ED for abd pain, N/V, decreased appetite. Lactic acid at that time found to be 6.8, with tachycardia and WBC of 13. CT showed fluid collection in R hemipelvis.  Started on empiric vanc and Zosyn. Patient now s/p exploratory laparotomy, washout, and pelvic abscess drainage on 01/25/19.     Patient stepped down from the SICU on 02/08/19 after significant resuscitation, intubation, and CRRT. While on the floor patient had waxing and waning mental status which would typically improve w/ BiPAP. Over the last 48 hours patient's mentation has continued to worsen. CT head was obtained 02/18/19 which demonstrated no acute processes. Rapid response called on 02/19/19 for continued deterioration of mental status, only withdrawing to pain. Patient was stepped up to the ICU for closer monitoring and further investigation.    Hospital/ICU Course:  02/19/19: Arrived to the SICU on BiPAP after rapid response was called to patient's room (1007). At that time patient was minimally interactive.    Follow-up For: Procedure(s) (LRB):  LAPAROTOMY, EXPLORATORY (N/A)  INCISION AND DRAINAGE, ABSCESS-  drainage of pelvic abscess (N/A)  EXCISION, ABSCESS- drainage abdominal wall abscess  (N/A)  APPLICATION, WOUND VAC (N/A)    Post-Operative Day: 25 Days Post-Op     Past Medical History:   Diagnosis Date    Abnormal LFTs 12/14/2018    Asthma     Closed compression fracture of fourth lumbar vertebra     COPD (chronic obstructive pulmonary disease)     Coronary artery disease     Diabetes mellitus     Fall 10/4/2018    Fracture     right tib & fib    Glaucoma     High cholesterol     Hypertension     Infected incision 9/20/2018    Intractable nausea and vomiting 1/23/2019    Iritis     Pulmonary embolus     S/P appendectomy 9/11/2018    Stroke     rt sided weakness.       Past Surgical History:   Procedure Laterality Date    ABDOMINAL SURGERY      APPENDECTOMY N/A 8/26/2018    Performed by [facility]ad[facility]endrez [facility] at [facility] OR    APPENDECTOMY, LAPAROSCOPIC---CONVERTED TO OPEN APPENDECTOMY @0950 N/A 8/26/2018    Performed by [facility] at [facility] OR    APPLICATION, WOUND VAC N/A 1/25/2019    [facility] at [facility] OR [address] [place] [address]    BACK SURGERY      stimulator    CATARACT EXTRACTION      EXCISION, ABSCESS- drainage abdominal wall abscess N/A 1/25/2019    Performed by Monster Laurent [facility] at [facility] OR [address] [place] [address]    HYSTERECTOMY      INCISION AND DRAINAGE, ABSCESS-  drainage of pelvic abscess N/A 1/25/2019    Performed by Monster Laurent [facility] at [facility] OR [address] [place] [address]    LAPAROTOMY, EXPLORATORY N/A 1/25/2019    [facility]    TOTAL HIP ARTHROPLASTY Left     2008     Continuous Infusions:     Scheduled Meds:    albuterol sulfate  2.5 mg Nebulization Q4H    aspirin  81 mg Oral Daily    clopidogrel  75 mg Oral Daily    diltiaZEM  180 mg Oral Daily    DULoxetine  60 mg Oral Daily    fluticasone-vilanterol  1 puff Inhalation Daily    furosemide  40 mg Oral BID    heparin (porcine)  5,000 Units Subcutaneous Q8H    indomethacin  75 mg Oral BID    lactulose  20 g Per G Tube BID    miconazole nitrate 2%   Topical  (Top) BID    pantoprozole (PROTONIX) IV  40 mg Intravenous Q12H    piperacillin-tazobactam (ZOSYN) IVPB  4.5 g Intravenous Q8H    [START ON 2/20/2019] predniSONE 5 mg/5 ml  10 mg Per G Tube Daily    senna-docusate 8.6-50 mg  1 tablet Per G Tube BID    sodium chloride 0.9%  10 mL Intravenous Q6H    vancomycin (VANCOCIN) IVPB  15 mg/kg Intravenous Q12H       PRN Meds: albuterol-ipratropium, dextrose 50%, diclofenac sodium, diphenhydrAMINE, glucagon (human recombinant), hydrALAZINE, insulin aspart U-100, omnipaque, omnipaque, ondansetron, promethazine (PHENERGAN) IVPB, Flushing PICC Protocol **AND** sodium chloride 0.9% **AND** sodium chloride 0.9%, sodium chloride 0.9%, sodium chloride 0.9%      Review of patient's allergies indicates:   Allergen Reactions    Ace inhibitors Swelling    Carvedilol      Other reaction(s): Other (See Comments)  blister    Hydralazine analogues     Metformin Nausea And Vomiting    Tetracycline Itching    Tetracyclines Swelling    Travatan (with benzalkonium) [travoprost (benzalkonium)]     Travoprost Itching     Other reaction(s): Other (See Comments)  Blurry vision       Family History     Problem Relation (Age of Onset)    Cancer Father    Diabetes Brother    Lupus Sister    Multiple sclerosis Mother        Tobacco Use    Smoking status: Never Smoker    Smokeless tobacco: Never Used   Substance and Sexual Activity    Alcohol use: No     Frequency: Never    Drug use: No    Sexual activity: No     Partners: Male      Review of Systems   Reason unable to perform ROS: Altered mental status.     Objective:     Vital Signs (Most Recent):  Temp: 98.8 °F (37.1 °C) (02/19/19 1615)  Pulse: 100 (02/19/19 1800)  Resp: (!) 22 (02/19/19 1800)  BP: 96/65 (02/19/19 1800)  SpO2: 98 % (02/19/19 1800) Vital Signs (24h Range):  Temp:  [97.5 °F (36.4 °C)-99.8 °F (37.7 °C)] 98.8 °F (37.1 °C)  Pulse:  [] 100  Resp:  [15-44] 22  SpO2:  [79 %-100 %] 98 %  BP: ()/(51-65) 96/65      Weight: 75.4 kg (166 lb 3.6 oz)  Body mass index is 31.41 kg/m².      Intake/Output Summary (Last 24 hours) at 2/19/2019 1827  Last data filed at 2/19/2019 1800  Gross per 24 hour   Intake 1250 ml   Output 637 ml   Net 613 ml       Physical Exam   Nursing note and vitals reviewed.  General: well developed, in no acute distress  HEENT: NC/AT, eyes normal, ears normal, nares midline no deviation, trachea midline  Lungs: clear to auscultation bilaterally, no additional sounds  Chest: RRR, S1/S2 normal, no rubs, murmurs, gallops  Abdomen: soft, not distended, not tender, no masses, no hernias, no striae  MSK: normal ROM in all extremities   Skin: no rashes, burns, bruises, or petechiae  Neuro: CNs grossly intact, no obvious sensory or motor deficits  Psych: not responding to questions, withdrawing to pain    Vents: BiPAP 15/8 @ 35% FiO2    Lines/Drains/Airways     Central Venous Catheter Line                 Port A Cath Single Lumen 02/17/19 0930 right subclavian 2 days          Drain                 Gastrostomy/Enterostomy 02/11/19 LUQ 8 days         Urethral Catheter 02/17/19 1507 Straight-tip 16 Fr. 2 days                Significant Labs:    CBC/Anemia Profile:  Recent Labs   Lab 02/19/19  0042 02/19/19  0402 02/19/19  1441   WBC 11.28 11.24 14.06*   HGB 7.8* 8.2* 8.1*   HCT 27.4* 27.0* 27.7*   * 110* 146*   MCV 95 93 96   RDW 17.5* 17.4* 17.4*        Chemistries:  Recent Labs   Lab 02/18/19  0410  02/18/19  1527 02/19/19  0042 02/19/19  0402 02/19/19  1159 02/19/19  1441   *   < >  --  146* 144  --  146*   K 3.9   < >  --  3.6 3.5 4.1 4.0      < >  --  102 101  --  105   CO2 39*   < >  --  40* 39*  --  36*   BUN 9   < >  --  9 9  --  8   CREATININE 0.8   < >  --  0.8 0.8  --  0.8   CALCIUM 9.9   < >  --  10.2 10.1  --  10.0   ALBUMIN 1.3*   < >  --  1.4* 1.4*  --  1.4*   PROT 5.4*   < >  --  5.4* 5.6*  --  5.5*   BILITOT 3.6*   < >  --  3.9* 4.0*  --  4.1*   ALKPHOS 1,568*   < >  --  1,511*  1,553*  --  1,648*   ALT 28   < >  --  24 24  --  23   *   < >  --  93* 95*  --  93*   MG 0.9*  --  1.6  --  1.8  --  2.0   PHOS 3.0  --   --   --  2.8  --  2.3*    < > = values in this interval not displayed.       ABGs:   Recent Labs   Lab 02/19/19  1454   PH 7.453*   PCO2 52.4*   HCO3 36.7*   POCSATURATED 93*   BE 13     Bilirubin:   Recent Labs   Lab 02/18/19  0410 02/18/19  1208 02/19/19  0042 02/19/19  0402 02/19/19  1441   BILITOT 3.6* 3.7* 3.9* 4.0* 4.1*     Blood Culture: No results for input(s): LABBLOO in the last 48 hours.  Coagulation:   Recent Labs   Lab 02/18/19  1208 02/19/19  1441   INR 1.2 1.1   APTT 32.3*  --      Lactic Acid:   Recent Labs   Lab 02/18/19  0639 02/19/19  1441   LACTATE 1.4 2.6*     POCT Glucose:   Recent Labs   Lab 02/19/19  1028 02/19/19  1426 02/19/19  1726   POCTGLUCOSE 182* 168* 190*     Urine Culture: No results for input(s): LABURIN in the last 48 hours.    Significant Imaging: I have reviewed all pertinent imaging results/findings within the past 24 hours.    Assessment/Plan:     * Somnolence    63 y.o. female w/ a significant PMHx of COPD, CAD, T2DM, CKD, HTN, CVA w/ residual right sided deficits, significant debility, and recent exploratory laparotomy for pelvis mass/abscess that required an ICU admission postoperatively. Patient stepped back up to the SICU 02/19/19 s/p rapid response for somnolence and altered mental status.    Neuro:   - Patient minimally interactive  - Responds to repeated stimulation  - CT Head negative for any acute processes  - Not showing any focal neurological deficits  - Ammonia level of 84 umol/L at time of RR  - Lactulose 20g per gtube, titrating to 3-4 loose BMs/day    Pulmonary:   - Patient w/ a Hx of hypercapnic respiratory failure on this admission requiring intubation  - Currently on BiPAP 15/8 @ 35% FiO2  - Low threshold to intubate given Hx and mental status  - CXR daily  - ABGs PRN  - Started on empiric broad spectrum Abx  -  Questionable RUL opacification concerning for developing PNA    Cardiac:   - Currently HDS, not requiring any pressors  - Hx of CAD and HTN  - Continuing home meds    Renal:    - Questionable Hx of CKD  - No acute issues on this admission  - Currently on Lasix 40 mg PO BID, will hold for now  - Continue nieves for strict I/Os  - Trend BUN/Cr     ID:   - Afebrile over last 24H  - New onset leukocytosis of 14.06   - Questionable new RUL opacification on CT chest  - Started on empiric broad spectrum Abx given clinical picture  - Mild lactic acidosis of 2.6, trending Q4H  - Trend WBC w/ daily CBCs     Hem/Onc:   - H/H trending down over the last 24H  - Currently HDS w/ no signs of active bleeding  - CT abdomen/pelvis 02/18/19 w/ no acute processes  - Trend H/H w/ daily CBCs  - Transfuse for Hgb < 7.0 g/dL    Endocrine:    - Hx of T2DM  - Endocrine consulted, appreciate assistance  - Accuchecks Q6H as patient NPO  - Low dose SSI Q6H    Fluids/Electrolytes/Nutrition/GI:   - Replace electrolytes PRN  - Will hold TFs overnight  - Patient w/ significantly elevated Alk phos and GGT  - Previous RUQ U/S negative for any acute process  - Ammonia elevated to 84 umol/L on admission to SICU  - Etiology of biliary disease remains unclear at this time    PPx:  - DVT: heparin 5000u subq Q8H  - Bowel: lactulose for 3-4 BMs/day  - PUP: Protonix 40 mg daily  - VAP: N/A    DISPO:  - Continue SICU care  - Somnolence w/ multifactorial etiology likely  - Lactulose for elevated ammonia, titrate to effect  - BiPAP for hypercapnia  - Trend lactic acid Q4H  - F/U blood cultures, respiratory cultures, urine cultures        Critical care was time spent personally by me on the following activities: development of treatment plan with patient or surrogate and bedside caregivers, discussions with consultants, evaluation of patient's response to treatment, examination of patient, ordering and performing treatments and interventions, ordering and review  of laboratory studies, ordering and review of radiographic studies, pulse oximetry, re-evaluation of patient's condition.  This critical care time did not overlap with that of any other provider or involve time for any procedures.     Camron Mak MD  Critical Care - Surgery  [facility]

## 2019-02-20 NOTE — ASSESSMENT & PLAN NOTE
BG goal 140 - 180   Currently NPO, on D5 +1/2 NS @75cc/hr  On steroids as well which may elevated BG  C/w low dose correction scale  BG monitoring q 4 hours

## 2019-02-20 NOTE — ASSESSMENT & PLAN NOTE
-LFTs, Tbili and Alk phos all increased  -CT showed biliary sludge  -consult hepatology  - obtain RUQ US - may consider HIDA in the future but currently not stable enough from a respiratory standpoint to go down for it

## 2019-02-20 NOTE — PLAN OF CARE
"Problem: Adult Inpatient Plan of Care  Goal: Plan of Care Review  Outcome: Ongoing (interventions implemented as appropriate)  Dx: pelvic abcess POD 26 s/p ex-lap     Shift Events: Liver U/S performed, lactate measured q4hr - 1 L LR given over AM shift.     Neuro: opens eyes to voice, moans incoherently, unable to follow commands      Vital Signs: /64   Pulse 73   Temp 97.8 °F (36.6 °C)   Resp (!) 28   Ht 5' 1" (1.549 m)   Wt 77.1 kg (170 lb)   LMP  (LMP Unknown)   SpO2 98%   Breastfeeding? No   BMI 32.12 kg/m²      Intake/Gtts/Diet: NPO     Output: UOP 5-10 cc/hr.     Labs: Accuchecks Q4, pt not requiring SSI throughout shift.     Skin: Excoriated, skin tears on trunk and sacrum and perineum. Wound care performed per WOCN. Wound on RLQ, packed with wet to dry. Midline incision scabbed, edges approximated, staples intact.       "

## 2019-02-20 NOTE — ASSESSMENT & PLAN NOTE
63 y.o. female w/ a significant PMHx of COPD, CAD, T2DM, CKD, HTN, CVA w/ residual right sided deficits, significant debility, and recent exploratory laparotomy for pelvis mass/abscess that required an ICU admission postoperatively. Patient stepped back up to the SICU 02/19/19 s/p rapid response for somnolence and altered mental status.    Neuro:   - Patient minimally interactive  - Responds to repeated stimulation  - CT Head negative for any acute processes  - Not showing any focal neurological deficits  - Ammonia level of 84 umol/L at time of RR   - Trending down to 58 umol/L  - Lactulose 20g per gtube, titrating to 3-4 loose BMs/day    Pulmonary:   - Patient w/ a Hx of hypercapnic respiratory failure on this admission requiring intubation  - Hx of difficult intubation  - Currently on BiPAP 15/5 @ 40% FiO2  - Low threshold to intubate given Hx and mental status  - CXR daily  - ABGs PRN  - Started on empiric broad spectrum Abx  - Questionable RUL opacification concerning for developing PNA    Cardiac:   - Currently HDS, not requiring any pressors  - Hx of CAD and HTN  - Continuing home meds    Renal:    - Questionable Hx of CKD  - No acute issues on this admission  - Currently on Lasix 40 mg PO BID, will hold for now  - Continue nieves for strict I/Os  - Trend BUN/Cr  - UA dirty, Urine Cx pending     ID:   - Afebrile over last 24H  - Leukocytosis of 17.59  - Questionable new RUL opacification on CT chest  - Started on empiric broad spectrum Abx given clinical picture  - Lactic acidosis peaked at 3.0, trending Q4H  - Received 3L LR yesterday, 250 cc albumin  - Dirty UA, urine Cx sent  - Trend WBC w/ daily CBCs    Hem/Onc:   - H/H trending down over the last 24H  - Currently HDS w/ no signs of active bleeding  - CT abdomen/pelvis 02/18/19 w/ no acute processes  - Trend H/H w/ daily CBCs  - Transfuse for Hgb < 7.0 g/dL    Endocrine:    - Hx of T2DM  - Endocrine consulted, appreciate assistance  - Accuchecks Q6H as  patient NPO  - Low dose SSI Q6H  - Elevated TSH w/ low free T4    Fluids/Electrolytes/Nutrition/GI:   - Replace electrolytes PRN  - Will hold TFs overnight  - Patient w/ significantly elevated Alk phos and GGT  - Previous RUQ U/S negative for any acute process  - Ammonia elevated to 84 umol/L on admission to SICU   - Trending down to 54 umol/L  - Etiology of biliary disease remains unclear at this time    PPx:  - DVT: heparin 5000u subq Q8H  - Bowel: lactulose for 3-4 BMs/day  - PUP: Protonix 40 mg daily  - VAP: N/A    DISPO:  - Continue SICU care  - Somnolence w/ multifactorial etiology likely  - Lactulose for elevated ammonia, titrate to effect/BMs  - BiPAP for hypercapnia  - Trend lactic acid Q4H  - F/U blood cultures, respiratory cultures, urine cultures

## 2019-02-20 NOTE — SUBJECTIVE & OBJECTIVE
Follow-up For: Procedure(s) (LRB):  LAPAROTOMY, EXPLORATORY (N/A)  INCISION AND DRAINAGE, ABSCESS-  drainage of pelvic abscess (N/A)  EXCISION, ABSCESS- drainage abdominal wall abscess (N/A)  APPLICATION, WOUND VAC (N/A)    Post-Operative Day: 25 Days Post-Op     Past Medical History:   Diagnosis Date    Abnormal LFTs 12/14/2018    Asthma     Closed compression fracture of fourth lumbar vertebra     COPD (chronic obstructive pulmonary disease)     Coronary artery disease     Diabetes mellitus     Fall 10/4/2018    Fracture     right tib & fib    Glaucoma     High cholesterol     Hypertension     Infected incision 9/20/2018    Intractable nausea and vomiting 1/23/2019    Iritis     Pulmonary embolus     S/P appendectomy 9/11/2018    Stroke     rt sided weakness.       Past Surgical History:   Procedure Laterality Date    ABDOMINAL SURGERY      APPENDECTOMY N/A 8/26/2018    Performed by [facility]rez [facility] at [facility] OR    APPENDECTOMY, LAPAROSCOPIC---CONVERTED TO OPEN APPENDECTOMY @0950 N/A 8/26/2018    [facility] at [facility] OR    APPLICATION, WOUND VAC N/A 1/25/2019    Per[facility] by Monster Laurent [facility] at [facility] OR [address] [place] [address]    BACK SURGERY      stimulator    CATARACT EXTRACTION      EXCISION, ABSCESS- drainage abdominal wall abscess N/A 1/25/2019    Performed by Monster Laurent MD at [facility] OR [address] [place] [address]    HYSTERECTOMY      INCISION AND DRAINAGE, ABSCESS-  drainage of pelvic abscess N/A 1/25/2019    Performed by Monsetr Laurent MD at [facility] OR [address] [place] [address]    LAPAROTOMY, EXPLORATORY N/A 1/25/2019    Performed by Monster Laurent MD at [facility] OR [address] [place] [address]    TOTAL HIP ARTHROPLASTY Left     2008     Continuous Infusions:     Scheduled Meds:    albuterol sulfate  2.5 mg Nebulization Q4H    aspirin  81 mg Oral Daily    clopidogrel  75 mg Oral Daily    diltiaZEM  180 mg Oral Daily    DULoxetine  60 mg Oral Daily    fluticasone-vilanterol  1 puff Inhalation Daily     furosemide  40 mg Oral BID    heparin (porcine)  5,000 Units Subcutaneous Q8H    indomethacin  75 mg Oral BID    lactulose  20 g Per G Tube BID    miconazole nitrate 2%   Topical (Top) BID    pantoprozole (PROTONIX) IV  40 mg Intravenous Q12H    piperacillin-tazobactam (ZOSYN) IVPB  4.5 g Intravenous Q8H    [START ON 2/20/2019] predniSONE 5 mg/5 ml  10 mg Per G Tube Daily    senna-docusate 8.6-50 mg  1 tablet Per G Tube BID    sodium chloride 0.9%  10 mL Intravenous Q6H    vancomycin (VANCOCIN) IVPB  15 mg/kg Intravenous Q12H       PRN Meds: albuterol-ipratropium, dextrose 50%, diclofenac sodium, diphenhydrAMINE, glucagon (human recombinant), hydrALAZINE, insulin aspart U-100, omnipaque, omnipaque, ondansetron, promethazine (PHENERGAN) IVPB, Flushing PICC Protocol **AND** sodium chloride 0.9% **AND** sodium chloride 0.9%, sodium chloride 0.9%, sodium chloride 0.9%      Review of patient's allergies indicates:   Allergen Reactions    Ace inhibitors Swelling    Carvedilol      Other reaction(s): Other (See Comments)  blister    Hydralazine analogues     Metformin Nausea And Vomiting    Tetracycline Itching    Tetracyclines Swelling    Travatan (with benzalkonium) [travoprost (benzalkonium)]     Travoprost Itching     Other reaction(s): Other (See Comments)  Blurry vision       Family History     Problem Relation (Age of Onset)    Cancer Father    Diabetes Brother    Lupus Sister    Multiple sclerosis Mother        Tobacco Use    Smoking status: Never Smoker    Smokeless tobacco: Never Used   Substance and Sexual Activity    Alcohol use: No     Frequency: Never    Drug use: No    Sexual activity: No     Partners: Male      Review of Systems   Reason unable to perform ROS: Altered mental status.     Objective:     Vital Signs (Most Recent):  Temp: 98.8 °F (37.1 °C) (02/19/19 1615)  Pulse: 100 (02/19/19 1800)  Resp: (!) 22 (02/19/19 1800)  BP: 96/65 (02/19/19 1800)  SpO2: 98 % (02/19/19 1800)  Vital Signs (24h Range):  Temp:  [97.5 °F (36.4 °C)-99.8 °F (37.7 °C)] 98.8 °F (37.1 °C)  Pulse:  [] 100  Resp:  [15-44] 22  SpO2:  [79 %-100 %] 98 %  BP: ()/(51-65) 96/65     Weight: 75.4 kg (166 lb 3.6 oz)  Body mass index is 31.41 kg/m².      Intake/Output Summary (Last 24 hours) at 2/19/2019 1827  Last data filed at 2/19/2019 1800  Gross per 24 hour   Intake 1250 ml   Output 637 ml   Net 613 ml       Physical Exam   Nursing note and vitals reviewed.  General: well developed, in no acute distress  HEENT: NC/AT, eyes normal, ears normal, nares midline no deviation, trachea midline  Lungs: clear to auscultation bilaterally, no additional sounds  Chest: RRR, S1/S2 normal, no rubs, murmurs, gallops  Abdomen: soft, not distended, not tender, no masses, no hernias, no striae  MSK: normal ROM in all extremities   Skin: no rashes, burns, bruises, or petechiae  Neuro: CNs grossly intact, no obvious sensory or motor deficits  Psych: not responding to questions, withdrawing to pain    Vents: BiPAP 15/8 @ 35% FiO2    Lines/Drains/Airways     Central Venous Catheter Line                 Port A Cath Single Lumen 02/17/19 0930 right subclavian 2 days          Drain                 Gastrostomy/Enterostomy 02/11/19 LUQ 8 days         Urethral Catheter 02/17/19 1507 Straight-tip 16 Fr. 2 days                Significant Labs:    CBC/Anemia Profile:  Recent Labs   Lab 02/19/19  0042 02/19/19  0402 02/19/19  1441   WBC 11.28 11.24 14.06*   HGB 7.8* 8.2* 8.1*   HCT 27.4* 27.0* 27.7*   * 110* 146*   MCV 95 93 96   RDW 17.5* 17.4* 17.4*        Chemistries:  Recent Labs   Lab 02/18/19  0410  02/18/19  1527 02/19/19  0042 02/19/19  0402 02/19/19  1159 02/19/19  1441   *   < >  --  146* 144  --  146*   K 3.9   < >  --  3.6 3.5 4.1 4.0      < >  --  102 101  --  105   CO2 39*   < >  --  40* 39*  --  36*   BUN 9   < >  --  9 9  --  8   CREATININE 0.8   < >  --  0.8 0.8  --  0.8   CALCIUM 9.9   < >  --  10.2  10.1  --  10.0   ALBUMIN 1.3*   < >  --  1.4* 1.4*  --  1.4*   PROT 5.4*   < >  --  5.4* 5.6*  --  5.5*   BILITOT 3.6*   < >  --  3.9* 4.0*  --  4.1*   ALKPHOS 1,568*   < >  --  1,511* 1,553*  --  1,648*   ALT 28   < >  --  24 24  --  23   *   < >  --  93* 95*  --  93*   MG 0.9*  --  1.6  --  1.8  --  2.0   PHOS 3.0  --   --   --  2.8  --  2.3*    < > = values in this interval not displayed.       ABGs:   Recent Labs   Lab 02/19/19  1454   PH 7.453*   PCO2 52.4*   HCO3 36.7*   POCSATURATED 93*   BE 13     Bilirubin:   Recent Labs   Lab 02/18/19  0410 02/18/19  1208 02/19/19  0042 02/19/19  0402 02/19/19  1441   BILITOT 3.6* 3.7* 3.9* 4.0* 4.1*     Blood Culture: No results for input(s): LABBLOO in the last 48 hours.  Coagulation:   Recent Labs   Lab 02/18/19  1208 02/19/19  1441   INR 1.2 1.1   APTT 32.3*  --      Lactic Acid:   Recent Labs   Lab 02/18/19  0639 02/19/19  1441   LACTATE 1.4 2.6*     POCT Glucose:   Recent Labs   Lab 02/19/19  1028 02/19/19  1426 02/19/19  1726   POCTGLUCOSE 182* 168* 190*     Urine Culture: No results for input(s): LABURIN in the last 48 hours.    Significant Imaging: I have reviewed all pertinent imaging results/findings within the past 24 hours.

## 2019-02-20 NOTE — ASSESSMENT & PLAN NOTE
Last echo 8/2018 with no WMAs, grade 1DD, EF 60%  -Strict I/O, daily weights  -holding diuresis today

## 2019-02-20 NOTE — PT/OT/SLP PROGRESS
Speech Language Pathology  Discharge Summary    Sheryl Martines  MRN: [id number]    Patient with change in status and transferred to ICU. ST to discharge orders. Patient will need new orders to continue Dysphagia therapy. Please reconsult when appropriate. Thank you.     NAVID Cobb., [facility]-SLP  Speech-Language Pathology  Pager: [phone]  2/20/2019

## 2019-02-20 NOTE — SUBJECTIVE & OBJECTIVE
Review of Systems   Unable to perform ROS: Mental status change       Past Medical History:   Diagnosis Date    Abnormal LFTs 12/14/2018    Asthma     Closed compression fracture of fourth lumbar vertebra     COPD (chronic obstructive pulmonary disease)     Coronary artery disease     Diabetes mellitus     Fall 10/4/2018    Fracture     right tib & fib    Glaucoma     High cholesterol     Hypertension     Infected incision 9/20/2018    Intractable nausea and vomiting 1/23/2019    Iritis     Pulmonary embolus     S/P appendectomy 9/11/2018    Stroke     rt sided weakness.       Past Surgical History:   Procedure Laterality Date    ABDOMINAL SURGERY      APPENDECTOMY N/A 8/26/2018    Performed by [facility] at [facility] OR    APPENDECTOMY, LAPAROSCOPIC---CONVERTED TO OPEN APPENDECTOMY @0950 N/A 8/26/2018    Per[facility] at [facility] OR    APPLICATION, WOUND VAC N/A 1/25/2019    Performed by Monster Laurent MD at [facility] OR [address] [place] [address]    BACK SURGERY      stimulator    CATARACT EXTRACTION      EXCISION, ABSCESS- drainage abdominal wall abscess N/A 1/25/2019    Performed by Monster Laurent MD at [facility] OR [address] [place] [address]    HYSTERECTOMY      INCISION AND DRAINAGE, ABSCESS-  drainage of pelvic abscess N/A 1/25/2019    Performed by Monster Laurent MD at [facility] OR [address] [place] [address]    LAPAROTOMY, EXPLORATORY N/A 1/25/2019    Performed by Monster Laurent MD at [facility] OR [address] [place] [address]    TOTAL HIP ARTHROPLASTY Left     2008         Review of patient's allergies indicates:   Allergen Reactions    Ace inhibitors Swelling    Carvedilol      Other reaction(s): Other (See Comments)  blister    Hydralazine analogues     Metformin Nausea And Vomiting    Tetracycline Itching    Tetracyclines Swelling    Travatan (with benzalkonium) [travoprost (benzalkonium)]     Travoprost Itching     Other reaction(s): Other (See Comments)  Blurry vision       Tobacco Use    Smoking status: Never  Smoker    Smokeless tobacco: Never Used   Substance and Sexual Activity    Alcohol use: No     Frequency: Never    Drug use: No    Sexual activity: No     Partners: Male       Medications Prior to Admission   Medication Sig Dispense Refill Last Dose    acetaminophen (TYLENOL) 500 MG tablet Take 1,000 mg by mouth 2 (two) times daily as needed for Pain.   1/22/2019    albuterol (PROVENTIL/VENTOLIN HFA) 90 mcg/actuation inhaler Inhale 2 puffs into the lungs every 6 (six) hours as needed for Wheezing or Shortness of Breath. Rescue   1/22/2019    albuterol-ipratropium (DUO-NEB) 2.5 mg-0.5 mg/3 mL nebulizer solution Take 3 mLs by nebulization every 4 (four) hours as needed for Wheezing or Shortness of Breath. Rescue    1/22/2019    aspirin (ECOTRIN) 81 MG EC tablet Take 1 tablet (81 mg total) by mouth once daily. 30 tablet 11 1/22/2019    baclofen (LIORESAL) 10 MG tablet Take 10 mg by mouth 2 (two) times daily as needed (MUSCLE SPASMS).   1/22/2019    cefdinir (OMNICEF) 300 MG capsule TK ONE C PO  BID FOR 7 DAYS  0 1/22/2019    cephALEXin (KEFLEX) 750 MG capsule TK ONE C PO TID FOR 5 DAYS  0 1/22/2019    ciprofloxacin HCl (CIPRO) 250 MG tablet Take 1 tablet (250 mg total) by mouth every 12 (twelve) hours. Starting 12/15 evening 5 tablet 0 1/22/2019    clopidogrel (PLAVIX) 75 mg tablet Take 75 mg by mouth once daily.   1/22/2019    diclofenac sodium (VOLTAREN) 1 % Gel Apply 2 g topically daily as needed (PAIN).   1/22/2019    diltiaZEM (CARDIZEM CD) 180 MG 24 hr capsule Take 180 mg by mouth once daily.   1/22/2019    DULoxetine (CYMBALTA) 60 MG capsule Take 60 mg by mouth once daily.   1/22/2019    fluticasone-vilanterol (BREO ELLIPTA) 100-25 mcg/dose diskus inhaler Inhale 1 puff into the lungs once daily. Controller   1/22/2019    gabapentin (NEURONTIN) 300 MG capsule Take 300 mg by mouth once daily.   1/22/2019    Lactobacillus rhamnosus-fiber 2.5 billion cell-3.5 gram PwPk Take 1 capsule by mouth.    1/22/2019    lansoprazole (PREVACID) 30 MG capsule Take 30 mg by mouth once daily.   1/22/2019    losartan (COZAAR) 100 MG tablet Take 100 mg by mouth once daily.    1/22/2019    ondansetron (ZOFRAN) 4 MG tablet Take 1 tablet (4 mg total) by mouth every 6 (six) hours as needed. 30 tablet 1 1/22/2019    ondansetron (ZOFRAN) 4 MG tablet Take 4-8 mg by mouth.   1/22/2019    potassium chloride SA (K-DUR,KLOR-CON) 10 MEQ tablet Take 10 mEq by mouth.   1/22/2019    predniSONE (DELTASONE) 10 MG tablet Take 4 tablets (40 mg) on 12/16, then take 1 tablet (10 mg) daily 30 tablet 0 1/22/2019    pyridoxine, vitamin B6, (VITAMIN B-6) 50 MG Tab Take 1 tablet (50 mg total) by mouth once daily.  0 1/22/2019    simvastatin (ZOCOR) 40 MG tablet Take 40 mg by mouth.   1/22/2019    theophylline (THEODUR) 300 mg 24 hr capsule Take 300 mg by mouth once daily.   1/22/2019    traMADol (ULTRAM) 50 mg tablet TK 1 T PO BID PRN  0 1/22/2019    [DISCONTINUED] baclofen (LIORESAL) 10 MG tablet Take 10 mg by mouth.   1/22/2019    [DISCONTINUED] furosemide (LASIX) 40 MG tablet Take 40 mg by mouth once daily.    1/22/2019    [DISCONTINUED] HYDROcodone-acetaminophen (NORCO) 5-325 mg per tablet Take 1 tablet by mouth every 4 to 6 hours as needed. 42 tablet 0 1/22/2019    [DISCONTINUED] prazosin (MINIPRESS) 5 MG capsule TK 1 C PO BID  13 1/22/2019       Objective:     Vital Signs (Most Recent):  Temp: 97.8 °F (36.6 °C) (02/20/19 1500)  Pulse: 82 (02/20/19 1500)  Resp: 17 (02/20/19 1500)  BP: 124/69 (02/20/19 1500)  SpO2: 96 % (02/20/19 1500) Vital Signs (24h Range):  Temp:  [97.8 °F (36.6 °C)-99.1 °F (37.3 °C)] 97.8 °F (36.6 °C)  Pulse:  [] 82  Resp:  [12-44] 17  SpO2:  [79 %-100 %] 96 %  BP: ()/(51-96) 124/69     Weight: 77.1 kg (170 lb) (02/20/19 0500)  Body mass index is 32.12 kg/m².    Physical Exam   Constitutional: She appears lethargic. She is sleeping. She has a sickly appearance. She appears distressed.   BiPAP on  place   HENT:   Head: Normocephalic.   Eyes: Conjunctivae are normal. Scleral icterus is present.   Neck: Normal range of motion. Neck supple.   Cardiovascular: Regular rhythm.   Pulmonary/Chest: She has rales.   Abdominal: Soft. Bowel sounds are normal. She exhibits distension. She exhibits no mass. There is tenderness. There is no rebound and no guarding.   PEG tube in place   Musculoskeletal: Normal range of motion.   Neurological: She appears lethargic.   Skin: Skin is warm and dry.       MELD-Na score: 13 at 2/20/2019  3:00 AM  MELD score: 13 at 2/20/2019  3:00 AM  Calculated from:  Serum Creatinine: 0.8 mg/dL (Rounded to 1 mg/dL) at 2/20/2019  3:00 AM  Serum Sodium: 146 mmol/L (Rounded to 137 mmol/L) at 2/20/2019  3:00 AM  Total Bilirubin: 4.8 mg/dL at 2/20/2019  3:00 AM  INR(ratio): 1.1 at 2/19/2019  2:41 PM  Age: 63 years    Significant Labs:  CBC:   Recent Labs   Lab 02/20/19  0300   WBC 17.59*   RBC 2.90*   HGB 8.1*   HCT 27.5*        BMP:   Recent Labs   Lab 02/20/19  0300   *   *   K 4.0      CO2 35*   BUN 8   CREATININE 0.8   CALCIUM 10.4     CMP:   Recent Labs   Lab 02/20/19  0300   *   CALCIUM 10.4   ALBUMIN 1.4*   PROT 5.6*   *   K 4.0   CO2 35*      BUN 8   CREATININE 0.8   ALKPHOS 1,747*   ALT 23   *   BILITOT 4.8*     Coagulation:   Recent Labs   Lab 02/18/19  1208 02/19/19  1441   INR 1.2 1.1   APTT 32.3*  --        Significant Imaging:  Labs: Reviewed  US: Reviewed  CT: Reviewed

## 2019-02-20 NOTE — PLAN OF CARE
Problem: Adult Inpatient Plan of Care  Goal: Plan of Care Review  Outcome: Ongoing (interventions implemented as appropriate)  Pt vitals stable overnight. Pt not responding to commands, continuous moaning. Lactic monitored q4h, 1.5L LR and 250 albumin given. Pt urine output minimal, tea colored. G tube to gravity with minimal output. UA and culture sent. PT skin excoriated with numerous skin tears and wounds. Pt turned q2h to prevent further breakdown. MDs aware of all current labs, vitals, and output. POC reviewed with pt and daughter (over phone). RN at  to monitor and answer all questions. See flowsheets and notes for full assessment details.

## 2019-02-20 NOTE — PLAN OF CARE
"Problem: Adult Inpatient Plan of Care  Goal: Plan of Care Review  Outcome: Ongoing (interventions implemented as appropriate)  Dx: pelvic abcess    Shift Events: pt. Admitted to SICU at 16:15 for AMS.    Neuro: Unresponsive    Vital Signs: BP 90/62 (BP Location: Left arm, Patient Position: Lying) Comment: Simultaneous filing. User may not have seen previous data.  Pulse 97   Temp 98.2 °F (36.8 °C) (Axillary)   Resp (!) 28   Ht 5' 1" (1.549 m)   Wt 75.4 kg (166 lb 3.6 oz)   LMP  (LMP Unknown)   SpO2 98%   Breastfeeding? No   BMI 31.41 kg/m²     Intake/Gtts/Diet: NPO    Output: Urine output 200 upon admit, 12 cc/hr after. G tube to gravity, no output.      Labs: Accuchecks Q4    Skin: Excoriated, skin tears on trunk and sacrum and perineum. Wound care performed. Wound on RLQ, packed with wet to dry. Midline incision, scabbed.          "

## 2019-02-20 NOTE — ASSESSMENT & PLAN NOTE
Cortisol 4.2, albumin 1.4 (low albumin underestimates cortisol level & therefore makes results inaccurate)  Adrenal insufficiency unlikely given patient's clinical signs/symptoms and clinical picture  Would stop steroids especially in the setting of infection

## 2019-02-20 NOTE — PROGRESS NOTES
"[facility]  General Surgery  Progress Note    Subjective:     History of Present Illness:  64 yo W with a history of HTN, COPD on home oxygen, HFpEF, IDDMII, CVA on plavix, HTN, debility after fall and broken hip, and PE who presents to the ED with a several month history of nausea, vomiting, inability to tolerate a diet and weight loss. She has had multiple admissions in the past few months for different ailments. She presents today with continued nausea, vomiting and abdominal pain that is mostly worse in the RLQ. During the examination, she states her pain was all over her abdomen because she was retching so much. She states that she has lost close to 40lbs since her surgery and that she only able to keep broth down. She had a lap converted to open appendectomy back in August 2018 that was complicated by a wound infection, C diff and failure to thrive. This seems to persists. She is now wheelchair bound (per her) and apparently lives in [place] but is here in [place] with her daughter.    She had a CT scan in the ED which revealed an irregular shaped rim enhancing fluid collection measuring at least 4.0 x 3.0 cm ight hemipelvis at the site of prior appendectomy. Surgery was consulted for further recommendations. She was also noted to have a "knot" at the RLQ incision site that is also tender.    Post-Op Info:  Procedure(s) (LRB):  LAPAROTOMY, EXPLORATORY (N/A)  INCISION AND DRAINAGE, ABSCESS-  drainage of pelvic abscess (N/A)  EXCISION, ABSCESS- drainage abdominal wall abscess (N/A)  APPLICATION, WOUND VAC (N/A)   26 Days Post-Op     Interval History:   Transferred to SICU yesterday after rapid response called for decreased responsiveness. Ammonia down to 58 from 90s, leukocytosis this morning and Alk phos/LFTs continue to be increased with Alk phos at 1700 today. CO2 still elevated on gas but improved (57).    Medications:  Continuous Infusions:   dextrose 5 % and 0.45 % NaCl with KCl 20 " mEq 75 mL/hr at 02/20/19 1300     Scheduled Meds:   albuterol-ipratropium  3 mL Nebulization Q4H WAKE    aspirin  81 mg Oral Daily    clopidogrel  75 mg Oral Daily    diltiaZEM  60 mg Per NG tube Q8H    fluticasone-vilanterol  1 puff Inhalation Daily    heparin (porcine)  5,000 Units Subcutaneous Q8H    hydrocortisone sodium succinate  100 mg Intravenous Q8H    indomethacin  75 mg Oral BID    lactulose  20 g Per G Tube BID    miconazole nitrate 2%   Topical (Top) BID    pantoprozole (PROTONIX) IV  40 mg Intravenous Daily    piperacillin-tazobactam (ZOSYN) IVPB  4.5 g Intravenous Q8H    senna-docusate 8.6-50 mg  1 tablet Per G Tube BID    sodium chloride 0.9%  10 mL Intravenous Q6H    vancomycin (VANCOCIN) IVPB  15 mg/kg Intravenous Q12H     PRN Meds:calcium gluconate IVPB, calcium gluconate IVPB, calcium gluconate IVPB, dextrose 50%, diclofenac sodium, diphenhydrAMINE, glucagon (human recombinant), hydrALAZINE, insulin aspart U-100, magnesium sulfate IVPB, magnesium sulfate IVPB, omnipaque, omnipaque, ondansetron, potassium chloride 10%, potassium chloride 10%, potassium chloride 10%, potassium, sodium phosphates, potassium, sodium phosphates, potassium, sodium phosphates, Flushing PICC Protocol **AND** sodium chloride 0.9% **AND** sodium chloride 0.9%, sodium chloride 0.9%     Review of patient's allergies indicates:   Allergen Reactions    Ace inhibitors Swelling    Carvedilol      Other reaction(s): Other (See Comments)  blister    Hydralazine analogues     Metformin Nausea And Vomiting    Tetracycline Itching    Tetracyclines Swelling    Travatan (with benzalkonium) [travoprost (benzalkonium)]     Travoprost Itching     Other reaction(s): Other (See Comments)  Blurry vision     Objective:     Vital Signs (Most Recent):  Temp: 98.3 °F (36.8 °C) (02/20/19 1100)  Pulse: 82 (02/20/19 1300)  Resp: 19 (02/20/19 1300)  BP: (!) 155/71 (02/20/19 1300)  SpO2: 96 % (02/20/19 1300) Vital Signs (24h  Range):  Temp:  [98.2 °F (36.8 °C)-99.1 °F (37.3 °C)] 98.3 °F (36.8 °C)  Pulse:  [] 82  Resp:  [12-44] 19  SpO2:  [79 %-100 %] 96 %  BP: ()/(51-96) 155/71     Weight: 77.1 kg (170 lb)  Body mass index is 32.12 kg/m².    Intake/Output - Last 3 Shifts       02/18 0700 - 02/19 0659 02/19 0700 - 02/20 0659 02/20 0700 - 02/21 0659    P.O. 0      I.V. (mL/kg) 300 (4) 697 (9)     NG/ 200 120    IV Piggyback 500 2100 1100    Total Intake(mL/kg) 1440 (19.1) 2997 (38.9) 1220 (15.8)    Urine (mL/kg/hr) 975 (0.5) 417 (0.2) 120 (0.2)    Emesis/NG output 0      Drains  30     Stool 0 0 0    Total Output 975 447 120    Net +465 +2550 +1100           Stool Occurrence 3 x 3 x 1 x    Emesis Occurrence 0 x            Physical Exam   Constitutional: She appears well-developed and well-nourished. She appears distressed.   HENT:   Head: Normocephalic and atraumatic.   Cardiovascular: Normal rate and regular rhythm.   Pulmonary/Chest:   On bipap   Abdominal: Soft.   Soft, diffusely tender, moaning to palpation, midline firmness  Multiple areas of excoriation and skin breakdown noted to anterior abdomen   Musculoskeletal:   Difficult to examine extremities - R hand and arm swollen and moans in pain when trying to examine any part of her body   Skin: Skin is warm and dry.   Excoriations on abdomen and buttocks   Psychiatric:   Opens eyes and moans to stimulation   Vitals reviewed.    Significant Labs:  CBC:   Recent Labs   Lab 02/20/19  0300   WBC 17.59*   RBC 2.90*   HGB 8.1*   HCT 27.5*      MCV 95   MCH 27.9   MCHC 29.5*     BMP:   Recent Labs   Lab 02/20/19  0300   *   *   K 4.0      CO2 35*   BUN 8   CREATININE 0.8   CALCIUM 10.4   MG 1.7     CMP:   Recent Labs   Lab 02/20/19  0300   *   CALCIUM 10.4   ALBUMIN 1.4*   PROT 5.6*   *   K 4.0   CO2 35*      BUN 8   CREATININE 0.8   ALKPHOS 1,747*   ALT 23   *   BILITOT 4.8*     LFTs:   Recent Labs   Lab 02/20/19  0300    ALT 23   *   ALKPHOS 1,747*   BILITOT 4.8*   PROT 5.6*   ALBUMIN 1.4*     Coagulation:   Recent Labs   Lab 02/18/19  1208 02/19/19  1441   LABPROT 11.8 11.6   INR 1.2 1.1   APTT 32.3*  --      ABGs:   Recent Labs   Lab 02/20/19  0453   PH 7.424   PCO2 56.5*   PO2 92   HCO3 37.0*   POCSATURATED 97   BE 13     Assessment/Plan:     Acute cystitis    UA showing many bacteria and 2+ leukocytes - nieves in place, not speciated yet     Neurological finding    - appreciate vascular neurology assistance  - likely acute encephalopathy related to infectious process   -started Vanc/zosyn on 2/19     Urinary incontinence without sensory awareness    Nieves in place     Elevated liver enzymes    -LFTs, Tbili and Alk phos all increased  -CT showed biliary sludge  -consult hepatology  - obtain RUQ US - may consider HIDA in the future but currently not stable enough from a respiratory standpoint to go down for it     Acute blood loss anemia    -continue to monitor  -Last transfused 2/11/19     Dysarthria    -PEG placed 2/11/19     Multiple skin tears    -Wound care following     Depression    -Continue Cymbalta     Hypophosphatemia    -Replace as needed     Debility    -PT/OT; history of fall with non displaced oblique right tibia fracture at metaphysis into diaphysis. Was wearing brace.  -Severely debilited; needs placement. SW consulted and following       Generalized abdominal pain    64 yo female presenting with abdominal pain, nausea, elevated lactate s/p open drainage pelvic abscess 1/25     -NPO  -Tube feeds  -PO pain/nausea meds  -WOCN following, appreciate help  -DVT/GI prophylaxis  -PT/OT - SNF     Severe malnutrition    -Continue tube feeds if pt able to tolerate otherwise will need to consider an alternate source of nutrition although may want to avoid TPN with current hepatic state     Gastroesophageal reflux disease without esophagitis    -Continue BID protonix     (HFpEF) heart failure with preserved ejection  fraction    Last echo 8/2018 with no WMAs, grade 1DD, EF 60%  -Strict I/O, daily weights  -holding diuresis today     Moderate asthma without complication    -Continue with scheduled duonebs  -on BiPAP this AM - O2 and CO2 improved     Elevated troponin    -Cardiology consulted. On ASA/plavix at home     CAD (coronary artery disease)    -ASA, plavix     Type 2 diabetes mellitus    -SSI, appreciate endocrine assistance          Cassie Choudhary MD  General Surgery  [facility]

## 2019-02-20 NOTE — SUBJECTIVE & OBJECTIVE
Interval History/Significant Events: Patient remains somnolent overnight and on BiPAP. Lactic acid peaked at 3.0, remains on broad spectrum Abx, UA dirty, Urine Cx sent, Blood Cxs pending.    Follow-up For: Procedure(s) (LRB):  LAPAROTOMY, EXPLORATORY (N/A)  INCISION AND DRAINAGE, ABSCESS-  drainage of pelvic abscess (N/A)  EXCISION, ABSCESS- drainage abdominal wall abscess (N/A)  APPLICATION, WOUND VAC (N/A)    Post-Operative Day: 26 Days Post-Op    Continuous Infusions:    dextrose 5 % and 0.45 % NaCl with KCl 20 mEq 75 mL/hr at 02/20/19 0700       Scheduled Meds:    albuterol-ipratropium  3 mL Nebulization Q4H WAKE    aspirin  81 mg Oral Daily    clopidogrel  75 mg Oral Daily    diltiaZEM  180 mg Oral Daily    DULoxetine  60 mg Oral Daily    fluticasone-vilanterol  1 puff Inhalation Daily    heparin (porcine)  5,000 Units Subcutaneous Q8H    indomethacin  75 mg Oral BID    lactated ringers  1,000 mL Intravenous Once    lactulose  20 g Per G Tube BID    miconazole nitrate 2%   Topical (Top) BID    pantoprozole (PROTONIX) IV  40 mg Intravenous Daily    piperacillin-tazobactam (ZOSYN) IVPB  4.5 g Intravenous Q8H    predniSONE 5 mg/5 ml  10 mg Per G Tube Daily    senna-docusate 8.6-50 mg  1 tablet Per G Tube BID    sodium chloride 0.9%  10 mL Intravenous Q6H    vancomycin (VANCOCIN) IVPB  15 mg/kg Intravenous Q12H       PRN Meds: calcium gluconate IVPB, calcium gluconate IVPB, calcium gluconate IVPB, dextrose 50%, diclofenac sodium, diphenhydrAMINE, glucagon (human recombinant), hydrALAZINE, insulin aspart U-100, magnesium sulfate IVPB, magnesium sulfate IVPB, omnipaque, omnipaque, ondansetron, potassium chloride 10%, potassium chloride 10%, potassium chloride 10%, potassium, sodium phosphates, potassium, sodium phosphates, potassium, sodium phosphates, Flushing PICC Protocol **AND** sodium chloride 0.9% **AND** sodium chloride 0.9%, sodium chloride 0.9%      Objective:     Vital Signs (Most  Recent):  Temp: 99 °F (37.2 °C) (02/20/19 0700)  Pulse: 76 (02/20/19 0700)  Resp: 16 (02/20/19 0700)  BP: (!) 121/58 (02/20/19 0700)  SpO2: (!) 94 % (02/20/19 0700) Vital Signs (24h Range):  Temp:  [98.2 °F (36.8 °C)-99.6 °F (37.6 °C)] 99 °F (37.2 °C)  Pulse:  [] 76  Resp:  [16-44] 16  SpO2:  [79 %-100 %] 94 %  BP: ()/(51-96) 121/58     Weight: 77.1 kg (170 lb)  Body mass index is 32.12 kg/m².      Intake/Output Summary (Last 24 hours) at 2/20/2019 0734  Last data filed at 2/20/2019 0700  Gross per 24 hour   Intake 2997 ml   Output 482 ml   Net 2515 ml       Physical Exam   Nursing note and vitals reviewed.  General: well developed, in no acute distress, on BiPAP  HEENT: NC/AT, eyes normal, ears normal, nares midline no deviation, trachea midline  Lungs: clear to auscultation bilaterally, no additional sounds  Chest: RRR, S1/S2 normal, no rubs, murmurs, gallops  Abdomen: soft, not distended, not tender, no masses, no hernias, no striae  MSK: normal ROM in all extremities   Skin: no rashes, burns, bruises, or petechiae  Neuro: CNs grossly intact, no obvious sensory or motor deficits  Psych: minimally responsive, alert to person, somnolent    Vents: BiPAP 15/5 cm H20 @ 40% FiO2    Lines/Drains/Airways     Central Venous Catheter Line                 Port A Cath Single Lumen 02/17/19 0930 right subclavian 2 days          Drain                 Gastrostomy/Enterostomy 02/11/19 LUQ 9 days         Urethral Catheter 02/17/19 1507 Straight-tip 16 Fr. 2 days                Significant Labs:    CBC/Anemia Profile:  Recent Labs   Lab 02/19/19  0402 02/19/19  1441 02/20/19  0300   WBC 11.24 14.06* 17.59*   HGB 8.2* 8.1* 8.1*   HCT 27.0* 27.7* 27.5*   * 146* 152   MCV 93 96 95   RDW 17.4* 17.4* 17.9*        Chemistries:  Recent Labs   Lab 02/19/19  0402 02/19/19  1159 02/19/19  1441 02/20/19  0300     --  146* 146*   K 3.5 4.1 4.0 4.0     --  105 104   CO2 39*  --  36* 35*   BUN 9  --  8 8    CREATININE 0.8  --  0.8 0.8   CALCIUM 10.1  --  10.0 10.4   ALBUMIN 1.4*  --  1.4* 1.4*   PROT 5.6*  --  5.5* 5.6*   BILITOT 4.0*  --  4.1* 4.8*   ALKPHOS 1,553*  --  1,648* 1,747*   ALT 24  --  23 23   AST 95*  --  93* 103*   MG 1.8  --  2.0 1.7   PHOS 2.8  --  2.3* 2.4*       ABGs:   Recent Labs   Lab 02/20/19  0453   PH 7.424   PCO2 56.5*   HCO3 37.0*   POCSATURATED 97   BE 13     Blood Culture:   Recent Labs   Lab 02/19/19  1531   LABBLOO No Growth to date  No Growth to date     Coagulation:   Recent Labs   Lab 02/18/19  1208 02/19/19  1441   INR 1.2 1.1   APTT 32.3*  --      Lactic Acid:   Recent Labs   Lab 02/19/19  1930 02/19/19  2300 02/20/19  0300   LACTATE 2.4* 3.0* 2.5*     POCT Glucose:   Recent Labs   Lab 02/19/19  1931 02/19/19  2302 02/20/19  0259   POCTGLUCOSE 220* 185* 130*     Urine Culture: No results for input(s): LABURIN in the last 48 hours.    Significant Imaging:  I have reviewed all pertinent imaging results/findings within the past 24 hours.

## 2019-02-20 NOTE — SUBJECTIVE & OBJECTIVE
Interval History:   Transferred to SICU yesterday after rapid response called for decreased responsiveness. Ammonia down to 58 from 90s, leukocytosis this morning and Alk phos/LFTs continue to be increased with Alk phos at 1700 today. CO2 still elevated on gas but improved (57).    Medications:  Continuous Infusions:   dextrose 5 % and 0.45 % NaCl with KCl 20 mEq 75 mL/hr at 02/20/19 1300     Scheduled Meds:   albuterol-ipratropium  3 mL Nebulization Q4H WAKE    aspirin  81 mg Oral Daily    clopidogrel  75 mg Oral Daily    diltiaZEM  60 mg Per NG tube Q8H    fluticasone-vilanterol  1 puff Inhalation Daily    heparin (porcine)  5,000 Units Subcutaneous Q8H    hydrocortisone sodium succinate  100 mg Intravenous Q8H    indomethacin  75 mg Oral BID    lactulose  20 g Per G Tube BID    miconazole nitrate 2%   Topical (Top) BID    pantoprozole (PROTONIX) IV  40 mg Intravenous Daily    piperacillin-tazobactam (ZOSYN) IVPB  4.5 g Intravenous Q8H    senna-docusate 8.6-50 mg  1 tablet Per G Tube BID    sodium chloride 0.9%  10 mL Intravenous Q6H    vancomycin (VANCOCIN) IVPB  15 mg/kg Intravenous Q12H     PRN Meds:calcium gluconate IVPB, calcium gluconate IVPB, calcium gluconate IVPB, dextrose 50%, diclofenac sodium, diphenhydrAMINE, glucagon (human recombinant), hydrALAZINE, insulin aspart U-100, magnesium sulfate IVPB, magnesium sulfate IVPB, omnipaque, omnipaque, ondansetron, potassium chloride 10%, potassium chloride 10%, potassium chloride 10%, potassium, sodium phosphates, potassium, sodium phosphates, potassium, sodium phosphates, Flushing PICC Protocol **AND** sodium chloride 0.9% **AND** sodium chloride 0.9%, sodium chloride 0.9%     Review of patient's allergies indicates:   Allergen Reactions    Ace inhibitors Swelling    Carvedilol      Other reaction(s): Other (See Comments)  blister    Hydralazine analogues     Metformin Nausea And Vomiting    Tetracycline Itching    Tetracyclines Swelling     Travatan (with benzalkonium) [travoprost (benzalkonium)]     Travoprost Itching     Other reaction(s): Other (See Comments)  Blurry vision     Objective:     Vital Signs (Most Recent):  Temp: 98.3 °F (36.8 °C) (02/20/19 1100)  Pulse: 82 (02/20/19 1300)  Resp: 19 (02/20/19 1300)  BP: (!) 155/71 (02/20/19 1300)  SpO2: 96 % (02/20/19 1300) Vital Signs (24h Range):  Temp:  [98.2 °F (36.8 °C)-99.1 °F (37.3 °C)] 98.3 °F (36.8 °C)  Pulse:  [] 82  Resp:  [12-44] 19  SpO2:  [79 %-100 %] 96 %  BP: ()/(51-96) 155/71     Weight: 77.1 kg (170 lb)  Body mass index is 32.12 kg/m².    Intake/Output - Last 3 Shifts       02/18 0700 - 02/19 0659 02/19 0700 - 02/20 0659 02/20 0700 - 02/21 0659    P.O. 0      I.V. (mL/kg) 300 (4) 697 (9)     NG/ 200 120    IV Piggyback 500 2100 1100    Total Intake(mL/kg) 1440 (19.1) 2997 (38.9) 1220 (15.8)    Urine (mL/kg/hr) 975 (0.5) 417 (0.2) 120 (0.2)    Emesis/NG output 0      Drains  30     Stool 0 0 0    Total Output 975 447 120    Net +465 +2550 +1100           Stool Occurrence 3 x 3 x 1 x    Emesis Occurrence 0 x            Physical Exam   Constitutional: She appears well-developed and well-nourished. She appears distressed.   HENT:   Head: Normocephalic and atraumatic.   Cardiovascular: Normal rate and regular rhythm.   Pulmonary/Chest:   On bipap   Abdominal: Soft.   Soft, diffusely tender, moaning to palpation, midline firmness  Multiple areas of excoriation and skin breakdown noted to anterior abdomen   Musculoskeletal:   Difficult to examine extremities - R hand and arm swollen and moans in pain when trying to examine any part of her body   Skin: Skin is warm and dry.   Excoriations on abdomen and buttocks   Psychiatric:   Opens eyes and moans to stimulation   Vitals reviewed.    Significant Labs:  CBC:   Recent Labs   Lab 02/20/19  0300   WBC 17.59*   RBC 2.90*   HGB 8.1*   HCT 27.5*      MCV 95   MCH 27.9   MCHC 29.5*     BMP:   Recent Labs   Lab  02/20/19  0300   *   *   K 4.0      CO2 35*   BUN 8   CREATININE 0.8   CALCIUM 10.4   MG 1.7     CMP:   Recent Labs   Lab 02/20/19  0300   *   CALCIUM 10.4   ALBUMIN 1.4*   PROT 5.6*   *   K 4.0   CO2 35*      BUN 8   CREATININE 0.8   ALKPHOS 1,747*   ALT 23   *   BILITOT 4.8*     LFTs:   Recent Labs   Lab 02/20/19  0300   ALT 23   *   ALKPHOS 1,747*   BILITOT 4.8*   PROT 5.6*   ALBUMIN 1.4*     Coagulation:   Recent Labs   Lab 02/18/19  1208 02/19/19  1441   LABPROT 11.8 11.6   INR 1.2 1.1   APTT 32.3*  --      ABGs:   Recent Labs   Lab 02/20/19  0453   PH 7.424   PCO2 56.5*   PO2 92   HCO3 37.0*   POCSATURATED 97   BE 13      Abdomen soft, non-tender, no guarding.

## 2019-02-20 NOTE — ASSESSMENT & PLAN NOTE
Patient is a 64 y/o with a complicated hospital stay with infection/sepsis, hypercapnia, HF with volume overload, with worsening elevation of alkaline phosphtase to 1700, with elevated TBili 4.5. Her U/S did not show signs of obstruction earlier this admission, and extrahepatic obstruction seems unlikely. No imaging findings to suggest underlying liver disease or portal HTN. She has a normal synthetic function with normal INR. Hyperammonemia can be caused by multiple etiologies, but in the absence of evidence of liver disease, it is very unlikely to be contributing to the patient's declining mental status. The patient can be continued on lactulose, but it is unlikely to be beneficial for treatment of patient's encephalopathy.   Pattern of abnormal liver enzymes is suggestive of cholestatic injury which is likely drug induced or due to sepsis. Antibiotics can be an etiology, but would not recommend discontinuation of antibiotics based on abnormal liver tests. It is reasonable to obtain basic workup with acute viral panel and basic auto-immune workup  (ordered; although these are very unlikely to be the cause of abnormal liver enzymes). If the patient fully recovers from this acute infection with persistent elevation of liver enzymes, we can consider further evaluation possibly with liver biopsy at that time.    We will sign off at this time

## 2019-02-20 NOTE — HOSPITAL COURSE
02/19/19: Arrived to the SICU on BiPAP after rapid response was called to patient's room (1007). At that time patient was minimally interactive.  02/20/19: Patient remains somnolent overnight and on BiPAP. Lactic acid peaked at 3.0, remains on broad spectrum Abx, UA dirty, Urine Cx sent, Blood Cxs pending.  02/24/19: On BiPAP QHS. Alert and oriented to person and place this AM. Stable for stepdown

## 2019-02-20 NOTE — PROGRESS NOTES
Physical Therapy   Transfer to ICU    Sheryl Martines   MRN: [id number]   Pt transferred to ICU, PT orders D/Hai. New orders present and PT will continue. Clarissa Salvador PT 2/20/19

## 2019-02-20 NOTE — ASSESSMENT & PLAN NOTE
-Continue tube feeds if pt able to tolerate otherwise will need to consider an alternate source of nutrition although may want to avoid TPN with current hepatic state

## 2019-02-20 NOTE — CONSULTS
[facility]  Hepatology  Consult Note    Patient Name: Sheryl Martines  MRN: [id number]  Admission Date: 1/23/2019  Hospital Length of Stay: 27 days  Attending Provider: Monster Laurent MD   Primary Care Physician: Primary Doctor No  Principal Problem:Somnolence    Inpatient consult to Hepatology  Consult performed by: Ethan Santillan MD  Consult ordered by: Sudhakar López MD        Subjective:     Transplant status: No    HPI:  Sheryl Martines is a 64 y/o female with past medical history of COPD, CAD, HFpEF, Asthma, HTN, DM, recent complicated appendectomy in Aug 2018, complicated by C.diff infection, history of CVA with chronic debility, presented in January/23 2019 with intractable nausea and vomiting. She was found to have a pelvic abscess that was drained. She had a complicated postoperative course requiring intubation, CRRT. She stabilized initially, but was noted to have waxing and waning mental status which was thought to be due to hypercapnia, and was placed on BiPAP intermittently.   The patient was noted to have elevated ALKP/TB/and GGT since December 2018 when she started to have worsening of her clinical status. An U/S was obtained initially on 12/12/2018 with hepatomegaly and steatosis; which was similar to evaluation done in 2/2019. Since admission she had gradual worsening of ALKP/GGT/TB, with now ALKP 1700, TB 4.8, GGT 1700, AST//23. Her INR is 1.1. She has no known liver disease on prior chart review.  Overnight the patient was found to be somnolent, and a rapid response was called. Ammonia was noted to be elevated at 80. Hepatology is consulted to evaluate for possible liver cause for altered mental status      Review of Systems   Unable to perform ROS: Mental status change       Past Medical History:   Diagnosis Date    Abnormal LFTs 12/14/2018    Asthma     Closed compression fracture of fourth lumbar vertebra     COPD (chronic obstructive pulmonary disease)      Coronary artery disease     Diabetes mellitus     Fall 10/4/2018    Fracture     right tib & fib    Glaucoma     High cholesterol     Hypertension     Infected incision 9/20/2018    Intractable nausea and vomiting 1/23/2019    Iritis     Pulmonary embolus     S/P appendectomy 9/11/2018    Stroke     rt sided weakness.       Past Surgical History:   Procedure Laterality Date    ABDOMINAL SURGERY      APPENDECTOMY N/A 8/26/2018    Performed by Bernadine Melendrez MD at [facility] OR    APPENDECTOMY, LAPAROSCOPIC---CONVERTED TO OPEN APPENDECTOMY @0950 N/A 8/26/2018    Performed by [facility]ine [facility]rez [facility] at [facility] OR    APPLICATION, WOUND VAC N/A 1/25/2019    Performed by Monster Laurent [facility] at [facility] OR [address] [place] [address]    BACK SURGERY      stimulator    CATARACT EXTRACTION      EXCISION, ABSCESS- drainage abdominal wall abscess N/A 1/25/2019    Performed by Monster Laurent MD at [facility] OR [address] [place] [address]    HYSTERECTOMY      INCISION AND DRAINAGE, ABSCESS-  drainage of pelvic abscess N/A 1/25/2019    Performed by Monster Laurent MD at [facility] OR [address] [place] [address]    LAPAROTOMY, EXPLORATORY N/A 1/25/2019    Performed by Monster Laurent MD at [facility] OR [address] [place] [address]    TOTAL HIP ARTHROPLASTY Left     2008         Review of patient's allergies indicates:   Allergen Reactions    Ace inhibitors Swelling    Carvedilol      Other reaction(s): Other (See Comments)  blister    Hydralazine analogues     Metformin Nausea And Vomiting    Tetracycline Itching    Tetracyclines Swelling    Travatan (with benzalkonium) [travoprost (benzalkonium)]     Travoprost Itching     Other reaction(s): Other (See Comments)  Blurry vision       Tobacco Use    Smoking status: Never Smoker    Smokeless tobacco: Never Used   Substance and Sexual Activity    Alcohol use: No     Frequency: Never    Drug use: No    Sexual activity: No     Partners: Male       Medications Prior to Admission   Medication Sig Dispense Refill Last Dose     acetaminophen (TYLENOL) 500 MG tablet Take 1,000 mg by mouth 2 (two) times daily as needed for Pain.   1/22/2019    albuterol (PROVENTIL/VENTOLIN HFA) 90 mcg/actuation inhaler Inhale 2 puffs into the lungs every 6 (six) hours as needed for Wheezing or Shortness of Breath. Rescue   1/22/2019    albuterol-ipratropium (DUO-NEB) 2.5 mg-0.5 mg/3 mL nebulizer solution Take 3 mLs by nebulization every 4 (four) hours as needed for Wheezing or Shortness of Breath. Rescue    1/22/2019    aspirin (ECOTRIN) 81 MG EC tablet Take 1 tablet (81 mg total) by mouth once daily. 30 tablet 11 1/22/2019    baclofen (LIORESAL) 10 MG tablet Take 10 mg by mouth 2 (two) times daily as needed (MUSCLE SPASMS).   1/22/2019    cefdinir (OMNICEF) 300 MG capsule TK ONE C PO  BID FOR 7 DAYS  0 1/22/2019    cephALEXin (KEFLEX) 750 MG capsule TK ONE C PO TID FOR 5 DAYS  0 1/22/2019    ciprofloxacin HCl (CIPRO) 250 MG tablet Take 1 tablet (250 mg total) by mouth every 12 (twelve) hours. Starting 12/15 evening 5 tablet 0 1/22/2019    clopidogrel (PLAVIX) 75 mg tablet Take 75 mg by mouth once daily.   1/22/2019    diclofenac sodium (VOLTAREN) 1 % Gel Apply 2 g topically daily as needed (PAIN).   1/22/2019    diltiaZEM (CARDIZEM CD) 180 MG 24 hr capsule Take 180 mg by mouth once daily.   1/22/2019    DULoxetine (CYMBALTA) 60 MG capsule Take 60 mg by mouth once daily.   1/22/2019    fluticasone-vilanterol (BREO ELLIPTA) 100-25 mcg/dose diskus inhaler Inhale 1 puff into the lungs once daily. Controller   1/22/2019    gabapentin (NEURONTIN) 300 MG capsule Take 300 mg by mouth once daily.   1/22/2019    Lactobacillus rhamnosus-fiber 2.5 billion cell-3.5 gram PwPk Take 1 capsule by mouth.   1/22/2019    lansoprazole (PREVACID) 30 MG capsule Take 30 mg by mouth once daily.   1/22/2019    losartan (COZAAR) 100 MG tablet Take 100 mg by mouth once daily.    1/22/2019    ondansetron (ZOFRAN) 4 MG tablet Take 1 tablet (4 mg total) by mouth every 6  (six) hours as needed. 30 tablet 1 1/22/2019    ondansetron (ZOFRAN) 4 MG tablet Take 4-8 mg by mouth.   1/22/2019    potassium chloride SA (K-DUR,KLOR-CON) 10 MEQ tablet Take 10 mEq by mouth.   1/22/2019    predniSONE (DELTASONE) 10 MG tablet Take 4 tablets (40 mg) on 12/16, then take 1 tablet (10 mg) daily 30 tablet 0 1/22/2019    pyridoxine, vitamin B6, (VITAMIN B-6) 50 MG Tab Take 1 tablet (50 mg total) by mouth once daily.  0 1/22/2019    simvastatin (ZOCOR) 40 MG tablet Take 40 mg by mouth.   1/22/2019    theophylline (THEODUR) 300 mg 24 hr capsule Take 300 mg by mouth once daily.   1/22/2019    traMADol (ULTRAM) 50 mg tablet TK 1 T PO BID PRN  0 1/22/2019    [DISCONTINUED] baclofen (LIORESAL) 10 MG tablet Take 10 mg by mouth.   1/22/2019    [DISCONTINUED] furosemide (LASIX) 40 MG tablet Take 40 mg by mouth once daily.    1/22/2019    [DISCONTINUED] HYDROcodone-acetaminophen (NORCO) 5-325 mg per tablet Take 1 tablet by mouth every 4 to 6 hours as needed. 42 tablet 0 1/22/2019    [DISCONTINUED] prazosin (MINIPRESS) 5 MG capsule TK 1 C PO BID  13 1/22/2019       Objective:     Vital Signs (Most Recent):  Temp: 97.8 °F (36.6 °C) (02/20/19 1500)  Pulse: 82 (02/20/19 1500)  Resp: 17 (02/20/19 1500)  BP: 124/69 (02/20/19 1500)  SpO2: 96 % (02/20/19 1500) Vital Signs (24h Range):  Temp:  [97.8 °F (36.6 °C)-99.1 °F (37.3 °C)] 97.8 °F (36.6 °C)  Pulse:  [] 82  Resp:  [12-44] 17  SpO2:  [79 %-100 %] 96 %  BP: ()/(51-96) 124/69     Weight: 77.1 kg (170 lb) (02/20/19 0500)  Body mass index is 32.12 kg/m².    Physical Exam   Constitutional: She appears lethargic. She is sleeping. She has a sickly appearance. She appears distressed.   BiPAP on place   HENT:   Head: Normocephalic.   Eyes: Conjunctivae are normal. Scleral icterus is present.   Neck: Normal range of motion. Neck supple.   Cardiovascular: Regular rhythm.   Pulmonary/Chest: She has rales.   Abdominal: Soft. Bowel sounds are normal. She  exhibits distension. She exhibits no mass. There is tenderness. There is no rebound and no guarding.   PEG tube in place   Musculoskeletal: Normal range of motion.   Neurological: She appears lethargic.   Skin: Skin is warm and dry.       MELD-Na score: 13 at 2/20/2019  3:00 AM  MELD score: 13 at 2/20/2019  3:00 AM  Calculated from:  Serum Creatinine: 0.8 mg/dL (Rounded to 1 mg/dL) at 2/20/2019  3:00 AM  Serum Sodium: 146 mmol/L (Rounded to 137 mmol/L) at 2/20/2019  3:00 AM  Total Bilirubin: 4.8 mg/dL at 2/20/2019  3:00 AM  INR(ratio): 1.1 at 2/19/2019  2:41 PM  Age: 63 years    Significant Labs:  CBC:   Recent Labs   Lab 02/20/19  0300   WBC 17.59*   RBC 2.90*   HGB 8.1*   HCT 27.5*        BMP:   Recent Labs   Lab 02/20/19  0300   *   *   K 4.0      CO2 35*   BUN 8   CREATININE 0.8   CALCIUM 10.4     CMP:   Recent Labs   Lab 02/20/19  0300   *   CALCIUM 10.4   ALBUMIN 1.4*   PROT 5.6*   *   K 4.0   CO2 35*      BUN 8   CREATININE 0.8   ALKPHOS 1,747*   ALT 23   *   BILITOT 4.8*     Coagulation:   Recent Labs   Lab 02/18/19  1208 02/19/19  1441   INR 1.2 1.1   APTT 32.3*  --        Significant Imaging:  Labs: Reviewed  US: Reviewed  CT: Reviewed    Assessment/Plan:     Elevated liver enzymes    Patient is a 62 y/o with a complicated hospital stay with infection/sepsis, hypercapnia, HF with volume overload, with worsening elevation of alkaline phosphtase to 1700, with elevated TBili 4.5. Her U/S did not show signs of obstruction earlier this admission, and extrahepatic obstruction seems unlikely. No imaging findings to suggest underlying liver disease or portal HTN. She has a normal synthetic function with normal INR. Hyperammonemia can be caused by multiple etiologies, but in the absence of evidence of liver disease, it is very unlikely to be contributing to the patient's declining mental status. The patient can be continued on lactulose, but it is unlikely to be  beneficial for treatment of patient's encephalopathy.   Pattern of abnormal liver enzymes is suggestive of cholestatic injury which is likely drug induced or due to sepsis. Antibiotics can be an etiology, but would not recommend discontinuation of antibiotics based on abnormal liver tests. It is reasonable to obtain basic workup with acute viral panel and basic auto-immune workup  (ordered; although these are very unlikely to be the cause of abnormal liver enzymes). If the patient fully recovers from this acute infection with persistent elevation of liver enzymes, we can consider further evaluation possibly with liver biopsy at that time.    We will sign off at this time     Please contact us with further questions or concerns.    Thank you for your consult. I will sign off. Please contact us if you have any additional questions.    Candace Santillan MD  Hepatology  [facility]

## 2019-02-20 NOTE — HPI
Sheryl Martines is a 64 y/o female with past medical history of COPD, CAD, HFpEF, Asthma, HTN, DM, recent complicated appendectomy in Aug 2018, complicated by C.diff infection, history of CVA with chronic debility, presented in January/23 2019 with intractable nausea and vomiting. She was found to have a pelvic abscess that was drained. She had a complicated postoperative course requiring intubation, CRRT. She stabilized initially, but was noted to have waxing and waning mental status which was thought to be due to hypercapnia, and was placed on BiPAP intermittently.   The patient was noted to have elevated ALKP/TB/and GGT since December 2018 when she started to have worsening of her clinical status. An U/S was obtained initially on 12/12/2018 with hepatomegaly and steatosis; which was similar to evaluation done in 2/2019. Since admission she had gradual worsening of ALKP/GGT/TB, with now ALKP 1700, TB 4.8, GGT 1700, AST//23. Her INR is 1.1. She has no known liver disease on prior chart review.  Overnight the patient was found to be somnolent, and a rapid response was called. Ammonia was noted to be elevated at 80. Hepatology is consulted to evaluate for possible liver cause for altered mental status

## 2019-02-20 NOTE — CONSULTS
Patient seen for wound care consult and follow up. Patient has been being followed by wound care. Skin tears to the abdomen have all skinned over at this point but are still pink and lacking in melanin. RLQ wound remains with partial slough but mostly red granular tissue. Recommend to continue with triad. Labia, perineal and buttocks remain denuded and yeast clearing. Recommend to continue with critic aid antifungal to these areas. She remains on a immerse surface. Heels remain clear. Discussed above with nurse. MD gave nursing order for BMS to collect liquid stool so it was placed per nursing. Will continue to follow as needed. Nursing to continue care.   Gwen Castillo BS, BSN, RN, COCN, [facility]  [id number]

## 2019-02-20 NOTE — ASSESSMENT & PLAN NOTE
TSH 4.718; 5 months ago was 1.563  Free T4 minimally low at .7 (nl range .71-1.51)  Likely 2/2 non-thyroidal illness (in the setting of severe illness) & not the cause of patient's current illness  Would not assess thyroid function in severe illness  Would repeat TFTs in 1-2 weeks once patient clinically improves

## 2019-02-20 NOTE — PROGRESS NOTES
MD Mak notified of pt's 3am lactic, wbc, and mild fever. Also notified of increased firmness in pt's belly. 250ml albumin ordered. MD will round later to assess patient. VSS. Will continue to monitor closely.

## 2019-02-20 NOTE — PROGRESS NOTES
Last time patient ate was this morning     Cornell Todd RN  08/21/18 [address] [facility]  Critical Care - Surgery  Progress Note    Patient Name: Sheryl Martines  MRN: [id number]  Admission Date: 1/23/2019  Hospital Length of Stay: 27 days  Code Status: Full Code  Attending Provider: Monster Laurent MD  Primary Care Provider: Primary Doctor No   Principal Problem: Somnolence    Subjective:     Hospital/ICU Course:  02/19/19: Arrived to the SICU on BiPAP after rapid response was called to patient's room (1007). At that time patient was minimally interactive.  02/20/19: Patient remains somnolent overnight and on BiPAP. Lactic acid peaked at 3.0, remains on broad spectrum Abx, UA dirty, Urine Cx sent, Blood Cxs pending.    Interval History/Significant Events: Patient remains somnolent overnight and on BiPAP. Lactic acid peaked at 3.0, remains on broad spectrum Abx, UA dirty, Urine Cx sent, Blood Cxs pending.    Follow-up For: Procedure(s) (LRB):  LAPAROTOMY, EXPLORATORY (N/A)  INCISION AND DRAINAGE, ABSCESS-  drainage of pelvic abscess (N/A)  EXCISION, ABSCESS- drainage abdominal wall abscess (N/A)  APPLICATION, WOUND VAC (N/A)    Post-Operative Day: 26 Days Post-Op    Continuous Infusions:    dextrose 5 % and 0.45 % NaCl with KCl 20 mEq 75 mL/hr at 02/20/19 0700       Scheduled Meds:    albuterol-ipratropium  3 mL Nebulization Q4H WAKE    aspirin  81 mg Oral Daily    clopidogrel  75 mg Oral Daily    diltiaZEM  180 mg Oral Daily    DULoxetine  60 mg Oral Daily    fluticasone-vilanterol  1 puff Inhalation Daily    heparin (porcine)  5,000 Units Subcutaneous Q8H    indomethacin  75 mg Oral BID    lactated ringers  1,000 mL Intravenous Once    lactulose  20 g Per G Tube BID    miconazole nitrate 2%   Topical (Top) BID    pantoprozole (PROTONIX) IV  40 mg Intravenous Daily    piperacillin-tazobactam (ZOSYN) IVPB  4.5 g Intravenous Q8H    predniSONE 5 mg/5 ml  10 mg Per G Tube Daily    senna-docusate 8.6-50 mg  1 tablet Per G Tube BID    sodium chloride 0.9%   10 mL Intravenous Q6H    vancomycin (VANCOCIN) IVPB  15 mg/kg Intravenous Q12H       PRN Meds: calcium gluconate IVPB, calcium gluconate IVPB, calcium gluconate IVPB, dextrose 50%, diclofenac sodium, diphenhydrAMINE, glucagon (human recombinant), hydrALAZINE, insulin aspart U-100, magnesium sulfate IVPB, magnesium sulfate IVPB, omnipaque, omnipaque, ondansetron, potassium chloride 10%, potassium chloride 10%, potassium chloride 10%, potassium, sodium phosphates, potassium, sodium phosphates, potassium, sodium phosphates, Flushing PICC Protocol **AND** sodium chloride 0.9% **AND** sodium chloride 0.9%, sodium chloride 0.9%      Objective:     Vital Signs (Most Recent):  Temp: 99 °F (37.2 °C) (02/20/19 0700)  Pulse: 76 (02/20/19 0700)  Resp: 16 (02/20/19 0700)  BP: (!) 121/58 (02/20/19 0700)  SpO2: (!) 94 % (02/20/19 0700) Vital Signs (24h Range):  Temp:  [98.2 °F (36.8 °C)-99.6 °F (37.6 °C)] 99 °F (37.2 °C)  Pulse:  [] 76  Resp:  [16-44] 16  SpO2:  [79 %-100 %] 94 %  BP: ()/(51-96) 121/58     Weight: 77.1 kg (170 lb)  Body mass index is 32.12 kg/m².      Intake/Output Summary (Last 24 hours) at 2/20/2019 0734  Last data filed at 2/20/2019 0700  Gross per 24 hour   Intake 2997 ml   Output 482 ml   Net 2515 ml       Physical Exam   Nursing note and vitals reviewed.  General: well developed, in no acute distress, on BiPAP  HEENT: NC/AT, eyes normal, ears normal, nares midline no deviation, trachea midline  Lungs: clear to auscultation bilaterally, no additional sounds  Chest: RRR, S1/S2 normal, no rubs, murmurs, gallops  Abdomen: soft, not distended, not tender, no masses, no hernias, no striae  MSK: normal ROM in all extremities   Skin: no rashes, burns, bruises, or petechiae  Neuro: CNs grossly intact, no obvious sensory or motor deficits  Psych: minimally responsive, alert to person, somnolent    Vents: BiPAP 15/5 cm H20 @ 40% FiO2    Lines/Drains/Airways     Central Venous Catheter Line                  Port A Cath Single Lumen 02/17/19 0930 right subclavian 2 days          Drain                 Gastrostomy/Enterostomy 02/11/19 LUQ 9 days         Urethral Catheter 02/17/19 1507 Straight-tip 16 Fr. 2 days                Significant Labs:    CBC/Anemia Profile:  Recent Labs   Lab 02/19/19  0402 02/19/19  1441 02/20/19  0300   WBC 11.24 14.06* 17.59*   HGB 8.2* 8.1* 8.1*   HCT 27.0* 27.7* 27.5*   * 146* 152   MCV 93 96 95   RDW 17.4* 17.4* 17.9*        Chemistries:  Recent Labs   Lab 02/19/19  0402 02/19/19  1159 02/19/19  1441 02/20/19  0300     --  146* 146*   K 3.5 4.1 4.0 4.0     --  105 104   CO2 39*  --  36* 35*   BUN 9  --  8 8   CREATININE 0.8  --  0.8 0.8   CALCIUM 10.1  --  10.0 10.4   ALBUMIN 1.4*  --  1.4* 1.4*   PROT 5.6*  --  5.5* 5.6*   BILITOT 4.0*  --  4.1* 4.8*   ALKPHOS 1,553*  --  1,648* 1,747*   ALT 24  --  23 23   AST 95*  --  93* 103*   MG 1.8  --  2.0 1.7   PHOS 2.8  --  2.3* 2.4*       ABGs:   Recent Labs   Lab 02/20/19  0453   PH 7.424   PCO2 56.5*   HCO3 37.0*   POCSATURATED 97   BE 13     Blood Culture:   Recent Labs   Lab 02/19/19  1531   LABBLOO No Growth to date  No Growth to date     Coagulation:   Recent Labs   Lab 02/18/19  1208 02/19/19  1441   INR 1.2 1.1   APTT 32.3*  --      Lactic Acid:   Recent Labs   Lab 02/19/19 1930 02/19/19  2300 02/20/19  0300   LACTATE 2.4* 3.0* 2.5*     POCT Glucose:   Recent Labs   Lab 02/19/19 1931 02/19/19  2302 02/20/19  0259   POCTGLUCOSE 220* 185* 130*     Urine Culture: No results for input(s): LABURIN in the last 48 hours.    Significant Imaging:  I have reviewed all pertinent imaging results/findings within the past 24 hours.    Assessment/Plan:     * Somnolence    63 y.o. female w/ a significant PMHx of COPD, CAD, T2DM, CKD, HTN, CVA w/ residual right sided deficits, significant debility, and recent exploratory laparotomy for pelvis mass/abscess that required an ICU admission postoperatively. Patient stepped back up to the  SICU 02/19/19 s/p rapid response for somnolence and altered mental status.    Neuro:   - Patient minimally interactive  - Responds to repeated stimulation  - CT Head negative for any acute processes  - Not showing any focal neurological deficits  - Ammonia level of 84 umol/L at time of RR   - Trending down to 58 umol/L  - Lactulose 20g per gtube, titrating to 3-4 loose BMs/day    Pulmonary:   - Patient w/ a Hx of hypercapnic respiratory failure on this admission requiring intubation  - Hx of difficult intubation  - Currently on BiPAP 15/5 @ 40% FiO2  - Low threshold to intubate given Hx and mental status  - CXR daily  - ABGs PRN  - Started on empiric broad spectrum Abx  - Questionable RUL opacification concerning for developing PNA    Cardiac:   - Currently HDS, not requiring any pressors  - Hx of CAD and HTN  - Continuing home meds    Renal:    - Questionable Hx of CKD  - No acute issues on this admission  - Currently on Lasix 40 mg PO BID, will hold for now  - Continue nieves for strict I/Os  - Trend BUN/Cr  - UA dirty, Urine Cx pending     ID:   - Afebrile over last 24H  - Leukocytosis of 17.59  - Questionable new RUL opacification on CT chest  - Started on empiric broad spectrum Abx given clinical picture  - Lactic acidosis peaked at 3.0, trending Q4H  - Received 3L LR yesterday, 250 cc albumin  - Dirty UA, urine Cx sent  - Trend WBC w/ daily CBCs    Hem/Onc:   - H/H trending down over the last 24H  - Currently HDS w/ no signs of active bleeding  - CT abdomen/pelvis 02/18/19 w/ no acute processes  - Trend H/H w/ daily CBCs  - Transfuse for Hgb < 7.0 g/dL    Endocrine:    - Hx of T2DM  - Endocrine consulted, appreciate assistance  - Accuchecks Q6H as patient NPO  - Low dose SSI Q6H  - Elevated TSH w/ low free T4    Fluids/Electrolytes/Nutrition/GI:   - Replace electrolytes PRN  - Will hold TFs overnight  - Patient w/ significantly elevated Alk phos and GGT  - Previous RUQ U/S negative for any acute process  -  Ammonia elevated to 84 umol/L on admission to SICU   - Trending down to 54 umol/L  - Etiology of biliary disease remains unclear at this time    PPx:  - DVT: heparin 5000u subq Q8H  - Bowel: lactulose for 3-4 BMs/day  - PUP: Protonix 40 mg daily  - VAP: N/A    DISPO:  - Continue SICU care  - Somnolence w/ multifactorial etiology likely  - Lactulose for elevated ammonia, titrate to effect/BMs  - BiPAP for hypercapnia  - Trend lactic acid Q4H  - F/U blood cultures, respiratory cultures, urine cultures       Critical care was time spent personally by me on the following activities: development of treatment plan with patient or surrogate and bedside caregivers, discussions with consultants, evaluation of patient's response to treatment, examination of patient, ordering and performing treatments and interventions, ordering and review of laboratory studies, ordering and review of radiographic studies, pulse oximetry, re-evaluation of patient's condition.  This critical care time did not overlap with that of any other provider or involve time for any procedures.     Camron Mak MD  Critical Care - Surgery  [facility]

## 2019-02-20 NOTE — PROGRESS NOTES
[facility]  Endocrinology  Progress Note    Admit Date: 1/23/2019     Reason for Consult: Management of T2DM, Hyperglycemia     Surgical Procedure and Date:      LAPAROTOMY, EXPLORATORY (N/A)     INCISION AND DRAINAGE, ABSCESS-  drainage of pelvic abscess (N/A)     EXCISION, ABSCESS- drainage abdominal wall abscess (N/A)     APPLICATION, WOUND VAC (N/A)       Diabetes diagnosis year: 20 years ago    Home Diabetes Medications:  Reports being taken off of insulin prior to admission.      Levemir 20 units BID     Humalog 16 units TIDWM with correction     How often checking glucose at home? >4 x day   BG readings on regimen: 110's  Hypoglycemia on the regimen?  Yes - 40's  Missed doses on regimen?  No    Diabetes Complications include:     Hypoglycemia     Complicating diabetes co morbidities:   HLD, CVA, and CAD    Lab Results   Component Value Date    HGBA1C 4.7 01/24/2019       HPI:   Patient is a 63 y.o. female with a diagnosis of sepsis and lactic acidosis secondary to abdominal abscess. Also has diagnosis of COPD, asthma, CAD, CVA (2012), and DM2. Patient receives primary care for DM in [place]. Her primary care giver is her daughter. Patient's DM is well controlled by primary Endocrinologist in [place]. Patient has not been on insulin regimen for the past 3 weeks leading up to hospitalization. According to daughter, patient was not eating, and having low BG reading. Therefore, insulin regimen was stopped by patient's primary Endocrinologist. Endocrinology at [facility] consulted to manage DM/hyperglycemia.             Interval HPI:   Overnight events: Remains in ICU, still not responding to commands.  WBC 17.59 this am.  BG at or below goal overnight without any correction scale insulin needs.  Prednisone 10 mg daily initially started then switched to hydrocortisone 100mg IV q8.  On IV antibiotics.  Eating:   NPO  Nausea: No  Hypoglycemia and intervention: No  Fever: No  TPN and/or TF: No    BP (!)  "121/58 (BP Location: Right arm, Patient Position: Lying)   Pulse 76   Temp 99 °F (37.2 °C) (Axillary)   Resp 16   Ht 5' 1" (1.549 m)   Wt 77.1 kg (170 lb)   LMP  (LMP Unknown)   SpO2 (!) 94%   Breastfeeding? No   BMI 32.12 kg/m²      Labs Reviewed and Include    Recent Labs   Lab 02/20/19  0300   *   CALCIUM 10.4   ALBUMIN 1.4*   PROT 5.6*   *   K 4.0   CO2 35*      BUN 8   CREATININE 0.8   ALKPHOS 1,747*   ALT 23   *   BILITOT 4.8*     Lab Results   Component Value Date    WBC 17.59 (H) 02/20/2019    HGB 8.1 (L) 02/20/2019    HCT 27.5 (L) 02/20/2019    MCV 95 02/20/2019     02/20/2019     Recent Labs   Lab 02/19/19  1441   TSH 4.718*   FREET4 0.70*     Lab Results   Component Value Date    HGBA1C 4.7 01/24/2019       Nutritional status:   Body mass index is 32.12 kg/m².  Lab Results   Component Value Date    ALBUMIN 1.4 (L) 02/20/2019    ALBUMIN 1.4 (L) 02/19/2019    ALBUMIN 1.4 (L) 02/19/2019     Lab Results   Component Value Date    PREALBUMIN 4 (L) 02/18/2019    PREALBUMIN 5 (L) 02/14/2019    PREALBUMIN 6 (L) 02/07/2019       Estimated Creatinine Clearance: 67.6 mL/min (based on SCr of 0.8 mg/dL).    Accu-Checks  Recent Labs     02/18/19  1542 02/18/19  2205 02/19/19  0218 02/19/19  0635 02/19/19  1028 02/19/19  1426 02/19/19  1726 02/19/19  1931 02/19/19  2302 02/20/19  0259   POCTGLUCOSE 108 140* 137* 150* 182* 168* 190* 220* 185* 130*       Current Medications and/or Treatments Impacting Glycemic Control  Immunotherapy:    Immunosuppressants     None        Steroids:   Hormones (From admission, onward)    Start     Stop Route Frequency Ordered    02/20/19 0900  predniSONE 5 mg/5 ml solution 10 mg      -- PER G TUBE Daily 02/19/19 1608        Pressors:    Autonomic Drugs (From admission, onward)    None        Hyperglycemia/Diabetes Medications:   Antihyperglycemics (From admission, onward)    Start     Stop Route Frequency Ordered    02/17/19 0902  insulin aspart " U-100 pen 0-5 Units      -- SubQ Every 4 hours PRN 02/17/19 0803          ASSESSMENT and PLAN    * Somnolence    Cortisol 4.2, albumin 1.4 (low albumin underestimates cortisol level & therefore makes results inaccurate)  Adrenal insufficiency unlikely given patient's clinical signs/symptoms and clinical picture  Would stop steroids especially in the setting of infection     Abnormal thyroid function test    TSH 4.718; 5 months ago was 1.563  Free T4 minimally low at .7 (nl range .71-1.51)  Likely 2/2 non-thyroidal illness (in the setting of severe illness) & not the cause of patient's current illness  Would not assess thyroid function in severe illness  Would repeat TFTs in 1-2 weeks once patient clinically improves     On enteral nutrition    Tube feeds currently being held  Started on D5+ 1/2NS  May elevate BG  Monitor accuchecks       Alteration in skin integrity related to surgical incision    Optimize BG control for surgical wound healing       Overweight (BMI 25.0-29.9)    Body mass index is 32.12 kg/m².  may contribute to insulin resistance         Type 2 diabetes mellitus    BG goal 140 - 180   Currently NPO, on D5 +1/2 NS @75cc/hr  On steroids as well which may elevated BG  C/w low dose correction scale  BG monitoring q 4 hours         Anastacio Brunson MD  Endocrinology  [facility]

## 2019-02-21 PROBLEM — T38.0X5A ADVERSE EFFECT OF ADRENAL CORTICAL STEROIDS: Status: ACTIVE | Noted: 2019-01-28

## 2019-02-21 PROBLEM — J96.01 ACUTE RESPIRATORY FAILURE WITH HYPOXIA AND HYPERCARBIA: Status: ACTIVE | Noted: 2019-02-21

## 2019-02-21 PROBLEM — J96.02 ACUTE RESPIRATORY FAILURE WITH HYPOXIA AND HYPERCARBIA: Status: ACTIVE | Noted: 2019-02-21

## 2019-02-21 PROBLEM — R60.0 BILATERAL LOWER EXTREMITY EDEMA: Status: ACTIVE | Noted: 2019-02-21

## 2019-02-21 PROBLEM — E83.39 HYPOPHOSPHATEMIA: Status: RESOLVED | Noted: 2019-01-25 | Resolved: 2019-02-21

## 2019-02-21 LAB
ALBUMIN SERPL BCP-MCNC: 1.5 G/DL
ALBUMIN SERPL BCP-MCNC: 1.7 G/DL
ALLENS TEST: ABNORMAL
ALP SERPL-CCNC: 1247 U/L
ALP SERPL-CCNC: 1645 U/L
ALT SERPL W/O P-5'-P-CCNC: 17 U/L
ALT SERPL W/O P-5'-P-CCNC: 19 U/L
AMMONIA PLAS-SCNC: 50 UMOL/L
ANA SER QL IF: NORMAL
ANION GAP SERPL CALC-SCNC: 4 MMOL/L
ANION GAP SERPL CALC-SCNC: 4 MMOL/L
ANISOCYTOSIS BLD QL SMEAR: SLIGHT
AST SERPL-CCNC: 109 U/L
AST SERPL-CCNC: 93 U/L
BACTERIA UR CULT: NORMAL
BASOPHILS # BLD AUTO: 0.04 K/UL
BASOPHILS NFR BLD: 0.2 %
BILIRUB SERPL-MCNC: 3.4 MG/DL
BILIRUB SERPL-MCNC: 5.4 MG/DL
BUN SERPL-MCNC: 10 MG/DL
BUN SERPL-MCNC: 11 MG/DL
CA-I BLDV-SCNC: 1.26 MMOL/L
CALCIUM SERPL-MCNC: 7.1 MG/DL
CALCIUM SERPL-MCNC: 9.9 MG/DL
CHLORIDE SERPL-SCNC: 103 MMOL/L
CHLORIDE SERPL-SCNC: 117 MMOL/L
CO2 SERPL-SCNC: 23 MMOL/L
CO2 SERPL-SCNC: 34 MMOL/L
CREAT SERPL-MCNC: 0.7 MG/DL
CREAT SERPL-MCNC: 0.9 MG/DL
DELSYS: ABNORMAL
DIFFERENTIAL METHOD: ABNORMAL
EOSINOPHIL # BLD AUTO: 0 K/UL
EOSINOPHIL NFR BLD: 0 %
EP: 5
EP: 5
ERYTHROCYTE [DISTWIDTH] IN BLOOD BY AUTOMATED COUNT: 17.8 %
ERYTHROCYTE [SEDIMENTATION RATE] IN BLOOD BY WESTERGREN METHOD: 12 MM/H
ERYTHROCYTE [SEDIMENTATION RATE] IN BLOOD BY WESTERGREN METHOD: 20 MM/H
ERYTHROCYTE [SEDIMENTATION RATE] IN BLOOD BY WESTERGREN METHOD: 22 MM/H
EST. GFR  (AFRICAN AMERICAN): >60 ML/MIN/1.73 M^2
EST. GFR  (AFRICAN AMERICAN): >60 ML/MIN/1.73 M^2
EST. GFR  (NON AFRICAN AMERICAN): >60 ML/MIN/1.73 M^2
EST. GFR  (NON AFRICAN AMERICAN): >60 ML/MIN/1.73 M^2
FIO2: 28
FIO2: 50
FLOW: 2
GLUCOSE SERPL-MCNC: 103 MG/DL
GLUCOSE SERPL-MCNC: 250 MG/DL
HAV IGM SERPL QL IA: NEGATIVE
HBV CORE IGM SERPL QL IA: NEGATIVE
HBV SURFACE AG SERPL QL IA: NEGATIVE
HCO3 UR-SCNC: 32.1 MMOL/L (ref 24–28)
HCO3 UR-SCNC: 32.4 MMOL/L (ref 24–28)
HCO3 UR-SCNC: 32.7 MMOL/L (ref 24–28)
HCT VFR BLD AUTO: 24 %
HCV AB SERPL QL IA: NEGATIVE
HGB BLD-MCNC: 7.2 G/DL
HYPOCHROMIA BLD QL SMEAR: ABNORMAL
IMM GRANULOCYTES # BLD AUTO: 0.13 K/UL
IMM GRANULOCYTES NFR BLD AUTO: 0.7 %
IP: 12
IP: 12
LACTATE SERPL-SCNC: 1 MMOL/L
LACTATE SERPL-SCNC: 1.4 MMOL/L
LACTATE SERPL-SCNC: 1.5 MMOL/L
LACTATE SERPL-SCNC: 1.6 MMOL/L
LACTATE SERPL-SCNC: 1.9 MMOL/L
LYMPHOCYTES # BLD AUTO: 2.6 K/UL
LYMPHOCYTES NFR BLD: 14.7 %
MAGNESIUM SERPL-MCNC: 1.3 MG/DL
MAGNESIUM SERPL-MCNC: 2.3 MG/DL
MCH RBC QN AUTO: 27.9 PG
MCHC RBC AUTO-ENTMCNC: 30 G/DL
MCV RBC AUTO: 93 FL
MIN VOL: 9.9
MITOCHONDRIA AB TITR SER IF: NORMAL {TITER}
MITOCHONDRIA AB TITR SER IF: NORMAL {TITER}
MODE: ABNORMAL
MONOCYTES # BLD AUTO: 0.5 K/UL
MONOCYTES NFR BLD: 3 %
NEUTROPHILS # BLD AUTO: 14.2 K/UL
NEUTROPHILS NFR BLD: 81.4 %
NRBC BLD-RTO: 0 /100 WBC
PCO2 BLDA: 46.7 MMHG (ref 35–45)
PCO2 BLDA: 49 MMHG (ref 35–45)
PCO2 BLDA: 51.5 MMHG (ref 35–45)
PH SMN: 7.4 [PH] (ref 7.35–7.45)
PH SMN: 7.43 [PH] (ref 7.35–7.45)
PH SMN: 7.45 [PH] (ref 7.35–7.45)
PHOSPHATE SERPL-MCNC: 2.4 MG/DL
PHOSPHATE SERPL-MCNC: 3.1 MG/DL
PLATELET # BLD AUTO: 172 K/UL
PLATELET BLD QL SMEAR: ABNORMAL
PMV BLD AUTO: 12.7 FL
PO2 BLDA: 134 MMHG (ref 80–100)
PO2 BLDA: 166 MMHG (ref 80–100)
PO2 BLDA: 82 MMHG (ref 80–100)
POC BE: 7 MMOL/L
POC BE: 8 MMOL/L
POC BE: 8 MMOL/L
POC SATURATED O2: 100 % (ref 95–100)
POC SATURATED O2: 96 % (ref 95–100)
POC SATURATED O2: 99 % (ref 95–100)
POC TCO2: 34 MMOL/L (ref 23–27)
POCT GLUCOSE: 106 MG/DL (ref 70–110)
POCT GLUCOSE: 145 MG/DL (ref 70–110)
POCT GLUCOSE: 160 MG/DL (ref 70–110)
POCT GLUCOSE: 162 MG/DL (ref 70–110)
POCT GLUCOSE: 171 MG/DL (ref 70–110)
POCT GLUCOSE: 174 MG/DL (ref 70–110)
POCT GLUCOSE: 184 MG/DL (ref 70–110)
POCT GLUCOSE: 185 MG/DL (ref 70–110)
POCT GLUCOSE: 190 MG/DL (ref 70–110)
POCT GLUCOSE: 192 MG/DL (ref 70–110)
POCT GLUCOSE: 218 MG/DL (ref 70–110)
POCT GLUCOSE: 233 MG/DL (ref 70–110)
POCT GLUCOSE: 236 MG/DL (ref 70–110)
POCT GLUCOSE: 247 MG/DL (ref 70–110)
POCT GLUCOSE: 262 MG/DL (ref 70–110)
POIKILOCYTOSIS BLD QL SMEAR: SLIGHT
POLYCHROMASIA BLD QL SMEAR: ABNORMAL
POTASSIUM SERPL-SCNC: 2.9 MMOL/L
POTASSIUM SERPL-SCNC: 4.7 MMOL/L
PREALB SERPL-MCNC: 3 MG/DL
PROT SERPL-MCNC: 3.9 G/DL
PROT SERPL-MCNC: 5.4 G/DL
RBC # BLD AUTO: 2.58 M/UL
SAMPLE: ABNORMAL
SITE: ABNORMAL
SMOOTH MUSCLE AB TITR SER IF: NORMAL {TITER}
SODIUM SERPL-SCNC: 141 MMOL/L
SODIUM SERPL-SCNC: 144 MMOL/L
SP02: 100
SP02: 100
SP02: 99
SPONT RATE: 24
TARGETS BLD QL SMEAR: ABNORMAL
VANCOMYCIN SERPL-MCNC: 27 UG/ML
VANCOMYCIN TROUGH SERPL-MCNC: 33.6 UG/ML
WBC # BLD AUTO: 17.44 K/UL

## 2019-02-21 PROCEDURE — 20000000 HC ICU ROOM

## 2019-02-21 PROCEDURE — 25000003 PHARM REV CODE 250: Performed by: STUDENT IN AN ORGANIZED HEALTH CARE EDUCATION/TRAINING PROGRAM

## 2019-02-21 PROCEDURE — 85025 COMPLETE CBC W/AUTO DIFF WBC: CPT

## 2019-02-21 PROCEDURE — 63600175 PHARM REV CODE 636 W HCPCS: Performed by: HOSPITALIST

## 2019-02-21 PROCEDURE — 82803 BLOOD GASES ANY COMBINATION: CPT

## 2019-02-21 PROCEDURE — 80053 COMPREHEN METABOLIC PANEL: CPT

## 2019-02-21 PROCEDURE — 36415 COLL VENOUS BLD VENIPUNCTURE: CPT

## 2019-02-21 PROCEDURE — 94761 N-INVAS EAR/PLS OXIMETRY MLT: CPT

## 2019-02-21 PROCEDURE — 25000242 PHARM REV CODE 250 ALT 637 W/ HCPCS: Performed by: STUDENT IN AN ORGANIZED HEALTH CARE EDUCATION/TRAINING PROGRAM

## 2019-02-21 PROCEDURE — 84134 ASSAY OF PREALBUMIN: CPT

## 2019-02-21 PROCEDURE — 37799 UNLISTED PX VASCULAR SURGERY: CPT

## 2019-02-21 PROCEDURE — 63600175 PHARM REV CODE 636 W HCPCS: Performed by: STUDENT IN AN ORGANIZED HEALTH CARE EDUCATION/TRAINING PROGRAM

## 2019-02-21 PROCEDURE — 82140 ASSAY OF AMMONIA: CPT

## 2019-02-21 PROCEDURE — 25000003 PHARM REV CODE 250: Performed by: SURGERY

## 2019-02-21 PROCEDURE — 99233 SBSQ HOSP IP/OBS HIGH 50: CPT | Mod: 24,,, | Performed by: SURGERY

## 2019-02-21 PROCEDURE — 25000003 PHARM REV CODE 250: Performed by: PHYSICIAN ASSISTANT

## 2019-02-21 PROCEDURE — 80053 COMPREHEN METABOLIC PANEL: CPT | Mod: 91

## 2019-02-21 PROCEDURE — C1751 CATH, INF, PER/CENT/MIDLINE: HCPCS

## 2019-02-21 PROCEDURE — 83605 ASSAY OF LACTIC ACID: CPT | Mod: 91

## 2019-02-21 PROCEDURE — 93010 EKG 12-LEAD: ICD-10-PCS | Mod: ,,, | Performed by: INTERNAL MEDICINE

## 2019-02-21 PROCEDURE — 63600175 PHARM REV CODE 636 W HCPCS: Performed by: SURGERY

## 2019-02-21 PROCEDURE — 36410 VNPNXR 3YR/> PHY/QHP DX/THER: CPT

## 2019-02-21 PROCEDURE — P9045 ALBUMIN (HUMAN), 5%, 250 ML: HCPCS | Mod: JG | Performed by: STUDENT IN AN ORGANIZED HEALTH CARE EDUCATION/TRAINING PROGRAM

## 2019-02-21 PROCEDURE — 63600175 PHARM REV CODE 636 W HCPCS: Mod: JG | Performed by: STUDENT IN AN ORGANIZED HEALTH CARE EDUCATION/TRAINING PROGRAM

## 2019-02-21 PROCEDURE — 99233 PR SUBSEQUENT HOSPITAL CARE,LEVL III: ICD-10-PCS | Mod: GC,,, | Performed by: INTERNAL MEDICINE

## 2019-02-21 PROCEDURE — 84100 ASSAY OF PHOSPHORUS: CPT

## 2019-02-21 PROCEDURE — 83735 ASSAY OF MAGNESIUM: CPT | Mod: 91

## 2019-02-21 PROCEDURE — 80202 ASSAY OF VANCOMYCIN: CPT

## 2019-02-21 PROCEDURE — 80202 ASSAY OF VANCOMYCIN: CPT | Mod: 91

## 2019-02-21 PROCEDURE — 76937 US GUIDE VASCULAR ACCESS: CPT

## 2019-02-21 PROCEDURE — 27000221 HC OXYGEN, UP TO 24 HOURS

## 2019-02-21 PROCEDURE — 86256 FLUORESCENT ANTIBODY TITER: CPT | Mod: 91

## 2019-02-21 PROCEDURE — 92610 EVALUATE SWALLOWING FUNCTION: CPT

## 2019-02-21 PROCEDURE — A4216 STERILE WATER/SALINE, 10 ML: HCPCS | Performed by: SURGERY

## 2019-02-21 PROCEDURE — 25000003 PHARM REV CODE 250: Performed by: HOSPITALIST

## 2019-02-21 PROCEDURE — 99233 PR SUBSEQUENT HOSPITAL CARE,LEVL III: ICD-10-PCS | Mod: 24,,, | Performed by: SURGERY

## 2019-02-21 PROCEDURE — 94660 CPAP INITIATION&MGMT: CPT

## 2019-02-21 PROCEDURE — 94640 AIRWAY INHALATION TREATMENT: CPT

## 2019-02-21 PROCEDURE — 93010 ELECTROCARDIOGRAM REPORT: CPT | Mod: ,,, | Performed by: INTERNAL MEDICINE

## 2019-02-21 PROCEDURE — 93005 ELECTROCARDIOGRAM TRACING: CPT

## 2019-02-21 PROCEDURE — 80074 ACUTE HEPATITIS PANEL: CPT

## 2019-02-21 PROCEDURE — 86235 NUCLEAR ANTIGEN ANTIBODY: CPT

## 2019-02-21 PROCEDURE — 82330 ASSAY OF CALCIUM: CPT

## 2019-02-21 PROCEDURE — 86376 MICROSOMAL ANTIBODY EACH: CPT

## 2019-02-21 PROCEDURE — 86038 ANTINUCLEAR ANTIBODIES: CPT

## 2019-02-21 PROCEDURE — C9113 INJ PANTOPRAZOLE SODIUM, VIA: HCPCS | Performed by: STUDENT IN AN ORGANIZED HEALTH CARE EDUCATION/TRAINING PROGRAM

## 2019-02-21 PROCEDURE — 83735 ASSAY OF MAGNESIUM: CPT

## 2019-02-21 PROCEDURE — 99900035 HC TECH TIME PER 15 MIN (STAT)

## 2019-02-21 PROCEDURE — 99233 SBSQ HOSP IP/OBS HIGH 50: CPT | Mod: GC,,, | Performed by: INTERNAL MEDICINE

## 2019-02-21 PROCEDURE — 84100 ASSAY OF PHOSPHORUS: CPT | Mod: 91

## 2019-02-21 RX ORDER — HYDROMORPHONE HYDROCHLORIDE 1 MG/ML
0.5 INJECTION, SOLUTION INTRAMUSCULAR; INTRAVENOUS; SUBCUTANEOUS EVERY 4 HOURS PRN
Status: DISCONTINUED | OUTPATIENT
Start: 2019-02-21 | End: 2019-03-08

## 2019-02-21 RX ORDER — HYDROMORPHONE HYDROCHLORIDE 1 MG/ML
0.5 INJECTION, SOLUTION INTRAMUSCULAR; INTRAVENOUS; SUBCUTANEOUS ONCE
Status: COMPLETED | OUTPATIENT
Start: 2019-02-21 | End: 2019-02-21

## 2019-02-21 RX ORDER — FLUCONAZOLE 2 MG/ML
400 INJECTION, SOLUTION INTRAVENOUS
Status: DISCONTINUED | OUTPATIENT
Start: 2019-02-21 | End: 2019-02-25

## 2019-02-21 RX ORDER — VANCOMYCIN HCL IN 5 % DEXTROSE 1.25 G/25
1250 PLASTIC BAG, INJECTION (ML) INTRAVENOUS
Status: DISCONTINUED | OUTPATIENT
Start: 2019-02-21 | End: 2019-02-21

## 2019-02-21 RX ORDER — POTASSIUM CHLORIDE 14.9 MG/ML
20 INJECTION INTRAVENOUS
Status: COMPLETED | OUTPATIENT
Start: 2019-02-21 | End: 2019-02-21

## 2019-02-21 RX ORDER — ALBUMIN HUMAN 50 G/1000ML
50 SOLUTION INTRAVENOUS ONCE
Status: COMPLETED | OUTPATIENT
Start: 2019-02-21 | End: 2019-02-21

## 2019-02-21 RX ORDER — VANCOMYCIN HCL IN 5 % DEXTROSE 1G/250ML
1000 PLASTIC BAG, INJECTION (ML) INTRAVENOUS
Status: DISCONTINUED | OUTPATIENT
Start: 2019-02-22 | End: 2019-02-22

## 2019-02-21 RX ADMIN — HYDROMORPHONE HYDROCHLORIDE 0.5 MG: 1 INJECTION, SOLUTION INTRAMUSCULAR; INTRAVENOUS; SUBCUTANEOUS at 03:02

## 2019-02-21 RX ADMIN — FLUTICASONE FUROATE AND VILANTEROL TRIFENATATE 1 PUFF: 100; 25 POWDER RESPIRATORY (INHALATION) at 08:02

## 2019-02-21 RX ADMIN — HEPARIN SODIUM 5000 UNITS: 5000 INJECTION, SOLUTION INTRAVENOUS; SUBCUTANEOUS at 09:02

## 2019-02-21 RX ADMIN — IPRATROPIUM BROMIDE AND ALBUTEROL SULFATE 3 ML: .5; 3 SOLUTION RESPIRATORY (INHALATION) at 04:02

## 2019-02-21 RX ADMIN — ALBUMIN HUMAN 50 G: 0.05 INJECTION, SOLUTION INTRAVENOUS at 06:02

## 2019-02-21 RX ADMIN — ASPIRIN 81 MG CHEWABLE TABLET 81 MG: 81 TABLET CHEWABLE at 08:02

## 2019-02-21 RX ADMIN — MICONAZOLE NITRATE: 20 OINTMENT TOPICAL at 08:02

## 2019-02-21 RX ADMIN — HYDROMORPHONE HYDROCHLORIDE 0.5 MG: 1 INJECTION, SOLUTION INTRAMUSCULAR; INTRAVENOUS; SUBCUTANEOUS at 07:02

## 2019-02-21 RX ADMIN — HEPARIN SODIUM 5000 UNITS: 5000 INJECTION, SOLUTION INTRAVENOUS; SUBCUTANEOUS at 05:02

## 2019-02-21 RX ADMIN — DILTIAZEM HYDROCHLORIDE 60 MG: 30 TABLET, FILM COATED ORAL at 05:02

## 2019-02-21 RX ADMIN — PANTOPRAZOLE SODIUM 40 MG: 40 INJECTION, POWDER, FOR SOLUTION INTRAVENOUS at 08:02

## 2019-02-21 RX ADMIN — HYDROMORPHONE HYDROCHLORIDE 0.5 MG: 1 INJECTION, SOLUTION INTRAMUSCULAR; INTRAVENOUS; SUBCUTANEOUS at 09:02

## 2019-02-21 RX ADMIN — POTASSIUM CHLORIDE 20 MEQ: 200 INJECTION, SOLUTION INTRAVENOUS at 08:02

## 2019-02-21 RX ADMIN — POTASSIUM CHLORIDE 20 MEQ: 200 INJECTION, SOLUTION INTRAVENOUS at 09:02

## 2019-02-21 RX ADMIN — MAGNESIUM SULFATE HEPTAHYDRATE 3 G: 500 INJECTION, SOLUTION INTRAMUSCULAR; INTRAVENOUS at 08:02

## 2019-02-21 RX ADMIN — Medication 10 ML: at 12:02

## 2019-02-21 RX ADMIN — HYDROMORPHONE HYDROCHLORIDE 0.5 MG: 1 INJECTION, SOLUTION INTRAMUSCULAR; INTRAVENOUS; SUBCUTANEOUS at 10:02

## 2019-02-21 RX ADMIN — CLOPIDOGREL 75 MG: 75 TABLET, FILM COATED ORAL at 08:02

## 2019-02-21 RX ADMIN — PIPERACILLIN AND TAZOBACTAM 4.5 G: 4; .5 INJECTION, POWDER, LYOPHILIZED, FOR SOLUTION INTRAVENOUS; PARENTERAL at 06:02

## 2019-02-21 RX ADMIN — HEPARIN SODIUM 5000 UNITS: 5000 INJECTION, SOLUTION INTRAVENOUS; SUBCUTANEOUS at 02:02

## 2019-02-21 RX ADMIN — MICONAZOLE NITRATE: 20 OINTMENT TOPICAL at 09:02

## 2019-02-21 RX ADMIN — INSULIN ASPART 3 UNITS: 100 INJECTION, SOLUTION INTRAVENOUS; SUBCUTANEOUS at 04:02

## 2019-02-21 RX ADMIN — SODIUM CHLORIDE 1 UNITS/HR: 9 INJECTION, SOLUTION INTRAVENOUS at 09:02

## 2019-02-21 RX ADMIN — SODIUM CHLORIDE, SODIUM LACTATE, POTASSIUM CHLORIDE, AND CALCIUM CHLORIDE 500 ML: .6; .31; .03; .02 INJECTION, SOLUTION INTRAVENOUS at 01:02

## 2019-02-21 RX ADMIN — HYDROCORTISONE SODIUM SUCCINATE 100 MG: 100 INJECTION, POWDER, FOR SOLUTION INTRAMUSCULAR; INTRAVENOUS at 09:02

## 2019-02-21 RX ADMIN — STANDARDIZED SENNA CONCENTRATE AND DOCUSATE SODIUM 1 TABLET: 8.6; 5 TABLET, FILM COATED ORAL at 08:02

## 2019-02-21 RX ADMIN — POTASSIUM CHLORIDE 20 MEQ: 200 INJECTION, SOLUTION INTRAVENOUS at 10:02

## 2019-02-21 RX ADMIN — DILTIAZEM HYDROCHLORIDE 60 MG: 30 TABLET, FILM COATED ORAL at 02:02

## 2019-02-21 RX ADMIN — Medication 10 ML: at 05:02

## 2019-02-21 RX ADMIN — STANDARDIZED SENNA CONCENTRATE AND DOCUSATE SODIUM 1 TABLET: 8.6; 5 TABLET, FILM COATED ORAL at 09:02

## 2019-02-21 RX ADMIN — HYDROCORTISONE SODIUM SUCCINATE 100 MG: 100 INJECTION, POWDER, FOR SOLUTION INTRAMUSCULAR; INTRAVENOUS at 05:02

## 2019-02-21 RX ADMIN — IPRATROPIUM BROMIDE AND ALBUTEROL SULFATE 3 ML: .5; 3 SOLUTION RESPIRATORY (INHALATION) at 08:02

## 2019-02-21 RX ADMIN — POTASSIUM CHLORIDE, DEXTROSE MONOHYDRATE AND SODIUM CHLORIDE: 150; 5; 450 INJECTION, SOLUTION INTRAVENOUS at 11:02

## 2019-02-21 RX ADMIN — LACTULOSE 20 G: 20 SOLUTION ORAL at 09:02

## 2019-02-21 RX ADMIN — PIPERACILLIN AND TAZOBACTAM 4.5 G: 4; .5 INJECTION, POWDER, LYOPHILIZED, FOR SOLUTION INTRAVENOUS; PARENTERAL at 03:02

## 2019-02-21 RX ADMIN — PIPERACILLIN AND TAZOBACTAM 4.5 G: 4; .5 INJECTION, POWDER, LYOPHILIZED, FOR SOLUTION INTRAVENOUS; PARENTERAL at 11:02

## 2019-02-21 RX ADMIN — FLUCONAZOLE, SODIUM CHLORIDE 400 MG: 2 INJECTION INTRAVENOUS at 10:02

## 2019-02-21 RX ADMIN — INDOMETHACIN 75 MG: 75 CAPSULE, EXTENDED RELEASE ORAL at 08:02

## 2019-02-21 RX ADMIN — IPRATROPIUM BROMIDE AND ALBUTEROL SULFATE 3 ML: .5; 3 SOLUTION RESPIRATORY (INHALATION) at 12:02

## 2019-02-21 RX ADMIN — POTASSIUM PHOSPHATE, MONOBASIC AND POTASSIUM PHOSPHATE, DIBASIC 30 MMOL: 224; 236 INJECTION, SOLUTION, CONCENTRATE INTRAVENOUS at 09:02

## 2019-02-21 RX ADMIN — LACTULOSE 20 G: 20 SOLUTION ORAL at 08:02

## 2019-02-21 RX ADMIN — DILTIAZEM HYDROCHLORIDE 60 MG: 30 TABLET, FILM COATED ORAL at 09:02

## 2019-02-21 RX ADMIN — HYDROCORTISONE SODIUM SUCCINATE 100 MG: 100 INJECTION, POWDER, FOR SOLUTION INTRAMUSCULAR; INTRAVENOUS at 02:02

## 2019-02-21 NOTE — PROGRESS NOTES
"[facility]  General Surgery  Progress Note    Subjective:     History of Present Illness:  64 yo W with a history of HTN, COPD on home oxygen, HFpEF, IDDMII, CVA on plavix, HTN, debility after fall and broken hip, and PE who presents to the ED with a several month history of nausea, vomiting, inability to tolerate a diet and weight loss. She has had multiple admissions in the past few months for different ailments. She presents today with continued nausea, vomiting and abdominal pain that is mostly worse in the RLQ. During the examination, she states her pain was all over her abdomen because she was retching so much. She states that she has lost close to 40lbs since her surgery and that she only able to keep broth down. She had a lap converted to open appendectomy back in August 2018 that was complicated by a wound infection, C diff and failure to thrive. This seems to persists. She is now wheelchair bound (per her) and apparently lives in [place] but is here in [place] with her daughter.    She had a CT scan in the ED which revealed an irregular shaped rim enhancing fluid collection measuring at least 4.0 x 3.0 cm ight hemipelvis at the site of prior appendectomy. Surgery was consulted for further recommendations. She was also noted to have a "knot" at the RLQ incision site that is also tender.    Post-Op Info:  Procedure(s) (LRB):  LAPAROTOMY, EXPLORATORY (N/A)  INCISION AND DRAINAGE, ABSCESS-  drainage of pelvic abscess (N/A)  EXCISION, ABSCESS- drainage abdominal wall abscess (N/A)  APPLICATION, WOUND VAC (N/A)   27 Days Post-Op     Interval History:   No acute events last night but still on bipap. Moans to pain but doesn't follow other commands. ABGs improved. RUQ US showed thickened gallbladder wall and is equivocal for acute acalculous cholecystitis. Hepatology consulted yesterday. Endocrinology also consulted.    Medications:  Continuous Infusions:   dextrose 5 % and 0.45 % NaCl with " KCl 20 mEq 75 mL/hr at 02/21/19 0600     Scheduled Meds:   albuterol-ipratropium  3 mL Nebulization Q4H WAKE    aspirin  81 mg Oral Daily    clopidogrel  75 mg Oral Daily    diltiaZEM  60 mg Per NG tube Q8H    fluticasone-vilanterol  1 puff Inhalation Daily    heparin (porcine)  5,000 Units Subcutaneous Q8H    hydrocortisone sodium succinate  100 mg Intravenous Q8H    indomethacin  75 mg Oral BID    lactulose  20 g Per G Tube BID    miconazole nitrate 2%   Topical (Top) BID    pantoprozole (PROTONIX) IV  40 mg Intravenous Daily    piperacillin-tazobactam (ZOSYN) IVPB  4.5 g Intravenous Q8H    senna-docusate 8.6-50 mg  1 tablet Per G Tube BID    sodium chloride 0.9%  10 mL Intravenous Q6H    vancomycin (VANCOCIN) IVPB  15 mg/kg Intravenous Q12H     PRN Meds:calcium gluconate IVPB, calcium gluconate IVPB, calcium gluconate IVPB, dextrose 50%, diclofenac sodium, diphenhydrAMINE, glucagon (human recombinant), hydrALAZINE, insulin aspart U-100, magnesium sulfate IVPB, magnesium sulfate IVPB, omnipaque, omnipaque, ondansetron, potassium chloride 10%, potassium chloride 10%, potassium chloride 10%, potassium, sodium phosphates, potassium, sodium phosphates, potassium, sodium phosphates, Flushing PICC Protocol **AND** sodium chloride 0.9% **AND** sodium chloride 0.9%, sodium chloride 0.9%     Review of patient's allergies indicates:   Allergen Reactions    Ace inhibitors Swelling    Carvedilol      Other reaction(s): Other (See Comments)  blister    Hydralazine analogues     Metformin Nausea And Vomiting    Tetracycline Itching    Tetracyclines Swelling    Travatan (with benzalkonium) [travoprost (benzalkonium)]     Travoprost Itching     Other reaction(s): Other (See Comments)  Blurry vision     Objective:     Vital Signs (Most Recent):  Temp: 98.6 °F (37 °C) (02/21/19 0300)  Pulse: 60 (02/21/19 0706)  Resp: (!) 30 (02/21/19 0706)  BP: 127/63 (02/21/19 0000)  SpO2: 100 % (02/21/19 0706) Vital Signs  (24h Range):  Temp:  [97.8 °F (36.6 °C)-98.6 °F (37 °C)] 98.6 °F (37 °C)  Pulse:  [] 60  Resp:  [12-57] 30  SpO2:  [88 %-100 %] 100 %  BP: (102-155)/(58-82) 127/63  Arterial Line BP: (105-161)/(43-59) 140/58     Weight: 83 kg (183 lb)  Body mass index is 34.58 kg/m².    Intake/Output - Last 3 Shifts       02/19 0700 - 02/20 0659 02/20 0700 - 02/21 0659 02/21 0700 - 02/22 0659    P.O.       I.V. (mL/kg) 697 (9) 2020.1 (24.3)     NG/ 285     IV Piggyback 2100 3050     Total Intake(mL/kg) 2997 (38.9) 5355.1 (64.5)     Urine (mL/kg/hr) 417 (0.2) 500 (0.3)     Emesis/NG output       Drains 30      Stool 0 350     Total Output 447 850     Net +2550 +4505.1            Stool Occurrence 3 x 1 x           Physical Exam   Constitutional: She appears well-developed and well-nourished. She appears distressed.   HENT:   Head: Normocephalic and atraumatic.   Cardiovascular: Normal rate and regular rhythm.   Pulmonary/Chest:   On bipap   Abdominal: Soft.   Soft, diffusely tender, moaning to palpation, midline firmness  Multiple areas of excoriation and skin breakdown noted to anterior abdomen   Musculoskeletal:   Difficult to examine extremities - R hand and arm swollen and moans in pain when trying to examine any part of her body   Neurological:   Not following commands, groaning in pain   Skin: Skin is warm and dry.   Excoriations on abdomen and buttocks   Psychiatric:   Opens eyes and moans to stimulation   Vitals reviewed.    Significant Labs:  CBC:   Recent Labs   Lab 02/21/19  0315   WBC 17.44*   RBC 2.58*   HGB 7.2*   HCT 24.0*      MCV 93   MCH 27.9   MCHC 30.0*     BMP:   Recent Labs   Lab 02/21/19  0315   *      K 4.7      CO2 34*   BUN 10   CREATININE 0.9   CALCIUM 9.9   MG 2.3     CMP:   Recent Labs   Lab 02/21/19  0315   *   CALCIUM 9.9   ALBUMIN 1.7*   PROT 5.4*      K 4.7   CO2 34*      BUN 10   CREATININE 0.9   ALKPHOS 1,645*   ALT 19   AST 93*   BILITOT 5.4*      LFTs:   Recent Labs   Lab 02/21/19  0315   ALT 19   AST 93*   ALKPHOS 1,645*   BILITOT 5.4*   PROT 5.4*   ALBUMIN 1.7*     Coagulation:   Recent Labs   Lab 02/18/19  1208 02/19/19  1441   LABPROT 11.8 11.6   INR 1.2 1.1   APTT 32.3*  --      ABGs:   Recent Labs   Lab 02/21/19  0706   PH 7.432   PCO2 49.0*   PO2 134*   HCO3 32.7*   POCSATURATED 99   BE 8     RUQ US 2/20  FINDINGS:  Visualized portion of the pancreas are unremarkable.    Gallbladder wall is diffusely thickened measuring 1.1 cm.  There is no wall hyperemia.  No calcified gallstones are identified.  No pericholecystic fluid.  Sonographic Flores sign is equivocal due to patient's clinical status.  Small right pleural effusion.  Spleen measures 7.2 x 2.6 cm.  Visualized portions the liver are unremarkable.      Impression       Diffusely thickened gallbladder wall.  Equivocal assessment for acute cholecystitis.  Differential considerations include acalculous cholecystitis, or sequela due to hepatic dysfunction or cardiac dysfunction.  Advise clinical correlation.    Small right pleural effusion.         Assessment/Plan:     Acute respiratory failure with hypoxia and hypercarbia    Try to wean off bipap     Bilateral lower extremity edema    US this morning ruled out DVT     Abnormal thyroid function test    Endocrinology consulted: recommend against starting thyroid replacement hormone  Also recommended stopping hydrocortisone     Acute cystitis    UA showing many bacteria and 2+ leukocytes - nieves in place, not speciated yet  - this morning cultures updates - growing yeast. Started fluconazole IV.     Neurological finding    - appreciate vascular neurology assistance  - likely acute encephalopathy related to infectious process      Urinary incontinence without sensory awareness    Nieves in place     Elevated liver enzymes    -LFTs, Tbili and Alk phos all increased  -CT showed biliary sludge  -consult hepatology - per rec's this is severe cholestasis  and likely related to medication or infection, not underlying hepatic pathology  - RUQ US on 2/20 showed thickened gallbladder wall and was equivocal for acute acalculous cholecystitis  - ammonia down to 50 today  - will d/w team - would need IR percutaneous cholecystostomy tube     On enteral nutrition    TFs currently on hold     Dysarthria    -PEG placed 2/11/19     Multiple skin tears    -Wound care following     Adverse effect of adrenal cortical steroids    Currently on hydrocortisone 100 TID, this could be in part causing leukocytosis     Depression    -Continue Cymbalta     Debility    -PT/OT; history of fall with non displaced oblique right tibia fracture at metaphysis into diaphysis. Was wearing brace.  -Severely debilited; needs placement. SW consulted and following       Acute renal failure superimposed on stage 3 chronic kidney disease    Had been getting PO lasix until yesterday morning  Now UOP only about 5-10 cc/h - rec'd multiple fluid boluses overnight.     Severe malnutrition    -Continue tube feeds if pt able to tolerate otherwise will need to consider an alternate source of nutrition although may want to avoid TPN with current hepatic state     Gastroesophageal reflux disease without esophagitis    -Continue BID protonix     (HFpEF) heart failure with preserved ejection fraction    Last echo 8/2018 with no WMAs, grade 1DD, EF 60%  -Strict I/O, daily weights  -holding diuresis today     Moderate asthma without complication    -Continue with scheduled duonebs  -on BiPAP this AM - O2 and CO2 improved     Elevated troponin    -Cardiology consulted. On ASA/plavix at home     CAD (coronary artery disease)    -ASA, plavix     Type 2 diabetes mellitus    -SSI, appreciate endocrine assistance          Cassie Choudhary MD  General Surgery  [facility]

## 2019-02-21 NOTE — SUBJECTIVE & OBJECTIVE
"Interval HPI:   Overnight events: No acute overnight events. Patient more arousal today although still not following commands. Abdominal US demonstrated diffuse gallbladder wall thickening   Eating:   NPO  Nausea: No  Hypoglycemia and intervention: No  Fever: No  TPN and/or TF: No    /63   Pulse 62   Temp 98.6 °F (37 °C) (Oral)   Resp (!) 23   Ht 5' 1" (1.549 m)   Wt 83 kg (183 lb)   LMP  (LMP Unknown)   SpO2 97%   Breastfeeding? No   BMI 34.58 kg/m²     Labs Reviewed and Include    Recent Labs   Lab 02/21/19  0315   *   CALCIUM 9.9   ALBUMIN 1.7*   PROT 5.4*      K 4.7   CO2 34*      BUN 10   CREATININE 0.9   ALKPHOS 1,645*   ALT 19   AST 93*   BILITOT 5.4*     Lab Results   Component Value Date    WBC 17.44 (H) 02/21/2019    HGB 7.2 (L) 02/21/2019    HCT 24.0 (L) 02/21/2019    MCV 93 02/21/2019     02/21/2019     Recent Labs   Lab 02/19/19  1441   TSH 4.718*   FREET4 0.70*     Lab Results   Component Value Date    HGBA1C 4.7 01/24/2019       Nutritional status:   Body mass index is 34.58 kg/m².  Lab Results   Component Value Date    ALBUMIN 1.7 (L) 02/21/2019    ALBUMIN 1.4 (L) 02/20/2019    ALBUMIN 1.4 (L) 02/19/2019     Lab Results   Component Value Date    PREALBUMIN 3 (L) 02/21/2019    PREALBUMIN 4 (L) 02/18/2019    PREALBUMIN 5 (L) 02/14/2019       Estimated Creatinine Clearance: 62.5 mL/min (based on SCr of 0.9 mg/dL).    Accu-Checks  Recent Labs     02/19/19  2302 02/20/19  0259 02/20/19  1152 02/20/19  1638 02/20/19  2034 02/20/19  2304 02/20/19  2312 02/21/19  0433 02/21/19  0934 02/21/19  1104   POCTGLUCOSE 185* 130* 156* 195* 200* 330* 233* 262* 236* 236*       Current Medications and/or Treatments Impacting Glycemic Control  Immunotherapy:    Immunosuppressants     None        Steroids:   Hormones (From admission, onward)    Start     Stop Route Frequency Ordered    02/20/19 1115  hydrocortisone sodium succinate injection 100 mg      02/25 1359 IV Every 8 hours " 02/20/19 1102        Pressors:    Autonomic Drugs (From admission, onward)    None        Hyperglycemia/Diabetes Medications:   Antihyperglycemics (From admission, onward)    Start     Stop Route Frequency Ordered    02/21/19 1015  insulin regular (Humulin R) 100 Units in sodium chloride 0.9% 100 mL infusion     Question:  Insulin Rate Adjustment (DO NOT MODIFY ANSWER)  Answer:  [email or web address]    -- IV Continuous 02/21/19 0915    02/17/19 0902  insulin aspart U-100 pen 0-5 Units      -- SubQ Every 4 hours PRN 02/17/19 0803

## 2019-02-21 NOTE — PROCEDURES
"Sheryl Martines is a 63 y.o. female patient.    Temp: 98.6 °F (37 °C) (02/20/19 1900)  Pulse: 66 (02/20/19 2200)  Resp: (!) 26 (02/20/19 2200)  BP: 102/73 (02/20/19 2200)  SpO2: 100 % (02/20/19 2200)  Weight: 77.1 kg (170 lb) (02/20/19 0500)  Height: 5' 1" (154.9 cm) (01/26/19 0900)       Arterial Line  Date/Time: 2/20/2019 10:28 PM  Performed by: Mauricio Ott MD  Authorized by: Mauricio Ott MD   Consent Done: Yes  Consent: Verbal consent obtained. Written consent not obtained.  Risks and benefits: risks, benefits and alternatives were discussed  Consent given by: power of   Indications: multiple ABGs, respiratory failure and hemodynamic monitoring  Location: right radial    Anesthesia:  Local Anesthetic: lidocaine 1% with epinephrine  Number of attempts: 2  Complications: No  Specimens: No  Implants: No  Post-procedure: line sutured and dressing applied          Mauricio Ott  2/20/2019  "

## 2019-02-21 NOTE — PLAN OF CARE
Problem: Adult Inpatient Plan of Care  Goal: Plan of Care Review  Outcome: Ongoing (interventions implemented as appropriate)  Patient VSS, free from acute events this shift. Patient responding to repeated stimulation. Tracks with eyes. Not following commands. Arterial line placed beginning of shift. MAP's >65. US of lower extremities obtained. Plan of care discussed with patient and family. All questions answered, will continue to monitor.

## 2019-02-21 NOTE — PROGRESS NOTES
Pt taken off of BIPAP per MD verbal order.  Pt placed on 2L nasal cannula.  Pt tolerated well.  Will continue to monitor.

## 2019-02-21 NOTE — CONSULTS
Placed 18g X 8cm midline in left brachial vein of LUE using realtime ultrasound guidance. Lot#[id number]. Remove on or before 03/22/2019.

## 2019-02-21 NOTE — ASSESSMENT & PLAN NOTE
BG goal 140 - 180   Currently NPO, on D5 +1/2 NS @75cc/hr  Will switch to intensive insulin gtt as BG & on steroids

## 2019-02-21 NOTE — ASSESSMENT & PLAN NOTE
-LFTs, Tbili and Alk phos all increased  -CT showed biliary sludge  -consult hepatology - per rec's this is severe cholestasis and likely related to medication or infection, not underlying hepatic pathology  - RUQ US on 2/20 showed thickened gallbladder wall and was equivocal for acute acalculous cholecystitis  - ammonia down to 50 today  - will d/w team - would need IR percutaneous cholecystostomy tube

## 2019-02-21 NOTE — ASSESSMENT & PLAN NOTE
Had been getting PO lasix until yesterday morning  Now UOP only about 5-10 cc/h - rec'd multiple fluid boluses overnight.

## 2019-02-21 NOTE — SUBJECTIVE & OBJECTIVE
Interval History:   No acute events last night but still on bipap. Moans to pain but doesn't follow other commands. ABGs improved. RUQ US showed thickened gallbladder wall and is equivocal for acute acalculous cholecystitis. Hepatology consulted yesterday. Endocrinology also consulted.    Medications:  Continuous Infusions:   dextrose 5 % and 0.45 % NaCl with KCl 20 mEq 75 mL/hr at 02/21/19 0600     Scheduled Meds:   albuterol-ipratropium  3 mL Nebulization Q4H WAKE    aspirin  81 mg Oral Daily    clopidogrel  75 mg Oral Daily    diltiaZEM  60 mg Per NG tube Q8H    fluticasone-vilanterol  1 puff Inhalation Daily    heparin (porcine)  5,000 Units Subcutaneous Q8H    hydrocortisone sodium succinate  100 mg Intravenous Q8H    indomethacin  75 mg Oral BID    lactulose  20 g Per G Tube BID    miconazole nitrate 2%   Topical (Top) BID    pantoprozole (PROTONIX) IV  40 mg Intravenous Daily    piperacillin-tazobactam (ZOSYN) IVPB  4.5 g Intravenous Q8H    senna-docusate 8.6-50 mg  1 tablet Per G Tube BID    sodium chloride 0.9%  10 mL Intravenous Q6H    vancomycin (VANCOCIN) IVPB  15 mg/kg Intravenous Q12H     PRN Meds:calcium gluconate IVPB, calcium gluconate IVPB, calcium gluconate IVPB, dextrose 50%, diclofenac sodium, diphenhydrAMINE, glucagon (human recombinant), hydrALAZINE, insulin aspart U-100, magnesium sulfate IVPB, magnesium sulfate IVPB, omnipaque, omnipaque, ondansetron, potassium chloride 10%, potassium chloride 10%, potassium chloride 10%, potassium, sodium phosphates, potassium, sodium phosphates, potassium, sodium phosphates, Flushing PICC Protocol **AND** sodium chloride 0.9% **AND** sodium chloride 0.9%, sodium chloride 0.9%     Review of patient's allergies indicates:   Allergen Reactions    Ace inhibitors Swelling    Carvedilol      Other reaction(s): Other (See Comments)  blister    Hydralazine analogues     Metformin Nausea And Vomiting    Tetracycline Itching    Tetracyclines  Swelling    Travatan (with benzalkonium) [travoprost (benzalkonium)]     Travoprost Itching     Other reaction(s): Other (See Comments)  Blurry vision     Objective:     Vital Signs (Most Recent):  Temp: 98.6 °F (37 °C) (02/21/19 0300)  Pulse: 60 (02/21/19 0706)  Resp: (!) 30 (02/21/19 0706)  BP: 127/63 (02/21/19 0000)  SpO2: 100 % (02/21/19 0706) Vital Signs (24h Range):  Temp:  [97.8 °F (36.6 °C)-98.6 °F (37 °C)] 98.6 °F (37 °C)  Pulse:  [] 60  Resp:  [12-57] 30  SpO2:  [88 %-100 %] 100 %  BP: (102-155)/(58-82) 127/63  Arterial Line BP: (105-161)/(43-59) 140/58     Weight: 83 kg (183 lb)  Body mass index is 34.58 kg/m².    Intake/Output - Last 3 Shifts       02/19 0700 - 02/20 0659 02/20 0700 - 02/21 0659 02/21 0700 - 02/22 0659    P.O.       I.V. (mL/kg) 697 (9) 2020.1 (24.3)     NG/ 285     IV Piggyback 2100 3050     Total Intake(mL/kg) 2997 (38.9) 5355.1 (64.5)     Urine (mL/kg/hr) 417 (0.2) 500 (0.3)     Emesis/NG output       Drains 30      Stool 0 350     Total Output 447 850     Net +2550 +4505.1            Stool Occurrence 3 x 1 x           Physical Exam   Constitutional: She appears well-developed and well-nourished. She appears distressed.   HENT:   Head: Normocephalic and atraumatic.   Cardiovascular: Normal rate and regular rhythm.   Pulmonary/Chest:   On bipap   Abdominal: Soft.   Soft, diffusely tender, moaning to palpation, midline firmness  Multiple areas of excoriation and skin breakdown noted to anterior abdomen   Musculoskeletal:   Difficult to examine extremities - R hand and arm swollen and moans in pain when trying to examine any part of her body   Neurological:   Not following commands, groaning in pain   Skin: Skin is warm and dry.   Excoriations on abdomen and buttocks   Psychiatric:   Opens eyes and moans to stimulation   Vitals reviewed.    Significant Labs:  CBC:   Recent Labs   Lab 02/21/19  0315   WBC 17.44*   RBC 2.58*   HGB 7.2*   HCT 24.0*      MCV 93   MCH  27.9   MCHC 30.0*     BMP:   Recent Labs   Lab 02/21/19  0315   *      K 4.7      CO2 34*   BUN 10   CREATININE 0.9   CALCIUM 9.9   MG 2.3     CMP:   Recent Labs   Lab 02/21/19  0315   *   CALCIUM 9.9   ALBUMIN 1.7*   PROT 5.4*      K 4.7   CO2 34*      BUN 10   CREATININE 0.9   ALKPHOS 1,645*   ALT 19   AST 93*   BILITOT 5.4*     LFTs:   Recent Labs   Lab 02/21/19  0315   ALT 19   AST 93*   ALKPHOS 1,645*   BILITOT 5.4*   PROT 5.4*   ALBUMIN 1.7*     Coagulation:   Recent Labs   Lab 02/18/19  1208 02/19/19  1441   LABPROT 11.8 11.6   INR 1.2 1.1   APTT 32.3*  --      ABGs:   Recent Labs   Lab 02/21/19  0706   PH 7.432   PCO2 49.0*   PO2 134*   HCO3 32.7*   POCSATURATED 99   BE 8     RUQ US 2/20  FINDINGS:  Visualized portion of the pancreas are unremarkable.    Gallbladder wall is diffusely thickened measuring 1.1 cm.  There is no wall hyperemia.  No calcified gallstones are identified.  No pericholecystic fluid.  Sonographic Flores sign is equivocal due to patient's clinical status.  Small right pleural effusion.  Spleen measures 7.2 x 2.6 cm.  Visualized portions the liver are unremarkable.      Impression       Diffusely thickened gallbladder wall.  Equivocal assessment for acute cholecystitis.  Differential considerations include acalculous cholecystitis, or sequela due to hepatic dysfunction or cardiac dysfunction.  Advise clinical correlation.    Small right pleural effusion.

## 2019-02-21 NOTE — PLAN OF CARE
Problem: SLP Goal  Goal: SLP Goal  Speech Language Pathology Goals  Goals expected to be met by 2/28/19  1. Pt will participate in further, instrumental assessment via MBSS   2. Educate Pt and family on S/S aspiration and aspiration precautions   3. Pt will complete dysphagia exercises x10 ea with good effort 90% of attempts to improve BOT coordination and pharyngeal strength/coordination.               Bedside swallow evaluation completed with implemented plan of care.   Emily P. Abadie M.S., CCC-SLP  Speech Language Pathologist  [phone]  02/21/2019

## 2019-02-21 NOTE — ASSESSMENT & PLAN NOTE
Endocrinology consulted: recommend against starting thyroid replacement hormone  Also recommended stopping hydrocortisone

## 2019-02-21 NOTE — SUBJECTIVE & OBJECTIVE
Interval History/Significant Events: On BiPAP for most of the night but now on nasal canula. Had very brief episode of bradycardia down to 40s with desat last night which reversed very quickly. Lactate initially up to 2.6 at that time although she responded well to fluids and is now at 1.9.     Follow-up For: Procedure(s) (LRB):  LAPAROTOMY, EXPLORATORY (N/A)  INCISION AND DRAINAGE, ABSCESS-  drainage of pelvic abscess (N/A)  EXCISION, ABSCESS- drainage abdominal wall abscess (N/A)  APPLICATION, WOUND VAC (N/A)    Post-Operative Day: 27 Days Post-Op    Objective:     Vital Signs (Most Recent):  Temp: 98.6 °F (37 °C) (02/21/19 0300)  Pulse: 62 (02/21/19 0910)  Resp: (!) 23 (02/21/19 0910)  BP: 127/63 (02/21/19 0000)  SpO2: 97 % (02/21/19 0910) Vital Signs (24h Range):  Temp:  [97.8 °F (36.6 °C)-98.6 °F (37 °C)] 98.6 °F (37 °C)  Pulse:  [] 62  Resp:  [15-57] 23  SpO2:  [88 %-100 %] 97 %  BP: (102-155)/(58-82) 127/63  Arterial Line BP: (105-161)/(43-59) 140/58     Weight: 83 kg (183 lb)  Body mass index is 34.58 kg/m².      Intake/Output Summary (Last 24 hours) at 2/21/2019 1039  Last data filed at 2/21/2019 1000  Gross per 24 hour   Intake 4235.1 ml   Output 875 ml   Net 3360.1 ml       Physical Exam   Constitutional: She appears well-developed and well-nourished. She appears distressed.   HENT:   Head: Normocephalic and atraumatic.   Cardiovascular: Normal rate and regular rhythm.   Pulmonary/Chest:   On bipap   Abdominal: Soft.   Soft, diffusely tender, moaning to palpation, midline firmness  Multiple areas of excoriation and skin breakdown noted to anterior abdomen   Musculoskeletal:   Difficult to examine extremities - R hand and arm swollen and moans in pain when trying to examine any part of her body   Neurological:   Not following commands, groaning in pain   Skin: Skin is warm and dry.   Excoriations on abdomen and buttocks   Psychiatric:   Opens eyes and moans to stimulation   Nursing note and vitals  reviewed.      Vents:  Vent Mode: A/C (01/29/19 0140)  Ventilator Initiated: Yes (01/26/19 0239)  Set Rate: 15 bmp (01/29/19 0140)  Vt Set: 450 mL (01/29/19 0140)  Pressure Support: 5 cmH20 (01/29/19 0140)  PEEP/CPAP: 5 cmH20 (01/28/19 1050)  Oxygen Concentration (%): 50 (02/21/19 0706)  Peak Airway Pressure: 0 cmH2O (01/29/19 0140)  Plateau Pressure: 0 cmH20 (01/29/19 0140)  Total Ve: 0 mL (01/29/19 0140)  F/VT Ratio<105 (RSBI): (!) 26.57 (01/28/19 1050)    Lines/Drains/Airways     Central Venous Catheter Line                 Port A Cath Single Lumen 02/17/19 0930 right subclavian 4 days          Drain                 Gastrostomy/Enterostomy 02/11/19 LUQ 10 days         Urethral Catheter 02/17/19 1507 Straight-tip 16 Fr. 3 days         Rectal Tube 02/20/19 1600 rectal tube w/ balloon (indicate number of mLs) less than 1 day          Arterial Line                 Arterial Line 02/20/19 2030 Right Radial less than 1 day                Significant Labs:    CBC/Anemia Profile:  Recent Labs   Lab 02/20/19  0300 02/20/19 1920 02/21/19  0315   WBC 17.59* 17.40* 17.44*   HGB 8.1* 7.4* 7.2*   HCT 27.5* 25.5* 24.0*    161 172   MCV 95 96 93   RDW 17.9* 18.0* 17.8*        Chemistries:  Recent Labs   Lab 02/19/19  1441 02/20/19  0300 02/20/19 1920 02/21/19  0315   * 146* 143 141   K 4.0 4.0 4.9 4.7    104 103 103   CO2 36* 35* 30* 34*   BUN 8 8 9 10   CREATININE 0.8 0.8 0.9 0.9   CALCIUM 10.0 10.4 10.3 9.9   ALBUMIN 1.4* 1.4*  --  1.7*   PROT 5.5* 5.6*  --  5.4*   BILITOT 4.1* 4.8*  --  5.4*   ALKPHOS 1,648* 1,747*  --  1,645*   ALT 23 23  --  19   AST 93* 103*  --  93*   MG 2.0 1.7 1.8 2.3   PHOS 2.3* 2.4* 3.1 3.1         Significant Imaging:  I have reviewed and interpreted all pertinent imaging results/findings within the past 24 hours.

## 2019-02-21 NOTE — PROGRESS NOTES
[facility]  Critical Care - Surgery  Progress Note    Patient Name: Sheryl Martines  MRN: [id number]  Admission Date: 1/23/2019  Hospital Length of Stay: 28 days  Code Status: Full Code  Attending Provider: Monster Laurent MD  Primary Care Provider: Primary Doctor No   Principal Problem: Somnolence    Subjective:     Hospital/ICU Course:  02/19/19: Arrived to the SICU on BiPAP after rapid response was called to patient's room (1007). At that time patient was minimally interactive.  02/20/19: Patient remains somnolent overnight and on BiPAP. Lactic acid peaked at 3.0, remains on broad spectrum Abx, UA dirty, Urine Cx sent, Blood Cxs pending.    Interval History/Significant Events: On BiPAP for most of the night but now on nasal canula. Had very brief episode of bradycardia down to 40s with desat last night which reversed very quickly. Lactate initially up to 2.6 at that time although she responded well to fluids and is now at 1.9.     Follow-up For: Procedure(s) (LRB):  LAPAROTOMY, EXPLORATORY (N/A)  INCISION AND DRAINAGE, ABSCESS-  drainage of pelvic abscess (N/A)  EXCISION, ABSCESS- drainage abdominal wall abscess (N/A)  APPLICATION, WOUND VAC (N/A)    Post-Operative Day: 27 Days Post-Op    Objective:     Vital Signs (Most Recent):  Temp: 98.6 °F (37 °C) (02/21/19 0300)  Pulse: 62 (02/21/19 0910)  Resp: (!) 23 (02/21/19 0910)  BP: 127/63 (02/21/19 0000)  SpO2: 97 % (02/21/19 0910) Vital Signs (24h Range):  Temp:  [97.8 °F (36.6 °C)-98.6 °F (37 °C)] 98.6 °F (37 °C)  Pulse:  [] 62  Resp:  [15-57] 23  SpO2:  [88 %-100 %] 97 %  BP: (102-155)/(58-82) 127/63  Arterial Line BP: (105-161)/(43-59) 140/58     Weight: 83 kg (183 lb)  Body mass index is 34.58 kg/m².      Intake/Output Summary (Last 24 hours) at 2/21/2019 1039  Last data filed at 2/21/2019 1000  Gross per 24 hour   Intake 4235.1 ml   Output 875 ml   Net 3360.1 ml       Physical Exam   Constitutional: She appears well-developed and  well-nourished. She appears distressed.   HENT:   Head: Normocephalic and atraumatic.   Cardiovascular: Normal rate and regular rhythm.   Pulmonary/Chest:   On bipap   Abdominal: Soft.   Soft, diffusely tender, moaning to palpation, midline firmness  Multiple areas of excoriation and skin breakdown noted to anterior abdomen   Musculoskeletal:   Difficult to examine extremities - R hand and arm swollen and moans in pain when trying to examine any part of her body   Neurological:   Not following commands, groaning in pain   Skin: Skin is warm and dry.   Excoriations on abdomen and buttocks   Psychiatric:   Opens eyes and moans to stimulation   Nursing note and vitals reviewed.      Vents:  Vent Mode: A/C (01/29/19 0140)  Ventilator Initiated: Yes (01/26/19 0239)  Set Rate: 15 bmp (01/29/19 0140)  Vt Set: 450 mL (01/29/19 0140)  Pressure Support: 5 cmH20 (01/29/19 0140)  PEEP/CPAP: 5 cmH20 (01/28/19 1050)  Oxygen Concentration (%): 50 (02/21/19 0706)  Peak Airway Pressure: 0 cmH2O (01/29/19 0140)  Plateau Pressure: 0 cmH20 (01/29/19 0140)  Total Ve: 0 mL (01/29/19 0140)  F/VT Ratio<105 (RSBI): (!) 26.57 (01/28/19 1050)    Lines/Drains/Airways     Central Venous Catheter Line                 Port A Cath Single Lumen 02/17/19 0930 right subclavian 4 days          Drain                 Gastrostomy/Enterostomy 02/11/19 LUQ 10 days         Urethral Catheter 02/17/19 1507 Straight-tip 16 Fr. 3 days         Rectal Tube 02/20/19 1600 rectal tube w/ balloon (indicate number of mLs) less than 1 day          Arterial Line                 Arterial Line 02/20/19 2030 Right Radial less than 1 day                Significant Labs:    CBC/Anemia Profile:  Recent Labs   Lab 02/20/19  0300 02/20/19  1920 02/21/19  0315   WBC 17.59* 17.40* 17.44*   HGB 8.1* 7.4* 7.2*   HCT 27.5* 25.5* 24.0*    161 172   MCV 95 96 93   RDW 17.9* 18.0* 17.8*        Chemistries:  Recent Labs   Lab 02/19/19  1441 02/20/19  0300 02/20/19  1920  02/21/19  0315   * 146* 143 141   K 4.0 4.0 4.9 4.7    104 103 103   CO2 36* 35* 30* 34*   BUN 8 8 9 10   CREATININE 0.8 0.8 0.9 0.9   CALCIUM 10.0 10.4 10.3 9.9   ALBUMIN 1.4* 1.4*  --  1.7*   PROT 5.5* 5.6*  --  5.4*   BILITOT 4.1* 4.8*  --  5.4*   ALKPHOS 1,648* 1,747*  --  1,645*   ALT 23 23  --  19   AST 93* 103*  --  93*   MG 2.0 1.7 1.8 2.3   PHOS 2.3* 2.4* 3.1 3.1         Significant Imaging:  I have reviewed and interpreted all pertinent imaging results/findings within the past 24 hours.    Assessment/Plan:     * Somnolence    63 y.o. female w/ a significant PMHx of COPD, CAD, T2DM, CKD, HTN, CVA w/ residual right sided deficits, significant debility, and recent exploratory laparotomy for pelvis mass/abscess that required an ICU admission postoperatively. Patient stepped back up to the SICU 02/19/19 s/p rapid response for somnolence and altered mental status.    Neuro:   Alert and responds to name. Not oriented to place or time. Ammonia improved to 50 from 58. Last CT head negative. No focal neurological deficits.   - Continue lactulose BID  - Trend ammonia  - PRN pain medication IV    Pulmonary:   Patient w/ a Hx of hypercapnic respiratory failure on this admission requiring intubation. BiPAP most of the night. On NC now and doing well. ABG wnl. Questionable RUL opacification concerning for developing PNA on CT  - Low threshold to intubate  - CXR PRN  - Empiric abx for possible developing pneumonia    Cardiac:   Currently HDS, not requiring any pressors.   - Continuing home meds    Renal:    Questionable Hx of CKD with urine output tenuous at 20cc/hour. U/A dirty with yeast in culture. Blood cultures have been negative.   - Start antifungals for urine culture showing yeast  - Monitor urine output with strict I/O   - Currently on Lasix 40 mg PO BID, will hold for now  - Trend BUN/Cr    ID:   Afebrile over last 24H with stable leukocytosis to 17. Lactate cleared with  1.5L LR and 500 albumin. On  vanc and zosyn and adding anti-fungal for urine culture showing yeast. Questionable RUL opacification in lungs.   - Continue vanc and zosyn  - Added anti fungal converage  - Trend WBC w/ daily CBCs    Hem/Onc:   H/H 7.2/24. No evidence of active bleeding.   -Trend CBC  - Transfuse for Hgb < 7.0 g/dL    Endocrine:    Hx of T2DM. Endocrine consulted, appreciate assistance. Elevated TSH with low T4  - Accuchecks Q6H as patient NPO  - Low dose SSI Q6H    Fluids/Electrolytes/Nutrition/GI:   Patient with significantly elevated Alk phos and GGT with US that shows thickened wall. Concern for possible acalculous cholecystitis although etiology remains unclear.   - Consider cholecystostomy tube, defer to primary team  - Replace electrolytes PRN  - TF at 10 with goal to 45    PPx:  - DVT: heparin 5000u subq Q8H  - Bowel: lactulose for 3-4 BMs/day  - PUP: Protonix 40 mg daily  - VAP: N/A    DISPO:  - Continue SICU care  - Somnolence w/ multifactorial etiology likely  - Lactulose for elevated ammonia, titrate to effect/BMs  - BiPAP for hypercapnia  - Trend lactic acid Q4H  - F/U blood cultures, respiratory cultures, urine cultures        Critical care was time spent personally by me on the following activities: development of treatment plan with patient or surrogate and bedside caregivers, discussions with consultants, evaluation of patient's response to treatment, examination of patient, ordering and performing treatments and interventions, ordering and review of laboratory studies, ordering and review of radiographic studies, pulse oximetry, re-evaluation of patient's condition.  This critical care time did not overlap with that of any other provider or involve time for any procedures.     Froylan Colmenares MD  Critical Care - Surgery  [facility]

## 2019-02-21 NOTE — PROGRESS NOTES
"[facility]  Endocrinology  Progress Note    Admit Date: 1/23/2019     Reason for Consult: Management of T2DM, Hyperglycemia     Surgical Procedure and Date:      LAPAROTOMY, EXPLORATORY (N/A)     INCISION AND DRAINAGE, ABSCESS-  drainage of pelvic abscess (N/A)     EXCISION, ABSCESS- drainage abdominal wall abscess (N/A)     APPLICATION, WOUND VAC (N/A)       Diabetes diagnosis year: 20 years ago    Home Diabetes Medications:  Reports being taken off of insulin prior to admission.      Levemir 20 units BID     Humalog 16 units TIDWM with correction     How often checking glucose at home? >4 x day   BG readings on regimen: 110's  Hypoglycemia on the regimen?  Yes - 40's  Missed doses on regimen?  No    Diabetes Complications include:     Hypoglycemia     Complicating diabetes co morbidities:   HLD, CVA, and CAD    Lab Results   Component Value Date    HGBA1C 4.7 01/24/2019       HPI:   Patient is a 63 y.o. female with a diagnosis of sepsis and lactic acidosis secondary to abdominal abscess. Also has diagnosis of COPD, asthma, CAD, CVA (2012), and DM2. Patient receives primary care for DM in [place]. Her primary care giver is her daughter. Patient's DM is well controlled by primary Endocrinologist in [place]. Patient has not been on insulin regimen for the past 3 weeks leading up to hospitalization. According to daughter, patient was not eating, and having low BG reading. Therefore, insulin regimen was stopped by patient's primary Endocrinologist. Endocrinology at [facility] consulted to manage DM/hyperglycemia.             Interval HPI:   Overnight events: No acute overnight events. Patient more arousal today although still not following commands. Abdominal US demonstrated diffuse gallbladder wall thickening   Eating:   NPO  Nausea: No  Hypoglycemia and intervention: No  Fever: No  TPN and/or TF: No    /63   Pulse 62   Temp 98.6 °F (37 °C) (Oral)   Resp (!) 23   Ht 5' 1" (1.549 m)   Wt 83 kg " (183 lb)   LMP  (LMP Unknown)   SpO2 97%   Breastfeeding? No   BMI 34.58 kg/m²      Labs Reviewed and Include    Recent Labs   Lab 02/21/19  0315   *   CALCIUM 9.9   ALBUMIN 1.7*   PROT 5.4*      K 4.7   CO2 34*      BUN 10   CREATININE 0.9   ALKPHOS 1,645*   ALT 19   AST 93*   BILITOT 5.4*     Lab Results   Component Value Date    WBC 17.44 (H) 02/21/2019    HGB 7.2 (L) 02/21/2019    HCT 24.0 (L) 02/21/2019    MCV 93 02/21/2019     02/21/2019     Recent Labs   Lab 02/19/19  1441   TSH 4.718*   FREET4 0.70*     Lab Results   Component Value Date    HGBA1C 4.7 01/24/2019       Nutritional status:   Body mass index is 34.58 kg/m².  Lab Results   Component Value Date    ALBUMIN 1.7 (L) 02/21/2019    ALBUMIN 1.4 (L) 02/20/2019    ALBUMIN 1.4 (L) 02/19/2019     Lab Results   Component Value Date    PREALBUMIN 3 (L) 02/21/2019    PREALBUMIN 4 (L) 02/18/2019    PREALBUMIN 5 (L) 02/14/2019       Estimated Creatinine Clearance: 62.5 mL/min (based on SCr of 0.9 mg/dL).    Accu-Checks  Recent Labs     02/19/19  2302 02/20/19  0259 02/20/19  1152 02/20/19  1638 02/20/19  2034 02/20/19  2304 02/20/19  2312 02/21/19  0433 02/21/19  0934 02/21/19  1104   POCTGLUCOSE 185* 130* 156* 195* 200* 330* 233* 262* 236* 236*       Current Medications and/or Treatments Impacting Glycemic Control  Immunotherapy:    Immunosuppressants     None        Steroids:   Hormones (From admission, onward)    Start     Stop Route Frequency Ordered    02/20/19 1115  hydrocortisone sodium succinate injection 100 mg      02/25 1359 IV Every 8 hours 02/20/19 1102        Pressors:    Autonomic Drugs (From admission, onward)    None        Hyperglycemia/Diabetes Medications:   Antihyperglycemics (From admission, onward)    Start     Stop Route Frequency Ordered    02/21/19 1015  insulin regular (Humulin R) 100 Units in sodium chloride 0.9% 100 mL infusion     Question:  Insulin Rate Adjustment (DO NOT MODIFY ANSWER)  Answer:   [email or web address]    -- IV Continuous 02/21/19 0915    02/17/19 0902  insulin aspart U-100 pen 0-5 Units      -- SubQ Every 4 hours PRN 02/17/19 0803          ASSESSMENT and PLAN    * Somnolence    Cortisol 4.2, albumin 1.4 (low albumin underestimates cortisol level & therefore makes results inaccurate)  Adrenal insufficiency unlikely given patient's clinical signs/symptoms and clinical picture  Would stop steroids especially in the setting of infection     Abnormal thyroid function test    TSH 4.718; 5 months ago was 1.563  Free T4 minimally low at .7 (nl range .71-1.51)  Likely 2/2 non-thyroidal illness (in the setting of severe illness) & not the cause of patient's current illness  Would not assess thyroid function in severe illness  Would repeat TFTs in 1-2 weeks once patient clinically improves     On enteral nutrition    Tube feeds currently being held  On D5+ 1/2NS  May elevate BG  Monitor accuchecks       Type 2 diabetes mellitus    BG goal 140 - 180   Currently NPO, on D5 +1/2 NS @75cc/hr  Will switch to intensive insulin gtt as BG & on steroids         Anastacio Brunson MD  Endocrinology  [facility]

## 2019-02-21 NOTE — PT/OT/SLP EVAL
Speech Language Pathology Evaluation  Bedside Swallow    Patient Name:  Sheryl Martines   MRN:  [id number]  Admitting Diagnosis: Somnolence    Recommendations:                 General Recommendations:  Dysphagia therapy  Diet recommendations:  NPO, NPO   Aspiration Precautions: Strict aspiration precautions   General Precautions: Standard, fall, aspiration, NPO  Communication strategies:  none    History:     Past Medical History:   Diagnosis Date    Abnormal LFTs 12/14/2018    Asthma     Closed compression fracture of fourth lumbar vertebra     COPD (chronic obstructive pulmonary disease)     Coronary artery disease     Diabetes mellitus     Fall 10/4/2018    Fracture     right tib & fib    Glaucoma     High cholesterol     Hypertension     Infected incision 9/20/2018    Intractable nausea and vomiting 1/23/2019    Iritis     Pulmonary embolus     S/P appendectomy 9/11/2018    Stroke     rt sided weakness.       Past Surgical History:   Procedure Laterality Date    ABDOMINAL SURGERY      APPENDECTOMY N/A 8/26/2018    Performed [facility]rez [facility] at [facility] OR    APPENDECTOMY, LAPAROSCOPIC---CONVERTED TO OPEN APPENDECTOMY @0950 N/A 8/26/2018    Per[facility] at [facility] OR    APPLICATION, WOUND VAC N/A 1/25/2019    Performed by Monster Laurent MD at [facility] OR [address] [place] [address]    BACK SURGERY      stimulator    CATARACT EXTRACTION      EXCISION, ABSCESS- drainage abdominal wall abscess N/A 1/25/2019    Performed by Monster Laurent MD at [facility] OR [address] [place] [address]    HYSTERECTOMY      INCISION AND DRAINAGE, ABSCESS-  drainage of pelvic abscess N/A 1/25/2019    Performed by Monster Laurent MD at [facility] OR [address] [place] [address]    LAPAROTOMY, EXPLORATORY N/A 1/25/2019    Performed by Monster Laurent MD at [facility] OR [address] [place] [address]    TOTAL HIP ARTHROPLASTY Left     2008     Prior diet: NPO per recent ST recs prior to ICU admit.     Subjective     Pt found in bed with family member at the bedside.  Recently removed from BiPAP    Pain/Comfort:  · Pain Rating 1: 0/10    Objective:     Oral Musculature Evaluation  · Oral Musculature: unable to assess due to poor participation/comprehension  · Dentition: present and adequate  · Secretion Management: adequate  · Mucosal Quality: dry  · Mandibular Strength and Mobility: impaired(reduced strength )  · Oral Labial Strength and Mobility: functional pursing, functional retraction  · Lingual Strength and Mobility: impaired strength  · Volitional Cough: elicited, productive   · Volitional Swallow: elicited, inconsistent timing of initiation   · Voice Prior to PO Intake: clear, adequate intensity, mildly dysarthric   · Oral Musculature Comments: patient with palatine kanu, see ENT consult for furhter details    Bedside Swallow Eval:   Upon ST entry to room, patient found moaning in pain, poor command following, and confusion per daughter. patient is not appropriate for PO trials given current state and previously swallow status prior to ICU admit, and is not appropriate for dysphagia exercises this date. ST provided skilled education re ongoing NPO status, dysphagia exercises, and ongoing ST POC. patient with no evidence of learning, daughter reported understanding. All the above results and recommendations were discussed with NSG.   Whiteboard updated.    Assessment:     Sheryl Martines is a 63 y.o. female with an SLP diagnosis of Dysphagia.      Goals:   Multidisciplinary Problems     SLP Goals        Problem: SLP Goal    Goal Priority Disciplines Outcome   SLP Goal     SLP Ongoing (interventions implemented as appropriate)   Description:  Speech Language Pathology Goals  Goals expected to be met by 2/28/19  1. Pt will participate in further, instrumental assessment via MBSS   2. Educate Pt and family on S/S aspiration and aspiration precautions   3. Pt will complete dysphagia exercises x10 ea with good effort 90% of attempts to improve BOT coordination and pharyngeal  strength/coordination.                                 Plan:     · Patient to be seen:  3 x/week   · Plan of Care expires:  03/21/19  · Plan of Care reviewed with:  patient   · SLP Follow-Up:  Yes       Discharge recommendations:  nursing facility, skilled   Barriers to Discharge:  None    Time Tracking:     SLP Treatment Date:   02/21/19  Speech Start Time:  0909  Speech Stop Time:  0917     Speech Total Time (min):  8 min    Billable Minutes: Eval Swallow and Oral Function 8    Emily Abadie, CCC-SLP  02/21/2019

## 2019-02-21 NOTE — ASSESSMENT & PLAN NOTE
63 y.o. female w/ a significant PMHx of COPD, CAD, T2DM, CKD, HTN, CVA w/ residual right sided deficits, significant debility, and recent exploratory laparotomy for pelvis mass/abscess that required an ICU admission postoperatively. Patient stepped back up to the SICU 02/19/19 s/p rapid response for somnolence and altered mental status.    Neuro:   Alert and responds to name. Not oriented to place or time. Ammonia improved to 50 from 58. Last CT head negative. No focal neurological deficits.   - Continue lactulose BID  - Trend ammonia  - PRN pain medication IV    Pulmonary:   Patient w/ a Hx of hypercapnic respiratory failure on this admission requiring intubation. BiPAP most of the night. On NC now and doing well. ABG wnl. Questionable RUL opacification concerning for developing PNA on CT  - Low threshold to intubate  - CXR PRN  - Empiric abx for possible developing pneumonia    Cardiac:   Currently HDS, not requiring any pressors.   - Continuing home meds    Renal:    Questionable Hx of CKD with urine output tenuous at 20cc/hour. U/A dirty with yeast in culture. Blood cultures have been negative.   - Start antifungals for urine culture showing yeast  - Monitor urine output with strict I/O   - Currently on Lasix 40 mg PO BID, will hold for now  - Trend BUN/Cr    ID:   Afebrile over last 24H with stable leukocytosis to 17. Lactate cleared with  1.5L LR and 500 albumin. On vanc and zosyn and adding anti-fungal for urine culture showing yeast. Questionable RUL opacification in lungs.   - Continue vanc and zosyn  - Added anti fungal converage  - Trend WBC w/ daily CBCs    Hem/Onc:   H/H 7.2/24. No evidence of active bleeding.   -Trend CBC  - Transfuse for Hgb < 7.0 g/dL    Endocrine:    Hx of T2DM. Endocrine consulted, appreciate assistance. Elevated TSH with low T4  - Accuchecks Q6H as patient NPO  - Low dose SSI Q6H    Fluids/Electrolytes/Nutrition/GI:   Patient with significantly elevated Alk phos and GGT with US  that shows thickened wall. Concern for possible acalculous cholecystitis although etiology remains unclear.   - Consider cholecystostomy tube, defer to primary team  - Replace electrolytes PRN  - TF at 10 with goal to 45    PPx:  - DVT: heparin 5000u subq Q8H  - Bowel: lactulose for 3-4 BMs/day  - PUP: Protonix 40 mg daily  - VAP: N/A    DISPO:  - Continue SICU care  - Somnolence w/ multifactorial etiology likely  - Lactulose for elevated ammonia, titrate to effect/BMs  - BiPAP for hypercapnia  - Trend lactic acid Q4H  - F/U blood cultures, respiratory cultures, urine cultures

## 2019-02-21 NOTE — ASSESSMENT & PLAN NOTE
62 yo female presenting with abdominal pain, nausea, elevated lactate s/p open drainage pelvic abscess 1/25     -NPO  -Tube feeds have been held  -Continue broad spec abx - PO cipro, flagyl - estimated end date 2/26/19  -WOCN following, appreciate help  -DVT/GI prophylaxis  -PT/OT - SNF

## 2019-02-22 PROBLEM — R00.1 BRADYCARDIA: Status: ACTIVE | Noted: 2019-02-22

## 2019-02-22 LAB
ABO + RH BLD: NORMAL
ALBUMIN SERPL BCP-MCNC: 2.4 G/DL
ALBUMIN SERPL BCP-MCNC: 2.5 G/DL
ALLENS TEST: ABNORMAL
ALP SERPL-CCNC: 1567 U/L
ALP SERPL-CCNC: 1728 U/L
ALT SERPL W/O P-5'-P-CCNC: 22 U/L
ALT SERPL W/O P-5'-P-CCNC: 27 U/L
AMMONIA PLAS-SCNC: 49 UMOL/L
AMMONIA PLAS-SCNC: 51 UMOL/L
ANION GAP SERPL CALC-SCNC: 7 MMOL/L
ANION GAP SERPL CALC-SCNC: 7 MMOL/L
ANION GAP SERPL CALC-SCNC: 8 MMOL/L
ANISOCYTOSIS BLD QL SMEAR: SLIGHT
ANISOCYTOSIS BLD QL SMEAR: SLIGHT
ASCENDING AORTA: 2.93 CM
AST SERPL-CCNC: 145 U/L
AST SERPL-CCNC: 200 U/L
AV INDEX (PROSTH): 0.9
AV MEAN GRADIENT: 2.74 MMHG
AV PEAK GRADIENT: 4.84 MMHG
AV VALVE AREA: 2.82 CM2
AV VELOCITY RATIO: 0.73
BASO STIPL BLD QL SMEAR: ABNORMAL
BASOPHILS # BLD AUTO: 0.01 K/UL
BASOPHILS # BLD AUTO: 0.04 K/UL
BASOPHILS NFR BLD: 0.1 %
BASOPHILS NFR BLD: 0.2 %
BILIRUB SERPL-MCNC: 4.8 MG/DL
BILIRUB SERPL-MCNC: 5.2 MG/DL
BLD GP AB SCN CELLS X3 SERPL QL: NORMAL
BLD PROD TYP BPU: NORMAL
BLD PROD TYP BPU: NORMAL
BLOOD UNIT EXPIRATION DATE: NORMAL
BLOOD UNIT EXPIRATION DATE: NORMAL
BLOOD UNIT TYPE CODE: 6200
BLOOD UNIT TYPE CODE: 6200
BLOOD UNIT TYPE: NORMAL
BLOOD UNIT TYPE: NORMAL
BSA FOR ECHO PROCEDURE: 1.95 M2
BUN SERPL-MCNC: 16 MG/DL
BUN SERPL-MCNC: 18 MG/DL
BUN SERPL-MCNC: 18 MG/DL
CA-I BLDV-SCNC: 1.14 MMOL/L
CALCIUM SERPL-MCNC: 9.1 MG/DL
CALCIUM SERPL-MCNC: 9.5 MG/DL
CALCIUM SERPL-MCNC: 9.8 MG/DL
CHLORIDE SERPL-SCNC: 103 MMOL/L
CHLORIDE SERPL-SCNC: 103 MMOL/L
CHLORIDE SERPL-SCNC: 105 MMOL/L
CO2 SERPL-SCNC: 27 MMOL/L
CO2 SERPL-SCNC: 28 MMOL/L
CO2 SERPL-SCNC: 28 MMOL/L
CODING SYSTEM: NORMAL
CODING SYSTEM: NORMAL
CREAT SERPL-MCNC: 1 MG/DL
CREAT SERPL-MCNC: 1.2 MG/DL
CREAT SERPL-MCNC: 1.2 MG/DL
CV ECHO LV RWT: 0.35 CM
DELSYS: ABNORMAL
DIFFERENTIAL METHOD: ABNORMAL
DIFFERENTIAL METHOD: ABNORMAL
DISPENSE STATUS: NORMAL
DISPENSE STATUS: NORMAL
DOP CALC AO PEAK VEL: 1.1 M/S
DOP CALC AO VTI: 22.94 CM
DOP CALC LVOT AREA: 3.14 CM2
DOP CALC LVOT DIAMETER: 2 CM
DOP CALC LVOT PEAK VEL: 0.8 M/S
DOP CALC LVOT STROKE VOLUME: 64.62 CM3
DOP CALCLVOT PEAK VEL VTI: 20.58 CM
E WAVE DECELERATION TIME: 221.51 MSEC
E/A RATIO: 1.73
E/E' RATIO: 18
ECHO LV POSTERIOR WALL: 0.8 CM (ref 0.6–1.1)
EOSINOPHIL # BLD AUTO: 0 K/UL
EOSINOPHIL # BLD AUTO: 0 K/UL
EOSINOPHIL NFR BLD: 0 %
EOSINOPHIL NFR BLD: 0 %
EP: 5
ERYTHROCYTE [DISTWIDTH] IN BLOOD BY AUTOMATED COUNT: 16.3 %
ERYTHROCYTE [DISTWIDTH] IN BLOOD BY AUTOMATED COUNT: 18.3 %
ERYTHROCYTE [SEDIMENTATION RATE] IN BLOOD BY WESTERGREN METHOD: 20 MM/H
EST. GFR  (AFRICAN AMERICAN): 55.6 ML/MIN/1.73 M^2
EST. GFR  (AFRICAN AMERICAN): 55.6 ML/MIN/1.73 M^2
EST. GFR  (AFRICAN AMERICAN): >60 ML/MIN/1.73 M^2
EST. GFR  (NON AFRICAN AMERICAN): 48.2 ML/MIN/1.73 M^2
EST. GFR  (NON AFRICAN AMERICAN): 48.2 ML/MIN/1.73 M^2
EST. GFR  (NON AFRICAN AMERICAN): >60 ML/MIN/1.73 M^2
FIO2: 50
FRACTIONAL SHORTENING: 20 % (ref 28–44)
GIANT PLATELETS BLD QL SMEAR: PRESENT
GLUCOSE SERPL-MCNC: 111 MG/DL
GLUCOSE SERPL-MCNC: 140 MG/DL
GLUCOSE SERPL-MCNC: 145 MG/DL
HCO3 UR-SCNC: 27.8 MMOL/L (ref 24–28)
HCT VFR BLD AUTO: 20.7 %
HCT VFR BLD AUTO: 32.7 %
HEINZ BOD BLD QL SMEAR: PRESENT
HGB BLD-MCNC: 10.6 G/DL
HGB BLD-MCNC: 6.2 G/DL
HYPOCHROMIA BLD QL SMEAR: ABNORMAL
HYPOCHROMIA BLD QL SMEAR: ABNORMAL
IMM GRANULOCYTES # BLD AUTO: 0.11 K/UL
IMM GRANULOCYTES # BLD AUTO: 0.24 K/UL
IMM GRANULOCYTES NFR BLD AUTO: 0.7 %
IMM GRANULOCYTES NFR BLD AUTO: 1.1 %
INTERVENTRICULAR SEPTUM: 0.7 CM (ref 0.6–1.1)
IP: 12
LA MAJOR: 5.68 CM
LA MINOR: 5.57 CM
LA WIDTH: 4.03 CM
LACTATE SERPL-SCNC: 1.4 MMOL/L
LACTATE SERPL-SCNC: 1.6 MMOL/L
LACTATE SERPL-SCNC: 1.9 MMOL/L
LACTATE SERPL-SCNC: 2.4 MMOL/L
LEFT ATRIUM SIZE: 4.68 CM
LEFT ATRIUM VOLUME INDEX: 48 ML/M2
LEFT ATRIUM VOLUME: 90.17 CM3
LEFT INTERNAL DIMENSION IN SYSTOLE: 3.7 CM (ref 2.1–4)
LEFT VENTRICLE DIASTOLIC VOLUME INDEX: 52.99 ML/M2
LEFT VENTRICLE DIASTOLIC VOLUME: 99.54 ML
LEFT VENTRICLE MASS INDEX: 57.7 G/M2
LEFT VENTRICLE SYSTOLIC VOLUME INDEX: 36.1 ML/M2
LEFT VENTRICLE SYSTOLIC VOLUME: 67.85 ML
LEFT VENTRICULAR INTERNAL DIMENSION IN DIASTOLE: 4.6 CM (ref 3.5–6)
LEFT VENTRICULAR MASS: 108.46 G
LKM AB SER-ACNC: 1.9 UNITS
LV LATERAL E/E' RATIO: 15
LV SEPTAL E/E' RATIO: 22.5
LYMPHOCYTES # BLD AUTO: 1.6 K/UL
LYMPHOCYTES # BLD AUTO: 1.9 K/UL
LYMPHOCYTES NFR BLD: 12.3 %
LYMPHOCYTES NFR BLD: 7.6 %
MAGNESIUM SERPL-MCNC: 2.8 MG/DL
MAGNESIUM SERPL-MCNC: 2.9 MG/DL
MCH RBC QN AUTO: 28.3 PG
MCH RBC QN AUTO: 29.9 PG
MCHC RBC AUTO-ENTMCNC: 30 G/DL
MCHC RBC AUTO-ENTMCNC: 32.4 G/DL
MCV RBC AUTO: 92 FL
MCV RBC AUTO: 95 FL
MODE: ABNORMAL
MONOCYTES # BLD AUTO: 0.8 K/UL
MONOCYTES # BLD AUTO: 0.8 K/UL
MONOCYTES NFR BLD: 3.8 %
MONOCYTES NFR BLD: 5.2 %
MV PEAK A VEL: 0.78 M/S
MV PEAK E VEL: 1.35 M/S
NEUTROPHILS # BLD AUTO: 12.5 K/UL
NEUTROPHILS # BLD AUTO: 18.7 K/UL
NEUTROPHILS NFR BLD: 81.7 %
NEUTROPHILS NFR BLD: 87.3 %
NRBC BLD-RTO: 1 /100 WBC
NRBC BLD-RTO: 1 /100 WBC
OVALOCYTES BLD QL SMEAR: ABNORMAL
OVALOCYTES BLD QL SMEAR: ABNORMAL
PCO2 BLDA: 48.6 MMHG (ref 35–45)
PH SMN: 7.37 [PH] (ref 7.35–7.45)
PHOSPHATE SERPL-MCNC: 5 MG/DL
PHOSPHATE SERPL-MCNC: 5.3 MG/DL
PISA TR MAX VEL: 3.33 M/S
PLATELET # BLD AUTO: 155 K/UL
PLATELET # BLD AUTO: 156 K/UL
PLATELET BLD QL SMEAR: ABNORMAL
PMV BLD AUTO: 12.9 FL
PMV BLD AUTO: 13 FL
PO2 BLDA: 157 MMHG (ref 80–100)
POC BE: 2 MMOL/L
POC SATURATED O2: 99 % (ref 95–100)
POC TCO2: 29 MMOL/L (ref 23–27)
POCT GLUCOSE: 113 MG/DL (ref 70–110)
POCT GLUCOSE: 115 MG/DL (ref 70–110)
POCT GLUCOSE: 135 MG/DL (ref 70–110)
POCT GLUCOSE: 139 MG/DL (ref 70–110)
POCT GLUCOSE: 149 MG/DL (ref 70–110)
POCT GLUCOSE: 153 MG/DL (ref 70–110)
POCT GLUCOSE: 156 MG/DL (ref 70–110)
POCT GLUCOSE: 163 MG/DL (ref 70–110)
POCT GLUCOSE: 166 MG/DL (ref 70–110)
POCT GLUCOSE: 170 MG/DL (ref 70–110)
POCT GLUCOSE: 174 MG/DL (ref 70–110)
POIKILOCYTOSIS BLD QL SMEAR: SLIGHT
POIKILOCYTOSIS BLD QL SMEAR: SLIGHT
POLYCHROMASIA BLD QL SMEAR: ABNORMAL
POLYCHROMASIA BLD QL SMEAR: ABNORMAL
POTASSIUM SERPL-SCNC: 5.6 MMOL/L
POTASSIUM SERPL-SCNC: 5.9 MMOL/L
POTASSIUM SERPL-SCNC: 5.9 MMOL/L
PROT SERPL-MCNC: 5.5 G/DL
PROT SERPL-MCNC: 5.8 G/DL
RA MAJOR: 4.73 CM
RA PRESSURE: 15 MMHG
RA WIDTH: 3.52 CM
RBC # BLD AUTO: 2.19 M/UL
RBC # BLD AUTO: 3.54 M/UL
RIGHT VENTRICULAR END-DIASTOLIC DIMENSION: 4.03 CM
SAMPLE: ABNORMAL
SINUS: 2.91 CM
SITE: ABNORMAL
SODIUM SERPL-SCNC: 138 MMOL/L
SODIUM SERPL-SCNC: 139 MMOL/L
SODIUM SERPL-SCNC: 139 MMOL/L
SP02: 100
STJ: 2.98 CM
TDI LATERAL: 0.09
TDI SEPTAL: 0.06
TDI: 0.08
TR MAX PG: 44.36 MMHG
TRANS ERYTHROCYTES VOL PATIENT: NORMAL ML
TRANS ERYTHROCYTES VOL PATIENT: NORMAL ML
TRICUSPID ANNULAR PLANE SYSTOLIC EXCURSION: 2.29 CM
TV REST PULMONARY ARTERY PRESSURE: 59 MMHG
WBC # BLD AUTO: 15.24 K/UL
WBC # BLD AUTO: 21.42 K/UL

## 2019-02-22 PROCEDURE — 63600175 PHARM REV CODE 636 W HCPCS: Performed by: STUDENT IN AN ORGANIZED HEALTH CARE EDUCATION/TRAINING PROGRAM

## 2019-02-22 PROCEDURE — 25000242 PHARM REV CODE 250 ALT 637 W/ HCPCS: Performed by: STUDENT IN AN ORGANIZED HEALTH CARE EDUCATION/TRAINING PROGRAM

## 2019-02-22 PROCEDURE — 80053 COMPREHEN METABOLIC PANEL: CPT

## 2019-02-22 PROCEDURE — 25000003 PHARM REV CODE 250: Performed by: STUDENT IN AN ORGANIZED HEALTH CARE EDUCATION/TRAINING PROGRAM

## 2019-02-22 PROCEDURE — 63600175 PHARM REV CODE 636 W HCPCS: Performed by: SURGERY

## 2019-02-22 PROCEDURE — 83735 ASSAY OF MAGNESIUM: CPT

## 2019-02-22 PROCEDURE — 93010 ELECTROCARDIOGRAM REPORT: CPT | Mod: ,,, | Performed by: INTERNAL MEDICINE

## 2019-02-22 PROCEDURE — 82140 ASSAY OF AMMONIA: CPT

## 2019-02-22 PROCEDURE — 93010 EKG 12-LEAD: ICD-10-PCS | Mod: ,,, | Performed by: INTERNAL MEDICINE

## 2019-02-22 PROCEDURE — 99291 PR CRITICAL CARE, E/M 30-74 MINUTES: ICD-10-PCS | Mod: 24,,, | Performed by: SURGERY

## 2019-02-22 PROCEDURE — 25000003 PHARM REV CODE 250: Performed by: SURGERY

## 2019-02-22 PROCEDURE — 94640 AIRWAY INHALATION TREATMENT: CPT

## 2019-02-22 PROCEDURE — S5010 5% DEXTROSE AND 0.45% SALINE: HCPCS | Performed by: STUDENT IN AN ORGANIZED HEALTH CARE EDUCATION/TRAINING PROGRAM

## 2019-02-22 PROCEDURE — 93005 ELECTROCARDIOGRAM TRACING: CPT

## 2019-02-22 PROCEDURE — 86850 RBC ANTIBODY SCREEN: CPT

## 2019-02-22 PROCEDURE — 85025 COMPLETE CBC W/AUTO DIFF WBC: CPT | Mod: 91

## 2019-02-22 PROCEDURE — 83605 ASSAY OF LACTIC ACID: CPT

## 2019-02-22 PROCEDURE — 20000000 HC ICU ROOM

## 2019-02-22 PROCEDURE — 82803 BLOOD GASES ANY COMBINATION: CPT

## 2019-02-22 PROCEDURE — 94660 CPAP INITIATION&MGMT: CPT

## 2019-02-22 PROCEDURE — 99291 CRITICAL CARE FIRST HOUR: CPT | Mod: 24,,, | Performed by: SURGERY

## 2019-02-22 PROCEDURE — 27000221 HC OXYGEN, UP TO 24 HOURS

## 2019-02-22 PROCEDURE — 84100 ASSAY OF PHOSPHORUS: CPT | Mod: 91

## 2019-02-22 PROCEDURE — 94761 N-INVAS EAR/PLS OXIMETRY MLT: CPT

## 2019-02-22 PROCEDURE — P9021 RED BLOOD CELLS UNIT: HCPCS

## 2019-02-22 PROCEDURE — 83735 ASSAY OF MAGNESIUM: CPT | Mod: 91

## 2019-02-22 PROCEDURE — C9113 INJ PANTOPRAZOLE SODIUM, VIA: HCPCS | Performed by: STUDENT IN AN ORGANIZED HEALTH CARE EDUCATION/TRAINING PROGRAM

## 2019-02-22 PROCEDURE — 99900035 HC TECH TIME PER 15 MIN (STAT)

## 2019-02-22 PROCEDURE — 80053 COMPREHEN METABOLIC PANEL: CPT | Mod: 91

## 2019-02-22 PROCEDURE — 37799 UNLISTED PX VASCULAR SURGERY: CPT

## 2019-02-22 PROCEDURE — 84100 ASSAY OF PHOSPHORUS: CPT

## 2019-02-22 PROCEDURE — 82330 ASSAY OF CALCIUM: CPT

## 2019-02-22 PROCEDURE — 80048 BASIC METABOLIC PNL TOTAL CA: CPT

## 2019-02-22 PROCEDURE — 86920 COMPATIBILITY TEST SPIN: CPT

## 2019-02-22 RX ORDER — GLUCAGON 1 MG
1 KIT INJECTION
Status: DISCONTINUED | OUTPATIENT
Start: 2019-02-22 | End: 2019-02-25

## 2019-02-22 RX ORDER — ACETAMINOPHEN 650 MG/20.3ML
650 LIQUID ORAL ONCE
Status: COMPLETED | OUTPATIENT
Start: 2019-02-22 | End: 2019-02-22

## 2019-02-22 RX ORDER — DEXTROSE MONOHYDRATE AND SODIUM CHLORIDE 5; .45 G/100ML; G/100ML
INJECTION, SOLUTION INTRAVENOUS CONTINUOUS
Status: DISCONTINUED | OUTPATIENT
Start: 2019-02-22 | End: 2019-02-23

## 2019-02-22 RX ORDER — MORPHINE SULFATE 2 MG/ML
3.5 INJECTION, SOLUTION INTRAMUSCULAR; INTRAVENOUS ONCE
Status: COMPLETED | OUTPATIENT
Start: 2019-02-22 | End: 2019-02-22

## 2019-02-22 RX ORDER — INSULIN ASPART 100 [IU]/ML
0-5 INJECTION, SOLUTION INTRAVENOUS; SUBCUTANEOUS EVERY 4 HOURS PRN
Status: DISCONTINUED | OUTPATIENT
Start: 2019-02-22 | End: 2019-02-25

## 2019-02-22 RX ORDER — FUROSEMIDE 10 MG/ML
20 INJECTION INTRAMUSCULAR; INTRAVENOUS ONCE
Status: DISCONTINUED | OUTPATIENT
Start: 2019-02-22 | End: 2019-02-22

## 2019-02-22 RX ORDER — HYDROCODONE BITARTRATE AND ACETAMINOPHEN 500; 5 MG/1; MG/1
TABLET ORAL
Status: DISCONTINUED | OUTPATIENT
Start: 2019-02-22 | End: 2019-02-25

## 2019-02-22 RX ORDER — ACETAMINOPHEN 650 MG/20.3ML
650 LIQUID ORAL ONCE
Status: DISCONTINUED | OUTPATIENT
Start: 2019-02-22 | End: 2019-02-22

## 2019-02-22 RX ORDER — ATROPINE SULFATE 0.1 MG/ML
INJECTION INTRAVENOUS
Status: DISPENSED
Start: 2019-02-22 | End: 2019-02-22

## 2019-02-22 RX ADMIN — FLUCONAZOLE, SODIUM CHLORIDE 400 MG: 2 INJECTION INTRAVENOUS at 10:02

## 2019-02-22 RX ADMIN — STANDARDIZED SENNA CONCENTRATE AND DOCUSATE SODIUM 1 TABLET: 8.6; 5 TABLET, FILM COATED ORAL at 09:02

## 2019-02-22 RX ADMIN — LACTULOSE 20 G: 20 SOLUTION ORAL at 09:02

## 2019-02-22 RX ADMIN — IPRATROPIUM BROMIDE AND ALBUTEROL SULFATE 3 ML: .5; 3 SOLUTION RESPIRATORY (INHALATION) at 07:02

## 2019-02-22 RX ADMIN — PIPERACILLIN AND TAZOBACTAM 4.5 G: 4; .5 INJECTION, POWDER, LYOPHILIZED, FOR SOLUTION INTRAVENOUS; PARENTERAL at 10:02

## 2019-02-22 RX ADMIN — ACETAMINOPHEN 650 MG: 650 SOLUTION ORAL at 11:02

## 2019-02-22 RX ADMIN — PANTOPRAZOLE SODIUM 40 MG: 40 INJECTION, POWDER, FOR SOLUTION INTRAVENOUS at 09:02

## 2019-02-22 RX ADMIN — HYDROCORTISONE SODIUM SUCCINATE 100 MG: 100 INJECTION, POWDER, FOR SOLUTION INTRAMUSCULAR; INTRAVENOUS at 05:02

## 2019-02-22 RX ADMIN — LACTULOSE 20 G: 20 SOLUTION ORAL at 10:02

## 2019-02-22 RX ADMIN — IPRATROPIUM BROMIDE AND ALBUTEROL SULFATE 3 ML: .5; 3 SOLUTION RESPIRATORY (INHALATION) at 08:02

## 2019-02-22 RX ADMIN — PIPERACILLIN AND TAZOBACTAM 4.5 G: 4; .5 INJECTION, POWDER, LYOPHILIZED, FOR SOLUTION INTRAVENOUS; PARENTERAL at 07:02

## 2019-02-22 RX ADMIN — ASPIRIN 81 MG CHEWABLE TABLET 81 MG: 81 TABLET CHEWABLE at 09:02

## 2019-02-22 RX ADMIN — IPRATROPIUM BROMIDE AND ALBUTEROL SULFATE 3 ML: .5; 3 SOLUTION RESPIRATORY (INHALATION) at 12:02

## 2019-02-22 RX ADMIN — HEPARIN SODIUM 5000 UNITS: 5000 INJECTION, SOLUTION INTRAVENOUS; SUBCUTANEOUS at 05:02

## 2019-02-22 RX ADMIN — DEXTROSE AND SODIUM CHLORIDE: 5; .45 INJECTION, SOLUTION INTRAVENOUS at 05:02

## 2019-02-22 RX ADMIN — IPRATROPIUM BROMIDE AND ALBUTEROL SULFATE 3 ML: .5; 3 SOLUTION RESPIRATORY (INHALATION) at 03:02

## 2019-02-22 RX ADMIN — HEPARIN SODIUM 5000 UNITS: 5000 INJECTION, SOLUTION INTRAVENOUS; SUBCUTANEOUS at 10:02

## 2019-02-22 RX ADMIN — MICONAZOLE NITRATE: 20 OINTMENT TOPICAL at 10:02

## 2019-02-22 RX ADMIN — HYDROMORPHONE HYDROCHLORIDE 0.5 MG: 1 INJECTION, SOLUTION INTRAMUSCULAR; INTRAVENOUS; SUBCUTANEOUS at 10:02

## 2019-02-22 RX ADMIN — HYDROMORPHONE HYDROCHLORIDE 0.5 MG: 1 INJECTION, SOLUTION INTRAMUSCULAR; INTRAVENOUS; SUBCUTANEOUS at 03:02

## 2019-02-22 RX ADMIN — Medication 3.5 MG: at 03:02

## 2019-02-22 RX ADMIN — PIPERACILLIN AND TAZOBACTAM 4.5 G: 4; .5 INJECTION, POWDER, LYOPHILIZED, FOR SOLUTION INTRAVENOUS; PARENTERAL at 02:02

## 2019-02-22 RX ADMIN — STANDARDIZED SENNA CONCENTRATE AND DOCUSATE SODIUM 1 TABLET: 8.6; 5 TABLET, FILM COATED ORAL at 10:02

## 2019-02-22 RX ADMIN — HYDROMORPHONE HYDROCHLORIDE 0.5 MG: 1 INJECTION, SOLUTION INTRAMUSCULAR; INTRAVENOUS; SUBCUTANEOUS at 06:02

## 2019-02-22 NOTE — ASSESSMENT & PLAN NOTE
TSH 4.718; 5 months ago was 1.563  Free T4 minimally low at .7 (nl range .71-1.51)  Likely 2/2 non-thyroidal illness (in the setting of severe illness) & not the cause of patient's current illness  Would not assess thyroid function in severe illness  Would repeat TFTs in 1-2 weeks once patient more stable

## 2019-02-22 NOTE — PHYSICIAN QUERY
PT Name: Sheryl Martines  MR #: [id number]    Physician Query Form - Cause and Effect Relationship Clarification      CDS/: JUDY Ge,[email or web address]          Contact information:[email or web address]    This form is a permanent document in the medical record.     Query Date: February 22, 2019    By submitting this query, we are merely seeking further clarification of documentation. Please utilize your independent clinical judgment when addressing the question(s) below.    The Medical record contains the following:  Supporting Clinical Findings   Location in record   Acute cystitis                                            UA showing many bacteria and 2+ leukocytes - nieves in place, candida, treating with diflucan                                                                                                       history of HTN, COPD on home oxygen, HFpEF, IDDMII, CVA on plavix, HTN, debility after fall and broken hip, and PE who presents to the ED with a several month history of nausea, vomiting, inability to tolerate a diet and weight loss. She has had multiple admissions in the past few months for different ailments. She presents today with continued nausea, vomiting and abdominal pain that is mostly worse in the RLQ. During the examination, she states her pain was all over her abdomen because she was retching so much. She states that she has lost close to 40lbs since her surgery and that she only able to keep broth down. She had a lap converted to open appendectomy back in August 2018 that was complicated by a wound infection, C diff and failure to thrive. This seems to persists. She is now wheelchair bound (per her) and apparently lives in [place] but is here in [place] with her daughter.     She had a CT scan in the ED which revealed an irregular shaped rim enhancing fluid collection measuring at least 4.0 x 3.0 cm ight hemipelvis at the site of prior appendectomy. Surgery was consulted for  "further recommendations. She was also noted to have a "knot" at the RLQ incision site that is also tender. General Surgery Progress note [id number]                                                                                                                                                                                                       Provider, please clarify if there is any correlation between Acute cystitis and nieves.           Are the conditions:      [  ] Due to or associated with each other   [ x Unrelated to each other   [  ] Other (Please Specify): _________________________   [  ] Clinically Undetermined                                                                                                                                                                                             "

## 2019-02-22 NOTE — PROGRESS NOTES
[facility]  Endocrinology  Progress Note    Admit Date: 1/23/2019     Reason for Consult: Management of T2DM, Hyperglycemia     Surgical Procedure and Date:      LAPAROTOMY, EXPLORATORY (N/A)     INCISION AND DRAINAGE, ABSCESS-  drainage of pelvic abscess (N/A)     EXCISION, ABSCESS- drainage abdominal wall abscess (N/A)     APPLICATION, WOUND VAC (N/A)       Diabetes diagnosis year: 20 years ago    Home Diabetes Medications:  Reports being taken off of insulin prior to admission.      Levemir 20 units BID     Humalog 16 units TIDWM with correction     How often checking glucose at home? >4 x day   BG readings on regimen: 110's  Hypoglycemia on the regimen?  Yes - 40's  Missed doses on regimen?  No    Diabetes Complications include:     Hypoglycemia     Complicating diabetes co morbidities:   HLD, CVA, and CAD    Lab Results   Component Value Date    HGBA1C 4.7 01/24/2019       HPI:   Patient is a 63 y.o. female with a diagnosis of sepsis and lactic acidosis secondary to abdominal abscess. Also has diagnosis of COPD, asthma, CAD, CVA (2012), and DM2. Patient receives primary care for DM in [place]. Her primary care giver is her daughter. Patient's DM is well controlled by primary Endocrinologist in [place]. Patient has not been on insulin regimen for the past 3 weeks leading up to hospitalization. According to daughter, patient was not eating, and having low BG reading. Therefore, insulin regimen was stopped by patient's primary Endocrinologist. Endocrinology at [facility] consulted to manage DM/hyperglycemia.             Interval HPI:   Overnight events: Patient developed NIKHIL and hyperkalemia. Started on insulin gtt yesterday and switched to low dose correction scale today. Received 1 unit pRBC for Hb <7. Planned for HIDA scan today. She is more awake and alert today.  Eating:   NPO  Nausea: No  Hypoglycemia and intervention: No  Fever: No  TPN and/or TF: No    BP (!) 151/126 (BP Location: Right arm,  "Patient Position: Lying)   Pulse 69   Temp 97.4 °F (36.3 °C) (Axillary) Comment (Src): Pt. on BiPap  Resp (!) 21   Ht 5' 1" (1.549 m)   Wt 87.1 kg (192 lb)   LMP  (LMP Unknown)   SpO2 100%   Breastfeeding? No   BMI 36.28 kg/m²      Labs Reviewed and Include    Recent Labs   Lab 02/22/19  0628   *   CALCIUM 9.5   ALBUMIN 2.5*   PROT 5.8*      K 5.9*   CO2 28      BUN 18   CREATININE 1.2   ALKPHOS 1,728*   ALT 27   *   BILITOT 5.2*     Lab Results   Component Value Date    WBC 15.24 (H) 02/22/2019    HGB 6.2 (L) 02/22/2019    HCT 20.7 (L) 02/22/2019    MCV 95 02/22/2019     02/22/2019     Recent Labs   Lab 02/19/19  1441   TSH 4.718*   FREET4 0.70*     Lab Results   Component Value Date    HGBA1C 4.7 01/24/2019       Nutritional status:   Body mass index is 36.28 kg/m².  Lab Results   Component Value Date    ALBUMIN 2.5 (L) 02/22/2019    ALBUMIN 2.4 (L) 02/22/2019    ALBUMIN 1.5 (L) 02/21/2019     Lab Results   Component Value Date    PREALBUMIN 3 (L) 02/21/2019    PREALBUMIN 4 (L) 02/18/2019    PREALBUMIN 5 (L) 02/14/2019       Estimated Creatinine Clearance: 48.1 mL/min (based on SCr of 1.2 mg/dL).    Accu-Checks  Recent Labs     02/21/19  2310 02/21/19  2358 02/22/19  0103 02/22/19  0207 02/22/19  0302 02/22/19  0403 02/22/19  0527 02/22/19  0611 02/22/19  0650 02/22/19  0759   POCTGLUCOSE 192* 162* 174* 163* 156* 149* 115* 113* 135* 139*       Current Medications and/or Treatments Impacting Glycemic Control  Immunotherapy:    Immunosuppressants     None        Steroids:   Hormones (From admission, onward)    Start     Stop Route Frequency Ordered    02/20/19 1115  hydrocortisone sodium succinate injection 100 mg      02/25 1359 IV Every 8 hours 02/20/19 1102        Pressors:    Autonomic Drugs (From admission, onward)    None        Hyperglycemia/Diabetes Medications:   Antihyperglycemics (From admission, onward)    Start     Stop Route Frequency Ordered    02/22/19 1036  " insulin aspart U-100 pen 0-5 Units      -- SubQ Every 4 hours PRN 02/22/19 0936          ASSESSMENT and PLAN    * Somnolence    Cortisol 4.2, albumin 1.4 (low albumin underestimates cortisol level & therefore makes results inaccurate)  Adrenal insufficiency unlikely given patient's clinical signs/symptoms and clinical picture  Would stop steroids especially in the setting of infection     Abnormal thyroid function test    TSH 4.718; 5 months ago was 1.563  Free T4 minimally low at .7 (nl range .71-1.51)  Likely 2/2 non-thyroidal illness (in the setting of severe illness) & not the cause of patient's current illness  Would not assess thyroid function in severe illness  Would repeat TFTs in 1-2 weeks once patient more stable     On enteral nutrition    Tube feeds currently being held  On D5+ 1/2NS  May elevate BG  Monitor accucheck q4 while patient NPO     Alteration in skin integrity related to surgical incision    Optimize BG control for surgical wound healing       Adverse effect of adrenal cortical steroids    On steroid therapy per primary team; may elevate BG readings         Overweight (BMI 25.0-29.9)    Body mass index is 36.28 kg/m².  may contribute to insulin resistance         Type 2 diabetes mellitus    BG goal 140 - 180   Currently NPO, on D5 +1/2 NS @75cc/hr  Switched from intensive insulin gtt to low dose correction scale as pt not requiring much insulin  Please alert endocrine team if patient started on tube feeds so that insulin can be further adjusted         Anastacio Brunson MD  Endocrinology  [facility]

## 2019-02-22 NOTE — HOSPITAL COURSE
64 yo F with PMHx CAD (on DAPT), HTN, PAF (on diltiazem), COPD/asthma, DM2, CVA (2012) w/ R residual deficit resulting in severe debility who was initially admitted on 1/23 for abdominal pain, poor po intake, nausea/vomiting associated with lactic acidosis / leukocytosis 2/2 R pelvic abscess s/p ex lap / washout / drainage on 1/25. Patient was eventually stepped down to floor on 2/8 however developed waxing/waning mentation; mental status acutely worsened on 2/19 prompting RRT and transfer back to SICU. CTH negative for acute intracranial abnormalities, ammonia level slightly elevated however for which she was started on lactulose and there is also concern for superimposed sepsis (started on vanc/Zosyn). Overnight patient noted to be bradycardic in the 40s with reported transient hypotension in 80s - resolved w/o intervention to 110s - for which Cardiology is reconsulted. Labs notable for persistent electrolyte derangements K 5.9 <-- 2.9, Mg 2.8 <-- 1.3. She is receiving diltiazem 60 mg Q8H (administered last 2/21 ~9 pm via ngt). Tele and EKGs reviewed which show junctional bradycardia.  Patient remains confused - unable to obtain ROS as a result.

## 2019-02-22 NOTE — SUBJECTIVE & OBJECTIVE
Interval History/Significant Events: Patient had brief episode of bradycardia last night into the 40s with BP in 80s/40s. Cards called and recommended d/c diltiazem and replacing lytes. HR now in 50-60s and BP stable. Also transfused last night x2 for Hg of 6.3. Continues to be responsive to name and moans throughout the day.     Follow-up For: Procedure(s) (LRB):  LAPAROTOMY, EXPLORATORY (N/A)  INCISION AND DRAINAGE, ABSCESS-  drainage of pelvic abscess (N/A)  EXCISION, ABSCESS- drainage abdominal wall abscess (N/A)  APPLICATION, WOUND VAC (N/A)    Post-Operative Day: 28 Days Post-Op    Objective:     Vital Signs (Most Recent):  Temp: 97.4 °F (36.3 °C) (02/22/19 0715)  Pulse: 69 (02/22/19 0746)  Resp: (!) 21 (02/22/19 0746)  BP: (!) 151/126 (02/22/19 0730)  SpO2: 100 % (02/22/19 0746) Vital Signs (24h Range):  Temp:  [97.4 °F (36.3 °C)-98.9 °F (37.2 °C)] 97.4 °F (36.3 °C)  Pulse:  [43-74] 69  Resp:  [16-38] 21  SpO2:  [92 %-100 %] 100 %  BP: (151)/(126) 151/126  Arterial Line BP: (108-173)/(45-72) 154/66     Weight: 87.1 kg (192 lb)  Body mass index is 36.28 kg/m².      Intake/Output Summary (Last 24 hours) at 2/22/2019 1003  Last data filed at 2/22/2019 0701  Gross per 24 hour   Intake 3277.76 ml   Output 1058 ml   Net 2219.76 ml       Physical Exam   Constitutional: She appears well-developed and well-nourished. She appears distressed.   HENT:   Head: Normocephalic and atraumatic.   Cardiovascular: Normal rate and regular rhythm.   Pulmonary/Chest:   On bipap   Abdominal: Soft. There is tenderness.   Soft, diffusely tender, moaning to palpation, midline firmness  Multiple areas of excoriation and skin breakdown noted to anterior abdomen   Musculoskeletal:   Difficult to examine extremities - R hand and arm swollen and moans in pain when trying to examine any part of her body   Neurological:   Not following commands, groaning in pain   Skin: Skin is warm and dry.   Excoriations on abdomen and buttocks    Psychiatric:   Opens eyes and moans to stimulation   Nursing note and vitals reviewed.      Vents:  Vent Mode: A/C (01/29/19 0140)  Ventilator Initiated: Yes (01/26/19 0239)  Set Rate: 15 bmp (01/29/19 0140)  Vt Set: 450 mL (01/29/19 0140)  Pressure Support: 5 cmH20 (01/29/19 0140)  PEEP/CPAP: 5 cmH20 (01/28/19 1050)  Oxygen Concentration (%): 50 (02/21/19 0706)  Peak Airway Pressure: 0 cmH2O (01/29/19 0140)  Plateau Pressure: 0 cmH20 (01/29/19 0140)  Total Ve: 0 mL (01/29/19 0140)  F/VT Ratio<105 (RSBI): (!) 26.57 (01/28/19 1050)    Lines/Drains/Airways     Central Venous Catheter Line                 Port A Cath Single Lumen 02/17/19 0930 right subclavian 5 days          Drain                 Gastrostomy/Enterostomy 02/11/19 LUQ 11 days         Urethral Catheter 02/17/19 1507 Straight-tip 16 Fr. 4 days         Rectal Tube 02/20/19 1600 rectal tube w/ balloon (indicate number of mLs) 1 day          Arterial Line                 Arterial Line 02/20/19 2030 Right Radial 1 day          Peripheral Intravenous Line                 Midline Catheter Insertion/Assessment  - Single Lumen 02/21/19 1440 Left brachial vein 18g x 8cm less than 1 day                Significant Labs:    CBC/Anemia Profile:  Recent Labs   Lab 02/20/19  1920 02/21/19  0315 02/22/19  0213   WBC 17.40* 17.44* 15.24*   HGB 7.4* 7.2* 6.2*   HCT 25.5* 24.0* 20.7*    172 156   MCV 96 93 95   RDW 18.0* 17.8* 18.3*        Chemistries:  Recent Labs   Lab 02/21/19  1902 02/22/19  0208 02/22/19  0628    139 138   K 2.9* 5.9* 5.9*   * 105 103   CO2 23 27 28   BUN 11 16 18   CREATININE 0.7 1.0 1.2   CALCIUM 7.1* 9.1 9.5   ALBUMIN 1.5* 2.4* 2.5*   PROT 3.9* 5.5* 5.8*   BILITOT 3.4* 4.8* 5.2*   ALKPHOS 1,247* 1,567* 1,728*   ALT 17 22 27   * 145* 200*   MG 1.3* 2.8* 2.9*   PHOS 2.4* 5.0* 5.3*     Significant Imaging:  I have reviewed and interpreted all pertinent imaging results/findings within the past 24 hours.

## 2019-02-22 NOTE — ASSESSMENT & PLAN NOTE
-LFTs, Tbili and Alk phos all increased  -CT showed biliary sludge  -consult hepatology - per recs this is severe cholestasis and likely related to medication or infection, not underlying hepatic pathology  - RUQ US on 2/20 showed thickened gallbladder wall and was equivocal for acute acalculous cholecystitis  - HIDA today to eval for acalculous cholecystitis

## 2019-02-22 NOTE — ASSESSMENT & PLAN NOTE
BG goal 140 - 180   Currently NPO, on D5 +1/2 NS @75cc/hr  Switched from intensive insulin gtt to low dose correction scale as pt not requiring much insulin  Please alert endocrine team if patient started on tube feeds so that insulin can be further adjusted

## 2019-02-22 NOTE — CONSULTS
[facility]  Cardiology  Consult Note    Patient Name: Sheryl Martines  MRN: [id number]  Admission Date: 1/23/2019  Hospital Length of Stay: 29 days  Code Status: Full Code   Attending Provider: Monster Laurent MD   Consulting Provider: Zulma Kwan MD  Primary Care Physician: Primary Doctor No  Principal Problem:Somnolence    Patient information was obtained from chart review and patient providers.     Inpatient consult to Cardiology  Consult performed by: Zulma Kwan MD  Consult ordered by: Zulma Kwan MD        Subjective:     Chief Complaint:  bradycardia     HPI / UPDATED HOSPITAL COURSE: 64 yo F with PMHx CAD (on DAPT), HTN, PAF (on diltiazem), COPD/asthma, DM2, CVA (2012) w/ R residual deficit resulting in severe debility who was initially admitted on 1/23 for abdominal pain, poor po intake, nausea/vomiting associated with lactic acidosis / leukocytosis 2/2 R pelvic abscess s/p ex lap / washout / drainage on 1/25. Patient was eventually stepped down to floor on 2/8 however developed waxing/waning mentation; mental status acutely worsened on 2/19 prompting RRT and transfer back to SICU. CTH negative for acute intracranial abnormalities, ammonia level slightly elevated however for which she was started on lactulose and there is also concern for superimposed sepsis (started on vanc/Zosyn). Overnight patient noted to be bradycardic in the 40s with reported transient hypotension in 80s - resolved w/o intervention to 110s - for which Cardiology is reconsulted. Labs notable for persistent electrolyte derangements K 5.9 <-- 2.9, Mg 2.8 <-- 1.3. She is receiving diltiazem 60 mg Q8H (administered last 2/21 ~9 pm via ngt). Tele and EKGs reviewed which show junctional bradycardia.  Patient remains confused - unable to obtain ROS as a result.    Of note patient was previously evaluated by Cardiology earlier this admission (1/27) for AIVR 2/2 metabolic derrangements  and troponin elevation 2/2 demand ischemia in setting of septic shock. LV function was normal with no WMAs on 2D echo.     Past Medical History:   Diagnosis Date    Abnormal LFTs 12/14/2018    Asthma     Closed compression fracture of fourth lumbar vertebra     COPD (chronic obstructive pulmonary disease)     Coronary artery disease     Diabetes mellitus     Fall 10/4/2018    Fracture     right tib & fib    Glaucoma     High cholesterol     Hypertension     Infected incision 9/20/2018    Intractable nausea and vomiting 1/23/2019    Iritis     Pulmonary embolus     S/P appendectomy 9/11/2018    Stroke     rt sided weakness.       Past Surgical History:   Procedure Laterality Date    ABDOMINAL SURGERY      APPENDECTOMY N/A 8/26/2018    [facility]    APPENDECTOMY, LAPAROSCOPIC---CONVERTED TO OPEN APPENDECTOMY @0950 N/A 8/26/2018    [facility] at [facility] OR    APPLICATION, WOUND VAC N/A 1/25/2019    Per[facility] by [facility] Laurent [facility] at [facility] OR [address] [place] [address]    BACK SURGERY      stimulator    CATARACT EXTRACTION      EXCISION, ABSCESS- drainage abdominal wall abscess N/A 1/25/2019    Performed by Monster Laurent MD at [facility] OR 2ND FLR    HYSTERECTOMY      INCISION AND DRAINAGE, ABSCESS-  drainage of pelvic abscess N/A 1/25/2019    Performed by Monster Laurent MD at [facility] OR [address] [place] [address]    LAPAROTOMY, EXPLORATORY N/A 1/25/2019    [facility]    TOTAL HIP ARTHROPLASTY Left     2008       Review of patient's allergies indicates:   Allergen Reactions    Ace inhibitors Swelling    Carvedilol      Other reaction(s): Other (See Comments)  blister    Hydralazine analogues     Metformin Nausea And Vomiting    Tetracycline Itching    Tetracyclines Swelling    Travatan (with benzalkonium) [travoprost (benzalkonium)]     Travoprost Itching     Other reaction(s): Other (See Comments)  Blurry  vision       No current facility-administered medications on file prior to encounter.      Current Outpatient Medications on File Prior to Encounter   Medication Sig    acetaminophen (TYLENOL) 500 MG tablet Take 1,000 mg by mouth 2 (two) times daily as needed for Pain.    albuterol (PROVENTIL/VENTOLIN HFA) 90 mcg/actuation inhaler Inhale 2 puffs into the lungs every 6 (six) hours as needed for Wheezing or Shortness of Breath. Rescue    albuterol-ipratropium (DUO-NEB) 2.5 mg-0.5 mg/3 mL nebulizer solution Take 3 mLs by nebulization every 4 (four) hours as needed for Wheezing or Shortness of Breath. Rescue     aspirin (ECOTRIN) 81 MG EC tablet Take 1 tablet (81 mg total) by mouth once daily.    baclofen (LIORESAL) 10 MG tablet Take 10 mg by mouth 2 (two) times daily as needed (MUSCLE SPASMS).    cefdinir (OMNICEF) 300 MG capsule TK ONE C PO  BID FOR 7 DAYS    cephALEXin (KEFLEX) 750 MG capsule TK ONE C PO TID FOR 5 DAYS    ciprofloxacin HCl (CIPRO) 250 MG tablet Take 1 tablet (250 mg total) by mouth every 12 (twelve) hours. Starting 12/15 evening    clopidogrel (PLAVIX) 75 mg tablet Take 75 mg by mouth once daily.    diclofenac sodium (VOLTAREN) 1 % Gel Apply 2 g topically daily as needed (PAIN).    diltiaZEM (CARDIZEM CD) 180 MG 24 hr capsule Take 180 mg by mouth once daily.    DULoxetine (CYMBALTA) 60 MG capsule Take 60 mg by mouth once daily.    fluticasone-vilanterol (BREO ELLIPTA) 100-25 mcg/dose diskus inhaler Inhale 1 puff into the lungs once daily. Controller    gabapentin (NEURONTIN) 300 MG capsule Take 300 mg by mouth once daily.    Lactobacillus rhamnosus-fiber 2.5 billion cell-3.5 gram PwPk Take 1 capsule by mouth.    lansoprazole (PREVACID) 30 MG capsule Take 30 mg by mouth once daily.    losartan (COZAAR) 100 MG tablet Take 100 mg by mouth once daily.     ondansetron (ZOFRAN) 4 MG tablet Take 1 tablet (4 mg total) by mouth every 6 (six) hours as needed.    ondansetron (ZOFRAN) 4 MG  tablet Take 4-8 mg by mouth.    potassium chloride SA (K-DUR,KLOR-CON) 10 MEQ tablet Take 10 mEq by mouth.    predniSONE (DELTASONE) 10 MG tablet Take 4 tablets (40 mg) on 12/16, then take 1 tablet (10 mg) daily    pyridoxine, vitamin B6, (VITAMIN B-6) 50 MG Tab Take 1 tablet (50 mg total) by mouth once daily.    simvastatin (ZOCOR) 40 MG tablet Take 40 mg by mouth.    theophylline (THEODUR) 300 mg 24 hr capsule Take 300 mg by mouth once daily.    traMADol (ULTRAM) 50 mg tablet TK 1 T PO BID PRN     Family History     Problem Relation (Age of Onset)    Cancer Father    Diabetes Brother    Lupus Sister    Multiple sclerosis Mother        Tobacco Use    Smoking status: Never Smoker    Smokeless tobacco: Never Used   Substance and Sexual Activity    Alcohol use: No     Frequency: Never    Drug use: No    Sexual activity: No     Partners: Male     Review of Systems   Unable to perform ROS: mental status change     Objective:     Vital Signs (Most Recent):  Temp: 98.2 °F (36.8 °C) (02/22/19 0300)  Pulse: (!) 43 (02/22/19 0320)  Resp: 20 (02/22/19 0320)  BP: 127/63 (02/21/19 0000)  SpO2: 100 % (02/22/19 0320) Vital Signs (24h Range):  Temp:  [98 °F (36.7 °C)-98.9 °F (37.2 °C)] 98.2 °F (36.8 °C)  Pulse:  [43-74] 43  Resp:  [16-38] 20  SpO2:  [92 %-100 %] 100 %  Arterial Line BP: (108-173)/(45-72) 110/53     Weight: 83 kg (183 lb)  Body mass index is 34.58 kg/m².    SpO2: 100 %  O2 Device (Oxygen Therapy): BiPAP      Intake/Output Summary (Last 24 hours) at 2/22/2019 0350  Last data filed at 2/22/2019 0300  Gross per 24 hour   Intake 3606.84 ml   Output 1345 ml   Net 2261.84 ml       Lines/Drains/Airways     Central Venous Catheter Line                 Port A Cath Single Lumen 02/17/19 0930 right subclavian 4 days          Drain                 Gastrostomy/Enterostomy 02/11/19 LUQ 11 days         Urethral Catheter 02/17/19 1507 Straight-tip 16 Fr. 4 days         Rectal Tube 02/20/19 1600 rectal tube w/ balloon  (indicate number of mLs) 1 day          Arterial Line                 Arterial Line 02/20/19 2030 Right Radial 1 day          Peripheral Intravenous Line                 Midline Catheter Insertion/Assessment  - Single Lumen 02/21/19 1440 Left brachial vein 18g x 8cm less than 1 day                Physical Exam   Constitutional:   Chronically ill appearing, in mild distress, uncooperative   HENT:   Head: Normocephalic and atraumatic.   On BiPAP   Cardiovascular:   Bradycardic, distant heart sounds masked by patient's incomprehensible noises / moaning   Pulmonary/Chest:   tachypniec    Genitourinary:   Genitourinary Comments: Ritchie in place draining concentrated urine   Musculoskeletal: She exhibits no edema.   Skin: Skin is warm and dry.       Significant Labs: All pertinent lab results from the last 24 hours have been reviewed.    Significant Imaging: Reviewed in EPIC    Assessment and Plan:     Bradycardia    62 yo F with multiple comorbidities admitted on 1/23 for septic shock 2/2 pelvic abscess s/p ex lap/ washout / drainage on 1/25. Hospital course prolonged and c/b mental status changes and now junctional bradycardia for which Cardiology is reconsulted. Her bradycardia (which is currently hemodynamically tolerated with SBPs in 110s) is all in the setting of significant electrolyte derangements with concurrent administration of an AVN blocking agent.     Recommendations:  -- Reverse metabolic derangements and normalize electrolyte levels (K, Mg). Would be cautious with KCl running with her IV fluids given that she is hyperkalemic; recommend adjusting the composition of her IV fluids as a result.  -- Hold diltiazem and avoid all AVN blockers for now.  -- No current indication for TVP or PPM insertion given likely reversible secondary causes. Have atropine available at the bedside in event of unstable bradyarrhythmia. May also consider dopamine infusion.        Addendum:    Telemetry reviewed this morning -  patient returned to NSR 60s. Junctional bradycardia resolved.     Continue to hold AVN blocking agents if HR persistently low.    Continue reversing metabolic/electrolyte derangements.    No further interventions from cardiology standpoint.   Will sign off. Do not hesitate to call with questions/concerns.     Discussed with Dr Vaughn.        Zulma Kwan MD  Cardiology   [facility]

## 2019-02-22 NOTE — NURSING
Patient became hypotensive with MAP's <60, HR-42. MD paged to bedside. EKG and labs obtained. EP consulted for persistent bradycardia. KENNY SAHNI

## 2019-02-22 NOTE — PROGRESS NOTES
"[facility]  General Surgery  Progress Note    Subjective:     History of Present Illness:  62 yo W with a history of HTN, COPD on home oxygen, HFpEF, IDDMII, CVA on plavix, HTN, debility after fall and broken hip, and PE who presents to the ED with a several month history of nausea, vomiting, inability to tolerate a diet and weight loss. She has had multiple admissions in the past few months for different ailments. She presents today with continued nausea, vomiting and abdominal pain that is mostly worse in the RLQ. During the examination, she states her pain was all over her abdomen because she was retching so much. She states that she has lost close to 40lbs since her surgery and that she only able to keep broth down. She had a lap converted to open appendectomy back in August 2018 that was complicated by a wound infection, C diff and failure to thrive. This seems to persists. She is now wheelchair bound (per her) and apparently lives in [place] but is here in [place] with her daughter.    She had a CT scan in the ED which revealed an irregular shaped rim enhancing fluid collection measuring at least 4.0 x 3.0 cm ight hemipelvis at the site of prior appendectomy. Surgery was consulted for further recommendations. She was also noted to have a "knot" at the RLQ incision site that is also tender.    Post-Op Info:  Procedure(s) (LRB):  LAPAROTOMY, EXPLORATORY (N/A)  INCISION AND DRAINAGE, ABSCESS-  drainage of pelvic abscess (N/A)  EXCISION, ABSCESS- drainage abdominal wall abscess (N/A)  APPLICATION, WOUND VAC (N/A)   28 Days Post-Op     Interval History:   Bradycardic to 40s o/n, BP stable. Cardiology consulted. Remains on bipap. Answering questions, appropriate this am.     Medications:  Continuous Infusions:   dextrose 5 % and 0.45 % NaCl 75 mL/hr at 02/22/19 0700    insulin (HUMAN R) infusion (adults) 0.1 Units/hr (02/22/19 0700)     Scheduled Meds:   albuterol-ipratropium  3 mL " Nebulization Q4H WAKE    aspirin  81 mg Oral Daily    atropine        fluconazole (DIFLUCAN) IVPB  400 mg Intravenous Q24H    fluticasone-vilanterol  1 puff Inhalation Daily    heparin (porcine)  5,000 Units Subcutaneous Q8H    hydrocortisone sodium succinate  100 mg Intravenous Q8H    lactulose  20 g Per G Tube BID    miconazole nitrate 2%   Topical (Top) BID    pantoprozole (PROTONIX) IV  40 mg Intravenous Daily    piperacillin-tazobactam (ZOSYN) IVPB  4.5 g Intravenous Q8H    senna-docusate 8.6-50 mg  1 tablet Per G Tube BID    vancomycin (VANCOCIN) IVPB  1,000 mg Intravenous Q24H     PRN Meds:sodium chloride, calcium gluconate IVPB, calcium gluconate IVPB, calcium gluconate IVPB, dextrose 50%, dextrose 50%, dextrose 50%, diclofenac sodium, diphenhydrAMINE, glucagon (human recombinant), hydrALAZINE, HYDROmorphone, magnesium sulfate IVPB, magnesium sulfate IVPB, omnipaque, omnipaque, ondansetron, potassium chloride 10%, potassium chloride 10%, potassium chloride 10%, potassium, sodium phosphates, potassium, sodium phosphates, potassium, sodium phosphates, sodium chloride 0.9%     Review of patient's allergies indicates:   Allergen Reactions    Ace inhibitors Swelling    Carvedilol      Other reaction(s): Other (See Comments)  blister    Hydralazine analogues     Metformin Nausea And Vomiting    Tetracycline Itching    Tetracyclines Swelling    Travatan (with benzalkonium) [travoprost (benzalkonium)]     Travoprost Itching     Other reaction(s): Other (See Comments)  Blurry vision     Objective:     Vital Signs (Most Recent):  Temp: 97.4 °F (36.3 °C) (02/22/19 0715)  Pulse: 69 (02/22/19 0746)  Resp: (!) 21 (02/22/19 0746)  BP: (!) 151/126 (02/22/19 0730)  SpO2: 100 % (02/22/19 0746) Vital Signs (24h Range):  Temp:  [97.4 °F (36.3 °C)-98.9 °F (37.2 °C)] 97.4 °F (36.3 °C)  Pulse:  [43-74] 69  Resp:  [16-38] 21  SpO2:  [92 %-100 %] 100 %  BP: (151)/(126) 151/126  Arterial Line BP: (108-173)/(45-72)  154/66     Weight: 87.1 kg (192 lb)  Body mass index is 36.28 kg/m².    Intake/Output - Last 3 Shifts       02/20 0700 - 02/21 0659 02/21 0700 - 02/22 0659 02/22 0700 - 02/23 0659    I.V. (mL/kg) 2020.1 (24.3) 1977.8 (22.7)     NG/ 130     IV Piggyback 3050 1300     Total Intake(mL/kg) 5355.1 (64.5) 3407.8 (39.1)     Urine (mL/kg/hr) 500 (0.3) 440 (0.2) 8 (0.1)    Drains       Stool 350 700     Total Output 850 1140 8    Net +4505.1 +2267.8 -8           Stool Occurrence 1 x            Physical Exam   Constitutional: She appears well-developed and well-nourished. She appears distressed.   HENT:   Head: Normocephalic and atraumatic.   Cardiovascular: Normal rate and regular rhythm.   Pulmonary/Chest:   On bipap   Abdominal: Soft.   Soft, diffusely tender, midline firmness  Multiple areas of excoriation and skin breakdown noted to anterior abdomen   Musculoskeletal:   R hand and arm swollen    Neurological:   Answering questions appropriately, oriented to self   Skin: Skin is warm and dry.   Excoriations on abdomen and buttocks   Vitals reviewed.    Significant Labs:  CBC:   Recent Labs   Lab 02/22/19  0213   WBC 15.24*   RBC 2.19*   HGB 6.2*   HCT 20.7*      MCV 95   MCH 28.3   MCHC 30.0*     BMP:   Recent Labs   Lab 02/22/19  0628   *      K 5.9*      CO2 28   BUN 18   CREATININE 1.2   CALCIUM 9.5   MG 2.9*     CMP:   Recent Labs   Lab 02/22/19  0628   *   CALCIUM 9.5   ALBUMIN 2.5*   PROT 5.8*      K 5.9*   CO2 28      BUN 18   CREATININE 1.2   ALKPHOS 1,728*   ALT 27   *   BILITOT 5.2*     LFTs:   Recent Labs   Lab 02/22/19  0628   ALT 27   *   ALKPHOS 1,728*   BILITOT 5.2*   PROT 5.8*   ALBUMIN 2.5*     Coagulation:   Recent Labs   Lab 02/18/19  1208 02/19/19  1441   LABPROT 11.8 11.6   INR 1.2 1.1   APTT 32.3*  --      ABGs:   Recent Labs   Lab 02/22/19  0322   PH 7.365   PCO2 48.6*   PO2 157*   HCO3 27.8   POCSATURATED 99   BE 2     RUQ US  2/20  FINDINGS:  Visualized portion of the pancreas are unremarkable.    Gallbladder wall is diffusely thickened measuring 1.1 cm.  There is no wall hyperemia.  No calcified gallstones are identified.  No pericholecystic fluid.  Sonographic Flores sign is equivocal due to patient's clinical status.  Small right pleural effusion.  Spleen measures 7.2 x 2.6 cm.  Visualized portions the liver are unremarkable.      Impression       Diffusely thickened gallbladder wall.  Equivocal assessment for acute cholecystitis.  Differential considerations include acalculous cholecystitis, or sequela due to hepatic dysfunction or cardiac dysfunction.  Advise clinical correlation.    Small right pleural effusion.         Assessment/Plan:     Acute respiratory failure with hypoxia and hypercarbia    Try to wean off bipap     Bilateral lower extremity edema    US this morning ruled out DVT     Abnormal thyroid function test    Endocrinology consulted: recommend against starting thyroid replacement hormone  Also recommended stopping hydrocortisone     Acute cystitis    UA showing many bacteria and 2+ leukocytes - nieves in place, candida, treating with diflucan     Neurological finding    - appreciate vascular neurology assistance  - likely acute encephalopathy related to infectious process, AMS improved this am     Urinary incontinence without sensory awareness    Nieves in place     Elevated liver enzymes    -LFTs, Tbili and Alk phos all increased  -CT showed biliary sludge  -consult hepatology - per recs this is severe cholestasis and likely related to medication or infection, not underlying hepatic pathology  - RUQ US on 2/20 showed thickened gallbladder wall and was equivocal for acute acalculous cholecystitis  - HIDA today to eval for acalculous cholecystitis     On enteral nutrition    TFs currently on hold     Dysarthria    -PEG placed 2/11/19, NPO, holding TF     Multiple skin tears    -Wound care following     Adverse effect of  adrenal cortical steroids    Currently on hydrocortisone 100 TID, this could be in part causing leukocytosis     Depression    -Continue Cymbalta     Debility    -PT/OT; history of fall with non displaced oblique right tibia fracture at metaphysis into diaphysis. Was wearing brace.  -Severely debilited; needs placement. SW consulted and following       Acute renal failure superimposed on stage 3 chronic kidney disease    Lasix held  UOP still low     Severe malnutrition    -hold TF for now, on TPN though likely contributing to cholestasis     Gastroesophageal reflux disease without esophagitis    -Continue BID protonix     (HFpEF) heart failure with preserved ejection fraction    Last echo 8/2018 with no WMAs, grade 1DD, EF 60%  -Strict I/O, daily weights  -holding diuresis     Moderate asthma without complication    -Continue with scheduled duonebs  -on BiPAP this AM - O2 and CO2 improved     Elevated troponin    -Cardiology consulted. On ASA/plavix at home     CAD (coronary artery disease)    -ASA, hold plavix in case HIDA     Type 2 diabetes mellitus    -SSI, appreciate endocrine assistance        HIDA today  If positive will plan for IR fernie tube  Will evaluate for transfer to [facility]    Sierra Real MD  General Surgery  [facility]

## 2019-02-22 NOTE — PLAN OF CARE
Problem: Adult Inpatient Plan of Care  Goal: Plan of Care Review  Outcome: Ongoing (interventions implemented as appropriate)  Patient bradycardic at a rate between 40-60 BPM this shift. EKG obtained, Cardiology consulted. Patient more alert, following simple commands. Agitated all shift, difficult to console. Electrolytes replaced per MD. Plan of care discussed with patient, emotional support provided. YUNIERS, BORA

## 2019-02-22 NOTE — NURSING
MD alcocer, Dr. Cortés notified of patients HR 42 BPM, MAP'S >65 and O2 sats 100% on BiPAP. No new orders, VSS WCTM

## 2019-02-22 NOTE — PROGRESS NOTES
[facility]  Critical Care - Surgery  Progress Note    Patient Name: Sheryl Martines  MRN: [id number]  Admission Date: 1/23/2019  Hospital Length of Stay: 29 days  Code Status: Full Code  Attending Provider: Monster Laurent MD  Primary Care Provider: Primary Doctor No   Principal Problem: Somnolence    Subjective:     Hospital/ICU Course:  02/19/19: Arrived to the SICU on BiPAP after rapid response was called to patient's room (1007). At that time patient was minimally interactive.  02/20/19: Patient remains somnolent overnight and on BiPAP. Lactic acid peaked at 3.0, remains on broad spectrum Abx, UA dirty, Urine Cx sent, Blood Cxs pending.    Interval History/Significant Events: Patient had brief episode of bradycardia last night into the 40s with BP in 80s/40s. Cards called and recommended d/c diltiazem and replacing lytes. HR now in 50-60s and BP stable. Also transfused last night x2 for Hg of 6.3. Continues to be responsive to name and moans throughout the day.     Follow-up For: Procedure(s) (LRB):  LAPAROTOMY, EXPLORATORY (N/A)  INCISION AND DRAINAGE, ABSCESS-  drainage of pelvic abscess (N/A)  EXCISION, ABSCESS- drainage abdominal wall abscess (N/A)  APPLICATION, WOUND VAC (N/A)    Post-Operative Day: 28 Days Post-Op    Objective:     Vital Signs (Most Recent):  Temp: 97.4 °F (36.3 °C) (02/22/19 0715)  Pulse: 69 (02/22/19 0746)  Resp: (!) 21 (02/22/19 0746)  BP: (!) 151/126 (02/22/19 0730)  SpO2: 100 % (02/22/19 0746) Vital Signs (24h Range):  Temp:  [97.4 °F (36.3 °C)-98.9 °F (37.2 °C)] 97.4 °F (36.3 °C)  Pulse:  [43-74] 69  Resp:  [16-38] 21  SpO2:  [92 %-100 %] 100 %  BP: (151)/(126) 151/126  Arterial Line BP: (108-173)/(45-72) 154/66     Weight: 87.1 kg (192 lb)  Body mass index is 36.28 kg/m².      Intake/Output Summary (Last 24 hours) at 2/22/2019 1003  Last data filed at 2/22/2019 0701  Gross per 24 hour   Intake 3277.76 ml   Output 1058 ml   Net 2219.76 ml       Physical Exam    Constitutional: She appears well-developed and well-nourished. She appears distressed.   HENT:   Head: Normocephalic and atraumatic.   Cardiovascular: Normal rate and regular rhythm.   Pulmonary/Chest:   On bipap   Abdominal: Soft. There is tenderness.   Soft, diffusely tender, moaning to palpation, midline firmness  Multiple areas of excoriation and skin breakdown noted to anterior abdomen   Musculoskeletal:   Difficult to examine extremities - R hand and arm swollen and moans in pain when trying to examine any part of her body   Neurological:   Not following commands, groaning in pain   Skin: Skin is warm and dry.   Excoriations on abdomen and buttocks   Psychiatric:   Opens eyes and moans to stimulation   Nursing note and vitals reviewed.      Vents:  Vent Mode: A/C (01/29/19 0140)  Ventilator Initiated: Yes (01/26/19 0239)  Set Rate: 15 bmp (01/29/19 0140)  Vt Set: 450 mL (01/29/19 0140)  Pressure Support: 5 cmH20 (01/29/19 0140)  PEEP/CPAP: 5 cmH20 (01/28/19 1050)  Oxygen Concentration (%): 50 (02/21/19 0706)  Peak Airway Pressure: 0 cmH2O (01/29/19 0140)  Plateau Pressure: 0 cmH20 (01/29/19 0140)  Total Ve: 0 mL (01/29/19 0140)  F/VT Ratio<105 (RSBI): (!) 26.57 (01/28/19 1050)    Lines/Drains/Airways     Central Venous Catheter Line                 Port A Cath Single Lumen 02/17/19 0930 right subclavian 5 days          Drain                 Gastrostomy/Enterostomy 02/11/19 LUQ 11 days         Urethral Catheter 02/17/19 1507 Straight-tip 16 Fr. 4 days         Rectal Tube 02/20/19 1600 rectal tube w/ balloon (indicate number of mLs) 1 day          Arterial Line                 Arterial Line 02/20/19 2030 Right Radial 1 day          Peripheral Intravenous Line                 Midline Catheter Insertion/Assessment  - Single Lumen 02/21/19 1440 Left brachial vein 18g x 8cm less than 1 day                Significant Labs:    CBC/Anemia Profile:  Recent Labs   Lab 02/20/19  1920 02/21/19  0315 02/22/19  0213   WBC  17.40* 17.44* 15.24*   HGB 7.4* 7.2* 6.2*   HCT 25.5* 24.0* 20.7*    172 156   MCV 96 93 95   RDW 18.0* 17.8* 18.3*        Chemistries:  Recent Labs   Lab 02/21/19  1902 02/22/19  0208 02/22/19  0628    139 138   K 2.9* 5.9* 5.9*   * 105 103   CO2 23 27 28   BUN 11 16 18   CREATININE 0.7 1.0 1.2   CALCIUM 7.1* 9.1 9.5   ALBUMIN 1.5* 2.4* 2.5*   PROT 3.9* 5.5* 5.8*   BILITOT 3.4* 4.8* 5.2*   ALKPHOS 1,247* 1,567* 1,728*   ALT 17 22 27   * 145* 200*   MG 1.3* 2.8* 2.9*   PHOS 2.4* 5.0* 5.3*     Significant Imaging:  I have reviewed and interpreted all pertinent imaging results/findings within the past 24 hours.    Assessment/Plan:     * Somnolence    63 y.o. female w/ a significant PMHx of COPD, CAD, T2DM, CKD, HTN, CVA w/ residual right sided deficits, significant debility, and recent exploratory laparotomy for pelvis mass/abscess that required an ICU admission postoperatively. Patient stepped back up to the SICU 02/19/19 s/p rapid response for somnolence and altered mental status.    Neuro:   Alert and responds to name. Not oriented to place or time. Last CT head negative. No focal neurological deficits. Unclear etiology.   - Continue lactulose BID  - Trend ammonia  - PRN pain medication IV    Pulmonary:   Patient w/ a Hx of hypercapnic respiratory failure on this admission requiring intubation. BiPAP Now. Questionable RUL opacification concerning for developing PNA on CT  - Low threshold to intubate  - CXR PRN  - Empiric abx for possible developing pneumonia    Cardiac:   Currently HDS, not requiring any pressors.   - Continuing home meds    Renal:    Questionable Hx of CKD with urine output tenuous at 20cc/hour. U/A dirty with yeast in culture. Blood cultures have been negative.   - Continue antifungals  - Monitor urine output with strict I/O   - Trend BUN/Cr    ID:   Afebrile over last 24H with stable leukocytosis. Lactate to 2.4 which cleared to 1.9 with fluids.  Questionable RUL  opacification in lungs.    - Continue vanc and zosyn and anti-fungal.   - Trend WBC w/ daily CBCs    Hem/Onc:   H/H 6.2/20.7. Transfused 2 units.   -Trend CBC  - Transfuse for Hgb < 7.0 g/dL    Endocrine:    Hx of T2DM. Endocrine consulted, appreciate assistance. Elevated TSH with low T4  - Accuchecks Q6H as patient NPO  - Low dose SSI Q6H    Fluids/Electrolytes/Nutrition/GI:   Patient with significantly elevated Alk phos and GGT with US that shows thickened wall. Concern for possible acalculous cholecystitis although etiology remains unclear.   - HIDA today  - Consider cholecystostomy tube, defer to primary team  - Replace electrolytes PRN  - TF with goal to 45    PPx:  - DVT: heparin 5000u subq Q8H  - Bowel: lactulose for 3-4 BMs/day  - PUP: Protonix 40 mg daily  - VAP: N/A    DISPO:  - Continue SICU care  - HIDA today  - Somnolence w/ multifactorial etiology likely  - Lactulose for elevated ammonia, titrate to effect/BMs  - BiPAP for hypercapnia, low threshold to intubate  - Trend lactic acid Q4H  - F/U blood cultures, respiratory cultures, urine cultures          Critical care was time spent personally by me on the following activities: development of treatment plan with patient or surrogate and bedside caregivers, discussions with consultants, evaluation of patient's response to treatment, examination of patient, ordering and performing treatments and interventions, ordering and review of laboratory studies, ordering and review of radiographic studies, pulse oximetry, re-evaluation of patient's condition.  This critical care time did not overlap with that of any other provider or involve time for any procedures.     Froylan Colmenares MD  Critical Care - Surgery  [facility]

## 2019-02-22 NOTE — PHYSICIAN QUERY
"PT Name: Sheryl Martines  MR #: [id number]    Physician Query Form - Pneumonia Clarification     CDS/: JUDY Ge,RNC-[email or web address]         Contact information:[email or web address]  This form is a permanent document in the medical record.    Query Date:  February 22, 2019    By submitting this query, we are merely seeking further clarification of documentation. Please utilize your independent clinical judgment when addressing the question(s) below.    The Medical record contains the following:   Indicators   Supporting Clinical Findings Location in Medical Record   X "Pneumonia" documented Questionable RUL opacification concerning for developing PNA  Critical Care Progress note 2/22@1047am   X Chest X-Ray: FINDINGS:  Cardiac wires overlie the chest.  Cardiac silhouette is not enlarged.  Right-sided central venous catheter and stimulator device is again present.  Lungs demonstrate bibasilar effusions and consolidative change/atelectasis, right side greater than left.  Aeration is slightly worse when compared with earlier today.  Impression  Slight interval worsening of lung aeration.    CXR 2/20    PaO2    PaCO2     O2 sat      Cultures      X Treatment  Patient w/ a Hx of hypercapnic respiratory failure on this admission requiring intubation. BiPAP Now    -Low threshold to intubate  - CXR PRN  - Empiric abx for possible developing pneumonia Critical Care Progress note 2/22@1047am    Supplemental O2     X Other Treatment: Pt presents with decreased control of secretions, palatal swelling and decreased lingual strength and coordination. PO trials held 2/2 aspiration risk Speech Therapy note 1/30@234pm       Provider, please specify type of pneumonia.    [   ] Bacterial Pneumonia (Specify organism):   [   ] Aspiration Pneumonia   [   ] Other type of pneumonia (please specify):   [ x ] Clinically undetermined         Please document in your progress notes daily for the duration of treatment, until resolved, " and include in your discharge summary.    .

## 2019-02-22 NOTE — ASSESSMENT & PLAN NOTE
Tube feeds currently being held  On D5+ 1/2NS  May elevate BG  Monitor accucheck q4 while patient NPO

## 2019-02-22 NOTE — PLAN OF CARE
Problem: Adult Inpatient Plan of Care  Goal: Plan of Care Review  Outcome: Ongoing (interventions implemented as appropriate)  For acute events that occurred throughout the shift see notes. See flowsheets for assessments and VS. Plan of care reviewed with Sheryl Martines and Sheryl Conrad's family. All questions answered in turn. Pt progressing towards goals as noted. Pt only responding to name, has bouts of yelling, resting comfortably now. PRN dilaudid q 4 for pain. VSS. Urine output still decreased, MD aware. Will continue to monitor.

## 2019-02-22 NOTE — ASSESSMENT & PLAN NOTE
63 y.o. female w/ a significant PMHx of COPD, CAD, T2DM, CKD, HTN, CVA w/ residual right sided deficits, significant debility, and recent exploratory laparotomy for pelvis mass/abscess that required an ICU admission postoperatively. Patient stepped back up to the SICU 02/19/19 s/p rapid response for somnolence and altered mental status.    Neuro:   Alert and responds to name. Not oriented to place or time. Last CT head negative. No focal neurological deficits. Unclear etiology.   - Continue lactulose BID  - Trend ammonia  - PRN pain medication IV    Pulmonary:   Patient w/ a Hx of hypercapnic respiratory failure on this admission requiring intubation. BiPAP Now. Questionable RUL opacification concerning for developing PNA on CT  - Low threshold to intubate  - CXR PRN  - Empiric abx for possible developing pneumonia    Cardiac:   Currently HDS, not requiring any pressors.   - Continuing home meds    Renal:    Questionable Hx of CKD with urine output tenuous at 20cc/hour. U/A dirty with yeast in culture. Blood cultures have been negative.   - Continue antifungals  - Monitor urine output with strict I/O   - Trend BUN/Cr    ID:   Afebrile over last 24H with stable leukocytosis. Lactate to 2.4 which cleared to 1.9 with fluids.  Questionable RUL opacification in lungs.    - Continue vanc and zosyn and anti-fungal.   - Trend WBC w/ daily CBCs    Hem/Onc:   H/H 6.2/20.7. Transfused 2 units.   -Trend CBC  - Transfuse for Hgb < 7.0 g/dL    Endocrine:    Hx of T2DM. Endocrine consulted, appreciate assistance. Elevated TSH with low T4  - Accuchecks Q6H as patient NPO  - Low dose SSI Q6H    Fluids/Electrolytes/Nutrition/GI:   Patient with significantly elevated Alk phos and GGT with US that shows thickened wall. Concern for possible acalculous cholecystitis although etiology remains unclear.   - HIDA today  - Consider cholecystostomy tube, defer to primary team  - Replace electrolytes PRN  - TF with goal to 45    PPx:  - DVT:  heparin 5000u subq Q8H  - Bowel: lactulose for 3-4 BMs/day  - PUP: Protonix 40 mg daily  - VAP: N/A    DISPO:  - Continue SICU care  - HIDA today  - Somnolence w/ multifactorial etiology likely  - Lactulose for elevated ammonia, titrate to effect/BMs  - BiPAP for hypercapnia, low threshold to intubate  - Trend lactic acid Q4H  - F/U blood cultures, respiratory cultures, urine cultures

## 2019-02-22 NOTE — SUBJECTIVE & OBJECTIVE
Interval History:   Bradycardic to 40s o/n, BP stable. Cardiology consulted. Remains on bipap. Answering questions, appropriate this am.     Medications:  Continuous Infusions:   dextrose 5 % and 0.45 % NaCl 75 mL/hr at 02/22/19 0700    insulin (HUMAN R) infusion (adults) 0.1 Units/hr (02/22/19 0700)     Scheduled Meds:   albuterol-ipratropium  3 mL Nebulization Q4H WAKE    aspirin  81 mg Oral Daily    atropine        fluconazole (DIFLUCAN) IVPB  400 mg Intravenous Q24H    fluticasone-vilanterol  1 puff Inhalation Daily    heparin (porcine)  5,000 Units Subcutaneous Q8H    hydrocortisone sodium succinate  100 mg Intravenous Q8H    lactulose  20 g Per G Tube BID    miconazole nitrate 2%   Topical (Top) BID    pantoprozole (PROTONIX) IV  40 mg Intravenous Daily    piperacillin-tazobactam (ZOSYN) IVPB  4.5 g Intravenous Q8H    senna-docusate 8.6-50 mg  1 tablet Per G Tube BID    vancomycin (VANCOCIN) IVPB  1,000 mg Intravenous Q24H     PRN Meds:sodium chloride, calcium gluconate IVPB, calcium gluconate IVPB, calcium gluconate IVPB, dextrose 50%, dextrose 50%, dextrose 50%, diclofenac sodium, diphenhydrAMINE, glucagon (human recombinant), hydrALAZINE, HYDROmorphone, magnesium sulfate IVPB, magnesium sulfate IVPB, omnipaque, omnipaque, ondansetron, potassium chloride 10%, potassium chloride 10%, potassium chloride 10%, potassium, sodium phosphates, potassium, sodium phosphates, potassium, sodium phosphates, sodium chloride 0.9%     Review of patient's allergies indicates:   Allergen Reactions    Ace inhibitors Swelling    Carvedilol      Other reaction(s): Other (See Comments)  blister    Hydralazine analogues     Metformin Nausea And Vomiting    Tetracycline Itching    Tetracyclines Swelling    Travatan (with benzalkonium) [travoprost (benzalkonium)]     Travoprost Itching     Other reaction(s): Other (See Comments)  Blurry vision     Objective:     Vital Signs (Most Recent):  Temp: 97.4 °F (36.3  °C) (02/22/19 0715)  Pulse: 69 (02/22/19 0746)  Resp: (!) 21 (02/22/19 0746)  BP: (!) 151/126 (02/22/19 0730)  SpO2: 100 % (02/22/19 0746) Vital Signs (24h Range):  Temp:  [97.4 °F (36.3 °C)-98.9 °F (37.2 °C)] 97.4 °F (36.3 °C)  Pulse:  [43-74] 69  Resp:  [16-38] 21  SpO2:  [92 %-100 %] 100 %  BP: (151)/(126) 151/126  Arterial Line BP: (108-173)/(45-72) 154/66     Weight: 87.1 kg (192 lb)  Body mass index is 36.28 kg/m².    Intake/Output - Last 3 Shifts       02/20 0700 - 02/21 0659 02/21 0700 - 02/22 0659 02/22 0700 - 02/23 0659    I.V. (mL/kg) 2020.1 (24.3) 1977.8 (22.7)     NG/ 130     IV Piggyback 3050 1300     Total Intake(mL/kg) 5355.1 (64.5) 3407.8 (39.1)     Urine (mL/kg/hr) 500 (0.3) 440 (0.2) 8 (0.1)    Drains       Stool 350 700     Total Output 850 1140 8    Net +4505.1 +2267.8 -8           Stool Occurrence 1 x            Physical Exam   Constitutional: She appears well-developed and well-nourished. She appears distressed.   HENT:   Head: Normocephalic and atraumatic.   Cardiovascular: Normal rate and regular rhythm.   Pulmonary/Chest:   On bipap   Abdominal: Soft.   Soft, diffusely tender, midline firmness  Multiple areas of excoriation and skin breakdown noted to anterior abdomen   Musculoskeletal:   R hand and arm swollen    Neurological:   Answering questions appropriately, oriented to self   Skin: Skin is warm and dry.   Excoriations on abdomen and buttocks   Vitals reviewed.    Significant Labs:  CBC:   Recent Labs   Lab 02/22/19  0213   WBC 15.24*   RBC 2.19*   HGB 6.2*   HCT 20.7*      MCV 95   MCH 28.3   MCHC 30.0*     BMP:   Recent Labs   Lab 02/22/19 0628   *      K 5.9*      CO2 28   BUN 18   CREATININE 1.2   CALCIUM 9.5   MG 2.9*     CMP:   Recent Labs   Lab 02/22/19 0628   *   CALCIUM 9.5   ALBUMIN 2.5*   PROT 5.8*      K 5.9*   CO2 28      BUN 18   CREATININE 1.2   ALKPHOS 1,728*   ALT 27   *   BILITOT 5.2*     LFTs:   Recent Labs    Lab 02/22/19  0628   ALT 27   *   ALKPHOS 1,728*   BILITOT 5.2*   PROT 5.8*   ALBUMIN 2.5*     Coagulation:   Recent Labs   Lab 02/18/19  1208 02/19/19  1441   LABPROT 11.8 11.6   INR 1.2 1.1   APTT 32.3*  --      ABGs:   Recent Labs   Lab 02/22/19  0322   PH 7.365   PCO2 48.6*   PO2 157*   HCO3 27.8   POCSATURATED 99   BE 2     RUQ US 2/20  FINDINGS:  Visualized portion of the pancreas are unremarkable.    Gallbladder wall is diffusely thickened measuring 1.1 cm.  There is no wall hyperemia.  No calcified gallstones are identified.  No pericholecystic fluid.  Sonographic Flores sign is equivocal due to patient's clinical status.  Small right pleural effusion.  Spleen measures 7.2 x 2.6 cm.  Visualized portions the liver are unremarkable.      Impression       Diffusely thickened gallbladder wall.  Equivocal assessment for acute cholecystitis.  Differential considerations include acalculous cholecystitis, or sequela due to hepatic dysfunction or cardiac dysfunction.  Advise clinical correlation.    Small right pleural effusion.

## 2019-02-22 NOTE — SUBJECTIVE & OBJECTIVE
Past Medical History:   Diagnosis Date    Abnormal LFTs 12/14/2018    Asthma     Closed compression fracture of fourth lumbar vertebra     COPD (chronic obstructive pulmonary disease)     Coronary artery disease     Diabetes mellitus     Fall 10/4/2018    Fracture     right tib & fib    Glaucoma     High cholesterol     Hypertension     Infected incision 9/20/2018    Intractable nausea and vomiting 1/23/2019    Iritis     Pulmonary embolus     S/P appendectomy 9/11/2018    Stroke     rt sided weakness.       Past Surgical History:   Procedure Laterality Date    ABDOMINAL SURGERY      APPENDECTOMY N/A 8/26/2018    Per[facility]    APPENDECTOMY, LAPAROSCOPIC---CONVERTED TO OPEN APPENDECTOMY @0950 N/A 8/26/2018    [facility] OR    APPLICATION, WOUND VAC N/A 1/25/2019    Performed by Monster Laurent MD at [facility] OR [address] [place] [address]    BACK SURGERY      stimulator    CATARACT EXTRACTION      EXCISION, ABSCESS- drainage abdominal wall abscess N/A 1/25/2019    Performed by Monster Laurent MD at [facility] OR [address] [place] [address]    HYSTERECTOMY      INCISION AND DRAINAGE, ABSCESS-  drainage of pelvic abscess N/A 1/25/2019    Performed by Monster Laurent MD at [facility] OR [address] [place] [address]    LAPAROTOMY, EXPLORATORY N/A 1/25/2019    Performed by Monster Laurent MD at [facility] OR [address] [place] [address]    TOTAL HIP ARTHROPLASTY Left     2008       Review of patient's allergies indicates:   Allergen Reactions    Ace inhibitors Swelling    Carvedilol      Other reaction(s): Other (See Comments)  blister    Hydralazine analogues     Metformin Nausea And Vomiting    Tetracycline Itching    Tetracyclines Swelling    Travatan (with benzalkonium) [travoprost (benzalkonium)]     Travoprost Itching     Other reaction(s): Other (See Comments)  Blurry vision       No current facility-administered medications on file prior to encounter.      Current Outpatient Medications on  File Prior to Encounter   Medication Sig    acetaminophen (TYLENOL) 500 MG tablet Take 1,000 mg by mouth 2 (two) times daily as needed for Pain.    albuterol (PROVENTIL/VENTOLIN HFA) 90 mcg/actuation inhaler Inhale 2 puffs into the lungs every 6 (six) hours as needed for Wheezing or Shortness of Breath. Rescue    albuterol-ipratropium (DUO-NEB) 2.5 mg-0.5 mg/3 mL nebulizer solution Take 3 mLs by nebulization every 4 (four) hours as needed for Wheezing or Shortness of Breath. Rescue     aspirin (ECOTRIN) 81 MG EC tablet Take 1 tablet (81 mg total) by mouth once daily.    baclofen (LIORESAL) 10 MG tablet Take 10 mg by mouth 2 (two) times daily as needed (MUSCLE SPASMS).    cefdinir (OMNICEF) 300 MG capsule TK ONE C PO  BID FOR 7 DAYS    cephALEXin (KEFLEX) 750 MG capsule TK ONE C PO TID FOR 5 DAYS    ciprofloxacin HCl (CIPRO) 250 MG tablet Take 1 tablet (250 mg total) by mouth every 12 (twelve) hours. Starting 12/15 evening    clopidogrel (PLAVIX) 75 mg tablet Take 75 mg by mouth once daily.    diclofenac sodium (VOLTAREN) 1 % Gel Apply 2 g topically daily as needed (PAIN).    diltiaZEM (CARDIZEM CD) 180 MG 24 hr capsule Take 180 mg by mouth once daily.    DULoxetine (CYMBALTA) 60 MG capsule Take 60 mg by mouth once daily.    fluticasone-vilanterol (BREO ELLIPTA) 100-25 mcg/dose diskus inhaler Inhale 1 puff into the lungs once daily. Controller    gabapentin (NEURONTIN) 300 MG capsule Take 300 mg by mouth once daily.    Lactobacillus rhamnosus-fiber 2.5 billion cell-3.5 gram PwPk Take 1 capsule by mouth.    lansoprazole (PREVACID) 30 MG capsule Take 30 mg by mouth once daily.    losartan (COZAAR) 100 MG tablet Take 100 mg by mouth once daily.     ondansetron (ZOFRAN) 4 MG tablet Take 1 tablet (4 mg total) by mouth every 6 (six) hours as needed.    ondansetron (ZOFRAN) 4 MG tablet Take 4-8 mg by mouth.    potassium chloride SA (K-DUR,KLOR-CON) 10 MEQ tablet Take 10 mEq by mouth.    predniSONE  (DELTASONE) 10 MG tablet Take 4 tablets (40 mg) on 12/16, then take 1 tablet (10 mg) daily    pyridoxine, vitamin B6, (VITAMIN B-6) 50 MG Tab Take 1 tablet (50 mg total) by mouth once daily.    simvastatin (ZOCOR) 40 MG tablet Take 40 mg by mouth.    theophylline (THEODUR) 300 mg 24 hr capsule Take 300 mg by mouth once daily.    traMADol (ULTRAM) 50 mg tablet TK 1 T PO BID PRN     Family History     Problem Relation (Age of Onset)    Cancer Father    Diabetes Brother    Lupus Sister    Multiple sclerosis Mother        Tobacco Use    Smoking status: Never Smoker    Smokeless tobacco: Never Used   Substance and Sexual Activity    Alcohol use: No     Frequency: Never    Drug use: No    Sexual activity: No     Partners: Male     Review of Systems   Unable to perform ROS: mental status change     Objective:     Vital Signs (Most Recent):  Temp: 98.2 °F (36.8 °C) (02/22/19 0300)  Pulse: (!) 43 (02/22/19 0320)  Resp: 20 (02/22/19 0320)  BP: 127/63 (02/21/19 0000)  SpO2: 100 % (02/22/19 0320) Vital Signs (24h Range):  Temp:  [98 °F (36.7 °C)-98.9 °F (37.2 °C)] 98.2 °F (36.8 °C)  Pulse:  [43-74] 43  Resp:  [16-38] 20  SpO2:  [92 %-100 %] 100 %  Arterial Line BP: (108-173)/(45-72) 110/53     Weight: 83 kg (183 lb)  Body mass index is 34.58 kg/m².    SpO2: 100 %  O2 Device (Oxygen Therapy): BiPAP      Intake/Output Summary (Last 24 hours) at 2/22/2019 0350  Last data filed at 2/22/2019 0300  Gross per 24 hour   Intake 3606.84 ml   Output 1345 ml   Net 2261.84 ml       Lines/Drains/Airways     Central Venous Catheter Line                 Port A Cath Single Lumen 02/17/19 0930 right subclavian 4 days          Drain                 Gastrostomy/Enterostomy 02/11/19 LUQ 11 days         Urethral Catheter 02/17/19 1507 Straight-tip 16 Fr. 4 days         Rectal Tube 02/20/19 1600 rectal tube w/ balloon (indicate number of mLs) 1 day          Arterial Line                 Arterial Line 02/20/19 2030 Right Radial 1 day           Peripheral Intravenous Line                 Midline Catheter Insertion/Assessment  - Single Lumen 02/21/19 1440 Left brachial vein 18g x 8cm less than 1 day                Physical Exam   Constitutional:   Chronically ill appearing, in mild distress, uncooperative   HENT:   Head: Normocephalic and atraumatic.   On BiPAP   Cardiovascular:   Bradycardic, distant heart sounds masked by patient's incomprehensible noises / moaning   Pulmonary/Chest:   tachypniec    Genitourinary:   Genitourinary Comments: Ritchie in place draining concentrated urine   Musculoskeletal: She exhibits no edema.   Skin: Skin is warm and dry.       Significant Labs: All pertinent lab results from the last 24 hours have been reviewed.    Significant Imaging: Reviewed in EPIC

## 2019-02-22 NOTE — NURSING
MD alcocer, Dr. Cortés notified patients H&H 6.2/20.7. 2 units of PRBC's to transfuse. Patient Bradycardic with MAP's >65. WCTM

## 2019-02-23 LAB
ALBUMIN SERPL BCP-MCNC: 2.1 G/DL
ALLENS TEST: ABNORMAL
ALP SERPL-CCNC: 1913 U/L
ALT SERPL W/O P-5'-P-CCNC: 38 U/L
AMMONIA PLAS-SCNC: 51 UMOL/L
ANION GAP SERPL CALC-SCNC: 9 MMOL/L
AST SERPL-CCNC: 252 U/L
BASOPHILS # BLD AUTO: 0.02 K/UL
BASOPHILS NFR BLD: 0.1 %
BILIRUB SERPL-MCNC: 6.7 MG/DL
BUN SERPL-MCNC: 19 MG/DL
CA-I BLDV-SCNC: 1.08 MMOL/L
CALCIUM SERPL-MCNC: 8.6 MG/DL
CHLORIDE SERPL-SCNC: 106 MMOL/L
CO2 SERPL-SCNC: 25 MMOL/L
CREAT SERPL-MCNC: 0.9 MG/DL
DELSYS: ABNORMAL
DIFFERENTIAL METHOD: ABNORMAL
EOSINOPHIL # BLD AUTO: 0 K/UL
EOSINOPHIL NFR BLD: 0 %
EP: 5
ERYTHROCYTE [DISTWIDTH] IN BLOOD BY AUTOMATED COUNT: 17.6 %
ERYTHROCYTE [SEDIMENTATION RATE] IN BLOOD BY WESTERGREN METHOD: 20 MM/H
ERYTHROCYTE [SEDIMENTATION RATE] IN BLOOD BY WESTERGREN METHOD: 20 MM/H
EST. GFR  (AFRICAN AMERICAN): >60 ML/MIN/1.73 M^2
EST. GFR  (NON AFRICAN AMERICAN): >60 ML/MIN/1.73 M^2
FIO2: 35
FLOW: 3
FLOW: 3
GLUCOSE SERPL-MCNC: 140 MG/DL
HCO3 UR-SCNC: 29.3 MMOL/L (ref 24–28)
HCO3 UR-SCNC: 29.4 MMOL/L (ref 24–28)
HCO3 UR-SCNC: 30.4 MMOL/L (ref 24–28)
HCT VFR BLD AUTO: 31 %
HGB BLD-MCNC: 9.4 G/DL
IMM GRANULOCYTES # BLD AUTO: 0.16 K/UL
IMM GRANULOCYTES NFR BLD AUTO: 1.2 %
IP: 10
LYMPHOCYTES # BLD AUTO: 1.3 K/UL
LYMPHOCYTES NFR BLD: 9.3 %
MAGNESIUM SERPL-MCNC: 2.3 MG/DL
MCH RBC QN AUTO: 29.3 PG
MCHC RBC AUTO-ENTMCNC: 30.3 G/DL
MCV RBC AUTO: 97 FL
MIN VOL: 12
MODE: ABNORMAL
MONOCYTES # BLD AUTO: 1 K/UL
MONOCYTES NFR BLD: 6.9 %
NEUTROPHILS # BLD AUTO: 11.3 K/UL
NEUTROPHILS NFR BLD: 82.5 %
NRBC BLD-RTO: 2 /100 WBC
PCO2 BLDA: 57.4 MMHG (ref 35–45)
PCO2 BLDA: 59.5 MMHG (ref 35–45)
PCO2 BLDA: 70.4 MMHG (ref 35–45)
PH SMN: 7.24 [PH] (ref 7.35–7.45)
PH SMN: 7.3 [PH] (ref 7.35–7.45)
PH SMN: 7.32 [PH] (ref 7.35–7.45)
PHOSPHATE SERPL-MCNC: 4.3 MG/DL
PLATELET # BLD AUTO: 160 K/UL
PMV BLD AUTO: 12.7 FL
PO2 BLDA: 157 MMHG (ref 80–100)
PO2 BLDA: 81 MMHG (ref 80–100)
PO2 BLDA: 95 MMHG (ref 80–100)
POC BE: 3 MMOL/L
POC SATURATED O2: 94 % (ref 95–100)
POC SATURATED O2: 96 % (ref 95–100)
POC SATURATED O2: 99 % (ref 95–100)
POC TCO2: 31 MMOL/L (ref 23–27)
POC TCO2: 31 MMOL/L (ref 23–27)
POC TCO2: 33 MMOL/L (ref 23–27)
POCT GLUCOSE: 126 MG/DL (ref 70–110)
POCT GLUCOSE: 145 MG/DL (ref 70–110)
POCT GLUCOSE: 148 MG/DL (ref 70–110)
POCT GLUCOSE: 158 MG/DL (ref 70–110)
POCT GLUCOSE: 160 MG/DL (ref 70–110)
POCT GLUCOSE: 183 MG/DL (ref 70–110)
POTASSIUM SERPL-SCNC: 4.4 MMOL/L
PROT SERPL-MCNC: 5.4 G/DL
RBC # BLD AUTO: 3.21 M/UL
SAMPLE: ABNORMAL
SITE: ABNORMAL
SODIUM SERPL-SCNC: 140 MMOL/L
SP02: 100
VANCOMYCIN SERPL-MCNC: 16.4 UG/ML
WBC # BLD AUTO: 13.69 K/UL

## 2019-02-23 PROCEDURE — 94640 AIRWAY INHALATION TREATMENT: CPT

## 2019-02-23 PROCEDURE — 82140 ASSAY OF AMMONIA: CPT

## 2019-02-23 PROCEDURE — 27000221 HC OXYGEN, UP TO 24 HOURS

## 2019-02-23 PROCEDURE — 36620 INSERTION CATHETER ARTERY: CPT

## 2019-02-23 PROCEDURE — 25000003 PHARM REV CODE 250: Performed by: STUDENT IN AN ORGANIZED HEALTH CARE EDUCATION/TRAINING PROGRAM

## 2019-02-23 PROCEDURE — S5010 5% DEXTROSE AND 0.45% SALINE: HCPCS | Performed by: STUDENT IN AN ORGANIZED HEALTH CARE EDUCATION/TRAINING PROGRAM

## 2019-02-23 PROCEDURE — 99232 SBSQ HOSP IP/OBS MODERATE 35: CPT | Mod: 24,,, | Performed by: SURGERY

## 2019-02-23 PROCEDURE — 25000003 PHARM REV CODE 250: Performed by: SURGERY

## 2019-02-23 PROCEDURE — 20000000 HC ICU ROOM

## 2019-02-23 PROCEDURE — 25000242 PHARM REV CODE 250 ALT 637 W/ HCPCS: Performed by: STUDENT IN AN ORGANIZED HEALTH CARE EDUCATION/TRAINING PROGRAM

## 2019-02-23 PROCEDURE — 63600175 PHARM REV CODE 636 W HCPCS: Performed by: STUDENT IN AN ORGANIZED HEALTH CARE EDUCATION/TRAINING PROGRAM

## 2019-02-23 PROCEDURE — 83735 ASSAY OF MAGNESIUM: CPT

## 2019-02-23 PROCEDURE — 82803 BLOOD GASES ANY COMBINATION: CPT

## 2019-02-23 PROCEDURE — 84100 ASSAY OF PHOSPHORUS: CPT

## 2019-02-23 PROCEDURE — 82330 ASSAY OF CALCIUM: CPT

## 2019-02-23 PROCEDURE — 37799 UNLISTED PX VASCULAR SURGERY: CPT

## 2019-02-23 PROCEDURE — 99232 PR SUBSEQUENT HOSPITAL CARE,LEVL II: ICD-10-PCS | Mod: 24,,, | Performed by: SURGERY

## 2019-02-23 PROCEDURE — 94761 N-INVAS EAR/PLS OXIMETRY MLT: CPT

## 2019-02-23 PROCEDURE — 80053 COMPREHEN METABOLIC PANEL: CPT

## 2019-02-23 PROCEDURE — 80202 ASSAY OF VANCOMYCIN: CPT

## 2019-02-23 PROCEDURE — 85025 COMPLETE CBC W/AUTO DIFF WBC: CPT

## 2019-02-23 PROCEDURE — 63600175 PHARM REV CODE 636 W HCPCS: Performed by: SURGERY

## 2019-02-23 PROCEDURE — C9113 INJ PANTOPRAZOLE SODIUM, VIA: HCPCS | Performed by: STUDENT IN AN ORGANIZED HEALTH CARE EDUCATION/TRAINING PROGRAM

## 2019-02-23 PROCEDURE — 94660 CPAP INITIATION&MGMT: CPT

## 2019-02-23 PROCEDURE — 99900035 HC TECH TIME PER 15 MIN (STAT)

## 2019-02-23 RX ORDER — FUROSEMIDE 20 MG/1
20 TABLET ORAL DAILY
Status: DISCONTINUED | OUTPATIENT
Start: 2019-02-23 | End: 2019-02-24

## 2019-02-23 RX ORDER — VANCOMYCIN HCL IN 5 % DEXTROSE 1G/250ML
1000 PLASTIC BAG, INJECTION (ML) INTRAVENOUS
Status: DISCONTINUED | OUTPATIENT
Start: 2019-02-23 | End: 2019-02-24

## 2019-02-23 RX ORDER — CLOPIDOGREL BISULFATE 75 MG/1
75 TABLET ORAL DAILY
Status: DISCONTINUED | OUTPATIENT
Start: 2019-02-23 | End: 2019-02-25

## 2019-02-23 RX ORDER — IPRATROPIUM BROMIDE AND ALBUTEROL SULFATE 2.5; .5 MG/3ML; MG/3ML
3 SOLUTION RESPIRATORY (INHALATION) EVERY 4 HOURS PRN
Status: DISCONTINUED | OUTPATIENT
Start: 2019-02-23 | End: 2019-02-24

## 2019-02-23 RX ADMIN — CLOPIDOGREL 75 MG: 75 TABLET, FILM COATED ORAL at 10:02

## 2019-02-23 RX ADMIN — LACTULOSE 20 G: 20 SOLUTION ORAL at 08:02

## 2019-02-23 RX ADMIN — HYDROMORPHONE HYDROCHLORIDE 0.5 MG: 1 INJECTION, SOLUTION INTRAMUSCULAR; INTRAVENOUS; SUBCUTANEOUS at 10:02

## 2019-02-23 RX ADMIN — PIPERACILLIN AND TAZOBACTAM 4.5 G: 4; .5 INJECTION, POWDER, LYOPHILIZED, FOR SOLUTION INTRAVENOUS; PARENTERAL at 03:02

## 2019-02-23 RX ADMIN — FUROSEMIDE 20 MG: 20 TABLET ORAL at 10:02

## 2019-02-23 RX ADMIN — PIPERACILLIN AND TAZOBACTAM 4.5 G: 4; .5 INJECTION, POWDER, LYOPHILIZED, FOR SOLUTION INTRAVENOUS; PARENTERAL at 10:02

## 2019-02-23 RX ADMIN — FLUCONAZOLE, SODIUM CHLORIDE 400 MG: 2 INJECTION INTRAVENOUS at 10:02

## 2019-02-23 RX ADMIN — HEPARIN SODIUM 5000 UNITS: 5000 INJECTION, SOLUTION INTRAVENOUS; SUBCUTANEOUS at 09:02

## 2019-02-23 RX ADMIN — HEPARIN SODIUM 5000 UNITS: 5000 INJECTION, SOLUTION INTRAVENOUS; SUBCUTANEOUS at 05:02

## 2019-02-23 RX ADMIN — HYDROMORPHONE HYDROCHLORIDE 0.5 MG: 1 INJECTION, SOLUTION INTRAMUSCULAR; INTRAVENOUS; SUBCUTANEOUS at 06:02

## 2019-02-23 RX ADMIN — IPRATROPIUM BROMIDE AND ALBUTEROL SULFATE 3 ML: .5; 3 SOLUTION RESPIRATORY (INHALATION) at 11:02

## 2019-02-23 RX ADMIN — IPRATROPIUM BROMIDE AND ALBUTEROL SULFATE 3 ML: .5; 3 SOLUTION RESPIRATORY (INHALATION) at 03:02

## 2019-02-23 RX ADMIN — MICONAZOLE NITRATE: 20 OINTMENT TOPICAL at 08:02

## 2019-02-23 RX ADMIN — PIPERACILLIN AND TAZOBACTAM 4.5 G: 4; .5 INJECTION, POWDER, LYOPHILIZED, FOR SOLUTION INTRAVENOUS; PARENTERAL at 06:02

## 2019-02-23 RX ADMIN — STANDARDIZED SENNA CONCENTRATE AND DOCUSATE SODIUM 1 TABLET: 8.6; 5 TABLET, FILM COATED ORAL at 08:02

## 2019-02-23 RX ADMIN — PANTOPRAZOLE SODIUM 40 MG: 40 INJECTION, POWDER, FOR SOLUTION INTRAVENOUS at 08:02

## 2019-02-23 RX ADMIN — HEPARIN SODIUM 5000 UNITS: 5000 INJECTION, SOLUTION INTRAVENOUS; SUBCUTANEOUS at 02:02

## 2019-02-23 RX ADMIN — DIPHENHYDRAMINE HYDROCHLORIDE 12.5 MG: 50 INJECTION, SOLUTION INTRAMUSCULAR; INTRAVENOUS at 06:02

## 2019-02-23 RX ADMIN — IPRATROPIUM BROMIDE AND ALBUTEROL SULFATE 3 ML: .5; 3 SOLUTION RESPIRATORY (INHALATION) at 07:02

## 2019-02-23 RX ADMIN — HYDROMORPHONE HYDROCHLORIDE 0.5 MG: 1 INJECTION, SOLUTION INTRAMUSCULAR; INTRAVENOUS; SUBCUTANEOUS at 08:02

## 2019-02-23 RX ADMIN — MICONAZOLE NITRATE: 20 OINTMENT TOPICAL at 09:02

## 2019-02-23 RX ADMIN — ASPIRIN 81 MG CHEWABLE TABLET 81 MG: 81 TABLET CHEWABLE at 08:02

## 2019-02-23 RX ADMIN — VANCOMYCIN HYDROCHLORIDE 1000 MG: 1 INJECTION, POWDER, LYOPHILIZED, FOR SOLUTION INTRAVENOUS at 02:02

## 2019-02-23 RX ADMIN — DEXTROSE AND SODIUM CHLORIDE: 5; .45 INJECTION, SOLUTION INTRAVENOUS at 01:02

## 2019-02-23 RX ADMIN — HYDROMORPHONE HYDROCHLORIDE 0.5 MG: 1 INJECTION, SOLUTION INTRAMUSCULAR; INTRAVENOUS; SUBCUTANEOUS at 04:02

## 2019-02-23 RX ADMIN — HYDROMORPHONE HYDROCHLORIDE 0.5 MG: 1 INJECTION, SOLUTION INTRAMUSCULAR; INTRAVENOUS; SUBCUTANEOUS at 01:02

## 2019-02-23 NOTE — ASSESSMENT & PLAN NOTE
63 y.o. female w/ a significant PMHx of COPD, CAD, T2DM, CKD, HTN, CVA w/ residual right sided deficits, significant debility, and recent exploratory laparotomy for pelvis mass/abscess that required an ICU admission postoperatively. Patient stepped back up to the SICU 02/19/19 s/p rapid response for somnolence and altered mental status.    Neuro:   Alert and responds to name. Now oriented to place. Last CT head negative. No focal neurological deficits. Unclear etiology. Ammonia stable in 50s.   - Continue lactulose BID  - Trend ammonia  - PRN pain medication IV    Pulmonary:   Patient w/ a Hx of hypercapnic respiratory failure on this admission requiring intubation. Now on nasal canula. Off BiPAP. Questionable RUL opacification concerning for developing PNA on CT  - Low threshold to intubate  - CXR PRN  - Empiric abx for possible developing pneumonia    Cardiac:   Currently HDS, not requiring any pressors.   - Continuing home meds    Renal:    Questionable Hx of CKD with urine output increased. U/A dirty with yeast in culture. Blood cultures have been negative.   - Continue antifungals  - Monitor urine output with strict I/O   - Trend BUN/Cr    ID:   Afebrile over last 24H with stable leukocytosis.Questionable RUL opacification in lungs.    - Continue vanc and zosyn and anti-fungal.   - Trend WBC w/ daily CBCs    Hem/Onc:   H/H 9.4/31  -Trend CBC  - Transfuse for Hgb < 7.0 g/dL    Endocrine:    Hx of T2DM. Endocrine consulted, appreciate assistance. Elevated TSH with low T4  - Accuchecks Q6H as patient NPO  - Low dose SSI Q6H    Fluids/Electrolytes/Nutrition/GI:   Patient with significantly elevated Alk phos and GGT with US that shows thickened wall. Concern for possible acalculous cholecystitis although etiology remains unclear. HIDA appears negative.   - Follow up official read from HIDA.   - Replace electrolytes PRN  - TF trickle today. Still remains aspiration risk.     PPx:  - DVT: heparin 5000u subq Q8H  -  Bowel: lactulose for 3-4 BMs/day  - PUP: Protonix 40 mg daily  - VAP: N/A    DISPO:  - Continue SICU care but will consider transfer to medicine.  - Follow up NARCISO colon.   - Somnolence w/ multifactorial etiology likely  - Lactulose for elevated ammonia, titrate to effect/BMs   - BiPAP for hypercapnia, low threshold to intubate  - Trend lactic acid Q4H  - F/U blood cultures, respiratory cultures, urine cultures

## 2019-02-23 NOTE — PROGRESS NOTES
"[facility]  General Surgery  Progress Note    Subjective:     History of Present Illness:  62 yo W with a history of HTN, COPD on home oxygen, HFpEF, IDDMII, CVA on plavix, HTN, debility after fall and broken hip, and PE who presents to the ED with a several month history of nausea, vomiting, inability to tolerate a diet and weight loss. She has had multiple admissions in the past few months for different ailments. She presents today with continued nausea, vomiting and abdominal pain that is mostly worse in the RLQ. During the examination, she states her pain was all over her abdomen because she was retching so much. She states that she has lost close to 40lbs since her surgery and that she only able to keep broth down. She had a lap converted to open appendectomy back in August 2018 that was complicated by a wound infection, C diff and failure to thrive. This seems to persists. She is now wheelchair bound (per her) and apparently lives in [place] but is here in [place] with her daughter.    She had a CT scan in the ED which revealed an irregular shaped rim enhancing fluid collection measuring at least 4.0 x 3.0 cm ight hemipelvis at the site of prior appendectomy. Surgery was consulted for further recommendations. She was also noted to have a "knot" at the RLQ incision site that is also tender.    Post-Op Info:  Procedure(s) (LRB):  LAPAROTOMY, EXPLORATORY (N/A)  INCISION AND DRAINAGE, ABSCESS-  drainage of pelvic abscess (N/A)  EXCISION, ABSCESS- drainage abdominal wall abscess (N/A)  APPLICATION, WOUND VAC (N/A)   29 Days Post-Op     Interval History:   Bradycardic to 40s o/n, BP stable. Cardiology consulted. Remains on bipap. Answering questions, appropriate this am.     Medications:  Continuous Infusions:   dextrose 5 % and 0.45 % NaCl 75 mL/hr at 02/23/19 0600     Scheduled Meds:   albuterol-ipratropium  3 mL Nebulization Q4H WAKE    aspirin  81 mg Oral Daily    fluconazole (DIFLUCAN) " IVPB  400 mg Intravenous Q24H    fluticasone-vilanterol  1 puff Inhalation Daily    heparin (porcine)  5,000 Units Subcutaneous Q8H    lactulose  20 g Per G Tube BID    miconazole nitrate 2%   Topical (Top) BID    pantoprozole (PROTONIX) IV  40 mg Intravenous Daily    piperacillin-tazobactam (ZOSYN) IVPB  4.5 g Intravenous Q8H    senna-docusate 8.6-50 mg  1 tablet Per G Tube BID     PRN Meds:sodium chloride, albuterol-ipratropium, calcium gluconate IVPB, calcium gluconate IVPB, calcium gluconate IVPB, dextrose 50%, diclofenac sodium, diphenhydrAMINE, glucagon (human recombinant), HYDROmorphone, insulin aspart U-100, magnesium sulfate IVPB, magnesium sulfate IVPB, ondansetron, potassium chloride 10%, potassium chloride 10%, potassium chloride 10%, potassium, sodium phosphates, potassium, sodium phosphates, potassium, sodium phosphates, sodium chloride 0.9%     Review of patient's allergies indicates:   Allergen Reactions    Ace inhibitors Swelling    Carvedilol      Other reaction(s): Other (See Comments)  blister    Hydralazine analogues     Metformin Nausea And Vomiting    Tetracycline Itching    Tetracyclines Swelling    Travatan (with benzalkonium) [travoprost (benzalkonium)]     Travoprost Itching     Other reaction(s): Other (See Comments)  Blurry vision     Objective:     Vital Signs (Most Recent):  Temp: 97.4 °F (36.3 °C) (02/23/19 0700)  Pulse: (!) 141 (02/23/19 0845)  Resp: 19 (02/23/19 0845)  BP: (!) 159/94 (02/23/19 0800)  SpO2: 100 % (02/23/19 0845) Vital Signs (24h Range):  Temp:  [96.9 °F (36.1 °C)-97.9 °F (36.6 °C)] 97.4 °F (36.3 °C)  Pulse:  [] 141  Resp:  [14-45] 19  SpO2:  [86 %-100 %] 100 %  BP: (151-174)/() 159/94  Arterial Line BP: (126-289)/() 170/79     Weight: 87.1 kg (192 lb)  Body mass index is 35.12 kg/m².    Intake/Output - Last 3 Shifts       02/21 0700 - 02/22 0659 02/22 0700 - 02/23 0659 02/23 0700 - 02/24 0659    I.V. (mL/kg) 1977.8 (22.7) 2113 (24.3)      Blood  600     NG/ 210 40    IV Piggyback 1300 300     Total Intake(mL/kg) 3407.8 (39.1) 3223 (37) 40 (0.5)    Urine (mL/kg/hr) 440 (0.2) 833 (0.4) 150 (0.7)    Stool 700 800     Total Output 1140 1633 150    Net +2267.8 +1590 -110                 Physical Exam   Constitutional: She appears well-developed and well-nourished.   HENT:   Head: Normocephalic and atraumatic.   Cardiovascular: Normal rate and regular rhythm.   Pulmonary/Chest:   On NC   Abdominal: Soft.   Soft, diffusely tender, midline firmness  Multiple areas of excoriation and skin breakdown noted to anterior abdomen   Musculoskeletal:   R hand and arm swollen    Neurological:   Answering some questions but moaning in pain, oriented to place   Skin: Skin is warm and dry.   Excoriations on abdomen and buttocks   Vitals reviewed.    Significant Labs:  CBC:   Recent Labs   Lab 02/23/19  0454   WBC 13.69*   RBC 3.21*   HGB 9.4*   HCT 31.0*      MCV 97   MCH 29.3   MCHC 30.3*     BMP:   Recent Labs   Lab 02/23/19  0454   *      K 4.4      CO2 25   BUN 19   CREATININE 0.9   CALCIUM 8.6*   MG 2.3     CMP:   Recent Labs   Lab 02/23/19  0454   *   CALCIUM 8.6*   ALBUMIN 2.1*   PROT 5.4*      K 4.4   CO2 25      BUN 19   CREATININE 0.9   ALKPHOS 1,913*   ALT 38   *   BILITOT 6.7*     LFTs:   Recent Labs   Lab 02/23/19  0454   ALT 38   *   ALKPHOS 1,913*   BILITOT 6.7*   PROT 5.4*   ALBUMIN 2.1*     Coagulation:   Recent Labs   Lab 02/18/19  1208 02/19/19  1441   LABPROT 11.8 11.6   INR 1.2 1.1   APTT 32.3*  --      ABGs:   Recent Labs   Lab 02/22/19  0322   PH 7.365   PCO2 48.6*   PO2 157*   HCO3 27.8   POCSATURATED 99   BE 2     RUQ US 2/20  FINDINGS:  Visualized portion of the pancreas are unremarkable.    Gallbladder wall is diffusely thickened measuring 1.1 cm.  There is no wall hyperemia.  No calcified gallstones are identified.  No pericholecystic fluid.  Sonographic Flores sign is  equivocal due to patient's clinical status.  Small right pleural effusion.  Spleen measures 7.2 x 2.6 cm.  Visualized portions the liver are unremarkable.      Impression       Diffusely thickened gallbladder wall.  Equivocal assessment for acute cholecystitis.  Differential considerations include acalculous cholecystitis, or sequela due to hepatic dysfunction or cardiac dysfunction.  Advise clinical correlation.    Small right pleural effusion.         Assessment/Plan:     Acute respiratory failure with hypoxia and hypercarbia    NC now, may need bipap again later     Bilateral lower extremity edema    US this morning ruled out DVT     Abnormal thyroid function test    Endocrinology consulted: recommend against starting thyroid replacement hormone  Also recommended stopping hydrocortisone     Acute cystitis    UA showing many bacteria and 2+ leukocytes - nieves in place, candida, treating with diflucan     Neurological finding    - appreciate vascular neurology assistance  - likely acute encephalopathy related to infectious process, AMS improved this am     Urinary incontinence without sensory awareness    Nieves in place     Elevated liver enzymes    -LFTs, Tbili and Alk phos all increased  -CT showed biliary sludge  -consult hepatology - per recs this is severe cholestasis and likely related to medication or infection, not underlying hepatic pathology  - RUQ US on 2/20 showed thickened gallbladder wall and was equivocal for acute acalculous cholecystitis  - HIDA appears negative f/u official read     On enteral nutrition    Restart TF     Dysarthria    -PEG placed 2/11/19, NPO, restart TF     Multiple skin tears    -Wound care following     Adverse effect of adrenal cortical steroids    Currently on hydrocortisone 100 TID, wean     Depression    -Continue Cymbalta     Debility    -PT/OT; history of fall with non displaced oblique right tibia fracture at metaphysis into diaphysis. Was wearing brace.  -Severely  debilited; needs placement. SW consulted and following       Acute renal failure superimposed on stage 3 chronic kidney disease    Cr stable, UOP low     Severe malnutrition    -restart TF     Gastroesophageal reflux disease without esophagitis    -Continue BID protonix     (HFpEF) heart failure with preserved ejection fraction    Last echo 8/2018 with no WMAs, grade 1DD, EF 60%  -Strict I/O, daily weights  -likely needs diuresis today     Moderate asthma without complication    -Continue with scheduled duonebs  -on NC this am     Elevated troponin    -Cardiology consulted. On ASA/plavix at home     CAD (coronary artery disease)    -ASA, restart plavix     Type 2 diabetes mellitus    -SSI, appreciate endocrine assistance        No current surgical issues, procedure 1/25, eval for transfer to MICU for continued management of CHF, AMS, CAD, DM, COPD, elevated LFTs    Sierra Real MD  General Surgery  [facility]

## 2019-02-23 NOTE — SUBJECTIVE & OBJECTIVE
Interval History:   Bradycardic to 40s o/n, BP stable. Cardiology consulted. Remains on bipap. Answering questions, appropriate this am.     Medications:  Continuous Infusions:   dextrose 5 % and 0.45 % NaCl 75 mL/hr at 02/23/19 0600     Scheduled Meds:   albuterol-ipratropium  3 mL Nebulization Q4H WAKE    aspirin  81 mg Oral Daily    fluconazole (DIFLUCAN) IVPB  400 mg Intravenous Q24H    fluticasone-vilanterol  1 puff Inhalation Daily    heparin (porcine)  5,000 Units Subcutaneous Q8H    lactulose  20 g Per G Tube BID    miconazole nitrate 2%   Topical (Top) BID    pantoprozole (PROTONIX) IV  40 mg Intravenous Daily    piperacillin-tazobactam (ZOSYN) IVPB  4.5 g Intravenous Q8H    senna-docusate 8.6-50 mg  1 tablet Per G Tube BID     PRN Meds:sodium chloride, albuterol-ipratropium, calcium gluconate IVPB, calcium gluconate IVPB, calcium gluconate IVPB, dextrose 50%, diclofenac sodium, diphenhydrAMINE, glucagon (human recombinant), HYDROmorphone, insulin aspart U-100, magnesium sulfate IVPB, magnesium sulfate IVPB, ondansetron, potassium chloride 10%, potassium chloride 10%, potassium chloride 10%, potassium, sodium phosphates, potassium, sodium phosphates, potassium, sodium phosphates, sodium chloride 0.9%     Review of patient's allergies indicates:   Allergen Reactions    Ace inhibitors Swelling    Carvedilol      Other reaction(s): Other (See Comments)  blister    Hydralazine analogues     Metformin Nausea And Vomiting    Tetracycline Itching    Tetracyclines Swelling    Travatan (with benzalkonium) [travoprost (benzalkonium)]     Travoprost Itching     Other reaction(s): Other (See Comments)  Blurry vision     Objective:     Vital Signs (Most Recent):  Temp: 97.4 °F (36.3 °C) (02/23/19 0700)  Pulse: (!) 141 (02/23/19 0845)  Resp: 19 (02/23/19 0845)  BP: (!) 159/94 (02/23/19 0800)  SpO2: 100 % (02/23/19 0845) Vital Signs (24h Range):  Temp:  [96.9 °F (36.1 °C)-97.9 °F (36.6 °C)] 97.4 °F  (36.3 °C)  Pulse:  [] 141  Resp:  [14-45] 19  SpO2:  [86 %-100 %] 100 %  BP: (151-174)/() 159/94  Arterial Line BP: (126-289)/() 170/79     Weight: 87.1 kg (192 lb)  Body mass index is 35.12 kg/m².    Intake/Output - Last 3 Shifts       02/21 0700 - 02/22 0659 02/22 0700 - 02/23 0659 02/23 0700 - 02/24 0659    I.V. (mL/kg) 1977.8 (22.7) 2113 (24.3)     Blood  600     NG/ 210 40    IV Piggyback 1300 300     Total Intake(mL/kg) 3407.8 (39.1) 3223 (37) 40 (0.5)    Urine (mL/kg/hr) 440 (0.2) 833 (0.4) 150 (0.7)    Stool 700 800     Total Output 1140 1633 150    Net +2267.8 +1590 -110                 Physical Exam   Constitutional: She appears well-developed and well-nourished.   HENT:   Head: Normocephalic and atraumatic.   Cardiovascular: Normal rate and regular rhythm.   Pulmonary/Chest:   On NC   Abdominal: Soft.   Soft, diffusely tender, midline firmness  Multiple areas of excoriation and skin breakdown noted to anterior abdomen   Musculoskeletal:   R hand and arm swollen    Neurological:   Answering some questions but moaning in pain, oriented to place   Skin: Skin is warm and dry.   Excoriations on abdomen and buttocks   Vitals reviewed.    Significant Labs:  CBC:   Recent Labs   Lab 02/23/19  0454   WBC 13.69*   RBC 3.21*   HGB 9.4*   HCT 31.0*      MCV 97   MCH 29.3   MCHC 30.3*     BMP:   Recent Labs   Lab 02/23/19  0454   *      K 4.4      CO2 25   BUN 19   CREATININE 0.9   CALCIUM 8.6*   MG 2.3     CMP:   Recent Labs   Lab 02/23/19  0454   *   CALCIUM 8.6*   ALBUMIN 2.1*   PROT 5.4*      K 4.4   CO2 25      BUN 19   CREATININE 0.9   ALKPHOS 1,913*   ALT 38   *   BILITOT 6.7*     LFTs:   Recent Labs   Lab 02/23/19  0454   ALT 38   *   ALKPHOS 1,913*   BILITOT 6.7*   PROT 5.4*   ALBUMIN 2.1*     Coagulation:   Recent Labs   Lab 02/18/19  1208 02/19/19  1441   LABPROT 11.8 11.6   INR 1.2 1.1   APTT 32.3*  --      ABGs:   Recent  Labs   Lab 02/22/19  0322   PH 7.365   PCO2 48.6*   PO2 157*   HCO3 27.8   POCSATURATED 99   BE 2     RUQ US 2/20  FINDINGS:  Visualized portion of the pancreas are unremarkable.    Gallbladder wall is diffusely thickened measuring 1.1 cm.  There is no wall hyperemia.  No calcified gallstones are identified.  No pericholecystic fluid.  Sonographic Flores sign is equivocal due to patient's clinical status.  Small right pleural effusion.  Spleen measures 7.2 x 2.6 cm.  Visualized portions the liver are unremarkable.      Impression       Diffusely thickened gallbladder wall.  Equivocal assessment for acute cholecystitis.  Differential considerations include acalculous cholecystitis, or sequela due to hepatic dysfunction or cardiac dysfunction.  Advise clinical correlation.    Small right pleural effusion.

## 2019-02-23 NOTE — PLAN OF CARE
"Problem: Adult Inpatient Plan of Care  Goal: Plan of Care Review  Outcome: Ongoing (interventions implemented as appropriate)  Dx: Somnolence     Shift Events: 2 units PRBC given. Pt. Traveled to [place] to HIDA scan.     Neuro: Arouses to voice and Follows commands. Pt. Moves all extremities spontaneously with exception of of RLE, which is baseline.    Vital Signs: BP (!) 151/79   Pulse 69   Temp 97.4 °F (36.3 °C) (Oral)   Resp 14   Ht 5' 2" (1.575 m)   Wt 87.1 kg (192 lb)   LMP  (LMP Unknown)   SpO2 100%   Breastfeeding? No   BMI 35.12 kg/m²     Intake/Gtts/Diet: Pt remains NPO    Output: UO 30cc/hr    Pain Management:: Dilaudid 0.5mg q4hr PRN     Labs: Lactic drawn Q4, Accuchecks Q4    Skin: Excoriation/skin tears noted to trunk. Midline abdominal incision, intact with dry drainage, edges approximated. Abcess site packed with wet to dry gauze per wound care order.         "

## 2019-02-23 NOTE — PLAN OF CARE
Reviewed BG levels and insulin regimen.      Goal BG while inpatient: 140-180 mg/dl  Current BG levels are at goal  Currently NPO, on D5 +1/2 NS @75cc/hr  IV steroid stop by primary, VS are stable    Recommendations:   - Switched from intensive insulin gtt to low dose correction scale as pt not requiring much insulin  - Q4 POCT glucose  - Q4 low dose correction scale    Please alert endocrine team if patient started on tube feeds so that insulin can be further adjusted       Abnormal thyroid function test  TSH 4.718; 5 months ago was 1.563  Free T4 minimally low at .7 (nl range .71-1.51)  Likely 2/2 non-thyroidal illness (in the setting of severe illness) & not the cause of patient's current illness  Would not assess thyroid function in severe illness  Would repeat TFTs in 1-2 weeks once patient more stable      We will follow with you, call with any questions    En Gallagher MD  Endocrine Fellow  2/23/2019

## 2019-02-23 NOTE — PLAN OF CARE
Problem: Adult Inpatient Plan of Care  Goal: Plan of Care Review  Outcome: Ongoing (interventions implemented as appropriate)  POC reviewed with patient. Pt remained confused and irritable during the night. She repeated moaned and groaned and yelled for help. When asked what was wrong she said she didn't know. PRN diladid given for possible pain relief. Mild relief noted. Antibiotics given during the night. Vital signs stable on 4L nasal cannuala. Urine output increased from about 10-15 to 30-40.  Questions and concerns addressed. No acute events overnight.  Pt progressing toward goals. Will continue to monitor.  See flow sheets for full assessment and VS info

## 2019-02-23 NOTE — ASSESSMENT & PLAN NOTE
Last echo 8/2018 with no WMAs, grade 1DD, EF 60%  -Strict I/O, daily weights  -likely needs diuresis today

## 2019-02-23 NOTE — SUBJECTIVE & OBJECTIVE
Interval History/Significant Events: No acute events overnight. Patient is more alert and oriented today. Knows who she is and where she is at. Still moaning periodically. HIDA yesterday was relatively normal. Received 2 units of blood.     Follow-up For: Procedure(s) (LRB):  LAPAROTOMY, EXPLORATORY (N/A)  INCISION AND DRAINAGE, ABSCESS-  drainage of pelvic abscess (N/A)  EXCISION, ABSCESS- drainage abdominal wall abscess (N/A)  APPLICATION, WOUND VAC (N/A)    Post-Operative Day: 29 Days Post-Op    Objective:     Vital Signs (Most Recent):  Temp: 97.4 °F (36.3 °C) (02/23/19 0700)  Pulse: (!) 141 (02/23/19 0845)  Resp: 19 (02/23/19 0845)  BP: (!) 159/94 (02/23/19 0800)  SpO2: 100 % (02/23/19 0845) Vital Signs (24h Range):  Temp:  [96.9 °F (36.1 °C)-97.9 °F (36.6 °C)] 97.4 °F (36.3 °C)  Pulse:  [] 141  Resp:  [14-45] 19  SpO2:  [86 %-100 %] 100 %  BP: (151-174)/() 159/94  Arterial Line BP: (126-289)/() 170/79     Weight: 87.1 kg (192 lb)  Body mass index is 35.12 kg/m².      Intake/Output Summary (Last 24 hours) at 2/23/2019 0953  Last data filed at 2/23/2019 0800  Gross per 24 hour   Intake 2908.04 ml   Output 1755 ml   Net 1153.04 ml       Physical Exam   Constitutional: She appears well-developed and well-nourished.   HENT:   Head: Normocephalic and atraumatic.   Cardiovascular: Normal rate and regular rhythm.   Pulmonary/Chest:   On NC   Abdominal: Soft.   Soft, diffusely tender, midline firmness  Multiple areas of excoriation and skin breakdown noted to anterior abdomen   Musculoskeletal:   R hand and arm swollen    Neurological:   Answering some questions but moaning in pain, oriented to place   Skin: Skin is warm and dry.   Excoriations on abdomen and buttocks   Nursing note and vitals reviewed.      Vents:  Vent Mode: A/C (01/29/19 0140)  Ventilator Initiated: Yes (01/26/19 0239)  Set Rate: 15 bmp (01/29/19 0140)  Vt Set: 450 mL (01/29/19 0140)  Pressure Support: 5 cmH20 (01/29/19  0140)  PEEP/CPAP: 5 cmH20 (01/28/19 1050)  Oxygen Concentration (%): 40 (02/22/19 1533)  Peak Airway Pressure: 0 cmH2O (01/29/19 0140)  Plateau Pressure: 0 cmH20 (01/29/19 0140)  Total Ve: 0 mL (01/29/19 0140)  F/VT Ratio<105 (RSBI): (!) 26.57 (01/28/19 1050)    Lines/Drains/Airways     Central Venous Catheter Line                 Port A Cath Single Lumen 02/17/19 0930 right subclavian 6 days          Drain                 Gastrostomy/Enterostomy 02/11/19 LUQ 12 days         Urethral Catheter 02/17/19 1507 Straight-tip 16 Fr. 5 days         Rectal Tube 02/20/19 1600 rectal tube w/ balloon (indicate number of mLs) 2 days          Arterial Line                 Arterial Line 02/20/19 2030 Right Radial 2 days          Peripheral Intravenous Line                 Midline Catheter Insertion/Assessment  - Single Lumen 02/21/19 1440 Left brachial vein 18g x 8cm 1 day                Significant Labs:    CBC/Anemia Profile:  Recent Labs   Lab 02/22/19  0213 02/22/19  1020 02/23/19  0454   WBC 15.24* 21.42* 13.69*   HGB 6.2* 10.6* 9.4*   HCT 20.7* 32.7* 31.0*    155 160   MCV 95 92 97   RDW 18.3* 16.3* 17.6*        Chemistries:  Recent Labs   Lab 02/22/19  0208 02/22/19  0628 02/22/19  1020 02/23/19  0454    138 139 140   K 5.9* 5.9* 5.6* 4.4    103 103 106   CO2 27 28 28 25   BUN 16 18 18 19   CREATININE 1.0 1.2 1.2 0.9   CALCIUM 9.1 9.5 9.8 8.6*   ALBUMIN 2.4* 2.5*  --  2.1*   PROT 5.5* 5.8*  --  5.4*   BILITOT 4.8* 5.2*  --  6.7*   ALKPHOS 1,567* 1,728*  --  1,913*   ALT 22 27  --  38   * 200*  --  252*   MG 2.8* 2.9*  --  2.3   PHOS 5.0* 5.3*  --  4.3         Significant Imaging:  I have reviewed and interpreted all pertinent imaging results/findings within the past 24 hours.

## 2019-02-23 NOTE — PROGRESS NOTES
[facility]  Critical Care - Surgery  Progress Note    Patient Name: Sheryl Martines  MRN: [id number]  Admission Date: 1/23/2019  Hospital Length of Stay: 30 days  Code Status: Full Code  Attending Provider: Monster Laurent MD  Primary Care Provider: Primary Doctor No   Principal Problem: Somnolence    Subjective:     Hospital/ICU Course:  02/19/19: Arrived to the SICU on BiPAP after rapid response was called to patient's room (1007). At that time patient was minimally interactive.  02/20/19: Patient remains somnolent overnight and on BiPAP. Lactic acid peaked at 3.0, remains on broad spectrum Abx, UA dirty, Urine Cx sent, Blood Cxs pending.    Interval History/Significant Events: No acute events overnight. Patient is more alert and oriented today. Knows who she is and where she is at. Still moaning periodically. HIDA yesterday was relatively normal. Received 2 units of blood.     Follow-up For: Procedure(s) (LRB):  LAPAROTOMY, EXPLORATORY (N/A)  INCISION AND DRAINAGE, ABSCESS-  drainage of pelvic abscess (N/A)  EXCISION, ABSCESS- drainage abdominal wall abscess (N/A)  APPLICATION, WOUND VAC (N/A)    Post-Operative Day: 29 Days Post-Op    Objective:     Vital Signs (Most Recent):  Temp: 97.4 °F (36.3 °C) (02/23/19 0700)  Pulse: (!) 141 (02/23/19 0845)  Resp: 19 (02/23/19 0845)  BP: (!) 159/94 (02/23/19 0800)  SpO2: 100 % (02/23/19 0845) Vital Signs (24h Range):  Temp:  [96.9 °F (36.1 °C)-97.9 °F (36.6 °C)] 97.4 °F (36.3 °C)  Pulse:  [] 141  Resp:  [14-45] 19  SpO2:  [86 %-100 %] 100 %  BP: (151-174)/() 159/94  Arterial Line BP: (126-289)/() 170/79     Weight: 87.1 kg (192 lb)  Body mass index is 35.12 kg/m².      Intake/Output Summary (Last 24 hours) at 2/23/2019 0953  Last data filed at 2/23/2019 0800  Gross per 24 hour   Intake 2908.04 ml   Output 1755 ml   Net 1153.04 ml       Physical Exam   Constitutional: She appears well-developed and well-nourished.   HENT:   Head:  Normocephalic and atraumatic.   Cardiovascular: Normal rate and regular rhythm.   Pulmonary/Chest:   On NC   Abdominal: Soft.   Soft, diffusely tender, midline firmness  Multiple areas of excoriation and skin breakdown noted to anterior abdomen   Musculoskeletal:   R hand and arm swollen    Neurological:   Answering some questions but moaning in pain, oriented to place   Skin: Skin is warm and dry.   Excoriations on abdomen and buttocks   Nursing note and vitals reviewed.      Vents:  Vent Mode: A/C (01/29/19 0140)  Ventilator Initiated: Yes (01/26/19 0239)  Set Rate: 15 bmp (01/29/19 0140)  Vt Set: 450 mL (01/29/19 0140)  Pressure Support: 5 cmH20 (01/29/19 0140)  PEEP/CPAP: 5 cmH20 (01/28/19 1050)  Oxygen Concentration (%): 40 (02/22/19 1533)  Peak Airway Pressure: 0 cmH2O (01/29/19 0140)  Plateau Pressure: 0 cmH20 (01/29/19 0140)  Total Ve: 0 mL (01/29/19 0140)  F/VT Ratio<105 (RSBI): (!) 26.57 (01/28/19 1050)    Lines/Drains/Airways     Central Venous Catheter Line                 Port A Cath Single Lumen 02/17/19 0930 right subclavian 6 days          Drain                 Gastrostomy/Enterostomy 02/11/19 LUQ 12 days         Urethral Catheter 02/17/19 1507 Straight-tip 16 Fr. 5 days         Rectal Tube 02/20/19 1600 rectal tube w/ balloon (indicate number of mLs) 2 days          Arterial Line                 Arterial Line 02/20/19 2030 Right Radial 2 days          Peripheral Intravenous Line                 Midline Catheter Insertion/Assessment  - Single Lumen 02/21/19 1440 Left brachial vein 18g x 8cm 1 day                Significant Labs:    CBC/Anemia Profile:  Recent Labs   Lab 02/22/19  0213 02/22/19  1020 02/23/19  0454   WBC 15.24* 21.42* 13.69*   HGB 6.2* 10.6* 9.4*   HCT 20.7* 32.7* 31.0*    155 160   MCV 95 92 97   RDW 18.3* 16.3* 17.6*        Chemistries:  Recent Labs   Lab 02/22/19  0208 02/22/19  0628 02/22/19  1020 02/23/19  0454    138 139 140   K 5.9* 5.9* 5.6* 4.4    103 103  106   CO2 27 28 28 25   BUN 16 18 18 19   CREATININE 1.0 1.2 1.2 0.9   CALCIUM 9.1 9.5 9.8 8.6*   ALBUMIN 2.4* 2.5*  --  2.1*   PROT 5.5* 5.8*  --  5.4*   BILITOT 4.8* 5.2*  --  6.7*   ALKPHOS 1,567* 1,728*  --  1,913*   ALT 22 27  --  38   * 200*  --  252*   MG 2.8* 2.9*  --  2.3   PHOS 5.0* 5.3*  --  4.3         Significant Imaging:  I have reviewed and interpreted all pertinent imaging results/findings within the past 24 hours.    Assessment/Plan:     * Somnolence    63 y.o. female w/ a significant PMHx of COPD, CAD, T2DM, CKD, HTN, CVA w/ residual right sided deficits, significant debility, and recent exploratory laparotomy for pelvis mass/abscess that required an ICU admission postoperatively. Patient stepped back up to the SICU 02/19/19 s/p rapid response for somnolence and altered mental status.    Neuro:   Alert and responds to name. Now oriented to place. Last CT head negative. No focal neurological deficits. Unclear etiology. Ammonia stable in 50s.   - Continue lactulose BID  - Trend ammonia  - PRN pain medication IV    Pulmonary:   Patient w/ a Hx of hypercapnic respiratory failure on this admission requiring intubation. Now on nasal canula. Off BiPAP. Questionable RUL opacification concerning for developing PNA on CT  - Low threshold to intubate  - CXR PRN  - Empiric abx for possible developing pneumonia    Cardiac:   Currently HDS, not requiring any pressors.   - Continuing home meds    Renal:    Questionable Hx of CKD with urine output increased. U/A dirty with yeast in culture. Blood cultures have been negative.   - Continue antifungals  - Monitor urine output with strict I/O   - Trend BUN/Cr    ID:   Afebrile over last 24H with stable leukocytosis.Questionable RUL opacification in lungs.    - Continue vanc and zosyn and anti-fungal.   - Trend WBC w/ daily CBCs    Hem/Onc:   H/H 9.4/31  -Trend CBC  - Transfuse for Hgb < 7.0 g/dL    Endocrine:    Hx of T2DM. Endocrine consulted, appreciate  assistance. Elevated TSH with low T4  - Accuchecks Q6H as patient NPO  - Low dose SSI Q6H    Fluids/Electrolytes/Nutrition/GI:   Patient with significantly elevated Alk phos and GGT with US that shows thickened wall. Concern for possible acalculous cholecystitis although etiology remains unclear. HIDA appears negative.   - Follow up official read from HIDA.   - Replace electrolytes PRN  - TF trickle today. Still remains aspiration risk.     PPx:  - DVT: heparin 5000u subq Q8H  - Bowel: lactulose for 3-4 BMs/day  - PUP: Protonix 40 mg daily  - VAP: N/A    DISPO:  - Continue SICU care but will consider transfer to medicine.  - Follow up HIDA read.   - Somnolence w/ multifactorial etiology likely  - Lactulose for elevated ammonia, titrate to effect/BMs   - BiPAP for hypercapnia, low threshold to intubate  - Trend lactic acid Q4H  - F/U blood cultures, respiratory cultures, urine cultures              Critical care was time spent personally by me on the following activities: development of treatment plan with patient or surrogate and bedside caregivers, discussions with consultants, evaluation of patient's response to treatment, examination of patient, ordering and performing treatments and interventions, ordering and review of laboratory studies, ordering and review of radiographic studies, pulse oximetry, re-evaluation of patient's condition.  This critical care time did not overlap with that of any other provider or involve time for any procedures.     Froylan Colmenares MD  Critical Care - Surgery  [facility]

## 2019-02-23 NOTE — ASSESSMENT & PLAN NOTE
-LFTs, Tbili and Alk phos all increased  -CT showed biliary sludge  -consult hepatology - per recs this is severe cholestasis and likely related to medication or infection, not underlying hepatic pathology  - RUQ US on 2/20 showed thickened gallbladder wall and was equivocal for acute acalculous cholecystitis  - HIDA appears negative f/u official read

## 2019-02-23 NOTE — ASSESSMENT & PLAN NOTE
- appreciate vascular neurology assistance  - likely acute encephalopathy related to infectious process, AMS improved this am

## 2019-02-23 NOTE — CARE UPDATE
Pt disconnected from wall monitor and connected to portable monitor. Pt transferred to [facility] - portable telemetry and ambu bag present. HIDA scan Obtained. Pt transferred back to SICU [id number] and reconnected to wall monitor. Pt reassessed, findings concur with prior assessments. Vital signs remained appropriate for this particular patient during transport and during of procedure.

## 2019-02-24 PROBLEM — A49.8 CLOSTRIDIUM DIFFICILE INFECTION: Status: RESOLVED | Noted: 2018-08-30 | Resolved: 2019-02-24

## 2019-02-24 PROBLEM — J45.901 EXACERBATION OF ASTHMA: Status: RESOLVED | Noted: 2018-12-12 | Resolved: 2019-02-24

## 2019-02-24 PROBLEM — S82.201A CLOSED FRACTURE OF RIGHT TIBIA AND FIBULA: Status: RESOLVED | Noted: 2018-10-03 | Resolved: 2019-02-24

## 2019-02-24 PROBLEM — N17.9 ACUTE RENAL FAILURE SUPERIMPOSED ON STAGE 3 CHRONIC KIDNEY DISEASE: Status: RESOLVED | Noted: 2018-12-13 | Resolved: 2019-02-24

## 2019-02-24 PROBLEM — N30.01 ACUTE CYSTITIS WITH HEMATURIA: Status: RESOLVED | Noted: 2018-12-12 | Resolved: 2019-02-24

## 2019-02-24 PROBLEM — R10.84 GENERALIZED ABDOMINAL PAIN: Status: ACTIVE | Noted: 2019-02-24

## 2019-02-24 PROBLEM — N18.30 ACUTE RENAL FAILURE SUPERIMPOSED ON STAGE 3 CHRONIC KIDNEY DISEASE: Status: RESOLVED | Noted: 2018-12-13 | Resolved: 2019-02-24

## 2019-02-24 PROBLEM — I50.33 ACUTE ON CHRONIC DIASTOLIC (CONGESTIVE) HEART FAILURE: Status: ACTIVE | Noted: 2018-09-11

## 2019-02-24 PROBLEM — S82.401A CLOSED FRACTURE OF RIGHT TIBIA AND FIBULA: Status: RESOLVED | Noted: 2018-10-03 | Resolved: 2019-02-24

## 2019-02-24 PROBLEM — R29.818 NEUROLOGICAL FINDING: Status: RESOLVED | Noted: 2019-02-18 | Resolved: 2019-02-24

## 2019-02-24 LAB
ALBUMIN SERPL BCP-MCNC: 2.1 G/DL
ALLENS TEST: ABNORMAL
ALP SERPL-CCNC: 2140 U/L
ALT SERPL W/O P-5'-P-CCNC: 47 U/L
AMMONIA PLAS-SCNC: 64 UMOL/L
ANION GAP SERPL CALC-SCNC: 9 MMOL/L
AST SERPL-CCNC: 233 U/L
BACTERIA BLD CULT: NORMAL
BACTERIA BLD CULT: NORMAL
BASOPHILS # BLD AUTO: 0.01 K/UL
BASOPHILS NFR BLD: 0.1 %
BILIRUB SERPL-MCNC: 7.1 MG/DL
BNP SERPL-MCNC: >4900 PG/ML
BUN SERPL-MCNC: 20 MG/DL
CA-I BLDV-SCNC: 1.1 MMOL/L
CALCIUM SERPL-MCNC: 8.8 MG/DL
CHLORIDE SERPL-SCNC: 105 MMOL/L
CO2 SERPL-SCNC: 28 MMOL/L
CREAT SERPL-MCNC: 1 MG/DL
DELSYS: ABNORMAL
DIFFERENTIAL METHOD: ABNORMAL
EOSINOPHIL # BLD AUTO: 0 K/UL
EOSINOPHIL NFR BLD: 0.1 %
EP: 5
ERYTHROCYTE [DISTWIDTH] IN BLOOD BY AUTOMATED COUNT: 18 %
ERYTHROCYTE [SEDIMENTATION RATE] IN BLOOD BY WESTERGREN METHOD: 12 MM/H
EST. GFR  (AFRICAN AMERICAN): >60 ML/MIN/1.73 M^2
EST. GFR  (NON AFRICAN AMERICAN): >60 ML/MIN/1.73 M^2
FIO2: 35
GLUCOSE SERPL-MCNC: 94 MG/DL
HCO3 UR-SCNC: 31 MMOL/L (ref 24–28)
HCT VFR BLD AUTO: 31.1 %
HGB BLD-MCNC: 9.7 G/DL
IMM GRANULOCYTES # BLD AUTO: 0.25 K/UL
IMM GRANULOCYTES NFR BLD AUTO: 2 %
IP: 10
LYMPHOCYTES # BLD AUTO: 2.1 K/UL
LYMPHOCYTES NFR BLD: 16.8 %
MAGNESIUM SERPL-MCNC: 2.3 MG/DL
MCH RBC QN AUTO: 29.5 PG
MCHC RBC AUTO-ENTMCNC: 31.2 G/DL
MCV RBC AUTO: 95 FL
MODE: ABNORMAL
MONOCYTES # BLD AUTO: 1.4 K/UL
MONOCYTES NFR BLD: 11.2 %
NEUTROPHILS # BLD AUTO: 8.6 K/UL
NEUTROPHILS NFR BLD: 69.8 %
NRBC BLD-RTO: 6 /100 WBC
PCO2 BLDA: 57.7 MMHG (ref 35–45)
PH SMN: 7.34 [PH] (ref 7.35–7.45)
PHOSPHATE SERPL-MCNC: 3.9 MG/DL
PLATELET # BLD AUTO: 158 K/UL
PMV BLD AUTO: 12.8 FL
PO2 BLDA: 121 MMHG (ref 80–100)
POC BE: 5 MMOL/L
POC SATURATED O2: 98 % (ref 95–100)
POC TCO2: 33 MMOL/L (ref 23–27)
POCT GLUCOSE: 100 MG/DL (ref 70–110)
POCT GLUCOSE: 114 MG/DL (ref 70–110)
POCT GLUCOSE: 91 MG/DL (ref 70–110)
POCT GLUCOSE: 91 MG/DL (ref 70–110)
POCT GLUCOSE: 96 MG/DL (ref 70–110)
POTASSIUM SERPL-SCNC: 3.5 MMOL/L
PROT SERPL-MCNC: 5.6 G/DL
RBC # BLD AUTO: 3.29 M/UL
SAMPLE: ABNORMAL
SITE: ABNORMAL
SODIUM SERPL-SCNC: 142 MMOL/L
WBC # BLD AUTO: 12.26 K/UL

## 2019-02-24 PROCEDURE — 25000003 PHARM REV CODE 250: Performed by: STUDENT IN AN ORGANIZED HEALTH CARE EDUCATION/TRAINING PROGRAM

## 2019-02-24 PROCEDURE — 85025 COMPLETE CBC W/AUTO DIFF WBC: CPT

## 2019-02-24 PROCEDURE — C9113 INJ PANTOPRAZOLE SODIUM, VIA: HCPCS | Performed by: STUDENT IN AN ORGANIZED HEALTH CARE EDUCATION/TRAINING PROGRAM

## 2019-02-24 PROCEDURE — 27000221 HC OXYGEN, UP TO 24 HOURS

## 2019-02-24 PROCEDURE — 63600175 PHARM REV CODE 636 W HCPCS: Performed by: STUDENT IN AN ORGANIZED HEALTH CARE EDUCATION/TRAINING PROGRAM

## 2019-02-24 PROCEDURE — 99900035 HC TECH TIME PER 15 MIN (STAT)

## 2019-02-24 PROCEDURE — 82140 ASSAY OF AMMONIA: CPT

## 2019-02-24 PROCEDURE — 83735 ASSAY OF MAGNESIUM: CPT

## 2019-02-24 PROCEDURE — 25000242 PHARM REV CODE 250 ALT 637 W/ HCPCS: Performed by: STUDENT IN AN ORGANIZED HEALTH CARE EDUCATION/TRAINING PROGRAM

## 2019-02-24 PROCEDURE — 82803 BLOOD GASES ANY COMBINATION: CPT

## 2019-02-24 PROCEDURE — 80053 COMPREHEN METABOLIC PANEL: CPT

## 2019-02-24 PROCEDURE — 99233 PR SUBSEQUENT HOSPITAL CARE,LEVL III: ICD-10-PCS | Mod: 24,,, | Performed by: SURGERY

## 2019-02-24 PROCEDURE — 25000003 PHARM REV CODE 250: Performed by: SURGERY

## 2019-02-24 PROCEDURE — 94640 AIRWAY INHALATION TREATMENT: CPT

## 2019-02-24 PROCEDURE — 20000000 HC ICU ROOM

## 2019-02-24 PROCEDURE — 83880 ASSAY OF NATRIURETIC PEPTIDE: CPT

## 2019-02-24 PROCEDURE — 25000003 PHARM REV CODE 250: Performed by: INTERNAL MEDICINE

## 2019-02-24 PROCEDURE — 94761 N-INVAS EAR/PLS OXIMETRY MLT: CPT

## 2019-02-24 PROCEDURE — 82330 ASSAY OF CALCIUM: CPT

## 2019-02-24 PROCEDURE — 94660 CPAP INITIATION&MGMT: CPT

## 2019-02-24 PROCEDURE — 99233 SBSQ HOSP IP/OBS HIGH 50: CPT | Mod: 24,,, | Performed by: SURGERY

## 2019-02-24 PROCEDURE — 37799 UNLISTED PX VASCULAR SURGERY: CPT

## 2019-02-24 PROCEDURE — 63600175 PHARM REV CODE 636 W HCPCS: Performed by: INTERNAL MEDICINE

## 2019-02-24 PROCEDURE — 84100 ASSAY OF PHOSPHORUS: CPT

## 2019-02-24 RX ORDER — FUROSEMIDE 20 MG/1
20 TABLET ORAL 2 TIMES DAILY
Status: DISCONTINUED | OUTPATIENT
Start: 2019-02-24 | End: 2019-02-24

## 2019-02-24 RX ORDER — MORPHINE SULFATE ORAL SOLUTION 10 MG/5ML
2.5 SOLUTION ORAL ONCE
Status: COMPLETED | OUTPATIENT
Start: 2019-02-24 | End: 2019-02-24

## 2019-02-24 RX ORDER — IPRATROPIUM BROMIDE AND ALBUTEROL SULFATE 2.5; .5 MG/3ML; MG/3ML
3 SOLUTION RESPIRATORY (INHALATION) EVERY 4 HOURS PRN
Status: DISCONTINUED | OUTPATIENT
Start: 2019-02-24 | End: 2019-03-16 | Stop reason: HOSPADM

## 2019-02-24 RX ORDER — FUROSEMIDE 10 MG/ML
40 INJECTION INTRAMUSCULAR; INTRAVENOUS 2 TIMES DAILY
Status: DISCONTINUED | OUTPATIENT
Start: 2019-02-24 | End: 2019-02-27

## 2019-02-24 RX ADMIN — HEPARIN SODIUM 5000 UNITS: 5000 INJECTION, SOLUTION INTRAVENOUS; SUBCUTANEOUS at 05:02

## 2019-02-24 RX ADMIN — MICONAZOLE NITRATE: 20 OINTMENT TOPICAL at 08:02

## 2019-02-24 RX ADMIN — LACTULOSE 20 G: 20 SOLUTION ORAL at 07:02

## 2019-02-24 RX ADMIN — CLOPIDOGREL 75 MG: 75 TABLET, FILM COATED ORAL at 08:02

## 2019-02-24 RX ADMIN — HEPARIN SODIUM 5000 UNITS: 5000 INJECTION, SOLUTION INTRAVENOUS; SUBCUTANEOUS at 12:02

## 2019-02-24 RX ADMIN — HYDROMORPHONE HYDROCHLORIDE 0.5 MG: 1 INJECTION, SOLUTION INTRAMUSCULAR; INTRAVENOUS; SUBCUTANEOUS at 05:02

## 2019-02-24 RX ADMIN — FLUCONAZOLE, SODIUM CHLORIDE 400 MG: 2 INJECTION INTRAVENOUS at 08:02

## 2019-02-24 RX ADMIN — FUROSEMIDE 40 MG: 10 INJECTION, SOLUTION INTRAVENOUS at 05:02

## 2019-02-24 RX ADMIN — PANTOPRAZOLE SODIUM 40 MG: 40 INJECTION, POWDER, FOR SOLUTION INTRAVENOUS at 07:02

## 2019-02-24 RX ADMIN — IPRATROPIUM BROMIDE AND ALBUTEROL SULFATE 3 ML: .5; 3 SOLUTION RESPIRATORY (INHALATION) at 08:02

## 2019-02-24 RX ADMIN — PIPERACILLIN AND TAZOBACTAM 4.5 G: 4; .5 INJECTION, POWDER, LYOPHILIZED, FOR SOLUTION INTRAVENOUS; PARENTERAL at 02:02

## 2019-02-24 RX ADMIN — FLUTICASONE FUROATE AND VILANTEROL TRIFENATATE 1 PUFF: 100; 25 POWDER RESPIRATORY (INHALATION) at 08:02

## 2019-02-24 RX ADMIN — POTASSIUM CHLORIDE 40 MEQ: 20 SOLUTION ORAL at 05:02

## 2019-02-24 RX ADMIN — MORPHINE SULFATE 2.5 MG: 10 SOLUTION ORAL at 12:02

## 2019-02-24 RX ADMIN — IPRATROPIUM BROMIDE AND ALBUTEROL SULFATE 3 ML: .5; 3 SOLUTION RESPIRATORY (INHALATION) at 12:02

## 2019-02-24 RX ADMIN — HYDROMORPHONE HYDROCHLORIDE 0.5 MG: 1 INJECTION, SOLUTION INTRAMUSCULAR; INTRAVENOUS; SUBCUTANEOUS at 10:02

## 2019-02-24 RX ADMIN — HYDROMORPHONE HYDROCHLORIDE 0.5 MG: 1 INJECTION, SOLUTION INTRAMUSCULAR; INTRAVENOUS; SUBCUTANEOUS at 01:02

## 2019-02-24 RX ADMIN — HEPARIN SODIUM 5000 UNITS: 5000 INJECTION, SOLUTION INTRAVENOUS; SUBCUTANEOUS at 09:02

## 2019-02-24 RX ADMIN — ASPIRIN 81 MG CHEWABLE TABLET 81 MG: 81 TABLET CHEWABLE at 08:02

## 2019-02-24 RX ADMIN — LACTULOSE 20 G: 20 SOLUTION ORAL at 08:02

## 2019-02-24 RX ADMIN — FUROSEMIDE 20 MG: 20 TABLET ORAL at 08:02

## 2019-02-24 RX ADMIN — IPRATROPIUM BROMIDE AND ALBUTEROL SULFATE 3 ML: .5; 3 SOLUTION RESPIRATORY (INHALATION) at 04:02

## 2019-02-24 NOTE — PLAN OF CARE
Reviewed BG levels and insulin regimen.      Goal BG while inpatient: 140-180 mg/dl  Current BG levels are at goal  IV steroid stop by primary, VS are stable  Tube feed started    Recommendations:   - Q4 POCT glucose  - Q4 low dose correction scale      Abnormal thyroid function test  TSH 4.718; 5 months ago was 1.563  Free T4 minimally low at .7 (nl range .71-1.51)  Likely 2/2 non-thyroidal illness (in the setting of severe illness) & not the cause of patient's current illness  Would not assess thyroid function in severe illness  Would repeat TFTs in 1-2 weeks once patient more stable      We will follow with you, call with any questions    En Gallagher MD  Endocrine Fellow  2/24/2019

## 2019-02-24 NOTE — PLAN OF CARE
Problem: Fall Injury Risk  Goal: Absence of Fall and Fall-Related Injury  Outcome: Ongoing (interventions implemented as appropriate)  .    Problem: Adult Inpatient Plan of Care  Goal: Plan of Care Review  Outcome: Ongoing (interventions implemented as appropriate)  VSS throughout shift. 3 L nasal cannula. ABG showed hypercapnia. Placed on BiPAP for a couple hours. Plan to wear BiPAP at night. Remains oriented x3. Continuously moans and screams. Denies pain. BMS drains appropriately. Urethral catheter drains appropriately. Urine output adequate throughout shift (50-90/hr). Accuchecks done q4. Required no insulin coverage. Wounds assessed and dressings changed per order. Turned q2 hours to prevent further skin breakdown. Plan of care reviewed with patient and daughter. Questions and concerns addressed. Will continue to monitor.     Problem: Diabetes Comorbidity  Goal: Blood Glucose Level Within Desired Range  Outcome: Ongoing (interventions implemented as appropriate)  .    Problem: Infection  Goal: Infection Symptom Resolution  Outcome: Ongoing (interventions implemented as appropriate)  .    Problem: Skin Injury Risk Increased  Goal: Skin Health and Integrity  Outcome: Ongoing (interventions implemented as appropriate)  .    Problem: Wound  Goal: Optimal Wound Healing  Outcome: Ongoing (interventions implemented as appropriate)  .    Problem: Pain Acute  Goal: Optimal Pain Control  Outcome: Ongoing (interventions implemented as appropriate)  .

## 2019-02-24 NOTE — SUBJECTIVE & OBJECTIVE
Interval History:   Still on bipap. Transferred to medicine team. Better oriented this morning, more responsive. Alk phos 2700 today.    Medications:  Continuous Infusions:    Scheduled Meds:   albuterol-ipratropium  3 mL Nebulization Q4H WAKE    aspirin  81 mg Oral Daily    clopidogrel  75 mg Oral Daily    fluconazole (DIFLUCAN) IVPB  400 mg Intravenous Q24H    fluticasone-vilanterol  1 puff Inhalation Daily    furosemide  20 mg Oral BID    heparin (porcine)  5,000 Units Subcutaneous Q8H    lactulose  20 g Per G Tube BID    miconazole nitrate 2%   Topical (Top) BID    pantoprozole (PROTONIX) IV  40 mg Intravenous Daily    senna-docusate 8.6-50 mg  1 tablet Per G Tube BID     PRN Meds:sodium chloride, albuterol-ipratropium, calcium gluconate IVPB, calcium gluconate IVPB, calcium gluconate IVPB, dextrose 50%, diclofenac sodium, diphenhydrAMINE, glucagon (human recombinant), HYDROmorphone, insulin aspart U-100, magnesium sulfate IVPB, magnesium sulfate IVPB, ondansetron, potassium chloride 10%, potassium chloride 10%, potassium chloride 10%, potassium, sodium phosphates, potassium, sodium phosphates, potassium, sodium phosphates, sodium chloride 0.9%     Review of patient's allergies indicates:   Allergen Reactions    Ace inhibitors Swelling    Carvedilol      Other reaction(s): Other (See Comments)  blister    Hydralazine analogues     Metformin Nausea And Vomiting    Tetracycline Itching    Tetracyclines Swelling    Travatan (with benzalkonium) [travoprost (benzalkonium)]     Travoprost Itching     Other reaction(s): Other (See Comments)  Blurry vision     Objective:     Vital Signs (Most Recent):  Temp: 98.4 °F (36.9 °C) (02/24/19 0800)  Pulse: 106 (02/24/19 0900)  Resp: 18 (02/24/19 0900)  BP: (!) 137/92 (02/24/19 0800)  SpO2: 100 % (02/24/19 0900) Vital Signs (24h Range):  Temp:  [97.4 °F (36.3 °C)-98.4 °F (36.9 °C)] 98.4 °F (36.9 °C)  Pulse:  [] 106  Resp:  [11-36] 18  SpO2:  [97 %-100  %] 100 %  BP: (115-185)/() 137/92  Arterial Line BP: (124-174)/(60-91) 159/79     Weight: 87.1 kg (192 lb)  Body mass index is 35.12 kg/m².    Intake/Output - Last 3 Shifts       02/22 0700 - 02/23 0659 02/23 0700 - 02/24 0659 02/24 0700 - 02/25 0659    I.V. (mL/kg) 2113 (24.3) 1195 (13.7)     Blood 600      NG/ 150 120    IV Piggyback 300      Total Intake(mL/kg) 3223 (37) 1345 (15.4) 120 (1.4)    Urine (mL/kg/hr) 833 (0.4) 1140 (0.5)     Stool 800 550     Total Output 1633 1690     Net +1590 -345 +120                 Physical Exam   Constitutional: She appears well-developed and well-nourished.   HENT:   Head: Normocephalic and atraumatic.   Cardiovascular: Normal rate and regular rhythm.   Pulmonary/Chest:   On bipap   Abdominal: Soft.   Soft, diffusely tender, midline firmness  Multiple areas of excoriation and skin breakdown noted to anterior abdomen   Musculoskeletal:   R hand and arm swollen    Neurological:   Not moaning in pain. Answers questions.   Skin: Skin is warm and dry.   Excoriations on abdomen and buttocks   Vitals reviewed.    Significant Labs:  CBC:   Recent Labs   Lab 02/24/19 0343   WBC 12.26   RBC 3.29*   HGB 9.7*   HCT 31.1*      MCV 95   MCH 29.5   MCHC 31.2*     BMP:   Recent Labs   Lab 02/24/19  0343   GLU 94      K 3.5      CO2 28   BUN 20   CREATININE 1.0   CALCIUM 8.8   MG 2.3     CMP:   Recent Labs   Lab 02/24/19  0343   GLU 94   CALCIUM 8.8   ALBUMIN 2.1*   PROT 5.6*      K 3.5   CO2 28      BUN 20   CREATININE 1.0   ALKPHOS 2,140*   ALT 47*   *   BILITOT 7.1*     LFTs:   Recent Labs   Lab 02/24/19  0343   ALT 47*   *   ALKPHOS 2,140*   BILITOT 7.1*   PROT 5.6*   ALBUMIN 2.1*     Coagulation:   Recent Labs   Lab 02/18/19  1208 02/19/19  1441   LABPROT 11.8 11.6   INR 1.2 1.1   APTT 32.3*  --      ABGs:   Recent Labs   Lab 02/24/19  0356   PH 7.339*   PCO2 57.7*   PO2 121*   HCO3 31.0*   POCSATURATED 98   BE 5   Hida negative  for acute cholecystitis.

## 2019-02-24 NOTE — PLAN OF CARE
Hospital Medicine Consult Team - [facility]    Requesting Physician for transfer to [facility] Medicine service /Team: Monster Laurent MD/General Surgery    Code Status: Full Code     Accepting Physician: Sharon Thakkar     Date of Request for transfer: 02/24/2019     Reason for request for transfer to medicine service: Further inpatient hospitalization for management of patient's medical problems.    Hospital Course: Patient is a 63 y.o. with chronic diastolic heart failure, COPD on home oxygen at 2 liters, chronic debility, CAD, Type 2 diabetes, HTN, previous CVA with residual right sided weakness, prior appendectomy in 8/2018 who was admitted to the hospital on 1/23/2019 to the Hospital Medicine Team E with nausea and vomiting and abdominal pain. Patient had CT scan of abdomen that revealed 4x3 cm rim enhancing fluid collection. General surgery consulted and on 1/24 patient noted to have elevated lactic acid of 6.8 and transferred to SICU to General surgery service. Patient was taken to OR on 1/25 and had ex-lap done and drainage of abscess. Patient post-op with severe sepsis requiring CRRT for severe metabolic acidosis. Patient remained intubated post-op and eventually extubated on 1/28. ID consulted an patient noted to have MRSA bacteremia and treated with 2 weeks of IV Vancomycin. Patient grew Bacteroides from surgical wound culture. ID recommended 4 weeks of Cipro and Flagyl and patient completed antibiotic treatment. Patient was stabilized and transferred to floor and was receiving PT/OT. ST consulted post-op and patient failed swallow evaluation and patient placed on TPN and eventually IR placed G tube for nutrition on 2/10. Patient off TPN and receiving enteral feeds via G tube. PT/OT has been working with patient and patient making very slow progress and [facility] was following patient while on floor to determine if she was candidate for OSNF as had chronic debility prior to this admit  when Rapid response was called on 2/19 after patient found very somnolent and difficult to arouse. Patient noted to have hypercapnic respiratory failure and placed on BiPAP and transferred back to SICU. Patient had infectious work-up to determine if cause of encephalopathy but only noted on work-up to have U/A with 39 WBC and mod. Bacteria and moderate yeast also started on Diflucan to treat. Patient also noted to have elevated ALP, AST, ALT and Hepatology consulted and felt related to infection or medication and not underlying liver disease. Ammonia was elevated but Hepatology did not feel there was evidence to suggest patient had hepatic encephalopathy and recommended not to treat. US of RUQ showed gallbladder sludge and thickening but no acute cholecystitis and prelim read on HIDA scan not consistent with acute cholecystitis and general surgery states unlikely gall bladder disease is cause of elevated LFT's. Patient after started on BiPAP is now much more alert and according to surgery team ready to stepdown to floor and Medicine has been requested to take over the care of this patient by the primary service for continued inpatient treatment and management of patient's multiple medical problems. Patient currently off BIPAP.    I assessed patient in SICU. Patient is awake and alert and mildly confused but responsive. Patient with edema in upper and lower extremities and BNP checked by myself and BNP > 4900 and after reviewing I+O's since admit patient is +16 liters. Patient with elevated CVP 15 on Echo from 2/22 consistent with volume opverload from heart failure. Elevated LFT's may be reflective of hepatic congestion related to heart failure. Patient has been on oral lasix 20 mg po BID but I discontinued after speaking with surgery team and started patient on IV Lasix 40 mg BID on 2/24.     Lines in place:  · Ritchie catheter  · Rectal tube  · Port-a cath in right chest wall  · G tube    To Do List:   · Continue IV  Lasix to diuresis patient to treat acute heart failure  · Monitor LFT's as patient is diuresised   · Continue TF via G tube  · Folllow-up urine culture as patient currently on Diflucan for suspected fungal UTI  · Reconsult PT/OT to re-evaluate patient as last recs prior to return to ICU on 2/19. Patient does not have [facility] SNF benefits and can only go to a hospital based SNF so [facility] following patient to determine if candidate [facility]. Patient wheelchair bound prior to this admit.   · Follow-up speech recs. Patient strict NPO and has G tube in place.    Anticipated Discharge Disposition: Skilled Nursing Facility    Medicine will accept patient for transfer to a hospital medicine service if patient is oput of SICU but patient not to be transferred to medicine team until tomorrow, 02/25/19 at 7:00 am only if out of SICU. I spoke with Dr. Sierra Real who is requesting transfer and informed her surgery is responsible for patient's care until tomorrow morning and only if out of ICU. Patient to be assigned to a medicine team by nocturnist tonight and to be temporarily placed on IMT list for reassignment in the morning if out of ICU.       Thank you and please call if you have any further questions.      CHARLIE MONTES MD  Attending Staff Physician   [facility]  Pager: [phone]  Spectralink: [phone]

## 2019-02-24 NOTE — SUBJECTIVE & OBJECTIVE
Interval History/Significant Events: On BiPAP QHS. Alert and oriented to person and place this AM.    Follow-up For: Procedure(s) (LRB):  LAPAROTOMY, EXPLORATORY (N/A)  INCISION AND DRAINAGE, ABSCESS-  drainage of pelvic abscess (N/A)  EXCISION, ABSCESS- drainage abdominal wall abscess (N/A)  APPLICATION, WOUND VAC (N/A)    Post-Operative Day: 30 Days Post-Op    Objective:     Vital Signs (Most Recent):  Temp: 98.4 °F (36.9 °C) (02/24/19 0800)  Pulse: 106 (02/24/19 0900)  Resp: 18 (02/24/19 0900)  BP: (!) 137/92 (02/24/19 0800)  SpO2: 100 % (02/24/19 0900) Vital Signs (24h Range):  Temp:  [97.4 °F (36.3 °C)-98.4 °F (36.9 °C)] 98.4 °F (36.9 °C)  Pulse:  [] 106  Resp:  [11-36] 18  SpO2:  [97 %-100 %] 100 %  BP: (115-185)/() 137/92  Arterial Line BP: (124-174)/(60-91) 159/79     Weight: 87.1 kg (192 lb)  Body mass index is 35.12 kg/m².      Intake/Output Summary (Last 24 hours) at 2/24/2019 0937  Last data filed at 2/24/2019 0500  Gross per 24 hour   Intake 1275 ml   Output 1470 ml   Net -195 ml       Physical Exam   Constitutional: She appears well-developed and well-nourished.   HENT:   Head: Normocephalic and atraumatic.   Eyes: Conjunctivae and EOM are normal. Pupils are equal, round, and reactive to light.   Neck: Normal range of motion. Neck supple.   Cardiovascular: Normal rate, regular rhythm, normal heart sounds and intact distal pulses.   Pulmonary/Chest: Effort normal and breath sounds normal.   On BiPAP 10/5   Abdominal: Soft. Bowel sounds are normal.   Soft, diffusely tender, midline firmness  Multiple areas of excoriation and skin breakdown noted to anterior abdomen   Musculoskeletal: Normal range of motion. She exhibits edema.   R hand and arm swollen    Neurological: She is alert.   Oriented to person and place.    Skin: Skin is warm and dry. Capillary refill takes less than 2 seconds.   Excoriations on abdomen and buttocks   Nursing note and vitals reviewed.      Vents:  Vent Mode: A/C  (01/29/19 0140)  Ventilator Initiated: Yes (01/26/19 0239)  Set Rate: 15 bmp (01/29/19 0140)  Vt Set: 450 mL (01/29/19 0140)  Pressure Support: 5 cmH20 (01/29/19 0140)  PEEP/CPAP: 5 cmH20 (01/28/19 1050)  Oxygen Concentration (%): 35 (02/24/19 0527)  Peak Airway Pressure: 0 cmH2O (01/29/19 0140)  Plateau Pressure: 0 cmH20 (01/29/19 0140)  Total Ve: 0 mL (01/29/19 0140)  F/VT Ratio<105 (RSBI): (!) 26.57 (01/28/19 1050)    Lines/Drains/Airways     Central Venous Catheter Line                 Port A Cath Single Lumen 02/17/19 0930 right subclavian 7 days          Drain                 Gastrostomy/Enterostomy 02/11/19 LUQ 13 days         Urethral Catheter 02/17/19 1507 Straight-tip 16 Fr. 6 days         Rectal Tube 02/20/19 1600 rectal tube w/ balloon (indicate number of mLs) 3 days          Arterial Line                 Arterial Line 02/20/19 2030 Right Radial 3 days          Peripheral Intravenous Line                 Midline Catheter Insertion/Assessment  - Single Lumen 02/21/19 1440 Left brachial vein 18g x 8cm 2 days                Significant Labs:    CBC/Anemia Profile:  Recent Labs   Lab 02/22/19  1020 02/23/19  0454 02/24/19  0343   WBC 21.42* 13.69* 12.26   HGB 10.6* 9.4* 9.7*   HCT 32.7* 31.0* 31.1*    160 158   MCV 92 97 95   RDW 16.3* 17.6* 18.0*        Chemistries:  Recent Labs   Lab 02/22/19  1020 02/23/19  0454 02/24/19  0343    140 142   K 5.6* 4.4 3.5    106 105   CO2 28 25 28   BUN 18 19 20   CREATININE 1.2 0.9 1.0   CALCIUM 9.8 8.6* 8.8   ALBUMIN  --  2.1* 2.1*   PROT  --  5.4* 5.6*   BILITOT  --  6.7* 7.1*   ALKPHOS  --  1,913* 2,140*   ALT  --  38 47*   AST  --  252* 233*   MG  --  2.3 2.3   PHOS  --  4.3 3.9           Significant Imaging:  I have reviewed all pertinent imaging results/findings within the past 24 hours.

## 2019-02-24 NOTE — PLAN OF CARE
Pt to be downgraded out of ICU. Much better day today than previous days. Was mostly coherent and not screaming nor encephalopathic. VSS. POC explained to the pt. Arterial line removed in anticipation of transfer out of the ICU. Will continue to monitor the pt.

## 2019-02-24 NOTE — PROGRESS NOTES
[facility]  Critical Care - Surgery  Progress Note    Patient Name: Sheryl Martines  MRN: [id number]  Admission Date: 1/23/2019  Hospital Length of Stay: 31 days  Code Status: Full Code  Attending Provider: Monster Laurent MD  Primary Care Provider: Primary Doctor No   Principal Problem: Somnolence    Subjective:     Hospital/ICU Course:  02/19/19: Arrived to the SICU on BiPAP after rapid response was called to patient's room (1007). At that time patient was minimally interactive.  02/20/19: Patient remains somnolent overnight and on BiPAP. Lactic acid peaked at 3.0, remains on broad spectrum Abx, UA dirty, Urine Cx sent, Blood Cxs pending.  02/24/19: On BiPAP QHS. Alert and oriented to person and place this AM. Stable for stepdown    Interval History/Significant Events: On BiPAP QHS. Alert and oriented to person and place this AM.    Follow-up For: Procedure(s) (LRB):  LAPAROTOMY, EXPLORATORY (N/A)  INCISION AND DRAINAGE, ABSCESS-  drainage of pelvic abscess (N/A)  EXCISION, ABSCESS- drainage abdominal wall abscess (N/A)  APPLICATION, WOUND VAC (N/A)    Post-Operative Day: 30 Days Post-Op    Objective:     Vital Signs (Most Recent):  Temp: 98.4 °F (36.9 °C) (02/24/19 0800)  Pulse: 106 (02/24/19 0900)  Resp: 18 (02/24/19 0900)  BP: (!) 137/92 (02/24/19 0800)  SpO2: 100 % (02/24/19 0900) Vital Signs (24h Range):  Temp:  [97.4 °F (36.3 °C)-98.4 °F (36.9 °C)] 98.4 °F (36.9 °C)  Pulse:  [] 106  Resp:  [11-36] 18  SpO2:  [97 %-100 %] 100 %  BP: (115-185)/() 137/92  Arterial Line BP: (124-174)/(60-91) 159/79     Weight: 87.1 kg (192 lb)  Body mass index is 35.12 kg/m².      Intake/Output Summary (Last 24 hours) at 2/24/2019 0937  Last data filed at 2/24/2019 0500  Gross per 24 hour   Intake 1275 ml   Output 1470 ml   Net -195 ml       Physical Exam   Constitutional: She appears well-developed and well-nourished.   HENT:   Head: Normocephalic and atraumatic.   Eyes: Conjunctivae and EOM  are normal. Pupils are equal, round, and reactive to light.   Neck: Normal range of motion. Neck supple.   Cardiovascular: Normal rate, regular rhythm, normal heart sounds and intact distal pulses.   Pulmonary/Chest: Effort normal and breath sounds normal.   On BiPAP 10/5   Abdominal: Soft. Bowel sounds are normal.   Soft, diffusely tender, midline firmness  Multiple areas of excoriation and skin breakdown noted to anterior abdomen   Musculoskeletal: Normal range of motion. She exhibits edema.   R hand and arm swollen    Neurological: She is alert.   Oriented to person and place.    Skin: Skin is warm and dry. Capillary refill takes less than 2 seconds.   Excoriations on abdomen and buttocks   Nursing note and vitals reviewed.      Vents:  Vent Mode: A/C (01/29/19 0140)  Ventilator Initiated: Yes (01/26/19 0239)  Set Rate: 15 bmp (01/29/19 0140)  Vt Set: 450 mL (01/29/19 0140)  Pressure Support: 5 cmH20 (01/29/19 0140)  PEEP/CPAP: 5 cmH20 (01/28/19 1050)  Oxygen Concentration (%): 35 (02/24/19 0527)  Peak Airway Pressure: 0 cmH2O (01/29/19 0140)  Plateau Pressure: 0 cmH20 (01/29/19 0140)  Total Ve: 0 mL (01/29/19 0140)  F/VT Ratio<105 (RSBI): (!) 26.57 (01/28/19 1050)    Lines/Drains/Airways     Central Venous Catheter Line                 Port A Cath Single Lumen 02/17/19 0930 right subclavian 7 days          Drain                 Gastrostomy/Enterostomy 02/11/19 LUQ 13 days         Urethral Catheter 02/17/19 1507 Straight-tip 16 Fr. 6 days         Rectal Tube 02/20/19 1600 rectal tube w/ balloon (indicate number of mLs) 3 days          Arterial Line                 Arterial Line 02/20/19 2030 Right Radial 3 days          Peripheral Intravenous Line                 Midline Catheter Insertion/Assessment  - Single Lumen 02/21/19 1440 Left brachial vein 18g x 8cm 2 days                Significant Labs:    CBC/Anemia Profile:  Recent Labs   Lab 02/22/19  1020 02/23/19  0454 02/24/19  0343   WBC 21.42* 13.69* 12.26    HGB 10.6* 9.4* 9.7*   HCT 32.7* 31.0* 31.1*    160 158   MCV 92 97 95   RDW 16.3* 17.6* 18.0*        Chemistries:  Recent Labs   Lab 02/22/19  1020 02/23/19  0454 02/24/19  0343    140 142   K 5.6* 4.4 3.5    106 105   CO2 28 25 28   BUN 18 19 20   CREATININE 1.2 0.9 1.0   CALCIUM 9.8 8.6* 8.8   ALBUMIN  --  2.1* 2.1*   PROT  --  5.4* 5.6*   BILITOT  --  6.7* 7.1*   ALKPHOS  --  1,913* 2,140*   ALT  --  38 47*   AST  --  252* 233*   MG  --  2.3 2.3   PHOS  --  4.3 3.9           Significant Imaging:  I have reviewed all pertinent imaging results/findings within the past 24 hours.    Assessment/Plan:     * Somnolence    63 y.o. female w/ a significant PMHx of COPD, CAD, T2DM, CKD, HTN, CVA w/ residual right sided deficits, significant debility, and recent exploratory laparotomy for pelvis mass/abscess that required an ICU admission postoperatively. Patient stepped back up to the SICU 02/19/19 s/p rapid response for somnolence and altered mental status.    Neuro:   Alert and oriented to person and place. Last CT head negative. No focal neurological deficits. Unclear etiology. Ammonia stable in 50s. Altered mental status likely related to hypercapnia as it improved with BiPAP  - Continue lactulose BID  - Trend ammonia  - PRN pain medication IV    Pulmonary:   Patient w/ a Hx of hypercapnic respiratory failure on this admission requiring intubation. Now on nasal canula.   - BiPAP QHS  - Conitnue home Breo  - Duonebs, IS, acapella  - CXR/ABG PRN      Cardiac:   - HDS of pressors.   - Continuing home meds    Renal:    Questionable Hx of CKD with urine output increased. U/A dirty with yeast in culture. Blood cultures have been negative.   - Continue antifungals  - Monitor urine output with strict I/O   - Trend BUN/Cr    ID:   Afebrile over last 24H with no Leukocytosis  - Questionable RUL opacification in lungs.    - UCx grew candida  - Blood Cx NGTD  - Continue vanc/zosyn/fluconazole  - Trend WBC w/  daily CBCs    Hem/Onc:   - Stable  -Trend CBC  - Transfuse for Hgb < 7.0 g/dL    Endocrine:    Hx of T2DM. Endocrine consulted, appreciate assistance. Elevated TSH with low T4  - Accuchecks Q6H as patient NPO  - Low dose SSI Q6H    Fluids/Electrolytes/Nutrition/GI:   Patient with significantly elevated Alk phos and GGT with US that shows thickened wall. Concern for possible acalculous cholecystitis although etiology remains unclear. HIDA appears negative.  - Follow up official read from HIDA.   - Replace electrolytes PRN  - TF trickle today. Still remains aspiration risk.     PPx:  - DVT: heparin 5000u subq Q8H  - Bowel: lactulose for 3-4 BMs/day  - PUP: Protonix 40 mg daily  - VAP: N/A    DISPO:  - Stable for stepdown  - Follow up HIDA read.               Critical care was time spent personally by me on the following activities: development of treatment plan with patient or surrogate and bedside caregivers, discussions with consultants, evaluation of patient's response to treatment, examination of patient, ordering and performing treatments and interventions, ordering and review of laboratory studies, ordering and review of radiographic studies, pulse oximetry, re-evaluation of patient's condition.  This critical care time did not overlap with that of any other provider or involve time for any procedures.     Shola Stephens MD  Critical Care - Surgery  [facility]

## 2019-02-24 NOTE — PLAN OF CARE
Problem: Adult Inpatient Plan of Care  Goal: Plan of Care Review  Outcome: Ongoing (interventions implemented as appropriate)  VSS throughout shift. Pt tolerated BIPAP all night. Remains oriented x3, not oriented to time. Afebrile, NSR, sats >98% throughout shift.  MAP goal >65 and SBP <180 maintained. Continuously moans and screams. Pt complains of pain, PRN dilaudid given q4.  BMS drains appropriately, 250cc output. UO adequate 30-40cc/hr. Accuchecks done q4, no coverage needed. Incision open to air, CDI. Turned q2 hours to prevent further skin breakdown. MD updated on assessments, labs, etc. Plan of care reviewed. Questions and concerns addressed. Will continue to monitor.        Problem: Diabetes Comorbidity  Goal: Blood Glucose Level Within Desired Range  Outcome: Ongoing (interventions implemented as appropriate)  .    Problem: Infection  Goal: Infection Symptom Resolution  Outcome: Ongoing (interventions implemented as appropriate)  .    Problem: Skin Injury Risk Increased  Goal: Skin Health and Integrity  Outcome: Ongoing (interventions implemented as appropriate)  .    Problem: Wound  Goal: Optimal Wound Healing  Outcome: Ongoing (interventions implemented as appropriate)  .    Problem: Pain Acute  Goal: Optimal Pain Control  Outcome: Ongoing (interventions implemented as appropriate)  .

## 2019-02-24 NOTE — ASSESSMENT & PLAN NOTE
63 y.o. female w/ a significant PMHx of COPD, CAD, T2DM, CKD, HTN, CVA w/ residual right sided deficits, significant debility, and recent exploratory laparotomy for pelvis mass/abscess that required an ICU admission postoperatively. Patient stepped back up to the SICU 02/19/19 s/p rapid response for somnolence and altered mental status.    Neuro:   Alert and oriented to person and place. Last CT head negative. No focal neurological deficits. Unclear etiology. Ammonia stable in 50s. Altered mental status likely related to hypercapnia as it improved with BiPAP  - Continue lactulose BID  - Trend ammonia  - PRN pain medication IV    Pulmonary:   Patient w/ a Hx of hypercapnic respiratory failure on this admission requiring intubation. Now on nasal canula.   - BiPAP QHS  - Conitnue home Breo  - Duonebs, IS, acapella  - CXR/ABG PRN      Cardiac:   - HDS of pressors.   - Continuing home meds    Renal:    Questionable Hx of CKD with urine output increased. U/A dirty with yeast in culture. Blood cultures have been negative.   - Continue antifungals  - Monitor urine output with strict I/O   - Trend BUN/Cr    ID:   Afebrile over last 24H with no Leukocytosis  - Questionable RUL opacification in lungs.    - UCx grew candida  - Blood Cx NGTD  - Continue vanc/zosyn/fluconazole  - Trend WBC w/ daily CBCs    Hem/Onc:   - Stable  -Trend CBC  - Transfuse for Hgb < 7.0 g/dL    Endocrine:    Hx of T2DM. Endocrine consulted, appreciate assistance. Elevated TSH with low T4  - Accuchecks Q6H as patient NPO  - Low dose SSI Q6H    Fluids/Electrolytes/Nutrition/GI:   Patient with significantly elevated Alk phos and GGT with US that shows thickened wall. Concern for possible acalculous cholecystitis although etiology remains unclear. HIDA appears negative.  - Follow up official read from HIDA.   - Replace electrolytes PRN  - TF trickle today. Still remains aspiration risk.     PPx:  - DVT: heparin 5000u subq Q8H  - Bowel: lactulose for 3-4  BMs/day  - PUP: Protonix 40 mg daily  - VAP: N/A    DISPO:  - Stable for stepdown  - Follow up NARCISO colon.

## 2019-02-25 PROBLEM — T38.0X5A ADVERSE EFFECT OF ADRENAL CORTICAL STEROIDS: Status: RESOLVED | Noted: 2019-01-28 | Resolved: 2019-02-25

## 2019-02-25 PROBLEM — D64.89 OTHER SPECIFIED ANEMIAS: Status: ACTIVE | Noted: 2019-02-25

## 2019-02-25 PROBLEM — E87.6 HYPOKALEMIA: Status: ACTIVE | Noted: 2019-02-25

## 2019-02-25 PROBLEM — R13.10 DYSPHAGIA: Status: ACTIVE | Noted: 2019-01-30

## 2019-02-25 LAB
ALBUMIN SERPL BCP-MCNC: 1.9 G/DL
ALP SERPL-CCNC: 2766 U/L
ALT SERPL W/O P-5'-P-CCNC: 74 U/L
ANION GAP SERPL CALC-SCNC: 8 MMOL/L
AST SERPL-CCNC: 467 U/L
BASOPHILS # BLD AUTO: 0.02 K/UL
BASOPHILS NFR BLD: 0.2 %
BILIRUB SERPL-MCNC: 8.8 MG/DL
BUN SERPL-MCNC: 19 MG/DL
CA-I BLDV-SCNC: 1.1 MMOL/L
CALCIUM SERPL-MCNC: 8.8 MG/DL
CHLORIDE SERPL-SCNC: 104 MMOL/L
CO2 SERPL-SCNC: 33 MMOL/L
CREAT SERPL-MCNC: 1 MG/DL
DIFFERENTIAL METHOD: ABNORMAL
EOSINOPHIL # BLD AUTO: 0.2 K/UL
EOSINOPHIL NFR BLD: 1.4 %
ERYTHROCYTE [DISTWIDTH] IN BLOOD BY AUTOMATED COUNT: 19 %
EST. GFR  (AFRICAN AMERICAN): >60 ML/MIN/1.73 M^2
EST. GFR  (NON AFRICAN AMERICAN): >60 ML/MIN/1.73 M^2
GLUCOSE SERPL-MCNC: 86 MG/DL
HCT VFR BLD AUTO: 33.4 %
HGB BLD-MCNC: 10.1 G/DL
IMM GRANULOCYTES # BLD AUTO: 0.23 K/UL
IMM GRANULOCYTES NFR BLD AUTO: 2.1 %
LYMPHOCYTES # BLD AUTO: 2.7 K/UL
LYMPHOCYTES NFR BLD: 24 %
MAGNESIUM SERPL-MCNC: 1.9 MG/DL
MCH RBC QN AUTO: 29.7 PG
MCHC RBC AUTO-ENTMCNC: 30.2 G/DL
MCV RBC AUTO: 98 FL
MONOCYTES # BLD AUTO: 1.5 K/UL
MONOCYTES NFR BLD: 13.5 %
NEUTROPHILS # BLD AUTO: 6.5 K/UL
NEUTROPHILS NFR BLD: 58.8 %
NRBC BLD-RTO: 13 /100 WBC
PHOSPHATE SERPL-MCNC: 2.9 MG/DL
PLATELET # BLD AUTO: 188 K/UL
PMV BLD AUTO: 11.9 FL
POCT GLUCOSE: 100 MG/DL (ref 70–110)
POCT GLUCOSE: 104 MG/DL (ref 70–110)
POCT GLUCOSE: 106 MG/DL (ref 70–110)
POCT GLUCOSE: 95 MG/DL (ref 70–110)
POTASSIUM SERPL-SCNC: 3.2 MMOL/L
PREALB SERPL-MCNC: 8 MG/DL
PROT SERPL-MCNC: 5.5 G/DL
RBC # BLD AUTO: 3.4 M/UL
SODIUM SERPL-SCNC: 145 MMOL/L
WBC # BLD AUTO: 11.04 K/UL

## 2019-02-25 PROCEDURE — 20600001 HC STEP DOWN PRIVATE ROOM

## 2019-02-25 PROCEDURE — C9113 INJ PANTOPRAZOLE SODIUM, VIA: HCPCS | Performed by: STUDENT IN AN ORGANIZED HEALTH CARE EDUCATION/TRAINING PROGRAM

## 2019-02-25 PROCEDURE — 94761 N-INVAS EAR/PLS OXIMETRY MLT: CPT

## 2019-02-25 PROCEDURE — 83735 ASSAY OF MAGNESIUM: CPT

## 2019-02-25 PROCEDURE — 25000003 PHARM REV CODE 250: Performed by: HOSPITALIST

## 2019-02-25 PROCEDURE — 85025 COMPLETE CBC W/AUTO DIFF WBC: CPT

## 2019-02-25 PROCEDURE — 63600175 PHARM REV CODE 636 W HCPCS: Performed by: SURGERY

## 2019-02-25 PROCEDURE — 25000242 PHARM REV CODE 250 ALT 637 W/ HCPCS: Performed by: STUDENT IN AN ORGANIZED HEALTH CARE EDUCATION/TRAINING PROGRAM

## 2019-02-25 PROCEDURE — 63600175 PHARM REV CODE 636 W HCPCS: Performed by: STUDENT IN AN ORGANIZED HEALTH CARE EDUCATION/TRAINING PROGRAM

## 2019-02-25 PROCEDURE — 84100 ASSAY OF PHOSPHORUS: CPT

## 2019-02-25 PROCEDURE — 94640 AIRWAY INHALATION TREATMENT: CPT

## 2019-02-25 PROCEDURE — 63600175 PHARM REV CODE 636 W HCPCS: Performed by: INTERNAL MEDICINE

## 2019-02-25 PROCEDURE — 25000003 PHARM REV CODE 250: Performed by: STUDENT IN AN ORGANIZED HEALTH CARE EDUCATION/TRAINING PROGRAM

## 2019-02-25 PROCEDURE — 80053 COMPREHEN METABOLIC PANEL: CPT

## 2019-02-25 PROCEDURE — 82330 ASSAY OF CALCIUM: CPT

## 2019-02-25 PROCEDURE — 25000003 PHARM REV CODE 250: Performed by: SURGERY

## 2019-02-25 PROCEDURE — 97530 THERAPEUTIC ACTIVITIES: CPT

## 2019-02-25 PROCEDURE — 99900035 HC TECH TIME PER 15 MIN (STAT)

## 2019-02-25 PROCEDURE — 27000221 HC OXYGEN, UP TO 24 HOURS

## 2019-02-25 PROCEDURE — 84134 ASSAY OF PREALBUMIN: CPT

## 2019-02-25 PROCEDURE — 94664 DEMO&/EVAL PT USE INHALER: CPT

## 2019-02-25 RX ORDER — IPRATROPIUM BROMIDE AND ALBUTEROL SULFATE 2.5; .5 MG/3ML; MG/3ML
3 SOLUTION RESPIRATORY (INHALATION)
Status: DISCONTINUED | OUTPATIENT
Start: 2019-02-26 | End: 2019-03-09

## 2019-02-25 RX ORDER — CIPROFLOXACIN 500 MG/1
500 TABLET ORAL EVERY 12 HOURS
Status: DISCONTINUED | OUTPATIENT
Start: 2019-02-25 | End: 2019-02-26

## 2019-02-25 RX ORDER — CIPROFLOXACIN 500 MG/1
500 TABLET ORAL EVERY 12 HOURS
Status: DISCONTINUED | OUTPATIENT
Start: 2019-02-25 | End: 2019-02-25

## 2019-02-25 RX ORDER — METRONIDAZOLE 500 MG/1
500 TABLET ORAL EVERY 8 HOURS
Status: DISCONTINUED | OUTPATIENT
Start: 2019-02-25 | End: 2019-02-26

## 2019-02-25 RX ORDER — PANTOPRAZOLE SODIUM 40 MG/1
40 FOR SUSPENSION ORAL DAILY
Status: DISCONTINUED | OUTPATIENT
Start: 2019-02-25 | End: 2019-02-25

## 2019-02-25 RX ORDER — NAPROXEN SODIUM 220 MG/1
81 TABLET, FILM COATED ORAL DAILY
Status: DISCONTINUED | OUTPATIENT
Start: 2019-02-26 | End: 2019-03-16

## 2019-02-25 RX ORDER — PANTOPRAZOLE SODIUM 40 MG/1
40 FOR SUSPENSION ORAL DAILY
Status: DISCONTINUED | OUTPATIENT
Start: 2019-02-26 | End: 2019-03-16

## 2019-02-25 RX ORDER — CLOPIDOGREL BISULFATE 75 MG/1
75 TABLET ORAL DAILY
Status: DISCONTINUED | OUTPATIENT
Start: 2019-02-26 | End: 2019-03-16

## 2019-02-25 RX ADMIN — LACTULOSE 20 G: 20 SOLUTION ORAL at 08:02

## 2019-02-25 RX ADMIN — HYDROMORPHONE HYDROCHLORIDE 0.5 MG: 1 INJECTION, SOLUTION INTRAMUSCULAR; INTRAVENOUS; SUBCUTANEOUS at 03:02

## 2019-02-25 RX ADMIN — LACTULOSE 20 G: 20 SOLUTION ORAL at 09:02

## 2019-02-25 RX ADMIN — FLUCONAZOLE, SODIUM CHLORIDE 400 MG: 2 INJECTION INTRAVENOUS at 09:02

## 2019-02-25 RX ADMIN — ONDANSETRON 4 MG: 2 INJECTION INTRAMUSCULAR; INTRAVENOUS at 10:02

## 2019-02-25 RX ADMIN — IPRATROPIUM BROMIDE AND ALBUTEROL SULFATE 3 ML: .5; 3 SOLUTION RESPIRATORY (INHALATION) at 07:02

## 2019-02-25 RX ADMIN — PANTOPRAZOLE SODIUM 40 MG: 40 INJECTION, POWDER, FOR SOLUTION INTRAVENOUS at 08:02

## 2019-02-25 RX ADMIN — IPRATROPIUM BROMIDE AND ALBUTEROL SULFATE 3 ML: .5; 3 SOLUTION RESPIRATORY (INHALATION) at 03:02

## 2019-02-25 RX ADMIN — CIPROFLOXACIN HYDROCHLORIDE 500 MG: 500 TABLET, FILM COATED ORAL at 09:02

## 2019-02-25 RX ADMIN — HEPARIN SODIUM 5000 UNITS: 5000 INJECTION, SOLUTION INTRAVENOUS; SUBCUTANEOUS at 09:02

## 2019-02-25 RX ADMIN — FUROSEMIDE 40 MG: 10 INJECTION, SOLUTION INTRAVENOUS at 10:02

## 2019-02-25 RX ADMIN — FLUTICASONE FUROATE AND VILANTEROL TRIFENATATE 1 PUFF: 100; 25 POWDER RESPIRATORY (INHALATION) at 09:02

## 2019-02-25 RX ADMIN — HYDROMORPHONE HYDROCHLORIDE 0.5 MG: 1 INJECTION, SOLUTION INTRAMUSCULAR; INTRAVENOUS; SUBCUTANEOUS at 09:02

## 2019-02-25 RX ADMIN — STANDARDIZED SENNA CONCENTRATE AND DOCUSATE SODIUM 1 TABLET: 8.6; 5 TABLET, FILM COATED ORAL at 08:02

## 2019-02-25 RX ADMIN — ASPIRIN 81 MG CHEWABLE TABLET 81 MG: 81 TABLET CHEWABLE at 08:02

## 2019-02-25 RX ADMIN — POTASSIUM CHLORIDE 40 MEQ: 20 SOLUTION ORAL at 08:02

## 2019-02-25 RX ADMIN — HEPARIN SODIUM 5000 UNITS: 5000 INJECTION, SOLUTION INTRAVENOUS; SUBCUTANEOUS at 05:02

## 2019-02-25 RX ADMIN — METRONIDAZOLE 500 MG: 500 TABLET ORAL at 09:02

## 2019-02-25 RX ADMIN — POTASSIUM CHLORIDE 40 MEQ: 20 SOLUTION ORAL at 05:02

## 2019-02-25 RX ADMIN — FUROSEMIDE 20 MG: 10 INJECTION, SOLUTION INTRAVENOUS at 08:02

## 2019-02-25 RX ADMIN — IPRATROPIUM BROMIDE AND ALBUTEROL SULFATE 3 ML: .5; 3 SOLUTION RESPIRATORY (INHALATION) at 10:02

## 2019-02-25 RX ADMIN — CLOPIDOGREL 75 MG: 75 TABLET, FILM COATED ORAL at 08:02

## 2019-02-25 RX ADMIN — STANDARDIZED SENNA CONCENTRATE AND DOCUSATE SODIUM 1 TABLET: 8.6; 5 TABLET, FILM COATED ORAL at 09:02

## 2019-02-25 RX ADMIN — MICONAZOLE NITRATE: 20 OINTMENT TOPICAL at 09:02

## 2019-02-25 NOTE — SUBJECTIVE & OBJECTIVE
Interval History/Significant Events:   NAEON. Not currently oriented to time. Remains HDS without vasopressor support.  Pt tolerated BiPAP without issue.  Net negative 2.1L. Pain adequately managed with diluadid PRN.    Follow-up For: Procedure(s) (LRB):  LAPAROTOMY, EXPLORATORY (N/A)  INCISION AND DRAINAGE, ABSCESS-  drainage of pelvic abscess (N/A)  EXCISION, ABSCESS- drainage abdominal wall abscess (N/A)  APPLICATION, WOUND VAC (N/A)    Post-Operative Day: 31 Days Post-Op    Objective:     Vital Signs (Most Recent):  Temp: 98 °F (36.7 °C) (02/25/19 0300)  Pulse: 96 (02/25/19 0615)  Resp: 16 (02/25/19 0615)  BP: 123/72 (02/25/19 0600)  SpO2: 100 % (02/25/19 0615) Vital Signs (24h Range):  Temp:  [97.7 °F (36.5 °C)-98.4 °F (36.9 °C)] 98 °F (36.7 °C)  Pulse:  [] 96  Resp:  [13-44] 16  SpO2:  [88 %-100 %] 100 %  BP: (115-157)/(69-96) 123/72  Arterial Line BP: (141-178)/(72-92) 178/92     Weight: 87.1 kg (192 lb)  Body mass index is 35.12 kg/m².      Intake/Output Summary (Last 24 hours) at 2/25/2019 0653  Last data filed at 2/25/2019 0600  Gross per 24 hour   Intake 1164 ml   Output 3470 ml   Net -2306 ml       Physical Exam   Constitutional: She appears well-developed. No distress.   HENT:   Head: Normocephalic and atraumatic.   On BiPAP   Eyes: EOM are normal. Pupils are equal, round, and reactive to light.   Neck: Neck supple.   Cardiovascular: Normal rate, regular rhythm and intact distal pulses.   Pulmonary/Chest: Breath sounds normal. No respiratory distress.   Abdominal: Soft. Bowel sounds are normal. She exhibits no distension. There is no tenderness.   Neurological: She is alert.   Not oriented to time   Skin: Skin is warm and dry. She is not diaphoretic.       Vents:  Vent Mode: A/C (01/29/19 0140)  Ventilator Initiated: Yes (01/26/19 0239)  Set Rate: 15 bmp (01/29/19 0140)  Vt Set: 450 mL (01/29/19 0140)  Pressure Support: 5 cmH20 (01/29/19 0140)  PEEP/CPAP: 5 cmH20 (01/28/19 1050)  Oxygen  Concentration (%): 35 (02/25/19 0600)  Peak Airway Pressure: 0 cmH2O (01/29/19 0140)  Plateau Pressure: 0 cmH20 (01/29/19 0140)  Total Ve: 0 mL (01/29/19 0140)  F/VT Ratio<105 (RSBI): (!) 26.57 (01/28/19 1050)    Lines/Drains/Airways     Central Venous Catheter Line                 Port A Cath Single Lumen 02/17/19 0930 right subclavian 7 days          Drain                 Gastrostomy/Enterostomy 02/11/19 LUQ 14 days         Urethral Catheter 02/17/19 1507 Straight-tip 16 Fr. 7 days         Rectal Tube 02/20/19 1600 rectal tube w/ balloon (indicate number of mLs) 4 days          Peripheral Intravenous Line                 Midline Catheter Insertion/Assessment  - Single Lumen 02/21/19 1440 Left brachial vein 18g x 8cm 3 days                Significant Labs:    CBC/Anemia Profile:  Recent Labs   Lab 02/24/19  0343 02/25/19  0353   WBC 12.26 11.04   HGB 9.7* 10.1*   HCT 31.1* 33.4*    188   MCV 95 98   RDW 18.0* 19.0*        Chemistries:  Recent Labs   Lab 02/24/19  0343 02/25/19  0353    145   K 3.5 3.2*    104   CO2 28 33*   BUN 20 19   CREATININE 1.0 1.0   CALCIUM 8.8 8.8   ALBUMIN 2.1* 1.9*   PROT 5.6* 5.5*   BILITOT 7.1* 8.8*   ALKPHOS 2,140* 2,766*   ALT 47* 74*   * 467*   MG 2.3 1.9   PHOS 3.9 2.9       Significant Imaging:  I have reviewed all images from the past 24 hours.

## 2019-02-25 NOTE — PROGRESS NOTES
[facility]  Critical Care - Surgery  Progress Note    Patient Name: Sheryl Martines  MRN: [id number]  Admission Date: 1/23/2019  Hospital Length of Stay: 32 days  Code Status: Full Code  Attending Provider: Froylan Rajput MD  Primary Care Provider: Primary Doctor No   Principal Problem: Somnolence    Subjective:     Hospital/ICU Course:  02/19/19: Arrived to the SICU on BiPAP after rapid response was called to patient's room (1007). At that time patient was minimally interactive.  02/20/19: Patient remains somnolent overnight and on BiPAP. Lactic acid peaked at 3.0, remains on broad spectrum Abx, UA dirty, Urine Cx sent, Blood Cxs pending.  02/24/19: On BiPAP QHS. Alert and oriented to person and place this AM. Stable for stepdown    Interval History/Significant Events:   NAEON. Not currently oriented to time. Remains HDS without vasopressor support.  Pt tolerated BiPAP without issue.  Net negative 2.1L. Pain adequately managed with diluadid PRN.    Follow-up For: Procedure(s) (LRB):  LAPAROTOMY, EXPLORATORY (N/A)  INCISION AND DRAINAGE, ABSCESS-  drainage of pelvic abscess (N/A)  EXCISION, ABSCESS- drainage abdominal wall abscess (N/A)  APPLICATION, WOUND VAC (N/A)    Post-Operative Day: 31 Days Post-Op    Objective:     Vital Signs (Most Recent):  Temp: 98 °F (36.7 °C) (02/25/19 0300)  Pulse: 96 (02/25/19 0615)  Resp: 16 (02/25/19 0615)  BP: 123/72 (02/25/19 0600)  SpO2: 100 % (02/25/19 0615) Vital Signs (24h Range):  Temp:  [97.7 °F (36.5 °C)-98.4 °F (36.9 °C)] 98 °F (36.7 °C)  Pulse:  [] 96  Resp:  [13-44] 16  SpO2:  [88 %-100 %] 100 %  BP: (115-157)/(69-96) 123/72  Arterial Line BP: (141-178)/(72-92) 178/92     Weight: 87.1 kg (192 lb)  Body mass index is 35.12 kg/m².      Intake/Output Summary (Last 24 hours) at 2/25/2019 0653  Last data filed at 2/25/2019 0600  Gross per 24 hour   Intake 1164 ml   Output 3470 ml   Net -2306 ml       Physical Exam   Constitutional: She appears  well-developed. No distress.   HENT:   Head: Normocephalic and atraumatic.   On BiPAP   Eyes: EOM are normal. Pupils are equal, round, and reactive to light.   Neck: Neck supple.   Cardiovascular: Normal rate, regular rhythm and intact distal pulses.   Pulmonary/Chest: Breath sounds normal. No respiratory distress.   Abdominal: Soft. Bowel sounds are normal. She exhibits no distension. There is no tenderness.   Neurological: She is alert.   Not oriented to time   Skin: Skin is warm and dry. She is not diaphoretic.       Vents:  Vent Mode: A/C (01/29/19 0140)  Ventilator Initiated: Yes (01/26/19 0239)  Set Rate: 15 bmp (01/29/19 0140)  Vt Set: 450 mL (01/29/19 0140)  Pressure Support: 5 cmH20 (01/29/19 0140)  PEEP/CPAP: 5 cmH20 (01/28/19 1050)  Oxygen Concentration (%): 35 (02/25/19 0600)  Peak Airway Pressure: 0 cmH2O (01/29/19 0140)  Plateau Pressure: 0 cmH20 (01/29/19 0140)  Total Ve: 0 mL (01/29/19 0140)  F/VT Ratio<105 (RSBI): (!) 26.57 (01/28/19 1050)    Lines/Drains/Airways     Central Venous Catheter Line                 Port A Cath Single Lumen 02/17/19 0930 right subclavian 7 days          Drain                 Gastrostomy/Enterostomy 02/11/19 LUQ 14 days         Urethral Catheter 02/17/19 1507 Straight-tip 16 Fr. 7 days         Rectal Tube 02/20/19 1600 rectal tube w/ balloon (indicate number of mLs) 4 days          Peripheral Intravenous Line                 Midline Catheter Insertion/Assessment  - Single Lumen 02/21/19 1440 Left brachial vein 18g x 8cm 3 days                Significant Labs:    CBC/Anemia Profile:  Recent Labs   Lab 02/24/19  0343 02/25/19  0353   WBC 12.26 11.04   HGB 9.7* 10.1*   HCT 31.1* 33.4*    188   MCV 95 98   RDW 18.0* 19.0*        Chemistries:  Recent Labs   Lab 02/24/19  0343 02/25/19  0353    145   K 3.5 3.2*    104   CO2 28 33*   BUN 20 19   CREATININE 1.0 1.0   CALCIUM 8.8 8.8   ALBUMIN 2.1* 1.9*   PROT 5.6* 5.5*   BILITOT 7.1* 8.8*   ALKPHOS 2,140*  2,766*   ALT 47* 74*   * 467*   MG 2.3 1.9   PHOS 3.9 2.9       Significant Imaging:  I have reviewed all images from the past 24 hours.    Assessment/Plan:     * Somnolence    63 y.o. female w/ a significant PMHx of COPD, CAD, T2DM, CKD, HTN, CVA w/ residual right sided deficits, significant debility, and recent exploratory laparotomy for pelvis mass/abscess that required an ICU admission postoperatively. Patient stepped back up to the SICU 02/19/19 s/p rapid response for somnolence and altered mental status.    Neuro:   Alert and oriented to person and place. Last CT head negative. No focal neurological deficits. Unclear etiology. Ammonia stable in 50s. Altered mental status likely related to hypercapnia as it improved with BiPAP  - Continue lactulose BID  - PRN dilaudid IV    Pulmonary:   Patient w/ a Hx of hypercapnic respiratory failure on this admission requiring intubation. Now on nasal canula.   - BiPAP QHS  - Conitnue home Breo  - Duonebs, IS, acapella  - CXR/ABG PRN      Cardiac:   - HDS of pressors.   - Continuing home meds    Renal:    Questionable Hx of CKD with urine output increased. U/A dirty with yeast in culture. Blood cultures have been negative.   - Continue antifungals  - Monitor urine output with strict I/O   - Trend BUN/Cr    ID:   Afebrile over last 24H with no Leukocytosis  - Questionable RUL opacification in lungs.    - UCx grew candida  - Blood Cx NGTD  - Continue fluconazole  - Trend WBC w/ daily CBCs    Hem/Onc:   - Stable  -Trend CBC  - Transfuse for Hgb < 7.0 g/dL    Endocrine:    Hx of T2DM. Endocrine consulted, appreciate assistance. Elevated TSH with low T4  - Accuchecks Q6H as patient NPO  - Low dose SSI Q6H    Fluids/Electrolytes/Nutrition/GI:   Patient with significantly elevated Alk phos and GGT with US that shows thickened wall. Concern for possible acalculous cholecystitis although etiology remains unclear. HIDA appears negative.  - Follow up official read from HIDA.    - Replace electrolytes PRN  - TF trickle today.     PPx:  - DVT: heparin 5000u subq Q8H  - Bowel: lactulose for 3-4 BMs/day  - PUP: Protonix 40 mg daily  - VAP: N/A    DISPO:  - Stable for stepdown to medicine            Critical care was time spent personally by me on the following activities: development of treatment plan with patient or surrogate and bedside caregivers, discussions with consultants, evaluation of patient's response to treatment, examination of patient, ordering and performing treatments and interventions, ordering and review of laboratory studies, ordering and review of radiographic studies, pulse oximetry, re-evaluation of patient's condition.  This critical care time did not overlap with that of any other provider or involve time for any procedures.     Hafsa Florian, DO  Critical Care - Surgery  [facility]

## 2019-02-25 NOTE — PROGRESS NOTES
"[facility]  General Surgery  Progress Note    Subjective:     History of Present Illness:  64 yo W with a history of HTN, COPD on home oxygen, HFpEF, IDDMII, CVA on plavix, HTN, debility after fall and broken hip, and PE who presents to the ED with a several month history of nausea, vomiting, inability to tolerate a diet and weight loss. She has had multiple admissions in the past few months for different ailments. She presents today with continued nausea, vomiting and abdominal pain that is mostly worse in the RLQ. During the examination, she states her pain was all over her abdomen because she was retching so much. She states that she has lost close to 40lbs since her surgery and that she only able to keep broth down. She had a lap converted to open appendectomy back in August 2018 that was complicated by a wound infection, C diff and failure to thrive. This seems to persists. She is now wheelchair bound (per her) and apparently lives in [place] but is here in [place] with her daughter.    She had a CT scan in the ED which revealed an irregular shaped rim enhancing fluid collection measuring at least 4.0 x 3.0 cm ight hemipelvis at the site of prior appendectomy. Surgery was consulted for further recommendations. She was also noted to have a "knot" at the RLQ incision site that is also tender.    Post-Op Info:  Procedure(s) (LRB):  LAPAROTOMY, EXPLORATORY (N/A)  INCISION AND DRAINAGE, ABSCESS-  drainage of pelvic abscess (N/A)  EXCISION, ABSCESS- drainage abdominal wall abscess (N/A)  APPLICATION, WOUND VAC (N/A)   31 Days Post-Op     Interval History:   Still on bipap. Transferred to medicine team. Better oriented this morning, more responsive. Alk phos 2700 today.    Medications:  Continuous Infusions:    Scheduled Meds:   albuterol-ipratropium  3 mL Nebulization Q4H WAKE    aspirin  81 mg Oral Daily    clopidogrel  75 mg Oral Daily    fluconazole (DIFLUCAN) IVPB  400 mg Intravenous " Q24H    fluticasone-vilanterol  1 puff Inhalation Daily    furosemide  20 mg Oral BID    heparin (porcine)  5,000 Units Subcutaneous Q8H    lactulose  20 g Per G Tube BID    miconazole nitrate 2%   Topical (Top) BID    pantoprozole (PROTONIX) IV  40 mg Intravenous Daily    senna-docusate 8.6-50 mg  1 tablet Per G Tube BID     PRN Meds:sodium chloride, albuterol-ipratropium, calcium gluconate IVPB, calcium gluconate IVPB, calcium gluconate IVPB, dextrose 50%, diclofenac sodium, diphenhydrAMINE, glucagon (human recombinant), HYDROmorphone, insulin aspart U-100, magnesium sulfate IVPB, magnesium sulfate IVPB, ondansetron, potassium chloride 10%, potassium chloride 10%, potassium chloride 10%, potassium, sodium phosphates, potassium, sodium phosphates, potassium, sodium phosphates, sodium chloride 0.9%     Review of patient's allergies indicates:   Allergen Reactions    Ace inhibitors Swelling    Carvedilol      Other reaction(s): Other (See Comments)  blister    Hydralazine analogues     Metformin Nausea And Vomiting    Tetracycline Itching    Tetracyclines Swelling    Travatan (with benzalkonium) [travoprost (benzalkonium)]     Travoprost Itching     Other reaction(s): Other (See Comments)  Blurry vision     Objective:     Vital Signs (Most Recent):  Temp: 98.4 °F (36.9 °C) (02/24/19 0800)  Pulse: 106 (02/24/19 0900)  Resp: 18 (02/24/19 0900)  BP: (!) 137/92 (02/24/19 0800)  SpO2: 100 % (02/24/19 0900) Vital Signs (24h Range):  Temp:  [97.4 °F (36.3 °C)-98.4 °F (36.9 °C)] 98.4 °F (36.9 °C)  Pulse:  [] 106  Resp:  [11-36] 18  SpO2:  [97 %-100 %] 100 %  BP: (115-185)/() 137/92  Arterial Line BP: (124-174)/(60-91) 159/79     Weight: 87.1 kg (192 lb)  Body mass index is 35.12 kg/m².    Intake/Output - Last 3 Shifts       02/22 0700 - 02/23 0659 02/23 0700 - 02/24 0659 02/24 0700 - 02/25 0659    I.V. (mL/kg) 2113 (24.3) 1195 (13.7)     Blood 600      NG/ 150 120    IV Piggyback 300       Total Intake(mL/kg) 3223 (37) 1345 (15.4) 120 (1.4)    Urine (mL/kg/hr) 833 (0.4) 1140 (0.5)     Stool 800 550     Total Output 1633 1690     Net +1590 -345 +120                 Physical Exam   Constitutional: She appears well-developed and well-nourished.   HENT:   Head: Normocephalic and atraumatic.   Cardiovascular: Normal rate and regular rhythm.   Pulmonary/Chest:   On bipap   Abdominal: Soft.   Soft, diffusely tender, midline firmness  Multiple areas of excoriation and skin breakdown noted to anterior abdomen   Musculoskeletal:   R hand and arm swollen    Neurological:   Not moaning in pain. Answers questions.   Skin: Skin is warm and dry.   Excoriations on abdomen and buttocks   Vitals reviewed.    Significant Labs:  CBC:   Recent Labs   Lab 02/24/19  0343   WBC 12.26   RBC 3.29*   HGB 9.7*   HCT 31.1*      MCV 95   MCH 29.5   MCHC 31.2*     BMP:   Recent Labs   Lab 02/24/19  0343   GLU 94      K 3.5      CO2 28   BUN 20   CREATININE 1.0   CALCIUM 8.8   MG 2.3     CMP:   Recent Labs   Lab 02/24/19  0343   GLU 94   CALCIUM 8.8   ALBUMIN 2.1*   PROT 5.6*      K 3.5   CO2 28      BUN 20   CREATININE 1.0   ALKPHOS 2,140*   ALT 47*   *   BILITOT 7.1*     LFTs:   Recent Labs   Lab 02/24/19  0343   ALT 47*   *   ALKPHOS 2,140*   BILITOT 7.1*   PROT 5.6*   ALBUMIN 2.1*     Coagulation:   Recent Labs   Lab 02/18/19  1208 02/19/19  1441   LABPROT 11.8 11.6   INR 1.2 1.1   APTT 32.3*  --      ABGs:   Recent Labs   Lab 02/24/19  0356   PH 7.339*   PCO2 57.7*   PO2 121*   HCO3 31.0*   POCSATURATED 98   BE 5   Hida negative for acute cholecystitis.    Assessment/Plan:     Acute respiratory failure with hypoxia and hypercarbia    NC now, may need bipap again later     Bilateral lower extremity edema    US ruled out DVT     Abnormal thyroid function test    Endocrinology consulted: recommend against starting thyroid replacement hormone  Also recommended stopping hydrocortisone      Acute cystitis    UA showing many bacteria and 2+ leukocytes - nieves in place, candida, treating with diflucan     Urinary incontinence without sensory awareness    Nieves in place     Elevated liver enzymes    -LFTs, Tbili and Alk phos all increased  -CT showed biliary sludge  -consult hepatology - per recs this is severe cholestasis and likely related to medication or infection, not underlying hepatic pathology  - RUQ US on 2/20 showed thickened gallbladder wall and was equivocal for acute acalculous cholecystitis  - HIDA appears negative f/u official read     On enteral nutrition    Restarted TF     Dysarthria    -PEG placed 2/11/19, NPO, restart TF     Multiple skin tears    -Wound care following     Depression    -Continue Cymbalta     Debility    -PT/OT; history of fall with non displaced oblique right tibia fracture at metaphysis into diaphysis. Was wearing brace.  -Severely debilited; needs placement. SW consulted and following       Severe malnutrition    -restarted TF     Gastroesophageal reflux disease without esophagitis    -Continue BID protonix     Acute on chronic diastolic (congestive) heart failure    Last echo 8/2018 with no WMAs, grade 1DD, EF 60%  -Strict I/O, daily weights  -likely needs diuresis today     Moderate asthma without complication    -Continue with scheduled duonebs  -on NC this am     Elevated troponin    -Cardiology consulted. On ASA/plavix at home     Coronary artery disease involving native coronary artery of native heart without angina pectoris    -ASA, restart plavix     Type 2 diabetes mellitus without complication, without long-term current use of insulin    -SSI, appreciate endocrine assistance          Cassie Choudhary MD  General Surgery  [facility]

## 2019-02-25 NOTE — PLAN OF CARE
Problem: Physical Therapy Goal  Goal: Physical Therapy Goal  Goals to be met by:3/8/19    Patient will increase functional independence with mobility by performin. Supine to sit with Moderate Assistance -not met  2. Sit to stand transfer with Maximum Assistance -not met  3. Bed to chair transfer with Maximum Assistance -not met  4. Sitting at edge of bed x10 minutes with Contact Guard Assistance - not met  5. Lower extremity exercise program x10 reps, with assistance as needed - not met        Outcome: Ongoing (interventions implemented as appropriate)    Pt would benefit from SNF upon discharge.  Appropriate transfer level with nursing staff: Bed <> Chair:  Drawsheet to cardiac chair.    Ese Harp PT, DPT  2019  Pager: [phone]

## 2019-02-25 NOTE — NURSING TRANSFER
Nursing Transfer Note      2/25/2019     Transfer To: 1001    Transfer via bed    Transfer with cardiac monitoring    Transported by [id number]    Medicines sent: Yes    Chart send with patient: Yes    Notified: daughter    Patient reassessed at: 2/25/2019, 1143     Upon arrival to floor: cardiac monitor applied, patient oriented to room, call bell in reach and bed in lowest position

## 2019-02-25 NOTE — PLAN OF CARE
Reviewed insulin regimen and CBG.     Glucose at goal with no insulin requirements. Patient being stepped down to hospital medicine team.    Change POC q6 while NPO with low dose correction.    Endocrine will sign off for now. Please call or reconsult as needed.

## 2019-02-25 NOTE — PT/OT/SLP PROGRESS
Speech Language Pathology  Attempted    Sheryl Martines  MRN: [id number]    Patient not seen today secondary to Unavailable (Comment), Nursing care. SLP unable to return this service date.  Will follow-up at a later date and time as able.    Araceli Keane, TONY-SLP   2/25/2019

## 2019-02-25 NOTE — PLAN OF CARE
Problem: Adult Inpatient Plan of Care  Goal: Plan of Care Review  Outcome: Ongoing (interventions implemented as appropriate)  Transfer orders in place. VSS throughout shift. Pt tolerated BIPAP all night. Remains oriented x3, not oriented to time. Afebrile, NSR, sats >98% throughout shift.  MAP goal >65 and SBP <180 maintained.  Pt complains of pain, PRN dilaudid given q4.  BMS drains appropriately, 150cc output. 2L UO this shift. Accuchecks done q4, no coverage needed. Incision open to air, CDI. Turned q2 hours to prevent further skin breakdown. MD updated on assessments, labs, etc. Plan of care reviewed. Questions and concerns addressed. Will continue to monitor.     Problem: Diabetes Comorbidity  Goal: Blood Glucose Level Within Desired Range  Outcome: Ongoing (interventions implemented as appropriate)  .    Problem: Infection  Goal: Infection Symptom Resolution  Outcome: Ongoing (interventions implemented as appropriate)  .    Problem: Skin Injury Risk Increased  Goal: Skin Health and Integrity  Outcome: Ongoing (interventions implemented as appropriate)  .    Problem: Wound  Goal: Optimal Wound Healing  Outcome: Ongoing (interventions implemented as appropriate)  .    Problem: Pain Acute  Goal: Optimal Pain Control  Outcome: Ongoing (interventions implemented as appropriate)  .

## 2019-02-25 NOTE — ASSESSMENT & PLAN NOTE
63 y.o. female w/ a significant PMHx of COPD, CAD, T2DM, CKD, HTN, CVA w/ residual right sided deficits, significant debility, and recent exploratory laparotomy for pelvis mass/abscess that required an ICU admission postoperatively. Patient stepped back up to the SICU 02/19/19 s/p rapid response for somnolence and altered mental status.    Neuro:   Alert and oriented to person and place. Last CT head negative. No focal neurological deficits. Unclear etiology. Ammonia stable in 50s. Altered mental status likely related to hypercapnia as it improved with BiPAP  - Continue lactulose BID  - PRN dilaudid IV    Pulmonary:   Patient w/ a Hx of hypercapnic respiratory failure on this admission requiring intubation. Now on nasal canula.   - BiPAP QHS  - Conitnue home Breo  - Duonebs, IS, acapella  - CXR/ABG PRN      Cardiac:   - HDS of pressors.   - Continuing home meds    Renal:    Questionable Hx of CKD with urine output increased. U/A dirty with yeast in culture. Blood cultures have been negative.   - Continue antifungals  - Monitor urine output with strict I/O   - Trend BUN/Cr    ID:   Afebrile over last 24H with no Leukocytosis  - Questionable RUL opacification in lungs.    - UCx grew candida  - Blood Cx NGTD  - Continue fluconazole  - Trend WBC w/ daily CBCs    Hem/Onc:   - Stable  -Trend CBC  - Transfuse for Hgb < 7.0 g/dL    Endocrine:    Hx of T2DM. Endocrine consulted, appreciate assistance. Elevated TSH with low T4  - Accuchecks Q6H as patient NPO  - Low dose SSI Q6H    Fluids/Electrolytes/Nutrition/GI:   Patient with significantly elevated Alk phos and GGT with US that shows thickened wall. Concern for possible acalculous cholecystitis although etiology remains unclear. HIDA appears negative.  - Follow up official read from HIDA.   - Replace electrolytes PRN  - TF trickle today.     PPx:  - DVT: heparin 5000u subq Q8H  - Bowel: lactulose for 3-4 BMs/day  - PUP: Protonix 40 mg daily  - VAP: N/A    DISPO:  - Stable  for stepdown to medicine

## 2019-02-25 NOTE — PLAN OF CARE
In MD [facility] rec LT[facility] so SW initiated [facility] referral through [facility].    Kena Cosby LCSW  [id number]     02/25/19 1548   Post-Acute Status   Post-Acute Authorization Placement  (LTAC)   Post-Acute Placement Status Referrals Sent

## 2019-02-25 NOTE — PLAN OF CARE
[facility] Medicine ICU Acceptance Note    Date of Admit: 1/23/2019  Date of Transfer / Stepdown: 2/25/2019  ICU team stepping patient down: MICU  Accepting  team: CONNER-NICKI    Brief History of Present Illness:   Ms. Martines is a 63-year-old woman with heart failure with preserved ejection fraction, COPD on 2 L home oxygen, CAD, type 2 diabetes, hypertension, history of CVA with residual right-sided weakness, history of appendectomy 8/18, and chronic debility who initially presented to [facility] on 01/23 complaining of nausea and abdominal pain.  CT abdomen showed a 4 x 3 cm enhancing fluid collection consistent with abscess.  She also had elevated lactate of 6.8.  She was admitted to the STICU service for initial management.     Hospital/ICU Course:   Patient taken to OR on 01/25 for ex lap with drainage of abscess.  Postoperative course complicated by septic shock secondary to MRSA bacteremia for which she received 2 weeks of IV vancomycin and briefly required pressors, which have now been weaned off.  Wound culture also grew Bacteroides and patient completed 4 weeks of Cipro and Flagyl.  Patient remained intubated postoperatively and was successfully extubated on 01/28.  Initially, she was stabilized and transferred to the floor.  She failed speech language therapy and was started on TPN with subsequent placement of IR guided G-tube in transition to tube feeds on 02/10.  On the floor, patient was making some progress and being followed by [facility]; however, a rapid response was called on 02/19 due to somnolence.  ABG was consistent with acute hypercapnic respiratory failure, and she was placed on BiPAP and transferred back to SICU.  Infectious workup was significant for a UA with 39 WBCs, moderate bacteria, and moderate yeast; urine culture ultimately grew Candida for which she was started on Diflucan IV.  She was also found to have markedly elevated alk-phos, AST, and ALT.  Hepatology was consulted and felt acute  liver injury related to infection versus medication, and not underlying liver disease. And ammonia was checked and was elevated, but hepatology recommended against treatment as they felt patient did not have hepatic encephalopathy.  Right upper quadrant ultrasound showed biliary sludge and gallbladder thickening but was negative for acute cholecystitis.  A subsequent HIDA scan was not consistent with acute cholecystitis; no surgical intervention needed per General surgery evaluation.  Patient's mental status improved in the ICU and is now on nasal cannula oxygen and BiPAP at night.  Medicine initially consulted for assistance with management and noted patient to be markedly fluid overloaded, estimated to be approximately net positive 16 L with this admission.  She had elevated CVP and TTE was consistent volume overload as well as her BNP of > 4900.  She was started on IV diuresis with Lasix with improvement in urine output.  She is to be stepped down to Hospital Medicine for further management and ultimately skilled nursing facility placement.    Consultants and Procedures:     Consultants:  General surgery, Infectious Disease, PT, OT, SLP    Procedures:    Central line placement, Port-A-Cath placement, G-tube placement    Transfer Information:     Diet:  NPO, tube feeds per nutrition recommendations    Physical Activity:  As tolerated per PT OT assessment    To Do / Pending Studies / Follow ups:  · Continue IV Lasix to diuresis patient to treat acute heart failure  · Monitor LFT's as patient is diuresised   · Continue TF via G tube  · Folllow-up urine culture as patient currently on Diflucan for suspected fungal UTI  · Reconsult PT/OT to re-evaluate patient as last recs prior to return to ICU on 2/19. Patient does not have NH SNF benefits and can only go to a hospital based SNF so [facility] following patient to determine if candidate for [facility]. Patient wheelchair bound prior to this admit.   · Follow-up  speech recs. Patient strict NPO and has G tube in place.      Patient has been accepted by Hospital Medicine Team A, who will assume care of the patient upon arrival to the floor from the ICU. Please contact ICU team with any concerns prior to arrival. Please contact [facility] Medicine at [phone] (please do NOT leave a voicemail) when patient arrives to the floor.      Froylan Rajput MD  Hospital Medicine Staff  [facility]   Pager: [phone]          '

## 2019-02-25 NOTE — PLAN OF CARE
Patient transferred to IM service today after transfer from ICU to [place] [address].  Patient hospital history as below from Dr Rajput's note today:    Patient taken to OR on 01/25 for ex lap with drainage of abscess.  Postoperative course complicated by septic shock secondary to MRSA bacteremia for which she received 2 weeks of IV vancomycin and briefly required pressors, which have now been weaned off.  Wound culture also grew Bacteroides and patient completed 4 weeks of Cipro and Flagyl.  Patient remained intubated postoperatively and was successfully extubated on 01/28.  Initially, she was stabilized and transferred to the floor.  She failed speech language therapy and was started on TPN with subsequent placement of IR guided G-tube in transition to tube feeds on 02/10.  On the floor, patient was making some progress and being followed by [facility]; however, a rapid response was called on 02/19 due to somnolence.  ABG was consistent with acute hypercapnic respiratory failure, and she was placed on BiPAP and transferred back to SICU.  Infectious workup was significant for a UA with 39 WBCs, moderate bacteria, and moderate yeast; urine culture ultimately grew Candida for which she was started on Diflucan IV.  She was also found to have markedly elevated alk-phos, AST, and ALT.  Hepatology was consulted and felt acute liver injury related to infection versus medication, and not underlying liver disease. And ammonia was checked and was elevated, but hepatology recommended against treatment as they felt patient did not have hepatic encephalopathy.  Right upper quadrant ultrasound showed biliary sludge and gallbladder thickening but was negative for acute cholecystitis.  A subsequent HIDA scan was not consistent with acute cholecystitis; no surgical intervention needed per General surgery evaluation.  Patient's mental status improved in the ICU and is now on nasal cannula oxygen and BiPAP at night.  Medicine initially  consulted for assistance with management and noted patient to be markedly fluid overloaded, estimated to be approximately net positive 16 L with this admission.  She had elevated CVP and TTE was consistent volume overload as well as her BNP of > 4900.  She was started on IV diuresis with Lasix with improvement in urine output.  She is to be stepped down to Hospital Medicine for further management and ultimately LTAC placement.    Assigned CM will continue to follow for needs.       02/25/19 1550   Discharge Reassessment   Assessment Type Discharge Planning Reassessment   Discharge Plan A Skilled Nursing Facility

## 2019-02-26 PROBLEM — E83.39 HYPOPHOSPHATEMIA: Status: ACTIVE | Noted: 2019-02-26

## 2019-02-26 PROBLEM — N30.00 ACUTE CYSTITIS: Status: RESOLVED | Noted: 2019-02-20 | Resolved: 2019-02-26

## 2019-02-26 LAB
ALBUMIN SERPL BCP-MCNC: 1.9 G/DL
ALP SERPL-CCNC: 4037 U/L
ALT SERPL W/O P-5'-P-CCNC: 114 U/L
ANION GAP SERPL CALC-SCNC: 6 MMOL/L
AST SERPL-CCNC: 741 U/L
BASOPHILS # BLD AUTO: 0.03 K/UL
BASOPHILS NFR BLD: 0.2 %
BILIRUB SERPL-MCNC: 11.4 MG/DL
BUN SERPL-MCNC: 19 MG/DL
CALCIUM SERPL-MCNC: 9.2 MG/DL
CHLORIDE SERPL-SCNC: 99 MMOL/L
CO2 SERPL-SCNC: 40 MMOL/L
CREAT SERPL-MCNC: 0.9 MG/DL
DIFFERENTIAL METHOD: ABNORMAL
EOSINOPHIL # BLD AUTO: 0.3 K/UL
EOSINOPHIL NFR BLD: 2.5 %
ERYTHROCYTE [DISTWIDTH] IN BLOOD BY AUTOMATED COUNT: 20.6 %
EST. GFR  (AFRICAN AMERICAN): >60 ML/MIN/1.73 M^2
EST. GFR  (NON AFRICAN AMERICAN): >60 ML/MIN/1.73 M^2
FUNGUS SPEC CULT: NORMAL
FUNGUS SPEC CULT: NORMAL
GLUCOSE SERPL-MCNC: 145 MG/DL
HCT VFR BLD AUTO: 35.8 %
HGB BLD-MCNC: 10.7 G/DL
IMM GRANULOCYTES # BLD AUTO: 0.25 K/UL
IMM GRANULOCYTES NFR BLD AUTO: 2 %
INR PPP: 1.2
LYMPHOCYTES # BLD AUTO: 3.7 K/UL
LYMPHOCYTES NFR BLD: 30.2 %
MAGNESIUM SERPL-MCNC: 1.6 MG/DL
MCH RBC QN AUTO: 29.4 PG
MCHC RBC AUTO-ENTMCNC: 29.9 G/DL
MCV RBC AUTO: 98 FL
MONOCYTES # BLD AUTO: 1.6 K/UL
MONOCYTES NFR BLD: 13 %
NEUTROPHILS # BLD AUTO: 6.4 K/UL
NEUTROPHILS NFR BLD: 52.1 %
NRBC BLD-RTO: 12 /100 WBC
PHOSPHATE SERPL-MCNC: 2.3 MG/DL
PLATELET # BLD AUTO: 209 K/UL
PMV BLD AUTO: 12.5 FL
POCT GLUCOSE: 152 MG/DL (ref 70–110)
POTASSIUM SERPL-SCNC: 3.3 MMOL/L
PROT SERPL-MCNC: 5.6 G/DL
PROTHROMBIN TIME: 12.5 SEC
RBC # BLD AUTO: 3.64 M/UL
SODIUM SERPL-SCNC: 145 MMOL/L
WBC # BLD AUTO: 12.23 K/UL

## 2019-02-26 PROCEDURE — 85610 PROTHROMBIN TIME: CPT

## 2019-02-26 PROCEDURE — 99233 SBSQ HOSP IP/OBS HIGH 50: CPT | Mod: ,,, | Performed by: HOSPITALIST

## 2019-02-26 PROCEDURE — 99900035 HC TECH TIME PER 15 MIN (STAT)

## 2019-02-26 PROCEDURE — 97530 THERAPEUTIC ACTIVITIES: CPT

## 2019-02-26 PROCEDURE — 94799 UNLISTED PULMONARY SVC/PX: CPT

## 2019-02-26 PROCEDURE — 85025 COMPLETE CBC W/AUTO DIFF WBC: CPT

## 2019-02-26 PROCEDURE — 25000003 PHARM REV CODE 250: Performed by: HOSPITALIST

## 2019-02-26 PROCEDURE — 25000003 PHARM REV CODE 250: Performed by: STUDENT IN AN ORGANIZED HEALTH CARE EDUCATION/TRAINING PROGRAM

## 2019-02-26 PROCEDURE — 27000646 HC AEROBIKA DEVICE

## 2019-02-26 PROCEDURE — 80053 COMPREHEN METABOLIC PANEL: CPT

## 2019-02-26 PROCEDURE — 20600001 HC STEP DOWN PRIVATE ROOM

## 2019-02-26 PROCEDURE — 94668 MNPJ CHEST WALL SBSQ: CPT

## 2019-02-26 PROCEDURE — 99233 SBSQ HOSP IP/OBS HIGH 50: CPT | Mod: ,,, | Performed by: PHYSICIAN ASSISTANT

## 2019-02-26 PROCEDURE — 94761 N-INVAS EAR/PLS OXIMETRY MLT: CPT

## 2019-02-26 PROCEDURE — 97110 THERAPEUTIC EXERCISES: CPT

## 2019-02-26 PROCEDURE — 25000242 PHARM REV CODE 250 ALT 637 W/ HCPCS: Performed by: HOSPITALIST

## 2019-02-26 PROCEDURE — 27000221 HC OXYGEN, UP TO 24 HOURS

## 2019-02-26 PROCEDURE — 99233 PR SUBSEQUENT HOSPITAL CARE,LEVL III: ICD-10-PCS | Mod: ,,, | Performed by: PHYSICIAN ASSISTANT

## 2019-02-26 PROCEDURE — 63600175 PHARM REV CODE 636 W HCPCS: Performed by: STUDENT IN AN ORGANIZED HEALTH CARE EDUCATION/TRAINING PROGRAM

## 2019-02-26 PROCEDURE — 97535 SELF CARE MNGMENT TRAINING: CPT

## 2019-02-26 PROCEDURE — 94640 AIRWAY INHALATION TREATMENT: CPT

## 2019-02-26 PROCEDURE — 84100 ASSAY OF PHOSPHORUS: CPT

## 2019-02-26 PROCEDURE — 99233 PR SUBSEQUENT HOSPITAL CARE,LEVL III: ICD-10-PCS | Mod: ,,, | Performed by: HOSPITALIST

## 2019-02-26 PROCEDURE — 97168 OT RE-EVAL EST PLAN CARE: CPT

## 2019-02-26 PROCEDURE — 94664 DEMO&/EVAL PT USE INHALER: CPT

## 2019-02-26 PROCEDURE — 63600175 PHARM REV CODE 636 W HCPCS: Performed by: INTERNAL MEDICINE

## 2019-02-26 PROCEDURE — 92526 ORAL FUNCTION THERAPY: CPT

## 2019-02-26 PROCEDURE — 83735 ASSAY OF MAGNESIUM: CPT

## 2019-02-26 RX ORDER — POTASSIUM CHLORIDE 20 MEQ/15ML
40 SOLUTION ORAL
Status: DISCONTINUED | OUTPATIENT
Start: 2019-02-26 | End: 2019-02-26 | Stop reason: SDUPTHER

## 2019-02-26 RX ORDER — POTASSIUM CHLORIDE 20 MEQ/15ML
40 SOLUTION ORAL EVERY 4 HOURS
Status: DISPENSED | OUTPATIENT
Start: 2019-02-26 | End: 2019-02-26

## 2019-02-26 RX ORDER — SODIUM,POTASSIUM PHOSPHATES 280-250MG
1 POWDER IN PACKET (EA) ORAL
Status: COMPLETED | OUTPATIENT
Start: 2019-02-26 | End: 2019-03-01

## 2019-02-26 RX ORDER — POTASSIUM CHLORIDE 20 MEQ/15ML
40 SOLUTION ORAL
Status: DISCONTINUED | OUTPATIENT
Start: 2019-02-26 | End: 2019-02-26

## 2019-02-26 RX ORDER — POTASSIUM CHLORIDE 20 MEQ/15ML
20 SOLUTION ORAL ONCE
Status: DISCONTINUED | OUTPATIENT
Start: 2019-02-26 | End: 2019-02-26

## 2019-02-26 RX ADMIN — HYDROMORPHONE HYDROCHLORIDE 0.5 MG: 1 INJECTION, SOLUTION INTRAMUSCULAR; INTRAVENOUS; SUBCUTANEOUS at 11:02

## 2019-02-26 RX ADMIN — HYDROMORPHONE HYDROCHLORIDE 0.5 MG: 1 INJECTION, SOLUTION INTRAMUSCULAR; INTRAVENOUS; SUBCUTANEOUS at 09:02

## 2019-02-26 RX ADMIN — FUROSEMIDE 40 MG: 10 INJECTION, SOLUTION INTRAVENOUS at 05:02

## 2019-02-26 RX ADMIN — STANDARDIZED SENNA CONCENTRATE AND DOCUSATE SODIUM 1 TABLET: 8.6; 5 TABLET, FILM COATED ORAL at 10:02

## 2019-02-26 RX ADMIN — LACTULOSE 20 G: 20 SOLUTION ORAL at 09:02

## 2019-02-26 RX ADMIN — POTASSIUM CHLORIDE 40 MEQ: 20 SOLUTION ORAL at 11:02

## 2019-02-26 RX ADMIN — CLOPIDOGREL 75 MG: 75 TABLET, FILM COATED ORAL at 10:02

## 2019-02-26 RX ADMIN — HEPARIN SODIUM 5000 UNITS: 5000 INJECTION, SOLUTION INTRAVENOUS; SUBCUTANEOUS at 06:02

## 2019-02-26 RX ADMIN — IPRATROPIUM BROMIDE AND ALBUTEROL SULFATE 3 ML: .5; 3 SOLUTION RESPIRATORY (INHALATION) at 01:02

## 2019-02-26 RX ADMIN — IPRATROPIUM BROMIDE AND ALBUTEROL SULFATE 3 ML: .5; 3 SOLUTION RESPIRATORY (INHALATION) at 08:02

## 2019-02-26 RX ADMIN — POTASSIUM CHLORIDE 40 MEQ: 20 SOLUTION ORAL at 03:02

## 2019-02-26 RX ADMIN — HYDROMORPHONE HYDROCHLORIDE 0.5 MG: 1 INJECTION, SOLUTION INTRAMUSCULAR; INTRAVENOUS; SUBCUTANEOUS at 02:02

## 2019-02-26 RX ADMIN — IPRATROPIUM BROMIDE AND ALBUTEROL SULFATE 3 ML: .5; 3 SOLUTION RESPIRATORY (INHALATION) at 07:02

## 2019-02-26 RX ADMIN — ASPIRIN 81 MG CHEWABLE TABLET 81 MG: 81 TABLET CHEWABLE at 10:02

## 2019-02-26 RX ADMIN — HEPARIN SODIUM 5000 UNITS: 5000 INJECTION, SOLUTION INTRAVENOUS; SUBCUTANEOUS at 09:02

## 2019-02-26 RX ADMIN — METRONIDAZOLE 500 MG: 500 TABLET ORAL at 06:02

## 2019-02-26 RX ADMIN — METRONIDAZOLE 500 MG: 500 TABLET ORAL at 03:02

## 2019-02-26 RX ADMIN — HYDROMORPHONE HYDROCHLORIDE 0.5 MG: 1 INJECTION, SOLUTION INTRAMUSCULAR; INTRAVENOUS; SUBCUTANEOUS at 06:02

## 2019-02-26 RX ADMIN — STANDARDIZED SENNA CONCENTRATE AND DOCUSATE SODIUM 1 TABLET: 8.6; 5 TABLET, FILM COATED ORAL at 09:02

## 2019-02-26 RX ADMIN — PANTOPRAZOLE SODIUM 40 MG: 40 GRANULE, DELAYED RELEASE ORAL at 10:02

## 2019-02-26 RX ADMIN — POTASSIUM & SODIUM PHOSPHATES POWDER PACK 280-160-250 MG 1 PACKET: 280-160-250 PACK at 05:02

## 2019-02-26 RX ADMIN — HEPARIN SODIUM 5000 UNITS: 5000 INJECTION, SOLUTION INTRAVENOUS; SUBCUTANEOUS at 03:02

## 2019-02-26 RX ADMIN — HYDROMORPHONE HYDROCHLORIDE 0.5 MG: 1 INJECTION, SOLUTION INTRAMUSCULAR; INTRAVENOUS; SUBCUTANEOUS at 05:02

## 2019-02-26 RX ADMIN — POTASSIUM & SODIUM PHOSPHATES POWDER PACK 280-160-250 MG 1 PACKET: 280-160-250 PACK at 12:02

## 2019-02-26 RX ADMIN — LACTULOSE 20 G: 20 SOLUTION ORAL at 10:02

## 2019-02-26 RX ADMIN — POTASSIUM & SODIUM PHOSPHATES POWDER PACK 280-160-250 MG 1 PACKET: 280-160-250 PACK at 09:02

## 2019-02-26 RX ADMIN — CIPROFLOXACIN HYDROCHLORIDE 500 MG: 500 TABLET, FILM COATED ORAL at 10:02

## 2019-02-26 NOTE — ASSESSMENT & PLAN NOTE
Contributing Nutrition Diagnosis  Malnutrition in the context of Acute Illness/Injury    Related to (etiology):  Decreased PO intake 2/2 abdominal pain and N/V    Signs and Symptoms (as evidenced by):  Energy Intake: <75% of estimated energy requirement for several months per patient  Body Fat Depletion: mild and moderate depletion of orbitals and triceps   Muscle Mass Depletion: mild and moderate depletion of temples, clavicle region and lower extremities   Weight Loss: 12.7% x 5 months   Fluid Accumulation: mild and moderate    Interventions/Recommendations (treatment strategy):  Collaboration and Referral of Nutrition Care   Enteral Nutrition     Nutrition Diagnosis Status:  Improving - receiving 100% of EEN/EPN via TF

## 2019-02-26 NOTE — SUBJECTIVE & OBJECTIVE
Past Medical History:   Diagnosis Date    Abnormal LFTs 12/14/2018    Closed compression fracture of fourth lumbar vertebra     Closed fracture of right tibia and fibula 10/3/2018    COPD (chronic obstructive pulmonary disease)     home O2    Coronary artery disease     Diabetes mellitus     Fall 10/4/2018    Glaucoma     High cholesterol     Hypertension     Infected incision 9/20/2018    Iritis     Pulmonary embolus     S/P appendectomy 9/11/2018    Stroke     rt sided weakness.       Past Surgical History:   Procedure Laterality Date    ABDOMINAL SURGERY      APPENDECTOMY N/A 8/26/2018    Performed by [facility]rez [facility] at [facility] OR    APPENDECTOMY, LAPAROSCOPIC---CONVERTED TO OPEN APPENDECTOMY @0950 N/A 8/26/2018    Performed by [facility] at [facility] OR    APPLICATION, WOUND VAC N/A 1/25/2019    Performed by Monster Laurent MD at [facility] OR [address] [place] [address]    BACK SURGERY      stimulator    CATARACT EXTRACTION      EXCISION, ABSCESS- drainage abdominal wall abscess N/A 1/25/2019    Performed by Monster Laurent MD at [facility] OR [address] [place] [address]    HYSTERECTOMY      INCISION AND DRAINAGE, ABSCESS-  drainage of pelvic abscess N/A 1/25/2019    Performed by Monster Laurent MD at [facility] OR [address] [place] [address]    LAPAROTOMY, EXPLORATORY N/A 1/25/2019    Performed by Monster Laurent MD at [facility] OR [address] [place] [address]    TOTAL HIP ARTHROPLASTY Left     2008       Review of patient's allergies indicates:   Allergen Reactions    Ace inhibitors Swelling    Carvedilol      Other reaction(s): Other (See Comments)  blister    Hydralazine analogues     Metformin Nausea And Vomiting    Tetracycline Itching    Tetracyclines Swelling    Travatan (with benzalkonium) [travoprost (benzalkonium)]     Travoprost Itching     Other reaction(s): Other (See Comments)  Blurry vision       Medications:  Medications Prior to Admission   Medication Sig    acetaminophen (TYLENOL) 500 MG tablet Take 1,000 mg by mouth 2 (two)  times daily as needed for Pain.    albuterol (PROVENTIL/VENTOLIN HFA) 90 mcg/actuation inhaler Inhale 2 puffs into the lungs every 6 (six) hours as needed for Wheezing or Shortness of Breath. Rescue    albuterol-ipratropium (DUO-NEB) 2.5 mg-0.5 mg/3 mL nebulizer solution Take 3 mLs by nebulization every 4 (four) hours as needed for Wheezing or Shortness of Breath. Rescue     aspirin (ECOTRIN) 81 MG EC tablet Take 1 tablet (81 mg total) by mouth once daily.    baclofen (LIORESAL) 10 MG tablet Take 10 mg by mouth 2 (two) times daily as needed (MUSCLE SPASMS).    cefdinir (OMNICEF) 300 MG capsule TK ONE C PO  BID FOR 7 DAYS    cephALEXin (KEFLEX) 750 MG capsule TK ONE C PO TID FOR 5 DAYS    ciprofloxacin HCl (CIPRO) 250 MG tablet Take 1 tablet (250 mg total) by mouth every 12 (twelve) hours. Starting 12/15 evening    clopidogrel (PLAVIX) 75 mg tablet Take 75 mg by mouth once daily.    diclofenac sodium (VOLTAREN) 1 % Gel Apply 2 g topically daily as needed (PAIN).    diltiaZEM (CARDIZEM CD) 180 MG 24 hr capsule Take 180 mg by mouth once daily.    DULoxetine (CYMBALTA) 60 MG capsule Take 60 mg by mouth once daily.    fluticasone-vilanterol (BREO ELLIPTA) 100-25 mcg/dose diskus inhaler Inhale 1 puff into the lungs once daily. Controller    gabapentin (NEURONTIN) 300 MG capsule Take 300 mg by mouth once daily.    Lactobacillus rhamnosus-fiber 2.5 billion cell-3.5 gram PwPk Take 1 capsule by mouth.    lansoprazole (PREVACID) 30 MG capsule Take 30 mg by mouth once daily.    losartan (COZAAR) 100 MG tablet Take 100 mg by mouth once daily.     ondansetron (ZOFRAN) 4 MG tablet Take 1 tablet (4 mg total) by mouth every 6 (six) hours as needed.    ondansetron (ZOFRAN) 4 MG tablet Take 4-8 mg by mouth.    potassium chloride SA (K-DUR,KLOR-CON) 10 MEQ tablet Take 10 mEq by mouth.    predniSONE (DELTASONE) 10 MG tablet Take 4 tablets (40 mg) on 12/16, then take 1 tablet (10 mg) daily    pyridoxine, vitamin B6,  (VITAMIN B-6) 50 MG Tab Take 1 tablet (50 mg total) by mouth once daily.    simvastatin (ZOCOR) 40 MG tablet Take 40 mg by mouth.    theophylline (THEODUR) 300 mg 24 hr capsule Take 300 mg by mouth once daily.    traMADol (ULTRAM) 50 mg tablet TK 1 T PO BID PRN    [DISCONTINUED] baclofen (LIORESAL) 10 MG tablet Take 10 mg by mouth.    [DISCONTINUED] furosemide (LASIX) 40 MG tablet Take 40 mg by mouth once daily.     [DISCONTINUED] HYDROcodone-acetaminophen (NORCO) 5-325 mg per tablet Take 1 tablet by mouth every 4 to 6 hours as needed.    [DISCONTINUED] prazosin (MINIPRESS) 5 MG capsule TK 1 C PO BID     Antibiotics (From admission, onward)    Start     Stop Route Frequency Ordered    02/25/19 2200  metroNIDAZOLE tablet 500 mg      03/05 2159 PER G TUBE Every 8 hours 02/25/19 1432    02/25/19 2100  ciprofloxacin HCl tablet 500 mg      03/05 2059 PER G TUBE Every 12 hours 02/25/19 1433        Antifungals (From admission, onward)    Start     Stop Route Frequency Ordered    02/04/19 2100  miconazole nitrate 2% ointment      -- Top 2 times daily 02/04/19 1716        Antivirals (From admission, onward)    None           Immunization History   Administered Date(s) Administered    PPD Test 08/28/2018, 09/14/2018       Family History     Problem Relation (Age of Onset)    Cancer Father    Diabetes Brother    Lupus Sister    Multiple sclerosis Mother        Social History     Socioeconomic History    Marital status:      Spouse name: None    Number of children: None    Years of education: None    Highest education level: None   Social Needs    Financial resource strain: None    Food insecurity - worry: None    Food insecurity - inability: None    Transportation needs - medical: None    Transportation needs - non-medical: None   Occupational History    None   Tobacco Use    Smoking status: Never Smoker    Smokeless tobacco: Never Used   Substance and Sexual Activity    Alcohol use: No      Frequency: Never    Drug use: No    Sexual activity: No     Partners: Male   Other Topics Concern    None   Social History Narrative    None     Review of Systems   Reason unable to perform ROS: other.     Objective:     Vital Signs (Most Recent):  Temp: 97.9 °F (36.6 °C) (02/26/19 1130)  Pulse: 110 (02/26/19 1327)  Resp: 20 (02/26/19 1327)  BP: (!) 131/95 (02/26/19 1130)  SpO2: 100 % (02/26/19 1327) Vital Signs (24h Range):  Temp:  [97 °F (36.1 °C)-98.3 °F (36.8 °C)] 97.9 °F (36.6 °C)  Pulse:  [103-115] 110  Resp:  [16-20] 20  SpO2:  [97 %-100 %] 100 %  BP: (131-148)/(86-95) 131/95     Weight: 87.1 kg (192 lb)  Body mass index is 35.12 kg/m².    Estimated Creatinine Clearance: 65.6 mL/min (based on SCr of 0.9 mg/dL).    Physical Exam   Constitutional: She appears well-developed.   HENT:   Head: Normocephalic and atraumatic.   Eyes: Conjunctivae are normal. Right eye exhibits no discharge. Left eye exhibits no discharge.   Neck: Neck supple.   Cardiovascular: Normal rate and regular rhythm. Exam reveals no gallop and no friction rub.   No murmur heard.  Pulmonary/Chest: Breath sounds normal. She has no wheezes. She has no rales.   Abdominal:   Staples of abdominal incision  G tube in place  Rectal and nieves tube   Musculoskeletal: She exhibits edema. She exhibits no deformity.   Skin: Skin is warm. She is not diaphoretic.       Significant Labs:   Blood Culture:   Recent Labs   Lab 01/24/19  0833 01/24/19  0839 01/25/19  1752 01/26/19  0109 02/19/19  1531   LABBLOO Gram stain aer bottle: Gram positive cocci in clusters resembling Staph   Results called to and read back by: Micheline Martin RN 01/25/2019    03:43  METHICILLIN RESISTANT STAPHYLOCOCCUS AUREUS  ID consult required at [facility],[place],[facility] and [facility].   Gram stain aer bottle: Gram positive cocci in clusters resembling Staph   Results called to and read back by: Micheline Montgomery RN 01/25/2019    02:39  METHICILLIN RESISTANT  STAPHYLOCOCCUS AUREUS  ID consult required at [facility],[place],[facility] and [facility]   locations.  For susceptibility see order # [id number]   No growth after 5 days. No growth after 5 days. No growth after 5 days.  No growth after 5 days.     CBC:   Recent Labs   Lab 02/25/19  0353 02/26/19 0419   WBC 11.04 12.23   HGB 10.1* 10.7*   HCT 33.4* 35.8*    209     CMP:   Recent Labs   Lab 02/25/19  0353 02/26/19  0419    145   K 3.2* 3.3*    99   CO2 33* 40*   GLU 86 145*   BUN 19 19   CREATININE 1.0 0.9   CALCIUM 8.8 9.2   PROT 5.5* 5.6*   ALBUMIN 1.9* 1.9*   BILITOT 8.8* 11.4*   ALKPHOS 2,766* 4,037*   * 741*   ALT 74* 114*   ANIONGAP 8 6*   EGFRNONAA >60.0 >60.0     Urine Studies:   Recent Labs   Lab 09/29/18  0529  02/19/19  1935   COLORU Straw   < > Amanda   APPEARANCEUA Clear   < > Cloudy*   PHUR 6.0   < > 5.0   SPECGRAV 1.025   < > >=1.030*   PROTEINUA Negative   < > 1+*   GLUCUA 3+*   < > Negative   KETONESU Negative   < > Negative   BILIRUBINUA Negative   < > 2+*   OCCULTUA 1+*   < > 1+*   NITRITE Negative   < > Negative   UROBILINOGEN Negative  --   --    LEUKOCYTESUR Negative   < > 2+*   RBCUA 0   < > 8*   WBCUA 1   < > 39*   BACTERIA Rare   < > Many*   SQUAMEPITHEL 0   < > 3   HYALINECASTS  --    < > 10*    < > = values in this interval not displayed.     Wound Culture:   Recent Labs   Lab 09/05/18  1246 09/10/18  1817 01/25/19  1000 01/25/19  1002   LABAERO METHICILLIN RESISTANT STAPHYLOCOCCUS AUREUS  Few    METHICILLIN RESISTANT STAPHYLOCOCCUS AUREUS  Moderate   METHICILLIN RESISTANT STAPHYLOCOCCUS AUREUS  Moderate  Skin david also present   No growth No growth     All pertinent labs within the past 24 hours have been reviewed.    Significant Imaging: I have reviewed all pertinent imaging results/findings within the past 24 hours.

## 2019-02-26 NOTE — PT/OT/SLP PROGRESS
"Speech Language Pathology Treatment    Patient Name:  Sheryl Martines   MRN:  [id number]  Admitting Diagnosis: Acute metabolic encephalopathy    Recommendations:                 General Recommendations:  Dysphagia therapy  Diet recommendations:  NPO, Liquid Diet Level: NPO   Aspiration Precautions: Continue alternate means of nutrition and Strict aspiration precautions   General Precautions: Standard, aspiration, NPO, fall  Communication strategies:  provide increased time to answer and go to room if call light pushed    Subjective     Patient awake but lethargic during session  "When can I have ice chips?"  Communicated with nurse prior to session     Pain/Comfort:  · Pain Rating 1: 0/10  · Pain Rating Post-Intervention 1: 0/10    Objective:     Has the patient been evaluated by SLP for swallowing?   Yes  Keep patient NPO? Yes   Current Respiratory Status: nasal cannula      Patient seen for continued swallow assessment and dysphagia therapy. She was sitting up in bed during session. Patient with improved interaction and communication during this session compared with prior ST notes. When patient's HOB was elevated she immediately cleared her throat and elevated mucus to her oral cavity which was suctioned used [place]auer. She exhibited a relatively strong throat clear/cough prior to PO trials. She produced a dry volitional swallow that was mildly delayed with reduced laryngeal elevation. She tolerated ice chip trials x4 with no overt signs of aspiration; however, following fourth presentation, patient exhibited wet vocal quality. Throughout PO trials, she completed effortful swallow x10, pitch glides x5 and lingual strengthening exercises x5 with max assist from SLP. She attempted a rep of Maria M, but was unable to complete exercise. Throughout exercises, patient required consistent prompting to continue participating and she became visibly fatigued as the session progressed. Session was halted when patient required " consistent tactile cueing to maintain alertness. SLP educated patient regarding POC, but she would benefit from further instruction. Patient left in bed with call light within reach.     Assessment:     Sheryl Martines is a 63 y.o. female with an SLP diagnosis of Dysphagia.      Goals:   Multidisciplinary Problems     SLP Goals        Problem: SLP Goal    Goal Priority Disciplines Outcome   SLP Goal     SLP Ongoing (interventions implemented as appropriate)   Description:  Speech Language Pathology Goals  Goals expected to be met by 2/28/19  1. Pt will participate in further, instrumental assessment via MBSS   2. Educate Pt and family on S/S aspiration and aspiration precautions   3. Pt will complete dysphagia exercises x10 ea with good effort 90% of attempts to improve BOT coordination and pharyngeal strength/coordination.                                 Plan:     · Patient to be seen:  3 x/week   · Plan of Care expires:  03/21/19  · Plan of Care reviewed with:  patient   · SLP Follow-Up:  Yes       Discharge recommendations:  nursing facility, skilled   Barriers to Discharge:  Level of Skilled Assistance Needed     Time Tracking:     SLP Treatment Date:   02/26/19  Speech Start Time:  1055  Speech Stop Time:  1110     Speech Total Time (min):  15 min    Billable Minutes: Treatment Swallowing Dysfunction 15    Danny Norman CF-SLP  Speech-Language Pathology  Pager: [phone]   02/26/2019

## 2019-02-26 NOTE — SUBJECTIVE & OBJECTIVE
Interval History:   On NC this morning. Resting peacefully, not having abdominal pain.  LFTs continue to trend up    Medications:  Continuous Infusions:    Scheduled Meds:   albuterol-ipratropium  3 mL Nebulization Q6H WAKE    aspirin  81 mg Per G Tube Daily    ciprofloxacin HCl  500 mg Per G Tube Q12H    clopidogrel  75 mg Per G Tube Daily    fluticasone-vilanterol  1 puff Inhalation Daily    furosemide  40 mg Intravenous BID    heparin (porcine)  5,000 Units Subcutaneous Q8H    lactulose  20 g Per G Tube BID    metroNIDAZOLE  500 mg Per G Tube Q8H    miconazole nitrate 2%   Topical (Top) BID    pantoprazole  40 mg Per G Tube Daily    senna-docusate 8.6-50 mg  1 tablet Per G Tube BID     PRN Meds:albuterol-ipratropium, diclofenac sodium, diphenhydrAMINE, HYDROmorphone, ondansetron, sodium chloride 0.9%     Review of patient's allergies indicates:   Allergen Reactions    Ace inhibitors Swelling    Carvedilol      Other reaction(s): Other (See Comments)  blister    Hydralazine analogues     Metformin Nausea And Vomiting    Tetracycline Itching    Tetracyclines Swelling    Travatan (with benzalkonium) [travoprost (benzalkonium)]     Travoprost Itching     Other reaction(s): Other (See Comments)  Blurry vision     Objective:     Vital Signs (Most Recent):  Temp: 97 °F (36.1 °C) (02/26/19 0448)  Pulse: 105 (02/26/19 0709)  Resp: 19 (02/26/19 0448)  BP: (!) 140/87 (02/26/19 0448)  SpO2: 97 % (02/26/19 0448) Vital Signs (24h Range):  Temp:  [97 °F (36.1 °C)-98.3 °F (36.8 °C)] 97 °F (36.1 °C)  Pulse:  [] 105  Resp:  [14-28] 19  SpO2:  [97 %-100 %] 97 %  BP: (133-143)/(68-92) 140/87     Weight: 87.1 kg (192 lb)  Body mass index is 35.12 kg/m².    Intake/Output - Last 3 Shifts       02/24 0700 - 02/25 0659 02/25 0700 - 02/26 0659 02/26 0700 - 02/27 0659    I.V. (mL/kg) 344 (3.9)      NG/ 1312     Total Intake(mL/kg) 1164 (13.4) 1312 (15.1)     Urine (mL/kg/hr) 2970 (1.4) 4875 (2.3) 500 (3.5)     Drains  0     Stool 500 700     Total Output 7920 9753 500    Net -2306 -4263 -500           Stool Occurrence 1 x 2 x           Physical Exam   Constitutional: She appears well-developed and well-nourished.   HENT:   Head: Normocephalic and atraumatic.   Cardiovascular: Normal rate and regular rhythm.   Pulmonary/Chest:   On nasal cannula   Abdominal: Soft.   Soft, appropriately TTP, midline firmness  Multiple areas of excoriation and skin breakdown noted to anterior abdomen   Neurological:   Not moaning in pain. Answers questions.   Skin: Skin is warm and dry.   Excoriations on abdomen and buttocks   Vitals reviewed.    Significant Labs:  CBC:   Recent Labs   Lab 02/26/19 0419   WBC 12.23   RBC 3.64*   HGB 10.7*   HCT 35.8*      MCV 98   MCH 29.4   MCHC 29.9*     BMP:   Recent Labs   Lab 02/26/19 0419   *      K 3.3*   CL 99   CO2 40*   BUN 19   CREATININE 0.9   CALCIUM 9.2   MG 1.6     CMP:   Recent Labs   Lab 02/26/19 0419   *   CALCIUM 9.2   ALBUMIN 1.9*   PROT 5.6*      K 3.3*   CO2 40*   CL 99   BUN 19   CREATININE 0.9   ALKPHOS 4,037*   *   *   BILITOT 11.4*     LFTs:   Recent Labs   Lab 02/26/19 0419   *   *   ALKPHOS 4,037*   BILITOT 11.4*   PROT 5.6*   ALBUMIN 1.9*     Coagulation:   Recent Labs   Lab 02/19/19  1441   LABPROT 11.6   INR 1.1     ABGs:   Recent Labs   Lab 02/24/19  0356   PH 7.339*   PCO2 57.7*   PO2 121*   HCO3 31.0*   POCSATURATED 98   BE 5   Hida negative for acute cholecystitis.

## 2019-02-26 NOTE — SUBJECTIVE & OBJECTIVE
Interval History:   Symptoms:   Same.  Diet:  Tolerating TF  Activity level:  Impaired due to weakness.    Pain:  No pain.       Review of Systems   Constitutional: Negative for fever.   Respiratory: Negative for shortness of breath.    Cardiovascular: Negative for chest pain.   Gastrointestinal: Negative for abdominal pain.     Objective:     Vital Signs (Most Recent):  Temp: 97.9 °F (36.6 °C) (02/26/19 1130)  Pulse: (!) 113 (02/26/19 1600)  Resp: 20 (02/26/19 1600)  BP: (!) 131/95 (02/26/19 1130)  SpO2: 100 % (02/26/19 1600) Vital Signs (24h Range):  Temp:  [97 °F (36.1 °C)-98.3 °F (36.8 °C)] 97.9 °F (36.6 °C)  Pulse:  [103-115] 113  Resp:  [16-20] 20  SpO2:  [97 %-100 %] 100 %  BP: (131-148)/(86-95) 131/95     Weight: 87.1 kg (192 lb)  Body mass index is 35.12 kg/m².    Intake/Output Summary (Last 24 hours) at 2/26/2019 1750  Last data filed at 2/26/2019 1500  Gross per 24 hour   Intake 962 ml   Output 6145 ml   Net -5183 ml      Physical Exam   Constitutional: No distress.   Neck: No JVD present.   Cardiovascular: Normal rate, regular rhythm and normal heart sounds.   Pulmonary/Chest: Effort normal. No respiratory distress. She has wheezes. She has rales.   Abdominal: Soft. Bowel sounds are normal. She exhibits no distension.   Musculoskeletal: She exhibits edema (2+).   Neurological: She is alert.       MELD-Na score: 18 at 2/26/2019 12:39 PM  MELD score: 18 at 2/26/2019 12:39 PM  Calculated from:  Serum Creatinine: 0.9 mg/dL (Rounded to 1 mg/dL) at 2/26/2019  4:19 AM  Serum Sodium: 145 mmol/L (Rounded to 137 mmol/L) at 2/26/2019  4:19 AM  Total Bilirubin: 11.4 mg/dL at 2/26/2019  4:19 AM  INR(ratio): 1.2 at 2/26/2019 12:39 PM  Age: 63 years    Significant Labs:  CBC:  Recent Labs   Lab 02/25/19  0353 02/26/19 0419   WBC 11.04 12.23   HGB 10.1* 10.7*   HCT 33.4* 35.8*    209     CMP:  Recent Labs   Lab 02/25/19  0353 02/26/19 0419    145   K 3.2* 3.3*    99   CO2 33* 40*   GLU 86 145*   BUN  19 19   CREATININE 1.0 0.9   CALCIUM 8.8 9.2   PROT 5.5* 5.6*   ALBUMIN 1.9* 1.9*   BILITOT 8.8* 11.4*   ALKPHOS 2,766* 4,037*   * 741*   ALT 74* 114*   ANIONGAP 8 6*   EGFRNONAA >60.0 >60.0     PTINR:  Recent Labs   Lab 02/26/19  1239   INR 1.2

## 2019-02-26 NOTE — PLAN OF CARE
Problem: Physical Therapy Goal  Goal: Physical Therapy Goal  Goals to be met by:3/8/19    Patient will increase functional independence with mobility by performin. Supine to sit with Moderate Assistance -not met  2. Sit to stand transfer with Maximum Assistance -not met  3. Bed to chair transfer with Maximum Assistance -not met  4. Sitting at edge of bed x10 minutes with Contact Guard Assistance - not met  5. Lower extremity exercise program x10 reps, with assistance as needed - not met         Outcome: Ongoing (interventions implemented as appropriate)  Pt's goals remain appropriate and pt will continue to benefit from skilled PT services to work towards improved functional mobility including: bed mobility, transfers, and gait.   Clarissa Salvador, PT  2019

## 2019-02-26 NOTE — CONSULTS
[facility]  Infectious Disease  Consult Note    Patient Name: Sheryl Martines  MRN: [id number]  Admission Date: 1/23/2019  Hospital Length of Stay: 33 days  Attending Physician: Aaron Lott MD  Primary Care Provider: Primary Doctor No     Isolation Status: No active isolations      Inpatient consult to Infectious Diseases  Consult performed by: Perla Willett PA-C  Consult ordered by: Froylan Rajput MD  Reason for consult: assistance with antibiotic selection, duration, and timing of re-imaging        ID consult received. Chart being reviewed. Full note with recommendations to follow.    Thank you,  Perla Willett PA-C  [phone]

## 2019-02-26 NOTE — PROGRESS NOTES
[facility]  Infectious Disease  Progress Note    Patient Name: Sheryl Martines  MRN: [id number]  Admission Date: 1/23/2019  Length of Stay: 33 days  Attending Physician: Aaron Lott MD  Primary Care Provider: Primary Doctor No    Isolation Status: No active isolations  Assessment/Plan:      Pelvic abscess in female    Pt is a 63 y.o. female with hx of asthma, COPD, CAD, DM, HTN, CVA 2012 with R side deficits, HFpEF, severe debility , lap converted to open appendectomy 8/2018 complicated by cdiff, failure to thrive and recurrent fluid collection presented to the ED with intractable n/v and abdominal pain. 1/24 CT shows fluid collection in R hemipelvis.    Patient now s/p exploratory laparotomy, washout, and pelvic abscess drainage on 1/25/19.   She was on vancomycin and zosyn on admission. Surgical cultures +bacteroides. Gram stain +GNR. She has been on ciprofloxacin and flagyl since surgery. (week 5). Her repeat CT scan without any fluid collections seen. We are consulted for abx recommendations.     Blood cx positive for MRSA on 1/24. Repeat NGTD. She completed 2 weeks of IV vancomycin 2/8/19.       Plan  1. D/c ciprofloxacin and flagyl as completed therapy for pelvic abscess. Repeat CT scan 2/10, 2/18 with resolution of pelvic abscess. Staples in place. Non tender, no purulent drainage or redness.   2. Recommend removal or exchange of nieves if able  3.  Elevated LFTs. U/s abdomen ?acalculous cholecystitis. HIDA scan negative. Monitor, recommend hepatology evaluation   3. Remained stable, afebrile. Will sign off. Please call if with questions.            Thank you for your consult. I will sign off. Please contact us if you have any additional questions.    Perla Willett PA-C  Infectious Disease  [facility]  pgr [phone]    Subjective:     Principal Problem:Acute metabolic encephalopathy    HPI: Pt is a 63 y.o. female with hx of asthma, COPD, CAD, DM, HTN, CVA 2012 with  R side deficits, HFpEF, severe debility , lap converted to open appendectomy 8/2018 complicated by cdiff, failure to thrive and recurrent fluid collection presented to the ED with intractable n/v and abdominal pain.      Appendectomy complcated by sx site infxn with recurrent fluid collection.  Cxs- MRSA, Enterococcus, Ecoli and Anaerobes.   Abx treatment Vanc 9/11-10/1;  Pip-tazo 9/11-9/13, 9/23-10/1; Ertapenem 9/14-9/22.  No change in fluid collection.  Abx discontinued on 10/1. Lactic acid 6.8, with tachycardia and WBC of 13.  1/24 CT shows fluid collection in R hemipelvis.  Started on vanc and zosyn. Patient now s/p exploratory laparotomy, washout, and pelvic abscess drainage on 1/25/19. Gross purulence.  Blood cx positive for MRSA on 1/24.     Of note pt w/ R subclavian port o cath that has been present since 2005  Past Medical History:   Diagnosis Date    Abnormal LFTs 12/14/2018    Closed compression fracture of fourth lumbar vertebra     Closed fracture of right tibia and fibula 10/3/2018    COPD (chronic obstructive pulmonary disease)     home O2    Coronary artery disease     Diabetes mellitus     Fall 10/4/2018    Glaucoma     High cholesterol     Hypertension     Infected incision 9/20/2018    Iritis     Pulmonary embolus     S/P appendectomy 9/11/2018    Stroke     rt sided weakness.       Past Surgical History:   Procedure Laterality Date    ABDOMINAL SURGERY      APPENDECTOMY N/A 8/26/2018    [facility]    APPENDECTOMY, LAPAROSCOPIC---CONVERTED TO OPEN APPENDECTOMY @0950 N/A 8/26/2018    [facility] at [facility] OR    APPLICATION, WOUND VAC N/A 1/25/2019    [facility] at [facility] OR 2ND FLR    BACK SURGERY      stimulator    CATARACT EXTRACTION      EXCISION, ABSCESS- drainage abdominal wall abscess N/A 1/25/2019    [facility] OR 2ND FLR    HYSTERECTOMY      INCISION  AND DRAINAGE, ABSCESS-  drainage of pelvic abscess N/A 1/25/2019    Performed by Monster Laurent MD at [facility] OR 2ND FLR    LAPAROTOMY, EXPLORATORY N/A 1/25/2019    Performed by [facility] at [facility] OR 2ND FLR    TOTAL HIP ARTHROPLASTY Left     2008       Review of patient's allergies indicates:   Allergen Reactions    Ace inhibitors Swelling    Carvedilol      Other reaction(s): Other (See Comments)  blister    Hydralazine analogues     Metformin Nausea And Vomiting    Tetracycline Itching    Tetracyclines Swelling    Travatan (with benzalkonium) [travoprost (benzalkonium)]     Travoprost Itching     Other reaction(s): Other (See Comments)  Blurry vision       Medications:  Medications Prior to Admission   Medication Sig    acetaminophen (TYLENOL) 500 MG tablet Take 1,000 mg by mouth 2 (two) times daily as needed for Pain.    albuterol (PROVENTIL/VENTOLIN HFA) 90 mcg/actuation inhaler Inhale 2 puffs into the lungs every 6 (six) hours as needed for Wheezing or Shortness of Breath. Rescue    albuterol-ipratropium (DUO-NEB) 2.5 mg-0.5 mg/3 mL nebulizer solution Take 3 mLs by nebulization every 4 (four) hours as needed for Wheezing or Shortness of Breath. Rescue     aspirin (ECOTRIN) 81 MG EC tablet Take 1 tablet (81 mg total) by mouth once daily.    baclofen (LIORESAL) 10 MG tablet Take 10 mg by mouth 2 (two) times daily as needed (MUSCLE SPASMS).    cefdinir (OMNICEF) 300 MG capsule TK ONE C PO  BID FOR 7 DAYS    cephALEXin (KEFLEX) 750 MG capsule TK ONE C PO TID FOR 5 DAYS    ciprofloxacin HCl (CIPRO) 250 MG tablet Take 1 tablet (250 mg total) by mouth every 12 (twelve) hours. Starting 12/15 evening    clopidogrel (PLAVIX) 75 mg tablet Take 75 mg by mouth once daily.    diclofenac sodium (VOLTAREN) 1 % Gel Apply 2 g topically daily as needed (PAIN).    diltiaZEM (CARDIZEM CD) 180 MG 24 hr capsule Take 180 mg by mouth once daily.    DULoxetine (CYMBALTA) 60 MG capsule Take 60 mg by  mouth once daily.    fluticasone-vilanterol (BREO ELLIPTA) 100-25 mcg/dose diskus inhaler Inhale 1 puff into the lungs once daily. Controller    gabapentin (NEURONTIN) 300 MG capsule Take 300 mg by mouth once daily.    Lactobacillus rhamnosus-fiber 2.5 billion cell-3.5 gram PwPk Take 1 capsule by mouth.    lansoprazole (PREVACID) 30 MG capsule Take 30 mg by mouth once daily.    losartan (COZAAR) 100 MG tablet Take 100 mg by mouth once daily.     ondansetron (ZOFRAN) 4 MG tablet Take 1 tablet (4 mg total) by mouth every 6 (six) hours as needed.    ondansetron (ZOFRAN) 4 MG tablet Take 4-8 mg by mouth.    potassium chloride SA (K-DUR,KLOR-CON) 10 MEQ tablet Take 10 mEq by mouth.    predniSONE (DELTASONE) 10 MG tablet Take 4 tablets (40 mg) on 12/16, then take 1 tablet (10 mg) daily    pyridoxine, vitamin B6, (VITAMIN B-6) 50 MG Tab Take 1 tablet (50 mg total) by mouth once daily.    simvastatin (ZOCOR) 40 MG tablet Take 40 mg by mouth.    theophylline (THEODUR) 300 mg 24 hr capsule Take 300 mg by mouth once daily.    traMADol (ULTRAM) 50 mg tablet TK 1 T PO BID PRN    [DISCONTINUED] baclofen (LIORESAL) 10 MG tablet Take 10 mg by mouth.    [DISCONTINUED] furosemide (LASIX) 40 MG tablet Take 40 mg by mouth once daily.     [DISCONTINUED] HYDROcodone-acetaminophen (NORCO) 5-325 mg per tablet Take 1 tablet by mouth every 4 to 6 hours as needed.    [DISCONTINUED] prazosin (MINIPRESS) 5 MG capsule TK 1 C PO BID     Antibiotics (From admission, onward)    Start     Stop Route Frequency Ordered    02/25/19 2200  metroNIDAZOLE tablet 500 mg      03/05 2159 PER G TUBE Every 8 hours 02/25/19 1432    02/25/19 2100  ciprofloxacin HCl tablet 500 mg      03/05 2059 PER G TUBE Every 12 hours 02/25/19 1433        Antifungals (From admission, onward)    Start     Stop Route Frequency Ordered    02/04/19 2100  miconazole nitrate 2% ointment      -- Top 2 times daily 02/04/19 1716        Antivirals (From admission,  onward)    None           Immunization History   Administered Date(s) Administered    PPD Test 08/28/2018, 09/14/2018       Family History     Problem Relation (Age of Onset)    Cancer Father    Diabetes Brother    Lupus Sister    Multiple sclerosis Mother        Social History     Socioeconomic History    Marital status:      Spouse name: None    Number of children: None    Years of education: None    Highest education level: None   Social Needs    Financial resource strain: None    Food insecurity - worry: None    Food insecurity - inability: None    Transportation needs - medical: None    Transportation needs - non-medical: None   Occupational History    None   Tobacco Use    Smoking status: Never Smoker    Smokeless tobacco: Never Used   Substance and Sexual Activity    Alcohol use: No     Frequency: Never    Drug use: No    Sexual activity: No     Partners: Male   Other Topics Concern    None   Social History Narrative    None     Review of Systems   Reason unable to perform ROS: other.     Objective:     Vital Signs (Most Recent):  Temp: 97.9 °F (36.6 °C) (02/26/19 1130)  Pulse: 110 (02/26/19 1327)  Resp: 20 (02/26/19 1327)  BP: (!) 131/95 (02/26/19 1130)  SpO2: 100 % (02/26/19 1327) Vital Signs (24h Range):  Temp:  [97 °F (36.1 °C)-98.3 °F (36.8 °C)] 97.9 °F (36.6 °C)  Pulse:  [103-115] 110  Resp:  [16-20] 20  SpO2:  [97 %-100 %] 100 %  BP: (131-148)/(86-95) 131/95     Weight: 87.1 kg (192 lb)  Body mass index is 35.12 kg/m².    Estimated Creatinine Clearance: 65.6 mL/min (based on SCr of 0.9 mg/dL).    Physical Exam   Constitutional: She appears well-developed.   HENT:   Head: Normocephalic and atraumatic.   Eyes: Conjunctivae are normal. Right eye exhibits no discharge. Left eye exhibits no discharge.   Neck: Neck supple.   Cardiovascular: Normal rate and regular rhythm. Exam reveals no gallop and no friction rub.   No murmur heard.  Pulmonary/Chest: Breath sounds normal. She has  no wheezes. She has no rales.   Abdominal:   Staples of abdominal incision  G tube in place  Rectal and nieves tube   Musculoskeletal: She exhibits edema. She exhibits no deformity.   Skin: Skin is warm. She is not diaphoretic.       Significant Labs:   Blood Culture:   Recent Labs   Lab 01/24/19  0833 01/24/19  0839 01/25/19  1752 01/26/19  0109 02/19/19  1531   LABBLOO Gram stain aer bottle: Gram positive cocci in clusters resembling Staph   Results called to and read back by: Micheline Martin RN 01/25/2019    03:43  METHICILLIN RESISTANT STAPHYLOCOCCUS AUREUS  ID consult required at [facility] and [facility]   locations.   Gram stain aer bottle: Gram positive cocci in clusters resembling Staph   Results called to and read back by: Micheline Montgomery RN 01/25/2019    02:39  METHICILLIN RESISTANT STAPHYLOCOCCUS AUREUS  ID consult required at [facility] and [facility].  For susceptibility see order # [id number]   No growth after 5 days. No growth after 5 days. No growth after 5 days.  No growth after 5 days.     CBC:   Recent Labs   Lab 02/25/19  0353 02/26/19  0419   WBC 11.04 12.23   HGB 10.1* 10.7*   HCT 33.4* 35.8*    209     CMP:   Recent Labs   Lab 02/25/19  0353 02/26/19  0419    145   K 3.2* 3.3*    99   CO2 33* 40*   GLU 86 145*   BUN 19 19   CREATININE 1.0 0.9   CALCIUM 8.8 9.2   PROT 5.5* 5.6*   ALBUMIN 1.9* 1.9*   BILITOT 8.8* 11.4*   ALKPHOS 2,766* 4,037*   * 741*   ALT 74* 114*   ANIONGAP 8 6*   EGFRNONAA >60.0 >60.0     Urine Studies:   Recent Labs   Lab 09/29/18  0529  02/19/19  1935   COLORU Straw   < > Amanda   APPEARANCEUA Clear   < > Cloudy*   PHUR 6.0   < > 5.0   SPECGRAV 1.025   < > >=1.030*   PROTEINUA Negative   < > 1+*   GLUCUA 3+*   < > Negative   KETONESU Negative   < > Negative   BILIRUBINUA Negative   < > 2+*   OCCULTUA 1+*   < > 1+*   NITRITE Negative   < > Negative   UROBILINOGEN Negative  --   --     LEUKOCYTESUR Negative   < > 2+*   RBCUA 0   < > 8*   WBCUA 1   < > 39*   BACTERIA Rare   < > Many*   SQUAMEPITHEL 0   < > 3   HYALINECASTS  --    < > 10*    < > = values in this interval not displayed.     Wound Culture:   Recent Labs   Lab 09/05/18  1246 09/10/18  1817 01/25/19  1000 01/25/19  1002   LABAERO METHICILLIN RESISTANT STAPHYLOCOCCUS AUREUS  Few    METHICILLIN RESISTANT STAPHYLOCOCCUS AUREUS  Moderate   METHICILLIN RESISTANT STAPHYLOCOCCUS AUREUS  Moderate  Skin david also present   No growth No growth     All pertinent labs within the past 24 hours have been reviewed.    Significant Imaging: I have reviewed all pertinent imaging results/findings within the past 24 hours.

## 2019-02-26 NOTE — PLAN OF CARE
Problem: Occupational Therapy Goal  Goal: Occupational Therapy Goal  Goals to be met by: 3/12/19    Patient will increase functional independence with ADLs by performing:    Feeding with Set-up Assistance.  UE Dressing with Set-up Assistance.  LE Dressing with Moderate Assistance with AD as needed.  Grooming while seated with Set-up Assistance.  Toileting from bedside commode with Maximum Assistance for hygiene and clothing management.   Toilet transfer to bedside commode with Maximum Assistance.  Sitting EOB ~10 min with min A       Outcome: Ongoing (interventions implemented as appropriate)  Re-eval completed, new goals established    Comments: Initiate new OT POC     Chyna Dahl OT  2/26/2019

## 2019-02-26 NOTE — ASSESSMENT & PLAN NOTE
She was also found to have markedly elevated alk-phos, AST, and ALT.  Hepatology was consulted and felt acute liver injury related to infection versus medication, and not underlying liver disease. And ammonia was checked and was elevated, but hepatology recommended against treatment as they felt patient did not have hepatic encephalopathy.  Right upper quadrant ultrasound showed biliary sludge and gallbladder thickening but was negative for acute cholecystitis.  A subsequent HIDA scan was not consistent with acute cholecystitis; no surgical intervention needed per General surgery evaluation.    - re-consulted hepatology

## 2019-02-26 NOTE — ASSESSMENT & PLAN NOTE
She failed speech language therapy and was started on TPN with subsequent placement of IR guided G-tube in transition to tube feeds on 02/10.

## 2019-02-26 NOTE — PT/OT/SLP PROGRESS
"Physical Therapy Treatment    Patient Name:  Sheryl Martines   MRN:  [id number]    Recommendations:     Discharge Recommendations:  nursing facility, skilled   Discharge Equipment Recommendations: hospital bed   Barriers to discharge: Decreased caregiver support  Requires assist for mobility    Assessment:     Sheryl Martines is a 63 y.o. female admitted with a medical diagnosis of Acute metabolic encephalopathy.  She presents with the following impairments/functional limitations:  weakness, gait instability, impaired endurance, impaired balance, impaired functional mobilty, impaired self care skills, decreased safety awareness, impaired cardiopulmonary response to activity . Pt limited with functional mobility due to c/o pain in her buttocks     Rehab Prognosis: Fair; patient would benefit from acute skilled PT services to address these deficits and reach maximum level of function.    Recent Surgery: Procedure(s) (LRB):  LAPAROTOMY, EXPLORATORY (N/A)  INCISION AND DRAINAGE, ABSCESS-  drainage of pelvic abscess (N/A)  EXCISION, ABSCESS- drainage abdominal wall abscess (N/A)  APPLICATION, WOUND VAC (N/A) 32 Days Post-Op    Plan:     During this hospitalization, patient to be seen 3 x/week to address the identified rehab impairments via gait training, therapeutic activities, therapeutic exercises, neuromuscular re-education and progress toward the following goals:    · Plan of Care Expires:  03/24/19    Subjective   "I am not going to slide off, just let me go" (pt leaning posterior and to the R causing her to slide forward on the bed)    Pain/Comfort:  · Pain Rating 1: 8/10  · Location - Orientation 1: generalized  · Location 1: sacral spine("butt")  · Pain Addressed 1: Pre-medicate for activity, Reposition, Cessation of Activity  · Pain Rating Post-Intervention 1: 8/10    Objective:     Communicated with nurse prior to session.  Patient found all lines intact and call button in reach telemetry, bowel management " system, blood pressure cuff, pressure relief boots, nieves catheter(G tube)  upon PT entry to room.     General Precautions: Standard, fall, NPO, aspiration   Orthopedic Precautions:N/A   Braces: N/A     Functional Mobility:  · Bed Mobility:     · Rolling Left:  total assistance  · Supine to Sit: total assistance  · Sit to Supine: total assistance  · Transfers:     · Sit to Stand:  total assistance with hand-held assist x 3 trials for ~ 10-15 sec each trial    AM-PAC 6 CLICK MOBILITY  Turning over in bed (including adjusting bedclothes, sheets and blankets)?: 2  Sitting down on and standing up from a chair with arms (e.g., wheelchair, bedside commode, etc.): 2  Moving from lying on back to sitting on the side of the bed?: 2  Moving to and from a bed to a chair (including a wheelchair)?: 1  Need to walk in hospital room?: 1  Climbing 3-5 steps with a railing?: 1  Basic Mobility Total Score: 9       Therapeutic Activities and Exercises:   Pt sat on the EOB 20 min with CGA to total assist between standing trials. Pt leans to the R and posterior causing pt to slide forward on the bed requiring PT to block her knees. Pt repositioned with total assist and pt would lean back and slide forward again. Pt performed AAROM exs in supine x 10 reps: hip/knee flex/ext and ankle pumps.     Patient left HOB elevated with all lines intact, call button in reach and nurse notified..    GOALS:   Multidisciplinary Problems     Physical Therapy Goals        Problem: Physical Therapy Goal    Goal Priority Disciplines Outcome Goal Variances Interventions   Physical Therapy Goal     PT, PT/OT Ongoing (interventions implemented as appropriate)     Description:  Goals to be met by:3/8/19    Patient will increase functional independence with mobility by performin. Supine to sit with Moderate Assistance -not met  2. Sit to stand transfer with Maximum Assistance -not met  3. Bed to chair transfer with Maximum Assistance -not met  4. Sitting  at edge of bed x10 minutes with Contact Guard Assistance - not met  5. Lower extremity exercise program x10 reps, with assistance as needed - not met                          Time Tracking:     PT Received On: 02/26/19  PT Start Time: 0744     PT Stop Time: 0808  PT Total Time (min): 24 min     Billable Minutes: Therapeutic Activity 10 and Therapeutic Exercise 14    Treatment Type: Treatment  PT/PTA: PT     PTA Visit Number: 0     Clarissa Salvador, PT  02/26/2019

## 2019-02-26 NOTE — PLAN OF CARE
Problem: Adult Inpatient Plan of Care  Goal: Plan of Care Review  Outcome: Ongoing (interventions implemented as appropriate)  POC discussed with pt. And verbalized understanding. Pt. Oriented to person and place, VS stable, 1 L O2 NC, Telemetry with normal sinus. MD with staples and SURESH, skin tears to abd with mepilex, pressure ulcer to scarum also with a mepilex. Rectal tube in place draining dark brown liquid, nieves in place draining dark bert urine. R subclavian port 20G, L arm midline catheter. G tube with continuous tube feeds at 45ml/hr, pt tolerating well; NPO.  Remains free of falls and injury. Side rails up x3, wedge in place,  bed in lowest locked position, will continue to monitor.

## 2019-02-26 NOTE — ASSESSMENT & PLAN NOTE
Due to recent Pelvic abscess in female  CT abdomen showed a 4 x 3 cm enhancing fluid collection consistent with abscess.  She also had elevated lactate of 6.8.  She was admitted to the STICU service for initial management. Patient taken to OR on 01/25 for ex lap with drainage of abscess.  Postoperative course complicated by septic shock secondary to MRSA bacteremia for which she received 2 weeks of IV vancomycin and briefly required pressors, which have now been weaned off.  Wound culture also grew Bacteroides and patient completed 4 weeks of Cipro and Flagyl.  Patient remained intubated postoperatively and was successfully extubated on 01/28.  Initially, she was stabilized and transferred to the floor.     On the floor, patient was making some progress and being followed by [facility]; however, a rapid response was called on 02/19 due to somnolence.  ABG was consistent with acute hypercapnic respiratory failure, and she was placed on BiPAP and transferred back to SICU.  Infectious workup was significant for a UA with 39 WBCs, moderate bacteria, and moderate yeast; urine culture ultimately grew Candida for which she was started on Diflucan IV.    - new ID consult for antibiotic recommendations.

## 2019-02-26 NOTE — PROGRESS NOTES
"[facility] Hospital Medicine  Progress Note    Patient Name: Sheryl Martines  MRN: [id number]  Patient Class: IP- Inpatient   Admission Date: 1/23/2019  Length of Stay: 33 days  Attending Physician: Aaron Lott MD  Primary Care Provider: Primary Doctor [facility] Medicine Team: [facility] [place] [place] HOSP MED A Aaron Lott MD    Subjective:     Principal Problem:Acute metabolic encephalopathy    HPI:  Sheryl Martines is a 62 yo female with medical history of asthma, COPD, CAD, h/o T2DM, hypertension, CVA 2012 with R sided deficits, HFpEF, severe debility, and lap converted to open appendectomy 8/2018 c/b C diff, FTT, and a round infection presents to the ED for acute on chronic increased generalized abdominal pain with associated NBNB emesis, nausea and decreased appetite.   Abd pain is normally 5/10 but today is 7/10.  Pain is described as a"ache" and is non-radiating, non-exertional and unrelated to PO intake. These symptoms are waxing and waning since 8/2018 and usually takes zofran for sxs.  Pt states she has had decreased appetite over the past few weeks, intermittently able to keep down solid food, but usually only able to eat things like chicken broth.    She came to ED today since she was weak described as "feeling bad".  Patient states last bowel movement was prior to admission, no diarrhea.  Patient's family unsure if patient has any recent fevers, but patient denies any f/c/night sweats.  Patient denies any vision changes, dizziness, LH, changes in urine output, dysuria, chest pain or shortness of breath.      In ED, HR elevated to 90-110s, BP stable, 100% on RA and afebrile. No leukocytosis but CRP 78 (ESR 17). T bili 1.7 (BL .7 to 1.1).  Alk phos 400s (BL).  Lipase normal, liver enzymes unremarkable. Ua shows 3 hyaline casts, 2+ protein, 1+ ketones, 1+ bilirubin.  Given reglan, zofran, protonix and duonebs in the ED for sxs.  CT A/P with contrast shows "Posterior right hemipelvis rim " "enhancing fluid collection, suspicious for abscess.  Possible associated retained appendicolith.  Adjacent urinary bladder wall thickening, suspicious for superimposed cystitis."  Gen surgery consulted and recs given. CT shows stability in size compared to CT on 9/30/18 and actually has decreased in size.  No emergent intervention recommended.     Hospital Course:  See problem list    Interval History:   Symptoms:   Same.  Diet:  Tolerating TF  Activity level:  Impaired due to weakness.    Pain:  No pain.       Review of Systems   Constitutional: Negative for fever.   Respiratory: Negative for shortness of breath.    Cardiovascular: Negative for chest pain.   Gastrointestinal: Negative for abdominal pain.     Objective:     Vital Signs (Most Recent):  Temp: 97.9 °F (36.6 °C) (02/26/19 1130)  Pulse: (!) 113 (02/26/19 1600)  Resp: 20 (02/26/19 1600)  BP: (!) 131/95 (02/26/19 1130)  SpO2: 100 % (02/26/19 1600) Vital Signs (24h Range):  Temp:  [97 °F (36.1 °C)-98.3 °F (36.8 °C)] 97.9 °F (36.6 °C)  Pulse:  [103-115] 113  Resp:  [16-20] 20  SpO2:  [97 %-100 %] 100 %  BP: (131-148)/(86-95) 131/95     Weight: 87.1 kg (192 lb)  Body mass index is 35.12 kg/m².    Intake/Output Summary (Last 24 hours) at 2/26/2019 1750  Last data filed at 2/26/2019 1500  Gross per 24 hour   Intake 962 ml   Output 6145 ml   Net -5183 ml      Physical Exam   Constitutional: No distress.   Neck: No JVD present.   Cardiovascular: Normal rate, regular rhythm and normal heart sounds.   Pulmonary/Chest: Effort normal. No respiratory distress. She has wheezes. She has rales.   Abdominal: Soft. Bowel sounds are normal. She exhibits no distension.   Musculoskeletal: She exhibits edema (2+).   Neurological: She is alert.       MELD-Na score: 18 at 2/26/2019 12:39 PM  MELD score: 18 at 2/26/2019 12:39 PM  Calculated from:  Serum Creatinine: 0.9 mg/dL (Rounded to 1 mg/dL) at 2/26/2019  4:19 AM  Serum Sodium: 145 mmol/L (Rounded to 137 mmol/L) at 2/26/2019  " 4:19 AM  Total Bilirubin: 11.4 mg/dL at 2/26/2019  4:19 AM  INR(ratio): 1.2 at 2/26/2019 12:39 PM  Age: 63 years    Significant Labs:  CBC:  Recent Labs   Lab 02/25/19  0353 02/26/19  0419   WBC 11.04 12.23   HGB 10.1* 10.7*   HCT 33.4* 35.8*    209     CMP:  Recent Labs   Lab 02/25/19  0353 02/26/19  0419    145   K 3.2* 3.3*    99   CO2 33* 40*   GLU 86 145*   BUN 19 19   CREATININE 1.0 0.9   CALCIUM 8.8 9.2   PROT 5.5* 5.6*   ALBUMIN 1.9* 1.9*   BILITOT 8.8* 11.4*   ALKPHOS 2,766* 4,037*   * 741*   ALT 74* 114*   ANIONGAP 8 6*   EGFRNONAA >60.0 >60.0     PTINR:  Recent Labs   Lab 02/26/19  1239   INR 1.2           Assessment/Plan:      Generalized abdominal pain    Due to recent Pelvic abscess in female  CT abdomen showed a 4 x 3 cm enhancing fluid collection consistent with abscess.  She also had elevated lactate of 6.8.  She was admitted to the STICU service for initial management. Patient taken to OR on 01/25 for ex lap with drainage of abscess.  Postoperative course complicated by septic shock secondary to MRSA bacteremia for which she received 2 weeks of IV vancomycin and briefly required pressors, which have now been weaned off.  Wound culture also grew Bacteroides and patient completed 4 weeks of Cipro and Flagyl.  Patient remained intubated postoperatively and was successfully extubated on 01/28.  Initially, she was stabilized and transferred to the floor.     On the floor, patient was making some progress and being followed by [facility]; however, a rapid response was called on 02/19 due to somnolence.  ABG was consistent with acute hypercapnic respiratory failure, and she was placed on BiPAP and transferred back to SICU.  Infectious workup was significant for a UA with 39 WBCs, moderate bacteria, and moderate yeast; urine culture ultimately grew Candida for which she was started on Diflucan IV.    - new ID consult for antibiotic recommendations.         Dysphagia    She failed  speech language therapy and was started on TPN with subsequent placement of IR guided G-tube in transition to tube feeds on 02/10.        Elevated liver enzymes    She was also found to have markedly elevated alk-phos, AST, and ALT.  Hepatology was consulted and felt acute liver injury related to infection versus medication, and not underlying liver disease. And ammonia was checked and was elevated, but hepatology recommended against treatment as they felt patient did not have hepatic encephalopathy.  Right upper quadrant ultrasound showed biliary sludge and gallbladder thickening but was negative for acute cholecystitis.  A subsequent HIDA scan was not consistent with acute cholecystitis; no surgical intervention needed per General surgery evaluation.    - re-consulted hepatology       Acute on chronic diastolic (congestive) heart failure    Patient's mental status improved in the ICU and is now on nasal cannula oxygen and BiPAP at night.  Medicine initially consulted for assistance with management and noted patient to be markedly fluid overloaded, estimated to be approximately net positive 16 L with this admission.  She had elevated CVP and TTE was consistent volume overload as well as her BNP of > 4900.    - IV diuresis with Lasix with improvement in urine output.       Debility    Mostly wheelchair bound, but able to work with [facility] using walker  - Long-term acute care facility -abdominal wound requiring dressing, needs intensive PT, on tube feeds requiring extensive SLP.     Type 2 diabetes mellitus without complication, without long-term current use of insulin    -- acceptable; continue with current treatment & monitor        Severe malnutrition    - dietary consulted and appreciate recs; may require temporary TPN  - albumin 2.3 (chronic); will check prealbumin     Moderate asthma without complication    - c/w daily breo, theophylline, PRN duonebs     Coronary artery disease involving native coronary  artery of native heart without angina pectoris    - c/w ASA, plavix, statin  - no anginal equivalent       VTE Risk Mitigation (From admission, onward)        Ordered     heparin (porcine) injection 5,000 Units  Every 8 hours      02/18/19 0907     IP VTE HIGH RISK PATIENT  Once      01/25/19 1049     Place sequential compression device  Until discontinued      01/24/19 0027              Aaron Lott MD  Department of Hospital Medicine   [facility]

## 2019-02-26 NOTE — PLAN OF CARE
Problem: SLP Goal  Goal: SLP Goal  Speech Language Pathology Goals  Goals expected to be met by 2/28/19  1. Pt will participate in further, instrumental assessment via MBSS   2. Educate Pt and family on S/S aspiration and aspiration precautions   3. Pt will complete dysphagia exercises x10 ea with good effort 90% of attempts to improve BOT coordination and pharyngeal strength/coordination.                Patient seen for continued swallow assessment and dysphagia therapy.     Danny Norman CF-SLP  Speech-Language Pathology  Pager: [phone]

## 2019-02-26 NOTE — PROGRESS NOTES
Spoke to MD on call about rectal tube not being d/c'd by day RN from 1303 order. Night RN is unsure why it was not done and wanted to clarify with MD to see if he knew a reason it was not done. MD on call said to leave tube in for the night and address with primary team in the AM. Will do and continue to monitor.

## 2019-02-26 NOTE — PROGRESS NOTES
"[facility]  Adult Nutrition  Progress Note    SUMMARY       Recommendations    Recommendation/Intervention:     1.Continue TF of Glucerna 1.5 at goal rate of 45 mL/hr to provide 1620 kcal, 89 gm protein, and 820 mL free fluid.               - If bolus desired, recommend TF of Glucerna 1.5 x 4.5 cans/day to provide 1602 kcal, 88 gm protein, and 810 mL free fluid.      2. If able to advance diet, ADAT to Diabetic with texture per SLP.     3. RD following.     Goals: Patient to meet > 85% EEN and EPN  Nutrition Goal Status: goal met  Communication of RD Recs: (POC)    Reason for Assessment    Reason For Assessment: RD follow-up  Diagnosis: infection/sepsis(pelvic abscess)  Relevant Medical History: COPD, CVA, DM, HTN, HLD  Interdisciplinary Rounds: attended  General Information Comments: S/p ex-lap with drainage of abscess. No family at bedside. Tolerating TF @ 45 mL/hr. Denies N/V/D/C. Pt states she is hungry and wants to eat. NFPE completed , , . Pt continues with muscle/fat wasting. Noted mild/moderate edema and wt gain since last RD visit. Pt with moderate malnutrition.   Nutrition Discharge Planning: unable to determine at this time    Nutrition Risk Screen    Nutrition Risk Screen: tube feeding or parenteral nutrition    Nutrition/Diet History    Typical Food/Fluid Intake: Per MD note, patient reported poor PO itnake PTA 2/2 abdominal pain and N/V.  Spiritual, Cultural Beliefs, Scientology Practices, Values that Affect Care: no  Factors Affecting Nutritional Intake: NPO    Anthropometrics    Temp: 97 °F (36.1 °C)  Height: 5' 2" (157.5 cm)  Height (inches): 62 in  Weight Method: Bed Scale  Weight: 87.1 kg (192 lb)  Weight (lb): 192 lb  Ideal Body Weight (IBW), Female: 110 lb  % Ideal Body Weight, Female (lb): 174.55 lb  BMI (Calculated): 35.2  BMI Grade: 25 - 29.9 - overweight  Usual Body Weight (UBW), k.6 kg(per chart review 2018)  % Usual Body Weight: 87.46  % Weight Change " From Usual Weight: -12.73 %     Lab/Procedures/Meds    Pertinent Labs Reviewed: reviewed  Pertinent Labs Comments: K 3.3, glucose 145, phos 2.3, alk phos 4037, T bili 11.4, ,   Pertinent Medications Reviewed: reviewed  Pertinent Medications Comments: aspirin, clopidogrel, docusate, lasix    Estimated/Assessed Needs    Weight Used For Calorie Calculations: 71.2 kg (156 lb 15.5 oz)  Energy Calorie Requirements (kcal): 1505 kcal/day  Energy Need Method: [place]-St Jeor(x 1.25)  Protein Requirements:  g/day(1.2-1.4 g/kg)  Weight Used For Protein Calculations: 71.2 kg (156 lb 15.5 oz)  Fluid Requirements (mL): 1 mL/kcal or per MD  Estimated Fluid Requirement Method: RDA Method  RDA Method (mL): 1505  CHO Requirement: 50% total kcal    Nutrition Prescription Ordered    Current Diet Order: NPO  Current Nutrition Support Formula Ordered: Glucerna 1.5  Current Nutrition Support Rate Ordered: 45 (ml)  Current Nutrition Support Frequency Ordered: mL/hr    Evaluation of Received Nutrient/Fluid Intake    Enteral Calories (kcal): 1620  Enteral Protein (gm): 89  Enteral (Free Water) Fluid (mL): 820  % Kcal Needs: 108  % Protein Needs: 100  Energy Calories Required: meeting needs  Protein Required: meeting needs  Fluid Required: (per MD)  Comments: LBM 2/25  Tolerance: tolerating  % Intake of Estimated Energy Needs: 75 - 100 %  % Meal Intake: NPO    Nutrition Risk    Level of Risk/Frequency of Follow-up: (f/u 1 x wk)     Assessment and Plan    Moderate malnutrition    Contributing Nutrition Diagnosis  Malnutrition in the context of Acute Illness/Injury    Related to (etiology):  Decreased PO intake 2/2 abdominal pain and N/V    Signs and Symptoms (as evidenced by):  Energy Intake: <75% of estimated energy requirement for several months per patient  Body Fat Depletion: mild and moderate depletion of orbitals and triceps   Muscle Mass Depletion: mild and moderate depletion of temples, clavicle region and lower  extremities   Weight Loss: 12.7% x 5 months   Fluid Accumulation: mild and moderate    Interventions/Recommendations (treatment strategy):  Collaboration and Referral of Nutrition Care   Enteral Nutrition     Nutrition Diagnosis Status:  Improving - receiving 100% of EEN/EPN via TF          Monitor and Evaluation    Food and Nutrient Intake: energy intake, enteral nutrition intake  Food and Nutrient Adminstration: enteral and parenteral nutrition administration  Anthropometric Measurements: weight, weight change  Biochemical Data, Medical Tests and Procedures: electrolyte and renal panel, gastrointestinal profile, glucose/endocrine profile, inflammatory profile  Nutrition-Focused Physical Findings: overall appearance     Malnutrition Assessment  Malnutrition Type: acute illness or injury              Orbital Region (Subcutaneous Fat Loss): mild depletion  Upper Arm Region (Subcutaneous Fat Loss): moderate depletion   Religious Region (Muscle Loss): moderate depletion  Clavicle Bone Region (Muscle Loss): mild depletion  Anterior Thigh Region (Muscle Loss): mild depletion   Edema (Fluid Accumulation): (mild/moderate)             Nutrition Follow-Up    RD Follow-up?: Yes

## 2019-02-26 NOTE — ASSESSMENT & PLAN NOTE
Patient's mental status improved in the ICU and is now on nasal cannula oxygen and BiPAP at night.  Medicine initially consulted for assistance with management and noted patient to be markedly fluid overloaded, estimated to be approximately net positive 16 L with this admission.  She had elevated CVP and TTE was consistent volume overload as well as her BNP of > 4900.   - IV diuresis with Lasix with improvement in urine output.

## 2019-02-26 NOTE — ASSESSMENT & PLAN NOTE
Pt is a 63 y.o. female with hx of asthma, COPD, CAD, DM, HTN, CVA 2012 with R side deficits, HFpEF, severe debility , lap converted to open appendectomy 8/2018 complicated by cdiff, failure to thrive and recurrent fluid collection presented to the ED with intractable n/v and abdominal pain. 1/24 CT shows fluid collection in R hemipelvis.    Patient now s/p exploratory laparotomy, washout, and pelvic abscess drainage on 1/25/19.   She was on vancomycin and zosyn on admission. Surgical cultures +bacteroides. Gram stain +GNR. She has been on ciprofloxacin and flagyl since surgery. (week 5). Her repeat CT scan without any fluid collections seen. We are consulted for abx recommendations.     Blood cx positive for MRSA on 1/24. Repeat NGTD. She completed 2 weeks of IV vancomycin 2/8/19.       Plan  1. D/c ciprofloxacin and flagyl as completed therapy for pelvic abscess. Repeat CT scan 2/10, 2/18 with resolution of pelvic abscess. Staples in place. Non tender, no purulent drainage or redness.   2. Recommend removal or exchange of nieves if able  3.  Elevated LFTs. U/s abdomen ?acalculous cholecystitis. HIDA scan negative. Monitor, recommend hepatology evaluation   3. Remained stable, afebrile. Will sign off. Please call if with questions.

## 2019-02-26 NOTE — ASSESSMENT & PLAN NOTE
Mostly wheelchair bound, but able to work with [facility] using walker  - Long-term acute care facility -abdominal wound requiring dressing, needs intensive PT, on tube feeds requiring extensive SLP.

## 2019-02-26 NOTE — PT/OT/SLP RE-EVAL
"Occupational Therapy   Re-evaluation    Name: Sheryl Martines  MRN: [id number]  Admitting Diagnosis:  Acute metabolic encephalopathy 32 Days Post-Op    Recommendations:     Discharge Recommendations: nursing facility, skilled  Discharge Equipment Recommendations:  hospital bed  Barriers to discharge:  Decreased caregiver support    Assessment:     Sheryl Martines is a 63 y.o. female with a medical diagnosis of Acute metabolic encephalopathy.  She presents with weakness impairing EOB balance and participation in self care tasks. Pt able to sit EOB with assist and complete 3 standing trials.  Performance deficits affecting function are weakness, impaired endurance, impaired balance, gait instability, decreased upper extremity function, decreased lower extremity function, impaired self care skills, impaired functional mobilty, pain.      Rehab Prognosis:  Fair; patient would benefit from acute skilled OT services to address these deficits and reach maximum level of function.       Plan:     Patient to be seen 3 x/week to address the above listed problems via self-care/home management, therapeutic activities, therapeutic exercises  · Plan of Care Expires: 03/28/19  · Plan of Care Reviewed with: patient    Subjective     Chief Complaint: pain  Patient/Family stated goals: return to PLOF  Communicated with: RN prior to session.  Pain/Comfort:  · Pain Rating 1: 8/10  · Location - Side 1: Bilateral  · Location - Orientation 1: generalized  · Location 1: sacral spine("butt")  · Pain Addressed 1: Pre-medicate for activity, Reposition, Distraction  · Pain Rating Post-Intervention 1: 8/10    Objective:     Communicated with: RN prior to session.  Patient found HOB elevated with: telemetry, bowel management system, nieves catheter, pressure relief boots, peripheral IV, oxygen(G-tube) upon OT entry to room.    General Precautions: Standard, fall, NPO, aspiration   Orthopedic Precautions:N/A   Braces: N/A     Occupational " Performance:    Bed Mobility:    · Patient completed Scooting/Bridging with total assistance  · Patient completed Supine to Sit with total assistance  · Patient completed Sit to Supine with total assistance   · Pt seated EOB with total A for balance 2/2 posterior lean; pt able to sit EOB with CGA if propped on R elbow    Functional Mobility/Transfers:  · Patient completed Sit <> Stand Transfer with total assistance with no assistive device x 3 trials  · Functional Mobility: NT    Activities of Daily Living:  · Grooming: minimum assistance to brush teeth and wash face while seated EOB    Cognitive/Visual Perceptual:  Cognitive/Psychosocial Skills:     -       Oriented to: Person, Place and Situation   -       Follows Commands/attention:Follows multistep  commands  -       Communication: clear/fluent  -       Memory: No Deficits noted  -       Safety awareness/insight to disability: intact   -       Mood/Affect/Coping skills/emotional control: Appropriate to situation  Visual/Perceptual:      -Intact vision/hearing    Physical Exam:  Balance:    -       poor sitting/standing balance  Postural examination/scapula alignment:    -       Rounded shoulders  -       Forward head  -       R lateral lean in sitting  Skin integrity: Visible skin intact  Edema:  Moderate RUE and RLE  Sensation:    -       Intact  Dominant hand:    -       R hand  Upper Extremity Range of Motion:     -       Right Upper Extremity: WFL except shoulder flexion to 60*  -       Left Upper Extremity: WFL except shoulder flexion to 60*  Upper Extremity Strength:    -       Right Upper Extremity: grossly 3+/5  -       Left Upper Extremity: grossly 3+/5   Strength:    -       Right Upper Extremity: 3+/5  -       Left Upper Extremity: 3+/5  Fine Motor Coordination:    -       Intact  Gross motor coordination:   WFL    AMPAC 6 Click:  AMPAC Total Score: 10    Treatment & Education:  DREW DE LA TORRE exercises 10 x 1 for elbow flex/ext and chest press while  supine with HOB elevated  Education:  Pt educated on role of OT/POC  Pt educated on importance of EOB/OOB activity  White board/communication board updated    Patient left HOB elevated with all lines intact, call button in reach and RN notified    GOALS:   Multidisciplinary Problems     Occupational Therapy Goals        Problem: Occupational Therapy Goal    Goal Priority Disciplines Outcome Interventions   Occupational Therapy Goal     OT, PT/OT Ongoing (interventions implemented as appropriate)    Description:  Goals to be met by: 3/12/19    Patient will increase functional independence with ADLs by performing:    Feeding with Set-up Assistance.  UE Dressing with Set-up Assistance.  LE Dressing with Moderate Assistance with AD as needed.  Grooming while seated with Set-up Assistance.  Toileting from bedside commode with Maximum Assistance for hygiene and clothing management.   Toilet transfer to bedside commode with Maximum Assistance.  Sitting EOB ~10 min with min A                         History:     Past Medical History:   Diagnosis Date    Abnormal LFTs 12/14/2018    Closed compression fracture of fourth lumbar vertebra     Closed fracture of right tibia and fibula 10/3/2018    COPD (chronic obstructive pulmonary disease)     home O2    Coronary artery disease     Diabetes mellitus     Fall 10/4/2018    Glaucoma     High cholesterol     Hypertension     Infected incision 9/20/2018    Iritis     Pulmonary embolus     S/P appendectomy 9/11/2018    Stroke     rt sided weakness.       Past Surgical History:   Procedure Laterality Date    ABDOMINAL SURGERY      APPENDECTOMY N/A 8/26/2018    Per[facility] OR    APPENDECTOMY, LAPAROSCOPIC---CONVERTED TO OPEN APPENDECTOMY @0950 N/A 8/26/2018    [facility] OR    APPLICATION, WOUND VAC N/A 1/25/2019    Per[facility] at [facility] OR [address] [place] [address]    BACK SURGERY       stimulator    CATARACT EXTRACTION      EXCISION, ABSCESS- drainage abdominal wall abscess N/A 1/25/2019    Performed [facility] OR 2ND FLR    HYSTERECTOMY      INCISION AND DRAINAGE, ABSCESS-  drainage of pelvic abscess N/A 1/25/2019    Performed by [facility] at [facility] OR 2ND FLR    LAPAROTOMY, EXPLORATORY N/A 1/25/2019    Performed by [facility] at [facility] OR 2ND FLR    TOTAL HIP ARTHROPLASTY Left     2008       Time Tracking:     OT Date of Treatment: 02/26/19  OT Start Time: 0744  OT Stop Time: 0808  OT Total Time (min): 24 min    Billable Minutes:Re-eval 14  Self Care/Home Management 10    Chyna Dahl, OT  2/26/2019

## 2019-02-27 PROBLEM — E83.42 HYPOMAGNESEMIA: Status: ACTIVE | Noted: 2019-02-27

## 2019-02-27 LAB
ALBUMIN SERPL BCP-MCNC: 1.7 G/DL
ALBUMIN SERPL BCP-MCNC: 1.7 G/DL
ALLENS TEST: ABNORMAL
ALP SERPL-CCNC: 4383 U/L
ALP SERPL-CCNC: 4454 U/L
ALT SERPL W/O P-5'-P-CCNC: 124 U/L
ALT SERPL W/O P-5'-P-CCNC: 128 U/L
ANION GAP SERPL CALC-SCNC: 10 MMOL/L
ANION GAP SERPL CALC-SCNC: 6 MMOL/L
AST SERPL-CCNC: 644 U/L
AST SERPL-CCNC: 698 U/L
BASOPHILS # BLD AUTO: 0.03 K/UL
BASOPHILS NFR BLD: 0.2 %
BILIRUB SERPL-MCNC: 12.6 MG/DL
BILIRUB SERPL-MCNC: 12.9 MG/DL
BNP SERPL-MCNC: 2856 PG/ML
BUN SERPL-MCNC: 20 MG/DL
BUN SERPL-MCNC: 20 MG/DL
CALCIUM SERPL-MCNC: 9 MG/DL
CALCIUM SERPL-MCNC: 9.1 MG/DL
CHLORIDE SERPL-SCNC: 94 MMOL/L
CHLORIDE SERPL-SCNC: 96 MMOL/L
CO2 SERPL-SCNC: 41 MMOL/L
CO2 SERPL-SCNC: 41 MMOL/L
CREAT SERPL-MCNC: 0.6 MG/DL
CREAT SERPL-MCNC: 0.8 MG/DL
DELSYS: ABNORMAL
DIFFERENTIAL METHOD: ABNORMAL
EOSINOPHIL # BLD AUTO: 0.4 K/UL
EOSINOPHIL NFR BLD: 3 %
ERYTHROCYTE [DISTWIDTH] IN BLOOD BY AUTOMATED COUNT: 21.2 %
ERYTHROCYTE [SEDIMENTATION RATE] IN BLOOD BY WESTERGREN METHOD: 20 MM/H
EST. GFR  (AFRICAN AMERICAN): >60 ML/MIN/1.73 M^2
EST. GFR  (AFRICAN AMERICAN): >60 ML/MIN/1.73 M^2
EST. GFR  (NON AFRICAN AMERICAN): >60 ML/MIN/1.73 M^2
EST. GFR  (NON AFRICAN AMERICAN): >60 ML/MIN/1.73 M^2
FIO2: 28
FLOW: 2
GLUCOSE SERPL-MCNC: 196 MG/DL
GLUCOSE SERPL-MCNC: 257 MG/DL
HCO3 UR-SCNC: 44.5 MMOL/L (ref 24–28)
HCT VFR BLD AUTO: 36.3 %
HGB BLD-MCNC: 10.6 G/DL
IMM GRANULOCYTES # BLD AUTO: 0.25 K/UL
IMM GRANULOCYTES NFR BLD AUTO: 1.8 %
INR PPP: 1.2
LYMPHOCYTES # BLD AUTO: 4.9 K/UL
LYMPHOCYTES NFR BLD: 35.3 %
MAGNESIUM SERPL-MCNC: 1.4 MG/DL
MAGNESIUM SERPL-MCNC: 2 MG/DL
MCH RBC QN AUTO: 29.8 PG
MCHC RBC AUTO-ENTMCNC: 29.2 G/DL
MCV RBC AUTO: 102 FL
MODE: ABNORMAL
MONOCYTES # BLD AUTO: 2 K/UL
MONOCYTES NFR BLD: 14.4 %
NEUTROPHILS # BLD AUTO: 6.3 K/UL
NEUTROPHILS NFR BLD: 45.3 %
NRBC BLD-RTO: 8 /100 WBC
PCO2 BLDA: 64.7 MMHG (ref 35–45)
PH SMN: 7.45 [PH] (ref 7.35–7.45)
PHOSPHATE SERPL-MCNC: 2.3 MG/DL
PHOSPHATE SERPL-MCNC: 3.4 MG/DL
PLATELET # BLD AUTO: 192 K/UL
PMV BLD AUTO: 12.6 FL
PO2 BLDA: 35 MMHG (ref 40–60)
POC BE: 20 MMOL/L
POC SATURATED O2: 67 % (ref 95–100)
POC TCO2: 46 MMOL/L (ref 24–29)
POCT GLUCOSE: 182 MG/DL (ref 70–110)
POCT GLUCOSE: 230 MG/DL (ref 70–110)
POCT GLUCOSE: 280 MG/DL (ref 70–110)
POTASSIUM SERPL-SCNC: 4.2 MMOL/L
POTASSIUM SERPL-SCNC: 4.7 MMOL/L
PROT SERPL-MCNC: 5.2 G/DL
PROT SERPL-MCNC: 5.4 G/DL
PROTHROMBIN TIME: 12.4 SEC
RBC # BLD AUTO: 3.56 M/UL
SAMPLE: ABNORMAL
SITE: ABNORMAL
SODIUM SERPL-SCNC: 143 MMOL/L
SODIUM SERPL-SCNC: 145 MMOL/L
SP02: 100
TROPONIN I SERPL DL<=0.01 NG/ML-MCNC: 0.23 NG/ML
WBC # BLD AUTO: 13.87 K/UL

## 2019-02-27 PROCEDURE — 25000242 PHARM REV CODE 250 ALT 637 W/ HCPCS: Performed by: STUDENT IN AN ORGANIZED HEALTH CARE EDUCATION/TRAINING PROGRAM

## 2019-02-27 PROCEDURE — 25000242 PHARM REV CODE 250 ALT 637 W/ HCPCS: Performed by: HOSPITALIST

## 2019-02-27 PROCEDURE — 94799 UNLISTED PULMONARY SVC/PX: CPT

## 2019-02-27 PROCEDURE — 84100 ASSAY OF PHOSPHORUS: CPT

## 2019-02-27 PROCEDURE — 99900035 HC TECH TIME PER 15 MIN (STAT)

## 2019-02-27 PROCEDURE — 25000003 PHARM REV CODE 250: Performed by: STUDENT IN AN ORGANIZED HEALTH CARE EDUCATION/TRAINING PROGRAM

## 2019-02-27 PROCEDURE — 84100 ASSAY OF PHOSPHORUS: CPT | Mod: 91

## 2019-02-27 PROCEDURE — 94664 DEMO&/EVAL PT USE INHALER: CPT

## 2019-02-27 PROCEDURE — 92526 ORAL FUNCTION THERAPY: CPT

## 2019-02-27 PROCEDURE — 82803 BLOOD GASES ANY COMBINATION: CPT

## 2019-02-27 PROCEDURE — 85025 COMPLETE CBC W/AUTO DIFF WBC: CPT

## 2019-02-27 PROCEDURE — 83735 ASSAY OF MAGNESIUM: CPT

## 2019-02-27 PROCEDURE — 99233 PR SUBSEQUENT HOSPITAL CARE,LEVL III: ICD-10-PCS | Mod: ,,, | Performed by: HOSPITALIST

## 2019-02-27 PROCEDURE — 83735 ASSAY OF MAGNESIUM: CPT | Mod: 91

## 2019-02-27 PROCEDURE — 27000646 HC AEROBIKA DEVICE

## 2019-02-27 PROCEDURE — 80053 COMPREHEN METABOLIC PANEL: CPT

## 2019-02-27 PROCEDURE — 94668 MNPJ CHEST WALL SBSQ: CPT

## 2019-02-27 PROCEDURE — 83880 ASSAY OF NATRIURETIC PEPTIDE: CPT

## 2019-02-27 PROCEDURE — 99233 SBSQ HOSP IP/OBS HIGH 50: CPT | Mod: GC,,, | Performed by: INTERNAL MEDICINE

## 2019-02-27 PROCEDURE — 93010 EKG 12-LEAD: ICD-10-PCS | Mod: 76,,, | Performed by: INTERNAL MEDICINE

## 2019-02-27 PROCEDURE — 63600175 PHARM REV CODE 636 W HCPCS: Performed by: HOSPITALIST

## 2019-02-27 PROCEDURE — 93010 ELECTROCARDIOGRAM REPORT: CPT | Mod: ,,, | Performed by: INTERNAL MEDICINE

## 2019-02-27 PROCEDURE — 25000003 PHARM REV CODE 250: Performed by: SURGERY

## 2019-02-27 PROCEDURE — 25000003 PHARM REV CODE 250: Performed by: HOSPITALIST

## 2019-02-27 PROCEDURE — 99233 SBSQ HOSP IP/OBS HIGH 50: CPT | Mod: ,,, | Performed by: HOSPITALIST

## 2019-02-27 PROCEDURE — 99233 PR SUBSEQUENT HOSPITAL CARE,LEVL III: ICD-10-PCS | Mod: GC,,, | Performed by: INTERNAL MEDICINE

## 2019-02-27 PROCEDURE — 94660 CPAP INITIATION&MGMT: CPT

## 2019-02-27 PROCEDURE — 36415 COLL VENOUS BLD VENIPUNCTURE: CPT

## 2019-02-27 PROCEDURE — 93005 ELECTROCARDIOGRAM TRACING: CPT

## 2019-02-27 PROCEDURE — 85610 PROTHROMBIN TIME: CPT

## 2019-02-27 PROCEDURE — 63600175 PHARM REV CODE 636 W HCPCS: Performed by: STUDENT IN AN ORGANIZED HEALTH CARE EDUCATION/TRAINING PROGRAM

## 2019-02-27 PROCEDURE — 80053 COMPREHEN METABOLIC PANEL: CPT | Mod: 91

## 2019-02-27 PROCEDURE — 20600001 HC STEP DOWN PRIVATE ROOM

## 2019-02-27 PROCEDURE — 27000221 HC OXYGEN, UP TO 24 HOURS

## 2019-02-27 PROCEDURE — 94640 AIRWAY INHALATION TREATMENT: CPT

## 2019-02-27 PROCEDURE — 84484 ASSAY OF TROPONIN QUANT: CPT

## 2019-02-27 PROCEDURE — 94761 N-INVAS EAR/PLS OXIMETRY MLT: CPT

## 2019-02-27 RX ORDER — LANOLIN ALCOHOL/MO/W.PET/CERES
400 CREAM (GRAM) TOPICAL 2 TIMES DAILY
Status: COMPLETED | OUTPATIENT
Start: 2019-02-27 | End: 2019-03-02

## 2019-02-27 RX ORDER — FUROSEMIDE 10 MG/ML
40 INJECTION INTRAMUSCULAR; INTRAVENOUS DAILY
Status: DISCONTINUED | OUTPATIENT
Start: 2019-02-28 | End: 2019-02-28

## 2019-02-27 RX ORDER — MAGNESIUM SULFATE HEPTAHYDRATE 40 MG/ML
2 INJECTION, SOLUTION INTRAVENOUS ONCE
Status: COMPLETED | OUTPATIENT
Start: 2019-02-27 | End: 2019-02-27

## 2019-02-27 RX ORDER — FLUTICASONE FUROATE AND VILANTEROL 100; 25 UG/1; UG/1
1 POWDER RESPIRATORY (INHALATION) DAILY
Status: DISCONTINUED | OUTPATIENT
Start: 2019-02-28 | End: 2019-03-16 | Stop reason: HOSPADM

## 2019-02-27 RX ORDER — URSODIOL 250 MG/1
500 TABLET, FILM COATED ORAL 2 TIMES DAILY
Status: DISCONTINUED | OUTPATIENT
Start: 2019-02-27 | End: 2019-03-16

## 2019-02-27 RX ORDER — PREDNISONE 10 MG/1
10 TABLET ORAL DAILY
Status: DISCONTINUED | OUTPATIENT
Start: 2019-02-28 | End: 2019-03-08

## 2019-02-27 RX ORDER — GLUCAGON 1 MG
1 KIT INJECTION
Status: DISCONTINUED | OUTPATIENT
Start: 2019-02-27 | End: 2019-03-16 | Stop reason: HOSPADM

## 2019-02-27 RX ORDER — INSULIN ASPART 100 [IU]/ML
1-10 INJECTION, SOLUTION INTRAVENOUS; SUBCUTANEOUS EVERY 4 HOURS PRN
Status: DISCONTINUED | OUTPATIENT
Start: 2019-02-27 | End: 2019-03-16 | Stop reason: HOSPADM

## 2019-02-27 RX ORDER — DULOXETIN HYDROCHLORIDE 60 MG/1
60 CAPSULE, DELAYED RELEASE ORAL DAILY
Status: DISCONTINUED | OUTPATIENT
Start: 2019-02-28 | End: 2019-03-16

## 2019-02-27 RX ORDER — URSODIOL 300 MG/1
600 CAPSULE ORAL 2 TIMES DAILY
Status: DISCONTINUED | OUTPATIENT
Start: 2019-02-27 | End: 2019-02-27

## 2019-02-27 RX ORDER — LACTULOSE 10 G/15ML
20 SOLUTION ORAL DAILY
Status: DISCONTINUED | OUTPATIENT
Start: 2019-02-27 | End: 2019-02-28

## 2019-02-27 RX ADMIN — FLUTICASONE FUROATE AND VILANTEROL TRIFENATATE 1 PUFF: 100; 25 POWDER RESPIRATORY (INHALATION) at 10:02

## 2019-02-27 RX ADMIN — HEPARIN SODIUM 5000 UNITS: 5000 INJECTION, SOLUTION INTRAVENOUS; SUBCUTANEOUS at 01:02

## 2019-02-27 RX ADMIN — HYDROMORPHONE HYDROCHLORIDE 0.5 MG: 1 INJECTION, SOLUTION INTRAMUSCULAR; INTRAVENOUS; SUBCUTANEOUS at 08:02

## 2019-02-27 RX ADMIN — Medication 400 MG: at 09:02

## 2019-02-27 RX ADMIN — POTASSIUM & SODIUM PHOSPHATES POWDER PACK 280-160-250 MG 1 PACKET: 280-160-250 PACK at 05:02

## 2019-02-27 RX ADMIN — IPRATROPIUM BROMIDE AND ALBUTEROL SULFATE 3 ML: .5; 3 SOLUTION RESPIRATORY (INHALATION) at 12:02

## 2019-02-27 RX ADMIN — INSULIN ASPART 1 UNITS: 100 INJECTION, SOLUTION INTRAVENOUS; SUBCUTANEOUS at 10:02

## 2019-02-27 RX ADMIN — POTASSIUM & SODIUM PHOSPHATES POWDER PACK 280-160-250 MG 1 PACKET: 280-160-250 PACK at 01:02

## 2019-02-27 RX ADMIN — MAGNESIUM SULFATE IN WATER 2 G: 40 INJECTION, SOLUTION INTRAVENOUS at 10:02

## 2019-02-27 RX ADMIN — IPRATROPIUM BROMIDE AND ALBUTEROL SULFATE 3 ML: .5; 3 SOLUTION RESPIRATORY (INHALATION) at 08:02

## 2019-02-27 RX ADMIN — URSODIOL 500 MG: 250 TABLET, FILM COATED ORAL at 08:02

## 2019-02-27 RX ADMIN — HYDROMORPHONE HYDROCHLORIDE 0.5 MG: 1 INJECTION, SOLUTION INTRAMUSCULAR; INTRAVENOUS; SUBCUTANEOUS at 10:02

## 2019-02-27 RX ADMIN — INSULIN ASPART 4 UNITS: 100 INJECTION, SOLUTION INTRAVENOUS; SUBCUTANEOUS at 05:02

## 2019-02-27 RX ADMIN — IPRATROPIUM BROMIDE AND ALBUTEROL SULFATE 3 ML: .5; 3 SOLUTION RESPIRATORY (INHALATION) at 07:02

## 2019-02-27 RX ADMIN — ASPIRIN 81 MG CHEWABLE TABLET 81 MG: 81 TABLET CHEWABLE at 10:02

## 2019-02-27 RX ADMIN — HYDROMORPHONE HYDROCHLORIDE 0.5 MG: 1 INJECTION, SOLUTION INTRAMUSCULAR; INTRAVENOUS; SUBCUTANEOUS at 05:02

## 2019-02-27 RX ADMIN — MICONAZOLE NITRATE: 20 OINTMENT TOPICAL at 08:02

## 2019-02-27 RX ADMIN — URSODIOL 600 MG: 300 CAPSULE ORAL at 10:02

## 2019-02-27 RX ADMIN — STANDARDIZED SENNA CONCENTRATE AND DOCUSATE SODIUM 1 TABLET: 8.6; 5 TABLET, FILM COATED ORAL at 10:02

## 2019-02-27 RX ADMIN — POTASSIUM & SODIUM PHOSPHATES POWDER PACK 280-160-250 MG 1 PACKET: 280-160-250 PACK at 10:02

## 2019-02-27 RX ADMIN — POTASSIUM & SODIUM PHOSPHATES POWDER PACK 280-160-250 MG 1 PACKET: 280-160-250 PACK at 08:02

## 2019-02-27 RX ADMIN — INSULIN ASPART 6 UNITS: 100 INJECTION, SOLUTION INTRAVENOUS; SUBCUTANEOUS at 12:02

## 2019-02-27 RX ADMIN — MICONAZOLE NITRATE: 20 OINTMENT TOPICAL at 10:02

## 2019-02-27 RX ADMIN — HYDROMORPHONE HYDROCHLORIDE 0.5 MG: 1 INJECTION, SOLUTION INTRAMUSCULAR; INTRAVENOUS; SUBCUTANEOUS at 03:02

## 2019-02-27 RX ADMIN — Medication 400 MG: at 10:02

## 2019-02-27 RX ADMIN — HEPARIN SODIUM 5000 UNITS: 5000 INJECTION, SOLUTION INTRAVENOUS; SUBCUTANEOUS at 06:02

## 2019-02-27 RX ADMIN — CLOPIDOGREL 75 MG: 75 TABLET, FILM COATED ORAL at 10:02

## 2019-02-27 RX ADMIN — HEPARIN SODIUM 5000 UNITS: 5000 INJECTION, SOLUTION INTRAVENOUS; SUBCUTANEOUS at 09:02

## 2019-02-27 RX ADMIN — PANTOPRAZOLE SODIUM 40 MG: 40 GRANULE, DELAYED RELEASE ORAL at 10:02

## 2019-02-27 RX ADMIN — LACTULOSE 20 G: 20 SOLUTION ORAL at 10:02

## 2019-02-27 NOTE — PROGRESS NOTES
"[facility] Hospital Medicine  Progress Note    Patient Name: Sheryl Martines  MRN: [id number]  Patient Class: IP- Inpatient   Admission Date: 1/23/2019  Length of Stay: 34 days  Attending Physician: Aaron Lott MD  Primary Care Provider: [facility] Medicine Team: [facility] Aaron Lott MD    Subjective:     Principal Problem:Acute metabolic encephalopathy    HPI:  Sheryl Martines is a 62 yo female with medical history of asthma, COPD, CAD, h/o T2DM, hypertension, CVA 2012 with R sided deficits, HFpEF, severe debility, and lap converted to open appendectomy 8/2018 c/b C diff, FTT, and a round infection presents to the ED for acute on chronic increased generalized abdominal pain with associated NBNB emesis, nausea and decreased appetite.   Abd pain is normally 5/10 but today is 7/10.  Pain is described as a"ache" and is non-radiating, non-exertional and unrelated to PO intake. These symptoms are waxing and waning since 8/2018 and usually takes zofran for sxs.  Pt states she has had decreased appetite over the past few weeks, intermittently able to keep down solid food, but usually only able to eat things like chicken broth.    She came to ED today since she was weak described as "feeling bad".  Patient states last bowel movement was prior to admission, no diarrhea.  Patient's family unsure if patient has any recent fevers, but patient denies any f/c/night sweats.  Patient denies any vision changes, dizziness, LH, changes in urine output, dysuria, chest pain or shortness of breath.      In ED, HR elevated to 90-110s, BP stable, 100% on RA and afebrile. No leukocytosis but CRP 78 (ESR 17). T bili 1.7 (BL .7 to 1.1).  Alk phos 400s (BL).  Lipase normal, liver enzymes unremarkable. Ua shows 3 hyaline casts, 2+ protein, 1+ ketones, 1+ bilirubin.  Given reglan, zofran, protonix and duonebs in the ED for sxs.  CT A/P with contrast shows "Posterior right hemipelvis rim " "enhancing fluid collection, suspicious for abscess.  Possible associated retained appendicolith.  Adjacent urinary bladder wall thickening, suspicious for superimposed cystitis."  Gen surgery consulted and recs given. CT shows stability in size compared to CT on 9/30/18 and actually has decreased in size.  No emergent intervention recommended.     Hospital Course:  See problem list    Interval History:   Symptoms:   Same.  Diet:  Tolerating TF  Activity level:  Impaired due to weakness.    Pain:  No pain.       Review of Systems   Constitutional: Negative for fever.   Respiratory: Negative for shortness of breath.    Cardiovascular: Negative for chest pain.   Gastrointestinal: Negative for abdominal pain.     Objective:     Vital Signs (Most Recent):  Temp: 98.5 °F (36.9 °C) (02/27/19 1229)  Pulse: (!) 114 (02/27/19 1534)  Resp: 18 (02/27/19 1241)  BP: 121/78 (02/27/19 1229)  SpO2: 100 % (02/27/19 1241) Vital Signs (24h Range):  Temp:  [97.9 °F (36.6 °C)-100 °F (37.8 °C)] 98.5 °F (36.9 °C)  Pulse:  [] 114  Resp:  [16-22] 18  SpO2:  [95 %-100 %] 100 %  BP: (113-141)/(63-89) 121/78     Weight: 87.1 kg (192 lb)  Body mass index is 35.12 kg/m².    Intake/Output Summary (Last 24 hours) at 2/27/2019 1549  Last data filed at 2/27/2019 1100  Gross per 24 hour   Intake 2075 ml   Output 3925 ml   Net -1850 ml      Physical Exam   Constitutional: No distress.   Neck: No JVD present.   Cardiovascular: Normal rate, regular rhythm and normal heart sounds.   Pulmonary/Chest: Effort normal. No respiratory distress. She has wheezes. She has rales.   Abdominal: Soft. Bowel sounds are normal. She exhibits no distension.   Musculoskeletal: She exhibits edema (2+).   Neurological: She is alert.       MELD-Na score: 18 at 2/27/2019  3:24 AM  MELD score: 18 at 2/27/2019  3:24 AM  Calculated from:  Serum Creatinine: 0.8 mg/dL (Rounded to 1 mg/dL) at 2/27/2019  3:24 AM  Serum Sodium: 145 mmol/L (Rounded to 137 mmol/L) at 2/27/2019  " 3:24 AM  Total Bilirubin: 12.6 mg/dL at 2/27/2019  3:24 AM  INR(ratio): 1.2 at 2/27/2019  3:24 AM  Age: 63 years    Significant Labs:  CBC:  Recent Labs   Lab 02/26/19  0419 02/27/19  0324   WBC 12.23 13.87*   HGB 10.7* 10.6*   HCT 35.8* 36.3*    192     CMP:  Recent Labs   Lab 02/26/19  0419 02/27/19  0324    145   K 3.3* 4.2   CL 99 94*   CO2 40* 41*   * 257*   BUN 19 20   CREATININE 0.9 0.8   CALCIUM 9.2 9.1   PROT 5.6* 5.2*   ALBUMIN 1.9* 1.7*   BILITOT 11.4* 12.6*   ALKPHOS 4,037* 4,454*   * 698*   * 128*   ANIONGAP 6* 10   EGFRNONAA >60.0 >60.0     PTINR:  Recent Labs   Lab 02/26/19  1239 02/27/19  0324   INR 1.2 1.2           Assessment/Plan:      Generalized abdominal pain    Due to recent Pelvic abscess in female  CT abdomen showed a 4 x 3 cm enhancing fluid collection consistent with abscess.  She also had elevated lactate of 6.8.  She was admitted to the STICU service for initial management. Patient taken to OR on 01/25 for ex lap with drainage of abscess.  Postoperative course complicated by septic shock secondary to MRSA bacteremia for which she received 2 weeks of IV vancomycin and briefly required pressors, which have now been weaned off.  Wound culture also grew Bacteroides and patient completed 4 weeks of Cipro and Flagyl.  Patient remained intubated postoperatively and was successfully extubated on 01/28.  Initially, she was stabilized and transferred to the floor.     On the floor, patient was making some progress and being followed by [facility]; however, a rapid response was called on 02/19 due to somnolence.  ABG was consistent with acute hypercapnic respiratory failure, and she was placed on BiPAP and transferred back to SICU.  Infectious workup was significant for a UA with 39 WBCs, moderate bacteria, and moderate yeast; urine culture ultimately grew Candida for which she was started on Diflucan IV.    - 2/26 ID consult recommended no antibiotics .    -  monitor       Acute on chronic diastolic (congestive) heart failure    Patient's mental status improved in the ICU and is now on nasal cannula oxygen and BiPAP at night.  Medicine initially consulted for assistance with management and noted patient to be markedly fluid overloaded, estimated to be approximately net positive 16 L with this admission.  She had elevated CVP and TTE was consistent volume overload as well as her BNP of > 4900.    - IV diuresis with Lasix with improvement in urine output.       Dysphagia    She failed speech language therapy and was started on TPN with subsequent placement of IR guided G-tube in transition to tube feeds on 02/10.        Elevated liver enzymes    She was also found to have markedly elevated alk-phos, AST, and ALT.  Hepatology was consulted and felt acute liver injury related to infection versus medication, and not underlying liver disease. And ammonia was checked and was elevated, but hepatology recommended against treatment as they felt patient did not have hepatic encephalopathy.  Right upper quadrant ultrasound showed biliary sludge and gallbladder thickening but was negative for acute cholecystitis.  A subsequent HIDA scan was not consistent with acute cholecystitis; no surgical intervention needed per General surgery evaluation.    - re-consulted hepatology 2/27 who thought this could be drug induced.  DO not use cipro.  Recommend a trial of ursodiol 600mg BID to help with cholestasis.  If not better in two weeks may need biopsy.        Bradycardia    Around 2/22 pt was in junctional augusto. Cards saw pt and recommended that rate control meds (diltiazem) be held.  Augusto resolved.   - monitor rate             Debility    Mostly wheelchair bound, but able to work with [facility] using walker  - Long-term acute care facility -abdominal wound requiring dressing, needs intensive PT, on tube feeds requiring extensive SLP.     Type 2 diabetes mellitus without complication,  without long-term current use of insulin    -- acceptable; continue with current treatment & monitor        Hypomagnesemia    Replace prn       Severe malnutrition    - dietary consulted and appreciate recs; may require temporary TPN  - albumin 2.3 (chronic); will check prealbumin     Moderate asthma without complication    - c/w daily breo, theophylline, PRN duonebs     Coronary artery disease involving native coronary artery of native heart without angina pectoris    - c/w ASA, plavix, statin  - no anginal equivalent       VTE Risk Mitigation (From admission, onward)        Ordered     heparin (porcine) injection 5,000 Units  Every 8 hours      02/18/19 0907     IP VTE HIGH RISK PATIENT  Once      01/25/19 1049     Place sequential compression device  Until discontinued      01/24/19 0027              Aaron Lott MD  Department of Hospital Medicine   [facility]

## 2019-02-27 NOTE — SUBJECTIVE & OBJECTIVE
Interval History:   Patient clinically improved, and now stepped down to the floor.  She appears improved compared to our prior evaluation  Hepatology consulted as he TBili continued to trend up with TBili of 12, and ALKP of 4400.  Patient states that she is feeling well today. She appears lethargic, and bedbound.  On IV diuresis for congestion.    Current Facility-Administered Medications   Medication    albuterol-ipratropium 2.5 mg-0.5 mg/3 mL nebulizer solution 3 mL    albuterol-ipratropium 2.5 mg-0.5 mg/3 mL nebulizer solution 3 mL    aspirin chewable tablet 81 mg    clopidogrel tablet 75 mg    dextrose 50% injection 12.5 g    diclofenac sodium 1 % gel 2 g    diphenhydrAMINE injection 12.5 mg    fluticasone-vilanterol 100-25 mcg/dose diskus inhaler 1 puff    [START ON 2/28/2019] furosemide injection 40 mg    glucagon (human recombinant) injection 1 mg    heparin (porcine) injection 5,000 Units    HYDROmorphone injection 0.5 mg    insulin aspart U-100 pen 1-10 Units    lactulose 20 gram/30 mL solution Soln 20 g    magnesium oxide tablet 400 mg    miconazole nitrate 2% ointment    ondansetron injection 4 mg    pantoprazole suspension 40 mg    potassium, sodium phosphates 280-160-250 mg packet 1 packet    senna-docusate 8.6-50 mg per tablet 1 tablet    sodium chloride 0.9% flush 5 mL    ursodiol capsule 600 mg       Objective:     Vital Signs (Most Recent):  Temp: 98.5 °F (36.9 °C) (02/27/19 1229)  Pulse: 90 (02/27/19 1241)  Resp: 18 (02/27/19 1241)  BP: 121/78 (02/27/19 1229)  SpO2: 100 % (02/27/19 1241) Vital Signs (24h Range):  Temp:  [97.9 °F (36.6 °C)-100 °F (37.8 °C)] 98.5 °F (36.9 °C)  Pulse:  [] 90  Resp:  [16-22] 18  SpO2:  [95 %-100 %] 100 %  BP: (113-141)/(63-89) 121/78     Weight: 87.1 kg (192 lb) (02/22/19 1045)  Body mass index is 35.12 kg/m².    Physical Exam   Constitutional: She appears lethargic. No distress.   HENT:   Head: Normocephalic.   Eyes: Conjunctivae are  normal. Scleral icterus is present.   Neck: Normal range of motion. Neck supple.   Cardiovascular: Tachycardia present.   Pulmonary/Chest: Breath sounds normal. She is in respiratory distress (mild).   Abdominal: Soft. Bowel sounds are normal. She exhibits distension. She exhibits no mass. There is no tenderness. There is no rebound and no guarding.   PEG tube in place.  Healing surgical scar.   Musculoskeletal: Normal range of motion.   Neurological: She appears lethargic.   Skin: Skin is warm and dry. There is pallor.   Psychiatric: She has a normal mood and affect.       MELD-Na score: 18 at 2/27/2019  3:24 AM  MELD score: 18 at 2/27/2019  3:24 AM  Calculated from:  Serum Creatinine: 0.8 mg/dL (Rounded to 1 mg/dL) at 2/27/2019  3:24 AM  Serum Sodium: 145 mmol/L (Rounded to 137 mmol/L) at 2/27/2019  3:24 AM  Total Bilirubin: 12.6 mg/dL at 2/27/2019  3:24 AM  INR(ratio): 1.2 at 2/27/2019  3:24 AM  Age: 63 years    Significant Labs:  CBC:   Recent Labs   Lab 02/27/19 0324   WBC 13.87*   RBC 3.56*   HGB 10.6*   HCT 36.3*        BMP:   Recent Labs   Lab 02/27/19 0324   *      K 4.2   CL 94*   CO2 41*   BUN 20   CREATININE 0.8   CALCIUM 9.1     CMP:   Recent Labs   Lab 02/27/19  0324   *   CALCIUM 9.1   ALBUMIN 1.7*   PROT 5.2*      K 4.2   CO2 41*   CL 94*   BUN 20   CREATININE 0.8   ALKPHOS 4,454*   *   *   BILITOT 12.6*     Coagulation:   Recent Labs   Lab 02/27/19  0324   INR 1.2       Significant Imaging:  Labs: Reviewed  US: Reviewed  CT: Reviewed

## 2019-02-27 NOTE — PLAN OF CARE
Problem: Adult Inpatient Plan of Care  Goal: Plan of Care Review  Outcome: Ongoing (interventions implemented as appropriate)  POC discussed with pt and verbalized understanding. Oriented to person and place, VS stable, HR ran tachy MD aware; NC on 0.5L. MD incisions SURESH with no signs of infection, skin tear to abd with mepliex in place, meplix to sacrum for preventive measures. G-tube in place with tube feeds infusing at 45mls/hr., NPO.  Rectal tube in place, draining light brown liquid stool, Ritchie in place draining dark yellow urine. Remains free of falls and injury. Side rails up x3 for safety, bed in lowest locked position, will continue to monitor.

## 2019-02-27 NOTE — ASSESSMENT & PLAN NOTE
Due to recent Pelvic abscess in female  CT abdomen showed a 4 x 3 cm enhancing fluid collection consistent with abscess.  She also had elevated lactate of 6.8.  She was admitted to the STICU service for initial management. Patient taken to OR on 01/25 for ex lap with drainage of abscess.  Postoperative course complicated by septic shock secondary to MRSA bacteremia for which she received 2 weeks of IV vancomycin and briefly required pressors, which have now been weaned off.  Wound culture also grew Bacteroides and patient completed 4 weeks of Cipro and Flagyl.  Patient remained intubated postoperatively and was successfully extubated on 01/28.  Initially, she was stabilized and transferred to the floor.     On the floor, patient was making some progress and being followed by [facility]; however, a rapid response was called on 02/19 due to somnolence.  ABG was consistent with acute hypercapnic respiratory failure, and she was placed on BiPAP and transferred back to SICU.  Infectious workup was significant for a UA with 39 WBCs, moderate bacteria, and moderate yeast; urine culture ultimately grew Candida for which she was started on Diflucan IV.    - 2/26 ID consult recommended no antibiotics .    - monitor

## 2019-02-27 NOTE — ASSESSMENT & PLAN NOTE
She was also found to have markedly elevated alk-phos, AST, and ALT.  Hepatology was consulted and felt acute liver injury related to infection versus medication, and not underlying liver disease. And ammonia was checked and was elevated, but hepatology recommended against treatment as they felt patient did not have hepatic encephalopathy.  Right upper quadrant ultrasound showed biliary sludge and gallbladder thickening but was negative for acute cholecystitis.  A subsequent HIDA scan was not consistent with acute cholecystitis; no surgical intervention needed per General surgery evaluation.    - re-consulted hepatology 2/27 who thought this could be drug induced.  DO not use cipro.  Recommend a trial of ursodiol 600mg BID to help with cholestasis.  If not better in two weeks may need biopsy.

## 2019-02-27 NOTE — SUBJECTIVE & OBJECTIVE
Interval History:   Symptoms:   Same.  Diet:  Tolerating TF  Activity level:  Impaired due to weakness.    Pain:  No pain.       Review of Systems   Constitutional: Negative for fever.   Respiratory: Negative for shortness of breath.    Cardiovascular: Negative for chest pain.   Gastrointestinal: Negative for abdominal pain.     Objective:     Vital Signs (Most Recent):  Temp: 98.5 °F (36.9 °C) (02/27/19 1229)  Pulse: (!) 114 (02/27/19 1534)  Resp: 18 (02/27/19 1241)  BP: 121/78 (02/27/19 1229)  SpO2: 100 % (02/27/19 1241) Vital Signs (24h Range):  Temp:  [97.9 °F (36.6 °C)-100 °F (37.8 °C)] 98.5 °F (36.9 °C)  Pulse:  [] 114  Resp:  [16-22] 18  SpO2:  [95 %-100 %] 100 %  BP: (113-141)/(63-89) 121/78     Weight: 87.1 kg (192 lb)  Body mass index is 35.12 kg/m².    Intake/Output Summary (Last 24 hours) at 2/27/2019 1549  Last data filed at 2/27/2019 1100  Gross per 24 hour   Intake 2075 ml   Output 3925 ml   Net -1850 ml      Physical Exam   Constitutional: No distress.   Neck: No JVD present.   Cardiovascular: Normal rate, regular rhythm and normal heart sounds.   Pulmonary/Chest: Effort normal. No respiratory distress. She has wheezes. She has rales.   Abdominal: Soft. Bowel sounds are normal. She exhibits no distension.   Musculoskeletal: She exhibits edema (2+).   Neurological: She is alert.       MELD-Na score: 18 at 2/27/2019  3:24 AM  MELD score: 18 at 2/27/2019  3:24 AM  Calculated from:  Serum Creatinine: 0.8 mg/dL (Rounded to 1 mg/dL) at 2/27/2019  3:24 AM  Serum Sodium: 145 mmol/L (Rounded to 137 mmol/L) at 2/27/2019  3:24 AM  Total Bilirubin: 12.6 mg/dL at 2/27/2019  3:24 AM  INR(ratio): 1.2 at 2/27/2019  3:24 AM  Age: 63 years    Significant Labs:  CBC:  Recent Labs   Lab 02/26/19 0419 02/27/19 0324   WBC 12.23 13.87*   HGB 10.7* 10.6*   HCT 35.8* 36.3*    192     CMP:  Recent Labs   Lab 02/26/19 0419 02/27/19 0324    145   K 3.3* 4.2   CL 99 94*   CO2 40* 41*   * 257*   BUN  19 20   CREATININE 0.9 0.8   CALCIUM 9.2 9.1   PROT 5.6* 5.2*   ALBUMIN 1.9* 1.7*   BILITOT 11.4* 12.6*   ALKPHOS 4,037* 4,454*   * 698*   * 128*   ANIONGAP 6* 10   EGFRNONAA >60.0 >60.0     PTINR:  Recent Labs   Lab 02/26/19  1239 02/27/19  0324   INR 1.2 1.2

## 2019-02-27 NOTE — PLAN OF CARE
Problem: SLP Goal  Goal: SLP Goal  Speech Language Pathology Goals  Goals expected to be met by 2/28/19  1. Pt will participate in further, instrumental assessment via MBSS   2. Educate Pt and family on S/S aspiration and aspiration precautions   3. Pt will complete dysphagia exercises x10 ea with good effort 90% of attempts to improve BOT coordination and pharyngeal strength/coordination.                Patient seen for continued dysphagia therapy.     Danny Norman CF-SLP  Speech-Language Pathology  Pager: [phone]

## 2019-02-27 NOTE — ASSESSMENT & PLAN NOTE
Patient's mental status improved in the ICU and is now on nasal cannula oxygen and BiPAP at night.  Medicine initially consulted for assistance with management and noted patient to be markedly fluid overloaded, estimated to be approximately net positive 16 L with this admission.  She had elevated CVP and TTE was consistent volume overload as well as her BNP of > 4900.   - IV diuresis with Lasix 40 bid  with improvement in urine output.

## 2019-02-27 NOTE — NURSING
Received critical lab value- CO2 of 41. Patient tachycardic 120bpm. VS taken and stable, no distress noted and unlabored breathing. Notified Dr. Aaron Lott. No new orders. Will continue to monitor patient.

## 2019-02-27 NOTE — PROGRESS NOTES
[facility]  Hepatology  Progress Note    Patient Name: Sheryl Martines  MRN: [id number]  Admission Date: 1/23/2019  Hospital Length of Stay: 34 days  Attending Provider: Aaron Lott MD   Primary Care Physician: Primary Doctor No  Principal Problem:Acute metabolic encephalopathy    Subjective:     Transplant status: No    HPI: Sheryl Martines is a 62 y/o female with past medical history of COPD, CAD, HFpEF, Asthma, HTN, DM, recent complicated appendectomy in Aug 2018, complicated by C.diff infection, history of CVA with chronic debility, presented in January/23 2019 with intractable nausea and vomiting. She was found to have a pelvic abscess that was drained. She had a complicated postoperative course requiring intubation, CRRT. She stabilized initially, but was noted to have waxing and waning mental status which was thought to be due to hypercapnia, and was placed on BiPAP intermittently.   The patient was noted to have elevated ALKP/TB/and GGT since December 2018 when she started to have worsening of her clinical status. An U/S was obtained initially on 12/12/2018 with hepatomegaly and steatosis; which was similar to evaluation done in 2/2019. Since admission she had gradual worsening of ALKP/GGT/TB, with now ALKP 1700, TB 4.8, GGT 1700, AST//23. Her INR is 1.1. She has no known liver disease on prior chart review.  Overnight the patient was found to be somnolent, and a rapid response was called. Ammonia was noted to be elevated at 80. Hepatology is consulted to evaluate for possible liver cause for altered mental status      Interval History:   Patient clinically improved, and now stepped down to the floor.  She appears improved compared to our prior evaluation  Hepatology consulted as he TBili continued to trend up with TBili of 12, and ALKP of 4400.  Patient states that she is feeling well today. She appears lethargic, and bedbound.  On IV diuresis for congestion.    Current  Facility-Administered Medications   Medication    albuterol-ipratropium 2.5 mg-0.5 mg/3 mL nebulizer solution 3 mL    albuterol-ipratropium 2.5 mg-0.5 mg/3 mL nebulizer solution 3 mL    aspirin chewable tablet 81 mg    clopidogrel tablet 75 mg    dextrose 50% injection 12.5 g    diclofenac sodium 1 % gel 2 g    diphenhydrAMINE injection 12.5 mg    fluticasone-vilanterol 100-25 mcg/dose diskus inhaler 1 puff    [START ON 2/28/2019] furosemide injection 40 mg    glucagon (human recombinant) injection 1 mg    heparin (porcine) injection 5,000 Units    HYDROmorphone injection 0.5 mg    insulin aspart U-100 pen 1-10 Units    lactulose 20 gram/30 mL solution Soln 20 g    magnesium oxide tablet 400 mg    miconazole nitrate 2% ointment    ondansetron injection 4 mg    pantoprazole suspension 40 mg    potassium, sodium phosphates 280-160-250 mg packet 1 packet    senna-docusate 8.6-50 mg per tablet 1 tablet    sodium chloride 0.9% flush 5 mL    ursodiol capsule 600 mg       Objective:     Vital Signs (Most Recent):  Temp: 98.5 °F (36.9 °C) (02/27/19 1229)  Pulse: 90 (02/27/19 1241)  Resp: 18 (02/27/19 1241)  BP: 121/78 (02/27/19 1229)  SpO2: 100 % (02/27/19 1241) Vital Signs (24h Range):  Temp:  [97.9 °F (36.6 °C)-100 °F (37.8 °C)] 98.5 °F (36.9 °C)  Pulse:  [] 90  Resp:  [16-22] 18  SpO2:  [95 %-100 %] 100 %  BP: (113-141)/(63-89) 121/78     Weight: 87.1 kg (192 lb) (02/22/19 1045)  Body mass index is 35.12 kg/m².    Physical Exam   Constitutional: She appears lethargic. No distress.   HENT:   Head: Normocephalic.   Eyes: Conjunctivae are normal. Scleral icterus is present.   Neck: Normal range of motion. Neck supple.   Cardiovascular: Tachycardia present.   Pulmonary/Chest: Breath sounds normal. She is in respiratory distress (mild).   Abdominal: Soft. Bowel sounds are normal. She exhibits distension. She exhibits no mass. There is no tenderness. There is no rebound and no guarding.   PEG tube  in place.  Healing surgical scar.   Musculoskeletal: Normal range of motion.   Neurological: She appears lethargic.   Skin: Skin is warm and dry. There is pallor.   Psychiatric: She has a normal mood and affect.       MELD-Na score: 18 at 2/27/2019  3:24 AM  MELD score: 18 at 2/27/2019  3:24 AM  Calculated from:  Serum Creatinine: 0.8 mg/dL (Rounded to 1 mg/dL) at 2/27/2019  3:24 AM  Serum Sodium: 145 mmol/L (Rounded to 137 mmol/L) at 2/27/2019  3:24 AM  Total Bilirubin: 12.6 mg/dL at 2/27/2019  3:24 AM  INR(ratio): 1.2 at 2/27/2019  3:24 AM  Age: 63 years    Significant Labs:  CBC:   Recent Labs   Lab 02/27/19  0324   WBC 13.87*   RBC 3.56*   HGB 10.6*   HCT 36.3*        BMP:   Recent Labs   Lab 02/27/19 0324   *      K 4.2   CL 94*   CO2 41*   BUN 20   CREATININE 0.8   CALCIUM 9.1     CMP:   Recent Labs   Lab 02/27/19  0324   *   CALCIUM 9.1   ALBUMIN 1.7*   PROT 5.2*      K 4.2   CO2 41*   CL 94*   BUN 20   CREATININE 0.8   ALKPHOS 4,454*   *   *   BILITOT 12.6*     Coagulation:   Recent Labs   Lab 02/27/19  0324   INR 1.2       Significant Imaging:  Labs: Reviewed  US: Reviewed  CT: Reviewed    Assessment/Plan:     Elevated liver enzymes    Patient is a 64 y/o with a complicated hospital stay with infection/sepsis, hypercapnia, HF with volume overload, who we were consulted on on 2/20 for worsening elevation of alkaline phosphtase to 1700, with elevated TBili 4.5. Her U/S repeatedly did not show obstruction and was negative for cholecystitis (Neg HIDA). No imaging findings suggested underlying liver disease or portal HTN. She had normal synthetic function with normal INR. Elevation was thought to be likely due to cholestasis DILI (drug induced) vs sepsis. A hepatitis panel and an autoimmune workup were sent and came back negative.    Patient continued to have a rise in her TBili to 12 and ALKP to 4400, despite improvement of her clinical status. She remains  tachycardic with volume overload, but more alert today. This makes cholestasis related to DILI the most likely etiology. Liver biopsy to evaluate for etiology is unlikely to be helpful as it will not be able to differentiate DILI from other causes of cholestasis. On review of prior medications, ciprofloxacin is recognized to cause cholestatic injury that might be persistent despite discontinuation, but this is usually self limited. In occasional rare cases, it can result in vanishing bile duct syndrome. If patient continues to improve clinically with worsening TBili/ALKP in 2 weeks, we will consider a liver biopsy to evaluate for the syndrome. It is reassuring that her INR remained normal and she has improvement of her mental status, which confirms intact hepatic synthetic function.    -Recommend a trial of ursodiol 600mg BID to help with cholestasis  -Continue to trend CBC/CMP/INR daily  -Ciprofloxacin added to the patient's allergies. Fluoroquinolones should be avoided in the future.         Thank you for your consult. I will follow-up with patient. Please contact us if you have any additional questions.    Candace Santillan MD  Hepatology  [facility]

## 2019-02-27 NOTE — PROGRESS NOTES
Pt HR has been running ST (100-116). MD on call made aware. EKG ordered. BNP added to AM labs. MD said he will review patient chart. Will continue to monitor.

## 2019-02-27 NOTE — PLAN OF CARE
Problem: Adult Inpatient Plan of Care  Goal: Plan of Care Review  Pt AO to place and person. Abdominal wound care done by RN today. No wound noted to sacrum. Rectal tube in place 400cc today, nieves in place, orange looking  Urine, 3000cc , tolerated tube feedings at 45cc/h with no residue. Perineal care given and bed changed.

## 2019-02-27 NOTE — PROGRESS NOTES
"[facility]  General Surgery  Progress Note    Subjective:     History of Present Illness:  62 yo W with a history of HTN, COPD on home oxygen, HFpEF, IDDMII, CVA on plavix, HTN, debility after fall and broken hip, and PE who presents to the ED with a several month history of nausea, vomiting, inability to tolerate a diet and weight loss. She has had multiple admissions in the past few months for different ailments. She presents today with continued nausea, vomiting and abdominal pain that is mostly worse in the RLQ. During the examination, she states her pain was all over her abdomen because she was retching so much. She states that she has lost close to 40lbs since her surgery and that she only able to keep broth down. She had a lap converted to open appendectomy back in August 2018 that was complicated by a wound infection, C diff and failure to thrive. This seems to persists. She is now wheelchair bound (per her) and apparently lives in [place] but is here in [place] with her daughter.    She had a CT scan in the ED which revealed an irregular shaped rim enhancing fluid collection measuring at least 4.0 x 3.0 cm ight hemipelvis at the site of prior appendectomy. Surgery was consulted for further recommendations. She was also noted to have a "knot" at the RLQ incision site that is also tender.    Post-Op Info:  Procedure(s) (LRB):  LAPAROTOMY, EXPLORATORY (N/A)  INCISION AND DRAINAGE, ABSCESS-  drainage of pelvic abscess (N/A)  EXCISION, ABSCESS- drainage abdominal wall abscess (N/A)  APPLICATION, WOUND VAC (N/A)   33 Days Post-Op     Interval History:   On NC this morning. Resting peacefully, not having abdominal pain.  LFTs continue to trend up    Medications:  Continuous Infusions:    Scheduled Meds:   albuterol-ipratropium  3 mL Nebulization Q6H WAKE    aspirin  81 mg Per G Tube Daily    ciprofloxacin HCl  500 mg Per G Tube Q12H    clopidogrel  75 mg Per G Tube Daily    " fluticasone-vilanterol  1 puff Inhalation Daily    furosemide  40 mg Intravenous BID    heparin (porcine)  5,000 Units Subcutaneous Q8H    lactulose  20 g Per G Tube BID    metroNIDAZOLE  500 mg Per G Tube Q8H    miconazole nitrate 2%   Topical (Top) BID    pantoprazole  40 mg Per G Tube Daily    senna-docusate 8.6-50 mg  1 tablet Per G Tube BID     PRN Meds:albuterol-ipratropium, diclofenac sodium, diphenhydrAMINE, HYDROmorphone, ondansetron, sodium chloride 0.9%     Review of patient's allergies indicates:   Allergen Reactions    Ace inhibitors Swelling    Carvedilol      Other reaction(s): Other (See Comments)  blister    Hydralazine analogues     Metformin Nausea And Vomiting    Tetracycline Itching    Tetracyclines Swelling    Travatan (with benzalkonium) [travoprost (benzalkonium)]     Travoprost Itching     Other reaction(s): Other (See Comments)  Blurry vision     Objective:     Vital Signs (Most Recent):  Temp: 97 °F (36.1 °C) (02/26/19 0448)  Pulse: 105 (02/26/19 0709)  Resp: 19 (02/26/19 0448)  BP: (!) 140/87 (02/26/19 0448)  SpO2: 97 % (02/26/19 0448) Vital Signs (24h Range):  Temp:  [97 °F (36.1 °C)-98.3 °F (36.8 °C)] 97 °F (36.1 °C)  Pulse:  [] 105  Resp:  [14-28] 19  SpO2:  [97 %-100 %] 97 %  BP: (133-143)/(68-92) 140/87     Weight: 87.1 kg (192 lb)  Body mass index is 35.12 kg/m².    Intake/Output - Last 3 Shifts       02/24 0700 - 02/25 0659 02/25 0700 - 02/26 0659 02/26 0700 - 02/27 0659    I.V. (mL/kg) 344 (3.9)      NG/ 1312     Total Intake(mL/kg) 1164 (13.4) 1312 (15.1)     Urine (mL/kg/hr) 2970 (1.4) 4875 (2.3) 500 (3.5)    Drains  0     Stool 500 700     Total Output 3470 6320 500    Net -2306 -4263 -500           Stool Occurrence 1 x 2 x           Physical Exam   Constitutional: She appears well-developed and well-nourished.   HENT:   Head: Normocephalic and atraumatic.   Cardiovascular: tachycardic.   Pulmonary/Chest:   On nasal cannula   Abdominal: Soft.   Soft,  appropriately TTP, midline firmness  Multiple areas of excoriation and skin breakdown noted to anterior abdomen   Neurological:   Not moaning in pain. Answers questions.   Skin: Skin is warm and dry.   Excoriations on abdomen and buttocks   Vitals reviewed.    Significant Labs:  CBC:   Recent Labs   Lab 02/26/19  0419   WBC 12.23   RBC 3.64*   HGB 10.7*   HCT 35.8*      MCV 98   MCH 29.4   MCHC 29.9*     BMP:   Recent Labs   Lab 02/26/19  0419   *      K 3.3*   CL 99   CO2 40*   BUN 19   CREATININE 0.9   CALCIUM 9.2   MG 1.6     CMP:   Recent Labs   Lab 02/26/19  0419   *   CALCIUM 9.2   ALBUMIN 1.9*   PROT 5.6*      K 3.3*   CO2 40*   CL 99   BUN 19   CREATININE 0.9   ALKPHOS 4,037*   *   *   BILITOT 11.4*     LFTs:   Recent Labs   Lab 02/26/19 0419   *   *   ALKPHOS 4,037*   BILITOT 11.4*   PROT 5.6*   ALBUMIN 1.9*     Coagulation:   Recent Labs   Lab 02/19/19  1441   LABPROT 11.6   INR 1.1     ABGs:   Recent Labs   Lab 02/24/19  0356   PH 7.339*   PCO2 57.7*   PO2 121*   HCO3 31.0*   POCSATURATED 98   BE 5   Hida negative for acute cholecystitis.    Assessment/Plan:     Acute respiratory failure with hypoxia and hypercarbia    NC now, may need bipap again later     Abnormal thyroid function test    Endocrinology consulted: recommend against starting thyroid replacement hormone  Also recommended stopping hydrocortisone   Urinary incontinence without sensory awareness    Ritchie in place     Elevated liver enzymes    -LFTs, Tbili and Alk phos all increased  -CT showed biliary sludge  -consult hepatology - per recs this is severe cholestasis and likely related to medication or infection, not underlying hepatic pathology  - RUQ US on 2/20 showed thickened gallbladder wall and was equivocal for acute acalculous cholecystitis  - HIDA appears negative f/u official read     On enteral nutrition    Restarted TF     Dysphagia    -PEG placed 2/11/19, NPO, restarted  TF     Multiple skin tears    -Wound care following     Pelvic abscess in female    -S/p ex-lap with drainage of abscess. On antibiotics.  -Vanc completed.   -finished antibiotics     Depression    -Continue Cymbalta     Debility    -PT/OT; history of fall with non displaced oblique right tibia fracture at metaphysis into diaphysis. Was wearing brace.  -Severely debilited; needs placement. SW consulted and following       Severe malnutrition    -restarted TF     Gastroesophageal reflux disease without esophagitis    -Continue BID protonix     Acute on chronic diastolic (congestive) heart failure    Last echo 8/2018 with no WMAs, grade 1DD, EF 60%  -Strict I/O, daily weights     Moderate asthma without complication    -Continue with scheduled duonebs  -on NC this am     Elevated troponin    -Cardiology consulted. On ASA/plavix at home     Coronary artery disease involving native coronary artery of native heart without angina pectoris    -ASA, restart plavix     Type 2 diabetes mellitus without complication, without long-term current use of insulin    -SSI, appreciate endocrine assistance        S/p transfer to medicine team. Will continue to follow. Tachycardic this morning with WBC up to 13.     Cassie Choudhary MD  General Surgery  [facility]

## 2019-02-27 NOTE — ASSESSMENT & PLAN NOTE
Mostly wheelchair bound, but able to work with [facility] using walker  - Long-term acute care facility -abdominal wound requiring dressing, needs intensive PT, on tube feeds requiring extensive SLP.  - needs CHF and liver issue better first.

## 2019-02-27 NOTE — ASSESSMENT & PLAN NOTE
Patient is a 62 y/o with a complicated hospital stay with infection/sepsis, hypercapnia, HF with volume overload, who we were consulted on on 2/20 for worsening elevation of alkaline phosphtase to 1700, with elevated TBili 4.5. Her U/S repeatedly did not show obstruction and was negative for cholecystitis (Neg HIDA). No imaging findings suggested underlying liver disease or portal HTN. She had normal synthetic function with normal INR. Elevation was thought to be likely due to cholestasis DILI (drug induced) vs sepsis. A hepatitis panel and an autoimmune workup were sent and came back negative.    Patient continued to have a rise in her TBili to 12, and ALKP to 4400, despite improvement of her clinical status. She remains tachycardic with volume overload, but more alert today. This makes a diagnosis of cholestasis related to DILI the most likely etiology. Liver biopsy to evaluate for etiology is unlikely to be helpful, as it will not be able to differentiate DILI from other causes of cholestasis. On review of prior medications, ciprofloxacin is recognized to cause cholestatic injury that might be persistent despite discontinuation, but this is usually self limited. In occasional rare cases, it can result in vanishing bile duct syndrome. If patient continues to improve clinically with worsening TBili/ALKP in 2 weeks, we will consider a liver biopsy to evaluate for the syndrome. It is reassuring that her INR remained normal, and she has improvement of her mental status, which confirms intact hepatic synthetic function.    -Recommend a trial of ursodiol 600mg BID to help with cholestasis  -Continue to trend CBC/CMP/INR daily  -Ciprofloxacin added to the patient's allergies. Fluoroquinolone should be avoided in the future.

## 2019-02-27 NOTE — ASSESSMENT & PLAN NOTE
Around 2/22 pt was in junctional augusto. Cards saw pt and recommended that rate control meds (diltiazem) be held.  Augusto resolved.   - monitor rate

## 2019-02-27 NOTE — PT/OT/SLP PROGRESS
"Speech Language Pathology Treatment    Patient Name:  Sheryl Martines   MRN:  [id number]  Admitting Diagnosis: Acute metabolic encephalopathy    Recommendations:                 General Recommendations:  Dysphagia therapy  Diet recommendations:  NPO, Liquid Diet Level: NPO   Aspiration Precautions: Continue alternate means of nutrition and Strict aspiration precautions   General Precautions: Standard, aspiration, fall, NPO  Communication strategies:  provide increased time to answer and go to room if call light pushed    Subjective     Patient awake but lethargic during session  Communicated with nurse prior to session    Pain/Comfort:  Pain Rating 1: (pt did not rate)  Location - Orientation 1: distal  Location 1: ("butt")  Pain Addressed 1: Reposition, Distraction, Nurse notified  Pain Rating Post-Intervention 1: (pt did not rate)    Objective:     Has the patient been evaluated by SLP for swallowing?   Yes  Keep patient NPO? Yes   Current Respiratory Status: nasal cannula      Patient seen for continued swallow assessment and dysphagia therapy. SLP and PCT repositioned patient for PO trials, but she was uncomfortable for majority of session despite multiple attempts at repositioning. She was excited for PO trials and agreed to remain in upright position for duration of the session. She exhibited a relatively strong throat clear/cough prior to PO trials. She was unable to produce a dry volitional swallow 2' to reported xerostomia. SLP performed oral care using oral swab and suction as patient was unable to produce a swallow following cleaning.  She tolerated ice chip trials x3 with no overt signs of aspiration, but her swallow was delayed and laryngeal elevation was reduced from prior session. She completed effortful swallows x4 with max cueing from SLP, but she was easily fatigued during these and session was halted when patient could no longer maintain alertness.  SLP educated patient regarding POC, but she would " benefit from further instruction. Patient left in bed with call light within reach.     Assessment:     Sheryl Martines is a 63 y.o. female with an SLP diagnosis of Dysphagia.      Goals:   Multidisciplinary Problems     SLP Goals        Problem: SLP Goal    Goal Priority Disciplines Outcome   SLP Goal     SLP Ongoing (interventions implemented as appropriate)   Description:  Speech Language Pathology Goals  Goals expected to be met by 2/28/19  1. Pt will participate in further, instrumental assessment via MBSS   2. Educate Pt and family on S/S aspiration and aspiration precautions   3. Pt will complete dysphagia exercises x10 ea with good effort 90% of attempts to improve BOT coordination and pharyngeal strength/coordination.                                 Plan:     · Patient to be seen:  3 x/week   · Plan of Care expires:  03/21/19  · Plan of Care reviewed with:  patient   · SLP Follow-Up:  Yes       Discharge recommendations:  nursing facility, skilled   Barriers to Discharge:  Level of Skilled Assistance Needed     Time Tracking:     SLP Treatment Date:   02/27/19  Speech Start Time:  1221  Speech Stop Time:  1238     Speech Total Time (min):  17 min    Billable Minutes: Treatment Swallowing Dysfunction 17    ELIZABETH Celestin-SLP  Speech-Language Pathology  Pager: [phone]   02/27/2019

## 2019-02-28 LAB
ALBUMIN SERPL BCP-MCNC: 1.7 G/DL
ALP SERPL-CCNC: 4004 U/L
ALT SERPL W/O P-5'-P-CCNC: 104 U/L
ANION GAP SERPL CALC-SCNC: 7 MMOL/L
AST SERPL-CCNC: 422 U/L
BASOPHILS # BLD AUTO: 0.04 K/UL
BASOPHILS NFR BLD: 0.3 %
BILIRUB SERPL-MCNC: 12.1 MG/DL
BUN SERPL-MCNC: 20 MG/DL
CALCIUM SERPL-MCNC: 8.9 MG/DL
CHLORIDE SERPL-SCNC: 97 MMOL/L
CO2 SERPL-SCNC: 41 MMOL/L
CREAT SERPL-MCNC: 0.7 MG/DL
DIFFERENTIAL METHOD: ABNORMAL
EOSINOPHIL # BLD AUTO: 0.8 K/UL
EOSINOPHIL NFR BLD: 5.7 %
ERYTHROCYTE [DISTWIDTH] IN BLOOD BY AUTOMATED COUNT: 21.7 %
EST. GFR  (AFRICAN AMERICAN): >60 ML/MIN/1.73 M^2
EST. GFR  (NON AFRICAN AMERICAN): >60 ML/MIN/1.73 M^2
GLUCOSE SERPL-MCNC: 179 MG/DL
HCT VFR BLD AUTO: 35.7 %
HGB BLD-MCNC: 10.6 G/DL
IMM GRANULOCYTES # BLD AUTO: 0.18 K/UL
IMM GRANULOCYTES NFR BLD AUTO: 1.2 %
LYMPHOCYTES # BLD AUTO: 5.5 K/UL
LYMPHOCYTES NFR BLD: 37.6 %
MAGNESIUM SERPL-MCNC: 1.8 MG/DL
MCH RBC QN AUTO: 30.4 PG
MCHC RBC AUTO-ENTMCNC: 29.7 G/DL
MCV RBC AUTO: 102 FL
MONOCYTES # BLD AUTO: 1.9 K/UL
MONOCYTES NFR BLD: 12.7 %
NEUTROPHILS # BLD AUTO: 6.3 K/UL
NEUTROPHILS NFR BLD: 42.5 %
NRBC BLD-RTO: 5 /100 WBC
PHOSPHATE SERPL-MCNC: 3.6 MG/DL
PLATELET # BLD AUTO: 192 K/UL
PMV BLD AUTO: 11.7 FL
POCT GLUCOSE: 180 MG/DL (ref 70–110)
POCT GLUCOSE: 205 MG/DL (ref 70–110)
POCT GLUCOSE: 214 MG/DL (ref 70–110)
POCT GLUCOSE: 218 MG/DL (ref 70–110)
POTASSIUM SERPL-SCNC: 4.3 MMOL/L
PREALB SERPL-MCNC: 5 MG/DL
PROT SERPL-MCNC: 5.3 G/DL
RBC # BLD AUTO: 3.49 M/UL
SODIUM SERPL-SCNC: 145 MMOL/L
TROPONIN I SERPL DL<=0.01 NG/ML-MCNC: 0.2 NG/ML
TROPONIN I SERPL DL<=0.01 NG/ML-MCNC: 0.24 NG/ML
WBC # BLD AUTO: 14.7 K/UL

## 2019-02-28 PROCEDURE — 99232 PR SUBSEQUENT HOSPITAL CARE,LEVL II: ICD-10-PCS | Mod: ,,, | Performed by: HOSPITALIST

## 2019-02-28 PROCEDURE — 11000001 HC ACUTE MED/SURG PRIVATE ROOM

## 2019-02-28 PROCEDURE — 99900035 HC TECH TIME PER 15 MIN (STAT)

## 2019-02-28 PROCEDURE — 80053 COMPREHEN METABOLIC PANEL: CPT

## 2019-02-28 PROCEDURE — 83735 ASSAY OF MAGNESIUM: CPT

## 2019-02-28 PROCEDURE — 94761 N-INVAS EAR/PLS OXIMETRY MLT: CPT

## 2019-02-28 PROCEDURE — 63600175 PHARM REV CODE 636 W HCPCS: Performed by: HOSPITALIST

## 2019-02-28 PROCEDURE — 63600175 PHARM REV CODE 636 W HCPCS: Performed by: STUDENT IN AN ORGANIZED HEALTH CARE EDUCATION/TRAINING PROGRAM

## 2019-02-28 PROCEDURE — 25000003 PHARM REV CODE 250: Performed by: HOSPITALIST

## 2019-02-28 PROCEDURE — 25000242 PHARM REV CODE 250 ALT 637 W/ HCPCS: Performed by: HOSPITALIST

## 2019-02-28 PROCEDURE — 94640 AIRWAY INHALATION TREATMENT: CPT

## 2019-02-28 PROCEDURE — 94660 CPAP INITIATION&MGMT: CPT

## 2019-02-28 PROCEDURE — 27000221 HC OXYGEN, UP TO 24 HOURS

## 2019-02-28 PROCEDURE — 85025 COMPLETE CBC W/AUTO DIFF WBC: CPT

## 2019-02-28 PROCEDURE — 92526 ORAL FUNCTION THERAPY: CPT

## 2019-02-28 PROCEDURE — 84484 ASSAY OF TROPONIN QUANT: CPT | Mod: 91

## 2019-02-28 PROCEDURE — 94664 DEMO&/EVAL PT USE INHALER: CPT

## 2019-02-28 PROCEDURE — 84484 ASSAY OF TROPONIN QUANT: CPT

## 2019-02-28 PROCEDURE — 84134 ASSAY OF PREALBUMIN: CPT

## 2019-02-28 PROCEDURE — 99232 SBSQ HOSP IP/OBS MODERATE 35: CPT | Mod: ,,, | Performed by: HOSPITALIST

## 2019-02-28 PROCEDURE — 84100 ASSAY OF PHOSPHORUS: CPT

## 2019-02-28 RX ORDER — LACTULOSE 10 G/15ML
20 SOLUTION ORAL EVERY 6 HOURS PRN
Status: DISCONTINUED | OUTPATIENT
Start: 2019-02-28 | End: 2019-03-16 | Stop reason: HOSPADM

## 2019-02-28 RX ORDER — FUROSEMIDE 10 MG/ML
40 INJECTION INTRAMUSCULAR; INTRAVENOUS 2 TIMES DAILY
Status: DISCONTINUED | OUTPATIENT
Start: 2019-02-28 | End: 2019-03-01

## 2019-02-28 RX ADMIN — HEPARIN SODIUM 5000 UNITS: 5000 INJECTION, SOLUTION INTRAVENOUS; SUBCUTANEOUS at 10:02

## 2019-02-28 RX ADMIN — URSODIOL 500 MG: 250 TABLET, FILM COATED ORAL at 09:02

## 2019-02-28 RX ADMIN — INSULIN ASPART 2 UNITS: 100 INJECTION, SOLUTION INTRAVENOUS; SUBCUTANEOUS at 02:02

## 2019-02-28 RX ADMIN — HYDROMORPHONE HYDROCHLORIDE 0.5 MG: 1 INJECTION, SOLUTION INTRAMUSCULAR; INTRAVENOUS; SUBCUTANEOUS at 09:02

## 2019-02-28 RX ADMIN — URSODIOL 500 MG: 250 TABLET, FILM COATED ORAL at 10:02

## 2019-02-28 RX ADMIN — POTASSIUM & SODIUM PHOSPHATES POWDER PACK 280-160-250 MG 1 PACKET: 280-160-250 PACK at 10:02

## 2019-02-28 RX ADMIN — DULOXETINE 60 MG: 60 CAPSULE, DELAYED RELEASE ORAL at 09:02

## 2019-02-28 RX ADMIN — Medication 400 MG: at 10:02

## 2019-02-28 RX ADMIN — POTASSIUM & SODIUM PHOSPHATES POWDER PACK 280-160-250 MG 1 PACKET: 280-160-250 PACK at 05:02

## 2019-02-28 RX ADMIN — ASPIRIN 81 MG CHEWABLE TABLET 81 MG: 81 TABLET CHEWABLE at 09:02

## 2019-02-28 RX ADMIN — HYDROMORPHONE HYDROCHLORIDE 0.5 MG: 1 INJECTION, SOLUTION INTRAMUSCULAR; INTRAVENOUS; SUBCUTANEOUS at 05:02

## 2019-02-28 RX ADMIN — HEPARIN SODIUM 5000 UNITS: 5000 INJECTION, SOLUTION INTRAVENOUS; SUBCUTANEOUS at 06:02

## 2019-02-28 RX ADMIN — FUROSEMIDE 40 MG: 10 INJECTION, SOLUTION INTRAVENOUS at 05:02

## 2019-02-28 RX ADMIN — HYDROMORPHONE HYDROCHLORIDE 0.5 MG: 1 INJECTION, SOLUTION INTRAMUSCULAR; INTRAVENOUS; SUBCUTANEOUS at 06:02

## 2019-02-28 RX ADMIN — INSULIN ASPART 4 UNITS: 100 INJECTION, SOLUTION INTRAVENOUS; SUBCUTANEOUS at 09:02

## 2019-02-28 RX ADMIN — INSULIN ASPART 1 UNITS: 100 INJECTION, SOLUTION INTRAVENOUS; SUBCUTANEOUS at 11:02

## 2019-02-28 RX ADMIN — DIPHENHYDRAMINE HYDROCHLORIDE 12.5 MG: 50 INJECTION, SOLUTION INTRAMUSCULAR; INTRAVENOUS at 01:02

## 2019-02-28 RX ADMIN — INSULIN ASPART 4 UNITS: 100 INJECTION, SOLUTION INTRAVENOUS; SUBCUTANEOUS at 05:02

## 2019-02-28 RX ADMIN — THEOPHYLLINE ANHYDROUS 300 MG: 100 CAPSULE, EXTENDED RELEASE ORAL at 09:02

## 2019-02-28 RX ADMIN — PREDNISONE 10 MG: 5 TABLET ORAL at 09:02

## 2019-02-28 RX ADMIN — POTASSIUM & SODIUM PHOSPHATES POWDER PACK 280-160-250 MG 1 PACKET: 280-160-250 PACK at 09:02

## 2019-02-28 RX ADMIN — HYDROMORPHONE HYDROCHLORIDE 0.5 MG: 1 INJECTION, SOLUTION INTRAMUSCULAR; INTRAVENOUS; SUBCUTANEOUS at 01:02

## 2019-02-28 RX ADMIN — DIPHENHYDRAMINE HYDROCHLORIDE 12.5 MG: 50 INJECTION, SOLUTION INTRAMUSCULAR; INTRAVENOUS at 10:02

## 2019-02-28 RX ADMIN — POTASSIUM & SODIUM PHOSPHATES POWDER PACK 280-160-250 MG 1 PACKET: 280-160-250 PACK at 01:02

## 2019-02-28 RX ADMIN — HEPARIN SODIUM 5000 UNITS: 5000 INJECTION, SOLUTION INTRAVENOUS; SUBCUTANEOUS at 01:02

## 2019-02-28 RX ADMIN — FUROSEMIDE 40 MG: 10 INJECTION, SOLUTION INTRAVENOUS at 09:02

## 2019-02-28 RX ADMIN — DIPHENHYDRAMINE HYDROCHLORIDE 12.5 MG: 50 INJECTION, SOLUTION INTRAMUSCULAR; INTRAVENOUS at 04:02

## 2019-02-28 RX ADMIN — INSULIN ASPART 4 UNITS: 100 INJECTION, SOLUTION INTRAVENOUS; SUBCUTANEOUS at 06:02

## 2019-02-28 RX ADMIN — PANTOPRAZOLE SODIUM 40 MG: 40 GRANULE, DELAYED RELEASE ORAL at 09:02

## 2019-02-28 RX ADMIN — MICONAZOLE NITRATE: 20 OINTMENT TOPICAL at 09:02

## 2019-02-28 RX ADMIN — IPRATROPIUM BROMIDE AND ALBUTEROL SULFATE 3 ML: .5; 3 SOLUTION RESPIRATORY (INHALATION) at 09:02

## 2019-02-28 RX ADMIN — Medication 400 MG: at 09:02

## 2019-02-28 RX ADMIN — CLOPIDOGREL 75 MG: 75 TABLET, FILM COATED ORAL at 09:02

## 2019-02-28 RX ADMIN — FLUTICASONE FUROATE AND VILANTEROL TRIFENATATE 1 PUFF: 100; 25 POWDER RESPIRATORY (INHALATION) at 09:02

## 2019-02-28 RX ADMIN — IPRATROPIUM BROMIDE AND ALBUTEROL SULFATE 3 ML: .5; 3 SOLUTION RESPIRATORY (INHALATION) at 07:02

## 2019-02-28 NOTE — PLAN OF CARE
02/28/19 1557   Post-Acute Status   Post-Acute Authorization Placement   Post-Acute Placement Status Referrals Sent     [facility] referral sent but they are not in network with [facility] Managed Medicare plan. ION spoke with pts peter Boucher, she asked for referral to be sent to [facility]. SW requested [facility] send referral.    Sonja hCurchill, RONNIW [id number]

## 2019-02-28 NOTE — PLAN OF CARE
Problem: Adult Inpatient Plan of Care  Goal: Plan of Care Review  Outcome: Ongoing (interventions implemented as appropriate)  Pt free from any injury or falls, VSS, skin intact. Turned every 2 hours to prevent skin breakdown. PRN pain medication given for pain when needed. Tube feeds continued, no residuals. Catheter care completed. Will continue to monitor.

## 2019-02-28 NOTE — NURSING
Patient's abdominal incision slightly dehisced no drainage. Dr. Lott notified. MD contacted surgery team. On call for surgery stated team would round in the morning[facility]

## 2019-02-28 NOTE — NURSING
Patient had a run of vtach w/ couplets and pvcs. Notified Charge nurse. Rapid nurse notified. VS taken /53 HR 49 MAP 79. Patient denies sob or chest pain and unlabored breathing. Dr. Aaron Lott notified. MD stated he would put in orders. Wcp.

## 2019-02-28 NOTE — PROGRESS NOTES
General Surgery Progress Note:    Subjective:  Patient seen and examined at bedside.   On NC this am, on biPAP overnight. Voiced that her abdominal pain was minimal, mainly just had rectal pain this am. No other complaints.     Objective:  Vitals:    02/28/19 0842 02/28/19 0942 02/28/19 1044 02/28/19 1131   BP: 122/80   109/72   BP Location: Right arm   Right arm   Patient Position: Lying   Lying   Pulse: (!) 112 100 (!) 113 109   Resp: 18 18  20   Temp: 99.6 °F (37.6 °C)   99.4 °F (37.4 °C)   TempSrc: Oral      SpO2: 100%   96%   Weight:       Height:           Physical Exam   Constitutional: She appears well-developed and well-nourished.   HENT:   Head: Normocephalic and atraumatic.   Cardiovascular: tachycardic.   Pulmonary/Chest:   On nasal cannula   Abdominal: Soft.   Soft, appropriately TTP, midline firmness  Multiple areas of excoriation and skin breakdown noted to anterior abdomen   Neurological:   Not moaning in pain. Answers questions.   Skin: Skin is warm and dry.   Excoriations on abdomen  Vitals reviewed.    Assessment/Plan:  S/p transfer to medicine team. Doing well this am. Still tachycardic and requiring biPAP overnight. However, on NC this am. More alert and oriented on exam. Will continue to follow.      Chyna Ott MD  General Surgery, PGYI [facility]

## 2019-02-28 NOTE — ASSESSMENT & PLAN NOTE
-S/p ex-lap with drainage of abscess.  -Vanc completed.   -finished antibiotics  -surgery following peripherally at this point

## 2019-02-28 NOTE — NURSING
Rapid nurse came to assess patient. Labs drawn and sent to lab. Patient is awake and alert and answers questions appropriately, no distress noted, and unlabored breathing.     Notified Dr. Lott of CO2 of 64.7 and pH of 7.4, night nurse dayday notified Dr. Lott of CO2 lab value of 41. No new orders. Nurse will continue to monitor patient.

## 2019-02-28 NOTE — SUBJECTIVE & OBJECTIVE
Interval History:   Symptoms:   Same.  Diet:  Tolerating TF  Activity level:  Impaired due to weakness.    Pain:  No pain.       Review of Systems   Constitutional: Negative for fever.   Respiratory: Negative for shortness of breath.    Cardiovascular: Negative for chest pain.   Gastrointestinal: Negative for abdominal pain.     Objective:     Vital Signs (Most Recent):  Temp: 96.5 °F (35.8 °C) (02/28/19 1609)  Pulse: 105 (02/28/19 1609)  Resp: 20 (02/28/19 1609)  BP: (!) 105/57 (02/28/19 1609)  SpO2: 96 % (02/28/19 1609) Vital Signs (24h Range):  Temp:  [96.1 °F (35.6 °C)-99.6 °F (37.6 °C)] 96.5 °F (35.8 °C)  Pulse:  [] 105  Resp:  [13-20] 20  SpO2:  [94 %-100 %] 96 %  BP: (105-137)/(57-80) 105/57     Weight: 87.1 kg (192 lb)  Body mass index is 35.12 kg/m².    Intake/Output Summary (Last 24 hours) at 2/28/2019 1610  Last data filed at 2/28/2019 1415  Gross per 24 hour   Intake 1868 ml   Output 1620 ml   Net 248 ml      Physical Exam   Constitutional: No distress.   Neck: No JVD present.   Cardiovascular: Normal rate, regular rhythm and normal heart sounds.   Pulmonary/Chest: Effort normal. No respiratory distress. She has wheezes. She has rales.   Abdominal: Soft. Bowel sounds are normal. She exhibits no distension.   Musculoskeletal: She exhibits edema (2+).   Neurological: She is alert.       MELD-Na score: 18 at 2/28/2019  5:58 AM  MELD score: 18 at 2/28/2019  5:58 AM  Calculated from:  Serum Creatinine: 0.7 mg/dL (Rounded to 1 mg/dL) at 2/28/2019  5:58 AM  Serum Sodium: 145 mmol/L (Rounded to 137 mmol/L) at 2/28/2019  5:58 AM  Total Bilirubin: 12.1 mg/dL at 2/28/2019  5:58 AM  INR(ratio): 1.2 at 2/27/2019  3:24 AM  Age: 63 years    Significant Labs:  CBC:  Recent Labs   Lab 02/27/19 0324 02/28/19  0558   WBC 13.87* 14.70*   HGB 10.6* 10.6*   HCT 36.3* 35.7*    192     CMP:  Recent Labs   Lab 02/27/19 0324 02/27/19  1828 02/28/19  0558    143 145   K 4.2 4.7 4.3   CL 94* 96 97   CO2 41* 41*  41*   * 196* 179*   BUN 20 20 20   CREATININE 0.8 0.6 0.7   CALCIUM 9.1 9.0 8.9   PROT 5.2* 5.4* 5.3*   ALBUMIN 1.7* 1.7* 1.7*   BILITOT 12.6* 12.9* 12.1*   ALKPHOS 4,454* 4,383* 4,004*   * 644* 422*   * 124* 104*   ANIONGAP 10 6* 7*   EGFRNONAA >60.0 >60.0 >60.0     PTINR:  Recent Labs   Lab 02/27/19  0324   INR 1.2

## 2019-02-28 NOTE — PLAN OF CARE
Problem: SLP Goal  Goal: SLP Goal  Speech Language Pathology Goals  Goals expected to be met by 3/7/19  1. Pt will participate in further, instrumental assessment via MBSS when appropriate.   2. Educate Pt and family on S/S aspiration and aspiration precautions   3. Pt will complete dysphagia exercises x10 ea with good effort 90% of attempts to improve BOT coordination and pharyngeal strength/coordination.                Patient seen for continued dysphagia therapy.    Danny Norman CF-SLP  Speech-Language Pathology  Pager: [phone]

## 2019-02-28 NOTE — ASSESSMENT & PLAN NOTE
Acute respiratory failure with hypoxia and hypercarbia  Pt prone to hypercapneic narcosis.  Steroid dependant asthma.  - c/w daily breo, theophylline, PRN duonebs  - qhs bipap and prn during the day

## 2019-02-28 NOTE — CARE UPDATE
RN Proactive Rounding Note  Time of Visit:     Admit Date: 2019  LOS: 34  Code Status: Full Code   Date of Visit: 2019  : 1955  Age: 63 y.o.  Sex: female  Race: Black or   Bed: 1001/1001 A:   MRN: [id number]  Was the patient discharged from an ICU this admission? yes   Was the patient discharged from a PACU within last 24 hours?  no  Did the patient receive conscious sedation/general anesthesia in last 24 hours?  no  Was the patient in the ED within the past 24 hours?  no  Was the patient started on NIPPV within the past 24 hours?  no  Attending Physician: Aaron Lott MD  Primary Service: [facility] A      ASSESSMENT:     Abnormal Vital Signs: 5 beat run Vtach  Clinical Issues: Dysrythmia     INTERVENTIONS/ RECOMMENDATIONS:     Upon arrival to bedside, pt noted to be awake and alert. Answering questions appropriately. No complaints of chest pain or SOB. Primary MD notified prior to Rapid RN arrival. Orders placed by primary MD for troponin and electrolyte checks. EKG done. Patient on 3L NC. Unable to obtain ABG, VBG obtained from R port-a-cath. No signs of distress at present.     Discussed plan of care with RNSkylar.    PHYSICIAN ESCALATION:     Yes/No  no    Orders received and case discussed with N/A .    Disposition: Remain in room 1001.    FOLLOW-UP/CONTINGENCY:     Call back the Rapid Response Nurse at x [phone] for additional questions or concerns.

## 2019-02-28 NOTE — PT/OT/SLP PROGRESS
Speech Language Pathology Treatment    Patient Name:  Sheryl Martines   MRN:  [id number]  Admitting Diagnosis: Acute on chronic diastolic (congestive) heart failure    Recommendations:                 General Recommendations:  Dysphagia therapy  Diet recommendations:  NPO, Liquid Diet Level: NPO   Aspiration Precautions: Continue alternate means of nutrition and Strict aspiration precautions   General Precautions: Standard, aspiration, NPO, fall  Communication strategies:  provide increased time to answer and go to room if call light pushed    Subjective     Patient awake but more lethargic than during past sessions  Communicated with nurse prior to session    Pain/Comfort:  Pain Rating 1: (pt did not rate)  Location - Side 1: Left  Location 1: (butt)  Pain Addressed 1: Reposition, Cessation of Activity  Pain Rating Post-Intervention 1: (pt did not rate)    Objective:     Has the patient been evaluated by SLP for swallowing?   Yes  Keep patient NPO? Yes   Current Respiratory Status: nasal cannula      Patient seen for continued swallow assessment and dysphagia therapy. Patient was sitting up in bed during session. Similarly to prior sessions, patient reported pain when HOB was elevated, but she agreed to participate in session. Patient was initially awake and alert when SLP offered PO trials of ice chips, but she fatigued quickly and required max verbal and tactile cueing to maintain alertness. When patient was awake, she tolerated PO trials of ice chips x3 with no overt signs of aspiration. However, by the third trial patient's swallow was significantly delayed and with reduced laryngeal elevation. Throughgout PO trials, patient completed hard glottal /k/ & /g/ x5 and pitch glides x5 each with max assist and modeling from SLP. Patient exhibited reduced participation and tolerance for dysphagia therapy this session. SLP educated patient regarding POC, but she would benefit from further instruction. Patient left in  bed with call light within reach.     Assessment:     Sheryl Martines is a 63 y.o. female with an SLP diagnosis of Dysphagia.      Goals:   Multidisciplinary Problems     SLP Goals        Problem: SLP Goal    Goal Priority Disciplines Outcome   SLP Goal     SLP Ongoing (interventions implemented as appropriate)   Description:  Speech Language Pathology Goals  Goals expected to be met by 3/7/19  1. Pt will participate in further, instrumental assessment via MBSS when appropriate.   2. Educate Pt and family on S/S aspiration and aspiration precautions   3. Pt will complete dysphagia exercises x10 ea with good effort 90% of attempts to improve BOT coordination and pharyngeal strength/coordination.                                  Plan:     · Patient to be seen:  3 x/week   · Plan of Care expires:  03/21/19  · Plan of Care reviewed with:  patient   · SLP Follow-Up:  Yes       Discharge recommendations:  nursing facility, skilled   Barriers to Discharge:  Level of Skilled Assistance Needed     Time Tracking:     SLP Treatment Date:   02/28/19  Speech Start Time:  1137  Speech Stop Time:  1147     Speech Total Time (min):  10 min    Billable Minutes: Treatment Swallowing Dysfunction 10    ELIZABETH Celestin-SLP  Speech-Language Pathology  Pager: [phone]   02/28/2019

## 2019-02-28 NOTE — NURSING
Consult to wound care placed. No wound care orders, was told wound care cares for the wounds. Stage 2 to sacrum, midline incision to lower abdomen (lower portion dehiscence) with yellow pus, also there is packing that fell out of an incision in RLQ.

## 2019-02-28 NOTE — PLAN OF CARE
Problem: Adult Inpatient Plan of Care  Goal: Plan of Care Review  Outcome: Ongoing (interventions implemented as appropriate)  POC reviewed with patient who verbalized understanding but needs reinforcement. Only oriented to self at beginning of shift but is now AAOx4. Neuro checks completed Q4. VSS on 2L NC, wore Bipap for roughly 4 hours overnight then took off mask and refused to put back on. All incisions and wounds noted with dressings CDI. Patient turned every 2 hours and kept off sacral area. Aspiration precautions maintained, HOB kept elevated and patient kept NPO. TF infusing @ 45cc/hr continuous to PEG tube, no residuals, water boluses given per orders.  Ritchie intact draining bert, concentrated urine. Rectal tube remains in place, liquid watery stool noted. Blood glucose checked Q4 with coverage given. Safety precautions maintained, call light within reach, tele sitter in place. Heels floated off bed as patient would allow. Remains free from falls and injury. No acute events. No distress noted. Will continue to monitor.

## 2019-02-28 NOTE — SUBJECTIVE & OBJECTIVE
Interval History:   On NC this morning. Resting peacefully, stating her abdomen is somewhat sore as well as her rectum. Breathing is comfortable.     Medications:  Continuous Infusions:    Scheduled Meds:   albuterol-ipratropium  3 mL Nebulization Q6H WAKE    aspirin  81 mg Per G Tube Daily    clopidogrel  75 mg Per G Tube Daily    DULoxetine  60 mg Per G Tube Daily    fluticasone-vilanterol  1 puff Inhalation Daily    furosemide  40 mg Intravenous BID    heparin (porcine)  5,000 Units Subcutaneous Q8H    magnesium oxide  400 mg Per G Tube BID    miconazole nitrate 2%   Topical (Top) BID    pantoprazole  40 mg Per G Tube Daily    potassium, sodium phosphates  1 packet Per G Tube QID (WM & HS)    predniSONE  10 mg Per G Tube Daily    theophylline  300 mg Oral Daily    ursodiol  500 mg Per G Tube BID     PRN Meds:albuterol-ipratropium, dextrose 50%, diclofenac sodium, diphenhydrAMINE, glucagon (human recombinant), HYDROmorphone, insulin aspart U-100, lactulose, ondansetron, sodium chloride 0.9%     Review of patient's allergies indicates:   Allergen Reactions    Ace inhibitors Swelling    Carvedilol      Other reaction(s): Other (See Comments)  blister    Hydralazine analogues     Metformin Nausea And Vomiting    Tetracycline Itching    Tetracyclines Swelling    Travatan (with benzalkonium) [travoprost (benzalkonium)]     Travoprost Itching     Other reaction(s): Other (See Comments)  Blurry vision     Objective:     Vital Signs (Most Recent):  Temp: 99.4 °F (37.4 °C) (02/28/19 1131)  Pulse: 109 (02/28/19 1131)  Resp: 20 (02/28/19 1131)  BP: 109/72 (02/28/19 1131)  SpO2: 96 % (02/28/19 1131) Vital Signs (24h Range):  Temp:  [96.1 °F (35.6 °C)-99.6 °F (37.6 °C)] 99.4 °F (37.4 °C)  Pulse:  [] 109  Resp:  [13-20] 20  SpO2:  [94 %-100 %] 96 %  BP: (109-137)/(63-80) 109/72     Weight: 87.1 kg (192 lb)  Body mass index is 35.12 kg/m².    Intake/Output - Last 3 Shifts       02/26 0700 - 02/27 5601  02/27 0700 - 02/28 0659 02/28 0700 - 03/01 0659    P.O.  0     NG/GT 2135 1678 250    Total Intake(mL/kg) 2135 (24.5) 1678 (19.3) 250 (2.9)    Urine (mL/kg/hr) 4325 (2.1) 1345 (0.6)     Emesis/NG output  0     Drains 20      Stool 600 575     Total Output 4945 1920     Net -2810 -242 +250           Stool Occurrence   1 x    Emesis Occurrence  0 x           Physical Exam   Constitutional: She appears well-developed and well-nourished.   HENT:   Head: Normocephalic and atraumatic.   Cardiovascular: Normal rate and regular rhythm.   Pulmonary/Chest:   On nasal cannula   Abdominal: Soft.   Soft, appropriately TTP, midline firmness  Multiple areas of excoriation and skin breakdown noted to anterior abdomen   Neurological:   Not moaning in pain. Answers questions.   Skin: Skin is warm and dry.   Excoriations on abdomen and buttocks   Vitals reviewed.    Significant Labs:  CBC:   Recent Labs   Lab 02/28/19  0558   WBC 14.70*   RBC 3.49*   HGB 10.6*   HCT 35.7*      *   MCH 30.4   MCHC 29.7*     BMP:   Recent Labs   Lab 02/28/19  0558   *      K 4.3   CL 97   CO2 41*   BUN 20   CREATININE 0.7   CALCIUM 8.9   MG 1.8     CMP:   Recent Labs   Lab 02/28/19  0558   *   CALCIUM 8.9   ALBUMIN 1.7*   PROT 5.3*      K 4.3   CO2 41*   CL 97   BUN 20   CREATININE 0.7   ALKPHOS 4,004*   *   *   BILITOT 12.1*     LFTs:   Recent Labs   Lab 02/28/19  0558   *   *   ALKPHOS 4,004*   BILITOT 12.1*   PROT 5.3*   ALBUMIN 1.7*     Coagulation:   Recent Labs   Lab 02/27/19  0324   LABPROT 12.4   INR 1.2     ABGs:   Recent Labs   Lab 02/27/19  1852   PH 7.445   PCO2 64.7*   PO2 35*   HCO3 44.5*   POCSATURATED 67*   BE 20   Hida negative for acute cholecystitis.

## 2019-02-28 NOTE — ASSESSMENT & PLAN NOTE
-PT/OT; history of fall with non displaced oblique right tibia fracture at metaphysis into diaphysis. Was wearing brace.

## 2019-02-28 NOTE — PROGRESS NOTES
"[facility] Hospital Medicine  Progress Note    Patient Name: Sheryl Martines  MRN: [id number]  Patient Class: IP- Inpatient   Admission Date: 1/23/2019  Length of Stay: 35 days  Attending Physician: Aaron Lott MD  Primary Care Provider: [facility] Medicine Team: [facility] MED A Aaron Lott MD    Subjective:     Principal Problem:Acute on chronic diastolic (congestive) heart failure    HPI:  Sheryl Martines is a 64 yo female with medical history of asthma, COPD, CAD, h/o T2DM, hypertension, CVA 2012 with R sided deficits, HFpEF, severe debility, and lap converted to open appendectomy 8/2018 c/b C diff, FTT, and a round infection presents to the ED for acute on chronic increased generalized abdominal pain with associated NBNB emesis, nausea and decreased appetite.   Abd pain is normally 5/10 but today is 7/10.  Pain is described as a"ache" and is non-radiating, non-exertional and unrelated to PO intake. These symptoms are waxing and waning since 8/2018 and usually takes zofran for sxs.  Pt states she has had decreased appetite over the past few weeks, intermittently able to keep down solid food, but usually only able to eat things like chicken broth.    She came to ED today since she was weak described as "feeling bad".  Patient states last bowel movement was prior to admission, no diarrhea.  Patient's family unsure if patient has any recent fevers, but patient denies any f/c/night sweats.  Patient denies any vision changes, dizziness, LH, changes in urine output, dysuria, chest pain or shortness of breath.      In ED, HR elevated to 90-110s, BP stable, 100% on RA and afebrile. No leukocytosis but CRP 78 (ESR 17). T bili 1.7 (BL .7 to 1.1).  Alk phos 400s (BL).  Lipase normal, liver enzymes unremarkable. Ua shows 3 hyaline casts, 2+ protein, 1+ ketones, 1+ bilirubin.  Given reglan, zofran, protonix and duonebs in the ED for sxs.  CT A/P with contrast shows " ""Posterior right hemipelvis rim enhancing fluid collection, suspicious for abscess.  Possible associated retained appendicolith.  Adjacent urinary bladder wall thickening, suspicious for superimposed cystitis."  Gen surgery consulted and recs given. CT shows stability in size compared to CT on 9/30/18 and actually has decreased in size.  No emergent intervention recommended.     Hospital Course:  See problem list    Interval History:   Symptoms:   Same.  Diet:  Tolerating TF  Activity level:  Impaired due to weakness.    Pain:  No pain.       Review of Systems   Constitutional: Negative for fever.   Respiratory: Negative for shortness of breath.    Cardiovascular: Negative for chest pain.   Gastrointestinal: Negative for abdominal pain.     Objective:     Vital Signs (Most Recent):  Temp: 96.5 °F (35.8 °C) (02/28/19 1609)  Pulse: 105 (02/28/19 1609)  Resp: 20 (02/28/19 1609)  BP: (!) 105/57 (02/28/19 1609)  SpO2: 96 % (02/28/19 1609) Vital Signs (24h Range):  Temp:  [96.1 °F (35.6 °C)-99.6 °F (37.6 °C)] 96.5 °F (35.8 °C)  Pulse:  [] 105  Resp:  [13-20] 20  SpO2:  [94 %-100 %] 96 %  BP: (105-137)/(57-80) 105/57     Weight: 87.1 kg (192 lb)  Body mass index is 35.12 kg/m².    Intake/Output Summary (Last 24 hours) at 2/28/2019 1610  Last data filed at 2/28/2019 1415  Gross per 24 hour   Intake 1868 ml   Output 1620 ml   Net 248 ml      Physical Exam   Constitutional: No distress.   Neck: No JVD present.   Cardiovascular: Normal rate, regular rhythm and normal heart sounds.   Pulmonary/Chest: Effort normal. No respiratory distress. She has wheezes. She has rales.   Abdominal: Soft. Bowel sounds are normal. She exhibits no distension.   Musculoskeletal: She exhibits edema (2+).   Neurological: She is alert.       MELD-Na score: 18 at 2/28/2019  5:58 AM  MELD score: 18 at 2/28/2019  5:58 AM  Calculated from:  Serum Creatinine: 0.7 mg/dL (Rounded to 1 mg/dL) at 2/28/2019  5:58 AM  Serum Sodium: 145 mmol/L (Rounded to " 137 mmol/L) at 2/28/2019  5:58 AM  Total Bilirubin: 12.1 mg/dL at 2/28/2019  5:58 AM  INR(ratio): 1.2 at 2/27/2019  3:24 AM  Age: 63 years    Significant Labs:  CBC:  Recent Labs   Lab 02/27/19 0324 02/28/19  0558   WBC 13.87* 14.70*   HGB 10.6* 10.6*   HCT 36.3* 35.7*    192     CMP:  Recent Labs   Lab 02/27/19 0324 02/27/19  1828 02/28/19  0558    143 145   K 4.2 4.7 4.3   CL 94* 96 97   CO2 41* 41* 41*   * 196* 179*   BUN 20 20 20   CREATININE 0.8 0.6 0.7   CALCIUM 9.1 9.0 8.9   PROT 5.2* 5.4* 5.3*   ALBUMIN 1.7* 1.7* 1.7*   BILITOT 12.6* 12.9* 12.1*   ALKPHOS 4,454* 4,383* 4,004*   * 644* 422*   * 124* 104*   ANIONGAP 10 6* 7*   EGFRNONAA >60.0 >60.0 >60.0     PTINR:  Recent Labs   Lab 02/27/19 0324   INR 1.2           Assessment/Plan:      * Acute on chronic diastolic (congestive) heart failure    Patient's mental status improved in the ICU and is now on nasal cannula oxygen and BiPAP at night.  Medicine initially consulted for assistance with management and noted patient to be markedly fluid overloaded, estimated to be approximately net positive 16 L with this admission.  She had elevated CVP and TTE was consistent volume overload as well as her BNP of > 4900.   - IV diuresis with Lasix 40 bid  with improvement in urine output.       Generalized abdominal pain    Due to recent Pelvic abscess in female  CT abdomen showed a 4 x 3 cm enhancing fluid collection consistent with abscess.  She also had elevated lactate of 6.8.  She was admitted to the STICU service for initial management. Patient taken to OR on 01/25 for ex lap with drainage of abscess.  Postoperative course complicated by septic shock secondary to MRSA bacteremia for which she received 2 weeks of IV vancomycin and briefly required pressors, which have now been weaned off.  Wound culture also grew Bacteroides and patient completed 4 weeks of Cipro and Flagyl.  Patient remained intubated postoperatively and was  successfully extubated on 01/28.  Initially, she was stabilized and transferred to the floor.     On the floor, patient was making some progress and being followed by [facility]; however, a rapid response was called on 02/19 due to somnolence.  ABG was consistent with acute hypercapnic respiratory failure, and she was placed on BiPAP and transferred back to SICU.  Infectious workup was significant for a UA with 39 WBCs, moderate bacteria, and moderate yeast; urine culture ultimately grew Candida for which she was started on Diflucan IV.    - 2/26 ID consult recommended no antibiotics .    - monitor       Debility    Mostly wheelchair bound, but able to work with [facility] using walker  - Long-term acute care facility -abdominal wound requiring dressing, needs intensive PT, on tube feeds requiring extensive SLP.  - needs CHF and liver issue better first.     Dysphagia    She failed speech language therapy and was started on TPN with subsequent placement of IR guided G-tube in transition to tube feeds on 02/10.        Elevated liver enzymes    She was also found to have markedly elevated alk-phos, AST, and ALT.  Hepatology was consulted and felt acute liver injury related to infection versus medication, and not underlying liver disease. And ammonia was checked and was elevated, but hepatology recommended against treatment as they felt patient did not have hepatic encephalopathy.  Right upper quadrant ultrasound showed biliary sludge and gallbladder thickening but was negative for acute cholecystitis.  A subsequent HIDA scan was not consistent with acute cholecystitis; no surgical intervention needed per General surgery evaluation.    - re-consulted hepatology 2/27 who thought this could be drug induced.  DO not use cipro.  Recommend a trial of ursodiol 600mg BID to help with cholestasis.  If not better in two weeks may need biopsy.        Bradycardia    Around 2/22 pt was in junctional augusto. Cards saw pt and  recommended that rate control meds (diltiazem) be held.  Swapnil resolved.   - monitor rate             Acute respiratory failure with hypoxia and hypercarbia    Prone to hypercapnic narcosis  - wean o2  - bipap qhs and prn for AMS       Type 2 diabetes mellitus without complication, without long-term current use of insulin    -- acceptable; continue with current treatment & monitor        Moderate asthma without complication    Acute respiratory failure with hypoxia and hypercarbia  Pt prone to hypercapneic narcosis.  Steroid dependant asthma.  - c/w daily breo, theophylline, PRN duonebs  - qhs bipap and prn during the day     Hypomagnesemia    Replace prn       Severe malnutrition    - dietary consulted and appreciate recs; may require temporary TPN  - albumin 2.3 (chronic); will check prealbumin     Coronary artery disease involving native coronary artery of native heart without angina pectoris    - c/w ASA, plavix, statin  - no anginal equivalent       VTE Risk Mitigation (From admission, onward)        Ordered     heparin (porcine) injection 5,000 Units  Every 8 hours      02/18/19 0907     IP VTE HIGH RISK PATIENT  Once      01/25/19 1049     Place sequential compression device  Until discontinued      01/24/19 0027              Aaron Lott MD  Department of Hospital Medicine   [facility]

## 2019-02-28 NOTE — PLAN OF CARE
Problem: Adult Inpatient Plan of Care  Goal: Plan of Care Review  Outcome: Ongoing (interventions implemented as appropriate)  Plan of care reviewed, all questions and concerns addressed. Patient AAO x3, disoriented to situation and opens eyes to voice, answers questions appropriately. VS stable for patient on 2L NC & telemtry, w/ unlabored breathing. Patient nieves intact and patent and rectal tube in place. Abd incision slightly dehisced (MD notified). Foam dressings changed x1. Tube feedings continuous at 45mL w/ no  residual and no complaints of abd discomfort; water bolus given per order. Blood glucose monitoring maintained per order. Patient repositioned q2, pressure points protected. Patient remains free from falls or injury, bed locked lowest position, call light within reach. Nurse will continue to monitor patient.

## 2019-02-28 NOTE — CARE UPDATE
RN Proactive Rounding Note  Time of Visit: 911    Admit Date: 2019  LOS: 35  Code Status: Full Code   Date of Visit: 2019  : 1955  Age: 63 y.o.  Sex: female  Race: Black or   Bed: 1001/1001 A:   MRN: [id number]  Was the patient discharged from an ICU this admission? yes   Was the patient discharged from a PACU within last 24 hours?  no  Did the patient receive conscious sedation/general anesthesia in last 24 hours?  no  Was the patient in the ED within the past 24 hours?  no  Was the patient started on NIPPV within the past 24 hours?  no  Attending Physician: Aaron Lott MD  Primary Service: [facility]OS[facility] MED A      ASSESSMENT:     Abnormal Vital Signs: HR-112  Clinical Issues: Dysrythmia; Proactive rounding. Patient lying in bed. She is oriented x 3. Non-labored respirations on NC. HR-100s, sinus tachycardia on telemetry. No acute distress noted.     INTERVENTIONS/ RECOMMENDATIONS:     Continue current plan of care.     Discussed plan of care with Alfa HUNT.    PHYSICIAN ESCALATION:     Yes/No  no    Disposition: Remain in room 1001.    FOLLOW-UP/CONTINGENCY:     Call back the Rapid Response Nurse at x [phone] for additional questions or concerns.

## 2019-03-01 LAB
ALBUMIN SERPL BCP-MCNC: 1.8 G/DL
ALP SERPL-CCNC: 3023 U/L
ALT SERPL W/O P-5'-P-CCNC: 74 U/L
ANION GAP SERPL CALC-SCNC: 7 MMOL/L
AST SERPL-CCNC: 211 U/L
BASOPHILS # BLD AUTO: 0.05 K/UL
BASOPHILS NFR BLD: 0.3 %
BILIRUB SERPL-MCNC: 9.8 MG/DL
BUN SERPL-MCNC: 23 MG/DL
CALCIUM SERPL-MCNC: 8.9 MG/DL
CHLORIDE SERPL-SCNC: 94 MMOL/L
CO2 SERPL-SCNC: 41 MMOL/L
CREAT SERPL-MCNC: 0.7 MG/DL
DIFFERENTIAL METHOD: ABNORMAL
EOSINOPHIL # BLD AUTO: 0.4 K/UL
EOSINOPHIL NFR BLD: 2.6 %
ERYTHROCYTE [DISTWIDTH] IN BLOOD BY AUTOMATED COUNT: 21.3 %
EST. GFR  (AFRICAN AMERICAN): >60 ML/MIN/1.73 M^2
EST. GFR  (NON AFRICAN AMERICAN): >60 ML/MIN/1.73 M^2
GLUCOSE SERPL-MCNC: 154 MG/DL
HCT VFR BLD AUTO: 33.7 %
HGB BLD-MCNC: 9.9 G/DL
IMM GRANULOCYTES # BLD AUTO: 0.1 K/UL
IMM GRANULOCYTES NFR BLD AUTO: 0.7 %
LYMPHOCYTES # BLD AUTO: 4.3 K/UL
LYMPHOCYTES NFR BLD: 28.9 %
MAGNESIUM SERPL-MCNC: 1.7 MG/DL
MCH RBC QN AUTO: 30.4 PG
MCHC RBC AUTO-ENTMCNC: 29.4 G/DL
MCV RBC AUTO: 103 FL
MONOCYTES # BLD AUTO: 1.9 K/UL
MONOCYTES NFR BLD: 12.6 %
NEUTROPHILS # BLD AUTO: 8.2 K/UL
NEUTROPHILS NFR BLD: 54.9 %
NRBC BLD-RTO: 5 /100 WBC
PHOSPHATE SERPL-MCNC: 3.7 MG/DL
PLATELET # BLD AUTO: 177 K/UL
PMV BLD AUTO: 12.7 FL
POCT GLUCOSE: 155 MG/DL (ref 70–110)
POCT GLUCOSE: 196 MG/DL (ref 70–110)
POCT GLUCOSE: 212 MG/DL (ref 70–110)
POTASSIUM SERPL-SCNC: 4.5 MMOL/L
PROT SERPL-MCNC: 5.8 G/DL
RBC # BLD AUTO: 3.26 M/UL
SODIUM SERPL-SCNC: 142 MMOL/L
WBC # BLD AUTO: 14.88 K/UL

## 2019-03-01 PROCEDURE — 94660 CPAP INITIATION&MGMT: CPT

## 2019-03-01 PROCEDURE — 63600175 PHARM REV CODE 636 W HCPCS: Performed by: STUDENT IN AN ORGANIZED HEALTH CARE EDUCATION/TRAINING PROGRAM

## 2019-03-01 PROCEDURE — 97112 NEUROMUSCULAR REEDUCATION: CPT

## 2019-03-01 PROCEDURE — 99232 PR SUBSEQUENT HOSPITAL CARE,LEVL II: ICD-10-PCS | Mod: ,,, | Performed by: HOSPITALIST

## 2019-03-01 PROCEDURE — 94664 DEMO&/EVAL PT USE INHALER: CPT

## 2019-03-01 PROCEDURE — 97530 THERAPEUTIC ACTIVITIES: CPT

## 2019-03-01 PROCEDURE — 25000242 PHARM REV CODE 250 ALT 637 W/ HCPCS: Performed by: HOSPITALIST

## 2019-03-01 PROCEDURE — 84100 ASSAY OF PHOSPHORUS: CPT

## 2019-03-01 PROCEDURE — 25000003 PHARM REV CODE 250: Performed by: HOSPITALIST

## 2019-03-01 PROCEDURE — 92526 ORAL FUNCTION THERAPY: CPT

## 2019-03-01 PROCEDURE — 99232 SBSQ HOSP IP/OBS MODERATE 35: CPT | Mod: ,,, | Performed by: HOSPITALIST

## 2019-03-01 PROCEDURE — 85025 COMPLETE CBC W/AUTO DIFF WBC: CPT

## 2019-03-01 PROCEDURE — 94761 N-INVAS EAR/PLS OXIMETRY MLT: CPT

## 2019-03-01 PROCEDURE — 63600175 PHARM REV CODE 636 W HCPCS: Performed by: HOSPITALIST

## 2019-03-01 PROCEDURE — 83735 ASSAY OF MAGNESIUM: CPT

## 2019-03-01 PROCEDURE — 80053 COMPREHEN METABOLIC PANEL: CPT

## 2019-03-01 PROCEDURE — 27000221 HC OXYGEN, UP TO 24 HOURS

## 2019-03-01 PROCEDURE — 25000003 PHARM REV CODE 250: Performed by: STUDENT IN AN ORGANIZED HEALTH CARE EDUCATION/TRAINING PROGRAM

## 2019-03-01 PROCEDURE — 11000001 HC ACUTE MED/SURG PRIVATE ROOM

## 2019-03-01 PROCEDURE — 97535 SELF CARE MNGMENT TRAINING: CPT

## 2019-03-01 PROCEDURE — 94640 AIRWAY INHALATION TREATMENT: CPT

## 2019-03-01 PROCEDURE — 99900035 HC TECH TIME PER 15 MIN (STAT)

## 2019-03-01 PROCEDURE — 36415 COLL VENOUS BLD VENIPUNCTURE: CPT

## 2019-03-01 RX ORDER — FUROSEMIDE 10 MG/ML
40 INJECTION INTRAMUSCULAR; INTRAVENOUS 2 TIMES DAILY
Status: DISCONTINUED | OUTPATIENT
Start: 2019-03-01 | End: 2019-03-04

## 2019-03-01 RX ORDER — FUROSEMIDE 40 MG/1
40 TABLET ORAL DAILY
Status: DISCONTINUED | OUTPATIENT
Start: 2019-03-02 | End: 2019-03-01

## 2019-03-01 RX ADMIN — HYDROMORPHONE HYDROCHLORIDE 0.5 MG: 1 INJECTION, SOLUTION INTRAMUSCULAR; INTRAVENOUS; SUBCUTANEOUS at 07:03

## 2019-03-01 RX ADMIN — IPRATROPIUM BROMIDE AND ALBUTEROL SULFATE 3 ML: .5; 3 SOLUTION RESPIRATORY (INHALATION) at 09:03

## 2019-03-01 RX ADMIN — URSODIOL 500 MG: 250 TABLET, FILM COATED ORAL at 09:03

## 2019-03-01 RX ADMIN — PANTOPRAZOLE SODIUM 40 MG: 40 GRANULE, DELAYED RELEASE ORAL at 09:03

## 2019-03-01 RX ADMIN — URSODIOL 500 MG: 250 TABLET, FILM COATED ORAL at 10:03

## 2019-03-01 RX ADMIN — MICONAZOLE NITRATE: 20 OINTMENT TOPICAL at 09:03

## 2019-03-01 RX ADMIN — INSULIN ASPART 4 UNITS: 100 INJECTION, SOLUTION INTRAVENOUS; SUBCUTANEOUS at 04:03

## 2019-03-01 RX ADMIN — FUROSEMIDE 40 MG: 10 INJECTION, SOLUTION INTRAVENOUS at 09:03

## 2019-03-01 RX ADMIN — DULOXETINE 60 MG: 60 CAPSULE, DELAYED RELEASE ORAL at 09:03

## 2019-03-01 RX ADMIN — ASPIRIN 81 MG CHEWABLE TABLET 81 MG: 81 TABLET CHEWABLE at 09:03

## 2019-03-01 RX ADMIN — HEPARIN SODIUM 5000 UNITS: 5000 INJECTION, SOLUTION INTRAVENOUS; SUBCUTANEOUS at 10:03

## 2019-03-01 RX ADMIN — HEPARIN SODIUM 5000 UNITS: 5000 INJECTION, SOLUTION INTRAVENOUS; SUBCUTANEOUS at 05:03

## 2019-03-01 RX ADMIN — PREDNISONE 10 MG: 5 TABLET ORAL at 09:03

## 2019-03-01 RX ADMIN — MICONAZOLE NITRATE: 20 OINTMENT TOPICAL at 10:03

## 2019-03-01 RX ADMIN — THEOPHYLLINE ANHYDROUS 300 MG: 100 CAPSULE, EXTENDED RELEASE ORAL at 09:03

## 2019-03-01 RX ADMIN — IPRATROPIUM BROMIDE AND ALBUTEROL SULFATE 3 ML: .5; 3 SOLUTION RESPIRATORY (INHALATION) at 02:03

## 2019-03-01 RX ADMIN — FLUTICASONE FUROATE AND VILANTEROL TRIFENATATE 1 PUFF: 100; 25 POWDER RESPIRATORY (INHALATION) at 09:03

## 2019-03-01 RX ADMIN — Medication 400 MG: at 09:03

## 2019-03-01 RX ADMIN — HEPARIN SODIUM 5000 UNITS: 5000 INJECTION, SOLUTION INTRAVENOUS; SUBCUTANEOUS at 01:03

## 2019-03-01 RX ADMIN — POTASSIUM & SODIUM PHOSPHATES POWDER PACK 280-160-250 MG 1 PACKET: 280-160-250 PACK at 07:03

## 2019-03-01 RX ADMIN — Medication 400 MG: at 10:03

## 2019-03-01 RX ADMIN — CLOPIDOGREL 75 MG: 75 TABLET, FILM COATED ORAL at 09:03

## 2019-03-01 RX ADMIN — HYDROMORPHONE HYDROCHLORIDE 0.5 MG: 1 INJECTION, SOLUTION INTRAMUSCULAR; INTRAVENOUS; SUBCUTANEOUS at 02:03

## 2019-03-01 NOTE — PROGRESS NOTES
Wound care follow-up- consulted for yellow pus from midline incision by RN  The patient is on a sizewise Immerse mattress on evolution bed, heel protectors in place, urinary catheter and BMS in place for moisture control.   The midline incision is approximated at superior aspect- staples in place and dehisced at distal aspect with loose staples, yellow slough in wound bed with red tissue, no odor, and black eschar to dehisced incision around umbilicus with staples in eschar. The wound bed was cleansed with sterile normal saline, no purulent drainage, no erythema. Loose staples removed without distress. Wound packed with Triad ointment on gauze then covered with a telfa island dressing. The pannus is moist/intact- area cleansed/dried.   The RLQ wound is open with yellow/tan/black slough and red moist tissue, remains at 4 cm Depth. The wound edges have hard slough on the bottom opening. The wound was cleansed with sterile normal saline and packed with Triad ointment on gauze, covered with a telfa island dressing. No erythema, no purulent drainage. Both wounds show signs of slow healing and the treatment plan will be changed to Santyl ointment.  Discussed above with Dr. VIVIANA Ott and approved recommendations and removal of loose staples    Recommendations;  1. RLQ and Midline incision- pack with Santyl daily, cover with telfa island dressing  2. Umbilicus area at midline incision- paint with betadine daily  3. Buttocks- Triad ointment BID/prn for moisture control  4. Perineal- antifungal ointment BID  5. HAPI bundle- Immerse, heel protectors, wedge, urinary catheter, BMS, turn q 2  Wound care will follow prn     03/01/19 1430       Incision/Site 01/25/19 1058 Right Abdomen   Date First Assessed/Time First Assessed: 01/25/19 1058   Side: Right  Location: Abdomen   Wound Image    Incision WDL ex   Dressing Appearance Open to air   Drainage Amount Small   Drainage Characteristics/Odor Yellow;Tan;Creamy   Appearance  Red;Yellow;Slough   Red (%), Wound Tissue Color 75 %   Yellow (%), Wound Tissue Color 25 %   Periwound Area Intact;Dry   Wound Edges Open   Wound Length (cm) 1 cm   Wound Width (cm) 5 cm   Wound Depth (cm) 4 cm   Wound Volume (cm^3) 20 cm^3   Wound Surface Area (cm^2) 5 cm^2   Care Cleansed with:;Sterile normal saline;Applied:;Skin Barrier   Dressing Changed;Island/border;Gauze   Packing Incision packed with  (triad on gauze)   Dressing Change Due 03/02/19       Incision/Site 01/25/19 1058 Abdomen   Date First Assessed/Time First Assessed: 01/25/19 1058   Location: Abdomen   Incision WDL ex   Dressing Appearance Open to air;No dressing   Drainage Amount Scant   Appearance Tan;Yellow;Red;Eschar  (staples loose to distal )   Red (%), Wound Tissue Color 75 %   Yellow (%), Wound Tissue Color 25 %   Periwound Area Intact;Dry   Wound Edges Dehisced   Wound Length (cm) 4 cm   Wound Width (cm) 1.3 cm   Wound Depth (cm) 3 cm   Wound Volume (cm^3) 15.6 cm^3   Wound Surface Area (cm^2) 5.2 cm^2   Care Cleansed with:;Sterile normal saline;Applied:;Skin Barrier   Dressing Applied;Gauze;Island/border  (triad ointment on gauze)   Packing Incision packed with  (triad ointment on gauze)   Packing Inserted  1   Packing Removed 0   Dressing Change Due 03/02/19   Skin Interventions   Device Skin Pressure Protection absorbent pad utilized/changed;positioning supports utilized;pressure points protected   Pressure Reduction Devices specialty bed utilized;pressure-redistributing mattress utilized;positioning supports utilized;heel offloading device utilized   Pressure Reduction Techniques weight shift assistance provided;frequent weight shift encouraged;heels elevated off bed;positioned off wounds;pressure points protected   Skin Protection tubing/devices free from skin contact;skin sealant/moisture barrier applied

## 2019-03-01 NOTE — TREATMENT PLAN
Chart reviewed.    Patient's LFT's TBili/ALKP/AST, improving on ursodiol.  Can resume medication until LFT's normalize.   If the patient LFT's are abnormal at time of discharge, she will need close lab follow up, and clinic follow up with hepatology. Please contact hepatology prior to discharge to arrange follow up if that is the case.

## 2019-03-01 NOTE — PLAN OF CARE
Problem: Occupational Therapy Goal  Goal: Occupational Therapy Goal  Goals to be met by: 3/12/19    Patient will increase functional independence with ADLs by performing:    Feeding with Set-up Assistance.  UE Dressing with Set-up Assistance.  LE Dressing with Moderate Assistance with AD as needed.  Grooming while seated with Set-up Assistance.  Toileting from bedside commode with Maximum Assistance for hygiene and clothing management.   Toilet transfer to bedside commode with Maximum Assistance.  Sitting EOB ~10 min with min A        Outcome: Ongoing (interventions implemented as appropriate)  Queta Nolan, OT  3/1/19

## 2019-03-01 NOTE — PLAN OF CARE
Problem: Physical Therapy Goal  Goal: Physical Therapy Goal  Goals to be met by:3/8/19    Patient will increase functional independence with mobility by performin. Supine to sit with Moderate Assistance -not met  2. Sit to stand transfer with Maximum Assistance -not met  3. Bed to chair transfer with Maximum Assistance -not met  4. Sitting at edge of bed x10 minutes with Contact Guard Assistance - not met  5. Lower extremity exercise program x10 reps, with assistance as needed - not met         Outcome: Ongoing (interventions implemented as appropriate)  Goals remain appropriate.

## 2019-03-01 NOTE — PT/OT/SLP PROGRESS
"Physical Therapy Treatment - cotx with OT     Patient Name:  Sheryl Martines   MRN:  [id number]    Recommendations:     Discharge Recommendations:  nursing facility, skilled   Discharge Equipment Recommendations: hospital bed   Barriers to discharge: Decreased caregiver support    Assessment:     Sheryl Martines is a 63 y.o. female admitted with a medical diagnosis of Acute on chronic diastolic (congestive) heart failure.  She presents with the following impairments/functional limitations:  weakness, impaired endurance, impaired self care skills, impaired functional mobilty, gait instability, impaired balance, decreased upper extremity function, decreased lower extremity function, pain. Pt tolerated session well with focus on bed mobility, EOB balance/endurance, and standing trials. Pt continues to requires significant assistance for mobility and requires constant cues for alertness and focus to tasks. Pt tendency to extend trunk causing posterior lean and R lateral lean requiring increasing assistance to maintain balance and safety. Pt will continue to benefit from therapy services to improve impairments listed above.     Rehab Prognosis: Fair; patient would benefit from acute skilled PT services to address these deficits and reach maximum level of function.    Recent Surgery: Procedure(s) (LRB):  LAPAROTOMY, EXPLORATORY (N/A)  INCISION AND DRAINAGE, ABSCESS-  drainage of pelvic abscess (N/A)  EXCISION, ABSCESS- drainage abdominal wall abscess (N/A)  APPLICATION, WOUND VAC (N/A) 35 Days Post-Op    Plan:     During this hospitalization, patient to be seen 3 x/week to address the identified rehab impairments via gait training, therapeutic activities, therapeutic exercises, neuromuscular re-education and progress toward the following goals:    · Plan of Care Expires:  03/24/19    Subjective     Chief Complaint: fatigue/BLE pain  Patient/Family Comments/goals: "I was comfortable before y'all came " "in."  Pain/Comfort:  ·  No rating provided for BLE pain      Objective:     Communicated with NSG prior to session.  Patient found all lines intact, call button in reach and pt sleeping in supine despite wedge support to roll pt to R side with  telemetry, bowel management system, nieves catheter, pressure relief boots, peripheral IV, oxygen  upon PTA entry to room.     General Precautions: Standard, aspiration, fall, NPO   Orthopedic Precautions:N/A   Braces: N/A     Functional Mobility:  · Bed Mobility:     · Supine to Sit: maximal assistance  · Sit to Supine: maximal assistance  · Transfers:     · Sit to Stand:  total assistance and of 2 persons with no AD  · Balance: Pt sits at EOB with CGA-Max A for static sitting balance. Pt with tendency for R posterolateral lean.       AM-PAC 6 CLICK MOBILITY  Turning over in bed (including adjusting bedclothes, sheets and blankets)?: 2  Sitting down on and standing up from a chair with arms (e.g., wheelchair, bedside commode, etc.): 2  Moving from lying on back to sitting on the side of the bed?: 2  Moving to and from a bed to a chair (including a wheelchair)?: 1  Need to walk in hospital room?: 1  Climbing 3-5 steps with a railing?: 1  Basic Mobility Total Score: 9       Therapeutic Activities and Exercises:   Pt assisted with functional mobility as noted above.   Pt sits at EOB for 20 minutes with CGA-Max A for balance and safety with PTA providing hand over hand cues to UE support and weight shifting to R/L laterally to correct trunk positioning for better midline stability.   Pt educated on importance of participation with therapy services, increased time upright/awake/alert during day, and PT POC.     Patient left supine with all lines intact, call button in reach, NSG notified and wound care nurse present.    GOALS:   Multidisciplinary Problems     Physical Therapy Goals        Problem: Physical Therapy Goal    Goal Priority Disciplines Outcome Goal Variances " Interventions   Physical Therapy Goal     PT, PT/OT Ongoing (interventions implemented as appropriate)     Description:  Goals to be met by:3/8/19    Patient will increase functional independence with mobility by performin. Supine to sit with Moderate Assistance -not met  2. Sit to stand transfer with Maximum Assistance -not met  3. Bed to chair transfer with Maximum Assistance -not met  4. Sitting at edge of bed x10 minutes with Contact Guard Assistance - not met  5. Lower extremity exercise program x10 reps, with assistance as needed - not met                          Time Tracking:     PT Received On: 19  PT Start Time: 1357     PT Stop Time: 1430  PT Total Time (min): 33 min     Billable Minutes: Therapeutic Activity 15 and Neuromuscular Re-education 8 Total Time 10    Treatment Type: Treatment  PT/PTA: PTA     PTA Visit Number: 1     Diogo Mirza PTA  2019

## 2019-03-01 NOTE — PT/OT/SLP PROGRESS
Occupational Therapy   Treatment    Name: Sheryl Martines  MRN: [id number]  Admitting Diagnosis:  Acute on chronic diastolic (congestive) heart failure  35 Days Post-Op    Recommendations:     Discharge Recommendations: nursing facility, skilled  Discharge Equipment Recommendations:  hospital bed  Barriers to discharge:  Decreased caregiver support    Assessment:     Sheryl Martines is a 63 y.o. female with a medical diagnosis of Acute on chronic diastolic (congestive) heart failure.  She presents with decreased level of arousal and the following performance deficits affecting function are weakness, impaired endurance, impaired self care skills, impaired functional mobilty, impaired balance, decreased upper extremity function, decreased lower extremity function, pain, impaired joint extensibility.     Rehab Prognosis:  Good; patient would benefit from acute skilled OT services to address these deficits and reach maximum level of function.       Plan:     Patient to be seen 3 x/week to address the above listed problems via self-care/home management, therapeutic activities, therapeutic exercises  · Plan of Care Expires: 03/28/19  · Plan of Care Reviewed with: patient    Subjective     Pain/Comfort:  · Pain Rating 1: (Patient did not quantify)  · Location - Side 1: Left  · Location - Orientation 1: generalized  · Location 1: leg  · Pain Addressed 1: Distraction, Reposition, Nurse notified    Objective:     Communicated with: Nursing prior to session.  Patient found supine with telemetry, bowel management system, pressure relief boots, peripheral IV, oxygen upon OT entry to room.    General Precautions: Standard, aspiration, fall, NPO   Orthopedic Precautions:N/A   Braces: N/A     Occupational Performance:     Bed Mobility:    · Patient completed Rolling/Turning to Left with  maximal assistance  · Patient completed Rolling/Turning to Right with maximal assistance  · Patient completed Scooting/Bridging with maximal  assistance  · Patient completed Supine to Sit with maximal assistance  · Patient completed Sit to Supine with maximal assistance     Functional Mobility/Transfers:  · Patient completed Sit <> Stand Transfer with total assistance with 2 helpers with no assistive device     Activities of Daily Living:  · Grooming: minimum assistance in supine, impaired coordination and tremulous RUE  · Upper Body Dressing: moderate assistance seated EOB  · Lower Body Dressing: dependence        [facility] 6 Click ADL: 10    Treatment & Education:  Pt educated on roles/goals of OT and POC.  Pt sat edge of bed ~20 minutes with varying level of assist of CGA - max A due to impaired balance, R posterolateral sway and continuous cues to maintain wakeful state/eyes open.  Pt completed sit <> stand trials x 2 with total A to clear buttocks from bed, therapists blocking B knees, and tactile cues for anterior weight shift, hip extension and neck extension.      Patient left supine with all lines intact, call button in reach and nsg notifiedEducation:      GOALS:   Multidisciplinary Problems     Occupational Therapy Goals        Problem: Occupational Therapy Goal    Goal Priority Disciplines Outcome Interventions   Occupational Therapy Goal     OT, PT/OT Ongoing (interventions implemented as appropriate)    Description:  Goals to be met by: 3/12/19    Patient will increase functional independence with ADLs by performing:    Feeding with Set-up Assistance.  UE Dressing with Set-up Assistance.  LE Dressing with Moderate Assistance with AD as needed.  Grooming while seated with Set-up Assistance.  Toileting from bedside commode with Maximum Assistance for hygiene and clothing management.   Toilet transfer to bedside commode with Maximum Assistance.  Sitting EOB ~10 min with min A                         Time Tracking:     OT Date of Treatment: 03/01/19  OT Start Time: 1357  OT Stop Time: 1430  OT Total Time (min): 33 min    Billable Minutes:Self  Care/Home Management 8  Therapeutic Activity 15    Queta Nolan, OT  3/1/2019

## 2019-03-01 NOTE — PT/OT/SLP PROGRESS
"Speech Language Pathology Treatment    Patient Name:  Sheryl Martines   MRN:  [id number]   1146/1146 A    Admitting Diagnosis: Acute on chronic diastolic (congestive) heart failure    Recommendations:                 General Recommendations:  Dysphagia therapy  Diet recommendations:  NPO, Liquid Diet Level: NPO   Aspiration Precautions: Continue alternate means of nutrition and Strict aspiration precautions   EXCELLENT AND FREQUENT ORAL CARE  General Precautions: Standard, aspiration, fall, NPO  Communication strategies:  provide increased time to answer and go to room if call light pushed    Subjective     Patient awake.   Patient states, "When are you michael give me some water?"    Objective:     Has the patient been evaluated by SLP for swallowing?   Yes  Keep patient NPO? Yes   Current Respiratory Status: nasal cannula      Patient seen for continued swallow assessment and dysphagia therapy. Patient was sitting up in bed during session. Similarly to prior sessions, patient reported pain when HOB was elevated, but she agreed to participate in session. Patient with noted thick sticky and dried secretions in throat and oral cavity. No suctioning, toothbrush, toothpaste, or oral swabs present in room. SLP obtained oral care supplies and set up suctioning. Copious secretions suctioned and removed from oral cavity. Patient with wet coughing throughout oral care. She participated in minimal swallowing exercises including hard /g/ and /k/ and falsetto. Patient assessed with ice chips x4 and tsp sips of water x2. Patient initially with no overt s/s of aspiration, however, delayed and wet coughing noted upon attempt to vocalize s/p all trials. Suctioning provided. Patient with increased lethargy and session ended.     Assessment:     Sheryl Martines is a 63 y.o. female with an SLP diagnosis of Dysphagia.      Goals:   Multidisciplinary Problems     SLP Goals        Problem: SLP Goal    Goal Priority Disciplines Outcome "   SLP Goal     SLP Ongoing (interventions implemented as appropriate)   Description:  Speech Language Pathology Goals  Goals expected to be met by 3/7/19  1. Pt will participate in further, instrumental assessment via MBSS when appropriate.   2. Educate Pt and family on S/S aspiration and aspiration precautions   3. Pt will complete dysphagia exercises x10 ea with good effort 90% of attempts to improve BOT coordination and pharyngeal strength/coordination.                                  Plan:     · Patient to be seen:  3 x/week   · Plan of Care expires:  03/21/19  · Plan of Care reviewed with:  patient   · SLP Follow-Up:  Yes       Discharge recommendations:  nursing facility, skilled   Barriers to Discharge:  Level of Skilled Assistance Needed     Time Tracking:     SLP Treatment Date:   03/01/19  Speech Start Time:  1011  Speech Stop Time:  1035     Speech Total Time (min):  24 min    Billable Minutes: Treatment Swallowing Dysfunction 24    RAJWINDER Mariscal, CCC-SLP  Pager: [phone]   03/01/2019

## 2019-03-01 NOTE — PLAN OF CARE
03/01/19 1412   Post-Acute Status   Post-Acute Authorization Placement   Post-Acute Placement Status Patient Evaluation by Facility     Referral under review by Shelly at [facility]. ION sent message to Kevin at [facility] to f/u on referral.    Sonja Churchill, RONNIW [phone]

## 2019-03-01 NOTE — PLAN OF CARE
Problem: SLP Goal  Goal: SLP Goal  Speech Language Pathology Goals  Goals expected to be met by 3/7/19  1. Pt will participate in further, instrumental assessment via MBSS when appropriate.   2. Educate Pt and family on S/S aspiration and aspiration precautions   3. Pt will complete dysphagia exercises x10 ea with good effort 90% of attempts to improve BOT coordination and pharyngeal strength/coordination.                 Outcome: Ongoing (interventions implemented as appropriate)  Goals remain appropriate. ST will continue to follow. RECOMMEND EXCELLENT AND FREQUENT ORAL CARE.   NAVID Mariscal., CCC-SLP  Pager: [phone]  03/01/2019

## 2019-03-01 NOTE — PLAN OF CARE
Problem: Adult Inpatient Plan of Care  Goal: Plan of Care Review  Outcome: Ongoing (interventions implemented as appropriate)   03/01/19 3453   Plan of Care Review   Plan of Care Reviewed With patient   Pt remains free from falls and injuries during shift. Awake, alert, and oriented x 3. Vital signs stable. Oxygen level WNL with nasal cannula or Bipap in place. PRN medication given for itching and pain. Wound consult placed. No residuals noted, tube feedings tolerated well. No signs of acute distress noted at this time. Bed in lowest position, wheels locked. Call light in reach. Will continue to monitor. Safety maintained.

## 2019-03-02 LAB
ALBUMIN SERPL BCP-MCNC: 1.9 G/DL
ALP SERPL-CCNC: 2740 U/L
ALT SERPL W/O P-5'-P-CCNC: 65 U/L
ANION GAP SERPL CALC-SCNC: 11 MMOL/L
AST SERPL-CCNC: 168 U/L
BACTERIA #/AREA URNS AUTO: ABNORMAL /HPF
BASOPHILS # BLD AUTO: 0.06 K/UL
BASOPHILS NFR BLD: 0.4 %
BILIRUB SERPL-MCNC: 8.2 MG/DL
BILIRUB UR QL STRIP: ABNORMAL
BNP SERPL-MCNC: 2672 PG/ML
BUN SERPL-MCNC: 20 MG/DL
CALCIUM SERPL-MCNC: 9.7 MG/DL
CHLORIDE SERPL-SCNC: 93 MMOL/L
CLARITY UR REFRACT.AUTO: CLEAR
CO2 SERPL-SCNC: 40 MMOL/L
COLOR UR AUTO: ABNORMAL
CREAT SERPL-MCNC: 0.7 MG/DL
DIFFERENTIAL METHOD: ABNORMAL
EOSINOPHIL # BLD AUTO: 0.8 K/UL
EOSINOPHIL NFR BLD: 5.4 %
ERYTHROCYTE [DISTWIDTH] IN BLOOD BY AUTOMATED COUNT: 21.5 %
EST. GFR  (AFRICAN AMERICAN): >60 ML/MIN/1.73 M^2
EST. GFR  (NON AFRICAN AMERICAN): >60 ML/MIN/1.73 M^2
GLUCOSE SERPL-MCNC: 105 MG/DL
GLUCOSE UR QL STRIP: NEGATIVE
HCT VFR BLD AUTO: 35.9 %
HGB BLD-MCNC: 10.4 G/DL
HGB UR QL STRIP: NEGATIVE
IMM GRANULOCYTES # BLD AUTO: 0.1 K/UL
IMM GRANULOCYTES NFR BLD AUTO: 0.7 %
KETONES UR QL STRIP: ABNORMAL
LEUKOCYTE ESTERASE UR QL STRIP: ABNORMAL
LYMPHOCYTES # BLD AUTO: 5.4 K/UL
LYMPHOCYTES NFR BLD: 35.4 %
MAGNESIUM SERPL-MCNC: 1.7 MG/DL
MCH RBC QN AUTO: 30.7 PG
MCHC RBC AUTO-ENTMCNC: 29 G/DL
MCV RBC AUTO: 106 FL
MICROSCOPIC COMMENT: ABNORMAL
MONOCYTES # BLD AUTO: 1.9 K/UL
MONOCYTES NFR BLD: 12.6 %
NEUTROPHILS # BLD AUTO: 6.9 K/UL
NEUTROPHILS NFR BLD: 45.5 %
NITRITE UR QL STRIP: NEGATIVE
NRBC BLD-RTO: 4 /100 WBC
PH UR STRIP: 7 [PH] (ref 5–8)
PHOSPHATE SERPL-MCNC: 2 MG/DL
PLATELET # BLD AUTO: 191 K/UL
PMV BLD AUTO: 12.6 FL
POCT GLUCOSE: 139 MG/DL (ref 70–110)
POCT GLUCOSE: 178 MG/DL (ref 70–110)
POCT GLUCOSE: 246 MG/DL (ref 70–110)
POTASSIUM SERPL-SCNC: 3.4 MMOL/L
PROT SERPL-MCNC: 6.5 G/DL
PROT UR QL STRIP: NEGATIVE
RBC # BLD AUTO: 3.39 M/UL
RBC #/AREA URNS AUTO: 2 /HPF (ref 0–4)
SODIUM SERPL-SCNC: 144 MMOL/L
SP GR UR STRIP: 1.02 (ref 1–1.03)
SQUAMOUS #/AREA URNS AUTO: 0 /HPF
URN SPEC COLLECT METH UR: ABNORMAL
WBC # BLD AUTO: 15.2 K/UL
WBC #/AREA URNS AUTO: 12 /HPF (ref 0–5)

## 2019-03-02 PROCEDURE — 87086 URINE CULTURE/COLONY COUNT: CPT

## 2019-03-02 PROCEDURE — 84100 ASSAY OF PHOSPHORUS: CPT

## 2019-03-02 PROCEDURE — 27000646 HC AEROBIKA DEVICE

## 2019-03-02 PROCEDURE — 27000221 HC OXYGEN, UP TO 24 HOURS

## 2019-03-02 PROCEDURE — 25000003 PHARM REV CODE 250: Performed by: PHYSICIAN ASSISTANT

## 2019-03-02 PROCEDURE — 99232 SBSQ HOSP IP/OBS MODERATE 35: CPT | Mod: ,,, | Performed by: HOSPITALIST

## 2019-03-02 PROCEDURE — 63600175 PHARM REV CODE 636 W HCPCS: Performed by: STUDENT IN AN ORGANIZED HEALTH CARE EDUCATION/TRAINING PROGRAM

## 2019-03-02 PROCEDURE — 83735 ASSAY OF MAGNESIUM: CPT

## 2019-03-02 PROCEDURE — 63600175 PHARM REV CODE 636 W HCPCS: Performed by: PHYSICIAN ASSISTANT

## 2019-03-02 PROCEDURE — 80053 COMPREHEN METABOLIC PANEL: CPT

## 2019-03-02 PROCEDURE — 99232 PR SUBSEQUENT HOSPITAL CARE,LEVL II: ICD-10-PCS | Mod: ,,, | Performed by: HOSPITALIST

## 2019-03-02 PROCEDURE — 36415 COLL VENOUS BLD VENIPUNCTURE: CPT

## 2019-03-02 PROCEDURE — 81001 URINALYSIS AUTO W/SCOPE: CPT

## 2019-03-02 PROCEDURE — 11000001 HC ACUTE MED/SURG PRIVATE ROOM

## 2019-03-02 PROCEDURE — 85025 COMPLETE CBC W/AUTO DIFF WBC: CPT

## 2019-03-02 PROCEDURE — 94799 UNLISTED PULMONARY SVC/PX: CPT

## 2019-03-02 PROCEDURE — 94664 DEMO&/EVAL PT USE INHALER: CPT

## 2019-03-02 PROCEDURE — 94660 CPAP INITIATION&MGMT: CPT

## 2019-03-02 PROCEDURE — 63600175 PHARM REV CODE 636 W HCPCS: Performed by: HOSPITALIST

## 2019-03-02 PROCEDURE — 94668 MNPJ CHEST WALL SBSQ: CPT

## 2019-03-02 PROCEDURE — 25000003 PHARM REV CODE 250: Performed by: HOSPITALIST

## 2019-03-02 PROCEDURE — 25000242 PHARM REV CODE 250 ALT 637 W/ HCPCS: Performed by: HOSPITALIST

## 2019-03-02 PROCEDURE — 94640 AIRWAY INHALATION TREATMENT: CPT

## 2019-03-02 PROCEDURE — 99900035 HC TECH TIME PER 15 MIN (STAT)

## 2019-03-02 PROCEDURE — 83880 ASSAY OF NATRIURETIC PEPTIDE: CPT

## 2019-03-02 PROCEDURE — 94761 N-INVAS EAR/PLS OXIMETRY MLT: CPT

## 2019-03-02 PROCEDURE — 25000003 PHARM REV CODE 250: Performed by: STUDENT IN AN ORGANIZED HEALTH CARE EDUCATION/TRAINING PROGRAM

## 2019-03-02 RX ORDER — POTASSIUM CHLORIDE 20 MEQ/15ML
40 SOLUTION ORAL ONCE
Status: DISCONTINUED | OUTPATIENT
Start: 2019-03-02 | End: 2019-03-02

## 2019-03-02 RX ORDER — SODIUM,POTASSIUM PHOSPHATES 280-250MG
1 POWDER IN PACKET (EA) ORAL
Status: DISCONTINUED | OUTPATIENT
Start: 2019-03-02 | End: 2019-03-02

## 2019-03-02 RX ADMIN — HYDROMORPHONE HYDROCHLORIDE 0.5 MG: 1 INJECTION, SOLUTION INTRAMUSCULAR; INTRAVENOUS; SUBCUTANEOUS at 03:03

## 2019-03-02 RX ADMIN — HEPARIN SODIUM 5000 UNITS: 5000 INJECTION, SOLUTION INTRAVENOUS; SUBCUTANEOUS at 05:03

## 2019-03-02 RX ADMIN — ASPIRIN 81 MG CHEWABLE TABLET 81 MG: 81 TABLET CHEWABLE at 03:03

## 2019-03-02 RX ADMIN — HEPARIN SODIUM 5000 UNITS: 5000 INJECTION, SOLUTION INTRAVENOUS; SUBCUTANEOUS at 03:03

## 2019-03-02 RX ADMIN — FUROSEMIDE 40 MG: 10 INJECTION, SOLUTION INTRAVENOUS at 05:03

## 2019-03-02 RX ADMIN — MICONAZOLE NITRATE: 20 OINTMENT TOPICAL at 09:03

## 2019-03-02 RX ADMIN — POTASSIUM PHOSPHATE, MONOBASIC AND POTASSIUM PHOSPHATE, DIBASIC 30 MMOL: 224; 236 INJECTION, SOLUTION, CONCENTRATE INTRAVENOUS at 09:03

## 2019-03-02 RX ADMIN — FUROSEMIDE 40 MG: 10 INJECTION, SOLUTION INTRAVENOUS at 01:03

## 2019-03-02 RX ADMIN — FLUTICASONE FUROATE AND VILANTEROL TRIFENATATE 1 PUFF: 100; 25 POWDER RESPIRATORY (INHALATION) at 09:03

## 2019-03-02 RX ADMIN — IPRATROPIUM BROMIDE AND ALBUTEROL SULFATE 3 ML: .5; 3 SOLUTION RESPIRATORY (INHALATION) at 09:03

## 2019-03-02 RX ADMIN — HYDROMORPHONE HYDROCHLORIDE 0.5 MG: 1 INJECTION, SOLUTION INTRAMUSCULAR; INTRAVENOUS; SUBCUTANEOUS at 12:03

## 2019-03-02 RX ADMIN — URSODIOL 500 MG: 250 TABLET, FILM COATED ORAL at 09:03

## 2019-03-02 RX ADMIN — Medication 400 MG: at 09:03

## 2019-03-02 RX ADMIN — IPRATROPIUM BROMIDE AND ALBUTEROL SULFATE 3 ML: .5; 3 SOLUTION RESPIRATORY (INHALATION) at 08:03

## 2019-03-02 RX ADMIN — DULOXETINE 60 MG: 60 CAPSULE, DELAYED RELEASE ORAL at 03:03

## 2019-03-02 RX ADMIN — Medication 400 MG: at 03:03

## 2019-03-02 RX ADMIN — THEOPHYLLINE ANHYDROUS 300 MG: 100 CAPSULE, EXTENDED RELEASE ORAL at 03:03

## 2019-03-02 RX ADMIN — MAGNESIUM SULFATE HEPTAHYDRATE 1 G: 500 INJECTION, SOLUTION INTRAMUSCULAR; INTRAVENOUS at 05:03

## 2019-03-02 RX ADMIN — PREDNISONE 10 MG: 5 TABLET ORAL at 03:03

## 2019-03-02 RX ADMIN — HEPARIN SODIUM 5000 UNITS: 5000 INJECTION, SOLUTION INTRAVENOUS; SUBCUTANEOUS at 09:03

## 2019-03-02 RX ADMIN — INSULIN ASPART 4 UNITS: 100 INJECTION, SOLUTION INTRAVENOUS; SUBCUTANEOUS at 05:03

## 2019-03-02 RX ADMIN — IPRATROPIUM BROMIDE AND ALBUTEROL SULFATE 3 ML: .5; 3 SOLUTION RESPIRATORY (INHALATION) at 01:03

## 2019-03-02 RX ADMIN — PANTOPRAZOLE SODIUM 40 MG: 40 GRANULE, DELAYED RELEASE ORAL at 03:03

## 2019-03-02 RX ADMIN — COLLAGENASE SANTYL: 250 OINTMENT TOPICAL at 03:03

## 2019-03-02 RX ADMIN — CLOPIDOGREL 75 MG: 75 TABLET, FILM COATED ORAL at 03:03

## 2019-03-02 RX ADMIN — URSODIOL 500 MG: 250 TABLET, FILM COATED ORAL at 03:03

## 2019-03-02 NOTE — PROGRESS NOTES
Gen Surg follow-up:     - Paged due to patient removing g tube this morning. Originally placed by IR on 2/11, replaced at bedside with 19 Fr alberto G, balloon inflated 5 cc, immediate confirmation on KUB with GG pushed at bedside, stitched into place x 2. Okay to use     -  Wound additionally changed, performed WTD at the opened portion of the midline stapled incision (fascia probed and remains intact), 4x4 dry gauze and medipore tape, RLQ wound tender to palpation, replaced to WTD would defer to wound care recs, last PAB 5 from 2/28, albumin 1.9 from 3/2, would continue on optimizing nutrition to assist with wound healing     Please page gen surg with further questions or concerns, thank you

## 2019-03-02 NOTE — PROGRESS NOTES
"[facility] Hospital Medicine  Progress Note    Patient Name: Sheryl Martines  MRN: [id number]  Patient Class: IP- Inpatient   Admission Date: 1/23/2019  Length of Stay: 36 days  Attending Physician: Aaron Lott MD  Primary Care Provider: [facility] Medicine Team: [facility] HOSP MED A Aaron Lott MD    Subjective:     Principal Problem:Acute on chronic diastolic (congestive) heart failure    HPI:  Sheryl Martines is a 64 yo female with medical history of asthma, COPD, CAD, h/o T2DM, hypertension, CVA 2012 with R sided deficits, HFpEF, severe debility, and lap converted to open appendectomy 8/2018 c/b C diff, FTT, and a round infection presents to the ED for acute on chronic increased generalized abdominal pain with associated NBNB emesis, nausea and decreased appetite.   Abd pain is normally 5/10 but today is 7/10.  Pain is described as a"ache" and is non-radiating, non-exertional and unrelated to PO intake. These symptoms are waxing and waning since 8/2018 and usually takes zofran for sxs.  Pt states she has had decreased appetite over the past few weeks, intermittently able to keep down solid food, but usually only able to eat things like chicken broth.    She came to ED today since she was weak described as "feeling bad".  Patient states last bowel movement was prior to admission, no diarrhea.  Patient's family unsure if patient has any recent fevers, but patient denies any f/c/night sweats.  Patient denies any vision changes, dizziness, LH, changes in urine output, dysuria, chest pain or shortness of breath.      In ED, HR elevated to 90-110s, BP stable, 100% on RA and afebrile. No leukocytosis but CRP 78 (ESR 17). T bili 1.7 (BL .7 to 1.1).  Alk phos 400s (BL).  Lipase normal, liver enzymes unremarkable. Ua shows 3 hyaline casts, 2+ protein, 1+ ketones, 1+ bilirubin.  Given reglan, zofran, protonix and duonebs in the ED for sxs.  CT A/P with contrast shows " ""Posterior right hemipelvis rim enhancing fluid collection, suspicious for abscess.  Possible associated retained appendicolith.  Adjacent urinary bladder wall thickening, suspicious for superimposed cystitis."  Gen surgery consulted and recs given. CT shows stability in size compared to CT on 9/30/18 and actually has decreased in size.  No emergent intervention recommended.     Hospital Course:  See problem list    Interval History:   Symptoms:   Same.  Diet:  Tolerating TF  Activity level:  Impaired due to weakness.    Pain:  No pain.       Review of Systems   Constitutional: Negative for fever.   Respiratory: Negative for shortness of breath.    Cardiovascular: Negative for chest pain.   Gastrointestinal: Negative for abdominal pain.     Objective:     Vital Signs (Most Recent):  Temp: 98.5 °F (36.9 °C) (03/01/19 1203)  Pulse: 96 (03/01/19 1516)  Resp: 20 (03/01/19 1455)  BP: (!) 106/53 (03/01/19 1203)  SpO2: 98 % (03/01/19 1455) Vital Signs (24h Range):  Temp:  [98.1 °F (36.7 °C)-98.9 °F (37.2 °C)] 98.5 °F (36.9 °C)  Pulse:  [] 96  Resp:  [15-25] 20  SpO2:  [77 %-100 %] 98 %  BP: (105-128)/(53-85) 106/53     Weight: 73 kg (161 lb)  Body mass index is 29.45 kg/m².    Intake/Output Summary (Last 24 hours) at 3/1/2019 1814  Last data filed at 3/1/2019 1700  Gross per 24 hour   Intake 1490 ml   Output 5780 ml   Net -4290 ml      Physical Exam   Constitutional: No distress.   Neck: No JVD present.   Cardiovascular: Normal rate, regular rhythm and normal heart sounds.   Pulmonary/Chest: Effort normal. No respiratory distress. She has no wheezes. She has no rales.   Abdominal: Soft. Bowel sounds are normal. She exhibits no distension.   Musculoskeletal: She exhibits edema (2+).   Neurological: She is alert.       MELD-Na score: 18 at 2/28/2019  5:58 AM  MELD score: 18 at 2/28/2019  5:58 AM  Calculated from:  Serum Creatinine: 0.7 mg/dL (Rounded to 1 mg/dL) at 2/28/2019  5:58 AM  Serum Sodium: 145 mmol/L (Rounded " to 137 mmol/L) at 2/28/2019  5:58 AM  Total Bilirubin: 12.1 mg/dL at 2/28/2019  5:58 AM  INR(ratio): 1.2 at 2/27/2019  3:24 AM  Age: 63 years    Significant Labs:  CBC:  Recent Labs   Lab 02/28/19  0558 03/01/19  0733   WBC 14.70* 14.88*   HGB 10.6* 9.9*   HCT 35.7* 33.7*    177     CMP:  Recent Labs   Lab 02/27/19  1828 02/28/19  0558 03/01/19  0733    145 142   K 4.7 4.3 4.5   CL 96 97 94*   CO2 41* 41* 41*   * 179* 154*   BUN 20 20 23   CREATININE 0.6 0.7 0.7   CALCIUM 9.0 8.9 8.9   PROT 5.4* 5.3* 5.8*   ALBUMIN 1.7* 1.7* 1.8*   BILITOT 12.9* 12.1* 9.8*   ALKPHOS 4,383* 4,004* 3,023*   * 422* 211*   * 104* 74*   ANIONGAP 6* 7* 7*   EGFRNONAA >60.0 >60.0 >60.0     PTINR:  No results for input(s): INR in the last 48 hours.        Assessment/Plan:      * Acute on chronic diastolic (congestive) heart failure    Patient's mental status improved in the ICU and is now on nasal cannula oxygen and BiPAP at night.  Medicine initially consulted 2/22 for assistance with management and noted patient to be markedly fluid overloaded, estimated to be approximately net positive 16 L with this admission.  Weight had increased from 71-75kg to 87kg.   She had elevated CVP and TTE was consistent volume overload as well as her BNP of > 4900.    IV diuresis with Lasix 40 bid  with improvement in urine output.    - 3/1 switched to home lasix dose when wt went down to 73kg.     Generalized abdominal pain    Due to recent Pelvic abscess in female  CT abdomen showed a 4 x 3 cm enhancing fluid collection consistent with abscess.  She also had elevated lactate of 6.8.  She was admitted to the STICU service for initial management. Patient taken to OR on 01/25 for ex lap with drainage of abscess.  Postoperative course complicated by septic shock secondary to MRSA bacteremia for which she received 2 weeks of IV vancomycin and briefly required pressors, which have now been weaned off.  Wound culture also grew  Bacteroides and patient completed 4 weeks of Cipro and Flagyl.  Patient remained intubated postoperatively and was successfully extubated on 01/28.  Initially, she was stabilized and transferred to the floor.     On the floor, patient was making some progress and being followed by [facility]; however, a rapid response was called on 02/19 due to somnolence.  ABG was consistent with acute hypercapnic respiratory failure, and she was placed on BiPAP and transferred back to SICU.  Infectious workup was significant for a UA with 39 WBCs, moderate bacteria, and moderate yeast; urine culture ultimately grew Candida for which she was started on Diflucan IV.    - 2/26 ID consult recommended no antibiotics .    - monitor       Debility    Mostly wheelchair bound, but able to work with [facility] using walker  - Long-term acute care facility -abdominal wound requiring dressing, needs intensive PT, on tube feeds requiring extensive SLP.       Dysphagia    She failed speech language therapy and was started on TPN with subsequent placement of IR guided G-tube in transition to tube feeds on 02/10.        Elevated liver enzymes    She was also found to have markedly elevated alk-phos, AST, and ALT.  Hepatology was consulted and felt acute liver injury related to infection versus medication, and not underlying liver disease. And ammonia was checked and was elevated, but hepatology recommended against treatment as they felt patient did not have hepatic encephalopathy.  Right upper quadrant ultrasound showed biliary sludge and gallbladder thickening but was negative for acute cholecystitis.  A subsequent HIDA scan was not consistent with acute cholecystitis; no surgical intervention needed per General surgery evaluation.    - re-consulted hepatology 2/27 who thought this could be drug induced.  DO not use cipro.  Recommend a trial of ursodiol 600mg BID to help with cholestasis with peak Bili of 12.  If not better in two weeks may  need biopsy.        Bradycardia    Around 2/22 pt was in junctional augusto. Cards saw pt and recommended that rate control meds (diltiazem) be held.  Augusto resolved.   - monitor rate             Acute respiratory failure with hypoxia and hypercarbia    Prone to hypercapnic narcosis  - wean o2  - bipap qhs and prn for AMS       Type 2 diabetes mellitus without complication, without long-term current use of insulin    -- acceptable; continue with current treatment & monitor        Moderate asthma without complication    Acute respiratory failure with hypoxia and hypercarbia  Pt prone to hypercapneic narcosis.  Steroid dependant asthma.  - c/w daily breo, theophylline, PRN duonebs  - qhs bipap and prn during the day     Hypomagnesemia    Replace prn       Severe malnutrition    - dietary consulted and appreciate recs; may require temporary TPN  - albumin 2.3 (chronic); will check prealbumin     Coronary artery disease involving native coronary artery of native heart without angina pectoris    - c/w ASA, plavix, statin  - no anginal equivalent       VTE Risk Mitigation (From admission, onward)        Ordered     heparin (porcine) injection 5,000 Units  Every 8 hours      02/18/19 0907     IP VTE HIGH RISK PATIENT  Once      01/25/19 1049     Place sequential compression device  Until discontinued      01/24/19 0027              Aaron Lott MD  Department of Hospital Medicine   [facility]

## 2019-03-02 NOTE — ASSESSMENT & PLAN NOTE
She failed speech language therapy and was started on TPN with subsequent placement of IR guided G-tube in transition to tube feeds on 02/10.   - surgery to try to place tube at bedside today

## 2019-03-02 NOTE — PROGRESS NOTES
40mg lasix IV ordered, /66, good urine output. KOTA Wilcox, notified. Will monitor BP and if needed will give 20mg later. Will continue to monitor.

## 2019-03-02 NOTE — PLAN OF CARE
"Problem: Adult Inpatient Plan of Care  Goal: Plan of Care Review  Outcome: Ongoing (interventions implemented as appropriate)  Patient remained in stable condition through shift. Remained free of falls and other injuries. Patient repositioned frequently when allowed staff. Patient screamed all night and didn't get any sleep. When asked what was wrong and why she was screaming, patient stated "why are you in here, I'm not screaming". Patient complained of pain, received PRN dilaudid. Ritchie intact draining clear dark yellow urine. Flexiseal in place. Glucerna infusing at 45ml/hr per order. Bed in locked and lowest position, call light in reach, all questions answered, declines any further needs at this time. Will continue to monitor.      "

## 2019-03-02 NOTE — SUBJECTIVE & OBJECTIVE
Interval History:   Symptoms:   Same.  Diet:  Tolerating TF  Activity level:  Impaired due to weakness.    Pain:  No pain.       Review of Systems   Constitutional: Negative for fever.   Respiratory: Negative for shortness of breath.    Cardiovascular: Negative for chest pain.   Gastrointestinal: Negative for abdominal pain.     Objective:     Vital Signs (Most Recent):  Temp: 98.5 °F (36.9 °C) (03/01/19 1203)  Pulse: 96 (03/01/19 1516)  Resp: 20 (03/01/19 1455)  BP: (!) 106/53 (03/01/19 1203)  SpO2: 98 % (03/01/19 1455) Vital Signs (24h Range):  Temp:  [98.1 °F (36.7 °C)-98.9 °F (37.2 °C)] 98.5 °F (36.9 °C)  Pulse:  [] 96  Resp:  [15-25] 20  SpO2:  [77 %-100 %] 98 %  BP: (105-128)/(53-85) 106/53     Weight: 73 kg (161 lb)  Body mass index is 29.45 kg/m².    Intake/Output Summary (Last 24 hours) at 3/1/2019 1814  Last data filed at 3/1/2019 1700  Gross per 24 hour   Intake 1490 ml   Output 5780 ml   Net -4290 ml      Physical Exam   Constitutional: No distress.   Neck: No JVD present.   Cardiovascular: Normal rate, regular rhythm and normal heart sounds.   Pulmonary/Chest: Effort normal. No respiratory distress. She has no wheezes. She has no rales.   Abdominal: Soft. Bowel sounds are normal. She exhibits no distension.   Musculoskeletal: She exhibits edema (2+).   Neurological: She is alert.       MELD-Na score: 18 at 2/28/2019  5:58 AM  MELD score: 18 at 2/28/2019  5:58 AM  Calculated from:  Serum Creatinine: 0.7 mg/dL (Rounded to 1 mg/dL) at 2/28/2019  5:58 AM  Serum Sodium: 145 mmol/L (Rounded to 137 mmol/L) at 2/28/2019  5:58 AM  Total Bilirubin: 12.1 mg/dL at 2/28/2019  5:58 AM  INR(ratio): 1.2 at 2/27/2019  3:24 AM  Age: 63 years    Significant Labs:  CBC:  Recent Labs   Lab 02/28/19  0558 03/01/19  0733   WBC 14.70* 14.88*   HGB 10.6* 9.9*   HCT 35.7* 33.7*    177     CMP:  Recent Labs   Lab 02/27/19  1828 02/28/19  0558 03/01/19  0733    145 142   K 4.7 4.3 4.5   CL 96 97 94*   CO2 41*  41* 41*   * 179* 154*   BUN 20 20 23   CREATININE 0.6 0.7 0.7   CALCIUM 9.0 8.9 8.9   PROT 5.4* 5.3* 5.8*   ALBUMIN 1.7* 1.7* 1.8*   BILITOT 12.9* 12.1* 9.8*   ALKPHOS 4,383* 4,004* 3,023*   * 422* 211*   * 104* 74*   ANIONGAP 6* 7* 7*   EGFRNONAA >60.0 >60.0 >60.0     PTINR:  No results for input(s): INR in the last 48 hours.

## 2019-03-02 NOTE — PROGRESS NOTES
KOTA Wilcox, notified of patient pulling out peg tube. Instructed to hold off on ng tube and will pass it along to day team. Will continue to monitor.

## 2019-03-02 NOTE — PLAN OF CARE
Problem: Diabetes Comorbidity  Goal: Blood Glucose Level Within Desired Range    Intervention: Maintain Glycemic Control  Patient alert and orientated , follows commands , bed in low position , call light within reach and patient demonstrated proper usage. Bed locked with brake , Room clutter free and personal items with reach, addressed care plan for today , all questions answered and allow patient time for clarification. Medications and diet fluid balance  instructions with signs and symptoms and the importance to continue once discharged .Reviewed discharged , next  Follow up appointment.

## 2019-03-02 NOTE — NURSING
Dr. Lott notified that pt removed PEG tube last night.  Orders to hold NG tube insertion until further notification.

## 2019-03-02 NOTE — PROGRESS NOTES
Patient pulled out her peg tube. No distress noted. [facility] A paged. Will continue to monitor.

## 2019-03-02 NOTE — ASSESSMENT & PLAN NOTE
She was also found to have markedly elevated alk-phos, AST, and ALT.  Hepatology was consulted and felt acute liver injury related to infection versus medication, and not underlying liver disease. And ammonia was checked and was elevated, but hepatology recommended against treatment as they felt patient did not have hepatic encephalopathy.  Right upper quadrant ultrasound showed biliary sludge and gallbladder thickening but was negative for acute cholecystitis.  A subsequent HIDA scan was not consistent with acute cholecystitis; no surgical intervention needed per General surgery evaluation.    - re-consulted hepatology 2/27 who thought this could be drug induced.  DO not use cipro.  Recommend a trial of ursodiol 600mg BID to help with cholestasis with peak Bili of 12.  If not better in two weeks may need biopsy.

## 2019-03-02 NOTE — ASSESSMENT & PLAN NOTE
Due to recent Pelvic abscess in female  CT abdomen showed a 4 x 3 cm enhancing fluid collection consistent with abscess.  She also had elevated lactate of 6.8.  She was admitted to the STICU service for initial management. Patient taken to OR on 01/25 for ex lap with drainage of abscess.  Postoperative course complicated by septic shock secondary to MRSA bacteremia for which she received 2 weeks of IV vancomycin and briefly required pressors, which have now been weaned off.  Wound culture also grew Bacteroides and patient completed 4 weeks of Cipro and Flagyl.  Patient remained intubated postoperatively and was successfully extubated on 01/28.  Initially, she was stabilized and transferred to the floor.     On the floor, patient was making some progress and being followed by [facility]; however, a rapid response was called on 02/19 due to somnolence.  ABG was consistent with acute hypercapnic respiratory failure, and she was placed on BiPAP and transferred back to SICU.  Infectious workup was significant for a UA with 39 WBCs, moderate bacteria, and moderate yeast; urine culture ultimately grew Candida for which she was started on Diflucan IV.    - 2/26 ID consult recommended no antibiotics . Will re-consult them on Monday 3/4 as her leukocytosis is not better.   - monitor  - Dr. Laurent's surgical team is following wounds. Spoke to them again today.

## 2019-03-02 NOTE — PROGRESS NOTES
HR sustaining 120s, complaining of pain, received PRN dilaudid. No distress noted. KOTA Wilcox, notified. Will continue to monitor.

## 2019-03-02 NOTE — SUBJECTIVE & OBJECTIVE
Interval History:   Symptoms:   Same.  Pt lost PEG overnight.  Diet:  Tolerating TF  Activity level:  Impaired due to weakness.    Pain:  No pain.       Review of Systems   Constitutional: Negative for fever.   Respiratory: Negative for shortness of breath.    Cardiovascular: Negative for chest pain.   Gastrointestinal: Negative for abdominal pain.     Objective:     Vital Signs (Most Recent):  Temp: 99 °F (37.2 °C) (03/02/19 1136)  Pulse: 105 (03/02/19 1315)  Resp: 20 (03/02/19 1315)  BP: 113/74 (03/02/19 1136)  SpO2: 99 % (03/02/19 1315) Vital Signs (24h Range):  Temp:  [98.6 °F (37 °C)-99.6 °F (37.6 °C)] 99 °F (37.2 °C)  Pulse:  [] 105  Resp:  [20] 20  SpO2:  [94 %-100 %] 99 %  BP: (103-113)/(60-74) 113/74     Weight: 73 kg (161 lb)  Body mass index is 29.45 kg/m².    Intake/Output Summary (Last 24 hours) at 3/2/2019 1328  Last data filed at 3/2/2019 0938  Gross per 24 hour   Intake 1150 ml   Output 3160 ml   Net -2010 ml      Physical Exam   Constitutional: No distress.   Neck: No JVD present.   Cardiovascular: Normal rate, regular rhythm and normal heart sounds.   Pulmonary/Chest: Effort normal. No respiratory distress. She has no wheezes. She has no rales.   Abdominal: Soft. Bowel sounds are normal. She exhibits no distension.   Musculoskeletal: She exhibits edema (2+).   Neurological: She is alert.       MELD-Na score: 18 at 2/28/2019  5:58 AM  MELD score: 18 at 2/28/2019  5:58 AM  Calculated from:  Serum Creatinine: 0.7 mg/dL (Rounded to 1 mg/dL) at 2/28/2019  5:58 AM  Serum Sodium: 145 mmol/L (Rounded to 137 mmol/L) at 2/28/2019  5:58 AM  Total Bilirubin: 12.1 mg/dL at 2/28/2019  5:58 AM  INR(ratio): 1.2 at 2/27/2019  3:24 AM  Age: 63 years    Significant Labs:  CBC:  Recent Labs   Lab 03/01/19  0733 03/02/19 0238   WBC 14.88* 15.20*   HGB 9.9* 10.4*   HCT 33.7* 35.9*    191     CMP:  Recent Labs   Lab 03/01/19 0733 03/02/19 0238    144   K 4.5 3.4*   CL 94* 93*   CO2 41* 40*   GLU  154* 105   BUN 23 20   CREATININE 0.7 0.7   CALCIUM 8.9 9.7   PROT 5.8* 6.5   ALBUMIN 1.8* 1.9*   BILITOT 9.8* 8.2*   ALKPHOS 3,023* 2,740*   * 168*   ALT 74* 65*   ANIONGAP 7* 11   EGFRNONAA >60.0 >60.0     PTINR:  No results for input(s): INR in the last 48 hours.

## 2019-03-02 NOTE — PROGRESS NOTES
"[facility] Hospital Medicine  Progress Note    Patient Name: Sheryl Martines  MRN: [id number]  Patient Class: IP- Inpatient   Admission Date: 1/23/2019  Length of Stay: 37 days  Attending Physician: Aaron Lott MD  Primary Care Provider: Primary Doctor [facility] Medicine Team: [facility] Aaron Lott MD    Subjective:     Principal Problem:Acute on chronic diastolic (congestive) heart failure    HPI:  Sheryl Martines is a 64 yo female with medical history of asthma, COPD, CAD, h/o T2DM, hypertension, CVA 2012 with R sided deficits, HFpEF, severe debility, and lap converted to open appendectomy 8/2018 c/b C diff, FTT, and a round infection presents to the ED for acute on chronic increased generalized abdominal pain with associated NBNB emesis, nausea and decreased appetite.   Abd pain is normally 5/10 but today is 7/10.  Pain is described as a"ache" and is non-radiating, non-exertional and unrelated to PO intake. These symptoms are waxing and waning since 8/2018 and usually takes zofran for sxs.  Pt states she has had decreased appetite over the past few weeks, intermittently able to keep down solid food, but usually only able to eat things like chicken broth.    She came to ED today since she was weak described as "feeling bad".  Patient states last bowel movement was prior to admission, no diarrhea.  Patient's family unsure if patient has any recent fevers, but patient denies any f/c/night sweats.  Patient denies any vision changes, dizziness, LH, changes in urine output, dysuria, chest pain or shortness of breath.      In ED, HR elevated to 90-110s, BP stable, 100% on RA and afebrile. No leukocytosis but CRP 78 (ESR 17). T bili 1.7 (BL .7 to 1.1).  Alk phos 400s (BL).  Lipase normal, liver enzymes unremarkable. Ua shows 3 hyaline casts, 2+ protein, 1+ ketones, 1+ bilirubin.  Given reglan, zofran, protonix and duonebs in the ED for sxs.  CT A/P with contrast shows " ""Posterior right hemipelvis rim enhancing fluid collection, suspicious for abscess.  Possible associated retained appendicolith.  Adjacent urinary bladder wall thickening, suspicious for superimposed cystitis."  Gen surgery consulted and recs given. CT shows stability in size compared to CT on 9/30/18 and actually has decreased in size.  No emergent intervention recommended.     Hospital Course:  See problem list    Interval History:   Symptoms:   Same.  Pt lost PEG overnight.  Diet:  Tolerating TF  Activity level:  Impaired due to weakness.    Pain:  No pain.       Review of Systems   Constitutional: Negative for fever.   Respiratory: Negative for shortness of breath.    Cardiovascular: Negative for chest pain.   Gastrointestinal: Negative for abdominal pain.     Objective:     Vital Signs (Most Recent):  Temp: 99 °F (37.2 °C) (03/02/19 1136)  Pulse: 105 (03/02/19 1315)  Resp: 20 (03/02/19 1315)  BP: 113/74 (03/02/19 1136)  SpO2: 99 % (03/02/19 1315) Vital Signs (24h Range):  Temp:  [98.6 °F (37 °C)-99.6 °F (37.6 °C)] 99 °F (37.2 °C)  Pulse:  [] 105  Resp:  [20] 20  SpO2:  [94 %-100 %] 99 %  BP: (103-113)/(60-74) 113/74     Weight: 73 kg (161 lb)  Body mass index is 29.45 kg/m².    Intake/Output Summary (Last 24 hours) at 3/2/2019 1328  Last data filed at 3/2/2019 0938  Gross per 24 hour   Intake 1150 ml   Output 3160 ml   Net -2010 ml      Physical Exam   Constitutional: No distress.   Neck: No JVD present.   Cardiovascular: Normal rate, regular rhythm and normal heart sounds.   Pulmonary/Chest: Effort normal. No respiratory distress. She has no wheezes. She has no rales.   Abdominal: Soft. Bowel sounds are normal. She exhibits no distension.   Musculoskeletal: She exhibits edema (2+).   Neurological: She is alert.       MELD-Na score: 18 at 2/28/2019  5:58 AM  MELD score: 18 at 2/28/2019  5:58 AM  Calculated from:  Serum Creatinine: 0.7 mg/dL (Rounded to 1 mg/dL) at 2/28/2019  5:58 AM  Serum Sodium: 145 " mmol/L (Rounded to 137 mmol/L) at 2/28/2019  5:58 AM  Total Bilirubin: 12.1 mg/dL at 2/28/2019  5:58 AM  INR(ratio): 1.2 at 2/27/2019  3:24 AM  Age: 63 years    Significant Labs:  CBC:  Recent Labs   Lab 03/01/19  0733 03/02/19  0238   WBC 14.88* 15.20*   HGB 9.9* 10.4*   HCT 33.7* 35.9*    191     CMP:  Recent Labs   Lab 03/01/19  0733 03/02/19  0238    144   K 4.5 3.4*   CL 94* 93*   CO2 41* 40*   * 105   BUN 23 20   CREATININE 0.7 0.7   CALCIUM 8.9 9.7   PROT 5.8* 6.5   ALBUMIN 1.8* 1.9*   BILITOT 9.8* 8.2*   ALKPHOS 3,023* 2,740*   * 168*   ALT 74* 65*   ANIONGAP 7* 11   EGFRNONAA >60.0 >60.0     PTINR:  No results for input(s): INR in the last 48 hours.        Assessment/Plan:      * Acute on chronic diastolic (congestive) heart failure    Patient's mental status improved in the ICU and is now on nasal cannula oxygen and BiPAP at night.  Medicine initially consulted 2/22 for assistance with management and noted patient to be markedly fluid overloaded, estimated to be approximately net positive 16 L with this admission.  Weight had increased from 71-75kg to 87kg.   She had elevated CVP and TTE was consistent volume overload as well as her BNP of > 4900.    IV diuresis with Lasix 40 bid  with improvement in urine output.    - 3/2 weight down to 73kg but pt still overloaded on exam with BNP of 2600; c/w IV lasix      Generalized abdominal pain    Due to recent Pelvic abscess in female  CT abdomen showed a 4 x 3 cm enhancing fluid collection consistent with abscess.  She also had elevated lactate of 6.8.  She was admitted to the STICU service for initial management. Patient taken to OR on 01/25 for ex lap with drainage of abscess.  Postoperative course complicated by septic shock secondary to MRSA bacteremia for which she received 2 weeks of IV vancomycin and briefly required pressors, which have now been weaned off.  Wound culture also grew Bacteroides and patient completed 4 weeks of  Cipro and Flagyl.  Patient remained intubated postoperatively and was successfully extubated on 01/28.  Initially, she was stabilized and transferred to the floor.     On the floor, patient was making some progress and being followed by [facility]; however, a rapid response was called on 02/19 due to somnolence.  ABG was consistent with acute hypercapnic respiratory failure, and she was placed on BiPAP and transferred back to SICU.  Infectious workup was significant for a UA with 39 WBCs, moderate bacteria, and moderate yeast; urine culture ultimately grew Candida for which she was started on Diflucan IV.    - 2/26 ID consult recommended no antibiotics . Will re-consult them on Monday 3/4 as her leukocytosis is not better.   - monitor  - Dr. Laurent's surgical team is following wounds. Spoke to them again today.       Debility    Mostly wheelchair bound, but able to work with [facility] using walker  - Long-term acute care facility -abdominal wound requiring dressing, needs intensive PT, on tube feeds requiring extensive SLP.       Dysphagia    She failed speech language therapy and was started on TPN with subsequent placement of IR guided G-tube in transition to tube feeds on 02/10.   - surgery to try to place tube at bedside today       Elevated liver enzymes    She was also found to have markedly elevated alk-phos, AST, and ALT.  Hepatology was consulted and felt acute liver injury related to infection versus medication, and not underlying liver disease. And ammonia was checked and was elevated, but hepatology recommended against treatment as they felt patient did not have hepatic encephalopathy.  Right upper quadrant ultrasound showed biliary sludge and gallbladder thickening but was negative for acute cholecystitis.  A subsequent HIDA scan was not consistent with acute cholecystitis; no surgical intervention needed per General surgery evaluation.    - re-consulted hepatology 2/27 who thought this could be  drug induced.  DO not use cipro.  Recommend a trial of ursodiol 600mg BID to help with cholestasis with peak Bili of 12.  If not better in two weeks may need biopsy.        Bradycardia    Around 2/22 pt was in junctional augusto. Cards saw pt and recommended that rate control meds (diltiazem) be held.  Augusto resolved.   - monitor rate             Acute respiratory failure with hypoxia and hypercarbia    Prone to hypercapnic narcosis  - wean o2  - bipap qhs and prn for AMS       Type 2 diabetes mellitus without complication, without long-term current use of insulin    -- acceptable; continue with current treatment & monitor        Moderate asthma without complication    Acute respiratory failure with hypoxia and hypercarbia  Pt prone to hypercapneic narcosis.  Steroid dependant asthma.  - c/w daily breo, theophylline, PRN duonebs  - qhs bipap and prn during the day     Hypomagnesemia    Replace prn       Severe malnutrition    - dietary consulted and appreciate recs; may require temporary TPN  - albumin 2.3 (chronic); will check prealbumin     Coronary artery disease involving native coronary artery of native heart without angina pectoris    - c/w ASA, plavix, statin  - no anginal equivalent       VTE Risk Mitigation (From admission, onward)        Ordered     heparin (porcine) injection 5,000 Units  Every 8 hours      02/18/19 0907     IP VTE HIGH RISK PATIENT  Once      01/25/19 1049     Place sequential compression device  Until discontinued      01/24/19 0027              Aaron Lott MD  Department of Hospital Medicine   [facility]

## 2019-03-03 LAB
ALBUMIN SERPL BCP-MCNC: 1.9 G/DL
ALP SERPL-CCNC: 2006 U/L
ALT SERPL W/O P-5'-P-CCNC: 43 U/L
ANION GAP SERPL CALC-SCNC: 9 MMOL/L
AST SERPL-CCNC: 90 U/L
BACTERIA UR CULT: NO GROWTH
BASOPHILS # BLD AUTO: 0.03 K/UL
BASOPHILS NFR BLD: 0.2 %
BILIRUB SERPL-MCNC: 6 MG/DL
BUN SERPL-MCNC: 19 MG/DL
CALCIUM SERPL-MCNC: 9.2 MG/DL
CHLORIDE SERPL-SCNC: 94 MMOL/L
CO2 SERPL-SCNC: 38 MMOL/L
CREAT SERPL-MCNC: 0.7 MG/DL
DIFFERENTIAL METHOD: ABNORMAL
EOSINOPHIL # BLD AUTO: 0.1 K/UL
EOSINOPHIL NFR BLD: 0.4 %
ERYTHROCYTE [DISTWIDTH] IN BLOOD BY AUTOMATED COUNT: 21.4 %
EST. GFR  (AFRICAN AMERICAN): >60 ML/MIN/1.73 M^2
EST. GFR  (NON AFRICAN AMERICAN): >60 ML/MIN/1.73 M^2
GLUCOSE SERPL-MCNC: 167 MG/DL
HCT VFR BLD AUTO: 33.2 %
HGB BLD-MCNC: 9.9 G/DL
IMM GRANULOCYTES # BLD AUTO: 0.1 K/UL
IMM GRANULOCYTES NFR BLD AUTO: 0.6 %
LYMPHOCYTES # BLD AUTO: 2.8 K/UL
LYMPHOCYTES NFR BLD: 17.2 %
MAGNESIUM SERPL-MCNC: 1.7 MG/DL
MCH RBC QN AUTO: 31.6 PG
MCHC RBC AUTO-ENTMCNC: 29.8 G/DL
MCV RBC AUTO: 106 FL
MONOCYTES # BLD AUTO: 1.7 K/UL
MONOCYTES NFR BLD: 10.1 %
NEUTROPHILS # BLD AUTO: 11.8 K/UL
NEUTROPHILS NFR BLD: 71.5 %
NRBC BLD-RTO: 2 /100 WBC
PHOSPHATE SERPL-MCNC: 3.4 MG/DL
PLATELET # BLD AUTO: 178 K/UL
PMV BLD AUTO: 11.8 FL
POCT GLUCOSE: 192 MG/DL (ref 70–110)
POCT GLUCOSE: 194 MG/DL (ref 70–110)
POCT GLUCOSE: 196 MG/DL (ref 70–110)
POCT GLUCOSE: 212 MG/DL (ref 70–110)
POTASSIUM SERPL-SCNC: 3.7 MMOL/L
PROT SERPL-MCNC: 5.8 G/DL
RBC # BLD AUTO: 3.13 M/UL
SODIUM SERPL-SCNC: 141 MMOL/L
WBC # BLD AUTO: 16.45 K/UL

## 2019-03-03 PROCEDURE — 36415 COLL VENOUS BLD VENIPUNCTURE: CPT

## 2019-03-03 PROCEDURE — 25000003 PHARM REV CODE 250: Performed by: HOSPITALIST

## 2019-03-03 PROCEDURE — 83735 ASSAY OF MAGNESIUM: CPT

## 2019-03-03 PROCEDURE — 94664 DEMO&/EVAL PT USE INHALER: CPT

## 2019-03-03 PROCEDURE — 11000001 HC ACUTE MED/SURG PRIVATE ROOM

## 2019-03-03 PROCEDURE — 25000003 PHARM REV CODE 250: Performed by: STUDENT IN AN ORGANIZED HEALTH CARE EDUCATION/TRAINING PROGRAM

## 2019-03-03 PROCEDURE — 99900035 HC TECH TIME PER 15 MIN (STAT)

## 2019-03-03 PROCEDURE — 85025 COMPLETE CBC W/AUTO DIFF WBC: CPT

## 2019-03-03 PROCEDURE — 99499 UNLISTED E&M SERVICE: CPT | Mod: ,,, | Performed by: NURSE PRACTITIONER

## 2019-03-03 PROCEDURE — 84100 ASSAY OF PHOSPHORUS: CPT

## 2019-03-03 PROCEDURE — 63600175 PHARM REV CODE 636 W HCPCS: Performed by: STUDENT IN AN ORGANIZED HEALTH CARE EDUCATION/TRAINING PROGRAM

## 2019-03-03 PROCEDURE — 99232 SBSQ HOSP IP/OBS MODERATE 35: CPT | Mod: GC,,, | Performed by: HOSPITALIST

## 2019-03-03 PROCEDURE — 63600175 PHARM REV CODE 636 W HCPCS: Performed by: HOSPITALIST

## 2019-03-03 PROCEDURE — 94640 AIRWAY INHALATION TREATMENT: CPT

## 2019-03-03 PROCEDURE — 80053 COMPREHEN METABOLIC PANEL: CPT

## 2019-03-03 PROCEDURE — 99499 NO LOS: ICD-10-PCS | Mod: ,,, | Performed by: NURSE PRACTITIONER

## 2019-03-03 PROCEDURE — 94761 N-INVAS EAR/PLS OXIMETRY MLT: CPT

## 2019-03-03 PROCEDURE — 25000242 PHARM REV CODE 250 ALT 637 W/ HCPCS: Performed by: HOSPITALIST

## 2019-03-03 PROCEDURE — 27000221 HC OXYGEN, UP TO 24 HOURS

## 2019-03-03 PROCEDURE — 99232 PR SUBSEQUENT HOSPITAL CARE,LEVL II: ICD-10-PCS | Mod: GC,,, | Performed by: HOSPITALIST

## 2019-03-03 RX ORDER — SODIUM BICARBONATE 650 MG/1
650 TABLET ORAL ONCE
Status: DISCONTINUED | OUTPATIENT
Start: 2019-03-03 | End: 2019-03-03

## 2019-03-03 RX ORDER — POTASSIUM CHLORIDE 20 MEQ/15ML
40 SOLUTION ORAL ONCE
Status: COMPLETED | OUTPATIENT
Start: 2019-03-03 | End: 2019-03-03

## 2019-03-03 RX ADMIN — MICONAZOLE NITRATE: 20 OINTMENT TOPICAL at 08:03

## 2019-03-03 RX ADMIN — INSULIN ASPART 2 UNITS: 100 INJECTION, SOLUTION INTRAVENOUS; SUBCUTANEOUS at 05:03

## 2019-03-03 RX ADMIN — COLLAGENASE SANTYL: 250 OINTMENT TOPICAL at 08:03

## 2019-03-03 RX ADMIN — IPRATROPIUM BROMIDE AND ALBUTEROL SULFATE 3 ML: .5; 3 SOLUTION RESPIRATORY (INHALATION) at 07:03

## 2019-03-03 RX ADMIN — IPRATROPIUM BROMIDE AND ALBUTEROL SULFATE 3 ML: .5; 3 SOLUTION RESPIRATORY (INHALATION) at 01:03

## 2019-03-03 RX ADMIN — HEPARIN SODIUM 5000 UNITS: 5000 INJECTION, SOLUTION INTRAVENOUS; SUBCUTANEOUS at 09:03

## 2019-03-03 RX ADMIN — MICONAZOLE NITRATE: 20 OINTMENT TOPICAL at 09:03

## 2019-03-03 RX ADMIN — THEOPHYLLINE ANHYDROUS 300 MG: 100 CAPSULE, EXTENDED RELEASE ORAL at 08:03

## 2019-03-03 RX ADMIN — HYDROMORPHONE HYDROCHLORIDE 0.5 MG: 1 INJECTION, SOLUTION INTRAMUSCULAR; INTRAVENOUS; SUBCUTANEOUS at 05:03

## 2019-03-03 RX ADMIN — URSODIOL 500 MG: 250 TABLET, FILM COATED ORAL at 09:03

## 2019-03-03 RX ADMIN — HEPARIN SODIUM 5000 UNITS: 5000 INJECTION, SOLUTION INTRAVENOUS; SUBCUTANEOUS at 01:03

## 2019-03-03 RX ADMIN — PREDNISONE 10 MG: 5 TABLET ORAL at 08:03

## 2019-03-03 RX ADMIN — DIPHENHYDRAMINE HYDROCHLORIDE 12.5 MG: 50 INJECTION, SOLUTION INTRAMUSCULAR; INTRAVENOUS at 02:03

## 2019-03-03 RX ADMIN — HYDROMORPHONE HYDROCHLORIDE 0.5 MG: 1 INJECTION, SOLUTION INTRAMUSCULAR; INTRAVENOUS; SUBCUTANEOUS at 08:03

## 2019-03-03 RX ADMIN — ASPIRIN 81 MG CHEWABLE TABLET 81 MG: 81 TABLET CHEWABLE at 08:03

## 2019-03-03 RX ADMIN — PANTOPRAZOLE SODIUM 40 MG: 40 GRANULE, DELAYED RELEASE ORAL at 08:03

## 2019-03-03 RX ADMIN — INSULIN ASPART 4 UNITS: 100 INJECTION, SOLUTION INTRAVENOUS; SUBCUTANEOUS at 11:03

## 2019-03-03 RX ADMIN — HEPARIN SODIUM 5000 UNITS: 5000 INJECTION, SOLUTION INTRAVENOUS; SUBCUTANEOUS at 05:03

## 2019-03-03 RX ADMIN — CLOPIDOGREL 75 MG: 75 TABLET, FILM COATED ORAL at 08:03

## 2019-03-03 RX ADMIN — FUROSEMIDE 40 MG: 10 INJECTION, SOLUTION INTRAVENOUS at 05:03

## 2019-03-03 RX ADMIN — FUROSEMIDE 40 MG: 10 INJECTION, SOLUTION INTRAVENOUS at 08:03

## 2019-03-03 RX ADMIN — IPRATROPIUM BROMIDE AND ALBUTEROL SULFATE 3 ML: .5; 3 SOLUTION RESPIRATORY (INHALATION) at 08:03

## 2019-03-03 RX ADMIN — FLUTICASONE FUROATE AND VILANTEROL TRIFENATATE 1 PUFF: 100; 25 POWDER RESPIRATORY (INHALATION) at 08:03

## 2019-03-03 RX ADMIN — POTASSIUM CHLORIDE 40 MEQ: 20 SOLUTION ORAL at 09:03

## 2019-03-03 RX ADMIN — DIPHENHYDRAMINE HYDROCHLORIDE 12.5 MG: 50 INJECTION, SOLUTION INTRAMUSCULAR; INTRAVENOUS at 09:03

## 2019-03-03 RX ADMIN — HYDROMORPHONE HYDROCHLORIDE 0.5 MG: 1 INJECTION, SOLUTION INTRAMUSCULAR; INTRAVENOUS; SUBCUTANEOUS at 02:03

## 2019-03-03 RX ADMIN — DULOXETINE 60 MG: 60 CAPSULE, DELAYED RELEASE ORAL at 08:03

## 2019-03-03 RX ADMIN — DIPHENHYDRAMINE HYDROCHLORIDE 12.5 MG: 50 INJECTION, SOLUTION INTRAMUSCULAR; INTRAVENOUS at 08:03

## 2019-03-03 RX ADMIN — URSODIOL 500 MG: 250 TABLET, FILM COATED ORAL at 08:03

## 2019-03-03 NOTE — ASSESSMENT & PLAN NOTE
She was also found to have markedly elevated alk-phos, AST, and ALT.  Hepatology was consulted and felt acute liver injury related to infection versus medication, and not underlying liver disease. And ammonia was checked and was elevated, but hepatology recommended against treatment as they felt patient did not have hepatic encephalopathy.  Right upper quadrant ultrasound showed biliary sludge and gallbladder thickening but was negative for acute cholecystitis.  A subsequent HIDA scan was not consistent with acute cholecystitis; no surgical intervention needed per General surgery evaluation.    - re-consulted hepatology 2/27 who thought this could be drug induced.  DO not use cipro.  Recommend a trial of ursodiol 600mg BID to help with cholestasis with peak Bili of 12.  If not better in two weeks may need biopsy.   - diarrhea may be bile induced, monitor for improvement and consider cholestyramine if not ebtter

## 2019-03-03 NOTE — ASSESSMENT & PLAN NOTE
Patient's mental status improved in the ICU and is now on nasal cannula oxygen and BiPAP at night.  Medicine initially consulted 2/22 for assistance with management and noted patient to be markedly fluid overloaded, estimated to be approximately net positive 16 L with this admission.  Weight had increased from 71-75kg to 87kg.   She had elevated CVP and TTE was consistent volume overload as well as her BNP of > 4900.    IV diuresis with Lasix 40 bid  with improvement in urine output.    - 3/2 weight down to 73kg but pt still overloaded on exam with BNP of 2600; c/w IV lasix

## 2019-03-03 NOTE — ASSESSMENT & PLAN NOTE
She failed speech language therapy and was started on TPN with subsequent placement of IR guided G-tube in transition to tube feeds on 02/10. Surgery replaced tube 3/2 when pt pulled it out.  - c/w tube feeds

## 2019-03-03 NOTE — SUBJECTIVE & OBJECTIVE
Interval History:   Symptoms:   Same.    Diet:  Tolerating TF  Activity level:  Impaired due to weakness.    Pain:  No pain.       Review of Systems   Constitutional: Negative for fever.   Respiratory: Negative for shortness of breath.    Cardiovascular: Negative for chest pain.   Gastrointestinal: Positive for diarrhea (green and watery). Negative for abdominal pain.     Objective:     Vital Signs (Most Recent):  Temp: 98.3 °F (36.8 °C) (03/03/19 1158)  Pulse: 97 (03/03/19 1202)  Resp: 18 (03/03/19 1158)  BP: (!) 151/78 (03/03/19 1158)  SpO2: 97 % (03/03/19 1158) Vital Signs (24h Range):  Temp:  [98 °F (36.7 °C)-99.1 °F (37.3 °C)] 98.3 °F (36.8 °C)  Pulse:  [] 97  Resp:  [14-22] 18  SpO2:  [85 %-100 %] 97 %  BP: (112-151)/(57-80) 151/78     Weight: 66.7 kg (147 lb)  Body mass index is 26.89 kg/m².    Intake/Output Summary (Last 24 hours) at 3/3/2019 1235  Last data filed at 3/3/2019 0850  Gross per 24 hour   Intake 250 ml   Output 1950 ml   Net -1700 ml      Physical Exam   Constitutional: No distress.   Neck: No JVD present.   Cardiovascular: Normal rate, regular rhythm and normal heart sounds.   Pulmonary/Chest: Effort normal. No respiratory distress. She has no wheezes. She has no rales.   Abdominal: Soft. Bowel sounds are normal. She exhibits no distension.   Musculoskeletal: She exhibits edema (2+).   Neurological: She is alert.       MELD-Na score: 18 at 2/28/2019  5:58 AM  MELD score: 18 at 2/28/2019  5:58 AM  Calculated from:  Serum Creatinine: 0.7 mg/dL (Rounded to 1 mg/dL) at 2/28/2019  5:58 AM  Serum Sodium: 145 mmol/L (Rounded to 137 mmol/L) at 2/28/2019  5:58 AM  Total Bilirubin: 12.1 mg/dL at 2/28/2019  5:58 AM  INR(ratio): 1.2 at 2/27/2019  3:24 AM  Age: 63 years    Significant Labs:  CBC:  Recent Labs   Lab 03/02/19 0238 03/03/19 0722   WBC 15.20* 16.45*   HGB 10.4* 9.9*   HCT 35.9* 33.2*    178     CMP:  Recent Labs   Lab 03/02/19 0238 03/03/19 0722    141   K 3.4* 3.7   CL  93* 94*   CO2 40* 38*    167*   BUN 20 19   CREATININE 0.7 0.7   CALCIUM 9.7 9.2   PROT 6.5 5.8*   ALBUMIN 1.9* 1.9*   BILITOT 8.2* 6.0*   ALKPHOS 2,740* 2,006*   * 90*   ALT 65* 43   ANIONGAP 11 9   EGFRNONAA >60.0 >60.0     PTINR:  No results for input(s): INR in the last 48 hours.

## 2019-03-03 NOTE — CONSULTS
[facility]  Infectious Disease  Consult Note    Patient Name: Sheryl Martines  MRN: [id number]  Admission Date: 1/23/2019  Hospital Length of Stay: 38 days  Attending Physician: Aaron Lott MD  Primary Care Provider: Primary Doctor No     Isolation Status: No active isolations      Inpatient consult to Infectious Diseases  Consult performed by: CORNELIO Rudolph, ANP  Consult ordered by: Aaron Lott MD  Reason for consult: worsening leukocytosis; ok UA, cxr, and surgery says wounds ok.  OK to see pt Monday am; no hurry.      ID Consult acknowledged.   Per consult request, ok to see Monday.  ID team will see first thing Monday.    In the interim, please call for any immediate concerns.     Thank you.   Please call for any questions or concerns.  CORNELIO Lockwood, ANP-C  [phone] pager,  [phone]

## 2019-03-03 NOTE — PLAN OF CARE
Problem: Diabetes Comorbidity  Goal: Blood Glucose Level Within Desired Range  Outcome: Ongoing (interventions implemented as appropriate)  Pt received long acting insulin and prn insulin coverage if needed. Provided pt education on signs and symptoms of hypo/hyperglycemia and when to report to the nurse. Carbohydrate replacement snack left at the bedside in the event of hypoglycemia. No significant events over night, BS remained stable. WCTM

## 2019-03-03 NOTE — RESIDENT HANDOFF
I was notified by RN that patient's PEG tube was not flushing. KUB s/p placement yesterday showed that PEG tube was in stomach. I went to bedside and was easily able to manually declog tube with 60 cc cath tip syringe and normal saline. As of 11:30 am, PEG tube is flushing fine with easy aspiration of gastric contents. Please call surgery with any questions or concerns.    TEJAS Bush MD  General Surgery, PGY-1  Pager: [phone]

## 2019-03-03 NOTE — ASSESSMENT & PLAN NOTE
Due to recent Pelvic abscess in female  CT abdomen showed a 4 x 3 cm enhancing fluid collection consistent with abscess.  She also had elevated lactate of 6.8.  She was admitted to the STICU service (Dr. Laurent) for initial management. Patient taken to OR on 01/25 for ex lap with drainage of abscess.  Postoperative course complicated by septic shock secondary to MRSA bacteremia for which she received 2 weeks of IV vancomycin and briefly required pressors, which have now been weaned off.  Wound culture also grew Bacteroides and patient completed 4 weeks of Cipro and Flagyl.  Patient remained intubated postoperatively and was successfully extubated on 01/28.  Initially, she was stabilized and transferred to the floor.     On the floor, patient was making some progress and being followed by [facility]; however, a rapid response was called on 02/19 due to somnolence.  ABG was consistent with acute hypercapnic respiratory failure, and she was placed on BiPAP and transferred back to SICU.  Infectious workup was significant for a UA with 39 WBCs, moderate bacteria, and moderate yeast; urine culture ultimately grew Candida for which she was started on Diflucan IV.    3/3 surgery looked at wounds and deferred to wound care  - 2/26 ID consult recommended no antibiotics . Will re-consult them on Monday 3/4 as her leukocytosis is not better.   - monitor

## 2019-03-03 NOTE — PROGRESS NOTES
"[facility] Hospital Medicine  Progress Note    Patient Name: Sheryl Martines  MRN: [id number]  Patient Class: IP- Inpatient   Admission Date: 1/23/2019  Length of Stay: 38 days  Attending Physician: Aaron Lott MD  Primary Care Provider: [facility] Doctor [facility] Medicine Team: [facility] A Aaron Lott MD    Subjective:     Principal Problem:Acute on chronic diastolic (congestive) heart failure    HPI:  Sheryl Martines is a 62 yo female with medical history of asthma, COPD, CAD, h/o T2DM, hypertension, CVA 2012 with R sided deficits, HFpEF, severe debility, and lap converted to open appendectomy 8/2018 c/b C diff, FTT, and a round infection presents to the ED for acute on chronic increased generalized abdominal pain with associated NBNB emesis, nausea and decreased appetite.   Abd pain is normally 5/10 but today is 7/10.  Pain is described as a"ache" and is non-radiating, non-exertional and unrelated to PO intake. These symptoms are waxing and waning since 8/2018 and usually takes zofran for sxs.  Pt states she has had decreased appetite over the past few weeks, intermittently able to keep down solid food, but usually only able to eat things like chicken broth.    She came to ED today since she was weak described as "feeling bad".  Patient states last bowel movement was prior to admission, no diarrhea.  Patient's family unsure if patient has any recent fevers, but patient denies any f/c/night sweats.  Patient denies any vision changes, dizziness, LH, changes in urine output, dysuria, chest pain or shortness of breath.      In ED, HR elevated to 90-110s, BP stable, 100% on RA and afebrile. No leukocytosis but CRP 78 (ESR 17). T bili 1.7 (BL .7 to 1.1).  Alk phos 400s (BL).  Lipase normal, liver enzymes unremarkable. Ua shows 3 hyaline casts, 2+ protein, 1+ ketones, 1+ bilirubin.  Given reglan, zofran, protonix and duonebs in the ED for sxs.  CT A/P with contrast shows " ""Posterior right hemipelvis rim enhancing fluid collection, suspicious for abscess.  Possible associated retained appendicolith.  Adjacent urinary bladder wall thickening, suspicious for superimposed cystitis."  Gen surgery consulted and recs given. CT shows stability in size compared to CT on 9/30/18 and actually has decreased in size.  No emergent intervention recommended.     Hospital Course:  See problem list    Interval History:   Symptoms:   Same.    Diet:  Tolerating TF  Activity level:  Impaired due to weakness.    Pain:  No pain.       Review of Systems   Constitutional: Negative for fever.   Respiratory: Negative for shortness of breath.    Cardiovascular: Negative for chest pain.   Gastrointestinal: Positive for diarrhea (green and watery). Negative for abdominal pain.     Objective:     Vital Signs (Most Recent):  Temp: 98.3 °F (36.8 °C) (03/03/19 1158)  Pulse: 97 (03/03/19 1202)  Resp: 18 (03/03/19 1158)  BP: (!) 151/78 (03/03/19 1158)  SpO2: 97 % (03/03/19 1158) Vital Signs (24h Range):  Temp:  [98 °F (36.7 °C)-99.1 °F (37.3 °C)] 98.3 °F (36.8 °C)  Pulse:  [] 97  Resp:  [14-22] 18  SpO2:  [85 %-100 %] 97 %  BP: (112-151)/(57-80) 151/78     Weight: 66.7 kg (147 lb)  Body mass index is 26.89 kg/m².    Intake/Output Summary (Last 24 hours) at 3/3/2019 1235  Last data filed at 3/3/2019 0850  Gross per 24 hour   Intake 250 ml   Output 1950 ml   Net -1700 ml      Physical Exam   Constitutional: No distress.   Neck: No JVD present.   Cardiovascular: Normal rate, regular rhythm and normal heart sounds.   Pulmonary/Chest: Effort normal. No respiratory distress. She has no wheezes. She has no rales.   Abdominal: Soft. Bowel sounds are normal. She exhibits no distension.   Musculoskeletal: She exhibits edema (2+).   Neurological: She is alert.       MELD-Na score: 18 at 2/28/2019  5:58 AM  MELD score: 18 at 2/28/2019  5:58 AM  Calculated from:  Serum Creatinine: 0.7 mg/dL (Rounded to 1 mg/dL) at 2/28/2019  " 5:58 AM  Serum Sodium: 145 mmol/L (Rounded to 137 mmol/L) at 2/28/2019  5:58 AM  Total Bilirubin: 12.1 mg/dL at 2/28/2019  5:58 AM  INR(ratio): 1.2 at 2/27/2019  3:24 AM  Age: 63 years    Significant Labs:  CBC:  Recent Labs   Lab 03/02/19  0238 03/03/19  0722   WBC 15.20* 16.45*   HGB 10.4* 9.9*   HCT 35.9* 33.2*    178     CMP:  Recent Labs   Lab 03/02/19  0238 03/03/19  0722    141   K 3.4* 3.7   CL 93* 94*   CO2 40* 38*    167*   BUN 20 19   CREATININE 0.7 0.7   CALCIUM 9.7 9.2   PROT 6.5 5.8*   ALBUMIN 1.9* 1.9*   BILITOT 8.2* 6.0*   ALKPHOS 2,740* 2,006*   * 90*   ALT 65* 43   ANIONGAP 11 9   EGFRNONAA >60.0 >60.0     PTINR:  No results for input(s): INR in the last 48 hours.        Assessment/Plan:      * Acute on chronic diastolic (congestive) heart failure    Patient's mental status improved in the ICU and is now on nasal cannula oxygen and BiPAP at night.  Medicine initially consulted 2/22 for assistance with management and noted patient to be markedly fluid overloaded, estimated to be approximately net positive 16 L with this admission.  Weight had increased from 71-75kg to 87kg.   She had elevated CVP and TTE was consistent volume overload as well as her BNP of > 4900.    IV diuresis with Lasix 40 bid  with improvement in urine output.    - 3/2 weight down to 73kg but pt still overloaded on exam with BNP of 2600; c/w IV lasix      Generalized abdominal pain    Due to recent Pelvic abscess in female  CT abdomen showed a 4 x 3 cm enhancing fluid collection consistent with abscess.  She also had elevated lactate of 6.8.  She was admitted to the STICU service (Dr. Laurent) for initial management. Patient taken to OR on 01/25 for ex lap with drainage of abscess.  Postoperative course complicated by septic shock secondary to MRSA bacteremia for which she received 2 weeks of IV vancomycin and briefly required pressors, which have now been weaned off.  Wound culture also grew  Bacteroides and patient completed 4 weeks of Cipro and Flagyl.  Patient remained intubated postoperatively and was successfully extubated on 01/28.  Initially, she was stabilized and transferred to the floor.     On the floor, patient was making some progress and being followed by [facility]; however, a rapid response was called on 02/19 due to somnolence.  ABG was consistent with acute hypercapnic respiratory failure, and she was placed on BiPAP and transferred back to SICU.  Infectious workup was significant for a UA with 39 WBCs, moderate bacteria, and moderate yeast; urine culture ultimately grew Candida for which she was started on Diflucan IV.    3/3 surgery looked at wounds and deferred to wound care  - 2/26 ID consult recommended no antibiotics . Will re-consult them on Monday 3/4 as her leukocytosis is not better.   - monitor         Debility    Mostly wheelchair bound, but able to work with [facility] using walker  - [place]-term acute care facility -abdominal wound requiring dressing, needs intensive PT, on tube feeds requiring extensive SLP.       Dysphagia    She failed speech language therapy and was started on TPN with subsequent placement of IR guided G-tube in transition to tube feeds on 02/10. Surgery replaced tube 3/2 when pt pulled it out.  - c/w tube feeds       Elevated liver enzymes    She was also found to have markedly elevated alk-phos, AST, and ALT.  Hepatology was consulted and felt acute liver injury related to infection versus medication, and not underlying liver disease. And ammonia was checked and was elevated, but hepatology recommended against treatment as they felt patient did not have hepatic encephalopathy.  Right upper quadrant ultrasound showed biliary sludge and gallbladder thickening but was negative for acute cholecystitis.  A subsequent HIDA scan was not consistent with acute cholecystitis; no surgical intervention needed per General surgery evaluation.    - re-consulted  hepatology 2/27 who thought this could be drug induced.  DO not use cipro.  Recommend a trial of ursodiol 600mg BID to help with cholestasis with peak Bili of 12.  If not better in two weeks may need biopsy.   - diarrhea may be bile induced, monitor for improvement and consider cholestyramine if not ebtter       Bradycardia    Around 2/22 pt was in junctional augusto. Cards saw pt and recommended that rate control meds (diltiazem) be held.  Augusto resolved.   - monitor rate             Acute respiratory failure with hypoxia and hypercarbia    Prone to hypercapnic narcosis  - wean o2  - bipap qhs and prn for AMS       Type 2 diabetes mellitus without complication, without long-term current use of insulin    -- acceptable; continue with current treatment & monitor        Moderate asthma without complication    Acute respiratory failure with hypoxia and hypercarbia  Pt prone to hypercapneic narcosis.  Steroid dependant asthma.  - c/w daily breo, theophylline, PRN duonebs  - qhs bipap and prn during the day     Hypomagnesemia    Replace prn       Severe malnutrition    - dietary consulted and appreciate recs; may require temporary TPN  - albumin 2.3 (chronic); will check prealbumin     Coronary artery disease involving native coronary artery of native heart without angina pectoris    - c/w ASA, plavix, statin  - no anginal equivalent       VTE Risk Mitigation (From admission, onward)        Ordered     heparin (porcine) injection 5,000 Units  Every 8 hours      02/18/19 0907     IP VTE HIGH RISK PATIENT  Once      01/25/19 1049     Place sequential compression device  Until discontinued      01/24/19 0027              Aaron Lott MD  Department of Hospital Medicine   [facility] [address]

## 2019-03-03 NOTE — PLAN OF CARE
Problem: Adult Inpatient Plan of Care  Goal: Plan of Care Review  Outcome: Ongoing (interventions implemented as appropriate)  Pt free from any injury or falls, VSS, turned every 2 hours to prevent further skin breakdown. Blood sugars checked every 4 hours and coverage given when needed. Wound care completed. PRN pain medication given for pain with full relief obtained. Ritchie care completed. Will continue to monitor.

## 2019-03-04 LAB
ALBUMIN SERPL BCP-MCNC: 1.9 G/DL
ALP SERPL-CCNC: 1738 U/L
ALT SERPL W/O P-5'-P-CCNC: 47 U/L
ANION GAP SERPL CALC-SCNC: 10 MMOL/L
AST SERPL-CCNC: 139 U/L
BASOPHILS # BLD AUTO: 0.06 K/UL
BASOPHILS NFR BLD: 0.4 %
BILIRUB SERPL-MCNC: 4.9 MG/DL
BUN SERPL-MCNC: 18 MG/DL
CALCIUM SERPL-MCNC: 9 MG/DL
CHLORIDE SERPL-SCNC: 94 MMOL/L
CO2 SERPL-SCNC: 38 MMOL/L
CREAT SERPL-MCNC: 0.7 MG/DL
DIFFERENTIAL METHOD: ABNORMAL
EOSINOPHIL # BLD AUTO: 0.5 K/UL
EOSINOPHIL NFR BLD: 3.3 %
ERYTHROCYTE [DISTWIDTH] IN BLOOD BY AUTOMATED COUNT: 21.1 %
EST. GFR  (AFRICAN AMERICAN): >60 ML/MIN/1.73 M^2
EST. GFR  (NON AFRICAN AMERICAN): >60 ML/MIN/1.73 M^2
GLUCOSE SERPL-MCNC: 106 MG/DL
HCT VFR BLD AUTO: 33.2 %
HGB BLD-MCNC: 9.6 G/DL
IMM GRANULOCYTES # BLD AUTO: 0.1 K/UL
IMM GRANULOCYTES NFR BLD AUTO: 0.7 %
LYMPHOCYTES # BLD AUTO: 3.1 K/UL
LYMPHOCYTES NFR BLD: 21.5 %
MAGNESIUM SERPL-MCNC: 1.9 MG/DL
MCH RBC QN AUTO: 31.1 PG
MCHC RBC AUTO-ENTMCNC: 28.9 G/DL
MCV RBC AUTO: 107 FL
MONOCYTES # BLD AUTO: 1.6 K/UL
MONOCYTES NFR BLD: 11.2 %
NEUTROPHILS # BLD AUTO: 9.2 K/UL
NEUTROPHILS NFR BLD: 62.9 %
NRBC BLD-RTO: 1 /100 WBC
PHOSPHATE SERPL-MCNC: 2.6 MG/DL
PLATELET # BLD AUTO: 181 K/UL
PMV BLD AUTO: 12.5 FL
POCT GLUCOSE: 118 MG/DL (ref 70–110)
POCT GLUCOSE: 125 MG/DL (ref 70–110)
POCT GLUCOSE: 134 MG/DL (ref 70–110)
POCT GLUCOSE: 220 MG/DL (ref 70–110)
POTASSIUM SERPL-SCNC: 3.4 MMOL/L
PREALB SERPL-MCNC: 7 MG/DL
PROT SERPL-MCNC: 6 G/DL
RBC # BLD AUTO: 3.09 M/UL
SODIUM SERPL-SCNC: 142 MMOL/L
WBC # BLD AUTO: 14.6 K/UL

## 2019-03-04 PROCEDURE — 99233 SBSQ HOSP IP/OBS HIGH 50: CPT | Mod: ,,, | Performed by: PHYSICIAN ASSISTANT

## 2019-03-04 PROCEDURE — 25000003 PHARM REV CODE 250: Performed by: HOSPITALIST

## 2019-03-04 PROCEDURE — 36415 COLL VENOUS BLD VENIPUNCTURE: CPT

## 2019-03-04 PROCEDURE — 94761 N-INVAS EAR/PLS OXIMETRY MLT: CPT

## 2019-03-04 PROCEDURE — 94664 DEMO&/EVAL PT USE INHALER: CPT

## 2019-03-04 PROCEDURE — 99232 PR SUBSEQUENT HOSPITAL CARE,LEVL II: ICD-10-PCS | Mod: ,,, | Performed by: HOSPITALIST

## 2019-03-04 PROCEDURE — 99232 SBSQ HOSP IP/OBS MODERATE 35: CPT | Mod: ,,, | Performed by: HOSPITALIST

## 2019-03-04 PROCEDURE — 84134 ASSAY OF PREALBUMIN: CPT

## 2019-03-04 PROCEDURE — 94799 UNLISTED PULMONARY SVC/PX: CPT

## 2019-03-04 PROCEDURE — 11000001 HC ACUTE MED/SURG PRIVATE ROOM

## 2019-03-04 PROCEDURE — 27000221 HC OXYGEN, UP TO 24 HOURS

## 2019-03-04 PROCEDURE — 63600175 PHARM REV CODE 636 W HCPCS: Performed by: STUDENT IN AN ORGANIZED HEALTH CARE EDUCATION/TRAINING PROGRAM

## 2019-03-04 PROCEDURE — 99900035 HC TECH TIME PER 15 MIN (STAT)

## 2019-03-04 PROCEDURE — 25000242 PHARM REV CODE 250 ALT 637 W/ HCPCS: Performed by: HOSPITALIST

## 2019-03-04 PROCEDURE — 97530 THERAPEUTIC ACTIVITIES: CPT

## 2019-03-04 PROCEDURE — 63600175 PHARM REV CODE 636 W HCPCS: Performed by: HOSPITALIST

## 2019-03-04 PROCEDURE — 84100 ASSAY OF PHOSPHORUS: CPT

## 2019-03-04 PROCEDURE — 83735 ASSAY OF MAGNESIUM: CPT

## 2019-03-04 PROCEDURE — 85025 COMPLETE CBC W/AUTO DIFF WBC: CPT

## 2019-03-04 PROCEDURE — 99233 PR SUBSEQUENT HOSPITAL CARE,LEVL III: ICD-10-PCS | Mod: ,,, | Performed by: PHYSICIAN ASSISTANT

## 2019-03-04 PROCEDURE — 80053 COMPREHEN METABOLIC PANEL: CPT

## 2019-03-04 PROCEDURE — 94640 AIRWAY INHALATION TREATMENT: CPT

## 2019-03-04 RX ORDER — POTASSIUM CHLORIDE 20 MEQ/15ML
40 SOLUTION ORAL
Status: COMPLETED | OUTPATIENT
Start: 2019-03-04 | End: 2019-03-04

## 2019-03-04 RX ORDER — FUROSEMIDE 40 MG/1
40 TABLET ORAL DAILY
Status: DISCONTINUED | OUTPATIENT
Start: 2019-03-05 | End: 2019-03-07

## 2019-03-04 RX ORDER — SODIUM,POTASSIUM PHOSPHATES 280-250MG
1 POWDER IN PACKET (EA) ORAL
Status: DISPENSED | OUTPATIENT
Start: 2019-03-04 | End: 2019-03-07

## 2019-03-04 RX ADMIN — COLLAGENASE SANTYL: 250 OINTMENT TOPICAL at 08:03

## 2019-03-04 RX ADMIN — THEOPHYLLINE ANHYDROUS 300 MG: 100 CAPSULE, EXTENDED RELEASE ORAL at 08:03

## 2019-03-04 RX ADMIN — INSULIN ASPART 4 UNITS: 100 INJECTION, SOLUTION INTRAVENOUS; SUBCUTANEOUS at 05:03

## 2019-03-04 RX ADMIN — ASPIRIN 81 MG CHEWABLE TABLET 81 MG: 81 TABLET CHEWABLE at 08:03

## 2019-03-04 RX ADMIN — DIPHENHYDRAMINE HYDROCHLORIDE 12.5 MG: 50 INJECTION, SOLUTION INTRAMUSCULAR; INTRAVENOUS at 02:03

## 2019-03-04 RX ADMIN — CLOPIDOGREL 75 MG: 75 TABLET, FILM COATED ORAL at 08:03

## 2019-03-04 RX ADMIN — POTASSIUM & SODIUM PHOSPHATES POWDER PACK 280-160-250 MG 1 PACKET: 280-160-250 PACK at 05:03

## 2019-03-04 RX ADMIN — HEPARIN SODIUM 5000 UNITS: 5000 INJECTION, SOLUTION INTRAVENOUS; SUBCUTANEOUS at 01:03

## 2019-03-04 RX ADMIN — HEPARIN SODIUM 5000 UNITS: 5000 INJECTION, SOLUTION INTRAVENOUS; SUBCUTANEOUS at 05:03

## 2019-03-04 RX ADMIN — MICONAZOLE NITRATE: 20 OINTMENT TOPICAL at 09:03

## 2019-03-04 RX ADMIN — POTASSIUM & SODIUM PHOSPHATES POWDER PACK 280-160-250 MG 1 PACKET: 280-160-250 PACK at 09:03

## 2019-03-04 RX ADMIN — URSODIOL 500 MG: 250 TABLET, FILM COATED ORAL at 09:03

## 2019-03-04 RX ADMIN — IPRATROPIUM BROMIDE AND ALBUTEROL SULFATE 3 ML: .5; 3 SOLUTION RESPIRATORY (INHALATION) at 01:03

## 2019-03-04 RX ADMIN — URSODIOL 500 MG: 250 TABLET, FILM COATED ORAL at 08:03

## 2019-03-04 RX ADMIN — HEPARIN SODIUM 5000 UNITS: 5000 INJECTION, SOLUTION INTRAVENOUS; SUBCUTANEOUS at 09:03

## 2019-03-04 RX ADMIN — POTASSIUM CHLORIDE 40 MEQ: 20 SOLUTION ORAL at 01:03

## 2019-03-04 RX ADMIN — PREDNISONE 10 MG: 5 TABLET ORAL at 08:03

## 2019-03-04 RX ADMIN — HYDROMORPHONE HYDROCHLORIDE 0.5 MG: 1 INJECTION, SOLUTION INTRAMUSCULAR; INTRAVENOUS; SUBCUTANEOUS at 08:03

## 2019-03-04 RX ADMIN — FLUTICASONE FUROATE AND VILANTEROL TRIFENATATE 1 PUFF: 100; 25 POWDER RESPIRATORY (INHALATION) at 08:03

## 2019-03-04 RX ADMIN — IPRATROPIUM BROMIDE AND ALBUTEROL SULFATE 3 ML: .5; 3 SOLUTION RESPIRATORY (INHALATION) at 08:03

## 2019-03-04 RX ADMIN — FUROSEMIDE 40 MG: 10 INJECTION, SOLUTION INTRAVENOUS at 08:03

## 2019-03-04 RX ADMIN — MICONAZOLE NITRATE: 20 OINTMENT TOPICAL at 08:03

## 2019-03-04 RX ADMIN — DULOXETINE 60 MG: 60 CAPSULE, DELAYED RELEASE ORAL at 08:03

## 2019-03-04 RX ADMIN — HYDROMORPHONE HYDROCHLORIDE 0.5 MG: 1 INJECTION, SOLUTION INTRAMUSCULAR; INTRAVENOUS; SUBCUTANEOUS at 02:03

## 2019-03-04 RX ADMIN — HYDROMORPHONE HYDROCHLORIDE 0.5 MG: 1 INJECTION, SOLUTION INTRAMUSCULAR; INTRAVENOUS; SUBCUTANEOUS at 06:03

## 2019-03-04 RX ADMIN — DIPHENHYDRAMINE HYDROCHLORIDE 12.5 MG: 50 INJECTION, SOLUTION INTRAMUSCULAR; INTRAVENOUS at 08:03

## 2019-03-04 RX ADMIN — PANTOPRAZOLE SODIUM 40 MG: 40 GRANULE, DELAYED RELEASE ORAL at 08:03

## 2019-03-04 RX ADMIN — POTASSIUM CHLORIDE 40 MEQ: 20 SOLUTION ORAL at 11:03

## 2019-03-04 RX ADMIN — POTASSIUM & SODIUM PHOSPHATES POWDER PACK 280-160-250 MG 1 PACKET: 280-160-250 PACK at 11:03

## 2019-03-04 RX ADMIN — HYDROMORPHONE HYDROCHLORIDE 0.5 MG: 1 INJECTION, SOLUTION INTRAMUSCULAR; INTRAVENOUS; SUBCUTANEOUS at 01:03

## 2019-03-04 NOTE — PROGRESS NOTES
"[facility] Hospital Medicine  Progress Note    Patient Name: Sheryl Martines  MRN: [id number]  Patient Class: IP- Inpatient   Admission Date: 1/23/2019  Length of Stay: 39 days  Attending Physician: Aaron Lott MD  Primary Care Provider: Primary Doctor [facility] Medicine Team: [facility] [place] Aaron Lott MD    Subjective:     Principal Problem:Acute on chronic diastolic (congestive) heart failure    HPI:  Sheryl Martines is a 62 yo female with medical history of asthma, COPD, CAD, h/o T2DM, hypertension, CVA 2012 with R sided deficits, HFpEF, severe debility, and lap converted to open appendectomy 8/2018 c/b C diff, FTT, and a round infection presents to the ED for acute on chronic increased generalized abdominal pain with associated NBNB emesis, nausea and decreased appetite.   Abd pain is normally 5/10 but today is 7/10.  Pain is described as a"ache" and is non-radiating, non-exertional and unrelated to PO intake. These symptoms are waxing and waning since 8/2018 and usually takes zofran for sxs.  Pt states she has had decreased appetite over the past few weeks, intermittently able to keep down solid food, but usually only able to eat things like chicken broth.    She came to ED today since she was weak described as "feeling bad".  Patient states last bowel movement was prior to admission, no diarrhea.  Patient's family unsure if patient has any recent fevers, but patient denies any f/c/night sweats.  Patient denies any vision changes, dizziness, LH, changes in urine output, dysuria, chest pain or shortness of breath.      In ED, HR elevated to 90-110s, BP stable, 100% on RA and afebrile. No leukocytosis but CRP 78 (ESR 17). T bili 1.7 (BL .7 to 1.1).  Alk phos 400s (BL).  Lipase normal, liver enzymes unremarkable. Ua shows 3 hyaline casts, 2+ protein, 1+ ketones, 1+ bilirubin.  Given reglan, zofran, protonix and duonebs in the ED for sxs.  CT A/P with contrast shows " ""Posterior right hemipelvis rim enhancing fluid collection, suspicious for abscess.  Possible associated retained appendicolith.  Adjacent urinary bladder wall thickening, suspicious for superimposed cystitis."  Gen surgery consulted and recs given. CT shows stability in size compared to CT on 9/30/18 and actually has decreased in size.  No emergent intervention recommended.     Hospital Course:  See problem list    Interval History:   Symptoms:   Same.    Diet:  Tolerating TF  Activity level:  Impaired due to weakness.    Pain:  No pain.       Review of Systems   Constitutional: Negative for fever.   Respiratory: Negative for shortness of breath.    Cardiovascular: Negative for chest pain.   Gastrointestinal: Positive for diarrhea (green and watery). Negative for abdominal pain.     Objective:     Vital Signs (Most Recent):  Temp: 98.3 °F (36.8 °C) (03/04/19 1233)  Pulse: 89 (03/04/19 1323)  Resp: 16 (03/04/19 1323)  BP: 131/68 (03/04/19 1233)  SpO2: 100 % (03/04/19 1323) Vital Signs (24h Range):  Temp:  [97.9 °F (36.6 °C)-98.5 °F (36.9 °C)] 98.3 °F (36.8 °C)  Pulse:  [] 89  Resp:  [14-18] 16  SpO2:  [93 %-100 %] 100 %  BP: (123-139)/(60-79) 131/68     Weight: 66.8 kg (147 lb 4.3 oz)  Body mass index is 26.94 kg/m².    Intake/Output Summary (Last 24 hours) at 3/4/2019 1437  Last data filed at 3/4/2019 1400  Gross per 24 hour   Intake 2080 ml   Output 2700 ml   Net -620 ml      Physical Exam   Constitutional: No distress.   Neck: No JVD present.   Cardiovascular: Normal rate, regular rhythm and normal heart sounds.   Pulmonary/Chest: Effort normal. No respiratory distress. She has no wheezes. She has no rales.   Abdominal: Soft. Bowel sounds are normal. She exhibits no distension.   Musculoskeletal: She exhibits no edema.   Neurological: She is alert.     Significant Labs:  CBC:  Recent Labs   Lab 03/03/19  0722 03/04/19  0724   WBC 16.45* 14.60*   HGB 9.9* 9.6*   HCT 33.2* 33.2*    181     CMP:  Recent " Labs   Lab 03/03/19  0722 03/04/19  0724    142   K 3.7 3.4*   CL 94* 94*   CO2 38* 38*   * 106   BUN 19 18   CREATININE 0.7 0.7   CALCIUM 9.2 9.0   PROT 5.8* 6.0   ALBUMIN 1.9* 1.9*   BILITOT 6.0* 4.9*   ALKPHOS 2,006* 1,738*   AST 90* 139*   ALT 43 47*   ANIONGAP 9 10   EGFRNONAA >60.0 >60.0     PTINR:  No results for input(s): INR in the last 48 hours.        Assessment/Plan:      * Acute on chronic diastolic (congestive) heart failure    Patient's mental status improved in the ICU and is now on nasal cannula oxygen and BiPAP at night.  Medicine initially consulted 2/22 for assistance with management and noted patient to be markedly fluid overloaded, estimated to be approximately net positive 16 L with this admission.  Weight had increased from 71-75kg to 87kg.   She had elevated CVP and TTE was consistent volume overload as well as her BNP of > 4900.     2/22 IV diuresis with Lasix 40 bid  with improvement in urine output.  3/2 weight down to 73kg which is near her dry weight per epic but BNP was 2600;   -3/4 switched to PO lasix as her weight is now 66kg and exam is improved.  DTR states that patient at home is prone to dehydration with lasix so it is not used daily.     Generalized abdominal pain    Due to recent Pelvic abscess in female  CT abdomen showed a 4 x 3 cm enhancing fluid collection consistent with abscess.  She also had elevated lactate of 6.8.  She was admitted to the STICU service (Dr. Laurent) for initial management. Patient taken to OR on 01/25 for ex lap with drainage of abscess.  Postoperative course complicated by septic shock secondary to MRSA bacteremia for which she received 2 weeks of IV vancomycin and briefly required pressors, which have now been weaned off.  Wound culture also grew Bacteroides and patient completed 4 weeks of Cipro and Flagyl.  Patient remained intubated postoperatively and was successfully extubated on 01/28.  Initially, she was stabilized and  transferred to the floor.     On the floor, patient was making some progress and being followed by [facility]; however, a rapid response was called on 02/19 due to somnolence.  ABG was consistent with acute hypercapnic respiratory failure, and she was placed on BiPAP and transferred back to SICU.  Infectious workup was significant for a UA with 39 WBCs, moderate bacteria, and moderate yeast; urine culture ultimately grew Candida for which she was started on Diflucan IV.    3/3 surgery looked at wounds and deferred to wound care  - 2/26 ID consult recommended no antibiotics . Will re-consult them on Monday 3/4 as her leukocytosis is not better.   - monitor         Debility    Mostly wheelchair bound, but able to work with [facility] using walker  - [facility] care facility -abdominal wound requiring dressing, needs intensive PT, on tube feeds requiring extensive SLP.       Dysphagia    She failed speech language therapy and was started on TPN with subsequent placement of IR guided G-tube in transition to tube feeds on 02/10. Surgery replaced tube 3/2 when pt pulled it out.  - c/w tube feeds       Elevated liver enzymes    She was also found to have markedly elevated alk-phos, AST, and ALT.  Hepatology was consulted and felt acute liver injury related to infection versus medication, and not underlying liver disease. And ammonia was checked and was elevated, but hepatology recommended against treatment as they felt patient did not have hepatic encephalopathy.  Right upper quadrant ultrasound showed biliary sludge and gallbladder thickening but was negative for acute cholecystitis.  A subsequent HIDA scan was not consistent with acute cholecystitis; no surgical intervention needed per General surgery evaluation.    - re-consulted hepatology 2/27 who thought this could be drug induced.  DO not use cipro.  Recommend a trial of ursodiol 600mg BID to help with cholestasis with peak Bili of 12.  If not better in two  weeks may need biopsy.   - diarrhea may be bile induced, monitor for improvement and consider cholestyramine if not better       Bradycardia    Around 2/22 pt was in junctional augusto. Cards saw pt and recommended that rate control meds (diltiazem) be held.  Augusto resolved.   - monitor rate             Acute respiratory failure with hypoxia and hypercarbia    Prone to hypercapnic narcosis  - wean o2  - bipap qhs and prn for AMS       Type 2 diabetes mellitus without complication, without long-term current use of insulin    -- acceptable; continue with current treatment & monitor        Moderate asthma without complication    Acute respiratory failure with hypoxia and hypercarbia  Pt prone to hypercapneic narcosis.  Steroid dependant asthma.  - c/w daily breo, theophylline, PRN duonebs  - qhs bipap and prn during the day     Hypomagnesemia    Replace prn       Severe malnutrition    - dietary consulted and appreciate recs; TF       Coronary artery disease involving native coronary artery of native heart without angina pectoris    - c/w ASA, plavix, statin  - no anginal equivalent       VTE Risk Mitigation (From admission, onward)        Ordered     heparin (porcine) injection 5,000 Units  Every 8 hours      02/18/19 0907     IP VTE HIGH RISK PATIENT  Once      01/25/19 1049     Place sequential compression device  Until discontinued      01/24/19 0027              Aaron Lott MD  Department of Hospital Medicine   [facility]

## 2019-03-04 NOTE — SUBJECTIVE & OBJECTIVE
Interval History:   Symptoms:   Same.    Diet:  Tolerating TF  Activity level:  Impaired due to weakness.    Pain:  No pain.       Review of Systems   Constitutional: Negative for fever.   Respiratory: Negative for shortness of breath.    Cardiovascular: Negative for chest pain.   Gastrointestinal: Positive for diarrhea (green and watery). Negative for abdominal pain.     Objective:     Vital Signs (Most Recent):  Temp: 98.3 °F (36.8 °C) (03/04/19 1233)  Pulse: 89 (03/04/19 1323)  Resp: 16 (03/04/19 1323)  BP: 131/68 (03/04/19 1233)  SpO2: 100 % (03/04/19 1323) Vital Signs (24h Range):  Temp:  [97.9 °F (36.6 °C)-98.5 °F (36.9 °C)] 98.3 °F (36.8 °C)  Pulse:  [] 89  Resp:  [14-18] 16  SpO2:  [93 %-100 %] 100 %  BP: (123-139)/(60-79) 131/68     Weight: 66.8 kg (147 lb 4.3 oz)  Body mass index is 26.94 kg/m².    Intake/Output Summary (Last 24 hours) at 3/4/2019 1437  Last data filed at 3/4/2019 1400  Gross per 24 hour   Intake 2080 ml   Output 2700 ml   Net -620 ml      Physical Exam   Constitutional: No distress.   Neck: No JVD present.   Cardiovascular: Normal rate, regular rhythm and normal heart sounds.   Pulmonary/Chest: Effort normal. No respiratory distress. She has no wheezes. She has no rales.   Abdominal: Soft. Bowel sounds are normal. She exhibits no distension.   Musculoskeletal: She exhibits no edema.   Neurological: She is alert.     Significant Labs:  CBC:  Recent Labs   Lab 03/03/19  0722 03/04/19  0724   WBC 16.45* 14.60*   HGB 9.9* 9.6*   HCT 33.2* 33.2*    181     CMP:  Recent Labs   Lab 03/03/19  0722 03/04/19  0724    142   K 3.7 3.4*   CL 94* 94*   CO2 38* 38*   * 106   BUN 19 18   CREATININE 0.7 0.7   CALCIUM 9.2 9.0   PROT 5.8* 6.0   ALBUMIN 1.9* 1.9*   BILITOT 6.0* 4.9*   ALKPHOS 2,006* 1,738*   AST 90* 139*   ALT 43 47*   ANIONGAP 9 10   EGFRNONAA >60.0 >60.0     PTINR:  No results for input(s): INR in the last 48 hours.

## 2019-03-04 NOTE — ASSESSMENT & PLAN NOTE
She was also found to have markedly elevated alk-phos, AST, and ALT.  Hepatology was consulted and felt acute liver injury related to infection versus medication, and not underlying liver disease. And ammonia was checked and was elevated, but hepatology recommended against treatment as they felt patient did not have hepatic encephalopathy.  Right upper quadrant ultrasound showed biliary sludge and gallbladder thickening but was negative for acute cholecystitis.  A subsequent HIDA scan was not consistent with acute cholecystitis; no surgical intervention needed per General surgery evaluation.    - re-consulted hepatology 2/27 who thought this could be drug induced.  DO not use cipro.  Recommend a trial of ursodiol 600mg BID to help with cholestasis with peak Bili of 12.  If not better in two weeks may need biopsy.   - diarrhea may be bile induced, monitor for improvement and consider cholestyramine if not better

## 2019-03-04 NOTE — ASSESSMENT & PLAN NOTE
Patient's mental status improved in the ICU and is now on nasal cannula oxygen and BiPAP at night.  Medicine initially consulted 2/22 for assistance with management and noted patient to be markedly fluid overloaded, estimated to be approximately net positive 16 L with this admission.  Weight had increased from 71-75kg to 87kg.   She had elevated CVP and TTE was consistent volume overload as well as her BNP of > 4900.    2/22 IV diuresis with Lasix 40 bid  with improvement in urine output.  3/2 weight down to 73kg which is near her dry weight per epic but BNP was 2600;   -3/4 switched to PO lasix as her weight is now 66kg and exam is improved.  DTR states that patient at home is prone to dehydration with lasix so it is not used daily.

## 2019-03-04 NOTE — SUBJECTIVE & OBJECTIVE
Interval History: ID re-consulted as patient noted to have a leukocytosis after stopping abx. No new complaints or changes. Afebrile.    Review of Systems   Constitutional: Positive for fatigue. Negative for chills, diaphoresis and fever.   HENT: Negative for postnasal drip.    Respiratory: Negative for cough, chest tightness, shortness of breath and wheezing.    Cardiovascular: Negative for chest pain and leg swelling.   Gastrointestinal: Positive for abdominal pain (stable). Negative for constipation, diarrhea, nausea and vomiting.   Genitourinary: Negative for difficulty urinating, dysuria, flank pain, frequency, hematuria and urgency.   Musculoskeletal: Negative for arthralgias and back pain.   Skin: Negative for color change, pallor, rash and wound.   Neurological: Negative for dizziness, weakness, numbness and headaches.   Psychiatric/Behavioral: Negative for confusion and dysphoric mood. The patient is not nervous/anxious.      Objective:     Vital Signs (Most Recent):  Temp: 98.3 °F (36.8 °C) (03/04/19 1233)  Pulse: 89 (03/04/19 1323)  Resp: 16 (03/04/19 1323)  BP: 131/68 (03/04/19 1233)  SpO2: 100 % (03/04/19 1323) Vital Signs (24h Range):  Temp:  [97.9 °F (36.6 °C)-98.5 °F (36.9 °C)] 98.3 °F (36.8 °C)  Pulse:  [] 89  Resp:  [14-18] 16  SpO2:  [93 %-100 %] 100 %  BP: (123-139)/(60-79) 131/68     Weight: 66.8 kg (147 lb 4.3 oz)  Body mass index is 26.94 kg/m².    Estimated Creatinine Clearance: 73.8 mL/min (based on SCr of 0.7 mg/dL).    Physical Exam   Constitutional: She appears well-developed.   HENT:   Head: Normocephalic and atraumatic.   Eyes: Conjunctivae are normal. Right eye exhibits no discharge. Left eye exhibits no discharge.   Neck: Neck supple.   Cardiovascular: Normal rate and regular rhythm. Exam reveals no friction rub.   No murmur heard.  Pulmonary/Chest: Breath sounds normal. She has no wheezes. She has no rales.   Abdominal:   G tube, rectal tube and nieves in place.  Midline  incision with some distal dehiscence and slough in wound base. No purulence or malodor. Right lower abdominal wound also noted to have tan slough.   Musculoskeletal: She exhibits edema. She exhibits no deformity.   Skin: Skin is warm and dry. She is not diaphoretic.   Psychiatric: She has a normal mood and affect. Her behavior is normal.       Significant Labs: All pertinent labs within the past 24 hours have been reviewed.    Significant Imaging: I have reviewed all pertinent imaging results/findings within the past 24 hours.

## 2019-03-04 NOTE — ASSESSMENT & PLAN NOTE
63 year old female with hx of asthma, COPD, CAD, DM, HTN, CVA 2012 with R side deficits, HFpEF, severe debility, lap converted to open appendectomy 8/2018 complicated by c diff, failure to thrive and recurrent fluid collection presented to the ED 1/23 with intractable n/v and abdominal pain found to have  fluid collection in the R hemipelvis on CT along with MRSA bacteremia. She underwent exploratory laparotomy, washout, and pelvic abscess drainage on 1/25/19. Surgical cultures returned with bacteroides. Gram stain +GNR. She completed ~ 5 weeks of ciprofloxacin and flagyl and repeat CT showed resolution of prior fluid collections. She also completed two weeks of Vancomycin for MRSA with negative repeat cultures.     Antibiotics were stopped on 2/26. Since that time she has had an increasing WBC count up to 16.4K prompting ID consult. She is afebrile. CXR without focal consolidations. UA not impressive. Wounds with slough overlying wound bases without monika purulence or malodor. No new complaints. WBC now trending down.       Plan  - Continue to monitor patient off of antibiotics as she is afebrile, leukocytosis is trending back down and she is without evidence of new infection.  - Continue aggressive wound care to abdominal wounds.  - Remove/exchange all lines/catheters as able.   - Liver function appears to be improving. Hepatology has evaluated patient.  - ID will sign off at this time. Please call or re-consult ID for any questions or concerns.

## 2019-03-04 NOTE — PROGRESS NOTES
[facility]  Infectious Disease  Progress Note    Patient Name: Sheryl Martines  MRN: [id number]  Admission Date: 1/23/2019  Length of Stay: 39 days  Attending Physician: Aaron Lott MD  Primary Care Provider: Primary Doctor No    Isolation Status: No active isolations  Assessment/Plan:      Pelvic abscess in female    63 year old female with hx of asthma, COPD, CAD, DM, HTN, CVA 2012 with R side deficits, HFpEF, severe debility, lap converted to open appendectomy 8/2018 complicated by c diff, failure to thrive and recurrent fluid collection presented to the ED 1/23 with intractable n/v and abdominal pain found to have  fluid collection in the R hemipelvis on CT along with MRSA bacteremia. She underwent exploratory laparotomy, washout, and pelvic abscess drainage on 1/25/19. Surgical cultures returned with bacteroides. Gram stain +GNR. She completed ~ 5 weeks of ciprofloxacin and flagyl and repeat CT showed resolution of prior fluid collections. She also completed two weeks of Vancomycin for MRSA with negative repeat cultures.     Antibiotics were stopped on 2/26. Since that time she has had an increasing WBC count up to 16.4K prompting ID consult. She is afebrile. CXR without focal consolidations. UA not impressive. Wounds with slough overlying wound bases without monika purulence or malodor. No new complaints. WBC now trending down.       Plan  - Continue to monitor patient off of antibiotics as she is afebrile, leukocytosis is trending back down and she is without evidence of new infection.  - Continue aggressive wound care to abdominal wounds.  - Remove/exchange all lines/catheters as able.   - Liver function appears to be improving. Hepatology has evaluated patient.  - ID will sign off at this time. Please call or re-consult ID for any questions or concerns.            Please call for any questions. Thank you.  Sierra Sprague PA-C  Phone: [phone]  Pager: [phone]    Subjective:      Principal Problem:Acute on chronic diastolic (congestive) heart failure    HPI: Pt is a 63 y.o. female with hx of asthma, COPD, CAD, DM, HTN, CVA 2012 with R side deficits, HFpEF, severe debility , lap converted to open appendectomy 8/2018 complicated by cdiff, failure to thrive and recurrent fluid collection presented to the ED with intractable n/v and abdominal pain.      Appendectomy complcated by sx site infxn with recurrent fluid collection.  Cxs- MRSA, Enterococcus, Ecoli and Anaerobes.   Abx treatment Vanc 9/11-10/1;  Pip-tazo 9/11-9/13, 9/23-10/1; Ertapenem 9/14-9/22.  No change in fluid collection.  Abx discontinued on 10/1. Lactic acid 6.8, with tachycardia and WBC of 13.  1/24 CT shows fluid collection in R hemipelvis.  Started on vanc and zosyn. Patient now s/p exploratory laparotomy, washout, and pelvic abscess drainage on 1/25/19. Gross purulence.  Blood cx positive for MRSA on 1/24.     Of note pt w/ R subclavian port o cath that has been present since 2005  Interval History: ID re-consulted as patient noted to have a leukocytosis after stopping abx. No new complaints or changes. Afebrile.    Review of Systems   Constitutional: Positive for fatigue. Negative for chills, diaphoresis and fever.   HENT: Negative for postnasal drip.    Respiratory: Negative for cough, chest tightness, shortness of breath and wheezing.    Cardiovascular: Negative for chest pain and leg swelling.   Gastrointestinal: Positive for abdominal pain (stable). Negative for constipation, diarrhea, nausea and vomiting.   Genitourinary: Negative for difficulty urinating, dysuria, flank pain, frequency, hematuria and urgency.   Musculoskeletal: Negative for arthralgias and back pain.   Skin: Negative for color change, pallor, rash and wound.   Neurological: Negative for dizziness, weakness, numbness and headaches.   Psychiatric/Behavioral: Negative for confusion and dysphoric mood. The patient is not nervous/anxious.       Objective:     Vital Signs (Most Recent):  Temp: 98.3 °F (36.8 °C) (03/04/19 1233)  Pulse: 89 (03/04/19 1323)  Resp: 16 (03/04/19 1323)  BP: 131/68 (03/04/19 1233)  SpO2: 100 % (03/04/19 1323) Vital Signs (24h Range):  Temp:  [97.9 °F (36.6 °C)-98.5 °F (36.9 °C)] 98.3 °F (36.8 °C)  Pulse:  [] 89  Resp:  [14-18] 16  SpO2:  [93 %-100 %] 100 %  BP: (123-139)/(60-79) 131/68     Weight: 66.8 kg (147 lb 4.3 oz)  Body mass index is 26.94 kg/m².    Estimated Creatinine Clearance: 73.8 mL/min (based on SCr of 0.7 mg/dL).    Physical Exam   Constitutional: She appears well-developed.   HENT:   Head: Normocephalic and atraumatic.   Eyes: Conjunctivae are normal. Right eye exhibits no discharge. Left eye exhibits no discharge.   Neck: Neck supple.   Cardiovascular: Normal rate and regular rhythm. Exam reveals no friction rub.   No murmur heard.  Pulmonary/Chest: Breath sounds normal. She has no wheezes. She has no rales.   Abdominal:   G tube, rectal tube and nieves in place.  Midline incision with some distal dehiscence and slough in wound base. No purulence or malodor. Right lower abdominal wound also noted to have tan slough.   Musculoskeletal: She exhibits edema. She exhibits no deformity.   Skin: Skin is warm and dry. She is not diaphoretic.   Psychiatric: She has a normal mood and affect. Her behavior is normal.       Significant Labs: All pertinent labs within the past 24 hours have been reviewed.    Significant Imaging: I have reviewed all pertinent imaging results/findings within the past 24 hours.

## 2019-03-04 NOTE — PT/OT/SLP PROGRESS
"Physical Therapy Treatment    Patient Name:  Sheryl Martines   MRN:  [id number]    Recommendations:     Discharge Recommendations:  nursing facility, skilled   Discharge Equipment Recommendations: hospital bed   Barriers to discharge: Decreased caregiver support    Assessment:     Sheryl Martines is a 63 y.o. female admitted with a medical diagnosis of Acute on chronic diastolic (congestive) heart failure.  She presents with the following impairments/functional limitations:  weakness, impaired endurance, impaired self care skills, impaired functional mobilty, gait instability, impaired balance, decreased upper extremity function, decreased lower extremity function, decreased safety awareness, pain, decreased ROM, impaired joint extensibility. Pt tolerated session well with focus on bed mobility, EOB sitting balance/endurance, and transfers. Pt progressing slowly but consistently at this time with minor improvements in bed mobility and endurance at EOB. Pt will continue to benefit from therapy services to improve impairments listed above.     Rehab Prognosis: Fair; patient would benefit from acute skilled PT services to address these deficits and reach maximum level of function.    Recent Surgery: Procedure(s) (LRB):  LAPAROTOMY, EXPLORATORY (N/A)  INCISION AND DRAINAGE, ABSCESS-  drainage of pelvic abscess (N/A)  EXCISION, ABSCESS- drainage abdominal wall abscess (N/A)  APPLICATION, WOUND VAC (N/A) 38 Days Post-Op    Plan:     During this hospitalization, patient to be seen 3 x/week to address the identified rehab impairments via gait training, therapeutic activities, therapeutic exercises, neuromuscular re-education and progress toward the following goals:    · Plan of Care Expires:  03/24/19    Subjective     Chief Complaint: no c/o  Patient/Family Comments/goals: "I've just been waiting for a bath so I'm ready for the girls to come take care of that."   Pain/Comfort:  · Pain Rating 1: 0/10  · Pain Rating " Post-Intervention 1: 0/10      Objective:     Communicated with NSG prior to session.  Patient found all lines intact, call button in reach, daughter  present and pt supine with daughter attempting to pull pt up in bed with poor body mechanics with telemetry, bowel management system, pressure relief boots, peripheral IV, oxygen, PEG Tube  upon PTA entry to room. Daughter asked to stop attempted repositioning and educated on risk of injury d/t body mechanics. Educated on improved posture for safe body mechanics and pt pulled up in bed with drawsheet x 2.     General Precautions: Standard, aspiration, fall, NPO   Orthopedic Precautions:N/A   Braces: N/A     Functional Mobility:  · Bed Mobility:     · Supine to Sit: moderate assistance  · Sit to Supine: maximal assistance  · Transfers:     · Sit to Stand:  maximal assistance with no AD  · Balance: Pt sits at EOB with SBA to Mod A for balance and safety.       AM-PAC 6 CLICK MOBILITY  Turning over in bed (including adjusting bedclothes, sheets and blankets)?: 2  Sitting down on and standing up from a chair with arms (e.g., wheelchair, bedside commode, etc.): 2  Moving from lying on back to sitting on the side of the bed?: 2  Moving to and from a bed to a chair (including a wheelchair)?: 1  Need to walk in hospital room?: 1  Climbing 3-5 steps with a railing?: 1  Basic Mobility Total Score: 9       Therapeutic Activities and Exercises:   Pt assisted with functional mobility as noted above.   Pt sits at EOB ~20 minutes focus on BUE support and midline balance with pt tendency to lean down to R elbow for support.   Pt performs standing x 2 trials at EOB with Max A of 1 therapist for pt to elevated and maintain stand. Each stand maintained for ~20 seconds prior to return to sitting at EOB.   Pt and daughter educated on safety with all mobility, importance of increased time upright, and PT POC.     Patient left HOB elevated with all lines intact, call button in reach and  daughter present.    GOALS:   Multidisciplinary Problems     Physical Therapy Goals        Problem: Physical Therapy Goal    Goal Priority Disciplines Outcome Goal Variances Interventions   Physical Therapy Goal     PT, PT/OT Ongoing (interventions implemented as appropriate)     Description:  Goals to be met by:3/8/19    Patient will increase functional independence with mobility by performin. Supine to sit with Moderate Assistance - met  2. Sit to stand transfer with Maximum Assistance - met  3. Bed to chair transfer with Maximum Assistance -not met  4. Sitting at edge of bed x10 minutes with Contact Guard Assistance - not met  5. Lower extremity exercise program x10 reps, with assistance as needed - not met                           Time Tracking:     PT Received On: 19  PT Start Time: 0935     PT Stop Time: 1006  PT Total Time (min): 31 min     Billable Minutes: Therapeutic Activity 31    Treatment Type: Treatment  PT/PTA: PTA     PTA Visit Number: 2     Diogo Mirza PTA  2019

## 2019-03-04 NOTE — PLAN OF CARE
Problem: Physical Therapy Goal  Goal: Physical Therapy Goal  Goals to be met by:3/8/19    Patient will increase functional independence with mobility by performin. Supine to sit with Moderate Assistance - met  2. Sit to stand transfer with Maximum Assistance - met  3. Bed to chair transfer with Maximum Assistance -not met  4. Sitting at edge of bed x10 minutes with Contact Guard Assistance - not met  5. Lower extremity exercise program x10 reps, with assistance as needed - not met         Outcome: Ongoing (interventions implemented as appropriate)  2 goals met. Goals remain appropriate.

## 2019-03-05 LAB
ALBUMIN SERPL BCP-MCNC: 2 G/DL
ALP SERPL-CCNC: 1618 U/L
ALT SERPL W/O P-5'-P-CCNC: 77 U/L
ANION GAP SERPL CALC-SCNC: 6 MMOL/L
AST SERPL-CCNC: 308 U/L
BASOPHILS # BLD AUTO: 0.1 K/UL
BASOPHILS NFR BLD: 0.7 %
BILIRUB SERPL-MCNC: 4.4 MG/DL
BUN SERPL-MCNC: 20 MG/DL
CALCIUM SERPL-MCNC: 9.4 MG/DL
CHLORIDE SERPL-SCNC: 96 MMOL/L
CO2 SERPL-SCNC: 36 MMOL/L
CREAT SERPL-MCNC: 0.7 MG/DL
DIFFERENTIAL METHOD: ABNORMAL
EOSINOPHIL # BLD AUTO: 0.5 K/UL
EOSINOPHIL NFR BLD: 3.2 %
ERYTHROCYTE [DISTWIDTH] IN BLOOD BY AUTOMATED COUNT: 20.6 %
EST. GFR  (AFRICAN AMERICAN): >60 ML/MIN/1.73 M^2
EST. GFR  (NON AFRICAN AMERICAN): >60 ML/MIN/1.73 M^2
GLUCOSE SERPL-MCNC: 102 MG/DL
HCT VFR BLD AUTO: 34.9 %
HGB BLD-MCNC: 10.1 G/DL
IMM GRANULOCYTES # BLD AUTO: 0.08 K/UL
IMM GRANULOCYTES NFR BLD AUTO: 0.5 %
LYMPHOCYTES # BLD AUTO: 3.4 K/UL
LYMPHOCYTES NFR BLD: 22.7 %
MAGNESIUM SERPL-MCNC: 2 MG/DL
MCH RBC QN AUTO: 31 PG
MCHC RBC AUTO-ENTMCNC: 28.9 G/DL
MCV RBC AUTO: 107 FL
MONOCYTES # BLD AUTO: 1.9 K/UL
MONOCYTES NFR BLD: 12.8 %
NEUTROPHILS # BLD AUTO: 9 K/UL
NEUTROPHILS NFR BLD: 60.1 %
NRBC BLD-RTO: 1 /100 WBC
PHOSPHATE SERPL-MCNC: 3.2 MG/DL
PLATELET # BLD AUTO: 195 K/UL
PMV BLD AUTO: 12 FL
POCT GLUCOSE: 107 MG/DL (ref 70–110)
POCT GLUCOSE: 108 MG/DL (ref 70–110)
POCT GLUCOSE: 148 MG/DL (ref 70–110)
POCT GLUCOSE: 199 MG/DL (ref 70–110)
POTASSIUM SERPL-SCNC: 4.3 MMOL/L
PROT SERPL-MCNC: 6.3 G/DL
RBC # BLD AUTO: 3.26 M/UL
SODIUM SERPL-SCNC: 138 MMOL/L
WBC # BLD AUTO: 15 K/UL

## 2019-03-05 PROCEDURE — 63600175 PHARM REV CODE 636 W HCPCS: Performed by: STUDENT IN AN ORGANIZED HEALTH CARE EDUCATION/TRAINING PROGRAM

## 2019-03-05 PROCEDURE — 25000242 PHARM REV CODE 250 ALT 637 W/ HCPCS: Performed by: HOSPITALIST

## 2019-03-05 PROCEDURE — 25000003 PHARM REV CODE 250: Performed by: HOSPITALIST

## 2019-03-05 PROCEDURE — 11000001 HC ACUTE MED/SURG PRIVATE ROOM

## 2019-03-05 PROCEDURE — 80053 COMPREHEN METABOLIC PANEL: CPT

## 2019-03-05 PROCEDURE — 94640 AIRWAY INHALATION TREATMENT: CPT

## 2019-03-05 PROCEDURE — 84100 ASSAY OF PHOSPHORUS: CPT

## 2019-03-05 PROCEDURE — 27000190 HC CPAP FULL FACE MASK W/VALVE

## 2019-03-05 PROCEDURE — 99231 SBSQ HOSP IP/OBS SF/LOW 25: CPT | Mod: ,,, | Performed by: INTERNAL MEDICINE

## 2019-03-05 PROCEDURE — 94660 CPAP INITIATION&MGMT: CPT

## 2019-03-05 PROCEDURE — 63600175 PHARM REV CODE 636 W HCPCS: Performed by: HOSPITALIST

## 2019-03-05 PROCEDURE — 94761 N-INVAS EAR/PLS OXIMETRY MLT: CPT

## 2019-03-05 PROCEDURE — 36415 COLL VENOUS BLD VENIPUNCTURE: CPT

## 2019-03-05 PROCEDURE — 83735 ASSAY OF MAGNESIUM: CPT

## 2019-03-05 PROCEDURE — 85025 COMPLETE CBC W/AUTO DIFF WBC: CPT

## 2019-03-05 PROCEDURE — 94664 DEMO&/EVAL PT USE INHALER: CPT

## 2019-03-05 PROCEDURE — 27000221 HC OXYGEN, UP TO 24 HOURS

## 2019-03-05 PROCEDURE — 99900035 HC TECH TIME PER 15 MIN (STAT)

## 2019-03-05 PROCEDURE — 99231 PR SUBSEQUENT HOSPITAL CARE,LEVL I: ICD-10-PCS | Mod: ,,, | Performed by: INTERNAL MEDICINE

## 2019-03-05 RX ADMIN — HYDROMORPHONE HYDROCHLORIDE 0.5 MG: 1 INJECTION, SOLUTION INTRAMUSCULAR; INTRAVENOUS; SUBCUTANEOUS at 04:03

## 2019-03-05 RX ADMIN — COLLAGENASE SANTYL: 250 OINTMENT TOPICAL at 09:03

## 2019-03-05 RX ADMIN — ASPIRIN 81 MG CHEWABLE TABLET 81 MG: 81 TABLET CHEWABLE at 09:03

## 2019-03-05 RX ADMIN — DIPHENHYDRAMINE HYDROCHLORIDE 12.5 MG: 50 INJECTION, SOLUTION INTRAMUSCULAR; INTRAVENOUS at 04:03

## 2019-03-05 RX ADMIN — CLOPIDOGREL 75 MG: 75 TABLET, FILM COATED ORAL at 09:03

## 2019-03-05 RX ADMIN — HEPARIN SODIUM 5000 UNITS: 5000 INJECTION, SOLUTION INTRAVENOUS; SUBCUTANEOUS at 09:03

## 2019-03-05 RX ADMIN — MICONAZOLE NITRATE: 20 OINTMENT TOPICAL at 09:03

## 2019-03-05 RX ADMIN — URSODIOL 500 MG: 250 TABLET, FILM COATED ORAL at 09:03

## 2019-03-05 RX ADMIN — IPRATROPIUM BROMIDE AND ALBUTEROL SULFATE 3 ML: .5; 3 SOLUTION RESPIRATORY (INHALATION) at 02:03

## 2019-03-05 RX ADMIN — DULOXETINE 60 MG: 60 CAPSULE, DELAYED RELEASE ORAL at 09:03

## 2019-03-05 RX ADMIN — DIPHENHYDRAMINE HYDROCHLORIDE 12.5 MG: 50 INJECTION, SOLUTION INTRAMUSCULAR; INTRAVENOUS at 09:03

## 2019-03-05 RX ADMIN — HEPARIN SODIUM 5000 UNITS: 5000 INJECTION, SOLUTION INTRAVENOUS; SUBCUTANEOUS at 05:03

## 2019-03-05 RX ADMIN — PREDNISONE 10 MG: 5 TABLET ORAL at 09:03

## 2019-03-05 RX ADMIN — POTASSIUM & SODIUM PHOSPHATES POWDER PACK 280-160-250 MG 1 PACKET: 280-160-250 PACK at 09:03

## 2019-03-05 RX ADMIN — PANTOPRAZOLE SODIUM 40 MG: 40 GRANULE, DELAYED RELEASE ORAL at 09:03

## 2019-03-05 RX ADMIN — HYDROMORPHONE HYDROCHLORIDE 0.5 MG: 1 INJECTION, SOLUTION INTRAMUSCULAR; INTRAVENOUS; SUBCUTANEOUS at 05:03

## 2019-03-05 RX ADMIN — HYDROMORPHONE HYDROCHLORIDE 0.5 MG: 1 INJECTION, SOLUTION INTRAMUSCULAR; INTRAVENOUS; SUBCUTANEOUS at 10:03

## 2019-03-05 RX ADMIN — HYDROMORPHONE HYDROCHLORIDE 0.5 MG: 1 INJECTION, SOLUTION INTRAMUSCULAR; INTRAVENOUS; SUBCUTANEOUS at 09:03

## 2019-03-05 RX ADMIN — POTASSIUM & SODIUM PHOSPHATES POWDER PACK 280-160-250 MG 1 PACKET: 280-160-250 PACK at 04:03

## 2019-03-05 RX ADMIN — THEOPHYLLINE ANHYDROUS 300 MG: 100 CAPSULE, EXTENDED RELEASE ORAL at 09:03

## 2019-03-05 RX ADMIN — DIPHENHYDRAMINE HYDROCHLORIDE 12.5 MG: 50 INJECTION, SOLUTION INTRAMUSCULAR; INTRAVENOUS at 10:03

## 2019-03-05 RX ADMIN — IPRATROPIUM BROMIDE AND ALBUTEROL SULFATE 3 ML: .5; 3 SOLUTION RESPIRATORY (INHALATION) at 08:03

## 2019-03-05 RX ADMIN — POTASSIUM & SODIUM PHOSPHATES POWDER PACK 280-160-250 MG 1 PACKET: 280-160-250 PACK at 11:03

## 2019-03-05 RX ADMIN — HEPARIN SODIUM 5000 UNITS: 5000 INJECTION, SOLUTION INTRAVENOUS; SUBCUTANEOUS at 01:03

## 2019-03-05 RX ADMIN — IPRATROPIUM BROMIDE AND ALBUTEROL SULFATE 3 ML: .5; 3 SOLUTION RESPIRATORY (INHALATION) at 10:03

## 2019-03-05 RX ADMIN — FUROSEMIDE 40 MG: 40 TABLET ORAL at 09:03

## 2019-03-05 RX ADMIN — INSULIN ASPART 2 UNITS: 100 INJECTION, SOLUTION INTRAVENOUS; SUBCUTANEOUS at 04:03

## 2019-03-05 NOTE — PLAN OF CARE
Problem: Fall Injury Risk  Goal: Absence of Fall and Fall-Related Injury  Outcome: Ongoing (interventions implemented as appropriate)  Patient remains free from falls/injury throughout shift. Bed in lowest position, call light within reach    Problem: Adult Inpatient Plan of Care  Goal: Plan of Care Review  Outcome: Ongoing (interventions implemented as appropriate)  Patient AAOx3 with intermittent confusion. Vitals remain stable. Glucerna infusing continuously at goal rate of 45ml/hr; patient tolerating well; no residuals noted. Will continue to monitor.    Problem: Diabetes Comorbidity  Goal: Blood Glucose Level Within Desired Range  Outcome: Ongoing (interventions implemented as appropriate)  Patient remains free from s/s of hypoglycemia    Problem: Infection  Goal: Infection Symptom Resolution  Outcome: Ongoing (interventions implemented as appropriate)  Patient remains free from s/s of infection; remains afebrile    Problem: Wound  Goal: Optimal Wound Healing  Outcome: Ongoing (interventions implemented as appropriate)  Dressings to abdominal wall remain dry and intact    Problem: Pain Acute  Goal: Optimal Pain Control  Outcome: Ongoing (interventions implemented as appropriate)  No s/s of pain noted this shift

## 2019-03-05 NOTE — PROGRESS NOTES
[facility] Hospital Medicine                                                                     Progress Note     Team: [facility] HOSP MED A Chacho Villar MD   Admit Date: 1/23/2019   Hospital Day: 40  TAMIKO: 3/8/2019   Code status: Full Code   Principal Problem: Acute on chronic diastolic (congestive) heart failure     SUMMARY:     Patient is a 63 y.o. with chronic diastolic heart failure, COPD on home oxygen at 2 liters, chronic debility, CAD, Type 2 diabetes, HTN, previous CVA with residual right sided weakness, prior appendectomy in 8/2018 complicated by C. Diff infection, failure to thrive admitted to the hospital on 1/23/2019 with nausea vomiting and abdominal pain found to have fluid collection in R hemipelvis. Started on vanc/zosyn. Surgery consulted, s/p ex lap, washout and pelvic abscess drainage 1/25. Post-op course complicated by septic shock secondary to MRSA bacteremia for which she received 2 weeks of IV vancomycin and briefly required pressors.  Wound culture also grew Bacteroides and patient completed 4 weeks of Cipro and Flagyl.  Patient remained intubated postoperatively and was successfully extubated on 01/28.  Initially, she was stabilized and transferred to the floor.  She failed speech language therapy and was started on TPN with subsequent placement of IR guided G-tube in transition to tube feeds on 02/10.  On the floor, patient was making some progress and being followed by [facility]; however, a rapid response was called on 02/19 due to somnolence.  ABG was consistent with acute hypercapnic respiratory failure, and she was placed on BiPAP and transferred back to SICU.  Infectious workup was significant for a UA with 39 WBCs, moderate bacteria, and moderate yeast; urine culture ultimately grew Candida for which she was started on Diflucan IV.   She was also found to have markedly elevated alk-phos, AST, and ALT.  Hepatology was consulted and felt acute liver injury related to infection versus medication, and not underlying liver disease. And ammonia was checked and was elevated, but hepatology recommended against treatment as they felt patient did not have hepatic encephalopathy.  Right upper quadrant ultrasound showed biliary sludge and gallbladder thickening but was negative for acute cholecystitis.  A subsequent HIDA scan was not consistent with acute cholecystitis; no surgical intervention needed per General surgery evaluation.  Patient's mental status improved in the ICU and is now on nasal cannula oxygen and BiPAP at night.  Medicine initially consulted for assistance with management and noted patient to be markedly fluid overloaded, estimated to be approximately net positive 16 L with this admission.  She had elevated CVP and TTE was consistent volume overload as well as her BNP of > 4900.  She was started on IV diuresis with Lasix with improvement in urine output.  She is to be stepped down to Hospital Medicine for further management and ultimately skilled nursing facility placement.    SUBJECTIVE:     No acute events overnight. Doing well overall. Aox3. Getting TF. NPO. Remains stable and afebrile.     ROS (Positive in Bold, otherwise negative)  Pain Scale: 2 /10   Constitutional: fever, chills, night sweats  CV: chest pain, edema, palpitations  Resp: SOB, cough, sputum production  GI: changes in appetite, NVDC, pain, melena, hematochezia, GERD, hematemesis  : Dysuria, hematuria, urinary urgency, frequency  MSK: arthralgia/myalgia, joint swelling  SKIN: rashes, pruritis, petechiae   Neuro/Psych: FND, anxiety, depression      OBJECTIVE:     Vitals:  Temp:  [97.8 °F (36.6 °C)-98.3 °F (36.8 °C)]   Pulse:  [62-95]   Resp:  [17-20]   BP: (116-139)/(67-77)   SpO2:  [92 %-100 %]      I & O (Last 24H):     Intake/Output Summary (Last 24 hours) at  3/5/2019 1631  Last data filed at 3/5/2019 1049  Gross per 24 hour   Intake 1380 ml   Output 1250 ml   Net 130 ml         GEN:  in no acute distress. Nontoxic. Resting in bed. Cooperative.  HEENT: NCAT. PERRL. EOMI. Conjunctivae/corneas clear, sclera Anicteric.  CVS: RRR. Normal s1 s2 no murmur, click, rub or gallop  LUNG: CTAB. Normal respiratory effort. No wheezes, rhonchi, or crackles.  ABD: Normoactive BS, soft, NT, ND, no masses or organomegaly.  EXT: No edema. No cyanosis. Full ROM.  SKIN: color, texture, turgor normal. No rashes or lesions  NEURO: Alert, oriented x 3, Spont mvt of all extremities with no focal deficits noted.      All recent labs and imaging has been reviewed.     Recent Results (from the past 24 hour(s))   POCT glucose    Collection Time: 03/04/19  5:38 PM   Result Value Ref Range    POCT Glucose 220 (H) 70 - 110 mg/dL   POCT glucose    Collection Time: 03/04/19  9:35 PM   Result Value Ref Range    POCT Glucose 125 (H) 70 - 110 mg/dL   POCT glucose    Collection Time: 03/05/19  5:28 AM   Result Value Ref Range    POCT Glucose 107 70 - 110 mg/dL   Comprehensive Metabolic Panel (CMP)    Collection Time: 03/05/19  7:13 AM   Result Value Ref Range    Sodium 138 136 - 145 mmol/L    Potassium 4.3 3.5 - 5.1 mmol/L    Chloride 96 95 - 110 mmol/L    CO2 36 (H) 23 - 29 mmol/L    Glucose 102 70 - 110 mg/dL    BUN, Bld 20 8 - 23 mg/dL    Creatinine 0.7 0.5 - 1.4 mg/dL    Calcium 9.4 8.7 - 10.5 mg/dL    Total Protein 6.3 6.0 - 8.4 g/dL    Albumin 2.0 (L) 3.5 - 5.2 g/dL    Total Bilirubin 4.4 (H) 0.1 - 1.0 mg/dL    Alkaline Phosphatase 1,618 (H) 55 - 135 U/L     (H) 10 - 40 U/L    ALT 77 (H) 10 - 44 U/L    Anion Gap 6 (L) 8 - 16 mmol/L    eGFR if African American >60.0 >60 mL/min/1.73 m^2    eGFR if non African American >60.0 >60 mL/min/1.73 m^2   CBC auto differential    Collection Time: 03/05/19  7:13 AM   Result Value Ref Range    WBC 15.00 (H) 3.90 - 12.70 K/uL    RBC 3.26 (L) 4.00 - 5.40 M/uL     Hemoglobin 10.1 (L) 12.0 - 16.0 g/dL    Hematocrit 34.9 (L) 37.0 - 48.5 %     (H) 82 - 98 fL    MCH 31.0 27.0 - 31.0 pg    MCHC 28.9 (L) 32.0 - 36.0 g/dL    RDW 20.6 (H) 11.5 - 14.5 %    Platelets 195 150 - 350 K/uL    MPV 12.0 9.2 - 12.9 fL    Immature Granulocytes 0.5 0.0 - 0.5 %    Gran # (ANC) 9.0 (H) 1.8 - 7.7 K/uL    Immature Grans (Abs) 0.08 (H) 0.00 - 0.04 K/uL    Lymph # 3.4 1.0 - 4.8 K/uL    Mono # 1.9 (H) 0.3 - 1.0 K/uL    Eos # 0.5 0.0 - 0.5 K/uL    Baso # 0.10 0.00 - 0.20 K/uL    nRBC 1 (A) 0 /100 WBC    Gran% 60.1 38.0 - 73.0 %    Lymph% 22.7 18.0 - 48.0 %    Mono% 12.8 4.0 - 15.0 %    Eosinophil% 3.2 0.0 - 8.0 %    Basophil% 0.7 0.0 - 1.9 %    Differential Method Automated    Magnesium    Collection Time: 03/05/19  7:13 AM   Result Value Ref Range    Magnesium 2.0 1.6 - 2.6 mg/dL   Phosphorus    Collection Time: 03/05/19  7:13 AM   Result Value Ref Range    Phosphorus 3.2 2.7 - 4.5 mg/dL   POCT glucose    Collection Time: 03/05/19 11:43 AM   Result Value Ref Range    POCT Glucose 148 (H) 70 - 110 mg/dL   POCT glucose    Collection Time: 03/05/19  4:07 PM   Result Value Ref Range    POCT Glucose 199 (H) 70 - 110 mg/dL       Recent Labs   Lab 03/04/19  1135 03/04/19  1738 03/04/19  2135 03/05/19  0528 03/05/19  1143 03/05/19  1607   POCTGLUCOSE 134* 220* 125* 107 148* 199*       Hemoglobin A1C   Date Value Ref Range Status   01/24/2019 4.7 4.0 - 5.6 % Final     Comment:     ADA Screening Guidelines:  5.7-6.4%  Consistent with prediabetes  >or=6.5%  Consistent with diabetes  High levels of fetal hemoglobin interfere with the HbA1C  assay. Heterozygous hemoglobin variants (HbS, HgC, etc)do  not significantly interfere with this assay.   However, presence of multiple variants may affect accuracy.     12/13/2018 5.2 4.0 - 5.6 % Final     Comment:     ADA Screening Guidelines:  5.7-6.4%  Consistent with prediabetes  >or=6.5%  Consistent with diabetes  High levels of fetal hemoglobin interfere with  the HbA1C  assay. Heterozygous hemoglobin variants (HbS, HgC, etc)do  not significantly interfere with this assay.   However, presence of multiple variants may affect accuracy.     10/04/2018 7.1 (H) 4.0 - 5.6 % Final     Comment:     ADA Screening Guidelines:  5.7-6.4%  Consistent with prediabetes  >or=6.5%  Consistent with diabetes  High levels of fetal hemoglobin interfere with the HbA1C  assay. Heterozygous hemoglobin variants (HbS, HgC, etc)do  not significantly interfere with this assay.   However, presence of multiple variants may affect accuracy.          Active Hospital Problems    Diagnosis  POA    *Acute on chronic diastolic (congestive) heart failure [I50.33]  Yes    Hypomagnesemia [E83.42]  Yes    Hypophosphatemia [E83.39]  Yes    Other specified anemias [D64.89]  Yes    Hypokalemia [E87.6]  Yes    Generalized abdominal pain [R10.84]  Yes    Bradycardia [R00.1]  No    Acute respiratory failure with hypoxia and hypercarbia [J96.01, J96.02]  No    Abnormal thyroid function test [R94.6]  Yes    Urinary incontinence without sensory awareness [N39.42]  Yes    Elevated liver enzymes [R74.8]  No    On enteral nutrition [Z78.9]  No    Alteration in skin integrity due to moisture [R23.9]  Yes    Dysphagia [R13.10]  No    Multiple skin tears [T14.8XXA]  Yes    Alteration in skin integrity related to surgical incision [R23.9]  Yes    Idioventricular rhythm [I44.2]  Yes    Depression [F32.9]  Yes    Pelvic abscess in female [N73.9]  Yes    Debility [R53.81]  Yes    Acute metabolic encephalopathy [G93.41]  Yes    Severe malnutrition [E43]  Yes    Moderate asthma without complication [J45.909]  Yes    Type 2 diabetes mellitus without complication, without long-term current use of insulin [E11.9]  Yes      Resolved Hospital Problems    Diagnosis Date Resolved POA    Acute cystitis [N30.00] 02/26/2019 Yes    Hypophosphatemia [E83.39] 02/21/2019 No    Severe sepsis [A41.9, R65.20] 02/02/2019  No    Acute renal failure superimposed on stage 3 chronic kidney disease [N17.9, N18.3] 02/24/2019 Yes    Hypomagnesemia [E83.42] 01/25/2019 Yes    Essential hypertension [I10] 02/20/2019 Yes          ASSESSMENT AND PLAN:       Acute on chronic diastolic (congestive) heart failure - resolved     Patient's mental status improved in the ICU and is now on nasal cannula oxygen and BiPAP at night.  Medicine initially consulted 2/22 for assistance with management and noted patient to be markedly fluid overloaded, estimated to be approximately net positive 16 L with this admission.  Weight had increased from 71-75kg to 87kg.   She had elevated CVP and TTE was consistent volume overload as well as her BNP of > 4900.     2/22 IV diuresis with Lasix 40 bid  with improvement in urine output.  3/2 weight down to 73kg which is near her dry weight per epic but BNP was 2600;   -3/5 switched to PO lasix as her weight is now 66kg and exam is improved.  DTR states that patient at home is prone to dehydration with lasix so it is not used daily.      Generalized abdominal pain     Due to recent Pelvic abscess in female  CT abdomen showed a 4 x 3 cm enhancing fluid collection consistent with abscess.  She also had elevated lactate of 6.8.  She was admitted to the STICU service (Dr. Laurent) for initial management. Patient taken to OR on 01/25 for ex lap with drainage of abscess.  Postoperative course complicated by septic shock secondary to MRSA bacteremia for which she received 2 weeks of IV vancomycin and briefly required pressors, which have now been weaned off.  Wound culture also grew Bacteroides and patient completed 4 weeks of Cipro and Flagyl.  Patient remained intubated postoperatively and was successfully extubated on 01/28.  Initially, she was stabilized and transferred to the floor.     On the floor, patient was making some progress and being followed by [facility]; however, a rapid response was called on 02/19 due to  somnolence.  ABG was consistent with acute hypercapnic respiratory failure, and she was placed on BiPAP and transferred back to SICU.  Infectious workup was significant for a UA with 39 WBCs, moderate bacteria, and moderate yeast; urine culture ultimately grew Candida for which she was started on Diflucan IV.    3/3 surgery looked at wounds and deferred to wound care  - continue to monitor off abx as per ID, leukocytosis stable      Debility     Mostly wheelchair bound, but able to work with [facility]  using walker  - Long-term acute care facility -abdominal wound requiring dressing, needs intensive PT, on tube feeds requiring extensive SLP.         Dysphagia     She failed speech language therapy and was started on TPN with subsequent placement of IR guided G-tube in transition to tube feeds on 02/10. Surgery replaced tube 3/2 when pt pulled it out.  - c/w tube feeds         Elevated liver enzymes     She was also found to have markedly elevated alk-phos, AST, and ALT.  Hepatology was consulted and felt acute liver injury related to infection versus medication, and not underlying liver disease. And ammonia was checked and was elevated, but hepatology recommended against treatment as they felt patient did not have hepatic encephalopathy.  Right upper quadrant ultrasound showed biliary sludge and gallbladder thickening but was negative for acute cholecystitis.  A subsequent HIDA scan was not consistent with acute cholecystitis; no surgical intervention needed per General surgery evaluation.    - re-consulted hepatology 2/27 who thought this could be drug induced.  DO not use cipro.  Recommend a trial of ursodiol 600mg BID to help with cholestasis with peak Bili of 12.  If not better in two weeks may need biopsy.   - diarrhea may be bile induced, monitor for improvement and consider cholestyramine if not better         Bradycardia - resolved     Around 2/22 pt was in junctional augusto. Cards saw pt and recommended that  rate control meds (diltiazem) be held.  Swapnil resolved.   - monitor rate       Acute respiratory failure with hypoxia and hypercarbia     Prone to hypercapnic narcosis  - wean o2  - bipap qhs and prn for AMS         Type 2 diabetes mellitus without complication, without long-term current use of insulin     -- acceptable; continue with current treatment & monitor          Moderate asthma without complication     Acute respiratory failure with hypoxia and hypercarbia  Pt prone to hypercapneic narcosis.  Steroid dependant asthma.  - c/w daily breo, theophylline, PRN duonebs  - qhs bipap and prn during the day      Hypomagnesemia     Replace prn         Severe malnutrition     - dietary consulted and appreciate recs; TF         Coronary artery disease involving native coronary artery of native heart without angina pectoris     - c/w ASA, plavix, statin  - no anginal equivalent           Prophylaxis- Hep TID    Code Status- Full Code     Discharge plan and follow up - LTAC vs SNF    Chacho Villar MD  Hospital Medicine Staff  Pager [phone]

## 2019-03-06 LAB
ALBUMIN SERPL BCP-MCNC: 2.1 G/DL
ALP SERPL-CCNC: 1476 U/L
ALT SERPL W/O P-5'-P-CCNC: 102 U/L
ANION GAP SERPL CALC-SCNC: 6 MMOL/L
AST SERPL-CCNC: 329 U/L
BASOPHILS # BLD AUTO: 0.11 K/UL
BASOPHILS NFR BLD: 0.8 %
BILIRUB SERPL-MCNC: 4.2 MG/DL
BUN SERPL-MCNC: 23 MG/DL
CALCIUM SERPL-MCNC: 9.9 MG/DL
CHLORIDE SERPL-SCNC: 97 MMOL/L
CO2 SERPL-SCNC: 35 MMOL/L
CREAT SERPL-MCNC: 0.6 MG/DL
DIFFERENTIAL METHOD: ABNORMAL
EOSINOPHIL # BLD AUTO: 0.5 K/UL
EOSINOPHIL NFR BLD: 3.8 %
ERYTHROCYTE [DISTWIDTH] IN BLOOD BY AUTOMATED COUNT: 20.3 %
EST. GFR  (AFRICAN AMERICAN): >60 ML/MIN/1.73 M^2
EST. GFR  (NON AFRICAN AMERICAN): >60 ML/MIN/1.73 M^2
GLUCOSE SERPL-MCNC: 99 MG/DL
HCT VFR BLD AUTO: 35.1 %
HGB BLD-MCNC: 10.3 G/DL
IMM GRANULOCYTES # BLD AUTO: 0.1 K/UL
IMM GRANULOCYTES NFR BLD AUTO: 0.7 %
LYMPHOCYTES # BLD AUTO: 4 K/UL
LYMPHOCYTES NFR BLD: 29.1 %
MAGNESIUM SERPL-MCNC: 2 MG/DL
MCH RBC QN AUTO: 31.4 PG
MCHC RBC AUTO-ENTMCNC: 29.3 G/DL
MCV RBC AUTO: 107 FL
MONOCYTES # BLD AUTO: 1.7 K/UL
MONOCYTES NFR BLD: 12.7 %
NEUTROPHILS # BLD AUTO: 7.3 K/UL
NEUTROPHILS NFR BLD: 52.9 %
NRBC BLD-RTO: 0 /100 WBC
PHOSPHATE SERPL-MCNC: 4.4 MG/DL
PLATELET # BLD AUTO: 206 K/UL
PMV BLD AUTO: 12.1 FL
POCT GLUCOSE: 116 MG/DL (ref 70–110)
POCT GLUCOSE: 230 MG/DL (ref 70–110)
POCT GLUCOSE: 99 MG/DL (ref 70–110)
POTASSIUM SERPL-SCNC: 4.4 MMOL/L
PROT SERPL-MCNC: 6.7 G/DL
RBC # BLD AUTO: 3.28 M/UL
SODIUM SERPL-SCNC: 138 MMOL/L
WBC # BLD AUTO: 13.71 K/UL

## 2019-03-06 PROCEDURE — 63600175 PHARM REV CODE 636 W HCPCS: Performed by: STUDENT IN AN ORGANIZED HEALTH CARE EDUCATION/TRAINING PROGRAM

## 2019-03-06 PROCEDURE — 80053 COMPREHEN METABOLIC PANEL: CPT

## 2019-03-06 PROCEDURE — 27000221 HC OXYGEN, UP TO 24 HOURS

## 2019-03-06 PROCEDURE — 92526 ORAL FUNCTION THERAPY: CPT

## 2019-03-06 PROCEDURE — 99231 SBSQ HOSP IP/OBS SF/LOW 25: CPT | Mod: ,,, | Performed by: INTERNAL MEDICINE

## 2019-03-06 PROCEDURE — 25000003 PHARM REV CODE 250: Performed by: HOSPITALIST

## 2019-03-06 PROCEDURE — 83735 ASSAY OF MAGNESIUM: CPT

## 2019-03-06 PROCEDURE — 94640 AIRWAY INHALATION TREATMENT: CPT

## 2019-03-06 PROCEDURE — 63600175 PHARM REV CODE 636 W HCPCS: Performed by: HOSPITALIST

## 2019-03-06 PROCEDURE — 94664 DEMO&/EVAL PT USE INHALER: CPT

## 2019-03-06 PROCEDURE — 94799 UNLISTED PULMONARY SVC/PX: CPT

## 2019-03-06 PROCEDURE — 85025 COMPLETE CBC W/AUTO DIFF WBC: CPT

## 2019-03-06 PROCEDURE — 84100 ASSAY OF PHOSPHORUS: CPT

## 2019-03-06 PROCEDURE — 11000001 HC ACUTE MED/SURG PRIVATE ROOM

## 2019-03-06 PROCEDURE — 94761 N-INVAS EAR/PLS OXIMETRY MLT: CPT

## 2019-03-06 PROCEDURE — 97535 SELF CARE MNGMENT TRAINING: CPT

## 2019-03-06 PROCEDURE — 25000242 PHARM REV CODE 250 ALT 637 W/ HCPCS: Performed by: HOSPITALIST

## 2019-03-06 PROCEDURE — 99900035 HC TECH TIME PER 15 MIN (STAT)

## 2019-03-06 PROCEDURE — 36415 COLL VENOUS BLD VENIPUNCTURE: CPT

## 2019-03-06 PROCEDURE — 99231 PR SUBSEQUENT HOSPITAL CARE,LEVL I: ICD-10-PCS | Mod: ,,, | Performed by: INTERNAL MEDICINE

## 2019-03-06 RX ORDER — HYDROXYZINE HYDROCHLORIDE 25 MG/1
25 TABLET, FILM COATED ORAL 3 TIMES DAILY PRN
Status: DISCONTINUED | OUTPATIENT
Start: 2019-03-06 | End: 2019-03-08

## 2019-03-06 RX ADMIN — HYDROMORPHONE HYDROCHLORIDE 0.5 MG: 1 INJECTION, SOLUTION INTRAMUSCULAR; INTRAVENOUS; SUBCUTANEOUS at 08:03

## 2019-03-06 RX ADMIN — CLOPIDOGREL 75 MG: 75 TABLET, FILM COATED ORAL at 09:03

## 2019-03-06 RX ADMIN — IPRATROPIUM BROMIDE AND ALBUTEROL SULFATE 3 ML: .5; 3 SOLUTION RESPIRATORY (INHALATION) at 07:03

## 2019-03-06 RX ADMIN — IPRATROPIUM BROMIDE AND ALBUTEROL SULFATE 3 ML: .5; 3 SOLUTION RESPIRATORY (INHALATION) at 08:03

## 2019-03-06 RX ADMIN — DIPHENHYDRAMINE HYDROCHLORIDE 12.5 MG: 50 INJECTION, SOLUTION INTRAMUSCULAR; INTRAVENOUS at 05:03

## 2019-03-06 RX ADMIN — POTASSIUM & SODIUM PHOSPHATES POWDER PACK 280-160-250 MG 1 PACKET: 280-160-250 PACK at 12:03

## 2019-03-06 RX ADMIN — URSODIOL 500 MG: 250 TABLET, FILM COATED ORAL at 09:03

## 2019-03-06 RX ADMIN — DULOXETINE 60 MG: 60 CAPSULE, DELAYED RELEASE ORAL at 09:03

## 2019-03-06 RX ADMIN — HEPARIN SODIUM 5000 UNITS: 5000 INJECTION, SOLUTION INTRAVENOUS; SUBCUTANEOUS at 01:03

## 2019-03-06 RX ADMIN — COLLAGENASE SANTYL: 250 OINTMENT TOPICAL at 09:03

## 2019-03-06 RX ADMIN — THEOPHYLLINE ANHYDROUS 300 MG: 100 CAPSULE, EXTENDED RELEASE ORAL at 09:03

## 2019-03-06 RX ADMIN — HEPARIN SODIUM 5000 UNITS: 5000 INJECTION, SOLUTION INTRAVENOUS; SUBCUTANEOUS at 05:03

## 2019-03-06 RX ADMIN — INSULIN ASPART 4 UNITS: 100 INJECTION, SOLUTION INTRAVENOUS; SUBCUTANEOUS at 04:03

## 2019-03-06 RX ADMIN — FUROSEMIDE 40 MG: 40 TABLET ORAL at 09:03

## 2019-03-06 RX ADMIN — DIPHENHYDRAMINE HYDROCHLORIDE 12.5 MG: 50 INJECTION, SOLUTION INTRAMUSCULAR; INTRAVENOUS at 08:03

## 2019-03-06 RX ADMIN — HYDROMORPHONE HYDROCHLORIDE 0.5 MG: 1 INJECTION, SOLUTION INTRAMUSCULAR; INTRAVENOUS; SUBCUTANEOUS at 05:03

## 2019-03-06 RX ADMIN — MICONAZOLE NITRATE: 20 OINTMENT TOPICAL at 08:03

## 2019-03-06 RX ADMIN — POTASSIUM & SODIUM PHOSPHATES POWDER PACK 280-160-250 MG 1 PACKET: 280-160-250 PACK at 04:03

## 2019-03-06 RX ADMIN — URSODIOL 500 MG: 250 TABLET, FILM COATED ORAL at 08:03

## 2019-03-06 RX ADMIN — HEPARIN SODIUM 5000 UNITS: 5000 INJECTION, SOLUTION INTRAVENOUS; SUBCUTANEOUS at 08:03

## 2019-03-06 RX ADMIN — PREDNISONE 10 MG: 5 TABLET ORAL at 09:03

## 2019-03-06 RX ADMIN — POTASSIUM & SODIUM PHOSPHATES POWDER PACK 280-160-250 MG 1 PACKET: 280-160-250 PACK at 08:03

## 2019-03-06 RX ADMIN — IPRATROPIUM BROMIDE AND ALBUTEROL SULFATE 3 ML: .5; 3 SOLUTION RESPIRATORY (INHALATION) at 01:03

## 2019-03-06 RX ADMIN — ASPIRIN 81 MG CHEWABLE TABLET 81 MG: 81 TABLET CHEWABLE at 09:03

## 2019-03-06 NOTE — PROGRESS NOTES
[facility] Hospital Medicine                                                                     Progress Note     Team: [facility] HOSP MED A Chacho Villar MD   Admit Date: 1/23/2019   Hospital Day: 41  TAMIKO: 3/8/2019   Code status: Full Code   Principal Problem: Acute on chronic diastolic (congestive) heart failure     SUMMARY:     Patient is a 63 y.o. with chronic diastolic heart failure, COPD on home oxygen at 2 liters, chronic debility, CAD, Type 2 diabetes, HTN, previous CVA with residual right sided weakness, prior appendectomy in 8/2018 complicated by C. Diff infection, failure to thrive admitted to the hospital on 1/23/2019 with nausea vomiting and abdominal pain found to have fluid collection in R hemipelvis. Started on vanc/zosyn. Surgery consulted, s/p ex lap, washout and pelvic abscess drainage 1/25. Post-op course complicated by septic shock secondary to MRSA bacteremia for which she received 2 weeks of IV vancomycin and briefly required pressors.  Wound culture also grew Bacteroides and patient completed 4 weeks of Cipro and Flagyl.  Patient remained intubated postoperatively and was successfully extubated on 01/28.  Initially, she was stabilized and transferred to the floor.  She failed speech language therapy and was started on TPN with subsequent placement of IR guided G-tube in transition to tube feeds on 02/10.  On the floor, patient was making some progress and being followed by [facility]; however, a rapid response was called on 02/19 due to somnolence.  ABG was consistent with acute hypercapnic respiratory failure, and she was placed on BiPAP and transferred back to SICU.  Infectious workup was significant for a UA with 39 WBCs, moderate bacteria, and moderate yeast; urine culture ultimately grew Candida for which she was started on Diflucan IV.   She was also found to have markedly elevated alk-phos, AST, and ALT.  Hepatology was consulted and felt acute liver injury related to infection versus medication, and not underlying liver disease. And ammonia was checked and was elevated, but hepatology recommended against treatment as they felt patient did not have hepatic encephalopathy.  Right upper quadrant ultrasound showed biliary sludge and gallbladder thickening but was negative for acute cholecystitis.  A subsequent HIDA scan was not consistent with acute cholecystitis; no surgical intervention needed per General surgery evaluation.  Patient's mental status improved in the ICU and is now on nasal cannula oxygen and BiPAP at night.  Medicine initially consulted for assistance with management and noted patient to be markedly fluid overloaded, estimated to be approximately net positive 16 L with this admission.  She had elevated CVP and TTE was consistent volume overload as well as her BNP of > 4900.  She was started on IV diuresis with Lasix with improvement in urine output.  She is to be stepped down to Hospital Medicine for further management and ultimately skilled nursing facility placement.    SUBJECTIVE:     No acute events overnight. Doing well overall. Aox3. Getting TF. NPO. Remains stable and afebrile.     ROS (Positive in Bold, otherwise negative)  Pain Scale: 0 /10   Constitutional: fever, chills, night sweats  CV: chest pain, edema, palpitations  Resp: SOB, cough, sputum production  GI: changes in appetite, NVDC, pain, melena, hematochezia, GERD, hematemesis  : Dysuria, hematuria, urinary urgency, frequency  MSK: arthralgia/myalgia, joint swelling  SKIN: rashes, pruritis, petechiae   Neuro/Psych: FND, anxiety, depression      OBJECTIVE:     Vitals:  Temp:  [97.8 °F (36.6 °C)-98.6 °F (37 °C)]   Pulse:  [65-95]   Resp:  [17-20]   BP: (117-136)/(59-73)   SpO2:  [95 %-100 %]      I & O (Last 24H):     Intake/Output Summary (Last 24 hours) at 3/6/2019  1524  Last data filed at 3/6/2019 1455  Gross per 24 hour   Intake 860 ml   Output 550 ml   Net 310 ml         GEN:  in no acute distress. Nontoxic. Resting in bed. Cooperative.  HEENT: NCAT. PERRL. EOMI. Conjunctivae/corneas clear, sclera Anicteric.  CVS: RRR. Normal s1 s2 no murmur, click, rub or gallop  LUNG: CTAB. Normal respiratory effort. No wheezes, rhonchi, or crackles.  ABD: Normoactive BS, soft, NT, ND, no masses or organomegaly.  EXT: No edema. No cyanosis. Full ROM.  SKIN: color, texture, turgor normal. No rashes or lesions  NEURO: Alert, oriented x 3, Spont mvt of all extremities with no focal deficits noted.      All recent labs and imaging has been reviewed.     Recent Results (from the past 24 hour(s))   POCT glucose    Collection Time: 03/05/19  4:07 PM   Result Value Ref Range    POCT Glucose 199 (H) 70 - 110 mg/dL   POCT glucose    Collection Time: 03/05/19  9:57 PM   Result Value Ref Range    POCT Glucose 108 70 - 110 mg/dL   POCT glucose    Collection Time: 03/06/19  5:45 AM   Result Value Ref Range    POCT Glucose 99 70 - 110 mg/dL   Comprehensive Metabolic Panel (CMP)    Collection Time: 03/06/19  8:28 AM   Result Value Ref Range    Sodium 138 136 - 145 mmol/L    Potassium 4.4 3.5 - 5.1 mmol/L    Chloride 97 95 - 110 mmol/L    CO2 35 (H) 23 - 29 mmol/L    Glucose 99 70 - 110 mg/dL    BUN, Bld 23 8 - 23 mg/dL    Creatinine 0.6 0.5 - 1.4 mg/dL    Calcium 9.9 8.7 - 10.5 mg/dL    Total Protein 6.7 6.0 - 8.4 g/dL    Albumin 2.1 (L) 3.5 - 5.2 g/dL    Total Bilirubin 4.2 (H) 0.1 - 1.0 mg/dL    Alkaline Phosphatase 1,476 (H) 55 - 135 U/L     (H) 10 - 40 U/L     (H) 10 - 44 U/L    Anion Gap 6 (L) 8 - 16 mmol/L    eGFR if African American >60.0 >60 mL/min/1.73 m^2    eGFR if non African American >60.0 >60 mL/min/1.73 m^2   CBC auto differential    Collection Time: 03/06/19  8:28 AM   Result Value Ref Range    WBC 13.71 (H) 3.90 - 12.70 K/uL    RBC 3.28 (L) 4.00 - 5.40 M/uL    Hemoglobin  10.3 (L) 12.0 - 16.0 g/dL    Hematocrit 35.1 (L) 37.0 - 48.5 %     (H) 82 - 98 fL    MCH 31.4 (H) 27.0 - 31.0 pg    MCHC 29.3 (L) 32.0 - 36.0 g/dL    RDW 20.3 (H) 11.5 - 14.5 %    Platelets 206 150 - 350 K/uL    MPV 12.1 9.2 - 12.9 fL    Immature Granulocytes 0.7 (H) 0.0 - 0.5 %    Gran # (ANC) 7.3 1.8 - 7.7 K/uL    Immature Grans (Abs) 0.10 (H) 0.00 - 0.04 K/uL    Lymph # 4.0 1.0 - 4.8 K/uL    Mono # 1.7 (H) 0.3 - 1.0 K/uL    Eos # 0.5 0.0 - 0.5 K/uL    Baso # 0.11 0.00 - 0.20 K/uL    nRBC 0 0 /100 WBC    Gran% 52.9 38.0 - 73.0 %    Lymph% 29.1 18.0 - 48.0 %    Mono% 12.7 4.0 - 15.0 %    Eosinophil% 3.8 0.0 - 8.0 %    Basophil% 0.8 0.0 - 1.9 %    Differential Method Automated    Magnesium    Collection Time: 03/06/19  8:28 AM   Result Value Ref Range    Magnesium 2.0 1.6 - 2.6 mg/dL   Phosphorus    Collection Time: 03/06/19  8:28 AM   Result Value Ref Range    Phosphorus 4.4 2.7 - 4.5 mg/dL   POCT glucose    Collection Time: 03/06/19 10:16 AM   Result Value Ref Range    POCT Glucose 116 (H) 70 - 110 mg/dL       Recent Labs   Lab 03/05/19  0528 03/05/19  1143 03/05/19  1607 03/05/19  2157 03/06/19  0545 03/06/19  1016   POCTGLUCOSE 107 148* 199* 108 99 116*       Hemoglobin A1C   Date Value Ref Range Status   01/24/2019 4.7 4.0 - 5.6 % Final     Comment:     ADA Screening Guidelines:  5.7-6.4%  Consistent with prediabetes  >or=6.5%  Consistent with diabetes  High levels of fetal hemoglobin interfere with the HbA1C  assay. Heterozygous hemoglobin variants (HbS, HgC, etc)do  not significantly interfere with this assay.   However, presence of multiple variants may affect accuracy.     12/13/2018 5.2 4.0 - 5.6 % Final     Comment:     ADA Screening Guidelines:  5.7-6.4%  Consistent with prediabetes  >or=6.5%  Consistent with diabetes  High levels of fetal hemoglobin interfere with the HbA1C  assay. Heterozygous hemoglobin variants (HbS, HgC, etc)do  not significantly interfere with this assay.   However, presence  of multiple variants may affect accuracy.     10/04/2018 7.1 (H) 4.0 - 5.6 % Final     Comment:     ADA Screening Guidelines:  5.7-6.4%  Consistent with prediabetes  >or=6.5%  Consistent with diabetes  High levels of fetal hemoglobin interfere with the HbA1C  assay. Heterozygous hemoglobin variants (HbS, HgC, etc)do  not significantly interfere with this assay.   However, presence of multiple variants may affect accuracy.          Active Hospital Problems    Diagnosis  POA    *Acute on chronic diastolic (congestive) heart failure [I50.33]  Yes    Hypomagnesemia [E83.42]  Yes    Hypophosphatemia [E83.39]  Yes    Other specified anemias [D64.89]  Yes    Hypokalemia [E87.6]  Yes    Generalized abdominal pain [R10.84]  Yes    Bradycardia [R00.1]  No    Acute respiratory failure with hypoxia and hypercarbia [J96.01, J96.02]  No    Abnormal thyroid function test [R94.6]  Yes    Urinary incontinence without sensory awareness [N39.42]  Yes    Elevated liver enzymes [R74.8]  No    On enteral nutrition [Z78.9]  No    Alteration in skin integrity due to moisture [R23.9]  Yes    Dysphagia [R13.10]  No    Multiple skin tears [T14.8XXA]  Yes    Alteration in skin integrity related to surgical incision [R23.9]  Yes    Idioventricular rhythm [I44.2]  Yes    Depression [F32.9]  Yes    Pelvic abscess in female [N73.9]  Yes    Debility [R53.81]  Yes    Acute metabolic encephalopathy [G93.41]  Yes    Severe malnutrition [E43]  Yes    Moderate asthma without complication [J45.909]  Yes    Type 2 diabetes mellitus without complication, without long-term current use of insulin [E11.9]  Yes      Resolved Hospital Problems    Diagnosis Date Resolved POA    Acute cystitis [N30.00] 02/26/2019 Yes    Hypophosphatemia [E83.39] 02/21/2019 No    Severe sepsis [A41.9, R65.20] 02/02/2019 No    Acute renal failure superimposed on stage 3 chronic kidney disease [N17.9, N18.3] 02/24/2019 Yes    Hypomagnesemia [E83.42]  01/25/2019 Yes    Essential hypertension [I10] 02/20/2019 Yes          ASSESSMENT AND PLAN:       Acute on chronic diastolic (congestive) heart failure - resolved     Patient's mental status improved in the ICU and is now on nasal cannula oxygen and BiPAP at night.  Medicine initially consulted 2/22 for assistance with management and noted patient to be markedly fluid overloaded, estimated to be approximately net positive 16 L with this admission.  Weight had increased from 71-75kg to 87kg.   She had elevated CVP and TTE was consistent volume overload as well as her BNP of > 4900.     2/22 IV diuresis with Lasix 40 bid  with improvement in urine output.  3/2 weight down to 73kg which is near her dry weight per epic but BNP was 2600;   -3/5 switched to PO lasix as her weight is now 66kg and exam is improved.  DTR states that patient at home is prone to dehydration with lasix so it is not used daily.      Generalized abdominal pain     Due to recent Pelvic abscess in female  CT abdomen showed a 4 x 3 cm enhancing fluid collection consistent with abscess.  She also had elevated lactate of 6.8.  She was admitted to the STICU service (Dr. Laurent) for initial management. Patient taken to OR on 01/25 for ex lap with drainage of abscess.  Postoperative course complicated by septic shock secondary to MRSA bacteremia for which she received 2 weeks of IV vancomycin and briefly required pressors, which have now been weaned off.  Wound culture also grew Bacteroides and patient completed 4 weeks of Cipro and Flagyl.  Patient remained intubated postoperatively and was successfully extubated on 01/28.  Initially, she was stabilized and transferred to the floor.     On the floor, patient was making some progress and being followed by [facility]; however, a rapid response was called on 02/19 due to somnolence.  ABG was consistent with acute hypercapnic respiratory failure, and she was placed on BiPAP and transferred back to SICU.   Infectious workup was significant for a UA with 39 WBCs, moderate bacteria, and moderate yeast; urine culture ultimately grew Candida for which she was started on Diflucan IV.    3/3 surgery looked at wounds and deferred to wound care  - continue to monitor off abx as per ID, leukocytosis stable      Debility     Mostly wheelchair bound, but able to work with [facility] using walker  - Long-term acute care facility -abdominal wound requiring dressing, needs intensive PT, on tube feeds requiring extensive SLP.         Dysphagia     She failed speech language therapy and was started on TPN with subsequent placement of IR guided G-tube in transition to tube feeds on 02/10. Surgery replaced tube 3/2 when pt pulled it out.  - c/w tube feeds  - SLP seen patient 3/6 and rec strict NPO         Elevated liver enzymes - improving      She was also found to have markedly elevated alk-phos, AST, and ALT.  Hepatology was consulted and felt acute liver injury related to infection versus medication, and not underlying liver disease. And ammonia was checked and was elevated, but hepatology recommended against treatment as they felt patient did not have hepatic encephalopathy.  Right upper quadrant ultrasound showed biliary sludge and gallbladder thickening but was negative for acute cholecystitis.  A subsequent HIDA scan was not consistent with acute cholecystitis; no surgical intervention needed per General surgery evaluation.    - re-consulted hepatology 2/27 who thought this could be drug induced.  DO not use cipro.  Recommend a trial of ursodiol 600mg BID to help with cholestasis with peak Bili of 12.  If not better in two weeks may need biopsy.   - diarrhea may be bile induced, monitor for improvement and consider cholestyramine if not better         Bradycardia - resolved     Around 2/22 pt was in junctional augusto. Cards saw pt and recommended that rate control meds (diltiazem) be held.  Augusto resolved.   - monitor rate        Acute respiratory failure with hypoxia and hypercarbia - resolved  Moderate asthma without complication     Prone to hypercapnic narcosis. Steroid dependant asthma.  - wean o2  - bipap qhs and prn for AMS   - c/w daily breo, theophylline, PRN duonebs      Type 2 diabetes mellitus without complication, without long-term current use of insulin     -- acceptable; continue with current treatment & monitor           Hypomagnesemia - resolved     Replace prn         Severe malnutrition     - dietary consulted and appreciate recs; TF         Coronary artery disease involving native coronary artery of native heart without angina pectoris     - c/w ASA, plavix, statin  - no anginal equivalent           Prophylaxis- Hep TID    Code Status- Full Code     Discharge plan and follow up - LTAC vs SNF, pending    Chacho Villar MD  Hospital Medicine Staff  Pager [phone]

## 2019-03-06 NOTE — PT/OT/SLP PROGRESS
"Speech Language Pathology Treatment    Patient Name:  Sheryl Martines   MRN:  [id number]  Admitting Diagnosis: Acute on chronic diastolic (congestive) heart failure    Recommendations:                 General Recommendations:  Dysphagia therapy, Speech language evaluation and Cognitive-linguistic evaluation  Diet recommendations:  NPO, Liquid Diet Level: NPO   Aspiration Precautions: Continue alternate means of nutrition and Strict aspiration precautions   General Precautions: Standard, aspiration, NPO, fall  Communication strategies:  provide increased time to answer and go to room if call light pushed    Subjective     Patient alert and cooperative during majority of session  Patient more interactive and oriented during this session than during prior sessions  Communicated with nurse prior to sessions    Pain/Comfort:  Pain Rating 1: 0/10  Pain Rating Post-Intervention 1: 0/10    Objective:     Has the patient been evaluated by SLP for swallowing?   Yes  Keep patient NPO? Yes   Current Respiratory Status: nasal cannula      Patient seen for continued swallow assessment and dysphagia therapy. She was sitting up in bed with her daughter in the room during session. Patient exhibited improved participation and alertness during this session than in prior. She reported being "very thirsty" and requested water at the beginning of session. SLP re-educated patient and daughter regarding results of MBSS and reason for current diet recs. Prior to PO trials, patient exhibited an improved cough and a timely volitional swallow with mildly decreased laryngeal elevation. During PO trials, she tolerated ice chips x3 and thin liquids x6 (via cup edge) with no overt signs of aspiration. Her swallow was timely, but she continued to exhibit reduced laryngeal elevation. PO trials were halted 2' to patient exhibiting wet vocal quality following last PO trial. Throughout PO trials, patient completed effortful swallows x8, pitch glides x5, " /k/ & /g/ x5 and Maria M x1 with mod assist from SLP for correct technique. SLP provided patient and daughter with list of exercises and instructions to complete them 5-10 times each at least 2-3 times per day. At this point, patient began to become visibly fatigued and SLC eval was deferred. SLP educated patient and daughter regarding POC. Patient left in bed with call light within reach and daughter in room.     Assessment:     Sheryl Martines is a 63 y.o. female with an SLP diagnosis of Dysphagia.      Goals:   Multidisciplinary Problems     SLP Goals        Problem: SLP Goal    Goal Priority Disciplines Outcome   SLP Goal     SLP Ongoing (interventions implemented as appropriate)   Description:  Speech Language Pathology Goals  Goals expected to be met by 3/7/19  1. Pt will participate in further, instrumental assessment via MBSS when appropriate.   2. Educate Pt and family on S/S aspiration and aspiration precautions   3. Pt will complete dysphagia exercises x10 ea with good effort 90% of attempts to improve BOT coordination and pharyngeal strength/coordination.   4. Pt will participate in speech/language/cognitive evaluation.                                Plan:     · Patient to be seen:  4 x/week   · Plan of Care expires:  03/21/19  · Plan of Care reviewed with:  patient, daughter   · SLP Follow-Up:  Yes       Discharge recommendations:  nursing facility, skilled   Barriers to Discharge:  Level of Skilled Assistance Needed     Time Tracking:     SLP Treatment Date:   03/06/19  Speech Start Time:  1145  Speech Stop Time:  1205     Speech Total Time (min):  20 min    Billable Minutes: Treatment Swallowing Dysfunction 10 and Self-Care/Home Management Training 10    Danny Norman -SLP  Speech-Language Pathology  Pager: [phone]   03/06/2019

## 2019-03-06 NOTE — PLAN OF CARE
Problem: Fall Injury Risk  Goal: Absence of Fall and Fall-Related Injury  Outcome: Ongoing (interventions implemented as appropriate)  Patient remains free from falls/injury throughout shift. Bed in lowest position, call light within reach     Problem: Adult Inpatient Plan of Care  Goal: Plan of Care Review  Outcome: Ongoing (interventions implemented as appropriate)  Patient AAOx3 with intermittent confusion. Vitals remain stable. Glucerna infusing continuously at goal rate of 45ml/hr; patient tolerating well; no residuals noted. Will continue to monitor.     Problem: Diabetes Comorbidity  Goal: Blood Glucose Level Within Desired Range  Outcome: Ongoing (interventions implemented as appropriate)  Patient remains free from s/s of hypoglycemia     Problem: Infection  Goal: Infection Symptom Resolution  Outcome: Ongoing (interventions implemented as appropriate)  Patient remains free from s/s of infection; remains afebrile     Problem: Wound  Goal: Optimal Wound Healing  Outcome: Ongoing (interventions implemented as appropriate)  Dressings to abdominal wall remain dry and intact     Problem: Pain Acute  Goal: Optimal Pain Control  Outcome: Ongoing (interventions implemented as appropriate)  Pain managed with prn hydromorphone this shift

## 2019-03-06 NOTE — PLAN OF CARE
Problem: SLP Goal  Goal: SLP Goal  Speech Language Pathology Goals  Goals expected to be met by 3/7/19  1. Pt will participate in further, instrumental assessment via MBSS when appropriate.   2. Educate Pt and family on S/S aspiration and aspiration precautions   3. Pt will complete dysphagia exercises x10 ea with good effort 90% of attempts to improve BOT coordination and pharyngeal strength/coordination.   4. Pt will participate in speech/language/cognitive evaluation.              Patient seen for continued swallow assessment. SLP recommending continued NPO at this time.     Danny Norman CF-SLP  Speech-Language Pathology  Pager: [phone]

## 2019-03-06 NOTE — PT/OT/SLP PROGRESS
Physical Therapy      Patient Name:  Sheryl Martines   MRN:  [id number]    Patient not seen today secondary to Other (Comment)(Pt attempted x 2 this day in PM. Pt requesting return later in day and on second attempt pt reporting pain and fatigue and requests to be seen on next day. NSG notified. ). Will follow-up per POC as appropriate.     Diogo Mirza, PTA

## 2019-03-06 NOTE — PROGRESS NOTES
"[facility]  Adult Nutrition  Progress Note    SUMMARY       Recommendations    Recommendation/Intervention:   1. Continue current TF order of Glucerna 1.5 at 45 ml/hr.  -Bolus recs: 4.5 cans Glucerna 1.5 daily to provide 1602 kcal, 88 gm protein, and 810 mL free fluid. Recommend an additional 800 ml free water daily for normal hydration needs.  2. RD following.    Goals: Patient to meet > 85% EEN and EPN  Nutrition Goal Status: goal met  Communication of RD Recs: (POC)    Reason for Assessment    Reason For Assessment: RD follow-up  Diagnosis: infection/sepsis(pelvic abscess)  Relevant Medical History: COPD, CVA, DM, HTN, HLD    General Information Comments: Pt continues on Glucerna at 45 ml/hr via PEG. PEG replaced on 3/2 after pt pulled it out. Continues with muscle/fat wasting.    Nutrition Discharge Planning: Adequate nutrition via PEG    Nutrition Risk Screen    Nutrition Risk Screen: tube feeding or parenteral nutrition    Nutrition/Diet History    Typical Food/Fluid Intake: Per MD note, patient reported poor PO itnake PTA 2/2 abdominal pain and N/V.  Spiritual, Cultural Beliefs, Hoahaoism Practices, Values that Affect Care: no  Factors Affecting Nutritional Intake: NPO    Anthropometrics    Temp: 98.4 °F (36.9 °C)  Height: 5' 2" (157.5 cm)  Height (inches): 62 in  Weight Method: Bed Scale  Weight: 62.1 kg (137 lb)  Weight (lb): 137 lb  Ideal Body Weight (IBW), Female: 110 lb  % Ideal Body Weight, Female (lb): 124.55 lb  BMI (Calculated): 25.1  BMI Grade: 25 - 29.9 - overweight  Usual Body Weight (UBW), k.6 kg(per chart review 2018)  % Usual Body Weight: 87.46  % Weight Change From Usual Weight: -12.73 %       Lab/Procedures/Meds    Pertinent Labs Reviewed: reviewed  Pertinent Labs Comments: Alk Phos 1618, Alb 2.0, T.Bili 4.4, , ALT 77  Pertinent Medications Reviewed: reviewed  Pertinent Medications Comments: lasix, pantoprazole, K-Na-phosphates, prednisone    Physical " Findings/Assessment         Estimated/Assessed Needs    Weight Used For Calorie Calculations: 71.2 kg (156 lb 15.5 oz)  Energy Calorie Requirements (kcal): 1505 kcal/day  Energy Need Method: Mayfield-St Jeor(x 1.25)  Protein Requirements:  g/day(1.2-1.4 g/kg)  Weight Used For Protein Calculations: 71.2 kg (156 lb 15.5 oz)  Fluid Requirements (mL): 1 mL/kcal or per MD  Estimated Fluid Requirement Method: RDA Method  RDA Method (mL): 1505  CHO Requirement: 50% total kcal      Nutrition Prescription Ordered    Current Diet Order: NPO  Nutrition Order Comments: 0  Current Nutrition Support Formula Ordered: Glucerna 1.5  Current Nutrition Support Rate Ordered: 45 (ml)  Current Nutrition Support Frequency Ordered: mL/hr    Evaluation of Received Nutrient/Fluid Intake    Enteral Calories (kcal): 1620  Enteral Protein (gm): 89  Enteral (Free Water) Fluid (mL): 820  % Kcal Needs: 108  % Protein Needs: 100  Energy Calories Required: meeting needs  Protein Required: meeting needs  Fluid Required: (per MD)  Comments: LBM 3/6  Tolerance: tolerating  % Intake of Estimated Energy Needs: 75 - 100 %  % Meal Intake: NPO    Nutrition Risk    Level of Risk/Frequency of Follow-up: (f/u 1 x wk)     Assessment and Plan    Severe malnutrition    Contributing Nutrition Diagnosis  Malnutrition in the context of Acute Illness/Injury    Related to (etiology):  Decreased PO intake 2/2 abdominal pain and N/V    Signs and Symptoms (as evidenced by):  Energy Intake: <75% of estimated energy requirement for several months per patient  Body Fat Depletion: mild and moderate depletion of orbitals and triceps   Muscle Mass Depletion: mild and moderate depletion of temples, clavicle region and lower extremities   Weight Loss: 12.7% x 5 months   Fluid Accumulation: mild and moderate    Interventions/Recommendations (treatment strategy):  Collaboration and Referral of Nutrition Care   Enteral Nutrition     Nutrition Diagnosis Status:  Improving -  receiving 100% of EEN/EPN via TF      Monitor and Evaluation    Food and Nutrient Intake: energy intake, enteral nutrition intake  Food and Nutrient Adminstration: enteral and parenteral nutrition administration  Anthropometric Measurements: weight, weight change  Biochemical Data, Medical Tests and Procedures: electrolyte and renal panel, gastrointestinal profile, glucose/endocrine profile, inflammatory profile  Nutrition-Focused Physical Findings: overall appearance     Malnutrition Assessment    Malnutrition Type: acute illness or injury  Orbital Region (Subcutaneous Fat Loss): mild depletion  Upper Arm Region (Subcutaneous Fat Loss): moderate depletion   Alevism Region (Muscle Loss): moderate depletion  Clavicle Bone Region (Muscle Loss): mild depletion  Anterior Thigh Region (Muscle Loss): mild depletion   Edema (Fluid Accumulation): (mild/moderate)     Nutrition Follow-Up    RD Follow-up?: Yes

## 2019-03-06 NOTE — PLAN OF CARE
ION spoke with Shanda [phone] at [facility].  [facility] does not accept her insurance and feel she is more SNF appropriate.

## 2019-03-06 NOTE — PLAN OF CARE
Problem: Diabetes Comorbidity  Goal: Blood Glucose Level Within Desired Range  Outcome: Ongoing (interventions implemented as appropriate)  Pt free from any injury or falls, VSS, turned every 2 hours to prevent further skin breakdown. Blood sugars checked every 4 hours and coverage given when needed. Wound care completed. PRN pain medication given for pain with full relief obtained. Will continue to monitor.

## 2019-03-07 LAB
ALBUMIN SERPL BCP-MCNC: 2.2 G/DL
ALP SERPL-CCNC: 1423 U/L
ALT SERPL W/O P-5'-P-CCNC: 113 U/L
ANION GAP SERPL CALC-SCNC: 8 MMOL/L
AST SERPL-CCNC: 284 U/L
BASOPHILS # BLD AUTO: 0.09 K/UL
BASOPHILS NFR BLD: 0.6 %
BILIRUB SERPL-MCNC: 3.9 MG/DL
BUN SERPL-MCNC: 25 MG/DL
CALCIUM SERPL-MCNC: 10 MG/DL
CHLORIDE SERPL-SCNC: 94 MMOL/L
CO2 SERPL-SCNC: 34 MMOL/L
CREAT SERPL-MCNC: 0.6 MG/DL
DIFFERENTIAL METHOD: ABNORMAL
EOSINOPHIL # BLD AUTO: 0.6 K/UL
EOSINOPHIL NFR BLD: 3.7 %
ERYTHROCYTE [DISTWIDTH] IN BLOOD BY AUTOMATED COUNT: 19.8 %
EST. GFR  (AFRICAN AMERICAN): >60 ML/MIN/1.73 M^2
EST. GFR  (NON AFRICAN AMERICAN): >60 ML/MIN/1.73 M^2
GLUCOSE SERPL-MCNC: 102 MG/DL
HCT VFR BLD AUTO: 35.4 %
HGB BLD-MCNC: 10.4 G/DL
IMM GRANULOCYTES # BLD AUTO: 0.09 K/UL
IMM GRANULOCYTES NFR BLD AUTO: 0.6 %
LYMPHOCYTES # BLD AUTO: 4.1 K/UL
LYMPHOCYTES NFR BLD: 27.7 %
MAGNESIUM SERPL-MCNC: 1.6 MG/DL
MCH RBC QN AUTO: 30.9 PG
MCHC RBC AUTO-ENTMCNC: 29.4 G/DL
MCV RBC AUTO: 105 FL
MONOCYTES # BLD AUTO: 1.7 K/UL
MONOCYTES NFR BLD: 11.5 %
NEUTROPHILS # BLD AUTO: 8.3 K/UL
NEUTROPHILS NFR BLD: 55.9 %
NRBC BLD-RTO: 0 /100 WBC
PHOSPHATE SERPL-MCNC: 3.4 MG/DL
PLATELET # BLD AUTO: 227 K/UL
PMV BLD AUTO: 12.5 FL
POCT GLUCOSE: 112 MG/DL (ref 70–110)
POCT GLUCOSE: 120 MG/DL (ref 70–110)
POCT GLUCOSE: 157 MG/DL (ref 70–110)
POCT GLUCOSE: 168 MG/DL (ref 70–110)
POCT GLUCOSE: 260 MG/DL (ref 70–110)
POTASSIUM SERPL-SCNC: 4.2 MMOL/L
PREALB SERPL-MCNC: 12 MG/DL
PROT SERPL-MCNC: 6.8 G/DL
RBC # BLD AUTO: 3.37 M/UL
SODIUM SERPL-SCNC: 136 MMOL/L
WBC # BLD AUTO: 14.79 K/UL

## 2019-03-07 PROCEDURE — 36415 COLL VENOUS BLD VENIPUNCTURE: CPT

## 2019-03-07 PROCEDURE — 99231 SBSQ HOSP IP/OBS SF/LOW 25: CPT | Mod: ,,, | Performed by: INTERNAL MEDICINE

## 2019-03-07 PROCEDURE — 99900035 HC TECH TIME PER 15 MIN (STAT)

## 2019-03-07 PROCEDURE — 92523 SPEECH SOUND LANG COMPREHEN: CPT

## 2019-03-07 PROCEDURE — 25000242 PHARM REV CODE 250 ALT 637 W/ HCPCS: Performed by: HOSPITALIST

## 2019-03-07 PROCEDURE — 80053 COMPREHEN METABOLIC PANEL: CPT

## 2019-03-07 PROCEDURE — 63600175 PHARM REV CODE 636 W HCPCS: Performed by: STUDENT IN AN ORGANIZED HEALTH CARE EDUCATION/TRAINING PROGRAM

## 2019-03-07 PROCEDURE — 11000001 HC ACUTE MED/SURG PRIVATE ROOM

## 2019-03-07 PROCEDURE — 92526 ORAL FUNCTION THERAPY: CPT

## 2019-03-07 PROCEDURE — 25000003 PHARM REV CODE 250: Performed by: HOSPITALIST

## 2019-03-07 PROCEDURE — 25000003 PHARM REV CODE 250: Performed by: INTERNAL MEDICINE

## 2019-03-07 PROCEDURE — 84100 ASSAY OF PHOSPHORUS: CPT

## 2019-03-07 PROCEDURE — 63600175 PHARM REV CODE 636 W HCPCS: Performed by: HOSPITALIST

## 2019-03-07 PROCEDURE — 94640 AIRWAY INHALATION TREATMENT: CPT

## 2019-03-07 PROCEDURE — 94664 DEMO&/EVAL PT USE INHALER: CPT

## 2019-03-07 PROCEDURE — 83735 ASSAY OF MAGNESIUM: CPT

## 2019-03-07 PROCEDURE — 94761 N-INVAS EAR/PLS OXIMETRY MLT: CPT

## 2019-03-07 PROCEDURE — 85025 COMPLETE CBC W/AUTO DIFF WBC: CPT

## 2019-03-07 PROCEDURE — 97535 SELF CARE MNGMENT TRAINING: CPT

## 2019-03-07 PROCEDURE — 63600175 PHARM REV CODE 636 W HCPCS: Performed by: INTERNAL MEDICINE

## 2019-03-07 PROCEDURE — 27000221 HC OXYGEN, UP TO 24 HOURS

## 2019-03-07 PROCEDURE — 84134 ASSAY OF PREALBUMIN: CPT

## 2019-03-07 PROCEDURE — 99231 PR SUBSEQUENT HOSPITAL CARE,LEVL I: ICD-10-PCS | Mod: ,,, | Performed by: INTERNAL MEDICINE

## 2019-03-07 RX ORDER — GABAPENTIN 300 MG/1
300 CAPSULE ORAL NIGHTLY
Status: DISCONTINUED | OUTPATIENT
Start: 2019-03-07 | End: 2019-03-08

## 2019-03-07 RX ORDER — FUROSEMIDE 40 MG/1
40 TABLET ORAL EVERY OTHER DAY
Status: DISCONTINUED | OUTPATIENT
Start: 2019-03-09 | End: 2019-03-08

## 2019-03-07 RX ADMIN — URSODIOL 500 MG: 250 TABLET, FILM COATED ORAL at 08:03

## 2019-03-07 RX ADMIN — PANTOPRAZOLE SODIUM 40 MG: 40 GRANULE, DELAYED RELEASE ORAL at 08:03

## 2019-03-07 RX ADMIN — HEPARIN SODIUM 5000 UNITS: 5000 INJECTION, SOLUTION INTRAVENOUS; SUBCUTANEOUS at 09:03

## 2019-03-07 RX ADMIN — FUROSEMIDE 40 MG: 40 TABLET ORAL at 08:03

## 2019-03-07 RX ADMIN — DIPHENHYDRAMINE HYDROCHLORIDE 12.5 MG: 50 INJECTION, SOLUTION INTRAMUSCULAR; INTRAVENOUS at 05:03

## 2019-03-07 RX ADMIN — HYDROMORPHONE HYDROCHLORIDE 0.5 MG: 1 INJECTION, SOLUTION INTRAMUSCULAR; INTRAVENOUS; SUBCUTANEOUS at 01:03

## 2019-03-07 RX ADMIN — IPRATROPIUM BROMIDE AND ALBUTEROL SULFATE 3 ML: .5; 3 SOLUTION RESPIRATORY (INHALATION) at 02:03

## 2019-03-07 RX ADMIN — PREDNISONE 10 MG: 5 TABLET ORAL at 08:03

## 2019-03-07 RX ADMIN — INSULIN ASPART 6 UNITS: 100 INJECTION, SOLUTION INTRAVENOUS; SUBCUTANEOUS at 03:03

## 2019-03-07 RX ADMIN — MICONAZOLE NITRATE: 20 OINTMENT TOPICAL at 08:03

## 2019-03-07 RX ADMIN — HEPARIN SODIUM 5000 UNITS: 5000 INJECTION, SOLUTION INTRAVENOUS; SUBCUTANEOUS at 05:03

## 2019-03-07 RX ADMIN — THEOPHYLLINE ANHYDROUS 300 MG: 100 CAPSULE, EXTENDED RELEASE ORAL at 08:03

## 2019-03-07 RX ADMIN — CLOPIDOGREL 75 MG: 75 TABLET, FILM COATED ORAL at 08:03

## 2019-03-07 RX ADMIN — DULOXETINE 60 MG: 60 CAPSULE, DELAYED RELEASE ORAL at 08:03

## 2019-03-07 RX ADMIN — POTASSIUM & SODIUM PHOSPHATES POWDER PACK 280-160-250 MG 1 PACKET: 280-160-250 PACK at 08:03

## 2019-03-07 RX ADMIN — HEPARIN SODIUM 5000 UNITS: 5000 INJECTION, SOLUTION INTRAVENOUS; SUBCUTANEOUS at 03:03

## 2019-03-07 RX ADMIN — MAGNESIUM SULFATE HEPTAHYDRATE 3 G: 500 INJECTION, SOLUTION INTRAMUSCULAR; INTRAVENOUS at 10:03

## 2019-03-07 RX ADMIN — IPRATROPIUM BROMIDE AND ALBUTEROL SULFATE 3 ML: .5; 3 SOLUTION RESPIRATORY (INHALATION) at 07:03

## 2019-03-07 RX ADMIN — HYDROMORPHONE HYDROCHLORIDE 0.5 MG: 1 INJECTION, SOLUTION INTRAMUSCULAR; INTRAVENOUS; SUBCUTANEOUS at 08:03

## 2019-03-07 RX ADMIN — HYDROMORPHONE HYDROCHLORIDE 0.5 MG: 1 INJECTION, SOLUTION INTRAMUSCULAR; INTRAVENOUS; SUBCUTANEOUS at 05:03

## 2019-03-07 RX ADMIN — INSULIN ASPART 2 UNITS: 100 INJECTION, SOLUTION INTRAVENOUS; SUBCUTANEOUS at 10:03

## 2019-03-07 RX ADMIN — ASPIRIN 81 MG CHEWABLE TABLET 81 MG: 81 TABLET CHEWABLE at 08:03

## 2019-03-07 RX ADMIN — MICONAZOLE NITRATE: 20 OINTMENT TOPICAL at 03:03

## 2019-03-07 RX ADMIN — COLLAGENASE SANTYL: 250 OINTMENT TOPICAL at 03:03

## 2019-03-07 RX ADMIN — GABAPENTIN 300 MG: 300 CAPSULE ORAL at 08:03

## 2019-03-07 NOTE — PLAN OF CARE
Problem: Fall Injury Risk  Goal: Absence of Fall and Fall-Related Injury  Outcome: Ongoing (interventions implemented as appropriate)  Patient remains free from falls/injury throughout shift. Bed in lowest position, call light within reach     Problem: Adult Inpatient Plan of Care  Goal: Plan of Care Review  Outcome: Ongoing (interventions implemented as appropriate)  Patient AAOx3 with intermittent confusion. Vitals remain stable. Glucerna infusing continuously at goal rate of 45ml/hr; patient tolerating well; minimal residuals noted. Will continue to monitor.     Problem: Diabetes Comorbidity  Goal: Blood Glucose Level Within Desired Range  Outcome: Ongoing (interventions implemented as appropriate)  Patient remains free from s/s of hypoglycemia     Problem: Infection  Goal: Infection Symptom Resolution  Outcome: Ongoing (interventions implemented as appropriate)  Patient remains free from s/s of infection; remains afebrile     Problem: Wound  Goal: Optimal Wound Healing  Outcome: Ongoing (interventions implemented as appropriate)  Dressings to abdominal wall remain dry and intact     Problem: Pain Acute  Goal: Optimal Pain Control  Outcome: Ongoing (interventions implemented as appropriate)  Pain managed with prn hydromorphone this shift

## 2019-03-07 NOTE — PT/OT/SLP PROGRESS
Occupational Therapy      Patient Name:  Sheryl Martines   MRN:  [id number]    Patient not seen today secondary to Bowel/bladder accident, Unavailable (Comment)(1st Attempt had to use the bedpan, 2nd attempt working w/ speech, 3rd w/ MD). Will follow-up tomorrow as able.    Jc Whitney, OT  3/7/2019

## 2019-03-07 NOTE — PT/OT/SLP EVAL
Speech Language Pathology Evaluation  Cognitive Communication    Patient Name:  Sheryl Martines   MRN:  [id number]  Admitting Diagnosis: Acute on chronic diastolic (congestive) heart failure    Recommendations:     Recommendations:                General Recommendations:  Dysphagia therapy and Cognitive-linguistic therapy  Diet recommendations:  NPO, NPO   Aspiration Precautions: HOB to 90 degrees, Ice chips sparingly, Monitor for s/s of aspiration and Strict aspiration precautions   General Precautions: Standard, aspiration, NPO, fall  Communication strategies:  go to room if call light pushed    History:     Past Medical History:   Diagnosis Date    Abnormal LFTs 12/14/2018    Closed compression fracture of fourth lumbar vertebra     Closed fracture of right tibia and fibula 10/3/2018    COPD (chronic obstructive pulmonary disease)     home O2    Coronary artery disease     Diabetes mellitus     Fall 10/4/2018    Glaucoma     High cholesterol     Hypertension     Infected incision 9/20/2018    Iritis     Pulmonary embolus     S/P appendectomy 9/11/2018    Stroke     rt sided weakness.       Past Surgical History:   Procedure Laterality Date    ABDOMINAL SURGERY      APPENDECTOMY N/A 8/26/2018    [facility]    APPENDECTOMY, LAPAROSCOPIC---CONVERTED TO OPEN APPENDECTOMY @0950 N/A 8/26/2018    [facility] at [facility] OR    APPLICATION, WOUND VAC N/A 1/25/2019    Performed by [facility] Laurent [facility] at [facility] OR 2ND FLR    BACK SURGERY      stimulator    CATARACT EXTRACTION      EXCISION, ABSCESS- drainage abdominal wall abscess N/A 1/25/2019    Performed by [facility] at [facility] OR 2ND FLR    HYSTERECTOMY      INCISION AND DRAINAGE, ABSCESS-  drainage of pelvic abscess N/A 1/25/2019    Performed by Monster Laurent MD at [facility] OR 2ND FLR    LAPAROTOMY, EXPLORATORY N/A 1/25/2019    Performed by Monster Laurent [facility] at [facility]  OR 2ND FLR    TOTAL HIP ARTHROPLASTY Left     2008     Prior Intubation HX:  1/25/19 - 1/28/19    Modified Barium Swallow 2/08/19: Patient presents with Mild Oral Dysphagia and Moderate-Severe Pharyngeal Dysphagia as characterized by prolonged A-P transfer, decreased BOT mobility, premature spillage with pooling in the valleculae, decreased hyolaryngeal excursion and elevation patterns with aspiration of thin liquids during the swallow and penetration of saliva mixed with thin and pureed residuals after the swallow. Mild-moderate naso pharyngeal reflux noted with thin liquids. Pt with sensation intact and cleared throat to clear aspirated material.  Pt with significant residuals following presentations of thin and puree trials noted to remain in valleculae post swallow, which Patient was unable to adequately clear with use of multiple, effortful swallows, chin tuck or throat clears. Pt with subsequent penetration of saliva mixed with thin and pureed residuals across atttempts to clear.  Patient also demonstrated  productive cough and orally expelled some of residuals between fluoro.  Appearance of diffuse pharyngeal edema noted to impact stripping wave force. Patient's level of pain noted to impact attention as assessment progressed and impact efficacy of compensatory strategies. Findings reviewed with Radiologist and additional trials held 2/2 risk of penetration/aspiration with saliva mixed with residuals and subsequent bites/sips.  Patient not appropriate for PO intake at this time. REC; Continue NPO with alternative means nutrition/hydration/medicaiton and ST to continue to follow for Dysphagia therapy and ongoing Patient/family training and education.     Chest X-Rays: Low lung volumes accentuating cardiomediastinal contour.  Probable small bilateral pleural effusions with adjacent atelectasis.  No pneumothorax or focal consolidation.    Occupation/hobbies/homemaking: Patient has been retired from being a  " for 20 years.    Subjective     Patient alert and cooperative for majority of session, but became lethargic as session progressed  "I feel like I'm doing everything I can, but not getting anywhere." Referring to NPO status and need for continued therapy  "I can't do those exercises when my mouth is dry." Patient referring to difficulty with swallowing exercises 2' to xerostomia  Communicated with nurse prior to session     Pain/Comfort:  · Pain Rating 1: 0/10  · Location - Side 1: Left  · Location - Orientation 1: distal  · Location 1: (butt)  · Pain Addressed 1: Reposition, Cessation of Activity  · Pain Rating Post-Intervention 1: 0/10    Objective:   Cognitive Status:    Arousal/Alertness Delayed response to stimuli as session progressed and she became visibly lethargic  Orientation Person, Place and Situation  Memory Immediate Recall: WFL, Delayed Recall: independently recalled 1/3 unrelated objects with 5-minute delay, increased to 2/3 with mod assist and Recall General Information: min assist for home address  Problem Solving Categories: 0/2 with mod assist and Compare/contrast: 1/2 with min assist      Receptive Language:   Comprehension:    Questions Complex yes/no: WFL  Commands Two Step basic commands: WFL  Complex/abstract commands: 2/3 with max assist    Pragmatics:    inconsistent eye contact throughout session    Expressive Language:  Verbal:    Automatic Speech  Sentence completion: WFL  Naming Confrontation: WFL and Divergent responsive: TBA (session ended 2' to patient lethargy)  Conversational speech no apparent communication deficits in covnersational speech      Motor Speech:  WFL    Voice:   WFL    Visual-Spatial:  TBA (session ended 2' to patient lethargy)    Reading:   TBA (session ended 2' to patient lethargy)     Written Expression:   TBA (session ended 2' to patient lethargy)    Treatment: Patient seen for continued dysphagia therapy and speech/language/cognitive assessment. " "She was sitting up in bed with daughter in room during session. Patient and daughter reported that mally had completed circuit of dysphagia exercises provided to them by SLP yesterday. Prior to PO trials, she exhibited a mildly reduced throat clear/cough as well as a mildly delayed swallow with reduced laryngeal elevation. She tolerated ice chips x5, thin liquids x3 (via cup edge) and puree x3 (1/2 tsp pudding) with no overt signs of aspiration. Her swallow was timely during PO trials, but continued to exhibit reduced laryngeal elevation. Following final presentation of puree, patient exhibited a mild decline in vocal quality. Throughout PO trials, patient completed effortful swallows x11, pitch glides x5, /k/ & /g/ and attempted Maria M x1, but was unable to complete. Patient required mod assist for correct technique and consistent cueing for participation during trials. At this point, SLP believes that patient would benefit from continued strengthening of swallow prior to initiating MBSS. SLP educated patient and daughter regarding the importance of completing these exercises independently and recommended occasional ice chips to facilitate swallowing therapy. Patient and daughter verbalized understanding and were in agreement with plan. During SLC eval, patient reported that she felt "a little slower than usual." patient became lethargic before evaluation was completed and eval should be resumed next session. Patient left in bed with call light within reach and daughter in room. POC regarding occasional ice chips with strict aspiration precautions communicated to RN.    Assessment:   Sheryl Martines is a 63 y.o. female with an SLP diagnosis of Dysphagia and Cognitive-Linguistic Impairment.     Goals:   Multidisciplinary Problems     SLP Goals        Problem: SLP Goal    Goal Priority Disciplines Outcome   SLP Goal     SLP Ongoing (interventions implemented as appropriate)   Description:  Speech Language Pathology " Goals  Goals expected to be met by 3/14/19  1. Pt will participate in further, instrumental assessment via MBSS when appropriate.   2. Educate Pt and family on S/S aspiration and aspiration precautions   3. Pt will complete dysphagia exercises x10 ea with good effort 90% of attempts to improve BOT coordination and pharyngeal strength/coordination.   4. Pt will independently complete simple problem solving tasks with 75% accuracy.  5. Pt will follow complex directions with 80% accuracy and min assist.  6. Pt will maintain alertness for entire session with min assist.  7. Pt will recall 3/4 unrelated objects with a 5-minute interval and min assist.  8. Pt will complete simple categorization tasks with 75% accuracy and min assist.  9. Pt will participate in reading/writing/visuospatial/additional cognitive-linguistic assessment in order to determine most effective POC.                                  Plan:   · Patient to be seen:  4 x/week   · Plan of Care expires:  03/21/19  · Plan of Care reviewed with:  patient, daughter   · SLP Follow-Up:  Yes       Discharge recommendations:  Discharge Facility/Level of Care Needs: nursing facility, skilled   Barriers to Discharge:  Level of Skilled Assistance Needed     Time Tracking:   SLP Treatment Date:   03/07/19  Speech Start Time:  1034  Speech Stop Time:  1105     Speech Total Time (min):  31 min    Billable Minutes: Eval 10 , Treatment Swallowing Dysfunction 11 and Self Care/Home Management Training 10    Danny Norman CF-SLP  Speech-Language Pathology  Pager: [phone]   03/07/2019

## 2019-03-07 NOTE — PLAN OF CARE
SW spoke with pt's daughter Citlaly to discuss LTAC and to obtain choices. Citlaly states that [facility] came out to evaluate patient last week and stated that they would inform of the decision this week. When SW attempted to obtain additional choices in the case that [facility] is unable to accept, however, Citlaly would not give any additional choices. SW informed Citlaly that she will be informed once an update has been obtained from [facility].     3:31 PM  SW informed daughter Citlaly that patient has been denied by [facility]. SW asked for additional choices. Daughter states that pt will get denied at every facility because pt has to pay a co-pay for the first 30 days with her insurance. SW explained that referrals still have to go out for pt's case to be reviewed. Daughter reluctantly gave [facility] and [facility] as additional choices. SW sent referrals via [facility].     Jasmyne Lei LMSW  [facility]

## 2019-03-07 NOTE — PLAN OF CARE
Problem: SLP Goal  Goal: SLP Goal  Speech Language Pathology Goals  Goals expected to be met by 3/14/19  1. Pt will participate in further, instrumental assessment via MBSS when appropriate.   2. Educate Pt and family on S/S aspiration and aspiration precautions   3. Pt will complete dysphagia exercises x10 ea with good effort 90% of attempts to improve BOT coordination and pharyngeal strength/coordination.   4. Pt will independently complete simple problem solving tasks with 75% accuracy.  5. Pt will follow complex directions with 80% accuracy and min assist.  6. Pt will maintain alertness for entire session with min assist.  7. Pt will recall 3/4 unrelated objects with a 5-minute interval and min assist.  8. Pt will complete simple categorization tasks with 75% accuracy and min assist.  9. Pt will participate in reading/writing/visuospatial/additional cognitive-linguistic assessment in order to determine most effective POC.                Patient seen for continued dysphagia therapy and speech/language/cognitive assessment. Patient appropriate for occasional ice chips (3-4 every few hours) in order to promote swallow function and facilitate dysphagia therapy. Patient should adhere to STRICT ASPIRATION PRECAUTIONS with all PO intake.     Danny Norman CF-SLP  Speech-Language Pathology  Pager: [phone]

## 2019-03-08 PROBLEM — A41.9 SEPTIC SHOCK: Status: ACTIVE | Noted: 2019-03-08

## 2019-03-08 PROBLEM — R65.21 SEPTIC SHOCK: Status: ACTIVE | Noted: 2019-03-08

## 2019-03-08 PROBLEM — J96.11 CHRONIC RESPIRATORY FAILURE WITH HYPOXIA: Status: ACTIVE | Noted: 2019-03-08

## 2019-03-08 PROBLEM — J96.12 CHRONIC RESPIRATORY FAILURE WITH HYPOXIA AND HYPERCAPNIA: Status: ACTIVE | Noted: 2019-03-08

## 2019-03-08 LAB
ALBUMIN SERPL BCP-MCNC: 2.2 G/DL
ALBUMIN SERPL BCP-MCNC: 2.4 G/DL
ALLENS TEST: ABNORMAL
ALP SERPL-CCNC: 1257 U/L
ALP SERPL-CCNC: 1343 U/L
ALT SERPL W/O P-5'-P-CCNC: 80 U/L
ALT SERPL W/O P-5'-P-CCNC: 95 U/L
AMMONIA PLAS-SCNC: 41 UMOL/L
ANION GAP SERPL CALC-SCNC: 10 MMOL/L
ANION GAP SERPL CALC-SCNC: 9 MMOL/L
ANISOCYTOSIS BLD QL SMEAR: SLIGHT
AST SERPL-CCNC: 123 U/L
AST SERPL-CCNC: 161 U/L
BACTERIA #/AREA URNS AUTO: ABNORMAL /HPF
BASO STIPL BLD QL SMEAR: ABNORMAL
BASOPHILS # BLD AUTO: 0.11 K/UL
BASOPHILS # BLD AUTO: 0.11 K/UL
BASOPHILS NFR BLD: 0.4 %
BASOPHILS NFR BLD: 0.5 %
BILIRUB SERPL-MCNC: 4.1 MG/DL
BILIRUB SERPL-MCNC: 4.2 MG/DL
BILIRUB UR QL STRIP: NEGATIVE
BNP SERPL-MCNC: 608 PG/ML
BUN SERPL-MCNC: 28 MG/DL
BUN SERPL-MCNC: 34 MG/DL
CALCIUM SERPL-MCNC: 10.4 MG/DL
CALCIUM SERPL-MCNC: 9.7 MG/DL
CHLORIDE SERPL-SCNC: 91 MMOL/L
CHLORIDE SERPL-SCNC: 92 MMOL/L
CLARITY UR REFRACT.AUTO: ABNORMAL
CO2 SERPL-SCNC: 30 MMOL/L
CO2 SERPL-SCNC: 32 MMOL/L
COLOR UR AUTO: ABNORMAL
CREAT SERPL-MCNC: 0.7 MG/DL
CREAT SERPL-MCNC: 0.8 MG/DL
DACRYOCYTES BLD QL SMEAR: ABNORMAL
DELSYS: ABNORMAL
DIFFERENTIAL METHOD: ABNORMAL
DIFFERENTIAL METHOD: ABNORMAL
EOSINOPHIL # BLD AUTO: 0.3 K/UL
EOSINOPHIL # BLD AUTO: 0.4 K/UL
EOSINOPHIL NFR BLD: 1.1 %
EOSINOPHIL NFR BLD: 1.7 %
ERYTHROCYTE [DISTWIDTH] IN BLOOD BY AUTOMATED COUNT: 18.6 %
ERYTHROCYTE [DISTWIDTH] IN BLOOD BY AUTOMATED COUNT: 18.6 %
ERYTHROCYTE [SEDIMENTATION RATE] IN BLOOD BY WESTERGREN METHOD: 24 MM/H
EST. GFR  (AFRICAN AMERICAN): >60 ML/MIN/1.73 M^2
EST. GFR  (AFRICAN AMERICAN): >60 ML/MIN/1.73 M^2
EST. GFR  (NON AFRICAN AMERICAN): >60 ML/MIN/1.73 M^2
EST. GFR  (NON AFRICAN AMERICAN): >60 ML/MIN/1.73 M^2
FIO2: 28
FLOW: 2
GLUCOSE SERPL-MCNC: 132 MG/DL
GLUCOSE SERPL-MCNC: 190 MG/DL
GLUCOSE UR QL STRIP: NEGATIVE
HCO3 UR-SCNC: 34 MMOL/L (ref 24–28)
HCT VFR BLD AUTO: 34 %
HCT VFR BLD AUTO: 38.6 %
HGB BLD-MCNC: 11.3 G/DL
HGB BLD-MCNC: 9.9 G/DL
HGB UR QL STRIP: ABNORMAL
HYALINE CASTS UR QL AUTO: 1 /LPF
IMM GRANULOCYTES # BLD AUTO: 0.13 K/UL
IMM GRANULOCYTES # BLD AUTO: 0.23 K/UL
IMM GRANULOCYTES NFR BLD AUTO: 0.6 %
IMM GRANULOCYTES NFR BLD AUTO: 0.8 %
KETONES UR QL STRIP: NEGATIVE
LDH SERPL L TO P-CCNC: 1.94 MMOL/L (ref 0.36–1.25)
LEUKOCYTE ESTERASE UR QL STRIP: ABNORMAL
LYMPHOCYTES # BLD AUTO: 3.8 K/UL
LYMPHOCYTES # BLD AUTO: 4.4 K/UL
LYMPHOCYTES NFR BLD: 13.5 %
LYMPHOCYTES NFR BLD: 20.2 %
MAGNESIUM SERPL-MCNC: 2 MG/DL
MAGNESIUM SERPL-MCNC: 2.1 MG/DL
MCH RBC QN AUTO: 31 PG
MCH RBC QN AUTO: 31.3 PG
MCHC RBC AUTO-ENTMCNC: 29.1 G/DL
MCHC RBC AUTO-ENTMCNC: 29.3 G/DL
MCV RBC AUTO: 107 FL
MCV RBC AUTO: 107 FL
MICROSCOPIC COMMENT: ABNORMAL
MODE: ABNORMAL
MONOCYTES # BLD AUTO: 1.5 K/UL
MONOCYTES # BLD AUTO: 2.4 K/UL
MONOCYTES NFR BLD: 6.9 %
MONOCYTES NFR BLD: 8.5 %
NEUTROPHILS # BLD AUTO: 15.2 K/UL
NEUTROPHILS # BLD AUTO: 21 K/UL
NEUTROPHILS NFR BLD: 70.1 %
NEUTROPHILS NFR BLD: 75.7 %
NITRITE UR QL STRIP: NEGATIVE
NRBC BLD-RTO: 0 /100 WBC
NRBC BLD-RTO: 0 /100 WBC
PCO2 BLDA: 45.9 MMHG (ref 35–45)
PH SMN: 7.48 [PH] (ref 7.35–7.45)
PH UR STRIP: 5 [PH] (ref 5–8)
PHOSPHATE SERPL-MCNC: 2.6 MG/DL
PHOSPHATE SERPL-MCNC: 2.8 MG/DL
PLATELET # BLD AUTO: 240 K/UL
PLATELET # BLD AUTO: 243 K/UL
PLATELET BLD QL SMEAR: ABNORMAL
PMV BLD AUTO: 11.8 FL
PMV BLD AUTO: 12.1 FL
PO2 BLDA: 78 MMHG (ref 80–100)
POC BE: 11 MMOL/L
POC SATURATED O2: 96 % (ref 95–100)
POC TCO2: 35 MMOL/L (ref 23–27)
POCT GLUCOSE: 191 MG/DL (ref 70–110)
POCT GLUCOSE: 210 MG/DL (ref 70–110)
POCT GLUCOSE: 253 MG/DL (ref 70–110)
POIKILOCYTOSIS BLD QL SMEAR: SLIGHT
POLYCHROMASIA BLD QL SMEAR: ABNORMAL
POTASSIUM SERPL-SCNC: 4 MMOL/L
POTASSIUM SERPL-SCNC: 4.5 MMOL/L
PROT SERPL-MCNC: 6.8 G/DL
PROT SERPL-MCNC: 7.3 G/DL
PROT UR QL STRIP: ABNORMAL
RBC # BLD AUTO: 3.19 M/UL
RBC # BLD AUTO: 3.61 M/UL
RBC #/AREA URNS AUTO: >100 /HPF (ref 0–4)
SAMPLE: ABNORMAL
SITE: ABNORMAL
SODIUM SERPL-SCNC: 131 MMOL/L
SODIUM SERPL-SCNC: 133 MMOL/L
SP GR UR STRIP: 1.02 (ref 1–1.03)
SP02: 95
SQUAMOUS #/AREA URNS AUTO: 0 /HPF
TROPONIN I SERPL DL<=0.01 NG/ML-MCNC: 0.04 NG/ML
TROPONIN I SERPL DL<=0.01 NG/ML-MCNC: 0.04 NG/ML
URN SPEC COLLECT METH UR: ABNORMAL
WBC # BLD AUTO: 21.71 K/UL
WBC # BLD AUTO: 27.75 K/UL
WBC #/AREA URNS AUTO: 10 /HPF (ref 0–5)

## 2019-03-08 PROCEDURE — 94761 N-INVAS EAR/PLS OXIMETRY MLT: CPT

## 2019-03-08 PROCEDURE — 92507 TX SP LANG VOICE COMM INDIV: CPT

## 2019-03-08 PROCEDURE — 63600175 PHARM REV CODE 636 W HCPCS: Performed by: HOSPITALIST

## 2019-03-08 PROCEDURE — 92526 ORAL FUNCTION THERAPY: CPT

## 2019-03-08 PROCEDURE — 82140 ASSAY OF AMMONIA: CPT

## 2019-03-08 PROCEDURE — 84484 ASSAY OF TROPONIN QUANT: CPT

## 2019-03-08 PROCEDURE — 93010 EKG 12-LEAD: ICD-10-PCS | Mod: ,,, | Performed by: INTERNAL MEDICINE

## 2019-03-08 PROCEDURE — 94660 CPAP INITIATION&MGMT: CPT

## 2019-03-08 PROCEDURE — 25000003 PHARM REV CODE 250: Performed by: HOSPITALIST

## 2019-03-08 PROCEDURE — 83880 ASSAY OF NATRIURETIC PEPTIDE: CPT

## 2019-03-08 PROCEDURE — 25000003 PHARM REV CODE 250: Performed by: STUDENT IN AN ORGANIZED HEALTH CARE EDUCATION/TRAINING PROGRAM

## 2019-03-08 PROCEDURE — 25000242 PHARM REV CODE 250 ALT 637 W/ HCPCS: Performed by: HOSPITALIST

## 2019-03-08 PROCEDURE — 99291 CRITICAL CARE FIRST HOUR: CPT | Mod: 25,,, | Performed by: INTERNAL MEDICINE

## 2019-03-08 PROCEDURE — 36415 COLL VENOUS BLD VENIPUNCTURE: CPT

## 2019-03-08 PROCEDURE — 80053 COMPREHEN METABOLIC PANEL: CPT

## 2019-03-08 PROCEDURE — 93010 ELECTROCARDIOGRAM REPORT: CPT | Mod: ,,, | Performed by: INTERNAL MEDICINE

## 2019-03-08 PROCEDURE — 63600175 PHARM REV CODE 636 W HCPCS: Performed by: STUDENT IN AN ORGANIZED HEALTH CARE EDUCATION/TRAINING PROGRAM

## 2019-03-08 PROCEDURE — 84100 ASSAY OF PHOSPHORUS: CPT

## 2019-03-08 PROCEDURE — 25500020 PHARM REV CODE 255: Performed by: INTERNAL MEDICINE

## 2019-03-08 PROCEDURE — 27000221 HC OXYGEN, UP TO 24 HOURS

## 2019-03-08 PROCEDURE — 99233 SBSQ HOSP IP/OBS HIGH 50: CPT | Mod: GC,,, | Performed by: HOSPITALIST

## 2019-03-08 PROCEDURE — 99233 PR SUBSEQUENT HOSPITAL CARE,LEVL III: ICD-10-PCS | Mod: GC,,, | Performed by: HOSPITALIST

## 2019-03-08 PROCEDURE — 25000242 PHARM REV CODE 250 ALT 637 W/ HCPCS: Performed by: STUDENT IN AN ORGANIZED HEALTH CARE EDUCATION/TRAINING PROGRAM

## 2019-03-08 PROCEDURE — 93005 ELECTROCARDIOGRAM TRACING: CPT

## 2019-03-08 PROCEDURE — 97535 SELF CARE MNGMENT TRAINING: CPT

## 2019-03-08 PROCEDURE — 99900035 HC TECH TIME PER 15 MIN (STAT)

## 2019-03-08 PROCEDURE — 84484 ASSAY OF TROPONIN QUANT: CPT | Mod: 91

## 2019-03-08 PROCEDURE — 99291 PR CRITICAL CARE, E/M 30-74 MINUTES: ICD-10-PCS | Mod: 25,,, | Performed by: INTERNAL MEDICINE

## 2019-03-08 PROCEDURE — 94640 AIRWAY INHALATION TREATMENT: CPT

## 2019-03-08 PROCEDURE — 83735 ASSAY OF MAGNESIUM: CPT | Mod: 91

## 2019-03-08 PROCEDURE — 80053 COMPREHEN METABOLIC PANEL: CPT | Mod: 91

## 2019-03-08 PROCEDURE — 36556 INSERT NON-TUNNEL CV CATH: CPT | Mod: ,,, | Performed by: INTERNAL MEDICINE

## 2019-03-08 PROCEDURE — 84100 ASSAY OF PHOSPHORUS: CPT | Mod: 91

## 2019-03-08 PROCEDURE — 20000000 HC ICU ROOM

## 2019-03-08 PROCEDURE — 85025 COMPLETE CBC W/AUTO DIFF WBC: CPT

## 2019-03-08 PROCEDURE — 36556 PR INSERT NON-TUNNEL CV CATH 5+ YRS OLD: ICD-10-PCS | Mod: ,,, | Performed by: INTERNAL MEDICINE

## 2019-03-08 PROCEDURE — 83735 ASSAY OF MAGNESIUM: CPT

## 2019-03-08 PROCEDURE — 87040 BLOOD CULTURE FOR BACTERIA: CPT

## 2019-03-08 PROCEDURE — 81001 URINALYSIS AUTO W/SCOPE: CPT

## 2019-03-08 PROCEDURE — 87086 URINE CULTURE/COLONY COUNT: CPT

## 2019-03-08 RX ORDER — NOREPINEPHRINE BITARTRATE/D5W 4MG/250ML
0.02 PLASTIC BAG, INJECTION (ML) INTRAVENOUS CONTINUOUS
Status: DISCONTINUED | OUTPATIENT
Start: 2019-03-08 | End: 2019-03-09

## 2019-03-08 RX ORDER — VANCOMYCIN/0.9 % SOD CHLORIDE 1 G/100 ML
1000 PLASTIC BAG, INJECTION (ML) INTRAVENOUS
Status: DISCONTINUED | OUTPATIENT
Start: 2019-03-08 | End: 2019-03-10

## 2019-03-08 RX ORDER — VANCOMYCIN HCL IN 5 % DEXTROSE 1G/250ML
15 PLASTIC BAG, INJECTION (ML) INTRAVENOUS
Status: DISCONTINUED | OUTPATIENT
Start: 2019-03-08 | End: 2019-03-08

## 2019-03-08 RX ORDER — FLUCONAZOLE 2 MG/ML
400 INJECTION, SOLUTION INTRAVENOUS
Status: DISCONTINUED | OUTPATIENT
Start: 2019-03-08 | End: 2019-03-09

## 2019-03-08 RX ORDER — THEOPHYLLINE ANHYDROUS 80 MG/15ML
100 SOLUTION ORAL EVERY 12 HOURS
Status: DISCONTINUED | OUTPATIENT
Start: 2019-03-08 | End: 2019-03-15

## 2019-03-08 RX ORDER — THEOPHYLLINE 200 MG/1
200 TABLET, EXTENDED RELEASE ORAL DAILY
Status: DISCONTINUED | OUTPATIENT
Start: 2019-03-09 | End: 2019-03-08

## 2019-03-08 RX ADMIN — IPRATROPIUM BROMIDE AND ALBUTEROL SULFATE 3 ML: .5; 3 SOLUTION RESPIRATORY (INHALATION) at 10:03

## 2019-03-08 RX ADMIN — DULOXETINE 60 MG: 60 CAPSULE, DELAYED RELEASE ORAL at 03:03

## 2019-03-08 RX ADMIN — URSODIOL 500 MG: 250 TABLET, FILM COATED ORAL at 10:03

## 2019-03-08 RX ADMIN — PANTOPRAZOLE SODIUM 40 MG: 40 GRANULE, DELAYED RELEASE ORAL at 03:03

## 2019-03-08 RX ADMIN — HYDROCORTISONE SODIUM SUCCINATE 100 MG: 100 INJECTION, POWDER, FOR SOLUTION INTRAMUSCULAR; INTRAVENOUS at 03:03

## 2019-03-08 RX ADMIN — FLUCONAZOLE 400 MG: 2 INJECTION, SOLUTION INTRAVENOUS at 05:03

## 2019-03-08 RX ADMIN — URSODIOL 500 MG: 250 TABLET, FILM COATED ORAL at 05:03

## 2019-03-08 RX ADMIN — INSULIN ASPART 6 UNITS: 100 INJECTION, SOLUTION INTRAVENOUS; SUBCUTANEOUS at 05:03

## 2019-03-08 RX ADMIN — SODIUM CHLORIDE, SODIUM LACTATE, POTASSIUM CHLORIDE, AND CALCIUM CHLORIDE 1000 ML: .6; .31; .03; .02 INJECTION, SOLUTION INTRAVENOUS at 05:03

## 2019-03-08 RX ADMIN — MICONAZOLE NITRATE: 20 OINTMENT TOPICAL at 03:03

## 2019-03-08 RX ADMIN — ASPIRIN 81 MG CHEWABLE TABLET 81 MG: 81 TABLET CHEWABLE at 03:03

## 2019-03-08 RX ADMIN — Medication 1 G: at 05:03

## 2019-03-08 RX ADMIN — INSULIN ASPART 1 UNITS: 100 INJECTION, SOLUTION INTRAVENOUS; SUBCUTANEOUS at 08:03

## 2019-03-08 RX ADMIN — FLUTICASONE FUROATE AND VILANTEROL TRIFENATATE 1 PUFF: 100; 25 POWDER RESPIRATORY (INHALATION) at 12:03

## 2019-03-08 RX ADMIN — IOHEXOL 75 ML: 350 INJECTION, SOLUTION INTRAVENOUS at 07:03

## 2019-03-08 RX ADMIN — HEPARIN SODIUM 5000 UNITS: 5000 INJECTION, SOLUTION INTRAVENOUS; SUBCUTANEOUS at 05:03

## 2019-03-08 RX ADMIN — CLOPIDOGREL 75 MG: 75 TABLET, FILM COATED ORAL at 03:03

## 2019-03-08 RX ADMIN — HYDROCORTISONE SODIUM SUCCINATE 100 MG: 100 INJECTION, POWDER, FOR SOLUTION INTRAMUSCULAR; INTRAVENOUS at 10:03

## 2019-03-08 RX ADMIN — IPRATROPIUM BROMIDE AND ALBUTEROL SULFATE 3 ML: .5; 3 SOLUTION RESPIRATORY (INHALATION) at 07:03

## 2019-03-08 RX ADMIN — HYDROMORPHONE HYDROCHLORIDE 0.5 MG: 1 INJECTION, SOLUTION INTRAMUSCULAR; INTRAVENOUS; SUBCUTANEOUS at 10:03

## 2019-03-08 RX ADMIN — HEPARIN SODIUM 5000 UNITS: 5000 INJECTION, SOLUTION INTRAVENOUS; SUBCUTANEOUS at 03:03

## 2019-03-08 RX ADMIN — HEPARIN SODIUM 5000 UNITS: 5000 INJECTION, SOLUTION INTRAVENOUS; SUBCUTANEOUS at 10:03

## 2019-03-08 RX ADMIN — PIPERACILLIN AND TAZOBACTAM 4.5 G: 4; .5 INJECTION, POWDER, LYOPHILIZED, FOR SOLUTION INTRAVENOUS; PARENTERAL at 05:03

## 2019-03-08 RX ADMIN — Medication 0.09 MCG/KG/MIN: at 01:03

## 2019-03-08 RX ADMIN — IPRATROPIUM BROMIDE AND ALBUTEROL SULFATE 3 ML: .5; 3 SOLUTION RESPIRATORY (INHALATION) at 03:03

## 2019-03-08 NOTE — ASSESSMENT & PLAN NOTE
- Pt with COPD on home oxygen 2 L NC  - ABG on 3/8: 7.478 / 45.9 / 78 / 30  - Continue oxygen supplementation  - Goal SpO2 88-92%

## 2019-03-08 NOTE — ASSESSMENT & PLAN NOTE
- Pt on Lasix 40mg per G tube every other day  - Holding diuresis in setting of shock  - TTE from 2/22/2019 showed:  · Low normal left ventricular systolic function. The estimated ejection fraction is 50%  · Grade II (moderate) left ventricular diastolic dysfunction consistent with pseudonormalization.  · Moderate mitral regurgitation.  · Low normal right ventricular systolic function.  · Mild tricuspid regurgitation.  · The estimated PA systolic pressure is 59 mm Hg. Pulmonary hypertension present.  · Elevated central venous pressure (15 mm Hg).  · Severe left atrial enlargement.

## 2019-03-08 NOTE — PLAN OF CARE
ION spoke with Shanique with [facility] to follow up on referral. ION informed by Shanique that patient has been medically accepted for LTAC placement. Shanique states that she will submit to pt's insurance today. Shanique will inform ION once insurance authorization has been obtained.     Jasmyne Lei LMSW  [facility]

## 2019-03-08 NOTE — PLAN OF CARE
Problem: Skin Injury Risk Increased  Goal: Skin Health and Integrity  Outcome: Ongoing (interventions implemented as appropriate)  Patient has new open skin area under the fold of her right side of abdomen. Barrier cream applied. Patient skin remains very sensitive to touch. Wound care provided to right abdomen wounds. Patient have some irritated skin area to right labia, and perineal area. Barrier cream applied.Peg tube site with some old dried bloody drainage area cleansed with NS drain sponge applied around peg tube site. Left hand soaked, some debris removed from under fingernails of left hand. Will require another soaking.

## 2019-03-08 NOTE — CONSULTS
[facility]  Critical Care Medicine  Consult Note    Inpatient Consult to Critical Care  Consult performed by: Arsenio Rgigs MD  Consult ordered by: Rich Stokes MD      Consult received.  Pt seen and examined.  Pt transferred to Critical Care Medicine service.  Full H&P note to follow.     Arsenio Riggs MD  Critical Care Medicine  [facility]

## 2019-03-08 NOTE — PT/OT/SLP PROGRESS
Physical Therapy      Patient Name:  Sheryl Martines   MRN:  [id number]    Attempted to visit pt twice: AM - pt found soiled upon entry, request PCT come to clean pt up; PM - pt working c respiratory.  PT unable to return later in day. Will follow-up next scheduled date.    Renato Jean, PT   3/8/2019

## 2019-03-08 NOTE — CONSULTS
EVELYN at bedside.  [facility] central line inserted by MD therefore pt.has adequate access at this time.

## 2019-03-08 NOTE — PLAN OF CARE
Problem: Adult Inpatient Plan of Care  Goal: Plan of Care Review  Outcome: Ongoing (interventions implemented as appropriate)  Patient tolerating tube feed at goal of 45cc/hr. SOB overnight, states ease of breathing increased after PRN breathing TX. No acute distress noted, turned Q2H, patient incontinent. Call light within reach. Bed locked and in lowest position. Continue to monitor.

## 2019-03-08 NOTE — PLAN OF CARE
CM received [facility] is requesting an updated MAR. CM forwarded thru [facility]. CM noted that pt needs extensive wound care on abd daily - last wound care note 3/1.      03/08/19 1129   Discharge Reassessment   Assessment Type Discharge Planning Assessment   Discharge Plan A Long-term acute care facility (LTAC)   Discharge Plan B Skilled Nursing Facility   Anticipated Discharge Disposition LTAC   Can the patient answer the patient profile reliably? No, cognitively impaired   Describe the patient's ability to walk at the present time. Major restrictions/daily assistance from another person   Post-Acute Status   Post-Acute Placement Status Additional Clinical Requested

## 2019-03-08 NOTE — PROGRESS NOTES
[facility] Hospital Medicine                                                                     Progress Note     Team: [facility] A Chacho Villar MD   Admit Date: 1/23/2019   Hospital Day: 42  TAMIKO: 3/11/2019   Code status: Full Code   Principal Problem: Acute on chronic diastolic (congestive) heart failure     SUMMARY:     Patient is a 63 y.o. with chronic diastolic heart failure, COPD on home oxygen at 2 liters, chronic debility, CAD, Type 2 diabetes, HTN, previous CVA with residual right sided weakness, prior appendectomy in 8/2018 complicated by C. Diff infection, failure to thrive admitted to the hospital on 1/23/2019 with nausea vomiting and abdominal pain found to have fluid collection in R hemipelvis. Started on vanc/zosyn. Surgery consulted, s/p ex lap, washout and pelvic abscess drainage 1/25. Post-op course complicated by septic shock secondary to MRSA bacteremia for which she received 2 weeks of IV vancomycin and briefly required pressors.  Wound culture also grew Bacteroides and patient completed 4 weeks of Cipro and Flagyl.  Patient remained intubated postoperatively and was successfully extubated on 01/28.  Initially, she was stabilized and transferred to the floor.  She failed speech language therapy and was started on TPN with subsequent placement of IR guided G-tube in transition to tube feeds on 02/10.  On the floor, patient was making some progress and being followed by [facility]; however, a rapid response was called on 02/19 due to somnolence.  ABG was consistent with acute hypercapnic respiratory failure, and she was placed on BiPAP and transferred back to SICU.  Infectious workup was significant for a UA with 39 WBCs, moderate bacteria, and moderate yeast; urine culture ultimately grew Candida for which she was started on Diflucan IV.   She was also found to have markedly elevated alk-phos, AST, and ALT.  Hepatology was consulted and felt acute liver injury related to infection versus medication, and not underlying liver disease. And ammonia was checked and was elevated, but hepatology recommended against treatment as they felt patient did not have hepatic encephalopathy.  Right upper quadrant ultrasound showed biliary sludge and gallbladder thickening but was negative for acute cholecystitis.  A subsequent HIDA scan was not consistent with acute cholecystitis; no surgical intervention needed per General surgery evaluation.  Patient's mental status improved in the ICU and is now on nasal cannula oxygen and BiPAP at night.  Medicine initially consulted for assistance with management and noted patient to be markedly fluid overloaded, estimated to be approximately net positive 16 L with this admission.  She had elevated CVP and TTE was consistent volume overload as well as her BNP of > 4900.  She was started on IV diuresis with Lasix with improvement in urine output.  She is to be stepped down to Hospital Medicine for further management and ultimately skilled nursing facility placement.    SUBJECTIVE:     No acute events overnight. Doing well overall. Aox3. Getting TF. NPO. SLP seeing patient. Remains stable and afebrile. Updated family at bedside.     ROS (Positive in Bold, otherwise negative)  Pain Scale: 0 /10   Constitutional: fever, chills, night sweats  CV: chest pain, edema, palpitations  Resp: SOB, cough, sputum production  GI: changes in appetite, NVDC, pain, melena, hematochezia, GERD, hematemesis  : Dysuria, hematuria, urinary urgency, frequency  MSK: arthralgia/myalgia, joint swelling  SKIN: rashes, pruritis, petechiae   Neuro/Psych: FND, anxiety, depression      OBJECTIVE:     Vitals:  Temp:  [98 °F (36.7 °C)-98.5 °F (36.9 °C)]   Pulse:  []   Resp:  [16-20]   BP: (104-141)/(62-81)   SpO2:  [90 %-100 %]      I & O (Last 24H):      Intake/Output Summary (Last 24 hours) at 3/7/2019 1806  Last data filed at 3/7/2019 0845  Gross per 24 hour   Intake 1236 ml   Output --   Net 1236 ml       GEN:  In no acute distress. Nontoxic. Resting in bed. Cooperative.  HEENT: NCAT. PERRL. EOMI. Conjunctivae/corneas clear, sclera Anicteric.  CVS: RRR. Normal s1 s2 no murmur, click, rub or gallop  LUNG: CTAB. Normal respiratory effort. No wheezes, rhonchi, or crackles.  ABD: Normoactive BS, soft, NT, ND, no masses or organomegaly.  EXT: No edema. No cyanosis. Full ROM.  SKIN: color, texture, turgor normal. No rashes or lesions  NEURO: Alert, oriented x 3, Spont mvt of all extremities with no focal deficits noted.      All recent labs and imaging has been reviewed.     Recent Results (from the past 24 hour(s))   POCT glucose    Collection Time: 03/06/19  8:58 PM   Result Value Ref Range    POCT Glucose 168 (H) 70 - 110 mg/dL   POCT glucose    Collection Time: 03/07/19  5:31 AM   Result Value Ref Range    POCT Glucose 120 (H) 70 - 110 mg/dL   Prealbumin    Collection Time: 03/07/19  7:10 AM   Result Value Ref Range    Prealbumin 12 (L) 20 - 43 mg/dL   Comprehensive Metabolic Panel (CMP)    Collection Time: 03/07/19  7:10 AM   Result Value Ref Range    Sodium 136 136 - 145 mmol/L    Potassium 4.2 3.5 - 5.1 mmol/L    Chloride 94 (L) 95 - 110 mmol/L    CO2 34 (H) 23 - 29 mmol/L    Glucose 102 70 - 110 mg/dL    BUN, Bld 25 (H) 8 - 23 mg/dL    Creatinine 0.6 0.5 - 1.4 mg/dL    Calcium 10.0 8.7 - 10.5 mg/dL    Total Protein 6.8 6.0 - 8.4 g/dL    Albumin 2.2 (L) 3.5 - 5.2 g/dL    Total Bilirubin 3.9 (H) 0.1 - 1.0 mg/dL    Alkaline Phosphatase 1,423 (H) 55 - 135 U/L     (H) 10 - 40 U/L     (H) 10 - 44 U/L    Anion Gap 8 8 - 16 mmol/L    eGFR if African American >60.0 >60 mL/min/1.73 m^2    eGFR if non African American >60.0 >60 mL/min/1.73 m^2   CBC auto differential    Collection Time: 03/07/19  7:10 AM   Result Value Ref Range    WBC 14.79 (H) 3.90 -  12.70 K/uL    RBC 3.37 (L) 4.00 - 5.40 M/uL    Hemoglobin 10.4 (L) 12.0 - 16.0 g/dL    Hematocrit 35.4 (L) 37.0 - 48.5 %     (H) 82 - 98 fL    MCH 30.9 27.0 - 31.0 pg    MCHC 29.4 (L) 32.0 - 36.0 g/dL    RDW 19.8 (H) 11.5 - 14.5 %    Platelets 227 150 - 350 K/uL    MPV 12.5 9.2 - 12.9 fL    Immature Granulocytes 0.6 (H) 0.0 - 0.5 %    Gran # (ANC) 8.3 (H) 1.8 - 7.7 K/uL    Immature Grans (Abs) 0.09 (H) 0.00 - 0.04 K/uL    Lymph # 4.1 1.0 - 4.8 K/uL    Mono # 1.7 (H) 0.3 - 1.0 K/uL    Eos # 0.6 (H) 0.0 - 0.5 K/uL    Baso # 0.09 0.00 - 0.20 K/uL    nRBC 0 0 /100 WBC    Gran% 55.9 38.0 - 73.0 %    Lymph% 27.7 18.0 - 48.0 %    Mono% 11.5 4.0 - 15.0 %    Eosinophil% 3.7 0.0 - 8.0 %    Basophil% 0.6 0.0 - 1.9 %    Differential Method Automated    Magnesium    Collection Time: 03/07/19  7:10 AM   Result Value Ref Range    Magnesium 1.6 1.6 - 2.6 mg/dL   Phosphorus    Collection Time: 03/07/19  7:10 AM   Result Value Ref Range    Phosphorus 3.4 2.7 - 4.5 mg/dL   POCT glucose    Collection Time: 03/07/19 10:39 AM   Result Value Ref Range    POCT Glucose 157 (H) 70 - 110 mg/dL   POCT glucose    Collection Time: 03/07/19  3:17 PM   Result Value Ref Range    POCT Glucose 260 (H) 70 - 110 mg/dL       Recent Labs   Lab 03/06/19  1016 03/06/19  1622 03/06/19  2058 03/07/19  0531 03/07/19  1039 03/07/19  1517   POCTGLUCOSE 116* 230* 168* 120* 157* 260*       Hemoglobin A1C   Date Value Ref Range Status   01/24/2019 4.7 4.0 - 5.6 % Final     Comment:     ADA Screening Guidelines:  5.7-6.4%  Consistent with prediabetes  >or=6.5%  Consistent with diabetes  High levels of fetal hemoglobin interfere with the HbA1C  assay. Heterozygous hemoglobin variants (HbS, HgC, etc)do  not significantly interfere with this assay.   However, presence of multiple variants may affect accuracy.     12/13/2018 5.2 4.0 - 5.6 % Final     Comment:     ADA Screening Guidelines:  5.7-6.4%  Consistent with prediabetes  >or=6.5%  Consistent with  diabetes  High levels of fetal hemoglobin interfere with the HbA1C  assay. Heterozygous hemoglobin variants (HbS, HgC, etc)do  not significantly interfere with this assay.   However, presence of multiple variants may affect accuracy.     10/04/2018 7.1 (H) 4.0 - 5.6 % Final     Comment:     ADA Screening Guidelines:  5.7-6.4%  Consistent with prediabetes  >or=6.5%  Consistent with diabetes  High levels of fetal hemoglobin interfere with the HbA1C  assay. Heterozygous hemoglobin variants (HbS, HgC, etc)do  not significantly interfere with this assay.   However, presence of multiple variants may affect accuracy.          Active Hospital Problems    Diagnosis  POA    *Acute on chronic diastolic (congestive) heart failure [I50.33]  Yes    Hypomagnesemia [E83.42]  Yes    Hypophosphatemia [E83.39]  Yes    Other specified anemias [D64.89]  Yes    Hypokalemia [E87.6]  Yes    Generalized abdominal pain [R10.84]  Yes    Bradycardia [R00.1]  No    Acute respiratory failure with hypoxia and hypercarbia [J96.01, J96.02]  No    Abnormal thyroid function test [R94.6]  Yes    Urinary incontinence without sensory awareness [N39.42]  Yes    Elevated liver enzymes [R74.8]  No    On enteral nutrition [Z78.9]  No    Alteration in skin integrity due to moisture [R23.9]  Yes    Dysphagia [R13.10]  No    Multiple skin tears [T14.8XXA]  Yes    Alteration in skin integrity related to surgical incision [R23.9]  Yes    Idioventricular rhythm [I44.2]  Yes    Depression [F32.9]  Yes    Pelvic abscess in female [N73.9]  Yes    Debility [R53.81]  Yes    Acute metabolic encephalopathy [G93.41]  Yes    Severe malnutrition [E43]  Yes    Moderate asthma without complication [J45.909]  Yes    Type 2 diabetes mellitus without complication, without long-term current use of insulin [E11.9]  Yes      Resolved Hospital Problems    Diagnosis Date Resolved POA    Acute cystitis [N30.00] 02/26/2019 Yes    Hypophosphatemia [E83.39]  02/21/2019 No    Severe sepsis [A41.9, R65.20] 02/02/2019 No    Acute renal failure superimposed on stage 3 chronic kidney disease [N17.9, N18.3] 02/24/2019 Yes    Hypomagnesemia [E83.42] 01/25/2019 Yes    Essential hypertension [I10] 02/20/2019 Yes          ASSESSMENT AND PLAN:       Acute on chronic diastolic (congestive) heart failure - resolved     Patient's mental status improved in the ICU and is now on nasal cannula oxygen and BiPAP at night.  Medicine initially consulted 2/22 for assistance with management and noted patient to be markedly fluid overloaded, estimated to be approximately net positive 16 L with this admission.  Weight had increased from 71-75kg to 87kg.   She had elevated CVP and TTE was consistent volume overload as well as her BNP of > 4900.     2/22 IV diuresis with Lasix 40 bid  with improvement in urine output.  3/2 weight down to 73kg which is near her dry weight per epic but BNP was 2600;   -3/5 switched to PO lasix as her weight is now 66kg and exam is improved.  DTR states that patient at home is prone to dehydration with lasix so it is not used daily.  - Lasix PO every other day for now      Generalized abdominal pain     Due to recent Pelvic abscess in female  CT abdomen showed a 4 x 3 cm enhancing fluid collection consistent with abscess.  She also had elevated lactate of 6.8.  She was admitted to the STICU service (Dr. Laurent) for initial management. Patient taken to OR on 01/25 for ex lap with drainage of abscess.  Postoperative course complicated by septic shock secondary to MRSA bacteremia for which she received 2 weeks of IV vancomycin and briefly required pressors, which have now been weaned off.  Wound culture also grew Bacteroides and patient completed 4 weeks of Cipro and Flagyl.  Patient remained intubated postoperatively and was successfully extubated on 01/28.  Initially, she was stabilized and transferred to the floor.     On the floor, patient was making some  progress and being followed by [facility]; however, a rapid response was called on 02/19 due to somnolence.  ABG was consistent with acute hypercapnic respiratory failure, and she was placed on BiPAP and transferred back to SICU.  Infectious workup was significant for a UA with 39 WBCs, moderate bacteria, and moderate yeast; urine culture ultimately grew Candida for which she was started on Diflucan IV.    3/3 surgery looked at wounds and deferred to wound care  - continue to monitor off abx as per ID, leukocytosis stable      Debility     Mostly wheelchair bound, but able to work with [facility] using walker  - Long-term acute care facility -abdominal wound requiring dressing, needs intensive PT, on tube feeds requiring extensive SLP.         Dysphagia     She failed speech language therapy and was started on TPN with subsequent placement of IR guided G-tube in transition to tube feeds on 02/10. Surgery replaced tube 3/2 when pt pulled it out.  - c/w tube feeds  - SLP seen patient 3/7 and rec strict NPO         Elevated liver enzymes - improving      She was also found to have markedly elevated alk-phos, AST, and ALT.  Hepatology was consulted and felt acute liver injury related to infection versus medication, and not underlying liver disease. And ammonia was checked and was elevated, but hepatology recommended against treatment as they felt patient did not have hepatic encephalopathy.  Right upper quadrant ultrasound showed biliary sludge and gallbladder thickening but was negative for acute cholecystitis.  A subsequent HIDA scan was not consistent with acute cholecystitis; no surgical intervention needed per General surgery evaluation.    - re-consulted hepatology 2/27 who thought this could be drug induced.  DO not use cipro.  Recommend a trial of ursodiol 600mg BID to help with cholestasis with peak Bili of 12.  If not better in two weeks may need biopsy.   - diarrhea may be bile induced, monitor for  improvement and consider cholestyramine if not better      Bradycardia - resolved     Around 2/22 pt was in junctional augusto. Cards saw pt and recommended that rate control meds (diltiazem) be held.  Augusto resolved.   - monitor rate       Acute respiratory failure with hypoxia and hypercarbia - resolved  Moderate asthma without complication     Prone to hypercapnic narcosis. Steroid dependant asthma.  - wean o2  - bipap qhs and prn for AMS   - c/w daily breo, theophylline, PRN duonebs      Type 2 diabetes mellitus without complication, without long-term current use of insulin     -- acceptable; continue with current treatment & monitor           Hypomagnesemia - resolved     Replace prn         Severe malnutrition     - dietary consulted and appreciate recs; TF         Coronary artery disease involving native coronary artery of native heart without angina pectoris     - c/w ASA, plavix, statin  - no anginal equivalent           Prophylaxis- Hep TID    Code Status- Full Code     Discharge plan and follow up - LTAC vs SNF, pending    Chacho Villar MD  Hospital Medicine Staff  Pager [phone]

## 2019-03-08 NOTE — PLAN OF CARE
Problem: SLP Goal  Goal: SLP Goal  Speech Language Pathology Goals  Goals expected to be met by 3/14/19  1. Pt will participate in further, instrumental assessment via MBSS when appropriate.   2. Educate Pt and family on S/S aspiration and aspiration precautions   3. Pt will complete dysphagia exercises x10 ea with good effort 90% of attempts to improve BOT coordination and pharyngeal strength/coordination.   4. Pt will independently complete simple problem solving tasks with 75% accuracy.  5. Pt will follow complex directions with 80% accuracy and min assist.  6. Pt will maintain alertness for entire session with min assist.  7. Pt will recall 3/4 unrelated objects with a 5-minute interval and min assist.  8. Pt will complete simple categorization tasks with 75% accuracy and min assist.  9. Pt will participate in reading/writing/visuospatial/additional cognitive-linguistic assessment in order to determine most effective POC.                 Patient seen for continued cognitive-linguistic and dysphagia therapy.     Danny Norman CF-SLP  Speech-Language Pathology  Pager: [phone]

## 2019-03-08 NOTE — PT/OT/SLP PROGRESS
Occupational Therapy      Patient Name:  Sheryl Martines   MRN:  [id number]    Patient not seen today secondary to Unavailable (Comment)(On 1st attempt pt heavily soiled with nurse, 2nd attempt with respiratory therapy). Will follow-up as able.    Annamarie Wang OT  3/8/2019

## 2019-03-08 NOTE — PLAN OF CARE
Patient arrived accompanied by RRT, primary nurse and daughter. Patient on .09 mcg/kg/min of levophed, receiving IVF bolus through midline however midline infiltrated. Levo moved to port that was accessed on floor. All other VSS.

## 2019-03-08 NOTE — ASSESSMENT & PLAN NOTE
- Continue Breo daily  - Continue theophylline q12h  - Continue duonebs q6h while awake  - Continue duonebs q4h PRN  - Holding prednisone 10mg daily while giving stress-dose steroids

## 2019-03-08 NOTE — CARE UPDATE
Rapid Response Nurse Note     Rapid Response Metrics:     Admit Date: 2019  LOS: 43  Code Status: Full Code   Date of Consult: 2019  : 1955  Age: 63 y.o.  Weight:   Wt Readings from Last 1 Encounters:   19 61.7 kg (136 lb)     Sex: female  Race: Black or    Bed: [address] [place] [address] A:   MRN: [id number]  Time Rapid Response Team page Received: 1226  Time Rapid Response Team at Bedside: 1226  Time Rapid Response Team left Bedside: 1347  Was the patient discharged from an ICU this admission?   yes  Was the patient discharged from a PACU within last 24 hours?  no  Did the patient receive conscious sedation/general anesthesia within last 24 hours?  no  Was the patient in the ED within the past 24 hours?  no  Was the patient started on NIPPV within the past 24 hours?  no  Did this progress into an ARC or CPA:  no  Attending Physician: Shorty Godinez MD  Primary Service: [facility] HOSP MED A  Consult Requested By: Shorty Godinez MD   Rapid Response Indication(s): hypotensive    SITUATION:     Reason for Call:   Called to evaluate the patient for Circulatory    BACKGROUND:     Why is the patient in the hospital?: S/P open appendectomy  What changed?: sepsis    ASSESSMENT:     What did you find: Patient hypotensive not responsive to NS bolus.  Levophed drip started at bedside.  No critical care beds available at this time, rapid RN to remain at bedside until a room becomes available. Patient's port accessed and abx initiated.  Labs and blood cultures sent to lab.       RECOMMENDATIONS:     We recommend: transfer to higher level of care    FOLLOW-UP/CONTINGENCY PLAN:         Call the rapid response Nurse at x [phone] for additional questions or concerns.      PHYSICIAN ESCALATION:     Orders received and case discussed with Dr. eD    Disposition: transferred to [place] [id number].

## 2019-03-08 NOTE — PROCEDURES
"Sheryl Martines is a 63 y.o. female patient.    Temp: 98.4 °F (36.9 °C) (03/08/19 1400)  Pulse: 99 (03/08/19 1715)  Resp: (!) 28 (03/08/19 1715)  BP: (!) 99/56 (03/08/19 1715)  SpO2: 99 % (03/08/19 1715)  Weight: 61.7 kg (136 lb) (03/07/19 0433)  Height: 5' 2" (157.5 cm) (03/06/19 0731)       Central Line  Date/Time: 3/8/2019 5:30 PM  Location procedure was performed: [facility] CRITICAL CARE MEDICINE  Performed by: Arnold Oliveros MD  Assisting provider: Arsenio Riggs MD  Pre-operative Diagnosis: shock  Post-operative diagnosis: shock  Consent Done: Yes  Time out: Immediately prior to procedure a "time out" was called to verify the correct patient, procedure, equipment, support staff and site/side marked as required.  Indications: vascular access and med administration  Anesthesia: local infiltration    Anesthesia:  Local Anesthetic: lidocaine 1% with epinephrine  Anesthetic total: 3 mL  Preparation: skin prepped with ChloraPrep  Skin prep agent dried: skin prep agent completely dried prior to procedure  Sterile barriers: all five maximum sterile barriers used - cap, mask, sterile gown, sterile gloves, and large sterile sheet  Hand hygiene: hand hygiene performed prior to central venous catheter insertion  Location details: left internal jugular (port in place on right)  Catheter type: triple lumen  Catheter Length: 19cm    Ultrasound guidance: yes  Vessel Caliber: medium, patent, compressibility normal  Needle advanced into vessel with real time Ultrasound guidance.  Guidewire confirmed in vessel.  Sterile sheath used.  Number of attempts: 1  Assessment: placement verified by x-ray,  no pneumothorax on x-ray and successful placement  Complications: none  Estimated blood loss (mL): 4  Post-procedure: line sutured          Arnold Oliveros  3/8/2019  "

## 2019-03-08 NOTE — PROGRESS NOTES
Patient seen for follow up. Upon attempting to see patient earlier in the day core call was going on and now patient is in the SICU. The nurse reports patient has recently had multiple interventions performed including her dressings changed per the MD. Orders remain in place for santyl.    Discussed with nurse and patients daughter at the bedside and she is having moisture breakdown again to the labia and perineal area. Recommend triad cream to protect and allow for moist wound healing.   There is also some breakdown beneath her pannus which appears to be from cleaning and gentle skin. Discussed and recommend no sting daily to protect.   Discussed with Dr. Oliveros and orders received. Will plan to follow up with patient again next week.   Nursing to [facility].   Gwen Castillo BS, BSN, RN, COCN, [facility]  [phone]

## 2019-03-08 NOTE — ASSESSMENT & PLAN NOTE
- Statin has been held in setting of elevated liver enzymes  - Continue DAPT for secondary prevention:  - Continue ASA 81mg daily  - Continue Plavix 75mg daily

## 2019-03-08 NOTE — PT/OT/SLP PROGRESS
"Speech Language Pathology Treatment    Patient Name:  Sheryl Martines   MRN:  [id number]  Admitting Diagnosis: Acute on chronic diastolic (congestive) heart failure    Recommendations:                 General Recommendations:  Dysphagia therapy, Speech/language therapy and Cognitive-linguistic therapy  Diet recommendations:  NPO, Liquid Diet Level: NPO   Aspiration Precautions: Continue alternate means of nutrition   General Precautions: Standard, aspiration, NPO, fall  Communication strategies:  provide increased time to answer and go to room if call light pushed    Subjective     Patient awake but more lethargic than usual during today's session  "She gets like this when she's not on CPAP overnight." Patient's daughter referring to lethargy  Communicated with nurse prior to session     Pain/Comfort:  · Pain Rating 1: 0/10  · Pain Rating Post-Intervention 1: 0/10    Objective:     Has the patient been evaluated by SLP for swallowing?   Yes  Keep patient NPO? Yes   Current Respiratory Status: nasal cannula      Patient seen for continued dysphagia therapy and cognitive-linguistic therapy. She was sitting up in bed with daughter in room during session. Patient reported that she was tired, but was agreeable to therapy. Patient exhibited a reduced throat clear/cough prior to PO trials, but her dry swallow was timely with improved (though still reduced) laryngeal elevation. She tolerated PO trials of nectar-thick liquids x3 (1/2 tsp) with no overt signs of aspiration in order to facilitate dysphagia therapy. Her swallow was timely, but still with reduced elevation. PO trials halted 2' to patient fatigue. Throughout the trials, she completed effortful swallows x7, pitch glides x5, /k/ & /g/ x5 and attempted Maria M x1 all with min assist from SLP. Patient exhibiting an improved aptitude for dysphagia exercises and was able to recall 2/4 independently. SLP also introduced chin-tuck again resistance exercises using a rolled " up towel. Patient completed isometric hold for 20 seconds and isotonic reps x10 with consistent verbal and tactile cueing from SLP. SLP educated patient and family regarding the importance of dysphagia therapy and completing exercises independently. Patient left in room with call light within reach and daughter at bedside.    Assessment:     Sheryl Martines is a 63 y.o. female with an SLP diagnosis of Dysphagia and Cognitive-Linguistic Impairment.      Goals:   Multidisciplinary Problems     SLP Goals        Problem: SLP Goal    Goal Priority Disciplines Outcome   SLP Goal     SLP Ongoing (interventions implemented as appropriate)   Description:  Speech Language Pathology Goals  Goals expected to be met by 3/14/19  1. Pt will participate in further, instrumental assessment via MBSS when appropriate.   2. Educate Pt and family on S/S aspiration and aspiration precautions   3. Pt will complete dysphagia exercises x10 ea with good effort 90% of attempts to improve BOT coordination and pharyngeal strength/coordination.   4. Pt will independently complete simple problem solving tasks with 75% accuracy.  5. Pt will follow complex directions with 80% accuracy and min assist.  6. Pt will maintain alertness for entire session with min assist.  7. Pt will recall 3/4 unrelated objects with a 5-minute interval and min assist.  8. Pt will complete simple categorization tasks with 75% accuracy and min assist.  9. Pt will participate in reading/writing/visuospatial/additional cognitive-linguistic assessment in order to determine most effective POC.                                  Plan:     · Patient to be seen:  4 x/week   · Plan of Care expires:  03/21/19  · Plan of Care reviewed with:  patient, daughter   · SLP Follow-Up:  Yes       Discharge recommendations:  nursing facility, skilled   Barriers to Discharge:  Level of Skilled Assistance Needed     Time Tracking:     SLP Treatment Date:   03/08/19  Speech Start Time:   1126  Speech Stop Time:  1142     Speech Total Time (min):  16 min    Billable Minutes: Treatment Swallowing Dysfunction 8 and Self Care/Home Management Training 8    Danny Norman CF-SLP  Speech-Language Pathology  Pager: [phone]   03/08/2019

## 2019-03-09 PROBLEM — K62.89 PROCTITIS: Status: ACTIVE | Noted: 2019-03-09

## 2019-03-09 PROBLEM — J18.9 RIGHT LOWER LOBE PNEUMONIA: Status: ACTIVE | Noted: 2019-03-09

## 2019-03-09 LAB
ALBUMIN SERPL BCP-MCNC: 2.2 G/DL
ALP SERPL-CCNC: 1077 U/L
ALT SERPL W/O P-5'-P-CCNC: 65 U/L
ANION GAP SERPL CALC-SCNC: 9 MMOL/L
ANISOCYTOSIS BLD QL SMEAR: SLIGHT
AST SERPL-CCNC: 70 U/L
BACTERIA UR CULT: NO GROWTH
BASO STIPL BLD QL SMEAR: ABNORMAL
BASOPHILS # BLD AUTO: 0.05 K/UL
BASOPHILS NFR BLD: 0.2 %
BILIRUB SERPL-MCNC: 3.9 MG/DL
BUN SERPL-MCNC: 32 MG/DL
CALCIUM SERPL-MCNC: 9.7 MG/DL
CHLORIDE SERPL-SCNC: 97 MMOL/L
CO2 SERPL-SCNC: 29 MMOL/L
CREAT SERPL-MCNC: 0.7 MG/DL
DIFFERENTIAL METHOD: ABNORMAL
EOSINOPHIL # BLD AUTO: 0 K/UL
EOSINOPHIL NFR BLD: 0 %
ERYTHROCYTE [DISTWIDTH] IN BLOOD BY AUTOMATED COUNT: 18.6 %
EST. GFR  (AFRICAN AMERICAN): >60 ML/MIN/1.73 M^2
EST. GFR  (NON AFRICAN AMERICAN): >60 ML/MIN/1.73 M^2
GLUCOSE SERPL-MCNC: 170 MG/DL
HCT VFR BLD AUTO: 32 %
HGB BLD-MCNC: 9.1 G/DL
HOWELL-JOLLY BOD BLD QL SMEAR: ABNORMAL
HYPOCHROMIA BLD QL SMEAR: ABNORMAL
IMM GRANULOCYTES # BLD AUTO: 0.15 K/UL
IMM GRANULOCYTES NFR BLD AUTO: 0.7 %
LYMPHOCYTES # BLD AUTO: 1.5 K/UL
LYMPHOCYTES NFR BLD: 6.7 %
MAGNESIUM SERPL-MCNC: 2.1 MG/DL
MCH RBC QN AUTO: 30.5 PG
MCHC RBC AUTO-ENTMCNC: 28.4 G/DL
MCV RBC AUTO: 107 FL
MONOCYTES # BLD AUTO: 0.2 K/UL
MONOCYTES NFR BLD: 0.7 %
NEUTROPHILS # BLD AUTO: 20.9 K/UL
NEUTROPHILS NFR BLD: 91.7 %
NRBC BLD-RTO: 0 /100 WBC
PAPPENHEIMER BOD BLD QL SMEAR: PRESENT
PHOSPHATE SERPL-MCNC: 4.3 MG/DL
PLATELET # BLD AUTO: 236 K/UL
PMV BLD AUTO: 11.8 FL
POCT GLUCOSE: 164 MG/DL (ref 70–110)
POCT GLUCOSE: 178 MG/DL (ref 70–110)
POCT GLUCOSE: 179 MG/DL (ref 70–110)
POCT GLUCOSE: 193 MG/DL (ref 70–110)
POCT GLUCOSE: 207 MG/DL (ref 70–110)
POCT GLUCOSE: 209 MG/DL (ref 70–110)
POIKILOCYTOSIS BLD QL SMEAR: SLIGHT
POLYCHROMASIA BLD QL SMEAR: ABNORMAL
POTASSIUM SERPL-SCNC: 4.6 MMOL/L
PROT SERPL-MCNC: 6.6 G/DL
RBC # BLD AUTO: 2.98 M/UL
SODIUM SERPL-SCNC: 135 MMOL/L
STOMATOCYTES BLD QL SMEAR: PRESENT
T3 SERPL-MCNC: 41 NG/DL
T4 FREE SERPL-MCNC: 0.86 NG/DL
TARGETS BLD QL SMEAR: ABNORMAL
TSH SERPL DL<=0.005 MIU/L-ACNC: 1.3 UIU/ML
WBC # BLD AUTO: 22.83 K/UL

## 2019-03-09 PROCEDURE — 99233 PR SUBSEQUENT HOSPITAL CARE,LEVL III: ICD-10-PCS | Mod: ,,, | Performed by: INTERNAL MEDICINE

## 2019-03-09 PROCEDURE — 85025 COMPLETE CBC W/AUTO DIFF WBC: CPT

## 2019-03-09 PROCEDURE — 63600175 PHARM REV CODE 636 W HCPCS: Performed by: STUDENT IN AN ORGANIZED HEALTH CARE EDUCATION/TRAINING PROGRAM

## 2019-03-09 PROCEDURE — 27000646 HC AEROBIKA DEVICE

## 2019-03-09 PROCEDURE — 25000003 PHARM REV CODE 250: Performed by: STUDENT IN AN ORGANIZED HEALTH CARE EDUCATION/TRAINING PROGRAM

## 2019-03-09 PROCEDURE — 25000003 PHARM REV CODE 250: Performed by: INTERNAL MEDICINE

## 2019-03-09 PROCEDURE — 94664 DEMO&/EVAL PT USE INHALER: CPT

## 2019-03-09 PROCEDURE — 94660 CPAP INITIATION&MGMT: CPT

## 2019-03-09 PROCEDURE — 25000003 PHARM REV CODE 250: Performed by: PHYSICIAN ASSISTANT

## 2019-03-09 PROCEDURE — 83735 ASSAY OF MAGNESIUM: CPT

## 2019-03-09 PROCEDURE — 25000003 PHARM REV CODE 250: Performed by: HOSPITALIST

## 2019-03-09 PROCEDURE — 84443 ASSAY THYROID STIM HORMONE: CPT

## 2019-03-09 PROCEDURE — 25000242 PHARM REV CODE 250 ALT 637 W/ HCPCS: Performed by: HOSPITALIST

## 2019-03-09 PROCEDURE — 99233 SBSQ HOSP IP/OBS HIGH 50: CPT | Mod: ,,, | Performed by: INTERNAL MEDICINE

## 2019-03-09 PROCEDURE — 99900035 HC TECH TIME PER 15 MIN (STAT)

## 2019-03-09 PROCEDURE — 63600175 PHARM REV CODE 636 W HCPCS: Performed by: HOSPITALIST

## 2019-03-09 PROCEDURE — 20600001 HC STEP DOWN PRIVATE ROOM

## 2019-03-09 PROCEDURE — 84480 ASSAY TRIIODOTHYRONINE (T3): CPT

## 2019-03-09 PROCEDURE — 36410 VNPNXR 3YR/> PHY/QHP DX/THER: CPT

## 2019-03-09 PROCEDURE — 76937 US GUIDE VASCULAR ACCESS: CPT

## 2019-03-09 PROCEDURE — 84100 ASSAY OF PHOSPHORUS: CPT

## 2019-03-09 PROCEDURE — 27000221 HC OXYGEN, UP TO 24 HOURS

## 2019-03-09 PROCEDURE — 63600175 PHARM REV CODE 636 W HCPCS: Performed by: PHYSICIAN ASSISTANT

## 2019-03-09 PROCEDURE — 94640 AIRWAY INHALATION TREATMENT: CPT

## 2019-03-09 PROCEDURE — 94761 N-INVAS EAR/PLS OXIMETRY MLT: CPT

## 2019-03-09 PROCEDURE — 80053 COMPREHEN METABOLIC PANEL: CPT

## 2019-03-09 PROCEDURE — C1751 CATH, INF, PER/CENT/MIDLINE: HCPCS

## 2019-03-09 PROCEDURE — 84439 ASSAY OF FREE THYROXINE: CPT

## 2019-03-09 RX ORDER — LIDOCAINE 50 MG/G
OINTMENT TOPICAL EVERY 6 HOURS PRN
Status: DISCONTINUED | OUTPATIENT
Start: 2019-03-09 | End: 2019-03-16 | Stop reason: HOSPADM

## 2019-03-09 RX ORDER — KETOROLAC TROMETHAMINE 15 MG/ML
15 INJECTION, SOLUTION INTRAMUSCULAR; INTRAVENOUS ONCE AS NEEDED
Status: COMPLETED | OUTPATIENT
Start: 2019-03-09 | End: 2019-03-09

## 2019-03-09 RX ORDER — ACETAMINOPHEN 325 MG/1
650 TABLET ORAL EVERY 6 HOURS PRN
Status: DISCONTINUED | OUTPATIENT
Start: 2019-03-09 | End: 2019-03-16 | Stop reason: HOSPADM

## 2019-03-09 RX ORDER — GABAPENTIN 300 MG/1
300 CAPSULE ORAL NIGHTLY
Status: DISCONTINUED | OUTPATIENT
Start: 2019-03-09 | End: 2019-03-16 | Stop reason: HOSPADM

## 2019-03-09 RX ORDER — IPRATROPIUM BROMIDE AND ALBUTEROL SULFATE 2.5; .5 MG/3ML; MG/3ML
3 SOLUTION RESPIRATORY (INHALATION)
Status: DISCONTINUED | OUTPATIENT
Start: 2019-03-09 | End: 2019-03-16 | Stop reason: HOSPADM

## 2019-03-09 RX ORDER — HYDRALAZINE HYDROCHLORIDE 20 MG/ML
10 INJECTION INTRAMUSCULAR; INTRAVENOUS EVERY 6 HOURS PRN
Status: DISCONTINUED | OUTPATIENT
Start: 2019-03-09 | End: 2019-03-09

## 2019-03-09 RX ADMIN — HEPARIN SODIUM 5000 UNITS: 5000 INJECTION, SOLUTION INTRAVENOUS; SUBCUTANEOUS at 02:03

## 2019-03-09 RX ADMIN — URSODIOL 500 MG: 250 TABLET, FILM COATED ORAL at 09:03

## 2019-03-09 RX ADMIN — IPRATROPIUM BROMIDE AND ALBUTEROL SULFATE 3 ML: .5; 3 SOLUTION RESPIRATORY (INHALATION) at 01:03

## 2019-03-09 RX ADMIN — INSULIN ASPART 1 UNITS: 100 INJECTION, SOLUTION INTRAVENOUS; SUBCUTANEOUS at 12:03

## 2019-03-09 RX ADMIN — ACETAMINOPHEN 650 MG: 325 TABLET ORAL at 12:03

## 2019-03-09 RX ADMIN — PIPERACILLIN AND TAZOBACTAM 4.5 G: 4; .5 INJECTION, POWDER, LYOPHILIZED, FOR SOLUTION INTRAVENOUS; PARENTERAL at 12:03

## 2019-03-09 RX ADMIN — THEOPHYLLINE 100.27 MG: 80 SOLUTION ORAL at 12:03

## 2019-03-09 RX ADMIN — PIPERACILLIN AND TAZOBACTAM 4.5 G: 4; .5 INJECTION, POWDER, LYOPHILIZED, FOR SOLUTION INTRAVENOUS; PARENTERAL at 06:03

## 2019-03-09 RX ADMIN — LIDOCAINE: 50 OINTMENT TOPICAL at 11:03

## 2019-03-09 RX ADMIN — FLUTICASONE FUROATE AND VILANTEROL TRIFENATATE 1 PUFF: 100; 25 POWDER RESPIRATORY (INHALATION) at 01:03

## 2019-03-09 RX ADMIN — HYDROCORTISONE SODIUM SUCCINATE 100 MG: 100 INJECTION, POWDER, FOR SOLUTION INTRAMUSCULAR; INTRAVENOUS at 06:03

## 2019-03-09 RX ADMIN — DOCUSATE SODIUM 50 MG: 50 CAPSULE, LIQUID FILLED ORAL at 12:03

## 2019-03-09 RX ADMIN — KETOROLAC TROMETHAMINE 15 MG: 15 INJECTION, SOLUTION INTRAMUSCULAR; INTRAVENOUS at 11:03

## 2019-03-09 RX ADMIN — HYDROCORTISONE SODIUM SUCCINATE 50 MG: 100 INJECTION, POWDER, FOR SOLUTION INTRAMUSCULAR; INTRAVENOUS at 03:03

## 2019-03-09 RX ADMIN — HYDROCORTISONE SODIUM SUCCINATE 50 MG: 100 INJECTION, POWDER, FOR SOLUTION INTRAMUSCULAR; INTRAVENOUS at 09:03

## 2019-03-09 RX ADMIN — THEOPHYLLINE 100.27 MG: 80 SOLUTION ORAL at 09:03

## 2019-03-09 RX ADMIN — CLOPIDOGREL 75 MG: 75 TABLET, FILM COATED ORAL at 08:03

## 2019-03-09 RX ADMIN — COLLAGENASE SANTYL: 250 OINTMENT TOPICAL at 09:03

## 2019-03-09 RX ADMIN — PANTOPRAZOLE SODIUM 40 MG: 40 GRANULE, DELAYED RELEASE ORAL at 08:03

## 2019-03-09 RX ADMIN — ACETAMINOPHEN 650 MG: 325 TABLET ORAL at 07:03

## 2019-03-09 RX ADMIN — IPRATROPIUM BROMIDE AND ALBUTEROL SULFATE 3 ML: .5; 3 SOLUTION RESPIRATORY (INHALATION) at 08:03

## 2019-03-09 RX ADMIN — INSULIN ASPART 1 UNITS: 100 INJECTION, SOLUTION INTRAVENOUS; SUBCUTANEOUS at 10:03

## 2019-03-09 RX ADMIN — INSULIN ASPART 2 UNITS: 100 INJECTION, SOLUTION INTRAVENOUS; SUBCUTANEOUS at 10:03

## 2019-03-09 RX ADMIN — DULOXETINE 60 MG: 60 CAPSULE, DELAYED RELEASE ORAL at 08:03

## 2019-03-09 RX ADMIN — ASPIRIN 81 MG CHEWABLE TABLET 81 MG: 81 TABLET CHEWABLE at 08:03

## 2019-03-09 RX ADMIN — PIPERACILLIN AND TAZOBACTAM 4.5 G: 4; .5 INJECTION, POWDER, LYOPHILIZED, FOR SOLUTION INTRAVENOUS; PARENTERAL at 09:03

## 2019-03-09 RX ADMIN — HEPARIN SODIUM 5000 UNITS: 5000 INJECTION, SOLUTION INTRAVENOUS; SUBCUTANEOUS at 06:03

## 2019-03-09 RX ADMIN — Medication 1 G: at 05:03

## 2019-03-09 RX ADMIN — Medication 1 G: at 09:03

## 2019-03-09 RX ADMIN — GABAPENTIN 300 MG: 300 CAPSULE ORAL at 09:03

## 2019-03-09 RX ADMIN — HEPARIN SODIUM 5000 UNITS: 5000 INJECTION, SOLUTION INTRAVENOUS; SUBCUTANEOUS at 09:03

## 2019-03-09 RX ADMIN — INSULIN ASPART 2 UNITS: 100 INJECTION, SOLUTION INTRAVENOUS; SUBCUTANEOUS at 03:03

## 2019-03-09 RX ADMIN — HYDRALAZINE HYDROCHLORIDE 10 MG: 20 INJECTION INTRAMUSCULAR; INTRAVENOUS at 09:03

## 2019-03-09 NOTE — ASSESSMENT & PLAN NOTE
- TSH 4.718 / fT4 0.70 on 2/19  - TSH 1.305 / fT4 0.86 / T3 41 on 3/9, consistent with euthyroid hypothyroxinema

## 2019-03-09 NOTE — HOSPITAL COURSE
Per Hospital Medicine HPI:  Patient is a 63 y.o. with chronic diastolic heart failure, COPD on home oxygen at 2 liters, chronic debility, CAD, Type 2 diabetes, HTN, previous CVA with residual right sided weakness, prior appendectomy in 8/2018 complicated by C. Diff infection, failure to thrive admitted to the hospital on 1/23/2019 with nausea vomiting and abdominal pain found to have fluid collection in R hemipelvis. Started on vanc/zosyn. Surgery consulted, s/p ex lap, washout and pelvic abscess drainage 1/25. Post-op course complicated by septic shock secondary to MRSA bacteremia for which she received 2 weeks of IV vancomycin and briefly required pressors.  Wound culture also grew Bacteroides and patient completed 4 weeks of Cipro and Flagyl.  Patient remained intubated postoperatively and was successfully extubated on 01/28.  Initially, she was stabilized and transferred to the floor.  She failed speech language therapy and was started on TPN with subsequent placement of IR guided G-tube in transition to tube feeds on 02/10.  On the floor, patient was making some progress and being followed by [facility]; however, a rapid response was called on 02/19 due to somnolence.  ABG was consistent with acute hypercapnic respiratory failure, and she was placed on BiPAP and transferred back to SICU.  Infectious workup was significant for a UA with 39 WBCs, moderate bacteria, and moderate yeast; urine culture ultimately grew Candida for which she was started on Diflucan IV.  She was also found to have markedly elevated alk-phos, AST, and ALT.  Hepatology was consulted and felt acute liver injury related to infection versus medication, and not underlying liver disease. And ammonia was checked and was elevated, but hepatology recommended against treatment as they felt patient did not have hepatic encephalopathy.  Right upper quadrant ultrasound showed biliary sludge and gallbladder thickening but was negative for acute  cholecystitis.  A subsequent HIDA scan was not consistent with acute cholecystitis; no surgical intervention needed per General surgery evaluation.  Patient's mental status improved in the ICU and is now on nasal cannula oxygen and BiPAP at night.  Medicine initially consulted for assistance with management and noted patient to be markedly fluid overloaded, estimated to be approximately net positive 16 L with this admission.  She had elevated CVP and TTE was consistent volume overload as well as her BNP of > 4900.  She was started on IV diuresis with Lasix with improvement in urine output.  She is to be stepped down to Hospital Medicine for further management and ultimately skilled nursing facility placement.    MICU Course:  Pt stepped up to ICU on 3/8.  She was started on Levophed and stress-dose steroids for hypotension in the setting of likely septic shock and broad-spectrum antimicrobial coverage with vancomycin, Zosyn, and fluconazole.  CT abdomen/pelvis with contrast obtained due to history of intra-abdominal abscess, which showed RLL pneumonia and proctitis.  Pt weaned off of vasopressors on 3/9.

## 2019-03-09 NOTE — PROGRESS NOTES
[facility]  Critical Care Medicine  Progress Note    Patient Name: Sheryl Martines  MRN: [id number]  Admission Date: 1/23/2019  Hospital Length of Stay: 44 days  Code Status: Full Code  Attending Provider: Shorty Godinez MD  Primary Care Provider: Primary Doctor No   Principal Problem: Septic shock    Subjective:     HPI:  Sheryl Martines is a 63 y.o. Female with HFpEF, COPD, CAD, T2DM, previous CVA with right-sided residual deficits, s/p appendectomy in 8/2018 c/b intra-abdominal abscess (s/p ex lap 1/25/19).  Her post-operative course this admission has been complicated by septic shock secondary to MRSA bacteremia for which she received 2 weeks of IV vancomycin and briefly required vasopressors, as well as respiratory failure requiring mechanical ventilation.  Wound culture also grew Bacteroides and patient completed 4 weeks of ciprofloxacin and Flagyl earlier this admission.    On the morning of 3/8, pt noted to be hypotensive (BP 68/44) and tachycardic (), with Tmax 100 F.  Rapid Response called and Critical Care consulted.  Pt started on Levophed and broad-spectrum antibiotics and transferred to the ICU.    Hospital/ICU Course:  Per Hospital Medicine HPI:  Patient is a 63 y.o. with chronic diastolic heart failure, COPD on home oxygen at 2 liters, chronic debility, CAD, Type 2 diabetes, HTN, previous CVA with residual right sided weakness, prior appendectomy in 8/2018 complicated by C. Diff infection, failure to thrive admitted to the hospital on 1/23/2019 with nausea vomiting and abdominal pain found to have fluid collection in R hemipelvis. Started on vanc/zosyn. Surgery consulted, s/p ex lap, washout and pelvic abscess drainage 1/25. Post-op course complicated by septic shock secondary to MRSA bacteremia for which she received 2 weeks of IV vancomycin and briefly required pressors.  Wound culture also grew Bacteroides and patient completed 4 weeks of Cipro and Flagyl.  Patient  remained intubated postoperatively and was successfully extubated on 01/28.  Initially, she was stabilized and transferred to the floor.  She failed speech language therapy and was started on TPN with subsequent placement of IR guided G-tube in transition to tube feeds on 02/10.  On the floor, patient was making some progress and being followed by [facility]; however, a rapid response was called on 02/19 due to somnolence.  ABG was consistent with acute hypercapnic respiratory failure, and she was placed on BiPAP and transferred back to SICU.  Infectious workup was significant for a UA with 39 WBCs, moderate bacteria, and moderate yeast; urine culture ultimately grew Candida for which she was started on Diflucan IV.  She was also found to have markedly elevated alk-phos, AST, and ALT.  Hepatology was consulted and felt acute liver injury related to infection versus medication, and not underlying liver disease. And ammonia was checked and was elevated, but hepatology recommended against treatment as they felt patient did not have hepatic encephalopathy.  Right upper quadrant ultrasound showed biliary sludge and gallbladder thickening but was negative for acute cholecystitis.  A subsequent HIDA scan was not consistent with acute cholecystitis; no surgical intervention needed per General surgery evaluation.  Patient's mental status improved in the ICU and is now on nasal cannula oxygen and BiPAP at night.  Medicine initially consulted for assistance with management and noted patient to be markedly fluid overloaded, estimated to be approximately net positive 16 L with this admission.  She had elevated CVP and TTE was consistent volume overload as well as her BNP of > 4900.  She was started on IV diuresis with Lasix with improvement in urine output.  She is to be stepped down to Hospital Medicine for further management and ultimately skilled nursing facility placement.    MICU Course:  Pt stepped up to ICU on 3/8.  She  was started on Levophed and stress-dose steroids for hypotension in the setting of likely septic shock and broad-spectrum antimicrobial coverage with vancomycin, Zosyn, and fluconazole.  CT abdomen/pelvis with contrast obtained due to history of intra-abdominal abscess, which showed RLL pneumonia and proctitis.  Pt weaned off of vasopressors on 3/9.    Interval History/Significant Events: Levophed weaned overnight.  Pt denies any complaints this morning.    Review of Systems   Constitutional: Negative for chills, diaphoresis and fever.   HENT: Negative for postnasal drip.    Respiratory: Negative for cough, chest tightness, shortness of breath and wheezing.    Cardiovascular: Negative for chest pain and leg swelling.   Gastrointestinal: Negative for abdominal pain, constipation, diarrhea, nausea and vomiting.   Genitourinary: Negative for difficulty urinating, dysuria, flank pain, frequency, hematuria and urgency.   Musculoskeletal: Negative for arthralgias and back pain.   Skin: Negative for color change, pallor, rash and wound.   Neurological: Negative for dizziness, weakness, numbness and headaches.   Psychiatric/Behavioral: Negative for confusion and dysphoric mood. The patient is not nervous/anxious.      Objective:     Vital Signs (Most Recent):  Temp: 97.7 °F (36.5 °C) (03/09/19 0715)  Pulse: 81 (03/09/19 0930)  Resp: 16 (03/09/19 0930)  BP: (!) 184/96 (03/09/19 0930)  SpO2: 97 % (03/09/19 0930) Vital Signs (24h Range):  Temp:  [97.7 °F (36.5 °C)-100 °F (37.8 °C)] 97.7 °F (36.5 °C)  Pulse:  [] 81  Resp:  [5-40] 16  SpO2:  [90 %-100 %] 97 %  BP: ()/(44-96) 184/96   Weight: 61.7 kg (136 lb)  Body mass index is 24.87 kg/m².      Intake/Output Summary (Last 24 hours) at 3/9/2019 1028  Last data filed at 3/9/2019 0900  Gross per 24 hour   Intake 791 ml   Output 1129 ml   Net -338 ml       Physical Exam   Constitutional: She is oriented to person, place, and time. She appears well-developed. Nasal  cannula in place.   HENT:   Head: Normocephalic and atraumatic.   Eyes: EOM are normal. Pupils are equal, round, and reactive to light.   Neck: Neck supple. No JVD present.   Cardiovascular: Normal rate, regular rhythm and normal heart sounds.   Pulmonary/Chest: No accessory muscle usage. No respiratory distress. She has rales in the right lower field.   Abdominal: She exhibits no distension. There is tenderness. There is no rebound.   Musculoskeletal: She exhibits no edema or tenderness.   Neurological: She is alert and oriented to person, place, and time.   Skin: Skin is warm and dry. She is not diaphoretic.   Vitals reviewed.      Vents:  Vent Mode: A/C (01/29/19 0140)  Ventilator Initiated: Yes (01/26/19 0239)  Set Rate: 15 bmp (01/29/19 0140)  Vt Set: 450 mL (01/29/19 0140)  Pressure Support: 5 cmH20 (01/29/19 0140)  PEEP/CPAP: 5 cmH20 (01/28/19 1050)  Oxygen Concentration (%): 35 (03/05/19 2211)  Peak Airway Pressure: 0 cmH2O (01/29/19 0140)  Plateau Pressure: 0 cmH20 (01/29/19 0140)  Total Ve: 0 mL (01/29/19 0140)  F/VT Ratio<105 (RSBI): (!) 26.57 (01/28/19 1050)  Lines/Drains/Airways     Central Venous Catheter Line                 Port A Cath Single Lumen right subclavian -- days         Percutaneous Central Line Insertion/Assessment - triple lumen  03/08/19 1512 left internal jugular less than 1 day          Drain                 Gastrostomy/Enterostomy 03/02/19 1600 LUQ 6 days         Urethral Catheter 03/08/19 1600 less than 1 day              Significant Labs:    CBC/Anemia Profile:  Recent Labs   Lab 03/08/19  0822 03/08/19  1323 03/09/19  0308   WBC 21.71* 27.75* 22.83*   HGB 11.3* 9.9* 9.1*   HCT 38.6 34.0* 32.0*    240 236   * 107* 107*   RDW 18.6* 18.6* 18.6*        Chemistries:  Recent Labs   Lab 03/08/19  0822 03/08/19  1323 03/09/19  0308   * 133* 135*   K 4.5 4.0 4.6   CL 91* 92* 97   CO2 30* 32* 29   BUN 28* 34* 32*   CREATININE 0.7 0.8 0.7   CALCIUM 10.4 9.7 9.7   ALBUMIN  2.4* 2.2* 2.2*   PROT 7.3 6.8 6.6   BILITOT 4.1* 4.2* 3.9*   ALKPHOS 1,343* 1,257* 1,077*   ALT 95* 80* 65*   * 123* 70*   MG 2.1 2.0 2.1   PHOS 2.8 2.6* 4.3       Blood Culture:   Recent Labs   Lab 03/08/19  1323   LABBLOO No Growth to date  No Growth to date     All pertinent labs within the past 24 hours have been reviewed.    Significant Imaging:  I have reviewed all pertinent imaging results/findings within the past 24 hours.      ABG  Recent Labs   Lab 03/08/19  1248   PH 7.478*   PO2 78*   PCO2 45.9*   HCO3 34.0*   BE 11     Assessment/Plan:     Neuro   CVA, old, hemiparesis    - Statin has been held in setting of elevated liver enzymes  - Continue DAPT for secondary prevention:  · Continue ASA 81mg daily  · Continue Plavix 75mg daily     Psychiatric   Depression    - Continue duloxetine 60mg daily     Derm   Alteration in skin integrity related to surgical incision    - Wound Care consulted, appreciate assistance  - Continue collagenase ointment     Pulmonary   Right lower lobe pneumonia    - See Septic shock  - Likely 2/2 aspiration     Chronic respiratory failure with hypoxia and hypercapnia    - Pt with COPD on home oxygen 2 L NC  - ABG on 3/8: 7.478 / 45.9 / 78 / 30  - Continue oxygen supplementation PRN  - BiPAP qHS  - Goal SpO2 88-92%     Moderate asthma without complication    - Continue Breo daily  - Continue theophylline q12h  - Continue duonebs q6h while awake  - Continue duonebs q4h PRN  - Holding prednisone 10mg daily while giving stress-dose steroids     Cardiac/Vascular   Acute on chronic diastolic (congestive) heart failure    - Pt on Lasix 40mg per G tube every other day  - Holding diuresis in setting of shock  - TTE from 2/22/2019 showed:  · Low normal left ventricular systolic function. The estimated ejection fraction is 50%  · Grade II (moderate) left ventricular diastolic dysfunction consistent with pseudonormalization.  · Moderate mitral regurgitation.  · Low normal right  "ventricular systolic function.  · Mild tricuspid regurgitation.  · The estimated PA systolic pressure is 59 mm Hg. Pulmonary hypertension present.  · Elevated central venous pressure (15 mm Hg).  · Severe left atrial enlargement.     ID   * Septic shock    Pt is a 64 yo female with HFpEF, COPD, CAD, T2DM, CVA with right-sided residual deficits, admitted with abdominal pain, found to have intra-abdominal abscess (s/p ex lap with drainage on 1/25).  Pt's hospital course has been complicated by septic shock secondary to MRSA bacteremia for which she received 2 weeks of IV vancomycin and briefly required pressors.  Wound culture also grew Bacteroides and patient completed 4 weeks of Cipro and Flagyl.  On 3/8, pt noted to be hypotensive and tachycardic, so Critical Care consulted and pt started on vasopressor support.  Pt also with Tmax of 100 F and leukocytosis of 22k.    Plan  - Concern for intra-abdominal source given hx of pelvic abscess and abdominal tenderness on exam  - CXR with "right basal edema versus infiltrate and right pleural fluid possibly pneumonia"  - UA with trace leukocytes, UCx pending  - CT abdomen/pelvis with contrast on 3/8 showed "Worsening right lower lobe consolidation with areas of internal gas, raising the question of pneumonia with early necrotic change." and "mild wall thickening and hyperemia of the rectum suggesting possible proctitis."  - MAP goal >65, Levophed discontinued on 3/8  - BCx NGTD  - Continue vancomycin and Zosyn for HCAP, de-escalate following 48+ hours negative cultures  - Discontinue fluconazole 400mg IV q24h  - Decrease hydrocortisone from 100mg to 50mg IV q8h      Endocrine   Abnormal thyroid function test    - TSH 4.718 / fT4 0.70 on 2/19  - TSH 1.305 / fT4 0.86 / T3 41 on 3/9, consistent with euthyroid hypothyroxinema     Severe malnutrition    - Resume tubes feeds     Type 2 diabetes mellitus without complication, without long-term current use of insulin    - Resume " "tube feeds  - Continue Accuchecks q4h  - Continue MDSSI  - Resume Neurontin     GI   Proctitis    - CT A/P with contrast on 3/8 with "Mild wall thickening and hyperemia of the rectum suggesting possible proctitis.  Colonic diverticulosis without diverticulitis."  - Start docusate per G tube daily     Elevated liver enzymes    - Pt with elevated LFTs this admission.  - Hepatology was consulted and felt this was secondary to pt's acute illness vs drug-induced  - Enzymes have been trending down  -  / ALT 80 / ALKP 1257 / TBili 4.2 on 3/8  - Continue ursodiol 500mg BID     Dysphagia    - SLP following, appreciate assistance  - NPO     Orthopedic   Multiple skin tears    - See Alteration in skin integrity related to surgical incision     Other   Debility    - PT/OT        Critical Care Daily Checklist:    A: Awake: RASS Goal/Actual Goal: RASS Goal: 0-->alert and calm  Actual: Schwab Agitation Sedation Scale (RASS): Drowsy   B: Spontaneous Breathing Trial Performed? Spon. Breathing Trial Initiated?: Initiated (01/28/19 1056)   C: SAT & SBT Coordinated?  N/A                      D: Delirium: CAM-ICU Overall CAM-ICU: Negative   E: Early Mobility Performed? Yes   F: Feeding Goal: Goals: Patient to meet > 85% EEN and EPN  Status: Nutrition Goal Status: goal met   Current Diet Order   Procedures    Diet NPO Except for: Medication     Order Specific Question:   Except for     Answer:   Medication      AS: Analgesia/Sedation Gabapentin qHS   T: Thromboembolic Prophylaxis SQH   H: HOB > 300 Yes   U: Stress Ulcer Prophylaxis (if needed) N/A (Protonix)   G: Glucose Control MDSSI   B: Bowel Function Stool Occurrence: 1   I: Indwelling Catheter (Lines & Ritchie) Necessity Ritchie, central line (d/c today)   D: De-escalation of Antimicrobials/Pharmacotherapies Continue vancomycin/Zosyn. Discontinue fluconazole.    Plan for the day/ETD Step down    Code Status:  Family/Goals of Care: Full Code  Ongoing       Critical secondary to " Patient has a condition that poses threat to life and bodily function: Septic shock      Critical care was time spent personally by me on the following activities: development of treatment plan with patient or surrogate and bedside caregivers, discussions with consultants, evaluation of patient's response to treatment, examination of patient, ordering and performing treatments and interventions, ordering and review of laboratory studies, ordering and review of radiographic studies, pulse oximetry, re-evaluation of patient's condition. This critical care time did not overlap with that of any other provider or involve time for any procedures.     Arsenio Riggs MD  Critical Care Medicine  [facility]

## 2019-03-09 NOTE — ASSESSMENT & PLAN NOTE
- Statin has been held in setting of elevated liver enzymes  - Continue DAPT for secondary prevention:  · Continue ASA 81mg daily  · Continue Plavix 75mg daily

## 2019-03-09 NOTE — SUBJECTIVE & OBJECTIVE
Interval History/Significant Events: Levophed weaned overnight.  Pt denies any complaints this morning.    Review of Systems   Constitutional: Negative for chills, diaphoresis and fever.   HENT: Negative for postnasal drip.    Respiratory: Negative for cough, chest tightness, shortness of breath and wheezing.    Cardiovascular: Negative for chest pain and leg swelling.   Gastrointestinal: Negative for abdominal pain, constipation, diarrhea, nausea and vomiting.   Genitourinary: Negative for difficulty urinating, dysuria, flank pain, frequency, hematuria and urgency.   Musculoskeletal: Negative for arthralgias and back pain.   Skin: Negative for color change, pallor, rash and wound.   Neurological: Negative for dizziness, weakness, numbness and headaches.   Psychiatric/Behavioral: Negative for confusion and dysphoric mood. The patient is not nervous/anxious.      Objective:     Vital Signs (Most Recent):  Temp: 97.7 °F (36.5 °C) (03/09/19 0715)  Pulse: 81 (03/09/19 0930)  Resp: 16 (03/09/19 0930)  BP: (!) 184/96 (03/09/19 0930)  SpO2: 97 % (03/09/19 0930) Vital Signs (24h Range):  Temp:  [97.7 °F (36.5 °C)-100 °F (37.8 °C)] 97.7 °F (36.5 °C)  Pulse:  [] 81  Resp:  [5-40] 16  SpO2:  [90 %-100 %] 97 %  BP: ()/(44-96) 184/96   Weight: 61.7 kg (136 lb)  Body mass index is 24.87 kg/m².      Intake/Output Summary (Last 24 hours) at 3/9/2019 1028  Last data filed at 3/9/2019 0900  Gross per 24 hour   Intake 791 ml   Output 1129 ml   Net -338 ml       Physical Exam   Constitutional: She is oriented to person, place, and time. She appears well-developed. Nasal cannula in place.   HENT:   Head: Normocephalic and atraumatic.   Eyes: EOM are normal. Pupils are equal, round, and reactive to light.   Neck: Neck supple. No JVD present.   Cardiovascular: Normal rate, regular rhythm and normal heart sounds.   Pulmonary/Chest: No accessory muscle usage. No respiratory distress. She has rales in the right lower field.    Abdominal: She exhibits no distension. There is tenderness. There is no rebound.   Musculoskeletal: She exhibits no edema or tenderness.   Neurological: She is alert and oriented to person, place, and time.   Skin: Skin is warm and dry. She is not diaphoretic.   Vitals reviewed.      Vents:  Vent Mode: A/C (01/29/19 0140)  Ventilator Initiated: Yes (01/26/19 0239)  Set Rate: 15 bmp (01/29/19 0140)  Vt Set: 450 mL (01/29/19 0140)  Pressure Support: 5 cmH20 (01/29/19 0140)  PEEP/CPAP: 5 cmH20 (01/28/19 1050)  Oxygen Concentration (%): 35 (03/05/19 2211)  Peak Airway Pressure: 0 cmH2O (01/29/19 0140)  Plateau Pressure: 0 cmH20 (01/29/19 0140)  Total Ve: 0 mL (01/29/19 0140)  F/VT Ratio<105 (RSBI): (!) 26.57 (01/28/19 1050)  Lines/Drains/Airways     Central Venous Catheter Line                 Port A Cath Single Lumen right subclavian -- days         Percutaneous Central Line Insertion/Assessment - triple lumen  03/08/19 1512 left internal jugular less than 1 day          Drain                 Gastrostomy/Enterostomy 03/02/19 1600 LUQ 6 days         Urethral Catheter 03/08/19 1600 less than 1 day              Significant Labs:    CBC/Anemia Profile:  Recent Labs   Lab 03/08/19 0822 03/08/19  1323 03/09/19  0308   WBC 21.71* 27.75* 22.83*   HGB 11.3* 9.9* 9.1*   HCT 38.6 34.0* 32.0*    240 236   * 107* 107*   RDW 18.6* 18.6* 18.6*        Chemistries:  Recent Labs   Lab 03/08/19  0822 03/08/19  1323 03/09/19  0308   * 133* 135*   K 4.5 4.0 4.6   CL 91* 92* 97   CO2 30* 32* 29   BUN 28* 34* 32*   CREATININE 0.7 0.8 0.7   CALCIUM 10.4 9.7 9.7   ALBUMIN 2.4* 2.2* 2.2*   PROT 7.3 6.8 6.6   BILITOT 4.1* 4.2* 3.9*   ALKPHOS 1,343* 1,257* 1,077*   ALT 95* 80* 65*   * 123* 70*   MG 2.1 2.0 2.1   PHOS 2.8 2.6* 4.3       Blood Culture:   Recent Labs   Lab 03/08/19  1323   LABBLOO No Growth to date  No Growth to date     All pertinent labs within the past 24 hours have been reviewed.    Significant  Imaging:  I have reviewed all pertinent imaging results/findings within the past 24 hours.

## 2019-03-09 NOTE — H&P
[facility]  Critical Care Medicine  History & Physical    Patient Name: Sheryl Martines  MRN: [id number]  Admission Date: 1/23/2019  Hospital Length of Stay: 43 days  Code Status: Full Code  Attending Physician: Shorty Godinez MD   Primary Care Provider: Primary Doctor No   Principal Problem: Septic shock    Subjective:     HPI:  Sheryl Martines is a 63 y.o. Female with HFpEF, COPD, CAD, T2DM, previous CVA with right-sided residual deficits, s/p appendectomy in 8/2018 c/b intra-abdominal abscess (s/p ex lap 1/25/19).  Her post-operative course this admission has been complicated by septic shock secondary to MRSA bacteremia for which she received 2 weeks of IV vancomycin and briefly required vasopressors, as well as respiratory failure requiring mechanical ventilation.  Wound culture also grew Bacteroides and patient completed 4 weeks of ciprofloxacin and Flagyl earlier this admission.    On the morning of 3/8, pt noted to be hypotensive (BP 68/44) and tachycardic (), with Tmax 100 F.  Rapid Response called and Critical Care consulted.  Pt started on Levophed and broad-spectrum antibiotics and transferred to the ICU.    Hospital/ICU Course:  Per Hospital Medicine HPI:  Patient is a 63 y.o. with chronic diastolic heart failure, COPD on home oxygen at 2 liters, chronic debility, CAD, Type 2 diabetes, HTN, previous CVA with residual right sided weakness, prior appendectomy in 8/2018 complicated by C. Diff infection, failure to thrive admitted to the hospital on 1/23/2019 with nausea vomiting and abdominal pain found to have fluid collection in R hemipelvis. Started on vanc/zosyn. Surgery consulted, s/p ex lap, washout and pelvic abscess drainage 1/25. Post-op course complicated by septic shock secondary to MRSA bacteremia for which she received 2 weeks of IV vancomycin and briefly required pressors.  Wound culture also grew Bacteroides and patient completed 4 weeks of Cipro and Flagyl.   Patient remained intubated postoperatively and was successfully extubated on 01/28.  Initially, she was stabilized and transferred to the floor.  She failed speech language therapy and was started on TPN with subsequent placement of IR guided G-tube in transition to tube feeds on 02/10.  On the floor, patient was making some progress and being followed by [facility]; however, a rapid response was called on 02/19 due to somnolence.  ABG was consistent with acute hypercapnic respiratory failure, and she was placed on BiPAP and transferred back to SICU.  Infectious workup was significant for a UA with 39 WBCs, moderate bacteria, and moderate yeast; urine culture ultimately grew Candida for which she was started on Diflucan IV.  She was also found to have markedly elevated alk-phos, AST, and ALT.  Hepatology was consulted and felt acute liver injury related to infection versus medication, and not underlying liver disease. And ammonia was checked and was elevated, but hepatology recommended against treatment as they felt patient did not have hepatic encephalopathy.  Right upper quadrant ultrasound showed biliary sludge and gallbladder thickening but was negative for acute cholecystitis.  A subsequent HIDA scan was not consistent with acute cholecystitis; no surgical intervention needed per General surgery evaluation.  Patient's mental status improved in the ICU and is now on nasal cannula oxygen and BiPAP at night.  Medicine initially consulted for assistance with management and noted patient to be markedly fluid overloaded, estimated to be approximately net positive 16 L with this admission.  She had elevated CVP and TTE was consistent volume overload as well as her BNP of > 4900.  She was started on IV diuresis with Lasix with improvement in urine output.  She is to be stepped down to Hospital Medicine for further management and ultimately skilled nursing facility placement.     Past Medical History:   Diagnosis Date     Abnormal LFTs 12/14/2018    Closed compression fracture of fourth lumbar vertebra     Closed fracture of right tibia and fibula 10/3/2018    COPD (chronic obstructive pulmonary disease)     home O2    Coronary artery disease     Diabetes mellitus     Fall 10/4/2018    Glaucoma     High cholesterol     Hypertension     Infected incision 9/20/2018    Iritis     Pulmonary embolus     S/P appendectomy 9/11/2018    Stroke     rt sided weakness.       Past Surgical History:   Procedure Laterality Date    ABDOMINAL SURGERY      APPENDECTOMY N/A 8/26/2018    Performed by [facility]adine [facility]rez [facility] at [facility] OR    APPENDECTOMY, LAPAROSCOPIC---CONVERTED TO OPEN APPENDECTOMY @0950 N/A 8/26/2018    Performed by [facility] at [facility] OR    APPLICATION, WOUND VAC N/A 1/25/2019    Performed by Monster Laurent MD at [facility] OR [address] [place] [address]    BACK SURGERY      stimulator    CATARACT EXTRACTION      EXCISION, ABSCESS- drainage abdominal wall abscess N/A 1/25/2019    Performed by Monster Laurent MD at [facility] OR [address] [place] [address]    HYSTERECTOMY      INCISION AND DRAINAGE, ABSCESS-  drainage of pelvic abscess N/A 1/25/2019    Performed by Monster Laurent MD at [facility] OR [address] [place] [address]    LAPAROTOMY, EXPLORATORY N/A 1/25/2019    Performed by Monster Laurent MD at [facility] OR [address] [place] [address]    TOTAL HIP ARTHROPLASTY Left     2008       Review of patient's allergies indicates:   Allergen Reactions    Ace inhibitors Swelling    Carvedilol      Other reaction(s): Other (See Comments)  blister    Ciprofloxacin      Elevated liver enzymes    Hydralazine analogues     Metformin Nausea And Vomiting    Tetracycline Itching    Tetracyclines Swelling    Travatan (with benzalkonium) [travoprost (benzalkonium)]     Travoprost Itching     Other reaction(s): Other (See Comments)  Blurry vision       Family History     Problem Relation (Age of Onset)    Cancer Father    Diabetes Brother    Lupus Sister    Multiple sclerosis  Mother        Tobacco Use    Smoking status: Never Smoker    Smokeless tobacco: Never Used   Substance and Sexual Activity    Alcohol use: No     Frequency: Never    Drug use: No    Sexual activity: No     Partners: Male      Review of Systems   Unable to perform ROS: Acuity of condition     Objective:     Vital Signs (Most Recent):  Temp: 98.1 °F (36.7 °C) (03/08/19 1900)  Pulse: 88 (03/08/19 1915)  Resp: (!) 23 (03/08/19 1915)  BP: (!) 151/62 (03/08/19 1915)  SpO2: 100 % (03/08/19 1915) Vital Signs (24h Range):  Temp:  [97.7 °F (36.5 °C)-100 °F (37.8 °C)] 98.1 °F (36.7 °C)  Pulse:  [] 88  Resp:  [18-34] 23  SpO2:  [90 %-100 %] 100 %  BP: ()/(44-88) 151/62   Weight: 61.7 kg (136 lb)  Body mass index is 24.87 kg/m².      Intake/Output Summary (Last 24 hours) at 3/8/2019 1938  Last data filed at 3/8/2019 1900  Gross per 24 hour   Intake 790 ml   Output 175 ml   Net 615 ml       Physical Exam   Constitutional: She is oriented to person, place, and time. She appears well-developed. Nasal cannula in place.   HENT:   Head: Normocephalic and atraumatic.   Eyes: EOM are normal. Pupils are equal, round, and reactive to light.   Neck: Neck supple. No JVD present.   Cardiovascular: Regular rhythm and normal heart sounds. Tachycardia present.   Pulmonary/Chest: No accessory muscle usage. No respiratory distress. She has rales in the right lower field.   Abdominal: She exhibits no distension. There is tenderness. There is no rebound.   Musculoskeletal: She exhibits no edema or tenderness.   Neurological: She is alert and oriented to person, place, and time.   Skin: Skin is warm and dry. She is not diaphoretic.   Vitals reviewed.      Vents:  Vent Mode: A/C (01/29/19 0140)  Ventilator Initiated: Yes (01/26/19 0239)  Set Rate: 15 bmp (01/29/19 0140)  Vt Set: 450 mL (01/29/19 0140)  Pressure Support: 5 cmH20 (01/29/19 0140)  PEEP/CPAP: 5 cmH20 (01/28/19 1050)  Oxygen Concentration (%): 35 (03/05/19 2211)  Peak  Airway Pressure: 0 cmH2O (01/29/19 0140)  Plateau Pressure: 0 cmH20 (01/29/19 0140)  Total Ve: 0 mL (01/29/19 0140)  F/VT Ratio<105 (RSBI): (!) 26.57 (01/28/19 1050)     Lines/Drains/Airways     Central Venous Catheter Line                 Percutaneous Central Line Insertion/Assessment - triple lumen  03/08/19 1512 left internal jugular less than 1 day          Drain                 Gastrostomy/Enterostomy 03/02/19 1600 LUQ 6 days         Urethral Catheter 03/08/19 1600 less than 1 day              Significant Labs:    CBC/Anemia Profile:  Recent Labs   Lab 03/07/19  0710 03/08/19  0822 03/08/19  1323   WBC 14.79* 21.71* 27.75*   HGB 10.4* 11.3* 9.9*   HCT 35.4* 38.6 34.0*    243 240   * 107* 107*   RDW 19.8* 18.6* 18.6*        Chemistries:  Recent Labs   Lab 03/07/19  0710 03/08/19  0822 03/08/19  1323    131* 133*   K 4.2 4.5 4.0   CL 94* 91* 92*   CO2 34* 30* 32*   BUN 25* 28* 34*   CREATININE 0.6 0.7 0.8   CALCIUM 10.0 10.4 9.7   ALBUMIN 2.2* 2.4* 2.2*   PROT 6.8 7.3 6.8   BILITOT 3.9* 4.1* 4.2*   ALKPHOS 1,423* 1,343* 1,257*   * 95* 80*   * 161* 123*   MG 1.6 2.1 2.0   PHOS 3.4 2.8 2.6*       ABGs:   Recent Labs   Lab 03/08/19  1248   PH 7.478*   PCO2 45.9*   HCO3 34.0*   POCSATURATED 96   BE 11     Bilirubin:   Recent Labs   Lab 03/05/19  0713 03/06/19  0828 03/07/19  0710 03/08/19  0822 03/08/19  1323   BILITOT 4.4* 4.2* 3.9* 4.1* 4.2*     POCT Glucose:   Recent Labs   Lab 03/07/19  2258 03/08/19  1251 03/08/19  1743   POCTGLUCOSE 112* 210* 253*     Troponin:   Recent Labs   Lab 03/08/19  1323   TROPONINI 0.041*  0.040*     Urine Studies:   Recent Labs   Lab 03/08/19  1715   COLORU Amanda   APPEARANCEUA Cloudy*   PHUR 5.0   SPECGRAV 1.020   PROTEINUA 1+*   GLUCUA Negative   KETONESU Negative   BILIRUBINUA Negative   OCCULTUA 3+*   NITRITE Negative   LEUKOCYTESUR Trace*   RBCUA >100*   WBCUA 10*   BACTERIA Rare   SQUAMEPITHEL 0   HYALINECASTS 1     All pertinent labs within  the past 24 hours have been reviewed.    Significant Imaging: I have reviewed all pertinent imaging results/findings within the past 24 hours.   Imaging Results          CT Abdomen Pelvis With Contrast (Final result)     Abnormal  Result time 01/23/19 18:53:21    Final result by Tonny Rodgers MD (01/23/19 18:53:21)                 Impression:      Posterior right hemipelvis rim enhancing fluid collection, suspicious for abscess.  Possible associated retained appendicolith.    Adjacent urinary bladder wall thickening, suspicious for superimposed cystitis.    This report was flagged in Epic as abnormal.      Electronically signed by: Tonny Rodgers MD  Date:    01/23/2019  Time:    18:53             Narrative:    EXAMINATION:  CT ABDOMEN PELVIS WITH CONTRAST    CLINICAL HISTORY:  Nausea, vomiting, diarrhea;    TECHNIQUE:  Low dose axial images, sagittal and coronal reformations were obtained from the lung bases to the pubic symphysis following the IV administration of 75 mL of Omnipaque 350 .  Oral contrast was not given.    COMPARISON:  CT and ultrasound exams dating back to December 12, 2018    FINDINGS:  Visualized lower chest is unremarkable.    The liver demonstrates diffuse steatosis, unchanged.    The gallbladder, pancreas, adrenals, and spleen are unremarkable.    Scattered colonic diverticula.  No bowel wall thickening or dilation.    No adenopathy.    Unchanged small right renal cyst.  The kidneys and ureters are otherwise unremarkable.    The urinary bladder demonstrates circumferential wall thickening.    Evaluation of the pelvis is limited by beam hardening artifacts caused by left hip arthroplasty hardware.    In the right hemipelvis at the site of prior appendectomy, there is an irregular shaped rim enhancing fluid collection measuring at least 4.0 x 3.0 cm.  Associated surrounding fat stranding and inflammatory change.  This fluid collection abuts the superior right aspect of the urinary bladder  dome.  Possible associated retained appendicolith, to the right of the fluid collection.    Hysterectomy.    Body wall soft tissues are unremarkable.    No acute bone abnormality.  Unchanged multilevel lumbar spine compression deformities.                                  Assessment/Plan:     Neuro   CVA, old, hemiparesis    - Statin has been held in setting of elevated liver enzymes  - Continue DAPT for secondary prevention:  - Continue ASA 81mg daily  - Continue Plavix 75mg daily     Psychiatric   Depression    - Continue duloxetine 60mg daily     Derm   Alteration in skin integrity related to surgical incision    - Wound Care consulted, appreciate assistance  - Continue collagenase ointment     Pulmonary   Chronic respiratory failure with hypoxia and hypercapnia    - Pt with COPD on home oxygen 2 L NC  - ABG on 3/8: 7.478 / 45.9 / 78 / 30  - Continue oxygen supplementation  - Goal SpO2 88-92%     Moderate asthma without complication    - Continue Breo daily  - Continue theophylline q12h  - Continue duonebs q6h while awake  - Continue duonebs q4h PRN  - Holding prednisone 10mg daily while giving stress-dose steroids     Cardiac/Vascular   Acute on chronic diastolic (congestive) heart failure    - Pt on Lasix 40mg per G tube every other day  - Holding diuresis in setting of shock  - TTE from 2/22/2019 showed:  · Low normal left ventricular systolic function. The estimated ejection fraction is 50%  · Grade II (moderate) left ventricular diastolic dysfunction consistent with pseudonormalization.  · Moderate mitral regurgitation.  · Low normal right ventricular systolic function.  · Mild tricuspid regurgitation.  · The estimated PA systolic pressure is 59 mm Hg. Pulmonary hypertension present.  · Elevated central venous pressure (15 mm Hg).  · Severe left atrial enlargement.     ID   * Septic shock    Pt is a 62 yo female with HFpEF, COPD, CAD, T2DM, CVA with right-sided residual deficits, admitted with abdominal  "pain, found to have intra-abdominal abscess (s/p ex lap with drainage on 1/25).  Pt's hospital course has been complicated by septic shock secondary to MRSA bacteremia for which she received 2 weeks of IV vancomycin and briefly required pressors.  Wound culture also grew Bacteroides and patient completed 4 weeks of Cipro and Flagyl.  On 3/8, pt noted to be hypotensive and tachycardic, so Critical Care consulted and pt started on vasopressor support.  Pt also with Tmax of 100 F and leukocytosis of 22k.    Plan  - Concern for intra-abdominal source given hx of pelvic abscess and abdominal tenderness on exam  - CXR with "right basal edema versus infiltrate and right pleural fluid possibly pneumonia"  - UA with trace leukocytes, UCx pending  - CT abdomen/pelvis with contrast pending  - BCx pending  - Continue vancomycin and Zosyn  - Continue fluconazole 400mg IV q24h  - Continue Levophed for goal MAP >65  - Continue hydrocortisone 100mg IV q8h          Endocrine   Abnormal thyroid function test    - TSH 4.718 / fT4 0.70 on 2/19  - Repeat TFTs pending     Severe malnutrition    - Holding tubes feeds  - Resume when safe     Type 2 diabetes mellitus without complication, without long-term current use of insulin    - Holding tube feeds  - Continue Accuchecks q4h  - Continue MDSSI  - Holding Neurontin     GI   Elevated liver enzymes    - Pt with elevated LFTs this admission.  - Hepatology was consulted and felt this was secondary to pt's acute illness vs drug-induced  - Enzymes have been trending down  -  / ALT 80 / ALKP 1257 / TBili 4.2 on 3/8  - Continue ursodiol 500mg BID     Dysphagia    - SLP following, appreciate assistance  - NPO     Other   Debility    - PT/OT         Critical Care Daily Checklist:    A: Awake: RASS Goal/Actual Goal: RASS Goal: 0-->alert and calm  Actual: Schwab Agitation Sedation Scale (RASS): Alert and calm   B: Spontaneous Breathing Trial Performed? Spon. Breathing Trial Initiated?: " Initiated (01/28/19 1056)   C: SAT & SBT Coordinated?  N/A                      D: Delirium: CAM-ICU Overall CAM-ICU: Negative   E: Early Mobility Performed? Yes   F: Feeding Goal: Goals: Patient to meet > 85% EEN and EPN  Status: Nutrition Goal Status: goal met   Current Diet Order   Procedures    Diet NPO Except for: Medication     Order Specific Question:   Except for     Answer:   Medication      AS: Analgesia/Sedation Avoid   T: Thromboembolic Prophylaxis SQH   H: HOB > 300 Yes   U: Stress Ulcer Prophylaxis (if needed) N/A   G: Glucose Control MDSSI   B: Bowel Function Stool Occurrence: 1   I: Indwelling Catheter (Lines & Ritchie) Necessity Ritchie, TLC   D: De-escalation of Antimicrobials/Pharmacotherapies Continue vanc/zosyn/fluconazole    Plan for the day/ETD CT A/P with contrast    Code Status:  Family/Goals of Care: Full Code  TBD       Critical secondary to Patient has a condition that poses threat to life and bodily function: Septic shock     Critical care was time spent personally by me on the following activities: development of treatment plan with patient or surrogate and bedside caregivers, discussions with consultants, evaluation of patient's response to treatment, examination of patient, ordering and performing treatments and interventions, ordering and review of laboratory studies, ordering and review of radiographic studies, pulse oximetry, re-evaluation of patient's condition. This critical care time did not overlap with that of any other provider or involve time for any procedures.     Arsenio Riggs MD  Critical Care Medicine  [facility]

## 2019-03-09 NOTE — ASSESSMENT & PLAN NOTE
- Pt with COPD on home oxygen 2 L NC  - ABG on 3/8: 7.478 / 45.9 / 78 / 30  - Continue oxygen supplementation PRN  - BiPAP qHS  - Goal SpO2 88-92%

## 2019-03-09 NOTE — SUBJECTIVE & OBJECTIVE
Past Medical History:   Diagnosis Date    Abnormal LFTs 12/14/2018    Closed compression fracture of fourth lumbar vertebra     Closed fracture of right tibia and fibula 10/3/2018    COPD (chronic obstructive pulmonary disease)     home O2    Coronary artery disease     Diabetes mellitus     Fall 10/4/2018    Glaucoma     High cholesterol     Hypertension     Infected incision 9/20/2018    Iritis     Pulmonary embolus     S/P appendectomy 9/11/2018    Stroke     rt sided weakness.       Past Surgical History:   Procedure Laterality Date    ABDOMINAL SURGERY      APPENDECTOMY N/A 8/26/2018    Performed by Bernadine Melendrez MD at [facility] OR    APPENDECTOMY, LAPAROSCOPIC---CONVERTED TO OPEN APPENDECTOMY @0950 N/A 8/26/2018    Performed by Bern[facility]ine [facility]rez [facility] at [facility] OR    APPLICATION, WOUND VAC N/A 1/25/2019    Performed by Monster Laurent MD at [facility] OR [address] [place] [address]    BACK SURGERY      stimulator    CATARACT EXTRACTION      EXCISION, ABSCESS- drainage abdominal wall abscess N/A 1/25/2019    Performed by Monster Laurent MD at [facility] OR [address] [place] [address]    HYSTERECTOMY      INCISION AND DRAINAGE, ABSCESS-  drainage of pelvic abscess N/A 1/25/2019    Performed by Monster Laurent MD at [facility] OR [address] [place] [address]    LAPAROTOMY, EXPLORATORY N/A 1/25/2019    Performed by Monster Laurent MD at [facility] OR [address] [place] [address]    TOTAL HIP ARTHROPLASTY Left     2008       Review of patient's allergies indicates:   Allergen Reactions    Ace inhibitors Swelling    Carvedilol      Other reaction(s): Other (See Comments)  blister    Ciprofloxacin      Elevated liver enzymes    Hydralazine analogues     Metformin Nausea And Vomiting    Tetracycline Itching    Tetracyclines Swelling    Travatan (with benzalkonium) [travoprost (benzalkonium)]     Travoprost Itching     Other reaction(s): Other (See Comments)  Blurry vision       Family History     Problem Relation (Age of Onset)    Cancer Father    Diabetes Brother     Lupus Sister    Multiple sclerosis Mother        Tobacco Use    Smoking status: Never Smoker    Smokeless tobacco: Never Used   Substance and Sexual Activity    Alcohol use: No     Frequency: Never    Drug use: No    Sexual activity: No     Partners: Male      Review of Systems   Unable to perform ROS: Acuity of condition     Objective:     Vital Signs (Most Recent):  Temp: 98.1 °F (36.7 °C) (03/08/19 1900)  Pulse: 88 (03/08/19 1915)  Resp: (!) 23 (03/08/19 1915)  BP: (!) 151/62 (03/08/19 1915)  SpO2: 100 % (03/08/19 1915) Vital Signs (24h Range):  Temp:  [97.7 °F (36.5 °C)-100 °F (37.8 °C)] 98.1 °F (36.7 °C)  Pulse:  [] 88  Resp:  [18-34] 23  SpO2:  [90 %-100 %] 100 %  BP: ()/(44-88) 151/62   Weight: 61.7 kg (136 lb)  Body mass index is 24.87 kg/m².      Intake/Output Summary (Last 24 hours) at 3/8/2019 1938  Last data filed at 3/8/2019 1900  Gross per 24 hour   Intake 790 ml   Output 175 ml   Net 615 ml       Physical Exam   Constitutional: She is oriented to person, place, and time. She appears well-developed. Nasal cannula in place.   HENT:   Head: Normocephalic and atraumatic.   Eyes: EOM are normal. Pupils are equal, round, and reactive to light.   Neck: Neck supple. No JVD present.   Cardiovascular: Regular rhythm and normal heart sounds. Tachycardia present.   Pulmonary/Chest: No accessory muscle usage. No respiratory distress. She has rales in the right lower field.   Abdominal: She exhibits no distension. There is tenderness. There is no rebound.   Musculoskeletal: She exhibits no edema or tenderness.   Neurological: She is alert and oriented to person, place, and time.   Skin: Skin is warm and dry. She is not diaphoretic.   Vitals reviewed.      Vents:  Vent Mode: A/C (01/29/19 0140)  Ventilator Initiated: Yes (01/26/19 0239)  Set Rate: 15 bmp (01/29/19 0140)  Vt Set: 450 mL (01/29/19 0140)  Pressure Support: 5 cmH20 (01/29/19 0140)  PEEP/CPAP: 5 cmH20 (01/28/19 1050)  Oxygen  Concentration (%): 35 (03/05/19 2211)  Peak Airway Pressure: 0 cmH2O (01/29/19 0140)  Plateau Pressure: 0 cmH20 (01/29/19 0140)  Total Ve: 0 mL (01/29/19 0140)  F/VT Ratio<105 (RSBI): (!) 26.57 (01/28/19 1050)     Lines/Drains/Airways     Central Venous Catheter Line                 Percutaneous Central Line Insertion/Assessment - triple lumen  03/08/19 1512 left internal jugular less than 1 day          Drain                 Gastrostomy/Enterostomy 03/02/19 1600 LUQ 6 days         Urethral Catheter 03/08/19 1600 less than 1 day              Significant Labs:    CBC/Anemia Profile:  Recent Labs   Lab 03/07/19  0710 03/08/19  0822 03/08/19  1323   WBC 14.79* 21.71* 27.75*   HGB 10.4* 11.3* 9.9*   HCT 35.4* 38.6 34.0*    243 240   * 107* 107*   RDW 19.8* 18.6* 18.6*        Chemistries:  Recent Labs   Lab 03/07/19  0710 03/08/19  0822 03/08/19  1323    131* 133*   K 4.2 4.5 4.0   CL 94* 91* 92*   CO2 34* 30* 32*   BUN 25* 28* 34*   CREATININE 0.6 0.7 0.8   CALCIUM 10.0 10.4 9.7   ALBUMIN 2.2* 2.4* 2.2*   PROT 6.8 7.3 6.8   BILITOT 3.9* 4.1* 4.2*   ALKPHOS 1,423* 1,343* 1,257*   * 95* 80*   * 161* 123*   MG 1.6 2.1 2.0   PHOS 3.4 2.8 2.6*       ABGs:   Recent Labs   Lab 03/08/19  1248   PH 7.478*   PCO2 45.9*   HCO3 34.0*   POCSATURATED 96   BE 11     Bilirubin:   Recent Labs   Lab 03/05/19  0713 03/06/19  0828 03/07/19  0710 03/08/19  0822 03/08/19  1323   BILITOT 4.4* 4.2* 3.9* 4.1* 4.2*     POCT Glucose:   Recent Labs   Lab 03/07/19  2258 03/08/19  1251 03/08/19  1743   POCTGLUCOSE 112* 210* 253*     Troponin:   Recent Labs   Lab 03/08/19  1323   TROPONINI 0.041*  0.040*     Urine Studies:   Recent Labs   Lab 03/08/19  1715   COLORU Amanda   APPEARANCEUA Cloudy*   PHUR 5.0   SPECGRAV 1.020   PROTEINUA 1+*   GLUCUA Negative   KETONESU Negative   BILIRUBINUA Negative   OCCULTUA 3+*   NITRITE Negative   LEUKOCYTESUR Trace*   RBCUA >100*   WBCUA 10*   BACTERIA Rare   SQUAMEPITHEL 0    HYALINECASTS 1     All pertinent labs within the past 24 hours have been reviewed.    Significant Imaging: I have reviewed all pertinent imaging results/findings within the past 24 hours.   Imaging Results          CT Abdomen Pelvis With Contrast (Final result)     Abnormal  Result time 01/23/19 18:53:21    Final result by Tonny Rodgers MD (01/23/19 18:53:21)                 Impression:      Posterior right hemipelvis rim enhancing fluid collection, suspicious for abscess.  Possible associated retained appendicolith.    Adjacent urinary bladder wall thickening, suspicious for superimposed cystitis.    This report was flagged in Epic as abnormal.      Electronically signed by: Tonny Rodgers MD  Date:    01/23/2019  Time:    18:53             Narrative:    EXAMINATION:  CT ABDOMEN PELVIS WITH CONTRAST    CLINICAL HISTORY:  Nausea, vomiting, diarrhea;    TECHNIQUE:  Low dose axial images, sagittal and coronal reformations were obtained from the lung bases to the pubic symphysis following the IV administration of 75 mL of Omnipaque 350 .  Oral contrast was not given.    COMPARISON:  CT and ultrasound exams dating back to December 12, 2018    FINDINGS:  Visualized lower chest is unremarkable.    The liver demonstrates diffuse steatosis, unchanged.    The gallbladder, pancreas, adrenals, and spleen are unremarkable.    Scattered colonic diverticula.  No bowel wall thickening or dilation.    No adenopathy.    Unchanged small right renal cyst.  The kidneys and ureters are otherwise unremarkable.    The urinary bladder demonstrates circumferential wall thickening.    Evaluation of the pelvis is limited by beam hardening artifacts caused by left hip arthroplasty hardware.    In the right hemipelvis at the site of prior appendectomy, there is an irregular shaped rim enhancing fluid collection measuring at least 4.0 x 3.0 cm.  Associated surrounding fat stranding and inflammatory change.  This fluid collection abuts the  superior right aspect of the urinary bladder dome.  Possible associated retained appendicolith, to the right of the fluid collection.    Hysterectomy.    Body wall soft tissues are unremarkable.    No acute bone abnormality.  Unchanged multilevel lumbar spine compression deformities.

## 2019-03-09 NOTE — PROGRESS NOTES
[facility] Hospital Medicine                                                                     Progress Note     Team: [facility]ICINE Rich Stokes MD   Admit Date: 1/23/2019   Hospital Day: 43  TAMIKO: 3/11/2019   Code status: Full Code   Principal Problem: Septic shock     SUMMARY:     Patient is a 63 y.o. with chronic diastolic heart failure, COPD on home oxygen at 2 liters, chronic debility, CAD, Type 2 diabetes, HTN, previous CVA with residual right sided weakness, prior appendectomy in 8/2018 complicated by C. Diff infection, failure to thrive admitted to the hospital on 1/23/2019 with nausea vomiting and abdominal pain found to have fluid collection in R hemipelvis. Started on vanc/zosyn. Surgery consulted, s/p ex lap, washout and pelvic abscess drainage 1/25. Post-op course complicated by septic shock secondary to MRSA bacteremia for which she received 2 weeks of IV vancomycin and briefly required pressors.  Wound culture also grew Bacteroides and patient completed 4 weeks of Cipro and Flagyl.  Patient remained intubated postoperatively and was successfully extubated on 01/28.  Initially, she was stabilized and transferred to the floor.  She failed speech language therapy and was started on TPN with subsequent placement of IR guided G-tube in transition to tube feeds on 02/10.  On the floor, patient was making some progress and being followed by [facility]; however, a rapid response was called on 02/19 due to somnolence.  ABG was consistent with acute hypercapnic respiratory failure, and she was placed on BiPAP and transferred back to SICU.  Infectious workup was significant for a UA with 39 WBCs, moderate bacteria, and moderate yeast; urine culture ultimately grew Candida for which she was started on Diflucan IV.  She was also found to have  markedly elevated alk-phos, AST, and ALT.  Hepatology was consulted and felt acute liver injury related to infection versus medication, and not underlying liver disease. And ammonia was checked and was elevated, but hepatology recommended against treatment as they felt patient did not have hepatic encephalopathy.  Right upper quadrant ultrasound showed biliary sludge and gallbladder thickening but was negative for acute cholecystitis.  A subsequent HIDA scan was not consistent with acute cholecystitis; no surgical intervention needed per General surgery evaluation.  Patient's mental status improved in the ICU and is now on nasal cannula oxygen and BiPAP at night.  Medicine initially consulted for assistance with management and noted patient to be markedly fluid overloaded, estimated to be approximately net positive 16 L with this admission.  She had elevated CVP and TTE was consistent volume overload as well as her BNP of > 4900.  She was started on IV diuresis with Lasix with improvement in urine output.  She is to be stepped down to Hospital Medicine for further management and ultimately skilled nursing facility placement.    SUBJECTIVE:     Patient seen in the AM approx 930am reported use of BIPAP only 1hr overnight, patient is awake oriented to self, hospital, somnolent, intermittently follows commands.  Daughter at bedside,    Daughter upset reports she found patient with dried stool all over bed and patient when she arrived earlier this AM.     Later in afternoon reported by nursing that patient hypotensive, tachycardic, rapid response initiated,  BP systolic 70-68.  Critical Care consult placed and they were called to bedside by RRT, patient will transfer to MICU for concerns of new onsent sepsis/septic shock given rising leukocytosis to 21, mental status changes.     ROS (Positive in Bold, otherwise negative)  Pain Scale: 0 /10   Constitutional: fever, chills, night sweats  CV: chest pain, edema,  palpitations  Resp: SOB, cough, sputum production  GI: changes in appetite, NVDC, pain, melena, hematochezia, GERD, hematemesis  : Dysuria, hematuria, urinary urgency, frequency  MSK: arthralgia/myalgia, joint swelling  SKIN: rashes, pruritis, petechiae   Neuro/Psych: FND, anxiety, depression      OBJECTIVE:     Vitals:  Temp:  [97.7 °F (36.5 °C)-100 °F (37.8 °C)]   Pulse:  []   Resp:  [18-34]   BP: ()/(44-88)   SpO2:  [90 %-100 %]      I & O (Last 24H):     Intake/Output Summary (Last 24 hours) at 3/8/2019 2007  Last data filed at 3/8/2019 1900  Gross per 24 hour   Intake 540 ml   Output 175 ml   Net 365 ml       GEN:  In no acute distress. Nontoxic. Resting in bed. Cooperative.  HEENT: NCAT. PERRL. EOMI. Conjunctivae/corneas clear, sclera Anicteric.  CVS: RRR. Normal s1 s2 no murmur, click, rub or gallop  LUNG: CTAB. Normal respiratory effort. Decreased breath sounds bilaterally   ABD: Normoactive BS, soft, NT, ND, no masses or organomegaly.  EXT: No edema. No cyanosis. Full ROM.  SKIN: color, texture, turgor normal. No rashes or lesions  NEURO: Alert, oriented x 3, Spont mvt of all extremities with no focal deficits noted.      All recent labs and imaging has been reviewed.     Recent Results (from the past 24 hour(s))   POCT glucose    Collection Time: 03/07/19 10:58 PM   Result Value Ref Range    POCT Glucose 112 (H) 70 - 110 mg/dL   Comprehensive Metabolic Panel (CMP)    Collection Time: 03/08/19  8:22 AM   Result Value Ref Range    Sodium 131 (L) 136 - 145 mmol/L    Potassium 4.5 3.5 - 5.1 mmol/L    Chloride 91 (L) 95 - 110 mmol/L    CO2 30 (H) 23 - 29 mmol/L    Glucose 132 (H) 70 - 110 mg/dL    BUN, Bld 28 (H) 8 - 23 mg/dL    Creatinine 0.7 0.5 - 1.4 mg/dL    Calcium 10.4 8.7 - 10.5 mg/dL    Total Protein 7.3 6.0 - 8.4 g/dL    Albumin 2.4 (L) 3.5 - 5.2 g/dL    Total Bilirubin 4.1 (H) 0.1 - 1.0 mg/dL    Alkaline Phosphatase 1,343 (H) 55 - 135 U/L     (H) 10 - 40 U/L    ALT 95 (H) 10 - 44  U/L    Anion Gap 10 8 - 16 mmol/L    eGFR if African American >60.0 >60 mL/min/1.73 m^2    eGFR if non African American >60.0 >60 mL/min/1.73 m^2   CBC auto differential    Collection Time: 03/08/19  8:22 AM   Result Value Ref Range    WBC 21.71 (H) 3.90 - 12.70 K/uL    RBC 3.61 (L) 4.00 - 5.40 M/uL    Hemoglobin 11.3 (L) 12.0 - 16.0 g/dL    Hematocrit 38.6 37.0 - 48.5 %     (H) 82 - 98 fL    MCH 31.3 (H) 27.0 - 31.0 pg    MCHC 29.3 (L) 32.0 - 36.0 g/dL    RDW 18.6 (H) 11.5 - 14.5 %    Platelets 243 150 - 350 K/uL    MPV 12.1 9.2 - 12.9 fL    Immature Granulocytes 0.6 (H) 0.0 - 0.5 %    Gran # (ANC) 15.2 (H) 1.8 - 7.7 K/uL    Immature Grans (Abs) 0.13 (H) 0.00 - 0.04 K/uL    Lymph # 4.4 1.0 - 4.8 K/uL    Mono # 1.5 (H) 0.3 - 1.0 K/uL    Eos # 0.4 0.0 - 0.5 K/uL    Baso # 0.11 0.00 - 0.20 K/uL    nRBC 0 0 /100 WBC    Gran% 70.1 38.0 - 73.0 %    Lymph% 20.2 18.0 - 48.0 %    Mono% 6.9 4.0 - 15.0 %    Eosinophil% 1.7 0.0 - 8.0 %    Basophil% 0.5 0.0 - 1.9 %    Differential Method Automated    Magnesium    Collection Time: 03/08/19  8:22 AM   Result Value Ref Range    Magnesium 2.1 1.6 - 2.6 mg/dL   Phosphorus    Collection Time: 03/08/19  8:22 AM   Result Value Ref Range    Phosphorus 2.8 2.7 - 4.5 mg/dL   ISTAT PROCEDURE    Collection Time: 03/08/19 12:48 PM   Result Value Ref Range    POC PH 7.478 (H) 7.35 - 7.45    POC PCO2 45.9 (H) 35 - 45 mmHg    POC PO2 78 (L) 80 - 100 mmHg    POC HCO3 34.0 (H) 24 - 28 mmol/L    POC BE 11 -2 to 2 mmol/L    POC SATURATED O2 96 95 - 100 %    POC Lactate 1.94 (H) 0.36 - 1.25 mmol/L    POC TCO2 35 (H) 23 - 27 mmol/L    Rate 24     Sample ARTERIAL     Site RR     Allens Test Pass     DelSys Nasal Can     Mode SPONT     Flow 2     FiO2 28     Sp02 95    POCT glucose    Collection Time: 03/08/19 12:51 PM   Result Value Ref Range    POCT Glucose 210 (H) 70 - 110 mg/dL   Comprehensive metabolic panel    Collection Time: 03/08/19  1:23 PM   Result Value Ref Range    Sodium 133 (L)  136 - 145 mmol/L    Potassium 4.0 3.5 - 5.1 mmol/L    Chloride 92 (L) 95 - 110 mmol/L    CO2 32 (H) 23 - 29 mmol/L    Glucose 190 (H) 70 - 110 mg/dL    BUN, Bld 34 (H) 8 - 23 mg/dL    Creatinine 0.8 0.5 - 1.4 mg/dL    Calcium 9.7 8.7 - 10.5 mg/dL    Total Protein 6.8 6.0 - 8.4 g/dL    Albumin 2.2 (L) 3.5 - 5.2 g/dL    Total Bilirubin 4.2 (H) 0.1 - 1.0 mg/dL    Alkaline Phosphatase 1,257 (H) 55 - 135 U/L     (H) 10 - 40 U/L    ALT 80 (H) 10 - 44 U/L    Anion Gap 9 8 - 16 mmol/L    eGFR if African American >60.0 >60 mL/min/1.73 m^2    eGFR if non African American >60.0 >60 mL/min/1.73 m^2   Magnesium    Collection Time: 03/08/19  1:23 PM   Result Value Ref Range    Magnesium 2.0 1.6 - 2.6 mg/dL   Phosphorus    Collection Time: 03/08/19  1:23 PM   Result Value Ref Range    Phosphorus 2.6 (L) 2.7 - 4.5 mg/dL   Troponin I    Collection Time: 03/08/19  1:23 PM   Result Value Ref Range    Troponin I 0.040 (H) 0.000 - 0.026 ng/mL   Complete Blood Count    Collection Time: 03/08/19  1:23 PM   Result Value Ref Range    WBC 27.75 (H) 3.90 - 12.70 K/uL    RBC 3.19 (L) 4.00 - 5.40 M/uL    Hemoglobin 9.9 (L) 12.0 - 16.0 g/dL    Hematocrit 34.0 (L) 37.0 - 48.5 %     (H) 82 - 98 fL    MCH 31.0 27.0 - 31.0 pg    MCHC 29.1 (L) 32.0 - 36.0 g/dL    RDW 18.6 (H) 11.5 - 14.5 %    Platelets 240 150 - 350 K/uL    MPV 11.8 9.2 - 12.9 fL    Immature Granulocytes 0.8 (H) 0.0 - 0.5 %    Gran # (ANC) 21.0 (H) 1.8 - 7.7 K/uL    Immature Grans (Abs) 0.23 (H) 0.00 - 0.04 K/uL    Lymph # 3.8 1.0 - 4.8 K/uL    Mono # 2.4 (H) 0.3 - 1.0 K/uL    Eos # 0.3 0.0 - 0.5 K/uL    Baso # 0.11 0.00 - 0.20 K/uL    nRBC 0 0 /100 WBC    Gran% 75.7 (H) 38.0 - 73.0 %    Lymph% 13.5 (L) 18.0 - 48.0 %    Mono% 8.5 4.0 - 15.0 %    Eosinophil% 1.1 0.0 - 8.0 %    Basophil% 0.4 0.0 - 1.9 %    Platelet Estimate Appears normal     Aniso Slight     Poik Slight     Poly Occasional     Tear Drop Cells Occasional     Basophilic Stippling Occasional      Differential Method Automated    Troponin I    Collection Time: 03/08/19  1:23 PM   Result Value Ref Range    Troponin I 0.041 (H) 0.000 - 0.026 ng/mL   Brain natriuretic peptide    Collection Time: 03/08/19  1:23 PM   Result Value Ref Range     (H) 0 - 99 pg/mL   Ammonia    Collection Time: 03/08/19  1:46 PM   Result Value Ref Range    Ammonia 41 10 - 50 umol/L   Urinalysis    Collection Time: 03/08/19  5:15 PM   Result Value Ref Range    Specimen UA Urine, Catheterized     Color, UA Amanda Yellow, Straw, Amanda    Appearance, UA Cloudy (A) Clear    pH, UA 5.0 5.0 - 8.0    Specific Gravity, UA 1.020 1.005 - 1.030    Protein, UA 1+ (A) Negative    Glucose, UA Negative Negative    Ketones, UA Negative Negative    Bilirubin (UA) Negative Negative    Occult Blood UA 3+ (A) Negative    Nitrite, UA Negative Negative    Leukocytes, UA Trace (A) Negative   Urinalysis Microscopic    Collection Time: 03/08/19  5:15 PM   Result Value Ref Range    RBC, UA >100 (H) 0 - 4 /hpf    WBC, UA 10 (H) 0 - 5 /hpf    Bacteria, UA Rare None-Occ /hpf    Squam Epithel, UA 0 /hpf    Hyaline Casts, UA 1 0-1/lpf /lpf    Microscopic Comment SEE COMMENT    POCT glucose    Collection Time: 03/08/19  5:43 PM   Result Value Ref Range    POCT Glucose 253 (H) 70 - 110 mg/dL       Recent Labs   Lab 03/07/19  0531 03/07/19  1039 03/07/19  1517 03/07/19  2258 03/08/19  1251 03/08/19  1743   POCTGLUCOSE 120* 157* 260* 112* 210* 253*       Hemoglobin A1C   Date Value Ref Range Status   01/24/2019 4.7 4.0 - 5.6 % Final     Comment:     ADA Screening Guidelines:  5.7-6.4%  Consistent with prediabetes  >or=6.5%  Consistent with diabetes  High levels of fetal hemoglobin interfere with the HbA1C  assay. Heterozygous hemoglobin variants (HbS, HgC, etc)do  not significantly interfere with this assay.   However, presence of multiple variants may affect accuracy.     12/13/2018 5.2 4.0 - 5.6 % Final     Comment:     ADA Screening Guidelines:  5.7-6.4%   Consistent with prediabetes  >or=6.5%  Consistent with diabetes  High levels of fetal hemoglobin interfere with the HbA1C  assay. Heterozygous hemoglobin variants (HbS, HgC, etc)do  not significantly interfere with this assay.   However, presence of multiple variants may affect accuracy.     10/04/2018 7.1 (H) 4.0 - 5.6 % Final     Comment:     ADA Screening Guidelines:  5.7-6.4%  Consistent with prediabetes  >or=6.5%  Consistent with diabetes  High levels of fetal hemoglobin interfere with the HbA1C  assay. Heterozygous hemoglobin variants (HbS, HgC, etc)do  not significantly interfere with this assay.   However, presence of multiple variants may affect accuracy.          Active Hospital Problems    Diagnosis  POA    *Septic shock [A41.9, R65.21]  Unknown    Chronic respiratory failure with hypoxia and hypercapnia [J96.11, J96.12]  Unknown    Hypomagnesemia [E83.42]  Yes    Hypophosphatemia [E83.39]  Yes    Other specified anemias [D64.89]  Yes    Hypokalemia [E87.6]  Yes    Generalized abdominal pain [R10.84]  Yes    Bradycardia [R00.1]  No    Acute respiratory failure with hypoxia and hypercarbia [J96.01, J96.02]  No    Abnormal thyroid function test [R94.6]  Yes    Urinary incontinence without sensory awareness [N39.42]  Yes    Elevated liver enzymes [R74.8]  No    On enteral nutrition [Z78.9]  No    Alteration in skin integrity due to moisture [R23.9]  Yes    Dysphagia [R13.10]  No    Multiple skin tears [T14.8XXA]  Yes    Alteration in skin integrity related to surgical incision [R23.9]  Yes    Idioventricular rhythm [I44.2]  Yes    Depression [F32.9]  Yes    Pelvic abscess in female [N73.9]  Yes    Debility [R53.81]  Yes    Acute metabolic encephalopathy [G93.41]  Yes    Severe malnutrition [E43]  Yes    Moderate asthma without complication [J45.909]  Yes    Acute on chronic diastolic (congestive) heart failure [I50.33]  Yes    Type 2 diabetes mellitus without complication, without  long-term current use of insulin [E11.9]  Yes    CVA, old, hemiparesis [I69.359]  Not Applicable      Resolved Hospital Problems    Diagnosis Date Resolved POA    Acute cystitis [N30.00] 02/26/2019 Yes    Hypophosphatemia [E83.39] 02/21/2019 No    Severe sepsis [A41.9, R65.20] 02/02/2019 No    Acute renal failure superimposed on stage 3 chronic kidney disease [N17.9, N18.3] 02/24/2019 Yes    Hypomagnesemia [E83.42] 01/25/2019 Yes    Essential hypertension [I10] 02/20/2019 Yes          ASSESSMENT AND PLAN:     Septic shock  -new development  -will transfer to Critical Care MICU  -Started on Vasopressor on the floor  -Charisse [facility] Nora Ordered   -sepsis bolus pending   -Lactic acid levels and repeat labs sent.       Acute on chronic diastolic (congestive) heart failure - resolved     Patient's mental status improved in the ICU and is now on nasal cannula oxygen and BiPAP at night.  Medicine initially consulted 2/22 for assistance with management and noted patient to be markedly fluid overloaded, estimated to be approximately net positive 16 L with this admission.  Weight had increased from 71-75kg to 87kg.   She had elevated CVP and TTE was consistent volume overload as well as her BNP of > 4900.     2/22 IV diuresis with Lasix 40 bid  with improvement in urine output.  3/2 weight down to 73kg which is near her dry weight per epic but BNP was 2600;   -3/5 switched to PO lasix as her weight is now 66kg and exam is improved.  DTR states that patient at home is prone to dehydration with lasix so it is not used daily.  - Lasix PO every other day for now      Generalized abdominal pain     Due to recent Pelvic abscess in female  CT abdomen showed a 4 x 3 cm enhancing fluid collection consistent with abscess.  She also had elevated lactate of 6.8.  She was admitted to the STICU service (Dr. Laurent) for initial management. Patient taken to OR on 01/25 for ex lap with drainage of abscess.  Postoperative course complicated  by septic shock secondary to MRSA bacteremia for which she received 2 weeks of IV vancomycin and briefly required pressors, which have now been weaned off.  Wound culture also grew Bacteroides and patient completed 4 weeks of Cipro and Flagyl.  Patient remained intubated postoperatively and was successfully extubated on 01/28.  Initially, she was stabilized and transferred to the floor.     On the floor, patient was making some progress and being followed by [facility]; however, a rapid response was called on 02/19 due to somnolence.  ABG was consistent with acute hypercapnic respiratory failure, and she was placed on BiPAP and transferred back to SICU.  Infectious workup was significant for a UA with 39 WBCs, moderate bacteria, and moderate yeast; urine culture ultimately grew Candida for which she was started on Diflucan IV.    3/3 surgery looked at wounds and deferred to wound care  - continue to monitor off abx as per ID, leukocytosis stable      Debility     Mostly wheelchair bound, but able to work with [facility] using walker  - Long-term acute care facility -abdominal wound requiring dressing, needs intensive PT, on tube feeds requiring extensive SLP.         Dysphagia     She failed speech language therapy and was started on TPN with subsequent placement of IR guided G-tube in transition to tube feeds on 02/10. Surgery replaced tube 3/2 when pt pulled it out.  - c/w tube feeds  - SLP seen patient 3/7 and rec strict NPO         Elevated liver enzymes - improving      She was also found to have markedly elevated alk-phos, AST, and ALT.  Hepatology was consulted and felt acute liver injury related to infection versus medication, and not underlying liver disease. And ammonia was checked and was elevated, but hepatology recommended against treatment as they felt patient did not have hepatic encephalopathy.  Right upper quadrant ultrasound showed biliary sludge and gallbladder thickening but was negative for  acute cholecystitis.  A subsequent HIDA scan was not consistent with acute cholecystitis; no surgical intervention needed per General surgery evaluation.    - re-consulted hepatology 2/27 who thought this could be drug induced.  DO not use cipro.  Recommend a trial of ursodiol 600mg BID to help with cholestasis with peak Bili of 12.  If not better in two weeks may need biopsy.   - diarrhea may be bile induced, monitor for improvement and consider cholestyramine if not better      Bradycardia - resolved     Around 2/22 pt was in junctional augusto. Cards saw pt and recommended that rate control meds (diltiazem) be held.  Augusto resolved.   - monitor rate       Acute respiratory failure with hypoxia and hypercarbia - resolved  Moderate asthma without complication     Prone to hypercapnic narcosis. Steroid dependant asthma.  - wean o2  - bipap qhs and prn for AMS   - c/w daily breo, theophylline, PRN duonebs      Type 2 diabetes mellitus without complication, without long-term current use of insulin     -- acceptable; continue with current treatment & monitor           Hypomagnesemia - resolved     Replace prn         Severe malnutrition     - dietary consulted and appreciate recs; TF         Coronary artery disease involving native coronary artery of native heart without angina pectoris     - c/w ASA, plavix, statin  - no anginal equivalent           Prophylaxis- Hep TID    Code Status- Full Code     Discharge plan and follow up - LTAC vs SNF, pending          Rich Stokes M.D.  Attending Physician  [facility] Medicine Dept.  Pager: [phone]  Spectralink -[phone]

## 2019-03-09 NOTE — ASSESSMENT & PLAN NOTE
"Pt is a 62 yo female with HFpEF, COPD, CAD, T2DM, CVA with right-sided residual deficits, admitted with abdominal pain, found to have intra-abdominal abscess (s/p ex lap with drainage on 1/25).  Pt's hospital course has been complicated by septic shock secondary to MRSA bacteremia for which she received 2 weeks of IV vancomycin and briefly required pressors.  Wound culture also grew Bacteroides and patient completed 4 weeks of Cipro and Flagyl.  On 3/8, pt noted to be hypotensive and tachycardic, so Critical Care consulted and pt started on vasopressor support.  Pt also with Tmax of 100 F and leukocytosis of 22k.    Plan  - Concern for intra-abdominal source given hx of pelvic abscess and abdominal tenderness on exam  - CXR with "right basal edema versus infiltrate and right pleural fluid possibly pneumonia"  - UA with trace leukocytes, UCx pending  - CT abdomen/pelvis with contrast pending  - BCx pending  - Continue vancomycin and Zosyn  - Continue fluconazole 400mg IV q24h  - Continue Levophed for goal MAP >65  - Continue hydrocortisone 100mg IV q8h       "

## 2019-03-09 NOTE — ASSESSMENT & PLAN NOTE
- Pt with elevated LFTs this admission.  - Hepatology was consulted and felt this was secondary to pt's acute illness vs drug-induced  - Enzymes have been trending down  -  / ALT 80 / ALKP 1257 / TBili 4.2 on 3/8  - Continue ursodiol 500mg BID

## 2019-03-09 NOTE — CONSULTS
Placed 18g X 10cm midline in right brachial vein of RUE using realtime ultrasound guidance. Lot#[id number]. Remove on or before 04/07/2019.

## 2019-03-09 NOTE — HPI
Sheryl Martines is a 63 y.o. Female with HFpEF, COPD, CAD, T2DM, previous CVA with right-sided residual deficits, s/p appendectomy in 8/2018 c/b intra-abdominal abscess (s/p ex lap 1/25/19).  Her post-operative course this admission has been complicated by septic shock secondary to MRSA bacteremia for which she received 2 weeks of IV vancomycin and briefly required vasopressors, as well as respiratory failure requiring mechanical ventilation.  Wound culture also grew Bacteroides and patient completed 4 weeks of ciprofloxacin and Flagyl earlier this admission.    On the morning of 3/8, pt noted to be hypotensive (BP 68/44) and tachycardic (), with Tmax 100 F.  Rapid Response called and Critical Care consulted.  Pt started on Levophed and broad-spectrum antibiotics and transferred to the ICU.

## 2019-03-09 NOTE — ASSESSMENT & PLAN NOTE
"Pt is a 62 yo female with HFpEF, COPD, CAD, T2DM, CVA with right-sided residual deficits, admitted with abdominal pain, found to have intra-abdominal abscess (s/p ex lap with drainage on 1/25).  Pt's hospital course has been complicated by septic shock secondary to MRSA bacteremia for which she received 2 weeks of IV vancomycin and briefly required pressors.  Wound culture also grew Bacteroides and patient completed 4 weeks of Cipro and Flagyl.  On 3/8, pt noted to be hypotensive and tachycardic, so Critical Care consulted and pt started on vasopressor support.  Pt also with Tmax of 100 F and leukocytosis of 22k.    Plan  - Concern for intra-abdominal source given hx of pelvic abscess and abdominal tenderness on exam  - CXR with "right basal edema versus infiltrate and right pleural fluid possibly pneumonia"  - UA with trace leukocytes, UCx pending  - CT abdomen/pelvis with contrast on 3/8 showed "Worsening right lower lobe consolidation with areas of internal gas, raising the question of pneumonia with early necrotic change." and "mild wall thickening and hyperemia of the rectum suggesting possible proctitis."  - MAP goal >65, Levophed discontinued on 3/8  - BCx NGTD  - Continue vancomycin and Zosyn for HCAP, de-escalate following 48+ hours negative cultures  - Discontinue fluconazole 400mg IV q24h  - Decrease hydrocortisone from 100mg to 50mg IV q8h   "

## 2019-03-09 NOTE — ASSESSMENT & PLAN NOTE
"- CT A/P with contrast on 3/8 with "Mild wall thickening and hyperemia of the rectum suggesting possible proctitis.  Colonic diverticulosis without diverticulitis."  - Start docusate per G tube daily  "

## 2019-03-09 NOTE — PLAN OF CARE
Patient transferred to [id number], report called to GILBERT Patel. Patient transferred in specialty bed with 2 RN's. Patient placed on tele box, NAD noted during transfer. All belongings with patient. Daughter called with new room #.

## 2019-03-09 NOTE — RESIDENT HANDOFF
Handoff     Primary Team: [facility] CRITICAL CARE MEDICINE Room Number: 20863/[id number] A     Patient Name: Sheryl Martines MRN: [id number]     Date of Birth: [birth year] Allergies: Ace inhibitors; Carvedilol; Ciprofloxacin; Hydralazine analogues; Metformin; Tetracycline; Tetracyclines; Travatan (with benzalkonium) [travoprost (benzalkonium)]; and Travoprost     Age: 63 y.o. Admit Date: 1/23/2019     Sex: female  BMI: Body mass index is 24.87 kg/m².     Code Status: Full Code        Illness Level (current clinical status): Watcher - Yes - Recently in ICU on vasopressors    Reason for Admission: Septic shock    Brief HPI (pertinent PMH and diagnosis or differential diagnosis):   Sheryl Martines is a 63 y.o. Female with HFpEF, COPD, CAD, T2DM, previous CVA with right-sided residual deficits, s/p appendectomy in 8/2018 c/b intra-abdominal abscess (s/p ex lap 1/25/19).  Her post-operative course this admission has been complicated by septic shock secondary to MRSA bacteremia for which she received 2 weeks of IV vancomycin and briefly required vasopressors, as well as respiratory failure requiring mechanical ventilation.  Wound culture also grew Bacteroides and patient completed 4 weeks of ciprofloxacin and Flagyl earlier this admission.     On the morning of 3/8, pt noted to be hypotensive (BP 68/44) and tachycardic (), with Tmax 100 F.  Rapid Response called and Critical Care consulted.  Pt started on Levophed and broad-spectrum antibiotics and transferred to the ICU.    Procedure Date: Left IJ central line on 3/8    Hospital Course (updated, brief assessment by system or problem, significant events):   See Hospital Medicine or Critical Care progress note for extended hospital course summary.    MICU Course:  Pt stepped up to ICU on 3/8.  She was started on Levophed and stress-dose steroids for hypotension in the setting of likely septic shock and broad-spectrum antimicrobial coverage with vancomycin, Zosyn, and fluconazole.   CT abdomen/pelvis with contrast obtained due to history of intra-abdominal abscess, which showed RLL pneumonia and proctitis.  Pt weaned off of vasopressors on 3/9.    Tasks (specific, using if-then statements):  1. Sepsis likely secondary to RLL pneumonia  - Continue vancomycin and Zosyn for HCAP/aspiration PNA and de-escalate as appropriate  - Follow up cultures  - Continue hydrocortisone and de-escalate back to prednisone (pt was on 10mg daily)    2. Chronic respiratory failure and COPD  - Continue oxygen for goal SpO2 88-92%  - Continue BiPAP qHS    3. Dysphagia  - Strict NPO  - Tube feeds  - SLP    4. Elevated liver enzymes  - Continue to monitor  - Continue ursodiol    Contingency Plan (special circumstances anticipated and plan): If hypotensive and not responsive to fluids, call [phone]    Estimated Discharge Date: TBD    Discharge Disposition: Skilled Nursing Facility likely    Mentored By: Dr. Shotry Godinez

## 2019-03-10 PROBLEM — R00.1 BRADYCARDIA: Status: RESOLVED | Noted: 2019-02-22 | Resolved: 2019-03-10

## 2019-03-10 PROBLEM — I50.33 ACUTE ON CHRONIC DIASTOLIC (CONGESTIVE) HEART FAILURE: Status: RESOLVED | Noted: 2018-09-11 | Resolved: 2019-03-10

## 2019-03-10 PROBLEM — J96.01 ACUTE RESPIRATORY FAILURE WITH HYPOXIA AND HYPERCARBIA: Status: RESOLVED | Noted: 2019-02-21 | Resolved: 2019-03-10

## 2019-03-10 PROBLEM — J96.02 ACUTE RESPIRATORY FAILURE WITH HYPOXIA AND HYPERCARBIA: Status: RESOLVED | Noted: 2019-02-21 | Resolved: 2019-03-10

## 2019-03-10 LAB
ALBUMIN SERPL BCP-MCNC: 2.1 G/DL
ALP SERPL-CCNC: 855 U/L
ALT SERPL W/O P-5'-P-CCNC: 47 U/L
ANION GAP SERPL CALC-SCNC: 9 MMOL/L
AST SERPL-CCNC: 40 U/L
BASOPHILS # BLD AUTO: 0.01 K/UL
BASOPHILS NFR BLD: 0.1 %
BILIRUB SERPL-MCNC: 3.2 MG/DL
BUN SERPL-MCNC: 30 MG/DL
CALCIUM SERPL-MCNC: 9.1 MG/DL
CHLORIDE SERPL-SCNC: 98 MMOL/L
CO2 SERPL-SCNC: 28 MMOL/L
CREAT SERPL-MCNC: 0.7 MG/DL
DIFFERENTIAL METHOD: ABNORMAL
EOSINOPHIL # BLD AUTO: 0 K/UL
EOSINOPHIL NFR BLD: 0 %
ERYTHROCYTE [DISTWIDTH] IN BLOOD BY AUTOMATED COUNT: 18.4 %
EST. GFR  (AFRICAN AMERICAN): >60 ML/MIN/1.73 M^2
EST. GFR  (NON AFRICAN AMERICAN): >60 ML/MIN/1.73 M^2
GLUCOSE SERPL-MCNC: 182 MG/DL
HCT VFR BLD AUTO: 29.4 %
HGB BLD-MCNC: 8.6 G/DL
IMM GRANULOCYTES # BLD AUTO: 0.15 K/UL
IMM GRANULOCYTES NFR BLD AUTO: 0.8 %
LYMPHOCYTES # BLD AUTO: 1 K/UL
LYMPHOCYTES NFR BLD: 5.6 %
MAGNESIUM SERPL-MCNC: 1.9 MG/DL
MCH RBC QN AUTO: 31.4 PG
MCHC RBC AUTO-ENTMCNC: 29.3 G/DL
MCV RBC AUTO: 107 FL
MONOCYTES # BLD AUTO: 0.7 K/UL
MONOCYTES NFR BLD: 3.8 %
NEUTROPHILS # BLD AUTO: 16 K/UL
NEUTROPHILS NFR BLD: 89.7 %
NRBC BLD-RTO: 0 /100 WBC
PHOSPHATE SERPL-MCNC: 3.2 MG/DL
PLATELET # BLD AUTO: 242 K/UL
PMV BLD AUTO: 11.7 FL
POCT GLUCOSE: 209 MG/DL (ref 70–110)
POCT GLUCOSE: 214 MG/DL (ref 70–110)
POCT GLUCOSE: 217 MG/DL (ref 70–110)
POCT GLUCOSE: 245 MG/DL (ref 70–110)
POCT GLUCOSE: 248 MG/DL (ref 70–110)
POTASSIUM SERPL-SCNC: 3.2 MMOL/L
PROT SERPL-MCNC: 6.4 G/DL
RBC # BLD AUTO: 2.74 M/UL
SODIUM SERPL-SCNC: 135 MMOL/L
VANCOMYCIN TROUGH SERPL-MCNC: 31.6 UG/ML
WBC # BLD AUTO: 17.85 K/UL

## 2019-03-10 PROCEDURE — 80202 ASSAY OF VANCOMYCIN: CPT

## 2019-03-10 PROCEDURE — 87186 SC STD MICRODIL/AGAR DIL: CPT | Mod: 59

## 2019-03-10 PROCEDURE — 25000242 PHARM REV CODE 250 ALT 637 W/ HCPCS: Performed by: HOSPITALIST

## 2019-03-10 PROCEDURE — 94660 CPAP INITIATION&MGMT: CPT

## 2019-03-10 PROCEDURE — 99233 SBSQ HOSP IP/OBS HIGH 50: CPT | Mod: ,,, | Performed by: HOSPITALIST

## 2019-03-10 PROCEDURE — 36415 COLL VENOUS BLD VENIPUNCTURE: CPT

## 2019-03-10 PROCEDURE — 84100 ASSAY OF PHOSPHORUS: CPT

## 2019-03-10 PROCEDURE — 87070 CULTURE OTHR SPECIMN AEROBIC: CPT

## 2019-03-10 PROCEDURE — 99900035 HC TECH TIME PER 15 MIN (STAT)

## 2019-03-10 PROCEDURE — 99233 PR SUBSEQUENT HOSPITAL CARE,LEVL III: ICD-10-PCS | Mod: ,,, | Performed by: HOSPITALIST

## 2019-03-10 PROCEDURE — 83735 ASSAY OF MAGNESIUM: CPT

## 2019-03-10 PROCEDURE — 25000003 PHARM REV CODE 250: Performed by: STUDENT IN AN ORGANIZED HEALTH CARE EDUCATION/TRAINING PROGRAM

## 2019-03-10 PROCEDURE — 25000003 PHARM REV CODE 250: Performed by: PHYSICIAN ASSISTANT

## 2019-03-10 PROCEDURE — 25000003 PHARM REV CODE 250: Performed by: HOSPITALIST

## 2019-03-10 PROCEDURE — 85025 COMPLETE CBC W/AUTO DIFF WBC: CPT

## 2019-03-10 PROCEDURE — 63600175 PHARM REV CODE 636 W HCPCS: Performed by: STUDENT IN AN ORGANIZED HEALTH CARE EDUCATION/TRAINING PROGRAM

## 2019-03-10 PROCEDURE — 87205 SMEAR GRAM STAIN: CPT

## 2019-03-10 PROCEDURE — 80053 COMPREHEN METABOLIC PANEL: CPT

## 2019-03-10 PROCEDURE — 87077 CULTURE AEROBIC IDENTIFY: CPT | Mod: 59

## 2019-03-10 PROCEDURE — 27000646 HC AEROBIKA DEVICE

## 2019-03-10 PROCEDURE — 94664 DEMO&/EVAL PT USE INHALER: CPT

## 2019-03-10 PROCEDURE — 20600001 HC STEP DOWN PRIVATE ROOM

## 2019-03-10 PROCEDURE — 63600175 PHARM REV CODE 636 W HCPCS: Performed by: HOSPITALIST

## 2019-03-10 PROCEDURE — 94640 AIRWAY INHALATION TREATMENT: CPT

## 2019-03-10 PROCEDURE — 94761 N-INVAS EAR/PLS OXIMETRY MLT: CPT

## 2019-03-10 PROCEDURE — 27000221 HC OXYGEN, UP TO 24 HOURS

## 2019-03-10 PROCEDURE — 25000003 PHARM REV CODE 250: Performed by: INTERNAL MEDICINE

## 2019-03-10 RX ORDER — POTASSIUM CHLORIDE 20 MEQ/15ML
40 SOLUTION ORAL ONCE
Status: COMPLETED | OUTPATIENT
Start: 2019-03-10 | End: 2019-03-10

## 2019-03-10 RX ORDER — PREDNISONE 10 MG/1
10 TABLET ORAL DAILY
Status: DISCONTINUED | OUTPATIENT
Start: 2019-03-12 | End: 2019-03-16 | Stop reason: HOSPADM

## 2019-03-10 RX ORDER — TRAMADOL HYDROCHLORIDE 50 MG/1
50 TABLET ORAL EVERY 6 HOURS PRN
Status: DISCONTINUED | OUTPATIENT
Start: 2019-03-10 | End: 2019-03-16 | Stop reason: HOSPADM

## 2019-03-10 RX ADMIN — THEOPHYLLINE 100.27 MG: 80 SOLUTION ORAL at 09:03

## 2019-03-10 RX ADMIN — DULOXETINE 60 MG: 60 CAPSULE, DELAYED RELEASE ORAL at 09:03

## 2019-03-10 RX ADMIN — HYDROCORTISONE SODIUM SUCCINATE 50 MG: 100 INJECTION, POWDER, FOR SOLUTION INTRAMUSCULAR; INTRAVENOUS at 06:03

## 2019-03-10 RX ADMIN — PIPERACILLIN AND TAZOBACTAM 4.5 G: 4; .5 INJECTION, POWDER, LYOPHILIZED, FOR SOLUTION INTRAVENOUS; PARENTERAL at 05:03

## 2019-03-10 RX ADMIN — TRAMADOL HYDROCHLORIDE 50 MG: 50 TABLET, COATED ORAL at 01:03

## 2019-03-10 RX ADMIN — HEPARIN SODIUM 5000 UNITS: 5000 INJECTION, SOLUTION INTRAVENOUS; SUBCUTANEOUS at 02:03

## 2019-03-10 RX ADMIN — IPRATROPIUM BROMIDE AND ALBUTEROL SULFATE 3 ML: .5; 3 SOLUTION RESPIRATORY (INHALATION) at 11:03

## 2019-03-10 RX ADMIN — INSULIN ASPART 1 UNITS: 100 INJECTION, SOLUTION INTRAVENOUS; SUBCUTANEOUS at 09:03

## 2019-03-10 RX ADMIN — PIPERACILLIN AND TAZOBACTAM 4.5 G: 4; .5 INJECTION, POWDER, LYOPHILIZED, FOR SOLUTION INTRAVENOUS; PARENTERAL at 12:03

## 2019-03-10 RX ADMIN — HEPARIN SODIUM 5000 UNITS: 5000 INJECTION, SOLUTION INTRAVENOUS; SUBCUTANEOUS at 09:03

## 2019-03-10 RX ADMIN — PIPERACILLIN AND TAZOBACTAM 4.5 G: 4; .5 INJECTION, POWDER, LYOPHILIZED, FOR SOLUTION INTRAVENOUS; PARENTERAL at 09:03

## 2019-03-10 RX ADMIN — HEPARIN SODIUM 5000 UNITS: 5000 INJECTION, SOLUTION INTRAVENOUS; SUBCUTANEOUS at 06:03

## 2019-03-10 RX ADMIN — INSULIN ASPART 4 UNITS: 100 INJECTION, SOLUTION INTRAVENOUS; SUBCUTANEOUS at 12:03

## 2019-03-10 RX ADMIN — FLUTICASONE FUROATE AND VILANTEROL TRIFENATATE 1 PUFF: 100; 25 POWDER RESPIRATORY (INHALATION) at 11:03

## 2019-03-10 RX ADMIN — URSODIOL 500 MG: 250 TABLET, FILM COATED ORAL at 09:03

## 2019-03-10 RX ADMIN — CLOPIDOGREL 75 MG: 75 TABLET, FILM COATED ORAL at 09:03

## 2019-03-10 RX ADMIN — ASPIRIN 81 MG CHEWABLE TABLET 81 MG: 81 TABLET CHEWABLE at 09:03

## 2019-03-10 RX ADMIN — URSODIOL 500 MG: 250 TABLET, FILM COATED ORAL at 11:03

## 2019-03-10 RX ADMIN — HYDROCORTISONE SODIUM SUCCINATE 50 MG: 100 INJECTION, POWDER, FOR SOLUTION INTRAMUSCULAR; INTRAVENOUS at 02:03

## 2019-03-10 RX ADMIN — HYDROCORTISONE SODIUM SUCCINATE 25 MG: 100 INJECTION, POWDER, FOR SOLUTION INTRAMUSCULAR; INTRAVENOUS at 09:03

## 2019-03-10 RX ADMIN — IPRATROPIUM BROMIDE AND ALBUTEROL SULFATE 3 ML: .5; 3 SOLUTION RESPIRATORY (INHALATION) at 03:03

## 2019-03-10 RX ADMIN — TRAMADOL HYDROCHLORIDE 50 MG: 50 TABLET, COATED ORAL at 12:03

## 2019-03-10 RX ADMIN — TRAMADOL HYDROCHLORIDE 50 MG: 50 TABLET, COATED ORAL at 10:03

## 2019-03-10 RX ADMIN — IPRATROPIUM BROMIDE AND ALBUTEROL SULFATE 3 ML: .5; 3 SOLUTION RESPIRATORY (INHALATION) at 07:03

## 2019-03-10 RX ADMIN — INSULIN ASPART 4 UNITS: 100 INJECTION, SOLUTION INTRAVENOUS; SUBCUTANEOUS at 07:03

## 2019-03-10 RX ADMIN — LIDOCAINE: 50 OINTMENT TOPICAL at 12:03

## 2019-03-10 RX ADMIN — INSULIN ASPART 2 UNITS: 100 INJECTION, SOLUTION INTRAVENOUS; SUBCUTANEOUS at 03:03

## 2019-03-10 RX ADMIN — PANTOPRAZOLE SODIUM 40 MG: 40 GRANULE, DELAYED RELEASE ORAL at 09:03

## 2019-03-10 RX ADMIN — INSULIN ASPART 4 UNITS: 100 INJECTION, SOLUTION INTRAVENOUS; SUBCUTANEOUS at 05:03

## 2019-03-10 RX ADMIN — DOCUSATE SODIUM 50 MG: 50 CAPSULE, LIQUID FILLED ORAL at 09:03

## 2019-03-10 RX ADMIN — POTASSIUM CHLORIDE 40 MEQ: 1.5 SOLUTION ORAL at 11:03

## 2019-03-10 RX ADMIN — COLLAGENASE SANTYL: 250 OINTMENT TOPICAL at 09:03

## 2019-03-10 RX ADMIN — GABAPENTIN 300 MG: 300 CAPSULE ORAL at 09:03

## 2019-03-10 NOTE — PLAN OF CARE
Problem: Adult Inpatient Plan of Care  Goal: Plan of Care Review  Outcome: Ongoing (interventions implemented as appropriate)  POC reviewed with patient. AAOx3; oriented to self, and location, not to time. Pt c/o of 7-8/10 pain on her knees and arms. Pt stated acetaminophen gave her no relief; MED team A was paged and KOTA Gore ordered Lidocaine ointment, ketorolac one time dose, and Q6 tramadol. Pt had critical vanc trough of 31.6; KOTA gore made aware and ordered to old 9:30 AM dose. BG check Q4 and covered with correction scale. PEG residual 2-5 ml, water bolus given Q4, TF cont. at 45 ml/hr. Oral care done. Call-light within reach, bed alarm on, safety maintained, pt free from falls, pt stated no other concerns at this time. WCTM.

## 2019-03-10 NOTE — PLAN OF CARE
"Problem: Adult Inpatient Plan of Care  Goal: Plan of Care Review  Outcome: Ongoing (interventions implemented as appropriate)   03/09/19 1830   Plan of Care Review   Plan of Care Reviewed With patient;daughter       Pt resting in bed, VSS, alert to self, "hospital" , transfer from ICU, belongings accounted for, daughter at bedside assisting in ADLs, BG check, PEG residual ,30cc, H2O bolus and TF restarted , ice chips and oral swabs for comfort, IV antibiotics initiated, bony prominences padded, sputum cut at bedside for sample, specialty bed in use, pt requested removal of heel boots , denies further needs, call light within reach.       "

## 2019-03-10 NOTE — PROGRESS NOTES
[facility] Medicine ICU Acceptance Note    Date of Admit: 1/23/2019  Date of Transfer / Stepdown: 3/9/2019  ICU team stepping patient down: MICU,   ICU team member giving verbal handoff:Dr. Matthew Quiñones, [id number]  Accepting  team: HOSP Med 1    Brief History of Present Illness:      Sheryl Martines is a 62 y/o lady/man with prolonged complicated hospitalization with multiple ICU transfers who was recently transferred to ICU on 3/8 for acute onset of septic shock.       Hospital/ICU Course:     Per Critical care team   Pt stepped up to ICU on 3/8.  She was started on Levophed and stress-dose steroids for hypotension in the setting of likely septic shock and broad-spectrum antimicrobial coverage with vancomycin, Zosyn, and fluconazole.  CT abdomen/pelvis with contrast obtained due to history of intra-abdominal abscess, which showed RLL pneumonia and proctitis.  Pt weaned off of vasopressors on 3/9.    Patient seen at bedside this afternoon, discussed plan of care with patient and pts' daughter, reviewed CT imaging with them.       Consultants and Procedures:     Consultants:  Critical Care    Procedures:    Triple LUmen CVC placement - Left IJ & Removal   CT Abdomen/Pelvis     Transfer Information:     Diet:  NPO - on tube feedings.     Physical Activity:      To Do / Pending Studies / Follow ups:    1. F/u pending blood culture results   2. Wean Stress dose steroids as patient can tolerate back to 10mg prednisone   3. Wean antibiotics for management of HCAP as appropriate   4. Continue chronic care.     Patient has been accepted by [facility] Medicine Team A, who will assume care of the patient upon arrival to the floor from the ICU. Please contact ICU team with any concerns prior to arrival. Please contact [facility] at [phone] or [phone] (please do NOT leave a voicemail) when patient arrives to the floor.        Rich Stokes M.D.  Attending Physician  [facility] Medicine Dept.  Pager: [phone]  [facility]  [phone]

## 2019-03-11 PROBLEM — Q30.8: Status: ACTIVE | Noted: 2019-03-11

## 2019-03-11 LAB
ALBUMIN SERPL BCP-MCNC: 2.2 G/DL
ALP SERPL-CCNC: 751 U/L
ALT SERPL W/O P-5'-P-CCNC: 48 U/L
ANION GAP SERPL CALC-SCNC: 10 MMOL/L
AST SERPL-CCNC: 51 U/L
BASOPHILS # BLD AUTO: 0.01 K/UL
BASOPHILS NFR BLD: 0.1 %
BILIRUB SERPL-MCNC: 3 MG/DL
BUN SERPL-MCNC: 24 MG/DL
CALCIUM SERPL-MCNC: 9 MG/DL
CHLORIDE SERPL-SCNC: 98 MMOL/L
CO2 SERPL-SCNC: 31 MMOL/L
CREAT SERPL-MCNC: 0.6 MG/DL
DIFFERENTIAL METHOD: ABNORMAL
EOSINOPHIL # BLD AUTO: 0 K/UL
EOSINOPHIL NFR BLD: 0 %
ERYTHROCYTE [DISTWIDTH] IN BLOOD BY AUTOMATED COUNT: 17.9 %
EST. GFR  (AFRICAN AMERICAN): >60 ML/MIN/1.73 M^2
EST. GFR  (NON AFRICAN AMERICAN): >60 ML/MIN/1.73 M^2
GLUCOSE SERPL-MCNC: 200 MG/DL
HCT VFR BLD AUTO: 29.5 %
HGB BLD-MCNC: 8.5 G/DL
IMM GRANULOCYTES # BLD AUTO: 0.06 K/UL
IMM GRANULOCYTES NFR BLD AUTO: 0.4 %
LYMPHOCYTES # BLD AUTO: 1 K/UL
LYMPHOCYTES NFR BLD: 7.1 %
MAGNESIUM SERPL-MCNC: 1.8 MG/DL
MCH RBC QN AUTO: 31.5 PG
MCHC RBC AUTO-ENTMCNC: 28.8 G/DL
MCV RBC AUTO: 109 FL
MONOCYTES # BLD AUTO: 0.7 K/UL
MONOCYTES NFR BLD: 5.1 %
NEUTROPHILS # BLD AUTO: 12.3 K/UL
NEUTROPHILS NFR BLD: 87.3 %
NRBC BLD-RTO: 0 /100 WBC
PHOSPHATE SERPL-MCNC: 2.4 MG/DL
PLATELET # BLD AUTO: 240 K/UL
PMV BLD AUTO: 12 FL
POCT GLUCOSE: 131 MG/DL (ref 70–110)
POCT GLUCOSE: 134 MG/DL (ref 70–110)
POCT GLUCOSE: 154 MG/DL (ref 70–110)
POCT GLUCOSE: 195 MG/DL (ref 70–110)
POCT GLUCOSE: 210 MG/DL (ref 70–110)
POCT GLUCOSE: 216 MG/DL (ref 70–110)
POCT GLUCOSE: 218 MG/DL (ref 70–110)
POTASSIUM SERPL-SCNC: 2.9 MMOL/L
PREALB SERPL-MCNC: 16 MG/DL
PROT SERPL-MCNC: 6.4 G/DL
RBC # BLD AUTO: 2.7 M/UL
SODIUM SERPL-SCNC: 139 MMOL/L
VANCOMYCIN SERPL-MCNC: 14 UG/ML
WBC # BLD AUTO: 14.1 K/UL

## 2019-03-11 PROCEDURE — 20600001 HC STEP DOWN PRIVATE ROOM

## 2019-03-11 PROCEDURE — 84100 ASSAY OF PHOSPHORUS: CPT

## 2019-03-11 PROCEDURE — 97110 THERAPEUTIC EXERCISES: CPT

## 2019-03-11 PROCEDURE — 25000003 PHARM REV CODE 250: Performed by: INTERNAL MEDICINE

## 2019-03-11 PROCEDURE — 27000221 HC OXYGEN, UP TO 24 HOURS

## 2019-03-11 PROCEDURE — 25000003 PHARM REV CODE 250: Performed by: HOSPITALIST

## 2019-03-11 PROCEDURE — 63600175 PHARM REV CODE 636 W HCPCS: Performed by: STUDENT IN AN ORGANIZED HEALTH CARE EDUCATION/TRAINING PROGRAM

## 2019-03-11 PROCEDURE — 25000003 PHARM REV CODE 250: Performed by: PHYSICIAN ASSISTANT

## 2019-03-11 PROCEDURE — 94664 DEMO&/EVAL PT USE INHALER: CPT

## 2019-03-11 PROCEDURE — 25000003 PHARM REV CODE 250: Performed by: STUDENT IN AN ORGANIZED HEALTH CARE EDUCATION/TRAINING PROGRAM

## 2019-03-11 PROCEDURE — 80053 COMPREHEN METABOLIC PANEL: CPT

## 2019-03-11 PROCEDURE — 36415 COLL VENOUS BLD VENIPUNCTURE: CPT

## 2019-03-11 PROCEDURE — 63600175 PHARM REV CODE 636 W HCPCS: Performed by: HOSPITALIST

## 2019-03-11 PROCEDURE — 97535 SELF CARE MNGMENT TRAINING: CPT

## 2019-03-11 PROCEDURE — 80202 ASSAY OF VANCOMYCIN: CPT

## 2019-03-11 PROCEDURE — 97530 THERAPEUTIC ACTIVITIES: CPT

## 2019-03-11 PROCEDURE — 99900035 HC TECH TIME PER 15 MIN (STAT)

## 2019-03-11 PROCEDURE — 94640 AIRWAY INHALATION TREATMENT: CPT

## 2019-03-11 PROCEDURE — 83735 ASSAY OF MAGNESIUM: CPT

## 2019-03-11 PROCEDURE — 25000242 PHARM REV CODE 250 ALT 637 W/ HCPCS: Performed by: HOSPITALIST

## 2019-03-11 PROCEDURE — 84134 ASSAY OF PREALBUMIN: CPT

## 2019-03-11 PROCEDURE — 94761 N-INVAS EAR/PLS OXIMETRY MLT: CPT

## 2019-03-11 PROCEDURE — 85025 COMPLETE CBC W/AUTO DIFF WBC: CPT

## 2019-03-11 RX ORDER — POTASSIUM CHLORIDE 20 MEQ/15ML
60 SOLUTION ORAL 3 TIMES DAILY
Status: COMPLETED | OUTPATIENT
Start: 2019-03-11 | End: 2019-03-11

## 2019-03-11 RX ORDER — GABAPENTIN 100 MG/1
100 CAPSULE ORAL ONCE
Status: COMPLETED | OUTPATIENT
Start: 2019-03-11 | End: 2019-03-11

## 2019-03-11 RX ADMIN — LOPERAMIDE HYDROCHLORIDE 2 MG: 1 SOLUTION ORAL at 08:03

## 2019-03-11 RX ADMIN — URSODIOL 500 MG: 250 TABLET, FILM COATED ORAL at 09:03

## 2019-03-11 RX ADMIN — HYDROCORTISONE SODIUM SUCCINATE 25 MG: 100 INJECTION, POWDER, FOR SOLUTION INTRAMUSCULAR; INTRAVENOUS at 06:03

## 2019-03-11 RX ADMIN — INSULIN ASPART 4 UNITS: 100 INJECTION, SOLUTION INTRAVENOUS; SUBCUTANEOUS at 08:03

## 2019-03-11 RX ADMIN — LOPERAMIDE HYDROCHLORIDE 2 MG: 1 SOLUTION ORAL at 12:03

## 2019-03-11 RX ADMIN — HYDROCORTISONE SODIUM SUCCINATE 25 MG: 100 INJECTION, POWDER, FOR SOLUTION INTRAMUSCULAR; INTRAVENOUS at 02:03

## 2019-03-11 RX ADMIN — ASPIRIN 81 MG CHEWABLE TABLET 81 MG: 81 TABLET CHEWABLE at 09:03

## 2019-03-11 RX ADMIN — THEOPHYLLINE 100.27 MG: 80 SOLUTION ORAL at 09:03

## 2019-03-11 RX ADMIN — DOCUSATE SODIUM 50 MG: 50 CAPSULE, LIQUID FILLED ORAL at 09:03

## 2019-03-11 RX ADMIN — IPRATROPIUM BROMIDE AND ALBUTEROL SULFATE 3 ML: .5; 3 SOLUTION RESPIRATORY (INHALATION) at 11:03

## 2019-03-11 RX ADMIN — CLOPIDOGREL 75 MG: 75 TABLET, FILM COATED ORAL at 09:03

## 2019-03-11 RX ADMIN — FLUTICASONE FUROATE AND VILANTEROL TRIFENATATE 1 PUFF: 100; 25 POWDER RESPIRATORY (INHALATION) at 09:03

## 2019-03-11 RX ADMIN — IPRATROPIUM BROMIDE AND ALBUTEROL SULFATE 3 ML: .5; 3 SOLUTION RESPIRATORY (INHALATION) at 07:03

## 2019-03-11 RX ADMIN — POTASSIUM CHLORIDE 60 MEQ: 20 SOLUTION ORAL at 03:03

## 2019-03-11 RX ADMIN — POTASSIUM CHLORIDE 60 MEQ: 20 SOLUTION ORAL at 09:03

## 2019-03-11 RX ADMIN — GABAPENTIN 100 MG: 100 CAPSULE ORAL at 12:03

## 2019-03-11 RX ADMIN — IPRATROPIUM BROMIDE AND ALBUTEROL SULFATE 3 ML: .5; 3 SOLUTION RESPIRATORY (INHALATION) at 09:03

## 2019-03-11 RX ADMIN — URSODIOL 500 MG: 250 TABLET, FILM COATED ORAL at 10:03

## 2019-03-11 RX ADMIN — HYDROCORTISONE SODIUM SUCCINATE 25 MG: 100 INJECTION, POWDER, FOR SOLUTION INTRAMUSCULAR; INTRAVENOUS at 10:03

## 2019-03-11 RX ADMIN — INSULIN ASPART 2 UNITS: 100 INJECTION, SOLUTION INTRAVENOUS; SUBCUTANEOUS at 02:03

## 2019-03-11 RX ADMIN — GABAPENTIN 300 MG: 300 CAPSULE ORAL at 08:03

## 2019-03-11 RX ADMIN — HEPARIN SODIUM 5000 UNITS: 5000 INJECTION, SOLUTION INTRAVENOUS; SUBCUTANEOUS at 06:03

## 2019-03-11 RX ADMIN — IPRATROPIUM BROMIDE AND ALBUTEROL SULFATE 3 ML: .5; 3 SOLUTION RESPIRATORY (INHALATION) at 03:03

## 2019-03-11 RX ADMIN — DULOXETINE 60 MG: 60 CAPSULE, DELAYED RELEASE ORAL at 09:03

## 2019-03-11 RX ADMIN — PIPERACILLIN AND TAZOBACTAM 4.5 G: 4; .5 INJECTION, POWDER, LYOPHILIZED, FOR SOLUTION INTRAVENOUS; PARENTERAL at 06:03

## 2019-03-11 RX ADMIN — HEPARIN SODIUM 5000 UNITS: 5000 INJECTION, SOLUTION INTRAVENOUS; SUBCUTANEOUS at 10:03

## 2019-03-11 RX ADMIN — TRAMADOL HYDROCHLORIDE 50 MG: 50 TABLET, COATED ORAL at 09:03

## 2019-03-11 RX ADMIN — INSULIN ASPART 4 UNITS: 100 INJECTION, SOLUTION INTRAVENOUS; SUBCUTANEOUS at 06:03

## 2019-03-11 RX ADMIN — TRAMADOL HYDROCHLORIDE 50 MG: 50 TABLET, COATED ORAL at 08:03

## 2019-03-11 RX ADMIN — PIPERACILLIN AND TAZOBACTAM 4.5 G: 4; .5 INJECTION, POWDER, LYOPHILIZED, FOR SOLUTION INTRAVENOUS; PARENTERAL at 09:03

## 2019-03-11 RX ADMIN — COLLAGENASE SANTYL: 250 OINTMENT TOPICAL at 09:03

## 2019-03-11 RX ADMIN — PANTOPRAZOLE SODIUM 40 MG: 40 GRANULE, DELAYED RELEASE ORAL at 09:03

## 2019-03-11 RX ADMIN — HEPARIN SODIUM 5000 UNITS: 5000 INJECTION, SOLUTION INTRAVENOUS; SUBCUTANEOUS at 02:03

## 2019-03-11 RX ADMIN — PIPERACILLIN AND TAZOBACTAM 4.5 G: 4; .5 INJECTION, POWDER, LYOPHILIZED, FOR SOLUTION INTRAVENOUS; PARENTERAL at 01:03

## 2019-03-11 NOTE — PLAN OF CARE
Problem: Occupational Therapy Goal  Goal: Occupational Therapy Goal  Goals to be met by: 3/12/19    Patient will increase functional independence with ADLs by performing:    Feeding with Set-up Assistance.  UE Dressing with Set-up Assistance.  LE Dressing with Moderate Assistance with AD as needed.  Grooming while seated with Set-up Assistance.  Toileting from bedside commode with Maximum Assistance for hygiene and clothing management.   Toilet transfer to bedside commode with Maximum Assistance.  Sitting EOB ~10 min with min A        Outcome: Ongoing (interventions implemented as appropriate)  Pt progressing well towards goals regarding grooming in bed. Continue OT as tolerated.  Annamarie Wang OT  3/11/2019

## 2019-03-11 NOTE — PROGRESS NOTES
[facility] Hospital Medicine                                                                     Progress Note     Team: [facility] HOSP MED A Rihc Stokes MD   Admit Date: 1/23/2019   Hospital Day: 45  TAMIKO: 3/11/2019   Code status: Full Code   Principal Problem: Septic shock     SUMMARY:     Patient is a 63 y.o. with chronic diastolic heart failure, COPD on home oxygen at 2 liters, chronic debility, CAD, Type 2 diabetes, HTN, previous CVA with residual right sided weakness, prior appendectomy in 8/2018 complicated by C. Diff infection, failure to thrive admitted to the hospital on 1/23/2019 with nausea vomiting and abdominal pain found to have fluid collection in R hemipelvis. Started on vanc/zosyn. Surgery consulted, s/p ex lap, washout and pelvic abscess drainage 1/25. Post-op course complicated by septic shock secondary to MRSA bacteremia for which she received 2 weeks of IV vancomycin and briefly required pressors.  Wound culture also grew Bacteroides and patient completed 4 weeks of Cipro and Flagyl.  Patient remained intubated postoperatively and was successfully extubated on 01/28.  Initially, she was stabilized and transferred to the floor.  She failed speech language therapy and was started on TPN with subsequent placement of IR guided G-tube in transition to tube feeds on 02/10.  On the floor, patient was making some progress and being followed by [facility]; however, a rapid response was called on 02/19 due to somnolence.  ABG was consistent with acute hypercapnic respiratory failure, and she was placed on BiPAP and transferred back to SICU.  Infectious workup was significant for a UA with 39 WBCs, moderate bacteria, and moderate yeast; urine culture ultimately grew Candida for which she was started on Diflucan IV.  She was also found to have markedly  elevated alk-phos, AST, and ALT.  Hepatology was consulted and felt acute liver injury related to infection versus medication, and not underlying liver disease. And ammonia was checked and was elevated, but hepatology recommended against treatment as they felt patient did not have hepatic encephalopathy.  Right upper quadrant ultrasound showed biliary sludge and gallbladder thickening but was negative for acute cholecystitis.  A subsequent HIDA scan was not consistent with acute cholecystitis; no surgical intervention needed per General surgery evaluation.  Patient's mental status improved in the ICU and is now on nasal cannula oxygen and BiPAP at night.  Medicine initially consulted for assistance with management and noted patient to be markedly fluid overloaded, estimated to be approximately net positive 16 L with this admission.  She had elevated CVP and TTE was consistent volume overload as well as her BNP of > 4900.  She was started on IV diuresis with Lasix with improvement in urine output.  She is to be stepped down to Hospital Medicine for further management and ultimately skilled nursing facility placement.    3/9 - Transfer back to MICU for SEptic Shock requiring vasopressor support - etiology is RLL Pneumonia noted on CT Abdomen/pelvis, no new intraabdominal source of infection    SUBJECTIVE:           Review of Systems   Constitutional: Negative for chills and fever.   HENT: Negative for sinus pain and sore throat.    Eyes: Negative for blurred vision.   Respiratory: Positive for cough. Negative for wheezing.    Cardiovascular: Negative for chest pain, palpitations and orthopnea.   Gastrointestinal: Negative for abdominal pain, heartburn and vomiting.   Genitourinary: Negative for dysuria.   Musculoskeletal: Negative for myalgias.   Neurological: Positive for weakness. Negative for dizziness and headaches.         OBJECTIVE:     Vitals:  Temp:  [97.6 °F (36.4 °C)-98.4 °F (36.9 °C)]   Pulse:  [80-98]    Resp:  [16-20]   BP: (131-155)/(61-74)   SpO2:  [93 %-100 %]      I & O (Last 24H):     Intake/Output Summary (Last 24 hours) at 3/10/2019 2003  Last data filed at 3/10/2019 1800  Gross per 24 hour   Intake 1540 ml   Output 1300 ml   Net 240 ml       Physical Exam   Constitutional: She is oriented to person, place, and time. No distress.   HENT:   Mouth/Throat: Oropharynx is clear and moist.   Eyes: Conjunctivae are normal. No scleral icterus.   Neck: No JVD present.   Cardiovascular: Normal rate and regular rhythm.   Murmur heard.  Pulmonary/Chest: Effort normal. No respiratory distress. She has no wheezes.   Decreased Right Mid-Basilar Breath sounds  2L nasal cannula   Abdominal: Soft. Bowel sounds are normal. There is no guarding.   Lymphadenopathy:     She has no cervical adenopathy.   Neurological: She is alert and oriented to person, place, and time.   Skin: Skin is warm and dry.   Psychiatric: She has a normal mood and affect. Her behavior is normal. Thought content normal.       All recent labs and imaging has been reviewed.     Recent Results (from the past 24 hour(s))   POCT glucose    Collection Time: 03/09/19 10:13 PM   Result Value Ref Range    POCT Glucose 179 (H) 70 - 110 mg/dL   Culture, Respiratory with Gram Stain    Collection Time: 03/10/19  1:01 AM   Result Value Ref Range    Gram Stain (Respiratory) <10 epithelial cells per low power field.     Gram Stain (Respiratory) Many WBC's     Gram Stain (Respiratory) No organisms seen    POCT glucose    Collection Time: 03/10/19  2:06 AM   Result Value Ref Range    POCT Glucose 217 (H) 70 - 110 mg/dL   Comprehensive Metabolic Panel (CMP)    Collection Time: 03/10/19  5:23 AM   Result Value Ref Range    Sodium 135 (L) 136 - 145 mmol/L    Potassium 3.2 (L) 3.5 - 5.1 mmol/L    Chloride 98 95 - 110 mmol/L    CO2 28 23 - 29 mmol/L    Glucose 182 (H) 70 - 110 mg/dL    BUN, Bld 30 (H) 8 - 23 mg/dL    Creatinine 0.7 0.5 - 1.4 mg/dL    Calcium 9.1 8.7 - 10.5  mg/dL    Total Protein 6.4 6.0 - 8.4 g/dL    Albumin 2.1 (L) 3.5 - 5.2 g/dL    Total Bilirubin 3.2 (H) 0.1 - 1.0 mg/dL    Alkaline Phosphatase 855 (H) 55 - 135 U/L    AST 40 10 - 40 U/L    ALT 47 (H) 10 - 44 U/L    Anion Gap 9 8 - 16 mmol/L    eGFR if African American >60.0 >60 mL/min/1.73 m^2    eGFR if non African American >60.0 >60 mL/min/1.73 m^2   CBC auto differential    Collection Time: 03/10/19  5:23 AM   Result Value Ref Range    WBC 17.85 (H) 3.90 - 12.70 K/uL    RBC 2.74 (L) 4.00 - 5.40 M/uL    Hemoglobin 8.6 (L) 12.0 - 16.0 g/dL    Hematocrit 29.4 (L) 37.0 - 48.5 %     (H) 82 - 98 fL    MCH 31.4 (H) 27.0 - 31.0 pg    MCHC 29.3 (L) 32.0 - 36.0 g/dL    RDW 18.4 (H) 11.5 - 14.5 %    Platelets 242 150 - 350 K/uL    MPV 11.7 9.2 - 12.9 fL    Immature Granulocytes 0.8 (H) 0.0 - 0.5 %    Gran # (ANC) 16.0 (H) 1.8 - 7.7 K/uL    Immature Grans (Abs) 0.15 (H) 0.00 - 0.04 K/uL    Lymph # 1.0 1.0 - 4.8 K/uL    Mono # 0.7 0.3 - 1.0 K/uL    Eos # 0.0 0.0 - 0.5 K/uL    Baso # 0.01 0.00 - 0.20 K/uL    nRBC 0 0 /100 WBC    Gran% 89.7 (H) 38.0 - 73.0 %    Lymph% 5.6 (L) 18.0 - 48.0 %    Mono% 3.8 (L) 4.0 - 15.0 %    Eosinophil% 0.0 0.0 - 8.0 %    Basophil% 0.1 0.0 - 1.9 %    Differential Method Automated    Magnesium    Collection Time: 03/10/19  5:23 AM   Result Value Ref Range    Magnesium 1.9 1.6 - 2.6 mg/dL   Phosphorus    Collection Time: 03/10/19  5:23 AM   Result Value Ref Range    Phosphorus 3.2 2.7 - 4.5 mg/dL   VANCOMYCIN, TROUGH before 4th dose    Collection Time: 03/10/19  5:23 AM   Result Value Ref Range    Vancomycin-Trough 31.6 (HH) 10.0 - 22.0 ug/mL   POCT glucose    Collection Time: 03/10/19  6:05 AM   Result Value Ref Range    POCT Glucose 209 (H) 70 - 110 mg/dL   POCT glucose    Collection Time: 03/10/19  7:46 AM   Result Value Ref Range    POCT Glucose 214 (H) 70 - 110 mg/dL   POCT glucose    Collection Time: 03/10/19 12:50 PM   Result Value Ref Range    POCT Glucose 245 (H) 70 - 110 mg/dL    POCT glucose    Collection Time: 03/10/19  5:44 PM   Result Value Ref Range    POCT Glucose 248 (H) 70 - 110 mg/dL       Recent Labs   Lab 03/09/19  2213 03/10/19  0206 03/10/19  0605 03/10/19  0746 03/10/19  1250 03/10/19  1744   POCTGLUCOSE 179* 217* 209* 214* 245* 248*        Active Hospital Problems    Diagnosis  POA    *Septic shock [A41.9, R65.21]  No     Priority: 1 - High    Right lower lobe pneumonia [J18.1]  No     Priority: 2     Chronic respiratory failure with hypoxia and hypercapnia [J96.11, J96.12]  Yes     Priority: 3     Moderate asthma without complication [J45.909]  Yes     Priority: 3     Pelvic abscess in female [N73.9]  Yes     Priority: 4     On enteral nutrition [Z78.9]  No     Priority: 5     Debility [R53.81]  Yes     Priority: 5     Elevated liver enzymes [R74.8]  No     Priority: 6     Dysphagia [R13.10]  No     Priority: 7     Generalized abdominal pain [R10.84]  Yes     Priority: 8     Alteration in skin integrity due to moisture [R23.9]  Yes     Priority: 8     Multiple skin tears [T14.8XXA]  Yes     Priority: 8     Alteration in skin integrity related to surgical incision [R23.9]  Yes     Priority: 8     CVA, old, hemiparesis [I69.359]  Not Applicable     Priority: 8     Severe malnutrition [E43]  Yes     Priority: 9     Type 2 diabetes mellitus without complication, without long-term current use of insulin [E11.9]  Yes     Priority: 9     Proctitis [K62.89]  No     Priority: 10     Depression [F32.9]  Yes     Priority: 10     Adrenal insufficiency [E27.40]  No     Priority: 11     Other specified anemias [D64.89]  Yes     Priority: 12     Urinary incontinence without sensory awareness [N39.42]  Yes     Priority: 12     Hypomagnesemia [E83.42]  Yes    Hypophosphatemia [E83.39]  Yes    Hypokalemia [E87.6]  Yes    Abnormal thyroid function test [R94.6]  Yes    Coronary artery disease involving native coronary artery of native heart without angina pectoris  [I25.10]  Yes      Resolved Hospital Problems    Diagnosis Date Resolved POA    Bradycardia [R00.1] 03/10/2019 No    Acute respiratory failure with hypoxia and hypercarbia [J96.01, J96.02] 03/10/2019 No    Acute cystitis [N30.00] 02/26/2019 Yes    Idioventricular rhythm [I44.2] 03/10/2019 Yes    Hypophosphatemia [E83.39] 02/21/2019 No    Severe sepsis [A41.9, R65.20] 02/02/2019 No    Acute renal failure superimposed on stage 3 chronic kidney disease [N17.9, N18.3] 02/24/2019 Yes    Hypomagnesemia [E83.42] 01/25/2019 Yes    Acute metabolic encephalopathy [G93.41] 03/10/2019 Yes    Essential hypertension [I10] 02/20/2019 Yes    Acute on chronic diastolic (congestive) heart failure [I50.33] 03/10/2019 Yes          ASSESSMENT AND PLAN:     Septic shock  Right Lower Lobe Pneumonia   -Blood Cultures show No growth  -s/p 1day MICU stay, repeat ICU txfr.   -Wean Stress Dose steroids today thru tomorrow for patient to resume chronic prednisone by 3/12  -Sputum Culture ordered & Pending.   -Vanc & Zosyn Empiric therapy    > Vancomycin Level was supratherapeutic  Holding dosage and PharmD consulted.        Pelvic Abscesses/Generalized abdominal pain  Due to recent Pelvic abscess in female  CT abdomen showed a 4 x 3 cm enhancing fluid collection consistent with abscess.  She also had elevated lactate of 6.8.  She was admitted to the STICU service (Dr. Laurent) for initial management. Patient taken to OR on 01/25 for ex lap with drainage of abscess.  Postoperative course complicated by septic shock secondary to MRSA bacteremia for which she received 2 weeks of IV vancomycin and briefly required pressors, which have now been weaned off.  Wound culture also grew Bacteroides and patient completed 4 weeks of Cipro and Flagyl.  Patient remained intubated postoperatively and was successfully extubated on 01/28.  Initially, she was stabilized and transferred to the floor.     On the floor, patient was making some progress  and being followed by [facility]; however, a rapid response was called on 02/19 due to somnolence.  ABG was consistent with acute hypercapnic respiratory  failure, and she was placed on BiPAP and transferred back to SICU.  Infectious workup was significant for a UA with 39 WBCs, moderate bacteria, and moderate yeast; urine culture ultimately grew Candida for which she was started on Diflucan IV.    3/3 surgery looked at wounds and deferred to wound care    Elevated liver enzymes - improving   She was also found to have markedly elevated alk-phos, AST, and ALT.  Hepatology was consulted and felt acute liver injury related to infection versus medication, and not underlying liver disease. And ammonia was checked and was elevated, but hepatology recommended against treatment as they felt patient did not have hepatic encephalopathy.  Right upper quadrant ultrasound showed biliary sludge and gallbladder thickening but was negative for acute cholecystitis.  A subsequent HIDA scan was not consistent with acute cholecystitis; no surgical intervention needed per General surgery evaluation.    - re-consulted hepatology 2/27 who thought this could be drug induced.  DO not use cipro.  Recommend a trial of ursodiol 600mg BID to help with cholestasis with peak Bili of 12.  If not better in two weeks may need biopsy.   - diarrhea may be bile induced, monitor for improvement and consider cholestyramine if not better      Moderate asthma without complication/ Chronic Hypoxic & Hypercapnic respiratory failure  -Requires Nightly BIPAP   >Prone to hypercapnic narcosis  -Continue q4WAKE - Duo Nebs.    . Steroid dependant asthma - chronically on Prednisone 10mg.   - wean o2  - bipap qhs and prn for AMS   - c/w daily breo, theophylline,     Debility  Mostly wheelchair bound, but able to work with [facility] using walker  - Long-term acute care facility -abdominal wound requiring dressing, needs intensive PT, on tube feeds requiring  extensive SLP.    Dysphagia/Severe Malnutrition   She failed speech language therapy and was started on TPN with subsequent placement of IR guided G-tube in transition to tube feeds on 02/10. Surgery replaced tube 3/2 when pt pulled it out.  - c/w tube feeds  - SLP seen patient 3/7 and rec strict NPO     Coronary artery disease involving native coronary artery of native heart without angina pectoris  - c/w ASA, plavix, statin  - no anginal equivalent    Type 2 diabetes mellitus without complication, without long-term current use of insulin  -- acceptable; continue with current treatment & monitor         Resolved Issues   Acute on chronic diastolic (congestive) heart failure - resolved  Patient's mental status improved in the ICU and is now on nasal cannula oxygen and BiPAP at night.  Medicine initially consulted 2/22 for assistance with management and noted patient to be markedly fluid overloaded, estimated to be approximately net positive 16 L with this admission.  Weight had increased from 71-75kg to 87kg.   She had elevated CVP and TTE was consistent volume overload as well as her BNP of > 4900.     2/22 IV diuresis with Lasix 40 bid  with improvement in urine output.  3/2 weight down to 73kg which is near her dry weight per epic but BNP was 2600;   -3/5 switched to PO lasix as her weight is now 66kg and exam is improved.  DTR states that patient at home is prone to dehydration with lasix so it is not used daily.  - Lasix PO every other day for now    Hypomagnesemia - resolved  Acute respiratory failure with hypoxia and hypercarbia - resolved  Bradycardia - resolved    Prophylaxis- Hep TID    Code Status- Full Code     Discharge plan and follow up - LTAC vs SNF, pending          Rich Stokes M.D.  Attending Physician  [facility] Medicine Dept.  Pager: [phone]  [employer] -[phone]

## 2019-03-11 NOTE — PHYSICIAN QUERY
PT Name: Sheryl Martines  MR #: [id number]     Physician Query Form - Etiology of Condition Clarification      CDS/: JUDY Ge,[email or web address]         Contact information:[email or web address]  This form is a permanent document in the medical record.     Query Date: March 11, 2019    By submitting this query, we are merely seeking further clarification of documentation.  Please utilize your independent clinical judgment when addressing the question(s) below.     The medical record contains the following:    Findings Supporting Clinical Information Location in Medical Record   · The estimated PA systolic pressure is 59 mm Hg. Pulmonary hypertension present           Acute on chronic diastolic (congestive) heart failure    TTE from 2/22/2019 showed  · Low normal left ventricular systolic function. The estimated ejection fraction is 50%  · Grade II (moderate) left ventricular diastolic dysfunction consistent with pseudonormalization.  · Moderate mitral regurgitation.  · Low normal right ventricular systolic function.  · Mild tricuspid regurgitation.  · The estimated PA systolic pressure is 59 mm Hg. Pulmonary hypertension present.  · Elevated central venous pressure (15 mm Hg).  · Severe left atrial enlargement  ·   Medicine initially consulted for assistance with management and noted patient to be markedly fluid overloaded, estimated to be approximately net positive 16 L with this admission.  She had elevated CVP and TTE was consistent volume overload as well as her BNP of > 4900.  She was started on IV diuresis with Lasix with improvement in urine output.  She is to be stepped down to Hospital Medicine for further management and ultimately skilled nursing facility placement    -Pt on Lasix 40mg per G tube every other day  - Holding diuresis in setting of shock H&P 3/8     Please document your best medical opinion regarding the etiology of pulmonary hypertension for which treatment is/was directed.      Provider use only      Group 2 - left heart dz             [  ] Clinically Undetermined     Please document in your progress notes daily for the duration of treatment, until resolved, and include in your discharge summary.

## 2019-03-11 NOTE — PLAN OF CARE
Problem: Adult Inpatient Plan of Care  Goal: Plan of Care Review  Outcome: Ongoing (interventions implemented as appropriate)   03/11/19 6367   Plan of Care Review   Plan of Care Reviewed With patient;daughter       Pt resting in bed, VSS, pt tolerating TF, mobilizing in bed and worked with PT/OT, PRN pain medication provided x 1, 3BMs immodium given per MAR with good results.  POC reviewed with pt and daughter, pt denies further needs at this time, call light within reach.

## 2019-03-11 NOTE — PLAN OF CARE
Problem: Adult Inpatient Plan of Care  Goal: Plan of Care Review  Outcome: Ongoing (interventions implemented as appropriate)  No acute changes tonight. POC reviewed with patient. AAOx3; oriented to self, and location, not to time. Pt c/o of 7-8/10 pain on her knees and arms; PRN pain med given. BG check Q4 and covered with correction scale. PEG residual 2 ml, water bolus given Q4, TF cont. at 45 ml/hr. Oral care done. Pt had 6 urine occurrences, and 4 BMs tonight. Call-light within reach, bed alarm on, safety maintained, pt free from falls, pt stated no other concerns at this time. WCTM.

## 2019-03-11 NOTE — PT/OT/SLP PROGRESS
Occupational Therapy   Treatment    Name: Sheryl Martines  MRN: [id number]  Admitting Diagnosis:  Septic shock  45 Days Post-Op    Recommendations:     Discharge Recommendations: nursing facility, skilled  Discharge Equipment Recommendations:  hospital bed  Barriers to discharge:  Decreased caregiver support    Assessment:     Sheryl Martines is a 63 y.o. female with a medical diagnosis of Septic shock.  She presents with impaired ADLs and functional mobility performance. Pt required max encouragement to participate focused on toileting incontinence, bed mobility, and UB dressing change. Pt tolerated EOB for ~15 minutes. Performance deficits affecting function are impaired endurance, impaired self care skills, impaired functional mobilty, gait instability, impaired balance, impaired cognition, decreased coordination, decreased upper extremity function, decreased lower extremity function, decreased safety awareness. Pt would benefit from continued OT services to increase daily living skills for optimized QOL. Recommended d/c is SNF at this time.     Rehab Prognosis:  Good; patient would benefit from acute skilled OT services to address these deficits and reach maximum level of function.       Plan:     Patient to be seen 3 x/week to address the above listed problems via self-care/home management, therapeutic activities, therapeutic exercises  · Plan of Care Expires: 03/28/19  · Plan of Care Reviewed with: patient    Subjective     Pain/Comfort:  Pain Rating 1: 0/10    Objective:     Communicated with: RN prior to session.  Patient found supine with telemetry, bowel management system, pressure relief boots, peripheral IV, oxygen, PEG Tube upon OT entry to room.    General Precautions: Standard, aspiration, NPO, fall   Orthopedic Precautions:N/A   Braces: N/A     Occupational Performance:     Bed Mobility:    · Patient completed Rolling/Turning to Left with  moderate assistance  · Patient completed Rolling/Turning to  Right with moderate assistance  · Patient completed Scooting/Bridging with maximal assistance and 2 persons  · Patient completed Supine to Sit with maximal assistance  · Patient completed Sit to Supine with maximal assistance     Functional Mobility/Transfers:  · Patient completed Sit <> Stand Transfer with maximal assistance and of 2 persons  with  hand-held assist   · Functional Mobility: Pt attempted sit to stand transfer x2 trials wearing knee hinge brace. Pt presented with LB weakness limiting total stand and required maximal cues.     Activities of Daily Living:  · Upper Body Dressing: minimum assistance donning gown sitting EOB   · Toileting: total assistance with toileting episode on bedpan. Pt required total (A) for toileting hygiene in bed during session.      [facility] 6 Click ADL: 10    Treatment & Education:  Role of OT, POC, and safety during ADLs and functional mobility education. Pt completed bed mobility with mod (A) and ambulation while wearing R knee hinge brace with max x2 (A) for safety. Majority of session was spent doing ADLs related to toileting and toileting hygiene with total (A). Pt completed the following UE exercises, 1x10 sitting EOB:  Shoulder flexion/extension  Elbow flex/ext  Wrist flex/ext      Patient left HOB elevated with all lines intact, call button in reach and bed alarm onEducation:      GOALS:   Multidisciplinary Problems     Occupational Therapy Goals        Problem: Occupational Therapy Goal    Goal Priority Disciplines Outcome Interventions   Occupational Therapy Goal     OT, PT/OT Ongoing (interventions implemented as appropriate)    Description:  Goals to be met by: 3/12/19    Patient will increase functional independence with ADLs by performing:    Feeding with Set-up Assistance.  UE Dressing with Set-up Assistance.  LE Dressing with Moderate Assistance with AD as needed.  Grooming while seated with Set-up Assistance.  Toileting from bedside commode with Maximum Assistance  for hygiene and clothing management.   Toilet transfer to bedside commode with Maximum Assistance.  Sitting EOB ~10 min with min A                         Time Tracking:     OT Date of Treatment: 03/11/19  OT Start Time: 1026  OT Stop Time: 1054  OT Total Time (min): 28 min    Billable Minutes:Self Care/Home Management 14 min  Therapeutic Activity 14 min    Annamarie Wang OT  3/11/2019

## 2019-03-11 NOTE — PLAN OF CARE
[facility] obtained authorization from pt's insurance for LTAC. Pt will likely discharge tomorrow vs Wednesday.     SW left message for daughter Citlaly to inform of this information. SW waiting to hear back.    4:18 PM  SW spoke with pt's daughter Citlaly to inform that pt has been accepted to [facility] for LTAC needs. Citlaly is reluctantly in agreement with placement. SW will inform daughter prior to patient discharging.     Jasmyne Lei, ISSA  [facility]

## 2019-03-11 NOTE — PLAN OF CARE
Problem: Physical Therapy Goal  Goal: Physical Therapy Goal  Goals to be met by:3/25/19    Patient will increase functional independence with mobility by performin. Supine to sit with Moderate Assistance - met  2. Sit to stand transfer with Maximum Assistance - met  3. Bed to chair transfer with Maximum Assistance -not met  4. Sitting at edge of bed x10 minutes with Contact Guard Assistance - not met  5. Lower extremity exercise program x10 reps, with assistance as needed - not met          Outcome: Ongoing (interventions implemented as appropriate)  Progressing towards goals    Renaot Jean PT,DPT  3/11/2019

## 2019-03-11 NOTE — PT/OT/SLP PROGRESS
Physical Therapy Treatment    Patient Name:  Sheryl Martines   MRN:  [id number]    Recommendations:     Discharge Recommendations:  nursing facility, skilled   Discharge Equipment Recommendations: hospital bed   Barriers to discharge: Inaccessible home and Decreased caregiver support    Assessment:     Sheryl Martines is a 63 y.o. female admitted with a medical diagnosis of Septic shock.  She presents with the following impairments/functional limitations:  weakness, impaired functional mobilty, gait instability, impaired endurance, impaired balance, impaired self care skills, decreased lower extremity function, decreased upper extremity function, pain, decreased ROM, decreased safety awareness.  Pt tolerated session c min c/o weakness.  Performed bed mobility c mod A and transfer c max A of 2 person.  Gait training deferred on this date d/t BLE weakness and inability to maintain standing longer than ~5secs.  Pt demo posterior lean and excessive knee/hip flexion in standing.  Pt demo fair/good sitting balance and able to reach minimally outside of [place].  Pt refused transfer to chair on this date d/t pain and frequent urination.  Pt educated on sitting EOB throughout day c assistance from RN - pt safe to transfer to sitting EOB c assistance of 1x person.  Pt would benefit from continued skilled acute PT 3x/wk to improve functional mobility.      Rehab Prognosis: Good; patient would benefit from acute skilled PT services to address these deficits and reach maximum level of function.    Recent Surgery: Procedure(s) (LRB):  LAPAROTOMY, EXPLORATORY (N/A)  INCISION AND DRAINAGE, ABSCESS-  drainage of pelvic abscess (N/A)  EXCISION, ABSCESS- drainage abdominal wall abscess (N/A)  APPLICATION, WOUND VAC (N/A) 45 Days Post-Op    Plan:     During this hospitalization, patient to be seen 3 x/week to address the identified rehab impairments via gait training, therapeutic exercises, therapeutic activities, neuromuscular  "re-education and progress toward the following goals:    · Plan of Care Expires:  03/24/19    Subjective     Chief Complaint: fatigue  Patient/Family Comments/goals: "I don't think I can walk today."  Pain/Comfort:  · Pain Rating 1: 0/10      Objective:     Communicated with RN and OT prior to session.  Patient found R sidelying telemetry, bowel management system, pressure relief boots, peripheral IV, oxygen, PEG Tube  upon PT entry to room.     General Precautions: Standard, aspiration, NPO, fall   Orthopedic Precautions:N/A   Braces: N/A     Functional Mobility:  · Bed Mobility:     · Rolling Left:  moderate assistance  · Rolling Right: moderate assistance  · Scooting: moderate assistance  · Supine to Sit: moderate assistance  · Sit to Supine: moderate assistance  · Transfers:     · Sit to Stand:  maximal assistance and of 2 persons with hand-held assist  · Balance: sitting (S); standing (max A)      AM-PAC 6 CLICK MOBILITY  Turning over in bed (including adjusting bedclothes, sheets and blankets)?: 2  Sitting down on and standing up from a chair with arms (e.g., wheelchair, bedside commode, etc.): 2  Moving from lying on back to sitting on the side of the bed?: 2  Moving to and from a bed to a chair (including a wheelchair)?: 2  Need to walk in hospital room?: 1  Climbing 3-5 steps with a railing?: 1  Basic Mobility Total Score: 10       Therapeutic Activities and Exercises:  Pt educated on: PT role/POC; safety c mobility; benefits of OOB activities; performing therex; d/c recs - v/u  -Bed mobility performed for major-hygiene following BM  -Bedding changed d/t soiling  -sat EOB [id number]  -Sit<>stand 2x from bed in lowest position  -Therex (AP,LAQ,hip flex) 2x10 ea  -functional reach in all available planes x2mins    Patient left HOB elevated with all lines intact, call button in reach and RN notified..    GOALS:   Multidisciplinary Problems     Physical Therapy Goals        Problem: Physical Therapy Goal    Goal " Priority Disciplines Outcome Goal Variances Interventions   Physical Therapy Goal     PT, PT/OT Ongoing (interventions implemented as appropriate)     Description:  Goals to be met by:3/25/19    Patient will increase functional independence with mobility by performin. Supine to sit with Moderate Assistance - met  2. Sit to stand transfer with Maximum Assistance - met  3. Bed to chair transfer with Maximum Assistance -not met  4. Sitting at edge of bed x10 minutes with Contact Guard Assistance - not met  5. Lower extremity exercise program x10 reps, with assistance as needed - not met                            Time Tracking:     PT Received On: 19  PT Start Time: 1025     PT Stop Time: 1054  PT Total Time (min): 29 min     Billable Minutes: Therapeutic Activity 15 min and Therapeutic Exercise 8 min    Treatment Type: Treatment  PT/PTA: PT     PTA Visit Number: 2     Renato Jean PT  2019

## 2019-03-12 LAB
ALBUMIN SERPL BCP-MCNC: 2.3 G/DL
ALP SERPL-CCNC: 737 U/L
ALT SERPL W/O P-5'-P-CCNC: 79 U/L
ANION GAP SERPL CALC-SCNC: 8 MMOL/L
AST SERPL-CCNC: 113 U/L
BASOPHILS # BLD AUTO: 0.01 K/UL
BASOPHILS NFR BLD: 0.1 %
BILIRUB SERPL-MCNC: 2.8 MG/DL
BUN SERPL-MCNC: 18 MG/DL
CALCIUM SERPL-MCNC: 9.1 MG/DL
CHLORIDE SERPL-SCNC: 93 MMOL/L
CO2 SERPL-SCNC: 31 MMOL/L
CREAT SERPL-MCNC: 0.6 MG/DL
DIFFERENTIAL METHOD: ABNORMAL
EOSINOPHIL # BLD AUTO: 0 K/UL
EOSINOPHIL NFR BLD: 0 %
ERYTHROCYTE [DISTWIDTH] IN BLOOD BY AUTOMATED COUNT: 17.3 %
EST. GFR  (AFRICAN AMERICAN): >60 ML/MIN/1.73 M^2
EST. GFR  (NON AFRICAN AMERICAN): >60 ML/MIN/1.73 M^2
GLUCOSE SERPL-MCNC: 169 MG/DL
HCT VFR BLD AUTO: 30.4 %
HGB BLD-MCNC: 8.8 G/DL
IMM GRANULOCYTES # BLD AUTO: 0.07 K/UL
IMM GRANULOCYTES NFR BLD AUTO: 0.7 %
LYMPHOCYTES # BLD AUTO: 1.2 K/UL
LYMPHOCYTES NFR BLD: 11 %
MAGNESIUM SERPL-MCNC: 1.5 MG/DL
MCH RBC QN AUTO: 31.1 PG
MCHC RBC AUTO-ENTMCNC: 28.9 G/DL
MCV RBC AUTO: 107 FL
MONOCYTES # BLD AUTO: 0.6 K/UL
MONOCYTES NFR BLD: 5.2 %
NEUTROPHILS # BLD AUTO: 8.8 K/UL
NEUTROPHILS NFR BLD: 83 %
NRBC BLD-RTO: 0 /100 WBC
PHOSPHATE SERPL-MCNC: 1.6 MG/DL
PLATELET # BLD AUTO: 228 K/UL
PMV BLD AUTO: 11.8 FL
POCT GLUCOSE: 116 MG/DL (ref 70–110)
POCT GLUCOSE: 124 MG/DL (ref 70–110)
POCT GLUCOSE: 147 MG/DL (ref 70–110)
POCT GLUCOSE: 147 MG/DL (ref 70–110)
POCT GLUCOSE: 160 MG/DL (ref 70–110)
POCT GLUCOSE: 180 MG/DL (ref 70–110)
POCT GLUCOSE: 203 MG/DL (ref 70–110)
POTASSIUM SERPL-SCNC: 3.2 MMOL/L
PROT SERPL-MCNC: 6.5 G/DL
RBC # BLD AUTO: 2.83 M/UL
SODIUM SERPL-SCNC: 132 MMOL/L
VANCOMYCIN SERPL-MCNC: 7 UG/ML
WBC # BLD AUTO: 10.56 K/UL

## 2019-03-12 PROCEDURE — 25000003 PHARM REV CODE 250: Performed by: HOSPITALIST

## 2019-03-12 PROCEDURE — 25000003 PHARM REV CODE 250: Performed by: INTERNAL MEDICINE

## 2019-03-12 PROCEDURE — 94664 DEMO&/EVAL PT USE INHALER: CPT

## 2019-03-12 PROCEDURE — 20600001 HC STEP DOWN PRIVATE ROOM

## 2019-03-12 PROCEDURE — 99900035 HC TECH TIME PER 15 MIN (STAT)

## 2019-03-12 PROCEDURE — 92610 EVALUATE SWALLOWING FUNCTION: CPT

## 2019-03-12 PROCEDURE — 99232 PR SUBSEQUENT HOSPITAL CARE,LEVL II: ICD-10-PCS | Mod: ,,, | Performed by: INTERNAL MEDICINE

## 2019-03-12 PROCEDURE — 25000003 PHARM REV CODE 250: Performed by: STUDENT IN AN ORGANIZED HEALTH CARE EDUCATION/TRAINING PROGRAM

## 2019-03-12 PROCEDURE — 25000242 PHARM REV CODE 250 ALT 637 W/ HCPCS: Performed by: HOSPITALIST

## 2019-03-12 PROCEDURE — 99232 SBSQ HOSP IP/OBS MODERATE 35: CPT | Mod: ,,, | Performed by: INTERNAL MEDICINE

## 2019-03-12 PROCEDURE — 63600175 PHARM REV CODE 636 W HCPCS: Performed by: HOSPITALIST

## 2019-03-12 PROCEDURE — 84100 ASSAY OF PHOSPHORUS: CPT

## 2019-03-12 PROCEDURE — 27000221 HC OXYGEN, UP TO 24 HOURS

## 2019-03-12 PROCEDURE — 83735 ASSAY OF MAGNESIUM: CPT

## 2019-03-12 PROCEDURE — 80053 COMPREHEN METABOLIC PANEL: CPT

## 2019-03-12 PROCEDURE — 94660 CPAP INITIATION&MGMT: CPT

## 2019-03-12 PROCEDURE — 94640 AIRWAY INHALATION TREATMENT: CPT

## 2019-03-12 PROCEDURE — 63600175 PHARM REV CODE 636 W HCPCS: Performed by: STUDENT IN AN ORGANIZED HEALTH CARE EDUCATION/TRAINING PROGRAM

## 2019-03-12 PROCEDURE — 80202 ASSAY OF VANCOMYCIN: CPT

## 2019-03-12 PROCEDURE — 94799 UNLISTED PULMONARY SVC/PX: CPT

## 2019-03-12 PROCEDURE — 94761 N-INVAS EAR/PLS OXIMETRY MLT: CPT

## 2019-03-12 PROCEDURE — 85025 COMPLETE CBC W/AUTO DIFF WBC: CPT

## 2019-03-12 PROCEDURE — 25000003 PHARM REV CODE 250: Performed by: PHYSICIAN ASSISTANT

## 2019-03-12 RX ORDER — POTASSIUM CHLORIDE 20 MEQ/15ML
40 SOLUTION ORAL ONCE
Status: COMPLETED | OUTPATIENT
Start: 2019-03-12 | End: 2019-03-12

## 2019-03-12 RX ORDER — VANCOMYCIN/0.9 % SOD CHLORIDE 1 G/100 ML
1000 PLASTIC BAG, INJECTION (ML) INTRAVENOUS
Status: DISCONTINUED | OUTPATIENT
Start: 2019-03-12 | End: 2019-03-14

## 2019-03-12 RX ADMIN — TRAMADOL HYDROCHLORIDE 50 MG: 50 TABLET, COATED ORAL at 05:03

## 2019-03-12 RX ADMIN — TRAMADOL HYDROCHLORIDE 50 MG: 50 TABLET, COATED ORAL at 10:03

## 2019-03-12 RX ADMIN — IPRATROPIUM BROMIDE AND ALBUTEROL SULFATE 3 ML: .5; 3 SOLUTION RESPIRATORY (INHALATION) at 10:03

## 2019-03-12 RX ADMIN — Medication 1 G: at 01:03

## 2019-03-12 RX ADMIN — THEOPHYLLINE 100.27 MG: 80 SOLUTION ORAL at 09:03

## 2019-03-12 RX ADMIN — URSODIOL 500 MG: 250 TABLET, FILM COATED ORAL at 09:03

## 2019-03-12 RX ADMIN — DULOXETINE 60 MG: 60 CAPSULE, DELAYED RELEASE ORAL at 09:03

## 2019-03-12 RX ADMIN — THEOPHYLLINE 100.27 MG: 80 SOLUTION ORAL at 10:03

## 2019-03-12 RX ADMIN — IPRATROPIUM BROMIDE AND ALBUTEROL SULFATE 3 ML: .5; 3 SOLUTION RESPIRATORY (INHALATION) at 03:03

## 2019-03-12 RX ADMIN — IPRATROPIUM BROMIDE AND ALBUTEROL SULFATE 3 ML: .5; 3 SOLUTION RESPIRATORY (INHALATION) at 12:03

## 2019-03-12 RX ADMIN — PIPERACILLIN AND TAZOBACTAM 4.5 G: 4; .5 INJECTION, POWDER, LYOPHILIZED, FOR SOLUTION INTRAVENOUS; PARENTERAL at 01:03

## 2019-03-12 RX ADMIN — HEPARIN SODIUM 5000 UNITS: 5000 INJECTION, SOLUTION INTRAVENOUS; SUBCUTANEOUS at 01:03

## 2019-03-12 RX ADMIN — FLUTICASONE FUROATE AND VILANTEROL TRIFENATATE 1 PUFF: 100; 25 POWDER RESPIRATORY (INHALATION) at 10:03

## 2019-03-12 RX ADMIN — GABAPENTIN 300 MG: 300 CAPSULE ORAL at 09:03

## 2019-03-12 RX ADMIN — PIPERACILLIN AND TAZOBACTAM 4.5 G: 4; .5 INJECTION, POWDER, LYOPHILIZED, FOR SOLUTION INTRAVENOUS; PARENTERAL at 04:03

## 2019-03-12 RX ADMIN — INSULIN ASPART 4 UNITS: 100 INJECTION, SOLUTION INTRAVENOUS; SUBCUTANEOUS at 05:03

## 2019-03-12 RX ADMIN — HEPARIN SODIUM 5000 UNITS: 5000 INJECTION, SOLUTION INTRAVENOUS; SUBCUTANEOUS at 09:03

## 2019-03-12 RX ADMIN — IPRATROPIUM BROMIDE AND ALBUTEROL SULFATE 3 ML: .5; 3 SOLUTION RESPIRATORY (INHALATION) at 09:03

## 2019-03-12 RX ADMIN — IPRATROPIUM BROMIDE AND ALBUTEROL SULFATE 3 ML: .5; 3 SOLUTION RESPIRATORY (INHALATION) at 07:03

## 2019-03-12 RX ADMIN — INSULIN ASPART 1 UNITS: 100 INJECTION, SOLUTION INTRAVENOUS; SUBCUTANEOUS at 01:03

## 2019-03-12 RX ADMIN — TRAMADOL HYDROCHLORIDE 50 MG: 50 TABLET, COATED ORAL at 02:03

## 2019-03-12 RX ADMIN — PANTOPRAZOLE SODIUM 40 MG: 40 GRANULE, DELAYED RELEASE ORAL at 09:03

## 2019-03-12 RX ADMIN — DOCUSATE SODIUM 50 MG: 50 CAPSULE, LIQUID FILLED ORAL at 09:03

## 2019-03-12 RX ADMIN — HEPARIN SODIUM 5000 UNITS: 5000 INJECTION, SOLUTION INTRAVENOUS; SUBCUTANEOUS at 05:03

## 2019-03-12 RX ADMIN — POTASSIUM CHLORIDE 40 MEQ: 20 SOLUTION ORAL at 12:03

## 2019-03-12 RX ADMIN — PIPERACILLIN AND TAZOBACTAM 4.5 G: 4; .5 INJECTION, POWDER, LYOPHILIZED, FOR SOLUTION INTRAVENOUS; PARENTERAL at 09:03

## 2019-03-12 RX ADMIN — PREDNISONE 10 MG: 10 TABLET ORAL at 09:03

## 2019-03-12 RX ADMIN — CLOPIDOGREL 75 MG: 75 TABLET, FILM COATED ORAL at 09:03

## 2019-03-12 RX ADMIN — ASPIRIN 81 MG CHEWABLE TABLET 81 MG: 81 TABLET CHEWABLE at 09:03

## 2019-03-12 RX ADMIN — COLLAGENASE SANTYL: 250 OINTMENT TOPICAL at 10:03

## 2019-03-12 NOTE — PROGRESS NOTES
[facility] Hospital Medicine                                                                     Progress Note     Team: [facility] A Rosa Juan MD   Admit Date: 1/23/2019   Hospital Day: 47  TAMIKO: 3/12/2019   Code status: Full Code   Principal Problem: Septic shock     SUMMARY:     Patient is a 63 y.o. with chronic diastolic heart failure, COPD on home oxygen at 2 liters, chronic debility, CAD, Type 2 diabetes, HTN, previous CVA with residual right sided weakness, prior appendectomy in 8/2018 complicated by C. Diff infection, failure to thrive admitted to the hospital on 1/23/2019 with nausea vomiting and abdominal pain found to have fluid collection in R hemipelvis. Started on vanc/zosyn. Surgery consulted, s/p ex lap, washout and pelvic abscess drainage 1/25. Post-op course complicated by septic shock secondary to MRSA bacteremia for which she received 2 weeks of IV vancomycin and briefly required pressors.  Wound culture also grew Bacteroides and patient completed 4 weeks of Cipro and Flagyl.  Patient remained intubated postoperatively and was successfully extubated on 01/28.  Initially, she was stabilized and transferred to the floor.  She failed speech language therapy and was started on TPN with subsequent placement of IR guided G-tube in transition to tube feeds on 02/10.  On the floor, patient was making some progress and being followed by [facility]; however, a rapid response was called on 02/19 due to somnolence.  ABG was consistent with acute hypercapnic respiratory failure, and she was placed on BiPAP and transferred back to SICU.  Infectious workup was significant for a UA with 39 WBCs, moderate bacteria, and moderate yeast; urine culture ultimately grew Candida for which she was started on Diflucan IV.  She was also found to have markedly  elevated alk-phos, AST, and ALT.  Hepatology was consulted and felt acute liver injury related to infection versus medication, and not underlying liver disease. And ammonia was checked and was elevated, but hepatology recommended against treatment as they felt patient did not have hepatic encephalopathy.  Right upper quadrant ultrasound showed biliary sludge and gallbladder thickening but was negative for acute cholecystitis.  A subsequent HIDA scan was not consistent with acute cholecystitis; no surgical intervention needed per General surgery evaluation.  Patient's mental status improved in the ICU and is now on nasal cannula oxygen and BiPAP at night.  Medicine initially consulted for assistance with management and noted patient to be markedly fluid overloaded, estimated to be approximately net positive 16 L with this admission.  She had elevated CVP and TTE was consistent volume overload as well as her BNP of > 4900.  She was started on IV diuresis with Lasix with improvement in urine output.  She is to be stepped down to Hospital Medicine for further management and ultimately skilled nursing facility placement.    3/9 - Transfer back to MICU for SEptic Shock requiring vasopressor support - etiology is RLL Pneumonia noted on CT Abdomen/pelvis, no new intraabdominal source of infection    SUBJECTIVE:   Pt seen and examined at bedside. She reports uneventful night. Seen by SLP today and did well, recommended for soft mechanical diet. Discussed dispo plans but she is not agreeable to LTAC. Will d/w her daughter.        Review of Systems   Constitutional: Negative for chills and fever.   Respiratory: Positive for cough. Negative for wheezing.    Cardiovascular: Negative for chest pain.   Gastrointestinal: Negative for abdominal pain and vomiting.   Neurological: Positive for weakness.         OBJECTIVE:     Vitals:  Temp:  [97 °F (36.1 °C)-98.7 °F (37.1 °C)]   Pulse:  [58-98]   Resp:  [12-20]   BP: (132-180)/(74-97)    SpO2:  [93 %-100 %]      I & O (Last 24H):     Intake/Output Summary (Last 24 hours) at 3/12/2019 1144  Last data filed at 3/12/2019 0500  Gross per 24 hour   Intake 940 ml   Output --   Net 940 ml       Physical Exam   Constitutional: She is oriented to person, place, and time. No distress.   Neck: No JVD present.   Cardiovascular: Normal rate and regular rhythm.   Murmur heard.  Pulmonary/Chest: Effort normal. No respiratory distress. She has no wheezes.   Decreased Right Mid-Basilar Breath sounds  2L nasal cannula   Abdominal: Soft. Bowel sounds are normal. There is no tenderness.   PEG in place. Surrounding skin clean and dry   Lymphadenopathy:     She has no cervical adenopathy.   Neurological: She is alert and oriented to person, place, and time.   Skin: Skin is warm and dry.   Psychiatric: She has a normal mood and affect. Her behavior is normal. Thought content normal.   Nursing note and vitals reviewed.      All recent labs and imaging has been reviewed.     Recent Results (from the past 24 hour(s))   POCT glucose    Collection Time: 03/11/19 12:27 PM   Result Value Ref Range    POCT Glucose 154 (H) 70 - 110 mg/dL   POCT glucose    Collection Time: 03/11/19  5:22 PM   Result Value Ref Range    POCT Glucose 134 (H) 70 - 110 mg/dL   POCT glucose    Collection Time: 03/11/19  8:38 PM   Result Value Ref Range    POCT Glucose 131 (H) 70 - 110 mg/dL   POCT glucose    Collection Time: 03/11/19 11:56 PM   Result Value Ref Range    POCT Glucose 160 (H) 70 - 110 mg/dL   POCT glucose    Collection Time: 03/12/19  1:51 AM   Result Value Ref Range    POCT Glucose 180 (H) 70 - 110 mg/dL   Comprehensive Metabolic Panel (CMP)    Collection Time: 03/12/19  5:12 AM   Result Value Ref Range    Sodium 132 (L) 136 - 145 mmol/L    Potassium 3.2 (L) 3.5 - 5.1 mmol/L    Chloride 93 (L) 95 - 110 mmol/L    CO2 31 (H) 23 - 29 mmol/L    Glucose 169 (H) 70 - 110 mg/dL    BUN, Bld 18 8 - 23 mg/dL    Creatinine 0.6 0.5 - 1.4 mg/dL     Calcium 9.1 8.7 - 10.5 mg/dL    Total Protein 6.5 6.0 - 8.4 g/dL    Albumin 2.3 (L) 3.5 - 5.2 g/dL    Total Bilirubin 2.8 (H) 0.1 - 1.0 mg/dL    Alkaline Phosphatase 737 (H) 55 - 135 U/L     (H) 10 - 40 U/L    ALT 79 (H) 10 - 44 U/L    Anion Gap 8 8 - 16 mmol/L    eGFR if African American >60.0 >60 mL/min/1.73 m^2    eGFR if non African American >60.0 >60 mL/min/1.73 m^2   CBC auto differential    Collection Time: 03/12/19  5:12 AM   Result Value Ref Range    WBC 10.56 3.90 - 12.70 K/uL    RBC 2.83 (L) 4.00 - 5.40 M/uL    Hemoglobin 8.8 (L) 12.0 - 16.0 g/dL    Hematocrit 30.4 (L) 37.0 - 48.5 %     (H) 82 - 98 fL    MCH 31.1 (H) 27.0 - 31.0 pg    MCHC 28.9 (L) 32.0 - 36.0 g/dL    RDW 17.3 (H) 11.5 - 14.5 %    Platelets 228 150 - 350 K/uL    MPV 11.8 9.2 - 12.9 fL    Immature Granulocytes 0.7 (H) 0.0 - 0.5 %    Gran # (ANC) 8.8 (H) 1.8 - 7.7 K/uL    Immature Grans (Abs) 0.07 (H) 0.00 - 0.04 K/uL    Lymph # 1.2 1.0 - 4.8 K/uL    Mono # 0.6 0.3 - 1.0 K/uL    Eos # 0.0 0.0 - 0.5 K/uL    Baso # 0.01 0.00 - 0.20 K/uL    nRBC 0 0 /100 WBC    Gran% 83.0 (H) 38.0 - 73.0 %    Lymph% 11.0 (L) 18.0 - 48.0 %    Mono% 5.2 4.0 - 15.0 %    Eosinophil% 0.0 0.0 - 8.0 %    Basophil% 0.1 0.0 - 1.9 %    Differential Method Automated    Magnesium    Collection Time: 03/12/19  5:12 AM   Result Value Ref Range    Magnesium 1.5 (L) 1.6 - 2.6 mg/dL   Phosphorus    Collection Time: 03/12/19  5:12 AM   Result Value Ref Range    Phosphorus 1.6 (L) 2.7 - 4.5 mg/dL   Vancomycin, random    Collection Time: 03/12/19  5:12 AM   Result Value Ref Range    Vancomycin, Random 7.0 Not established ug/mL   POCT glucose    Collection Time: 03/12/19  5:35 AM   Result Value Ref Range    POCT Glucose 203 (H) 70 - 110 mg/dL   POCT glucose    Collection Time: 03/12/19  8:22 AM   Result Value Ref Range    POCT Glucose 124 (H) 70 - 110 mg/dL       Recent Labs   Lab 03/11/19  1722 03/11/19  2038 03/11/19  2356 03/12/19  0151 03/12/19  0535  03/12/19  0822   POCTGLUCOSE 134* 131* 160* 180* 203* 124*        Active Hospital Problems    Diagnosis  POA    *Septic shock [A41.9, R65.21]  No    Wide nasal bridge [Q30.8]  Not Applicable    Right lower lobe pneumonia [J18.1]  No    Proctitis [K62.89]  No    Adrenal insufficiency [E27.40]  No    Chronic respiratory failure with hypoxia and hypercapnia [J96.11, J96.12]  Yes    Hypomagnesemia [E83.42]  Yes    Hypophosphatemia [E83.39]  Yes    Other specified anemias [D64.89]  Yes    Hypokalemia [E87.6]  Yes    Generalized abdominal pain [R10.84]  Yes    Abnormal thyroid function test [R94.6]  Yes    Urinary incontinence without sensory awareness [N39.42]  Yes    Elevated liver enzymes [R74.8]  No    On enteral nutrition [Z78.9]  No    Alteration in skin integrity due to moisture [R23.9]  Yes    Dysphagia [R13.10]  No    Multiple skin tears [T14.8XXA]  Yes    Alteration in skin integrity related to surgical incision [R23.9]  Yes    Depression [F32.9]  Yes    Pelvic abscess in female [N73.9]  Yes    Debility [R53.81]  Yes    Severe malnutrition [E43]  Yes    Moderate asthma without complication [J45.909]  Yes    Type 2 diabetes mellitus without complication, without long-term current use of insulin [E11.9]  Yes    CVA, old, hemiparesis [I69.359]  Not Applicable    Coronary artery disease involving native coronary artery of native heart without angina pectoris [I25.10]  Yes      Resolved Hospital Problems    Diagnosis Date Resolved POA    Bradycardia [R00.1] 03/10/2019 No    Acute respiratory failure with hypoxia and hypercarbia [J96.01, J96.02] 03/10/2019 No    Acute cystitis [N30.00] 02/26/2019 Yes    Idioventricular rhythm [I44.2] 03/10/2019 Yes    Hypophosphatemia [E83.39] 02/21/2019 No    Severe sepsis [A41.9, R65.20] 02/02/2019 No    Acute renal failure superimposed on stage 3 chronic kidney disease [N17.9, N18.3] 02/24/2019 Yes    Hypomagnesemia [E83.42] 01/25/2019 Yes     Acute metabolic encephalopathy [G93.41] 03/10/2019 Yes    Essential hypertension [I10] 02/20/2019 Yes    Acute on chronic diastolic (congestive) heart failure [I50.33] 03/10/2019 Yes          ASSESSMENT AND PLAN:     Septic shock  Right Lower Lobe Pneumonia   -Blood Cultures show No growth  -s/p 1day MICU stay, repeat ICU txfr.   -Stress Dose steroids weaned for patient to resume chronic prednisone by 3/12  -Sputum Culture with presumptive Staph, final cultures pending  -Continue empiric Zosyn. Vanc held, last level 7, will resume.       Pelvic Abscesses/Generalized abdominal pain  Due to recent Pelvic abscess in female  CT abdomen showed a 4 x 3 cm enhancing fluid collection consistent with abscess.  She also had elevated lactate of 6.8.  She was admitted to the STICU service (Dr. Laurent) for initial management. Patient taken to OR on 01/25 for ex lap with drainage of abscess.  Postoperative course complicated by septic shock secondary to MRSA bacteremia for which she received 2 weeks of IV vancomycin and briefly required pressors, which have now been weaned off.  Wound culture also grew Bacteroides and patient completed 4 weeks of Cipro and Flagyl.  Patient remained intubated postoperatively and was successfully extubated on 01/28.  Initially, she was stabilized and transferred to the floor.     On the floor, patient was making some progress and being followed by [facility]; however, a rapid response was called on 02/19 due to somnolence.  ABG was consistent with acute hypercapnic respiratory  failure, and she was placed on BiPAP and transferred back to SICU.  Infectious workup was significant for a UA with 39 WBCs, moderate bacteria, and moderate yeast; urine culture ultimately grew Candida for which she was started on Diflucan IV.    3/3 surgery looked at wounds and deferred to wound care    Elevated liver enzymes - improving   She was also found to have markedly elevated alk-phos, AST, and ALT.  Hepatology  was consulted and felt acute liver injury related to infection versus medication, and not underlying liver disease. And ammonia was checked and was elevated, but hepatology recommended against treatment as they felt patient did not have hepatic encephalopathy.  Right upper quadrant ultrasound showed biliary sludge and gallbladder thickening but was negative for acute cholecystitis.  A subsequent HIDA scan was not consistent with acute cholecystitis; no surgical intervention needed per General surgery evaluation.    - re-consulted hepatology 2/27 who thought this could be drug induced.  DO not use cipro.  Recommend a trial of ursodiol 600mg BID to help with cholestasis with peak Bili of 12.  If not better in two weeks may need biopsy.   - diarrhea may be bile induced, monitor for improvement and consider cholestyramine if not better      Moderate asthma without complication/ Chronic Hypoxic & Hypercapnic respiratory failure  -Requires Nightly BIPAP   >Prone to hypercapnic narcosis  -Continue q4WAKE - Duo Nebs.    . Steroid dependant asthma - chronically on Prednisone 10mg.   - wean o2  - bipap qhs and prn for AMS   - c/w daily breo, theophylline,     Debility  Mostly wheelchair bound, but able to work with [facility] using walker  - Long-term acute care facility   -abdominal wound requiring dressing, needs intensive PT, on tube feeds requiring SLP.    Dysphagia/Severe Malnutrition   She failed speech language therapy and was started on TPN with subsequent placement of IR guided G-tube in transition to tube feeds on 02/10. Surgery replaced tube 3/2 when pt pulled it out.  - c/w tube feeds  - SLP saw pt 3/12. Advance to mechanical soft with thin liquids     Coronary artery disease involving native coronary artery of native heart without angina pectoris  - c/w ASA, plavix, statin  - no anginal equivalent    Type 2 diabetes mellitus without complication, without long-term current use of insulin  -- acceptable; continue  with current treatment & monitor         Resolved Issues   Acute on chronic diastolic (congestive) heart failure - resolved  Patient's mental status improved in the ICU and is now on nasal cannula oxygen and BiPAP at night.  Medicine initially consulted 2/22 for assistance with management and noted patient to be markedly fluid overloaded, estimated to be approximately net positive 16 L with this admission.  Weight had increased from 71-75kg to 87kg.   She had elevated CVP and TTE was consistent volume overload as well as her BNP of > 4900.     2/22 IV diuresis with Lasix 40 bid  with improvement in urine output.  3/2 weight down to 73kg which is near her dry weight per epic but BNP was 2600;   -3/5 switched to PO lasix as her weight is now 66kg and exam is improved.  DTR states that patient at home is prone to dehydration with lasix so it is not used daily.  - Lasix PO every other day for now    Hypomagnesemia - resolved  Acute respiratory failure with hypoxia and hypercarbia - resolved  Bradycardia - resolved    Prophylaxis- Hep TID    Code Status- Full Code     Discharge plan and follow up - LTAC vs SNF, pending          Rosa Juan MD  Attending Physician  [facility] Medicine Dept.

## 2019-03-12 NOTE — PLAN OF CARE
Problem: Adult Inpatient Plan of Care  Goal: Plan of Care Review  Outcome: Ongoing (interventions implemented as appropriate)  POC reviewed at bedside with patient. Questions and concerns addressed. BP elevated this shift. CM = Paced at times. NPO except ice chips. Noted to be drinking water from melted ice. Removed pitcher from reach. Med x2 for report of RLE pain with good effect. Safety maintained. Bed in low and locked position. Call light within reach. Side rails up x2. Frequent rounds.

## 2019-03-12 NOTE — PROGRESS NOTES
"  [facility]  Adult Nutrition  Consult Note    SUMMARY     Recommendations    1. Decrease TF rate (of Glucerna 1.5) to 40 mL/hr to provide 1440 calories, 79 grams of protein, 729 mL fluid.    - Hold for residuals >500 mL; additional fluid per MD.  2. Continue current Dysphagia soft diet (texture per SLP).  3. RD to monitor & follow-up.    Goals: Patient to meet > 85% EEN and EPN  Nutrition Goal Status: goal met  Communication of RD Recs: reviewed with RN    Reason for Assessment    Reason For Assessment: RD follow-up  Diagnosis: infection/sepsis(pelvic abscess)  Relevant Medical History: COPD, CVA, DM, HTN, HLD  Interdisciplinary Rounds: attended    General Information Comments: Pt continues to tolerate enteral nutrition via PEG. ST recommends dysphagia soft diet w/ thin liquids, diet just advanced. Continues with muscle/fat wasting, severe malnutrition diagnosis continues (NFPE complete ).   Nutrition Discharge Planning: Adequate nutrition    Nutrition/Diet History    Typical Food/Fluid Intake: Per MD note, patient reported poor PO itnake PTA 2/2 abdominal pain and N/V.  Spiritual, Cultural Beliefs, Latter-day Practices, Values that Affect Care: no  Factors Affecting Nutritional Intake: difficulty/impaired swallowing    Anthropometrics    Temp: 97.6 °F (36.4 °C)  Height: 5' 2" (157.5 cm)  Height (inches): 62 in  Weight Method: Bed Scale  Weight: 61.7 kg (136 lb 0.4 oz)  Weight (lb): 136.03 lb  Ideal Body Weight (IBW), Female: 110 lb  % Ideal Body Weight, Female (lb): 123.66 lb  BMI (Calculated): 24.9  BMI Grade: 18.5-24.9 - normal  Usual Body Weight (UBW), k.6 kg(per chart review 2018)  % Usual Body Weight: 87.46  % Weight Change From Usual Weight: -12.73 %    Lab/Procedures/Meds    Pertinent Labs Reviewed: reviewed  Pertinent Labs Comments: Na 132, Gluc 169  Pertinent Medications Reviewed: reviewed  Pertinent Medications Comments: -    Estimated/Assessed Needs    Weight Used For " Calorie Calculations: 61.7 kg (136 lb 0.4 oz)     Energy Calorie Requirements (kcal): 1406 kcal/d  Energy Need Method: Cambridge-St Jeor(x 1.25)     Protein Requirements: 74 g/d (1.2 g/kg)  Weight Used For Protein Calculations: 61.7 kg (136 lb 0.4 oz)     Fluid Requirements (mL): 1 mL/kcal or per MD     CHO Requirement: 50% total kcal    Nutrition Prescription Ordered    Current Diet Order: Dysphagia soft w/ thin liquids  Current Nutrition Support Formula Ordered: Glucerna 1.5  Current Nutrition Support Rate Ordered: 45 mL/hr    Evaluation of Received Nutrient/Fluid Intake    Enteral Calories (kcal): 1620  Enteral Protein (gm): 89  Enteral (Free Water) Fluid (mL): 820  Water flushes: 750 mL    % Kcal Needs: 115%  % Protein Needs: 120%    Energy Calories Required: exceeds needs  Protein Required: exceeds needs  Fluid Required: (per MD)    Comments: LBM: 3/12    Tolerance: tolerating    Nutrition Risk    Level of Risk/Frequency of Follow-up: (1x/week)     Assessment and Plan    Severe malnutrition     Contributing Nutrition Diagnosis  Malnutrition in the context of Acute Illness/Injury     Related to (etiology):  Decreased PO intake 2/2 abdominal pain and N/V     Signs and Symptoms (as evidenced by):  Energy Intake: <75% of estimated energy requirement for several months per patient  Body Fat Depletion: mild and moderate depletion of orbitals and triceps   Muscle Mass Depletion: mild and moderate depletion of temples, clavicle region and lower extremities   Weight Loss: 12.7% x 5 months   Fluid Accumulation: mild and moderate     Interventions/Recommendations (treatment strategy):  Collaboration and Referral of Nutrition Care   Enteral Nutrition      Nutrition Diagnosis Status:  Improving     Monitor and Evaluation    Food and Nutrient Intake: energy intake, food and beverage intake, enteral nutrition intake  Food and Nutrient Adminstration: diet order, enteral and parenteral nutrition administration  Physical Activity  and Function: nutrition-related ADLs and IADLs  Anthropometric Measurements: weight, weight change  Biochemical Data, Medical Tests and Procedures: electrolyte and renal panel, gastrointestinal profile, glucose/endocrine profile, inflammatory profile  Nutrition-Focused Physical Findings: overall appearance     Malnutrition Assessment    Malnutrition Type: acute illness or injury  Orbital Region (Subcutaneous Fat Loss): mild depletion  Upper Arm Region (Subcutaneous Fat Loss): moderate depletion   Romney Region (Muscle Loss): moderate depletion  Clavicle Bone Region (Muscle Loss): mild depletion  Anterior Thigh Region (Muscle Loss): mild depletion   Edema (Fluid Accumulation): (mild/moderate)     Nutrition Follow-Up    RD Follow-up?: Yes

## 2019-03-12 NOTE — PT/OT/SLP EVAL
Speech Language Pathology Evaluation  Bedside Swallow    Patient Name:  Sheryl Martines   MRN:  [id number]  Admitting Diagnosis: Septic shock    Recommendations:                 General Recommendations:  Dysphagia therapy and follow up for tolerance  Diet recommendations:  Mechanical soft, Thin   Aspiration Precautions: HOB to 90 degrees, Monitor for s/s of aspiration, Small bites/sips and Standard aspiration precautions   General Precautions: Standard, fall, aspiration(mechanical soft)  Communication strategies:  none    History:     Past Medical History:   Diagnosis Date    Abnormal LFTs 12/14/2018    Closed compression fracture of fourth lumbar vertebra     Closed fracture of right tibia and fibula 10/3/2018    COPD (chronic obstructive pulmonary disease)     home O2    Coronary artery disease     Diabetes mellitus     Fall 10/4/2018    Glaucoma     High cholesterol     Hypertension     Infected incision 9/20/2018    Iritis     Pulmonary embolus     S/P appendectomy 9/11/2018    Stroke     rt sided weakness.       Past Surgical History:   Procedure Laterality Date    ABDOMINAL SURGERY      APPENDECTOMY N/A 8/26/2018    Performed [facility]rez [facility] at [facility] OR    APPENDECTOMY, LAPAROSCOPIC---CONVERTED TO OPEN APPENDECTOMY @0950 N/A 8/26/2018    Performed by [facility] at [facility] OR    APPLICATION, WOUND VAC N/A 1/25/2019    Performed by [facility] at [facility] OR 2ND FLR    BACK SURGERY      stimulator    CATARACT EXTRACTION      EXCISION, ABSCESS- drainage abdominal wall abscess N/A 1/25/2019    Performed by [facility] Laurent [facility] at [facility] OR 2ND FLR    HYSTERECTOMY      INCISION AND DRAINAGE, ABSCESS-  drainage of pelvic abscess N/A 1/25/2019    Performed by [facility] Laurent [facility] at [facility] OR 2ND FLR    LAPAROTOMY, EXPLORATORY N/A 1/25/2019    Performed by Monster Laurent [facility] at [facility] OR 2ND FLR    TOTAL HIP ARTHROPLASTY Left     2008       Social History:  Patient lives with daughter.    Prior Intubation HX:  1/25/19, 1/26/2019- 1/29/2019    Modified Barium Swallow: Per MBSS report on 2/7:   Patient presents with Mild Oral Dysphagia and Moderate-Severe Pharyngeal Dysphagia as characterized by prolonged A-P transfer, decreased BOT mobility, premature spillage with pooling in the valleculae, decreased hyolaryngeal excursion and elevation patterns with aspiration of thin liquids during the swallow and penetration of saliva mixed with thin and pureed residuals after the swallow. Mild-moderate naso pharyngeal reflux noted with thin liquids. Pt with sensation intact and cleared throat to clear aspirated material.  Pt with significant residuals following presentations of thin and puree trials noted to remain in valleculae post swallow, which Patient was unable to adequately clear with use of multiple, effortful swallows, chin tuck or throat clears. Pt with subsequent penetration of saliva mixed with thin and pureed residuals across atttempts to clear.  Patient also demonstrated  productive cough and orally expelled some of residuals between fluoro.  Appearance of diffuse pharyngeal edema noted to impact stripping wave force. Patient's level of pain noted to impact attention as assessment progressed and impact efficacy of compensatory strategies. Findings reviewed with Radiologist and additional trials held 2/2 risk of penetration/aspiration with saliva mixed with residuals and subsequent bites/sips.  Patient not appropriate for PO intake at this time. REC; Continue NPO with alternative means nutrition/hydration/medicaiton and ST to continue to follow for Dysphagia therapy and ongoing Patient/family training and education.     Chest X-Rays: 3/8/2019  FINDINGS:  Since the previous exam, left central venous catheter has been placed, tip overlies the distal SVC/right atrial junction.  There is no pneumothorax or significant interval detrimental change in the cardiopulmonary  "status since the previous exam.    Prior diet: NPO after MBSS 2/7; NPO per recent ST recs prior to ICU admit.     Subjective     "I dont like applesauce, can I have pudding instead?"  Patient goals: to eat/drink     Pain/Comfort:  · Pain Rating 1: 0/10  · Pain Rating Post-Intervention 1: 0/10    Objective:     Oral Musculature Evaluation  · Oral Musculature: WFL  · Dentition: present and adequate  · Secretion Management: adequate  · Mucosal Quality: good  · Mandibular Strength and Mobility: impaired(reduced strength )  · Oral Labial Strength and Mobility: WFL  · Lingual Strength and Mobility: WFL  · Volitional Cough: strong  · Volitional Swallow: decreased elevation upon palpation   · Voice Prior to PO Intake: clear  · Oral Musculature Comments: patient with palatine kanu, see ENT consult for furhter details    Bedside Swallow Eval:   Consistencies Assessed:  · Thin liquids ice chips X2, thin via cup X6  · Puree teaspoon X2  · Solids 1/4 cracker     Oral Phase:   · Prolonged mastication  · Oral residue decreased by liquid wash and additional swallow     Pharyngeal Phase:   · no overt clinical signs/symptoms of aspiration  · no overt clinical signs/symptoms of pharyngeal dysphagia   · Decreased laryngeal elevation upon palpation (consistent with previous MBSS report from 2/7)     Compensatory Strategies  · None    Treatment: Results and recommendations reviewed with pt, daughter, nurse and team re: mechanical soft solids w thin liquids. Aspiration precautions and dysphagia exercises reviewed with pt and daughter who verbalized understanding. SLP educated on results from previous MBSS and possible MBSS to determine current functioning. Should SLP feel pt needs MBSS to determine appropriateness of diet, please place order. ST feels pt is safe for mechanical soft solids/thin liquids at this time with aspiration precautions in place.   Pt left with nurse and daughter present in NAD.   Whiteboard updated.     Assessment: "     Sheryl Martines is a 63 y.o. female with an SLP diagnosis of Dysphagia.  She presents with mild dysphagia at this date. ST to follow up to ensure diet tolerance and determine candidacy for MBSS at later date to rule out aspiration 2/2 decreased laryngeal elevation.     Goals:   Multidisciplinary Problems     SLP Goals        Problem: SLP Goal    Goal Priority Disciplines Outcome   SLP Goal     SLP Ongoing (interventions implemented as appropriate)   Description:  Speech-Language Pathology Goals  Goals expected to be met by 3/19:   1) Pt will tolerate mechanical soft solids w thin liquids without signs of aspiration.   2) Pt will participate in dysphagia exercises to improve swallow functioning.   3) Pt will participate in ongoing swallow assessment to determine appropriateness of diet.               Multidisciplinary Problems (Resolved)        Problem: SLP Goal    Goal Priority Disciplines Outcome   SLP Goal   (Resolved)     SLP Outcome(s) achieved   Description:  Speech Language Pathology Goals  Goals expected to be met by 3/14/19  1. Pt will participate in further, instrumental assessment via MBSS when appropriate.   2. Educate Pt and family on S/S aspiration and aspiration precautions   3. Pt will complete dysphagia exercises x10 ea with good effort 90% of attempts to improve BOT coordination and pharyngeal strength/coordination.   4. Pt will independently complete simple problem solving tasks with 75% accuracy.  5. Pt will follow complex directions with 80% accuracy and min assist.  6. Pt will maintain alertness for entire session with min assist.  7. Pt will recall 3/4 unrelated objects with a 5-minute interval and min assist.  8. Pt will complete simple categorization tasks with 75% accuracy and min assist.  9. Pt will participate in reading/writing/visuospatial/additional cognitive-linguistic assessment in order to determine most effective POC.                                  Plan:     · Patient to be  seen:  4 x/week   · Plan of Care expires:  04/10/19  · Plan of Care reviewed with:  patient, daughter   · SLP Follow-Up:  Yes       Discharge recommendations:  nursing facility, skilled       Time Tracking:     SLP Treatment Date:   03/12/19  Speech Start Time:  0950  Speech Stop Time:  1006     Speech Total Time (min):  16 min    Billable Minutes: Eval Swallow and Oral Function 16     María Elena Gross M.S., CCC-SLP  Speech Language Pathologist  [phone] - pager   3/12/2019      María Elena Gross, MS CCC-SLP  03/12/2019

## 2019-03-12 NOTE — PLAN OF CARE
Problem: SLP Goal  Goal: SLP Goal  Speech-Language Pathology Goals  Goals expected to be met by 3/19:   1) Pt will tolerate mechanical soft solids w thin liquids without signs of aspiration.   2) Pt will participate in dysphagia exercises to improve swallow functioning.   3) Pt will participate in ongoing swallow assessment to determine appropriateness of diet.   Outcome: Ongoing (interventions implemented as appropriate)    Swallow evaluation completed. ST recommending mechanical soft solids w thin liquids at this time with strict aspiration precautions. ST to follow according to POC.     GIANCARLO Coker.S., CCC-SLP  Speech Language Pathologist  [phone] - pager   3/12/2019

## 2019-03-12 NOTE — PLAN OF CARE
ION spoke with [facility]'s admissions and they will be ready to take pt tomorrow. ION called [employer] to notify her but had to leave message.    Kena Cosby, [facility]  [id number]

## 2019-03-13 PROBLEM — R65.21 SEPTIC SHOCK: Status: RESOLVED | Noted: 2019-03-08 | Resolved: 2019-03-13

## 2019-03-13 PROBLEM — A41.9 SEPTIC SHOCK: Status: RESOLVED | Noted: 2019-03-08 | Resolved: 2019-03-13

## 2019-03-13 PROBLEM — E87.6 HYPOKALEMIA: Status: RESOLVED | Noted: 2019-02-25 | Resolved: 2019-03-13

## 2019-03-13 PROBLEM — E83.42 HYPOMAGNESEMIA: Status: RESOLVED | Noted: 2019-02-27 | Resolved: 2019-03-13

## 2019-03-13 PROBLEM — N73.9 PELVIC ABSCESS IN FEMALE: Status: RESOLVED | Noted: 2019-01-25 | Resolved: 2019-03-13

## 2019-03-13 PROBLEM — E83.39 HYPOPHOSPHATEMIA: Status: RESOLVED | Noted: 2019-02-26 | Resolved: 2019-03-13

## 2019-03-13 LAB
ALBUMIN SERPL BCP-MCNC: 2.2 G/DL
ALP SERPL-CCNC: 678 U/L
ALT SERPL W/O P-5'-P-CCNC: 104 U/L
ANION GAP SERPL CALC-SCNC: 9 MMOL/L
AST SERPL-CCNC: 143 U/L
BACTERIA BLD CULT: NORMAL
BACTERIA BLD CULT: NORMAL
BASOPHILS # BLD AUTO: 0.01 K/UL
BASOPHILS NFR BLD: 0.1 %
BILIRUB SERPL-MCNC: 2.8 MG/DL
BUN SERPL-MCNC: 17 MG/DL
CALCIUM SERPL-MCNC: 9.2 MG/DL
CHLORIDE SERPL-SCNC: 94 MMOL/L
CO2 SERPL-SCNC: 29 MMOL/L
CREAT SERPL-MCNC: 0.5 MG/DL
DIFFERENTIAL METHOD: ABNORMAL
EOSINOPHIL # BLD AUTO: 0.5 K/UL
EOSINOPHIL NFR BLD: 4.2 %
ERYTHROCYTE [DISTWIDTH] IN BLOOD BY AUTOMATED COUNT: 16.9 %
EST. GFR  (AFRICAN AMERICAN): >60 ML/MIN/1.73 M^2
EST. GFR  (NON AFRICAN AMERICAN): >60 ML/MIN/1.73 M^2
GLUCOSE SERPL-MCNC: 67 MG/DL
HCT VFR BLD AUTO: 29.8 %
HGB BLD-MCNC: 9 G/DL
IMM GRANULOCYTES # BLD AUTO: 0.06 K/UL
IMM GRANULOCYTES NFR BLD AUTO: 0.6 %
LYMPHOCYTES # BLD AUTO: 2.7 K/UL
LYMPHOCYTES NFR BLD: 24.7 %
MAGNESIUM SERPL-MCNC: 1.3 MG/DL
MCH RBC QN AUTO: 31 PG
MCHC RBC AUTO-ENTMCNC: 30.2 G/DL
MCV RBC AUTO: 103 FL
MONOCYTES # BLD AUTO: 1 K/UL
MONOCYTES NFR BLD: 9.3 %
NEUTROPHILS # BLD AUTO: 6.6 K/UL
NEUTROPHILS NFR BLD: 61.1 %
NRBC BLD-RTO: 0 /100 WBC
PHOSPHATE SERPL-MCNC: 2 MG/DL
PLATELET # BLD AUTO: 235 K/UL
PMV BLD AUTO: 11.7 FL
POCT GLUCOSE: 53 MG/DL (ref 70–110)
POCT GLUCOSE: 68 MG/DL (ref 70–110)
POCT GLUCOSE: 83 MG/DL (ref 70–110)
POCT GLUCOSE: 86 MG/DL (ref 70–110)
POTASSIUM SERPL-SCNC: 3.4 MMOL/L
PROT SERPL-MCNC: 6 G/DL
RBC # BLD AUTO: 2.9 M/UL
SODIUM SERPL-SCNC: 132 MMOL/L
WBC # BLD AUTO: 10.83 K/UL

## 2019-03-13 PROCEDURE — 63600175 PHARM REV CODE 636 W HCPCS: Performed by: STUDENT IN AN ORGANIZED HEALTH CARE EDUCATION/TRAINING PROGRAM

## 2019-03-13 PROCEDURE — 94660 CPAP INITIATION&MGMT: CPT

## 2019-03-13 PROCEDURE — 27000646 HC AEROBIKA DEVICE

## 2019-03-13 PROCEDURE — 94640 AIRWAY INHALATION TREATMENT: CPT

## 2019-03-13 PROCEDURE — 94799 UNLISTED PULMONARY SVC/PX: CPT

## 2019-03-13 PROCEDURE — 99232 SBSQ HOSP IP/OBS MODERATE 35: CPT | Mod: ,,, | Performed by: INTERNAL MEDICINE

## 2019-03-13 PROCEDURE — 25000003 PHARM REV CODE 250: Performed by: STUDENT IN AN ORGANIZED HEALTH CARE EDUCATION/TRAINING PROGRAM

## 2019-03-13 PROCEDURE — 25000003 PHARM REV CODE 250: Performed by: PHYSICIAN ASSISTANT

## 2019-03-13 PROCEDURE — 94664 DEMO&/EVAL PT USE INHALER: CPT

## 2019-03-13 PROCEDURE — 99900035 HC TECH TIME PER 15 MIN (STAT)

## 2019-03-13 PROCEDURE — 92526 ORAL FUNCTION THERAPY: CPT

## 2019-03-13 PROCEDURE — 25000003 PHARM REV CODE 250: Performed by: INTERNAL MEDICINE

## 2019-03-13 PROCEDURE — 83735 ASSAY OF MAGNESIUM: CPT

## 2019-03-13 PROCEDURE — 25000003 PHARM REV CODE 250: Performed by: HOSPITALIST

## 2019-03-13 PROCEDURE — 63600175 PHARM REV CODE 636 W HCPCS: Performed by: HOSPITALIST

## 2019-03-13 PROCEDURE — 36415 COLL VENOUS BLD VENIPUNCTURE: CPT

## 2019-03-13 PROCEDURE — 20600001 HC STEP DOWN PRIVATE ROOM

## 2019-03-13 PROCEDURE — 84100 ASSAY OF PHOSPHORUS: CPT

## 2019-03-13 PROCEDURE — 63600175 PHARM REV CODE 636 W HCPCS: Performed by: INTERNAL MEDICINE

## 2019-03-13 PROCEDURE — 25000242 PHARM REV CODE 250 ALT 637 W/ HCPCS: Performed by: HOSPITALIST

## 2019-03-13 PROCEDURE — 85025 COMPLETE CBC W/AUTO DIFF WBC: CPT

## 2019-03-13 PROCEDURE — 99232 PR SUBSEQUENT HOSPITAL CARE,LEVL II: ICD-10-PCS | Mod: ,,, | Performed by: INTERNAL MEDICINE

## 2019-03-13 PROCEDURE — 27000221 HC OXYGEN, UP TO 24 HOURS

## 2019-03-13 PROCEDURE — 94761 N-INVAS EAR/PLS OXIMETRY MLT: CPT

## 2019-03-13 PROCEDURE — 80053 COMPREHEN METABOLIC PANEL: CPT

## 2019-03-13 RX ORDER — LIDOCAINE 50 MG/G
OINTMENT TOPICAL EVERY 6 HOURS PRN
Start: 2019-03-13

## 2019-03-13 RX ORDER — SODIUM,POTASSIUM PHOSPHATES 280-250MG
1 POWDER IN PACKET (EA) ORAL ONCE
Status: COMPLETED | OUTPATIENT
Start: 2019-03-13 | End: 2019-03-13

## 2019-03-13 RX ORDER — DIPHENHYDRAMINE HCL 25 MG
25 CAPSULE ORAL ONCE
Status: COMPLETED | OUTPATIENT
Start: 2019-03-13 | End: 2019-03-13

## 2019-03-13 RX ADMIN — ACETAMINOPHEN 650 MG: 325 TABLET ORAL at 01:03

## 2019-03-13 RX ADMIN — DIPHENHYDRAMINE HYDROCHLORIDE 25 MG: 25 CAPSULE ORAL at 08:03

## 2019-03-13 RX ADMIN — POTASSIUM & SODIUM PHOSPHATES POWDER PACK 280-160-250 MG 1 PACKET: 280-160-250 PACK at 09:03

## 2019-03-13 RX ADMIN — ASPIRIN 81 MG CHEWABLE TABLET 81 MG: 81 TABLET CHEWABLE at 09:03

## 2019-03-13 RX ADMIN — LIDOCAINE: 50 OINTMENT TOPICAL at 01:03

## 2019-03-13 RX ADMIN — MAGNESIUM SULFATE HEPTAHYDRATE 1 G: 500 INJECTION, SOLUTION INTRAMUSCULAR; INTRAVENOUS at 09:03

## 2019-03-13 RX ADMIN — LOPERAMIDE HYDROCHLORIDE 2 MG: 1 SOLUTION ORAL at 05:03

## 2019-03-13 RX ADMIN — PIPERACILLIN AND TAZOBACTAM 4.5 G: 4; .5 INJECTION, POWDER, LYOPHILIZED, FOR SOLUTION INTRAVENOUS; PARENTERAL at 05:03

## 2019-03-13 RX ADMIN — IPRATROPIUM BROMIDE AND ALBUTEROL SULFATE 3 ML: .5; 3 SOLUTION RESPIRATORY (INHALATION) at 11:03

## 2019-03-13 RX ADMIN — THEOPHYLLINE 100.27 MG: 80 SOLUTION ORAL at 09:03

## 2019-03-13 RX ADMIN — DICLOFENAC 2 G: 10 GEL TOPICAL at 05:03

## 2019-03-13 RX ADMIN — PIPERACILLIN AND TAZOBACTAM 4.5 G: 4; .5 INJECTION, POWDER, LYOPHILIZED, FOR SOLUTION INTRAVENOUS; PARENTERAL at 09:03

## 2019-03-13 RX ADMIN — PREDNISONE 10 MG: 10 TABLET ORAL at 09:03

## 2019-03-13 RX ADMIN — DOCUSATE SODIUM 50 MG: 50 CAPSULE, LIQUID FILLED ORAL at 09:03

## 2019-03-13 RX ADMIN — PIPERACILLIN AND TAZOBACTAM 4.5 G: 4; .5 INJECTION, POWDER, LYOPHILIZED, FOR SOLUTION INTRAVENOUS; PARENTERAL at 02:03

## 2019-03-13 RX ADMIN — HEPARIN SODIUM 5000 UNITS: 5000 INJECTION, SOLUTION INTRAVENOUS; SUBCUTANEOUS at 01:03

## 2019-03-13 RX ADMIN — URSODIOL 500 MG: 250 TABLET, FILM COATED ORAL at 08:03

## 2019-03-13 RX ADMIN — TRAMADOL HYDROCHLORIDE 50 MG: 50 TABLET, COATED ORAL at 04:03

## 2019-03-13 RX ADMIN — HEPARIN SODIUM 5000 UNITS: 5000 INJECTION, SOLUTION INTRAVENOUS; SUBCUTANEOUS at 06:03

## 2019-03-13 RX ADMIN — GABAPENTIN 300 MG: 300 CAPSULE ORAL at 08:03

## 2019-03-13 RX ADMIN — DULOXETINE 60 MG: 60 CAPSULE, DELAYED RELEASE ORAL at 09:03

## 2019-03-13 RX ADMIN — TRAMADOL HYDROCHLORIDE 50 MG: 50 TABLET, COATED ORAL at 09:03

## 2019-03-13 RX ADMIN — CLOPIDOGREL 75 MG: 75 TABLET, FILM COATED ORAL at 09:03

## 2019-03-13 RX ADMIN — Medication 1 G: at 05:03

## 2019-03-13 RX ADMIN — IPRATROPIUM BROMIDE AND ALBUTEROL SULFATE 3 ML: .5; 3 SOLUTION RESPIRATORY (INHALATION) at 07:03

## 2019-03-13 RX ADMIN — TRAMADOL HYDROCHLORIDE 50 MG: 50 TABLET, COATED ORAL at 01:03

## 2019-03-13 RX ADMIN — HEPARIN SODIUM 5000 UNITS: 5000 INJECTION, SOLUTION INTRAVENOUS; SUBCUTANEOUS at 09:03

## 2019-03-13 RX ADMIN — PANTOPRAZOLE SODIUM 40 MG: 40 GRANULE, DELAYED RELEASE ORAL at 09:03

## 2019-03-13 RX ADMIN — Medication 1 G: at 03:03

## 2019-03-13 RX ADMIN — IPRATROPIUM BROMIDE AND ALBUTEROL SULFATE 3 ML: .5; 3 SOLUTION RESPIRATORY (INHALATION) at 03:03

## 2019-03-13 RX ADMIN — FLUTICASONE FUROATE AND VILANTEROL TRIFENATATE 1 PUFF: 100; 25 POWDER RESPIRATORY (INHALATION) at 09:03

## 2019-03-13 NOTE — PT/OT/SLP PROGRESS
Physical Therapy      Patient Name:  Sheryl Martines   MRN:  [id number]    Patient not seen today secondary to (pt. working with speech therapy on first attempt and unable to return for second attempt). Will follow-up tomorrow.    Sudhakar Light, PT   3/13/2019

## 2019-03-13 NOTE — PROGRESS NOTES
[facility] Hospital Medicine                                                                     Progress Note     Team: [facility] HOSP MED A Rosa Juan MD   Admit Date: 1/23/2019   Hospital Day: 48  TAMIKO: 3/14/2019   Code status: Full Code   Principal Problem: Septic shock     SUMMARY:     Patient is a 63 y.o. with chronic diastolic heart failure, COPD on home oxygen at 2 liters, chronic debility, CAD, Type 2 diabetes, HTN, previous CVA with residual right sided weakness, prior appendectomy in 8/2018 complicated by C. Diff infection, failure to thrive admitted to the hospital on 1/23/2019 with nausea vomiting and abdominal pain found to have fluid collection in R hemipelvis. Started on vanc/zosyn. Surgery consulted, s/p ex lap, washout and pelvic abscess drainage 1/25. Post-op course complicated by septic shock secondary to MRSA bacteremia for which she received 2 weeks of IV vancomycin and briefly required pressors.  Wound culture also grew Bacteroides and patient completed 4 weeks of Cipro and Flagyl.  Patient remained intubated postoperatively and was successfully extubated on 01/28.  Initially, she was stabilized and transferred to the floor.  She failed speech language therapy and was started on TPN with subsequent placement of IR guided G-tube in transition to tube feeds on 02/10.  On the floor, patient was making some progress and being followed by [facility]; however, a rapid response was called on 02/19 due to somnolence.  ABG was consistent with acute hypercapnic respiratory failure, and she was placed on BiPAP and transferred back to SICU.  Infectious workup was significant for a UA with 39 WBCs, moderate bacteria, and moderate yeast; urine culture ultimately grew Candida for which she was started on Diflucan IV.  She was also found to have markedly  elevated alk-phos, AST, and ALT.  Hepatology was consulted and felt acute liver injury related to infection versus medication, and not underlying liver disease. And ammonia was checked and was elevated, but hepatology recommended against treatment as they felt patient did not have hepatic encephalopathy.  Right upper quadrant ultrasound showed biliary sludge and gallbladder thickening but was negative for acute cholecystitis.  A subsequent HIDA scan was not consistent with acute cholecystitis; no surgical intervention needed per General surgery evaluation.  Patient's mental status improved in the ICU and is now on nasal cannula oxygen and BiPAP at night.  Medicine initially consulted for assistance with management and noted patient to be markedly fluid overloaded, estimated to be approximately net positive 16 L with this admission.  She had elevated CVP and TTE was consistent volume overload as well as her BNP of > 4900.  She was started on IV diuresis with Lasix with improvement in urine output.  She is to be stepped down to Hospital Medicine for further management and ultimately skilled nursing facility placement.    3/9 - Transfer back to MICU for SEptic Shock requiring vasopressor support - etiology is RLL Pneumonia noted on CT Abdomen/pelvis, no new intraabdominal source of infection. She improved and was weaned back down to home  2L nasal cannula. Respiratory cultures grew Acinetobacter and Staph. Initially patient and family wanted to go to [facility] but now interested in [facility].    SUBJECTIVE:   Pt seen and examined at bedside. She reports uneventful night. She is not eating much because she reports being full from tube feeds. Discussed with her daughter at bedside, updated on POC. Respiratory cultures, waiting sensitivities.       Review of Systems   Constitutional: Negative for chills and fever.   Respiratory: Positive for cough. Negative for wheezing.    Cardiovascular: Negative for chest pain.    Gastrointestinal: Negative for abdominal pain and vomiting.   Neurological: Positive for weakness.       OBJECTIVE:     Vitals:  Temp:  [97.6 °F (36.4 °C)-98.3 °F (36.8 °C)]   Pulse:  [65-98]   Resp:  [12-20]   BP: (149-185)/(74-92)   SpO2:  [93 %-100 %]      I & O (Last 24H):     Intake/Output Summary (Last 24 hours) at 3/13/2019 1001  Last data filed at 3/13/2019 0500  Gross per 24 hour   Intake 1550 ml   Output --   Net 1550 ml       Physical Exam   Constitutional: She is oriented to person, place, and time. No distress.   Neck: No JVD present.   Cardiovascular: Normal rate and regular rhythm.   Murmur heard.  Pulmonary/Chest: Effort normal. No respiratory distress. She has no wheezes.   Decreased Right Mid-Basilar Breath sounds  2L nasal cannula   Abdominal: Soft. Bowel sounds are normal. There is no tenderness.   PEG in place. Surrounding skin clean and dry   Lymphadenopathy:     She has no cervical adenopathy.   Neurological: She is alert and oriented to person, place, and time.   Skin: Skin is warm and dry.   Psychiatric: She has a normal mood and affect. Her behavior is normal. Thought content normal.   Nursing note and vitals reviewed.      All recent labs and imaging has been reviewed.     Recent Results (from the past 24 hour(s))   POCT glucose    Collection Time: 03/12/19 12:11 PM   Result Value Ref Range    POCT Glucose 147 (H) 70 - 110 mg/dL   POCT glucose    Collection Time: 03/12/19  4:58 PM   Result Value Ref Range    POCT Glucose 147 (H) 70 - 110 mg/dL   POCT glucose    Collection Time: 03/12/19  9:16 PM   Result Value Ref Range    POCT Glucose 116 (H) 70 - 110 mg/dL   POCT glucose    Collection Time: 03/13/19  4:16 AM   Result Value Ref Range    POCT Glucose 53 (L) 70 - 110 mg/dL   POCT glucose    Collection Time: 03/13/19  4:19 AM   Result Value Ref Range    POCT Glucose 83 70 - 110 mg/dL   Comprehensive Metabolic Panel (CMP)    Collection Time: 03/13/19  5:22 AM   Result Value Ref Range     Sodium 132 (L) 136 - 145 mmol/L    Potassium 3.4 (L) 3.5 - 5.1 mmol/L    Chloride 94 (L) 95 - 110 mmol/L    CO2 29 23 - 29 mmol/L    Glucose 67 (L) 70 - 110 mg/dL    BUN, Bld 17 8 - 23 mg/dL    Creatinine 0.5 0.5 - 1.4 mg/dL    Calcium 9.2 8.7 - 10.5 mg/dL    Total Protein 6.0 6.0 - 8.4 g/dL    Albumin 2.2 (L) 3.5 - 5.2 g/dL    Total Bilirubin 2.8 (H) 0.1 - 1.0 mg/dL    Alkaline Phosphatase 678 (H) 55 - 135 U/L     (H) 10 - 40 U/L     (H) 10 - 44 U/L    Anion Gap 9 8 - 16 mmol/L    eGFR if African American >60.0 >60 mL/min/1.73 m^2    eGFR if non African American >60.0 >60 mL/min/1.73 m^2   CBC auto differential    Collection Time: 03/13/19  5:22 AM   Result Value Ref Range    WBC 10.83 3.90 - 12.70 K/uL    RBC 2.90 (L) 4.00 - 5.40 M/uL    Hemoglobin 9.0 (L) 12.0 - 16.0 g/dL    Hematocrit 29.8 (L) 37.0 - 48.5 %     (H) 82 - 98 fL    MCH 31.0 27.0 - 31.0 pg    MCHC 30.2 (L) 32.0 - 36.0 g/dL    RDW 16.9 (H) 11.5 - 14.5 %    Platelets 235 150 - 350 K/uL    MPV 11.7 9.2 - 12.9 fL    Immature Granulocytes 0.6 (H) 0.0 - 0.5 %    Gran # (ANC) 6.6 1.8 - 7.7 K/uL    Immature Grans (Abs) 0.06 (H) 0.00 - 0.04 K/uL    Lymph # 2.7 1.0 - 4.8 K/uL    Mono # 1.0 0.3 - 1.0 K/uL    Eos # 0.5 0.0 - 0.5 K/uL    Baso # 0.01 0.00 - 0.20 K/uL    nRBC 0 0 /100 WBC    Gran% 61.1 38.0 - 73.0 %    Lymph% 24.7 18.0 - 48.0 %    Mono% 9.3 4.0 - 15.0 %    Eosinophil% 4.2 0.0 - 8.0 %    Basophil% 0.1 0.0 - 1.9 %    Differential Method Automated    Magnesium    Collection Time: 03/13/19  5:22 AM   Result Value Ref Range    Magnesium 1.3 (L) 1.6 - 2.6 mg/dL   Phosphorus    Collection Time: 03/13/19  5:22 AM   Result Value Ref Range    Phosphorus 2.0 (L) 2.7 - 4.5 mg/dL       Recent Labs   Lab 03/12/19  0822 03/12/19  1211 03/12/19  1658 03/12/19  2116 03/13/19  0416 03/13/19  0419   POCTGLUCOSE 124* 147* 147* 116* 53* 83        Active Hospital Problems    Diagnosis  POA    *Septic shock [A41.9, R65.21]  No    Wide nasal bridge  [Q30.8]  Not Applicable    Right lower lobe pneumonia [J18.1]  No    Proctitis [K62.89]  No    Adrenal insufficiency [E27.40]  No    Chronic respiratory failure with hypoxia and hypercapnia [J96.11, J96.12]  Yes    Hypomagnesemia [E83.42]  Yes    Hypophosphatemia [E83.39]  Yes    Other specified anemias [D64.89]  Yes    Hypokalemia [E87.6]  Yes    Generalized abdominal pain [R10.84]  Yes    Abnormal thyroid function test [R94.6]  Yes    Urinary incontinence without sensory awareness [N39.42]  Yes    Elevated liver enzymes [R74.8]  No    On enteral nutrition [Z78.9]  No    Alteration in skin integrity due to moisture [R23.9]  Yes    Dysphagia [R13.10]  No    Multiple skin tears [T14.8XXA]  Yes    Alteration in skin integrity related to surgical incision [R23.9]  Yes    Depression [F32.9]  Yes    Pelvic abscess in female [N73.9]  Yes    Debility [R53.81]  Yes    Severe malnutrition [E43]  Yes    Moderate asthma without complication [J45.909]  Yes    Type 2 diabetes mellitus without complication, without long-term current use of insulin [E11.9]  Yes    CVA, old, hemiparesis [I69.359]  Not Applicable    Coronary artery disease involving native coronary artery of native heart without angina pectoris [I25.10]  Yes      Resolved Hospital Problems    Diagnosis Date Resolved POA    Bradycardia [R00.1] 03/10/2019 No    Acute respiratory failure with hypoxia and hypercarbia [J96.01, J96.02] 03/10/2019 No    Acute cystitis [N30.00] 02/26/2019 Yes    Idioventricular rhythm [I44.2] 03/10/2019 Yes    Hypophosphatemia [E83.39] 02/21/2019 No    Severe sepsis [A41.9, R65.20] 02/02/2019 No    Acute renal failure superimposed on stage 3 chronic kidney disease [N17.9, N18.3] 02/24/2019 Yes    Hypomagnesemia [E83.42] 01/25/2019 Yes    Acute metabolic encephalopathy [G93.41] 03/10/2019 Yes    Essential hypertension [I10] 02/20/2019 Yes    Acute on chronic diastolic (congestive) heart failure [I50.33]  03/10/2019 Yes          ASSESSMENT AND PLAN:     Septic shock  Right Lower Lobe Pneumonia   -Blood Cultures show No growth  -s/p 1day MICU stay, repeat ICU txfr.   -Stress Dose steroids weaned for patient to resume chronic prednisone by 3/12  -Sputum Culture with Acinetobacter and Staph, final culture/sensitivites pending  -Continue empiric Vanc and Zosyn       Pelvic Abscesses/Generalized abdominal pain  Due to recent Pelvic abscess in female  CT abdomen showed a 4 x 3 cm enhancing fluid collection consistent with abscess.  She also had elevated lactate of 6.8.  She was admitted to the STICU service (Dr. Laurent) for initial management. Patient taken to OR on 01/25 for ex lap with drainage of abscess.  Postoperative course complicated by septic shock secondary to MRSA bacteremia for which she received 2 weeks of IV vancomycin and briefly required pressors, which have now been weaned off.  Wound culture also grew Bacteroides and patient completed 4 weeks of Cipro and Flagyl.  Patient remained intubated postoperatively and was successfully extubated on 01/28.  Initially, she was stabilized and transferred to the floor.     On the floor, patient was making some progress and being followed by [facility]; however, a rapid response was called on 02/19 due to somnolence.  ABG was consistent with acute hypercapnic respiratory  failure, and she was placed on BiPAP and transferred back to SICU.  Infectious workup was significant for a UA with 39 WBCs, moderate bacteria, and moderate yeast; urine culture ultimately grew Candida for which she was started on Diflucan IV.    3/3 surgery looked at wounds and deferred to wound care    Elevated liver enzymes - improving   She was also found to have markedly elevated alk-phos, AST, and ALT.  Hepatology was consulted and felt acute liver injury related to infection versus medication, and not underlying liver disease. And ammonia was checked and was elevated, but hepatology  recommended against treatment as they felt patient did not have hepatic encephalopathy.  Right upper quadrant ultrasound showed biliary sludge and gallbladder thickening but was negative for acute cholecystitis.  A subsequent HIDA scan was not consistent with acute cholecystitis; no surgical intervention needed per General surgery evaluation.    - re-consulted hepatology 2/27 who thought this could be drug induced.  DO not use cipro.  Recommend a trial of ursodiol 600mg BID to help with cholestasis with peak Bili of 12.  If not better in two weeks may need biopsy.   - diarrhea may be bile induced, monitor for improvement and consider cholestyramine if not better      Moderate asthma without complication/ Chronic Hypoxic & Hypercapnic respiratory failure  -Requires Nightly BIPAP   >Prone to hypercapnic narcosis  -Continue q4WAKE - Duo Nebs.    . Steroid dependant asthma - chronically on Prednisone 10mg.   - wean o2  - bipap qhs and prn for AMS   - c/w daily breo, theophylline,     Debility  Mostly wheelchair bound, but able to work with [facility]  using walker  - Family requesting to go [facility] with [facility]    Dysphagia/Severe Malnutrition   She failed speech language therapy and was started on TPN with subsequent placement of IR guided G-tube in transition to tube feeds on 02/10. Surgery replaced tube 3/2 when pt pulled it out.  - c/w tube feeds  - SLP saw pt 3/12. Advance to mechanical soft with thin liquids     Coronary artery disease involving native coronary artery of native heart without angina pectoris  - c/w ASA, plavix, statin  - no anginal equivalent    Type 2 diabetes mellitus without complication, without long-term current use of insulin  -- acceptable; continue with current treatment & monitor         Resolved Issues   Acute on chronic diastolic (congestive) heart failure - resolved  Patient's mental status improved in the ICU and is now on nasal cannula oxygen and BiPAP at night.  Medicine initially consulted  2/22 for assistance with management and noted patient to be markedly fluid overloaded, estimated to be approximately net positive 16 L with this admission.  Weight had increased from 71-75kg to 87kg.   She had elevated CVP and TTE was consistent volume overload as well as her BNP of > 4900.     2/22 IV diuresis with Lasix 40 bid  with improvement in urine output.  3/2 weight down to 73kg which is near her dry weight per epic but BNP was 2600;   -3/5 switched to PO lasix as her weight is now 66kg and exam is improved.  DTR states that patient at home is prone to dehydration with lasix so it is not used daily.  - Lasix PO every other day for now    Hypomagnesemia - resolved  Acute respiratory failure with hypoxia and hypercarbia - resolved  Bradycardia - resolved    Prophylaxis- Hep TID    Code Status- Full Code     Discharge plan and follow up - [facility] pending culture results          Rosa Juan MD  Attending Physician  [facility] Medicine Dept.

## 2019-03-13 NOTE — PLAN OF CARE
Problem: Adult Inpatient Plan of Care  Goal: Plan of Care Review   03/12/19 5770   Plan of Care Review   Plan of Care Reviewed With patient;family   Progress improving   POC reviewed at bedside with patient and daughter. NPO restriction removed due to SLP evaluation passed. PEG tube feeding slowed to 30ml/hr. PRN pain med given x2 for complaints of RLE pain with moderate relief achieved. Safety maintained. Bed in low and locked position. Call light within reach. Side rails up x2. Frequent rounds.

## 2019-03-13 NOTE — PLAN OF CARE
Problem: Adult Inpatient Plan of Care  Goal: Plan of Care Review  Outcome: Ongoing (interventions implemented as appropriate)  Pt afebrile, free of falls. Remains on tube feed at 30cc/hr, tolerating well. Diet advanced at change of shift, pt tolerated approximately 75% of dinner. Accu-checks q4h. Wound care performed. 250cc water bolus given per orders. Pt c/o pain managed with PRN tramadol, mild-moderate relief noted. WCTM.

## 2019-03-13 NOTE — PLAN OF CARE
Problem: SLP Goal  Goal: SLP Goal  Speech-Language Pathology Goals  Goals expected to be met by 3/19:   1) Pt will tolerate mechanical soft solids and thin liquids with no overt clinical signs aspiration.   Outcome: Ongoing (interventions implemented as appropriate)    Recommend ongoing mechanical soft solids and thin liquids. Good progress toward SLP goals. However should pt experience dec'd LIANNA, NPO warranted.     ALEXANDER Viramontes, CCC-SLP  [phone]  3/13/2019

## 2019-03-13 NOTE — PT/OT/SLP PROGRESS
"Speech Language Pathology Treatment    Patient Name:  Sheryl Martines   MRN:  [id number]   [id number] A    Admitting Diagnosis: Septic shock    Recommendations:                 General Recommendations:  Dysphagia therapy  Diet recommendations:  Mechanical soft, Liquid Diet Level: Thin   Aspiration Precautions:   · Fully awake and alert for PO intake- should pt experience dec'd LIANNA, NPO warranted.   · Fully upright position for PO intake  · Small bites/ sips  · Slow rate of eating/ drinking  · 1 bite/ sip @ a time  · Refrain from talking prior to swallow completion   · Discontinue PO upon: coughing, throat clearing, choking, wet vocal quality, wet breath sounds, watery eyes, reddened facial features  General Precautions: Standard, aspiration, fall, respiratory  Communication strategies:  go to room if call light pushed    Subjective     "I don't feel bad."     Pain/Comfort:  · Pain Rating 1: 0/10  · Pain Rating Post-Intervention 1: 0/10    Objective:     Has the patient been evaluated by SLP for swallowing?   Yes  Keep patient NPO? No   Current Respiratory Status: nasal cannula      Per review of pt's medical chart prior to initiation of session, results of clinical re-evaluation of swallow completed yesterday, 3/12/19, remarkable for SLP recommendation for mechanical soft solids and thin liquids. However prior to yesterday's re-evaluation, NPO consistently recommended per results of SLP sessions provided since 1/30/19 2/2 lethargy, dec'd LIANNA, and /or appreciation of overt clinical signs aspiration when provided with PO trials at bedside.     Pt awake and alert upon entry. Re: her LIANNA, pt reported comparable to that during yesterday's clinical re-evaluation of swallow even though a "little more sleepy because I didn't sleep well last night." Pt observed with ~6oz thin water via cup sip x2 in isolation and multiple straw sips taken in isolation and as mechanical soft solid liquid wash and ~3/4 mechanical soft solid " "banana via multiple bites. Oral phase appeared WFL and no overt clinical signs aspiration observed. Regular consistency solid PO trials attempted to be provided. However pt refused, reported 2/2 "too dry." Education provided re: aspiration precautions listed above and ongoing SLP POC 2/2 course of pt's progress within SLP sessions as documented above. Pt verbalized understanding of education provided and agreement with SLP POC. White board updated. No further questions.     Results discussed with NSG who verbalized understanding of- and agreement with- SLP POC.     Assessment:     Sheryl Martines is a 63 y.o. female with an SLP diagnosis of risk for aspiration.     Goals:   Multidisciplinary Problems     SLP Goals        Problem: SLP Goal    Goal Priority Disciplines Outcome   SLP Goal     SLP Ongoing (interventions implemented as appropriate)   Description:  Speech-Language Pathology Goals  Goals expected to be met by 3/19:   1) Pt will tolerate mechanical soft solids and thin liquids with no overt clinical signs aspiration.               Multidisciplinary Problems (Resolved)        Problem: SLP Goal    Goal Priority Disciplines Outcome   SLP Goal   (Resolved)     SLP Outcome(s) achieved   Description:  Speech Language Pathology Goals  Goals expected to be met by 3/14/19  1. Pt will participate in further, instrumental assessment via MBSS when appropriate.   2. Educate Pt and family on S/S aspiration and aspiration precautions   3. Pt will complete dysphagia exercises x10 ea with good effort 90% of attempts to improve BOT coordination and pharyngeal strength/coordination.   4. Pt will independently complete simple problem solving tasks with 75% accuracy.  5. Pt will follow complex directions with 80% accuracy and min assist.  6. Pt will maintain alertness for entire session with min assist.  7. Pt will recall 3/4 unrelated objects with a 5-minute interval and min assist.  8. Pt will complete simple categorization " tasks with 75% accuracy and min assist.  9. Pt will participate in reading/writing/visuospatial/additional cognitive-linguistic assessment in order to determine most effective POC.                                Plan:     · Patient to be seen:  2 x/week   · Plan of Care expires:  04/10/19  · Plan of Care reviewed with:  patient   · SLP Follow-Up:  Yes       Discharge recommendations:  nursing facility, skilled     Time Tracking:     SLP Treatment Date:   03/13/19  Speech Start Time:  1130  Speech Stop Time:  1143     Speech Total Time (min):  13 min    Billable Minutes: Treatment Swallowing Dysfunction 13    ALEXANDER Viramontes, CCC-SLP  [phone]  3/13/2019

## 2019-03-13 NOTE — PLAN OF CARE
[facility]    HOME HEALTH ORDERS  FACE TO FACE ENCOUNTER    Patient Name: Sheryl Martines  YOB: 1955    PCP: Primary Doctor No   PCP Address: None  PCP Phone Number: None  PCP Fax: None    Encounter Date: 03/15/2019    Admit to Home Health    Diagnoses:  Active Hospital Problems    Diagnosis  POA    Pneumonia [J18.9]  No    Wide nasal bridge [Q30.8]  Not Applicable    Right lower lobe pneumonia [J18.1]  No    Proctitis [K62.89]  No    Adrenal insufficiency [E27.40]  No    Chronic respiratory failure with hypoxia and hypercapnia [J96.11, J96.12]  Yes    Other specified anemias [D64.89]  Yes    Generalized abdominal pain [R10.84]  Yes    Abnormal thyroid function test [R94.6]  Yes    Elevated liver enzymes [R74.8]  No    Alteration in skin integrity due to moisture [R23.9]  Yes    Multiple skin tears [T14.8XXA]  Yes    Alteration in skin integrity related to surgical incision [R23.9]  Yes    Depression [F32.9]  Yes    Debility [R53.81]  Yes    Severe malnutrition [E43]  Yes    Moderate asthma without complication [J45.909]  Yes    Type 2 diabetes mellitus without complication, without long-term current use of insulin [E11.9]  Yes    CVA, old, hemiparesis [I69.359]  Not Applicable    Coronary artery disease involving native coronary artery of native heart without angina pectoris [I25.10]  Yes      Resolved Hospital Problems    Diagnosis Date Resolved POA    *Septic shock [A41.9, R65.21] 03/13/2019 No    Hypomagnesemia [E83.42] 03/13/2019 Yes    Hypophosphatemia [E83.39] 03/13/2019 Yes    Hypokalemia [E87.6] 03/13/2019 Yes    Bradycardia [R00.1] 03/10/2019 No    Acute respiratory failure with hypoxia and hypercarbia [J96.01, J96.02] 03/10/2019 No    Acute cystitis [N30.00] 02/26/2019 Yes    Urinary incontinence without sensory awareness [N39.42] 03/15/2019 Yes    On enteral nutrition [Z78.9] 03/15/2019 No    Dysphagia [R13.10] 03/15/2019 No     Idioventricular rhythm [I44.2] 03/10/2019 Yes    Hypophosphatemia [E83.39] 02/21/2019 No    Pelvic abscess in female [N73.9] 03/13/2019 Yes    Severe sepsis [A41.9, R65.20] 02/02/2019 No    Acute renal failure superimposed on stage 3 chronic kidney disease [N17.9, N18.3] 02/24/2019 Yes    Hypomagnesemia [E83.42] 01/25/2019 Yes    Acute metabolic encephalopathy [G93.41] 03/10/2019 Yes    Essential hypertension [I10] 02/20/2019 Yes    Acute on chronic diastolic (congestive) heart failure [I50.33] 03/10/2019 Yes       No future appointments.        I have seen and examined this patient face to face today. My clinical findings that support the need for the home health skilled services and home bound status are the following:  Weakness/numbness causing balance and gait disturbance due to Weakness/Debility making it taxing to leave home.  Requiring assistive device to leave home due to unsteady gait caused by  Weakness/Debility.  Medical restrictions requiring assistance of another human to leave home due to  Unstable ambulation, Decreased range of motions in extremities, Newly placed G-tube/ostomy and Home oxygen requirement.    Allergies:  Review of patient's allergies indicates:   Allergen Reactions    Ace inhibitors Swelling    Carvedilol      Other reaction(s): Other (See Comments)  blister    Ciprofloxacin      Elevated liver enzymes    Hydralazine analogues     Metformin Nausea And Vomiting    Tetracycline Itching    Tetracyclines Swelling    Travatan (with benzalkonium) [travoprost (benzalkonium)]     Travoprost Itching     Other reaction(s): Other (See Comments)  Blurry vision       Diet: regular diet    Activities: activity as tolerated    Nursing:   SN to complete comprehensive assessment including routine vital signs. Instruct on disease process and s/s of complications to report to MD. Review/verify medication list sent home with the patient at time of discharge  and instruct  patient/caregiver as needed. Frequency may be adjusted depending on start of care date.    Notify MD if SBP > 160 or < 90; DBP > 90 or < 50; HR > 120 or < 50; Temp > 101      CONSULTS:    Physical Therapy to evaluate and treat. Evaluate for home safety and equipment needs; Establish/upgrade home exercise program. Perform / instruct on therapeutic exercises, gait training, transfer training, and Range of Motion.  Occupational Therapy to evaluate and treat. Evaluate home environment for safety and equipment needs. Perform/Instruct on transfers, ADL training, ROM, and therapeutic exercises.  Speech Therapy  to evaluate and treat for  Swallowing.  Aide to provide assistance with personal care, ADLs, and vital signs.    MISCELLANEOUS CARE:  PEG Care:  Instruct patient/caregiver to clean site.  Monitor skin integrity.  Routine Skin for Bedridden Patients: Instruct patient/caregiver to apply moisture barrier cream to all skin folds and wet areas in perineal area daily and after baths and all bowel movements.  Home Oxygen:  Oxygen at 2-3 L/min nasal canula to be used:  Continuously.    WOUND CARE ORDERS    RLQ and Midline incision - Cleanse  RLQ incision and midline incision wound with sterile normal saline. Apply Sensicare no sting skin prep to periwound. Apply Triad ointment to kerlix then lightly pack into wound.  Secure with bordered foam dressing.    Buttocks-  Apply Critic Aid Clear (Purple top) Barrier cream BID and prn cleaning.        Medications: Review discharge medications with patient and family and provide education.       Sheryl Martines   Home Medication Instructions LOTTIE:[id number]    Printed on:03/15/19 1240   Medication Information                      acetaminophen (TYLENOL) 500 MG tablet  Take 1,000 mg by mouth 2 (two) times daily as needed for Pain.             albuterol (PROVENTIL/VENTOLIN HFA) 90 mcg/actuation inhaler  Inhale 2 puffs into the lungs every 6 (six) hours as needed for Wheezing or  Shortness of Breath. Rescue             albuterol-ipratropium (DUO-NEB) 2.5 mg-0.5 mg/3 mL nebulizer solution  Take 3 mLs by nebulization every 4 (four) hours as needed for Wheezing or Shortness of Breath. Rescue              aspirin (ECOTRIN) 81 MG EC tablet  Take 1 tablet (81 mg total) by mouth once daily.             clopidogrel (PLAVIX) 75 mg tablet  Take 75 mg by mouth once daily.             diclofenac sodium (VOLTAREN) 1 % Gel  Apply 2 g topically daily as needed (PAIN).             DULoxetine (CYMBALTA) 60 MG capsule  Take 60 mg by mouth once daily.             fluticasone-vilanterol (BREO ELLIPTA) 100-25 mcg/dose diskus inhaler  Inhale 1 puff into the lungs once daily. Controller             gabapentin (NEURONTIN) 300 MG capsule  Take 300 mg by mouth once daily.             Lactobacillus rhamnosus-fiber 2.5 billion cell-3.5 gram PwPk  Take 1 capsule by mouth.             lansoprazole (PREVACID) 30 MG capsule  Take 30 mg by mouth once daily.             lidocaine (XYLOCAINE) 5 % Oint ointment  Apply topically every 6 (six) hours as needed (Knee pain).             losartan (COZAAR) 100 MG tablet  Take 100 mg by mouth once daily.              ondansetron (ZOFRAN) 4 MG tablet  Take 1 tablet (4 mg total) by mouth every 6 (six) hours as needed.             predniSONE (DELTASONE) 10 MG tablet  Take 4 tablets (40 mg) on 12/16, then take 1 tablet (10 mg) daily             pyridoxine, vitamin B6, (VITAMIN B-6) 50 MG Tab  Take 1 tablet (50 mg total) by mouth once daily.             simvastatin (ZOCOR) 40 MG tablet  Take 40 mg by mouth.             sulfamethoxazole-trimethoprim 800-160mg (BACTRIM DS) 800-160 mg Tab  Take 1 tablet by mouth 2 (two) times daily. for 14 days             theophylline (THEODUR) 300 mg 24 hr capsule  Take 300 mg by mouth once daily.             traMADol (ULTRAM) 50 mg tablet  TK 1 T PO BID PRN               I certify that this patient is confined to her home and needs intermittent skilled  nursing care, physical therapy, speech therapy and occupational therapy.

## 2019-03-14 PROBLEM — J18.9 PNEUMONIA: Status: ACTIVE | Noted: 2019-03-14

## 2019-03-14 LAB
ALBUMIN SERPL BCP-MCNC: 2.2 G/DL
ALP SERPL-CCNC: 659 U/L
ALT SERPL W/O P-5'-P-CCNC: 100 U/L
ANION GAP SERPL CALC-SCNC: 9 MMOL/L
AST SERPL-CCNC: 100 U/L
BASOPHILS # BLD AUTO: 0.02 K/UL
BASOPHILS NFR BLD: 0.2 %
BILIRUB SERPL-MCNC: 2.7 MG/DL
BUN SERPL-MCNC: 13 MG/DL
CALCIUM SERPL-MCNC: 9.2 MG/DL
CHLORIDE SERPL-SCNC: 95 MMOL/L
CO2 SERPL-SCNC: 29 MMOL/L
CREAT SERPL-MCNC: 0.5 MG/DL
DIFFERENTIAL METHOD: ABNORMAL
EOSINOPHIL # BLD AUTO: 0.5 K/UL
EOSINOPHIL NFR BLD: 4.9 %
ERYTHROCYTE [DISTWIDTH] IN BLOOD BY AUTOMATED COUNT: 17 %
EST. GFR  (AFRICAN AMERICAN): >60 ML/MIN/1.73 M^2
EST. GFR  (NON AFRICAN AMERICAN): >60 ML/MIN/1.73 M^2
GLUCOSE SERPL-MCNC: 70 MG/DL
HCT VFR BLD AUTO: 31.3 %
HGB BLD-MCNC: 9.5 G/DL
IMM GRANULOCYTES # BLD AUTO: 0.08 K/UL
IMM GRANULOCYTES NFR BLD AUTO: 0.8 %
LYMPHOCYTES # BLD AUTO: 2.7 K/UL
LYMPHOCYTES NFR BLD: 25.5 %
MAGNESIUM SERPL-MCNC: 1.5 MG/DL
MCH RBC QN AUTO: 30.5 PG
MCHC RBC AUTO-ENTMCNC: 30.4 G/DL
MCV RBC AUTO: 101 FL
MONOCYTES # BLD AUTO: 0.9 K/UL
MONOCYTES NFR BLD: 8.2 %
NEUTROPHILS # BLD AUTO: 6.4 K/UL
NEUTROPHILS NFR BLD: 60.4 %
NRBC BLD-RTO: 0 /100 WBC
PHOSPHATE SERPL-MCNC: 2.8 MG/DL
PLATELET # BLD AUTO: 236 K/UL
PMV BLD AUTO: 11.6 FL
POCT GLUCOSE: 111 MG/DL (ref 70–110)
POCT GLUCOSE: 139 MG/DL (ref 70–110)
POCT GLUCOSE: 70 MG/DL (ref 70–110)
POCT GLUCOSE: 82 MG/DL (ref 70–110)
POCT GLUCOSE: 86 MG/DL (ref 70–110)
POTASSIUM SERPL-SCNC: 3.2 MMOL/L
PREALB SERPL-MCNC: 21 MG/DL
PROT SERPL-MCNC: 5.8 G/DL
RBC # BLD AUTO: 3.11 M/UL
SODIUM SERPL-SCNC: 133 MMOL/L
VANCOMYCIN TROUGH SERPL-MCNC: 23.4 UG/ML
WBC # BLD AUTO: 10.56 K/UL

## 2019-03-14 PROCEDURE — 99223 1ST HOSP IP/OBS HIGH 75: CPT | Mod: ,,, | Performed by: INTERNAL MEDICINE

## 2019-03-14 PROCEDURE — 63600175 PHARM REV CODE 636 W HCPCS: Performed by: STUDENT IN AN ORGANIZED HEALTH CARE EDUCATION/TRAINING PROGRAM

## 2019-03-14 PROCEDURE — 99232 PR SUBSEQUENT HOSPITAL CARE,LEVL II: ICD-10-PCS | Mod: ,,, | Performed by: INTERNAL MEDICINE

## 2019-03-14 PROCEDURE — 80202 ASSAY OF VANCOMYCIN: CPT

## 2019-03-14 PROCEDURE — 97110 THERAPEUTIC EXERCISES: CPT

## 2019-03-14 PROCEDURE — 63600175 PHARM REV CODE 636 W HCPCS: Performed by: PHYSICIAN ASSISTANT

## 2019-03-14 PROCEDURE — 83735 ASSAY OF MAGNESIUM: CPT

## 2019-03-14 PROCEDURE — 25000003 PHARM REV CODE 250: Performed by: PHYSICIAN ASSISTANT

## 2019-03-14 PROCEDURE — 94664 DEMO&/EVAL PT USE INHALER: CPT

## 2019-03-14 PROCEDURE — 63600175 PHARM REV CODE 636 W HCPCS: Performed by: HOSPITALIST

## 2019-03-14 PROCEDURE — 97530 THERAPEUTIC ACTIVITIES: CPT

## 2019-03-14 PROCEDURE — 99223 PR INITIAL HOSPITAL CARE,LEVL III: ICD-10-PCS | Mod: ,,, | Performed by: INTERNAL MEDICINE

## 2019-03-14 PROCEDURE — 25000003 PHARM REV CODE 250: Performed by: HOSPITALIST

## 2019-03-14 PROCEDURE — 25000003 PHARM REV CODE 250: Performed by: STUDENT IN AN ORGANIZED HEALTH CARE EDUCATION/TRAINING PROGRAM

## 2019-03-14 PROCEDURE — 36415 COLL VENOUS BLD VENIPUNCTURE: CPT

## 2019-03-14 PROCEDURE — 20600001 HC STEP DOWN PRIVATE ROOM

## 2019-03-14 PROCEDURE — 84100 ASSAY OF PHOSPHORUS: CPT

## 2019-03-14 PROCEDURE — 63600175 PHARM REV CODE 636 W HCPCS: Performed by: INTERNAL MEDICINE

## 2019-03-14 PROCEDURE — 94640 AIRWAY INHALATION TREATMENT: CPT

## 2019-03-14 PROCEDURE — 25000242 PHARM REV CODE 250 ALT 637 W/ HCPCS: Performed by: HOSPITALIST

## 2019-03-14 PROCEDURE — 25000003 PHARM REV CODE 250: Performed by: INTERNAL MEDICINE

## 2019-03-14 PROCEDURE — 27000221 HC OXYGEN, UP TO 24 HOURS

## 2019-03-14 PROCEDURE — 99900035 HC TECH TIME PER 15 MIN (STAT)

## 2019-03-14 PROCEDURE — 80053 COMPREHEN METABOLIC PANEL: CPT

## 2019-03-14 PROCEDURE — 94660 CPAP INITIATION&MGMT: CPT

## 2019-03-14 PROCEDURE — 85025 COMPLETE CBC W/AUTO DIFF WBC: CPT

## 2019-03-14 PROCEDURE — 94761 N-INVAS EAR/PLS OXIMETRY MLT: CPT

## 2019-03-14 PROCEDURE — 99232 SBSQ HOSP IP/OBS MODERATE 35: CPT | Mod: ,,, | Performed by: INTERNAL MEDICINE

## 2019-03-14 PROCEDURE — 84134 ASSAY OF PREALBUMIN: CPT

## 2019-03-14 RX ORDER — LOSARTAN POTASSIUM 50 MG/1
100 TABLET ORAL DAILY
Status: DISCONTINUED | OUTPATIENT
Start: 2019-03-14 | End: 2019-03-16 | Stop reason: HOSPADM

## 2019-03-14 RX ORDER — HYDROXYZINE HYDROCHLORIDE 25 MG/1
25 TABLET, FILM COATED ORAL EVERY 6 HOURS PRN
Status: DISCONTINUED | OUTPATIENT
Start: 2019-03-14 | End: 2019-03-16 | Stop reason: HOSPADM

## 2019-03-14 RX ORDER — POTASSIUM CHLORIDE 20 MEQ/1
20 TABLET, EXTENDED RELEASE ORAL ONCE
Status: COMPLETED | OUTPATIENT
Start: 2019-03-14 | End: 2019-03-14

## 2019-03-14 RX ADMIN — MAGNESIUM SULFATE HEPTAHYDRATE 1 G: 500 INJECTION, SOLUTION INTRAMUSCULAR; INTRAVENOUS at 10:03

## 2019-03-14 RX ADMIN — MEROPENEM 2 G: 1 INJECTION, POWDER, FOR SOLUTION INTRAVENOUS at 12:03

## 2019-03-14 RX ADMIN — GABAPENTIN 300 MG: 300 CAPSULE ORAL at 09:03

## 2019-03-14 RX ADMIN — IPRATROPIUM BROMIDE AND ALBUTEROL SULFATE 3 ML: .5; 3 SOLUTION RESPIRATORY (INHALATION) at 07:03

## 2019-03-14 RX ADMIN — URSODIOL 500 MG: 250 TABLET, FILM COATED ORAL at 09:03

## 2019-03-14 RX ADMIN — HEPARIN SODIUM 5000 UNITS: 5000 INJECTION, SOLUTION INTRAVENOUS; SUBCUTANEOUS at 09:03

## 2019-03-14 RX ADMIN — THEOPHYLLINE 100.27 MG: 80 SOLUTION ORAL at 09:03

## 2019-03-14 RX ADMIN — LOSARTAN POTASSIUM 100 MG: 50 TABLET, FILM COATED ORAL at 09:03

## 2019-03-14 RX ADMIN — Medication 1 G: at 05:03

## 2019-03-14 RX ADMIN — TRAMADOL HYDROCHLORIDE 50 MG: 50 TABLET, COATED ORAL at 08:03

## 2019-03-14 RX ADMIN — TRAMADOL HYDROCHLORIDE 50 MG: 50 TABLET, COATED ORAL at 09:03

## 2019-03-14 RX ADMIN — DULOXETINE 60 MG: 60 CAPSULE, DELAYED RELEASE ORAL at 08:03

## 2019-03-14 RX ADMIN — IPRATROPIUM BROMIDE AND ALBUTEROL SULFATE 3 ML: .5; 3 SOLUTION RESPIRATORY (INHALATION) at 12:03

## 2019-03-14 RX ADMIN — TRAMADOL HYDROCHLORIDE 50 MG: 50 TABLET, COATED ORAL at 01:03

## 2019-03-14 RX ADMIN — HEPARIN SODIUM 5000 UNITS: 5000 INJECTION, SOLUTION INTRAVENOUS; SUBCUTANEOUS at 05:03

## 2019-03-14 RX ADMIN — MEROPENEM 2 G: 1 INJECTION, POWDER, FOR SOLUTION INTRAVENOUS at 08:03

## 2019-03-14 RX ADMIN — PREDNISONE 10 MG: 10 TABLET ORAL at 08:03

## 2019-03-14 RX ADMIN — THEOPHYLLINE 100.27 MG: 80 SOLUTION ORAL at 08:03

## 2019-03-14 RX ADMIN — PIPERACILLIN AND TAZOBACTAM 4.5 G: 4; .5 INJECTION, POWDER, LYOPHILIZED, FOR SOLUTION INTRAVENOUS; PARENTERAL at 10:03

## 2019-03-14 RX ADMIN — DOCUSATE SODIUM 50 MG: 50 CAPSULE, LIQUID FILLED ORAL at 08:03

## 2019-03-14 RX ADMIN — POTASSIUM CHLORIDE 20 MEQ: 1500 TABLET, EXTENDED RELEASE ORAL at 10:03

## 2019-03-14 RX ADMIN — ASPIRIN 81 MG CHEWABLE TABLET 81 MG: 81 TABLET CHEWABLE at 08:03

## 2019-03-14 RX ADMIN — PANTOPRAZOLE SODIUM 40 MG: 40 GRANULE, DELAYED RELEASE ORAL at 09:03

## 2019-03-14 RX ADMIN — FLUTICASONE FUROATE AND VILANTEROL TRIFENATATE 1 PUFF: 100; 25 POWDER RESPIRATORY (INHALATION) at 09:03

## 2019-03-14 RX ADMIN — HEPARIN SODIUM 5000 UNITS: 5000 INJECTION, SOLUTION INTRAVENOUS; SUBCUTANEOUS at 03:03

## 2019-03-14 RX ADMIN — CLOPIDOGREL 75 MG: 75 TABLET, FILM COATED ORAL at 08:03

## 2019-03-14 RX ADMIN — IPRATROPIUM BROMIDE AND ALBUTEROL SULFATE 3 ML: .5; 3 SOLUTION RESPIRATORY (INHALATION) at 04:03

## 2019-03-14 RX ADMIN — PIPERACILLIN AND TAZOBACTAM 4.5 G: 4; .5 INJECTION, POWDER, LYOPHILIZED, FOR SOLUTION INTRAVENOUS; PARENTERAL at 01:03

## 2019-03-14 NOTE — PLAN OF CARE
Problem: Adult Inpatient Plan of Care  Goal: Plan of Care Review  Outcome: Ongoing (interventions implemented as appropriate)  POC reviewed with the pt and family. BS wnl. V/S stable. Pt AAO x 4. Feedings continuous @ 20mL/hr. Will continue to monitor.

## 2019-03-14 NOTE — PLAN OF CARE
Problem: Physical Therapy Goal  Goal: Physical Therapy Goal  Goals to be met by:3/25/19    Patient will increase functional independence with mobility by performin. Supine to sit with Moderate Assistance - met  2. Sit to stand transfer with Maximum Assistance - met       Revised: Sit to stand transfer with Moderate Assistance - not met  3. Bed to chair transfer with Maximum Assistance - met       Revised: Bed to chair transfer with Moderate Assistance - not met  4. Sitting at edge of bed x10 minutes with Contact Guard Assistance - not met  5. Lower extremity exercise program x10 reps, with assistance as needed - not met           Outcome: Ongoing (interventions implemented as appropriate)  Pt. Progressing towards goals

## 2019-03-14 NOTE — PLAN OF CARE
Pt's family refused LTAC and stated will take pt home w/ h/h. Pt's d/c is pending ID consult - early notes seem that pt will d/c w/ IV Vanc therefore pt will need PICC and referral to h/h and infusion company. Pt will likely d/c tmw.     03/14/19 6671   Discharge Reassessment   Assessment Type Discharge Planning Reassessment   Discharge Plan A Home with family;Home Health   Discharge Plan B Skilled Nursing Facility   Anticipated Discharge Disposition Home-Health   Can the patient answer the patient profile reliably? No, cognitively impaired   How does the patient rate their overall health at the present time? Poor   Describe the patient's ability to walk at the present time. Major restrictions/daily assistance from another person   Post-Acute Status   Post-Acute Authorization Home Health/Hospice  (will need h/h and IV infusion - awaiting orders and PICC)

## 2019-03-14 NOTE — PT/OT/SLP PROGRESS
Physical Therapy Treatment    Patient Name:  Sheryl Martines   MRN:  [id number]    Recommendations:     Discharge Recommendations:  nursing facility, skilled   Discharge Equipment Recommendations: hospital bed   Barriers to discharge: None    Assessment:     Sheryl Martines is a 63 y.o. female admitted with a medical diagnosis of Septic shock.  She presents with the following impairments/functional limitations:  weakness, impaired endurance, impaired functional mobilty, gait instability, impaired balance, impaired self care skills, decreased lower extremity function, decreased upper extremity function, decreased safety awareness Pt. cooperative and tolerated treatment well. Pt. progressing with mobility, but still requires significant assistance for transfers.    Rehab Prognosis: Fair; patient would benefit from acute skilled PT services to address these deficits and reach maximum level of function.    Recent Surgery: Procedure(s) (LRB):  LAPAROTOMY, EXPLORATORY (N/A)  INCISION AND DRAINAGE, ABSCESS-  drainage of pelvic abscess (N/A)  EXCISION, ABSCESS- drainage abdominal wall abscess (N/A)  APPLICATION, WOUND VAC (N/A) 48 Days Post-Op    Plan:     During this hospitalization, patient to be seen 3 x/week to address the identified rehab impairments via gait training, therapeutic activities, therapeutic exercises and progress toward the following goals:    · Plan of Care Expires:  03/24/19    Subjective     Chief Complaint: weakness  Patient/Family Comments/goals: to get stronger  Pain/Comfort:  ·        Objective:     Communicated with nursing prior to session.  Patient found supine in bed oxygen, peripheral IV, PEG Tube, PureWick, telemetry  upon PT entry to room.     General Precautions: Standard, aspiration, fall, respiratory   Orthopedic Precautions:N/A   Braces:       Functional Mobility:  · Bed Mobility:     · Rolling Right: stand by assistance  · Scooting: contact guard assistance  · Supine to Sit: contact  guard assistance  · Transfers:     · Bed to Chair: maximal assistance with  hand-held assist  using  Squat Pivot  · Balance: fair sitting and poor standing      AM-PAC 6 CLICK MOBILITY  Turning over in bed (including adjusting bedclothes, sheets and blankets)?: 3  Sitting down on and standing up from a chair with arms (e.g., wheelchair, bedside commode, etc.): 2  Moving from lying on back to sitting on the side of the bed?: 3  Moving to and from a bed to a chair (including a wheelchair)?: 2  Need to walk in hospital room?: 1  Climbing 3-5 steps with a railing?: 1  Basic Mobility Total Score: 12       Therapeutic Activities and Exercises:   Discussed pt.'s progress, goals, and POC.    Patient left up in chair with all lines intact and call button in reach..    PT returned in an hour to get pt. BTB with Max A. Pt. positioned for comfort.    GOALS:   Multidisciplinary Problems     Physical Therapy Goals        Problem: Physical Therapy Goal    Goal Priority Disciplines Outcome Goal Variances Interventions   Physical Therapy Goal     PT, PT/OT Ongoing (interventions implemented as appropriate)     Description:  Goals to be met by:3/25/19    Patient will increase functional independence with mobility by performin. Supine to sit with Moderate Assistance - met  2. Sit to stand transfer with Maximum Assistance - met       Revised: Sit to stand transfer with Moderate Assistance - not met  3. Bed to chair transfer with Maximum Assistance - met       Revised: Bed to chair transfer with Moderate Assistance - not met  4. Sitting at edge of bed x10 minutes with Contact Guard Assistance - not met  5. Lower extremity exercise program x10 reps, with assistance as needed - not met                             Time Tracking:     PT Received On: 19  PT Start Time: 1113     PT Stop Time: 1128  PT Total Time (min): 15 min     Billable Minutes: Therapeutic Activity 15    Treatment Type: Treatment  PT/PTA: PT     PTA Visit  Number: 2     Sudhakar Light, PT  03/14/2019

## 2019-03-14 NOTE — PLAN OF CARE
Problem: Adult Inpatient Plan of Care  Goal: Plan of Care Review  Outcome: Ongoing (interventions implemented as appropriate)  VSS. No acute events.Tube feed 20 cc/hr. Accu-checks q4h. Wound care performed. 250cc water bolus given per orders. Pt c/o pain managed with PRN tramadol.Callbell placed within reach and use encouraged.     Problem: Skin Injury Risk Increased  Goal: Skin Health and Integrity  Outcome: Ongoing (interventions implemented as appropriate)  Specialty mattress in place. Heel boots maintained.

## 2019-03-14 NOTE — PLAN OF CARE
Problem: Occupational Therapy Goal  Goal: Occupational Therapy Goal  Goals to be met by: 3/12/19    Patient will increase functional independence with ADLs by performing:    Feeding with Set-up Assistance.  UE Dressing with Set-up Assistance.  LE Dressing with Moderate Assistance with AD as needed.  Grooming while seated with modified independence.  Toileting from bedside commode with Maximum Assistance for hygiene and clothing management.   Toilet transfer to bedside commode with Maximum Assistance.  Sitting EOB ~10 min with modified independence.        Outcome: Ongoing (interventions implemented as appropriate)  Updated POC as pt progressing well with EOB tolerance as well as grooming sitting. Continue OT POC as tolerated.  Annamarie Wang, OT  3/14/2019

## 2019-03-14 NOTE — SUBJECTIVE & OBJECTIVE
Past Medical History:   Diagnosis Date    Abnormal LFTs 12/14/2018    Closed compression fracture of fourth lumbar vertebra     Closed fracture of right tibia and fibula 10/3/2018    COPD (chronic obstructive pulmonary disease)     home O2    Coronary artery disease     Diabetes mellitus     Fall 10/4/2018    Glaucoma     High cholesterol     Hypertension     Infected incision 9/20/2018    Iritis     Pulmonary embolus     S/P appendectomy 9/11/2018    Stroke     rt sided weakness.       Past Surgical History:   Procedure Laterality Date    ABDOMINAL SURGERY      APPENDECTOMY N/A 8/26/2018    Performed by [facility]adine Melendrez MD at [facility] OR    APPENDECTOMY, LAPAROSCOPIC---CONVERTED TO OPEN APPENDECTOMY @0950 N/A 8/26/2018    Performed by [facility]adine [facility]rez MD at [facility] OR    APPLICATION, WOUND VAC N/A 1/25/2019    Performed by Monster Laurent MD at [facility] OR [address] [place] [address]    BACK SURGERY      stimulator    CATARACT EXTRACTION      EXCISION, ABSCESS- drainage abdominal wall abscess N/A 1/25/2019    Performed by Monster Laurent MD at [facility] OR [address] [place] [address]    HYSTERECTOMY      INCISION AND DRAINAGE, ABSCESS-  drainage of pelvic abscess N/A 1/25/2019    Performed by Monster Laurent MD at [facility] OR [address] [place] [address]    LAPAROTOMY, EXPLORATORY N/A 1/25/2019    Performed by Monster Laurent MD at [facility] OR [address] [place] [address]    TOTAL HIP ARTHROPLASTY Left     2008       Review of patient's allergies indicates:   Allergen Reactions    Ace inhibitors Swelling    Carvedilol      Other reaction(s): Other (See Comments)  blister    Ciprofloxacin      Elevated liver enzymes    Hydralazine analogues     Metformin Nausea And Vomiting    Tetracycline Itching    Tetracyclines Swelling    Travatan (with benzalkonium) [travoprost (benzalkonium)]     Travoprost Itching     Other reaction(s): Other (See Comments)  Blurry vision       Medications:  Medications Prior to Admission   Medication Sig    acetaminophen (TYLENOL)  500 MG tablet Take 1,000 mg by mouth 2 (two) times daily as needed for Pain.    albuterol (PROVENTIL/VENTOLIN HFA) 90 mcg/actuation inhaler Inhale 2 puffs into the lungs every 6 (six) hours as needed for Wheezing or Shortness of Breath. Rescue    albuterol-ipratropium (DUO-NEB) 2.5 mg-0.5 mg/3 mL nebulizer solution Take 3 mLs by nebulization every 4 (four) hours as needed for Wheezing or Shortness of Breath. Rescue     aspirin (ECOTRIN) 81 MG EC tablet Take 1 tablet (81 mg total) by mouth once daily.    clopidogrel (PLAVIX) 75 mg tablet Take 75 mg by mouth once daily.    diclofenac sodium (VOLTAREN) 1 % Gel Apply 2 g topically daily as needed (PAIN).    DULoxetine (CYMBALTA) 60 MG capsule Take 60 mg by mouth once daily.    fluticasone-vilanterol (BREO ELLIPTA) 100-25 mcg/dose diskus inhaler Inhale 1 puff into the lungs once daily. Controller    gabapentin (NEURONTIN) 300 MG capsule Take 300 mg by mouth once daily.    Lactobacillus rhamnosus-fiber 2.5 billion cell-3.5 gram PwPk Take 1 capsule by mouth.    lansoprazole (PREVACID) 30 MG capsule Take 30 mg by mouth once daily.    losartan (COZAAR) 100 MG tablet Take 100 mg by mouth once daily.     ondansetron (ZOFRAN) 4 MG tablet Take 1 tablet (4 mg total) by mouth every 6 (six) hours as needed.    predniSONE (DELTASONE) 10 MG tablet Take 4 tablets (40 mg) on 12/16, then take 1 tablet (10 mg) daily    pyridoxine, vitamin B6, (VITAMIN B-6) 50 MG Tab Take 1 tablet (50 mg total) by mouth once daily.    simvastatin (ZOCOR) 40 MG tablet Take 40 mg by mouth.    theophylline (THEODUR) 300 mg 24 hr capsule Take 300 mg by mouth once daily.    traMADol (ULTRAM) 50 mg tablet TK 1 T PO BID PRN    [DISCONTINUED] baclofen (LIORESAL) 10 MG tablet Take 10 mg by mouth 2 (two) times daily as needed (MUSCLE SPASMS).    [DISCONTINUED] baclofen (LIORESAL) 10 MG tablet Take 10 mg by mouth.    [DISCONTINUED] cefdinir (OMNICEF) 300 MG capsule TK ONE C PO  BID FOR 7 DAYS     [DISCONTINUED] cephALEXin (KEFLEX) 750 MG capsule TK ONE C PO TID FOR 5 DAYS    [DISCONTINUED] ciprofloxacin HCl (CIPRO) 250 MG tablet Take 1 tablet (250 mg total) by mouth every 12 (twelve) hours. Starting 12/15 evening    [DISCONTINUED] diltiaZEM (CARDIZEM CD) 180 MG 24 hr capsule Take 180 mg by mouth once daily.    [DISCONTINUED] furosemide (LASIX) 40 MG tablet Take 40 mg by mouth once daily.     [DISCONTINUED] HYDROcodone-acetaminophen (NORCO) 5-325 mg per tablet Take 1 tablet by mouth every 4 to 6 hours as needed.    [DISCONTINUED] ondansetron (ZOFRAN) 4 MG tablet Take 4-8 mg by mouth.    [DISCONTINUED] potassium chloride SA (K-DUR,KLOR-CON) 10 MEQ tablet Take 10 mEq by mouth.    [DISCONTINUED] prazosin (MINIPRESS) 5 MG capsule TK 1 C PO BID     Antibiotics (From admission, onward)    Start     Stop Route Frequency Ordered    03/14/19 1200  meropenem (MERREM) 2 g in sodium chloride 0.9% 100 mL IVPB      -- IV Every 8 hours (non-standard times) 03/14/19 1057        Antifungals (From admission, onward)    None        Antivirals (From admission, onward)    None           Immunization History   Administered Date(s) Administered    PPD Test 08/28/2018, 09/14/2018       Family History     Problem Relation (Age of Onset)    Cancer Father    Diabetes Brother    Lupus Sister    Multiple sclerosis Mother        Social History     Socioeconomic History    Marital status:      Spouse name: None    Number of children: None    Years of education: None    Highest education level: None   Social Needs    Financial resource strain: None    Food insecurity - worry: None    Food insecurity - inability: None    Transportation needs - medical: None    Transportation needs - non-medical: None   Occupational History    None   Tobacco Use    Smoking status: Never Smoker    Smokeless tobacco: Never Used   Substance and Sexual Activity    Alcohol use: No     Frequency: Never    Drug use: No    Sexual  activity: No     Partners: Male   Other Topics Concern    None   Social History Narrative    None     Review of Systems   Constitutional: Negative for chills and fever.   Respiratory: Positive for cough and shortness of breath.    Cardiovascular: Negative for chest pain.   Gastrointestinal: Negative for abdominal pain, nausea and vomiting.   Skin: Negative for rash.   Neurological: Positive for weakness. Negative for numbness.     Objective:     Vital Signs (Most Recent):  Temp: 98.2 °F (36.8 °C) (03/14/19 1137)  Pulse: 81 (03/14/19 1202)  Resp: 18 (03/14/19 1202)  BP: 133/76 (03/14/19 1137)  SpO2: 99 % (03/14/19 1202) Vital Signs (24h Range):  Temp:  [97.7 °F (36.5 °C)-98.2 °F (36.8 °C)] 98.2 °F (36.8 °C)  Pulse:  [70-85] 81  Resp:  [16-18] 18  SpO2:  [95 %-100 %] 99 %  BP: (133-181)/() 133/76     Weight: 61.7 kg (136 lb 0.4 oz)  Body mass index is 24.88 kg/m².    Estimated Creatinine Clearance: 99.4 mL/min (based on SCr of 0.5 mg/dL).    Physical Exam   Constitutional: She is oriented to person, place, and time. She appears well-developed and well-nourished. No distress.   Cardiovascular: Normal rate and regular rhythm.   Murmur heard.  Pulmonary/Chest: Effort normal. No respiratory distress. She has decreased breath sounds.   Abdominal: Soft. Bowel sounds are normal. She exhibits no distension.   Musculoskeletal: Normal range of motion. She exhibits no edema.   Neurological: She is alert and oriented to person, place, and time.   Skin: Skin is warm and dry.   Psychiatric: She has a normal mood and affect. Her behavior is normal.       Significant Labs:   Blood Culture:   Recent Labs   Lab 01/24/19  0839 01/25/19  1752 01/26/19  0109 02/19/19  1531 03/08/19  1323   LABBLOO Gram stain aer bottle: Gram positive cocci in clusters resembling Staph   Results called to and read back by: Micheline Montgomery RN 01/25/2019    02:39  METHICILLIN RESISTANT STAPHYLOCOCCUS AUREUS  ID consult required at [facility] and [facility].  For susceptibility see order # [id number]   No growth after 5 days. No growth after 5 days. No growth after 5 days.  No growth after 5 days. No growth after 5 days.  No growth after 5 days.     CBC:   Recent Labs   Lab 03/13/19 0522 03/14/19  0354   WBC 10.83 10.56   HGB 9.0* 9.5*   HCT 29.8* 31.3*    236     CMP:   Recent Labs   Lab 03/13/19 0522 03/14/19  0354   * 133*   K 3.4* 3.2*   CL 94* 95   CO2 29 29   GLU 67* 70   BUN 17 13   CREATININE 0.5 0.5   CALCIUM 9.2 9.2   PROT 6.0 5.8*   ALBUMIN 2.2* 2.2*   BILITOT 2.8* 2.7*   ALKPHOS 678* 659*   * 100*   * 100*   ANIONGAP 9 9   EGFRNONAA >60.0 >60.0     Respiratory Culture:   Recent Labs   Lab 03/10/19  0101   GSRESP <10 epithelial cells per low power field.  Many WBC's  No organisms seen   RESPIRATORYC No Pseudomonas isolated.  METHICILLIN RESISTANT STAPHYLOCOCCUS AUREUS  Moderate    ACINETOBACTER BAUMANNII COMPLEX  Moderate  Susceptibility pending         Significant Imaging: I have reviewed all pertinent imaging results/findings within the past 24 hours.

## 2019-03-14 NOTE — PROGRESS NOTES
[facility] Hospital Medicine                                                                     Progress Note     Team: [facility]OSP MED A Rosa Juan MD   Admit Date: 1/23/2019   Hospital Day: 49  TAMIKO: 3/14/2019   Code status: Full Code   Principal Problem: Septic shock     SUMMARY:     Patient is a 63 y.o. with chronic diastolic heart failure, COPD on home oxygen at 2 liters, chronic debility, CAD, Type 2 diabetes, HTN, previous CVA with residual right sided weakness, prior appendectomy in 8/2018 complicated by C. Diff infection, failure to thrive admitted to the hospital on 1/23/2019 with nausea vomiting and abdominal pain found to have fluid collection in R hemipelvis. Started on vanc/zosyn. Surgery consulted, s/p ex lap, washout and pelvic abscess drainage 1/25. Post-op course complicated by septic shock secondary to MRSA bacteremia for which she received 2 weeks of IV vancomycin and briefly required pressors.  Wound culture also grew Bacteroides and patient completed 4 weeks of Cipro and Flagyl.  Patient remained intubated postoperatively and was successfully extubated on 01/28.  Initially, she was stabilized and transferred to the floor.  She failed speech language therapy and was started on TPN with subsequent placement of IR guided G-tube in transition to tube feeds on 02/10.  On the floor, patient was making some progress and being followed by [facility]; however, a rapid response was called on 02/19 due to somnolence.  ABG was consistent with acute hypercapnic respiratory failure, and she was placed on BiPAP and transferred back to SICU.  Infectious workup was significant for a UA with 39 WBCs, moderate bacteria, and moderate yeast; urine culture ultimately grew Candida for which she was started on Diflucan IV.  She was also found to have markedly  elevated alk-phos, AST, and ALT.  Hepatology was consulted and felt acute liver injury related to infection versus medication, and not underlying liver disease. And ammonia was checked and was elevated, but hepatology recommended against treatment as they felt patient did not have hepatic encephalopathy.  Right upper quadrant ultrasound showed biliary sludge and gallbladder thickening but was negative for acute cholecystitis.  A subsequent HIDA scan was not consistent with acute cholecystitis; no surgical intervention needed per General surgery evaluation.  Patient's mental status improved in the ICU and is now on nasal cannula oxygen and BiPAP at night.  Medicine initially consulted for assistance with management and noted patient to be markedly fluid overloaded, estimated to be approximately net positive 16 L with this admission.  She had elevated CVP and TTE was consistent volume overload as well as her BNP of > 4900.  She was started on IV diuresis with Lasix with improvement in urine output.  She is to be stepped down to Hospital Medicine for further management and ultimately skilled nursing facility placement.    3/9 - Transfer back to MICU for SEptic Shock requiring vasopressor support - etiology is RLL Pneumonia noted on CT Abdomen/pelvis, no new intraabdominal source of infection. She improved and was weaned back down to home  2L nasal cannula. Respiratory cultures grew Resistant Acinetobacter and MRSA so ID was consulted for antibiotic recommendations. Initially patient and family wanted to go to [facility] but now interested in [facility].    SUBJECTIVE:   Pt seen and examined at bedside. She reports uneventful night. She is eating a little bit more. Tube feeding rate lowered.  Discussed with her daughter at bedside, updated on POC.        Review of Systems   Constitutional: Negative for chills and fever.   Respiratory: Positive for cough. Negative for wheezing.    Cardiovascular: Negative for chest pain.    Gastrointestinal: Negative for abdominal pain and vomiting.   Neurological: Positive for weakness.       OBJECTIVE:     Vitals:  Temp:  [97.7 °F (36.5 °C)-98.2 °F (36.8 °C)]   Pulse:  [70-85]   Resp:  [16-18]   BP: (133-181)/()   SpO2:  [95 %-100 %]      I & O (Last 24H):     Intake/Output Summary (Last 24 hours) at 3/14/2019 1308  Last data filed at 3/14/2019 0900  Gross per 24 hour   Intake 500 ml   Output 625 ml   Net -125 ml       Physical Exam   Constitutional: She is oriented to person, place, and time. No distress.   Neck: No JVD present.   Cardiovascular: Normal rate and regular rhythm.   Murmur heard.  Pulmonary/Chest: Effort normal. No respiratory distress. She has no wheezes.   Decreased Breath sounds  2L nasal cannula   Abdominal: Soft. Bowel sounds are normal. There is no tenderness.   PEG in place. Surrounding skin clean and dry   Lymphadenopathy:     She has no cervical adenopathy.   Neurological: She is alert and oriented to person, place, and time.   Skin: Skin is warm and dry.   Psychiatric: She has a normal mood and affect. Her behavior is normal. Thought content normal.   Nursing note and vitals reviewed.      All recent labs and imaging has been reviewed.     Recent Results (from the past 24 hour(s))   POCT glucose    Collection Time: 03/13/19  9:11 PM   Result Value Ref Range    POCT Glucose 86 70 - 110 mg/dL   POCT glucose    Collection Time: 03/14/19  1:24 AM   Result Value Ref Range    POCT Glucose 86 70 - 110 mg/dL   Prealbumin    Collection Time: 03/14/19  3:54 AM   Result Value Ref Range    Prealbumin 21 20 - 43 mg/dL   Comprehensive Metabolic Panel (CMP)    Collection Time: 03/14/19  3:54 AM   Result Value Ref Range    Sodium 133 (L) 136 - 145 mmol/L    Potassium 3.2 (L) 3.5 - 5.1 mmol/L    Chloride 95 95 - 110 mmol/L    CO2 29 23 - 29 mmol/L    Glucose 70 70 - 110 mg/dL    BUN, Bld 13 8 - 23 mg/dL    Creatinine 0.5 0.5 - 1.4 mg/dL    Calcium 9.2 8.7 - 10.5 mg/dL    Total Protein  5.8 (L) 6.0 - 8.4 g/dL    Albumin 2.2 (L) 3.5 - 5.2 g/dL    Total Bilirubin 2.7 (H) 0.1 - 1.0 mg/dL    Alkaline Phosphatase 659 (H) 55 - 135 U/L     (H) 10 - 40 U/L     (H) 10 - 44 U/L    Anion Gap 9 8 - 16 mmol/L    eGFR if African American >60.0 >60 mL/min/1.73 m^2    eGFR if non African American >60.0 >60 mL/min/1.73 m^2   CBC auto differential    Collection Time: 03/14/19  3:54 AM   Result Value Ref Range    WBC 10.56 3.90 - 12.70 K/uL    RBC 3.11 (L) 4.00 - 5.40 M/uL    Hemoglobin 9.5 (L) 12.0 - 16.0 g/dL    Hematocrit 31.3 (L) 37.0 - 48.5 %     (H) 82 - 98 fL    MCH 30.5 27.0 - 31.0 pg    MCHC 30.4 (L) 32.0 - 36.0 g/dL    RDW 17.0 (H) 11.5 - 14.5 %    Platelets 236 150 - 350 K/uL    MPV 11.6 9.2 - 12.9 fL    Immature Granulocytes 0.8 (H) 0.0 - 0.5 %    Gran # (ANC) 6.4 1.8 - 7.7 K/uL    Immature Grans (Abs) 0.08 (H) 0.00 - 0.04 K/uL    Lymph # 2.7 1.0 - 4.8 K/uL    Mono # 0.9 0.3 - 1.0 K/uL    Eos # 0.5 0.0 - 0.5 K/uL    Baso # 0.02 0.00 - 0.20 K/uL    nRBC 0 0 /100 WBC    Gran% 60.4 38.0 - 73.0 %    Lymph% 25.5 18.0 - 48.0 %    Mono% 8.2 4.0 - 15.0 %    Eosinophil% 4.9 0.0 - 8.0 %    Basophil% 0.2 0.0 - 1.9 %    Differential Method Automated    Magnesium    Collection Time: 03/14/19  3:54 AM   Result Value Ref Range    Magnesium 1.5 (L) 1.6 - 2.6 mg/dL   Phosphorus    Collection Time: 03/14/19  3:54 AM   Result Value Ref Range    Phosphorus 2.8 2.7 - 4.5 mg/dL   VANCOMYCIN, TROUGH before 4th dose    Collection Time: 03/14/19  3:54 AM   Result Value Ref Range    Vancomycin-Trough 23.4 (H) 10.0 - 22.0 ug/mL   POCT glucose    Collection Time: 03/14/19  5:52 AM   Result Value Ref Range    POCT Glucose 70 70 - 110 mg/dL   POCT glucose    Collection Time: 03/14/19  7:54 AM   Result Value Ref Range    POCT Glucose 82 70 - 110 mg/dL       Recent Labs   Lab 03/13/19  0419 03/13/19  1128 03/13/19  2111 03/14/19  0124 03/14/19  0552 03/14/19  0754   POCTGLUCOSE 83 68* 86 86 70 82        Active  Hospital Problems    Diagnosis  POA    Pneumonia [J18.9]  No    Wide nasal bridge [Q30.8]  Not Applicable    Right lower lobe pneumonia [J18.1]  No    Proctitis [K62.89]  No    Adrenal insufficiency [E27.40]  No    Chronic respiratory failure with hypoxia and hypercapnia [J96.11, J96.12]  Yes    Other specified anemias [D64.89]  Yes    Generalized abdominal pain [R10.84]  Yes    Abnormal thyroid function test [R94.6]  Yes    Urinary incontinence without sensory awareness [N39.42]  Yes    Elevated liver enzymes [R74.8]  No    On enteral nutrition [Z78.9]  No    Alteration in skin integrity due to moisture [R23.9]  Yes    Dysphagia [R13.10]  No    Multiple skin tears [T14.8XXA]  Yes    Alteration in skin integrity related to surgical incision [R23.9]  Yes    Depression [F32.9]  Yes    Debility [R53.81]  Yes    Severe malnutrition [E43]  Yes    Moderate asthma without complication [J45.909]  Yes    Type 2 diabetes mellitus without complication, without long-term current use of insulin [E11.9]  Yes    CVA, old, hemiparesis [I69.359]  Not Applicable    Coronary artery disease involving native coronary artery of native heart without angina pectoris [I25.10]  Yes      Resolved Hospital Problems    Diagnosis Date Resolved POA    *Septic shock [A41.9, R65.21] 03/13/2019 No    Hypomagnesemia [E83.42] 03/13/2019 Yes    Hypophosphatemia [E83.39] 03/13/2019 Yes    Hypokalemia [E87.6] 03/13/2019 Yes    Bradycardia [R00.1] 03/10/2019 No    Acute respiratory failure with hypoxia and hypercarbia [J96.01, J96.02] 03/10/2019 No    Acute cystitis [N30.00] 02/26/2019 Yes    Idioventricular rhythm [I44.2] 03/10/2019 Yes    Hypophosphatemia [E83.39] 02/21/2019 No    Pelvic abscess in female [N73.9] 03/13/2019 Yes    Severe sepsis [A41.9, R65.20] 02/02/2019 No    Acute renal failure superimposed on stage 3 chronic kidney disease [N17.9, N18.3] 02/24/2019 Yes    Hypomagnesemia [E83.42] 01/25/2019 Yes     Acute metabolic encephalopathy [G93.41] 03/10/2019 Yes    Essential hypertension [I10] 02/20/2019 Yes    Acute on chronic diastolic (congestive) heart failure [I50.33] 03/10/2019 Yes          ASSESSMENT AND PLAN:     Septic shock  Right Lower Lobe Pneumonia   -Blood Cultures show No growth  -s/p 1day MICU stay, repeat ICU txfr.   -Stress Dose steroids weaned for patient to resume chronic prednisone by 3/12  -Sputum Culture with resistant Acinetobacter and MRSA  -Continue empiric antibiotics. ID consulted for further antibiotic recommendations     Pelvic Abscesses/Generalized abdominal pain  Due to recent Pelvic abscess in female  CT abdomen showed a 4 x 3 cm enhancing fluid collection consistent with abscess.  She also had elevated lactate of 6.8.  She was admitted to the STICU service (Dr. Laurent) for initial management. Patient taken to OR on 01/25 for ex lap with drainage of abscess.  Postoperative course complicated by septic shock secondary to MRSA bacteremia for which she received 2 weeks of IV vancomycin and briefly required pressors, which have now been weaned off.  Wound culture also grew Bacteroides and patient completed 4 weeks of Cipro and Flagyl.  Patient remained intubated postoperatively and was successfully extubated on 01/28.  Initially, she was stabilized and transferred to the floor.     On the floor, patient was making some progress and being followed by [facility]; however, a rapid response was called on 02/19 due to somnolence.  ABG was consistent with acute hypercapnic respiratory  failure, and she was placed on BiPAP and transferred back to SICU.  Infectious workup was significant for a UA with 39 WBCs, moderate bacteria, and moderate yeast; urine culture ultimately grew Candida for which she was started on Diflucan IV.    3/3 surgery looked at wounds and deferred to wound care    Elevated liver enzymes - improving   She was also found to have markedly elevated alk-phos, AST, and ALT.   Hepatology was consulted and felt acute liver injury related to infection versus medication, and not underlying liver disease. And ammonia was checked and was elevated, but hepatology recommended against treatment as they felt patient did not have hepatic encephalopathy.  Right upper quadrant ultrasound showed biliary sludge and gallbladder thickening but was negative for acute cholecystitis.  A subsequent HIDA scan was not consistent with acute cholecystitis; no surgical intervention needed per General surgery evaluation.    - re-consulted hepatology 2/27 who thought this could be drug induced.  DO not use cipro.  Recommend a trial of ursodiol 600mg BID to help with cholestasis with peak Bili of 12.  If not better in two weeks may need biopsy.   - diarrhea may be bile induced, monitor for improvement and consider cholestyramine if not better      Moderate asthma without complication/ Chronic Hypoxic & Hypercapnic respiratory failure  -Requires Nightly BIPAP   >Prone to hypercapnic narcosis  -Continue q4WAKE - Duo Nebs.    . Steroid dependant asthma - chronically on Prednisone 10mg.   - wean o2  - bipap qhs and prn for AMS   - c/w daily breo, theophylline,     Debility  Mostly wheelchair bound, but able to work with [facility]  using walker  - Family requesting to go [facility] with [facility]    Dysphagia/Severe Malnutrition   She failed speech language therapy and was started on TPN with subsequent placement of IR guided G-tube in transition to tube feeds on 02/10. Surgery replaced tube 3/2 when pt pulled it out.  - c/w tube feeds  - SLP saw pt 3/12. Advance to mechanical soft with thin liquids     Coronary artery disease involving native coronary artery of native heart without angina pectoris  - c/w ASA, plavix, statin  - no anginal equivalent    Type 2 diabetes mellitus without complication, without long-term current use of insulin  -- acceptable; continue with current treatment & monitor         Resolved Issues   Acute on  chronic diastolic (congestive) heart failure - resolved  Patient's mental status improved in the ICU and is now on nasal cannula oxygen and BiPAP at night.  Medicine initially consulted 2/22 for assistance with management and noted patient to be markedly fluid overloaded, estimated to be approximately net positive 16 L with this admission.  Weight had increased from 71-75kg to 87kg.   She had elevated CVP and TTE was consistent volume overload as well as her BNP of > 4900.     2/22 IV diuresis with Lasix 40 bid  with improvement in urine output.  3/2 weight down to 73kg which is near her dry weight per epic but BNP was 2600;   -3/5 switched to PO lasix as her weight is now 66kg and exam is improved.  DTR states that patient at home is prone to dehydration with lasix so it is not used daily.  - Lasix PO every other day for now    Hypomagnesemia - resolved  Acute respiratory failure with hypoxia and hypercarbia - resolved  Bradycardia - resolved    Prophylaxis- Hep TID    Code Status- Full Code     Discharge plan and follow up - [facility] pending culture results          Rosa Juan MD  Attending Physician  [facility] Medicine Dept.

## 2019-03-14 NOTE — PT/OT/SLP PROGRESS
Occupational Therapy   Treatment    Name: Sheryl Martines  MRN: [id number]  Admitting Diagnosis:  Septic shock  48 Days Post-Op    Recommendations:     Discharge Recommendations: nursing facility, skilled  Discharge Equipment Recommendations:  hospital bed  Barriers to discharge:  Decreased caregiver support    Assessment:     Sheryl Martines is a 63 y.o. female with a medical diagnosis of Septic shock.  She presents with impaired ADLs and functional mobility performace. Pt self-limiting with decreased motivation, fear of falling, as well as decreased LE effort when attempting sit <>stand during OT session. Pt participated in bed mobility, facial cleansing, UE ROM and gentle stretching with neck, and attempted sit<>stand transfer. Pt was able to tolerate EOB ~20 min with SBA-CGA with dynamic sitting task. Pt educated on importance of continued mobility to which pt verbalized understanding. Performance deficits affecting function are weakness, impaired self care skills, impaired balance, impaired functional mobilty, impaired endurance, gait instability, decreased lower extremity function, decreased upper extremity function, decreased coordination, pain. Pt would benefit from continued OT services 3x/wk to increase daily living skills to optimize QOL. Recommended d/c is SNF at this time.    Rehab Prognosis:  Fair; patient would benefit from acute skilled OT services to address these deficits and reach maximum level of function.       Plan:     Patient to be seen 3 x/week to address the above listed problems via self-care/home management, therapeutic activities, therapeutic exercises  · Plan of Care Expires: 03/28/19  · Plan of Care Reviewed with: patient    Subjective     Pain/Comfort:  · Pain Rating 1: other (see comments)(Pain in abdomen near PEG site and B feet no numerical rating given)  · Pain Addressed 1: Reposition, Distraction, Cessation of Activity    Objective:     Communicated with: RN prior to session.   Patient found left sidelying with oxygen, peripheral IV, PEG Tube, PureWick, telemetry upon OT entry to room. HOB elevated.    General Precautions: Standard, aspiration, fall, respiratory   Orthopedic Precautions:N/A   Braces: N/A     Occupational Performance:     Bed Mobility:    · Patient completed Rolling/Turning to Right with stand by assistance and contact guard assistance  · Patient completed Scooting/Bridging with stand by assistance and contact guard assistance  · Patient completed Supine to Sit with minimum assistance  · Patient completed Sit to Supine with minimum assistance     Functional Mobility/Transfers:  · Patient completed Sit <> Stand Transfer with total assistance  with  hand-held assist   · Functional Mobility: Pt attempted sit <>stand transfer x1 trial while wearing hinge knee brace on R LE however pt self limiting with motivation to fully stand. Pt also limited with BLE weakness.    Activities of Daily Living:  · Grooming: setup of facial grooming items with pt sitting EOB  demo good sitting balance.  [facility] 6 Click ADL: 10    Treatment & Education:  Role of OT, POC, and safety during ADLs and functional mobility education with pt. Pt verbalized understanding. Pt completed the following gentle stretches and UE exercises with pt sitting EOB with good balance:  -Stretch, held 10 seconds each: cervical flexion/extension, looking right/left direction, gentle neck rolling counterclockwise, clockwise direction, shoulder shrugs up/down, forward/backward.  -AROM, 1X5 each: shoulder flexion/ext, elbow flexion/ext, forearm flex/ext, wrist flex/ext, finger flex/ext.    Patient left right sidelying , HOB elevated with all lines intact, call button in reach and nurse  notifiedEducation:      GOALS:   Multidisciplinary Problems     Occupational Therapy Goals        Problem: Occupational Therapy Goal    Goal Priority Disciplines Outcome Interventions   Occupational Therapy Goal     OT, PT/OT Ongoing  (interventions implemented as appropriate)    Description:  Goals to be met by: 3/12/19    Patient will increase functional independence with ADLs by performing:    Feeding with Set-up Assistance.  UE Dressing with Set-up Assistance.  LE Dressing with Moderate Assistance with AD as needed.  Grooming while seated with modified independence.  Toileting from bedside commode with Maximum Assistance for hygiene and clothing management.   Toilet transfer to bedside commode with Maximum Assistance.  Sitting EOB ~10 min with modified independence.                          Time Tracking:     OT Date of Treatment: 03/14/19  OT Start Time: 1408  OT Stop Time: 1449  OT Total Time (min): 41 min    Billable Minutes:Therapeutic Activity 20 min  Therapeutic Exercise 21 min    Annamarie Wang OT  3/14/2019

## 2019-03-14 NOTE — CONSULTS
[facility]  Infectious Disease  Consult Note    Patient Name: Sheryl Martines  MRN: [id number]  Admission Date: 1/23/2019  Hospital Length of Stay: 49 days  Attending Physician: Rosa Juan MD  Primary Care Provider: Primary Doctor No     Isolation Status: No active isolations    Patient information was obtained from patient, relative(s) and past medical records.      Inpatient consult to Infectious Diseases  Consult performed by: KOTA Corea  Consult ordered by: Rosa Juan MD        Assessment/Plan:     Pneumonia    63 year old female admitted since 1/23; found to have fluid collection right hemipelvis after appendectomy c/b MRSA bacteremia s/p ex-lap, pelvic abscess drainage 1/25- cx's with bacteroides and gram stain with GNR; completed 2 weeks of IV vanc and 5 weeks of cipro/flagyl; ID consulted earlier in march for increasing leukocytosis and further antibiotics; recommend monitoring pt off of abx at that time; on 3/8, pt had elevation of WBC to 27K, fever, and abd pain; CT abd/pelvis 3/8 showed possible necrotizing pneumonia; ID re-consulted now given sputum culture with MRSA and acinetobacter.     Sputum cultures 3/10 with MRSA and acinetobacter (R-zosyn). On day 7 of vanc/zosyn.  Leukocytosis resolved. Afebrile.  Pt on nasal cannula oxygen.     Plan:  1. Recommend continuing vancomycin for now; may switch to oral linezolid to complete course  2. D/c zosyn and start meropenem empirically for acinetobacter (will f/u on susceptibilities)  3. Anticipate 14 days of total therapy  4. Will follow up tomorrow        Thank you for your consult. I will follow-up with patient. Please contact us if you have any additional questions.  KOTA Bernal Pager: [phone]  Infectious Disease  [facility]    Subjective:     Principal Problem: Septic shock    HPI: Pt is a 64 y/o female with hx of asthma, COPD, CAD, DM, HTN, CVA 2012 with R side deficits, HFpEF, severe  debility , lap converted to open appendectomy 8/2018 complicated by cdiff, failure to thrive and recurrent fluid collection presented to the ED with intractable n/v and abdominal pain.     Appendectomy complcated by sx site infxn with recurrent fluid collection.  Cxs- MRSA, Enterococcus, Ecoli and Anaerobes.   Abx treatment Vanc 9/11-10/1;  Pip-tazo 9/11-9/13, 9/23-10/1; Ertapenem 9/14-9/22.  No change in fluid collection.  Abx discontinued on 10/1. Lactic acid 6.8, with tachycardia and WBC of 13.  1/24 CT shows fluid collection in R hemipelvis.  Started on vanc and zosyn. Patient now s/p exploratory laparotomy, washout, and pelvic abscess drainage on 1/25/19. Gross purulence.  Blood cx positive for MRSA on 1/24.     Pt was treated with 2 weeks of IV vanc for MRSA bacteremia and then about 5 weeks of cipro/flagyl. On 3/8, pt with abdominal pain, fever, and elevated WBC count. Infectious work up initiated. CT revealed possible necrotizing pneumonia. Pt started on empiric vanc and zosyn (on day 7). Sputum cultures growing MRSA and acinetobacter. ID consulted for abx recs.     Patient's daughter at bedside. Pt sitting up in chair. Overall, feeling well.       Past Medical History:   Diagnosis Date    Abnormal LFTs 12/14/2018    Closed compression fracture of fourth lumbar vertebra     Closed fracture of right tibia and fibula 10/3/2018    COPD (chronic obstructive pulmonary disease)     home O2    Coronary artery disease     Diabetes mellitus     Fall 10/4/2018    Glaucoma     High cholesterol     Hypertension     Infected incision 9/20/2018    Iritis     Pulmonary embolus     S/P appendectomy 9/11/2018    Stroke     rt sided weakness.       Past Surgical History:   Procedure Laterality Date    ABDOMINAL SURGERY      APPENDECTOMY N/A 8/26/2018    Performed by eBrnadine [facility]endrez [facility] at [facility] OR    APPENDECTOMY, LAPAROSCOPIC---CONVERTED TO OPEN APPENDECTOMY @0950 N/A 8/26/2018    Performed by Bernadine  WILLIAM [facility] at [facility] OR    APPLICATION, WOUND VAC N/A 1/25/2019    Performed by Monster Laurent MD at [facility] OR 2ND FLR    BACK SURGERY      stimulator    CATARACT EXTRACTION      EXCISION, ABSCESS- drainage abdominal wall abscess N/A 1/25/2019    Performed by [facility] at [facility] OR 2ND FLR    HYSTERECTOMY      INCISION AND DRAINAGE, ABSCESS-  drainage of pelvic abscess N/A 1/25/2019    Performed by Monster Laurent [facility] at [facility] OR 2ND FLR    LAPAROTOMY, EXPLORATORY N/A 1/25/2019    Performed by Monster Laurent MD at [facility] OR 2ND FLR    TOTAL HIP ARTHROPLASTY Left     2008       Review of patient's allergies indicates:   Allergen Reactions    Ace inhibitors Swelling    Carvedilol      Other reaction(s): Other (See Comments)  blister    Ciprofloxacin      Elevated liver enzymes    Hydralazine analogues     Metformin Nausea And Vomiting    Tetracycline Itching    Tetracyclines Swelling    Travatan (with benzalkonium) [travoprost (benzalkonium)]     Travoprost Itching     Other reaction(s): Other (See Comments)  Blurry vision       Medications:  Medications Prior to Admission   Medication Sig    acetaminophen (TYLENOL) 500 MG tablet Take 1,000 mg by mouth 2 (two) times daily as needed for Pain.    albuterol (PROVENTIL/VENTOLIN HFA) 90 mcg/actuation inhaler Inhale 2 puffs into the lungs every 6 (six) hours as needed for Wheezing or Shortness of Breath. Rescue    albuterol-ipratropium (DUO-NEB) 2.5 mg-0.5 mg/3 mL nebulizer solution Take 3 mLs by nebulization every 4 (four) hours as needed for Wheezing or Shortness of Breath. Rescue     aspirin (ECOTRIN) 81 MG EC tablet Take 1 tablet (81 mg total) by mouth once daily.    clopidogrel (PLAVIX) 75 mg tablet Take 75 mg by mouth once daily.    diclofenac sodium (VOLTAREN) 1 % Gel Apply 2 g topically daily as needed (PAIN).    DULoxetine (CYMBALTA) 60 MG capsule Take 60 mg by mouth once daily.    fluticasone-vilanterol (BREO  ELLIPTA) 100-25 mcg/dose diskus inhaler Inhale 1 puff into the lungs once daily. Controller    gabapentin (NEURONTIN) 300 MG capsule Take 300 mg by mouth once daily.    Lactobacillus rhamnosus-fiber 2.5 billion cell-3.5 gram PwPk Take 1 capsule by mouth.    lansoprazole (PREVACID) 30 MG capsule Take 30 mg by mouth once daily.    losartan (COZAAR) 100 MG tablet Take 100 mg by mouth once daily.     ondansetron (ZOFRAN) 4 MG tablet Take 1 tablet (4 mg total) by mouth every 6 (six) hours as needed.    predniSONE (DELTASONE) 10 MG tablet Take 4 tablets (40 mg) on 12/16, then take 1 tablet (10 mg) daily    pyridoxine, vitamin B6, (VITAMIN B-6) 50 MG Tab Take 1 tablet (50 mg total) by mouth once daily.    simvastatin (ZOCOR) 40 MG tablet Take 40 mg by mouth.    theophylline (THEODUR) 300 mg 24 hr capsule Take 300 mg by mouth once daily.    traMADol (ULTRAM) 50 mg tablet TK 1 T PO BID PRN    [DISCONTINUED] baclofen (LIORESAL) 10 MG tablet Take 10 mg by mouth 2 (two) times daily as needed (MUSCLE SPASMS).    [DISCONTINUED] baclofen (LIORESAL) 10 MG tablet Take 10 mg by mouth.    [DISCONTINUED] cefdinir (OMNICEF) 300 MG capsule TK ONE C PO  BID FOR 7 DAYS    [DISCONTINUED] cephALEXin (KEFLEX) 750 MG capsule TK ONE C PO TID FOR 5 DAYS    [DISCONTINUED] ciprofloxacin HCl (CIPRO) 250 MG tablet Take 1 tablet (250 mg total) by mouth every 12 (twelve) hours. Starting 12/15 evening    [DISCONTINUED] diltiaZEM (CARDIZEM CD) 180 MG 24 hr capsule Take 180 mg by mouth once daily.    [DISCONTINUED] furosemide (LASIX) 40 MG tablet Take 40 mg by mouth once daily.     [DISCONTINUED] HYDROcodone-acetaminophen (NORCO) 5-325 mg per tablet Take 1 tablet by mouth every 4 to 6 hours as needed.    [DISCONTINUED] ondansetron (ZOFRAN) 4 MG tablet Take 4-8 mg by mouth.    [DISCONTINUED] potassium chloride SA (K-DUR,KLOR-CON) 10 MEQ tablet Take 10 mEq by mouth.    [DISCONTINUED] prazosin (MINIPRESS) 5 MG capsule TK 1 C PO BID      Antibiotics (From admission, onward)    Start     Stop Route Frequency Ordered    03/14/19 1200  meropenem (MERREM) 2 g in sodium chloride 0.9% 100 mL IVPB      -- IV Every 8 hours (non-standard times) 03/14/19 1057        Antifungals (From admission, onward)    None        Antivirals (From admission, onward)    None           Immunization History   Administered Date(s) Administered    PPD Test 08/28/2018, 09/14/2018       Family History     Problem Relation (Age of Onset)    Cancer Father    Diabetes Brother    Lupus Sister    Multiple sclerosis Mother        Social History     Socioeconomic History    Marital status:      Spouse name: None    Number of children: None    Years of education: None    Highest education level: None   Social Needs    Financial resource strain: None    Food insecurity - worry: None    Food insecurity - inability: None    Transportation needs - medical: None    Transportation needs - non-medical: None   Occupational History    None   Tobacco Use    Smoking status: Never Smoker    Smokeless tobacco: Never Used   Substance and Sexual Activity    Alcohol use: No     Frequency: Never    Drug use: No    Sexual activity: No     Partners: Male   Other Topics Concern    None   Social History Narrative    None     Review of Systems   Constitutional: Negative for chills and fever.   Respiratory: Positive for cough and shortness of breath.    Cardiovascular: Negative for chest pain.   Gastrointestinal: Negative for abdominal pain, nausea and vomiting.   Skin: Negative for rash.   Neurological: Positive for weakness. Negative for numbness.     Objective:     Vital Signs (Most Recent):  Temp: 98.2 °F (36.8 °C) (03/14/19 1137)  Pulse: 81 (03/14/19 1202)  Resp: 18 (03/14/19 1202)  BP: 133/76 (03/14/19 1137)  SpO2: 99 % (03/14/19 1202) Vital Signs (24h Range):  Temp:  [97.7 °F (36.5 °C)-98.2 °F (36.8 °C)] 98.2 °F (36.8 °C)  Pulse:  [70-85] 81  Resp:  [16-18] 18  SpO2:  [95  %-100 %] 99 %  BP: (133-181)/() 133/76     Weight: 61.7 kg (136 lb 0.4 oz)  Body mass index is 24.88 kg/m².    Estimated Creatinine Clearance: 99.4 mL/min (based on SCr of 0.5 mg/dL).    Physical Exam   Constitutional: She is oriented to person, place, and time. She appears well-developed and well-nourished. No distress.   Cardiovascular: Normal rate and regular rhythm.   Murmur heard.  Pulmonary/Chest: Effort normal. No respiratory distress. She has decreased breath sounds.   Abdominal: Soft. Bowel sounds are normal. She exhibits no distension.   Musculoskeletal: Normal range of motion. She exhibits no edema.   Neurological: She is alert and oriented to person, place, and time.   Skin: Skin is warm and dry.   Psychiatric: She has a normal mood and affect. Her behavior is normal.       Significant Labs:   Blood Culture:   Recent Labs   Lab 01/24/19  0839 01/25/19  1752 01/26/19  0109 02/19/19  1531 03/08/19  1323   LABBLOO Gram stain aer bottle: Gram positive cocci in clusters resembling Staph   Results called to and read back by: Micheline Montgomery RN 01/25/2019    02:39  METHICILLIN RESISTANT STAPHYLOCOCCUS AUREUS  ID consult required at [facility] and [facility].  For susceptibility see order # [id number]   No growth after 5 days. No growth after 5 days. No growth after 5 days.  No growth after 5 days. No growth after 5 days.  No growth after 5 days.     CBC:   Recent Labs   Lab 03/13/19  0522 03/14/19  0354   WBC 10.83 10.56   HGB 9.0* 9.5*   HCT 29.8* 31.3*    236     CMP:   Recent Labs   Lab 03/13/19  0522 03/14/19  0354   * 133*   K 3.4* 3.2*   CL 94* 95   CO2 29 29   GLU 67* 70   BUN 17 13   CREATININE 0.5 0.5   CALCIUM 9.2 9.2   PROT 6.0 5.8*   ALBUMIN 2.2* 2.2*   BILITOT 2.8* 2.7*   ALKPHOS 678* 659*   * 100*   * 100*   ANIONGAP 9 9   EGFRNONAA >60.0 >60.0     Respiratory Culture:   Recent Labs   Lab 03/10/19  0101   GSRESP <10 epithelial  cells per low power field.  Many WBC's  No organisms seen   RESPIRATORYC No Pseudomonas isolated.  METHICILLIN RESISTANT STAPHYLOCOCCUS AUREUS  Moderate    ACINETOBACTER BAUMANNII COMPLEX  Moderate  Susceptibility pending         Significant Imaging: I have reviewed all pertinent imaging results/findings within the past 24 hours.

## 2019-03-14 NOTE — ASSESSMENT & PLAN NOTE
63 year old female admitted since 1/23; found to have fluid collection right hemipelvis after appendectomy c/b MRSA bacteremia s/p ex-lap, pelvic abscess drainage 1/25- cx's with bacteroides and gram stain with GNR; completed 2 weeks of IV vanc and 5 weeks of cipro/flagyl; ID consulted earlier in march for increasing leukocytosis and further antibiotics; recommend monitoring pt off of abx at that time; on 3/8, pt had elevation of WBC to 27K, fever, and abd pain; CT abd/pelvis 3/8 showed possible necrotizing pneumonia; ID re-consulted now given sputum culture with MRSA and acinetobacter.     Sputum cultures 3/10 with MRSA and acinetobacter (R-zosyn). On day 7 of vanc/zosyn.  Leukocytosis resolved. Afebrile.  Pt on nasal cannula oxygen.     Plan:  1. Recommend continuing vancomycin for now; may switch to oral linezolid to complete course  2. D/c zosyn and start meropenem empirically for acinetobacter (will f/u on susceptibilities)  3. Anticipate 14 days of total therapy  4. Will follow up tomorrow

## 2019-03-15 PROBLEM — Z78.9 ON ENTERAL NUTRITION: Status: RESOLVED | Noted: 2019-02-12 | Resolved: 2019-03-15

## 2019-03-15 PROBLEM — R13.10 DYSPHAGIA: Status: RESOLVED | Noted: 2019-01-30 | Resolved: 2019-03-15

## 2019-03-15 PROBLEM — N39.42 URINARY INCONTINENCE WITHOUT SENSORY AWARENESS: Status: RESOLVED | Noted: 2019-02-17 | Resolved: 2019-03-15

## 2019-03-15 LAB
ALBUMIN SERPL BCP-MCNC: 2.3 G/DL
ALP SERPL-CCNC: 589 U/L
ALT SERPL W/O P-5'-P-CCNC: 77 U/L
ANION GAP SERPL CALC-SCNC: 8 MMOL/L
AST SERPL-CCNC: 55 U/L
BACTERIA SPEC AEROBE CULT: NORMAL
BASOPHILS # BLD AUTO: 0.02 K/UL
BASOPHILS NFR BLD: 0.2 %
BILIRUB SERPL-MCNC: 2.5 MG/DL
BUN SERPL-MCNC: 13 MG/DL
CALCIUM SERPL-MCNC: 9.3 MG/DL
CHLORIDE SERPL-SCNC: 96 MMOL/L
CO2 SERPL-SCNC: 30 MMOL/L
CREAT SERPL-MCNC: 0.6 MG/DL
DIFFERENTIAL METHOD: ABNORMAL
EOSINOPHIL # BLD AUTO: 0.5 K/UL
EOSINOPHIL NFR BLD: 4.6 %
ERYTHROCYTE [DISTWIDTH] IN BLOOD BY AUTOMATED COUNT: 17.2 %
EST. GFR  (AFRICAN AMERICAN): >60 ML/MIN/1.73 M^2
EST. GFR  (NON AFRICAN AMERICAN): >60 ML/MIN/1.73 M^2
GLUCOSE SERPL-MCNC: 76 MG/DL
GRAM STN SPEC: NORMAL
HCT VFR BLD AUTO: 32.1 %
HGB BLD-MCNC: 9.4 G/DL
IMM GRANULOCYTES # BLD AUTO: 0.06 K/UL
IMM GRANULOCYTES NFR BLD AUTO: 0.6 %
LYMPHOCYTES # BLD AUTO: 3.1 K/UL
LYMPHOCYTES NFR BLD: 31.2 %
MAGNESIUM SERPL-MCNC: 1.5 MG/DL
MCH RBC QN AUTO: 30.8 PG
MCHC RBC AUTO-ENTMCNC: 29.3 G/DL
MCV RBC AUTO: 105 FL
MONOCYTES # BLD AUTO: 0.8 K/UL
MONOCYTES NFR BLD: 7.6 %
NEUTROPHILS # BLD AUTO: 5.6 K/UL
NEUTROPHILS NFR BLD: 55.8 %
NRBC BLD-RTO: 0 /100 WBC
PHOSPHATE SERPL-MCNC: 2.5 MG/DL
PLATELET # BLD AUTO: 238 K/UL
PMV BLD AUTO: 12 FL
POCT GLUCOSE: 100 MG/DL (ref 70–110)
POCT GLUCOSE: 126 MG/DL (ref 70–110)
POCT GLUCOSE: 129 MG/DL (ref 70–110)
POCT GLUCOSE: 84 MG/DL (ref 70–110)
POTASSIUM SERPL-SCNC: 3.2 MMOL/L
PROT SERPL-MCNC: 5.9 G/DL
RBC # BLD AUTO: 3.05 M/UL
SODIUM SERPL-SCNC: 134 MMOL/L
VANCOMYCIN SERPL-MCNC: 20.8 UG/ML
WBC # BLD AUTO: 10.04 K/UL

## 2019-03-15 PROCEDURE — 99233 PR SUBSEQUENT HOSPITAL CARE,LEVL III: ICD-10-PCS | Mod: ,,, | Performed by: PHYSICIAN ASSISTANT

## 2019-03-15 PROCEDURE — 63600175 PHARM REV CODE 636 W HCPCS: Performed by: PHYSICIAN ASSISTANT

## 2019-03-15 PROCEDURE — 84100 ASSAY OF PHOSPHORUS: CPT

## 2019-03-15 PROCEDURE — 25000242 PHARM REV CODE 250 ALT 637 W/ HCPCS: Performed by: HOSPITALIST

## 2019-03-15 PROCEDURE — 99232 PR SUBSEQUENT HOSPITAL CARE,LEVL II: ICD-10-PCS | Mod: ,,, | Performed by: INTERNAL MEDICINE

## 2019-03-15 PROCEDURE — 25000003 PHARM REV CODE 250: Performed by: HOSPITALIST

## 2019-03-15 PROCEDURE — 25000003 PHARM REV CODE 250: Performed by: PHYSICIAN ASSISTANT

## 2019-03-15 PROCEDURE — 25000003 PHARM REV CODE 250: Performed by: INTERNAL MEDICINE

## 2019-03-15 PROCEDURE — 94640 AIRWAY INHALATION TREATMENT: CPT

## 2019-03-15 PROCEDURE — 27000221 HC OXYGEN, UP TO 24 HOURS

## 2019-03-15 PROCEDURE — 99232 SBSQ HOSP IP/OBS MODERATE 35: CPT | Mod: ,,, | Performed by: INTERNAL MEDICINE

## 2019-03-15 PROCEDURE — 25000003 PHARM REV CODE 250: Performed by: STUDENT IN AN ORGANIZED HEALTH CARE EDUCATION/TRAINING PROGRAM

## 2019-03-15 PROCEDURE — 92526 ORAL FUNCTION THERAPY: CPT

## 2019-03-15 PROCEDURE — 63600175 PHARM REV CODE 636 W HCPCS: Performed by: HOSPITALIST

## 2019-03-15 PROCEDURE — 85025 COMPLETE CBC W/AUTO DIFF WBC: CPT

## 2019-03-15 PROCEDURE — 94761 N-INVAS EAR/PLS OXIMETRY MLT: CPT

## 2019-03-15 PROCEDURE — 99233 SBSQ HOSP IP/OBS HIGH 50: CPT | Mod: ,,, | Performed by: PHYSICIAN ASSISTANT

## 2019-03-15 PROCEDURE — 97535 SELF CARE MNGMENT TRAINING: CPT

## 2019-03-15 PROCEDURE — 94664 DEMO&/EVAL PT USE INHALER: CPT

## 2019-03-15 PROCEDURE — 83735 ASSAY OF MAGNESIUM: CPT

## 2019-03-15 PROCEDURE — 63600175 PHARM REV CODE 636 W HCPCS: Performed by: STUDENT IN AN ORGANIZED HEALTH CARE EDUCATION/TRAINING PROGRAM

## 2019-03-15 PROCEDURE — 20600001 HC STEP DOWN PRIVATE ROOM

## 2019-03-15 PROCEDURE — 80053 COMPREHEN METABOLIC PANEL: CPT

## 2019-03-15 PROCEDURE — 80202 ASSAY OF VANCOMYCIN: CPT

## 2019-03-15 PROCEDURE — 99900035 HC TECH TIME PER 15 MIN (STAT)

## 2019-03-15 RX ORDER — POTASSIUM CHLORIDE 20 MEQ/1
20 TABLET, EXTENDED RELEASE ORAL 2 TIMES DAILY
Status: DISCONTINUED | OUTPATIENT
Start: 2019-03-15 | End: 2019-03-16 | Stop reason: HOSPADM

## 2019-03-15 RX ORDER — THEOPHYLLINE ANHYDROUS 80 MG/15ML
100 SOLUTION ORAL EVERY 12 HOURS
Status: DISCONTINUED | OUTPATIENT
Start: 2019-03-15 | End: 2019-03-16

## 2019-03-15 RX ORDER — SULFAMETHOXAZOLE AND TRIMETHOPRIM 800; 160 MG/1; MG/1
1 TABLET ORAL 2 TIMES DAILY
Status: DISCONTINUED | OUTPATIENT
Start: 2019-03-15 | End: 2019-03-16 | Stop reason: HOSPADM

## 2019-03-15 RX ORDER — SULFAMETHOXAZOLE AND TRIMETHOPRIM 800; 160 MG/1; MG/1
1 TABLET ORAL 2 TIMES DAILY
Qty: 28 TABLET | Refills: 0 | Status: SHIPPED | OUTPATIENT
Start: 2019-03-15 | End: 2019-03-29

## 2019-03-15 RX ADMIN — HEPARIN SODIUM 5000 UNITS: 5000 INJECTION, SOLUTION INTRAVENOUS; SUBCUTANEOUS at 05:03

## 2019-03-15 RX ADMIN — SULFAMETHOXAZOLE AND TRIMETHOPRIM 1 TABLET: 800; 160 TABLET ORAL at 02:03

## 2019-03-15 RX ADMIN — HYDROXYZINE HYDROCHLORIDE 25 MG: 25 TABLET, FILM COATED ORAL at 10:03

## 2019-03-15 RX ADMIN — HYDROXYZINE HYDROCHLORIDE 25 MG: 25 TABLET, FILM COATED ORAL at 05:03

## 2019-03-15 RX ADMIN — FLUTICASONE FUROATE AND VILANTEROL TRIFENATATE 1 PUFF: 100; 25 POWDER RESPIRATORY (INHALATION) at 09:03

## 2019-03-15 RX ADMIN — TRAMADOL HYDROCHLORIDE 50 MG: 50 TABLET, COATED ORAL at 10:03

## 2019-03-15 RX ADMIN — LOSARTAN POTASSIUM 100 MG: 50 TABLET, FILM COATED ORAL at 08:03

## 2019-03-15 RX ADMIN — POTASSIUM CHLORIDE 20 MEQ: 1500 TABLET, EXTENDED RELEASE ORAL at 11:03

## 2019-03-15 RX ADMIN — HEPARIN SODIUM 5000 UNITS: 5000 INJECTION, SOLUTION INTRAVENOUS; SUBCUTANEOUS at 11:03

## 2019-03-15 RX ADMIN — URSODIOL 500 MG: 250 TABLET, FILM COATED ORAL at 08:03

## 2019-03-15 RX ADMIN — LOPERAMIDE HYDROCHLORIDE 2 MG: 1 SOLUTION ORAL at 08:03

## 2019-03-15 RX ADMIN — GABAPENTIN 300 MG: 300 CAPSULE ORAL at 11:03

## 2019-03-15 RX ADMIN — CLOPIDOGREL 75 MG: 75 TABLET, FILM COATED ORAL at 08:03

## 2019-03-15 RX ADMIN — IPRATROPIUM BROMIDE AND ALBUTEROL SULFATE 3 ML: .5; 3 SOLUTION RESPIRATORY (INHALATION) at 04:03

## 2019-03-15 RX ADMIN — SULFAMETHOXAZOLE AND TRIMETHOPRIM 1 TABLET: 800; 160 TABLET ORAL at 11:03

## 2019-03-15 RX ADMIN — POTASSIUM CHLORIDE 20 MEQ: 1500 TABLET, EXTENDED RELEASE ORAL at 10:03

## 2019-03-15 RX ADMIN — THEOPHYLLINE 100.27 MG: 80 SOLUTION ORAL at 09:03

## 2019-03-15 RX ADMIN — MEROPENEM 2 G: 1 INJECTION, POWDER, FOR SOLUTION INTRAVENOUS at 04:03

## 2019-03-15 RX ADMIN — PANTOPRAZOLE SODIUM 40 MG: 40 GRANULE, DELAYED RELEASE ORAL at 08:03

## 2019-03-15 RX ADMIN — URSODIOL 500 MG: 250 TABLET, FILM COATED ORAL at 11:03

## 2019-03-15 RX ADMIN — THEOPHYLLINE ANHYDROUS 100.27 MG: 80 SOLUTION ORAL at 11:03

## 2019-03-15 RX ADMIN — DULOXETINE 60 MG: 60 CAPSULE, DELAYED RELEASE ORAL at 08:03

## 2019-03-15 RX ADMIN — TRAMADOL HYDROCHLORIDE 50 MG: 50 TABLET, COATED ORAL at 05:03

## 2019-03-15 RX ADMIN — ASPIRIN 81 MG CHEWABLE TABLET 81 MG: 81 TABLET CHEWABLE at 09:03

## 2019-03-15 RX ADMIN — IPRATROPIUM BROMIDE AND ALBUTEROL SULFATE 3 ML: .5; 3 SOLUTION RESPIRATORY (INHALATION) at 08:03

## 2019-03-15 RX ADMIN — IPRATROPIUM BROMIDE AND ALBUTEROL SULFATE 3 ML: .5; 3 SOLUTION RESPIRATORY (INHALATION) at 09:03

## 2019-03-15 RX ADMIN — HEPARIN SODIUM 5000 UNITS: 5000 INJECTION, SOLUTION INTRAVENOUS; SUBCUTANEOUS at 02:03

## 2019-03-15 RX ADMIN — IPRATROPIUM BROMIDE AND ALBUTEROL SULFATE 3 ML: .5; 3 SOLUTION RESPIRATORY (INHALATION) at 01:03

## 2019-03-15 RX ADMIN — LOPERAMIDE HYDROCHLORIDE 2 MG: 1 SOLUTION ORAL at 01:03

## 2019-03-15 RX ADMIN — TRAMADOL HYDROCHLORIDE 50 MG: 50 TABLET, COATED ORAL at 04:03

## 2019-03-15 RX ADMIN — PREDNISONE 10 MG: 10 TABLET ORAL at 08:03

## 2019-03-15 NOTE — PLAN OF CARE
Problem: SLP Goal  Goal: SLP Goal  Speech-Language Pathology Goals  Goals expected to be met by 3/19:   1) Pt will tolerate mechanical soft solids and thin liquids with no overt clinical signs aspiration.    Outcome: Outcome(s) achieved Date Met: 03/15/19    Recommend diet advancement to regular consistency solids. Ongoing thin liquids. No further acute SLP needs at this time. Please re-consult should changes occur.     ALEXANDER Viramontes, CCC-SLP  [phone]  3/15/2019

## 2019-03-15 NOTE — PROGRESS NOTES
Wound care follow-up:  The midline and RLQ incisions have creamy purulent drainage noted in the tunnels.  The patient complains of pain when the skin is cleansed, measured, packed. The patient has not complained of pain during the past dressing changed with wound care. Purulent drainage from tunnel areas.   ID in room at time.  Discussed with Dr. Juan and approved recommendations.    Recommendations:  1. Mid-line lower abdominal incision and RLQ incision- pack wound beds and tunnels with aquacel Ag, cover with telfa island dressing daily.   2. CT of abdomen.   3. HAPI bundle  Wound care to follow prn.        03/15/19 1015       Incision/Site 01/25/19 1058 Right Abdomen   Date First Assessed/Time First Assessed: 01/25/19 1058   Side: Right  Location: Abdomen   Wound Image    Incision WDL ex   Dressing Appearance Intact;Moist drainage   Drainage Amount Small   Drainage Characteristics/Odor Creamy   Appearance Pink;Red;Moist   Red (%), Wound Tissue Color 100 %   Periwound Area Intact;Dry   Wound Edges Open   Wound Length (cm) 1 cm   Wound Width (cm) 4 cm   Wound Depth (cm) 1 cm   Wound Volume (cm^3) 4 cm^3   Wound Surface Area (cm^2) 4 cm^2   Tunneling (depth (cm)/location) 6 @ 3 o'clock   Care Cleansed with:;Sterile normal saline;Applied:;Skin Barrier   Dressing Changed;Gauze;Island/border  (triad ointment)   Packing Inserted  1   Dressing Change Due 03/16/19       Incision/Site 01/25/19 1058 Abdomen   Date First Assessed/Time First Assessed: 01/25/19 1058   Location: Abdomen   Wound Image    Incision WDL ex   Dressing Appearance Intact;Moist drainage   Drainage Amount Small   Drainage Characteristics/Odor Creamy;No odor   Appearance Pink;Moist   Red (%), Wound Tissue Color 100 %   Periwound Area Intact;Dry   Wound Edges Dehisced   Wound Length (cm) 5 cm   Wound Width (cm) 0.5 cm   Wound Depth (cm) 3.5 cm   Wound Volume (cm^3) 8.75 cm^3   Wound Surface Area (cm^2) 2.5 cm^2   Tunneling (depth (cm)/location) 7@  12  o'clock   Care Cleansed with:;Sterile normal saline;Applied:;Skin Barrier   Dressing Changed;Applied;Gauze  (triad ointment)   Packing Incision packed with   Packing Inserted  1   Dressing Change Due 03/16/19   Skin Interventions   Device Skin Pressure Protection positioning supports utilized;pressure points protected   Pressure Reduction Devices specialty bed utilized;heel offloading device utilized   Pressure Reduction Techniques frequent weight shift encouraged;heels elevated off bed;positioned off wounds;pressure points protected;weight shift assistance provided   Skin Protection adhesive use limited;incontinence pads utilized;skin sealant/moisture barrier applied

## 2019-03-15 NOTE — PLAN OF CARE
Patient will d/c today w/ h/h - [facility], pt's dgtr is requesting a hospital bed - CM placed order.     03/15/19 1928   Final Note   Assessment Type Final Discharge Note   Anticipated Discharge Disposition Home-Health   Right Care Referral Info   Post Acute Recommendation Home-care   Referral Type [facility]

## 2019-03-15 NOTE — PLAN OF CARE
SW left message for pt's daughter Citlaly ([phone]) regarding discharge plan for home health. SW waiting to hear back.    1:12 PM  SW spoke with pt's daughter Citlaly. Citlaly states that pt has [facility] in the past and she would like pt to have [facility] upon discharge. Citlaly also states that pt has all the equipment she needs except the hospital bed. SW will request order for hospital bed.     [facility] sent [facility] referral via [facility].     Jasmyne Lei LMSW  [facility]

## 2019-03-15 NOTE — SUBJECTIVE & OBJECTIVE
Interval History: pt is afebrile. Doing well. abd pain while packing being changed by wound care, otherwise, no complaints.     Review of Systems   Constitutional: Negative for chills and fever.   Respiratory: Positive for cough and shortness of breath.    Cardiovascular: Negative for chest pain.   Gastrointestinal: Negative for abdominal pain, nausea and vomiting.   Skin: Negative for rash.   Neurological: Positive for weakness. Negative for numbness.     Objective:     Vital Signs (Most Recent):  Temp: 98 °F (36.7 °C) (03/15/19 0726)  Pulse: 87 (03/15/19 0944)  Resp: 18 (03/15/19 0944)  BP: 137/71 (03/15/19 0726)  SpO2: 98 % (03/15/19 0944) Vital Signs (24h Range):  Temp:  [98 °F (36.7 °C)-98.6 °F (37 °C)] 98 °F (36.7 °C)  Pulse:  [77-97] 87  Resp:  [16-20] 18  SpO2:  [95 %-100 %] 98 %  BP: (126-153)/(57-76) 137/71     Weight: 61.7 kg (136 lb 0.4 oz)  Body mass index is 24.88 kg/m².    Estimated Creatinine Clearance: 82.9 mL/min (based on SCr of 0.6 mg/dL).    Physical Exam   Constitutional: She is oriented to person, place, and time. She appears well-developed and well-nourished. No distress.   Cardiovascular: Normal rate and regular rhythm.   Murmur heard.  Pulmonary/Chest: Effort normal. No respiratory distress. She has decreased breath sounds.   Abdominal: Soft. Bowel sounds are normal. She exhibits no distension.       Musculoskeletal: Normal range of motion. She exhibits no edema.   Neurological: She is alert and oriented to person, place, and time.   Skin: Skin is warm and dry.   Psychiatric: She has a normal mood and affect. Her behavior is normal.       Significant Labs:   Blood Culture:   Recent Labs   Lab 01/24/19  0839 01/25/19  1752 01/26/19  0109 02/19/19  1531 03/08/19  1323   LABBLOO Gram stain aer bottle: Gram positive cocci in clusters resembling Staph   Results called to and read back by: Micheline Montgomery RN 01/25/2019    02:39  METHICILLIN RESISTANT STAPHYLOCOCCUS AUREUS  ID consult required  at [facility],[place],[facility] and [facility]   locations.  For susceptibility see order # [id number]   No growth after 5 days. No growth after 5 days. No growth after 5 days.  No growth after 5 days. No growth after 5 days.  No growth after 5 days.     CBC:   Recent Labs   Lab 03/14/19  0354 03/15/19  0449   WBC 10.56 10.04   HGB 9.5* 9.4*   HCT 31.3* 32.1*    238     CMP:   Recent Labs   Lab 03/14/19  0354 03/15/19  0449   * 134*   K 3.2* 3.2*   CL 95 96   CO2 29 30*   GLU 70 76   BUN 13 13   CREATININE 0.5 0.6   CALCIUM 9.2 9.3   PROT 5.8* 5.9*   ALBUMIN 2.2* 2.3*   BILITOT 2.7* 2.5*   ALKPHOS 659* 589*   * 55*   * 77*   ANIONGAP 9 8   EGFRNONAA >60.0 >60.0     Wound Culture:   Recent Labs   Lab 01/25/19  1000 01/25/19  1002   LABAERO No growth No growth       Significant Imaging: I have reviewed all pertinent imaging results/findings within the past 24 hours.

## 2019-03-15 NOTE — PROGRESS NOTES
[facility]  Infectious Disease  Progress Note    Patient Name: Sheryl Martines  MRN: [id number]  Admission Date: 1/23/2019  Length of Stay: 50 days  Attending Physician: Rosa Juan MD  Primary Care Provider: Primary Doctor No    Isolation Status: Contact  Assessment/Plan:      Pneumonia    63 year old female admitted since 1/23; found to have fluid collection right hemipelvis after appendectomy c/b MRSA bacteremia s/p ex-lap, pelvic abscess drainage 1/25- cx's with bacteroides and gram stain with GNR; completed 2 weeks of IV vanc and 5 weeks of cipro/flagyl; ID consulted earlier in march for increasing leukocytosis and further antibiotics; recommend monitoring pt off of abx at that time; on 3/8, pt had elevation of WBC to 27K, fever, and abd pain; CT abd/pelvis 3/8 showed possible necrotizing pneumonia; ID re-consulted now given sputum culture with MRSA and acinetobacter.     Sputum cultures 3/10 with MRSA and acinetobacter (R-zosyn and meropenem).  Leukocytosis resolved. Afebrile.  Pt on nasal cannula oxygen.     Plan:  1. D/c vanc and meropenem  2. Start bactrim DS PO BID for a total of 14 days (to cover both MRSA and acinetobacter) for necrotizing pneumonia  3. Continue wound care to lower abdomen; monitor abdominal scan; repeat CT scan in a couple of weeks  4. F/u in ID clinic in 2 weeks         Anticipated Disposition: oral abx  Thank you for your consult. I will sign off. Please contact us if you have any additional questions.  KOTA Bernal Pager: [phone]    Infectious Disease  [facility]    Subjective:     Principal Problem:Septic shock    HPI: Pt is a 62 y/o female with hx of asthma, COPD, CAD, DM, HTN, CVA 2012 with R side deficits, HFpEF, severe debility , lap converted to open appendectomy 8/2018 complicated by cdiff, failure to thrive and recurrent fluid collection presented to the ED with intractable n/v and abdominal pain.     Appendectomy complcated by  sx site infxn with recurrent fluid collection.  Cxs- MRSA, Enterococcus, Ecoli and Anaerobes.   Abx treatment Vanc 9/11-10/1;  Pip-tazo 9/11-9/13, 9/23-10/1; Ertapenem 9/14-9/22.  No change in fluid collection.  Abx discontinued on 10/1. Lactic acid 6.8, with tachycardia and WBC of 13.  1/24 CT shows fluid collection in R hemipelvis.  Started on vanc and zosyn. Patient now s/p exploratory laparotomy, washout, and pelvic abscess drainage on 1/25/19. Gross purulence.  Blood cx positive for MRSA on 1/24.     Pt was treated with 2 weeks of IV vanc for MRSA bacteremia and then about 5 weeks of cipro/flagyl. On 3/8, pt with abdominal pain, fever, and elevated WBC count. Infectious work up initiated. CT revealed possible necrotizing pneumonia. Pt started on empiric vanc and zosyn (on day 7). Sputum cultures growing MRSA and acinetobacter. ID consulted for abx recs.     Patient's daughter at bedside. Pt sitting up in chair. Overall, feeling well.     Interval History: pt is afebrile. Doing well. abd pain while packing being changed by wound care, otherwise, no complaints.     Review of Systems   Constitutional: Negative for chills and fever.   Respiratory: Positive for cough and shortness of breath.    Cardiovascular: Negative for chest pain.   Gastrointestinal: Negative for abdominal pain, nausea and vomiting.   Skin: Negative for rash.   Neurological: Positive for weakness. Negative for numbness.     Objective:     Vital Signs (Most Recent):  Temp: 98 °F (36.7 °C) (03/15/19 0726)  Pulse: 87 (03/15/19 0944)  Resp: 18 (03/15/19 0944)  BP: 137/71 (03/15/19 0726)  SpO2: 98 % (03/15/19 0944) Vital Signs (24h Range):  Temp:  [98 °F (36.7 °C)-98.6 °F (37 °C)] 98 °F (36.7 °C)  Pulse:  [77-97] 87  Resp:  [16-20] 18  SpO2:  [95 %-100 %] 98 %  BP: (126-153)/(57-76) 137/71     Weight: 61.7 kg (136 lb 0.4 oz)  Body mass index is 24.88 kg/m².    Estimated Creatinine Clearance: 82.9 mL/min (based on SCr of 0.6 mg/dL).    Physical Exam    Constitutional: She is oriented to person, place, and time. She appears well-developed and well-nourished. No distress.   Cardiovascular: Normal rate and regular rhythm.   Murmur heard.  Pulmonary/Chest: Effort normal. No respiratory distress. She has decreased breath sounds.   Abdominal: Soft. Bowel sounds are normal. She exhibits no distension.       Musculoskeletal: Normal range of motion. She exhibits no edema.   Neurological: She is alert and oriented to person, place, and time.   Skin: Skin is warm and dry.   Psychiatric: She has a normal mood and affect. Her behavior is normal.       Significant Labs:   Blood Culture:   Recent Labs   Lab 01/24/19  0839 01/25/19  1752 01/26/19  0109 02/19/19  1531 03/08/19  1323   LABBLOO Gram stain aer bottle: Gram positive cocci in clusters resembling Staph   Results called to and read back by: Micheline Montgomery RN 01/25/2019    02:39  METHICILLIN RESISTANT STAPHYLOCOCCUS AUREUS  ID consult required at [facility].On license of [facility],[place],[facility] and [facility].  For susceptibility see order # [id number]   No growth after 5 days. No growth after 5 days. No growth after 5 days.  No growth after 5 days. No growth after 5 days.  No growth after 5 days.     CBC:   Recent Labs   Lab 03/14/19  0354 03/15/19  0449   WBC 10.56 10.04   HGB 9.5* 9.4*   HCT 31.3* 32.1*    238     CMP:   Recent Labs   Lab 03/14/19  0354 03/15/19  0449   * 134*   K 3.2* 3.2*   CL 95 96   CO2 29 30*   GLU 70 76   BUN 13 13   CREATININE 0.5 0.6   CALCIUM 9.2 9.3   PROT 5.8* 5.9*   ALBUMIN 2.2* 2.3*   BILITOT 2.7* 2.5*   ALKPHOS 659* 589*   * 55*   * 77*   ANIONGAP 9 8   EGFRNONAA >60.0 >60.0     Wound Culture:   Recent Labs   Lab 01/25/19  1000 01/25/19  1002   LABAERO No growth No growth       Significant Imaging: I have reviewed all pertinent imaging results/findings within the past 24 hours.

## 2019-03-15 NOTE — ASSESSMENT & PLAN NOTE
63 year old female admitted since 1/23; found to have fluid collection right hemipelvis after appendectomy c/b MRSA bacteremia s/p ex-lap, pelvic abscess drainage 1/25- cx's with bacteroides and gram stain with GNR; completed 2 weeks of IV vanc and 5 weeks of cipro/flagyl; ID consulted earlier in march for increasing leukocytosis and further antibiotics; recommend monitoring pt off of abx at that time; on 3/8, pt had elevation of WBC to 27K, fever, and abd pain; CT abd/pelvis 3/8 showed possible necrotizing pneumonia; ID re-consulted now given sputum culture with MRSA and acinetobacter.     Sputum cultures 3/10 with MRSA and acinetobacter (R-zosyn and meropenem).  Leukocytosis resolved. Afebrile.  Pt on nasal cannula oxygen.     Plan:  1. D/c vanc and meropenem  2. Start bactrim DS PO BID for a total of 14 days (to cover both MRSA and acinetobacter) for necrotizing pneumonia  3. Continue wound care to lower abdomen; monitor abdominal scan; repeat CT scan in a couple of weeks  4. F/u in ID clinic in 2 weeks

## 2019-03-15 NOTE — PLAN OF CARE
Problem: Adult Inpatient Plan of Care  Goal: Plan of Care Review  Outcome: Ongoing (interventions implemented as appropriate)    VSS. No acute events. Pt refused Bipap at HS. Frequent weight shift encouraged.Tube feed 20 cc/hr.  Wound care performed. Pt c/o pain to BLLE, managed with PRN tramadol. PRN  Loperamide x 1 administered . Callbell placed within reach and use encouraged.           Problem: Diabetes Comorbidity  Goal: Blood Glucose Level Within Desired Range  Outcome: Ongoing (interventions implemented as appropriate)  BG monitored and  WNL throughout shift.

## 2019-03-15 NOTE — PLAN OF CARE
ION contacted Gabi with [facility] to follow up with hospital bed. Gabi states that she has not gotten to pt's order as of yet, however, it will be completed today. Gabi states she will contact pt's daughter Citlaly to confirm she is prepared to accept the hospital bed.     ION informed daughter Citlaly of this information.     Jasmyne Lei LMSW  [facility]

## 2019-03-15 NOTE — PLAN OF CARE
Problem: Adult Inpatient Plan of Care  Goal: Plan of Care Review  Outcome: Ongoing (interventions implemented as appropriate)  POC reviewed with the pt. Tube feedings stopped as ordered. Pt tolerating diet well and taking medications whole with no complications. Pain medications given as ordered. Will continue to monitor.

## 2019-03-15 NOTE — DISCHARGE SUMMARY
"[facility] Hospital Medicine  Discharge Summary      Patient Name: Sheryl Martines  MRN: [id number]  Admission Date: 1/23/2019  Hospital Length of Stay: 50 days  Discharge Date and Time:  03/15/2019 2:22 PM  Attending Physician: Rosa Juan MD   Discharging Provider: Rosa Juan MD  Primary Care Provider: Primary Doctor [facility] Medicine Team: [facility] MED A Rosa Juan MD    HPI:   Sheryl Martines is a 64 yo female with medical history of asthma, COPD, CAD, h/o T2DM, hypertension, CVA 2012 with R sided deficits, HFpEF, severe debility, and lap converted to open appendectomy 8/2018 c/b C diff, FTT, and a round infection presents to the ED for acute on chronic increased generalized abdominal pain with associated NBNB emesis, nausea and decreased appetite.   Abd pain is normally 5/10 but today is 7/10.  Pain is described as a"ache" and is non-radiating, non-exertional and unrelated to PO intake. These symptoms are waxing and waning since 8/2018 and usually takes zofran for sxs.  Pt states she has had decreased appetite over the past few weeks, intermittently able to keep down solid food, but usually only able to eat things like chicken broth.    She came to ED today since she was weak described as "feeling bad".  Patient states last bowel movement was prior to admission, no diarrhea.  Patient's family unsure if patient has any recent fevers, but patient denies any f/c/night sweats.  Patient denies any vision changes, dizziness, LH, changes in urine output, dysuria, chest pain or shortness of breath.      In ED, HR elevated to 90-110s, BP stable, 100% on RA and afebrile. No leukocytosis but CRP 78 (ESR 17). T bili 1.7 (BL .7 to 1.1).  Alk phos 400s (BL).  Lipase normal, liver enzymes unremarkable. Ua shows 3 hyaline casts, 2+ protein, 1+ ketones, 1+ bilirubin.  Given reglan, zofran, protonix and duonebs in the ED for sxs.  CT A/P with contrast shows "Posterior right hemipelvis rim " "enhancing fluid collection, suspicious for abscess.  Possible associated retained appendicolith.  Adjacent urinary bladder wall thickening, suspicious for superimposed cystitis."  Gen surgery consulted and recs given. CT shows stability in size compared to CT on 9/30/18 and actually has decreased in size.  No emergent intervention recommended.     Procedure(s) (LRB):  LAPAROTOMY, EXPLORATORY (N/A)  INCISION AND DRAINAGE, ABSCESS-  drainage of pelvic abscess (N/A)  EXCISION, ABSCESS- drainage abdominal wall abscess (N/A)  APPLICATION, WOUND VAC (N/A)      Hospital Course:   Patient is a 63 y.o. with chronic diastolic heart failure, COPD on home oxygen at 2 liters, chronic debility, CAD, Type 2 diabetes, HTN, previous CVA with residual right sided weakness, prior appendectomy in 8/2018 complicated by C. Diff infection, failure to thrive admitted to the hospital on 1/23/2019 with nausea vomiting and abdominal pain found to have fluid collection in R hemipelvis. Started on vanc/zosyn. Surgery consulted, s/p ex lap, washout and pelvic abscess drainage 1/25. Post-op course complicated by septic shock secondary to MRSA bacteremia for which she received 2 weeks of IV vancomycin and briefly required pressors.  Wound culture also grew Bacteroides and patient completed 4 weeks of Cipro and Flagyl.  Patient remained intubated postoperatively and was successfully extubated on 01/28.  Initially, she was stabilized and transferred to the floor.  She failed speech language therapy and was started on TPN with subsequent placement of IR guided G-tube in transition to tube feeds on 02/10.  On the floor, patient was making some progress and being followed by [facility]; however, a rapid response was called on 02/19 due to somnolence.  ABG was consistent with acute hypercapnic respiratory failure, and she was placed on BiPAP and transferred back to SICU.  Infectious workup was significant for a UA with 39 WBCs, moderate bacteria, and " moderate yeast; urine culture ultimately grew Candida for which she was started on Diflucan IV.  She was also found to have markedly elevated alk-phos, AST, and ALT.  Hepatology was consulted and felt acute liver injury related to infection versus medication, and not underlying liver disease. And ammonia was checked and was elevated, but hepatology recommended against treatment as they felt patient did not have hepatic encephalopathy.  Right upper quadrant ultrasound showed biliary sludge and gallbladder thickening but was negative for acute cholecystitis.  A subsequent HIDA scan was not consistent with acute cholecystitis; no surgical intervention needed per General surgery evaluation.  Patient's mental status improved in the ICU and is now on nasal cannula oxygen and BiPAP at night.  Medicine initially consulted for assistance with management and noted patient to be markedly fluid overloaded, estimated to be approximately net positive 16 L with this admission.  She had elevated CVP and TTE was consistent volume overload as well as her BNP of > 4900.  She was started on IV diuresis with Lasix with improvement in urine output.  She is to be stepped down to Hospital Medicine for further management and ultimately skilled nursing facility placement.   3/9 - Transfer back to MICU for SEptic Shock requiring vasopressor support - etiology is RLL Pneumonia noted on CT Abdomen/pelvis, no new intraabdominal source of infection. She improved and was weaned back down to home  2L nasal cannula. Respiratory cultures grew Resistant Acinetobacter and MRSA so ID was consulted for antibiotic recommendations. ID recommended 2 additional weeks of Bactrim for necrotizing pneumonia and f/u with ID in 2 weeks.  Initially patient and family wanted to go to [facility] but now interested in [facility]. [facility] with wound care was set up. Her diet was advanced by SLP and she tolerated well. Tube feeds were held on 3/15/19. If her PO intake remains  adequate, she will f/u with surgery for PEG tube removal.     Discharge held on 3/15/19 until hospital bed was delivered. She is now stable for discharge home today.    Consults:   Consults (From admission, onward)        Status Ordering Provider     Inpatient consult to Cardiology  Once     Provider:  (Not yet assigned)    Completed GEORGINA HEART     Inpatient consult to Cardiology  Once     Provider:  (Not yet assigned)    Completed SOUMYA DOOLEY     Inpatient Consult to Critical Care  Once     Provider:  (Not yet assigned)    Completed JENNY AKINS     Inpatient consult to Endocrinology  Once     Provider:  (Not yet assigned)    Completed GEORGINA HEART     Inpatient consult to Endocrinology  Once     Provider:  (Not yet assigned)    Completed KELSI VAZQUEZ     Inpatient consult to ENT  Once     Provider:  (Not yet assigned)    Completed KRISTEN LAWS     Inpatient consult to General surgery  Once     Provider:  (Not yet assigned)    Completed JR DONAHUE     Inpatient consult to Hepatology  Once     Provider:  (Not yet assigned)    Completed KRISTEN LAWS     Inpatient consult to [facility] Medicine-General  Once     Provider:  (Not yet assigned)    Completed JOHN HANSEN     Inpatient consult to Infectious Diseases  Once     Provider:  (Not yet assigned)    Completed MARLI SHEFFIELD     Inpatient consult to Infectious Diseases  Once     Provider:  (Not yet assigned)    Completed DERRICK STILES     Inpatient consult to Infectious Diseases  Once     Provider:  (Not yet assigned)    Completed NKECHI AREVALO     Inpatient consult to Infectious Diseases  Once     Provider:  (Not yet assigned)    Completed DORIAN REGAN     Inpatient consult to Midline team  Once     Provider:  (Not yet assigned)    Completed MOY AKINS     Inpatient consult to Midline team  Once     Provider:  (Not yet assigned)    Completed DERRICK NICKERSON     Inpatient consult to Midline team   Once     Provider:  (Not yet assigned)    Completed CAS CORRALES     Inpatient consult to Midline team  Once     Provider:  (Not yet assigned)    Completed CAS CORRALES     Inpatient consult to Nephrology  Once     Provider:  (Not yet assigned)    Completed MARLI SHEFFIELD     [facility]  Once     Provider:  (Not yet assigned)    Completed KELSI VAZQUEZ     Inpatient consult to Registered Dietitian/Nutritionist  Once     Provider:  (Not yet assigned)    Completed CAS ANNE     Inpatient consult to [facility]  Once     Provider:  (Not yet assigned)    Completed MOY AKINS          No new Assessment & Plan notes have been filed under this hospital service since the last note was generated.  Service: Hospital Medicine    Final Active Diagnoses:    Diagnosis Date Noted POA    Pneumonia [J18.9] 03/14/2019 No    Wide nasal bridge [Q30.8] 03/11/2019 Not Applicable    Right lower lobe pneumonia [J18.1] 03/09/2019 No    Proctitis [K62.89] 03/09/2019 No    Adrenal insufficiency [E27.40]  No    Chronic respiratory failure with hypoxia and hypercapnia [J96.11, J96.12] 03/08/2019 Yes    Other specified anemias [D64.89] 02/25/2019 Yes    Generalized abdominal pain [R10.84] 02/24/2019 Yes    Abnormal thyroid function test [R94.6] 02/20/2019 Yes    Elevated liver enzymes [R74.8] 02/15/2019 No    Alteration in skin integrity due to moisture [R23.9] 01/31/2019 Yes    Multiple skin tears [T14.8XXA] 01/29/2019 Yes    Alteration in skin integrity related to surgical incision [R23.9] 01/29/2019 Yes    Depression [F32.9] 01/25/2019 Yes    Debility [R53.81] 01/24/2019 Yes    Severe malnutrition [E43] 09/21/2018 Yes    Moderate asthma without complication [J45.909] 09/11/2018 Yes    Type 2 diabetes mellitus without complication, without long-term current use of insulin [E11.9] 08/23/2018 Yes    CVA, old, hemiparesis [I69.359] 08/23/2018 Not Applicable     Coronary artery disease involving native coronary artery of native heart without angina pectoris [I25.10] 08/23/2018 Yes      Problems Resolved During this Admission:    Diagnosis Date Noted Date Resolved POA    PRINCIPAL PROBLEM:  Septic shock [A41.9, R65.21] 03/08/2019 03/13/2019 No    Hypomagnesemia [E83.42] 02/27/2019 03/13/2019 Yes    Hypophosphatemia [E83.39] 02/26/2019 03/13/2019 Yes    Hypokalemia [E87.6] 02/25/2019 03/13/2019 Yes    Bradycardia [R00.1] 02/22/2019 03/10/2019 No    Acute respiratory failure with hypoxia and hypercarbia [J96.01, J96.02] 02/21/2019 03/10/2019 No    Acute cystitis [N30.00] 02/20/2019 02/26/2019 Yes    Urinary incontinence without sensory awareness [N39.42] 02/17/2019 03/15/2019 Yes    On enteral nutrition [Z78.9] 02/12/2019 03/15/2019 No    Dysphagia [R13.10] 01/30/2019 03/15/2019 No    Idioventricular rhythm [I44.2]  03/10/2019 Yes    Hypophosphatemia [E83.39] 01/25/2019 02/21/2019 No    Pelvic abscess in female [N73.9] 01/25/2019 03/13/2019 Yes    Severe sepsis [A41.9, R65.20] 01/24/2019 02/02/2019 No    Acute renal failure superimposed on stage 3 chronic kidney disease [N17.9, N18.3] 12/13/2018 02/24/2019 Yes    Hypomagnesemia [E83.42] 11/01/2018 01/25/2019 Yes    Acute metabolic encephalopathy [G93.41]  03/10/2019 Yes    Essential hypertension [I10] 09/21/2018 02/20/2019 Yes    Acute on chronic diastolic (congestive) heart failure [I50.33] 09/11/2018 03/10/2019 Yes       Discharged Condition: good    Disposition: Home-Health Care c    Follow Up:  Follow-up Information     Primary care doctor In 1 week.    Why:  Hospital follow-up           Abimbola Perez MD In 2 weeks.    Specialty:  Infectious Diseases  Why:  hospital follow-up for pneumonia  Contact information:  [address] [facility] [address]  [phone]             Monster Laurent MD In 2 weeks.    Specialty:  General Surgery  Why:  hospital follow-up  Contact information:  [id number]  [facility] [address]  [phone]                 Patient Instructions:      Diet Cardiac     Activity as tolerated       Significant Diagnostic Studies: Labs:   BMP:   Recent Labs   Lab 03/14/19  0354 03/15/19  0449   GLU 70 76   * 134*   K 3.2* 3.2*   CL 95 96   CO2 29 30*   BUN 13 13   CREATININE 0.5 0.6   CALCIUM 9.2 9.3   MG 1.5* 1.5*   , CBC   Recent Labs   Lab 03/14/19  0354 03/15/19  0449   WBC 10.56 10.04   HGB 9.5* 9.4*   HCT 31.3* 32.1*    238    and All labs within the past 24 hours have been reviewed    Pending Diagnostic Studies:     Procedure Component Value Units Date/Time    Ammonia [[id number]] Collected:  02/19/19 1441    Order Status:  Sent Lab Status:  In process Updated:  02/19/19 1441    Specimen:  Blood     CBC with Automated Differential [[id number]] Collected:  01/31/19 0820    Order Status:  Sent Lab Status:  In process Updated:  01/31/19 0821    Specimen:  Blood     Calcium, ionized [[id number]] Collected:  02/17/19 0442    Order Status:  Sent Lab Status:  In process Updated:  02/17/19 0742    Specimen:  Blood     Comprehensive Metabolic Panel (CMP) [[id number]] Collected:  02/17/19 0442    Order Status:  Sent Lab Status:  In process Updated:  02/17/19 0742    Specimen:  Blood     Magnesium [[id number]] Collected:  02/17/19 0442    Order Status:  Sent Lab Status:  In process Updated:  02/17/19 0742    Specimen:  Blood     Phosphorus [[id number]] Collected:  02/17/19 0442    Order Status:  Sent Lab Status:  In process Updated:  02/17/19 0742    Specimen:  Blood     Potassium - if not ordered in last 24 hours [[id number]] Collected:  01/27/19 1259    Order Status:  Sent Lab Status:  In process Updated:  01/27/19 1259    Specimen:  Blood     VANCOMYCIN, TROUGH before next dose [[id number]] Collected:  01/28/19 0800    Order Status:  Sent Lab Status:  In process Updated:  01/28/19 0806    Specimen:  Blood     Vancomycin, random [[id number]] Collected:  02/23/19 0454     Order Status:  Sent Lab Status:  In process Updated:  02/23/19 7258    Specimen:  Blood          Medications:  Reconciled Home Medications:      Medication List      START taking these medications    lidocaine 5 % Oint ointment  Commonly known as:  XYLOCAINE  Apply topically every 6 (six) hours as needed (Knee pain).     sulfamethoxazole-trimethoprim 800-160mg 800-160 mg Tab  Commonly known as:  BACTRIM DS  Take 1 tablet by mouth 2 (two) times daily. for 14 days        CHANGE how you take these medications    ondansetron 4 MG tablet  Commonly known as:  ZOFRAN  Take 1 tablet (4 mg total) by mouth every 6 (six) hours as needed.  What changed:  Another medication with the same name was removed. Continue taking this medication, and follow the directions you see here.        CONTINUE taking these medications    acetaminophen 500 MG tablet  Commonly known as:  TYLENOL  Take 1,000 mg by mouth 2 (two) times daily as needed for Pain.     albuterol 90 mcg/actuation inhaler  Commonly known as:  PROVENTIL/VENTOLIN HFA  Inhale 2 puffs into the lungs every 6 (six) hours as needed for Wheezing or Shortness of Breath. Rescue     albuterol-ipratropium 2.5 mg-0.5 mg/3 mL nebulizer solution  Commonly known as:  DUO-NEB  Take 3 mLs by nebulization every 4 (four) hours as needed for Wheezing or Shortness of Breath. Rescue     aspirin 81 MG EC tablet  Commonly known as:  ECOTRIN  Take 1 tablet (81 mg total) by mouth once daily.     BREO ELLIPTA 100-25 mcg/dose diskus inhaler  Generic drug:  fluticasone-vilanterol  Inhale 1 puff into the lungs once daily. Controller     clopidogrel 75 mg tablet  Commonly known as:  PLAVIX  Take 75 mg by mouth once daily.     COZAAR 100 MG tablet  Generic drug:  losartan  Take 100 mg by mouth once daily.     DULoxetine 60 MG capsule  Commonly known as:  CYMBALTA  Take 60 mg by mouth once daily.     gabapentin 300 MG capsule  Commonly known as:  NEURONTIN  Take 300 mg by mouth once daily.     Lactobacillus  rhamnosus-fiber 2.5 billion cell-3.5 gram Pwpk  Take 1 capsule by mouth.     lansoprazole 30 MG capsule  Commonly known as:  PREVACID  Take 30 mg by mouth once daily.     predniSONE 10 MG tablet  Commonly known as:  DELTASONE  Take 4 tablets (40 mg) on 12/16, then take 1 tablet (10 mg) daily     pyridoxine (vitamin B6) 50 MG Tab  Commonly known as:  VITAMIN B-6  Take 1 tablet (50 mg total) by mouth once daily.     simvastatin 40 MG tablet  Commonly known as:  ZOCOR  Take 40 mg by mouth.     theophylline 300 mg 24 hr capsule  Commonly known as:  THEODUR  Take 300 mg by mouth once daily.     traMADol 50 mg tablet  Commonly known as:  ULTRAM  TK 1 T PO BID PRN     VOLTAREN 1 % Gel  Generic drug:  diclofenac sodium  Apply 2 g topically daily as needed (PAIN).        STOP taking these medications    baclofen 10 MG tablet  Commonly known as:  LIORESAL     cefdinir 300 MG capsule  Commonly known as:  OMNICEF     cephALEXin 750 MG capsule  Commonly known as:  KEFLEX     ciprofloxacin HCl 250 MG tablet  Commonly known as:  CIPRO     diltiaZEM 180 MG 24 hr capsule  Commonly known as:  CARDIZEM CD     furosemide 40 MG tablet  Commonly known as:  LASIX     HYDROcodone-acetaminophen 5-325 mg per tablet  Commonly known as:  NORCO     insulin detemir U-100 100 unit/mL (3 mL) Inpn pen  Commonly known as:  LEVEMIR FLEXTOUCH     potassium chloride SA 10 MEQ tablet  Commonly known as:  K-DUR,KLOR-CON     prazosin 5 MG capsule  Commonly known as:  MINIPRESS            Indwelling Lines/Drains at time of discharge:   Lines/Drains/Airways     Central Venous Catheter Line                 Port A Cath Single Lumen right subclavian -- days          Drain                 Gastrostomy/Enterostomy 03/02/19 1600 LUQ 12 days                Time spent on the discharge of patient: 30 minutes  Patient was seen and examined on the date of discharge and determined to be suitable for discharge.         Rosa Juan MD  Department of Hospital  Medicine  [facility]

## 2019-03-15 NOTE — PROGRESS NOTES
[facility] Hospital Medicine                                                                     Progress Note     Team: [facility] HOSP MED A Rosa Juan MD   Admit Date: 1/23/2019   Hospital Day: 50  TAMIKO: 3/15/2019   Code status: Full Code   Principal Problem: Septic shock     SUMMARY:     Patient is a 63 y.o. with chronic diastolic heart failure, COPD on home oxygen at 2 liters, chronic debility, CAD, Type 2 diabetes, HTN, previous CVA with residual right sided weakness, prior appendectomy in 8/2018 complicated by C. Diff infection, failure to thrive admitted to the hospital on 1/23/2019 with nausea vomiting and abdominal pain found to have fluid collection in R hemipelvis. Started on vanc/zosyn. Surgery consulted, s/p ex lap, washout and pelvic abscess drainage 1/25. Post-op course complicated by septic shock secondary to MRSA bacteremia for which she received 2 weeks of IV vancomycin and briefly required pressors.  Wound culture also grew Bacteroides and patient completed 4 weeks of Cipro and Flagyl.  Patient remained intubated postoperatively and was successfully extubated on 01/28.  Initially, she was stabilized and transferred to the floor.  She failed speech language therapy and was started on TPN with subsequent placement of IR guided G-tube in transition to tube feeds on 02/10.  On the floor, patient was making some progress and being followed by [facility]; however, a rapid response was called on 02/19 due to somnolence.  ABG was consistent with acute hypercapnic respiratory failure, and she was placed on BiPAP and transferred back to SICU.  Infectious workup was significant for a UA with 39 WBCs, moderate bacteria, and moderate yeast; urine culture ultimately grew Candida for which she was started on Diflucan IV.  She was also found to have markedly  elevated alk-phos, AST, and ALT.  Hepatology was consulted and felt acute liver injury related to infection versus medication, and not underlying liver disease. And ammonia was checked and was elevated, but hepatology recommended against treatment as they felt patient did not have hepatic encephalopathy.  Right upper quadrant ultrasound showed biliary sludge and gallbladder thickening but was negative for acute cholecystitis.  A subsequent HIDA scan was not consistent with acute cholecystitis; no surgical intervention needed per General surgery evaluation.  Patient's mental status improved in the ICU and is now on nasal cannula oxygen and BiPAP at night.  Medicine initially consulted for assistance with management and noted patient to be markedly fluid overloaded, estimated to be approximately net positive 16 L with this admission.  She had elevated CVP and TTE was consistent volume overload as well as her BNP of > 4900.  She was started on IV diuresis with Lasix with improvement in urine output.  She is to be stepped down to Hospital Medicine for further management and ultimately skilled nursing facility placement.    3/9 - Transfer back to MICU for SEptic Shock requiring vasopressor support - etiology is RLL Pneumonia noted on CT Abdomen/pelvis, no new intraabdominal source of infection. She improved and was weaned back down to home  2L nasal cannula. Respiratory cultures grew Resistant Acinetobacter and MRSA so ID was consulted for antibiotic recommendations. Initially patient and family wanted to go to [facility] but now interested in [facility].    SUBJECTIVE:   Pt seen and examined at bedside. She reports uneventful night. She is eating a little bit more (reports ate 75% of dinner last night).  Antibiotics changed per ID. Discussed with her daughter at bedside, updated on POC.        Review of Systems   Constitutional: Negative for chills and fever.   Respiratory: Positive for cough. Negative for wheezing.     Cardiovascular: Negative for chest pain.   Gastrointestinal: Negative for abdominal pain and vomiting.   Neurological: Positive for weakness.       OBJECTIVE:     Vitals:  Temp:  [98 °F (36.7 °C)-98.6 °F (37 °C)]   Pulse:  [77-97]   Resp:  [16-20]   BP: (126-153)/(57-76)   SpO2:  [95 %-100 %]      I & O (Last 24H):     Intake/Output Summary (Last 24 hours) at 3/15/2019 0951  Last data filed at 3/14/2019 1800  Gross per 24 hour   Intake 600 ml   Output --   Net 600 ml       Physical Exam   Constitutional: She is oriented to person, place, and time. No distress.   Neck: No JVD present.   Cardiovascular: Normal rate and regular rhythm.   Murmur heard.  Pulmonary/Chest: Effort normal. No respiratory distress. She has no wheezes.   Decreased Breath sounds  2L nasal cannula   Abdominal: Soft. Bowel sounds are normal. There is no tenderness.   PEG in place. Surrounding skin clean and dry   Lymphadenopathy:     She has no cervical adenopathy.   Neurological: She is alert and oriented to person, place, and time.   Skin: Skin is warm and dry.   Abdominal wounds with dressing in place   Psychiatric: She has a normal mood and affect. Her behavior is normal. Thought content normal.   Nursing note and vitals reviewed.      All recent labs and imaging has been reviewed.     Recent Results (from the past 24 hour(s))   POCT glucose    Collection Time: 03/14/19  5:48 PM   Result Value Ref Range    POCT Glucose 139 (H) 70 - 110 mg/dL   POCT glucose    Collection Time: 03/14/19 10:38 PM   Result Value Ref Range    POCT Glucose 111 (H) 70 - 110 mg/dL   Comprehensive Metabolic Panel (CMP)    Collection Time: 03/15/19  4:49 AM   Result Value Ref Range    Sodium 134 (L) 136 - 145 mmol/L    Potassium 3.2 (L) 3.5 - 5.1 mmol/L    Chloride 96 95 - 110 mmol/L    CO2 30 (H) 23 - 29 mmol/L    Glucose 76 70 - 110 mg/dL    BUN, Bld 13 8 - 23 mg/dL    Creatinine 0.6 0.5 - 1.4 mg/dL    Calcium 9.3 8.7 - 10.5 mg/dL    Total Protein 5.9 (L) 6.0 - 8.4  g/dL    Albumin 2.3 (L) 3.5 - 5.2 g/dL    Total Bilirubin 2.5 (H) 0.1 - 1.0 mg/dL    Alkaline Phosphatase 589 (H) 55 - 135 U/L    AST 55 (H) 10 - 40 U/L    ALT 77 (H) 10 - 44 U/L    Anion Gap 8 8 - 16 mmol/L    eGFR if African American >60.0 >60 mL/min/1.73 m^2    eGFR if non African American >60.0 >60 mL/min/1.73 m^2   CBC auto differential    Collection Time: 03/15/19  4:49 AM   Result Value Ref Range    WBC 10.04 3.90 - 12.70 K/uL    RBC 3.05 (L) 4.00 - 5.40 M/uL    Hemoglobin 9.4 (L) 12.0 - 16.0 g/dL    Hematocrit 32.1 (L) 37.0 - 48.5 %     (H) 82 - 98 fL    MCH 30.8 27.0 - 31.0 pg    MCHC 29.3 (L) 32.0 - 36.0 g/dL    RDW 17.2 (H) 11.5 - 14.5 %    Platelets 238 150 - 350 K/uL    MPV 12.0 9.2 - 12.9 fL    Immature Granulocytes 0.6 (H) 0.0 - 0.5 %    Gran # (ANC) 5.6 1.8 - 7.7 K/uL    Immature Grans (Abs) 0.06 (H) 0.00 - 0.04 K/uL    Lymph # 3.1 1.0 - 4.8 K/uL    Mono # 0.8 0.3 - 1.0 K/uL    Eos # 0.5 0.0 - 0.5 K/uL    Baso # 0.02 0.00 - 0.20 K/uL    nRBC 0 0 /100 WBC    Gran% 55.8 38.0 - 73.0 %    Lymph% 31.2 18.0 - 48.0 %    Mono% 7.6 4.0 - 15.0 %    Eosinophil% 4.6 0.0 - 8.0 %    Basophil% 0.2 0.0 - 1.9 %    Differential Method Automated    Magnesium    Collection Time: 03/15/19  4:49 AM   Result Value Ref Range    Magnesium 1.5 (L) 1.6 - 2.6 mg/dL   Phosphorus    Collection Time: 03/15/19  4:49 AM   Result Value Ref Range    Phosphorus 2.5 (L) 2.7 - 4.5 mg/dL   Vancomycin, random    Collection Time: 03/15/19  4:49 AM   Result Value Ref Range    Vancomycin, Random 20.8 Not established ug/mL   POCT glucose    Collection Time: 03/15/19  6:14 AM   Result Value Ref Range    POCT Glucose 129 (H) 70 - 110 mg/dL   POCT glucose    Collection Time: 03/15/19  7:24 AM   Result Value Ref Range    POCT Glucose 84 70 - 110 mg/dL       Recent Labs   Lab 03/14/19  0552 03/14/19  0754 03/14/19  1748 03/14/19  2238 03/15/19  0614 03/15/19  0724   POCTGLUCOSE 70 82 139* 111* 129* 84        Active Hospital Problems     Diagnosis  POA    Pneumonia [J18.9]  No    Wide nasal bridge [Q30.8]  Not Applicable    Right lower lobe pneumonia [J18.1]  No    Proctitis [K62.89]  No    Adrenal insufficiency [E27.40]  No    Chronic respiratory failure with hypoxia and hypercapnia [J96.11, J96.12]  Yes    Other specified anemias [D64.89]  Yes    Generalized abdominal pain [R10.84]  Yes    Abnormal thyroid function test [R94.6]  Yes    Urinary incontinence without sensory awareness [N39.42]  Yes    Elevated liver enzymes [R74.8]  No    On enteral nutrition [Z78.9]  No    Alteration in skin integrity due to moisture [R23.9]  Yes    Dysphagia [R13.10]  No    Multiple skin tears [T14.8XXA]  Yes    Alteration in skin integrity related to surgical incision [R23.9]  Yes    Depression [F32.9]  Yes    Debility [R53.81]  Yes    Severe malnutrition [E43]  Yes    Moderate asthma without complication [J45.909]  Yes    Type 2 diabetes mellitus without complication, without long-term current use of insulin [E11.9]  Yes    CVA, old, hemiparesis [I69.359]  Not Applicable    Coronary artery disease involving native coronary artery of native heart without angina pectoris [I25.10]  Yes      Resolved Hospital Problems    Diagnosis Date Resolved POA    *Septic shock [A41.9, R65.21] 03/13/2019 No    Hypomagnesemia [E83.42] 03/13/2019 Yes    Hypophosphatemia [E83.39] 03/13/2019 Yes    Hypokalemia [E87.6] 03/13/2019 Yes    Bradycardia [R00.1] 03/10/2019 No    Acute respiratory failure with hypoxia and hypercarbia [J96.01, J96.02] 03/10/2019 No    Acute cystitis [N30.00] 02/26/2019 Yes    Idioventricular rhythm [I44.2] 03/10/2019 Yes    Hypophosphatemia [E83.39] 02/21/2019 No    Pelvic abscess in female [N73.9] 03/13/2019 Yes    Severe sepsis [A41.9, R65.20] 02/02/2019 No    Acute renal failure superimposed on stage 3 chronic kidney disease [N17.9, N18.3] 02/24/2019 Yes    Hypomagnesemia [E83.42] 01/25/2019 Yes    Acute metabolic  encephalopathy [G93.41] 03/10/2019 Yes    Essential hypertension [I10] 02/20/2019 Yes    Acute on chronic diastolic (congestive) heart failure [I50.33] 03/10/2019 Yes          ASSESSMENT AND PLAN:     Septic shock  Right Lower Lobe Pneumonia   -Blood Cultures show No growth  -s/p 1day MICU stay, repeat ICU txfr.   -Stress Dose steroids weaned for patient to resume chronic prednisone by 3/12  -Sputum Culture with resistant Acinetobacter and MRSA  -Continue Vanc and Merrem.   -ID following, appreciate input     Pelvic Abscesses/Generalized abdominal pain  Due to recent Pelvic abscess in female  CT abdomen showed a 4 x 3 cm enhancing fluid collection consistent with abscess.  She also had elevated lactate of 6.8.  She was admitted to the STICU service (Dr. Laurent) for initial management. Patient taken to OR on 01/25 for ex lap with drainage of abscess.  Postoperative course complicated by septic shock secondary to MRSA bacteremia for which she received 2 weeks of IV vancomycin and briefly required pressors, which have now been weaned off.  Wound culture also grew Bacteroides and patient completed 4 weeks of Cipro and Flagyl.  Patient remained intubated postoperatively and was successfully extubated on 01/28.  Initially, she was stabilized and transferred to the floor.     On the floor, patient was making some progress and being followed by [facility]; however, a rapid response was called on 02/19 due to somnolence.  ABG was consistent with acute hypercapnic respiratory  failure, and she was placed on BiPAP and transferred back to SICU.  Infectious workup was significant for a UA with 39 WBCs, moderate bacteria, and moderate yeast; urine culture ultimately grew Candida for which she was started on Diflucan IV.    3/3 surgery looked at wounds and deferred to wound care    Elevated liver enzymes - improving   She was also found to have markedly elevated alk-phos, AST, and ALT.  Hepatology was consulted and felt acute  liver injury related to infection versus medication, and not underlying liver disease. And ammonia was checked and was elevated, but hepatology recommended against treatment as they felt patient did not have hepatic encephalopathy.  Right upper quadrant ultrasound showed biliary sludge and gallbladder thickening but was negative for acute cholecystitis.  A subsequent HIDA scan was not consistent with acute cholecystitis; no surgical intervention needed per General surgery evaluation.    - re-consulted hepatology 2/27 who thought this could be drug induced.  DO not use cipro.  Recommend a trial of ursodiol 600mg BID to help with cholestasis with peak Bili of 12.  If not better in two weeks may need biopsy.   - diarrhea may be bile induced, monitor for improvement and consider cholestyramine if not better      Moderate asthma without complication/ Chronic Hypoxic & Hypercapnic respiratory failure  -Requires Nightly BIPAP   >Prone to hypercapnic narcosis  -Continue q4WAKE - Duo Nebs.    . Steroid dependant asthma - chronically on Prednisone 10mg.   - wean o2  - bipap qhs and prn for AMS   - c/w daily breo, theophylline    Debility  Mostly wheelchair bound, but able to work with [facility]  using walker  - Family requesting to go home with [facility]    Dysphagia/Severe Malnutrition   She failed speech language therapy and was started on TPN with subsequent placement of IR guided G-tube in transition to tube feeds on 02/10. Surgery replaced tube 3/2 when pt pulled it out.  - Hold tube feeds today since appetite improving  - SLP saw pt 3/12. Advance to mechanical soft with thin liquids     Coronary artery disease involving native coronary artery of native heart without angina pectoris  - c/w ASA, plavix, statin  - no anginal equivalent    Type 2 diabetes mellitus without complication, without long-term current use of insulin  -- acceptable; continue with current treatment & monitor         Resolved Issues   Acute on chronic  diastolic (congestive) heart failure - resolved  Patient's mental status improved in the ICU and is now on nasal cannula oxygen and BiPAP at night.  Medicine initially consulted 2/22 for assistance with management and noted patient to be markedly fluid overloaded, estimated to be approximately net positive 16 L with this admission.  Weight had increased from 71-75kg to 87kg.   She had elevated CVP and TTE was consistent volume overload as well as her BNP of > 4900.     2/22 IV diuresis with Lasix 40 bid  with improvement in urine output.  3/2 weight down to 73kg which is near her dry weight per epic but BNP was 2600;   -3/5 switched to PO lasix as her weight is now 66kg and exam is improved.  DTR states that patient at home is prone to dehydration with lasix so it is not used daily.  - Lasix PO every other day for now    Hypomagnesemia - resolved  Acute respiratory failure with hypoxia and hypercarbia - resolved  Bradycardia - resolved    Prophylaxis- Hep TID    Code Status- Full Code     Discharge plan and follow up - [facility] pending antibiotic recommendations           Rosa Juan MD  Attending Physician  [facility] Medicine Dept.

## 2019-03-15 NOTE — PT/OT/SLP PROGRESS
"       Speech Language Pathology Treatment/          Discharge Summary    Patient Name:  Sheryl Martines   MRN:  [id number]   8078/[id number] A    Admitting Diagnosis: Septic shock    Recommendations:                 General Recommendations:  Follow-up not indicated  Diet recommendations:  Regular, Liquid Diet Level: Thin   Aspiration Precautions:   · Fully awake and alert for PO intake- should pt experience dec'd LIANNA, NPO warranted  · Fully upright position for PO intake- pt requires assistance to achieve fully upright position for PO intake  · Small bites/ sips  · Slow rate of eating/ drinking  · 1 bite/ sip @ a time  · Refrain from talking prior to swallow completion   · Discontinue PO upon: coughing, throat clearing, choking, wet vocal quality, wet breath sounds, watery eyes, reddened facial features  General Precautions: Standard, fall, respiratory  Communication strategies:  go to room if call light pushed    Subjective     "I'm just itchy.. I think because my skin is dry."     Pain/Comfort:  · Pain Rating 1: 0/10  · Pain Rating Post-Intervention 1: 0/10    Objective:     Has the patient been evaluated by SLP for swallowing?   Yes  Keep patient NPO? No   Current Respiratory Status: nasal cannula      Pt awake and alert upon entry. HOB raised. Pt observed with ~4-5oz thin water via cup sip x1 in isolation and multiple straw sips in isolation as well as regular consistency solid liquid wash and PO trials regular consistency solid shaka cracker x1 via bites x4. Oral phase appeared WFL and no overt clinical signs aspiration observed. Extensive education provided re: ongoing strict and consistent implementation of all aspiration precautions listed above, dec'd LIANNA warranting NPO, immediate termination of PO upon initial observation of any of the above listed overt clinical signs aspiration, and updated SLP POC. Encouragement provided to request SLP re-consult should she experience any swallowing changes. Pt verbalized " understanding of education provided and agreement with SLP POC. White board updated. No further questions.     Results discussed with NSG who verbalized understanding of- and agreement with- SLP POC.     Assessment:     Sheryl Martines is a 63 y.o. female with no further acute SLP needs at this time. Please re-consult should changes occur.     Goals:   Multidisciplinary Problems     SLP Goals     Not on file          Multidisciplinary Problems (Resolved)        Problem: SLP Goal    Goal Priority Disciplines Outcome   SLP Goal   (Resolved)     SLP Outcome(s) achieved   Description:  Speech Language Pathology Goals  Goals expected to be met by 3/14/19  1. Pt will participate in further, instrumental assessment via MBSS when appropriate.   2. Educate Pt and family on S/S aspiration and aspiration precautions   3. Pt will complete dysphagia exercises x10 ea with good effort 90% of attempts to improve BOT coordination and pharyngeal strength/coordination.   4. Pt will independently complete simple problem solving tasks with 75% accuracy.  5. Pt will follow complex directions with 80% accuracy and min assist.  6. Pt will maintain alertness for entire session with min assist.  7. Pt will recall 3/4 unrelated objects with a 5-minute interval and min assist.  8. Pt will complete simple categorization tasks with 75% accuracy and min assist.  9. Pt will participate in reading/writing/visuospatial/additional cognitive-linguistic assessment in order to determine most effective POC.                         Problem: SLP Goal    Goal Priority Disciplines Outcome   SLP Goal   (Resolved)     SLP Outcome(s) achieved   Description:  Speech-Language Pathology Goals  Goals expected to be met by 3/19:   1) Pt will tolerate mechanical soft solids and thin liquids with no overt clinical signs aspiration.                   Plan:     · Plan of Care reviewed with:  patient   · SLP Follow-Up:  No       Discharge recommendations:  nursing  facility, skilled     Time Tracking:     SLP Treatment Date:   03/15/19  Speech Start Time:  1140  Speech Stop Time:  1159     Speech Total Time (min):  19 min    Billable Minutes: Treatment Swallowing Dysfunction 10 and Seld Care/Home Management Training 9    ALEXANDER Viramontes, CCC-SLP  [phone]  3/15/2019

## 2019-03-16 VITALS
SYSTOLIC BLOOD PRESSURE: 159 MMHG | WEIGHT: 141 LBS | TEMPERATURE: 98 F | HEART RATE: 81 BPM | OXYGEN SATURATION: 100 % | BODY MASS INDEX: 25.95 KG/M2 | RESPIRATION RATE: 18 BRPM | DIASTOLIC BLOOD PRESSURE: 74 MMHG | HEIGHT: 62 IN

## 2019-03-16 LAB
ALBUMIN SERPL BCP-MCNC: 2.2 G/DL
ALP SERPL-CCNC: 535 U/L
ALT SERPL W/O P-5'-P-CCNC: 60 U/L
ANION GAP SERPL CALC-SCNC: 6 MMOL/L
AST SERPL-CCNC: 41 U/L
BASOPHILS # BLD AUTO: 0.02 K/UL
BASOPHILS NFR BLD: 0.2 %
BILIRUB SERPL-MCNC: 2.3 MG/DL
BUN SERPL-MCNC: 10 MG/DL
CALCIUM SERPL-MCNC: 9.5 MG/DL
CHLORIDE SERPL-SCNC: 98 MMOL/L
CO2 SERPL-SCNC: 30 MMOL/L
CREAT SERPL-MCNC: 0.6 MG/DL
DIFFERENTIAL METHOD: ABNORMAL
EOSINOPHIL # BLD AUTO: 0.6 K/UL
EOSINOPHIL NFR BLD: 7 %
ERYTHROCYTE [DISTWIDTH] IN BLOOD BY AUTOMATED COUNT: 17.2 %
EST. GFR  (AFRICAN AMERICAN): >60 ML/MIN/1.73 M^2
EST. GFR  (NON AFRICAN AMERICAN): >60 ML/MIN/1.73 M^2
GLUCOSE SERPL-MCNC: 90 MG/DL
HCT VFR BLD AUTO: 30.4 %
HGB BLD-MCNC: 9.3 G/DL
IMM GRANULOCYTES # BLD AUTO: 0.05 K/UL
IMM GRANULOCYTES NFR BLD AUTO: 0.6 %
LYMPHOCYTES # BLD AUTO: 2.8 K/UL
LYMPHOCYTES NFR BLD: 32.8 %
MAGNESIUM SERPL-MCNC: 1.5 MG/DL
MCH RBC QN AUTO: 31.4 PG
MCHC RBC AUTO-ENTMCNC: 30.6 G/DL
MCV RBC AUTO: 103 FL
MONOCYTES # BLD AUTO: 0.7 K/UL
MONOCYTES NFR BLD: 8.2 %
NEUTROPHILS # BLD AUTO: 4.4 K/UL
NEUTROPHILS NFR BLD: 51.2 %
NRBC BLD-RTO: 0 /100 WBC
PHOSPHATE SERPL-MCNC: 2 MG/DL
PLATELET # BLD AUTO: 237 K/UL
PMV BLD AUTO: 11.4 FL
POCT GLUCOSE: 156 MG/DL (ref 70–110)
POCT GLUCOSE: 67 MG/DL (ref 70–110)
POCT GLUCOSE: 91 MG/DL (ref 70–110)
POTASSIUM SERPL-SCNC: 4.2 MMOL/L
PROT SERPL-MCNC: 5.6 G/DL
RBC # BLD AUTO: 2.96 M/UL
SODIUM SERPL-SCNC: 134 MMOL/L
WBC # BLD AUTO: 8.56 K/UL

## 2019-03-16 PROCEDURE — 63600175 PHARM REV CODE 636 W HCPCS: Performed by: HOSPITALIST

## 2019-03-16 PROCEDURE — 83735 ASSAY OF MAGNESIUM: CPT

## 2019-03-16 PROCEDURE — 25000003 PHARM REV CODE 250: Performed by: PHYSICIAN ASSISTANT

## 2019-03-16 PROCEDURE — 63600175 PHARM REV CODE 636 W HCPCS: Performed by: INTERNAL MEDICINE

## 2019-03-16 PROCEDURE — 94664 DEMO&/EVAL PT USE INHALER: CPT

## 2019-03-16 PROCEDURE — 99238 PR HOSPITAL DISCHARGE DAY,<30 MIN: ICD-10-PCS | Mod: ,,, | Performed by: INTERNAL MEDICINE

## 2019-03-16 PROCEDURE — 36415 COLL VENOUS BLD VENIPUNCTURE: CPT

## 2019-03-16 PROCEDURE — 84100 ASSAY OF PHOSPHORUS: CPT

## 2019-03-16 PROCEDURE — 25000003 PHARM REV CODE 250: Performed by: HOSPITALIST

## 2019-03-16 PROCEDURE — 25000003 PHARM REV CODE 250: Performed by: INTERNAL MEDICINE

## 2019-03-16 PROCEDURE — 85025 COMPLETE CBC W/AUTO DIFF WBC: CPT

## 2019-03-16 PROCEDURE — 63600175 PHARM REV CODE 636 W HCPCS: Performed by: STUDENT IN AN ORGANIZED HEALTH CARE EDUCATION/TRAINING PROGRAM

## 2019-03-16 PROCEDURE — 99238 HOSP IP/OBS DSCHRG MGMT 30/<: CPT | Mod: ,,, | Performed by: INTERNAL MEDICINE

## 2019-03-16 PROCEDURE — 94640 AIRWAY INHALATION TREATMENT: CPT

## 2019-03-16 PROCEDURE — 80053 COMPREHEN METABOLIC PANEL: CPT

## 2019-03-16 PROCEDURE — 25000242 PHARM REV CODE 250 ALT 637 W/ HCPCS: Performed by: HOSPITALIST

## 2019-03-16 PROCEDURE — 99900035 HC TECH TIME PER 15 MIN (STAT)

## 2019-03-16 RX ORDER — HEPARIN SODIUM,PORCINE/PF 10 UNIT/ML
10 SYRINGE (ML) INTRAVENOUS ONCE
Status: COMPLETED | OUTPATIENT
Start: 2019-03-16 | End: 2019-03-16

## 2019-03-16 RX ORDER — THEOPHYLLINE ANHYDROUS 80 MG/15ML
100 SOLUTION ORAL EVERY 12 HOURS
Status: DISCONTINUED | OUTPATIENT
Start: 2019-03-16 | End: 2019-03-16 | Stop reason: HOSPADM

## 2019-03-16 RX ORDER — URSODIOL 250 MG/1
500 TABLET, FILM COATED ORAL 2 TIMES DAILY
Status: DISCONTINUED | OUTPATIENT
Start: 2019-03-16 | End: 2019-03-16 | Stop reason: HOSPADM

## 2019-03-16 RX ORDER — DULOXETIN HYDROCHLORIDE 60 MG/1
60 CAPSULE, DELAYED RELEASE ORAL DAILY
Status: DISCONTINUED | OUTPATIENT
Start: 2019-03-16 | End: 2019-03-16 | Stop reason: HOSPADM

## 2019-03-16 RX ORDER — CLOPIDOGREL BISULFATE 75 MG/1
75 TABLET ORAL DAILY
Status: DISCONTINUED | OUTPATIENT
Start: 2019-03-16 | End: 2019-03-16 | Stop reason: HOSPADM

## 2019-03-16 RX ORDER — PANTOPRAZOLE SODIUM 40 MG/1
40 TABLET, DELAYED RELEASE ORAL DAILY
Status: DISCONTINUED | OUTPATIENT
Start: 2019-03-16 | End: 2019-03-16 | Stop reason: HOSPADM

## 2019-03-16 RX ORDER — NAPROXEN SODIUM 220 MG/1
81 TABLET, FILM COATED ORAL DAILY
Status: DISCONTINUED | OUTPATIENT
Start: 2019-03-16 | End: 2019-03-16 | Stop reason: HOSPADM

## 2019-03-16 RX ADMIN — PANTOPRAZOLE SODIUM 40 MG: 40 TABLET, DELAYED RELEASE ORAL at 09:03

## 2019-03-16 RX ADMIN — ASPIRIN 81 MG CHEWABLE TABLET 81 MG: 81 TABLET CHEWABLE at 08:03

## 2019-03-16 RX ADMIN — THEOPHYLLINE ANHYDROUS 100.27 MG: 80 SOLUTION ORAL at 09:03

## 2019-03-16 RX ADMIN — POTASSIUM CHLORIDE 20 MEQ: 1500 TABLET, EXTENDED RELEASE ORAL at 08:03

## 2019-03-16 RX ADMIN — FLUTICASONE FUROATE AND VILANTEROL TRIFENATATE 1 PUFF: 100; 25 POWDER RESPIRATORY (INHALATION) at 09:03

## 2019-03-16 RX ADMIN — IPRATROPIUM BROMIDE AND ALBUTEROL SULFATE 3 ML: .5; 3 SOLUTION RESPIRATORY (INHALATION) at 07:03

## 2019-03-16 RX ADMIN — LOSARTAN POTASSIUM 100 MG: 50 TABLET, FILM COATED ORAL at 08:03

## 2019-03-16 RX ADMIN — HEPARIN SODIUM 5000 UNITS: 5000 INJECTION, SOLUTION INTRAVENOUS; SUBCUTANEOUS at 06:03

## 2019-03-16 RX ADMIN — TRAMADOL HYDROCHLORIDE 50 MG: 50 TABLET, COATED ORAL at 08:03

## 2019-03-16 RX ADMIN — SODIUM CHLORIDE, PRESERVATIVE FREE 10 UNITS: 5 INJECTION INTRAVENOUS at 02:03

## 2019-03-16 RX ADMIN — SULFAMETHOXAZOLE AND TRIMETHOPRIM 1 TABLET: 800; 160 TABLET ORAL at 08:03

## 2019-03-16 RX ADMIN — LOPERAMIDE HYDROCHLORIDE 2 MG: 1 SOLUTION ORAL at 08:03

## 2019-03-16 RX ADMIN — CLOPIDOGREL 75 MG: 75 TABLET, FILM COATED ORAL at 08:03

## 2019-03-16 RX ADMIN — HYDROXYZINE HYDROCHLORIDE 25 MG: 25 TABLET, FILM COATED ORAL at 08:03

## 2019-03-16 RX ADMIN — URSODIOL 500 MG: 250 TABLET, FILM COATED ORAL at 08:03

## 2019-03-16 RX ADMIN — PREDNISONE 10 MG: 10 TABLET ORAL at 08:03

## 2019-03-16 RX ADMIN — DULOXETINE 60 MG: 60 CAPSULE, DELAYED RELEASE ORAL at 08:03

## 2019-03-16 NOTE — PLAN OF CARE
Per daughter Citlaly, hospital bed has been delivered to pt's home. No other medical equipment is needed.     SW informed [facility] that patient is being discharged today.     12:59 PM  Transportation arranged for wheelchair van via [facility]. Patient has abdominal wounds and decubitus ulcers, requires assistance. Requested pickup time is 2:00 pm. Requested pickup time is not a guarantee of time of arrival.     RN Clarissa guo.     Jasmyne Lei, ISSA  [facility]

## 2019-03-16 NOTE — PLAN OF CARE
Problem: Adult Inpatient Plan of Care  Goal: Plan of Care Review  Outcome: Ongoing (interventions implemented as appropriate)   03/16/19 0541   Plan of Care Review   Plan of Care Reviewed With patient;daughter   AAO x4, Tube feedings stopped : pt may have an regular diet , thin liquids : Peg tube patent , flushed : oral abx : wound to abdomen dressing in place per WOC , reinforced dressing : TQ2: bm x2: Contact Isolation for MRSA : blood glucose monitored 67 , pt refused hypoglycemic protocol , prefers orange juice  X2: BG rechecked 156: Plans to discharge pt home with [facility] With DME set up : safety rounds performed ; VSS, will cont to monitor.

## 2019-03-16 NOTE — NURSING
Pt's IV d/c'd; intact. Pt's port de accessed w/heparin per GILBERT Chin. Pt's daughter states the pt has all her personal belongings with her. Discharge paperwork discussed with the pt's daughter as well as follow up appointments and medications. Pt's dressing to abdomen changed prior to pt being discharged. No complaints voiced by the pt. Pt transported by [facility] [employer] [employer] to her home per w/c.

## 2019-03-18 ENCOUNTER — TELEPHONE (OUTPATIENT)
Dept: ORTHOPEDICS | Facility: CLINIC | Age: 64
End: 2019-03-18

## 2019-03-18 NOTE — PT/OT/SLP DISCHARGE
Physical Therapy Discharge Summary    Name: Sheryl Martines  MRN: [id number]   Principal Problem: Septic shock     Patient Discharged from acute Physical Therapy on 3/16/2019.  Please refer to prior PT noted date on 3/14/2019 for functional status.     Assessment:     Goals partially met.    Objective:     GOALS:   Multidisciplinary Problems     Physical Therapy Goals     Not on file          Multidisciplinary Problems (Resolved)        Problem: Physical Therapy Goal    Goal Priority Disciplines Outcome Goal Variances Interventions   Physical Therapy Goal   (Resolved)     PT, PT/OT Outcome(s) achieved     Description:  Goals to be met by:3/25/19    Patient will increase functional independence with mobility by performin. Supine to sit with Moderate Assistance - met  2. Sit to stand transfer with Maximum Assistance - met       Revised: Sit to stand transfer with Moderate Assistance - not met  3. Bed to chair transfer with Maximum Assistance - met       Revised: Bed to chair transfer with Moderate Assistance - not met  4. Sitting at edge of bed x10 minutes with Contact Guard Assistance - not met  5. Lower extremity exercise program x10 reps, with assistance as needed - not met                             Reasons for Discontinuation of Therapy Services  Transfer to alternate level of care.      Plan:     Patient Discharged to: Home with Home Health Service.    Sudhakar Light, PT  3/18/2019

## 2019-03-18 NOTE — PT/OT/SLP DISCHARGE
Occupational Therapy Discharge Summary    Sheryl Martines  MRN: [id number]   Principal Problem: Septic shock      Patient Discharged from acute Occupational Therapy on 3/16/19.  Please refer to prior OT note dated 3/14/19 for functional status.    Assessment:      Goals partially met.    Objective:     GOALS:   Multidisciplinary Problems     Occupational Therapy Goals     Not on file          Multidisciplinary Problems (Resolved)        Problem: Occupational Therapy Goal    Goal Priority Disciplines Outcome Interventions   Occupational Therapy Goal   (Resolved)     OT, PT/OT Outcome(s) achieved    Description:  Goals to be met by: 3/12/19    Patient will increase functional independence with ADLs by performing:    Feeding with Set-up Assistance.  UE Dressing with Set-up Assistance.  LE Dressing with Moderate Assistance with AD as needed.  Grooming while seated with modified independence.  Toileting from bedside commode with Maximum Assistance for hygiene and clothing management.   Toilet transfer to bedside commode with Maximum Assistance.  Sitting EOB ~10 min with modified independence.                          Reasons for Discontinuation of Therapy Services  Transfer to alternate level of care.      Plan:     Patient Discharged to: Home with Home Health Service    Annamarie Wang OT  3/18/2019

## 2019-03-19 ENCOUNTER — PATIENT MESSAGE (OUTPATIENT)
Dept: INFECTIOUS DISEASES | Facility: CLINIC | Age: 64
End: 2019-03-19

## 2019-03-19 ENCOUNTER — TELEPHONE (OUTPATIENT)
Dept: ORTHOPEDICS | Facility: CLINIC | Age: 64
End: 2019-03-19

## 2019-03-19 NOTE — TELEPHONE ENCOUNTER
Called and Informed patient care giver of appointment on 3/21/19 at 2:30 pm. Patient care giver states verbal understanding and has no further questions.

## 2019-03-20 ENCOUNTER — TELEPHONE (OUTPATIENT)
Dept: INFECTIOUS DISEASES | Facility: CLINIC | Age: 64
End: 2019-03-20

## 2019-03-21 ENCOUNTER — TELEPHONE (OUTPATIENT)
Dept: INFECTIOUS DISEASES | Facility: CLINIC | Age: 64
End: 2019-03-21

## 2019-03-21 ENCOUNTER — TELEPHONE (OUTPATIENT)
Dept: INFECTIOUS DISEASES | Facility: HOSPITAL | Age: 64
End: 2019-03-21

## 2019-03-21 NOTE — TELEPHONE ENCOUNTER
Called patient's daughter.  Calcium is high. Reviewed labs  Last calcium 4 days ago was wnl (9.5).  No increased levels throughout hospitalization  Per daughter,  patient is doing better than Wednesday.  Decreased sleepiness, lethargy, decreased confusion.  Does have decreased appetite.  Is taking fluids without problem. No major complaints today. They had stopped Tramadol and she improved.  She reports she also has UTI.   Reviewed med list in discharge summary - she is on theophylline ( no lithium or thiazide diurectics)  Will repeat CMP, get ionized calcium and check theophylline level and run stat.   Called Home health.  Home Health Nurse going out this morning.   Discussed with ID staff.

## 2019-03-21 NOTE — TELEPHONE ENCOUNTER
Infectious Disease:  Lab called critical value of repeat calcium 12.2 (12.7 previously).  Ionized Ca 1.63  .    She was recently discharged from hospital after long admission with sepsis 2/2 MRSA bacteremia s/p ex-lap, pelvic abscess drainage, suspected necrotizing PNA.    I have not previously seen this patient (she is scheduled with me for hospital follow up of PNA). Because of follow up scheduled with me, her daughter had communicated with me earlier in the week via email over concerns regarding increased confusion, lethargy, tremors, weakness which she was attributing to Bactrim.  Ordered labs yesterday and calcium 12.7,  repeat 12.2.  Calcium was 9.5 just 4 days prior.  Unclear as to sudden increase in level.  Review of meds indicates she is on theophylline (toxicity associated with hypercalcemia) - ordered level which is pending.     Spoke to both the patient and her daughter.  Daughter  advised that patient does not have regular PCP/Internist. In abundance of caution, given the level is considered critical value,  I advised to go the ED for EKG possibly  fluids, further assessment to ensure no potentially life threatening issue.  Advised of relative wait times at [facility] campuses. They live in [place]. Wait time currently 9 minutes at [facility].      Discussed with ID staff.

## 2019-03-22 ENCOUNTER — TELEPHONE (OUTPATIENT)
Dept: INFECTIOUS DISEASES | Facility: HOSPITAL | Age: 64
End: 2019-03-22

## 2019-03-22 NOTE — TELEPHONE ENCOUNTER
ID Note:  Follow up.  Patient did not go to ED.  Contacted Home Health who connected me to [facility] nurse practitioner. Discussed with her. Contacted Dr. Wang who is her collaborative MD.  Plan is for fluids at home and they will monitor calcium levels.

## 2019-03-28 ENCOUNTER — LAB VISIT (OUTPATIENT)
Dept: LAB | Facility: HOSPITAL | Age: 64
End: 2019-03-28
Attending: INTERNAL MEDICINE
Payer: MEDICARE

## 2019-03-28 DIAGNOSIS — I13.10 MALIGNANT HYPERTENSIVE HEART AND RENAL DISEASE, WITH RENAL FAILURE: Primary | ICD-10-CM

## 2019-03-28 LAB
ANION GAP SERPL CALC-SCNC: 8 MMOL/L (ref 8–16)
BUN SERPL-MCNC: 10 MG/DL (ref 8–23)
CALCIUM SERPL-MCNC: 9.4 MG/DL (ref 8.7–10.5)
CHLORIDE SERPL-SCNC: 101 MMOL/L (ref 95–110)
CO2 SERPL-SCNC: 22 MMOL/L (ref 23–29)
CREAT SERPL-MCNC: 0.8 MG/DL (ref 0.5–1.4)
EST. GFR  (AFRICAN AMERICAN): >60 ML/MIN/1.73 M^2
EST. GFR  (NON AFRICAN AMERICAN): >60 ML/MIN/1.73 M^2
GLUCOSE SERPL-MCNC: 90 MG/DL (ref 70–110)
POTASSIUM SERPL-SCNC: 3.9 MMOL/L (ref 3.5–5.1)
SODIUM SERPL-SCNC: 131 MMOL/L (ref 136–145)

## 2019-03-28 PROCEDURE — 80048 BASIC METABOLIC PNL TOTAL CA: CPT

## 2019-03-29 LAB
ACID FAST MOD KINY STN SPEC: NORMAL
ACID FAST MOD KINY STN SPEC: NORMAL
MYCOBACTERIUM SPEC QL CULT: NORMAL
MYCOBACTERIUM SPEC QL CULT: NORMAL

## 2019-04-03 ENCOUNTER — OFFICE VISIT (OUTPATIENT)
Dept: SURGERY | Facility: CLINIC | Age: 64
End: 2019-04-03
Payer: MEDICARE

## 2019-04-03 VITALS
SYSTOLIC BLOOD PRESSURE: 181 MMHG | WEIGHT: 141 LBS | DIASTOLIC BLOOD PRESSURE: 89 MMHG | BODY MASS INDEX: 25.95 KG/M2 | HEIGHT: 62 IN | HEART RATE: 73 BPM | TEMPERATURE: 98 F

## 2019-04-03 DIAGNOSIS — Z98.890 S/P EXPLORATORY LAPAROTOMY: Primary | ICD-10-CM

## 2019-04-03 PROCEDURE — 99999 PR PBB SHADOW E&M-EST. PATIENT-LVL III: ICD-10-PCS | Mod: PBBFAC,,, | Performed by: SURGERY

## 2019-04-03 PROCEDURE — 99999 PR PBB SHADOW E&M-EST. PATIENT-LVL III: CPT | Mod: PBBFAC,,, | Performed by: SURGERY

## 2019-04-03 PROCEDURE — 99024 POSTOP FOLLOW-UP VISIT: CPT | Mod: S$GLB,,, | Performed by: SURGERY

## 2019-04-03 PROCEDURE — 99024 PR POST-OP FOLLOW-UP VISIT: ICD-10-PCS | Mod: S$GLB,,, | Performed by: SURGERY

## 2019-04-03 RX ORDER — SULFAMETHOXAZOLE AND TRIMETHOPRIM 200; 40 MG/5ML; MG/5ML
SUSPENSION ORAL
Refills: 0 | COMMUNITY
Start: 2019-03-20 | End: 2019-04-06 | Stop reason: ALTCHOICE

## 2019-04-03 RX ORDER — CLINDAMYCIN HYDROCHLORIDE 300 MG/1
CAPSULE ORAL
Refills: 0 | COMMUNITY
Start: 2019-03-20 | End: 2019-04-06

## 2019-04-03 NOTE — PROGRESS NOTES
HPI:    The patient is status post-Ex lap and drainage of abdominal abscess after an open Appendectomy in an OSH back on 1/25.     She had a long post op course in the hospital but she is now at home living with her daughter and doing well, she is tolerating a regular diet with no nausea or emesis. She is not using her PEG tube for medicine nor food.   She still has a midline wound which is being packed about every other day.       Physical Exam     General: In no acute distress, well appearance. On a wheelchair  Pulm: non labored breathing  Abd: soft, non distended, non tender. Inferior midline with two small open wound with healthy granulation tissue. RLQ old appendectomy site also with a small open wound which was packed as well. Small area of necrotic tissue removed from umbilical site.   : No nieves in place.   Ext: wwp      ASSESSMENT/PLAN:      The patient is doing well after surgery.       Follow up PRN.  Continue to pack wound until healed.         Shaila Diaz MD  General Surgery PGY V  Beeper: [phone]

## 2019-04-03 NOTE — PROGRESS NOTES
Doing well,  Tolerating diet,  No nausea or vomiting    Abdomen soft and non-tender    Wounds closing and clean    Re packed    G tube removed      Will follow up prn

## 2019-04-05 ENCOUNTER — OFFICE VISIT (OUTPATIENT)
Dept: INFECTIOUS DISEASES | Facility: CLINIC | Age: 64
End: 2019-04-05
Payer: MEDICARE

## 2019-04-05 VITALS
SYSTOLIC BLOOD PRESSURE: 146 MMHG | HEART RATE: 72 BPM | DIASTOLIC BLOOD PRESSURE: 80 MMHG | BODY MASS INDEX: 26.62 KG/M2 | TEMPERATURE: 98 F | WEIGHT: 141 LBS | HEIGHT: 61 IN

## 2019-04-05 DIAGNOSIS — R53.81 DEBILITY: ICD-10-CM

## 2019-04-05 DIAGNOSIS — J18.9 PNEUMONIA OF RIGHT LOWER LOBE DUE TO INFECTIOUS ORGANISM: Primary | ICD-10-CM

## 2019-04-05 PROCEDURE — 99213 PR OFFICE/OUTPT VISIT, EST, LEVL III, 20-29 MIN: ICD-10-PCS | Mod: S$GLB,,, | Performed by: NURSE PRACTITIONER

## 2019-04-05 PROCEDURE — 99999 PR PBB SHADOW E&M-EST. PATIENT-LVL V: ICD-10-PCS | Mod: PBBFAC,,, | Performed by: NURSE PRACTITIONER

## 2019-04-05 PROCEDURE — 99999 PR PBB SHADOW E&M-EST. PATIENT-LVL V: CPT | Mod: PBBFAC,,, | Performed by: NURSE PRACTITIONER

## 2019-04-05 PROCEDURE — 3079F DIAST BP 80-89 MM HG: CPT | Mod: S$GLB,,, | Performed by: NURSE PRACTITIONER

## 2019-04-05 PROCEDURE — 3079F PR MOST RECENT DIASTOLIC BLOOD PRESSURE 80-89 MM HG: ICD-10-PCS | Mod: S$GLB,,, | Performed by: NURSE PRACTITIONER

## 2019-04-05 PROCEDURE — 99213 OFFICE O/P EST LOW 20 MIN: CPT | Mod: S$GLB,,, | Performed by: NURSE PRACTITIONER

## 2019-04-05 PROCEDURE — 3008F BODY MASS INDEX DOCD: CPT | Mod: S$GLB,,, | Performed by: NURSE PRACTITIONER

## 2019-04-05 PROCEDURE — 3077F PR MOST RECENT SYSTOLIC BLOOD PRESSURE >= 140 MM HG: ICD-10-PCS | Mod: S$GLB,,, | Performed by: NURSE PRACTITIONER

## 2019-04-05 PROCEDURE — 3008F PR BODY MASS INDEX (BMI) DOCUMENTED: ICD-10-PCS | Mod: S$GLB,,, | Performed by: NURSE PRACTITIONER

## 2019-04-05 PROCEDURE — 3077F SYST BP >= 140 MM HG: CPT | Mod: S$GLB,,, | Performed by: NURSE PRACTITIONER

## 2019-04-05 NOTE — PROGRESS NOTES
Subjective:      Patient ID: Sheryl Martines is a 63 y.o. female.    Chief Complaint:Follow-up      History of Present Illness    Ms. Martines is a 63 year old with asthma, COPD, DM, HTN, CVA 2012 with right sided deficits, HFpEF, severe debility, who underwent an open appendectomy in August 2018, complicated by C diff, failure to thrive and surgical site infection with recurrent fluid collections.  Cultures + for MRSA, Enterococcus, E Coli and anaerobes. She had a prolonged hospitalization and was treated with extended antibiotics through 10/1/18.      On   1/24 a CT showed a fluid collection in the right hemipelvis.    She underwent exploratory laparotomy, washout, and pelvic abscess drainage on 1/25/19.  Gross purulence was found and her blood cultures were + for MRSA   She was treated with 2 weeks of IV vancomycin for MRSA bacteremia, then approx 5 weeks of cipro/flagyl.      On 3/8 she developed abdominal pain, fever, and leukocytosis.  ID workup initiated at a CT revealed possible necrotizing PNA.  She was started on empiric vancomycin and zosyn, then vanc/meropenem   Respiratory cultures + for MRSA and acinetobacter.  Her leukocytosis resolved and she was started on bactrim orally on 3/15/19 with plan for 14 days of antibiotics for necrotizing PNA.      Seen by General Surgery on 4/3/19.  Reported to be doing well.  Wounds closing and clean.  G tube removed.  To follow up with them prn only     She is here today for follow up and is accompanied by her daughter.   Finished antibiotics on 3/29.  Denies fevers, chills, sweats.  Denies cough, SOB or sputum production.  Reports that overall she feels better, making progress.  Occasional n/v, but eating and appetite has improved.  Complains of bilateral parasthesias of legs.  Taking gabapentin.  Abdominal wounds improving - per General surgery note wounds are small with healthy granulation tissue.  Currently packed.   Home health caring for wounds.       Review of  Systems   Constitution: Negative for chills, decreased appetite, fever, malaise/fatigue, night sweats, weight gain and weight loss.   HENT: Negative for congestion, ear pain, hearing loss, hoarse voice, sore throat and tinnitus.    Eyes: Negative for blurred vision, redness and visual disturbance.   Cardiovascular: Negative for chest pain, leg swelling and palpitations.   Respiratory: Negative for cough, hemoptysis, shortness of breath, sputum production and wheezing.    Hematologic/Lymphatic: Negative for adenopathy. Does not bruise/bleed easily.   Skin: Positive for dry skin and itching. Negative for rash and suspicious lesions.   Musculoskeletal: Positive for myalgias and neck pain. Negative for back pain and joint pain.   Gastrointestinal: Negative for abdominal pain, constipation, diarrhea, heartburn, nausea and vomiting.   Genitourinary: Negative for dysuria, flank pain, frequency, hematuria, hesitancy and urgency.   Neurological: Positive for paresthesias and weakness. Negative for dizziness, headaches and numbness.   Psychiatric/Behavioral: Positive for memory loss. Negative for depression. The patient does not have insomnia and is not nervous/anxious.    Allergic/Immunologic: Negative for environmental allergies, HIV exposure, hives and persistent infections.     Objective:   Physical Exam   Constitutional: She is oriented to person, place, and time. She appears well-developed. No distress.   Frail woman presents in wheelchair in NAD, appears older than stated age   HENT:   Head: Normocephalic and atraumatic.   Eyes: Conjunctivae are normal. No scleral icterus.   Cardiovascular: Normal rate and regular rhythm.   Pulmonary/Chest: Effort normal and breath sounds normal. No respiratory distress. She has no wheezes. She has no rales.   Abdominal: Soft. She exhibits no distension. There is no tenderness. There is no guarding.   Two lower abdominal wound dressings c/d/i.    Upper abd dressing at g-tube site -  c/d/i    Musculoskeletal: She exhibits no edema.   Neurological: She is alert and oriented to person, place, and time.   Skin: Skin is warm and dry. She is not diaphoretic.   Psychiatric: She has a normal mood and affect.   Vitals reviewed.    Assessment:       1. Pneumonia of right lower lobe due to infectious organism    2. Debility          63 year old woman with asthma, COPD, DM, HTN, CVA 2012 with right sided deficits, HFpEF, severe debility, s/p open appendectomy in August 2018, complicated by C diff, failure to thrive and surgical site infection with recurrent fluid collections (cultures + for MRSA, Enterococcus, E Coli and anaerobes) and prolonged hospitalizations, most recently from 1/23 to 3/15.  ID consulted on 3/8 for abdominal pain, fever, and leukocytosis.  ID workup initiated at a CT revealed possible necrotizing PNA.  She was started on empiric vancomycin and zosyn, then switched to vanc/meropenem.   Respiratory cultures + for MRSA and acinetobacter.  Her leukocytosis resolved and she was transitioned to on bactrim orally on 3/15/19 at discharge with plan for 14 days of antibiotics for necrotizing PNA.  She completed antibiotics on 3/29 for total of 21 days of antibiotic therapy.  Has been off antibiotics for a week.  No fevers, chills, systemic signs of infection. Denies any cough, SOB (above baseline - has asthma/copd)   Seen by General Surgery on 4/3/19.  Reported to be doing well.  Wounds closing and clean.  G tube removed.  To follow up with them prn only        Plan:     1.  No further antibiotics at this time.   2. Will discuss timing /need for repeat imaging with ID staff.   3.  Needs follow up with PCP for management of other chronic issues. Discussed with patient's daughter.  PCP is in [place], but unknown when patient can return.  Offered to set up  here at [facility].  They have been followed by NP at home visits since discharge, but if patient to remain here, need to establish with regular  provider.  Will send message to Jackrabbit MARIO

## 2019-04-05 NOTE — PATIENT INSTRUCTIONS
1.  No further antibiotics.   2.  I will contact you regarding any further imaging.  No CXR at this time unless you feel you are worsening.     3.  Follow up with Infectious Disease as needed.

## 2019-04-06 PROBLEM — J18.9 RIGHT LOWER LOBE PNEUMONIA: Status: RESOLVED | Noted: 2019-03-09 | Resolved: 2019-04-06

## 2019-04-06 PROBLEM — J18.9 PNEUMONIA: Status: RESOLVED | Noted: 2019-03-14 | Resolved: 2019-04-06

## 2019-04-11 ENCOUNTER — TELEPHONE (OUTPATIENT)
Dept: INFECTIOUS DISEASES | Facility: HOSPITAL | Age: 64
End: 2019-04-11

## 2019-04-11 NOTE — TELEPHONE ENCOUNTER
Spoke to JUAN DAVID Grant NP who has been following patient within 30 day hospital discharge period.  Discussed need to set up Primary Care if patient remaining in [place] for next few months.  She will visit patient this week and make recommendations for Primary Care follow up.     Reviewed case and need for repeat imaging with ID Staff, Dr. Perez, who followed patient in hospital.  G tube removed, General Surgery follow up only prn, patient completed antibiotics.  No indication for repeat imaging at this time.

## 2019-04-16 RX ORDER — PREDNISONE 10 MG/1
TABLET ORAL
Qty: 30 TABLET | Refills: 0 | OUTPATIENT
Start: 2019-04-16

## 2019-05-16 ENCOUNTER — HOSPITAL ENCOUNTER (OUTPATIENT)
Dept: RADIOLOGY | Facility: HOSPITAL | Age: 64
Discharge: HOME | End: 2019-05-16
Attending: PHYSICIAN ASSISTANT
Payer: MEDICARE

## 2019-05-16 ENCOUNTER — OFFICE VISIT (OUTPATIENT)
Dept: ORTHOPEDICS | Facility: CLINIC | Age: 64
End: 2019-05-16
Payer: MEDICARE

## 2019-05-16 DIAGNOSIS — S82.401D CLOSED FRACTURE OF RIGHT TIBIA AND FIBULA WITH ROUTINE HEALING, SUBSEQUENT ENCOUNTER: Primary | ICD-10-CM

## 2019-05-16 DIAGNOSIS — S82.201D CLOSED FRACTURE OF RIGHT TIBIA AND FIBULA WITH ROUTINE HEALING, SUBSEQUENT ENCOUNTER: ICD-10-CM

## 2019-05-16 DIAGNOSIS — S82.401D CLOSED FRACTURE OF RIGHT TIBIA AND FIBULA WITH ROUTINE HEALING, SUBSEQUENT ENCOUNTER: ICD-10-CM

## 2019-05-16 DIAGNOSIS — S82.201D CLOSED FRACTURE OF RIGHT TIBIA AND FIBULA WITH ROUTINE HEALING, SUBSEQUENT ENCOUNTER: Primary | ICD-10-CM

## 2019-05-16 PROCEDURE — 73590 X-RAY EXAM OF LOWER LEG: CPT | Mod: 26,RT,, | Performed by: RADIOLOGY

## 2019-05-16 PROCEDURE — 73590 XR TIBIA FIBULA 2 VIEW RIGHT: ICD-10-PCS | Mod: 26,RT,, | Performed by: RADIOLOGY

## 2019-05-16 PROCEDURE — 73560 X-RAY EXAM OF KNEE 1 OR 2: CPT | Mod: TC,RT

## 2019-05-16 PROCEDURE — 99999 PR PBB SHADOW E&M-EST. PATIENT-LVL V: CPT | Mod: PBBFAC,,, | Performed by: PHYSICIAN ASSISTANT

## 2019-05-16 PROCEDURE — 99213 PR OFFICE/OUTPT VISIT, EST, LEVL III, 20-29 MIN: ICD-10-PCS | Mod: S$GLB,,, | Performed by: PHYSICIAN ASSISTANT

## 2019-05-16 PROCEDURE — 73560 X-RAY EXAM OF KNEE 1 OR 2: CPT | Mod: 26,RT,, | Performed by: RADIOLOGY

## 2019-05-16 PROCEDURE — 99213 OFFICE O/P EST LOW 20 MIN: CPT | Mod: S$GLB,,, | Performed by: PHYSICIAN ASSISTANT

## 2019-05-16 PROCEDURE — 73560 XR KNEE 1 OR 2 VIEW RIGHT: ICD-10-PCS | Mod: 26,RT,, | Performed by: RADIOLOGY

## 2019-05-16 PROCEDURE — 73590 X-RAY EXAM OF LOWER LEG: CPT | Mod: TC,RT

## 2019-05-16 PROCEDURE — 99999 PR PBB SHADOW E&M-EST. PATIENT-LVL V: ICD-10-PCS | Mod: PBBFAC,,, | Performed by: PHYSICIAN ASSISTANT

## 2019-05-17 NOTE — PROGRESS NOTES
Subjective:      Patient ID: Sheryl Martines is a 63 y.o. female.    Chief Complaint: Follow-up (right knee/tibia)    HPI    Patient is a 63 y.o. female  with history of DM, osteoporosis and pontine stroke 2012 presents to clinic for follow up for right Non displaced oblique right tibia fracture at metaphysis into diaphysis secondary to falling form a standing height on 10/06/2018 after her knees gave out.  Due to patients comorbid medical conditions and at present her blood sugar is running very high she decided to be treated non-operative. She has been in a knee brace. She stated that she was doing ok. She has increased her weight slowly but noticed an increase in pain over the past 3 days. Pain is controlled. She is using a wheelchair to assist with ambulation.      Patient ambulates at baseline with Rollator.   On aspirin and plavix    Review of Systems   Constitution: Negative for chills and fever.   Cardiovascular: Negative for chest pain.   Respiratory: Negative for cough and shortness of breath.    Skin: Negative for color change, dry skin, itching, nail changes, poor wound healing and rash.   Musculoskeletal:        Right leg fracture.    Neurological: Negative for dizziness.   Psychiatric/Behavioral: Negative for altered mental status. The patient is not nervous/anxious.    All other systems reviewed and are negative.        Objective:      General    Constitutional: She is oriented to person, place, and time. She appears well-developed and well-nourished. No distress.   HENT:   Head: Atraumatic.   Eyes: Conjunctivae are normal.   Cardiovascular: Normal rate.    Pulmonary/Chest: Effort normal.   Neurological: She is alert and oriented to person, place, and time.   Psychiatric: She has a normal mood and affect. Her behavior is normal.           Right Knee Exam     Comments:  Presents to clinic with her daughter in wheelchair, Very mild TTP to fracture site.   range of motion knee is 0-100        RADS:Fractures of the proximal tibia and distal fibula appear to be in good position and alignment with healing.  Assessment:       Encounter Diagnosis   Name Primary?    Closed fracture of right tibia and fibula with routine healing, subsequent encounter Yes          Plan:       Discussed plan with patient and family, Continue non-operative treatment  - hinge knee brace for comfort, may begin to wean  -slowly advance weightbearing, explained to not cause any pain  - pain medication: no refill needed  - discussed proper nutrition  - return to clinic in 8 weeks at time x-ray of her tib fib is needed. Sooner is any problems or concerns

## 2019-05-29 ENCOUNTER — OFFICE VISIT (OUTPATIENT)
Dept: SURGERY | Facility: CLINIC | Age: 64
End: 2019-05-29
Payer: MEDICARE

## 2019-05-29 ENCOUNTER — TELEPHONE (OUTPATIENT)
Dept: SURGERY | Facility: CLINIC | Age: 64
End: 2019-05-29

## 2019-05-29 VITALS
BODY MASS INDEX: 23.3 KG/M2 | TEMPERATURE: 98 F | HEIGHT: 61 IN | HEART RATE: 94 BPM | DIASTOLIC BLOOD PRESSURE: 91 MMHG | SYSTOLIC BLOOD PRESSURE: 193 MMHG | WEIGHT: 123.44 LBS

## 2019-05-29 DIAGNOSIS — Z98.890 S/P EXPLORATORY LAPAROTOMY: Primary | ICD-10-CM

## 2019-05-29 PROCEDURE — 99999 PR PBB SHADOW E&M-EST. PATIENT-LVL IV: ICD-10-PCS | Mod: PBBFAC,,, | Performed by: SURGERY

## 2019-05-29 PROCEDURE — 3077F PR MOST RECENT SYSTOLIC BLOOD PRESSURE >= 140 MM HG: ICD-10-PCS | Mod: S$GLB,,, | Performed by: SURGERY

## 2019-05-29 PROCEDURE — 99213 OFFICE O/P EST LOW 20 MIN: CPT | Mod: S$GLB,,, | Performed by: SURGERY

## 2019-05-29 PROCEDURE — 3077F SYST BP >= 140 MM HG: CPT | Mod: S$GLB,,, | Performed by: SURGERY

## 2019-05-29 PROCEDURE — 99999 PR PBB SHADOW E&M-EST. PATIENT-LVL IV: CPT | Mod: PBBFAC,,, | Performed by: SURGERY

## 2019-05-29 PROCEDURE — 3008F BODY MASS INDEX DOCD: CPT | Mod: S$GLB,,, | Performed by: SURGERY

## 2019-05-29 PROCEDURE — 99213 PR OFFICE/OUTPT VISIT, EST, LEVL III, 20-29 MIN: ICD-10-PCS | Mod: S$GLB,,, | Performed by: SURGERY

## 2019-05-29 PROCEDURE — 3080F PR MOST RECENT DIASTOLIC BLOOD PRESSURE >= 90 MM HG: ICD-10-PCS | Mod: S$GLB,,, | Performed by: SURGERY

## 2019-05-29 PROCEDURE — 3080F DIAST BP >= 90 MM HG: CPT | Mod: S$GLB,,, | Performed by: SURGERY

## 2019-05-29 PROCEDURE — 3008F PR BODY MASS INDEX (BMI) DOCUMENTED: ICD-10-PCS | Mod: S$GLB,,, | Performed by: SURGERY

## 2019-05-29 NOTE — PROGRESS NOTES
[facility] - General Surgery    HOME HEALTH ORDERS  FACE TO FACE ENCOUNTER    Patient Name: Sheryl Martines  YOB: 1955    PCP: Primary Doctor No   PCP Address: None  PCP Phone Number: None  PCP Fax: None    Encounter Date: 05/29/2019    Admit to Home Health    Diagnoses:  There are no hospital problems to display for this patient.      Future Appointments   Date Time Provider Department Center   6/4/2019 11:00 AM KETURAH Lozano [facility] EN[facility]   8/20/2019 10:45 AM Sarah Lujan PA-C [facility] OR[facility]           I have seen and examined this patient face to face today. My clinical findings that support the need for the home health skilled services and home bound status are the following:  Medical restrictions requiring assistance of another human to leave home due to  Wound care needs.    Allergies:  Review of patient's allergies indicates:   Allergen Reactions    Ace inhibitors Swelling    Carvedilol      Other reaction(s): Other (See Comments)  blister    Ciprofloxacin      Elevated liver enzymes    Hydralazine analogues     Metformin Nausea And Vomiting    Tetracycline Itching    Tetracyclines Swelling    Travatan (with benzalkonium) [travoprost (benzalkonium)]     Travoprost Itching     Other reaction(s): Other (See Comments)  Blurry vision       Diet: regular diet    Activities: activity as tolerated    Nursing:   SN to complete comprehensive assessment including routine vital signs. Instruct on disease process and s/s of complications to report to MD. Review/verify medication list sent home with the patient at time of discharge  and instruct patient/caregiver as needed. Frequency may be adjusted depending on start of care date.    Notify MD if SBP > 160 or < 90; DBP > 90 or < 50; HR > 120 or < 50; Temp > 101      CONSULTS:    Physical Therapy to evaluate and treat. Evaluate for home safety and equipment needs; Establish/upgrade home exercise program. Perform / instruct on  therapeutic exercises, gait training, transfer training, and Range of Motion.  Occupational Therapy to evaluate and treat. Evaluate home environment for safety and equipment needs. Perform/Instruct on transfers, ADL training, ROM, and therapeutic exercises.  Aide to provide assistance with personal care, ADLs, and vital signs.    WOUND CARE ORDERS  yes:  Surgical Wound:  Location: abdomen    Consult ET nurse        Apply the following to wound:   Packing wound with packing tape   Cover with gauze and tegaderm   Change daily   Okay to shower      Medications: Review discharge medications with patient and family and provide education.      Cannot display discharge medications since this is not an admission.      I certify that this patient is confined to her home and needs intermittent skilled nursing care, physical therapy and occupational therapy.

## 2019-05-29 NOTE — PROGRESS NOTES
"HPI:    The patient is status post-Ex lap and drainage of abdominal abscess after an open Appendectomy in an OSH on 1/25.     She had a long post op course in the hospital but she is now at home living with her daughter and doing well, she is tolerating a regular diet with no nausea or emesis. She is having normal bowel movements. She presents today for evaluation of draining wound on midline abdomen, daughter states she has two wounds which drain thin serous fluid, no blood or stool. (HH suggested possible ECF). She denies fevers, chills.  has not been changing her dressing as they say "she has no orders".      Physical Exam     General: In no acute distress, well appearance. in a wheelchair  Pulm: non labored breathing  Abd: soft, non distended, non tender. Inferior midline with one small open wound with serous drainage, other site indicated by daughter is closed up and not apparently draining   Ext: wwp      ASSESSMENT/PLAN:  62 yo female s/p ex lap, with new onset drainage  -packed open wound with packing tape  -educated patient and family on packing changes  -family requesting wound care consult and updated home health orders              "

## 2019-05-31 NOTE — PROGRESS NOTES
"[facility]  General Surgery  Progress Note    Subjective:     History of Present Illness:  64 yo W with a history of HTN, COPD on home oxygen, HFpEF, IDDMII, CVA on plavix, HTN, debility after fall and broken hip, and PE who presents to the ED with a several month history of nausea, vomiting, inability to tolerate a diet and weight loss. She has had multiple admissions in the past few months for different ailments. She presents today with continued nausea, vomiting and abdominal pain that is mostly worse in the RLQ. During the examination, she states her pain was all over her abdomen because she was retching so much. She states that she has lost close to 40lbs since her surgery and that she only able to keep broth down. She had a lap converted to open appendectomy back in August 2018 that was complicated by a wound infection, C diff and failure to thrive. This seems to persists. She is now wheelchair bound (per her) and apparently lives in [place] but is here in [place] with her daughter.    She had a CT scan in the ED which revealed an irregular shaped rim enhancing fluid collection measuring at least 4.0 x 3.0 cm ight hemipelvis at the site of prior appendectomy. Surgery was consulted for further recommendations. She was also noted to have a "knot" at the RLQ incision site that is also tender.    Post-Op Info:  Procedure(s) (LRB):  LAPAROTOMY, EXPLORATORY (N/A)  INCISION AND DRAINAGE, ABSCESS-  drainage of pelvic abscess (N/A)  EXCISION, ABSCESS- drainage abdominal wall abscess (N/A)  APPLICATION, WOUND VAC (N/A)   22 Days Post-Op     Interval History: Refused dose of lasix overnight. Tube feeds remain at 30cc/hr. No IV narcotics given overnight.    Medications:  Continuous Infusions:  Scheduled Meds:   aspirin  81 mg Oral Daily    ciprofloxacin HCl  500 mg Oral Q12H    clopidogrel  75 mg Oral Daily    diltiaZEM  180 mg Oral Daily    DULoxetine  60 mg Oral Daily    " fluticasone-vilanterol  1 puff Inhalation Daily    furosemide  40 mg Oral BID    glucagon (human recombinant)  1 mg Intramuscular Once    metroNIDAZOLE  500 mg Oral Q8H    miconazole nitrate 2%   Topical (Top) BID    pantoprozole (PROTONIX) IV  40 mg Intravenous Q12H    senna-docusate 8.6-50 mg  1 tablet Per G Tube BID    sodium chloride 0.9%  10 mL Intravenous Q6H     PRN Meds:albuterol sulfate, albuterol-ipratropium, dextrose 50%, diclofenac sodium, diphenhydrAMINE, glucagon (human recombinant), hydrALAZINE, hydrocodone-apap 7.5-325 MG/15 ML, hydrocodone-apap 7.5-325 MG/15 ML, insulin aspart U-100, ondansetron, promethazine (PHENERGAN) IVPB, Flushing PICC Protocol **AND** sodium chloride 0.9% **AND** sodium chloride 0.9%, sodium chloride 0.9%, sodium chloride 0.9%     Review of patient's allergies indicates:   Allergen Reactions    Ace inhibitors Swelling    Carvedilol      Other reaction(s): Other (See Comments)  blister    Hydralazine analogues     Metformin Nausea And Vomiting    Tetracycline Itching    Tetracyclines Swelling    Travatan (with benzalkonium) [travoprost (benzalkonium)]     Travoprost Itching     Other reaction(s): Other (See Comments)  Blurry vision     Objective:     Vital Signs (Most Recent):  Temp: 98.2 °F (36.8 °C) (02/16/19 1252)  Pulse: 91 (02/16/19 1252)  Resp: (!) 22 (02/16/19 1252)  BP: (!) 107/54 (02/16/19 1252)  SpO2: 95 % (02/16/19 1252) Vital Signs (24h Range):  Temp:  [98.2 °F (36.8 °C)-99.1 °F (37.3 °C)] 98.2 °F (36.8 °C)  Pulse:  [85-95] 91  Resp:  [18-22] 22  SpO2:  [90 %-100 %] 95 %  BP: ()/(36-66) 107/54     Weight: 75.4 kg (166 lb 3.6 oz)  Body mass index is 31.41 kg/m².    Intake/Output - Last 3 Shifts       02/14 0700 - 02/15 0659 02/15 0700 - 02/16 0659 02/16 0700 - 02/17 0659    NG/GT 1000 1049     Total Intake(mL/kg) 1000 (13.3) 1049 (13.9)     Urine (mL/kg/hr) 1550 (0.9) 100 (0.1)     Emesis/NG output       Drains  40     Stool  0     Total Output  1550 140     Net -550 +909            Urine Occurrence  3 x     Stool Occurrence  0 x           Physical Exam   Constitutional: She appears well-developed.   Neck: Normal range of motion. Neck supple.   Cardiovascular: Normal rate and regular rhythm.   Pulmonary/Chest: Effort normal and breath sounds normal.   Abdominal: Soft. She exhibits no distension. There is no tenderness.       Musculoskeletal: She exhibits no edema.   Neurological: She is alert.   Skin: Skin is warm and dry.   Breakdown noted on backside and anterior abdominal wall       Significant Labs:  CBC:   Recent Labs   Lab 02/15/19  0408   WBC 8.80   RBC 3.16*   HGB 9.1*   HCT 29.8*   PLT SEE COMMENT   MCV 94   MCH 28.8   MCHC 30.5*     CMP:   Recent Labs   Lab 02/16/19  0356   *   CALCIUM 9.6   ALBUMIN 1.4*   PROT 5.4*      K 3.8   CO2 34*      BUN 12   CREATININE 0.6   ALKPHOS 1,664*   ALT 30   *   BILITOT 3.7*       Significant Diagnostics:  None    Assessment/Plan:     * Pelvic abscess in female    -S/p ex-lap with drainage of abscess. On antibiotics.  -Vanc completed.   -On antibiotics; date of completion estimated 2/26/19     Elevated liver enzymes    -AST uptrending  -Tbili 3.1  -Alk phos 1600 today  -CT showed biliary sludge; may repeat US     Acute blood loss anemia    -H/H stable  -Last transfused 2/11/19     Dysarthria and anarthria    -PEG placed 2/11/19     Multiple skin tears    -Wound care following     Depression    -Continue Cymbalta     Hypophosphatemia    -Replace as needed     Debility    -PT/OT; history of fall with non displaced oblique right tibia fracture at metaphysis into diaphysis. Was wearing brace.  -Severely debilited; needs placement. SW consulted and following       Generalized abdominal pain    62 yo female presenting with abdominal pain, nausea, elevated lactate s/p open drainage pelvic abscess 1/25     -NPO  -Continue tube feeds  -Changed pain meds to enteral  -Increase TFs to goal  45cc/hr  -Pain/nasuea meds PRN  -Continue broad spec abx - PO cipro, flagyl - estimated end date 2/26/19  -WOCN following, appreciate help  -DVT/GI prophylaxis  -PT/OT  -Stable for discharge - discharge planning     Severe malnutrition    -TPN  -Continue tube feeds and titrate up to goal of 45cc/hr     Essential hypertension    -diltiazem, PO lasix     Gastroesophageal reflux disease without esophagitis    -Continue BID protonix     (HFpEF) heart failure with preserved ejection fraction    Last echo 8/2018 with no WMAs, grade 1DD, EF 60%  -Strict I/O, daily weights  -Continue diuresis     Moderate asthma without complication    -Continue with scheduled duonebs     Elevated troponin    -Cardiology consulted. On ASA/plavix at home     CAD (coronary artery disease)    -ASA, plavix     Type 2 diabetes mellitus    -SSI, appreciate endocrine assistance          Christopher Huang MD  General Surgery  [facility]   [Negative] : Allergic/Immunologic

## 2019-06-04 ENCOUNTER — PATIENT MESSAGE (OUTPATIENT)
Dept: WOUND CARE | Facility: CLINIC | Age: 64
End: 2019-06-04

## 2019-06-04 ENCOUNTER — OFFICE VISIT (OUTPATIENT)
Dept: WOUND CARE | Facility: CLINIC | Age: 64
End: 2019-06-04
Payer: MEDICARE

## 2019-06-04 DIAGNOSIS — T81.89XA NON-HEALING SURGICAL WOUND, INITIAL ENCOUNTER: Primary | ICD-10-CM

## 2019-06-04 DIAGNOSIS — E11.9 TYPE 2 DIABETES MELLITUS WITHOUT COMPLICATION, WITHOUT LONG-TERM CURRENT USE OF INSULIN: ICD-10-CM

## 2019-06-04 PROCEDURE — 99202 OFFICE O/P NEW SF 15 MIN: CPT | Mod: S$GLB,,, | Performed by: CLINICAL NURSE SPECIALIST

## 2019-06-04 PROCEDURE — 3044F PR MOST RECENT HEMOGLOBIN A1C LEVEL <7.0%: ICD-10-PCS | Mod: S$GLB,,, | Performed by: CLINICAL NURSE SPECIALIST

## 2019-06-04 PROCEDURE — 99999 PR PBB SHADOW E&M-EST. PATIENT-LVL II: ICD-10-PCS | Mod: PBBFAC,,, | Performed by: CLINICAL NURSE SPECIALIST

## 2019-06-04 PROCEDURE — 99999 PR PBB SHADOW E&M-EST. PATIENT-LVL II: CPT | Mod: PBBFAC,,, | Performed by: CLINICAL NURSE SPECIALIST

## 2019-06-04 PROCEDURE — 99202 PR OFFICE/OUTPT VISIT, NEW, LEVL II, 15-29 MIN: ICD-10-PCS | Mod: S$GLB,,, | Performed by: CLINICAL NURSE SPECIALIST

## 2019-06-04 PROCEDURE — 3044F HG A1C LEVEL LT 7.0%: CPT | Mod: S$GLB,,, | Performed by: CLINICAL NURSE SPECIALIST

## 2019-06-04 NOTE — LETTER
June 4, 2019      Monster Laurent MD  [address]  [facility] [address]           [facility]  [address]  [facility] [address]  Phone: [phone]          Patient: Sheryl Martines   MR Number: [id number]   YOB: 1955   Date of Visit: 6/4/2019       Dear Dr. Monster Laurent:    Thank you for referring Sheryl Martines to me for evaluation. Attached you will find relevant portions of my assessment and plan of care.    If you have questions, please do not hesitate to call me. I look forward to following Sheryl Martines along with you.    Sincerely,    Clair Humphreys, CNS    Enclosure  CC:  No Recipients    If you would like to receive this communication electronically, please contact [email or web address] or [phone] to request more information on [facility] Link access.    For providers and/or their staff who would like to refer a patient to [facility], please contact us through our one-stop-shop provider referral line, Jaqueline Mazariegos, at [phone].    If you feel you have received this communication in error or would no longer like to receive these types of communications, please e-mail [email or web address]

## 2019-06-04 NOTE — PROGRESS NOTES
"Subjective:       Patient ID: Sheryl Martines is a 63 y.o. female.    Chief Complaint: Wound Check    This is a new pt who was sent by [facility] to assess non healing wound and make some recs for care, pt followed by [facility] and PT, she has surgery last Aug for appendectomy that led to many complications and then an exploratory in January 2019 with long post op course as well. She is now home, living with daughter and slowly regaining her strength, she had fx of leg so that has been part of her rehab needs as well. She comes in w/c today .  In very good spirits    Review of Systems   Constitutional: Negative for activity change, appetite change and fever.   HENT: Negative for congestion and rhinorrhea.    Respiratory: Negative for cough and shortness of breath.    Cardiovascular: Negative for chest pain.   Gastrointestinal: Negative.    Genitourinary: Negative.    Musculoskeletal: Negative.    Skin: Positive for wound.   Neurological: Negative.    Psychiatric/Behavioral: Negative.        Objective:      Physical Exam   Constitutional: She appears well-developed and well-nourished.   Pulmonary/Chest: Effort normal. No respiratory distress.   Abdominal: Soft. Bowel sounds are normal. She exhibits no distension.   Musculoskeletal: Normal range of motion. She exhibits no edema.   Skin: Skin is warm and dry.   Psychiatric: She has a normal mood and affect.       6/4    This last area of abd wound is 4 mm opening and 3 cm deep only. It does have a creamy grey drainage when the hydrofera is removed comes "running" out, the dressing is working like a plug, so need to try something else.  Suggest the following plan of care   WOUND CARE DAILY  ( daughter can do and was taught)  Cleanse wound with Vashe and instill in tunnel and allow to dwell there for 3-5 min  Then drain out excess ( pt just has to bend or stand)  Pack the wound with 1/4" AMD strip packing  ( I gave her this)  Cover with small MEPOR or equiv    Home Health " nurse to visit once a week and eval depth   Encouraged pt to obtain and drink 2 packs of KWADWO a day for wound healing        Assessment:       1. Non-healing surgical wound, initial encounter    2. Type 2 diabetes mellitus without complication, without long-term current use of insulin        Plan:       Home health has discharged her , so will have daughter report back to me next week and see if progress made.   Daughter shown how to use straw to irrigate the wound , supplies given  Return if not improved in 3 weeks or so  I have reviewed the plan of care with the patient and/ or caregiver and they express understanding. I spent over 50% of this 20 minute visit in face to face counseling.

## 2019-06-06 ENCOUNTER — TELEPHONE (OUTPATIENT)
Dept: SURGERY | Facility: HOSPITAL | Age: 64
End: 2019-06-06

## 2019-06-06 PROCEDURE — G0180 PR HOME HEALTH MD CERTIFICATION: ICD-10-PCS | Mod: ,,, | Performed by: SURGERY

## 2019-06-06 PROCEDURE — G0180 MD CERTIFICATION HHA PATIENT: HCPCS | Mod: ,,, | Performed by: SURGERY

## 2019-06-06 NOTE — TELEPHONE ENCOUNTER
----- Message from Celina Capone RN sent at 6/6/2019  5:25 PM CDT -----  Contact: Ainsley From home health      ----- Message -----  From: Kaylin Rouse  Sent: 6/6/2019   4:26 PM  To: Juliann RAMSAY Staff    Needs Advice    Reason for call: Ainsley From home health would like a call from your office         Communication Preference: [phone]    Additional Information:

## 2019-06-07 ENCOUNTER — TELEPHONE (OUTPATIENT)
Dept: SURGERY | Facility: CLINIC | Age: 64
End: 2019-06-07

## 2019-06-07 NOTE — TELEPHONE ENCOUNTER
----- Message from Ashley Houston sent at 6/7/2019  1:55 PM CDT -----  Contact:  [facility] [phone]  Needs Advice    Reason for call:    states she is calling to report Pt. Elevated blood pressure 188/100 and would like to know what she should do     Communication Preference:   [facility] [phone]      Additional Information:    Thank You :)

## 2019-06-07 NOTE — TELEPHONE ENCOUNTER
Left message on voicemail for [facility] RN, Gelnna, that call was returned and for her to call back.  Phone number given for her to call back.

## 2019-06-24 ENCOUNTER — EXTERNAL HOME HEALTH (OUTPATIENT)
Dept: HOME HEALTH SERVICES | Facility: HOSPITAL | Age: 64
End: 2019-06-24
Payer: MEDICARE

## 2019-06-28 ENCOUNTER — EXTERNAL HOME HEALTH (OUTPATIENT)
Dept: HOME HEALTH SERVICES | Facility: HOSPITAL | Age: 64
End: 2019-06-28
Payer: MEDICARE

## 2019-07-08 NOTE — PLAN OF CARE
Problem: Occupational Therapy Goal  Goal: Occupational Therapy Goal  Goals to be met by: 8/31/2018    Patient will increase functional independence with ADLs by performing:    Feeding with Modified [place].  UE Dressing with Set-up Assistance.  LE Dressing with Minimal Assistance.  Grooming while seated with Set-up Assistance. --MET 8/31  Toileting from bedside commode with Minimal Assistance for hygiene and clothing management.   Sitting at edge of bed x20 minutes with Supervision. --MET 8/31  Rolling to Bilateral with Modified [place].   Supine to sit with Modified [place].  Stand pivot transfers with Stand-by Assistance.  Step transfer with Supervision and Stand-by Assistance  Toilet transfer to bedside commode with Stand-by Assistance.  Increased functional strength to WFL for BUE.  Upper extremity exercise program 10 reps per handout, with supervision.      Outcome: Ongoing (interventions implemented as appropriate)  Patient required increased (A) to stand from chair with PT this date. Issued min resistance theraband and completed HEP with (A) to improve BUE strength and endurance for functional transfers. Recommend SNF upon D/C 2* patient requiring increased (A) for transfers.        no

## 2019-08-20 ENCOUNTER — HOSPITAL ENCOUNTER (OUTPATIENT)
Dept: RADIOLOGY | Facility: HOSPITAL | Age: 64
Discharge: HOME | End: 2019-08-20
Attending: PHYSICIAN ASSISTANT
Payer: MEDICARE

## 2019-08-20 ENCOUNTER — OFFICE VISIT (OUTPATIENT)
Dept: ORTHOPEDICS | Facility: CLINIC | Age: 64
End: 2019-08-20
Payer: MEDICARE

## 2019-08-20 ENCOUNTER — TELEPHONE (OUTPATIENT)
Dept: ORTHOPEDICS | Facility: CLINIC | Age: 64
End: 2019-08-20

## 2019-08-20 VITALS — BODY MASS INDEX: 28.53 KG/M2 | WEIGHT: 151 LBS

## 2019-08-20 DIAGNOSIS — S82.201D CLOSED FRACTURE OF RIGHT TIBIA AND FIBULA WITH ROUTINE HEALING, SUBSEQUENT ENCOUNTER: Primary | ICD-10-CM

## 2019-08-20 DIAGNOSIS — S82.201D CLOSED FRACTURE OF RIGHT TIBIA AND FIBULA WITH ROUTINE HEALING, SUBSEQUENT ENCOUNTER: ICD-10-CM

## 2019-08-20 DIAGNOSIS — S82.401D CLOSED FRACTURE OF RIGHT TIBIA AND FIBULA WITH ROUTINE HEALING, SUBSEQUENT ENCOUNTER: Primary | ICD-10-CM

## 2019-08-20 DIAGNOSIS — S82.401D CLOSED FRACTURE OF RIGHT TIBIA AND FIBULA WITH ROUTINE HEALING, SUBSEQUENT ENCOUNTER: ICD-10-CM

## 2019-08-20 PROCEDURE — 3008F PR BODY MASS INDEX (BMI) DOCUMENTED: ICD-10-PCS | Mod: S$GLB,,, | Performed by: PHYSICIAN ASSISTANT

## 2019-08-20 PROCEDURE — 73590 XR TIBIA FIBULA 2 VIEW RIGHT: ICD-10-PCS | Mod: 26,RT,, | Performed by: RADIOLOGY

## 2019-08-20 PROCEDURE — 3008F BODY MASS INDEX DOCD: CPT | Mod: S$GLB,,, | Performed by: PHYSICIAN ASSISTANT

## 2019-08-20 PROCEDURE — 73590 X-RAY EXAM OF LOWER LEG: CPT | Mod: TC,RT

## 2019-08-20 PROCEDURE — 99213 OFFICE O/P EST LOW 20 MIN: CPT | Mod: S$GLB,,, | Performed by: PHYSICIAN ASSISTANT

## 2019-08-20 PROCEDURE — 73590 X-RAY EXAM OF LOWER LEG: CPT | Mod: 26,RT,, | Performed by: RADIOLOGY

## 2019-08-20 PROCEDURE — 99999 PR PBB SHADOW E&M-EST. PATIENT-LVL III: ICD-10-PCS | Mod: PBBFAC,,, | Performed by: PHYSICIAN ASSISTANT

## 2019-08-20 PROCEDURE — 99999 PR PBB SHADOW E&M-EST. PATIENT-LVL III: CPT | Mod: PBBFAC,,, | Performed by: PHYSICIAN ASSISTANT

## 2019-08-20 PROCEDURE — 99213 PR OFFICE/OUTPT VISIT, EST, LEVL III, 20-29 MIN: ICD-10-PCS | Mod: S$GLB,,, | Performed by: PHYSICIAN ASSISTANT

## 2019-08-20 NOTE — TELEPHONE ENCOUNTER
----- Message from Gume Garvin sent at 8/20/2019 10:46 AM CDT -----  Contact: pt daughter/Citlaly  Please call pt daughter at [phone] if any questions    Patient is running about 10-15 mins late    Thank you

## 2019-08-21 NOTE — PROGRESS NOTES
Subjective:      Patient ID: Sheryl Martines is a 64 y.o. female.    Chief Complaint: Follow-up (right tibia)    HPI    Patient is a 63 y.o. female  with history of DM, osteoporosis and pontine stroke 2012 presents to clinic for follow up for right Non displaced oblique right tibia fracture at metaphysis into diaphysis secondary to falling form a standing height on 10/06/2018 after her knees gave out.  Due to patients comorbid medical conditions and at present her blood sugar is running very high she decided to be treated non-operative. She has been weight bearing as tolerated and range of motion as tolerated. She stated that she was doing great. She has no pain in her shin area. She is using a walker to assist with ambulaiton. She stated that she is back a baseline without any pain form the fracture. She has historyof neuropathy.      Patient ambulates at baseline with Rollator.   On aspirin and plavix    Review of Systems   Constitution: Negative for chills and fever.   Cardiovascular: Negative for chest pain.   Respiratory: Negative for cough and shortness of breath.    Skin: Negative for color change, dry skin, itching, nail changes, poor wound healing and rash.   Musculoskeletal:        Right leg fracture.    Neurological: Negative for dizziness.   Psychiatric/Behavioral: Negative for altered mental status. The patient is not nervous/anxious.    All other systems reviewed and are negative.        Objective:      General    Constitutional: She is oriented to person, place, and time. She appears well-developed and well-nourished. No distress.   HENT:   Head: Atraumatic.   Eyes: Conjunctivae are normal.   Cardiovascular: Normal rate.    Pulmonary/Chest: Effort normal.   Neurological: She is alert and oriented to person, place, and time.   Psychiatric: She has a normal mood and affect. Her behavior is normal.           Right Knee Exam     Comments:  Presents to clinic with her daughter walked into clinic with  rolaltor, no  TTP to fracture site.   range of motion knee is 0-100       RADS:Patient's known fractures are not well visualized however bones are in normal anatomic alignment  Assessment:       Encounter Diagnosis   Name Primary?    Closed fracture of right tibia and fibula with routine healing, subsequent encounter Yes          Plan:       Discussed plan with patient and family, Continue non-operative treatment  - weight bearing as tolerated, range of motion as tolerated.   - pain medication: no refill needed  - return to clinic as needed.

## 2019-09-09 NOTE — PT/OT/SLP PROGRESS
"Occupational Therapy   Treatment    Name: Sheryl Martines  MRN: [id number]  Admitting Diagnosis:  Altered mental status       Recommendations:     Discharge Recommendations: nursing facility, skilled  Discharge Equipment Recommendations:     Barriers to discharge:       Subjective     "i could do better if I didn't have this pain."    Communicated with: RN prior to session.  Pain/Comfort:  · Pain Rating 1: 0/10  · Pain Rating Post-Intervention 1: 7/10  · Location - Orientation 2: midline  · Location 2: back    Patients cultural, spiritual, Yazidism conflicts given the current situation:      Objective:     Patient found with:      General Precautions: Standard, fall   Orthopedic Precautions:N/A   Braces:       Occupational Performance:    Bed Mobility:    · Up in chair.    Functional Mobility/Transfers:  · Patient completed Sit <> Stand Transfer with moderate assistance  with  4 wheeled walker (from low chair).  · Functional Mobility: Pt walked ~ 20' CGA with a rollator and report of increased fatigue.    Activities of Daily Living:  · Grooming: supervision sitting on rollator 2/2 fatigue/back pain.    Patient left up in chair with call button in reach    [facility] 6 Click:  [facility] Total Score: 19    Treatment & Education:  From chair level, pt completed 15 reps of shld presses & elbow flex/ext --- remainder of therex halted due to c/o increasing back pain. Hot packs applied to back area to relieve pain.  Pt unable to brush her teeth in standing due to fatigue/back pain.  Education:    Assessment:     Sheryl Martines is a 63 y.o. female with a medical diagnosis of Altered mental status.  She presents with c/o 9/10 back pain limiting her participation this session and necessitating multiple sitting rest breaks. Continue OT to facilitate maximized functional performance.  Performance deficits affecting function are weakness, impaired endurance, impaired self care skills, impaired balance, gait instability, impaired " functional mobilty, decreased safety awareness, decreased coordination, pain.      Rehab Prognosis:  Good; patient would benefit from acute skilled OT services to address these deficits and reach maximum level of function.       Plan:     Patient to be seen 4 x/week to address the above listed problems via self-care/home management, therapeutic activities, therapeutic exercises  · Plan of Care Expires: 10/23/18  · Plan of Care Reviewed with: patient    This Plan of care has been discussed with the patient who was involved in its development and understands and is in agreement with the identified goals and treatment plan    GOALS:   Multidisciplinary Problems     Occupational Therapy Goals        Problem: Occupational Therapy Goal    Goal Priority Disciplines Outcome Interventions   Occupational Therapy Goal     OT, PT/OT Ongoing (interventions implemented as appropriate)    Description:  Goals to be met by: 10/9/18    Patient will increase functional independence with ADLs by performing:    Feeding with Minimal Assistance.  UE Dressing with Minimal Assistance.  LE Dressing with Maximum assistance. MET 9/28  REVISED to SBA.  Grooming while seated at sink with Minimal Assistance. MET 10/2  REVISED to in standing with SBA.  Toileting from bedside commode with Moderate Assistance for hygiene and clothing management.   Supine to sit with Minimal Assistance. MET 9/28  REVISED to SBA.  Toilet transfer to toilet with Moderate Assistance.                        Time Tracking:     OT Date of Treatment: 10/02/18  OT Start Time: 1101  OT Stop Time: 1130  OT Total Time (min): 29 min    Billable Minutes:Self Care/Home Management 15  Therapeutic Activity 14    NAVI Wiggins  10/2/2018     Detail Level: Detailed Quality 130: Documentation Of Current Medications In The Medical Record: Current Medications Documented Quality 131: Pain Assessment And Follow-Up: No documentation of pain assessment, reason not given Quality 402: Tobacco Use And Help With Quitting Among Adolescents: Patient screened for tobacco and never smoked Quality 110: Preventive Care And Screening: Influenza Immunization: Influenza Immunization Administered during Influenza season

## 2019-10-26 NOTE — PLAN OF CARE
NEPHROLOGY PROGRESS NOTE   Pam Oliveira [de-identified] y o  female MRN: [id number]  Unit/Bed#: -01 Encounter: [id number]      ASSESSMENT    1  Acute on chronic hyponatremia admitted with a sodium of 126 now with a sodium of 138  -thought to be secondary to SIADH  -extensive workup was done please review previous notes  -now on salt tablets 1 g 2 times daily with a 1 2 L fluid restrict  2  Anemia-with a hemoglobin that is stable  3  Encephalopathy-unclear etiology also with a fever 48 hours ago ruling out fluid  4   Hypertension with blood pressures that are stable Will on amlodipine 5 mg daily and atenolol 50 mg daily    IMPRESSION & PLAN    Encephalopathy workup ongoing  Ruling out flu for fevers  Not on any antibiotic  Continue salt tablets 1 g 2 times daily and fluid restriction no other signs of sepsis at this time  Repeat BMP tomorrow  Following up on immunofixation sent for hyponatremia work up    SUBJECTIVE:    Patient was seen today does verbalize but in coherent  No fevers this morning    OBJECTIVE:  Current Weight: Weight - Scale: (Bed scale malfunctioning, pt too weak to stand)  Vitals:    10/26/19 0710   BP: (!) 114/48   Pulse: 86   Resp: 15   Temp: 99 2 °F (37 3 °C)   SpO2: 93%       Intake/Output Summary (Last 24 hours) at 10/26/2019 1504  Last data filed at 10/26/2019 1428  Gross per 24 hour   Intake 240 ml   Output 450 ml   Net -210 ml       General: conscious, cooperative, in not acute distress  Eyes: conjunctivae pink, anicteric sclerae  ENT: lips and mucous membranes moist  Neck: supple, no JVD  Chest:  Decreased breath sounds  CVS: distinct S1 & S2, normal rate, regular rhythm  Abdomen: soft, non-tender, non-distended, normoactive bowel sounds  Extremities: no edema of both legs  Skin: no rash  Neuro: awake, alert, oriented        Medications:    Current Facility-Administered Medications:     acetaminophen (TYLENOL) tablet 650 mg, 650 mg, Oral, Q6H PRN, Trina Reece PA-C, 650 mg at 10/25/19 2062   Problem: Occupational Therapy Goal  Goal: Occupational Therapy Goal  Pt will perform oral care sitting in w/c or chair with set up assist.  Pt will self feeding with set up assist while seated upright.  Pt will sit EOB for 10 minutes without LOB during functional activity.   Outcome: Ongoing (interventions implemented as appropriate)  Evaluation completed.  OT plan of care developed and reviewed with patient.     Recommend [facility] SNF for purposes of pt needing max-total A for stand pivot transfer. Pt is highly likely to progress to home with home health after a few therapy sessions here.    NAVI Horan  11/2/2018  Rehab Services         aluminum-magnesium hydroxide-simethicone (MYLANTA) 200-200-20 mg/5 mL oral suspension 30 mL, 30 mL, Oral, Q6H PRN, Diandra Light MD    amLODIPine (NORVASC) tablet 5 mg, 5 mg, Oral, Daily, Dean Redding MD, 5 mg at 10/25/19 0925    atenolol (TENORMIN) tablet 50 mg, 50 mg, Oral, Daily, Cassius Dial MD, 50 mg at 10/25/19 3454    docusate sodium (COLACE) capsule 100 mg, 100 mg, Oral, BID PRN, Diandra Light MD    enoxaparin (LOVENOX) subcutaneous injection 40 mg, 40 mg, Subcutaneous, Daily, Diandra Light MD, 40 mg at 10/26/19 0929    famotidine (PEPCID) oral suspension 20 mg, 20 mg, Oral, Daily, Dean Redding MD, 20 mg at 10/26/19 0930    ferrous sulfate tablet 325 mg, 325 mg, Oral, Daily With Breakfast, Cassius Dial MD, 325 mg at 10/26/19 0929    insulin lispro (HumaLOG) 100 units/mL subcutaneous injection 1-5 Units, 1-5 Units, Subcutaneous, HS, Diandra Light MD, 1 Units at 10/25/19 2302    insulin lispro (HumaLOG) 100 units/mL subcutaneous injection 1-6 Units, 1-6 Units, Subcutaneous, TID AC, 1 Units at 10/26/19 1121 **AND** Fingerstick Glucose (POCT), , , TID AC, Diandra Light MD    [facility]) injection 4 mg, 4 mg, Intravenous, Q6H PRN, Diandra Light MD, 4 mg at 10/25/19 0919    pravastatin (PRAVACHOL) tablet 40 mg, 40 mg, Oral, Daily With Dinner, Diandra Light MD, 40 mg at 10/25/19 1703    sodium chloride tablet 1 g, 1 g, Oral, BID With Meals, Dean Redding MD, 1 g at 10/26/19 0929    Invasive Devices:   Urethral Catheter Straight-tip 16 Fr   (Active)   Amt returned on insertion(mL) 20 mL 10/14/2019  9:52 PM   Reasons to continue Urinary Catheter  Chronic urinary catheter 10/25/2019  9:00 PM   Goal for Removal N/A- discharging with herrera 10/25/2019  9:00 PM   Site Assessment Clean;Skin intact 10/25/2019  9:00 PM   Collection Container Standard drainage bag 10/25/2019  9:00 PM   Securement Method Securing device (Describe) 10/25/2019  9:00 PM   Output (mL) 400 mL 10/26/2019  2:28 PM     Lab Results:   Results from last 7 days   Lab Units 10/26/19  0927 10/26/19  0529 10/25/19  1142 10/25/19  0626 10/24/19  0537 10/23/19  0534 10/22/19  0550  10/20/19  0449   WBC Thousand/uL 9 15  --  10 36*  --  5 79 6 45 5 97   < > 8 15   HEMOGLOBIN g/dL 8 6*  --  9 0*  --  9 3* 8 6* 9 1*   < > 9 0*   HEMATOCRIT % 28 1*  --  29 5*  --  30 8* 29 1* 30 0*   < > 29 4*   PLATELETS Thousands/uL 369  --  397*  --  349 389 418*   < > 399*   POTASSIUM mmol/L  --  3 6  --  4 1 3 9 4 2 4 0   < > 4 1   CHLORIDE mmol/L  --  107  --  105 103 108 106   < > 104   CO2 mmol/L  --  22  --  23 22 22 21   < > 22   BUN mg/dL  --  19  --  13 12 13 13   < > 14   CREATININE mg/dL  --  0 52*  --  0 54* 0 49* 0 56* 0 47*   < > 0 60   CALCIUM mg/dL  --  8 3  --  9 0 8 4 8 7 8 6   < > 8 6   MAGNESIUM mg/dL  --   --   --   --   --   --   --   --  1 5*   PHOSPHORUS mg/dL  --   --   --   --   --   --   --   --  3 3    < > = values in this interval not displayed

## 2019-12-07 NOTE — ASSESSMENT & PLAN NOTE
63 y.o. female with hx of asthma, COPD, CAD, DM, HTN, CVA 2012 with R side deficits, HFpEF, severe debility, lap converted to open appendectomy 8/2018 complicated by cdiff, failure to thrive.  Hx of difficult intubation and delayed emergence, CO2 narcosis needing ICU stay.  Hx of PRN O2 at night. Patient now s/p exploratory laparotomy, washout, and pelvic abscess drainage on 1/25/19.    Post-operatively, on initial evaluation in PACU, patient hypotensive to 60s/40s, on BiPap. Currently fluid resuscitating with 3rd liter of LR, responding well to phenylephrine, starting phenylephrine infusion (tachycardic to ~118). ABG 7.11/49/116/16, BE -14, on bipap 14/5, 35% FiO2. Intubated on 1/26 for airway protection. Extubated 1/28. Required CRRT on 1/26 for metabolic acidosis, now off.     Neuro:  Sedation: None  Pain: multimodal     CV:  - HDS  - Off pressors  - Lactate 3.4 --> 2.3 --> 2.6->2.2->1.8->2.1 (no longer trending)  - 1 unit pRBC 2/1  - 1 unit platelets 1/31  - H/H 7.7/23.8  - PRN hydralazine for HTN    Pulm:   - Extubated 1/28/19  - on RA  - prn CXR  - prn ABG    Renal:  Trend BUN/Cr: 27/0.6  Monitor Urine output (2L over past 24 hours)  Net +514  Nephro following, appreciate assistance  Receiving IV lasix 40 mg bid for diuresis    ID:  AF  1/24 blood cultures: gram positive cocci in clusters resembling staph  1/25 blood cultures: NGTD  1/25 operative cultures pending, ngtd thus far  - on cipro/metro/vanc  -wound vac removed by wound care 1/31; tolerated well    FEN/GI:  TPN @ 50  Replace lytes PRN  Last lactate 2.1; no longer trending  IV Lasix 40 mg bid  Trend Alk phos, LFTs  -Abdominal U/S 2/1: few gallstones, no acute cholecystitis   -Drain output 80cc    Endo:  Patient with IDDM2, on detemir 20u BID at home  1/27-1/28 with hyperglycemia into the 500s on insulin gtt @ 100u/hr  1/28-1/29 with hypoglycemia requiring d10 infusion @ 125cc/hr  SSI with minimal requirements    Heme:  H/H: 7.7/23.8  Plt 134  HIT  panel negative  No signs of bleeding at this time  Lovenox    Dispo: Stepdown  Primary: SICU/general surgery     Attending Only

## 2020-01-14 NOTE — PT/OT/SLP PROGRESS
Occupational Therapy   Treatment    Name: Sheryl Martines  MRN: [id number]  Admitting Diagnosis:  Closed fracture of right tibia and fibula       Recommendations:     Discharge Recommendations: nursing facility, skilled  Discharge Equipment Recommendations:     Barriers to discharge:  Inaccessible home environment, Decreased caregiver support    Subjective     Communicated with: nsg and PT prior to session.  Pain/Comfort:  · Pain Rating 1: (says it hurts when she moves)    Patients cultural, spiritual, Tenriism conflicts given the current situation: none    Objective:     Patient found with: telemetry    General Precautions: Standard, fall   Orthopedic Precautions:RLE non weight bearing   Braces:       Occupational Performance:    Bed Mobility:    · Sup to sit with min assist for RLE     Functional Mobility/Transfers:  · Sit to stand performed 1 rep with mod assist x 2, stood x ~5 -10 sec  · Functional Mobility: u/a to t/f to chair due to c/o pain in standing  · Tolerated sitting EOB x ~20 min  ·     Activities of Daily Living:  · Feeding with HOB up with mod I   · Sock with total assist  · Gown donned seated EOB with setup assist  · Grooming seated EOB with setup assist     Patient left HOB elevated with all lines intact and call button in reach    [facility] 6 Click:  [facility] Total Score:      Treatment & Education:  -Pt edu on OT role/POC  -Importance of EOB activity as tolerated with staff assistance and any mobility/ UE and LE exercises in bed as tolerated; educated on bridging exercises in bed   -Safety during functional t/f and mobility and WB precautions   - White board updated  - Multiple self care tasks completed-- assistance level noted above  - All questions/concerns answered within OT scope of practice           Education:    Assessment:     Sheryl Martines is a 63 y.o. female with a medical diagnosis of Closed fracture of right tibia and fibula.  She presents with improved mobility and good effort with  activity.  Performance deficits affecting function are weakness, impaired endurance, impaired self care skills, impaired functional mobilty, pain, decreased lower extremity function, orthopedic precautions.      Rehab Prognosis:  good; patient would benefit from acute skilled OT services to address these deficits and reach maximum level of function.       Plan:     Patient to be seen 4 x/week to address the above listed problems via self-care/home management, therapeutic activities, therapeutic exercises  · Plan of Care Expires: 11/03/18  · Plan of Care Reviewed with: patient    This Plan of care has been discussed with the patient who was involved in its development and understands and is in agreement with the identified goals and treatment plan    GOALS:   Multidisciplinary Problems     Occupational Therapy Goals        Problem: Occupational Therapy Goal    Goal Priority Disciplines Outcome Interventions   Occupational Therapy Goal     OT, PT/OT Ongoing (interventions implemented as appropriate)    Description:  Goals to be met by: 10/18/18     Patient will increase functional independence with ADLs by performing:    UE Dressing with Set-up Assistance and Supervision.  LE Dressing with Minimal Assistance, with modification/AE prn  Grooming while seated with Supervision.  Toileting from bedside commode with Moderate Assistance for hygiene and clothing management.   Toilet transfer to bedside commode with Minimal Assistance.                      Time Tracking:     OT Date of Treatment: 10/06/18  OT Start Time: 0855  OT Stop Time: 0931  OT Total Time (min): 36 min    Billable Minutes:Self Care/Home Management 15 min  Therapeutic Activity 15 min    Cathy Lewis OT  10/6/2018     Authored by Resident/PA/NP

## 2021-07-09 NOTE — NURSING
Attending MD notified of patient's BG of >500 per blood glucose machine X2. MD notified and patient administered insulin per new MAR orders - 7 units of insulin aspart and 24 units of insulin detemir. Will recheck blood glucose at 1330 and contact physician.    Discharge instructions reviewed; patient verbalized understanding; patient transferred from ED via private vehicle.       Shruthi Quezada RN  07/09/21 [id number]

## 2022-03-13 NOTE — SUBJECTIVE & OBJECTIVE
"Interval HPI:   Overnight events: Patient developed NIKHIL and hyperkalemia. Started on insulin gtt yesterday and switched to low dose correction scale today. Received 1 unit pRBC for Hb <7. Planned for HIDA scan today. She is more awake and alert today.  Eating:   NPO  Nausea: No  Hypoglycemia and intervention: No  Fever: No  TPN and/or TF: No    BP (!) 151/126 (BP Location: Right arm, Patient Position: Lying)   Pulse 69   Temp 97.4 °F (36.3 °C) (Axillary) Comment (Src): Pt. on BiPap  Resp (!) 21   Ht 5' 1" (1.549 m)   Wt 87.1 kg (192 lb)   LMP  (LMP Unknown)   SpO2 100%   Breastfeeding? No   BMI 36.28 kg/m²     Labs Reviewed and Include    Recent Labs   Lab 02/22/19  0628   *   CALCIUM 9.5   ALBUMIN 2.5*   PROT 5.8*      K 5.9*   CO2 28      BUN 18   CREATININE 1.2   ALKPHOS 1,728*   ALT 27   *   BILITOT 5.2*     Lab Results   Component Value Date    WBC 15.24 (H) 02/22/2019    HGB 6.2 (L) 02/22/2019    HCT 20.7 (L) 02/22/2019    MCV 95 02/22/2019     02/22/2019     Recent Labs   Lab 02/19/19  1441   TSH 4.718*   FREET4 0.70*     Lab Results   Component Value Date    HGBA1C 4.7 01/24/2019       Nutritional status:   Body mass index is 36.28 kg/m².  Lab Results   Component Value Date    ALBUMIN 2.5 (L) 02/22/2019    ALBUMIN 2.4 (L) 02/22/2019    ALBUMIN 1.5 (L) 02/21/2019     Lab Results   Component Value Date    PREALBUMIN 3 (L) 02/21/2019    PREALBUMIN 4 (L) 02/18/2019    PREALBUMIN 5 (L) 02/14/2019       Estimated Creatinine Clearance: 48.1 mL/min (based on SCr of 1.2 mg/dL).    Accu-Checks  Recent Labs     02/21/19  2310 02/21/19  2358 02/22/19  0103 02/22/19  0207 02/22/19  0302 02/22/19  0403 02/22/19  0527 02/22/19  0611 02/22/19  0650 02/22/19  0759   POCTGLUCOSE 192* 162* 174* 163* 156* 149* 115* 113* 135* 139*       Current Medications and/or Treatments Impacting Glycemic Control  Immunotherapy:    Immunosuppressants     None        Steroids:   Hormones (From " admission, onward)    Start     Stop Route Frequency Ordered    02/20/19 1115  hydrocortisone sodium succinate injection 100 mg      02/25 1359 IV Every 8 hours 02/20/19 1102        Pressors:    Autonomic Drugs (From admission, onward)    None        Hyperglycemia/Diabetes Medications:   Antihyperglycemics (From admission, onward)    Start     Stop Route Frequency Ordered    02/22/19 1036  insulin aspart U-100 pen 0-5 Units      -- SubQ Every 4 hours PRN 02/22/19 0913         Private car

## 2022-08-04 NOTE — SUBJECTIVE & OBJECTIVE
History and Physical      Kiara Thibodeaux  YOB: 1962    Date of Service:  8/4/2022    Chief Complaint:   Kiara Thibodeaux is a 61 2020,normal   Last dental exam: 2022    11/01/2022    Breast cancer screen  03/04/2023    Lipids  12/16/2025    Hepatitis A Past Medical History:   Diagnosis Date    Asthma     Closed compression fracture of fourth lumbar vertebra     COPD (chronic obstructive pulmonary disease)     Coronary artery disease     Diabetes mellitus     Glaucoma     High cholesterol     Hypertension     Iritis     Pulmonary embolus     Stroke     rt sided weakness.       Past Surgical History:   Procedure Laterality Date    ABDOMINAL SURGERY      APPENDECTOMY N/A 8/26/2018    Procedure: APPENDECTOMY;  Surgeon: Bernadine Melendrez MD;  Location: [facility] OR;  Service: General;  Laterality: N/A;    APPENDECTOMY N/A 8/26/2018    Performed by Bernadine Melendrez MD at [facility] OR    APPENDECTOMY, LAPAROSCOPIC---CONVERTED TO OPEN APPENDECTOMY @0950 N/A 8/26/2018    Performed by Bernadine Melendrez MD at [facility] OR    BACK SURGERY      stimulator    CATARACT EXTRACTION      HYSTERECTOMY      LAPAROSCOPIC APPENDECTOMY N/A 8/26/2018    Procedure: APPENDECTOMY, LAPAROSCOPIC---CONVERTED TO OPEN APPENDECTOMY @0950;  Surgeon: Bernadine Melendrez MD;  Location: [facility] OR;  Service: General;  Laterality: N/A;       Review of patient's allergies indicates:   Allergen Reactions    Ace inhibitors Swelling    Corticosteroids (glucocorticoids)     Hydralazine analogues     Tetracyclines Swelling    Travatan (with benzalkonium) [travoprost (benzalkonium)]        Current Facility-Administered Medications on File Prior to Encounter   Medication    [COMPLETED] potassium chloride SA CR tablet 20 mEq    [DISCONTINUED] 0.9%  NaCl infusion (for blood administration)    [DISCONTINUED] 0.9%  NaCl infusion (for blood administration)    [DISCONTINUED] acetaminophen tablet 650 mg    [DISCONTINUED] albuterol inhaler 2 puff    [DISCONTINUED] albuterol-ipratropium 2.5 mg-0.5 mg/3 mL nebulizer solution 3 mL    [DISCONTINUED] aspirin EC tablet 81 mg    [DISCONTINUED] clopidogrel tablet 75 mg    [DISCONTINUED] dextrose 50% injection 12.5 g    [DISCONTINUED] dextrose 50%  injection 25 g    [DISCONTINUED] diltiaZEM 24 hr capsule 180 mg    [DISCONTINUED] DULoxetine DR capsule 60 mg    [DISCONTINUED] enoxaparin injection 40 mg    [DISCONTINUED] ertapenem (INVANZ) 1 g in sodium chloride 0.9 % 100 mL IVPB (ready to mix system)    [DISCONTINUED] furosemide injection 20 mg    [DISCONTINUED] furosemide tablet 40 mg    [DISCONTINUED] glucagon (human recombinant) injection 1 mg    [DISCONTINUED] glucose chewable tablet 16 g    [DISCONTINUED] glucose chewable tablet 24 g    [DISCONTINUED] insulin aspart U-100 pen 0-5 Units    [DISCONTINUED] insulin detemir U-100 pen 10 Units    [DISCONTINUED] Lactobacillus acidoph-L.bulgar 1 million cell tablet 4 tablet    [DISCONTINUED] losartan tablet 100 mg    [DISCONTINUED] megestrol tablet 40 mg    [DISCONTINUED] ondansetron injection 4 mg    [DISCONTINUED] pantoprazole EC tablet 40 mg    [DISCONTINUED] prazosin capsule 5 mg    [DISCONTINUED] predniSONE tablet 10 mg    [DISCONTINUED] ramelteon tablet 8 mg    [DISCONTINUED] simethicone chewable tablet 125 mg    [DISCONTINUED] simvastatin tablet 40 mg    [DISCONTINUED] sodium chloride 0.9% flush 5 mL    [DISCONTINUED] theophylline 24 hr capsule 200 mg    [DISCONTINUED] traMADol tablet 50 mg    [DISCONTINUED] vancomycin 750 mg in dextrose 5 % 250 mL IVPB (ready to mix system)    [DISCONTINUED] zinc oxide 20 % ointment     Current Outpatient Medications on File Prior to Encounter   Medication Sig    albuterol (ACCUNEB) 0.63 mg/3 mL Nebu Take 0.83 mg by nebulization every 6 (six) hours as needed. Rescue     aspirin (ECOTRIN) 81 MG EC tablet Take 1 tablet (81 mg total) by mouth once daily.    baclofen (LIORESAL) 10 MG tablet Take 10 mg by mouth 3 (three) times daily.    cloNIDine 0.2 mg/24 hr td ptwk (CATAPRES) 0.2 mg/24 hr Place 1 patch onto the skin every 7 days.    clopidogrel (PLAVIX) 75 mg tablet Take 75 mg by mouth once daily.    diclofenac sodium 1 % Gel Apply topically  once daily. for 10 days as directed    diltiaZEM (CARDIZEM CD) 180 MG 24 hr capsule Take 180 mg by mouth once daily.    DULoxetine (CYMBALTA) 60 MG capsule Take 60 mg by mouth once daily.    fluticasone-salmeterol 500-50 mcg/dose (ADVAIR DISKUS) 500-50 mcg/dose DsDv diskus inhaler Inhale 1 puff into the lungs 2 (two) times daily. Controller    furosemide (LASIX) 40 MG tablet Take 40 mg by mouth once daily.     gabapentin (NEURONTIN) 300 MG capsule Take 300 mg by mouth 3 (three) times daily.    insulin detemir U-100 (LEVEMIR) 100 unit/mL injection Inject 20 Units into the skin 2 (two) times daily.     insulin lispro (HUMALOG PEN SUBQ) Inject into the skin.    lansoprazole (PREVACID) 30 MG capsule Take 30 mg by mouth once daily.    losartan potassium (COZAAR ORAL) Take 100 mg by mouth.     ondansetron (ZOFRAN) 4 MG tablet Take 1 tablet (4 mg total) by mouth every 6 (six) hours as needed.    prazosin (MINIPRESS) 5 MG capsule Take 5 mg by mouth 2 (two) times daily.     predniSONE (DELTASONE) 10 MG tablet Take 10 mg by mouth 2 (two) times daily.     pyridoxine, vitamin B6, (VITAMIN B-6) 50 MG Tab Take 50 mg by mouth once daily.    simvastatin (ZOCOR) 40 MG tablet Take 40 mg by mouth every evening.    sitagliptin phosphate (JANUVIA ORAL) Take by mouth.    theophylline (THEODUR) 200 MG 12 hr tablet Take 200 mg by mouth once daily.     theophylline anhydrous (UNIPHYL ORAL) Take by mouth.    traMADol (ULTRAM-ER) 100 MG Tb24 Take 50 mg by mouth daily as needed.     Family History     Problem Relation (Age of Onset)    Cancer Father    Diabetes Brother        Tobacco Use    Smoking status: Never Smoker   Substance and Sexual Activity    Alcohol use: No     Frequency: Never    Drug use: No    Sexual activity: No     Partners: Male     Review of Systems   Constitutional: Negative for chills, fatigue and fever.   HENT: Negative for congestion, facial swelling, hearing loss and trouble swallowing.    Eyes:  Negative for photophobia and visual disturbance.   Respiratory: Negative for chest tightness, shortness of breath and wheezing.    Cardiovascular: Negative for chest pain, palpitations and leg swelling.   Gastrointestinal: Negative for abdominal pain, blood in stool, constipation, diarrhea, nausea and vomiting.   Endocrine: Negative.    Genitourinary: Negative.    Musculoskeletal: Negative for back pain, joint swelling and myalgias.   Skin: Negative.    Allergic/Immunologic: Negative.    Neurological: Negative for dizziness, facial asymmetry, speech difficulty, weakness and numbness.   Hematological: Negative.    Psychiatric/Behavioral: Negative for agitation, confusion and dysphoric mood. The patient is not nervous/anxious.      Objective:     Vital Signs (Most Recent):  Temp: 98.7 °F (37.1 °C) (09/21/18 1144)  Pulse: 103 (09/21/18 1144)  Resp: 20 (09/21/18 1144)  BP: (!) 154/80 (09/21/18 1144)  SpO2: 96 % (09/21/18 1144) Vital Signs (24h Range):  Temp:  [97.2 °F (36.2 °C)-98.9 °F (37.2 °C)] 98.7 °F (37.1 °C)  Pulse:  [] 103  Resp:  [16-20] 20  SpO2:  [96 %-100 %] 96 %  BP: (138-154)/(79-80) 154/80     Weight: 73.4 kg (161 lb 13.1 oz)  Body mass index is 30.58 kg/m².    Physical Exam   Constitutional: She is oriented to person, place, and time. Vital signs are normal. She appears well-developed and well-nourished. She is cooperative.  Non-toxic appearance. She does not appear ill. No distress.   Morbidly obese    HENT:   Head: Normocephalic and atraumatic.   Eyes: Conjunctivae and EOM are normal. Pupils are equal, round, and reactive to light. No scleral icterus.   Neck: Normal range of motion and full passive range of motion without pain. Neck supple. No JVD present. Carotid bruit is not present. No thyromegaly present.   Cardiovascular: Normal rate, regular rhythm, S1 normal, S2 normal, normal heart sounds and normal pulses. Exam reveals no gallop, no S3, no S4 and no friction rub.   No murmur  heard.      Pulmonary/Chest: Effort normal and breath sounds normal. No accessory muscle usage. No tachypnea. No respiratory distress. She has no decreased breath sounds. She has no wheezes. She has no rhonchi. She has no rales.   Abdominal: Soft. Normal appearance and bowel sounds are normal. She exhibits no shifting dullness, no distension, no abdominal bruit and no ascites. There is no hepatosplenomegaly. There is no tenderness. There is no rigidity and no guarding.       Musculoskeletal: Normal range of motion. She exhibits no edema or tenderness.   Neurological: She is alert and oriented to person, place, and time. She has normal strength. She is not disoriented. No cranial nerve deficit or sensory deficit. GCS eye subscore is 4. GCS verbal subscore is 5. GCS motor subscore is 6.   Skin: Skin is warm, dry and intact. No abrasion and no lesion noted.   Psychiatric: She has a normal mood and affect. Her behavior is normal. Judgment and thought content normal. Her mood appears not anxious. Her speech is not slurred. She is not actively hallucinating. Cognition and memory are normal. She does not exhibit a depressed mood. She is attentive.       Significant Labs:   BMP:   Recent Labs   Lab  09/20/18   0505   GLU  121*   NA  140   K  3.3*   CL  98   CO2  34*   BUN  12   CREATININE  0.9   CALCIUM  8.8   MG  1.8     CBC:   Recent Labs   Lab  09/20/18   0505   WBC  6.95   HGB  10.8*   HCT  35.7*   PLT  179       Significant Imaging: I have reviewed all pertinent imaging results/findings within the past 24 hours.   

## 2022-12-29 NOTE — NURSING
Magnesium IVPB never sent from pharmacy and not stocked in [facility]. Pharmacy made aware; I sent a note and spoke with pharmacist Jim. Magnesium on back-order.    History of recent stroke

## 2023-05-12 NOTE — PT/OT/SLP PROGRESS
Speech Language Pathology      Sheryl Martines  MRN: [id number]    SLP reviewed Pt with Nurse.  Patient not seen today for dysphagia therapy secondary to Other (Patient on continuous BiPAP). Will continue to monitor and f/u per ST POC. Thank you.    NAVID Cobb., [facility]-SLP  Speech-Language Pathology  Pager: [phone]  2/19/2019         unk

## 2023-06-13 NOTE — ED NOTES
Pt transported to room 1105 A via stretcher with escort Pt belongings are with pt, pt's daughter was notified of room number and pt condition stable on leaving the ED    No solid food/dairy/candy/gum after 10:45pm tonight, water is allowed before 05:45am tomorrow, patient to bring photo ID/insurance card; dress in comfortable clothes; no jewelries/valuables/contact lens; no smoking/alcohol drinking/recreational drug use today; escort to come with photo ID; address and call back number was provided./yes

## 2023-07-27 NOTE — ASSESSMENT & PLAN NOTE
- Few occasions of SBP ranging between 130-181 since re-admit  - possibly elevated d/t discomfort  - resume home antihypertensive regimen   - monitor and adjust therapy as indicated if persistently elevated   Patient

## 2023-08-16 NOTE — PT/OT/SLP PROGRESS
"Speech Language Pathology Treatment    Patient Name:  Sheryl Martines   MRN:  [id number]  Admitting Diagnosis: Pelvic abscess in female    Recommendations:                 General Recommendations:  consult with oral surgeon/dentist 2/2 palatal swelling, ongoing swallow assessment when feasible  Diet recommendations:  NPO, Liquid Diet Level: NPO   Aspiration Precautions: Strict aspiration precautions   General Precautions: Standard, aspiration, fall, contact  Communication strategies:  provide increased time to answer, go to room if call light pushed and pt mouthing words at times to communicate 2/2 pain/swelling along hard/soft palate    Subjective     Pt reviewed with nurse and MD team.   Pt presents anxious  She explains, "It hurts if my tongue touches it (swelling extending posteriorly between junction of hard and soft palate.)  No family present at bedside upon SLP attempt   Patient goals: to have some ice      Pain/Comfort:  · Pain Rating 1: 6/10  · Location 1: palate  · Pain Addressed 1: Cessation of Activity, Nurse notified, Distraction  · Pain Rating Post-Intervention 1: 6/10    Objective:     Has the patient been evaluated by SLP for swallowing?   Yes  Keep patient NPO? Yes   Current Respiratory Status: nasal cannula      Pt seen for ongoing swallow assessment. Pt found awake in bed with nurse at bedside, with nasal canula, peripheral IV, catheter, wound vac in place. Pt coughed on secretions which required suction via yankauer to clear. Pt with mildly hoarse vocal quality, adequate intensity with decreased articulatory precision t/o conversational tasks.  Pt grimaced during spontaneous speech tasks and reported pain when lingual surfaced contacted hard palate with lingual elevation or attempts to swallow secretions. Pt with decreased labial protrusion and lateralization.  PO trials held 2/2 aspiration risk.  Pt educated on SLP role, S/S aspiration, aspiration precautions, and plans to continue to monitor " palatal swelling prior to presentation of PO trials. She verbalized understanding of SLP education.. No additional questions noted. White board current.  Nurse and MD team notified of findings and concerns of Pt report of pain with palatal swelling and heaviness of tongue.     Assessment:     Sheryl Martines is a 63 y.o. female with an SLP diagnosis of risk for aspiration.  She presents with decreased lingual strength and decreased control of oral secretions.  Pt with c/o pain with swelling/sore along hard palate extending to soft palate.     Goals:   Multidisciplinary Problems     SLP Goals        Problem: SLP Goal    Goal Priority Disciplines Outcome   SLP Goal     SLP Ongoing (interventions implemented as appropriate)   Description:  Speech Language Pathology Goals  Goals expected to be met by 2/6/19  1. Pt will participate in ongoing swallow assessment  2. Educate Pt and family on S/S aspiration and aspiration precautions                          Plan:     · Patient to be seen:  2 x/week   · Plan of Care expires:  03/01/19  · Plan of Care reviewed with:  patient   · SLP Follow-Up:  Yes       Discharge recommendations:  nursing facility, skilled   Barriers to Discharge:  Level of Skilled Assistance Needed     Time Tracking:     SLP Treatment Date:   01/31/19  Speech Start Time:  1020  Speech Stop Time:  1030     Speech Total Time (min):  10 min    Billable Minutes: Treatment Swallowing Dysfunction 10    NAVID Cobb., [facility]-SLP  Speech-Language Pathology  Pager: [phone]      01/31/2019   Respiratory

## 2023-10-12 NOTE — NURSING
Dr. Lott stated to take a verbal order and it is ok to leave rectal tube in as needed to prevent skin breakdown. wctmp.   PRINCIPAL DISCHARGE DIAGNOSIS  Diagnosis: Laceration of skin involving tendon  Assessment and Plan of Treatment: s/p surgery   ABX as prescribed   Follow up with DR Joy

## 2023-11-02 NOTE — PROGRESS NOTES
Notified doctor on call for [facility] and informed him that the patient's daughter called and would like to speak to the doctor. I also informed him that the patient had become SOB when getting her off the bed pan and moving her up in the bed. She went to 89 % on 7 liters hi viry oxygen NC and RR 24 oxygen increased  to 9 liters. Patient was reassessed and was 100% on 9 liters and bumped down to 7 still 100% RR 24 HR 71. Patient stated she felt good.   Detail Level: Detailed Show Aperture Variable?: No Render Note In Bullet Format When Appropriate: Yes Number Of Freeze-Thaw Cycles: 1 freeze-thaw cycle Application Tool (Optional): Cry-AC Consent: The patient's verbal consent was obtained including but not limited to risks of crusting, scabbing, blistering, scarring, darker or lighter pigmentary change, recurrence, incomplete removal, and infection. Duration Of Freeze Thaw-Cycle (Seconds): 10 Post-Care Instructions: I reviewed with the patient in detail post-care instructions. Patient advised to use sun protection and avoid picking of any of the treated lesions. Discussed that blistering or scabbing may occur and patient may apply Vaseline to affected areas.

## 2023-11-19 NOTE — PLAN OF CARE
Problem: Patient Care Overview  Goal: Plan of Care Review  Alert and oriented X 4. Complaints back pain off and on, heat packs to back. Tylenol given PO. MD notified for unrelieved pain and patient given Ibuprofen 600 mg. Poss discharge home today. No falls. Will continue to monitor.       Trauma surgeon and PA at bedside     Daija LAN Guido  11/19/23 [id number]

## 2024-10-14 NOTE — PLAN OF CARE
placed call to Cathy at [facility]. Orders were reviewed and she stated the patient is clear to arrive for Skilled level admission today. Bedside nurse can call report to [phone]. Patient will admit to room#305.      Transportation arranged with [facility] (wheelchair van) to  patient within the hour. Patient requires oxygen.    4:05-  left patient's daughter voicemail notifying her of her mother's discharge. Patient informed as well.    Update- [facility] cannot transport patient on isolation and they are backed up on trips. Trip request cancelled.  Wheelchair van transportation arranged with [employer]. Dispatcher notified patient ready for  now and requires oxygen.      As per PCP office pt is not on statin  , cholesterol 168.   Patient and son declined statin

## 2024-12-07 NOTE — SUBJECTIVE & OBJECTIVE
"Interval HPI:   Overnight events: Remains in ICU, still not responding to commands.  WBC 17.59 this am.  BG at or below goal overnight without any correction scale insulin needs.  Prednisone 10 mg daily initially started then switched to hydrocortisone 100mg IV q8.  On IV antibiotics.  Eating:   NPO  Nausea: No  Hypoglycemia and intervention: No  Fever: No  TPN and/or TF: No    BP (!) 121/58 (BP Location: Right arm, Patient Position: Lying)   Pulse 76   Temp 99 °F (37.2 °C) (Axillary)   Resp 16   Ht 5' 1" (1.549 m)   Wt 77.1 kg (170 lb)   LMP  (LMP Unknown)   SpO2 (!) 94%   Breastfeeding? No   BMI 32.12 kg/m²     Labs Reviewed and Include    Recent Labs   Lab 02/20/19  0300   *   CALCIUM 10.4   ALBUMIN 1.4*   PROT 5.6*   *   K 4.0   CO2 35*      BUN 8   CREATININE 0.8   ALKPHOS 1,747*   ALT 23   *   BILITOT 4.8*     Lab Results   Component Value Date    WBC 17.59 (H) 02/20/2019    HGB 8.1 (L) 02/20/2019    HCT 27.5 (L) 02/20/2019    MCV 95 02/20/2019     02/20/2019     Recent Labs   Lab 02/19/19  1441   TSH 4.718*   FREET4 0.70*     Lab Results   Component Value Date    HGBA1C 4.7 01/24/2019       Nutritional status:   Body mass index is 32.12 kg/m².  Lab Results   Component Value Date    ALBUMIN 1.4 (L) 02/20/2019    ALBUMIN 1.4 (L) 02/19/2019    ALBUMIN 1.4 (L) 02/19/2019     Lab Results   Component Value Date    PREALBUMIN 4 (L) 02/18/2019    PREALBUMIN 5 (L) 02/14/2019    PREALBUMIN 6 (L) 02/07/2019       Estimated Creatinine Clearance: 67.6 mL/min (based on SCr of 0.8 mg/dL).    Accu-Checks  Recent Labs     02/18/19  1542 02/18/19  2205 02/19/19  0218 02/19/19  0635 02/19/19  1028 02/19/19  1426 02/19/19  1726 02/19/19  1931 02/19/19  2302 02/20/19  0259   POCTGLUCOSE 108 140* 137* 150* 182* 168* 190* 220* 185* 130*       Current Medications and/or Treatments Impacting Glycemic Control  Immunotherapy:    Immunosuppressants     None        Steroids:   Hormones (From " admission, onward)    Start     Stop Route Frequency Ordered    02/20/19 0900  predniSONE 5 mg/5 ml solution 10 mg      -- PER G TUBE Daily 02/19/19 1608        Pressors:    Autonomic Drugs (From admission, onward)    None        Hyperglycemia/Diabetes Medications:   Antihyperglycemics (From admission, onward)    Start     Stop Route Frequency Ordered    02/17/19 0902  insulin aspart U-100 pen 0-5 Units      -- SubQ Every 4 hours PRN 02/17/19 0803         None

## 2025-03-03 NOTE — ASSESSMENT & PLAN NOTE
Due to recent Pelvic abscess in female  CT abdomen showed a 4 x 3 cm enhancing fluid collection consistent with abscess.  She also had elevated lactate of 6.8.  She was admitted to the STICU service (Dr. Laurent) for initial management. Patient taken to OR on 01/25 for ex lap with drainage of abscess.  Postoperative course complicated by septic shock secondary to MRSA bacteremia for which she received 2 weeks of IV vancomycin and briefly required pressors, which have now been weaned off.  Wound culture also grew Bacteroides and patient completed 4 weeks of Cipro and Flagyl.  Patient remained intubated postoperatively and was successfully extubated on 01/28.  Initially, she was stabilized and transferred to the floor.     On the floor, patient was making some progress and being followed by [facility]; however, a rapid response was called on 02/19 due to somnolence.  ABG was consistent with acute hypercapnic respiratory failure, and she was placed on BiPAP and transferred back to SICU.  Infectious workup was significant for a UA with 39 WBCs, moderate bacteria, and moderate yeast; urine culture ultimately grew Candida for which she was started on Diflucan IV.    3/3 surgery looked at wounds and deferred to wound care  - 2/26 ID consult recommended no antibiotics . Will re-consult them on Monday 3/4 as her leukocytosis is not better.   - monitor       Unsuccessful attempt to contact patient for phone assessment.  LVM.  Navigation notified.

## 2025-07-23 NOTE — PLAN OF CARE
I am able to pull records from [place] showing history of Epilepsy and the use of Dilantin.   When I saw patient 3/2024 I was unable to verify medication and if she had been taking and refills at the time were not needed.  Our office has never provided this medication for the patient.     I am sending in a short term refill of the medication and providing a referral to Neurology: Dr. Casanova with [facility] to ensure that this is appropriate treatment for her as this medication requires routine monitoring of the levels     I have placed a Dilantin level that I would like patient to have collected.      Problem: Patient Care Overview  Goal: Plan of Care Review  Outcome: Ongoing (interventions implemented as appropriate)  No respiratory distress noted at this time. Will continue to monitor pt.

## (undated) DEVICE — SOL IRR NACL .9% 3000ML

## (undated) DEVICE — NDL 22GA X1 1/2 REG BEVEL

## (undated) DEVICE — STAPLER SKIN PROXIMATE WIDE

## (undated) DEVICE — NDL INSUF ULTRA VERESS 120MM

## (undated) DEVICE — KIT DRESSING PREVENA PLUS

## (undated) DEVICE — SUT 0 VICRYL / UR6 (J603)

## (undated) DEVICE — STAPLER SKIN ROTATING HEAD

## (undated) DEVICE — TRAY FOLEY 16FR INFECTION CONT

## (undated) DEVICE — GLOVE BIOGEL ECLIPSE SZ 7.5

## (undated) DEVICE — SEE MEDLINE ITEM 146417

## (undated) DEVICE — DRESSING XEROFORM STRL 2X2

## (undated) DEVICE — TROCAR ENDOPATH XCEL 5X75MM

## (undated) DEVICE — DRESSING TRANS 4X4 TEGADERM

## (undated) DEVICE — DRAPE ABDOMINAL TIBURON 14X11

## (undated) DEVICE — SUT 1 48IN PDS II VIO MONO

## (undated) DEVICE — MANIFOLD 4 PORT

## (undated) DEVICE — STRIP WOUND PK BIGUANIDE 36X.5

## (undated) DEVICE — SEE MEDLINE ITEM 156952

## (undated) DEVICE — GAUZE SPONGE 4X4 12PLY

## (undated) DEVICE — TAPE ADH MEDIPORE 4 X 10YDS

## (undated) DEVICE — SEE MEDLINE ITEM 156902

## (undated) DEVICE — ELECTRODE REM PLYHSV RETURN 9

## (undated) DEVICE — SUT MONOCRYL 4-0 PS-2

## (undated) DEVICE — CANISTER INFOV.A.C WOUND 500ML

## (undated) DEVICE — DRESSING TEGADERM 2 3/8 X 2.75

## (undated) DEVICE — EVACUATOR WOUND BULB 100CC

## (undated) DEVICE — DRAIN CHANNEL ROUND 19FR

## (undated) DEVICE — SEALER LIGASURE LAP 37CM 5MM

## (undated) DEVICE — SPONGE DERMA 8PLY 2X2

## (undated) DEVICE — SCISSOR 5MMX35CM DIRECT DRIVE

## (undated) DEVICE — IRRIGATOR ENDOSCOPY DISP.

## (undated) DEVICE — TROCAR ENDOPATH XCEL 12X100MM

## (undated) DEVICE — DRAPE INCISE IOBAN 2 23X17IN

## (undated) DEVICE — PAD ABD 8X10 STERILE

## (undated) DEVICE — SEE MEDLINE ITEM 146372

## (undated) DEVICE — SUT VICRYL 3-0 27 SH

## (undated) DEVICE — SWAB CULTURETTE II DUAL

## (undated) DEVICE — BLADE 4 INCH EDGE UN-INS

## (undated) DEVICE — SUT CTD VICRYL CT-1 27

## (undated) DEVICE — CART STAPLE RELD 45MM WHT

## (undated) DEVICE — SUPPORT ULNA NERVE PROTECTOR

## (undated) DEVICE — TROCAR ENDOPATH XCEL 5MM 7.5CM

## (undated) DEVICE — SWAB CULTURETTE SINGLE

## (undated) DEVICE — SEE L#120831